# Patient Record
Sex: MALE | Race: WHITE | Employment: OTHER | URBAN - METROPOLITAN AREA
[De-identification: names, ages, dates, MRNs, and addresses within clinical notes are randomized per-mention and may not be internally consistent; named-entity substitution may affect disease eponyms.]

---

## 2018-04-26 LAB
CHOLESTEROL, TOTAL: 277 MG/DL
CHOLESTEROL/HDL RATIO: ABNORMAL
HDLC SERPL-MCNC: 85 MG/DL (ref 35–70)
LDL CHOLESTEROL CALCULATED: 173 MG/DL (ref 0–160)
TRIGL SERPL-MCNC: 96 MG/DL
VLDLC SERPL CALC-MCNC: 19 MG/DL

## 2018-10-30 ENCOUNTER — HOSPITAL ENCOUNTER (OUTPATIENT)
Age: 73
Discharge: HOME | End: 2018-10-30
Payer: COMMERCIAL

## 2018-10-30 ENCOUNTER — HOSPITAL ENCOUNTER (OUTPATIENT)
Dept: GENERAL RADIOLOGY | Age: 73
Discharge: HOME | End: 2018-10-30
Payer: COMMERCIAL

## 2018-10-30 DIAGNOSIS — M54.42 LOW BACK PAIN WITH LEFT-SIDED SCIATICA, UNSPECIFIED BACK PAIN LATERALITY, UNSPECIFIED CHRONICITY: ICD-10-CM

## 2018-10-30 PROCEDURE — 72100 X-RAY EXAM L-S SPINE 2/3 VWS: CPT

## 2018-12-27 ENCOUNTER — HOSPITAL ENCOUNTER (OUTPATIENT)
Dept: PHYSICAL THERAPY | Age: 73
Setting detail: THERAPIES SERIES
Discharge: HOME | End: 2018-12-27
Payer: COMMERCIAL

## 2018-12-27 PROCEDURE — 97162 PT EVAL MOD COMPLEX 30 MIN: CPT

## 2018-12-27 PROCEDURE — G8979 MOBILITY GOAL STATUS: HCPCS

## 2018-12-27 PROCEDURE — 97110 THERAPEUTIC EXERCISES: CPT

## 2018-12-27 PROCEDURE — 97140 MANUAL THERAPY 1/> REGIONS: CPT

## 2018-12-27 PROCEDURE — G8978 MOBILITY CURRENT STATUS: HCPCS

## 2018-12-27 ASSESSMENT — PAIN DESCRIPTION - ORIENTATION: ORIENTATION: LEFT

## 2018-12-27 ASSESSMENT — PAIN DESCRIPTION - LOCATION: LOCATION: HIP;LEG

## 2018-12-27 ASSESSMENT — PAIN SCALES - GENERAL: PAINLEVEL_OUTOF10: 4

## 2018-12-27 ASSESSMENT — PAIN DESCRIPTION - FREQUENCY: FREQUENCY: CONTINUOUS

## 2018-12-27 ASSESSMENT — PAIN DESCRIPTION - PROGRESSION: CLINICAL_PROGRESSION: NOT CHANGED

## 2018-12-27 ASSESSMENT — PAIN DESCRIPTION - DESCRIPTORS: DESCRIPTORS: SHARP

## 2018-12-27 ASSESSMENT — PAIN DESCRIPTION - ONSET: ONSET: SUDDEN

## 2018-12-27 ASSESSMENT — PAIN DESCRIPTION - DIRECTION: RADIATING_TOWARDS: DOWN TO KNEE

## 2018-12-27 ASSESSMENT — PAIN DESCRIPTION - PAIN TYPE: TYPE: CHRONIC PAIN

## 2018-12-27 NOTE — PLAN OF CARE
Outpatient Physical Therapy           [place]           [x] Phone: [phone]   Fax: [phone]  [place]           [] Phone: [phone]   Fax: [phone]     To: Referring Practitioner: Bhavana Perez    From: Gustavo Hector, KEY     Patient: Melissa Muller       : 1945  Diagnosis: Diagnosis: LBP w/ L LE pain   Treatment Diagnosis: Treatment Diagnosis: L hip and LE pain, ITB tightness   Date: 2018    Physical Therapy Certification/Re-Certification Form  Dear Dr. Barbara Simon,   The following patient has been evaluated for physical therapy services and for therapy to continue, insurance requires physician review of the treatment plan initially and every 90 days. Please review the attached evaluation and/or summary of the patient's plan of care, and verify that you agree therapy should continue by signing the attached document and sending it back to our office. Assessment:    Assessment: Pt is a 73 yo male who presents w/ Dx of LBP and L LE pain. He demonstrates symptoms consistent w/ L Hip issue including decreased ROM, TTP at trochanter, decreased strength and soft tissue tightness in anterior and lateral hip. His gait is quite antalgic. LB testing is negative for radicular type pain at this time. These limitations are affecting his normal activity and he will benefit from PT to help restore ROM and gait w/o pain. Prior to [address] [place] [address] in April, he had no pain in L hip/leg. Patient agrees with established plan of care and assisted in the development of their short term and long term goals. Patient had no adverse reaction with initial treatment and there are no barriers to learning. Demonstrates no mental or cognitive disorder.       Plan of Care/Treatment to date:  [x] Therapeutic Exercise  [x] Modalities:  [x] Therapeutic Activity     [] Ultrasound  [x] Electrical Stimulation  [] Gait Training      [] Cervical Traction [] Lumbar Traction  [x] Neuromuscular Re-education    [x] Cold/hotpack [] Iontophoresis   [x] Instruction in HEP      [] Vasopneumatic     [x] Manual Therapy               [] Aquatic Therapy       Other:    ? Frequency/Duration:2x/week to start, may decrease to 1x/week after first 2 weeks d/t copay  # Days per week: [x] 1 day # Weeks: [] 1 week [] 5 weeks     [x] 2 days?    [] 2 weeks [x] 6 weeks     [] 3 days   [] 3 weeks [] 7 weeks     [] 4 days   [] 4 weeks [] 8 weeks         [] 9 weeks [] 10 weeks         [] 11 weeks [] 12 weeks    Rehab Potential/Progress: [] Excellent [x] Good [] Fair  [] Poor     Goals:      Short term goals  Time Frame for Short term goals: 3 weeks, 1/18/19  Short term goal 1: Pt will be able to report at least 25% reduction in pain  Short term goal 2: Pt will be able to walk w/ less reported pain at least 5-10 mins at a time  Long term goals  Time Frame for Long term goals : 6 weeks, 2/8/19  Long term goal 1: Pt will be independent w/ HEP and able to continue to manage his pain on his own after PT  Long term goal 2: Pt will be able go up/down stairs normally w/o pain  Long term goal 3: Pt will be able to walk w/o limp or pain for > 10 mins  Long term goal 4: Pt will be able to report max pain w/ ADLs 5/10    G-Code Selection: (On Eval and every 10th visit or Discharge)  MEASURE  [x] Mobility: Walking and Moving Around     [x] Current ()   [x] Goal ()   [] DC ()  [] Changing/Maintaining Body Position     [] Current (8981)      [] Goal ()   [] DC ()  [] Carrying / Moving / Handling Objects     [] Current ()   [] Goal ()   [] DC ()  [] Self-Care     [] Current ()   [] Goal ()   [] DC ()  [] Other PT/OT primary DX     [] Current ()   [] Goal ()   [] DC ()    SEVERITY  CURRENT  GOAL  DISCHARGE   [] CH (0% Impaired, Indep.)  [] CI (1-19% Impaired, SBA-CGA)  [] CJ (20-39% Impaired, MIN A)  [x] CK  (40-59% Impairment, Mod A)  [] CL  (60-79% Impairment, Max A)  [] CM  (80-99% Impairment, Dep.)   []

## 2018-12-28 NOTE — PROGRESS NOTES
mins  Long term goal 4: Pt will be able to report max pain w/ ADLs 5/10  Patient Goals   Patient goals : reducing pain, walking w/o pain, stairs normally too and bending         Padmini Blake, PT  PT, MPT, ATC     12/27/2018, 7:01 PM

## 2019-01-03 ENCOUNTER — HOSPITAL ENCOUNTER (OUTPATIENT)
Dept: PHYSICAL THERAPY | Age: 74
Discharge: HOME | End: 2019-01-03

## 2019-01-03 NOTE — FLOWSHEET NOTE
Physical Therapy  Cancellation/No-show Note  Patient Name:  Cecille Stanley  :  1945   Date:  1/3/2019  Cancelled visits to date: 1  No-shows to date: 0    For today's appointment patient:  [x]  Cancelled  []  Rescheduled appointment  []  No-show     Reason given by patient:  []  Patient ill  []  Conflicting appointment  []  No transportation    []  Conflict with work  [x]  No reason given  []  Other:     Comments:      Electronically signed by:  Brianne Serrano PTA     1/3/2019,3:26 PM

## 2019-01-10 ENCOUNTER — HOSPITAL ENCOUNTER (OUTPATIENT)
Dept: PHYSICAL THERAPY | Age: 74
Setting detail: THERAPIES SERIES
Discharge: HOME | End: 2019-01-10
Payer: COMMERCIAL

## 2019-01-10 PROCEDURE — 97140 MANUAL THERAPY 1/> REGIONS: CPT

## 2019-01-10 PROCEDURE — 97530 THERAPEUTIC ACTIVITIES: CPT

## 2019-01-22 ENCOUNTER — HOSPITAL ENCOUNTER (OUTPATIENT)
Dept: PHYSICAL THERAPY | Age: 74
Setting detail: THERAPIES SERIES
Discharge: HOME | End: 2019-01-22
Payer: COMMERCIAL

## 2019-01-22 PROCEDURE — 97140 MANUAL THERAPY 1/> REGIONS: CPT

## 2019-01-22 PROCEDURE — 97110 THERAPEUTIC EXERCISES: CPT

## 2019-01-22 PROCEDURE — 97112 NEUROMUSCULAR REEDUCATION: CPT

## 2019-04-05 ENCOUNTER — HOSPITAL ENCOUNTER (OUTPATIENT)
Dept: PHYSICAL THERAPY | Age: 74
Discharge: HOME | End: 2019-04-05

## 2019-04-05 NOTE — PROGRESS NOTES
To: Referring Practitioner: Samir Murphy                                          From: Dharmesh Pedroza, PT             Patient: Estela Archer                                                      : 1945  Diagnosis: Diagnosis: LBP w/ L LE pain         Treatment Diagnosis: Treatment Diagnosis: L hip and LE pain, ITB tightness     2019       Dr Samir Murphy,     Pt only came 3x and no showed last 4 visits in a row. He had minimal progress but we are not able to fully assess d/t not returning. It has now been over 30 days so we will formally d/c and can re-evaluate at a later time prn.       Thank you,   Dharmesh Pedroza PT, MPT, ATC     2019, 9:15 AM

## 2019-05-26 ENCOUNTER — APPOINTMENT (OUTPATIENT)
Dept: ULTRASOUND IMAGING | Age: 74
DRG: 433 | End: 2019-05-26
Payer: MEDICARE

## 2019-05-26 ENCOUNTER — APPOINTMENT (OUTPATIENT)
Dept: CT IMAGING | Age: 74
DRG: 433 | End: 2019-05-26
Payer: MEDICARE

## 2019-05-26 ENCOUNTER — HOSPITAL ENCOUNTER (INPATIENT)
Age: 74
LOS: 2 days | Discharge: HOME | DRG: 433 | End: 2019-05-28
Attending: EMERGENCY MEDICINE | Admitting: INTERNAL MEDICINE
Payer: MEDICARE

## 2019-05-26 ENCOUNTER — APPOINTMENT (OUTPATIENT)
Dept: GENERAL RADIOLOGY | Age: 74
DRG: 433 | End: 2019-05-26
Payer: MEDICARE

## 2019-05-26 DIAGNOSIS — M79.89 LEG SWELLING: ICD-10-CM

## 2019-05-26 DIAGNOSIS — R06.00 DYSPNEA, UNSPECIFIED TYPE: Primary | ICD-10-CM

## 2019-05-26 DIAGNOSIS — R18.8 OTHER ASCITES: ICD-10-CM

## 2019-05-26 DIAGNOSIS — F10.20 ALCOHOLISM (HCC): ICD-10-CM

## 2019-05-26 PROBLEM — R06.02 SHORTNESS OF BREATH: Status: ACTIVE | Noted: 2019-05-26

## 2019-05-26 LAB
ALBUMIN SERPL-MCNC: 3.9 GM/DL (ref 3.4–5)
ALP BLD-CCNC: 154 IU/L (ref 40–128)
ALT SERPL-CCNC: 31 U/L (ref 10–40)
AMMONIA: 39 UMOL/L (ref 16–60)
ANION GAP SERPL CALCULATED.3IONS-SCNC: 13 MMOL/L (ref 4–16)
AST SERPL-CCNC: 59 IU/L (ref 15–37)
BASOPHILS ABSOLUTE: 0.1 K/CU MM
BASOPHILS RELATIVE PERCENT: 0.9 % (ref 0–1)
BILIRUB SERPL-MCNC: 0.7 MG/DL (ref 0–1)
BUN BLDV-MCNC: 13 MG/DL (ref 6–23)
CALCIUM SERPL-MCNC: 9.5 MG/DL (ref 8.3–10.6)
CHLORIDE BLD-SCNC: 101 MMOL/L (ref 99–110)
CO2: 25 MMOL/L (ref 21–32)
CREAT SERPL-MCNC: 0.8 MG/DL (ref 0.9–1.3)
DIFFERENTIAL TYPE: ABNORMAL
EOSINOPHILS ABSOLUTE: 0.5 K/CU MM
EOSINOPHILS RELATIVE PERCENT: 7.3 % (ref 0–3)
GFR AFRICAN AMERICAN: >60 ML/MIN/1.73M2
GFR NON-AFRICAN AMERICAN: >60 ML/MIN/1.73M2
GLUCOSE BLD-MCNC: 134 MG/DL (ref 70–99)
HCT VFR BLD CALC: 38.8 % (ref 42–52)
HEMOGLOBIN: 12.6 GM/DL (ref 13.5–18)
IMMATURE NEUTROPHIL %: 0.5 % (ref 0–0.43)
INR BLD: 1.09 INDEX
INR BLD: 1.11 INDEX
LYMPHOCYTES ABSOLUTE: 1.5 K/CU MM
LYMPHOCYTES RELATIVE PERCENT: 22.4 % (ref 24–44)
MCH RBC QN AUTO: 32.6 PG (ref 27–31)
MCHC RBC AUTO-ENTMCNC: 32.5 % (ref 32–36)
MCV RBC AUTO: 100.5 FL (ref 78–100)
MONOCYTES ABSOLUTE: 0.8 K/CU MM
MONOCYTES RELATIVE PERCENT: 11.4 % (ref 0–4)
NUCLEATED RBC %: 0 %
PDW BLD-RTO: 14.2 % (ref 11.7–14.9)
PLATELET # BLD: 150 K/CU MM (ref 140–440)
PMV BLD AUTO: 10.2 FL (ref 7.5–11.1)
POTASSIUM SERPL-SCNC: 3.7 MMOL/L (ref 3.5–5.1)
PRO-BNP: 186 PG/ML
PROTHROMBIN TIME: 12.4 SECONDS (ref 9.12–12.5)
PROTHROMBIN TIME: 12.6 SECONDS (ref 9.12–12.5)
RBC # BLD: 3.86 M/CU MM (ref 4.6–6.2)
SEGMENTED NEUTROPHILS ABSOLUTE COUNT: 3.8 K/CU MM
SEGMENTED NEUTROPHILS RELATIVE PERCENT: 57.5 % (ref 36–66)
SODIUM BLD-SCNC: 139 MMOL/L (ref 135–145)
TOTAL IMMATURE NEUTOROPHIL: 0.03 K/CU MM
TOTAL NUCLEATED RBC: 0 K/CU MM
TOTAL PROTEIN: 7.7 GM/DL (ref 6.4–8.2)
TROPONIN T: <0.01 NG/ML
WBC # BLD: 6.6 K/CU MM (ref 4–10.5)

## 2019-05-26 PROCEDURE — 94761 N-INVAS EAR/PLS OXIMETRY MLT: CPT

## 2019-05-26 PROCEDURE — 84484 ASSAY OF TROPONIN QUANT: CPT

## 2019-05-26 PROCEDURE — 2580000003 HC RX 258: Performed by: INTERNAL MEDICINE

## 2019-05-26 PROCEDURE — 6370000000 HC RX 637 (ALT 250 FOR IP): Performed by: INTERNAL MEDICINE

## 2019-05-26 PROCEDURE — 96374 THER/PROPH/DIAG INJ IV PUSH: CPT

## 2019-05-26 PROCEDURE — 6360000002 HC RX W HCPCS: Performed by: INTERNAL MEDICINE

## 2019-05-26 PROCEDURE — 2060000000 HC ICU INTERMEDIATE R&B

## 2019-05-26 PROCEDURE — 93010 ELECTROCARDIOGRAM REPORT: CPT | Performed by: INTERNAL MEDICINE

## 2019-05-26 PROCEDURE — 71275 CT ANGIOGRAPHY CHEST: CPT

## 2019-05-26 PROCEDURE — 93005 ELECTROCARDIOGRAM TRACING: CPT | Performed by: EMERGENCY MEDICINE

## 2019-05-26 PROCEDURE — 94640 AIRWAY INHALATION TREATMENT: CPT

## 2019-05-26 PROCEDURE — 71045 X-RAY EXAM CHEST 1 VIEW: CPT

## 2019-05-26 PROCEDURE — 85025 COMPLETE CBC W/AUTO DIFF WBC: CPT

## 2019-05-26 PROCEDURE — 6370000000 HC RX 637 (ALT 250 FOR IP): Performed by: PHYSICIAN ASSISTANT

## 2019-05-26 PROCEDURE — 93970 EXTREMITY STUDY: CPT

## 2019-05-26 PROCEDURE — 6360000002 HC RX W HCPCS: Performed by: PHYSICIAN ASSISTANT

## 2019-05-26 PROCEDURE — 6360000004 HC RX CONTRAST MEDICATION: Performed by: PHYSICIAN ASSISTANT

## 2019-05-26 PROCEDURE — 80053 COMPREHEN METABOLIC PANEL: CPT

## 2019-05-26 PROCEDURE — 36415 COLL VENOUS BLD VENIPUNCTURE: CPT

## 2019-05-26 PROCEDURE — 94664 DEMO&/EVAL PT USE INHALER: CPT

## 2019-05-26 PROCEDURE — 83880 ASSAY OF NATRIURETIC PEPTIDE: CPT

## 2019-05-26 PROCEDURE — 85610 PROTHROMBIN TIME: CPT

## 2019-05-26 PROCEDURE — 99285 EMERGENCY DEPT VISIT HI MDM: CPT

## 2019-05-26 PROCEDURE — 82140 ASSAY OF AMMONIA: CPT

## 2019-05-26 RX ORDER — LORAZEPAM 1 MG/1
4 TABLET ORAL
Status: DISCONTINUED | OUTPATIENT
Start: 2019-05-26 | End: 2019-05-28 | Stop reason: HOSPADM

## 2019-05-26 RX ORDER — IPRATROPIUM BROMIDE AND ALBUTEROL SULFATE 2.5; .5 MG/3ML; MG/3ML
1 SOLUTION RESPIRATORY (INHALATION) EVERY 4 HOURS PRN
Status: DISCONTINUED | OUTPATIENT
Start: 2019-05-26 | End: 2019-05-28 | Stop reason: HOSPADM

## 2019-05-26 RX ORDER — SPIRONOLACTONE 50 MG/1
50 TABLET, FILM COATED ORAL DAILY
Status: DISCONTINUED | OUTPATIENT
Start: 2019-05-26 | End: 2019-05-28 | Stop reason: HOSPADM

## 2019-05-26 RX ORDER — LORAZEPAM 1 MG/1
3 TABLET ORAL
Status: DISCONTINUED | OUTPATIENT
Start: 2019-05-26 | End: 2019-05-28 | Stop reason: HOSPADM

## 2019-05-26 RX ORDER — LORAZEPAM 2 MG/ML
3 INJECTION INTRAMUSCULAR
Status: DISCONTINUED | OUTPATIENT
Start: 2019-05-26 | End: 2019-05-28 | Stop reason: HOSPADM

## 2019-05-26 RX ORDER — ALPRAZOLAM 0.25 MG/1
0.25 TABLET ORAL 2 TIMES DAILY PRN
Status: DISCONTINUED | OUTPATIENT
Start: 2019-05-26 | End: 2019-05-28 | Stop reason: HOSPADM

## 2019-05-26 RX ORDER — METHYLPREDNISOLONE SODIUM SUCCINATE 125 MG/2ML
125 INJECTION, POWDER, LYOPHILIZED, FOR SOLUTION INTRAMUSCULAR; INTRAVENOUS ONCE
Status: COMPLETED | OUTPATIENT
Start: 2019-05-26 | End: 2019-05-26

## 2019-05-26 RX ORDER — LORAZEPAM 1 MG/1
2 TABLET ORAL
Status: DISCONTINUED | OUTPATIENT
Start: 2019-05-26 | End: 2019-05-28 | Stop reason: HOSPADM

## 2019-05-26 RX ORDER — ALPRAZOLAM 0.25 MG/1
0.25 TABLET ORAL DAILY PRN
COMMUNITY
End: 2020-06-22 | Stop reason: SDUPTHER

## 2019-05-26 RX ORDER — LORAZEPAM 2 MG/ML
1 INJECTION INTRAMUSCULAR
Status: DISCONTINUED | OUTPATIENT
Start: 2019-05-26 | End: 2019-05-28 | Stop reason: HOSPADM

## 2019-05-26 RX ORDER — LORAZEPAM 2 MG/ML
4 INJECTION INTRAMUSCULAR
Status: DISCONTINUED | OUTPATIENT
Start: 2019-05-26 | End: 2019-05-28 | Stop reason: HOSPADM

## 2019-05-26 RX ORDER — PANTOPRAZOLE SODIUM 40 MG/1
40 TABLET, DELAYED RELEASE ORAL
Status: DISCONTINUED | OUTPATIENT
Start: 2019-05-27 | End: 2019-05-28 | Stop reason: HOSPADM

## 2019-05-26 RX ORDER — ONDANSETRON 2 MG/ML
4 INJECTION INTRAMUSCULAR; INTRAVENOUS EVERY 6 HOURS PRN
Status: DISCONTINUED | OUTPATIENT
Start: 2019-05-26 | End: 2019-05-28 | Stop reason: HOSPADM

## 2019-05-26 RX ORDER — DULOXETIN HYDROCHLORIDE 30 MG/1
60 CAPSULE, DELAYED RELEASE ORAL DAILY
Status: DISCONTINUED | OUTPATIENT
Start: 2019-05-27 | End: 2019-05-28 | Stop reason: HOSPADM

## 2019-05-26 RX ORDER — SODIUM CHLORIDE 0.9 % (FLUSH) 0.9 %
10 SYRINGE (ML) INJECTION PRN
Status: DISCONTINUED | OUTPATIENT
Start: 2019-05-26 | End: 2019-05-28 | Stop reason: HOSPADM

## 2019-05-26 RX ORDER — METHYLPREDNISOLONE SODIUM SUCCINATE 40 MG/ML
40 INJECTION, POWDER, LYOPHILIZED, FOR SOLUTION INTRAMUSCULAR; INTRAVENOUS EVERY 6 HOURS
Status: DISCONTINUED | OUTPATIENT
Start: 2019-05-26 | End: 2019-05-28 | Stop reason: HOSPADM

## 2019-05-26 RX ORDER — NADOLOL 20 MG/1
40 TABLET ORAL DAILY
Status: DISCONTINUED | OUTPATIENT
Start: 2019-05-26 | End: 2019-05-28 | Stop reason: HOSPADM

## 2019-05-26 RX ORDER — LORAZEPAM 2 MG/ML
2 INJECTION INTRAMUSCULAR
Status: DISCONTINUED | OUTPATIENT
Start: 2019-05-26 | End: 2019-05-28 | Stop reason: HOSPADM

## 2019-05-26 RX ORDER — CHLORDIAZEPOXIDE HYDROCHLORIDE 5 MG/1
10 CAPSULE, GELATIN COATED ORAL 2 TIMES DAILY
Status: DISCONTINUED | OUTPATIENT
Start: 2019-05-26 | End: 2019-05-28 | Stop reason: HOSPADM

## 2019-05-26 RX ORDER — DULOXETIN HYDROCHLORIDE 60 MG/1
60 CAPSULE, DELAYED RELEASE ORAL DAILY
COMMUNITY
End: 2019-11-17 | Stop reason: SDUPTHER

## 2019-05-26 RX ORDER — IPRATROPIUM BROMIDE AND ALBUTEROL SULFATE 2.5; .5 MG/3ML; MG/3ML
1 SOLUTION RESPIRATORY (INHALATION) 4 TIMES DAILY
Status: DISCONTINUED | OUTPATIENT
Start: 2019-05-26 | End: 2019-05-28 | Stop reason: HOSPADM

## 2019-05-26 RX ORDER — FUROSEMIDE 10 MG/ML
40 INJECTION INTRAMUSCULAR; INTRAVENOUS 2 TIMES DAILY
Status: DISCONTINUED | OUTPATIENT
Start: 2019-05-27 | End: 2019-05-28 | Stop reason: HOSPADM

## 2019-05-26 RX ORDER — AMLODIPINE BESYLATE 5 MG/1
5 TABLET ORAL DAILY
Status: ON HOLD | COMMUNITY
End: 2019-05-28 | Stop reason: HOSPADM

## 2019-05-26 RX ORDER — PROCHLORPERAZINE EDISYLATE 5 MG/ML
10 INJECTION INTRAMUSCULAR; INTRAVENOUS EVERY 6 HOURS PRN
Status: DISCONTINUED | OUTPATIENT
Start: 2019-05-26 | End: 2019-05-28 | Stop reason: HOSPADM

## 2019-05-26 RX ORDER — IPRATROPIUM BROMIDE AND ALBUTEROL SULFATE 2.5; .5 MG/3ML; MG/3ML
1 SOLUTION RESPIRATORY (INHALATION) ONCE
Status: COMPLETED | OUTPATIENT
Start: 2019-05-26 | End: 2019-05-26

## 2019-05-26 RX ORDER — LORAZEPAM 1 MG/1
1 TABLET ORAL
Status: DISCONTINUED | OUTPATIENT
Start: 2019-05-26 | End: 2019-05-28 | Stop reason: HOSPADM

## 2019-05-26 RX ADMIN — METHYLPREDNISOLONE SODIUM SUCCINATE 125 MG: 125 INJECTION, POWDER, LYOPHILIZED, FOR SOLUTION INTRAMUSCULAR; INTRAVENOUS at 15:48

## 2019-05-26 RX ADMIN — CHLORDIAZEPOXIDE HYDROCHLORIDE 10 MG: 5 CAPSULE ORAL at 22:48

## 2019-05-26 RX ADMIN — IOPAMIDOL 75 ML: 755 INJECTION, SOLUTION INTRAVENOUS at 17:45

## 2019-05-26 RX ADMIN — SPIRONOLACTONE 50 MG: 50 TABLET ORAL at 22:47

## 2019-05-26 RX ADMIN — GUAIFENESIN AND DEXTROMETHORPHAN HYDROBROMIDE 1 TABLET: 600; 30 TABLET, EXTENDED RELEASE ORAL at 22:48

## 2019-05-26 RX ADMIN — IPRATROPIUM BROMIDE AND ALBUTEROL SULFATE 1 AMPULE: .5; 3 SOLUTION RESPIRATORY (INHALATION) at 15:10

## 2019-05-26 RX ADMIN — METHYLPREDNISOLONE SODIUM SUCCINATE 40 MG: 40 INJECTION, POWDER, LYOPHILIZED, FOR SOLUTION INTRAMUSCULAR; INTRAVENOUS at 22:44

## 2019-05-26 RX ADMIN — IPRATROPIUM BROMIDE AND ALBUTEROL SULFATE 1 AMPULE: .5; 3 SOLUTION RESPIRATORY (INHALATION) at 22:50

## 2019-05-26 RX ADMIN — AZITHROMYCIN MONOHYDRATE 500 MG: 500 INJECTION, POWDER, LYOPHILIZED, FOR SOLUTION INTRAVENOUS at 22:47

## 2019-05-26 RX ADMIN — ALPRAZOLAM 0.25 MG: 0.25 TABLET ORAL at 22:48

## 2019-05-26 RX ADMIN — NADOLOL 40 MG: 20 TABLET ORAL at 22:48

## 2019-05-26 ASSESSMENT — PAIN SCALES - GENERAL: PAINLEVEL_OUTOF10: 0

## 2019-05-26 NOTE — H&P
HISTORY AND PHYSICAL  (Hospitalist, Internal Medicine)  IDENTIFYING INFORMATION   PATIENT:  Leah Chaparro  MRN:  [id number]  ADMIT DATE: 5/26/2019  TIME OF EVALUATION: 5/26/2019 7:12 PM    CHIEF COMPLAINT   SOB    HISTORY OF PRESENT ILLNESS   Leah Chaparro is a 72 y.o. male with a past medical history 1/3 liter of whiskey consumption daily, HTN, hx of smoking but has now quit smoking many years previously presents with 3-4 weeks hx of progressive SOB, worse on exertion. Associated with several months of worsening b/l LE swelling, edema that has not improved with time. In additional, today, his symptoms acutely worsened as he felt he had the cold with increase mucus production. These symptoms have not been improving leading to hospital admission for evaluation of multiple issues.       PMH listed below:    PAST MEDICAL, SURGICAL, FAMILY, and SOCIAL HISTORY     Past Medical History:   Diagnosis Date    Hypertension      Past Surgical History:   Procedure Laterality Date    CHOLECYSTECTOMY       Family Hx of HTN  Family Hx as reviewed above, otherwise non-contributory  Social History     Socioeconomic History    Marital status:      Spouse name: None    Number of children: None    Years of education: None    Highest education level: None   Occupational History    None   Social Needs    Financial resource strain: None    Food insecurity:     Worry: None     Inability: None    Transportation needs:     Medical: None     Non-medical: None   Tobacco Use    Smoking status: Never Smoker    Smokeless tobacco: Never Used   Substance and Sexual Activity    Alcohol use: Yes     Comment: drink 1/3 liter of whiskey everyday    Drug use: Never    Sexual activity: None   Lifestyle    Physical activity:     Days per week: None     Minutes per session: None    Stress: None   Relationships    Social connections:     Talks on phone: None     Gets together: None     Attends Sikh service: None     Active member of club or organization: None     Attends meetings of clubs or organizations: None     Relationship status: None    Intimate partner violence:     Fear of current or ex partner: None     Emotionally abused: None     Physically abused: None     Forced sexual activity: None   Other Topics Concern    None   Social History Narrative    None       MEDICATIONS   Medications Prior to Admission  Not in a hospital admission. Current Medications  Current Facility-Administered Medications   Medication Dose Route Frequency Provider Last Rate Last Dose    sodium chloride flush 0.9 % injection 10 mL  10 mL Intravenous PRN KATIE Vargas         No current outpatient medications on file. Allergies  Allergies   Allergen Reactions    Codeine Other (See Comments)     Blood pressure drops       REVIEW OF SYSTEMS   Within above limitations. 14 point review of systems reviewed. Pertinent positive or negative as per HPI or otherwise negative per 14 point systems review. PHYSICAL EXAM     Wt Readings from Last 3 Encounters:   05/26/19 280 lb (127 kg)       Blood pressure (!) 154/75, pulse 99, resp. rate 23, height 5' 8\" (1.727 m), weight 280 lb (127 kg), SpO2 94 %. General - AAO x 3  Psych - Appropriate affect/speech. No agitation  Eyes - Eye lids intact. No scleral icterus  ENT - Lips wnl. External ear clear/dry/intact. No thyromegaly on inspection  Neuro - No gross peripheral or central neuro deficits on inspection  Heart - Sinus. RRR. S1 and S2 present. No added HS/murmurs appreciated. No elevated JVD appreciated  Lung - Adequate air entry b/l, No crackles/wheezes appreciated  GI - Soft. No guarding/rigidity. No hepatosplenomegaly/ascites. BS+   - No CVA/suprapubic tenderness or palpable bladder distension  Skin - Intact. No rash/petechiae/ecchymosis. Warm extremities  MSK - Joints with normal ROM.  No joint swellings    Lines/Drains/Airways/Wounds:  [unfilled]    LABS AND IMAGING CBC  [unfilled]    Last 3 Hemoglobin  Lab Results   Component Value Date    HGB 12.6 05/26/2019     Last 3 WBC/ANC  Lab Results   Component Value Date    WBC 6.6 05/26/2019     No components found for: GRNLOCTYABS  Last 3 Platelets  No results found for: PLATELET  Chemistry  [unfilled]  [unfilled]  No results found for: LDH  Coagulation Studies  Lab Results   Component Value Date    INR 1.09 05/26/2019     Liver Function Studies  Lab Results   Component Value Date    ALT 31 05/26/2019    AST 59 05/26/2019    ALKPHOS 154 05/26/2019       Recent Imaging    Dave Sullivan #[id number] ([id number]) (72 y.o. M) (Adm: 05/26/19)    [facility] [id number]         Imaging Results (last 7 days)          Procedure Component Value Ref Range Date/Time     VL DUP LOWER EXTREMITY VENOUS BILATERAL [[id number]] Collected: 05/26/19 1819     Order Status: Completed Updated: 05/26/19 1823     Narrative:       EXAMINATION:  DUPLEX VENOUS ULTRASOUND OF THE BILATERAL LOWER EXTREMITIES, 5/26/2019 5:42 pm    TECHNIQUE:  Duplex ultrasound and Doppler images were obtained of the bilateral lower  extremities    COMPARISON:  None. HISTORY:  ORDERING SYSTEM PROVIDED HISTORY: leg swelling  TECHNOLOGIST PROVIDED HISTORY:  Reason for exam:->leg swelling  Acuity: Acute  Type of Exam: Initial    FINDINGS:  The visualized veins of the bilateral lower extremities are patent and free  of echogenic thrombus. The veins are normally compressible and have normal  phasic flow.     Impression:       No evidence of DVT in either lower extremity.     CTA PULMONARY W CONTRAST [[id number]] Collected: 05/26/19 1748     Order Status: Completed Updated: 05/26/19 1800     Narrative:       EXAMINATION:  CTA OF THE CHEST 5/26/2019 5:44 pm    TECHNIQUE:  CTA of the chest was performed after the administration of intravenous  contrast.  Multiplanar reformatted images are provided for review.  MIP  images are provided for review.  Dose modulation, iterative from 2015.     Impression:       1. No evidence pulmonary embolism. 2. Small right pleural effusion with associated atelectasis.  Mild left  basilar atelectasis. 3. Cirrhotic hepatic morphology with a moderate to large volume of  intra-ascites. 4. Nonspecific mediastinal lymphadenopathy and enlarged paraesophageal nodes. Prominent axillary nodes also noted bilaterally.  Similar lymphadenopathy  noted at the distal esophagus and partially imaged mediastinum on prior CT  abdomen and pelvis from 2015.  Correlate for any history of known malignancy.     XR CHEST PORTABLE [[id number]] Collected: 05/26/19 1503     Order Status: Completed Updated: 05/26/19 0968     Narrative:       EXAMINATION:  ONE XRAY VIEW OF THE CHEST    5/26/2019 2:31 pm    COMPARISON:  July 7, 2014    HISTORY:  ORDERING SYSTEM PROVIDED HISTORY: SOB  TECHNOLOGIST PROVIDED HISTORY:  Reason for exam:->SOB  Ordering Physician Provided Reason for Exam: SOB  Acuity: Acute  Type of Exam: Initial  Additional signs and symptoms: Leg Swelling (bilateral leg and feet swelling  for 1 month)  Relevant Medical/Surgical History: htn    FINDINGS:  The cardiomediastinal silhouette is moderately enlarged. Mild vascular congestion is present without evidence of focal consolidation  or pulmonary edema. No pleural effusion or pneumothorax.     Impression:       Mild cardiomegaly and vascular congestive changes.         EKG personally reviewed with rate 92, NSR      Relevant labs and imaging reviewed    ASSESSMENT AND PLAN     SOB suspect 2/2   1) Acute bronchitis, hx of smoking  2) Abdominal ascites , abdo distension 2/2 acute decompensated liver cirrhosis  3) CXR mild vascular changes  - consult GI to assist with new cirrhosis dx  - check TTE  - check CT A/P  - check HCV/HBV serologies  - start nadolol as preferred BP med due to concomitant cirrhosis. Also start aldactone.  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

## 2019-05-26 NOTE — ED PROVIDER NOTES
limb.  Distal cap refill and pulses intact bilateral upper and lower extremities  Bilateral upper and lower extremity ROM intact without pain or obvious deficit  Integument:  Warm, Dry  Neurologic: Alert & oriented , No focal deficits noted. Cranial nerves II through XII grossly intact. Normal gross motor coordination & motor strength bilateral upper and lower extremities  Sensation intact.   Psychiatric:  Affect normal, Mood normal.       Labs:  Results for orders placed or performed during the hospital encounter of 05/26/19   CBC Auto Differential   Result Value Ref Range    WBC 6.6 4.0 - 10.5 K/CU MM    RBC 3.86 (L) 4.6 - 6.2 M/CU MM    Hemoglobin 12.6 (L) 13.5 - 18.0 GM/DL    Hematocrit 38.8 (L) 42 - 52 %    .5 (H) 78 - 100 FL    MCH 32.6 (H) 27 - 31 PG    MCHC 32.5 32.0 - 36.0 %    RDW 14.2 11.7 - 14.9 %    Platelets 768 082 - 272 K/CU MM    MPV 10.2 7.5 - 11.1 FL    Differential Type AUTOMATED DIFFERENTIAL     Segs Relative 57.5 36 - 66 %    Lymphocytes % 22.4 (L) 24 - 44 %    Monocytes % 11.4 (H) 0 - 4 %    Eosinophils % 7.3 (H) 0 - 3 %    Basophils % 0.9 0 - 1 %    Segs Absolute 3.8 K/CU MM    Lymphocytes # 1.5 K/CU MM    Monocytes # 0.8 K/CU MM    Eosinophils # 0.5 K/CU MM    Basophils # 0.1 K/CU MM    Nucleated RBC % 0.0 %    Total Nucleated RBC 0.0 K/CU MM    Total Immature Neutrophil 0.03 K/CU MM    Immature Neutrophil % 0.5 (H) 0 - 0.43 %   CMP   Result Value Ref Range    Sodium 139 135 - 145 MMOL/L    Potassium 3.7 3.5 - 5.1 MMOL/L    Chloride 101 99 - 110 mMol/L    CO2 25 21 - 32 MMOL/L    BUN 13 6 - 23 MG/DL    CREATININE 0.8 (L) 0.9 - 1.3 MG/DL    Glucose 134 (H) 70 - 99 MG/DL    Calcium 9.5 8.3 - 10.6 MG/DL    Alb 3.9 3.4 - 5.0 GM/DL    Total Protein 7.7 6.4 - 8.2 GM/DL    Total Bilirubin 0.7 0.0 - 1.0 MG/DL    ALT 31 10 - 40 U/L    AST 59 (H) 15 - 37 IU/L    Alkaline Phosphatase 154 (H) 40 - 128 IU/L    GFR Non-African American >60 >60 mL/min/1.73m2    GFR African American >60 >60 mL/min/1.73m2    Anion Gap 13 4 - 16   Brain Natriuretic Peptide   Result Value Ref Range    Pro-.0 <300 PG/ML   PT - INR   Result Value Ref Range    Protime 12.4 9.12 - 12.5 SECONDS    INR 1.09 INDEX   Troponin   Result Value Ref Range    Troponin T <0.010 <0.01 NG/ML   EKG 12 Lead   Result Value Ref Range    Ventricular Rate 92 BPM    Atrial Rate 92 BPM    P-R Interval 170 ms    QRS Duration 74 ms    Q-T Interval 364 ms    QTc Calculation (Bazett) 450 ms    P Axis 33 degrees    R Axis 31 degrees    T Axis 46 degrees    Diagnosis       Normal sinus rhythm  Low voltage QRS  Borderline ECG  No previous ECGs available  Confirmed by TANIA Jeffers ([id number]) on 5/26/2019 4:22:07 PM               EKG Interpretation  Please see ED physician's note for EKG interpretation       RADIOLOGY    Xr Chest Portable    Result Date: 5/26/2019  EXAMINATION: ONE XRAY VIEW OF THE CHEST 5/26/2019 2:31 pm COMPARISON: July 7, 2014 HISTORY: ORDERING SYSTEM PROVIDED HISTORY: SOB TECHNOLOGIST PROVIDED HISTORY: Reason for exam:->SOB Ordering Physician Provided Reason for Exam: SOB Acuity: Acute Type of Exam: Initial Additional signs and symptoms: Leg Swelling (bilateral leg and feet swelling for 1 month) Relevant Medical/Surgical History: htn FINDINGS: The cardiomediastinal silhouette is moderately enlarged. Mild vascular congestion is present without evidence of focal consolidation or pulmonary edema. No pleural effusion or pneumothorax. Mild cardiomegaly and vascular congestive changes. Vl Dup Lower Extremity Venous Bilateral    Result Date: 5/26/2019  EXAMINATION: DUPLEX VENOUS ULTRASOUND OF THE BILATERAL LOWER EXTREMITIES, 5/26/2019 5:42 pm TECHNIQUE: Duplex ultrasound and Doppler images were obtained of the bilateral lower extremities COMPARISON: None.  HISTORY: ORDERING SYSTEM PROVIDED HISTORY: leg swelling TECHNOLOGIST PROVIDED HISTORY: Reason for exam:->leg swelling Acuity: Acute Type of Exam: Initial FINDINGS: The which is simple in appearance. Several prominent nodes noted at the esophageal hiatus. Soft Tissues/Bones: No acute osseous abnormality identified. Multiple prominent bilateral axillary nodes. Well-circumscribed lesion closely associated with the skin surface along the right chest wall measuring 2 cm most compatible with a epidermal inclusion cyst given the close association with the skin surface and well-defined appearance. This was noted on prior CT abdomen pelvis from 2015. 1. No evidence pulmonary embolism. 2. Small right pleural effusion with associated atelectasis. Mild left basilar atelectasis. 3. Cirrhotic hepatic morphology with a moderate to large volume of intra-ascites. 4. Nonspecific mediastinal lymphadenopathy and enlarged paraesophageal nodes. Prominent axillary nodes also noted bilaterally. Similar lymphadenopathy noted at the distal esophagus and partially imaged mediastinum on prior CT abdomen and pelvis from 2015. Correlate for any history of known malignancy. ED COURSE & MEDICAL DECISION MAKING       Evaluation of dyspnea reveals evidence of liver cirrhosis and ascites. Patient admits to drinking alcohol routinely. Given this, will add INR and ammonia level and plan for admission the hospital for further evaluation, possible fluid removal.    1850-I discussed patient case with hospitalist, Dr. Farhan Maier. He agrees to admit patient. Vital signs and nursing notes reviewed during ED course. Patient care and presentation staffed with supervising MD.   Patient seen by supervising MD today. Clinical  IMPRESSION    1. Dyspnea, unspecified type    2. Leg swelling    3. Other ascites          Comment: Please note this report has been produced using speech recognition software and may contain errors related to that system including errors in grammar, punctuation, and spelling, as well as words and phrases that may be inappropriate.  If there are any questions or concerns please feel free to contact the dictating provider for clarification.          Theodore Faria  05/26/19 5991

## 2019-05-26 NOTE — ED PROVIDER NOTES
I independently examined and evaluated Roseanne Ash. In brief, 61-year-old presenting with bilateral leg swelling and shortness of breath. Reporting orthopnea with his symptoms. Vitals:    19 1813   BP: (!) 154/75   Pulse: 99   Resp: 23   SpO2: 94%     Focused exam revealed     General appearance:  No acute distress. Extremity:  bilateral leg swelling. range of motion intact  Heart:  Regular rate and rhythm, normal S1 & S2, no extra heart sounds. Perfusion:  intact  Respiratory:   bilateral basilar crackles. Respirations nonlabored. Abdominal:  Normal bowel sounds. Soft. Nontender. distended.         EK lead EKG per my interpretation:  Normal Sinus Rhythm 92  Axis is   Normal  QTc is  within an acceptable range  There is no specific T wave changes appreciated. There is no specific ST wave changes appreciated. Prior EKG to compare with was not available       ED course: Will evaluate source CHF exacerbation versus COPD versus pneumonia. Evaluated for DVT noted to be unremarkable. CTA of the chest to evaluate for PE was negative however noted to be remarkable for cirrhosis and large volume ascites. Ammonia ordered and INR within normal limits. Plan for patient to be admitted for further medical management. During his time in the ED he was stable and in no acute distress. All diagnostic, treatment, and disposition decisions were made by myself in conjunction with the advanced practice provider. For all further details of the patient's emergency department visit, please see the advanced practice provider's documentation. Comment: Please note this report has been produced using speech recognition software and may contain errors related to that system including errors in grammar, punctuation, and spelling, as well as words and phrases that may be inappropriate. If there are any questions or concerns please feel free to contact the dictating provider for clarification. Keri Treadwell MD  05/27/19 9300

## 2019-05-27 ENCOUNTER — APPOINTMENT (OUTPATIENT)
Dept: CT IMAGING | Age: 74
DRG: 433 | End: 2019-05-27
Payer: MEDICARE

## 2019-05-27 LAB
ADENOVIRUS DETECTION BY PCR: NOT DETECTED
ALBUMIN SERPL-MCNC: 3.9 GM/DL (ref 3.4–5)
ALP BLD-CCNC: 158 IU/L (ref 40–129)
ALT SERPL-CCNC: 32 U/L (ref 10–40)
AMMONIA: 107 UMOL/L (ref 16–60)
ANION GAP SERPL CALCULATED.3IONS-SCNC: 13 MMOL/L (ref 4–16)
APTT: 31.6 SECONDS (ref 21.2–33)
AST SERPL-CCNC: 54 IU/L (ref 15–37)
BASOPHILS ABSOLUTE: 0 K/CU MM
BASOPHILS RELATIVE PERCENT: 0.1 % (ref 0–1)
BILIRUB SERPL-MCNC: 0.7 MG/DL (ref 0–1)
BILIRUBIN DIRECT: 0.2 MG/DL (ref 0–0.3)
BILIRUBIN, INDIRECT: 0.5 MG/DL (ref 0–0.7)
BORDETELLA PERTUSSIS PCR: NOT DETECTED
BUN BLDV-MCNC: 12 MG/DL (ref 6–23)
CALCIUM SERPL-MCNC: 9.8 MG/DL (ref 8.3–10.6)
CHLAMYDOPHILA PNEUMONIA PCR: NOT DETECTED
CHLORIDE BLD-SCNC: 104 MMOL/L (ref 99–110)
CO2: 26 MMOL/L (ref 21–32)
CORONAVIRUS 229E PCR: NOT DETECTED
CORONAVIRUS HKU1 PCR: NOT DETECTED
CORONAVIRUS NL63 PCR: NOT DETECTED
CORONAVIRUS OC43 PCR: NOT DETECTED
CREAT SERPL-MCNC: 0.8 MG/DL (ref 0.9–1.3)
DIFFERENTIAL TYPE: ABNORMAL
EOSINOPHILS ABSOLUTE: 0 K/CU MM
EOSINOPHILS RELATIVE PERCENT: 0.1 % (ref 0–3)
FERRITIN: 73 NG/ML (ref 30–400)
GFR AFRICAN AMERICAN: >60 ML/MIN/1.73M2
GFR NON-AFRICAN AMERICAN: >60 ML/MIN/1.73M2
GLUCOSE BLD-MCNC: 144 MG/DL (ref 70–99)
HAV IGM SER IA-ACNC: NON REACTIVE
HBV SURFACE AB TITR SER: <3.5 {TITER}
HCT VFR BLD CALC: 40.9 % (ref 42–52)
HEMOGLOBIN: 13.1 GM/DL (ref 13.5–18)
HEPATITIS B SURFACE ANTIGEN: NON REACTIVE
HEPATITIS C ANTIBODY: NON REACTIVE
HUMAN METAPNEUMOVIRUS PCR: NOT DETECTED
IMMATURE NEUTROPHIL %: 0.3 % (ref 0–0.43)
INFLUENZA A BY PCR: NOT DETECTED
INFLUENZA A H1 (2009) PCR: NOT DETECTED
INFLUENZA A H1 PANDEMIC PCR: NOT DETECTED
INFLUENZA A H3 PCR: NOT DETECTED
INFLUENZA B BY PCR: NOT DETECTED
INR BLD: 1.12 INDEX
IRON: 73 UG/DL (ref 59–158)
LYMPHOCYTES ABSOLUTE: 0.7 K/CU MM
LYMPHOCYTES RELATIVE PERCENT: 8.1 % (ref 24–44)
MAGNESIUM: 1.7 MG/DL (ref 1.8–2.4)
MCH RBC QN AUTO: 32.2 PG (ref 27–31)
MCHC RBC AUTO-ENTMCNC: 32 % (ref 32–36)
MCV RBC AUTO: 100.5 FL (ref 78–100)
MONOCYTES ABSOLUTE: 0.2 K/CU MM
MONOCYTES RELATIVE PERCENT: 2 % (ref 0–4)
MYCOPLASMA PNEUMONIAE PCR: NOT DETECTED
NUCLEATED RBC %: 0 %
PARAINFLUENZA 1 PCR: NOT DETECTED
PARAINFLUENZA 2 PCR: NOT DETECTED
PARAINFLUENZA 3 PCR: NOT DETECTED
PARAINFLUENZA 4 PCR: NOT DETECTED
PCT TRANSFERRIN: 22 % (ref 10–44)
PDW BLD-RTO: 13.7 % (ref 11.7–14.9)
PLATELET # BLD: 156 K/CU MM (ref 140–440)
PMV BLD AUTO: 10.6 FL (ref 7.5–11.1)
POTASSIUM SERPL-SCNC: 4.2 MMOL/L (ref 3.5–5.1)
PROTHROMBIN TIME: 12.7 SECONDS (ref 9.12–12.5)
RBC # BLD: 4.07 M/CU MM (ref 4.6–6.2)
RHINOVIRUS ENTEROVIRUS PCR: ABNORMAL
RSV PCR: NOT DETECTED
SEGMENTED NEUTROPHILS ABSOLUTE COUNT: 7.8 K/CU MM
SEGMENTED NEUTROPHILS RELATIVE PERCENT: 89.4 % (ref 36–66)
SODIUM BLD-SCNC: 143 MMOL/L (ref 135–145)
TOTAL IMMATURE NEUTOROPHIL: 0.03 K/CU MM
TOTAL IRON BINDING CAPACITY: 336 UG/DL (ref 250–450)
TOTAL NUCLEATED RBC: 0 K/CU MM
TOTAL PROTEIN: 7.6 GM/DL (ref 6.4–8.2)
TRANSFERRIN: 267.1 MG/DL (ref 200–360)
UNSATURATED IRON BINDING CAPACITY: 263 UG/DL (ref 110–370)
WBC # BLD: 8.7 K/CU MM (ref 4–10.5)

## 2019-05-27 PROCEDURE — 83516 IMMUNOASSAY NONANTIBODY: CPT

## 2019-05-27 PROCEDURE — 2700000000 HC OXYGEN THERAPY PER DAY

## 2019-05-27 PROCEDURE — 85025 COMPLETE CBC W/AUTO DIFF WBC: CPT

## 2019-05-27 PROCEDURE — 85730 THROMBOPLASTIN TIME PARTIAL: CPT

## 2019-05-27 PROCEDURE — 2060000000 HC ICU INTERMEDIATE R&B

## 2019-05-27 PROCEDURE — 6360000002 HC RX W HCPCS: Performed by: INTERNAL MEDICINE

## 2019-05-27 PROCEDURE — 86706 HEP B SURFACE ANTIBODY: CPT

## 2019-05-27 PROCEDURE — 87521 HEPATITIS C PROBE&RVRS TRNSC: CPT

## 2019-05-27 PROCEDURE — 86039 ANTINUCLEAR ANTIBODIES (ANA): CPT

## 2019-05-27 PROCEDURE — 6370000000 HC RX 637 (ALT 250 FOR IP): Performed by: INTERNAL MEDICINE

## 2019-05-27 PROCEDURE — 82390 ASSAY OF CERULOPLASMIN: CPT

## 2019-05-27 PROCEDURE — 82103 ALPHA-1-ANTITRYPSIN TOTAL: CPT

## 2019-05-27 PROCEDURE — 94761 N-INVAS EAR/PLS OXIMETRY MLT: CPT

## 2019-05-27 PROCEDURE — 86803 HEPATITIS C AB TEST: CPT

## 2019-05-27 PROCEDURE — 86256 FLUORESCENT ANTIBODY TITER: CPT

## 2019-05-27 PROCEDURE — 87581 M.PNEUMON DNA AMP PROBE: CPT

## 2019-05-27 PROCEDURE — 83735 ASSAY OF MAGNESIUM: CPT

## 2019-05-27 PROCEDURE — 87633 RESP VIRUS 12-25 TARGETS: CPT

## 2019-05-27 PROCEDURE — 86038 ANTINUCLEAR ANTIBODIES: CPT

## 2019-05-27 PROCEDURE — 87486 CHLMYD PNEUM DNA AMP PROBE: CPT

## 2019-05-27 PROCEDURE — 36415 COLL VENOUS BLD VENIPUNCTURE: CPT

## 2019-05-27 PROCEDURE — 84466 ASSAY OF TRANSFERRIN: CPT

## 2019-05-27 PROCEDURE — 74176 CT ABD & PELVIS W/O CONTRAST: CPT

## 2019-05-27 PROCEDURE — 87340 HEPATITIS B SURFACE AG IA: CPT

## 2019-05-27 PROCEDURE — 83540 ASSAY OF IRON: CPT

## 2019-05-27 PROCEDURE — 85610 PROTHROMBIN TIME: CPT

## 2019-05-27 PROCEDURE — 82248 BILIRUBIN DIRECT: CPT

## 2019-05-27 PROCEDURE — 94640 AIRWAY INHALATION TREATMENT: CPT

## 2019-05-27 PROCEDURE — 82140 ASSAY OF AMMONIA: CPT

## 2019-05-27 PROCEDURE — 2580000003 HC RX 258: Performed by: INTERNAL MEDICINE

## 2019-05-27 PROCEDURE — 87798 DETECT AGENT NOS DNA AMP: CPT

## 2019-05-27 PROCEDURE — 80053 COMPREHEN METABOLIC PANEL: CPT

## 2019-05-27 PROCEDURE — 82728 ASSAY OF FERRITIN: CPT

## 2019-05-27 PROCEDURE — 82105 ALPHA-FETOPROTEIN SERUM: CPT

## 2019-05-27 PROCEDURE — 86704 HEP B CORE ANTIBODY TOTAL: CPT

## 2019-05-27 PROCEDURE — 86709 HEPATITIS A IGM ANTIBODY: CPT

## 2019-05-27 RX ADMIN — SODIUM CHLORIDE, PRESERVATIVE FREE 10 ML: 5 INJECTION INTRAVENOUS at 10:35

## 2019-05-27 RX ADMIN — GUAIFENESIN AND DEXTROMETHORPHAN HYDROBROMIDE 1 TABLET: 600; 30 TABLET, EXTENDED RELEASE ORAL at 10:34

## 2019-05-27 RX ADMIN — GUAIFENESIN AND DEXTROMETHORPHAN HYDROBROMIDE 1 TABLET: 600; 30 TABLET, EXTENDED RELEASE ORAL at 21:00

## 2019-05-27 RX ADMIN — DULOXETINE HYDROCHLORIDE 60 MG: 30 CAPSULE, DELAYED RELEASE ORAL at 10:34

## 2019-05-27 RX ADMIN — PANTOPRAZOLE SODIUM 40 MG: 40 TABLET, DELAYED RELEASE ORAL at 06:19

## 2019-05-27 RX ADMIN — METHYLPREDNISOLONE SODIUM SUCCINATE 40 MG: 40 INJECTION, POWDER, LYOPHILIZED, FOR SOLUTION INTRAMUSCULAR; INTRAVENOUS at 10:35

## 2019-05-27 RX ADMIN — SPIRONOLACTONE 50 MG: 50 TABLET ORAL at 10:34

## 2019-05-27 RX ADMIN — CHLORDIAZEPOXIDE HYDROCHLORIDE 10 MG: 5 CAPSULE ORAL at 21:00

## 2019-05-27 RX ADMIN — IPRATROPIUM BROMIDE AND ALBUTEROL SULFATE 1 AMPULE: .5; 3 SOLUTION RESPIRATORY (INHALATION) at 07:30

## 2019-05-27 RX ADMIN — METHYLPREDNISOLONE SODIUM SUCCINATE 40 MG: 40 INJECTION, POWDER, LYOPHILIZED, FOR SOLUTION INTRAMUSCULAR; INTRAVENOUS at 17:19

## 2019-05-27 RX ADMIN — METHYLPREDNISOLONE SODIUM SUCCINATE 40 MG: 40 INJECTION, POWDER, LYOPHILIZED, FOR SOLUTION INTRAMUSCULAR; INTRAVENOUS at 21:01

## 2019-05-27 RX ADMIN — IPRATROPIUM BROMIDE AND ALBUTEROL SULFATE 1 AMPULE: .5; 3 SOLUTION RESPIRATORY (INHALATION) at 11:17

## 2019-05-27 RX ADMIN — METHYLPREDNISOLONE SODIUM SUCCINATE 40 MG: 40 INJECTION, POWDER, LYOPHILIZED, FOR SOLUTION INTRAMUSCULAR; INTRAVENOUS at 04:29

## 2019-05-27 RX ADMIN — AZITHROMYCIN MONOHYDRATE 500 MG: 500 INJECTION, POWDER, LYOPHILIZED, FOR SOLUTION INTRAVENOUS at 23:12

## 2019-05-27 RX ADMIN — NADOLOL 40 MG: 20 TABLET ORAL at 10:34

## 2019-05-27 RX ADMIN — FUROSEMIDE 40 MG: 10 INJECTION, SOLUTION INTRAMUSCULAR; INTRAVENOUS at 17:19

## 2019-05-27 RX ADMIN — FUROSEMIDE 40 MG: 10 INJECTION, SOLUTION INTRAMUSCULAR; INTRAVENOUS at 10:35

## 2019-05-27 RX ADMIN — IPRATROPIUM BROMIDE AND ALBUTEROL SULFATE 1 AMPULE: .5; 3 SOLUTION RESPIRATORY (INHALATION) at 15:20

## 2019-05-27 RX ADMIN — IPRATROPIUM BROMIDE AND ALBUTEROL SULFATE 1 AMPULE: .5; 3 SOLUTION RESPIRATORY (INHALATION) at 21:49

## 2019-05-27 RX ADMIN — CHLORDIAZEPOXIDE HYDROCHLORIDE 10 MG: 5 CAPSULE ORAL at 10:34

## 2019-05-27 RX ADMIN — ENOXAPARIN SODIUM 40 MG: 40 INJECTION SUBCUTANEOUS at 10:35

## 2019-05-27 ASSESSMENT — PAIN SCALES - GENERAL
PAINLEVEL_OUTOF10: 0

## 2019-05-27 NOTE — PROGRESS NOTES
Progress Note (Hospitalist, Internal Medicine)  IDENTIFYING INFORMATION   PATIENT:  Raheel Acevedo  MRN:  [id number]  ADMIT DATE: 5/26/2019  TIME OF EVALUATION: 5/27/2019 1:32 PM      HISTORY OF PRESENT ILLNESS   Raheel Acevedo is a 72 y.o. male with a past medical history 1/3 liter of whiskey consumption daily, HTN, hx of smoking but has now quit smoking many years previously presents with 3-4 weeks hx of progressive SOB, worse on exertion. Associated with several months of worsening b/l LE swelling, edema that has not improved with time. In additional, today, his symptoms acutely worsened as he felt he had the cold with increase mucus production. These symptoms have not been improving leading to hospital admission for evaluation of multiple issues. SUBJECTIVE     Breathing better  Abdo swelling and LE edema still present    MEDICATIONS   Medications Prior to Admission  Medications Prior to Admission: DULoxetine (CYMBALTA) 60 MG extended release capsule, Take 60 mg by mouth daily  BISOPROLOL-HYDROCHLOROTHIAZIDE PO, Take 10 mg by mouth daily Indications: High Blood Pressure Disorder, 6.25 HCTZ  amLODIPine (NORVASC) 5 MG tablet, Take 5 mg by mouth daily  ALPRAZolam (XANAX) 0.25 MG tablet, Take 0.25 mg by mouth daily as needed for Anxiety.     Current Medications  Current Facility-Administered Medications   Medication Dose Route Frequency Provider Last Rate Last Dose    sodium chloride flush 0.9 % injection 10 mL  10 mL Intravenous PRN Norbert Fountain MD   10 mL at 05/27/19 1035    DULoxetine (CYMBALTA) extended release capsule 60 mg  60 mg Oral Daily Norbert Fountain MD   60 mg at 05/27/19 1034    ALPRAZolam (XANAX) tablet 0.25 mg  0.25 mg Oral BID PRN Norbert Fountain MD   0.25 mg at 05/26/19 2248    pantoprazole (PROTONIX) tablet 40 mg  40 mg Oral QAM AC Norbert Fountain MD   40 mg at 05/27/19 6145    nadolol (CORGARD) tablet 40 mg  40 mg Oral Daily Norbert Fountain MD   40 mg at 05/27/19 1034    spironolactone Allergies  Allergies   Allergen Reactions    Codeine Other (See Comments)     Blood pressure drops       REVIEW OF SYSTEMS     Within above limitations. 14 point review of systems reviewed. Pertinent positive or negative as per HPI or otherwise negative per 14 point systems review. Reviewed 5/27/2019 at 1:32 PM    PHYSICAL EXAM       Blood pressure (!) 162/68, pulse 78, temperature 97.6 °F (36.4 °C), temperature source Oral, resp. rate 15, height 5' 8\" (1.727 m), weight 280 lb (127 kg), SpO2 97 %. General - AAO x 3  Psych - Appropriate affect/speech. No agitation  Eyes - Eye lids intact. No scleral icterus  ENT - Lips wnl. External ear clear/dry/intact. No thyromegaly on inspection  Neuro - No gross peripheral or central neuro deficits on inspection  Heart - Sinus. RRR. S1 and S2 present. No elevated JVD appreciated  Lung - Adequate air entry b/l, No crackles,improving wheezes appreciated  GI - Distension. No guarding/rigidity. Large ascites. BS+   - No CVA/suprapubic tenderness or palpable bladder distension  Skin - +2 b/l LE edema. No rash/petechiae/ecchymosis.  Warm extremities      LABS AND IMAGING   CBC  [unfilled]    Last 3 Hemoglobin  Lab Results   Component Value Date    HGB 13.1 05/27/2019    HGB 12.6 05/26/2019     Last 3 WBC/ANC  Lab Results   Component Value Date    WBC 8.7 05/27/2019    WBC 6.6 05/26/2019     No components found for: GRNLOCTYABS  Last 3 Platelets  No results found for: PLATELET  Chemistry  [unfilled]  [unfilled]  No results found for: LDH  Coagulation Studies  Lab Results   Component Value Date    INR 1.12 05/27/2019     Liver Function Studies  Lab Results   Component Value Date    ALT 32 05/27/2019    AST 54 05/27/2019    ALKPHOS 158 05/27/2019       Recent Imaging    VL DUP LOWER EXTREMITY VENOUS BILATERAL [[id number]] Collected: 05/26/19 1819      Order Status: Completed Updated: 05/26/19 1823     Narrative:       EXAMINATION:  DUPLEX VENOUS ULTRASOUND OF THE abnormality. Cheryl Dine is no acute abnormality of the  thoracic aorta. Lungs/pleura: Lungs demonstrate a small right pleural effusion associated  right basilar atelectasis.  Mild left basilar atelectasis.  Lungs otherwise  appear clear with no consolidation identified.  No evidence for pneumothorax. Upper Abdomen: Limited images of the upper abdomen demonstrate nodular  contour of the liver consistent with cirrhotic morphology. Cheryl Dine is a  moderate to large volume of intra-abdominal ascites which is simple in  appearance.  Several prominent nodes noted at the esophageal hiatus. Soft Tissues/Bones: No acute osseous abnormality identified.  Multiple  prominent bilateral axillary nodes.  Well-circumscribed lesion closely  associated with the skin surface along the right chest wall measuring 2 cm  most compatible with a epidermal inclusion cyst given the close association  with the skin surface and well-defined appearance.  This was noted on prior  CT abdomen pelvis from 2015.     Impression:       1. No evidence pulmonary embolism. 2. Small right pleural effusion with associated atelectasis.  Mild left  basilar atelectasis. 3. Cirrhotic hepatic morphology with a moderate to large volume of  intra-ascites. 4. Nonspecific mediastinal lymphadenopathy and enlarged paraesophageal nodes.   Prominent axillary nodes also noted bilaterally.  Similar lymphadenopathy  noted at the distal esophagus and partially imaged mediastinum on prior CT  abdomen and pelvis from 2015.  Correlate for any history of known malignancy.     XR CHEST PORTABLE [[id number]] Collected: 05/26/19 1503     Order Status: Completed Updated: 05/26/19 1738     Narrative:       EXAMINATION:  ONE XRAY VIEW OF THE CHEST    5/26/2019 2:31 pm    COMPARISON:  July 7, 2014    HISTORY:  ORDERING SYSTEM PROVIDED HISTORY: SOB  TECHNOLOGIST PROVIDED HISTORY:  Reason for exam:->SOB  Ordering Physician Provided Reason for Exam: SOB  Acuity: Acute  Type of Exam: Initial  Additional signs and symptoms: Leg Swelling (bilateral leg and feet swelling  for 1 month)  Relevant Medical/Surgical History: htn    FINDINGS:  The cardiomediastinal silhouette is moderately enlarged. Mild vascular congestion is present without evidence of focal consolidation  or pulmonary edema. No pleural effusion or pneumothorax.     Impression:       Mild cardiomegaly and vascular congestive changes. Relevant labs and imaging reviewed    ASSESSMENT AND PLAN       SOB suspect 2/2   1) Acute bronchitis, hx of smoking, acute rhinovirus URI  2) Abdominal ascites , abdo distension 2/2 acute decompensated liver cirrhosis  3) CXR mild vascular changes  - consult GI to assist with new cirrhosis dx  - check TTE  - check CT A/P  - check HCV/HBV serologies negative  - start nadolol as preferred BP med due to concomitant cirrhosis. Also start aldactone.  IR consult for paracentesis Tuesday when service resumes  - duonebs, empiric azithro, IV steroids - weaning today  - check RVP with rhinovirus URI  - tele, pulse ox    LE edema  - likely relate to cirrhosis, liver disease  - trial IV lasix  - I&Os    New diagnosis of cirrhosis likely 2/2 alcohol  - consult GI    Alcohol use with high risk of acute withdrawal with cessation  Discussed inpatient detox, weaning  - [facility] protocol  - schedule librium    HTN  Depression    Lovenox ppx        [address] [facility], Internal Medicine  5/27/2019 at 1:32 PM

## 2019-05-27 NOTE — CONSULTS
[address], [address] [facility]                                  CONSULTATION    PATIENT NAME: Katheran Goltz                   :        1945  MED REC NO:   [id number]                          ROOM:       1906  ACCOUNT NO:   [de-identified]                           ADMIT DATE: 2019  PROVIDER:     Shantanu Rodas MD    CONSULT DATE:  2019    PRIMARY PHYSICIAN:  Tabitha Fuentes MD    The patient is in room [phone]. CHIEF COMPLAINT:  Abdominal distention with abnormal CAT scan, rule out  new onset chronic liver disease, etiology to be determined. HISTORY OF PRESENT ILLNESS:  As follows: The patient is a 74-year-old  white gentleman with past medical history significant for hypertension,  gastroesophageal reflux disease, EtOH abuse, last drink a couple of days  prior to coming to the hospital, who presented to the emergency room on  2019 with about four-week history of progressive abdominal  distention, lower extremity swelling and intractable cough productive of  whitish sputum. The patient denies abdominal pain, vomiting, hematemesis, melena or  hematochezia. The patient had a blood workup done upon admission, which  comprised a chem profile, which was unremarkable. LFTs showed an  alkaline phosphatase of 154, AST was 59 and ALT of 31. CBC showed a WBC  count of 6.6, hemoglobin 12.6 and platelet count was 110,852. The  patient's INR is 1.12. CAT scan of the chest was done, which showed  moderate to large volume ascites along with cirrhotic hepatic  morphology. Also, lymphadenopathy was noted and the patient had a CT of  the abdomen and pelvis done today and the report is pending. Large  volume paracentesis is in order for tomorrow a.m.     The patient does give long-standing history of EtOH abuse and last drink  was a couple of days prior to coming to the hospital.  The patient did  have an EGD done by me more than 10 years ago and had a colonoscopy done  by Dr. Thuy Cruz at [facility] approximately six months  ago and had colon polyps removed. The patient is hemodynamically  stable. REVIEW OF SYSTEMS:  CENTRAL NERVOUS SYSTEM:  The patient denies headache or focal sensory  motor symptoms. CARDIOVASCULAR SYSTEM:  The patient complains of shortness of breath and  leg swelling. GENITOURINARY SYSTEM:  No history of dysuria, pyuria or hematuria. MUSCULOSKELETAL SYSTEM:  No history of aches and pains in muscles and  joints. RESPIRATORY SYSTEM:  The patient complains of cough productive of  whitish phlegm, but no hemoptysis, fever or chills. PAST MEDICAL HISTORY:  Significant for history of hypertension,  gastroesophageal reflux disease and EtOH abuse. FAMILY HISTORY:  The patient's mother was diagnosed with ovarian  carcinoma and brother with carcinoma of the esophagus. MEDICATIONS:  Please refer to the chart. SOCIOECONOMIC HISTORY:  He has long-standing history of EtOH abuse, last  drink was a couple of days prior to coming to the hospital.  The patient  does not smoke cigarettes. There is no history of recreational drug  use. SURGERIES:  The patient has had cholecystectomy done in the remote past.    ALLERGIES:  The patient is allergic to CODEINE. PHYSICAL EXAMINATION:  GENERAL:  Shows a 68-year-old white gentleman who is obese, lying flat  in bed, in no acute distress. He is awake, alert and oriented and  pleasant to talk with. VITAL SIGNS:  Stable. HEENT:  Shows skull to be atraumatic. NECK:  Supple. CHEST:  Clear. HEART:  S1, S2 is normal.  ABDOMEN:  Distended, tense secondary to ascites. No guarding or  rigidity. Liver and spleen are not palpable. Bowel sounds are present. RECTAL:  Deferred. CENTRAL NERVOUS SYSTEM:  Shows the patient to be awake, alert and  oriented. There are no focal sensory motor signs.   MUSCULOSKELETAL SYSTEM:  Remarkable for 2 to 3+ edema of the lower  extremities. LABORATORY DATA:  As above mentioned. IMPRESSION:  A 46-year-old white gentleman presents with four-week  history of abdominal distention, lower extremity swelling and upon CAT  scan is noted to have new onset of possible chronic liver disease with  cirrhotic morphology of the liver along with ascites and etiology, rule  out alcohol liver disease (?) with portal hypertension. RECOMMENDATIONS:  1. Agree with present management. 2.  The patient is scheduled for large volume paracentesis in a.m. and  we will follow up on the ascitic fluid results, especially cytology to  rule out underlying malignancy. 3.  We will also follow up on hepatitis A, B and C serology. 4.  We will check iron studies. 5.  Check antinuclear antibody, antimitochondrial antibody and  antismooth muscle antibody. 6.  We will also check alpha-fetoprotein level and blood ammonia level. 7.  The patient will need a 2 gm salt diet and diuretics as a part of  management of his ascites. 8.  Monitor the patient for acute decompensation of his liver disease. 9.  If hepatitis A and B serology is negative, the patient will need  vaccination for hepatitis A and B as well. 10. The patient will need a screening EGD also at a later date to rule  out esophageal/gastric varices. 11. The patient has been strongly instructed to quit drinking alcohol. 12.  The case and plan has been discussed in detail with the patient and  his wife.         Deepa Zaman MD    D: 05/27/2019 12:56:19       T: 05/27/2019 14:11:35     [email or web address]  Job#: [id number]     Doc#: [id number]    CC:  Adalberto Anand MD

## 2019-05-28 ENCOUNTER — APPOINTMENT (OUTPATIENT)
Dept: INTERVENTIONAL RADIOLOGY/VASCULAR | Age: 74
DRG: 433 | End: 2019-05-28
Payer: MEDICARE

## 2019-05-28 VITALS
RESPIRATION RATE: 16 BRPM | BODY MASS INDEX: 42.33 KG/M2 | HEART RATE: 80 BPM | DIASTOLIC BLOOD PRESSURE: 67 MMHG | OXYGEN SATURATION: 96 % | HEIGHT: 68 IN | TEMPERATURE: 97.9 F | WEIGHT: 279.3 LBS | SYSTOLIC BLOOD PRESSURE: 155 MMHG

## 2019-05-28 LAB
ALBUMIN SERPL-MCNC: 3.7 GM/DL (ref 3.4–5)
ALP BLD-CCNC: 137 IU/L (ref 40–129)
ALT SERPL-CCNC: 29 U/L (ref 10–40)
ANION GAP SERPL CALCULATED.3IONS-SCNC: 11 MMOL/L (ref 4–16)
APTT: 30.9 SECONDS (ref 21.2–33)
AST SERPL-CCNC: 44 IU/L (ref 15–37)
BASOPHILS ABSOLUTE: 0 K/CU MM
BASOPHILS RELATIVE PERCENT: 0.1 % (ref 0–1)
BILIRUB SERPL-MCNC: 0.6 MG/DL (ref 0–1)
BILIRUBIN DIRECT: 0.2 MG/DL (ref 0–0.3)
BILIRUBIN, INDIRECT: 0.4 MG/DL (ref 0–0.7)
BUN BLDV-MCNC: 20 MG/DL (ref 6–23)
CALCIUM SERPL-MCNC: 9.4 MG/DL (ref 8.3–10.6)
CHLORIDE BLD-SCNC: 102 MMOL/L (ref 99–110)
CO2: 28 MMOL/L (ref 21–32)
CREAT SERPL-MCNC: 1.1 MG/DL (ref 0.9–1.3)
DIFFERENTIAL TYPE: ABNORMAL
EOSINOPHILS ABSOLUTE: 0 K/CU MM
EOSINOPHILS RELATIVE PERCENT: 0 % (ref 0–3)
GFR AFRICAN AMERICAN: >60 ML/MIN/1.73M2
GFR NON-AFRICAN AMERICAN: >60 ML/MIN/1.73M2
GLUCOSE BLD-MCNC: 157 MG/DL (ref 70–99)
HCT VFR BLD CALC: 37.2 % (ref 42–52)
HEMOGLOBIN: 12 GM/DL (ref 13.5–18)
HEPATITIS C ANTIBODY: NON REACTIVE
IMMATURE NEUTROPHIL %: 1.3 % (ref 0–0.43)
INR BLD: 1.16 INDEX
LV EF: 60 %
LVEF MODALITY: NORMAL
LYMPHOCYTES ABSOLUTE: 0.9 K/CU MM
LYMPHOCYTES RELATIVE PERCENT: 4.7 % (ref 24–44)
MAGNESIUM: 1.8 MG/DL (ref 1.8–2.4)
MCH RBC QN AUTO: 32.7 PG (ref 27–31)
MCHC RBC AUTO-ENTMCNC: 32.3 % (ref 32–36)
MCV RBC AUTO: 101.4 FL (ref 78–100)
MONOCYTES ABSOLUTE: 0.5 K/CU MM
MONOCYTES RELATIVE PERCENT: 2.9 % (ref 0–4)
PDW BLD-RTO: 14.1 % (ref 11.7–14.9)
PLATELET # BLD: 149 K/CU MM (ref 140–440)
PMV BLD AUTO: 10.7 FL (ref 7.5–11.1)
POTASSIUM SERPL-SCNC: 3.9 MMOL/L (ref 3.5–5.1)
PROTHROMBIN TIME: 13.4 SECONDS (ref 9.12–12.5)
RBC # BLD: 3.67 M/CU MM (ref 4.6–6.2)
SEGMENTED NEUTROPHILS ABSOLUTE COUNT: 17.1 K/CU MM
SEGMENTED NEUTROPHILS RELATIVE PERCENT: 91 % (ref 36–66)
SODIUM BLD-SCNC: 141 MMOL/L (ref 135–145)
TOTAL IMMATURE NEUTOROPHIL: 0.24 K/CU MM
TOTAL PROTEIN: 7 GM/DL (ref 6.4–8.2)
WBC # BLD: 18.7 K/CU MM (ref 4–10.5)

## 2019-05-28 PROCEDURE — 85610 PROTHROMBIN TIME: CPT

## 2019-05-28 PROCEDURE — 85025 COMPLETE CBC W/AUTO DIFF WBC: CPT

## 2019-05-28 PROCEDURE — 0W9G3ZZ DRAINAGE OF PERITONEAL CAVITY, PERCUTANEOUS APPROACH: ICD-10-PCS | Performed by: RADIOLOGY

## 2019-05-28 PROCEDURE — 82248 BILIRUBIN DIRECT: CPT

## 2019-05-28 PROCEDURE — 6370000000 HC RX 637 (ALT 250 FOR IP): Performed by: INTERNAL MEDICINE

## 2019-05-28 PROCEDURE — 93306 TTE W/DOPPLER COMPLETE: CPT

## 2019-05-28 PROCEDURE — 80053 COMPREHEN METABOLIC PANEL: CPT

## 2019-05-28 PROCEDURE — 83735 ASSAY OF MAGNESIUM: CPT

## 2019-05-28 PROCEDURE — 36415 COLL VENOUS BLD VENIPUNCTURE: CPT

## 2019-05-28 PROCEDURE — C1729 CATH, DRAINAGE: HCPCS

## 2019-05-28 PROCEDURE — 85730 THROMBOPLASTIN TIME PARTIAL: CPT

## 2019-05-28 PROCEDURE — 86803 HEPATITIS C AB TEST: CPT

## 2019-05-28 PROCEDURE — 76705 ECHO EXAM OF ABDOMEN: CPT

## 2019-05-28 PROCEDURE — 2709999900 HC NON-CHARGEABLE SUPPLY

## 2019-05-28 PROCEDURE — 6360000002 HC RX W HCPCS: Performed by: INTERNAL MEDICINE

## 2019-05-28 RX ORDER — NADOLOL 40 MG/1
40 TABLET ORAL DAILY
Qty: 90 TABLET | Refills: 3 | Status: SHIPPED | OUTPATIENT
Start: 2019-05-29 | End: 2019-12-04 | Stop reason: SDUPTHER

## 2019-05-28 RX ORDER — LACTULOSE 10 G/15ML
10 SOLUTION ORAL 2 TIMES DAILY PRN
Qty: 473 ML | Refills: 1 | Status: SHIPPED | OUTPATIENT
Start: 2019-05-28 | End: 2019-08-08

## 2019-05-28 RX ORDER — PREDNISONE 20 MG/1
20 TABLET ORAL DAILY
Qty: 10 TABLET | Refills: 0 | Status: SHIPPED | OUTPATIENT
Start: 2019-05-28 | End: 2019-06-07

## 2019-05-28 RX ORDER — FUROSEMIDE 40 MG/1
40 TABLET ORAL DAILY
Qty: 30 TABLET | Refills: 1 | Status: SHIPPED | OUTPATIENT
Start: 2019-05-28 | End: 2019-06-10 | Stop reason: SDUPTHER

## 2019-05-28 RX ORDER — CHLORDIAZEPOXIDE HYDROCHLORIDE 10 MG/1
CAPSULE, GELATIN COATED ORAL
Qty: 7 CAPSULE | Refills: 0 | Status: SHIPPED | OUTPATIENT
Start: 2019-05-28 | End: 2019-06-08

## 2019-05-28 RX ORDER — ADHESIVE TAPE 3"X 2.3 YD
1 TAPE, NON-MEDICATED TOPICAL 2 TIMES DAILY
Qty: 270 TABLET | Refills: 1 | Status: SHIPPED | OUTPATIENT
Start: 2019-05-28 | End: 2019-12-04

## 2019-05-28 RX ORDER — SPIRONOLACTONE 50 MG/1
50 TABLET, FILM COATED ORAL DAILY
Qty: 90 TABLET | Refills: 3 | Status: SHIPPED | OUTPATIENT
Start: 2019-05-29 | End: 2019-12-04 | Stop reason: SDUPTHER

## 2019-05-28 RX ORDER — LACTULOSE 10 G/15ML
20 SOLUTION ORAL 3 TIMES DAILY
Status: DISCONTINUED | OUTPATIENT
Start: 2019-05-28 | End: 2019-05-28 | Stop reason: HOSPADM

## 2019-05-28 RX ADMIN — ENOXAPARIN SODIUM 40 MG: 40 INJECTION SUBCUTANEOUS at 09:31

## 2019-05-28 RX ADMIN — GUAIFENESIN AND DEXTROMETHORPHAN HYDROBROMIDE 1 TABLET: 600; 30 TABLET, EXTENDED RELEASE ORAL at 09:30

## 2019-05-28 RX ADMIN — DULOXETINE HYDROCHLORIDE 60 MG: 30 CAPSULE, DELAYED RELEASE ORAL at 09:30

## 2019-05-28 RX ADMIN — METHYLPREDNISOLONE SODIUM SUCCINATE 40 MG: 40 INJECTION, POWDER, LYOPHILIZED, FOR SOLUTION INTRAMUSCULAR; INTRAVENOUS at 05:34

## 2019-05-28 RX ADMIN — SPIRONOLACTONE 50 MG: 50 TABLET ORAL at 09:31

## 2019-05-28 RX ADMIN — CHLORDIAZEPOXIDE HYDROCHLORIDE 10 MG: 5 CAPSULE ORAL at 09:31

## 2019-05-28 RX ADMIN — METHYLPREDNISOLONE SODIUM SUCCINATE 40 MG: 40 INJECTION, POWDER, LYOPHILIZED, FOR SOLUTION INTRAMUSCULAR; INTRAVENOUS at 09:40

## 2019-05-28 RX ADMIN — PANTOPRAZOLE SODIUM 40 MG: 40 TABLET, DELAYED RELEASE ORAL at 05:34

## 2019-05-28 RX ADMIN — FUROSEMIDE 40 MG: 10 INJECTION, SOLUTION INTRAMUSCULAR; INTRAVENOUS at 09:33

## 2019-05-28 RX ADMIN — NADOLOL 40 MG: 20 TABLET ORAL at 09:30

## 2019-05-28 ASSESSMENT — PAIN SCALES - GENERAL: PAINLEVEL_OUTOF10: 0

## 2019-05-28 NOTE — DISCHARGE SUMMARY
SpO2 96 %. General - AAO x 3  Psych - Appropriate affect/speech. No agitation  Eyes - Eye lids intact. No scleral icterus  ENT - Lips wnl. External ear clear/dry/intact. No thyromegaly on inspection  Neuro - No gross peripheral or central neuro deficits on inspection  Heart - Sinus. RRR. S1 and S2 present. No elevated JVD appreciated  Lung - Adequate air entry b/l, No crackles/wheezes appreciated  GI -  Soft. No guarding/rigidity. BS+    Skin - +1 b/l LE edema      Significant Diagnostic Studies:   CBC:   Recent Labs     05/26/19  1436 05/27/19 0424 05/28/19 0229   WBC 6.6 8.7 18.7*   HGB 12.6* 13.1* 12.0*    156 149     WBC   Date/Time Value Ref Range Status   05/28/2019 02:29 AM 18.7 (H) 4.0 - 10.5 K/CU MM Final   05/27/2019 04:24 AM 8.7 4.0 - 10.5 K/CU MM Final   05/26/2019 02:36 PM 6.6 4.0 - 10.5 K/CU MM Final     Hemoglobin   Date/Time Value Ref Range Status   05/28/2019 02:29 AM 12.0 (L) 13.5 - 18.0 GM/DL Final   05/27/2019 04:24 AM 13.1 (L) 13.5 - 18.0 GM/DL Final   05/26/2019 02:36 PM 12.6 (L) 13.5 - 18.0 GM/DL Final     Platelets   Date/Time Value Ref Range Status   05/28/2019 02:29  140 - 440 K/CU MM Final   05/27/2019 04:24  140 - 440 K/CU MM Final   05/26/2019 02:36  140 - 440 K/CU MM Final    CMP:  Recent Labs     05/26/19  1436 05/27/19 0424 05/28/19 0229    143 141   K 3.7 4.2 3.9    104 102   CO2 25 26 28   BUN 13 12 20   CREATININE 0.8* 0.8* 1.1   CALCIUM 9.5 9.8 9.4   PROT 7.7 7.6 7.0   LABALBU 3.9 3.9 3.7   BILITOT 0.7 0.7 0.6   ALKPHOS 154* 158* 137*   AST 59* 54* 44*   ALT 31 32 29     Troponin: No results for input(s): TROPONINI in the last 72 hours. BNP: No results for input(s): BNP in the last 72 hours. Lipids: No results for input(s): CHOL, HDL in the last 72 hours. Invalid input(s): LDLCALCU  ABGs:No results for input(s): PH, RUE9RQZ, PO2ART, BE, UVN8TOV, CO2CT, O2SAT, LABCARB in the last 72 hours. Invalid input(s):   METHGBART    Glucose: No results for input(s): POCGLU in the last 72 hours. Magnesium:   Recent Labs     05/27/19  0424 05/28/19  0229   MG 1.7* 1.8     Phosphorus:No results for input(s): PHOS in the last 72 hours. INR:   Recent Labs     05/26/19 2057 05/27/19 0424 05/28/19 0229   INR 1.11 1.12 1.16         Patient Instructions:   Paulina Bal   Home Medication Instructions QPY:[id number]    Printed on:05/28/19 2701   Medication Information                      ALPRAZolam (XANAX) 0.25 MG tablet  Take 0.25 mg by mouth daily as needed for Anxiety.              chlordiazePOXIDE (LIBRIUM) 10 MG capsule  Taper - 10mg QD for 3 days, 10mg every other day for 8 days and stop             DULoxetine (CYMBALTA) 60 MG extended release capsule  Take 60 mg by mouth daily             furosemide (LASIX) 40 MG tablet  Take 1 tablet by mouth daily             lactulose (CHRONULAC) 10 GM/15ML solution  Take 15 mLs by mouth 2 times daily as needed (titrate to 1 BM daily) This can help with liver disease associated confusion if it occurs             Magnesium Oxide 200 MG TABS  Take 1 tablet by mouth 2 times daily for 270 doses             nadolol (CORGARD) 40 MG tablet  Take 1 tablet by mouth daily             predniSONE (DELTASONE) 20 MG tablet  Take 1 tablet by mouth daily for 10 days Prednisone taper - 40mg QD for 2 days, 20mg QD for 4 days, 20 mg every other day for 4 days             spironolactone (ALDACTONE) 50 MG tablet  Take 1 tablet by mouth daily                  Code Status: Full Code     Consults:   IP CONSULT TO HOSPITALIST  IP CONSULT TO GI  IP CONSULT TO INTERVENTIONAL RADIOLOGY    Diet: cardiac diet    Activity: activity as tolerated   Work:    Discharged Condition: fair    Prognosis: Fair    Disposition: home      Follow-up with     Follow-up With  Details  Why  Contact Info   Marie Chung MD  Schedule an appointment as soon as possible for a visit in 1 week  f/u with PCP for hepatitis A, B vaccination, alcohol

## 2019-05-28 NOTE — PROGRESS NOTES
Attempted to make follow up appt for patient with Dr Jairon Bains office but there was no answer. Will instruct patient to call office to make follow up appt.  Francia LAGUNAS

## 2019-05-28 NOTE — PLAN OF CARE
Ivania Shah RN  Outcome: Ongoing  5/28/2019 0311 by Lupe Stovall  Outcome: Ongoing  Goal: Respiratory status will improve  Description  Respiratory status will improve  5/28/2019 1314 by Ivania Shah RN  Outcome: Completed  5/28/2019 0928 by Ivania Shah RN  Outcome: Ongoing  5/28/2019 0311 by Lupe Stovall  Outcome: Ongoing     Problem: Skin Integrity:  Goal: Demonstration of wound healing without infection will improve  Description  Demonstration of wound healing without infection will improve  5/28/2019 1314 by Ivania Shah RN  Outcome: Completed  5/28/2019 0928 by Ivania hSah RN  Outcome: Ongoing  5/28/2019 0311 by Lupe Stovall  Outcome: Ongoing  Goal: Complications related to intravenous access or infusion will be avoided or minimized  Description  Complications related to intravenous access or infusion will be avoided or minimized  5/28/2019 1314 by Ivania Shah RN  Outcome: Completed  5/28/2019 0928 by Ivania Shah RN  Outcome: Ongoing  5/28/2019 0311 by Lupe Stovall  Outcome: Ongoing     Problem: Fluid Volume:  Goal: Hemodynamic stability will improve  Description  Hemodynamic stability will improve  5/28/2019 1314 by Ivania Shah RN  Outcome: Completed  5/28/2019 0928 by Ivania Shah RN  Outcome: Ongoing  5/28/2019 0311 by Lupe Stovall  Outcome: Ongoing  Goal: Ability to maintain a balanced intake and output will improve  Description  Ability to maintain a balanced intake and output will improve  5/28/2019 1314 by Ivania Shah RN  Outcome: Completed  5/28/2019 0928 by Ivania Shah RN  Outcome: Ongoing  5/28/2019 0311 by Lupe Stovall  Outcome: Ongoing     Problem: Health Behavior:  Goal: Identification of resources available to assist in meeting health care needs will improve  Description  Identification of resources available to assist in meeting health care needs will improve  5/28/2019 1314 by Ivania Shah RN  Outcome: Completed  5/28/2019 0928 by Martha Yoon RN  Outcome: Ongoing  5/28/2019 0311 by Yunier Bowman  Outcome: Ongoing

## 2019-05-28 NOTE — PLAN OF CARE
Problem: Falls - Risk of:  Goal: Will remain free from falls  Description  Will remain free from falls  Outcome: Ongoing  Goal: Absence of physical injury  Description  Absence of physical injury  Outcome: Ongoing     Problem: Nutritional:  Goal: Nutritional status will improve  Description  Nutritional status will improve  Outcome: Ongoing     Problem: Physical Regulation:  Goal: Diagnostic test results will improve  Description  Diagnostic test results will improve  Outcome: Ongoing  Goal: Will remain free from infection  Description  Will remain free from infection  Outcome: Ongoing  Goal: Ability to maintain vital signs within normal range will improve  Description  Ability to maintain vital signs within normal range will improve  Outcome: Ongoing     Problem: Respiratory:  Goal: Ability to maintain normal respiratory secretions will improve  Description  Ability to maintain normal respiratory secretions will improve  Outcome: Ongoing  Goal: Respiratory status will improve  Description  Respiratory status will improve  Outcome: Ongoing     Problem: Skin Integrity:  Goal: Demonstration of wound healing without infection will improve  Description  Demonstration of wound healing without infection will improve  Outcome: Ongoing  Goal: Complications related to intravenous access or infusion will be avoided or minimized  Description  Complications related to intravenous access or infusion will be avoided or minimized  Outcome: Ongoing     Problem: Fluid Volume:  Goal: Hemodynamic stability will improve  Description  Hemodynamic stability will improve  Outcome: Ongoing  Goal: Ability to maintain a balanced intake and output will improve  Description  Ability to maintain a balanced intake and output will improve  Outcome: Ongoing     Problem: Health Behavior:  Goal: Identification of resources available to assist in meeting health care needs will improve  Description  Identification of resources available to assist in meeting health care needs will improve  Outcome: Ongoing

## 2019-05-28 NOTE — PROCEDURES
Pt was brought to IR for paracentesis. All four quadrants and midline, imaged under ultra sound guidance. No fluid was noted. Pt returned to room. Report given to nurse.

## 2019-05-28 NOTE — PLAN OF CARE
Problem: Falls - Risk of:  Goal: Will remain free from falls  Description  Will remain free from falls  5/28/2019 0928 by Lisset Plummer RN  Outcome: Ongoing  5/28/2019 0311 by Simno Meredith  Outcome: Ongoing  Goal: Absence of physical injury  Description  Absence of physical injury  5/28/2019 0928 by Lisset Plummer RN  Outcome: Ongoing  5/28/2019 0311 by Simon Meredith  Outcome: Ongoing     Problem: Nutritional:  Goal: Nutritional status will improve  Description  Nutritional status will improve  5/28/2019 0928 by Lisset Plummer RN  Outcome: Ongoing  5/28/2019 0311 by Simon Meredith  Outcome: Ongoing     Problem: Physical Regulation:  Goal: Diagnostic test results will improve  Description  Diagnostic test results will improve  5/28/2019 0928 by Lisset Plummer RN  Outcome: Ongoing  5/28/2019 0311 by Simon Meredith  Outcome: Ongoing  Goal: Will remain free from infection  Description  Will remain free from infection  5/28/2019 0928 by Lisset Plummer RN  Outcome: Ongoing  5/28/2019 0311 by Simon Meredith  Outcome: Ongoing  Goal: Ability to maintain vital signs within normal range will improve  Description  Ability to maintain vital signs within normal range will improve  5/28/2019 0928 by Lisset Plummer RN  Outcome: Ongoing  5/28/2019 0311 by Simon Meredith  Outcome: Ongoing     Problem: Respiratory:  Goal: Ability to maintain normal respiratory secretions will improve  Description  Ability to maintain normal respiratory secretions will improve  5/28/2019 0928 by Lisset Plummer RN  Outcome: Ongoing  5/28/2019 0311 by Simon Meredith  Outcome: Ongoing  Goal: Respiratory status will improve  Description  Respiratory status will improve  5/28/2019 0928 by Lisset Plummer RN  Outcome: Ongoing  5/28/2019 0311 by Simon Meredith  Outcome: Ongoing     Problem: Skin Integrity:  Goal: Demonstration of wound healing without infection will improve  Description  Demonstration of wound healing without infection will improve  5/28/2019 0928 by Radha Yo RN  Outcome: Ongoing  5/28/2019 0311 by Deon Concepcion  Outcome: Ongoing  Goal: Complications related to intravenous access or infusion will be avoided or minimized  Description  Complications related to intravenous access or infusion will be avoided or minimized  5/28/2019 0928 by Radha Yo RN  Outcome: Ongoing  5/28/2019 0311 by Deon Concepcion  Outcome: Ongoing     Problem: Fluid Volume:  Goal: Hemodynamic stability will improve  Description  Hemodynamic stability will improve  5/28/2019 0928 by Radha Yo RN  Outcome: Ongoing  5/28/2019 0311 by Deon Concepcion  Outcome: Ongoing  Goal: Ability to maintain a balanced intake and output will improve  Description  Ability to maintain a balanced intake and output will improve  5/28/2019 0928 by Radha Yo RN  Outcome: Ongoing  5/28/2019 0311 by Deon Concepcion  Outcome: Ongoing     Problem: Health Behavior:  Goal: Identification of resources available to assist in meeting health care needs will improve  Description  Identification of resources available to assist in meeting health care needs will improve  5/28/2019 0928 by Radha Yo RN  Outcome: Ongoing  5/28/2019 0311 by Deon Concepcion  Outcome: Ongoing

## 2019-05-29 LAB
ALPHA-1 ANTITRYPSIN: 184 MG/DL (ref 90–200)
ALPHA-1 ANTITRYPSIN: NORMAL MG/DL (ref 90–200)
ANTI-MITOCHON TITER: 6.5 UNITS (ref 0–20)
ANTI-MITOCHON TITER: NORMAL UNITS (ref 0–20)
ANTI-NUCLEAR ANTIBODY (ANA): ABNORMAL
ANTI-NUCLEAR ANTIBODY (ANA): DETECTED
CERULOPLASMIN: 42 MG/DL (ref 17–54)
CERULOPLASMIN: NORMAL MG/DL (ref 17–54)
F-ACTIN AB, IGG: 27 UNITS (ref 0–19)
F-ACTIN AB, IGG: ABNORMAL UNITS (ref 0–19)
HEPATITIS B CORE TOTAL ANTIBODY: NEGATIVE
HEPATITIS B CORE TOTAL ANTIBODY: NORMAL
MS ALPHA-FETOPROTEIN: 3 NG/ML (ref 0–9)
MS ALPHA-FETOPROTEIN: NORMAL NG/ML (ref 0–9)

## 2019-05-30 LAB
EKG ATRIAL RATE: 92 BPM
EKG DIAGNOSIS: NORMAL
EKG P AXIS: 33 DEGREES
EKG P-R INTERVAL: 170 MS
EKG Q-T INTERVAL: 364 MS
EKG QRS DURATION: 74 MS
EKG QTC CALCULATION (BAZETT): 450 MS
EKG R AXIS: 31 DEGREES
EKG T AXIS: 46 DEGREES
EKG VENTRICULAR RATE: 92 BPM

## 2019-05-31 LAB
ANA CYTOPLASMIC TITER: NORMAL
ANA CYTOPLASMIC TITER: NORMAL
ANA PATTERN: NORMAL
ANA TITER: NORMAL
ANA TITER: NORMAL
ANTINUCLEAR ANTIBODY, HEP-2, IGG: DETECTED
INTERPRETATION: ABNORMAL
INTERPRETATION: ABNORMAL
SMOOTH MUSCLE AB IGG TITER: NORMAL
SMOOTH MUSCLE AB IGG TITER: NORMAL

## 2019-06-02 RX ORDER — BISOPROLOL FUMARATE AND HYDROCHLOROTHIAZIDE 10; 6.25 MG/1; MG/1
1 TABLET ORAL DAILY
COMMUNITY
End: 2019-06-10

## 2019-06-02 RX ORDER — FLUTICASONE PROPIONATE 50 MCG
2 SPRAY, SUSPENSION (ML) NASAL DAILY
COMMUNITY
End: 2019-08-08

## 2019-06-02 RX ORDER — SILDENAFIL 100 MG/1
100 TABLET, FILM COATED ORAL PRN
COMMUNITY
End: 2019-06-10

## 2019-06-02 RX ORDER — TESTOSTERONE 30 MG/1.5ML
2 SOLUTION TOPICAL DAILY
COMMUNITY
End: 2019-06-10

## 2019-06-02 RX ORDER — AMLODIPINE BESYLATE 5 MG/1
5 TABLET ORAL DAILY
COMMUNITY
End: 2019-06-10

## 2019-06-02 RX ORDER — CHLORAL HYDRATE 500 MG
2000 CAPSULE ORAL DAILY
COMMUNITY

## 2019-06-10 ENCOUNTER — OFFICE VISIT (OUTPATIENT)
Dept: FAMILY MEDICINE CLINIC | Age: 74
End: 2019-06-10
Payer: COMMERCIAL

## 2019-06-10 VITALS
SYSTOLIC BLOOD PRESSURE: 120 MMHG | DIASTOLIC BLOOD PRESSURE: 78 MMHG | HEIGHT: 67 IN | OXYGEN SATURATION: 97 % | WEIGHT: 257.2 LBS | HEART RATE: 64 BPM | BODY MASS INDEX: 40.37 KG/M2

## 2019-06-10 DIAGNOSIS — R40.0 DAYTIME SLEEPINESS: ICD-10-CM

## 2019-06-10 DIAGNOSIS — K70.31 ALCOHOLIC CIRRHOSIS OF LIVER WITH ASCITES (HCC): ICD-10-CM

## 2019-06-10 DIAGNOSIS — Z91.81 AT HIGH RISK FOR FALLS: Primary | ICD-10-CM

## 2019-06-10 PROBLEM — Z86.010 HISTORY OF COLONIC POLYPS: Status: ACTIVE | Noted: 2019-01-15

## 2019-06-10 PROBLEM — Z86.0100 HISTORY OF COLONIC POLYPS: Status: ACTIVE | Noted: 2019-01-15

## 2019-06-10 PROCEDURE — 99214 OFFICE O/P EST MOD 30 MIN: CPT | Performed by: FAMILY MEDICINE

## 2019-06-10 PROCEDURE — 1111F DSCHRG MED/CURRENT MED MERGE: CPT | Performed by: FAMILY MEDICINE

## 2019-06-10 RX ORDER — FUROSEMIDE 40 MG/1
40 TABLET ORAL DAILY
Qty: 30 TABLET | Refills: 3 | Status: SHIPPED | OUTPATIENT
Start: 2019-06-10 | End: 2019-11-17 | Stop reason: SDUPTHER

## 2019-06-10 ASSESSMENT — PATIENT HEALTH QUESTIONNAIRE - PHQ9
SUM OF ALL RESPONSES TO PHQ9 QUESTIONS 1 & 2: 0
2. FEELING DOWN, DEPRESSED OR HOPELESS: 0
SUM OF ALL RESPONSES TO PHQ QUESTIONS 1-9: 0
SUM OF ALL RESPONSES TO PHQ QUESTIONS 1-9: 0
1. LITTLE INTEREST OR PLEASURE IN DOING THINGS: 0

## 2019-06-10 ASSESSMENT — ENCOUNTER SYMPTOMS
SHORTNESS OF BREATH: 0
DIARRHEA: 0
VOMITING: 0
ABDOMINAL PAIN: 0
COUGH: 0
NAUSEA: 0

## 2019-06-10 NOTE — PROGRESS NOTES
On the basis of positive falls risk screening, assessment and plan is as follows: medications adjusted- and the pt has stopped using alcohol. Winter Kilgore       Post-Discharge Transitional Care Management Services or Hospital Follow Up      Osvaldo Campbell   YOB: 1945    Date of Office Visit:  6/10/2019  Date of Hospital Admission: 5/26/19  Date of Hospital Discharge: 5/28/19  Readmission Risk Score(high >=14%. Medium >=10%):Readmission Risk Score: 13      Care management risk score Rising risk (score 2-5) and Complex Care (Scores >=6): 0     Non face to face  following discharge, date last encounter closed (first attempt may have been earlier): *No documented post hospital discharge outreach found in the last 14 days *No documented post hospital discharge outreach found in the last 14 days    Call initiated 2 business days of discharge: *No response recorded in the last 14 days     Patient Active Problem List   Diagnosis    Shortness of breath       Allergies   Allergen Reactions    Lisinopril Swelling     Tongue swelling      Zetia [Ezetimibe] Swelling     Facial swelling    Atorvastatin     Codeine Other (See Comments)     Blood pressure drops    Hydrochlorothiazide     Hydroxyzine      Fatigue and depressed    Lexapro [Escitalopram Oxalate]      Increased depression    Pravastatin        Medications listed as ordered at the time of discharge from hospital   Jaylyn Left   Home Medication Instructions MARIE:    Printed on:06/10/19 0705   Medication Information                      ALPRAZolam (XANAX) 0.25 MG tablet  Take 0.25 mg by mouth daily as needed for Anxiety.              DULoxetine (CYMBALTA) 60 MG extended release capsule  Take 60 mg by mouth daily             esomeprazole Magnesium (NEXIUM) 20 MG PACK  Take 20 mg by mouth daily             fluticasone (FLONASE) 50 MCG/ACT nasal spray  2 sprays by Each Nostril route daily             furosemide (LASIX) 40 MG tablet  Take 1 tablet by mouth daily             lactulose (CHRONULAC) 10 GM/15ML solution  Take 15 mLs by mouth 2 times daily as needed (titrate to 1 BM daily) This can help with liver disease associated confusion if it occurs             Magnesium Oxide 200 MG TABS  Take 1 tablet by mouth 2 times daily for 270 doses             Multiple Vitamin (MULTI VITAMIN MENS PO)  Take 1 tablet by mouth daily             nadolol (CORGARD) 40 MG tablet  Take 1 tablet by mouth daily             Omega-3 1000 MG CAPS  Take 1 capsule by mouth daily             spironolactone (ALDACTONE) 50 MG tablet  Take 1 tablet by mouth daily             zinc 50 MG CAPS  Take 1 capsule by mouth daily                   Medications marked \"taking\" at this time  Outpatient Medications Marked as Taking for the 6/10/19 encounter (Office Visit) with KATIE Vaughan   Medication Sig Dispense Refill    furosemide (LASIX) 40 MG tablet Take 1 tablet by mouth daily 30 tablet 3    fluticasone (FLONASE) 50 MCG/ACT nasal spray 2 sprays by Each Nostril route daily      Multiple Vitamin (MULTI VITAMIN MENS PO) Take 1 tablet by mouth daily      esomeprazole Magnesium (NEXIUM) 20 MG PACK Take 20 mg by mouth daily      Omega-3 1000 MG CAPS Take 1 capsule by mouth daily      zinc 50 MG CAPS Take 1 capsule by mouth daily      nadolol (CORGARD) 40 MG tablet Take 1 tablet by mouth daily 90 tablet 3    spironolactone (ALDACTONE) 50 MG tablet Take 1 tablet by mouth daily 90 tablet 3    Magnesium Oxide 200 MG TABS Take 1 tablet by mouth 2 times daily for 270 doses 270 tablet 1    lactulose (CHRONULAC) 10 GM/15ML solution Take 15 mLs by mouth 2 times daily as needed (titrate to 1 BM daily) This can help with liver disease associated confusion if it occurs 473 mL 1    DULoxetine (CYMBALTA) 60 MG extended release capsule Take 60 mg by mouth daily      ALPRAZolam (XANAX) 0.25 MG tablet Take 0.25 mg by mouth daily as needed for Anxiety.           Medications patient taking as of now reconciled against medications ordered at time of hospital discharge: Yes    Chief Complaint   Patient presents with    Follow-Up from Hospital    Edema     bilateral pedal edema greatly improved not gone,sob resolved per pt       Went to the ED for SOB and swollen feet. States his legs have been swollen for a while and didn't know what else to do. Reports that he has quit drinking altogether and has not drank since before his hospital stay. Reports that he has more energy and overall feels better. Used to drink 3-1/2gallons of ETOH a week. His legs are less swollen than previously and the pt states that they are looking better overall. He reports no alcohol detox symptoms, including no hallucinations. Does note a tremor, but that has been present for 7-8 months and started before he had the swelling issues. Has not seen anyone else since getting out of the hospital. Is still taking Cymbalta, and is also taking Spironolactone, Nadolol, Lasix, and magnesium. Inpatient course: Discharge summary reviewed- see chart. Interval history/Current status: ovrerall doing better. States that his tongue still is white on the R side from when it was swollen a few weeks ago. Has complaints about sleep and not restful sleep. Falls asleep through the day. Review of Systems   Constitutional: Negative for chills, fatigue and fever. Respiratory: Negative for cough and shortness of breath. Cardiovascular: Positive for leg swelling (getting better ). Negative for chest pain and palpitations. Gastrointestinal: Negative for abdominal pain, diarrhea, nausea and vomiting. Skin: Negative for rash. Neurological: Positive for tremors (has been present for a few years,). Negative for dizziness, light-headedness and headaches. Psychiatric/Behavioral: Negative for confusion and hallucinations. The patient is not nervous/anxious.         Vitals:    06/10/19 1420   BP: 120/78   Pulse: 64   SpO2: 97%   Weight: 257 lb 3.2 oz (116.7 kg)   Height: 5' 7\" (1.702 m)     Body mass index is 40.28 kg/m². Wt Readings from Last 3 Encounters:   06/10/19 257 lb 3.2 oz (116.7 kg)   05/28/19 279 lb 4.8 oz (126.7 kg)     BP Readings from Last 3 Encounters:   06/10/19 120/78   05/28/19 (!) 155/67       Physical Exam   Constitutional: He is oriented to person, place, and time. He appears well-developed and well-nourished. No distress. HENT:   Head: Normocephalic and atraumatic. Cardiovascular: Normal rate, regular rhythm and normal heart sounds. Pulmonary/Chest: Effort normal and breath sounds normal.   Patient is speaking in full sentences. Lungs CTA bilaterally. Abdominal: Soft. Bowel sounds are normal. He exhibits distension. There is no tenderness. There is no rigidity, no rebound and no guarding. Neurological: He is alert and oriented to person, place, and time. Skin: Skin is warm and dry. He is not diaphoretic. Psychiatric: He has a normal mood and affect. Thought content normal.   Nursing note and vitals reviewed. Assessment/Plan:  1. At high risk for falls  Patient has stopped drinking, and has been started on medication for cirrhosis. Has not had any issues with falls since his hospital stay. 2. Alcoholic cirrhosis of liver with ascites (HCC)  We will also send a referral to Dr. Lanette Cleary office to help us manage the pt's cirrhosis as Dr. Letha Morin saw the pt in the office.  - furosemide (LASIX) 40 MG tablet; Take 1 tablet by mouth daily  Dispense: 30 tablet; Refill: 3  - [place] DISCHARGE MEDS RECONCILED W/ CURRENT OUTPATIENT MED LIST  - External Referral To Gastroenterology    3. Daytime sleepiness  Patient was referred to the sleep center for a sleep study, as this needed to be completed a few years ago but the patient never had it done although he had been referred.   [facility]        Medical Decision Making: low complexity

## 2019-07-17 ENCOUNTER — HOSPITAL ENCOUNTER (OUTPATIENT)
Dept: SLEEP CENTER | Age: 74
Discharge: HOME | End: 2019-07-17
Payer: COMMERCIAL

## 2019-07-17 VITALS
HEIGHT: 68 IN | OXYGEN SATURATION: 100 % | WEIGHT: 256.5 LBS | HEART RATE: 65 BPM | DIASTOLIC BLOOD PRESSURE: 66 MMHG | SYSTOLIC BLOOD PRESSURE: 135 MMHG | BODY MASS INDEX: 38.88 KG/M2

## 2019-07-17 DIAGNOSIS — G47.33 OSA (OBSTRUCTIVE SLEEP APNEA): Primary | ICD-10-CM

## 2019-07-17 PROCEDURE — 99211 OFF/OP EST MAY X REQ PHY/QHP: CPT | Performed by: PSYCHIATRY & NEUROLOGY

## 2019-07-17 ASSESSMENT — SLEEP AND FATIGUE QUESTIONNAIRES
HOW LIKELY ARE YOU TO NOD OFF OR FALL ASLEEP WHILE SITTING QUIETLY AFTER LUNCH WITHOUT ALCOHOL: 3
HOW LIKELY ARE YOU TO NOD OFF OR FALL ASLEEP WHILE SITTING AND READING: 3
HOW LIKELY ARE YOU TO NOD OFF OR FALL ASLEEP WHILE WATCHING TV: 2
HOW LIKELY ARE YOU TO NOD OFF OR FALL ASLEEP WHILE LYING DOWN TO REST IN THE AFTERNOON WHEN CIRCUMSTANCES PERMIT: 3
HOW LIKELY ARE YOU TO NOD OFF OR FALL ASLEEP WHILE SITTING AND TALKING TO SOMEONE: 0
HOW LIKELY ARE YOU TO NOD OFF OR FALL ASLEEP WHILE SITTING INACTIVE IN A PUBLIC PLACE: 2
ESS TOTAL SCORE: 16
HOW LIKELY ARE YOU TO NOD OFF OR FALL ASLEEP IN A CAR, WHILE STOPPED FOR A FEW MINUTES IN TRAFFIC: 0
HOW LIKELY ARE YOU TO NOD OFF OR FALL ASLEEP WHEN YOU ARE A PASSENGER IN A CAR FOR AN HOUR WITHOUT A BREAK: 3

## 2019-07-17 NOTE — PROGRESS NOTES
Nelsy Warren MD, Ronnell Rojo MD, Patrick Sanford MD, Chip Rollins MD, [facility]      [address], [address] [facility]   [place]: [phone]  F: [phone]     Subjective:     Patient ID: Jelly Sommer is a 72 y.o. male, referred to the sleep center for   Chief Complaint   Patient presents with    Daytime Sleepiness   . Referring physician:  Dr Davon Livingston     History:    Snoring for several yrs; not seen to stp breathing during sleep. Feels tired & sleepy during day. Dozes easily reading, watching TV et. Wakes up about 3 times during night.   occasionalt has felt sleepy while driving    Social History     Socioeconomic History    Marital status:      Spouse name: Not on file    Number of children: Not on file    Years of education: Not on file    Highest education level: Not on file   Occupational History     Comment: Retired    Social Needs    Financial resource strain: Not on file    Food insecurity:     Worry: Not on file     Inability: Not on file   [employer] needs:     Medical: Not on file     Non-medical: Not on file   Tobacco Use    Smoking status: Never Smoker    Smokeless tobacco: Never Used   Substance and Sexual Activity    Alcohol use: Yes     Comment: drink 1/3 liter of whiskey everyday    Drug use: Never    Sexual activity: Yes     Partners: Female   Lifestyle    Physical activity:     Days per week: Not on file     Minutes per session: Not on file    Stress: Not on file   Relationships    Social connections:     Talks on phone: Not on file     Gets together: Not on file     Attends [employer] service: Not on file     Active member of club or organization: Not on file     Attends meetings of clubs or organizations: Not on file     Relationship status: Not on file    Intimate partner violence:     Fear of current or ex partner: Not on file     Emotionally Diagnosis    Shortness of breath    History of colonic polyps       Past Medical History:   Diagnosis Date    GERD (gastroesophageal reflux disease) 2000    Hypertension        Past Surgical History:   Procedure Laterality Date    CHOLECYSTECTOMY      VASECTOMY         Family History   Problem Relation Age of Onset    Hypertension Brother     Diabetes Other          Objective:     Vitals:    07/17/19 1024   BP: 135/66   Pulse: 65   SpO2: 100%   Weight: 256 lb 8 oz (116.3 kg)   Height: 5' 8\" (1.727 m)     Neck circumference: 18.25  Inches  [place] - Total score: 16    Gen: No distress. Eyes: PERRL. No sclera icterus. No conjunctival injection. ENT: No discharge. Pharynx clear. External appearance of ears and nose normal.  Neck: Trachea midline. No obvious mass. Resp: No accessory muscle use. No crackles. No wheezes. No rhonchi. No dullness on percussion. CV: Regular rate. Regular rhythm. No murmur or rub. No edema. GI: Non-tender. Non-distended. No hernia. Skin: Warm, dry, normal texture and turgor. No nodule on exposed extremities. Lymph: No cervical LAD. No supraclavicular LAD. M/S: No cyanosis. No clubbing. No joint deformity. Psych: Oriented x 3. No anxiety. Awake. Alert. Intact judgement and insight.     Mallampati Airway Classification:   []1 []2 [x]3 []4        Sleep Complaints/Symptoms:    Normal Bedtime:      Normal Wake Time:   Average Sleep Time:  6-7 hrs    Number of Awakenings:  3 / night    Duration of Sleep Complaints: 6-7  years    [x] Snoring     [] Improved [] Not Improved    [x] Choking/Gasping for Breath  [] Improved [] Not Improved       [] Witnessed Apneas              [] Improved [] Not Improved  [x] Daytime Sleepiness             [] Improved [] Not Improved  [] Morning Headaches    [] Improved [] Not Improved  [x] Frequent Awakenings       [] Improved [] Not Improved  [] Jerky Movements   [] Improved [] Not Improved   [] Restless Legs   [] Improved [] Not

## 2019-08-08 ENCOUNTER — OFFICE VISIT (OUTPATIENT)
Dept: FAMILY MEDICINE CLINIC | Age: 74
End: 2019-08-08
Payer: COMMERCIAL

## 2019-08-08 VITALS
HEIGHT: 67 IN | SYSTOLIC BLOOD PRESSURE: 130 MMHG | OXYGEN SATURATION: 97 % | DIASTOLIC BLOOD PRESSURE: 70 MMHG | BODY MASS INDEX: 41.61 KG/M2 | WEIGHT: 265.1 LBS | HEART RATE: 62 BPM

## 2019-08-08 DIAGNOSIS — K21.9 GASTROESOPHAGEAL REFLUX DISEASE WITHOUT ESOPHAGITIS: ICD-10-CM

## 2019-08-08 DIAGNOSIS — R59.0 LYMPHADENOPATHY, MESENTERIC: ICD-10-CM

## 2019-08-08 DIAGNOSIS — I10 HYPERTENSION, UNSPECIFIED TYPE: ICD-10-CM

## 2019-08-08 DIAGNOSIS — R73.9 HYPERGLYCEMIA: ICD-10-CM

## 2019-08-08 DIAGNOSIS — K70.31 ALCOHOLIC CIRRHOSIS OF LIVER WITH ASCITES (HCC): Primary | ICD-10-CM

## 2019-08-08 DIAGNOSIS — F41.9 ANXIETY: ICD-10-CM

## 2019-08-08 DIAGNOSIS — E78.2 MIXED HYPERLIPIDEMIA: ICD-10-CM

## 2019-08-08 DIAGNOSIS — L30.9 DERMATITIS: ICD-10-CM

## 2019-08-08 DIAGNOSIS — F10.11 HISTORY OF ALCOHOL ABUSE: ICD-10-CM

## 2019-08-08 LAB
A/G RATIO: 1.1 (ref 1.1–2.2)
ALBUMIN SERPL-MCNC: 3.9 G/DL (ref 3.4–5)
ALP BLD-CCNC: 126 U/L (ref 40–129)
ALT SERPL-CCNC: 23 U/L (ref 10–40)
ANION GAP SERPL CALCULATED.3IONS-SCNC: 14 MMOL/L (ref 3–16)
AST SERPL-CCNC: 39 U/L (ref 15–37)
BASOPHILS ABSOLUTE: 0.1 K/UL (ref 0–0.2)
BASOPHILS RELATIVE PERCENT: 0.8 %
BILIRUB SERPL-MCNC: 0.6 MG/DL (ref 0–1)
BUN BLDV-MCNC: 14 MG/DL (ref 7–20)
CALCIUM SERPL-MCNC: 9.9 MG/DL (ref 8.3–10.6)
CHLORIDE BLD-SCNC: 98 MMOL/L (ref 99–110)
CHOLESTEROL, TOTAL: 287 MG/DL (ref 0–199)
CO2: 27 MMOL/L (ref 21–32)
CREAT SERPL-MCNC: 0.9 MG/DL (ref 0.8–1.3)
EOSINOPHILS ABSOLUTE: 0.6 K/UL (ref 0–0.6)
EOSINOPHILS RELATIVE PERCENT: 7.8 %
GFR AFRICAN AMERICAN: >60
GFR NON-AFRICAN AMERICAN: >60
GLOBULIN: 3.4 G/DL
GLUCOSE BLD-MCNC: 131 MG/DL (ref 70–99)
HCT VFR BLD CALC: 39.7 % (ref 40.5–52.5)
HDLC SERPL-MCNC: 70 MG/DL (ref 40–60)
HEMOGLOBIN: 13.5 G/DL (ref 13.5–17.5)
LDL CHOLESTEROL CALCULATED: 194 MG/DL
LYMPHOCYTES ABSOLUTE: 1.6 K/UL (ref 1–5.1)
LYMPHOCYTES RELATIVE PERCENT: 22.9 %
MCH RBC QN AUTO: 33.1 PG (ref 26–34)
MCHC RBC AUTO-ENTMCNC: 33.9 G/DL (ref 31–36)
MCV RBC AUTO: 97.4 FL (ref 80–100)
MONOCYTES ABSOLUTE: 0.8 K/UL (ref 0–1.3)
MONOCYTES RELATIVE PERCENT: 11.2 %
NEUTROPHILS ABSOLUTE: 4.1 K/UL (ref 1.7–7.7)
NEUTROPHILS RELATIVE PERCENT: 57.3 %
PDW BLD-RTO: 14.9 % (ref 12.4–15.4)
PLATELET # BLD: 153 K/UL (ref 135–450)
PMV BLD AUTO: 9 FL (ref 5–10.5)
POTASSIUM SERPL-SCNC: 4.3 MMOL/L (ref 3.5–5.1)
RBC # BLD: 4.07 M/UL (ref 4.2–5.9)
SODIUM BLD-SCNC: 139 MMOL/L (ref 136–145)
T4 FREE: 0.8 NG/DL (ref 0.9–1.8)
TOTAL PROTEIN: 7.3 G/DL (ref 6.4–8.2)
TRIGL SERPL-MCNC: 113 MG/DL (ref 0–150)
TSH REFLEX FT4: 4.22 UIU/ML (ref 0.27–4.2)
VLDLC SERPL CALC-MCNC: 23 MG/DL
WBC # BLD: 7.1 K/UL (ref 4–11)

## 2019-08-08 PROCEDURE — 99214 OFFICE O/P EST MOD 30 MIN: CPT | Performed by: PHYSICIAN ASSISTANT

## 2019-08-08 RX ORDER — PREDNISONE 1 MG/1
TABLET ORAL
Qty: 30 TABLET | Refills: 0 | Status: SHIPPED | OUTPATIENT
Start: 2019-08-08 | End: 2019-12-04

## 2019-08-09 ENCOUNTER — TELEPHONE (OUTPATIENT)
Dept: FAMILY MEDICINE CLINIC | Age: 74
End: 2019-08-09

## 2019-08-09 LAB
ESTIMATED AVERAGE GLUCOSE: 139.9 MG/DL
HBA1C MFR BLD: 6.5 %

## 2019-08-09 NOTE — TELEPHONE ENCOUNTER
SPOKE WITH PTS WIFE STEPAN AT 7188 AM REGARD. MESSAGE BELOW PER ERON WILSON PTS WIFE VOICED UNDERSTANDING. Electronically signed by Melba Rodas LPN on 8/0/0320 at 54:57 AM      ----- Message from Chris Dickerson, [id number] sent at 8/9/2019 10:14 AM EDT -----  His CBC is stable, and is slightly improving. His CMP looks fairly stable and his AST is improving. Needs to make sure he is taking his omega-3's as he has allergies to statins. Cholesterol is elevated, and so he needs to avoid red meats, foods high in cholesterol, and high in fat. Needs to increase his activity if possible. TSH and low T4 indicates hypothyroidism. Will start him on levothyroxine 25 mcg, 1 tablet p.o. daily, and then recheck his labs in 6 to 8 weeks. Pt is to have his labs rechecked in about 1-2 months.      Thanks, Carla

## 2019-08-12 ENCOUNTER — HOSPITAL ENCOUNTER (OUTPATIENT)
Dept: SLEEP CENTER | Age: 74
Discharge: HOME | End: 2019-08-12
Payer: COMMERCIAL

## 2019-08-12 PROCEDURE — 95811 POLYSOM 6/>YRS CPAP 4/> PARM: CPT

## 2019-08-22 ENCOUNTER — TELEPHONE (OUTPATIENT)
Dept: FAMILY MEDICINE CLINIC | Age: 74
End: 2019-08-22

## 2019-08-22 DIAGNOSIS — E03.9 HYPOTHYROIDISM, UNSPECIFIED TYPE: Primary | ICD-10-CM

## 2019-08-22 RX ORDER — LEVOTHYROXINE SODIUM 0.03 MG/1
25 TABLET ORAL DAILY
COMMUNITY
End: 2019-08-22 | Stop reason: SDUPTHER

## 2019-08-22 RX ORDER — LEVOTHYROXINE SODIUM 0.03 MG/1
25 TABLET ORAL DAILY
Qty: 30 TABLET | Refills: 1 | Status: SHIPPED | OUTPATIENT
Start: 2019-08-22 | End: 2019-10-15 | Stop reason: SDUPTHER

## 2019-08-22 NOTE — TELEPHONE ENCOUNTER
Call and left message for patient regarding his message and that we did send his medication today and if he has anymore question to please return our call and that he has an upcoming appt 9/9/19 with Misty.

## 2019-08-27 ENCOUNTER — TELEPHONE (OUTPATIENT)
Dept: FAMILY MEDICINE CLINIC | Age: 74
End: 2019-08-27

## 2019-08-27 NOTE — TELEPHONE ENCOUNTER
----- Message from Farhana Zhang sent at 8/27/2019  3:23 PM EDT -----  Need orders for ct to be sent to [facility] per patient request they where faxed 8/27/19 @ 4:00pm.

## 2019-09-03 ENCOUNTER — TELEPHONE (OUTPATIENT)
Dept: FAMILY MEDICINE CLINIC | Age: 74
End: 2019-09-03

## 2019-09-03 NOTE — TELEPHONE ENCOUNTER
Patient advised CT and states he will see GI specialist 09/04/2019 and will follow up on 09/09/2019 with Misty-Pac.

## 2019-09-03 NOTE — TELEPHONE ENCOUNTER
CT abdomen pelvis with enlarged lymph nodes unchanged from May 2019. 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 Hypodense liver lesion noted. Will need a follow-up CT of the chest in 3 to 6 months for pleural nodule. .  Avascular necrosis of the left femoral head. He should be seeing GI please make sure he is seeing them. He needs to follow-up here as ordered.

## 2019-09-09 ENCOUNTER — OFFICE VISIT (OUTPATIENT)
Dept: FAMILY MEDICINE CLINIC | Age: 74
End: 2019-09-09
Payer: COMMERCIAL

## 2019-09-09 VITALS
HEIGHT: 67 IN | WEIGHT: 255.3 LBS | HEART RATE: 60 BPM | DIASTOLIC BLOOD PRESSURE: 66 MMHG | SYSTOLIC BLOOD PRESSURE: 118 MMHG | BODY MASS INDEX: 40.07 KG/M2

## 2019-09-09 DIAGNOSIS — E78.2 MIXED HYPERLIPIDEMIA: ICD-10-CM

## 2019-09-09 DIAGNOSIS — R91.8 PULMONARY NODULES: ICD-10-CM

## 2019-09-09 DIAGNOSIS — E11.9 TYPE 2 DIABETES MELLITUS WITHOUT COMPLICATION, WITHOUT LONG-TERM CURRENT USE OF INSULIN (HCC): ICD-10-CM

## 2019-09-09 DIAGNOSIS — R59.0 LYMPHADENOPATHY, MESENTERIC: ICD-10-CM

## 2019-09-09 DIAGNOSIS — E03.9 HYPOTHYROIDISM, UNSPECIFIED TYPE: Primary | ICD-10-CM

## 2019-09-09 DIAGNOSIS — I10 HYPERTENSION, UNSPECIFIED TYPE: ICD-10-CM

## 2019-09-09 PROCEDURE — 99214 OFFICE O/P EST MOD 30 MIN: CPT | Performed by: PHYSICIAN ASSISTANT

## 2019-09-09 NOTE — PROGRESS NOTES
mouth daily      nadolol (CORGARD) 40 MG tablet Take 1 tablet by mouth daily 90 tablet 3    spironolactone (ALDACTONE) 50 MG tablet Take 1 tablet by mouth daily 90 tablet 3    Magnesium Oxide 200 MG TABS Take 1 tablet by mouth 2 times daily for 270 doses 270 tablet 1    DULoxetine (CYMBALTA) 60 MG extended release capsule Take 60 mg by mouth daily      ALPRAZolam (XANAX) 0.25 MG tablet Take 0.25 mg by mouth daily as needed for Anxiety. No current facility-administered medications for this visit.         ALLERGIES  Allergies   Allergen Reactions    Lisinopril Swelling     Tongue swelling      Zetia [Ezetimibe] Swelling     Facial swelling    Atorvastatin     Codeine Other (See Comments)     Blood pressure drops    Hydrochlorothiazide     Hydroxyzine      Fatigue and depressed    Lexapro [Escitalopram Oxalate]      Increased depression    Pravastatin        PHYSICAL EXAM    /66 (Site: Left Upper Arm, Position: Sitting)   Pulse 60   Ht 5' 7\" (1.702 m)   Wt 255 lb 4.8 oz (115.8 kg)   BMI 39.99 kg/m²     Constitutional:  Well developed, well nourished  HENT:  Normocephalic, atraumatic, bilateral external ears normal, oropharynx moist, nose normal  Eyes:  PERRLA, EOMI, conjunctiva normal, no discharge, no scleral icterus  Neck:  Normal range of motion, no tenderness, supple, no thyromegaly no carotid bruits noted  Lymphatic:  No lymphadenopathy noted  Cardiovascular:  Normal heart rate, normal rhythm, no murmurs, gallops or rubs  Thorax & Lungs:  Normal breath sounds, no respiratory distress, no wheezing  Abdomen:  Soft, no tenderness, no masses, no pulsatile masses, not distended, bowel sounds normal  Skin:  Warm, dry  Back:  No tenderness, No CVA tenderness  Extremities:  No edema, no tenderness, no cyanosis, no clubbing trace DP pulses  Musculoskeletal:  Good range of motion  all major joints, no tenderness to palpation or major deformities noted  Neurologic:  Alert & oriented X 3, normal

## 2019-10-14 DIAGNOSIS — E78.2 MIXED HYPERLIPIDEMIA: ICD-10-CM

## 2019-10-14 DIAGNOSIS — E03.9 HYPOTHYROIDISM, UNSPECIFIED TYPE: ICD-10-CM

## 2019-10-15 DIAGNOSIS — E03.9 HYPOTHYROIDISM, UNSPECIFIED TYPE: ICD-10-CM

## 2019-10-15 LAB
A/G RATIO: 1.1 (ref 1.1–2.2)
ALBUMIN SERPL-MCNC: 4 G/DL (ref 3.4–5)
ALP BLD-CCNC: 142 U/L (ref 40–129)
ALT SERPL-CCNC: 28 U/L (ref 10–40)
ANION GAP SERPL CALCULATED.3IONS-SCNC: 14 MMOL/L (ref 3–16)
AST SERPL-CCNC: 40 U/L (ref 15–37)
BILIRUB SERPL-MCNC: 0.8 MG/DL (ref 0–1)
BUN BLDV-MCNC: 14 MG/DL (ref 7–20)
CALCIUM SERPL-MCNC: 10.6 MG/DL (ref 8.3–10.6)
CHLORIDE BLD-SCNC: 99 MMOL/L (ref 99–110)
CHOLESTEROL, TOTAL: 230 MG/DL (ref 0–199)
CO2: 28 MMOL/L (ref 21–32)
CREAT SERPL-MCNC: 0.9 MG/DL (ref 0.8–1.3)
GFR AFRICAN AMERICAN: >60
GFR NON-AFRICAN AMERICAN: >60
GLOBULIN: 3.5 G/DL
GLUCOSE BLD-MCNC: 117 MG/DL (ref 70–99)
HDLC SERPL-MCNC: 62 MG/DL (ref 40–60)
LDL CHOLESTEROL CALCULATED: 141 MG/DL
POTASSIUM SERPL-SCNC: 4.4 MMOL/L (ref 3.5–5.1)
SODIUM BLD-SCNC: 141 MMOL/L (ref 136–145)
T4 FREE: 0.9 NG/DL (ref 0.9–1.8)
TOTAL PROTEIN: 7.5 G/DL (ref 6.4–8.2)
TRIGL SERPL-MCNC: 135 MG/DL (ref 0–150)
TSH SERPL DL<=0.05 MIU/L-ACNC: 1.39 UIU/ML (ref 0.27–4.2)
VLDLC SERPL CALC-MCNC: 27 MG/DL

## 2019-10-16 RX ORDER — LEVOTHYROXINE SODIUM 25 UG/1
TABLET ORAL
Qty: 30 TABLET | Refills: 1 | Status: SHIPPED | OUTPATIENT
Start: 2019-10-16 | End: 2019-12-04

## 2019-10-23 ENCOUNTER — TELEPHONE (OUTPATIENT)
Dept: FAMILY MEDICINE CLINIC | Age: 74
End: 2019-10-23

## 2019-10-31 ENCOUNTER — TELEPHONE (OUTPATIENT)
Dept: FAMILY MEDICINE CLINIC | Age: 74
End: 2019-10-31

## 2019-11-08 ENCOUNTER — OFFICE VISIT (OUTPATIENT)
Dept: FAMILY MEDICINE CLINIC | Age: 74
End: 2019-11-08
Payer: COMMERCIAL

## 2019-11-08 VITALS
BODY MASS INDEX: 40.81 KG/M2 | DIASTOLIC BLOOD PRESSURE: 80 MMHG | HEIGHT: 67 IN | WEIGHT: 260 LBS | HEART RATE: 56 BPM | SYSTOLIC BLOOD PRESSURE: 136 MMHG

## 2019-11-08 DIAGNOSIS — M62.838 TRAPEZIUS MUSCLE SPASM: ICD-10-CM

## 2019-11-08 DIAGNOSIS — M54.2 ACUTE NECK PAIN: Primary | ICD-10-CM

## 2019-11-08 PROCEDURE — 99213 OFFICE O/P EST LOW 20 MIN: CPT | Performed by: PHYSICIAN ASSISTANT

## 2019-11-08 RX ORDER — TIZANIDINE 2 MG/1
2 TABLET ORAL EVERY 8 HOURS PRN
Qty: 30 TABLET | Refills: 0 | Status: SHIPPED | OUTPATIENT
Start: 2019-11-08 | End: 2021-05-07 | Stop reason: SDUPTHER

## 2019-11-18 ENCOUNTER — HOSPITAL ENCOUNTER (OUTPATIENT)
Age: 74
Discharge: HOME | End: 2019-11-18
Payer: COMMERCIAL

## 2019-11-18 ENCOUNTER — HOSPITAL ENCOUNTER (OUTPATIENT)
Dept: GENERAL RADIOLOGY | Age: 74
Discharge: HOME | End: 2019-11-18
Payer: COMMERCIAL

## 2019-11-18 DIAGNOSIS — M54.2 ACUTE NECK PAIN: ICD-10-CM

## 2019-11-18 PROCEDURE — 72040 X-RAY EXAM NECK SPINE 2-3 VW: CPT

## 2019-12-04 ENCOUNTER — HOSPITAL ENCOUNTER (OUTPATIENT)
Dept: SLEEP CENTER | Age: 74
Discharge: HOME | End: 2019-12-04
Payer: COMMERCIAL

## 2019-12-04 ENCOUNTER — HOSPITAL ENCOUNTER (OUTPATIENT)
Age: 74
Setting detail: SPECIMEN
Discharge: HOME | End: 2019-12-04
Payer: COMMERCIAL

## 2019-12-04 ENCOUNTER — OFFICE VISIT (OUTPATIENT)
Dept: FAMILY MEDICINE CLINIC | Age: 74
End: 2019-12-04
Payer: COMMERCIAL

## 2019-12-04 VITALS
SYSTOLIC BLOOD PRESSURE: 122 MMHG | HEART RATE: 64 BPM | BODY MASS INDEX: 40.46 KG/M2 | DIASTOLIC BLOOD PRESSURE: 84 MMHG | HEIGHT: 67 IN | WEIGHT: 257.8 LBS

## 2019-12-04 DIAGNOSIS — K70.31 ALCOHOLIC CIRRHOSIS OF LIVER WITH ASCITES (HCC): ICD-10-CM

## 2019-12-04 DIAGNOSIS — E03.9 ACQUIRED HYPOTHYROIDISM: ICD-10-CM

## 2019-12-04 DIAGNOSIS — F10.11 HISTORY OF ALCOHOL ABUSE: ICD-10-CM

## 2019-12-04 DIAGNOSIS — R21 RASH: ICD-10-CM

## 2019-12-04 DIAGNOSIS — I10 ESSENTIAL HYPERTENSION: ICD-10-CM

## 2019-12-04 DIAGNOSIS — R73.9 HYPERGLYCEMIA: ICD-10-CM

## 2019-12-04 DIAGNOSIS — F41.9 ANXIETY: ICD-10-CM

## 2019-12-04 DIAGNOSIS — M54.12 CERVICAL RADICULOPATHY: Primary | ICD-10-CM

## 2019-12-04 LAB
A/G RATIO: 0.9 (ref 1.1–2.2)
ALBUMIN SERPL-MCNC: 4 G/DL (ref 3.4–5)
ALP BLD-CCNC: 114 U/L (ref 40–129)
ALT SERPL-CCNC: 23 U/L (ref 10–40)
AMMONIA: 107 UMOL/L (ref 16–60)
ANION GAP SERPL CALCULATED.3IONS-SCNC: 14 MMOL/L (ref 3–16)
AST SERPL-CCNC: 36 U/L (ref 15–37)
BILIRUB SERPL-MCNC: 0.7 MG/DL (ref 0–1)
BUN BLDV-MCNC: 18 MG/DL (ref 7–20)
CALCIUM SERPL-MCNC: 10.4 MG/DL (ref 8.3–10.6)
CHLORIDE BLD-SCNC: 99 MMOL/L (ref 99–110)
CO2: 28 MMOL/L (ref 21–32)
CREAT SERPL-MCNC: 1 MG/DL (ref 0.8–1.3)
GFR AFRICAN AMERICAN: >60
GFR NON-AFRICAN AMERICAN: >60
GLOBULIN: 4.3 G/DL
GLUCOSE BLD-MCNC: 94 MG/DL (ref 70–99)
HCT VFR BLD CALC: 43.2 % (ref 40.5–52.5)
HEMOGLOBIN: 14.8 G/DL (ref 13.5–17.5)
MCH RBC QN AUTO: 33.8 PG (ref 26–34)
MCHC RBC AUTO-ENTMCNC: 34.1 G/DL (ref 31–36)
MCV RBC AUTO: 98.9 FL (ref 80–100)
PDW BLD-RTO: 13.7 % (ref 12.4–15.4)
PLATELET # BLD: 177 K/UL (ref 135–450)
PMV BLD AUTO: 10 FL (ref 5–10.5)
POTASSIUM SERPL-SCNC: 5 MMOL/L (ref 3.5–5.1)
RBC # BLD: 4.37 M/UL (ref 4.2–5.9)
SODIUM BLD-SCNC: 141 MMOL/L (ref 136–145)
T4 FREE: 0.8 NG/DL (ref 0.9–1.8)
TOTAL PROTEIN: 8.3 G/DL (ref 6.4–8.2)
TSH REFLEX: 2.67 UIU/ML (ref 0.27–4.2)
WBC # BLD: 8.6 K/UL (ref 4–11)

## 2019-12-04 PROCEDURE — 82140 ASSAY OF AMMONIA: CPT

## 2019-12-04 PROCEDURE — 9990000010 HC NO CHARGE VISIT: Performed by: PSYCHIATRY & NEUROLOGY

## 2019-12-04 PROCEDURE — 99214 OFFICE O/P EST MOD 30 MIN: CPT | Performed by: FAMILY MEDICINE

## 2019-12-04 RX ORDER — FUROSEMIDE 40 MG/1
TABLET ORAL
Qty: 30 TABLET | Refills: 5 | Status: SHIPPED | OUTPATIENT
Start: 2019-12-04 | End: 2020-02-21 | Stop reason: SDUPTHER

## 2019-12-04 RX ORDER — METHYLPREDNISOLONE 4 MG/1
TABLET ORAL
Qty: 1 KIT | Refills: 0 | Status: SHIPPED | OUTPATIENT
Start: 2019-12-04 | End: 2019-12-10

## 2019-12-04 RX ORDER — MAGNESIUM OXIDE 400 MG/1
200 TABLET ORAL 2 TIMES DAILY
Refills: 1 | COMMUNITY
Start: 2019-10-15 | End: 2022-07-12

## 2019-12-04 RX ORDER — LEVOTHYROXINE SODIUM 0.03 MG/1
25 TABLET ORAL DAILY
COMMUNITY
End: 2019-12-04 | Stop reason: SDUPTHER

## 2019-12-04 RX ORDER — DULOXETIN HYDROCHLORIDE 60 MG/1
CAPSULE, DELAYED RELEASE ORAL
Qty: 30 CAPSULE | Refills: 5 | Status: SHIPPED | OUTPATIENT
Start: 2019-12-04 | End: 2020-02-21 | Stop reason: SDUPTHER

## 2019-12-04 RX ORDER — LEVOTHYROXINE SODIUM 0.03 MG/1
25 TABLET ORAL DAILY
Qty: 30 TABLET | Refills: 5 | Status: SHIPPED | OUTPATIENT
Start: 2019-12-04 | End: 2020-02-21 | Stop reason: SDUPTHER

## 2019-12-04 RX ORDER — NADOLOL 40 MG/1
40 TABLET ORAL DAILY
Qty: 30 TABLET | Refills: 5 | Status: SHIPPED | OUTPATIENT
Start: 2019-12-04 | End: 2020-02-21 | Stop reason: SDUPTHER

## 2019-12-04 RX ORDER — SPIRONOLACTONE 50 MG/1
50 TABLET, FILM COATED ORAL DAILY
Qty: 30 TABLET | Refills: 5 | Status: SHIPPED | OUTPATIENT
Start: 2019-12-04 | End: 2020-02-21 | Stop reason: SDUPTHER

## 2019-12-04 ASSESSMENT — ENCOUNTER SYMPTOMS
ABDOMINAL PAIN: 0
SHORTNESS OF BREATH: 0
CHEST TIGHTNESS: 0

## 2019-12-05 LAB
ESTIMATED AVERAGE GLUCOSE: 125.5 MG/DL
HBA1C MFR BLD: 6 %

## 2019-12-16 ENCOUNTER — TELEPHONE (OUTPATIENT)
Dept: FAMILY MEDICINE CLINIC | Age: 74
End: 2019-12-16

## 2019-12-23 RX ORDER — METHYLPREDNISOLONE 4 MG/1
TABLET ORAL
Qty: 1 KIT | Refills: 0 | Status: SHIPPED | OUTPATIENT
Start: 2019-12-23 | End: 2019-12-29

## 2020-02-21 ENCOUNTER — OFFICE VISIT (OUTPATIENT)
Dept: FAMILY MEDICINE CLINIC | Age: 75
End: 2020-02-21
Payer: COMMERCIAL

## 2020-02-21 VITALS
SYSTOLIC BLOOD PRESSURE: 124 MMHG | DIASTOLIC BLOOD PRESSURE: 72 MMHG | HEIGHT: 67 IN | WEIGHT: 273 LBS | HEART RATE: 105 BPM | BODY MASS INDEX: 42.85 KG/M2

## 2020-02-21 DIAGNOSIS — I10 ESSENTIAL HYPERTENSION: ICD-10-CM

## 2020-02-21 DIAGNOSIS — R73.9 HYPERGLYCEMIA: ICD-10-CM

## 2020-02-21 DIAGNOSIS — K70.31 ALCOHOLIC CIRRHOSIS OF LIVER WITH ASCITES (HCC): ICD-10-CM

## 2020-02-21 LAB
A/G RATIO: 1 (ref 1.1–2.2)
ALBUMIN SERPL-MCNC: 4 G/DL (ref 3.4–5)
ALP BLD-CCNC: 99 U/L (ref 40–129)
ALT SERPL-CCNC: 22 U/L (ref 10–40)
AMMONIA: 105 UMOL/L (ref 16–60)
ANION GAP SERPL CALCULATED.3IONS-SCNC: 12 MMOL/L (ref 3–16)
AST SERPL-CCNC: 40 U/L (ref 15–37)
BILIRUB SERPL-MCNC: 0.5 MG/DL (ref 0–1)
BUN BLDV-MCNC: 14 MG/DL (ref 7–20)
CALCIUM SERPL-MCNC: 10.4 MG/DL (ref 8.3–10.6)
CHLORIDE BLD-SCNC: 99 MMOL/L (ref 99–110)
CO2: 30 MMOL/L (ref 21–32)
CREAT SERPL-MCNC: 1.1 MG/DL (ref 0.8–1.3)
GFR AFRICAN AMERICAN: >60
GFR NON-AFRICAN AMERICAN: >60
GLOBULIN: 4.1 G/DL
GLUCOSE BLD-MCNC: 147 MG/DL (ref 70–99)
POTASSIUM SERPL-SCNC: 4.4 MMOL/L (ref 3.5–5.1)
SODIUM BLD-SCNC: 141 MMOL/L (ref 136–145)
TOTAL PROTEIN: 8.1 G/DL (ref 6.4–8.2)

## 2020-02-21 PROCEDURE — 99214 OFFICE O/P EST MOD 30 MIN: CPT | Performed by: FAMILY MEDICINE

## 2020-02-21 RX ORDER — FUROSEMIDE 40 MG/1
TABLET ORAL
Qty: 90 TABLET | Refills: 1 | Status: SHIPPED | OUTPATIENT
Start: 2020-02-21 | End: 2020-06-22 | Stop reason: SDUPTHER

## 2020-02-21 RX ORDER — LEVOTHYROXINE SODIUM 0.03 MG/1
25 TABLET ORAL DAILY
Qty: 90 TABLET | Refills: 1 | Status: SHIPPED | OUTPATIENT
Start: 2020-02-21 | End: 2021-05-07 | Stop reason: SDUPTHER

## 2020-02-21 RX ORDER — DULOXETIN HYDROCHLORIDE 60 MG/1
CAPSULE, DELAYED RELEASE ORAL
Qty: 90 CAPSULE | Refills: 1 | Status: SHIPPED | OUTPATIENT
Start: 2020-02-21 | End: 2020-09-21 | Stop reason: SDUPTHER

## 2020-02-21 RX ORDER — SPIRONOLACTONE 50 MG/1
50 TABLET, FILM COATED ORAL DAILY
Qty: 90 TABLET | Refills: 1 | Status: SHIPPED | OUTPATIENT
Start: 2020-02-21 | End: 2020-09-21 | Stop reason: SDUPTHER

## 2020-02-21 RX ORDER — NADOLOL 40 MG/1
40 TABLET ORAL DAILY
Qty: 90 TABLET | Refills: 1 | Status: SHIPPED | OUTPATIENT
Start: 2020-02-21 | End: 2020-09-21 | Stop reason: SDUPTHER

## 2020-02-21 ASSESSMENT — ENCOUNTER SYMPTOMS
BLOOD IN STOOL: 0
WHEEZING: 0
EYE PAIN: 0
NAUSEA: 0
SHORTNESS OF BREATH: 0
ABDOMINAL PAIN: 0
CHEST TIGHTNESS: 0
VOMITING: 0
DIARRHEA: 0
TROUBLE SWALLOWING: 0

## 2020-02-21 ASSESSMENT — PATIENT HEALTH QUESTIONNAIRE - PHQ9
SUM OF ALL RESPONSES TO PHQ9 QUESTIONS 1 & 2: 0
SUM OF ALL RESPONSES TO PHQ QUESTIONS 1-9: 0
SUM OF ALL RESPONSES TO PHQ QUESTIONS 1-9: 0
1. LITTLE INTEREST OR PLEASURE IN DOING THINGS: 0
2. FEELING DOWN, DEPRESSED OR HOPELESS: 0

## 2020-02-21 NOTE — PROGRESS NOTES
Musculoskeletal: Negative for arthralgias, gait problem, myalgias and neck stiffness. Skin: Negative for rash. Patient with pigmented skin lesions anterior chest- lesion on left anterior chest appears more concerning and needs to be evaluated by dermatology   Allergic/Immunologic: Negative for environmental allergies. Neurological: Negative for dizziness, seizures, speech difficulty, weakness and headaches. Patient with complaints of left neck and left arm pain and paresthesia. No weakness or rash reported. Hematological: Does not bruise/bleed easily. Psychiatric/Behavioral: Negative for agitation, confusion, decreased concentration, dysphoric mood and hallucinations. The patient is not nervous/anxious.         PAST MEDICAL HISTORY  Past Medical History:   Diagnosis Date    GERD (gastroesophageal reflux disease) 2000    Hypertension        FAMILY HISTORY  Family History   Problem Relation Age of Onset    Hypertension Brother     Diabetes Other        SOCIAL HISTORY  Social History     Socioeconomic History    Marital status:      Spouse name: None    Number of children: None    Years of education: None    Highest education level: None   Occupational History     Comment: Retired    Social Needs    Financial resource strain: None    Food insecurity:     Worry: None     Inability: None    Transportation needs:     Medical: None     Non-medical: None   Tobacco Use    Smoking status: Never Smoker    Smokeless tobacco: Never Used   Substance and Sexual Activity    Alcohol use: Yes     Comment: drink 1/3 liter of whiskey everyday    Drug use: Never    Sexual activity: Yes     Partners: Female   Lifestyle    Physical activity:     Days per week: None     Minutes per session: None    Stress: None   Relationships    Social connections:     Talks on phone: None     Gets together: None     Attends Mosque service: None     Active member of club or organization: None Attends meetings of clubs or organizations: None     Relationship status: None    Intimate partner violence:     Fear of current or ex partner: None     Emotionally abused: None     Physically abused: None     Forced sexual activity: None   Other Topics Concern    None   Social History Narrative    None        SURGICAL HISTORY  Past Surgical History:   Procedure Laterality Date    CHOLECYSTECTOMY      VASECTOMY                   CURRENT MEDICATIONS  Current Outpatient Medications   Medication Sig Dispense Refill    spironolactone (ALDACTONE) 50 MG tablet Take 1 tablet by mouth daily 90 tablet 1    nadolol (CORGARD) 40 MG tablet Take 1 tablet by mouth daily 90 tablet 1    levothyroxine (SYNTHROID) 25 MCG tablet Take 1 tablet by mouth Daily 90 tablet 1    furosemide (LASIX) 40 MG tablet TAKE 1 TABLET BY MOUTH ONCE DAILY 90 tablet 1    DULoxetine (CYMBALTA) 60 MG extended release capsule TAKE 1 CAPSULE BY MOUTH ONCE DAILY 90 capsule 1    magnesium oxide (MAG-OX) 400 MG tablet TAKE 1 2 (ONE HALF) TABLET BY MOUTH TWICE DAILY  1    tiZANidine (ZANAFLEX) 2 MG tablet Take 1 tablet by mouth every 8 hours as needed (muscle spasms) 30 tablet 0    Multiple Vitamin (MULTI VITAMIN MENS PO) Take 1 tablet by mouth daily      esomeprazole Magnesium (NEXIUM) 20 MG PACK Take 20 mg by mouth daily      Omega-3 1000 MG CAPS Take 1 capsule by mouth daily      ALPRAZolam (XANAX) 0.25 MG tablet Take 0.25 mg by mouth daily as needed for Anxiety.  Lactulose (CONSTULOSE PO) Take by mouth       No current facility-administered medications for this visit.         ALLERGIES  Allergies   Allergen Reactions    Lisinopril Swelling     Tongue swelling      Zetia [Ezetimibe] Swelling     Facial swelling    Atorvastatin     Codeine Other (See Comments)     Blood pressure drops    Hydrochlorothiazide     Hydroxyzine      Fatigue and depressed    Lexapro [Escitalopram Oxalate]      Increased depression    Pravastatin PHYSICAL EXAM    /72 (Site: Right Upper Arm, Position: Sitting, Cuff Size: Large Adult)   Pulse 105   Ht 5' 7\" (1.702 m)   Wt 273 lb (123.8 kg)   BMI 42.76 kg/m²     Physical Exam  Vitals signs and nursing note reviewed. Constitutional:       Appearance: He is well-developed. HENT:      Head: Normocephalic and atraumatic. Eyes:      Pupils: Pupils are equal, round, and reactive to light. Comments: She with bilateral multiple skin tags on his eyelids couple which are enlarging and appearing his visual field   Neck:      Musculoskeletal: Normal range of motion and neck supple. Cardiovascular:      Rate and Rhythm: Normal rate and regular rhythm. Heart sounds: Normal heart sounds. Pulmonary:      Effort: Pulmonary effort is normal.      Breath sounds: Normal breath sounds. Abdominal:      Palpations: Abdomen is soft. Musculoskeletal:      Comments: Patient with left neck and left arm pain worse with position change -especially with elevation of or extension of his neck and looking upward. Some movement of the neck with elevation really makes his paresthesia worse. No evidence of rash or atrophy. Skin:     General: Skin is warm and dry. Comments: Patient with pigmented seborrheic keratosis right anterior chest seems to be nonchanging and not suspicious does have a pigmented lesion on left anterior chest that does appear little bit suspicious for more serious melanoma type lesion. Will advise him to see dermatology for skin exam and possible biopsy   Neurological:      Mental Status: He is alert and oriented to person, place, and time. Psychiatric:         Thought Content: Thought content normal.         ASSESSMENT & PLAN     Diagnosis Orders   1. Left cervical radiculopathy  MRI CERVICAL SPINE WO CONTRAST   2. Alcoholic cirrhosis of liver with ascites (HCC)  furosemide (LASIX) 40 MG tablet    AMMONIA   3. Essential hypertension  COMPREHENSIVE METABOLIC PANEL   4.

## 2020-02-22 LAB
ESTIMATED AVERAGE GLUCOSE: 125.5 MG/DL
HBA1C MFR BLD: 6 %

## 2020-03-11 ENCOUNTER — TELEPHONE (OUTPATIENT)
Dept: FAMILY MEDICINE CLINIC | Age: 75
End: 2020-03-11

## 2020-03-11 NOTE — TELEPHONE ENCOUNTER
ACCORDING TO SAMANTHA BLOOD), PATIENT HAS AN APPT AT [facility] TOMORROW AT 11:00 (3/12/20)    PATIENT WOULD LIKE THE ORDER FOR THE MRI CERVICAL SPINE WO CONTRAST, TO BE SENT OVER TO [facility].     FAX# [phone]  (PLEASE FAX OVER ORDER BEFORE TOMORROW)

## 2020-06-22 ENCOUNTER — OFFICE VISIT (OUTPATIENT)
Dept: FAMILY MEDICINE CLINIC | Age: 75
End: 2020-06-22
Payer: COMMERCIAL

## 2020-06-22 VITALS
HEIGHT: 67 IN | WEIGHT: 287 LBS | SYSTOLIC BLOOD PRESSURE: 148 MMHG | OXYGEN SATURATION: 97 % | TEMPERATURE: 98 F | HEART RATE: 63 BPM | BODY MASS INDEX: 45.04 KG/M2 | DIASTOLIC BLOOD PRESSURE: 76 MMHG

## 2020-06-22 DIAGNOSIS — I10 ESSENTIAL HYPERTENSION: ICD-10-CM

## 2020-06-22 DIAGNOSIS — E03.9 ACQUIRED HYPOTHYROIDISM: ICD-10-CM

## 2020-06-22 DIAGNOSIS — K70.31 ALCOHOLIC CIRRHOSIS OF LIVER WITH ASCITES (HCC): ICD-10-CM

## 2020-06-22 DIAGNOSIS — R73.9 HYPERGLYCEMIA: ICD-10-CM

## 2020-06-22 LAB
HCT VFR BLD CALC: 41.6 % (ref 40.5–52.5)
HEMOGLOBIN: 14.1 G/DL (ref 13.5–17.5)
MCH RBC QN AUTO: 33.1 PG (ref 26–34)
MCHC RBC AUTO-ENTMCNC: 34 G/DL (ref 31–36)
MCV RBC AUTO: 97.4 FL (ref 80–100)
PDW BLD-RTO: 14.7 % (ref 12.4–15.4)
PLATELET # BLD: 153 K/UL (ref 135–450)
PMV BLD AUTO: 9.4 FL (ref 5–10.5)
RBC # BLD: 4.27 M/UL (ref 4.2–5.9)
REASON FOR REJECTION: NORMAL
REJECTED TEST: NORMAL
WBC # BLD: 7.3 K/UL (ref 4–11)

## 2020-06-22 PROCEDURE — 99214 OFFICE O/P EST MOD 30 MIN: CPT | Performed by: FAMILY MEDICINE

## 2020-06-22 RX ORDER — VITAMIN E 268 MG
800 CAPSULE ORAL DAILY
Status: ON HOLD | COMMUNITY
End: 2022-06-26 | Stop reason: HOSPADM

## 2020-06-22 RX ORDER — ALPRAZOLAM 0.25 MG/1
0.25 TABLET ORAL DAILY PRN
Qty: 30 TABLET | Refills: 0 | Status: SHIPPED | OUTPATIENT
Start: 2020-06-22 | End: 2020-07-22

## 2020-06-22 RX ORDER — FUROSEMIDE 40 MG/1
TABLET ORAL
Qty: 90 TABLET | Refills: 1 | Status: SHIPPED | OUTPATIENT
Start: 2020-06-22 | End: 2020-09-21 | Stop reason: SDUPTHER

## 2020-06-22 ASSESSMENT — ENCOUNTER SYMPTOMS
EYE PAIN: 0
CHEST TIGHTNESS: 0
NAUSEA: 0
SHORTNESS OF BREATH: 0
VOMITING: 0
DIARRHEA: 0
TROUBLE SWALLOWING: 0
ABDOMINAL PAIN: 0
BLOOD IN STOOL: 0
WHEEZING: 0
ABDOMINAL DISTENTION: 1

## 2020-06-22 NOTE — PROGRESS NOTES
HISTORY  Past Medical History:   Diagnosis Date    GERD (gastroesophageal reflux disease) 2000    Hypertension        FAMILY HISTORY  Family History   Problem Relation Age of Onset    Hypertension Brother     Diabetes Other        SOCIAL HISTORY  Social History     Socioeconomic History    Marital status:      Spouse name: None    Number of children: None    Years of education: None    Highest education level: None   Occupational History     Comment: Retired    Social Needs    Financial resource strain: None    Food insecurity     Worry: None     Inability: None    Transportation needs     Medical: None     Non-medical: None   Tobacco Use    Smoking status: Never Smoker    Smokeless tobacco: Never Used   Substance and Sexual Activity    Alcohol use: Yes     Comment: drink 1/3 liter of whiskey everyday    Drug use: Never    Sexual activity: Yes     Partners: Female   Lifestyle    Physical activity     Days per week: None     Minutes per session: None    Stress: None   Relationships    Social connections     Talks on phone: None     Gets together: None     Attends Baptist service: None     Active member of club or organization: None     Attends meetings of clubs or organizations: None     Relationship status: None    Intimate partner violence     Fear of current or ex partner: None     Emotionally abused: None     Physically abused: None     Forced sexual activity: None   Other Topics Concern    None   Social History Narrative    None        SURGICAL HISTORY  Past Surgical History:   Procedure Laterality Date    CHOLECYSTECTOMY      VASECTOMY                   CURRENT MEDICATIONS  Current Outpatient Medications   Medication Sig Dispense Refill    NONFORMULARY 1 capsule LiverMD      NONFORMULARY 1 capsule MIND tech      NONFORMULARY 1 capsule SizeGenix      vitamin E 400 UNIT capsule Take 800 Units by mouth daily      furosemide (LASIX) 40 MG tablet TAKE 1 TABLET BY MOUTH ONCE DAILY 90 tablet 1    ALPRAZolam (XANAX) 0.25 MG tablet Take 1 tablet by mouth daily as needed for Anxiety for up to 30 days. 30 tablet 0    spironolactone (ALDACTONE) 50 MG tablet Take 1 tablet by mouth daily 90 tablet 1    nadolol (CORGARD) 40 MG tablet Take 1 tablet by mouth daily 90 tablet 1    DULoxetine (CYMBALTA) 60 MG extended release capsule TAKE 1 CAPSULE BY MOUTH ONCE DAILY 90 capsule 1    magnesium oxide (MAG-OX) 400 MG tablet TAKE 1 2 (ONE HALF) TABLET BY MOUTH TWICE DAILY  1    Multiple Vitamin (MULTI VITAMIN MENS PO) Take 1 tablet by mouth daily      esomeprazole Magnesium (NEXIUM) 20 MG PACK Take 20 mg by mouth daily      Omega-3 1000 MG CAPS Take 1 capsule by mouth daily      levothyroxine (SYNTHROID) 25 MCG tablet Take 1 tablet by mouth Daily (Patient not taking: Reported on 6/22/2020) 90 tablet 1    Lactulose (CONSTULOSE PO) Take by mouth      tiZANidine (ZANAFLEX) 2 MG tablet Take 1 tablet by mouth every 8 hours as needed (muscle spasms) (Patient not taking: Reported on 6/22/2020) 30 tablet 0     No current facility-administered medications for this visit. ALLERGIES  Allergies   Allergen Reactions    Lisinopril Swelling     Tongue swelling      Zetia [Ezetimibe] Swelling     Facial swelling    Atorvastatin     Codeine Other (See Comments)     Blood pressure drops    Hydrochlorothiazide     Hydroxyzine      Fatigue and depressed    Lexapro [Escitalopram Oxalate]      Increased depression    Pravastatin        PHYSICAL EXAM    BP (!) 148/76 (Site: Right Upper Arm, Position: Sitting, Cuff Size: Large Adult)   Pulse 63   Temp 98 °F (36.7 °C)   Ht 5' 7\" (1.702 m)   Wt 287 lb (130.2 kg)   SpO2 97%   BMI 44.95 kg/m²     Physical Exam  Vitals signs and nursing note reviewed. Constitutional:       Appearance: He is well-developed. He is obese. He is not ill-appearing. HENT:      Head: Normocephalic and atraumatic. Nose: Nose normal. No congestion.

## 2020-06-23 ENCOUNTER — TELEPHONE (OUTPATIENT)
Dept: FAMILY MEDICINE CLINIC | Age: 75
End: 2020-06-23

## 2020-06-23 LAB
A/G RATIO: 1 (ref 1.1–2.2)
ALBUMIN SERPL-MCNC: 3.8 G/DL (ref 3.4–5)
ALP BLD-CCNC: 122 U/L (ref 40–129)
ALT SERPL-CCNC: 22 U/L (ref 10–40)
ANION GAP SERPL CALCULATED.3IONS-SCNC: 16 MMOL/L (ref 3–16)
AST SERPL-CCNC: 37 U/L (ref 15–37)
BILIRUB SERPL-MCNC: 0.6 MG/DL (ref 0–1)
BUN BLDV-MCNC: 14 MG/DL (ref 7–20)
CALCIUM SERPL-MCNC: 9.5 MG/DL (ref 8.3–10.6)
CHLORIDE BLD-SCNC: 99 MMOL/L (ref 99–110)
CO2: 24 MMOL/L (ref 21–32)
CREAT SERPL-MCNC: 0.8 MG/DL (ref 0.8–1.3)
ESTIMATED AVERAGE GLUCOSE: 128.4 MG/DL
GFR AFRICAN AMERICAN: >60
GFR NON-AFRICAN AMERICAN: >60
GLOBULIN: 3.7 G/DL
GLUCOSE BLD-MCNC: 109 MG/DL (ref 70–99)
HBA1C MFR BLD: 6.1 %
POTASSIUM SERPL-SCNC: 4.3 MMOL/L (ref 3.5–5.1)
SODIUM BLD-SCNC: 139 MMOL/L (ref 136–145)
T4 FREE: 0.7 NG/DL (ref 0.9–1.8)
TOTAL PROTEIN: 7.5 G/DL (ref 6.4–8.2)
TSH REFLEX: 1.77 UIU/ML (ref 0.27–4.2)

## 2020-06-25 ENCOUNTER — HOSPITAL ENCOUNTER (OUTPATIENT)
Age: 75
Setting detail: SPECIMEN
Discharge: HOME | End: 2020-06-25
Payer: COMMERCIAL

## 2020-06-25 DIAGNOSIS — K70.30 ALCOHOLIC CIRRHOSIS, UNSPECIFIED WHETHER ASCITES PRESENT (HCC): ICD-10-CM

## 2020-06-25 LAB — AMMONIA: 128 UMOL/L (ref 16–60)

## 2020-06-25 PROCEDURE — 82140 ASSAY OF AMMONIA: CPT

## 2020-09-21 ENCOUNTER — OFFICE VISIT (OUTPATIENT)
Dept: FAMILY MEDICINE CLINIC | Age: 75
End: 2020-09-21
Payer: COMMERCIAL

## 2020-09-21 ENCOUNTER — HOSPITAL ENCOUNTER (OUTPATIENT)
Age: 75
Setting detail: SPECIMEN
Discharge: HOME | End: 2020-09-21
Payer: COMMERCIAL

## 2020-09-21 VITALS
SYSTOLIC BLOOD PRESSURE: 138 MMHG | WEIGHT: 285 LBS | HEIGHT: 67 IN | OXYGEN SATURATION: 97 % | BODY MASS INDEX: 44.73 KG/M2 | HEART RATE: 63 BPM | TEMPERATURE: 97.9 F | DIASTOLIC BLOOD PRESSURE: 82 MMHG

## 2020-09-21 DIAGNOSIS — E03.9 ACQUIRED HYPOTHYROIDISM: ICD-10-CM

## 2020-09-21 DIAGNOSIS — K70.31 ALCOHOLIC CIRRHOSIS OF LIVER WITH ASCITES (HCC): ICD-10-CM

## 2020-09-21 PROBLEM — E66.01 CLASS 3 SEVERE OBESITY WITH BODY MASS INDEX (BMI) OF 40.0 TO 44.9 IN ADULT (HCC): Status: ACTIVE | Noted: 2020-09-21

## 2020-09-21 PROBLEM — E66.813 CLASS 3 SEVERE OBESITY WITH BODY MASS INDEX (BMI) OF 40.0 TO 44.9 IN ADULT: Status: ACTIVE | Noted: 2020-09-21

## 2020-09-21 LAB
AMMONIA: 96 UMOL/L (ref 16–60)
TSH REFLEX: 2.33 UIU/ML (ref 0.27–4.2)

## 2020-09-21 PROCEDURE — 99214 OFFICE O/P EST MOD 30 MIN: CPT | Performed by: FAMILY MEDICINE

## 2020-09-21 PROCEDURE — 82140 ASSAY OF AMMONIA: CPT

## 2020-09-21 RX ORDER — NADOLOL 40 MG/1
40 TABLET ORAL DAILY
Qty: 90 TABLET | Refills: 1 | Status: SHIPPED | OUTPATIENT
Start: 2020-09-21 | End: 2021-05-07 | Stop reason: SDUPTHER

## 2020-09-21 RX ORDER — ALPRAZOLAM 0.25 MG/1
0.25 TABLET ORAL DAILY PRN
Qty: 30 TABLET | Refills: 0 | Status: SHIPPED | OUTPATIENT
Start: 2020-09-21 | End: 2020-10-21

## 2020-09-21 RX ORDER — FUROSEMIDE 40 MG/1
TABLET ORAL
Qty: 90 TABLET | Refills: 1 | Status: SHIPPED | OUTPATIENT
Start: 2020-09-21 | End: 2021-05-07 | Stop reason: SDUPTHER

## 2020-09-21 RX ORDER — SPIRONOLACTONE 50 MG/1
50 TABLET, FILM COATED ORAL DAILY
Qty: 90 TABLET | Refills: 1 | Status: SHIPPED | OUTPATIENT
Start: 2020-09-21 | End: 2021-04-27 | Stop reason: SDUPTHER

## 2020-09-21 RX ORDER — DULOXETIN HYDROCHLORIDE 60 MG/1
CAPSULE, DELAYED RELEASE ORAL
Qty: 90 CAPSULE | Refills: 1 | Status: SHIPPED | OUTPATIENT
Start: 2020-09-21 | End: 2021-04-05 | Stop reason: SDUPTHER

## 2020-09-21 RX ORDER — ALPRAZOLAM 0.25 MG/1
0.25 TABLET ORAL DAILY PRN
COMMUNITY
End: 2020-09-21 | Stop reason: SDUPTHER

## 2020-09-21 ASSESSMENT — ENCOUNTER SYMPTOMS
EYE PAIN: 0
VOMITING: 0
WHEEZING: 0
DIARRHEA: 0
BLOOD IN STOOL: 0
CHEST TIGHTNESS: 0
ABDOMINAL PAIN: 0
TROUBLE SWALLOWING: 0
SHORTNESS OF BREATH: 0
NAUSEA: 0

## 2020-09-21 NOTE — PROGRESS NOTES
9/21/2020    [facility]    Chief Complaint   Patient presents with    3 Month Follow-Up    Discuss Labs       HPI  History was obtained from the patient. Bina Null is a 69 y.o. male who presents today with follow-up on alcoholism and history of cirrhosis. Also we discussed his anxiety, increased weight, hypothyroidism, and hypertension. Mood seems to be reasonable he is trying to stay active just did not have as much energy as he used to. On review he does need a follow-up on free T4 /TSH and ammonia level. His thyroid dose was titrated 3 months ago. Ammonia level on previous testing was elevated he has started something over-the-counter. He had previously stopped his lactulose because of increased diarrhea. He is not describing Increased confusion etc. at this point. He does not drink any hard liquor at this time -only beer. REVIEW OF SYMPTOMS    Review of Systems   Constitutional: Negative for activity change and fatigue. HENT: Negative for congestion, hearing loss, mouth sores and trouble swallowing. Eyes: Negative for pain and visual disturbance. Respiratory: Negative for chest tightness, shortness of breath and wheezing. Cardiovascular: Negative for chest pain and palpitations. Gastrointestinal: Negative for abdominal pain, blood in stool, diarrhea, nausea and vomiting. Endocrine: Negative for polydipsia. Genitourinary: Negative for dysuria, frequency and urgency. Musculoskeletal: Negative for arthralgias, gait problem and neck stiffness. Skin: Negative for rash. Allergic/Immunologic: Negative for environmental allergies. Neurological: Negative for dizziness, seizures, speech difficulty and weakness. Hematological: Does not bruise/bleed easily. Psychiatric/Behavioral: Negative for agitation, confusion and hallucinations.        PAST MEDICAL HISTORY  Past Medical History:   Diagnosis Date    GERD (gastroesophageal reflux disease) 2000    Hypertension        FAMILY HISTORY  Family History   Problem Relation Age of Onset    Hypertension Brother     Diabetes Other        SOCIAL HISTORY  Social History     Socioeconomic History    Marital status:      Spouse name: None    Number of children: None    Years of education: None    Highest education level: None   Occupational History     Comment: Retired    Social Needs    Financial resource strain: None    Food insecurity     Worry: None     Inability: None    Transportation needs     Medical: None     Non-medical: None   Tobacco Use    Smoking status: Never Smoker    Smokeless tobacco: Never Used   Substance and Sexual Activity    Alcohol use: Yes     Comment: drink 1/3 liter of whiskey everyday    Drug use: Never    Sexual activity: Yes     Partners: Female   Lifestyle    Physical activity     Days per week: None     Minutes per session: None    Stress: None   Relationships    Social connections     Talks on phone: None     Gets together: None     Attends Bahai service: None     Active member of club or organization: None     Attends meetings of clubs or organizations: None     Relationship status: None    Intimate partner violence     Fear of current or ex partner: None     Emotionally abused: None     Physically abused: None     Forced sexual activity: None   Other Topics Concern    None   Social History Narrative    None        SURGICAL HISTORY  Past Surgical History:   Procedure Laterality Date    CHOLECYSTECTOMY      VASECTOMY                   CURRENT MEDICATIONS  Current Outpatient Medications   Medication Sig Dispense Refill    furosemide (LASIX) 40 MG tablet TAKE 1 TABLET BY MOUTH ONCE DAILY 90 tablet 1    ALPRAZolam (XANAX) 0.25 MG tablet Take 1 tablet by mouth daily as needed for Sleep for up to 30 days.  30 tablet 0    DULoxetine (CYMBALTA) 60 MG extended release capsule TAKE 1 CAPSULE BY MOUTH ONCE DAILY 90 capsule 1    spironolactone (ALDACTONE) 50 MG tablet Take 1 tablet by mouth daily 90 tablet 1    nadolol (CORGARD) 40 MG tablet Take 1 tablet by mouth daily 90 tablet 1    NONFORMULARY 1 capsule LiverMD      NONFORMULARY 1 capsule MIND tech      NONFORMULARY 1 capsule SizeGenix      vitamin E 400 UNIT capsule Take 800 Units by mouth daily      Multiple Vitamin (MULTI VITAMIN MENS PO) Take 1 tablet by mouth daily      esomeprazole Magnesium (NEXIUM) 20 MG PACK Take 20 mg by mouth daily      Omega-3 1000 MG CAPS Take 1 capsule by mouth daily      levothyroxine (SYNTHROID) 25 MCG tablet Take 1 tablet by mouth Daily (Patient not taking: Reported on 6/22/2020) 90 tablet 1    magnesium oxide (MAG-OX) 400 MG tablet TAKE 1 2 (ONE HALF) TABLET BY MOUTH TWICE DAILY  1    Lactulose (CONSTULOSE PO) Take by mouth      tiZANidine (ZANAFLEX) 2 MG tablet Take 1 tablet by mouth every 8 hours as needed (muscle spasms) (Patient not taking: Reported on 6/22/2020) 30 tablet 0     No current facility-administered medications for this visit. ALLERGIES  Allergies   Allergen Reactions    Lisinopril Swelling     Tongue swelling      Zetia [Ezetimibe] Swelling     Facial swelling    Atorvastatin     Codeine Other (See Comments)     Blood pressure drops    Hydrochlorothiazide     Hydroxyzine      Fatigue and depressed    Lexapro [Escitalopram Oxalate]      Increased depression    Pravastatin        PHYSICAL EXAM    /82 (Site: Right Upper Arm, Position: Sitting, Cuff Size: Large Adult)   Pulse 63   Temp 97.9 °F (36.6 °C)   Ht 5' 7\" (1.702 m)   Wt 285 lb (129.3 kg)   SpO2 97%   BMI 44.64 kg/m²     Physical Exam  Vitals signs and nursing note reviewed. Constitutional:       Appearance: He is well-developed. He is obese. He is not ill-appearing. HENT:      Head: Normocephalic and atraumatic. Eyes:      Pupils: Pupils are equal, round, and reactive to light. Neck:      Musculoskeletal: Normal range of motion and neck supple.    Cardiovascular:      Rate and Rhythm: Normal rate and regular rhythm. Heart sounds: Normal heart sounds. Pulmonary:      Effort: Pulmonary effort is normal.      Breath sounds: Normal breath sounds. Abdominal:      Palpations: Abdomen is soft. Musculoskeletal: Normal range of motion. Skin:     General: Skin is warm and dry. Neurological:      General: No focal deficit present. Mental Status: He is alert and oriented to person, place, and time. Mental status is at baseline. Cranial Nerves: No cranial nerve deficit. Sensory: No sensory deficit. Psychiatric:         Mood and Affect: Mood normal.         Behavior: Behavior normal.         Thought Content: Thought content normal.         ASSESSMENT & PLAN     Diagnosis Orders   1. Anxiety  ALPRAZolam (XANAX) 0.25 MG tablet   2. Alcoholic cirrhosis of liver with ascites (HCC)  furosemide (LASIX) 40 MG tablet   3. Essential hypertension     4. Class 3 severe obesity due to excess calories without serious comorbidity with body mass index (BMI) of 40.0 to 44.9 in adult ([facility] [address]     5. Acquired hypothyroidism  T4, FREE    TSH with Reflex   This point advised to continue to work on exercise and mild weight loss. Avoid all alcohol as much as possible. We will recheck a free T4 TSH and ammonia level today in mid-October advised that he get a Pneumovax 23 and a high-dose flu shot. He is to call with questions or problems in the meantime encouragement for healthy lifestyle and social distancing provided. Return in about 3 months (around 12/21/2020).          Electronically signed by Marshall Sotomayor MD on 9/21/2020

## 2020-09-22 LAB — T4 FREE: 0.9 NG/DL (ref 0.9–1.8)

## 2020-09-25 ENCOUNTER — HOSPITAL ENCOUNTER (EMERGENCY)
Age: 75
Discharge: HOME | End: 2020-09-25
Payer: COMMERCIAL

## 2020-09-25 ENCOUNTER — APPOINTMENT (OUTPATIENT)
Dept: GENERAL RADIOLOGY | Age: 75
End: 2020-09-25
Payer: COMMERCIAL

## 2020-09-25 VITALS
HEART RATE: 62 BPM | DIASTOLIC BLOOD PRESSURE: 74 MMHG | TEMPERATURE: 98.4 F | WEIGHT: 285 LBS | OXYGEN SATURATION: 96 % | HEIGHT: 68 IN | BODY MASS INDEX: 43.19 KG/M2 | SYSTOLIC BLOOD PRESSURE: 166 MMHG | RESPIRATION RATE: 16 BRPM

## 2020-09-25 PROCEDURE — 73630 X-RAY EXAM OF FOOT: CPT

## 2020-09-25 PROCEDURE — 99283 EMERGENCY DEPT VISIT LOW MDM: CPT

## 2020-09-25 RX ORDER — DOXYCYCLINE HYCLATE 100 MG
100 TABLET ORAL 2 TIMES DAILY
Qty: 14 TABLET | Refills: 0 | Status: SHIPPED | OUTPATIENT
Start: 2020-09-25 | End: 2020-10-02

## 2020-09-25 ASSESSMENT — PAIN SCALES - GENERAL: PAINLEVEL_OUTOF10: 7

## 2020-09-25 NOTE — ED PROVIDER NOTES
denies being on any medication for this currently. Patient declines pain medication while in emergency department. Left foot x-ray shows 18 mm broken needle projecting in the soft tissues overlying the plantar aspect of the fourth metatarsal with no acute osseous abnormality. Due to depth of the foreign body will not attempt removal in emergency department. Consult made to general surgeon Dr. Delon Gonsalez who agrees with plan and outpatient follow-up. Wound cleaned and bandaged. Patient provided postop shoe and crutches. Recommend over-the-counter Tylenol as needed for pain. Patient will be started on doxycycline prophylactically. I discussed signs infection return immediately if these develop. Recommend follow-up with general surgeon by calling to schedule appointment for evaluation. Clinical  IMPRESSION    1. Retained foreign body        Wound care instructions discussed with patient today. Diagnosis and plan discussed in detail with patient who understands and agrees. Return to emergency Department precautions were discussed in detail with patient who understands and agrees. Comment: Please note this report has been produced using speech recognition software and may contain errors related to that system including errors in grammar, punctuation, and spelling, as well as words and phrases that may be inappropriate. If there are any questions or concerns please feel free to contact the dictating provider for clarification.           Paulina Monaco PA-C  09/25/20 [id number]

## 2020-10-19 ENCOUNTER — TELEPHONE (OUTPATIENT)
Dept: FAMILY MEDICINE CLINIC | Age: 75
End: 2020-10-19

## 2020-10-19 RX ORDER — METHYLPREDNISOLONE 4 MG/1
TABLET ORAL
Qty: 1 KIT | Refills: 0 | Status: SHIPPED | OUTPATIENT
Start: 2020-10-19 | End: 2020-10-25

## 2020-10-19 NOTE — TELEPHONE ENCOUNTER
Okay to send in a Medrol Dosepak #1/NR  sig: as directed with food. Have him limit movements that aggravate the pain, and apply ice.   He will need follow-up visit for x-ray of right knee, possible uric acid level, etc.

## 2020-10-21 ENCOUNTER — OFFICE VISIT (OUTPATIENT)
Dept: FAMILY MEDICINE CLINIC | Age: 75
End: 2020-10-21
Payer: COMMERCIAL

## 2020-10-21 VITALS
TEMPERATURE: 98.4 F | HEIGHT: 68 IN | SYSTOLIC BLOOD PRESSURE: 130 MMHG | HEART RATE: 69 BPM | WEIGHT: 283 LBS | OXYGEN SATURATION: 97 % | DIASTOLIC BLOOD PRESSURE: 80 MMHG | BODY MASS INDEX: 42.89 KG/M2

## 2020-10-21 DIAGNOSIS — R60.9 EDEMA, UNSPECIFIED TYPE: ICD-10-CM

## 2020-10-21 DIAGNOSIS — M25.561 RECURRENT PAIN OF RIGHT KNEE: ICD-10-CM

## 2020-10-21 LAB
A/G RATIO: 1.1 (ref 1.1–2.2)
ALBUMIN SERPL-MCNC: 3.9 G/DL (ref 3.4–5)
ALP BLD-CCNC: 137 U/L (ref 40–129)
ALT SERPL-CCNC: 29 U/L (ref 10–40)
ANION GAP SERPL CALCULATED.3IONS-SCNC: 11 MMOL/L (ref 3–16)
AST SERPL-CCNC: 41 U/L (ref 15–37)
BILIRUB SERPL-MCNC: 0.5 MG/DL (ref 0–1)
BUN BLDV-MCNC: 17 MG/DL (ref 7–20)
CALCIUM SERPL-MCNC: 10.2 MG/DL (ref 8.3–10.6)
CHLORIDE BLD-SCNC: 101 MMOL/L (ref 99–110)
CO2: 28 MMOL/L (ref 21–32)
CREAT SERPL-MCNC: 0.7 MG/DL (ref 0.8–1.3)
GFR AFRICAN AMERICAN: >60
GFR NON-AFRICAN AMERICAN: >60
GLOBULIN: 3.4 G/DL
GLUCOSE BLD-MCNC: 153 MG/DL (ref 70–99)
POTASSIUM SERPL-SCNC: 5.1 MMOL/L (ref 3.5–5.1)
SEDIMENTATION RATE, ERYTHROCYTE: 44 MM/HR (ref 0–20)
SODIUM BLD-SCNC: 140 MMOL/L (ref 136–145)
TOTAL PROTEIN: 7.3 G/DL (ref 6.4–8.2)
URIC ACID, SERUM: 6.5 MG/DL (ref 3.5–7.2)

## 2020-10-21 PROCEDURE — 90471 IMMUNIZATION ADMIN: CPT | Performed by: FAMILY MEDICINE

## 2020-10-21 PROCEDURE — 90694 VACC AIIV4 NO PRSRV 0.5ML IM: CPT | Performed by: FAMILY MEDICINE

## 2020-10-21 PROCEDURE — 99213 OFFICE O/P EST LOW 20 MIN: CPT | Performed by: FAMILY MEDICINE

## 2020-10-21 PROCEDURE — 90732 PPSV23 VACC 2 YRS+ SUBQ/IM: CPT | Performed by: FAMILY MEDICINE

## 2020-10-21 PROCEDURE — 90472 IMMUNIZATION ADMIN EACH ADD: CPT | Performed by: FAMILY MEDICINE

## 2020-10-21 RX ORDER — PREDNISONE 10 MG/1
10 TABLET ORAL 2 TIMES DAILY
Qty: 10 TABLET | Refills: 0 | Status: SHIPPED | OUTPATIENT
Start: 2020-10-21 | End: 2020-10-26

## 2020-10-21 ASSESSMENT — ENCOUNTER SYMPTOMS
NAUSEA: 0
TROUBLE SWALLOWING: 0
CHEST TIGHTNESS: 0
EYE PAIN: 0
WHEEZING: 0
BLOOD IN STOOL: 0
DIARRHEA: 0
SHORTNESS OF BREATH: 0
ABDOMINAL PAIN: 0
VOMITING: 0

## 2020-10-21 NOTE — PROGRESS NOTES
10/21/2020    Ori Mondragon    Chief Complaint   Patient presents with    Knee Pain     right knee pain.  knee gave out and fell. x3 weeks ago. did steroid pack, helped some still taking. HPI  History was obtained from the patient. Landy Hannah is a 69 y.o. male who presents today with follow-up on right knee pain and swelling. Patient stated been having pain and swelling his right knee and then about 3 weeks ago it gave out he fell while he is walking. Pain got slightly worse as did swelling no past history of acute injury. Treated with ice limited weightbearing and a Medrol Dosepak still painful but certainly better. 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 Most pain is medial.  Denies other injury before or after this event. Laurence Jacobo REVIEW OF SYMPTOMS    Review of Systems   Constitutional: Negative for activity change and fatigue. HENT: Negative for congestion, hearing loss, mouth sores and trouble swallowing. Eyes: Negative for pain and visual disturbance. Respiratory: Negative for chest tightness, shortness of breath and wheezing. Cardiovascular: Negative for chest pain and palpitations. Gastrointestinal: Negative for abdominal pain, blood in stool, diarrhea, nausea and vomiting. Endocrine: Negative for polydipsia and polyuria. Genitourinary: Negative for dysuria, frequency and urgency. Musculoskeletal: Positive for arthralgias. Negative for gait problem and neck stiffness. Patient complaint of right knee pain and swelling. Has given out on him 1 time 3 weeks ago and he fell. Skin: Negative for rash. Allergic/Immunologic: Negative for environmental allergies. Neurological: Negative for dizziness, seizures, speech difficulty and weakness. Hematological: Does not bruise/bleed easily. Psychiatric/Behavioral: Negative for agitation, confusion and hallucinations.        PAST MEDICAL HISTORY  Past Medical History: 90 tablet 1    ALPRAZolam (XANAX) 0.25 MG tablet Take 1 tablet by mouth daily as needed for Sleep for up to 30 days. 30 tablet 0    DULoxetine (CYMBALTA) 60 MG extended release capsule TAKE 1 CAPSULE BY MOUTH ONCE DAILY 90 capsule 1    spironolactone (ALDACTONE) 50 MG tablet Take 1 tablet by mouth daily 90 tablet 1    nadolol (CORGARD) 40 MG tablet Take 1 tablet by mouth daily 90 tablet 1    NONFORMULARY 1 capsule LiverMD      NONFORMULARY 1 capsule MIND tech      NONFORMULARY 1 capsule SizeGenix      vitamin E 400 UNIT capsule Take 800 Units by mouth daily      levothyroxine (SYNTHROID) 25 MCG tablet Take 1 tablet by mouth Daily (Patient not taking: Reported on 6/22/2020) 90 tablet 1    magnesium oxide (MAG-OX) 400 MG tablet TAKE 1 2 (ONE HALF) TABLET BY MOUTH TWICE DAILY  1    Lactulose (CONSTULOSE PO) Take by mouth      tiZANidine (ZANAFLEX) 2 MG tablet Take 1 tablet by mouth every 8 hours as needed (muscle spasms) (Patient not taking: Reported on 6/22/2020) 30 tablet 0    Multiple Vitamin (MULTI VITAMIN MENS PO) Take 1 tablet by mouth daily      esomeprazole Magnesium (NEXIUM) 20 MG PACK Take 20 mg by mouth daily      Omega-3 1000 MG CAPS Take 1 capsule by mouth daily       No current facility-administered medications for this visit. ALLERGIES  Allergies   Allergen Reactions    Lisinopril Swelling     Tongue swelling      Zetia [Ezetimibe] Swelling     Facial swelling    Atorvastatin     Codeine Other (See Comments)     Blood pressure drops    Hydrochlorothiazide     Hydroxyzine      Fatigue and depressed    Lexapro [Escitalopram Oxalate]      Increased depression    Pravastatin        PHYSICAL EXAM    /80 (Site: Right Upper Arm, Position: Sitting, Cuff Size: Medium Adult)   Pulse 69   Temp 98.4 °F (36.9 °C)   Ht 5' 8\" (1.727 m)   Wt 283 lb (128.4 kg)   SpO2 97%   BMI 43.03 kg/m²     Physical Exam  Vitals signs and nursing note reviewed.    Constitutional: Appearance: He is well-developed. He is obese. He is not ill-appearing. HENT:      Head: Normocephalic and atraumatic. Nose: Nose normal. No congestion. Eyes:      Pupils: Pupils are equal, round, and reactive to light. Neck:      Musculoskeletal: Normal range of motion and neck supple. Cardiovascular:      Rate and Rhythm: Normal rate and regular rhythm. Heart sounds: Normal heart sounds. Pulmonary:      Effort: Pulmonary effort is normal.      Breath sounds: Normal breath sounds. Abdominal:      Palpations: Abdomen is soft. Musculoskeletal:      Comments: Patient with complaints of pain right knee tender joint line medially more than laterally. Slight effusion, no joint laxity seen no current crepitation no pain around the patellar margins and no pain behind the knee. Skin:     General: Skin is warm and dry. Neurological:      Mental Status: He is alert and oriented to person, place, and time. Psychiatric:         Thought Content: Thought content normal.         ASSESSMENT & PLAN     Diagnosis Orders   1. Recurrent pain of right knee  XR KNEE RIGHT (3 VIEWS)    SEDIMENTATION RATE    Uric Acid   2. Edema, unspecified type  COMPREHENSIVE METABOLIC PANEL   This time continue with ice and elevation will x-ray right knee will check uric acid and sed rate. Extend prednisone 10 mg twice daily for 5 days and then will decide if still symptomatic whether or not he needs to see Ortho. He is he did get a high dose flu shot and Pneumovax 23 today. Return in about 3 months (around 1/21/2021), or if symptoms worsen or fail to improve.          Electronically signed by Steve Ma MD on 10/21/2020

## 2020-10-28 ENCOUNTER — HOSPITAL ENCOUNTER (OUTPATIENT)
Age: 75
Discharge: HOME | End: 2020-10-28
Payer: COMMERCIAL

## 2020-10-28 ENCOUNTER — HOSPITAL ENCOUNTER (OUTPATIENT)
Dept: GENERAL RADIOLOGY | Age: 75
Discharge: HOME | End: 2020-10-28
Payer: COMMERCIAL

## 2020-10-28 PROCEDURE — 73562 X-RAY EXAM OF KNEE 3: CPT

## 2021-01-05 ENCOUNTER — TELEPHONE (OUTPATIENT)
Dept: FAMILY MEDICINE CLINIC | Age: 76
End: 2021-01-05

## 2021-04-05 RX ORDER — DULOXETIN HYDROCHLORIDE 60 MG/1
CAPSULE, DELAYED RELEASE ORAL
Qty: 90 CAPSULE | Refills: 1 | Status: SHIPPED | OUTPATIENT
Start: 2021-04-05 | End: 2021-05-07 | Stop reason: SDUPTHER

## 2021-04-27 RX ORDER — SPIRONOLACTONE 50 MG/1
50 TABLET, FILM COATED ORAL DAILY
Qty: 90 TABLET | Refills: 0 | Status: SHIPPED | OUTPATIENT
Start: 2021-04-27 | End: 2021-05-07 | Stop reason: SDUPTHER

## 2021-05-07 ENCOUNTER — OFFICE VISIT (OUTPATIENT)
Dept: FAMILY MEDICINE CLINIC | Age: 76
End: 2021-05-07
Payer: COMMERCIAL

## 2021-05-07 VITALS
BODY MASS INDEX: 47.13 KG/M2 | WEIGHT: 311 LBS | HEIGHT: 68 IN | OXYGEN SATURATION: 97 % | DIASTOLIC BLOOD PRESSURE: 80 MMHG | SYSTOLIC BLOOD PRESSURE: 132 MMHG | HEART RATE: 72 BPM

## 2021-05-07 DIAGNOSIS — M62.838 TRAPEZIUS MUSCLE SPASM: ICD-10-CM

## 2021-05-07 DIAGNOSIS — H91.93 BILATERAL HEARING LOSS, UNSPECIFIED HEARING LOSS TYPE: ICD-10-CM

## 2021-05-07 DIAGNOSIS — M54.2 ACUTE NECK PAIN: ICD-10-CM

## 2021-05-07 DIAGNOSIS — E03.9 ACQUIRED HYPOTHYROIDISM: ICD-10-CM

## 2021-05-07 DIAGNOSIS — R73.9 HYPERGLYCEMIA: ICD-10-CM

## 2021-05-07 DIAGNOSIS — R53.83 FATIGUE, UNSPECIFIED TYPE: ICD-10-CM

## 2021-05-07 DIAGNOSIS — Z78.9 ALCOHOL USE: ICD-10-CM

## 2021-05-07 DIAGNOSIS — R79.89 INCREASED AMMONIA LEVEL: ICD-10-CM

## 2021-05-07 DIAGNOSIS — E66.01 CLASS 3 SEVERE OBESITY DUE TO EXCESS CALORIES WITHOUT SERIOUS COMORBIDITY WITH BODY MASS INDEX (BMI) OF 40.0 TO 44.9 IN ADULT (HCC): ICD-10-CM

## 2021-05-07 DIAGNOSIS — K21.9 GASTROESOPHAGEAL REFLUX DISEASE WITHOUT ESOPHAGITIS: ICD-10-CM

## 2021-05-07 DIAGNOSIS — K70.31 ALCOHOLIC CIRRHOSIS OF LIVER WITH ASCITES (HCC): ICD-10-CM

## 2021-05-07 DIAGNOSIS — I10 ESSENTIAL HYPERTENSION: Primary | ICD-10-CM

## 2021-05-07 PROCEDURE — 99214 OFFICE O/P EST MOD 30 MIN: CPT | Performed by: FAMILY MEDICINE

## 2021-05-07 RX ORDER — DULOXETIN HYDROCHLORIDE 60 MG/1
CAPSULE, DELAYED RELEASE ORAL
Qty: 90 CAPSULE | Refills: 1 | Status: SHIPPED | OUTPATIENT
Start: 2021-05-07 | End: 2022-03-11 | Stop reason: SDUPTHER

## 2021-05-07 RX ORDER — FUROSEMIDE 40 MG/1
TABLET ORAL
Qty: 90 TABLET | Refills: 1 | Status: SHIPPED | OUTPATIENT
Start: 2021-05-07 | End: 2021-08-30

## 2021-05-07 RX ORDER — LEVOTHYROXINE SODIUM 0.03 MG/1
25 TABLET ORAL DAILY
Qty: 90 TABLET | Refills: 1 | Status: SHIPPED | OUTPATIENT
Start: 2021-05-07 | End: 2021-11-18 | Stop reason: SDUPTHER

## 2021-05-07 RX ORDER — NADOLOL 40 MG/1
40 TABLET ORAL DAILY
Qty: 90 TABLET | Refills: 1 | Status: SHIPPED | OUTPATIENT
Start: 2021-05-07 | End: 2021-11-18 | Stop reason: SDUPTHER

## 2021-05-07 RX ORDER — SPIRONOLACTONE 50 MG/1
50 TABLET, FILM COATED ORAL DAILY
Qty: 90 TABLET | Refills: 1 | Status: SHIPPED | OUTPATIENT
Start: 2021-05-07 | End: 2021-08-30 | Stop reason: SDUPTHER

## 2021-05-07 RX ORDER — TIZANIDINE 2 MG/1
2 TABLET ORAL EVERY 8 HOURS PRN
Qty: 30 TABLET | Refills: 0 | Status: SHIPPED | OUTPATIENT
Start: 2021-05-07 | End: 2021-09-13

## 2021-05-07 ASSESSMENT — ENCOUNTER SYMPTOMS
VOMITING: 0
NAUSEA: 0
CHEST TIGHTNESS: 0
WHEEZING: 0
SHORTNESS OF BREATH: 0
BLOOD IN STOOL: 0
EYE PAIN: 0
TROUBLE SWALLOWING: 0
ABDOMINAL PAIN: 0
DIARRHEA: 0

## 2021-05-07 ASSESSMENT — PATIENT HEALTH QUESTIONNAIRE - PHQ9
2. FEELING DOWN, DEPRESSED OR HOPELESS: 0
SUM OF ALL RESPONSES TO PHQ QUESTIONS 1-9: 0
SUM OF ALL RESPONSES TO PHQ QUESTIONS 1-9: 0

## 2021-05-07 NOTE — PROGRESS NOTES
5/7/2021    Eliecer Morales    Chief Complaint   Patient presents with    Other     ROV    Fatigue     winded from walking back to room. x6 months    Other     weight loss questions.  Other     seeing day for testosterone issues. HPI  History was obtained from the patient. Jeremiah Ackerman is a 70 y.o. male who presents today with follow-up on multiple issues including including increasing fatigue and shortness of breath. Patient has gained about 25 pounds in the last 2 to 3 months probably from fluid accumulation as well as deconditioning. Other diagnoses include hypertension history of alcohol overuse obesity GERD anxiety hyperglycemia and hypothyroidism. In addition his poor hearing is making things tougher in general.  He is seeing Dr. Toby Flaherty for testosterone evaluation and possible replacement. Meds are overall well-tolerated pressure is reasonable weight is up. Hearing grossly is diminished and on review he is due for a multiple labs. Has not had a Covid vaccination either and he has not been taking any thyroid replacement or lactulose for his hepatic encephalopathy/elevated ammonia level. REVIEW OF SYMPTOMS    Review of Systems   Constitutional: Negative for activity change and fatigue. HENT: Negative for congestion, hearing loss, mouth sores and trouble swallowing. Eyes: Negative for pain and visual disturbance. Respiratory: Negative for chest tightness, shortness of breath and wheezing. Cardiovascular: Negative for chest pain and palpitations. Gastrointestinal: Negative for abdominal pain, blood in stool, diarrhea, nausea and vomiting. Endocrine: Negative for polydipsia and polyuria. Genitourinary: Negative for dysuria, frequency and urgency. Musculoskeletal: Negative for arthralgias, gait problem and neck stiffness. Skin: Negative for rash. Allergic/Immunologic: Negative for environmental allergies.    Neurological: Negative for dizziness, seizures, speech difficulty and weakness. Hematological: Does not bruise/bleed easily. Psychiatric/Behavioral: Negative for agitation, confusion and hallucinations.        PAST MEDICAL HISTORY  Past Medical History:   Diagnosis Date    GERD (gastroesophageal reflux disease) 2000    Hypertension        FAMILY HISTORY  Family History   Problem Relation Age of Onset    Hypertension Brother     Diabetes Other        SOCIAL HISTORY  Social History     Socioeconomic History    Marital status:      Spouse name: None    Number of children: None    Years of education: None    Highest education level: None   Occupational History     Comment: Retired    Social Needs    Financial resource strain: None    Food insecurity     Worry: None     Inability: None    Transportation needs     Medical: None     Non-medical: None   Tobacco Use    Smoking status: Never Smoker    Smokeless tobacco: Never Used   Substance and Sexual Activity    Alcohol use: Yes     Comment: drink 1/3 liter of whiskey everyday    Drug use: Never    Sexual activity: Yes     Partners: Female   Lifestyle    Physical activity     Days per week: None     Minutes per session: None    Stress: None   Relationships    Social connections     Talks on phone: None     Gets together: None     Attends Quaker service: None     Active member of club or organization: None     Attends meetings of clubs or organizations: None     Relationship status: None    Intimate partner violence     Fear of current or ex partner: None     Emotionally abused: None     Physically abused: None     Forced sexual activity: None   Other Topics Concern    None   Social History Narrative    None        SURGICAL HISTORY  Past Surgical History:   Procedure Laterality Date    CHOLECYSTECTOMY      VASECTOMY                   CURRENT MEDICATIONS  Current Outpatient Medications   Medication Sig Dispense Refill    DULoxetine (CYMBALTA) 60 MG extended release capsule TAKE 1 Nose: Nose normal.      Mouth/Throat:      Mouth: Mucous membranes are moist.      Pharynx: No oropharyngeal exudate. Eyes:      Pupils: Pupils are equal, round, and reactive to light. Neck:      Musculoskeletal: Normal range of motion and neck supple. No neck rigidity. Cardiovascular:      Rate and Rhythm: Normal rate and regular rhythm. Pulses: Normal pulses. Heart sounds: No murmur. No gallop. Pulmonary:      Effort: Pulmonary effort is normal. No respiratory distress. Breath sounds: Normal breath sounds. No wheezing or rales. Abdominal:      General: There is distension. Palpations: Abdomen is soft. Comments: His abdomen is massively distended/protuberant. Not sure it is super tender. Possible fluid wave present. Musculoskeletal: Normal range of motion. Right lower leg: Edema present. Left lower leg: Edema present. Lymphadenopathy:      Cervical: No cervical adenopathy. Skin:     General: Skin is warm and dry. Coloration: Skin is not jaundiced. Neurological:      General: No focal deficit present. Mental Status: He is alert and oriented to person, place, and time. Mental status is at baseline. Cranial Nerves: No cranial nerve deficit. Motor: No weakness. Gait: Gait normal.   Psychiatric:         Mood and Affect: Mood normal.         Behavior: Behavior normal.         Thought Content: Thought content normal.         ASSESSMENT & PLAN     Diagnosis Orders   1. Essential hypertension  CBC WITH AUTO DIFFERENTIAL    COMPREHENSIVE METABOLIC PANEL   2. Alcoholic cirrhosis of liver with ascites (HCC)  furosemide (LASIX) 40 MG tablet   3. Acute neck pain  tiZANidine (ZANAFLEX) 2 MG tablet   4. Trapezius muscle spasm  tiZANidine (ZANAFLEX) 2 MG tablet   5. Hyperglycemia  HEMOGLOBIN A1C   6.  Acquired hypothyroidism  TSH with Reflex    T4, FREE   7. Class 3 severe obesity due to excess calories without serious comorbidity with body mass index (BMI) of 40.0 to 44.9 in adult ([facility] [address]     8. Gastroesophageal reflux disease without esophagitis     9. Fatigue, unspecified type  Vitamin D 25 Hydroxy    AMMONIA   10. Alcohol use  MAGNESIUM    AMMONIA   11. Bilateral hearing loss, unspecified hearing loss type     12. Increased ammonia level     Long discussion carried out- important for him to be compliant with his meds and his diet. He has not seen his GI physician for quite some time. Refills will be given. We will check multiple labs: after review of situation we will check CMP, CBC, A1c, mag level, TSH, free T4, vitamin D3, and ammonia level. Reinforced importance of getting his Covid vaccine and have him follow-up for all results. Advised to see his GI physician Janet Diaz as soon as possible. He is to call with questions or problems. Return in about 3 months (around 8/7/2021), or if symptoms worsen or fail to improve.          Electronically signed by Zabrina Zayas MD on 5/7/2021

## 2021-05-13 ENCOUNTER — TELEPHONE (OUTPATIENT)
Dept: FAMILY MEDICINE CLINIC | Age: 76
End: 2021-05-13

## 2021-05-13 DIAGNOSIS — F41.9 ANXIETY: Primary | ICD-10-CM

## 2021-05-13 NOTE — TELEPHONE ENCOUNTER
Question:      Patient wants to know why he was prescribed Tizanidine----he doesn't know what it is used for.

## 2021-05-14 RX ORDER — ALPRAZOLAM 0.25 MG/1
0.25 TABLET ORAL DAILY PRN
Qty: 30 TABLET | Refills: 0 | Status: SHIPPED | OUTPATIENT
Start: 2021-05-14 | End: 2021-06-13

## 2021-08-09 ENCOUNTER — OFFICE VISIT (OUTPATIENT)
Dept: FAMILY MEDICINE CLINIC | Age: 76
End: 2021-08-09
Payer: COMMERCIAL

## 2021-08-09 VITALS
OXYGEN SATURATION: 95 % | WEIGHT: 315 LBS | BODY MASS INDEX: 47.74 KG/M2 | SYSTOLIC BLOOD PRESSURE: 166 MMHG | HEART RATE: 77 BPM | HEIGHT: 68 IN | DIASTOLIC BLOOD PRESSURE: 80 MMHG

## 2021-08-09 DIAGNOSIS — R53.83 FATIGUE, UNSPECIFIED TYPE: ICD-10-CM

## 2021-08-09 DIAGNOSIS — F41.9 ANXIETY: ICD-10-CM

## 2021-08-09 DIAGNOSIS — K70.31 ALCOHOLIC CIRRHOSIS OF LIVER WITH ASCITES (HCC): ICD-10-CM

## 2021-08-09 DIAGNOSIS — E03.9 ACQUIRED HYPOTHYROIDISM: ICD-10-CM

## 2021-08-09 DIAGNOSIS — I10 ESSENTIAL HYPERTENSION: ICD-10-CM

## 2021-08-09 DIAGNOSIS — R79.89 INCREASED AMMONIA LEVEL: ICD-10-CM

## 2021-08-09 DIAGNOSIS — E66.01 CLASS 3 SEVERE OBESITY DUE TO EXCESS CALORIES WITHOUT SERIOUS COMORBIDITY WITH BODY MASS INDEX (BMI) OF 40.0 TO 44.9 IN ADULT (HCC): ICD-10-CM

## 2021-08-09 DIAGNOSIS — R06.02 SHORTNESS OF BREATH: Primary | ICD-10-CM

## 2021-08-09 DIAGNOSIS — G47.33 OSA (OBSTRUCTIVE SLEEP APNEA): ICD-10-CM

## 2021-08-09 PROCEDURE — 99214 OFFICE O/P EST MOD 30 MIN: CPT | Performed by: FAMILY MEDICINE

## 2021-08-09 RX ORDER — TADALAFIL 20 MG/1
20 TABLET ORAL
Status: ON HOLD | COMMUNITY
End: 2022-06-26 | Stop reason: HOSPADM

## 2021-08-09 RX ORDER — CELECOXIB 200 MG/1
200 CAPSULE ORAL DAILY
Qty: 30 CAPSULE | Refills: 1 | Status: SHIPPED | OUTPATIENT
Start: 2021-08-09 | End: 2022-03-11

## 2021-08-09 ASSESSMENT — ENCOUNTER SYMPTOMS
NAUSEA: 0
WHEEZING: 0
CHEST TIGHTNESS: 0
DIARRHEA: 0
TROUBLE SWALLOWING: 0
ABDOMINAL PAIN: 0
BLOOD IN STOOL: 0
VOMITING: 0
EYE PAIN: 0
ABDOMINAL DISTENTION: 1
SHORTNESS OF BREATH: 1

## 2021-08-09 NOTE — PROGRESS NOTES
8/9/2021    Liv Ochoa    Chief Complaint   Patient presents with    3 Month Follow-Up     falls asleep all the time. uses cpap at night.  Shortness of Breath     walking to garage 100feet causes having to sit.  Fatigue     \"gets more energy later in the day\"    Medication Problem     questions about wifes celebrex, been taking it. HPI  History was obtained from the patient. Xiomara Bates is a 70 y.o. male who presents today with complaints of shortness of breath and increased fatigue. On review he has gained about 40 pounds in the past 7 or 8 months. Has known alcohol abuse with cirrhosis abdominal girth has increased. Has some increased edema also admits to just a little bit of generalized fatigue but mainly seems to be fairly alert. Has a history of trying to faithfully uses CPAP machine. Tries to take his meds regularly pressure seem to be up a bit today. His last cardiac echo was just over 2 years ago. He has had his Covid shot. Has not had labs for quite some time. Sesar Light REVIEW OF SYMPTOMS    Review of Systems   Constitutional: Positive for fatigue. Negative for activity change. HENT: Negative for congestion, hearing loss, mouth sores and trouble swallowing. Eyes: Negative for pain and visual disturbance. Respiratory: Positive for shortness of breath. Negative for chest tightness and wheezing. Cardiovascular: Positive for leg swelling. Negative for chest pain and palpitations. Gastrointestinal: Positive for abdominal distention. Negative for abdominal pain, blood in stool, diarrhea, nausea and vomiting. Endocrine: Negative for cold intolerance, polydipsia and polyuria. Genitourinary: Negative for dysuria, frequency and urgency. Musculoskeletal: Negative for arthralgias, gait problem and neck stiffness. Skin: Negative for rash. Allergic/Immunologic: Negative for environmental allergies. Neurological: Negative for dizziness, seizures, speech difficulty and weakness. Hematological: Does not bruise/bleed easily. Psychiatric/Behavioral: Negative for agitation, confusion, dysphoric mood, hallucinations and suicidal ideas. The patient is nervous/anxious. PAST MEDICAL HISTORY  Past Medical History:   Diagnosis Date    GERD (gastroesophageal reflux disease) 2000    Hypertension        FAMILY HISTORY  Family History   Problem Relation Age of Onset    Hypertension Brother     Diabetes Other        SOCIAL HISTORY  Social History     Socioeconomic History    Marital status:      Spouse name: None    Number of children: None    Years of education: None    Highest education level: None   Occupational History     Comment: Retired    Tobacco Use    Smoking status: Never Smoker    Smokeless tobacco: Never Used   Substance and Sexual Activity    Alcohol use: Yes     Comment: drink 1/3 liter of whiskey everyday    Drug use: Never    Sexual activity: Yes     Partners: Female   Other Topics Concern    None   Social History Narrative    None     Social Determinants of Health     Financial Resource Strain:     Difficulty of Paying Living Expenses:    Food Insecurity:     Worried About Running Out of Food in the Last Year:     Ran Out of Food in the Last Year:    Transportation Needs:     Lack of Transportation (Medical):      Lack of Transportation (Non-Medical):    Physical Activity:     Days of Exercise per Week:     Minutes of Exercise per Session:    Stress:     Feeling of Stress :    Social Connections:     Frequency of Communication with Friends and Family:     Frequency of Social Gatherings with Friends and Family:     Attends Caodaism Services:     Active Member of Clubs or Organizations:     Attends Club or Organization Meetings:     Marital Status:    Intimate Partner Violence:     Fear of Current or Ex-Partner:     Emotionally Abused:     Physically Abused:     Sexually Abused:         SURGICAL HISTORY  Past Surgical History: 166/80 (Site: Right Upper Arm, Position: Sitting, Cuff Size: Large Adult)   Pulse 77   Ht 5' 8\" (1.727 m)   Wt (!) 323 lb (146.5 kg)   SpO2 95%   BMI 49.11 kg/m²     Physical Exam  Vitals and nursing note reviewed. Constitutional:       General: He is not in acute distress. Appearance: He is well-developed. He is obese. He is ill-appearing. He is not toxic-appearing or diaphoretic. HENT:      Head: Normocephalic and atraumatic. Nose: Nose normal. No congestion. Mouth/Throat:      Mouth: Mucous membranes are moist.      Pharynx: Oropharynx is clear. No oropharyngeal exudate. Eyes:      Pupils: Pupils are equal, round, and reactive to light. Cardiovascular:      Rate and Rhythm: Normal rate and regular rhythm. Heart sounds: Normal heart sounds. Pulmonary:      Effort: Pulmonary effort is normal. No respiratory distress. Breath sounds: Normal breath sounds. No wheezing, rhonchi or rales. Abdominal:      General: There is distension. Palpations: Abdomen is soft. Tenderness: There is no abdominal tenderness. There is no rebound. Comments: Abdomen is markedly protuberant somewhat tense. I could not elicit any mass or tenderness. No definite organomegaly found and no fluid wave was noted. Musculoskeletal:         General: Swelling present. Normal range of motion. Cervical back: Normal range of motion and neck supple. No rigidity. Right lower leg: Edema present. Left lower leg: Edema present. Lymphadenopathy:      Cervical: No cervical adenopathy. Skin:     General: Skin is warm and dry. Neurological:      General: No focal deficit present. Mental Status: He is alert and oriented to person, place, and time. Mental status is at baseline. Cranial Nerves: No cranial nerve deficit. Motor: No weakness.       Gait: Gait normal.   Psychiatric:         Mood and Affect: Mood normal.         Behavior: Behavior normal.         Thought Content: Thought content normal.         ASSESSMENT & PLAN     Diagnosis Orders   1. Shortness of breath  CT CHEST W CONTRAST   2. Increased ammonia level-by hx     3. Class 3 severe obesity due to excess calories without serious comorbidity with body mass index (BMI) of 40.0 to 44.9 in adult ([address]     4. Acquired hypothyroidism     5. Anxiety     6. Alcoholic cirrhosis of liver with ascites (HCC)-Dr Jacques  CT ABDOMEN PELVIS W WO CONTRAST Additional Contrast? Radiologist Recommendation   7. Essential hypertension     8. JENNIFER (obstructive sleep apnea)     9. Fatigue, unspecified type     Will check CMP, CBC, mag level, A1c, free T4, TSH, vitamin D level, and ammonia level today these orders were in epic. We will also set up CT of chest and abdomen and pelvis-we will try to follow-up on cirrhosis and abdominal distention as well as history of lymphadenopathy on CT scan. He is to follow-up for all test results are back to see him back within 3 weeks continue on healthy lifestyle and avoid all alcohol. We may well need to titrate his diuretic meds or have him see GI medicine. Return in about 3 weeks (around 8/30/2021).          Electronically signed by Jeffery Alexis MD on 8/9/2021

## 2021-08-12 ENCOUNTER — TELEPHONE (OUTPATIENT)
Dept: FAMILY MEDICINE CLINIC | Age: 76
End: 2021-08-12

## 2021-08-19 ENCOUNTER — HOSPITAL ENCOUNTER (OUTPATIENT)
Dept: CT IMAGING | Age: 76
Discharge: HOME | End: 2021-08-19
Payer: COMMERCIAL

## 2021-08-19 DIAGNOSIS — K70.31 ALCOHOLIC CIRRHOSIS OF LIVER WITH ASCITES (HCC): ICD-10-CM

## 2021-08-19 DIAGNOSIS — R06.02 SHORTNESS OF BREATH: ICD-10-CM

## 2021-08-19 PROCEDURE — 71260 CT THORAX DX C+: CPT

## 2021-08-19 PROCEDURE — 2580000003 HC RX 258: Performed by: FAMILY MEDICINE

## 2021-08-19 PROCEDURE — 6360000004 HC RX CONTRAST MEDICATION: Performed by: FAMILY MEDICINE

## 2021-08-19 PROCEDURE — 74178 CT ABD&PLV WO CNTR FLWD CNTR: CPT

## 2021-08-19 RX ORDER — SODIUM CHLORIDE 0.9 % (FLUSH) 0.9 %
5-40 SYRINGE (ML) INJECTION PRN
Status: DISCONTINUED | OUTPATIENT
Start: 2021-08-19 | End: 2021-08-20 | Stop reason: HOSPADM

## 2021-08-19 RX ADMIN — SODIUM CHLORIDE, PRESERVATIVE FREE 10 ML: 5 INJECTION INTRAVENOUS at 10:05

## 2021-08-19 RX ADMIN — IOPAMIDOL 95 ML: 755 INJECTION, SOLUTION INTRAVENOUS at 10:05

## 2021-08-30 ENCOUNTER — TELEPHONE (OUTPATIENT)
Dept: GASTROENTEROLOGY | Age: 76
End: 2021-08-30

## 2021-08-30 ENCOUNTER — OFFICE VISIT (OUTPATIENT)
Dept: FAMILY MEDICINE CLINIC | Age: 76
End: 2021-08-30
Payer: COMMERCIAL

## 2021-08-30 VITALS
BODY MASS INDEX: 47.74 KG/M2 | OXYGEN SATURATION: 97 % | HEIGHT: 68 IN | HEART RATE: 70 BPM | SYSTOLIC BLOOD PRESSURE: 130 MMHG | DIASTOLIC BLOOD PRESSURE: 58 MMHG | WEIGHT: 315 LBS

## 2021-08-30 DIAGNOSIS — K70.31 ALCOHOLIC CIRRHOSIS OF LIVER WITH ASCITES (HCC): ICD-10-CM

## 2021-08-30 DIAGNOSIS — E66.01 CLASS 3 SEVERE OBESITY DUE TO EXCESS CALORIES WITHOUT SERIOUS COMORBIDITY WITH BODY MASS INDEX (BMI) OF 40.0 TO 44.9 IN ADULT (HCC): ICD-10-CM

## 2021-08-30 DIAGNOSIS — R06.02 SHORTNESS OF BREATH: ICD-10-CM

## 2021-08-30 DIAGNOSIS — K76.6 PORTAL HYPERTENSION (HCC): ICD-10-CM

## 2021-08-30 DIAGNOSIS — J98.01 BRONCHOSPASM: ICD-10-CM

## 2021-08-30 DIAGNOSIS — K70.31 ALCOHOLIC CIRRHOSIS OF LIVER WITH ASCITES (HCC): Primary | ICD-10-CM

## 2021-08-30 PROCEDURE — 99213 OFFICE O/P EST LOW 20 MIN: CPT | Performed by: FAMILY MEDICINE

## 2021-08-30 RX ORDER — FUROSEMIDE 40 MG/1
TABLET ORAL
Qty: 180 TABLET | Refills: 1 | Status: ON HOLD | OUTPATIENT
Start: 2021-08-30 | End: 2022-03-21 | Stop reason: SDUPTHER

## 2021-08-30 RX ORDER — SPIRONOLACTONE 50 MG/1
100 TABLET, FILM COATED ORAL DAILY
Qty: 90 TABLET | Refills: 1 | Status: SHIPPED | OUTPATIENT
Start: 2021-08-30 | End: 2022-02-17 | Stop reason: SDUPTHER

## 2021-08-30 RX ORDER — ALBUTEROL SULFATE 90 UG/1
2 AEROSOL, METERED RESPIRATORY (INHALATION) 4 TIMES DAILY PRN
Qty: 1 INHALER | Refills: 0 | Status: SHIPPED | OUTPATIENT
Start: 2021-08-30 | End: 2022-07-26 | Stop reason: SDUPTHER

## 2021-08-30 ASSESSMENT — ENCOUNTER SYMPTOMS
EYE PAIN: 0
WHEEZING: 0
CHEST TIGHTNESS: 0
SHORTNESS OF BREATH: 1
TROUBLE SWALLOWING: 0
VOMITING: 0
DIARRHEA: 0
ABDOMINAL DISTENTION: 1
NAUSEA: 0
COUGH: 1
BLOOD IN STOOL: 0
ABDOMINAL PAIN: 0

## 2021-08-30 NOTE — PROGRESS NOTES
8/30/2021    Shaheed Washington    Chief Complaint   Patient presents with    Other     3 week follow-up and test results     Other     redness on left leg        HPI  History was obtained from the patient and his wife. Jacques Rubin is a 70 y.o. male who presents today with follow-up on recent CT scanning which confirmed severe cirrhosis with secondary ascites and esophageal varices. He continues to gain weight and have abdominal distention also has some peripheral edema with some early stasis changes. We tried to give him a reported this before but were unable to contact them. He is having little bit of shortness of breath with occasional cough and wheeze when he is sleeping in bed at night. Does not have an inhaler he would like to go to a different GI specialist.  After discussion I will plan on increasing diuretic, refer to different GI specialist- will need ASAP appointment and hopefully can get set up for a large-volume paracentesis. REVIEW OF SYMPTOMS    Review of Systems   Constitutional: Positive for unexpected weight change. Negative for activity change and fatigue. HENT: Negative for congestion, hearing loss, mouth sores and trouble swallowing. Eyes: Negative for pain and visual disturbance. Respiratory: Positive for cough and shortness of breath. Negative for chest tightness and wheezing. Cardiovascular: Positive for leg swelling. Negative for chest pain and palpitations. Gastrointestinal: Positive for abdominal distention. Negative for abdominal pain, blood in stool, diarrhea, nausea and vomiting. Endocrine: Negative for cold intolerance, polydipsia and polyuria. Genitourinary: Negative for dysuria, frequency and urgency. Musculoskeletal: Negative for arthralgias, gait problem and neck stiffness. Skin: Negative for rash. Allergic/Immunologic: Negative for environmental allergies. Neurological: Negative for dizziness, seizures, speech difficulty and weakness.    Hematological: Does not bruise/bleed easily. Psychiatric/Behavioral: Negative for agitation, confusion and hallucinations. PAST MEDICAL HISTORY  Past Medical History:   Diagnosis Date    GERD (gastroesophageal reflux disease) 2000    Hypertension        FAMILY HISTORY  Family History   Problem Relation Age of Onset    Hypertension Brother     Diabetes Other        SOCIAL HISTORY  Social History     Socioeconomic History    Marital status:      Spouse name: Not on file    Number of children: Not on file    Years of education: Not on file    Highest education level: Not on file   Occupational History     Comment: Retired    Tobacco Use    Smoking status: Never Smoker    Smokeless tobacco: Never Used   Substance and Sexual Activity    Alcohol use: Yes     Comment: drink 1/3 liter of whiskey everyday    Drug use: Never    Sexual activity: Yes     Partners: Female   Other Topics Concern    Not on file   Social History Narrative    Not on file     Social Determinants of Health     Financial Resource Strain:     Difficulty of Paying Living Expenses:    Food Insecurity:     Worried About Running Out of Food in the Last Year:     [address] in the Last Year:    Transportation Needs:     Lack of Transportation (Medical):      Lack of Transportation (Non-Medical):    Physical Activity:     Days of Exercise per Week:     Minutes of Exercise per Session:    Stress:     Feeling of Stress :    Social Connections:     Frequency of Communication with Friends and Family:     Frequency of Social Gatherings with Friends and Family:     Attends Congregational Services:     Active Member of Clubs or Organizations:     Attends Club or Organization Meetings:     Marital Status:    Intimate Partner Violence:     Fear of Current or Ex-Partner:     Emotionally Abused:     Physically Abused:     Sexually Abused:         SURGICAL HISTORY  Past Surgical History:   Procedure Laterality Date    CHOLECYSTECTOMY      VASECTOMY                   CURRENT MEDICATIONS  Current Outpatient Medications   Medication Sig Dispense Refill    furosemide (LASIX) 40 MG tablet TAKE 1 tab am and pm 180 tablet 1    spironolactone (ALDACTONE) 50 MG tablet Take 2 tablets by mouth daily 90 tablet 1    albuterol sulfate HFA (VENTOLIN HFA) 108 (90 Base) MCG/ACT inhaler Inhale 2 puffs into the lungs 4 times daily as needed for Wheezing 1 Inhaler 0    Testosterone Cypionate 200 MG/ML KIT Inject into the muscle.  tadalafil (CIALIS) 20 MG tablet Take 20 mg by mouth as needed for Erectile Dysfunction      celecoxib (CELEBREX) 200 MG capsule Take 1 capsule by mouth daily 30 capsule 1    DULoxetine (CYMBALTA) 60 MG extended release capsule TAKE 1 CAPSULE BY MOUTH ONCE DAILY 90 capsule 1    levothyroxine (SYNTHROID) 25 MCG tablet Take 1 tablet by mouth Daily 90 tablet 1    vitamin E 400 UNIT capsule Take 800 Units by mouth daily      magnesium oxide (MAG-OX) 400 MG tablet TAKE 1 2 (ONE HALF) TABLET BY MOUTH TWICE DAILY  1    Lactulose (CONSTULOSE PO) Take by mouth      Multiple Vitamin (MULTI VITAMIN MENS PO) Take 1 tablet by mouth daily      esomeprazole Magnesium (NEXIUM) 20 MG PACK Take 20 mg by mouth daily      nadolol (CORGARD) 40 MG tablet Take 1 tablet by mouth daily 90 tablet 1    tiZANidine (ZANAFLEX) 2 MG tablet Take 1 tablet by mouth every 8 hours as needed (muscle spasms) (Patient not taking: Reported on 8/30/2021) 30 tablet 0    NONFORMULARY 1 capsule LiverMD      NONFORMULARY 1 capsule MIND tech      NONFORMULARY 1 capsule SizeGenix      Omega-3 1000 MG CAPS Take 1 capsule by mouth daily       No current facility-administered medications for this visit.        ALLERGIES  Allergies   Allergen Reactions    Lisinopril Swelling     Tongue swelling      Zetia [Ezetimibe] Swelling     Facial swelling    Atorvastatin     Codeine Other (See Comments)     Blood pressure drops    Hydrochlorothiazide     Hydroxyzine      Fatigue and depressed    Lexapro [Escitalopram Oxalate]      Increased depression    Pravastatin        PHYSICAL EXAM    BP (!) 130/58 (Site: Left Upper Arm, Position: Sitting, Cuff Size: Large Adult)   Pulse 70   Ht 5' 8\" (1.727 m)   Wt (!) 332 lb (150.6 kg)   SpO2 97%   BMI 50.48 kg/m²     Physical Exam  Vitals and nursing note reviewed. Constitutional:       General: He is not in acute distress. Appearance: He is well-developed. He is obese. He is not ill-appearing or toxic-appearing. HENT:      Head: Normocephalic and atraumatic. Nose: Nose normal.      Mouth/Throat:      Mouth: Mucous membranes are moist.      Pharynx: Oropharynx is clear. Eyes:      Pupils: Pupils are equal, round, and reactive to light. Cardiovascular:      Rate and Rhythm: Normal rate and regular rhythm. Heart sounds: Normal heart sounds. No murmur heard. Pulmonary:      Effort: Pulmonary effort is normal. No respiratory distress. Breath sounds: No stridor. No wheezing or rales. Comments: Slight expiratory prolongation. Abdominal:      General: There is distension. Palpations: Abdomen is soft. Tenderness: There is no guarding or rebound. Musculoskeletal:         General: Swelling present. Normal range of motion. Cervical back: Normal range of motion and neck supple. No rigidity. Left lower leg: Edema present. Comments: Patient with 1+ edema right 2-3+ on the left with a little early stasis change. Slight increased redness no increased warmth to increased edema compared to the right side   Lymphadenopathy:      Cervical: No cervical adenopathy. Skin:     General: Skin is warm and dry. Neurological:      General: No focal deficit present. Mental Status: He is alert and oriented to person, place, and time. Mental status is at baseline. Cranial Nerves: No cranial nerve deficit.       Gait: Gait normal.   Psychiatric:         Thought Content: Thought content normal.         ASSESSMENT & PLAN     Diagnosis Orders   1. Alcoholic [facility] of liver with ascites   [facility]   2. Class 3 severe obesity due to excess calories without serious comorbidity with body mass index (BMI) of 40.0 to 44.9 in adult ([facility] [address]     3. Portal hypertension ([facility] [address]     4. Alcoholic cirrhosis of liver with ascites [facility])  [facility]    furosemide (LASIX) 40 MG tablet   5. Bronchospasm     6. Shortness of breath     Patient would like to change his GI specialist.  Will make appointment with [facility] ASAP. Will increase Lasix to 40 mg  twice daily, decrease salt in die,t keep legs elevated, increase spironolactone to 50 mg 2 daily, monitor fluid intake and we will add albuterol inhaler 2 puffs 4 times daily as needed no bronchospasm. Con't the rest of his regimen as before-avoid absolutely all alcohol. I believe he will need an ASAP paracentesis( high-volume)shortly. If he gets worse and or more pain/ shortness of breath he is to go to the emergency room. I discussed this plan with patient and his wife in detail. Return in about 6 weeks (around 10/11/2021).          Electronically signed by Shante Jacob MD on 8/30/2021

## 2021-08-31 ENCOUNTER — TELEPHONE (OUTPATIENT)
Dept: FAMILY MEDICINE CLINIC | Age: 76
End: 2021-08-31

## 2021-08-31 NOTE — TELEPHONE ENCOUNTER
Patient called and stated that the [facility] pharmacy would not release the Lasix 40 mg take 1 in the am and 1 in the pm that was sent to the pharmacy 8-30-21  I called [facility] [address] ad spoke to Pharmacist Ziyad Nurse who states that the insurnace will not cover this script until patient uses the script he picked up a [facility] for Lasix 40 mg qd on 8-20-21.  The pharmacist Ziyad Nurse states that this new script written 8-30-21 can not be picked up until 9-23-21  Patient will take th 40 mg Lasix 1am & 1pm  Patient notified and voiced understanding

## 2021-09-03 ENCOUNTER — TELEPHONE (OUTPATIENT)
Dept: GASTROENTEROLOGY | Age: 76
End: 2021-09-03

## 2021-09-13 ENCOUNTER — APPOINTMENT (OUTPATIENT)
Dept: GENERAL RADIOLOGY | Age: 76
DRG: 433 | End: 2021-09-13
Payer: MEDICARE

## 2021-09-13 ENCOUNTER — APPOINTMENT (OUTPATIENT)
Dept: CT IMAGING | Age: 76
DRG: 433 | End: 2021-09-13
Payer: MEDICARE

## 2021-09-13 ENCOUNTER — APPOINTMENT (OUTPATIENT)
Dept: INTERVENTIONAL RADIOLOGY/VASCULAR | Age: 76
DRG: 433 | End: 2021-09-13
Payer: MEDICARE

## 2021-09-13 ENCOUNTER — HOSPITAL ENCOUNTER (INPATIENT)
Age: 76
LOS: 2 days | Discharge: HOME | DRG: 433 | End: 2021-09-15
Attending: STUDENT IN AN ORGANIZED HEALTH CARE EDUCATION/TRAINING PROGRAM
Payer: MEDICARE

## 2021-09-13 ENCOUNTER — APPOINTMENT (OUTPATIENT)
Dept: ULTRASOUND IMAGING | Age: 76
DRG: 433 | End: 2021-09-13
Payer: MEDICARE

## 2021-09-13 DIAGNOSIS — R60.1 ANASARCA: ICD-10-CM

## 2021-09-13 DIAGNOSIS — I21.4 NSTEMI (NON-ST ELEVATED MYOCARDIAL INFARCTION) (HCC): Primary | ICD-10-CM

## 2021-09-13 PROBLEM — R06.02 SOB (SHORTNESS OF BREATH): Status: ACTIVE | Noted: 2021-09-13

## 2021-09-13 LAB
ALBUMIN FLUID: 1.1 GM/DL
ALBUMIN SERPL-MCNC: 2.7 GM/DL (ref 3.4–5)
ALP BLD-CCNC: 139 IU/L (ref 40–129)
ALT SERPL-CCNC: 16 U/L (ref 10–40)
AMMONIA: 88 UMOL/L (ref 16–60)
AMYLASE FLUID: 13 U/L
ANION GAP SERPL CALCULATED.3IONS-SCNC: 14 MMOL/L (ref 4–16)
AST SERPL-CCNC: 33 IU/L (ref 15–37)
BACTERIA: NEGATIVE /HPF
BASOPHILS ABSOLUTE: 0.1 K/CU MM
BASOPHILS RELATIVE PERCENT: 1.5 % (ref 0–1)
BILIRUB SERPL-MCNC: 0.8 MG/DL (ref 0–1)
BILIRUBIN URINE: NEGATIVE MG/DL
BLOOD, URINE: NEGATIVE
BUN BLDV-MCNC: 15 MG/DL (ref 6–23)
CALCIUM SERPL-MCNC: 9.4 MG/DL (ref 8.3–10.6)
CHLORIDE BLD-SCNC: 101 MMOL/L (ref 99–110)
CLARITY: CLEAR
CO2: 23 MMOL/L (ref 21–32)
COLOR: YELLOW
CREAT SERPL-MCNC: 1 MG/DL (ref 0.9–1.3)
D DIMER: 1606 NG/ML(DDU)
DIFFERENTIAL TYPE: ABNORMAL
EKG ATRIAL RATE: 66 BPM
EKG DIAGNOSIS: NORMAL
EKG P AXIS: 12 DEGREES
EKG P-R INTERVAL: 186 MS
EKG Q-T INTERVAL: 426 MS
EKG QRS DURATION: 70 MS
EKG QTC CALCULATION (BAZETT): 446 MS
EKG R AXIS: 28 DEGREES
EKG T AXIS: 18 DEGREES
EKG VENTRICULAR RATE: 66 BPM
EOSINOPHILS ABSOLUTE: 0.6 K/CU MM
EOSINOPHILS RELATIVE PERCENT: 7 % (ref 0–3)
FLUID TYPE: NORMAL INDEX
GFR AFRICAN AMERICAN: >60 ML/MIN/1.73M2
GFR NON-AFRICAN AMERICAN: >60 ML/MIN/1.73M2
GLUCOSE BLD-MCNC: 100 MG/DL (ref 70–99)
GLUCOSE, FLUID: 127 MG/DL
GLUCOSE, URINE: NEGATIVE MG/DL
HCT VFR BLD CALC: 37.8 % (ref 42–52)
HEMOGLOBIN: 12 GM/DL (ref 13.5–18)
IMMATURE NEUTROPHIL %: 0.2 % (ref 0–0.43)
INR BLD: 1.09 INDEX
KETONES, URINE: NEGATIVE MG/DL
LACTATE DEHYDROGENASE, FLUID: 44 IU/L
LEUKOCYTE ESTERASE, URINE: NEGATIVE
LIPASE: 38 IU/L (ref 13–60)
LYMPHOCYTES ABSOLUTE: 0.8 K/CU MM
LYMPHOCYTES RELATIVE PERCENT: 9.4 % (ref 24–44)
LYMPHOCYTES, BODY FLUID: 29 %
MCH RBC QN AUTO: 31.9 PG (ref 27–31)
MCHC RBC AUTO-ENTMCNC: 31.7 % (ref 32–36)
MCV RBC AUTO: 100.5 FL (ref 78–100)
MESOTHELIAL FLUID: 0 /100 WBC
MONOCYTE, FLUID: 58 %
MONOCYTES ABSOLUTE: 1 K/CU MM
MONOCYTES RELATIVE PERCENT: 12.1 % (ref 0–4)
NEUTROPHIL, FLUID: 13 %
NITRITE URINE, QUANTITATIVE: NEGATIVE
NUCLEATED RBC %: 0 %
OTHER CELLS FLUID: 0
PDW BLD-RTO: 15 % (ref 11.7–14.9)
PH, URINE: 6 (ref 5–8)
PLATELET # BLD: 192 K/CU MM (ref 140–440)
PMV BLD AUTO: 10.9 FL (ref 7.5–11.1)
POTASSIUM SERPL-SCNC: 4.2 MMOL/L (ref 3.5–5.1)
PRO-BNP: 625.6 PG/ML
PROTEIN FLUID: 1.9 GM/DL
PROTEIN UA: NEGATIVE MG/DL
PROTHROMBIN TIME: 14.1 SECONDS (ref 11.7–14.5)
RBC # BLD: 3.76 M/CU MM (ref 4.6–6.2)
RBC FLUID: 267 /CU MM
RBC URINE: NORMAL /HPF (ref 0–3)
SEGMENTED NEUTROPHILS ABSOLUTE COUNT: 5.7 K/CU MM
SEGMENTED NEUTROPHILS RELATIVE PERCENT: 69.8 % (ref 36–66)
SODIUM BLD-SCNC: 138 MMOL/L (ref 135–145)
SOURCE FLUID: NORMAL
SPECIFIC GRAVITY UA: 1 (ref 1–1.03)
TOTAL IMMATURE NEUTOROPHIL: 0.02 K/CU MM
TOTAL NUCLEATED RBC: 0 K/CU MM
TOTAL PROTEIN: 7.5 GM/DL (ref 6.4–8.2)
TRICHOMONAS: NORMAL /HPF
TROPONIN T: 0.01 NG/ML
TROPONIN T: 0.01 NG/ML
UROBILINOGEN, URINE: NEGATIVE MG/DL (ref 0.2–1)
WBC # BLD: 8.1 K/CU MM (ref 4–10.5)
WBC FLUID: 245 /CU MM
WBC UA: <1 /HPF (ref 0–2)

## 2021-09-13 PROCEDURE — 0W9G30Z DRAINAGE OF PERITONEAL CAVITY WITH DRAINAGE DEVICE, PERCUTANEOUS APPROACH: ICD-10-PCS | Performed by: RADIOLOGY

## 2021-09-13 PROCEDURE — 93010 ELECTROCARDIOGRAM REPORT: CPT | Performed by: INTERNAL MEDICINE

## 2021-09-13 PROCEDURE — 6370000000 HC RX 637 (ALT 250 FOR IP): Performed by: CLINICAL NURSE SPECIALIST

## 2021-09-13 PROCEDURE — 84157 ASSAY OF PROTEIN OTHER: CPT

## 2021-09-13 PROCEDURE — 88305 TISSUE EXAM BY PATHOLOGIST: CPT

## 2021-09-13 PROCEDURE — 83690 ASSAY OF LIPASE: CPT

## 2021-09-13 PROCEDURE — 2709999900 HC NON-CHARGEABLE SUPPLY

## 2021-09-13 PROCEDURE — 82042 OTHER SOURCE ALBUMIN QUAN EA: CPT

## 2021-09-13 PROCEDURE — 85379 FIBRIN DEGRADATION QUANT: CPT

## 2021-09-13 PROCEDURE — 82140 ASSAY OF AMMONIA: CPT

## 2021-09-13 PROCEDURE — 6370000000 HC RX 637 (ALT 250 FOR IP): Performed by: STUDENT IN AN ORGANIZED HEALTH CARE EDUCATION/TRAINING PROGRAM

## 2021-09-13 PROCEDURE — 2580000003 HC RX 258: Performed by: CLINICAL NURSE SPECIALIST

## 2021-09-13 PROCEDURE — 87070 CULTURE OTHR SPECIMN AEROBIC: CPT

## 2021-09-13 PROCEDURE — 96374 THER/PROPH/DIAG INJ IV PUSH: CPT

## 2021-09-13 PROCEDURE — 71045 X-RAY EXAM CHEST 1 VIEW: CPT

## 2021-09-13 PROCEDURE — 82150 ASSAY OF AMYLASE: CPT

## 2021-09-13 PROCEDURE — 80053 COMPREHEN METABOLIC PANEL: CPT

## 2021-09-13 PROCEDURE — 85610 PROTHROMBIN TIME: CPT

## 2021-09-13 PROCEDURE — 88108 CYTOPATH CONCENTRATE TECH: CPT

## 2021-09-13 PROCEDURE — 89051 BODY FLUID CELL COUNT: CPT

## 2021-09-13 PROCEDURE — P9047 ALBUMIN (HUMAN), 25%, 50ML: HCPCS | Performed by: RADIOLOGY

## 2021-09-13 PROCEDURE — C1729 CATH, DRAINAGE: HCPCS

## 2021-09-13 PROCEDURE — 6360000002 HC RX W HCPCS: Performed by: RADIOLOGY

## 2021-09-13 PROCEDURE — 85025 COMPLETE CBC W/AUTO DIFF WBC: CPT

## 2021-09-13 PROCEDURE — 93005 ELECTROCARDIOGRAM TRACING: CPT | Performed by: STUDENT IN AN ORGANIZED HEALTH CARE EDUCATION/TRAINING PROGRAM

## 2021-09-13 PROCEDURE — 87205 SMEAR GRAM STAIN: CPT

## 2021-09-13 PROCEDURE — 49083 ABD PARACENTESIS W/IMAGING: CPT

## 2021-09-13 PROCEDURE — 93970 EXTREMITY STUDY: CPT

## 2021-09-13 PROCEDURE — 84484 ASSAY OF TROPONIN QUANT: CPT

## 2021-09-13 PROCEDURE — 94761 N-INVAS EAR/PLS OXIMETRY MLT: CPT

## 2021-09-13 PROCEDURE — 81001 URINALYSIS AUTO W/SCOPE: CPT

## 2021-09-13 PROCEDURE — 82945 GLUCOSE OTHER FLUID: CPT

## 2021-09-13 PROCEDURE — 83880 ASSAY OF NATRIURETIC PEPTIDE: CPT

## 2021-09-13 PROCEDURE — 87040 BLOOD CULTURE FOR BACTERIA: CPT

## 2021-09-13 PROCEDURE — 1200000000 HC SEMI PRIVATE

## 2021-09-13 PROCEDURE — 74176 CT ABD & PELVIS W/O CONTRAST: CPT

## 2021-09-13 PROCEDURE — 99285 EMERGENCY DEPT VISIT HI MDM: CPT

## 2021-09-13 PROCEDURE — 83615 LACTATE (LD) (LDH) ENZYME: CPT

## 2021-09-13 PROCEDURE — 6360000002 HC RX W HCPCS: Performed by: STUDENT IN AN ORGANIZED HEALTH CARE EDUCATION/TRAINING PROGRAM

## 2021-09-13 RX ORDER — FUROSEMIDE 10 MG/ML
40 INJECTION INTRAMUSCULAR; INTRAVENOUS ONCE
Status: COMPLETED | OUTPATIENT
Start: 2021-09-13 | End: 2021-09-13

## 2021-09-13 RX ORDER — SODIUM CHLORIDE 0.9 % (FLUSH) 0.9 %
5-40 SYRINGE (ML) INJECTION EVERY 12 HOURS SCHEDULED
Status: DISCONTINUED | OUTPATIENT
Start: 2021-09-13 | End: 2021-09-15 | Stop reason: HOSPADM

## 2021-09-13 RX ORDER — SPIRONOLACTONE 50 MG/1
100 TABLET, FILM COATED ORAL DAILY
Status: DISCONTINUED | OUTPATIENT
Start: 2021-09-13 | End: 2021-09-15 | Stop reason: HOSPADM

## 2021-09-13 RX ORDER — DULOXETIN HYDROCHLORIDE 20 MG/1
20 CAPSULE, DELAYED RELEASE ORAL DAILY
Status: DISCONTINUED | OUTPATIENT
Start: 2021-09-13 | End: 2021-09-15 | Stop reason: HOSPADM

## 2021-09-13 RX ORDER — VITAMIN B COMPLEX
1 CAPSULE ORAL DAILY
COMMUNITY
End: 2022-06-21

## 2021-09-13 RX ORDER — ONDANSETRON 4 MG/1
4 TABLET, ORALLY DISINTEGRATING ORAL EVERY 8 HOURS PRN
Status: DISCONTINUED | OUTPATIENT
Start: 2021-09-13 | End: 2021-09-15 | Stop reason: HOSPADM

## 2021-09-13 RX ORDER — POLYETHYLENE GLYCOL 3350 17 G/17G
17 POWDER, FOR SOLUTION ORAL DAILY PRN
Status: DISCONTINUED | OUTPATIENT
Start: 2021-09-13 | End: 2021-09-15 | Stop reason: HOSPADM

## 2021-09-13 RX ORDER — ZINC GLUCONATE 50 MG
50 TABLET ORAL 2 TIMES DAILY
Status: ON HOLD | COMMUNITY
End: 2022-05-26 | Stop reason: HOSPADM

## 2021-09-13 RX ORDER — ACETAMINOPHEN 650 MG/1
650 SUPPOSITORY RECTAL EVERY 6 HOURS PRN
Status: DISCONTINUED | OUTPATIENT
Start: 2021-09-13 | End: 2021-09-15 | Stop reason: HOSPADM

## 2021-09-13 RX ORDER — ACETAMINOPHEN 325 MG/1
650 TABLET ORAL EVERY 6 HOURS PRN
Status: DISCONTINUED | OUTPATIENT
Start: 2021-09-13 | End: 2021-09-15 | Stop reason: HOSPADM

## 2021-09-13 RX ORDER — ASPIRIN 81 MG/1
324 TABLET, CHEWABLE ORAL ONCE
Status: COMPLETED | OUTPATIENT
Start: 2021-09-13 | End: 2021-09-13

## 2021-09-13 RX ORDER — ALBUTEROL SULFATE 90 UG/1
2 AEROSOL, METERED RESPIRATORY (INHALATION) 4 TIMES DAILY PRN
Status: DISCONTINUED | OUTPATIENT
Start: 2021-09-13 | End: 2021-09-15 | Stop reason: HOSPADM

## 2021-09-13 RX ORDER — LEVOTHYROXINE SODIUM 0.03 MG/1
25 TABLET ORAL DAILY
Status: DISCONTINUED | OUTPATIENT
Start: 2021-09-13 | End: 2021-09-15 | Stop reason: HOSPADM

## 2021-09-13 RX ORDER — SODIUM CHLORIDE 9 MG/ML
25 INJECTION, SOLUTION INTRAVENOUS PRN
Status: DISCONTINUED | OUTPATIENT
Start: 2021-09-13 | End: 2021-09-15 | Stop reason: HOSPADM

## 2021-09-13 RX ORDER — LACTULOSE 10 G/15ML
10 SOLUTION ORAL DAILY
Status: DISCONTINUED | OUTPATIENT
Start: 2021-09-13 | End: 2021-09-15 | Stop reason: HOSPADM

## 2021-09-13 RX ORDER — SODIUM CHLORIDE 0.9 % (FLUSH) 0.9 %
5-40 SYRINGE (ML) INJECTION PRN
Status: DISCONTINUED | OUTPATIENT
Start: 2021-09-13 | End: 2021-09-15 | Stop reason: HOSPADM

## 2021-09-13 RX ORDER — ALBUMIN (HUMAN) 12.5 G/50ML
100 SOLUTION INTRAVENOUS ONCE
Status: COMPLETED | OUTPATIENT
Start: 2021-09-13 | End: 2021-09-13

## 2021-09-13 RX ORDER — NADOLOL 20 MG/1
40 TABLET ORAL DAILY
Status: DISCONTINUED | OUTPATIENT
Start: 2021-09-13 | End: 2021-09-15 | Stop reason: HOSPADM

## 2021-09-13 RX ORDER — ONDANSETRON 2 MG/ML
4 INJECTION INTRAMUSCULAR; INTRAVENOUS EVERY 6 HOURS PRN
Status: DISCONTINUED | OUTPATIENT
Start: 2021-09-13 | End: 2021-09-15 | Stop reason: HOSPADM

## 2021-09-13 RX ORDER — FUROSEMIDE 10 MG/ML
40 INJECTION INTRAMUSCULAR; INTRAVENOUS 2 TIMES DAILY
Status: DISCONTINUED | OUTPATIENT
Start: 2021-09-13 | End: 2021-09-15 | Stop reason: HOSPADM

## 2021-09-13 RX ADMIN — LEVOTHYROXINE SODIUM 25 MCG: 25 TABLET ORAL at 20:56

## 2021-09-13 RX ADMIN — ALBUMIN (HUMAN) 100 G: 0.25 INJECTION, SOLUTION INTRAVENOUS at 16:00

## 2021-09-13 RX ADMIN — LACTULOSE 10 G: 10 SOLUTION ORAL at 20:54

## 2021-09-13 RX ADMIN — DULOXETINE HYDROCHLORIDE 20 MG: 20 CAPSULE, DELAYED RELEASE ORAL at 20:55

## 2021-09-13 RX ADMIN — SPIRONOLACTONE 100 MG: 50 TABLET ORAL at 21:00

## 2021-09-13 RX ADMIN — SODIUM CHLORIDE, PRESERVATIVE FREE 10 ML: 5 INJECTION INTRAVENOUS at 20:59

## 2021-09-13 RX ADMIN — ASPIRIN 324 MG: 81 TABLET, CHEWABLE ORAL at 11:50

## 2021-09-13 RX ADMIN — FUROSEMIDE 40 MG: 10 INJECTION, SOLUTION INTRAVENOUS at 09:55

## 2021-09-13 ASSESSMENT — PAIN SCALES - GENERAL: PAINLEVEL_OUTOF10: 0

## 2021-09-13 ASSESSMENT — ENCOUNTER SYMPTOMS
CONSTIPATION: 0
CHOKING: 0
COUGH: 0
CHEST TIGHTNESS: 0
VOMITING: 0
NAUSEA: 0
ABDOMINAL DISTENTION: 1
EYES NEGATIVE: 1
DIARRHEA: 0
SHORTNESS OF BREATH: 1
BLOOD IN STOOL: 0

## 2021-09-13 NOTE — ED NOTES
Spoke with Delores in Ultrasound at this time. To put patient on for transport to IR when ultrasound of leg completed.  IR is aware as well     Rasheed Phillip RN  09/13/21 2242

## 2021-09-13 NOTE — PROGRESS NOTES
Medication History  [facility]    Patient Name: Luke Givens 1945     Medication history has been completed by: Andrew Brito CPhT    Source(s) of information: patient, wife and insurance claims     Primary Care Physician: Jaspreet Cain MD     Pharmacy: [facility]    Allergies as of 09/13/2021 - Fully Reviewed 09/13/2021   Allergen Reaction Noted    Lisinopril Swelling     Zetia [ezetimibe] Swelling     Atorvastatin      Codeine Other (See Comments) 12/27/2018    Hydrochlorothiazide      Hydroxyzine      Lexapro [escitalopram oxalate]      Pravastatin          Prior to Admission medications    Medication Sig Start Date End Date Taking? Authorizing Provider   zinc gluconate 50 MG tablet Take 50 mg by mouth 2 times daily   Yes Historical Provider, MD   b complex vitamins capsule Take 1 capsule by mouth daily   Yes Historical Provider, MD   furosemide (LASIX) 40 MG tablet TAKE 1 tab am and pm  Patient taking differently: Take 40 mg by mouth 2 times daily TAKE 1 tab am and pm 8/30/21 4/15/22 Yes Pradeep Benjamin MD   spironolactone (ALDACTONE) 50 MG tablet Take 2 tablets by mouth daily 8/30/21 2/26/22 Yes Pradeep Benjamin MD   albuterol sulfate HFA (VENTOLIN HFA) 108 (90 Base) MCG/ACT inhaler Inhale 2 puffs into the lungs 4 times daily as needed for Wheezing 8/30/21  Yes Pradeep Benjamin MD   Testosterone Cypionate 200 MG/ML KIT Inject into the muscle every 14 days.     Yes Historical Provider, MD   tadalafil (CIALIS) 20 MG tablet Take 20 mg by mouth every 3 days    Yes Historical Provider, MD   celecoxib (CELEBREX) 200 MG capsule Take 1 capsule by mouth daily 8/9/21  Yes Pradeep Benjamin MD   DULoxetine (CYMBALTA) 60 MG extended release capsule TAKE 1 CAPSULE BY MOUTH ONCE DAILY 5/7/21 12/6/21 Yes Pradeep Benjamin MD   levothyroxine (SYNTHROID) 25 MCG tablet Take 1 tablet by mouth Daily 5/7/21  Yes Pradeep Benjamin MD   nadolol (CORGARD) 40 MG tablet Take 1 tablet by mouth daily 5/7/21  Yes An Moon MD   vitamin E 400 UNIT capsule Take 800 Units by mouth daily   Yes Historical Provider, MD   magnesium oxide (MAG-OX) 400 MG tablet Take 200 mg by mouth 2 times daily OTC 10/15/19  Yes Historical Provider, MD   Multiple Vitamin (MULTI VITAMIN MENS PO) Take 1 tablet by mouth daily   Yes Historical Provider, MD   ESOMEPRAZOLE MAGNESIUM PO Take 20 mg by mouth daily OTC   Yes Historical Provider, MD   [place]-3 1000 MG CAPS Take 1 capsule by mouth daily   Yes Historical Provider, MD   Lactulose (CONSTULOSE PO) Take by mouth    Historical Provider, MD     Medications added or changed (ex. new medication, dosage change, interval change, formulation change):  Zinc 50 mg BID (added)  B complex daily (added)    Medications removed from list (include reason, ex. noncompliance, medication cost, therapy complete etc.):   Liver MD no longer taking  Mind Tech no longer taking  Size genix no longer taking  Tizanidine no longer taking    Comments:  Medication list reviewed with patient and insurance claims verified. Per PCP note 08/30/21 patient was to increase his furosemide to BID and spironolactone to 2 tablets daily. Patient with recently started on cialis 20 mg every 3 days and testosterone injection every 2 eeks. Patient reports last dose of cialis 09/12/21 and testosterone injection 09/10/21. Patient reports he has taken no meds piror to ER visit.     To my knowledge the above medication history is accurate as of 9/13/2021 1:00 PM.   Nba Aguero CPhT   9/13/2021 1:00 PM

## 2021-09-13 NOTE — ED PROVIDER NOTES
EMERGENCY DEPARTMENT ENCOUNTER      CHIEF COMPLAINT    Chief Complaint   Patient presents with    Shortness of Breath     weakness       HPI    Lucy Peña is a 70 y.o. male with history significant for GERD, hypertension, hypothyroidism, alcoholic cirrhosis, heart failure with preserved ejection fraction who presents with generalized weakness shortness of breath leg swelling. Patient's been having weeks of generalized weakness exertional shortness of breath and leg swelling stated I put on about 40 pounds over last year, no recent medication changes, last echocardiogram reviewed was 2 years ago had demonstrated grade 2 diastolic dysfunction. Patient denies any actual chest pain, denies any pressure but does have exertional shortness of breath and also orthopnea. Denies any cough       REVIEW OF SYSTEMS    Constitutional: Denies chills, fatigue, unexpected weight loss or fever. HENT: Denies sore throat or rhinorrhea. Eyes: Denies vision changes. Respiratory: Ornis of breath no cough  Cardiovascular: Denies chest pain,+ leg swelling  Gastrointestinal: Denies abdominal pain, diarrhea, nausea and vomiting. + Abdominal wall swelling  Genitourinary: Denies dysuria or hematuria. Skin: Denies rashes or wounds. MSK: Denies joint pain. Neurologic: Denies headache, lightheadedness, numbness, or weakness.    Hematologic/lymphatic: Denies unexpected weight loss, night sweats  Endocrine: No polyuria, polydipsia, or polyphagia      Pertinent positives and negatives are delineated in HPI and ROS above, all other systems are reviewed and are negative    PAST MEDICAL HISTORY    Past Medical History:   Diagnosis Date    GERD (gastroesophageal reflux disease) 2000    Hypertension      Medical history reviewed and no pertinent past medical history other than the ones mentioned above    SURGICAL HISTORY    Past Surgical History:   Procedure Laterality Date    CHOLECYSTECTOMY      VASECTOMY       Surgical history reviewed and no pertinent surgical history other than the ones mentioned above    CURRENT MEDICATIONS    Current Outpatient Rx   Medication Sig Dispense Refill    furosemide (LASIX) 40 MG tablet TAKE 1 tab am and pm 180 tablet 1    spironolactone (ALDACTONE) 50 MG tablet Take 2 tablets by mouth daily 90 tablet 1    albuterol sulfate HFA (VENTOLIN HFA) 108 (90 Base) MCG/ACT inhaler Inhale 2 puffs into the lungs 4 times daily as needed for Wheezing 1 Inhaler 0    Testosterone Cypionate 200 MG/ML KIT Inject into the muscle.       tadalafil (CIALIS) 20 MG tablet Take 20 mg by mouth as needed for Erectile Dysfunction      celecoxib (CELEBREX) 200 MG capsule Take 1 capsule by mouth daily 30 capsule 1    DULoxetine (CYMBALTA) 60 MG extended release capsule TAKE 1 CAPSULE BY MOUTH ONCE DAILY 90 capsule 1    levothyroxine (SYNTHROID) 25 MCG tablet Take 1 tablet by mouth Daily 90 tablet 1    nadolol (CORGARD) 40 MG tablet Take 1 tablet by mouth daily 90 tablet 1    tiZANidine (ZANAFLEX) 2 MG tablet Take 1 tablet by mouth every 8 hours as needed (muscle spasms) (Patient not taking: Reported on 8/30/2021) 30 tablet 0    NONFORMULARY 1 capsule LiverMD      NONFORMULARY 1 capsule MIND tech      NONFORMULARY 1 capsule SizeGenix      vitamin E 400 UNIT capsule Take 800 Units by mouth daily      magnesium oxide (MAG-OX) 400 MG tablet TAKE 1 2 (ONE HALF) TABLET BY MOUTH TWICE DAILY  1    Lactulose (CONSTULOSE PO) Take by mouth      Multiple Vitamin (MULTI VITAMIN MENS PO) Take 1 tablet by mouth daily      esomeprazole Magnesium (NEXIUM) 20 MG PACK Take 20 mg by mouth daily      Omega-3 1000 MG CAPS Take 1 capsule by mouth daily       Medication is reviewed    ALLERGIES    Allergies   Allergen Reactions    Lisinopril Swelling     Tongue swelling      Zetia [Ezetimibe] Swelling     Facial swelling    Atorvastatin     Codeine Other (See Comments)     Blood pressure drops    Hydrochlorothiazide     Hydroxyzine Fatigue and depressed    Lexapro [Escitalopram Oxalate]      Increased depression    Pravastatin      Allergy is reviewed    FAMILY HISTORY    Family History   Problem Relation Age of Onset    Hypertension Brother     Diabetes Other      Family history reviewed and no pertinent family history other than the ones mentioned above    SOCIAL HISTORY    Social History     Socioeconomic History    Marital status:      Spouse name: Not on file    Number of children: Not on file    Years of education: Not on file    Highest education level: Not on file   Occupational History     Comment: Retired    Tobacco Use    Smoking status: Never Smoker    Smokeless tobacco: Never Used   Substance and Sexual Activity    Alcohol use: Yes     Comment: drink 1/3 liter of whiskey everyday    Drug use: Never    Sexual activity: Yes     Partners: Female   Other Topics Concern    Not on file   Social History Narrative    Not on file     Social Determinants of Health     Financial Resource Strain:     Difficulty of Paying Living Expenses:    Food Insecurity:     Worried About [address] in the Last Year:     [address] in the Last Year:    Transportation Needs:     Lack of Transportation (Medical):      Lack of Transportation (Non-Medical):    Physical Activity:     Days of Exercise per Week:     Minutes of Exercise per Session:    Stress:     Feeling of Stress :    Social Connections:     Frequency of Communication with Friends and Family:     Frequency of Social Gatherings with Friends and Family:     Attends Zoroastrian Services:     Active Member of Clubs or Organizations:     Attends Club or Organization Meetings:     Marital Status:    Intimate Partner Violence:     Fear of Current or Ex-Partner:     Emotionally Abused:     Physically Abused:     Sexually Abused:      Live with wife  Alcohol and recreational drug use: Denies  Social history reviewed and no pertinent social history other than the ones mentioned above    PHYSICAL EXAM    Vital Signs:BP (!) 170/84   Pulse 70   Temp 97.9 °F (36.6 °C) (Oral)   Resp 19   Ht 5' 8\" (1.727 m)   Wt (!) 330 lb (149.7 kg)   SpO2 100%   BMI 50.18 kg/m²   I have reviewed the triage vital signs. Constitutional: Well nourished, well developed, appears stated age  Eyes: PERRL, no conjunctival injection  HENT: NCAT, Neck supple without meningismus   CV: RRR, Warm, 2+ pitting edema with abdominal wall edema, anasarca  RESP: Normal RR, no increased respiratory efforts, no respiratory distress, no crackles  GI: soft, non-distended  MSK: No gross deformities  Skin: Warm, dry. No rashes  Neuro: Alert, CNs II-XII grossly intact. Moving all 4 extremities  Psych: Appropriate mood and affect. Labs:   Labs Reviewed   CBC WITH AUTO DIFFERENTIAL - Abnormal; Notable for the following components:       Result Value    RBC 3.76 (*)     Hemoglobin 12.0 (*)     Hematocrit 37.8 (*)     .5 (*)     MCH 31.9 (*)     MCHC 31.7 (*)     RDW 15.0 (*)     Segs Relative 69.8 (*)     Lymphocytes % 9.4 (*)     Monocytes % 12.1 (*)     Eosinophils % 7.0 (*)     Basophils % 1.5 (*)     All other components within normal limits   TROPONIN - Abnormal; Notable for the following components:    Troponin T 0.013 (*)     All other components within normal limits   COMPREHENSIVE METABOLIC PANEL W/ REFLEX TO MG FOR LOW K   BRAIN NATRIURETIC PEPTIDE   URINE RT REFLEX TO CULTURE   LIPASE       Radiology:  XR CHEST PORTABLE   Final Result   No acute cardiopulmonary abnormality.              I directly reviewed the images and radiology interpretation    EKG interpreted by myself: See ED course    ED COURSE  Assessment & Medical Decision Making:  Brianne Mohr is a 70 y.o. male who presents with shortness of breath and worsening swelling, patient does have cirrhosis as well as heart failure could be exacerbation of volume overload from heart failure versus decompensated cirrhosis, obtain basic lab patient does have elevated troponin which likely demand ischemia patient not have any chest pain or any significant EKG abnormalities, chest x-ray did not demonstrate any pulmonary edema however patient is grossly volume overloaded with associated NSTEMI, patient will require admission for continued management, patient was given 40 mg IV Lasix. ED Course as of Sep 13 1040   Mon Sep 13, 2021   1422 EKG demonstrated normal sinus rhythm with no significant interval abnormalities, no ischemic changes. [YQ]      ED Course User Index  [YQ] Ian Tillman MD        DDx includes but not limited to:  PNA, PE, HF exacerbation, volume overload from cirrhosis, volume overload from renal dysfunction, anemia, CNS, acidosis, ACS, anxiety, PTX, pleural effusion, bronchiectasis, airway obstruction, reactive airway disease exacerbation (asthma/COPD/WARI), anaphylaxis/anaphylactoid reaction/allergic reaction      Workup includes but not limited to: Labs, EKG, chest x-ray    Treatment includes but not limited to: Lasix, aspirin    Critical care time: NA    Impression:   1. Anasarca  2. Volume overload  3. NSTEMI    Disposition: Admission    This note dictated using Dragon medical voice recognition software. Attempts at proofreading were made, but errors may occasionally still occur.            Ian Tillman MD  09/13/21 8010

## 2021-09-13 NOTE — PROGRESS NOTES
PROCEDURE PERFORMED: Paracentesis    PRIMARY INDICATION FOR PROCEDURE: Ascities    INFORMED CONSENT:  Obtained prior to procedure. Consent placed in chart. JOVITA SCORE PRE PROCEDURE:  10    PT TRANSPORTED FROM:  32 TO THE IR ROOM: Small      PT IN THE ROOM AT WHAT TIME: 5700    ASSESSMENT:Patient is alert and oriented and able to transfer on own. Skin unremarkable. VVS. Abdomen very distended and taunt. TIME OUT COMPLETE:     BARRIER PRECAUTIONS & STERILE TECHNIQUE:               Pt transferred to the table and positioned for comfort. Warm blankets given. Pt placed on Cardiac Monitor. Pt prepped and draped in a sterile fashion with chlorhexadine. PAIN/LOCAL ANESTHESIA/SEDATION MANAGEMENT:           Local: Lidocaine given by Dr Kelly Pink          Sedation: none given           INTRAOPERATIVE:           ACCESS TIME: 8549        US/FLUORO: 5 US images    Handoff and report given to Rhonda Mathews RN @ 8171. Handoff and report received by Rufino Landa RN @ 3461. A total of 08569 cc of yellow cloudy peritoneal fluid removed and 100 grams of Albumin given.       STERILE DRESSINGS: 4x4 and tegaderm    SPECIMENS: 1000cc of Yellow cloudy peritoneal fluid    EBL: <1ml    FOLLOW- UP X-RAY: none ordered    COMPLICATIONS/ OUTCOME: none    STAFF PRESENT DURING PROCEDURE: Dr Kelly Pink, Phill Joya, Talbot Gamble, [place], Veatrice Basques, Ladonna [place] SCORE POST PROCEDURE: 10    REPORT CALLED TO: ED 32    PT LEFT ROOM AT WHAT TIME: 1600

## 2021-09-13 NOTE — H&P
History and Physical      Name:  Lucy Peña /Age/Sex: 1945  (70 y.o. male)   MRN & CSN:  [id number] & [id number] Admission Date/Time: 2021  9:10 AM   Location:  [place] [address] PCP: Nora Orellana MD       Hospital Day: 1    Assessment and Plan:   Lucy Peña is a 70 y.o.  male  who presents with shortness of breath  Assessment and plan:  · Shortness of breath most likely secondary to ascites with history of alcoholic cirrhosis esophageal varices  · Abdominal distention  · Hx alcoholic cirrhosis of liver with ascites:  · Hx alcoholic Cirrhosis: Quit drinking 2 weeks ago, used to drink beer 7 to 9 cans/day  · Weight gain of 60 pounds in 3 to 4 months  · Currently taking Lasix and spironolactone  · proBNP 625.6  · D-dimer elevated, less suspicious for PE  · Stopped drinking 2 weeks ago, still drinks 7 to 9 cans of beer    · Elevated troponin likely secondary to demand ischemia  · Trop 0.013  · EKG normal sinus rhythm, , heart rate 66    · Bilateral lower extremity swelling likely hypoalbuminemia/alcoholic cirrhosis  3+ pitting edema left more than right  Left leg redness, denies pain, warmth  Negative for DVT bilateral lower extremity   Albumin 2.7    Chronic Conditions: Continue home medications unless contraindicated  · Essential hypertension: BP soft  · Hypothyroidism: Continue Synthroid  · JENNIFER: Uses CPAP,not using for the last 2 weeks since the mask was broken  · Obesity: BMI 50.18 kg/M squared  · Anxiety: Continue Cymbalta      Plan:  · Continue Lasix 40 mg twice daily  · Trend troponins every 6 hours x2  · Initiated aspirin 324x1 in ED  · inpatient radiology consulted for ascites  · Pending fluid analysis-albumin, amylase, body fluid cell count, culture, cytology, glucose LDH, protein  · If fluid drains more than 5 L, will order albumin 25% post paracentesis  · N.p.o. with sips of water and meds  · GI consulted:  For ascites secondary hx alcoholic cirrhosis  · Intake output chart, daily weight recording  · CBC and BMP  · Continue lactulose 10 mg oral daily and spironolactone  · Respiratory care evaluation, home CPAP at night  · Blood culture pending, will start ceftriaxone 1 g IV every 24 hours for SBP prophylaxis    Discussed assessment and treatment plan with the admitting and supervising physician who agrees with the plan below. Diet Diet NPO Exceptions are: Sips of Water with Meds   DVT Prophylaxis [] Lovenox, []  Heparin, [] SCDs, [] Ambulation  Hold DVT prophylaxis for paracentesis   GI Prophylaxis [x] PPI,  [] H2 Blocker,  [] Carafate,  [] Diet/Tube Feeds   Code Status  full code     History of Present Illness:     Chief Complaint: SOB (shortness of breath)  Erick Shipley is a 70 y.o.  male 3 of alcoholic cirrhosis, HLD, HTN, esophageal varices, portal hypertension, JENNIFER, GERD who presents with shortness of breath. Patient presents with ongoing shortness of breath for approximately 1 to 2 months. For the last 2 to 3 weeks patient reports worsening shortness of breath. Reports weakness of bilateral lower extremities and weight gain more than 60 pounds in 3 to 4 months. He can walk only 100 feet without getting short of breath. Unable to lay flat, has been sleeping in a recliner chair for last 2 weeks. Denies chest pain/chest pressure, palpitation. Does have history of alcoholic cirrhosis. He reports abdominal tightness/distention, denies abdominal pain, fever. He quit drinking 2 weeks ago. Used to drink 7 to 9 cans 12 ounce beer. He admit having decreased appetite, dry heaves. He has been taking testosterone every 2 weeks for 2 months. At the time of evaluation, patient alert oriented x3. Admits S OB, currently on room air. Abdomen tight and distended. Denies chest pain. Vital signs stable. ED findings: Trop 0.013, , BUN 15, CR 1.0, , WBC 8.1 .5, MCHC 31.7, x-ray nonacute.      Review of Systems   Constitutional: Positive for appetite change and unexpected weight change. Negative for fatigue and fever. HENT: Negative. Eyes: Negative. Respiratory: Positive for shortness of breath. Negative for cough, choking and chest tightness. Cardiovascular: Positive for leg swelling. Gastrointestinal: Positive for abdominal distention. Negative for blood in stool, constipation, diarrhea, nausea and vomiting. Dry heaves   Genitourinary: Positive for urgency. Negative for dysuria and hematuria. Musculoskeletal: Positive for gait problem. Skin: Positive for rash. Negative for wound. Neurological: Positive for weakness. Negative for dizziness and seizures. Psychiatric/Behavioral: The patient is not nervous/anxious. Ten point ROS reviewed negative, unless as noted above    Objective: Intake/Output Summary (Last 24 hours) at 9/13/2021 1258  Last data filed at 9/13/2021 1150  Gross per 24 hour   Intake --   Output 850 ml   Net -850 ml      Vitals:   Vitals:    09/13/21 1151   BP: 124/74   Pulse: 62   Resp: 16   Temp:  97.9   SpO2: 95%     Physical Exam:   Physical Exam  Constitutional:       General: He is not in acute distress. Appearance: Normal appearance. He is obese. He is not ill-appearing. HENT:      Head: Normocephalic and atraumatic. Nose: Nose normal.      Mouth/Throat:      Mouth: Mucous membranes are moist.      Pharynx: Oropharynx is clear. Eyes:      Extraocular Movements: Extraocular movements intact. Conjunctiva/sclera: Conjunctivae normal.   Cardiovascular:      Rate and Rhythm: Normal rate and regular rhythm. Pulses: Normal pulses. Heart sounds: Normal heart sounds. No murmur heard. No friction rub. No gallop. Pulmonary:      Effort: Pulmonary effort is normal.      Breath sounds: Decreased breath sounds present. Abdominal:      General: Bowel sounds are normal. There is distension. Tenderness: There is no abdominal tenderness. There is no guarding or rebound. Musculoskeletal:      Right lower leg: Edema present. Left lower leg: Edema present. Skin:     Capillary Refill: Capillary refill takes 2 to 3 seconds. Findings: Erythema and rash present. Comments: TEENA/L LEedema   Neurological:      General: No focal deficit present. Mental Status: He is alert and oriented to person, place, and time. Mental status is at baseline. Psychiatric:         Mood and Affect: Mood normal.         Behavior: Behavior normal.         Left Leg     Past Medical History:      Past Medical History:   Diagnosis Date    GERD (gastroesophageal reflux disease) 2000    Hypertension      PSHX:  has a past surgical history that includes Cholecystectomy and Vasectomy. Allergies: Allergies   Allergen Reactions    Lisinopril Swelling     Tongue swelling      Zetia [Ezetimibe] Swelling     Facial swelling    Atorvastatin     Codeine Other (See Comments)     Blood pressure drops    Hydrochlorothiazide     Hydroxyzine      Fatigue and depressed    Lexapro [Escitalopram Oxalate]      Increased depression    Pravastatin        FAM HX: family history includes Diabetes in an other family member; Hypertension in his brother.   Soc HX:   Social History     Socioeconomic History    Marital status:      Spouse name: Not on file    Number of children: Not on file    Years of education: Not on file    Highest education level: Not on file   Occupational History     Comment: Retired    Tobacco Use    Smoking status: Never Smoker    Smokeless tobacco: Never Used   Substance and Sexual Activity    Alcohol use: Yes     Comment: drink 1/3 liter of whiskey everyday    Drug use: Never    Sexual activity: Yes     Partners: Female   Other Topics Concern    Not on file   Social History Narrative    Not on file     Social Determinants of Health     Financial Resource Strain:     Difficulty of Paying Living Expenses:    Food Insecurity:     Worried About Running Out 1    vitamin E 400 UNIT capsule Take 800 Units by mouth daily      magnesium oxide (MAG-OX) 400 MG tablet Take 200 mg by mouth 2 times daily OTC  1    Multiple Vitamin (MULTI VITAMIN MENS PO) Take 1 tablet by mouth daily      ESOMEPRAZOLE MAGNESIUM PO Take 20 mg by mouth daily OTC      Omega-3 1000 MG CAPS Take 1 capsule by mouth daily      Lactulose (CONSTULOSE PO) Take by mouth         Infusions:   PRN Meds:   CT ABDOMEN PELVIS WO CONTRAST Additional Contrast? None  Result Date: 9/13/2021  1. Large amount of ascites. 2. Cirrhotic liver. 3. 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 XR CHEST PORTABLE  Result Date: 9/13/2021  No acute cardiopulmonary abnormality. VL DUP LOWER EXTREMITY VENOUS BILATERAL  Result Date: 9/13/2021  No evidence of DVT in either lower extremity.      Electronically signed by MOHINDER Swartz on 9/13/2021 at 12:58 PM

## 2021-09-14 ENCOUNTER — APPOINTMENT (OUTPATIENT)
Dept: CT IMAGING | Age: 76
DRG: 433 | End: 2021-09-14
Payer: MEDICARE

## 2021-09-14 LAB
ANION GAP SERPL CALCULATED.3IONS-SCNC: 9 MMOL/L (ref 4–16)
BUN BLDV-MCNC: 13 MG/DL (ref 6–23)
CALCIUM SERPL-MCNC: 9.6 MG/DL (ref 8.3–10.6)
CHLORIDE BLD-SCNC: 103 MMOL/L (ref 99–110)
CO2: 30 MMOL/L (ref 21–32)
CREAT SERPL-MCNC: 0.7 MG/DL (ref 0.9–1.3)
GFR AFRICAN AMERICAN: >60 ML/MIN/1.73M2
GFR NON-AFRICAN AMERICAN: >60 ML/MIN/1.73M2
GLUCOSE BLD-MCNC: 101 MG/DL (ref 70–99)
HBV SURFACE AB TITR SER: <3.5 {TITER}
HCT VFR BLD CALC: 35.4 % (ref 42–52)
HEMOGLOBIN: 11.3 GM/DL (ref 13.5–18)
LV EF: 53 %
LVEF MODALITY: NORMAL
MCH RBC QN AUTO: 31.8 PG (ref 27–31)
MCHC RBC AUTO-ENTMCNC: 31.9 % (ref 32–36)
MCV RBC AUTO: 99.7 FL (ref 78–100)
PDW BLD-RTO: 14.9 % (ref 11.7–14.9)
PLATELET # BLD: 172 K/CU MM (ref 140–440)
PMV BLD AUTO: 11.3 FL (ref 7.5–11.1)
POTASSIUM SERPL-SCNC: 3.9 MMOL/L (ref 3.5–5.1)
PROCALCITONIN: 0.08
RBC # BLD: 3.55 M/CU MM (ref 4.6–6.2)
SODIUM BLD-SCNC: 142 MMOL/L (ref 135–145)
TROPONIN T: 0.03 NG/ML
WBC # BLD: 8.3 K/CU MM (ref 4–10.5)

## 2021-09-14 PROCEDURE — 84484 ASSAY OF TROPONIN QUANT: CPT

## 2021-09-14 PROCEDURE — 80048 BASIC METABOLIC PNL TOTAL CA: CPT

## 2021-09-14 PROCEDURE — 84145 PROCALCITONIN (PCT): CPT

## 2021-09-14 PROCEDURE — 6360000002 HC RX W HCPCS: Performed by: CLINICAL NURSE SPECIALIST

## 2021-09-14 PROCEDURE — 2580000003 HC RX 258: Performed by: CLINICAL NURSE SPECIALIST

## 2021-09-14 PROCEDURE — 71260 CT THORAX DX C+: CPT

## 2021-09-14 PROCEDURE — 86708 HEPATITIS A ANTIBODY: CPT

## 2021-09-14 PROCEDURE — 86706 HEP B SURFACE ANTIBODY: CPT

## 2021-09-14 PROCEDURE — 6370000000 HC RX 637 (ALT 250 FOR IP): Performed by: CLINICAL NURSE SPECIALIST

## 2021-09-14 PROCEDURE — 1200000000 HC SEMI PRIVATE

## 2021-09-14 PROCEDURE — 82105 ALPHA-FETOPROTEIN SERUM: CPT

## 2021-09-14 PROCEDURE — 83516 IMMUNOASSAY NONANTIBODY: CPT

## 2021-09-14 PROCEDURE — 86256 FLUORESCENT ANTIBODY TITER: CPT

## 2021-09-14 PROCEDURE — 85027 COMPLETE CBC AUTOMATED: CPT

## 2021-09-14 PROCEDURE — 94761 N-INVAS EAR/PLS OXIMETRY MLT: CPT

## 2021-09-14 PROCEDURE — 86038 ANTINUCLEAR ANTIBODIES: CPT

## 2021-09-14 PROCEDURE — 84165 PROTEIN E-PHORESIS SERUM: CPT

## 2021-09-14 PROCEDURE — 6360000004 HC RX CONTRAST MEDICATION: Performed by: PHYSICIAN ASSISTANT

## 2021-09-14 PROCEDURE — 93306 TTE W/DOPPLER COMPLETE: CPT

## 2021-09-14 RX ADMIN — FUROSEMIDE 40 MG: 10 INJECTION, SOLUTION INTRAMUSCULAR; INTRAVENOUS at 18:59

## 2021-09-14 RX ADMIN — LEVOTHYROXINE SODIUM 25 MCG: 25 TABLET ORAL at 11:57

## 2021-09-14 RX ADMIN — LACTULOSE 10 G: 10 SOLUTION ORAL at 11:59

## 2021-09-14 RX ADMIN — DULOXETINE HYDROCHLORIDE 20 MG: 20 CAPSULE, DELAYED RELEASE ORAL at 11:57

## 2021-09-14 RX ADMIN — SODIUM CHLORIDE, PRESERVATIVE FREE 10 ML: 5 INJECTION INTRAVENOUS at 20:02

## 2021-09-14 RX ADMIN — FUROSEMIDE 40 MG: 10 INJECTION, SOLUTION INTRAMUSCULAR; INTRAVENOUS at 11:59

## 2021-09-14 RX ADMIN — NADOLOL 40 MG: 20 TABLET ORAL at 11:58

## 2021-09-14 RX ADMIN — SPIRONOLACTONE 100 MG: 50 TABLET ORAL at 11:57

## 2021-09-14 RX ADMIN — IOPAMIDOL 90 ML: 755 INJECTION, SOLUTION INTRAVENOUS at 11:48

## 2021-09-14 ASSESSMENT — PAIN SCALES - GENERAL
PAINLEVEL_OUTOF10: 0

## 2021-09-14 NOTE — PROGRESS NOTES
GI consult received- full note to follow in am  Impression:    1) decompensated cirrhosis    2) ascites aggravated by high dietary sodium intake        Plan:   1) recommendations to follow in am-- as peritoneal fluid neutrophil count is low, can D/C Rocephin now

## 2021-09-14 NOTE — PROGRESS NOTES
Hospitalist Progress Note      Name:  Devon Arroyo /Age/Sex: 1945  (70 y.o. male)   MRN & CSN:  [id number] & [id number] Admission Date/Time: 2021  9:10 AM   Location:  [id number] PCP: Pamella Arora MD         Hospital Day: 2    Assessment and Plan:   Devon Arroyo is a 70 y.o.  male  who presents with SOB (shortness of breath)    Decompensated cirrhosis   Ascites   -Reports no longer drinking  - s/p paracentesis with 10L of fluid removal, received 100g of albumin   - mildly distended, ?if need repeat paracentesis tomorrow   -GI following, recommend DC Rocephin in setting of low neutrophil count   - continue IV lasix, spironolactone, nadolol, lactulose   -Further cirrhosis work-up ordered by GI, awaiting further recommendations    Elevated troponin  -initial trop 0.013, repeat 0.015   - denies chest pain or anginal equivalent   - echo with EF 50%, grade II diastolic dysfunction, moderate TR, no WMA  - suspect demand ischemia, but given no recent cardiac eval, will consult cardiology      Bilateral LE edema, ?cellulitis   - L>R, slightly erythematous, but not painful or warm, no leukocytosis and afebrile   - DVT study negative   - albumin 2.7, has history of diastolic dysfunction   - improving, continue IV lasix  - Procal pending, may consider antibiotics if not improving   - trial compression stockings     Elevated D-dimer   - D-dimer 1606  - DVT study negative, CT PA with no PE    Hypertension  -Continue IV Lasix, spironolactone    JENNIFER  -Continue CPAP    Hypothyroidism  -Continue Synthroid    Anxiety  -Continue Cymbalta    This patient was seen and examined autonomously  A hospitalist attending physician was available for questions/consultation as needed. Diet ADULT DIET; Regular;  Low Sodium (2 gm)   DVT Prophylaxis [] Lovenox, []  Heparin, [] SCDs, [x] Ambulation   GI Prophylaxis [] PPI,  [] H2 Blocker,  [] Carafate,  [x] Diet/Tube Feeds   Code Status Full Code Disposition Patient requires continued admission due to recurrent ascites          History of Present Illness:     Chief Complaint: SOB (shortness of breath)  Pratik Hatch is a 70 y.o.  male  who presents with ascites. Patient seen and examined at bedside. He states he is feeling much better today and his shortness of breath is greatly improved. He denies any recent chest pain. He has not had a recent cardiology eval.  He does state he has been getting fatigued with exertion. He also reports that his left leg is chronically more swollen than his right and will intermittently get red. He denies any fever or chills. Denies any pain. We discussed this plan of care including staying 1 more night in the hospital for further GI evaluation and he is agreeable. Ten point ROS reviewed negative, unless as noted above    Objective: Intake/Output Summary (Last 24 hours) at 9/14/2021 1518  Last data filed at 9/14/2021 1347  Gross per 24 hour   Intake --   Output 1500 ml   Net -1500 ml      Vitals:   Vitals:    09/14/21 1200   BP: (!) 156/59   Pulse: 73   Resp: 16   Temp:    SpO2:      Physical Exam:     GEN Awake male, sitting upright in bed in no apparent distress. Appears given age. Obese  EYES Pupils are equally round. No scleral erythema, discharge, or conjunctivitis. HENT Mucous membranes are moist.  NECK Supple, no apparent thyromegaly or masses. RESP Clear to auscultation, no wheezes, rales or rhonchi. Respirations even and unlabored on RA. CARDIO/VASC   S1/S2 auscultated. Regular rate without appreciable murmurs, rubs, or gallops. Peripheral pulses equal bilaterally and palpable. Moderate peripheral edema, left greater than right  GI Moderate abdominal distention with fluid wave, no tenderness. Bowel sounds are normoactive.  No costovertebral angle tenderness. Schilling catheter is not present. MSK No gross joint deformities. SKIN Normal coloration, warm, dry.   Mild erythema of the

## 2021-09-15 ENCOUNTER — APPOINTMENT (OUTPATIENT)
Dept: NUCLEAR MEDICINE | Age: 76
DRG: 433 | End: 2021-09-15
Payer: MEDICARE

## 2021-09-15 VITALS
BODY MASS INDEX: 47.74 KG/M2 | TEMPERATURE: 98 F | OXYGEN SATURATION: 99 % | SYSTOLIC BLOOD PRESSURE: 129 MMHG | RESPIRATION RATE: 18 BRPM | WEIGHT: 315 LBS | HEART RATE: 75 BPM | DIASTOLIC BLOOD PRESSURE: 65 MMHG | HEIGHT: 68 IN

## 2021-09-15 LAB
LV EF: 70 %
LVEF MODALITY: NORMAL

## 2021-09-15 PROCEDURE — 6360000002 HC RX W HCPCS: Performed by: CLINICAL NURSE SPECIALIST

## 2021-09-15 PROCEDURE — 99255 IP/OBS CONSLTJ NEW/EST HI 80: CPT | Performed by: SPECIALIST

## 2021-09-15 PROCEDURE — 2580000003 HC RX 258: Performed by: CLINICAL NURSE SPECIALIST

## 2021-09-15 PROCEDURE — 93017 CV STRESS TEST TRACING ONLY: CPT

## 2021-09-15 PROCEDURE — 3430000000 HC RX DIAGNOSTIC RADIOPHARMACEUTICAL: Performed by: INTERNAL MEDICINE

## 2021-09-15 PROCEDURE — 78452 HT MUSCLE IMAGE SPECT MULT: CPT

## 2021-09-15 PROCEDURE — A9500 TC99M SESTAMIBI: HCPCS | Performed by: INTERNAL MEDICINE

## 2021-09-15 PROCEDURE — 6360000002 HC RX W HCPCS: Performed by: INTERNAL MEDICINE

## 2021-09-15 PROCEDURE — 94761 N-INVAS EAR/PLS OXIMETRY MLT: CPT

## 2021-09-15 PROCEDURE — 99223 1ST HOSP IP/OBS HIGH 75: CPT | Performed by: INTERNAL MEDICINE

## 2021-09-15 PROCEDURE — 6370000000 HC RX 637 (ALT 250 FOR IP): Performed by: CLINICAL NURSE SPECIALIST

## 2021-09-15 RX ADMIN — KIT FOR THE PREPARATION OF TECHNETIUM TC99M SESTAMIBI 30 MILLICURIE: 1 INJECTION, POWDER, LYOPHILIZED, FOR SOLUTION PARENTERAL at 10:00

## 2021-09-15 RX ADMIN — KIT FOR THE PREPARATION OF TECHNETIUM TC99M SESTAMIBI 10 MILLICURIE: 1 INJECTION, POWDER, LYOPHILIZED, FOR SOLUTION PARENTERAL at 08:30

## 2021-09-15 RX ADMIN — REGADENOSON 0.4 MG: 0.08 INJECTION, SOLUTION INTRAVENOUS at 10:03

## 2021-09-15 RX ADMIN — DULOXETINE HYDROCHLORIDE 20 MG: 20 CAPSULE, DELAYED RELEASE ORAL at 12:41

## 2021-09-15 RX ADMIN — FUROSEMIDE 40 MG: 10 INJECTION, SOLUTION INTRAMUSCULAR; INTRAVENOUS at 12:41

## 2021-09-15 RX ADMIN — SPIRONOLACTONE 100 MG: 50 TABLET ORAL at 12:41

## 2021-09-15 RX ADMIN — LEVOTHYROXINE SODIUM 25 MCG: 25 TABLET ORAL at 12:41

## 2021-09-15 RX ADMIN — LACTULOSE 10 G: 10 SOLUTION ORAL at 12:41

## 2021-09-15 RX ADMIN — SODIUM CHLORIDE, PRESERVATIVE FREE 10 ML: 5 INJECTION INTRAVENOUS at 12:45

## 2021-09-15 RX ADMIN — NADOLOL 40 MG: 20 TABLET ORAL at 12:41

## 2021-09-15 ASSESSMENT — PAIN SCALES - GENERAL: PAINLEVEL_OUTOF10: 0

## 2021-09-15 NOTE — DISCHARGE SUMMARY
Discharge Summary    Name:  Ori Mondragon /Age/Sex: 1945  (70 y.o. male)   MRN & CSN:  [id number] & [id number] Admission Date/Time: 2021  9:10 AM   Attending:  No att. providers found Discharging Physician: Paulina Maynard PA-C     Hospital Course:   Ori Mondragon is a 70 y.o.  male  who presents with SOB (shortness of breath)     Problems addressed during this hospitalization:      Decompensated cirrhosis   Ascites   -Reports no longer drinking  - s/p paracentesis with 10L of fluid removal, received 100g of albumin   -GI following, recommend DC Rocephin in setting of low neutrophil count   - continue lasix, spironolactone, nadolol, lactulose on discharge  - instructed to follow low sodium diet  - GI recommended close follow-up, BMP every week for the next 4 weeks    Elevated troponin  -initial trop 0.013, repeat 0.015   - denies chest pain or anginal equivalent   - echo with EF 50%, grade II diastolic dysfunction, moderate TR, no WMA  - suspect demand ischemia  - cardiology evaluated, stress test negative      Bilateral LE edema   - L>R, slightly erythematous, but not painful or warm, no leukocytosis and afebrile   - DVT study negative    - albumin 2.7, has history of diastolic dysfunction   - improved with IV lasix   - continue spironolactone, lasix on discharge   - instructed to follow-up with PCP/return to ED if redness worsens or fever develops      Elevated D-dimer   - D-dimer 1606  - DVT study negative, CT PA with no PE     Hypertension  -Continue Lasix, spironolactone     JENNIFER  -Continue CPAP     Hypothyroidism  -Continue Synthroid     Anxiety  -Continue Cymbalta     This patient was seen and examined autonomously  A hospitalist attending physician was available for questions/consultation as needed.      This patient was seen and examined autonomously  A hospitalist attending physician was available for questions/consultation as needed.    The patient expressed appropriate understanding of and agreement with the discharge recommendations, medications, and plan. Consults this admission:  IP CONSULT TO HOSPITALIST  IP CONSULT TO INTERVENTIONAL RADIOLOGY  IP CONSULT TO GI  IP CONSULT TO CARDIOLOGY  IP CONSULT TO CARDIOLOGY  IP CONSULT TO [address]    Discharge Instruction:   Follow up appointments: PCP, GI  Primary care physician:  within 2 weeks    Diet:  low sodium   Activity: activity as tolerated  Disposition: Discharged to:   [x]Home, [][facility], []SNF, []Acute Rehab, []Hospice   Condition on discharge: Stable    Discharge Medications:      [place] Patient   Home Medication Instructions [id number]    Printed on:09/15/21 0977   Medication Information                      albuterol sulfate HFA (VENTOLIN HFA) 108 (90 Base) MCG/ACT inhaler  Inhale 2 puffs into the lungs 4 times daily as needed for Wheezing             b complex vitamins capsule  Take 1 capsule by mouth daily             celecoxib (CELEBREX) 200 MG capsule  Take 1 capsule by mouth daily             DULoxetine (CYMBALTA) 60 MG extended release capsule  TAKE 1 CAPSULE BY MOUTH ONCE DAILY             ESOMEPRAZOLE MAGNESIUM PO  Take 20 mg by mouth daily OTC             furosemide (LASIX) 40 MG tablet  TAKE 1 tab am and pm             Lactulose (CONSTULOSE PO)  Take by mouth             levothyroxine (SYNTHROID) 25 MCG tablet  Take 1 tablet by mouth Daily             magnesium oxide (MAG-OX) 400 MG tablet  Take 200 mg by mouth 2 times daily OTC             Multiple Vitamin (MULTI VITAMIN MENS PO)  Take 1 tablet by mouth daily             nadolol (CORGARD) 40 MG tablet  Take 1 tablet by mouth daily             Omega-3 1000 MG CAPS  Take 1 capsule by mouth daily             spironolactone (ALDACTONE) 50 MG tablet  Take 2 tablets by mouth daily             tadalafil (CIALIS) 20 MG tablet  Take 20 mg by mouth every 3 days              Testosterone Cypionate 200 MG/ML KIT  Inject into the muscle every 14 days.               vitamin E 400 UNIT capsule  Take 800 Units by mouth daily             zinc gluconate 50 MG tablet  Take 50 mg by mouth 2 times daily                 Objective Findings at Discharge:   /62   Pulse 69   Temp 98 °F (36.7 °C) (Oral)   Resp 16   Ht 5' 8\" (1.727 m)   Wt (!) 330 lb (149.7 kg)   SpO2 99%   BMI 50.18 kg/m²            PHYSICAL EXAM   GEN Awake male, sitting upright in bed in no apparent distress. Appears given age. EYES Pupils are equally round. No scleral erythema, discharge, or conjunctivitis. HENT Mucous membranes are moist.   NECK Supple, no apparent thyromegaly or masses. RESP Clear to auscultation, no wheezes, rales or rhonchi. Symmetric chest movement while on room air. CARDIO/VASC S1/S2 auscultated. Regular rate without appreciable murmurs, rubs, or gallops. Peripheral pulses equal bilaterally and palpable. Mild peripheral edema, L>R. GI Abdomen is soft without significant tenderness, masses, or guarding. Mild distention. Bowel sounds are normoactive.  No costovertebral angle tenderness. Schilling catheter is not present. HEME/LYMPH No petechiae or ecchymoses. MSK No gross joint deformities. SKIN Normal coloration, warm, dry. NEURO Cranial nerves appear grossly intact, normal speech, no lateralizing weakness. PSYCH Awake, alert, oriented x 4. Affect appropriate.     BMP/CBC  Recent Labs     09/13/21  0945 09/14/21  0610    142   K 4.2 3.9    103   CO2 23 30   BUN 15 13   CREATININE 1.0 0.7*   WBC 8.1 8.3   HCT 37.8* 35.4*    172       IMAGING:  Lower extremity doppper, CT chest, stress test    Discharge Time of 35 minutes    Electronically signed by Lemuel Borges PA-C on 9/15/2021 at 4:45 PM

## 2021-09-15 NOTE — CONSULTS
CARDIOLOGY CONSULT NOTE   Reason for consultation:  SOB     Referring physician:  No admitting provider for patient encounter. Primary care physician: Shante Jacob MD      Dear  Dr. Maxine Garcia admitting provider for patient encounter. Thanks for the consult. Chief Complaints :  Chief Complaint   Patient presents with    Shortness of Breath     weakness        History of present illness:Boby is a 70 y. o.year old who presents with ongoing shortness of breath dyspnea on exertion reduced exercise capacity he has history of liver cirrhosis with esophageal varices due to history of alcohol use. Cardiology was consulted due to ongoing shortness of breath in spite of taking Lasix and abnormal troponin levels. He says he quit drinking few weeks ago although used to drink 7-10 beers every day. He was noted to be volume overloaded with bilateral lower extremity swelling and ascites. 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 He cannot lie flat denies any chest pressure or pain as such. Echo shows preserved EF with diastolic dysfunction no significant valvular disease CT chest was negative a CT abdomen shows ascites with liver cirrhosis    Past medical history:    has a past medical history of GERD (gastroesophageal reflux disease) and Hypertension. Past surgical history:   has a past surgical history that includes Cholecystectomy and Vasectomy. Social History:   reports that he has never smoked. He has never used smokeless tobacco. He reports current alcohol use. He reports that he does not use drugs.   Family history:   no family history of CAD, STROKE of DM at early age    Allergies   Allergen Reactions    Lisinopril Swelling     Tongue swelling      Zetia [Ezetimibe] Swelling     Facial swelling    Atorvastatin     Codeine Other (See Comments)     Blood pressure drops    Hydrochlorothiazide     Hydroxyzine      Fatigue and depressed    Lexapro [Escitalopram Oxalate]      Increased depression    Pravastatin        technetium sestamibi (CARDIOLITE) injection 10 millicurie, ONCE PRN  technetium sestamibi (CARDIOLITE) injection 30 millicurie, ONCE PRN  sodium chloride flush 0.9 % injection 5-40 mL, 2 times per day  sodium chloride flush 0.9 % injection 5-40 mL, PRN  0.9 % sodium chloride infusion, PRN  ondansetron (ZOFRAN-ODT) disintegrating tablet 4 mg, Q8H PRN   Or  ondansetron (ZOFRAN) injection 4 mg, Q6H PRN  polyethylene glycol (GLYCOLAX) packet 17 g, Daily PRN  acetaminophen (TYLENOL) tablet 650 mg, Q6H PRN   Or  acetaminophen (TYLENOL) suppository 650 mg, Q6H PRN  DULoxetine (CYMBALTA) extended release capsule 20 mg, Daily  levothyroxine (SYNTHROID) tablet 25 mcg, Daily  lactulose (CHRONULAC) 10 GM/15ML solution 10 g, Daily  nadolol (CORGARD) tablet 40 mg, Daily  spironolactone (ALDACTONE) tablet 100 mg, Daily  albuterol sulfate  (90 Base) MCG/ACT inhaler 2 puff, 4x Daily PRN  furosemide (LASIX) injection 40 mg, BID  sodium chloride flush 0.9 % injection 5-40 mL, 2 times per day  sodium chloride flush 0.9 % injection 5-40 mL, PRN  0.9 % sodium chloride infusion, PRN      Current Facility-Administered Medications   Medication Dose Route Frequency Provider Last Rate Last Admin    technetium sestamibi (CARDIOLITE) injection 10 millicurie  10 millicurie IntraVENous ONCE PRN Charlette Malave MD        technetium sestamibi (CARDIOLITE) injection 30 millicurie  30 millicurie IntraVENous ONCE PRN Charlette Malave MD        sodium chloride flush 0.9 % injection 5-40 mL  5-40 mL IntraVENous 2 times per day Delfina Zayas, APRN   10 mL at 09/13/21 2059    sodium chloride flush 0.9 % injection 5-40 mL  5-40 mL IntraVENous PRN Delfina Zayas APRNIC        0.9 % sodium chloride infusion  25 mL IntraVENous PRN Delfina Zayas APRN        ondansetron (ZOFRAN-ODT) disintegrating tablet 4 mg  4 mg Oral Q8H PRN Bindhu Sajay, APRN        Or    ondansetron (ZOFRAN) injection 4 mg  4 mg IntraVENous Q6H PRN Bindhu Sajay, APRN        polyethylene glycol (GLYCOLAX) packet 17 g  17 g Oral Daily PRN Bindhu Sajay, APRN        acetaminophen (TYLENOL) tablet 650 mg  650 mg Oral Q6H PRN Bindhu Sajay, APRN        Or    acetaminophen (TYLENOL) suppository 650 mg  650 mg Rectal Q6H PRN Bindhu Sajay, APRN        DULoxetine (CYMBALTA) extended release capsule 20 mg  20 mg Oral Daily Bindhu Sajay, APRN   20 mg at 09/14/21 1157    levothyroxine (SYNTHROID) tablet 25 mcg  25 mcg Oral Daily Bindhu Sajay, APRN   25 mcg at 09/14/21 1157    lactulose (CHRONULAC) 10 GM/15ML solution 10 g  10 g Oral Daily Bindhu Sajay, APRN   10 g at 09/14/21 1159    nadolol (CORGARD) tablet 40 mg  40 mg Oral Daily Bindhu Sajay, APRN   40 mg at 09/14/21 1158    spironolactone (ALDACTONE) tablet 100 mg  100 mg Oral Daily Bindhu Sajay, APRN   100 mg at 09/14/21 1157    albuterol sulfate  (90 Base) MCG/ACT inhaler 2 puff  2 puff Inhalation 4x Daily PRN Bindhu Sajay, APRN        furosemide (LASIX) injection 40 mg  40 mg IntraVENous BID Bindhu Sajay, APRN   40 mg at 09/14/21 1859    sodium chloride flush 0.9 % injection 5-40 mL  5-40 mL IntraVENous 2 times per day Bindhu Sajay, APRN   10 mL at 09/14/21 2002    sodium chloride flush 0.9 % injection 5-40 mL  5-40 mL IntraVENous PRN Bindhu Sajay, APRN        0.9 % sodium chloride infusion  25 mL IntraVENous PRN Bindhu Sajay, APRN         Review of Systems:   · Constitutional: No Fever or Weight Loss   · Eyes: No Decreased Vision  · ENT: No Headaches, Hearing Loss or Vertigo  · Cardiovascular: As per HPI  · Respiratory: As per HPI  · Gastrointestinal: No abdominal pain, appetite loss, blood in stools, constipation, diarrhea or heartburn  · Genitourinary: No dysuria, trouble voiding, or hematuria  · Musculoskeletal:  No gait disturbance, weakness or joint complaints  · Integumentary: No rash or pruritis  · Neurological: No TIA or stroke symptoms  · Psychiatric: No anxiety or depression  · Endocrine: No malaise, fatigue or temperature intolerance  · Hematologic/Lymphatic: No bleeding problems, blood clots or swollen lymph nodes  · Allergic/Immunologic: No nasal congestion or hives  All systems negative except as marked. Physical Examination:    Vitals:    09/15/21 0030 09/15/21 0330 09/15/21 0803 09/15/21 0828   BP: (!) 171/67 133/73  90/74   Pulse: 73 77  71   Resp: 16 16  18   Temp:    98 °F (36.7 °C)   TempSrc:    Oral   SpO2: 99% 92% 96% 99%   Weight:       Height:           General Appearance:  No distress, conversant    Constitutional:  Well developed, Well nourished, No acute distress, Non-toxic appearance. HENT:  Normocephalic, Atraumatic, Bilateral external ears normal, Oropharynx moist, No oral exudates, Nose normal. Neck- Normal range of motion, No tenderness, Supple, No stridor,no apical-carotid delay  Lymphatics : no palpable lymph nodes  Eyes:  PERRL, EOMI, Conjunctiva normal, No discharge. Respiratory:  Normal breath sounds, No respiratory distress, No wheezing, No chest tenderness. ,no use of accessory muscles, crackles Present  Cardiovascular: (PMI) apex non displaced,no lifts no thrills, ankle swelling Present , 1+, s1 and s2 audible,Murmur. Present, JVD not noted    Abdomen /GI:  Bowel sounds normal, Soft, No tenderness, No masses, No gross visceromegaly   :  No costovertebral angle tenderness   Musculoskeletal:  No edema, no tenderness, no deformities.  Back- no tenderness  Integument:  Well hydrated, no rash   Lymphatic:  No lymphadenopathy noted   Neurologic:  Alert & oriented x 3, CN 2-12 normal, normal motor function, normal sensory function, no focal deficits noted           Medical decision making and Data review:    Lab Review   Recent Labs     09/14/21  0610   WBC 8.3   HGB 11.3*   HCT 35.4*         Recent Labs 09/14/21  0610      K 3.9      CO2 30   BUN 13   CREATININE 0.7*     Recent Labs     09/13/21  0945   AST 33   ALT 16   BILITOT 0.8   ALKPHOS 139*     Recent Labs     09/13/21  0945 09/13/21  2115 09/14/21  1740   TROPONINT 0.013* 0.015* 0.026*       Recent Labs     09/13/21  0945   PROBNP 625.6*     Lab Results   Component Value Date    INR 1.09 09/13/2021    PROTIME 14.1 09/13/2021       EKG: (reviewed by myself)    ECHO:(reviewed by myself)    Chest Xray:(reviewed by myself)  Echocardiogram complete 2D with doppler with color    Result Date: 9/14/2021  Transthoracic Echocardiography Report (TTE)  Demographics   Patient Name       Paulette Jackson  Date of Study       09/14/2021   Date of Birth      1945        Gender              Male   Age                70 year(s)        Race                Unknown   Patient Number     [id number]        Room Number         OBS04   Visit Number       [id number]   Corporate ID       [id number]   Accession Number   [id number]        Peter Salvador RDMS, RVT   Ordering Physician Erica Gutierrez PA-C Interpreting        [place]                                       Physician           MD  Procedure Type of Study   TTE procedure:ECHOCARDIOGRAM COMPLETE 2D W DOPPLER W COLOR. Procedure Date Date: 09/14/2021 Start: 11:29 AM Study Location: Portable Technical Quality: Fair visualization Indications:Elevated BNP. Patient Status: Routine Height: 68 inches Weight: 330 pounds BSA: 2.53 m2 BMI: 50.18 kg/m2 HR: 69 bpm BP: 123/49 mmHg  Conclusions   Summary  Left ventricular systolic function is normal.  Ejection fraction is visually estimated at 50-55%. Mild left ventricular hypertrophy. Grade II diastolic dysfunction. Sclerotic, but non-stenotic aortic valve. Mild mitral regurgitation. Moderate tricuspid regurgitation; RVSP: 38 mmHg. No evidence of any pericardial effusion. Signature   ------------------------------------------------------------------  Electronically signed by Lashonda Hatfield MD (Interpreting  physician) on 09/14/2021 at 12:20 PM  ------------------------------------------------------------------   Findings   Left Ventricle  Left ventricular systolic function is normal.  Ejection fraction is visually estimated at 50-55%. Mild left ventricular hypertrophy. Grade II diastolic dysfunction. No regional wall motion abnormalites. Left ventricle size is normal.   Left Atrium  Moderately dilated left atrium. Right Atrium  Essentially normal right atrium. Right Ventricle  Essentially normal right ventricle. Aortic Valve  Sclerotic, but non-stenotic aortic valve. Mitral Valve  Mild mitral regurgitation. Tricuspid Valve  Moderate tricuspid regurgitation; RVSP: 38 mmHg. Pulmonic Valve  Pulmonic valve is structurally normal.   Pericardial Effusion  No evidence of any pericardial effusion. Pleural Effusion  No evidence of pleural effusion. Miscellaneous  IVC and abdominal aorta are within normal limits.   M-Mode/2D Measurements & Calculations   LV Diastolic Dimension:  LV Systolic Dimension:  LA Dimension: 4.2 cmAO Root  3.92 cm                  2.52 cm                 Dimension: 3.2 cmLA Area:  LV FS:35.7 %             LV Volume Diastolic: 79 95.2 cm2  LV PW Diastolic: 9.61 cm ml  LV PW Systolic: 2.91 cm  LV Volume Systolic: 25  Septum Diastolic: 4.70   ml  cm                       LV EDV/LV EDV Index: 79 RV Diastolic Dimension:  Septum Systolic: 7.31 cm UI/22 P4UA ESV/LV ESV   4.05 cm  CO: 6.37 l/min           Index: 25 ml/10 m2  CI: 2.52 l/m*m2          EF Calculated (A4C):    LA/Aorta: 1.31                           68.4 %  LV Area Diastolic: 08.1  EF Calculated (2D):     LA volume/Index: 84 ml  cm2                      65.8 %                  /23C5  LV Area Systolic: 81.7  cm2                      LV Length: 8.34 cm                            LVOT: 1.8 cm Doppler Measurements & Calculations   MV Peak E-Wave: 123  AV Peak Velocity: 221 cm/s    LVOT Peak Velocity: 173  cm/s                 AV Peak Gradient: 19.54 mmHg  cm/s  MV Peak A-Wave: 81.9 AV Mean Velocity: 155 cm/s    LVOT Mean Velocity: 124  cm/s                 AV Mean Gradient: 11 mmHg     cm/s  MV E/A Ratio: 1.5    AV VTI: 49.1 cm               LVOT Peak Gradient: 12  MV Peak Gradient:    AV Area (Continuity):1.88 cm2 mmHgLVOT Mean Gradient: 7  6.05 mmHg                                          mmHg                       LVOT VTI: 36.3 cm             Estimated RVSP: 54 mmHg  MV P1/2t: 86 msec                                  Estimated RAP:3 mmHg  MVA by PHT:2.56 cm2  Estimated PASP: 53.69 mmHg   MV E' Septal                                       TR Velocity:356 cm/s  Velocity: 8.66 cm/s                                TR Gradient:50.69 mmHg  MV E' Lateral  Velocity: 8.22 cm/s  MV E/E' septal: 14.2  MV E/E' lateral:  14.96      CT ABDOMEN PELVIS W WO CONTRAST Additional Contrast? Radiologist Recommendation    Result Date: 8/19/2021  EXAMINATION: CT OF THE ABDOMEN AND PELVIS WITH AND WITHOUT CONTRAST; CT OF THE CHEST WITH CONTRAST 8/19/2021 10:26 am TECHNIQUE: CT of the abdomen and pelvis was performed with and without the administration of intravenous contrast.  Multiplanar reformatted images are provided for review. Dose modulation, iterative reconstruction, and/or weight based adjustment of the mA/kV was utilized to reduce the radiation dose to as low as reasonably achievable.; CT of the chest was performed with the administration of intravenous contrast. Multiplanar reformatted images are provided for review. Dose modulation, iterative reconstruction, and/or weight based adjustment of the mA/kV was utilized to reduce the radiation dose to as low as reasonably achievable. COMPARISON: CT abdomen and pelvis 05/27/2019, and CTA chest 05/26/2019.  HISTORY: ORDERING SYSTEM PROVIDED HISTORY: Alcoholic cirrhosis of liver with ascites [facility]) TECHNOLOGIST PROVIDED HISTORY: Additional Contrast?->Radiologist Recommendation Reason for exam:->abdom enlargement/hx cirrhosis and ascites Reason for Exam: Alcoholic cirrhosis of liver with ascites;  abdomen enlargement / hx cirrhosis and ascites Acuity: Chronic Type of Exam: Ongoing Additional signs and symptoms: patient states abdominal swelling;  abdomen is very hard to the touch;  severe weight gain, nausea, ocnstipation and fatigue Relevant Medical/Surgical History: patient states abdominal swelling; abdomen is very hard to the touch;  severe weight gain, nausea, ocnstipation and fatigue; ORDERING SYSTEM PROVIDED HISTORY: Shortness of breath TECHNOLOGIST PROVIDED HISTORY: Reason for exam:->lymphadenopathy/SOB Reason for Exam: lymphadenopathy/SOB Acuity: Chronic Type of Exam: Ongoing Additional signs and symptoms: patient states abdominal swelling;  abdomen is very hard to the touch;  severe weight gain, nausea, ocnstipation and fatigue;  patient states since swelling has started difficult to breathe Relevant Medical/Surgical History: patient states abdominal swelling; abdomen is very hard to the touch;  severe weight gain, nausea, ocnstipation and fatigue;  patient states since swelling has started difficult to breathe FINDINGS: Chest: Mediastinum: Heart is upper limits of normal in size. No pericardial effusion. Atherosclerotic changes to the thoracic aorta. No evidence for thoracic aortic aneurysm. Coronary artery calcifications. Mediastinal and distal paraesophageal lymphadenopathy redemonstrated. Findings appear similar to prior exam.  Representative prevascular lymph node on the left measures 2.5 x 1.3 cm (image 43, axial sequence, series 5. A representative pre tracheal lymph node measures 1.1 x 1.7 cm (image 42, series 5). A representative subcarinal lymph node measures 2.6 x 1.3 cm (image 57, series 5).   A representative distal right paraesophageal lymph node measures 1.6 x 1.9 cm (image 96, series 5). No hilar lymphadenopathy is noted. Lungs/pleura: Trace right pleural effusion. No left pleural effusion. No focal consolidations or pulmonary edema. No pneumothoraces. Central airways appear patent. No noncalcified pulmonary nodules. No endobronchial lesions. Soft Tissues/Bones: No acute or suspicious bone or soft tissue abnormality. Stable well circumscribed ovoid low-attenuation lesion in the superficial subcutaneous fat of the paramidline right anterior chest, likely a benign etiology and likely reflecting sebaceous cyst measuring 1.7 x 1.1 cm (image 53, series 5). Abdomen/Pelvis: Organs: Cirrhotic liver morphology. No focal liver lesions noted on pre or postcontrast images. Spleen appears normal in size and unremarkable. Unremarkable pancreas, adrenal glands and right kidney. Benign exophytic cyst of the left kidney redemonstrated measuring 3.2 cm Gallbladder not visualized and may be surgically absent. GI/Bowel: No evidence for bowel obstruction or definite bowel wall thickening to un opacified large or small bowel. Pelvis: Unremarkable urinary bladder and prostate. Peritoneum/Retroperitoneum: Large volume ascites. No focal fluid collections or intraperitoneal free air. Atherosclerosis. No evidence for abdominal aortic aneurysm. No lymphadenopathy identified. There are some varices to the upper abdomen. Bones/Soft Tissues: No acute or suspicious bone or soft tissue abnormality. Findings compatible with avascular necrosis to the anterosuperior left femoral head redemonstrated. There now appears to be some mild subchondral collapse as compared to prior exam.     CT CHEST: Mediastinal and paraesophageal lymphadenopathy, similar to prior remote exam 05/26/2019, and likely reactive or inflammatory rather than neoplastic given the long-term stability. Trace right pleural effusion. Atherosclerosis to include coronary artery disease.  CT ABDOMEN AND PELVIS: Cirrhotic liver morphology without focal liver lesion. Stigmata of portal hypertension with mild varices to the upper abdomen. No splenomegaly. Large volume ascites. Avascular necrosis to the left femoral head redemonstrated. There now appears to be some mild subchondral collapse as compared to prior exam.     CT ABDOMEN PELVIS WO CONTRAST Additional Contrast? None    Result Date: 9/14/2021  EXAMINATION: CT OF THE ABDOMEN AND PELVIS WITHOUT CONTRAST, 9/13/2021 1:31 pm TECHNIQUE: CT of the abdomen and pelvis was performed without the administration of intravenous contrast. Multiplanar reformatted images are provided for review. Dose modulation, iterative reconstruction, and/or weight based adjustment of the mA/kV was utilized to reduce the radiation dose to as low as reasonably achievable. COMPARISON: 08/19/2021 HISTORY: ORDERING SYSTEM PROVIDED HISTORY:  Ascites TECHNOLOGIST PROVIDED HISTORY: Reason for Exam:  Ascites Additional Contrast?  None Reason for Exam:  Ascites Acuity:  Acute Type of Exam:  Initial Additional Signs and Symptoms:  SOB/very bloated and tight abdomen. FINDINGS: Lower Chest: Visualized portion of the lower chest demonstrates no acute abnormality. Scattered nonspecific foci of fluid in the inferior mediastinum, which are nonspecific and unchanged. Organs: Cirrhotic liver. The spleen, adrenals, kidneys, pancreas, bile ducts, and stomach are without acute process. The ureters are nondilated. The bladder is within normal limits. GI/Bowel: The appendix is not well visualized. No bowel obstruction. Pelvis: Normal prostate. Peritoneum/Retroperitoneum: Moderate atherosclerosis. No lymphadenopathy. Moderate to large amount of ascites. Mesenteric edema. No free air. Bones/Soft Tissues: Bilateral gynecomastia. Mild soft tissue anasarca. No acute osseous abnormality. Avascular necrosis of the left femoral head. 1. Large amount of ascites. 2. Cirrhotic liver.  3. 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 CT CHEST W CONTRAST    Result Date: 8/19/2021  EXAMINATION: CT OF THE ABDOMEN AND PELVIS WITH AND WITHOUT CONTRAST; CT OF THE CHEST WITH CONTRAST 8/19/2021 10:26 am TECHNIQUE: CT of the abdomen and pelvis was performed with and without the administration of intravenous contrast.  Multiplanar reformatted images are provided for review. Dose modulation, iterative reconstruction, and/or weight based adjustment of the mA/kV was utilized to reduce the radiation dose to as low as reasonably achievable.; CT of the chest was performed with the administration of intravenous contrast. Multiplanar reformatted images are provided for review. Dose modulation, iterative reconstruction, and/or weight based adjustment of the mA/kV was utilized to reduce the radiation dose to as low as reasonably achievable. COMPARISON: CT abdomen and pelvis 05/27/2019, and CTA chest 05/26/2019.  HISTORY: ORDERING SYSTEM PROVIDED HISTORY: Alcoholic cirrhosis of liver with ascites ([address] TECHNOLOGIST PROVIDED HISTORY: Additional Contrast?->Radiologist Recommendation Reason for exam:->abdom enlargement/hx cirrhosis and ascites Reason for Exam: Alcoholic cirrhosis of liver with ascites;  abdomen enlargement / hx cirrhosis and ascites Acuity: Chronic Type of Exam: Ongoing Additional signs and symptoms: patient states abdominal swelling;  abdomen is very hard to the touch;  severe weight gain, nausea, ocnstipation and fatigue Relevant Medical/Surgical History: patient states abdominal swelling; abdomen is very hard to the touch;  severe weight gain, nausea, ocnstipation and fatigue; ORDERING SYSTEM PROVIDED HISTORY: Shortness of breath TECHNOLOGIST PROVIDED HISTORY: Reason for exam:->lymphadenopathy/SOB Reason for Exam: lymphadenopathy/SOB Acuity: Chronic Type of Exam: Ongoing Additional signs and symptoms: patient states abdominal swelling;  abdomen is very hard to the touch;  severe weight gain, nausea, ocnstipation and No evidence for bowel obstruction or definite bowel wall thickening to un opacified large or small bowel. Pelvis: Unremarkable urinary bladder and prostate. Peritoneum/Retroperitoneum: Large volume ascites. No focal fluid collections or intraperitoneal free air. Atherosclerosis. No evidence for abdominal aortic aneurysm. No lymphadenopathy identified. There are some varices to the upper abdomen. Bones/Soft Tissues: No acute or suspicious bone or soft tissue abnormality. Findings compatible with avascular necrosis to the anterosuperior left femoral head redemonstrated. There now appears to be some mild subchondral collapse as compared to prior exam.     CT CHEST: Mediastinal and paraesophageal lymphadenopathy, similar to prior remote exam 05/26/2019, and likely reactive or inflammatory rather than neoplastic given the long-term stability. Trace right pleural effusion. Atherosclerosis to include coronary artery disease. CT ABDOMEN AND PELVIS: Cirrhotic liver morphology without focal liver lesion. Stigmata of portal hypertension with mild varices to the upper abdomen. No splenomegaly. Large volume ascites. Avascular necrosis to the left femoral head redemonstrated. There now appears to be some mild subchondral collapse as compared to prior exam.     XR CHEST PORTABLE    Result Date: 9/13/2021  EXAMINATION: ONE XRAY VIEW OF THE CHEST 9/13/2021 9:31 am COMPARISON: None. HISTORY: ORDERING SYSTEM PROVIDED HISTORY: SOB TECHNOLOGIST PROVIDED HISTORY: Reason for exam:->SOB Reason for Exam: SOB and leg swelling Acuity: Acute Type of Exam: Initial FINDINGS: The cardiomediastinal silhouette is enlarged. No pleural effusion or pneumothorax. No focal consolidation     No acute cardiopulmonary abnormality.      CT CHEST PULMONARY EMBOLISM W CONTRAST    Result Date: 9/14/2021  EXAMINATION: CTA OF THE CHEST 9/14/2021 11:24 am TECHNIQUE: CTA of the chest was performed after the administration of intravenous contrast. Multiplanar reformatted images are provided for review. MIP images are provided for review. Dose modulation, iterative reconstruction, and/or weight based adjustment of the mA/kV was utilized to reduce the radiation dose to as low as reasonably achievable. COMPARISON: 08/19/2021, 05/26/2019 HISTORY: ORDERING SYSTEM PROVIDED HISTORY: elevated D-dimer, SOB TECHNOLOGIST PROVIDED HISTORY: Reason for exam:->elevated D-dimer, SOB Reason for Exam: ELEVATED D-DIMER,SOB Acuity: Acute Type of Exam: Initial FINDINGS: Lungs/pleura: The central airways are patent. There are trace bilateral pleural effusions. No suspicious pulmonary nodules or masses are identified. Mediastinum: Multiple borderline enlarged mediastinal lymph nodes are overall similar in appearance to the previous exams dating back to May of 2019. Pulmonary arteries: There is adequate opacification of the pulmonary arteries to the subsegmental level. No suspicious filling defects are identified. Upper Abdomen: The liver is nodular in contour consistent with cirrhosis. There is moderate ascites. Findings are consistent with portal hypertension. Soft Tissues/Bones: There is extensive bilateral gynecomastia. There is a subcutaneous cyst within the right breast soft tissues. No evidence of pulmonary embolism or acute pulmonary abnormality. Findings consistent with chronic cirrhosis and portal hypertension. VL DUP LOWER EXTREMITY VENOUS BILATERAL    Result Date: 9/13/2021  EXAMINATION: DUPLEX VENOUS ULTRASOUND OF THE BILATERAL LOWER EXTREMITIES, 9/13/2021 1:51 pm TECHNIQUE: Duplex ultrasound using B-mode/gray scaled imaging and Doppler spectral analysis and color flow was obtained of the bilateral lower extremities.  COMPARISON: 05/26/2019 HISTORY: ORDERING SYSTEM PROVIDED HISTORY: r/o DVT TECHNOLOGIST PROVIDED HISTORY: Per chart, leg swelling, alcoholic cirrhosis, ascites, pt admitted Reason for exam:->r/o DVT Reason for Exam: Bilat leg swelling for weeks; no HX of DVT; Hx of CHF Acuity: Acute Type of Exam: Initial FINDINGS: Evaluation is limited due to patient body habitus and swelling. The visualized veins of the bilateral lower extremities are patent and free of echogenic thrombus. The veins demonstrate good compressibility with normal color flow study and spectral analysis. No evidence of DVT in either lower extremity. IR US GUIDED PARACENTESIS    Result Date: 9/13/2021  PROCEDURE: ULTRASOUND GUIDED PARACENTESIS 9/13/2021 HISTORY: ORDERING SYSTEM PROVIDED HISTORY: Ascites TECHNOLOGIST PROVIDED HISTORY: Reason for exam:->Ascites TECHNIQUE: Ultrasound guidance required for procedure to confirm presence of ascites, puncture site selection and real-time intra procedural guidance. Images made for patient's medical file. Following informed consent, pause a confirm/time-out and ultrasound-guided puncture site selection, skin and ultrasound probe were prepped and draped in sterile fashion. 10 mL 1% lidocaine without epinephrine was used for local anesthesia. Ultrasound-guided access peritoneal space achieved using trocar mounted 5 [place] catheter. Catheter was advanced off trocar introducer attached evacuated container. 05772 mL cloudy ascitic fluid removed the peritoneal space. 100 g intravenous albumin ordered for the procedure. Catheter removed. Dressing applied. Patient tolerated procedure well. FINDINGS: A total of 42840 mL cloudy yellow ascitic fluid was removed. Specimen sent for laboratory evaluation. 100 g intravenous albumin utilized for the procedure. Successful ultrasound guided paracentesis. All labs, medications and tests reviewed by myself including data  from outside source , patient and available family . Continue all other medications of all above medical condition listed as is.      Impression:  Principal Problem:    SOB (shortness of breath)  Active Problems:    Shortness of breath    Alcoholic cirrhosis of liver with ascites (HCC)-Dr Jacques    Hypertension  Resolved Problems:    * No resolved hospital problems. *      Assessment: 70 y. o.year old with PMH of  has a past medical history of GERD (gastroesophageal reflux disease) and Hypertension. Plan and Recommendations:    Elevated Troponin : Check serial troponin if they are not rising we will continue medical management for now. I Doubt ACS most probably Demand ischemia related leak, consider stress test for risk stratification due to multiple risk factors    Due to concerns for varices conservative  CHF: Heart failure with preserved EF probably secondary to volume overload due to cirrhosis agree with Lasix and increasing Aldactone his EF is normal with no significant valvular disease  Alcohol abuse he says he stopped drinking few weeks ago encouraged watch for withdrawal  DVT prophylaxis if no contraindication  6. Dyslipidemia: Check lipid panel          Thank you  much for consult and giving us the opportunity in contributing in the care of this patient. Please feel free to call me for any questions.        Naida Dallas MD, 9/15/2021 11:12 AM

## 2021-09-15 NOTE — PROGRESS NOTES
Jax Yin Pt IV removed and bleeding controlled. Pt educated on discharge instructions and denies any questions at this time. Pt skin is warm and dry, respirations are even and unlabored.  Pt escorted out to car via wheelchair

## 2021-09-15 NOTE — CONSULTS
Nutrition Education    · Verbally reviewed information with Patient  · Educated on Cirrhosis Nutrition Therapy and Low Sodium Nutrition Therapy (Nutrition Care Manual)  · Written educational materials provided. · Contact name and number provided. · Refer to Patient Education activity for more details.     Electronically signed by Rhonda Lynn RD, LD on 9/15/21 at 3:21 PM EDT    Contact: [phone]

## 2021-09-16 LAB
ANTI-NUCLEAR ANTIBODY (ANA): NORMAL
CULTURE: ABNORMAL
F-ACTIN AB, IGG: 25 UNITS (ref 0–19)
GRAM SMEAR: ABNORMAL
HAV AB SERPL IA-ACNC: NEGATIVE
Lab: ABNORMAL
SPECIMEN: ABNORMAL

## 2021-09-17 LAB
ALBUMIN ELP: 3.5 GM/DL (ref 3.2–5.6)
ALPHA-1-GLOBULIN: 0.3 GM/DL (ref 0.1–0.4)
ALPHA-2-GLOBULIN: 0.6 GM/DL (ref 0.4–1.2)
BETA GLOBULIN: 1 GM/DL (ref 0.5–1.3)
GAMMA GLOBULIN: 1.9 GM/DL (ref 0.5–1.6)
MS ALPHA-FETOPROTEIN: 2 NG/ML (ref 0–9)
SMOOTH MUSCLE AB IGG TITER: NORMAL
SPEP INTERPRETATION: ABNORMAL
TOTAL PROTEIN: 7.3 GM/DL (ref 6.4–8.2)

## 2021-09-18 LAB
CULTURE: NORMAL
CULTURE: NORMAL
Lab: NORMAL
Lab: NORMAL
SPECIMEN: NORMAL
SPECIMEN: NORMAL

## 2021-09-20 ENCOUNTER — HOSPITAL ENCOUNTER (OUTPATIENT)
Age: 76
Discharge: HOME | End: 2021-09-20
Payer: COMMERCIAL

## 2021-09-20 LAB
ANION GAP SERPL CALCULATED.3IONS-SCNC: 11 MMOL/L (ref 4–16)
BUN BLDV-MCNC: 16 MG/DL (ref 6–23)
CALCIUM SERPL-MCNC: 9.3 MG/DL (ref 8.3–10.6)
CHLORIDE BLD-SCNC: 97 MMOL/L (ref 99–110)
CO2: 28 MMOL/L (ref 21–32)
CREAT SERPL-MCNC: 1.2 MG/DL (ref 0.9–1.3)
GFR AFRICAN AMERICAN: >60 ML/MIN/1.73M2
GFR NON-AFRICAN AMERICAN: 59 ML/MIN/1.73M2
GLUCOSE BLD-MCNC: 99 MG/DL (ref 70–99)
POTASSIUM SERPL-SCNC: 4.7 MMOL/L (ref 3.5–5.1)
SODIUM BLD-SCNC: 136 MMOL/L (ref 135–145)

## 2021-09-20 PROCEDURE — 36415 COLL VENOUS BLD VENIPUNCTURE: CPT

## 2021-09-20 PROCEDURE — 80048 BASIC METABOLIC PNL TOTAL CA: CPT

## 2021-09-27 ENCOUNTER — HOSPITAL ENCOUNTER (OUTPATIENT)
Age: 76
Discharge: HOME | End: 2021-09-27
Payer: MEDICARE

## 2021-09-27 PROCEDURE — 80048 BASIC METABOLIC PNL TOTAL CA: CPT

## 2021-10-04 ENCOUNTER — HOSPITAL ENCOUNTER (OUTPATIENT)
Age: 76
Discharge: HOME | End: 2021-10-04
Payer: COMMERCIAL

## 2021-10-04 LAB
ANION GAP SERPL CALCULATED.3IONS-SCNC: 11 MMOL/L (ref 4–16)
BUN BLDV-MCNC: 17 MG/DL (ref 6–23)
CALCIUM SERPL-MCNC: 9.4 MG/DL (ref 8.3–10.6)
CHLORIDE BLD-SCNC: 99 MMOL/L (ref 99–110)
CO2: 27 MMOL/L (ref 21–32)
CREAT SERPL-MCNC: 1.3 MG/DL (ref 0.9–1.3)
GFR AFRICAN AMERICAN: >60 ML/MIN/1.73M2
GFR NON-AFRICAN AMERICAN: 54 ML/MIN/1.73M2
GLUCOSE BLD-MCNC: 113 MG/DL (ref 70–99)
POTASSIUM SERPL-SCNC: 4.4 MMOL/L (ref 3.5–5.1)
SODIUM BLD-SCNC: 137 MMOL/L (ref 135–145)

## 2021-10-04 PROCEDURE — 36415 COLL VENOUS BLD VENIPUNCTURE: CPT

## 2021-10-04 PROCEDURE — 80048 BASIC METABOLIC PNL TOTAL CA: CPT

## 2021-10-11 ENCOUNTER — HOSPITAL ENCOUNTER (OUTPATIENT)
Age: 76
Discharge: HOME | End: 2021-10-11
Payer: COMMERCIAL

## 2021-10-11 ENCOUNTER — OFFICE VISIT (OUTPATIENT)
Dept: GASTROENTEROLOGY | Age: 76
End: 2021-10-11
Payer: COMMERCIAL

## 2021-10-11 VITALS
HEART RATE: 68 BPM | DIASTOLIC BLOOD PRESSURE: 60 MMHG | HEIGHT: 68 IN | OXYGEN SATURATION: 92 % | WEIGHT: 308.4 LBS | SYSTOLIC BLOOD PRESSURE: 134 MMHG | TEMPERATURE: 98.1 F | BODY MASS INDEX: 46.74 KG/M2

## 2021-10-11 DIAGNOSIS — K70.31 ALCOHOLIC CIRRHOSIS OF LIVER WITH ASCITES (HCC): Primary | ICD-10-CM

## 2021-10-11 LAB
ANION GAP SERPL CALCULATED.3IONS-SCNC: 14 MMOL/L (ref 4–16)
BUN BLDV-MCNC: 14 MG/DL (ref 6–23)
CALCIUM SERPL-MCNC: 8.9 MG/DL (ref 8.3–10.6)
CHLORIDE BLD-SCNC: 101 MMOL/L (ref 99–110)
CO2: 27 MMOL/L (ref 21–32)
CREAT SERPL-MCNC: 0.7 MG/DL (ref 0.9–1.3)
GFR AFRICAN AMERICAN: >60 ML/MIN/1.73M2
GFR NON-AFRICAN AMERICAN: >60 ML/MIN/1.73M2
GLUCOSE BLD-MCNC: 79 MG/DL (ref 70–99)
POTASSIUM SERPL-SCNC: 3.7 MMOL/L (ref 3.5–5.1)
SODIUM BLD-SCNC: 142 MMOL/L (ref 135–145)

## 2021-10-11 PROCEDURE — 99204 OFFICE O/P NEW MOD 45 MIN: CPT | Performed by: SPECIALIST

## 2021-10-11 PROCEDURE — 36415 COLL VENOUS BLD VENIPUNCTURE: CPT

## 2021-10-11 PROCEDURE — 80048 BASIC METABOLIC PNL TOTAL CA: CPT

## 2021-10-11 RX ORDER — LACTULOSE 10 G/15ML
20 SOLUTION ORAL 3 TIMES DAILY
Qty: 270 ML | Refills: 5 | Status: SHIPPED | OUTPATIENT
Start: 2021-10-11 | End: 2021-10-13 | Stop reason: SDUPTHER

## 2021-10-11 RX ORDER — FERROUS SULFATE 325(65) MG
1 TABLET ORAL
Status: ON HOLD | COMMUNITY
End: 2022-05-26 | Stop reason: HOSPADM

## 2021-10-11 NOTE — PROGRESS NOTES
SUBJECTIVE:  No problems- denies abdominal pain,nausea,vomiting, diarrhea, constipation,melena, hematochezia, or hematemesis. Denies abdominal swelling, peripheral edema, periods of confusion    Last AFP:    2021--     2.0  Last liver imagin/2021--- CT -- cirrhosis plus ascites  Last EGD for varices: ??  Years ago  Hepatitis A immunity: No   Hepatitis B immunity: No  .alfts     ROS: non-contributory    OBJECTIVE:   PHYSICAL EXAM:    Vitals:  /60 (Site: Left Upper Arm, Position: Sitting, Cuff Size: Large Adult)   Pulse 68   Temp 98.1 °F (36.7 °C) (Infrared)   Ht 5' 8\" (1.727 m)   Wt (!) 308 lb 6.4 oz (139.9 kg)   SpO2 92%   BMI 46.89 kg/m²   CONSTITUTIONAL: alert  EYES:  pupils equal, round and reactive to light and sclera clear  LUNGS:  clear to auscultation  CARDIOVASCULAR:  regular rate and rhythm and no murmur noted  ABDOMEN:  normal bowel sounds, soft, non-distended, non-tender and no masses palpated, no hepatosplenomegally  NEUROLOGIC: no asterixis or focal deficit detected   EXTREMITIES: no clubbing, cyanosis, or edema    ASSESSMENT:    1) cirrhosis- stable    PLAN:   1) refill lactulose   2) increase lasix to 80  mg BID   3) increase spironolactone to BID   4) again advised sodium restriction   5) Hep A and B vaccines   6) discussed EGD - wants to do it later n(going to [place] for 2 weeks)   7) return visit 2 months

## 2021-10-13 ENCOUNTER — TELEPHONE (OUTPATIENT)
Dept: GASTROENTEROLOGY | Age: 76
End: 2021-10-13

## 2021-10-13 RX ORDER — LACTULOSE 10 G/15ML
20 SOLUTION ORAL 3 TIMES DAILY
Qty: 2700 ML | Refills: 5 | Status: ON HOLD | OUTPATIENT
Start: 2021-10-13 | End: 2022-03-21 | Stop reason: SDUPTHER

## 2021-10-13 NOTE — TELEPHONE ENCOUNTER
Please resend script for lactulose to [facility]. Patient checked and the pharm was down and did not receive scripts.   thx

## 2021-11-15 ENCOUNTER — HOSPITAL ENCOUNTER (OUTPATIENT)
Age: 76
Discharge: HOME | End: 2021-11-15
Payer: COMMERCIAL

## 2021-11-15 LAB
ANION GAP SERPL CALCULATED.3IONS-SCNC: 10 MMOL/L (ref 4–16)
BUN BLDV-MCNC: 17 MG/DL (ref 6–23)
CALCIUM SERPL-MCNC: 9 MG/DL (ref 8.3–10.6)
CHLORIDE BLD-SCNC: 98 MMOL/L (ref 99–110)
CO2: 29 MMOL/L (ref 21–32)
CREAT SERPL-MCNC: 1.3 MG/DL (ref 0.9–1.3)
GFR AFRICAN AMERICAN: >60 ML/MIN/1.73M2
GFR NON-AFRICAN AMERICAN: 54 ML/MIN/1.73M2
GLUCOSE BLD-MCNC: 107 MG/DL (ref 70–99)
POTASSIUM SERPL-SCNC: 3.9 MMOL/L (ref 3.5–5.1)
SODIUM BLD-SCNC: 137 MMOL/L (ref 135–145)

## 2021-11-15 PROCEDURE — 36415 COLL VENOUS BLD VENIPUNCTURE: CPT

## 2021-11-15 PROCEDURE — 80048 BASIC METABOLIC PNL TOTAL CA: CPT

## 2021-11-15 PROCEDURE — 86708 HEPATITIS A ANTIBODY: CPT

## 2021-11-18 RX ORDER — LEVOTHYROXINE SODIUM 0.03 MG/1
25 TABLET ORAL DAILY
Qty: 90 TABLET | Refills: 1 | Status: SHIPPED | OUTPATIENT
Start: 2021-11-18 | End: 2022-03-30

## 2021-11-18 RX ORDER — LEVOTHYROXINE SODIUM 25 UG/1
TABLET ORAL
Qty: 90 TABLET | Refills: 0 | Status: SHIPPED | OUTPATIENT
Start: 2021-11-18 | End: 2022-02-11 | Stop reason: CLARIF

## 2021-11-18 RX ORDER — NADOLOL 40 MG/1
40 TABLET ORAL DAILY
Qty: 90 TABLET | Refills: 1 | Status: SHIPPED | OUTPATIENT
Start: 2021-11-18 | End: 2022-07-20 | Stop reason: SDUPTHER

## 2021-11-22 ENCOUNTER — HOSPITAL ENCOUNTER (OUTPATIENT)
Age: 76
Setting detail: SPECIMEN
Discharge: HOME | End: 2021-11-22
Payer: COMMERCIAL

## 2021-11-22 LAB
ANION GAP SERPL CALCULATED.3IONS-SCNC: 10 MMOL/L (ref 4–16)
BUN BLDV-MCNC: 19 MG/DL (ref 6–23)
CALCIUM SERPL-MCNC: 9.6 MG/DL (ref 8.3–10.6)
CHLORIDE BLD-SCNC: 97 MMOL/L (ref 99–110)
CO2: 28 MMOL/L (ref 21–32)
CREAT SERPL-MCNC: 1.3 MG/DL (ref 0.9–1.3)
GFR AFRICAN AMERICAN: >60 ML/MIN/1.73M2
GFR NON-AFRICAN AMERICAN: 54 ML/MIN/1.73M2
GLUCOSE BLD-MCNC: 96 MG/DL (ref 70–99)
POTASSIUM SERPL-SCNC: 4.2 MMOL/L (ref 3.5–5.1)
SODIUM BLD-SCNC: 135 MMOL/L (ref 135–145)

## 2021-11-22 PROCEDURE — 80048 BASIC METABOLIC PNL TOTAL CA: CPT

## 2021-11-22 PROCEDURE — 36415 COLL VENOUS BLD VENIPUNCTURE: CPT

## 2021-11-29 ENCOUNTER — HOSPITAL ENCOUNTER (OUTPATIENT)
Age: 76
Setting detail: SPECIMEN
Discharge: HOME | End: 2021-11-29
Payer: COMMERCIAL

## 2021-11-29 LAB
ANION GAP SERPL CALCULATED.3IONS-SCNC: 12 MMOL/L (ref 4–16)
BUN BLDV-MCNC: 21 MG/DL (ref 6–23)
CALCIUM SERPL-MCNC: 9.1 MG/DL (ref 8.3–10.6)
CHLORIDE BLD-SCNC: 99 MMOL/L (ref 99–110)
CO2: 29 MMOL/L (ref 21–32)
CREAT SERPL-MCNC: 1.3 MG/DL (ref 0.9–1.3)
GFR AFRICAN AMERICAN: >60 ML/MIN/1.73M2
GFR NON-AFRICAN AMERICAN: 54 ML/MIN/1.73M2
GLUCOSE BLD-MCNC: 110 MG/DL (ref 70–99)
POTASSIUM SERPL-SCNC: 4.1 MMOL/L (ref 3.5–5.1)
SODIUM BLD-SCNC: 140 MMOL/L (ref 135–145)

## 2021-11-29 PROCEDURE — 80048 BASIC METABOLIC PNL TOTAL CA: CPT

## 2021-11-29 PROCEDURE — 36415 COLL VENOUS BLD VENIPUNCTURE: CPT

## 2021-12-06 ENCOUNTER — HOSPITAL ENCOUNTER (OUTPATIENT)
Age: 76
Setting detail: SPECIMEN
Discharge: HOME | End: 2021-12-06
Payer: COMMERCIAL

## 2021-12-06 LAB
ANION GAP SERPL CALCULATED.3IONS-SCNC: 9 MMOL/L (ref 4–16)
BUN BLDV-MCNC: 23 MG/DL (ref 6–23)
CALCIUM SERPL-MCNC: 9.5 MG/DL (ref 8.3–10.6)
CHLORIDE BLD-SCNC: 99 MMOL/L (ref 99–110)
CO2: 29 MMOL/L (ref 21–32)
CREAT SERPL-MCNC: 1.1 MG/DL (ref 0.9–1.3)
GFR AFRICAN AMERICAN: >60 ML/MIN/1.73M2
GFR NON-AFRICAN AMERICAN: >60 ML/MIN/1.73M2
GLUCOSE BLD-MCNC: 99 MG/DL (ref 70–99)
POTASSIUM SERPL-SCNC: 4.6 MMOL/L (ref 3.5–5.1)
SODIUM BLD-SCNC: 137 MMOL/L (ref 135–145)

## 2021-12-06 PROCEDURE — 36415 COLL VENOUS BLD VENIPUNCTURE: CPT

## 2021-12-06 PROCEDURE — 80048 BASIC METABOLIC PNL TOTAL CA: CPT

## 2021-12-13 ENCOUNTER — OFFICE VISIT (OUTPATIENT)
Dept: GASTROENTEROLOGY | Age: 76
End: 2021-12-13
Payer: COMMERCIAL

## 2021-12-13 VITALS
HEART RATE: 76 BPM | SYSTOLIC BLOOD PRESSURE: 136 MMHG | TEMPERATURE: 98.1 F | DIASTOLIC BLOOD PRESSURE: 62 MMHG | BODY MASS INDEX: 45.74 KG/M2 | WEIGHT: 301.8 LBS | OXYGEN SATURATION: 92 % | HEIGHT: 68 IN

## 2021-12-13 DIAGNOSIS — K70.31 ASCITES DUE TO ALCOHOLIC CIRRHOSIS (HCC): Primary | ICD-10-CM

## 2021-12-13 PROCEDURE — 99214 OFFICE O/P EST MOD 30 MIN: CPT | Performed by: SPECIALIST

## 2021-12-13 NOTE — PROGRESS NOTES
SUBJECTIVE:  lost 7 lbs. with increased diuresis. Drank 1 beer recently- not adding salt but not watching the sodium content of foods.  Taking 80 mg Lasix BID and 100 mg Aldactone daily    Last AFP:    2021--     2.0  Last liver imagin/2021--- CT -- cirrhosis plus ascites  Last EGD for varices: none recently  Hepatitis A immunity: no   Hepatitis B immunity: no  .alfts     ROS: non-contributory    OBJECTIVE:   PHYSICAL EXAM:    Vitals:  /62 (Site: Left Upper Arm, Position: Sitting, Cuff Size: Large Adult)   Pulse 76   Temp 98.1 °F (36.7 °C) (Infrared)   Ht 5' 8\" (1.727 m)   Wt (!) 301 lb 12.8 oz (136.9 kg)   SpO2 92%   BMI 45.89 kg/m²   CONSTITUTIONAL: alert  EYES:  pupils equal, round and reactive to light and sclera clear  LUNGS:  clear to auscultation  CARDIOVASCULAR:  regular rate and rhythm and no murmur noted  ABDOMEN:  normal bowel sounds, soft, distended with ascites, non-tender and no masses palpated, no hepatosplenomegally  NEUROLOGIC: no asterixis or focal deficit detected   EXTREMITIES: no clubbing, cyanosis, #+ edema    ASSESSMENT:    1) cirrhosis- with persistent ascites    PLAN:   1) repeat paracentesis   2) encouraged stricter sodium control   3) EGD to check for varices   4) return 2 months

## 2021-12-23 ENCOUNTER — TELEPHONE (OUTPATIENT)
Dept: GASTROENTEROLOGY | Age: 76
End: 2021-12-23

## 2021-12-23 NOTE — TELEPHONE ENCOUNTER
Per my call to Yana at [facility]; No auth required for EGD if OP procedure. Ref# [id number].

## 2021-12-27 ENCOUNTER — HOSPITAL ENCOUNTER (OUTPATIENT)
Age: 76
Discharge: HOME | End: 2021-12-27
Payer: COMMERCIAL

## 2021-12-27 LAB
ANION GAP SERPL CALCULATED.3IONS-SCNC: 8 MMOL/L (ref 4–16)
BUN BLDV-MCNC: 21 MG/DL (ref 6–23)
CALCIUM SERPL-MCNC: 9.7 MG/DL (ref 8.3–10.6)
CHLORIDE BLD-SCNC: 101 MMOL/L (ref 99–110)
CO2: 32 MMOL/L (ref 21–32)
CREAT SERPL-MCNC: 1.3 MG/DL (ref 0.9–1.3)
GFR AFRICAN AMERICAN: >60 ML/MIN/1.73M2
GFR NON-AFRICAN AMERICAN: 54 ML/MIN/1.73M2
GLUCOSE BLD-MCNC: 154 MG/DL (ref 70–99)
POTASSIUM SERPL-SCNC: 4.3 MMOL/L (ref 3.5–5.1)
SODIUM BLD-SCNC: 141 MMOL/L (ref 135–145)

## 2021-12-27 PROCEDURE — 80048 BASIC METABOLIC PNL TOTAL CA: CPT

## 2021-12-27 PROCEDURE — 36415 COLL VENOUS BLD VENIPUNCTURE: CPT

## 2022-01-04 DIAGNOSIS — Z01.818 PRE-OP TESTING: Primary | ICD-10-CM

## 2022-01-10 ENCOUNTER — HOSPITAL ENCOUNTER (OUTPATIENT)
Age: 77
Discharge: HOME | End: 2022-01-10
Payer: COMMERCIAL

## 2022-01-10 LAB
ANION GAP SERPL CALCULATED.3IONS-SCNC: 6 MMOL/L (ref 4–16)
BUN BLDV-MCNC: 17 MG/DL (ref 6–23)
CALCIUM SERPL-MCNC: 9.5 MG/DL (ref 8.3–10.6)
CHLORIDE BLD-SCNC: 99 MMOL/L (ref 99–110)
CO2: 32 MMOL/L (ref 21–32)
CREAT SERPL-MCNC: 1.1 MG/DL (ref 0.9–1.3)
GFR AFRICAN AMERICAN: >60 ML/MIN/1.73M2
GFR NON-AFRICAN AMERICAN: >60 ML/MIN/1.73M2
GLUCOSE BLD-MCNC: 123 MG/DL (ref 70–99)
POTASSIUM SERPL-SCNC: 4.4 MMOL/L (ref 3.5–5.1)
SODIUM BLD-SCNC: 137 MMOL/L (ref 135–145)

## 2022-01-10 PROCEDURE — 80048 BASIC METABOLIC PNL TOTAL CA: CPT

## 2022-01-10 PROCEDURE — 36415 COLL VENOUS BLD VENIPUNCTURE: CPT

## 2022-01-10 NOTE — PROGRESS NOTES
Patient will arrive at [address] [place] [address] on 1/19/2022 for his procedure at 0915. 1. Do not eat or drink anything after midnight - unless instructed by your doctor prior to surgery. This includes                   no water, chewing gum or mints. 2. Follow your directions as prescribed by the doctor for your procedure and medications. 3. Check with your Doctor regarding stopping vitamins, supplements, blood thinners (Plavix, Coumadin, Lovenox, Effient, Pradaxa, Xarelto, Fragmin or                   other blood thinners) and follow their instructions. 4. Do not smoke, and do not drink any alcoholic beverages 24 hours prior to surgery. This includes NA Beer. 5. You may brush your teeth and gargle the morning of surgery. DO NOT SWALLOW WATER   6. You MUST make arrangements for a responsible adult to take you home after your surgery and be able to check on you every couple                   hours for the day. You will not be allowed to leave alone or drive yourself home. It is strongly suggested someone stay with you the first 24                   hrs. Your surgery will be cancelled if you do not have a ride home. 7. Please wear simple, loose fitting clothing to the hospital.  Felipe Connell not bring valuables (money, credit cards, checkbooks, etc.) Do not wear any                   makeup (including no eye makeup) or nail polish on your fingers or toes. 8. DO NOT wear any jewelry or piercings on day of surgery. All body piercing jewelry must be removed. 9. If you have dentures, they will be removed before going to the OR; we will provide you a container. If you wear contact lenses or glasses,                  they will be removed; please bring a case for them. 10. If you  have a Living Will and Durable Power of  for Healthcare, please bring in a copy.            11. Please bring picture ID,  insurance card, paperwork from the doctors office    (H & P, Consent, & card for implantable devices). 12. Take a shower the night before or morning of your procedure, do not apply any lotion, oil or powder. 13. Wear a mask covering your nose & mouth when entering the hospital. Have your covid-19 test performed within 2-7 days of your                  surgery. Quarantine yourself after the test until after your surgery. Patient will take his synthroid, nadolol, nexium and cymbalta the morning of his procedure.

## 2022-01-13 ENCOUNTER — HOSPITAL ENCOUNTER (OUTPATIENT)
Age: 77
Setting detail: SPECIMEN
Discharge: HOME | End: 2022-01-13
Payer: COMMERCIAL

## 2022-01-13 ENCOUNTER — NURSE ONLY (OUTPATIENT)
Dept: GASTROENTEROLOGY | Age: 77
End: 2022-01-13

## 2022-01-13 DIAGNOSIS — Z01.818 PRE-OP TESTING: Primary | ICD-10-CM

## 2022-01-13 PROCEDURE — U0003 INFECTIOUS AGENT DETECTION BY NUCLEIC ACID (DNA OR RNA); SEVERE ACUTE RESPIRATORY SYNDROME CORONAVIRUS 2 (SARS-COV-2) (CORONAVIRUS DISEASE [COVID-19]), AMPLIFIED PROBE TECHNIQUE, MAKING USE OF HIGH THROUGHPUT TECHNOLOGIES AS DESCRIBED BY CMS-2020-01-R: HCPCS

## 2022-01-13 PROCEDURE — U0005 INFEC AGEN DETEC AMPLI PROBE: HCPCS

## 2022-01-13 NOTE — PROGRESS NOTES
Patient is here for pre op covid screening for upcoming procedure with Dr. Justin Lewis. Patient tolerated, questions were answered, and another copy of prep instructions were given to the patient.

## 2022-01-14 LAB
SARS-COV-2: NOT DETECTED
SOURCE: NORMAL

## 2022-01-17 ENCOUNTER — ANESTHESIA EVENT (OUTPATIENT)
Dept: ENDOSCOPY | Age: 77
End: 2022-01-17
Payer: COMMERCIAL

## 2022-01-17 ASSESSMENT — ENCOUNTER SYMPTOMS: SHORTNESS OF BREATH: 1

## 2022-01-17 NOTE — ANESTHESIA PRE PROCEDURE
Department of Anesthesiology  Preprocedure Note       Name:  Renetta Castellano   Age:  78 y.o.  :  1945                                          MRN:  [id number]         Date:  2022      Surgeon: Dereje Lofton):  Giana Maldonado MD    Procedure: Procedure(s):  EGD ESOPHAGOGASTRODUODENOSCOPY    Medications prior to admission:   Prior to Admission medications    Medication Sig Start Date End Date Taking? Authorizing Provider   EUTHYROX 25 MCG tablet Take 1 tablet by mouth once daily 21   Rufino Starks MD   levothyroxine (SYNTHROID) 25 MCG tablet Take 1 tablet by mouth Daily 21  Rufino Starks MD   nadolol (CORGARD) 40 MG tablet Take 1 tablet by mouth daily 21  Rufino Starks MD   lactulose [facility]) 10 GM/15ML solution Take 30 mLs by mouth 3 times daily 10/13/21   Giana Maldonado MD   ferrous sulfate (IRON 325) 325 (65 Fe) MG tablet Take 1 tablet by mouth    Historical Provider, MD   zinc gluconate 50 MG tablet Take 50 mg by mouth 2 times daily    Historical Provider, MD   b complex vitamins capsule Take 1 capsule by mouth daily    Historical Provider, MD   furosemide (LASIX) 40 MG tablet TAKE 1 tab am and pm  Patient taking differently: Take 40 mg by mouth 2 times daily TAKE 1 tab am and pm 8/30/21 4/15/22  Rufino Starks MD   spironolactone (ALDACTONE) 50 MG tablet Take 2 tablets by mouth daily 21  Rufino Starks MD   albuterol sulfate HFA (VENTOLIN HFA) 108 (90 Base) MCG/ACT inhaler Inhale 2 puffs into the lungs 4 times daily as needed for Wheezing  Patient not taking: Reported on 2021   Rufino Starks MD   Testosterone Cypionate 200 MG/ML KIT Inject into the muscle every 14 days.      Historical Provider, MD   tadalafil (CIALIS) 20 MG tablet Take 20 mg by mouth every 3 days     Historical Provider, MD   celecoxib (CELEBREX) 200 MG capsule Take 1 capsule by mouth daily 21   Rufino Starks MD DULoxetine (CYMBALTA) 60 MG extended release capsule TAKE 1 CAPSULE BY MOUTH ONCE DAILY 5/7/21 12/13/21  Brenda Cotton MD   vitamin E 400 UNIT capsule Take 800 Units by mouth daily    Historical Provider, MD   magnesium oxide (MAG-OX) 400 MG tablet Take 200 mg by mouth 2 times daily OTC 10/15/19   Historical Provider, MD   Multiple Vitamin (MULTI VITAMIN MENS PO) Take 1 tablet by mouth daily    Historical Provider, MD   ESOMEPRAZOLE MAGNESIUM PO Take 20 mg by mouth daily OTC    Historical Provider, MD   Foley-3 1000 MG CAPS Take 1 capsule by mouth daily    Historical Provider, MD       Current medications:    No current facility-administered medications for this encounter. Current Outpatient Medications   Medication Sig Dispense Refill    EUTHYROX 25 MCG tablet Take 1 tablet by mouth once daily 90 tablet 0    levothyroxine (SYNTHROID) 25 MCG tablet Take 1 tablet by mouth Daily 90 tablet 1    nadolol (CORGARD) 40 MG tablet Take 1 tablet by mouth daily 90 tablet 1    lactulose (CHRONULAC) 10 GM/15ML solution Take 30 mLs by mouth 3 times daily 2700 mL 5    ferrous sulfate (IRON 325) 325 (65 Fe) MG tablet Take 1 tablet by mouth      zinc gluconate 50 MG tablet Take 50 mg by mouth 2 times daily      b complex vitamins capsule Take 1 capsule by mouth daily      furosemide (LASIX) 40 MG tablet TAKE 1 tab am and pm (Patient taking differently: Take 40 mg by mouth 2 times daily TAKE 1 tab am and pm) 180 tablet 1    spironolactone (ALDACTONE) 50 MG tablet Take 2 tablets by mouth daily 90 tablet 1    albuterol sulfate HFA (VENTOLIN HFA) 108 (90 Base) MCG/ACT inhaler Inhale 2 puffs into the lungs 4 times daily as needed for Wheezing (Patient not taking: Reported on 12/13/2021) 1 Inhaler 0    Testosterone Cypionate 200 MG/ML KIT Inject into the muscle every 14 days.        tadalafil (CIALIS) 20 MG tablet Take 20 mg by mouth every 3 days       celecoxib (CELEBREX) 200 MG capsule Take 1 capsule by mouth daily 30 capsule 1    DULoxetine (CYMBALTA) 60 MG extended release capsule TAKE 1 CAPSULE BY MOUTH ONCE DAILY 90 capsule 1    vitamin E 400 UNIT capsule Take 800 Units by mouth daily      magnesium oxide (MAG-OX) 400 MG tablet Take 200 mg by mouth 2 times daily OTC  1    Multiple Vitamin (MULTI VITAMIN MENS PO) Take 1 tablet by mouth daily      ESOMEPRAZOLE MAGNESIUM PO Take 20 mg by mouth daily OTC      Omega-3 1000 MG CAPS Take 1 capsule by mouth daily         Allergies:     Allergies   Allergen Reactions    Lisinopril Swelling     Tongue swelling      Zetia [Ezetimibe] Swelling     Facial swelling    Atorvastatin     Codeine Other (See Comments)     Blood pressure drops    Hydrochlorothiazide     Hydroxyzine      Fatigue and depressed    Lexapro [Escitalopram Oxalate]      Increased depression    Pravastatin        Problem List:    Patient Active Problem List   Diagnosis Code    Shortness of breath R06.02    History of colonic polyps G73.846    Alcoholic cirrhosis of liver with ascites (HCC)-Dr Jacques K70.31    Mixed hyperlipidemia E78.2    Hypertension I10    History of alcohol abuse F10.11    Gastroesophageal reflux disease without esophagitis K21.9    Anxiety F41.9    Pulmonary nodules R91.8    Hyperglycemia R73.9    Acquired hypothyroidism E03.9    Class 3 severe obesity with body mass index (BMI) of 40.0 to 44.9 in adult (HCC) E66.01, Z68.41    Bilateral hearing loss H91.93    Increased ammonia level R79.89    JENNIFER (obstructive sleep apnea) G47.33    Portal hypertension (HCC) K76.6    SOB (shortness of breath) R06.02       Past Medical History:        Diagnosis Date    Anxiety     Cirrhosis, alcoholic (HCC)     GERD (gastroesophageal reflux disease) 2000    GERD (gastroesophageal reflux disease)     Hyperlipidemia     Hypertension     Portal hypertension (HCC)     Pulmonary nodules     Sleep apnea     SOB (shortness of breath)        Past Surgical History: Procedure Laterality Date    CHOLECYSTECTOMY      COLONOSCOPY      ENDOSCOPY, COLON, DIAGNOSTIC      FOOT SURGERY      needle removed,not sure which foot, per wife.  VASECTOMY         Social History:    Social History     Tobacco Use    Smoking status: Former Smoker    Smokeless tobacco: Never Used   Substance Use Topics    Alcohol use: Not Currently                                Counseling given: Not Answered      Vital Signs (Current):   Vitals:    01/10/22 1402   Weight: (!) 304 lb (137.9 kg)   Height: 5' 8\" (1.727 m)                                              BP Readings from Last 3 Encounters:   12/13/21 136/62   10/11/21 134/60   09/15/21 129/65       NPO Status:                                                                                 BMI:   Wt Readings from Last 3 Encounters:   12/13/21 (!) 301 lb 12.8 oz (136.9 kg)   10/11/21 (!) 308 lb 6.4 oz (139.9 kg)   09/13/21 (!) 330 lb (149.7 kg)     Body mass index is 46.22 kg/m². CBC:   Lab Results   Component Value Date    WBC 8.3 09/14/2021    RBC 3.55 09/14/2021    HGB 11.3 09/14/2021    HCT 35.4 09/14/2021    MCV 99.7 09/14/2021    RDW 14.9 09/14/2021     09/14/2021       CMP:   Lab Results   Component Value Date     01/10/2022    K 4.4 01/10/2022    CL 99 01/10/2022    CO2 32 01/10/2022    BUN 17 01/10/2022    CREATININE 1.1 01/10/2022    GFRAA >60 01/10/2022    AGRATIO 0.8 08/09/2021    LABGLOM >60 01/10/2022    GLUCOSE 123 01/10/2022    PROT 7.3 09/14/2021    CALCIUM 9.5 01/10/2022    BILITOT 0.8 09/13/2021    ALKPHOS 139 09/13/2021    AST 33 09/13/2021    ALT 16 09/13/2021       POC Tests: No results for input(s): POCGLU, POCNA, POCK, POCCL, POCBUN, POCHEMO, POCHCT in the last 72 hours.     Coags:   Lab Results   Component Value Date    PROTIME 14.1 09/13/2021    INR 1.09 09/13/2021    APTT 30.9 05/28/2019       HCG (If Applicable): No results found for: PREGTESTUR, PREGSERUM, HCG, HCGQUANT     ABGs: No results found for: PHART, PO2ART, HMI2YDM, NEV0GBR, BEART, I0OWXEUH     Type & Screen (If Applicable):  No results found for: LABABO, LABRH    Drug/Infectious Status (If Applicable):  Lab Results   Component Value Date    HEPCAB NON REACTIVE 05/28/2019       COVID-19 Screening (If Applicable):   Lab Results   Component Value Date    COVID19 NOT DETECTED 01/13/2022           Anesthesia Evaluation  Patient summary reviewed  Airway: Mallampati: IV        Dental:          Pulmonary: breath sounds clear to auscultation  (+) shortness of breath:  sleep apnea:                            ROS comment: Pulmonary nodules   Cardiovascular:    (+) hypertension:, CHF:, hyperlipidemia        Rhythm: regular             Beta Blocker:  Not on Beta Blocker         Neuro/Psych:   (+) depression/anxiety              ROS comment: History of alcohol abuse GI/Hepatic/Renal:   (+) GERD:, liver disease (alchoholic cirrhosis):, morbid obesity          Endo/Other:    (+) hypothyroidism::., electrolyte abnormalities, . Abdominal:   (+) obese,           Vascular: Other Findings:           Anesthesia Plan      MAC     ASA 4       Induction: intravenous. MIPS: Postoperative opioids intended. Anesthetic plan and risks discussed with patient. Plan discussed with CRNA. Attending anesthesiologist reviewed and agrees with Preprocedure content     Pre Anesthesia Assessment complete.  Chart reviewed on 1/17/2022        MOHINDER Sanchez - CRNA   1/17/2022

## 2022-01-18 NOTE — H&P
Original H &P in soft chart. I have examined the patient immediately before the procedure and there is no change in the previous history and physical exam, which has been reviewed. There is a history of sleep apnea. There has been no  previous adverse experience with sedation/anesthesia. There is no increased risk for aspiration of gastric contents. The patient has been instructed that all resuscitative measures (during the operative and immediate perioperative period) will be instituted in the unlikely event that they will be needed. The patient has no pertinent past surgical or family history other than listed in the original H&P. The patient was counseled about the risks of liban Covid-19 during their perioperative period and any recovery window from their procedure. The patient was made aware that liban Covid-19  may worsen their prognosis for recovering from their procedure  and lend to a higher morbidity and/or mortality risk. All material risks, benefits, and reasonable alternatives including postponing the procedure were discussed. The patient does wish to proceed with the procedure at this time.     ASA Class: 3  AIRWAY Class: 1

## 2022-01-19 ENCOUNTER — ANESTHESIA (OUTPATIENT)
Dept: ENDOSCOPY | Age: 77
End: 2022-01-19
Payer: COMMERCIAL

## 2022-01-19 ENCOUNTER — HOSPITAL ENCOUNTER (OUTPATIENT)
Age: 77
Setting detail: OUTPATIENT SURGERY
Discharge: HOME | End: 2022-01-19
Attending: SPECIALIST | Admitting: SPECIALIST
Payer: COMMERCIAL

## 2022-01-19 VITALS
SYSTOLIC BLOOD PRESSURE: 135 MMHG | HEIGHT: 68 IN | TEMPERATURE: 97.3 F | OXYGEN SATURATION: 96 % | RESPIRATION RATE: 18 BRPM | WEIGHT: 304 LBS | HEART RATE: 64 BPM | BODY MASS INDEX: 46.07 KG/M2 | DIASTOLIC BLOOD PRESSURE: 74 MMHG

## 2022-01-19 VITALS — OXYGEN SATURATION: 98 % | DIASTOLIC BLOOD PRESSURE: 72 MMHG | SYSTOLIC BLOOD PRESSURE: 118 MMHG

## 2022-01-19 DIAGNOSIS — I86.4 VARICES, GASTRIC: ICD-10-CM

## 2022-01-19 PROCEDURE — 43239 EGD BIOPSY SINGLE/MULTIPLE: CPT | Performed by: SPECIALIST

## 2022-01-19 PROCEDURE — 2709999900 HC NON-CHARGEABLE SUPPLY: Performed by: SPECIALIST

## 2022-01-19 PROCEDURE — 2500000003 HC RX 250 WO HCPCS: Performed by: NURSE ANESTHETIST, CERTIFIED REGISTERED

## 2022-01-19 PROCEDURE — 7100000011 HC PHASE II RECOVERY - ADDTL 15 MIN: Performed by: SPECIALIST

## 2022-01-19 PROCEDURE — 3700000001 HC ADD 15 MINUTES (ANESTHESIA): Performed by: SPECIALIST

## 2022-01-19 PROCEDURE — 88342 IMHCHEM/IMCYTCHM 1ST ANTB: CPT

## 2022-01-19 PROCEDURE — 3700000000 HC ANESTHESIA ATTENDED CARE: Performed by: SPECIALIST

## 2022-01-19 PROCEDURE — 6360000002 HC RX W HCPCS: Performed by: NURSE ANESTHETIST, CERTIFIED REGISTERED

## 2022-01-19 PROCEDURE — 2580000003 HC RX 258: Performed by: SPECIALIST

## 2022-01-19 PROCEDURE — 88305 TISSUE EXAM BY PATHOLOGIST: CPT

## 2022-01-19 PROCEDURE — 7100000010 HC PHASE II RECOVERY - FIRST 15 MIN: Performed by: SPECIALIST

## 2022-01-19 PROCEDURE — 3609012400 HC EGD TRANSORAL BIOPSY SINGLE/MULTIPLE: Performed by: SPECIALIST

## 2022-01-19 RX ORDER — SODIUM CHLORIDE, SODIUM LACTATE, POTASSIUM CHLORIDE, CALCIUM CHLORIDE 600; 310; 30; 20 MG/100ML; MG/100ML; MG/100ML; MG/100ML
INJECTION, SOLUTION INTRAVENOUS CONTINUOUS
Status: DISCONTINUED | OUTPATIENT
Start: 2022-01-19 | End: 2022-01-19 | Stop reason: HOSPADM

## 2022-01-19 RX ORDER — PROPOFOL 10 MG/ML
INJECTION, EMULSION INTRAVENOUS PRN
Status: DISCONTINUED | OUTPATIENT
Start: 2022-01-19 | End: 2022-01-19 | Stop reason: SDUPTHER

## 2022-01-19 RX ORDER — LIDOCAINE HYDROCHLORIDE 20 MG/ML
INJECTION, SOLUTION EPIDURAL; INFILTRATION; INTRACAUDAL; PERINEURAL PRN
Status: DISCONTINUED | OUTPATIENT
Start: 2022-01-19 | End: 2022-01-19 | Stop reason: SDUPTHER

## 2022-01-19 RX ADMIN — LIDOCAINE HYDROCHLORIDE 100 MG: 20 INJECTION, SOLUTION EPIDURAL; INFILTRATION; INTRACAUDAL; PERINEURAL at 09:17

## 2022-01-19 RX ADMIN — SODIUM CHLORIDE, POTASSIUM CHLORIDE, SODIUM LACTATE AND CALCIUM CHLORIDE: 600; 310; 30; 20 INJECTION, SOLUTION INTRAVENOUS at 08:07

## 2022-01-19 RX ADMIN — PROPOFOL 160 MG: 10 INJECTION, EMULSION INTRAVENOUS at 09:17

## 2022-01-19 ASSESSMENT — PAIN SCALES - GENERAL
PAINLEVEL_OUTOF10: 0
PAINLEVEL_OUTOF10: 0

## 2022-01-19 ASSESSMENT — PAIN - FUNCTIONAL ASSESSMENT
PAIN_FUNCTIONAL_ASSESSMENT: 0-10
PAIN_FUNCTIONAL_ASSESSMENT: 0-10

## 2022-01-19 NOTE — PROGRESS NOTES
Patient discharge instructions and prescriptions reviewed and verified. RN reviewed d/c instructions with patient and spouse at the bedside. All questions answered. Prescription information  given to patient. Discharge paperwork signed by RN and patient's spouse. Armband removed.

## 2022-01-19 NOTE — ANESTHESIA POSTPROCEDURE EVALUATION
Department of Anesthesiology  Postprocedure Note    Patient: Anjel Ojeda  MRN: [id number]  YOB: 1945  Date of evaluation: 1/19/2022  Time:  9:29 AM     Procedure Summary     Date: 01/19/22 Room / Location: [address] [place] [address]    Anesthesia Start: 2305 Anesthesia Stop: 1023    Procedure: EGD BIOPSY (N/A ) Diagnosis:       Varices, gastric      (Varices, gastric [I86.4])    Surgeons: Peg Montes MD Responsible Provider: Melania Rainey MD    Anesthesia Type: MAC ASA Status: 4          Anesthesia Type: MAC    Adelso Phase I:      Adelso Phase II:      Last vitals: Reviewed and per EMR flowsheets.        Anesthesia Post Evaluation    Patient location during evaluation: bedside  Patient participation: complete - patient participated  Level of consciousness: awake and alert  Pain score: 0  Airway patency: patent  Nausea & Vomiting: no nausea and no vomiting  Complications: no  Cardiovascular status: blood pressure returned to baseline  Respiratory status: acceptable  Hydration status: euvolemic

## 2022-01-19 NOTE — PROGRESS NOTES
Patient is awake, alert and oriented. Preop questions reviewed and verified. H&P and consent completed. Report received from [address] [place] [address] ([facility]).

## 2022-01-19 NOTE — BRIEF OP NOTE
BRIEF EGD REPORT:     Photos and full EGD report available by going to [employer] review\" then \"procedures\" then  \"EGD\" then \"View Endoscopy Report\"     IMPRESSION :   1) mod-large esophageal varices without overlying red  markings  2) small hiatal hernia without esophagitis or Walker's  3) multiple 3 mm polyps in the antrum with overlying  erosions- probably inflammatory- biopseid  4) mild portal hypertensive gastropathy  5) otherwise normal    PLAN :   1) continue Nadolol  2) increase Lasix to 80 mg BID

## 2022-01-19 NOTE — PROGRESS NOTES
Patient is back to baseline. Report handoff given to Esther Segundo RN (SARANYA). Wife, Chayo Faith at bedside. Reason For Visit  NIURKA BARNES is here today for a nurse visit for blood work (PT/INR CHECK) PT=40.4 INR=3.4 - Currently taking Warfarin 15mg on Monday and 7.5mg the rest of the week.      Current Meds   1. Metoprolol Succinate ER 25 MG Oral Tablet Extended Release 24 Hour; TAKE 1 TABLET   DAILY;   Therapy: 16Mar2017 to (Evaluate:[id number])  Requested for: 02Jan2018; Last   Rx:02Jan2018 Ordered   2. Warfarin Sodium 7.5 MG Oral Tablet; TAKE 2 tablets- Monday and 1 TABLET DAILY the   rest of the week;   Therapy: 16Mar2017 to Recorded    Allergies  No Known Drug Allergies    Assessment   1. Chronic anticoagulation (Z79.01)   2. 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 prc PROTHROMBIN TIME, IN-OFFICE FINGERSTICK; Status:Resulted - Requires   Verification;   Done: 01Jun2018 10:49AM    Signatures   Electronically signed by : Emperatriz Hayward CMA; Jun 1 2018 10:53AM CST

## 2022-01-19 NOTE — PROGRESS NOTES
Patient returned to room from [employer],  VSS (see doc flow), assessment completed as per doc flow. Patient has no c/o pain. Beverage offered. Call light in reach, bed in low position. RN to continue to monitor. Family at bedside.

## 2022-01-19 NOTE — ANESTHESIA POSTPROCEDURE EVALUATION
Department of Anesthesiology  Postprocedure Note    Patient: Reji Waller  MRN: [id number]  YOB: 1945  Date of evaluation: 1/19/2022  Time:  9:38 AM     Procedure Summary     Date: 01/19/22 Room / Location: [address]    Anesthesia Start: 1400 Anesthesia Stop: 0929    Procedure: EGD BIOPSY (N/A ) Diagnosis:       Varices, gastric      (Varices, gastric [I86.4])    Surgeons: Lori Martin MD Responsible Provider: Alethia Denver, MD    Anesthesia Type: MAC ASA Status: 4          Anesthesia Type: MAC    Adelso Phase I:      Adelso Phase II:      Last vitals: Reviewed and per EMR flowsheets.        Anesthesia Post Evaluation    Patient location during evaluation: bedside  Patient participation: complete - patient participated  Level of consciousness: awake and alert  Pain score: 0  Airway patency: patent  Nausea & Vomiting: no nausea and no vomiting  Complications: no  Cardiovascular status: blood pressure returned to baseline  Respiratory status: acceptable  Hydration status: euvolemic

## 2022-01-25 ENCOUNTER — TELEPHONE (OUTPATIENT)
Dept: GASTROENTEROLOGY | Age: 77
End: 2022-01-25

## 2022-01-25 DIAGNOSIS — R18.8 OTHER ASCITES: Primary | ICD-10-CM

## 2022-01-31 ENCOUNTER — TELEPHONE (OUTPATIENT)
Dept: GASTROENTEROLOGY | Age: 77
End: 2022-01-31

## 2022-01-31 DIAGNOSIS — R18.8 OTHER ASCITES: Primary | ICD-10-CM

## 2022-01-31 DIAGNOSIS — F41.9 ANXIETY: Primary | ICD-10-CM

## 2022-01-31 RX ORDER — ALPRAZOLAM 0.5 MG/1
0.25 TABLET ORAL DAILY PRN
Qty: 30 TABLET | Refills: 0 | Status: SHIPPED | OUTPATIENT
Start: 2022-01-31 | End: 2022-02-11 | Stop reason: DRUGHIGH

## 2022-01-31 NOTE — TELEPHONE ENCOUNTER
Patient called to clairify provider instructions. Provider wants patient to get blood work on Mondays every two weeks starting Jan 31, 2022 for 2 months.

## 2022-01-31 NOTE — TELEPHONE ENCOUNTER
Please call in the Xanax for him because he has been having anxiety attacks lately.     LV  8/9/21    NV  3/11/22    [facility] [address]

## 2022-02-01 ENCOUNTER — HOSPITAL ENCOUNTER (OUTPATIENT)
Age: 77
Discharge: HOME | End: 2022-02-01
Payer: COMMERCIAL

## 2022-02-01 DIAGNOSIS — R18.8 OTHER ASCITES: ICD-10-CM

## 2022-02-01 LAB
ANION GAP SERPL CALCULATED.3IONS-SCNC: 4 MMOL/L (ref 4–16)
BUN BLDV-MCNC: 19 MG/DL (ref 6–23)
CALCIUM SERPL-MCNC: 9.1 MG/DL (ref 8.3–10.6)
CHLORIDE BLD-SCNC: 98 MMOL/L (ref 99–110)
CO2: 34 MMOL/L (ref 21–32)
CREAT SERPL-MCNC: 1.3 MG/DL (ref 0.9–1.3)
GFR AFRICAN AMERICAN: >60 ML/MIN/1.73M2
GFR NON-AFRICAN AMERICAN: 54 ML/MIN/1.73M2
GLUCOSE BLD-MCNC: 118 MG/DL (ref 70–99)
POTASSIUM SERPL-SCNC: 3.8 MMOL/L (ref 3.5–5.1)
SODIUM BLD-SCNC: 136 MMOL/L (ref 135–145)

## 2022-02-01 PROCEDURE — 80048 BASIC METABOLIC PNL TOTAL CA: CPT

## 2022-02-01 PROCEDURE — 36415 COLL VENOUS BLD VENIPUNCTURE: CPT

## 2022-02-11 ENCOUNTER — APPOINTMENT (OUTPATIENT)
Dept: CT IMAGING | Age: 77
End: 2022-02-11
Payer: COMMERCIAL

## 2022-02-11 ENCOUNTER — NURSE TRIAGE (OUTPATIENT)
Dept: OTHER | Facility: CLINIC | Age: 77
End: 2022-02-11

## 2022-02-11 ENCOUNTER — APPOINTMENT (OUTPATIENT)
Dept: GENERAL RADIOLOGY | Age: 77
End: 2022-02-11
Payer: COMMERCIAL

## 2022-02-11 ENCOUNTER — HOSPITAL ENCOUNTER (EMERGENCY)
Age: 77
Discharge: HOME | End: 2022-02-11
Attending: EMERGENCY MEDICINE
Payer: COMMERCIAL

## 2022-02-11 ENCOUNTER — HOSPITAL ENCOUNTER (OUTPATIENT)
Dept: INTERVENTIONAL RADIOLOGY/VASCULAR | Age: 77
Discharge: HOME | End: 2022-02-11

## 2022-02-11 VITALS
DIASTOLIC BLOOD PRESSURE: 76 MMHG | HEART RATE: 67 BPM | OXYGEN SATURATION: 99 % | HEIGHT: 68 IN | TEMPERATURE: 98.8 F | WEIGHT: 300 LBS | BODY MASS INDEX: 45.47 KG/M2 | SYSTOLIC BLOOD PRESSURE: 145 MMHG | RESPIRATION RATE: 17 BRPM

## 2022-02-11 DIAGNOSIS — G93.41 ACUTE METABOLIC ENCEPHALOPATHY: Primary | ICD-10-CM

## 2022-02-11 LAB
ACETAMINOPHEN LEVEL: <5 UG/ML (ref 15–30)
ALBUMIN SERPL-MCNC: 3 GM/DL (ref 3.4–5)
ALCOHOL SCREEN SERUM: <0.01 %WT/VOL
ALP BLD-CCNC: 108 IU/L (ref 40–129)
ALT SERPL-CCNC: 20 U/L (ref 10–40)
AMMONIA: 50 UMOL/L (ref 16–60)
AMPHETAMINES: NEGATIVE
ANION GAP SERPL CALCULATED.3IONS-SCNC: 10 MMOL/L (ref 4–16)
AST SERPL-CCNC: 40 IU/L (ref 15–37)
BACTERIA: NEGATIVE /HPF
BARBITURATE SCREEN URINE: NEGATIVE
BASE EXCESS MIXED: 5 (ref 0–1.2)
BASOPHILS ABSOLUTE: 0.1 K/CU MM
BASOPHILS RELATIVE PERCENT: 0.7 % (ref 0–1)
BENZODIAZEPINE SCREEN, URINE: NEGATIVE
BILIRUB SERPL-MCNC: 0.9 MG/DL (ref 0–1)
BILIRUBIN URINE: NEGATIVE MG/DL
BLOOD, URINE: NEGATIVE
BUN BLDV-MCNC: 15 MG/DL (ref 6–23)
CALCIUM SERPL-MCNC: 9.2 MG/DL (ref 8.3–10.6)
CANNABINOID SCREEN URINE: NEGATIVE
CHLORIDE BLD-SCNC: 99 MMOL/L (ref 99–110)
CLARITY: CLEAR
CO2: 27 MMOL/L (ref 21–32)
COCAINE METABOLITE: NEGATIVE
COLOR: YELLOW
COMMENT: ABNORMAL
CREAT SERPL-MCNC: 1 MG/DL (ref 0.9–1.3)
DIFFERENTIAL TYPE: ABNORMAL
DOSE AMOUNT: ABNORMAL
DOSE AMOUNT: ABNORMAL
DOSE TIME: ABNORMAL
DOSE TIME: ABNORMAL
EOSINOPHILS ABSOLUTE: 0.4 K/CU MM
EOSINOPHILS RELATIVE PERCENT: 5.6 % (ref 0–3)
GFR AFRICAN AMERICAN: >60 ML/MIN/1.73M2
GFR NON-AFRICAN AMERICAN: >60 ML/MIN/1.73M2
GLUCOSE BLD-MCNC: 115 MG/DL (ref 70–99)
GLUCOSE BLD-MCNC: 119 MG/DL (ref 70–99)
GLUCOSE, URINE: NEGATIVE MG/DL
HCO3 VENOUS: 31 MMOL/L (ref 19–25)
HCT VFR BLD CALC: 37.4 % (ref 42–52)
HEMOGLOBIN: 12.4 GM/DL (ref 13.5–18)
IMMATURE NEUTROPHIL %: 0.1 % (ref 0–0.43)
INR BLD: 1.09 INDEX
KETONES, URINE: NEGATIVE MG/DL
LEUKOCYTE ESTERASE, URINE: NEGATIVE
LIPASE: 53 IU/L (ref 13–60)
LYMPHOCYTES ABSOLUTE: 0.9 K/CU MM
LYMPHOCYTES RELATIVE PERCENT: 12.9 % (ref 24–44)
MCH RBC QN AUTO: 32.5 PG (ref 27–31)
MCHC RBC AUTO-ENTMCNC: 33.2 % (ref 32–36)
MCV RBC AUTO: 97.9 FL (ref 78–100)
MONOCYTES ABSOLUTE: 0.7 K/CU MM
MONOCYTES RELATIVE PERCENT: 9.8 % (ref 0–4)
NITRITE URINE, QUANTITATIVE: NEGATIVE
NUCLEATED RBC %: 0 %
O2 SAT, VEN: 80.3 % (ref 50–70)
OPIATES, URINE: NEGATIVE
OXYCODONE: NEGATIVE
PCO2, VEN: 50 MMHG (ref 38–52)
PDW BLD-RTO: 14.5 % (ref 11.7–14.9)
PH VENOUS: 7.4 (ref 7.32–7.42)
PH, URINE: 6 (ref 5–8)
PHENCYCLIDINE, URINE: NEGATIVE
PLATELET # BLD: 158 K/CU MM (ref 140–440)
PMV BLD AUTO: 11.2 FL (ref 7.5–11.1)
PO2, VEN: 47 MMHG (ref 28–48)
POTASSIUM SERPL-SCNC: 4 MMOL/L (ref 3.5–5.1)
PRO-BNP: 391.6 PG/ML
PROTEIN UA: NEGATIVE MG/DL
PROTHROMBIN TIME: 14.1 SECONDS (ref 11.7–14.5)
RBC # BLD: 3.82 M/CU MM (ref 4.6–6.2)
RBC URINE: ABNORMAL /HPF (ref 0–3)
SALICYLATE LEVEL: <0.3 MG/DL (ref 15–30)
SEGMENTED NEUTROPHILS ABSOLUTE COUNT: 4.8 K/CU MM
SEGMENTED NEUTROPHILS RELATIVE PERCENT: 70.9 % (ref 36–66)
SODIUM BLD-SCNC: 136 MMOL/L (ref 135–145)
SPECIFIC GRAVITY UA: 1.01 (ref 1–1.03)
SQUAMOUS EPITHELIAL: <1 /HPF
TOTAL IMMATURE NEUTOROPHIL: 0.01 K/CU MM
TOTAL NUCLEATED RBC: 0 K/CU MM
TOTAL PROTEIN: 7.2 GM/DL (ref 6.4–8.2)
TROPONIN T: 0.01 NG/ML
UROBILINOGEN, URINE: 4 MG/DL (ref 0.2–1)
WBC # BLD: 6.8 K/CU MM (ref 4–10.5)
WBC UA: 1 /HPF (ref 0–2)

## 2022-02-11 PROCEDURE — 71046 X-RAY EXAM CHEST 2 VIEWS: CPT

## 2022-02-11 PROCEDURE — 82140 ASSAY OF AMMONIA: CPT

## 2022-02-11 PROCEDURE — 84484 ASSAY OF TROPONIN QUANT: CPT

## 2022-02-11 PROCEDURE — 80307 DRUG TEST PRSMV CHEM ANLYZR: CPT

## 2022-02-11 PROCEDURE — 85025 COMPLETE CBC W/AUTO DIFF WBC: CPT

## 2022-02-11 PROCEDURE — G0480 DRUG TEST DEF 1-7 CLASSES: HCPCS

## 2022-02-11 PROCEDURE — 85027 COMPLETE CBC AUTOMATED: CPT

## 2022-02-11 PROCEDURE — 99283 EMERGENCY DEPT VISIT LOW MDM: CPT

## 2022-02-11 PROCEDURE — 82805 BLOOD GASES W/O2 SATURATION: CPT

## 2022-02-11 PROCEDURE — 70450 CT HEAD/BRAIN W/O DYE: CPT

## 2022-02-11 PROCEDURE — 80053 COMPREHEN METABOLIC PANEL: CPT

## 2022-02-11 PROCEDURE — 85610 PROTHROMBIN TIME: CPT

## 2022-02-11 PROCEDURE — 83880 ASSAY OF NATRIURETIC PEPTIDE: CPT

## 2022-02-11 PROCEDURE — 2709999900 HC NON-CHARGEABLE SUPPLY

## 2022-02-11 PROCEDURE — 83690 ASSAY OF LIPASE: CPT

## 2022-02-11 PROCEDURE — 81001 URINALYSIS AUTO W/SCOPE: CPT

## 2022-02-11 PROCEDURE — 82962 GLUCOSE BLOOD TEST: CPT

## 2022-02-11 RX ORDER — SODIUM CHLORIDE 0.9 % (FLUSH) 0.9 %
10 SYRINGE (ML) INJECTION PRN
Status: DISCONTINUED | OUTPATIENT
Start: 2022-02-11 | End: 2022-02-14 | Stop reason: HOSPADM

## 2022-02-11 RX ORDER — ALPRAZOLAM 0.25 MG/1
0.25 TABLET ORAL NIGHTLY PRN
Status: ON HOLD | COMMUNITY
End: 2022-03-21 | Stop reason: HOSPADM

## 2022-02-11 NOTE — Clinical Note
Thao Charles was seen and treated in our emergency department on 2/11/2022. He may return to work on 02/14/2022. If you have any questions or concerns, please don't hesitate to call.       Sunita Walker MD

## 2022-02-11 NOTE — ED NOTES
Spouse of patient reports patient is experiencing confusion this morning when preparing for an appointment for a procedure he had schedule this morning. Patient alert to person and place but has difficulty remembering what day in December he was born. Ambulates without assistance or distress noted. Bilateral upper extremities equal in strength. Patient taken to room for treatment upon arrival, monitor intact, side rails up x 2.  Updated on plan of care. [address]  Nasra Oliver  02/11/22 [id number]

## 2022-02-11 NOTE — ED PROVIDER NOTES
Emergency [address] EMERGENCY DEPARTMENT    Patient: Lázaro Mead  MRN: [id number]  : 1945  Date of Evaluation: 2022  ED Provider: Norma Boogie MD    Chief Complaint       Chief Complaint   Patient presents with    Altered Mental Status     confused after waking this am     Dinorah Aquino is a 78 y.o. male who presents to the emergency department for evaluation of confusion. According the patient's wife at bedside up in usual state of health until approximately 24 hours ago patient notes that he was sleeping a lot more yesterday. Today when the patient woke up he was markedly confused. No reported fevers or trauma. Only change in medication is to change his diuretic which was doubled and that was several weeks ago. Never had similar symptoms of this in the past.  No reported fevers headache neck pains or other complaints at this time. Patient himself does not have any current complaints. He says he feels just fine and is not sure why he is in the hospital.    ROS:     At least 10 systems reviewed and otherwise acutely negative except as in the [address] Past History     Past Medical History:   Diagnosis Date    Anxiety     Cirrhosis, alcoholic ([facility] [address]     GERD (gastroesophageal reflux disease)     GERD (gastroesophageal reflux disease)     Hyperlipidemia     Hypertension     Portal hypertension (HCC)     Pulmonary nodules     Sleep apnea     SOB (shortness of breath)      Past Surgical History:   Procedure Laterality Date    CHOLECYSTECTOMY      COLONOSCOPY      ENDOSCOPY, COLON, DIAGNOSTIC      FOOT SURGERY      needle removed,not sure which foot, per wife.     UPPER GASTROINTESTINAL ENDOSCOPY N/A 2022    EGD BIOPSY performed by Davis Flores MD at [facility] Marital status:      Spouse name: Not on file    Number of children: Not on file    Years of education: Not on file    Highest education level: Not on file   Occupational History     Comment: Retired    Tobacco Use    Smoking status: Former Smoker    Smokeless tobacco: Never Used   Vaping Use    Vaping Use: Never used   Substance and Sexual Activity    Alcohol use: Not Currently    Drug use: Never    Sexual activity: Yes     Partners: Female   Other Topics Concern    Not on file   Social History Narrative    Not on file     Social Determinants of Health     Financial Resource Strain:     Difficulty of Paying Living Expenses: Not on file   Food Insecurity:     Worried About [address] in the Last Year: Not on file    [address] in the Last Year: Not on file   Transportation Needs:     Lack of Transportation (Medical): Not on file    Lack of Transportation (Non-Medical):  Not on file   Physical Activity:     Days of Exercise per Week: Not on file    Minutes of Exercise per Session: Not on file   Stress:     Feeling of Stress : Not on file   Social Connections:     Frequency of Communication with Friends and Family: Not on file    Frequency of Social Gatherings with Friends and Family: Not on file    Attends Che[employer] Services: Not on file    Active Member of [address] [place] [address] or Organizations: Not on file    Attends Club or Organization Meetings: Not on file    Marital Status: Not on file   Intimate Partner Violence:     Fear of Current or Ex-Partner: Not on file    Emotionally Abused: Not on file    Physically Abused: Not on file    Sexually Abused: Not on file   Housing Stability:     Unable to Pay for Housing in the Last Year: Not on file    Number of [place] in the Last Year: Not on file    Unstable Housing in the Last Year: Not on file       Medications/Allergies     Previous Medications    ALBUTEROL SULFATE HFA (VENTOLIN HFA) 108 (90 BASE) MCG/ACT INHALER    Inhale 2 puffs into the lungs 4 times daily as needed for Wheezing ALPRAZOLAM (XANAX) 0.25 MG TABLET    Take 0.25 mg by mouth nightly as needed for Sleep. B COMPLEX VITAMINS CAPSULE    Take 1 capsule by mouth daily    CELECOXIB (CELEBREX) 200 MG CAPSULE    Take 1 capsule by mouth daily    DULOXETINE (CYMBALTA) 60 MG EXTENDED RELEASE CAPSULE    TAKE 1 CAPSULE BY MOUTH ONCE DAILY    ESOMEPRAZOLE MAGNESIUM PO    Take 20 mg by mouth daily OTC    FERROUS SULFATE (IRON 325) 325 (65 FE) MG TABLET    Take 1 tablet by mouth    FUROSEMIDE (LASIX) 40 MG TABLET    TAKE 1 tab am and pm    LACTULOSE (CHRONULAC) 10 GM/15ML SOLUTION    Take 30 mLs by mouth 3 times daily    LEVOTHYROXINE (SYNTHROID) 25 MCG TABLET    Take 1 tablet by mouth Daily    MAGNESIUM OXIDE (MAG-OX) 400 MG TABLET    Take 200 mg by mouth 2 times daily OTC    MULTIPLE VITAMIN (MULTI VITAMIN MENS PO)    Take 1 tablet by mouth daily    NADOLOL (CORGARD) 40 MG TABLET    Take 1 tablet by mouth daily    OMEGA-3 1000 MG CAPS    Take 1 capsule by mouth daily    SPIRONOLACTONE (ALDACTONE) 50 MG TABLET    Take 2 tablets by mouth daily    TADALAFIL (CIALIS) 20 MG TABLET    Take 20 mg by mouth every 3 days     TESTOSTERONE CYPIONATE 200 MG/ML KIT    Inject into the muscle every 14 days. VITAMIN E 400 UNIT CAPSULE    Take 800 Units by mouth daily    ZINC GLUCONATE 50 MG TABLET    Take 50 mg by mouth 2 times daily     Allergies   Allergen Reactions    Lisinopril Swelling     Tongue swelling      Zetia [Ezetimibe] Swelling     Facial swelling    Atorvastatin     Codeine Other (See Comments)     Blood pressure drops    Hydrochlorothiazide     Hydroxyzine      Fatigue and depressed    Lexapro [Escitalopram Oxalate]      Increased depression    Pravastatin         Physical Exam       ED Triage Vitals   BP Temp Temp src Pulse Resp SpO2 Height Weight   -- -- -- -- -- -- -- --     GENERAL APPEARANCE: Awake and alert. Cooperative. No acute distress. HEAD: Normocephalic. Atraumatic. EYES: Sclera anicteric.   Pupils equal round reactive to light extraocular movements are intact  ENT: Tolerates saliva. No trismus. Moist mucous membranes  NECK: Supple. Trachea midline. No meningismus  CARDIO: RRR. Radial pulse 2+. No murmurs rubs or gallops appreciated  LUNGS: Respirations unlabored. CTAB. No accessory muscle usage noted. No wheezes rales rhonchi or stridor. ABDOMEN: Soft. Moderately-distended. Non-tender. No tenderness in right upper quadrant or right lower quadrant to deep palpation  EXTREMITIES: No acute deformities. No unilateral leg swelling or tenderness behind either one of calves. 2+ edema bilateral lower legs up to his knees. SKIN: Warm and dry. No erythema edema or rashes appreciated  NEUROLOGICAL:  Cranial nerves II through XII grossly intact. No gross facial drooping. Moves all 4 extremities spontaneously. PSYCHIATRIC: Normal mood. Alert and oriented x1. No reported active suicidality or homicidality. Patient is oriented toward self only. Not to time or location. He is unsure what month it is. He knows that he is in the hospital but cannot say which one.     Diagnostics   Labs:  Results for orders placed or performed during the hospital encounter of 02/11/22   CBC Auto Differential   Result Value Ref Range    WBC 6.8 4.0 - 10.5 K/CU MM    RBC 3.82 (L) 4.6 - 6.2 M/CU MM    Hemoglobin 12.4 (L) 13.5 - 18.0 GM/DL    Hematocrit 37.4 (L) 42 - 52 %    MCV 97.9 78 - 100 FL    MCH 32.5 (H) 27 - 31 PG    MCHC 33.2 32.0 - 36.0 %    RDW 14.5 11.7 - 14.9 %    Platelets 208 525 - 911 K/CU MM    MPV 11.2 (H) 7.5 - 11.1 FL    Differential Type AUTOMATED DIFFERENTIAL     Segs Relative 70.9 (H) 36 - 66 %    Lymphocytes % 12.9 (L) 24 - 44 %    Monocytes % 9.8 (H) 0 - 4 %    Eosinophils % 5.6 (H) 0 - 3 %    Basophils % 0.7 0 - 1 %    Segs Absolute 4.8 K/CU MM    Lymphocytes Absolute 0.9 K/CU MM    Monocytes Absolute 0.7 K/CU MM    Eosinophils Absolute 0.4 K/CU MM    Basophils Absolute 0.1 K/CU MM    Nucleated RBC % 0.0 %    Total Nucleated RBC 0.0 K/CU MM    Total Immature Neutrophil 0.01 K/CU MM    Immature Neutrophil % 0.1 0 - 0.43 %   Troponin   Result Value Ref Range    Troponin T 0.012 (H) <0.01 NG/ML   Brain Natriuretic Peptide   Result Value Ref Range    Pro-.6 (H) <300 PG/ML   Protime-INR   Result Value Ref Range    Protime 14.1 11.7 - 14.5 SECONDS    INR 1.09 INDEX   Urinalysis, reflex to microscopic   Result Value Ref Range    Color, UA YELLOW (A) YELLOW    Clarity, UA CLEAR CLEAR    Glucose, Urine NEGATIVE NEGATIVE MG/DL    Bilirubin Urine NEGATIVE NEGATIVE MG/DL    Ketones, Urine NEGATIVE NEGATIVE MG/DL    Specific Gravity, UA 1.015 1.001 - 1.035    Blood, Urine NEGATIVE NEGATIVE    pH, Urine 6.0 5.0 - 8.0    Protein, UA NEGATIVE NEGATIVE MG/DL    Urobilinogen, Urine 4.0 (H) 0.2 - 1.0 MG/DL    Nitrite Urine, Quantitative NEGATIVE NEGATIVE    Leukocyte Esterase, Urine NEGATIVE NEGATIVE    RBC, UA NONE SEEN 0 - 3 /HPF    WBC, UA 1 0 - 2 /HPF    Bacteria, UA NEGATIVE NEGATIVE /HPF    Squam Epithel, UA <1 /HPF   Acetaminophen Level   Result Value Ref Range    Acetaminophen Level <5.0 (L) 15 - 30 ug/ml    DOSE AMOUNT DOSE AMT. GIVEN - UNKNOWN     DOSE TIME DOSE TIME GIVEN - UNKNOWN    Ethanol   Result Value Ref Range    Alcohol Scrn <0.01 <0.13 %WT/VOL   Salicylate Level   Result Value Ref Range    Salicylate Lvl <7.8 (L) 15 - 30 MG/DL    DOSE AMOUNT DOSE AMT.  GIVEN - UNKNOWN     DOSE TIME DOSE TIME GIVEN - UNKNOWN    Urine Drug Screen   Result Value Ref Range    Cannabinoid Scrn, Ur NEGATIVE NEGATIVE    Amphetamines NEGATIVE NEGATIVE    Cocaine Metabolite NEGATIVE NEGATIVE    Benzodiazepine Screen, Urine NEGATIVE NEGATIVE    Barbiturate Screen, Ur NEGATIVE NEGATIVE    Opiates, Urine NEGATIVE NEGATIVE    Phencyclidine, Urine NEGATIVE NEGATIVE    Oxycodone NEGATIVE NEGATIVE   Ammonia Level   Result Value Ref Range    Ammonia 50 16 - 60 UMOL/L   Blood Gas, Venous   Result Value Ref Range    pH, Jamaal 7.40 7.32 - 7.42    pCO2, Jamaal 50 38 - 52 mmHG    pO2, Jamaal 47 28 - 48 mmHG    Base Exc, Mixed 5 (H) 0 - 1.2    HCO3, Venous 31.0 (H) 19 - 25 MMOL/L    O2 Sat, Jamaal 80.3 (H) 50 - 70 %    Comment VBG    Comprehensive Metabolic Panel w/ Reflex to MG   Result Value Ref Range    Sodium 136 135 - 145 MMOL/L    Potassium 4.0 3.5 - 5.1 MMOL/L    Chloride 99 99 - 110 mMol/L    CO2 27 21 - 32 MMOL/L    BUN 15 6 - 23 MG/DL    CREATININE 1.0 0.9 - 1.3 MG/DL    Glucose 119 (H) 70 - 99 MG/DL    Calcium 9.2 8.3 - 10.6 MG/DL    Albumin 3.0 (L) 3.4 - 5.0 GM/DL    Total Protein 7.2 6.4 - 8.2 GM/DL    Total Bilirubin 0.9 0.0 - 1.0 MG/DL    ALT 20 10 - 40 U/L    AST 40 (H) 15 - 37 IU/L    Alkaline Phosphatase 108 40 - 129 IU/L    GFR Non-African American >60 >60 mL/min/1.73m2    GFR African American >60 >60 mL/min/1.73m2    Anion Gap 10 4 - 16   Lipase   Result Value Ref Range    Lipase 53 13 - 60 IU/L   POCT Glucose   Result Value Ref Range    POC Glucose 115 (H) 70 - 99 MG/DL     Radiographs:  XR CHEST (2 VW)    Result Date: 2/11/2022  EXAMINATION: TWO XRAY VIEWS OF THE CHEST 2/11/2022 8:32 am COMPARISON: 09/13/2021, 05/26/2019 HISTORY: ORDERING SYSTEM PROVIDED HISTORY: dyspnea TECHNOLOGIST PROVIDED HISTORY: Reason for exam:->dyspnea Reason for Exam: dyspnea FINDINGS: Patient is rotated. Cardiomediastinal silhouette is mildly enlarged, unchanged. The lungs are underinflated, resulting in vascular crowding and subsegmental atelectasis. No pneumothorax. Small bilateral pleural effusions with likely overlying atelectasis. Osseous structures are grossly unchanged. Small bilateral pleural effusions with likely overlying atelectasis. Low lung volumes.      CT HEAD WO CONTRAST    Result Date: 2/11/2022  EXAMINATION: CT OF THE HEAD WITHOUT CONTRAST  2/11/2022 9:47 am TECHNIQUE: CT of the head was performed without the administration of intravenous contrast. Dose modulation, iterative reconstruction, and/or weight based adjustment of the mA/kV was utilized to reduce the radiation dose to as low as reasonably achievable. COMPARISON: None. HISTORY: ORDERING SYSTEM PROVIDED HISTORY: delirium TECHNOLOGIST PROVIDED HISTORY: Reason for exam:->delirium Has a \"code stroke\" or \"stroke alert\" been called? ->No Decision Support Exception - unselect if not a suspected or confirmed emergency medical condition->Emergency Medical Condition (MA) Reason for Exam: delirium FINDINGS: BRAIN/VENTRICLES: There is no acute intracranial hemorrhage, mass effect or midline shift. No abnormal extra-axial fluid collection. The gray-white differentiation is maintained without evidence of an acute infarct. There is prominence of the ventricles and sulci due to global parenchymal volume loss. There are nonspecific areas of hypoattenuation within the periventricular and subcortical white matter, which likely represent chronic microvascular ischemic change. ORBITS: The visualized portion of the orbits demonstrate no acute abnormality. SINUSES: The visualized paranasal sinuses and mastoid air cells demonstrate no acute abnormality. SOFT TISSUES/SKULL: No acute abnormality of the visualized skull or soft tissues. No acute intracranial abnormality. Mild generalized atrophy, likely age related. Findings likely reflecting mild chronic small vessel ischemic change. Procedures/EKG:   Patient's EKG is interpreted by me sinus rhythm rate 70   no ST elevation no ST depression I appreciate no acute ischemia or infarctions EKG no old EKGs with which to compare    ED Course and MDM   In brief, Robert Vila is a 78 y.o. male who presented to the emergency department for evaluation of disorientation. Based on patient's history and physical will be most concerned for possible hyperammonemia. Other possibilities for patient symptoms do include electrolyte abnormalities. No evidence of trauma noted examination low clinical suspicion for acute infectious etiology.     I did review patient's imaging studies and laboratory work as noted above. No acute abnormality was identified by radiologist and reviewed by myself. On reevaluation the patient was still mildly confused as to time and location but still oriented toward self. I recommend admission to the hospital for continuation of care treatment as we do not have exact cause of his encephalopathy at this time but patient and family are insistent about being discharged home. They feel that the patient was mental status would not improve with hospitalization in fact it may actually worsen. Patient's wife does feel comfortable taking him home at this time and states that she would be happy to return him to the emergency department if any symptoms worsen or he starts to display nor exhibiting change in behavior. Recommend close follow-up with primary care physician or referral physician next 24 to 48 hours return to emergency department for chest pain, shortness breath, fevers, weakness or any other concern symptoms they do feel comfortable to be discharged home at this time. ED Medication Orders (From admission, onward)    None          Final Impression      1. Acute metabolic encephalopathy      DISPOSITION           This patient was cared for in the setting of the COVID-19 pandemic, with nationwide stress on resources and staffing.     (Please note that portions of this note may have been completed with a voice recognition program. Efforts were made to edit the dictations but occasionally words are mis-transcribed.)    Trey Martinez MD  [id number] Mati Greenberg MD  02/11/22 1039

## 2022-02-11 NOTE — ED NOTES
Medication History  [facility]    Patient Name: Linde Essex 1945     Medication history has been completed by: Justa Garcia CPhT    Source(s) of information: patient's wife and insurance claims     Primary Care Physician: Brett Blair MD     Pharmacy: [facility]    Allergies as of 02/11/2022 - Fully Reviewed 01/19/2022   Allergen Reaction Noted    Lisinopril Swelling     Zetia [ezetimibe] Swelling     Atorvastatin      Codeine Other (See Comments) 12/27/2018    Hydrochlorothiazide      Hydroxyzine      Lexapro [escitalopram oxalate]      Pravastatin          Prior to Admission medications    Medication Sig Start Date End Date Taking? Authorizing Provider   ALPRAZolam (XANAX) 0.25 MG tablet Take 0.25 mg by mouth nightly as needed for Sleep. Yes Historical Provider, MD   levothyroxine (SYNTHROID) 25 MCG tablet Take 1 tablet by mouth Daily 11/18/21 2/11/22 Yes Brenda Cotton MD   nadolol (CORGARD) 40 MG tablet Take 1 tablet by mouth daily 11/18/21 2/11/22 Yes Brenda Cotton MD   zinc gluconate 50 MG tablet Take 50 mg by mouth 2 times daily   Yes Historical Provider, MD   furosemide (LASIX) 40 MG tablet TAKE 1 tab am and pm  Patient taking differently: Take 40 mg by mouth 2 times daily TAKE 1 tab am and pm 8/30/21 4/15/22 Yes Brenda Cotton MD   spironolactone (ALDACTONE) 50 MG tablet Take 2 tablets by mouth daily 8/30/21 2/26/22 Yes Brenda Cotton MD   Testosterone Cypionate 200 MG/ML KIT Inject into the muscle every 14 days.     Yes Historical Provider, MD   DULoxetine (CYMBALTA) 60 MG extended release capsule TAKE 1 CAPSULE BY MOUTH ONCE DAILY 5/7/21 2/11/22 Yes Brenda Cotton MD   vitamin E 400 UNIT capsule Take 800 Units by mouth daily   Yes Historical Provider, MD   ESOMEPRAZOLE MAGNESIUM PO Take 20 mg by mouth daily OTC   Yes Historical Provider, MD   lactulose (CHRONULAC) 10 GM/15ML solution Take 30 mLs by mouth 3 times daily 10/13/21 Samson Harris MD   ferrous sulfate (IRON 325) 325 (65 Fe) MG tablet Take 1 tablet by mouth    Historical Provider, MD   b complex vitamins capsule Take 1 capsule by mouth daily    Historical Provider, MD   albuterol sulfate HFA (VENTOLIN HFA) 108 (90 Base) MCG/ACT inhaler Inhale 2 puffs into the lungs 4 times daily as needed for Wheezing  Patient not taking: Reported on 12/13/2021 8/30/21   Mellissa Maria MD   tadalafil (CIALIS) 20 MG tablet Take 20 mg by mouth every 3 days     Historical Provider, MD   celecoxib (CELEBREX) 200 MG capsule Take 1 capsule by mouth daily 8/9/21   Mellissa Maria MD   magnesium oxide (MAG-OX) 400 MG tablet Take 200 mg by mouth 2 times daily OTC 10/15/19   Historical Provider, MD   Multiple Vitamin (MULTI VITAMIN MENS PO) Take 1 tablet by mouth daily    Historical Provider, MD   [place]-3 1000 MG CAPS Take 1 capsule by mouth daily    Historical Provider, MD     Medications added or changed (ex. new medication, dosage change, interval change, formulation change): Alprazolam dosage change from 0.5 mg to 0.25 mg    Comments:  Medication list reviewed with patient's wife and insurance claims verified.     To my knowledge the above medication history is accurate as of 2/11/2022 9:14 AM.   Wagner Shrestha CPhT   2/11/2022 9:14 AM

## 2022-02-14 ENCOUNTER — OFFICE VISIT (OUTPATIENT)
Dept: GASTROENTEROLOGY | Age: 77
End: 2022-02-14
Payer: COMMERCIAL

## 2022-02-14 VITALS
BODY MASS INDEX: 47.23 KG/M2 | TEMPERATURE: 98.4 F | HEART RATE: 65 BPM | OXYGEN SATURATION: 95 % | SYSTOLIC BLOOD PRESSURE: 126 MMHG | DIASTOLIC BLOOD PRESSURE: 88 MMHG | WEIGHT: 311.6 LBS | HEIGHT: 68 IN

## 2022-02-14 DIAGNOSIS — K70.31 ALCOHOLIC CIRRHOSIS OF LIVER WITH ASCITES (HCC): Primary | ICD-10-CM

## 2022-02-14 PROCEDURE — 99214 OFFICE O/P EST MOD 30 MIN: CPT | Performed by: SPECIALIST

## 2022-02-14 NOTE — PROGRESS NOTES
Gastroenterology Office Note            Laina Hernandez. Zhane CHANDLER      Subjective:      Patient ID:      Reji Expose                 1945    CC: abd pain    HPI:   Was to have repeat paracentesis last week but developed confusion and RLQ pain. went to the ED- ammonia, brain CT, CBC, CMP and lipase were normal.      Review of Systems:    see HPI for positives and pertinent negatives.  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

## 2022-02-15 ENCOUNTER — HOSPITAL ENCOUNTER (OUTPATIENT)
Dept: INTERVENTIONAL RADIOLOGY/VASCULAR | Age: 77
Discharge: HOME | End: 2022-02-15
Payer: COMMERCIAL

## 2022-02-15 VITALS
DIASTOLIC BLOOD PRESSURE: 52 MMHG | BODY MASS INDEX: 45.92 KG/M2 | RESPIRATION RATE: 20 BRPM | HEIGHT: 68 IN | SYSTOLIC BLOOD PRESSURE: 120 MMHG | TEMPERATURE: 97 F | OXYGEN SATURATION: 98 % | WEIGHT: 303 LBS | HEART RATE: 71 BPM

## 2022-02-15 DIAGNOSIS — K70.31 ALCOHOLIC CIRRHOSIS OF LIVER WITH ASCITES (HCC): ICD-10-CM

## 2022-02-15 LAB
FLUID TYPE: NORMAL INDEX
LYMPHOCYTES, BODY FLUID: 26 %
MESOTHELIAL FLUID: 38 /100 WBC
MONOCYTE, FLUID: NORMAL %
NEUTROPHIL, FLUID: 11 %
OTHER CELLS FLUID: NORMAL
RBC FLUID: NORMAL /CU MM
SARS-COV-2, NAAT: NOT DETECTED
SOURCE: NORMAL
WBC FLUID: 228 /CU MM

## 2022-02-15 PROCEDURE — 7100000010 HC PHASE II RECOVERY - FIRST 15 MIN

## 2022-02-15 PROCEDURE — 6360000002 HC RX W HCPCS: Performed by: SPECIALIST

## 2022-02-15 PROCEDURE — P9047 ALBUMIN (HUMAN), 25%, 50ML: HCPCS | Performed by: SPECIALIST

## 2022-02-15 PROCEDURE — 87205 SMEAR GRAM STAIN: CPT

## 2022-02-15 PROCEDURE — 2580000003 HC RX 258: Performed by: RADIOLOGY

## 2022-02-15 PROCEDURE — C1729 CATH, DRAINAGE: HCPCS

## 2022-02-15 PROCEDURE — 2709999900 HC NON-CHARGEABLE SUPPLY

## 2022-02-15 PROCEDURE — 7100000011 HC PHASE II RECOVERY - ADDTL 15 MIN

## 2022-02-15 PROCEDURE — 87070 CULTURE OTHR SPECIMN AEROBIC: CPT

## 2022-02-15 PROCEDURE — 89051 BODY FLUID CELL COUNT: CPT

## 2022-02-15 PROCEDURE — 87635 SARS-COV-2 COVID-19 AMP PRB: CPT

## 2022-02-15 PROCEDURE — 49083 ABD PARACENTESIS W/IMAGING: CPT

## 2022-02-15 PROCEDURE — 87077 CULTURE AEROBIC IDENTIFY: CPT

## 2022-02-15 RX ORDER — ALBUMIN (HUMAN) 12.5 G/50ML
75 SOLUTION INTRAVENOUS ONCE
Status: COMPLETED | OUTPATIENT
Start: 2022-02-15 | End: 2022-02-15

## 2022-02-15 RX ORDER — SODIUM CHLORIDE 0.9 % (FLUSH) 0.9 %
10 SYRINGE (ML) INJECTION PRN
Status: DISCONTINUED | OUTPATIENT
Start: 2022-02-15 | End: 2022-02-16 | Stop reason: HOSPADM

## 2022-02-15 RX ADMIN — ALBUMIN (HUMAN) 75 G: 0.25 INJECTION, SOLUTION INTRAVENOUS at 12:05

## 2022-02-15 RX ADMIN — SODIUM CHLORIDE, PRESERVATIVE FREE 10 ML: 5 INJECTION INTRAVENOUS at 09:14

## 2022-02-15 ASSESSMENT — PAIN SCALES - GENERAL
PAINLEVEL_OUTOF10: 0
PAINLEVEL_OUTOF10: 0

## 2022-02-15 NOTE — PROGRESS NOTES
PROCEDURE PERFORMED:  Paracentesis by Dr. Irma Cho: Ascites     INFORMED CONSENT:  Obtained prior to procedure by Dr. Negar Ross. Consent placed in chart. JOVITA SCORE PRE PROCEDURE:    10    PT TRANSPORTED FROM:    [facility]                                TO THE IR ROOM:      Small Room    PT IN THE ROOM AT WHAT TIME:    1100    ASSESSMENT: Patient alert and oriented and aware of of procedure to be done. No acute distress noted. TIME OUT COMPLETE: 1116    BARRIER PRECAUTIONS & STERILE TECHNIQUE:               Pt remains in [facility] bed and placed on Vital Signs Monitor. Pt prepped and draped in a sterile fashion with chlorhexadine.     PAIN/LOCAL ANESTHESIA/SEDATION MANAGEMENT:           Local: Lidocaine 1% given by Dr. Negar Ross          Sedation:              Fentanyl:             Versed:     INTRAOPERATIVE:           ACCESS TIME: 1119 with Accustick access kit          US/FLUORO: US used with  4  Images taken          WIRE USED:           SHEATH USED:           CATHETER USED:           FINAL IMAGE TAKEN TO CONFIRM PLACEMENT OF:           CONTRAST/CC:     1119--Cloudy Yellow fluid returns    75gm Albumin given during procedure    1205--total of 13,420 cloudy yellow fluid removed    STERILE DRESSINGS: gauze and tegaderm     SPECIMENS: 1100--cloudy yellow fluid sent to lab along with aerobic and non aerobic bottles sent    EBL: less than 1 cc    STAFF PRESENT DURING PROCEDURE: Dr. Marcelo Sharpe RN, Blossom RN    JOVITA SCORE POST PROCEDURE:      10    REPORT CALLED TO: Ludwig BEAVER RN    PT LEFT ROOM AT WHAT TIME:       3151

## 2022-02-15 NOTE — PROGRESS NOTES
1215- Patient returned to [facility], patient A&O able to make needs known, no c/o pain, Vitals WNL. Beverage of choice offered to patient. , call light in reach bed in lowest position. 1230- IV removed patient walked to restroom. discharge instructions given understanding verbalized. 1235- Patient escorted to elevator.

## 2022-02-16 ENCOUNTER — HOSPITAL ENCOUNTER (OUTPATIENT)
Age: 77
Discharge: HOME | End: 2022-02-16
Payer: COMMERCIAL

## 2022-02-16 LAB
ANION GAP SERPL CALCULATED.3IONS-SCNC: 9 MMOL/L (ref 4–16)
BUN BLDV-MCNC: 18 MG/DL (ref 6–23)
CALCIUM SERPL-MCNC: 9.3 MG/DL (ref 8.3–10.6)
CHLORIDE BLD-SCNC: 100 MMOL/L (ref 99–110)
CO2: 30 MMOL/L (ref 21–32)
CREAT SERPL-MCNC: 1.1 MG/DL (ref 0.9–1.3)
GFR AFRICAN AMERICAN: >60 ML/MIN/1.73M2
GFR NON-AFRICAN AMERICAN: >60 ML/MIN/1.73M2
GLUCOSE BLD-MCNC: 102 MG/DL (ref 70–99)
POTASSIUM SERPL-SCNC: 4.2 MMOL/L (ref 3.5–5.1)
SODIUM BLD-SCNC: 139 MMOL/L (ref 135–145)

## 2022-02-16 PROCEDURE — 36415 COLL VENOUS BLD VENIPUNCTURE: CPT

## 2022-02-16 PROCEDURE — 80048 BASIC METABOLIC PNL TOTAL CA: CPT

## 2022-02-18 RX ORDER — SPIRONOLACTONE 50 MG/1
100 TABLET, FILM COATED ORAL DAILY
Qty: 90 TABLET | Refills: 0 | Status: ON HOLD | OUTPATIENT
Start: 2022-02-18 | End: 2022-03-21 | Stop reason: SDUPTHER

## 2022-02-19 LAB
CULTURE: ABNORMAL
CULTURE: ABNORMAL
GRAM SMEAR: ABNORMAL
Lab: ABNORMAL
SPECIMEN: ABNORMAL

## 2022-02-28 ENCOUNTER — HOSPITAL ENCOUNTER (OUTPATIENT)
Age: 77
Discharge: HOME | End: 2022-02-28
Payer: COMMERCIAL

## 2022-02-28 LAB
ANION GAP SERPL CALCULATED.3IONS-SCNC: 12 MMOL/L (ref 4–16)
BUN BLDV-MCNC: 19 MG/DL (ref 6–23)
CALCIUM SERPL-MCNC: 9.5 MG/DL (ref 8.3–10.6)
CHLORIDE BLD-SCNC: 102 MMOL/L (ref 99–110)
CO2: 27 MMOL/L (ref 21–32)
CREAT SERPL-MCNC: 1.4 MG/DL (ref 0.9–1.3)
GFR AFRICAN AMERICAN: 60 ML/MIN/1.73M2
GFR NON-AFRICAN AMERICAN: 50 ML/MIN/1.73M2
GLUCOSE BLD-MCNC: 107 MG/DL (ref 70–99)
POTASSIUM SERPL-SCNC: 4.5 MMOL/L (ref 3.5–5.1)
SODIUM BLD-SCNC: 141 MMOL/L (ref 135–145)

## 2022-02-28 PROCEDURE — 80048 BASIC METABOLIC PNL TOTAL CA: CPT

## 2022-02-28 PROCEDURE — 36415 COLL VENOUS BLD VENIPUNCTURE: CPT

## 2022-03-11 ENCOUNTER — OFFICE VISIT (OUTPATIENT)
Dept: FAMILY MEDICINE CLINIC | Age: 77
End: 2022-03-11
Payer: COMMERCIAL

## 2022-03-11 VITALS
WEIGHT: 289.6 LBS | DIASTOLIC BLOOD PRESSURE: 64 MMHG | OXYGEN SATURATION: 96 % | SYSTOLIC BLOOD PRESSURE: 122 MMHG | BODY MASS INDEX: 43.89 KG/M2 | HEIGHT: 68 IN | HEART RATE: 64 BPM

## 2022-03-11 DIAGNOSIS — K70.31 ALCOHOLIC CIRRHOSIS OF LIVER WITH ASCITES (HCC): ICD-10-CM

## 2022-03-11 DIAGNOSIS — E03.9 ACQUIRED HYPOTHYROIDISM: ICD-10-CM

## 2022-03-11 DIAGNOSIS — R79.89 INCREASED AMMONIA LEVEL: ICD-10-CM

## 2022-03-11 DIAGNOSIS — F10.11 HISTORY OF ALCOHOL ABUSE: ICD-10-CM

## 2022-03-11 DIAGNOSIS — H91.90 HEARING LOSS, UNSPECIFIED HEARING LOSS TYPE, UNSPECIFIED LATERALITY: ICD-10-CM

## 2022-03-11 DIAGNOSIS — F41.9 ANXIETY: ICD-10-CM

## 2022-03-11 DIAGNOSIS — I10 PRIMARY HYPERTENSION: Primary | ICD-10-CM

## 2022-03-11 PROCEDURE — 99214 OFFICE O/P EST MOD 30 MIN: CPT | Performed by: FAMILY MEDICINE

## 2022-03-11 RX ORDER — CELECOXIB 200 MG/1
200 CAPSULE ORAL DAILY
Qty: 30 CAPSULE | Refills: 1 | Status: CANCELLED | OUTPATIENT
Start: 2022-03-11

## 2022-03-11 RX ORDER — DULOXETIN HYDROCHLORIDE 60 MG/1
CAPSULE, DELAYED RELEASE ORAL
Qty: 90 CAPSULE | Refills: 1 | Status: SHIPPED | OUTPATIENT
Start: 2022-03-11 | End: 2022-10-14 | Stop reason: SDUPTHER

## 2022-03-11 SDOH — ECONOMIC STABILITY: FOOD INSECURITY: WITHIN THE PAST 12 MONTHS, THE FOOD YOU BOUGHT JUST DIDN'T LAST AND YOU DIDN'T HAVE MONEY TO GET MORE.: NEVER TRUE

## 2022-03-11 SDOH — ECONOMIC STABILITY: FOOD INSECURITY: WITHIN THE PAST 12 MONTHS, YOU WORRIED THAT YOUR FOOD WOULD RUN OUT BEFORE YOU GOT MONEY TO BUY MORE.: NEVER TRUE

## 2022-03-11 ASSESSMENT — ENCOUNTER SYMPTOMS
SHORTNESS OF BREATH: 0
DIARRHEA: 0
EYE PAIN: 0
BLOOD IN STOOL: 0
CHEST TIGHTNESS: 0
WHEEZING: 0
ABDOMINAL DISTENTION: 1
NAUSEA: 0
VOMITING: 0
TROUBLE SWALLOWING: 0
ABDOMINAL PAIN: 0

## 2022-03-11 ASSESSMENT — SOCIAL DETERMINANTS OF HEALTH (SDOH): HOW HARD IS IT FOR YOU TO PAY FOR THE VERY BASICS LIKE FOOD, HOUSING, MEDICAL CARE, AND HEATING?: NOT HARD AT ALL

## 2022-03-11 NOTE — PROGRESS NOTES
3/11/2022    Reji Waller    Chief Complaint   Patient presents with    Medication Check    Knee Pain     [place] on ice a few weeks ago. Still bothering him. Constant.  Other     Asking about stomach size.  Medication Check     does he need to take Synthroid? Wants Celebrex. HPI  History was obtained from the patient. Dilan Valdes is a 78 y.o. male who presents today with follow-up on cirrhosis with massive ascites portal hypertension, hypertension, anxiety hepatic encephalopathy, sleep apnea obesity history of esophageal varices. Also has recently diagnosed hypothyroidism. He has been through 2 large volume paracentesis recently labs were reviewed does have occasional elevation of ammonia levels after discussion he admits he is still drinking occasional beer and is not really taking his lactulose regularly he was concerned about lack of mental sharpness but it certainly could be related to fluctuating ammonia levels. He does follow closely with Dr. Phoenix Owens for department of GI medicine. He is somewhat hard to communicate with because of his hearing loss. REVIEW OF SYMPTOMS    Review of Systems   Constitutional: Negative for activity change and fatigue. HENT: Negative for congestion, hearing loss, mouth sores and trouble swallowing. Patient with persistent hearing loss. Eyes: Negative for pain and visual disturbance. Respiratory: Negative for chest tightness, shortness of breath and wheezing. Cardiovascular: Negative for chest pain and palpitations. Gastrointestinal: Positive for abdominal distention. Negative for abdominal pain, blood in stool, diarrhea, nausea and vomiting. Endocrine: Negative for polydipsia and polyuria. Genitourinary: Negative for dysuria, frequency and urgency. Musculoskeletal: Negative for arthralgias, gait problem and neck stiffness. Skin: Negative for rash. Allergic/Immunologic: Negative for environmental allergies.    Neurological: Negative for dizziness, seizures, speech difficulty and weakness. Patient with complaints of impaired memory and not being very sharp at times. Hematological: Does not bruise/bleed easily. Psychiatric/Behavioral: Positive for confusion. Negative for agitation and hallucinations. The patient is not nervous/anxious. PAST MEDICAL HISTORY  Past Medical History:   Diagnosis Date    Anxiety     Cirrhosis, alcoholic ([address]     GERD (gastroesophageal reflux disease)     GERD (gastroesophageal reflux disease)     Hyperlipidemia     Hypertension     Portal hypertension (HCC)     Pulmonary nodules     Sleep apnea     SOB (shortness of breath)        FAMILY HISTORY  Family History   Problem Relation Age of Onset    Hypertension Brother     Diabetes Other        SOCIAL HISTORY  Social History     Socioeconomic History    Marital status:      Spouse name: None    Number of children: None    Years of education: None    Highest education level: None   Occupational History     Comment: Retired    Tobacco Use    Smoking status: Former Smoker     Packs/day: 1.00     Years: 14.00     Pack years: 14.00     Quit date:      Years since quittin.2    Smokeless tobacco: Never Used   Vaping Use    Vaping Use: Never used   Substance and Sexual Activity    Alcohol use: Not Currently    Drug use: Never    Sexual activity: Yes     Partners: Female   Other Topics Concern    None   Social History Narrative    None     Social Determinants of Health     Financial Resource Strain: Low Risk     Difficulty of Paying Living Expenses: Not hard at all   Food Insecurity: No Food Insecurity    Worried About Running Out of Food in the Last Year: Never true    Hector of Food in the Last Year: Never true   Transportation Needs:     Lack of Transportation (Medical): Not on file    Lack of Transportation (Non-Medical):  Not on file   Physical Activity:     Days of Exercise per Week: Not on file  Minutes of Exercise per Session: Not on file   Stress:     Feeling of Stress : Not on file   Social Connections:     Frequency of Communication with Friends and Family: Not on file    Frequency of Social Gatherings with Friends and Family: Not on file    Attends Congregational Services: Not on file    Active Member of [address] [place] [address] or Organizations: Not on file    Attends Club or Organization Meetings: Not on file    Marital Status: Not on file   Intimate Partner Violence:     Fear of Current or Ex-Partner: Not on file    Emotionally Abused: Not on file    Physically Abused: Not on file    Sexually Abused: Not on file   Housing Stability:     Unable to Pay for Housing in the Last Year: Not on file    Number of [place] in the Last Year: Not on file    Unstable Housing in the Last Year: Not on file        SURGICAL HISTORY  Past Surgical History:   Procedure Laterality Date    CHOLECYSTECTOMY      COLONOSCOPY      ENDOSCOPY, COLON, DIAGNOSTIC      FOOT SURGERY      needle removed,not sure which foot, per wife.  UPPER GASTROINTESTINAL ENDOSCOPY N/A 1/19/2022    EGD BIOPSY performed by [facility]ma [facility] MD at [facility]  Current Outpatient Medications   Medication Sig Dispense Refill    DULoxetine (CYMBALTA) 60 MG extended release capsule TAKE 1 CAPSULE BY MOUTH ONCE DAILY 90 capsule 1    spironolactone (ALDACTONE) 50 MG tablet Take 2 tablets by mouth daily 90 tablet 0    ALPRAZolam (XANAX) 0.25 MG tablet Take 0.25 mg by mouth nightly as needed for Sleep.       levothyroxine (SYNTHROID) 25 MCG tablet Take 1 tablet by mouth Daily 90 tablet 1    lactulose (CHRONULAC) 10 GM/15ML solution Take 30 mLs by mouth 3 times daily 2700 mL 5    ferrous sulfate (IRON 325) 325 (65 Fe) MG tablet Take 1 tablet by mouth      zinc gluconate 50 MG tablet Take 50 mg by mouth 2 times daily      b complex vitamins capsule Take 1 capsule by mouth daily      furosemide (LASIX) 40 MG tablet TAKE 1 tab am and pm (Patient taking differently: Take 40 mg by mouth 2 times daily TAKE 1 tab am and pm) 180 tablet 1    tadalafil (CIALIS) 20 MG tablet Take 20 mg by mouth every 3 days       vitamin E 400 UNIT capsule Take 800 Units by mouth daily      magnesium oxide (MAG-OX) 400 MG tablet Take 200 mg by mouth 2 times daily OTC 1 qd  1    Multiple Vitamin (MULTI VITAMIN MENS PO) Take 1 tablet by mouth daily      ESOMEPRAZOLE MAGNESIUM PO Take 20 mg by mouth daily OTC      Omega-3 1000 MG CAPS Take 1 capsule by mouth daily      nadolol (CORGARD) 40 MG tablet Take 1 tablet by mouth daily 90 tablet 1    albuterol sulfate HFA (VENTOLIN HFA) 108 (90 Base) MCG/ACT inhaler Inhale 2 puffs into the lungs 4 times daily as needed for Wheezing (Patient not taking: Reported on 3/11/2022) 1 Inhaler 0     No current facility-administered medications for this visit. ALLERGIES  Allergies   Allergen Reactions    Lisinopril Swelling     Tongue swelling      Zetia [Ezetimibe] Swelling     Facial swelling    Atorvastatin     Codeine Other (See Comments)     Blood pressure drops    Hydrochlorothiazide     Hydroxyzine      Fatigue and depressed    Lexapro [Escitalopram Oxalate]      Increased depression    Pravastatin        PHYSICAL EXAM    /64 (Site: Right Upper Arm, Position: Sitting, Cuff Size: Medium Adult)   Pulse 64   Ht 5' 8\" (1.727 m)   Wt 289 lb 9.6 oz (131.4 kg)   SpO2 96%   BMI 44.03 kg/m²     Physical Exam  Vitals and nursing note reviewed. Constitutional:       General: He is not in acute distress. Appearance: He is well-developed. He is obese. He is not ill-appearing, toxic-appearing or diaphoretic. HENT:      Head: Normocephalic and atraumatic. Nose: Nose normal. No congestion. Mouth/Throat:      Mouth: Mucous membranes are moist.   Eyes:      Pupils: Pupils are equal, round, and reactive to light.    Cardiovascular:      Rate and Rhythm: Normal rate and regular rhythm. Heart sounds: Normal heart sounds. No murmur heard. Pulmonary:      Effort: Pulmonary effort is normal. No respiratory distress. Breath sounds: Normal breath sounds. No wheezing, rhonchi or rales. Abdominal:      General: There is distension. Palpations: Abdomen is soft. Comments: Patient with massive abdominal distention and probable fluid wave no current tenderness or mass. Patient has history of alcoholic cirrhosis with secondary portal hypertension and massive ascites. Musculoskeletal:         General: Swelling present. No deformity. Normal range of motion. Cervical back: Normal range of motion and neck supple. No rigidity. Skin:     General: Skin is warm and dry. Coloration: Skin is not jaundiced. Neurological:      General: No focal deficit present. Mental Status: He is alert and oriented to person, place, and time. Mental status is at baseline. Cranial Nerves: No cranial nerve deficit. Psychiatric:         Mood and Affect: Mood normal.         Behavior: Behavior normal.         Thought Content: Thought content normal.         ASSESSMENT & PLAN     Diagnosis Orders   1. Primary hypertension     2. History of alcohol abuse     3. Acquired hypothyroidism  TSH with Reflex    T4, Free   4. Alcoholic cirrhosis of liver with ascites ([address]     5. Increased ammonia level     6. Anxiety     7. Hearing loss, unspecified hearing loss type, unspecified laterality     Patient advised to follow-up with Dr Lesly Gupta) and get labs as he directs -we will add in a free T4 and a TSH. He is to take his lactulose and other meds as directed. Med list was clarified and updated. He will stay on thyroid replacement for the time being. Questions answered and encouragement provided. Return in about 4 months (around 7/11/2022).          Electronically signed by Lexy Jacobsen MD on 3/11/2022

## 2022-03-19 ENCOUNTER — HOSPITAL ENCOUNTER (INPATIENT)
Age: 77
LOS: 2 days | Discharge: HOME | DRG: 433 | End: 2022-03-21
Attending: INTERNAL MEDICINE | Admitting: INTERNAL MEDICINE
Payer: MEDICARE

## 2022-03-19 ENCOUNTER — TELEPHONE (OUTPATIENT)
Dept: GASTROENTEROLOGY | Age: 77
End: 2022-03-19

## 2022-03-19 DIAGNOSIS — K70.31 ALCOHOLIC CIRRHOSIS OF LIVER WITH ASCITES (HCC): Primary | ICD-10-CM

## 2022-03-19 DIAGNOSIS — K70.31 ALCOHOLIC CIRRHOSIS OF LIVER WITH ASCITES (HCC): ICD-10-CM

## 2022-03-19 PROBLEM — A41.9 SEPSIS (HCC): Status: ACTIVE | Noted: 2022-03-19

## 2022-03-19 PROBLEM — K76.82 HEPATIC ENCEPHALOPATHY (HCC): Status: ACTIVE | Noted: 2022-03-19

## 2022-03-19 PROCEDURE — 1200000000 HC SEMI PRIVATE

## 2022-03-19 PROCEDURE — 6370000000 HC RX 637 (ALT 250 FOR IP): Performed by: INTERNAL MEDICINE

## 2022-03-19 PROCEDURE — 2580000003 HC RX 258: Performed by: INTERNAL MEDICINE

## 2022-03-19 PROCEDURE — 99223 1ST HOSP IP/OBS HIGH 75: CPT | Performed by: INTERNAL MEDICINE

## 2022-03-19 RX ORDER — FUROSEMIDE 10 MG/ML
40 INJECTION INTRAMUSCULAR; INTRAVENOUS 2 TIMES DAILY
Status: DISCONTINUED | OUTPATIENT
Start: 2022-03-20 | End: 2022-03-21 | Stop reason: HOSPADM

## 2022-03-19 RX ORDER — VITAMIN E 268 MG
800 CAPSULE ORAL DAILY
Status: DISCONTINUED | OUTPATIENT
Start: 2022-03-20 | End: 2022-03-21 | Stop reason: HOSPADM

## 2022-03-19 RX ORDER — MAGNESIUM OXIDE 400 MG/1
200 TABLET ORAL 2 TIMES DAILY
Status: DISCONTINUED | OUTPATIENT
Start: 2022-03-19 | End: 2022-03-21 | Stop reason: HOSPADM

## 2022-03-19 RX ORDER — SPIRONOLACTONE 50 MG/1
100 TABLET, FILM COATED ORAL DAILY
Status: DISCONTINUED | OUTPATIENT
Start: 2022-03-20 | End: 2022-03-21 | Stop reason: HOSPADM

## 2022-03-19 RX ORDER — ACETAMINOPHEN 650 MG/1
650 SUPPOSITORY RECTAL EVERY 6 HOURS PRN
Status: DISCONTINUED | OUTPATIENT
Start: 2022-03-19 | End: 2022-03-21 | Stop reason: HOSPADM

## 2022-03-19 RX ORDER — ESOMEPRAZOLE MAGNESIUM 20 MG/1
20 TABLET, DELAYED RELEASE ORAL DAILY
Status: DISCONTINUED | OUTPATIENT
Start: 2022-03-20 | End: 2022-03-19 | Stop reason: CLARIF

## 2022-03-19 RX ORDER — POLYETHYLENE GLYCOL 3350 17 G/17G
17 POWDER, FOR SOLUTION ORAL DAILY PRN
Status: DISCONTINUED | OUTPATIENT
Start: 2022-03-19 | End: 2022-03-21 | Stop reason: HOSPADM

## 2022-03-19 RX ORDER — SODIUM CHLORIDE 0.9 % (FLUSH) 0.9 %
10 SYRINGE (ML) INJECTION EVERY 12 HOURS SCHEDULED
Status: DISCONTINUED | OUTPATIENT
Start: 2022-03-19 | End: 2022-03-21 | Stop reason: HOSPADM

## 2022-03-19 RX ORDER — LACTULOSE 10 G/15ML
20 SOLUTION ORAL 3 TIMES DAILY
Status: DISCONTINUED | OUTPATIENT
Start: 2022-03-19 | End: 2022-03-21 | Stop reason: HOSPADM

## 2022-03-19 RX ORDER — DULOXETIN HYDROCHLORIDE 30 MG/1
60 CAPSULE, DELAYED RELEASE ORAL DAILY
Status: DISCONTINUED | OUTPATIENT
Start: 2022-03-20 | End: 2022-03-21 | Stop reason: HOSPADM

## 2022-03-19 RX ORDER — PANTOPRAZOLE SODIUM 40 MG/1
40 TABLET, DELAYED RELEASE ORAL
Status: DISCONTINUED | OUTPATIENT
Start: 2022-03-20 | End: 2022-03-21 | Stop reason: HOSPADM

## 2022-03-19 RX ORDER — SODIUM CHLORIDE 0.9 % (FLUSH) 0.9 %
10 SYRINGE (ML) INJECTION PRN
Status: DISCONTINUED | OUTPATIENT
Start: 2022-03-19 | End: 2022-03-21 | Stop reason: HOSPADM

## 2022-03-19 RX ORDER — FERROUS SULFATE 325(65) MG
325 TABLET ORAL 2 TIMES DAILY WITH MEALS
Status: DISCONTINUED | OUTPATIENT
Start: 2022-03-20 | End: 2022-03-21 | Stop reason: HOSPADM

## 2022-03-19 RX ORDER — ACETAMINOPHEN 325 MG/1
650 TABLET ORAL EVERY 6 HOURS PRN
Status: DISCONTINUED | OUTPATIENT
Start: 2022-03-19 | End: 2022-03-21 | Stop reason: HOSPADM

## 2022-03-19 RX ORDER — SODIUM CHLORIDE 9 MG/ML
25 INJECTION, SOLUTION INTRAVENOUS PRN
Status: DISCONTINUED | OUTPATIENT
Start: 2022-03-19 | End: 2022-03-21 | Stop reason: HOSPADM

## 2022-03-19 RX ORDER — ZINC GLUCONATE 50 MG
50 TABLET ORAL 2 TIMES DAILY
Status: DISCONTINUED | OUTPATIENT
Start: 2022-03-19 | End: 2022-03-19

## 2022-03-19 RX ORDER — LEVOTHYROXINE SODIUM 0.03 MG/1
25 TABLET ORAL DAILY
Status: DISCONTINUED | OUTPATIENT
Start: 2022-03-20 | End: 2022-03-21 | Stop reason: HOSPADM

## 2022-03-19 RX ORDER — ZINC SULFATE 50(220)MG
50 CAPSULE ORAL
Status: DISCONTINUED | OUTPATIENT
Start: 2022-03-20 | End: 2022-03-21 | Stop reason: HOSPADM

## 2022-03-19 RX ADMIN — SODIUM CHLORIDE, PRESERVATIVE FREE 10 ML: 5 INJECTION INTRAVENOUS at 23:36

## 2022-03-19 RX ADMIN — LACTULOSE 20 G: 10 SOLUTION ORAL at 23:55

## 2022-03-19 RX ADMIN — MAGNESIUM OXIDE 400 MG (241.3 MG MAGNESIUM) TABLET 200 MG: TABLET at 23:55

## 2022-03-19 ASSESSMENT — PAIN SCALES - GENERAL
PAINLEVEL_OUTOF10: 0

## 2022-03-20 LAB
ALBUMIN SERPL-MCNC: 3.2 GM/DL (ref 3.4–5)
ALP BLD-CCNC: 110 IU/L (ref 40–128)
ALT SERPL-CCNC: 18 U/L (ref 10–40)
AMMONIA: 70 UMOL/L (ref 16–60)
ANION GAP SERPL CALCULATED.3IONS-SCNC: 8 MMOL/L (ref 4–16)
AST SERPL-CCNC: 38 IU/L (ref 15–37)
BASOPHILS ABSOLUTE: 0.1 K/CU MM
BASOPHILS RELATIVE PERCENT: 0.4 % (ref 0–1)
BILIRUB SERPL-MCNC: 0.9 MG/DL (ref 0–1)
BUN BLDV-MCNC: 24 MG/DL (ref 6–23)
CALCIUM SERPL-MCNC: 9.2 MG/DL (ref 8.3–10.6)
CHLORIDE BLD-SCNC: 100 MMOL/L (ref 99–110)
CO2: 24 MMOL/L (ref 21–32)
CREAT SERPL-MCNC: 1.3 MG/DL (ref 0.9–1.3)
DIFFERENTIAL TYPE: ABNORMAL
EOSINOPHILS ABSOLUTE: 0.3 K/CU MM
EOSINOPHILS RELATIVE PERCENT: 2.2 % (ref 0–3)
GFR AFRICAN AMERICAN: >60 ML/MIN/1.73M2
GFR NON-AFRICAN AMERICAN: 54 ML/MIN/1.73M2
GLUCOSE BLD-MCNC: 115 MG/DL (ref 70–99)
HCT VFR BLD CALC: 33 % (ref 42–52)
HEMOGLOBIN: 11 GM/DL (ref 13.5–18)
IMMATURE NEUTROPHIL %: 0.4 % (ref 0–0.43)
INR BLD: 1.16 INDEX
LYMPHOCYTES ABSOLUTE: 0.5 K/CU MM
LYMPHOCYTES RELATIVE PERCENT: 3.9 % (ref 24–44)
MCH RBC QN AUTO: 32.6 PG (ref 27–31)
MCHC RBC AUTO-ENTMCNC: 33.3 % (ref 32–36)
MCV RBC AUTO: 97.9 FL (ref 78–100)
MONOCYTES ABSOLUTE: 1.3 K/CU MM
MONOCYTES RELATIVE PERCENT: 9.2 % (ref 0–4)
NUCLEATED RBC %: 0 %
PDW BLD-RTO: 14.5 % (ref 11.7–14.9)
PLATELET # BLD: 129 K/CU MM (ref 140–440)
PMV BLD AUTO: 10.7 FL (ref 7.5–11.1)
POTASSIUM SERPL-SCNC: 3.8 MMOL/L (ref 3.5–5.1)
PROTHROMBIN TIME: 15 SECONDS (ref 11.7–14.5)
RBC # BLD: 3.37 M/CU MM (ref 4.6–6.2)
SEGMENTED NEUTROPHILS ABSOLUTE COUNT: 11.5 K/CU MM
SEGMENTED NEUTROPHILS RELATIVE PERCENT: 83.9 % (ref 36–66)
SODIUM BLD-SCNC: 132 MMOL/L (ref 135–145)
TOTAL IMMATURE NEUTOROPHIL: 0.06 K/CU MM
TOTAL NUCLEATED RBC: 0 K/CU MM
TOTAL PROTEIN: 7 GM/DL (ref 6.4–8.2)
WBC # BLD: 13.7 K/CU MM (ref 4–10.5)

## 2022-03-20 PROCEDURE — 85610 PROTHROMBIN TIME: CPT

## 2022-03-20 PROCEDURE — 99233 SBSQ HOSP IP/OBS HIGH 50: CPT | Performed by: INTERNAL MEDICINE

## 2022-03-20 PROCEDURE — 6370000000 HC RX 637 (ALT 250 FOR IP): Performed by: INTERNAL MEDICINE

## 2022-03-20 PROCEDURE — 85025 COMPLETE CBC W/AUTO DIFF WBC: CPT

## 2022-03-20 PROCEDURE — 80053 COMPREHEN METABOLIC PANEL: CPT

## 2022-03-20 PROCEDURE — 2700000000 HC OXYGEN THERAPY PER DAY

## 2022-03-20 PROCEDURE — 1200000000 HC SEMI PRIVATE

## 2022-03-20 PROCEDURE — 36415 COLL VENOUS BLD VENIPUNCTURE: CPT

## 2022-03-20 PROCEDURE — 94761 N-INVAS EAR/PLS OXIMETRY MLT: CPT

## 2022-03-20 PROCEDURE — 82140 ASSAY OF AMMONIA: CPT

## 2022-03-20 PROCEDURE — 2580000003 HC RX 258: Performed by: INTERNAL MEDICINE

## 2022-03-20 PROCEDURE — 6360000002 HC RX W HCPCS: Performed by: INTERNAL MEDICINE

## 2022-03-20 RX ADMIN — MAGNESIUM OXIDE 400 MG (241.3 MG MAGNESIUM) TABLET 200 MG: TABLET at 19:46

## 2022-03-20 RX ADMIN — MAGNESIUM OXIDE 400 MG (241.3 MG MAGNESIUM) TABLET 200 MG: TABLET at 08:25

## 2022-03-20 RX ADMIN — DULOXETINE HYDROCHLORIDE 60 MG: 30 CAPSULE, DELAYED RELEASE ORAL at 08:24

## 2022-03-20 RX ADMIN — SPIRONOLACTONE 100 MG: 50 TABLET ORAL at 13:22

## 2022-03-20 RX ADMIN — FERROUS SULFATE TAB 325 MG (65 MG ELEMENTAL FE) 325 MG: 325 (65 FE) TAB at 08:25

## 2022-03-20 RX ADMIN — ENOXAPARIN SODIUM 40 MG: 100 INJECTION SUBCUTANEOUS at 13:19

## 2022-03-20 RX ADMIN — LACTULOSE 20 G: 10 SOLUTION ORAL at 13:19

## 2022-03-20 RX ADMIN — ZINC SULFATE 220 MG (50 MG) CAPSULE 50 MG: CAPSULE at 08:36

## 2022-03-20 RX ADMIN — FERROUS SULFATE TAB 325 MG (65 MG ELEMENTAL FE) 325 MG: 325 (65 FE) TAB at 18:12

## 2022-03-20 RX ADMIN — VITAMIN E CAP 400 UNIT 800 UNITS: 400 CAP at 13:19

## 2022-03-20 RX ADMIN — LACTULOSE 20 G: 10 SOLUTION ORAL at 08:24

## 2022-03-20 RX ADMIN — PANTOPRAZOLE SODIUM 40 MG: 40 TABLET, DELAYED RELEASE ORAL at 06:25

## 2022-03-20 RX ADMIN — SODIUM CHLORIDE, PRESERVATIVE FREE 10 ML: 5 INJECTION INTRAVENOUS at 08:26

## 2022-03-20 RX ADMIN — LACTULOSE 20 G: 10 SOLUTION ORAL at 19:47

## 2022-03-20 RX ADMIN — LEVOTHYROXINE SODIUM 25 MCG: 25 TABLET ORAL at 06:25

## 2022-03-20 ASSESSMENT — PAIN SCALES - GENERAL
PAINLEVEL_OUTOF10: 0
PAINLEVEL_OUTOF10: 0

## 2022-03-20 NOTE — PROGRESS NOTES
Nurse unable to review home med list with patient at time of admission, stated list was in wallet with wife at this time.

## 2022-03-20 NOTE — PROGRESS NOTES
4 Eyes Skin Assessment     NAME:  Holly Cortez OF BIRTH:  1945  MEDICAL RECORD NUMBER:  [id number]    The patient is being assess for  Admission    I agree that 2 RN's have performed a thorough Head to Toe Skin Assessment on the patient. ALL assessment sites listed below have been assessed. Areas assessed by both nurses:    Head, Face, Ears, Shoulders, Back, Chest, Arms, Elbows, Hands, Sacrum. Buttock, Coccyx, Ischium and Legs. Feet and Heels        Does the Patient have a Wound?  No noted wound(s)       Andre Prevention initiated:  Yes   Wound Care Orders initiated:  NA    Pressure Injury (Stage 3,4, Unstageable, DTI, NWPT, and Complex wounds) if present place consult order under [de-identified] No    New and Established Ostomies if present place consult order under : NA      Nurse 1 eSignature: Electronically signed by Lidya Raza LPN on 9/51/17 at 17:38 AM EDT    **SHARE this note so that the co-signing nurse is able to place an eSignature**    Nurse 2 eSignature: Electronically signed by Noe Laughlin RN on 3/20/22 at 12:03 AM EDT

## 2022-03-20 NOTE — H&P
[facility] History and Physical      CHIEF COMPLAINT:  encephalopathy    History of Present Illness: 82-year-old male with history of alcoholic cirrhosis with portal hypertension, sleep apnea on CPAP. He was taken to the ED by his wife due to confusion. Describes it as being groggy when he wakes up. Associated with some mild confusion. When seen he was oriented x3 and answering questions appropriately. Reported no significant confusion was seen. Does describe poor memory and occasionally having to stop midsentence to remember what the rest of the sentences. He follows with Dr. Jayson Mckeon. Had a paracentesis a few weeks ago per patient since then he has accumulated. He also has swelling in his lower extremities. More so on the left. States this is usually where it accumulates. Has been taking his medications regularly. No significant change in his diet. There was concern for possible cellulitis in the ED. An ultrasound was obtained that showed no evidence of DVT. Given antibiotics and referred for admission for possible cellulitis. It appears the patient has poor understanding of his underlying cirrhosis. Wife not available when seen. No fever chills nausea vomiting. No abdominal pain no burning micturition. No pain in his legs      REVIEW OF SYSTEMS:  A comprehensive 14 point review of systems was negative except for: what is in the HPI    PMH:  Past Medical History:   Diagnosis Date    Anxiety     Cirrhosis, alcoholic ([address]     GERD (gastroesophageal reflux disease) 2000    GERD (gastroesophageal reflux disease)     Hyperlipidemia     Hypertension     Portal hypertension ([address]     Pulmonary nodules     Sleep apnea     SOB (shortness of breath)        Surgical History:  Past Surgical History:   Procedure Laterality Date    CHOLECYSTECTOMY      COLONOSCOPY      ENDOSCOPY, COLON, DIAGNOSTIC      FOOT SURGERY      needle removed,not sure which foot, per wife.     UPPER GASTROINTESTINAL ENDOSCOPY N/A 1/19/2022    EGD BIOPSY performed by Vernell Mcclure MD at [address] [place] [address]         Medications Prior to Admission:    Prior to Admission medications    Medication Sig Start Date End Date Taking? Authorizing Provider   DULoxetine (CYMBALTA) 60 MG extended release capsule TAKE 1 CAPSULE BY MOUTH ONCE DAILY 3/11/22 1/13/23  Juan Caceres MD   spironolactone (ALDACTONE) 50 MG tablet Take 2 tablets by mouth daily 2/18/22 8/17/22  Juan Caceres MD   ALPRAZolam [employer]) 0.25 MG tablet Take 0.25 mg by mouth nightly as needed for Sleep.     Historical Provider, MD   levothyroxine (SYNTHROID) 25 MCG tablet Take 1 tablet by mouth Daily 11/18/21 3/11/22  Juan Caceres MD   nadolol (CORGARD) 40 MG tablet Take 1 tablet by mouth daily 11/18/21 2/14/22  Juan Caceres MD   lactulose [facility]) 10 GM/15ML solution Take 30 mLs by mouth 3 times daily 10/13/21   Vernell Mcclure MD   ferrous sulfate (IRON 325) 325 (65 Fe) MG tablet Take 1 tablet by mouth    Historical Provider, MD   zinc gluconate 50 MG tablet Take 50 mg by mouth 2 times daily    Historical Provider, MD   b complex vitamins capsule Take 1 capsule by mouth daily    Historical Provider, MD   furosemide (LASIX) 40 MG tablet TAKE 1 tab am and pm  Patient taking differently: Take 40 mg by mouth 2 times daily TAKE 1 tab am and pm 8/30/21 4/15/22  Juan Caceres MD   albuterol sulfate HFA (VENTOLIN HFA) 108 (90 Base) MCG/ACT inhaler Inhale 2 puffs into the lungs 4 times daily as needed for Wheezing  Patient not taking: Reported on 3/11/2022 8/30/21   Juan Caceres MD   tadalafil (CIALIS) 20 MG tablet Take 20 mg by mouth every 3 days     Historical Provider, MD   vitamin E 400 UNIT capsule Take 800 Units by mouth daily    Historical Provider, MD   magnesium oxide (MAG-OX) 400 MG tablet Take 200 mg by mouth 2 times daily OTC 1 qd 10/15/19   Historical Provider, MD   Multiple Vitamin (MULTI VITAMIN MENS PO) Take 1 tablet by mouth daily    Historical Provider, MD   ESOMEPRAZOLE MAGNESIUM PO Take 20 mg by mouth daily OTC    Historical Provider, MD   [place]-3 1000 MG CAPS Take 1 capsule by mouth daily    Historical Provider, MD       Allergies:    Lisinopril, Zetia [ezetimibe], Atorvastatin, Codeine, Hydrochlorothiazide, Hydroxyzine, Lexapro [escitalopram oxalate], and Pravastatin    Social History:    reports that he quit smoking about 46 years ago. He has a 14.00 pack-year smoking history. He has never used smokeless tobacco. He reports previous alcohol use. He reports that he does not use drugs. Family History:   family history includes Diabetes in an other family member; Hypertension in his brother. PHYSICAL EXAM:  Vitals:  /64   Pulse 65   Temp 98.6 °F (37 °C) (Oral)   Resp 18   Ht 5' 8\" (1.727 m)   Wt 288 lb (130.6 kg)   SpO2 96%   BMI 43.79 kg/m²   General Appearance: alert and oriented to person, place and time and in no acute distress  Skin: warm and dry, turgor not diminished  Head: normocephalic and atraumatic  Eyes: pupils equal, round, and reactive to light, extraocular eye movements intact, conjunctivae normal  Neck: neck supple and non tender without mass   Pulmonary/Chest: clear to auscultation bilaterally- no wheezes, rales or rhonchi, normal air movement, no respiratory distress  Cardiovascular: normal rate, normal S1 and S2 and no M/R/R  Abdomen: soft, distended. Somewhat tense. No erythema  Extremities: no cyanosis, no clubbing and no edema  Neurologic: no cranial nerve deficit and speech normal.  Mild asterixis but no overt        LABS:  Reviewed from outside hospital.  Creatinine 1.4. LFTs were not obtained    Radiology:   No orders to display     Doppler showed no significant normality      ASSESSMENT/PLAN:  Alcoholic cirrhosis-denies recent drinking  ? Hepatic encephalopathy  Ascites  -Check LFTs. INR  -Check ammonia. Continue lactulose.   No significant encephalopathy when seen and no significant asterixis. Await ammonia level and will not escalate laxatives at this time  -Paracentesis on Monday. In the meantime Lasix IV and continue Aldactone. Salt restriction. Lower extremity edema  -Likely due to above. Monitor with IV diuretics  -Left leg more swollen than right likely due to venous insufficiency. Doppler showed no DVT    CKD stage III: Creatinine 1.4. Monitor with diuresis    Code Status: Full  DVT prophylaxis: Lovenox      NOTE: This report was transcribed using voice recognition software. Every effort was made to ensure accuracy; however, inadvertent computerized transcription errors may be present.   Electronically signed by Navi Mckeon MD on 3/19/2022 at 11:21 PM

## 2022-03-20 NOTE — PROGRESS NOTES
Patient arrived to unit via stretcher. Orientated to room and call light. Educated to ask for help when getting up, verbalized understanding.

## 2022-03-20 NOTE — PROGRESS NOTES
[facility] Progress Note    Admitting Date and Time: 3/19/2022  9:18 PM  Admit Dx: Sepsis ([facility] [A41.9]  Hepatic encephalopathy ([facility] [K72.90]    Subjective:  Patient is being followed for Sepsis ([facility] [A41.9]  Hepatic encephalopathy ([facility] [K72.90]   Pt feels no change compared to last night. He was awakened at 10 :30 am and was still drowsy. ROS: denies fever, chills, cp, sob, n/v, HA unless stated above.       sodium chloride flush  10 mL IntraVENous 2 times per day    enoxaparin  40 mg SubCUTAneous Daily    DULoxetine  60 mg Oral Daily    ferrous sulfate  325 mg Oral BID WC    furosemide  40 mg IntraVENous BID    lactulose  20 g Oral TID    magnesium oxide  200 mg Oral BID    spironolactone  100 mg Oral Daily    vitamin E  800 Units Oral Daily    levothyroxine  25 mcg Oral Daily    pantoprazole  40 mg Oral QAM AC    zinc sulfate  50 mg Oral Q72H     sodium chloride flush, 10 mL, PRN  sodium chloride, 25 mL, PRN  polyethylene glycol, 17 g, Daily PRN  acetaminophen, 650 mg, Q6H PRN   Or  acetaminophen, 650 mg, Q6H PRN         Objective:    BP (!) 107/50   Pulse 73   Temp 98.8 °F (37.1 °C) (Oral)   Resp 18   Ht 5' 8\" (1.727 m)   Wt 288 lb (130.6 kg)   SpO2 97%   BMI 43.79 kg/m²   General Appearance: alert and oriented to person, place and time and in no acute distress  Skin: warm and dry  Head: normocephalic and atraumatic  Eyes: pupils equal, round, and reactive to light, extraocular eye movements intact, conjunctivae normal  Neck: neck supple and non tender without mass   Pulmonary/Chest: clear to auscultation bilaterally- no wheezes, rales or rhonchi, normal air movement, no respiratory distress  Cardiovascular: normal rate, normal S1 and S2 and no carotid bruits  Abdomen: soft, non-tender, non-distended, normal bowel sounds, no masses or organomegaly  Extremities: no cyanosis, no clubbing and no edema  Neurologic: no cranial nerve deficit and speech normal        Recent Labs     03/20/22  0655   *   K 3.8      CO2 24   BUN 24*   CREATININE 1.3   GLUCOSE 115*   CALCIUM 9.2       Recent Labs     03/20/22  0655   WBC 13.7*   RBC 3.37*   HGB 11.0*   HCT 33.0*   MCV 97.9   MCH 32.6*   MCHC 33.3   RDW 14.5   *   MPV 10.7       ASSESSMENT/PLAN:  Alcoholic cirrhosis-denies recent drinking  Hepatic encephalopathy  Ascites  -ammonia 70, continue lactulose for hepatic encephalopathy. Consult GI, defer rifaximin to them. -Paracentesis on Monday. In the meantime Lasix IV and continue Aldactone. Salt restriction. -INR 1.16    Lower extremity edema  -Likely due to above. Monitor with IV diuretics  -Left leg more swollen than right likely due to venous insufficiency. Doppler showed no DVT     CKD stage III: Creatinine 1.4. Monitor with diuresis     Code Status: Full  DVT prophylaxis: Lovenox         NOTE: This report was transcribed using voice recognition software. Every effort was made to ensure accuracy; however, inadvertent computerized transcription errors may be present.   Electronically signed by Clay Sampson MD on 3/20/2022 at 11:24 AM

## 2022-03-21 ENCOUNTER — APPOINTMENT (OUTPATIENT)
Dept: ULTRASOUND IMAGING | Age: 77
DRG: 433 | End: 2022-03-21
Attending: INTERNAL MEDICINE
Payer: MEDICARE

## 2022-03-21 ENCOUNTER — APPOINTMENT (OUTPATIENT)
Dept: INTERVENTIONAL RADIOLOGY/VASCULAR | Age: 77
DRG: 433 | End: 2022-03-21
Attending: INTERNAL MEDICINE
Payer: MEDICARE

## 2022-03-21 VITALS
SYSTOLIC BLOOD PRESSURE: 128 MMHG | OXYGEN SATURATION: 97 % | HEIGHT: 68 IN | TEMPERATURE: 98.3 F | HEART RATE: 75 BPM | RESPIRATION RATE: 20 BRPM | BODY MASS INDEX: 45.27 KG/M2 | DIASTOLIC BLOOD PRESSURE: 55 MMHG | WEIGHT: 298.72 LBS

## 2022-03-21 LAB
ALBUMIN SERPL-MCNC: 3.6 GM/DL (ref 3.4–5)
ALP BLD-CCNC: 93 IU/L (ref 40–129)
ALT SERPL-CCNC: 16 U/L (ref 10–40)
AMMONIA: 44 UMOL/L (ref 16–60)
ANION GAP SERPL CALCULATED.3IONS-SCNC: 12 MMOL/L (ref 4–16)
AST SERPL-CCNC: 33 IU/L (ref 15–37)
BASOPHILS ABSOLUTE: 0 K/CU MM
BASOPHILS RELATIVE PERCENT: 0.5 % (ref 0–1)
BILIRUB SERPL-MCNC: 0.8 MG/DL (ref 0–1)
BUN BLDV-MCNC: 20 MG/DL (ref 6–23)
CALCIUM SERPL-MCNC: 9.2 MG/DL (ref 8.3–10.6)
CHLORIDE BLD-SCNC: 100 MMOL/L (ref 99–110)
CO2: 26 MMOL/L (ref 21–32)
CREAT SERPL-MCNC: 1.1 MG/DL (ref 0.9–1.3)
DIFFERENTIAL TYPE: ABNORMAL
EOSINOPHILS ABSOLUTE: 0.4 K/CU MM
EOSINOPHILS RELATIVE PERCENT: 4.6 % (ref 0–3)
FLUID TYPE: NORMAL INDEX
GFR AFRICAN AMERICAN: >60 ML/MIN/1.73M2
GFR NON-AFRICAN AMERICAN: >60 ML/MIN/1.73M2
GLUCOSE BLD-MCNC: 95 MG/DL (ref 70–99)
HCT VFR BLD CALC: 32 % (ref 42–52)
HEMOGLOBIN: 10.6 GM/DL (ref 13.5–18)
IMMATURE NEUTROPHIL %: 0.3 % (ref 0–0.43)
LYMPHOCYTES ABSOLUTE: 0.7 K/CU MM
LYMPHOCYTES RELATIVE PERCENT: 9.5 % (ref 24–44)
LYMPHOCYTES, BODY FLUID: 75 %
MCH RBC QN AUTO: 32.5 PG (ref 27–31)
MCHC RBC AUTO-ENTMCNC: 33.1 % (ref 32–36)
MCV RBC AUTO: 98.2 FL (ref 78–100)
MESOTHELIAL FLUID: 0 /100 WBC
MONOCYTE, FLUID: 7 %
MONOCYTES ABSOLUTE: 1.2 K/CU MM
MONOCYTES RELATIVE PERCENT: 15 % (ref 0–4)
NEUTROPHIL, FLUID: 18 %
NUCLEATED RBC %: 0 %
OTHER CELLS FLUID: NORMAL
PDW BLD-RTO: 14.6 % (ref 11.7–14.9)
PLATELET # BLD: 121 K/CU MM (ref 140–440)
PMV BLD AUTO: 11.2 FL (ref 7.5–11.1)
POTASSIUM SERPL-SCNC: 3.6 MMOL/L (ref 3.5–5.1)
RBC # BLD: 3.26 M/CU MM (ref 4.6–6.2)
RBC FLUID: 88 /CU MM
SEGMENTED NEUTROPHILS ABSOLUTE COUNT: 5.5 K/CU MM
SEGMENTED NEUTROPHILS RELATIVE PERCENT: 70.1 % (ref 36–66)
SODIUM BLD-SCNC: 138 MMOL/L (ref 135–145)
TOTAL IMMATURE NEUTOROPHIL: 0.02 K/CU MM
TOTAL NUCLEATED RBC: 0 K/CU MM
TOTAL PROTEIN: 6.9 GM/DL (ref 6.4–8.2)
WBC # BLD: 7.8 K/CU MM (ref 4–10.5)
WBC FLUID: 148 /CU MM

## 2022-03-21 PROCEDURE — 87205 SMEAR GRAM STAIN: CPT

## 2022-03-21 PROCEDURE — 88108 CYTOPATH CONCENTRATE TECH: CPT

## 2022-03-21 PROCEDURE — 87070 CULTURE OTHR SPECIMN AEROBIC: CPT

## 2022-03-21 PROCEDURE — P9047 ALBUMIN (HUMAN), 25%, 50ML: HCPCS | Performed by: SPECIALIST

## 2022-03-21 PROCEDURE — 49083 ABD PARACENTESIS W/IMAGING: CPT

## 2022-03-21 PROCEDURE — 2580000003 HC RX 258: Performed by: INTERNAL MEDICINE

## 2022-03-21 PROCEDURE — 85025 COMPLETE CBC W/AUTO DIFF WBC: CPT

## 2022-03-21 PROCEDURE — 94761 N-INVAS EAR/PLS OXIMETRY MLT: CPT

## 2022-03-21 PROCEDURE — 2709999900 HC NON-CHARGEABLE SUPPLY

## 2022-03-21 PROCEDURE — 76705 ECHO EXAM OF ABDOMEN: CPT

## 2022-03-21 PROCEDURE — 82105 ALPHA-FETOPROTEIN SERUM: CPT

## 2022-03-21 PROCEDURE — 6360000002 HC RX W HCPCS: Performed by: SPECIALIST

## 2022-03-21 PROCEDURE — 0W9G3ZZ DRAINAGE OF PERITONEAL CAVITY, PERCUTANEOUS APPROACH: ICD-10-PCS | Performed by: SPECIALIST

## 2022-03-21 PROCEDURE — 2700000000 HC OXYGEN THERAPY PER DAY

## 2022-03-21 PROCEDURE — 89051 BODY FLUID CELL COUNT: CPT

## 2022-03-21 PROCEDURE — C1729 CATH, DRAINAGE: HCPCS

## 2022-03-21 PROCEDURE — 80053 COMPREHEN METABOLIC PANEL: CPT

## 2022-03-21 PROCEDURE — 99222 1ST HOSP IP/OBS MODERATE 55: CPT | Performed by: SPECIALIST

## 2022-03-21 PROCEDURE — 88305 TISSUE EXAM BY PATHOLOGIST: CPT

## 2022-03-21 PROCEDURE — 82140 ASSAY OF AMMONIA: CPT

## 2022-03-21 PROCEDURE — 36415 COLL VENOUS BLD VENIPUNCTURE: CPT

## 2022-03-21 PROCEDURE — 6370000000 HC RX 637 (ALT 250 FOR IP): Performed by: INTERNAL MEDICINE

## 2022-03-21 RX ORDER — GUAIFENESIN 600 MG/1
600 TABLET, EXTENDED RELEASE ORAL 2 TIMES DAILY
Qty: 30 TABLET | Refills: 0 | Status: SHIPPED | OUTPATIENT
Start: 2022-03-21 | End: 2022-04-05

## 2022-03-21 RX ORDER — SPIRONOLACTONE 100 MG/1
100 TABLET, FILM COATED ORAL DAILY
Qty: 90 TABLET | Refills: 3 | Status: ON HOLD | OUTPATIENT
Start: 2022-03-21 | End: 2022-05-27 | Stop reason: HOSPADM

## 2022-03-21 RX ORDER — LACTULOSE 10 G/15ML
20 SOLUTION ORAL 3 TIMES DAILY
Qty: 2700 ML | Refills: 5 | Status: SHIPPED | OUTPATIENT
Start: 2022-03-21

## 2022-03-21 RX ORDER — ALBUMIN (HUMAN) 12.5 G/50ML
75 SOLUTION INTRAVENOUS ONCE
Status: COMPLETED | OUTPATIENT
Start: 2022-03-21 | End: 2022-03-21

## 2022-03-21 RX ORDER — FUROSEMIDE 40 MG/1
40 TABLET ORAL 2 TIMES DAILY
Qty: 60 TABLET | Refills: 3 | Status: ON HOLD | OUTPATIENT
Start: 2022-03-21 | End: 2022-05-26 | Stop reason: HOSPADM

## 2022-03-21 RX ADMIN — PANTOPRAZOLE SODIUM 40 MG: 40 TABLET, DELAYED RELEASE ORAL at 05:20

## 2022-03-21 RX ADMIN — LEVOTHYROXINE SODIUM 25 MCG: 25 TABLET ORAL at 05:20

## 2022-03-21 RX ADMIN — DULOXETINE HYDROCHLORIDE 60 MG: 30 CAPSULE, DELAYED RELEASE ORAL at 14:16

## 2022-03-21 RX ADMIN — SPIRONOLACTONE 100 MG: 50 TABLET ORAL at 14:17

## 2022-03-21 RX ADMIN — LACTULOSE 20 G: 10 SOLUTION ORAL at 14:16

## 2022-03-21 RX ADMIN — ALBUMIN (HUMAN) 75 G: 0.25 INJECTION, SOLUTION INTRAVENOUS at 09:15

## 2022-03-21 RX ADMIN — SODIUM CHLORIDE, PRESERVATIVE FREE 10 ML: 5 INJECTION INTRAVENOUS at 14:18

## 2022-03-21 ASSESSMENT — PAIN SCALES - GENERAL
PAINLEVEL_OUTOF10: 0

## 2022-03-21 NOTE — DISCHARGE SUMMARY
Discharge Summary    Name:  Bella Dong /Age/Sex: 1945  (78 y.o. male)   MRN & CSN:  [id number] & [id number] Admission Date/Time: 3/19/2022  9:18 PM   Attending:  Ulices Fernandez MD Discharging Physician: Phuong Schmitz MD     Hospital Course:   Bella Dong is a 78 y.o.  male  who presents with Hepatic encephalopathy [facility])    Patient was seen and examined  Denied any chest pain or abdominal pain  No fever or chills  Mentation seems to be back to baseline  Patient demanding to be discharged    Assessment and Plan    1) Decompensated Alcoholic liver cirrhosis with ascites  -Due to non adherence  -S/P IR drainage of 57600 cc of fluid; cell count negative for SBP  -Continue lasix and aldactone   -No hepatic mass identified on US    -Follow up with GI in 2 weeks    2) Hepatic encephalopathy  -improved  -Continue with lactulose and Rifaximin  -GI to follow as OP    Other chronic medical condition; medication resumed unless contraindicated       CKD stage III    Morbid Obesity    Alcohol Use disorder     [place]    The patient expressed appropriate understanding of and agreement with the discharge recommendations, medications, and plan.      Consults this admission:  IP CONSULT TO GI  IP CONSULT TO IV TEAM  IP CONSULT TO GI  IP CONSULT TO [facility]    Discharge Instruction:   Follow up appointments: GI in 2 weeks  Primary care physician:  within 2 weeks    Diet:  regular diet   Activity: activity as tolerated  Disposition: Discharged to:   [x]Home, [][facility], []SNF, []Acute Rehab, []Hospice   Condition on discharge: Stable    Discharge Medications:        Medication List      START taking these medications    rifaximin 550 MG tablet  Commonly known as: XIFAXAN  Take 1 tablet by mouth 2 times daily        CHANGE how you take these medications    lactulose 10 GM/15ML solution  Commonly known as: CHRONULAC  Take 30 mLs by mouth 3 times daily Hold if having diarrhea  What changed: additional instructions     spironolactone 100 MG tablet  Commonly known as: ALDACTONE  Take 1 tablet by mouth daily  What changed: medication strength        CONTINUE taking these medications    albuterol sulfate  (90 Base) MCG/ACT inhaler  Commonly known as: Ventolin HFA  Inhale 2 puffs into the lungs 4 times daily as needed for Wheezing     b complex vitamins capsule     DULoxetine 60 MG extended release capsule  Commonly known as: CYMBALTA  TAKE 1 CAPSULE BY MOUTH ONCE DAILY     ESOMEPRAZOLE MAGNESIUM PO     ferrous sulfate 325 (65 Fe) MG tablet  Commonly known as: IRON 325     furosemide 40 MG tablet  Commonly known as: LASIX  Take 1 tablet by mouth 2 times daily TAKE 1 tab am and pm     levothyroxine 25 MCG tablet  Commonly known as: SYNTHROID  Take 1 tablet by mouth Daily     magnesium oxide 400 MG tablet  Commonly known as: MAG-OX     MULTI VITAMIN MENS PO     nadolol 40 MG tablet  Commonly known as: CORGARD  Take 1 tablet by mouth daily     Omega-3 1000 MG Caps     tadalafil 20 MG tablet  Commonly known as: CIALIS     vitamin E 400 UNIT capsule     zinc gluconate 50 MG tablet        STOP taking these medications    ALPRAZolam 0.25 MG tablet  Commonly known as: XANAX           Where to Get Your Medications      These medications were sent to [address] [place] [address] 26, [address]    Phone: [phone]   · furosemide 40 MG tablet  · lactulose 10 GM/15ML solution  · rifaximin 550 MG tablet  · spironolactone 100 MG tablet         Objective Findings at Discharge:   BP (!) 128/55   Pulse 75   Temp 98.3 °F (36.8 °C) (Oral)   Resp 18   Ht 5' 8\" (1.727 m)   Wt 298 lb 11.6 oz (135.5 kg)   SpO2 97%   BMI 45.42 kg/m²            PHYSICAL EXAM   GEN Awake male, sitting

## 2022-03-21 NOTE — CONSULTS
Department of Internal Medicine  Gastroenterology Consult Note  Marilyn Phelan MD      Reason for Consult:  cirrhosis    Primary Care Physician:  Gianluca Becerra MD    History Obtained From:  patient    CC: confusion    HISTORY OF PRESENT ILLNESS:              The patient is a 78 y.o.  male known to me with alcoholic cirrhosis who continues to drink. His cirrhosis is complicated by recurrent ascites despite maximal tolerated doses of Lasix and Aldactone- last outpatient creatinine was 1.4-- I called him this weekend to order follow up labs and his wife stated that he was very lethargic and confused so he was directed to the ED. his mental status improved once evaluated in the ED but he was admitted with edema and cellulitis. 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 His last AFP was 2 on 9/15/21-- repeat has been ordered but he did not get drawn. His last liver imaging for [facility]. surveillance was in September        Past Medical History:        Diagnosis Date    Anxiety     [facility]     GERD (gastroesophageal reflux disease) 2000    GERD (gastroesophageal reflux disease)     Hyperlipidemia     Hypertension     [facility]     Pulmonary nodules     Sleep apnea     SOB (shortness of breath)        Past Surgical History:        Procedure Laterality Date    CHOLECYSTECTOMY      COLONOSCOPY      ENDOSCOPY, COLON, DIAGNOSTIC      FOOT SURGERY      needle removed,not sure which foot, per wife.  UPPER GASTROINTESTINAL ENDOSCOPY N/A 1/19/2022    EGD BIOPSY performed by Jose Tao MD at [address] [place] [address]         Medications Prior to Admission:    Prior to Admission medications    Medication Sig Start Date End Date Taking?  Authorizing Provider   DULoxetine (CYMBALTA) 60 MG extended release capsule TAKE 1 CAPSULE BY MOUTH ONCE DAILY 3/11/22 1/13/23  Tharon Gosselin, MD   spironolactone (ALDACTONE) 50 MG tablet Take 2 tablets by mouth daily 2/18/22 8/17/22  Chanell Morales MD   ALPRAZolam [employer]) 0.25 MG tablet Take 0.25 mg by mouth nightly as needed for Sleep. Historical Provider, MD   levothyroxine (SYNTHROID) 25 MCG tablet Take 1 tablet by mouth Daily 11/18/21 3/11/22  Chanell Morales MD   nadolol (CORGARD) 40 MG tablet Take 1 tablet by mouth daily 11/18/21 2/14/22  Chanell Morales MD   lactulose [facility]) 10 GM/15ML solution Take 30 mLs by mouth 3 times daily 10/13/21   Skye Farias MD   ferrous sulfate (IRON 325) 325 (65 Fe) MG tablet Take 1 tablet by mouth    Historical Provider, MD   zinc gluconate 50 MG tablet Take 50 mg by mouth 2 times daily    Historical Provider, MD   b complex vitamins capsule Take 1 capsule by mouth daily    Historical Provider, MD   furosemide (LASIX) 40 MG tablet TAKE 1 tab am and pm  Patient taking differently: Take 40 mg by mouth 2 times daily TAKE 1 tab am and pm 8/30/21 4/15/22  Chanell Morales MD   albuterol sulfate HFA (VENTOLIN HFA) 108 (90 Base) MCG/ACT inhaler Inhale 2 puffs into the lungs 4 times daily as needed for Wheezing  Patient not taking: Reported on 3/11/2022 8/30/21   Chanell Morales MD   tadalafil (CIALIS) 20 MG tablet Take 20 mg by mouth every 3 days     Historical Provider, MD   vitamin E 400 UNIT capsule Take 800 Units by mouth daily    Historical Provider, MD   magnesium oxide (MAG-OX) 400 MG tablet Take 200 mg by mouth 2 times daily OTC 1 qd 10/15/19   Historical Provider, MD   Multiple Vitamin (MULTI VITAMIN MENS PO) Take 1 tablet by mouth daily    Historical Provider, MD   ESOMEPRAZOLE MAGNESIUM PO Take 20 mg by mouth daily OTC    Historical Provider, MD   [place]-3 1000 MG CAPS Take 1 capsule by mouth daily    Historical Provider, MD       Allergies:     Allergies   Allergen Reactions    Lisinopril Swelling     Tongue swelling      Zetia [Ezetimibe] Swelling     Facial swelling    Atorvastatin     Codeine Other (See Comments)     Blood pressure drops    Hydrochlorothiazide     Hydroxyzine      Fatigue and depressed    Lexapro [Escitalopram Oxalate]      Increased depression    Pravastatin    . Social History:    TOBACCO:  No  ETOH:  Yes    Family History: reviewed and positives included in HPI- all other pertinent GI family history negative      REVIEW OF SYSTEMS: see HPI for positives and pertinent negatives. All other systems reviewed and are negative    PHYSICAL EXAM:    Vitals:  BP (!) 132/52   Pulse 67   Temp 98.3 °F (36.8 °C) (Oral)   Resp 18   Ht 5' 8\" (1.727 m)   Wt 298 lb 11.6 oz (135.5 kg)   SpO2 98%   BMI 45.42 kg/m²   CONSTITUTIONAL: alert but a bit confused, cooperative, no apparent distress,   EYES:  pupils equal, round and reactive to light and sclera clear  ENT:  normocepalic, without obvious abnormality  NECK:  supple, symmetrical, trachea midline  HEMATOLOGIC/LYMPHATICS:  no cervical lymphadenopathy and no supraclavicular lymphadenopathy  LUNGS:  clear to auscultation  CARDIOVASCULAR:  regular rate and rhythm and no murmur noted  ABDOMEN:  Soft, non-tender with normal bowel sounds. abd distended with ascites. No organomegaly or masses  NEUROLOGIC: no focal deficit detected- no asterixis  SKIN:  no lesions  EXTREMITIES: no clubbing, cyanosis, 2+ edema    IMPRESSION:  1) decompensated alcoholic cirrhosis-- refractory ascites, esoph varices, chronic PSE  2) non-compliance with abstinence from alcohol- suspect non-compliant with sodium restriction as well      RECOMMENDATIONS:  1) add Xifaxan- will try to obtain as outpatient  2) AFP, surveillance US  3) agree with paracentesis          Davion Ghosh M.D

## 2022-03-21 NOTE — PROGRESS NOTES
PROCEDURE PERFORMED: Paracentesis    PRIMARY INDICATION FOR PROCEDURE: Ascites    INFORMED CONSENT:  Obtained prior to procedure. Consent placed in chart. PT TRANSPORTED FROM:    4103           TO THE IR ROOM:    Small      PT IN THE ROOM AT WHAT TIME:    0800    ASSESSMENT:  Pt verbalizes understanding of procedure. Pt arrived to IR without IV Qing Hurtado on unit has consult for IV therapy. TIME OUT COMPLETE: Name, , allergies, Procedure and Labs reviewed for time out @ [address]:               Pt remains in bed and positioned semi-griffiths for procedure. Warm blankets given. Pt placed on Vitals Signs Monitor. Pt prepped and draped in a sterile fashion with chlorhexadine. PAIN/LOCAL ANESTHESIA/SEDATION MANAGEMENT:           Local: Lidocaine 1% given by Dr Larsen Casey:  See Flowsheets for Vital Signs          ACCESS TIME: 3827          US/FLUORO:    US Images          WIRE USED:           SHEATH USED:           CATHETER USED: Safe-t- [employer] 6F Drainage Kit          FINAL IMAGE TAKEN TO CONFIRM PLACEMENT OF:           CONTRAST/CC:     Norma Marsh RN, started an IV 22 gauge IV on right wrist      STERILE DRESSINGS: 4x4 gauze with tegaderm    SPECIMENS: A total of 82740 ml yellow cloudy sediment fluid removed and 1020 ml sent to lab per orders.     EBL:  Less than 1 cc    FOLLOW- UP X-RAY: no orders    COMPLICATIONS:  None    STAFF PRESENT DURING PROCEDURE:  Dr Preet Cohen RN, Daniel Luis RN    REPORT CALLED TO: Garcia Mulligan RN on unit    PT LEFT ROOM  [facility] [address]:   [address]

## 2022-03-21 NOTE — PROGRESS NOTES
Outpatient Pharmacy Progress Note for Meds-to-Beds    Total number of Prescriptions Filled: 2  The following medications were dispensed to the patient during the discharge process:   Spironolactone 100 mg   Lactulose 10 gm/15 mL    Additional Documentation:   The patient was discharged prior to receiving medications. The patient's wife plans to pick them up at the pharmacy.  The patient's insurance would only pay for a 42 day supply of Xifaxan per year. A prior authorization is in progress.  The following prescription(s) were not covered under the patient's insurance because they can be purchased as OTC medications: Mucinex   The following prescription(s) were refilled to soon per insurance (patient has some at home): Furosemide      Thank you for letting us serve your patients.   [address] [facility] [place]    [address] [facility]    Phone: [phone]    Fax: [phone]

## 2022-03-21 NOTE — CARE COORDINATION
Reviewed chart and spoke with pt/wife, Pt lives with wife, he is totally independent and wife able to assist pt as needed. He has a PCP and insurance that's covers medications. Discussed Home but does not feel needed at this time. Plan is home with wife and no needs.

## 2022-03-22 ENCOUNTER — TELEPHONE (OUTPATIENT)
Dept: FAMILY MEDICINE CLINIC | Age: 77
End: 2022-03-22

## 2022-03-22 NOTE — TELEPHONE ENCOUNTER
Spoke with wife re scheduling tcm/hosp f/u. Pt was in the back ground and stated \"no\". Informed wife this was for f/u care post hospitalization to ensure pt is ok and needs are carlitos met.  Patient said \"no\"

## 2022-03-24 LAB
CULTURE: ABNORMAL
GRAM SMEAR: ABNORMAL
Lab: ABNORMAL
MS ALPHA-FETOPROTEIN: 2 NG/ML (ref 0–9)
SPECIMEN: ABNORMAL

## 2022-03-29 ENCOUNTER — HOSPITAL ENCOUNTER (OUTPATIENT)
Age: 77
Discharge: HOME | End: 2022-03-29
Payer: COMMERCIAL

## 2022-03-29 DIAGNOSIS — K70.31 ALCOHOLIC CIRRHOSIS OF LIVER WITH ASCITES (HCC): ICD-10-CM

## 2022-03-29 DIAGNOSIS — E03.9 ACQUIRED HYPOTHYROIDISM: ICD-10-CM

## 2022-03-29 LAB
ANION GAP SERPL CALCULATED.3IONS-SCNC: 12 MMOL/L (ref 4–16)
BUN BLDV-MCNC: 17 MG/DL (ref 6–23)
CALCIUM SERPL-MCNC: 9.3 MG/DL (ref 8.3–10.6)
CHLORIDE BLD-SCNC: 99 MMOL/L (ref 99–110)
CO2: 27 MMOL/L (ref 21–32)
CREAT SERPL-MCNC: 1.1 MG/DL (ref 0.9–1.3)
GFR AFRICAN AMERICAN: >60 ML/MIN/1.73M2
GFR NON-AFRICAN AMERICAN: >60 ML/MIN/1.73M2
GLUCOSE BLD-MCNC: 106 MG/DL (ref 70–99)
POTASSIUM SERPL-SCNC: 4.1 MMOL/L (ref 3.5–5.1)
SODIUM BLD-SCNC: 138 MMOL/L (ref 135–145)
T4 FREE: 0.78 NG/DL (ref 0.9–1.8)
TSH HIGH SENSITIVITY: 2.47 UIU/ML (ref 0.27–4.2)

## 2022-03-29 PROCEDURE — 36415 COLL VENOUS BLD VENIPUNCTURE: CPT

## 2022-03-29 PROCEDURE — 80048 BASIC METABOLIC PNL TOTAL CA: CPT

## 2022-03-29 PROCEDURE — 84443 ASSAY THYROID STIM HORMONE: CPT

## 2022-03-29 PROCEDURE — 84439 ASSAY OF FREE THYROXINE: CPT

## 2022-03-30 DIAGNOSIS — E03.9 ACQUIRED HYPOTHYROIDISM: Primary | ICD-10-CM

## 2022-03-30 RX ORDER — LEVOTHYROXINE SODIUM 0.05 MG/1
50 TABLET ORAL DAILY
Qty: 90 TABLET | Refills: 1 | Status: SHIPPED | OUTPATIENT
Start: 2022-03-30 | End: 2022-10-14 | Stop reason: SDUPTHER

## 2022-04-11 ENCOUNTER — HOSPITAL ENCOUNTER (OUTPATIENT)
Age: 77
Discharge: HOME | End: 2022-04-11
Payer: COMMERCIAL

## 2022-04-11 LAB
ANION GAP SERPL CALCULATED.3IONS-SCNC: 11 MMOL/L (ref 4–16)
BUN BLDV-MCNC: 17 MG/DL (ref 6–23)
CALCIUM SERPL-MCNC: 9.3 MG/DL (ref 8.3–10.6)
CHLORIDE BLD-SCNC: 102 MMOL/L (ref 99–110)
CO2: 26 MMOL/L (ref 21–32)
CREAT SERPL-MCNC: 1.1 MG/DL (ref 0.9–1.3)
GFR AFRICAN AMERICAN: >60 ML/MIN/1.73M2
GFR NON-AFRICAN AMERICAN: >60 ML/MIN/1.73M2
GLUCOSE BLD-MCNC: 154 MG/DL (ref 70–99)
POTASSIUM SERPL-SCNC: 3.9 MMOL/L (ref 3.5–5.1)
SODIUM BLD-SCNC: 139 MMOL/L (ref 135–145)

## 2022-04-11 PROCEDURE — 80048 BASIC METABOLIC PNL TOTAL CA: CPT

## 2022-04-11 PROCEDURE — 36415 COLL VENOUS BLD VENIPUNCTURE: CPT

## 2022-04-25 ENCOUNTER — OFFICE VISIT (OUTPATIENT)
Dept: FAMILY MEDICINE CLINIC | Age: 77
End: 2022-04-25
Payer: COMMERCIAL

## 2022-04-25 DIAGNOSIS — R06.02 SOB (SHORTNESS OF BREATH): Primary | ICD-10-CM

## 2022-04-25 PROCEDURE — 99213 OFFICE O/P EST LOW 20 MIN: CPT | Performed by: NURSE PRACTITIONER

## 2022-04-25 NOTE — PROGRESS NOTES
4/25/22  Keri Peck  1945    FLU/COVID-19 CLINIC EVALUATION    HPI SYMPTOMS:    Employer:   Retired    [] Fevers  [] Chills  [] Cough  [] Coughing up blood  [x] Chest Congestion  [] Nasal Congestion  [x] Feeling short of breath  [] Sometimes  [] Frequently  [x] All the time  [] Chest pain  [] Headaches  []Tolerable  [] Severe  [] Sore throat  [] Muscle aches  [] Nausea  [] Vomiting  []Unable to keep fluids down  [] Diarrhea  []Severe    [x] OTHER SYMPTOMS:  He thinks he has pneumonia. Lungs feel full. Recently got over a cold. Symptom Duration:   [] 1  [x] 2   [] 3   [] 4    [] 5   [] 6   [] 7   [] 8   [] 9   [] 10   [] 11   [] 12   [] 13   [] 14   [] Longer than 14 days    Symptom course:   [x] Worsening     [] Stable     [] Improving    RISK FACTORS:    [] Pregnant or possibly pregnant  [x] Age over 61  [] Diabetes  [] Heart disease  [] Asthma  [] COPD/Other chronic lung diseases  [] Active Cancer  [] On Chemotherapy  [] Taking oral steroids  [] History Lymphoma/Leukemia  [] Close contact with a lab confirmed COVID-19 patient within 14 days of symptom onset  [] History of travel from affected geographical areas within 14 days of symptom onset       VITALS:  There were no vitals filed for this visit. TESTS:    POCT FLU:  [] Positive     []Negative    ASSESSMENT:    [] Flu  [] Possible COVID-19  [] Strep    PLAN:    [] Discharge home with written instructions for:  [] Flu management  [] Possible COVID-19 infection with self-quarantine and management of symptoms  [] Follow-up with primary care physician or emergency department if worsens  [] Evaluation per physician/NP/PA in clinic  [] Sent to ER       An  electronic signature was used to authenticate this note.      --Fidel Herrera LPN on 6/67/8232 at 3:29 PM

## 2022-04-25 NOTE — PATIENT INSTRUCTIONS
Your COVID 19 test can take 1-5 days for the results to come back. We ask that you make a Mychart page and view your test results this way. You will need to Self quarantine until you know your results. If positive, please work on contact tracing. Increase fluids and rest  Saline nasal spray as needed for nasal congestion  Warm salt gargles as needed for throat discomfort  Monitor temperature twice a day  Tylenol as needed for fevers and/or discomfort. Big deep breaths periodically throughout the day  Regular Mucinex over the counter as needed for chest congestion  If symptoms worsen -Go to the ER.    Follow up with your primary care provider      To Whom it May Concern:

## 2022-04-29 VITALS
TEMPERATURE: 97.2 F | WEIGHT: 282 LBS | HEIGHT: 68 IN | BODY MASS INDEX: 42.74 KG/M2 | OXYGEN SATURATION: 93 % | HEART RATE: 67 BPM

## 2022-04-29 NOTE — PROGRESS NOTES
4/25/2022    HPI:  Chief complaint and history of present illness as per medical assistant/nurse documented today in the Flu/COVID-19 clinic. Patient is here with complaints of SOB. Patient states he thinks he may have pneumonia due to SOB. Patient denies any fever, cough, and chest congestion. Patient's wife states patient mid March had fluid drained form his abdomen. Patient states this is around the third time he has had the ascites drained from his abdomen. In March patient had 61427 cc of fluid drained from his abdomen. Patient's wife states his abdomen is filling back up and is about as big as it was in March. Patient has alcoholic cirrhosis of the live with ascites. Patient states when his abdomen fills back up with fluid he becomes SOB every time. MEDICATIONS:  Prior to Visit Medications    Medication Sig Taking?  Authorizing Provider   levothyroxine (SYNTHROID) 50 MCG tablet Take 1 tablet by mouth Daily  Kristen Martines MD   furosemide (LASIX) 40 MG tablet Take 1 tablet by mouth 2 times daily TAKE 1 tab am and pm  Rudi Arteaga MD   spironolactone (ALDACTONE) 100 MG tablet Take 1 tablet by mouth daily  Rudi Arteaga MD   lactulose (CHRONULAC) 10 GM/15ML solution Take 30 mLs by mouth 3 times daily Hold if having diarrhea  Rudi Arteaga MD   rifaximin (XIFAXAN) 550 MG tablet Take 1 tablet by mouth 2 times daily  Rudi Arteaga MD   DULoxetine (CYMBALTA) 60 MG extended release capsule TAKE 1 CAPSULE BY MOUTH ONCE DAILY  Kristen Martines MD   nadolol (CORGARD) 40 MG tablet Take 1 tablet by mouth daily  Kristen Martines MD   ferrous sulfate (IRON 325) 325 (65 Fe) MG tablet Take 1 tablet by mouth  Historical Provider, MD   zinc gluconate 50 MG tablet Take 50 mg by mouth 2 times daily  Historical Provider, MD   b complex vitamins capsule Take 1 capsule by mouth daily  Historical Provider, MD   albuterol sulfate HFA (VENTOLIN HFA) 108 (90 Base) MCG/ACT inhaler Inhale 2 puffs into the lungs 4 times daily as needed for Wheezing  Patient not taking: Reported on 3/11/2022  Diana Osler, MD   tadalafil (CIALIS) 20 MG tablet Take 20 mg by mouth every 3 days   Historical Provider, MD   vitamin E 400 UNIT capsule Take 800 Units by mouth daily  Historical Provider, MD   magnesium oxide (MAG-OX) 400 MG tablet Take 200 mg by mouth 2 times daily OTC 1 qd  Historical Provider, MD   Multiple Vitamin (MULTI VITAMIN MENS PO) Take 1 tablet by mouth daily  Historical Provider, MD   ESOMEPRAZOLE MAGNESIUM PO Take 20 mg by mouth daily OTC  Historical Provider, MD   [place]-3 1000 MG CAPS Take 1 capsule by mouth daily  Historical Provider, MD       Allergies   Allergen Reactions    Lisinopril Swelling     Tongue swelling      Zetia [Ezetimibe] Swelling     Facial swelling    Atorvastatin     Codeine Other (See Comments)     Blood pressure drops    Hydrochlorothiazide     Hydroxyzine      Fatigue and depressed    Lexapro [Escitalopram Oxalate]      Increased depression    Pravastatin    ,   Past Medical History:   Diagnosis Date    Anxiety     [facility]     GERD (gastroesophageal reflux disease)     GERD (gastroesophageal reflux disease)     Hyperlipidemia     Hypertension     Portal hypertension ([facility]     Pulmonary nodules     Sleep apnea     SOB (shortness of breath)    ,   Past Surgical History:   Procedure Laterality Date    CHOLECYSTECTOMY      COLONOSCOPY      ENDOSCOPY, COLON, DIAGNOSTIC      FOOT SURGERY      needle removed,not sure which foot, per wife.     UPPER GASTROINTESTINAL ENDOSCOPY N/A 2022    EGD BIOPSY performed by Uday Lopez MD at [address] [place] [address]     ,   Social History     Tobacco Use    Smoking status: Former Smoker     Packs/day: 1.00     Years: 14.00     Pack years: 14.00     Quit date:      Years since quittin.3    Smokeless tobacco: Never Used   Vaping Use    Vaping Use: Never used   Substance Use Topics    Alcohol use: Not Currently    Drug use: Never   ,   Family History   Problem Relation Age of Onset    Hypertension Brother     Diabetes Other    ,   Immunization History   Administered Date(s) Administered    COVID-19, Pfizer Purple top, DILUTE for use, 12+ yrs, 30mcg/0.3mL dose 05/18/2021, 06/08/2021    Influenza, Quadv, adjuvanted, 65 yrs +, IM, PF (Fluad) 10/21/2020    Pneumococcal Conjugate 13-valent ([id number]) 04/27/2016    Pneumococcal Polysaccharide ([id number]) 10/21/2020    Tdap (Boostrix, Adacel) 04/27/2016   ,   Health Maintenance   Topic Date Due    Annual Wellness Visit (AWV)  Never done    Shingles vaccine (1 of 2) Never done    COVID-19 Vaccine (3 - Booster for Li Peter series) 11/08/2021    Hepatitis A vaccine (2 of 3 - Hep A Twinrix risk 3-dose series) 01/15/2022    Hepatitis B vaccine (2 of 3 - Hep B Twinrix risk 3-dose series) 01/15/2022    Depression Screen  05/07/2022    A1C test (Diabetic or Prediabetic)  08/09/2022    Flu vaccine (Season Ended) 09/01/2022    TSH  03/29/2023    Potassium  04/11/2023    Creatinine  04/11/2023    Lipids  10/14/2024    DTaP/Tdap/Td vaccine (2 - Td or Tdap) 04/27/2026    Colorectal Cancer Screen  01/14/2029    Pneumococcal 65+ years Vaccine  Completed    AAA screen  Completed    Hepatitis C screen  Completed    Hib vaccine  Aged Out    Meningococcal (ACWY) vaccine  Aged Out       PHYSICAL EXAM:  Physical Exam  Constitutional:       Appearance: Normal appearance. HENT:      Head: Normocephalic. Right Ear: Tympanic membrane, ear canal and external ear normal.      Left Ear: Tympanic membrane, ear canal and external ear normal.      Nose: Nose normal.      Mouth/Throat:      Lips: Pink. Mouth: Mucous membranes are moist.      Pharynx: Oropharynx is clear. Cardiovascular:      Rate and Rhythm: Normal rate and regular rhythm. Heart sounds: Normal heart sounds.    Pulmonary:      Effort: Pulmonary effort is normal.      Breath sounds: Normal breath sounds. Abdominal:      General: Bowel sounds are normal.      Tenderness: There is no abdominal tenderness. Comments: Abdomen hard and swollen. Musculoskeletal:      Cervical back: Neck supple. Skin:     General: Skin is warm and dry. Neurological:      Mental Status: He is alert and oriented to person, place, and time. Psychiatric:         Mood and Affect: Mood normal.         Behavior: Behavior normal.         ASSESSMENT/PLAN:  1. SOB (shortness of breath)  Explained to patient that likely the ascites in his abdomen is causing the SOB. Advised patient to go to ER. Patient at first refused then said he would have his wife take him to the ER. The patients records were reviewed and discussed. Time was given for questions . All questions were answered to the patients satisfaction. A total time of 25 was spent with at least 50% related to counseling and coordination of care. FOLLOW-UP:  Return if symptoms worsen or fail to improve.     In addition to other information, the printed after visit summary provided to the patient includes:  [] COVID-19 Self care instructions  [] COVID-19 General patient information

## 2022-05-02 ENCOUNTER — TELEPHONE (OUTPATIENT)
Dept: FAMILY MEDICINE CLINIC | Age: 77
End: 2022-05-02

## 2022-05-02 ENCOUNTER — APPOINTMENT (OUTPATIENT)
Dept: GENERAL RADIOLOGY | Age: 77
DRG: 432 | End: 2022-05-02
Payer: MEDICARE

## 2022-05-02 ENCOUNTER — HOSPITAL ENCOUNTER (INPATIENT)
Age: 77
LOS: 14 days | Discharge: NURSING, REHAB OR LONG-TERM CARE | DRG: 432 | End: 2022-05-18
Attending: EMERGENCY MEDICINE | Admitting: HOSPITALIST
Payer: MEDICARE

## 2022-05-02 ENCOUNTER — APPOINTMENT (OUTPATIENT)
Dept: CT IMAGING | Age: 77
DRG: 432 | End: 2022-05-02
Payer: MEDICARE

## 2022-05-02 DIAGNOSIS — K70.31 ALCOHOLIC CIRRHOSIS OF LIVER WITH ASCITES (HCC): ICD-10-CM

## 2022-05-02 DIAGNOSIS — J96.21 ACUTE ON CHRONIC RESPIRATORY FAILURE WITH HYPOXEMIA (HCC): Primary | ICD-10-CM

## 2022-05-02 DIAGNOSIS — J90 RECURRENT RIGHT PLEURAL EFFUSION: ICD-10-CM

## 2022-05-02 DIAGNOSIS — J90 PLEURAL EFFUSION: ICD-10-CM

## 2022-05-02 LAB
ADENOVIRUS DETECTION BY PCR: NOT DETECTED
ALBUMIN SERPL-MCNC: 3.3 GM/DL (ref 3.4–5)
ALP BLD-CCNC: 118 IU/L (ref 40–129)
ALT SERPL-CCNC: 20 U/L (ref 10–40)
AMMONIA: 69 UMOL/L (ref 16–60)
ANION GAP SERPL CALCULATED.3IONS-SCNC: 9 MMOL/L (ref 4–16)
APTT: 35.1 SECONDS (ref 25.1–37.1)
AST SERPL-CCNC: 39 IU/L (ref 15–37)
BASE EXCESS MIXED: 6.1 (ref 0–1.2)
BASOPHILS ABSOLUTE: 0.1 K/CU MM
BASOPHILS RELATIVE PERCENT: 1 % (ref 0–1)
BILIRUB SERPL-MCNC: 0.6 MG/DL (ref 0–1)
BORDETELLA PARAPERTUSSIS BY PCR: NOT DETECTED
BORDETELLA PERTUSSIS PCR: NOT DETECTED
BUN BLDV-MCNC: 20 MG/DL (ref 6–23)
CALCIUM SERPL-MCNC: 9.6 MG/DL (ref 8.3–10.6)
CHLAMYDOPHILA PNEUMONIA PCR: NOT DETECTED
CHLORIDE BLD-SCNC: 103 MMOL/L (ref 99–110)
CO2: 28 MMOL/L (ref 21–32)
CORONAVIRUS 229E PCR: NOT DETECTED
CORONAVIRUS HKU1 PCR: NOT DETECTED
CORONAVIRUS NL63 PCR: NOT DETECTED
CORONAVIRUS OC43 PCR: NOT DETECTED
CREAT SERPL-MCNC: 1.1 MG/DL (ref 0.9–1.3)
DIFFERENTIAL TYPE: ABNORMAL
EOSINOPHILS ABSOLUTE: 0.5 K/CU MM
EOSINOPHILS RELATIVE PERCENT: 6.4 % (ref 0–3)
GFR AFRICAN AMERICAN: >60 ML/MIN/1.73M2
GFR NON-AFRICAN AMERICAN: >60 ML/MIN/1.73M2
GLUCOSE BLD-MCNC: 80 MG/DL (ref 70–99)
HCO3 VENOUS: 34 MMOL/L (ref 19–25)
HCT VFR BLD CALC: 43.4 % (ref 42–52)
HEMOGLOBIN: 13.8 GM/DL (ref 13.5–18)
HUMAN METAPNEUMOVIRUS PCR: NOT DETECTED
IMMATURE NEUTROPHIL %: 0.3 % (ref 0–0.43)
INFLUENZA A BY PCR: NOT DETECTED
INFLUENZA A H1 (2009) PCR: NOT DETECTED
INFLUENZA A H1 PANDEMIC PCR: NOT DETECTED
INFLUENZA A H3 PCR: NOT DETECTED
INFLUENZA B BY PCR: NOT DETECTED
INR BLD: 1.1 INDEX
LYMPHOCYTES ABSOLUTE: 1.1 K/CU MM
LYMPHOCYTES RELATIVE PERCENT: 15 % (ref 24–44)
MCH RBC QN AUTO: 32 PG (ref 27–31)
MCHC RBC AUTO-ENTMCNC: 31.8 % (ref 32–36)
MCV RBC AUTO: 100.7 FL (ref 78–100)
MONOCYTES ABSOLUTE: 0.8 K/CU MM
MONOCYTES RELATIVE PERCENT: 12 % (ref 0–4)
MYCOPLASMA PNEUMONIAE PCR: NOT DETECTED
NUCLEATED RBC %: 0 %
O2 SAT, VEN: 92.1 % (ref 50–70)
PARAINFLUENZA 1 PCR: NOT DETECTED
PARAINFLUENZA 2 PCR: NOT DETECTED
PARAINFLUENZA 3 PCR: NOT DETECTED
PARAINFLUENZA 4 PCR: NOT DETECTED
PCO2, VEN: 63 MMHG (ref 38–52)
PDW BLD-RTO: 14.8 % (ref 11.7–14.9)
PH VENOUS: 7.34 (ref 7.32–7.42)
PLATELET # BLD: 216 K/CU MM (ref 140–440)
PMV BLD AUTO: 10.6 FL (ref 7.5–11.1)
PO2, VEN: 75 MMHG (ref 28–48)
POTASSIUM SERPL-SCNC: 3.8 MMOL/L (ref 3.5–5.1)
PRO-BNP: 176.8 PG/ML
PROTHROMBIN TIME: 14.2 SECONDS (ref 11.7–14.5)
RBC # BLD: 4.31 M/CU MM (ref 4.6–6.2)
RHINOVIRUS ENTEROVIRUS PCR: ABNORMAL
RSV PCR: NOT DETECTED
SARS-COV-2: NOT DETECTED
SEGMENTED NEUTROPHILS ABSOLUTE COUNT: 4.6 K/CU MM
SEGMENTED NEUTROPHILS RELATIVE PERCENT: 65.3 % (ref 36–66)
SODIUM BLD-SCNC: 140 MMOL/L (ref 135–145)
TOTAL IMMATURE NEUTOROPHIL: 0.02 K/CU MM
TOTAL NUCLEATED RBC: 0 K/CU MM
TOTAL PROTEIN: 8.4 GM/DL (ref 6.4–8.2)
TROPONIN T: 0.02 NG/ML
WBC # BLD: 7 K/CU MM (ref 4–10.5)

## 2022-05-02 PROCEDURE — 82150 ASSAY OF AMYLASE: CPT

## 2022-05-02 PROCEDURE — 2060000000 HC ICU INTERMEDIATE R&B

## 2022-05-02 PROCEDURE — 71045 X-RAY EXAM CHEST 1 VIEW: CPT

## 2022-05-02 PROCEDURE — 87205 SMEAR GRAM STAIN: CPT

## 2022-05-02 PROCEDURE — 80053 COMPREHEN METABOLIC PANEL: CPT

## 2022-05-02 PROCEDURE — 2709999900 HC NON-CHARGEABLE SUPPLY

## 2022-05-02 PROCEDURE — 88108 CYTOPATH CONCENTRATE TECH: CPT

## 2022-05-02 PROCEDURE — 85730 THROMBOPLASTIN TIME PARTIAL: CPT

## 2022-05-02 PROCEDURE — 2580000003 HC RX 258: Performed by: NURSE PRACTITIONER

## 2022-05-02 PROCEDURE — G0378 HOSPITAL OBSERVATION PER HR: HCPCS

## 2022-05-02 PROCEDURE — 6370000000 HC RX 637 (ALT 250 FOR IP): Performed by: NURSE PRACTITIONER

## 2022-05-02 PROCEDURE — 85025 COMPLETE CBC W/AUTO DIFF WBC: CPT

## 2022-05-02 PROCEDURE — 83615 LACTATE (LD) (LDH) ENZYME: CPT

## 2022-05-02 PROCEDURE — 87075 CULTR BACTERIA EXCEPT BLOOD: CPT

## 2022-05-02 PROCEDURE — 96375 TX/PRO/DX INJ NEW DRUG ADDON: CPT

## 2022-05-02 PROCEDURE — 32554 ASPIRATE PLEURA W/O IMAGING: CPT

## 2022-05-02 PROCEDURE — 83880 ASSAY OF NATRIURETIC PEPTIDE: CPT

## 2022-05-02 PROCEDURE — 93005 ELECTROCARDIOGRAM TRACING: CPT | Performed by: EMERGENCY MEDICINE

## 2022-05-02 PROCEDURE — 82805 BLOOD GASES W/O2 SATURATION: CPT

## 2022-05-02 PROCEDURE — 32555 ASPIRATE PLEURA W/ IMAGING: CPT

## 2022-05-02 PROCEDURE — 6360000002 HC RX W HCPCS: Performed by: NURSE PRACTITIONER

## 2022-05-02 PROCEDURE — 82140 ASSAY OF AMMONIA: CPT

## 2022-05-02 PROCEDURE — 0W993ZZ DRAINAGE OF RIGHT PLEURAL CAVITY, PERCUTANEOUS APPROACH: ICD-10-PCS | Performed by: EMERGENCY MEDICINE

## 2022-05-02 PROCEDURE — 84484 ASSAY OF TROPONIN QUANT: CPT

## 2022-05-02 PROCEDURE — 85610 PROTHROMBIN TIME: CPT

## 2022-05-02 PROCEDURE — C1729 CATH, DRAINAGE: HCPCS

## 2022-05-02 PROCEDURE — 0202U NFCT DS 22 TRGT SARS-COV-2: CPT

## 2022-05-02 PROCEDURE — 82042 OTHER SOURCE ALBUMIN QUAN EA: CPT

## 2022-05-02 PROCEDURE — 96374 THER/PROPH/DIAG INJ IV PUSH: CPT

## 2022-05-02 PROCEDURE — 89051 BODY FLUID CELL COUNT: CPT

## 2022-05-02 PROCEDURE — 99285 EMERGENCY DEPT VISIT HI MDM: CPT

## 2022-05-02 PROCEDURE — 82945 GLUCOSE OTHER FLUID: CPT

## 2022-05-02 PROCEDURE — 87070 CULTURE OTHR SPECIMN AEROBIC: CPT

## 2022-05-02 PROCEDURE — 84157 ASSAY OF PROTEIN OTHER: CPT

## 2022-05-02 PROCEDURE — 88305 TISSUE EXAM BY PATHOLOGIST: CPT

## 2022-05-02 RX ORDER — LACTULOSE 10 G/15ML
20 SOLUTION ORAL 3 TIMES DAILY
Status: DISCONTINUED | OUTPATIENT
Start: 2022-05-02 | End: 2022-05-18 | Stop reason: HOSPADM

## 2022-05-02 RX ORDER — ONDANSETRON 2 MG/ML
4 INJECTION INTRAMUSCULAR; INTRAVENOUS EVERY 6 HOURS PRN
Status: DISCONTINUED | OUTPATIENT
Start: 2022-05-02 | End: 2022-05-18 | Stop reason: HOSPADM

## 2022-05-02 RX ORDER — ACETAMINOPHEN 325 MG/1
650 TABLET ORAL EVERY 6 HOURS PRN
Status: DISCONTINUED | OUTPATIENT
Start: 2022-05-02 | End: 2022-05-18 | Stop reason: HOSPADM

## 2022-05-02 RX ORDER — SODIUM CHLORIDE 0.9 % (FLUSH) 0.9 %
5-40 SYRINGE (ML) INJECTION EVERY 12 HOURS SCHEDULED
Status: DISCONTINUED | OUTPATIENT
Start: 2022-05-02 | End: 2022-05-10 | Stop reason: SDUPTHER

## 2022-05-02 RX ORDER — SODIUM CHLORIDE 0.9 % (FLUSH) 0.9 %
10 SYRINGE (ML) INJECTION PRN
Status: DISCONTINUED | OUTPATIENT
Start: 2022-05-02 | End: 2022-05-18

## 2022-05-02 RX ORDER — LEVOTHYROXINE SODIUM 0.03 MG/1
50 TABLET ORAL DAILY
Status: DISCONTINUED | OUTPATIENT
Start: 2022-05-03 | End: 2022-05-18 | Stop reason: HOSPADM

## 2022-05-02 RX ORDER — DULOXETIN HYDROCHLORIDE 30 MG/1
60 CAPSULE, DELAYED RELEASE ORAL DAILY
Status: DISCONTINUED | OUTPATIENT
Start: 2022-05-02 | End: 2022-05-18 | Stop reason: HOSPADM

## 2022-05-02 RX ORDER — ENOXAPARIN SODIUM 100 MG/ML
30 INJECTION SUBCUTANEOUS 2 TIMES DAILY
Status: DISCONTINUED | OUTPATIENT
Start: 2022-05-02 | End: 2022-05-02

## 2022-05-02 RX ORDER — FUROSEMIDE 20 MG/1
40 TABLET ORAL 2 TIMES DAILY
Status: DISCONTINUED | OUTPATIENT
Start: 2022-05-02 | End: 2022-05-05

## 2022-05-02 RX ORDER — SODIUM CHLORIDE 9 MG/ML
INJECTION, SOLUTION INTRAVENOUS PRN
Status: DISCONTINUED | OUTPATIENT
Start: 2022-05-02 | End: 2022-05-18 | Stop reason: HOSPADM

## 2022-05-02 RX ORDER — FUROSEMIDE 10 MG/ML
60 INJECTION INTRAMUSCULAR; INTRAVENOUS ONCE
Status: COMPLETED | OUTPATIENT
Start: 2022-05-02 | End: 2022-05-02

## 2022-05-02 RX ORDER — POLYETHYLENE GLYCOL 3350 17 G
2 POWDER IN PACKET (EA) ORAL
Status: DISCONTINUED | OUTPATIENT
Start: 2022-05-02 | End: 2022-05-18 | Stop reason: HOSPADM

## 2022-05-02 RX ORDER — KETOROLAC TROMETHAMINE 30 MG/ML
15 INJECTION, SOLUTION INTRAMUSCULAR; INTRAVENOUS ONCE
Status: COMPLETED | OUTPATIENT
Start: 2022-05-02 | End: 2022-05-02

## 2022-05-02 RX ORDER — ENOXAPARIN SODIUM 100 MG/ML
30 INJECTION SUBCUTANEOUS 2 TIMES DAILY
Status: DISCONTINUED | OUTPATIENT
Start: 2022-05-03 | End: 2022-05-07

## 2022-05-02 RX ORDER — ALPRAZOLAM 0.25 MG/1
0.5 TABLET ORAL DAILY PRN
Status: DISCONTINUED | OUTPATIENT
Start: 2022-05-02 | End: 2022-05-18 | Stop reason: HOSPADM

## 2022-05-02 RX ORDER — ACETAMINOPHEN 650 MG/1
650 SUPPOSITORY RECTAL EVERY 6 HOURS PRN
Status: DISCONTINUED | OUTPATIENT
Start: 2022-05-02 | End: 2022-05-18 | Stop reason: HOSPADM

## 2022-05-02 RX ORDER — ALBUTEROL SULFATE 90 UG/1
2 AEROSOL, METERED RESPIRATORY (INHALATION) EVERY 4 HOURS PRN
Status: DISCONTINUED | OUTPATIENT
Start: 2022-05-02 | End: 2022-05-18 | Stop reason: HOSPADM

## 2022-05-02 RX ORDER — LANOLIN ALCOHOL/MO/W.PET/CERES
200 CREAM (GRAM) TOPICAL 2 TIMES DAILY
Status: DISCONTINUED | OUTPATIENT
Start: 2022-05-02 | End: 2022-05-18 | Stop reason: HOSPADM

## 2022-05-02 RX ORDER — PANTOPRAZOLE SODIUM 40 MG/10ML
40 INJECTION, POWDER, LYOPHILIZED, FOR SOLUTION INTRAVENOUS
Status: DISCONTINUED | OUTPATIENT
Start: 2022-05-02 | End: 2022-05-05

## 2022-05-02 RX ORDER — NADOLOL 20 MG/1
40 TABLET ORAL DAILY
Status: DISCONTINUED | OUTPATIENT
Start: 2022-05-02 | End: 2022-05-18 | Stop reason: HOSPADM

## 2022-05-02 RX ORDER — SPIRONOLACTONE 50 MG/1
100 TABLET, FILM COATED ORAL DAILY
Status: DISCONTINUED | OUTPATIENT
Start: 2022-05-02 | End: 2022-05-18 | Stop reason: HOSPADM

## 2022-05-02 RX ORDER — LACTULOSE 10 G/15ML
30 SOLUTION ORAL ONCE
Status: COMPLETED | OUTPATIENT
Start: 2022-05-02 | End: 2022-05-02

## 2022-05-02 RX ADMIN — NADOLOL 40 MG: 20 TABLET ORAL at 17:33

## 2022-05-02 RX ADMIN — RIFAXIMIN 550 MG: 550 TABLET ORAL at 21:41

## 2022-05-02 RX ADMIN — FUROSEMIDE 60 MG: 10 INJECTION, SOLUTION INTRAVENOUS at 17:32

## 2022-05-02 RX ADMIN — SODIUM CHLORIDE, PRESERVATIVE FREE 5 ML: 5 INJECTION INTRAVENOUS at 22:30

## 2022-05-02 RX ADMIN — LACTULOSE 30 G: 10 SOLUTION ORAL at 17:33

## 2022-05-02 RX ADMIN — DULOXETINE 60 MG: 30 CAPSULE, DELAYED RELEASE ORAL at 21:41

## 2022-05-02 RX ADMIN — KETOROLAC TROMETHAMINE 15 MG: 30 INJECTION, SOLUTION INTRAMUSCULAR at 17:33

## 2022-05-02 RX ADMIN — LACTULOSE 20 G: 10 SOLUTION ORAL at 21:41

## 2022-05-02 RX ADMIN — MAGNESIUM OXIDE 400 MG (241.3 MG MAGNESIUM) TABLET 200 MG: TABLET at 21:40

## 2022-05-02 ASSESSMENT — PAIN - FUNCTIONAL ASSESSMENT
PAIN_FUNCTIONAL_ASSESSMENT: NONE - DENIES PAIN
PAIN_FUNCTIONAL_ASSESSMENT: NONE - DENIES PAIN

## 2022-05-02 ASSESSMENT — ENCOUNTER SYMPTOMS
ALLERGIC/IMMUNOLOGIC NEGATIVE: 1
SHORTNESS OF BREATH: 1
EYES NEGATIVE: 1
ABDOMINAL DISTENTION: 1

## 2022-05-02 NOTE — H&P
V2.0  History and Physical      Name:  Ori Mondragon /Age/Sex: 1945  (78 y.o. male)   MRN & CSN:  [id number] & [id number] Encounter Date/Time: 2022 4:27 PM EDT   Location:  [facility] PCP: Valdo Dodson, [address] Day: 1    Assessment and Plan:   Ori Mondragon is a 78 y.o. male with a pmh of hypertension, cirrhosis with portal hypertension and ascites who presents with Pleural effusion    Hospital Problems           Last Modified POA    * (Principal) Pleural effusion 2022 Yes        Acute respiratory failure  Pleural effusion  -Worsening shortness of breath for 5 days  -X-ray showed complete opacitifiacation of the right hemithorax likely due to a large right pleural effusion  -Admit to observation  -IR consult for thoracentesis: spoke with on call Dr. Caity Maxwell who suggested to get cta pulmonary and abdomen. Will call back when results are available. -Oxygen panel  -Continue home diuretics    Alcoholic cirrhosis with ascites  Portal hypertension  -Slightly elevated ammonia level, patient is alert and oriented  -Continue diuretics for ascites  -Continue Nadolol for portal hypertension  -continue rifaximin and lactulose to prevent encephalopathy    Hypothyroidism  -Continue Synthroid 50 MCG    DVT prophylaxis  -Lovenox, start after thoracentesis    Disposition:   Current Living situation: Home  Expected Disposition: Home  Estimated D/C: 1 to 2 days    Diet Diet NPO   DVT Prophylaxis [] Lovenox, []  Heparin, [] SCDs, [] Ambulation,  [] Eliquis, [] Xarelto   Code Status Full Code   Surrogate Decision Maker/ POA      History from:     Patient    History of Present Illness:     Chief Complaint: Pleural effusion  Ori Mondragon is a 78 y.o. male with pmh of hypertension, alcoholic cirrhosis with recurrent ascites and portal hypertension, hypothyroidism who presents with shortness of breath. Patient reported worsening shortness of breath in the past 5 days.   He could not even lay flat at home. Patient denied a cough. No chills and no fever. Patient visited ED today. His chest x-ray showed total opacity of right hemithorax. He was also found to hypoxia. He has to wear 2 L NC. Patient will be admitted to hospital for IR consult. Review of Systems: Need 10 Elements   Review of Systems   Constitutional: Negative. HENT: Negative. Eyes: Negative. Respiratory: Positive for shortness of breath. Cardiovascular: Negative. Gastrointestinal: Positive for abdominal distention. Endocrine: Negative. Genitourinary: Negative. Musculoskeletal: Negative. Skin: Negative. Allergic/Immunologic: Negative. Neurological: Negative. Hematological: Negative. Psychiatric/Behavioral: Negative. Objective:   No intake or output data in the 24 hours ending 05/02/22 1713   Vitals:   Vitals:    05/02/22 1152 05/02/22 1217 05/02/22 1500 05/02/22 1600   BP: 136/67 (!) 178/83 (!) 144/67 (!) 162/70   Pulse: 67 64 67 69   Resp: 14 18 18 18   Temp: 97.7 °F (36.5 °C) 98.3 °F (36.8 °C)     TempSrc: Oral Oral     SpO2: 91% 100% 95% 97%   Weight: 285 lb (129.3 kg) 285 lb (129.3 kg)     Height: 5' 8\" (1.727 m) 5' 8\" (1.727 m)         Medications Prior to Admission     Prior to Admission medications    Medication Sig Start Date End Date Taking?  Authorizing Provider   levothyroxine (SYNTHROID) 50 MCG tablet Take 1 tablet by mouth Daily 3/30/22 9/26/22  Anu Reynolds MD   furosemide (LASIX) 40 MG tablet Take 1 tablet by mouth 2 times daily TAKE 1 tab am and pm 3/21/22   Krunal Green MD   spironolactone (ALDACTONE) 100 MG tablet Take 1 tablet by mouth daily 3/21/22 4/20/22  Krunal Green MD   lactulose [facility]) 10 GM/15ML solution Take 30 mLs by mouth 3 times daily Hold if having diarrhea 3/21/22   Krunal Green MD   rifaximin (XIFAXAN) 550 MG tablet Take 1 tablet by mouth 2 times daily 3/21/22   Krunal Green MD   DULoxetine (CYMBALTA) 60 MG extended release capsule TAKE 1 CAPSULE BY MOUTH ONCE DAILY 3/11/22 1/13/23  Brandon Laguna MD   nadolol (CORGARD) 40 MG tablet Take 1 tablet by mouth daily 11/18/21 2/14/22  Brandon Laguna MD   ferrous sulfate (IRON 325) 325 (65 Fe) MG tablet Take 1 tablet by mouth    Historical Provider, MD   zinc gluconate 50 MG tablet Take 50 mg by mouth 2 times daily    Historical Provider, MD   b complex vitamins capsule Take 1 capsule by mouth daily    Historical Provider, MD   albuterol sulfate HFA (VENTOLIN HFA) 108 (90 Base) MCG/ACT inhaler Inhale 2 puffs into the lungs 4 times daily as needed for Wheezing  Patient not taking: Reported on 3/11/2022 8/30/21   Brandon Laguna MD   tadalafil (CIALIS) 20 MG tablet Take 20 mg by mouth every 3 days     Historical Provider, MD   vitamin E 400 UNIT capsule Take 800 Units by mouth daily    Historical Provider, MD   magnesium oxide (MAG-OX) 400 MG tablet Take 200 mg by mouth 2 times daily OTC 1 qd 10/15/19   Historical Provider, MD   Multiple Vitamin (MULTI VITAMIN MENS PO) Take 1 tablet by mouth daily    Historical Provider, MD   ESOMEPRAZOLE MAGNESIUM PO Take 20 mg by mouth daily OTC    Historical Provider, MD   [place]-3 1000 MG CAPS Take 1 capsule by mouth daily    Historical Provider, MD       Physical Exam: Need 8 Elements   Physical Exam  HENT:      Head: Normocephalic. Nose: Nose normal.      Mouth/Throat:      Mouth: Mucous membranes are moist.   Eyes:      Pupils: Pupils are equal, round, and reactive to light. Cardiovascular:      Rate and Rhythm: Normal rate. Pulses: Normal pulses. Pulmonary:      Comments: Diminished breathing sound in right lung  Abdominal:      General: Abdomen is flat. Musculoskeletal:         General: Normal range of motion. Cervical back: Normal range of motion. Skin:     General: Skin is warm. Capillary Refill: Capillary refill takes less than 2 seconds.    Neurological:      General: No focal deficit present. Mental Status: He is alert and oriented to person, place, and time. Psychiatric:         Mood and Affect: Mood normal.          Past Medical History:   PMHx   Past Medical History:   Diagnosis Date    Anxiety     Cirrhosis, alcoholic ([facility] [address]     GERD (gastroesophageal reflux disease)     GERD (gastroesophageal reflux disease)     Hyperlipidemia     Hypertension     Portal hypertension ([facility] [address]     Pulmonary nodules     Sleep apnea     SOB (shortness of breath)      PSHX:  has a past surgical history that includes Cholecystectomy; Vasectomy; Colonoscopy; Endoscopy, colon, diagnostic; Foot surgery; and Upper gastrointestinal endoscopy (N/A, 2022). Allergies: Allergies   Allergen Reactions    Lisinopril Swelling     Tongue swelling      Zetia [Ezetimibe] Swelling     Facial swelling    Atorvastatin     Codeine Other (See Comments)     Blood pressure drops    Hydrochlorothiazide     Hydroxyzine      Fatigue and depressed    Lexapro [Escitalopram Oxalate]      Increased depression    Pravastatin      Fam HX: family history includes Diabetes in an other family member; Hypertension in his brother.   Soc HX:   Social History     Socioeconomic History    Marital status:      Spouse name: Not on file    Number of children: Not on file    Years of education: Not on file    Highest education level: Not on file   Occupational History     Comment: Retired    Tobacco Use    Smoking status: Former Smoker     Packs/day: 1.00     Years: 14.00     Pack years: 14.00     Quit date:      Years since quittin.3    Smokeless tobacco: Never Used   Vaping Use    Vaping Use: Never used   Substance and Sexual Activity    Alcohol use: Not Currently    Drug use: Never    Sexual activity: Yes     Partners: Female   Other Topics Concern    Not on file   Social History Narrative    Not on file     Social Determinants of Health     Financial Resource Strain: [address] Difficulty of Paying Living Expenses: Not hard at all   Food Insecurity: No Food Insecurity    Worried About Running Out of Food in the Last Year: Never true    Ran Out of Food in the Last Year: Never true   Transportation Needs:     Lack of Transportation (Medical): Not on file    Lack of Transportation (Non-Medical):  Not on file   Physical Activity:     Days of Exercise per Week: Not on file    Minutes of Exercise per Session: Not on file   Stress:     Feeling of Stress : Not on file   Social Connections:     Frequency of Communication with Friends and Family: Not on file    Frequency of Social Gatherings with Friends and Family: Not on file    Attends Protestant Services: Not on file    Active Member of Clubs or Organizations: Not on file    Attends Club or Organization Meetings: Not on file    Marital Status: Not on file   Intimate Partner Violence:     Fear of Current or Ex-Partner: Not on file    Emotionally Abused: Not on file    Physically Abused: Not on file    Sexually Abused: Not on file   Housing Stability:     Unable to Pay for Housing in the Last Year: Not on file    Number of Places Lived in the Last Year: Not on file    Unstable Housing in the Last Year: Not on file       Medications:   Medications:    furosemide  60 mg IntraVENous Once    lactulose  30 g Oral Once    ketorolac  15 mg IntraVENous Once    DULoxetine  60 mg Oral Daily    lactulose  20 g Oral TID    [START ON 5/3/2022] levothyroxine  50 mcg Oral Daily    magnesium oxide  200 mg Oral BID    nadolol  40 mg Oral Daily    rifAXIMin  550 mg Oral BID    spironolactone  100 mg Oral Daily    furosemide  40 mg Oral BID    sodium chloride flush  5-40 mL IntraVENous 2 times per day    pantoprazole  40 mg IntraVENous QAM AC    [START ON 5/3/2022] enoxaparin  30 mg SubCUTAneous BID      Infusions:    sodium chloride       PRN Meds: albuterol sulfate HFA, 2 puff, Q4H PRN  sodium chloride flush, 10 mL, PRN  sodium chloride, , PRN  nicotine polacrilex, 2 mg, Q1H PRN  acetaminophen, 650 mg, Q6H PRN   Or  acetaminophen, 650 mg, Q6H PRN  ondansetron, 4 mg, Q6H PRN  ALPRAZolam, 0.5 mg, Daily PRN        Labs      CBC:   Recent Labs     05/02/22  1255   WBC 7.0   HGB 13.8        BMP:    Recent Labs     05/02/22  1255      K 3.8      CO2 28   BUN 20   CREATININE 1.1   GLUCOSE 80     Hepatic:   Recent Labs     05/02/22  1255   AST 39*   ALT 20   BILITOT 0.6   ALKPHOS 118     Lipids:   Lab Results   Component Value Date    CHOL 230 10/14/2019    HDL 62 10/14/2019    TRIG 135 10/14/2019     Hemoglobin A1C:   Lab Results   Component Value Date    LABA1C 6.1 08/09/2021     TSH:   Lab Results   Component Value Date    TSH 1.39 10/14/2019     Troponin:   Lab Results   Component Value Date    TROPONINT 0.022 05/02/2022    TROPONINT 0.012 02/11/2022    TROPONINT 0.026 09/14/2021     Lactic Acid: No results for input(s): LACTA in the last 72 hours.   BNP:   Recent Labs     05/02/22  1255   PROBNP 176.8     UA:  Lab Results   Component Value Date    NITRU NEGATIVE 02/11/2022    COLORU YELLOW 02/11/2022    WBCUA 1 02/11/2022    RBCUA NONE SEEN 02/11/2022    TRICHOMONAS NONE SEEN 09/13/2021    BACTERIA NEGATIVE 02/11/2022    CLARITYU CLEAR 02/11/2022    SPECGRAV 1.015 02/11/2022    LEUKOCYTESUR NEGATIVE 02/11/2022    UROBILINOGEN 4.0 02/11/2022    BILIRUBINUR NEGATIVE 02/11/2022    BLOODU NEGATIVE 02/11/2022    KETUA NEGATIVE 02/11/2022     Urine Cultures: No results found for: Ankitl Yasmint  Blood Cultures: No results found for: BC  No results found for: BLOODCULT2  Organism: No results found for: ORG    Imaging/Diagnostics Last 24 Hours   XR CHEST PORTABLE    Result Date: 5/2/2022  EXAMINATION: ONE XRAY VIEW OF THE CHEST 5/2/2022 1:29 pm COMPARISON: 02/11/2022 HISTORY: ORDERING SYSTEM PROVIDED HISTORY: SOB, known cirrhosis, decreased breath sounds R base; ?effusion TECHNOLOGIST PROVIDED HISTORY: Reason for exam:->SOB, known cirrhosis, decreased breath sounds R base; ?effusion Reason for Exam: SOB, known cirrhosis, decreased breath sounds R base; ?effusion Additional signs and symptoms: SOB, known cirrhosis, decreased breath sounds R base; ?effusion Relevant Medical/Surgical History: SOB, known cirrhosis, decreased breath sounds R base; ?effusion FINDINGS: Complete opacification of the right hemithorax likely related to a large right pleural effusion with severe atelectasis. Leftward shift of the mediastinal structures and trachea. The left lung is grossly clear. The cardiac silhouette is obscured. Complete opacification of the right hemithorax likely due to a large right pleural effusion and atelectasis.        Personally reviewed Lab Studies, Imaging, and discussed case with Dr. Katie Guerrero    Electronically signed by Amy Quintanilla CNP on 5/2/2022 at 5:13 PM

## 2022-05-02 NOTE — ED NOTES
1526 paged hospitalist     Yaima Salmon  05/02/22 1527  1552 Lizette Shi NP with [facility] returned call      Yaima Salmon  05/02/22 [id number]

## 2022-05-02 NOTE — TELEPHONE ENCOUNTER
Patient called in stating he's been having SOB for about 5 days says he's having trouble taking a deep breath. Denies any other symptoms, but says he's extremely tried since he can't breathe. He did say that he seen walk in on 4/25/2022 and was instructed to go to the hospital then but he didn't go. I spoke with Yennifer Roger since Dr. Benton Duty is out of the office I had her review Faizan Garcia note from 4/25/2022 and she said since he's so short of breathe it's urgent that he goes to the ED to be seen since there isn't much here we are able to do if he is filling back up with fluid which could cause him to stop breathing. I did instruct the patient to go the hospital and I did reassure him of how urgent the situation was.  Patient verbalized understanding and will go to the hospital.

## 2022-05-02 NOTE — ED PROVIDER NOTES
Triage Chief Complaint:   Shortness of Breath (reports difficulty breathing for the past 5-6 days and extremely tired)    IoneShannan Cedeno is a 78 y.o. male that presents with increasing shortness of breath the past week or so. Patient reports that he can no longer lay flat from his shortness of breath and he gets very winded with any exertion. Patient ports his symptoms are improved when he lays on his right side and worse when he lays on his left side as well. Some dry cough. No fevers. No chest pain. Patient does report a history of cirrhosis and follows with gastroenterology. Patient has had several paracenteses in the past.  Patient denies any abdominal pain at this time or increased swelling. Patient is still with good urine output. Patient has been adherent to his medications including his Lasix.         ROS:  General:  No fevers, no chills, no weakness  Eyes:  No recent vison changes, no discharge  ENT:  No sore throat, no nasal congestion, no hearing changes  Cardiovascular:  No chest pain, no palpitations  Respiratory:  + shortness of breath, + cough, no wheezing  Gastrointestinal:  No pain, no nausea, no vomiting, no diarrhea  Musculoskeletal:  No muscle pain, no joint pain  Skin:  No rash, no pruritis, no easy bruising  Neurologic:  No speech problems, no headache, no extremity numbness, no extremity tingling, no extremity weakness  Psychiatric:  No anxiety  Genitourinary:  No dysuria, no hematuria  Endocrine:  No unexpected weight gain, no unexpected weight loss  Extremities:  no edema, no pain    Past Medical History:   Diagnosis Date    Anxiety     Cirrhosis, alcoholic (HCC)     GERD (gastroesophageal reflux disease) 2000    GERD (gastroesophageal reflux disease)     Hyperlipidemia     Hypertension     Portal hypertension (HCC)     Pulmonary nodules     Sleep apnea     SOB (shortness of breath)      Past Surgical History:   Procedure Laterality Date    CHOLECYSTECTOMY  COLONOSCOPY      ENDOSCOPY, COLON, DIAGNOSTIC      FOOT SURGERY      needle removed,not sure which foot, per wife.  UPPER GASTROINTESTINAL ENDOSCOPY N/A 2022    EGD BIOPSY performed by Gume Valle MD at [address] [place] [address]       Family History   Problem Relation Age of Onset    Hypertension Brother     Diabetes Other      Social History     Socioeconomic History    Marital status:      Spouse name: Not on file    Number of children: Not on file    Years of education: Not on file    Highest education level: Not on file   Occupational History     Comment: Retired    Tobacco Use    Smoking status: Former Smoker     Packs/day: 1.00     Years: 14.00     Pack years: 14.00     Quit date:      Years since quittin.3    Smokeless tobacco: Never Used   Vaping Use    Vaping Use: Never used   Substance and Sexual Activity    Alcohol use: Not Currently    Drug use: Never    Sexual activity: Yes     Partners: Female   Other Topics Concern    Not on file   Social History Narrative    Not on file     Social Determinants of Health     Financial Resource Strain: Low Risk     Difficulty of Paying Living Expenses: Not hard at all   Food Insecurity: No Food Insecurity    Worried About [facility] in the Last Year: Never true    [facility] in the Last Year: Never true   Transportation Needs:     Lack of Transportation (Medical): Not on file    Lack of Transportation (Non-Medical):  Not on file   Physical Activity:     Days of Exercise per Week: Not on file    Minutes of Exercise per Session: Not on file   Stress:     Feeling of Stress : Not on file   Social Connections:     Frequency of Communication with Friends and Family: Not on file    Frequency of Social Gatherings with Friends and Family: Not on file    Attends Yarsanism Services: Not on file    Active Member of Clubs or Organizations: Not on file    Attends Club or Organization Meetings: Not on file    Marital Status: Not on file   Intimate Partner Violence:     Fear of Current or Ex-Partner: Not on file    Emotionally Abused: Not on file    Physically Abused: Not on file    Sexually Abused: Not on file   Housing Stability:     Unable to Pay for Housing in the Last Year: Not on file    Number of Ash in the Last Year: Not on file    Unstable Housing in the Last Year: Not on file     Current Facility-Administered Medications   Medication Dose Route Frequency Provider Last Rate Last Admin    furosemide (LASIX) injection 60 mg  60 mg IntraVENous Once Alejo Barrios MD        lactulose (CHRONULAC) 10 GM/15ML solution 30 g  30 g Oral Once Alejo Barrios MD        ketorolac (TORADOL) injection 15 mg  15 mg IntraVENous Once Alejo Barrios MD         Current Outpatient Medications   Medication Sig Dispense Refill    levothyroxine (SYNTHROID) 50 MCG tablet Take 1 tablet by mouth Daily 90 tablet 1    furosemide (LASIX) 40 MG tablet Take 1 tablet by mouth 2 times daily TAKE 1 tab am and pm 60 tablet 3    spironolactone (ALDACTONE) 100 MG tablet Take 1 tablet by mouth daily 90 tablet 3    lactulose (CHRONULAC) 10 GM/15ML solution Take 30 mLs by mouth 3 times daily Hold if having diarrhea 2700 mL 5    rifaximin (XIFAXAN) 550 MG tablet Take 1 tablet by mouth 2 times daily 60 tablet 3    DULoxetine (CYMBALTA) 60 MG extended release capsule TAKE 1 CAPSULE BY MOUTH ONCE DAILY 90 capsule 1    nadolol (CORGARD) 40 MG tablet Take 1 tablet by mouth daily 90 tablet 1    ferrous sulfate (IRON 325) 325 (65 Fe) MG tablet Take 1 tablet by mouth      zinc gluconate 50 MG tablet Take 50 mg by mouth 2 times daily      b complex vitamins capsule Take 1 capsule by mouth daily      albuterol sulfate HFA (VENTOLIN HFA) 108 (90 Base) MCG/ACT inhaler Inhale 2 puffs into the lungs 4 times daily as needed for Wheezing (Patient not taking: Reported on 3/11/2022) 1 Inhaler 0    tadalafil (CIALIS) 20 MG tablet Take 20 mg by mouth every 3 days       vitamin E 400 UNIT capsule Take 800 Units by mouth daily      magnesium oxide (MAG-OX) 400 MG tablet Take 200 mg by mouth 2 times daily OTC 1 qd  1    Multiple Vitamin (MULTI VITAMIN MENS PO) Take 1 tablet by mouth daily      ESOMEPRAZOLE MAGNESIUM PO Take 20 mg by mouth daily OTC      Omega-3 1000 MG CAPS Take 1 capsule by mouth daily       Allergies   Allergen Reactions    Lisinopril Swelling     Tongue swelling      Zetia [Ezetimibe] Swelling     Facial swelling    Atorvastatin     Codeine Other (See Comments)     Blood pressure drops    Hydrochlorothiazide     Hydroxyzine      Fatigue and depressed    Lexapro [Escitalopram Oxalate]      Increased depression    Pravastatin        Nursing Notes Reviewed    Physical Exam:  ED Triage Vitals [05/02/22 1152]   Enc Vitals Group      /67      Pulse 67      Resp 14      Temp 97.7 °F (36.5 °C)      Temp Source Oral      SpO2 91 %      Weight 285 lb (129.3 kg)      Height 5' 8\" (1.727 m)      Head Circumference       Peak Flow       Pain Score       Pain Loc       Pain Edu? Excl. in [address]? My pulse ox interpretation is - 84% on room air correcting to 100% on 2 L nasal cannula    General appearance:  No acute distress. Skin:  Warm. Dry. No diaphoresis. Eye:  Extraocular movements intact. Ears, nose, mouth and throat:  Oral mucosa moist   Neck:  Trachea midline. No meningismus. Extremity: There is trace and symmetric pretibial edema bilaterally. Normal ROM     Heart:  Regular rate and rhythm, normal S1 & S2, no extra heart sounds. Perfusion:  Intact. Strong symmetric bilateral radial and PT pulses. Respiratory: Globally diminished breath sounds throughout the right lung fields when compared to the left. Left side is with good air exchange. Speaking in full sentences. No respiratory distress. Abdominal:  Normal bowel sounds. Soft. Completely nontender. Hepatomegaly is.   Slightly Result Value Ref Range    Pro-.8 <300 PG/ML   Troponin   Result Value Ref Range    Troponin T 0.022 (H) <0.01 NG/ML   EKG 12 Lead   Result Value Ref Range    Ventricular Rate 68 BPM    Atrial Rate 68 BPM    P-R Interval 180 ms    QRS Duration 64 ms    Q-T Interval 422 ms    QTc Calculation (Bazett) 448 ms    P Axis 72 degrees    R Axis 16 degrees    T Axis 14 degrees    Diagnosis       Normal sinus rhythm  Low voltage QRS  Borderline ECG  When compared with ECG of 11-FEB-2022 08:19,  Minimal criteria for Anterior infarct are no longer present  Nonspecific T wave abnormality no longer evident in Anterior leads        Radiographs (if obtained):  [] The following radiograph was interpreted by myself in the absence of a radiologist:   [x] Radiologist's Report Reviewed:  XR CHEST PORTABLE   Final Result   Complete opacification of the right hemithorax likely due to a large right   pleural effusion and atelectasis. EKG (if obtained): (All EKG's are interpreted by myself in the absence of a cardiologist)  12 lead EKG per my interpretation:  Normal Sinus Rhythm at 68  Axis is   Normal  QTc is  within an acceptable range  There is no specific T wave changes appreciated. There is no specific ST wave changes appreciated.   No STEMI    Prior EKG to compare with was available and no clinically significant change in over morphology compared to prior        Chart review shows recent radiographs:  XR CHEST PORTABLE    Result Date: 5/2/2022  EXAMINATION: ONE XRAY VIEW OF THE CHEST 5/2/2022 1:29 pm COMPARISON: 02/11/2022 HISTORY: ORDERING SYSTEM PROVIDED HISTORY: SOB, known cirrhosis, decreased breath sounds R base; ?effusion TECHNOLOGIST PROVIDED HISTORY: Reason for exam:->SOB, known cirrhosis, decreased breath sounds R base; ?effusion Reason for Exam: SOB, known cirrhosis, decreased breath sounds R base; ?effusion Additional signs and symptoms: SOB, known cirrhosis, decreased breath sounds R base; ?effusion includes vital sign monitoring, pulse oximetry monitoring, telemetry monitoring, clinical response to the IV medications, reviewing the nursing notes, consultation time, dictation/documentation time, and interpretation of the lab work. This time excludes time spent performing procedures and separately billable procedures and family discussion time. Care of this patient occurred during the COVID-19 pandemic. Clinical Impression:  1. Acute on chronic respiratory failure with hypoxemia (HCC)    2. Pleural effusion    3. Alcoholic cirrhosis of liver with ascites ([facility] [address]      Disposition referral (if applicable):  No follow-up provider specified. Disposition medications (if applicable):  New Prescriptions    No medications on file       Comment: Please note this report has been produced using speech recognition software and may contain errors related to that system including errors in grammar, punctuation, and spelling, as well as words and phrases that may be inappropriate. If there are any questions or concerns please feel free to contact the dictating provider for clarification.        Martha Jones MD  05/02/22 4519

## 2022-05-02 NOTE — FLOWSHEET NOTE
Pt arrived to ED by wife with complaints of shortness of breath. Pt is hard of hearing and has verbally designated his wife at bedside to answer questions. Shortness of breath started 1 week ago and has progressively gotten worse. Wife states patient is unable to breath unless he is sitting up. Pt o2 was 84% upon arrival - o2 per nc at 2l/min was placed with positive effects - o2 100%.

## 2022-05-03 ENCOUNTER — APPOINTMENT (OUTPATIENT)
Dept: INTERVENTIONAL RADIOLOGY/VASCULAR | Age: 77
DRG: 432 | End: 2022-05-03
Payer: MEDICARE

## 2022-05-03 LAB
AMYLASE FLUID: 38 U/L
FLUID TYPE: NORMAL INDEX
GLUCOSE, FLUID: 104 MG/DL
LACTATE DEHYDROGENASE, FLUID: 88 IU/L
LYMPHOCYTES, BODY FLUID: 10 %
MONOCYTE, FLUID: 60 %
NEUTROPHIL, FLUID: 30 %
PROTEIN FLUID: 3.9 GM/DL
RBC FLUID: 991 /CU MM
WBC FLUID: 236 /CU MM

## 2022-05-03 PROCEDURE — 96372 THER/PROPH/DIAG INJ SC/IM: CPT

## 2022-05-03 PROCEDURE — 32555 ASPIRATE PLEURA W/ IMAGING: CPT

## 2022-05-03 PROCEDURE — 6360000002 HC RX W HCPCS: Performed by: NURSE PRACTITIONER

## 2022-05-03 PROCEDURE — C9113 INJ PANTOPRAZOLE SODIUM, VIA: HCPCS | Performed by: NURSE PRACTITIONER

## 2022-05-03 PROCEDURE — 80048 BASIC METABOLIC PNL TOTAL CA: CPT

## 2022-05-03 PROCEDURE — 6370000000 HC RX 637 (ALT 250 FOR IP): Performed by: NURSE PRACTITIONER

## 2022-05-03 PROCEDURE — G0378 HOSPITAL OBSERVATION PER HR: HCPCS

## 2022-05-03 PROCEDURE — 2700000000 HC OXYGEN THERAPY PER DAY

## 2022-05-03 PROCEDURE — 96375 TX/PRO/DX INJ NEW DRUG ADDON: CPT

## 2022-05-03 PROCEDURE — 2580000003 HC RX 258: Performed by: NURSE PRACTITIONER

## 2022-05-03 RX ADMIN — MAGNESIUM OXIDE 400 MG (241.3 MG MAGNESIUM) TABLET 200 MG: TABLET at 08:34

## 2022-05-03 RX ADMIN — MAGNESIUM OXIDE 400 MG (241.3 MG MAGNESIUM) TABLET 200 MG: TABLET at 21:33

## 2022-05-03 RX ADMIN — RIFAXIMIN 550 MG: 550 TABLET ORAL at 08:34

## 2022-05-03 RX ADMIN — PANTOPRAZOLE SODIUM 40 MG: 40 INJECTION, POWDER, FOR SOLUTION INTRAVENOUS at 05:59

## 2022-05-03 RX ADMIN — SODIUM CHLORIDE, PRESERVATIVE FREE 10 ML: 5 INJECTION INTRAVENOUS at 08:35

## 2022-05-03 RX ADMIN — LACTULOSE 20 G: 10 SOLUTION ORAL at 05:58

## 2022-05-03 RX ADMIN — FUROSEMIDE 40 MG: 20 TABLET ORAL at 17:18

## 2022-05-03 RX ADMIN — LEVOTHYROXINE SODIUM 50 MCG: 25 TABLET ORAL at 05:59

## 2022-05-03 RX ADMIN — SPIRONOLACTONE 100 MG: 50 TABLET ORAL at 08:34

## 2022-05-03 RX ADMIN — RIFAXIMIN 550 MG: 550 TABLET ORAL at 21:33

## 2022-05-03 RX ADMIN — ENOXAPARIN SODIUM 30 MG: 100 INJECTION SUBCUTANEOUS at 21:34

## 2022-05-03 RX ADMIN — ALPRAZOLAM 0.5 MG: 0.25 TABLET ORAL at 12:55

## 2022-05-03 RX ADMIN — NADOLOL 40 MG: 20 TABLET ORAL at 08:35

## 2022-05-03 RX ADMIN — LACTULOSE 20 G: 10 SOLUTION ORAL at 21:50

## 2022-05-03 RX ADMIN — FUROSEMIDE 40 MG: 20 TABLET ORAL at 08:34

## 2022-05-03 RX ADMIN — DULOXETINE 60 MG: 30 CAPSULE, DELAYED RELEASE ORAL at 21:32

## 2022-05-03 RX ADMIN — LACTULOSE 20 G: 10 SOLUTION ORAL at 12:34

## 2022-05-03 ASSESSMENT — PAIN SCALES - GENERAL
PAINLEVEL_OUTOF10: 3
PAINLEVEL_OUTOF10: 0

## 2022-05-03 ASSESSMENT — PAIN DESCRIPTION - LOCATION: LOCATION: BACK

## 2022-05-03 ASSESSMENT — PAIN DESCRIPTION - FREQUENCY: FREQUENCY: CONTINUOUS

## 2022-05-03 ASSESSMENT — PAIN DESCRIPTION - DESCRIPTORS: DESCRIPTORS: ACHING

## 2022-05-03 ASSESSMENT — PAIN DESCRIPTION - PAIN TYPE: TYPE: CHRONIC PAIN

## 2022-05-03 NOTE — PROGRESS NOTES
Received patient for CTA Chest Abdomen Pelvis w/ contrast.     Dr. Elizabeth Goldberg advised holding off on scan until after procedure tomorrow. Scan on hold. Patient transferred to Room 2018.

## 2022-05-03 NOTE — CARE COORDINATION
CM spoke with the patient on the room phone to initiate discharge planning. Patient lives at home with his wife, has insurance with Rx coverage & PCP, and stated that he is independent with ADL's. Patient stated that he does not require the use of any assistive devices or home oxygen but does use an inhaler at home. Patient plans to return home upon discharge and is unable to identify any needs at this time. CM available if needs arise.

## 2022-05-03 NOTE — PROGRESS NOTES
V2.0  [facility] Hospitalist Progress Note      Name:  Severa Living /Age/Sex: 1945  (78 y.o. male)   MRN & CSN:  [id number] & [id number] Encounter Date/Time: 5/3/2022 11:00 AM EDT    Location:  -A PCP: Dima Pearson, [address] Day: 2    Assessment and Plan:   Severa Living is a 78 y.o. male       Acute respiratory failure  Pleural effusion  -Worsening shortness of breath for 5 days prior to arrival  -X-ray showed complete opacitifiacation of the right hemithorax likely due to a large right pleural effusion  -Continue home diuretics  -Pulmonary consult    Status post thoracentesis on May 3-2200 mL yellow cloudy fluid [id number] cultures    Rhinovirus Enterovirus PCR positive     Alcoholic cirrhosis with ascites  Portal hypertension  -Slightly elevated ammonia level, patient is alert and oriented  -Continue diuretics for ascites  -Continue Nadolol for portal hypertension  -Continue rifaximin and lactulose to prevent encephalopathy     Hypothyroidism  -Continue Synthroid 50 MCG     DVT prophylaxis  -Lovenox, start after thoracentesis    Diet ADULT DIET; Regular; Low Sodium (2 gm)   DVT Prophylaxis [x] Lovenox, []  Heparin, [] SCDs, [] Ambulation,  [] Eliquis, [] Xarelto  [] Coumadin   Code Status Full Code   Disposition From: Home  Expected Disposition: Home  Estimated Date of Discharge: About 3 days  Patient requires continued admission due to needs pulmonary consult, more short of breath, follow-up results of pleural studies   Surrogate Decision Maker/ POA  wife is listed as an alternate contact     Subjective:     Chief Complaint: Shortness of Breath (reports difficulty breathing for the past 5-6 days and extremely tired)       Severa Living is a 78 y.o. male     He was more short of breath today      Review of Systems:    Review of Systems    No headache or bleeding reported    Objective:        Intake/Output Summary (Last 24 hours) at 5/3/2022 1100  Last data filed at 5/3/2022 0605  Gross per 24 hour   Intake 250 ml   Output 4400 ml   Net -4150 ml        Vitals:   Vitals:    05/03/22 0900   BP: 126/69   Pulse: 64   Resp: 18   Temp: 98 °F (36.7 °C)   SpO2:        Physical Exam:     Physical Exam  Eyes:      General:         Right eye: No discharge. Left eye: No discharge. Pulmonary:      Effort: Pulmonary effort is normal.   Skin:     Coloration: Skin is not jaundiced. Neurological:      Mental Status: He is alert. Cranial Nerves: No cranial nerve deficit.          Medications:   Medications:    DULoxetine  60 mg Oral Daily    lactulose  20 g Oral TID    levothyroxine  50 mcg Oral Daily    magnesium oxide  200 mg Oral BID    nadolol  40 mg Oral Daily    rifAXIMin  550 mg Oral BID    spironolactone  100 mg Oral Daily    furosemide  40 mg Oral BID    sodium chloride flush  5-40 mL IntraVENous 2 times per day    pantoprazole  40 mg IntraVENous QAM AC    enoxaparin  30 mg SubCUTAneous BID      Infusions:    sodium chloride       PRN Meds: albuterol sulfate HFA, 2 puff, Q4H PRN  sodium chloride flush, 10 mL, PRN  sodium chloride, , PRN  nicotine polacrilex, 2 mg, Q1H PRN  acetaminophen, 650 mg, Q6H PRN   Or  acetaminophen, 650 mg, Q6H PRN  ondansetron, 4 mg, Q6H PRN  ALPRAZolam, 0.5 mg, Daily PRN        Labs      Recent Results (from the past 24 hour(s))   EKG 12 Lead    Collection Time: 05/02/22 12:02 PM   Result Value Ref Range    Ventricular Rate 68 BPM    Atrial Rate 68 BPM    P-R Interval 180 ms    QRS Duration 64 ms    Q-T Interval 422 ms    QTc Calculation (Bazett) 448 ms    P Axis 72 degrees    R Axis 16 degrees    T Axis 14 degrees    Diagnosis       Normal sinus rhythm  Low voltage QRS  Borderline ECG  When compared with ECG of 11-FEB-2022 08:19,  Minimal criteria for Anterior infarct are no longer present  Nonspecific T wave abnormality no longer evident in Anterior leads     CBC with Auto Differential    Collection Time: 05/02/22 12:55 PM Result Value Ref Range    WBC 7.0 4.0 - 10.5 K/CU MM    RBC 4.31 (L) 4.6 - 6.2 M/CU MM    Hemoglobin 13.8 13.5 - 18.0 GM/DL    Hematocrit 43.4 42 - 52 %    .7 (H) 78 - 100 FL    MCH 32.0 (H) 27 - 31 PG    MCHC 31.8 (L) 32.0 - 36.0 %    RDW 14.8 11.7 - 14.9 %    Platelets 952 166 - 178 K/CU MM    MPV 10.6 7.5 - 11.1 FL    Differential Type AUTOMATED DIFFERENTIAL     Segs Relative 65.3 36 - 66 %    Lymphocytes % 15.0 (L) 24 - 44 %    Monocytes % 12.0 (H) 0 - 4 %    Eosinophils % 6.4 (H) 0 - 3 %    Basophils % 1.0 0 - 1 %    Segs Absolute 4.6 K/CU MM    Lymphocytes Absolute 1.1 K/CU MM    Monocytes Absolute 0.8 K/CU MM    Eosinophils Absolute 0.5 K/CU MM    Basophils Absolute 0.1 K/CU MM    Nucleated RBC % 0.0 %    Total Nucleated RBC 0.0 K/CU MM    Total Immature Neutrophil 0.02 K/CU MM    Immature Neutrophil % 0.3 0 - 0.43 %   Comprehensive Metabolic Panel    Collection Time: 05/02/22 12:55 PM   Result Value Ref Range    Sodium 140 135 - 145 MMOL/L    Potassium 3.8 3.5 - 5.1 MMOL/L    Chloride 103 99 - 110 mMol/L    CO2 28 21 - 32 MMOL/L    BUN 20 6 - 23 MG/DL    CREATININE 1.1 0.9 - 1.3 MG/DL    Glucose 80 70 - 99 MG/DL    Calcium 9.6 8.3 - 10.6 MG/DL    Albumin 3.3 (L) 3.4 - 5.0 GM/DL    Total Protein 8.4 (H) 6.4 - 8.2 GM/DL    Total Bilirubin 0.6 0.0 - 1.0 MG/DL    ALT 20 10 - 40 U/L    AST 39 (H) 15 - 37 IU/L    Alkaline Phosphatase 118 40 - 129 IU/L    GFR Non-African American >60 >60 mL/min/1.73m2    GFR African American >60 >60 mL/min/1.73m2    Anion Gap 9 4 - 16   Brain Natriuretic Peptide    Collection Time: 05/02/22 12:55 PM   Result Value Ref Range    Pro-.8 <300 PG/ML   Troponin    Collection Time: 05/02/22 12:55 PM   Result Value Ref Range    Troponin T 0.022 (H) <0.01 NG/ML   Blood Gas, Venous    Collection Time: 05/02/22  1:30 PM   Result Value Ref Range    pH, Jamaal 7.34 7.32 - 7.42    pCO2, Jamaal 63 (H) 38 - 52 mmHG    pO2, Jamaal 75 (H) 28 - 48 mmHG    Base Exc, Mixed 6.1 (H) 0 - 1.2 HCO3, Venous 34.0 (H) 19 - 25 MMOL/L    O2 Sat, Jamaal 92.1 (H) 50 - 70 %   Ammonia    Collection Time: 05/02/22  2:40 PM   Result Value Ref Range    Ammonia 69 (H) 16 - 60 UMOL/L   Respiratory Panel, Molecular, with COVID-19 (Restricted: peds pts or suitable admitted adults)    Collection Time: 05/02/22  2:53 PM    Specimen: Nasopharyngeal   Result Value Ref Range    Adenovirus Detection by PCR NOT DETECTED NOT DETECTED    Coronavirus 229E PCR NOT DETECTED NOT DETECTED    Coronavirus HKU1 PCR NOT DETECTED NOT DETECTED    Coronavirus NL63 PCR NOT DETECTED NOT DETECTED    Coronavirus OC43 PCR NOT DETECTED NOT DETECTED    SARS-CoV-2 NOT DETECTED NOT DETECTED    Human Metapneumovirus PCR NOT DETECTED NOT DETECTED    Rhinovirus Enterovirus PCR DETECTED BY PCR (A) NOT DETECTED    Influenza A by PCR NOT DETECTED NOT DETECTED    Influenza A H1 Pandemic PCR NOT DETECTED NOT DETECTED    Influenza A H1 (2009) PCR NOT DETECTED NOT DETECTED    Influenza A H3 PCR NOT DETECTED NOT DETECTED    Influenza B by PCR NOT DETECTED NOT DETECTED    Parainfluenza 1 PCR NOT DETECTED NOT DETECTED    Parainfluenza 2 PCR NOT DETECTED NOT DETECTED    Parainfluenza 3 PCR NOT DETECTED NOT DETECTED    Parainfluenza 4 PCR NOT DETECTED NOT DETECTED    RSV PCR NOT DETECTED NOT DETECTED    Bordetella parapertussis by PCR NOT DETECTED NOT DETECTED    B Pertussis by PCR NOT DETECTED NOT DETECTED    Chlamydophila Pneumonia PCR NOT DETECTED NOT DETECTED    Mycoplasma pneumo by PCR NOT DETECTED NOT DETECTED   Protime/INR & PTT    Collection Time: 05/02/22  3:42 PM   Result Value Ref Range    Protime 14.2 11.7 - 14.5 SECONDS    INR 1.10 INDEX    aPTT 35.1 25.1 - 37.1 SECONDS   Culture, Body Fluid    Collection Time: 05/02/22 11:00 PM    Specimen: Pleural Fluid;  Body Fluid   Result Value Ref Range    Specimen PLEURAL FLUID     Special Requests NONE    Protein, body fluid    Collection Time: 05/02/22 11:00 PM   Result Value Ref Range    Protein, Fluid 3.9 GM/DL Lactate dehydrogenase, body fluid    Collection Time: 05/02/22 11:00 PM   Result Value Ref Range    LD, Fluid 88 IU/L   Amylase, body fluid    Collection Time: 05/02/22 11:00 PM   Result Value Ref Range    Amylase, Fluid 38 U/L   Albumin, Body Fluid    Collection Time: 05/02/22 11:00 PM   Result Value Ref Range    Albumin, Fluid 1.2 GM/DL   Glucose, body fluid    Collection Time: 05/02/22 11:00 PM   Result Value Ref Range    Glucose, Fluid 104 MG/DL        Imaging/Diagnostics Last 24 Hours   XR CHEST PORTABLE    Result Date: 5/3/2022  EXAMINATION: ONE XRAY VIEW OF THE CHEST 5/2/2022 9:29 pm COMPARISON: May 2, 2022 HISTORY: ORDERING SYSTEM PROVIDED HISTORY: post right thoracentesis TECHNOLOGIST PROVIDED HISTORY: Reason for exam:->post right thoracentesis Reason for Exam: post right thoracentesis Additional signs and symptoms: NA Relevant Medical/Surgical History: hypertension FINDINGS: Status post right thoracentesis with interval improvement of the right pleural effusion. A small amount of aerated lung is seen. A large pleural effusion persists. No obvious pneumothorax. The left lung demonstrates a small pleural effusion. No acute or aggressive osseous lesion. 1. Status post right thoracentesis with new partial aeration of the right lung. Large pleural effusion persists. 2. Small left pleural effusion.      XR CHEST PORTABLE    Result Date: 5/2/2022  EXAMINATION: ONE XRAY VIEW OF THE CHEST 5/2/2022 1:29 pm COMPARISON: 02/11/2022 HISTORY: ORDERING SYSTEM PROVIDED HISTORY: SOB, known cirrhosis, decreased breath sounds R base; ?effusion TECHNOLOGIST PROVIDED HISTORY: Reason for exam:->SOB, known cirrhosis, decreased breath sounds R base; ?effusion Reason for Exam: SOB, known cirrhosis, decreased breath sounds R base; ?effusion Additional signs and symptoms: SOB, known cirrhosis, decreased breath sounds R base; ?effusion Relevant Medical/Surgical History: SOB, known cirrhosis, decreased breath sounds R base; ?effusion FINDINGS: Complete opacification of the right hemithorax likely related to a large right pleural effusion with severe atelectasis. Leftward shift of the mediastinal structures and trachea. The left lung is grossly clear. The cardiac silhouette is obscured. Complete opacification of the right hemithorax likely due to a large right pleural effusion and atelectasis.        Electronically signed by Deanna Everett MD on 5/3/2022 at 11:00 AM

## 2022-05-03 NOTE — PROGRESS NOTES
PROCEDURE PERFORMED: RIGHT THORACENTESIS    PRIMARY INDICATION FOR PROCEDURE: RIGHT PLEURAL EFFUSION    INFORMED CONSENT:  Obtained prior to procedure. Consent placed in chart. JOVITA SCORE PRE PROCEDURE: 10       2018                 ASSESSMENT: SOB, ASCITES    BARRIER PRECAUTIONS & STERILE TECHNIQUE:               Pt  positioned for comfort. Warm blankets given. Pt placed on Cardiac Monitor. Pt and draped in a sterile fashion with chlorhexadine.     PAIN/LOCAL ANESTHESIA/SEDATION MANAGEMENT:           Local: Lidocaine 1% given by Dr Giuseppe Palma          Sedation:              Fentanyl:             Versed:     INTRAOPERATIVE:           ACCESS TIME: 2101          US IMAGES: 5          ONE STEP           FINAL IMAGE TAKEN TO CONFIRM PLACEMENT OF: CATHETER          CONTRAST/CC:     STERILE DRESSINGS: APPLIED BY DR Shelli Hickman    SPECIMENS: 2200 cloudy yellow pleural fluid removed and sent to the lab    EBL:  <<1cc        FOLLOW- UP X-RAY: yes    COMPLICATIONS:na    JOVITA SCORE POST PROCEDURE:  10        REPORT CALLED TO: carlie martinez

## 2022-05-03 NOTE — PROGRESS NOTES
Arrived on unit via bed accompanied by IR MD who performed immediate thoracentesis.  Wife at bedside

## 2022-05-04 PROBLEM — J96.00 ACUTE RESPIRATORY FAILURE (HCC): Status: ACTIVE | Noted: 2022-05-04

## 2022-05-04 LAB
ANION GAP SERPL CALCULATED.3IONS-SCNC: 8 MMOL/L (ref 4–16)
BUN BLDV-MCNC: 18 MG/DL (ref 6–23)
CALCIUM SERPL-MCNC: 9.1 MG/DL (ref 8.3–10.6)
CHLORIDE BLD-SCNC: 101 MMOL/L (ref 99–110)
CO2: 31 MMOL/L (ref 21–32)
CREAT SERPL-MCNC: 1.1 MG/DL (ref 0.9–1.3)
GFR AFRICAN AMERICAN: >60 ML/MIN/1.73M2
GFR NON-AFRICAN AMERICAN: >60 ML/MIN/1.73M2
GLUCOSE BLD-MCNC: 113 MG/DL (ref 70–99)
HCT VFR BLD CALC: 41.3 % (ref 42–52)
HEMOGLOBIN: 13 GM/DL (ref 13.5–18)
INR BLD: 1.13 INDEX
MCH RBC QN AUTO: 32.1 PG (ref 27–31)
MCHC RBC AUTO-ENTMCNC: 31.5 % (ref 32–36)
MCV RBC AUTO: 102 FL (ref 78–100)
PDW BLD-RTO: 14.5 % (ref 11.7–14.9)
PLATELET # BLD: 199 K/CU MM (ref 140–440)
PMV BLD AUTO: 10.6 FL (ref 7.5–11.1)
POTASSIUM SERPL-SCNC: 4.1 MMOL/L (ref 3.5–5.1)
PROTHROMBIN TIME: 14.6 SECONDS (ref 11.7–14.5)
RBC # BLD: 4.05 M/CU MM (ref 4.6–6.2)
SODIUM BLD-SCNC: 140 MMOL/L (ref 135–145)
WBC # BLD: 7.6 K/CU MM (ref 4–10.5)

## 2022-05-04 PROCEDURE — 80048 BASIC METABOLIC PNL TOTAL CA: CPT

## 2022-05-04 PROCEDURE — 96372 THER/PROPH/DIAG INJ SC/IM: CPT

## 2022-05-04 PROCEDURE — 2580000003 HC RX 258: Performed by: INTERNAL MEDICINE

## 2022-05-04 PROCEDURE — 99222 1ST HOSP IP/OBS MODERATE 55: CPT | Performed by: INTERNAL MEDICINE

## 2022-05-04 PROCEDURE — G0378 HOSPITAL OBSERVATION PER HR: HCPCS

## 2022-05-04 PROCEDURE — 85027 COMPLETE CBC AUTOMATED: CPT

## 2022-05-04 PROCEDURE — C9113 INJ PANTOPRAZOLE SODIUM, VIA: HCPCS | Performed by: NURSE PRACTITIONER

## 2022-05-04 PROCEDURE — 94664 DEMO&/EVAL PT USE INHALER: CPT

## 2022-05-04 PROCEDURE — 87205 SMEAR GRAM STAIN: CPT

## 2022-05-04 PROCEDURE — 6360000002 HC RX W HCPCS: Performed by: NURSE PRACTITIONER

## 2022-05-04 PROCEDURE — 36415 COLL VENOUS BLD VENIPUNCTURE: CPT

## 2022-05-04 PROCEDURE — 2580000003 HC RX 258: Performed by: NURSE PRACTITIONER

## 2022-05-04 PROCEDURE — 87070 CULTURE OTHR SPECIMN AEROBIC: CPT

## 2022-05-04 PROCEDURE — 6370000000 HC RX 637 (ALT 250 FOR IP): Performed by: NURSE PRACTITIONER

## 2022-05-04 PROCEDURE — 94761 N-INVAS EAR/PLS OXIMETRY MLT: CPT

## 2022-05-04 PROCEDURE — 84157 ASSAY OF PROTEIN OTHER: CPT

## 2022-05-04 PROCEDURE — 1200000000 HC SEMI PRIVATE

## 2022-05-04 PROCEDURE — 99222 1ST HOSP IP/OBS MODERATE 55: CPT | Performed by: SPECIALIST

## 2022-05-04 PROCEDURE — 85610 PROTHROMBIN TIME: CPT

## 2022-05-04 PROCEDURE — 96375 TX/PRO/DX INJ NEW DRUG ADDON: CPT

## 2022-05-04 RX ORDER — SODIUM CHLORIDE 0.9 % (FLUSH) 0.9 %
5-40 SYRINGE (ML) INJECTION EVERY 12 HOURS SCHEDULED
Status: DISCONTINUED | OUTPATIENT
Start: 2022-05-04 | End: 2022-05-10 | Stop reason: SDUPTHER

## 2022-05-04 RX ORDER — SODIUM CHLORIDE 0.9 % (FLUSH) 0.9 %
5-40 SYRINGE (ML) INJECTION PRN
Status: DISCONTINUED | OUTPATIENT
Start: 2022-05-04 | End: 2022-05-18

## 2022-05-04 RX ORDER — SODIUM CHLORIDE 9 MG/ML
INJECTION, SOLUTION INTRAVENOUS PRN
Status: DISCONTINUED | OUTPATIENT
Start: 2022-05-04 | End: 2022-05-18 | Stop reason: HOSPADM

## 2022-05-04 RX ADMIN — FUROSEMIDE 40 MG: 20 TABLET ORAL at 09:19

## 2022-05-04 RX ADMIN — LACTULOSE 20 G: 10 SOLUTION ORAL at 14:49

## 2022-05-04 RX ADMIN — SPIRONOLACTONE 100 MG: 50 TABLET ORAL at 09:19

## 2022-05-04 RX ADMIN — NADOLOL 40 MG: 20 TABLET ORAL at 09:18

## 2022-05-04 RX ADMIN — FUROSEMIDE 40 MG: 20 TABLET ORAL at 17:36

## 2022-05-04 RX ADMIN — SODIUM CHLORIDE, PRESERVATIVE FREE 10 ML: 5 INJECTION INTRAVENOUS at 02:19

## 2022-05-04 RX ADMIN — LEVOTHYROXINE SODIUM 50 MCG: 25 TABLET ORAL at 05:38

## 2022-05-04 RX ADMIN — DULOXETINE 60 MG: 30 CAPSULE, DELAYED RELEASE ORAL at 21:02

## 2022-05-04 RX ADMIN — RIFAXIMIN 550 MG: 550 TABLET ORAL at 09:19

## 2022-05-04 RX ADMIN — RIFAXIMIN 550 MG: 550 TABLET ORAL at 21:02

## 2022-05-04 RX ADMIN — SODIUM CHLORIDE, PRESERVATIVE FREE 10 ML: 5 INJECTION INTRAVENOUS at 21:04

## 2022-05-04 RX ADMIN — MAGNESIUM OXIDE 400 MG (241.3 MG MAGNESIUM) TABLET 200 MG: TABLET at 09:19

## 2022-05-04 RX ADMIN — MAGNESIUM OXIDE 400 MG (241.3 MG MAGNESIUM) TABLET 200 MG: TABLET at 21:02

## 2022-05-04 RX ADMIN — PANTOPRAZOLE SODIUM 40 MG: 40 INJECTION, POWDER, FOR SOLUTION INTRAVENOUS at 07:42

## 2022-05-04 RX ADMIN — ENOXAPARIN SODIUM 30 MG: 100 INJECTION SUBCUTANEOUS at 09:19

## 2022-05-04 RX ADMIN — LACTULOSE 20 G: 10 SOLUTION ORAL at 05:38

## 2022-05-04 RX ADMIN — LACTULOSE 20 G: 10 SOLUTION ORAL at 21:03

## 2022-05-04 RX ADMIN — SODIUM CHLORIDE, PRESERVATIVE FREE 10 ML: 5 INJECTION INTRAVENOUS at 09:20

## 2022-05-04 NOTE — PROGRESS NOTES
Pt received Lovenox this am. Lovenox is 24 hour hold for IR procedures. Held medication on MAR. Will do paracentesis tomorrow.  Notified Stefan Villegas RN on 2east.

## 2022-05-04 NOTE — CONSULTS
Pulmonary Consult Note      Reason for Consult: Pleural effusion  Requesting Physician:  Sienna Ordonez MD  Subjective:   CHIEF COMPLAINT :SOB    Patient Active Problem List    Diagnosis Date Noted    Pleural effusion 05/02/2022     Priority: Medium    Sepsis ([address] 03/19/2022    Hepatic encephalopathy ([address] 03/19/2022    Hearing loss 03/11/2022    Cirrhosis of liver with ascites ([address]     Secondary esophageal varices without bleeding (HCC)     Gastric polyps     SOB (shortness of breath) 09/13/2021    Portal hypertension ([address] 08/30/2021    JENNIFER (obstructive sleep apnea) 08/09/2021    Bilateral hearing loss 05/07/2021    Increased ammonia level 05/07/2021    Class 3 severe obesity with body mass index (BMI) of 40.0 to 44.9 in adult [facility]) 09/21/2020    Hyperglycemia 12/04/2019    Acquired hypothyroidism 12/04/2019    Pulmonary nodules 67/94/8062    Alcoholic cirrhosis of liver with ascites (HCC)-Dr Jacques 08/08/2019    Mixed hyperlipidemia 08/08/2019    Hypertension 08/08/2019    History of alcohol abuse 08/08/2019    Gastroesophageal reflux disease without esophagitis 08/08/2019    Anxiety 08/08/2019    Shortness of breath 05/26/2019    History of colonic polyps 01/15/2019        HPI:                The patient is a 78 y.o. male with significant past medical history of hypertension, alcoholic cirrhosis with recurrent ascites and portal hypertension, hypothyroidism   presents with complaints of SOB x 5 days. His CXR showed large right pleural effusion that has been drained. He has now recurrent right pleural effusion today with ascites. He is on the diuresis and B blocker. At this time he is lying in the bed. He is in mild resp distress.     Past Medical History:      Diagnosis Date    Anxiety     Cirrhosis, alcoholic ([address]     GERD (gastroesophageal reflux disease) 2000    GERD (gastroesophageal reflux disease)     Hyperlipidemia     Hypertension     Portal hypertension ([address]     Pulmonary nodules     Sleep apnea     SOB (shortness of breath)       Past Surgical History:        Procedure Laterality Date    CHOLECYSTECTOMY      COLONOSCOPY      ENDOSCOPY, COLON, DIAGNOSTIC      FOOT SURGERY      needle removed,not sure which foot, per wife.  UPPER GASTROINTESTINAL ENDOSCOPY N/A 1/19/2022    EGD BIOPSY performed by Bertrand Meigs, MD at [address] [place] [address]       Current Medications:     sodium chloride flush  5-40 mL IntraVENous 2 times per day    DULoxetine  60 mg Oral Daily    lactulose  20 g Oral TID    levothyroxine  50 mcg Oral Daily    magnesium oxide  200 mg Oral BID    nadolol  40 mg Oral Daily    rifAXIMin  550 mg Oral BID    spironolactone  100 mg Oral Daily    furosemide  40 mg Oral BID    sodium chloride flush  5-40 mL IntraVENous 2 times per day    pantoprazole  40 mg IntraVENous QAM AC    enoxaparin  30 mg SubCUTAneous BID     Allergies:    Social History:    TOBACCO:   reports that he quit smoking about 46 years ago. He has a 14.00 pack-year smoking history. He has never used smokeless tobacco.  ETOH:   reports previous alcohol use. Patient currently lives independently    Family History:       Problem Relation Age of Onset    Hypertension Brother     Diabetes Other        REVIEW OF SYSTEMS:    CONSTITUTIONAL:  negative for fevers, chills, diaphoresis, activity change, appetite change, fatigue, night sweats and unexpected weight change.    EYES:  negative for blurred vision, eye discharge, visual disturbance and icterus  HEENT:  negative for hearing loss, tinnitus, ear drainage, sinus pressure, nasal congestion, epistaxis and snoring  RESPIRATORY:  See HPI  CARDIOVASCULAR:  negative for chest pain, palpitations, exertional chest pressure/discomfort, edema, syncope  GASTROINTESTINAL:  negative for nausea, vomiting, diarrhea, constipation, blood in stool and abdominal pain  GENITOURINARY:  negative for frequency, dysuria, urinary incontinence, decreased urine volume, and hematuria  HEMATOLOGIC/LYMPHATIC:  negative for easy bruising, bleeding and lymphadenopathy  ALLERGIC/IMMUNOLOGIC:  negative for recurrent infections, angioedema, anaphylaxis and drug reactions  ENDOCRINE:  negative for weight changes and diabetic symptoms including polyuria, polydipsia and polyphagia  MUSCULOSKELETAL:  negative for  pain, joint swelling, decreased range of motion and muscle weakness  NEUROLOGICAL:  negative for headaches, slurred speech, unilateral weakness  PSYCHIATRIC/BEHAVIORAL: negative for hallucinations, behavioral problems, confusion and agitation. Objective:   PHYSICAL EXAM:      VITALS:  BP (!) 140/71   Pulse 93   Temp 97.6 °F (36.4 °C) (Oral)   Resp 16   Ht 5' 8\" (1.727 m)   Wt 285 lb (129.3 kg)   SpO2 100%   BMI 43.33 kg/m²   24HR INTAKE/OUTPUT:      Intake/Output Summary (Last 24 hours) at 2022 1130  Last data filed at 2022 6993  Gross per 24 hour   Intake 250 ml   Output --   Net 250 ml     CURRENT PULSE OXIMETRY:  SpO2: 100 %  24HR PULSE OXIMETRY RANGE:  SpO2  Av.2 %  Min: 96 %  Max: 100 %    CONSTITUTIONAL:  awake, alert, cooperative, no apparent distress, and appears stated age  NECK:  Supple, symmetrical, trachea midline, no adenopathy, thyroid symmetric, not enlarged and no tenderness, skin normal  LUNGS: Decreased air entry bilateral bases   CARDIOVASCULAR:  normal S1 and S2, no edema and no JVD  ABDOMEN:  normal bowel sounds, non-distended and no masses palpated, and no tenderness to palpation. No hepatospleenomegaly  LYMPHADENOPATHY:  no axillary or supraclavicular adenopathy. No cervical adnenopathy  PSYCHIATRIC: Oriented to person place and time. No obvious depression or anxiety. MUSCULOSKELETAL: No obvious misalignment or effusion of the joints. No clubbing, cyanosis of the digits. SKIN:  normal skin color, texture, turgor and no redness, warmth, or swelling.  No palpable nodules    DATA:    Old records have been reviewed  CBC with Differential:    Lab Results   Component Value Date    WBC 7.0 05/02/2022    RBC 4.31 05/02/2022    HGB 13.8 05/02/2022    HCT 43.4 05/02/2022     05/02/2022    .7 05/02/2022    MCH 32.0 05/02/2022    MCHC 31.8 05/02/2022    RDW 14.8 05/02/2022    SEGSPCT 65.3 05/02/2022    LYMPHOPCT 15.0 05/02/2022    MONOPCT 12.0 05/02/2022    BASOPCT 1.0 05/02/2022    MONOSABS 0.8 05/02/2022    LYMPHSABS 1.1 05/02/2022    EOSABS 0.5 05/02/2022    BASOSABS 0.1 05/02/2022    DIFFTYPE AUTOMATED DIFFERENTIAL 05/02/2022     BMP:    Lab Results   Component Value Date     05/04/2022    K 4.1 05/04/2022     05/04/2022    CO2 31 05/04/2022    BUN 18 05/04/2022    CREATININE 1.1 05/04/2022    CALCIUM 9.1 05/04/2022    GFRAA >60 05/04/2022    LABGLOM >60 05/04/2022    GLUCOSE 113 05/04/2022     Hepatic Function Panel:    Lab Results   Component Value Date    ALKPHOS 118 05/02/2022    ALT 20 05/02/2022    AST 39 05/02/2022    PROT 8.4 05/02/2022    BILITOT 0.6 05/02/2022    BILIDIR 0.2 05/28/2019    IBILI 0.4 05/28/2019     ABG:  No results found for: SUQ2APQ, BEART, P7NGKQEN, PHART, THGBART, KNV2YCS, PO2ART, KKI4UVN    Cultures:   Pending        Radiology Review:      . Status post right thoracentesis with new partial aeration of the right   lung.  Large pleural effusion persists.    2. Small left pleural effusion           Assessment/Plan       Patient Active Problem List    Diagnosis Date Noted    Pleural effusion 05/02/2022     Priority: Medium    Sepsis ([facility] 03/19/2022    Hepatic encephalopathy ([facility] 03/19/2022    Hearing loss 03/11/2022    Cirrhosis of liver with ascites ([address]     Secondary esophageal varices without bleeding (HCC)     Gastric polyps     SOB (shortness of breath) 09/13/2021    Portal hypertension ([facility] 08/30/2021    JENNIFER (obstructive sleep apnea) 08/09/2021    Bilateral hearing loss 05/07/2021    Increased ammonia level 05/07/2021    Class 3 severe obesity with body mass index (BMI) of 40.0 to 44.9 in adult [facility]) 09/21/2020    Hyperglycemia 12/04/2019    Acquired hypothyroidism 12/04/2019    Pulmonary nodules 44/23/5432    Alcoholic cirrhosis of liver with ascites (HCC)-Dr Jacques 08/08/2019    Mixed hyperlipidemia 08/08/2019    Hypertension 08/08/2019    History of alcohol abuse 08/08/2019    Gastroesophageal reflux disease without esophagitis 08/08/2019    Anxiety 08/08/2019    Shortness of breath 05/26/2019    History of colonic polyps 01/15/2019   Acute Hypoxic resp failure  Bilateral Pleural effusions R > L  Ascites  Rhino virus pneumonia  Grade II Diastolic dysfunction  Mild Pulmonary HTN  Alcoholic Cirrhosis   Portal HTN  Hypothyroidism  Morbid Obesity  ? JENNIFER      PLAN  1. Right thoracentesis and pleural fluid analysis  2. Paracentesis  3. Diuresis  4. BIPAP qhs and prn  5. OP PSG  6. Keep sats > 92%  7. CHF optimization  8.  C/w present management            Electronically signed by Joel Serrato MD on 5/4/2022 at 11:30 AM

## 2022-05-04 NOTE — PROGRESS NOTES
Failure ruled out after study  -- Other - I will add my own diagnosis  -- Disagree - Not applicable / Not valid  -- Disagree - Clinically unable to determine / Unknown  -- Refer to Clinical Documentation Reviewer    PROVIDER RESPONSE TEXT:    This patient is in acute respiratory failure as evidenced by requiring 2 L   oxygen, dyspnea, and O2 sat 84%.     Query created by: Raj Halsted on 5/3/2022 8:36 AM      Electronically signed by:  Yanet Kramer MD 5/3/2022 10:49 PM

## 2022-05-04 NOTE — PROGRESS NOTES
Hospitalist    Discussed with Dr. Mariann Coulter - paracentesis requested as pt is distended today, and reports he had dyspnea last night, and difficulty laying down. [FreeTextEntry1] : Assessment:\par 1.  Unprovoked PE\par 2.  Elevated PSA\par 3.  Enlarged prostate\par 4.  RV function decreased\par \par \par Plan:\par 1.  Continue xarelto 20mg daily \par 2.  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  Continue xarelto WITHOUT cessation until end of  June 2022 - at that point will decide on dosing of xarelto  - ? role for extended therapy \par 4.  Repeat duplex in 3 months. \par 5.  Urology followup - at this moment, would not want to hold a/c at this time.  Informed patient to give Dr. Zamora my cell phone to discuss options if needed. \par 6.  Return in June.

## 2022-05-04 NOTE — CONSULTS
.  Department of Internal Medicine  Gastroenterology Consult Note  Karoline Davis. Zhane CHANDLER      Reason for Consult:  Possible hepatic hydrothorax    Primary Care Physician:  Jeremy Coronado MD    History Obtained From:  patient, spouse    CC: dyspnea    HISTORY OF PRESENT ILLNESS:               The patient is a 78 y.o.  male known to me with alcoholic cirrhosis who states he has been sober for 3 weeks. His cirrhosis is complicated by recurrent ascites despite maximal tolerated doses of Lasix and Aldactone. He has been instructed in a low sodium diet but I doubt he is really compliant with that. He has mod-large esoph varices by EGD 1/19/22 and his last AFP was 2 on 3/21/22. He has had a few periods of confusion in the past and his ammonia level has been elevated periodically. he is on lactulose and Xifaxan. He was admitted now with dyspnea and a large right pleural effusion. He has no past cardiac or pulmonary disease. Past Medical History:        Diagnosis Date    Anxiety     Cirrhosis, alcoholic ([address]     GERD (gastroesophageal reflux disease) 2000    GERD (gastroesophageal reflux disease)     Hyperlipidemia     Hypertension     Portal hypertension (HCC)     Pulmonary nodules     Sleep apnea     SOB (shortness of breath)        Past Surgical History:        Procedure Laterality Date    CHOLECYSTECTOMY      COLONOSCOPY      ENDOSCOPY, COLON, DIAGNOSTIC      FOOT SURGERY      needle removed,not sure which foot, per wife.  UPPER GASTROINTESTINAL ENDOSCOPY N/A 1/19/2022    EGD BIOPSY performed by Jack Jansen MD at [address] [place] [address]         Medications Prior to Admission:    Prior to Admission medications    Medication Sig Start Date End Date Taking?  Authorizing Provider   levothyroxine (SYNTHROID) 50 MCG tablet Take 1 tablet by mouth Daily 3/30/22 9/26/22  An Moon MD   furosemide (LASIX) 40 MG tablet Take 1 tablet by mouth 2 times daily TAKE 1 tab am and pm 3/21/22 Geraldo Mott MD   spironolactone (ALDACTONE) 100 MG tablet Take 1 tablet by mouth daily 3/21/22 4/20/22  Geraldo Mott MD   lactulose [facility]) 10 GM/15ML solution Take 30 mLs by mouth 3 times daily Hold if having diarrhea 3/21/22   Geraldo Mott MD   rifaximin Janett Sheehan) 550 MG tablet Take 1 tablet by mouth 2 times daily  Patient not taking: Reported on 5/2/2022 3/21/22   Geraldo Mott MD   DULoxetine (CYMBALTA) 60 MG extended release capsule TAKE 1 CAPSULE BY MOUTH ONCE DAILY 3/11/22 1/13/23  Ramos Kilpatrick MD   nadolol (CORGARD) 40 MG tablet Take 1 tablet by mouth daily 11/18/21 2/14/22  Ramos Kilpatrick MD   ferrous sulfate (IRON 325) 325 (65 Fe) MG tablet Take 1 tablet by mouth  Patient not taking: Reported on 5/2/2022    Historical Provider, MD   zinc gluconate 50 MG tablet Take 50 mg by mouth 2 times daily    Historical Provider, MD   b complex vitamins capsule Take 1 capsule by mouth daily    Historical Provider, MD   albuterol sulfate HFA (VENTOLIN HFA) 108 (90 Base) MCG/ACT inhaler Inhale 2 puffs into the lungs 4 times daily as needed for Wheezing 8/30/21   Ramos Kilpatrick MD   tadalafil (CIALIS) 20 MG tablet Take 20 mg by mouth every 3 days     Historical Provider, MD   vitamin E 400 UNIT capsule Take 800 Units by mouth daily    Historical Provider, MD   magnesium oxide (MAG-OX) 400 MG tablet Take 200 mg by mouth 2 times daily OTC 1 qd 10/15/19   Historical Provider, MD   Multiple Vitamin (MULTI VITAMIN MENS PO) Take 1 tablet by mouth daily    Historical Provider, MD   ESOMEPRAZOLE MAGNESIUM PO Take 20 mg by mouth daily OTC    Historical Provider, MD   Newell-3 1000 MG CAPS Take 1 capsule by mouth daily    Historical Provider, MD       Allergies:     Allergies   Allergen Reactions    Lisinopril Swelling     Tongue swelling      Zetia [Ezetimibe] Swelling     Facial swelling    Atorvastatin     Codeine Other (See Comments)     Blood pressure drops    Hydrochlorothiazide     Hydroxyzine      Fatigue and depressed    Lexapro [Escitalopram Oxalate]      Increased depression    Pravastatin    . Social History:    TOBACCO:  No  ETOH:  Yes    Family History: reviewed and positives included in HPI- all other pertinent GI family history negative      REVIEW OF SYSTEMS: see HPI for positives and pertinent negatives. All other systems reviewed and are negative    PHYSICAL EXAM:    Vitals:  BP (!) 115/48   Pulse 62   Temp 97.8 °F (36.6 °C) (Oral)   Resp 16   Ht 5' 8\" (1.727 m)   Wt 285 lb (129.3 kg)   SpO2 94%   BMI 43.33 kg/m²   CONSTITUTIONAL: alert, cooperative, no apparent distress,   EYES:  pupils equal, round and reactive to light and sclera clear  ENT:  normocepalic, without obvious abnormality  NECK:  supple, symmetrical, trachea midline  HEMATOLOGIC/LYMPHATICS:  no cervical lymphadenopathy and no supraclavicular lymphadenopathy  LUNGS:  clear to auscultation  CARDIOVASCULAR:  regular rate and rhythm and no murmur noted  ABDOMEN:  Soft, non-tender with normal bowel sounds. 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 He is not a good transplant candidate with poor compliance and recent alcoholism. The biggest concern about a TIPS is whether it will markedly aggravate his PSE  2) check US Doppler of portal and hepatic veins  3) consider cardiac consult to rule out cardiac cause for the pleural effusion  4) [facility] has seen-will see what their opinion is about the effusion        Travis Santoyo M.D

## 2022-05-04 NOTE — PROGRESS NOTES
V2.0  [facility] Hospitalist Progress Note      Name:  Cassie Mckeon /Age/Sex: 1945  (78 y.o. male)   MRN & CSN:  [id number] & [id number] Encounter Date/Time: 2022 11:00 AM EDT    Location:  -A PCP: Davina Arango, [address] Day: 3    Assessment and Plan:   Cassie Mckeon is a 78 y.o. 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  -Worsening shortness of breath for 5 days prior to arrival  -X-ray showed complete opacitifiacation of the right hemithorax likely due to a large right pleural effusion  -Continue home diuretics  -Pulmonary consult  -Appreciate GI consult    Status post thoracentesis on May 3-2200 mL yellow cloudy fluid [id number] cultures    Rhinovirus Enterovirus PCR positive    History of CHF-we will consult cardiology as suggested by GI     Alcoholic cirrhosis with ascites  Portal hypertension-appreciate Dr. Genie Alcaraz consult  -Slightly elevated ammonia level, patient is alert and oriented  -Continue diuretics for ascites  -Continue Nadolol for portal hypertension  -Continue rifaximin and lactulose to prevent encephalopathy     Hypothyroidism  -Continue Synthroid 50 MCG     DVT prophylaxis  -Lovenox, start after thoracentesis    Diet ADULT DIET; Regular;  Low Sodium (2 gm)  Diet NPO   DVT Prophylaxis [x] Lovenox, []  Heparin, [] SCDs, [] Ambulation,  [] Eliquis, [] Xarelto  [] Coumadin   Code Status Full Code   Disposition From: Home  Expected Disposition: Home  Estimated Date of Discharge: About 3 days  Patient requires continued admission due to needs pulmonary consult, more short of breath, follow-up results of pleural studies   Surrogate Decision Maker/ POA  wife is listed as an alternate contact     Subjective:     Chief Complaint: Shortness of Breath (reports difficulty breathing for the past 5-6 days and extremely tired)       Cassie Mckeon is a 78 y.o. male     Before: He reports that he had trouble with orthopnea last night because of shortness of breath. Does not feel well because of continued difficulty breathing. May 3    He was more short of breath today        Review of Systems:    Review of Systems    No vomiting or bleeding noted. Objective: Intake/Output Summary (Last 24 hours) at 5/4/2022 1810  Last data filed at [facility]  Gross per 24 hour   Intake 250 ml   Output --   Net 250 ml        Vitals:   Vitals:    05/04/22 1534   BP: (!) 115/48   Pulse: 62   Resp: 16   Temp: 97.8 °F (36.6 °C)   SpO2: 94%       Physical Exam:     Physical Exam  Eyes:      General:         Right eye: No discharge. Left eye: No discharge. Pulmonary:      Effort: Pulmonary effort is normal.   Skin:     Coloration: Skin is not jaundiced. Neurological:      Mental Status: He is alert. Cranial Nerves: No cranial nerve deficit.          Medications:   Medications:    sodium chloride flush  5-40 mL IntraVENous 2 times per day    DULoxetine  60 mg Oral Daily    lactulose  20 g Oral TID    levothyroxine  50 mcg Oral Daily    magnesium oxide  200 mg Oral BID    nadolol  40 mg Oral Daily    rifAXIMin  550 mg Oral BID    spironolactone  100 mg Oral Daily    furosemide  40 mg Oral BID    sodium chloride flush  5-40 mL IntraVENous 2 times per day    pantoprazole  40 mg IntraVENous QAM AC    enoxaparin  30 mg SubCUTAneous BID      Infusions:    sodium chloride      sodium chloride       PRN Meds: sodium chloride flush, 5-40 mL, PRN  sodium chloride, , PRN  albuterol sulfate HFA, 2 puff, Q4H PRN  sodium chloride flush, 10 mL, PRN  sodium chloride, , PRN  nicotine polacrilex, 2 mg, Q1H PRN  acetaminophen, 650 mg, Q6H PRN   Or  acetaminophen, 650 mg, Q6H PRN  ondansetron, 4 mg, Q6H PRN  ALPRAZolam, 0.5 mg, Daily PRN        Labs      Recent Results (from the past 24 hour(s))   Basic Metabolic Panel w/ Reflex to MG    Collection Time: 05/04/22  1:09 AM   Result Value Ref Range    Sodium 140 135 - 145 MMOL/L    Potassium 4.1 3.5 - 5.1 MMOL/L    Chloride 101 99 - 110 mMol/L    CO2 31 21 - 32 MMOL/L    Anion Gap 8 4 - 16    BUN 18 6 - 23 MG/DL    CREATININE 1.1 0.9 - 1.3 MG/DL    Glucose 113 (H) 70 - 99 MG/DL    Calcium 9.1 8.3 - 10.6 MG/DL    GFR Non-African American >60 >60 mL/min/1.73m2    GFR African American >60 >60 mL/min/1.73m2   CBC    Collection Time: 05/04/22 11:42 AM   Result Value Ref Range    WBC 7.6 4.0 - 10.5 K/CU MM    RBC 4.05 (L) 4.6 - 6.2 M/CU MM    Hemoglobin 13.0 (L) 13.5 - 18.0 GM/DL    Hematocrit 41.3 (L) 42 - 52 %    .0 (H) 78 - 100 FL    MCH 32.1 (H) 27 - 31 PG    MCHC 31.5 (L) 32.0 - 36.0 %    RDW 14.5 11.7 - 14.9 %    Platelets 417 725 - 279 K/CU MM    MPV 10.6 7.5 - 11.1 FL   Protime-INR    Collection Time: 05/04/22 11:42 AM   Result Value Ref Range    Protime 14.6 (H) 11.7 - 14.5 SECONDS    INR 1.13 INDEX        Imaging/Diagnostics Last 24 Hours   XR CHEST PORTABLE    Result Date: 5/3/2022  EXAMINATION: ONE XRAY VIEW OF THE CHEST 5/2/2022 9:29 pm COMPARISON: May 2, 2022 HISTORY: ORDERING SYSTEM PROVIDED HISTORY: post right thoracentesis TECHNOLOGIST PROVIDED HISTORY: Reason for exam:->post right thoracentesis Reason for Exam: post right thoracentesis Additional signs and symptoms: NA Relevant Medical/Surgical History: hypertension FINDINGS: Status post right thoracentesis with interval improvement of the right pleural effusion. A small amount of aerated lung is seen. A large pleural effusion persists. No obvious pneumothorax. The left lung demonstrates a small pleural effusion. No acute or aggressive osseous lesion. 1. Status post right thoracentesis with new partial aeration of the right lung. Large pleural effusion persists. 2. Small left pleural effusion.      XR CHEST PORTABLE    Result Date: 5/2/2022  EXAMINATION: ONE XRAY VIEW OF THE CHEST 5/2/2022 1:29 pm COMPARISON: 02/11/2022 HISTORY: ORDERING SYSTEM PROVIDED HISTORY: SOB, known cirrhosis, decreased breath sounds R base; ?effusion TECHNOLOGIST PROVIDED HISTORY: Reason for exam:->SOB, known cirrhosis, decreased breath sounds R base; ?effusion Reason for Exam: SOB, known cirrhosis, decreased breath sounds R base; ?effusion Additional signs and symptoms: SOB, known cirrhosis, decreased breath sounds R base; ?effusion Relevant Medical/Surgical History: SOB, known cirrhosis, decreased breath sounds R base; ?effusion FINDINGS: Complete opacification of the right hemithorax likely related to a large right pleural effusion with severe atelectasis. Leftward shift of the mediastinal structures and trachea. The left lung is grossly clear. The cardiac silhouette is obscured. Complete opacification of the right hemithorax likely due to a large right pleural effusion and atelectasis.        Electronically signed by Ming Cueto MD on 5/4/2022 at 6:10 PM

## 2022-05-05 ENCOUNTER — APPOINTMENT (OUTPATIENT)
Dept: GENERAL RADIOLOGY | Age: 77
DRG: 432 | End: 2022-05-05
Payer: MEDICARE

## 2022-05-05 ENCOUNTER — ANESTHESIA (OUTPATIENT)
Dept: ICU | Age: 77
DRG: 432 | End: 2022-05-05
Payer: MEDICARE

## 2022-05-05 ENCOUNTER — ANESTHESIA EVENT (OUTPATIENT)
Dept: ICU | Age: 77
DRG: 432 | End: 2022-05-05
Payer: MEDICARE

## 2022-05-05 ENCOUNTER — APPOINTMENT (OUTPATIENT)
Dept: CT IMAGING | Age: 77
DRG: 432 | End: 2022-05-05
Payer: MEDICARE

## 2022-05-05 ENCOUNTER — APPOINTMENT (OUTPATIENT)
Dept: ULTRASOUND IMAGING | Age: 77
DRG: 432 | End: 2022-05-05
Payer: MEDICARE

## 2022-05-05 ENCOUNTER — APPOINTMENT (OUTPATIENT)
Dept: INTERVENTIONAL RADIOLOGY/VASCULAR | Age: 77
DRG: 432 | End: 2022-05-05
Payer: MEDICARE

## 2022-05-05 LAB
ALBUMIN FLUID: 0.5 GM/DL
ALBUMIN SERPL-MCNC: 2.8 GM/DL (ref 3.4–5)
ALP BLD-CCNC: 90 IU/L (ref 40–129)
ALT SERPL-CCNC: 18 U/L (ref 10–40)
AMMONIA: 40 UMOL/L (ref 16–60)
AMYLASE FLUID: 13 U/L
ANION GAP SERPL CALCULATED.3IONS-SCNC: 8 MMOL/L (ref 4–16)
ANION GAP SERPL CALCULATED.3IONS-SCNC: 9 MMOL/L (ref 4–16)
AST SERPL-CCNC: 34 IU/L (ref 15–37)
BASE EXCESS MIXED: 7 (ref 0–1.2)
BASE EXCESS MIXED: 7.9 (ref 0–1.2)
BASOPHILS ABSOLUTE: 0.1 K/CU MM
BASOPHILS RELATIVE PERCENT: 0.6 % (ref 0–1)
BILIRUB SERPL-MCNC: 1.1 MG/DL (ref 0–1)
BUN BLDV-MCNC: 15 MG/DL (ref 6–23)
BUN BLDV-MCNC: 17 MG/DL (ref 6–23)
CALCIUM SERPL-MCNC: 8.8 MG/DL (ref 8.3–10.6)
CALCIUM SERPL-MCNC: 9 MG/DL (ref 8.3–10.6)
CARBON MONOXIDE, BLOOD: 2.4 % (ref 0–5)
CARBON MONOXIDE, BLOOD: 2.5 % (ref 0–5)
CHLORIDE BLD-SCNC: 100 MMOL/L (ref 99–110)
CHLORIDE BLD-SCNC: 98 MMOL/L (ref 99–110)
CO2 CONTENT: 36.7 MMOL/L (ref 19–24)
CO2 CONTENT: 37.9 MMOL/L (ref 19–24)
CO2: 29 MMOL/L (ref 21–32)
CO2: 30 MMOL/L (ref 21–32)
COMMENT: ABNORMAL
COMMENT: ABNORMAL
CREAT SERPL-MCNC: 0.9 MG/DL (ref 0.9–1.3)
CREAT SERPL-MCNC: 1.1 MG/DL (ref 0.9–1.3)
DIFFERENTIAL TYPE: ABNORMAL
EKG ATRIAL RATE: 68 BPM
EKG DIAGNOSIS: NORMAL
EKG P AXIS: 72 DEGREES
EKG P-R INTERVAL: 180 MS
EKG Q-T INTERVAL: 422 MS
EKG QRS DURATION: 64 MS
EKG QTC CALCULATION (BAZETT): 448 MS
EKG R AXIS: 16 DEGREES
EKG T AXIS: 14 DEGREES
EKG VENTRICULAR RATE: 68 BPM
EOSINOPHILS ABSOLUTE: 0.1 K/CU MM
EOSINOPHILS RELATIVE PERCENT: 1.5 % (ref 0–3)
FLUID TYPE: NORMAL INDEX
GFR AFRICAN AMERICAN: >60 ML/MIN/1.73M2
GFR AFRICAN AMERICAN: >60 ML/MIN/1.73M2
GFR NON-AFRICAN AMERICAN: >60 ML/MIN/1.73M2
GFR NON-AFRICAN AMERICAN: >60 ML/MIN/1.73M2
GLUCOSE BLD-MCNC: 102 MG/DL (ref 70–99)
GLUCOSE BLD-MCNC: 107 MG/DL (ref 70–99)
GLUCOSE BLD-MCNC: 118 MG/DL (ref 70–99)
GLUCOSE BLD-MCNC: 214 MG/DL (ref 70–99)
HCO3 ARTERIAL: 34.8 MMOL/L (ref 18–23)
HCO3 ARTERIAL: 35.9 MMOL/L (ref 18–23)
HCT VFR BLD CALC: 33.8 % (ref 42–52)
HEMOGLOBIN: 11 GM/DL (ref 13.5–18)
HIGH SENSITIVE C-REACTIVE PROTEIN: 14.2 MG/L
IMMATURE NEUTROPHIL %: 0.3 % (ref 0–0.43)
LACTATE DEHYDROGENASE, FLUID: 50 IU/L
LV EF: 53 %
LVEF MODALITY: NORMAL
LYMPHOCYTES ABSOLUTE: 0.6 K/CU MM
LYMPHOCYTES RELATIVE PERCENT: 6.7 % (ref 24–44)
LYMPHOCYTES, BODY FLUID: 51 %
MAGNESIUM: 1.5 MG/DL (ref 1.8–2.4)
MCH RBC QN AUTO: 32.5 PG (ref 27–31)
MCHC RBC AUTO-ENTMCNC: 32.5 % (ref 32–36)
MCV RBC AUTO: 100 FL (ref 78–100)
MESOTHELIAL FLUID: 0 /100 WBC
METHEMOGLOBIN ARTERIAL: 1.2 %
METHEMOGLOBIN ARTERIAL: 1.7 %
MONOCYTE, FLUID: 41 %
MONOCYTES ABSOLUTE: 0.7 K/CU MM
MONOCYTES RELATIVE PERCENT: 8.2 % (ref 0–4)
NEUTROPHIL, FLUID: 8 %
NUCLEATED RBC %: 0 %
O2 SATURATION: 71.3 % (ref 96–97)
O2 SATURATION: 94.9 % (ref 96–97)
OTHER CELLS FLUID: 0
PCO2 ARTERIAL: 63 MMHG (ref 32–45)
PCO2 ARTERIAL: 65 MMHG (ref 32–45)
PDW BLD-RTO: 14.3 % (ref 11.7–14.9)
PH BLOOD: 7.35 (ref 7.34–7.45)
PH BLOOD: 7.35 (ref 7.34–7.45)
PHOSPHORUS: 1.1 MG/DL (ref 2.5–4.9)
PLATELET # BLD: 159 K/CU MM (ref 140–440)
PMV BLD AUTO: 10.9 FL (ref 7.5–11.1)
PO2 ARTERIAL: 41 MMHG (ref 75–100)
PO2 ARTERIAL: 95 MMHG (ref 75–100)
POTASSIUM SERPL-SCNC: 3.8 MMOL/L (ref 3.5–5.1)
POTASSIUM SERPL-SCNC: 4.1 MMOL/L (ref 3.5–5.1)
PROCALCITONIN: 0.06
PROTEIN FLUID: 1.9 GM/DL
RBC # BLD: 3.38 M/CU MM (ref 4.6–6.2)
RBC FLUID: 2000 /CU MM
SEGMENTED NEUTROPHILS ABSOLUTE COUNT: 7.5 K/CU MM
SEGMENTED NEUTROPHILS RELATIVE PERCENT: 82.7 % (ref 36–66)
SODIUM BLD-SCNC: 135 MMOL/L (ref 135–145)
SODIUM BLD-SCNC: 139 MMOL/L (ref 135–145)
SOURCE FLUID: NORMAL
TOTAL IMMATURE NEUTOROPHIL: 0.03 K/CU MM
TOTAL NUCLEATED RBC: 0 K/CU MM
TOTAL PROTEIN: 6.3 GM/DL (ref 6.4–8.2)
TSH HIGH SENSITIVITY: 0.9 UIU/ML (ref 0.27–4.2)
VITAMIN B-12: 768.8 PG/ML (ref 211–911)
WBC # BLD: 9.1 K/CU MM (ref 4–10.5)
WBC FLUID: 451 /CU MM

## 2022-05-05 PROCEDURE — 76937 US GUIDE VASCULAR ACCESS: CPT

## 2022-05-05 PROCEDURE — 2500000003 HC RX 250 WO HCPCS: Performed by: INTERNAL MEDICINE

## 2022-05-05 PROCEDURE — 0W9G3ZZ DRAINAGE OF PERITONEAL CAVITY, PERCUTANEOUS APPROACH: ICD-10-PCS | Performed by: SPECIALIST

## 2022-05-05 PROCEDURE — 84145 PROCALCITONIN (PCT): CPT

## 2022-05-05 PROCEDURE — 94002 VENT MGMT INPAT INIT DAY: CPT

## 2022-05-05 PROCEDURE — 80048 BASIC METABOLIC PNL TOTAL CA: CPT

## 2022-05-05 PROCEDURE — 84443 ASSAY THYROID STIM HORMONE: CPT

## 2022-05-05 PROCEDURE — A4216 STERILE WATER/SALINE, 10 ML: HCPCS | Performed by: INTERNAL MEDICINE

## 2022-05-05 PROCEDURE — 84157 ASSAY OF PROTEIN OTHER: CPT

## 2022-05-05 PROCEDURE — 82803 BLOOD GASES ANY COMBINATION: CPT

## 2022-05-05 PROCEDURE — 93010 ELECTROCARDIOGRAM REPORT: CPT | Performed by: INTERNAL MEDICINE

## 2022-05-05 PROCEDURE — 2580000003 HC RX 258: Performed by: NURSE PRACTITIONER

## 2022-05-05 PROCEDURE — 6360000002 HC RX W HCPCS

## 2022-05-05 PROCEDURE — 2500000003 HC RX 250 WO HCPCS

## 2022-05-05 PROCEDURE — 82607 VITAMIN B-12: CPT

## 2022-05-05 PROCEDURE — 86141 C-REACTIVE PROTEIN HS: CPT

## 2022-05-05 PROCEDURE — 6360000002 HC RX W HCPCS: Performed by: INTERNAL MEDICINE

## 2022-05-05 PROCEDURE — 89051 BODY FLUID CELL COUNT: CPT

## 2022-05-05 PROCEDURE — C1751 CATH, INF, PER/CENT/MIDLINE: HCPCS

## 2022-05-05 PROCEDURE — 87205 SMEAR GRAM STAIN: CPT

## 2022-05-05 PROCEDURE — 93975 VASCULAR STUDY: CPT

## 2022-05-05 PROCEDURE — 87040 BLOOD CULTURE FOR BACTERIA: CPT

## 2022-05-05 PROCEDURE — 6370000000 HC RX 637 (ALT 250 FOR IP): Performed by: NURSE PRACTITIONER

## 2022-05-05 PROCEDURE — 6370000000 HC RX 637 (ALT 250 FOR IP): Performed by: INTERNAL MEDICINE

## 2022-05-05 PROCEDURE — 2580000003 HC RX 258: Performed by: INTERNAL MEDICINE

## 2022-05-05 PROCEDURE — 74018 RADEX ABDOMEN 1 VIEW: CPT

## 2022-05-05 PROCEDURE — 6360000002 HC RX W HCPCS: Performed by: NURSE PRACTITIONER

## 2022-05-05 PROCEDURE — 83615 LACTATE (LD) (LDH) ENZYME: CPT

## 2022-05-05 PROCEDURE — 49083 ABD PARACENTESIS W/IMAGING: CPT

## 2022-05-05 PROCEDURE — C1769 GUIDE WIRE: HCPCS

## 2022-05-05 PROCEDURE — 94640 AIRWAY INHALATION TREATMENT: CPT

## 2022-05-05 PROCEDURE — 82140 ASSAY OF AMMONIA: CPT

## 2022-05-05 PROCEDURE — 87899 AGENT NOS ASSAY W/OPTIC: CPT

## 2022-05-05 PROCEDURE — 02HV33Z INSERTION OF INFUSION DEVICE INTO SUPERIOR VENA CAVA, PERCUTANEOUS APPROACH: ICD-10-PCS | Performed by: INTERNAL MEDICINE

## 2022-05-05 PROCEDURE — 82150 ASSAY OF AMYLASE: CPT

## 2022-05-05 PROCEDURE — 82042 OTHER SOURCE ALBUMIN QUAN EA: CPT

## 2022-05-05 PROCEDURE — 36592 COLLECT BLOOD FROM PICC: CPT

## 2022-05-05 PROCEDURE — 2000000000 HC ICU R&B

## 2022-05-05 PROCEDURE — P9047 ALBUMIN (HUMAN), 25%, 50ML: HCPCS | Performed by: SPECIALIST

## 2022-05-05 PROCEDURE — 94761 N-INVAS EAR/PLS OXIMETRY MLT: CPT

## 2022-05-05 PROCEDURE — 80053 COMPREHEN METABOLIC PANEL: CPT

## 2022-05-05 PROCEDURE — 70450 CT HEAD/BRAIN W/O DYE: CPT

## 2022-05-05 PROCEDURE — 84100 ASSAY OF PHOSPHORUS: CPT

## 2022-05-05 PROCEDURE — 76604 US EXAM CHEST: CPT

## 2022-05-05 PROCEDURE — 87081 CULTURE SCREEN ONLY: CPT

## 2022-05-05 PROCEDURE — 5A1955Z RESPIRATORY VENTILATION, GREATER THAN 96 CONSECUTIVE HOURS: ICD-10-PCS | Performed by: INTERNAL MEDICINE

## 2022-05-05 PROCEDURE — C9113 INJ PANTOPRAZOLE SODIUM, VIA: HCPCS | Performed by: NURSE PRACTITIONER

## 2022-05-05 PROCEDURE — 87449 NOS EACH ORGANISM AG IA: CPT

## 2022-05-05 PROCEDURE — 2709999900 HC NON-CHARGEABLE SUPPLY

## 2022-05-05 PROCEDURE — 36569 INSJ PICC 5 YR+ W/O IMAGING: CPT

## 2022-05-05 PROCEDURE — 31500 INSERT EMERGENCY AIRWAY: CPT

## 2022-05-05 PROCEDURE — 83735 ASSAY OF MAGNESIUM: CPT

## 2022-05-05 PROCEDURE — 36415 COLL VENOUS BLD VENIPUNCTURE: CPT

## 2022-05-05 PROCEDURE — 93306 TTE W/DOPPLER COMPLETE: CPT

## 2022-05-05 PROCEDURE — 2700000000 HC OXYGEN THERAPY PER DAY

## 2022-05-05 PROCEDURE — 99233 SBSQ HOSP IP/OBS HIGH 50: CPT | Performed by: INTERNAL MEDICINE

## 2022-05-05 PROCEDURE — 87070 CULTURE OTHR SPECIMN AEROBIC: CPT

## 2022-05-05 PROCEDURE — 82962 GLUCOSE BLOOD TEST: CPT

## 2022-05-05 PROCEDURE — 71045 X-RAY EXAM CHEST 1 VIEW: CPT

## 2022-05-05 PROCEDURE — C1729 CATH, DRAINAGE: HCPCS

## 2022-05-05 PROCEDURE — 36600 WITHDRAWAL OF ARTERIAL BLOOD: CPT

## 2022-05-05 PROCEDURE — 85025 COMPLETE CBC W/AUTO DIFF WBC: CPT

## 2022-05-05 PROCEDURE — 0BH17EZ INSERTION OF ENDOTRACHEAL AIRWAY INTO TRACHEA, VIA NATURAL OR ARTIFICIAL OPENING: ICD-10-PCS | Performed by: INTERNAL MEDICINE

## 2022-05-05 PROCEDURE — 99223 1ST HOSP IP/OBS HIGH 75: CPT | Performed by: INTERNAL MEDICINE

## 2022-05-05 PROCEDURE — 74176 CT ABD & PELVIS W/O CONTRAST: CPT

## 2022-05-05 PROCEDURE — 6360000002 HC RX W HCPCS: Performed by: SPECIALIST

## 2022-05-05 RX ORDER — LIDOCAINE HYDROCHLORIDE 10 MG/ML
5 INJECTION, SOLUTION EPIDURAL; INFILTRATION; INTRACAUDAL; PERINEURAL ONCE
Status: DISCONTINUED | OUTPATIENT
Start: 2022-05-05 | End: 2022-05-18 | Stop reason: HOSPADM

## 2022-05-05 RX ORDER — ETOMIDATE 2 MG/ML
INJECTION INTRAVENOUS PRN
Status: DISCONTINUED | OUTPATIENT
Start: 2022-05-05 | End: 2022-05-05 | Stop reason: SDUPTHER

## 2022-05-05 RX ORDER — MAGNESIUM SULFATE IN WATER 40 MG/ML
2000 INJECTION, SOLUTION INTRAVENOUS ONCE
Status: COMPLETED | OUTPATIENT
Start: 2022-05-05 | End: 2022-05-05

## 2022-05-05 RX ORDER — CHLORHEXIDINE GLUCONATE 0.12 MG/ML
15 RINSE ORAL 2 TIMES DAILY
Status: DISCONTINUED | OUTPATIENT
Start: 2022-05-05 | End: 2022-05-11

## 2022-05-05 RX ORDER — SODIUM CHLORIDE 0.9 % (FLUSH) 0.9 %
5-40 SYRINGE (ML) INJECTION PRN
Status: DISCONTINUED | OUTPATIENT
Start: 2022-05-05 | End: 2022-05-18

## 2022-05-05 RX ORDER — NOREPINEPHRINE BIT/0.9 % NACL 16MG/250ML
1-100 INFUSION BOTTLE (ML) INTRAVENOUS CONTINUOUS
Status: DISCONTINUED | OUTPATIENT
Start: 2022-05-05 | End: 2022-05-18

## 2022-05-05 RX ORDER — ACETAMINOPHEN 325 MG/1
325 TABLET ORAL EVERY 4 HOURS
Status: DISCONTINUED | OUTPATIENT
Start: 2022-05-05 | End: 2022-05-05

## 2022-05-05 RX ORDER — SODIUM CHLORIDE 9 MG/ML
INJECTION, SOLUTION INTRAVENOUS PRN
Status: DISCONTINUED | OUTPATIENT
Start: 2022-05-05 | End: 2022-05-18 | Stop reason: HOSPADM

## 2022-05-05 RX ORDER — SUCCINYLCHOLINE CHLORIDE 20 MG/ML
INJECTION INTRAMUSCULAR; INTRAVENOUS PRN
Status: DISCONTINUED | OUTPATIENT
Start: 2022-05-05 | End: 2022-05-05 | Stop reason: SDUPTHER

## 2022-05-05 RX ORDER — SODIUM CHLORIDE 0.9 % (FLUSH) 0.9 %
5-40 SYRINGE (ML) INJECTION EVERY 12 HOURS SCHEDULED
Status: DISCONTINUED | OUTPATIENT
Start: 2022-05-05 | End: 2022-05-18 | Stop reason: HOSPADM

## 2022-05-05 RX ORDER — OXYCODONE HYDROCHLORIDE 10 MG/1
10 TABLET ORAL EVERY 4 HOURS
Status: DISCONTINUED | OUTPATIENT
Start: 2022-05-05 | End: 2022-05-05

## 2022-05-05 RX ORDER — FENTANYL CITRATE-0.9 % NACL/PF 10 MCG/ML
25-200 PLASTIC BAG, INJECTION (ML) INTRAVENOUS CONTINUOUS
Status: DISCONTINUED | OUTPATIENT
Start: 2022-05-05 | End: 2022-05-07

## 2022-05-05 RX ORDER — NOREPINEPHRINE BIT/0.9 % NACL 16MG/250ML
INFUSION BOTTLE (ML) INTRAVENOUS
Status: COMPLETED
Start: 2022-05-05 | End: 2022-05-05

## 2022-05-05 RX ORDER — ALBUMIN (HUMAN) 12.5 G/50ML
25 SOLUTION INTRAVENOUS ONCE
Status: COMPLETED | OUTPATIENT
Start: 2022-05-05 | End: 2022-05-05

## 2022-05-05 RX ORDER — PROPOFOL 10 MG/ML
10 INJECTION, EMULSION INTRAVENOUS CONTINUOUS
Status: DISCONTINUED | OUTPATIENT
Start: 2022-05-05 | End: 2022-05-07

## 2022-05-05 RX ORDER — ALBUTEROL SULFATE 90 UG/1
4 AEROSOL, METERED RESPIRATORY (INHALATION) EVERY 4 HOURS
Status: DISCONTINUED | OUTPATIENT
Start: 2022-05-05 | End: 2022-05-11

## 2022-05-05 RX ADMIN — SODIUM CHLORIDE, PRESERVATIVE FREE 10 ML: 5 INJECTION INTRAVENOUS at 20:11

## 2022-05-05 RX ADMIN — DULOXETINE 60 MG: 30 CAPSULE, DELAYED RELEASE ORAL at 20:09

## 2022-05-05 RX ADMIN — ALBUTEROL SULFATE 4 PUFF: 90 AEROSOL, METERED RESPIRATORY (INHALATION) at 15:57

## 2022-05-05 RX ADMIN — CHLORHEXIDINE GLUCONATE 0.12% ORAL RINSE 15 ML: 1.2 LIQUID ORAL at 20:10

## 2022-05-05 RX ADMIN — ALBUTEROL SULFATE 4 PUFF: 90 AEROSOL, METERED RESPIRATORY (INHALATION) at 23:15

## 2022-05-05 RX ADMIN — SODIUM CHLORIDE, PRESERVATIVE FREE 20 MG: 5 INJECTION INTRAVENOUS at 20:10

## 2022-05-05 RX ADMIN — Medication 25 MCG/HR: at 13:41

## 2022-05-05 RX ADMIN — LACTULOSE 20 G: 10 SOLUTION ORAL at 16:34

## 2022-05-05 RX ADMIN — ALBUTEROL SULFATE 4 PUFF: 90 AEROSOL, METERED RESPIRATORY (INHALATION) at 19:25

## 2022-05-05 RX ADMIN — ETOMIDATE 30 MG: 2 INJECTION, SOLUTION INTRAVENOUS at 13:16

## 2022-05-05 RX ADMIN — PANTOPRAZOLE SODIUM 40 MG: 40 INJECTION, POWDER, FOR SOLUTION INTRAVENOUS at 05:27

## 2022-05-05 RX ADMIN — SODIUM CHLORIDE, PRESERVATIVE FREE 20 MG: 5 INJECTION INTRAVENOUS at 14:21

## 2022-05-05 RX ADMIN — RIFAXIMIN 550 MG: 550 TABLET ORAL at 20:09

## 2022-05-05 RX ADMIN — SODIUM CHLORIDE 25 ML/HR: 9 INJECTION, SOLUTION INTRAVENOUS at 14:32

## 2022-05-05 RX ADMIN — PROPOFOL 45 MCG/KG/MIN: 10 INJECTION, EMULSION INTRAVENOUS at 23:18

## 2022-05-05 RX ADMIN — PROPOFOL 10 MCG/KG/MIN: 10 INJECTION, EMULSION INTRAVENOUS at 13:40

## 2022-05-05 RX ADMIN — CHLORHEXIDINE GLUCONATE 0.12% ORAL RINSE 15 ML: 1.2 LIQUID ORAL at 14:21

## 2022-05-05 RX ADMIN — PROPOFOL 45 MCG/KG/MIN: 10 INJECTION, EMULSION INTRAVENOUS at 20:24

## 2022-05-05 RX ADMIN — ALBUMIN (HUMAN) 25 G: 0.25 INJECTION, SOLUTION INTRAVENOUS at 09:00

## 2022-05-05 RX ADMIN — Medication 5 MCG/MIN: at 15:52

## 2022-05-05 RX ADMIN — MAGNESIUM OXIDE 400 MG (241.3 MG MAGNESIUM) TABLET 200 MG: TABLET at 20:10

## 2022-05-05 RX ADMIN — SODIUM PHOSPHATE, MONOBASIC, MONOHYDRATE 15 MMOL: 276; 142 INJECTION, SOLUTION INTRAVENOUS at 18:47

## 2022-05-05 RX ADMIN — PROPOFOL 35 MCG/KG/MIN: 10 INJECTION, EMULSION INTRAVENOUS at 16:59

## 2022-05-05 RX ADMIN — LACTULOSE 20 G: 10 SOLUTION ORAL at 20:10

## 2022-05-05 RX ADMIN — MAGNESIUM SULFATE HEPTAHYDRATE 2000 MG: 2 INJECTION, SOLUTION INTRAVENOUS at 17:56

## 2022-05-05 RX ADMIN — VANCOMYCIN HYDROCHLORIDE 2000 MG: 1 INJECTION, POWDER, LYOPHILIZED, FOR SOLUTION INTRAVENOUS at 14:55

## 2022-05-05 RX ADMIN — CEFEPIME HYDROCHLORIDE 2000 MG: 2 INJECTION, POWDER, FOR SOLUTION INTRAVENOUS at 14:33

## 2022-05-05 RX ADMIN — FUROSEMIDE 5 MG/HR: 100 INJECTION, SOLUTION INTRAMUSCULAR; INTRAVENOUS at 12:02

## 2022-05-05 RX ADMIN — SUCCINYLCHOLINE CHLORIDE 100 MG: 20 INJECTION, SOLUTION INTRAMUSCULAR; INTRAVENOUS at 13:16

## 2022-05-05 RX ADMIN — CEFEPIME HYDROCHLORIDE 2000 MG: 2 INJECTION, POWDER, FOR SOLUTION INTRAVENOUS at 20:13

## 2022-05-05 ASSESSMENT — PULMONARY FUNCTION TESTS
PIF_VALUE: 32
PIF_VALUE: 29
PIF_VALUE: 32
PIF_VALUE: 32
PIF_VALUE: 31
PIF_VALUE: 27
PIF_VALUE: 30
PIF_VALUE: 33
PIF_VALUE: 29
PIF_VALUE: 31
PIF_VALUE: 25
PIF_VALUE: 39
PIF_VALUE: 29
PIF_VALUE: 31
PIF_VALUE: 35
PIF_VALUE: 14
PIF_VALUE: 30
PIF_VALUE: 32
PIF_VALUE: 38
PIF_VALUE: 30
PIF_VALUE: 31
PIF_VALUE: 35
PIF_VALUE: 27
PIF_VALUE: 30
PIF_VALUE: 31
PIF_VALUE: 27
PIF_VALUE: 30
PIF_VALUE: 37
PIF_VALUE: 29
PIF_VALUE: 31
PIF_VALUE: 34
PIF_VALUE: 28
PIF_VALUE: 31
PIF_VALUE: 30
PIF_VALUE: 32
PIF_VALUE: 31

## 2022-05-05 NOTE — PROGRESS NOTES
Had repeat paracentesis today-- WBC was 450 but in is the total NEUTROPHIL count that is what is important- in his case his neutrophil count is 8% x 451= 36-- which is not suggestive of SBP.   I have accidentally been locked out of [employer] so if you need to contact me call [phone]

## 2022-05-05 NOTE — CONSULTS
Comprehensive Nutrition Assessment    Type and Reason for Visit:  Consult    Nutrition Recommendations/Plan:   1. EN Order: Vital HP @ 45 ml/hr which will provide ~1898 kcal (including kcal from propofol) and 94 g protein  2. Add Proteinex BID to help meet estimated protein needs  3. Advance to goal rate as tolerated  4. Will closely monitor GI tolerance, nutrition status, poc     Malnutrition Assessment:  Malnutrition Status:  Insufficient data (05/05/22 1508)    Context:  Acute Illness       Nutrition Assessment:    Pt now sedated on vent, +Lasix ggt, +OGT, MAP greater than 65, dietitian consult for tube feed order and manage, s/p paracentesis with 6900 cc milky yellow ascitic fluid removed, will continue to follow at high nutrition risk    Nutrition Related Findings:      Wound Type: None       Current Nutrition Intake & Therapies:    Average Meal Intake: NPO (pt intubated)  Average Supplements Intake: NPO  Additional Calorie Sources:  · Pt is receiving ~818 kcal from current propofol rate    Anthropometric Measures:  Height: 5' 7.99\" (172.7 cm)  Ideal Body Weight (IBW): 154 lbs (70 kg)    Admission Body Weight:  (n/a)  Current Body Weight: 285 lb 0.9 oz (129.3 kg) (stated), 185.1 % IBW.     Current BMI (kg/m2): 43.4  BMI Categories: Obese Class 3 (BMI 40.0 or greater)    Estimated Daily Nutrient Needs:  Weight Used for Energy Requirements: Current  Energy (kcal/day): 1891 ([facility] 2010)  Weight Used for Protein Requirements: Ideal  Protein (g/day): 140 (2.0 g/kg)    Nutrition Diagnosis:   · Inadequate oral intake related to acute injury/trauma as evidenced by NPO or clear liquid status due to medical condition    Nutrition Interventions:   Food and/or Nutrient Delivery: Start Tube Feeding  Nutrition Education/Counseling: No recommendation at this time  Coordination of Nutrition Care: Continue to monitor while inpatient     Goals:  Previous Goal Met: Progressing toward Goal(s)  Goals: Initiate nutrition support,within 2 days     Nutrition Monitoring and Evaluation:   Behavioral-Environmental Outcomes: None Identified  Food/Nutrient Intake Outcomes: Enteral Nutrition Intake/Tolerance  Physical Signs/Symptoms Outcomes: Biochemical Data,GI Status,Hemodynamic Status,Fluid Status or Edema,Weight    Discharge Planning:     Too soon to determine     Ginger Kong [address], [address] [place] [address],   Contact: [phone]

## 2022-05-05 NOTE — FLOWSHEET NOTE
05/05/22 0953   Treatment Team Notification   Reason for Communication Change in status; Evaluate   Team Member Name Dr. Luis James Attending Provider   Method of Communication Secure Message   Response En route   Notification Time [phone]      Pt back from paracentesis, very lethargic and short of breath, O2 increase to 3l, Spo2 92%.   MD notified

## 2022-05-05 NOTE — CONSULTS
PICC Consult complete. Education regarding PICC insertion discussed and risks and benefits assessed with bedside nurse. Dee LAGUNAS verbalized understanding. Consent signed by medical necessity. Time out done @ 1350. PICC inserted in RUE Basilic vessel without difficulty per protocol. Zechariah wire required to turn PICC into SVC. Placement verified via VPSG4 monitoring system. Good blood return and flushes easily on three ports. Patient tolerated well. Education pamphlets left in chart  Ultrasound photos of vein diameter and doppler signature placed in chart. Please consult IV/PICC team if patient's needs change. PICC OK to use.

## 2022-05-05 NOTE — PROGRESS NOTES
[address] [facility]  consulted by Dr. Carolina Gaitan for monitoring and adjustment. Indication for treatment: CAP  Goal trough: [] 10-15 mcg/mL or [x] 15-20 mcg/ml  AUC/TRES: [] <500 or [x] 400-600    Pertinent Laboratory Values:   Temp Readings from Last 3 Encounters:   05/05/22 97.2 °F (36.2 °C) (Axillary)   04/25/22 97.2 °F (36.2 °C) (Infrared)   03/21/22 98.3 °F (36.8 °C) (Oral)     Recent Labs     05/02/22  1255 05/04/22  1142   WBC 7.0 7.6     Recent Labs     05/02/22  1255 05/04/22  0109 05/05/22  0336   BUN 20 18 15   CREATININE 1.1 1.1 0.9     Estimated Creatinine Clearance: 95 mL/min (based on SCr of 0.9 mg/dL). No intake or output data in the 24 hours ending 05/05/22 1239    Pertinent Cultures:  Date    Source    Results  5/2   Resp PCR   Rhinovirus  5/2   Anaerobic (chest)  Pending  5/2   Pleural Fluid   NGTD   5/5   Ascitic Fluid   Ordered  5/5   MRSA nasal   Ordered      Vancomycin level:   TROUGH:  No results for input(s): VANCOTROUGH in the last 72 hours. RANDOM:  No results for input(s): VANCORANDOM in the last 72 hours.     Assessment:  · SCr, BUN, and urine output: WNL, stable  · Day(s) of therapy: 1  · Vancomycin concentration: to be collected    Plan:  · Vancomycin 2000 mg x1, followed by 1750 mg IVPB q24h  · Predicted trough: 10.3,   · Plan to collect a level in 48h  · Pharmacy will continue to monitor patient and adjust therapy as indicated    Rogelio 3 5/7 @ 0600    Thank you for the consult,  Gabo De Santiago [facility], PharmD  5/5/2022 12:39 PM

## 2022-05-05 NOTE — CONSULTS
CARDIOLOGY CONSULT NOTE   Reason for consultation: Pleural effusion    Referring physician:  Tara Sandifer, MD     Primary care physician: Jaspreet Cain MD      Dear   Thanks for the consult. History of present illness:Boby is a 78 y. o.year old who  presents with for shortness of breath which is moderate to severe, for weeks, intermittent, self limiting, not associated with cough or fever, gets worse with activity and better with rest, history of cirrhosis initially he had thoracentesis of the right lung and then he had paracentesis also, today repeat x-ray shows complete opacification of the right lung with recurrent pleural effusion he is lethargic and he has cirrhosis and most likely has high ammonia level as well, patient is arousable and however goes back to sleep, his wife is at the bedside which gives history about his condition    Chief Complaint   Patient presents with    Shortness of Breath     reports difficulty breathing for the past 5-6 days and extremely tired     Blood pressure, cholesterol, blood glucose and weight are well controlled. Past medical history:    has a past medical history of Anxiety, Cirrhosis, alcoholic ([address] GERD (gastroesophageal reflux disease), GERD (gastroesophageal reflux disease), Hyperlipidemia, Hypertension, Portal hypertension ([address] Pulmonary nodules, Sleep apnea, and SOB (shortness of breath). Past surgical history:   has a past surgical history that includes Cholecystectomy; Vasectomy; Colonoscopy; Endoscopy, colon, diagnostic; Foot surgery; and Upper gastrointestinal endoscopy (N/A, 1/19/2022). Social History:   reports that he quit smoking about 46 years ago. He has a 14.00 pack-year smoking history. He has never used smokeless tobacco. He reports previous alcohol use. He reports that he does not use drugs.   Family history:   no family history of CAD, STROKE of DM    Allergies   Allergen Reactions    Lisinopril Swelling     Tongue swelling  Zetia [Ezetimibe] Swelling     Facial swelling    Atorvastatin     Codeine Other (See Comments)     Blood pressure drops    Hydrochlorothiazide     Hydroxyzine      Fatigue and depressed    Lexapro [Escitalopram Oxalate]      Increased depression    Pravastatin        sodium chloride flush 0.9 % injection 5-40 mL, 2 times per day  sodium chloride flush 0.9 % injection 5-40 mL, PRN  0.9 % sodium chloride infusion, PRN  albuterol sulfate  (90 Base) MCG/ACT inhaler 2 puff, Q4H PRN  DULoxetine (CYMBALTA) extended release capsule 60 mg, Daily  lactulose (CHRONULAC) 10 GM/15ML solution 20 g, TID  levothyroxine (SYNTHROID) tablet 50 mcg, Daily  magnesium oxide (MAG-OX) tablet 200 mg, BID  nadolol (CORGARD) tablet 40 mg, Daily  rifAXIMin (XIFAXAN) tablet 550 mg, BID  spironolactone (ALDACTONE) tablet 100 mg, Daily  furosemide (LASIX) tablet 40 mg, BID  sodium chloride flush 0.9 % injection 5-40 mL, 2 times per day  sodium chloride flush 0.9 % injection 10 mL, PRN  0.9 % sodium chloride infusion, PRN  nicotine polacrilex (COMMIT) lozenge 2 mg, Q1H PRN  acetaminophen (TYLENOL) tablet 650 mg, Q6H PRN   Or  acetaminophen (TYLENOL) suppository 650 mg, Q6H PRN  ondansetron (ZOFRAN) injection 4 mg, Q6H PRN  ALPRAZolam (XANAX) tablet 0.5 mg, Daily PRN  pantoprazole (PROTONIX) injection 40 mg, QAM AC  enoxaparin Sodium (LOVENOX) injection 30 mg, BID      Current Facility-Administered Medications   Medication Dose Route Frequency Provider Last Rate Last Admin    sodium chloride flush 0.9 % injection 5-40 mL  5-40 mL IntraVENous 2 times per day Hammad Zavala MD   10 mL at 05/04/22 2104    sodium chloride flush 0.9 % injection 5-40 mL  5-40 mL IntraVENous PRN Hammad Zavala MD        0.9 % sodium chloride infusion   IntraVENous PRN Hammad Zavala MD        albuterol sulfate  (90 Base) MCG/ACT inhaler 2 puff  2 puff Inhalation Q4H PRN Maximiano Rosedale, APRN - CNP        DULoxetine (CYMBALTA) extended release capsule 60 mg  60 mg Oral Daily Janie Iba, APRN - CNP   60 mg at 05/04/22 2102    lactulose (CHRONULAC) 10 GM/15ML solution 20 g  20 g Oral TID Janie Iba, APRN - CNP   20 g at 05/04/22 2103    levothyroxine (SYNTHROID) tablet 50 mcg  50 mcg Oral Daily Janie Iba, APRN - CNP   50 mcg at 05/04/22 0538    magnesium oxide (MAG-OX) tablet 200 mg  200 mg Oral BID Janie Iba, APRN - CNP   200 mg at 05/04/22 2102    nadolol (CORGARD) tablet 40 mg  40 mg Oral Daily Janie Iba, APRN - CNP   40 mg at 05/04/22 0918    rifAXIMin (XIFAXAN) tablet 550 mg  550 mg Oral BID Janie Iba, APRN - CNP   550 mg at 05/04/22 2102    spironolactone (ALDACTONE) tablet 100 mg  100 mg Oral Daily Janie Iba, APRN - CNP   100 mg at 05/04/22 0919    furosemide (LASIX) tablet 40 mg  40 mg Oral BID Janie Iba, APRN - CNP   40 mg at 05/04/22 1736    sodium chloride flush 0.9 % injection 5-40 mL  5-40 mL IntraVENous 2 times per day Janie Iba, APRN - CNP   10 mL at 05/04/22 2104    sodium chloride flush 0.9 % injection 10 mL  10 mL IntraVENous PRN Janie Iba, APRN - CNP        0.9 % sodium chloride infusion   IntraVENous PRN Janie Iba, APRN - CNP        nicotine polacrilex (COMMIT) lozenge 2 mg  2 mg Oral Q1H PRN Janie Iba, APRN - CNP        acetaminophen (TYLENOL) tablet 650 mg  650 mg Oral Q6H PRN Janie Iba, APRN - CNP        Or    acetaminophen (TYLENOL) suppository 650 mg  650 mg Rectal Q6H PRN Janie Iba, APRN - CNP        ondansetron [facility]) injection 4 mg  4 mg IntraVENous Q6H PRN Janie Iba, APRN - CNP        ALPRAZolam Yissel Pamlico) tablet 0.5 mg  0.5 mg Oral Daily PRN Janie Iba, APRN - CNP   0.5 mg at 05/03/22 1255    pantoprazole (PROTONIX) injection 40 mg  40 mg IntraVENous QAM AC MOHINDER Hanks - CNP   40 mg at 05/05/22 0527    enoxaparin Sodium (LOVENOX) injection 30 mg  30 mg SubCUTAneous BID MOHINDER Hanks - CNP   30 mg at 05/04/22 9646     Review of Systems:   · Constitutional: No Fever or Weight Loss   · Eyes: No Decreased Vision  · ENT: No Headaches, Hearing Loss or Vertigo  · Cardiovascular: No chest pain, dyspnea on exertion, palpitations or loss of consciousness  · Respiratory: No cough or wheezing    · Gastrointestinal: No abdominal pain, appetite loss, blood in stools, constipation, diarrhea or heartburn  · Genitourinary: No dysuria, trouble voiding, or hematuria  · Musculoskeletal:  No gait disturbance, weakness or joint complaints  · Integumentary: No rash or pruritis  · Neurological: No TIA or stroke symptoms  · Psychiatric: No anxiety or depression  · Endocrine: No malaise, fatigue or temperature intolerance  · Hematologic/Lymphatic: No bleeding problems, blood clots or swollen lymph nodes  · Allergic/Immunologic: No nasal congestion or hives  All systems negative except as marked. ·   ·      Physical Examination:    Vitals:    05/05/22 0927   BP: (!) 127/105   Pulse: 71   Resp: 18   Temp: 98.5   SpO2:       Wt Readings from Last 3 Encounters:   05/04/22 285 lb (129.3 kg)   04/25/22 282 lb (127.9 kg)   03/21/22 298 lb 11.6 oz (135.5 kg)     Body mass index is 43.33 kg/m². General Appearance:  No distress, conversant    Constitutional:  Well developed, Well nourished, No acute distress, Non-toxic appearance. HENT:  Normocephalic, Atraumatic, Bilateral external ears normal, Oropharynx moist, No oral exudates, Nose normal. Neck- Normal range of motion, No tenderness, Supple, No stridor,no apical-carotid delay, no carotid bruit  Eyes:  PERRL, EOMI, Conjunctiva normal, No discharge. Respiratory:  Normal breath sounds, No respiratory distress, No wheezing, No chest tenderness. ,no use of accessory muscles, diaphragm movement is normal  Cardiovascular: (PMI) apex non displaced,no lifts no thrills, no s3,no s4, Normal heart rate, Normal rhythm, No murmurs, No rubs, No gallops.  Carotid arteries pulse and amplitude are normal no bruit, no abdominal bruit noted ( normal abdominal aorta ausculation), femoral arteries pulse and amplitude are normal no bruit, pedal pulses are normal  GI:  Bowel sounds normal, Soft, No tenderness, No masses, No pulsatile masses, no hepatosplenomegally, no bruits  : External genitalia appear normal, No masses or lesions. No discharge. No CVA tenderness. Musculoskeletal:  Intact distal pulses, +edema, No tenderness, No cyanosis, No clubbing. Good range of motion in all major joints. No tenderness to palpation or major deformities noted. Back- No tenderness. Integument:  Warm, Dry, No erythema, No rash. Skin: no rash, no ulcers  Lymphatic:  No lymphadenopathy noted. Neurologic:  Alert & oriented x 3, Normal motor function, Normal sensory function, No focal deficits noted. Psychiatric:  Affect normal, Judgment normal, Mood normal.   Lab Review   Recent Labs     05/04/22  1142   WBC 7.6   HGB 13.0*   HCT 41.3*         Recent Labs     05/05/22  0336      K 4.1      CO2 30   BUN 15   CREATININE 0.9     Recent Labs     05/02/22  1255   AST 39*   ALT 20   BILITOT 0.6   ALKPHOS 118     No results for input(s): TROPONINI in the last 72 hours. No results found for: BNP  Lab Results   Component Value Date    INR 1.13 05/04/2022    PROTIME 14.6 (H) 05/04/2022         EKG:nsr    Chest Xray: Complete opacification of right lung with pleural effusion congestive heart failure    ECHO:normal LVEF  Labs, echo, meds reviewed  Assessment: 78 y. o.year old with PMH of  has a past medical history of Anxiety, Cirrhosis, alcoholic ([address] GERD (gastroesophageal reflux disease), GERD (gastroesophageal reflux disease), Hyperlipidemia, Hypertension, Portal hypertension ([address] Pulmonary nodules, Sleep apnea, and SOB (shortness of breath). Recommendations:    1.  Recurrent pleural effusion most likely from fluid overload with cirrhosis and diastolic heart failure, patient already had a right lung thoracentesis few days ago and had a present is also recently, will start Lasix drip and will get ABG and ammonia level for his lethargy, will recommend to pulmonary to discuss about whether he needs a pleurodesis for his recurrent pleural effusion  2. Altered mental status recommend to check ABG and ammonia level and use lactulose if ammonia is high  3. Cirrhosis as per GI  4. GERD continue Protonix  5. Pretension stable  All labs, medications and tests reviewed, continue all other medications of all above medical condition listed as is.          Ivy Tony MD, 5/5/2022 11:23 AM

## 2022-05-05 NOTE — PROGRESS NOTES
Hospitalist      WBC COUNT ON ASCITIC FLUID  (given 2 K RBC in fluid it corrects to 443). This appears to be consistent with SBP. Will consult ID. Will ck CT abd and pelvis. Last one was done 9/21 - had AVN L femoral head at that time.

## 2022-05-05 NOTE — ANESTHESIA PROCEDURE NOTES
Airway  Date/Time: 5/5/2022 1:18 PM  Urgency: emergent    Airway not difficult    General Information and Staff    Patient location during procedure: ICU  Resident/CRNA: MOHINDER Edmondson CRNA  Performed: resident/CRNA     Consent for Airway (if performed for an anesthetic, see related documentation for consents)  Patient identity confirmed: per hospital policy  Consent: The procedure was performed in an emergent situation. Verbal consent not obtained. Written consent not obtained. Risks and benefits: risks, benefits and alternatives were not discussed      Code status verified:yes  Indications and Patient Condition  Indications for airway management: respiratory failure and respiratory distress  Spontaneous ventilation: present  Sedation level: deep  Preoxygenated: yes  Patient position: sniffing  MILS maintained throughout  Mask difficulty assessment: vent by bag mask    Final Airway Details  Final airway type: endotracheal airway      Successful airway: ETT  Cuffed: yes   Successful intubation technique: video laryngoscopy  Facilitating devices/methods: intubating stylet  Blade type: GLIDESCOPE.   Blade size: #4  ETT size (mm): 8.0  Cormack-Lehane Classification: grade I - full view of glottis  Placement verified by: capnometry   Measured from: gums  ETT to gums (cm): 23  Number of attempts at approach: 1  Ventilation between attempts: bag mask  Number of other approaches attempted: 0    no

## 2022-05-05 NOTE — PROGRESS NOTES
4 Eyes Skin Assessment     NAME:  Anitha Benz OF BIRTH:  1945  MEDICAL RECORD NUMBER:  [id number]    The patient is being assess for  Transfer to New Unit    I agree that 2 RN's have performed a thorough Head to Toe Skin Assessment on the patient. ALL assessment sites listed below have been assessed. Areas assessed by both nurses:  Face, neck, chest, abdomen, back, sacrum, coccyx, buttocks, arms, legs          Does the Patient have a Wound?  No noted wound(s)       Andre Prevention initiated:  Yes   Wound Care Orders initiated:  NA    Pressure Injury (Stage 3,4, Unstageable, DTI, NWPT, and Complex wounds) if present place consult order under [de-identified] NA    New and Established Ostomies if present place consult order under : No      Nurse 1 eSignature: Electronically signed by Aron Ricci RN on 5/5/22 at 1:59 PM EDT    **SHARE this note so that the co-signing nurse is able to place an eSignature**    Nurse 2: Lucinda Patricio RN

## 2022-05-05 NOTE — PROGRESS NOTES
SPOKE WITH IR REGARDING PARA THAT WAS PERFORMED THIS MORNING. STATES THEY REMOVED 6900 OF FLUID FROM ABDOMEN AND THAT O2 HAD BEEN INCREASED TO 3LPM D/T SATS IN THE 80S.

## 2022-05-05 NOTE — PROGRESS NOTES
Pulmonary and Critical Care  Progress Note      VITALS:  /67   Pulse 65   Temp 97.6 °F (36.4 °C) (Oral)   Resp 16   Ht 5' 8\" (1.727 m)   Wt 285 lb (129.3 kg)   SpO2 92%   BMI 43.33 kg/m²     Subjective:   CHIEF COMPLAINT :SOB     HPI:                The patient is sitting in the bed. He had paracentesis done today removed & litres. He is in mild resp distress. He is sleepy     Objective:   PHYSICAL EXAM:    LUNGS:Decreased air entry right base  Abd-distended, BS+,NT  Ext- no pedal edema  CVS-s1s2, no murmurs      DATA:    CBC:  Recent Labs     05/02/22  1255 05/04/22  1142   WBC 7.0 7.6   RBC 4.31* 4.05*   HGB 13.8 13.0*   HCT 43.4 41.3*    199   .7* 102.0*   MCH 32.0* 32.1*   MCHC 31.8* 31.5*   RDW 14.8 14.5   SEGSPCT 65.3  --       BMP:  Recent Labs     05/02/22  1255 05/04/22  0109 05/05/22  0336    140 139   K 3.8 4.1 4.1    101 100   CO2 28 31 30   BUN 20 18 15   CREATININE 1.1 1.1 0.9   CALCIUM 9.6 9.1 9.0   GLUCOSE 80 113* 118*      ABG:  No results for input(s): PH, PO2ART, CJH0BGZ, HCO3, BEART, O2SAT in the last 72 hours. BNP  No results found for: BNP   D-Dimer:  Lab Results   Component Value Date    DDIMER 1606 (H) 09/13/2021      Radiology:   Significant progression of opacification of the right hemithorax, which is   now complete.  This likely represent progressed pleural effusion and   atelectasis.       Likely trace left pleural effusion, similar to prior     1.        Assessment/Plan     Patient Active Problem List    Diagnosis Date Noted    Acute respiratory failure ([address] 05/04/2022     Priority: Medium    Acute on chronic respiratory failure with hypoxemia (HCC)      Priority: Medium    Pleural effusion 05/02/2022     Priority: Medium    Sepsis ([address] 03/19/2022    Hepatic encephalopathy ([address] 03/19/2022    Hearing loss 03/11/2022    Cirrhosis of liver with ascites ([address] [address]     Secondary esophageal varices without bleeding (HCC)     Gastric polyps     SOB (shortness of breath) 09/13/2021    Portal hypertension ([address] 08/30/2021    JENNIFER (obstructive sleep apnea) 08/09/2021    Bilateral hearing loss 05/07/2021    Increased ammonia level 05/07/2021    Class 3 severe obesity with body mass index (BMI) of 40.0 to 44.9 in adult [facility]) 09/21/2020    Hyperglycemia 12/04/2019    Acquired hypothyroidism 12/04/2019    Pulmonary nodules 67/35/9002    Alcoholic cirrhosis of liver with ascites (HCC)-Dr Jacques 08/08/2019    Mixed hyperlipidemia 08/08/2019    Hypertension 08/08/2019    History of alcohol abuse 08/08/2019    Gastroesophageal reflux disease without esophagitis 08/08/2019    Anxiety 08/08/2019    Shortness of breath 05/26/2019    History of colonic polyps 01/15/2019   Acute Hypoxic resp failure  Recurrent Right pleural effusion likely sec to Hepatic Hydrothorax  Ascites s/p paracentesis  Rhino virus pneumonia  Grade II Diastolic dysfunction  Mild Pulmonary HTN  Alcoholic Cirrhosis   Portal HTN  Hypothyroidism  Morbid Obesity  ? JENNIFER       1. ABG now  2. Abx  3. F/u C&S  4. Right thoracentesis at bedside today at noon  5. Diuresis  6. BIPAP qhs and prn  7. Keep sats > 92%  8. ICS  9. OOB  10. CHF optiomization  11. CXR post thora and in am  12.  C/w present management    Electronically signed by Parveen Zarate MD on 5/5/2022 at 8:45 AM

## 2022-05-05 NOTE — PROGRESS NOTES
PROCEDURE PERFORMED: paracentesis    PRIMARY INDICATION FOR PROCEDURE: ascites    INFORMED CONSENT:  Obtained prior to procedure. Consent placed in chart. PT TRANSPORTED FROM:   2018                                 TO THE IR ROOM:    Small                    ARRIVED TO ROOM: 0825    ASSESSMENT: Pt disoriented to place, time & situation and very lethargic. On 2LNC with sats in the low 90's. Increased to [facility]. Consent obtained from wife. STAFF PRESENT: Dr. Stormy Botello RN, Damari RN    BARRIER PRECAUTIONS & STERILE TECHNIQUE:               Pt positioned supine for comfort. Warm blankets given. Pt placed on Cardiac Monitor. Pt and draped in a sterile fashion with chlorhexadine.     TIME OUT:  5589    PAIN/LOCAL ANESTHESIA/SEDATION MANAGEMENT:           Local: Lidocaine 1% given by Dr. Marilu Whitman:           ACCESS TIME: 8308          US/FLUORO: US 3 images          ACCESS USED: Safe-T-Centesis   25g of 25% albumin administered IV      STERILE DRESSINGS: guaze & tegaderm     SPECIMENS: 6900cc milky yellow ascitic fluid removed, 100cc sent to lab    EBL: <1cc         FOLLOW- UP X-RAY: None    COMPLICATIONS/ OUTCOME: Pt tolerated well    LEFT THE ROOM: 0914    REPORT CALLED TO: Eliz Mullen 2E

## 2022-05-05 NOTE — PROGRESS NOTES
Hospitalist      Called re: lethargy and confusion, noticed this am per staff, new compared to yesterday. On exam resps are labored. ABG and CT ordered. CXR today:    Significant progression of opacification of the right hemithorax, which is   now complete.  This likely represent progressed pleural effusion and   atelectasis. - will notify pulmonologist.       Per IR - paracentesis done today:    SPECIMENS: 6900cc milky yellow ascitic fluid removed, 100cc sent to lab    Will start on empiric abx for possible SBP. Appreciate cardiology consult, Lasix drip ordered. Critically ill today, will transfer to ICU.

## 2022-05-05 NOTE — PROGRESS NOTES
See MAR for new orders for magnesium replacement and sodium phosphate replacement. Pharmacy notified to dose for phos 1.1 and Magnesium replacement currently infusing.

## 2022-05-05 NOTE — CARE COORDINATION
Patient transferring to ICU  (change in condition/ escalation of care). Had paracentesis this AM - 6900 ml out.  Huyen Bruner RN

## 2022-05-06 ENCOUNTER — APPOINTMENT (OUTPATIENT)
Dept: GENERAL RADIOLOGY | Age: 77
DRG: 432 | End: 2022-05-06
Payer: MEDICARE

## 2022-05-06 ENCOUNTER — APPOINTMENT (OUTPATIENT)
Dept: ULTRASOUND IMAGING | Age: 77
DRG: 432 | End: 2022-05-06
Payer: MEDICARE

## 2022-05-06 LAB
ALBUMIN FLUID: 1.2 GM/DL
AMMONIA: 45 UMOL/L (ref 16–60)
ANION GAP SERPL CALCULATED.3IONS-SCNC: 10 MMOL/L (ref 4–16)
BASE EXCESS MIXED: 11.2 (ref 0–1.2)
BUN BLDV-MCNC: 19 MG/DL (ref 6–23)
CALCIUM SERPL-MCNC: 9.1 MG/DL (ref 8.3–10.6)
CARBON MONOXIDE, BLOOD: 2 % (ref 0–5)
CHLORIDE BLD-SCNC: 99 MMOL/L (ref 99–110)
CO2 CONTENT: 34.9 MMOL/L (ref 19–24)
CO2: 29 MMOL/L (ref 21–32)
COMMENT: ABNORMAL
CREAT SERPL-MCNC: 1.1 MG/DL (ref 0.9–1.3)
CULTURE: ABNORMAL
FLUID TYPE: NORMAL INDEX
GFR AFRICAN AMERICAN: >60 ML/MIN/1.73M2
GFR NON-AFRICAN AMERICAN: >60 ML/MIN/1.73M2
GLUCOSE BLD-MCNC: 123 MG/DL (ref 70–99)
GLUCOSE BLD-MCNC: 94 MG/DL (ref 70–99)
GLUCOSE BLD-MCNC: 99 MG/DL (ref 70–99)
GLUCOSE, FLUID: 114 MG/DL
GRAM SMEAR: ABNORMAL
HCO3 ARTERIAL: 33.8 MMOL/L (ref 18–23)
INR BLD: 1.18 INDEX
LACTATE DEHYDROGENASE, FLUID: 85 IU/L
LEGIONELLA URINARY AG: NEGATIVE
LYMPHOCYTES, BODY FLUID: 74 %
Lab: ABNORMAL
MAGNESIUM: 1.9 MG/DL (ref 1.8–2.4)
MESOTHELIAL FLUID: 1 /100 WBC
METHEMOGLOBIN ARTERIAL: 1.5 %
MONOCYTE, FLUID: 15 %
NEUTROPHIL, FLUID: 11 %
O2 SATURATION: 96.6 % (ref 96–97)
PCO2 ARTERIAL: 36 MMHG (ref 32–45)
PH BLOOD: 7.58 (ref 7.34–7.45)
PH FLUID: 8
PHOSPHORUS: 1.4 MG/DL (ref 2.5–4.9)
PO2 ARTERIAL: 141 MMHG (ref 75–100)
POTASSIUM SERPL-SCNC: 3.2 MMOL/L (ref 3.5–5.1)
PROTEIN FLUID: 3.4 GM/DL
PROTHROMBIN TIME: 15.2 SECONDS (ref 11.7–14.5)
RBC FLUID: 3000 /CU MM
SODIUM BLD-SCNC: 138 MMOL/L (ref 135–145)
SOURCE FLUID: NORMAL
SPECIMEN: ABNORMAL
STREP PNEUMONIAE ANTIGEN: NORMAL
WBC FLUID: 305 /CU MM

## 2022-05-06 PROCEDURE — 80061 LIPID PANEL: CPT

## 2022-05-06 PROCEDURE — 36600 WITHDRAWAL OF ARTERIAL BLOOD: CPT

## 2022-05-06 PROCEDURE — 6370000000 HC RX 637 (ALT 250 FOR IP): Performed by: SPECIALIST

## 2022-05-06 PROCEDURE — 2580000003 HC RX 258: Performed by: INTERNAL MEDICINE

## 2022-05-06 PROCEDURE — 94003 VENT MGMT INPAT SUBQ DAY: CPT

## 2022-05-06 PROCEDURE — 99291 CRITICAL CARE FIRST HOUR: CPT | Performed by: INTERNAL MEDICINE

## 2022-05-06 PROCEDURE — 89051 BODY FLUID CELL COUNT: CPT

## 2022-05-06 PROCEDURE — 2580000003 HC RX 258: Performed by: NURSE PRACTITIONER

## 2022-05-06 PROCEDURE — 6360000002 HC RX W HCPCS: Performed by: NURSE PRACTITIONER

## 2022-05-06 PROCEDURE — 71045 X-RAY EXAM CHEST 1 VIEW: CPT

## 2022-05-06 PROCEDURE — 80048 BASIC METABOLIC PNL TOTAL CA: CPT

## 2022-05-06 PROCEDURE — 2500000003 HC RX 250 WO HCPCS: Performed by: INTERNAL MEDICINE

## 2022-05-06 PROCEDURE — 99223 1ST HOSP IP/OBS HIGH 75: CPT | Performed by: INTERNAL MEDICINE

## 2022-05-06 PROCEDURE — 6360000002 HC RX W HCPCS: Performed by: INTERNAL MEDICINE

## 2022-05-06 PROCEDURE — 82140 ASSAY OF AMMONIA: CPT

## 2022-05-06 PROCEDURE — A4216 STERILE WATER/SALINE, 10 ML: HCPCS | Performed by: INTERNAL MEDICINE

## 2022-05-06 PROCEDURE — 83615 LACTATE (LD) (LDH) ENZYME: CPT

## 2022-05-06 PROCEDURE — 87205 SMEAR GRAM STAIN: CPT

## 2022-05-06 PROCEDURE — 6370000000 HC RX 637 (ALT 250 FOR IP): Performed by: INTERNAL MEDICINE

## 2022-05-06 PROCEDURE — 2580000003 HC RX 258: Performed by: SPECIALIST

## 2022-05-06 PROCEDURE — 84100 ASSAY OF PHOSPHORUS: CPT

## 2022-05-06 PROCEDURE — 82042 OTHER SOURCE ALBUMIN QUAN EA: CPT

## 2022-05-06 PROCEDURE — 82962 GLUCOSE BLOOD TEST: CPT

## 2022-05-06 PROCEDURE — 94640 AIRWAY INHALATION TREATMENT: CPT

## 2022-05-06 PROCEDURE — 82945 GLUCOSE OTHER FLUID: CPT

## 2022-05-06 PROCEDURE — 84157 ASSAY OF PROTEIN OTHER: CPT

## 2022-05-06 PROCEDURE — APPSS60 APP SPLIT SHARED TIME 46-60 MINUTES: Performed by: NURSE PRACTITIONER

## 2022-05-06 PROCEDURE — 2700000000 HC OXYGEN THERAPY PER DAY

## 2022-05-06 PROCEDURE — 83986 ASSAY PH BODY FLUID NOS: CPT

## 2022-05-06 PROCEDURE — 99233 SBSQ HOSP IP/OBS HIGH 50: CPT | Performed by: INTERNAL MEDICINE

## 2022-05-06 PROCEDURE — 0W993ZX DRAINAGE OF RIGHT PLEURAL CAVITY, PERCUTANEOUS APPROACH, DIAGNOSTIC: ICD-10-PCS | Performed by: INTERNAL MEDICINE

## 2022-05-06 PROCEDURE — 82803 BLOOD GASES ANY COMBINATION: CPT

## 2022-05-06 PROCEDURE — 2000000000 HC ICU R&B

## 2022-05-06 PROCEDURE — 94761 N-INVAS EAR/PLS OXIMETRY MLT: CPT

## 2022-05-06 PROCEDURE — 32555 ASPIRATE PLEURA W/ IMAGING: CPT | Performed by: INTERNAL MEDICINE

## 2022-05-06 PROCEDURE — 87070 CULTURE OTHR SPECIMN AEROBIC: CPT

## 2022-05-06 PROCEDURE — 85610 PROTHROMBIN TIME: CPT

## 2022-05-06 PROCEDURE — 99232 SBSQ HOSP IP/OBS MODERATE 35: CPT | Performed by: SPECIALIST

## 2022-05-06 PROCEDURE — C1729 CATH, DRAINAGE: HCPCS

## 2022-05-06 PROCEDURE — 83735 ASSAY OF MAGNESIUM: CPT

## 2022-05-06 PROCEDURE — 6370000000 HC RX 637 (ALT 250 FOR IP): Performed by: NURSE PRACTITIONER

## 2022-05-06 RX ORDER — POTASSIUM CHLORIDE 29.8 MG/ML
20 INJECTION INTRAVENOUS ONCE
Status: COMPLETED | OUTPATIENT
Start: 2022-05-06 | End: 2022-05-06

## 2022-05-06 RX ORDER — POTASSIUM CHLORIDE 29.8 MG/ML
20 INJECTION INTRAVENOUS
Status: DISCONTINUED | OUTPATIENT
Start: 2022-05-06 | End: 2022-05-06

## 2022-05-06 RX ORDER — LIDOCAINE HYDROCHLORIDE 10 MG/ML
20 INJECTION, SOLUTION EPIDURAL; INFILTRATION; INTRACAUDAL; PERINEURAL ONCE
Status: COMPLETED | OUTPATIENT
Start: 2022-05-06 | End: 2022-05-06

## 2022-05-06 RX ADMIN — POTASSIUM CHLORIDE 20 MEQ: 29.8 INJECTION, SOLUTION INTRAVENOUS at 12:07

## 2022-05-06 RX ADMIN — CEFEPIME HYDROCHLORIDE 2000 MG: 2 INJECTION, POWDER, FOR SOLUTION INTRAVENOUS at 21:16

## 2022-05-06 RX ADMIN — Medication 100 MCG/HR: at 04:24

## 2022-05-06 RX ADMIN — ALBUTEROL SULFATE 4 PUFF: 90 AEROSOL, METERED RESPIRATORY (INHALATION) at 07:46

## 2022-05-06 RX ADMIN — SODIUM CHLORIDE, PRESERVATIVE FREE 10 ML: 5 INJECTION INTRAVENOUS at 08:49

## 2022-05-06 RX ADMIN — SODIUM CHLORIDE, PRESERVATIVE FREE 10 ML: 5 INJECTION INTRAVENOUS at 21:17

## 2022-05-06 RX ADMIN — CEFEPIME HYDROCHLORIDE 2000 MG: 2 INJECTION, POWDER, FOR SOLUTION INTRAVENOUS at 13:38

## 2022-05-06 RX ADMIN — ALBUTEROL SULFATE 4 PUFF: 90 AEROSOL, METERED RESPIRATORY (INHALATION) at 03:32

## 2022-05-06 RX ADMIN — LACTULOSE 20 G: 10 SOLUTION ORAL at 21:14

## 2022-05-06 RX ADMIN — VANCOMYCIN HYDROCHLORIDE 1750 MG: 10 INJECTION, POWDER, LYOPHILIZED, FOR SOLUTION INTRAVENOUS at 11:00

## 2022-05-06 RX ADMIN — ALBUTEROL SULFATE 4 PUFF: 90 AEROSOL, METERED RESPIRATORY (INHALATION) at 20:46

## 2022-05-06 RX ADMIN — SODIUM CHLORIDE, PRESERVATIVE FREE 20 MG: 5 INJECTION INTRAVENOUS at 21:14

## 2022-05-06 RX ADMIN — Medication 100 MCG/HR: at 13:54

## 2022-05-06 RX ADMIN — PROPOFOL 40 MCG/KG/MIN: 10 INJECTION, EMULSION INTRAVENOUS at 15:24

## 2022-05-06 RX ADMIN — ALBUTEROL SULFATE 4 PUFF: 90 AEROSOL, METERED RESPIRATORY (INHALATION) at 15:28

## 2022-05-06 RX ADMIN — RIFAXIMIN 550 MG: 550 TABLET ORAL at 08:48

## 2022-05-06 RX ADMIN — LEVOTHYROXINE SODIUM 50 MCG: 25 TABLET ORAL at 06:14

## 2022-05-06 RX ADMIN — PROPOFOL 35 MCG/KG/MIN: 10 INJECTION, EMULSION INTRAVENOUS at 18:51

## 2022-05-06 RX ADMIN — PROPOFOL 35 MCG/KG/MIN: 10 INJECTION, EMULSION INTRAVENOUS at 22:59

## 2022-05-06 RX ADMIN — CEFEPIME HYDROCHLORIDE 2000 MG: 2 INJECTION, POWDER, FOR SOLUTION INTRAVENOUS at 04:26

## 2022-05-06 RX ADMIN — DULOXETINE 60 MG: 30 CAPSULE, DELAYED RELEASE ORAL at 21:14

## 2022-05-06 RX ADMIN — RIFAXIMIN 550 MG: 550 TABLET ORAL at 21:14

## 2022-05-06 RX ADMIN — PROPOFOL 35 MCG/KG/MIN: 10 INJECTION, EMULSION INTRAVENOUS at 02:22

## 2022-05-06 RX ADMIN — MAGNESIUM OXIDE 400 MG (241.3 MG MAGNESIUM) TABLET 200 MG: TABLET at 08:48

## 2022-05-06 RX ADMIN — LIDOCAINE HYDROCHLORIDE 20 ML: 10 INJECTION, SOLUTION EPIDURAL; INFILTRATION; INTRACAUDAL; PERINEURAL at 12:41

## 2022-05-06 RX ADMIN — LACTULOSE 20 G: 10 SOLUTION ORAL at 05:27

## 2022-05-06 RX ADMIN — PROPOFOL 10 MCG/KG/MIN: 10 INJECTION, EMULSION INTRAVENOUS at 06:05

## 2022-05-06 RX ADMIN — FUROSEMIDE 5 MG/HR: 100 INJECTION, SOLUTION INTRAMUSCULAR; INTRAVENOUS at 05:44

## 2022-05-06 RX ADMIN — ALBUTEROL SULFATE 4 PUFF: 90 AEROSOL, METERED RESPIRATORY (INHALATION) at 11:29

## 2022-05-06 RX ADMIN — MAGNESIUM OXIDE 400 MG (241.3 MG MAGNESIUM) TABLET 200 MG: TABLET at 21:14

## 2022-05-06 RX ADMIN — LACTULOSE: 10 SOLUTION ORAL at 17:33

## 2022-05-06 RX ADMIN — POTASSIUM CHLORIDE 20 MEQ: 29.8 INJECTION, SOLUTION INTRAVENOUS at 11:22

## 2022-05-06 RX ADMIN — PROPOFOL 40 MCG/KG/MIN: 10 INJECTION, EMULSION INTRAVENOUS at 08:42

## 2022-05-06 RX ADMIN — SPIRONOLACTONE 100 MG: 50 TABLET ORAL at 08:48

## 2022-05-06 RX ADMIN — PROPOFOL 40 MCG/KG/MIN: 10 INJECTION, EMULSION INTRAVENOUS at 12:06

## 2022-05-06 RX ADMIN — CHLORHEXIDINE GLUCONATE 0.12% ORAL RINSE 15 ML: 1.2 LIQUID ORAL at 08:47

## 2022-05-06 RX ADMIN — LACTULOSE 20 G: 10 SOLUTION ORAL at 13:34

## 2022-05-06 RX ADMIN — SODIUM PHOSPHATE, MONOBASIC, MONOHYDRATE 15 MMOL: 276; 142 INJECTION, SOLUTION INTRAVENOUS at 08:45

## 2022-05-06 RX ADMIN — CHLORHEXIDINE GLUCONATE 0.12% ORAL RINSE 15 ML: 1.2 LIQUID ORAL at 21:14

## 2022-05-06 RX ADMIN — SODIUM CHLORIDE, PRESERVATIVE FREE 20 MG: 5 INJECTION INTRAVENOUS at 08:48

## 2022-05-06 ASSESSMENT — PULMONARY FUNCTION TESTS
PIF_VALUE: 33
PIF_VALUE: 30
PIF_VALUE: 39
PIF_VALUE: 37
PIF_VALUE: 24
PIF_VALUE: 30
PIF_VALUE: 36
PIF_VALUE: 33
PIF_VALUE: 26
PIF_VALUE: 30
PIF_VALUE: 33
PIF_VALUE: 34
PIF_VALUE: 31
PIF_VALUE: 28
PIF_VALUE: 31
PIF_VALUE: 25
PIF_VALUE: 27
PIF_VALUE: 27
PIF_VALUE: 33
PIF_VALUE: 31
PIF_VALUE: 33
PIF_VALUE: 30
PIF_VALUE: 27
PIF_VALUE: 30
PIF_VALUE: 32
PIF_VALUE: 26
PIF_VALUE: 27
PIF_VALUE: 23
PIF_VALUE: 34
PIF_VALUE: 29
PIF_VALUE: 30
PIF_VALUE: 27
PIF_VALUE: 30
PIF_VALUE: 25
PIF_VALUE: 31
PIF_VALUE: 27
PIF_VALUE: 28
PIF_VALUE: 30
PIF_VALUE: 33
PIF_VALUE: 28
PIF_VALUE: 26
PIF_VALUE: 28
PIF_VALUE: 32
PIF_VALUE: 28
PIF_VALUE: 27
PIF_VALUE: 30
PIF_VALUE: 32
PIF_VALUE: 29
PIF_VALUE: 29
PIF_VALUE: 31
PIF_VALUE: 28
PIF_VALUE: 28
PIF_VALUE: 24
PIF_VALUE: 30
PIF_VALUE: 25
PIF_VALUE: 28
PIF_VALUE: 24
PIF_VALUE: 28
PIF_VALUE: 26
PIF_VALUE: 24
PIF_VALUE: 28
PIF_VALUE: 29
PIF_VALUE: 27

## 2022-05-06 NOTE — PROGRESS NOTES
RN s/w Dr. Kathe Harper at bedside this AM. RN informed MD that patient has not had BM despite lactulose and Xifaxan administration. Per MD, will plan for lactulose enemas if PO medications do not produce BM by this afternoon.

## 2022-05-06 NOTE — PROGRESS NOTES
Dr. Patel Favors at bedside. Plans for thoracentesis at 1200. Family at bedside and updated on plan.

## 2022-05-06 NOTE — PROGRESS NOTES
Today's plan: Patient had right-sided thoracentesis today by pulmonary and, had paracentesis yesterday, patient is intubated hemodynamically stable, continue Lasix drip      Admit Date:  5/2/2022    Subjective:. Debated and sedated      Chief complaints on admission  Chief Complaint   Patient presents with    Shortness of Breath     reports difficulty breathing for the past 5-6 days and extremely tired         History of present illness:Boby is a 78 y. o.year old who  presents with had concerns including Shortness of Breath (reports difficulty breathing for the past 5-6 days and extremely tired). Past medical history:    has a past medical history of Anxiety, Cirrhosis, alcoholic ([address] GERD (gastroesophageal reflux disease), GERD (gastroesophageal reflux disease), Hyperlipidemia, Hypertension, Portal hypertension ([address] Pulmonary nodules, Sleep apnea, and SOB (shortness of breath). Past surgical history:   has a past surgical history that includes Cholecystectomy; Vasectomy; Colonoscopy; Endoscopy, colon, diagnostic; Foot surgery; and Upper gastrointestinal endoscopy (N/A, 1/19/2022). Social History:   reports that he quit smoking about 46 years ago. He has a 14.00 pack-year smoking history. He has never used smokeless tobacco. He reports previous alcohol use. He reports that he does not use drugs. Family history:  family history includes Diabetes in an other family member; Hypertension in his brother.     Allergies   Allergen Reactions    Lisinopril Swelling     Tongue swelling      Zetia [Ezetimibe] Swelling     Facial swelling    Atorvastatin     Codeine Other (See Comments)     Blood pressure drops    Hydrochlorothiazide     Hydroxyzine      Fatigue and depressed    Lexapro [Escitalopram Oxalate]      Increased depression    Pravastatin          Objective:   BP (!) 100/47   Pulse 76   Temp 97.9 °F (36.6 °C) (Rectal)   Resp 16   Ht 5' 7.99\" (1.727 m)   Wt 285 lb (129.3 kg)   SpO2 99% BMI 43.34 kg/m²       Intake/Output Summary (Last 24 hours) at 5/6/2022 1705  Last data filed at 5/6/2022 1645  Gross per 24 hour   Intake 2858.01 ml   Output 2595 ml   Net 263.01 ml       T     Physical Exam:  Constitutional:  Well developed, Well nourished, No acute distress, Non-toxic appearance. HENT:  Normocephalic, Atraumatic, Bilateral external ears normal, Oropharynx moist, No oral exudates, Nose normal. Neck- Normal range of motion, No tenderness, Supple, No stridor. Eyes:  PERRL, EOMI, Conjunctiva normal, No discharge. Respiratory:  Normal breath sounds, No respiratory distress, No wheezing, No chest tenderness. ,no use of accessory muscles, diaphragm movement is normal  Cardiovascular: (PMI) apex non displaced,no lifts no thrills, no s3,no s4, Normal heart rate, Normal rhythm, No murmurs, No rubs, No gallops. Carotid arteries pulse and amplitude are normal no bruit, no abdominal bruit noted ( normal abdominal aorta ausculation), femoral arteries pulse and amplitude are normal no bruit, pedal pulses are normal  GI:  Bowel sounds normal, Soft, No tenderness, No masses, No pulsatile masses. : External genitalia appear normal, No masses or lesions. No discharge. No CVA tenderness. Musculoskeletal:  Intact distal pulses, No edema, No tenderness, No cyanosis, No clubbing. Good range of motion in all major joints. No tenderness to palpation or major deformities noted. Back- No tenderness. Integument:  Warm, Dry, No erythema, No rash. Lymphatic:  No lymphadenopathy noted. Neurologic: Intubated  and sedated.      Medications:    lactulose enema   Rectal Q6H    cefepime  2,000 mg IntraVENous Q8H    vancomycin  1,750 mg IntraVENous Q24H    albuterol sulfate HFA  4 puff Inhalation Q4H    chlorhexidine  15 mL Mouth/Throat BID    famotidine (PEPCID) injection  20 mg IntraVENous BID    lidocaine PF  5 mL IntraDERmal Once    sodium chloride flush  5-40 mL IntraVENous 2 times per day    insulin regular  0-18 Units SubCUTAneous Q6H    sodium chloride flush  5-40 mL IntraVENous 2 times per day    DULoxetine  60 mg Oral Daily    lactulose  20 g Oral TID    levothyroxine  50 mcg Oral Daily    magnesium oxide  200 mg Oral BID    nadolol  40 mg Oral Daily    rifAXIMin  550 mg Oral BID    spironolactone  100 mg Oral Daily    sodium chloride flush  5-40 mL IntraVENous 2 times per day    [Held by provider] enoxaparin  30 mg SubCUTAneous BID      furosemide (LASIX) 1mg/ml infusion 5 mg/hr (05/06/22 1338)    propofol 35 mcg/kg/min (05/06/22 1657)    fentaNYL 100 mcg/hr (05/06/22 1354)    sodium chloride 10 mL/hr at 05/06/22 1213    norepinephrine 5 mcg/min (05/06/22 1210)    sodium chloride      sodium chloride       sodium chloride flush, sodium chloride, sodium phosphate IVPB **OR** sodium phosphate IVPB, sodium chloride flush, sodium chloride, albuterol sulfate HFA, sodium chloride flush, sodium chloride, nicotine polacrilex, acetaminophen **OR** acetaminophen, ondansetron, ALPRAZolam    Lab Data:  CBC:   Recent Labs     05/04/22  1142 05/05/22  1605   WBC 7.6 9.1   HGB 13.0* 11.0*   HCT 41.3* 33.8*   .0* 100.0    159     BMP:   Recent Labs     05/05/22  0336 05/05/22  1605 05/06/22  0440    135 138   K 4.1 3.8 3.2*    98* 99   CO2 30 29 29   PHOS  --  1.1* 1.4*   BUN 15 17 19   CREATININE 0.9 1.1 1.1     LIVER PROFILE:   Recent Labs     05/05/22  1605   AST 34   ALT 18   BILITOT 1.1*   ALKPHOS 90     PT/INR:   Recent Labs     05/04/22  1142 05/06/22  0440   PROTIME 14.6* 15.2*   INR 1.13 1.18     APTT: No results for input(s): APTT in the last 72 hours. BNP:  No results for input(s): BNP in the last 72 hours. TROPONIN: @TROPONINI:3@      Assessment:  78 y. o.year old who is admitted for          Plan:  1.  Recurrent pleural effusion most likely from fluid overload with cirrhosis and diastolic heart failure, patient already had a right lung thoracentesis few days ago and had a present is also recently, will start Lasix drip and will get ABG and ammonia level for his lethargy, will recommend to pulmonary to discuss about whether he needs a pleurodesis for his recurrent pleural effusion  2. Altered mental status recommend to check ABG and ammonia level and use lactulose if ammonia is high  3. Cirrhosis as per GI  4. GERD continue Protonix  All labs, medications and tests reviewed, continue all other medications of all above medical condition listed as is.       Ratna Grimaldo MD, MD 5/6/2022 5:05 PM

## 2022-05-06 NOTE — PROGRESS NOTES
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

## 2022-05-06 NOTE — PROGRESS NOTES
Rectal tube inserted per Dr. Zahra Stewart for q6h Lactulose enemas per Dr. Suraj Miller. First dose given at 1730 and tube clamped.

## 2022-05-06 NOTE — PROGRESS NOTES
Progress Note( Dr. Terry Alfonso)  5/6/2022  Subjective:   Admit Date: 5/2/2022  PCP: Yohana Britt MD    Admitted For : Shortness of breath with right-sided pleural effusion    Consulted For:  Better control of blood glucose      Interval History: Patient is sedated, intubated and ventilator also started on tube feeding    . Intake/Output Summary (Last 24 hours) at 5/6/2022 1702  Last data filed at 5/6/2022 1645  Gross per 24 hour   Intake 2858.01 ml   Output 2595 ml   Net 263.01 ml       DATA    CBC:   Recent Labs     05/04/22  1142 05/05/22  1605   WBC 7.6 9.1   HGB 13.0* 11.0*    159    CMP:  Recent Labs     05/05/22  0336 05/05/22  1605 05/06/22  0440    135 138   K 4.1 3.8 3.2*    98* 99   CO2 30 29 29   BUN 15 17 19   CREATININE 0.9 1.1 1.1   CALCIUM 9.0 8.8 9.1   PROT  --  6.3*  --    LABALBU  --  2.8*  --    BILITOT  --  1.1*  --    ALKPHOS  --  90  --    AST  --  34  --    ALT  --  18  --      Lipids:   Lab Results   Component Value Date    CHOL 230 10/14/2019    HDL 62 10/14/2019    TRIG 135 10/14/2019     Glucose:  Recent Labs     05/05/22  1030 05/05/22  2325 05/06/22  1132   POCGLU 107* 102* 123*     GifxctfekmY0H:  Lab Results   Component Value Date    LABA1C 6.1 08/09/2021     High Sensitivity TSH:   Lab Results   Component Value Date    TSHHS 0.900 05/05/2022     Free T3: No results found for: FT3  Free T4:  Lab Results   Component Value Date    T4FREE 0.78 03/29/2022       XR CHEST PORTABLE   Final Result   Improved aeration of the right lung field compared to 05/04/2022 with   persistent large areas of increased opacification right apical and right   mid-basilar regions consistent with underlying pulmonary consolidation,   mass/neoplasm, residual pleural fluid or thickening or asymmetric elevation   of left hemidiaphragm.   No pneumothorax or procedure related complication   suggested      Consolidating infiltrate and/or moderate-sized pleural effusion left basilar region. Stable borderline cardiomegaly, satisfactory position of nasogastric and   endotracheal tubes, soft tissue density right paratracheal region consistent   with mass/neoplasm, lymphadenopathy or area pulmonary consolidation measuring   5.5 cm in greatest cephalocaudal dimension. US CHEST INCLUDING MEDIASTINUM   Final Result   Ultrasound guidance provided for right thoracentesis. XR CHEST PORTABLE   Final Result   Near complete opacification of the right hemithorax with mildly improved   aeration. Mildly increased left-sided interstitial opacities favoring interstitial   edema. Unchanged position of support devices. CT ABDOMEN PELVIS WO CONTRAST Additional Contrast? None   Final Result   Complete opacification of the right hemithorax with a large right pleural   effusion, with compressive atelectasis of the right lung, and shift of the   mediastinum to the left. Minor atelectasis noted in the left lung base. Large amount of ascites. Evidence of the cirrhosis. Stable left renal cyst.      Stable mesenteric and retroperitoneal lymphadenopathy without change since   2019. XR ABDOMEN FOR NG/OG/NE TUBE PLACEMENT   Final Result   Satisfactory position lines and catheters. Right sided chest opacification. Question extrinsic to the patient         [address]   Final Result   Large right pleural effusion. CT HEAD WO CONTRAST   Final Result   1. Motion compromised study but no acute intracranial abnormality. I would   suggest follow-up if symptoms persist.         IR US GUIDED PARACENTESIS   Final Result   Successful ultrasound-guided paracentesis with specimen sent for laboratory   evaluation. US DUP ABD PEL RETRO SCROT COMPLETE   Preliminary Result   1. Portal and hepatic veins appear grossly patent. 2. Cirrhosis without focal liver lesion demonstrated. 3. Small volume ascites.          XR CHEST PORTABLE   Final Result   Significant progression of opacification of the right hemithorax, which is   now complete. This likely represent progressed pleural effusion and   atelectasis. Likely trace left pleural effusion, similar to prior         XR CHEST PORTABLE   Final Result   1. Status post right thoracentesis with new partial aeration of the right   lung. Large pleural effusion persists. 2. Small left pleural effusion. IR GUIDED THORACENTESIS PLEURAL   Final Result   Successful ultrasound guided right thoracentesis with specimen sent for   laboratory evaluation. .         XR CHEST PORTABLE   Final Result   Complete opacification of the right hemithorax likely due to a large right   pleural effusion and atelectasis.          CTA CHEST ABDOMEN PELVIS W CONTRAST    (Results Pending)   XR CHEST PORTABLE    (Results Pending)        Scheduled Medicines   Medications:    lactulose enema   Rectal Q6H    cefepime  2,000 mg IntraVENous Q8H    vancomycin  1,750 mg IntraVENous Q24H    albuterol sulfate HFA  4 puff Inhalation Q4H    chlorhexidine  15 mL Mouth/Throat BID    famotidine (PEPCID) injection  20 mg IntraVENous BID    lidocaine PF  5 mL IntraDERmal Once    sodium chloride flush  5-40 mL IntraVENous 2 times per day    insulin regular  0-18 Units SubCUTAneous Q6H    sodium chloride flush  5-40 mL IntraVENous 2 times per day    DULoxetine  60 mg Oral Daily    lactulose  20 g Oral TID    levothyroxine  50 mcg Oral Daily    magnesium oxide  200 mg Oral BID    nadolol  40 mg Oral Daily    rifAXIMin  550 mg Oral BID    spironolactone  100 mg Oral Daily    sodium chloride flush  5-40 mL IntraVENous 2 times per day    [Held by provider] enoxaparin  30 mg SubCUTAneous BID      Infusions:    furosemide (LASIX) 1mg/ml infusion 5 mg/hr (05/06/22 1338)    propofol 35 mcg/kg/min (05/06/22 1657)    fentaNYL 100 mcg/hr (05/06/22 1354)    sodium chloride 10 mL/hr at 05/06/22 1213    norepinephrine 5 mcg/min (05/06/22 1210)    sodium chloride      sodium chloride           Objective:   Vitals: BP (!) 112/51   Pulse 83   Temp 97.9 °F (36.6 °C) (Rectal)   Resp 16   Ht 5' 7.99\" (1.727 m)   Wt 285 lb (129.3 kg)   SpO2 100%   BMI 43.34 kg/m²   General appearance: Patient is sedated, intubated and on ventilator also NG tube feeding  Neck: no JVD or bruit  Thyroid : Normal lobes   Lungs: Has Vesicular Breath sounds related diminished breath sound right-sided pleural effusion was asked  Heart:  regular rate and rhythm  Abdomen: soft, non-tender; bowel sounds normal; no masses,  no organomegaly  Musculoskeletal: Normal  Extremities: extremities normal, , no edema  Neurologic:  Patient is sedated, intubated and on ventilator . Assessment:     Patient Active Problem List:     Shortness of breath     History of colonic polyps     Alcoholic cirrhosis of liver with ascites (HCC)-Dr Jacques     Mixed hyperlipidemia     Hypertension     History of alcohol abuse     Gastroesophageal reflux disease without esophagitis     Anxiety     Pulmonary nodules     Hyperglycemia     Acquired hypothyroidism     Class 3 severe obesity with body mass index (BMI) of 40.0 to 44.9 in adult [facility])     Bilateral hearing loss     Increased ammonia level     JENNIFER (obstructive sleep apnea)     Portal hypertension (HCC)     SOB (shortness of breath)     Cirrhosis of liver with ascites (HCC)     Secondary esophageal varices without bleeding (HCC)     Gastric polyps     Hearing loss     Sepsis (HCC)     Hepatic encephalopathy (HCC)     Pleural effusion     Acute on chronic respiratory failure with hypoxemia (HCC)     Acute respiratory failure ([facility] [address]      Plan:     1. Reviewed POC blood glucose . Labs and X ray results   2. Reviewed Current Medicines   3. On correction bolus of regular insulin  4. Monitor Blood glucose frequently   5. Modified  the dose of Insulin/ other medicines as needed   6. Will follow     .      Karo Chinchilla MD, MD

## 2022-05-06 NOTE — CONSULTS
Endocrinology   Consult Note  5/2/2022 12:00 PM     Primary Care provider: Lanna Dancer, MD     Referring physician:  Jaren Ortiz MD     Dear Doctor Sunday Ailyn    Thank You for the Consult     Pt. Was Admitted for : Shortness of breath with right-sided pleural effusion    Reason for Consult: Better control of blood glucose      History Obtained From:  EMR patient intubated and on ventilator      HISTORY OF PRESENT ILLNESS:                The patient is a 78 y.o. male with significant past medical history of alcoholic cirrhosis of liver, GERD, hyperlipidemia, hypertension, portal hypertension, pulmonary nodule and sleep apnea hypothyroidism presented with shortness of breath. Chest x-ray showed patient has right-sided hemothorax very hypoxic. Patient was underwent right-sided thoracentesis and also intubated and placed on ventilator also started on tube feeding. I was  consulted for better control of blood glucose. ROS:   Pt's ROS done in detail. Abnormal ROS are noted in Medical and Surgical History Section below: Other Medical History:        Diagnosis Date    Anxiety     Cirrhosis, alcoholic ([address]     GERD (gastroesophageal reflux disease) 2000    GERD (gastroesophageal reflux disease)     Hyperlipidemia     Hypertension     Portal hypertension (HCC)     Pulmonary nodules     Sleep apnea     SOB (shortness of breath)      Surgical History:        Procedure Laterality Date    CHOLECYSTECTOMY      COLONOSCOPY      ENDOSCOPY, COLON, DIAGNOSTIC      FOOT SURGERY      needle removed,not sure which foot, per wife.     UPPER GASTROINTESTINAL ENDOSCOPY N/A 1/19/2022    EGD BIOPSY performed by Dylon Mcpherson MD at [facility] [address]         Allergies:  Lisinopril, Zetia [ezetimibe], Atorvastatin, Codeine, Hydrochlorothiazide, Hydroxyzine, Lexapro [escitalopram oxalate], and Pravastatin    Family History:       Problem Relation Age of Onset    Hypertension Brother    De Diabetes Other      REVIEW OF SYSTEMS:  Review of System Done as noted above     PHYSICAL EXAM:      Vitals:    BP (!) 113/47   Pulse 78   Temp 99.3 °F (37.4 °C) (Rectal)   Resp 16   Ht 5' 7.99\" (1.727 m)   Wt 285 lb (129.3 kg)   SpO2 100%   BMI 43.34 kg/m²     CONSTITUTIONAL:  awake, alert, cooperative, appears stated age   EYES:  vision intact Fundoscopic Exam not performed   ENT:Normal  NECK:  Supple, No JVD. Thyroid Exam:Normal   LUNGS:  Has Vesicular Breath Sounds,   CARDIOVASCULAR:  Normal apical impulse, regular rate and rhythm, normal S1 and S2, no S3 or S4, and has no  murmur   ABDOMEN:  No scars, normal bowel sounds, soft, non-distended, non-tender, no masses palpated, no hepatolienomegaly  Musculoskeletal: Normal  Extremities: Normal, peripheral pulses normal, , has no edema   NEUROLOGIC:  Awake, alert, oriented to name, place and time. Cranial nerves II-XII are grossly intact. Motor is  intact. Sensory is intact.  ,  and gait is normal.    DATA:    CBC:   Recent Labs     05/04/22  1142 05/05/22  1605   WBC 7.6 9.1   HGB 13.0* 11.0*    159    CMP:  Recent Labs     05/04/22  0109 05/05/22  0336 05/05/22  1605    139 135   K 4.1 4.1 3.8    100 98*   CO2 31 30 29   BUN 18 15 17   CREATININE 1.1 0.9 1.1   CALCIUM 9.1 9.0 8.8   PROT  --   --  6.3*   LABALBU  --   --  2.8*   BILITOT  --   --  1.1*   ALKPHOS  --   --  90   AST  --   --  34   ALT  --   --  18     Lipids:   Lab Results   Component Value Date    CHOL 230 10/14/2019    HDL 62 10/14/2019    TRIG 135 10/14/2019     Glucose:   Recent Labs     05/05/22  1030   POCGLU 107*     Hemoglobin A1C:   Lab Results   Component Value Date    LABA1C 6.1 08/09/2021     Free T4:   Lab Results   Component Value Date    T4FREE 0.78 03/29/2022     Free T3: No results found for: FT3  TSH High Sensitivity:   Lab Results   Component Value Date    TSHHS 0.900 05/05/2022       CT ABDOMEN PELVIS WO CONTRAST Additional Contrast? None   Final Result   Complete opacification of the right hemithorax with a large right pleural   effusion, with compressive atelectasis of the right lung, and shift of the   mediastinum to the left. Minor atelectasis noted in the left lung base. Large amount of ascites. Evidence of the cirrhosis. Stable left renal cyst.      Stable mesenteric and retroperitoneal lymphadenopathy without change since   2019. XR ABDOMEN FOR NG/OG/NE TUBE PLACEMENT   Final Result   Satisfactory position lines and catheters. Right sided chest opacification. Question extrinsic to the patient         [address]   Final Result   Large right pleural effusion. CT HEAD WO CONTRAST   Final Result   1. Motion compromised study but no acute intracranial abnormality. I would   suggest follow-up if symptoms persist.         IR US GUIDED PARACENTESIS   Final Result   Successful ultrasound-guided paracentesis with specimen sent for laboratory   evaluation. US DUP ABD PEL RETRO SCROT COMPLETE   Preliminary Result   1. Portal and hepatic veins appear grossly patent. 2. Cirrhosis without focal liver lesion demonstrated. 3. Small volume ascites. XR CHEST PORTABLE   Final Result   Significant progression of opacification of the right hemithorax, which is   now complete. This likely represent progressed pleural effusion and   atelectasis. Likely trace left pleural effusion, similar to prior         XR CHEST PORTABLE   Final Result   1. Status post right thoracentesis with new partial aeration of the right   lung. Large pleural effusion persists. 2. Small left pleural effusion. IR GUIDED THORACENTESIS PLEURAL   Final Result   Successful ultrasound guided right thoracentesis with specimen sent for   laboratory evaluation. .         XR CHEST PORTABLE   Final Result   Complete opacification of the right hemithorax likely due to a large right   pleural effusion and atelectasis. CTA CHEST ABDOMEN PELVIS W CONTRAST    (Results Pending)   XR CHEST PORTABLE    (Results Pending)          Scheduled Medicines   Medications:    cefepime  2,000 mg IntraVENous Q8H    lidocaine  30 mL IntraDERmal Once    [START ON 5/6/2022] vancomycin  1,750 mg IntraVENous Q24H    albuterol sulfate HFA  4 puff Inhalation Q4H    chlorhexidine  15 mL Mouth/Throat BID    famotidine (PEPCID) injection  20 mg IntraVENous BID    lidocaine PF  5 mL IntraDERmal Once    sodium chloride flush  5-40 mL IntraVENous 2 times per day    insulin regular  0-18 Units SubCUTAneous Q6H    sodium chloride flush  5-40 mL IntraVENous 2 times per day    DULoxetine  60 mg Oral Daily    lactulose  20 g Oral TID    levothyroxine  50 mcg Oral Daily    magnesium oxide  200 mg Oral BID    nadolol  40 mg Oral Daily    rifAXIMin  550 mg Oral BID    spironolactone  100 mg Oral Daily    sodium chloride flush  5-40 mL IntraVENous 2 times per day    [Held by provider] enoxaparin  30 mg SubCUTAneous BID      Infusions:    furosemide (LASIX) 1mg/ml infusion 5 mg/hr (05/05/22 1700)    propofol 45 mcg/kg/min (05/05/22 2024)    fentaNYL 50 mcg/hr (05/05/22 1700)    sodium chloride Stopped (05/05/22 1545)    norepinephrine 5 mcg/min (05/05/22 1700)    sodium chloride      sodium chloride           IMPRESSION    Patient Active Problem List   Diagnosis    Shortness of breath    History of colonic polyps    Alcoholic cirrhosis of liver with ascites (HCC)-Dr Jacques    Mixed hyperlipidemia    Hypertension    History of alcohol abuse    Gastroesophageal reflux disease without esophagitis    Anxiety    Pulmonary nodules    Hyperglycemia    Acquired hypothyroidism    Class 3 severe obesity with body mass index (BMI) of 40.0 to 44.9 in [facility])    Bilateral hearing loss    Increased ammonia level    JENNIFER (obstructive sleep apnea)    Portal hypertension (HCC)    SOB (shortness of breath)    Cir[facility]osis of liver with ascites ([facility]    Secondary esophageal varices without bleeding (HCC)    Gastric polyps    Hearing loss    Sepsis (HCC)    Hepatic encephalopathy (HCC)    Pleural effusion    Acute on chronic respiratory failure with hypoxemia (HCC)    Acute respiratory failure (HCC)         RECOMMENDATIONS:      1. Reviewed POC blood glucose . Labs and X ray results   2. Reviewed Home and Current Medicines   3. Will Start On  Correction bolus Regular/ Insulin regime /  4. Monitor Blood glucose frequently   5. Modify  the dose of Insulin as needed        Will follow with you  Again thank you for sharing pt's care with me.      Truly yours,       Juma Russell MD

## 2022-05-06 NOTE — PROGRESS NOTES
Pulmonary and Critical Care  Progress Note      VITALS:  BP (!) 110/53   Pulse 66   Temp 97.7 °F (36.5 °C) (Rectal)   Resp 16   Ht 5' 7.99\" (1.727 m)   Wt 285 lb (129.3 kg)   SpO2 95%   BMI 43.34 kg/m²     Subjective:   CHIEF COMPLAINT :SOB     HPI:                The patient is on the vent and sedated. He was intubated for the AMS and agitation. He is on  40% fio2. He is hemodynamically stable, getting tube feeds    Objective:   PHYSICAL EXAM:    LUNGS:Decreased air entry right base  Abd-distended, BS+,NT  Ext- no pedal edema  CVS-s1s2, no murmurs      DATA:    CBC:  Recent Labs     05/04/22  1142 05/05/22  1605   WBC 7.6 9.1   RBC 4.05* 3.38*   HGB 13.0* 11.0*   HCT 41.3* 33.8*    159   .0* 100.0   MCH 32.1* 32.5*   MCHC 31.5* 32.5   RDW 14.5 14.3   SEGSPCT  --  82.7*      BMP:  Recent Labs     05/05/22  0336 05/05/22  1605 05/06/22  0440    135 138   K 4.1 3.8 3.2*    98* 99   CO2 30 29 29   BUN 15 17 19   CREATININE 0.9 1.1 1.1   CALCIUM 9.0 8.8 9.1   GLUCOSE 118* 214* 99      ABG:  Recent Labs     05/05/22  1030 05/05/22  1345 05/06/22  0600   PH 7.35 7.35 7.58*   PO2ART 41* 95 141*   DXT3UZJ 65.0* 63.0* 36.0   O2SAT 71.3* 94.9* 96.6     BNP  No results found for: BNP   D-Dimer:  Lab Results   Component Value Date    DDIMER 1606 (H) 09/13/2021      Radiology:   Near complete opacification of the right hemithorax with mildly improved   aeration.       Mildly increased left-sided interstitial opacities favoring interstitial   edema.       Unchanged position of support devices     1.        Assessment/Plan     Patient Active Problem List    Diagnosis Date Noted    Acute respiratory failure ([address] 05/04/2022     Priority: Medium    Acute on chronic respiratory failure with hypoxemia (HCC)      Priority: Medium    Pleural effusion 05/02/2022     Priority: Medium    Sepsis ([facility] 03/19/2022    Hepatic encephalopathy ([facility] 03/19/2022    Hearing loss 03/11/2022    Cirrhosis of liver with ascites ([facility]     Secondary esophageal varices without bleeding (HCC)     Gastric polyps     SOB (shortness of breath) 09/13/2021    Portal hypertension ([address] 08/30/2021    JENNIFER (obstructive sleep apnea) 08/09/2021    Bilateral hearing loss 05/07/2021    Increased ammonia level 05/07/2021    Class 3 severe obesity with body mass index (BMI) of 40.0 to 44.9 in adult [facility]) 09/21/2020    Hyperglycemia 12/04/2019    Acquired hypothyroidism 12/04/2019    Pulmonary nodules 48/83/3304    Alcoholic cirrhosis of liver with ascites (HCC)-Dr Jacques 08/08/2019    Mixed hyperlipidemia 08/08/2019    Hypertension 08/08/2019    History of alcohol abuse 08/08/2019    Gastroesophageal reflux disease without esophagitis 08/08/2019    Anxiety 08/08/2019    Shortness of breath 05/26/2019    History of colonic polyps 01/15/2019   Acute Hypoxic resp failure  Recurrent Right pleural effusion likely sec to Hepatic Hydrothorax  Ascites s/p paracentesis  Rhino virus pneumonia  Grade II Diastolic dysfunction  Mild Pulmonary HTN  Alcoholic Cirrhosis   Portal HTN  Hypothyroidism  Morbid Obesity  ? JENNIFER  VDRF  Toxic Metabolic encephalopathy       1. Abx  2. F/u C&S  3. Lactulose for 3 bowel movements per day  4. Right thoracentesis  5. Diuresis  6. Keep sats > 92%  7. Ammonia level in am  8. CHF optimization  9. Pleural fluid analysis  10. CXR in am  6. SAT and SBT trial in am  12.  C/w present management\                                                                            D/w nursing, RT and patient family and more than 30 minutes of cc time spent on the patient    Electronically signed by Amira Lutz MD on 5/6/2022 at 8:45 AM

## 2022-05-06 NOTE — PROGRESS NOTES
V2.0  [facility] [place] [facility]      Name:  Padilla Cedeno /Age/Sex: 1945  (78 y.o. male)   MRN & CSN:  [id number] & [id number] Encounter Date/Time: 2022 9:44 AM EDT    Location:  -A PCP: Kareen Augustine, [address] Day: 5    Assessment and Plan:   Padilla Cedeno is a 78 y.o. male with pmh of HTN, portal hypertension, alcoholic cirrhosis with ascites who presents with Pleural effusion. Acute Respiratory failure   Acute hepatic  encephalopathy  Alcohol cirrhosis with ascites  Hypokalemia   Hypophosphatemia  History of CHF  History of hypothyroid       Plan:  Continue full mechanical ventilation at this time  Plan for thoracentesis today at 12:00, pulmonology following   Continue Lasix infusion   Echo reviewed, LVEF-50-55%  Continue Levophed, wean for SBP >90  Continue Lactulose and Rifaximin, GI following. If no BM by this afternoon will change to lactulose to enemas. Continue Cefepime and Vanco, cultures pending  Continue tube feeding for nutritional support. Monitor electrolytes closely, replete as needed     GI prophlaxis- Pepcid; TF  Lines: PIV/ Schilling/ OG     Plan of care discussed Dr.Ahmed Bernstein ADULT TUBE FEEDING; Orogastric; Peptide Based High Protein; Continuous; 10; Yes; 10; Q 6 hours; 45; 30; Q 4 hours; Protein; Proteinex BID   DVT Prophylaxis [x] Lovenox, []  Heparin, [] SCDs, [] Ambulation,  [] Eliquis, [] Xarelto  [] Coumadin   Code Status Full Code   Disposition   Patient requires continued admission due to acute respiratory failure          Subjective:   No acute events overnight. He remains intubated and sedated. Lasix drip continuous with pressor support. He is minimally responsive to voice at this time. Plan of care discussed with bedside RN. Review of Systems:    Review of Systems: Impossible at this time due to mental status     Objective:        Intake/Output Summary (Last 24 hours) at 2022 0944  Last data filed at 2022 0800  Gross per 24 hour   Intake 2273.32 ml   Output 1220 ml   Net 1053.32 ml        Vitals:   Vitals:    05/06/22 0900   BP: 135/61   Pulse: 64   Resp: 16   Temp:    SpO2: 97%       Physical Exam:     General: Ill appearing male on ventilator in no acute distress. Eyes: EOMI  ENT: neck supple. ETT and OG in place   Cardiovascular: Regular rate. Respiratory: Diminished, upper airway rhonchi noted. Equal chest expansion   Gastrointestinal: Soft, non tender  Genitourinary:  Schilling   Musculoskeletal: Bilateral LE edema   Skin: warm, dry, intact   Neuro: Sedated   Psych: Calm with sedation     Medications:   Medications:    cefepime  2,000 mg IntraVENous Q8H    lidocaine  30 mL IntraDERmal Once    vancomycin  1,750 mg IntraVENous Q24H    albuterol sulfate HFA  4 puff Inhalation Q4H    chlorhexidine  15 mL Mouth/Throat BID    famotidine (PEPCID) injection  20 mg IntraVENous BID    lidocaine PF  5 mL IntraDERmal Once    sodium chloride flush  5-40 mL IntraVENous 2 times per day    insulin regular  0-18 Units SubCUTAneous Q6H    sodium chloride flush  5-40 mL IntraVENous 2 times per day    DULoxetine  60 mg Oral Daily    lactulose  20 g Oral TID    levothyroxine  50 mcg Oral Daily    magnesium oxide  200 mg Oral BID    nadolol  40 mg Oral Daily    rifAXIMin  550 mg Oral BID    spironolactone  100 mg Oral Daily    sodium chloride flush  5-40 mL IntraVENous 2 times per day    [Held by provider] enoxaparin  30 mg SubCUTAneous BID      Infusions:    furosemide (LASIX) 1mg/ml infusion 5 mg/hr (05/06/22 0634)    propofol 40 mcg/kg/min (05/06/22 0842)    fentaNYL 100 mcg/hr (05/06/22 0634)    sodium chloride 10 mL/hr at 05/06/22 0634    norepinephrine 5 mcg/min (05/06/22 0843)    sodium chloride      sodium chloride       PRN Meds: sodium chloride flush, 5-40 mL, PRN  sodium chloride, , PRN  sodium phosphate IVPB, 15 mmol, PRN   Or  sodium phosphate IVPB, 15 mmol, PRN  sodium chloride flush, 5-40 mL, PRN  sodium chloride, , PRN  albuterol sulfate HFA, 2 puff, Q4H PRN  sodium chloride flush, 10 mL, PRN  sodium chloride, , PRN  nicotine polacrilex, 2 mg, Q1H PRN  acetaminophen, 650 mg, Q6H PRN   Or  acetaminophen, 650 mg, Q6H PRN  ondansetron, 4 mg, Q6H PRN  ALPRAZolam, 0.5 mg, Daily PRN        Labs      Recent Results (from the past 24 hour(s))   Blood gas, arterial    Collection Time: 05/05/22 10:30 AM   Result Value Ref Range    pH, Bld 7.35 7.34 - 7.45    pCO2, Arterial 65.0 (H) 32 - 45 MMHG    pO2, Arterial 41 (L) 75 - 100 MMHG    Base Exc, Mixed 7.9 (H) 0 - 1.2    HCO3, Arterial 35.9 (H) 18 - 23 MMOL/L    CO2 Content 37.9 (H) 19 - 24 MMOL/L    O2 Sat 71.3 (L) 96 - 97 %    Carbon Monoxide, Blood 2.5 0 - 5 %    Methemoglobin, Arterial 1.2 <1.5 %    Comment 4L NC CURRENTLY LETHARGIC    POCT Glucose    Collection Time: 05/05/22 10:30 AM   Result Value Ref Range    POC Glucose 107 (H) 70 - 99 MG/DL   Blood gas, arterial    Collection Time: 05/05/22  1:45 PM   Result Value Ref Range    pH, Bld 7.35 7.34 - 7.45    pCO2, Arterial 63.0 (H) 32 - 45 MMHG    pO2, Arterial 95 75 - 100 MMHG    Base Exc, Mixed 7 (H) 0 - 1.2    HCO3, Arterial 34.8 (H) 18 - 23 MMOL/L    CO2 Content 36.7 (H) 19 - 24 MMOL/L    O2 Sat 94.9 (L) 96 - 97 %    Carbon Monoxide, Blood 2.4 0 - 5 %    Methemoglobin, Arterial 1.7 (H) <1.5 %    Comment VC 16 500 100% +5    C-Reactive Protein    Collection Time: 05/05/22  4:05 PM   Result Value Ref Range    CRP, High Sensitivity 14.2 mg/L   Procalcitonin    Collection Time: 05/05/22  4:05 PM   Result Value Ref Range    Procalcitonin 0.064    TSH    Collection Time: 05/05/22  4:05 PM   Result Value Ref Range    TSH, High Sensitivity 0.900 0.270 - 4.20 uIu/ml   Vitamin B12    Collection Time: 05/05/22  4:05 PM   Result Value Ref Range    Vitamin B-12 768.8 211 - 911 pg/ml   CBC with Auto Differential    Collection Time: 05/05/22  4:05 PM   Result Value Ref Range    WBC 9.1 4.0 - 10.5 K/CU MM    RBC 3.38 (L) 4.6 - 6.2 M/CU MM    Hemoglobin 11.0 (L) 13.5 - 18.0 GM/DL    Hematocrit 33.8 (L) 42 - 52 %    .0 78 - 100 FL    MCH 32.5 (H) 27 - 31 PG    MCHC 32.5 32.0 - 36.0 %    RDW 14.3 11.7 - 14.9 %    Platelets 079 765 - 247 K/CU MM    MPV 10.9 7.5 - 11.1 FL    Differential Type AUTOMATED DIFFERENTIAL     Segs Relative 82.7 (H) 36 - 66 %    Lymphocytes % 6.7 (L) 24 - 44 %    Monocytes % 8.2 (H) 0 - 4 %    Eosinophils % 1.5 0 - 3 %    Basophils % 0.6 0 - 1 %    Segs Absolute 7.5 K/CU MM    Lymphocytes Absolute 0.6 K/CU MM    Monocytes Absolute 0.7 K/CU MM    Eosinophils Absolute 0.1 K/CU MM    Basophils Absolute 0.1 K/CU MM    Nucleated RBC % 0.0 %    Total Nucleated RBC 0.0 K/CU MM    Total Immature Neutrophil 0.03 K/CU MM    Immature Neutrophil % 0.3 0 - 0.43 %   Comprehensive Metabolic Panel    Collection Time: 05/05/22  4:05 PM   Result Value Ref Range    Sodium 135 135 - 145 MMOL/L    Potassium 3.8 3.5 - 5.1 MMOL/L    Chloride 98 (L) 99 - 110 mMol/L    CO2 29 21 - 32 MMOL/L    BUN 17 6 - 23 MG/DL    CREATININE 1.1 0.9 - 1.3 MG/DL    Glucose 214 (H) 70 - 99 MG/DL    Calcium 8.8 8.3 - 10.6 MG/DL    Albumin 2.8 (L) 3.4 - 5.0 GM/DL    Total Protein 6.3 (L) 6.4 - 8.2 GM/DL    Total Bilirubin 1.1 (H) 0.0 - 1.0 MG/DL    ALT 18 10 - 40 U/L    AST 34 15 - 37 IU/L    Alkaline Phosphatase 90 40 - 129 IU/L    GFR Non-African American >60 >60 mL/min/1.73m2    GFR African American >60 >60 mL/min/1.73m2    Anion Gap 8 4 - 16   Magnesium    Collection Time: 05/05/22  4:05 PM   Result Value Ref Range    Magnesium 1.5 (L) 1.8 - 2.4 mg/dl   Phosphorus    Collection Time: 05/05/22  4:05 PM   Result Value Ref Range    Phosphorus 1.1 (L) 2.5 - 4.9 MG/DL   Ammonia    Collection Time: 05/05/22  5:58 PM   Result Value Ref Range    Ammonia 40 16 - 60 UMOL/L   POCT Glucose    Collection Time: 05/05/22 11:25 PM   Result Value Ref Range    POC Glucose 102 (H) 70 - 99 MG/DL   Basic Metabolic Panel w/ Reflex to MG    Collection Time: 05/06/22 4:40 AM   Result Value Ref Range    Sodium 138 135 - 145 MMOL/L    Potassium 3.2 (L) 3.5 - 5.1 MMOL/L    Chloride 99 99 - 110 mMol/L    CO2 29 21 - 32 MMOL/L    Anion Gap 10 4 - 16    BUN 19 6 - 23 MG/DL    CREATININE 1.1 0.9 - 1.3 MG/DL    Glucose 99 70 - 99 MG/DL    Calcium 9.1 8.3 - 10.6 MG/DL    GFR Non-African American >60 >60 mL/min/1.73m2    GFR African American >60 >60 mL/min/1.73m2   Ammonia    Collection Time: 05/06/22  4:40 AM   Result Value Ref Range    Ammonia 45 16 - 60 UMOL/L   Protime-INR    Collection Time: 05/06/22  4:40 AM   Result Value Ref Range    Protime 15.2 (H) 11.7 - 14.5 SECONDS    INR 1.18 INDEX   Magnesium    Collection Time: 05/06/22  4:40 AM   Result Value Ref Range    Magnesium 1.9 1.8 - 2.4 mg/dl   Phosphorus    Collection Time: 05/06/22  4:40 AM   Result Value Ref Range    Phosphorus 1.4 (L) 2.5 - 4.9 MG/DL   Blood gas, arterial    Collection Time: 05/06/22  6:00 AM   Result Value Ref Range    pH, Bld 7.58 (H) 7.34 - 7.45    pCO2, Arterial 36.0 32 - 45 MMHG    pO2, Arterial 141 (H) 75 - 100 MMHG    Base Exc, Mixed 11.2 (H) 0 - 1.2    HCO3, Arterial 33.8 (H) 18 - 23 MMOL/L    CO2 Content 34.9 (H) 19 - 24 MMOL/L    O2 Sat 96.6 96 - 97 %    Carbon Monoxide, Blood 2.0 0 - 5 %    Methemoglobin, Arterial 1.5 (H) <1.5 %    Comment AC/VC, 16, 500, 60%, +5         Imaging/Diagnostics Last 24 Hours   Echocardiogram complete 2D with doppler with color    Result Date: 5/5/2022  Transthoracic Echocardiography Report (TTE)  Demographics   Patient Name       Inés Cash   Date of Study       05/05/2022   Date of Birth      1945         Gender              Male   Age                78 year(s)         Race                Unknown   Patient Number     [id number]         Room Number         2119   Visit Number       [id number]   Corporate ID       [id number]   Accession Number   [id number]         Liborio Portillo, [facility]   Ordering Physician Ollie Tripathi MD          Physician           Ortiz Cast MD  Procedure Type of Study   TTE procedure:ECHOCARDIOGRAM COMPLETE 2D W DOPPLER W COLOR. Procedure Date Date: 05/05/2022 Start: 09:55 AM Study Location: Portable Technical Quality: Fair visualization Indications:Dyspnea/SOB. Patient Status: Routine Height: 68 inches Weight: 285 pounds BSA: 2.38 m2 BMI: 43.33 kg/m2 HR: 73 bpm BP: 123/49 mmHg  Conclusions   Summary  Technically difficult study due to uncooperative patient. Left ventricular systolic function is normal.  Ejection fraction is visually estimated at 50-55%. Mild left ventricular hypertrophy. Sclerotic, but non-stenotic aortic valve. No evidence of any pericardial effusion. Large bilateral pleural effusion. Signature   ------------------------------------------------------------------  Electronically signed by Lizeth Damian MD  (Interpreting physician) on 05/05/2022 at 01:35 PM  ------------------------------------------------------------------   Findings   Left Ventricle  Left ventricular systolic function is normal.  Ejection fraction is visually estimated at 50-55%. Mild left ventricular hypertrophy. No regional wall motion abnormalites. Left Atrium  Essentially normal left atrium. Right Atrium  Right heart appears dilated. Right Ventricle  Right heart appears dilated. Aortic Valve  Sclerotic, but non-stenotic aortic valve. Mitral Valve  The mitral valve was not well visualized; no significant stenosis or  regurgitation noted. Tricuspid Valve  Tricuspid valve was not well visualized; however, no significant stenosis or  regurgitation noted. Pulmonic Valve  The pulmonic valve was not well visualized. Pericardial Effusion  No evidence of any pericardial effusion. Pleural Effusion  Large bilateral pleural effusion.   M-Mode/2D Measurements & Calculations   LV Diastolic Dimension:  LV Systolic Dimension:  LA Dimension: 3.2 cmAO Root  3.38 cm                  2.51 cm                 Dimension: 3 cmLA Area:  LV FS:25.7 %             LV Volume Diastolic:    31.9 cm2  LV PW Diastolic: 2.88 cm 373 ml  Septum Diastolic: 6.43   LV Volume Systolic: 54  cm                       ml  CO: 5.42 l/min           LV EDV/LV EDV Index:  CI: 2.28 l/m*m2          126 ml/53 m2LV ESV/LV   LA/Aorta: 1.07                           ESV Index: 54 ml/23 m2  LV Area Diastolic: 42.4  EF Calculated (A4C):    LA volume/Index: 62 ml  cm2                      57.1 %                  /45V6  LV Area Systolic: 15.8   EF Calculated (2D):  cm2                      51.9 %                            LV Length: 8.19 cm                            LVOT: 1.8 cm  Doppler Measurements & Calculations   MV Peak E-Wave: 91.8     AV Peak Velocity: 193 cm/s  LVOT Peak Velocity: 110  cm/s                     AV Peak Gradient: 14.9 mmHg cm/s  MV Peak A-Wave: 80.9     AV Mean Velocity: 140 cm/s  LVOT Mean Velocity:  cm/s                     AV Mean Gradient: 9 mmHg    76.2 cm/s  MV E/A Ratio: 1.13       AV VTI: 41.4 cm             LVOT Peak Gradient: 5  MV Peak Gradient: 3.37   AV Area (Continuity):1.79   mmHgLVOT Mean Gradient:  mmHg                     cm2                         3 mmHg  MV Mean Gradient: 2 mmHg  MV Mean Velocity: 60.3   LVOT VTI: 29.2 cm  cm/s  MV P1/2t: 71 msec                                    TR Velocity:114 cm/s  MVA by PHT:3.1 cm2                                   TR Gradient:5.2 mmHg  MV Area (continuity):                                PV Peak Velocity: 117  2.05 cm2                                             cm/s                                                       PV Peak Gradient: 5.48                                                       mmHg      CT ABDOMEN PELVIS WO CONTRAST Additional Contrast? None    Result Date: 5/5/2022  EXAMINATION: CT OF THE ABDOMEN AND PELVIS WITHOUT CONTRAST 5/5/2022 3:29 pm TECHNIQUE: CT of the abdomen and pelvis was performed without the administration of intravenous contrast. Multiplanar reformatted images are provided for review. Dose modulation, iterative reconstruction, and/or weight based adjustment of the mA/kV was utilized to reduce the radiation dose to as low as reasonably achievable. COMPARISON: 09/13/2021 and 05/27/2019 HISTORY: ORDERING SYSTEM PROVIDED HISTORY: Has evidence of peritonitis on ascitic fluid count, rule out any GI pathology TECHNOLOGIST PROVIDED HISTORY: Reason for exam:->Has evidence of peritonitis on ascitic fluid count, rule out any GI pathology Additional Contrast?->None Reason for Exam: Has evidence of peritonitis on ascitic fluid count, rule out any GI pathology FINDINGS: Lower Chest: There is complete opacification of the right hemithorax, with a large pleural effusion and compressive atelectasis of the right lung. There is deviation of the mediastinum to the right. There is mild atelectasis in the the left lung base. Gastric tube is in good position. Organs: There is large amount of ascites. The liver demonstrates morphology of cirrhosis with surface nodularity and atrophy of the right lobe. The patient is status post cholecystectomy. The pancreas appears normal.  The spleen appears normal.  The adrenal glands appear normal.  There is a stable left renal cyst.  The right kidney is unremarkable. GI/Bowel: Stomach, small bowel loops and colon appear normal.  There is mild to moderate mesenteric adenopathy. This is unchanged from the prior examination of 05/27/2019. Pelvis: There is a Schilling catheter in an empty bladder. The prostate is normal size. Rectum is unremarkable. Peritoneum/Retroperitoneum: There is mild retroperitoneal adenopathy, without change since 2019.  Bones/Soft Tissues: Unremarkable     Complete opacification of the right hemithorax with a large right pleural effusion, with compressive atelectasis of the right lung, and shift of the mediastinum to the left. Minor atelectasis noted in the left lung base. Large amount of ascites. Evidence of the cirrhosis. Stable left renal cyst. Stable mesenteric and retroperitoneal lymphadenopathy without change since 2019. CT HEAD WO CONTRAST    Result Date: 5/5/2022  EXAMINATION: CT OF THE HEAD WITHOUT CONTRAST  5/5/2022 11:29 am TECHNIQUE: CT of the head was performed without the administration of intravenous contrast. Dose modulation, iterative reconstruction, and/or weight based adjustment of the mA/kV was utilized to reduce the radiation dose to as low as reasonably achievable. COMPARISON: 02/11/2022. HISTORY: ORDERING SYSTEM PROVIDED HISTORY: confusion TECHNOLOGIST PROVIDED HISTORY: Reason for exam:->confusion Has a \"code stroke\" or \"stroke alert\" been called? ->No Reason for Exam: AMS/ confusion FINDINGS: BRAIN/VENTRICLES: The study is compromised by patient motion. There is no acute intracranial hemorrhage, mass effect or midline shift. No abnormal extra-axial fluid collection. The gray-white differentiation is maintained without evidence of an acute infarct. There is prominence of the ventricles and sulci due to global parenchymal volume loss. There are nonspecific areas of hypoattenuation within the periventricular and subcortical white matter, which likely represent chronic microvascular ischemic change. ORBITS: The visualized portion of the orbits demonstrate no acute abnormality. SINUSES: The visualized paranasal sinuses and mastoid air cells demonstrate no acute abnormality. SOFT TISSUES/SKULL: No acute abnormality of the visualized skull or soft tissues. 1. Motion compromised study but no acute intracranial abnormality.   I would suggest follow-up if symptoms persist.     XR CHEST PORTABLE    Result Date: 5/6/2022  EXAMINATION: ONE XRAY VIEW OF THE CHEST 5/6/2022 4:36 am COMPARISON: Chest radiograph 05/05/2022, abdominal radiograph 05/05/2022 HISTORY: ORDERING SYSTEM PROVIDED HISTORY: ventilator TECHNOLOGIST PROVIDED HISTORY: Reason for exam:->ventilator Reason for Exam: vent Additional signs and symptoms: sob Relevant Medical/Surgical History: pleural effusion FINDINGS: The patient is rotated. The endotracheal tube tip is 6 cm above the berny. Right upper extremity PICC distal tip projects over the region of the cavoatrial junction, similar to prior. The enteric tube courses below the diaphragm. The cardiomediastinal silhouette is obscured. Near complete opacification of the right hemithorax with mildly improved ventilation of the right perihilar region. Scattered interstitial opacities within the left lung appear mildly increased. No discrete pneumothorax on this limited semi upright study. Trace left pleural effusion persists. .  Osseous structures are grossly unchanged. Near complete opacification of the right hemithorax with mildly improved aeration. Mildly increased left-sided interstitial opacities favoring interstitial edema. Unchanged position of support devices. XR CHEST PORTABLE    Result Date: 5/5/2022  EXAMINATION: ONE XRAY VIEW OF THE CHEST 5/5/2022 5:04 am COMPARISON: Chest radiograph 05/02/2022 HISTORY: ORDERING SYSTEM PROVIDED HISTORY: Hypoxia TECHNOLOGIST PROVIDED HISTORY: Reason for exam:->Hypoxia Reason for Exam: hypoxia Additional signs and symptoms: sob Relevant Medical/Surgical History: pleural effusion FINDINGS: Opacification the right hemithorax is now complete, progressed in comparison to 05/02/2022. Associated shifting of mediastinal structures to the left. The cardiomediastinal silhouette is partially obscured. Likely trace left pleural effusion. Left lung volume is low with bronchovascular crowding. No definite acute osseous abnormality. No visualized pneumothorax. Significant progression of opacification of the right hemithorax, which is now complete. This likely represent progressed pleural effusion and atelectasis. Likely trace left pleural effusion, similar to prior     IR US GUIDED PARACENTESIS    Result Date: 5/5/2022  PROCEDURE: PARACENTESIS WITHOUT IMAGE GUIDANCE US ABDOMEN LIMITED 5/5/2022 HISTORY: ORDERING SYSTEM PROVIDED HISTORY: ASCITES TECHNOLOGIST PROVIDED HISTORY: Remove as much fluid as possible Please send fluid for: cell count and diff, culture and sensitivity, total protein, albumin. amylase Please give 6-8 grams albumin IV for each liter fluid removed Please innoculate fluid immediately into aerobic and non-aerobic blood culture bottles for the culture specimen Reason for exam:->ASCITES TECHNIQUE: Informed consent was obtained after a detailed explanation of the procedure including risks, benefits, and alternatives. Universal protocol was followed. A limited ultrasound of the abdomen was performed. The right abdomen was prepped and draped in sterile fashion and local anesthesia was achieved with lidocaine. An 6 Mauritian needle sheath was advanced into ascites and paracentesis was performed. The patient tolerated the procedure well. FINDINGS: Limited ultrasound of the abdomen demonstrates ascites with paracentesis needle/catheter within it. A total of 6900 mL cloudy yellow fluid was removed. 1020 mL sample sent for laboratory evaluation. No complication suggested. Successful ultrasound-guided paracentesis with specimen sent for laboratory evaluation. US DUP ABD PEL RETRO SCROT COMPLETE    Result Date: 5/5/2022  EXAMINATION: DOPPLER EVALUATION OF THE PELVIS 5/5/2022 6:43 am COMPARISON: Right upper quadrant ultrasound performed March 21, 2022. HISTORY: ORDERING SYSTEM PROVIDED HISTORY: persistent ascites, hepatic hydrothorax, determine patency of portal and hepatic veins and candidacy for TIPS TECHNOLOGIST PROVIDED HISTORY: Reason for exam:->Persistent ascites, hepatic hydrothorax, determine patency of portal and hepatic veins and candidacy for TIPS Reason for Exam: Cirrhosis.   Eval for TIPS FINDINGS: Cirrhotic morphology of the liver again demonstrated. No focal liver lesion. Hepatopetal flow is demonstrated in the portal veins and splenic vein. Flow within the main portal vein measures approximately 20 cm/sec. Arterial flow is noted within the hepatic artery. Flow is demonstrated in the hepatic veins towards the IVC. Small volume ascites. 1. Portal and hepatic veins appear grossly patent. 2. Cirrhosis without focal liver lesion demonstrated. 3. Small volume ascites. XR ABDOMEN FOR NG/OG/NE TUBE PLACEMENT    Result Date: 5/5/2022  EXAMINATION: ONE SUPINE XRAY VIEW(S) OF THE ABDOMEN 5/5/2022 3:22 pm COMPARISON: CT and pelvis 09/13/2021 HISTORY: ORDERING SYSTEM PROVIDED HISTORY: Confirmation of ETT and course of NG/OG/NE tube and location of tip of tube TECHNOLOGIST PROVIDED HISTORY: Reason for exam:->Confirmation of ETT and course of NG/OG/NE tube and location of tip of tube Portable? ->Yes Reason for Exam: Confirmation of ETT and course of NG/OG/NE tube and location of tip of tube Additional signs and symptoms: NA Relevant Medical/Surgical History: NA FINDINGS: There is opacification right hemithorax sinus may be extrinsic to the patient versus underlying airspace disease. .  Endotracheal tube identified level mid clavicles satisfactory position. Right IJ catheter identified tip terminates within the region of the mid distal SVC near cavoatrial junction. Enteric tube courses below the hemidiaphragms retention cyst side-port within satisfactory position. Left lung is grossly clear. Satisfactory position lines and catheters. Right sided chest opacification. Question extrinsic to the patient     [address]    Result Date: 5/5/2022  EXAMINATION: ULTRASOUND OF THE CHEST 5/5/2022 11:50 am COMPARISON: Chest radiograph 05/05/2022.  HISTORY: ORDERING SYSTEM PROVIDED HISTORY: Right pleural effusion TECHNOLOGIST PROVIDED HISTORY: Reason for exam:->Right pleural effusion Reason

## 2022-05-06 NOTE — CARE COORDINATION
Pt transferred to ICU 5/5 from ICU Stepdown. Pt required intubation and was placed on ventilator. Cm will follow for post extubation needs.

## 2022-05-06 NOTE — CONSULTS
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 Culture: NGTD  · Dr. Margarita Messer onboard for pulmonology. · Dr. Kathe Harper, GI, onboard, rec lasix gtt as well as spironolactone, rifaximin and lactulose, possible TIPS when decided by patient. Imp of no SBP present as WBC was 450 but in is the total Neutrophil count 8% x 451=36, which is not suggestive. · JENNIFER:  · HTN:  · HFpEF:  · Dr. Luh Kang, cardiology, onboard, imp of recurrent pleural effusion secondary to fluid overload with HFpEF and cirrhosis    · Hypothyroidism    · Hearing Impaired    · Pulmonary Nodules:    · Morbid Obesity:  BMI 43.34  ·    Multi-morbidity: per PMHx:  Anxiety, alcoholic cirrhosis, GERD, HLD, HTN, portal HTN, pulmonary nodules, JENNIFER, choley    Plan:   Continue IV cefepime and vancomcyin   Agree with present management, as ID was called to evaluate for possible SBP, which is not evident at this time. · Will sign off and not follow the patient actively, please reconsult as needed.     Thank you for allowing me to consult in the care of this patient.  ------------------------  REASON FOR CONSULT: Infective syndrome     Requested by:  Jaren Ortiz    HPI:Patient is a 78 y.o.  male with a history of alcoholic liver cirrhosis with portal HTN, HTN, hypothyroidism, and morbid obesity who was admitted 5/2/2022 for further evaluation and management of worsening SOB x 5 days prior to admission. Reports unable to lay flat at home, had severe dyspnea. CXR showed total opacity of the right hemithorax. He underwent a thoracentesis on 5/3 as well as a paracentesis. He was started on IV empiric ABX therapy of cefepime and vancomycin. He developed severe respiratory distress, was intubated and mechanically ventilated on 5/5. The HPI is obtained from the EMR as the patient is intubated. ID has been consulted regarding possible SBP. ? Infectious diseases service was consulted to evaluate the pt, and recommend further investigative and therapeutic measures. ROS: Other systems reviewed Including eyes, ENT, respiratory, cardiovascular, GI, , dermatologic, neurologic, psych, hem/lymphatic, musculoskeletal and endocrine were negative other than what is mentioned above.    Review of Systems   Unable to perform ROS: Intubated       Patient Active Problem List    Diagnosis Date Noted    Acute respiratory failure ([address] 05/04/2022    Acute on chronic respiratory failure with hypoxemia (HCC)     Pleural effusion 05/02/2022    Sepsis ([address] 03/19/2022    Hepatic encephalopathy ([address] 03/19/2022    Hearing loss 03/11/2022    Cirrhosis of liver with ascites ([address]     Secondary esophageal varices without bleeding (HCC)     Gastric polyps     SOB (shortness of breath) 09/13/2021    Portal hypertension ([address] 08/30/2021    JENNIFER (obstructive sleep apnea) 08/09/2021    Bilateral hearing loss 05/07/2021    Increased ammonia level 05/07/2021    Class 3 severe obesity with body mass index (BMI) of 40.0 to 44.9 in adult [facility]) 09/21/2020    Hyperglycemia 12/04/2019    Acquired hypothyroidism 12/04/2019    Pulmonary nodules 09/09/2019    Alcoholic cirrhosis of liver with ascites (HCC)-Dr Jacques 2019    Mixed hyperlipidemia 2019    Hypertension 2019    History of alcohol abuse 2019    Gastroesophageal reflux disease without esophagitis 2019    Anxiety 2019    Shortness of breath 2019    History of colonic polyps 01/15/2019     Past Medical History:   Diagnosis Date    Anxiety     Cirrhosis, alcoholic (HCC)     GERD (gastroesophageal reflux disease)     GERD (gastroesophageal reflux disease)     Hyperlipidemia     Hypertension     Portal hypertension (HCC)     Pulmonary nodules     Sleep apnea     SOB (shortness of breath)       Past Surgical History:   Procedure Laterality Date    CHOLECYSTECTOMY      COLONOSCOPY      ENDOSCOPY, COLON, DIAGNOSTIC      FOOT SURGERY      needle removed,not sure which foot, per wife.  UPPER GASTROINTESTINAL ENDOSCOPY N/A 2022    EGD BIOPSY performed by Beverley Heaton MD at [address] [place] [address]        Family History   Problem Relation Age of Onset    Hypertension Brother     Diabetes Other       Infectious disease related family history - not contibutory. SOCIAL HISTORY  Social History     Tobacco Use    Smoking status: Former Smoker     Packs/day: 1.00     Years: 14.00     Pack years: 14.00     Quit date:      Years since quittin.3    Smokeless tobacco: Never Used   Substance Use Topics    Alcohol use: Not Currently       Born: [facility], [address] [place] [address] Lives: [place], [place] with wife   Occupation: Retired    No recent travel of significance.  No recent unusual exposures.  Pets: one dog, one cat  ?   ALLERGIES  Allergies   Allergen Reactions    Lisinopril Swelling     Tongue swelling      Zetia [Ezetimibe] Swelling     Facial swelling    Atorvastatin     Codeine Other (See Comments)     Blood pressure drops    Hydrochlorothiazide     Hydroxyzine      Fatigue and depressed    Lexapro [Escitalopram Oxalate] Increased depression    Pravastatin       MEDICATIONS  Reviewed and are per the chart/EMR. ? Antibiotics:  Present:  Rifaximin 5/2-  Cefepime 5/5-  Vancomycin 5/5-    Past:  ?  -------------------------------------------------------------------------------------------------------------------    Vital Signs:  Vitals:    05/06/22 1000   BP: (!) 107/49   Pulse: 66   Resp: 16   Temp:    SpO2: 98%         Exam:    VS: noted; wt 285 lb (129.3 kg) Height 5'8\"  Gen: sedated and ventilated, no distress  Skin: no stigmata of endocarditis  Wounds: C/D/I  HEMT: AT/NC Oropharynx pink, moist, and without lesions or exudates; dentition in good state of repair  Eyes: PERRLA, EOMI, conjunctiva pink, sclera anicteric. Neck: Supple. Trachea midline. No LAD. Chest: no distress and CTA. Anterior breath sounds clear to diminished in the bases. Oral ETT intact on mechanical vent. Heart: NSR and no MRG. Abd: soft, non-distended, no tenderness, no hepatomegaly. Normoactive bowel sounds. Ext: no clubbing, cyanosis. +2 pitting edema in extremities and anasarca present. Catheter Site: without erythema or tenderness draining clear yellow urine. Neuro: Mental status intact. CN 2-12 intact and no focal sensory or motor deficits    ? Diagnostic Studies: reviewed  5/2/22 XR Chest Portable:  Impression   Complete opacification of the right hemithorax likely due to a large right   pleural effusion and atelectasis. 5/2/22 IR Guided Thoracentesis:     FINDINGS:   Pre and intraprocedural images demonstrate large right pleural effusion with   thoracentesis catheter within it.  A total of 2200 mL cloudy yellow fluid was   removed.  750 mL sample sent for laboratory evaluation.  No complication   suggested.      5/5/22 XR Chest Portable:  Impression   Significant progression of opacification of the right hemithorax, which is   now complete.  This likely represent progressed pleural effusion and   atelectasis.       Likely trace left pleural effusion, similar to prior     5/5/22 US Dup Abd Pel Retro Scrotum Complete:  Impression   1. Portal and hepatic veins appear grossly patent. 2. Cirrhosis without focal liver lesion demonstrated. 3. Small volume ascites. 5/5/22 IR US Guided Paracentesis:  FINDINGS:   Limited ultrasound of the abdomen demonstrates ascites with paracentesis   needle/catheter within it.  A total of 6900 mL cloudy yellow fluid was   removed.  1020 mL sample sent for laboratory evaluation.  No complication   suggested. 5/5/22 CT Head WO Contrast:  Impression   1. Motion compromised study but no acute intracranial abnormality.  I would   suggest follow-up if symptoms persist.         5/5/22 US Chest:  Impression   Large right pleural effusion. 5/5/22 CT Abdomen Pelvis WO Contrast:  Impression   Complete opacification of the right hemithorax with a large right pleural   effusion, with compressive atelectasis of the right lung, and shift of the   mediastinum to the left.  Minor atelectasis noted in the left lung base.       Large amount of ascites.  Evidence of the cirrhosis.  Stable left renal cyst.       Stable mesenteric and retroperitoneal lymphadenopathy without change since   2019.     ??  I have examined this patient and available medical records on this date and have made the above observations, conclusions and recommendations. Electronically signed by: Electronically signed by Wendy Fish.  MOHINDER Hall CNP on 5/6/2022 at 10:50 AM

## 2022-05-06 NOTE — PROGRESS NOTES
V2.0  [facility]      Name:  Moreno Cooper /Age/Sex: 1945  (78 y.o. male)   MRN & CSN:  [id number] & [id number] Encounter Date/Time: 2022 11:00 AM EDT    Location:  -A PCP: Jeremy Coronado MD       Hospital Day: 4    Assessment and Plan:   Moreno Cooper is a 78 y.o. 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  -Intubated May 5  -Worsening shortness of breath for 5 days prior to arrival  -X-ray showed complete opacitifiacation of the right hemithorax likely due to a large right pleural effusion      Status post thoracentesis on May 3-2200 mL yellow cloudy fluid [id number] cultures  -May 5-fluid reaccumulated- repeat thoracentesis was planned on May 5-but got intubated- thoracentesis planned on May 6    Rhinovirus Enterovirus PCR positive  -Cannot rule out superimposed bacterial infection  -Start cefepime and IV vancomycin    History of CHF-we will consult cardiology as suggested by GI     Alcoholic cirrhosis with ascites  Portal hypertension-appreciate Dr. Burton Habermann consult  -Slightly elevated ammonia level  -Continue spironolactone  -Lasix drip ordered on May 5 because of generalized fluid overload including pleural effusion  -Continue Nadolol for portal hypertension  -Continue rifaximin and lactulose to prevent encephalopathy     Hypothyroidism  -Continue Synthroid 50 MCG     DVT prophylaxis  -Lovenox, start after thoracentesis    Diet ADULT TUBE FEEDING; Orogastric; Peptide Based High Protein; Continuous; 10; Yes; 10; Q 6 hours; 45; 30; Q 4 hours; Protein; Proteinex BID   DVT Prophylaxis [x] Lovenox, []  Heparin, [] SCDs, [] Ambulation,  [] Eliquis, [] Xarelto  [] Coumadin   Code Status Full Code   Disposition From: Home  Expected Disposition: May need rehab since was intubated on May 5  Estimated Date of Discharge: About 7 days  Patient requires continued admission due now being intubated   Surrogate Decision Maker/ POA  wife is listed as an alternate contact     Subjective:     Chief Complaint: Shortness of Breath (reports difficulty breathing for the past 5-6 days and extremely tired)       Shaheed Washington is a 78 y.o. male     May 5: Was confused this morning, and short of breath. He was intubated    May 4: He reports that he had trouble with orthopnea last night because of shortness of breath. Does not feel well because of continued difficulty breathing. May 3    He was more short of breath today        Review of Systems:    Review of Systems    No vomiting or bleeding noted. Objective: Intake/Output Summary (Last 24 hours) at 5/5/2022 2014  Last data filed at 5/5/2022 1700  Gross per 24 hour   Intake 640.23 ml   Output 450 ml   Net 190.23 ml        Vitals:   Vitals:    05/05/22 1945   BP:    Pulse: 78   Resp: 16   Temp:    SpO2: 100%       Physical Exam:     Physical Exam  Constitutional:       Appearance: He is ill-appearing. Eyes:      General:         Right eye: No discharge. Left eye: No discharge. Pulmonary:      Effort: Respiratory distress present. Skin:     Coloration: Skin is not jaundiced.          Medications:   Medications:    cefepime  2,000 mg IntraVENous Q8H    lidocaine  30 mL IntraDERmal Once    [START ON 5/6/2022] vancomycin  1,750 mg IntraVENous Q24H    albuterol sulfate HFA  4 puff Inhalation Q4H    chlorhexidine  15 mL Mouth/Throat BID    famotidine (PEPCID) injection  20 mg IntraVENous BID    lidocaine PF  5 mL IntraDERmal Once    sodium chloride flush  5-40 mL IntraVENous 2 times per day    sodium chloride flush  5-40 mL IntraVENous 2 times per day    DULoxetine  60 mg Oral Daily    lactulose  20 g Oral TID    levothyroxine  50 mcg Oral Daily    magnesium oxide  200 mg Oral BID    nadolol  40 mg Oral Daily    rifAXIMin  550 mg Oral BID    spironolactone  100 mg Oral Daily    sodium chloride flush  5-40 mL IntraVENous 2 times per day    [Held by Ref Range    Fluid Type PERITONEAL INDEX    RBC, Fluid 2,000 /CU MM    WBC, Fluid 451 /CU MM    Neutrophil Count, Fluid 8 %    Lymphocytes, Body Fluid 51 %    Monocyte Count, Fluid 41 %    Mesothelial, Fluid 0 /100 WBC    Other Cells, Fluid 0    Blood gas, arterial    Collection Time: 05/05/22 10:30 AM   Result Value Ref Range    pH, Bld 7.35 7.34 - 7.45    pCO2, Arterial 65.0 (H) 32 - 45 MMHG    pO2, Arterial 41 (L) 75 - 100 MMHG    Base Exc, Mixed 7.9 (H) 0 - 1.2    HCO3, Arterial 35.9 (H) 18 - 23 MMOL/L    CO2 Content 37.9 (H) 19 - 24 MMOL/L    O2 Sat 71.3 (L) 96 - 97 %    Carbon Monoxide, Blood 2.5 0 - 5 %    Methemoglobin, Arterial 1.2 <1.5 %    Comment 4L NC CURRENTLY LETHARGIC    POCT Glucose    Collection Time: 05/05/22 10:30 AM   Result Value Ref Range    POC Glucose 107 (H) 70 - 99 MG/DL   Blood gas, arterial    Collection Time: 05/05/22  1:45 PM   Result Value Ref Range    pH, Bld 7.35 7.34 - 7.45    pCO2, Arterial 63.0 (H) 32 - 45 MMHG    pO2, Arterial 95 75 - 100 MMHG    Base Exc, Mixed 7 (H) 0 - 1.2    HCO3, Arterial 34.8 (H) 18 - 23 MMOL/L    CO2 Content 36.7 (H) 19 - 24 MMOL/L    O2 Sat 94.9 (L) 96 - 97 %    Carbon Monoxide, Blood 2.4 0 - 5 %    Methemoglobin, Arterial 1.7 (H) <1.5 %    Comment VC 16 500 100% +5    C-Reactive Protein    Collection Time: 05/05/22  4:05 PM   Result Value Ref Range    CRP, High Sensitivity 14.2 mg/L   Procalcitonin    Collection Time: 05/05/22  4:05 PM   Result Value Ref Range    Procalcitonin 0.064    TSH    Collection Time: 05/05/22  4:05 PM   Result Value Ref Range    TSH, High Sensitivity 0.900 0.270 - 4.20 uIu/ml   Vitamin B12    Collection Time: 05/05/22  4:05 PM   Result Value Ref Range    Vitamin B-12 768.8 211 - 911 pg/ml   CBC with Auto Differential    Collection Time: 05/05/22  4:05 PM   Result Value Ref Range    WBC 9.1 4.0 - 10.5 K/CU MM    RBC 3.38 (L) 4.6 - 6.2 M/CU MM    Hemoglobin 11.0 (L) 13.5 - 18.0 GM/DL    Hematocrit 33.8 (L) 42 - 52 %    .0 78 - 100 FL    MCH 32.5 (H) 27 - 31 PG    MCHC 32.5 32.0 - 36.0 %    RDW 14.3 11.7 - 14.9 %    Platelets 168 447 - 216 K/CU MM    MPV 10.9 7.5 - 11.1 FL    Differential Type AUTOMATED DIFFERENTIAL     Segs Relative 82.7 (H) 36 - 66 %    Lymphocytes % 6.7 (L) 24 - 44 %    Monocytes % 8.2 (H) 0 - 4 %    Eosinophils % 1.5 0 - 3 %    Basophils % 0.6 0 - 1 %    Segs Absolute 7.5 K/CU MM    Lymphocytes Absolute 0.6 K/CU MM    Monocytes Absolute 0.7 K/CU MM    Eosinophils Absolute 0.1 K/CU MM    Basophils Absolute 0.1 K/CU MM    Nucleated RBC % 0.0 %    Total Nucleated RBC 0.0 K/CU MM    Total Immature Neutrophil 0.03 K/CU MM    Immature Neutrophil % 0.3 0 - 0.43 %   Comprehensive Metabolic Panel    Collection Time: 05/05/22  4:05 PM   Result Value Ref Range    Sodium 135 135 - 145 MMOL/L    Potassium 3.8 3.5 - 5.1 MMOL/L    Chloride 98 (L) 99 - 110 mMol/L    CO2 29 21 - 32 MMOL/L    BUN 17 6 - 23 MG/DL    CREATININE 1.1 0.9 - 1.3 MG/DL    Glucose 214 (H) 70 - 99 MG/DL    Calcium 8.8 8.3 - 10.6 MG/DL    Albumin 2.8 (L) 3.4 - 5.0 GM/DL    Total Protein 6.3 (L) 6.4 - 8.2 GM/DL    Total Bilirubin 1.1 (H) 0.0 - 1.0 MG/DL    ALT 18 10 - 40 U/L    AST 34 15 - 37 IU/L    Alkaline Phosphatase 90 40 - 129 IU/L    GFR Non-African American >60 >60 mL/min/1.73m2    GFR African American >60 >60 mL/min/1.73m2    Anion Gap 8 4 - 16   Magnesium    Collection Time: 05/05/22  4:05 PM   Result Value Ref Range    Magnesium 1.5 (L) 1.8 - 2.4 mg/dl   Phosphorus    Collection Time: 05/05/22  4:05 PM   Result Value Ref Range    Phosphorus 1.1 (L) 2.5 - 4.9 MG/DL   Ammonia    Collection Time: 05/05/22  5:58 PM   Result Value Ref Range    Ammonia 40 16 - 60 UMOL/L        Imaging/Diagnostics Last 24 Hours   XR CHEST PORTABLE    Result Date: 5/3/2022  EXAMINATION: ONE XRAY VIEW OF THE CHEST 5/2/2022 9:29 pm COMPARISON: May 2, 2022 HISTORY: ORDERING SYSTEM PROVIDED HISTORY: post right thoracentesis TECHNOLOGIST PROVIDED HISTORY: Reason for exam:->post right thoracentesis Reason for Exam: post right thoracentesis Additional signs and symptoms: NA Relevant Medical/Surgical History: hypertension FINDINGS: Status post right thoracentesis with interval improvement of the right pleural effusion. A small amount of aerated lung is seen. A large pleural effusion persists. No obvious pneumothorax. The left lung demonstrates a small pleural effusion. No acute or aggressive osseous lesion. 1. Status post right thoracentesis with new partial aeration of the right lung. Large pleural effusion persists. 2. Small left pleural effusion. XR CHEST PORTABLE    Result Date: 5/2/2022  EXAMINATION: ONE XRAY VIEW OF THE CHEST 5/2/2022 1:29 pm COMPARISON: 02/11/2022 HISTORY: ORDERING SYSTEM PROVIDED HISTORY: SOB, known cirrhosis, decreased breath sounds R base; ?effusion TECHNOLOGIST PROVIDED HISTORY: Reason for exam:->SOB, known cirrhosis, decreased breath sounds R base; ?effusion Reason for Exam: SOB, known cirrhosis, decreased breath sounds R base; ?effusion Additional signs and symptoms: SOB, known cirrhosis, decreased breath sounds R base; ?effusion Relevant Medical/Surgical History: SOB, known cirrhosis, decreased breath sounds R base; ?effusion FINDINGS: Complete opacification of the right hemithorax likely related to a large right pleural effusion with severe atelectasis. Leftward shift of the mediastinal structures and trachea. The left lung is grossly clear. The cardiac silhouette is obscured. Complete opacification of the right hemithorax likely due to a large right pleural effusion and atelectasis.        Electronically signed by Liv Topete MD on 5/5/2022 at 8:14 PM

## 2022-05-06 NOTE — PROGRESS NOTES
[id number] [facility]  consulted by Dr. Anibal Worley for monitoring and adjustment. Indication for treatment: CAP  Goal trough: [] 10-15 mcg/mL or [x] 15-20 mcg/ml  AUC/TRES: [] <500 or [x] 400-600    Pertinent Laboratory Values:   Temp Readings from Last 3 Encounters:   05/06/22 97.9 °F (36.6 °C) (Rectal)   04/25/22 97.2 °F (36.2 °C) (Infrared)   03/21/22 98.3 °F (36.8 °C) (Oral)     Recent Labs     05/04/22  1142 05/05/22  1605   WBC 7.6 9.1     Recent Labs     05/05/22  0336 05/05/22  1605 05/06/22  0440   BUN 15 17 19   CREATININE 0.9 1.1 1.1     Estimated Creatinine Clearance: 77 mL/min (based on SCr of 1.1 mg/dL). Intake/Output Summary (Last 24 hours) at 5/6/2022 1717  Last data filed at 5/6/2022 1700  Gross per 24 hour   Intake 2858.01 ml   Output 2645 ml   Net 213.01 ml       Pertinent Cultures:  Date    Source    Results  5/2   Resp PCR   Rhinovirus  5/2   Anaerobic (chest)  Pending  5/2   Pleural Fluid   NGTD   5/5   Ascitic Fluid   Ordered  5/5   MRSA nasal   Pending  5/6   Thoracentesis Fluid  Pending  5/6   Sputum   Pending      Vancomycin level:   TROUGH:  No results for input(s): VANCOTROUGH in the last 72 hours. RANDOM:  No results for input(s): VANCORANDOM in the last 72 hours.     Assessment:  · SCr, BUN, and urine output: WNL, stable  · Day(s) of therapy: 2 of 7  · Vancomycin concentration: to be collected    Plan:  · Vancomycin 2000 mg x1, followed by 1750 mg IVPB q24h  · Predicted trough: 14.4,   · Plan to collect a level in 24h  · Pharmacy will continue to monitor patient and adjust therapy as indicated    Rogelio 3 5/7 @ 0600    Thank you for the consult,  Preston Child, [facility], PharmD  5/6/2022 5:17 PM

## 2022-05-07 ENCOUNTER — APPOINTMENT (OUTPATIENT)
Dept: GENERAL RADIOLOGY | Age: 77
DRG: 432 | End: 2022-05-07
Payer: MEDICARE

## 2022-05-07 LAB
AMMONIA: 53 UMOL/L (ref 16–60)
ANION GAP SERPL CALCULATED.3IONS-SCNC: 11 MMOL/L (ref 4–16)
ANION GAP SERPL CALCULATED.3IONS-SCNC: 11 MMOL/L (ref 4–16)
ANION GAP SERPL CALCULATED.3IONS-SCNC: 7 MMOL/L (ref 4–16)
BASE EXCESS MIXED: 7.6 (ref 0–1.2)
BUN BLDV-MCNC: 19 MG/DL (ref 6–23)
BUN BLDV-MCNC: 21 MG/DL (ref 6–23)
BUN BLDV-MCNC: 23 MG/DL (ref 6–23)
CALCIUM SERPL-MCNC: 8.4 MG/DL (ref 8.3–10.6)
CALCIUM SERPL-MCNC: 8.6 MG/DL (ref 8.3–10.6)
CALCIUM SERPL-MCNC: 8.7 MG/DL (ref 8.3–10.6)
CARBON MONOXIDE, BLOOD: 2 % (ref 0–5)
CHLORIDE BLD-SCNC: 96 MMOL/L (ref 99–110)
CHLORIDE BLD-SCNC: 98 MMOL/L (ref 99–110)
CHLORIDE BLD-SCNC: 99 MMOL/L (ref 99–110)
CHOLESTEROL: 174 MG/DL
CO2 CONTENT: 33.3 MMOL/L (ref 19–24)
CO2: 28 MMOL/L (ref 21–32)
COMMENT: ABNORMAL
CREAT SERPL-MCNC: 1.2 MG/DL (ref 0.9–1.3)
CREAT SERPL-MCNC: 1.3 MG/DL (ref 0.9–1.3)
CREAT SERPL-MCNC: 1.4 MG/DL (ref 0.9–1.3)
CULTURE: NORMAL
DOSE AMOUNT: NORMAL
DOSE TIME: NORMAL
GFR AFRICAN AMERICAN: 60 ML/MIN/1.73M2
GFR AFRICAN AMERICAN: >60 ML/MIN/1.73M2
GFR AFRICAN AMERICAN: >60 ML/MIN/1.73M2
GFR NON-AFRICAN AMERICAN: 50 ML/MIN/1.73M2
GFR NON-AFRICAN AMERICAN: 54 ML/MIN/1.73M2
GFR NON-AFRICAN AMERICAN: 59 ML/MIN/1.73M2
GLUCOSE BLD-MCNC: 108 MG/DL (ref 70–99)
GLUCOSE BLD-MCNC: 122 MG/DL (ref 70–99)
GLUCOSE BLD-MCNC: 125 MG/DL (ref 70–99)
GLUCOSE BLD-MCNC: 155 MG/DL (ref 70–99)
GLUCOSE BLD-MCNC: 94 MG/DL (ref 70–99)
GLUCOSE BLD-MCNC: 97 MG/DL (ref 70–99)
HCO3 ARTERIAL: 32 MMOL/L (ref 18–23)
HDLC SERPL-MCNC: 28 MG/DL
INR BLD: 1.21 INDEX
LDL CHOLESTEROL CALCULATED: 120 MG/DL
Lab: NORMAL
MAGNESIUM: 1.6 MG/DL (ref 1.8–2.4)
MAGNESIUM: 1.7 MG/DL (ref 1.8–2.4)
MAGNESIUM: 1.7 MG/DL (ref 1.8–2.4)
METHEMOGLOBIN ARTERIAL: 1.3 %
O2 SATURATION: 93 % (ref 96–97)
PCO2 ARTERIAL: 43 MMHG (ref 32–45)
PH BLOOD: 7.48 (ref 7.34–7.45)
PHOSPHORUS: 2.3 MG/DL (ref 2.5–4.9)
PHOSPHORUS: 2.5 MG/DL (ref 2.5–4.9)
PHOSPHORUS: 2.7 MG/DL (ref 2.5–4.9)
PO2 ARTERIAL: 70 MMHG (ref 75–100)
POTASSIUM SERPL-SCNC: 3.4 MMOL/L (ref 3.5–5.1)
POTASSIUM SERPL-SCNC: 3.5 MMOL/L (ref 3.5–5.1)
POTASSIUM SERPL-SCNC: 3.5 MMOL/L (ref 3.5–5.1)
PROTHROMBIN TIME: 15.6 SECONDS (ref 11.7–14.5)
SODIUM BLD-SCNC: 131 MMOL/L (ref 135–145)
SODIUM BLD-SCNC: 137 MMOL/L (ref 135–145)
SODIUM BLD-SCNC: 138 MMOL/L (ref 135–145)
SPECIMEN: NORMAL
TRIGL SERPL-MCNC: 130 MG/DL
VANCOMYCIN RANDOM: 20.6 UG/ML

## 2022-05-07 PROCEDURE — 2580000003 HC RX 258: Performed by: NURSE PRACTITIONER

## 2022-05-07 PROCEDURE — 99233 SBSQ HOSP IP/OBS HIGH 50: CPT | Performed by: INTERNAL MEDICINE

## 2022-05-07 PROCEDURE — 2000000000 HC ICU R&B

## 2022-05-07 PROCEDURE — 80061 LIPID PANEL: CPT

## 2022-05-07 PROCEDURE — 94761 N-INVAS EAR/PLS OXIMETRY MLT: CPT

## 2022-05-07 PROCEDURE — 84134 ASSAY OF PREALBUMIN: CPT

## 2022-05-07 PROCEDURE — 2700000000 HC OXYGEN THERAPY PER DAY

## 2022-05-07 PROCEDURE — 94003 VENT MGMT INPAT SUBQ DAY: CPT

## 2022-05-07 PROCEDURE — 6360000002 HC RX W HCPCS: Performed by: INTERNAL MEDICINE

## 2022-05-07 PROCEDURE — 83735 ASSAY OF MAGNESIUM: CPT

## 2022-05-07 PROCEDURE — 6370000000 HC RX 637 (ALT 250 FOR IP): Performed by: NURSE PRACTITIONER

## 2022-05-07 PROCEDURE — A4216 STERILE WATER/SALINE, 10 ML: HCPCS | Performed by: INTERNAL MEDICINE

## 2022-05-07 PROCEDURE — 82803 BLOOD GASES ANY COMBINATION: CPT

## 2022-05-07 PROCEDURE — 82140 ASSAY OF AMMONIA: CPT

## 2022-05-07 PROCEDURE — 84100 ASSAY OF PHOSPHORUS: CPT

## 2022-05-07 PROCEDURE — 2500000003 HC RX 250 WO HCPCS: Performed by: INTERNAL MEDICINE

## 2022-05-07 PROCEDURE — 6370000000 HC RX 637 (ALT 250 FOR IP): Performed by: SPECIALIST

## 2022-05-07 PROCEDURE — 82962 GLUCOSE BLOOD TEST: CPT

## 2022-05-07 PROCEDURE — 6370000000 HC RX 637 (ALT 250 FOR IP): Performed by: INTERNAL MEDICINE

## 2022-05-07 PROCEDURE — 94640 AIRWAY INHALATION TREATMENT: CPT

## 2022-05-07 PROCEDURE — 2580000003 HC RX 258: Performed by: INTERNAL MEDICINE

## 2022-05-07 PROCEDURE — 85610 PROTHROMBIN TIME: CPT

## 2022-05-07 PROCEDURE — 80202 ASSAY OF VANCOMYCIN: CPT

## 2022-05-07 PROCEDURE — 71045 X-RAY EXAM CHEST 1 VIEW: CPT

## 2022-05-07 PROCEDURE — 2580000003 HC RX 258: Performed by: SPECIALIST

## 2022-05-07 PROCEDURE — 99291 CRITICAL CARE FIRST HOUR: CPT | Performed by: INTERNAL MEDICINE

## 2022-05-07 PROCEDURE — 80048 BASIC METABOLIC PNL TOTAL CA: CPT

## 2022-05-07 PROCEDURE — 99222 1ST HOSP IP/OBS MODERATE 55: CPT | Performed by: SPECIALIST

## 2022-05-07 PROCEDURE — 6360000002 HC RX W HCPCS: Performed by: HOSPITALIST

## 2022-05-07 PROCEDURE — 89220 SPUTUM SPECIMEN COLLECTION: CPT

## 2022-05-07 RX ORDER — ENOXAPARIN SODIUM 100 MG/ML
40 INJECTION SUBCUTANEOUS DAILY
Status: DISCONTINUED | OUTPATIENT
Start: 2022-05-08 | End: 2022-05-18 | Stop reason: HOSPADM

## 2022-05-07 RX ORDER — DEXMEDETOMIDINE HYDROCHLORIDE 4 UG/ML
.1-1.5 INJECTION, SOLUTION INTRAVENOUS CONTINUOUS
Status: DISCONTINUED | OUTPATIENT
Start: 2022-05-07 | End: 2022-05-11

## 2022-05-07 RX ORDER — MAGNESIUM SULFATE 1 G/100ML
1000 INJECTION INTRAVENOUS ONCE
Status: COMPLETED | OUTPATIENT
Start: 2022-05-07 | End: 2022-05-07

## 2022-05-07 RX ORDER — MAGNESIUM SULFATE 1 G/100ML
1000 INJECTION INTRAVENOUS PRN
Status: DISCONTINUED | OUTPATIENT
Start: 2022-05-07 | End: 2022-05-18 | Stop reason: HOSPADM

## 2022-05-07 RX ADMIN — INSULIN HUMAN 2 UNITS: 100 INJECTION, SOLUTION PARENTERAL at 13:07

## 2022-05-07 RX ADMIN — PROPOFOL 25 MCG/KG/MIN: 10 INJECTION, EMULSION INTRAVENOUS at 10:26

## 2022-05-07 RX ADMIN — ALBUTEROL SULFATE 4 PUFF: 90 AEROSOL, METERED RESPIRATORY (INHALATION) at 11:02

## 2022-05-07 RX ADMIN — DULOXETINE 60 MG: 30 CAPSULE, DELAYED RELEASE ORAL at 20:11

## 2022-05-07 RX ADMIN — CEFEPIME HYDROCHLORIDE 2000 MG: 2 INJECTION, POWDER, FOR SOLUTION INTRAVENOUS at 20:13

## 2022-05-07 RX ADMIN — CEFEPIME HYDROCHLORIDE 2000 MG: 2 INJECTION, POWDER, FOR SOLUTION INTRAVENOUS at 06:11

## 2022-05-07 RX ADMIN — RIFAXIMIN 550 MG: 550 TABLET ORAL at 20:12

## 2022-05-07 RX ADMIN — LACTULOSE 20 G: 10 SOLUTION ORAL at 14:30

## 2022-05-07 RX ADMIN — DEXMEDETOMIDINE HYDROCHLORIDE 0.2 MCG/KG/HR: 4 INJECTION, SOLUTION INTRAVENOUS at 16:34

## 2022-05-07 RX ADMIN — MAGNESIUM OXIDE 400 MG (241.3 MG MAGNESIUM) TABLET 200 MG: TABLET at 20:11

## 2022-05-07 RX ADMIN — LACTULOSE: 10 SOLUTION ORAL at 17:57

## 2022-05-07 RX ADMIN — SODIUM CHLORIDE, PRESERVATIVE FREE 20 MG: 5 INJECTION INTRAVENOUS at 08:28

## 2022-05-07 RX ADMIN — LACTULOSE: 10 SOLUTION ORAL at 00:51

## 2022-05-07 RX ADMIN — DEXMEDETOMIDINE HYDROCHLORIDE 0.6 MCG/KG/HR: 4 INJECTION, SOLUTION INTRAVENOUS at 23:01

## 2022-05-07 RX ADMIN — ALBUTEROL SULFATE 4 PUFF: 90 AEROSOL, METERED RESPIRATORY (INHALATION) at 20:05

## 2022-05-07 RX ADMIN — CHLORHEXIDINE GLUCONATE 0.12% ORAL RINSE 15 ML: 1.2 LIQUID ORAL at 08:28

## 2022-05-07 RX ADMIN — ALBUTEROL SULFATE 4 PUFF: 90 AEROSOL, METERED RESPIRATORY (INHALATION) at 15:19

## 2022-05-07 RX ADMIN — SODIUM CHLORIDE, PRESERVATIVE FREE 20 MG: 5 INJECTION INTRAVENOUS at 20:11

## 2022-05-07 RX ADMIN — Medication 100 MCG/HR: at 00:29

## 2022-05-07 RX ADMIN — SODIUM CHLORIDE, PRESERVATIVE FREE 10 ML: 5 INJECTION INTRAVENOUS at 08:31

## 2022-05-07 RX ADMIN — VANCOMYCIN HYDROCHLORIDE 1750 MG: 10 INJECTION, POWDER, LYOPHILIZED, FOR SOLUTION INTRAVENOUS at 11:10

## 2022-05-07 RX ADMIN — LACTULOSE: 10 SOLUTION ORAL at 13:09

## 2022-05-07 RX ADMIN — LACTULOSE 20 G: 10 SOLUTION ORAL at 20:11

## 2022-05-07 RX ADMIN — ALBUTEROL SULFATE 4 PUFF: 90 AEROSOL, METERED RESPIRATORY (INHALATION) at 03:34

## 2022-05-07 RX ADMIN — MAGNESIUM OXIDE 400 MG (241.3 MG MAGNESIUM) TABLET 200 MG: TABLET at 08:28

## 2022-05-07 RX ADMIN — MAGNESIUM SULFATE IN DEXTROSE 1000 MG: 10 INJECTION, SOLUTION INTRAVENOUS at 11:33

## 2022-05-07 RX ADMIN — ALBUTEROL SULFATE 4 PUFF: 90 AEROSOL, METERED RESPIRATORY (INHALATION) at 00:18

## 2022-05-07 RX ADMIN — Medication 1 MCG/MIN: at 19:35

## 2022-05-07 RX ADMIN — RIFAXIMIN 550 MG: 550 TABLET ORAL at 08:28

## 2022-05-07 RX ADMIN — LEVOTHYROXINE SODIUM 50 MCG: 25 TABLET ORAL at 06:12

## 2022-05-07 RX ADMIN — LACTULOSE 20 G: 10 SOLUTION ORAL at 06:10

## 2022-05-07 RX ADMIN — SPIRONOLACTONE 100 MG: 50 TABLET ORAL at 08:29

## 2022-05-07 RX ADMIN — CHLORHEXIDINE GLUCONATE 0.12% ORAL RINSE 15 ML: 1.2 LIQUID ORAL at 20:11

## 2022-05-07 RX ADMIN — FUROSEMIDE 5 MG/HR: 100 INJECTION, SOLUTION INTRAMUSCULAR; INTRAVENOUS at 02:51

## 2022-05-07 RX ADMIN — ALBUTEROL SULFATE 4 PUFF: 90 AEROSOL, METERED RESPIRATORY (INHALATION) at 23:05

## 2022-05-07 RX ADMIN — ALBUTEROL SULFATE 4 PUFF: 90 AEROSOL, METERED RESPIRATORY (INHALATION) at 07:24

## 2022-05-07 RX ADMIN — SODIUM CHLORIDE, PRESERVATIVE FREE 10 ML: 5 INJECTION INTRAVENOUS at 20:12

## 2022-05-07 RX ADMIN — CEFEPIME HYDROCHLORIDE 2000 MG: 2 INJECTION, POWDER, FOR SOLUTION INTRAVENOUS at 14:30

## 2022-05-07 RX ADMIN — PROPOFOL 35 MCG/KG/MIN: 10 INJECTION, EMULSION INTRAVENOUS at 02:41

## 2022-05-07 RX ADMIN — LACTULOSE: 10 SOLUTION ORAL at 06:36

## 2022-05-07 ASSESSMENT — PULMONARY FUNCTION TESTS
PIF_VALUE: 29
PIF_VALUE: 19
PIF_VALUE: 21
PIF_VALUE: 34
PIF_VALUE: 25
PIF_VALUE: 31
PIF_VALUE: 19
PIF_VALUE: 21
PIF_VALUE: 19
PIF_VALUE: 26
PIF_VALUE: 19
PIF_VALUE: 20
PIF_VALUE: 25
PIF_VALUE: 26
PIF_VALUE: 24
PIF_VALUE: 23
PIF_VALUE: 29
PIF_VALUE: 24
PIF_VALUE: 26
PIF_VALUE: 19
PIF_VALUE: 21
PIF_VALUE: 26
PIF_VALUE: 21
PIF_VALUE: 19
PIF_VALUE: 27
PIF_VALUE: 26
PIF_VALUE: 23
PIF_VALUE: 27
PIF_VALUE: 28
PIF_VALUE: 22
PIF_VALUE: 26
PIF_VALUE: 23
PIF_VALUE: 23
PIF_VALUE: 27
PIF_VALUE: 23
PIF_VALUE: 22
PIF_VALUE: 22
PIF_VALUE: 29
PIF_VALUE: 19
PIF_VALUE: 21
PIF_VALUE: 19
PIF_VALUE: 26
PIF_VALUE: 19
PIF_VALUE: 29
PIF_VALUE: 26
PIF_VALUE: 29
PIF_VALUE: 30
PIF_VALUE: 24
PIF_VALUE: 26
PIF_VALUE: 26
PIF_VALUE: 20
PIF_VALUE: 21
PIF_VALUE: 25
PIF_VALUE: 33
PIF_VALUE: 25
PIF_VALUE: 27
PIF_VALUE: 19
PIF_VALUE: 23
PIF_VALUE: 25
PIF_VALUE: 32
PIF_VALUE: 31
PIF_VALUE: 19
PIF_VALUE: 26
PIF_VALUE: 27

## 2022-05-07 ASSESSMENT — PAIN SCALES - GENERAL
PAINLEVEL_OUTOF10: 0

## 2022-05-07 NOTE — PROGRESS NOTES
Progress Note( Dr. Severo Massy)  5/7/2022  Subjective:   Admit Date: 5/2/2022  PCP: Saad Shelton MD    Admitted For : Shortness of breath with right-sided pleural effusion    Consulted For:  Better control of blood glucose      Interval History: Patient is sedated, intubated and ventilator   also started on tube feeding but due to high residual tube feeding is on hold now    . Intake/Output Summary (Last 24 hours) at 5/7/2022 1046  Last data filed at 5/7/2022 0653  Gross per 24 hour   Intake 2536.03 ml   Output 3355 ml   Net -818.97 ml       DATA    CBC:   Recent Labs     05/04/22  1142 05/05/22  1605   WBC 7.6 9.1   HGB 13.0* 11.0*    159    CMP:  Recent Labs     05/05/22  1605 05/05/22  1605 05/06/22  0440 05/07/22  0325 05/07/22  0900      < > 138 138 137   K 3.8   < > 3.2* 3.5 3.5   CL 98*   < > 99 99 98*   CO2 29   < > 29 28 28   BUN 17   < > 19 19 21   CREATININE 1.1   < > 1.1 1.2 1.3   CALCIUM 8.8   < > 9.1 8.4 8.6   PROT 6.3*  --   --   --   --    LABALBU 2.8*  --   --   --   --    BILITOT 1.1*  --   --   --   --    ALKPHOS 90  --   --   --   --    AST 34  --   --   --   --    ALT 18  --   --   --   --     < > = values in this interval not displayed. Lipids:   Lab Results   Component Value Date    CHOL 174 05/07/2022    CHOL 230 10/14/2019    HDL 28 05/07/2022    TRIG 130 05/07/2022     Glucose:  Recent Labs     05/06/22  1132 05/06/22  1719 05/07/22  0050   POCGLU 123* 94 94     [id number]:  Lab Results   Component Value Date    LABA1C 6.1 08/09/2021     High Sensitivity TSH:   Lab Results   Component Value Date    TSHHS 0.900 05/05/2022     Free T3: No results found for: FT3  Free T4:  Lab Results   Component Value Date    T4FREE 0.78 03/29/2022       XR CHEST PORTABLE   Preliminary Result   Bilateral pleural effusions and airspace opacities. Endotracheal tube tip is approximately 5 cm above the berny.          XR CHEST PORTABLE   Final Result   Improved aeration of the right lung field compared to 05/04/2022 with   persistent large areas of increased opacification right apical and right   mid-basilar regions consistent with underlying pulmonary consolidation,   mass/neoplasm, residual pleural fluid or thickening or asymmetric elevation   of left hemidiaphragm. No pneumothorax or procedure related complication   suggested      Consolidating infiltrate and/or moderate-sized pleural effusion left basilar   region. Stable borderline cardiomegaly, satisfactory position of nasogastric and   endotracheal tubes, soft tissue density right paratracheal region consistent   with mass/neoplasm, lymphadenopathy or area pulmonary consolidation measuring   5.5 cm in greatest cephalocaudal dimension. US CHEST INCLUDING MEDIASTINUM   Final Result   Ultrasound guidance provided for right thoracentesis. XR CHEST PORTABLE   Final Result   Near complete opacification of the right hemithorax with mildly improved   aeration. Mildly increased left-sided interstitial opacities favoring interstitial   edema. Unchanged position of support devices. CT ABDOMEN PELVIS WO CONTRAST Additional Contrast? None   Final Result   Complete opacification of the right hemithorax with a large right pleural   effusion, with compressive atelectasis of the right lung, and shift of the   mediastinum to the left. Minor atelectasis noted in the left lung base. Large amount of ascites. Evidence of the cirrhosis. Stable left renal cyst.      Stable mesenteric and retroperitoneal lymphadenopathy without change since   2019. XR ABDOMEN FOR NG/OG/NE TUBE PLACEMENT   Final Result   Satisfactory position lines and catheters. Right sided chest opacification. Question extrinsic to the patient         [address]   Final Result   Large right pleural effusion. CT HEAD WO CONTRAST   Final Result   1.  Motion compromised study but no acute intracranial abnormality. I would   suggest follow-up if symptoms persist.         IR US GUIDED PARACENTESIS   Final Result   Successful ultrasound-guided paracentesis with specimen sent for laboratory   evaluation. US DUP ABD PEL RETRO SCROT COMPLETE   Preliminary Result   1. Portal and hepatic veins appear grossly patent. 2. Cirrhosis without focal liver lesion demonstrated. 3. Small volume ascites. XR CHEST PORTABLE   Final Result   Significant progression of opacification of the right hemithorax, which is   now complete. This likely represent progressed pleural effusion and   atelectasis. Likely trace left pleural effusion, similar to prior         XR CHEST PORTABLE   Final Result   1. Status post right thoracentesis with new partial aeration of the right   lung. Large pleural effusion persists. 2. Small left pleural effusion. IR GUIDED THORACENTESIS PLEURAL   Final Result   Successful ultrasound guided right thoracentesis with specimen sent for   laboratory evaluation. .         XR CHEST PORTABLE   Final Result   Complete opacification of the right hemithorax likely due to a large right   pleural effusion and atelectasis.          CTA CHEST ABDOMEN PELVIS W CONTRAST    (Results Pending)   XR CHEST PORTABLE    (Results Pending)        Scheduled Medicines   Medications:    insulin regular  0-12 Units SubCUTAneous Q6H    magnesium sulfate  1,000 mg IntraVENous Once    lactulose enema   Rectal Q6H    cefepime  2,000 mg IntraVENous Q8H    vancomycin  1,750 mg IntraVENous Q24H    albuterol sulfate HFA  4 puff Inhalation Q4H    chlorhexidine  15 mL Mouth/Throat BID    famotidine (PEPCID) injection  20 mg IntraVENous BID    lidocaine PF  5 mL IntraDERmal Once    sodium chloride flush  5-40 mL IntraVENous 2 times per day    sodium chloride flush  5-40 mL IntraVENous 2 times per day    DULoxetine  60 mg Oral Daily    lactulose  20 g Oral TID    levothyroxine  50 mcg Oral Daily    magnesium oxide  200 mg Oral BID    nadolol  40 mg Oral Daily    rifAXIMin  550 mg Oral BID    spironolactone  100 mg Oral Daily    sodium chloride flush  5-40 mL IntraVENous 2 times per day    [Held by provider] enoxaparin  30 mg SubCUTAneous BID      Infusions:    furosemide (LASIX) 1mg/ml infusion 5 mg/hr (05/07/22 0653)    propofol Stopped (05/07/22 1029)    fentaNYL 25 mcg/hr (05/07/22 1021)    sodium chloride Stopped (05/06/22 1431)    norepinephrine 3 mcg/min (05/07/22 0752)    sodium chloride      sodium chloride           Objective:   Vitals: BP (!) 90/50   Pulse 76   Temp 98.6 °F (37 °C) (Rectal)   Resp 18   Ht 5' 7.99\" (1.727 m)   Wt 285 lb (129.3 kg)   SpO2 97%   BMI 43.34 kg/m²   General appearance: Patient is sedated, intubated and on ventilator also NG tube feeding  Neck: no JVD or bruit  Thyroid : Normal lobes   Lungs: Has Vesicular Breath sounds related diminished breath sound right-sided pleural effusion was aspirated earlier but it and again reaccumulated  Heart:  regular rate and rhythm  Abdomen: soft, non-tender; bowel sounds normal; no masses,  no organomegaly  Musculoskeletal: Normal  Extremities: extremities normal, , no edema  Neurologic:  Patient is sedated, intubated and on ventilator .     Assessment:     Patient Active Problem List:     Shortness of breath     History of colonic polyps     Alcoholic cirrhosis of liver with ascites (HCC)-Dr Jacques     Mixed hyperlipidemia     Hypertension     History of alcohol abuse     Gastroesophageal reflux disease without esophagitis     Anxiety     Pulmonary nodules     Hyperglycemia     Acquired hypothyroidism     Class 3 severe obesity with body mass index (BMI) of 40.0 to 44.9 in [facility])     Bilateral hearing loss     Increased ammonia level     JENNIFER (obstructive sleep apnea)     Portal hypertension (HCC)     SOB (shortness of breath)     Cirrhosis of liver with ascites (HCC)     Secondary esophageal varices without bleeding [facility])     Gastric polyps     Hearing loss     Sepsis ([facility]     Hepatic encephalopathy (HCC)     Pleural effusion     Acute on chronic respiratory failure with hypoxemia (HCC)     Acute respiratory failure ([facility]      Plan:     1. Reviewed POC blood glucose . Labs and X ray results   2. Reviewed Current Medicines   3. On correction bolus of regular insulin  4. Monitor Blood glucose frequently   5. Modified  the dose of Insulin/ other medicines as needed  6. Tube feeding is on hold  7. Will follow     .      Artem An MD, MD

## 2022-05-07 NOTE — PROGRESS NOTES
Pulmonary and Critical Care  Progress Note      VITALS:  BP (!) 90/50   Pulse 76   Temp 98.6 °F (37 °C) (Rectal)   Resp 19   Ht 5' 7.99\" (1.727 m)   Wt 285 lb (129.3 kg)   SpO2 93%   BMI 43.34 kg/m²     Subjective:   CHIEF COMPLAINT :SOB     HPI:                The patient is on the vent and sedated. He has minimal response to painful stimuli. Objective:   PHYSICAL EXAM:    LUNGS:Decreased air entry right base  Abd-distended, BS+,NT  Ext- no pedal edema  CVS-s1s2, no murmurs      DATA:    CBC:  Recent Labs     05/04/22  1142 05/05/22  1605   WBC 7.6 9.1   RBC 4.05* 3.38*   HGB 13.0* 11.0*   HCT 41.3* 33.8*    159   .0* 100.0   MCH 32.1* 32.5*   MCHC 31.5* 32.5   RDW 14.5 14.3   SEGSPCT  --  82.7*      BMP:  Recent Labs     05/06/22  0440 05/07/22  0325 05/07/22  0900    138 137   K 3.2* 3.5 3.5   CL 99 99 98*   CO2 29 28 28   BUN 19 19 21   CREATININE 1.1 1.2 1.3   CALCIUM 9.1 8.4 8.6   GLUCOSE 99 108* 122*      ABG:  Recent Labs     05/05/22  1345 05/06/22  0600 05/07/22  0600   PH 7.35 7.58* 7.48*   PO2ART 95 141* 70*   OWD5WCA 63.0* 36.0 43.0   O2SAT 94.9* 96.6 93.0*     BNP  No results found for: BNP   D-Dimer:  Lab Results   Component Value Date    DDIMER 1606 (H) 09/13/2021      Radiology:   Bilateral pleural effusions and airspace opacities.       Endotracheal tube tip is approximately 5 cm above the berny     1.        Assessment/Plan     Patient Active Problem List    Diagnosis Date Noted    Acute respiratory failure ([facility] 05/04/2022     Priority: Medium    Acute on chronic respiratory failure with hypoxemia (HCC)      Priority: Medium    Pleural effusion 05/02/2022     Priority: Medium    Sepsis ([facility] 03/19/2022    Hepatic encephalopathy ([facility] 03/19/2022    Hearing loss 03/11/2022    Cirrhosis of liver with ascites ([facility]     Secondary esophageal varices without bleeding (HCC)     Gastric polyps     SOB (shortness of breath) 09/13/2021    Portal hypertension ([facility] 08/30/2021    JENNIFER (obstructive sleep apnea) 08/09/2021    Bilateral hearing loss 05/07/2021    Increased ammonia level 05/07/2021    Class 3 severe obesity with body mass index (BMI) of 40.0 to 44.9 in adult [facility]) 09/21/2020    Hyperglycemia 12/04/2019    Acquired hypothyroidism 12/04/2019    Pulmonary nodules 42/77/6301    Alcoholic cirrhosis of liver with ascites (HCC)-Dr Jacques 08/08/2019    Mixed hyperlipidemia 08/08/2019    Hypertension 08/08/2019    History of alcohol abuse 08/08/2019    Gastroesophageal reflux disease without esophagitis 08/08/2019    Anxiety 08/08/2019    Shortness of breath 05/26/2019    History of colonic polyps 01/15/2019   Acute Hypoxic resp failure  Recurrent Right pleural effusion 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 JENNIFER  VDRF  Toxic Metabolic encephalopathy- improving       1. Diuresis  2. I/o chart  3. CXR in am  4. Lactulose  5. Check prealbumin level  6. Abx  7. F/u C&S  8. Tube feeds  9. PT/OT  10. Check Neuro status off sedation  11. Keep sats > 92%  12. SAT and SBT trial when more awake  13. Possible TIPS for the recurrent ascites and Hepatic hydorthorax  14. Prognosis guarded  15.  C.w present management                                                                                        D/w nursing and family and more than 30 minutes of CC time spent on the patient    Electronically signed by Josie Lamb MD on 5/7/2022 at 11:21 AM

## 2022-05-07 NOTE — PROGRESS NOTES
Remains intubated, sedated and on the vent-- after 2 hour sedation vacation, did not really respond much according to his nurse  Receiving lactulose enemas  Impression:    1) cirrhosis with ascites and likely PSE   2) large pleural effusion- need to decide if secondary to CHF, lung disease or hepatic hydrothorax- if felt to be hepatic hydrothorax, need to determine if candidate for TIPS         Plan:   1) continue cardio-pulmonary support then address above issues as able   2) will continue lactulose enemas for now

## 2022-05-07 NOTE — PLAN OF CARE
Problem: Discharge Planning  Goal: Discharge to home or other facility with appropriate resources  Outcome: Progressing     Problem: Pain  Goal: Verbalizes/displays adequate comfort level or baseline comfort level  Outcome: Progressing     Problem: Safety - Medical Restraint  Goal: Remains free of injury from restraints (Restraint for Interference with Medical Device)  Description: INTERVENTIONS:  1. Determine that other, less restrictive measures have been tried or would not be effective before applying the restraint  2. Evaluate the patient's condition at the time of restraint application  3. Inform patient/family regarding the reason for restraint  4.  Q2H: Monitor safety, psychosocial status, comfort, nutrition and hydration  Outcome: Progressing  Flowsheets  Taken 5/7/2022 1500  Remains free of injury from restraints (restraint for interference with medical device):   Determine that other, less restrictive measures have been tried or would not be effective before applying the restraint   Every 2 hours: Monitor safety, psychosocial status, comfort, nutrition and hydration  Taken 5/7/2022 1400  Remains free of injury from restraints (restraint for interference with medical device):   Determine that other, less restrictive measures have been tried or would not be effective before applying the restraint   Every 2 hours: Monitor safety, psychosocial status, comfort, nutrition and hydration  Taken 5/7/2022 1300  Remains free of injury from restraints (restraint for interference with medical device):   Determine that other, less restrictive measures have been tried or would not be effective before applying the restraint   Every 2 hours: Monitor safety, psychosocial status, comfort, nutrition and hydration  Taken 5/7/2022 1200  Remains free of injury from restraints (restraint for interference with medical device):   Determine that other, less restrictive measures have been tried or would not be effective before applying the restraint   Every 2 hours: Monitor safety, psychosocial status, comfort, nutrition and hydration  Taken 5/7/2022 1100  Remains free of injury from restraints (restraint for interference with medical device):   Determine that other, less restrictive measures have been tried or would not be effective before applying the restraint   Every 2 hours: Monitor safety, psychosocial status, comfort, nutrition and hydration  Taken 5/7/2022 1000  Remains free of injury from restraints (restraint for interference with medical device): Every 2 hours: Monitor safety, psychosocial status, comfort, nutrition and hydration  Taken 5/7/2022 0900  Remains free of injury from restraints (restraint for interference with medical device):   Determine that other, less restrictive measures have been tried or would not be effective before applying the restraint   Every 2 hours: Monitor safety, psychosocial status, comfort, nutrition and hydration  Taken 5/7/2022 0820  Remains free of injury from restraints (restraint for interference with medical device):   Determine that other, less restrictive measures have been tried or would not be effective before applying the restraint   Every 2 hours: Monitor safety, psychosocial status, comfort, nutrition and hydration     Problem: Nutrition Deficit:  Goal: Optimize nutritional status  Outcome: Progressing

## 2022-05-07 NOTE — PROGRESS NOTES
V2.0  [facility] [place] Hospitalist Progress Note      Name:  Hoa Buchanan /Age/Sex: 1945  (78 y.o. male)   MRN & CSN:  [id number] & [id number] Encounter Date/Time: 2022 2:19 PM EDT    Location:  -A PCP: Tyra Kendrick, [facility] Daily Day: 6    Assessment and Plan:   Hoa Buchanan is a 78 y.o. male who presents with pleural effusion      Plan:  1. Acute respiratory failure due to rhinovirus enterovirus and right hepatic hydrothorax:   -Intubated . Right thoracentesis : 2.2 L removed. Right thoracentesis  2 L removed. Respiratory PCR: Rhinovirus enterovirus positive. D/W pulmonology, Dr. Marcela Lord, feels that source of pleural effusion is due to hepatic hydrothorax. He feels this is due to consistency of fluid and elevated pressures during thoracentesis, and feels that pleural and paracentesis fluid studies appear similar. On vent FiO2 50%. Urine strep and Legionella negative. Pleural cytology : Negative for malignancy. Respiratory PCR: Rhinovirus enterovirus. 2. 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 Paracentesis : 6.9 L removed. PMN on ascites fluid less than 250, not suggestive of SBP. On Lasix drip. Net -3.6 L urine output. May need to be evaluated for possible TIPS procedure. 3. Acute hepatic encephalopathy  -on rifaximin and lactulose. 4. Hypotension:   -On Levophed. May be due to third spacing or sepsis, however there is not appear to be a focal source of. Check procalcitonin. 5. Hypomagnesemia  - start replacement protocol. History of CHF.   History of hypothyroidism    Diet ADULT TUBE FEEDING; Orogastric; Peptide Based High Protein; Continuous; 10; Yes; 10; Q 6 hours; 45; 30; Q 4 hours; Protein; Proteinex BID    DVT Prophylaxis [x] Lovenox, []  Heparin, [] SCDs, [] Ambulation,  [] Eliquis, [] Xarelto  [] Coumadin   Code Status Full Code   Disposition From: Home  Expected Disposition: Likely [facility]  Estimated Date of Discharge: 5/12/2022  Patient requires continued admission due to being mechanically ventilated and on pressors. Subjective/Interval history:   Chief Complaint: Shortness of Breath (reports difficulty breathing for the past 5-6 days and extremely tired)     5/7/22   Patient moves off sedation, but not following commands  On vent  On levophed  TF stopped due to 1000cc residual as per nurse  Making urine, on lasix drip. Review of Systems:    Negative unless mentioned above    Objective: Intake/Output Summary (Last 24 hours) at 5/7/2022 1419  Last data filed at 5/7/2022 0653  Gross per 24 hour   Intake 1479.11 ml   Output 3030 ml   Net -1550.89 ml        Vitals:   BP (!) 104/48   Pulse 76   Temp 98.6 °F (37 °C) (Rectal)   Resp 26   Ht 5' 7.99\" (1.727 m)   Wt 285 lb (129.3 kg)   SpO2 97%   BMI 43.34 kg/m²     Physical Exam:   General: on vent, moving all extremities  Eyes: closed  HENT: NCAT  Cardiovascular: RRR  Respiratory: wheeze on expiration on ventilator  Gastrointestinal: Soft, non tender, nondistended  Genitourinary: has silva  Musculoskeletal: trace pitting edema, nontender  Skin: warm, dry, no gross lesions  Neuro: Alert. No gross deficits  Psych: Mood appropriate.      Medications:   Medications:    insulin regular  0-12 Units SubCUTAneous Q6H    [START ON 5/8/2022] vancomycin  1,250 mg IntraVENous Q24H    lactulose enema   Rectal Q6H    cefepime  2,000 mg IntraVENous Q8H    albuterol sulfate HFA  4 puff Inhalation Q4H    chlorhexidine  15 mL Mouth/Throat BID    famotidine (PEPCID) injection  20 mg IntraVENous BID    lidocaine PF  5 mL IntraDERmal Once    sodium chloride flush  5-40 mL IntraVENous 2 times per day    sodium chloride flush  5-40 mL IntraVENous 2 times per day    DULoxetine  60 mg Oral Daily    lactulose  20 g Oral TID    levothyroxine  50 mcg Oral Daily    magnesium oxide  200 mg Oral BID    nadolol  40 mg Oral Daily    rifAXIMin  550 mg Oral BID    spironolactone  100 mg Oral Daily    sodium chloride flush  5-40 mL IntraVENous 2 times per day    [Held by provider] enoxaparin  30 mg SubCUTAneous BID      Infusions:    furosemide (LASIX) 1mg/ml infusion 5 mg/hr (05/07/22 0653)    propofol Stopped (05/07/22 1029)    fentaNYL Stopped (05/07/22 1125)    sodium chloride Stopped (05/06/22 1431)    norepinephrine 3 mcg/min (05/07/22 0752)    sodium chloride      sodium chloride       PRN Meds: sodium chloride flush, 5-40 mL, PRN  sodium chloride, , PRN  sodium phosphate IVPB, 15 mmol, PRN   Or  sodium phosphate IVPB, 15 mmol, PRN  sodium chloride flush, 5-40 mL, PRN  sodium chloride, , PRN  albuterol sulfate HFA, 2 puff, Q4H PRN  sodium chloride flush, 10 mL, PRN  sodium chloride, , PRN  nicotine polacrilex, 2 mg, Q1H PRN  acetaminophen, 650 mg, Q6H PRN   Or  acetaminophen, 650 mg, Q6H PRN  ondansetron, 4 mg, Q6H PRN  ALPRAZolam, 0.5 mg, Daily PRN        Labs      Recent Labs     05/05/22  1605   WBC 9.1   HGB 11.0*   HCT 33.8*         Recent Labs     05/06/22  0440 05/07/22  0325 05/07/22  0900    138 137   K 3.2* 3.5 3.5   CL 99 99 98*   CO2 29 28 28   PHOS 1.4* 2.3* 2.5   BUN 19 19 21   CREATININE 1.1 1.2 1.3     Recent Labs     05/05/22  1605   AST 34   ALT 18   BILITOT 1.1*   ALKPHOS 90     Recent Labs     05/06/22  0440 05/07/22  0325   INR 1.18 1.21     No results for input(s): CKTOTAL, CKMB, CKMBINDEX, TROPONINT in the last 72 hours.   Lab Results   Component Value Date    LABA1C 6.1 08/09/2021     CALCIUM:  8.6/28 (05/07 0900)  Lab Results   Component Value Date    MG 1.6 05/07/2022           Electronically signed by Juan Daniel Streeter MD on 5/7/2022 at 2:19 PM

## 2022-05-07 NOTE — PROGRESS NOTES
CARDIOLOGY  NOTE        Name:  Charity Landau /Age/Sex: 1945  (78 y.o. male)   MRN & CSN:  [id number] & [id number] Admission Date/Time: 2022 12:00 PM   Location:  -A PCP: Steve Ma, [address] Day: 6        PLAN FROM CARDIOLOGY FOR TODAY:   Continue supportive care      - cardiology consult is for: Possible CHF    -  Interval history: Thoracentesis performed few days ago    · ASSESSMENT/ PLAN:  1. Recurrent pleural effusions per pulmonary might benefit from pleurodesis  2. Cirrhosis per GI  3. Echo with normal EF          Subjective: Todays complain: Patient on vent    HPI:  Mariajose Dunaway is a 78 y. o.year old who and presents with had concerns including Shortness of Breath (reports difficulty breathing for the past 5-6 days and extremely tired). Chief Complaint   Patient presents with    Shortness of Breath     reports difficulty breathing for the past 5-6 days and extremely tired           Objective: Temperature:  Current - Temp: 98.6 °F (37 °C); Max - Temp  Av.7 °F (37.1 °C)  Min: 98.6 °F (37 °C)  Max: 98.8 °F (37.1 °C)    Respiratory Rate : Resp  Av.9  Min: 16  Max: 42    Pulse Range: Pulse  Av.8  Min: 69  Max: 93    Blood Presuure Range:  Systolic (89FJU), ACB:169 , Min:89 , QKV:816   ; Diastolic (43JJH), SEP:20, Min:43, Max:96      Pulse ox Range: SpO2  Av.7 %  Min: 91 %  Max: 100 %    24hr I & O:      Intake/Output Summary (Last 24 hours) at 2022 1617  Last data filed at 2022 1604  Gross per 24 hour   Intake 1311.11 ml   Output 2055 ml   Net -743.89 ml         BP (!) 115/55   Pulse 77   Temp 98.6 °F (37 °C) (Rectal)   Resp (!) 31   Ht 5' 7.99\" (1.727 m)   Wt 285 lb (129.3 kg)   SpO2 94%   BMI 43.34 kg/m²           Review of Systems:     On vent    TELEMETRY: Sinus    has a past medical history of Anxiety, Cirrhosis, alcoholic ([address] GERD (gastroesophageal reflux disease), GERD (gastroesophageal reflux disease), Hyperlipidemia, Hypertension, Portal hypertension ([address] Pulmonary nodules, Sleep apnea, and SOB (shortness of breath). has a past surgical history that includes Cholecystectomy; Vasectomy; Colonoscopy; Endoscopy, colon, diagnostic; Foot surgery; and Upper gastrointestinal endoscopy (N/A, 1/19/2022). Physical Exam:  General: On vent  Head:normal  Eye: Pupils equal and round  Neck:  No JVD, no carotid bruit noted   Chest:  Clear to auscultation, no signs of respiratory distress  Cardiovascular:  Normal rate and rhythm. S1 and S2 noted.  No murmurs rubs or gallops  Abdomen:   nontender  Extremities:  +2 edema  Pulses; palpable  Neuro: on vent    Medications:    insulin regular  0-12 Units SubCUTAneous Q6H    [START ON 5/8/2022] vancomycin  1,250 mg IntraVENous Q24H    [START ON 5/8/2022] enoxaparin  40 mg SubCUTAneous Daily    lactulose enema   Rectal Q6H    cefepime  2,000 mg IntraVENous Q8H    albuterol sulfate HFA  4 puff Inhalation Q4H    chlorhexidine  15 mL Mouth/Throat BID    famotidine (PEPCID) injection  20 mg IntraVENous BID    lidocaine PF  5 mL IntraDERmal Once    sodium chloride flush  5-40 mL IntraVENous 2 times per day    sodium chloride flush  5-40 mL IntraVENous 2 times per day    DULoxetine  60 mg Oral Daily    lactulose  20 g Oral TID    levothyroxine  50 mcg Oral Daily    magnesium oxide  200 mg Oral BID    nadolol  40 mg Oral Daily    rifAXIMin  550 mg Oral BID    spironolactone  100 mg Oral Daily    sodium chloride flush  5-40 mL IntraVENous 2 times per day      dexmedetomidine HCl in NaCl      furosemide (LASIX) 1mg/ml infusion 5 mg/hr (05/07/22 0653)    propofol Stopped (05/07/22 1029)    fentaNYL Stopped (05/07/22 1125)    sodium chloride Stopped (05/06/22 1431)    norepinephrine 2 mcg/min (05/07/22 1424)    sodium chloride      sodium chloride       magnesium sulfate, sodium chloride flush, sodium chloride, sodium phosphate IVPB **OR** sodium phosphate IVPB, sodium chloride flush, sodium chloride, albuterol sulfate HFA, sodium chloride flush, sodium chloride, nicotine polacrilex, acetaminophen **OR** acetaminophen, ondansetron, ALPRAZolam    Lab Data:  CBC:   Recent Labs     05/05/22  1605   WBC 9.1   HGB 11.0*   HCT 33.8*   .0        BMP:   Recent Labs     05/06/22  0440 05/07/22  0325 05/07/22  0900    138 137   K 3.2* 3.5 3.5   CL 99 99 98*   CO2 29 28 28   PHOS 1.4* 2.3* 2.5   BUN 19 19 21   CREATININE 1.1 1.2 1.3     LIVER PROFILE:   Recent Labs     05/05/22  1605   AST 34   ALT 18   BILITOT 1.1*   ALKPHOS 90     PT/INR:   Recent Labs     05/06/22  0440 05/07/22  0325   PROTIME 15.2* 15.6*   INR 1.18 1.21     APTT: No results for input(s): APTT in the last 72 hours. BNP:  No results for input(s): BNP in the last 72 hours. TROPONIN: No results for input(s): TROPONINI in the last 72 hours. No results for input(s): TROPONINT in the last 72 hours. Labs, consult, tests reviewed                    Tera Tidwell MD, PA-C 5/7/2022 4:17 PM     Please note this report has been partially produced using speech recognition software and may contain errors related to that system including errors in grammar, punctuation, and spelling, as well as words and phrases that may be inappropriate. If there are any questions or concerns please feel free to contact the dictating provider for clarification.

## 2022-05-07 NOTE — PROGRESS NOTES
[facility] [facility]  consulted by Dr. Mary Dominguez for monitoring and adjustment. Indication for treatment: CAP  Goal trough: [] 10-15 mcg/mL or [x] 15-20 mcg/ml  AUC/TRES: [] <500 or [x] 400-600    Pertinent Laboratory Values:   Temp Readings from Last 3 Encounters:   05/07/22 98.6 °F (37 °C) (Rectal)   04/25/22 97.2 °F (36.2 °C) (Infrared)   03/21/22 98.3 °F (36.8 °C) (Oral)     Recent Labs     05/05/22  1605   WBC 9.1     Recent Labs     05/06/22  0440 05/07/22  0325 05/07/22  0900   BUN 19 19 21   CREATININE 1.1 1.2 1.3     Estimated Creatinine Clearance: 65 mL/min (based on SCr of 1.3 mg/dL). Intake/Output Summary (Last 24 hours) at 5/7/2022 1206  Last data filed at 5/7/2022 8363  Gross per 24 hour   Intake 2536.03 ml   Output 3030 ml   Net -493.97 ml       Pertinent Cultures:  Date    Source    Results  5/2   Resp PCR   Rhinovirus  5/2   Anaerobic (chest)  Pending  5/2   Pleural Fluid   NGTD   5/5   Ascitic Fluid   Ordered  5/5   MRSA nasal   MSSA present  5/6   Thoracentesis Fluid  Pending  5/6   Sputum   Pending      Vancomycin level:   TROUGH:  No results for input(s): VANCOTROUGH in the last 72 hours. RANDOM:    Recent Labs     05/07/22  0325   VANCORANDOM 20.6       Assessment:  · SCr, BUN, and urine output: SCR trending up to 1.3 today, UOP ok  · Day(s) of therapy: 3 of 7  · Vancomycin concentration:  · 5/7 - 20.6, 16½ hour level on 1750mg q24h,     Plan:  · SCR trending up to 1.3 today  · Vanco level today predicts an AUC above target on the current regimen. · Decrease to vancomycin 1250mg ivpb q24h starting tomorrow for a predicted AUC of 516 at steady state. · Recheck the vanco level in 2 days. · Pharmacy will continue to monitor patient and adjust therapy as indicated    [address] 5/9 @ 06:00    Thank you for the consult,  Amparo Merrill.  Adams Sifuentes [facility]  5/7/2022 12:06 PM

## 2022-05-08 ENCOUNTER — APPOINTMENT (OUTPATIENT)
Dept: GENERAL RADIOLOGY | Age: 77
DRG: 432 | End: 2022-05-08
Payer: MEDICARE

## 2022-05-08 LAB
AMMONIA: 34 UMOL/L (ref 16–60)
ANION GAP SERPL CALCULATED.3IONS-SCNC: 10 MMOL/L (ref 4–16)
BASE EXCESS MIXED: 6.4 (ref 0–1.2)
BUN BLDV-MCNC: 23 MG/DL (ref 6–23)
CALCIUM SERPL-MCNC: 8.8 MG/DL (ref 8.3–10.6)
CARBON MONOXIDE, BLOOD: 2.6 % (ref 0–5)
CHLORIDE BLD-SCNC: 97 MMOL/L (ref 99–110)
CO2 CONTENT: 30.5 MMOL/L (ref 19–24)
CO2: 27 MMOL/L (ref 21–32)
COMMENT: ABNORMAL
CREAT SERPL-MCNC: 1.4 MG/DL (ref 0.9–1.3)
CULTURE: NORMAL
CULTURE: NORMAL
GFR AFRICAN AMERICAN: 60 ML/MIN/1.73M2
GFR NON-AFRICAN AMERICAN: 50 ML/MIN/1.73M2
GLUCOSE BLD-MCNC: 112 MG/DL (ref 70–99)
GLUCOSE BLD-MCNC: 117 MG/DL (ref 70–99)
GLUCOSE BLD-MCNC: 120 MG/DL (ref 70–99)
GLUCOSE BLD-MCNC: 126 MG/DL (ref 70–99)
GLUCOSE BLD-MCNC: 126 MG/DL (ref 70–99)
HCO3 ARTERIAL: 29.4 MMOL/L (ref 18–23)
HIGH SENSITIVE C-REACTIVE PROTEIN: 199.7 MG/L
INR BLD: 1.21 INDEX
Lab: NORMAL
Lab: NORMAL
MAGNESIUM: 1.7 MG/DL (ref 1.8–2.4)
METHEMOGLOBIN ARTERIAL: 1.2 %
O2 SATURATION: 92.8 % (ref 96–97)
PCO2 ARTERIAL: 36 MMHG (ref 32–45)
PH BLOOD: 7.52 (ref 7.34–7.45)
PHOSPHORUS: 2.7 MG/DL (ref 2.5–4.9)
PO2 ARTERIAL: 70 MMHG (ref 75–100)
POTASSIUM SERPL-SCNC: 4.1 MMOL/L (ref 3.5–5.1)
PREALBUMIN: 7 MG/DL (ref 20–40)
PROCALCITONIN: 0.31
PROTHROMBIN TIME: 15.6 SECONDS (ref 11.7–14.5)
SODIUM BLD-SCNC: 134 MMOL/L (ref 135–145)
SPECIMEN: NORMAL
SPECIMEN: NORMAL

## 2022-05-08 PROCEDURE — 94761 N-INVAS EAR/PLS OXIMETRY MLT: CPT

## 2022-05-08 PROCEDURE — 94640 AIRWAY INHALATION TREATMENT: CPT

## 2022-05-08 PROCEDURE — 94003 VENT MGMT INPAT SUBQ DAY: CPT

## 2022-05-08 PROCEDURE — 6370000000 HC RX 637 (ALT 250 FOR IP): Performed by: INTERNAL MEDICINE

## 2022-05-08 PROCEDURE — 82962 GLUCOSE BLOOD TEST: CPT

## 2022-05-08 PROCEDURE — 83735 ASSAY OF MAGNESIUM: CPT

## 2022-05-08 PROCEDURE — 89220 SPUTUM SPECIMEN COLLECTION: CPT

## 2022-05-08 PROCEDURE — 6360000002 HC RX W HCPCS: Performed by: HOSPITALIST

## 2022-05-08 PROCEDURE — 84100 ASSAY OF PHOSPHORUS: CPT

## 2022-05-08 PROCEDURE — 6360000002 HC RX W HCPCS: Performed by: INTERNAL MEDICINE

## 2022-05-08 PROCEDURE — 6370000000 HC RX 637 (ALT 250 FOR IP): Performed by: NURSE PRACTITIONER

## 2022-05-08 PROCEDURE — 6370000000 HC RX 637 (ALT 250 FOR IP): Performed by: SPECIALIST

## 2022-05-08 PROCEDURE — 99291 CRITICAL CARE FIRST HOUR: CPT | Performed by: INTERNAL MEDICINE

## 2022-05-08 PROCEDURE — 86141 C-REACTIVE PROTEIN HS: CPT

## 2022-05-08 PROCEDURE — 2580000003 HC RX 258: Performed by: INTERNAL MEDICINE

## 2022-05-08 PROCEDURE — 2700000000 HC OXYGEN THERAPY PER DAY

## 2022-05-08 PROCEDURE — 2500000003 HC RX 250 WO HCPCS: Performed by: INTERNAL MEDICINE

## 2022-05-08 PROCEDURE — 85610 PROTHROMBIN TIME: CPT

## 2022-05-08 PROCEDURE — 82803 BLOOD GASES ANY COMBINATION: CPT

## 2022-05-08 PROCEDURE — 80048 BASIC METABOLIC PNL TOTAL CA: CPT

## 2022-05-08 PROCEDURE — 99232 SBSQ HOSP IP/OBS MODERATE 35: CPT | Performed by: INTERNAL MEDICINE

## 2022-05-08 PROCEDURE — 71045 X-RAY EXAM CHEST 1 VIEW: CPT

## 2022-05-08 PROCEDURE — 2000000000 HC ICU R&B

## 2022-05-08 PROCEDURE — 74018 RADEX ABDOMEN 1 VIEW: CPT

## 2022-05-08 PROCEDURE — 82140 ASSAY OF AMMONIA: CPT

## 2022-05-08 PROCEDURE — A4216 STERILE WATER/SALINE, 10 ML: HCPCS | Performed by: INTERNAL MEDICINE

## 2022-05-08 PROCEDURE — 2580000003 HC RX 258: Performed by: SPECIALIST

## 2022-05-08 PROCEDURE — 84145 PROCALCITONIN (PCT): CPT

## 2022-05-08 PROCEDURE — 6360000002 HC RX W HCPCS: Performed by: NURSE PRACTITIONER

## 2022-05-08 PROCEDURE — 2580000003 HC RX 258: Performed by: NURSE PRACTITIONER

## 2022-05-08 PROCEDURE — 36600 WITHDRAWAL OF ARTERIAL BLOOD: CPT

## 2022-05-08 RX ORDER — POTASSIUM CHLORIDE 7.45 MG/ML
10 INJECTION INTRAVENOUS PRN
Status: DISCONTINUED | OUTPATIENT
Start: 2022-05-08 | End: 2022-05-09

## 2022-05-08 RX ORDER — METOCLOPRAMIDE HYDROCHLORIDE 5 MG/ML
10 INJECTION INTRAMUSCULAR; INTRAVENOUS EVERY 8 HOURS SCHEDULED
Status: COMPLETED | OUTPATIENT
Start: 2022-05-08 | End: 2022-05-10

## 2022-05-08 RX ORDER — FENTANYL CITRATE 50 UG/ML
25 INJECTION, SOLUTION INTRAMUSCULAR; INTRAVENOUS
Status: DISCONTINUED | OUTPATIENT
Start: 2022-05-08 | End: 2022-05-18

## 2022-05-08 RX ADMIN — POTASSIUM CHLORIDE 10 MEQ: 7.46 INJECTION, SOLUTION INTRAVENOUS at 03:32

## 2022-05-08 RX ADMIN — FUROSEMIDE 5 MG/HR: 100 INJECTION, SOLUTION INTRAMUSCULAR; INTRAVENOUS at 11:11

## 2022-05-08 RX ADMIN — POTASSIUM CHLORIDE 10 MEQ: 7.46 INJECTION, SOLUTION INTRAVENOUS at 03:33

## 2022-05-08 RX ADMIN — FENTANYL CITRATE 25 MCG: 50 INJECTION, SOLUTION INTRAMUSCULAR; INTRAVENOUS at 22:01

## 2022-05-08 RX ADMIN — FUROSEMIDE 5 MG/HR: 100 INJECTION, SOLUTION INTRAMUSCULAR; INTRAVENOUS at 01:00

## 2022-05-08 RX ADMIN — CHLORHEXIDINE GLUCONATE 0.12% ORAL RINSE 15 ML: 1.2 LIQUID ORAL at 08:29

## 2022-05-08 RX ADMIN — SODIUM CHLORIDE, PRESERVATIVE FREE 20 MG: 5 INJECTION INTRAVENOUS at 22:07

## 2022-05-08 RX ADMIN — METOCLOPRAMIDE 10 MG: 5 INJECTION, SOLUTION INTRAMUSCULAR; INTRAVENOUS at 17:36

## 2022-05-08 RX ADMIN — SODIUM CHLORIDE, PRESERVATIVE FREE 10 ML: 5 INJECTION INTRAVENOUS at 22:19

## 2022-05-08 RX ADMIN — DEXMEDETOMIDINE HYDROCHLORIDE 0.6 MCG/KG/HR: 4 INJECTION, SOLUTION INTRAVENOUS at 10:24

## 2022-05-08 RX ADMIN — ENOXAPARIN SODIUM 40 MG: 100 INJECTION SUBCUTANEOUS at 08:29

## 2022-05-08 RX ADMIN — ALBUTEROL SULFATE 4 PUFF: 90 AEROSOL, METERED RESPIRATORY (INHALATION) at 03:43

## 2022-05-08 RX ADMIN — VANCOMYCIN HYDROCHLORIDE 1250 MG: 5 INJECTION, POWDER, LYOPHILIZED, FOR SOLUTION INTRAVENOUS at 11:27

## 2022-05-08 RX ADMIN — SODIUM CHLORIDE 25 ML/HR: 9 INJECTION, SOLUTION INTRAVENOUS at 22:05

## 2022-05-08 RX ADMIN — Medication 4 MCG/MIN: at 13:48

## 2022-05-08 RX ADMIN — ALBUTEROL SULFATE 4 PUFF: 90 AEROSOL, METERED RESPIRATORY (INHALATION) at 11:31

## 2022-05-08 RX ADMIN — LACTULOSE 20 G: 10 SOLUTION ORAL at 06:04

## 2022-05-08 RX ADMIN — RIFAXIMIN 550 MG: 550 TABLET ORAL at 08:29

## 2022-05-08 RX ADMIN — DEXMEDETOMIDINE HYDROCHLORIDE 0.5 MCG/KG/HR: 4 INJECTION, SOLUTION INTRAVENOUS at 17:27

## 2022-05-08 RX ADMIN — MAGNESIUM OXIDE 400 MG (241.3 MG MAGNESIUM) TABLET 200 MG: TABLET at 22:06

## 2022-05-08 RX ADMIN — RIFAXIMIN 550 MG: 550 TABLET ORAL at 22:18

## 2022-05-08 RX ADMIN — FUROSEMIDE 5 MG/HR: 100 INJECTION, SOLUTION INTRAMUSCULAR; INTRAVENOUS at 21:21

## 2022-05-08 RX ADMIN — LACTULOSE: 10 SOLUTION ORAL at 06:04

## 2022-05-08 RX ADMIN — CHLORHEXIDINE GLUCONATE 0.12% ORAL RINSE 15 ML: 1.2 LIQUID ORAL at 22:20

## 2022-05-08 RX ADMIN — ALBUTEROL SULFATE 4 PUFF: 90 AEROSOL, METERED RESPIRATORY (INHALATION) at 15:32

## 2022-05-08 RX ADMIN — METOCLOPRAMIDE 10 MG: 5 INJECTION, SOLUTION INTRAMUSCULAR; INTRAVENOUS at 22:13

## 2022-05-08 RX ADMIN — ALBUTEROL SULFATE 4 PUFF: 90 AEROSOL, METERED RESPIRATORY (INHALATION) at 08:06

## 2022-05-08 RX ADMIN — CEFEPIME HYDROCHLORIDE 2000 MG: 2 INJECTION, POWDER, FOR SOLUTION INTRAVENOUS at 13:25

## 2022-05-08 RX ADMIN — ALBUTEROL SULFATE 4 PUFF: 90 AEROSOL, METERED RESPIRATORY (INHALATION) at 20:09

## 2022-05-08 RX ADMIN — LACTULOSE: 10 SOLUTION ORAL at 00:16

## 2022-05-08 RX ADMIN — DEXMEDETOMIDINE HYDROCHLORIDE 0.6 MCG/KG/HR: 4 INJECTION, SOLUTION INTRAVENOUS at 04:14

## 2022-05-08 RX ADMIN — ALBUTEROL SULFATE 4 PUFF: 90 AEROSOL, METERED RESPIRATORY (INHALATION) at 23:44

## 2022-05-08 RX ADMIN — POTASSIUM CHLORIDE 10 MEQ: 7.46 INJECTION, SOLUTION INTRAVENOUS at 03:30

## 2022-05-08 RX ADMIN — DEXMEDETOMIDINE HYDROCHLORIDE 1 MCG/KG/HR: 4 INJECTION, SOLUTION INTRAVENOUS at 21:20

## 2022-05-08 RX ADMIN — SODIUM CHLORIDE, PRESERVATIVE FREE 10 ML: 5 INJECTION INTRAVENOUS at 08:30

## 2022-05-08 RX ADMIN — CEFEPIME HYDROCHLORIDE 2000 MG: 2 INJECTION, POWDER, FOR SOLUTION INTRAVENOUS at 06:03

## 2022-05-08 RX ADMIN — SPIRONOLACTONE 100 MG: 50 TABLET ORAL at 08:29

## 2022-05-08 RX ADMIN — SODIUM CHLORIDE, PRESERVATIVE FREE 10 ML: 5 INJECTION INTRAVENOUS at 08:31

## 2022-05-08 RX ADMIN — DULOXETINE 60 MG: 30 CAPSULE, DELAYED RELEASE ORAL at 22:06

## 2022-05-08 RX ADMIN — LEVOTHYROXINE SODIUM 50 MCG: 25 TABLET ORAL at 06:03

## 2022-05-08 RX ADMIN — SODIUM CHLORIDE, PRESERVATIVE FREE 20 MG: 5 INJECTION INTRAVENOUS at 08:29

## 2022-05-08 RX ADMIN — CEFEPIME HYDROCHLORIDE 2000 MG: 2 INJECTION, POWDER, FOR SOLUTION INTRAVENOUS at 22:11

## 2022-05-08 RX ADMIN — ALPRAZOLAM 0.5 MG: 0.25 TABLET ORAL at 22:06

## 2022-05-08 RX ADMIN — LACTULOSE 20 G: 10 SOLUTION ORAL at 13:25

## 2022-05-08 RX ADMIN — MAGNESIUM OXIDE 400 MG (241.3 MG MAGNESIUM) TABLET 200 MG: TABLET at 08:29

## 2022-05-08 RX ADMIN — POTASSIUM CHLORIDE 10 MEQ: 7.46 INJECTION, SOLUTION INTRAVENOUS at 03:31

## 2022-05-08 ASSESSMENT — PULMONARY FUNCTION TESTS
PIF_VALUE: 17
PIF_VALUE: 24
PIF_VALUE: 18
PIF_VALUE: 20
PIF_VALUE: 19
PIF_VALUE: 22
PIF_VALUE: 21
PIF_VALUE: 19
PIF_VALUE: 21
PIF_VALUE: 19
PIF_VALUE: 19
PIF_VALUE: 20
PIF_VALUE: 30
PIF_VALUE: 22
PIF_VALUE: 17
PIF_VALUE: 19
PIF_VALUE: 19
PIF_VALUE: 18
PIF_VALUE: 23
PIF_VALUE: 25
PIF_VALUE: 18
PIF_VALUE: 21
PIF_VALUE: 19
PIF_VALUE: 27
PIF_VALUE: 22
PIF_VALUE: 19
PIF_VALUE: 28
PIF_VALUE: 18
PIF_VALUE: 20
PIF_VALUE: 21
PIF_VALUE: 18
PIF_VALUE: 19
PIF_VALUE: 18
PIF_VALUE: 21
PIF_VALUE: 19
PIF_VALUE: 17
PIF_VALUE: 30
PIF_VALUE: 18
PIF_VALUE: 20
PIF_VALUE: 21
PIF_VALUE: 19
PIF_VALUE: 21
PIF_VALUE: 31
PIF_VALUE: 22
PIF_VALUE: 22
PIF_VALUE: 18

## 2022-05-08 ASSESSMENT — PAIN SCALES - GENERAL
PAINLEVEL_OUTOF10: 6
PAINLEVEL_OUTOF10: 0
PAINLEVEL_OUTOF10: 6
PAINLEVEL_OUTOF10: 0
PAINLEVEL_OUTOF10: 0

## 2022-05-08 NOTE — PROGRESS NOTES
Pulmonary and Critical Care  Progress Note      VITALS:  /67   Pulse 66   Temp 98.1 °F (36.7 °C) (Rectal)   Resp 16   Ht 5' 7.99\" (1.727 m)   Wt 285 lb (129.3 kg)   SpO2 100%   BMI 43.34 kg/m²     Subjective:   CHIEF COMPLAINT :SOB     HPI:                The patient is on the vent and off sedation in the last 30 mins. He is responding to painful stimuli, but doesn't follow commands. Objective:   PHYSICAL EXAM:    LUNGS:Decreased air entry right base  Abd-distended, BS+,NT  Ext- no pedal edema  CVS-s1s2, no murmurs      DATA:    CBC:  Recent Labs     05/05/22  1605   WBC 9.1   RBC 3.38*   HGB 11.0*   HCT 33.8*      .0   MCH 32.5*   MCHC 32.5   RDW 14.3   SEGSPCT 82.7*      BMP:  Recent Labs     05/07/22  0900 05/07/22  2203 05/08/22  0650    131* 134*   K 3.5 3.4* 4.1   CL 98* 96* 97*   CO2 28 28 27   BUN 21 23 23   CREATININE 1.3 1.4* 1.4*   CALCIUM 8.6 8.7 8.8   GLUCOSE 122* 125* 126*      ABG:  Recent Labs     05/06/22  0600 05/07/22  0600 05/08/22  0600   PH 7.58* 7.48* 7.52*   PO2ART 141* 70* 70*   GVA2SSX 36.0 43.0 36.0   O2SAT 96.6 93.0* 92.8*     BNP  No results found for: BNP   D-Dimer:  Lab Results   Component Value Date    DDIMER 1606 (H) 09/13/2021      Radiology:   Diffuse opacities are seen within the right lung without significant change.       Tip of the ET tube is 5.9 cm above the berny     1.        Assessment/Plan     Patient Active Problem List    Diagnosis Date Noted    Acute respiratory failure ([address] 05/04/2022     Priority: Medium    Acute on chronic respiratory failure with hypoxemia (HCC)      Priority: Medium    Pleural effusion 05/02/2022     Priority: Medium    Sepsis ([address] 03/19/2022    Hepatic encephalopathy ([address] 03/19/2022    Hearing loss 03/11/2022    Cir[facility]osis of [facility] with ascites [facility]     Secondary esophageal varices without bleeding (HCC)     Gastric polyps     SOB (shortness of breath) 09/13/2021    [facility] hypertension [facility] 08/30/2021    JENNIFER (obstructive sleep apnea) 08/09/2021    Bilateral hearing loss 05/07/2021    Increased ammonia level 05/07/2021    Class 3 severe obesity with body mass index (BMI) of 40.0 to 44.9 in adult [facility]) 09/21/2020    Hyperglycemia 12/04/2019    Acquired hypothyroidism 12/04/2019    Pulmonary nodules 86/28/3192    Alcoholic cirrhosis of liver with ascites (HCC)-Dr Jacques 08/08/2019    Mixed hyperlipidemia 08/08/2019    Hypertension 08/08/2019    History of alcohol abuse 08/08/2019    Gastroesophageal reflux disease without esophagitis 08/08/2019    Anxiety 08/08/2019    Shortness of breath 05/26/2019    History of colonic polyps 01/15/2019   Acute Hypoxic resp failure  Recurrent Right pleural effusion 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 JENNIFER  VDRF  Toxic Metabolic encephalopathy       1. Reglan  2. Lactulose for 3 bowel movements per day  3. Abx  4. F/u C&S  5. Inhalers  6. Watch off sedation  7. Neurology consult  8. PT/OT  9. Tube feeds  10. Keep sats > 92%  11. Daily Neuro assessment  12. SAT and SBT trials  As tolerated  13. C/w present management  14.  Prognosis guarded                                                                                          D/w family and nursing and more than 30 minutes of CC time spent on the patient  Electronically signed by Kemal Ricks MD on 5/8/2022 at 12:54 PM

## 2022-05-08 NOTE — PROGRESS NOTES
Progress Note( Dr. Francisco Jimenez)  5/8/2022  Subjective:   Admit Date: 5/2/2022  PCP: Margret Zambrano MD    Admitted For : Shortness of breath with right-sided pleural effusion    Consulted For:  Better control of blood glucose      Interval History: Patient is sedated, intubated and ventilator  Continue to be holding off tube feeding as he still has high residual         Intake/Output Summary (Last 24 hours) at 5/8/2022 0959  Last data filed at 5/8/2022 0839  Gross per 24 hour   Intake 744.85 ml   Output 4360 ml   Net -3615.15 ml       DATA    CBC:   Recent Labs     05/05/22  1605   WBC 9.1   HGB 11.0*       CMP:  Recent Labs     05/05/22  1605 05/06/22  0440 05/07/22  0900 05/07/22  2203 05/08/22  0650      < > 137 131* 134*   K 3.8   < > 3.5 3.4* 4.1   CL 98*   < > 98* 96* 97*   CO2 29   < > 28 28 27   BUN 17   < > 21 23 23   CREATININE 1.1   < > 1.3 1.4* 1.4*   CALCIUM 8.8   < > 8.6 8.7 8.8   PROT 6.3*  --   --   --   --    LABALBU 2.8*  --   --   --   --    BILITOT 1.1*  --   --   --   --    ALKPHOS 90  --   --   --   --    AST 34  --   --   --   --    ALT 18  --   --   --   --     < > = values in this interval not displayed. Lipids:   Lab Results   Component Value Date    CHOL 174 05/07/2022    CHOL 230 10/14/2019    HDL 28 05/07/2022    TRIG 130 05/07/2022     Glucose:  Recent Labs     05/07/22  1752 05/08/22  0014 05/08/22  0756   POCGLU 97 126* 112*     [id number]:  Lab Results   Component Value Date    LABA1C 6.1 08/09/2021     High Sensitivity TSH:   Lab Results   Component Value Date    TSHHS 0.900 05/05/2022     Free T3: No results found for: FT3  Free T4:  Lab Results   Component Value Date    T4FREE 0.78 03/29/2022       XR CHEST PORTABLE   Final Result   Diffuse opacities are seen within the right lung without significant change. Tip of the ET tube is 5.9 cm above the berny. XR CHEST PORTABLE   Preliminary Result   Bilateral pleural effusions and airspace opacities. Endotracheal tube tip is approximately 5 cm above the berny. XR CHEST PORTABLE   Final Result   Improved aeration of the right lung field compared to 05/04/2022 with   persistent large areas of increased opacification right apical and right   mid-basilar regions consistent with underlying pulmonary consolidation,   mass/neoplasm, residual pleural fluid or thickening or asymmetric elevation   of left hemidiaphragm. No pneumothorax or procedure related complication   suggested      Consolidating infiltrate and/or moderate-sized pleural effusion left basilar   region. Stable borderline cardiomegaly, satisfactory position of nasogastric and   endotracheal tubes, soft tissue density right paratracheal region consistent   with mass/neoplasm, lymphadenopathy or area pulmonary consolidation measuring   5.5 cm in greatest cephalocaudal dimension. US CHEST INCLUDING MEDIASTINUM   Final Result   Ultrasound guidance provided for right thoracentesis. XR CHEST PORTABLE   Final Result   Near complete opacification of the right hemithorax with mildly improved   aeration. Mildly increased left-sided interstitial opacities favoring interstitial   edema. Unchanged position of support devices. CT ABDOMEN PELVIS WO CONTRAST Additional Contrast? None   Final Result   Complete opacification of the right hemithorax with a large right pleural   effusion, with compressive atelectasis of the right lung, and shift of the   mediastinum to the left. Minor atelectasis noted in the left lung base. Large amount of ascites. Evidence of the cirrhosis. Stable left renal cyst.      Stable mesenteric and retroperitoneal lymphadenopathy without change since   2019. XR ABDOMEN FOR NG/OG/NE TUBE PLACEMENT   Final Result   Satisfactory position lines and catheters. Right sided chest opacification.   Question extrinsic to the patient         [address]   Final Result   Large right pleural effusion. CT HEAD WO CONTRAST   Final Result   1. Motion compromised study but no acute intracranial abnormality. I would   suggest follow-up if symptoms persist.         IR US GUIDED PARACENTESIS   Final Result   Successful ultrasound-guided paracentesis with specimen sent for laboratory   evaluation. US DUP ABD PEL RETRO SCROT COMPLETE   Preliminary Result   1. Portal and hepatic veins appear grossly patent. 2. Cirrhosis without focal liver lesion demonstrated. 3. Small volume ascites. XR CHEST PORTABLE   Final Result   Significant progression of opacification of the right hemithorax, which is   now complete. This likely represent progressed pleural effusion and   atelectasis. Likely trace left pleural effusion, similar to prior         XR CHEST PORTABLE   Final Result   1. Status post right thoracentesis with new partial aeration of the right   lung. Large pleural effusion persists. 2. Small left pleural effusion. IR GUIDED THORACENTESIS PLEURAL   Final Result   Successful ultrasound guided right thoracentesis with specimen sent for   laboratory evaluation. .         XR CHEST PORTABLE   Final Result   Complete opacification of the right hemithorax likely due to a large right   pleural effusion and atelectasis.          CTA CHEST ABDOMEN PELVIS W CONTRAST    (Results Pending)   XR CHEST PORTABLE    (Results Pending)        Scheduled Medicines   Medications:    insulin regular  0-12 Units SubCUTAneous Q6H    vancomycin  1,250 mg IntraVENous Q24H    enoxaparin  40 mg SubCUTAneous Daily    lactulose enema   Rectal Q6H    cefepime  2,000 mg IntraVENous Q8H    albuterol sulfate HFA  4 puff Inhalation Q4H    chlorhexidine  15 mL Mouth/Throat BID    famotidine (PEPCID) injection  20 mg IntraVENous BID    lidocaine PF  5 mL IntraDERmal Once    sodium chloride flush  5-40 mL IntraVENous 2 times per day    sodium chloride flush  5-40 mL IntraVENous 2 times per day    DULoxetine  60 mg Oral Daily    lactulose  20 g Oral TID    levothyroxine  50 mcg Oral Daily    magnesium oxide  200 mg Oral BID    nadolol  40 mg Oral Daily    rifAXIMin  550 mg Oral BID    spironolactone  100 mg Oral Daily    sodium chloride flush  5-40 mL IntraVENous 2 times per day      Infusions:    dexmedetomidine HCl in NaCl 0.6 mcg/kg/hr (05/08/22 0414)    furosemide (LASIX) 1mg/ml infusion 5 mg/hr (05/08/22 0117)    sodium chloride Stopped (05/06/22 1431)    norepinephrine 2 mcg/min (05/08/22 0117)    sodium chloride      sodium chloride           Objective:   Vitals: /66   Pulse 76   Temp 97 °F (36.1 °C) (Rectal)   Resp 16   Ht 5' 7.99\" (1.727 m)   Wt 285 lb (129.3 kg)   SpO2 93%   BMI 43.34 kg/m²   General appearance: Patient is sedated, intubated and on ventilator also NG tube feeding  Neck: no JVD or bruit  Thyroid : Normal lobes   Lungs: Has Vesicular Breath sounds related diminished breath sound right-sided pleural effusion was aspirated earlier but it and again reaccumulated  Heart:  regular rate and rhythm  Abdomen: soft, non-tender; bowel sounds normal; no masses,  no organomegaly  Musculoskeletal: Normal  Extremities: extremities normal, , no edema  Neurologic:  Patient is sedated, intubated and on ventilator .     Assessment:     Patient Active Problem List:     Shortness of breath     History of colonic polyps     Alcoholic cirrhosis of liver with ascites (HCC)-Dr Jacques     Mixed hyperlipidemia     Hypertension     History of alcohol abuse     Gastroesophageal reflux disease without esophagitis     Anxiety     Pulmonary nodules     Hyperglycemia     Acquired hypothyroidism     Class 3 severe obesity with body mass index (BMI) of 40.0 to 44.9 in adult [facility])     Bilateral hearing loss     Increased ammonia level     JENNIFER (obstructive sleep apnea)     Portal hypertension (HCC)     SOB (shortness of breath)     Cirrhosis of liver with ascites [facility])     Secondary esophageal varices without bleeding (HCC)     Gastric polyps     Hearing loss     Sepsis (HCC)     Hepatic encephalopathy (HCC)     Pleural effusion     Acute on chronic respiratory failure with hypoxemia (HCC)     Acute respiratory failure (HCC)      Plan:     1. Reviewed POC blood glucose . Labs and X ray results   2. Reviewed Current Medicines   3. On correction bolus of regular insulin  4. Monitor Blood glucose frequently   5. Modified  the dose of Insulin/ other medicines as needed  6. Tube feeding is on hold  7. If unable to tolerate tube feeding should consider starting him on TPN  8. Will follow     .      Karo Chinchilla MD, MD

## 2022-05-08 NOTE — PROGRESS NOTES
[facility]  consulted by Dr. Wilder Shea for monitoring and adjustment. Indication for treatment: CAP  Goal trough: [] 10-15 mcg/mL or [x] 15-20 mcg/ml  AUC/TRES: [] <500 or [x] 400-600    Pertinent Laboratory Values:   Temp Readings from Last 3 Encounters:   05/08/22 99.1 °F (37.3 °C) (Rectal)   04/25/22 97.2 °F (36.2 °C) (Infrared)   03/21/22 98.3 °F (36.8 °C) (Oral)     No results for input(s): WBC, LACTATE in the last 72 hours. Recent Labs     05/07/22  0900 05/07/22  2203 05/08/22  0650   BUN 21 23 23   CREATININE 1.3 1.4* 1.4*     Estimated Creatinine Clearance: 61 mL/min (A) (based on SCr of 1.4 mg/dL (H)). Intake/Output Summary (Last 24 hours) at 5/8/2022 1651  Last data filed at 5/8/2022 1622  Gross per 24 hour   Intake 744.85 ml   Output 5515 ml   Net -4770.15 ml       Pertinent Cultures:  Date    Source    Results  5/2   Resp PCR   Rhinovirus  5/2   Anaerobic (chest)  Pending  5/2   Pleural Fluid   NGTD   5/5   Ascitic Fluid   Ordered  5/5   MRSA nasal   MSSA present  5/6   Thoracentesis Fluid  Pending  5/6   Sputum   Pending      Vancomycin level:   TROUGH:  No results for input(s): VANCOTROUGH in the last 72 hours. RANDOM:    Recent Labs     05/07/22  0325   VANCORANDOM 20.6       Assessment:  · SCr, BUN, and urine output: SCR trending up to 1.4 today, UOP good  · Day(s) of therapy: 4 of 7  · Vancomycin concentration:  · 5/7 - 20.6, 16½ hour level on 1750mg q24h,     Plan:  · SCR continues to trend up to 1.4 today  · Vanco level yesterday predicted an AUC above target on 1750mg q24h. · Decreased to vancomycin 1250mg ivpb q24h a predicted AUC of 547 at steady state. · Recheck the vanco level tomorrow am.  · Pharmacy will continue to monitor patient and adjust therapy as indicated    [address]elio 3 5/9 @ 06:00    Thank you for the consult,  Amairani Galaviz.  Alf Torres, [address] [place] [address]  5/8/2022 4:51 PM

## 2022-05-08 NOTE — PROGRESS NOTES
V2.0  [facility] Hospitalist Progress Note      Name:  Kanika Galicia /Age/Sex: 1945  (78 y.o. male)   MRN & CSN:  [id number] & [id number] Encounter Date/Time: 2022 2:19 PM EDT    Location:  -A PCP: Margret Zambrano [address] Day: 7    Assessment and Plan:   Kanika Galicia is a 78 y.o. male who presents with pleural effusion      Plan:  1. Acute respiratory failure due to rhinovirus enterovirus and right hepatic hydrothorax:   -Intubated . Right thoracentesis : 2.2 L removed. Right thoracentesis / 2 L removed. Respiratory PCR: Rhinovirus enterovirus positive. D/W pulmonology, Dr. Stormy Kwong, feels that source of pleural effusion is due to hepatic hydrothorax. He feels this is due to consistency of fluid and elevated pressures during thoracentesis, and feels that pleural and paracentesis fluid studies appear similar. Urine strep and Legionella negative. Pleural cytology : Negative for malignancy. Respiratory PCR: Rhinovirus enterovirus.  -Improved vent FiO2 45%. Respiratory culture : NGTD. Pleural culture : No growth. MRSA nares: Negative. 2. 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 Paracentesis : 6.9 L removed. PMN on ascites fluid less than 250, not suggestive of SBP. On Lasix drip. May need to be evaluated for possible TIPS procedure.  -Net -7.5 L output. Prealbumin 7. Ascites culture: None NGTD. 3. Acute hepatic encephalopathy  -on rifaximin and lactulose.  -Also on Precedex. NH3 34.  4. Hypotension:   -On Levophed. May be due to third spacing or sepsis, however there dells not appear to be a focal source of infection.   -Procalcitonin 0.313. .  5. Hypomagnesemia  - start replacement protocol.  -Magnesium 1.7. 6. CKD 3, baseline creatinine 1.1-1.4    History of CHF.   History of hypothyroidism    Diet ADULT TUBE FEEDING; Orogastric; Peptide Based High Protein; Continuous; 10; Yes; 10; Q 6 hours; 45; 30; Q 4 hours; Protein; Proteinex BID    DVT Prophylaxis [x] Lovenox, []  Heparin, [] SCDs, [] Ambulation,  [] Eliquis, [] Xarelto  [] Coumadin   Code Status Full Code   Disposition From: Home  Expected Disposition: Likely SNF  Estimated Date of Discharge: 5/12/2022  Patient requires continued admission due to being mechanically ventilated and on pressors. Subjective/Interval history:   Chief Complaint: Shortness of Breath (reports difficulty breathing for the past 5-6 days and extremely tired)     5/7/22   Patient moves off sedation, but not following commands  On vent  On levophed  TF stopped due to 1000cc residual as per nurse  Making urine, on lasix drip. 5/8/22   Patient moves but does not follow commands. He did open eyes once. On pressor  Making some urine  Having elevated residuals with TF. No BM     Review of Systems:    Negative unless mentioned above    Objective: Intake/Output Summary (Last 24 hours) at 5/8/2022 1349  Last data filed at 5/8/2022 1227  Gross per 24 hour   Intake 744.85 ml   Output 4630 ml   Net -3885.15 ml        Vitals:   BP (!) 74/44   Pulse 61   Temp 98.1 °F (36.7 °C) (Rectal)   Resp 16   Ht 5' 7.99\" (1.727 m)   Wt 285 lb (129.3 kg)   SpO2 96%   BMI 43.34 kg/m²     Physical Exam:   General: on vent, laying in bed  Eyes: closed  HENT: NCAT  Cardiovascular: RRR  Respiratory: wheeze, coarseness on expiration on ventilator  Gastrointestinal: Soft, non tender, nondistended  Genitourinary: has silva  Musculoskeletal: no pitting edema, nontender  Skin: warm, dry, no gross lesions  Neuro: not awake at this time  Psych: Mood appropriate.      Medications:   Medications:    insulin regular  0-12 Units SubCUTAneous Q6H    vancomycin  1,250 mg IntraVENous Q24H    enoxaparin  40 mg SubCUTAneous Daily    [Held by provider] lactulose enema   Rectal Q6H    cefepime  2,000 mg IntraVENous Q8H    albuterol sulfate HFA  4 puff Inhalation Q4H    chlorhexidine 15 mL Mouth/Throat BID    famotidine (PEPCID) injection  20 mg IntraVENous BID    lidocaine PF  5 mL IntraDERmal Once    sodium chloride flush  5-40 mL IntraVENous 2 times per day    sodium chloride flush  5-40 mL IntraVENous 2 times per day    DULoxetine  60 mg Oral Daily    lactulose  20 g Oral TID    levothyroxine  50 mcg Oral Daily    magnesium oxide  200 mg Oral BID    nadolol  40 mg Oral Daily    rifAXIMin  550 mg Oral BID    spironolactone  100 mg Oral Daily    sodium chloride flush  5-40 mL IntraVENous 2 times per day      Infusions:    dexmedetomidine HCl in NaCl 0.3 mcg/kg/hr (05/08/22 1224)    furosemide (LASIX) 1mg/ml infusion 5 mg/hr (05/08/22 1111)    sodium chloride Stopped (05/06/22 1431)    norepinephrine 4 mcg/min (05/08/22 1348)    sodium chloride      sodium chloride       PRN Meds: potassium chloride, 10 mEq, PRN  magnesium sulfate, 1,000 mg, PRN  sodium chloride flush, 5-40 mL, PRN  sodium chloride, , PRN  sodium phosphate IVPB, 15 mmol, PRN   Or  sodium phosphate IVPB, 15 mmol, PRN  sodium chloride flush, 5-40 mL, PRN  sodium chloride, , PRN  albuterol sulfate HFA, 2 puff, Q4H PRN  sodium chloride flush, 10 mL, PRN  sodium chloride, , PRN  nicotine polacrilex, 2 mg, Q1H PRN  acetaminophen, 650 mg, Q6H PRN   Or  acetaminophen, 650 mg, Q6H PRN  ondansetron, 4 mg, Q6H PRN  ALPRAZolam, 0.5 mg, Daily PRN        Labs      Recent Labs     05/05/22  1605   WBC 9.1   HGB 11.0*   HCT 33.8*         Recent Labs     05/07/22  0900 05/07/22  2203 05/08/22  0650    131* 134*   K 3.5 3.4* 4.1   CL 98* 96* 97*   CO2 28 28 27   PHOS 2.5 2.7 2.7   BUN 21 23 23   CREATININE 1.3 1.4* 1.4*     Recent Labs     05/05/22  1605   AST 34   ALT 18   BILITOT 1.1*   ALKPHOS 90     Recent Labs     05/06/22  0440 05/07/22  0325 05/08/22  0650   INR 1.18 1.21 1.21     No results for input(s): CKTOTAL, CKMB, CKMBINDEX, TROPONINT in the last 72 hours.   Lab Results   Component Value Date    LABA1C 6.1 08/09/2021     CALCIUM:  8.8/27 (05/08 0650)  Lab Results   Component Value Date    MG 1.7 05/08/2022           Electronically signed by Sandeep De La Cruz MD on 5/8/2022 at 1:49 PM

## 2022-05-08 NOTE — PLAN OF CARE
Problem: Discharge Planning  Goal: Discharge to home or other facility with appropriate resources  Outcome: Progressing     Problem: Pain  Goal: Verbalizes/displays adequate comfort level or baseline comfort level  Outcome: Progressing     Problem: Safety - Medical Restraint  Goal: Remains free of injury from restraints (Restraint for Interference with Medical Device)  Description: INTERVENTIONS:  1. Determine that other, less restrictive measures have been tried or would not be effective before applying the restraint  2. Evaluate the patient's condition at the time of restraint application  3. Inform patient/family regarding the reason for restraint  4.  Q2H: Monitor safety, psychosocial status, comfort, nutrition and hydration  Outcome: Progressing  Flowsheets  Taken 5/8/2022 0700 by Tamiko Reynolds RN  Remains free of injury from restraints (restraint for interference with medical device):   Determine that other, less restrictive measures have been tried or would not be effective before applying the restraint   Evaluate the patient's condition at the time of restraint application   Inform patient/family regarding the reason for restraint   Every 2 hours: Monitor safety, psychosocial status, comfort, nutrition and hydration  Taken 5/8/2022 0600 by Tamiko Reynolds RN  Remains free of injury from restraints (restraint for interference with medical device):   Determine that other, less restrictive measures have been tried or would not be effective before applying the restraint   Evaluate the patient's condition at the time of restraint application   Every 2 hours: Monitor safety, psychosocial status, comfort, nutrition and hydration   Inform patient/family regarding the reason for restraint  Taken 5/8/2022 0500 by Tamiko Reynolds RN  Remains free of injury from restraints (restraint for interference with medical device):   Determine that other, less restrictive measures have been tried or would not be effective before applying the restraint   Evaluate the patient's condition at the time of restraint application   Inform patient/family regarding the reason for restraint   Every 2 hours: Monitor safety, psychosocial status, comfort, nutrition and hydration  Taken 5/8/2022 0400 by Manjeet Chaparro RN  Remains free of injury from restraints (restraint for interference with medical device):   Determine that other, less restrictive measures have been tried or would not be effective before applying the restraint   Evaluate the patient's condition at the time of restraint application   Inform patient/family regarding the reason for restraint   Every 2 hours: Monitor safety, psychosocial status, comfort, nutrition and hydration  Taken 5/8/2022 0300 by Manjeet Chaparro RN  Remains free of injury from restraints (restraint for interference with medical device):   Determine that other, less restrictive measures have been tried or would not be effective before applying the restraint   Evaluate the patient's condition at the time of restraint application   Inform patient/family regarding the reason for restraint   Every 2 hours: Monitor safety, psychosocial status, comfort, nutrition and hydration  Taken 5/8/2022 0200 by Manjeet Chaparro RN  Remains free of injury from restraints (restraint for interference with medical device): Determine that other, less restrictive measures have been tried or would not be effective before applying the restraint  Taken 5/8/2022 0100 by Manjeet Chaparro, RN  Remains free of injury from restraints (restraint for interference with medical device):   Determine that other, less restrictive measures have been tried or would not be effective before applying the restraint   Evaluate the patient's condition at the time of restraint application   Inform patient/family regarding the reason for restraint   Every 2 hours: Monitor safety, psychosocial status, comfort, nutrition and hydration  Taken 5/8/2022 0000 by Charline Jane RN  Remains free of injury from restraints (restraint for interference with medical device):   Determine that other, less restrictive measures have been tried or would not be effective before applying the restraint   Every 2 hours: Monitor safety, psychosocial status, comfort, nutrition and hydration   Evaluate the patient's condition at the time of restraint application   Inform patient/family regarding the reason for restraint  Taken 5/7/2022 2300 by Charline Jane RN  Remains free of injury from restraints (restraint for interference with medical device): Determine that other, less restrictive measures have been tried or would not be effective before applying the restraint  Taken 5/7/2022 2200 by Charline Jane RN  Remains free of injury from restraints (restraint for interference with medical device): Determine that other, less restrictive measures have been tried or would not be effective before applying the restraint  Taken 5/7/2022 2100 by Charline Jane RN  Remains free of injury from restraints (restraint for interference with medical device): Determine that other, less restrictive measures have been tried or would not be effective before applying the restraint  Taken 5/7/2022 2000 by Charline Jane RN  Remains free of injury from restraints (restraint for interference with medical device): Determine that other, less restrictive measures have been tried or would not be effective before applying the restraint  Taken 5/7/2022 1900 by Jane Baker RN  Remains free of injury from restraints (restraint for interference with medical device):   Determine that other, less restrictive measures have been tried or would not be effective before applying the restraint   Every 2 hours: Monitor safety, psychosocial status, comfort, nutrition and hydration  Taken 5/7/2022 1800 by Jane Baker RN  Remains free of injury from restraints (restraint for interference with medical device):   Determine that other, less restrictive measures have been tried or would not be effective before applying the restraint   Every 2 hours: Monitor safety, psychosocial status, comfort, nutrition and hydration     Problem: Nutrition Deficit:  Goal: Optimize nutritional status  Outcome: Progressing     Problem: Safety - Adult  Goal: Free from fall injury  Outcome: Progressing     Problem: ABCDS Injury Assessment  Goal: Absence of physical injury  Outcome: Progressing     Problem: Skin/Tissue Integrity  Goal: Absence of new skin breakdown  Description: 1. Monitor for areas of redness and/or skin breakdown  2. Assess vascular access sites hourly  3. Every 4-6 hours minimum:  Change oxygen saturation probe site  4. Every 4-6 hours:  If on nasal continuous positive airway pressure, respiratory therapy assess nares and determine need for appliance change or resting period.   Outcome: Progressing

## 2022-05-08 NOTE — PROGRESS NOTES
CARDIOLOGY  NOTE        Name:  Celina Gastelum /Age/Sex: 1945  (78 y.o. male)   MRN & CSN:  [id number] & [id number] Admission Date/Time: 2022 12:00 PM   Location:  /-A PCP: Valentin Watkins, [address] Day: 7        PLAN FROM CARDIOLOGY FOR TODAY:   Continue supportive care      - cardiology consult is for: Possible CHF    -  Interval history: Remains on vent    · ASSESSMENT/ PLAN:  1. Recurrent pleural effusions per pulmonary might benefit from pleurodesis  2. Cirrhosis per GI  3. Echo with normal EF  4. Morbid obesity with a BMI of 43.34 suggestive of pickwickian syndrome          Subjective: Todays complain: Patient on vent    HPI:  Carmen Estes is a 78 y. o.year old who and presents with had concerns including Shortness of Breath (reports difficulty breathing for the past 5-6 days and extremely tired). Chief Complaint   Patient presents with    Shortness of Breath     reports difficulty breathing for the past 5-6 days and extremely tired           Objective: Temperature:  Current - Temp: 97 °F (36.1 °C); Max - Temp  Av.8 °F (36.6 °C)  Min: 97 °F (36.1 °C)  Max: 98.6 °F (37 °C)    Respiratory Rate : Resp  Av  Min: 14  Max: 38    Pulse Range: Pulse  Av.8  Min: 57  Max: 104    Blood Presuure Range:  Systolic (27LDC), FLP:240 , Min:63 , JWI:648   ; Diastolic (03MAD), FUU:54, Min:42, Max:78      Pulse ox Range: SpO2  Av %  Min: 89 %  Max: 98 %    24hr I & O:      Intake/Output Summary (Last 24 hours) at 2022 1224  Last data filed at 2022 0839  Gross per 24 hour   Intake 744.85 ml   Output 4360 ml   Net -3615.15 ml         /66   Pulse 65   Temp 97 °F (36.1 °C) (Rectal)   Resp 15   Ht 5' 7.99\" (1.727 m)   Wt 285 lb (129.3 kg)   SpO2 93%   BMI 43.34 kg/m²           Review of Systems:     On vent    TELEMETRY: Sinus    has a past medical history of Anxiety, Cirrhosis, alcoholic ([address] GERD (gastroesophageal reflux disease), GERD (gastroesophageal reflux disease), Hyperlipidemia, Hypertension, Portal hypertension ([address] Pulmonary nodules, Sleep apnea, and SOB (shortness of breath). has a past surgical history that includes Cholecystectomy; Vasectomy; Colonoscopy; Endoscopy, colon, diagnostic; Foot surgery; and Upper gastrointestinal endoscopy (N/A, 1/19/2022). Physical Exam:  General: On vent  Head:normal  Eye: Pupils equal and round  Neck:  No JVD, no carotid bruit noted   Chest:  Clear to auscultation, no signs of respiratory distress  Cardiovascular:  Normal rate and rhythm. S1 and S2 noted.  No murmurs rubs or gallops  Abdomen:   nontender  Extremities:  +2 edema  Pulses; palpable  Neuro: on vent    Medications:    insulin regular  0-12 Units SubCUTAneous Q6H    vancomycin  1,250 mg IntraVENous Q24H    enoxaparin  40 mg SubCUTAneous Daily    [Held by provider] lactulose enema   Rectal Q6H    cefepime  2,000 mg IntraVENous Q8H    albuterol sulfate HFA  4 puff Inhalation Q4H    chlorhexidine  15 mL Mouth/Throat BID    famotidine (PEPCID) injection  20 mg IntraVENous BID    lidocaine PF  5 mL IntraDERmal Once    sodium chloride flush  5-40 mL IntraVENous 2 times per day    sodium chloride flush  5-40 mL IntraVENous 2 times per day    DULoxetine  60 mg Oral Daily    lactulose  20 g Oral TID    levothyroxine  50 mcg Oral Daily    magnesium oxide  200 mg Oral BID    nadolol  40 mg Oral Daily    rifAXIMin  550 mg Oral BID    spironolactone  100 mg Oral Daily    sodium chloride flush  5-40 mL IntraVENous 2 times per day      dexmedetomidine HCl in NaCl Stopped (05/08/22 1131)    furosemide (LASIX) 1mg/ml infusion 5 mg/hr (05/08/22 1111)    sodium chloride Stopped (05/06/22 1431)    norepinephrine 2 mcg/min (05/08/22 0117)    sodium chloride      sodium chloride       potassium chloride, magnesium sulfate, sodium chloride flush, sodium chloride, sodium phosphate IVPB **OR** sodium phosphate IVPB, sodium chloride flush, sodium chloride, albuterol sulfate HFA, sodium chloride flush, sodium chloride, nicotine polacrilex, acetaminophen **OR** acetaminophen, ondansetron, ALPRAZolam    Lab Data:  CBC:   Recent Labs     05/05/22  1605   WBC 9.1   HGB 11.0*   HCT 33.8*   .0        BMP:   Recent Labs     05/07/22  0900 05/07/22  2203 05/08/22  0650    131* 134*   K 3.5 3.4* 4.1   CL 98* 96* 97*   CO2 28 28 27   PHOS 2.5 2.7 2.7   BUN 21 23 23   CREATININE 1.3 1.4* 1.4*     LIVER PROFILE:   Recent Labs     05/05/22  1605   AST 34   ALT 18   BILITOT 1.1*   ALKPHOS 90     PT/INR:   Recent Labs     05/06/22  0440 05/07/22  0325 05/08/22  0650   PROTIME 15.2* 15.6* 15.6*   INR 1.18 1.21 1.21     APTT: No results for input(s): APTT in the last 72 hours. BNP:  No results for input(s): BNP in the last 72 hours. TROPONIN: No results for input(s): TROPONINI in the last 72 hours. No results for input(s): TROPONINT in the last 72 hours. Labs, consult, tests reviewed                    Jay Fuentes MD, PA-C 5/8/2022 12:24 PM     Please note this report has been partially produced using speech recognition software and may contain errors related to that system including errors in grammar, punctuation, and spelling, as well as words and phrases that may be inappropriate. If there are any questions or concerns please feel free to contact the dictating provider for clarification.

## 2022-05-09 ENCOUNTER — APPOINTMENT (OUTPATIENT)
Dept: GENERAL RADIOLOGY | Age: 77
DRG: 432 | End: 2022-05-09
Payer: MEDICARE

## 2022-05-09 LAB
AMMONIA: 82 UMOL/L (ref 16–60)
ANION GAP SERPL CALCULATED.3IONS-SCNC: 11 MMOL/L (ref 4–16)
ANION GAP SERPL CALCULATED.3IONS-SCNC: 2 MMOL/L (ref 4–16)
BUN BLDV-MCNC: 22 MG/DL (ref 6–23)
BUN BLDV-MCNC: 23 MG/DL (ref 6–23)
CALCIUM SERPL-MCNC: 7.8 MG/DL (ref 8.3–10.6)
CALCIUM SERPL-MCNC: 8.3 MG/DL (ref 8.3–10.6)
CHLORIDE BLD-SCNC: 104 MMOL/L (ref 99–110)
CHLORIDE BLD-SCNC: 99 MMOL/L (ref 99–110)
CO2: 22 MMOL/L (ref 21–32)
CO2: 26 MMOL/L (ref 21–32)
CREAT SERPL-MCNC: 1.2 MG/DL (ref 0.9–1.3)
CREAT SERPL-MCNC: 1.2 MG/DL (ref 0.9–1.3)
DOSE AMOUNT: NORMAL
DOSE TIME: NORMAL
GFR AFRICAN AMERICAN: >60 ML/MIN/1.73M2
GFR AFRICAN AMERICAN: >60 ML/MIN/1.73M2
GFR NON-AFRICAN AMERICAN: 59 ML/MIN/1.73M2
GFR NON-AFRICAN AMERICAN: 59 ML/MIN/1.73M2
GLUCOSE BLD-MCNC: 102 MG/DL (ref 70–99)
GLUCOSE BLD-MCNC: 116 MG/DL (ref 70–99)
GLUCOSE BLD-MCNC: 135 MG/DL (ref 70–99)
GLUCOSE BLD-MCNC: 152 MG/DL (ref 70–99)
GLUCOSE BLD-MCNC: 359 MG/DL (ref 70–99)
HIGH SENSITIVE C-REACTIVE PROTEIN: 185.4 MG/L
INR BLD: 1.22 INDEX
MAGNESIUM: 1.6 MG/DL (ref 1.8–2.4)
PHOSPHORUS: 2.3 MG/DL (ref 2.5–4.9)
POTASSIUM SERPL-SCNC: 3.1 MMOL/L (ref 3.5–5.1)
POTASSIUM SERPL-SCNC: 3.6 MMOL/L (ref 3.5–5.1)
PROTHROMBIN TIME: 15.8 SECONDS (ref 11.7–14.5)
SODIUM BLD-SCNC: 128 MMOL/L (ref 135–145)
SODIUM BLD-SCNC: 136 MMOL/L (ref 135–145)
VANCOMYCIN RANDOM: 17.2 UG/ML

## 2022-05-09 PROCEDURE — 6360000002 HC RX W HCPCS

## 2022-05-09 PROCEDURE — 82962 GLUCOSE BLOOD TEST: CPT

## 2022-05-09 PROCEDURE — 36592 COLLECT BLOOD FROM PICC: CPT

## 2022-05-09 PROCEDURE — 2700000000 HC OXYGEN THERAPY PER DAY

## 2022-05-09 PROCEDURE — 99291 CRITICAL CARE FIRST HOUR: CPT | Performed by: INTERNAL MEDICINE

## 2022-05-09 PROCEDURE — 2500000003 HC RX 250 WO HCPCS: Performed by: INTERNAL MEDICINE

## 2022-05-09 PROCEDURE — 6370000000 HC RX 637 (ALT 250 FOR IP): Performed by: NURSE PRACTITIONER

## 2022-05-09 PROCEDURE — 2580000003 HC RX 258: Performed by: INTERNAL MEDICINE

## 2022-05-09 PROCEDURE — 80048 BASIC METABOLIC PNL TOTAL CA: CPT

## 2022-05-09 PROCEDURE — 86141 C-REACTIVE PROTEIN HS: CPT

## 2022-05-09 PROCEDURE — 82140 ASSAY OF AMMONIA: CPT

## 2022-05-09 PROCEDURE — 6360000002 HC RX W HCPCS: Performed by: NURSE PRACTITIONER

## 2022-05-09 PROCEDURE — 2500000003 HC RX 250 WO HCPCS: Performed by: NURSE PRACTITIONER

## 2022-05-09 PROCEDURE — 2000000000 HC ICU R&B

## 2022-05-09 PROCEDURE — 94761 N-INVAS EAR/PLS OXIMETRY MLT: CPT

## 2022-05-09 PROCEDURE — 99231 SBSQ HOSP IP/OBS SF/LOW 25: CPT | Performed by: SPECIALIST

## 2022-05-09 PROCEDURE — 84100 ASSAY OF PHOSPHORUS: CPT

## 2022-05-09 PROCEDURE — 6370000000 HC RX 637 (ALT 250 FOR IP): Performed by: INTERNAL MEDICINE

## 2022-05-09 PROCEDURE — 51702 INSERT TEMP BLADDER CATH: CPT

## 2022-05-09 PROCEDURE — 2580000003 HC RX 258: Performed by: NURSE PRACTITIONER

## 2022-05-09 PROCEDURE — 6360000002 HC RX W HCPCS: Performed by: HOSPITALIST

## 2022-05-09 PROCEDURE — 99223 1ST HOSP IP/OBS HIGH 75: CPT | Performed by: STUDENT IN AN ORGANIZED HEALTH CARE EDUCATION/TRAINING PROGRAM

## 2022-05-09 PROCEDURE — 82803 BLOOD GASES ANY COMBINATION: CPT

## 2022-05-09 PROCEDURE — 87070 CULTURE OTHR SPECIMN AEROBIC: CPT

## 2022-05-09 PROCEDURE — 6360000002 HC RX W HCPCS: Performed by: INTERNAL MEDICINE

## 2022-05-09 PROCEDURE — 71045 X-RAY EXAM CHEST 1 VIEW: CPT

## 2022-05-09 PROCEDURE — 85610 PROTHROMBIN TIME: CPT

## 2022-05-09 PROCEDURE — 87205 SMEAR GRAM STAIN: CPT

## 2022-05-09 PROCEDURE — 83735 ASSAY OF MAGNESIUM: CPT

## 2022-05-09 PROCEDURE — A4216 STERILE WATER/SALINE, 10 ML: HCPCS | Performed by: INTERNAL MEDICINE

## 2022-05-09 PROCEDURE — 80202 ASSAY OF VANCOMYCIN: CPT

## 2022-05-09 PROCEDURE — 94003 VENT MGMT INPAT SUBQ DAY: CPT

## 2022-05-09 PROCEDURE — 94640 AIRWAY INHALATION TREATMENT: CPT

## 2022-05-09 RX ORDER — SODIUM CHLORIDE 1000 MG
1 TABLET, SOLUBLE MISCELLANEOUS 3 TIMES DAILY
Status: DISCONTINUED | OUTPATIENT
Start: 2022-05-09 | End: 2022-05-16

## 2022-05-09 RX ORDER — PROPOFOL 10 MG/ML
INJECTION, EMULSION INTRAVENOUS
Status: COMPLETED
Start: 2022-05-09 | End: 2022-05-09

## 2022-05-09 RX ORDER — POTASSIUM CHLORIDE 7.45 MG/ML
10 INJECTION INTRAVENOUS PRN
Status: DISCONTINUED | OUTPATIENT
Start: 2022-05-09 | End: 2022-05-18

## 2022-05-09 RX ORDER — PROPOFOL 10 MG/ML
5-50 INJECTION, EMULSION INTRAVENOUS CONTINUOUS
Status: DISCONTINUED | OUTPATIENT
Start: 2022-05-09 | End: 2022-05-09

## 2022-05-09 RX ORDER — POTASSIUM CHLORIDE 29.8 MG/ML
20 INJECTION INTRAVENOUS PRN
Status: DISCONTINUED | OUTPATIENT
Start: 2022-05-09 | End: 2022-05-18

## 2022-05-09 RX ORDER — PROPOFOL 10 MG/ML
5-50 INJECTION, EMULSION INTRAVENOUS CONTINUOUS
Status: DISCONTINUED | OUTPATIENT
Start: 2022-05-09 | End: 2022-05-11

## 2022-05-09 RX ADMIN — ENOXAPARIN SODIUM 40 MG: 100 INJECTION SUBCUTANEOUS at 08:49

## 2022-05-09 RX ADMIN — LACTULOSE 20 G: 10 SOLUTION ORAL at 05:47

## 2022-05-09 RX ADMIN — METOCLOPRAMIDE 10 MG: 5 INJECTION, SOLUTION INTRAMUSCULAR; INTRAVENOUS at 14:27

## 2022-05-09 RX ADMIN — FENTANYL CITRATE 25 MCG: 50 INJECTION, SOLUTION INTRAMUSCULAR; INTRAVENOUS at 05:48

## 2022-05-09 RX ADMIN — DEXMEDETOMIDINE HYDROCHLORIDE 1.1 MCG/KG/HR: 4 INJECTION, SOLUTION INTRAVENOUS at 00:43

## 2022-05-09 RX ADMIN — MAGNESIUM OXIDE 400 MG (241.3 MG MAGNESIUM) TABLET 200 MG: TABLET at 08:49

## 2022-05-09 RX ADMIN — SODIUM CHLORIDE 1 G: 1 TABLET ORAL at 23:41

## 2022-05-09 RX ADMIN — SPIRONOLACTONE 100 MG: 50 TABLET ORAL at 08:49

## 2022-05-09 RX ADMIN — SODIUM CHLORIDE, PRESERVATIVE FREE 10 ML: 5 INJECTION INTRAVENOUS at 03:05

## 2022-05-09 RX ADMIN — SODIUM CHLORIDE, PRESERVATIVE FREE 10 ML: 5 INJECTION INTRAVENOUS at 05:48

## 2022-05-09 RX ADMIN — CHLORHEXIDINE GLUCONATE 0.12% ORAL RINSE 15 ML: 1.2 LIQUID ORAL at 23:39

## 2022-05-09 RX ADMIN — DEXMEDETOMIDINE HYDROCHLORIDE 1.1 MCG/KG/HR: 4 INJECTION, SOLUTION INTRAVENOUS at 04:04

## 2022-05-09 RX ADMIN — SODIUM CHLORIDE, PRESERVATIVE FREE 20 MG: 5 INJECTION INTRAVENOUS at 23:40

## 2022-05-09 RX ADMIN — SODIUM CHLORIDE, PRESERVATIVE FREE 10 ML: 5 INJECTION INTRAVENOUS at 09:08

## 2022-05-09 RX ADMIN — SODIUM CHLORIDE, PRESERVATIVE FREE 20 MG: 5 INJECTION INTRAVENOUS at 08:50

## 2022-05-09 RX ADMIN — INSULIN HUMAN 2 UNITS: 100 INJECTION, SOLUTION PARENTERAL at 12:39

## 2022-05-09 RX ADMIN — MAGNESIUM OXIDE 400 MG (241.3 MG MAGNESIUM) TABLET 200 MG: TABLET at 23:40

## 2022-05-09 RX ADMIN — DEXMEDETOMIDINE HYDROCHLORIDE 0.7 MCG/KG/HR: 4 INJECTION, SOLUTION INTRAVENOUS at 23:39

## 2022-05-09 RX ADMIN — CEFEPIME HYDROCHLORIDE 2000 MG: 2 INJECTION, POWDER, FOR SOLUTION INTRAVENOUS at 14:23

## 2022-05-09 RX ADMIN — ALBUTEROL SULFATE 4 PUFF: 90 AEROSOL, METERED RESPIRATORY (INHALATION) at 15:22

## 2022-05-09 RX ADMIN — POTASSIUM CHLORIDE 20 MEQ: 29.8 INJECTION INTRAVENOUS at 15:57

## 2022-05-09 RX ADMIN — PROPOFOL 10 MCG/KG/MIN: 10 INJECTION, EMULSION INTRAVENOUS at 09:20

## 2022-05-09 RX ADMIN — FUROSEMIDE 5 MG/HR: 100 INJECTION, SOLUTION INTRAMUSCULAR; INTRAVENOUS at 18:42

## 2022-05-09 RX ADMIN — MAGNESIUM SULFATE IN DEXTROSE 1000 MG: 10 INJECTION, SOLUTION INTRAVENOUS at 10:31

## 2022-05-09 RX ADMIN — VANCOMYCIN HYDROCHLORIDE 1250 MG: 5 INJECTION, POWDER, LYOPHILIZED, FOR SOLUTION INTRAVENOUS at 10:34

## 2022-05-09 RX ADMIN — ALBUTEROL SULFATE 4 PUFF: 90 AEROSOL, METERED RESPIRATORY (INHALATION) at 07:52

## 2022-05-09 RX ADMIN — DULOXETINE 60 MG: 30 CAPSULE, DELAYED RELEASE ORAL at 23:40

## 2022-05-09 RX ADMIN — RIFAXIMIN 550 MG: 550 TABLET ORAL at 08:49

## 2022-05-09 RX ADMIN — CEFEPIME HYDROCHLORIDE 2000 MG: 2 INJECTION, POWDER, FOR SOLUTION INTRAVENOUS at 05:53

## 2022-05-09 RX ADMIN — LACTULOSE 20 G: 10 SOLUTION ORAL at 14:27

## 2022-05-09 RX ADMIN — DEXMEDETOMIDINE HYDROCHLORIDE 1.1 MCG/KG/HR: 4 INJECTION, SOLUTION INTRAVENOUS at 05:56

## 2022-05-09 RX ADMIN — ALBUTEROL SULFATE 4 PUFF: 90 AEROSOL, METERED RESPIRATORY (INHALATION) at 04:16

## 2022-05-09 RX ADMIN — ALBUTEROL SULFATE 4 PUFF: 90 AEROSOL, METERED RESPIRATORY (INHALATION) at 11:34

## 2022-05-09 RX ADMIN — LACTULOSE 20 G: 10 SOLUTION ORAL at 23:39

## 2022-05-09 RX ADMIN — LEVOTHYROXINE SODIUM 50 MCG: 25 TABLET ORAL at 05:47

## 2022-05-09 RX ADMIN — METOCLOPRAMIDE 10 MG: 5 INJECTION, SOLUTION INTRAMUSCULAR; INTRAVENOUS at 23:40

## 2022-05-09 RX ADMIN — FENTANYL CITRATE 25 MCG: 50 INJECTION, SOLUTION INTRAMUSCULAR; INTRAVENOUS at 03:04

## 2022-05-09 RX ADMIN — POTASSIUM CHLORIDE 20 MEQ: 29.8 INJECTION INTRAVENOUS at 11:50

## 2022-05-09 RX ADMIN — METOCLOPRAMIDE 10 MG: 5 INJECTION, SOLUTION INTRAMUSCULAR; INTRAVENOUS at 05:48

## 2022-05-09 RX ADMIN — PROPOFOL 10 MCG/KG/MIN: 10 INJECTION, EMULSION INTRAVENOUS at 19:30

## 2022-05-09 RX ADMIN — RIFAXIMIN 550 MG: 550 TABLET ORAL at 23:41

## 2022-05-09 RX ADMIN — DEXMEDETOMIDINE HYDROCHLORIDE 1.1 MCG/KG/HR: 4 INJECTION, SOLUTION INTRAVENOUS at 09:05

## 2022-05-09 RX ADMIN — DEXMEDETOMIDINE HYDROCHLORIDE 0.9 MCG/KG/HR: 4 INJECTION, SOLUTION INTRAVENOUS at 11:54

## 2022-05-09 RX ADMIN — ALBUTEROL SULFATE 4 PUFF: 90 AEROSOL, METERED RESPIRATORY (INHALATION) at 18:59

## 2022-05-09 RX ADMIN — DEXMEDETOMIDINE HYDROCHLORIDE 0.7 MCG/KG/HR: 4 INJECTION, SOLUTION INTRAVENOUS at 16:15

## 2022-05-09 RX ADMIN — MAGNESIUM SULFATE IN DEXTROSE 1000 MG: 10 INJECTION, SOLUTION INTRAVENOUS at 09:28

## 2022-05-09 RX ADMIN — ALBUTEROL SULFATE 4 PUFF: 90 AEROSOL, METERED RESPIRATORY (INHALATION) at 23:30

## 2022-05-09 RX ADMIN — SODIUM CHLORIDE 1 G: 1 TABLET ORAL at 15:56

## 2022-05-09 RX ADMIN — CHLORHEXIDINE GLUCONATE 0.12% ORAL RINSE 15 ML: 1.2 LIQUID ORAL at 08:48

## 2022-05-09 ASSESSMENT — PAIN SCALES - GENERAL
PAINLEVEL_OUTOF10: 6
PAINLEVEL_OUTOF10: 5
PAINLEVEL_OUTOF10: 0
PAINLEVEL_OUTOF10: 5

## 2022-05-09 ASSESSMENT — PULMONARY FUNCTION TESTS
PIF_VALUE: 25
PIF_VALUE: 23
PIF_VALUE: 18
PIF_VALUE: 22
PIF_VALUE: 19

## 2022-05-09 NOTE — CONSULTS
Neurology Service Consult Note  [facility]  Patient Name: Kanika Galicia  : 1945        Subjective:   Reason for consult: AMS  Patient seen and examined. Chart reviewed in detail. 78 y.o. -male with PMH of anxiety, alcoholic cirrhosis, GERD, sleep apnea, HLD, and HTN presenting to [facility] due to difficulty breathing. Patient was found to have pleural effusion. Neurology consulted for altered mental status. Patient is seen in the ICU sedated on ventilator. Patient recently was placed on propofol, as he would violently shake the bed, and attempt to get up while on Precedex alone. In brief, during his hospital stay he has received 2 thoracentesis  And a paracentesis that removed 6 L of fluid from abdomen. Patient is also on isolation for rhinovirus, and was intubated for airway protection. Patient received CT head 2022 that was nonacute, however patient was moving quite a bit. Further review of chart shows BMP with a sodium 128, potassium 3.1, ammonia 82, magnesium 1.6    On exam, patient was recently started on propofol, which limits my exam.  He was minimally responsive during that time, however bedside RN tells me that he was pulling at wires and ET tube on the Precedex alone. We will continue with serial  neurological exams to monitor for changes. Past Medical History:   Diagnosis Date    Anxiety     Cirrhosis, alcoholic ([address]     GERD (gastroesophageal reflux disease)     GERD (gastroesophageal reflux disease)     Hyperlipidemia     Hypertension     Portal hypertension (HCC)     Pulmonary nodules     Sleep apnea     SOB (shortness of breath)     :   Past Surgical History:   Procedure Laterality Date    CHOLECYSTECTOMY      COLONOSCOPY      ENDOSCOPY, COLON, DIAGNOSTIC      FOOT SURGERY      needle removed,not sure which foot, per wife.     UPPER GASTROINTESTINAL ENDOSCOPY N/A 2022    EGD BIOPSY performed by Becca Clark MD at [place] [address] [facility]       Medications:  Scheduled Meds:   metoclopramide  10 mg IntraVENous 3 times per day    insulin regular  0-12 Units SubCUTAneous Q6H    vancomycin  1,250 mg IntraVENous Q24H    enoxaparin  40 mg SubCUTAneous Daily    cefepime  2,000 mg IntraVENous Q8H    albuterol sulfate HFA  4 puff Inhalation Q4H    chlorhexidine  15 mL Mouth/Throat BID    famotidine (PEPCID) injection  20 mg IntraVENous BID    lidocaine PF  5 mL IntraDERmal Once    sodium chloride flush  5-40 mL IntraVENous 2 times per day    sodium chloride flush  5-40 mL IntraVENous 2 times per day    DULoxetine  60 mg Oral Daily    lactulose  20 g Oral TID    levothyroxine  50 mcg Oral Daily    magnesium oxide  200 mg Oral BID    [Held by provider] nadolol  40 mg Oral Daily    rifAXIMin  550 mg Oral BID    spironolactone  100 mg Oral Daily    sodium chloride flush  5-40 mL IntraVENous 2 times per day     Continuous Infusions:   propofol 10 mcg/kg/min (05/09/22 0920)    dexmedetomidine HCl in NaCl 0.9 mcg/kg/hr (05/09/22 1154)    furosemide (LASIX) 1mg/ml infusion 5 mg/hr (05/09/22 0700)    sodium chloride Stopped (05/06/22 1431)    norepinephrine 1 mcg/min (05/09/22 0700)    sodium chloride 10 mL/hr at 05/09/22 0700    sodium chloride       PRN Meds:.potassium chloride **OR** potassium chloride, fentaNYL, magnesium sulfate, sodium chloride flush, sodium chloride, sodium phosphate IVPB **OR** sodium phosphate IVPB, sodium chloride flush, sodium chloride, albuterol sulfate HFA, sodium chloride flush, sodium chloride, nicotine polacrilex, acetaminophen **OR** acetaminophen, ondansetron, ALPRAZolam    Allergies   Allergen Reactions    Lisinopril Swelling     Tongue swelling      Zetia [Ezetimibe] Swelling     Facial swelling    Atorvastatin     Codeine Other (See Comments)     Blood pressure drops    Hydrochlorothiazide     Hydroxyzine      Fatigue and depressed    Lexapro [Escitalopram Oxalate]      Increased depression    Pravastatin      Social History     Socioeconomic History    Marital status:      Spouse name: Not on file    Number of children: Not on file    Years of education: Not on file    Highest education level: Not on file   Occupational History     Comment: Retired    Tobacco Use    Smoking status: Former Smoker     Packs/day: 1.00     Years: 14.00     Pack years: 14.00     Quit date:      Years since quittin.3    Smokeless tobacco: Never Used   Vaping Use    Vaping Use: Never used   Substance and Sexual Activity    Alcohol use: Not Currently    Drug use: Never    Sexual activity: Yes     Partners: Female   Other Topics Concern    Not on file   Social History Narrative    Not on file     Social Determinants of Health     Financial Resource Strain: Low Risk     Difficulty of Paying Living Expenses: Not hard at all   Food Insecurity: No Food Insecurity    Worried About [address] in the Last Year: Never true    [facility] in the Last Year: Never true   Transportation Needs:     Lack of Transportation (Medical): Not on file    Lack of Transportation (Non-Medical):  Not on file   Physical Activity:     Days of Exercise per Week: Not on file    Minutes of Exercise per Session: Not on file   Stress:     Feeling of Stress : Not on file   Social Connections:     Frequency of Communication with Friends and Family: Not on file    Frequency of Social Gatherings with Friends and Family: Not on file    Attends Faith Services: Not on file    Active Member of Clubs or Organizations: Not on file    Attends Club or Organization Meetings: Not on file    Marital Status: Not on file   Intimate Partner Violence:     Fear of Current or Ex-Partner: Not on file    Emotionally Abused: Not on file    Physically Abused: Not on file    Sexually Abused: Not on file   Housing Stability:     Unable to Pay for Housing in the Last Year: Not on file    Number of Places Lived in the Last Year: Not on file    Unstable Housing in the Last Year: Not on file      Family History   Problem Relation Age of Onset    Hypertension Brother     Diabetes Other          ROS (10 systems)  Patient intubated on vent. Unable to ask ROS questions at this time. Physical Exam:       Vitals:    05/09/22 0753 05/09/22 0754 05/09/22 0800 05/09/22 1134   BP:   114/62    Pulse:  59 55 54   Resp: 18 19 16 16   Temp:       TempSrc:       SpO2: 94% 94% 93% 94%   Weight:       Height:           Wt Readings from Last 3 Encounters:   05/04/22 285 lb (129.3 kg)   04/25/22 282 lb (127.9 kg)   03/21/22 298 lb 11.6 oz (135.5 kg)     Temp Readings from Last 3 Encounters:   05/09/22 98.5 °F (36.9 °C) (Axillary)   04/25/22 97.2 °F (36.2 °C) (Infrared)   03/21/22 98.3 °F (36.8 °C) (Oral)     BP Readings from Last 3 Encounters:   05/09/22 114/62   03/21/22 (!) 128/55   03/11/22 122/64     Pulse Readings from Last 3 Encounters:   05/09/22 54   04/25/22 67   03/21/22 75        Gen: Sedated and Intubated on vent  HEENT: NC/AT, oculocephalics intact,  PERRL, mmm, neck supple, no meningeal signs; Heart: SB on monitor  Lungs:  On vent  Ext: no edema,   Psych: ROXANN  Skin: no rashes or lesions    NEUROLOGIC EXAM:    Mental Status:  NAD, Sedated on vent  Cranial Nerve Exam:   CN II-XII: PERRL, , no nystagmus, no gaze paresis,  Face appears symmetrical, tongue midline against ET tube; present cough reflex, present gag reflex, present corneal reflex    Motor Exam:       Strength  movement to painful stimuli x 4  Tone and bulk normal   ROXANN pronator drift    Deep Tendon Reflexes: 2/4 biceps, triceps, brachioradialis, patellar, and achilles b/l; flexor plantar responses b/l    Sensation:movement to painful stimuli x 4    Coordination/Cerebellum:       Tremors--none      Rapidly alternating movements: ROXANN            Heel-to-Shin: ROXANN      Finger-to-Nose: ROXANN  Gait and stance:      Gait: deferred      LABS:        CBC: No results for input(s): WBC, RBC, HGB, HCT, PLT, MCV in the last 72 hours. BMP:    Recent Labs     05/09/22  0557   *   K 3.1*      CO2 22   BUN 22   CREATININE 1.2   GLUCOSE 359*   CALCIUM 7.8*       IMAGING:    CTH         Impression   1. Motion compromised study but no acute intracranial abnormality.  I would   suggest follow-up if symptoms persist.       Above imaging reviewed in detail. ASSESSMENT/PLAN:     78 y.o. -male with PMH of anxiety, alcoholic cirrhosis, GERD, sleep apnea, HLD, and HTN presenting to [facility] due to difficulty breathing. Patient was found to have pleural effusion. Neurology consulted for altered mental status. 1. Altered mental status likely due to toxic metabolic encephalopathy in the setting of leukocytosis, hyponatremia, hypokalemia, and hypomagnesia. Patient also has abnormality  and is on meropenem for leukocytosis of unknown origin. We will repeat CT head. Patient's mentation will likely  slowly improve upon metabolic improvements.   --CTH as above, repeat CT head ordered  --Full neurological examination limitied due to patient being on sedation, Recommendations to decrease sedation as this will provide opportunity to further examine patient's current neurological status. Patient discussed with attending physician Dr. Keyon Pineda     Thank you for allowing us to participate in the care of your patient. If there are any questions regarding evaluation please feel free to contact us. MOHINDER Rosales CNP, 5/9/2022    ------------------------------------    Attending Note:  I have rounded on this patient with Juanita Navarrete CNP. I have reviewed the chart and we have discussed this case in detail. The patient was seen and examined by myself. Pertinent labs and imaging have been personally reviewed. Our findings and impressions were discussed with the patient.  I concur with the Nurse Practitioner's assessment and plan. Suspected toxic metabolic encephalopathy in setting of liver failure and infection. Examination was limited for me today as the patient was heavily sedated. Would recommend continuing to wean sedation as able as patient may require prolonged period to clear sedating medications. Per nurse, patient is moving all four extremities without focal deficit. Will obtain repeat CT head, but do not feel MRI brain is necessarily indicated at this time. We will continue tofollow.      Sesar Martins DO 5/9/2022 4:23 PM

## 2022-05-09 NOTE — PROGRESS NOTES
Comprehensive Nutrition Assessment    Type and Reason for Visit:  Reassess    Nutrition Recommendations/Plan:   1. Restart EN as soon as possible  2. Advance TF to goal rate as tolerated  3. Will closely monitor po intake, weight trends, poc     Malnutrition Assessment:  Malnutrition Status:  Insufficient data (05/05/22 8920)    Context:  Acute Illness     Nutrition Assessment:    Pt remains sedated on vent, +levophed ggt, no EN running at time of visit, will continue to follow at high nutrition risk    Nutrition Related Findings:    Meds: Reglan Wound Type: None       Current Nutrition Intake & Therapies:    Average Meal Intake: NPO  Average Supplements Intake: NPO  Additional Calorie Sources:  · Pt is receiving ~206 kcal from current propofol rate    Anthropometric Measures:  Height: 5' 7.99\" (172.7 cm)  Ideal Body Weight (IBW): 154 lbs (70 kg)    Admission Body Weight:  (n/a)  Current Body Weight: 285 lb 0.9 oz (129.3 kg), 185.1 % IBW.  Weight Source: Bed Scale  Current BMI (kg/m2): 43.4     Weight Adjustment For: No Adjustment  BMI Categories: Obese Class 3 (BMI 40.0 or greater)  Estimated Daily Nutrient Needs:  Weight Used for Energy Requirements: Current  Energy (kcal/day): 1891 ([facility] 2010)  Weight Used for Protein Requirements: Ideal  Protein (g/day): 140 (2.0 g/kg)  Nutrition Diagnosis:   · Inadequate oral intake related to acute injury/trauma as evidenced by NPO or clear liquid status due to medical condition  Nutrition Interventions:   Food and/or Nutrient Delivery:  (restart EN as soon as possible)  Nutrition Education/Counseling: No recommendation at this time  Coordination of Nutrition Care: Continue to monitor while inpatient     Goals:  Previous Goal Met: No Progress toward Goal(s)  Goals: within 2 days,Tolerate nutrition support at goal rate     Nutrition Monitoring and Evaluation:   Behavioral-Environmental Outcomes: None Identified  Food/Nutrient Intake Outcomes: Enteral Nutrition

## 2022-05-09 NOTE — PROGRESS NOTES
V2.0  [facility] [place]      Name:  Ashlyn Bello /Age/Sex: 1945  (78 y.o. male)   MRN & CSN:  [id number] & [id number] Encounter Date/Time: 2022 11:52 AM EDT    Location:  -A PCP: Chris Pollock, [address] Day: 8    Assessment and Plan:   Ashlyn Bello is a 78 y.o. male with pmh of HTN, alcoholic cirrhosis with recurrent ascites  who presents with Pleural effusion    1. Acute respiratory failure   2. Pleural effusions  3. Rhinovirus Enterovirus  4. Alcoholic cirrhosis with ascites   5. Portal hypertension  6. Acute encephalopathy  7. Chronic kidney disease  8. Hypotensionn  9. Hypokalemia  10. Hypomagnesia   11. Hyperglycemia     Plan:   -Continue full mechanical ventilation at this time  -Continue bronchodilators, pulmonary hygiene  -Continue Lactulose and Rifaximin; GI Following  -May need TIPS procedure   -Plan to restart tube feedings, will reassess for tolerance  -Continue Lasix infusion; monitor I&O  -Monitor renal function; avoid nephrotoxic agents   -Continue Levophed for hypotension; wean as able to keep MAP>65.  -Continue Cefepime and Vanco, ID following  -Insulin sliding scale with glucose POC  -Monitor electrolytes and replete as needed     GI prophylaxis: Pepcid     Plan of care discussed with Dr.Ahmed Bernstein ADULT TUBE FEEDING; Orogastric; Peptide Based High Protein; Continuous; 10; Yes; 10; Q 6 hours; 45; 30; Q 4 hours; Protein; Proteinex BID   DVT Prophylaxis [x] Lovenox, []  Heparin, [] SCDs, [] Ambulation,  [] Eliquis, [] Xarelto  [] Coumadin   Code Status Full Code   Disposition Patient requires continued admission due to acute respiratory failure          Subjective:   No acute events overnight. Tube feedings on hold with minimal OG output. He remains on continuous mechanical ventilation with sedation. He does not arouse when I am seeing him, he does not follow commands. Plan of care discussed with bedside RN.        Review of Systems:    Review of Systems: Unable to complete at this time due to mental status. Objective: Intake/Output Summary (Last 24 hours) at 5/9/2022 1152  Last data filed at 5/9/2022 1020  Gross per 24 hour   Intake 1683.02 ml   Output 2880 ml   Net -1196.98 ml        Vitals:   Vitals:    05/09/22 1134   BP:    Pulse: 54   Resp: 16   Temp:    SpO2: 94%       Physical Exam:     General: Ill appearing male, on vent. In no acute distress. Eyes: EOMI  ENT: neck supple. ETT and OG in place   Cardiovascular: Regular rate and rhythm. Bradycardic   Respiratory: Coarse breath sounds throughout   Gastrointestinal: Soft, non tender  Genitourinary:  Schilling in place   Musculoskeletal: No edema  Skin: warm, dry  Neuro: Sedated, does not follow commands   Psych: Calm    Medications:   Medications:    metoclopramide  10 mg IntraVENous 3 times per day    insulin regular  0-12 Units SubCUTAneous Q6H    vancomycin  1,250 mg IntraVENous Q24H    enoxaparin  40 mg SubCUTAneous Daily    cefepime  2,000 mg IntraVENous Q8H    albuterol sulfate HFA  4 puff Inhalation Q4H    chlorhexidine  15 mL Mouth/Throat BID    famotidine (PEPCID) injection  20 mg IntraVENous BID    lidocaine PF  5 mL IntraDERmal Once    sodium chloride flush  5-40 mL IntraVENous 2 times per day    sodium chloride flush  5-40 mL IntraVENous 2 times per day    DULoxetine  60 mg Oral Daily    lactulose  20 g Oral TID    levothyroxine  50 mcg Oral Daily    magnesium oxide  200 mg Oral BID    [Held by provider] nadolol  40 mg Oral Daily    rifAXIMin  550 mg Oral BID    spironolactone  100 mg Oral Daily    sodium chloride flush  5-40 mL IntraVENous 2 times per day      Infusions:    propofol 10 mcg/kg/min (05/09/22 0920)    dexmedetomidine HCl in NaCl 0.9 mcg/kg/hr (05/09/22 1118)    furosemide (LASIX) 1mg/ml infusion 5 mg/hr (05/09/22 0700)    sodium chloride Stopped (05/06/22 1431)    norepinephrine 1 mcg/min (05/09/22 0700)    sodium chloride 10 mL/hr at 05/09/22 0700    sodium chloride       PRN Meds: potassium chloride, 20 mEq, PRN   Or  potassium chloride, 10 mEq, PRN  fentaNYL, 25 mcg, Q2H PRN  magnesium sulfate, 1,000 mg, PRN  sodium chloride flush, 5-40 mL, PRN  sodium chloride, , PRN  sodium phosphate IVPB, 15 mmol, PRN   Or  sodium phosphate IVPB, 15 mmol, PRN  sodium chloride flush, 5-40 mL, PRN  sodium chloride, , PRN  albuterol sulfate HFA, 2 puff, Q4H PRN  sodium chloride flush, 10 mL, PRN  sodium chloride, , PRN  nicotine polacrilex, 2 mg, Q1H PRN  acetaminophen, 650 mg, Q6H PRN   Or  acetaminophen, 650 mg, Q6H PRN  ondansetron, 4 mg, Q6H PRN  ALPRAZolam, 0.5 mg, Daily PRN        Labs      Recent Results (from the past 24 hour(s))   POCT Glucose    Collection Time: 05/08/22 12:20 PM   Result Value Ref Range    POC Glucose 117 (H) 70 - 99 MG/DL   POCT Glucose    Collection Time: 05/08/22  5:41 PM   Result Value Ref Range    POC Glucose 120 (H) 70 - 99 MG/DL   POCT Glucose    Collection Time: 05/09/22 12:48 AM   Result Value Ref Range    POC Glucose 116 (H) 70 - 99 MG/DL   Basic Metabolic Panel w/ Reflex to MG    Collection Time: 05/09/22  5:57 AM   Result Value Ref Range    Sodium 128 (L) 135 - 145 MMOL/L    Potassium 3.1 (L) 3.5 - 5.1 MMOL/L    Chloride 104 99 - 110 mMol/L    CO2 22 21 - 32 MMOL/L    Anion Gap 2 (L) 4 - 16    BUN 22 6 - 23 MG/DL    CREATININE 1.2 0.9 - 1.3 MG/DL    Glucose 359 (H) 70 - 99 MG/DL    Calcium 7.8 (L) 8.3 - 10.6 MG/DL    GFR Non- 59 (L) >60 mL/min/1.73m2    GFR African American >60 >60 mL/min/1.73m2   Ammonia    Collection Time: 05/09/22  5:57 AM   Result Value Ref Range    Ammonia 82 (H) 16 - 60 UMOL/L   Protime-INR    Collection Time: 05/09/22  5:57 AM   Result Value Ref Range    Protime 15.8 (H) 11.7 - 14.5 SECONDS    INR 1.22 INDEX   Vancomycin Level, Random    Collection Time: 05/09/22  5:57 AM   Result Value Ref Range    Vancomycin Rm 17.2 UG/ML    DOSE AMOUNT DOSE AMT.  GIVEN - 1250mg     DOSE TIME DOSE TIME GIVEN - 1100    C-Reactive Protein    Collection Time: 05/09/22  5:57 AM   Result Value Ref Range    CRP, High Sensitivity 185.4 mg/L   Magnesium    Collection Time: 05/09/22  5:57 AM   Result Value Ref Range    Magnesium 1.6 (L) 1.8 - 2.4 mg/dl   Phosphorus    Collection Time: 05/09/22  5:57 AM   Result Value Ref Range    Phosphorus 2.3 (L) 2.5 - 4.9 MG/DL        Imaging/Diagnostics Last 24 Hours   XR ABDOMEN (KUB) (SINGLE AP VIEW)    Result Date: 5/8/2022  EXAMINATION: ONE SUPINE XRAY VIEW(S) OF THE ABDOMEN 5/8/2022 12:25 pm COMPARISON: None. HISTORY: ORDERING SYSTEM PROVIDED HISTORY: Constipation, Pt constantly moving held by two staff members, best films possible FINDINGS: 4 images are presented. Unremarkable fecal burden. Unremarkable bowel gas pattern. No unusual abdominal or pelvic soft tissue or calcific density is seen. Visualized osseous structures appear unremarkable. NG tube extends below the left hemidiaphragm, into the upper abdomen, tip overlying the expected level of the stomach, epigastrium. Schilling catheter overlies the pelvis. No radiopaque urinary collecting system calculus evident. Unremarkable bowel gas pattern. Unremarkable fecal burden. XR CHEST PORTABLE    Result Date: 5/9/2022  EXAMINATION: ONE XRAY VIEW OF THE CHEST 5/9/2022 5:01 am COMPARISON: 05/08/2022 HISTORY: ORDERING SYSTEM PROVIDED HISTORY: ventilator TECHNOLOGIST PROVIDED HISTORY: Reason for exam:->ventilator Reason for Exam: vent Follow-up exam FINDINGS: Endotracheal tube tip is 3.3 cm above the berny. The enteric tube courses below the diaphragm. Right PICC line is stable in positioning. No significant interval change in right-sided airspace opacity and/or pleural effusion. Small left pleural effusion is unchanged. No pneumothorax. Stable cardiac silhouette. The osseous structures are stable. No interval change of right-sided airspace opacity and probable bilateral pleural effusions.   Endotracheal tube tip is 3.3 cm above the berny. XR CHEST PORTABLE    Result Date: 5/8/2022  EXAMINATION: ONE XRAY VIEW OF THE CHEST 5/8/2022 4:45 am COMPARISON: Chest radiograph dated May 7, 2022 HISTORY: ORDERING SYSTEM PROVIDED HISTORY: ventilator TECHNOLOGIST PROVIDED HISTORY: Reason for exam:->ventilator Reason for Exam: ventilator Additional signs and symptoms: none FINDINGS: Tip of the ET tube is 5.2 cm above the berny. The enteric tube extends below the diaphragm. The cardiomediastinal silhouette is stable. A right-sided PICC line is in place. Low lung volumes are noted. Right lung opacities are seen without significant change. Diffuse opacities are seen within the right lung without significant change. Tip of the ET tube is 5.9 cm above the berny.        Electronically signed by MOHINDER Brush CNP on 5/9/2022 at 11:52 AM

## 2022-05-09 NOTE — PROGRESS NOTES
Pulmonary and Critical Care  Progress Note      VITALS:  /62   Pulse 55   Temp 98.5 °F (36.9 °C) (Axillary)   Resp 16   Ht 5' 7.99\" (1.727 m)   Wt 285 lb (129.3 kg)   SpO2 93%   BMI 43.34 kg/m²     Subjective:   CHIEF COMPLAINT :SOB     HPI:                The patient is on the vent and sedated. He is restless and confused. He is more awake today but doesn't follow commands. He is on 40% fio2    Objective:   PHYSICAL EXAM:    LUNGS:Decreased air entry right base  Abd-distended, BS+,NT  Ext- no pedal edema  CVS-s1s2, no murmurs      DATA:    CBC:  No results for input(s): WBC, RBC, HGB, HCT, PLT, MCV, MCH, MCHC, RDW, NRBC, SEGSPCT, BANDSPCT in the last 72 hours. BMP:  Recent Labs     05/07/22  2203 05/08/22  0650 05/09/22  0557   * 134* 128*   K 3.4* 4.1 3.1*   CL 96* 97* 104   CO2 28 27 22   BUN 23 23 22   CREATININE 1.4* 1.4* 1.2   CALCIUM 8.7 8.8 7.8*   GLUCOSE 125* 126* 359*      ABG:  Recent Labs     05/07/22  0600 05/08/22  0600   PH 7.48* 7.52*   PO2ART 70* 70*   GHE2OUB 43.0 36.0   O2SAT 93.0* 92.8*     BNP  No results found for: BNP   D-Dimer:  Lab Results   Component Value Date    DDIMER 1606 (H) 09/13/2021      1.  Radiology: Pending      Assessment/Plan     Patient Active Problem List    Diagnosis Date Noted    Acute respiratory failure ([facility] [address] 05/04/2022     Priority: Medium    Acute on chronic respiratory failure with hypoxemia (HCC)      Priority: Medium    Pleural effusion 05/02/2022     Priority: Medium    Sepsis ([address] 03/19/2022    Hepatic encephalopathy ([address] 03/19/2022    Hearing loss 03/11/2022    Cirrhosis of liver with ascites ([address]     Secondary esophageal varices without bleeding (HCC)     Gastric polyps     SOB (shortness of breath) 09/13/2021    Portal hypertension ([address] 08/30/2021    JENNIFER (obstructive sleep apnea) 08/09/2021    Bilateral hearing loss 05/07/2021    Increased ammonia level 05/07/2021    Class 3 severe obesity with body mass index (BMI) of 40.0 to 44.9 in adult [facility]) 09/21/2020    Hyperglycemia 12/04/2019    Acquired hypothyroidism 12/04/2019    Pulmonary nodules 76/68/3510    Alcoholic cirrhosis of liver with ascites (HCC)-Dr Jacques 08/08/2019    Mixed hyperlipidemia 08/08/2019    Hypertension 08/08/2019    History of alcohol abuse 08/08/2019    Gastroesophageal reflux disease without esophagitis 08/08/2019    Anxiety 08/08/2019    Shortness of breath 05/26/2019    History of colonic polyps 01/15/2019   Acute Hypoxic resp failure  Recurrent Right pleural effusion 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 JENNIFER  VDRF  Toxic Metabolic encephalopathy  Moderate Malnutrition       1. Nutritional optimization  2. Abx  3. F/u C&S  4. Lactulose  5. Propofol for RASS -1 to 1  6. Tube feeds  7. Keep sats > 92%  8. Await Neurology input  9. Daily SAT and SBT as tolerated  10. Prognosis guarded  11. F/u CXR  12.  C/w present management                                                                                          D/w nursing and patient family and more than 30 mins of CC time spent on the patient    Electronically signed by Niru Madera MD on 5/9/2022 at 9:40 AM

## 2022-05-09 NOTE — PROGRESS NOTES
remains intubated on the vent  Now responding to stimulation but not really following commands  Was having no stool response to the lactulose enemas-- abd xray done and unremarkable  High NG output and started on Reglan over the weekend  Abdomen soft, non-tender, non-distended  Impression:               1) cirrhosis with ascites and likely PSE              2) large pleural effusion- need to decide if secondary to CHF, lung disease or hepatic hydrothorax- if felt to be hepatic hydrothorax, need to determine if candidate for TIPS                    Plan:              1) continue cardio-pulmonary stabilization   2) if NG output less than 200 cc/shift would start TF (unless in weaning process from ventilator)   3) after extubation, if pleural effusion persists need opinions from pulmonary and cardiology about whether they think there is an etiology other than hepatic hydrothorax- if not need to consider TIPS realizing that it likely will aggravate any underlying hepatic encephalopathy    NOTE: I will be away from TOMORROW at 6 am until [address] at 6:00 am. If GI follow up needed before then, please call the GI doctor on call (they will not see unless called)---Dr. Taniya Juarez from 5/10/22 until Monday 5/16/22- then Dr. Tyrel Chung on Monday 5/16 until I am back Tuesday 5/17 at 6:00 am).  I will be available, however, for phone consultation- [phone] except during times of plane flights

## 2022-05-09 NOTE — PROGRESS NOTES
[address] [facility]  consulted by Dr. Ulysses Husky for monitoring and adjustment. Indication for treatment: CAP  Goal trough: [] 10-15 mcg/mL or [x] 15-20 mcg/ml  AUC/TRES: [] <500 or [x] 400-600    Pertinent Laboratory Values:   Temp Readings from Last 3 Encounters:   05/09/22 98.5 °F (36.9 °C) (Axillary)   04/25/22 97.2 °F (36.2 °C) (Infrared)   03/21/22 98.3 °F (36.8 °C) (Oral)     No results for input(s): WBC, LACTATE in the last 72 hours. Recent Labs     05/07/22  2203 05/08/22  0650 05/09/22  0557   BUN 23 23 22   CREATININE 1.4* 1.4* 1.2     Estimated Creatinine Clearance: 71 mL/min (based on SCr of 1.2 mg/dL). Intake/Output Summary (Last 24 hours) at 5/9/2022 1333  Last data filed at 5/9/2022 1020  Gross per 24 hour   Intake 1683.02 ml   Output 2610 ml   Net -926.98 ml       Pertinent Cultures:  Date    Source    Results  5/2   Resp PCR   Rhinovirus  5/2   Anaerobic (chest)  Pending  5/2   Pleural Fluid   NGTD   5/5   Ascitic Fluid   Pending  5/5   MRSA nasal   MSSA present  5/6   Thoracentesis Fluid  Pending  5/6   Sputum   Pending      Vancomycin level:   TROUGH:  No results for input(s): VANCOTROUGH in the last 72 hours.   RANDOM:    Recent Labs     05/07/22  0325 05/09/22  0557   VANCORANDOM 20.6 17.2       Assessment:  · SCr, BUN, and urine output:  · Scr slightly improved, Scr 1.2  · Day(s) of therapy: 5 of 7  · Vancomycin concentration:  · 5/7 - 20.6, 16.5h level, 1750mg q24h, , above goal  · 5/9: 17.2, 18.5h level, 1250mg q24h, , therapeutic    Plan:  · Continue vancomycin 1250 mg IVPB q24h with a therapeutic level  · No additional levels will be collected (7 days of therapy total to be completed)  · Pharmacy will continue to monitor patient and adjust therapy as indicated    Thank you for the consult,  Tevin Jay [facility], PharmD  5/9/2022 1:33 PM

## 2022-05-10 ENCOUNTER — APPOINTMENT (OUTPATIENT)
Dept: CT IMAGING | Age: 77
DRG: 432 | End: 2022-05-10
Payer: MEDICARE

## 2022-05-10 ENCOUNTER — APPOINTMENT (OUTPATIENT)
Dept: ULTRASOUND IMAGING | Age: 77
DRG: 432 | End: 2022-05-10
Payer: MEDICARE

## 2022-05-10 ENCOUNTER — APPOINTMENT (OUTPATIENT)
Dept: GENERAL RADIOLOGY | Age: 77
DRG: 432 | End: 2022-05-10
Payer: MEDICARE

## 2022-05-10 LAB
AMMONIA: 40 UMOL/L (ref 16–60)
ANION GAP SERPL CALCULATED.3IONS-SCNC: 10 MMOL/L (ref 4–16)
ANION GAP SERPL CALCULATED.3IONS-SCNC: 11 MMOL/L (ref 4–16)
BASE EXCESS MIXED: 7.4 (ref 0–1.2)
BUN BLDV-MCNC: 25 MG/DL (ref 6–23)
CALCIUM SERPL-MCNC: 8.9 MG/DL (ref 8.3–10.6)
CARBON MONOXIDE, BLOOD: 2.3 % (ref 0–5)
CHLORIDE BLD-SCNC: 100 MMOL/L (ref 99–110)
CHLORIDE BLD-SCNC: 102 MMOL/L (ref 99–110)
CO2 CONTENT: 29.4 MMOL/L (ref 19–24)
CO2: 26 MMOL/L (ref 21–32)
CO2: 26 MMOL/L (ref 21–32)
COMMENT: ABNORMAL
CREAT SERPL-MCNC: 1.3 MG/DL (ref 0.9–1.3)
GFR AFRICAN AMERICAN: >60 ML/MIN/1.73M2
GFR NON-AFRICAN AMERICAN: 54 ML/MIN/1.73M2
GLUCOSE BLD-MCNC: 101 MG/DL (ref 70–99)
GLUCOSE BLD-MCNC: 105 MG/DL (ref 70–99)
GLUCOSE BLD-MCNC: 115 MG/DL (ref 70–99)
GLUCOSE BLD-MCNC: 125 MG/DL (ref 70–99)
HCO3 ARTERIAL: 28.5 MMOL/L (ref 18–23)
HIGH SENSITIVE C-REACTIVE PROTEIN: 205.5 MG/L
INR BLD: 1.08 INDEX
MAGNESIUM: 2.1 MG/DL (ref 1.8–2.4)
METHEMOGLOBIN ARTERIAL: 1.3 %
O2 SATURATION: 96.4 % (ref 96–97)
PCO2 ARTERIAL: 29 MMHG (ref 32–45)
PH BLOOD: 7.6 (ref 7.34–7.45)
PHOSPHORUS: 2.3 MG/DL (ref 2.5–4.9)
PO2 ARTERIAL: 115 MMHG (ref 75–100)
POTASSIUM SERPL-SCNC: 3.4 MMOL/L (ref 3.5–5.1)
POTASSIUM SERPL-SCNC: 4.2 MMOL/L (ref 3.5–5.1)
POTASSIUM SERPL-SCNC: 4.4 MMOL/L (ref 3.5–5.1)
PROCALCITONIN: 0.31
PROTHROMBIN TIME: 13.9 SECONDS (ref 11.7–14.5)
SODIUM BLD-SCNC: 136 MMOL/L (ref 135–145)
SODIUM BLD-SCNC: 139 MMOL/L (ref 135–145)

## 2022-05-10 PROCEDURE — 83735 ASSAY OF MAGNESIUM: CPT

## 2022-05-10 PROCEDURE — 82962 GLUCOSE BLOOD TEST: CPT

## 2022-05-10 PROCEDURE — 2700000000 HC OXYGEN THERAPY PER DAY

## 2022-05-10 PROCEDURE — 94640 AIRWAY INHALATION TREATMENT: CPT

## 2022-05-10 PROCEDURE — 51702 INSERT TEMP BLADDER CATH: CPT

## 2022-05-10 PROCEDURE — 6370000000 HC RX 637 (ALT 250 FOR IP): Performed by: INTERNAL MEDICINE

## 2022-05-10 PROCEDURE — 84145 PROCALCITONIN (PCT): CPT

## 2022-05-10 PROCEDURE — 2500000003 HC RX 250 WO HCPCS: Performed by: NURSE PRACTITIONER

## 2022-05-10 PROCEDURE — 94003 VENT MGMT INPAT SUBQ DAY: CPT

## 2022-05-10 PROCEDURE — 84132 ASSAY OF SERUM POTASSIUM: CPT

## 2022-05-10 PROCEDURE — 80048 BASIC METABOLIC PNL TOTAL CA: CPT

## 2022-05-10 PROCEDURE — 6370000000 HC RX 637 (ALT 250 FOR IP): Performed by: NURSE PRACTITIONER

## 2022-05-10 PROCEDURE — 2500000003 HC RX 250 WO HCPCS: Performed by: INTERNAL MEDICINE

## 2022-05-10 PROCEDURE — A4216 STERILE WATER/SALINE, 10 ML: HCPCS | Performed by: INTERNAL MEDICINE

## 2022-05-10 PROCEDURE — 99233 SBSQ HOSP IP/OBS HIGH 50: CPT | Performed by: INTERNAL MEDICINE

## 2022-05-10 PROCEDURE — 70450 CT HEAD/BRAIN W/O DYE: CPT

## 2022-05-10 PROCEDURE — 82140 ASSAY OF AMMONIA: CPT

## 2022-05-10 PROCEDURE — 85610 PROTHROMBIN TIME: CPT

## 2022-05-10 PROCEDURE — 94761 N-INVAS EAR/PLS OXIMETRY MLT: CPT

## 2022-05-10 PROCEDURE — 71045 X-RAY EXAM CHEST 1 VIEW: CPT

## 2022-05-10 PROCEDURE — 76604 US EXAM CHEST: CPT

## 2022-05-10 PROCEDURE — 6360000002 HC RX W HCPCS: Performed by: INTERNAL MEDICINE

## 2022-05-10 PROCEDURE — 99232 SBSQ HOSP IP/OBS MODERATE 35: CPT

## 2022-05-10 PROCEDURE — 2580000003 HC RX 258: Performed by: INTERNAL MEDICINE

## 2022-05-10 PROCEDURE — 80051 ELECTROLYTE PANEL: CPT

## 2022-05-10 PROCEDURE — 2000000000 HC ICU R&B

## 2022-05-10 PROCEDURE — 82803 BLOOD GASES ANY COMBINATION: CPT

## 2022-05-10 PROCEDURE — 86141 C-REACTIVE PROTEIN HS: CPT

## 2022-05-10 PROCEDURE — 84100 ASSAY OF PHOSPHORUS: CPT

## 2022-05-10 PROCEDURE — 36600 WITHDRAWAL OF ARTERIAL BLOOD: CPT

## 2022-05-10 PROCEDURE — 99291 CRITICAL CARE FIRST HOUR: CPT | Performed by: INTERNAL MEDICINE

## 2022-05-10 PROCEDURE — 2580000003 HC RX 258: Performed by: NURSE PRACTITIONER

## 2022-05-10 PROCEDURE — 6360000002 HC RX W HCPCS: Performed by: HOSPITALIST

## 2022-05-10 RX ORDER — METOCLOPRAMIDE HYDROCHLORIDE 5 MG/ML
10 INJECTION INTRAMUSCULAR; INTRAVENOUS EVERY 6 HOURS
Status: COMPLETED | OUTPATIENT
Start: 2022-05-10 | End: 2022-05-10

## 2022-05-10 RX ORDER — POTASSIUM CHLORIDE 29.8 MG/ML
20 INJECTION INTRAVENOUS
Status: ACTIVE | OUTPATIENT
Start: 2022-05-10 | End: 2022-05-10

## 2022-05-10 RX ADMIN — SODIUM CHLORIDE 1 G: 1 TABLET ORAL at 09:15

## 2022-05-10 RX ADMIN — SODIUM CHLORIDE, PRESERVATIVE FREE 5 ML: 5 INJECTION INTRAVENOUS at 09:16

## 2022-05-10 RX ADMIN — ENOXAPARIN SODIUM 40 MG: 100 INJECTION SUBCUTANEOUS at 09:15

## 2022-05-10 RX ADMIN — METOCLOPRAMIDE 10 MG: 5 INJECTION, SOLUTION INTRAMUSCULAR; INTRAVENOUS at 06:14

## 2022-05-10 RX ADMIN — METOCLOPRAMIDE 10 MG: 5 INJECTION, SOLUTION INTRAMUSCULAR; INTRAVENOUS at 10:13

## 2022-05-10 RX ADMIN — RIFAXIMIN 550 MG: 550 TABLET ORAL at 20:33

## 2022-05-10 RX ADMIN — SODIUM CHLORIDE, PRESERVATIVE FREE 20 MG: 5 INJECTION INTRAVENOUS at 20:33

## 2022-05-10 RX ADMIN — LACTULOSE 20 G: 10 SOLUTION ORAL at 14:31

## 2022-05-10 RX ADMIN — METOCLOPRAMIDE 10 MG: 5 INJECTION, SOLUTION INTRAMUSCULAR; INTRAVENOUS at 16:43

## 2022-05-10 RX ADMIN — DEXMEDETOMIDINE HYDROCHLORIDE 0.8 MCG/KG/HR: 4 INJECTION, SOLUTION INTRAVENOUS at 19:20

## 2022-05-10 RX ADMIN — DEXMEDETOMIDINE HYDROCHLORIDE 0.8 MCG/KG/HR: 4 INJECTION, SOLUTION INTRAVENOUS at 23:14

## 2022-05-10 RX ADMIN — FUROSEMIDE 5 MG/HR: 100 INJECTION, SOLUTION INTRAMUSCULAR; INTRAVENOUS at 11:48

## 2022-05-10 RX ADMIN — SODIUM CHLORIDE, PRESERVATIVE FREE 10 ML: 5 INJECTION INTRAVENOUS at 06:14

## 2022-05-10 RX ADMIN — DULOXETINE 60 MG: 30 CAPSULE, DELAYED RELEASE ORAL at 20:33

## 2022-05-10 RX ADMIN — CHLORHEXIDINE GLUCONATE 0.12% ORAL RINSE 15 ML: 1.2 LIQUID ORAL at 09:15

## 2022-05-10 RX ADMIN — ALBUTEROL SULFATE 4 PUFF: 90 AEROSOL, METERED RESPIRATORY (INHALATION) at 03:25

## 2022-05-10 RX ADMIN — ALBUTEROL SULFATE 4 PUFF: 90 AEROSOL, METERED RESPIRATORY (INHALATION) at 08:07

## 2022-05-10 RX ADMIN — LEVOTHYROXINE SODIUM 50 MCG: 25 TABLET ORAL at 06:14

## 2022-05-10 RX ADMIN — SODIUM CHLORIDE, PRESERVATIVE FREE 20 MG: 5 INJECTION INTRAVENOUS at 09:15

## 2022-05-10 RX ADMIN — POTASSIUM CHLORIDE 20 MEQ: 29.8 INJECTION INTRAVENOUS at 09:32

## 2022-05-10 RX ADMIN — DEXTROSE MONOHYDRATE 20 MMOL: 5 INJECTION, SOLUTION INTRAVENOUS at 11:49

## 2022-05-10 RX ADMIN — ALBUTEROL SULFATE 4 PUFF: 90 AEROSOL, METERED RESPIRATORY (INHALATION) at 23:11

## 2022-05-10 RX ADMIN — SPIRONOLACTONE 100 MG: 50 TABLET ORAL at 09:15

## 2022-05-10 RX ADMIN — PROPOFOL 10 MCG/KG/MIN: 10 INJECTION, EMULSION INTRAVENOUS at 06:14

## 2022-05-10 RX ADMIN — DEXMEDETOMIDINE HYDROCHLORIDE 0.8 MCG/KG/HR: 4 INJECTION, SOLUTION INTRAVENOUS at 14:38

## 2022-05-10 RX ADMIN — Medication 5 MCG/MIN: at 18:18

## 2022-05-10 RX ADMIN — MAGNESIUM OXIDE 400 MG (241.3 MG MAGNESIUM) TABLET 200 MG: TABLET at 09:15

## 2022-05-10 RX ADMIN — CHLORHEXIDINE GLUCONATE 0.12% ORAL RINSE 15 ML: 1.2 LIQUID ORAL at 20:33

## 2022-05-10 RX ADMIN — SODIUM CHLORIDE, PRESERVATIVE FREE 10 ML: 5 INJECTION INTRAVENOUS at 20:34

## 2022-05-10 RX ADMIN — ALBUTEROL SULFATE 4 PUFF: 90 AEROSOL, METERED RESPIRATORY (INHALATION) at 12:10

## 2022-05-10 RX ADMIN — SODIUM CHLORIDE, PRESERVATIVE FREE 10 ML: 5 INJECTION INTRAVENOUS at 09:16

## 2022-05-10 RX ADMIN — RIFAXIMIN 550 MG: 550 TABLET ORAL at 09:15

## 2022-05-10 RX ADMIN — MAGNESIUM OXIDE 400 MG (241.3 MG MAGNESIUM) TABLET 200 MG: TABLET at 20:33

## 2022-05-10 RX ADMIN — SODIUM CHLORIDE 1 G: 1 TABLET ORAL at 20:33

## 2022-05-10 RX ADMIN — DEXMEDETOMIDINE HYDROCHLORIDE 0.7 MCG/KG/HR: 4 INJECTION, SOLUTION INTRAVENOUS at 03:58

## 2022-05-10 RX ADMIN — LACTULOSE 20 G: 10 SOLUTION ORAL at 06:14

## 2022-05-10 RX ADMIN — POTASSIUM CHLORIDE 20 MEQ: 29.8 INJECTION INTRAVENOUS at 10:17

## 2022-05-10 RX ADMIN — SODIUM CHLORIDE, PRESERVATIVE FREE 10 ML: 5 INJECTION INTRAVENOUS at 09:17

## 2022-05-10 RX ADMIN — DEXMEDETOMIDINE HYDROCHLORIDE 0.4 MCG/KG/HR: 4 INJECTION, SOLUTION INTRAVENOUS at 10:10

## 2022-05-10 RX ADMIN — ALBUTEROL SULFATE 4 PUFF: 90 AEROSOL, METERED RESPIRATORY (INHALATION) at 15:27

## 2022-05-10 RX ADMIN — DEXMEDETOMIDINE HYDROCHLORIDE 0.8 MCG/KG/HR: 4 INJECTION, SOLUTION INTRAVENOUS at 15:09

## 2022-05-10 RX ADMIN — SODIUM CHLORIDE 1 G: 1 TABLET ORAL at 14:31

## 2022-05-10 RX ADMIN — LACTULOSE 20 G: 10 SOLUTION ORAL at 20:33

## 2022-05-10 ASSESSMENT — PULMONARY FUNCTION TESTS
PIF_VALUE: 20
PIF_VALUE: 21
PIF_VALUE: 19
PIF_VALUE: 30

## 2022-05-10 ASSESSMENT — PAIN SCALES - GENERAL
PAINLEVEL_OUTOF10: 0

## 2022-05-10 NOTE — PROGRESS NOTES
Progress Note( Dr. Severo Massy)  5/9/2022  Subjective:   Admit Date: 5/2/2022  PCP: Saad Shelton MD    Admitted For : Shortness of breath with right-sided pleural effusion    Consulted For:  Better control of blood glucose      Interval History: Patient is sedated, intubated and ventilator  Continue to be holding off tube feeding as he still has high residual  Started on Reglan bowel movements get better        Intake/Output Summary (Last 24 hours) at 5/9/2022 2217  Last data filed at 5/9/2022 1908  Gross per 24 hour   Intake 2513.44 ml   Output 2025 ml   Net 488.44 ml       DATA    CBC:   No results for input(s): WBC, HGB, PLT in the last 72 hours. CMP:  Recent Labs     05/08/22  0650 05/09/22  0557 05/09/22  1850   * 128* 136   K 4.1 3.1* 3.6   CL 97* 104 99   CO2 27 22 26   BUN 23 22 23   CREATININE 1.4* 1.2 1.2   CALCIUM 8.8 7.8* 8.3     Lipids:   Lab Results   Component Value Date    CHOL 174 05/07/2022    CHOL 230 10/14/2019    HDL 28 05/07/2022    TRIG 130 05/07/2022     Glucose:  Recent Labs     05/09/22  0048 05/09/22  1234 05/09/22  1835   POCGLU 116* 152* 102*     BozbxsmatdV0F:  Lab Results   Component Value Date    LABA1C 6.1 08/09/2021     High Sensitivity TSH:   Lab Results   Component Value Date    TSHHS 0.900 05/05/2022     Free T3: No results found for: FT3  Free T4:  Lab Results   Component Value Date    T4FREE 0.78 03/29/2022       XR CHEST PORTABLE   Final Result   No interval change of right-sided airspace opacity and probable bilateral   pleural effusions. Endotracheal tube tip is 3.3 cm above the berny. XR ABDOMEN (KUB) (SINGLE AP VIEW)   Final Result   No radiopaque urinary collecting system calculus evident. Unremarkable bowel gas pattern. Unremarkable fecal burden. XR CHEST PORTABLE   Final Result   Diffuse opacities are seen within the right lung without significant change. Tip of the ET tube is 5.9 cm above the berny.          XR CHEST PORTABLE   Final Result   Bilateral pleural effusions and airspace opacities. Endotracheal tube tip is approximately 5 cm above the berny. XR CHEST PORTABLE   Final Result   Improved aeration of the right lung field compared to 05/04/2022 with   persistent large areas of increased opacification right apical and right   mid-basilar regions consistent with underlying pulmonary consolidation,   mass/neoplasm, residual pleural fluid or thickening or asymmetric elevation   of left hemidiaphragm. No pneumothorax or procedure related complication   suggested      Consolidating infiltrate and/or moderate-sized pleural effusion left basilar   region. Stable borderline cardiomegaly, satisfactory position of nasogastric and   endotracheal tubes, soft tissue density right paratracheal region consistent   with mass/neoplasm, lymphadenopathy or area pulmonary consolidation measuring   5.5 cm in greatest cephalocaudal dimension. US CHEST INCLUDING MEDIASTINUM   Final Result   Ultrasound guidance provided for right thoracentesis. XR CHEST PORTABLE   Final Result   Near complete opacification of the right hemithorax with mildly improved   aeration. Mildly increased left-sided interstitial opacities favoring interstitial   edema. Unchanged position of support devices. CT ABDOMEN PELVIS WO CONTRAST Additional Contrast? None   Final Result   Complete opacification of the right hemithorax with a large right pleural   effusion, with compressive atelectasis of the right lung, and shift of the   mediastinum to the left. Minor atelectasis noted in the left lung base. Large amount of ascites. Evidence of the cirrhosis. Stable left renal cyst.      Stable mesenteric and retroperitoneal lymphadenopathy without change since   2019. XR ABDOMEN FOR NG/OG/NE TUBE PLACEMENT   Final Result   Satisfactory position lines and catheters. Right sided chest opacification.   Question extrinsic to the patient         [address]   Final Result   Large right pleural effusion. CT HEAD WO CONTRAST   Final Result   1. Motion compromised study but no acute intracranial abnormality. I would   suggest follow-up if symptoms persist.         IR US GUIDED PARACENTESIS   Final Result   Successful ultrasound-guided paracentesis with specimen sent for laboratory   evaluation. US DUP ABD PEL RETRO SCROT COMPLETE   Final Result   1. Portal and hepatic veins appear grossly patent. 2. Cirrhosis without focal liver lesion demonstrated. 3. Small volume ascites. XR CHEST PORTABLE   Final Result   Significant progression of opacification of the right hemithorax, which is   now complete. This likely represent progressed pleural effusion and   atelectasis. Likely trace left pleural effusion, similar to prior         XR CHEST PORTABLE   Final Result   1. Status post right thoracentesis with new partial aeration of the right   lung. Large pleural effusion persists. 2. Small left pleural effusion. IR GUIDED THORACENTESIS PLEURAL   Final Result   Successful ultrasound guided right thoracentesis with specimen sent for   laboratory evaluation. .         XR CHEST PORTABLE   Final Result   Complete opacification of the right hemithorax likely due to a large right   pleural effusion and atelectasis.          CTA CHEST ABDOMEN PELVIS W CONTRAST    (Results Pending)   XR CHEST PORTABLE    (Results Pending)   CT HEAD WO CONTRAST    (Results Pending)        Scheduled Medicines   Medications:    sodium chloride  1 g Oral TID    metoclopramide  10 mg IntraVENous 3 times per day    insulin regular  0-12 Units SubCUTAneous Q6H    vancomycin  1,250 mg IntraVENous Q24H    enoxaparin  40 mg SubCUTAneous Daily    cefepime  2,000 mg IntraVENous Q8H    albuterol sulfate HFA  4 puff Inhalation Q4H    chlorhexidine  15 mL Mouth/Throat BID    famotidine (PEPCID) injection 20 mg IntraVENous BID    lidocaine PF  5 mL IntraDERmal Once    sodium chloride flush  5-40 mL IntraVENous 2 times per day    sodium chloride flush  5-40 mL IntraVENous 2 times per day    DULoxetine  60 mg Oral Daily    lactulose  20 g Oral TID    levothyroxine  50 mcg Oral Daily    magnesium oxide  200 mg Oral BID    [Held by provider] nadolol  40 mg Oral Daily    rifAXIMin  550 mg Oral BID    spironolactone  100 mg Oral Daily    sodium chloride flush  5-40 mL IntraVENous 2 times per day      Infusions:    propofol 10 mcg/kg/min (05/09/22 1930)    dexmedetomidine HCl in NaCl 0.7 mcg/kg/hr (05/09/22 1615)    furosemide (LASIX) 1mg/ml infusion 5 mg/hr (05/09/22 1842)    sodium chloride Stopped (05/06/22 1431)    norepinephrine 3 mcg/min (05/09/22 2119)    sodium chloride 10 mL/hr at 05/09/22 0700    sodium chloride           Objective:   Vitals: BP (!) 82/51   Pulse 50   Temp 99.1 °F (37.3 °C) (Axillary)   Resp 16   Ht 5' 7.99\" (1.727 m)   Wt 285 lb (129.3 kg)   SpO2 91%   BMI 43.34 kg/m²   General appearance: Patient is sedated, intubated and on ventilator also NG tube feeding  Neck: no JVD or bruit  Thyroid : Normal lobes   Lungs: Has Vesicular Breath sounds related diminished breath sound right-sided pleural effusion was aspirated earlier but it and again reaccumulated  Heart:  regular rate and rhythm  Abdomen: soft, non-tender; bowel sounds normal; no masses,  no organomegaly  Musculoskeletal: Normal  Extremities: extremities normal, , no edema  Neurologic:  Patient is sedated, intubated and on ventilator .     Assessment:     Patient Active Problem List:     Shortness of breath     History of colonic polyps     Alcoholic cirrhosis of liver with ascites (HCC)-Dr Jacques     Mixed hyperlipidemia     Hypertension     History of alcohol abuse     Gastroesophageal reflux disease without esophagitis     Anxiety     Pulmonary nodules     Hyperglycemia     Acquired hypothyroidism     Class 3 severe obesity with body mass index (BMI) of 40.0 to 44.9 in adult [facility])     Bilateral hearing loss     Increased ammonia level     JENNIFER (obstructive sleep apnea)     Portal hypertension (HCC)     SOB (shortness of breath)     Cirrhosis of liver with ascites (HCC)     Secondary esophageal varices without bleeding (HCC)     Gastric polyps     Hearing loss     Sepsis (HCC)     Hepatic encephalopathy (HCC)     Pleural effusion     Acute on chronic respiratory failure with hypoxemia (HCC)     Acute respiratory failure ([address]      Plan:     1. Reviewed POC blood glucose . Labs and X ray results   2. Reviewed Current Medicines   3. On correction bolus of regular insulin  4. Monitor Blood glucose frequently   5. Modified  the dose of Insulin/ other medicines as needed  6. Tube feeding is on hold  7. If unable to tolerate tube feeding should consider starting him on TPN  8. Will follow     .      Rudy Mast MD, MD

## 2022-05-10 NOTE — DISCHARGE INSTR - COC
Continuity of Care Form    Patient Name: Mookie Burnett   :  1945  MRN:  [id number]    Admit date:  2022  Discharge date:  ***    Code Status Order: Full Code   Advance Directives:      Admitting Physician:  Kai Padilla MD  PCP: Yohana Britt MD    Discharging Nurse: [facility] Unit/Room#: 2119/2119-A  Discharging Unit Phone Number: ***    Emergency Contact:   Extended Emergency Contact Information  Primary Emergency Contact: [facility]  Address: [address] [place] [address] Phone: [phone]  Work Phone: [phone]  Mobile Phone: [phone]  Relation: Spouse    Past Surgical History:  Past Surgical History:   Procedure Laterality Date    CHOLECYSTECTOMY      COLONOSCOPY      ENDOSCOPY, COLON, DIAGNOSTIC      FOOT SURGERY      needle removed,not sure which foot, per wife.     UPPER GASTROINTESTINAL ENDOSCOPY N/A 2022    EGD BIOPSY performed by Arie Garner MD at [address] [place] [address]         Immunization History:   Immunization History   Administered Date(s) Administered    COVID-19, Pfizer Purple top, DILUTE for use, 12+ yrs, 30mcg/0.3mL dose 2021, 2021    Influenza, Quadv, adjuvanted, 65 yrs +, IM, PF (Fluad) 10/21/2020    Pneumococcal Conjugate 13-valent (Nggctsd58) 2016    Pneumococcal Polysaccharide (Ldfepsoep96) 10/21/2020    Tdap (Boostrix, Adacel) 2016       Active Problems:  Patient Active Problem List   Diagnosis Code    Shortness of breath R06.02    History of colonic polyps H77.341    Alcoholic cirrhosis of liver with ascites (HCC)-Dr Jacques K70.31    Mixed hyperlipidemia E78.2    Hypertension I10    History of alcohol abuse F10.11    Gastroesophageal reflux disease without esophagitis K21.9    Anxiety F41.9    Pulmonary nodules R91.8    Hyperglycemia R73.9    Acquired hypothyroidism E03.9    Class 3 severe obesity with body mass index (BMI) of 40.0 to 44.9 in adult [facility]) E66.01, Z68.41    Bilateral hearing loss H91.93    Increased ammonia level R79.89    JENNIFER (obstructive sleep apnea) G47.33    Portal hypertension (HCC) K76.6    SOB (shortness of breath) R06.02    Cirrhosis of liver with ascites (HCC) K74.60, R18.8    Secondary esophageal varices without bleeding (HCC) I85.10    Gastric polyps K31.7    Hearing loss H91.90    Sepsis (HCC) A41.9    Hepatic encephalopathy (HCC) K72.90    Pleural effusion J90    Acute on chronic respiratory failure with hypoxemia (HCC) J96.21    Acute respiratory failure (HCC) J96.00       Isolation/Infection:   Isolation            Contact  Droplet          Patient Infection Status       Infection Onset Added Last Indicated Last Indicated By Review Planned Expiration Resolved Resolved By    Rhinovirus 05/02/22 05/02/22 05/02/22 Respiratory Panel, Molecular, with COVID-19 (Restricted: peds pts or suitable admitted adults) 05/04/22       Resolved    COVID-19 (Rule Out) 05/02/22 05/02/22 05/02/22 Respiratory Panel, Molecular, with COVID-19 (Restricted: peds pts or suitable admitted adults) (Ordered)   05/02/22 Rule-Out Test Resulted    COVID-19 (Rule Out) 01/13/22 01/13/22 01/13/22 COVID-19 (Ordered)   01/14/22 Rule-Out Test Resulted            Nurse Assessment:  Last Vital Signs: /76   Pulse 64   Temp 97.4 °F (36.3 °C) (Axillary)   Resp 16   Ht 5' 7.99\" (1.727 m)   Wt 285 lb (129.3 kg)   SpO2 96%   BMI 43.34 kg/m²     Last documented pain score (0-10 scale): Pain Level: 0  Last Weight:   Wt Readings from Last 1 Encounters:   05/04/22 285 lb (129.3 kg)     Mental Status:  {IP PT MENTAL STATUS:20030}    IV Access:  {[facility] IV ACCESS:249285659}    Nursing Mobility/ADLs:  Walking   {CHP DME FPTD:911959361}  Transfer  {CHP DME EWTQ:240783989}  Bathing  {CHP DME QPFS:676703119}  Dressing  {CHP DME TYAK:628279578}  Toileting  {CHP DME OJSU:315996012}  Feeding  {CHP DME KQOL:830101989}  Med Admin  {CHP DME ILRB:872010012}  Med Delivery   [facility] MED Delivery:714258876}    Wound Care Documentation and Therapy:        Elimination:  Continence: Bowel: {YES / GC:51523}  Bladder: {YES / YX:33891}  Urinary Catheter: {Urinary Catheter:740446875}   Colostomy/Ileostomy/Ileal Conduit: {YES / DAVID:43671}  [REMOVED] Rectal Tube-Stool Appearance: Other (Comment) (lactulose )    Date of Last BM: ***    Intake/Output Summary (Last 24 hours) at 5/10/2022 1531  Last data filed at 5/10/2022 1232  Gross per 24 hour   Intake 1911.94 ml   Output 1450 ml   Net 461.94 ml     I/O last 3 completed shifts:   In: 2820.4 [I.V.:1733.9; NG/GT:297; IV Piggyback:789.5]  Out: 2025 [Urine:2025]    Safety Concerns:     [facility] Safety Concerns:137879897}    Impairments/Disabilities:      [facility] Impairments/Disabilities:908759667}    Patient's personal belongings (please select all that are sent with patient):  {CHP DME Belongings:462005539}    RN SIGNATURE:  {Esignature:878508544}    CASE MANAGEMENT/SOCIAL WORK SECTION    Inpatient Status Date: ***    Readmission Risk Assessment Score:  Readmission Risk              Risk of Unplanned Readmission:  26           Discharging to Facility/ Agency   Name:   Address:  Phone:  Fax:    Dialysis Facility (if applicable)   Name:  Address:  Dialysis Schedule:  Phone:  Fax:    / signature: {Esignature:094133523}    PHYSICIAN SECTION    Nutrition Therapy:  Current Nutrition Therapy:   [facility] Diet List:900002286}    Routes of Feeding: {CHP DME Other Feedings:314414431}  Liquids: {Slp liquid thickness:58193}  Daily Fluid Restriction: {CHP DME Yes amt example:888820429}  Last Modified Barium Swallow with Video (Video Swallowing Test): {Done Not Done KEIV:678301721}    Treatments at the Time of Hospital Discharge:   Respiratory Treatments: ***  Oxygen Therapy:  {Therapy; copd oxygen:08029}  Ventilator:    {MH CC Vent EUEU:208714387}    Rehab Therapies: {THERAPEUTIC INTERVENTION:7381510320}  Weight Bearing Status/Restrictions: [address] [facility] CC Weight Bearin}  Other Medical Equipment (for information only, NOT a DME order):  {EQUIPMENT:248251287}  Other Treatments: ***      Prognosis: {Prognosis:3387688003}    Condition at Discharge: [address] [facility] Patient Condition:053369817}    Rehab Potential (if transferring to Rehab): {Prognosis:8128219085}    Recommended Labs or Other Treatments After Discharge: ***    Physician Certification: I certify the above information and transfer of Duarte Hannah  is necessary for the continuing treatment of the diagnosis listed and that he requires {Admit to Appropriate Level of Care:04445} for {GREATER/LESS:685263851} 30 days.      Update Admission H&P: {CHP DME Changes in AANZX:483101270}    PHYSICIAN SIGNATURE:  {Esignature:973803600}

## 2022-05-10 NOTE — PROGRESS NOTES
Pulmonary and Critical Care  Progress Note      VITALS:  BP (!) 97/53   Pulse (!) 49   Temp 97.2 °F (36.2 °C) (Axillary)   Resp 19   Ht 5' 7.99\" (1.727 m)   Wt 285 lb (129.3 kg)   SpO2 95%   BMI 43.34 kg/m²     Subjective:   CHIEF COMPLAINT :SOB     HPI:                The patient is on the vent and sedation is being decreased. His mental status is slowly improving. He failed the vent wean. He has ventricular bigeminy, await Cardiology input. Objective:   PHYSICAL EXAM:    LUNGS:decreased air entry right base  Abd-distended, BS+,NT  Ext- no pedal edema  CVS-s1s2, no murmurs      DATA:    CBC:  No results for input(s): WBC, RBC, HGB, HCT, PLT, MCV, MCH, MCHC, RDW, NRBC, SEGSPCT, BANDSPCT in the last 72 hours. BMP:  Recent Labs     05/09/22  0557 05/09/22  1850 05/10/22  0602   * 136 136   K 3.1* 3.6 3.4*    99 100   CO2 22 26 26   BUN 22 23 25*   CREATININE 1.2 1.2 1.3   CALCIUM 7.8* 8.3 8.9   GLUCOSE 359* 135* 115*      ABG:  Recent Labs     05/08/22  0600 05/10/22  0600   PH 7.52* 7.60*   PO2ART 70* 115*   YPE5SZG 36.0 29.0*   O2SAT 92.8* 96.4     BNP  No results found for: BNP   D-Dimer:  Lab Results   Component Value Date    DDIMER 1606 (H) 09/13/2021      Radiology:   Increased hazy opacification of the right lung, favored to represent a   combination of progressed atelectasis and layering pleural fluid. Progressive underline airspace disease such as pneumonia is not excluded.       Endotracheal tube appears retracted, now measuring about 7.3 cm above the   berny.  This may be related in part to patient position.      1.       Assessment/Plan     Patient Active Problem List    Diagnosis Date Noted    Acute respiratory failure ([facility] 05/04/2022     Priority: Medium    Acute on chronic respiratory failure with hypoxemia (HCC)      Priority: Medium    Pleural effusion 05/02/2022     Priority: Medium    Sepsis ([facility] [address] 03/19/2022    [facility] 03/19/2022    Hearing loss 03/11/2022    Cirrhosis of liver with ascites ([facility] [address]     Secondary esophageal varices without bleeding (HCC)     Gastric polyps     SOB (shortness of breath) 09/13/2021    Portal hypertension ([address] 08/30/2021    JENNIFER (obstructive sleep apnea) 08/09/2021    Bilateral hearing loss 05/07/2021    Increased ammonia level 05/07/2021    Class 3 severe obesity with body mass index (BMI) of 40.0 to 44.9 in adult [facility]) 09/21/2020    Hyperglycemia 12/04/2019    Acquired hypothyroidism 12/04/2019    Pulmonary nodules 17/99/6143    Alcoholic cirrhosis of liver with ascites (HCC)-Dr Jacques 08/08/2019    Mixed hyperlipidemia 08/08/2019    Hypertension 08/08/2019    History of alcohol abuse 08/08/2019    Gastroesophageal reflux disease without esophagitis 08/08/2019    Anxiety 08/08/2019    Shortness of breath 05/26/2019    History of colonic polyps 01/15/2019   Acute Hypoxic resp failure  Recurrent Right pleural effusion 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 JENNIFER  VDRF  Toxic Metabolic encephalopathy  Moderate Malnutrition  Ventricular Bigeminy       1. Tube feeds  2. Abx  3. F/u C&S  4. HD per renal  5. PT/OT  6. Daily Neuro assessment  7. Daily SAT and SBT  8. Kcl  9. Lytes at 18:00  10. BMP, Mg in am  11. Rifaxamin  12. US chest right side  13. Keep sats > 92%  14. CXR in am  13. Prognosis guarded  16.  C.w present management                                                                                              D/w nursing and PMD and more than 30 minutes of CC time spent on the patient    Electronically signed by Janki Cummings MD on 5/10/2022 at 8:56 AM

## 2022-05-10 NOTE — CARE COORDINATION
CM in to see pt. Pt remains on vent. No family at bedside. Cm contacted pt's wife, introduced self and role of CM. Pt and spouse are retired and reside together in a 1 story home. Pt used no DME PTA. Pt does have a cpap but per wife did not use consistently. Pt has PCP and insurance to assist with cost of Rxs. CM did explain that once pt is extubated and medically appropriate  it is expected that he will be evaluated by PT/OT to help determine a safe and appropriate discharge plan. Cm discussed with wife that  At this time, Cm anticipates likely need for Short term rehab upon discharge and prior to return home pending progress. CM to follow.

## 2022-05-10 NOTE — FLOWSHEET NOTE
05/10/22 1139   Encounter Summary   Encounter Overview/Reason  Attempted Encounter   Service Provided For: Patient not available   Referral/Consult From: [facility]   Support System Spouse   Last Encounter  05/10/22   Complexity of Encounter Moderate   Begin Time 1139   End Time  1144   Total Time Calculated 5 min   Encounter    Type Initial Screen/Assessment   Spiritual/Emotional needs   Type Spiritual Support   Plan and Referrals   Plan/Referrals Continue to visit, (comment); Continue Support (comment)

## 2022-05-10 NOTE — PROGRESS NOTES
Pt tubefeed residual 480cc. Dr. Casi Emery notified of high residual. New orders placed for reglan. Per Dr. Casi Emery, hold tubefeed until reglan is given. Then restart at 10ml/hr.

## 2022-05-10 NOTE — PROGRESS NOTES
CARDIOLOGY  NOTE        Name:  Ashlyn Bello /Age/Sex: 1945  (78 y.o. male)   MRN & CSN:  [id number] & [id number] Admission Date/Time: 2022 12:00 PM   Location:  /-A PCP: Chris Pollock, [address] Day: 9        PLAN FROM CARDIOLOGY FOR TODAY:   Had PVCs today we will continue to monitor      - cardiology consult is for: Possible CHF    -  Interval history: Remains on vent    · ASSESSMENT/ PLAN:  1. Recurrent pleural effusions per pulmonary might benefit from pleurodesis  2. Cirrhosis per GI  3. Echo with normal EF  4. Morbid obesity with a BMI of 43.34 suggestive of pickwickian syndrome          Subjective: Todays complain: Patient on vent    HPI:  Madeline Shafer is a 78 y. o.year old who and presents with had concerns including Shortness of Breath (reports difficulty breathing for the past 5-6 days and extremely tired). Chief Complaint   Patient presents with    Shortness of Breath     reports difficulty breathing for the past 5-6 days and extremely tired           Objective: Temperature:  Current - Temp: 97.4 °F (36.3 °C); Max - Temp  Av.9 °F (36.6 °C)  Min: 97.2 °F (36.2 °C)  Max: 99.1 °F (37.3 °C)    Respiratory Rate : Resp  Av.5  Min: 16  Max: 30    Pulse Range: Pulse  Av  Min: 48  Max: 99    Blood Presuure Range:  Systolic (08MEI), MMP:457 , Min:82 , HJE:848   ; Diastolic (78EYM), PR, Min:51, Max:77      Pulse ox Range: SpO2  Av.3 %  Min: 85 %  Max: 100 %    24hr I & O:      Intake/Output Summary (Last 24 hours) at 5/10/2022 1352  Last data filed at 5/10/2022 1232  Gross per 24 hour   Intake 1911.94 ml   Output 1450 ml   Net 461.94 ml         /76   Pulse 63   Temp 97.4 °F (36.3 °C) (Axillary)   Resp 17   Ht 5' 7.99\" (1.727 m)   Wt 285 lb (129.3 kg)   SpO2 100%   BMI 43.34 kg/m²           Review of Systems:     On vent    TELEMETRY: Sinus    has a past medical history of Anxiety, Cirrhosis, alcoholic ([address] GERD (gastroesophageal reflux disease), GERD (gastroesophageal reflux disease), Hyperlipidemia, Hypertension, Portal hypertension ([address] Pulmonary nodules, Sleep apnea, and SOB (shortness of breath). has a past surgical history that includes Cholecystectomy; Vasectomy; Colonoscopy; Endoscopy, colon, diagnostic; Foot surgery; and Upper gastrointestinal endoscopy (N/A, 1/19/2022). Physical Exam:  General: On vent  Head:normal  Eye: Pupils equal and round  Neck:  No JVD, no carotid bruit noted   Chest:  Clear to auscultation, no signs of respiratory distress  Cardiovascular:  Normal rate and rhythm. S1 and S2 noted.  No murmurs rubs or gallops  Abdomen:   nontender  Extremities:  +2 edema  Pulses; palpable  Neuro: on vent    Medications:    potassium phosphate IVPB  20 mmol IntraVENous Once    metoclopramide  10 mg IntraVENous Q6H    potassium chloride  20 mEq IntraVENous Q1H    sodium chloride  1 g Oral TID    insulin regular  0-12 Units SubCUTAneous Q6H    enoxaparin  40 mg SubCUTAneous Daily    albuterol sulfate HFA  4 puff Inhalation Q4H    chlorhexidine  15 mL Mouth/Throat BID    famotidine (PEPCID) injection  20 mg IntraVENous BID    lidocaine PF  5 mL IntraDERmal Once    sodium chloride flush  5-40 mL IntraVENous 2 times per day    sodium chloride flush  5-40 mL IntraVENous 2 times per day    DULoxetine  60 mg Oral Daily    lactulose  20 g Oral TID    levothyroxine  50 mcg Oral Daily    magnesium oxide  200 mg Oral BID    [Held by provider] nadolol  40 mg Oral Daily    rifAXIMin  550 mg Oral BID    spironolactone  100 mg Oral Daily    sodium chloride flush  5-40 mL IntraVENous 2 times per day      propofol Stopped (05/10/22 0815)    dexmedetomidine HCl in NaCl 0.8 mcg/kg/hr (05/10/22 1311)    furosemide (LASIX) 1mg/ml infusion 5 mg/hr (05/10/22 1148)    sodium chloride Stopped (05/06/22 1431)    norepinephrine Stopped (05/10/22 0918)    sodium chloride Stopped (05/10/22 0144)    sodium chloride       potassium chloride **OR** potassium chloride, fentaNYL, magnesium sulfate, sodium chloride flush, sodium chloride, sodium phosphate IVPB **OR** sodium phosphate IVPB, sodium chloride flush, sodium chloride, albuterol sulfate HFA, sodium chloride flush, sodium chloride, nicotine polacrilex, acetaminophen **OR** acetaminophen, ondansetron, ALPRAZolam    Lab Data:  CBC:   No results for input(s): WBC, HGB, HCT, MCV, PLT in the last 72 hours. BMP:   Recent Labs     05/08/22  0650 05/08/22  0650 05/09/22  0557 05/09/22  1850 05/10/22  0602   *   < > 128* 136 136   K 4.1   < > 3.1* 3.6 3.4*   CL 97*   < > 104 99 100   CO2 27   < > 22 26 26   PHOS 2.7  --  2.3*  --  2.3*   BUN 23   < > 22 23 25*   CREATININE 1.4*   < > 1.2 1.2 1.3    < > = values in this interval not displayed. LIVER PROFILE:   No results for input(s): AST, ALT, LIPASE, BILIDIR, BILITOT, ALKPHOS in the last 72 hours. Invalid input(s): AMYLASE,  ALB  PT/INR:   Recent Labs     05/08/22  0650 05/09/22  0557 05/10/22  0602   PROTIME 15.6* 15.8* 13.9   INR 1.21 1.22 1.08     APTT: No results for input(s): APTT in the last 72 hours. BNP:  No results for input(s): BNP in the last 72 hours. TROPONIN: No results for input(s): TROPONINI in the last 72 hours. No results for input(s): TROPONINT in the last 72 hours. Labs, consult, tests reviewed                    Rosendo Lucia MD, PA-C 5/10/2022 1:52 PM     Please note this report has been partially produced using speech recognition software and may contain errors related to that system including errors in grammar, punctuation, and spelling, as well as words and phrases that may be inappropriate. If there are any questions or concerns please feel free to contact the dictating provider for clarification.

## 2022-05-10 NOTE — PROGRESS NOTES
Comprehensive Nutrition Assessment    Type and Reason for Visit:  Reassess    Nutrition Recommendations/Plan:   1. Restart EN as soon as possible  2. Correct depleted K+ and Phos levels  3. Reglan BID as needed  4. Advance to goal rate as tolerated  5. Will monitor GI tolerance, nutrition status, poc     Malnutrition Assessment:  Malnutrition Status:  Insufficient data (05/05/22 1508)    Context:  Acute Illness       Nutrition Assessment:    visited pt at bedside, RN at bedside at time of visit, per RN, pt had residual of 480 this morning, TF was stopped, RN giving Reglan and will restart TF at 10 ml/hr, pt remains intubated, +Lasix ggt, +precedex ggt, will continue to follow at high nutrition risk    Nutrition Related Findings:    K+ 3.4, Phos 2.3 Wound Type: None       Current Nutrition Intake & Therapies:    Average Meal Intake: NPO  Average Supplements Intake: NPO  ADULT TUBE FEEDING; Orogastric; Peptide Based High Protein; Continuous; 10; Yes; 10; Q 6 hours; 45; 30; Q 4 hours; Protein; Proteinex BID (not running at this time)    Anthropometric Measures:  Height: 5' 7.99\" (172.7 cm)  Ideal Body Weight (IBW): 154 lbs (70 kg)    Admission Body Weight:  (N/A)  Current Body Weight: 285 lb 0.9 oz (129.3 kg) (stated weight), 185.1 % IBW.  Weight Source: Bed Scale  Current BMI (kg/m2): 43.4  Weight Adjustment For: No Adjustment  BMI Categories: Obese Class 3 (BMI 40.0 or greater)    Estimated Daily Nutrient Needs:     Weight Used for Energy Requirements: Current  Energy (kcal/day): 1891 ([facility])  Weight Used for Protein Requirements: Ideal  Protein (g/day): 140 (2.0 g/kg)  Nutrition Diagnosis:   · Inadequate oral intake related to acute injury/trauma as evidenced by NPO or clear liquid status due to medical condition    Nutrition Interventions:   Food and/or Nutrient Delivery:  (restart EN)  Nutrition Education/Counseling: No recommendation at this time  Coordination of Nutrition Care: Continue to monitor while inpatient  Plan of Care discussed with: RN    Goals:  Previous Goal Met: Progressing toward Goal(s)  Goals: Tolerate nutrition support at goal rate,within 2 days     Nutrition Monitoring and Evaluation:   Behavioral-Environmental Outcomes: None Identified  Food/Nutrient Intake Outcomes: Enteral Nutrition Intake/Tolerance  Physical Signs/Symptoms Outcomes: Biochemical Data,GI Status,Hemodynamic Status,Fluid Status or Edema,Weight,Skin    Discharge Planning:     Too soon to determine     Ginger Camejo [address], [address], [place]  Contact: [phone]

## 2022-05-10 NOTE — PROGRESS NOTES
Hospitalist Progress Note      Name:  Kelly Latham /Age/Sex: 1945  (78 y.o. male)   MRN & CSN:  [id number] & [id number] Admission Date/Time: 2022 12:00 PM   Location:  -A PCP: Lorie Bailey, [address] Day: 9    Assessment and Plan:   Kelly Latham is a 78 y.o.  male  who presents with Pleural effusion    1) Acute Hypoxic Respiratory Failure  -Likely due to large Rt sided pleural effusion as well as Rhinovirus  -Mechanical intubation on 2022  -S/P Rt thoracentesis with removal of 2L of fluid 22  -Per Pulm, fluid seems to be due to ascites  -Wean down FiO2 as tolerated  -Pulm on board for vent management    2) Decompensated Alcoholic liver cirrhosis with ascites  -S/P Paracentesis with drainage of 6.9L on , analysis consistent with portal HTN of cirrhotic origin. Neg for SBP.  -Continue lasix and Aldactone  -GI on board, might need TIPS procedure if Rt sided pleural effusion continues and due to hepatic hydothorax  -Continue to monitor    3) Acute Hepatic Encephalopathy  -Continue Lactulose and Rifaximin  -Aim to achieve 2-3 BM daily    4) Hypotension  -Likely due to third spacing  -Wean off pressor as tolerated    Other chronic medical conditions: Medication resumed unless contraindicated    CKD stage III  History of hypothyroidism  Morbid obesity      Diet ADULT TUBE FEEDING; Orogastric; Peptide Based High Protein; Continuous; 10; Yes; 10; Q 6 hours; 70; 30; Q 4 hours   DVT Prophylaxis [] Lovenox, []  Heparin, [] SCDs, [] Ambulation   GI Prophylaxis [] PPI,  [] H2 Blocker,  [] Carafate,  [] Diet/Tube Feeds   Code Status Full Code   Disposition TBD   MDM      History of Present Illness:     Patient was seen and examined  On mechanical ventilation on sedation  RASS of -5      Objective:        Intake/Output Summary (Last 24 hours) at 5/10/2022 1259  Last data filed at 5/10/2022 1232  Gross per 24 hour   Intake 1911.94 ml   Output 2325 ml   Net -413.06 ml      Vitals:   Vitals:    05/10/22 1232   BP: 108/76   Pulse: 63   Resp: 17   Temp: 97.4 °F (36.3 °C)   SpO2: 100%     Physical Exam:   GEN on mechanical ventilation on sedation  EYES Pupils are equally round. No scleral erythema, discharge, or conjunctivitis. HENT Mucous membranes are moist. Oral pharynx without exudates, no evidence of thrush. NECK Supple, no apparent thyromegaly or masses. RESP Clear to auscultation, no wheezes, rales or rhonchi. Symmetric chest movement while on mechanical ventilation  CARDIO/VASC S1/S2 auscultated. Regular rate without appreciable murmurs, rubs, or gallops. No JVD or carotid bruits. Peripheral pulses equal bilaterally and palpable. No peripheral edema. GI Abdomen is soft without significant tenderness, masses, or guarding. Bowel sounds are normoactive. Rectal exam deferred.  No costovertebral angle tenderness. Schilling catheter is present. HEME/LYMPH No palpable cervical lymphadenopathy and no hepatosplenomegaly. No petechiae or ecchymoses. MSK No gross joint deformities. SKIN Normal coloration, warm, dry.   NEURO on mechanical ventilation and sedation, RASS of -5    Medications:   Medications:    potassium phosphate IVPB  20 mmol IntraVENous Once    metoclopramide  10 mg IntraVENous Q6H    potassium chloride  20 mEq IntraVENous Q1H    sodium chloride  1 g Oral TID    insulin regular  0-12 Units SubCUTAneous Q6H    enoxaparin  40 mg SubCUTAneous Daily    albuterol sulfate HFA  4 puff Inhalation Q4H    chlorhexidine  15 mL Mouth/Throat BID    famotidine (PEPCID) injection  20 mg IntraVENous BID    lidocaine PF  5 mL IntraDERmal Once    sodium chloride flush  5-40 mL IntraVENous 2 times per day    sodium chloride flush  5-40 mL IntraVENous 2 times per day    DULoxetine  60 mg Oral Daily    lactulose  20 g Oral TID    levothyroxine  50 mcg Oral Daily    magnesium oxide  200 mg Oral BID    [Held by provider] nadolol  40 mg Oral Daily    rifAXIMin  550 mg Oral BID    spironolactone  100 mg Oral Daily    sodium chloride flush  5-40 mL IntraVENous 2 times per day      Infusions:    propofol Stopped (05/10/22 0815)    dexmedetomidine HCl in NaCl 0.6 mcg/kg/hr (05/10/22 1036)    furosemide (LASIX) 1mg/ml infusion 5 mg/hr (05/10/22 1148)    sodium chloride Stopped (05/06/22 1431)    norepinephrine Stopped (05/10/22 3568)    sodium chloride Stopped (05/10/22 0144)    sodium chloride       PRN Meds: potassium chloride, 20 mEq, PRN   Or  potassium chloride, 10 mEq, PRN  fentaNYL, 25 mcg, Q2H PRN  magnesium sulfate, 1,000 mg, PRN  sodium chloride flush, 5-40 mL, PRN  sodium chloride, , PRN  sodium phosphate IVPB, 15 mmol, PRN   Or  sodium phosphate IVPB, 15 mmol, PRN  sodium chloride flush, 5-40 mL, PRN  sodium chloride, , PRN  albuterol sulfate HFA, 2 puff, Q4H PRN  sodium chloride flush, 10 mL, PRN  sodium chloride, , PRN  nicotine polacrilex, 2 mg, Q1H PRN  acetaminophen, 650 mg, Q6H PRN   Or  acetaminophen, 650 mg, Q6H PRN  ondansetron, 4 mg, Q6H PRN  ALPRAZolam, 0.5 mg, Daily PRN          Electronically signed by Toni Courtney MD on 5/10/2022 at 12:59 PM

## 2022-05-10 NOTE — PROGRESS NOTES
Trialed SAT on pt. Proceeded with SBT. After switching pt to spontaneous on the ventilator, pt desatted to low 80s. Dr. Sharon Irwin notified and orders received to switch pt back to full support and try again tomorrow. Propofol to remain stopped.  Precedex restarted at 0.4mcg/kg/hr

## 2022-05-11 ENCOUNTER — APPOINTMENT (OUTPATIENT)
Dept: GENERAL RADIOLOGY | Age: 77
DRG: 432 | End: 2022-05-11
Payer: MEDICARE

## 2022-05-11 LAB
AMMONIA: 57 UMOL/L (ref 16–60)
ANION GAP SERPL CALCULATED.3IONS-SCNC: 10 MMOL/L (ref 4–16)
BASE EXCESS MIXED: 4.2 (ref 0–1.2)
BASOPHILS ABSOLUTE: 0.1 K/CU MM
BASOPHILS RELATIVE PERCENT: 0.9 % (ref 0–1)
BUN BLDV-MCNC: 28 MG/DL (ref 6–23)
CALCIUM SERPL-MCNC: 8.5 MG/DL (ref 8.3–10.6)
CARBON MONOXIDE, BLOOD: 2.3 % (ref 0–5)
CHLORIDE BLD-SCNC: 101 MMOL/L (ref 99–110)
CO2 CONTENT: 28.4 MMOL/L (ref 19–24)
CO2: 27 MMOL/L (ref 21–32)
COMMENT: ABNORMAL
CREAT SERPL-MCNC: 1.5 MG/DL (ref 0.9–1.3)
CULTURE: ABNORMAL
DIFFERENTIAL TYPE: ABNORMAL
EOSINOPHILS ABSOLUTE: 0.6 K/CU MM
EOSINOPHILS RELATIVE PERCENT: 4.6 % (ref 0–3)
GFR AFRICAN AMERICAN: 55 ML/MIN/1.73M2
GFR NON-AFRICAN AMERICAN: 46 ML/MIN/1.73M2
GLUCOSE BLD-MCNC: 104 MG/DL (ref 70–99)
GLUCOSE BLD-MCNC: 106 MG/DL (ref 70–99)
GLUCOSE BLD-MCNC: 136 MG/DL (ref 70–99)
GLUCOSE BLD-MCNC: 146 MG/DL (ref 70–99)
GLUCOSE BLD-MCNC: 99 MG/DL (ref 70–99)
GRAM SMEAR: ABNORMAL
HCO3 ARTERIAL: 27.3 MMOL/L (ref 18–23)
HCT VFR BLD CALC: 42 % (ref 42–52)
HEMOGLOBIN: 13.4 GM/DL (ref 13.5–18)
HIGH SENSITIVE C-REACTIVE PROTEIN: 202.5 MG/L
IMMATURE NEUTROPHIL %: 0.6 % (ref 0–0.43)
INR BLD: 1.08 INDEX
LYMPHOCYTES ABSOLUTE: 1.2 K/CU MM
LYMPHOCYTES RELATIVE PERCENT: 8.7 % (ref 24–44)
Lab: ABNORMAL
MAGNESIUM: 2 MG/DL (ref 1.8–2.4)
MCH RBC QN AUTO: 31.6 PG (ref 27–31)
MCHC RBC AUTO-ENTMCNC: 31.9 % (ref 32–36)
MCV RBC AUTO: 99.1 FL (ref 78–100)
METHEMOGLOBIN ARTERIAL: 1.6 %
MONOCYTES ABSOLUTE: 1.8 K/CU MM
MONOCYTES RELATIVE PERCENT: 13.1 % (ref 0–4)
NUCLEATED RBC %: 0 %
O2 SATURATION: 91.4 % (ref 96–97)
PCO2 ARTERIAL: 35 MMHG (ref 32–45)
PDW BLD-RTO: 14.9 % (ref 11.7–14.9)
PH BLOOD: 7.5 (ref 7.34–7.45)
PHOSPHORUS: 3.1 MG/DL (ref 2.5–4.9)
PLATELET # BLD: 195 K/CU MM (ref 140–440)
PMV BLD AUTO: 10.9 FL (ref 7.5–11.1)
PO2 ARTERIAL: 66 MMHG (ref 75–100)
POTASSIUM SERPL-SCNC: 3.5 MMOL/L (ref 3.5–5.1)
PROTHROMBIN TIME: 14 SECONDS (ref 11.7–14.5)
RBC # BLD: 4.24 M/CU MM (ref 4.6–6.2)
SEGMENTED NEUTROPHILS ABSOLUTE COUNT: 9.8 K/CU MM
SEGMENTED NEUTROPHILS RELATIVE PERCENT: 72.1 % (ref 36–66)
SODIUM BLD-SCNC: 138 MMOL/L (ref 135–145)
SPECIMEN: ABNORMAL
TOTAL IMMATURE NEUTOROPHIL: 0.08 K/CU MM
TOTAL NUCLEATED RBC: 0 K/CU MM
WBC # BLD: 13.6 K/CU MM (ref 4–10.5)

## 2022-05-11 PROCEDURE — 83735 ASSAY OF MAGNESIUM: CPT

## 2022-05-11 PROCEDURE — 2580000003 HC RX 258: Performed by: INTERNAL MEDICINE

## 2022-05-11 PROCEDURE — 85610 PROTHROMBIN TIME: CPT

## 2022-05-11 PROCEDURE — 2500000003 HC RX 250 WO HCPCS: Performed by: NURSE PRACTITIONER

## 2022-05-11 PROCEDURE — 94640 AIRWAY INHALATION TREATMENT: CPT

## 2022-05-11 PROCEDURE — 6370000000 HC RX 637 (ALT 250 FOR IP): Performed by: INTERNAL MEDICINE

## 2022-05-11 PROCEDURE — 94761 N-INVAS EAR/PLS OXIMETRY MLT: CPT

## 2022-05-11 PROCEDURE — 94003 VENT MGMT INPAT SUBQ DAY: CPT

## 2022-05-11 PROCEDURE — 99233 SBSQ HOSP IP/OBS HIGH 50: CPT | Performed by: INTERNAL MEDICINE

## 2022-05-11 PROCEDURE — 99232 SBSQ HOSP IP/OBS MODERATE 35: CPT

## 2022-05-11 PROCEDURE — 2700000000 HC OXYGEN THERAPY PER DAY

## 2022-05-11 PROCEDURE — 82803 BLOOD GASES ANY COMBINATION: CPT

## 2022-05-11 PROCEDURE — 2500000003 HC RX 250 WO HCPCS: Performed by: INTERNAL MEDICINE

## 2022-05-11 PROCEDURE — 6370000000 HC RX 637 (ALT 250 FOR IP): Performed by: NURSE PRACTITIONER

## 2022-05-11 PROCEDURE — 6360000002 HC RX W HCPCS: Performed by: HOSPITALIST

## 2022-05-11 PROCEDURE — 85025 COMPLETE CBC W/AUTO DIFF WBC: CPT

## 2022-05-11 PROCEDURE — 6360000002 HC RX W HCPCS: Performed by: INTERNAL MEDICINE

## 2022-05-11 PROCEDURE — 86141 C-REACTIVE PROTEIN HS: CPT

## 2022-05-11 PROCEDURE — 2000000000 HC ICU R&B

## 2022-05-11 PROCEDURE — 71045 X-RAY EXAM CHEST 1 VIEW: CPT

## 2022-05-11 PROCEDURE — 84100 ASSAY OF PHOSPHORUS: CPT

## 2022-05-11 PROCEDURE — 80048 BASIC METABOLIC PNL TOTAL CA: CPT

## 2022-05-11 PROCEDURE — 99232 SBSQ HOSP IP/OBS MODERATE 35: CPT | Performed by: INTERNAL MEDICINE

## 2022-05-11 PROCEDURE — 82140 ASSAY OF AMMONIA: CPT

## 2022-05-11 PROCEDURE — 82962 GLUCOSE BLOOD TEST: CPT

## 2022-05-11 PROCEDURE — 36600 WITHDRAWAL OF ARTERIAL BLOOD: CPT

## 2022-05-11 RX ORDER — ALBUTEROL SULFATE 90 UG/1
4 AEROSOL, METERED RESPIRATORY (INHALATION) 2 TIMES DAILY
Status: DISCONTINUED | OUTPATIENT
Start: 2022-05-11 | End: 2022-05-11

## 2022-05-11 RX ORDER — FUROSEMIDE 10 MG/ML
40 INJECTION INTRAMUSCULAR; INTRAVENOUS 2 TIMES DAILY
Status: DISCONTINUED | OUTPATIENT
Start: 2022-05-11 | End: 2022-05-18

## 2022-05-11 RX ADMIN — LEVOTHYROXINE SODIUM 50 MCG: 25 TABLET ORAL at 05:35

## 2022-05-11 RX ADMIN — ALBUTEROL SULFATE 4 PUFF: 90 AEROSOL, METERED RESPIRATORY (INHALATION) at 07:41

## 2022-05-11 RX ADMIN — DEXMEDETOMIDINE HYDROCHLORIDE 0.6 MCG/KG/HR: 4 INJECTION, SOLUTION INTRAVENOUS at 02:40

## 2022-05-11 RX ADMIN — ALBUTEROL SULFATE 4 PUFF: 90 AEROSOL, METERED RESPIRATORY (INHALATION) at 03:33

## 2022-05-11 RX ADMIN — SODIUM CHLORIDE, PRESERVATIVE FREE 10 ML: 5 INJECTION INTRAVENOUS at 08:43

## 2022-05-11 RX ADMIN — MAGNESIUM OXIDE 400 MG (241.3 MG MAGNESIUM) TABLET 200 MG: TABLET at 08:42

## 2022-05-11 RX ADMIN — RIFAXIMIN 550 MG: 550 TABLET ORAL at 08:42

## 2022-05-11 RX ADMIN — LACTULOSE 20 G: 10 SOLUTION ORAL at 05:35

## 2022-05-11 RX ADMIN — SODIUM CHLORIDE 1 G: 1 TABLET ORAL at 08:42

## 2022-05-11 RX ADMIN — ENOXAPARIN SODIUM 40 MG: 100 INJECTION SUBCUTANEOUS at 08:40

## 2022-05-11 RX ADMIN — CHLORHEXIDINE GLUCONATE 0.12% ORAL RINSE 15 ML: 1.2 LIQUID ORAL at 08:40

## 2022-05-11 RX ADMIN — SODIUM CHLORIDE, PRESERVATIVE FREE 20 MG: 5 INJECTION INTRAVENOUS at 08:40

## 2022-05-11 RX ADMIN — SPIRONOLACTONE 100 MG: 50 TABLET ORAL at 08:42

## 2022-05-11 RX ADMIN — SODIUM CHLORIDE, PRESERVATIVE FREE 10 ML: 5 INJECTION INTRAVENOUS at 21:52

## 2022-05-11 RX ADMIN — FUROSEMIDE 40 MG: 10 INJECTION, SOLUTION INTRAMUSCULAR; INTRAVENOUS at 17:00

## 2022-05-11 RX ADMIN — FUROSEMIDE 40 MG: 10 INJECTION, SOLUTION INTRAMUSCULAR; INTRAVENOUS at 08:41

## 2022-05-11 ASSESSMENT — PAIN SCALES - GENERAL
PAINLEVEL_OUTOF10: 0

## 2022-05-11 ASSESSMENT — PULMONARY FUNCTION TESTS
PIF_VALUE: 27
PIF_VALUE: 26

## 2022-05-11 NOTE — PROGRESS NOTES
05/11/22 0333   Patient Observation   Pulse 69   Resp 25   SpO2 99 %   Breath Sounds   Right Upper Lobe Diminished   Right Middle Lobe Diminished   Right Lower Lobe Diminished   Left Upper Lobe Diminished   Left Lower Lobe Diminished   Airway Clearance   Suction ET Tube   Sputum Method Obtained Endotracheal   Sputum Amount Small   Sputum Color/Odor White;Yellow   Sputum Consistency Thick   Vent Settings   FiO2  35 %   Resp Rate (Set) 16 bmp   Vt (Set, mL) 500 mL   PEEP/CPAP (cmH2O) 5   Vent Patient Data (Readings)   Vt Exhaled 475 mL   Rate Measured 16 br/min   Minute Volume 7.26 Liters   Peak Flow 55 L/min   I:E Ratio 1:2.8   Sensitivity 3   Peak Inspiratory Pressure 26 cmH2O   Mean Airway Pressure 9.6 cmH20   Vent Alarm Settings   High Pressure  40 cmH2O   Low Minute Volume Alarm 2.5 L/min   Low Exhaled Vt  250 mL   Vt High  1000 mL   Apnea (Secs) 20 secs   Ambu Bag With Mask At Bedside Yes   Additional Respiratory Assessments   Position Semi-Evangelista's   Humidification Source HME   Cuff Pressure (cm H2O)   (mov)   ETT (adult)   Placement Date/Time: 05/05/22 (c) 4041   Placement Verified By: Capnometry  Preoxygenation: Yes  Mask Ventilation: Ventilated by mask (1)  Technique: Video laryngoscopy  Airway Tube Size: 8 mm  Laryngoscope: (c)   Blade Size: 4  Grade View: Full view . ..    Secured At 26 cm   Measured From Lips   ETT Placement Right   Secured By Commercial tube becker   Site Assessment Dry   Cuff Pressure   (mov)   Ventilator Associated Pneumonia Bundle   Elevation of Head of Bed to 30-45 Degrees  Yes   Respiratory   Respiratory (WDL) X

## 2022-05-11 NOTE — PROGRESS NOTES
Neurology Service Progress Note  [facility]  Patient Name: Jeffery Serrato  : 1945        Subjective:   Reason for consult: AMS  Patient seen and examined. Chart reviewed in detail. Patient remains sedated on vent. He does open his eye to name today and tracks me in the room. Did not follow commands, but moving extremities x4. Will f/u on repeat CTH. Past Medical History:   Diagnosis Date    Anxiety     Cirrhosis, alcoholic ([address]     GERD (gastroesophageal reflux disease)     GERD (gastroesophageal reflux disease)     Hyperlipidemia     Hypertension     Portal hypertension (HCC)     Pulmonary nodules     Sleep apnea     SOB (shortness of breath)     :   Past Surgical History:   Procedure Laterality Date    CHOLECYSTECTOMY      COLONOSCOPY      ENDOSCOPY, COLON, DIAGNOSTIC      FOOT SURGERY      needle removed,not sure which foot, per wife.     UPPER GASTROINTESTINAL ENDOSCOPY N/A 2022    EGD BIOPSY performed by Uday Lopez MD at [facility] [address]       Medications:  Scheduled Meds:   sodium chloride  1 g Oral TID    insulin regular  0-12 Units SubCUTAneous Q6H    enoxaparin  40 mg SubCUTAneous Daily    albuterol sulfate HFA  4 puff Inhalation Q4H    chlorhexidine  15 mL Mouth/Throat BID    famotidine (PEPCID) injection  20 mg IntraVENous BID    lidocaine PF  5 mL IntraDERmal Once    sodium chloride flush  5-40 mL IntraVENous 2 times per day    DULoxetine  60 mg Oral Daily    lactulose  20 g Oral TID    levothyroxine  50 mcg Oral Daily    magnesium oxide  200 mg Oral BID    [Held by provider] nadolol  40 mg Oral Daily    rifAXIMin  550 mg Oral BID    spironolactone  100 mg Oral Daily     Continuous Infusions:   propofol Stopped (05/10/22 0815)    dexmedetomidine HCl in NaCl 0.8 mcg/kg/hr (05/10/22 2000)    furosemide (LASIX) 1mg/ml infusion 5 mg/hr (05/10/22 2000)    sodium chloride Stopped (22 1431)    norepinephrine 3 mcg/min (05/10/22 2000)    sodium chloride Stopped (05/10/22 0144)    sodium chloride       PRN Meds:.potassium chloride **OR** potassium chloride, fentaNYL, magnesium sulfate, sodium chloride flush, sodium chloride, sodium phosphate IVPB **OR** sodium phosphate IVPB, sodium chloride flush, sodium chloride, albuterol sulfate HFA, sodium chloride flush, sodium chloride, nicotine polacrilex, acetaminophen **OR** acetaminophen, ondansetron, ALPRAZolam    Allergies   Allergen Reactions    Lisinopril Swelling     Tongue swelling      Zetia [Ezetimibe] Swelling     Facial swelling    Atorvastatin     Codeine Other (See Comments)     Blood pressure drops    Hydrochlorothiazide     Hydroxyzine      Fatigue and depressed    Lexapro [Escitalopram Oxalate]      Increased depression    Pravastatin      Social History     Socioeconomic History    Marital status:      Spouse name: Not on file    Number of children: Not on file    Years of education: Not on file    Highest education level: Not on file   Occupational History     Comment: Retired    Tobacco Use    Smoking status: Former Smoker     Packs/day: 1.00     Years: 14.00     Pack years: 14.00     Quit date:      Years since quittin.3    Smokeless tobacco: Never Used   Vaping Use    Vaping Use: Never used   Substance and Sexual Activity    Alcohol use: Not Currently    Drug use: Never    Sexual activity: Yes     Partners: Female   Other Topics Concern    Not on file   Social History Narrative    Not on file     Social Determinants of Health     Financial Resource Strain: Low Risk     Difficulty of Paying Living Expenses: Not hard at all   Food Insecurity: No Food Insecurity    Worried About Running Out of Food in the Last Year: Never true    Hector of Food in the Last Year: Never true   Transportation Needs:     Lack of Transportation (Medical): Not on file    Lack of Transportation (Non-Medical):  Not on file   Physical Activity:     Days of Exercise per Week: Not on file    Minutes of Exercise per Session: Not on file   Stress:     Feeling of Stress : Not on file   Social Connections:     Frequency of Communication with Friends and Family: Not on file    Frequency of Social Gatherings with Friends and Family: Not on file    Attends Cheondoism Services: Not on file    Active Member of [address] [place] [address] or Organizations: Not on file    Attends Club or Organization Meetings: Not on file    Marital Status: Not on file   Intimate Partner Violence:     Fear of Current or Ex-Partner: Not on file    Emotionally Abused: Not on file    Physically Abused: Not on file    Sexually Abused: Not on file   Housing Stability:     Unable to Pay for Housing in the Last Year: Not on file    Number of [place] in the Last Year: Not on file    Unstable Housing in the Last Year: Not on file      Family History   Problem Relation Age of Onset    Hypertension Brother     Diabetes Other          Physical Exam:       Vitals:    05/10/22 1930 05/10/22 2000 05/10/22 2030 05/10/22 2100   BP: 117/60 122/68 (!) 101/54 (!) 112/54   Pulse: 78 100 73 69   Resp: 16 27 21 20   Temp:  99.1 °F (37.3 °C)     TempSrc:  Oral     SpO2: 99% 100% 98% 99%   Weight:       Height:           Wt Readings from Last 3 Encounters:   05/04/22 285 lb (129.3 kg)   04/25/22 282 lb (127.9 kg)   03/21/22 298 lb 11.6 oz (135.5 kg)     Temp Readings from Last 3 Encounters:   05/10/22 99.1 °F (37.3 °C) (Oral)   04/25/22 97.2 °F (36.2 °C) (Infrared)   03/21/22 98.3 °F (36.8 °C) (Oral)     BP Readings from Last 3 Encounters:   05/10/22 (!) 112/54   03/21/22 (!) 128/55   03/11/22 122/64     Pulse Readings from Last 3 Encounters:   05/10/22 69   04/25/22 67   03/21/22 75        Gen: Sedated and Intubated on vent  HEENT: NC/AT, oculocephalics intact,  PERRL, mmm, neck supple, no meningeal signs; Heart: SB on monitor  Lungs:  On vent  Ext: no edema,   Psych: ROXANN  Skin: no rashes or lesions    NEUROLOGIC EXAM:    Mental Status:  NAD, Sedated on vent, opens eyes to name today. Cranial Nerve Exam:   CN II-XII: PERRL, , no nystagmus, no gaze paresis,  Face appears symmetrical, tongue midline against ET tube; present cough reflex, present gag reflex, present corneal reflex    Motor Exam:       Strength  movement to painful stimuli x 4  Tone and bulk normal   ROXANN pronator drift    Deep Tendon Reflexes: 2/4 biceps, triceps, brachioradialis, patellar, and achilles b/l; flexor plantar responses b/l    Sensation:movement to painful stimuli x 4    Coordination/Cerebellum:       Tremors--none      Rapidly alternating movements: ROXANN            Heel-to-Shin: ROXANN      Finger-to-Nose: ROXANN  Gait and stance:      Gait: deferred      LABS:        CBC: No results for input(s): WBC, RBC, HGB, HCT, PLT, MCV in the last 72 hours. BMP:    Recent Labs     05/10/22  0602 05/10/22  1215 05/10/22  1805     --  139   K 3.4*   < > 4.4     --  102   CO2 26  --  26   BUN 25*  --   --    CREATININE 1.3  --   --    GLUCOSE 115*  --   --    CALCIUM 8.9  --   --     < > = values in this interval not displayed. IMAGING:    CTH         Impression   1. Motion compromised study but no acute intracranial abnormality.  I would   suggest follow-up if symptoms persist.       Above imaging reviewed in detail. ASSESSMENT/PLAN:     78 y.o. -male with PMH of anxiety, alcoholic cirrhosis, GERD, sleep apnea, HLD, and HTN presenting to [facility] due to difficulty breathing. Patient was found to have pleural effusion. Neurology consulted for altered mental status. Patient opens eyes to name today. 1. Altered mental status likely due to toxic metabolic encephalopathy in the setting of leukocytosis, hyponatremia, hypokalemia, liver failure,  and hypomagnesia. Patient also has abnormality  and is on meropenem for leukocytosis of unknown origin. We will repeat CT head.   Patient's mentation will likely  slowly improve upon metabolic improvements.   --CTH as above, repeat CT head ordered  --Full neurological examination limitied due to patient being on sedation, Recommendations to decrease sedation as this will provide opportunity to further examine patient's current neurological status. Patient discussed with attending physician Dr. Tamela Beckwith     Thank you for allowing us to participate in the care of your patient. If there are any questions regarding evaluation please feel free to contact us. Juanita Belcheryegerhardkyasmin, MOHINDER - CNP, 5/10/2022    -----------------------    Suspected toxic metabolic encephalopathy in setting of liver failure and infection. Examination was limited for me today as the patient was heavily sedated. Would recommend continuing to wean sedation as able as patient may require prolonged period to clear sedating medications. Per nurse, patient is moving all four extremities without focal deficit. Will obtain repeat CT head, but do not feel MRI brain is necessarily indicated at this time. We will continue tofollow.

## 2022-05-11 NOTE — PROGRESS NOTES
sodium chloride       PRN Meds:.potassium chloride **OR** potassium chloride, fentaNYL, magnesium sulfate, sodium chloride flush, sodium chloride, sodium phosphate IVPB **OR** sodium phosphate IVPB, sodium chloride flush, sodium chloride, albuterol sulfate HFA, sodium chloride flush, sodium chloride, nicotine polacrilex, acetaminophen **OR** acetaminophen, ondansetron, ALPRAZolam    Allergies   Allergen Reactions    Lisinopril Swelling     Tongue swelling      Zetia [Ezetimibe] Swelling     Facial swelling    Atorvastatin     Codeine Other (See Comments)     Blood pressure drops    Hydrochlorothiazide     Hydroxyzine      Fatigue and depressed    Lexapro [Escitalopram Oxalate]      Increased depression    Pravastatin      Social History     Socioeconomic History    Marital status:      Spouse name: Not on file    Number of children: Not on file    Years of education: Not on file    Highest education level: Not on file   Occupational History     Comment: Retired    Tobacco Use    Smoking status: Former Smoker     Packs/day: 1.00     Years: 14.00     Pack years: 14.00     Quit date:      Years since quittin.3    Smokeless tobacco: Never Used   Vaping Use    Vaping Use: Never used   Substance and Sexual Activity    Alcohol use: Not Currently    Drug use: Never    Sexual activity: Yes     Partners: Female   Other Topics Concern    Not on file   Social History Narrative    Not on file     Social Determinants of Health     Financial Resource Strain: Low Risk     Difficulty of Paying Living Expenses: Not hard at all   Food Insecurity: No Food Insecurity    Worried About Running Out of Food in the Last Year: Never true    Hector of Food in the Last Year: Never true   Transportation Needs:     Lack of Transportation (Medical): Not on file    Lack of Transportation (Non-Medical):  Not on file   Physical Activity:     Days of Exercise per Week: Not on file    Minutes of Exercise per Session: Not on file   Stress:     Feeling of Stress : Not on file   Social Connections:     Frequency of Communication with Friends and Family: Not on file    Frequency of Social Gatherings with Friends and Family: Not on file    Attends Confucianism Services: Not on file    Active Member of Clubs or Organizations: Not on file    Attends Club or Organization Meetings: Not on file    Marital Status: Not on file   Intimate Partner Violence:     Fear of Current or Ex-Partner: Not on file    Emotionally Abused: Not on file    Physically Abused: Not on file    Sexually Abused: Not on file   Housing Stability:     Unable to Pay for Housing in the Last Year: Not on file    Number of [place] in the Last Year: Not on file    Unstable Housing in the Last Year: Not on file      Family History   Problem Relation Age of Onset    Hypertension Brother     Diabetes Other          Physical Exam:       Vitals:    05/11/22 0845 05/11/22 0900 05/11/22 0915 05/11/22 0930   BP:  117/78  (!) 140/79   Pulse: 73 77 70 76   Resp: (!) 32 30 17 10   Temp:       TempSrc:       SpO2: 94% 93% 93% 92%   Weight:       Height:           Wt Readings from Last 3 Encounters:   05/04/22 285 lb (129.3 kg)   04/25/22 282 lb (127.9 kg)   03/21/22 298 lb 11.6 oz (135.5 kg)     Temp Readings from Last 3 Encounters:   05/11/22 99.9 °F (37.7 °C) (Rectal)   04/25/22 97.2 °F (36.2 °C) (Infrared)   03/21/22 98.3 °F (36.8 °C) (Oral)     BP Readings from Last 3 Encounters:   05/11/22 (!) 140/79   03/21/22 (!) 128/55   03/11/22 122/64     Pulse Readings from Last 3 Encounters:   05/11/22 76   04/25/22 67   03/21/22 75        Gen: Intubated on vent, following commands  HEENT: NC/AT, oculocephalics intact,  PERRL, mmm, neck supple, no meningeal signs; Heart: SB on monitor  Lungs: On vent  Ext: no edema,   Psych: Calm  Skin: no rashes or lesions    NEUROLOGIC EXAM:    Mental Status:  NAD, Patient is off sedation, following commands. Cranial Nerve Exam:   CN II-XII: PERRL, , no nystagmus, no gaze paresis,  Face appears symmetrical, tongue midline against ET tube; present cough reflex, present gag reflex, present corneal reflex    Motor Exam:       Strength  Antigravity x 4. follows commands   Tone and bulk normal   ROXANN pronator drift    Deep Tendon Reflexes: 2/4 biceps, triceps, brachioradialis, patellar, and achilles b/l; flexor plantar responses b/l    Sensation:Antigravity x 4. follows commands   Coordination/Cerebellum:       Tremors--none      Rapidly alternating movements: ROXANN            Heel-to-Shin: ROXANN      Finger-to-Nose: ROXANN  Gait and stance:      Gait: deferred      LABS:        CBC: No results for input(s): WBC, RBC, HGB, HCT, PLT, MCV in the last 72 hours. BMP:    Recent Labs     05/11/22  0515      K 3.5      CO2 27   BUN 28*   CREATININE 1.5*   GLUCOSE 146*   CALCIUM 8.5       IMAGING:    CTH         Impression   1. Motion compromised study but no acute intracranial abnormality.  I would   suggest follow-up if symptoms persist.   Repeat CTH. Impression   No acute intracranial abnormality.               Above imaging reviewed in detail. ASSESSMENT/PLAN:     78 y.o. -male with PMH of anxiety, alcoholic cirrhosis, GERD, sleep apnea, HLD, and HTN presenting to [facility] due to difficulty breathing. Patient was found to have pleural effusion. Neurology consulted for altered mental status. Patient is off sedation, following commands. 1. Altered mental status likely due to toxic metabolic encephalopathy in the setting of leukocytosis, hyponatremia, hypokalemia, liver failure,  and hypomagnesia. Patient also has abnormality  and is on meropenem for leukocytosis of unknown origin. We will repeat CT head.   Patient's mentation will likely  slowly improve upon metabolic improvements.   --CTH as above, repeat CT head ordered  -Patient discussed with attending physician Dr. Zakia Yousif Nothing further from neurology standpoint, we will sign off. If you have any further questions please do not hesitate to contact us. Thank you for your consultation      Thank you for allowing us to participate in the care of your patient. If there are any questions regarding evaluation please feel free to contact us.      MOHINDER Bunn - CNP, 5/11/2022    -----------------------

## 2022-05-11 NOTE — PROGRESS NOTES
CARDIOLOGY  NOTE        Name:  Sharla Shetty /Age/Sex: 1945  (78 y.o. male)   MRN & CSN:  [id number] & [id number] Admission Date/Time: 2022 12:00 PM   Location:  -A PCP: José Haider MD       Hospital Day: 10        PLAN FROM CARDIOLOGY FOR TODAY:   Continue present management    - cardiology consult is for: Possible CHF    -  Interval history: Remains on vent, appears to be alert possible extubation    · ASSESSMENT/ PLAN:  1. Recurrent pleural effusions per pulmonary might benefit from pleurodesis  2. Cirrhosis per GI  3. Echo with normal EF  4. Morbid obesity with a BMI of 43.34 suggestive of pickwickian syndrome          Subjective: Todays complain: Patient on vent    HPI:  Cherelle Foster is a 78 y. o.year old who and presents with had concerns including Shortness of Breath (reports difficulty breathing for the past 5-6 days and extremely tired). Chief Complaint   Patient presents with    Shortness of Breath     reports difficulty breathing for the past 5-6 days and extremely tired           Objective: Temperature:  Current - Temp: 99.9 °F (37.7 °C); Max - Temp  Av.5 °F (36.9 °C)  Min: 97.4 °F (36.3 °C)  Max: 99.9 °F (37.7 °C)    Respiratory Rate : Resp  Av.1  Min: 10  Max: 36    Pulse Range: Pulse  Av  Min: 54  Max: 100    Blood Presuure Range:  Systolic (51KEQ), NJQ:912 , Min:73 , DRX:853   ; Diastolic (89NWW), KCP:42, Min:46, Max:89      Pulse ox Range: SpO2  Av.4 %  Min: 87 %  Max: 100 %    24hr I & O:      Intake/Output Summary (Last 24 hours) at 2022 1020  Last data filed at 2022 0910  Gross per 24 hour   Intake 1567.22 ml   Output 1925 ml   Net -357.78 ml         BP (!) 140/79   Pulse 76   Temp 99.9 °F (37.7 °C) (Rectal)   Resp 10   Ht 5' 7.99\" (1.727 m)   Wt 285 lb (129.3 kg)   SpO2 92%   BMI 43.34 kg/m²           Review of Systems:     On vent    TELEMETRY: Sinus    has a past medical history of Anxiety, Cirrhosis, alcoholic ([address] GERD (gastroesophageal reflux disease), GERD (gastroesophageal reflux disease), Hyperlipidemia, Hypertension, Portal hypertension ([address] Pulmonary nodules, Sleep apnea, and SOB (shortness of breath). has a past surgical history that includes Cholecystectomy; Vasectomy; Colonoscopy; Endoscopy, colon, diagnostic; Foot surgery; and Upper gastrointestinal endoscopy (N/A, 1/19/2022). Physical Exam:  General: On vent  Head:normal  Eye: Pupils equal and round  Neck:  No JVD, no carotid bruit noted   Chest:  Clear to auscultation, no signs of respiratory distress  Cardiovascular:  Normal rate and rhythm. S1 and S2 noted.  No murmurs rubs or gallops  Abdomen:   nontender  Extremities:  +2 edema  Pulses; palpable  Neuro: on vent    Medications:    furosemide  40 mg IntraVENous BID    sodium chloride  1 g Oral TID    insulin regular  0-12 Units SubCUTAneous Q6H    enoxaparin  40 mg SubCUTAneous Daily    albuterol sulfate HFA  4 puff Inhalation Q4H    chlorhexidine  15 mL Mouth/Throat BID    famotidine (PEPCID) injection  20 mg IntraVENous BID    lidocaine PF  5 mL IntraDERmal Once    sodium chloride flush  5-40 mL IntraVENous 2 times per day    DULoxetine  60 mg Oral Daily    lactulose  20 g Oral TID    levothyroxine  50 mcg Oral Daily    magnesium oxide  200 mg Oral BID    [Held by provider] nadolol  40 mg Oral Daily    rifAXIMin  550 mg Oral BID    spironolactone  100 mg Oral Daily      propofol Stopped (05/10/22 0815)    dexmedetomidine HCl in NaCl Stopped (05/11/22 0745)    sodium chloride Stopped (05/06/22 1431)    norepinephrine 1 mcg/min (05/11/22 0943)    sodium chloride Stopped (05/10/22 0144)    sodium chloride       potassium chloride **OR** potassium chloride, fentaNYL, magnesium sulfate, sodium chloride flush, sodium chloride, sodium phosphate IVPB **OR** sodium phosphate IVPB, sodium chloride flush, sodium chloride, albuterol sulfate HFA, sodium chloride flush, sodium chloride, nicotine polacrilex, acetaminophen **OR** acetaminophen, ondansetron, ALPRAZolam    Lab Data:  CBC:   No results for input(s): WBC, HGB, HCT, MCV, PLT in the last 72 hours. BMP:   Recent Labs     05/09/22  0557 05/09/22  0557 05/09/22  1850 05/09/22  1850 05/10/22  0602 05/10/22  0602 05/10/22  1215 05/10/22  1805 05/11/22  0515   *   < > 136   < > 136  --   --  139 138   K 3.1*   < > 3.6   < > 3.4*   < > 4.2 4.4 3.5      < > 99   < > 100  --   --  102 101   CO2 22   < > 26   < > 26  --   --  26 27   PHOS 2.3*  --   --   --  2.3*  --   --   --  3.1   BUN 22   < > 23  --  25*  --   --   --  28*   CREATININE 1.2   < > 1.2  --  1.3  --   --   --  1.5*    < > = values in this interval not displayed. LIVER PROFILE:   No results for input(s): AST, ALT, LIPASE, BILIDIR, BILITOT, ALKPHOS in the last 72 hours. Invalid input(s): AMYLASE,  ALB  PT/INR:   Recent Labs     05/09/22  0557 05/10/22  0602 05/11/22  0515   PROTIME 15.8* 13.9 14.0   INR 1.22 1.08 1.08     APTT: No results for input(s): APTT in the last 72 hours. BNP:  No results for input(s): BNP in the last 72 hours. TROPONIN: No results for input(s): TROPONINI in the last 72 hours. No results for input(s): TROPONINT in the last 72 hours. Labs, consult, tests reviewed                    Ashish Marcano MD, PA-C 5/11/2022 10:20 AM     Please note this report has been partially produced using speech recognition software and may contain errors related to that system including errors in grammar, punctuation, and spelling, as well as words and phrases that may be inappropriate. If there are any questions or concerns please feel free to contact the dictating provider for clarification.

## 2022-05-11 NOTE — PROGRESS NOTES
Progress Note( Dr. Dalia Briones)  5/10/2022  Subjective:   Admit Date: 5/2/2022  PCP: Shante Jacob MD    Admitted For : Shortness of breath with right-sided pleural effusion    Consulted For:  Better control of blood glucose      Interval History: Patient is sedated, intubated and ventilator  Continue to be holding off tube feeding as he still has high residual  Started on Reglan bowel movements get better        Intake/Output Summary (Last 24 hours) at 5/10/2022 2139  Last data filed at 5/10/2022 2023  Gross per 24 hour   Intake 1790.15 ml   Output 2100 ml   Net -309.85 ml       DATA    CBC:   No results for input(s): WBC, HGB, PLT in the last 72 hours. CMP:  Recent Labs     05/09/22  0557 05/09/22  0557 05/09/22  1850 05/09/22  1850 05/10/22  0602 05/10/22  1215 05/10/22  1805   *   < > 136  --  136  --  139   K 3.1*   < > 3.6   < > 3.4* 4.2 4.4      < > 99  --  100  --  102   CO2 22   < > 26  --  26  --  26   BUN 22  --  23  --  25*  --   --    CREATININE 1.2  --  1.2  --  1.3  --   --    CALCIUM 7.8*  --  8.3  --  8.9  --   --     < > = values in this interval not displayed. Lipids:   Lab Results   Component Value Date    CHOL 174 05/07/2022    CHOL 230 10/14/2019    HDL 28 05/07/2022    TRIG 130 05/07/2022     Glucose:  Recent Labs     05/10/22  0600 05/10/22  1209 05/10/22  1801   POCGLU 125* 101* 105*     LeioslavfzD6T:  Lab Results   Component Value Date    LABA1C 6.1 08/09/2021     High Sensitivity TSH:   Lab Results   Component Value Date    TSHHS 0.900 05/05/2022     Free T3: No results found for: FT3  Free T4:  Lab Results   Component Value Date    T4FREE 0.78 03/29/2022       CT HEAD WO CONTRAST   Final Result   No acute intracranial abnormality. US CHEST INCLUDING MEDIASTINUM   Final Result   Right pleural effusion.          XR CHEST PORTABLE   Final Result   Increased hazy opacification of the right lung, favored to represent a   combination of progressed atelectasis and layering pleural fluid. Progressive underline airspace disease such as pneumonia is not excluded. Endotracheal tube appears retracted, now measuring about 7.3 cm above the   berny. This may be related in part to patient position. XR CHEST PORTABLE   Final Result   No interval change of right-sided airspace opacity and probable bilateral   pleural effusions. Endotracheal tube tip is 3.3 cm above the berny. XR ABDOMEN (KUB) (SINGLE AP VIEW)   Final Result   No radiopaque urinary collecting system calculus evident. Unremarkable bowel gas pattern. Unremarkable fecal burden. XR CHEST PORTABLE   Final Result   Diffuse opacities are seen within the right lung without significant change. Tip of the ET tube is 5.9 cm above the berny. XR CHEST PORTABLE   Final Result   Bilateral pleural effusions and airspace opacities. Endotracheal tube tip is approximately 5 cm above the berny. XR CHEST PORTABLE   Final Result   Improved aeration of the right lung field compared to 05/04/2022 with   persistent large areas of increased opacification right apical and right   mid-basilar regions consistent with underlying pulmonary consolidation,   mass/neoplasm, residual pleural fluid or thickening or asymmetric elevation   of left hemidiaphragm. No pneumothorax or procedure related complication   suggested      Consolidating infiltrate and/or moderate-sized pleural effusion left basilar   region. Stable borderline cardiomegaly, satisfactory position of nasogastric and   endotracheal tubes, soft tissue density right paratracheal region consistent   with mass/neoplasm, lymphadenopathy or area pulmonary consolidation measuring   5.5 cm in greatest cephalocaudal dimension. US CHEST INCLUDING MEDIASTINUM   Final Result   Ultrasound guidance provided for right thoracentesis.          XR CHEST PORTABLE   Final Result   Near complete opacification of the right hemithorax with mildly improved   aeration. Mildly increased left-sided interstitial opacities favoring interstitial   edema. Unchanged position of support devices. CT ABDOMEN PELVIS WO CONTRAST Additional Contrast? None   Final Result   Complete opacification of the right hemithorax with a large right pleural   effusion, with compressive atelectasis of the right lung, and shift of the   mediastinum to the left. Minor atelectasis noted in the left lung base. Large amount of ascites. Evidence of the cirrhosis. Stable left renal cyst.      Stable mesenteric and retroperitoneal lymphadenopathy without change since   2019. XR ABDOMEN FOR NG/OG/NE TUBE PLACEMENT   Final Result   Satisfactory position lines and catheters. Right sided chest opacification. Question extrinsic to the patient         [address]   Final Result   Large right pleural effusion. CT HEAD WO CONTRAST   Final Result   1. Motion compromised study but no acute intracranial abnormality. I would   suggest follow-up if symptoms persist.         IR US GUIDED PARACENTESIS   Final Result   Successful ultrasound-guided paracentesis with specimen sent for laboratory   evaluation. US DUP ABD PEL RETRO SCROT COMPLETE   Final Result   1. Portal and hepatic veins appear grossly patent. 2. Cirrhosis without focal liver lesion demonstrated. 3. Small volume ascites. XR CHEST PORTABLE   Final Result   Significant progression of opacification of the right hemithorax, which is   now complete. This likely represent progressed pleural effusion and   atelectasis. Likely trace left pleural effusion, similar to prior         XR CHEST PORTABLE   Final Result   1. Status post right thoracentesis with new partial aeration of the right   lung. Large pleural effusion persists. 2. Small left pleural effusion.          IR GUIDED THORACENTESIS PLEURAL   Final Result   Successful ultrasound guided right thoracentesis with specimen sent for   laboratory evaluation. .         XR CHEST PORTABLE   Final Result   Complete opacification of the right hemithorax likely due to a large right   pleural effusion and atelectasis.          CTA CHEST ABDOMEN PELVIS W CONTRAST    (Results Pending)   XR CHEST PORTABLE    (Results Pending)        Scheduled Medicines   Medications:    sodium chloride  1 g Oral TID    insulin regular  0-12 Units SubCUTAneous Q6H    enoxaparin  40 mg SubCUTAneous Daily    albuterol sulfate HFA  4 puff Inhalation Q4H    chlorhexidine  15 mL Mouth/Throat BID    famotidine (PEPCID) injection  20 mg IntraVENous BID    lidocaine PF  5 mL IntraDERmal Once    sodium chloride flush  5-40 mL IntraVENous 2 times per day    DULoxetine  60 mg Oral Daily    lactulose  20 g Oral TID    levothyroxine  50 mcg Oral Daily    magnesium oxide  200 mg Oral BID    [Held by provider] nadolol  40 mg Oral Daily    rifAXIMin  550 mg Oral BID    spironolactone  100 mg Oral Daily      Infusions:    propofol Stopped (05/10/22 0815)    dexmedetomidine HCl in NaCl 0.8 mcg/kg/hr (05/10/22 2000)    furosemide (LASIX) 1mg/ml infusion 5 mg/hr (05/10/22 2000)    sodium chloride Stopped (05/06/22 1431)    norepinephrine 3 mcg/min (05/10/22 2000)    sodium chloride Stopped (05/10/22 0144)    sodium chloride           Objective:   Vitals: BP (!) 112/54   Pulse 69   Temp 99.1 °F (37.3 °C) (Oral)   Resp 20   Ht 5' 7.99\" (1.727 m)   Wt 285 lb (129.3 kg)   SpO2 99%   BMI 43.34 kg/m²   General appearance: Patient is sedated, intubated and on ventilator also NG tube feeding  Neck: no JVD or bruit  Thyroid : Normal lobes   Lungs: Has Vesicular Breath sounds related diminished breath sound right-sided pleural effusion was aspirated earlier but it and again reaccumulated  Heart:  regular rate and rhythm  Abdomen: soft, non-tender; bowel sounds normal; no masses,  no organomegaly  Musculoskeletal: Normal  Extremities: extremities normal, , no edema  Neurologic:  Patient is sedated, intubated and on ventilator . Assessment:     Patient Active Problem List:     Shortness of breath     History of colonic polyps     Alcoholic cirrhosis of liver with ascites (HCC)-Dr Jacques     Mixed hyperlipidemia     Hypertension     History of alcohol abuse     Gastroesophageal reflux disease without esophagitis     Anxiety     Pulmonary nodules     Hyperglycemia     Acquired hypothyroidism     Class 3 severe obesity with body mass index (BMI) of 40.0 to 44.9 in adult [facility])     Bilateral hearing loss     Increased ammonia level     JENNIFER (obstructive sleep apnea)     Portal hypertension (HCC)     SOB (shortness of breath)     Cirrhosis of liver with ascites (HCC)     Secondary esophageal varices without bleeding (HCC)     Gastric polyps     Hearing loss     Sepsis (HCC)     Hepatic encephalopathy (HCC)     Pleural effusion     Acute on chronic respiratory failure with hypoxemia (HCC)     Acute respiratory failure ([facility] [address]      Plan:     1. Reviewed POC blood glucose . Labs and X ray results   2. Reviewed Current Medicines   3. On correction bolus of regular insulin  4. Monitor Blood glucose frequently   5. Modified  the dose of Insulin/ other medicines as needed  6. Tube feeding is on hold  7. If unable to tolerate tube feeding should consider starting him on TPN  8. Will follow     .      Mark Peres MD, MD

## 2022-05-11 NOTE — RT PROTOCOL NOTE
RT Inhaler-Nebulizer Bronchodilator Protocol Note    There is a bronchodilator order in the chart from a provider indicating to follow the RT Bronchodilator Protocol and there is an Initiate RT Inhaler-Nebulizer Bronchodilator Protocol order as well (see protocol at bottom of note). CXR Findings:  XR CHEST PORTABLE    Result Date: 5/11/2022  Complete opacification of right hemithorax which appears slightly progressed in comparison to 05/10/2022, with decreased aeration at the right lung apex. As previously, this may represent a combination of layering pleural fluid, atelectasis, and/or airspace disease Similar small left pleural effusion. No significant interval change in tube and line position. XR CHEST PORTABLE    Result Date: 5/11/2022  Diffuse opacities are seen within the right lung. Findings could be related to layering of a right pleural effusion and adjacent atelectasis/infiltrate. This is not significantly changed. Pulmonary vascular congestion. Tip of the ET tube is 2.5 cm above the berny. XR CHEST PORTABLE    Result Date: 5/10/2022  Increased hazy opacification of the right lung, favored to represent a combination of progressed atelectasis and layering pleural fluid. Progressive underline airspace disease such as pneumonia is not excluded. Endotracheal tube appears retracted, now measuring about 7.3 cm above the berny. This may be related in part to patient position. The findings from the last RT Protocol Assessment were as follows:   History Pulmonary Disease: Chronic pulmonary disease  Respiratory Pattern: Dyspnea on exertion or RR 21-25 bpm  Breath Sounds: Slightly diminished and/or crackles  Cough: Strong, spontaneous, non-productive  Indication for Bronchodilator Therapy:  On home bronchodilators (albuterol PRN only)  Bronchodilator Assessment Score: 6    Aerosolized bronchodilator medication orders have been revised according to the RT Inhaler-Nebulizer Bronchodilator Protocol below.    Respiratory Therapist to perform RT Therapy Protocol Assessment initially then follow the protocol. Repeat RT Therapy Protocol Assessment PRN for score 0-3 or on second treatment, BID, and PRN for scores above 3. No Indications - adjust the frequency to every 6 hours PRN wheezing or bronchospasm, if no treatments needed after 48 hours then discontinue using Per Protocol order mode. If indication present, adjust the RT bronchodilator orders based on the Bronchodilator Assessment Score as indicated below. Use Inhaler orders unless patient has one or more of the following: on home nebulizer, not able to hold breath for 10 seconds, is not alert and oriented, cannot activate and use MDI correctly, or respiratory rate 25 breaths per minute or more, then use the equivalent nebulizer order(s) with same Frequency and PRN reasons based on the score. If a patient is on this medication at home then do not decrease Frequency below that used at home. 0-3 - enter or revise RT bronchodilator order(s) to equivalent RT Bronchodilator order with Frequency of every 4 hours PRN for wheezing or increased work of breathing using Per Protocol order mode. 4-6 - enter or revise RT Bronchodilator order(s) to two equivalent RT bronchodilator orders with one order with BID Frequency and one order with Frequency of every 4 hours PRN wheezing or increased work of breathing using Per Protocol order mode. 7-10 - enter or revise RT Bronchodilator order(s) to two equivalent RT bronchodilator orders with one order with TID Frequency and one order with Frequency of every 4 hours PRN wheezing or increased work of breathing using Per Protocol order mode. 11-13 - enter or revise RT Bronchodilator order(s) to one equivalent RT bronchodilator order with QID Frequency and an Albuterol order with Frequency of every 4 hours PRN wheezing or increased work of breathing using Per Protocol order mode.       Greater than 13 - enter or revise RT Bronchodilator order(s) to one equivalent RT bronchodilator order with every 4 hours Frequency and an Albuterol order with Frequency of every 2 hours PRN wheezing or increased work of breathing using Per Protocol order mode. RT to enter RT Home Evaluation for COPD & MDI Assessment order using Per Protocol order mode.     Electronically signed by Dave Ruiz RCP on 5/11/2022 at 12:06 PM

## 2022-05-11 NOTE — PROGRESS NOTES
Hospitalist Progress Note      Name:  Charity Landau /Age/Sex: 1945  (78 y.o. male)   MRN & CSN:  [id number] & [id number] Admission Date/Time: 2022 12:00 PM   Location:  -A PCP: Steve Ma, [address] Day: 10    Assessment and Plan:   Charity Landau is a 78 y.o.  male  who presents with Pleural effusion    1) Acute Hypoxic Respiratory Failure  -Likely due to large Rt sided pleural effusion as well as Rhinovirus  -Mechanical intubation on 2022  -S/P Rt thoracentesis with removal of 2L of fluid 22  -Per Pulm, fluid seems to be due to ascites  -Wean down FiO2 as tolerated  -Pulm on board for vent management    2) Decompensated Alcoholic liver cirrhosis with ascites  -S/P Paracentesis with drainage of 6.9L on , analysis consistent with portal HTN due to liver cirrhosis. Neg for SBP.  -Continue lasix and Aldactone  -GI on board, might need TIPS procedure if Rt sided pleural effusion continues and due to hepatic hydothorax  -Continue to monitor    3) Acute Hepatic Encephalopathy  -Continue Lactulose and Rifaximin  -Aim to achieve 2-3 BM daily  -Mentation improving    4) Hypotension  -Likely due to third spacing  -Wean off pressor as tolerated    Other chronic medical conditions: Medication resumed unless contraindicated    CKD stage III  History of hypothyroidism  Morbid obesity      Diet ADULT TUBE FEEDING; Orogastric; Peptide Based High Protein; Continuous; 10; Yes; 10; Q 6 hours; 70; 30; Q 4 hours   DVT Prophylaxis [] Lovenox, []  Heparin, [] SCDs, [] Ambulation   GI Prophylaxis [] PPI,  [] H2 Blocker,  [] Carafate,  [] Diet/Tube Feeds   Code Status Full Code   Disposition TBD   MDM      History of Present Illness:     Patient was seen and examined  On mechanical ventilation on sedation  RASS of -3, followed commands      Objective:        Intake/Output Summary (Last 24 hours) at 2022 1027  Last data filed at 5/11/2022 0910  Gross per 24 hour   Intake 1567.22 ml   Output 1925 ml   Net -357.78 ml      Vitals:   Vitals:    05/11/22 0930   BP: (!) 140/79   Pulse: 76   Resp: 10   Temp:    SpO2: 92%     Physical Exam:   GEN on mechanical ventilation on sedation  EYES Pupils are equally round. No scleral erythema, discharge, or conjunctivitis. HENT Mucous membranes are moist. Oral pharynx without exudates, no evidence of thrush. NECK Supple, no apparent thyromegaly or masses. RESP Clear to auscultation, no wheezes, rales or rhonchi. Symmetric chest movement while on mechanical ventilation  CARDIO/VASC S1/S2 auscultated. Regular rate without appreciable murmurs, rubs, or gallops. No JVD or carotid bruits. Peripheral pulses equal bilaterally and palpable. No peripheral edema. GI Abdomen is soft without significant tenderness, masses, or guarding. Bowel sounds are normoactive. Rectal exam deferred.  No costovertebral angle tenderness. Schilling catheter is present. HEME/LYMPH No palpable cervical lymphadenopathy and no hepatosplenomegaly. No petechiae or ecchymoses. MSK No gross joint deformities. SKIN Normal coloration, warm, dry.   NEURO on mechanical ventilation and sedation, RASS of -3    Medications:   Medications:    furosemide  40 mg IntraVENous BID    sodium chloride  1 g Oral TID    insulin regular  0-12 Units SubCUTAneous Q6H    enoxaparin  40 mg SubCUTAneous Daily    albuterol sulfate HFA  4 puff Inhalation Q4H    chlorhexidine  15 mL Mouth/Throat BID    famotidine (PEPCID) injection  20 mg IntraVENous BID    lidocaine PF  5 mL IntraDERmal Once    sodium chloride flush  5-40 mL IntraVENous 2 times per day    DULoxetine  60 mg Oral Daily    lactulose  20 g Oral TID    levothyroxine  50 mcg Oral Daily    magnesium oxide  200 mg Oral BID    [Held by provider] nadolol  40 mg Oral Daily    rifAXIMin  550 mg Oral BID    spironolactone  100 mg Oral Daily      Infusions:    propofol Stopped (05/10/22 2679)    dexmedetomidine HCl in NaCl Stopped (05/11/22 0745)    sodium chloride Stopped (05/06/22 1431)    norepinephrine 1 mcg/min (05/11/22 0943)    sodium chloride Stopped (05/10/22 0144)    sodium chloride       PRN Meds: potassium chloride, 20 mEq, PRN   Or  potassium chloride, 10 mEq, PRN  fentaNYL, 25 mcg, Q2H PRN  magnesium sulfate, 1,000 mg, PRN  sodium chloride flush, 5-40 mL, PRN  sodium chloride, , PRN  sodium phosphate IVPB, 15 mmol, PRN   Or  sodium phosphate IVPB, 15 mmol, PRN  sodium chloride flush, 5-40 mL, PRN  sodium chloride, , PRN  albuterol sulfate HFA, 2 puff, Q4H PRN  sodium chloride flush, 10 mL, PRN  sodium chloride, , PRN  nicotine polacrilex, 2 mg, Q1H PRN  acetaminophen, 650 mg, Q6H PRN   Or  acetaminophen, 650 mg, Q6H PRN  ondansetron, 4 mg, Q6H PRN  ALPRAZolam, 0.5 mg, Daily PRN          Electronically signed by Krystle Gaviria MD on 5/11/2022 at 10:27 AM

## 2022-05-11 NOTE — PROGRESS NOTES
Pulmonary and Critical Care  Progress Note      VITALS:  BP (!) 140/79   Pulse 76   Temp 99.9 °F (37.7 °C) (Rectal)   Resp 10   Ht 5' 7.99\" (1.727 m)   Wt 285 lb (129.3 kg)   SpO2 92%   BMI 43.34 kg/m²     Subjective:   CHIEF COMPLAINT :SOB     HPI:                The patient is lying in the bed. He is not in acute resp distress. He has been extubated and doing well. Objective:   PHYSICAL EXAM:    LUNGS:decreased air entry right base  Abd-distended, BS+,NT  Ext- no pedal edema  CVS-s1s2, no murmurs      DATA:    CBC:  No results for input(s): WBC, RBC, HGB, HCT, PLT, MCV, MCH, MCHC, RDW, NRBC, SEGSPCT, BANDSPCT in the last 72 hours. BMP:  Recent Labs     05/09/22  1850 05/09/22  1850 05/10/22  0602 05/10/22  0602 05/10/22  1215 05/10/22  1805 05/11/22  0515      < > 136  --   --  139 138   K 3.6   < > 3.4*   < > 4.2 4.4 3.5   CL 99   < > 100  --   --  102 101   CO2 26   < > 26  --   --  26 27   BUN 23  --  25*  --   --   --  28*   CREATININE 1.2  --  1.3  --   --   --  1.5*   CALCIUM 8.3  --  8.9  --   --   --  8.5   GLUCOSE 135*  --  115*  --   --   --  146*    < > = values in this interval not displayed. ABG:  Recent Labs     05/10/22  0600 05/11/22  0600   PH 7.60* 7.50*   PO2ART 115* 66*   [id number] 29.0* 35.0   O2SAT 96.4 91.4*     BNP  No results found for: BNP   D-Dimer:  Lab Results   Component Value Date    DDIMER 1606 (H) 09/13/2021      Radiology:   Complete opacification of right hemithorax which appears slightly progressed   in comparison to 05/10/2022, with decreased aeration at the right lung apex. As previously, this may represent a combination of layering pleural fluid,   atelectasis, and/or airspace disease       Similar small left pleural effusion.       No significant interval change in tube and line position     1.        Assessment/Plan     Patient Active Problem List    Diagnosis Date Noted    Acute respiratory failure ([facility] [address] 05/04/2022     Priority: Medium    Acute on chronic respiratory failure with hypoxemia (HCC)      Priority: Medium    Pleural effusion 05/02/2022     Priority: Medium    Sepsis ([facility] [address] 03/19/2022    Hepatic encephalopathy ([facility] [address] 03/19/2022    Hearing loss 03/11/2022    Cirrhosis of liver with ascites ([facility] [address]     Secondary esophageal varices without bleeding (HCC)     Gastric polyps     SOB (shortness of breath) 09/13/2021    Portal hypertension ([facility] [address] 08/30/2021    JENNIFER (obstructive sleep apnea) 08/09/2021    Bilateral hearing loss 05/07/2021    Increased ammonia level 05/07/2021    Class 3 severe obesity with body mass index (BMI) of 40.0 to 44.9 in adult [facility]) 09/21/2020    Hyperglycemia 12/04/2019    Acquired hypothyroidism 12/04/2019    Pulmonary nodules 92/57/4309    Alcoholic cirrhosis of liver with ascites (HCC)-Dr Jacques 08/08/2019    Mixed hyperlipidemia 08/08/2019    Hypertension 08/08/2019    History of alcohol abuse 08/08/2019    Gastroesophageal reflux disease without esophagitis 08/08/2019    Anxiety 08/08/2019    Shortness of breath 05/26/2019    History of colonic polyps 01/15/2019   Acute Hypoxic resp failure  Recurrent Right pleural effusion 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 JENNIFER  VDRF  Toxic Metabolic encephalopathy  Moderate Malnutrition       1. ICS  2. OOB  3. PT/OT  4. Start with clears and advance as tolerated  5. Right thoracentesis at bedside tomorrow at noon  6. Diuresis  7. Inhalers  8. Keep sats > 92%  9.  C.w present management    Electronically signed by Los Boateng MD on 5/11/2022 at 11:32 AM

## 2022-05-11 NOTE — PLAN OF CARE
Problem: Discharge Planning  Goal: Discharge to home or other facility with appropriate resources  Outcome: Progressing     Problem: Pain  Goal: Verbalizes/displays adequate comfort level or baseline comfort level  Outcome: Progressing     Problem: Safety - Medical Restraint  Goal: Remains free of injury from restraints (Restraint for Interference with Medical Device)  Description: INTERVENTIONS:  1. Determine that other, less restrictive measures have been tried or would not be effective before applying the restraint  2. Evaluate the patient's condition at the time of restraint application  3. Inform patient/family regarding the reason for restraint  4.  Q2H: Monitor safety, psychosocial status, comfort, nutrition and hydration  Outcome: Progressing  Flowsheets  Taken 5/11/2022 1000 by Shaun Sheridan RN  Remains free of injury from restraints (restraint for interference with medical device): Every 2 hours: Monitor safety, psychosocial status, comfort, nutrition and hydration  Taken 5/11/2022 0900 by Shaun Sheridan RN  Remains free of injury from restraints (restraint for interference with medical device): Every 2 hours: Monitor safety, psychosocial status, comfort, nutrition and hydration  Taken 5/11/2022 0800 by Shaun Sheridan RN  Remains free of injury from restraints (restraint for interference with medical device): Every 2 hours: Monitor safety, psychosocial status, comfort, nutrition and hydration  Taken 5/11/2022 0700 by Jelly Kraus RN  Remains free of injury from restraints (restraint for interference with medical device): Every 2 hours: Monitor safety, psychosocial status, comfort, nutrition and hydration  Taken 5/11/2022 0600 by Jelly Kraus RN  Remains free of injury from restraints (restraint for interference with medical device): Every 2 hours: Monitor safety, psychosocial status, comfort, nutrition and hydration  Taken 5/11/2022 0500 by Jelly Kraus RN  Remains free of injury from restraints (restraint for interference with medical device): Every 2 hours: Monitor safety, psychosocial status, comfort, nutrition and hydration  Taken 5/11/2022 0400 by Ronaldo Macedo RN  Remains free of injury from restraints (restraint for interference with medical device): Every 2 hours: Monitor safety, psychosocial status, comfort, nutrition and hydration  Taken 5/11/2022 0300 by Ronaldo Macedo RN  Remains free of injury from restraints (restraint for interference with medical device): Every 2 hours: Monitor safety, psychosocial status, comfort, nutrition and hydration  Taken 5/11/2022 0200 by Ronaldo Macedo RN  Remains free of injury from restraints (restraint for interference with medical device): Every 2 hours: Monitor safety, psychosocial status, comfort, nutrition and hydration  Taken 5/11/2022 0100 by oRnaldo Macedo RN  Remains free of injury from restraints (restraint for interference with medical device): Every 2 hours: Monitor safety, psychosocial status, comfort, nutrition and hydration  Taken 5/11/2022 0000 by Ronaldo Macedo RN  Remains free of injury from restraints (restraint for interference with medical device): Every 2 hours: Monitor safety, psychosocial status, comfort, nutrition and hydration     Problem: Nutrition Deficit:  Goal: Optimize nutritional status  Outcome: Progressing     Problem: Safety - Adult  Goal: Free from fall injury  Outcome: Progressing     Problem: ABCDS Injury Assessment  Goal: Absence of physical injury  Outcome: Progressing     Problem: Skin/Tissue Integrity  Goal: Absence of new skin breakdown  Description: 1. Monitor for areas of redness and/or skin breakdown  2. Assess vascular access sites hourly  3. Every 4-6 hours minimum:  Change oxygen saturation probe site  4. Every 4-6 hours:  If on nasal continuous positive airway pressure, respiratory therapy assess nares and determine need for appliance change or resting period.   Outcome: Progressing

## 2022-05-11 NOTE — PLAN OF CARE
Problem: Discharge Planning  Goal: Discharge to home or other facility with appropriate resources  5/11/2022 1356 by Ayesha Sanches RN  Outcome: Progressing  5/11/2022 1356 by Ayesha Sanches RN  Outcome: Progressing     Problem: Pain  Goal: Verbalizes/displays adequate comfort level or baseline comfort level  5/11/2022 1356 by Ayesha Sanches RN  Outcome: Progressing  5/11/2022 1356 by Ayesha Sanches RN  Outcome: Progressing     Problem: Safety - Medical Restraint  Goal: Remains free of injury from restraints (Restraint for Interference with Medical Device)  Description: INTERVENTIONS:  1. Determine that other, less restrictive measures have been tried or would not be effective before applying the restraint  2. Evaluate the patient's condition at the time of restraint application  3. Inform patient/family regarding the reason for restraint  4.  Q2H: Monitor safety, psychosocial status, comfort, nutrition and hydration  5/11/2022 1356 by Ayesha Sanches RN  Outcome: Progressing  5/11/2022 1356 by Ayesha Sanches RN  Outcome: Progressing  Flowsheets  Taken 5/11/2022 1000 by Ayesha Sanches RN  Remains free of injury from restraints (restraint for interference with medical device): Every 2 hours: Monitor safety, psychosocial status, comfort, nutrition and hydration  Taken 5/11/2022 0900 by Ayesha Sanches RN  Remains free of injury from restraints (restraint for interference with medical device): Every 2 hours: Monitor safety, psychosocial status, comfort, nutrition and hydration  Taken 5/11/2022 0800 by Ayesha Sanches RN  Remains free of injury from restraints (restraint for interference with medical device): Every 2 hours: Monitor safety, psychosocial status, comfort, nutrition and hydration  Taken 5/11/2022 0700 by Khadijah House RN  Remains free of injury from restraints (restraint for interference with medical device): Every 2 hours: Monitor safety, psychosocial status, comfort, nutrition and hydration  Taken 5/11/2022 0600 by Damir Reich RN  Remains free of injury from restraints (restraint for interference with medical device): Every 2 hours: Monitor safety, psychosocial status, comfort, nutrition and hydration  Taken 5/11/2022 0500 by Damir Reich RN  Remains free of injury from restraints (restraint for interference with medical device): Every 2 hours: Monitor safety, psychosocial status, comfort, nutrition and hydration  Taken 5/11/2022 0400 by Damir Reich RN  Remains free of injury from restraints (restraint for interference with medical device): Every 2 hours: Monitor safety, psychosocial status, comfort, nutrition and hydration  Taken 5/11/2022 0300 by Damir Reich RN  Remains free of injury from restraints (restraint for interference with medical device): Every 2 hours: Monitor safety, psychosocial status, comfort, nutrition and hydration  Taken 5/11/2022 0200 by Damir Reich RN  Remains free of injury from restraints (restraint for interference with medical device): Every 2 hours: Monitor safety, psychosocial status, comfort, nutrition and hydration  Taken 5/11/2022 0100 by Damir Reich RN  Remains free of injury from restraints (restraint for interference with medical device): Every 2 hours: Monitor safety, psychosocial status, comfort, nutrition and hydration  Taken 5/11/2022 0000 by Damir Reich RN  Remains free of injury from restraints (restraint for interference with medical device): Every 2 hours: Monitor safety, psychosocial status, comfort, nutrition and hydration     Problem: Nutrition Deficit:  Goal: Optimize nutritional status  5/11/2022 1356 by Tony Pressley RN  Outcome: Progressing  5/11/2022 1356 by Tony Pressley RN  Outcome: Progressing     Problem: Safety - Adult  Goal: Free from fall injury  5/11/2022 1356 by Tony Pressley RN  Outcome: Progressing  5/11/2022 1356 by Tony Pressley RN  Outcome: Progressing     Problem: ABCDS Injury Assessment  Goal: Absence of physical injury  5/11/2022 1356 by Johnny Serrano RN  Outcome: Progressing  5/11/2022 1356 by Johnny Serrano RN  Outcome: Progressing     Problem: Skin/Tissue Integrity  Goal: Absence of new skin breakdown  Description: 1. Monitor for areas of redness and/or skin breakdown  2. Assess vascular access sites hourly  3. Every 4-6 hours minimum:  Change oxygen saturation probe site  4. Every 4-6 hours:  If on nasal continuous positive airway pressure, respiratory therapy assess nares and determine need for appliance change or resting period.   5/11/2022 1356 by Johnny Serrano RN  Outcome: Progressing  5/11/2022 1356 by Johnny Serrano RN  Outcome: Progressing

## 2022-05-12 ENCOUNTER — APPOINTMENT (OUTPATIENT)
Dept: GENERAL RADIOLOGY | Age: 77
DRG: 432 | End: 2022-05-12
Payer: MEDICARE

## 2022-05-12 ENCOUNTER — APPOINTMENT (OUTPATIENT)
Dept: ULTRASOUND IMAGING | Age: 77
DRG: 432 | End: 2022-05-12
Payer: MEDICARE

## 2022-05-12 LAB
AMMONIA: 32 UMOL/L (ref 16–60)
ANION GAP SERPL CALCULATED.3IONS-SCNC: 14 MMOL/L (ref 4–16)
BASOPHILS ABSOLUTE: 0.1 K/CU MM
BASOPHILS RELATIVE PERCENT: 1 % (ref 0–1)
BUN BLDV-MCNC: 33 MG/DL (ref 6–23)
CALCIUM SERPL-MCNC: 9.2 MG/DL (ref 8.3–10.6)
CHLORIDE BLD-SCNC: 104 MMOL/L (ref 99–110)
CO2: 26 MMOL/L (ref 21–32)
CREAT SERPL-MCNC: 1.7 MG/DL (ref 0.9–1.3)
CULTURE: NORMAL
CULTURE: NORMAL
DIFFERENTIAL TYPE: ABNORMAL
EOSINOPHILS ABSOLUTE: 0.8 K/CU MM
EOSINOPHILS RELATIVE PERCENT: 6.1 % (ref 0–3)
FLUID TYPE: NORMAL INDEX
GFR AFRICAN AMERICAN: 48 ML/MIN/1.73M2
GFR NON-AFRICAN AMERICAN: 40 ML/MIN/1.73M2
GLUCOSE BLD-MCNC: 101 MG/DL (ref 70–99)
GLUCOSE BLD-MCNC: 105 MG/DL (ref 70–99)
GLUCOSE BLD-MCNC: 129 MG/DL (ref 70–99)
GLUCOSE BLD-MCNC: 88 MG/DL (ref 70–99)
GLUCOSE BLD-MCNC: 92 MG/DL (ref 70–99)
GLUCOSE BLD-MCNC: 93 MG/DL (ref 70–99)
GLUCOSE, FLUID: 111 MG/DL
GRAM SMEAR: NORMAL
HCT VFR BLD CALC: 41.2 % (ref 42–52)
HEMOGLOBIN: 13.3 GM/DL (ref 13.5–18)
HIGH SENSITIVE C-REACTIVE PROTEIN: 197 MG/L
IMMATURE NEUTROPHIL %: 0.6 % (ref 0–0.43)
INR BLD: 1.12 INDEX
LACTATE DEHYDROGENASE, FLUID: 104 IU/L
LYMPHOCYTES ABSOLUTE: 1.1 K/CU MM
LYMPHOCYTES RELATIVE PERCENT: 8.6 % (ref 24–44)
LYMPHOCYTES, BODY FLUID: 21 %
Lab: NORMAL
Lab: NORMAL
MAGNESIUM: 2.2 MG/DL (ref 1.8–2.4)
MCH RBC QN AUTO: 32.3 PG (ref 27–31)
MCHC RBC AUTO-ENTMCNC: 32.3 % (ref 32–36)
MCV RBC AUTO: 100 FL (ref 78–100)
MESOTHELIAL FLUID: 4 /100 WBC
MONOCYTE, FLUID: 72 %
MONOCYTES ABSOLUTE: 1.8 K/CU MM
MONOCYTES RELATIVE PERCENT: 14.4 % (ref 0–4)
NEUTROPHIL, FLUID: 7 %
NUCLEATED RBC %: 0 %
PDW BLD-RTO: 15.1 % (ref 11.7–14.9)
PH FLUID: 8
PHOSPHORUS: 4.1 MG/DL (ref 2.5–4.9)
PLATELET # BLD: 208 K/CU MM (ref 140–440)
PMV BLD AUTO: 11.3 FL (ref 7.5–11.1)
POTASSIUM SERPL-SCNC: 4.4 MMOL/L (ref 3.5–5.1)
PROCALCITONIN: 0.61
PROTEIN FLUID: 3 G/DL
PROTHROMBIN TIME: 14.4 SECONDS (ref 11.7–14.5)
RBC # BLD: 4.12 M/CU MM (ref 4.6–6.2)
RBC FLUID: NORMAL /CU MM
SEGMENTED NEUTROPHILS ABSOLUTE COUNT: 8.7 K/CU MM
SEGMENTED NEUTROPHILS RELATIVE PERCENT: 69.3 % (ref 36–66)
SODIUM BLD-SCNC: 144 MMOL/L (ref 135–145)
SPECIMEN: NORMAL
SPECIMEN: NORMAL
TOTAL IMMATURE NEUTOROPHIL: 0.07 K/CU MM
TOTAL NUCLEATED RBC: 0 K/CU MM
WBC # BLD: 12.5 K/CU MM (ref 4–10.5)
WBC FLUID: 591 /CU MM

## 2022-05-12 PROCEDURE — 6370000000 HC RX 637 (ALT 250 FOR IP): Performed by: NURSE PRACTITIONER

## 2022-05-12 PROCEDURE — 97530 THERAPEUTIC ACTIVITIES: CPT

## 2022-05-12 PROCEDURE — 87070 CULTURE OTHR SPECIMN AEROBIC: CPT

## 2022-05-12 PROCEDURE — 97162 PT EVAL MOD COMPLEX 30 MIN: CPT

## 2022-05-12 PROCEDURE — 85025 COMPLETE CBC W/AUTO DIFF WBC: CPT

## 2022-05-12 PROCEDURE — 6370000000 HC RX 637 (ALT 250 FOR IP): Performed by: INTERNAL MEDICINE

## 2022-05-12 PROCEDURE — 2580000003 HC RX 258: Performed by: INTERNAL MEDICINE

## 2022-05-12 PROCEDURE — 83735 ASSAY OF MAGNESIUM: CPT

## 2022-05-12 PROCEDURE — 80048 BASIC METABOLIC PNL TOTAL CA: CPT

## 2022-05-12 PROCEDURE — P9047 ALBUMIN (HUMAN), 25%, 50ML: HCPCS | Performed by: INTERNAL MEDICINE

## 2022-05-12 PROCEDURE — 99233 SBSQ HOSP IP/OBS HIGH 50: CPT | Performed by: INTERNAL MEDICINE

## 2022-05-12 PROCEDURE — 76604 US EXAM CHEST: CPT

## 2022-05-12 PROCEDURE — 85610 PROTHROMBIN TIME: CPT

## 2022-05-12 PROCEDURE — 84157 ASSAY OF PROTEIN OTHER: CPT

## 2022-05-12 PROCEDURE — 89051 BODY FLUID CELL COUNT: CPT

## 2022-05-12 PROCEDURE — 84100 ASSAY OF PHOSPHORUS: CPT

## 2022-05-12 PROCEDURE — 97166 OT EVAL MOD COMPLEX 45 MIN: CPT

## 2022-05-12 PROCEDURE — 97535 SELF CARE MNGMENT TRAINING: CPT

## 2022-05-12 PROCEDURE — 83615 LACTATE (LD) (LDH) ENZYME: CPT

## 2022-05-12 PROCEDURE — 86141 C-REACTIVE PROTEIN HS: CPT

## 2022-05-12 PROCEDURE — 82140 ASSAY OF AMMONIA: CPT

## 2022-05-12 PROCEDURE — 82962 GLUCOSE BLOOD TEST: CPT

## 2022-05-12 PROCEDURE — 2580000003 HC RX 258: Performed by: NURSE PRACTITIONER

## 2022-05-12 PROCEDURE — 36592 COLLECT BLOOD FROM PICC: CPT

## 2022-05-12 PROCEDURE — 82945 GLUCOSE OTHER FLUID: CPT

## 2022-05-12 PROCEDURE — 6360000002 HC RX W HCPCS: Performed by: INTERNAL MEDICINE

## 2022-05-12 PROCEDURE — 0W993ZZ DRAINAGE OF RIGHT PLEURAL CAVITY, PERCUTANEOUS APPROACH: ICD-10-PCS | Performed by: INTERNAL MEDICINE

## 2022-05-12 PROCEDURE — 2000000000 HC ICU R&B

## 2022-05-12 PROCEDURE — 84145 PROCALCITONIN (PCT): CPT

## 2022-05-12 PROCEDURE — 2500000003 HC RX 250 WO HCPCS: Performed by: INTERNAL MEDICINE

## 2022-05-12 PROCEDURE — 87205 SMEAR GRAM STAIN: CPT

## 2022-05-12 PROCEDURE — 71045 X-RAY EXAM CHEST 1 VIEW: CPT

## 2022-05-12 PROCEDURE — 32555 ASPIRATE PLEURA W/ IMAGING: CPT | Performed by: INTERNAL MEDICINE

## 2022-05-12 PROCEDURE — 92610 EVALUATE SWALLOWING FUNCTION: CPT

## 2022-05-12 PROCEDURE — 2709999900 HC NON-CHARGEABLE SUPPLY

## 2022-05-12 PROCEDURE — 83986 ASSAY PH BODY FLUID NOS: CPT

## 2022-05-12 RX ORDER — ALBUMIN (HUMAN) 12.5 G/50ML
25 SOLUTION INTRAVENOUS ONCE
Status: COMPLETED | OUTPATIENT
Start: 2022-05-12 | End: 2022-05-12

## 2022-05-12 RX ADMIN — SPIRONOLACTONE 100 MG: 50 TABLET ORAL at 15:02

## 2022-05-12 RX ADMIN — SODIUM CHLORIDE, PRESERVATIVE FREE 10 ML: 5 INJECTION INTRAVENOUS at 08:17

## 2022-05-12 RX ADMIN — DULOXETINE 60 MG: 30 CAPSULE, DELAYED RELEASE ORAL at 20:04

## 2022-05-12 RX ADMIN — LACTULOSE 20 G: 10 SOLUTION ORAL at 15:12

## 2022-05-12 RX ADMIN — LACTULOSE 20 G: 10 SOLUTION ORAL at 20:03

## 2022-05-12 RX ADMIN — SODIUM CHLORIDE 1 G: 1 TABLET ORAL at 20:03

## 2022-05-12 RX ADMIN — SODIUM CHLORIDE, PRESERVATIVE FREE 10 ML: 5 INJECTION INTRAVENOUS at 20:04

## 2022-05-12 RX ADMIN — ALBUMIN (HUMAN) 25 G: 0.25 INJECTION, SOLUTION INTRAVENOUS at 15:01

## 2022-05-12 RX ADMIN — LIDOCAINE HYDROCHLORIDE 5 ML: 10 INJECTION, SOLUTION INFILTRATION; PERINEURAL at 12:46

## 2022-05-12 RX ADMIN — MAGNESIUM OXIDE 400 MG (241.3 MG MAGNESIUM) TABLET 200 MG: TABLET at 20:04

## 2022-05-12 RX ADMIN — FUROSEMIDE 40 MG: 10 INJECTION, SOLUTION INTRAMUSCULAR; INTRAVENOUS at 08:19

## 2022-05-12 RX ADMIN — FUROSEMIDE 40 MG: 10 INJECTION, SOLUTION INTRAMUSCULAR; INTRAVENOUS at 18:35

## 2022-05-12 RX ADMIN — RIFAXIMIN 550 MG: 550 TABLET ORAL at 20:03

## 2022-05-12 ASSESSMENT — PAIN SCALES - GENERAL
PAINLEVEL_OUTOF10: 0

## 2022-05-12 ASSESSMENT — PULMONARY FUNCTION TESTS: PIF_VALUE: 23

## 2022-05-12 NOTE — PROGRESS NOTES
Occupational Therapy    [facility] ACUTE CARE OCCUPATIONAL THERAPY EVALUATION  Moreno Cooper, 1945, 2119/2119-A, 5/12/2022    History  [place]:  The primary encounter diagnosis was Acute on chronic respiratory failure with hypoxemia ([address] Diagnoses of Pleural effusion, Alcoholic cirrhosis of liver with ascites ([address] and Recurrent right pleural effusion were also pertinent to this visit. Patient  has a past medical history of Anxiety, Cirrhosis, alcoholic ([address] GERD (gastroesophageal reflux disease), GERD (gastroesophageal reflux disease), Hyperlipidemia, Hypertension, Portal hypertension ([address] Pulmonary nodules, Sleep apnea, and SOB (shortness of breath). Patient  has a past surgical history that includes Cholecystectomy; Vasectomy; Colonoscopy; Endoscopy, colon, diagnostic; Foot surgery; and Upper gastrointestinal endoscopy (N/A, 1/19/2022). Subjective:  Patient states:  Pt requiring frequent re-direction to conversation. Pain:  Denies but confused.     Communication with other providers:  Handoff to RN  Restrictions: Pt intubated 5/05/2022 extubated 5/11/2022 General Precautions, Fall Risk    Home Setup/Prior level of function  Social/Functional History  Lives With: Spouse  Type of Home: House  Home Layout: One level,Performs ADL's on one level  Home Access: Stairs to enter with rails  Entrance Stairs - Number of Steps: 5  Entrance Stairs - Rails: Both  Bathroom Shower/Tub: Walk-in shower  Bathroom Toilet: Standard  Bathroom Accessibility: Accessible  Has the patient had two or more falls in the past year or any fall with injury in the past year?: Yes (2, tripped on dog, injured both (non hospital))  Receives Help From: Family (2 granddaughters)  ADL Assistance: Independent  Homemaking Assistance: Independent  Homemaking Responsibilities: Yes  Ambulation Assistance: Independent  Transfer Assistance: Independent  Active : Yes    Examination of body systems (includes body structures/functions, activity/participation limitations):  · Observation:  Supine in bed upon arrival, agreeable to therapy, wife in room  · Vision:  [facility] [place]  · Hearing:  [facility] [place]  · Cardiopulmonary:  No 02 needs. Vitals remained stable throughout session      Body Systems and functions:  · ROM R/L:  WFL. · Strength R/L:  4-/5,   · Sensation: Denies numbness  · Tone: Normal  · Coordination: Decreased speed BUE  · Perception: mod decreased initiation/sequencing w/ multiple VCs and tactile cues to overcome, difficulty sitting/standing midline w/ posterior lean    Activities of Daily Living (ADLs):  · Feeding: Setup/SBA (at end of session eating apple sauce sitting upright in chair)  · Grooming: Setup/Loren (washing hands/face w/ warm washcloth)  · UB bathing: Loren  · LB bathing: modA  · UB dressing: Loren  · LB dressing: maxA (donning socks supine in bed)  · Toileting: maxA    Cognitive and Psychosocial Functioning:  · Overall cognitive status:  AxO to self only, decreased problem solving/short term memory/safety awareness, decreased insight into deficits, frequent re-direction to task and conversation, hand over hand to initiate/sequence, increased processing time  · Affect: Confused       Mobility:  · Supine to sit:  modA w/ hand over hand to sequence and increased time to complete  · Transfers: modA STS from EOB w/ 4 STS at EOB up to RW  · Sitting balance:  Initially modA due to R posterior lean moving to CGA w/ proper hand placement and positioning and cues for increased awareness of sitting midline  · Standing balance:  modA due to posterior lean ~ 1 minute each trial w/ 4 trials  · Functional Mobility: modA ~10 feet w/ L side steps only and hand over hand to manipulate RW and to sequence steps   · Toilet/Shower Transfers: DNT             AM-PAC Daily Activity Inpatient   How much help for putting on and taking off regular lower body clothing?: Total  How much help for Bathing?: A Lot  How much help for Toileting?: Total  How much help for putting on and taking off regular upper body clothing?: A Little  How much help for taking care of personal grooming?: A Little  How much help for eating meals?: A Little  AM-[facility] Inpatient Daily Activity Raw Score: 13  AM-PAC Inpatient ADL T-Scale Score : 32.03  ADL Inpatient CMS 0-100% Score: 63.03  ADL Inpatient CMS G-Code Modifier : CL    Treatment:  Self Care Training:   Cues were given for safety, sequence, UE/LE placement, visual cues, and balance. Activities performed today included feeding, grooming, LB dressing    Therapeutic Activity Training:   Therapeutic activity training was instructed today. Cues were given for safety, sequence, UE/LE placement, awareness, and balance. Activities performed today included bed mobility training, sup-sit, sit-stand, functional mobility, stand to sit  . Safety: patient left in chair with chair alarm, call light within reach, RN notified, gait belt used. Assessment:  Pt is a 77 yo male admitted from home for pleural effusion. Pt at baseline is Independent for ADLs Independent for high level IADLs and Independent for functional transfers/mobility. Pt currently presents w/ deficits in ADL and high level IADL independence, functional activity tolerance, dynamic sitting and standing balance and tolerance and functional transfers, BUE strength, initiation/sequencing, BUE speed and coordination, cognition. Pt would benefit from continued acute care OT services w/ discharge to ARU  Complexity: Moderate  Prognosis: Good, no significant barriers to participation at this time.    Plan  Times per Week: 4x+  Times per Day: Daily  Current Treatment Recommendations: Strengthening,ROM,Balance training,Functional mobility training,Endurance training,Pain management,Safety education & training,Patient/Caregiver education & training,Self-Care / ADL,Home management training,Neuromuscular re-education,Cognitive reorientation,Equipment evaluation, education, & procurement,Positioning,Coordination training,Cognitive/Perceptual training     Equipment: defer    Goals:  Pt goal: go home  Time Frame for STGs: discharge  Goal 1: Pt will perform UE ADLs Independent  Goal 2: Pt will perform LE ADLs Independent  Goal 3: Pt will perform toileting Independent  Goal 4: Pt will perform functional transfer w/ AD Skye  Goal 5: Pt will perform functional mobility w/ AD Skye  Goal 6: Pt will perform therex/theract in order to increase functional activity tolerance and dynamic standing balance    Treatment plan:  Pt will perform functional task in stand reaching in all 3 planes in order to increase dynamic standing balance and functional activity tolerance    Recommendations for NURSING activity: Up to chair for all 3 meals and up to [facility] for all toileting needs    Time:   Time in: 1415  Time out: 1450  Timed treatment minutes: 25 minutes  Total time: 35 minutes    Electronically signed by:    Amena MARRERO/HARRY [id number]  4:26 PM,5/12/2022

## 2022-05-12 NOTE — PROGRESS NOTES
Physical Therapy  Facility/Department: [facility] ICU  Physical Therapy Initial Assessment    Name: Gume Rocha  : 1945  MRN: [id number]  Date of Service: 2022    Discharge Recommendations:   (ARU)   PT Equipment Recommendations  Equipment Needed:  (pend to next LOC)      Patient Diagnosis(es): The primary encounter diagnosis was Acute on chronic respiratory failure with hypoxemia ([address] Diagnoses of Pleural effusion, Alcoholic cirrhosis of liver with ascites ([address] and Recurrent right pleural effusion were also pertinent to this visit. Past Medical History:  has a past medical history of Anxiety, Cirrhosis, alcoholic ([address] GERD (gastroesophageal reflux disease), GERD (gastroesophageal reflux disease), Hyperlipidemia, Hypertension, Portal hypertension ([address] Pulmonary nodules, Sleep apnea, and SOB (shortness of breath). Past Surgical History:  has a past surgical history that includes Cholecystectomy; Vasectomy; Colonoscopy; Endoscopy, colon, diagnostic; Foot surgery; and Upper gastrointestinal endoscopy (N/A, 2022). Assessment   Body Structures, Functions, Activity Limitations Requiring Skilled Therapeutic Intervention: Decreased functional mobility ; Decreased safe awareness;Decreased ADL status; Decreased cognition; Increased pain;Decreased balance;Decreased endurance;Decreased body mechanics  Assessment: Pt presents 10 days s/p admission for difficulty breathing; found to have large R pleural effusion, vented -, thoracentesis on  and . Pt presents w/ the above listed deficits, however, he is primarily limited by impaired cognition. Requires moderate to heavy assist for all functional mobility, impaired balance, dec safety awareness, intermittent agitation/impulsivity, dec endurance, cognitive delay and disorientation affecting all activities at this time. Recommending ARU 2/2 extent of deficits and ind PLOF.   Therapy Prognosis: Good  Decision Making: Medium Complexity  Requires PT Follow-Up: Yes  Activity Tolerance  Activity Tolerance: Treatment limited secondary to decreased cognition     Treatment:  Therapeutic Activity Training:   Therapeutic activity training was instructed today. Cues were given for safety, sequence, UE/LE placement, awareness, and balance. Activities performed today included bed mobility training, sup-sit, sit-stand, SPT.   Initiated functional mobility, DC planning, OOB activities this session    Plan   Plan  Plan:  (3-4+)  Plan weeks: 1  Current Treatment Recommendations: Strengthening,ROM,Balance training,Endurance training,Functional mobility training,Cognitive/Perceptual training,Gait training,Stair training,Transfer training,ADL/Self-care training,Cognitive reorientation,Patient/Caregiver education & training,Safety education & training,Pain management,Equipment evaluation, education, & procurement,Modalities,Positioning,Therapeutic activities  Safety Devices  Type of Devices: Call light within reach,Chair alarm in place,Gait belt,Patient at risk for falls,Left in chair,Nurse notified  Restraints  Restraints Initially in Place: No     Restrictions  Restrictions/Precautions  Restrictions/Precautions: Fall Risk,General Precautions     Subjective   General  Chart Reviewed: Yes  Patient assessed for rehabilitation services?: Yes  Family / Caregiver Present: Yes (wife and dtr)  Follows Commands: Within Functional Limits  Subjective  Subjective: It'll be the same temperature in this room in [facility] History  Social/Functional History  Lives With: Spouse  Type of Home: House  Home Layout: One level,Performs ADL's on one level  Home Access: Stairs to enter with rails  Entrance Stairs - Number of Steps: 5  Entrance Stairs - Rails: Both  Bathroom Shower/Tub: Walk-in shower  Bathroom Toilet: Standard  Bathroom Accessibility: Accessible  Has the patient had two or more falls in the past year or any fall with injury in the past year?: Yes (2, tripped on dog, injured both (non hospital))  Receives Help From: Family (2 granddaughters)  ADL Assistance: Independent  Homemaking Assistance: Independent  Homemaking Responsibilities: Yes  Ambulation Assistance: Independent  Transfer Assistance: Independent  Active : Yes  Vision/Hearing  Vision Exceptions: Wears glasses for reading  Hearing: Exceptions to [facility]  Hearing Exceptions: Hard of hearing/hearing concerns    Cognition   Orientation  Orientation Level: Oriented to person;Disoriented to situation;Disoriented to time;Oriented to place  Cognition  Overall Cognitive Status: Exceptions  Arousal/Alertness: Delayed responses to stimuli  Following Commands: Inconsistently follows commands (very delayed processing, difficulty attending to cues, difficulty redirecting)  Attention Span: Difficulty attending to directions  Memory: Decreased recall of recent events  Safety Judgement: Decreased awareness of need for safety;Decreased awareness of need for assistance  Problem Solving: Assistance required to identify errors made;Assistance required to correct errors made  Insights: Decreased awareness of deficits  Initiation: Requires cues for some  Sequencing: Requires cues for some  Cognition Comment: confused, difficulty command following, difficult to redirect, dec safety awareness     Objective   Pulse: 86  Heart Rate Source: Monitor  BP: (!) 150/75  BP Location: Right upper arm  BP Method: Automatic  Patient Position: Semi fowlers  MAP (Calculated): 100  Resp: 24  SpO2: 96 %  O2 Device: Nasal cannula     Observation/Palpation  Posture: Good  Observation: Seated in recliner, awake, confused, agreeable w/ therapy. He requires assist of 2 to help w/ redirection, attend to safety; he has dec awareness generally at this time. Tele, pulse ox, BP cuff, chair alarm, peripheral IV in place.         AROM RLE (degrees)  RLE AROM: WNL  AROM LLE (degrees)  LLE AROM : WNL  Strength RLE  Comment: grossly 5/5  Strength LLE  Comment: grossly 5/5           Bed mobility  Bed Mobility Comments: recieved in recliner, requesting to return to recliner on completion; NT this session  Transfers  Sit to Stand:  Moderate Assistance (cues for ant ws and use of BL UE's)  Stand to sit: Maximum Assistance (control of descent, cues for redirection, attending to balance)  Ambulation  Comments: NT 2/2 safety concerns and poor command follow     Balance  Sitting - Static: Fair;+  Standing - Static: Poor;+ (mod A to steady against retropulsion)    AM-PAC Score  -PAC Inpatient Mobility Raw Score : 11 (05/12/22 1605)  AM-PAC Inpatient T-Scale Score : 33.86 (05/12/22 1605)  Mobility Inpatient CMS 0-100% Score: 72.57 (05/12/22 1605)  Mobility Inpatient CMS G-Code Modifier : CL (05/12/22 1605)          Goals  Long Term Goals  Time Frame for Long term goals : 1 week  Long term goal 1: pt will complete bed mobility at min A  Long term goal 2: pt will complete transfers at [facility]  Long term goal 3: pt will ambulate 50 ft using FWW at [facility]  Patient Goals   Patient goals : return to PLOF      Therapy Time   Individual Concurrent Group Co-treatment   Time In 1525         Time Out 1551         Minutes 26         Timed Code Treatment Minutes: 16 Minutes (TA)       Stu Webb, PT, DPT

## 2022-05-12 NOTE — PROGRESS NOTES
-1726.1 ml      Vitals:   Vitals:    05/12/22 0800   BP: (!) 159/69   Pulse: 85   Resp: 23   Temp: 97.9 °F (36.6 °C)   SpO2: 96%     Physical Exam:   GEN Awake and alert, but a bit drowsy   EYES Pupils are equally round. No scleral erythema, discharge, or conjunctivitis. HENT Mucous membranes are moist. Oral pharynx without exudates, no evidence of thrush. NECK Supple, no apparent thyromegaly or masses. RESP Clear to auscultation, no wheezes, rales or rhonchi. Symmetric chest movement while on 2L of O2  CARDIO/VASC S1/S2 auscultated. Regular rate without appreciable murmurs, rubs, or gallops. No JVD or carotid bruits. Peripheral pulses equal bilaterally and palpable. No peripheral edema. GI Abdomen is soft without significant tenderness, masses, or guarding. Bowel sounds are normoactive. Rectal exam deferred.  No costovertebral angle tenderness. Schilling catheter is present. HEME/LYMPH No palpable cervical lymphadenopathy and no hepatosplenomegaly. No petechiae or ecchymoses. MSK No gross joint deformities. SKIN Normal coloration, warm, dry.   NEURO No focal deficit, follows command    Medications:   Medications:    furosemide  40 mg IntraVENous BID    sodium chloride  1 g Oral TID    insulin regular  0-12 Units SubCUTAneous Q6H    enoxaparin  40 mg SubCUTAneous Daily    lidocaine PF  5 mL IntraDERmal Once    sodium chloride flush  5-40 mL IntraVENous 2 times per day    DULoxetine  60 mg Oral Daily    lactulose  20 g Oral TID    levothyroxine  50 mcg Oral Daily    magnesium oxide  200 mg Oral BID    [Held by provider] nadolol  40 mg Oral Daily    rifAXIMin  550 mg Oral BID    spironolactone  100 mg Oral Daily      Infusions:    sodium chloride Stopped (05/06/22 1431)    norepinephrine Stopped (05/11/22 1030)    sodium chloride Stopped (05/10/22 0144)    sodium chloride       PRN Meds: potassium chloride, 20 mEq, PRN   Or  potassium chloride, 10 mEq, PRN  fentaNYL, 25 mcg, Q2H PRN  magnesium sulfate, 1,000 mg, PRN  sodium chloride flush, 5-40 mL, PRN  sodium chloride, , PRN  sodium phosphate IVPB, 15 mmol, PRN   Or  sodium phosphate IVPB, 15 mmol, PRN  sodium chloride flush, 5-40 mL, PRN  sodium chloride, , PRN  albuterol sulfate HFA, 2 puff, Q4H PRN  sodium chloride flush, 10 mL, PRN  sodium chloride, , PRN  nicotine polacrilex, 2 mg, Q1H PRN  acetaminophen, 650 mg, Q6H PRN   Or  acetaminophen, 650 mg, Q6H PRN  ondansetron, 4 mg, Q6H PRN  ALPRAZolam, 0.5 mg, Daily PRN          Electronically signed by Corinne Spore, MD on 5/12/2022 at 9:04 AM

## 2022-05-12 NOTE — PLAN OF CARE
Problem: Discharge Planning  Goal: Discharge to home or other facility with appropriate resources  Outcome: Progressing     Problem: Pain  Goal: Verbalizes/displays adequate comfort level or baseline comfort level  Outcome: Progressing     Problem: Safety - Medical Restraint  Goal: Remains free of injury from restraints (Restraint for Interference with Medical Device)  Description: INTERVENTIONS:  1. Determine that other, less restrictive measures have been tried or would not be effective before applying the restraint  2. Evaluate the patient's condition at the time of restraint application  3. Inform patient/family regarding the reason for restraint  4. Q2H: Monitor safety, psychosocial status, comfort, nutrition and hydration  Outcome: Completed     Problem: Nutrition Deficit:  Goal: Optimize nutritional status  Outcome: Progressing     Problem: Safety - Adult  Goal: Free from fall injury  Outcome: Progressing     Problem: ABCDS Injury Assessment  Goal: Absence of physical injury  Outcome: Progressing     Problem: Skin/Tissue Integrity  Goal: Absence of new skin breakdown  Description: 1. Monitor for areas of redness and/or skin breakdown  2. Assess vascular access sites hourly  3. Every 4-6 hours minimum:  Change oxygen saturation probe site  4. Every 4-6 hours:  If on nasal continuous positive airway pressure, respiratory therapy assess nares and determine need for appliance change or resting period.   Outcome: Progressing

## 2022-05-12 NOTE — PROGRESS NOTES
Speech Language Pathology  Facility/Department: [facility] ICU   CLINICAL BEDSIDE SWALLOW EVALUATION    NAME: Dannielle Garcia  : 1945  MRN: [id number]    ADMISSION DATE: 2022     Impressions: Dannielle Garcia was referred for a bedside swallow evaluation after being admitted due to [address] [place] [address] with SOB. Pt found to have acute on chronic hypoxic respiratory failure, recurrent R-sided pleural effusion, Rhinovirus, intubation (2022-2022), thoracentesis (2022), acute hepatic encephalopathy, decompensated alcoholic liver cirrhosis with ascites, and hypotension. Pt has history of CKD stage III, hypothyroidism, and morbid obesity. Pt was seen for evaluation seated upright in bed, alert, distractible, and required cueing for participation. Pt's wife was present throughout session. Pt had weak vocal quality that improved throughout session. Oral mechanism examination was [facility] with no asymmetry. Pt was presented with PO trials of ice chips, thin liquids (tspn/cup/straw), mildly thick liquids (tspn/cup/straw), honey thick liquids (tspn/cup/straw), puree, and regular solids. Oral stage is mild to moderately impaired characterized by bolus holding, prolonged mastication. Pt was noted to take large bites of solid foods and needed multiple cues for smaller bites. Suspect pharyngeal dysphagia with intermittent delayed coughing with thin liquids (cup/straw), mildly thick liquids (cup/straw), and moderately thick liquids (straw/cup). Of note, pt has increased s/s of penetration/aspiration when taking larger drinks. Pt was impulsive throughout session and needed multiple cues to take smaller sips. Noted reduced s/s of penetration/aspiration with smaller sips. Recommend soft and bite sized solids with honey thick liquids. Recommend aspiration precautions, avoid consecutive drinks, avoid straws, and max assist to follow swallowing strategies (small sips/bites). SLP will continue to follow. Results/recommendations d/w pt, wife, RN.     ADMITTING DIAGNOSIS: has Shortness of breath; History of colonic polyps; Alcoholic cirrhosis of liver with ascites (HCC)-Dr Jacques; Mixed hyperlipidemia; Hypertension; History of alcohol abuse; Gastroesophageal reflux disease without esophagitis; Anxiety; Pulmonary nodules; Hyperglycemia; Acquired hypothyroidism; Class 3 severe obesity with body mass index (BMI) of 40.0 to 44.9 in adult [facility]); Bilateral hearing loss; Increased ammonia level; JENNIFER (obstructive sleep apnea); Portal hypertension (HCC); SOB (shortness of breath); Cirrhosis of liver with ascites ([address] Secondary esophageal varices without bleeding ([address] Gastric polyps; Hearing loss; Sepsis ([address] Hepatic encephalopathy ([address] Pleural effusion; Acute on chronic respiratory failure with hypoxemia ([address] and Acute respiratory failure ([address] on their problem list.  ONSET DATE: 5/2/2022    Recent Chest Xray/CT of Chest: 5/12/2022:  Impression   Patient is status post thoracentesis without discrete pneumothorax.       Intervally removed enteric and endotracheal tubes.       Similar opacification of the right hemithorax.           Date of Eval: 5/12/2022  Evaluating Therapist: MARISELA Carter    Current Diet level:  Current Diet : NPO      Primary Complaint  Patient Complaint: SOB    Pain:  Pain Assessment  Pain Assessment: 0-10  Pain Level: 0  Patient's Stated Pain Goal: 0 - No pain  Pain Location: Back  Pain Descriptors: Aching  Pain Type: Chronic pain  Pain Frequency: Continuous  Multiple Pain Sites: No    Reason for Referral  Jordon Vyas was referred for a bedside swallow evaluation to assess the efficiency of his swallow function, identify signs and symptoms of aspiration and make recommendations regarding safe dietary consistencies, effective compensatory strategies, and safe eating environment. Impression  Dysphagia Diagnosis: Mild to moderate oral stage dysphagia; Moderate pharyngeal stage dysphagia  Dysphagia Outcome Severity Scale: Level 3: Moderate dysphagia- Total assisstance, supervision or strategies. Two or more diet consistencies restricted     Treatment Plan  Requires SLP Intervention: Yes  Duration of Treatment: LOS  D/C Recommendations: To be determined       Recommended Diet and Intervention        Recommended Form of Meds: Crushed in puree as able  Recommendations: Assistance with meals; Total feed;Supervision with meals  Therapeutic Interventions: Diet tolerance monitoring; Therapeutic PO trials with SLP    Compensatory Swallowing Strategies  Compensatory Swallowing Strategies : Eat/Feed slowly; No straws;Assist feed    Treatment/Goals  Short-term Goals  Goal 1: Pt will consume honey-tick liquids and soft and bite sized diet with no s/s of penetration/aspiration. Goal 2: Pt will upgrade to mildly thick liwuids and regular solids with no s/s of penetration/aspiration. Goal 3: Pt/caregivers will indicate understanding of all recommendations. General  Chart Reviewed: Yes  Behavior/Cognition: Alert; Cooperative;Confused; Requires cueing (wife at bedside)  Respiratory Status: O2 via nasual cannula  O2 Device: Nasal cannula  Communication Observation: Functional  Follows Directions: Simple  Dentition: Adequate (some chipped teeth)  Patient Positioning: Upright in bed  Baseline Vocal Quality: Weak  Prior Dysphagia History: N/A  Consistencies Administered: Regular;Pureed; Thin - teaspoon; Thin - cup; Thin - straw; Moderately Thick  - cup;Moderately Thick - teaspoon; Moderately Thick  - straw;Mildly Thick - cup;Mildly Thick- teaspoon;Mildly Thick - straw           Vision/Hearing  Vision  Vision: Within Functional Limits  Hearing  Hearing: Exceptions to [facility]  Hearing Exceptions: Hard of hearing/hearing concerns    Oral Motor Deficits       Oral Phase Dysfunction  Oral Phase  Oral Phase: Exceptions     Indicators of Pharyngeal Phase Dysfunction        Prognosis  Individuals consulted  Consulted and agree

## 2022-05-12 NOTE — PROGRESS NOTES
Pulmonary and Critical Care  Progress Note      VITALS:  BP (!) 159/69   Pulse 85   Temp 97.9 °F (36.6 °C) (Oral)   Resp 23   Ht 5' 7.99\" (1.727 m)   Wt 285 lb (129.3 kg)   SpO2 96%   BMI 43.34 kg/m²     Subjective:   CHIEF COMPLAINT :SOB     HPI:                The patient is sleepy but arousable. He is not in acute resp distress. Objective:   PHYSICAL EXAM:    LUNGS:decreased air entry right base  Abd-distended, BS+,NT  Ext- no pedal edema  CVS-s1s2, no murmurs      DATA:    CBC:  Recent Labs     05/11/22  1030 05/12/22  0530   WBC 13.6* 12.5*   RBC 4.24* 4.12*   HGB 13.4* 13.3*   HCT 42.0 41.2*    208   MCV 99.1 100.0   MCH 31.6* 32.3*   MCHC 31.9* 32.3   RDW 14.9 15.1*   SEGSPCT 72.1* 69.3*      BMP:  Recent Labs     05/10/22  0602 05/10/22  1215 05/10/22  1805 05/11/22  0515 05/12/22  0530     --  139 138 144   K 3.4*   < > 4.4 3.5 4.4     --  102 101 104   CO2 26  --  26 27 26   BUN 25*  --   --  28* 33*   CREATININE 1.3  --   --  1.5* 1.7*   CALCIUM 8.9  --   --  8.5 9.2   GLUCOSE 115*  --   --  146* 105*    < > = values in this interval not displayed. ABG:  Recent Labs     05/10/22  0600 05/11/22  0600   PH 7.60* 7.50*   PO2ART 115* 66*   PFS3BXL 29.0* 35.0   O2SAT 96.4 91.4*     BNP  No results found for: BNP   D-Dimer:  Lab Results   Component Value Date    DDIMER 1606 (H) 09/13/2021      1.  Radiology: None      Assessment/Plan     Patient Active Problem List    Diagnosis Date Noted    Acute respiratory failure ([facility] 05/04/2022     Priority: Medium    Acute on chronic respiratory failure with hypoxemia (HCC)      Priority: Medium    Pleural effusion 05/02/2022     Priority: Medium    Sepsis ([facility] 03/19/2022    Hepatic encephalopathy ([facility] 03/19/2022    Hearing loss 03/11/2022    Cirrhosis of liver with ascites ([facility]     Secondary esophageal varices without bleeding (HCC)     Gastric polyps     SOB (shortness of breath) 09/13/2021    Portal hypertension ([facility] 08/30/2021    JENNIFER (obstructive sleep apnea) 08/09/2021    Bilateral hearing loss 05/07/2021    Increased ammonia level 05/07/2021    Class 3 severe obesity with body mass index (BMI) of 40.0 to 44.9 in adult [facility]) 09/21/2020    Hyperglycemia 12/04/2019    Acquired hypothyroidism 12/04/2019    Pulmonary nodules 68/71/0404    Alcoholic cirrhosis of liver with ascites (HCC)-Dr Jacques 08/08/2019    Mixed hyperlipidemia 08/08/2019    Hypertension 08/08/2019    History of alcohol abuse 08/08/2019    Gastroesophageal reflux disease without esophagitis 08/08/2019    Anxiety 08/08/2019    Shortness of breath 05/26/2019    History of colonic polyps 01/15/2019   Acute Hypoxic resp failure  Recurrent Right pleural effusion 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 JENNIFER  VDRF  Toxic Metabolic encephalopathy  Moderate Malnutrition       1. Right thoracentesis at noon today  2. ICS  3. OOB  4. PT/OT  5. Diuresis  6. Inhalers  7. Keep sats > 92%  8. Await transfer to floor  9.  C.w present management    Electronically signed by Anabelle Omalley MD on 5/12/2022 at 9:05 AM

## 2022-05-12 NOTE — PROGRESS NOTES
Dr. Josué Pinon at the bedside to perform procedure. Timeout list completed in flow sheet. Right thoracentesis under ultrasound guidance. Ultrasound also at the bedside.

## 2022-05-12 NOTE — PROGRESS NOTES
Dr. Shayan Akbar was sent a [employer] message at this time. Patient's recent sodium level was 144, and is ordered a sodium tablet this morning. Per Dr. Shayan Akbar it is ok from his standpoint to administer this medication tonight with BMP ordered in the morning for nephrology to change if needed.

## 2022-05-12 NOTE — PROCEDURES
Kanika Galicia is a 78 y.o. male patient. 1. Acute on chronic respiratory failure with hypoxemia (HCC)    2. Pleural effusion    3. Alcoholic cirrhosis of liver with ascites ([facility]      Past Medical History:   Diagnosis Date    Anxiety     Cirrhosis, alcoholic ([facility]     GERD (gastroesophageal reflux disease) 2000    GERD (gastroesophageal reflux disease)     Hyperlipidemia     Hypertension     Portal hypertension (HCC)     Pulmonary nodules     Sleep apnea     SOB (shortness of breath)      Blood pressure (!) 156/96, pulse 84, temperature 98.1 °F (36.7 °C), temperature source Oral, resp. rate 18, height 5' 7.99\" (1.727 m), weight 285 lb (129.3 kg), SpO2 95 %. Thoracentesis    Date/Time: 5/12/2022 1:06 PM  Performed by: Sai Valencia MD  Authorized by: Sai Valencia MD   Consent: Verbal consent obtained. Written consent obtained. Risks and benefits: risks, benefits and alternatives were discussed  Consent given by: spouse  Patient understanding: patient does not state understanding of the procedure being performed  Patient consent: the patient's understanding of the procedure does not match consent given  Procedure consent: procedure consent matches procedure scheduled  Relevant documents: relevant documents present and verified  Test results: test results available and properly labeled  Site marked: the operative site was marked  Imaging studies: imaging studies available  Patient identity confirmed: verbally with patient and arm band  Time out: Immediately prior to procedure a \"time out\" was called to verify the correct patient, procedure, equipment, support staff and site/side marked as required. Procedure purpose: diagnostic and therapeutic  Indications: pleural effusion  Preparation: Patient was prepped and draped in the usual sterile fashion.   Local anesthesia used: yes    Anesthesia:  Local anesthesia used: yes  Local Anesthetic: lidocaine 1% without epinephrine  Anesthetic total: 15 mL    Sedation:  Patient sedated: no    Preparation: skin prepped with ChloraPrep  Patient position: left lateral decubitus  Ultrasound guidance: yes  Location: right posterior  Intercostal space: 8th  Puncture method: over-the-needle catheter  Needle size: 16  Catheter size: 14 gauge  Number of attempts: 1  Drainage amount: 1400 ml  Drainage characteristics: serosanguinous  Chest x-ray performed: yes  Chest x-ray interpreted by radiologist.      Right Pleural fluid analysis    Camila Corral MD  5/12/2022

## 2022-05-13 LAB
AMMONIA: 40 UMOL/L (ref 16–60)
ANION GAP SERPL CALCULATED.3IONS-SCNC: 10 MMOL/L (ref 4–16)
BASOPHILS ABSOLUTE: 0.2 K/CU MM
BASOPHILS RELATIVE PERCENT: 2 % (ref 0–1)
BUN BLDV-MCNC: 37 MG/DL (ref 6–23)
CALCIUM SERPL-MCNC: 9.6 MG/DL (ref 8.3–10.6)
CHLORIDE BLD-SCNC: 105 MMOL/L (ref 99–110)
CO2: 29 MMOL/L (ref 21–32)
CREAT SERPL-MCNC: 1.7 MG/DL (ref 0.9–1.3)
DIFFERENTIAL TYPE: ABNORMAL
EOSINOPHILS ABSOLUTE: 0.4 K/CU MM
EOSINOPHILS RELATIVE PERCENT: 4 % (ref 0–3)
GFR AFRICAN AMERICAN: 48 ML/MIN/1.73M2
GFR NON-AFRICAN AMERICAN: 40 ML/MIN/1.73M2
GLUCOSE BLD-MCNC: 125 MG/DL (ref 70–99)
GLUCOSE BLD-MCNC: 132 MG/DL (ref 70–99)
GLUCOSE BLD-MCNC: 156 MG/DL (ref 70–99)
HCT VFR BLD CALC: 38.4 % (ref 42–52)
HEMOGLOBIN: 12.6 GM/DL (ref 13.5–18)
INR BLD: 1.13 INDEX
LYMPHOCYTES ABSOLUTE: 1.2 K/CU MM
LYMPHOCYTES RELATIVE PERCENT: 11 % (ref 24–44)
MAGNESIUM: 2.3 MG/DL (ref 1.8–2.4)
MCH RBC QN AUTO: 32.1 PG (ref 27–31)
MCHC RBC AUTO-ENTMCNC: 32.8 % (ref 32–36)
MCV RBC AUTO: 97.7 FL (ref 78–100)
MONOCYTES ABSOLUTE: 1.8 K/CU MM
MONOCYTES RELATIVE PERCENT: 17 % (ref 0–4)
PDW BLD-RTO: 14.7 % (ref 11.7–14.9)
PHOSPHORUS: 2.9 MG/DL (ref 2.5–4.9)
PLATELET # BLD: 179 K/CU MM (ref 140–440)
PMV BLD AUTO: 10.6 FL (ref 7.5–11.1)
POTASSIUM SERPL-SCNC: 4 MMOL/L (ref 3.5–5.1)
PROTHROMBIN TIME: 14.6 SECONDS (ref 11.7–14.5)
RBC # BLD: 3.93 M/CU MM (ref 4.6–6.2)
SEGMENTED NEUTROPHILS ABSOLUTE COUNT: 7.2 K/CU MM
SEGMENTED NEUTROPHILS RELATIVE PERCENT: 66 % (ref 36–66)
SODIUM BLD-SCNC: 144 MMOL/L (ref 135–145)
WBC # BLD: 10.8 K/CU MM (ref 4–10.5)

## 2022-05-13 PROCEDURE — 85007 BL SMEAR W/DIFF WBC COUNT: CPT

## 2022-05-13 PROCEDURE — 92526 ORAL FUNCTION THERAPY: CPT

## 2022-05-13 PROCEDURE — 36592 COLLECT BLOOD FROM PICC: CPT

## 2022-05-13 PROCEDURE — 94761 N-INVAS EAR/PLS OXIMETRY MLT: CPT

## 2022-05-13 PROCEDURE — 84100 ASSAY OF PHOSPHORUS: CPT

## 2022-05-13 PROCEDURE — 6360000002 HC RX W HCPCS: Performed by: INTERNAL MEDICINE

## 2022-05-13 PROCEDURE — 6360000002 HC RX W HCPCS: Performed by: HOSPITALIST

## 2022-05-13 PROCEDURE — 85027 COMPLETE CBC AUTOMATED: CPT

## 2022-05-13 PROCEDURE — 6370000000 HC RX 637 (ALT 250 FOR IP): Performed by: NURSE PRACTITIONER

## 2022-05-13 PROCEDURE — 2580000003 HC RX 258: Performed by: INTERNAL MEDICINE

## 2022-05-13 PROCEDURE — 97530 THERAPEUTIC ACTIVITIES: CPT

## 2022-05-13 PROCEDURE — 6370000000 HC RX 637 (ALT 250 FOR IP): Performed by: INTERNAL MEDICINE

## 2022-05-13 PROCEDURE — 97112 NEUROMUSCULAR REEDUCATION: CPT

## 2022-05-13 PROCEDURE — 82962 GLUCOSE BLOOD TEST: CPT

## 2022-05-13 PROCEDURE — 80048 BASIC METABOLIC PNL TOTAL CA: CPT

## 2022-05-13 PROCEDURE — 2060000000 HC ICU INTERMEDIATE R&B

## 2022-05-13 PROCEDURE — 2700000000 HC OXYGEN THERAPY PER DAY

## 2022-05-13 PROCEDURE — 99232 SBSQ HOSP IP/OBS MODERATE 35: CPT | Performed by: INTERNAL MEDICINE

## 2022-05-13 PROCEDURE — 82140 ASSAY OF AMMONIA: CPT

## 2022-05-13 PROCEDURE — 83735 ASSAY OF MAGNESIUM: CPT

## 2022-05-13 PROCEDURE — 85610 PROTHROMBIN TIME: CPT

## 2022-05-13 RX ORDER — INSULIN LISPRO 100 [IU]/ML
0-6 INJECTION, SOLUTION INTRAVENOUS; SUBCUTANEOUS
Status: DISCONTINUED | OUTPATIENT
Start: 2022-05-13 | End: 2022-05-18 | Stop reason: HOSPADM

## 2022-05-13 RX ORDER — INSULIN LISPRO 100 [IU]/ML
0-12 INJECTION, SOLUTION INTRAVENOUS; SUBCUTANEOUS
Status: DISCONTINUED | OUTPATIENT
Start: 2022-05-14 | End: 2022-05-18 | Stop reason: HOSPADM

## 2022-05-13 RX ADMIN — LACTULOSE 20 G: 10 SOLUTION ORAL at 21:12

## 2022-05-13 RX ADMIN — RIFAXIMIN 550 MG: 550 TABLET ORAL at 09:01

## 2022-05-13 RX ADMIN — MAGNESIUM OXIDE 400 MG (241.3 MG MAGNESIUM) TABLET 200 MG: TABLET at 21:12

## 2022-05-13 RX ADMIN — ENOXAPARIN SODIUM 40 MG: 100 INJECTION SUBCUTANEOUS at 09:02

## 2022-05-13 RX ADMIN — MAGNESIUM OXIDE 400 MG (241.3 MG MAGNESIUM) TABLET 200 MG: TABLET at 09:01

## 2022-05-13 RX ADMIN — FUROSEMIDE 40 MG: 10 INJECTION, SOLUTION INTRAMUSCULAR; INTRAVENOUS at 09:02

## 2022-05-13 RX ADMIN — DULOXETINE 60 MG: 30 CAPSULE, DELAYED RELEASE ORAL at 21:12

## 2022-05-13 RX ADMIN — SODIUM CHLORIDE, PRESERVATIVE FREE 10 ML: 5 INJECTION INTRAVENOUS at 21:12

## 2022-05-13 RX ADMIN — FUROSEMIDE 40 MG: 10 INJECTION, SOLUTION INTRAMUSCULAR; INTRAVENOUS at 21:13

## 2022-05-13 RX ADMIN — SPIRONOLACTONE 100 MG: 50 TABLET ORAL at 09:01

## 2022-05-13 RX ADMIN — RIFAXIMIN 550 MG: 550 TABLET ORAL at 21:12

## 2022-05-13 RX ADMIN — SODIUM CHLORIDE, PRESERVATIVE FREE 10 ML: 5 INJECTION INTRAVENOUS at 09:02

## 2022-05-13 ASSESSMENT — PAIN SCALES - GENERAL
PAINLEVEL_OUTOF10: 0

## 2022-05-13 NOTE — PROGRESS NOTES
Progress Note( Dr. Rishi Reynolds)  5/12/2022  Subjective:   Admit Date: 5/2/2022  PCP: Brian Irizarry MD    Admitted For : Shortness of breath with right-sided pleural effusion    Consulted For:  Better control of blood glucose      Interval History: Patient is sedated, intubated and ventilator  Continue to be holding off tube feeding as he still has high residual  Started on Reglan bowel movements get better        Intake/Output Summary (Last 24 hours) at 5/12/2022 2205  Last data filed at 5/12/2022 1800  Gross per 24 hour   Intake 100 ml   Output 1275 ml   Net -1175 ml       DATA    CBC:   Recent Labs     05/11/22  1030 05/12/22  0530   WBC 13.6* 12.5*   HGB 13.4* 13.3*    208    CMP:  Recent Labs     05/10/22  0602 05/10/22  1215 05/10/22  1805 05/11/22  0515 05/12/22  0530     --  139 138 144   K 3.4*   < > 4.4 3.5 4.4     --  102 101 104   CO2 26  --  26 27 26   BUN 25*  --   --  28* 33*   CREATININE 1.3  --   --  1.5* 1.7*   CALCIUM 8.9  --   --  8.5 9.2    < > = values in this interval not displayed. Lipids:   Lab Results   Component Value Date    CHOL 174 05/07/2022    CHOL 230 10/14/2019    HDL 28 05/07/2022    TRIG 130 05/07/2022     Glucose:  Recent Labs     05/12/22  0531 05/12/22  1201 05/12/22  1637   POCGLU 93 92 129*     [id number]:  Lab Results   Component Value Date    LABA1C 6.1 08/09/2021     High Sensitivity TSH:   Lab Results   Component Value Date    TSHHS 0.900 05/05/2022     Free T3: No results found for: FT3  Free T4:  Lab Results   Component Value Date    T4FREE 0.78 03/29/2022       XR CHEST PORTABLE   Final Result   Patient is status post thoracentesis without discrete pneumothorax. Intervally removed enteric and endotracheal tubes. Similar opacification of the right hemithorax. US CHEST INCLUDING MEDIASTINUM   Final Result   Large right pleural effusion.          XR CHEST PORTABLE   Final Result   Complete opacification of right hemithorax which appears slightly progressed   in comparison to 05/10/2022, with decreased aeration at the right lung apex. As previously, this may represent a combination of layering pleural fluid,   atelectasis, and/or airspace disease      Similar small left pleural effusion. No significant interval change in tube and line position. XR CHEST PORTABLE   Final Result   Diffuse opacities are seen within the right lung. Findings could be related   to layering of a right pleural effusion and adjacent atelectasis/infiltrate. This is not significantly changed. Pulmonary vascular congestion. Tip of the ET tube is 2.5 cm above the berny. CT HEAD WO CONTRAST   Final Result   No acute intracranial abnormality. US CHEST INCLUDING MEDIASTINUM   Final Result   Right pleural effusion. XR CHEST PORTABLE   Final Result   Increased hazy opacification of the right lung, favored to represent a   combination of progressed atelectasis and layering pleural fluid. Progressive underline airspace disease such as pneumonia is not excluded. Endotracheal tube appears retracted, now measuring about 7.3 cm above the   berny. This may be related in part to patient position. XR CHEST PORTABLE   Final Result   No interval change of right-sided airspace opacity and probable bilateral   pleural effusions. Endotracheal tube tip is 3.3 cm above the berny. XR ABDOMEN (KUB) (SINGLE AP VIEW)   Final Result   No radiopaque urinary collecting system calculus evident. Unremarkable bowel gas pattern. Unremarkable fecal burden. XR CHEST PORTABLE   Final Result   Diffuse opacities are seen within the right lung without significant change. Tip of the ET tube is 5.9 cm above the berny. XR CHEST PORTABLE   Final Result   Bilateral pleural effusions and airspace opacities. Endotracheal tube tip is approximately 5 cm above the berny.          XR CHEST PORTABLE Final Result   Improved aeration of the right lung field compared to 05/04/2022 with   persistent large areas of increased opacification right apical and right   mid-basilar regions consistent with underlying pulmonary consolidation,   mass/neoplasm, residual pleural fluid or thickening or asymmetric elevation   of left hemidiaphragm. No pneumothorax or procedure related complication   suggested      Consolidating infiltrate and/or moderate-sized pleural effusion left basilar   region. Stable borderline cardiomegaly, satisfactory position of nasogastric and   endotracheal tubes, soft tissue density right paratracheal region consistent   with mass/neoplasm, lymphadenopathy or area pulmonary consolidation measuring   5.5 cm in greatest cephalocaudal dimension. US CHEST INCLUDING MEDIASTINUM   Final Result   Ultrasound guidance provided for right thoracentesis. XR CHEST PORTABLE   Final Result   Near complete opacification of the right hemithorax with mildly improved   aeration. Mildly increased left-sided interstitial opacities favoring interstitial   edema. Unchanged position of support devices. CT ABDOMEN PELVIS WO CONTRAST Additional Contrast? None   Final Result   Complete opacification of the right hemithorax with a large right pleural   effusion, with compressive atelectasis of the right lung, and shift of the   mediastinum to the left. Minor atelectasis noted in the left lung base. Large amount of ascites. Evidence of the cirrhosis. Stable left renal cyst.      Stable mesenteric and retroperitoneal lymphadenopathy without change since   2019. XR ABDOMEN FOR NG/OG/NE TUBE PLACEMENT   Final Result   Satisfactory position lines and catheters. Right sided chest opacification. Question extrinsic to the patient         [address]   Final Result   Large right pleural effusion. CT HEAD WO CONTRAST   Final Result   1.  Motion compromised study but no acute intracranial abnormality. I would   suggest follow-up if symptoms persist.         IR US GUIDED PARACENTESIS   Final Result   Successful ultrasound-guided paracentesis with specimen sent for laboratory   evaluation. US DUP ABD PEL RETRO SCROT COMPLETE   Final Result   1. Portal and hepatic veins appear grossly patent. 2. Cirrhosis without focal liver lesion demonstrated. 3. Small volume ascites. XR CHEST PORTABLE   Final Result   Significant progression of opacification of the right hemithorax, which is   now complete. This likely represent progressed pleural effusion and   atelectasis. Likely trace left pleural effusion, similar to prior         XR CHEST PORTABLE   Final Result   1. Status post right thoracentesis with new partial aeration of the right   lung. Large pleural effusion persists. 2. Small left pleural effusion. IR GUIDED THORACENTESIS PLEURAL   Final Result   Successful ultrasound guided right thoracentesis with specimen sent for   laboratory evaluation. .         XR CHEST PORTABLE   Final Result   Complete opacification of the right hemithorax likely due to a large right   pleural effusion and atelectasis.          CTA CHEST ABDOMEN PELVIS W CONTRAST    (Results Pending)        Scheduled Medicines   Medications:    furosemide  40 mg IntraVENous BID    sodium chloride  1 g Oral TID    insulin regular  0-12 Units SubCUTAneous Q6H    enoxaparin  40 mg SubCUTAneous Daily    lidocaine PF  5 mL IntraDERmal Once    sodium chloride flush  5-40 mL IntraVENous 2 times per day    DULoxetine  60 mg Oral Daily    lactulose  20 g Oral TID    levothyroxine  50 mcg Oral Daily    magnesium oxide  200 mg Oral BID    [Held by provider] nadolol  40 mg Oral Daily    rifAXIMin  550 mg Oral BID    spironolactone  100 mg Oral Daily      Infusions:    sodium chloride Stopped (05/06/22 1431)    norepinephrine Stopped (05/11/22 1030)    sodium chloride Stopped (05/10/22 0144)    sodium chloride           Objective:   Vitals: BP (!) 165/69   Pulse 83   Temp 98.2 °F (36.8 °C) (Oral)   Resp 20   Ht 5' 7.99\" (1.727 m)   Wt 285 lb (129.3 kg)   SpO2 95%   BMI 43.34 kg/m²   General appearance: Patient is sedated, intubated and on ventilator also NG tube feeding  Neck: no JVD or bruit  Thyroid : Normal lobes   Lungs: Has Vesicular Breath sounds related diminished breath sound right-sided pleural effusion was aspirated earlier but it and again reaccumulated  Heart:  regular rate and rhythm  Abdomen: soft, non-tender; bowel sounds normal; no masses,  no organomegaly  Musculoskeletal: Normal  Extremities: extremities normal, , no edema  Neurologic:  Patient is sedated, intubated and on ventilator . Assessment:     Patient Active Problem List:     Shortness of breath     History of colonic polyps     Alcoholic cirrhosis of liver with ascites (HCC)-Dr Jacques     Mixed hyperlipidemia     Hypertension     History of alcohol abuse     Gastroesophageal reflux disease without esophagitis     Anxiety     Pulmonary nodules     Hyperglycemia     Acquired hypothyroidism     Class 3 severe obesity with body mass index (BMI) of 40.0 to 44.9 in adult [facility])     Bilateral hearing loss     Increased ammonia level     JENNIFER (obstructive sleep apnea)     Portal hypertension (HCC)     SOB (shortness of breath)     Cirrhosis of liver with ascites (HCC)     Secondary esophageal varices without bleeding (HCC)     Gastric polyps     Hearing loss     Sepsis (HCC)     Hepatic encephalopathy (HCC)     Pleural effusion     Acute on chronic respiratory failure with hypoxemia (HCC)     Acute respiratory failure ([address]      Plan:     1. Reviewed POC blood glucose . Labs and X ray results   2. Reviewed Current Medicines   3. On correction bolus of regular insulin  4. Monitor Blood glucose frequently   5. Modified  the dose of Insulin/ other medicines as needed  6.  Tube feeding is on hold  7. If unable to tolerate tube feeding should consider starting him on TPN  8. Will follow     .      Alfonso Faulkner MD, MD

## 2022-05-13 NOTE — PROGRESS NOTES
Attempted to wean off patient's oxygen at 0105. At 0110 patient's oxygen saturation dropped to 87%. 1L nasal canula was re-applied at this time, and patient's oxygen saturation improved to 91% by 0112.

## 2022-05-13 NOTE — PROGRESS NOTES
Patient given medications crushed up in pudding. Patient was able to swallow medications easily, but started coughing very shortly after swallowing despite approved diet by speech therapy. For safety reasons patient will remain NPO tonight, and information will be passed to dayshift for possible re-evaluation of diet orders.

## 2022-05-13 NOTE — PROGRESS NOTES
Progress Note( Dr. Meme Lyon)    Subjective:   Admit Date: 5/2/2022  PCP: Valdo Dodson MD    Admitted For : Shortness of breath with right-sided pleural effusion    Consulted For:  Better control of blood glucose      Interval History: Patient is extubated and off ventilator  Continue to be holding off tube feeding as he still has high residual      Bilateral pleural effusion. Right and left lung had hydrothorax and right sided lung was aspirated, thoracentesis done    Started on Reglan bowel movements get better  Going to have swallow evaluation swal        Intake/Output Summary (Last 24 hours) at 5/12/2022 2203  Last data filed at 5/12/2022 1800  Gross per 24 hour   Intake 100 ml   Output 1275 ml   Net -1175 ml       DATA    CBC:   Recent Labs     05/11/22  1030 05/12/22  0530   WBC 13.6* 12.5*   HGB 13.4* 13.3*    208    CMP:  Recent Labs     05/10/22  0602 05/10/22  1215 05/10/22  1805 05/11/22  0515 05/12/22  0530     --  139 138 144   K 3.4*   < > 4.4 3.5 4.4     --  102 101 104   CO2 26  --  26 27 26   BUN 25*  --   --  28* 33*   CREATININE 1.3  --   --  1.5* 1.7*   CALCIUM 8.9  --   --  8.5 9.2    < > = values in this interval not displayed. Lipids:   Lab Results   Component Value Date    CHOL 174 05/07/2022    CHOL 230 10/14/2019    HDL 28 05/07/2022    TRIG 130 05/07/2022     Glucose:  Recent Labs     05/12/22  0531 05/12/22  1201 05/12/22  1637   POCGLU 93 92 129*     [id number]:  Lab Results   Component Value Date    LABA1C 6.1 08/09/2021     High Sensitivity TSH:   Lab Results   Component Value Date    TSHHS 0.900 05/05/2022     Free T3: No results found for: FT3  Free T4:  Lab Results   Component Value Date    T4FREE 0.78 03/29/2022       XR CHEST PORTABLE   Final Result   Patient is status post thoracentesis without discrete pneumothorax. Intervally removed enteric and endotracheal tubes. Similar opacification of the right hemithorax.          US CHEST INCLUDING MEDIASTINUM   Final Result   Large right pleural effusion. XR CHEST PORTABLE   Final Result   Complete opacification of right hemithorax which appears slightly progressed   in comparison to 05/10/2022, with decreased aeration at the right lung apex. As previously, this may represent a combination of layering pleural fluid,   atelectasis, and/or airspace disease      Similar small left pleural effusion. No significant interval change in tube and line position. XR CHEST PORTABLE   Final Result   Diffuse opacities are seen within the right lung. Findings could be related   to layering of a right pleural effusion and adjacent atelectasis/infiltrate. This is not significantly changed. Pulmonary vascular congestion. Tip of the ET tube is 2.5 cm above the berny. CT HEAD WO CONTRAST   Final Result   No acute intracranial abnormality. US CHEST INCLUDING MEDIASTINUM   Final Result   Right pleural effusion. XR CHEST PORTABLE   Final Result   Increased hazy opacification of the right lung, favored to represent a   combination of progressed atelectasis and layering pleural fluid. Progressive underline airspace disease such as pneumonia is not excluded. Endotracheal tube appears retracted, now measuring about 7.3 cm above the   berny. This may be related in part to patient position. XR CHEST PORTABLE   Final Result   No interval change of right-sided airspace opacity and probable bilateral   pleural effusions. Endotracheal tube tip is 3.3 cm above the berny. XR ABDOMEN (KUB) (SINGLE AP VIEW)   Final Result   No radiopaque urinary collecting system calculus evident. Unremarkable bowel gas pattern. Unremarkable fecal burden. XR CHEST PORTABLE   Final Result   Diffuse opacities are seen within the right lung without significant change. Tip of the ET tube is 5.9 cm above the berny.          XR CHEST PORTABLE Final Result   Bilateral pleural effusions and airspace opacities. Endotracheal tube tip is approximately 5 cm above the berny. XR CHEST PORTABLE   Final Result   Improved aeration of the right lung field compared to 05/04/2022 with   persistent large areas of increased opacification right apical and right   mid-basilar regions consistent with underlying pulmonary consolidation,   mass/neoplasm, residual pleural fluid or thickening or asymmetric elevation   of left hemidiaphragm. No pneumothorax or procedure related complication   suggested      Consolidating infiltrate and/or moderate-sized pleural effusion left basilar   region. Stable borderline cardiomegaly, satisfactory position of nasogastric and   endotracheal tubes, soft tissue density right paratracheal region consistent   with mass/neoplasm, lymphadenopathy or area pulmonary consolidation measuring   5.5 cm in greatest cephalocaudal dimension. US CHEST INCLUDING MEDIASTINUM   Final Result   Ultrasound guidance provided for right thoracentesis. XR CHEST PORTABLE   Final Result   Near complete opacification of the right hemithorax with mildly improved   aeration. Mildly increased left-sided interstitial opacities favoring interstitial   edema. Unchanged position of support devices. CT ABDOMEN PELVIS WO CONTRAST Additional Contrast? None   Final Result   Complete opacification of the right hemithorax with a large right pleural   effusion, with compressive atelectasis of the right lung, and shift of the   mediastinum to the left. Minor atelectasis noted in the left lung base. Large amount of ascites. Evidence of the cirrhosis. Stable left renal cyst.      Stable mesenteric and retroperitoneal lymphadenopathy without change since   2019. XR ABDOMEN FOR NG/OG/NE TUBE PLACEMENT   Final Result   Satisfactory position lines and catheters. Right sided chest opacification.   Question extrinsic to the patient         [address]   Final Result   Large right pleural effusion. CT HEAD WO CONTRAST   Final Result   1. Motion compromised study but no acute intracranial abnormality. I would   suggest follow-up if symptoms persist.         IR US GUIDED PARACENTESIS   Final Result   Successful ultrasound-guided paracentesis with specimen sent for laboratory   evaluation. US DUP ABD PEL RETRO SCROT COMPLETE   Final Result   1. Portal and hepatic veins appear grossly patent. 2. Cirrhosis without focal liver lesion demonstrated. 3. Small volume ascites. XR CHEST PORTABLE   Final Result   Significant progression of opacification of the right hemithorax, which is   now complete. This likely represent progressed pleural effusion and   atelectasis. Likely trace left pleural effusion, similar to prior         XR CHEST PORTABLE   Final Result   1. Status post right thoracentesis with new partial aeration of the right   lung. Large pleural effusion persists. 2. Small left pleural effusion. IR GUIDED THORACENTESIS PLEURAL   Final Result   Successful ultrasound guided right thoracentesis with specimen sent for   laboratory evaluation. .         XR CHEST PORTABLE   Final Result   Complete opacification of the right hemithorax likely due to a large right   pleural effusion and atelectasis.          CTA CHEST ABDOMEN PELVIS W CONTRAST    (Results Pending)        Scheduled Medicines   Medications:    furosemide  40 mg IntraVENous BID    sodium chloride  1 g Oral TID    insulin regular  0-12 Units SubCUTAneous Q6H    enoxaparin  40 mg SubCUTAneous Daily    lidocaine PF  5 mL IntraDERmal Once    sodium chloride flush  5-40 mL IntraVENous 2 times per day    DULoxetine  60 mg Oral Daily    lactulose  20 g Oral TID    levothyroxine  50 mcg Oral Daily    magnesium oxide  200 mg Oral BID    [Held by provider] nadolol  40 mg Oral Daily    rifAXIMin  550 mg Oral BID    spironolactone  100 mg Oral Daily      Infusions:    sodium chloride Stopped (05/06/22 1431)    norepinephrine Stopped (05/11/22 1030)    sodium chloride Stopped (05/10/22 0144)    sodium chloride           Objective:   Vitals: BP (!) 165/69   Pulse 83   Temp 98.2 °F (36.8 °C) (Oral)   Resp 20   Ht 5' 7.99\" (1.727 m)   Wt 285 lb (129.3 kg)   SpO2 95%   BMI 43.34 kg/m²   General appearance: Patient is extubated and off ventilator 5/11/2022  Neck: no JVD or bruit  Thyroid : Normal lobes   Lungs: Has Vesicular Breath sounds related diminished breath sound right-sided pleural effusion was aspirated earlier but it and again reaccumulated  Heart:  regular rate and rhythm  Abdomen: soft, non-tender; bowel sounds normal; no masses,  no organomegaly  Musculoskeletal: Normal  Extremities: extremities normal, , no edema  Neurologic:  Patient is extubated and off ventilator still very drowsy a  Assessment:     Patient Active Problem List:     Shortness of breath     History of colonic polyps     Alcoholic cirrhosis of liver with ascites (HCC)-Dr Jacques     Mixed hyperlipidemia     Hypertension     History of alcohol abuse     Gastroesophageal reflux disease without esophagitis     Anxiety     Pulmonary nodules     Hyperglycemia     Acquired hypothyroidism     Class 3 severe obesity with body mass index (BMI) of 40.0 to 44.9 in adult [facility])     Bilateral hearing loss     Increased ammonia level     JENNIFER (obstructive sleep apnea)     Portal hypertension (HCC)     SOB (shortness of breath)     Cirrhosis of liver with ascites (HCC)     Secondary esophageal varices without bleeding (HCC)     Gastric polyps     Hearing loss     Sepsis (HCC)     Hepatic encephalopathy (HCC)     Pleural effusion     Acute on chronic respiratory failure with hypoxemia (HCC)     Acute respiratory failure (HCC)      Plan:     1. Reviewed POC blood glucose . Labs and X ray results   2. Reviewed Current Medicines   3.  On correction bolus of regular insulin  4. Monitor Blood glucose frequently   5. Modified  the dose of Insulin/ other medicines as needed  6. Tube feeding is on hold  7. If unable to tolerate tube feeding should consider starting him on TPN  8. Will follow     .      Shannan Alegria MD, MD

## 2022-05-13 NOTE — CARE COORDINATION
Received referral for ARU. Will review patients clinicals and PT/OT notes. Thank you for the referral.     1400:  Met with patient with 1:1 sitter at bedside. Patient pleasantly confused. Patient talking about a \"pool the size of [place]\" being in his room. ARU will continue to follow for improvement in his cognition and continuous participation with therapy. Tried calling patients wife Anibal Palmer) to discuss ARU. There was no answer and states inbox for 's is not set up for the phone.

## 2022-05-13 NOTE — PLAN OF CARE
Problem: Discharge Planning  Goal: Discharge to home or other facility with appropriate resources  Outcome: Progressing  Flowsheets  Taken 5/12/2022 2120  Discharge to home or other facility with appropriate resources:   Identify barriers to discharge with patient and caregiver   Arrange for needed discharge resources and transportation as appropriate   Identify discharge learning needs (meds, wound care, etc)   Refer to discharge planning if patient needs post-hospital services based on physician order or complex needs related to functional status, cognitive ability or social support system  Taken 5/12/2022 1948  Discharge to home or other facility with appropriate resources:   Identify barriers to discharge with patient and caregiver   Arrange for needed discharge resources and transportation as appropriate   Refer to discharge planning if patient needs post-hospital services based on physician order or complex needs related to functional status, cognitive ability or social support system   Identify discharge learning needs (meds, wound care, etc)     Problem: Pain  Goal: Verbalizes/displays adequate comfort level or baseline comfort level  Outcome: Progressing  Flowsheets  Taken 5/13/2022 0000  Verbalizes/displays adequate comfort level or baseline comfort level:   Encourage patient to monitor pain and request assistance   Assess pain using appropriate pain scale   Implement non-pharmacological measures as appropriate and evaluate response  Taken 5/12/2022 2300  Verbalizes/displays adequate comfort level or baseline comfort level:   Encourage patient to monitor pain and request assistance   Assess pain using appropriate pain scale   Implement non-pharmacological measures as appropriate and evaluate response  Taken 5/12/2022 2200  Verbalizes/displays adequate comfort level or baseline comfort level:   Encourage patient to monitor pain and request assistance   Assess pain using appropriate pain scale   Implement non-pharmacological measures as appropriate and evaluate response  Taken 5/12/2022 2100  Verbalizes/displays adequate comfort level or baseline comfort level:   Encourage patient to monitor pain and request assistance   Assess pain using appropriate pain scale   Implement non-pharmacological measures as appropriate and evaluate response     Problem: Nutrition Deficit:  Goal: Optimize nutritional status  Outcome: Progressing     Problem: Safety - Adult  Goal: Free from fall injury  Outcome: Progressing  Flowsheets (Taken 5/13/2022 0000)  Free From Fall Injury: Instruct family/caregiver on patient safety     Problem: ABCDS Injury Assessment  Goal: Absence of physical injury  Outcome: Progressing  Flowsheets (Taken 5/13/2022 0000)  Absence of Physical Injury: Implement safety measures based on patient assessment     Problem: Skin/Tissue Integrity  Goal: Absence of new skin breakdown  Description: 1. Monitor for areas of redness and/or skin breakdown  2. Assess vascular access sites hourly  3. Every 4-6 hours minimum:  Change oxygen saturation probe site  4. Every 4-6 hours:  If on nasal continuous positive airway pressure, respiratory therapy assess nares and determine need for appliance change or resting period.   Outcome: Progressing

## 2022-05-13 NOTE — PROGRESS NOTES
Pt is transferred to the unit, settled in, sitter at bedside. Pt disoriented and confused about his situation. Pt reoriented and offered support, wife at bedside, agreeable to plan. Pt has silva, output good and pt has good oral intake with thickened water. Pt has alarm on for safety, all needs met, pt denies complaints.

## 2022-05-13 NOTE — PLAN OF CARE
Problem: Discharge Planning  Goal: Discharge to home or other facility with appropriate resources  5/13/2022 0922 by Erik De Santiago RN  Outcome: Progressing  5/13/2022 0006 by Hannah Hallman RN  Outcome: Progressing  Flowsheets  Taken 5/12/2022 2120  Discharge to home or other facility with appropriate resources:   Identify barriers to discharge with patient and caregiver   Arrange for needed discharge resources and transportation as appropriate   Identify discharge learning needs (meds, wound care, etc)   Refer to discharge planning if patient needs post-hospital services based on physician order or complex needs related to functional status, cognitive ability or social support system  Taken 5/12/2022 1948  Discharge to home or other facility with appropriate resources:   Identify barriers to discharge with patient and caregiver   Arrange for needed discharge resources and transportation as appropriate   Refer to discharge planning if patient needs post-hospital services based on physician order or complex needs related to functional status, cognitive ability or social support system   Identify discharge learning needs (meds, wound care, etc)     Problem: Pain  Goal: Verbalizes/displays adequate comfort level or baseline comfort level  5/13/2022 0922 by Erik De Santiago RN  Outcome: Progressing  Flowsheets  Taken 5/13/2022 0701 by Hannah Hallman RN  Verbalizes/displays adequate comfort level or baseline comfort level:   Encourage patient to monitor pain and request assistance   Assess pain using appropriate pain scale   Implement non-pharmacological measures as appropriate and evaluate response  Taken 5/13/2022 0603 by Hannah Hallman RN  Verbalizes/displays adequate comfort level or baseline comfort level:   Encourage patient to monitor pain and request assistance   Assess pain using appropriate pain scale   Implement non-pharmacological measures as appropriate and evaluate response  Taken 5/13/2022 0505 by Mejia Evans RN  Verbalizes/displays adequate comfort level or baseline comfort level:   Encourage patient to monitor pain and request assistance   Assess pain using appropriate pain scale   Implement non-pharmacological measures as appropriate and evaluate response  Taken 5/13/2022 0415 by Mejia Evans RN  Verbalizes/displays adequate comfort level or baseline comfort level:   Encourage patient to monitor pain and request assistance   Assess pain using appropriate pain scale   Implement non-pharmacological measures as appropriate and evaluate response  Taken 5/13/2022 0252 by Mejia Evans RN  Verbalizes/displays adequate comfort level or baseline comfort level:   Encourage patient to monitor pain and request assistance   Assess pain using appropriate pain scale   Implement non-pharmacological measures as appropriate and evaluate response  Taken 5/13/2022 0205 by Mejia Evans RN  Verbalizes/displays adequate comfort level or baseline comfort level:   Encourage patient to monitor pain and request assistance   Assess pain using appropriate pain scale   Implement non-pharmacological measures as appropriate and evaluate response  5/13/2022 0006 by Mejia Evans RN  Outcome: Progressing  Flowsheets  Taken 5/13/2022 0000  Verbalizes/displays adequate comfort level or baseline comfort level:   Encourage patient to monitor pain and request assistance   Assess pain using appropriate pain scale   Implement non-pharmacological measures as appropriate and evaluate response  Taken 5/12/2022 2300  Verbalizes/displays adequate comfort level or baseline comfort level:   Encourage patient to monitor pain and request assistance   Assess pain using appropriate pain scale   Implement non-pharmacological measures as appropriate and evaluate response  Taken 5/12/2022 2200  Verbalizes/displays adequate comfort level or baseline comfort level:   Encourage patient to monitor pain and request assistance   Assess pain using appropriate pain scale   Implement non-pharmacological measures as appropriate and evaluate response  Taken 5/12/2022 2100  Verbalizes/displays adequate comfort level or baseline comfort level:   Encourage patient to monitor pain and request assistance   Assess pain using appropriate pain scale   Implement non-pharmacological measures as appropriate and evaluate response     Problem: Nutrition Deficit:  Goal: Optimize nutritional status  5/13/2022 0922 by Wil Breaux RN  Outcome: Progressing  5/13/2022 0006 by Minh Almeida RN  Outcome: Progressing     Problem: Safety - Adult  Goal: Free from fall injury  5/13/2022 0922 by Wil Breaux RN  Outcome: Progressing  5/13/2022 0006 by Minh Almeida RN  Outcome: Progressing  Flowsheets (Taken 5/13/2022 0000)  Free From Fall Injury: Instruct family/caregiver on patient safety     Problem: ABCDS Injury Assessment  Goal: Absence of physical injury  5/13/2022 9669 by Wil Breaux RN  Outcome: Progressing  Flowsheets (Taken 5/13/2022 0920)  Absence of Physical Injury: Implement safety measures based on patient assessment  5/13/2022 0006 by Minh Almeida RN  Outcome: Progressing  Flowsheets (Taken 5/13/2022 0000)  Absence of Physical Injury: Implement safety measures based on patient assessment     Problem: Skin/Tissue Integrity  Goal: Absence of new skin breakdown  Description: 1. Monitor for areas of redness and/or skin breakdown  2. Assess vascular access sites hourly  3. Every 4-6 hours minimum:  Change oxygen saturation probe site  4. Every 4-6 hours:  If on nasal continuous positive airway pressure, respiratory therapy assess nares and determine need for appliance change or resting period.   5/13/2022 7157 by Wil Breaux RN  Outcome: Progressing  5/13/2022 0006 by Minh Almeida RN  Outcome: Progressing

## 2022-05-13 NOTE — PROGRESS NOTES
CARDIOLOGY  NOTE        Name:  Moreno Cooper /Age/Sex: 1945  (78 y.o. male)   MRN & CSN:  [id number] & [id number] Admission Date/Time: 2022 12:00 PM   Location:  -A PCP: Jeremy Coronado, [address] Day: 12        PLAN FROM CARDIOLOGY FOR TODAY:   Continue present management    - cardiology consult is for: Possible CHF    -  Interval history: Extubated is very alert now    · ASSESSMENT/ PLAN:  1. Recurrent pleural effusions per pulmonary might benefit from pleurodesis  2. Cirrhosis per GI  3. Echo with normal EF  4. Morbid obesity with a BMI of 43.34 suggestive of pickwickian syndrome  5. We will continue supportive care  6. We will follow-up as needed          Subjective: Todays complain: Patient extubated and alert    HPI:  Melania Torres is a 78 y. o.year old who and presents with had concerns including Shortness of Breath (reports difficulty breathing for the past 5-6 days and extremely tired). Chief Complaint   Patient presents with    Shortness of Breath     reports difficulty breathing for the past 5-6 days and extremely tired           Objective: Temperature:  Current - Temp: 97.8 °F (36.6 °C);  Max - Temp  Av.4 °F (36.9 °C)  Min: 97.8 °F (36.6 °C)  Max: 99.3 °F (37.4 °C)    Respiratory Rate : Resp  Av  Min: 14  Max: 33    Pulse Range: Pulse  Av.6  Min: 80  Max: 103    Blood Presuure Range:  Systolic (26AOS), VKS:128 , Min:123 , DTU:348   ; Diastolic (73SPT), WPK:33, Min:65, Max:101      Pulse ox Range: SpO2  Av.6 %  Min: 87 %  Max: 99 %    24hr I & O:      Intake/Output Summary (Last 24 hours) at 2022 0934  Last data filed at 2022 0900  Gross per 24 hour   Intake 450 ml   Output 1550 ml   Net -1100 ml         BP (!) 165/70   Pulse 103   Temp 97.8 °F (36.6 °C) (Oral)   Resp 17   Ht 5' 8\" (1.727 m)   Wt 238 lb 12.1 oz (108.3 kg)   SpO2 97%   BMI 36.30 kg/m²           Review of Systems:    Negative  TELEMETRY: Sinus    has a past medical history of Anxiety, Cirrhosis, alcoholic ([address] GERD (gastroesophageal reflux disease), GERD (gastroesophageal reflux disease), Hyperlipidemia, Hypertension, Portal hypertension ([address] Pulmonary nodules, Sleep apnea, and SOB (shortness of breath). has a past surgical history that includes Cholecystectomy; Vasectomy; Colonoscopy; Endoscopy, colon, diagnostic; Foot surgery; and Upper gastrointestinal endoscopy (N/A, 1/19/2022). Physical Exam:  General: Extubated  Head:normal  Eye: Pupils equal and round  Neck:  No JVD, no carotid bruit noted   Chest:  Clear to auscultation, no signs of respiratory distress  Cardiovascular:  Normal rate and rhythm. S1 and S2 noted.  No murmurs rubs or gallops  Abdomen:   nontender  Extremities:  +2 edema  Pulses; palpable  Neuro: Extubated alert    Medications:    furosemide  40 mg IntraVENous BID    sodium chloride  1 g Oral TID    insulin regular  0-12 Units SubCUTAneous Q6H    enoxaparin  40 mg SubCUTAneous Daily    lidocaine PF  5 mL IntraDERmal Once    sodium chloride flush  5-40 mL IntraVENous 2 times per day    DULoxetine  60 mg Oral Daily    lactulose  20 g Oral TID    levothyroxine  50 mcg Oral Daily    magnesium oxide  200 mg Oral BID    [Held by provider] nadolol  40 mg Oral Daily    rifAXIMin  550 mg Oral BID    spironolactone  100 mg Oral Daily      sodium chloride Stopped (05/06/22 1431)    norepinephrine Stopped (05/11/22 1030)    sodium chloride Stopped (05/10/22 0144)    sodium chloride       potassium chloride **OR** potassium chloride, fentaNYL, magnesium sulfate, sodium chloride flush, sodium chloride, sodium phosphate IVPB **OR** sodium phosphate IVPB, sodium chloride flush, sodium chloride, albuterol sulfate HFA, sodium chloride flush, sodium chloride, nicotine polacrilex, acetaminophen **OR** acetaminophen, ondansetron, ALPRAZolam    Lab Data:  CBC:   Recent Labs     05/11/22  1030 05/12/22 0530 05/13/22 0357   WBC 13.6* 12.5* 10.8*   HGB 13.4* 13.3* 12.6*   HCT 42.0 41.2* 38.4*   MCV 99.1 100.0 97.7    208 179     BMP:   Recent Labs     05/11/22 0515 05/12/22 0530 05/13/22 0357    144 144   K 3.5 4.4 4.0    104 105   CO2 27 26 29   PHOS 3.1 4.1 2.9   BUN 28* 33* 37*   CREATININE 1.5* 1.7* 1.7*     LIVER PROFILE:   No results for input(s): AST, ALT, LIPASE, BILIDIR, BILITOT, ALKPHOS in the last 72 hours. Invalid input(s): AMYLASE,  ALB  PT/INR:   Recent Labs     05/11/22 0515 05/12/22 0530 05/13/22 0357   PROTIME 14.0 14.4 14.6*   INR 1.08 1.12 1.13     APTT: No results for input(s): APTT in the last 72 hours. BNP:  No results for input(s): BNP in the last 72 hours. TROPONIN: No results for input(s): TROPONINI in the last 72 hours. No results for input(s): TROPONINT in the last 72 hours. Labs, consult, tests reviewed                    Holly Velasco MD, PA-C 5/13/2022 9:34 AM     Please note this report has been partially produced using speech recognition software and may contain errors related to that system including errors in grammar, punctuation, and spelling, as well as words and phrases that may be inappropriate. If there are any questions or concerns please feel free to contact the dictating provider for clarification.

## 2022-05-13 NOTE — PROGRESS NOTES
Pulmonary and Critical Care  Progress Note      VITALS:  BP (!) 165/70   Pulse 83   Temp 97.8 °F (36.6 °C) (Oral)   Resp 20   Ht 5' 8\" (1.727 m)   Wt 238 lb 12.1 oz (108.3 kg)   SpO2 97%   BMI 36.30 kg/m²     Subjective:   CHIEF COMPLAINT :SOB     HPI:                The patient is lying in the bed. He is little confused,but he is not in acute resp distress    Objective:   PHYSICAL EXAM:    LUNGS:decreased air entry right base  Abd-distended, BS+,NT  Ext- no pedal edema  CVS-s1s2, no murmurs      DATA:    CBC:  Recent Labs     05/11/22  1030 05/12/22  0530 05/13/22  0357   WBC 13.6* 12.5* 10.8*   RBC 4.24* 4.12* 3.93*   HGB 13.4* 13.3* 12.6*   HCT 42.0 41.2* 38.4*    208 179   MCV 99.1 100.0 97.7   MCH 31.6* 32.3* 32.1*   MCHC 31.9* 32.3 32.8   RDW 14.9 15.1* 14.7   SEGSPCT 72.1* 69.3* 66.0      BMP:  Recent Labs     05/11/22  0515 05/12/22  0530 05/13/22  0357    144 144   K 3.5 4.4 4.0    104 105   CO2 27 26 29   BUN 28* 33* 37*   CREATININE 1.5* 1.7* 1.7*   CALCIUM 8.5 9.2 9.6   GLUCOSE 146* 105* 125*      ABG:  Recent Labs     05/11/22  0600   PH 7.50*   PO2ART 66*   LGB4RLN 35.0   O2SAT 91.4*     BNP  No results found for: BNP   D-Dimer:  Lab Results   Component Value Date    DDIMER 1606 (H) 09/13/2021      1.  Radiology: None      Assessment/Plan     Patient Active Problem List    Diagnosis Date Noted    Acute respiratory failure ([facility] 05/04/2022     Priority: Medium    Acute on chronic respiratory failure with hypoxemia (HCC)      Priority: Medium    Pleural effusion 05/02/2022     Priority: Medium    [facility]eps[facility] 03/19/2022    Hepatic encephalopathy ([facility] 03/19/2022    Hearing loss 03/11/2022    Cirrhosis of liver with ascites ([facility]     Secondary esophageal varices without bleeding ([facility]     Gastric polyps     SOB (shortness of breath) 09/13/2021    Portal hypertension ([facility] 08/30/2021    JENNIFER (obstructive sleep apnea) 08/09/2021    Bilateral hearing loss 05/07/2021    Increased ammonia level 05/07/2021    Class 3 severe obesity with body mass index (BMI) of 40.0 to 44.9 in adult [facility]) 09/21/2020    Hyperglycemia 12/04/2019    Acquired hypothyroidism 12/04/2019    Pulmonary nodules 91/13/3621    Alcoholic cirrhosis of liver with ascites (HCC)-Dr Jacques 08/08/2019    Mixed hyperlipidemia 08/08/2019    Hypertension 08/08/2019    History of alcohol abuse 08/08/2019    Gastroesophageal reflux disease without esophagitis 08/08/2019    Anxiety 08/08/2019    Shortness of breath 05/26/2019    History of colonic polyps 01/15/2019   Acute Hypoxic resp failure  Recurrent Right pleural effusion 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 JENNIFER  VDRF  Toxic Metabolic encephalopathy- improving  Moderate Malnutrition       1. Diuresis  2. Inhalers  3. ICS  4. OOB  5. PT/OT  6. Advance diet as tolerated  7. Keep sats > 92%  8.  C.w present management    Electronically signed by Janki Cummings MD on 5/13/2022 at 9:02 AM

## 2022-05-13 NOTE — PROGRESS NOTES
All belongings accounted by wife for upon transfer to [address] [place] [address]. [facility] 2026

## 2022-05-13 NOTE — PROGRESS NOTES
Occupational Therapy  . Occupational Therapy Treatment Note  Name: Raul Molina MRN: [id number] :   1945   Date:  2022   Admission Date: 2022 Room:  -A     Pt would benefit from continued acute care OT services w/ discharge to ARU    Primary Problem:    Restrictions/Precautions:  Restrictions/Precautions  Restrictions/Precautions: Fall Risk,General Precautions       General precautions, fall risk      Communication with other providers:  cotx with PT Benedict for safety and assist.    Subjective:  Patient states:  I like working on trucks. Pain: none stated (location, type, intensity)  Objective:    Observation:  patient seated upright on recliner. Pleasantly confused. Sitter present. Objective Measures:  tele  Treatment, including education:    Therapeutic activity training was instructed today. Cues were given for safety, sequence, UE/LE placement, awareness, and balance. Activities performed today included bed mobility training, sup-sit, sit-stand, SPT. Stand to [address] x2 with increased time to motivate and encourage patient to initiate and participate. Patient stood x20 min with fair balance- Min A with cues for erect posture and redirection to task. Some dynamic balance activies were performed including - reaching, crossing midline, weight shifting and some unsupported standing. Patient able to ambulate x5-8 feet with CGA plus chair follow and cues for safety and redirection. Sit to recliner Min A for safe and slow descent. patient left with dinner set up on tray table. Patient educated on role of OT , benefits of OT and rationale for therapeutic intervention. All therapeutic intervention performed c emphasis on dynamic balance / standing tolerance to increase strength, endurance and activity tolerance for increased [place] c ADL tasks and func transfers / mobility.     Patient left safely in bedside chair at end of session, with call light in reach, alarm on and nursing aware. Gait belt was used for func transfers / mobility. Assessment / Impression:      Patient's tolerance of treatment: good  Adverse Reaction: none  Significant change in status and impact:  none  Barriers to improvement: confusion    Plan for Next Session:    Continue with OT POC    Time in:  1613  Time out:  1637  Timed treatment minutes:  24  Total treatment time:  24    Previously filed items:  Social/Functional History  Lives With: Spouse  Type of Home: House  Home Layout: One level,Performs ADL's on one level  Home Access: Stairs to enter with rails  Entrance Stairs - Number of Steps: 5  Entrance Stairs - Rails: Both  Bathroom Shower/Tub: Walk-in shower  Bathroom Toilet: Standard  Bathroom Accessibility: Accessible  Has the patient had two or more falls in the past year or any fall with injury in the past year?: Yes (2, tripped on dog, injured both (non hospital))  Receives Help From: Family (2 granddaughters)  ADL Assistance: Independent  Homemaking Assistance: Independent  Homemaking Responsibilities: Yes  Ambulation Assistance: Independent  Transfer Assistance: Independent  Active :  Yes             Electronically signed by:    NICHOLAS Smallwood COTA/L [id number]    5/13/2022, 5:11 PM

## 2022-05-13 NOTE — PROGRESS NOTES
Progress Note( Dr. Namrata Lipscomb)    Subjective:   Admit Date: 5/2/2022  PCP: Aminah Weiss MD    Admitted For : Shortness of breath with right-sided pleural effusion    Consulted For:  Better control of blood glucose      Interval History: Patient is extubated and off ventilator  Discontinue NG tube and the tube feeding    Bilateral pleural effusion.   Right and left lung had hydrothorax and right sided lung was aspirated, thoracentesis done    Started on Reglan bowel movements get better  Patient passed swallow evaluation advised nectar thick diet        Intake/Output Summary (Last 24 hours) at 5/13/2022 1928  Last data filed at 5/13/2022 1426  Gross per 24 hour   Intake 1070 ml   Output 750 ml   Net 320 ml       DATA    CBC:   Recent Labs     05/11/22  1030 05/12/22  0530 05/13/22  0357   WBC 13.6* 12.5* 10.8*   HGB 13.4* 13.3* 12.6*    208 179    CMP:  Recent Labs     05/11/22  0515 05/12/22  0530 05/13/22  0357    144 144   K 3.5 4.4 4.0    104 105   CO2 27 26 29   BUN 28* 33* 37*   CREATININE 1.5* 1.7* 1.7*   CALCIUM 8.5 9.2 9.6     Lipids:   Lab Results   Component Value Date    CHOL 174 05/07/2022    CHOL 230 10/14/2019    HDL 28 05/07/2022    TRIG 130 05/07/2022     Glucose:  Recent Labs     05/12/22  1637 05/12/22  2351 05/13/22  1233   POCGLU 129* 101* 132*     [id number]:  Lab Results   Component Value Date    LABA1C 6.1 08/09/2021     High Sensitivity TSH:   Lab Results   Component Value Date    TSHHS 0.900 05/05/2022     Free T3: No results found for: FT3  Free T4:  Lab Results   Component Value Date    T4FREE 0.78 03/29/2022            Scheduled Medicines   Medications:    insulin lispro  0-12 Units SubCUTAneous TID WC    insulin lispro  0-6 Units SubCUTAneous 2 times per day    furosemide  40 mg IntraVENous BID    sodium chloride  1 g Oral TID    enoxaparin  40 mg SubCUTAneous Daily    lidocaine PF  5 mL IntraDERmal Once    sodium chloride flush  5-40 mL IntraVENous 2 times per day    DULoxetine  60 mg Oral Daily    lactulose  20 g Oral TID    levothyroxine  50 mcg Oral Daily    magnesium oxide  200 mg Oral BID    [Held by provider] nadolol  40 mg Oral Daily    rifAXIMin  550 mg Oral BID    spironolactone  100 mg Oral Daily      Infusions:    sodium chloride Stopped (05/06/22 1431)    norepinephrine Stopped (05/11/22 1030)    sodium chloride Stopped (05/10/22 0144)    sodium chloride           Objective:   Vitals: BP (!) 135/98   Pulse 94   Temp 98.8 °F (37.1 °C) (Oral)   Resp 19   Ht 5' 7.99\" (1.727 m)   Wt 238 lb 12.1 oz (108.3 kg)   SpO2 98%   BMI 36.31 kg/m²   General appearance: Patient is extubated and off ventilator 5/11/2022 for discontinued NG tube feeding and NG tube  Neck: no JVD or bruit  Thyroid : Normal lobes   Lungs: Has Vesicular Breath sounds related diminished breath sound right-sided pleural effusion was aspirated earlier but it and again reaccumulated  Heart:  regular rate and rhythm  Abdomen: soft, non-tender; bowel sounds normal; no masses,  no organomegaly  Musculoskeletal: Normal  Extremities: extremities normal, , no edema  Neurologic:  Patient is extubated and off ventilator seem to be more alert    Assessment:     Patient Active Problem List:     Shortness of breath     History of colonic polyps     Alcoholic cirrhosis of liver with ascites (HCC)-Dr Jacques     Mixed hyperlipidemia     Hypertension     History of alcohol abuse     Gastroesophageal reflux disease without esophagitis     Anxiety     Pulmonary nodules     Hyperglycemia     Acquired hypothyroidism     Class 3 severe obesity with body mass index (BMI) of 40.0 to 44.9 in adult [facility])     Bilateral hearing loss     Increased ammonia level     JENNIFER (obstructive sleep apnea)     Portal hypertension (HCC)     SOB (shortness of breath)     Cirrhosis of liver with ascites (HCC)     Secondary esophageal varices without bleeding (HCC)     Gastric polyps     Hearing loss     Sepsis

## 2022-05-13 NOTE — PROGRESS NOTES
Physical Therapy    Physical Therapy Treatment Note  Name: Dannielle Garcia MRN: [id number] :   1945   Date:  2022   Admission Date: 2022 Room:  -A   Restrictions/Precautions:  Restrictions/Precautions  Restrictions/Precautions: Fall Risk,General Precautions, sitter  Communication with other providers:  Co-tx w/ Leatha Epley  Subjective:  Patient states: \"God told me he's a [place] man, he got a raw deal on a [employer]\"  Pain:   Location, Type, Intensity (0/10 to 10/10):  denies  Objective:    Observation:  Seated in recliner, awake, confused/hallucinating, agreeable to therapy. He is tangental t/o session, intermittently able to redirect and intermittently unable to redirect; cognition improving since last session. Tele, BP cuff, chair alarm, sitter present. Treatment, including education/measures:  Therapeutic Activity Training:   Therapeutic activity training was instructed today. Cues were given for safety, sequence, UE/LE placement, awareness, and balance. Activities performed today included bed mobility training, sup-sit, sit-stand, SPT.   Sit <-> stand: min A x 2 ; assist for initiation  Standing balance: fair ; standing ~ 15 - 20 mins performing dynamic balance activities including WSing ant/post and L/R, target reaching, unsupported standing  Pt ambulates 8 ft using FWW at [facility] regressing to min A to steady and redirect towards need for safety ; inc time and effort, inc postural sway, inconsistent step placement, dec awareness of need to maintain COM over LORENZO  Stand <-> sit: min A for control  Positioned for comfort and pressure relief; all needs met, chair alarmed, left in chair, call light in reach, gait belt for OOB, sitter present on completion of session  Inc time and effort 2/2 need to redirect pt ; he is intermittently tangental, less agitation this session    Assessment / Impression:    Initiated short distance ambulation, higher level balance activities, positional tolerance training this session; no complaints of pain or dizziness reported. Cont w/ impaired cognition, impaired strength/power affecting transfers, dec safety awareness, dec endurance, impaired balance requiring further skilled, intensive subacute therapy placement.     Patient's tolerance of treatment:  Fair +  Barriers to improvement:  Cognition; comorbid conditions;   Plan for Next Session:    Cont w/ est POC and DC plan (ARU)  Progress ambulation, balance, functional strength, exercises as pt tolerance/safety allows    Time in:  1613  Time out:  1637  Timed treatment minutes:  23  Total treatment time:  24    Previously filed items:  Social/Functional History  Lives With: Spouse  Type of Home: House  Home Layout: One level,Performs ADL's on one level  Home Access: Stairs to enter with rails  Entrance Stairs - Number of Steps: 5  Entrance Stairs - Rails: Both  Bathroom Shower/Tub: Walk-in shower  Bathroom Toilet: Standard  Bathroom Accessibility: Accessible  Has the patient had two or more falls in the past year or any fall with injury in the past year?: Yes (2, tripped on dog, injured both (non hospital))  Receives Help From: Family (2 granddaughters)  ADL Assistance: Independent  Homemaking Assistance: Independent  Homemaking Responsibilities: Yes  Ambulation Assistance: Independent  Transfer Assistance: Independent  Active : Yes  Patient Goals   Patient goals : return to [address]  Time Frame for Long term goals : 1 week  Long term goal 1: pt will complete bed mobility at min A  Long term goal 2: pt will complete transfers at [address] [facility] term goal 3: pt will ambulate 50 ft using FWW at [facility]       Electronically signed by:    Marci Perez PT, DPT  5/13/2022, 4:42 PM

## 2022-05-13 NOTE — CARE COORDINATION
Patient received to 2026. PT/OT notes reviewed; rec ARU. Referral to ARU called to Franco Garcia. liaison.  Kristan Dean RN

## 2022-05-13 NOTE — PROGRESS NOTES
[id number] [address] OF SPEECH/LANGUAGE PATHOLOGY  DAILY PROGRESS NOTE  Ashlyn Bello  5/13/2022  [id number]  Pleural effusion [L02]  Alcoholic cirrhosis of liver with ascites ([facility] [K70.31]  Acute on chronic respiratory failure with hypoxemia (HCC) [J96.21]  Acute respiratory failure (HCC) [J96.00]  Allergies   Allergen Reactions    Lisinopril Swelling     Tongue swelling      Zetia [Ezetimibe] Swelling     Facial swelling    Atorvastatin     Codeine Other (See Comments)     Blood pressure drops    Hydrochlorothiazide     Hydroxyzine      Fatigue and depressed    Lexapro [Escitalopram Oxalate]      Increased depression    Pravastatin          Pt was seen this date for dysphagia treatment. IMPRESSION AND RECOMMENDATIONS: Ashlyn Bello was seen for dysphagia follow-up seated upright in bed, alert, cooperative. Per RN pt tolerating PO diet without concerns, although he is showing a preference for softer/pureed foods at this time. Pt seen for reassessment of diet tolerance and possible advancement. He was presented with PO trials of honey thick liquids via tsp/cup/straw, nectar thick liquids via tsp/cup/straw, and soft/bite-sized foods. He was also administered pills by RN crushed in pudding. Oral stage appears mildly impaired, characterized by adequate labial seal, prolonged mastication with reduced bolus formation, adequate AP transit, and reduce clearance with soft and bite-sized solids (residue along hard palate). Pharyngeal stage appears improved without s/s aspiration across all trials. Pt does remain at increased risk for aspiration d/t poor attention and required continual cues throughout as a result. Recommend advancement to nectar thick liquids, continued soft/bite-sized diet. Assist with setup for meals. SLP will continue to follow.       GOALS (current status in bold):  Short-term Goals  Goal 1: Pt will consume honey-thick liquids and soft and bite sized diet with no s/s of penetration/aspiration. Meeting, diet advanced, modify goal  Goal 2: Pt will upgrade to mildly thick liquids and regular solids with no s/s of penetration/aspiration. Partially meeting, advance to mildly thick/nectar thick liquids, continue  Goal 3: Pt/caregivers will indicate understanding of all recommendations.  Meeting, continue          EDUCATION: as above    PAIN RATING (0-10 Scale):   Time in/Time out: SLP Individual Minutes  Time In: 6534  Time Out: 0938  Minutes: 25    Visit number: [id number] [address] [facility]  5/13/2022  10:31 AM

## 2022-05-14 LAB
GLUCOSE BLD-MCNC: 106 MG/DL (ref 70–99)
GLUCOSE BLD-MCNC: 122 MG/DL (ref 70–99)
GLUCOSE BLD-MCNC: 127 MG/DL (ref 70–99)
GLUCOSE BLD-MCNC: 130 MG/DL (ref 70–99)

## 2022-05-14 PROCEDURE — 6360000002 HC RX W HCPCS: Performed by: INTERNAL MEDICINE

## 2022-05-14 PROCEDURE — 2580000003 HC RX 258: Performed by: INTERNAL MEDICINE

## 2022-05-14 PROCEDURE — 6370000000 HC RX 637 (ALT 250 FOR IP): Performed by: INTERNAL MEDICINE

## 2022-05-14 PROCEDURE — 6360000002 HC RX W HCPCS: Performed by: HOSPITALIST

## 2022-05-14 PROCEDURE — 92526 ORAL FUNCTION THERAPY: CPT

## 2022-05-14 PROCEDURE — 82962 GLUCOSE BLOOD TEST: CPT

## 2022-05-14 PROCEDURE — 99232 SBSQ HOSP IP/OBS MODERATE 35: CPT | Performed by: INTERNAL MEDICINE

## 2022-05-14 PROCEDURE — 6370000000 HC RX 637 (ALT 250 FOR IP): Performed by: NURSE PRACTITIONER

## 2022-05-14 PROCEDURE — 2700000000 HC OXYGEN THERAPY PER DAY

## 2022-05-14 PROCEDURE — 2060000000 HC ICU INTERMEDIATE R&B

## 2022-05-14 PROCEDURE — 94761 N-INVAS EAR/PLS OXIMETRY MLT: CPT

## 2022-05-14 RX ORDER — HYDROCORTISONE ACETATE 25 MG/1
25 SUPPOSITORY RECTAL 2 TIMES DAILY
Status: DISCONTINUED | OUTPATIENT
Start: 2022-05-14 | End: 2022-05-18 | Stop reason: HOSPADM

## 2022-05-14 RX ADMIN — LACTULOSE 20 G: 10 SOLUTION ORAL at 05:00

## 2022-05-14 RX ADMIN — SODIUM CHLORIDE 1 G: 1 TABLET ORAL at 15:26

## 2022-05-14 RX ADMIN — LEVOTHYROXINE SODIUM 50 MCG: 25 TABLET ORAL at 05:00

## 2022-05-14 RX ADMIN — SODIUM CHLORIDE, PRESERVATIVE FREE 10 ML: 5 INJECTION INTRAVENOUS at 10:04

## 2022-05-14 RX ADMIN — LACTULOSE 20 G: 10 SOLUTION ORAL at 15:26

## 2022-05-14 RX ADMIN — RIFAXIMIN 550 MG: 550 TABLET ORAL at 21:35

## 2022-05-14 RX ADMIN — SODIUM CHLORIDE 1 G: 1 TABLET ORAL at 09:42

## 2022-05-14 RX ADMIN — ENOXAPARIN SODIUM 40 MG: 100 INJECTION SUBCUTANEOUS at 09:41

## 2022-05-14 RX ADMIN — DULOXETINE 60 MG: 30 CAPSULE, DELAYED RELEASE ORAL at 21:35

## 2022-05-14 RX ADMIN — SODIUM CHLORIDE, PRESERVATIVE FREE 10 ML: 5 INJECTION INTRAVENOUS at 21:34

## 2022-05-14 RX ADMIN — RIFAXIMIN 550 MG: 550 TABLET ORAL at 09:42

## 2022-05-14 RX ADMIN — MAGNESIUM OXIDE 400 MG (241.3 MG MAGNESIUM) TABLET 200 MG: TABLET at 21:35

## 2022-05-14 RX ADMIN — MAGNESIUM OXIDE 400 MG (241.3 MG MAGNESIUM) TABLET 200 MG: TABLET at 09:42

## 2022-05-14 RX ADMIN — SODIUM CHLORIDE 1 G: 1 TABLET ORAL at 21:45

## 2022-05-14 RX ADMIN — LACTULOSE 20 G: 10 SOLUTION ORAL at 21:37

## 2022-05-14 RX ADMIN — FUROSEMIDE 40 MG: 10 INJECTION, SOLUTION INTRAMUSCULAR; INTRAVENOUS at 17:56

## 2022-05-14 RX ADMIN — SPIRONOLACTONE 100 MG: 50 TABLET ORAL at 09:42

## 2022-05-14 RX ADMIN — FUROSEMIDE 40 MG: 10 INJECTION, SOLUTION INTRAMUSCULAR; INTRAVENOUS at 09:42

## 2022-05-14 ASSESSMENT — PAIN SCALES - GENERAL
PAINLEVEL_OUTOF10: 0

## 2022-05-14 NOTE — PROGRESS NOTES
Pulmonary and Critical Care  Progress Note      VITALS:  BP (!) 148/74   Pulse 97   Temp 96.9 °F (36.1 °C) (Oral)   Resp 17   Ht 5' 7.99\" (1.727 m)   Wt 231 lb 6.4 oz (105 kg)   SpO2 93%   BMI 35.19 kg/m²     Subjective:   CHIEF COMPLAINT :SOB     HPI:                The patient is lying in the bed. He is less confused. He is not in acute reps distress    Objective:   PHYSICAL EXAM:    LUNGS:decreased air entry right base  Abd-distended, BS+,NT  Ext- no pedal edema  CVS-s1s2, no murmurs      DATA:    CBC:  Recent Labs     05/12/22  0530 05/13/22  0357   WBC 12.5* 10.8*   RBC 4.12* 3.93*   HGB 13.3* 12.6*   HCT 41.2* 38.4*    179   .0 97.7   MCH 32.3* 32.1*   MCHC 32.3 32.8   RDW 15.1* 14.7   SEGSPCT 69.3* 66.0      BMP:  Recent Labs     05/12/22  0530 05/13/22  0357    144   K 4.4 4.0    105   CO2 26 29   BUN 33* 37*   CREATININE 1.7* 1.7*   CALCIUM 9.2 9.6   GLUCOSE 105* 125*      ABG:  No results for input(s): PH, PO2ART, FQL0YJL, HCO3, BEART, O2SAT in the last 72 hours. BNP  No results found for: BNP   D-Dimer:  Lab Results   Component Value Date    DDIMER 1606 (H) 09/13/2021      1.  Radiology: None      Assessment/Plan     Patient Active Problem List    Diagnosis Date Noted    Acute respiratory failure ([facility] [address] 05/04/2022     Priority: Medium    Acute on chronic respiratory failure with hypoxemia (HCC)      Priority: Medium    Pleural effusion 05/02/2022     Priority: Medium    Sepsis ([address] 03/19/2022    Hepatic encephalopathy ([address] 03/19/2022    Hearing loss 03/11/2022    Cirrhosis of liver with ascites ([address]     Secondary esophageal varices without bleeding ([address]     Gastric polyps     SOB (shortness of breath) 09/13/2021    Portal hypertension ([facility] [address] 08/30/2021    JENNIFER (obstructive sleep apnea) 08/09/2021    Bilateral hearing loss 05/07/2021    Increased ammonia level 05/07/2021    Class 3 severe obesity with body mass index (BMI) of 40.0 to 44.9 in adult [facility]) 09/21/2020    Hyperglycemia 12/04/2019    Acquired hypothyroidism 12/04/2019    Pulmonary nodules 06/05/3279    Alcoholic cirrhosis of liver with ascites (HCC)-Dr Jacques 08/08/2019    Mixed hyperlipidemia 08/08/2019    Hypertension 08/08/2019    History of alcohol abuse 08/08/2019    Gastroesophageal reflux disease without esophagitis 08/08/2019    Anxiety 08/08/2019    Shortness of breath 05/26/2019    History of colonic polyps 01/15/2019   Acute Hypoxic resp failure  Recurrent Right pleural effusion 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 JENNIFER  VDRF  Toxic Metabolic encephalopathy- improving  Moderate Malnutrition       1. Inhalers  2. Diuresis  3. CXR on Monday  4. ICS  5. OOB  6. PT/OT  7. Advance diet as tolerated  8. Keep sats > 92%  9.  C.w present management    Electronically signed by Savannah Marquez MD on 5/14/2022 at 11:37 AM

## 2022-05-14 NOTE — PLAN OF CARE
Problem: Discharge Planning  Goal: Discharge to home or other facility with appropriate resources  Outcome: Progressing     Problem: Pain  Goal: Verbalizes/displays adequate comfort level or baseline comfort level  Outcome: Progressing     Problem: Nutrition Deficit:  Goal: Optimize nutritional status  Outcome: Progressing     Problem: Safety - Adult  Goal: Free from fall injury  Outcome: Progressing     Problem: ABCDS Injury Assessment  Goal: Absence of physical injury  Outcome: Progressing     Problem: Skin/Tissue Integrity  Goal: Absence of new skin breakdown  Description: 1. Monitor for areas of redness and/or skin breakdown  2. Assess vascular access sites hourly  3. Every 4-6 hours minimum:  Change oxygen saturation probe site  4. Every 4-6 hours:  If on nasal continuous positive airway pressure, respiratory therapy assess nares and determine need for appliance change or resting period.   Outcome: Progressing

## 2022-05-14 NOTE — PROGRESS NOTES
Hospitalist Progress Note      Name:  Luke Givens /Age/Sex: 1945  (78 y.o. male)   MRN & CSN:  [id number] & [id number] Admission Date/Time: 2022 12:00 PM   Location:  -A PCP: Jaspreet Cain, [address] Day: 13    Assessment and Plan:   Luke Givens is a 78 y.o.  male  who presents with Pleural effusion    1) Acute Hypoxic Respiratory Failure  -Likely due to large Rt sided pleural effusion as well as Rhinovirus  -Mechanical intubation on 2022, extubated on 2022  -S/P Rt thoracentesis with removal of 2L of fluid 22  -S/P right thoracocentesis with removal of 1.4 L of fluid on 2022  -Per Pulm, fluid seems to be due to ascites  -Pulm on board, might need pleurodesis if recurrent    2) Decompensated Alcoholic liver cirrhosis with ascites  -S/P Paracentesis with drainage of 6.9L on , analysis consistent with portal HTN due to liver cirrhosis. Neg for SBP.  -Continue lasix and Aldactone  -GI on board, might need TIPS procedure if Rt sided pleural effusion continues and due to hepatic hydothorax  -Continue to monitor    3) Acute Hepatic Encephalopathy  -Continue Lactulose and Rifaximin  -Aim to achieve 2-3 BM daily  -Mentation improving    4) Hypotension  -Likely due to third spacing  -Improved, Weaned off pressor     Other chronic medical conditions: Medication resumed unless contraindicated    CKD stage III  History of hypothyroidism  Morbid obesity      Diet ADULT DIET;  Dysphagia - Soft and Bite Sized; Mildly Thick ([place])  ADULT ORAL NUTRITION SUPPLEMENT; Lunch, Dinner; Frozen Oral Supplement   DVT Prophylaxis [] Lovenox, []  Heparin, [] SCDs, [] Ambulation   GI Prophylaxis [] PPI,  [] H2 Blocker,  [] Carafate,  [] Diet/Tube Feeds   Code Status Full Code   Disposition TBD   MDM      History of Present Illness:     Patient was seen and examined  Mentation improving  Denied any chest pain, SOB  No acute event overnight      Objective: Intake/Output Summary (Last 24 hours) at 5/14/2022 1023  Last data filed at 5/14/2022 3767  Gross per 24 hour   Intake 720 ml   Output 320 ml   Net 400 ml      Vitals:   Vitals:    05/14/22 1015   BP:    Pulse:    Resp: 17   Temp:    SpO2: 93%     Physical Exam:   GEN Awake and alert   EYES Pupils are equally round. No scleral erythema, discharge, or conjunctivitis. HENT Mucous membranes are moist. Oral pharynx without exudates, no evidence of thrush. NECK Supple, no apparent thyromegaly or masses. RESP Clear to auscultation, no wheezes, rales or rhonchi. Symmetric chest movement while on 2L of O2  CARDIO/VASC S1/S2 auscultated. Regular rate without appreciable murmurs, rubs, or gallops. No JVD or carotid bruits. Peripheral pulses equal bilaterally and palpable. No peripheral edema. GI Abdomen is soft without significant tenderness, masses, or guarding. Bowel sounds are normoactive. Rectal exam deferred.  No costovertebral angle tenderness. Schilling catheter is present. HEME/LYMPH No palpable cervical lymphadenopathy and no hepatosplenomegaly. No petechiae or ecchymoses. MSK No gross joint deformities. SKIN Normal coloration, warm, dry.   NEURO No focal deficit, delirious    Medications:   Medications:    insulin lispro  0-12 Units SubCUTAneous TID WC    insulin lispro  0-6 Units SubCUTAneous 2 times per day    furosemide  40 mg IntraVENous BID    sodium chloride  1 g Oral TID    enoxaparin  40 mg SubCUTAneous Daily    lidocaine PF  5 mL IntraDERmal Once    sodium chloride flush  5-40 mL IntraVENous 2 times per day    DULoxetine  60 mg Oral Daily    lactulose  20 g Oral TID    levothyroxine  50 mcg Oral Daily    magnesium oxide  200 mg Oral BID    [Held by provider] nadolol  40 mg Oral Daily    rifAXIMin  550 mg Oral BID    spironolactone  100 mg Oral Daily      Infusions:    sodium chloride Stopped (05/06/22 1431)    norepinephrine Stopped (05/11/22 1030)    sodium chloride Stopped (05/10/22 0144)    sodium chloride       PRN Meds: potassium chloride, 20 mEq, PRN   Or  potassium chloride, 10 mEq, PRN  fentaNYL, 25 mcg, Q2H PRN  magnesium sulfate, 1,000 mg, PRN  sodium chloride flush, 5-40 mL, PRN  sodium chloride, , PRN  sodium phosphate IVPB, 15 mmol, PRN   Or  sodium phosphate IVPB, 15 mmol, PRN  sodium chloride flush, 5-40 mL, PRN  sodium chloride, , PRN  albuterol sulfate HFA, 2 puff, Q4H PRN  sodium chloride flush, 10 mL, PRN  sodium chloride, , PRN  nicotine polacrilex, 2 mg, Q1H PRN  acetaminophen, 650 mg, Q6H PRN   Or  acetaminophen, 650 mg, Q6H PRN  ondansetron, 4 mg, Q6H PRN  ALPRAZolam, 0.5 mg, Daily PRN          Electronically signed by Edwin Mac MD on 5/14/2022 at 10:23 AM

## 2022-05-14 NOTE — FLOWSHEET NOTE
This note also relates to the following rows which could not be included:  Pulse - Cannot attach notes to unvalidated device data  Resp - Cannot attach notes to unvalidated device data

## 2022-05-14 NOTE — PROGRESS NOTES
Progress Note( Dr. Katlyn Juarez)    Subjective:   Admit Date: 5/2/2022  PCP: Kristen Paul MD    Admitted For : Shortness of breath with right-sided pleural effusion    Consulted For:  Better control of blood glucose      Interval History: Patient is extubated and off ventilator  Discontinue NG tube and the tube feeding. Bilateral pleural effusion. Right and left lung had hydrothorax and right sided lung was aspirated, thoracentesis done      Started on Reglan bowel movements get better  Patient passed swallow evaluation advised nectar thick diet, . But he did not eating enough this morning.     Seem to be confused talking about some nonsense        Intake/Output Summary (Last 24 hours) at 5/14/2022 1021  Last data filed at 5/14/2022 9111  Gross per 24 hour   Intake 720 ml   Output 320 ml   Net 400 ml       DATA    CBC:   Recent Labs     05/11/22  1030 05/12/22  0530 05/13/22  0357   WBC 13.6* 12.5* 10.8*   HGB 13.4* 13.3* 12.6*    208 179    CMP:  Recent Labs     05/12/22  0530 05/13/22  0357    144   K 4.4 4.0    105   CO2 26 29   BUN 33* 37*   CREATININE 1.7* 1.7*   CALCIUM 9.2 9.6     Lipids:   Lab Results   Component Value Date    CHOL 174 05/07/2022    CHOL 230 10/14/2019    HDL 28 05/07/2022    TRIG 130 05/07/2022     Glucose:  Recent Labs     05/13/22  2127 05/14/22  0203 05/14/22  0935   POCGLU 156* 106* 122*     [id number]:  Lab Results   Component Value Date    LABA1C 6.1 08/09/2021     High Sensitivity TSH:   Lab Results   Component Value Date    TSHHS 0.900 05/05/2022     Free T3: No results found for: FT3  Free T4:  Lab Results   Component Value Date    T4FREE 0.78 03/29/2022            Scheduled Medicines   Medications:    insulin lispro  0-12 Units SubCUTAneous TID WC    insulin lispro  0-6 Units SubCUTAneous 2 times per day    furosemide  40 mg IntraVENous BID    sodium chloride  1 g Oral TID    enoxaparin  40 mg SubCUTAneous Daily    lidocaine PF  5 mL IntraDERmal Once    sodium chloride flush  5-40 mL IntraVENous 2 times per day    DULoxetine  60 mg Oral Daily    lactulose  20 g Oral TID    levothyroxine  50 mcg Oral Daily    magnesium oxide  200 mg Oral BID    [Held by provider] nadolol  40 mg Oral Daily    rifAXIMin  550 mg Oral BID    spironolactone  100 mg Oral Daily      Infusions:    sodium chloride Stopped (05/06/22 1431)    norepinephrine Stopped (05/11/22 1030)    sodium chloride Stopped (05/10/22 0144)    sodium chloride           Objective:   Vitals: BP (!) 148/74   Pulse 97   Temp 96.9 °F (36.1 °C) (Oral)   Resp 17   Ht 5' 7.99\" (1.727 m)   Wt 231 lb 6.4 oz (105 kg)   SpO2 93%   BMI 35.19 kg/m²   General appearance: Patient is extubated and off ventilator 5/11/2022 for discontinued NG tube feeding and NG tube  Neck: no JVD or bruit  Thyroid : Normal lobes   Lungs: Has Vesicular Breath sounds related diminished breath sound right-sided pleural effusion was aspirated earlier but it and again reaccumulated  Heart:  regular rate and rhythm  Abdomen: soft, non-tender; bowel sounds normal; no masses,  no organomegaly  Musculoskeletal: Normal  Extremities: extremities normal, , no edema  Neurologic:  Patient is extubated and off ventilator seem to be more alert.   But seem to be confused talking some nonissue    Assessment:     Patient Active Problem List:     Shortness of breath     History of colonic polyps     Alcoholic cirrhosis of liver with ascites (HCC)-Dr Jacques     Mixed hyperlipidemia     Hypertension     History of alcohol abuse     Gastroesophageal reflux disease without esophagitis     Anxiety     Pulmonary nodules     Hyperglycemia     Acquired hypothyroidism     Class 3 severe obesity with body mass index (BMI) of 40.0 to 44.9 in [facility])     Bilateral hearing loss     Increased ammonia level     JENNIFER (obstructive sleep apnea)     Portal hypertension (HCC)     SOB (shortness of breath)     Cirrhosis of liver with ascites [facility])     Secondary esophageal varices without bleeding (HCC)     Gastric polyps     Hearing loss     Sepsis (HCC)     Hepatic encephalopathy (HCC)     Pleural effusion     Acute on chronic respiratory failure with hypoxemia (HCC)     Acute respiratory failure (HCC)      Plan:     1. Reviewed POC blood glucose . Labs and X ray results   2. Reviewed Current Medicines   3. On correction bolus Humalog insulin  4. Monitor Blood glucose frequently   5. Modified  the dose of Insulin/ other medicines as needed  6. Will follow     .      Jacob Roldan MD, MD

## 2022-05-14 NOTE — PROGRESS NOTES
Pt alert and oriented only to self. Pt is disoriented, and having hallucinations and confused. Pt is reoriented and offered support, sitter at bedside. PT uses starastedy to toilet has BM, cleaned and returned to bed without complications. PT has family present and they are able to keep him calm and safe. Pt has alarm on for safety, pt denies complaints, all needs met, call light in reach. Family agrreable to plan of care and thank staff for care.

## 2022-05-15 LAB
AMMONIA: 47 UMOL/L (ref 16–60)
ANION GAP SERPL CALCULATED.3IONS-SCNC: 9 MMOL/L (ref 4–16)
BASOPHILS ABSOLUTE: 0.1 K/CU MM
BASOPHILS RELATIVE PERCENT: 1.4 % (ref 0–1)
BUN BLDV-MCNC: 37 MG/DL (ref 6–23)
CALCIUM SERPL-MCNC: 10.4 MG/DL (ref 8.3–10.6)
CHLORIDE BLD-SCNC: 101 MMOL/L (ref 99–110)
CO2: 30 MMOL/L (ref 21–32)
CREAT SERPL-MCNC: 1.7 MG/DL (ref 0.9–1.3)
DIFFERENTIAL TYPE: ABNORMAL
EOSINOPHILS ABSOLUTE: 0.7 K/CU MM
EOSINOPHILS RELATIVE PERCENT: 7.6 % (ref 0–3)
GFR AFRICAN AMERICAN: 48 ML/MIN/1.73M2
GFR NON-AFRICAN AMERICAN: 40 ML/MIN/1.73M2
GLUCOSE BLD-MCNC: 112 MG/DL (ref 70–99)
GLUCOSE BLD-MCNC: 116 MG/DL (ref 70–99)
GLUCOSE BLD-MCNC: 130 MG/DL (ref 70–99)
GLUCOSE BLD-MCNC: 131 MG/DL (ref 70–99)
GLUCOSE BLD-MCNC: 136 MG/DL (ref 70–99)
HCT VFR BLD CALC: 40.3 % (ref 42–52)
HEMOGLOBIN: 13.1 GM/DL (ref 13.5–18)
IMMATURE NEUTROPHIL %: 0.9 % (ref 0–0.43)
INR BLD: 1.12 INDEX
LYMPHOCYTES ABSOLUTE: 1.4 K/CU MM
LYMPHOCYTES RELATIVE PERCENT: 15.6 % (ref 24–44)
MAGNESIUM: 2.4 MG/DL (ref 1.8–2.4)
MCH RBC QN AUTO: 31.6 PG (ref 27–31)
MCHC RBC AUTO-ENTMCNC: 32.5 % (ref 32–36)
MCV RBC AUTO: 97.1 FL (ref 78–100)
MONOCYTES ABSOLUTE: 1.2 K/CU MM
MONOCYTES RELATIVE PERCENT: 13.6 % (ref 0–4)
NUCLEATED RBC %: 0 %
PDW BLD-RTO: 14.1 % (ref 11.7–14.9)
PHOSPHORUS: 3.1 MG/DL (ref 2.5–4.9)
PLATELET # BLD: 220 K/CU MM (ref 140–440)
PMV BLD AUTO: 10.8 FL (ref 7.5–11.1)
POTASSIUM SERPL-SCNC: 4.1 MMOL/L (ref 3.5–5.1)
PROCALCITONIN: 0.2
PROTHROMBIN TIME: 14.4 SECONDS (ref 11.7–14.5)
RBC # BLD: 4.15 M/CU MM (ref 4.6–6.2)
SEGMENTED NEUTROPHILS ABSOLUTE COUNT: 5.5 K/CU MM
SEGMENTED NEUTROPHILS RELATIVE PERCENT: 60.9 % (ref 36–66)
SODIUM BLD-SCNC: 140 MMOL/L (ref 135–145)
TOTAL IMMATURE NEUTOROPHIL: 0.08 K/CU MM
TOTAL NUCLEATED RBC: 0 K/CU MM
WBC # BLD: 9.1 K/CU MM (ref 4–10.5)

## 2022-05-15 PROCEDURE — 2700000000 HC OXYGEN THERAPY PER DAY

## 2022-05-15 PROCEDURE — 6370000000 HC RX 637 (ALT 250 FOR IP): Performed by: INTERNAL MEDICINE

## 2022-05-15 PROCEDURE — 83735 ASSAY OF MAGNESIUM: CPT

## 2022-05-15 PROCEDURE — 6370000000 HC RX 637 (ALT 250 FOR IP): Performed by: NURSE PRACTITIONER

## 2022-05-15 PROCEDURE — 82140 ASSAY OF AMMONIA: CPT

## 2022-05-15 PROCEDURE — 94150 VITAL CAPACITY TEST: CPT

## 2022-05-15 PROCEDURE — 2580000003 HC RX 258: Performed by: INTERNAL MEDICINE

## 2022-05-15 PROCEDURE — 94761 N-INVAS EAR/PLS OXIMETRY MLT: CPT

## 2022-05-15 PROCEDURE — 82962 GLUCOSE BLOOD TEST: CPT

## 2022-05-15 PROCEDURE — 84100 ASSAY OF PHOSPHORUS: CPT

## 2022-05-15 PROCEDURE — 84145 PROCALCITONIN (PCT): CPT

## 2022-05-15 PROCEDURE — 85025 COMPLETE CBC W/AUTO DIFF WBC: CPT

## 2022-05-15 PROCEDURE — 85610 PROTHROMBIN TIME: CPT

## 2022-05-15 PROCEDURE — 80048 BASIC METABOLIC PNL TOTAL CA: CPT

## 2022-05-15 PROCEDURE — 6360000002 HC RX W HCPCS: Performed by: INTERNAL MEDICINE

## 2022-05-15 PROCEDURE — 6360000002 HC RX W HCPCS: Performed by: HOSPITALIST

## 2022-05-15 PROCEDURE — 2060000000 HC ICU INTERMEDIATE R&B

## 2022-05-15 RX ADMIN — LACTULOSE 20 G: 10 SOLUTION ORAL at 06:25

## 2022-05-15 RX ADMIN — SODIUM CHLORIDE, PRESERVATIVE FREE 10 ML: 5 INJECTION INTRAVENOUS at 21:04

## 2022-05-15 RX ADMIN — ENOXAPARIN SODIUM 40 MG: 100 INJECTION SUBCUTANEOUS at 09:00

## 2022-05-15 RX ADMIN — LACTULOSE 20 G: 10 SOLUTION ORAL at 21:03

## 2022-05-15 RX ADMIN — MAGNESIUM OXIDE 400 MG (241.3 MG MAGNESIUM) TABLET 200 MG: TABLET at 09:00

## 2022-05-15 RX ADMIN — RIFAXIMIN 550 MG: 550 TABLET ORAL at 09:00

## 2022-05-15 RX ADMIN — FUROSEMIDE 40 MG: 10 INJECTION, SOLUTION INTRAMUSCULAR; INTRAVENOUS at 09:00

## 2022-05-15 RX ADMIN — SODIUM CHLORIDE 1 G: 1 TABLET ORAL at 21:03

## 2022-05-15 RX ADMIN — RIFAXIMIN 550 MG: 550 TABLET ORAL at 21:03

## 2022-05-15 RX ADMIN — LEVOTHYROXINE SODIUM 50 MCG: 25 TABLET ORAL at 06:25

## 2022-05-15 RX ADMIN — HYDROCORTISONE ACETATE 25 MG: 25 SUPPOSITORY RECTAL at 21:03

## 2022-05-15 RX ADMIN — FUROSEMIDE 40 MG: 10 INJECTION, SOLUTION INTRAMUSCULAR; INTRAVENOUS at 17:51

## 2022-05-15 RX ADMIN — DULOXETINE 60 MG: 30 CAPSULE, DELAYED RELEASE ORAL at 21:03

## 2022-05-15 RX ADMIN — SODIUM CHLORIDE 1 G: 1 TABLET ORAL at 09:00

## 2022-05-15 RX ADMIN — SPIRONOLACTONE 100 MG: 50 TABLET ORAL at 09:00

## 2022-05-15 RX ADMIN — MAGNESIUM OXIDE 400 MG (241.3 MG MAGNESIUM) TABLET 200 MG: TABLET at 21:03

## 2022-05-15 ASSESSMENT — PAIN SCALES - GENERAL
PAINLEVEL_OUTOF10: 0
PAINLEVEL_OUTOF10: 0

## 2022-05-15 NOTE — PROGRESS NOTES
Progress Note( Dr. Deshaun Hastings)    Subjective:   Admit Date: 5/2/2022  PCP: Jaspreet Cain MD    Admitted For : Shortness of breath with right-sided pleural effusion    Consulted For:  Better control of blood glucose      Interval History: Patient is extubated and off ventilator  Discontinue NG tube and the tube feeding. Bilateral pleural effusion. Right and left lung had hydrothorax and right sided lung was aspirated, thoracentesis done      Started on Reglan bowel movements get better  Patient passed swallow evaluation advised nectar thick diet, . But he did not eating enough  .   Seem to be confused talking about some nonsense    Intake/Output Summary (Last 24 hours) at 5/15/2022 0854  Last data filed at 5/15/2022 0626  Gross per 24 hour   Intake 480 ml   Output 1670 ml   Net -1190 ml       DATA    CBC:   Recent Labs     05/13/22  0357 05/15/22  0650   WBC 10.8* 9.1   HGB 12.6* 13.1*    220    CMP:  Recent Labs     05/13/22  0357 05/15/22  0650    140   K 4.0 4.1    101   CO2 29 30   BUN 37* 37*   CREATININE 1.7* 1.7*   CALCIUM 9.6 10.4     Lipids:   Lab Results   Component Value Date    CHOL 174 05/07/2022    CHOL 230 10/14/2019    HDL 28 05/07/2022    TRIG 130 05/07/2022     Glucose:  Recent Labs     05/14/22  1753 05/14/22  2132 05/15/22  0216   POCGLU 130* 127* 131*     [id number]:  Lab Results   Component Value Date    LABA1C 6.1 08/09/2021     High Sensitivity TSH:   Lab Results   Component Value Date    TSHHS 0.900 05/05/2022     Free T3: No results found for: FT3  Free T4:  Lab Results   Component Value Date    T4FREE 0.78 03/29/2022            Scheduled Medicines   Medications:    hydrocortisone  25 mg Rectal BID    insulin lispro  0-12 Units SubCUTAneous TID WC    insulin lispro  0-6 Units SubCUTAneous 2 times per day    furosemide  40 mg IntraVENous BID    sodium chloride  1 g Oral TID    enoxaparin  40 mg SubCUTAneous Daily    lidocaine PF  5 mL IntraDERmal Once  sodium chloride flush  5-40 mL IntraVENous 2 times per day    DULoxetine  60 mg Oral Daily    lactulose  20 g Oral TID    levothyroxine  50 mcg Oral Daily    magnesium oxide  200 mg Oral BID    [Held by provider] nadolol  40 mg Oral Daily    rifAXIMin  550 mg Oral BID    spironolactone  100 mg Oral Daily      Infusions:    sodium chloride Stopped (05/06/22 1431)    norepinephrine Stopped (05/11/22 1030)    sodium chloride Stopped (05/10/22 0144)    sodium chloride           Objective:   Vitals: BP (!) 156/83   Pulse 99   Temp 97.6 °F (36.4 °C) (Oral)   Resp 16   Ht 5' 7.99\" (1.727 m)   Wt 226 lb 11.2 oz (102.8 kg)   SpO2 93%   BMI 34.48 kg/m²   General appearance: Patient is extubated and off ventilator 5/11/2022 for discontinued NG tube feeding and NG tube  Neck: no JVD or bruit  Thyroid : Normal lobes   Lungs: Has Vesicular Breath sounds related diminished breath sound right-sided pleural effusion was aspirated earlier but it and again reaccumulated  Heart:  regular rate and rhythm  Abdomen: soft, non-tender; bowel sounds normal; no masses,  no organomegaly  Musculoskeletal: Normal  Extremities: extremities normal, , no edema  Neurologic:  Patient is extubated and off ventilator seem to be more alert.   But seem to be confused talking some nonissue    Assessment:     Patient Active Problem List:     Shortness of breath     History of colonic polyps     Alcoholic cirrhosis of liver with ascites (HCC)-Dr Jacques     Mixed hyperlipidemia     Hypertension     History of alcohol abuse     Gastroesophageal reflux disease without esophagitis     Anxiety     Pulmonary nodules     Hyperglycemia     Acquired hypothyroidism     Class 3 severe obesity with body mass index (BMI) of 40.0 to 44.9 in [facility])     Bilateral hearing loss     Increased ammonia level     JENNIFER (obstructive sleep apnea)     Portal hypertension (HCC)     SOB (shortness of breath)     Cirrhosis of liver with ascites ([facility] [place] [address] Secondary esophageal varices without bleeding (HCC)     Gastric polyps     Hearing loss     Sepsis (HCC)     Hepatic encephalopathy (HCC)     Pleural effusion     Acute on chronic respiratory failure with hypoxemia (HCC)     Acute respiratory failure (HCC)      Plan:     1. Reviewed POC blood glucose . Labs and X ray results   2. Reviewed Current Medicines   3. On correction bolus Humalog insulin  4. Monitor Blood glucose frequently   5. Modified  the dose of Insulin/ other medicines as needed  6. Will follow     .      Dilcia Walter MD, MD

## 2022-05-15 NOTE — PLAN OF CARE
Problem: Discharge Planning  Goal: Discharge to home or other facility with appropriate resources  5/15/2022 0318 by Adina Ruiz RN  Outcome: Progressing  5/14/2022 1538 by Gerard Barr RN  Outcome: Progressing     Problem: Pain  Goal: Verbalizes/displays adequate comfort level or baseline comfort level  5/15/2022 0318 by Adina Ruiz RN  Outcome: Progressing  5/14/2022 1538 by Gerard Barr RN  Outcome: Progressing     Problem: Nutrition Deficit:  Goal: Optimize nutritional status  5/15/2022 0318 by Adina Ruiz RN  Outcome: Progressing  5/14/2022 1538 by Gerard Barr RN  Outcome: Progressing     Problem: Safety - Adult  Goal: Free from fall injury  5/15/2022 0318 by Adina Ruiz RN  Outcome: Progressing  5/14/2022 1538 by Gerard Barr RN  Outcome: Progressing     Problem: ABCDS Injury Assessment  Goal: Absence of physical injury  5/15/2022 0318 by Adina Ruiz RN  Outcome: Progressing  5/14/2022 1538 by Gerard Barr RN  Outcome: Progressing     Problem: Skin/Tissue Integrity  Goal: Absence of new skin breakdown  Description: 1. Monitor for areas of redness and/or skin breakdown  2. Assess vascular access sites hourly  3. Every 4-6 hours minimum:  Change oxygen saturation probe site  4. Every 4-6 hours:  If on nasal continuous positive airway pressure, respiratory therapy assess nares and determine need for appliance change or resting period.   5/15/2022 0318 by Adina Ruiz RN  Outcome: Progressing  5/14/2022 1538 by Gerard Barr RN  Outcome: Progressing

## 2022-05-16 LAB
GLUCOSE BLD-MCNC: 102 MG/DL (ref 70–99)
GLUCOSE BLD-MCNC: 109 MG/DL (ref 70–99)
GLUCOSE BLD-MCNC: 127 MG/DL (ref 70–99)
GLUCOSE BLD-MCNC: 135 MG/DL (ref 70–99)
GLUCOSE BLD-MCNC: 95 MG/DL (ref 70–99)

## 2022-05-16 PROCEDURE — 6370000000 HC RX 637 (ALT 250 FOR IP): Performed by: NURSE PRACTITIONER

## 2022-05-16 PROCEDURE — 94761 N-INVAS EAR/PLS OXIMETRY MLT: CPT

## 2022-05-16 PROCEDURE — 97530 THERAPEUTIC ACTIVITIES: CPT

## 2022-05-16 PROCEDURE — 85025 COMPLETE CBC W/AUTO DIFF WBC: CPT

## 2022-05-16 PROCEDURE — 92526 ORAL FUNCTION THERAPY: CPT

## 2022-05-16 PROCEDURE — 2700000000 HC OXYGEN THERAPY PER DAY

## 2022-05-16 PROCEDURE — 2580000003 HC RX 258: Performed by: INTERNAL MEDICINE

## 2022-05-16 PROCEDURE — 83735 ASSAY OF MAGNESIUM: CPT

## 2022-05-16 PROCEDURE — 80048 BASIC METABOLIC PNL TOTAL CA: CPT

## 2022-05-16 PROCEDURE — 80053 COMPREHEN METABOLIC PANEL: CPT

## 2022-05-16 PROCEDURE — 82962 GLUCOSE BLOOD TEST: CPT

## 2022-05-16 PROCEDURE — 84100 ASSAY OF PHOSPHORUS: CPT

## 2022-05-16 PROCEDURE — 85610 PROTHROMBIN TIME: CPT

## 2022-05-16 PROCEDURE — 2060000000 HC ICU INTERMEDIATE R&B

## 2022-05-16 PROCEDURE — 6370000000 HC RX 637 (ALT 250 FOR IP): Performed by: INTERNAL MEDICINE

## 2022-05-16 PROCEDURE — 6360000002 HC RX W HCPCS: Performed by: INTERNAL MEDICINE

## 2022-05-16 PROCEDURE — 97116 GAIT TRAINING THERAPY: CPT

## 2022-05-16 PROCEDURE — 82140 ASSAY OF AMMONIA: CPT

## 2022-05-16 PROCEDURE — 99231 SBSQ HOSP IP/OBS SF/LOW 25: CPT | Performed by: INTERNAL MEDICINE

## 2022-05-16 PROCEDURE — 97535 SELF CARE MNGMENT TRAINING: CPT

## 2022-05-16 PROCEDURE — 6360000002 HC RX W HCPCS: Performed by: HOSPITALIST

## 2022-05-16 RX ADMIN — RIFAXIMIN 550 MG: 550 TABLET ORAL at 10:12

## 2022-05-16 RX ADMIN — MAGNESIUM OXIDE 400 MG (241.3 MG MAGNESIUM) TABLET 200 MG: TABLET at 21:26

## 2022-05-16 RX ADMIN — HYDROCORTISONE ACETATE 25 MG: 25 SUPPOSITORY RECTAL at 10:12

## 2022-05-16 RX ADMIN — MAGNESIUM OXIDE 400 MG (241.3 MG MAGNESIUM) TABLET 200 MG: TABLET at 10:11

## 2022-05-16 RX ADMIN — SODIUM CHLORIDE, PRESERVATIVE FREE 10 ML: 5 INJECTION INTRAVENOUS at 21:26

## 2022-05-16 RX ADMIN — LEVOTHYROXINE SODIUM 50 MCG: 25 TABLET ORAL at 05:35

## 2022-05-16 RX ADMIN — LACTULOSE 20 G: 10 SOLUTION ORAL at 05:35

## 2022-05-16 RX ADMIN — SPIRONOLACTONE 100 MG: 50 TABLET ORAL at 10:11

## 2022-05-16 RX ADMIN — LACTULOSE 20 G: 10 SOLUTION ORAL at 21:26

## 2022-05-16 RX ADMIN — SODIUM CHLORIDE, PRESERVATIVE FREE 10 ML: 5 INJECTION INTRAVENOUS at 10:13

## 2022-05-16 RX ADMIN — DULOXETINE 60 MG: 30 CAPSULE, DELAYED RELEASE ORAL at 21:26

## 2022-05-16 RX ADMIN — FUROSEMIDE 40 MG: 10 INJECTION, SOLUTION INTRAMUSCULAR; INTRAVENOUS at 10:12

## 2022-05-16 RX ADMIN — RIFAXIMIN 550 MG: 550 TABLET ORAL at 21:26

## 2022-05-16 RX ADMIN — ENOXAPARIN SODIUM 40 MG: 100 INJECTION SUBCUTANEOUS at 10:12

## 2022-05-16 RX ADMIN — HYDROCORTISONE ACETATE 25 MG: 25 SUPPOSITORY RECTAL at 21:26

## 2022-05-16 RX ADMIN — LACTULOSE 20 G: 10 SOLUTION ORAL at 14:39

## 2022-05-16 RX ADMIN — FUROSEMIDE 40 MG: 10 INJECTION, SOLUTION INTRAMUSCULAR; INTRAVENOUS at 18:37

## 2022-05-16 ASSESSMENT — PAIN SCALES - GENERAL: PAINLEVEL_OUTOF10: 0

## 2022-05-16 NOTE — PROGRESS NOTES
Speech Language Pathology  [facility]  DEPARTMENT OF SPEECH/LANGUAGE PATHOLOGY  DAILY PROGRESS NOTE  Aleisha Alvarez  5/16/2022  [id number]  Pleural effusion [N79]  Alcoholic cirrhosis of liver with ascites ([facility] [address] [K70.31]  Acute on chronic respiratory failure with hypoxemia (HCC) [J96.21]  Acute respiratory failure (HCC) [J96.00]  Allergies   Allergen Reactions    Lisinopril Swelling     Tongue swelling      Zetia [Ezetimibe] Swelling     Facial swelling    Atorvastatin     Codeine Other (See Comments)     Blood pressure drops    Hydrochlorothiazide     Hydroxyzine      Fatigue and depressed    Lexapro [Escitalopram Oxalate]      Increased depression    Pravastatin          Pt was seen this date for dysphagia treatment. IMPRESSION AND RECOMMENDATIONS: Aleisha Alvarez was seen for dysphagia follow-up. He was seated upright in chair, alert, confused, cooperative given frequent redirections. Sitter present throughout. Targeted reassessment for possible diet advancement. Noted thin liquids on tray table. Pt presented with PO trials of thin liquids via cup/straw, puree, and regular solids. Oral stage WFL with intact labial seal, slow/adequate mastication, intact AP transit/clearance. Suspect intermittent premature pharyngeal entry with pt speaking during PO intake; he does require continual cues to wait to speak until after swallowing. Pharyngeal stage appears intact with adequate swallow initiation/laryngeal elevation. Immediate cough noted with thin liquids x1 only; this appeared related to attention deficit rather than swallow impairment. Recommend advancement to regular diet/thin liquids. No further acute SLP needs identified at this time.       GOALS (current status in bold):  Short-term Goals  Goal 1: Pt will consume honey-tick liquids and soft and bite sized diet with no s/s of penetration/aspiration; Met  Goal 2: Pt will upgrade to mildly thick liquids and regular solids with no s/s of penetration/aspiration; Met  Goal 3: Pt/caregivers will indicate understanding of all recommendations; Goal met      EDUCATION: results, recommendations    PAIN RATING (0-10 Scale): 0  Time in/Time out: 3204-4774  SLP Minutes: 15  Visit number: [id number]  5/16/2022  1:01 PM

## 2022-05-16 NOTE — PROGRESS NOTES
Pulmonary and Critical Care  Progress Note      VITALS:  BP (!) 153/71   Pulse 91   Temp 97.6 °F (36.4 °C) (Oral)   Resp 21   Ht 5' 7.99\" (1.727 m)   Wt 228 lb 6.4 oz (103.6 kg)   SpO2 94%   BMI 34.74 kg/m²     Subjective:   CHIEF COMPLAINT :SOB     HPI:                The patient is sitting in the bed. He is not in acute resp distress    Objective:   PHYSICAL EXAM:    LUNGS:decreased air entry right base  Abd-distended, BS+,NT  Ext- no pedal edema  CVS-s1s2, no murmurs      DATA:    CBC:  Recent Labs     05/15/22  0650   WBC 9.1   RBC 4.15*   HGB 13.1*   HCT 40.3*      MCV 97.1   MCH 31.6*   MCHC 32.5   RDW 14.1   SEGSPCT 60.9      BMP:  Recent Labs     05/15/22  0650      K 4.1      CO2 30   BUN 37*   CREATININE 1.7*   CALCIUM 10.4   GLUCOSE 130*      ABG:  No results for input(s): PH, PO2ART, FRF0LLH, HCO3, BEART, O2SAT in the last 72 hours. BNP  No results found for: BNP   D-Dimer:  Lab Results   Component Value Date    DDIMER 1606 (H) 09/13/2021      1.  Radiology: None      Assessment/Plan     Patient Active Problem List    Diagnosis Date Noted    Acute respiratory failure ([address] 05/04/2022     Priority: Medium    Acute on chronic respiratory failure with hypoxemia (HCC)      Priority: Medium    Pleural effusion 05/02/2022     Priority: Medium    Sepsis ([address] 03/19/2022    Hepatic encephalopathy ([address] 03/19/2022    Hearing loss 03/11/2022    Cirrhosis of liver with ascites ([address]     Secondary esophageal varices without bleeding ([address]     Gastric polyps     SOB (shortness of breath) 09/13/2021    Portal hypertension ([address] 08/30/2021    JENNIFER (obstructive sleep apnea) 08/09/2021    Bilateral hearing loss 05/07/2021    Increased ammonia level 05/07/2021    Class 3 severe obesity with body mass index (BMI) of 40.0 to 44.9 in [facility]) 09/21/2020    Hyperglycemia 12/04/2019    Acquired hypothyroidism 12/04/2019    Pulmonary nodules 48/19/0804    Alcoholic cirrhosis of liver with ascites (HCC)-Dr Jacques 08/08/2019    Mixed hyperlipidemia 08/08/2019    Hypertension 08/08/2019    History of alcohol abuse 08/08/2019    Gastroesophageal reflux disease without esophagitis 08/08/2019    Anxiety 08/08/2019    Shortness of breath 05/26/2019    History of colonic polyps 01/15/2019   Acute Hypoxic resp failure  Recurrent Right pleural effusion 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 JENNIFER  VDRF  Toxic Metabolic encephalopathy- improving  Moderate Malnutrition       1. Diuresis  2. Inhalers  3. ICS  4. OOB  5. PT/OT  6. Keep sats > 92%  7.  C.w present management    Electronically signed by José Miguel Armstrong MD on 5/16/2022 at 9:22 AM

## 2022-05-16 NOTE — PROGRESS NOTES
Progress Note( Dr. Laura Veliz)    Subjective:   Admit Date: 5/2/2022  PCP: Constantine Diaz MD    Admitted For : Shortness of breath with right-sided pleural effusion    Consulted For:  Better control of blood glucose      Interval History: Patient is extubated and off ventilator  Discontinue NG tube and the tube feeding. Bilateral pleural effusion. Right and left lung had hydrothorax and right sided lung was aspirated, thoracentesis done  Is less confused this morning    Started on Reglan bowel movements get better  Patient passed swallow evaluation advised nectar thick diet, . According to the staff patient is eating somewhat better  .   Seem to be confused talking about some nonsense    Intake/Output Summary (Last 24 hours) at 5/16/2022 0835  Last data filed at 5/16/2022 0540  Gross per 24 hour   Intake --   Output 650 ml   Net -650 ml       DATA    CBC:   Recent Labs     05/15/22  0650   WBC 9.1   HGB 13.1*       CMP:  Recent Labs     05/15/22  0650      K 4.1      CO2 30   BUN 37*   CREATININE 1.7*   CALCIUM 10.4     Lipids:   Lab Results   Component Value Date    CHOL 174 05/07/2022    CHOL 230 10/14/2019    HDL 28 05/07/2022    TRIG 130 05/07/2022     Glucose:  Recent Labs     05/15/22  2108 05/16/22  0240 05/16/22  0803   POCGLU 136* 109* 127*     [id number]:  Lab Results   Component Value Date    LABA1C 6.1 08/09/2021     High Sensitivity TSH:   Lab Results   Component Value Date    TSHHS 0.900 05/05/2022     Free T3: No results found for: FT3  Free T4:  Lab Results   Component Value Date    T4FREE 0.78 03/29/2022            Scheduled Medicines   Medications:    hydrocortisone  25 mg Rectal BID    insulin lispro  0-12 Units SubCUTAneous TID WC    insulin lispro  0-6 Units SubCUTAneous 2 times per day    furosemide  40 mg IntraVENous BID    enoxaparin  40 mg SubCUTAneous Daily    lidocaine PF  5 mL IntraDERmal Once    sodium chloride flush  5-40 mL IntraVENous 2 times per day    DULoxetine  60 mg Oral Daily    lactulose  20 g Oral TID    levothyroxine  50 mcg Oral Daily    magnesium oxide  200 mg Oral BID    [Held by provider] nadolol  40 mg Oral Daily    rifAXIMin  550 mg Oral BID    spironolactone  100 mg Oral Daily      Infusions:    sodium chloride Stopped (05/06/22 1431)    norepinephrine Stopped (05/11/22 1030)    sodium chloride Stopped (05/10/22 0144)    sodium chloride           Objective:   Vitals: BP (!) 153/71   Pulse 91   Temp 97.6 °F (36.4 °C) (Oral)   Resp 21   Ht 5' 7.99\" (1.727 m)   Wt 228 lb 6.4 oz (103.6 kg)   SpO2 94%   BMI 34.74 kg/m²   General appearance: Patient is extubated and off ventilator 5/11/2022 for discontinued NG tube feeding and NG tube  Neck: no JVD or bruit  Thyroid : Normal lobes   Lungs: Has Vesicular Breath sounds related diminished breath sound right-sided pleural effusion was aspirated earlier but it and again reaccumulated  Heart:  regular rate and rhythm  Abdomen: soft, non-tender; bowel sounds normal; no masses,  no organomegaly  Musculoskeletal: Normal  Extremities: extremities normal, , no edema  Neurologic:  Patient is extubated and off ventilator seem to be more alert.   But seem to be confused talking some nonissue    Assessment:     Patient Active Problem List:     Shortness of breath     History of colonic polyps     Alcoholic cirrhosis of liver with ascites (HCC)-Dr Jacques     Mixed hyperlipidemia     Hypertension     History of alcohol abuse     Gastroesophageal reflux disease without esophagitis     Anxiety     Pulmonary nodules     Hyperglycemia     Acquired hypothyroidism     Class 3 severe obesity with body mass index (BMI) of 40.0 to 44.9 in adult [facility])     Bilateral hearing loss     Increased ammonia level     JENNIFER (obstructive sleep apnea)     Portal hypertension (HCC)     SOB (shortness of breath)     Cirrhosis of liver with ascites (HCC)     Secondary esophageal varices without bleeding (HCC)     Gastric polyps     Hearing loss     Sepsis ([facility] [place] [address]     Hepatic encephalopathy (HCC)     Pleural effusion     Acute on chronic respiratory failure with hypoxemia (HCC)     Acute respiratory failure (HCC)      Plan:     1. Reviewed POC blood glucose . Labs and X ray results   2. Reviewed Current Medicines   3. On correction bolus Humalog insulin  4. Monitor Blood glucose frequently   5. Modified  the dose of Insulin/ other medicines as needed  6. Will follow     .      Georgette Madera MD, MD

## 2022-05-16 NOTE — PROGRESS NOTES
Occupational Therapy  . Occupational Therapy Treatment Note  Name: Celina Gastelum MRN: [id number] :   1945   Date:  2022   Admission Date: 2022 Room:  -A     Pt would benefit from continued acute care OT services w/ discharge to ARU    Primary Problem:      Restrictions/Precautions:  Restrictions/Precautions  Restrictions/Precautions: Fall Risk,General Precautions           Communication with other providers:  Per chart review, patient ok for tx. Student NICHOLAS in room observing. Sitter present and supportive throughout. Subjective:  Patient states:  My back is killing me from where he poked me. Pain: no numerical rating but c/o pain in back from thoracentesis   (location, type, intensity)    Objective:    Observation:  Patient asleep. Very lethargic upon arrival. Patient still pleasantly confused and requires Max redirection throughout. Objective Measures:  Tele, 02, catheter. Treatment, including education:    ADL activity training was instructed today. Cues were given for safety, sequence, UE/LE placement, visual cues, and balance. Activities performed today included dressing, toileting, hand hygiene, and grooming. toileting- on standard commode- Mod A for cornelio care completion in stand + applying preporation H in stand. Patient did performe cornelio care CGA while seated with appropriate weight shift. LB dressing- Max A to thread BLE into new depends + hike. Hand hygiene- CGA in stance at sink with increased time for task and cues for redirection. oral care- CGA in stance at sink. Facial hygiene- Initially DEP to arouse patient while supine, CGA in stance at sink for second trial.    Feeding- SET UP    Therapeutic activity training was instructed today. Cues were given for safety, sequence, UE/LE placement, awareness, and balance. Activities performed today included bed mobility training, sup-sit, sit-stand, SPT.     High fowlers to EOB- Min A for trunk plus cues to initiate. Scooting hips forward- Mod A. Sitting balance EOB- SBA. Good balance. Stand to [address]- CGA for safety plus cues for hand placement. standing balance- CGA  functional mobility x7 feet CGA with x1 noted LOB. Patient able to correct self with Min A. Sit to toilet- CGA plus use of grab bar. Sitting balance on toilet- SBA with appropriate weight shifting. x15 min. Stand from toilet- Min A plus use of grab bars. Cues for hand placement. Standing balance during cornelio care and at sink- CGA plus cues for hand placement. Functional mobility x12 feet- CGA   plus Min A for walker negotiation ad cues for safety. Sit to recliner- CGA  Scooting hips back- SBA with max cues for initiation. Patient educated on role of OT , benefits of OT and rationale for therapeutic intervention. All therapeutic intervention performed c emphasis on dynamic balance / standing tolerance to increase strength, endurance and activity tolerance for increased [place] c ADL tasks and func transfers / mobility. Patient left safely in bedside chair at end of session, with call light in reach, alarm on and nursing aware. Gait belt was used for func transfers / mobility. Assessment / Impression:    Patient tolerated well as evidence by less assist levels.  patient still needing max cues for redirection and initiation     Patient's tolerance of treatment: well  Adverse Reaction: none  Significant change in status and impact:  none  Barriers to improvement: weakness, confusion    Plan for Next Session:    Continue with OT POC    Time in:  911  Time out:  1009  Timed treatment minutes:  58  Total treatment time:  62    Previously filed items:  Social/Functional History  Lives With: Spouse  Type of Home: House  Home Layout: One level,Performs ADL's on one level  Home Access: Stairs to enter with rails  Entrance Stairs - Number of Steps: 5  Entrance Stairs - Rails: Both  Bathroom Shower/Tub: Walk-in shower  Bathroom Toilet: Standard  Bathroom Accessibility: Accessible  Has the patient had two or more falls in the past year or any fall with injury in the past year?: Yes (2, tripped on dog, injured both (non hospital))  Receives Help From: Family (2 granddaughters)  ADL Assistance: [address] [place] [address] Avenue: Independent  Homemaking Responsibilities: Yes  Ambulation Assistance: Independent  Transfer Assistance: Independent  Active :  Yes             Electronically signed by:    NICHOLAS Davis COTA/L [id number]    5/16/2022, 10:12 AM

## 2022-05-16 NOTE — CARE COORDINATION
Whiteboard placed for updated therapy notes to assess patients participation and tolerance for possible admission to ARU.

## 2022-05-16 NOTE — PROGRESS NOTES
Hospitalist Progress Note      Name:  Liv Ochoa /Age/Sex: 1945  (78 y.o. male)   MRN & CSN:  [id number] & [id number] Admission Date/Time: 2022 12:00 PM   Location:  -A PCP: Kristen Paul, [address] Day: 15    Assessment and Plan:   Liv Ochoa is a 78 y.o.  male  who presents with Pleural effusion    1) Acute Hypoxic Respiratory Failure  -Likely due to large Rt sided pleural effusion as well as Rhinovirus  -Mechanical intubation on 2022, extubated on 2022  -S/P Rt thoracentesis with removal of 2L of fluid 22  -S/P right thoracocentesis with removal of 1.4 L of fluid on 2022  -Per Pulm, fluid seems to be due to ascites  -Pulm on board, might need pleurodesis if recurrent    2) Decompensated Alcoholic liver cirrhosis with ascites  -S/P Paracentesis with drainage of 6.9L on , analysis consistent with portal HTN due to liver cirrhosis. Neg for SBP.  -Continue lasix and Aldactone  -GI on board, might need TIPS procedure if Rt sided pleural effusion continues and due to hepatic hydothorax  -Continue to monitor    3) Acute Hepatic Encephalopathy  -Continue Lactulose and Rifaximin  -Aim to achieve 2-3 BM daily  -Mentation improving    4) Hypotension  -Likely due to third spacing  -Improved, Weaned off pressor     Other chronic medical conditions: Medication resumed unless contraindicated    CKD stage III  History of hypothyroidism  Morbid obesity      Diet ADULT DIET;  Dysphagia - Soft and Bite Sized; Mildly Thick ([place])  ADULT ORAL NUTRITION SUPPLEMENT; Lunch, Dinner; Frozen Oral Supplement   DVT Prophylaxis [] Lovenox, []  Heparin, [] SCDs, [] Ambulation   GI Prophylaxis [] PPI,  [] H2 Blocker,  [] Carafate,  [] Diet/Tube Feeds   Code Status Full Code   Disposition TBD   MDM      History of Present Illness:     Patient was seen and examined  Mentation slowly improving  Denied any chest pain, SOB  No acute event overnight      Objective: Intake/Output Summary (Last 24 hours) at 5/16/2022 1142  Last data filed at 5/16/2022 1025  Gross per 24 hour   Intake 330 ml   Output 550 ml   Net -220 ml      Vitals:   Vitals:    05/16/22 1009   BP: (!) 170/80   Pulse: 108   Resp: 26   Temp: 97.9 °F (36.6 °C)   SpO2: 98%     Physical Exam:   GEN Awake and alert   EYES Pupils are equally round. No scleral erythema, discharge, or conjunctivitis. HENT Mucous membranes are moist. Oral pharynx without exudates, no evidence of thrush. NECK Supple, no apparent thyromegaly or masses. RESP Clear to auscultation, no wheezes, rales or rhonchi. Symmetric chest movement while on room air  CARDIO/VASC S1/S2 auscultated. Regular rate without appreciable murmurs, rubs, or gallops. No JVD or carotid bruits. Peripheral pulses equal bilaterally and palpable. No peripheral edema. GI Abdomen is soft without significant tenderness, masses, or guarding. Bowel sounds are normoactive. Rectal exam deferred.  No costovertebral angle tenderness. Schilling catheter is present. HEME/LYMPH No palpable cervical lymphadenopathy and no hepatosplenomegaly. No petechiae or ecchymoses. MSK No gross joint deformities. SKIN Normal coloration, warm, dry.   NEURO No focal deficit, delirious    Medications:   Medications:    hydrocortisone  25 mg Rectal BID    insulin lispro  0-12 Units SubCUTAneous TID WC    insulin lispro  0-6 Units SubCUTAneous 2 times per day    furosemide  40 mg IntraVENous BID    enoxaparin  40 mg SubCUTAneous Daily    lidocaine PF  5 mL IntraDERmal Once    sodium chloride flush  5-40 mL IntraVENous 2 times per day    DULoxetine  60 mg Oral Daily    lactulose  20 g Oral TID    levothyroxine  50 mcg Oral Daily    magnesium oxide  200 mg Oral BID    [Held by provider] nadolol  40 mg Oral Daily    rifAXIMin  550 mg Oral BID    spironolactone  100 mg Oral Daily      Infusions:    sodium chloride Stopped (05/06/22 1431)  norepinephrine Stopped (05/11/22 1030)    sodium chloride Stopped (05/10/22 0144)    sodium chloride       PRN Meds: potassium chloride, 20 mEq, PRN   Or  potassium chloride, 10 mEq, PRN  fentaNYL, 25 mcg, Q2H PRN  magnesium sulfate, 1,000 mg, PRN  sodium chloride flush, 5-40 mL, PRN  sodium chloride, , PRN  sodium phosphate IVPB, 15 mmol, PRN   Or  sodium phosphate IVPB, 15 mmol, PRN  sodium chloride flush, 5-40 mL, PRN  sodium chloride, , PRN  albuterol sulfate HFA, 2 puff, Q4H PRN  sodium chloride flush, 10 mL, PRN  sodium chloride, , PRN  nicotine polacrilex, 2 mg, Q1H PRN  acetaminophen, 650 mg, Q6H PRN   Or  acetaminophen, 650 mg, Q6H PRN  ondansetron, 4 mg, Q6H PRN  ALPRAZolam, 0.5 mg, Daily PRN          Electronically signed by Griffin Shoemaker MD on 5/16/2022 at 11:42 AM

## 2022-05-17 LAB
ALBUMIN SERPL-MCNC: 3.3 GM/DL (ref 3.4–5)
ALP BLD-CCNC: 159 IU/L (ref 40–128)
ALT SERPL-CCNC: 55 U/L (ref 10–40)
AMMONIA: 52 UMOL/L (ref 16–60)
ANION GAP SERPL CALCULATED.3IONS-SCNC: 12 MMOL/L (ref 4–16)
AST SERPL-CCNC: 63 IU/L (ref 15–37)
BACTERIA: NEGATIVE /HPF
BASOPHILS ABSOLUTE: 0.1 K/CU MM
BASOPHILS RELATIVE PERCENT: 1.5 % (ref 0–1)
BILIRUB SERPL-MCNC: 0.7 MG/DL (ref 0–1)
BILIRUBIN URINE: NEGATIVE MG/DL
BLOOD, URINE: ABNORMAL
BUN BLDV-MCNC: 37 MG/DL (ref 6–23)
CALCIUM SERPL-MCNC: 10.1 MG/DL (ref 8.3–10.6)
CHLORIDE BLD-SCNC: 96 MMOL/L (ref 99–110)
CHLORIDE URINE RANDOM: 10 MMOL/L (ref 43–210)
CLARITY: CLEAR
CO2: 29 MMOL/L (ref 21–32)
COLOR: YELLOW
CREAT SERPL-MCNC: 1.6 MG/DL (ref 0.9–1.3)
DIFFERENTIAL TYPE: ABNORMAL
EOSINOPHILS ABSOLUTE: 0.7 K/CU MM
EOSINOPHILS RELATIVE PERCENT: 7 % (ref 0–3)
GFR AFRICAN AMERICAN: 51 ML/MIN/1.73M2
GFR NON-AFRICAN AMERICAN: 42 ML/MIN/1.73M2
GLUCOSE BLD-MCNC: 112 MG/DL (ref 70–99)
GLUCOSE BLD-MCNC: 116 MG/DL (ref 70–99)
GLUCOSE BLD-MCNC: 127 MG/DL (ref 70–99)
GLUCOSE BLD-MCNC: 129 MG/DL (ref 70–99)
GLUCOSE BLD-MCNC: 130 MG/DL (ref 70–99)
GLUCOSE BLD-MCNC: 140 MG/DL (ref 70–99)
GLUCOSE BLD-MCNC: 155 MG/DL (ref 70–99)
GLUCOSE, URINE: NEGATIVE MG/DL
HCT VFR BLD CALC: 43.2 % (ref 42–52)
HEMOGLOBIN: 13.8 GM/DL (ref 13.5–18)
HYALINE CASTS: 10 /LPF
IMMATURE NEUTROPHIL %: 1 % (ref 0–0.43)
INR BLD: 1.08 INDEX
KETONES, URINE: ABNORMAL MG/DL
LEUKOCYTE ESTERASE, URINE: NEGATIVE
LYMPHOCYTES ABSOLUTE: 1.2 K/CU MM
LYMPHOCYTES RELATIVE PERCENT: 12.4 % (ref 24–44)
MAGNESIUM: 2.3 MG/DL (ref 1.8–2.4)
MCH RBC QN AUTO: 31.6 PG (ref 27–31)
MCHC RBC AUTO-ENTMCNC: 31.9 % (ref 32–36)
MCV RBC AUTO: 98.9 FL (ref 78–100)
MONOCYTES ABSOLUTE: 1 K/CU MM
MONOCYTES RELATIVE PERCENT: 10.6 % (ref 0–4)
MUCUS: ABNORMAL HPF
NITRITE URINE, QUANTITATIVE: NEGATIVE
NON SQUAM EPI CELLS: <1 /HPF
NUCLEATED RBC %: 0 %
PDW BLD-RTO: 14.3 % (ref 11.7–14.9)
PH, URINE: 5.5 (ref 5–8)
PHOSPHORUS: 3.6 MG/DL (ref 2.5–4.9)
PLATELET # BLD: 232 K/CU MM (ref 140–440)
PMV BLD AUTO: 10.9 FL (ref 7.5–11.1)
POTASSIUM SERPL-SCNC: 4.2 MMOL/L (ref 3.5–5.1)
POTASSIUM, UR: 33 MMOL/L (ref 22–119)
PROTEIN UA: NEGATIVE MG/DL
PROTHROMBIN TIME: 13.9 SECONDS (ref 11.7–14.5)
RBC # BLD: 4.37 M/CU MM (ref 4.6–6.2)
RBC URINE: 82 /HPF (ref 0–3)
SEGMENTED NEUTROPHILS ABSOLUTE COUNT: 6.3 K/CU MM
SEGMENTED NEUTROPHILS RELATIVE PERCENT: 67.5 % (ref 36–66)
SODIUM BLD-SCNC: 137 MMOL/L (ref 135–145)
SODIUM URINE: 10 MMOL/L (ref 35–167)
SPECIFIC GRAVITY UA: 1.02 (ref 1–1.03)
T4 FREE: 1.03 NG/DL (ref 0.9–1.8)
TOTAL IMMATURE NEUTOROPHIL: 0.09 K/CU MM
TOTAL NUCLEATED RBC: 0 K/CU MM
TOTAL PROTEIN: 7.8 GM/DL (ref 6.4–8.2)
TRICHOMONAS: ABNORMAL /HPF
UROBILINOGEN, URINE: 0.2 MG/DL (ref 0.2–1)
WBC # BLD: 9.3 K/CU MM (ref 4–10.5)
WBC UA: 6 /HPF (ref 0–2)

## 2022-05-17 PROCEDURE — 6370000000 HC RX 637 (ALT 250 FOR IP): Performed by: INTERNAL MEDICINE

## 2022-05-17 PROCEDURE — 86335 IMMUNFIX E-PHORSIS/URINE/CSF: CPT

## 2022-05-17 PROCEDURE — 83735 ASSAY OF MAGNESIUM: CPT

## 2022-05-17 PROCEDURE — 81001 URINALYSIS AUTO W/SCOPE: CPT

## 2022-05-17 PROCEDURE — 99231 SBSQ HOSP IP/OBS SF/LOW 25: CPT | Performed by: INTERNAL MEDICINE

## 2022-05-17 PROCEDURE — 99231 SBSQ HOSP IP/OBS SF/LOW 25: CPT | Performed by: SPECIALIST

## 2022-05-17 PROCEDURE — 6360000002 HC RX W HCPCS: Performed by: HOSPITALIST

## 2022-05-17 PROCEDURE — 83520 IMMUNOASSAY QUANT NOS NONAB: CPT

## 2022-05-17 PROCEDURE — 84100 ASSAY OF PHOSPHORUS: CPT

## 2022-05-17 PROCEDURE — 94761 N-INVAS EAR/PLS OXIMETRY MLT: CPT

## 2022-05-17 PROCEDURE — 85025 COMPLETE CBC W/AUTO DIFF WBC: CPT

## 2022-05-17 PROCEDURE — 94150 VITAL CAPACITY TEST: CPT

## 2022-05-17 PROCEDURE — 84300 ASSAY OF URINE SODIUM: CPT

## 2022-05-17 PROCEDURE — 99222 1ST HOSP IP/OBS MODERATE 55: CPT | Performed by: INTERNAL MEDICINE

## 2022-05-17 PROCEDURE — 6370000000 HC RX 637 (ALT 250 FOR IP): Performed by: HOSPITALIST

## 2022-05-17 PROCEDURE — 84439 ASSAY OF FREE THYROXINE: CPT

## 2022-05-17 PROCEDURE — 80048 BASIC METABOLIC PNL TOTAL CA: CPT

## 2022-05-17 PROCEDURE — 82436 ASSAY OF URINE CHLORIDE: CPT

## 2022-05-17 PROCEDURE — 2580000003 HC RX 258: Performed by: INTERNAL MEDICINE

## 2022-05-17 PROCEDURE — 6370000000 HC RX 637 (ALT 250 FOR IP): Performed by: NURSE PRACTITIONER

## 2022-05-17 PROCEDURE — 2700000000 HC OXYGEN THERAPY PER DAY

## 2022-05-17 PROCEDURE — 1200000000 HC SEMI PRIVATE

## 2022-05-17 PROCEDURE — 84156 ASSAY OF PROTEIN URINE: CPT

## 2022-05-17 PROCEDURE — 80053 COMPREHEN METABOLIC PANEL: CPT

## 2022-05-17 PROCEDURE — 82140 ASSAY OF AMMONIA: CPT

## 2022-05-17 PROCEDURE — 36415 COLL VENOUS BLD VENIPUNCTURE: CPT

## 2022-05-17 PROCEDURE — 84133 ASSAY OF URINE POTASSIUM: CPT

## 2022-05-17 PROCEDURE — 85610 PROTHROMBIN TIME: CPT

## 2022-05-17 PROCEDURE — 82962 GLUCOSE BLOOD TEST: CPT

## 2022-05-17 RX ORDER — PANTOPRAZOLE SODIUM 40 MG/1
40 TABLET, DELAYED RELEASE ORAL
Status: DISCONTINUED | OUTPATIENT
Start: 2022-05-18 | End: 2022-05-17

## 2022-05-17 RX ORDER — SODIUM CHLORIDE 9 MG/ML
INJECTION, SOLUTION INTRAVENOUS CONTINUOUS
Status: DISCONTINUED | OUTPATIENT
Start: 2022-05-17 | End: 2022-05-18

## 2022-05-17 RX ORDER — PANTOPRAZOLE SODIUM 40 MG/1
40 TABLET, DELAYED RELEASE ORAL
Status: DISCONTINUED | OUTPATIENT
Start: 2022-05-17 | End: 2022-05-18 | Stop reason: HOSPADM

## 2022-05-17 RX ADMIN — HYDROCORTISONE ACETATE 25 MG: 25 SUPPOSITORY RECTAL at 21:53

## 2022-05-17 RX ADMIN — LACTULOSE 20 G: 10 SOLUTION ORAL at 21:51

## 2022-05-17 RX ADMIN — SODIUM CHLORIDE, PRESERVATIVE FREE 10 ML: 5 INJECTION INTRAVENOUS at 09:30

## 2022-05-17 RX ADMIN — RIFAXIMIN 550 MG: 550 TABLET ORAL at 09:38

## 2022-05-17 RX ADMIN — INSULIN LISPRO 2 UNITS: 100 INJECTION, SOLUTION INTRAVENOUS; SUBCUTANEOUS at 07:47

## 2022-05-17 RX ADMIN — ENOXAPARIN SODIUM 40 MG: 100 INJECTION SUBCUTANEOUS at 09:38

## 2022-05-17 RX ADMIN — LACTULOSE 20 G: 10 SOLUTION ORAL at 06:31

## 2022-05-17 RX ADMIN — LACTULOSE 20 G: 10 SOLUTION ORAL at 15:58

## 2022-05-17 RX ADMIN — DULOXETINE 60 MG: 30 CAPSULE, DELAYED RELEASE ORAL at 21:51

## 2022-05-17 RX ADMIN — SODIUM CHLORIDE, PRESERVATIVE FREE 10 ML: 5 INJECTION INTRAVENOUS at 21:53

## 2022-05-17 RX ADMIN — RIFAXIMIN 550 MG: 550 TABLET ORAL at 21:52

## 2022-05-17 RX ADMIN — PANTOPRAZOLE SODIUM 40 MG: 40 TABLET, DELAYED RELEASE ORAL at 09:34

## 2022-05-17 RX ADMIN — LEVOTHYROXINE SODIUM 50 MCG: 25 TABLET ORAL at 06:31

## 2022-05-17 RX ADMIN — MAGNESIUM OXIDE 400 MG (241.3 MG MAGNESIUM) TABLET 200 MG: TABLET at 21:52

## 2022-05-17 RX ADMIN — MAGNESIUM OXIDE 400 MG (241.3 MG MAGNESIUM) TABLET 200 MG: TABLET at 09:34

## 2022-05-17 RX ADMIN — HYDROCORTISONE ACETATE 25 MG: 25 SUPPOSITORY RECTAL at 09:40

## 2022-05-17 RX ADMIN — SPIRONOLACTONE 100 MG: 50 TABLET ORAL at 09:34

## 2022-05-17 RX ADMIN — SODIUM CHLORIDE: 9 INJECTION, SOLUTION INTRAVENOUS at 20:13

## 2022-05-17 ASSESSMENT — PAIN SCALES - GENERAL
PAINLEVEL_OUTOF10: 0

## 2022-05-17 NOTE — CONSULTS
Nephrology Service Consultation      [address], [address] [address]  Phone: [phone]  Office Hours: 8:30AM - 4:30PM  Monday - Friday            Patient:  Hoa Buchanan  MRN: [id number]  Consulting physician:  Clementina Booth MD  Reason for Consult: creat 1.7      PCP: Tyra Kendrick MD    HISTORY OF PRESENT ILLNESS:   The patient is a 78 y.o. male with alcoholic cirrhosis presented due to dyspnea. He was found to have a large right pleural effusion  Renal consult for creat 1.7 from 1.1 on admission  He is on NC  He denies vomiting or poor oral intake  BP has not been low  He has a silva cath in place  Sitter at bedside    REVIEW OF SYSTEMS:  14 point ROS is Negative. See positive ROS per HPI    Past Medical History:        Diagnosis Date    Anxiety     Cirrhosis, alcoholic ([address]     GERD (gastroesophageal reflux disease) 2000    GERD (gastroesophageal reflux disease)     Hyperlipidemia     Hypertension     Portal hypertension (HCC)     Pulmonary nodules     Sleep apnea     SOB (shortness of breath)        Past Surgical History:        Procedure Laterality Date    CHOLECYSTECTOMY      COLONOSCOPY      ENDOSCOPY, COLON, DIAGNOSTIC      FOOT SURGERY      needle removed,not sure which foot, per wife.  UPPER GASTROINTESTINAL ENDOSCOPY N/A 1/19/2022    EGD BIOPSY performed by Trinidad Dang MD at [address] [place] [address]         Medications:   Prior to Admission medications    Medication Sig Start Date End Date Taking?  Authorizing Provider   levothyroxine (SYNTHROID) 50 MCG tablet Take 1 tablet by mouth Daily 3/30/22 9/26/22  Choco Lisa MD   furosemide (LASIX) 40 MG tablet Take 1 tablet by mouth 2 times daily TAKE 1 tab am and pm 3/21/22   Marko Gipson MD   spironolactone (ALDACTONE) 100 MG tablet Take 1 tablet by mouth daily 3/21/22 4/20/22  Marko Gipson MD   lactulose (CHRONULAC) 10 GM/15ML solution Take 30 mLs by mouth 3 times daily Hold if having diarrhea 3/21/22   Rashaad Arias MD   rifaximin Lucianne Corolla) 550 MG tablet Take 1 tablet by mouth 2 times daily  Patient not taking: Reported on 5/2/2022 3/21/22   Rashaad Arias MD   DULoxetine (CYMBALTA) 60 MG extended release capsule TAKE 1 CAPSULE BY MOUTH ONCE DAILY 3/11/22 1/13/23  Luca Foster MD   nadolol (CORGARD) 40 MG tablet Take 1 tablet by mouth daily 11/18/21 2/14/22  Luca Foster MD   ferrous sulfate (IRON 325) 325 (65 Fe) MG tablet Take 1 tablet by mouth  Patient not taking: Reported on 5/2/2022    Historical Provider, MD   zinc gluconate 50 MG tablet Take 50 mg by mouth 2 times daily    Historical Provider, MD   b complex vitamins capsule Take 1 capsule by mouth daily    Historical Provider, MD   albuterol sulfate HFA (VENTOLIN HFA) 108 (90 Base) MCG/ACT inhaler Inhale 2 puffs into the lungs 4 times daily as needed for Wheezing 8/30/21   Luca Foster MD   tadalafil (CIALIS) 20 MG tablet Take 20 mg by mouth every 3 days     Historical Provider, MD   vitamin E 400 UNIT capsule Take 800 Units by mouth daily    Historical Provider, MD   magnesium oxide (MAG-OX) 400 MG tablet Take 200 mg by mouth 2 times daily OTC 1 qd 10/15/19   Historical Provider, MD   Multiple Vitamin (MULTI VITAMIN MENS PO) Take 1 tablet by mouth daily    Historical Provider, MD   ESOMEPRAZOLE MAGNESIUM PO Take 20 mg by mouth daily OTC    Historical Provider, MD   [place]-3 1000 MG CAPS Take 1 capsule by mouth daily    Historical Provider, MD        Allergies:  Lisinopril, Zetia [ezetimibe], Atorvastatin, Codeine, Hydrochlorothiazide, Hydroxyzine, Lexapro [escitalopram oxalate], and Pravastatin    Social History:   TOBACCO:   reports that he quit smoking about 46 years ago. He has a 14.00 pack-year smoking history. He has never used smokeless tobacco.  ETOH:   reports previous alcohol use.   OCCUPATION:      Family History:       Problem Relation Age of Onset    Hypertension Brother     Diabetes Other            Physical Exam:    Vitals: BP (!) 154/69   Pulse 99   Temp 98.3 °F (36.8 °C) (Oral)   Resp 15   Ht 5' 7.99\" (1.727 m)   Wt 234 lb 1.6 oz (106.2 kg)   SpO2 99%   BMI 35.60 kg/m²   General appearance: in no acute distress, appears stated age  Skin: Skin color, texture, turgor normal. No rashes or lesions  HEENT: normocephalic, atraumatic  Neck: supple, trachea midline  Lungs: clear to auscultation bilaterally, breathing comfortably  Heart[de-identified] regular rate and rhythm, S1, S2 normal,  Abdomen: soft, non-tender; bowel sounds normal; no masses,   Extremities: extremities normal, atraumatic, no cyanosis or edema  Neurologic: Mental status: alert, not oriented, interactive, following some commands  Psychiatric: mood and affect inappropriate    CBC:   Recent Labs     05/15/22  0650 05/17/22  0722   WBC 9.1 9.3   HGB 13.1* 13.8    232     BMP:    Recent Labs     05/15/22  0650      K 4.1      CO2 30   BUN 37*   CREATININE 1.7*   GLUCOSE 130*       -----------------------------------------------------------------      Assessment and Recommendations     Patient Active Problem List    Diagnosis Date Noted    [facility] 05/04/2022    Acute on chronic respiratory failure with hypoxemia (HCC)     Pleural effusion 05/02/2022    Sepsis ([facility] 03/19/2022    Hepatic encephalopathy ([facility] 03/19/2022    Hearing loss 03/11/2022    Cirrhosis of liver with ascites ([facility]     Secondary esophageal varices without bleeding (HCC)     Gastric polyps     SOB (shortness of breath) 09/13/2021    Portal hypertension ([facility] 08/30/2021    JENNIFER (obstructive sleep apnea) 08/09/2021    Bilateral hearing loss 05/07/2021    Increased ammonia level 05/07/2021    Class 3 severe obesity with body mass index (BMI) of 40.0 to 44.9 in adult [facility]) 09/21/2020    Hyperglycemia 12/04/2019    Acquired hypothyroidism 12/04/2019    Pulmonary nodules 66/61/0380    Alcoholic cirrhosis of liver with ascites (HCC)-Dr Jacques 08/08/2019    Mixed hyperlipidemia 08/08/2019    Hypertension 08/08/2019    History of alcohol abuse 08/08/2019    Gastroesophageal reflux disease without esophagitis 08/08/2019    Anxiety 08/08/2019    Shortness of breath 05/26/2019    History of colonic polyps 01/15/2019     -CANDIE: cr 1.7 from 1.1 on admission//ddx: volume depletion vs ATN  -Alcoholic Cirrhosis  -Ascites   -Bilateral pleural effusion,   -Right pleural effusion likely from ascites    Plan:  Cr 1.7  Obtain UA and urine electrolytes  Hold diuretics  Avoid nephrotoxins  Will follow    Thank you      Electronically signed by Landen Chris DO on 5/17/2022 at 8:40 AM    MD Tahira Ramos DO [address],  [address]  [place] [address]  PHONE: [phone]  FAX: [phone]

## 2022-05-17 NOTE — PROGRESS NOTES
Progress Note( Dr. Meme Lyon)    Subjective:   Admit Date: 5/2/2022  PCP: Valdo Dodson MD    Admitted For : Shortness of breath with right-sided pleural effusion    Consulted For:  Better control of blood glucose      Interval History: Patient is extubated and off ventilator  Discontinue NG tube and the tube feeding. Bilateral pleural effusion. Right and left lung had hydrothorax and right sided lung was aspirated, thoracentesis done  Is less confused this morning    Started on Reglan bowel movements get better  Patient passed swallow evaluation advised nectar thick diet, . According to the staff patient is eating somewhat better  .   Seem to be confused talking about some nonsense    Intake/Output Summary (Last 24 hours) at 5/17/2022 0839  Last data filed at 5/17/2022 0600  Gross per 24 hour   Intake 780 ml   Output 1100 ml   Net -320 ml       DATA    CBC:   Recent Labs     05/15/22  0650 05/17/22  0722   WBC 9.1 9.3   HGB 13.1* 13.8    232    CMP:  Recent Labs     05/15/22  0650      K 4.1      CO2 30   BUN 37*   CREATININE 1.7*   CALCIUM 10.4     Lipids:   Lab Results   Component Value Date    CHOL 174 05/07/2022    CHOL 230 10/14/2019    HDL 28 05/07/2022    TRIG 130 05/07/2022     Glucose:  Recent Labs     05/16/22  2132 05/17/22  0130 05/17/22  0722   POCGLU 102* 155* 140*     [id number]:  Lab Results   Component Value Date    LABA1C 6.1 08/09/2021     High Sensitivity TSH:   Lab Results   Component Value Date    TSHHS 0.900 05/05/2022     Free T3: No results found for: FT3  Free T4:  Lab Results   Component Value Date    T4FREE 0.78 03/29/2022            Scheduled Medicines   Medications:    hydrocortisone  25 mg Rectal BID    insulin lispro  0-12 Units SubCUTAneous TID WC    insulin lispro  0-6 Units SubCUTAneous 2 times per day    [Held by provider] furosemide  40 mg IntraVENous BID    enoxaparin  40 mg SubCUTAneous Daily    lidocaine PF  5 mL IntraDERmal Once    sodium chloride flush  5-40 mL IntraVENous 2 times per day    DULoxetine  60 mg Oral Daily    lactulose  20 g Oral TID    levothyroxine  50 mcg Oral Daily    magnesium oxide  200 mg Oral BID    [Held by provider] nadolol  40 mg Oral Daily    rifAXIMin  550 mg Oral BID    spironolactone  100 mg Oral Daily      Infusions:    sodium chloride Stopped (05/06/22 1431)    norepinephrine Stopped (05/11/22 1030)    sodium chloride Stopped (05/10/22 0144)    sodium chloride           Objective:   Vitals: BP (!) 154/69   Pulse 99   Temp 98.3 °F (36.8 °C) (Oral)   Resp 15   Ht 5' 7.99\" (1.727 m)   Wt 234 lb 1.6 oz (106.2 kg)   SpO2 99%   BMI 35.60 kg/m²   General appearance: Patient is extubated and off ventilator 5/11/2022 for discontinued NG tube feeding and NG tube  Neck: no JVD or bruit  Thyroid : Normal lobes   Lungs: Has Vesicular Breath sounds related diminished breath sound right-sided pleural effusion was aspirated earlier but it and again reaccumulated  Heart:  regular rate and rhythm  Abdomen: soft, non-tender; bowel sounds normal; no masses,  no organomegaly  Musculoskeletal: Normal  Extremities: extremities normal, , no edema  Neurologic:  Patient is extubated and off ventilator seem to be more alert.   But seem to be confused talking some nonissue    Assessment:     Patient Active Problem List:     Shortness of breath     History of colonic polyps     Alcoholic cirrhosis of liver with ascites (HCC)-Dr Jacques     Mixed hyperlipidemia     Hypertension     History of alcohol abuse     Gastroesophageal reflux disease without esophagitis     Anxiety     Pulmonary nodules     Hyperglycemia     Acquired hypothyroidism     Class 3 severe obesity with body mass index (BMI) of 40.0 to 44.9 in [facility])     Bilateral hearing loss     Increased ammonia level     JENNIFER (obstructive sleep apnea)     Portal hypertension (HCC)     SOB (shortness of breath)     Cirrhosis of liver with ascites ([facility]     Secondary esophageal varices without bleeding (HCC)     Gastric polyps     Hearing loss     Sepsis (HCC)     Hepatic encephalopathy (HCC)     Pleural effusion     Acute on chronic respiratory failure with hypoxemia (HCC)     Acute respiratory failure (HCC)      Plan:     1. Reviewed POC blood glucose . Labs and X ray results   2. Reviewed Current Medicines   3. On correction bolus Humalog insulin  4. Monitor Blood glucose frequently   5. Modified  the dose of Insulin/ other medicines as needed  6. Reviewed noted from GI for asciters/ Cirrhosis and Hydrothorax   7. Will follow     .      Eliazar Manjarrez MD, MD

## 2022-05-17 NOTE — PROGRESS NOTES
Physical Therapy    Physical Therapy Treatment Note  Name: Liv Ochoa MRN: [id number] :   1945   Date:  2022   Admission Date: 2022 Room:  -A   Restrictions/Precautions:  Restrictions/Precautions  Restrictions/Precautions: Fall Risk,General Precautions           Subjective:  Patient states:  \"I'd like to walk down the dover\"  Pain:   Location, Type, Intensity (0/10 to 10/10): Denies; 0/10    Objective:    Observation:  Pt sitting upright in chair upon entry    Treatment, including education/measures:  Patient agreeable to participating in therapy at this time. Therapeutic Activity Training:   Therapeutic activity training was instructed today. Cues were given for safety, sequence, UE/LE placement, awareness, and balance. Activities performed today included bed mobility training, sup-sit, sit-stand. Pt completed sit to stand from chair/bed/toilet with minAx1 with verbal cues to push through chair/bed/grab bar and avoid pulling on walker. Pt completed stand to sit onto chair/bed/toilet with minAx1 with verbal cues to feel chair/bed/toilet against back of legs, reach back, and sit slowly. Increased time required for all task completion. Gait Training:  Cues were given for safety, sequence, device management, balance, posture, awareness, path. Pt ambulated 30 feet + 20 feet + 30 feet + 10 feet + 40 feet with assist varying from CGAx1 to modAx1 with a front wheeled walker with a decreased gait speed, a decreased step length bilaterally, intermittent retropulsion, and an unsteady gait. Pt provided with verbal and tactile cues for BLE placement, walker placement, and sequence throughout ambulation. Pt provided with verbal and tactile cues to maintain upright posture in order to avoid COM shifting outside of [place]. Pt provided with verbal cues for directions in order to successfully navigate hallway and return to correct room.     Safety  Patient left safely in the chair, with call light/phone in reach with alarm applied. Gait belt and mask were used for transfers and gait. Assessment / Impression:    Patient's tolerance of treatment:  Good; patient tolerated ambulation in hallway today  Adverse Reaction: none  Significant change in status and impact:  none  Barriers to improvement: generalized weakness    Plan for Next Session:    Continue progressing toward goals per plan of care. Progress independence with transfers and ambulation as tolerated and appropriate. Progress ambulation distance as tolerated and appropriate.       Time in:  1255  Time out:  1350  Timed treatment minutes:  55  Total treatment time:  54    Previously filed items:  Social/Functional History  Lives With: Spouse  Type of Home: House  Home Layout: One level,Performs ADL's on one level  Home Access: Stairs to enter with rails  Entrance Stairs - Number of Steps: 5  Entrance Stairs - Rails: Both  Bathroom Shower/Tub: Walk-in shower  Bathroom Toilet: Standard  Bathroom Accessibility: Accessible  Has the patient had two or more falls in the past year or any fall with injury in the past year?: Yes (2, tripped on dog, injured both (non hospital))  Receives Help From: Family (2 granddaughters)  ADL Assistance: Independent  Homemaking Assistance: Independent  Homemaking Responsibilities: Yes  Ambulation Assistance: Independent  Transfer Assistance: Independent  Active : Yes  Patient Goals   Patient goals : return to [address]  Time Frame for Long term goals : 1 week  Long term goal 1: pt will complete bed mobility at min A  Long term goal 2: pt will complete transfers at [facility] term goal 3: pt will ambulate 50 ft using FWW at [facility]       Electronically signed by:      Toya Kelly PT, DPT  License #: [id number]

## 2022-05-17 NOTE — PROGRESS NOTES
Pulmonary and Critical Care  Progress Note      VITALS:  BP (!) 154/69   Pulse 99   Temp 98.3 °F (36.8 °C) (Oral)   Resp 15   Ht 5' 7.99\" (1.727 m)   Wt 234 lb 1.6 oz (106.2 kg)   SpO2 99%   BMI 35.60 kg/m²     Subjective:   CHIEF COMPLAINT :SOB     HPI:                The patient is sitting in the bed. He is watching TV. He is not in acute resp distress    Objective:   PHYSICAL EXAM:    LUNGS:decreased air entry right base  Abd-distended, BS+,NT  Ext- no pedal edema  CVS-s1s2, no murmurs      DATA:    CBC:  Recent Labs     05/15/22  0650 05/17/22  0722   WBC 9.1 9.3   RBC 4.15* 4.37*   HGB 13.1* 13.8   HCT 40.3* 43.2    232   MCV 97.1 98.9   MCH 31.6* 31.6*   MCHC 32.5 31.9*   RDW 14.1 14.3   SEGSPCT 60.9 67.5*      BMP:  Recent Labs     05/15/22  0650 05/17/22  0722    137   K 4.1 4.2    96*   CO2 30 29   BUN 37* 37*   CREATININE 1.7* 1.6*   CALCIUM 10.4 10.1   GLUCOSE 130* 130*      ABG:  No results for input(s): PH, PO2ART, BFW6EIJ, HCO3, BEART, O2SAT in the last 72 hours. BNP  No results found for: BNP   D-Dimer:  Lab Results   Component Value Date    DDIMER 1606 (H) 09/13/2021      1.  Radiology: None      Assessment/Plan     Patient Active Problem List    Diagnosis Date Noted    CANDIE (acute kidney injury) ([address]      Priority: Medium    Acute respiratory failure ([address] 05/04/2022     Priority: Medium    Acute on chronic respiratory failure with hypoxemia ([address]      Priority: Medium    Recurrent right pleural effusion 05/02/2022     Priority: Medium    Sepsis ([address] 03/19/2022    Hepatic encephalopathy ([address] 03/19/2022    Hearing loss 03/11/2022    Cirrhosis of liver with ascites ([address]     Secondary esophageal varices without bleeding (HCC)     Gastric polyps     SOB (shortness of breath) 09/13/2021    Portal hypertension ([address] 08/30/2021    JENNIFER (obstructive sleep apnea) 08/09/2021    Bilateral hearing loss 05/07/2021    Increased ammonia level 05/07/2021    Class 3 severe obesity with body mass index (BMI) of 40.0 to 44.9 in adult [facility]) 09/21/2020    Hyperglycemia 12/04/2019    Acquired hypothyroidism 12/04/2019    Pulmonary nodules 77/78/5671    Alcoholic cirrhosis of liver with ascites (HCC)-Dr Jacques 08/08/2019    Mixed hyperlipidemia 08/08/2019    Hypertension 08/08/2019    History of alcohol abuse 08/08/2019    Gastroesophageal reflux disease without esophagitis 08/08/2019    Anxiety 08/08/2019    Shortness of breath 05/26/2019    History of colonic polyps 01/15/2019   Acute Hypoxic resp failure- improving  Recurrent Right pleural effusion 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 JENNIFER  VDRF  Toxic Metabolic encephalopathy- improving  Moderate Malnutrition       1. CXR in am  2. Diuresis  3. Inhalers  4. ICS  5. OOB  6. PT/OT  7. Keep sats > 92%  8.  C/w present management    Electronically signed by Amira Lutz MD on 5/17/2022 at 9:05 AM

## 2022-05-17 NOTE — PROGRESS NOTES
Confused  Sitter reports 2 large stools during last shift  abd moderately distended, non-tender    Impression:               1) cirrhosis with ascites and likely PSE              2) pleural effusion- ?  Hepatic hydrothorax                    Plan:              9) trying to control ascites/ pleural effusion with diuretics-- if unable will need to decide about TIPS- though can aggravate any element of PSE

## 2022-05-17 NOTE — CARE COORDINATION
Spoke with Shaylee from ARU . She provided update for CM. [facility] stated that patient will need to be sitter free for 24 hrs prior to admission to ARU and have improved cognition status . She also stated that she has spoken with patient's wife and she is agreeable to ARU. [facility] stated that she will discuss referral with Dr Duane Perez .  Case Management to follow

## 2022-05-17 NOTE — PROGRESS NOTES
Hospitalist Progress Note    Patient:  Padilla Cedeno  Unit/Bed:2026/2026-A   YOB: 1945       MRN: [id number] Acct: [de-identified]  PCP: Kareen Augustine MD    Date of Admission: 5/2/2022  --------------------------    Chief Complaint:          Hospital Course:     Padilla Cedeno is a 78 y.o. male hospitalized on 5/2/2022   for shortness of breath, acute respiratory failure, found to have ascites, hydrothorax, he required intubation, mechanical ventilation secondary to acute encephalopathy, no virus pleural effusion      Assessment/plan:        Acute hypoxic respiratory failure, multifactorial  -S/p intubation 5/5/2022 through 5/11/2022  -Wean off O2 as tolerated    Recurrent pleural effusion  -Status postthoracentesis 5/5/2022 (removal of 2 L) 5/12/2022 with removal of 1.4 L suspected to be hydrothorax from liver cirrhosis  -Continue monitoring, may need Pleurx catheter versus pleurodesis    Decompensated liver cirrhosis with ascites  -Appreciate GI input  -Status post paracentesis of 6.9 L 5/5/2022, no SBP  -Currently on Lasix and Aldactone  -May need TIPS (precluded by that encephalopathy)    Acute hepatic encephalopathy  -Improving  Continue lactulose and rifaximin  -Discontinue sitter    Acute kidney injury  Multifactorial, holding diuretics, albumin, consult nephrology  -Off vasopressors      Obesity  CKD stage III  Hypothyroidism  GERD, restarted BiPAP      Code Status: Full Code         DVT prophylaxis: Lovenox     Disposition: Continue patient care, likely need inpatient rehab versus ECF  Poor insight    Discussed with RN      ----------------      Subjective:     Patient seen and examined  Overnight events noted  RN and ancillary staff note reviewed    Alert, oriented to self and place, difficulty with time  Poor insight  This agitated, no major overnight events. Diet: ADULT ORAL NUTRITION SUPPLEMENT; Lunch, Dinner; Frozen Oral Supplement  ADULT DIET;  Regular    OBJECTIVE Exam:  BP (!) 157/81   Pulse 93   Temp 97.4 °F (36.3 °C) (Oral)   Resp 14   Ht 5' 7.99\" (1.727 m)   Wt 234 lb 1.6 oz (106.2 kg)   SpO2 96%   BMI 35.60 kg/m²            Gen: Not in distress. Alert. Chronically ill, confused to time, poor insight  Head: Normocephalic. Atraumatic. Eyes: Conjunctivae/corneas clear. ENT: Oral mucosa moist  Neck: No JVD. No obvious thyromegaly. CVS: Nml S1S2, no murmur  , RRR  Pulmomary: Diminished air entry  Gastrointestinal: Soft, non tender, slightly distended  Musculoskeletal: Trace bilateral edema  Neuro: No focal deficit. Moves extremity spontaneously.   No asterixis  Psychiatry: Alert, not agitated, poor insight    Medications:  Reviewed    Infusion Medications    sodium chloride Stopped (05/06/22 1431)    norepinephrine Stopped (05/11/22 1030)    sodium chloride Stopped (05/10/22 0144)    sodium chloride       Scheduled Medications    pantoprazole  40 mg Oral QAM AC    hydrocortisone  25 mg Rectal BID    insulin lispro  0-12 Units SubCUTAneous TID WC    insulin lispro  0-6 Units SubCUTAneous 2 times per day    [Held by provider] furosemide  40 mg IntraVENous BID    enoxaparin  40 mg SubCUTAneous Daily    lidocaine PF  5 mL IntraDERmal Once    sodium chloride flush  5-40 mL IntraVENous 2 times per day    DULoxetine  60 mg Oral Daily    lactulose  20 g Oral TID    levothyroxine  50 mcg Oral Daily    magnesium oxide  200 mg Oral BID    [Held by provider] nadolol  40 mg Oral Daily    rifAXIMin  550 mg Oral BID    spironolactone  100 mg Oral Daily     PRN Meds: potassium chloride **OR** potassium chloride, fentaNYL, magnesium sulfate, sodium chloride flush, sodium chloride, sodium phosphate IVPB **OR** sodium phosphate IVPB, sodium chloride flush, sodium chloride, albuterol sulfate HFA, sodium chloride flush, sodium chloride, nicotine polacrilex, acetaminophen **OR** acetaminophen, ondansetron, ALPRAZolam      Intake/Output Summary (Last 24 hours) at 5/17/2022 1417  Last data filed at 5/17/2022 0600  Gross per 24 hour   Intake 250 ml   Output 1100 ml   Net -850 ml             Labs:   Recent Labs     05/15/22  0650 05/17/22 0722   WBC 9.1 9.3   HGB 13.1* 13.8   HCT 40.3* 43.2    232     Recent Labs     05/15/22  0650 05/17/22 0722    137   K 4.1 4.2    96*   CO2 30 29   BUN 37* 37*   CREATININE 1.7* 1.6*   CALCIUM 10.4 10.1   PHOS 3.1 3.6     Recent Labs     05/17/22 0722   AST 63*   ALT 55*   BILITOT 0.7   ALKPHOS 159*     Recent Labs     05/15/22  0650 05/17/22 0722   INR 1.12 1.08     No results for input(s): CKTOTAL, TROPONINI in the last 72 hours. Urinalysis:      Lab Results   Component Value Date    NITRU NEGATIVE 05/17/2022    WBCUA 6 05/17/2022    BACTERIA NEGATIVE 05/17/2022    RBCUA 82 05/17/2022    BLOODU LARGE 05/17/2022    SPECGRAV 1.025 05/17/2022       Radiology:  XR CHEST PORTABLE   Final Result   Patient is status post thoracentesis without discrete pneumothorax. Intervally removed enteric and endotracheal tubes. Similar opacification of the right hemithorax. US CHEST INCLUDING MEDIASTINUM   Final Result   Large right pleural effusion. XR CHEST PORTABLE   Final Result   Complete opacification of right hemithorax which appears slightly progressed   in comparison to 05/10/2022, with decreased aeration at the right lung apex. As previously, this may represent a combination of layering pleural fluid,   atelectasis, and/or airspace disease      Similar small left pleural effusion. No significant interval change in tube and line position. XR CHEST PORTABLE   Final Result   Diffuse opacities are seen within the right lung. Findings could be related   to layering of a right pleural effusion and adjacent atelectasis/infiltrate. This is not significantly changed. Pulmonary vascular congestion. Tip of the ET tube is 2.5 cm above the berny.          CT HEAD WO CONTRAST   Final Result   No acute intracranial abnormality. US CHEST INCLUDING MEDIASTINUM   Final Result   Right pleural effusion. XR CHEST PORTABLE   Final Result   Increased hazy opacification of the right lung, favored to represent a   combination of progressed atelectasis and layering pleural fluid. Progressive underline airspace disease such as pneumonia is not excluded. Endotracheal tube appears retracted, now measuring about 7.3 cm above the   berny. This may be related in part to patient position. XR CHEST PORTABLE   Final Result   No interval change of right-sided airspace opacity and probable bilateral   pleural effusions. Endotracheal tube tip is 3.3 cm above the berny. XR ABDOMEN (KUB) (SINGLE AP VIEW)   Final Result   No radiopaque urinary collecting system calculus evident. Unremarkable bowel gas pattern. Unremarkable fecal burden. XR CHEST PORTABLE   Final Result   Diffuse opacities are seen within the right lung without significant change. Tip of the ET tube is 5.9 cm above the berny. XR CHEST PORTABLE   Final Result   Bilateral pleural effusions and airspace opacities. Endotracheal tube tip is approximately 5 cm above the berny. XR CHEST PORTABLE   Final Result   Improved aeration of the right lung field compared to 05/04/2022 with   persistent large areas of increased opacification right apical and right   mid-basilar regions consistent with underlying pulmonary consolidation,   mass/neoplasm, residual pleural fluid or thickening or asymmetric elevation   of left hemidiaphragm. No pneumothorax or procedure related complication   suggested      Consolidating infiltrate and/or moderate-sized pleural effusion left basilar   region.       Stable borderline cardiomegaly, satisfactory position of nasogastric and   endotracheal tubes, soft tissue density right paratracheal region consistent   with mass/neoplasm, lymphadenopathy or area pulmonary consolidation measuring   5.5 cm in greatest cephalocaudal dimension. US CHEST INCLUDING MEDIASTINUM   Final Result   Ultrasound guidance provided for right thoracentesis. XR CHEST PORTABLE   Final Result   Near complete opacification of the right hemithorax with mildly improved   aeration. Mildly increased left-sided interstitial opacities favoring interstitial   edema. Unchanged position of support devices. CT ABDOMEN PELVIS WO CONTRAST Additional Contrast? None   Final Result   Complete opacification of the right hemithorax with a large right pleural   effusion, with compressive atelectasis of the right lung, and shift of the   mediastinum to the left. Minor atelectasis noted in the left lung base. Large amount of ascites. Evidence of the cirrhosis. Stable left renal cyst.      Stable mesenteric and retroperitoneal lymphadenopathy without change since   2019. XR ABDOMEN FOR NG/OG/NE TUBE PLACEMENT   Final Result   Satisfactory position lines and catheters. Right sided chest opacification. Question extrinsic to the patient         [address]   Final Result   Large right pleural effusion. CT HEAD WO CONTRAST   Final Result   1. Motion compromised study but no acute intracranial abnormality. I would   suggest follow-up if symptoms persist.         IR US GUIDED PARACENTESIS   Final Result   Successful ultrasound-guided paracentesis with specimen sent for laboratory   evaluation. US DUP ABD PEL RETRO SCROT COMPLETE   Final Result   1. Portal and hepatic veins appear grossly patent. 2. Cirrhosis without focal liver lesion demonstrated. 3. Small volume ascites. XR CHEST PORTABLE   Final Result   Significant progression of opacification of the right hemithorax, which is   now complete. This likely represent progressed pleural effusion and   atelectasis.       Likely trace left pleural effusion, similar to prior         XR CHEST PORTABLE   Final Result   1. Status post right thoracentesis with new partial aeration of the right   lung. Large pleural effusion persists. 2. Small left pleural effusion. IR GUIDED THORACENTESIS PLEURAL   Final Result   Successful ultrasound guided right thoracentesis with specimen sent for   laboratory evaluation. .         XR CHEST PORTABLE   Final Result   Complete opacification of the right hemithorax likely due to a large right   pleural effusion and atelectasis.          CTA CHEST ABDOMEN PELVIS W CONTRAST    (Results Pending)               Electronically signed by Phoenix Lepe MD on 5/17/2022 at 2:17 PM

## 2022-05-17 NOTE — CARE COORDINATION
Reviewed updated therapy notes. Patient participating and tolerating therapy well. Patient appears to be a good candidate for ARU, once cognition improves to where 1:1 sitter can be removed. Patient will need to be free of 1:1 sitter for 24 hours prior to admitting to ARU. Spoke with patients spouse via phone and discussed possible admission to ARU. Per Nicole Avila she prefers patient to admit to ARU  prior to returning home with her. Nicole Avila states goal is for patient to return home with her at discharge from ARU. Explained to Nicole Avila that clinicals and therapy still need to be presented to ARU MD.  Also explained that patient will need to be without 1:1 sitter for 24 hours. Nicole Avila expresses her understanding. Will present to Dr. Juan Garcia and continue to follow to assess how patient does once 1:1 sitter is removed. 1130:  Met with patient who is sleeping. Spoke with 1:1 sitter who states patient has been staying in the bed and notifying her when he has to use the restroom. Per sitter patient following commands. Spoke with patients nurse about possibly removing sitter today. Per nurse patient has been doing well and plans to have sitter removed some time this afternoon. [facility]

## 2022-05-18 ENCOUNTER — APPOINTMENT (OUTPATIENT)
Dept: INTERVENTIONAL RADIOLOGY/VASCULAR | Age: 77
DRG: 432 | End: 2022-05-18
Payer: MEDICARE

## 2022-05-18 ENCOUNTER — APPOINTMENT (OUTPATIENT)
Dept: GENERAL RADIOLOGY | Age: 77
DRG: 432 | End: 2022-05-18
Payer: MEDICARE

## 2022-05-18 ENCOUNTER — HOSPITAL ENCOUNTER (INPATIENT)
Age: 77
LOS: 9 days | Discharge: HOME HEALTH | DRG: 947 | End: 2022-05-27
Attending: PHYSICAL MEDICINE & REHABILITATION | Admitting: PHYSICAL MEDICINE & REHABILITATION
Payer: MEDICARE

## 2022-05-18 VITALS
DIASTOLIC BLOOD PRESSURE: 68 MMHG | RESPIRATION RATE: 19 BRPM | WEIGHT: 244.5 LBS | TEMPERATURE: 98.2 F | SYSTOLIC BLOOD PRESSURE: 158 MMHG | OXYGEN SATURATION: 97 % | HEIGHT: 68 IN | BODY MASS INDEX: 37.05 KG/M2 | HEART RATE: 84 BPM

## 2022-05-18 LAB
ALBUMIN SERPL-MCNC: 3.1 GM/DL (ref 3.4–5)
ALP BLD-CCNC: 150 IU/L (ref 40–129)
ALT SERPL-CCNC: 46 U/L (ref 10–40)
ANION GAP SERPL CALCULATED.3IONS-SCNC: 9 MMOL/L (ref 4–16)
AST SERPL-CCNC: 53 IU/L (ref 15–37)
BASOPHILS ABSOLUTE: 0.1 K/CU MM
BASOPHILS RELATIVE PERCENT: 0.8 % (ref 0–1)
BILIRUB SERPL-MCNC: 0.6 MG/DL (ref 0–1)
BUN BLDV-MCNC: 32 MG/DL (ref 6–23)
CALCIUM SERPL-MCNC: 10.1 MG/DL (ref 8.3–10.6)
CHLORIDE BLD-SCNC: 101 MMOL/L (ref 99–110)
CO2: 28 MMOL/L (ref 21–32)
CREAT SERPL-MCNC: 1.3 MG/DL (ref 0.9–1.3)
DIFFERENTIAL TYPE: ABNORMAL
EOSINOPHILS ABSOLUTE: 0.5 K/CU MM
EOSINOPHILS RELATIVE PERCENT: 4.7 % (ref 0–3)
GFR AFRICAN AMERICAN: >60 ML/MIN/1.73M2
GFR NON-AFRICAN AMERICAN: 54 ML/MIN/1.73M2
GLUCOSE BLD-MCNC: 104 MG/DL (ref 70–99)
GLUCOSE BLD-MCNC: 105 MG/DL (ref 70–99)
GLUCOSE BLD-MCNC: 115 MG/DL (ref 70–99)
GLUCOSE BLD-MCNC: 119 MG/DL (ref 70–99)
GLUCOSE BLD-MCNC: 122 MG/DL (ref 70–99)
GLUCOSE BLD-MCNC: 136 MG/DL (ref 70–99)
HCT VFR BLD CALC: 38.9 % (ref 42–52)
HEMOGLOBIN: 12.5 GM/DL (ref 13.5–18)
IMMATURE NEUTROPHIL %: 0.5 % (ref 0–0.43)
INR BLD: 1.12 INDEX
LYMPHOCYTES ABSOLUTE: 1 K/CU MM
LYMPHOCYTES RELATIVE PERCENT: 9 % (ref 24–44)
MAGNESIUM: 2.4 MG/DL (ref 1.8–2.4)
MCH RBC QN AUTO: 32.4 PG (ref 27–31)
MCHC RBC AUTO-ENTMCNC: 32.1 % (ref 32–36)
MCV RBC AUTO: 100.8 FL (ref 78–100)
MONOCYTES ABSOLUTE: 1 K/CU MM
MONOCYTES RELATIVE PERCENT: 8.6 % (ref 0–4)
NUCLEATED RBC %: 0 %
PDW BLD-RTO: 14.4 % (ref 11.7–14.9)
PHOSPHORUS: 3.3 MG/DL (ref 2.5–4.9)
PLATELET # BLD: 232 K/CU MM (ref 140–440)
PMV BLD AUTO: 11 FL (ref 7.5–11.1)
POTASSIUM SERPL-SCNC: 4.7 MMOL/L (ref 3.5–5.1)
PROTHROMBIN TIME: 14.4 SECONDS (ref 11.7–14.5)
RBC # BLD: 3.86 M/CU MM (ref 4.6–6.2)
SARS-COV-2, NAAT: NOT DETECTED
SEGMENTED NEUTROPHILS ABSOLUTE COUNT: 8.5 K/CU MM
SEGMENTED NEUTROPHILS RELATIVE PERCENT: 76.4 % (ref 36–66)
SODIUM BLD-SCNC: 138 MMOL/L (ref 135–145)
SOURCE: NORMAL
TOTAL IMMATURE NEUTOROPHIL: 0.06 K/CU MM
TOTAL NUCLEATED RBC: 0 K/CU MM
TOTAL PROTEIN: 7.4 GM/DL (ref 6.4–8.2)
WBC # BLD: 11.2 K/CU MM (ref 4–10.5)

## 2022-05-18 PROCEDURE — 2580000003 HC RX 258: Performed by: INTERNAL MEDICINE

## 2022-05-18 PROCEDURE — 6370000000 HC RX 637 (ALT 250 FOR IP): Performed by: INTERNAL MEDICINE

## 2022-05-18 PROCEDURE — 99231 SBSQ HOSP IP/OBS SF/LOW 25: CPT | Performed by: INTERNAL MEDICINE

## 2022-05-18 PROCEDURE — 1280000000 HC REHAB R&B

## 2022-05-18 PROCEDURE — 36415 COLL VENOUS BLD VENIPUNCTURE: CPT

## 2022-05-18 PROCEDURE — 2709999900 HC NON-CHARGEABLE SUPPLY

## 2022-05-18 PROCEDURE — 36592 COLLECT BLOOD FROM PICC: CPT

## 2022-05-18 PROCEDURE — 71045 X-RAY EXAM CHEST 1 VIEW: CPT

## 2022-05-18 PROCEDURE — 87635 SARS-COV-2 COVID-19 AMP PRB: CPT

## 2022-05-18 PROCEDURE — 6360000002 HC RX W HCPCS: Performed by: HOSPITALIST

## 2022-05-18 PROCEDURE — 99233 SBSQ HOSP IP/OBS HIGH 50: CPT | Performed by: INTERNAL MEDICINE

## 2022-05-18 PROCEDURE — 6370000000 HC RX 637 (ALT 250 FOR IP): Performed by: NURSE PRACTITIONER

## 2022-05-18 PROCEDURE — 2700000000 HC OXYGEN THERAPY PER DAY

## 2022-05-18 PROCEDURE — 83735 ASSAY OF MAGNESIUM: CPT

## 2022-05-18 PROCEDURE — 2580000003 HC RX 258: Performed by: HOSPITALIST

## 2022-05-18 PROCEDURE — 84100 ASSAY OF PHOSPHORUS: CPT

## 2022-05-18 PROCEDURE — 6370000000 HC RX 637 (ALT 250 FOR IP): Performed by: HOSPITALIST

## 2022-05-18 PROCEDURE — 99231 SBSQ HOSP IP/OBS SF/LOW 25: CPT | Performed by: SPECIALIST

## 2022-05-18 PROCEDURE — 99223 1ST HOSP IP/OBS HIGH 75: CPT | Performed by: PHYSICAL MEDICINE & REHABILITATION

## 2022-05-18 PROCEDURE — 82962 GLUCOSE BLOOD TEST: CPT

## 2022-05-18 PROCEDURE — 85025 COMPLETE CBC W/AUTO DIFF WBC: CPT

## 2022-05-18 PROCEDURE — 80053 COMPREHEN METABOLIC PANEL: CPT

## 2022-05-18 PROCEDURE — 97116 GAIT TRAINING THERAPY: CPT

## 2022-05-18 PROCEDURE — 94761 N-INVAS EAR/PLS OXIMETRY MLT: CPT

## 2022-05-18 PROCEDURE — 97530 THERAPEUTIC ACTIVITIES: CPT

## 2022-05-18 PROCEDURE — 85610 PROTHROMBIN TIME: CPT

## 2022-05-18 PROCEDURE — 88108 CYTOPATH CONCENTRATE TECH: CPT | Performed by: PATHOLOGY

## 2022-05-18 RX ORDER — LEVOTHYROXINE SODIUM 0.05 MG/1
50 TABLET ORAL DAILY
Status: DISCONTINUED | OUTPATIENT
Start: 2022-05-19 | End: 2022-05-27 | Stop reason: HOSPADM

## 2022-05-18 RX ORDER — LANOLIN ALCOHOL/MO/W.PET/CERES
200 CREAM (GRAM) TOPICAL 2 TIMES DAILY
Status: DISCONTINUED | OUTPATIENT
Start: 2022-05-18 | End: 2022-05-27 | Stop reason: HOSPADM

## 2022-05-18 RX ORDER — INSULIN LISPRO 100 [IU]/ML
0-12 INJECTION, SOLUTION INTRAVENOUS; SUBCUTANEOUS
Status: CANCELLED | OUTPATIENT
Start: 2022-05-18

## 2022-05-18 RX ORDER — INSULIN LISPRO 100 [IU]/ML
0-6 INJECTION, SOLUTION INTRAVENOUS; SUBCUTANEOUS
Status: DISCONTINUED | OUTPATIENT
Start: 2022-05-18 | End: 2022-05-26

## 2022-05-18 RX ORDER — ONDANSETRON 2 MG/ML
4 INJECTION INTRAMUSCULAR; INTRAVENOUS EVERY 6 HOURS PRN
Status: CANCELLED | OUTPATIENT
Start: 2022-05-18

## 2022-05-18 RX ORDER — DULOXETIN HYDROCHLORIDE 30 MG/1
60 CAPSULE, DELAYED RELEASE ORAL DAILY
Status: DISCONTINUED | OUTPATIENT
Start: 2022-05-18 | End: 2022-05-27 | Stop reason: HOSPADM

## 2022-05-18 RX ORDER — ALPRAZOLAM 0.5 MG/1
0.5 TABLET ORAL DAILY PRN
Status: CANCELLED | OUTPATIENT
Start: 2022-05-18

## 2022-05-18 RX ORDER — POLYETHYLENE GLYCOL 3350 17 G
2 POWDER IN PACKET (EA) ORAL
Status: CANCELLED | OUTPATIENT
Start: 2022-05-18

## 2022-05-18 RX ORDER — DULOXETIN HYDROCHLORIDE 30 MG/1
60 CAPSULE, DELAYED RELEASE ORAL DAILY
Status: CANCELLED | OUTPATIENT
Start: 2022-05-18

## 2022-05-18 RX ORDER — POLYETHYLENE GLYCOL 3350 17 G
2 POWDER IN PACKET (EA) ORAL
Status: DISCONTINUED | OUTPATIENT
Start: 2022-05-18 | End: 2022-05-26

## 2022-05-18 RX ORDER — ONDANSETRON 2 MG/ML
4 INJECTION INTRAMUSCULAR; INTRAVENOUS EVERY 6 HOURS PRN
Status: DISCONTINUED | OUTPATIENT
Start: 2022-05-18 | End: 2022-05-27 | Stop reason: HOSPADM

## 2022-05-18 RX ORDER — SODIUM CHLORIDE 0.9 % (FLUSH) 0.9 %
5-40 SYRINGE (ML) INJECTION EVERY 12 HOURS SCHEDULED
Status: DISCONTINUED | OUTPATIENT
Start: 2022-05-18 | End: 2022-05-26

## 2022-05-18 RX ORDER — HYDROCORTISONE ACETATE 25 MG/1
25 SUPPOSITORY RECTAL 2 TIMES DAILY
Status: CANCELLED | OUTPATIENT
Start: 2022-05-18

## 2022-05-18 RX ORDER — MAGNESIUM SULFATE 1 G/100ML
1000 INJECTION INTRAVENOUS PRN
Status: CANCELLED | OUTPATIENT
Start: 2022-05-18

## 2022-05-18 RX ORDER — ALBUTEROL SULFATE 90 UG/1
2 AEROSOL, METERED RESPIRATORY (INHALATION) EVERY 4 HOURS PRN
Status: CANCELLED | OUTPATIENT
Start: 2022-05-18

## 2022-05-18 RX ORDER — NADOLOL 20 MG/1
40 TABLET ORAL DAILY
Status: DISCONTINUED | OUTPATIENT
Start: 2022-05-19 | End: 2022-05-27 | Stop reason: HOSPADM

## 2022-05-18 RX ORDER — LANOLIN ALCOHOL/MO/W.PET/CERES
200 CREAM (GRAM) TOPICAL 2 TIMES DAILY
Status: CANCELLED | OUTPATIENT
Start: 2022-05-18

## 2022-05-18 RX ORDER — ALPRAZOLAM 0.5 MG/1
0.5 TABLET ORAL DAILY PRN
Status: DISCONTINUED | OUTPATIENT
Start: 2022-05-18 | End: 2022-05-27 | Stop reason: HOSPADM

## 2022-05-18 RX ORDER — SODIUM CHLORIDE 0.9 % (FLUSH) 0.9 %
5-40 SYRINGE (ML) INJECTION EVERY 12 HOURS SCHEDULED
Status: CANCELLED | OUTPATIENT
Start: 2022-05-18

## 2022-05-18 RX ORDER — BISACODYL 10 MG
10 SUPPOSITORY, RECTAL RECTAL DAILY PRN
Status: DISCONTINUED | OUTPATIENT
Start: 2022-05-18 | End: 2022-05-27 | Stop reason: HOSPADM

## 2022-05-18 RX ORDER — ENOXAPARIN SODIUM 100 MG/ML
40 INJECTION SUBCUTANEOUS DAILY
Status: CANCELLED | OUTPATIENT
Start: 2022-05-18

## 2022-05-18 RX ORDER — LACTULOSE 10 G/15ML
20 SOLUTION ORAL 3 TIMES DAILY
Status: CANCELLED | OUTPATIENT
Start: 2022-05-18

## 2022-05-18 RX ORDER — FUROSEMIDE 40 MG/1
40 TABLET ORAL DAILY
Status: DISCONTINUED | OUTPATIENT
Start: 2022-05-18 | End: 2022-05-18 | Stop reason: HOSPADM

## 2022-05-18 RX ORDER — INSULIN LISPRO 100 [IU]/ML
0-6 INJECTION, SOLUTION INTRAVENOUS; SUBCUTANEOUS
Status: CANCELLED | OUTPATIENT
Start: 2022-05-18

## 2022-05-18 RX ORDER — POLYETHYLENE GLYCOL 3350 17 G/17G
17 POWDER, FOR SOLUTION ORAL DAILY PRN
Status: DISCONTINUED | OUTPATIENT
Start: 2022-05-18 | End: 2022-05-27 | Stop reason: HOSPADM

## 2022-05-18 RX ORDER — SPIRONOLACTONE 50 MG/1
100 TABLET, FILM COATED ORAL DAILY
Status: DISCONTINUED | OUTPATIENT
Start: 2022-05-20 | End: 2022-05-27

## 2022-05-18 RX ORDER — MAGNESIUM SULFATE 1 G/100ML
1000 INJECTION INTRAVENOUS PRN
Status: DISCONTINUED | OUTPATIENT
Start: 2022-05-18 | End: 2022-05-26

## 2022-05-18 RX ORDER — FUROSEMIDE 40 MG/1
40 TABLET ORAL DAILY
Status: CANCELLED | OUTPATIENT
Start: 2022-05-18

## 2022-05-18 RX ORDER — NADOLOL 20 MG/1
40 TABLET ORAL DAILY
Status: CANCELLED | OUTPATIENT
Start: 2022-05-18

## 2022-05-18 RX ORDER — PANTOPRAZOLE SODIUM 40 MG/1
40 TABLET, DELAYED RELEASE ORAL
Status: CANCELLED | OUTPATIENT
Start: 2022-05-19

## 2022-05-18 RX ORDER — INSULIN LISPRO 100 [IU]/ML
0-12 INJECTION, SOLUTION INTRAVENOUS; SUBCUTANEOUS
Status: DISCONTINUED | OUTPATIENT
Start: 2022-05-19 | End: 2022-05-26

## 2022-05-18 RX ORDER — SPIRONOLACTONE 50 MG/1
100 TABLET, FILM COATED ORAL DAILY
Status: CANCELLED | OUTPATIENT
Start: 2022-05-20

## 2022-05-18 RX ORDER — FUROSEMIDE 40 MG/1
40 TABLET ORAL DAILY
Status: DISCONTINUED | OUTPATIENT
Start: 2022-05-19 | End: 2022-05-27 | Stop reason: HOSPADM

## 2022-05-18 RX ORDER — PANTOPRAZOLE SODIUM 40 MG/1
40 TABLET, DELAYED RELEASE ORAL
Status: DISCONTINUED | OUTPATIENT
Start: 2022-05-19 | End: 2022-05-27 | Stop reason: HOSPADM

## 2022-05-18 RX ORDER — ALBUTEROL SULFATE 90 UG/1
2 AEROSOL, METERED RESPIRATORY (INHALATION) EVERY 4 HOURS PRN
Status: DISCONTINUED | OUTPATIENT
Start: 2022-05-18 | End: 2022-05-27 | Stop reason: HOSPADM

## 2022-05-18 RX ORDER — LEVOTHYROXINE SODIUM 0.05 MG/1
50 TABLET ORAL DAILY
Status: CANCELLED | OUTPATIENT
Start: 2022-05-18

## 2022-05-18 RX ORDER — HYDROCORTISONE ACETATE 25 MG/1
25 SUPPOSITORY RECTAL 2 TIMES DAILY
Status: DISCONTINUED | OUTPATIENT
Start: 2022-05-18 | End: 2022-05-26

## 2022-05-18 RX ORDER — LACTULOSE 10 G/15ML
20 SOLUTION ORAL 3 TIMES DAILY
Status: DISCONTINUED | OUTPATIENT
Start: 2022-05-18 | End: 2022-05-27 | Stop reason: HOSPADM

## 2022-05-18 RX ORDER — ENOXAPARIN SODIUM 100 MG/ML
40 INJECTION SUBCUTANEOUS DAILY
Status: DISCONTINUED | OUTPATIENT
Start: 2022-05-20 | End: 2022-05-26

## 2022-05-18 RX ADMIN — MAGNESIUM OXIDE 400 MG (241.3 MG MAGNESIUM) TABLET 200 MG: TABLET at 21:48

## 2022-05-18 RX ADMIN — HYDROCORTISONE ACETATE 25 MG: 25 SUPPOSITORY RECTAL at 21:49

## 2022-05-18 RX ADMIN — MAGNESIUM OXIDE 400 MG (241.3 MG MAGNESIUM) TABLET 200 MG: TABLET at 09:12

## 2022-05-18 RX ADMIN — LACTULOSE 20 G: 10 SOLUTION ORAL at 16:27

## 2022-05-18 RX ADMIN — PANTOPRAZOLE SODIUM 40 MG: 40 TABLET, DELAYED RELEASE ORAL at 05:57

## 2022-05-18 RX ADMIN — LEVOTHYROXINE SODIUM 50 MCG: 25 TABLET ORAL at 05:57

## 2022-05-18 RX ADMIN — HYDROCORTISONE ACETATE 25 MG: 25 SUPPOSITORY RECTAL at 09:12

## 2022-05-18 RX ADMIN — RIFAXIMIN 550 MG: 550 TABLET ORAL at 09:12

## 2022-05-18 RX ADMIN — RIFAXIMIN 550 MG: 550 TABLET ORAL at 21:48

## 2022-05-18 RX ADMIN — FUROSEMIDE 40 MG: 40 TABLET ORAL at 11:58

## 2022-05-18 RX ADMIN — SODIUM CHLORIDE, PRESERVATIVE FREE 10 ML: 5 INJECTION INTRAVENOUS at 21:49

## 2022-05-18 RX ADMIN — SPIRONOLACTONE 100 MG: 50 TABLET ORAL at 09:12

## 2022-05-18 RX ADMIN — SODIUM CHLORIDE, PRESERVATIVE FREE 10 ML: 5 INJECTION INTRAVENOUS at 09:12

## 2022-05-18 RX ADMIN — LACTULOSE 20 G: 10 SOLUTION ORAL at 21:48

## 2022-05-18 RX ADMIN — LACTULOSE 20 G: 10 SOLUTION ORAL at 05:57

## 2022-05-18 RX ADMIN — DULOXETINE HYDROCHLORIDE 60 MG: 30 CAPSULE, DELAYED RELEASE ORAL at 21:48

## 2022-05-18 RX ADMIN — ENOXAPARIN SODIUM 40 MG: 100 INJECTION SUBCUTANEOUS at 09:11

## 2022-05-18 ASSESSMENT — PAIN SCALES - GENERAL: PAINLEVEL_OUTOF10: 0

## 2022-05-18 ASSESSMENT — LIFESTYLE VARIABLES: HOW OFTEN DO YOU HAVE A DRINK CONTAINING ALCOHOL: NEVER

## 2022-05-18 NOTE — PROGRESS NOTES
Physical Therapy    Physical Therapy Treatment Note  Name: Kelly Latham MRN: [id number] :   1945   Date:  2022   Admission Date: 2022 Room:  [address] [place], [place] [address]A   Restrictions/Precautions:  Restrictions/Precautions  Restrictions/Precautions: Fall Risk,General Precautions  Subjective:  Patient states:  \"Your name is Benedict?? I thought you were [place]! \"  Pain:   Location, Type, Intensity (0/10 to 10/10):  denies  Objective:    Observation:  Supine, awake, alert, agreeable. He remains confused, however, is more easily redirected and demonstrates improvement in confusion since admission. Tele, pulse ox, bed alarmed, silva, peripheral IV in place. Treatment, including education/measures:  Therapeutic Activity Training:   Therapeutic activity training was instructed today. Cues were given for safety, sequence, UE/LE placement, awareness, and balance. Activities performed today included bed mobility training, sup-sit, sit-stand, SPT. Supine <-> sit: SBA  Seated balance: good  Sit <-> stand: SBA ; 6 reps t/o for initiation of functional mobility, linen mgmt, strength activities  Standing balance: fair  Stand <-> sit: CGA ; assisted to BR to void  Positioned for comfort and pressure relief ; all needs met, call light in reach, gait belt for OOB, left in chair, chair alarmed      Gait Training:  Cues were given for safety, sequence, device management, balance, posture, awareness, path. 125 + 100 + 100 ft using FWW at [facility] improving towards SBA ; dec in francois w/ onset of fatigue, inc postural sway w/ reliance on BL UE support, dec step height BL, inconsistent but improving step patterning, impaired AD utilization leading to intermittent safety concerns ; inc time and effort, seated rest breaks x 3 for ~ 3 mins between trials    Assessment / Impression: Tolerating inc ambulation distances w/o signiticant inc in difficulty. Pt is participatory and pleasant t/o, movement requiring less effort.  Balance remains limiting factor, cognition intermittently leading to unsafe quality functional mobility, slight dec strength/power affecting transfers/transition efficiency, confusion/disorientation cont but is improving, endurance limiting continuous OOB/ambulation. Will cont to benefit from further skilled, intensive, subacute therapy services on DC.      Patient's tolerance of treatment:  good  Barriers to improvement:  cognition  Plan for Next Session:    Cont w/ est POC and DC plan (ARU)    Time in:  1040  Time out:  1126  Timed treatment minutes:  42 (GT x 2)  Total treatment time:  55    Previously filed items:  Social/Functional History  Lives With: Spouse  Type of Home: House  Home Layout: One level,Performs ADL's on one level  Home Access: Stairs to enter with rails  Entrance Stairs - Number of Steps: 5  Entrance Stairs - Rails: Both  Bathroom Shower/Tub: Walk-in shower  Bathroom Toilet: Standard  Bathroom Accessibility: Accessible  Has the patient had two or more falls in the past year or any fall with injury in the past year?: Yes (2, tripped on dog, injured both (non hospital))  Receives Help From: Family (2 granddaughters)  ADL Assistance: Independent  Homemaking Assistance: Independent  Homemaking Responsibilities: Yes  Ambulation Assistance: Independent  Transfer Assistance: Independent  Active : Yes  Patient Goals   Patient goals : return to [address]  Time Frame for Long term goals : 1 week  Long term goal 1: pt will complete bed mobility at min A  Long term goal 2: pt will complete transfers at [address] [facility] term goal 3: pt will ambulate 50 ft using FWW at [place] [address]       Electronically signed by:    Sandeep Kramer PT, DPT  5/18/2022, 1:09 PM

## 2022-05-18 NOTE — DISCHARGE SUMMARY
Discharge summary    Patient:  Charity Landau  Unit/Bed:3012/3012-A   YOB: 1945       MRN: [id number] Acct: [de-identified]  PCP: Steve Ma MD    Date of Admission: 5/2/2022  --------------------------  Discharge Date:   5/18/2022    Admitting Physician: Zuleyma Cohen MD     Discharge Physician: Zuleyma Cohen MD       Consults:     IP CONSULT TO HOSPITALIST  IP CONSULT TO INTERVENTIONAL RADIOLOGY  IP CONSULT TO PULMONOLOGY  IP CONSULT TO GI  IP CONSULT TO INTERVENTIONAL RADIOLOGY  IP CONSULT TO CARDIOLOGY  IP CONSULT TO PHARMACY  IP CONSULT TO DIETITIAN  IP CONSULT TO INFECTIOUS DISEASES  IP CONSULT TO NEUROLOGY  IP CONSULT TO NEPHROLOGY    Disposition:    Inpatient rehab    Condition at Discharge: Stable    Code Status:  Full Code          Patient Instructions:    Discharge lab/important testing/finding that need follow up :     Patient follow-up with pulmonary for thoracentesis        Activity: activity as tolerated    Diet: ADULT ORAL NUTRITION SUPPLEMENT; Lunch, Dinner; Frozen Oral Supplement  ADULT DIET; Regular        Transfer medication to inpatient rehab  Follow-up visits:   No follow-up provider specified.        Infusion Medications    sodium chloride Stopped (05/06/22 1431)    sodium chloride Stopped (05/10/22 0144)    sodium chloride       Scheduled Medications    furosemide  40 mg Oral Daily    pantoprazole  40 mg Oral QAM AC    hydrocortisone  25 mg Rectal BID    insulin lispro  0-12 Units SubCUTAneous TID WC    insulin lispro  0-6 Units SubCUTAneous 2 times per day    enoxaparin  40 mg SubCUTAneous Daily    lidocaine PF  5 mL IntraDERmal Once    sodium chloride flush  5-40 mL IntraVENous 2 times per day    DULoxetine  60 mg Oral Daily    lactulose  20 g Oral TID    levothyroxine  50 mcg Oral Daily    magnesium oxide  200 mg Oral BID    nadolol  40 mg Oral Daily    rifAXIMin  550 mg Oral BID    spironolactone  100 mg Oral Daily     PRN Meds: magnesium sulfate, sodium chloride, sodium chloride, albuterol sulfate HFA, sodium chloride, nicotine polacrilex, acetaminophen **OR** acetaminophen, ondansetron, ALPRAZolam      Patient seen and examined in the day of discharge. Vital signs reviewed. BP (!) 171/81   Pulse 84   Temp 98.2 °F (36.8 °C) (Oral)   Resp 19   Ht 5' 7.99\" (1.727 m)   Wt 244 lb 8 oz (110.9 kg)   SpO2 97%   BMI 37.18 kg/m²     Patient alert,   Oriented to self, place, nonfocal neurological exam, no tremor. Diminished air entry in the right side of the lung, normal first and second heart sound, no lower extremity edema, abdomen soft and nontender. *. Patient reached the maximum benefit of this hospitalization. Patient  was hemodynamically stable on discharge   Available consultant are in agreement with discharge planning. Patient symptoms was controlled and in agreement with discharge planning.        Time Spent on discharge is 45 minutes in the examination, evaluation, counseling and review of medications and discharge plan    Chief Complaint:      [address] [place] [address] [place] Course:     Luke Givens is a 78 y.o. male hospitalized on 5/2/2022   for shortness of breath, acute respiratory failure, found to have ascites, hydrothorax, he required intubation, mechanical ventilation secondary to acute encephalopathy and pleural effusions      problem list as below       Acute hypoxic respiratory failure, multifactorial  -S/p intubation 5/5/2022 through 5/11/2022  Currently off oxygen    Recurrent pleural effusion  -Status postthoracentesis 5/5/2022 (removal of 2 L) 5/12/2022 with removal of 1.4 L suspected to be hydrothorax from liver cirrhosis  Repeated chest x-ray showed reaccumulation  -Not candidate for TIPS being the hepatic encephalopathy per GI.  -Discussed with pulmonary service, lencho for discharge to inpatient rehab with a plan for thoracentesis tomorrow    Decompensated liver cirrhosis (alcohol) with ascites  -Evaluated by gastroenterology service in consultation  -Status post paracentesis of 6.9 L 5/5/2022, no SBP  -Lasix and Aldactone continued  -Outpatient follow-up with GI      Acute hepatic encephalopathy  Improved,  Required sitter during his hospitalization  Stable on lactulose and rifaximin      Acute kidney injury  Evaluated by nephrology service in consultation  Diuretics was initially held, received judicious IV fluid, creatinine improved  Patient restarted on Lasix 40 mg daily, to restart Aldactone again in 2 days    Obesity  CKD stage III  Hypothyroidism  GERD, restarted BiPAP      Patient evaluated by physical, occupational therapy and discharged to inpatient rehabilitation           ----------------      Subjective:     Patient seen and examined  Overnight events noted  RN and ancillary staff note reviewed  Has no complaint today, more alert oriented, no major overnight events      Diet: ADULT ORAL NUTRITION SUPPLEMENT; Lunch, Dinner; Frozen Oral Supplement  ADULT DIET; Regular    OBJECTIVE     Exam:  BP (!) 171/81   Pulse 84   Temp 98.2 °F (36.8 °C) (Oral)   Resp 19   Ht 5' 7.99\" (1.727 m)   Wt 244 lb 8 oz (110.9 kg)   SpO2 97%   BMI 37.18 kg/m²           Gen: Not in distress. More alert and oriented. Head: Normocephalic. Atraumatic. Eyes: Conjunctivae/corneas clear. ENT: Oral mucosa moist  Neck: No JVD. No obvious thyromegaly. CVS: Nml S1S2, no murmur  , RRR  Pulmomary: Diminished air entry in the right side. Gastrointestinal: Soft, non tender, minimal distention, positive bowel sound  Musculoskeletal: No edema. Warm  Neuro: No asterixis no focal deficit. Moves extremity spontaneously.   Psychiatry: Slightly flat affect      Medications:  Reviewed    Infusion Medications    sodium chloride Stopped (05/06/22 1431)    sodium chloride Stopped (05/10/22 0144)    sodium chloride       Scheduled Medications    furosemide  40 mg Oral Daily    pantoprazole  40 mg Oral QAM AC    hydrocortisone  25 mg Rectal BID    insulin lispro  0-12 Units SubCUTAneous TID     insulin lispro  0-6 Units SubCUTAneous 2 times per day    enoxaparin  40 mg SubCUTAneous Daily    lidocaine PF  5 mL IntraDERmal Once    sodium chloride flush  5-40 mL IntraVENous 2 times per day    DULoxetine  60 mg Oral Daily    lactulose  20 g Oral TID    levothyroxine  50 mcg Oral Daily    magnesium oxide  200 mg Oral BID    nadolol  40 mg Oral Daily    rifAXIMin  550 mg Oral BID    spironolactone  100 mg Oral Daily     PRN Meds: magnesium sulfate, sodium chloride, sodium chloride, albuterol sulfate HFA, sodium chloride, nicotine polacrilex, acetaminophen **OR** acetaminophen, ondansetron, ALPRAZolam      Intake/Output Summary (Last 24 hours) at 5/18/2022 1145  Last data filed at 5/18/2022 0616  Gross per 24 hour   Intake 1212.77 ml   Output 800 ml   Net 412.77 ml             Labs:   Recent Labs     05/17/22  0722 05/18/22  0805   WBC 9.3 11.2*   HGB 13.8 12.5*   HCT 43.2 38.9*    232     Recent Labs     05/17/22  0722 05/18/22  0805    138   K 4.2 4.7   CL 96* 101   CO2 29 28   BUN 37* 32*   CREATININE 1.6* 1.3   CALCIUM 10.1 10.1   PHOS 3.6 3.3     Recent Labs     05/17/22  0722 05/18/22  0805   AST 63* 53*   ALT 55* 46*   BILITOT 0.7 0.6   ALKPHOS 159* 150*     Recent Labs     05/17/22  0722 05/18/22  0805   INR 1.08 1.12     No results for input(s): Ban Miller in the last 72 hours. Urinalysis:      Lab Results   Component Value Date    NITRU NEGATIVE 05/17/2022    WBCUA 6 05/17/2022    BACTERIA NEGATIVE 05/17/2022    RBCUA 82 05/17/2022    BLOODU LARGE 05/17/2022    SPECGRAV 1.025 05/17/2022       Radiology:  XR CHEST PORTABLE   Final Result   Now complete opacification of the right hemithorax. XR CHEST PORTABLE   Final Result   Patient is status post thoracentesis without discrete pneumothorax. Intervally removed enteric and endotracheal tubes. Similar opacification of the right hemithorax.          US CHEST INCLUDING MEDIASTINUM   Final Result   Large right pleural effusion. XR CHEST PORTABLE   Final Result   Complete opacification of right hemithorax which appears slightly progressed   in comparison to 05/10/2022, with decreased aeration at the right lung apex. As previously, this may represent a combination of layering pleural fluid,   atelectasis, and/or airspace disease      Similar small left pleural effusion. No significant interval change in tube and line position. XR CHEST PORTABLE   Final Result   Diffuse opacities are seen within the right lung. Findings could be related   to layering of a right pleural effusion and adjacent atelectasis/infiltrate. This is not significantly changed. Pulmonary vascular congestion. Tip of the ET tube is 2.5 cm above the berny. CT HEAD WO CONTRAST   Final Result   No acute intracranial abnormality. US CHEST INCLUDING MEDIASTINUM   Final Result   Right pleural effusion. XR CHEST PORTABLE   Final Result   Increased hazy opacification of the right lung, favored to represent a   combination of progressed atelectasis and layering pleural fluid. Progressive underline airspace disease such as pneumonia is not excluded. Endotracheal tube appears retracted, now measuring about 7.3 cm above the   berny. This may be related in part to patient position. XR CHEST PORTABLE   Final Result   No interval change of right-sided airspace opacity and probable bilateral   pleural effusions. Endotracheal tube tip is 3.3 cm above the berny. XR ABDOMEN (KUB) (SINGLE AP VIEW)   Final Result   No radiopaque urinary collecting system calculus evident. Unremarkable bowel gas pattern. Unremarkable fecal burden. XR CHEST PORTABLE   Final Result   Diffuse opacities are seen within the right lung without significant change. Tip of the ET tube is 5.9 cm above the berny.          XR CHEST PORTABLE Final Result   Bilateral pleural effusions and airspace opacities. Endotracheal tube tip is approximately 5 cm above the berny. XR CHEST PORTABLE   Final Result   Improved aeration of the right lung field compared to 05/04/2022 with   persistent large areas of increased opacification right apical and right   mid-basilar regions consistent with underlying pulmonary consolidation,   mass/neoplasm, residual pleural fluid or thickening or asymmetric elevation   of left hemidiaphragm. No pneumothorax or procedure related complication   suggested      Consolidating infiltrate and/or moderate-sized pleural effusion left basilar   region. Stable borderline cardiomegaly, satisfactory position of nasogastric and   endotracheal tubes, soft tissue density right paratracheal region consistent   with mass/neoplasm, lymphadenopathy or area pulmonary consolidation measuring   5.5 cm in greatest cephalocaudal dimension. US CHEST INCLUDING MEDIASTINUM   Final Result   Ultrasound guidance provided for right thoracentesis. XR CHEST PORTABLE   Final Result   Near complete opacification of the right hemithorax with mildly improved   aeration. Mildly increased left-sided interstitial opacities favoring interstitial   edema. Unchanged position of support devices. CT ABDOMEN PELVIS WO CONTRAST Additional Contrast? None   Final Result   Complete opacification of the right hemithorax with a large right pleural   effusion, with compressive atelectasis of the right lung, and shift of the   mediastinum to the left. Minor atelectasis noted in the left lung base. Large amount of ascites. Evidence of the cirrhosis. Stable left renal cyst.      Stable mesenteric and retroperitoneal lymphadenopathy without change since   2019. XR ABDOMEN FOR NG/OG/NE TUBE PLACEMENT   Final Result   Satisfactory position lines and catheters. Right sided chest opacification.   Question extrinsic to the patient         [address]   Final Result   Large right pleural effusion. CT HEAD WO CONTRAST   Final Result   1. Motion compromised study but no acute intracranial abnormality. I would   suggest follow-up if symptoms persist.         IR US GUIDED PARACENTESIS   Final Result   Successful ultrasound-guided paracentesis with specimen sent for laboratory   evaluation. US DUP ABD PEL RETRO SCROT COMPLETE   Final Result   1. Portal and hepatic veins appear grossly patent. 2. Cirrhosis without focal liver lesion demonstrated. 3. Small volume ascites. XR CHEST PORTABLE   Final Result   Significant progression of opacification of the right hemithorax, which is   now complete. This likely represent progressed pleural effusion and   atelectasis. Likely trace left pleural effusion, similar to prior         XR CHEST PORTABLE   Final Result   1. Status post right thoracentesis with new partial aeration of the right   lung. Large pleural effusion persists. 2. Small left pleural effusion. IR GUIDED THORACENTESIS PLEURAL   Final Result   Successful ultrasound guided right thoracentesis with specimen sent for   laboratory evaluation. .         XR CHEST PORTABLE   Final Result   Complete opacification of the right hemithorax likely due to a large right   pleural effusion and atelectasis.          CTA CHEST ABDOMEN PELVIS W CONTRAST    (Results Pending)               Electronically signed by Nevin Cabrera MD on 5/18/2022 at 11:45 AM

## 2022-05-18 NOTE — H&P
Mary Duran    : 1945  [facility]  MRN: [id number]              History and physical      Admitting diagnosis: Hepatic encephalopathy ( [address])    Comorbid diagnoses impacting rehabilitation: Generalized weakness, gait disturbance, dysphagia, acute respiratory failure with hypoxia, pleural effusions, alcoholic cirrhosis, acute on chronic kidney disease, hypothyroidism, obesity (BMI 37.3)    Chief complaint: Fatigue, sleepiness and dyspnea. History of present illness: The patient is a 79-year-old right-hand-dominant male with known alcoholic cirrhosis who presented to our ED on 2022 with several days of progressive shortness of breath. He reported there was some relief with side-lying when trying to breathe but overall he was deteriorating rapidly. Imaging identified significant pleural effusion and ascites. On 2022 he had 6.9 L of ascites removed through paracentesis. That day he also had 2 L of fluid removed from his pleural cavity with thoracentesis. A week later on 2022 he had 1.4 L of fluid pulled off his chest once again. He has had persistent fatigue, generalized weakness and dyspnea. He has developed impaired swallowing, cognition and generalized weakness. He has been diagnosed with an hepatic encephalopathy. He has become too weak to do his own toileting, transfers and self-care and cannot return directly home. He requires inpatient rehabilitation to address these issues. Of note is that reaccumulation of his pleural effusions will require additional thoracentesis. Tentatively this is scheduled for 2022 while he is on the rehab unit. Review of systems: . The remainder of their review of systems was negative except as mentioned in the history of present illness.     Social History:Lives With: Spouse  Type of Home: House  Home Layout: One level  Home Access: Stairs to enter with rails  Entrance Stairs - Number of Steps: 4 CORAZON front of the home and 5 CORAZON at the back of the home. HRs c both sets of stairs. Entrance Stairs - Rails: Both  Bathroom Shower/Tub: Walk-in shower  Bathroom Toilet: Standard  Bathroom Equipment:  (Per Pt's spouse, Pt has no equipment in the home.)  Bathroom Accessibility: Accessible  Has the patient had two or more falls in the past year or any fall with injury in the past year?: Yes (Pt reports 2 falls in the last year--fall when pulled by dog. Second fall on back porch due to LOB and stairs.)  ADL Assistance: Independent  Homemaking Assistance: Needs assistance  Homemaking Responsibilities: Yes  Meal Prep Responsibility: No (Wife completes.)  Laundry Responsibility: No (Wife completes.)  Cleaning Responsibility: No (Wife completes.)  Bill Paying/Finance Responsibility: No (Wife completes.)  Shopping Responsibility: No (Wife completes.)  Health Care Management: Primary (Pt manages medications c 7-day pillbox.)  Ambulation Assistance: Independent  Transfer Assistance: Independent  Education: 8th grade; some difficulty in school  Occupation: Retired  Type of Occupation:   Leisure & Hobbies: Likes to work on cars, cats and a [place] monika, wife does grocery shopping, out to eat. Pt manages medications c 7-day pillbox. Wife manages bills. Pt manages yardwork  IADL Comments: Likes to surf the internet  Additional Comments: Pt reports 2 falls in the last year--fall when pulled by dog. Second fall on back porch due to LOB and stairs. He reports that he quit smoking about 46 years ago. He has a 14.00 pack-year smoking history. He has never used smokeless tobacco. He reports previous alcohol use. He reports that he does not use drugs. Prior (baseline) level of function: Modified independent with mobility and self-care. Current level of function: Maximum physical assistance for mobility and self-care.     Allergies:  Lisinopril, Zetia [ezetimibe], Atorvastatin, Codeine, Hydrochlorothiazide, Hydroxyzine, Lexapro [escitalopram 20 g, Oral, TID, Derrell Chatman MD    [START ON 5/19/2022] levothyroxine (SYNTHROID) tablet 50 mcg, 50 mcg, Oral, Daily, Derrell Chatman MD    magnesium oxide (MAG-OX) tablet 200 mg, 200 mg, Oral, BID, Derrell Chatman MD    magnesium sulfate 1000 mg in dextrose 5% 100 mL IVPB, 1,000 mg, IntraVENous, PRN, MD Danny Mccoy  [START ON 5/19/2022] nadolol (CORGARD) tablet 40 mg, 40 mg, Oral, Daily, MD Danny Mccoy  [START ON 5/19/2022] pantoprazole (PROTONIX) tablet 40 mg, 40 mg, Oral, QAM AC, Derrell Chatman MD    rifAXIMin (XIFAXAN) tablet 550 mg, 550 mg, Oral, BID, Derrell Chatman MD    sodium chloride flush 0.9 % injection 5-40 mL, 5-40 mL, IntraVENous, 2 times per day, MD Danny Mccoy  Blue [place]oras ON 5/20/2022] spironolactone (ALDACTONE) tablet 100 mg, 100 mg, Oral, Daily, Derrell Chatman MD    polyethylene glycol (GLYCOLAX) packet 17 g, 17 g, Oral, Daily PRN, MARK Hylton MD    bisacodyl (DULCOLAX) suppository 10 mg, 10 mg, Rectal, Daily PRN, MARK Hylton MD    Family History:   Family History   Problem Relation Age of Onset    Hypertension Brother     Diabetes Other        Exam:    Blood pressure (!) 182/71, pulse 99, temperature 98.2 °F (36.8 °C), temperature source Oral, resp. rate 19, height 5' 8\" (1.727 m), weight 245 lb 9.5 oz (111.4 kg). General: Side lying in bed. Sleepy and oriented only x2. Very hard of hearing. HEENT: MMM. Soft-spoken. No JVD. Guarded cervical rotation bilaterally. Pulmonary: Diminished breath sounds bilaterally, worse on the left. Infrequent coughing. No rales. Cardiac: Regular rate and rhythm. Abdomen: Patient's abdomen was soft and nondistended. Bowel sounds were present throughout. There was no rebound, guarding or masses noted. Spinal exam: Moist skin. No open areas. Guarded trunk rotation with general weakness and girth issues. Upper extremities: No deep tendon reflexes, tremor or bruising. Poor  strength. Fatigues quickly with MMT. Clumsy. Lower extremities: Trace swelling. Tenderness with palpation about the ankles. Give way with MMT. No signs of DVT. Sitting balance was poor. Standing balance was poor. Lab Results   Component Value Date    WBC 11.2 (H) 05/18/2022    HGB 12.5 (L) 05/18/2022    HCT 38.9 (L) 05/18/2022    .8 (H) 05/18/2022     05/18/2022     Lab Results   Component Value Date    INR 1.12 05/18/2022    INR 1.08 05/17/2022    INR 1.12 05/15/2022    PROTIME 14.4 05/18/2022    PROTIME 13.9 05/17/2022    PROTIME 14.4 05/15/2022     Lab Results   Component Value Date    CREATININE 1.3 05/18/2022    BUN 32 (H) 05/18/2022     05/18/2022    K 4.7 05/18/2022     05/18/2022    CO2 28 05/18/2022     Lab Results   Component Value Date    ALT 46 (H) 05/18/2022    AST 53 (H) 05/18/2022    ALKPHOS 150 (H) 05/18/2022    BILITOT 0.6 05/18/2022         Impression: 79-year-old male with a history of alcoholic cirrhosis, chronic kidney disease and hypothyroidism with obesity who has suffered mental status changes, generalized weakness and dysphagia associated with hepatic encephalopathy and further liver compromise. He has had nearly 7 L of ascites drained from his abdomen and over 2 occasions, nearly 3-1/2 L of pleural effusion. Strengths for the patient: Supportive spouse, accessible home. Limitations/barriers for the patient: His depressed mental status, his severe liver disease and his apathy. Recommendation: Acute inpatient rehabilitation with occupational and physical therapy 180 minutes 5 out of every 7 days. Will address basic and  advancing mobility with self-care instruction and adaptive equipment training. Caregiver education will be offered. Expected length of stay  prior to a supervised level of function for discharge home with a walker and C OT/PT is 2-1/2 weeks. Additional recommendation:    1.  Hepatic encephalopathy with gait disturbance: Patient requires daily occupational and physical therapy with speech-language pathology. We must try to maximize medical care of his liver disease so that he can be as alert as possible to retain new information in his training. He needs adaptive equipment training, caregiver education and DVT prophylaxis. Graduated schedule of activities to build tolerance. Nutritional support. Ongoing management by GI and the interventionalists. 2. DVT prophylaxis: Lovenox 40 mg subcu daily will be used but it is on hold this first 24 hours because he is going to have a procedure tomorrow (thoracentesis). Weightbearing activities will be pursued daily. I must periodically monitor his hemoglobin and platelet count while on this medication. GI prophylaxis is available. 3. Acute respiratory failure with hypoxia: Thoracentesis tomorrow. Management of the liver disease that is causing the effusion buildup. Bronchodilators periodically as needed. Monitoring his oxygen levels at rest and with activity. 4. Acute on chronic kidney disease: Avoiding wide swings of blood pressure. Avoiding nephrotoxic medications when possible. Nephrology to monitor his recovery. Periodic monitoring of his chemistries. 5. Alcoholic cirrhosis: Xifaxan and Chronulac. Abstinence from alcohol. Avoiding acetaminophen. Diuretics for the ascites. GI to monitor his need for further paracentesis. I personally performed a history and physical on this patient within 24 hours of admission to the rehab unit. I have reviewed the preadmission screening and concur with its findings without change. A detailed plan of care will be established by hospital day 4 and I attest the patient is appropriate for inpatient rehabilitation at this time. I have compared the patient's current functional status noted during my history and physical with that of the preadmission screen and I have found no significant differences.

## 2022-05-18 NOTE — CARE COORDINATION
Patient meets criteria and is approved to come to ARU. Patient able to admit once medically stable and after ARU Medical Director and  sign the pre-admission screen (PAS), pending rapid COVID results. Notified MD and CM of approval.  Per MD patient is medically ready for discharge. Rapid COVID order placed per order from MD.      4475:  Negative COVID test noted. Notified ARU charge of negative COVID and admission to ARU. Patient able to admit after ARU Medical Director and  sign the pre-admission screen (PAS).

## 2022-05-18 NOTE — PROGRESS NOTES
Pulmonary and Critical Care  Progress Note      VITALS:  BP (!) 171/81   Pulse 84   Temp 98.2 °F (36.8 °C) (Oral)   Resp 19   Ht 5' 7.99\" (1.727 m)   Wt 244 lb 8 oz (110.9 kg)   SpO2 97%   BMI 37.18 kg/m²     Subjective:   CHIEF COMPLAINT :SOB     HPI:                The patient is sitting in the chair. He is not in acute resp distress    Objective:   PHYSICAL EXAM:    LUNGS:decreased air entry right base  Abd-distended, BS+,NT  Ext- no pedal edema  CVS-s1s2, no murmurs      DATA:    CBC:  Recent Labs     05/17/22  0722 05/18/22  0805   WBC 9.3 11.2*   RBC 4.37* 3.86*   HGB 13.8 12.5*   HCT 43.2 38.9*    232   MCV 98.9 100.8*   MCH 31.6* 32.4*   MCHC 31.9* 32.1   RDW 14.3 14.4   SEGSPCT 67.5* 76.4*      BMP:  Recent Labs     05/17/22  0722      K 4.2   CL 96*   CO2 29   BUN 37*   CREATININE 1.6*   CALCIUM 10.1   GLUCOSE 130*      ABG:  No results for input(s): PH, PO2ART, ZBE4QER, HCO3, BEART, O2SAT in the last 72 hours. BNP  No results found for: BNP   D-Dimer:  Lab Results   Component Value Date    DDIMER 1606 (H) 09/13/2021      1.  Radiology: Now complete opacification of the right hemithorax      Assessment/Plan     Patient Active Problem List    Diagnosis Date Noted    CANDIE (acute kidney injury) ([address]      Priority: Medium    Acute respiratory failure ([address] 05/04/2022     Priority: Medium    Acute on chronic respiratory failure with hypoxemia (HCC)      Priority: Medium    Recurrent right pleural effusion 05/02/2022     Priority: Medium    Sepsis ([address] 03/19/2022    Hepatic encephalopathy ([address] 03/19/2022    Hearing loss 03/11/2022    Cirrhosis of liver with ascites ([address]     Secondary esophageal varices without bleeding (HCC)     Gastric polyps     SOB (shortness of breath) 09/13/2021    Portal hypertension ([address] 08/30/2021    JENNIFER (obstructive sleep apnea) 08/09/2021    Bilateral hearing loss 05/07/2021    Increased ammonia level 05/07/2021    Class 3 severe obesity with body mass index (BMI) of 40.0 to 44.9 in adult [facility]) 09/21/2020    Hyperglycemia 12/04/2019    Acquired hypothyroidism 12/04/2019    Pulmonary nodules 36/43/8480    Alcoholic cirrhosis of liver with ascites (HCC)-Dr Jacques 08/08/2019    Mixed hyperlipidemia 08/08/2019    Hypertension 08/08/2019    History of alcohol abuse 08/08/2019    Gastroesophageal reflux disease without esophagitis 08/08/2019    Anxiety 08/08/2019    Shortness of breath 05/26/2019    History of colonic polyps 01/15/2019   Acute Hypoxic resp failure- improving  Recurrent Right pleural effusion 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 JENNIFER  VDRF  Toxic Metabolic encephalopathy- improving  Moderate Malnutrition       1. Diuresis  2. Right thoracentesis in am  3. ICS  4. OOB  5. Keep sats > 92%  6. PT/OT  7. Await TIPS eval  8. Possible Liver transplant evaluation if he is a candidate  9. Chest tube is contraindicated in Hepatic Hydrothorax  10.  C/w present management    Electronically signed by Wayne White MD on 5/18/2022 at 9:57 AM

## 2022-05-18 NOTE — PROGRESS NOTES
 enoxaparin  40 mg SubCUTAneous Daily    lidocaine PF  5 mL IntraDERmal Once    sodium chloride flush  5-40 mL IntraVENous 2 times per day    DULoxetine  60 mg Oral Daily    lactulose  20 g Oral TID    levothyroxine  50 mcg Oral Daily    magnesium oxide  200 mg Oral BID    [Held by provider] nadolol  40 mg Oral Daily    rifAXIMin  550 mg Oral BID    spironolactone  100 mg Oral Daily      Infusions:    sodium chloride Stopped (05/06/22 1431)    norepinephrine Stopped (05/11/22 1030)    sodium chloride Stopped (05/10/22 0144)    sodium chloride           Objective:   Vitals: BP (!) 160/75   Pulse 98   Temp 98 °F (36.7 °C) (Oral)   Resp 20   Ht 5' 7.99\" (1.727 m)   Wt 244 lb 8 oz (110.9 kg)   SpO2 93%   BMI 37.18 kg/m²   General appearance: Patient is extubated and off ventilator 5/11/2022 for discontinued NG tube feeding and NG tube  Neck: no JVD or bruit  Thyroid : Normal lobes   Lungs: Has Vesicular Breath sounds related diminished breath sound right-sided pleural effusion was aspirated earlier but it and again reaccumulated  Heart:  regular rate and rhythm  Abdomen: soft, non-tender; bowel sounds normal; no masses,  no organomegaly  Musculoskeletal: Normal  Extremities: extremities normal, , no edema  Neurologic:  Patient is extubated and off ventilator seem to be more alert.   But seem to be confused talking some nonissue    Assessment:     Patient Active Problem List:     Shortness of breath     History of colonic polyps     Alcoholic cirrhosis of liver with ascites (HCC)-Dr Jacques     Mixed hyperlipidemia     Hypertension     History of alcohol abuse     Gastroesophageal reflux disease without esophagitis     Anxiety     Pulmonary nodules     Hyperglycemia     Acquired hypothyroidism     Class 3 severe obesity with body mass index (BMI) of 40.0 to 44.9 in adult [facility])     Bilateral hearing loss     Increased ammonia level     JENNIFER (obstructive sleep apnea)     Portal hypertension (HCC)     SOB (shortness of breath)     Cirrhosis of liver with ascites (HCC)     Secondary esophageal varices without bleeding (HCC)     Gastric polyps     Hearing loss     Sepsis (HCC)     Hepatic encephalopathy (HCC)     Pleural effusion     Acute on chronic respiratory failure with hypoxemia (HCC)     Acute respiratory failure ([facility]      Plan:     1. Reviewed POC blood glucose . Labs and X ray results   2. Reviewed Current Medicines   3. On correction bolus Humalog insulin  4. Monitor Blood glucose frequently   5. Modified  the dose of Insulin/ other medicines as needed  6. Reviewed noted from GI for asciters/ Cirrhosis and Hydrothorax   7. Patient blood glucose are fairly well any insulin requirements   8. I will sign out the case    .      Jorge Joe MD, MD

## 2022-05-18 NOTE — CARE COORDINATION
Per orders MD placed removal of 1:1 sitter and for  to be placed at 1330 yesterday (5/17). Presented patient to Dr. Theresa Baltazar. Awaiting response. Will notify CM was determination has been made. Patient will need a rapid COVID ordered placed once patient is closer to being medically ready.

## 2022-05-18 NOTE — CARE COORDINATION
Pt possibly going to ARU. Waiting for Dr. Delisa Oliver to make a final decision in regards to accepting pt.

## 2022-05-18 NOTE — PROGRESS NOTES
Teresa 10, suggesting prerenal  S/p 1L NS overnight  Right Pleural effusion likely from ascites, avoid overdiuresis please  LVP or thoracenteses prn      A&P:  -CANDIE: cr 1.7 from 1.1 on admission//ddx: volume depletion vs ATN  -Alcoholic Cirrhosis  -Ascites   -Bilateral pleural effusion,   -Right pleural effusion likely from ascites

## 2022-05-18 NOTE — PLAN OF CARE
Problem: Discharge Planning  Goal: Discharge to home or other facility with appropriate resources  5/18/2022 1451 by Marion Loaiza RN  Outcome: Completed  5/18/2022 0910 by Idris Hong LPN  Outcome: Progressing

## 2022-05-18 NOTE — PROGRESS NOTES
GI NOTE:   Still has dyspnea  CXR again shows large right pleural effusion    Impression:               4) cirrhosis with ascites and likely PSE              2) pleural effusion- likely hepatic hydrothorax                    Plan:              2) not a great candidate for TIPS as likely has some underlying PSE and this will be aggravated by TIPS   2) according to guidelines from [employer] (liver society) one should avoid pleurodesis or chest tube in this situation but can use a Pleur-X catheter if needed

## 2022-05-18 NOTE — CARE COORDINATION
Pt can go to ARU the afternoon. Pt can go after he has been 24 hours frees form a sitter. Pt will need a rapid COVID and discharge to readmit orders. Doctor is aware.

## 2022-05-18 NOTE — PROGRESS NOTES
4 Eyes Skin Assessment     NAME:  Lloyd Camp OF BIRTH:  1945  MEDICAL RECORD NUMBER:  [id number]    The patient is being assess for  Admission    I agree that 2 RN's have performed a thorough Head to Toe Skin Assessment on the patient. ALL assessment sites listed below have been assessed. Areas assessed by both nurses:    Head, Face, Ears, Shoulders, Back, Chest, Arms, Elbows, Hands, Sacrum. Buttock, Coccyx, Ischium and Legs. Feet and Heels        Does the Patient have a Wound?  No noted wound(s)       Andre Prevention initiated:  Yes   Wound Care Orders initiated:  NA    Pressure Injury (Stage 3,4, Unstageable, DTI, NWPT, and Complex wounds) if present place consult order under [de-identified] No    New and Established Ostomies if present place consult order under : NA      Nurse 1 eSignature: Tommy Marroquin RN    **SHARE this note so that the co-signing nurse is able to place an eSignature**    Nurse 2 eSignature: Kaushik Mon LPN

## 2022-05-19 ENCOUNTER — APPOINTMENT (OUTPATIENT)
Dept: GENERAL RADIOLOGY | Age: 77
DRG: 947 | End: 2022-05-19
Attending: PHYSICAL MEDICINE & REHABILITATION
Payer: MEDICARE

## 2022-05-19 LAB
FLUID TYPE: NORMAL INDEX
FREE KAPPA LT CHAINS,UR: 688.93 MG/L (ref 0–32.9)
FREE LAMBDA LT CHAINS,UR: 47.91 MG/L (ref 0–3.79)
GLUCOSE BLD-MCNC: 110 MG/DL (ref 70–99)
GLUCOSE BLD-MCNC: 113 MG/DL (ref 70–99)
GLUCOSE BLD-MCNC: 118 MG/DL (ref 70–99)
GLUCOSE BLD-MCNC: 119 MG/DL (ref 70–99)
GLUCOSE BLD-MCNC: 141 MG/DL (ref 70–99)
GLUCOSE, FLUID: 125 MG/DL
HOURS COLLECTED: ABNORMAL
INTERPRETATION: ABNORMAL
LACTATE DEHYDROGENASE, FLUID: 80 IU/L
LYMPHOCYTES, BODY FLUID: 53 %
MESOTHELIAL FLUID: 5 /100 WBC
MONOCYTE, FLUID: 37 %
NEUTROPHIL, FLUID: 10 %
OTHER CELLS FLUID: 0
PH FLUID: 7.5
PROTEIN FLUID: 2.8 G/DL
PROTEIN, TOTAL URINE: ABNORMAL MG/D
RBC FLUID: NORMAL /CU MM
URINE FREE KAPPA EXCRETION/DAY: ABNORMAL MG/D
URINE FREE LAMBDA EXCRETION/DAY: ABNORMAL MG/D
VOLUME: ABNORMAL
WBC FLUID: 546 /CU MM

## 2022-05-19 PROCEDURE — 84157 ASSAY OF PROTEIN OTHER: CPT

## 2022-05-19 PROCEDURE — 83986 ASSAY PH BODY FLUID NOS: CPT

## 2022-05-19 PROCEDURE — 87070 CULTURE OTHR SPECIMN AEROBIC: CPT

## 2022-05-19 PROCEDURE — 1280000000 HC REHAB R&B

## 2022-05-19 PROCEDURE — 97530 THERAPEUTIC ACTIVITIES: CPT

## 2022-05-19 PROCEDURE — 82962 GLUCOSE BLOOD TEST: CPT

## 2022-05-19 PROCEDURE — 71045 X-RAY EXAM CHEST 1 VIEW: CPT

## 2022-05-19 PROCEDURE — 82945 GLUCOSE OTHER FLUID: CPT

## 2022-05-19 PROCEDURE — 83615 LACTATE (LD) (LDH) ENZYME: CPT

## 2022-05-19 PROCEDURE — 85610 PROTHROMBIN TIME: CPT

## 2022-05-19 PROCEDURE — 99232 SBSQ HOSP IP/OBS MODERATE 35: CPT | Performed by: PHYSICAL MEDICINE & REHABILITATION

## 2022-05-19 PROCEDURE — 87205 SMEAR GRAM STAIN: CPT

## 2022-05-19 PROCEDURE — 32555 ASPIRATE PLEURA W/ IMAGING: CPT

## 2022-05-19 PROCEDURE — 2709999900 HC NON-CHARGEABLE SUPPLY

## 2022-05-19 PROCEDURE — 84100 ASSAY OF PHOSPHORUS: CPT

## 2022-05-19 PROCEDURE — C1729 CATH, DRAINAGE: HCPCS

## 2022-05-19 PROCEDURE — 97163 PT EVAL HIGH COMPLEX 45 MIN: CPT

## 2022-05-19 PROCEDURE — 92523 SPEECH SOUND LANG COMPREHEN: CPT

## 2022-05-19 PROCEDURE — 6370000000 HC RX 637 (ALT 250 FOR IP): Performed by: HOSPITALIST

## 2022-05-19 PROCEDURE — 94150 VITAL CAPACITY TEST: CPT

## 2022-05-19 PROCEDURE — 94761 N-INVAS EAR/PLS OXIMETRY MLT: CPT

## 2022-05-19 PROCEDURE — 89051 BODY FLUID CELL COUNT: CPT

## 2022-05-19 PROCEDURE — 97116 GAIT TRAINING THERAPY: CPT

## 2022-05-19 PROCEDURE — 2580000003 HC RX 258: Performed by: HOSPITALIST

## 2022-05-19 PROCEDURE — 0W993ZZ DRAINAGE OF RIGHT PLEURAL CAVITY, PERCUTANEOUS APPROACH: ICD-10-PCS | Performed by: INTERNAL MEDICINE

## 2022-05-19 RX ADMIN — HYDROCORTISONE ACETATE 25 MG: 25 SUPPOSITORY RECTAL at 20:29

## 2022-05-19 RX ADMIN — SODIUM CHLORIDE, PRESERVATIVE FREE 10 ML: 5 INJECTION INTRAVENOUS at 08:45

## 2022-05-19 RX ADMIN — HYDROCORTISONE ACETATE 25 MG: 25 SUPPOSITORY RECTAL at 08:42

## 2022-05-19 RX ADMIN — PANTOPRAZOLE SODIUM 40 MG: 40 TABLET, DELAYED RELEASE ORAL at 05:41

## 2022-05-19 RX ADMIN — LACTULOSE 20 G: 10 SOLUTION ORAL at 20:28

## 2022-05-19 RX ADMIN — NADOLOL 40 MG: 20 TABLET ORAL at 08:42

## 2022-05-19 RX ADMIN — MAGNESIUM OXIDE 400 MG (241.3 MG MAGNESIUM) TABLET 200 MG: TABLET at 08:42

## 2022-05-19 RX ADMIN — LACTULOSE 20 G: 10 SOLUTION ORAL at 05:42

## 2022-05-19 RX ADMIN — SODIUM CHLORIDE, PRESERVATIVE FREE 10 ML: 5 INJECTION INTRAVENOUS at 20:32

## 2022-05-19 RX ADMIN — LEVOTHYROXINE SODIUM 50 MCG: 0.05 TABLET ORAL at 05:41

## 2022-05-19 RX ADMIN — RIFAXIMIN 550 MG: 550 TABLET ORAL at 20:28

## 2022-05-19 RX ADMIN — DULOXETINE HYDROCHLORIDE 60 MG: 30 CAPSULE, DELAYED RELEASE ORAL at 20:28

## 2022-05-19 RX ADMIN — RIFAXIMIN 550 MG: 550 TABLET ORAL at 08:42

## 2022-05-19 RX ADMIN — INSULIN LISPRO 1 UNITS: 100 INJECTION, SOLUTION INTRAVENOUS; SUBCUTANEOUS at 20:33

## 2022-05-19 RX ADMIN — MAGNESIUM OXIDE 400 MG (241.3 MG MAGNESIUM) TABLET 200 MG: TABLET at 20:28

## 2022-05-19 RX ADMIN — FUROSEMIDE 40 MG: 40 TABLET ORAL at 08:42

## 2022-05-19 ASSESSMENT — PAIN SCALES - GENERAL: PAINLEVEL_OUTOF10: 0

## 2022-05-19 NOTE — PLAN OF CARE
ARU Interdisciplinary Plan of Care (IPOC)  [facility] Dr. Daniele Barrios [facility], [address]  [phone]  Fax: [phone]        Celina Gastelum    : 1945  Acct #: [de-identified]  MRN: [id number]   PHYSICIAN:  Raquel Beauchamp MD  Primary Active Problems:   Active Hospital Problems    Diagnosis Date Noted    Hepatic encephalopathy [facility]) [K72.90] 2022       Rehabilitation Diagnosis:     Hepatic failure, unspecified with coma [K72.91]  Hepatic encephalopathy [facility]) [K72.90]          CARE PLAN     NURSING:  Celina Gastelum while on this unit will:      Bowel and Bladder   [x] Be continent of bowel and bladder      [] Have an adequate number of bowel movements   [] Urinate with no urinary retention >300ml in bladder   [] Bladder Scan: (details)   [] Complete bladder protocol with silva removal   [] Initiate Bladder Program to toilet every ___ hours   [] Initiate Bowel Program to toilet every ___hours   [] Bladder training    [] Bowel training  Pulmonary   [x] Maintain O2 SATs at 92% or greater  Pain Management   [x] Have pain managed while on ARU        [x] Be pain free by discharge    [x] Medication Management and Education  Maintenance of Skin Integrity/Wound Management   [x] Have no skin breakdown while on ARU   [] Have improved skin integrity via wound measurements   [] Have no signs/symptoms of infection via infection protection and monitoring at the          wound site  Fall Prevention   [x] Be free from injury during hospitalization via fall prevention measures     [x] Disease management and Education  Precautions   [] Weight Bearing Precautions   [] Swallowing Precautions   [x] Monitoring of Risks of Complications   [x] DVT Prophylaxis    [x] Fluid/electrolyte/Nutrition Management    [x] Complete education with patient/family with understanding demonstrated for          in-room safety with transfers to bed, toilet, wheelchair, shower as well as                bathroom activities and hygiene. [x] Adjustment   [x] Other:   Nursing interventions may include bowel/bladder training, education for medical assistive devices, medication education, O2 saturation management, energy conservation, stress management techniques, fall prevention, alarms protocol, seating and positioning, skin/wound care, pressure relief instruction,dressing changes,  infection protection, DVT prophylaxis, and/or assistance with in room safety with transfers to bed, toilet, wheelchair, shower as well as bathroom activities and hygiene. Patient/caregiver education for:   [] Disease/sustained injury/management      [] Medication Use   [] Surgical intervention   [] Safety/Precautions   [] Body mechanics and or joint protection   [] Health maintenance         PHYSICAL THERAPY:  Goals:                               Long Term Goals  Time Frame for Long term goals : 7-10 tx days:  Long term goal 1: Pt will complete bed mobility (scooting, rolling R/L, and sup<->sit) Ind.  Long term goal 2: Pt will complete OOB transfers Mod Ind-Ind. Long term goal 3: Pt will ambulate >/=150 ft over level surface and at least 10 ft of uneven surface using 2WW Mod Ind. Long term goal 4: Pt will ascend/descend curb step using 2WW and 1 flight of stairs using railings Mod Ind. Long term goal 5: Pt will complete object retrieval from the floor with 2WW and reacher Mod Ind. These goals were reviewed with this patient at the time of assessment and Daphane Brittle is in agreement. Plan of Care: Pt to be seen 5 days per week for a minimum of 60 minutes for 7-10 days.                 Current Treatment Recommendations: Strengthening,Balance training,Functional mobility training,Transfer training,Cognitive/Perceptual training,Endurance training,Gait training,Stair training,Cognitive reorientation,Home exercise program,Safety education & training,Patient/Caregiver education & training,Equipment evaluation, education, & procurement,Therapeutic activities community reintegration,animal assisted therapy, and concurrent/group therapy.     PT IRF-VAMSI scores and goals for initial assessment:   Bed Mobility:   Sit to Lying  Assistance Needed: Supervision or touching assistance  Comment: SBA-Sup without use of bed features with additional assist on silva catheter management  CARE Score: 4  Discharge Goal: Independent  Roll Left and Right  Assistance Needed: Supervision or touching assistance  Comment: SBA-Sup without use of bed features; required additional assist on silva catherer management prior to rolling to R as silva catheter pre-positioned on LLE  CARE Score: 4  Discharge Goal: Independent  Lying to Sitting on Side of Bed  Assistance Needed: Supervision or touching assistance  Comment: SBA without use of bed features towards L side of bed where silva catheter is pre-positioned; reports mild lightneadedness upon sitting up but no trunk swaying observed  CARE Score: 4  Discharge Goal: Independent    Transfers:    Sit to Stand  Assistance Needed: Supervision or touching assistance  Comment: CGA with 2WW with additional assist on silva catheter and O2 line management  CARE Score: 4  Discharge Goal: Independent  Chair/Bed-to-Chair Transfer  Assistance Needed: Supervision or touching assistance  Comment: CGA with 2WW with additional assist on silva catheter and O2 line management  CARE Score: 4  Discharge Goal: Independent  Toilet Transfer  Assistance needed: Supervision or touching assistance  Comment: CGA with 2WW and grab bars with additional assist on O2 line management  CARE Score: 4  Car Transfer  Assistance Needed: Supervision or touching assistance  Comment: CGA with 2WW with additional assist on silva catheter and O2 line management  CARE Score: 4  Discharge Goal: Independent    Ambulation:    Walking Ability  Does the Patient Walk?: Yes     Walk 10 Feet  Assistance Needed: Supervision or touching assistance  Comment: CGA using 2WW with additional assist on O2 line management  CARE Score: 4  Discharge Goal: Independent     Walk 50 Feet with Two Turns  Assistance Needed: Supervision or touching assistance  Comment: CGA using 2WW with additional assist on O2 line management  CARE Score: 4  Discharge Goal: Independent     Walk 150 Feet  Assistance Needed: Supervision or touching assistance  Comment: CGA using 2WW with additional assist on O2 line management  CARE Score: 4  Discharge Goal: Independent     Walking 10 Feet on Uneven Surfaces  Assistance Needed: Supervision or touching assistance  Comment: CGA using 2WW with additional assist on O2 line management  CARE Score: 4  Discharge Goal: Independent     1 Step (Curb)  Assistance Needed: Supervision or touching assistance  Comment: CGA using 2WW with additional assist on O2 line management  CARE Score: 4  Discharge Goal: Independent     4 Steps  Assistance Needed: Supervision or touching assistance  Comment: CGA using railings (usually with step-through pattern) on ascent/descent of 4\"/6\" step heights; required additional assist on O2 line and silva catheter management  CARE Score: 4  Discharge Goal: Independent     12 Steps  Assistance Needed: Supervision or touching assistance  Comment: CGA using railings (usually with step-through pattern) on ascent/descent of 4\"/6\" step heights; required additional assist on O2 line and silva catheter management  CARE Score: 4  Discharge Goal: Independent    Gait Deviations: []None []Slow francois  [] Increased LORENZO  [] Staggers []Deviated Path  [] Decreased step length  [] Decreased step height  []Decreased arm swing  [] Shuffles  [] Decreased head and trunk rotation  []other:        Wheelchair:  w/c Ability: Wheelchair Ability  Uses a Wheelchair and/or Scooter?: No                Balance:        Object: Picking Up Object  Assistance Needed: Supervision or touching assistance  Comment: CGA with 2WW and reacher  CARE Score: 4  Discharge Goal: Independent  OCCUPATIONAL THERAPY:  Goals:             Short Term Goals  Time Frame for Short term goals: STGs=LTGs :  Long Term Goals  Time Frame for Long term goals : 7-10 days or until d/c. Long Term Goal 1: Pt will self-feed c Ind. Long Term Goal 2: Pt will complete grooming tasks c Mod I.  Long Term Goal 3: Pt will complete total body bathing c AE PRN and Mod I.  Long Term Goal 4: Pt will complete UB dressing c Ind. Long Term Goal 5: Pt will complete LB dressing c AE PRN and Mod I. Additional Goals?: Yes  Long Term Goal 6: Pt will doff/don footwear c AE PRN and Mod I.  Long Term Goal 7: Pt will complete toileting c Mod I.  Long Term Goal 8: Pt will perform functional transfers (bed, chair, toilet, shower) c DME PRN and Mod I.  Long Term Goal 9: Pt will perform therex/therax to facilitate an increase in strength/endurance/ax tolerance (c emphasis on dynamic standing balance/tolerance > 8 mins) c Mod I.  Long Term Goal 10: Pt will complete oral hygiene c Mod I. :    These goals were reviewed with this patient at the time of assessment and Luke Givens is in agreement    Plan of Care:  Pt to be seen 5 days per week for a minimum of 60 minutes for 10 days. Plan  Times per Day: Daily  Current Treatment Recommendations: Strengthening,Balance training,Functional mobility training,Endurance training,Pain management,Safety education & training,Patient/Caregiver education & training,Self-Care / ADL,Home management training,Neuromuscular re-education,Cognitive reorientation,Equipment evaluation, education, & procurement,Coordination training,Cognitive/Perceptual training         cognitive training, home management, energy conservation training, community reintegration, splint fabrication, patient/caregiver education and training, animal assisted therapy, and concurrent and/or group therapy.       OT IRF-VAMSI scores and goals for initial assessment:    ADLs:    Eating: Eating  Assistance Needed: Setup or clean-up assistance  Comment: Pt reports he has had some difficulty opening packages/containers. CARE Score: 5  Discharge Goal: Independent       Oral Hygiene: Oral Hygiene  Assistance Needed: Supervision or touching assistance  Comment: Pt required verbal cues to terminate task. CARE Score: 4  Discharge Goal: Independent    UB/LB Bathing: Shower/Bathe Self  Assistance Needed: Supervision or touching assistance  Comment: Pt required Max verbal cues for sequencing UB/LB bathing. Pt able to complete sink side washing up c CG/SBA. Pt seated for majority. CARE Score: 4  Discharge Goal: Independent    UB Dressing: Upper Body Dressing  Assistance Needed: Supervision or touching assistance  Comment: Pt required cues to manage O2 line when donning/doffing pullover tshirt. CARE Score: 4  Discharge Goal: Independent         LB Dressing: Lower Body Dressing  Assistance Needed: Partial/moderate assistance  Comment: Pt required Min A d/t needing assist c silva cath into depends and pants. CARE Score: 3  Discharge Goal: Independent    Donning and [place] Footwear: Putting On/Taking Off Footwear  Assistance Needed: Supervision or touching assistance  Comment: Supervision to doff/don socks using figure four method. CARE Score: 4  Discharge Goal: Independent      Toiletin [address] needed: Supervision or touching assistance  Comment: CGA to manage depends. CARE Score: 4  Discharge Goal: Independent      Toilet Transfers: Toilet Transfer  Assistance needed: Supervision or touching assistance  Comment: CGA c 2WW and grab bars. Pt also required verbal cues for O2 line management.   CARE Score: 4  Discharge Goal: Independent      SPEECH THERAPY: (If ordered)  Plan of Care and Goals:   LTG       Duration/Frequency of Treatment  Duration of Treatment: 3x/week x2 weeks 30 mins min                       Short-term Goals  Timeframe for Short-term Goals: 3x/week x2 weeks 30 mins min  LTG: Pt will use strategies to improve comprehension of daily tasks for improved overall safety. Pt will improve cognition and memory for safe return home with spouse with minimal cues. Goal 1: Complete cog assessment by 5/23/22  Goal 2: Pt will improve multistep sequencing in fx improving from max to mod to min cues. Goal 3: Pt will improve recall of learned safety both verbally and fx with min cues  Goal 4: Pt will attend to tasks with good eye contact and request assist when not understanding with min to no cues for 15 mins  Goal 5: Pt will improve verbal and fx problem solving with min cues. Timeframe for Short-term Goals: 3x/week x2 weeks 30 mins min  LTG: Pt will use strategies to improve comprehension of daily tasks for improved overall safety. Pt will improve cognition and memory for safe return home with spouse with minimal cues. LTG:                           Treatments may include speech/language/communication therapy, cognitive training, animal assisted therapy, group therapy, education, and/or dysphagia therapy based on the above goals. Co-treats where appropriate with PT or OT to facilitate patient goals in functional tasks. These goals were reviewed with this patient at the time of assessment and Aleisha Alvarez is in agreement. CASE MANAGEMENT:  Goals:   Assist patient/family with discharge planning, patient/family counseling, assistance in obtaining recommended equipment and other services, and coordination with insurance during ARU stay. Patient Goals:   Return to maximum level of independent function. Activities Prior to Admit:   Homemaking Responsibilities: Yes  Active : Yes  Mode of Transportation: Car,Truck,Motorcycle  Occupation: Retired  Leisure & Hobbies: Likes to work on cars, cats and a [place] monika, wife does grocery shopping, out to eat. Pt manages medications c 7-day pillbox. Wife manages bills. Pt manages yardwork Intensity of Therapy  Kanika Galicia will be seen a minimum of 3 hours of therapy per day/a minimum of 5 out of 7 days per week. [] In this rare instance due to the nature of this patient's medical involvement, this patient will be seen 15 hours per week (900 minutes within a 7 day period). Treatments may include therapeutic exercises, gait training, neuromuscular re-ed, transfer training, community reintegration, bed mobility, w/c mobility and training, self care, home mgmt, cognitive training, energy conservation,dysphagia tx, speech/language/communication therapy, group therapy, and patient/family education. In addition, dietician/nutritionist may monitor calorie count as well as intake and collaboratively work with SLP on dietary upgrades. Neuropsychology/Psychology may evaluate and provide necessary support. Group therapy as appropriate to facilitate improved endurance, STR, COORD, function, safety, transfers, awareness and insight into deficits, problem solving, memory, and social interaction and engagement. Medical issues being managed closely and that require 24 hour availability of a physician:   [] Swallowing Precautions                                     [] Weight bearing precautions   [x] Wound Care                             [x] Infection Prevention   [x] DVT Prophylaxis/assessment              [x] Monitoring for complications    [x] Fall Precautions/Prevention                         [x] Fluid/Electrolyte/Nutrition Balance   [] Voice Protection                           [x] Medication Management   [x] Respiratory                   [x] Pain Mgmt   [x] Bowel/Bladder Fx    Medical Prognosis: [] Good  [x] Fair    [] Guarded   Total expected IRF days 18                                            Physician anticipated functional outcomes:  FWW and HHC RN/OT/PT and supervision.   Rehab Goals:   [] Return to premorbid function of_______________________________.    [] Independent   [] Mod I available. 3. Acute respiratory failure with hypoxia: Thoracentesis tomorrow. Management of the liver disease that is causing the effusion buildup. Bronchodilators periodically as needed. Monitoring his oxygen levels at rest and with activity. 4. Acute on chronic kidney disease: Avoiding wide swings of blood pressure. Avoiding nephrotoxic medications when possible. Nephrology to monitor his recovery. Periodic monitoring of his chemistries. 5. Alcoholic cirrhosis: Xifaxan and Chronulac. Abstinence from alcohol. Avoiding acetaminophen. Diuretics for the ascites. GI to monitor his need for further paracentesis.     Anticipated discharge destination:    [] Home Independently   [x] Home with supervision    []SNF     [] Other       This plan has been reviewed with me in a language I understand. I have had the opportunity to include my input with my therapy team.    ________________________________________________   ______________________  Patient/Significant Other      Date    I have reviewed this initial plan of care and agree with its contents:    Title   Name    Date    Time    Physician: Segun Gonsalves OT 5/20/2022 9:31 AM    Case Mgmt:  Aung Juve 05/19/2022 1151    OT: TROY Johnson, OTR/L 05/20/22 0931    PT: Pablito Amaro PT   05/19/2022    15:53    RN: Pj Velasquez RN 5/19/2022 0007    ST: Jann Horn.  [facility] [place], [place], [address] [place] [address] 5/19/2022  3:46 PM      Dietician:     ADMIT DATE:5/18/2022

## 2022-05-19 NOTE — PROGRESS NOTES
Facility/Department: [address] [place] [address]  Initial Speech/Language/Cognitive Assessment    NAME: Hoa Buchanan  : 1945   MRN: [id number]  ADMISSION DATE: 2022  ADMITTING DIAGNOSIS: has Shortness of breath; History of colonic polyps; Alcoholic cirrhosis of liver with ascites (HCC)-Dr Jacques; Mixed hyperlipidemia; Hypertension; History of alcohol abuse; Gastroesophageal reflux disease without esophagitis; Anxiety; Pulmonary nodules; Hyperglycemia; Acquired hypothyroidism; Class 3 severe obesity with body mass index (BMI) of 40.0 to 44.9 in [facility]); Bilateral hearing loss; Increased ammonia level; JENNIFER (obstructive sleep apnea); Portal hypertension (HCC); SOB (shortness of breath); Cirrhosis of liver with ascites ([address] Secondary esophageal varices without bleeding ([address] Gastric polyps; Hearing loss; Sepsis ([address] Hepatic encephalopathy ([address] Recurrent right pleural effusion; Acute on chronic respiratory failure with hypoxemia ([address] Acute respiratory failure ([address] and CANDIE (acute kidney injury) ([address] on their problem list.  DATE ONSET: 22    Date of Eval: 2022   Evaluating Therapist: MARISELA Whitlock    RECENT RESULTS  CT OF HEAD/MRI:   FINDINGS:   BRAIN/VENTRICLES: There is no acute intracranial hemorrhage, mass effect or   midline shift.  No abnormal extra-axial fluid collection.  The gray-white   differentiation is maintained without evidence of an acute infarct.  There is   no evidence of hydrocephalus.       ORBITS: The visualized portion of the orbits demonstrate no acute abnormality.       SINUSES: The visualized paranasal sinuses and mastoid air cells demonstrate   no acute abnormality.       SOFT TISSUES/SKULL:  No acute abnormality of the visualized skull or soft   tissues.           Impression   No acute intracranial abnormality. Primary Complaint:  I just feel like I'm more off than usual.  It's hard to keep up with everything.  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 Pain:  Pain Assessment  Pain Assessment: None - Denies Pain  Pain Level: 0  Patient's Stated Pain Goal: 0 - No pain    Assessment: Per PAS: Kelly Latham is a 78 y.o. male with pmh of hypertension, alcoholic cirrhosis with recurrent ascites and portal hypertension, who presented on 5/2 with shortness of breath. Patient reported worsening shortness of breath 5 days PTA. He could not even lay flat at home. Patient denied a cough. No chills and no fever. Patient visited ED today. His chest x-ray showed total opacity of right hemithorax. He was also found to hypoxia. On 5/2 patient underwent right thoracentesis 2200ml removed. Acute respiratory failure 2/2 pleural effusion. On 5/5 patient became confused and more hypoxic and was vented at that time. Patient intubated 5/5-5/11Patient also being medically managed for portal HTN, hypovolemic shock, alcoholic cirrhosis. Patient lives with spouse and is IND with ADL's and ambulation. Currently patient is min-maxA with ADL's and min-modA with SW for transfers and ambulation. COVID negative test noted on 5/18. Medical/Surgical History   Anxiety    Cirrhosis, alcoholic (HCC)    GERD (gastroesophageal reflux disease) 2000  GERD (gastroesophageal reflux disease)    Hyperlipidemia    Hypertension    Portal hypertension (HCC)    Pulmonary nodules    Sleep apnea    SOB (shortness of breath  Radiology Findings  Impression Complete opacification of the right hemithorax likely due to a large right pleural effusion and atelectasis. Cognitive Diagnosis: Definite cognitive deficits with reduced attn, Douglas with lots of distortions if not receiving direct facial cues, memory deficits, reduced processing, initiation, sequencing, and problem solving. Ongoing assessment will be completed  Communication Diagnosis: Significant hearing deficits that pt feels is worse (Menieres disease) that impact comprehension overall. Recommendations:  Requires SLP Intervention: Yes  Duration of Treatment: 3x/week x2 weeks 30 mins min  D/C Recommendations: To be determined       Plan:   Goals:  Short-term Goals  Timeframe for Short-term Goals: 3x/week x2 weeks 30 mins min  LTG: Pt will use strategies to improve comprehension of daily tasks for improved overall safety. Pt will improve cognition and memory for safe return home with spouse with minimal cues. Goal 1: Complete cog assessment by 5/23/22  Goal 2: Pt will improve multistep sequencing in fx improving from max to mod to min cues. Goal 3: Pt will improve recall of learned safety both verbally and fx with min cues  Goal 4: Pt will attend to tasks with good eye contact and request assist when not understanding with min to no cues for 15 mins  Goal 5: Pt will improve verbal and fx problem solving with min cues. Patient/family involved in developing goals and treatment plan: pt in agreement    Subjective:   Previous level of function and limitations: Erick Shipley was independent with activities of daily living prior to admit. General  Chart Reviewed:  (O2 via nasal cannula 2L)  Family / Caregiver Present: No  Subjective  Subjective: Lethargic but responsive  Social/Functional History  Education: 8th grade; some difficulty in school  Type of Occupation:   Leisure & Hobbies: Likes to work on cars, cats and a Maltese monika, wife does grocery shopping, out to eat. Unable to determine med mgmt. Wife manages bills. Pt manages yardwork  IADL Comments: Likes to surf the internet  Additional Comments: Pt reports 2 falls in the last year--fall when pulled by dog. Second fall on back porch due to LOB and stairs.   Vision  Vision: Within Functional Limits  Hearing  Hearing Exceptions: Hard of hearing/hearing concerns (Pt has Menieres Disease--reports hearing aides don't work)           Objective: MAST          Auditory Comprehension  Comprehension: Exceptions (Pt stated \"Menieres disease has affected how I understand what someone is trying to tell me. \")  Basic Questions: WFL  One Step Commands: WFL  Multistep Commands: Mild (reduced attn and hearing)  L/R Discrimination: WFL  Conversation: Moderate (due to hearing deficits and attn)    Reading Comprehension  Reading Status: Within functional limits (Paragraph)    Expression  Primary Mode of Expression: Verbal    Verbal Expression  Verbal Expression: Within functional limits              Pragmatics/Social Functioning  Pragmatics: Exceptions to [facility]  Eye Contact: Mild    Cognition:      Orientation  Overall Orientation Status: Within Functional Limits  Attention  Attention: Exceptions to [facility]  Alternating Attention: Mild  Divided Attention: Mild  Memory  Memory: Exceptions to [facility]  Short-term Memory: Mild  Working Memory: Mild  Numeric Reasoning  Numeric Reasoning: Exceptions to [facility]     Additional Assessments: BIMS  Understanding Verbal and Non-Verbal Content: Sometimes understands  Expression of Ideas and Wants: Some difficulty  Cognitive Pattern Assessment Used: BIMS  BIMS Summary Score: 13 out of 15 cognitive score  The Kitchen Picture Test (KPT)provides an accurate indication of current judgment skill; however, it is not designed to make definitive cognitive diagnoses. The KPT Practical Judgment Subtest measures judgment as a specific executive function. The subtest score indicates the patient's ability to recognize problems and think through appropriate solutions. The Total KPT score is a broader test that combines practical judgment with communication skills and visual memory. Because impairment in verbal skills and visual memory are often seen in persons with advanced dementia, the Total KPT score can be useful when there are concerns about larger questions of independent living and safety. Cassie Mckeon achieved a total score of 17/21    This KPT Practical Judgment Subtest score indicates  judgment.  Persons with this score typically indicate if there are  problems with executive functions and could support an underlying dementia. Charity Landau achieved a total score of 5/8    Results show reduced problem solving and attn to detail. Prognosis:  Speech Therapy Prognosis  Prognosis: Good  Prognosis Considerations: Motivation; Family/Community Support;Previous Level of Function;Participation Level; Potential  Individuals consulted  Consulted and agree with results and recommendations: Patient    Education:  Patient Education: results and rehab expectations  Patient Education Response: Needs reinforcement          Therapy Time:   Individual Concurrent Group Co-treatment   Time In 1100         Time Out 1143         Min[place]  5/19/2022 3:44 PM

## 2022-05-19 NOTE — PROGRESS NOTES
Therapy Coordinator Note    5/19/2022    Pt seen for PT and ST this date. Taken down for thoracentesis with attempt by OT to complete eval upon return. Unable to arouse pt to complete full assessment. Will continue tomorrow. Variance from 180 mins due to thoracentesis and poor arousal.  Will work on make up mins this week. Mansoor Foy.  Nabeel Patino, Therapy Coordinator   5/19/2022  3:27 PM

## 2022-05-19 NOTE — PROGRESS NOTES
PROCEDURE PERFORMED:     PRIMARY INDICATION FOR PROCEDURE:     INFORMED CONSENT:  Obtained prior to procedure. Consent placed in chart. JOVITA SCORE PRE PROCEDURE: 9       PT TRANSPORTED FROM: 1011                                   TO THE IR ROOM:   Large @ 1240                     ASSESSMENT: WNL    BARRIER PRECAUTIONS & STERILE TECHNIQUE:               Pt positioned for comfort. Warm blankets given. Pt placed on Cardiac Monitor. Pt and draped in a sterile fashion with chlorhexadine.     PAIN/LOCAL ANESTHESIA/SEDATION MANAGEMENT:           Local: Lidocaine 1% given by Dr. Stormy Kwong          Sedation:              Fentanyl:             Versed:   TIMEOUT 1250  STAFF: Dr Leonardo Caicedo RN, [facility] RN    INTRAOPERATIVE:           ACCESS TIME: 1250          USIMAGES: 6          ONE STEP           FINAL IMAGE TAKEN TO CONFIRM PLACEMENT OF: CATHETER          CONTRAST/CC:     STERILE DRESSINGS: APPLIED BY CECIL LAGUNAS    SPECIMENS: 700 ml of cloudy orange pleural flyud removed and sent to the lab  2200 Total fluid removed    EBL:    <1cc      FOLLOW- UP X-RAY: YES    COMPLICATIONS:NA  JOVITA SCORE POST PROCEDURE: 9         REPORT CALLED TO: Lobito Gonsalez RN

## 2022-05-19 NOTE — CARE COORDINATION
Case Management Admission Note      Patient:Boby Sanchez      QI  ARZ:[id number]  Rehab Dx/Hx: Hepatic failure, unspecified with coma [K72.91]  Hepatic encephalopathy ([facility] [K72.90]    Chief Complaint:   Past Medical History:   Diagnosis Date    Anxiety     Cirrhosis, alcoholic ([facility]     Diabetes mellitus ([facility]     GERD (gastroesophageal reflux disease)     GERD (gastroesophageal reflux disease)     Hyperlipidemia     Hypertension     Portal hypertension (HCC)     Pulmonary nodules     Sleep apnea     SOB (shortness of breath)     Thyroid disease      Past Surgical History:   Procedure Laterality Date    APPENDECTOMY      CHOLECYSTECTOMY      COLONOSCOPY      ENDOSCOPY, COLON, DIAGNOSTIC      FOOT SURGERY      needle removed,not sure which foot, per wife.  TONSILLECTOMY      UPPER GASTROINTESTINAL ENDOSCOPY N/A 2022    EGD BIOPSY performed by Erica Rogers MD at [address] [place] [address]       Allergies   Allergen Reactions    Lisinopril Swelling     Tongue swelling      Zetia [Ezetimibe] Swelling     Facial swelling    Atorvastatin     Codeine Other (See Comments)     Blood pressure drops    Hydrochlorothiazide     Hydroxyzine      Fatigue and depressed    Lexapro [Escitalopram Oxalate]      Increased depression    Pravastatin      Precautions: falls    Date of Admit: 2022  Room #: 1011/1011-A      Current functional status at time of admit:        Home Living/DME Available:                             IADL Hx:            Occupation: Retired  Leisure & Hobbies: Likes to work on cars, cats and a Lao monika, wife does grocery shopping, out to eat. Unable to determine med mgmt. Wife manages bills. Pt manages yardwork       Spouse: Abiodun Cisneros  Family:    Comments:  Case mgt met with patient in room. Patient very [address] and distractible. Patient plans dc 1-level home with his spouse. He reports that she's retired, but works occasionally.   He was unable to describe the type of work she does. Patient was a .  Patient has no dc concerns at this time. He is interested in [facility] - describes a home gym setup. Wears CPAP or BiPAP PTA - he can't recall which one.      Westley Pace, 5/19/2022, 11:52 AM

## 2022-05-19 NOTE — PROGRESS NOTES
Occupational Therapy    OT attempted to see Pt at 1315 and was informed Pt had left for thoracentesis. Pt was scheduled for thoracentesis today, however exact time was unknown. This OT was able to speak to Pt's wife briefly regarding Pt's PLOF. OT attempted to see Pt again at 1409 upon return from thoracentesis. Rhianna Worthington, patient care assistant and Pt's wife in room c Pt asleep in bed. Pt was twitching, snoring, and unable to wake up to attend to therapy or vitals by Rhianna Worthington. Unable to complete Pt's OT evaluation this date d/t two attempts without ability to work c Pt.       Kareem Preciado, TROY, OTR/L 05/19/22 0531

## 2022-05-20 LAB
ALBUMIN SERPL-MCNC: 3 GM/DL (ref 3.4–5)
ALP BLD-CCNC: 136 IU/L (ref 40–129)
ALT SERPL-CCNC: 35 U/L (ref 10–40)
ANION GAP SERPL CALCULATED.3IONS-SCNC: 6 MMOL/L (ref 4–16)
AST SERPL-CCNC: 41 IU/L (ref 15–37)
BILIRUB SERPL-MCNC: 0.6 MG/DL (ref 0–1)
BUN BLDV-MCNC: 30 MG/DL (ref 6–23)
CALCIUM SERPL-MCNC: 10.4 MG/DL (ref 8.3–10.6)
CHLORIDE BLD-SCNC: 96 MMOL/L (ref 99–110)
CO2: 32 MMOL/L (ref 21–32)
CREAT SERPL-MCNC: 1.6 MG/DL (ref 0.9–1.3)
CULTURE: ABNORMAL
GFR AFRICAN AMERICAN: 51 ML/MIN/1.73M2
GFR NON-AFRICAN AMERICAN: 42 ML/MIN/1.73M2
GLUCOSE BLD-MCNC: 105 MG/DL (ref 70–99)
GLUCOSE BLD-MCNC: 110 MG/DL (ref 70–99)
GLUCOSE BLD-MCNC: 111 MG/DL (ref 70–99)
GLUCOSE BLD-MCNC: 113 MG/DL (ref 70–99)
GLUCOSE BLD-MCNC: 114 MG/DL (ref 70–99)
GLUCOSE BLD-MCNC: 126 MG/DL (ref 70–99)
GRAM SMEAR: ABNORMAL
LACTATE DEHYDROGENASE: 191 IU/L (ref 120–246)
Lab: ABNORMAL
POTASSIUM SERPL-SCNC: 4.7 MMOL/L (ref 3.5–5.1)
SODIUM BLD-SCNC: 134 MMOL/L (ref 135–145)
SPECIMEN: ABNORMAL
TOTAL PROTEIN: 7.4 GM/DL (ref 6.4–8.2)

## 2022-05-20 PROCEDURE — 82962 GLUCOSE BLOOD TEST: CPT

## 2022-05-20 PROCEDURE — 80053 COMPREHEN METABOLIC PANEL: CPT

## 2022-05-20 PROCEDURE — 82784 ASSAY IGA/IGD/IGG/IGM EACH: CPT

## 2022-05-20 PROCEDURE — 2700000000 HC OXYGEN THERAPY PER DAY

## 2022-05-20 PROCEDURE — 6370000000 HC RX 637 (ALT 250 FOR IP): Performed by: HOSPITALIST

## 2022-05-20 PROCEDURE — 82232 ASSAY OF BETA-2 PROTEIN: CPT

## 2022-05-20 PROCEDURE — 2580000003 HC RX 258: Performed by: HOSPITALIST

## 2022-05-20 PROCEDURE — 97166 OT EVAL MOD COMPLEX 45 MIN: CPT

## 2022-05-20 PROCEDURE — 84165 PROTEIN E-PHORESIS SERUM: CPT

## 2022-05-20 PROCEDURE — 83615 LACTATE (LD) (LDH) ENZYME: CPT

## 2022-05-20 PROCEDURE — 97535 SELF CARE MNGMENT TRAINING: CPT

## 2022-05-20 PROCEDURE — 85025 COMPLETE CBC W/AUTO DIFF WBC: CPT

## 2022-05-20 PROCEDURE — 99232 SBSQ HOSP IP/OBS MODERATE 35: CPT | Performed by: PHYSICAL MEDICINE & REHABILITATION

## 2022-05-20 PROCEDURE — 85652 RBC SED RATE AUTOMATED: CPT

## 2022-05-20 PROCEDURE — 99222 1ST HOSP IP/OBS MODERATE 55: CPT | Performed by: INTERNAL MEDICINE

## 2022-05-20 PROCEDURE — 97530 THERAPEUTIC ACTIVITIES: CPT

## 2022-05-20 PROCEDURE — 97130 THER IVNTJ EA ADDL 15 MIN: CPT

## 2022-05-20 PROCEDURE — 94150 VITAL CAPACITY TEST: CPT

## 2022-05-20 PROCEDURE — 83883 ASSAY NEPHELOMETRY NOT SPEC: CPT

## 2022-05-20 PROCEDURE — 94664 DEMO&/EVAL PT USE INHALER: CPT

## 2022-05-20 PROCEDURE — 94761 N-INVAS EAR/PLS OXIMETRY MLT: CPT

## 2022-05-20 PROCEDURE — 36415 COLL VENOUS BLD VENIPUNCTURE: CPT

## 2022-05-20 PROCEDURE — 97129 THER IVNTJ 1ST 15 MIN: CPT

## 2022-05-20 PROCEDURE — 97116 GAIT TRAINING THERAPY: CPT

## 2022-05-20 PROCEDURE — 86334 IMMUNOFIX E-PHORESIS SERUM: CPT

## 2022-05-20 PROCEDURE — 84155 ASSAY OF PROTEIN SERUM: CPT

## 2022-05-20 PROCEDURE — 86320 SERUM IMMUNOELECTROPHORESIS: CPT

## 2022-05-20 PROCEDURE — 1280000000 HC REHAB R&B

## 2022-05-20 RX ADMIN — LACTULOSE 20 G: 10 SOLUTION ORAL at 20:42

## 2022-05-20 RX ADMIN — LEVOTHYROXINE SODIUM 50 MCG: 0.05 TABLET ORAL at 05:33

## 2022-05-20 RX ADMIN — FUROSEMIDE 40 MG: 40 TABLET ORAL at 09:40

## 2022-05-20 RX ADMIN — MAGNESIUM OXIDE 400 MG (241.3 MG MAGNESIUM) TABLET 200 MG: TABLET at 20:42

## 2022-05-20 RX ADMIN — PANTOPRAZOLE SODIUM 40 MG: 40 TABLET, DELAYED RELEASE ORAL at 05:33

## 2022-05-20 RX ADMIN — HYDROCORTISONE ACETATE 25 MG: 25 SUPPOSITORY RECTAL at 20:48

## 2022-05-20 RX ADMIN — SODIUM CHLORIDE, PRESERVATIVE FREE 10 ML: 5 INJECTION INTRAVENOUS at 09:47

## 2022-05-20 RX ADMIN — DULOXETINE HYDROCHLORIDE 60 MG: 30 CAPSULE, DELAYED RELEASE ORAL at 20:42

## 2022-05-20 RX ADMIN — LACTULOSE 20 G: 10 SOLUTION ORAL at 05:33

## 2022-05-20 RX ADMIN — SODIUM CHLORIDE, PRESERVATIVE FREE 10 ML: 5 INJECTION INTRAVENOUS at 20:43

## 2022-05-20 RX ADMIN — LACTULOSE 20 G: 10 SOLUTION ORAL at 17:22

## 2022-05-20 RX ADMIN — RIFAXIMIN 550 MG: 550 TABLET ORAL at 20:43

## 2022-05-20 RX ADMIN — HYDROCORTISONE ACETATE 25 MG: 25 SUPPOSITORY RECTAL at 14:10

## 2022-05-20 RX ADMIN — SPIRONOLACTONE 100 MG: 50 TABLET ORAL at 09:42

## 2022-05-20 RX ADMIN — MAGNESIUM OXIDE 400 MG (241.3 MG MAGNESIUM) TABLET 200 MG: TABLET at 09:41

## 2022-05-20 RX ADMIN — NADOLOL 40 MG: 20 TABLET ORAL at 09:51

## 2022-05-20 RX ADMIN — RIFAXIMIN 550 MG: 550 TABLET ORAL at 09:43

## 2022-05-20 ASSESSMENT — PAIN SCALES - GENERAL: PAINLEVEL_OUTOF10: 0

## 2022-05-20 NOTE — PROGRESS NOTES
[facility] UNIT  SPEECH/LANGUAGE PATHOLOGY      [x] Daily           [] Discharge    Patient:Boby Contreras      LCQ:63[birth year]  YMB:[id number]  Rehab Dx/Hx: Hepatic failure, unspecified with coma [K72.91]  Hepatic encephalopathy ([address] [K72.90]   Allergies   Allergen Reactions    Lisinopril Swelling     Tongue swelling      Zetia [Ezetimibe] Swelling     Facial swelling    Atorvastatin     Codeine Other (See Comments)     Blood pressure drops    Hydrochlorothiazide     Hydroxyzine      Fatigue and depressed    Lexapro [Escitalopram Oxalate]      Increased depression    Pravastatin      Precautions: ; Restrictions/Precautions: Fall Risk,General Precautions          Home Situation/IADL:   Social/Functional History  Lives With: Spouse  Type of Home: House  Home Layout: One level  Home Access: Stairs to enter with rails  Entrance Stairs - Number of Steps: 4 CORAZON front of the home and 5 CORAZON at the back of the home. HRs c both sets of stairs. Entrance Stairs - Rails: Both  Bathroom Shower/Tub: Walk-in shower  Bathroom Toilet: Standard  Bathroom Equipment:  (Per Pt's spouse, Pt has no equipment in the home.)  Bathroom Accessibility: Accessible  Has the patient had two or more falls in the past year or any fall with injury in the past year?: Yes (Pt reports 2 falls in the last year--fall when pulled by dog.  Second fall on back porch due to LOB and stairs.)  ADL Assistance: Independent  Homemaking Assistance: Needs assistance  Homemaking Responsibilities: Yes  Meal Prep Responsibility: No (Wife completes.)  Laundry Responsibility: No (Wife completes.)  Cleaning Responsibility: No (Wife completes.)  Bill Paying/Finance Responsibility: No (Wife completes.)  Shopping Responsibility: No (Wife completes.)  Health Care Management: Primary (Pt manages medications c 7-day pillbox.)  Ambulation Assistance: Independent  Transfer Assistance: Independent  Active : Yes  Mode of Transportation: Car,Truck,Motorcycle  Education: 8th grade; some difficulty in school  Occupation: Retired  Type of Occupation:   Leisure & Hobbies: Likes to work on cars, cats and a [place]ian monika, wife does grocery shopping, out to eat. Pt manages medications c 7-day pillbox. Wife manages bills. Pt manages yardwork  IADL Comments: Likes to surf the internet  Additional Comments: Pt reports 2 falls in the last year--fall when pulled by dog. Second fall on back porch due to LOB and stairs. Date of Admit: 5/18/2022  Room #: 1006/1006-A     ST Number of Minutes/Billable Intervention  Cog/Memory Deficits 45    Aphasia/Language     Dysarthria/Speech     Apraxia/Speech     Dysphagia/Swallowing     Group     Other    TOTAL Minutes Billed  45    Variance          Date: 5/20/2022  Day of ARU Week:  3       SLP Individual Minutes  Time In: 8726  Time Out: 1130  Minutes: 45         Variance/Reason:  [] Refusal due to   [] Medical hold/reason  [] Illness   [] Off Unit for test/procedure  [] Extra time needed to complete task  [] Other (specify)    Activity completed: completed cog assess via BCAT--extended time needed due to hearing deficits and need for written info or direct facial cues. Pain: denies  Current Diet: ADULT DIET; Regular  ADULT ORAL NUTRITION SUPPLEMENT; Lunch, Dinner; Frozen Oral Supplement  Subjective: pt much more alert and responsive this date. Attn and focus improved and more aware of limitations      Goals and POC: Co-treats where appropriate with PT or OT to facilitate patient goals in functional tasks. LTG                           Short-term Goals  Timeframe for Short-term Goals: 3x/week x2 weeks 30 mins min  LTG: Pt will use strategies to improve comprehension of daily tasks for improved overall safety. Pt will improve cognition and memory for safe return home with spouse with minimal cues.   Goal 1: Complete cog assessment by 5/23/22  The Brief Cognitive Assessment Tool (BCAT®) was administered 5/20/2022 to assess the following cognitive domains: orientation, verbal recall, visual recognition, visual recall, attention, abstraction, language, executive functions, and visuo-spatial processing. The emphasis of the tool is assessing contextual memory, executive functions, and attentional capacity. Cognitive Impairment Scale:  NORMAL:    46-50   NO FUNCTIONAL DEFICIT; INDEPENDENT LIVING; MAY BE  SUBJECTIVE MEMORY COMPLAINTS BUT LITTLE TO NO EVIDENCE  MILD COGNITIVE  IMPAIRMENT; 34-46   GENERALLY FUNCTIONAL WITH EARLY SPECIFIC FX DECLINE (IADL)   LOWER SCORES MORE SUGGESTIVE OF ADDITIONAL SUPPORT NEEDS   BOTTOM RANGE SCORES CONSIDER MED MGMT AND SUPPORT FOR COMMUNITY REINTEGRATION    MILD DEMENTIA 26-34   IADL DEFICITS; CLEARLY OBJECTIVE EVIDENCE OF MEMORY AND OTHER COGNITIVE DECLINE; MED MGMT AND COMMUNITY REINTEGRATION SUPPORT NEEDED    MODERATE TO   SEVERE DEMENTIA 0-25   MODERATE (UPPER END OF RANGE)--PERVASIVE FX DEFICITS (IADLS) BUT ADLS GENERALLY INTACT   MARKED DEFICITS IN MEMORY AND EXECUTIVE FX; BEHAVIORAL AND PSYCH SYMPTOMS ARE COMMON      Dementia Impairment Scale:  Mild Cognitive Impairment  38  Mild Dementia  28  Moderate Dementia  18      BCAT score for Jordon Vyas: 24/40 indicating mild to moderate cognitive impairment. Unable to fully complete due to time restraints however enough info was obtained to further assist pt in his recovery. Strengths:  Ability to put names to objects  Ability to concentrate and focus--impacted by hearing deficits; improved with written cues  Determine how objects are similar to one another  Understand and express speech  Awareness of self, time, place, and situation  Weaknesses:  Understand visual processes and relationships  \"Command and control\" cognitive activities  Ability to immediately recall a word list of 4 items: banana/justice/Katie/bridge.   Pt able to complete immediate recall 2/4 and delayed recall 0/4  (3/4 with choice cues)  Strategies that improved performance included:   [x] repetition   [x] practical application    [] spaced retrieval    [x] choice cues    Improved attn and responses compared to yesterday. Pt had thoracentesis and reports feeling much better. Pt sets up his own med box at home and will be targeted for discharge planning needs. Pt stated, \"I've had trouble with my memory it seems my whole life. \"  When asked about his hearing he stated, \"I usually have to read lips and I don't do that very well anyhow. \"  Goal 2: Pt will improve multistep sequencing in fx improving from max to mod to min cues. Goal 3: Pt will improve recall of learned safety both verbally and fx with min cues  Goal 4: Pt will attend to tasks with good eye contact and request assist when not understanding with min to no cues for 15 mins  Attn and eye contact were improved only needing min cues when pt would get thinking and avert gaze and not hear questions from this therapist over 45 mins. Asked pt if he would benefit from use of a dry erase board for staff to write on and he said \"It couldn't hurt! \"  Goal 5: Pt will improve verbal and fx problem solving with min cues. The patient demonstrated a positive response to todays treatment and is making gradual progress toward established goals as evidenced by improved attn and alertness. Ongoing deficits are observed in the areas of cognition and memory and continued focus on carryover of learned tasks is recommended.      Alarm placed: []bed [x]chair   []other:   [x] activated  (Lantrymaggie Burgoskatia reported she was setting pt up due to need to move room)        Barriers to progress:   [] Fatigue        [x] Cognitive Deficits   [x] Memory Deficits   [] Reduced Attn   [] Self Limiting Behaviors    [] Reduced insight/awareness     [] Visual Deficits   [] Premorbid Conditions  [] Impulsivity     [] Other      Education/Interventions used this date: recommendations and strategies to be initiated    []   Progress was updated and reviewed in Rehab[facility]er with patient and/or family this         date. [] Attending Care Conference for pt this date. See Team Patient Care Conference Note for updates.     Interventions used this date:  [] Speech/Language Treatment       [] Dysphagia Treatment     [x] Cognitive Skill Development  [] Instruction in HEP     [] Group Therapy  Other:         Assessment / Impression                                                          Treatment/Activity Tolerance:   [x] Tolerated Treatment well:     [] Patient limited by fatigue/pain:       [] Patient limited by medical complications:    [] Adverse Reaction to Tx:   [] Significant change in status:      Electronically Signed by  Vince Sifuentes SLP, MA, CCC-SLP    5/20/2022  3:22 PM

## 2022-05-20 NOTE — CONSULTS
Comprehensive Nutrition Assessment    Type and Reason for Visit:  Consult    Nutrition Recommendations/Plan:   1. Continue current diet and supplement regimen as ordered  2. Please document all po intake  3. Will monitor po intake, weight trends, poc     Malnutrition Assessment:  Malnutrition Status:  Insufficient data (05/20/22 1212)    Context:  Acute Illness       Nutrition Assessment:    Pt admitted for ARU for hepatic failure, hepatic encephalopathy, PMH: alcoholic cirrhosis, HTN, HLD, Thyroid disease, pt currently on regular diet with frozen oral nutrition supplements ordered, pt consuming varied po intake per documentation (1-100%), will follow at moderate nutrition risk    Nutrition Related Findings:    POC glucose 113, 126, 110 Wound Type: None       Current Nutrition Intake & Therapies:    Average Meal Intake: %,51-75%,1-25%  Average Supplements Intake: Unable to assess  ADULT DIET; Regular  ADULT ORAL NUTRITION SUPPLEMENT; Lunch, Dinner; Frozen Oral Supplement    Anthropometric Measures:  Height: 5' 7.99\" (172.7 cm)  Ideal Body Weight (IBW): 154 lbs (70 kg)    Current Body Weight: 238 lb 12.1 oz (108.3 kg), 155 % IBW.     Current BMI (kg/m2): 36.3   Weight Adjustment For: No Adjustment  BMI Categories: Obese Class 2 (BMI 35.0 -39.9)    Estimated Daily Nutrient Needs:  Energy Requirements Based On: Formula  Weight Used for Energy Requirements: Current  Energy (kcal/day): 1039-3645 (Bertha Quarry w/ stress factor 1.0-1.2)  Weight Used for Protein Requirements: Ideal  Protein (g/day): 84-98  Method Used for Fluid Requirements: 1 ml/kcal    Nutrition Diagnosis:   · Inadequate oral intake related to acute injury/trauma as evidenced by  varied po intake per documentation    Nutrition Interventions:   Food and/or Nutrient Delivery: Continue Current Diet,Continue Oral Nutrition Supplement  Nutrition Education/Counseling: No recommendation at this time  Coordination of Nutrition Care: Continue to monitor while inpatient     Goals:     Goals: PO intake 75% or greater,prior to discharge     Nutrition Monitoring and Evaluation:   Behavioral-Environmental Outcomes: None Identified  Food/Nutrient Intake Outcomes: Supplement Intake,Food and Nutrient Intake  Physical Signs/Symptoms Outcomes: Biochemical Data,GI Status,Meal Time Behavior,Hemodynamic Status,Weight,Skin    Discharge Planning:     Too soon to determine     Ginger Mathis 89, [address],   Contact: [phone]

## 2022-05-20 NOTE — FLOWSHEET NOTE
SBA  Car Transfers: SBA  Assistive device required for transfer:  RW  Pt required vcs for proper hand placement. Pt required assist for catheter management. Gait:    Distance:  245'   Assistance:  SBA  Device:  RW  Gait Quality: reciprocal pattern     Stairs   # Completed: 12  Assistance: SBA  Supportive Device:  2 rails  Assisted pt with placement of catheter on his own person. Patient/Caregiver Education and Training:   [x]   Bed Mobility/Transfer technique/safety  [x]   Gait technique/sequencing  [x]   Proper use of assistive device  []   Advanced mobility safety and technique  []   Reinforced patient's precautions/mobility while maintaining precautions  []   Postural awareness  []   Family training    Treatment Plan for Next Session: gait; transfers; advanced gait; stair training; exercises; balance training      Assessment: This pt demonstrated a positive response to today's treatment as evidenced by improved mobility. The patient is making progress toward established goals as evidenced by QI scores. Ongoing deficits are observed in the areas of strength, balance, endurance, and safety and continued focus on this is recommended. Treatment/Activity Tolerance:   [x] Tolerated treatment with no adverse effects    [] Patient limited by fatigue  [] Patient limited by pain   [] Patient limited by medical complications:    [] Adverse reaction to Tx:   [] Significant change in status    Safety:       [x]  bed alarm set    []  chair alarm set    []  Pt refused alarms                [x]  Telesitter activated      [x]  Gait belt used during tx session      [x]other: pt left in semi-griffiths's position in bed with call light at end of treatment.           Number of Minutes/Billable Intervention  Gait Training 30   Therapeutic Exercise    Neuro Re-Ed    Therapeutic Activity 15   Wheelchair Propulsion    Group    Other:    TOTAL 45         Social History  Social/Functional History  Lives With: Spouse  Type of Home: House  Home Layout: One level  Home Access: Stairs to enter with rails  Entrance Stairs - Number of Steps: 4 CORAZON front of the home and 5 CORAZON at the back of the home. HRs c both sets of stairs. Entrance Stairs - Rails: Both  Bathroom Shower/Tub: Walk-in shower  Bathroom Toilet: Standard  Bathroom Equipment:  (Per Pt's spouse, Pt has no equipment in the home.)  Bathroom Accessibility: Accessible  Has the patient had two or more falls in the past year or any fall with injury in the past year?: Yes (Pt reports 2 falls in the last year--fall when pulled by dog. Second fall on back porch due to LOB and stairs.)  ADL Assistance: Independent  Homemaking Assistance: Needs assistance  Homemaking Responsibilities: Yes  Meal Prep Responsibility: No (Wife completes.)  Laundry Responsibility: No (Wife completes.)  Cleaning Responsibility: No (Wife completes.)  Bill Paying/Finance Responsibility: No (Wife completes.)  Shopping Responsibility: No (Wife completes.)  Health Care Management: Primary (Pt manages medications c 7-day pillbox.)  Ambulation Assistance: Independent  Transfer Assistance: Independent  Active : Yes  Mode of Transportation: Car,Truck,Motorcycle  Education: 8th grade; some difficulty in school  Occupation: Retired  Type of Occupation:   Leisure & Hobbies: Likes to work on cars, cats and a Mexican monika, wife does grocery shopping, out to eat. Pt manages medications c 7-day pillbox. Wife manages bills. Pt manages yardwork  IADL Comments: Likes to surf the internet  Additional Comments: Pt reports 2 falls in the last year--fall when pulled by dog. Second fall on back porch due to LOB and stairs. Objective                                                                                    Goals:  (Update in navigator)   :   Long Term Goals  Time Frame for Long term goals : 7-10 tx days:  Long term goal 1: Pt will complete bed mobility (scooting, rolling R/L, and sup<->sit) Ind.  Long term goal 2: Pt will complete OOB transfers Mod Ind-Ind. Long term goal 3: Pt will ambulate >/=150 ft over level surface and at least 10 ft of uneven surface using 2WW Mod Ind. Long term goal 4: Pt will ascend/descend curb step using 2WW and 1 flight of stairs using railings Mod Ind. Long term goal 5: Pt will complete object retrieval from the floor with 2WW and reacher Mod Ind.:        Plan of Care                                                                              Times per week: 5 days per week for a minimum of 60 minutes/day plus group as appropriate for 60 minutes.   Treatment to include Current Treatment Recommendations: Strengthening,Balance training,Functional mobility training,Transfer training,Cognitive/Perceptual training,Endurance training,Gait training,Stair training,Cognitive reorientation,Home exercise program,Safety education & training,Patient/Caregiver education & training,Equipment evaluation, education, & procurement,Therapeutic activities    Electronically signed by   Yuri Briones, [id number]  5/20/2022, 3:05 PM

## 2022-05-20 NOTE — PROGRESS NOTES
Reny Thakur    : 1945  Acct #: [de-identified]  MRN: [id number]              PM&R Progress Note      Admitting diagnosis: Hepatic encephalopathy ( [address])     Comorbid diagnoses impacting rehabilitation: Generalized weakness, gait disturbance, dysphagia, acute respiratory failure with hypoxia, pleural effusions, alcoholic cirrhosis, acute on chronic kidney disease, hypothyroidism, obesity (BMI 37.3)    Chief complaint: Fullness in his abdomen. Prior (baseline) level of function: Independent. Current level of function:         Current  IRF-VAMSI and Goals:   Occupational Therapy:    Short Term Goals  Time Frame for Short term goals: STGs=LTGs :   Long Term Goals  Time Frame for Long term goals : 7-10 days or until d/c. Long Term Goal 1: Pt will self-feed c Ind. Long Term Goal 2: Pt will complete grooming tasks c Mod I.  Long Term Goal 3: Pt will complete total body bathing c AE PRN and Mod I.  Long Term Goal 4: Pt will complete UB dressing c Ind. Long Term Goal 5: Pt will complete LB dressing c AE PRN and Mod I. Additional Goals?: Yes  Long Term Goal 6: Pt will doff/don footwear c AE PRN and Mod I.  Long Term Goal 7: Pt will complete toileting c Mod I.  Long Term Goal 8: Pt will perform functional transfers (bed, chair, toilet, shower) c DME PRN and Mod I.  Long Term Goal 9: Pt will perform therex/therax to facilitate an increase in strength/endurance/ax tolerance (c emphasis on dynamic standing balance/tolerance > 8 mins) c Mod I.  Long Term Goal 10: Pt will complete oral hygiene c Mod I. :                                       Eating: Eating  Assistance Needed: Setup or clean-up assistance  Comment: Pt reports he has had some difficulty opening packages/containers. CARE Score: 5  Discharge Goal: Independent       Oral Hygiene: Oral Hygiene  Assistance Needed: Supervision or touching assistance  Comment: Pt required verbal cues to terminate task.   CARE Score: 4  Discharge Goal: Independent    UB/LB Bathing: Shower/Bathe Self  Assistance Needed: Supervision or touching assistance  Comment: Pt required Max verbal cues for sequencing UB/LB bathing. Pt able to complete sink side washing up c CG/SBA. Pt seated for majority. CARE Score: 4  Discharge Goal: Independent    UB Dressing: Upper Body Dressing  Assistance Needed: Supervision or touching assistance  Comment: Pt required cues to manage O2 line when donning/doffing pullover tshirt. CARE Score: 4  Discharge Goal: Independent         LB Dressing: Lower Body Dressing  Assistance Needed: Partial/moderate assistance  Comment: Pt required Min A d/t needing assist c silva cath into depends and pants. CARE Score: 3  Discharge Goal: Independent    Donning and Trion Footwear: Putting On/Taking Off Footwear  Assistance Needed: Supervision or touching assistance  Comment: Supervision to doff/don socks using figure four method. CARE Score: 4  Discharge Goal: Independent      Toiletin [address] needed: Supervision or touching assistance  Comment: CGA to manage depends. CARE Score: 4  Discharge Goal: Independent      Toilet Transfers: Toilet Transfer  Assistance needed: Supervision or touching assistance  Comment: CGA c 2WW and grab bars. Pt also required verbal cues for O2 line management. CARE Score: 4  Discharge Goal: Independent    Physical Therapy:         Long Term Goals  Time Frame for Long term goals : 7-10 tx days:  Long term goal 1: Pt will complete bed mobility (scooting, rolling R/L, and sup<->sit) Ind.  Long term goal 2: Pt will complete OOB transfers Mod Ind-Ind. Long term goal 3: Pt will ambulate >/=150 ft over level surface and at least 10 ft of uneven surface using 2WW Mod Ind. Long term goal 4: Pt will ascend/descend curb step using 2WW and 1 flight of stairs using railings Mod Ind. Long term goal 5: Pt will complete object retrieval from the floor with 2WW and reacher Mod Ind.       Bed Mobility:   Sit to Lying  Assistance Needed: Supervision or touching assistance  Comment: SBA-Sup without use of bed features with additional assist on silva catheter management  CARE Score: 4  Discharge Goal: Independent  Roll Left and Right  Assistance Needed: Supervision or touching assistance  Comment: SBA-Sup without use of bed features; required additional assist on silva catherer management prior to rolling to R as silva catheter pre-positioned on LLE  CARE Score: 4  Discharge Goal: Independent  Lying to Sitting on Side of Bed  Assistance Needed: Supervision or touching assistance  Comment: SBA without use of bed features towards L side of bed where silva catheter is pre-positioned; reports mild lightneadedness upon sitting up but no trunk swaying observed  CARE Score: 4  Discharge Goal: Independent    Transfers:    Sit to Stand  Assistance Needed: Supervision or touching assistance  Comment: SBA with 2WW with additional assist on silva catheter management; pt consistent with pushing with both hands today to initiate transfer versus pulling onto AD on PT eval  CARE Score: 4  Discharge Goal: Independent  Chair/Bed-to-Chair Transfer  Assistance Needed: Supervision or touching assistance  Comment: SBA-Sup with 2WW with additional assist on O2 line management  CARE Score: 4  Discharge Goal: Independent  Toilet Transfer  Assistance needed: Supervision or touching assistance  Comment: CGA with 2WW and grab bars with additional assist on O2 line management  CARE Score: 4  Car Transfer  Assistance Needed: Supervision or touching assistance  Comment: CGA with 2WW with additional assist on silva catheter and O2 line management  CARE Score: 4  Discharge Goal: Independent    Ambulation:    Walking Ability  Does the Patient Walk?: Yes     Walk 10 Feet  Assistance Needed: Supervision or touching assistance  Comment: CGA using 2WW with additional assist on O2 line management  CARE Score: 4  Discharge Goal: Independent     Walk 50 Feet with Two Turns  Assistance Needed: Supervision or touching assistance  Comment: CGA using 2WW with additional assist on O2 line management  CARE Score: 4  Discharge Goal: Independent     Walk 150 Feet  Assistance Needed: Supervision or touching assistance  Comment: CGA using 2WW with additional assist on O2 line management; pt was able to ambulate 384 ft with turns consecutively today with SpO2 at 2L at % after completing this distance  CARE Score: 4  Discharge Goal: Independent     Walking 10 Feet on Uneven Surfaces  Assistance Needed: Supervision or touching assistance  Comment: CGA using 2WW with additional assist on O2 line management  CARE Score: 4  Discharge Goal: Independent     1 Step (Curb)  Assistance Needed: Supervision or touching assistance  Comment: CGA using 2WW with additional assist on O2 line management  CARE Score: 4  Discharge Goal: Independent     4 Steps  Assistance Needed: Supervision or touching assistance  Comment: CGA using railings (usually with step-through pattern) on ascent/descent of 4\"/6\" step heights; required additional assist on O2 line and silva catheter management  CARE Score: 4  Discharge Goal: Independent     12 Steps  Assistance Needed: Supervision or touching assistance  Comment: CGA using railings (usually with step-through pattern) on ascent/descent of 4\"/6\" step heights; required additional assist on O2 line and silva catheter management  CARE Score: 4  Discharge Goal: Independent       Wheelchair:  w/c Ability: Wheelchair Ability  Uses a Wheelchair and/or Scooter?: No                Balance:        Object: Picking Up Object  Assistance Needed: Supervision or touching assistance  Comment: CGA with 2WW and reacher  CARE Score: 4  Discharge Goal: Independent    I      Exam:    Blood pressure (!) 115/50, pulse 61, temperature 98.1 °F (36.7 °C), resp. rate 16, height 5' 7.99\" (1.727 m), weight 233 lb 14.5 oz (106.1 kg), SpO2 95 %. General: Lying down in bed. Quiet. Uncomfortable. HEENT: Neck supple. MMM. No JVD. Pulmonary: Unlabored breathing but shallow respirations with abdominal girth issues. Cardiac: His rate is controlled. Premature beats noted. Abdomen: Patient's abdomen is soft and nondistended. Bowel sounds were present throughout. There was no rebound, guarding or masses noted. Upper extremities: No new bruising or swelling. Lower extremities: No signs of DVT. Trace edema. Sitting balance was fair. Standing balance was poor. Lab Results   Component Value Date    WBC 11.2 (H) 05/18/2022    HGB 12.5 (L) 05/18/2022    HCT 38.9 (L) 05/18/2022    .8 (H) 05/18/2022     05/18/2022     Lab Results   Component Value Date    INR 1.12 05/18/2022    INR 1.08 05/17/2022    INR 1.12 05/15/2022    PROTIME 14.4 05/18/2022    PROTIME 13.9 05/17/2022    PROTIME 14.4 05/15/2022     Lab Results   Component Value Date    CREATININE 1.3 05/18/2022    BUN 32 (H) 05/18/2022     05/18/2022    K 4.7 05/18/2022     05/18/2022    CO2 28 05/18/2022     Lab Results   Component Value Date    ALT 46 (H) 05/18/2022    AST 53 (H) 05/18/2022    ALKPHOS 150 (H) 05/18/2022    BILITOT 0.6 05/18/2022       Expected length of stay  prior to a supervised level of function for discharge home with a walker and [facility] OT/PT is 2-1/2 weeks. Recommendations:    1. Hepatic encephalopathy with gait disturbance:   He is not engaging well in therapy because of shortness of breath, his procedures and pain in his abdomen. He needs maximum encouragement to try his daily occupational and physical therapy with speech-language pathology.     Will consult GI to manage the medical care of his liver disease so that he can be as alert as possible to retain new information from his training. Sherine Smoker needs adaptive equipment training, caregiver education and DVT prophylaxis.    Offering him a graduated schedule of activities to build tolerance.  Nutritional support. Prieto Demarco management by GI and the interventionalists. Verbal cues and maximum physical assistance for transfers again today. 2. DVT prophylaxis: Lovenox 40 mg subcu daily to resume after his procedure.  Weightbearing activities are expected to be limited but will be tried daily.  I must periodically monitor his hemoglobin and platelet count while on this medication.  GI prophylaxis is available. No new bruising or swelling. 3. Acute respiratory failure with hypoxia: Thoracentesis completed with little benefit from shortness of breath. He still feels quite tired. Management of the liver disease that is causing the effusion buildup.  Bronchodilators periodically as needed.  Monitoring his oxygen levels at rest and with activity. 4. Acute on chronic kidney disease: Avoiding wide swings of blood pressure.  Avoiding nephrotoxic medications when possible.  Nephrology to monitor his recovery.  Periodic monitoring of his chemistries. 5. Alcoholic cirrhosis: Xifaxan and Chronulac.  Abstinence from alcohol.  Avoiding acetaminophen.  Diuretics for the ascites.  GI to monitor his need for further paracentesis.

## 2022-05-20 NOTE — PROGRESS NOTES
recommendations  Anticipated discharge date:  TBD   Destination: []home alone   []home alone with assist PRN     [] home w/ family      [] Continuous supervision  []SNF    [] Assisted living     [] Other:  Continued therapy: [x]HHC PT  []OUTPATIENT PT   [] No Further PT  []SNF PT  Caregiver training recommended: []Yes  [] No   Equipment needs: likely 2WW      PT IRF-VAMSI scores and goals for discharge assessment:     Sit to Stand  Assistance Needed: Supervision or touching assistance  Comment: SBA with 2WW with additional assist on silva catheter management; pt consistent with pushing with both hands today to initiate transfer versus pulling onto AD on PT eval  CARE Score: 4  Discharge Goal: Independent    Chair/Bed-to-Chair Transfer  Assistance Needed: Supervision or touching assistance  Comment: SBA-Sup with 2WW with additional assist on O2 line management  CARE Score: 4  Discharge Goal: Independent      Walk 10 Feet?   Walk 10 Feet?: Yes      Walking Ability  Does the Patient Walk?: Yes    Walk 10 Feet  Assistance Needed: Supervision or touching assistance  Comment: CGA using 2WW with additional assist on O2 line management  CARE Score: 4  Discharge Goal: Independent    Walk 50 Feet with Two Turns  Assistance Needed: Supervision or touching assistance  Comment: CGA using 2WW with additional assist on O2 line management  CARE Score: 4  Discharge Goal: Independent    Walk 150 Feet  Assistance Needed: Supervision or touching assistance  Comment: CGA using 2WW with additional assist on O2 line management; pt was able to ambulate 384 ft with turns consecutively today with SpO2 at 2L at % after completing this distance  CARE Score: 4  Discharge Goal: Independent    Walking 10 Feet on Uneven Surfaces  Assistance Needed: Supervision or touching assistance  Comment: CGA using 2WW with additional assist on O2 line management  CARE Score: 4  Discharge Goal: Independent    1 Step (Curb)  Assistance Needed: Supervision or touching assistance  Comment: CGA using 2WW with additional assist on O2 line management  CARE Score: 4  Discharge Goal: Independent    Patient/Caregiver Education and Training:   []   Role of PT  []   Education about Dx  []   Use of call light for assist   []   HEP provided and explained   [x]   Treatment plan reviewed  []   Home safety  []   Wheelchair mobility/management   []   Body mechanics  []   Bed Mobility/Transfer technique  []   Gait technique/sequencing  []   Proper use of assistive device/adaptive equipment  []   Stair training/Advanced mobility safety and technique  []   Reinforced patient's precautions/mobility while maintaining precautions  []   Postural awareness  []   Family/caregiver training  []   Progress was updated and reviewed in Rehabtracker with patient and/or family this date. [x]   Other: awareness of line attachments and personal chair alarm for safety      Treatment Plan for Next Session: progress level surface gait training, car transfer, object retrieval, and  stairs to SBA    Assessment: This pt demonstrated a positive response to today's treatment as evidenced by improved sit<->stand transfers and increased ambulation tolerance over level surface without desaturation and significant pulmonary Sx. The patient is making good progress toward established goals as evidenced by QI scores.      Treatment/Activity Tolerance:   [x] Tolerated treatment with no adverse effects    [] Patient limited by fatigue  [] Patient limited by pain   [] Patient limited by medical complications:    [] Adverse reaction to Tx:   [] Significant change in status    Safety:       []  bed alarm set    []  chair alarm set    []  Pt refused alarms                []  Telesitter activated      [x]  Gait belt used during tx session      [x]other: pt left in w/c under SLP's care       Number of Minutes/Billable Intervention  Gait Training 40   Therapeutic Exercise    Neuro Re-Ed    Therapeutic Activity 20   Wheelchair Propulsion    Group    Other:    TOTAL 60     Social History  Social/Functional History  Lives With: Spouse  Type of Home: House  Home Layout: One level  Home Access: Stairs to enter with rails  Entrance Stairs - Number of Steps: 4 CORAZON front of the home and 5 CORAZON at the back of the home. HRs c both sets of stairs. Entrance Stairs - Rails: Both  Bathroom Shower/Tub: Walk-in shower  Bathroom Toilet: Standard  Bathroom Equipment:  (Per Pt's spouse, Pt has no equipment in the home.)  Bathroom Accessibility: Accessible  Has the patient had two or more falls in the past year or any fall with injury in the past year?: Yes (Pt reports 2 falls in the last year--fall when pulled by dog. Second fall on back porch due to LOB and stairs.)  ADL Assistance: Independent  Homemaking Assistance: Needs assistance  Homemaking Responsibilities: Yes  Meal Prep Responsibility: No (Wife completes.)  Laundry Responsibility: No (Wife completes.)  Cleaning Responsibility: No (Wife completes.)  Bill Paying/Finance Responsibility: No (Wife completes.)  Shopping Responsibility: No (Wife completes.)  Health Care Management: Primary (Pt manages medications c 7-day pillbox.)  Ambulation Assistance: Independent  Transfer Assistance: Independent  Active : Yes  Mode of Transportation: Car,Truck,Motorcycle  Education: 8th grade; some difficulty in school  Occupation: Retired  Type of Occupation:   Leisure & Hobbies: Likes to work on cars, cats and a [place] monika, wife does grocery shopping, out to eat. Pt manages medications c 7-day pillbox. Wife manages bills. Pt manages yardwork  IADL Comments: Likes to surf the internet  Additional Comments: Pt reports 2 falls in the last year--fall when pulled by dog. Second fall on back porch due to LOB and stairs. Objective                                                                                    Goals:  (Update in navigator)   :   Long Term Goals  Time Frame for Long term goals : 7-10 tx days:  Long term goal 1: Pt will complete bed mobility (scooting, rolling R/L, and sup<->sit) Ind.  Long term goal 2: Pt will complete OOB transfers Mod Ind-Ind. Long term goal 3: Pt will ambulate >/=150 ft over level surface and at least 10 ft of uneven surface using 2WW Mod Ind. Long term goal 4: Pt will ascend/descend curb step using 2WW and 1 flight of stairs using railings Mod Ind. Long term goal 5: Pt will complete object retrieval from the floor with 2WW and reacher Mod Ind.:        Plan of Care                                                                              Times per week: 5 days per week for a minimum of 60 minutes/day plus group as appropriate for 60 minutes.   Treatment to include Current Treatment Recommendations: Strengthening,Balance training,Functional mobility training,Transfer training,Cognitive/Perceptual training,Endurance training,Gait training,Stair training,Cognitive reorientation,Home exercise program,Safety education & training,Patient/Caregiver education & training,Equipment evaluation, education, & procurement,Therapeutic activities    Electronically signed by   Maritza Macedo PT  5/20/2022, 10:53 AM

## 2022-05-20 NOTE — PROGRESS NOTES
Occupational Therapy                              [facility] ARU OCCUPATIONAL THERAPY EVALUATION    Chart Review:  Past Medical History:   Diagnosis Date    Anxiety     [facility]     Diabetes mellitus [facility]     GERD (gastroesophageal reflux disease) 2000    GERD (gastroesophageal reflux disease)     Hyperlipidemia     Hypertension     Portal hypertension (HCC)     Pulmonary nodules     Sleep apnea     SOB (shortness of breath)     Thyroid disease      Past Surgical History:   Procedure Laterality Date    APPENDECTOMY      CHOLECYSTECTOMY      COLONOSCOPY      ENDOSCOPY, COLON, DIAGNOSTIC      FOOT SURGERY      needle removed,not sure which foot, per wife.  TONSILLECTOMY      UPPER GASTROINTESTINAL ENDOSCOPY N/A 1/19/2022    EGD BIOPSY performed by Jose Sanchez MD at [place] [address] History:  Social/Functional History  Lives With: Spouse  Type of Home: House  Home Layout: One level  Home Access: Stairs to enter with rails  Entrance Stairs - Number of Steps: 4 CORAZON front of the home and 5 CORAZON at the back of the home. HRs c both sets of stairs. Entrance Stairs - Rails: Both  Bathroom Shower/Tub: Walk-in shower  Bathroom Toilet: Standard  Bathroom Equipment:  (Per Pt's spouse, Pt has no equipment in the home.)  Bathroom Accessibility: Accessible  Has the patient had two or more falls in the past year or any fall with injury in the past year?: Yes (Pt reports 2 falls in the last year--fall when pulled by dog.  Second fall on back porch due to LOB and stairs.)  ADL Assistance: Independent  Homemaking Assistance: Needs assistance  Homemaking Responsibilities: Yes  Meal Prep Responsibility: No (Wife completes.)  Laundry Responsibility: No (Wife completes.)  Cleaning Responsibility: No (Wife completes.)  Bill Paying/Finance Responsibility: No (Wife completes.)  Shopping Responsibility: No (Wife completes.)  Health Care Management: Primary (Pt manages medications c 7-day pillbox.)  Ambulation Assistance: Independent  Transfer Assistance: Independent  Active : Yes  Mode of Transportation: Car,Truck,Motorcycle  Education: 8th grade; some difficulty in school  Occupation: Retired  Type of Occupation:   Leisure & Hobbies: Likes to work on cars, cats and a [place] monika, wife does grocery shopping, out to eat. Pt manages medications c 7-day pillbox. Wife manages bills. Pt manages yardwork  IADL Comments: Likes to surf the internet  Additional Comments: Pt reports 2 falls in the last year--fall when pulled by dog. Second fall on back porch due to LOB and stairs. Restrictions:  Restrictions/Precautions  Restrictions/Precautions: Fall Risk,General Precautions        Position Activity Restriction  Other position/activity restrictions: PINEDA, PICC line RUE, silva catheter, 2L O2         Pain Level: 0       Objective:  Observation/Palpation  Observation: Pt being adjusted in bed by nursing upon entrance into room. Pt pleasant and agreeable to therapy. Pt c confusion needing redirection often. Pt has tele sitter in room and IV line to RUE. Vision  Vision: Within Functional Limits  Vision Exceptions: Wears glasses for reading     Hearing  Hearing: Exceptions to [facility]  Hearing Exceptions: Hard of hearing/hearing concerns    ROM:      LUE AROM (degrees)  LUE AROM : WNL     Left Hand AROM (degrees)  Left Hand AROM: WNL     RUE AROM (degrees)  RUE AROM : WNL     Right Hand AROM (degrees)  Right Hand AROM: WNL    Strength:    LUE Strength  Gross LUE Strength: WNL  L Hand General: 5/5  LUE Strength Comment: 5/5 grossly  RUE Strength  Gross RUE Strength: WNL  R Hand General: 5/5  RUE Strength Comment: 5/5 grossly    Quality of Movement:   Tone RUE  RUE Tone: Normotonic  Tone LUE  LUE Tone: Normotonic  Coordination  Movements Are Fluid And Coordinated: Yes  Coordination and Movement Description: Fine motor impairments       Sensation:    Sensation  Overall Sensation Status: WFL     ADLs:  Eating: Eating  Assistance Needed: Setup or clean-up assistance  Comment: Pt reports he has had some difficulty opening packages/containers. CARE Score: 5  Discharge Goal: Independent       Oral Hygiene: Oral Hygiene  Assistance Needed: Supervision or touching assistance  Comment: Pt required verbal cues to terminate task. CARE Score: 4  Discharge Goal: Independent    UB/LB Bathing: Shower/Bathe Self  Assistance Needed: Supervision or touching assistance  Comment: Pt required Max verbal cues for sequencing UB/LB bathing. Pt able to complete sink side washing up c CG/SBA. Pt seated for majority. CARE Score: 4  Discharge Goal: Independent    UB Dressing: Upper Body Dressing  Assistance Needed: Supervision or touching assistance  Comment: Pt required cues to manage O2 line when donning/doffing pullover tshirt. CARE Score: 4  Discharge Goal: Independent         LB Dressing:   Lower Body Dressing  Assistance Needed: Partial/moderate assistance  Comment: Pt required Min A d/t needing assist c silva cath into depends and pants. CARE Score: 3  Discharge Goal: Independent    Donning and [place] Footwear: Putting On/Taking Off Footwear  Assistance Needed: Supervision or touching assistance  Comment: Supervision to doff/don socks using figure four method. CARE Score: 4  Discharge Goal: Independent      Toiletin [address] needed: Supervision or touching assistance  Comment: CGA to manage depends. CARE Score: 4  Discharge Goal: Independent      Bed Mobility:    Bed mobility  Supine to Sit: Supervision    Transfers:    Transfers  Stand Pivot Transfers: Contact guard assistance  Sit to stand: Contact guard assistance  Stand to sit: Contact guard assistance           Toilet Transfer  Assistance needed: Supervision or touching assistance  Comment: CGA c 2WW and grab bars. Pt also required verbal cues for O2 line management.   CARE Score: 4  Discharge Goal: Independent    Functional Mobility:    Balance  Sitting Balance: Supervision  Standing Balance: Contact guard assistance  Standing Balance  Time: ~ 3 mins  Activity: ADL  Functional Mobility  Functional - Mobility Device: Rolling Walker  Activity: To/from bathroom  Assist Level: Contact guard assistance  Functional Mobility Comments: Pt needing verbal cues for O2 line management. Apparatus Needs  Apparatus Needs: O2    Cognition:  Cognition  Overall Cognitive Status: Exceptions  Arousal/Alertness: Delayed responses to stimuli  Following Commands: Follows one step commands with increased time,Follows one step commands with repetition  Attention Span: Difficulty dividing attention  Memory: Decreased recall of recent events,Decreased short term memory  Safety Judgement: Decreased awareness of need for safety,Decreased awareness of need for assistance  Problem Solving: Assistance required to identify errors made,Assistance required to correct errors made  Insights: Decreased awareness of deficits  Initiation: Requires cues for some  Sequencing: Requires cues for some  Cognition Comment: Pt is confused, needs occasional verbal cues for processing/sequencing, decreased safety awareness    Perception:  Perception  Overall Perceptual Status: WFL      Assessment:     Performance deficits / Impairments: Decreased functional mobility ,Decreased strength,Decreased endurance,Decreased coordination,Decreased ADL status,Decreased safe awareness,Decreased high-level IADLs,Decreased cognition,Decreased balance,Decreased fine motor control    The patient is a 78year old male admitted onto ARU after hospitalization for ED visit on 5/2/2022 with several days of progressive shortness of breath. He reported there was some relief with side-lying when trying to breathe but overall he was deteriorating rapidly. Imaging identified significant pleural effusion and ascites. On 5/5/2022 he had 6.9 L of ascites removed through paracentesis.   That day he also had 2 L of fluid removed from his pleural cavity with thoracentesis. A week later on 5/12/2022 he had 1.4 L of fluid pulled off his chest once again. He has had persistent fatigue, generalized weakness and dyspnea. He has developed impaired swallowing, cognition and generalized weakness. He has been diagnosed with a hepatic encephalopathy. Today, Pt pleasantly confused and needing occasional redirection and cues for sequencing. Pt able to complete all ADLs including CGA for toileting and bathing and Min A for LB dressing d/t needing assist managing silva cath. Pt completed functional mobility c use of 2WW and CGA c verbal cues for O2 management. The QI, MMT, and ROM standardized assessments were used this date to determine the above performance deficits, which compromise pt's ability to safely complete ADLs/IADLs/mobility. Pt will benefit from ARU OT services to increase functional performance and return to PLOF. Decision Making: Medium Complexity  Clinical Presentation:  Pt presents to this ARU c deficits including functional balance, endurance, cognition (attention, processing, sequencing, terminating), and ADLs. Patient education:   ARU karrie, Role of O.T., O.T. plan of care  []   Patient goal was established and reviewed in Rehabtracker with patient and/or family this date. REQUIRES OT FOLLOW-UP: Yes  Discharge Recommendations:  Home c spouse and [facility] OT. Equipment Recommendations:  Shower chair. Goals:     Short Term Goals  Time Frame for Short term goals: STGs=LTGs  Long Term Goals  Time Frame for Long term goals : 7-10 days or until d/c. Long Term Goal 1: Pt will self-feed c Ind. Long Term Goal 2: Pt will complete grooming tasks c Mod I.  Long Term Goal 3: Pt will complete total body bathing c AE PRN and Mod I.  Long Term Goal 4: Pt will complete UB dressing c Ind. Long Term Goal 5: Pt will complete LB dressing c AE PRN and Mod I.   Additional Goals?: Yes  Long Term Goal 6: Pt will doff/don footwear c AE PRN and Mod I.  Long Term Goal 7: Pt will complete toileting c Mod I.  Long Term Goal 8: Pt will perform functional transfers (bed, chair, toilet, shower) c DME PRN and Mod I.  Long Term Goal 9: Pt will perform therex/therax to facilitate an increase in strength/endurance/ax tolerance (c emphasis on dynamic standing balance/tolerance > 8 mins) c Mod I.  Long Term Goal 10:  Pt will complete oral hygiene c Mod I.    Plan:    Pt will be seen at least 60 minutes per day for a minimum of 5 days per week, plus group therapy as appropriate  Current Treatment Recommendations: Strengthening,Balance training,Functional mobility training,Endurance training,Pain management,Safety education & training,Patient/Caregiver education & training,Self-Care / ADL,Home management training,Neuromuscular re-education,Cognitive reorientation,Equipment evaluation, education, & procurement,Coordination training,Cognitive/Perceptual training    OT Individual Minutes  Time In: 0730  Time Out: 7386  Minutes: 94                Number of Minutes/Billable Intervention      OT Evaluation 20   Therapeutic Exercise    ADL Self-care 74   Neuro Re-Ed    Therapeutic Activity    Group    Other:    TOTAL 94     Electronically signed by:    TROY Harding, OTR/L    5/20/2022, 9:23 AM

## 2022-05-20 NOTE — PROGRESS NOTES
Sharla Shetty    : 1945  Acct #: [de-identified]  MRN: [id number]              PM&R Progress Note      Admitting diagnosis: Hepatic encephalopathy ( [address])     Comorbid diagnoses impacting rehabilitation: Generalized weakness, gait disturbance, dysphagia, acute respiratory failure with hypoxia, pleural effusions, alcoholic cirrhosis, acute on chronic kidney disease, hypothyroidism, obesity (BMI 37.3)     Chief complaint: Patient is very tired. Some complaints of abdominal fullness. Prior (baseline) level of function: Independent. Current level of function:         Current  IRF-VAMSI and Goals:   Occupational Therapy:      :     :                                       Eating:         Oral Hygiene:      UB/LB Bathing:      UB Dressing:           LB Dressing:      Donning and Sleeping [place] Footwear: Toileting: Toilet Transfers: Toilet Transfer  Assistance needed: Supervision or touching assistance  Comment: CGA with 2WW and grab bars with additional assist on O2 line management  CARE Score: 4    Physical Therapy:         Long Term Goals  Time Frame for Long term goals : 7-10 tx days:  Long term goal 1: Pt will complete bed mobility (scooting, rolling R/L, and sup<->sit) Ind.  Long term goal 2: Pt will complete OOB transfers Mod Ind-Ind. Long term goal 3: Pt will ambulate >/=150 ft over level surface and at least 10 ft of uneven surface using 2WW Mod Ind. Long term goal 4: Pt will ascend/descend curb step using 2WW and 1 flight of stairs using railings Mod Ind. Long term goal 5: Pt will complete object retrieval from the floor with 2WW and reacher Mod Ind.       Bed Mobility:   Sit to Lying  Assistance Needed: Supervision or touching assistance  Comment: SBA-Sup without use of bed features with additional assist on silva catheter management  CARE Score: 4  Discharge Goal: Independent  Roll Left and Right  Assistance Needed: Supervision or touching assistance  Comment: SBA-Sup without use of bed features; required additional assist on silva catherer management prior to rolling to R as silva catheter pre-positioned on LLE  CARE Score: 4  Discharge Goal: Independent  Lying to Sitting on Side of Bed  Assistance Needed: Supervision or touching assistance  Comment: SBA without use of bed features towards L side of bed where silva catheter is pre-positioned; reports mild lightneadedness upon sitting up but no trunk swaying observed  CARE Score: 4  Discharge Goal: Independent    Transfers:    Sit to Stand  Assistance Needed: Supervision or touching assistance  Comment: CGA with 2WW with additional assist on silva catheter and O2 line management  CARE Score: 4  Discharge Goal: Independent  Chair/Bed-to-Chair Transfer  Assistance Needed: Supervision or touching assistance  Comment: CGA with 2WW with additional assist on silva catheter and O2 line management  CARE Score: 4  Discharge Goal: Independent  Toilet Transfer  Assistance needed: Supervision or touching assistance  Comment: CGA with 2WW and grab bars with additional assist on O2 line management  CARE Score: 4  Car Transfer  Assistance Needed: Supervision or touching assistance  Comment: CGA with 2WW with additional assist on silva catheter and O2 line management  CARE Score: 4  Discharge Goal: Independent    Ambulation:    Walking Ability  Does the Patient Walk?: Yes     Walk 10 Feet  Assistance Needed: Supervision or touching assistance  Comment: CGA using 2WW with additional assist on O2 line management  CARE Score: 4  Discharge Goal: Independent     Walk 50 Feet with Two Turns  Assistance Needed: Supervision or touching assistance  Comment: CGA using 2WW with additional assist on O2 line management  CARE Score: 4  Discharge Goal: Independent     Walk 150 Feet  Assistance Needed: Supervision or touching assistance  Comment: CGA using 2WW with additional assist on O2 line management  CARE Score: 4  Discharge Goal: Independent     Walking 10 Feet on Uneven Surfaces  Assistance Needed: Supervision or touching assistance  Comment: CGA using 2WW with additional assist on O2 line management  CARE Score: 4  Discharge Goal: Independent     1 Step (Curb)  Assistance Needed: Supervision or touching assistance  Comment: CGA using 2WW with additional assist on O2 line management  CARE Score: 4  Discharge Goal: Independent     4 Steps  Assistance Needed: Supervision or touching assistance  Comment: CGA using railings (usually with step-through pattern) on ascent/descent of 4\"/6\" step heights; required additional assist on O2 line and silva catheter management  CARE Score: 4  Discharge Goal: Independent     12 Steps  Assistance Needed: Supervision or touching assistance  Comment: CGA using railings (usually with step-through pattern) on ascent/descent of 4\"/6\" step heights; required additional assist on O2 line and silva catheter management  CARE Score: 4  Discharge Goal: Independent       Wheelchair:  w/c Ability: Wheelchair Ability  Uses a Wheelchair and/or Scooter?: No                Balance:        Object: Picking Up Object  Assistance Needed: Supervision or touching assistance  Comment: CGA with 2WW and reacher  CARE Score: 4  Discharge Goal: Independent    I      Exam:    Blood pressure 122/73, pulse 64, temperature 98.6 °F (37 °C), temperature source Oral, resp. rate 18, height 5' 8\" (1.727 m), weight 246 lb 0.5 oz (111.6 kg), SpO2 94 %. General: Lying down in bed. Sleepy. In no distress. HEENT: No signs of trauma to his head or neck. Soft-spoken. MMM. No JVD. Pulmonary: Shallow respirations with blunting inferiorly. Cardiac: Regular rate and rhythm. Abdomen: Patient's abdomen is soft and nondistended. Bowel sounds were present throughout. There was no rebound, guarding or masses noted. Upper extremities: Slow and deliberate arm movements with normal tone. No tremor. Lower extremities: Give way with MMT. Calf soft. Heels clear.   Tender to palpation throughout. Sitting balance was fair-.  Standing balance was poor. Lab Results   Component Value Date    WBC 11.2 (H) 05/18/2022    HGB 12.5 (L) 05/18/2022    HCT 38.9 (L) 05/18/2022    .8 (H) 05/18/2022     05/18/2022     Lab Results   Component Value Date    INR 1.12 05/18/2022    INR 1.08 05/17/2022    INR 1.12 05/15/2022    PROTIME 14.4 05/18/2022    PROTIME 13.9 05/17/2022    PROTIME 14.4 05/15/2022     Lab Results   Component Value Date    CREATININE 1.3 05/18/2022    BUN 32 (H) 05/18/2022     05/18/2022    K 4.7 05/18/2022     05/18/2022    CO2 28 05/18/2022     Lab Results   Component Value Date    ALT 46 (H) 05/18/2022    AST 53 (H) 05/18/2022    ALKPHOS 150 (H) 05/18/2022    BILITOT 0.6 05/18/2022       Expected length of stay  prior to a supervised level of function for discharge home with a walker and C OT/PT is 2-1/2 weeks. Recommendations:    1. Hepatic encephalopathy with gait disturbance:  Developing the routine for his daily occupational and physical therapy with speech-language pathology. Trying maximize medical care of his liver disease so that he can be as alert as possible to retain new information from his training. He needs adaptive equipment training, caregiver education and DVT prophylaxis. Offering him a graduated schedule of activities to build tolerance. Nutritional support. Ongoing management by GI and the interventionalists. Verbal cues and maximum physical assistance for transfers. 2. DVT prophylaxis: Lovenox 40 mg subcu daily to resume after his procedure. Weightbearing activities are expected to be limited but will be tried daily. I must periodically monitor his hemoglobin and platelet count while on this medication. GI prophylaxis is available. No signs of acute blood loss. 3. Acute respiratory failure with hypoxia: Thoracentesis completed with little benefit from shortness of breath. He still feels quite tired. Management of the liver disease that is causing the effusion buildup. Bronchodilators periodically as needed. Monitoring his oxygen levels at rest and with activity. 4. Acute on chronic kidney disease: Avoiding wide swings of blood pressure. Avoiding nephrotoxic medications when possible. Nephrology to monitor his recovery. Periodic monitoring of his chemistries. 5. Alcoholic cirrhosis: Xifaxan and Chronulac. Abstinence from alcohol. Avoiding acetaminophen. Diuretics for the ascites. GI to monitor his need for further paracentesis.

## 2022-05-21 LAB
BASOPHILS ABSOLUTE: 0.1 K/CU MM
BASOPHILS RELATIVE PERCENT: 0.7 % (ref 0–1)
DIFFERENTIAL TYPE: ABNORMAL
EOSINOPHILS ABSOLUTE: 0.6 K/CU MM
EOSINOPHILS RELATIVE PERCENT: 5.6 % (ref 0–3)
GLUCOSE BLD-MCNC: 103 MG/DL (ref 70–99)
GLUCOSE BLD-MCNC: 107 MG/DL (ref 70–99)
GLUCOSE BLD-MCNC: 111 MG/DL (ref 70–99)
GLUCOSE BLD-MCNC: 113 MG/DL (ref 70–99)
GLUCOSE BLD-MCNC: 114 MG/DL (ref 70–99)
HCT VFR BLD CALC: 40.7 % (ref 42–52)
HEMOGLOBIN: 13 GM/DL (ref 13.5–18)
IGA: 668 MG/DL (ref 69–382)
IGG,SERUM: 2163 MG/DL (ref 723–1685)
IGM,SERUM: 393 MG/DL (ref 62–277)
IMMATURE NEUTROPHIL %: 0.5 % (ref 0–0.43)
LYMPHOCYTES ABSOLUTE: 1.3 K/CU MM
LYMPHOCYTES RELATIVE PERCENT: 12 % (ref 24–44)
MCH RBC QN AUTO: 32 PG (ref 27–31)
MCHC RBC AUTO-ENTMCNC: 31.9 % (ref 32–36)
MCV RBC AUTO: 100.2 FL (ref 78–100)
MONOCYTES ABSOLUTE: 0.9 K/CU MM
MONOCYTES RELATIVE PERCENT: 7.9 % (ref 0–4)
NUCLEATED RBC %: 0 %
PDW BLD-RTO: 14.6 % (ref 11.7–14.9)
PLATELET # BLD: 264 K/CU MM (ref 140–440)
PMV BLD AUTO: 10.9 FL (ref 7.5–11.1)
RBC # BLD: 4.06 M/CU MM (ref 4.6–6.2)
SEGMENTED NEUTROPHILS ABSOLUTE COUNT: 8 K/CU MM
SEGMENTED NEUTROPHILS RELATIVE PERCENT: 73.3 % (ref 36–66)
TOTAL IMMATURE NEUTOROPHIL: 0.06 K/CU MM
TOTAL NUCLEATED RBC: 0 K/CU MM
WBC # BLD: 10.9 K/CU MM (ref 4–10.5)

## 2022-05-21 PROCEDURE — 97535 SELF CARE MNGMENT TRAINING: CPT

## 2022-05-21 PROCEDURE — 97110 THERAPEUTIC EXERCISES: CPT

## 2022-05-21 PROCEDURE — 82962 GLUCOSE BLOOD TEST: CPT

## 2022-05-21 PROCEDURE — 6370000000 HC RX 637 (ALT 250 FOR IP): Performed by: HOSPITALIST

## 2022-05-21 PROCEDURE — 94761 N-INVAS EAR/PLS OXIMETRY MLT: CPT

## 2022-05-21 PROCEDURE — 97530 THERAPEUTIC ACTIVITIES: CPT

## 2022-05-21 PROCEDURE — 94150 VITAL CAPACITY TEST: CPT

## 2022-05-21 PROCEDURE — 2700000000 HC OXYGEN THERAPY PER DAY

## 2022-05-21 PROCEDURE — 2580000003 HC RX 258: Performed by: HOSPITALIST

## 2022-05-21 PROCEDURE — 97116 GAIT TRAINING THERAPY: CPT

## 2022-05-21 PROCEDURE — 1280000000 HC REHAB R&B

## 2022-05-21 RX ADMIN — SPIRONOLACTONE 100 MG: 50 TABLET ORAL at 09:00

## 2022-05-21 RX ADMIN — SODIUM CHLORIDE, PRESERVATIVE FREE 10 ML: 5 INJECTION INTRAVENOUS at 09:01

## 2022-05-21 RX ADMIN — MAGNESIUM OXIDE 400 MG (241.3 MG MAGNESIUM) TABLET 200 MG: TABLET at 20:19

## 2022-05-21 RX ADMIN — PANTOPRAZOLE SODIUM 40 MG: 40 TABLET, DELAYED RELEASE ORAL at 06:19

## 2022-05-21 RX ADMIN — ALPRAZOLAM 0.5 MG: 0.5 TABLET ORAL at 00:14

## 2022-05-21 RX ADMIN — LEVOTHYROXINE SODIUM 50 MCG: 0.05 TABLET ORAL at 06:19

## 2022-05-21 RX ADMIN — LACTULOSE 20 G: 10 SOLUTION ORAL at 06:19

## 2022-05-21 RX ADMIN — LACTULOSE 20 G: 10 SOLUTION ORAL at 15:46

## 2022-05-21 RX ADMIN — HYDROCORTISONE ACETATE 25 MG: 25 SUPPOSITORY RECTAL at 09:03

## 2022-05-21 RX ADMIN — RIFAXIMIN 550 MG: 550 TABLET ORAL at 09:00

## 2022-05-21 RX ADMIN — FUROSEMIDE 40 MG: 40 TABLET ORAL at 09:00

## 2022-05-21 RX ADMIN — LACTULOSE 20 G: 10 SOLUTION ORAL at 20:20

## 2022-05-21 RX ADMIN — SODIUM CHLORIDE, PRESERVATIVE FREE 10 ML: 5 INJECTION INTRAVENOUS at 20:20

## 2022-05-21 RX ADMIN — MAGNESIUM OXIDE 400 MG (241.3 MG MAGNESIUM) TABLET 200 MG: TABLET at 09:00

## 2022-05-21 RX ADMIN — RIFAXIMIN 550 MG: 550 TABLET ORAL at 20:19

## 2022-05-21 RX ADMIN — NADOLOL 40 MG: 20 TABLET ORAL at 09:03

## 2022-05-21 RX ADMIN — DULOXETINE HYDROCHLORIDE 60 MG: 30 CAPSULE, DELAYED RELEASE ORAL at 20:19

## 2022-05-21 RX ADMIN — HYDROCORTISONE ACETATE 25 MG: 25 SUPPOSITORY RECTAL at 20:20

## 2022-05-21 ASSESSMENT — PAIN SCALES - GENERAL
PAINLEVEL_OUTOF10: 0
PAINLEVEL_OUTOF10: 0

## 2022-05-21 NOTE — CONSULTS
ONCOLOGY HEMATOLOGY CARE ([facility])  CONSULTATION REPORT    REASON FOR CONSULT  To evaluate the patient with monoclonal Free Kappa Light chains (Bence Tanner Protein), note in urine study. CHIEF COMPLAINT    Progressive SOB. HISTORY OF PRESENT ILLNESS   Raul Molina is a 78 y.o. male with medical history significant for GERD, anxiety, HTN, hyperlipidemia, DM, JENNIFER, alcoholic cirrhosis, presented on 5/2/22 with several days of progressive SOB. He required intubation, ventilator support for respiratory failure. He was noted to have pleural effusion and ascites, requiring paracentesis and thoracentesis. He was seen by Dr. Joel Barrientos for elevated serum creatinine level. Work ups showed monoclonal Free Kappa Light chains (Bence Tanner Protein) in urine. SPEP showed increased in gammaglobulins. He is currently in acute rehab. His serum creatinine is back to normal. He doesn't have hypercalcemia. PAST MEDICAL HISTORY    Past Medical History:   Diagnosis Date    Anxiety     Cirrhosis, alcoholic ([address]     Diabetes mellitus ([address]     GERD (gastroesophageal reflux disease) 2000    GERD (gastroesophageal reflux disease)     Hyperlipidemia     Hypertension     Portal hypertension (HCC)     Pulmonary nodules     Sleep apnea     SOB (shortness of breath)     Thyroid disease        SURGICAL HISTORY    Past Surgical History:   Procedure Laterality Date    APPENDECTOMY      CHOLECYSTECTOMY      COLONOSCOPY      ENDOSCOPY, COLON, DIAGNOSTIC      FOOT SURGERY      needle removed,not sure which foot, per wife.     TONSILLECTOMY      UPPER GASTROINTESTINAL ENDOSCOPY N/A 1/19/2022    EGD BIOPSY performed by Gilford Harrow, [facility] at [facility]    Family History   Problem Relation Age of Onset    Hypertension Brother     Diabetes Other        SOCIAL HISTORY    Social History     Socioeconomic History    Marital status:      Spouse name: None    Number of children: None    Years of education: None    Highest education level: None   Occupational History     Comment: Retired    Tobacco Use    Smoking status: Former Smoker     Packs/day: 1.00     Years: 14.00     Pack years: 14.00     Quit date:      Years since quittin.4    Smokeless tobacco: Never Used   Vaping Use    Vaping Use: Never used   Substance and Sexual Activity    Alcohol use: Not Currently    Drug use: Never    Sexual activity: Yes     Partners: Female   Other Topics Concern    None   Social History Narrative    None     Social Determinants of Health     Financial Resource Strain: Low Risk     Difficulty of Paying Living Expenses: Not hard at all   Food Insecurity: No Food Insecurity    Worried About [address] [employer] in the Last Year: Never true    [address] in the Last Year: Never true   Transportation Needs:     Lack of Transportation (Medical): Not on file    Lack of Transportation (Non-Medical):  Not on file   Physical Activity:     Days of Exercise per Week: Not on file    Minutes of Exercise per Session: Not on file   Stress:     Feeling of Stress : Not on file   Social Connections:     Frequency of Communication with Friends and Family: Not on file    Frequency of Social Gatherings with Friends and Family: Not on file    Attends [employer] Services: Not on file    Active Member of [address] [place] [address] or Organizations: Not on file    Attends Club or Organization Meetings: Not on file    Marital Status: Not on file   Intimate Partner Violence:     Fear of Current or Ex-Partner: Not on file    Emotionally Abused: Not on file    Physically Abused: Not on file    Sexually Abused: Not on file   Housing Stability:     Unable to Pay for Housing in the Last Year: Not on file    Number of [place] in the Last Year: Not on file    Unstable Housing in the Last Year: Not on file       REVIEW OF SYSTEMS    Constitutional:  Denies fever, chills, loss of appetite, weight loss, tiredness, fatigue or weakness   HEENT:  Denies swelling of neck glands  Respiratory:  Has cough and shortness of breath. Denies hemoptysis  Cardiovascular:  Denies chest pain, palpitations or swelling   GI:  Denies abdominal pain, nausea, vomiting, constipation or diarrhea   Musculoskeletal:  Denies back pain   Skin:  Denies rash   Neurologic:  Denies headache, focal weakness or sensory changes   All systems negative except as marked. PHYSICAL EXAM    Vitals: BP (!) 125/54   Pulse 65   Temp 98.3 °F (36.8 °C) (Oral)   Resp 16   Ht 5' 7.99\" (1.727 m)   Wt 233 lb 14.5 oz (106.1 kg)   SpO2 93%   BMI 35.57 kg/m²   CONSTITUTIONAL: awake, alert, cooperative, no apparent distress   EYES: pupils equal, round and reactive to light, sclera clear and conjunctiva normal  ENT: Normocephalic, without obvious abnormality, atraumatic, very hard of hearing  NECK: supple, symmetrical, no jugular venous distension and no carotid bruits   HEMATOLOGIC/LYMPHATIC: no cervical, supraclavicular or axillary lymphadenopathy   LUNGS: VBS, no wheezes, diminished breath sounds bilaterally  CARDIOVASCULAR: regular rate and rhythm, normal S1 and S2, no murmur noted  ABDOMEN: normal bowel sounds x 4, soft, non-distended, non-tender, no masses palpated, no hepatosplenomgaly   MUSCULOSKELETAL: full range of motion noted, tone is normal  NEUROLOGIC: awake, alert, oriented to name, place and time. Motor skills grossly intact. SKIN: Normal skin color, texture, turgor and no jaundice.  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 He was incidentally found to have monoclonal free kappa light chains in urine. His creatinine is back to normal and he doesn't have hypercalcemia. His hemoglobin is normal for his age. He may have MGUS. I will request LDH, ESR, beta 2 microglobulin, SPEP with serum immunofixation, serum light chain assay and ration today. I will follow up with the result. If he only has MGUS, we will recommend for close observation. We will follow the patient. Thank you for allowing me to participate in the care of this very pleasant patient.

## 2022-05-21 NOTE — PROGRESS NOTES
Occupational Therapy  Facility/Department: [facility]U  Daily Treatment Note  NAME: Ori Mondragon  : 1945  MRN: [id number]    Date of Service: 2022    Discharge Recommendations:            Patient Diagnosis(es): There were no encounter diagnoses. Assessment      Pt demos increased lethargy- requires increased v/cing for initiation and following cues/commands      Plan         Restrictions  Restrictions/Precautions  Restrictions/Precautions: Fall Risk,General Precautions  Position Activity Restriction  Other position/activity restrictions: , PICC line RUE, silva catheter, 2L O2    Subjective               Objective    Vitals   O2 89-90 without O2 donned and 98-99 with O2                             Goals  Short Term Goals  Time Frame for Short term goals: STGs=LTGs  Long Term Goals  Time Frame for Long term goals : 7-10 days or until d/c. Long Term Goal 1: Pt will self-feed c Ind. Long Term Goal 2: Pt will complete grooming tasks c Mod I.  Long Term Goal 3: Pt will complete total body bathing c AE PRN and Mod I.  Long Term Goal 4: Pt will complete UB dressing c Ind. Long Term Goal 5: Pt will complete LB dressing c AE PRN and Mod I. Additional Goals?: Yes  Long Term Goal 6: Pt will doff/don footwear c AE PRN and Mod I.  Long Term Goal 7: Pt will complete toileting c Mod I.  Long Term Goal 8: Pt will perform functional transfers (bed, chair, toilet, shower) c DME PRN and Mod I.  Long Term Goal 9: Pt will perform therex/therax to facilitate an increase in strength/endurance/ax tolerance (c emphasis on dynamic standing balance/tolerance > 8 mins) c Mod I.  Long Term Goal 10: Pt will complete oral hygiene c Mod I.        Therapy Time   Individual Concurrent Group Co-treatment   Time In           Time Out           Minutes                   NICHOLAS Campos      Physical Rehabilitation: OCCUPATIONAL THERAPY     [x] daily progress note       [] discharge       Patient Name:  Ori Mondragon   : 1945 MRN: [id number]  Room:  [address] [place] [address] Date of Admission: 5/18/2022  Rehabilitation Diagnosis:   Hepatic failure, unspecified with coma [K72.91]  Hepatic encephalopathy ([address] [K72.90]       Date 5/21/2022       Day of ARU Week:  4   Time IN//1032   Individual Tx Minutes 60   Group Tx Minutes    Co-Treat Minutes    Concurrent Tx Minutes    TOTAL Tx Time Mins 60   Variance Time    Variance Time []   Refusal due to:     []   Medical hold/reason:    []   Illness   []   Off Unit for test/procedure  []   Extra time needed to complete task  []   Therapeutic need  []   Other (specify):   Restrictions Restrictions/Precautions: Fall Risk,General Precautions         Communication with other providers: [x]   OK to see per nursing:     []   Spoke with team member regarding:      Subjective observations and cognitive status:      Pain level/location:    0/10       Location:    Discharge recommendations  Anticipated discharge date:  tbd  Destination: []home alone   []home alone w assist prn   [] home w/ family    [] Continuous supervision       []SNF    [] Assisted living     [] Other:   Continued therapy: []HHC OT  []OUTPATIENT  OT   [] No Further OT  Equipment needs: tbd     Toileting:   Supervision-SBA for hygiene in standing        UB/LB bathing- Supervision and CGA in standing for increased safety with standing balance pt demos occasional dizziness in standing with task.  Requires mod v/cing for safety awareness with standing/using grab bars for increased support       Toilet Transfers:   SBA/CGA with transitional turns  Device Used:    [x]   Standard Toilet         [x]   Grab Bars           []  Bedside Commode       []   Elevated Toilet          []   Other:        Bed Mobility:           [x]   Pt received out of bed     Transfers:    Sit--> Stand:  SBA  Stand --> Sit:   SBA  Stand-Pivot:   CGA  Other:    Assistive device required for transfer:   FWW      Functional Mobility:    Assistance:  SBA/CGA with turns Device:   []   Rolling Walker     [x]   Standard Duey Contes []   Wheelchair        []   [employer]       []   4-Wheeled Duey Contes         []   Cardiac Duey Contes       []   Other:          Additional Therapeutic activities/exercises completed this date:     [x]   ADL Training   []   Balance/Postural training     []   Bed/Transfer Training   []   Endurance Training   []   Neuromuscular Re-ed   []   Nu-step:  Time:        Level:         #Steps:       []   Rebounder:    []  Seated     []  Standing        []   Supine Ther Ex (reps/sets):     []   Seated Ther Ex (reps/sets):     []   Standing Ther Ex (reps/sets):     []   Other:      Comments:      Patient/Caregiver Education and Training:   []   [employer] Equipment Use  []   Bed Mobility/Transfer Technique/Safety  [x]   Energy Conservation Tips  []   Family training  []   Postural Awareness  [x]   Safety During Functional Activities - pt demos compromised safety awareness during bathing task requires increased   []   Reinforced Patient's Precautions   []   Progress was updated and reviewed in Rehabtracker with patient and/or family this         date.     Treatment Plan for Next Session: continue POC as tolerated         Treatment/Activity Tolerance:   [x] Tolerated treatment with no adverse effects    [] Patient limited by fatigue  [] Patient limited by pain   [] Patient limited by medical complications:    [] Adverse reaction to Tx:   [] Significant change in status    Safety:       []  bed alarm set    [x]  chair alarm set    []  Pt refused alarms                []  Telesitter activated      [x]  Gait belt used during tx session      []other:       Number of Minutes/Billable Intervention  Therapeutic Exercise    ADL Self-care 60   Neuro Re-Ed    Therapeutic Activity    Group    Other:    TOTAL 60       Social History  Social/Functional History  Lives With: Spouse  Type of Home: House  Home Layout: One level  Home Access: Stairs to enter with rails  Entrance Stairs - Number of Steps: 4 [address]AZ[address] front of the home and 5 [address]AZ[address] at the back of the home. HRs c both sets of stairs. Entrance Stairs - Rails: Both  Bathroom Shower/Tub: Walk-in shower  Bathroom Toilet: Standard  Bathroom Equipment:  (Per Pt's spouse, Pt has no equipment in the home.)  Bathroom Accessibility: Accessible  Has the patient had two or more falls in the past year or any fall with injury in the past year?: Yes (Pt reports 2 falls in the last year--fall when pulled by dog. Second fall on back porch due to LOB and stairs.)  ADL Assistance: Independent  Homemaking Assistance: Needs assistance  Homemaking Responsibilities: Yes  Meal Prep Responsibility: No (Wife completes.)  Laundry Responsibility: No (Wife completes.)  Cleaning Responsibility: No (Wife completes.)  Bill Paying/Finance Responsibility: No (Wife completes.)  Shopping Responsibility: No (Wife completes.)  Health Care Management: Primary (Pt manages medications c 7-day pillbox.)  Ambulation Assistance: Independent  Transfer Assistance: Independent  Active : Yes  Mode of Transportation: Car,Truck,Motorcycle  Education: 8th grade; some difficulty in school  Occupation: Retired  Type of Occupation:   Leisure & Hobbies: Likes to work on cars, cats and a Hungarian monika, wife does grocery shopping, out to eat. Pt manages medications c 7-day pillbox. Wife manages bills. Pt manages yardwork  IADL Comments: Likes to surf the internet  Additional Comments: Pt reports 2 falls in the last year--fall when pulled by dog. Second fall on back porch due to LOB and stairs. Objective                                                                                    Goals:  (Update in navigator)  Short Term Goals  Time Frame for Short term goals: STGs=LTGs:  Long Term Goals  Time Frame for Long term goals : 7-10 days or until d/c. Long Term Goal 1: Pt will self-feed c Ind.   Long Term Goal 2: Pt will complete grooming tasks c Mod I.  Long Term Goal 3: Pt will complete total body bathing c AE PRN and Mod I.  Long Term Goal 4: Pt will complete UB dressing c Ind. Long Term Goal 5: Pt will complete LB dressing c AE PRN and Mod I. Additional Goals?: Yes  Long Term Goal 6: Pt will doff/don footwear c AE PRN and Mod I.  Long Term Goal 7: Pt will complete toileting c Mod I.  Long Term Goal 8: Pt will perform functional transfers (bed, chair, toilet, shower) c DME PRN and Mod I.  Long Term Goal 9: Pt will perform therex/therax to facilitate an increase in strength/endurance/ax tolerance (c emphasis on dynamic standing balance/tolerance > 8 mins) c Mod I.  Long Term Goal 10: Pt will complete oral hygiene c Mod I.:        Plan of Care                                                                              Times per week: 5 days per week for a minimum of 60 minutes/day plus group as appropriate for 60 minutes.   Treatment to include Plan  Times per Day: Daily  Current Treatment Recommendations: Strengthening,Balance training,Functional mobility training,Endurance training,Pain management,Safety education & training,Patient/Caregiver education & training,Self-Care / ADL,Home management training,Neuromuscular re-education,Cognitive reorientation,Equipment evaluation, education, & procurement,Coordination training,Cognitive/Perceptual training    Electronically signed by   NICHOLAS Becerra,  5/21/2022, 12:05 PM

## 2022-05-22 LAB
BETA-2 MICROGLOBULIN: 5.6 MG/L
ERYTHROCYTE SEDIMENTATION RATE: 28 MM/HR (ref 0–20)
GLUCOSE BLD-MCNC: 109 MG/DL (ref 70–99)
GLUCOSE BLD-MCNC: 120 MG/DL (ref 70–99)
GLUCOSE BLD-MCNC: 163 MG/DL (ref 70–99)
GLUCOSE BLD-MCNC: 91 MG/DL (ref 70–99)
GLUCOSE BLD-MCNC: 94 MG/DL (ref 70–99)
KAPPA QUANT FREE LIGHT CHAINS: 129.09 MG/L (ref 3.3–19.4)
KAPPA/LAMBDA FREE LIGHT CHAIN RATIO: 1.44 (ref 0.26–1.65)
LAMBDA FREE LIGHT CHAINS QNT: 89.84 MG/L (ref 5.71–26.3)

## 2022-05-22 PROCEDURE — 51702 INSERT TEMP BLADDER CATH: CPT

## 2022-05-22 PROCEDURE — 6370000000 HC RX 637 (ALT 250 FOR IP): Performed by: HOSPITALIST

## 2022-05-22 PROCEDURE — 2580000003 HC RX 258: Performed by: HOSPITALIST

## 2022-05-22 PROCEDURE — 1280000000 HC REHAB R&B

## 2022-05-22 PROCEDURE — 36415 COLL VENOUS BLD VENIPUNCTURE: CPT

## 2022-05-22 PROCEDURE — 2700000000 HC OXYGEN THERAPY PER DAY

## 2022-05-22 PROCEDURE — 94761 N-INVAS EAR/PLS OXIMETRY MLT: CPT

## 2022-05-22 PROCEDURE — 85652 RBC SED RATE AUTOMATED: CPT

## 2022-05-22 PROCEDURE — 94150 VITAL CAPACITY TEST: CPT

## 2022-05-22 PROCEDURE — 82962 GLUCOSE BLOOD TEST: CPT

## 2022-05-22 RX ADMIN — RIFAXIMIN 550 MG: 550 TABLET ORAL at 09:01

## 2022-05-22 RX ADMIN — SODIUM CHLORIDE, PRESERVATIVE FREE 10 ML: 5 INJECTION INTRAVENOUS at 20:41

## 2022-05-22 RX ADMIN — NADOLOL 40 MG: 20 TABLET ORAL at 09:01

## 2022-05-22 RX ADMIN — PANTOPRAZOLE SODIUM 40 MG: 40 TABLET, DELAYED RELEASE ORAL at 06:17

## 2022-05-22 RX ADMIN — MAGNESIUM OXIDE 400 MG (241.3 MG MAGNESIUM) TABLET 200 MG: TABLET at 20:40

## 2022-05-22 RX ADMIN — LEVOTHYROXINE SODIUM 50 MCG: 0.05 TABLET ORAL at 06:17

## 2022-05-22 RX ADMIN — RIFAXIMIN 550 MG: 550 TABLET ORAL at 20:41

## 2022-05-22 RX ADMIN — FUROSEMIDE 40 MG: 40 TABLET ORAL at 09:01

## 2022-05-22 RX ADMIN — MAGNESIUM OXIDE 400 MG (241.3 MG MAGNESIUM) TABLET 200 MG: TABLET at 09:01

## 2022-05-22 RX ADMIN — INSULIN LISPRO 2 UNITS: 100 INJECTION, SOLUTION INTRAVENOUS; SUBCUTANEOUS at 17:09

## 2022-05-22 RX ADMIN — HYDROCORTISONE ACETATE 25 MG: 25 SUPPOSITORY RECTAL at 20:40

## 2022-05-22 RX ADMIN — SODIUM CHLORIDE, PRESERVATIVE FREE 10 ML: 5 INJECTION INTRAVENOUS at 09:02

## 2022-05-22 RX ADMIN — LACTULOSE 20 G: 10 SOLUTION ORAL at 06:17

## 2022-05-22 RX ADMIN — LACTULOSE 20 G: 10 SOLUTION ORAL at 15:41

## 2022-05-22 RX ADMIN — DULOXETINE HYDROCHLORIDE 60 MG: 30 CAPSULE, DELAYED RELEASE ORAL at 20:40

## 2022-05-22 RX ADMIN — LACTULOSE 20 G: 10 SOLUTION ORAL at 20:41

## 2022-05-22 RX ADMIN — SPIRONOLACTONE 100 MG: 50 TABLET ORAL at 09:01

## 2022-05-22 RX ADMIN — HYDROCORTISONE ACETATE 25 MG: 25 SUPPOSITORY RECTAL at 09:01

## 2022-05-22 ASSESSMENT — PAIN SCALES - GENERAL
PAINLEVEL_OUTOF10: 0

## 2022-05-22 NOTE — PLAN OF CARE
Problem: Confusion  Goal: Confusion, delirium, dementia, or psychosis is improved or at baseline  Description: INTERVENTIONS:  1. Assess for possible contributors to thought disturbance, including medications, impaired vision or hearing, underlying metabolic abnormalities, dehydration, psychiatric diagnoses, and notify attending LIP  2. [place] high risk fall precautions, as indicated  3. Provide frequent short contacts to provide reality reorientation, refocusing and direction  4. Decrease environmental stimuli, including noise as appropriate  5. Monitor and intervene to maintain adequate nutrition, hydration, elimination, sleep and activity  6. If unable to ensure safety without constant attention obtain sitter and review sitter guidelines with assigned personnel  7.  Initiate Psychosocial CNS and Spiritual Care consult, as indicated  Flowsheets (Taken 5/22/2022 1131)  Effect of thought disturbance (confusion, delirium, dementia, or psychosis) are managed with adequate functional status:   [place] high risk fall precautions, as indicated   Monitor and intervene to maintain adequate nutrition, hydration, elimination, sleep and activity   Assess for contributors to thought disturbance, including medications, impaired vision or hearing, underlying metabolic abnormalities, dehydration, psychiatric diagnoses, notify LIP

## 2022-05-23 LAB
ALBUMIN SERPL-MCNC: 2.9 GM/DL (ref 3.4–5)
ALP BLD-CCNC: 128 IU/L (ref 40–129)
ALT SERPL-CCNC: 32 U/L (ref 10–40)
AMMONIA: 34 UMOL/L (ref 16–60)
ANION GAP SERPL CALCULATED.3IONS-SCNC: 8 MMOL/L (ref 4–16)
AST SERPL-CCNC: 41 IU/L (ref 15–37)
BACTERIA: ABNORMAL /HPF
BILIRUB SERPL-MCNC: 0.6 MG/DL (ref 0–1)
BILIRUBIN URINE: NEGATIVE MG/DL
BLOOD, URINE: ABNORMAL
BUN BLDV-MCNC: 31 MG/DL (ref 6–23)
CALCIUM SERPL-MCNC: 10.2 MG/DL (ref 8.3–10.6)
CHLORIDE BLD-SCNC: 98 MMOL/L (ref 99–110)
CLARITY: CLEAR
CO2: 30 MMOL/L (ref 21–32)
COLOR: YELLOW
CREAT SERPL-MCNC: 1.5 MG/DL (ref 0.9–1.3)
CULTURE: ABNORMAL
GFR AFRICAN AMERICAN: 55 ML/MIN/1.73M2
GFR NON-AFRICAN AMERICAN: 46 ML/MIN/1.73M2
GLUCOSE BLD-MCNC: 105 MG/DL (ref 70–99)
GLUCOSE BLD-MCNC: 106 MG/DL (ref 70–99)
GLUCOSE BLD-MCNC: 117 MG/DL (ref 70–99)
GLUCOSE BLD-MCNC: 124 MG/DL (ref 70–99)
GLUCOSE BLD-MCNC: 129 MG/DL (ref 70–99)
GLUCOSE BLD-MCNC: 158 MG/DL (ref 70–99)
GLUCOSE, URINE: NEGATIVE MG/DL
GRAM SMEAR: ABNORMAL
HCT VFR BLD CALC: 41.1 % (ref 42–52)
HEMOGLOBIN: 13.3 GM/DL (ref 13.5–18)
HYALINE CASTS: 0 /LPF
KETONES, URINE: NEGATIVE MG/DL
LEUKOCYTE ESTERASE, URINE: ABNORMAL
Lab: ABNORMAL
MCH RBC QN AUTO: 32 PG (ref 27–31)
MCHC RBC AUTO-ENTMCNC: 32.4 % (ref 32–36)
MCV RBC AUTO: 99 FL (ref 78–100)
MUCUS: ABNORMAL HPF
NITRITE URINE, QUANTITATIVE: NEGATIVE
PDW BLD-RTO: 15 % (ref 11.7–14.9)
PH, URINE: 5 (ref 5–8)
PLATELET # BLD: 280 K/CU MM (ref 140–440)
PMV BLD AUTO: 10.9 FL (ref 7.5–11.1)
POTASSIUM SERPL-SCNC: 5.1 MMOL/L (ref 3.5–5.1)
PROTEIN UA: NEGATIVE MG/DL
RBC # BLD: 4.15 M/CU MM (ref 4.6–6.2)
RBC URINE: 4 /HPF (ref 0–3)
SODIUM BLD-SCNC: 136 MMOL/L (ref 135–145)
SPECIFIC GRAVITY UA: 1.02 (ref 1–1.03)
SPECIMEN: ABNORMAL
TOTAL PROTEIN: 7.1 GM/DL (ref 6.4–8.2)
TRICHOMONAS: ABNORMAL /HPF
UROBILINOGEN, URINE: 0.2 MG/DL (ref 0.2–1)
WBC # BLD: 9.5 K/CU MM (ref 4–10.5)
WBC UA: 2 /HPF (ref 0–2)

## 2022-05-23 PROCEDURE — 82140 ASSAY OF AMMONIA: CPT

## 2022-05-23 PROCEDURE — 82962 GLUCOSE BLOOD TEST: CPT

## 2022-05-23 PROCEDURE — 1280000000 HC REHAB R&B

## 2022-05-23 PROCEDURE — 6370000000 HC RX 637 (ALT 250 FOR IP): Performed by: HOSPITALIST

## 2022-05-23 PROCEDURE — 97530 THERAPEUTIC ACTIVITIES: CPT

## 2022-05-23 PROCEDURE — 99232 SBSQ HOSP IP/OBS MODERATE 35: CPT | Performed by: PHYSICAL MEDICINE & REHABILITATION

## 2022-05-23 PROCEDURE — 97129 THER IVNTJ 1ST 15 MIN: CPT

## 2022-05-23 PROCEDURE — 97110 THERAPEUTIC EXERCISES: CPT

## 2022-05-23 PROCEDURE — 36415 COLL VENOUS BLD VENIPUNCTURE: CPT

## 2022-05-23 PROCEDURE — 51702 INSERT TEMP BLADDER CATH: CPT

## 2022-05-23 PROCEDURE — 81001 URINALYSIS AUTO W/SCOPE: CPT

## 2022-05-23 PROCEDURE — 97130 THER IVNTJ EA ADDL 15 MIN: CPT

## 2022-05-23 PROCEDURE — 97535 SELF CARE MNGMENT TRAINING: CPT

## 2022-05-23 PROCEDURE — 97116 GAIT TRAINING THERAPY: CPT

## 2022-05-23 PROCEDURE — 85027 COMPLETE CBC AUTOMATED: CPT

## 2022-05-23 PROCEDURE — 87086 URINE CULTURE/COLONY COUNT: CPT

## 2022-05-23 PROCEDURE — 2580000003 HC RX 258: Performed by: HOSPITALIST

## 2022-05-23 PROCEDURE — 80053 COMPREHEN METABOLIC PANEL: CPT

## 2022-05-23 RX ADMIN — SODIUM CHLORIDE, PRESERVATIVE FREE 10 ML: 5 INJECTION INTRAVENOUS at 08:17

## 2022-05-23 RX ADMIN — HYDROCORTISONE ACETATE 25 MG: 25 SUPPOSITORY RECTAL at 21:58

## 2022-05-23 RX ADMIN — DULOXETINE HYDROCHLORIDE 60 MG: 30 CAPSULE, DELAYED RELEASE ORAL at 21:59

## 2022-05-23 RX ADMIN — SPIRONOLACTONE 100 MG: 50 TABLET ORAL at 08:16

## 2022-05-23 RX ADMIN — NADOLOL 40 MG: 20 TABLET ORAL at 08:15

## 2022-05-23 RX ADMIN — INSULIN LISPRO 1 UNITS: 100 INJECTION, SOLUTION INTRAVENOUS; SUBCUTANEOUS at 02:15

## 2022-05-23 RX ADMIN — SODIUM CHLORIDE, PRESERVATIVE FREE 10 ML: 5 INJECTION INTRAVENOUS at 22:21

## 2022-05-23 RX ADMIN — RIFAXIMIN 550 MG: 550 TABLET ORAL at 21:58

## 2022-05-23 RX ADMIN — LACTULOSE 20 G: 10 SOLUTION ORAL at 14:55

## 2022-05-23 RX ADMIN — HYDROCORTISONE ACETATE 25 MG: 25 SUPPOSITORY RECTAL at 08:17

## 2022-05-23 RX ADMIN — FUROSEMIDE 40 MG: 40 TABLET ORAL at 08:16

## 2022-05-23 RX ADMIN — MAGNESIUM OXIDE 400 MG (241.3 MG MAGNESIUM) TABLET 200 MG: TABLET at 08:16

## 2022-05-23 RX ADMIN — RIFAXIMIN 550 MG: 550 TABLET ORAL at 08:16

## 2022-05-23 RX ADMIN — LACTULOSE 20 G: 10 SOLUTION ORAL at 06:15

## 2022-05-23 RX ADMIN — PANTOPRAZOLE SODIUM 40 MG: 40 TABLET, DELAYED RELEASE ORAL at 05:44

## 2022-05-23 RX ADMIN — LEVOTHYROXINE SODIUM 50 MCG: 0.05 TABLET ORAL at 05:44

## 2022-05-23 RX ADMIN — MAGNESIUM OXIDE 400 MG (241.3 MG MAGNESIUM) TABLET 200 MG: TABLET at 21:58

## 2022-05-23 RX ADMIN — LACTULOSE 20 G: 10 SOLUTION ORAL at 21:58

## 2022-05-23 NOTE — PROGRESS NOTES
Occupational Therapy    Physical Rehabilitation: OCCUPATIONAL THERAPY     [x] daily progress note       [] discharge       Patient Name:  Miracle Ruano   :  1945 MRN: [id number]  Room:  [address] [place], [place] [address] Date of Admission: 2022  Rehabilitation Diagnosis:   Hepatic failure, unspecified with coma [K72.91]  Hepatic encephalopathy ([facility] [address] [K72.90]       Date 2022       Day of ARU Week:  6   Time IN/OUT 1056/1202  1312/1336   Individual Tx Minutes 66 + 27   Group Tx Minutes    Co-Treat Minutes    Concurrent Tx Minutes    TOTAL Tx Time Mins 93   Variance Time +3 (informed Carson Rivas speech therapist of variance)   Variance Time []   Refusal due to:     []   Medical hold/reason:    []   Illness   []   Off Unit for test/procedure  [x]   Extra time needed to complete task  []   Therapeutic need  []   Other (specify):   Restrictions Restrictions/Precautions: Fall Risk,General Precautions         Communication with other providers: [x]   OK to see per nursing:     [x]   Spoke with speech therapist, Verta Rob regarding:  Pt not wanting to participate in 1st session c Verta Rob coming in to encourage Pt to complete his therapy for the day. Subjective observations and cognitive status: Upon entrance to first session, Pt sleeping in chair c wife in room. Pt stating no to participating in therapy and reported \"I just don't feel like it today\". Pt's wife stated Pt did not sleep last night. Carson Rivas speech therapist came in and discussed participation in therapy and Pt began to participate. Pt's wife reported she had completed all of Pt's ADLs this morning c this therapist encouraging her to allow therapy to assist c ADLs in the future. Upon entrance to second session, nursing in room assisting Pt to bathroom. Pt pleasant and agreeable to participate in therapy during this session.      Pain level/location:    /10       Location: None reported   Discharge recommendations  Anticipated discharge date:  TBD  Destination: []home 4  Discharge Goal: Independent  Device Used:    [x]   Standard Toilet         [x]   Grab Bars           []  Bedside Commode       []   Elevated Toilet          []   Other:        Bed Mobility:           [x]   Pt received out of bed     Transfers:    Sit--> Stand:  SBA  Stand --> Sit:   SBA  Stand-Pivot:   CG/SBA  Other:    Assistive device required for transfer:   2WW      Functional Mobility:    Assistance:  ~ 75 ft x 1, ~ 50 ft x 1, ~ 25 ft x 2  Device:   [x]   Rolling Walker     []   Standard Walker []   Wheelchair        []   [employer]       []   4-Wheeled [place]         []   Cardiac Walker       []   Other:          Additional Therapeutic activities/exercises completed this date:     [x]   ADL Training   [x]   Balance/Postural training   Pt engaged in dynamic standing balance task involving reaching out of [place] and across midline to attach playing cards to their corresponding match on wall poster. Pt consistently reaching for unilateral support on walker for balance. Performed to increase Pt's standing balance for increased safety/independence c ADLs. [x]   Bed/Transfer Training   [x]   Endurance Training   []   Neuromuscular Re-ed   []   Nu-step:  Time:        Level:         #Steps:       []   Rebounder:    []  Seated     []  Standing        []   Supine Ther Ex (reps/sets):     [x]   Seated Ther Ex (reps/sets): Pt engaged in BUE strengthening exercises c 3# dumbbell including chest press, bicep curls, overhead press, and shoulder horizontal abduction, 1 set of 15 reps. Pt required verbal cues for correct technique and for counting reps. Performed to increase Pt's BUE strength and endurance for ADLs. []   Standing Ther Ex (reps/sets):     []   Other:      Comments: All intervention performed to increase pt's endurance, ax tolerance, balance, strength, and I c ADLs/IADLs and functional transfers/mobility.      Patient/Caregiver Education and Training:   []   Adaptive Equipment Use  [x]   Bed Mobility/Transfer Technique/Safety  []   Energy Conservation Tips  []   Family training  []   Postural Awareness  [x]   Safety During Functional Activities  []   Reinforced Patient's Precautions   []   Progress was updated and reviewed in Rehabtracker with patient and/or family this         date. Treatment Plan for Next Session: Continue OT POC      Assessment: This pt demonstrated a negative response to today's treatment as evidenced by not wanting to participate and requiring other therapist to come in and encourage Pt to complete therapy. The patient is making good progress toward established goals as evidenced by QI scores. Ongoing deficits are observed in the areas of functional balance, cognition, endurance, and ADLs and continued focus on this is recommended. Treatment/Activity Tolerance:   [x] Tolerated treatment with no adverse effects    [] Patient limited by fatigue  [] Patient limited by pain   [] Patient limited by medical complications:    [] Adverse reaction to Tx:   [] Significant change in status    Safety:       []  bed alarm set    [x]  chair alarm set    []  Pt refused alarms                []  Telesitter activated      [x]  Gait belt used during tx session      []other:       Number of Minutes/Billable Intervention  Therapeutic Exercise 36   ADL Self-care    Neuro Re-Ed    Therapeutic Activity 57   Group    Other:    TOTAL 93       Social History  Social/Functional History  Lives With: Spouse  Type of Home: House  Home Layout: One level  Home Access: Stairs to enter with rails  Entrance Stairs - Number of Steps: 4 CORAZON front of the home and 5 CORAZON at the back of the home. HRs c both sets of stairs.   Entrance Stairs - Rails: Both  Bathroom Shower/Tub: Walk-in shower  Bathroom Toilet: Standard  Bathroom Equipment:  (Per Pt's spouse, Pt has no equipment in the home.)  Bathroom Accessibility: Accessible  Has the patient had two or more falls in the past year or any fall with injury in the past year?: Yes (Pt reports 2 falls in the last year--fall when pulled by dog. Second fall on back porch due to LOB and stairs.)  ADL Assistance: Independent  Homemaking Assistance: Needs assistance  Homemaking Responsibilities: Yes  Meal Prep Responsibility: No (Wife completes.)  Laundry Responsibility: No (Wife completes.)  Cleaning Responsibility: No (Wife completes.)  Bill Paying/Finance Responsibility: No (Wife completes.)  Shopping Responsibility: No (Wife completes.)  Health Care Management: Primary (Pt manages medications c 7-day pillbox.)  Ambulation Assistance: Independent  Transfer Assistance: Independent  Active : Yes  Mode of Transportation: Car,Truck,Motorcycle  Education: 8th grade; some difficulty in school  Occupation: Retired  Type of Occupation:   Leisure & Hobbies: Likes to work on cars, cats and a [place] monika, wife does grocery shopping, out to eat. Pt manages medications c 7-day pillbox. Wife manages bills. Pt manages yardwork  IADL Comments: Likes to surf the internet  Additional Comments: Pt reports 2 falls in the last year--fall when pulled by dog. Second fall on back porch due to LOB and stairs. Objective                                                                                    Goals:  (Update in navigator)  Short Term Goals  Time Frame for Short term goals: STGs=LTGs:  Long Term Goals  Time Frame for Long term goals : 7-10 days or until d/c. Long Term Goal 1: Pt will self-feed c Ind. Long Term Goal 2: Pt will complete grooming tasks c Mod I.  Long Term Goal 3: Pt will complete total body bathing c AE PRN and Mod I.  Long Term Goal 4: Pt will complete UB dressing c Ind. Long Term Goal 5: Pt will complete LB dressing c AE PRN and Mod I.   Additional Goals?: Yes  Long Term Goal 6: Pt will doff/don footwear c AE PRN and Mod I.  Long Term Goal 7: Pt will complete toileting c Mod I.  Long Term Goal 8: Pt will perform functional transfers (bed, chair, toilet, shower) c DME PRN and Mod I.  Long Term Goal 9: Pt will perform therex/therax to facilitate an increase in strength/endurance/ax tolerance (c emphasis on dynamic standing balance/tolerance > 8 mins) c Mod I.  Long Term Goal 10: Pt will complete oral hygiene c Mod I.:        Plan of Care                                                                              Times per week: 5 days per week for a minimum of 60 minutes/day plus group as appropriate for 60 minutes.   Treatment to include Plan  Times per Day: Daily  Current Treatment Recommendations: Strengthening,Balance training,Functional mobility training,Endurance training,Pain management,Safety education & training,Patient/Caregiver education & training,Self-Care / ADL,Home management training,Neuromuscular re-education,Cognitive reorientation,Equipment evaluation, education, & procurement,Coordination training,Cognitive/Perceptual training    Electronically signed by   TROY Walker, OTR/L  5/23/2022, 2:47 PM
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
## 2022-05-23 NOTE — PROGRESS NOTES
Lucy Peña    : 1945  Acct #: [de-identified]  MRN: [id number]              PM&R Progress Note      Admitting diagnosis: Hepatic encephalopathy ( [address])     Comorbid diagnoses impacting rehabilitation: Generalized weakness, gait disturbance, dysphagia, acute respiratory failure with hypoxia, pleural effusions, alcoholic cirrhosis, acute on chronic kidney disease, hypothyroidism, obesity (BMI 37.3)    Chief complaint: Uncomfortable in bed with poor sleep. No particular dyspnea, cough or belly pain. Prior (baseline) level of function: Independent. Current level of function:         Current  IRF-VAMSI and Goals:   Occupational Therapy:    Short Term Goals  Time Frame for Short term goals: STGs=LTGs :   Long Term Goals  Time Frame for Long term goals : 7-10 days or until d/c. Long Term Goal 1: Pt will self-feed c Ind. Long Term Goal 2: Pt will complete grooming tasks c Mod I.  Long Term Goal 3: Pt will complete total body bathing c AE PRN and Mod I.  Long Term Goal 4: Pt will complete UB dressing c Ind. Long Term Goal 5: Pt will complete LB dressing c AE PRN and Mod I. Additional Goals?: Yes  Long Term Goal 6: Pt will doff/don footwear c AE PRN and Mod I.  Long Term Goal 7: Pt will complete toileting c Mod I.  Long Term Goal 8: Pt will perform functional transfers (bed, chair, toilet, shower) c DME PRN and Mod I.  Long Term Goal 9: Pt will perform therex/therax to facilitate an increase in strength/endurance/ax tolerance (c emphasis on dynamic standing balance/tolerance > 8 mins) c Mod I.  Long Term Goal 10: Pt will complete oral hygiene c Mod I. :                                       Eating: Eating  Assistance Needed: Setup or clean-up assistance  Comment: Pt reports he has had some difficulty opening packages/containers.   CARE Score: 5  Discharge Goal: Independent       Oral Hygiene: Oral Hygiene  Assistance Needed: Supervision or touching assistance  Comment: Pt required verbal cues for sequencing as Pt tried to squeeze toothpaste onto brush before removing cap. CARE Score: 4  Discharge Goal: Independent    UB/LB Bathing: Shower/Bathe Self  Assistance Needed: Supervision or touching assistance  Comment: Pt required Max verbal cues for sequencing UB/LB bathing. Pt able to complete sink side washing up c CG/SBA. Pt seated for majority. CARE Score: 4  Discharge Goal: Independent    UB Dressing: Upper Body Dressing  Assistance Needed: Supervision or touching assistance  Comment: Pt required cues to manage O2 line when donning/doffing pullover tshirt. CARE Score: 4  Discharge Goal: Independent         LB Dressing: Lower Body Dressing  Assistance Needed: Partial/moderate assistance  Comment: Pt required Min A d/t needing assist c silva cath into depends and pants. CARE Score: 3  Discharge Goal: Independent    Donning and [place] Footwear: Putting On/Taking Off Footwear  Assistance Needed: Supervision or touching assistance  Comment: Supervision to doff/don socks using figure four method. CARE Score: 4  Discharge Goal: Independent      Toiletin [address] needed: Supervision or touching assistance  Comment: SBA in stance to manage depends and shorts. CARE Score: 4  Discharge Goal: Independent      Toilet Transfers: Toilet Transfer  Assistance needed: Supervision or touching assistance  Comment: SBA c 2WW and grab bars. CARE Score: 4  Discharge Goal: Independent    Physical Therapy:         Long Term Goals  Time Frame for Long term goals : 7-10 tx days:  Long term goal 1: Pt will complete bed mobility (scooting, rolling R/L, and sup<->sit) Ind.  Long term goal 2: Pt will complete OOB transfers Mod Ind-Ind. Long term goal 3: Pt will ambulate >/=150 ft over level surface and at least 10 ft of uneven surface using 2WW Mod Ind. Long term goal 4: Pt will ascend/descend curb step using 2WW and 1 flight of stairs using railings Mod Ind.   Long term goal 5: Pt will complete object retrieval from the floor with 2WW and reacher Mod Ind.       Bed Mobility:   Sit to Lying  Assistance Needed: Supervision or touching assistance  Comment: SBA-Sup without use of bed features with additional assist on silva catheter management  CARE Score: 4  Discharge Goal: Independent  Roll Left and Right  Assistance Needed: Supervision or touching assistance  Comment: SBA-Sup without use of bed features; required additional assist on silva catherer management prior to rolling to R as silva catheter pre-positioned on LLE  CARE Score: 4  Discharge Goal: Independent  Lying to Sitting on Side of Bed  Assistance Needed: Supervision or touching assistance  Comment: SBA without use of bed features towards L side of bed where silva catheter is pre-positioned; reports mild lightneadedness upon sitting up but no trunk swaying observed  CARE Score: 4  Discharge Goal: Independent    Transfers:    Sit to Stand  Assistance Needed: Supervision or touching assistance  Comment: supervision  CARE Score: 4  Discharge Goal: Independent  Chair/Bed-to-Chair Transfer  Assistance Needed: Supervision or touching assistance  Comment: supervision with 2ww and assist for O2 line and catheter management  CARE Score: 4  Discharge Goal: Independent  Toilet Transfer  Assistance needed: Supervision or touching assistance  Comment: CGA with 2WW and grab bars with additional assist on O2 line management  CARE Score: 4  Car Transfer  Assistance Needed: Supervision or touching assistance  Comment: SBA with max verbal cues and assist for line mangement  CARE Score: 4  Discharge Goal: Independent    Ambulation:    Walking Ability  Does the Patient Walk?: Yes     Walk 10 Feet  Assistance Needed: Supervision or touching assistance  Comment: SBA with 2ww with cues for O2 line awareness  CARE Score: 4  Discharge Goal: Independent     Walk 50 Feet with Two Turns  Assistance Needed: Supervision or touching assistance  Comment: SBA with 2ww with cues for O2 line awareness  CARE Score: 4  Discharge Goal: Independent     Walk 150 Feet  Assistance Needed: Supervision or touching assistance  Comment: SBA with 2ww with cues for O2 line awareness  CARE Score: 4  Discharge Goal: Independent     Walking 10 Feet on Uneven Surfaces  Assistance Needed: Supervision or touching assistance  Comment: CG with 2ww with cues for O2 line awareness  CARE Score: 4  Discharge Goal: Independent     1 Step (Curb)  Assistance Needed: Supervision or touching assistance  Comment: CGA using 2WW with additional assist on O2 line management  CARE Score: 4  Discharge Goal: Independent     4 Steps  Assistance Needed: Supervision or touching assistance  Comment: CGA with cues for foot placement at times, assist of O2 and catheter lines  CARE Score: 4  Discharge Goal: Independent     12 Steps  Assistance Needed: Supervision or touching assistance  Comment: as 4 steps  CARE Score: 4  Discharge Goal: Independent       Wheelchair:  w/c Ability: Wheelchair Ability  Uses a Wheelchair and/or Scooter?: No                Balance:        Object: Picking Up Object  Assistance Needed: Supervision or touching assistance  Comment: CGA with 2WW and reacher  CARE Score: 4  Discharge Goal: Independent    I      Exam:    Blood pressure (!) 147/55, pulse 60, temperature 98.4 °F (36.9 °C), temperature source Oral, resp. rate 16, height 5' 7.99\" (1.727 m), weight 242 lb 11.6 oz (110.1 kg), SpO2 97 %. General: Sitting up in a bedside chair eating lunch. Easily awakened from a nap. Alert and oriented x2 only. Engaging in conversation with his wife but he makes areas of recall. HEENT: MMM. Gazing right and left. Neck supple. Pulmonary: Shallow respirations with blunting in the bases. No wheezes or rales. Cardiac: Regular rate and rhythm. Abdomen: Patient's abdomen is soft and somewhat distended. Bowel sounds were present throughout. There was no rebound, guarding or masses noted.   No greater girth today than what I recall from Friday. Upper extremities: Able to bring both hands up to meet mine. Fair  strength. Poor dexterity. Give way with MMT. Lower extremities: Clumsy movements of both legs with giveaway with MMT. No signs of DVT. Trace edema. Sitting balance was fair. Standing balance was poor. Lab Results   Component Value Date    WBC 10.9 (H) 05/20/2022    HGB 13.0 (L) 05/20/2022    HCT 40.7 (L) 05/20/2022    .2 (H) 05/20/2022     05/20/2022     Lab Results   Component Value Date    INR 1.12 05/18/2022    INR 1.08 05/17/2022    INR 1.12 05/15/2022    PROTIME 14.4 05/18/2022    PROTIME 13.9 05/17/2022    PROTIME 14.4 05/15/2022     Lab Results   Component Value Date    CREATININE 1.6 (H) 05/20/2022    BUN 30 (H) 05/20/2022     (L) 05/20/2022    K 4.7 05/20/2022    CL 96 (L) 05/20/2022    CO2 32 05/20/2022     Lab Results   Component Value Date    ALT 35 05/20/2022    AST 41 (H) 05/20/2022    ALKPHOS 136 (H) 05/20/2022    BILITOT 0.6 05/20/2022       Expected length of stay  prior to a supervised level of function for discharge home with a walker and C OT/PT is 2-1/2 weeks. Recommendations:    1. Hepatic encephalopathy with gait disturbance: Although his insight is very poor, he has had better engagement in his therapy today. A little better physical tolerance for the daily occupational and physical therapy with speech-language pathology.     Planning to consult GI to manage the medical care of his liver disease so that he can be as alert as possible to retain new information from his training. [facility] needs adaptive equipment training, caregiver education and DVT prophylaxis.     Offering him a graduated schedule of activities to build tolerance.  Nutritional support.  Ongoing management by GI and the interventionalists.  Verbal cues and moderate to maximum physical assistance for transfers today.   2. DVT prophylaxis: Lovenox 40 mg subcu daily to resume after his procedure.  Weightbearing activities have been limited but are tried daily.  I must periodically monitor his hemoglobin and platelet count while on this medication.  GI prophylaxis is available.  No clinical signs of blood loss. 3. Acute respiratory failure with hypoxia: Overall may be breathing a little better following his thoracentesis last week. He is a little more animated today. Management of the liver disease that is causing the effusion buildup.  Bronchodilators periodically as needed.  Monitoring his oxygen levels at rest and with activity. 4. Acute on chronic kidney disease: Avoiding wide swings of blood pressure.  Avoiding nephrotoxic medications when possible.  Nephrology to monitor his recovery.  Periodic monitoring of his chemistries. 5. Alcoholic cirrhosis: Xifaxan and Chronulac.  Abstinence from alcohol.  Avoiding acetaminophen.  Diuretics for the ascites.  We may need GI to monitor his need for further paracentesis.

## 2022-05-23 NOTE — PLAN OF CARE
Problem: Safety - Adult  Goal: Free from fall injury  Outcome: Progressing     Problem: ABCDS Injury Assessment  Goal: Absence of physical injury  Outcome: Progressing     Problem: Skin/Tissue Integrity  Goal: Absence of new skin breakdown  Description: 1. Monitor for areas of redness and/or skin breakdown  2. Assess vascular access sites hourly  3. Every 4-6 hours minimum:  Change oxygen saturation probe site  4. Every 4-6 hours:  If on nasal continuous positive airway pressure, respiratory therapy assess nares and determine need for appliance change or resting period. Outcome: Progressing     Problem: Confusion  Goal: Confusion, delirium, dementia, or psychosis is improved or at baseline  Description: INTERVENTIONS:  1. Assess for possible contributors to thought disturbance, including medications, impaired vision or hearing, underlying metabolic abnormalities, dehydration, psychiatric diagnoses, and notify attending LIP  2. [place] high risk fall precautions, as indicated  3. Provide frequent short contacts to provide reality reorientation, refocusing and direction  4. Decrease environmental stimuli, including noise as appropriate  5. Monitor and intervene to maintain adequate nutrition, hydration, elimination, sleep and activity  6. If unable to ensure safety without constant attention obtain sitter and review sitter guidelines with assigned personnel  7.  Initiate Psychosocial CNS and Spiritual Care consult, as indicated  Outcome: Progressing

## 2022-05-23 NOTE — PROGRESS NOTES
Physical Therapy  . [x] daily progress note       [] discharge       Patient Name:  Rayray Hong   :  1945 MRN: [id number]  Room:  [address] [place], [place] [address] Date of Admission: 2022  Rehabilitation Diagnosis:   Hepatic failure, unspecified with coma [K72.91]  Hepatic encephalopathy ([facility] [address] [K72.90]       Date 2022       Day of ARU Week:  6   Time IN//935   Individual Tx Minutes 60   Group Tx Minutes    Co-Treat Minutes    Concurrent Tx Minutes    TOTAL Tx Time Mins 60   Variance Time    Variance Time []   Refusal due to:     []   Medical hold/reason:    []   Illness   []   Off Unit for test/procedure  []   Extra time needed to complete task  []   Therapeutic need  []   Other (specify):   Restrictions Restrictions/Precautions  Restrictions/Precautions: Fall Risk,General Precautions  Position Activity Restriction  Other position/activity restrictions: PINEDA, PICC line RUE, silva catheter, 2L O2   Interdisciplinary communication [x]   Cleared for therapy per nursing     []   RN notified about issues during session  [x]   RN updated on pt performance: pt at 97-98% O2 on 2L. Nursing oked decrease to 1L and stated he could be on room air during subsequent therapist to assess tolerance. []   Spoke with   [x]   Spoke with OT: re: O2 issues[]   Spoke with MD  []   Other:    Subjective observations and cognitive status: Pt sitting on EOB finishing breakfast upon entering. PT introduced self and pt appeared confused. Re-oriented pt to dx and admission to ARU. Improved communication with windowed mask.        Pain level/location:   0 /10       Location:    Discharge recommendations  Anticipated discharge date:tbd    Destination: []home alone   []home alone with assist PRN     [x] home w/ family      [] Continuous supervision  []SNF    [] Assisted living     [] Other:  Continued therapy: []HHC PT  []OUTPATIENT PT   [] No Further PT  []SNF PT  Caregiver training recommended: [x]Yes  [] No   Equipment needs: likely 2ww     PT IRF-VAMSI scores and goals for discharge assessment:   Roll Left and Right  Assistance Needed: Supervision or touching assistance  Comment: SBA-Sup without use of bed features; required additional assist on silva catherer management prior to rolling to R as silva catheter pre-positioned on LLE  CARE Score: 4  Discharge Goal: Independent    Sit to Lying  Assistance Needed: Supervision or touching assistance  Comment: SBA-Sup without use of bed features with additional assist on silva catheter management  CARE Score: 4  Discharge Goal: Independent    Lying to Sitting on Side of Bed  Assistance Needed: Supervision or touching assistance  Comment: SBA without use of bed features towards L side of bed where silva catheter is pre-positioned; reports mild lightneadedness upon sitting up but no trunk swaying observed  CARE Score: 4  Discharge Goal: Independent    Sit to Stand  Assistance Needed: Supervision or touching assistance  Comment: supervision  CARE Score: 4  Discharge Goal: Independent    Chair/Bed-to-Chair Transfer  Assistance Needed: Supervision or touching assistance  Comment: supervision with 2ww and assist for O2 line and catheter management  CARE Score: 4  Discharge Goal: Independent    Toilet Transfer  Assistance needed: Supervision or touching assistance  Comment: CGA c 2WW and grab bars. Pt also required verbal cues for O2 line management. CARE Score: 4  Discharge Goal: Independent    Car Transfer  Assistance Needed: Supervision or touching assistance  Comment: SBA with max verbal cues and assist for line mangement  CARE Score: 4  Discharge Goal: Independent    Walk 10 Feet?   Walk 10 Feet?: Yes    1 Step  1 Step?: Yes    Picking Up Object  Assistance Needed: Supervision or touching assistance  Comment: CGA with 2WW and reacher  CARE Score: 4  Discharge Goal: Independent    Wheelchair Ability  Uses a Wheelchair and/or Scooter?: No                        Walking Ability  Does the Patient Walk?: Yes    Walk 10 Feet  Assistance Needed: Supervision or touching assistance  Comment: SBA with 2ww with cues for O2 line awareness  CARE Score: 4  Discharge Goal: Independent    Walk 50 Feet with Two Turns  Assistance Needed: Supervision or touching assistance  Comment: SBA with 2ww with cues for O2 line awareness  CARE Score: 4  Discharge Goal: Independent    Walk 150 Feet  Assistance Needed: Supervision or touching assistance  Comment: SBA with 2ww with cues for O2 line awareness  CARE Score: 4  Discharge Goal: Independent    Walking 10 Feet on Uneven Surfaces  Assistance Needed: Supervision or touching assistance  Comment: CG with 2ww with cues for O2 line awareness  CARE Score: 4  Discharge Goal: Independent    1 Step (Curb)  Assistance Needed: Supervision or touching assistance  Comment: CGA using 2WW with additional assist on O2 line management  CARE Score: 4  Discharge Goal: Independent    4 Steps  Assistance Needed: Supervision or touching assistance  Comment: CGA with cues for foot placement at times, assist of O2 and catheter lines  CARE Score: 4  Discharge Goal: Independent    12 Steps  Assistance Needed: Supervision or touching assistance  Comment: as 4 steps  CARE Score: 4  Discharge Goal: Independent      Additional Therapeutic activities/exercises completed this date:     []   Nu-step:  Time:        Level:         #Steps:       []   Rebounder:    []  Seated     []  Standing        []   Balance training         []   Postural training    []   Supine ther ex (reps/sets):     []   Seated ther ex (reps/sets):     []   Standing ther ex (reps/sets):     []   Other:  Toileting activity completed with    []   Other:    Comments:      Patient/Caregiver Education and Training:   []   Role of PT  [x]   Education about Dx  [x]   Use of call light for assist   []   HEP provided and explained   [x]   Treatment plan reviewed  []   Home safety  []   Wheelchair mobility/management   []   Body mechanics  [x]   Bed Mobility/Transfer technique  [x]   Gait technique/sequencing  []   Proper use of assistive device/adaptive equipment  [x]   Stair training/Advanced mobility safety and technique  [x]   Reinforced patient's precautions/mobility while maintaining precautions  []   Postural awareness  []   Family/caregiver training  []   Progress was updated and reviewed in Rehabtracker with patient and/or family this date. []   Other:    Treatment Plan for Next Session: Trial 1L O2 during session and assess O2 sats, progression of skills to independence      Assessment: This pt demonstrated a positive response to today's treatment as evidenced by improved mobility, activity tolerance. The patient is making  progress toward established goals as evidenced by QI scores. Treatment/Activity Tolerance:   [] Tolerated treatment with no adverse effects    [x] Patient limited by fatigue  [] Patient limited by pain   [] Patient limited by medical complications:    [] Adverse reaction to Tx:   [] Significant change in status    Safety:       []  bed alarm set    [x]  chair alarm set    []  Pt refused alarms                []  Telesitter activated      [x]  Gait belt used during tx session      []other:       Number of Minutes/Billable Intervention  Gait Training 30   Therapeutic Exercise    Neuro Re-Ed    Therapeutic Activity 30   Wheelchair Propulsion    Group    Other:    TOTAL 60     Social History  Social/Functional History  Lives With: Spouse  Type of Home: House  Home Layout: One level  Home Access: Stairs to enter with rails  Entrance Stairs - Number of Steps: 4 CORAZON front of the home and 5 CORAZON at the back of the home. HRs c both sets of stairs.   Entrance Stairs - Rails: Both  Bathroom Shower/Tub: Walk-in shower  Bathroom Toilet: Standard  Bathroom Equipment:  (Per Pt's spouse, Pt has no equipment in the home.)  Bathroom Accessibility: Accessible  Has the patient had two or more falls in the past year or any fall with injury in the past year?: Yes (Pt reports 2 falls in the last year--fall when pulled by dog. Second fall on back porch due to LOB and stairs.)  ADL Assistance: Independent  Homemaking Assistance: Needs assistance  Homemaking Responsibilities: Yes  Meal Prep Responsibility: No (Wife completes.)  Laundry Responsibility: No (Wife completes.)  Cleaning Responsibility: No (Wife completes.)  Bill Paying/Finance Responsibility: No (Wife completes.)  Shopping Responsibility: No (Wife completes.)  Health Care Management: Primary (Pt manages medications c 7-day pillbox.)  Ambulation Assistance: Independent  Transfer Assistance: Independent  Active : Yes  Mode of Transportation: Car,Truck,Motorcycle  Education: 8th grade; some difficulty in school  Occupation: Retired  Type of Occupation:   Leisure & Hobbies: Likes to work on cars, cats and a [place] monika, wife does grocery shopping, out to eat. Pt manages medications c 7-day pillbox. Wife manages bills. Pt manages yardwork  IADL Comments: Likes to surf the internet  Additional Comments: Pt reports 2 falls in the last year--fall when pulled by dog. Second fall on back porch due to LOB and stairs. Objective                                                                                    Goals:  (Update in navigator)   : Long Term Goals  Time Frame for Long term goals : 7-10 tx days:  Long term goal 1: Pt will complete bed mobility (scooting, rolling R/L, and sup<->sit) Ind.  Long term goal 2: Pt will complete OOB transfers Mod Ind-Ind. Long term goal 3: Pt will ambulate >/=150 ft over level surface and at least 10 ft of uneven surface using 2WW Mod Ind. Long term goal 4: Pt will ascend/descend curb step using 2WW and 1 flight of stairs using railings Mod Ind.   Long term goal 5: Pt will complete object retrieval from the floor with 2WW and reacher Mod Ind.:        Plan of Care                                                                              Times per week: 5 days per week for a minimum of 60 minutes/day plus group as appropriate for 60 minutes.   Treatment to include Current Treatment Recommendations: Strengthening,Balance training,Functional mobility training,Transfer training,Cognitive/Perceptual training,Endurance training,Gait training,Stair training,Cognitive reorientation,Home exercise program,Safety education & training,Patient/Caregiver education & training,Equipment evaluation, education, & procurement,Therapeutic activities    Electronically signed by   Roverto Thomas PT,   5/23/2022, 10:55 AM

## 2022-05-23 NOTE — PATIENT CARE CONFERENCE
ACUTE REHAB TEAM CONFERENCE SUMMARY   [address] [facility]    NAME: Luke Givens  : 1945 ADMIT DATE: 2022    Rehab Admitting Dx:Hepatic failure, unspecified with coma [K72.91]  Hepatic encephalopathy ([address] [K72.90]  Patient Comorbid Conditions: Active Hospital Problems    Diagnosis Date Noted    Generalized weakness [R53.1]      Priority: Medium    Oropharyngeal dysphagia [R13.12]      Priority: Medium    Acute kidney injury superimposed on chronic kidney disease ([address] [N17.9, N18.9]      Priority: Medium    Hepatic encephalopathy ([address] [K72.90] 2022    Gastroesophageal reflux disease [K21.9] 2019     Date: 2022    CASE MANAGEMENT  Current issues/needs regarding patient and family discharge status:   Patient will dc 1-level home with spouse. [address]. Indep & drive PTA. CPAP PTA. No continuous o2. PHYSICAL THERAPY (Updated in QI)        Long Term Goals  Time Frame for Long term goals : 7-10 tx days:  Long term goal 1: Pt will complete bed mobility (scooting, rolling R/L, and sup<->sit) Ind.  Long term goal 2: Pt will complete OOB transfers Mod Ind-Ind. Long term goal 3: Pt will ambulate >/=150 ft over level surface and at least 10 ft of uneven surface using 2WW Mod Ind. Long term goal 4: Pt will ascend/descend curb step using 2WW and 1 flight of stairs using railings Mod Ind. Long term goal 5: Pt will complete object retrieval from the floor with 2WW and reacher Mod Ind. Impairments/deficits, barriers:     Body Structures, Functions, Activity Limitations Requiring Skilled Therapeutic Intervention: Decreased functional mobility ,Decreased safe awareness,Decreased cognition,Decreased endurance,Decreased balance,Decreased high-level IADLs     Therapy Prognosis: Good  Decision Making: High Complexity  Clinical Presentation: unstable/unpredictable characteristics  Equipment needed at discharge: 2WW       PT IRF-VAMSI scores since initial assessment  Bed Mobility:   Sit to Lying  Assistance Needed: Supervision or touching assistance  Comment: SBA-Sup (required assist on silva catheter mannagement)  CARE Score: 4  Discharge Goal: Independent    Roll Left and Right  Assistance Needed: Supervision or touching assistance  Comment: SBA-Sup without use of bed features; required additional assist on silva catherer management prior to rolling to R as silva catheter pre-positioned on LLE  CARE Score: 4  Discharge Goal: Independent    Lying to Sitting on Side of Bed  Assistance Needed: Supervision or touching assistance  Comment: SBA without use of bed features towards L side of bed where silva catheter is pre-positioned; reports mild lightneadedness upon sitting up but no trunk swaying observed  CARE Score: 4  Discharge Goal: Independent    Transfers:    Sit to Stand  Assistance Needed: Setup or clean-up assistance  Comment: only required positioning of silva catheter line/bag prior to standing; consistent with pushing of BUE to be able to stand; eventually also required set-up assist of O2 line prior to standing  CARE Score: 5  Discharge Goal: Independent    Chair/Bed-to-Chair Transfer  Assistance Needed: Supervision or touching assistance  Comment: SBA-Sup with 2WW; required assist with O2 line management on latter half of this tx session due to need for supplemental O2  CARE Score: 4  Discharge Goal: Independent    Toilet Transfer  Assistance needed: Supervision or touching assistance  Comment: SBA-Sup with 2WW and grab bars  CARE Score: 4    Car Transfer  Assistance Needed: Supervision or touching assistance  Comment: SBA with max verbal cues and assist for line mangement  CARE Score: 4  Discharge Goal: Independent    Ambulation:    Walking Ability  Does the Patient Walk?: Yes     Walk 10 Feet  Assistance Needed: Supervision or touching assistance  Comment: set-up assist or silva catheter line/bag only prior to ambulation but eventually required use of supplemental O2 as this tx session progressed requiring assist on safe management of it througout this mobility task; gait quality was steady throughout  CARE Score: 4  Discharge Goal: Independent     Walk 50 Feet with Two Turns  Assistance Needed: Supervision or touching assistance  Comment: set-up assist or silva catheter line/bag only prior to ambulation but eventually required use of supplemental O2 as this tx session progressed requiring assist on safe management of it througout this mobility task; gait quality was steady throughout  CARE Score: 4  Discharge Goal: Independent     Walk 150 Feet  Assistance Needed: Supervision or touching assistance  Comment: set-up assist or silva catheter line/bag only prior to ambulation but eventually required use of supplemental O2 as this tx session progressed requiring assist on safe management of it througout this mobility task; gait quality was steady throughout  CARE Score: 4  Discharge Goal: Independent     Walking 10 Feet on Uneven Surfaces  Assistance Needed: Supervision or touching assistance  Comment: SBA-Sup using 2WW with VCs on O2 line awareness  CARE Score: 4  Discharge Goal: Independent     1 Step (Curb)  Assistance Needed: Supervision or touching assistance  Comment: CGA using 2WW with additional assist on O2 line management  CARE Score: 4  Discharge Goal: Independent     4 Steps  Assistance Needed: Supervision or touching assistance  Comment: CGA with cues for foot placement at times, assist of O2 and catheter lines  CARE Score: 4  Discharge Goal: Independent     12 Steps  Assistance Needed: Supervision or touching assistance  Comment: as 4 steps  CARE Score: 4  Discharge Goal: Independent           Wheelchair:  w/c Ability: Wheelchair Ability  Uses a Wheelchair and/or Scooter?: No                        Balance:        Object: Picking Up Object  Assistance Needed: Supervision or touching assistance  Comment: CGA with 2WW and reacher  CARE Score: 4  Discharge Goal: Independent    Fall Risk: []  Yes  []  No    OCCUPATIONAL THERAPY  (Updated in QI)  Short Term Goals  Time Frame for Short term goals: STGs=LTGs :   Long Term Goals  Time Frame for Long term goals : 7-10 days or until d/c. Long Term Goal 1: Pt will self-feed c Ind. Long Term Goal 2: Pt will complete grooming tasks c Mod I.  Long Term Goal 3: Pt will complete total body bathing c AE PRN and Mod I.  Long Term Goal 4: Pt will complete UB dressing c Ind. Long Term Goal 5: Pt will complete LB dressing c AE PRN and Mod I. Additional Goals?: Yes  Long Term Goal 6: Pt will doff/don footwear c AE PRN and Mod I.  Long Term Goal 7: Pt will complete toileting c Mod I.  Long Term Goal 8: Pt will perform functional transfers (bed, chair, toilet, shower) c DME PRN and Mod I.  Long Term Goal 9: Pt will perform therex/therax to facilitate an increase in strength/endurance/ax tolerance (c emphasis on dynamic standing balance/tolerance > 8 mins) c Mod I.  Long Term Goal 10: Pt will complete oral hygiene c Mod I. :                                       OT IRF-VAMSI scores and goals since initial assessment:    ADLs:    Eating: Eating  Assistance Needed: Setup or clean-up assistance  Comment: Pt reports he has had some difficulty opening packages/containers. CARE Score: 5  Discharge Goal: Independent       Oral Hygiene: Oral Hygiene  Assistance Needed: Supervision or touching assistance  Comment: Pt required verbal cues for sequencing as Pt tried to squeeze toothpaste onto brush before removing cap. CARE Score: 4  Discharge Goal: Independent    UB/LB Bathing: Shower/Bathe Self  Assistance Needed: Supervision or touching assistance  Comment: Pt required Max verbal cues for sequencing UB/LB bathing. Pt able to complete sink side washing up c CG/SBA. Pt seated for majority.   CARE Score: 4  Discharge Goal: Independent    UB Dressing: Upper Body Dressing  Assistance Needed: Supervision or touching assistance  Comment: Pt required cues to manage O2 line when donning/doffing pullover tshirt. CARE Score: 4  Discharge Goal: Independent         LB Dressing: Lower Body Dressing  Assistance Needed: Partial/moderate assistance  Comment: Pt required Min A d/t needing assist c silva cath into depends and pants. CARE Score: 3  Discharge Goal: Independent    Donning and [place] Footwear: Putting On/Taking Off Footwear  Assistance Needed: Supervision or touching assistance  Comment: Supervision to doff/don socks using figure four method. CARE Score: 4  Discharge Goal: Independent      Toiletin [address] needed: Supervision or touching assistance  Comment: SBA in stance to manage depends and shorts. CARE Score: 4  Discharge Goal: Independent      Toilet Transfers: Toilet Transfer  Assistance needed: Supervision or touching assistance  Comment: SBA-Sup with 2WW and grab bars  CARE Score: 4  Discharge Goal: Independent      Impairments/deficits, barriers:  Functional balance, cognition, endurance, and ADLs. Assessment  Performance deficits / Impairments: Decreased functional mobility ,Decreased strength,Decreased endurance,Decreased coordination,Decreased ADL status,Decreased safe awareness,Decreased high-level IADLs,Decreased cognition,Decreased balance,Decreased fine motor control  Decision Making: Medium Complexity  Equipment needed at discharge:Shower chair. COGNITIVE FUNCTION/SPEECH THERAPY (AS INDICATED)  LTG         Duration/Frequency of Treatment  Duration of Treatment: 3x/week x2 weeks 30 mins min                     Short-term Goals  Timeframe for Short-term Goals: 3x/week x2 weeks 30 mins min  LTG: Pt will use strategies to improve comprehension of daily tasks for improved overall safety. Pt will improve cognition and memory for safe return home with spouse with minimal cues.   Goal 1: Complete cog assessment by 22  Goal met 22  BCAT   Goal 2: Pt will improve multistep sequencing in fx improving from max to mod to min cues. Max to mod cues. Improves with visual demonstration and repeat back. Goal 3: Pt will improve recall of learned safety both verbally and fx with min cues  Mod cues for carryover-verbal and tactile cues  Goal 4: Pt will attend to tasks with good eye contact and request assist when not understanding with min to no cues for 15 mins  Max cues--pt averts gaze  Goal 5: Pt will improve verbal and fx problem solving with min cues. (ncluding med mgmt for discharge planning needs)   Med mgmt--max assist, increased overall confusion throughout the day. Problem solving requires max to mod cues for multistep tasks                      Ongoing impairments or deficits or barriers: cognition and memory  Areas where progress has been made:   Strategies that improve performance:verbal, visual and tactile cues, repetition    Nursing Current Medical Status:   [] Is continent of bowel and bladder     [] Is incontinent of bowel and bladder    [] Has had an adequate number of bowel movements   [] Urinates with no urinary retention >300ml in bladder   [] Targeting bladder protocol with silva removal   [x] Maintaining O2 SATs at 92% or greater desats without O2   [] Has pain managed while on ARU         [] Has had no skin breakdown while on ARU   [] Has improved skin integrity via wound measurements   [] Has no signs/symptoms of infection at the wound site   [] Pressure wounds Stage/Location:    [] Arrived on unit with pressure wound  [] Has been free from injury during hospitalization   [] Has experienced a fall during hospitalization  [] Ongoing education with patient/family with understanding demonstrated for:  [] Receives IV Fluids  [x] Other: confusion        NUTRITION  Weight: 243 lb 13.3 oz (110.6 kg) / Body mass index is 37.08 kg/m². Current diet: ADULT DIET;  Regular  ADULT ORAL NUTRITION SUPPLEMENT; Lunch, Dinner; Frozen Oral Supplement  Intake: Meal intake %, will consume some frozen oral nutrition discharge date has been changed from initial team conference due to:   []  The estimated discharge destination has been changed from initial team conference due to:         Ongoing tx following discharge: [x]HHC PT OT NSG      []OUTPATIENT     [] No Further Treatment     [x] Family/Caregiver Training  []  Transitional Living Arrangement   [] Home Assessment (date  )     [] Family Conference   []  Therapeutic Pass       []  Other: (specify)    Estimated Discharge Date: 5/27/22    Estimated Discharge Destination: []home alone   []home alone with assist prn  []Continuous supervision [x]Return home with s/o/spouse/family   [] Assisted living    []SNF     Team members participating in today's conference. [x] Darrius Segovia, Medical Director  [x] Jossie Najera,       [] Lawrence Cintron, RN Nurse Supervisor     [x]  Norbert Hernandez, PT  []  Stella Sabillon, PT       [] Tabitha Caraballo, OT   [x] Andressa Hinkle, OT  [] Miles Heart, OT     [x]  Dl Caldera, SLP    []  Kendell Max, SLP   [] Latesha Kaiser, MARISELA      [] Isis Jimenez Mgr   [x]Lainey Huddleston      [] Peace Alexander RN   [x] Jackson Grewal RN    [] Concetta Treviño RN    [] Lluvia Ordaz RN    [] Juliette Santos RN   [] Inga Lambert, JANNETH   [] Rosendo Ribeiro RN Nurse Mgr     I have led this Team Conference and agree with the plan, Darrius Segovia MD, 5/24/2022, 12:34 PM  Goals have been updated to reflect recent status.     Team conference note transcribed this date by: Alicia Wise MA, [id number] [facility], [place], operated [facility], Therapy Coordinator

## 2022-05-24 LAB
ALBUMIN SERPL-MCNC: 2.96 G/DL (ref 3.75–5.01)
ALPHA-1-GLOBULIN: 0.35 G/DL (ref 0.19–0.46)
ALPHA-2-GLOBULIN: 0.74 G/DL (ref 0.48–1.05)
BETA GLOBULIN: 1.1 G/DL (ref 0.48–1.1)
GAMMA: 2.15 G/DL (ref 0.62–1.51)
GLUCOSE BLD-MCNC: 100 MG/DL (ref 70–99)
GLUCOSE BLD-MCNC: 109 MG/DL (ref 70–99)
GLUCOSE BLD-MCNC: 127 MG/DL (ref 70–99)
GLUCOSE BLD-MCNC: 146 MG/DL (ref 70–99)
GLUCOSE BLD-MCNC: 98 MG/DL (ref 70–99)
GLUCOSE BLD-MCNC: 99 MG/DL (ref 70–99)
IMMUNOFIXATION REFLEX: ABNORMAL
PROTEIN ELECTROPHORESIS, SERUM: ABNORMAL
SPE/IFE INTERPRETATION: ABNORMAL
TOTAL PROTEIN: 7.3 G/DL (ref 6.3–8.2)

## 2022-05-24 PROCEDURE — 97129 THER IVNTJ 1ST 15 MIN: CPT

## 2022-05-24 PROCEDURE — 97530 THERAPEUTIC ACTIVITIES: CPT

## 2022-05-24 PROCEDURE — 51798 US URINE CAPACITY MEASURE: CPT

## 2022-05-24 PROCEDURE — 97110 THERAPEUTIC EXERCISES: CPT

## 2022-05-24 PROCEDURE — 6370000000 HC RX 637 (ALT 250 FOR IP): Performed by: HOSPITALIST

## 2022-05-24 PROCEDURE — 6360000002 HC RX W HCPCS: Performed by: HOSPITALIST

## 2022-05-24 PROCEDURE — 94761 N-INVAS EAR/PLS OXIMETRY MLT: CPT

## 2022-05-24 PROCEDURE — 94150 VITAL CAPACITY TEST: CPT

## 2022-05-24 PROCEDURE — 99233 SBSQ HOSP IP/OBS HIGH 50: CPT | Performed by: PHYSICAL MEDICINE & REHABILITATION

## 2022-05-24 PROCEDURE — 97116 GAIT TRAINING THERAPY: CPT

## 2022-05-24 PROCEDURE — 97130 THER IVNTJ EA ADDL 15 MIN: CPT

## 2022-05-24 PROCEDURE — 82962 GLUCOSE BLOOD TEST: CPT

## 2022-05-24 PROCEDURE — 1280000000 HC REHAB R&B

## 2022-05-24 PROCEDURE — 2700000000 HC OXYGEN THERAPY PER DAY

## 2022-05-24 RX ADMIN — MAGNESIUM OXIDE 400 MG (241.3 MG MAGNESIUM) TABLET 200 MG: TABLET at 08:51

## 2022-05-24 RX ADMIN — FUROSEMIDE 40 MG: 40 TABLET ORAL at 08:51

## 2022-05-24 RX ADMIN — ENOXAPARIN SODIUM 40 MG: 100 INJECTION SUBCUTANEOUS at 08:51

## 2022-05-24 RX ADMIN — RIFAXIMIN 550 MG: 550 TABLET ORAL at 22:50

## 2022-05-24 RX ADMIN — LACTULOSE 20 G: 10 SOLUTION ORAL at 22:55

## 2022-05-24 RX ADMIN — HYDROCORTISONE ACETATE 25 MG: 25 SUPPOSITORY RECTAL at 23:00

## 2022-05-24 RX ADMIN — DULOXETINE HYDROCHLORIDE 60 MG: 30 CAPSULE, DELAYED RELEASE ORAL at 22:49

## 2022-05-24 RX ADMIN — LEVOTHYROXINE SODIUM 50 MCG: 0.05 TABLET ORAL at 06:13

## 2022-05-24 RX ADMIN — RIFAXIMIN 550 MG: 550 TABLET ORAL at 08:51

## 2022-05-24 RX ADMIN — PANTOPRAZOLE SODIUM 40 MG: 40 TABLET, DELAYED RELEASE ORAL at 06:13

## 2022-05-24 RX ADMIN — SPIRONOLACTONE 100 MG: 50 TABLET ORAL at 08:51

## 2022-05-24 RX ADMIN — LACTULOSE 20 G: 10 SOLUTION ORAL at 06:13

## 2022-05-24 RX ADMIN — MAGNESIUM OXIDE 400 MG (241.3 MG MAGNESIUM) TABLET 200 MG: TABLET at 22:50

## 2022-05-24 RX ADMIN — NADOLOL 40 MG: 20 TABLET ORAL at 08:51

## 2022-05-24 ASSESSMENT — PAIN SCALES - GENERAL
PAINLEVEL_OUTOF10: 0
PAINLEVEL_OUTOF10: 0

## 2022-05-24 ASSESSMENT — PAIN SCALES - WONG BAKER: WONGBAKER_NUMERICALRESPONSE: 0

## 2022-05-24 ASSESSMENT — PAIN DESCRIPTION - FREQUENCY: FREQUENCY: CONTINUOUS

## 2022-05-24 ASSESSMENT — PAIN DESCRIPTION - PAIN TYPE: TYPE: CHRONIC PAIN

## 2022-05-24 ASSESSMENT — PAIN DESCRIPTION - LOCATION: LOCATION: BACK

## 2022-05-24 ASSESSMENT — PAIN DESCRIPTION - DESCRIPTORS: DESCRIPTORS: ACHING

## 2022-05-24 NOTE — PROGRESS NOTES
Brianne Mohr    : 1945  Acct #: [de-identified]  MRN: [id number]              PM&R Progress Note      Admitting diagnosis: Hepatic encephalopathy ( [address])     Comorbid diagnoses impacting rehabilitation: Generalized weakness, gait disturbance, dysphagia, acute respiratory failure with hypoxia, pleural effusions, alcoholic cirrhosis, acute on chronic kidney disease, hypothyroidism, obesity (BMI 37.3)    Chief complaint: He denied any particular pain today. Fatigued after activities. His wife felt he was a bit more alert today. Prior (baseline) level of function: Independent. Current level of function:         Current  IRF-VAMSI and Goals:   Occupational Therapy:    Short Term Goals  Time Frame for Short term goals: STGs=LTGs :   Long Term Goals  Time Frame for Long term goals : 7-10 days or until d/c. Long Term Goal 1: Pt will self-feed c Ind. Long Term Goal 2: Pt will complete grooming tasks c Mod I.  Long Term Goal 3: Pt will complete total body bathing c AE PRN and Mod I.  Long Term Goal 4: Pt will complete UB dressing c Ind. Long Term Goal 5: Pt will complete LB dressing c AE PRN and Mod I. Additional Goals?: Yes  Long Term Goal 6: Pt will doff/don footwear c AE PRN and Mod I.  Long Term Goal 7: Pt will complete toileting c Mod I.  Long Term Goal 8: Pt will perform functional transfers (bed, chair, toilet, shower) c DME PRN and Mod I.  Long Term Goal 9: Pt will perform therex/therax to facilitate an increase in strength/endurance/ax tolerance (c emphasis on dynamic standing balance/tolerance > 8 mins) c Mod I.  Long Term Goal 10: Pt will complete oral hygiene c Mod I. :                                       Eating: Eating  Assistance Needed: Setup or clean-up assistance  Comment: Pt reports he has had some difficulty opening packages/containers.   CARE Score: 5  Discharge Goal: Independent       Oral Hygiene: Oral Hygiene  Assistance Needed: Supervision or touching assistance  Comment: Pt required verbal cues for sequencing as Pt tried to squeeze toothpaste onto brush before removing cap. CARE Score: 4  Discharge Goal: Independent    UB/LB Bathing: Shower/Bathe Self  Assistance Needed: Supervision or touching assistance  Comment: Pt required Max verbal cues for sequencing UB/LB bathing. Pt able to complete sink side washing up c CG/SBA. Pt seated for majority. CARE Score: 4  Discharge Goal: Independent    UB Dressing: Upper Body Dressing  Assistance Needed: Supervision or touching assistance  Comment: Pt required cues to manage O2 line when donning/doffing pullover tshirt. CARE Score: 4  Discharge Goal: Independent         LB Dressing: Lower Body Dressing  Assistance Needed: Partial/moderate assistance  Comment: Pt required Min A d/t needing assist c silva cath into depends and pants. CARE Score: 3  Discharge Goal: Independent    Donning and [place] Footwear: Putting On/Taking Off Footwear  Assistance Needed: Supervision or touching assistance  Comment: Supervision to doff/don socks using figure four method. CARE Score: 4  Discharge Goal: Independent      Toiletin [address] needed: Supervision or touching assistance  Comment: SBA in stance to manage depends and shorts. CARE Score: 4  Discharge Goal: Independent      Toilet Transfers: Toilet Transfer  Assistance needed: Supervision or touching assistance  Comment: SBA-Sup with 2WW and grab bars  CARE Score: 4  Discharge Goal: Independent    Physical Therapy:         Long Term Goals  Time Frame for Long term goals : 7-10 tx days:  Long term goal 1: Pt will complete bed mobility (scooting, rolling R/L, and sup<->sit) Ind.  Long term goal 2: Pt will complete OOB transfers Mod Ind-Ind. Long term goal 3: Pt will ambulate >/=150 ft over level surface and at least 10 ft of uneven surface using 2WW Mod Ind. Long term goal 4: Pt will ascend/descend curb step using 2WW and 1 flight of stairs using railings Mod Ind.   Long term goal 5: Pt will complete object retrieval from the floor with 2WW and reacher Mod Ind.       Bed Mobility:   Sit to Lying  Assistance Needed: Supervision or touching assistance  Comment: SBA-Sup (required assist on silva catheter mannagement)  CARE Score: 4  Discharge Goal: Independent  Roll Left and Right  Assistance Needed: Supervision or touching assistance  Comment: SBA-Sup without use of bed features; required additional assist on silva catherer management prior to rolling to R as silva catheter pre-positioned on LLE  CARE Score: 4  Discharge Goal: Independent  Lying to Sitting on Side of Bed  Assistance Needed: Supervision or touching assistance  Comment: SBA without use of bed features towards L side of bed where silva catheter is pre-positioned; reports mild lightneadedness upon sitting up but no trunk swaying observed  CARE Score: 4  Discharge Goal: Independent    Transfers:    Sit to Stand  Assistance Needed: Setup or clean-up assistance  Comment: only required positioning of silva catheter line/bag prior to standing; consistent with pushing of BUE to be able to stand; eventually also required set-up assist of O2 line prior to standing  CARE Score: 5  Discharge Goal: Independent  Chair/Bed-to-Chair Transfer  Assistance Needed: Supervision or touching assistance  Comment: SBA-Sup with 2WW; required assist with O2 line management on latter half of this tx session due to need for supplemental O2  CARE Score: 4  Discharge Goal: Independent  Toilet Transfer  Assistance needed: Supervision or touching assistance  Comment: SBA-Sup with 2WW and grab bars  CARE Score: 4  Car Transfer  Assistance Needed: Supervision or touching assistance  Comment: SBA with max verbal cues and assist for line mangement  CARE Score: 4  Discharge Goal: Independent    Ambulation:    Walking Ability  Does the Patient Walk?: Yes     Walk 10 Feet  Assistance Needed: Supervision or touching assistance  Comment: set-up assist or silva catheter line/bag only prior to ambulation but eventually required use of supplemental O2 as this tx session progressed requiring assist on safe management of it througout this mobility task; gait quality was steady throughout  CARE Score: 4  Discharge Goal: Independent     Walk 50 Feet with Two Turns  Assistance Needed: Supervision or touching assistance  Comment: set-up assist or silva catheter line/bag only prior to ambulation but eventually required use of supplemental O2 as this tx session progressed requiring assist on safe management of it througout this mobility task; gait quality was steady throughout  CARE Score: 4  Discharge Goal: Independent     Walk 150 Feet  Assistance Needed: Supervision or touching assistance  Comment: set-up assist or silva catheter line/bag only prior to ambulation but eventually required use of supplemental O2 as this tx session progressed requiring assist on safe management of it througout this mobility task; gait quality was steady throughout  CARE Score: 4  Discharge Goal: Independent     Walking 10 Feet on Uneven Surfaces  Assistance Needed: Supervision or touching assistance  Comment: SBA-Sup using 2WW with VCs on O2 line awareness  CARE Score: 4  Discharge Goal: Independent     1 Step (Curb)  Assistance Needed: Supervision or touching assistance  Comment: CGA using 2WW with additional assist on O2 line management  CARE Score: 4  Discharge Goal: Independent     4 Steps  Assistance Needed: Supervision or touching assistance  Comment: CGA with cues for foot placement at times, assist of O2 and catheter lines  CARE Score: 4  Discharge Goal: Independent     12 Steps  Assistance Needed: Supervision or touching assistance  Comment: as 4 steps  CARE Score: 4  Discharge Goal: Independent       Wheelchair:  w/c Ability: Wheelchair Ability  Uses a Wheelchair and/or Scooter?: No                Balance:        Object: Picking Up Object  Assistance Needed: Supervision or touching assistance  Comment: CGA with 2WW and reacher  CARE Score: 4  Discharge Goal: Independent    I      Exam:    Blood pressure (!) 138/59, pulse 62, temperature 97.6 °F (36.4 °C), temperature source Oral, resp. rate 16, height 5' 7.99\" (1.727 m), weight 243 lb 13.3 oz (110.6 kg), SpO2 99 %. General: Recumbent in bed. Oxygen per nasal cannula. Alert. Oriented only x2. HEENT: Gazing right and left. No nystagmus. Speech is soft but intelligible. No JVD. Pulmonary: Respirations were a bit blunted in the bases and restricted by his girth. Cardiac: Distant heart sounds with regular rate and rhythm. Abdomen: Patient's abdomen is soft and nondistended. Bowel sounds were present throughout. There was no rebound, guarding or masses noted. Upper extremities: Slow and deliberate with arm movements were clumsy  and dexterity function. Lower extremities: Calf soft. Heels clear. Trace edema. No signs of DVT. Sitting balance was fair. Standing balance was fair-.    Lab Results   Component Value Date    WBC 9.5 05/23/2022    HGB 13.3 (L) 05/23/2022    HCT 41.1 (L) 05/23/2022    MCV 99.0 05/23/2022     05/23/2022     Lab Results   Component Value Date    INR 1.12 05/18/2022    INR 1.08 05/17/2022    INR 1.12 05/15/2022    PROTIME 14.4 05/18/2022    PROTIME 13.9 05/17/2022    PROTIME 14.4 05/15/2022     Lab Results   Component Value Date    CREATININE 1.5 (H) 05/23/2022    BUN 31 (H) 05/23/2022     05/23/2022    K 5.1 05/23/2022    CL 98 (L) 05/23/2022    CO2 30 05/23/2022     Lab Results   Component Value Date    ALT 32 05/23/2022    AST 41 (H) 05/23/2022    ALKPHOS 128 05/23/2022    BILITOT 0.6 05/23/2022       Expected length of stay  prior to a supervised level of function for discharge home with a walker and [facility] AT [facility]/PT is 5/27/2022. Recommendations:    1. Hepatic encephalopathy with gait disturbance: A little more purposeful during my exam today.   Still struggles with poor endurance during the daily occupational and physical therapy with speech-language pathology.  His ammonia level was normal yesterday. Perhaps his confusion yesterday was the waxing and waning mentation of someone with hepatic encephalopathy and poor sleep the night before. White count was normal.  Electrolytes within normal limits. Urinalysis was quite unremarkable. Ongoing adaptive equipment training, caregiver education and DVT prophylaxis.     Focusing on caregiver training to get him home with his wife this week. Verbal cues and  minimum physical assistance for transfers today. 2. DVT prophylaxis: Lovenox 40 mg subcu daily to resume after his procedure.  Weightbearing activities have been  slowly improving daily. Recent hemoglobin was improving.  GI prophylaxis is available.  No new bruising or swelling. 3. Acute respiratory failure with hypoxia:  Less coughing and no dyspnea today. General fatigue, however. Management of the liver disease that is causing the effusion buildup.  Bronchodilators periodically as needed.  Monitoring his oxygen levels at rest and with activity. 4. Acute on chronic kidney disease: Avoiding wide swings of blood pressure.  Avoiding nephrotoxic medications when possible.  Nephrology to monitor his recovery.  Periodic monitoring of his chemistries. Voiding trial in a.m.  5. Alcoholic cirrhosis: Xifaxan and Chronulac.  Abstinence from alcohol.  Avoiding acetaminophen.  Diuretics for the ascites.  GI to manage as an outpatient.        Counseling and Coordination of Care: In care conference today I met with the patient's OT, PT, RN and . We discussed the patient's problems, progress and prognosis. Disposition issues were clarified and plans were established for ongoing rehabilitation efforts beyond the ARU stay. I reviewed this information with the patient during a second distinct visit with the patient.  More than half of the total time of 37 minutes spent with the patient involved counseling and coordination of care.

## 2022-05-24 NOTE — PROGRESS NOTES
[facility] UNIT  SPEECH/LANGUAGE PATHOLOGY      [x] Daily           [] Discharge    Patient:Boby Wasserman      UEY:[birth year]  A:[id number]  Rehab Dx/Hx: Hepatic failure, unspecified with coma [K72.91]  Hepatic encephalopathy ([address] [K72.90]   Allergies   Allergen Reactions    Lisinopril Swelling     Tongue swelling      Zetia [Ezetimibe] Swelling     Facial swelling    Atorvastatin     Codeine Other (See Comments)     Blood pressure drops    Hydrochlorothiazide     Hydroxyzine      Fatigue and depressed    Lexapro [Escitalopram Oxalate]      Increased depression    Pravastatin      Precautions: ; Restrictions/Precautions: Fall Risk,General Precautions          Home Situation/IADL:   Social/Functional History  Lives With: Spouse  Type of Home: House  Home Layout: One level  Home Access: Stairs to enter with rails  Entrance Stairs - Number of Steps: 4 CORAZON front of the home and 5 CORAZON at the back of the home. HRs c both sets of stairs. Entrance Stairs - Rails: Both  Bathroom Shower/Tub: Walk-in shower  Bathroom Toilet: Standard  Bathroom Equipment:  (Per Pt's spouse, Pt has no equipment in the home.)  Bathroom Accessibility: Accessible  Has the patient had two or more falls in the past year or any fall with injury in the past year?: Yes (Pt reports 2 falls in the last year--fall when pulled by dog.  Second fall on back porch due to LOB and stairs.)  ADL Assistance: Independent  Homemaking Assistance: Needs assistance  Homemaking Responsibilities: Yes  Meal Prep Responsibility: No (Wife completes.)  Laundry Responsibility: No (Wife completes.)  Cleaning Responsibility: No (Wife completes.)  Bill Paying/Finance Responsibility: No (Wife completes.)  Shopping Responsibility: No (Wife completes.)  Health Care Management: Primary (Pt manages medications c 7-day pillbox.)  Ambulation Assistance: Independent  Transfer Assistance: Independent  Active : Yes  Mode of Transportation: Car,Truck,Motorcycle  Education: 8th grade; some difficulty in school  Occupation: Retired  Type of Occupation:   Leisure & Hobbies: Likes to work on cars, cats and a [place] mon[place], wife does grocery shopping, out to eat. Pt manages medications c 7-day pillbox. Wife manages bills. Pt manages yardwork  IADL Comments: Likes to surf the internet  Additional Comments: Pt reports 2 falls in the last year--fall when pulled by dog. Second fall on back porch due to LOB and stairs. Date of Admit: 5/18/2022  Room #: 1006/1006-A     ST Number of Minutes/Billable Intervention  Cog/Memory Deficits 35    Aphasia/Language     Dysarthria/Speech     Apraxia/Speech     Dysphagia/Swallowing     Group     Other    TOTAL Minutes Billed  34    Variance    +5 MUMs      Date: 5/24/2022  Day of ARU Week:  7       [place] in: Time In: 7436  [place] out-Time Out: 1117  [place] total-Minutes: 35     Variance/Reason:  [] Refusal due to   [] Medical hold/reason  [] Illness   [] Off Unit for test/procedure  [] Extra time needed to complete task  [] Other (specify)    Activity completed: Pt seen for cotreat with PT. Targeted sequencing via repetition for O2 line mgmt, transfers with turns to chair, and managing rugs. See PT notes for details regarding mobility    Pain: back pain mid session  Current Diet: ADULT DIET; Regular  ADULT ORAL NUTRITION SUPPLEMENT; Lunch, Dinner; Frozen Oral Supplement  Subjective: O2 via nasal cannula 3L, alert, limited responses, very [place]. Improved with visual and tactile cues      Goals and POC: Co-treats where appropriate with PT or OT to facilitate patient goals in functional tasks. LTG                           Short-term Goals  Timeframe for Short-term Goals: 3x/week x2 weeks 30 mins min  LTG: Pt will use strategies to improve comprehension of daily tasks for improved overall safety. Pt will improve cognition and memory for safe return home with spouse with minimal cues.   Goal 1: Complete cog assessment by 5/23/22  Goal met 5/20/22  BCAT 24/40  Goal 2: Pt will improve multistep sequencing in fx improving from max to mod to min cues. A cotreat was completed this date with  [x] PT    [] OT   [] ST      This pt requires simultaneous intervention of 2 skilled therapists to safely complete tasks to address complexity of deficits defined in the goals including:  [x]Attention   [x] Safety    [x]Problem Solving    [x]Sequencing     [x]Initiation    []Processing      [x]Recall of learned tasks  [] Communication  [] Self Feeding   []ADL's    []UE Function    []Motor planning   []Balance  []Energy Conservation   []Verbal cues   [] Tactile Cues  []Barriers     [x]Transfers   [x]Device Use O2 mgmt  Pt's response to cotreat: Good compliance; limited interaction except to ask for clarification, reduced eye contact  Progress toward goals: progressing with active cues; pt would benefit from caregiver training. Goal 3: Pt will improve recall of learned safety both verbally and fx with min cues Max to mod cues. Pt does show hesitation and thinking for learned sequences in attempt to process for safety with O2 line mgmt and maneuvering over throw rugs. As additional steps were added more cues were needed. Goal 4: Pt will attend to tasks with good eye contact and request assist when not understanding with min to no cues for 15 mins  Max cues verbal and tactile  Goal 5: Pt will improve verbal and fx problem solving with min cues. (ncluding med mgmt for discharge planning needs)  Fx problem solving required active cues. Sequencing and safety remains reduced. Max to mod cues                                   The patient demonstrated a fair response to todays treatment and is making slow progress toward established goals as evidenced by automatic responses and response to verbal and visual cues.    Ongoing deficits are observed in the areas of cognition and memory and continued focus on fx carryover is recommended. Alarm placed: [x]bed []chair   []other:   []PINEDA activated        Barriers to progress:   [] Fatigue        [x] Cognitive Deficits   [x] Memory Deficits   [x] Reduced Attn   [] Self Limiting Behaviors    [x] Reduced insight/awareness     [] Visual Deficits   [] Premorbid Conditions  [] Impulsivity     [] Other      Education/Interventions used this date: repetition for learned tasks    []   Progress was updated and reviewed in Rehabtracker with patient and/or family this         date. [x] Attending Care Conference for pt this date. See Team Patient Care Conference Note for updates.     Interventions used this date:  [] Speech/Language Treatment       [] Dysphagia Treatment     [x] Cognitive Skill Development  [] Instruction in HEP     [] Group Therapy  Other:         Assessment / Impression                                                          Treatment/Activity Tolerance:   [x] Tolerated Treatment well:     [] Patient limited by fatigue/pain:       [] Patient limited by medical complications:    [] Adverse Reaction to Tx:   [] Significant change in status:      Electronically Signed by  Smith Laguna, SLP, MA, CCC-SLP    5/24/2022  1:54 PM

## 2022-05-24 NOTE — PROGRESS NOTES
Occupational Therapy  Physical Rehabilitation: OCCUPATIONAL THERAPY     [x] daily progress note       [] discharge       Patient Name:  Reny Thakur   :  1945 MRN: [id number]  Room:  [address] [place] [address] Date of Admission: 2022  Rehabilitation Diagnosis:   Hepatic failure, unspecified with coma [K72.91]  Hepatic encephalopathy ([facility] [place] [address] [K72.90]       Date 2022       Day of ARU Week:  7   Time IN/OUT 9548-3455   Individual Tx Minutes 60   Group Tx Minutes    Co-Treat Minutes    Concurrent Tx Minutes    TOTAL Tx Time Mins 60   Variance Time    Variance Time []   Refusal due to:     []   Medical hold/reason:    []   Illness   []   Off Unit for test/procedure  []   Extra time needed to complete task  []   Therapeutic need  []   Other (specify):   Restrictions Restrictions/Precautions: Fall Risk,General Precautions         Communication with other providers: [x]   OK to see per nursing:     []   Spoke with team member regarding:      Subjective observations and cognitive status: Pt sleeping in bed on approach; difficult to wake and engage in conversation. Pt refused therapy stating, \"I'll do it later. I'm tired, leave me alone. \" Therapist encouraged pt to engage and participate in therapy this date to increase independence to return home. Pt continued to refuse. Speech therapist entered room to assist with engaging pt in therapy. Pt agreeable. Pain level/location:    /10       Location:    Discharge recommendations  Anticipated discharge date:  TBD  Destination: []?home alone   []?home alone w assist prn   [x]? home w/ family    []? Continuous supervision       []? SNF    []? Assisted living     []? Other:   Continued therapy: []?C OT  []? OUTPATIENT  OT   []?  No Further OT  Equipment needs: TBD     Toileting:   Declined need        Toilet Transfers:   NA   Device Used:    []   Standard Toilet         []   Grab Bars           []  Bedside Commode       []   Elevated Toilet          []   Other:        Bed Mobility:           []   Pt received out of bed   Supine --> Sit:  SBA c heavy cueing for initiation       Transfers:    Sit--> Stand:  SBA   Stand --> Sit:   SBA  Stand-Pivot:   SBA  Other:    Assistive device required for transfer:   RW      Functional Mobility: To increase BUE strength and endurance for functional transfers and ADLs, Pt self propelled WC 42 ft   Assistance:  Min A d/t veering to L   Device:   []   Rolling Walker     []   Standard Walker [x]   Wheelchair        []   Thedore Manuel       []   4-Wheeled Rosy Gambler         []   Cardiac Walker       []   Other:        Additional Therapeutic activities/exercises completed this date:     []   ADL Training   []   Balance/Postural training     []   Bed/Transfer Training   []   Endurance Training   []   Neuromuscular Re-ed   []   Nu-step:  Time:        Level:         #Steps:       []   Rebounder:    []  Seated     []  Standing        []   Supine Ther Ex (reps/sets):     [x]   Seated Ther Ex (reps/sets): To increase BUE endurance for ADLs and transfers, pt completed 1 set x10 reps of the following therex, c a 3# weighted bar:   Shoulder presses  Chest presses  Shoulder horizontal abduction/adduction  Concentric arm circles (clockwise and counterclockwise)  Bicep curls     []   Standing Ther Ex (reps/sets):     []   Other:      Comments:      Patient/Caregiver Education and Training:   []   [employer] Equipment Use  []   Bed Mobility/Transfer Technique/Safety  []   Energy Conservation Tips  []   Family training  []   Postural Awareness  []   Safety During Functional Activities  []   Reinforced Patient's Precautions   []   Progress was updated and reviewed in Rehabtracker with patient and/or family this         date. Treatment Plan for Next Session: Continue OT POC       Assessment: This pt demonstrated a negative response to today's treatment as evidenced by requiring several cues and another therapist to participate in therapy session.   The patient is making slow progress toward established goals as evidenced by QI scores. Ongoing deficits are observed in the areas of strength, endurance and continued focus on this is recommended. Treatment/Activity Tolerance:   [x] Tolerated treatment with no adverse effects    [] Patient limited by fatigue  [] Patient limited by pain   [] Patient limited by medical complications:    [] Adverse reaction to Tx:   [] Significant change in status    Safety:       []  bed alarm set    [x]  chair alarm set    []  Pt refused alarms                []  Telesitter activated      [x]  Gait belt used during tx session      []other:       Number of Minutes/Billable Intervention  Therapeutic Exercise 25   ADL Self-care    Neuro Re-Ed    Therapeutic Activity 35   Group    Other:    TOTAL 60       Social History  Social/Functional History  Lives With: Spouse  Type of Home: House  Home Layout: One level  Home Access: Stairs to enter with rails  Entrance Stairs - Number of Steps: 4 CORAZON front of the home and 5 CORAZON at the back of the home. HRs c both sets of stairs. Entrance Stairs - Rails: Both  Bathroom Shower/Tub: Walk-in shower  Bathroom Toilet: Standard  Bathroom Equipment:  (Per Pt's spouse, Pt has no equipment in the home.)  Bathroom Accessibility: Accessible  Has the patient had two or more falls in the past year or any fall with injury in the past year?: Yes (Pt reports 2 falls in the last year--fall when pulled by dog.  Second fall on back porch due to LOB and stairs.)  ADL Assistance: Independent  Homemaking Assistance: Needs assistance  Homemaking Responsibilities: Yes  Meal Prep Responsibility: No (Wife completes.)  Laundry Responsibility: No (Wife completes.)  Cleaning Responsibility: No (Wife completes.)  Bill Paying/Finance Responsibility: No (Wife completes.)  Shopping Responsibility: No (Wife completes.)  Health Care Management: Primary (Pt manages medications c 7-day pillbox.)  Ambulation Assistance: Independent  Transfer Assistance: Independent  Active : Yes  Mode of Transportation: Car,Truck,Motorcycle  Education: 8th grade; some difficulty in school  Occupation: Retired  Type of Occupation:   Leisure & Hobbies: Likes to work on cars, cats and a Vietnamese monika, wife does grocery shopping, out to eat. Pt manages medications c 7-day pillbox. Wife manages bills. Pt manages yardwork  IADL Comments: Likes to surf the internet  Additional Comments: Pt reports 2 falls in the last year--fall when pulled by dog. Second fall on back porch due to LOB and stairs. Objective                                                                                    Goals:  (Update in navigator)  Short Term Goals  Time Frame for Short term goals: STGs=LTGs:  Long Term Goals  Time Frame for Long term goals : 7-10 days or until d/c. Long Term Goal 1: Pt will self-feed c Ind. Long Term Goal 2: Pt will complete grooming tasks c Mod I.  Long Term Goal 3: Pt will complete total body bathing c AE PRN and Mod I.  Long Term Goal 4: Pt will complete UB dressing c Ind. Long Term Goal 5: Pt will complete LB dressing c AE PRN and Mod I. Additional Goals?: Yes  Long Term Goal 6: Pt will doff/don footwear c AE PRN and Mod I.  Long Term Goal 7: Pt will complete toileting c Mod I.  Long Term Goal 8: Pt will perform functional transfers (bed, chair, toilet, shower) c DME PRN and Mod I.  Long Term Goal 9: Pt will perform therex/therax to facilitate an increase in strength/endurance/ax tolerance (c emphasis on dynamic standing balance/tolerance > 8 mins) c Mod I.  Long Term Goal 10: Pt will complete oral hygiene c Mod I.:        Plan of Care                                                                              Times per week: 5 days per week for a minimum of 60 minutes/day plus group as appropriate for 60 minutes.   Treatment to include Plan  Times per Day: Daily  Current Treatment Recommendations: Strengthening,Balance training,Functional mobility training,Endurance training,Pain management,Safety education & training,Patient/Caregiver education & training,Self-Care / ADL,Home management training,Neuromuscular re-education,Cognitive reorientation,Equipment evaluation, education, & procurement,Coordination training,Cognitive/Perceptual training    Electronically signed by   NICHOLAS Tolentino,  5/24/2022, 3:23 PM

## 2022-05-24 NOTE — FLOWSHEET NOTE
[x] daily progress note       [] discharge       Patient Name:  Aleisha Alvarez   :  1945 MRN: [id number]  Room:  [address] [place] [address] Date of Admission: 2022  Rehabilitation Diagnosis:   Hepatic failure, unspecified with coma [K72.91]  Hepatic encephalopathy ([facility] [address] [K72.90]       Date 2022       Day of ARU Week:  7   Time IN/OUT 6610-1284   Individual Tx Minutes 35   TOTAL Tx Time Mins 35   Variance Time +35 minutes   Variance Time []   Refusal due to:     []   Medical hold/reason:    []   Illness   []   Off Unit for test/procedure  []   Extra time needed to complete task  []   Therapeutic need  [x]   Other (specify): Make-up minutes   Restrictions Restrictions/Precautions  Restrictions/Precautions: Fall Risk,General Precautions  Position Activity Restriction  Other position/activity restrictions: , PICC line RUE, silva catheter, 2L O2   Communication with other providers: [x]   OK to see per nursing:     []   Spoke with team member regarding:      Subjective observations and cognitive status: Pt seen sitting up in recliner at beginning of treatment. Pt on 2 L of O2. Pt required some encouragement to participate, but agreeable to ambulate to restroom to brush his teeth. Pain level/location: 0/10          Discharge recommendations  Anticipated discharge date:  TBD  Destination: []?home alone   []?home alone with assist PRN     [x]? home w/ family      []? Continuous supervision  []? SNF    []? Assisted living     []? Other:  Continued therapy: [x]? [facility] PT  []? OUTPATIENT PT   []? No Further PT  []? SNF PT  Caregiver training recommended: [x]? Yes  []? No   Equipment needs: 2WW  Aleisha Alvarez requires the assistance of a front-wheeled walker to successfully ambulate from room to room at home to allow completion of daily living tasks such as: bathing, toileting, dressing and grooming.   A wheeled walker is necessary due to the patient's unsteady gait, upper body weakness, inability to  a standard walker. This patient can ambulate only by pushing a walker instead of using a lesser assistive device such as a cane or crutch. Bed Mobility:           [x]   Pt received out of bed   Sit --> lying:  Supervision (pt required assist with bed rails and management of catheter and O2 line)     Transfers:    Sit--> Stand:  SBA  Stand --> Sit:  SBA  Assistive device required for transfer:  RW  Pt required assist with management of catheter and O2 line    Gait:    Distance:  10'+10'   Assistance:  SBA  Device:  RW  Gait Quality:  Reciprocal pattern; slow francois; pt required assist for management of catheter and O2 line    Additional Therapeutic activities/exercises completed this date:     []   Nu-step:  Time:        Level:         #Steps:       []   Rebounder:    []  Seated     []  Standing        [x]   Balance training: pt stood at sink and performed brushing of teeth SBA for balance with RW.          []   Postural training    [x]   Supine ther ex (reps/sets): concentric leg press (manual resist) x 10 reps each each LE for strengthening. []   Seated ther ex (reps/sets):     []   Standing ther ex (reps/sets):     []   Picking up object from floor (standing):                   []   Reacher used   []   Other:   []   Other:    Patient/Caregiver Education and Training:   [x]   Bed Mobility/Transfer technique/safety  [x]   Gait technique/sequencing  [x]   Proper use of assistive device  []   Advanced mobility safety and technique  []   Reinforced patient's precautions/mobility while maintaining precautions  []   Postural awareness  []   Family training    Treatment Plan for Next Session: gait; transfers; advanced gait; stair training; exercises       Assessment: This pt demonstrated a negative response to today's treatment as evidenced by increased fatigue today. The patient is not making progress toward established goals as evidenced by QI scores.  Ongoing deficits are observed in the areas of strength, balance, endurance, and safety and continued focus on this is recommended. Treatment/Activity Tolerance:   [] Tolerated treatment with no adverse effects    [x] Patient limited by fatigue  [] Patient limited by pain   [] Patient limited by medical complications:    [] Adverse reaction to Tx:   [] Significant change in status    Safety:       [x]  bed alarm set    []  chair alarm set    []  Pt refused alarms                [x]  Telesitter activated      [x]  Gait belt used during tx session      [x]other: pt left in semi-griffiths's position in bed with call light at end of treatment. Number of Minutes/Billable Intervention  Gait Training 15   Therapeutic Exercise    Neuro Re-Ed    Therapeutic Activity 20   Wheelchair Propulsion    Group    Other:    TOTAL 35         Social History  Social/Functional History  Lives With: Spouse  Type of Home: House  Home Layout: One level  Home Access: Stairs to enter with rails  Entrance Stairs - Number of Steps: 4 CORAZON front of the home and 5 CORAZON at the back of the home. HRs c both sets of stairs. Entrance Stairs - Rails: Both  Bathroom Shower/Tub: Walk-in shower  Bathroom Toilet: Standard  Bathroom Equipment:  (Per Pt's spouse, Pt has no equipment in the home.)  Bathroom Accessibility: Accessible  Has the patient had two or more falls in the past year or any fall with injury in the past year?: Yes (Pt reports 2 falls in the last year--fall when pulled by dog.  Second fall on back porch due to LOB and stairs.)  ADL Assistance: Independent  Homemaking Assistance: Needs assistance  Homemaking Responsibilities: Yes  Meal Prep Responsibility: No (Wife completes.)  Laundry Responsibility: No (Wife completes.)  Cleaning Responsibility: No (Wife completes.)  Bill Paying/Finance Responsibility: No (Wife completes.)  Shopping Responsibility: No (Wife completes.)  Health Care Management: Primary (Pt manages medications c 7-day pillbox.)  Ambulation Assistance: Independent  Transfer Assistance: Independent  Active : Yes  Mode of Transportation: Car,Truck,Motorcycle  Education: 8th grade; some difficulty in school  Occupation: Retired  Type of Occupation:   Leisure & Hobbies: Likes to work on cars, cats and a Salvadorean monika, wife does grocery shopping, out to eat. Pt manages medications c 7-day pillbox. Wife manages bills. Pt manages yardwork  IADL Comments: Likes to surf the internet  Additional Comments: Pt reports 2 falls in the last year--fall when pulled by dog. Second fall on back porch due to LOB and stairs. Objective                                                                                    Goals:  (Update in navigator)   : Long Term Goals  Time Frame for Long term goals : 7-10 tx days:  Long term goal 1: Pt will complete bed mobility (scooting, rolling R/L, and sup<->sit) Ind.  Long term goal 2: Pt will complete OOB transfers Mod Ind-Ind. Long term goal 3: Pt will ambulate >/=150 ft over level surface and at least 10 ft of uneven surface using 2WW Mod Ind. Long term goal 4: Pt will ascend/descend curb step using 2WW and 1 flight of stairs using railings Mod Ind. Long term goal 5: Pt will complete object retrieval from the floor with 2WW and reacher Mod Ind.:        Plan of Care                                                                              Times per week: 5 days per week for a minimum of 60 minutes/day plus group as appropriate for 60 minutes.   Treatment to include Current Treatment Recommendations: Strengthening,Balance training,Functional mobility training,Transfer training,Cognitive/Perceptual training,Endurance training,Gait training,Stair training,Cognitive reorientation,Home exercise program,Safety education & training,Patient/Caregiver education & training,Equipment evaluation, education, & procurement,Therapeutic activities    Electronically signed by   Kavon Alfaro, [id number]  5/24/2022, 3:46 PM

## 2022-05-24 NOTE — PROGRESS NOTES
Subjective observations and cognitive status: (AM1) Pt in recliner, asleep, awakens when name is called, breakfast tray untouched, once prompted he poured milk in cereal bowl but used wrapped straw to stir it, required redirection to use spoon instead and appeared confused of why he has to use the spoon instead of the straw, initiated eating once prompted, on 2.5L O2 via nasal cannula, silva catheter in place,  remains to be in use in room. (AM2) Pt resting in recliner, on 3L O2 via nasal cannula, silva catheter and  in place, states \"Are you giving up on me? \"      Pain level/location: None reported at rest prior to activities    Discharge recommendations  Anticipated discharge date: TBD   Destination: []home alone   []home alone with assist PRN     [x] home w/ family      [] Continuous supervision  []SNF    [] Assisted living     [] Other:  Continued therapy: [x]HHC PT  []OUTPATIENT PT   [] No Further PT  []SNF PT  Caregiver training recommended: [x]Yes  [] No   Equipment needs: 2WW  Velta Cheeks requires the assistance of a front-wheeled walker to successfully ambulate from room to room at home to allow completion of daily living tasks such as: bathing, toileting, dressing and grooming. A wheeled walker is necessary due to the patient's unsteady gait, upper body weakness, inability to  a standard walker. This patient can ambulate only by pushing a walker instead of using a lesser assistive device such as a cane or crutch.       PT IRF-VAMSI scores and goals for discharge assessment:   Sit to Lying  Assistance Needed: Supervision or touching assistance  Comment: SBA-Sup (required assist on silva catheter mannagement)  CARE Score: 4  Discharge Goal: Independent    Sit to Stand  Assistance Needed: Setup or clean-up assistance  Comment: only required positioning of silva catheter line/bag prior to standing; consistent with pushing of BUE to be able to stand; eventually also required set-up assist of O2 line prior to standing  CARE Score: 5  Discharge Goal: Independent    Chair/Bed-to-Chair Transfer  Assistance Needed: Supervision or touching assistance  Comment: SBA-Sup with 2WW; required assist with O2 line management on latter half of this tx session due to need for supplemental O2  CARE Score: 4  Discharge Goal: Independent    Toilet Transfer  Assistance needed: Supervision or touching assistance  Comment: SBA-Sup with 2WW and grab bars  CARE Score: 4  Discharge Goal: Independent    Walk 10 Feet?   Walk 10 Feet?: Yes      Walking Ability  Does the Patient Walk?: Yes    Walk 10 Feet  Assistance Needed: Supervision or touching assistance  Comment: set-up assist or silva catheter line/bag only prior to ambulation but eventually required use of supplemental O2 as this tx session progressed requiring assist on safe management of it througout this mobility task; gait quality was steady throughout  CARE Score: 4  Discharge Goal: Independent    Walk 50 Feet with Two Turns  Assistance Needed: Supervision or touching assistance  Comment: set-up assist or silva catheter line/bag only prior to ambulation but eventually required use of supplemental O2 as this tx session progressed requiring assist on safe management of it througout this mobility task; gait quality was steady throughout  CARE Score: 4  Discharge Goal: Independent    Walk 150 Feet  Assistance Needed: Supervision or touching assistance  Comment: set-up assist or silva catheter line/bag only prior to ambulation but eventually required use of supplemental O2 as this tx session progressed requiring assist on safe management of it througout this mobility task; gait quality was steady throughout  CARE Score: 4  Discharge Goal: Independent    Walking 10 Feet on Uneven Surfaces  Assistance Needed: Supervision or touching assistance  Comment: SBA-Sup using 2WW with VCs on O2 line awareness  CARE Score: 4  Discharge Goal: Independent    Additional Therapeutic activities/exercises completed this date:     []   Nu-step:  Time:        Level:         #Steps:       []   Rebounder:    []  Seated     []  Standing        []   Balance training         []   Postural training    []   Supine ther ex (reps/sets):     []   Seated ther ex (reps/sets):     []   Standing ther ex (reps/sets):     [x]   Other: Toileting activity completed with Min A (per observation of rehab aide)    []   Other:    Comments:      Patient/Caregiver Education and Training:   []   Role of PT  []   Education about Dx  []   Use of call light for assist   []   HEP provided and explained   []   Treatment plan reviewed  []   Home safety  []   Wheelchair mobility/management   []   Body mechanics  []   Bed Mobility/Transfer technique  []   Gait technique/sequencing  []   Proper use of assistive device/adaptive equipment  [x]   Advanced mobility safety and technique  []   Reinforced patient's precautions/mobility while maintaining precautions  []   Postural awareness  []   Family/caregiver training  []   Progress was updated and reviewed in Rehabtracker with patient and/or family this date. [x]   Other: O2 line awareness     Treatment Plan for Next Session: stairs, continue transfers and short distance gait training with O2 line management, advanced gait training     Assessment: This pt demonstrated desaturation in room air during level surface gait training and did not recover sufficiently during extended sitting break and with verbal prompts to perform PLB, thus, requiring continued use of supplemental O2 for safety. Pt with delayed processing and decreased problem solving requiring Mod VCs for safety to simultaneously manage O2 line and 2WW for stand->sit transfers, short distance ambulation, and ambulation over rugs.     Treatment/Activity Tolerance:   [] Tolerated treatment with no adverse effects    [x] Patient limited by fatigue  [] Patient limited by pain   [] Patient limited by medical complications:    [] Adverse reaction to Tx:   [] Significant change in status    Safety:       [x]  (AM2)bed alarm set    []  chair alarm set    []  Pt refused alarms                []  Telesitter activated      [x]  Gait belt used during tx session      [x]other: (AM1)  Pt left in bathroom under rehab aide's care     Number of Minutes/Billable Intervention  Gait Training 45   Therapeutic Exercise    Neuro Re-Ed    Therapeutic Activity 50   Wheelchair Propulsion    Group    Other:    TOTAL 95     Social History  Social/Functional History  Lives With: Spouse  Type of Home: House  Home Layout: One level  Home Access: Stairs to enter with rails  Entrance Stairs - Number of Steps: 4 CORAZON front of the home and 5 CORAZON at the back of the home. HRs c both sets of stairs. Entrance Stairs - Rails: Both  Bathroom Shower/Tub: Walk-in shower  Bathroom Toilet: Standard  Bathroom Equipment:  (Per Pt's spouse, Pt has no equipment in the home.)  Bathroom Accessibility: Accessible  Has the patient had two or more falls in the past year or any fall with injury in the past year?: Yes (Pt reports 2 falls in the last year--fall when pulled by dog. Second fall on back porch due to LOB and stairs.)  ADL Assistance: Independent  Homemaking Assistance: Needs assistance  Homemaking Responsibilities: Yes  Meal Prep Responsibility: No (Wife completes.)  Laundry Responsibility: No (Wife completes.)  Cleaning Responsibility: No (Wife completes.)  Bill Paying/Finance Responsibility: No (Wife completes.)  Shopping Responsibility: No (Wife completes.)  Health Care Management: Primary (Pt manages medications c 7-day pillbox.)  Ambulation Assistance: Independent  Transfer Assistance: Independent  Active : Yes  Mode of Transportation: Car,Truck,Motorcycle  Education: 8th grade; some difficulty in school  Occupation: Retired  Type of Occupation:   Leisure & Hobbies: Likes to work on cars, cats and a [place] monika, wife does grocery shopping, out to eat.  Pt manages medications c 7-day pillbox. Wife manages bills. Pt manages yardwork  IADL Comments: Likes to surf the internet  Additional Comments: Pt reports 2 falls in the last year--fall when pulled by dog. Second fall on back porch due to LOB and stairs. Objective                                                                                    Goals:  (Update in navigator)   : Long Term Goals  Time Frame for Long term goals : 7-10 tx days:  Long term goal 1: Pt will complete bed mobility (scooting, rolling R/L, and sup<->sit) Ind.  Long term goal 2: Pt will complete OOB transfers Mod Ind-Ind. Long term goal 3: Pt will ambulate >/=150 ft over level surface and at least 10 ft of uneven surface using 2WW Mod Ind. Long term goal 4: Pt will ascend/descend curb step using 2WW and 1 flight of stairs using railings Mod Ind. Long term goal 5: Pt will complete object retrieval from the floor with 2WW and reacher Mod Ind.:        Plan of Care                                                                              Times per week: 5 days per week for a minimum of 60 minutes/day plus group as appropriate for 60 minutes.   Treatment to include Current Treatment Recommendations: Strengthening,Balance training,Functional mobility training,Transfer training,Cognitive/Perceptual training,Endurance training,Gait training,Stair training,Cognitive reorientation,Home exercise program,Safety education & training,Patient/Caregiver education & training,Equipment evaluation, education, & procurement,Therapeutic activities    Electronically signed by   Yony Adkins PT  5/24/2022, 11:33 AM

## 2022-05-25 LAB
CULTURE: NORMAL
EER IMMUNOFIX ELECTROPHORESIS GEL: NORMAL
GLUCOSE BLD-MCNC: 102 MG/DL (ref 70–99)
GLUCOSE BLD-MCNC: 110 MG/DL (ref 70–99)
GLUCOSE BLD-MCNC: 111 MG/DL (ref 70–99)
GLUCOSE BLD-MCNC: 117 MG/DL (ref 70–99)
GLUCOSE BLD-MCNC: 162 MG/DL (ref 70–99)
IMMUNOFIX ELECTROPHORESIS GEL: NORMAL
Lab: NORMAL
SPECIMEN: NORMAL

## 2022-05-25 PROCEDURE — 6370000000 HC RX 637 (ALT 250 FOR IP): Performed by: HOSPITALIST

## 2022-05-25 PROCEDURE — 97530 THERAPEUTIC ACTIVITIES: CPT

## 2022-05-25 PROCEDURE — 1280000000 HC REHAB R&B

## 2022-05-25 PROCEDURE — 6360000002 HC RX W HCPCS: Performed by: HOSPITALIST

## 2022-05-25 PROCEDURE — 82962 GLUCOSE BLOOD TEST: CPT

## 2022-05-25 PROCEDURE — 99232 SBSQ HOSP IP/OBS MODERATE 35: CPT | Performed by: PHYSICAL MEDICINE & REHABILITATION

## 2022-05-25 PROCEDURE — 2700000000 HC OXYGEN THERAPY PER DAY

## 2022-05-25 PROCEDURE — 94761 N-INVAS EAR/PLS OXIMETRY MLT: CPT

## 2022-05-25 PROCEDURE — 97116 GAIT TRAINING THERAPY: CPT

## 2022-05-25 PROCEDURE — 51798 US URINE CAPACITY MEASURE: CPT

## 2022-05-25 PROCEDURE — 51798 US URINE CAPACITY MEASURE: CPT | Performed by: PHYSICIAN ASSISTANT

## 2022-05-25 PROCEDURE — 97535 SELF CARE MNGMENT TRAINING: CPT

## 2022-05-25 RX ADMIN — LEVOTHYROXINE SODIUM 50 MCG: 0.05 TABLET ORAL at 06:16

## 2022-05-25 RX ADMIN — RIFAXIMIN 550 MG: 550 TABLET ORAL at 08:04

## 2022-05-25 RX ADMIN — NADOLOL 40 MG: 20 TABLET ORAL at 08:14

## 2022-05-25 RX ADMIN — LACTULOSE 20 G: 10 SOLUTION ORAL at 14:11

## 2022-05-25 RX ADMIN — RIFAXIMIN 550 MG: 550 TABLET ORAL at 20:27

## 2022-05-25 RX ADMIN — DULOXETINE HYDROCHLORIDE 60 MG: 30 CAPSULE, DELAYED RELEASE ORAL at 20:27

## 2022-05-25 RX ADMIN — ENOXAPARIN SODIUM 40 MG: 100 INJECTION SUBCUTANEOUS at 08:07

## 2022-05-25 RX ADMIN — PANTOPRAZOLE SODIUM 40 MG: 40 TABLET, DELAYED RELEASE ORAL at 06:16

## 2022-05-25 RX ADMIN — MAGNESIUM OXIDE 400 MG (241.3 MG MAGNESIUM) TABLET 200 MG: TABLET at 20:27

## 2022-05-25 RX ADMIN — LACTULOSE 20 G: 10 SOLUTION ORAL at 20:27

## 2022-05-25 RX ADMIN — LACTULOSE 20 G: 10 SOLUTION ORAL at 06:16

## 2022-05-25 RX ADMIN — HYDROCORTISONE ACETATE 25 MG: 25 SUPPOSITORY RECTAL at 08:03

## 2022-05-25 RX ADMIN — FUROSEMIDE 40 MG: 40 TABLET ORAL at 08:05

## 2022-05-25 RX ADMIN — SPIRONOLACTONE 100 MG: 50 TABLET ORAL at 08:05

## 2022-05-25 RX ADMIN — MAGNESIUM OXIDE 400 MG (241.3 MG MAGNESIUM) TABLET 200 MG: TABLET at 08:06

## 2022-05-25 RX ADMIN — INSULIN LISPRO 1 UNITS: 100 INJECTION, SOLUTION INTRAVENOUS; SUBCUTANEOUS at 20:30

## 2022-05-25 ASSESSMENT — PAIN SCALES - GENERAL
PAINLEVEL_OUTOF10: 0
PAINLEVEL_OUTOF10: 0

## 2022-05-25 NOTE — PROGRESS NOTES
Occupational Therapy  Physical Rehabilitation: OCCUPATIONAL THERAPY     [x] daily progress note       [] discharge       Patient Name:  Dannielle Garcia   :  1945 MRN: [id number]  Room:  [address] [place] [address] Date of Admission: 2022  Rehabilitation Diagnosis:   Hepatic failure, unspecified with coma [K72.91]  Hepatic encephalopathy ([address] [K72.90]       Date 2022       Day of ARU Week:  1   Time IN/OUT 1984-0213   Individual Tx Minutes 60   Group Tx Minutes    Co-Treat Minutes    Concurrent Tx Minutes    TOTAL Tx Time Mins 60   Variance Time    Variance Time []   Refusal due to:     []   Medical hold/reason:    []   Illness   []   Off Unit for test/procedure  []   Extra time needed to complete task  []   Therapeutic need  []   Other (specify):   Restrictions Restrictions/Precautions: Fall Risk,General Precautions         Communication with other providers: [x]   OK to see per nursing:     []   Spoke with team member regarding:      Subjective observations and cognitive status: Pt sleeping on approach; lunch untouched, difficult to wake and O2 line doffed. O2 level checked and was at 82%. Donned O2 and was able to recover to 94% in 1 minute. Pt required max cues to engaged in therapy this date. Pain level/location:    /10       Location: none    Discharge recommendations  Anticipated discharge date:    Destination: []home alone   []home alone w assist prn   [x] home w/ family    [] Continuous supervision       []SNF    [] Assisted living     [] Other:   Continued therapy: [x]HHC OT  []OUTPATIENT  OT   [] No Further OT  Equipment needs: Dannielle Garcia requires the assistance of a shower chair to successfully and safely complete bathing activities necessary due to reduced balance, endurance, need for ADL assist, and LE weakness and reduced ROM. These tasks cannot be safely completed without this device and would place Dannielle Garcia at greater risk of falls.      Toileting:   Supervision for clothing management; pt urinated only       Toilet Transfers:   Supervision, cues to manage O2 line   Device Used:    []   Standard Toilet         []   Grab Bars           []  Bedside Commode       []   Elevated Toilet          []   Other:        Bed Mobility:           []   Pt received out of bed   Supine --> Sit:  Min A for initiation of task       Transfers:    Sit--> Stand:  Supervision  Stand --> Sit:   Supervision  Stand-Pivot:   Supervision  Other:    Assistive device required for transfer:   RW       Functional Mobility:  To<>from bathroom   Assistance:  Supervision, cues for O2 line management   Device:   [x]   Rolling Walker     []   Standard Walker []   Wheelchair        []   Sterling Doctor       []   4-Wheeled Donnamae Johnny         []   Cardiac Walker       []   Other:          Additional Therapeutic activities/exercises completed this date:     [x]   ADL Training: Pt had not eaten lunch on approach; Pt sat EOB c min A to engage in self feeding task. Once sitting up and engaged, Pt did not require assist.    [x]   Balance/Postural training: dynamic sitting balance task sitting EOB unsupported c occasional support of UE for ~40 mins. []   Bed/Transfer Training   []   Endurance Training   []   Neuromuscular Re-ed   []   Nu-step:  Time:        Level:         #Steps:       []   Rebounder:    []  Seated     []  Standing        []   Supine Ther Ex (reps/sets):     []   Seated Ther Ex (reps/sets):     []   Standing Ther Ex (reps/sets):     []   Other:      Comments:      Patient/Caregiver Education and Training:   []   [employer] Equipment Use  []   Bed Mobility/Transfer Technique/Safety  []   Energy Conservation Tips  []   Family training  []   Postural Awareness  []   Safety During Functional Activities  []   Reinforced Patient's Precautions   []   Progress was updated and reviewed in Rehabtracker with patient and/or family this         date. Treatment Plan for Next Session: Continue OT POC       Assessment:   This pt demonstrated a negative response to today's treatment as evidenced by required max cues to participate in therapy. The patient is making progress toward established goals as evidenced by QI scores. Ongoing deficits are observed in the areas of endurance, strength for ADLs and continued focus on this is recommended. Treatment/Activity Tolerance:   [x] Tolerated treatment with no adverse effects    [] Patient limited by fatigue  [] Patient limited by pain   [] Patient limited by medical complications:    [] Adverse reaction to Tx:   [] Significant change in status    Safety:       []  bed alarm set    []  chair alarm set    []  Pt refused alarms                []  Telesitter activated      [x]  Gait belt used during tx session      [x]other:  Left pt with nsg tech in room      Number of Minutes/Billable Intervention  Therapeutic Exercise    ADL Self-care 15+30   Neuro Re-Ed    Therapeutic Activity 15   Group    Other:    TOTAL 60       Social History  Social/Functional History  Lives With: Spouse  Type of Home: House  Home Layout: One level  Home Access: Stairs to enter with rails  Entrance Stairs - Number of Steps: 4 CORAZON front of the home and 5 CORAZON at the back of the home. HRs c both sets of stairs. Entrance Stairs - Rails: Both  Bathroom Shower/Tub: Walk-in shower  Bathroom Toilet: Standard  Bathroom Equipment:  (Per Pt's spouse, Pt has no equipment in the home.)  Bathroom Accessibility: Accessible  Has the patient had two or more falls in the past year or any fall with injury in the past year?: Yes (Pt reports 2 falls in the last year--fall when pulled by dog.  Second fall on back porch due to LOB and stairs.)  ADL Assistance: Independent  Homemaking Assistance: Needs assistance  Homemaking Responsibilities: Yes  Meal Prep Responsibility: No (Wife completes.)  Laundry Responsibility: No (Wife completes.)  Cleaning Responsibility: No (Wife completes.)  Bill Paying/Finance Responsibility: No (Wife completes.)  Shopping Responsibility: No (Wife completes.)  Health Care Management: Primary (Pt manages medications c 7-day pillbox.)  Ambulation Assistance: Independent  Transfer Assistance: Independent  Active : Yes  Mode of Transportation: Car,Truck,Motorcycle  Education: 8th grade; some difficulty in school  Occupation: Retired  Type of Occupation:   Leisure & Hobbies: Likes to work on cars, cats and a Nepali monika, wife does grocery shopping, out to eat. Pt manages medications c 7-day pillbox. Wife manages bills. Pt manages yardwork  IADL Comments: Likes to surf the internet  Additional Comments: Pt reports 2 falls in the last year--fall when pulled by dog. Second fall on back porch due to LOB and stairs. Objective                                                                                    Goals:  (Update in navigator)  Short Term Goals  Time Frame for Short term goals: STGs=LTGs:  Long Term Goals  Time Frame for Long term goals : 7-10 days or until d/c. Long Term Goal 1: Pt will self-feed c Ind. Long Term Goal 2: Pt will complete grooming tasks c Mod I.  Long Term Goal 3: Pt will complete total body bathing c AE PRN and Mod I.  Long Term Goal 4: Pt will complete UB dressing c Ind. Long Term Goal 5: Pt will complete LB dressing c AE PRN and Mod I. Additional Goals?: Yes  Long Term Goal 6: Pt will doff/don footwear c AE PRN and Mod I.  Long Term Goal 7: Pt will complete toileting c Mod I.  Long Term Goal 8: Pt will perform functional transfers (bed, chair, toilet, shower) c DME PRN and Mod I.  Long Term Goal 9: Pt will perform therex/therax to facilitate an increase in strength/endurance/ax tolerance (c emphasis on dynamic standing balance/tolerance > 8 mins) c Mod I.  Long Term Goal 10:  Pt will complete oral hygiene c Mod I.:        Plan of Care                                                                              Times per week: 5 days per week for a minimum of 60 minutes/day plus group as appropriate for 60 minutes.   Treatment to include Plan  Times per Day: Daily  Current Treatment Recommendations: Strengthening,Balance training,Functional mobility training,Endurance training,Pain management,Safety education & training,Patient/Caregiver education & training,Self-Care / ADL,Home management training,Neuromuscular re-education,Cognitive reorientation,Equipment evaluation, education, & procurement,Coordination training,Cognitive/Perceptual training    Electronically signed by   NICHOLAS Conti,  5/25/2022, 2:38 PM

## 2022-05-25 NOTE — PROGRESS NOTES
Physical Therapy    [x] daily progress note       [] discharge       Patient Name:  Sharla Shetty   :  1945 MRN: [id number]  Room:  [address] [place] [address] Date of Admission: 2022  Rehabilitation Diagnosis:   Hepatic failure, unspecified with coma [K72.91]  Hepatic encephalopathy ([facility] [address] [K72.90]       Date 2022       Day of ARU Week:  1   Time IN//1055   Individual Tx Minutes 60   Group Tx Minutes    Co-Treat Minutes    Concurrent Tx Minutes    TOTAL Tx Time Mins 60   Variance Time    Variance Time []   Refusal due to:     []   Medical hold/reason:    []   Illness   []   Off Unit for test/procedure  []   Extra time needed to complete task  []   Therapeutic need  []   Other (specify):   Restrictions Restrictions/Precautions  Restrictions/Precautions: Fall Risk,General Precautions  Position Activity Restriction  Other position/activity restrictions: PINEDA, PICC line RUE, silva catheter, 2L O2   Interdisciplinary communication [x]   Cleared for therapy per nursing     []   RN notified about issues during session  []   RN updated on pt performance  []   Spoke with   []   Spoke with OT  []   Spoke with MD  []   Other:    Subjective observations and cognitive status: Pt resting in recliner, alert, silva catheter discontinued, breathing in room air and did not appear in pulmoanry distress     Pain level/location:    /10       Location:    Discharge recommendations  Anticipated discharge date:  2022  Destination: []home alone   []home alone with assist PRN     [x] home w/ family      [] Continuous supervision  []SNF    [] Assisted living     [] Other:  Continued therapy: [x]HHC PT  []OUTPATIENT PT   [] No Further PT  []SNF PT  Caregiver training recommended: [x]Yes  [] No   Equipment needs: 2WW  Sharla Shetty requires the assistance of a front-wheeled walker to successfully ambulate from room to room at home to allow completion of daily living tasks such as: bathing, toileting, dressing and grooming. A wheeled walker is necessary due to the patient's unsteady gait, upper body weakness, inability to  a standard walker. This patient can ambulate only by pushing a walker instead of using a lesser assistive device such as a cane or crutch. PT IRF-VAMSI scores and goals for discharge assessment:     Sit to Stand  Assistance Needed: Independent  Comment: Mod Ind with 2WW  CARE Score: 6  Discharge Goal: Independent    Chair/Bed-to-Chair Transfer  Assistance Needed: Independent  Comment: Mod Ind with 2WW  CARE Score: 6  Discharge Goal: Independent    Car Transfer  Assistance Needed: Independent  Comment: Mod Ind with 2WW  CARE Score: 6  Discharge Goal: Independent    Walk 10 Feet? Walk 10 Feet?: Yes    1 Step  1 Step?: Yes    Picking Up Object  Assistance Needed: Supervision or touching assistance  Comment: CGA with LUE support on 2WW without use of reacher (task was more automatic but had mild standing balance deficit); SBA-Sup with 2WW and reacher (pt had decreased problem solving requiring increase verbal cues on proper use of adaptive equipment)  CARE Score: 4  Discharge Goal: Independent    Wheelchair Ability  Uses a Wheelchair and/or Scooter?: No     Walking Ability  Does the Patient Walk?: Yes    Walk 10 Feet  Assistance Needed: Independent  Comment: Mod Ind with 2WW  CARE Score: 6  Discharge Goal: Independent    Walk 50 Feet with Two Turns  Assistance Needed: Independent  Comment: Mod Ind with 2WW  CARE Score: 6  Discharge Goal: Independent    Walk 150 Feet  Assistance Needed: Independent  Comment:  Mod Ind with 2WW  CARE Score: 6  Discharge Goal: Independent    Walking 10 Feet on Uneven Surfaces  Assistance Needed: Supervision or touching assistance  Comment: SBA-Sup (pt with tendency to lift and carry 2WW while ambulating over outdoor surfaces requiring verbal cues for safety)  CARE Score: 4  Discharge Goal: Independent    1 Step (Curb)  Assistance Needed: Supervision or touching assistance  Comment: SBA using 2WW  CARE Score: 4  Discharge Goal: Independent    4 Steps  Assistance Needed: Independent  Comment: Mod Ind with railings (usually witu step-through pattern to ascend/descend 4\"/6\" step heights)  CARE Score: 6  Discharge Goal: Independent    12 Steps  Assistance Needed: Independent  Comment: Mod Ind with railings (usually witu step-through pattern to ascend/descend 4\"/6\" step heights)  CARE Score: 6  Discharge Goal: Independent      Additional Therapeutic activities/exercises completed this date:     []   Nu-step:  Time:        Level:         #Steps:       []   Rebounder:    []  Seated     []  Standing        [x]   Balance training: 10 ft with turn level surface ambulation in room without AD with SBA          []   Postural training    []   Supine ther ex (reps/sets):     []   Seated ther ex (reps/sets):     []   Standing ther ex (reps/sets):     []   Other: Toileting activity completed with    []   Other:    Comments:      Patient/Caregiver Education and Training:   []   Role of PT  []   Education about Dx  []   Use of call light for assist   []   HEP provided and explained   []   Treatment plan reviewed  []   Home safety  []   Wheelchair mobility/management   []   Body mechanics  []   Bed Mobility/Transfer technique  []   Gait technique/sequencing  []   Proper use of assistive device/adaptive equipment  [x]   Stair training/Advanced mobility safety and technique  []   Reinforced patient's precautions/mobility while maintaining precautions  []   Postural awareness  []   Family/caregiver training  []   Progress was updated and reviewed in Rehabtracker with patient and/or family this date. [x]   Other: activity pacing and PLB to manage dyspnea, discharge date     Treatment Plan for Next Session: discharge QI tasks       Assessment:   This pt demonstrated desaturation in room air at rest and with activities but inconsistently today and SpO2 increased with extended sitting break and with verbal prompts to perform PLB. Pt with increased independence in his functional mobility as he was more attentive to required task with his improved level of alertness, being in room air, and discontinuation of silva catheter. Treatment/Activity Tolerance:   [x] Tolerated treatment with no adverse effects    [] Patient limited by fatigue  [] Patient limited by pain   [] Patient limited by medical complications:    [] Adverse reaction to Tx:   [] Significant change in status    Safety:       []  bed alarm set    [x]  chair alarm set    []  Pt refused alarms                [x]  Telesitter activated      [x]  Gait belt used during tx session      []other:       Number of Minutes/Billable Intervention  Gait Training 40   Therapeutic Exercise    Neuro Re-Ed    Therapeutic Activity 20   Wheelchair Propulsion    Group    Other:    TOTAL 60     Social History  Social/Functional History  Lives With: Spouse  Type of Home: House  Home Layout: One level  Home Access: Stairs to enter with rails  Entrance Stairs - Number of Steps: 4 CORAZON front of the home and 5 CORAZON at the back of the home. HRs c both sets of stairs. Entrance Stairs - Rails: Both  Bathroom Shower/Tub: Walk-in shower  Bathroom Toilet: Standard  Bathroom Equipment:  (Per Pt's spouse, Pt has no equipment in the home.)  Bathroom Accessibility: Accessible  Has the patient had two or more falls in the past year or any fall with injury in the past year?: Yes (Pt reports 2 falls in the last year--fall when pulled by dog.  Second fall on back porch due to LOB and stairs.)  ADL Assistance: Independent  Homemaking Assistance: Needs assistance  Homemaking Responsibilities: Yes  Meal Prep Responsibility: No (Wife completes.)  Laundry Responsibility: No (Wife completes.)  Cleaning Responsibility: No (Wife completes.)  Bill Paying/Finance Responsibility: No (Wife completes.)  Shopping Responsibility: No (Wife completes.)  Health Care Management: Primary (Pt manages medications c 7-day pillbox.)  Ambulation Assistance: Independent  Transfer Assistance: Independent  Active : Yes  Mode of Transportation: Car,Truck,Motorcycle  Education: 8th grade; some difficulty in school  Occupation: Retired  Type of Occupation:   Leisure & Hobbies: Likes to work on cars, cats and a Turkish monika, wife does grocery shopping, out to eat. Pt manages medications c 7-day pillbox. Wife manages bills. Pt manages yardwork  IADL Comments: Likes to surf the internet  Additional Comments: Pt reports 2 falls in the last year--fall when pulled by dog. Second fall on back porch due to LOB and stairs. Objective                                                                                    Goals:  (Update in navigator)   : Long Term Goals  Time Frame for Long term goals : 7-10 tx days:  Long term goal 1: Pt will complete bed mobility (scooting, rolling R/L, and sup<->sit) Ind.  Long term goal 2: Pt will complete OOB transfers Mod Ind-Ind. Long term goal 3: Pt will ambulate >/=150 ft over level surface and at least 10 ft of uneven surface using 2WW Mod Ind. Long term goal 4: Pt will ascend/descend curb step using 2WW and 1 flight of stairs using railings Mod Ind. Long term goal 5: Pt will complete object retrieval from the floor with 2WW and reacher Mod Ind.:        Plan of Care                                                                              Times per week: 5 days per week for a minimum of 60 minutes/day plus group as appropriate for 60 minutes.   Treatment to include Current Treatment Recommendations: Strengthening,Balance training,Functional mobility training,Transfer training,Cognitive/Perceptual training,Endurance training,Gait training,Stair training,Cognitive reorientation,Home exercise program,Safety education & training,Patient/Caregiver education & training,Equipment evaluation, education, & procurement,Therapeutic activities    Electronically signed by   [employer] Drew Hannah, PT  5/25/2022, 10:52 AM

## 2022-05-25 NOTE — PLAN OF CARE
Problem: Safety - Adult  Goal: Free from fall injury  Outcome: Progressing     Problem: ABCDS Injury Assessment  Goal: Absence of physical injury  Outcome: Progressing     Problem: Skin/Tissue Integrity  Goal: Absence of new skin breakdown  Description: 1. Monitor for areas of redness and/or skin breakdown  2. Assess vascular access sites hourly  3. Every 4-6 hours minimum:  Change oxygen saturation probe site  4. Every 4-6 hours:  If on nasal continuous positive airway pressure, respiratory therapy assess nares and determine need for appliance change or resting period. Outcome: Progressing     Problem: Confusion  Goal: Confusion, delirium, dementia, or psychosis is improved or at baseline  Description: INTERVENTIONS:  1. Assess for possible contributors to thought disturbance, including medications, impaired vision or hearing, underlying metabolic abnormalities, dehydration, psychiatric diagnoses, and notify attending LIP  2. [place] high risk fall precautions, as indicated  3. Provide frequent short contacts to provide reality reorientation, refocusing and direction  4. Decrease environmental stimuli, including noise as appropriate  5. Monitor and intervene to maintain adequate nutrition, hydration, elimination, sleep and activity  6. If unable to ensure safety without constant attention obtain sitter and review sitter guidelines with assigned personnel  7.  Initiate Psychosocial CNS and Spiritual Care consult, as indicated  Outcome: Progressing

## 2022-05-25 NOTE — CARE COORDINATION
Case mgt updated patient in room. Pleased with 5/27 planned discharge. Agreeable to case mgt working out details with his spouse. Case mgt telephoned patient's spouse. No answer. VM not setup. 1015:  Case mgt spoke with patient's spouse. She is agreeable to dc date and plan. Agreeable to [facility] if they're in network. Concerned with out of pocket costs for [facility] pt/ot/RN. She's aware the silva is out and the patient has voided. She has her grandson today and can't attend this afternoon's therapy session. Caregiver training 5/26 @7562. Raymundo Mark therapy coord notified. Spouse will provide dc transport. Patient provided with list of Medicare participating [facility] in the geographic area of the patient served. Patient selected [facility] and was provided with a comparative data handout from [address] [place] [address] website. The patient (and/or Family) was educated on the quality outcomes for each provider. Patient (and/or Family) demonstrated understanding. Per patient/family request, referral made to [facility]. List provided verbally by phone. Patient referred to [facility] via perfect serve    DME orders faxed to Zuleima lcoud at [facility].

## 2022-05-25 NOTE — PROGRESS NOTES
Physical Rehabilitation: OCCUPATIONAL THERAPY     [x] daily progress note       [] discharge       Patient Name:  Liv Ochoa   :  1945 MRN: [id number]  Room:  [address] [place] [address] Date of Admission: 2022  Rehabilitation Diagnosis:   Hepatic failure, unspecified with coma [K72.91]  Hepatic encephalopathy ([facility] [address] [K72.90]       Date 2022       Day of ARU Week:  1   Time IN//815   Individual Tx Minutes 60   Group Tx Minutes    Co-Treat Minutes    Concurrent Tx Minutes    TOTAL Tx Time Mins 60   Variance Time    Variance Time []   Refusal due to:     []   Medical hold/reason:    []   Illness   []   Off Unit for test/procedure  []   Extra time needed to complete task  []   Therapeutic need  []   Other (specify):   Restrictions Restrictions/Precautions: Fall Risk,General Precautions         Communication with other providers: [x]   OK to see per nursing:     []   Spoke with team member regarding:      Subjective observations and cognitive status: Patient sleeping upon approach, easily awakens, somewhat confused at beginning of treatment, becomes more aware as session continues; patient had silva catheter removed day prior to this session per nursing       Pain level/location:    /10       Location: no pain per patient    Discharge recommendations  Anticipated discharge date:  TBD  Destination: []home alone   []home alone w assist prn   [x] home w/ family    [] Continuous supervision       []SNF    [] Assisted living     [] Other:   Continued therapy: [x]HHC OT  []OUTPATIENT  OT   [] No Further OT  Equipment needs: TBD       ADLs:    Eating: Eating  Assistance Needed: Independent  Comment: MOd I patient able to open all containers on tray, cut food, and self feed  CARE Score: 6  Discharge Goal: Independent       UB/LB Bathing: Shower/Bathe Self  Assistance Needed: Supervision or touching assistance  Comment: Sup  heavy verbal for initiating and staying on task and sequencing bathes sinkside declines full shower  CARE Score: 4  Discharge Goal: Independent    UB Dressing: Upper Body Dressing  Assistance Needed: Supervision or touching assistance  Comment: Sup, Max verbal cues to orient shirt and sequencing  CARE Score: 4  Discharge Goal: Independent         LB Dressing: Lower Body Dressing  Assistance Needed: Supervision or touching assistance  Comment: Sup heavy cues for orienting undergarment and pants (silva cath removed)  CARE Score: 4  Discharge Goal: Independent    Donning and [place] Footwear: Putting On/Taking Off Footwear  Assistance Needed: Setup or clean-up assistance  Comment: Set up  CARE Score: 5  Discharge Goal: Independent      Toiletin [address] needed: Supervision or touching assistance  Comment: SBA in stance to manage depends and shorts. CARE Score: 4  Discharge Goal: Independent     Declines need     Toilet Transfers: Toilet Transfer  Assistance needed: Supervision or touching assistance  Comment: SBA-Sup with 2WW and grab bars  CARE Score: 4  Discharge Goal: Independent  Device Used:    []   Standard Toilet         []   Grab Bars           []  Bedside Commode       []   Elevated Toilet          []   Other:      NA      Bed Mobility:           []   Pt received out of bed   Rolling R/L:    Scooting:   Mod I   Supine --> Sit:  Mod I   Sit --> Supine:      Transfers:    Sit--> Stand:  Sup/SB  Stand --> Sit:   Sup  Stand-Pivot:   Sup/SBA   Other:    Assistive device required for transfer:   RW      Functional Mobility:  Room/bathroom   Assistance:  CGA  Device:   [x]   Rolling Walker     []   Standard Walker []   Wheelchair        []   [employer]       []   4-Wheeled Rosy Gambler         []   Cardiac Walker       []   Other:        Additional Therapeutic activities/exercises completed this date:     [x]   ADL Training   [x]   Balance/Postural training     [x]   Bed/Transfer Training   []   Endurance Training   []   Neuromuscular Re-ed   []   Nu-step:  Time:        Level: #Steps:       []   Rebounder:    []  Seated     []  Standing        []   Supine Ther Ex (reps/sets):     []   Seated Ther Ex (reps/sets):     []   Standing Ther Ex (reps/sets):     []   Other:      Comments:      Patient/Caregiver Education and Training:   [x]   [employer] Equipment Use  [x]   Bed Mobility/Transfer Technique/Safety  [x]   Energy Conservation Tips  []   Family training  [x]   Postural Awareness  [x]   Safety During Functional Activities  []   Reinforced Patient's Precautions   []   Progress was updated and reviewed in Rehabtracker with patient and/or family this         date. Treatment Plan for Next Session: POC to continue as tolerated         Treatment/Activity Tolerance:   [x] Tolerated treatment with no adverse effects    [] Patient limited by fatigue  [] Patient limited by pain   [] Patient limited by medical complications:    [] Adverse reaction to Tx:   [] Significant change in status    Safety:       []  bed alarm set    [x]  chair alarm set    []  Pt refused alarms                [x]  Telesitter activated      [x]  Gait belt used during tx session      []other:       Number of Minutes/Billable Intervention  Therapeutic Exercise    ADL Self-care 45   Neuro Re-Ed    Therapeutic Activity 15   Group    Other:    TOTAL 60       Social History  Social/Functional History  Lives With: Spouse  Type of Home: House  Home Layout: One level  Home Access: Stairs to enter with rails  Entrance Stairs - Number of Steps: 4 CORAZON front of the home and 5 CORAZON at the back of the home. HRs c both sets of stairs. Entrance Stairs - Rails: Both  Bathroom Shower/Tub: Walk-in shower  Bathroom Toilet: Standard  Bathroom Equipment:  (Per Pt's spouse, Pt has no equipment in the home.)  Bathroom Accessibility: Accessible  Has the patient had two or more falls in the past year or any fall with injury in the past year?: Yes (Pt reports 2 falls in the last year--fall when pulled by dog.  Second fall on back porch due to LOB and stairs.)  ADL Assistance: Independent  Homemaking Assistance: Needs assistance  Homemaking Responsibilities: Yes  Meal Prep Responsibility: No (Wife completes.)  Laundry Responsibility: No (Wife completes.)  Cleaning Responsibility: No (Wife completes.)  Bill Paying/Finance Responsibility: No (Wife completes.)  Shopping Responsibility: No (Wife completes.)  Health Care Management: Primary (Pt manages medications c 7-day pillbox.)  Ambulation Assistance: Independent  Transfer Assistance: Independent  Active : Yes  Mode of Transportation: Car,Truck,Motorcycle  Education: 8th grade; some difficulty in school  Occupation: Retired  Type of Occupation:   Leisure & Hobbies: Likes to work on cars, cats and a [place]er monika, wife does grocery shopping, out to eat. Pt manages medications c 7-day pillbox. Wife manages bills. Pt manages yardwork  IADL Comments: Likes to surf the internet  Additional Comments: Pt reports 2 falls in the last year--fall when pulled by dog. Second fall on back porch due to LOB and stairs. Objective                                                                                    Goals:  (Update in navigator)  Short Term Goals  Time Frame for Short term goals: STGs=LTGs:  Long Term Goals  Time Frame for Long term goals : 7-10 days or until d/c. Long Term Goal 1: Pt will self-feed c Ind. Long Term Goal 2: Pt will complete grooming tasks c Mod I.  Long Term Goal 3: Pt will complete total body bathing c AE PRN and Mod I.  Long Term Goal 4: Pt will complete UB dressing c Ind. Long Term Goal 5: Pt will complete LB dressing c AE PRN and Mod I.   Additional Goals?: Yes  Long Term Goal 6: Pt will doff/don footwear c AE PRN and Mod I.  Long Term Goal 7: Pt will complete toileting c Mod I.  Long Term Goal 8: Pt will perform functional transfers (bed, chair, toilet, shower) c DME PRN and Mod I.  Long Term Goal 9: Pt will perform therex/therax to facilitate an increase in strength/endurance/ax tolerance (c emphasis on dynamic standing balance/tolerance > 8 mins) c Mod I.  Long Term Goal 10: Pt will complete oral hygiene c Mod I.:        Plan of Care                                                                              Times per week: 5 days per week for a minimum of 60 minutes/day plus group as appropriate for 60 minutes.   Treatment to include Plan  Times per Day: Daily  Current Treatment Recommendations: Strengthening,Balance training,Functional mobility training,Endurance training,Pain management,Safety education & training,Patient/Caregiver education & training,Self-Care / ADL,Home management training,Neuromuscular re-education,Cognitive reorientation,Equipment evaluation, education, & procurement,Coordination training,Cognitive/Perceptual training    Electronically signed by   NICHOLAS Liriano,  5/25/2022, 8:29 AM

## 2022-05-25 NOTE — CARE COORDINATION
Patient reviewed at today's care conference. Patient is now supervision-cga and is unlikely to progress beyond this level. Patient is limited by silva (voiding trial anticipated) and o2 line mgt. Patient will likely need continuous home o2 - eval needed. RW/SC needed. [facility] pt/ot/RN. No SLP. Discharge 5/27. Caregiver training requested.

## 2022-05-25 NOTE — PROGRESS NOTES
Brianne Mohr    : 1945  Acct #: [de-identified]  MRN: [id number]              PM&R Progress Note      Admitting diagnosis: Hepatic encephalopathy ( [address])     Comorbid diagnoses impacting rehabilitation: Generalized weakness, gait disturbance, dysphagia, acute respiratory failure with hypoxia, pleural effusions, alcoholic cirrhosis, acute on chronic kidney disease, hypothyroidism, obesity (BMI 37.3)     Chief complaint: He denied any dysuria but he did have some incontinence after his Schilling was removed. Prior (baseline) level of function: Independent. Current level of function:         Current  IRF-VAMSI and Goals:   Occupational Therapy:    Short Term Goals  Time Frame for Short term goals: STGs=LTGs :   Long Term Goals  Time Frame for Long term goals : 7-10 days or until d/c. Long Term Goal 1: Pt will self-feed c Ind. Long Term Goal 2: Pt will complete grooming tasks c Mod I.  Long Term Goal 3: Pt will complete total body bathing c AE PRN and Mod I.  Long Term Goal 4: Pt will complete UB dressing c Ind. Long Term Goal 5: Pt will complete LB dressing c AE PRN and Mod I. Additional Goals?: Yes  Long Term Goal 6: Pt will doff/don footwear c AE PRN and Mod I.  Long Term Goal 7: Pt will complete toileting c Mod I.  Long Term Goal 8: Pt will perform functional transfers (bed, chair, toilet, shower) c DME PRN and Mod I.  Long Term Goal 9: Pt will perform therex/therax to facilitate an increase in strength/endurance/ax tolerance (c emphasis on dynamic standing balance/tolerance > 8 mins) c Mod I.  Long Term Goal 10:  Pt will complete oral hygiene c Mod I. :                                       Eating: Eating  Assistance Needed: Independent  Comment: MOd I patient able to open all containers on tray, cut food, and self feed  CARE Score: 6  Discharge Goal: Independent       Oral Hygiene: Oral Hygiene  Assistance Needed: Supervision or touching assistance  Comment: Pt required verbal cues for sequencing as Pt tried to squeeze toothpaste onto brush before removing cap. CARE Score: 4  Discharge Goal: Independent    UB/LB Bathing: Shower/Bathe Self  Assistance Needed: Supervision or touching assistance  Comment: Sup  heavy verbal for initiating and staying on task and sequencing bathes sinkside declines full shower  CARE Score: 4  Discharge Goal: Independent    UB Dressing: Upper Body Dressing  Assistance Needed: Supervision or touching assistance  Comment: Sup, Max verbal cues to orient shirt and sequencing  CARE Score: 4  Discharge Goal: Independent         LB Dressing: Lower Body Dressing  Assistance Needed: Supervision or touching assistance  Comment: Sup heavy cues for orienting undergarment and pants (silva cath removed)  CARE Score: 4  Discharge Goal: Independent    Donning and [place] Footwear: Putting On/Taking Off Footwear  Assistance Needed: Setup or clean-up assistance  Comment: Set up  CARE Score: 5  Discharge Goal: Independent      Toiletin [address] needed: Supervision or touching assistance  Comment: SBA in stance to manage depends and shorts. CARE Score: 4  Discharge Goal: Independent      Toilet Transfers: Toilet Transfer  Assistance needed: Supervision or touching assistance  Comment: SBA-Sup with 2WW and grab bars  CARE Score: 4  Discharge Goal: Independent    Physical Therapy:         Long Term Goals  Time Frame for Long term goals : 7-10 tx days:  Long term goal 1: Pt will complete bed mobility (scooting, rolling R/L, and sup<->sit) Ind.  Long term goal 2: Pt will complete OOB transfers Mod Ind-Ind. Long term goal 3: Pt will ambulate >/=150 ft over level surface and at least 10 ft of uneven surface using 2WW Mod Ind. Long term goal 4: Pt will ascend/descend curb step using 2WW and 1 flight of stairs using railings Mod Ind. Long term goal 5: Pt will complete object retrieval from the floor with 2WW and reacher Mod Ind.       Bed Mobility:   Sit to Lying  Assistance Needed: Supervision or touching assistance  Comment: SBA-Sup (required assist on silva catheter mannagement)  CARE Score: 4  Discharge Goal: Independent  Roll Left and Right  Assistance Needed: Supervision or touching assistance  Comment: SBA-Sup without use of bed features; required additional assist on silva catherer management prior to rolling to R as silva catheter pre-positioned on LLE  CARE Score: 4  Discharge Goal: Independent  Lying to Sitting on Side of Bed  Assistance Needed: Supervision or touching assistance  Comment: SBA without use of bed features towards L side of bed where silva catheter is pre-positioned; reports mild lightneadedness upon sitting up but no trunk swaying observed  CARE Score: 4  Discharge Goal: Independent    Transfers:    Sit to Stand  Assistance Needed: Independent  Comment: Mod Ind with 2WW  CARE Score: 6  Discharge Goal: Independent  Chair/Bed-to-Chair Transfer  Assistance Needed: Independent  Comment: Mod Ind with 2WW  CARE Score: 6  Discharge Goal: Independent  Toilet Transfer  Assistance needed: Supervision or touching assistance  Comment: SBA-Sup with 2WW and grab bars  CARE Score: 4  Car Transfer  Assistance Needed: Independent  Comment: Mod Ind with 2WW  CARE Score: 6  Discharge Goal: Independent    Ambulation:    Walking Ability  Does the Patient Walk?: Yes     Walk 10 Feet  Assistance Needed: Independent  Comment: Mod Ind with 2WW  CARE Score: 6  Discharge Goal: Independent     Walk 50 Feet with Two Turns  Assistance Needed: Independent  Comment: Mod Ind with 2WW  CARE Score: 6  Discharge Goal: Independent     Walk 150 Feet  Assistance Needed: Independent  Comment:  Mod Ind with 2WW  CARE Score: 6  Discharge Goal: Independent     Walking 10 Feet on Uneven Surfaces  Assistance Needed: Supervision or touching assistance  Comment: SBA-Sup (pt with tendency to lift and carry 2WW while ambulating over outdoor surfaces requiring verbal cues for safety)  CARE Score: 4  Discharge Goal: Independent     1 Step (Curb)  Assistance Needed: Supervision or touching assistance  Comment: SBA using 2WW  CARE Score: 4  Discharge Goal: Independent     4 Steps  Assistance Needed: Independent  Comment: Mod Ind with railings (usually witu step-through pattern to ascend/descend 4\"/6\" step heights)  CARE Score: 6  Discharge Goal: Independent     12 Steps  Assistance Needed: Independent  Comment: Mod Ind with railings (usually witu step-through pattern to ascend/descend 4\"/6\" step heights)  CARE Score: 6  Discharge Goal: Independent       Wheelchair:  w/c Ability: Wheelchair Ability  Uses a Wheelchair and/or Scooter?: No                Balance:        Object: Picking Up Object  Assistance Needed: Supervision or touching assistance  Comment: CGA with LUE support on 2WW without use of reacher (task was more automatic but had mild standing balance deficit); SBA-Sup with 2WW and reacher (pt had decreased problem solving requiring increase verbal cues on proper use of adaptive equipment)  CARE Score: 4  Discharge Goal: Independent    I      Exam:    Blood pressure (!) 119/58, pulse 59, temperature 97.5 °F (36.4 °C), temperature source Oral, resp. rate 18, height 5' 7.99\" (1.727 m), weight 239 lb 10.2 oz (108.7 kg), SpO2 91 %. General: Lying back in bed. Easily awakened. Oriented x2-3. In no distress. HEENT: MMM. No JVD. Gazing right and left. Soft-spoken. Pulmonary: Respirations are mildly restricted under the girth of his abdomen. No wheezes or rales noted. Cardiac: Regular rate and rhythm. Abdomen: Patient's abdomen is soft and nondistended. Bowel sounds were present throughout. There was no rebound, guarding or masses noted. Upper extremities: Spontaneously brings both hands together in the midline. To command he can raise them overhead. Functional  strength. No tremor. Lower extremities: No signs of DVT. Trace edema. Calf soft. Sitting balance was good.   Standing balance was fair-.    Lab Results   Component Value Date    WBC 9.5 05/23/2022    HGB 13.3 (L) 05/23/2022    HCT 41.1 (L) 05/23/2022    MCV 99.0 05/23/2022     05/23/2022     Lab Results   Component Value Date    INR 1.12 05/18/2022    INR 1.08 05/17/2022    INR 1.12 05/15/2022    PROTIME 14.4 05/18/2022    PROTIME 13.9 05/17/2022    PROTIME 14.4 05/15/2022     Lab Results   Component Value Date    CREATININE 1.5 (H) 05/23/2022    BUN 31 (H) 05/23/2022     05/23/2022    K 5.1 05/23/2022    CL 98 (L) 05/23/2022    CO2 30 05/23/2022     Lab Results   Component Value Date    ALT 32 05/23/2022    AST 41 (H) 05/23/2022    ALKPHOS 128 05/23/2022    BILITOT 0.6 05/23/2022       Expected length of stay  prior to a supervised level of function for discharge home with a walker and [facility] OT/PT is 5/27/2022. Recommendations:      1. Hepatic encephalopathy with gait disturbance: More alert but still easily distracted. Demonstrating some benefit from participating in the daily occupational and physical therapy with speech-language pathology.  His recent ammonia level was normal.  His recent deterioration in alertness was likely sleep deprivation. Electrolytes within normal limits. Urinalysis and culture are normal.  Benefiting from the adaptive equipment training, caregiver education and DVT prophylaxis.     Focusing on caregiver training to get him home with his wife this week. Verbal cues and CGA for transfers today. 2. DVT prophylaxis: Lovenox 40 mg subcu daily.  Weightbearing activities have been  slowly improving daily. Recent hemoglobin was improving.  GI prophylaxis is available.  No clinical signs of blood loss.   3. Acute respiratory failure with hypoxia:  Less coughing and no dyspnea today. General fatigue, however. Management of the liver disease that is causing the effusion buildup.  Bronchodilators periodically as needed.  Monitoring his oxygen levels at rest and with activity.   4. Acute on chronic kidney disease: Avoiding wide swings of blood pressure.  Avoiding nephrotoxic medications when possible.  Nephrology to monitor his recovery.  Periodic monitoring of his chemistries. Voiding trial proceeding very well.    5. Alcoholic cirrhosis: Xifaxan and Chronulac.  Abstinence from alcohol.  Avoiding acetaminophen.  Diuretics for the ascites.  GI to manage as an outpatient.

## 2022-05-26 ENCOUNTER — TELEPHONE (OUTPATIENT)
Dept: GASTROENTEROLOGY | Age: 77
End: 2022-05-26

## 2022-05-26 ENCOUNTER — TELEPHONE (OUTPATIENT)
Dept: FAMILY MEDICINE CLINIC | Age: 77
End: 2022-05-26

## 2022-05-26 LAB
GLUCOSE BLD-MCNC: 113 MG/DL (ref 70–99)
GLUCOSE BLD-MCNC: 114 MG/DL (ref 70–99)
GLUCOSE BLD-MCNC: 98 MG/DL (ref 70–99)

## 2022-05-26 PROCEDURE — 94150 VITAL CAPACITY TEST: CPT

## 2022-05-26 PROCEDURE — 99232 SBSQ HOSP IP/OBS MODERATE 35: CPT | Performed by: PHYSICAL MEDICINE & REHABILITATION

## 2022-05-26 PROCEDURE — 97530 THERAPEUTIC ACTIVITIES: CPT

## 2022-05-26 PROCEDURE — 6370000000 HC RX 637 (ALT 250 FOR IP): Performed by: HOSPITALIST

## 2022-05-26 PROCEDURE — 97116 GAIT TRAINING THERAPY: CPT

## 2022-05-26 PROCEDURE — 97129 THER IVNTJ 1ST 15 MIN: CPT

## 2022-05-26 PROCEDURE — 97535 SELF CARE MNGMENT TRAINING: CPT

## 2022-05-26 PROCEDURE — 1280000000 HC REHAB R&B

## 2022-05-26 PROCEDURE — 94761 N-INVAS EAR/PLS OXIMETRY MLT: CPT

## 2022-05-26 PROCEDURE — 82962 GLUCOSE BLOOD TEST: CPT

## 2022-05-26 PROCEDURE — 94618 PULMONARY STRESS TESTING: CPT

## 2022-05-26 PROCEDURE — 97130 THER IVNTJ EA ADDL 15 MIN: CPT

## 2022-05-26 PROCEDURE — 6360000002 HC RX W HCPCS: Performed by: HOSPITALIST

## 2022-05-26 RX ORDER — FUROSEMIDE 40 MG/1
40 TABLET ORAL DAILY
Qty: 30 TABLET | Refills: 0 | Status: SHIPPED | OUTPATIENT
Start: 2022-05-27 | End: 2022-07-12 | Stop reason: SDUPTHER

## 2022-05-26 RX ADMIN — LEVOTHYROXINE SODIUM 50 MCG: 0.05 TABLET ORAL at 05:41

## 2022-05-26 RX ADMIN — LACTULOSE 20 G: 10 SOLUTION ORAL at 05:41

## 2022-05-26 RX ADMIN — RIFAXIMIN 550 MG: 550 TABLET ORAL at 20:44

## 2022-05-26 RX ADMIN — SPIRONOLACTONE 100 MG: 50 TABLET ORAL at 08:12

## 2022-05-26 RX ADMIN — FUROSEMIDE 40 MG: 40 TABLET ORAL at 08:11

## 2022-05-26 RX ADMIN — PANTOPRAZOLE SODIUM 40 MG: 40 TABLET, DELAYED RELEASE ORAL at 05:41

## 2022-05-26 RX ADMIN — ENOXAPARIN SODIUM 40 MG: 100 INJECTION SUBCUTANEOUS at 08:12

## 2022-05-26 RX ADMIN — MAGNESIUM OXIDE 400 MG (241.3 MG MAGNESIUM) TABLET 200 MG: TABLET at 20:45

## 2022-05-26 RX ADMIN — MAGNESIUM OXIDE 400 MG (241.3 MG MAGNESIUM) TABLET 200 MG: TABLET at 08:12

## 2022-05-26 RX ADMIN — NADOLOL 40 MG: 20 TABLET ORAL at 08:11

## 2022-05-26 RX ADMIN — LACTULOSE 20 G: 10 SOLUTION ORAL at 14:12

## 2022-05-26 RX ADMIN — RIFAXIMIN 550 MG: 550 TABLET ORAL at 08:11

## 2022-05-26 RX ADMIN — DULOXETINE HYDROCHLORIDE 60 MG: 30 CAPSULE, DELAYED RELEASE ORAL at 20:44

## 2022-05-26 RX ADMIN — LACTULOSE 20 G: 10 SOLUTION ORAL at 20:46

## 2022-05-26 ASSESSMENT — PAIN SCALES - GENERAL: PAINLEVEL_OUTOF10: 0

## 2022-05-26 NOTE — PROGRESS NOTES
Physical Rehabilitation: OCCUPATIONAL THERAPY     [x] daily progress note       [] discharge       Patient Name:  Brianne Mohr   :  1945 MRN: [id number]  Room:  [address] [place] [address] Date of Admission: 2022  Rehabilitation Diagnosis:   Hepatic failure, unspecified with coma [K72.91]  Hepatic encephalopathy ([facility] [address] [K72.90]       Date 2022       Day of ARU Week:  2   Time IN//825   Individual Tx Minutes 75   Group Tx Minutes    Co-Treat Minutes    Concurrent Tx Minutes    TOTAL Tx Time Mins 75   Variance Time    Variance Time []   Refusal due to:     []   Medical hold/reason:    []   Illness   []   Off Unit for test/procedure  []   Extra time needed to complete task  []   Therapeutic need  []   Other (specify):   Restrictions Restrictions/Precautions: Fall Risk,General Precautions         Communication with other providers: [x]   OK to see per nursing:     []   Spoke with team member regarding:      Subjective observations and cognitive status: Patient side lying in bed upon approach, pleasant and agreeable to full shower      Pain level/location:    /10       Location: per patient no pain noted    Discharge recommendations  Anticipated discharge date:    Destination: []home alone   []home alone w assist prn   [x] home w/ family    [] Continuous supervision       []SNF    [] Assisted living     [] Other:   Continued therapy: [x]HHC OT  []OUTPATIENT  OT   [] No Further OT  Equipment needs: Brianne Mohr requires the assistance of a shower chair to successfully and safely complete bathing activities necessary due to reduced balance, endurance, need for ADL assist, and LE weakness and reduced ROM. These tasks cannot be safely completed without this device and would place Brianne Mohr at greater risk of falls.        ADLs:    Eating: Eating  Assistance Needed: Independent  Comment: X  CARE Score: 6  Discharge Goal: Independent       Oral Hygiene: Oral Hygiene  Assistance Needed: Independent  Comment: Mod I seated sinkside  CARE Score: 6  Discharge Goal: Independent    UB/LB Bathing: Shower/Bathe Self  Assistance Needed: Supervision or touching assistance  Comment: Sup for safety in stance,  mod cues for safety awareness, cues to use shower bench when appropriate  CARE Score: 4  Discharge Goal: Independent    UB Dressing: Upper Body Dressing  Assistance Needed: Independent  Comment: X  CARE Score: 6  Discharge Goal: Independent         LB Dressing: Lower Body Dressing  Assistance Needed: Independent  Comment: Mod I  CARE Score: 6  Discharge Goal: Independent    Donning and [place] Footwear: Putting On/Taking Off Footwear  Assistance Needed: Independent  Comment: X  CARE Score: 6  Discharge Goal: Independent      Toileting: Toileting Hygiene  Assistance needed: Independent  Comment: Mod I  CARE Score: 6  Discharge Goal: Independent      Toilet Transfers: Toilet Transfer  Assistance needed: Independent  Comment: Mod I  CARE Score: 6  Discharge Goal: Independent  Device Used:    [x]   Standard Toilet         [x]   Grab Bars           []  Bedside Commode       []   Elevated Toilet          []   Other:        Bed Mobility:           []   Pt received out of bed   Rolling R/L:  I  Scooting:  I  Supine --> Sit:  I  Sit --> Supine:      Transfers:    Sit--> Stand: Mod I   Stand --> Sit:   Mod I   Stand-Pivot: Mod I   Other:    Assistive device required for transfer:   RW      Functional Mobility:  Throughout room/bathroom   Assistance:  Mod I   Device:   [x]   Rolling Walker     []   Standard Walker []   Wheelchair        []   Porfirio Cano       []   4-Wheeled [place]         []   Cardiac [place]       []   Other:        Homemaking Tasks:   Patient retrieves clothing from closet and transports at [address] [place] [address]       Additional Therapeutic activities/exercises completed this date:     [x]   ADL Training   [x]   Balance/Postural training     [x]   Bed/Transfer Training   []   Endurance Training   [] Neuromuscular Re-ed   []   Nu-step:  Time:        Level:         #Steps:       []   Rebounder:    []  Seated     []  Standing        []   Supine Ther Ex (reps/sets):     []   Seated Ther Ex (reps/sets):     []   Standing Ther Ex (reps/sets):     []   Other:      Comments:  Therapist changes approach for full ADL this date, using short, to the point instruction when needed, patient able to manage ADL with increased independence c fewer cues given      Patient/Caregiver Education and Training:   [x]   Adaptive Equipment Use  [x]   Bed Mobility/Transfer Technique/Safety  [x]   Energy Conservation Tips  []   Family training  [x]   Postural Awareness  [x]   Safety During Functional Activities  []   Reinforced Patient's Precautions   []   Progress was updated and reviewed in Rehabtracker with patient and/or family this         date. Treatment Plan for Next Session: POC to continue as tolerated          Treatment/Activity Tolerance:   [x] Tolerated treatment with no adverse effects    [] Patient limited by fatigue  [] Patient limited by pain   [] Patient limited by medical complications:    [] Adverse reaction to Tx:   [] Significant change in status    Safety:       []  bed alarm set    [x]  chair alarm set    []  Pt refused alarms                [x]  Telesitter activated      [x]  Gait belt used during tx session      []other:       Number of Minutes/Billable Intervention  Therapeutic Exercise    ADL Self-care 60   Neuro Re-Ed    Therapeutic Activity 15   Group    Other:    TOTAL 75       Social History  Social/Functional History  Lives With: Spouse  Type of Home: House  Home Layout: One level  Home Access: Stairs to enter with rails  Entrance Stairs - Number of Steps: 4 CORAZON front of the home and 5 [place]AZON at the back of the home. HRs c both sets of stairs.   Entrance Stairs - Rails: Both  Bathroom Shower/Tub: Walk-in shower  Bathroom Toilet: Standard  Bathroom Equipment:  (Per Pt's spouse, Pt has no equipment in the home.)  Bathroom Accessibility: Accessible  Has the patient had two or more falls in the past year or any fall with injury in the past year?: Yes (Pt reports 2 falls in the last year--fall when pulled by dog. Second fall on back porch due to LOB and stairs.)  ADL Assistance: Independent  Homemaking Assistance: Needs assistance  Homemaking Responsibilities: Yes  Meal Prep Responsibility: No (Wife completes.)  Laundry Responsibility: No (Wife completes.)  Cleaning Responsibility: No (Wife completes.)  Bill Paying/Finance Responsibility: No (Wife completes.)  Shopping Responsibility: No (Wife completes.)  Health Care Management: Primary (Pt manages medications c 7-day pillbox.)  Ambulation Assistance: Independent  Transfer Assistance: Independent  Active : Yes  Mode of Transportation: Car,Truck,Motorcycle  Education: 8th grade; some difficulty in school  Occupation: Retired  Type of Occupation:   Leisure & Hobbies: Likes to work on cars, cats and a Kisw[place]ili monika, wife does grocery shopping, out to eat. Pt manages medications c 7-day pillbox. Wife manages bills. Pt manages yardwork  IADL Comments: Likes to surf the internet  Additional Comments: Pt reports 2 falls in the last year--fall when pulled by dog. Second fall on back porch due to LOB and stairs. Objective                                                                                    Goals:  (Update in navigator)  Short Term Goals  Time Frame for Short term goals: STGs=LTGs:  Long Term Goals  Time Frame for Long term goals : 7-10 days or until d/c. Long Term Goal 1: Pt will self-feed c Ind. Long Term Goal 2: Pt will complete grooming tasks c Mod I.  Long Term Goal 3: Pt will complete total body bathing c AE PRN and Mod I.  Long Term Goal 4: Pt will complete UB dressing c Ind. Long Term Goal 5: Pt will complete LB dressing c AE PRN and Mod I.   Additional Goals?: Yes  Long Term Goal 6: Pt will doff/don footwear c AE PRN and Mod I.  Long Term Goal 7: Pt will complete toileting c Mod I.  Long Term Goal 8: Pt will perform functional transfers (bed, chair, toilet, shower) c DME PRN and Mod I.  Long Term Goal 9: Pt will perform therex/therax to facilitate an increase in strength/endurance/ax tolerance (c emphasis on dynamic standing balance/tolerance > 8 mins) c Mod I.  Long Term Goal 10: Pt will complete oral hygiene c Mod I.:        Plan of Care                                                                              Times per week: 5 days per week for a minimum of 60 minutes/day plus group as appropriate for 60 minutes.   Treatment to include Plan  Times per Day: Daily  Current Treatment Recommendations: Strengthening,Balance training,Functional mobility training,Endurance training,Pain management,Safety education & training,Patient/Caregiver education & training,Self-Care / ADL,Home management training,Neuromuscular re-education,Cognitive reorientation,Equipment evaluation, education, & procurement,Coordination training,Cognitive/Perceptual training    Electronically signed by   Treasure Sever, OTA,  5/26/2022, 8:14 AM

## 2022-05-26 NOTE — PROGRESS NOTES
Lucy Peña    : 1945  Acct #: [de-identified]  MRN: [id number]              PM&R Progress Note      Admitting diagnosis: Hepatic encephalopathy ( [place] [address])     Comorbid diagnoses impacting rehabilitation: Generalized weakness, gait disturbance, dysphagia, acute respiratory failure with hypoxia, pleural effusions, alcoholic cirrhosis, acute on chronic kidney disease, hypothyroidism, obesity (BMI 37.3)    Chief complaint: Ready to go home tomorrow. Mild dyspnea on exertion. No new abdominal pain or leg tingling. Prior (baseline) level of function: Independent. Current level of function:         Current  IRF-VAMSI and Goals:   Occupational Therapy:    Short Term Goals  Time Frame for Short term goals: STGs=LTGs :   Long Term Goals  Time Frame for Long term goals : 7-10 days or until d/c. Long Term Goal 1: Pt will self-feed c Ind. Long Term Goal 2: Pt will complete grooming tasks c Mod I.  Long Term Goal 3: Pt will complete total body bathing c AE PRN and Mod I.  Long Term Goal 4: Pt will complete UB dressing c Ind. Long Term Goal 5: Pt will complete LB dressing c AE PRN and Mod I. Additional Goals?: Yes  Long Term Goal 6: Pt will doff/don footwear c AE PRN and Mod I.  Long Term Goal 7: Pt will complete toileting c Mod I.  Long Term Goal 8: Pt will perform functional transfers (bed, chair, toilet, shower) c DME PRN and Mod I.  Long Term Goal 9: Pt will perform therex/therax to facilitate an increase in strength/endurance/ax tolerance (c emphasis on dynamic standing balance/tolerance > 8 mins) c Mod I.  Long Term Goal 10: Pt will complete oral hygiene c Mod I. :                                       Eating: Eating  Assistance Needed: Independent  Comment: X  CARE Score: 6  Discharge Goal: Independent       Oral Hygiene: Oral Hygiene  Assistance Needed: Independent  Comment:  Mod I seated sinkside  CARE Score: 6  Discharge Goal: Independent    UB/LB Bathing: Shower/Bathe Self  Assistance Needed: Supervision or touching assistance  Comment: Sup for safety in stance,  mod cues for safety awareness, cues to use shower bench when appropriate  CARE Score: 4  Discharge Goal: Independent    UB Dressing: Upper Body Dressing  Assistance Needed: Independent  Comment: X  CARE Score: 6  Discharge Goal: Independent         LB Dressing: Lower Body Dressing  Assistance Needed: Independent  Comment: Mod I  CARE Score: 6  Discharge Goal: Independent    Donning and Joy Footwear: Putting On/Taking Off Footwear  Assistance Needed: Independent  Comment: X  CARE Score: 6  Discharge Goal: Independent      Toileting: Toileting Hygiene  Assistance needed: Independent  Comment: Mod I  CARE Score: 6  Discharge Goal: Independent      Toilet Transfers: Toilet Transfer  Assistance needed: Independent  Comment: Mod I  CARE Score: 6  Discharge Goal: Independent    Physical Therapy:         Long Term Goals  Time Frame for Long term goals : 7-10 tx days:  Long term goal 1: Pt will complete bed mobility (scooting, rolling R/L, and sup<->sit) Ind.  Long term goal 2: Pt will complete OOB transfers Mod Ind-Ind. Long term goal 3: Pt will ambulate >/=150 ft over level surface and at least 10 ft of uneven surface using 2WW Mod Ind. Long term goal 4: Pt will ascend/descend curb step using 2WW and 1 flight of stairs using railings Mod Ind. Long term goal 5: Pt will complete object retrieval from the floor with 2WW and reacher Mod Ind.       Bed Mobility:   Sit to Lying  Assistance Needed: Independent  Comment: Ind without use of bed features  CARE Score: 6  Discharge Goal: Independent  Roll Left and Right  Assistance Needed: Independent  Comment: Ind without use of bed features  CARE Score: 6  Discharge Goal: Independent  Lying to Sitting on Side of Bed  Assistance Needed: Independent  Comment: Ind without use of bed features  CARE Score: 6  Discharge Goal: Independent    Transfers:    Sit to Stand  Assistance Needed: Independent  Comment: Mod Ind with 2WW (usual) and even progressed to Ind  CARE Score: 6  Discharge Goal: Independent  Chair/Bed-to-Chair Transfer  Assistance Needed: Independent  Comment: Mod Ind with 2WW (usual) and even progressed to Ind  CARE Score: 6  Discharge Goal: Independent  Toilet Transfer  Assistance needed: Supervision or touching assistance  Comment: SBA-Sup with 2WW and grab bars  CARE Score: 4  Car Transfer  Assistance Needed: Independent  Comment: Mod Ind with 2WW  CARE Score: 6  Discharge Goal: Independent    Ambulation:    Walking Ability  Does the Patient Walk?: Yes     Walk 10 Feet  Assistance Needed: Independent  Comment: Mod Ind with 2WW  CARE Score: 6  Discharge Goal: Independent     Walk 50 Feet with Two Turns  Assistance Needed: Independent  Comment: Mod Ind with 2WW  CARE Score: 6  Discharge Goal: Independent     Walk 150 Feet  Assistance Needed: Independent  Comment: Mod Ind with 2WW  CARE Score: 6  Discharge Goal: Independent     Walking 10 Feet on Uneven Surfaces  Assistance Needed: Supervision or touching assistance  Comment: SBA with 2WW  CARE Score: 4  Discharge Goal: Independent     1 Step (Curb)  Assistance Needed: Supervision or touching assistance  Comment: Sup with 2WW (required sequential cues on AD mamagement for safety)  CARE Score: 4  Discharge Goal: Independent     4 Steps  Assistance Needed: Independent  Comment: Mod Ind with railings with consistent step-through pattern on ascent/descent of 6-7\" step height  CARE Score: 6  Discharge Goal: Independent     12 Steps  Assistance Needed: Independent  Comment: Mod Ind with railings with consistent step-through pattern on ascent/descent of 6-7\" step height  CARE Score: 6  Discharge Goal: Independent       Wheelchair:  w/c Ability: Wheelchair Ability  Uses a Wheelchair and/or Scooter?: No                Balance:        Object: Picking Up Object  Assistance Needed: Independent  Comment:  Mod Ind with 2WW and reacher  CARE Score: 6  Discharge Goal: Independent    I      Exam:    Blood pressure 135/66, pulse 65, temperature 98.2 °F (36.8 °C), temperature source Oral, resp. rate 22, height 5' 7.99\" (1.727 m), weight 240 lb 1.3 oz (108.9 kg), SpO2 94 %. General: Sitting up in a bedside chair. Legs elevated. Gazing about the room. In no distress. HEENT: Symmetric facial expression. Full visual field. MMM. No JVD. Pulmonary: Diminished breath sounds inferiorly. No wheezes or rales. Symmetric air exchange. Cardiac: Distant heart sounds with regular rate and rhythm. Abdomen: Patient's abdomen is soft and nondistended. Bowel sounds were present throughout. There was no rebound, guarding or masses noted. Upper extremities: Slow and deliberate with bringing his hands up to meet mine. Normal tone without new tremor or swelling. Lower extremities: 1+ edema. Heels clear. No signs of DVT. Sitting balance was good. Standing balance was fair-..    Lab Results   Component Value Date    WBC 9.5 05/23/2022    HGB 13.3 (L) 05/23/2022    HCT 41.1 (L) 05/23/2022    MCV 99.0 05/23/2022     05/23/2022     Lab Results   Component Value Date    INR 1.12 05/18/2022    INR 1.08 05/17/2022    INR 1.12 05/15/2022    PROTIME 14.4 05/18/2022    PROTIME 13.9 05/17/2022    PROTIME 14.4 05/15/2022     Lab Results   Component Value Date    CREATININE 1.5 (H) 05/23/2022    BUN 31 (H) 05/23/2022     05/23/2022    K 5.1 05/23/2022    CL 98 (L) 05/23/2022    CO2 30 05/23/2022     Lab Results   Component Value Date    ALT 32 05/23/2022    AST 41 (H) 05/23/2022    ALKPHOS 128 05/23/2022    BILITOT 0.6 05/23/2022       Expected length of stay  prior to a supervised level of function for discharge home with a walker and [facility] OT/PT is 5/27/2022. Recommendations:    1. Hepatic encephalopathy with gait disturbance: Caregiver training with his spouse is complete.   We are ready to transition to home care services including nursing tomorrow after discharge. His recent ammonia level was normal.  His recent deterioration in alertness was likely sleep deprivation. Electrolytes within normal limits.  Urinalysis and culture are normal.  Benefiting from the adaptive equipment training, caregiver education and DVT prophylaxis. Verbal cues and  SBA for transfers today. 2. DVT prophylaxis: Lovenox 40 mg subcu daily through tomorrow. Cosmo Campbell been  slowly improving daily.  Recent hemoglobin was improving.  GI prophylaxis is available.  No new bruising or swelling.   3. Acute respiratory failure with hypoxia:  Less coughing and no dyspnea today.  General fatigue, however.  Management of the liver disease that is causing the effusion buildup.  Bronchodilators periodically as needed.  Monitoring his oxygen levels at rest and with activity. 4. Acute on chronic kidney disease: Avoiding wide swings of blood pressure.  Avoiding nephrotoxic medications when possible.  Nephrology to monitor his recovery.  Periodic monitoring of his chemistries.  Voiding trial proceeding very well.    5. Alcoholic cirrhosis: Xifaxan and Chronulac.  Abstinence from alcohol.  Avoiding acetaminophen.  Diuretics for the ascites.  GI to manage as an outpatient.

## 2022-05-26 NOTE — PROGRESS NOTES
Comprehensive Nutrition Assessment    Type and Reason for Visit:  Reassess    Nutrition Recommendations/Plan:   1. Continue regular diet at this time   2. Continue to offer frozen oral nutrition supplement  3. Assist with meals as needed  4. Will continue to follow up during stay     Malnutrition Assessment:  Malnutrition Status:  Insufficient data (05/20/22 1212)    Context:  Acute Illness       Nutrition Assessment:    Meal intake usually % on regular diet. Likes frozen oral nutrition supplements. Noted refused dinner yesterday. Will continue to follow at moderate nutrition risk at this time. Nutrition Related Findings:    up in chair for lunch, PINEDA in room, hard of hearing,  not appropriate for diet ed,  POCT under 150 mg/dL Wound Type: None       Current Nutrition Intake & Therapies:    Average Meal Intake: 51-75%,%  Average Supplements Intake: 51-75%  ADULT DIET; Regular  ADULT ORAL NUTRITION SUPPLEMENT; Lunch, Dinner; Frozen Oral Supplement    Anthropometric Measures:  Height: 5' 7.99\" (172.7 cm)  Ideal Body Weight (IBW): 154 lbs (70 kg)       Current Body Weight: 240 lb 1.3 oz (108.9 kg), 155 % IBW.  Weight Source: Bed Scale  Current BMI (kg/m2): 36.5        Weight Adjustment For: No Adjustment                 BMI Categories: Obese Class 2 (BMI 35.0 -39.9)    Estimated Daily Nutrient Needs:  Energy Requirements Based On: Formula  Weight Used for Energy Requirements: Current  Energy (kcal/day): 5461-0937 ([facility] w/ stress factor 1.0-1.2)  Weight Used for Protein Requirements: Ideal  Protein (g/day): 84-98  Method Used for Fluid Requirements: 1 ml/kcal  Fluid (ml/day):      Nutrition Diagnosis:   · Inadequate oral intake related to acute injury/trauma as evidenced by  (varied po intake per documentation)      Nutrition Interventions:   Food and/or Nutrient Delivery: Continue Current Diet,Continue Oral Nutrition Supplement  Nutrition Education/Counseling: No recommendation at this time  Coordination of Nutrition Care: Continue to monitor while inpatient,Feeding Assistance/Environment Change       Goals:  Previous Goal Met: Progressing toward Goal(s)  Goals: PO intake 75% or greater,prior to discharge       Nutrition Monitoring and Evaluation:   Behavioral-Environmental Outcomes: None Identified  Food/Nutrient Intake Outcomes: Diet Advancement/Tolerance,Food and Nutrient Intake,Supplement Intake  Physical Signs/Symptoms Outcomes: Biochemical Data,Meal Time Behavior,Skin,Weight    Discharge Planning:    Continue current diet     Doe Ruelas RD, LD  Contact: [phone]

## 2022-05-26 NOTE — TELEPHONE ENCOUNTER
Palmira Contreras at case management called to schedule patient f/u with you. He is discharging tomorrow. When would you like to see him? She states 1 week but there are no appts available till mid July.

## 2022-05-26 NOTE — PROGRESS NOTES
Physical Therapy    [x] daily progress note       [x] discharge       Patient Name:  Reny Thakur   :  1945 MRN: [id number]  Room:  [address] [place] [address] Date of Admission: 2022  Rehabilitation Diagnosis:   Hepatic failure, unspecified with coma [K72.91]  Hepatic encephalopathy ([facility] [address] [K72.90]       Date 2022       Day of ARU Week:  2   Time IN//1052   Individual Tx Minutes 75   Group Tx Minutes    Co-Treat Minutes    Concurrent Tx Minutes    TOTAL Tx Time Mins 75   Variance Time    Variance Time []   Refusal due to:     []   Medical hold/reason:    []   Illness   []   Off Unit for test/procedure  []   Extra time needed to complete task  []   Therapeutic need  []   Other (specify):   Restrictions Restrictions/Precautions  Restrictions/Precautions: Fall Risk,General Precautions  Position Activity Restriction  Other position/activity restrictions: PINEDA, PICC line RUE, silva catheter, 2L O2   Interdisciplinary communication [x]   Cleared for therapy per nursing     []   RN notified about issues during session  []   RN updated on pt performance  [x]   Spoke with  about spouse's concerns related to discharge plan   []   Spoke with OT  []   Spoke with MD  []   Other:    Subjective observations and cognitive status: Pt resting in recliner with 2L O2 via nasal cannula initially and then was eventually progressed to room air throughout this PT session, alert and pleasant, spouse present for scheduled training/education. Pain level/location: None reported at rest and with activities    Discharge recommendations  Anticipated discharge date:  2022  Destination: []?home alone   []?home alone with assist PRN     [x]? home w/ family      []? Continuous supervision  []? SNF    []? Assisted living     []? Other:  Continued therapy: [x]? [facility]  []? OUTPATIENT PT   []? No Further PT  []? SNF PT  Caregiver training recommended: [x]? Yes  []?  No   Equipment needs: [address] requires the assistance of a front-wheeled walker to successfully ambulate from room to room at home to allow completion of daily living tasks such as: bathing, toileting, dressing and grooming. A wheeled walker is necessary due to the patient's unsteady gait, upper body weakness, inability to  a standard walker. This patient can ambulate only by pushing a walker instead of using a lesser assistive device such as a cane or crutch. PT IRF-VAMSI scores and goals for discharge assessment:   Roll Left and Right  Assistance Needed: Independent  Comment: Ind without use of bed features  CARE Score: 6  Discharge Goal: Independent    Sit to Lying  Assistance Needed: Independent  Comment: Ind without use of bed features  CARE Score: 6  Discharge Goal: Independent    Lying to Sitting on Side of Bed  Assistance Needed: Independent  Comment: Ind without use of bed features  CARE Score: 6  Discharge Goal: Independent    Sit to Stand  Assistance Needed: Independent  Comment: Mod Ind with 2WW (usual) and even progressed to Ind  CARE Score: 6  Discharge Goal: Independent    Chair/Bed-to-Chair Transfer  Assistance Needed: Independent  Comment: Mod Ind with 2WW (usual) and even progressed to Ind  CARE Score: 6  Discharge Goal: Independent    Toilet Transfer  Assistance needed: Independent  Comment: Mod I  CARE Score: 6  Discharge Goal: Independent    Car Transfer  Assistance Needed: Independent  Comment: Mod Ind with 2WW  CARE Score: 6  Discharge Goal: Independent    Walk 10 Feet? Walk 10 Feet?: Yes    1 Step  1 Step?: Yes    Picking Up Object  Assistance Needed: Independent  Comment: Mod Ind with 2WW and reacher  CARE Score: 6  Discharge Goal: Independent    Wheelchair Ability  Uses a Wheelchair and/or Scooter?: No      Walking Ability  Does the Patient Walk?: Yes    Walk 10 Feet  Assistance Needed: Independent  Comment:  Mod Ind with 2WW  CARE Score: 6  Discharge Goal: Independent    Walk 50 Feet with Two Turns  Assistance Needed: Independent  Comment: Mod Ind with 2WW  CARE Score: 6  Discharge Goal: Independent    Walk 150 Feet  Assistance Needed: Independent  Comment: Mod Ind with 2WW  CARE Score: 6  Discharge Goal: Independent    Walking 10 Feet on Uneven Surfaces  Assistance Needed: Supervision or touching assistance  Comment: SBA with 2WW  CARE Score: 4  Discharge Goal: Independent    1 Step (Curb)  Assistance Needed: Supervision or touching assistance  Comment: Sup with 2WW (required sequential cues on AD mamagement for safety)  CARE Score: 4  Discharge Goal: Independent    4 Steps  Assistance Needed: Independent  Comment: Mod Ind with railings with consistent step-through pattern on ascent/descent of 6-7\" step height  CARE Score: 6  Discharge Goal: Independent   Pt trained to ascend/descend 4 steps with R railing simulating home set-up and spouse was recommended to provide SBA for safety. 12 Steps  Assistance Needed: Independent  Comment: Mod Ind with railings with consistent step-through pattern on ascent/descent of 6-7\" step height  CARE Score: 6  Discharge Goal: Independent      Patient/Caregiver Education and Training:   []   Role of PT  []   Education about Dx  []   Use of call light for assist   []   HEP provided and explained   [x]   Treatment plan reviewed  [x]   Home safety  []   Wheelchair mobility/management   []   Body mechanics  []   Bed Mobility/Transfer technique  []   Gait technique/sequencing  []   Proper use of assistive device/adaptive equipment  []   Stair training/Advanced mobility safety and technique  []   Reinforced patient's precautions/mobility while maintaining precautions  []   Postural awareness  [x]   Family/caregiver training/education on pt's variable cognitive and pulmonary status that can affect his safety and mobility and recommendation for continued use of 2WW for long distance and community ambulation  [x]   Progress was reviewed with patient and family this date.   [x]   Other: Pt/caregiver education about benefits of continued walking regimen at home to prevent deconditioning and as a vital component of continued recovery. Treatment Plan for Next Session: discharge to home     Assessment: This pt has met all established PT goals. Spouse was receptive to education and training provided today and expressed understanding of all recommendations including benefits of [facility] to increase pt's safety and independence. Treatment/Activity Tolerance:   [x] Tolerated treatment with no adverse effects    [] Patient limited by fatigue  [] Patient limited by pain   [] Patient limited by medical complications:    [] Adverse reaction to Tx:   [] Significant change in status    Safety:       []  bed alarm set    [x]  chair alarm set    []  Pt refused alarms                []  Telesitter activated      [x]  Gait belt used during tx session      [x]other: spouse present in room        Number of Minutes/Billable Intervention  Gait Training 45   Therapeutic Exercise    Neuro Re-Ed    Therapeutic Activity 30   Wheelchair Propulsion    Group    Other:    TOTAL 75     Social History  Social/Functional History  Lives With: Spouse  Type of Home: House  Home Layout: One level  Home Access: Stairs to enter with rails  Entrance Stairs - Number of Steps: 4 CORAZON front of the home and 5 [place]AZON at the back of the home. HRs c both sets of stairs. Entrance Stairs - Rails: Both  Bathroom Shower/Tub: Walk-in shower  Bathroom Toilet: Standard  Bathroom Equipment:  (Per Pt's spouse, Pt has no equipment in the home.)  Bathroom Accessibility: Accessible  Has the patient had two or more falls in the past year or any fall with injury in the past year?: Yes (Pt reports 2 falls in the last year--fall when pulled by dog.  Second fall on back porch due to LOB and stairs.)  ADL Assistance: Independent  Homemaking Assistance: Needs assistance  Homemaking Responsibilities: Yes  Meal Prep Responsibility: No (Wife completes.)  Laundry Responsibility: No (Wife completes.)  Cleaning Responsibility: No (Wife completes.)  Bill Paying/Finance Responsibility: No (Wife completes.)  Shopping Responsibility: No (Wife completes.)  Health Care Management: Primary (Pt manages medications c 7-day pillbox.)  Ambulation Assistance: Independent  Transfer Assistance: Independent  Active : Yes  Mode of Transportation: Car,Truck,Motorcycle  Education: 8th grade; some difficulty in school  Occupation: Retired  Type of Occupation:   Leisure & Hobbies: Likes to work on cars, cats and a [place]ese monika, wife does grocery shopping, out to eat. Pt manages medications c 7-day pillbox. Wife manages bills. Pt manages yardwork  IADL Comments: Likes to surf the internet  Additional Comments: Pt reports 2 falls in the last year--fall when pulled by dog. Second fall on back porch due to LOB and stairs. Objective                                                                                    Goals:  (Update in navigator)   : Long Term Goals  Time Frame for Long term goals : 7-10 tx days:  Long term goal 1: Pt will complete bed mobility (scooting, rolling R/L, and sup<->sit) Ind.  Long term goal 2: Pt will complete OOB transfers Mod Ind-Ind. Long term goal 3: Pt will ambulate >/=150 ft over level surface and at least 10 ft of uneven surface using 2WW Mod Ind. Long term goal 4: Pt will ascend/descend curb step using 2WW and 1 flight of stairs using railings Mod Ind. Long term goal 5: Pt will complete object retrieval from the floor with 2WW and reacher Mod Ind.:        Plan of Care                                                                              Times per week: 5 days per week for a minimum of 60 minutes/day plus group as appropriate for 60 minutes.   Treatment to include Current Treatment Recommendations: Strengthening,Balance training,Functional mobility training,Transfer training,Cognitive/Perceptual training,Endurance training,Gait training,Stair training,Cognitive reorientation,Home exercise program,Safety education & training,Patient/Caregiver education & training,Equipment evaluation, education, & procurement,Therapeutic activities    Electronically signed by   Naida Brown PT  5/26/2022, 12:17 PM

## 2022-05-26 NOTE — PLAN OF CARE
Patient lying in bed with oxygen off, Sat 85% on room air. Placed oxygen back on at 2 liters per N/C. Patient neena needs. Call light in reach.

## 2022-05-26 NOTE — PROGRESS NOTES
[facility] - [facility] UNIT  SPEECH/LANGUAGE PATHOLOGY      [x] Daily           [x] Discharge    Patient:Boby Butt      SWU:[id number]  VLU:[id number]  Rehab Dx/Hx: Hepatic failure, unspecified with coma [K72.91]  Hepatic encephalopathy ([address] [K72.90]   Allergies   Allergen Reactions    Lisinopril Swelling     Tongue swelling      Zetia [Ezetimibe] Swelling     Facial swelling    Atorvastatin     Codeine Other (See Comments)     Blood pressure drops    Hydrochlorothiazide     Hydroxyzine      Fatigue and depressed    Lexapro [Escitalopram Oxalate]      Increased depression    Pravastatin      Precautions: ; Restrictions/Precautions: Fall Risk,General Precautions          Home Situation/IADL:   Social/Functional History  Lives With: Spouse  Type of Home: House  Home Layout: One level  Home Access: Stairs to enter with rails  Entrance Stairs - Number of Steps: 4 CORAZON front of the home and 5 CORAZON at the back of the home. HRs c both sets of stairs. Entrance Stairs - Rails: Both  Bathroom Shower/Tub: Walk-in shower  Bathroom Toilet: Standard  Bathroom Equipment:  (Per Pt's spouse, Pt has no equipment in the home.)  Bathroom Accessibility: Accessible  Has the patient had two or more falls in the past year or any fall with injury in the past year?: Yes (Pt reports 2 falls in the last year--fall when pulled by dog.  Second fall on back porch due to LOB and stairs.)  ADL Assistance: Independent  Homemaking Assistance: Needs assistance  Homemaking Responsibilities: Yes  Meal Prep Responsibility: No (Wife completes.)  Laundry Responsibility: No (Wife completes.)  Cleaning Responsibility: No (Wife completes.)  Bill Paying/Finance Responsibility: No (Wife completes.)  Shopping Responsibility: No (Wife completes.)  Health Care Management: Primary (Pt manages medications c 7-day pillbox.)  Ambulation Assistance: Independent  Transfer Assistance: Independent  Active : Yes  Mode of Transportation: Car,Truck,Motorcycle  Education: 8th grade; some difficulty in school  Occupation: Retired  Type of Occupation:   Leisure & Hobbies: Likes to work on cars, cats and a [place] monika, wife does grocery shopping, out to eat. Pt manages medications c 7-day pillbox. Wife manages bills. Pt manages yardwork  IADL Comments: Likes to surf the internet  Additional Comments: Pt reports 2 falls in the last year--fall when pulled by dog. Second fall on back porch due to LOB and stairs. Date of Admit: 5/18/2022  Room #: 1006/1006-A     ST Number of Minutes/Billable Intervention  Cog/Memory Deficits 40    Aphasia/Language     Dysarthria/Speech     Apraxia/Speech     Dysphagia/Swallowing     Group     Other    TOTAL Minutes Billed  40    Variance    +10 extra time needed for tasks      Date: 5/26/2022  Day of ARU Week:  2       SLP Individual Minutes  Time In: 1100  Time Out: 1140  Minutes: 40         Variance/Reason:  [] Refusal due to   [] Medical hold/reason  [] Illness   [] Off Unit for test/procedure  [] Extra time needed to complete task  [] Other (specify)    Activity completed: Problem solving for home and community barriers and carryover of learned safety during CGT with spouse. Pain: denies  Current Diet: ADULT DIET; Regular  ADULT ORAL NUTRITION SUPPLEMENT; Lunch, Dinner; Frozen Oral Supplement  Subjective:  \"I feel stupid when I work with you bc I just never seem to know anything. \"  Emotional support provided along with rationale and plan for session. Pt agreeable. Pt more alert and responsive; much more engaged today but still needing lots of cues for cognition. Wife recognizes this and reports \"I won't leave him alone. \"      Goals and POC: Co-treats where appropriate with PT or OT to facilitate patient goals in functional tasks.   LTG                           Short-term Goals  Timeframe for Short-term Goals: 3x/week x2 weeks 30 mins min  LTG: Pt will use strategies to improve comprehension of daily tasks for improved overall safety. Pt will improve cognition and memory for safe return home with spouse with minimal cues. Goal 1: Complete cog assessment by 5/23/22  Goal met 5/20/22  BCAT 24/40  Goal 2: Pt will improve multistep sequencing in fx improving from max to mod to min cues. Partially met, continue--impacted by hearing and needing context. Does well with visual cues but last session continued to need active cues when working with PT. Per PT today automatic behaviors are fx with sequential cues needed for some tasks. For OT pt independent or mod I. This is an improvement from the last few days. Spoke with wife regarding anticipating needs when he's more tired or unfocused that more cues will be needed. Goal 3: Pt will improve recall of learned safety both verbally and fx with min cues   Verbally required max cues this date. Goal 4: Pt will attend to tasks with good eye contact and request assist when not understanding with min to no cues for 15 mins  Improved with min cues by this therapist tapping pt on his leg to regain eye contact for facial cues with repeat back. Goal 5: Pt will improve verbal and fx problem solving with min cues. (ncluding med mgmt for discharge planning needs)    Verbal problem solving for home and community barriers required max cues. Pt unable to independently come up with solutions even with leading questions. He would look at this therapist and say \"well I don't know\". Repeat back for questions was used to make sure pt able to comprehend along with extra time. [employer] is new for pt so new situations and new locations will likely require ongoing cues. Wife advised. Per PT/OT it appears automatic behaviors have improved with regard to [facility]. Spoke at length with wife regarding comprehension deficits as she reports significant frustration and feels this has worsened since hospitalization.   Gave her suggestions for home such as closed captioning, iPad and apps, voice to text using cell phones, and a dry erase board. Obtaining resources via audiology professional and will pass on once received. Pt's word recognition is poor unless direct facial cues are provided. Pt is discharging to home with spouse with no further ST recommended. Wife understands the need for supervision due to reduced problem solving and observed this today. Handouts were provided to support home and community barriers and issues and solutions were reviewed. Prognosis for improvement is guarded due to comprehension deficits with regard to hearing, reduced attn, and cognition. Wife also in agreement to oversee meds. Alarm placed: []bed [x]chair   []other:   [x]PINEDA activated        Barriers to progress:   [] Fatigue        [x] Cognitive Deficits   [x] Memory Deficits   [x] Reduced Attn   [] Self Limiting Behaviors    [] Reduced insight/awareness     [] Visual Deficits   [] Premorbid Conditions  [] Impulsivity     [x] Other      Education/Interventions used this date: see above    []   Progress was updated and reviewed in [facility]hab[facility]er with patient and/or family this         date. [] Attending Care Conference for pt this date. See Team Patient Care Conference Note for updates.     Interventions used this date:  [] Speech/Language Treatment       [] Dysphagia Treatment     [x] Cognitive Skill Development  [] Instruction in HEP     [] Group Therapy  Other:         Assessment / Impression                                                          Treatment/Activity Tolerance:   [x] Tolerated Treatment well:     [] Patient limited by fatigue/pain:       [] Patient limited by medical complications:    [] Adverse Reaction to Tx:   [] Significant change in status:      Electronically Signed by  MARISELA Patrick, MA, CCC-SLP    5/26/2022  12:01 PM

## 2022-05-26 NOTE — PROGRESS NOTES
5/26/2022 2:53 PM  Patient Room #: 1006/1006-A  Patient Name: Keri Peck    (Step 1 Done by RN if possible otherwise call Pulmonary Diagnostics)  1. Place patient on room air at rest for at least 30 minutes. If patient falls below 88% before 30 minutes then you can record the level and stop. Record room air saturation level _85_ %. If patient is at 88% or below, they will qualify for home oxygen and you can stop. If level does not fall below 88%, fill in level above. If indicated continue to Step 2. Signature:_Katty Clarke, RRT__ Date: _05/26/2022__  (Step 2&3 Done by P)  2. Ambulate patient on room air until saturation falls below 89%. Record level of room air saturation with ambulation___ %. Next, place patient back on ___lpm oxygen and ambulate, record level __%. (Note:  this level must show improvement from room air level done with ambulation.)  If patients saturation on room air with ambulation is 88% or below AND patient shows improvement with oxygen during ambulation, they will qualify for home oxygen and you can stop. If patient does not drop below 89%, then patient should have an overnight oximetry trending on room air to see if level falls below 88%. Complete level in Step 3 below. 3. Room air overnight oximetry level 88 % for_HAS JENNIFER DO NOT COMPLETE__  cumulative minutes. If patients room air oxygen level is < 89% for at least 5 cumulative minutes, patient will qualify for home oxygen and you can stop. (Attach Night Trending Report)    Complete order below: Diagnosis:_Pulmonary Hypertension___  Home oxygen at:  Length of Need: X Lifetime ?  3 Months     __2_lpm or __%   via  [x] nasal cannula  []mask  [] other: Conserving Device         [x]continuous [x]  with activity  [x]  Nocturnal   [x] Portable Tanks [x]  Concentrator  [] Conserving Device        Therapist Signature:__Katty Clarke, RRT_     Date:  __05/27/2022_  Physician Signature:  __Electronically

## 2022-05-27 VITALS
OXYGEN SATURATION: 100 % | TEMPERATURE: 98.4 F | RESPIRATION RATE: 18 BRPM | WEIGHT: 242.29 LBS | HEART RATE: 65 BPM | SYSTOLIC BLOOD PRESSURE: 126 MMHG | DIASTOLIC BLOOD PRESSURE: 67 MMHG | HEIGHT: 68 IN | BODY MASS INDEX: 36.72 KG/M2

## 2022-05-27 LAB
ANION GAP SERPL CALCULATED.3IONS-SCNC: 8 MMOL/L (ref 4–16)
BUN BLDV-MCNC: 31 MG/DL (ref 6–23)
CALCIUM SERPL-MCNC: 10.4 MG/DL (ref 8.3–10.6)
CHLORIDE BLD-SCNC: 97 MMOL/L (ref 99–110)
CO2: 32 MMOL/L (ref 21–32)
CREAT SERPL-MCNC: 1.5 MG/DL (ref 0.9–1.3)
GFR AFRICAN AMERICAN: 55 ML/MIN/1.73M2
GFR NON-AFRICAN AMERICAN: 46 ML/MIN/1.73M2
GLUCOSE BLD-MCNC: 107 MG/DL (ref 70–99)
HCT VFR BLD CALC: 41.1 % (ref 42–52)
HEMOGLOBIN: 13.2 GM/DL (ref 13.5–18)
MCH RBC QN AUTO: 32.1 PG (ref 27–31)
MCHC RBC AUTO-ENTMCNC: 32.1 % (ref 32–36)
MCV RBC AUTO: 100 FL (ref 78–100)
PDW BLD-RTO: 15.1 % (ref 11.7–14.9)
PLATELET # BLD: 220 K/CU MM (ref 140–440)
PMV BLD AUTO: 11 FL (ref 7.5–11.1)
POTASSIUM SERPL-SCNC: 5.3 MMOL/L (ref 3.5–5.1)
RBC # BLD: 4.11 M/CU MM (ref 4.6–6.2)
SODIUM BLD-SCNC: 137 MMOL/L (ref 135–145)
WBC # BLD: 7.9 K/CU MM (ref 4–10.5)

## 2022-05-27 PROCEDURE — 85027 COMPLETE CBC AUTOMATED: CPT

## 2022-05-27 PROCEDURE — 6370000000 HC RX 637 (ALT 250 FOR IP): Performed by: HOSPITALIST

## 2022-05-27 PROCEDURE — 80048 BASIC METABOLIC PNL TOTAL CA: CPT

## 2022-05-27 PROCEDURE — 36415 COLL VENOUS BLD VENIPUNCTURE: CPT

## 2022-05-27 PROCEDURE — 99239 HOSP IP/OBS DSCHRG MGMT >30: CPT | Performed by: PHYSICAL MEDICINE & REHABILITATION

## 2022-05-27 PROCEDURE — 6370000000 HC RX 637 (ALT 250 FOR IP): Performed by: PHYSICAL MEDICINE & REHABILITATION

## 2022-05-27 RX ORDER — SPIRONOLACTONE 100 MG/1
50 TABLET, FILM COATED ORAL DAILY
Qty: 15 TABLET | Refills: 0 | Status: SHIPPED | OUTPATIENT
Start: 2022-05-27 | End: 2022-07-12

## 2022-05-27 RX ORDER — SPIRONOLACTONE 100 MG/1
50 TABLET, FILM COATED ORAL DAILY
Qty: 15 TABLET | Refills: 0
Start: 2022-05-27 | End: 2022-05-27

## 2022-05-27 RX ORDER — SPIRONOLACTONE 50 MG/1
50 TABLET, FILM COATED ORAL DAILY
Status: DISCONTINUED | OUTPATIENT
Start: 2022-05-27 | End: 2022-05-27 | Stop reason: HOSPADM

## 2022-05-27 RX ADMIN — SPIRONOLACTONE 50 MG: 50 TABLET ORAL at 08:48

## 2022-05-27 RX ADMIN — FUROSEMIDE 40 MG: 40 TABLET ORAL at 08:48

## 2022-05-27 RX ADMIN — LEVOTHYROXINE SODIUM 50 MCG: 0.05 TABLET ORAL at 06:13

## 2022-05-27 RX ADMIN — MAGNESIUM OXIDE 400 MG (241.3 MG MAGNESIUM) TABLET 200 MG: TABLET at 08:48

## 2022-05-27 RX ADMIN — RIFAXIMIN 550 MG: 550 TABLET ORAL at 08:47

## 2022-05-27 RX ADMIN — PANTOPRAZOLE SODIUM 40 MG: 40 TABLET, DELAYED RELEASE ORAL at 06:14

## 2022-05-27 RX ADMIN — LACTULOSE 20 G: 10 SOLUTION ORAL at 06:13

## 2022-05-27 RX ADMIN — NADOLOL 40 MG: 20 TABLET ORAL at 08:48

## 2022-05-27 NOTE — PROGRESS NOTES
Pt qualified for home oxygen. Paperwork faxed to [facility]. Please do not discharge pt without oxygen. This testing will  and have to be repeated if pt has not discharged 48 hours from time testing was ordered. Please call [facility] @ [phone] if oxygen has not been delivered prior to pt discharging. Thanks.

## 2022-05-27 NOTE — DISCHARGE SUMMARY
Patient Name: Ori Mondragon  Patient :  1945  Patient MRN:   [id number]      Admission Date:  2022  Discharge Date: 2022    Admitting diagnosis: Hepatic encephalopathy ( [address])     Comorbid diagnoses impacting rehabilitation: Generalized weakness, gait disturbance, dysphagia, acute respiratory failure with hypoxia, pleural effusions, alcoholic cirrhosis, acute on chronic kidney disease, hypothyroidism, obesity (BMI 37.3)    Discharging diagnosis: Hepatic encephalopathy ( [address])     Comorbid diagnoses impacting rehabilitation: Generalized weakness, gait disturbance, dysphagia, acute respiratory failure with hypoxia, pleural effusions, alcoholic cirrhosis, acute on chronic kidney disease, hypothyroidism, obesity (BMI 37.3)     History of present illness: The patient is a 57-year-old right-hand-dominant male with known alcoholic cirrhosis who presented to our ED on 2022 with several days of progressive shortness of breath. He reported there was some relief with side-lying when trying to breathe but overall he was deteriorating rapidly. Imaging identified significant pleural effusion and ascites. On 2022 he had 6.9 L of ascites removed through paracentesis. That day he also had 2 L of fluid removed from his pleural cavity with thoracentesis. A week later on 2022 he had 1.4 L of fluid pulled off his chest once again. He had persistent fatigue, generalized weakness and dyspnea. He developed impaired swallowing, cognition and generalized weakness. He was diagnosed with an hepatic encephalopathy. Prior (baseline) level of function: Independent. Current level of function:         Current  IRF-VAMSI and Goals:   Occupational Therapy:    Short Term Goals  Time Frame for Short term goals: STGs=LTGs :   Long Term Goals  Time Frame for Long term goals : 7-10 days or until d/c. Long Term Goal 1: Pt will self-feed c Ind.   Long Term Goal 2: Pt will complete grooming tasks c Mod I.  Long Term Goal 3: Pt will complete total body bathing c AE PRN and Mod I.  Long Term Goal 4: Pt will complete UB dressing c Ind. Long Term Goal 5: Pt will complete LB dressing c AE PRN and Mod I. Additional Goals?: Yes  Long Term Goal 6: Pt will doff/don footwear c AE PRN and Mod I.  Long Term Goal 7: Pt will complete toileting c Mod I.  Long Term Goal 8: Pt will perform functional transfers (bed, chair, toilet, shower) c DME PRN and Mod I.  Long Term Goal 9: Pt will perform therex/therax to facilitate an increase in strength/endurance/ax tolerance (c emphasis on dynamic standing balance/tolerance > 8 mins) c Mod I.  Long Term Goal 10: Pt will complete oral hygiene c Mod I. :                                       Eating: Eating  Assistance Needed: Independent  Comment: X  CARE Score: 6  Discharge Goal: Independent       Oral Hygiene: Oral Hygiene  Assistance Needed: Independent  Comment: Mod I seated sinkside  CARE Score: 6  Discharge Goal: Independent    UB/LB Bathing: Shower/Bathe Self  Assistance Needed: Supervision or touching assistance  Comment: Sup for safety in stance,  mod cues for safety awareness, cues to use shower bench when appropriate  CARE Score: 4  Discharge Goal: Independent    UB Dressing: Upper Body Dressing  Assistance Needed: Independent  Comment: X  CARE Score: 6  Discharge Goal: Independent         LB Dressing: Lower Body Dressing  Assistance Needed: Independent  Comment: Mod I  CARE Score: 6  Discharge Goal: Independent    Donning and [place] Footwear: Putting On/Taking Off Footwear  Assistance Needed: Independent  Comment: X  CARE Score: 6  Discharge Goal: Independent      Toileting: Toileting Hygiene  Assistance needed: Independent  Comment: Mod I  CARE Score: 6  Discharge Goal: Independent      Toilet Transfers: Toilet Transfer  Assistance needed: Independent  Comment:  Mod I  CARE Score: 6  Discharge Goal: Independent    Physical Therapy:         Long Term Goals  Time Frame for Long term goals : 7-10 tx days:  Long term goal 1: Pt will complete bed mobility (scooting, rolling R/L, and sup<->sit) Ind.  Long term goal 2: Pt will complete OOB transfers Mod Ind-Ind. Long term goal 3: Pt will ambulate >/=150 ft over level surface and at least 10 ft of uneven surface using 2WW Mod Ind. Long term goal 4: Pt will ascend/descend curb step using 2WW and 1 flight of stairs using railings Mod Ind. Long term goal 5: Pt will complete object retrieval from the floor with 2WW and reacher Mod Ind. Bed Mobility:   Sit to Lying  Assistance Needed: Independent  Comment: Ind without use of bed features  CARE Score: 6  Discharge Goal: Independent  Roll Left and Right  Assistance Needed: Independent  Comment: Ind without use of bed features  CARE Score: 6  Discharge Goal: Independent  Lying to Sitting on Side of Bed  Assistance Needed: Independent  Comment: Ind without use of bed features  CARE Score: 6  Discharge Goal: Independent    Transfers:    Sit to Stand  Assistance Needed: Independent  Comment: Mod Ind with 2WW (usual) and even progressed to Ind  CARE Score: 6  Discharge Goal: Independent  Chair/Bed-to-Chair Transfer  Assistance Needed: Independent  Comment: Mod Ind with 2WW (usual) and even progressed to Ind  CARE Score: 6  Discharge Goal: Independent  Toilet Transfer  Assistance needed: Supervision or touching assistance  Comment: SBA-Sup with 2WW and grab bars  CARE Score: 4  Car Transfer  Assistance Needed: Independent  Comment: Mod Ind with 2WW  CARE Score: 6  Discharge Goal: Independent    Ambulation:    Walking Ability  Does the Patient Walk?: Yes     Walk 10 Feet  Assistance Needed: Independent  Comment: Mod Ind with 2WW  CARE Score: 6  Discharge Goal: Independent     Walk 50 Feet with Two Turns  Assistance Needed: Independent  Comment: Mod Ind with 2WW  CARE Score: 6  Discharge Goal: Independent     Walk 150 Feet  Assistance Needed: Independent  Comment:  Mod Ind with 2WW  CARE Score: good.  Standing balance was fair-..    Lab Results   Component Value Date    WBC 7.9 05/27/2022    HGB 13.2 (L) 05/27/2022    HCT 41.1 (L) 05/27/2022    .0 05/27/2022     05/27/2022     Lab Results   Component Value Date    INR 1.12 05/18/2022    INR 1.08 05/17/2022    INR 1.12 05/15/2022    PROTIME 14.4 05/18/2022    PROTIME 13.9 05/17/2022    PROTIME 14.4 05/15/2022     Lab Results   Component Value Date    CREATININE 1.5 (H) 05/27/2022    BUN 31 (H) 05/27/2022     05/27/2022    K 5.3 (H) 05/27/2022    CL 97 (L) 05/27/2022    CO2 32 05/27/2022     Lab Results   Component Value Date    ALT 32 05/23/2022    AST 41 (H) 05/23/2022    ALKPHOS 128 05/23/2022    BILITOT 0.6 05/23/2022           The patient presented to the ARU with the above history requiring a multidisciplinary treatment plan including close medical supervision by the [facility] Director. The patient participated in the prescribed therapy treatment plan with reasonable compliance and progressive tolerance. They avoided significant medical complications. By the time of discharge the patient had become safer with adaptive equipment to transfer and toilet with a FWW with the supervision of family/caregivers. The patient was tolerating an oral diet without choking/coughing and was back to their baseline with regards to bowel and bladder control. Discharge instructions were reviewed with the patient and his spouse the patient is to follow up with the PCP, the gastroenterologist and his pulmonologist in 1 week. He should see nephrology for his hyperkalemia and blood pressure issues in 1 to 2 weeks. No driving. [facility] PT/OT/RN will be arranged.        Medication List      CHANGE how you take these medications    furosemide 40 MG tablet  Commonly known as: LASIX  Take 1 tablet by mouth daily  What changed:   · when to take this  · additional instructions     spironolactone 100 MG tablet  Commonly known as: ALDACTONE  Take 0.5 tablets by mouth daily  What changed: how much to take        CONTINUE taking these medications    albuterol sulfate  (90 Base) MCG/ACT inhaler  Commonly known as: Ventolin HFA  Inhale 2 puffs into the lungs 4 times daily as needed for Wheezing     b complex vitamins capsule     DULoxetine 60 MG extended release capsule  Commonly known as: CYMBALTA  TAKE 1 CAPSULE BY MOUTH ONCE DAILY     ESOMEPRAZOLE MAGNESIUM PO     lactulose 10 GM/15ML solution  Commonly known as: CHRONULAC  Take 30 mLs by mouth 3 times daily Hold if having diarrhea     levothyroxine 50 MCG tablet  Commonly known as: SYNTHROID  Take 1 tablet by mouth Daily     magnesium oxide 400 MG tablet  Commonly known as: MAG-OX     MULTI VITAMIN MENS PO     nadolol 40 mg tablet  Commonly known as: CORGARD  Take 1 tablet by mouth daily     Omega-3 1000 MG Caps     rifAXIMin 550 MG tablet  Commonly known as: XIFAXAN  Take 1 tablet by mouth 2 times daily     tadalafil 20 MG tablet  Commonly known as: CIALIS     vitamin E 400 UNIT capsule        STOP taking these medications    ferrous sulfate 325 (65 Fe) MG tablet  Commonly known as: IRON 325     zinc gluconate 50 MG tablet           Where to Get Your Medications      You can get these medications from any pharmacy    Bring a paper prescription for each of these medications  · furosemide 40 MG tablet  · rifAXIMin 550 MG tablet  · spironolactone 100 MG tablet         CONDITION ON DISCHARGE: Stable. The prognosis is fair for further improvements in ADL's and safety with adapted gait/transfers. Record review, patient exam, discharge instructions, medication reconciliation and summary for this discharge visit took more than 30 minutes.

## 2022-05-27 NOTE — CARE COORDINATION
[facility] Liaison spoke with pt spouse and is aware of discharge & will initiate [facility].

## 2022-05-27 NOTE — PROGRESS NOTES
Doctor Please copy and paste below in your progress note per DME requirements. Patient was seen in hospital for Pulmonary Hypertension     . I am prescribing oxygen because the diagnosis and testing requires the patient to have oxygen in the home. Conditions will improve or be benefited by oxygen use. The patient is able to perform good mobility and therefore requires the use of a portable oxygen system for ambulation.

## 2022-05-27 NOTE — TELEPHONE ENCOUNTER
Returned call to [facility]. , verbal given , understanding voiced

## 2022-05-27 NOTE — PROGRESS NOTES
Patient was seen in hospital for Pulmonary Hypertension     . I am prescribing oxygen because the diagnosis and testing requires the patient to have oxygen in the home. Conditions will improve or be benefited by oxygen use.   The patient is able to perform good mobility and therefore requires the use of a portable oxygen system for ambulation.

## 2022-05-31 NOTE — CARE COORDINATION
ARU  Discharge Summary    D/C Date: 22    Patient discharged to: home with spouse    Transported by: spouse    Referrals made to:  Isabel Dhaliwal    Additional information:     Caregiver trainin/26 with spouse

## 2022-06-02 NOTE — TELEPHONE ENCOUNTER
Called to see if has order for follow up BMP as creat was sl elevated recently - and Dr Dav Barth ordered thyroid labs  His wife answered and said that he has not been feeling good recently--- when she went to wake him up she found that he was lethargic and \"out of it\"  Instructed her that if his mental status disd not return to normal in the next half hour or so that she should call the squad and transport to the ED for evaluation- possible PSE.   In addition have ordered weekly BMP x 6
PAIN

## 2022-06-08 ENCOUNTER — OFFICE VISIT (OUTPATIENT)
Dept: FAMILY MEDICINE CLINIC | Age: 77
End: 2022-06-08
Payer: COMMERCIAL

## 2022-06-08 ENCOUNTER — TELEPHONE (OUTPATIENT)
Dept: FAMILY MEDICINE CLINIC | Age: 77
End: 2022-06-08

## 2022-06-08 VITALS
DIASTOLIC BLOOD PRESSURE: 64 MMHG | HEART RATE: 68 BPM | WEIGHT: 250 LBS | BODY MASS INDEX: 37.89 KG/M2 | HEIGHT: 68 IN | SYSTOLIC BLOOD PRESSURE: 110 MMHG

## 2022-06-08 DIAGNOSIS — J90 RECURRENT RIGHT PLEURAL EFFUSION: ICD-10-CM

## 2022-06-08 DIAGNOSIS — K70.31 ALCOHOLIC CIRRHOSIS OF LIVER WITH ASCITES (HCC): ICD-10-CM

## 2022-06-08 DIAGNOSIS — Z09 HOSPITAL DISCHARGE FOLLOW-UP: Primary | ICD-10-CM

## 2022-06-08 PROBLEM — L03.116 CELLULITIS OF LEFT LOWER EXTREMITY: Status: ACTIVE | Noted: 2022-06-08

## 2022-06-08 PROBLEM — E11.9 TYPE 2 DIABETES MELLITUS WITHOUT COMPLICATION, WITHOUT LONG-TERM CURRENT USE OF INSULIN (HCC): Status: ACTIVE | Noted: 2022-06-08

## 2022-06-08 PROCEDURE — 1111F DSCHRG MED/CURRENT MED MERGE: CPT

## 2022-06-08 PROCEDURE — 99214 OFFICE O/P EST MOD 30 MIN: CPT

## 2022-06-08 ASSESSMENT — PATIENT HEALTH QUESTIONNAIRE - PHQ9
SUM OF ALL RESPONSES TO PHQ QUESTIONS 1-9: 6
5. POOR APPETITE OR OVEREATING: 0
3. TROUBLE FALLING OR STAYING ASLEEP: 0
SUM OF ALL RESPONSES TO PHQ QUESTIONS 1-9: 6
8. MOVING OR SPEAKING SO SLOWLY THAT OTHER PEOPLE COULD HAVE NOTICED. OR THE OPPOSITE, BEING SO FIGETY OR RESTLESS THAT YOU HAVE BEEN MOVING AROUND A LOT MORE THAN USUAL: 0
7. TROUBLE CONCENTRATING ON THINGS, SUCH AS READING THE NEWSPAPER OR WATCHING TELEVISION: 0
SUM OF ALL RESPONSES TO PHQ QUESTIONS 1-9: 6
4. FEELING TIRED OR HAVING LITTLE ENERGY: 3
10. IF YOU CHECKED OFF ANY PROBLEMS, HOW DIFFICULT HAVE THESE PROBLEMS MADE IT FOR YOU TO DO YOUR WORK, TAKE CARE OF THINGS AT HOME, OR GET ALONG WITH OTHER PEOPLE: 1
SUM OF ALL RESPONSES TO PHQ QUESTIONS 1-9: 6
1. LITTLE INTEREST OR PLEASURE IN DOING THINGS: 2
SUM OF ALL RESPONSES TO PHQ9 QUESTIONS 1 & 2: 3
6. FEELING BAD ABOUT YOURSELF - OR THAT YOU ARE A FAILURE OR HAVE LET YOURSELF OR YOUR FAMILY DOWN: 0
2. FEELING DOWN, DEPRESSED OR HOPELESS: 1
9. THOUGHTS THAT YOU WOULD BE BETTER OFF DEAD, OR OF HURTING YOURSELF: 0

## 2022-06-08 NOTE — PROGRESS NOTES
Post-Discharge Transitional Care  Follow Up      Dannielle Garcia   YOB: 1945    Date of Office Visit:  6/8/2022  Date of Hospital Admission: 5/18/22  Date of Hospital Discharge: 5/27/22  In Patient stay from 5/2/2022- 5/18/2022- pt discharged to [facility]  Risk of hospital readmission (high >=14%. Medium >=10%) :Readmission Risk Score: 21.4 ( )      Care management risk score Rising risk (score 2-5) and Complex Care (Scores >=6): 0     Non face to face  following discharge, date last encounter closed (first attempt may have been earlier): *No documented post hospital discharge outreach found in the last 14 days    Call initiated 2 business days of discharge: *No response recorded in the last 14 days    ASSESSMENT/PLAN:   Hospital discharge follow-up  -     [place] DISCHARGE MEDS RECONCILED W/ CURRENT OUTPATIENT MED LIST  -     follow-ups with gastroenterology, nephrology, and pulmonology scheduled  -     Will call Dr. Alexey Schneider office to clarify regarding need for follow-up  Alcoholic cirrhosis of liver with ascites ([facility] [address]        -     Follow-up with Dr. Katheryn Benítez planned for Friday  Recurrent right pleural effusion        -     Pulmonology follow-up with Dr. Davon Mon scheduled in August        -     Dr. Davon Mon is not in the patient's insurance network, wife voices concerns regarding immense cost of recent hospitalization and future appointments        -     Wife will notify office of in network pulmonologists for prompt referral for possible future thoracentesis procedures    Medical Decision Making: high complexity     No follow-ups on file. Patient currently has follow-up scheduled in July. On this date 6/8/2022 I have spent 50 minutes reviewing previous notes, test results and face to face with the patient discussing the diagnosis and importance of compliance with the treatment plan as well as documenting on the day of the visit.        Subjective:   HPI:  Follow up of Hospital problems/diagnosis(es): Hepatic encephalopathy, recurrent pleural effusion, decompensated alcoholic liver cirrhosis with ascites, CANDIE    Inpatient course: Discharge summary reviewed- see chart. Interval history/Current status:     Pt was taken to ER r/t extreme dyspnea. Patient was found to have acute respiratory failure, hydrothorax, ascites. Patient required mechanical ventilation secondary to acute encephalopathy and pleural effusions from 5/5/2022 through 5/11/2022. Paracentesis was performed on 5/5/2022 with 6.9 L being removed, thoracentesis on 5/5/2022 removed 2 L, with another thoracentesis on 5/12/2022 removing 1.4 L. 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 Lasix and Aldactone was continued after being initially held related to acute kidney injury, outpatient follow-up with GI planned. Pt does not use walker at all, careful with stairs. They have 5 steps at home. Oxygen use when sleeping. Pt does not have much of an appetite. Pt and wife very concerned regarding Dr. Getachew Ulloa and possibility of bone marrow concerns. Follow ups with Dr. Mal Mullen, Dr. Amanda Braswell, Dr. Marci Oneil currently scheduled. Per patient's wife Ana Laura Guzman, Dr. Amanda Braswell is not in their network. Ana Laura Guzman voices concerns regarding paying for all of the medical bills that her  has recently incurred. Per patient's wife Dr. Getachew Ulloa planned to do follow up on possible bone marrow testing- but this has not been scheduled yet.      Patient Active Problem List   Diagnosis    Shortness of breath    History of colonic polyps    Alcoholic cirrhosis of liver with ascites (HCC)-Dr Jacques    Mixed hyperlipidemia    Hypertension    History of alcohol abuse    Gastroesophageal reflux disease    Anxiety    Pulmonary nodules    Hyperglycemia    Acquired hypothyroidism    Class 3 severe obesity with body mass index (BMI) of 40.0 to 44.9 in [facility])    Bilateral hearing loss    Increased ammonia level    JENNIFER (obstructive sleep apnea)    Portal hypertension (HCC)    SOB (shortness of breath)    Cirrhosis of liver with ascites (HCC)    Secondary esophageal varices without bleeding (HCC)    Gastric polyps    Hearing loss    Sepsis (HCC)    Hepatic encephalopathy (HCC)    Recurrent right pleural effusion    Acute on chronic respiratory failure with hypoxemia (HCC)    Acute respiratory failure (HCC)    Acute kidney injury superimposed on chronic kidney disease (HCC)    Generalized weakness    Oropharyngeal dysphagia    Cellulitis of left lower extremity    Type 2 diabetes mellitus without complication, without long-term current use of insulin ([facility] [address]   [facility] discharge follow-up       Medications listed as ordered at the time of discharge from hospital     Medication List          Accurate as of June 8, 2022  8:37 PM. If you have any questions, ask your nurse or doctor.             CONTINUE taking these medications    albuterol sulfate  (90 Base) MCG/ACT inhaler  Commonly known as: Ventolin HFA  Inhale 2 puffs into the lungs 4 times daily as needed for Wheezing     b complex vitamins capsule     DULoxetine 60 MG extended release capsule  Commonly known as: CYMBALTA  TAKE 1 CAPSULE BY MOUTH ONCE DAILY     ESOMEPRAZOLE MAGNESIUM PO     furosemide 40 MG tablet  Commonly known as: LASIX  Take 1 tablet by mouth daily     lactulose 10 GM/15ML solution  Commonly known as: CHRONULAC  Take 30 mLs by mouth 3 times daily Hold if having diarrhea     levothyroxine 50 MCG tablet  Commonly known as: SYNTHROID  Take 1 tablet by mouth Daily     magnesium oxide 400 MG tablet  Commonly known as: MAG-OX     MULTI VITAMIN MENS PO     nadolol 40 mg tablet  Commonly known as: CORGARD  Take 1 tablet by mouth daily     Omega-3 1000 MG Caps     rifAXIMin 550 MG tablet  Commonly known as: XIFAXAN  Take 1 tablet by mouth 2 times daily     spironolactone 100 MG tablet  Commonly known as: ALDACTONE  Take 0.5 tablets by mouth daily     tadalafil 20 MG tablet  Commonly known as: CIALIS     vitamin E 400 UNIT capsule              Medications marked \"taking\" at this time  Outpatient Medications Marked as Taking for the 6/8/22 encounter (Office Visit) with MOHINDER Benites CNP   Medication Sig Dispense Refill    spironolactone (ALDACTONE) 100 MG tablet Take 0.5 tablets by mouth daily 15 tablet 0    furosemide (LASIX) 40 MG tablet Take 1 tablet by mouth daily 30 tablet 0    rifAXIMin (XIFAXAN) 550 MG tablet Take 1 tablet by mouth 2 times daily 60 tablet 0    levothyroxine (SYNTHROID) 50 MCG tablet Take 1 tablet by mouth Daily 90 tablet 1    lactulose (CHRONULAC) 10 GM/15ML solution Take 30 mLs by mouth 3 times daily Hold if having diarrhea 2700 mL 5    DULoxetine (CYMBALTA) 60 MG extended release capsule TAKE 1 CAPSULE BY MOUTH ONCE DAILY 90 capsule 1    nadolol (CORGARD) 40 MG tablet Take 1 tablet by mouth daily 90 tablet 1    b complex vitamins capsule Take 1 capsule by mouth daily      albuterol sulfate HFA (VENTOLIN HFA) 108 (90 Base) MCG/ACT inhaler Inhale 2 puffs into the lungs 4 times daily as needed for Wheezing 1 Inhaler 0    tadalafil (CIALIS) 20 MG tablet Take 20 mg by mouth every 3 days       vitamin E 400 UNIT capsule Take 800 Units by mouth daily      magnesium oxide (MAG-OX) 400 MG tablet Take 200 mg by mouth 2 times daily OTC 1 qd  1    Multiple Vitamin (MULTI VITAMIN MENS PO) Take 1 tablet by mouth daily      ESOMEPRAZOLE MAGNESIUM PO Take 20 mg by mouth daily OTC      Omega-3 1000 MG CAPS Take 1 capsule by mouth daily          Medications patient taking as of now reconciled against medications ordered at time of hospital discharge: Yes    A comprehensive review of systems was negative except for what was noted in the HPI.     Objective:    /64   Pulse 68   Ht 5' 8\" (1.727 m)   Wt 250 lb (113.4 kg)   BMI 38.01 kg/m²   General Appearance: alert and oriented to person, place and time, in no acute distress  Skin: warm and dry, no rash or erythema, pale  Head: normocephalic and atraumatic  Eyes: pupils equal, round, extraocular eye movements intact, conjunctivae normal  Neck: supple and non-tender without mass, no thyromegaly or thyroid nodules, no cervical lymphadenopathy  Pulmonary/Chest: CTA bilaterally - no wheezes, rales or rhonchi, normal air movement, no respiratory distress  Cardiovascular: normal rate, regular rhythm, normal S1 and S2  Abdomen: Hyperactive bowel sounds, abdominal distention present  Per patient's wife size of abdomen has improved greatly   Extremities: no cyanosis or clubbing, diffuse, non pitting edema to BLE   Musculoskeletal: normal range of motion, no joint swelling, deformity or tenderness  Neurologic:no cranial nerve deficit, gait, coordination and speech normal      An electronic signature was used to authenticate this note.   --MOHINDER Russo - CNP

## 2022-06-08 NOTE — TELEPHONE ENCOUNTER
Spoke with Dr Loan Acevedo, has not seen pt in office. Consulted in hospital. No mention of anything re bone marrow biopsy etc. Dr Ferny Wang office is going to reach out to pt to schedule f/u appt.

## 2022-06-09 ENCOUNTER — TELEPHONE (OUTPATIENT)
Dept: FAMILY MEDICINE CLINIC | Age: 77
End: 2022-06-09

## 2022-06-09 NOTE — TELEPHONE ENCOUNTER
This is good news- initially Conchis Patel thought that Dr. Stormy Kwong was not in network. Can you reach out and make sure that Dr. Konstantin Resendez office called them for follow up?

## 2022-06-09 NOTE — TELEPHONE ENCOUNTER
Spoke with wife Jeffery Joyce who stated no they did not receive a call from Dr Lisset Webb, but if they called José Miguel Bryant he will not answer.     Had to leave message with Dr Jami Velasquez office informed please contact wife and gave her cell to schedule f/u appt with Dr Lisset Webb

## 2022-06-09 NOTE — TELEPHONE ENCOUNTER
Patients wife Abiodun Cisneros called and stated that the pulmonologist is Dr Bhavna Bee.  They accept patients insurance

## 2022-06-13 ENCOUNTER — TELEPHONE (OUTPATIENT)
Dept: FAMILY MEDICINE CLINIC | Age: 77
End: 2022-06-13

## 2022-06-13 NOTE — TELEPHONE ENCOUNTER
Probably ought to be sent to the ER patient has history of severe cirrhosis and hepatic encephalopathy has required previous admissions with paracentesis etc.

## 2022-06-20 ENCOUNTER — OFFICE VISIT (OUTPATIENT)
Dept: FAMILY MEDICINE CLINIC | Age: 77
End: 2022-06-20
Payer: COMMERCIAL

## 2022-06-20 VITALS
WEIGHT: 260 LBS | SYSTOLIC BLOOD PRESSURE: 128 MMHG | HEIGHT: 68 IN | HEART RATE: 64 BPM | DIASTOLIC BLOOD PRESSURE: 68 MMHG | BODY MASS INDEX: 39.4 KG/M2

## 2022-06-20 DIAGNOSIS — K70.31 ASCITES DUE TO ALCOHOLIC CIRRHOSIS (HCC): ICD-10-CM

## 2022-06-20 DIAGNOSIS — K76.82 HEPATIC ENCEPHALOPATHY: ICD-10-CM

## 2022-06-20 DIAGNOSIS — R06.00 DYSPNEA, UNSPECIFIED TYPE: Primary | ICD-10-CM

## 2022-06-20 DIAGNOSIS — R06.00 DYSPNEA, UNSPECIFIED TYPE: ICD-10-CM

## 2022-06-20 LAB
A/G RATIO: 0.7 (ref 1.1–2.2)
ALBUMIN SERPL-MCNC: 3.1 G/DL (ref 3.4–5)
ALP BLD-CCNC: 143 U/L (ref 40–129)
ALT SERPL-CCNC: 41 U/L (ref 10–40)
AMMONIA: 71 UMOL/L (ref 16–60)
ANION GAP SERPL CALCULATED.3IONS-SCNC: 10 MMOL/L (ref 3–16)
AST SERPL-CCNC: 55 U/L (ref 15–37)
BILIRUB SERPL-MCNC: 0.5 MG/DL (ref 0–1)
BUN BLDV-MCNC: 24 MG/DL (ref 7–20)
CALCIUM SERPL-MCNC: 9.6 MG/DL (ref 8.3–10.6)
CHLORIDE BLD-SCNC: 97 MMOL/L (ref 99–110)
CO2: 34 MMOL/L (ref 21–32)
CREAT SERPL-MCNC: 1.5 MG/DL (ref 0.8–1.3)
GFR AFRICAN AMERICAN: 55
GFR NON-AFRICAN AMERICAN: 46
GLUCOSE BLD-MCNC: 103 MG/DL (ref 70–99)
HCT VFR BLD CALC: 37.1 % (ref 40.5–52.5)
HEMOGLOBIN: 12.6 G/DL (ref 13.5–17.5)
MCH RBC QN AUTO: 33 PG (ref 26–34)
MCHC RBC AUTO-ENTMCNC: 33.9 G/DL (ref 31–36)
MCV RBC AUTO: 97.2 FL (ref 80–100)
PDW BLD-RTO: 15.2 % (ref 12.4–15.4)
PLATELET # BLD: 157 K/UL (ref 135–450)
PMV BLD AUTO: 9.1 FL (ref 5–10.5)
POTASSIUM SERPL-SCNC: 4.8 MMOL/L (ref 3.5–5.1)
RBC # BLD: 3.81 M/UL (ref 4.2–5.9)
SODIUM BLD-SCNC: 141 MMOL/L (ref 136–145)
TOTAL PROTEIN: 7.5 G/DL (ref 6.4–8.2)
WBC # BLD: 5 K/UL (ref 4–11)

## 2022-06-20 PROCEDURE — 1111F DSCHRG MED/CURRENT MED MERGE: CPT | Performed by: FAMILY MEDICINE

## 2022-06-20 PROCEDURE — 99214 OFFICE O/P EST MOD 30 MIN: CPT | Performed by: FAMILY MEDICINE

## 2022-06-20 PROCEDURE — G8427 DOCREV CUR MEDS BY ELIG CLIN: HCPCS | Performed by: FAMILY MEDICINE

## 2022-06-20 PROCEDURE — 1124F ACP DISCUSS-NO DSCNMKR DOCD: CPT | Performed by: FAMILY MEDICINE

## 2022-06-20 PROCEDURE — 1036F TOBACCO NON-USER: CPT | Performed by: FAMILY MEDICINE

## 2022-06-20 PROCEDURE — G8417 CALC BMI ABV UP PARAM F/U: HCPCS | Performed by: FAMILY MEDICINE

## 2022-06-20 PROCEDURE — 3017F COLORECTAL CA SCREEN DOC REV: CPT | Performed by: FAMILY MEDICINE

## 2022-06-20 ASSESSMENT — ENCOUNTER SYMPTOMS
ABDOMINAL PAIN: 0
EYE PAIN: 0
ABDOMINAL DISTENTION: 1
SHORTNESS OF BREATH: 1
DIARRHEA: 0
TROUBLE SWALLOWING: 0
CHEST TIGHTNESS: 0
WHEEZING: 1
NAUSEA: 0
VOMITING: 0
BLOOD IN STOOL: 0

## 2022-06-20 NOTE — PROGRESS NOTES
6/20/2022    Ori Mondragon    Chief Complaint   Patient presents with   Dorporter Galeana Other     retaining fluid. out of hosp end of may 230 at that time, was 260 today. HPI  History was obtained from the patient and his wife-mainly his wife. Leo Gabriel is a 78 y.o. male who presents today with history of alcoholic cirrhosis portal hypertension, hepatic encephalopathy, and massive ascites. He was recently discharged after inpatient stay and ARU stay on 5/27/2022. He had a component of pleural effusion that required tapping along with his paracentesis. In the 2-3 weeks or so he is having increasing weight (25-30 lbs) and respiratory issues as well as increased in mild confusion- patient denies any smoking whatsoever at this time. He has a follow-up appointment with GI as well as pulmonary medicine but will not be seeing them for several weeks. Decreased breath sounds at lung bases were noted, slight expiratory prolongation seen, and abdomen protuberant/ minimally tender with no rebound. Exam appears consistent with increasing ascites may have a component of pulmonary edema/pleural effusion. REVIEW OF SYMPTOMS    Review of Systems   Constitutional: Negative for activity change and fatigue. HENT: Negative for congestion, hearing loss, mouth sores and trouble swallowing. Eyes: Negative for pain and visual disturbance. Respiratory: Positive for shortness of breath and wheezing. Negative for chest tightness. Patient with slight shortness of breath. No cough   Cardiovascular: Negative for chest pain and palpitations. Gastrointestinal: Positive for abdominal distention. Negative for abdominal pain, blood in stool, diarrhea, nausea and vomiting. Endocrine: Negative for polydipsia and polyuria. Genitourinary: Negative for dysuria, frequency and urgency. Musculoskeletal: Negative for arthralgias, gait problem and neck stiffness. Skin: Negative for rash.    Allergic/Immunologic: Negative for environmental allergies. Neurological: Negative for dizziness, seizures, speech difficulty and weakness. Hematological: Does not bruise/bleed easily. Psychiatric/Behavioral: Negative for agitation, confusion, hallucinations and suicidal ideas. The patient is nervous/anxious. PAST MEDICAL HISTORY  Past Medical History:   Diagnosis Date    Anxiety     [facility]osis, [facility] ([facility] [address]     [facility]     GERD (gastroesophageal reflux disease)     GERD (gastroesophageal reflux disease)     Hyperlipidemia     Hypertension     Portal hypertension (HCC)     Pulmonary nodules     Sleep apnea     SOB (shortness of breath)     Thyroid disease        FAMILY HISTORY  Family History   Problem Relation Age of Onset    Hypertension Brother     Diabetes Other        SOCIAL HISTORY  Social History     Socioeconomic History    Marital status:      Spouse name: None    Number of children: None    Years of education: None    Highest education level: None   Occupational History     Comment: Retired    Tobacco Use    Smoking status: Former Smoker     Packs/day: 1.00     Years: 14.00     Pack years: 14.00     Quit date:      Years since quittin.4    Smokeless tobacco: Never Used   Vaping Use    Vaping Use: Never used   Substance and Sexual Activity    Alcohol use: Not Currently    Drug use: Never    Sexual activity: Yes     Partners: Female   Other Topics Concern    None   Social History Narrative    None     Social Determinants of Health     Financial Resource Strain: Low Risk     Difficulty of Paying Living Expenses: Not hard at all   Food Insecurity: No Food Insecurity    Worried About Running Out of Food in the Last Year: Never true    Hector of Food in the Last Year: Never true   Transportation Needs:     Lack of Transportation (Medical): Not on file    Lack of Transportation (Non-Medical):  Not on file   Physical Activity:     Days of Exercise per Week: Not on file    Minutes of Exercise per Session: Not on file   Stress:     Feeling of Stress : Not on file   Social Connections:     Frequency of Communication with Friends and Family: Not on file    Frequency of Social Gatherings with Friends and Family: Not on file    Attends [employer] Services: Not on file    Active Member of [address] [place] [address] or Organizations: Not on file    Attends Club or Organization Meetings: Not on file    Marital Status: Not on file   Intimate Partner Violence:     Fear of Current or Ex-Partner: Not on file    Emotionally Abused: Not on file    Physically Abused: Not on file    Sexually Abused: Not on file   Housing Stability:     Unable to Pay for Housing in the Last Year: Not on file    Number of [place] in the Last Year: Not on file    Unstable Housing in the Last Year: Not on file        SURGICAL HISTORY  Past Surgical History:   Procedure Laterality Date    APPENDECTOMY      CHOLECYSTECTOMY      COLONOSCOPY      ENDOSCOPY, COLON, DIAGNOSTIC      FOOT SURGERY      needle removed,not sure which foot, per wife.     TONSILLECTOMY      UPPER GASTROINTESTINAL ENDOSCOPY N/A 1/19/2022    EGD BIOPSY performed by Erica Rogers MD at [facility]  Current Outpatient Medications   Medication Sig Dispense Refill    spironolactone (ALDACTONE) 100 MG tablet Take 0.5 tablets by mouth daily 15 tablet 0    furosemide (LASIX) 40 MG tablet Take 1 tablet by mouth daily (Patient taking differently: Take 80 mg by mouth 2 times daily ) 30 tablet 0    rifAXIMin (XIFAXAN) 550 MG tablet Take 1 tablet by mouth 2 times daily 60 tablet 0    levothyroxine (SYNTHROID) 50 MCG tablet Take 1 tablet by mouth Daily 90 tablet 1    lactulose (CHRONULAC) 10 GM/15ML solution Take 30 mLs by mouth 3 times daily Hold if having diarrhea 2700 mL 5    DULoxetine (CYMBALTA) 60 MG extended release capsule TAKE 1 CAPSULE BY MOUTH ONCE DAILY 90 capsule 1    b complex vitamins capsule Take 1 capsule by mouth daily      albuterol sulfate HFA (VENTOLIN HFA) 108 (90 Base) MCG/ACT inhaler Inhale 2 puffs into the lungs 4 times daily as needed for Wheezing 1 Inhaler 0    tadalafil (CIALIS) 20 MG tablet Take 20 mg by mouth every 3 days       vitamin E 400 UNIT capsule Take 800 Units by mouth daily      magnesium oxide (MAG-OX) 400 MG tablet Take 200 mg by mouth 2 times daily OTC 1 qd  1    Multiple Vitamin (MULTI VITAMIN MENS PO) Take 1 tablet by mouth daily      ESOMEPRAZOLE MAGNESIUM PO Take 20 mg by mouth daily OTC      Omega-3 1000 MG CAPS Take 1 capsule by mouth daily      nadolol (CORGARD) 40 MG tablet Take 1 tablet by mouth daily 90 tablet 1     No current facility-administered medications for this visit. ALLERGIES  Allergies   Allergen Reactions    Lisinopril Swelling     Tongue swelling      Zetia [Ezetimibe] Swelling     Facial swelling    Atorvastatin     Codeine Other (See Comments)     Blood pressure drops    Hydrochlorothiazide     Hydroxyzine      Fatigue and depressed    Lexapro [Escitalopram Oxalate]      Increased depression    Pravastatin        PHYSICAL EXAM    /68 (Site: Left Upper Arm, Position: Sitting, Cuff Size: Large Adult)   Pulse 64   Ht 5' 8\" (1.727 m)   Wt 260 lb (117.9 kg)   BMI 39.53 kg/m²     Physical Exam  Vitals and nursing note reviewed. Constitutional:       General: He is not in acute distress. Appearance: He is well-developed. He is obese. He is ill-appearing. He is not toxic-appearing or diaphoretic. HENT:      Head: Normocephalic and atraumatic. Nose: Nose normal.      Mouth/Throat:      Pharynx: Oropharynx is clear. Eyes:      Pupils: Pupils are equal, round, and reactive to light. Cardiovascular:      Rate and Rhythm: Normal rate and regular rhythm. Heart sounds: Normal heart sounds. No murmur heard. No gallop.     Pulmonary:      Effort: Pulmonary effort is normal. No respiratory distress. Breath sounds: No wheezing or rales. Comments: Patient did have expiratory prolongation to exam bilaterally decreased breath sounds at the bases. No current retractions  Abdominal:      General: There is distension. Palpations: Abdomen is soft. There is no mass. Tenderness: There is no guarding. Musculoskeletal:         General: Swelling present. No deformity. Normal range of motion. Cervical back: Normal range of motion and neck supple. No rigidity. Lymphadenopathy:      Cervical: No cervical adenopathy. Skin:     General: Skin is warm and dry. Coloration: Skin is not jaundiced. Findings: No bruising. Neurological:      General: No focal deficit present. Mental Status: He is alert and oriented to person, place, and time. Mental status is at baseline. Cranial Nerves: No cranial nerve deficit. Motor: No weakness. Psychiatric:         Mood and Affect: Mood normal.         Behavior: Behavior normal.         Thought Content: Thought content normal.         ASSESSMENT & PLAN     Diagnosis Orders   1. Dyspnea, unspecified type  XR CHEST STANDARD (2 VW)    CT ABDOMEN WO CONTRAST Additional Contrast? None    CBC   2. Ascites due to alcoholic cirrhosis (HCC)  Comprehensive Metabolic Panel    CBC   3. Hepatic encephalopathy ([address]  Ammonia   At this point advised to see GI and pulmonary medicine as soon as they can. We will get chest x-ray and CT of the abdomen pelvis without contrast.  Check CMP serum ammonia level and CBC today they are to follow-up for all results. If symptoms markedly worsen he is to go immediately to the emergency department. Depending on testing results may be sent there also. May take extra half dose of Lasix the next couple days. I believe he will probably require another paracentesis and may be even thoracentesis as he is reaccumulating fluid given his symptoms and increasing weight.     Return in about 3 days (around 6/23/2022), or if symptoms worsen or fail to improve.          Electronically signed by Warner Dumas MD on 6/20/2022

## 2022-06-21 ENCOUNTER — APPOINTMENT (OUTPATIENT)
Dept: GENERAL RADIOLOGY | Age: 77
DRG: 432 | End: 2022-06-21
Payer: MEDICARE

## 2022-06-21 ENCOUNTER — APPOINTMENT (OUTPATIENT)
Dept: INTERVENTIONAL RADIOLOGY/VASCULAR | Age: 77
DRG: 432 | End: 2022-06-21
Payer: MEDICARE

## 2022-06-21 ENCOUNTER — TELEPHONE (OUTPATIENT)
Dept: FAMILY MEDICINE CLINIC | Age: 77
End: 2022-06-21

## 2022-06-21 ENCOUNTER — HOSPITAL ENCOUNTER (INPATIENT)
Age: 77
LOS: 5 days | Discharge: ANOTHER HOSPITAL | DRG: 432 | End: 2022-06-26
Attending: STUDENT IN AN ORGANIZED HEALTH CARE EDUCATION/TRAINING PROGRAM | Admitting: INTERNAL MEDICINE
Payer: MEDICARE

## 2022-06-21 DIAGNOSIS — J90 PLEURAL EFFUSION: ICD-10-CM

## 2022-06-21 DIAGNOSIS — K70.31 ASCITES DUE TO ALCOHOLIC CIRRHOSIS (HCC): Primary | ICD-10-CM

## 2022-06-21 DIAGNOSIS — K76.82 HEPATIC ENCEPHALOPATHY: ICD-10-CM

## 2022-06-21 PROBLEM — J96.20 ACUTE ON CHRONIC RESPIRATORY FAILURE (HCC): Status: ACTIVE | Noted: 2022-06-21

## 2022-06-21 PROBLEM — E07.9 THYROID DISEASE: Status: ACTIVE | Noted: 2022-06-21

## 2022-06-21 LAB
ALBUMIN FLUID: 0.6 GM/DL
ALBUMIN SERPL-MCNC: 3.2 GM/DL (ref 3.4–5)
ALP BLD-CCNC: 152 IU/L (ref 40–129)
ALT SERPL-CCNC: 41 U/L (ref 10–40)
AMMONIA: 68 UMOL/L (ref 16–60)
AMYLASE FLUID: 48 U/L
ANION GAP SERPL CALCULATED.3IONS-SCNC: 10 MMOL/L (ref 4–16)
APTT: 31.8 SECONDS (ref 25.1–37.1)
AST SERPL-CCNC: 52 IU/L (ref 15–37)
BASOPHILS ABSOLUTE: 0.1 K/CU MM
BASOPHILS RELATIVE PERCENT: 0.9 % (ref 0–1)
BILIRUB SERPL-MCNC: 0.8 MG/DL (ref 0–1)
BUN BLDV-MCNC: 29 MG/DL (ref 6–23)
CALCIUM SERPL-MCNC: 9.8 MG/DL (ref 8.3–10.6)
CHLORIDE BLD-SCNC: 98 MMOL/L (ref 99–110)
CO2: 32 MMOL/L (ref 21–32)
CREAT SERPL-MCNC: 1.4 MG/DL (ref 0.9–1.3)
DIFFERENTIAL TYPE: ABNORMAL
EKG ATRIAL RATE: 62 BPM
EKG DIAGNOSIS: NORMAL
EKG P AXIS: -14 DEGREES
EKG Q-T INTERVAL: 456 MS
EKG QRS DURATION: 94 MS
EKG QTC CALCULATION (BAZETT): 462 MS
EKG R AXIS: 48 DEGREES
EKG T AXIS: 24 DEGREES
EKG VENTRICULAR RATE: 62 BPM
EOSINOPHILS ABSOLUTE: 0.3 K/CU MM
EOSINOPHILS RELATIVE PERCENT: 5.2 % (ref 0–3)
FLUID TYPE: NORMAL INDEX
GFR AFRICAN AMERICAN: 60 ML/MIN/1.73M2
GFR NON-AFRICAN AMERICAN: 50 ML/MIN/1.73M2
GLUCOSE BLD-MCNC: 104 MG/DL (ref 70–99)
GLUCOSE, FLUID: 115 MG/DL
HCT VFR BLD CALC: 43.4 % (ref 42–52)
HEMOGLOBIN: 13.5 GM/DL (ref 13.5–18)
IMMATURE NEUTROPHIL %: 0.2 % (ref 0–0.43)
INR BLD: 1.08 INDEX
LACTATE DEHYDROGENASE, FLUID: 56 IU/L
LIPASE: 30 IU/L (ref 13–60)
LYMPHOCYTES ABSOLUTE: 0.8 K/CU MM
LYMPHOCYTES RELATIVE PERCENT: 12.9 % (ref 24–44)
LYMPHOCYTES, BODY FLUID: 55 %
MCH RBC QN AUTO: 32.8 PG (ref 27–31)
MCHC RBC AUTO-ENTMCNC: 31.1 % (ref 32–36)
MCV RBC AUTO: 105.6 FL (ref 78–100)
MESOTHELIAL FLUID: 0 /100 WBC
MONOCYTE, FLUID: 29 %
MONOCYTES ABSOLUTE: 0.6 K/CU MM
MONOCYTES RELATIVE PERCENT: 10.3 % (ref 0–4)
NEUTROPHIL, FLUID: 16 %
NUCLEATED RBC %: 0 %
OTHER CELLS FLUID: 0
PDW BLD-RTO: 14.9 % (ref 11.7–14.9)
PLATELET # BLD: 154 K/CU MM (ref 140–440)
PMV BLD AUTO: 11.5 FL (ref 7.5–11.1)
POTASSIUM SERPL-SCNC: 3.6 MMOL/L (ref 3.5–5.1)
PRO-BNP: 1470 PG/ML
PROTEIN FLUID: 2.7 GM/DL
PROTHROMBIN TIME: 13.9 SECONDS (ref 11.7–14.5)
RBC # BLD: 4.11 M/CU MM (ref 4.6–6.2)
RBC FLUID: 161 /CU MM
REASON FOR REJECTION: NORMAL
REJECTED TEST: NORMAL
SARS-COV-2, NAAT: NOT DETECTED
SEGMENTED NEUTROPHILS ABSOLUTE COUNT: 4.1 K/CU MM
SEGMENTED NEUTROPHILS RELATIVE PERCENT: 70.5 % (ref 36–66)
SODIUM BLD-SCNC: 140 MMOL/L (ref 135–145)
SOURCE FLUID: NORMAL
SOURCE: NORMAL
TOTAL IMMATURE NEUTOROPHIL: 0.01 K/CU MM
TOTAL NUCLEATED RBC: 0 K/CU MM
TOTAL PROTEIN: 8.7 GM/DL (ref 6.4–8.2)
TROPONIN T: 0.02 NG/ML
TROPONIN T: 0.03 NG/ML
WBC # BLD: 5.8 K/CU MM (ref 4–10.5)
WBC FLUID: 238 /CU MM

## 2022-06-21 PROCEDURE — 93005 ELECTROCARDIOGRAM TRACING: CPT | Performed by: STUDENT IN AN ORGANIZED HEALTH CARE EDUCATION/TRAINING PROGRAM

## 2022-06-21 PROCEDURE — 1200000000 HC SEMI PRIVATE

## 2022-06-21 PROCEDURE — 32555 ASPIRATE PLEURA W/ IMAGING: CPT

## 2022-06-21 PROCEDURE — 84484 ASSAY OF TROPONIN QUANT: CPT

## 2022-06-21 PROCEDURE — 83690 ASSAY OF LIPASE: CPT

## 2022-06-21 PROCEDURE — 89051 BODY FLUID CELL COUNT: CPT

## 2022-06-21 PROCEDURE — 87635 SARS-COV-2 COVID-19 AMP PRB: CPT

## 2022-06-21 PROCEDURE — 6360000002 HC RX W HCPCS: Performed by: PHYSICIAN ASSISTANT

## 2022-06-21 PROCEDURE — 6370000000 HC RX 637 (ALT 250 FOR IP): Performed by: PHYSICIAN ASSISTANT

## 2022-06-21 PROCEDURE — 80053 COMPREHEN METABOLIC PANEL: CPT

## 2022-06-21 PROCEDURE — 87205 SMEAR GRAM STAIN: CPT

## 2022-06-21 PROCEDURE — 84157 ASSAY OF PROTEIN OTHER: CPT

## 2022-06-21 PROCEDURE — 85730 THROMBOPLASTIN TIME PARTIAL: CPT

## 2022-06-21 PROCEDURE — 82140 ASSAY OF AMMONIA: CPT

## 2022-06-21 PROCEDURE — 71045 X-RAY EXAM CHEST 1 VIEW: CPT

## 2022-06-21 PROCEDURE — 36415 COLL VENOUS BLD VENIPUNCTURE: CPT

## 2022-06-21 PROCEDURE — C1729 CATH, DRAINAGE: HCPCS

## 2022-06-21 PROCEDURE — 2709999900 HC NON-CHARGEABLE SUPPLY

## 2022-06-21 PROCEDURE — 82042 OTHER SOURCE ALBUMIN QUAN EA: CPT

## 2022-06-21 PROCEDURE — 99285 EMERGENCY DEPT VISIT HI MDM: CPT

## 2022-06-21 PROCEDURE — 85025 COMPLETE CBC W/AUTO DIFF WBC: CPT

## 2022-06-21 PROCEDURE — 83615 LACTATE (LD) (LDH) ENZYME: CPT

## 2022-06-21 PROCEDURE — 2580000003 HC RX 258: Performed by: PHYSICIAN ASSISTANT

## 2022-06-21 PROCEDURE — 83880 ASSAY OF NATRIURETIC PEPTIDE: CPT

## 2022-06-21 PROCEDURE — 0W993ZZ DRAINAGE OF RIGHT PLEURAL CAVITY, PERCUTANEOUS APPROACH: ICD-10-PCS | Performed by: RADIOLOGY

## 2022-06-21 PROCEDURE — 85610 PROTHROMBIN TIME: CPT

## 2022-06-21 PROCEDURE — 82150 ASSAY OF AMYLASE: CPT

## 2022-06-21 PROCEDURE — 82945 GLUCOSE OTHER FLUID: CPT

## 2022-06-21 PROCEDURE — 87070 CULTURE OTHR SPECIMN AEROBIC: CPT

## 2022-06-21 PROCEDURE — 93010 ELECTROCARDIOGRAM REPORT: CPT | Performed by: INTERNAL MEDICINE

## 2022-06-21 RX ORDER — SODIUM CHLORIDE 9 MG/ML
INJECTION, SOLUTION INTRAVENOUS PRN
Status: DISCONTINUED | OUTPATIENT
Start: 2022-06-21 | End: 2022-06-26 | Stop reason: HOSPADM

## 2022-06-21 RX ORDER — DULOXETIN HYDROCHLORIDE 30 MG/1
60 CAPSULE, DELAYED RELEASE ORAL DAILY
Status: DISCONTINUED | OUTPATIENT
Start: 2022-06-22 | End: 2022-06-26 | Stop reason: HOSPADM

## 2022-06-21 RX ORDER — LACTULOSE 10 G/15ML
20 SOLUTION ORAL; RECTAL 3 TIMES DAILY
Status: DISCONTINUED | OUTPATIENT
Start: 2022-06-21 | End: 2022-06-22

## 2022-06-21 RX ORDER — ALBUTEROL SULFATE 90 UG/1
2 AEROSOL, METERED RESPIRATORY (INHALATION) 4 TIMES DAILY PRN
Status: DISCONTINUED | OUTPATIENT
Start: 2022-06-21 | End: 2022-06-23

## 2022-06-21 RX ORDER — ESOMEPRAZOLE MAGNESIUM 40 MG/1
20 FOR SUSPENSION ORAL DAILY
Status: DISCONTINUED | OUTPATIENT
Start: 2022-06-21 | End: 2022-06-21 | Stop reason: CLARIF

## 2022-06-21 RX ORDER — ONDANSETRON 2 MG/ML
4 INJECTION INTRAMUSCULAR; INTRAVENOUS EVERY 6 HOURS PRN
Status: DISCONTINUED | OUTPATIENT
Start: 2022-06-21 | End: 2022-06-26 | Stop reason: HOSPADM

## 2022-06-21 RX ORDER — SPIRONOLACTONE 50 MG/1
50 TABLET, FILM COATED ORAL DAILY
Status: DISCONTINUED | OUTPATIENT
Start: 2022-06-22 | End: 2022-06-26 | Stop reason: HOSPADM

## 2022-06-21 RX ORDER — FUROSEMIDE 40 MG/1
40 TABLET ORAL 2 TIMES DAILY
Status: DISCONTINUED | OUTPATIENT
Start: 2022-06-21 | End: 2022-06-26 | Stop reason: HOSPADM

## 2022-06-21 RX ORDER — FUROSEMIDE 20 MG/1
80 TABLET ORAL 2 TIMES DAILY
Status: CANCELLED | OUTPATIENT
Start: 2022-06-21

## 2022-06-21 RX ORDER — POLYETHYLENE GLYCOL 3350 17 G/17G
17 POWDER, FOR SOLUTION ORAL DAILY PRN
Status: DISCONTINUED | OUTPATIENT
Start: 2022-06-21 | End: 2022-06-26 | Stop reason: HOSPADM

## 2022-06-21 RX ORDER — ACETAMINOPHEN 325 MG/1
650 TABLET ORAL EVERY 6 HOURS PRN
Status: DISCONTINUED | OUTPATIENT
Start: 2022-06-21 | End: 2022-06-26 | Stop reason: HOSPADM

## 2022-06-21 RX ORDER — ACETAMINOPHEN 650 MG/1
650 SUPPOSITORY RECTAL EVERY 6 HOURS PRN
Status: DISCONTINUED | OUTPATIENT
Start: 2022-06-21 | End: 2022-06-26 | Stop reason: HOSPADM

## 2022-06-21 RX ORDER — ENOXAPARIN SODIUM 100 MG/ML
40 INJECTION SUBCUTANEOUS DAILY
Status: DISCONTINUED | OUTPATIENT
Start: 2022-06-21 | End: 2022-06-26 | Stop reason: HOSPADM

## 2022-06-21 RX ORDER — SODIUM CHLORIDE 0.9 % (FLUSH) 0.9 %
10 SYRINGE (ML) INJECTION EVERY 12 HOURS SCHEDULED
Status: DISCONTINUED | OUTPATIENT
Start: 2022-06-21 | End: 2022-06-26 | Stop reason: HOSPADM

## 2022-06-21 RX ORDER — LEVOTHYROXINE SODIUM 0.05 MG/1
50 TABLET ORAL DAILY
Status: DISCONTINUED | OUTPATIENT
Start: 2022-06-22 | End: 2022-06-26 | Stop reason: HOSPADM

## 2022-06-21 RX ORDER — LACTULOSE 10 G/15ML
20 SOLUTION ORAL 3 TIMES DAILY
Status: DISCONTINUED | OUTPATIENT
Start: 2022-06-21 | End: 2022-06-22

## 2022-06-21 RX ORDER — SODIUM CHLORIDE 0.9 % (FLUSH) 0.9 %
10 SYRINGE (ML) INJECTION PRN
Status: DISCONTINUED | OUTPATIENT
Start: 2022-06-21 | End: 2022-06-26 | Stop reason: HOSPADM

## 2022-06-21 RX ORDER — LACTULOSE 10 G/15ML
20 SOLUTION ORAL 3 TIMES DAILY
Status: DISCONTINUED | OUTPATIENT
Start: 2022-06-21 | End: 2022-06-21

## 2022-06-21 RX ORDER — PANTOPRAZOLE SODIUM 40 MG/1
40 TABLET, DELAYED RELEASE ORAL DAILY
Status: DISCONTINUED | OUTPATIENT
Start: 2022-06-21 | End: 2022-06-26 | Stop reason: HOSPADM

## 2022-06-21 RX ORDER — ONDANSETRON 4 MG/1
4 TABLET, ORALLY DISINTEGRATING ORAL EVERY 8 HOURS PRN
Status: DISCONTINUED | OUTPATIENT
Start: 2022-06-21 | End: 2022-06-26 | Stop reason: HOSPADM

## 2022-06-21 RX ADMIN — LACTULOSE 20 G: 10 SOLUTION ORAL at 21:46

## 2022-06-21 RX ADMIN — ENOXAPARIN SODIUM 40 MG: 100 INJECTION SUBCUTANEOUS at 18:46

## 2022-06-21 RX ADMIN — PANTOPRAZOLE SODIUM 40 MG: 40 TABLET, DELAYED RELEASE ORAL at 21:46

## 2022-06-21 RX ADMIN — LACTULOSE 20 G: 10 SOLUTION ORAL at 18:39

## 2022-06-21 RX ADMIN — RIFAXIMIN 550 MG: 550 TABLET ORAL at 21:46

## 2022-06-21 RX ADMIN — FUROSEMIDE 40 MG: 40 TABLET ORAL at 21:46

## 2022-06-21 RX ADMIN — SODIUM CHLORIDE, PRESERVATIVE FREE 10 ML: 5 INJECTION INTRAVENOUS at 21:48

## 2022-06-21 ASSESSMENT — PAIN SCALES - GENERAL: PAINLEVEL_OUTOF10: 0

## 2022-06-21 NOTE — ED NOTES
Hand off report to Brandee [address] for room [address] [facility], RN  06/21/22 8145

## 2022-06-21 NOTE — TELEPHONE ENCOUNTER
They were labs are actually pretty stable but wife and patient were instructed to go to the emergency room if his shortness of breath and confusion worsen. Which I think they just want by looking at the chart. I do not disagree with that.   If they go to the emergency room and appropriately treated will not need any chest x-ray or outpatient CT

## 2022-06-21 NOTE — PROGRESS NOTES
PROCEDURE PERFORMED: RIGHT THORACENTESIS    PRIMARY INDICATION FOR PROCEDURE: RIGHT PLEURAL EFFUSION    INFORMED CONSENT:  Obtained prior to procedure. Consent placed in chart. JOVITA SCORE PRE PROCEDURE: 9       PT TRANSPORTED FROM: ED 27                                   TO THE IR ROOM: @1628                       ASSESSMENT: SOB    BARRIER PRECAUTIONS & STERILE TECHNIQUE:               Pt  positioned for comfort. Warm blankets given. Pt placed on Cardiac Monitor. Pt and draped in a sterile fashion with chlorhexadine.   Timeout: [facility]  Staff: Dr Leidy Castaneda RN  PAIN/LOCAL ANESTHESIA/SEDATION MANAGEMENT:           Local: Lidocaine 1% given by Dr. Emeli Soni          Sedation:              Fentanyl:             Versed:     INTRAOPERATIVE:           ACCESS TIME: 1637          US IMAGES: 4          ONE SATEP           FINAL IMAGE TAKEN TO CONFIRM PLACEMENT OF: CATHETER          CONTRAST/CC:     STERILE DRESSINGS: APPLIED BY MADY RT    SPECIMENS: 750 ml of chyle pleural fluid removed and sent to the lab  2000 Ml total fluid removed    EBL:  >1CC        FOLLOW- UP X-RAY: yes    COMPLICATIONS:NA    JOVITA SCORE POST PROCEDURE: 9         REPORT CALLED TO: Francoise Thorpe RN

## 2022-06-21 NOTE — H&P
History and Physical      Name:  Raul Molina /Age/Sex: 1945  (78 y.o. male)   MRN & CSN:  [id number] & [id number] Encounter Date/Time: 2022 4:42 PM EDT   Location:  ED27/ED-27 PCP: Constantine Diaz MD       Hospital Day: 1    Assessment and Plan:     Raul Molina is a 78 y.o. male who presents with dyspnea    Medical decision making:   Acute on chronic hypoxic respiratory failure secondary to recurrent right-sided pleural effusion  o Likely secondary to cirrhosis  o IR consulted by ER, patient went directly to have thoracentesis where they removed 2L of fluid  o Telemetry and pulse oximetry monitoring  o Monitor in stepdown overnight given comorbidities, transition out to med telemetry when able  o De-escalate nasal cannula oxygen to baseline of 2 L when able  o Echo on 22 with EF 50-55%   Cirrhosis with hyperammonia  o Low suspicion for SBP: no fevers, significant abdominal distension, or leukocytosis  o May need paracentesis during hospitalization, may need to reconsult IR  o Trend CMP   o Paracentesis fluid studies are pending, follow  o Continue home Lasix switched to IV, spironolactone, Xifaxan  o Hold anticoagulation until definitive plan for paracentesis, use SCDs  o GI consult: Dr. Kathie Duran Chronic transaminitis  o Lactulose TID  o Trend ammonia  o Daily INR/CMP   CKD  o Baseline fluctuates between 1.0 and 1.6  o Monitor for worsening creatinine   Detectable troponin  o Trend x3, may be secondary to fluid overload/CANDIE   Anxiety  o Continue cymbalta   Hypothyroidism  o Continue Synthroid   Obesity BMI 44   GERD   History of pulmonary nodules   Obstructive sleep apnea   Hyperlipidemia   History of esophageal varices   Type 2 diabetes  o Fairly controlled, last hemoglobin A1C 6.1 in   o Repeat A1C pending.  Monitor, add SSI if needed     Disposition:   Current Living situation: home  Expected Disposition: home  Estimated D/C: TBD    Diet regular   DVT Prophylaxis [x] Lovenox, []  Heparin, [] SCDs, [] Ambulation,  [] Eliquis, [] Xarelto   Code Status full   Surrogate Decision Maker/ POA unknown     History from:   Patient and EMR   History of Present Illness:   Chief Complaint: abdominal pain  Ori Mondragon is a 78 y.o. male with pmh of type 2 diabetes, esophageal varices, hyperlipidemia, JENNIFER, pulmonary nodules, GERD, obesity, hypothyroidism, anxiety, CKD who presents to the ER for evaluation of increasing dyspnea. Patient was recently admitted on 5/2/2022 and discharged to rehab on 5/18/2022, admitted for hepatic encephalopathy. During his hospitalization he did have a paracentesis and 7 L removed on 5/5/2022. Patient had a thoracentesis on 5/12/2022, and he required intubation and ventilator from 5/5 through 5/11/2022. Patient was discharged to the ARU from 5/18 through 5/27 and then discharged to home. Patient has had about a 25 lbs weight gain since his discharge and has become progressively confused per his wife who provides most of the history. Patient himself is unable to provide much history as he is confused. She states that he has been compliant with his medications. She states he missed an appointment with Dr. Kathe Harper due to his \"sleepiness \". She states he wears 2 L of oxygen at baseline. She denies any fevers or chills he is exhibited. He has not mentioned any chest pain, dysuria, or headaches. Patient's past medical, social, and family history have been reviewed. Patient case discussed with ED provider Dr. Anabelle Zuniga of Systems:    10 point system reviewed, negative, unless as noted in above HPI.   Objective:   No intake or output data in the 24 hours ending 06/21/22 1642   Vitals:   Vitals:    06/21/22 1430 06/21/22 1500 06/21/22 1530   BP: (!) 153/83 (!) 143/72 103/89   Pulse: 63 59 64   Resp: 21 19 20   Temp: 98.6 °F (37 °C)     SpO2: 99% 98% 100%     Medications Prior to Admission     Prior to Admission medications Medication Sig Start Date End Date Taking? Authorizing Provider   spironolactone (ALDACTONE) 100 MG tablet Take 0.5 tablets by mouth daily 5/27/22 6/26/22  MARK Vidal MD   furosemide (LASIX) 40 MG tablet Take 1 tablet by mouth daily  Patient taking differently: Take 80 mg by mouth 2 times daily  5/27/22   MARK Vidal MD   rifAXIMin Frna Swift) 550 MG tablet Take 1 tablet by mouth 2 times daily 5/26/22   MARK Vidal MD   levothyroxine (SYNTHROID) 50 MCG tablet Take 1 tablet by mouth Daily 3/30/22 9/26/22  Faina Lacy MD   lactulose [facility]) 10 GM/15ML solution Take 30 mLs by mouth 3 times daily Hold if having diarrhea 3/21/22   Shantanu Doe MD   DULoxetine (CYMBALTA) 60 MG extended release capsule TAKE 1 CAPSULE BY MOUTH ONCE DAILY 3/11/22 1/13/23  Faina Lacy MD   nadolol (CORGARD) 40 MG tablet Take 1 tablet by mouth daily 11/18/21 6/8/22  Faina Lacy MD   b complex vitamins capsule Take 1 capsule by mouth daily    Historical Provider, MD   albuterol sulfate HFA (VENTOLIN HFA) 108 (90 Base) MCG/ACT inhaler Inhale 2 puffs into the lungs 4 times daily as needed for Wheezing 8/30/21   Faina Lacy MD   tadalafil (CIALIS) 20 MG tablet Take 20 mg by mouth every 3 days     Historical Provider, MD   vitamin E 400 UNIT capsule Take 800 Units by mouth daily    Historical Provider, MD   magnesium oxide (MAG-OX) 400 MG tablet Take 200 mg by mouth 2 times daily OTC 1 qd 10/15/19   Historical Provider, MD   Multiple Vitamin (MULTI VITAMIN MENS PO) Take 1 tablet by mouth daily    Historical Provider, MD   ESOMEPRAZOLE MAGNESIUM PO Take 20 mg by mouth daily OTC    Historical Provider, MD   [place]-3 1000 MG CAPS Take 1 capsule by mouth daily    Historical Provider, MD     Physical Exam:      GEN -Awake but sleepy. Obese  EYES -PERRLA. No scleral erythema, discharge, or conjunctivitis.   HENT -MM are moist. Oral pharynx without exudates, no evidence of thrush. NECK -Supple, no apparent thyromegaly or masses. RESP -diminished throughout bilateral lung fields. Symmetric chest movement while on 4 L nasal cannula. C/V -S1/S2 auscultated. RRR without appreciable M/R/G. No JVD or carotid bruits. Peripheral pulses equal bilaterally and palpable. Cap refill <3 sec. 2+ pitting edema bilateral lower extremities  GI -Abdomen is soft, mildly distended and without significant tenderness to palpation. + BS. Fluid wave is present.  -No CVA/ flank tenderness. Schilling catheter is not present. LYMPH-No palpable cervical lymphadenopathy and no hepatosplenomegaly. No petechiae or ecchymoses. MS -No gross joint deformities. SKIN -Normal coloration, warm, dry. Pauly Leyva, alert, oriented x  person, place, but not time. Cranial nerves appear grossly intact, normal speech, no lateralizing weakness. PSYC- Appropriate affect. Past Medical History:   PMHx   Past Medical History:   Diagnosis Date    Anxiety     [facility], [facility]     Diabetes mellitus ([facility]     GERD (gastroesophageal reflux disease) 2000    GERD (gastroesophageal reflux disease)     Hyperlipidemia     Hypertension     Portal hypertension (HCC)     Pulmonary nodules     Sleep apnea     SOB (shortness of breath)     Thyroid disease      PSHX:  has a past surgical history that includes Cholecystectomy; Vasectomy; Colonoscopy; Endoscopy, colon, diagnostic; Foot surgery; Upper gastrointestinal endoscopy (N/A, 1/19/2022); Tonsillectomy; and Appendectomy. Allergies:    Allergies   Allergen Reactions    Lisinopril Swelling     Tongue swelling      Zetia [Ezetimibe] Swelling     Facial swelling    Atorvastatin     Codeine Other (See Comments)     Blood pressure drops    Hydrochlorothiazide     Hydroxyzine      Fatigue and depressed    Lexapro [Escitalopram Oxalate]      Increased depression    Pravastatin      Fam HX: family history includes Diabetes in an other family member; Hypertension in his brother. Soc HX:   Social History     Socioeconomic History    Marital status:      Spouse name: Not on file    Number of children: Not on file    Years of education: Not on file    Highest education level: Not on file   Occupational History     Comment: Retired    Tobacco Use    Smoking status: Former Smoker     Packs/day: 1.00     Years: 14.00     Pack years: 14.00     Quit date:      Years since quittin.5    Smokeless tobacco: Never Used   Vaping Use    Vaping Use: Never used   Substance and Sexual Activity    Alcohol use: Not Currently    Drug use: Never    Sexual activity: Yes     Partners: Female   Other Topics Concern    Not on file   Social History Narrative    Not on file     Social Determinants of Health     Financial Resource Strain: Low Risk     Difficulty of Paying Living Expenses: Not hard at all   Food Insecurity: No Food Insecurity    Worried About [employer] in the Last Year: Never true    [employer] in the Last Year: Never true   Transportation Needs:     Lack of Transportation (Medical): Not on file    Lack of Transportation (Non-Medical):  Not on file   Physical Activity:     Days of Exercise per Week: Not on file    Minutes of Exercise per Session: Not on file   Stress:     Feeling of Stress : Not on file   Social Connections:     Frequency of Communication with Friends and Family: Not on file    Frequency of Social Gatherings with Friends and Family: Not on file    Attends Mu-ism Services: Not on file    Active Member of Clubs or Organizations: Not on file    Attends Club or Organization Meetings: Not on file    Marital Status: Not on file   Intimate Partner Violence:     Fear of Current or Ex-Partner: Not on file    Emotionally Abused: Not on file    Physically Abused: Not on file    Sexually Abused: Not on file   Housing Stability:     Unable to Pay for Housing in the Last Year: Not on file    Number of Places Lived in the Last Year: Not on file    Unstable Housing in the Last Year: Not on file     Medications:   Medications:    Infusions:   PRN Meds:   Labs    XR CHEST PORTABLE    Result Date: 6/21/2022  EXAMINATION: ONE XRAY VIEW OF THE CHEST 6/21/2022 2:35 pm COMPARISON: 05/19/2022 HISTORY: ORDERING SYSTEM PROVIDED HISTORY: sob, hx cirrhosis and effusions TECHNOLOGIST PROVIDED HISTORY: Reason for exam:->sob, hx cirrhosis and effusions Reason for Exam: sob, hx cirrhosis and effusions Additional signs and symptoms: na Relevant Medical/Surgical History: diabetes, hypertension, alcoholism FINDINGS: Heart size stable. Right hemithorax totally opacified with shift of the mediastinal structures to the left. No focal airspace disease seen in the left lung. Probable large right pleural effusion with shift of the mediastinal structures to the left side and associated volume loss in the right lung       CBC:   Recent Labs     06/20/22  1500 06/21/22  1442   WBC 5.0 5.8   HGB 12.6* 13.5    154     BMP:    Recent Labs     06/20/22  1500 06/21/22  1442    140   K 4.8 3.6   CL 97* 98*   CO2 34* 32   BUN 24* 29*   CREATININE 1.5* 1.4*   GLUCOSE 103* 104*     Hepatic:   Recent Labs     06/20/22  1500 06/21/22  1442   AST 55* 52*   ALT 41* 41*   BILITOT 0.5 0.8   ALKPHOS 143* 152*     Lipids:   Lab Results   Component Value Date    CHOL 174 05/07/2022    CHOL 230 10/14/2019    HDL 28 05/07/2022    TRIG 130 05/07/2022     Hemoglobin A1C:   Lab Results   Component Value Date    LABA1C 6.1 08/09/2021     TSH:   Lab Results   Component Value Date    TSH 1.39 10/14/2019     Troponin:   Lab Results   Component Value Date    TROPONINT 0.020 06/21/2022    TROPONINT 0.022 05/02/2022    TROPONINT 0.012 02/11/2022     Lactic Acid: No results for input(s): LACTA in the last 72 hours.   BNP:   Recent Labs     06/21/22  1442   PROBNP 1,470*     UA:  Lab Results   Component Value Date    NITRU NEGATIVE 05/23/2022    COLORU YELLOW 05/23/2022    WBCUA 2 05/23/2022    RBCUA 4 05/23/2022    MUCUS RARE 05/23/2022    TRICHOMONAS NONE SEEN 05/23/2022    BACTERIA RARE 05/23/2022    CLARITYU CLEAR 05/23/2022    SPECGRAV 1.020 05/23/2022    LEUKOCYTESUR TRACE 05/23/2022    UROBILINOGEN 0.2 05/23/2022    BILIRUBINUR NEGATIVE 05/23/2022    BLOODU MODERATE 05/23/2022    KETUA NEGATIVE 05/23/2022     Urine Cultures: No results found for: James Patel  Blood Cultures: No results found for: BC  No results found for: BLOODCULT2  Organism: No results found for: ORG  Imaging/Diagnostics Last 24 Hours   XR CHEST PORTABLE    Result Date: 6/21/2022  EXAMINATION: ONE XRAY VIEW OF THE CHEST 6/21/2022 2:35 pm COMPARISON: 05/19/2022 HISTORY: ORDERING SYSTEM PROVIDED HISTORY: sob, hx cirrhosis and effusions TECHNOLOGIST PROVIDED HISTORY: Reason for exam:->sob, hx cirrhosis and effusions Reason for Exam: sob, hx cirrhosis and effusions Additional signs and symptoms: na Relevant Medical/Surgical History: diabetes, hypertension, alcoholism FINDINGS: Heart size stable. Right hemithorax totally opacified with shift of the mediastinal structures to the left. No focal airspace disease seen in the left lung.      Probable large right pleural effusion with shift of the mediastinal structures to the left side and associated volume loss in the right lung       Personally reviewed Lab Studies, Imaging, and discussed case with Dr. Kristy Beltran PA-C  Hospitalist  6/21/2022, 4:42 PM

## 2022-06-21 NOTE — TELEPHONE ENCOUNTER
Call patients wife Eliazar Mccullough with lab results. Was patient to get a Chest X-ray and a CT of the ABD.

## 2022-06-21 NOTE — PROGRESS NOTES
4 Eyes Skin Assessment     NAME:  Doni Pacheco OF BIRTH:  1945  MEDICAL RECORD NUMBER:  [id number]    The patient is being assess for  Admission    I agree that 2 RN's have performed a thorough Head to Toe Skin Assessment on the patient. ALL assessment sites listed below have been assessed. Areas assessed by both nurses:    Head, Face, Ears, Shoulders, Back, Chest, Arms, Elbows, Hands, Sacrum. Buttock, Coccyx, Ischium and Legs. Feet and Heels        Does the Patient have a Wound?  No noted wound(s)       Andre Prevention initiated:  Yes   Wound Care Orders initiated:  No    Pressure Injury (Stage 3,4, Unstageable, DTI, NWPT, and Complex wounds) if present place consult order under [de-identified] No    New and Established Ostomies if present place consult order under : No      Nurse 1 eSignature: Electronically signed by Damon Esteban RN on 6/21/22 at 6:11 PM EDT    **SHARE this note so that the co-signing nurse is able to place an eSignature**    Nurse 2 eSignature: Mercedes tech

## 2022-06-21 NOTE — CARE COORDINATION
Patient identified as potential readmission. Last admission 5/2-5/18 for recurrent right pleural effusion. Patient was transferred to ARU 5/18-5/27. Patient here today for shortness of breath. CM met with patient to begin discharge planning. CM introduced self and CM role. Patient's spouse Basimley Essex also at bedside. Permission obtained to speak freely. Patient presents to ER with c/o fluid retention and recent weight gain. Hanley Essex reports that patient had fluid drained last month. Patient from home with spouse in a single story home in [place]. Patient has a PCP and insurance which assists with medication affordability. Patient uses the following DME: walker, O2 at 2 LPM. Patient active with [facility] with PT/OT social work and nursing visits once weekly. Patient does not drive, but depends on Hanley Essex for transportation to and from [address] [place] [address]. Patient is requiring readmission and there were no alternatives to admission to explore at this time. Patient plan upon discharge is to return home with spouse.

## 2022-06-21 NOTE — ED NOTES
Patient returning from IR,informed they removed 2 liters of fluid during his thoracentesis      Angela Clark RN  06/21/22 7611

## 2022-06-21 NOTE — ED TRIAGE NOTES
To ED for increased shortness of breath,family states he has a thoracentesis and paracentesis last month and now his abd is swelling again,patient is also not eating

## 2022-06-22 ENCOUNTER — APPOINTMENT (OUTPATIENT)
Dept: INTERVENTIONAL RADIOLOGY/VASCULAR | Age: 77
DRG: 432 | End: 2022-06-22
Payer: MEDICARE

## 2022-06-22 LAB
ALBUMIN FLUID: 0.2 GM/DL
ALBUMIN SERPL-MCNC: 2.9 GM/DL (ref 3.4–5)
ALP BLD-CCNC: 141 IU/L (ref 40–129)
ALT SERPL-CCNC: 36 U/L (ref 10–40)
AMMONIA: 101 UMOL/L (ref 16–60)
AMYLASE FLUID: 13 U/L
ANION GAP SERPL CALCULATED.3IONS-SCNC: 7 MMOL/L (ref 4–16)
APTT: 35.9 SECONDS (ref 25.1–37.1)
AST SERPL-CCNC: 49 IU/L (ref 15–37)
BACTERIA: NEGATIVE /HPF
BASE EXCESS MIXED: 6.8 (ref 0–1.2)
BASOPHILS ABSOLUTE: 0.1 K/CU MM
BASOPHILS RELATIVE PERCENT: 0.8 % (ref 0–1)
BILIRUB SERPL-MCNC: 0.9 MG/DL (ref 0–1)
BILIRUBIN URINE: NEGATIVE MG/DL
BLOOD, URINE: ABNORMAL
BUN BLDV-MCNC: 30 MG/DL (ref 6–23)
CALCIUM SERPL-MCNC: 9.5 MG/DL (ref 8.3–10.6)
CARBON MONOXIDE, BLOOD: 2.3 % (ref 0–5)
CHLORIDE BLD-SCNC: 99 MMOL/L (ref 99–110)
CLARITY: CLEAR
CO2 CONTENT: 38.1 MMOL/L (ref 19–24)
CO2: 36 MMOL/L (ref 21–32)
COLOR: YELLOW
COMMENT: ABNORMAL
CREAT SERPL-MCNC: 1.4 MG/DL (ref 0.9–1.3)
DIFFERENTIAL TYPE: ABNORMAL
EKG ATRIAL RATE: 65 BPM
EKG DIAGNOSIS: NORMAL
EKG P AXIS: -12 DEGREES
EKG P-R INTERVAL: 152 MS
EKG Q-T INTERVAL: 428 MS
EKG QRS DURATION: 82 MS
EKG QTC CALCULATION (BAZETT): 445 MS
EKG R AXIS: 43 DEGREES
EKG T AXIS: 12 DEGREES
EKG VENTRICULAR RATE: 65 BPM
EOSINOPHILS ABSOLUTE: 0.3 K/CU MM
EOSINOPHILS RELATIVE PERCENT: 5.1 % (ref 0–3)
ESTIMATED AVERAGE GLUCOSE: 111 MG/DL
FLUID TYPE: NORMAL INDEX
GFR AFRICAN AMERICAN: 60 ML/MIN/1.73M2
GFR NON-AFRICAN AMERICAN: 50 ML/MIN/1.73M2
GLUCOSE BLD-MCNC: 99 MG/DL (ref 70–99)
GLUCOSE, FLUID: 114 MG/DL
GLUCOSE, URINE: NEGATIVE MG/DL
HBA1C MFR BLD: 5.5 % (ref 4.2–6.3)
HCO3 ARTERIAL: 35.9 MMOL/L (ref 18–23)
HCT VFR BLD CALC: 41 % (ref 42–52)
HEMOGLOBIN: 12.8 GM/DL (ref 13.5–18)
HYALINE CASTS: 5 /LPF
IMMATURE NEUTROPHIL %: 0.2 % (ref 0–0.43)
INR BLD: 1.04 INDEX
KETONES, URINE: NEGATIVE MG/DL
LACTATE DEHYDROGENASE, FLUID: 35 IU/L
LEUKOCYTE ESTERASE, URINE: NEGATIVE
LYMPHOCYTES ABSOLUTE: 0.9 K/CU MM
LYMPHOCYTES RELATIVE PERCENT: 15.1 % (ref 24–44)
LYMPHOCYTES, BODY FLUID: 50 %
MCH RBC QN AUTO: 32.5 PG (ref 27–31)
MCHC RBC AUTO-ENTMCNC: 31.2 % (ref 32–36)
MCV RBC AUTO: 104.1 FL (ref 78–100)
MESOTHELIAL FLUID: 4 /100 WBC
METHEMOGLOBIN ARTERIAL: 1.3 %
MONOCYTE, FLUID: 40 %
MONOCYTES ABSOLUTE: 0.9 K/CU MM
MONOCYTES RELATIVE PERCENT: 14.2 % (ref 0–4)
MUCUS: ABNORMAL HPF
NEUTROPHIL, FLUID: 10 %
NITRITE URINE, QUANTITATIVE: NEGATIVE
NUCLEATED RBC %: 0 %
O2 SATURATION: 92.4 % (ref 96–97)
PCO2 ARTERIAL: 73 MMHG (ref 32–45)
PDW BLD-RTO: 14.7 % (ref 11.7–14.9)
PH BLOOD: 7.3 (ref 7.34–7.45)
PH, URINE: 5.5 (ref 5–8)
PLATELET # BLD: 174 K/CU MM (ref 140–440)
PMV BLD AUTO: 10.4 FL (ref 7.5–11.1)
PO2 ARTERIAL: 72 MMHG (ref 75–100)
POTASSIUM SERPL-SCNC: 3.9 MMOL/L (ref 3.5–5.1)
PROTEIN FLUID: 1.3 GM/DL
PROTEIN UA: NEGATIVE MG/DL
PROTHROMBIN TIME: 13.4 SECONDS (ref 11.7–14.5)
RBC # BLD: 3.94 M/CU MM (ref 4.6–6.2)
RBC FLUID: 256 /CU MM
RBC URINE: 1 /HPF (ref 0–3)
SEGMENTED NEUTROPHILS ABSOLUTE COUNT: 3.9 K/CU MM
SEGMENTED NEUTROPHILS RELATIVE PERCENT: 64.6 % (ref 36–66)
SODIUM BLD-SCNC: 142 MMOL/L (ref 135–145)
SOURCE FLUID: NORMAL
SPECIFIC GRAVITY UA: 1.02 (ref 1–1.03)
TOTAL IMMATURE NEUTOROPHIL: 0.01 K/CU MM
TOTAL NUCLEATED RBC: 0 K/CU MM
TOTAL PROTEIN: 7.1 GM/DL (ref 6.4–8.2)
TRICHOMONAS: ABNORMAL /HPF
UROBILINOGEN, URINE: 1 MG/DL (ref 0.2–1)
WBC # BLD: 6 K/CU MM (ref 4–10.5)
WBC FLUID: 221 /CU MM
WBC UA: ABNORMAL /HPF (ref 0–2)

## 2022-06-22 PROCEDURE — 80053 COMPREHEN METABOLIC PANEL: CPT

## 2022-06-22 PROCEDURE — 82042 OTHER SOURCE ALBUMIN QUAN EA: CPT

## 2022-06-22 PROCEDURE — 6370000000 HC RX 637 (ALT 250 FOR IP): Performed by: PHYSICIAN ASSISTANT

## 2022-06-22 PROCEDURE — P9047 ALBUMIN (HUMAN), 25%, 50ML: HCPCS | Performed by: INTERNAL MEDICINE

## 2022-06-22 PROCEDURE — C1729 CATH, DRAINAGE: HCPCS

## 2022-06-22 PROCEDURE — 36415 COLL VENOUS BLD VENIPUNCTURE: CPT

## 2022-06-22 PROCEDURE — 83036 HEMOGLOBIN GLYCOSYLATED A1C: CPT

## 2022-06-22 PROCEDURE — 6360000002 HC RX W HCPCS: Performed by: INTERNAL MEDICINE

## 2022-06-22 PROCEDURE — 87205 SMEAR GRAM STAIN: CPT

## 2022-06-22 PROCEDURE — 90632 HEPA VACCINE ADULT IM: CPT | Performed by: SPECIALIST

## 2022-06-22 PROCEDURE — 82945 GLUCOSE OTHER FLUID: CPT

## 2022-06-22 PROCEDURE — 49083 ABD PARACENTESIS W/IMAGING: CPT

## 2022-06-22 PROCEDURE — 94660 CPAP INITIATION&MGMT: CPT

## 2022-06-22 PROCEDURE — 6360000002 HC RX W HCPCS: Performed by: SPECIALIST

## 2022-06-22 PROCEDURE — 83615 LACTATE (LD) (LDH) ENZYME: CPT

## 2022-06-22 PROCEDURE — 81001 URINALYSIS AUTO W/SCOPE: CPT

## 2022-06-22 PROCEDURE — 82140 ASSAY OF AMMONIA: CPT

## 2022-06-22 PROCEDURE — 82150 ASSAY OF AMYLASE: CPT

## 2022-06-22 PROCEDURE — 84157 ASSAY OF PROTEIN OTHER: CPT

## 2022-06-22 PROCEDURE — 93010 ELECTROCARDIOGRAM REPORT: CPT | Performed by: INTERNAL MEDICINE

## 2022-06-22 PROCEDURE — 90471 IMMUNIZATION ADMIN: CPT | Performed by: SPECIALIST

## 2022-06-22 PROCEDURE — 6370000000 HC RX 637 (ALT 250 FOR IP): Performed by: INTERNAL MEDICINE

## 2022-06-22 PROCEDURE — 51702 INSERT TEMP BLADDER CATH: CPT

## 2022-06-22 PROCEDURE — 99222 1ST HOSP IP/OBS MODERATE 55: CPT | Performed by: SPECIALIST

## 2022-06-22 PROCEDURE — 93005 ELECTROCARDIOGRAM TRACING: CPT | Performed by: INTERNAL MEDICINE

## 2022-06-22 PROCEDURE — 85730 THROMBOPLASTIN TIME PARTIAL: CPT

## 2022-06-22 PROCEDURE — 0W9G3ZZ DRAINAGE OF PERITONEAL CAVITY, PERCUTANEOUS APPROACH: ICD-10-PCS | Performed by: RADIOLOGY

## 2022-06-22 PROCEDURE — 82803 BLOOD GASES ANY COMBINATION: CPT

## 2022-06-22 PROCEDURE — 2709999900 HC NON-CHARGEABLE SUPPLY

## 2022-06-22 PROCEDURE — 87070 CULTURE OTHR SPECIMN AEROBIC: CPT

## 2022-06-22 PROCEDURE — 89051 BODY FLUID CELL COUNT: CPT

## 2022-06-22 PROCEDURE — 90744 HEPB VACC 3 DOSE PED/ADOL IM: CPT | Performed by: SPECIALIST

## 2022-06-22 PROCEDURE — 2580000003 HC RX 258: Performed by: PHYSICIAN ASSISTANT

## 2022-06-22 PROCEDURE — G0010 ADMIN HEPATITIS B VACCINE: HCPCS | Performed by: SPECIALIST

## 2022-06-22 PROCEDURE — 88305 TISSUE EXAM BY PATHOLOGIST: CPT | Performed by: PATHOLOGY

## 2022-06-22 PROCEDURE — 85610 PROTHROMBIN TIME: CPT

## 2022-06-22 PROCEDURE — 88108 CYTOPATH CONCENTRATE TECH: CPT | Performed by: PATHOLOGY

## 2022-06-22 PROCEDURE — 1200000000 HC SEMI PRIVATE

## 2022-06-22 PROCEDURE — 85025 COMPLETE CBC W/AUTO DIFF WBC: CPT

## 2022-06-22 PROCEDURE — 2580000003 HC RX 258: Performed by: INTERNAL MEDICINE

## 2022-06-22 RX ORDER — MIDODRINE HYDROCHLORIDE 5 MG/1
5 TABLET ORAL
Status: DISCONTINUED | OUTPATIENT
Start: 2022-06-22 | End: 2022-06-26 | Stop reason: HOSPADM

## 2022-06-22 RX ORDER — ALBUMIN (HUMAN) 12.5 G/50ML
25 SOLUTION INTRAVENOUS EVERY 6 HOURS
Status: COMPLETED | OUTPATIENT
Start: 2022-06-22 | End: 2022-06-24

## 2022-06-22 RX ORDER — LACTULOSE 10 G/15ML
20 SOLUTION ORAL 3 TIMES DAILY
Status: DISCONTINUED | OUTPATIENT
Start: 2022-06-22 | End: 2022-06-26 | Stop reason: HOSPADM

## 2022-06-22 RX ORDER — LORAZEPAM 2 MG/ML
2 INJECTION INTRAMUSCULAR ONCE
Status: COMPLETED | OUTPATIENT
Start: 2022-06-22 | End: 2022-06-22

## 2022-06-22 RX ADMIN — LACTULOSE 20 G: 10 SOLUTION ORAL at 06:48

## 2022-06-22 RX ADMIN — LEVOTHYROXINE SODIUM 50 MCG: 0.05 TABLET ORAL at 06:48

## 2022-06-22 RX ADMIN — DULOXETINE HYDROCHLORIDE 60 MG: 30 CAPSULE, DELAYED RELEASE ORAL at 10:08

## 2022-06-22 RX ADMIN — LORAZEPAM 2 MG: 2 INJECTION INTRAMUSCULAR; INTRAVENOUS at 02:38

## 2022-06-22 RX ADMIN — HEPATITIS B VACCINE (RECOMBINANT) 20 MCG: 10 INJECTION, SUSPENSION INTRAMUSCULAR at 15:42

## 2022-06-22 RX ADMIN — PANTOPRAZOLE SODIUM 40 MG: 40 TABLET, DELAYED RELEASE ORAL at 10:08

## 2022-06-22 RX ADMIN — ALBUMIN (HUMAN) 25 G: 0.25 INJECTION, SOLUTION INTRAVENOUS at 17:15

## 2022-06-22 RX ADMIN — WATER: 100 IRRIGANT IRRIGATION at 17:36

## 2022-06-22 RX ADMIN — MIDODRINE HYDROCHLORIDE 5 MG: 5 TABLET ORAL at 15:40

## 2022-06-22 RX ADMIN — RIFAXIMIN 550 MG: 550 TABLET ORAL at 10:08

## 2022-06-22 RX ADMIN — LACTULOSE 20 G: 10 SOLUTION ORAL at 14:15

## 2022-06-22 RX ADMIN — SODIUM CHLORIDE, PRESERVATIVE FREE 10 ML: 5 INJECTION INTRAVENOUS at 22:11

## 2022-06-22 RX ADMIN — HEPATITIS A VACCINE 1440 UNITS: 1440 INJECTION, SUSPENSION INTRAMUSCULAR at 15:41

## 2022-06-22 RX ADMIN — LACTULOSE 20 G: 10 SOLUTION ORAL at 22:11

## 2022-06-22 RX ADMIN — SODIUM CHLORIDE, PRESERVATIVE FREE 10 ML: 5 INJECTION INTRAVENOUS at 10:43

## 2022-06-22 RX ADMIN — ALBUMIN (HUMAN) 25 G: 0.25 INJECTION, SOLUTION INTRAVENOUS at 12:59

## 2022-06-22 RX ADMIN — FUROSEMIDE 40 MG: 40 TABLET ORAL at 10:16

## 2022-06-22 RX ADMIN — RIFAXIMIN 550 MG: 550 TABLET ORAL at 22:11

## 2022-06-22 RX ADMIN — SPIRONOLACTONE 50 MG: 50 TABLET ORAL at 10:08

## 2022-06-22 ASSESSMENT — PAIN SCALES - WONG BAKER
WONGBAKER_NUMERICALRESPONSE: 0

## 2022-06-22 ASSESSMENT — PAIN SCALES - GENERAL
PAINLEVEL_OUTOF10: 0
PAINLEVEL_OUTOF10: 0

## 2022-06-22 NOTE — PROGRESS NOTES
Met with patient's wife at bedside today at 1:30 PM.  I updated her regarding patient's guarded condition and now needing BiPAP. Also discussed GI recommendations regarding liver transplant//TIPS/Pleurx catheter. She says that she knows that her  is not her candidate for transplant and TIPS based on previous encounters with other physicians. She would like us to continue current treatment, keep patient over here and will discuss with her daughter regarding goals of care/management options.   Patient remains full code at this time

## 2022-06-22 NOTE — PROGRESS NOTES
RN not able to stimulate patient enough to get him to respond verbally consistently when asked questions. Doctor at bedside to evaluate, RN used ammonia capsule and patient became more alert and answered some questions appropriately, followed commands and took pills.

## 2022-06-22 NOTE — CONSULTS
Department of Internal Medicine  Gastroenterology Consult Note  Karoline Davis. Zhane CHANDLER      Reason for Consult:  Cirrhosis, PSE, hydrothorax    Primary Care Physician:  Jeremy Coronado MD    History Obtained From:  patient, electronic medical record    CC: dyspnea and altered mentation     HISTORY OF PRESENT ILLNESS:              The patient is a 78 y. o.  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 The pleural effusion has been refractory to diuretics. TIPS has not been seriously entertained due to fear of aggravating his existing hepatic encephalopathy. His last AFP was 2 on 3/19/22 and last liver imaging (CT) 5/5/22 showed no liver mass. Doppler ultrasound 5/5t/22 showed patent portal and hepatic veins. He was admitted now with dyspnea and a large recurrent right pleural effusion. He has no past cardiac or pulmonary disease. The patient currently is lethargic and unable to provide a meaningful history. Attempts to reach his wife were unsuccessful this morning. Past Medical History:        Diagnosis Date    Anxiety     Cirrhosis, alcoholic ([address]     Diabetes mellitus ([address]     GERD (gastroesophageal reflux disease) 2000    GERD (gastroesophageal reflux disease)     Hyperlipidemia     Hypertension     Portal hypertension (HCC)     Pulmonary nodules     Sleep apnea     SOB (shortness of breath)     Thyroid disease        Past Surgical History:        Procedure Laterality Date    APPENDECTOMY      CHOLECYSTECTOMY      COLONOSCOPY      ENDOSCOPY, COLON, DIAGNOSTIC      FOOT SURGERY      needle removed,not sure which foot, per wife.     TONSILLECTOMY      UPPER GASTROINTESTINAL ENDOSCOPY N/A 1/19/2022    EGD BIOPSY performed by Deanna Peter MD at [address] [place] [address]         Medications Prior to Admission:    Prior to Admission medications    Medication Sig Start Date End Date Taking?  Authorizing Provider   spironolactone (ALDACTONE) 100 MG tablet Take 0.5 tablets by mouth daily 5/27/22 6/26/22  MARK Suazo MD   furosemide (LASIX) 40 MG tablet Take 1 tablet by mouth daily  Patient taking differently: Take 80 mg by mouth 2 times daily  5/27/22   MARK Suazo MD   rifAXIMin [facility]) 550 MG tablet Take 1 tablet by mouth 2 times daily 5/26/22   MARK Suazo MD   levothyroxine (SYNTHROID) 50 MCG tablet Take 1 tablet by mouth Daily 3/30/22 9/26/22  Marixa Das MD   lactulose [facility]) 10 GM/15ML solution Take 30 mLs by mouth 3 times daily Hold if having diarrhea 3/21/22   Sofiya Tidwell MD   DULoxetine (CYMBALTA) 60 MG extended release capsule TAKE 1 CAPSULE BY MOUTH ONCE DAILY 3/11/22 1/13/23  Marixa Das MD   nadolol (CORGARD) 40 MG tablet Take 1 tablet by mouth daily 11/18/21 6/8/22  Marixa Das MD   albuterol sulfate HFA (VENTOLIN HFA) 108 (90 Base) MCG/ACT inhaler Inhale 2 puffs into the lungs 4 times daily as needed for Wheezing 8/30/21   Marixa Das MD   tadalafil (CIALIS) 20 MG tablet Take 20 mg by mouth every 3 days   Patient not taking: Reported on 6/21/2022    Historical Provider, MD   vitamin E 400 UNIT capsule Take 800 Units by mouth daily  Patient not taking: Reported on 6/21/2022    Historical Provider, MD   magnesium oxide (MAG-OX) 400 MG tablet Take 200 mg by mouth 2 times daily OTC 1 qd 10/15/19   Historical Provider, MD   Multiple Vitamin (MULTI VITAMIN MENS PO) Take 1 tablet by mouth daily    Historical Provider, MD   ESOMEPRAZOLE MAGNESIUM PO Take 20 mg by mouth daily OTC    Historical Provider, MD   [place]-3 1000 MG CAPS Take 1 capsule by mouth daily Historical Provider, MD       Allergies: Allergies   Allergen Reactions    Lisinopril Swelling     Tongue swelling      Zetia [Ezetimibe] Swelling     Facial swelling    Atorvastatin     Codeine Other (See Comments)     Blood pressure drops    Hydrochlorothiazide     Hydroxyzine      Fatigue and depressed    Lexapro [Escitalopram Oxalate]      Increased depression    Pravastatin    . Social History:    TOBACCO:  No  ETOH:  Yes- prior to 2 months ago    Family History: reviewed and positives included in HPI- all other pertinent GI family history negative      REVIEW OF SYSTEMS: see HPI for positives and pertinent negatives. All other systems reviewed and are negative    PHYSICAL EXAM:    Vitals:  /67   Pulse 69   Temp 97.8 °F (36.6 °C) (Oral)   Resp 16   Ht 5' 8\" (1.727 m)   Wt 247 lb 8 oz (112.3 kg)   SpO2 100%   BMI 37.63 kg/m²   CONSTITUTIONAL: lethargic and confused- lying in bed uncovered and naked   EYES:  pupils equal, round and reactive to light and sclera clear  ENT:  normocepalic, without obvious abnormality  NECK:  supple, symmetrical, trachea midline  HEMATOLOGIC/LYMPHATICS:  no cervical lymphadenopathy and no supraclavicular lymphadenopathy  LUNGS:  clear to auscultation  CARDIOVASCULAR:  regular rate and rhythm and no murmur noted  ABDOMEN:  Soft, non-tender, mildly distended with normal bowel sounds.  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 Further treatment options include TIPS, liver transplant and possibly a tunneled catheter such as (Pleur-X) in patients that are not transplant or TIPS candidates. He is probably not a great transplant candidate as he has recent alcohol abuse and his MELD is only 6- although his candidacy for this probably should be best decided by a transplant center. 2) I am concerned about a TIPS markedly aggravating his already existing PSE  3) In summary, if his wife is agreeable, it may be best to transfer him to a transplant center for their opinion about the options of Pleur-X, TIPS, or transplant - as mentioned previously, attempts to reach her this morning were unsuccessful. Susan Rosas.  Leilani Arenas M.D

## 2022-06-22 NOTE — PROGRESS NOTES
PROCEDURE PERFORMED: PARACENTESIS    PRIMARY INDICATION FOR PROCEDURE: ASCITES    INFORMED CONSENT:  Obtained prior to procedure. Consent placed in chart. JOVITA SCORE PRE PROCEDURE: 9       PT TRANSPORTED FROM: 3023                                   TO THE IR ROOM: @ 0845                       ASSESSMENT:     BARRIER PRECAUTIONS & STERILE TECHNIQUE:               Pt transferred to the table and positioned for comfort. Warm blankets given. Pt placed on Cardiac Monitor. Pt and draped in a sterile fashion with chlorhexadine.   TIMEOUT: 0850  STAFF: Dr Rose Marie Sanchez RN, [facility] RN  PAIN/LOCAL ANESTHESIA/SEDATION MANAGEMENT:           Local: Lidocaine 1% given by Dr. Wheatley Currebeka          Sedation:              Fentanyl:             Versed:     INTRAOPERATIVE:           ACCESS TIME: 4412          US IMAGES:           ONE STEP          FINAL IMAGE TAKEN TO CONFIRM PLACEMENT OF: CATHETER          CONTRAST/CC:     STERILE DRESSINGS: APPLIED BY MADY RT    SPECIMENS: 1000 ml of chyle ascitic fluid removed and sent to the lab.  6800 Ml total fluid removed    EBL: >1cc         FOLLOW- UP X-RAY: NA    COMPLICATIONS:NA    JOVITA SCORE POST PROCEDURE:  9        REPORT CALLED TO: Wellington Escalante RN

## 2022-06-22 NOTE — PROGRESS NOTES
Idalmis Mejia MD    Hospitalist Progress Note      Name:  Hoa Buchanan /Age/Sex: 1945  (78 y.o. male)   MRN & CSN:  [id number] & [id number] Admission Date/Time: 2022  2:07 PM   Location:  [address] [place] [address] PCP: Tyra Kendrick MD       I saw and examined the patient on 2022 at 10:32 AM.    Hospital Day: 2  Barriers to discharge:   Assessment and Plan:     78 y.o. male with pmh of type 2 diabetes, esophageal varices, hyperlipidemia, JENNIFER, alcoholic cirrhosis pulmonary nodules, GERD, obesity, hypothyroidism, anxiety, CKD who presents to the ER for evaluation of increasing dyspnea. Off notem patient was recently hospitalized for hepatic encephalopathy needing intubation and stayed in hospital for 16 days in May 2022. Patient has had about a 25 lbs weight gain since his discharge       # Acute on chronic hypoxic/hypercapnic respiratory failure secondary to recurrent right-sided pleural effusion 2/2 cirrhosis  IR consulted by ER, patient went directly to have thoracentesis where they removed 2L of fluid  Echo on 22 with EF 50-55%  PCO2 of 73, higher than baseline with somnolence, started on BiPAP. Pulm consulted, Pt needed intubation last month with similar presentation. # Decompensated alcoholic cirrhosis with hyperammonia and hydrothorax. Low suspicion for SBP: no fevers, significant abdominal distension, or leukocytosis  S/p paracentesis with 6.8 L fluid removal in , Trend CMP   Continue home Lasix switched to IV as tolerated spironolactone, Xifaxan, lactulose  Hold anticoagulation until definitive plan for paracentesis, use SCDs  GI consult: Dr. Bartolo Cedeño- recommendations noted, discussed with patient's wife, she will talk to her daughters before making any decision regarding transfer to higher level of care    · Chronic transaminitis  · CKD. Baseline fluctuates between 1.0 and 1.6  · Detectable troponin. Trend x3, likely  secondary to fluid overload/CANDIE.   Repeat EKG today  · Anxiety. Continue cymbalta  · Hypothyroidism. Continue Synthroid  · Obesity BMI 39  · GERD  · History of pulmonary nodules  · Obstructive sleep apnea  · Hyperlipidemia  · History of esophageal varices  · Type 2 diabetes  · Fairly controlled, last hemoglobin A1C 6.1 in 2021     Guarded prognosis, discussed with patient spouse at bedside. DVT prop Lovenox  Full code, confirmed by patient's wife at bedside       Subjective:     Patient seen and examined at bedside. Drowsy but easily arousable. Denies any chest pain headache visual changes or extremity weakness with goes back to sleep during conversation    Objective: Intake/Output Summary (Last 24 hours) at 6/22/2022 1032  Last data filed at 6/21/2022 1704  Gross per 24 hour   Intake --   Output 2000 ml   Net -2000 ml      Vitals:   Vitals:    06/22/22 0953   BP: (!) 118/56   Pulse: 63   Resp: 13   Temp: 98 °F (36.7 °C)   SpO2: 98%       Ten point ROS reviewed negative, unless as noted above    Physical Exam:     GENERAL APPEARANCE: not in any acute distress,  appears comfortable. NECK: Supple, no JVD. CARDIOVASCULAR: Regular rate and rhythm, no murmurs, rubs or gallops  LUNGS: clear to auscultation bilaterally   ABDOMEN: normoactive bowel sounds, soft non-tender and mild ascites  EXTREMITIES: No edema  MUSCULOSKELETAL S: normal movement and normal bulk in all ext  NEUROLOGIC: A drowsy but easily arousable grossly non focal exam, moving all extremities   SKIN: No Rashes       Electronically signed by Eric De La Torre MD on 6/22/2022 at 10:32 AM    Minimum 35 Minutes spent in preparation of following this patient including face to face encounter, discussions with the patient and/or family, medication reconciliation, and transition of care preparation.             Medications:   Medications:    hepatitis A vaccine  1,440 Units IntraMUSCular Once    hepatitis b vaccine recombinant  1 mL IntraMUSCular Once    lactulose  20 g Oral TID    levothyroxine  50 mcg Oral Daily    spironolactone  50 mg Oral Daily    rifAXIMin  550 mg Oral BID    DULoxetine  60 mg Oral Daily    sodium chloride flush  10 mL IntraVENous 2 times per day    enoxaparin  40 mg SubCUTAneous Daily    furosemide  40 mg Oral BID    pantoprazole  40 mg Oral Daily      Infusions:    sodium chloride       PRN Meds: albuterol sulfate HFA, 2 puff, 4x Daily PRN  sodium chloride flush, 10 mL, PRN  sodium chloride, , PRN  polyethylene glycol, 17 g, Daily PRN  acetaminophen, 650 mg, Q6H PRN   Or  acetaminophen, 650 mg, Q6H PRN  ondansetron, 4 mg, Q8H PRN   Or  ondansetron, 4 mg, Q6H PRN

## 2022-06-23 LAB
ALBUMIN SERPL-MCNC: 3.7 GM/DL (ref 3.4–5)
ALP BLD-CCNC: 120 IU/L (ref 40–129)
ALT SERPL-CCNC: 32 U/L (ref 10–40)
AMMONIA: 40 UMOL/L (ref 16–60)
ANION GAP SERPL CALCULATED.3IONS-SCNC: 9 MMOL/L (ref 4–16)
APTT: 32.7 SECONDS (ref 25.1–37.1)
AST SERPL-CCNC: 43 IU/L (ref 15–37)
BASE EXCESS MIXED: 10.2 (ref 0–1.2)
BASE EXCESS MIXED: 10.5 (ref 0–1.2)
BASOPHILS ABSOLUTE: 0 K/CU MM
BASOPHILS RELATIVE PERCENT: 0.7 % (ref 0–1)
BILIRUB SERPL-MCNC: 1.1 MG/DL (ref 0–1)
BUN BLDV-MCNC: 23 MG/DL (ref 6–23)
CALCIUM SERPL-MCNC: 9.7 MG/DL (ref 8.3–10.6)
CARBON MONOXIDE, BLOOD: 2.1 % (ref 0–5)
CHLORIDE BLD-SCNC: 97 MMOL/L (ref 99–110)
CO2 CONTENT: 40 MMOL/L (ref 19–24)
CO2: 36 MMOL/L (ref 21–32)
COMMENT: ABNORMAL
COMMENT: ABNORMAL
CREAT SERPL-MCNC: 1.2 MG/DL (ref 0.9–1.3)
DIFFERENTIAL TYPE: ABNORMAL
EOSINOPHILS ABSOLUTE: 0.3 K/CU MM
EOSINOPHILS RELATIVE PERCENT: 4.3 % (ref 0–3)
GFR AFRICAN AMERICAN: >60 ML/MIN/1.73M2
GFR NON-AFRICAN AMERICAN: 59 ML/MIN/1.73M2
GLUCOSE BLD-MCNC: 113 MG/DL (ref 70–99)
HCO3 ARTERIAL: 38.1 MMOL/L (ref 18–23)
HCO3 VENOUS: 37.9 MMOL/L (ref 19–25)
HCT VFR BLD CALC: 39.4 % (ref 42–52)
HEMOGLOBIN: 12.6 GM/DL (ref 13.5–18)
IMMATURE NEUTROPHIL %: 0 % (ref 0–0.43)
INR BLD: 1.09 INDEX
LYMPHOCYTES ABSOLUTE: 0.8 K/CU MM
LYMPHOCYTES RELATIVE PERCENT: 13.9 % (ref 24–44)
MAGNESIUM: 1.6 MG/DL (ref 1.8–2.4)
MCH RBC QN AUTO: 32.6 PG (ref 27–31)
MCHC RBC AUTO-ENTMCNC: 32 % (ref 32–36)
MCV RBC AUTO: 102.1 FL (ref 78–100)
METHEMOGLOBIN ARTERIAL: 1.7 %
MONOCYTES ABSOLUTE: 0.6 K/CU MM
MONOCYTES RELATIVE PERCENT: 9.7 % (ref 0–4)
NUCLEATED RBC %: 0 %
O2 SAT, VEN: 96.5 % (ref 50–70)
O2 SATURATION: 93.3 % (ref 96–97)
PCO2 ARTERIAL: 63 MMHG (ref 32–45)
PCO2, VEN: 64 MMHG (ref 38–52)
PDW BLD-RTO: 14.8 % (ref 11.7–14.9)
PH BLOOD: 7.39 (ref 7.34–7.45)
PH VENOUS: 7.38 (ref 7.32–7.42)
PLATELET # BLD: 150 K/CU MM (ref 140–440)
PMV BLD AUTO: 10.5 FL (ref 7.5–11.1)
PO2 ARTERIAL: 73 MMHG (ref 75–100)
PO2, VEN: 129 MMHG (ref 28–48)
POTASSIUM SERPL-SCNC: 3.1 MMOL/L (ref 3.5–5.1)
PROTHROMBIN TIME: 14.1 SECONDS (ref 11.7–14.5)
RBC # BLD: 3.86 M/CU MM (ref 4.6–6.2)
SEGMENTED NEUTROPHILS ABSOLUTE COUNT: 4.1 K/CU MM
SEGMENTED NEUTROPHILS RELATIVE PERCENT: 71.4 % (ref 36–66)
SODIUM BLD-SCNC: 142 MMOL/L (ref 135–145)
TOTAL IMMATURE NEUTOROPHIL: 0 K/CU MM
TOTAL NUCLEATED RBC: 0 K/CU MM
TOTAL PROTEIN: 7.3 GM/DL (ref 6.4–8.2)
WBC # BLD: 5.8 K/CU MM (ref 4–10.5)

## 2022-06-23 PROCEDURE — 1200000000 HC SEMI PRIVATE

## 2022-06-23 PROCEDURE — 2700000000 HC OXYGEN THERAPY PER DAY

## 2022-06-23 PROCEDURE — 6360000002 HC RX W HCPCS: Performed by: INTERNAL MEDICINE

## 2022-06-23 PROCEDURE — 6370000000 HC RX 637 (ALT 250 FOR IP): Performed by: INTERNAL MEDICINE

## 2022-06-23 PROCEDURE — 85730 THROMBOPLASTIN TIME PARTIAL: CPT

## 2022-06-23 PROCEDURE — 6360000002 HC RX W HCPCS: Performed by: PHYSICIAN ASSISTANT

## 2022-06-23 PROCEDURE — 94640 AIRWAY INHALATION TREATMENT: CPT

## 2022-06-23 PROCEDURE — 2580000003 HC RX 258: Performed by: PHYSICIAN ASSISTANT

## 2022-06-23 PROCEDURE — 82805 BLOOD GASES W/O2 SATURATION: CPT

## 2022-06-23 PROCEDURE — 36600 WITHDRAWAL OF ARTERIAL BLOOD: CPT

## 2022-06-23 PROCEDURE — 85025 COMPLETE CBC W/AUTO DIFF WBC: CPT

## 2022-06-23 PROCEDURE — 82803 BLOOD GASES ANY COMBINATION: CPT

## 2022-06-23 PROCEDURE — 80053 COMPREHEN METABOLIC PANEL: CPT

## 2022-06-23 PROCEDURE — 94660 CPAP INITIATION&MGMT: CPT

## 2022-06-23 PROCEDURE — 85610 PROTHROMBIN TIME: CPT

## 2022-06-23 PROCEDURE — 6370000000 HC RX 637 (ALT 250 FOR IP): Performed by: PHYSICIAN ASSISTANT

## 2022-06-23 PROCEDURE — 94761 N-INVAS EAR/PLS OXIMETRY MLT: CPT

## 2022-06-23 PROCEDURE — 36415 COLL VENOUS BLD VENIPUNCTURE: CPT

## 2022-06-23 PROCEDURE — 82140 ASSAY OF AMMONIA: CPT

## 2022-06-23 PROCEDURE — P9047 ALBUMIN (HUMAN), 25%, 50ML: HCPCS | Performed by: INTERNAL MEDICINE

## 2022-06-23 PROCEDURE — 83735 ASSAY OF MAGNESIUM: CPT

## 2022-06-23 RX ORDER — IPRATROPIUM BROMIDE AND ALBUTEROL SULFATE 2.5; .5 MG/3ML; MG/3ML
1 SOLUTION RESPIRATORY (INHALATION) EVERY 4 HOURS PRN
Status: DISCONTINUED | OUTPATIENT
Start: 2022-06-23 | End: 2022-06-26 | Stop reason: HOSPADM

## 2022-06-23 RX ORDER — POTASSIUM CHLORIDE 7.45 MG/ML
10 INJECTION INTRAVENOUS
Status: DISCONTINUED | OUTPATIENT
Start: 2022-06-23 | End: 2022-06-23

## 2022-06-23 RX ORDER — POTASSIUM CHLORIDE 20 MEQ/1
40 TABLET, EXTENDED RELEASE ORAL PRN
Status: DISCONTINUED | OUTPATIENT
Start: 2022-06-23 | End: 2022-06-26 | Stop reason: HOSPADM

## 2022-06-23 RX ORDER — POTASSIUM CHLORIDE 7.45 MG/ML
10 INJECTION INTRAVENOUS PRN
Status: DISCONTINUED | OUTPATIENT
Start: 2022-06-23 | End: 2022-06-26 | Stop reason: HOSPADM

## 2022-06-23 RX ORDER — IPRATROPIUM BROMIDE AND ALBUTEROL SULFATE 2.5; .5 MG/3ML; MG/3ML
1 SOLUTION RESPIRATORY (INHALATION)
Status: DISCONTINUED | OUTPATIENT
Start: 2022-06-23 | End: 2022-06-23

## 2022-06-23 RX ORDER — MAGNESIUM SULFATE IN WATER 40 MG/ML
2000 INJECTION, SOLUTION INTRAVENOUS ONCE
Status: COMPLETED | OUTPATIENT
Start: 2022-06-23 | End: 2022-06-23

## 2022-06-23 RX ORDER — ALBUTEROL SULFATE 90 UG/1
2 AEROSOL, METERED RESPIRATORY (INHALATION) 2 TIMES DAILY
Status: DISCONTINUED | OUTPATIENT
Start: 2022-06-23 | End: 2022-06-26 | Stop reason: HOSPADM

## 2022-06-23 RX ADMIN — LACTULOSE 20 G: 10 SOLUTION ORAL at 22:25

## 2022-06-23 RX ADMIN — MIDODRINE HYDROCHLORIDE 5 MG: 5 TABLET ORAL at 12:15

## 2022-06-23 RX ADMIN — LACTULOSE 20 G: 10 SOLUTION ORAL at 15:18

## 2022-06-23 RX ADMIN — MIDODRINE HYDROCHLORIDE 5 MG: 5 TABLET ORAL at 08:21

## 2022-06-23 RX ADMIN — RIFAXIMIN 550 MG: 550 TABLET ORAL at 22:25

## 2022-06-23 RX ADMIN — SODIUM CHLORIDE, PRESERVATIVE FREE 10 ML: 5 INJECTION INTRAVENOUS at 22:25

## 2022-06-23 RX ADMIN — MAGNESIUM SULFATE HEPTAHYDRATE 2000 MG: 2 INJECTION, SOLUTION INTRAVENOUS at 12:20

## 2022-06-23 RX ADMIN — LACTULOSE 20 G: 10 SOLUTION ORAL at 08:21

## 2022-06-23 RX ADMIN — ALBUTEROL SULFATE 2 PUFF: 90 AEROSOL, METERED RESPIRATORY (INHALATION) at 21:35

## 2022-06-23 RX ADMIN — ALBUMIN (HUMAN) 25 G: 0.25 INJECTION, SOLUTION INTRAVENOUS at 13:15

## 2022-06-23 RX ADMIN — MIDODRINE HYDROCHLORIDE 5 MG: 5 TABLET ORAL at 17:20

## 2022-06-23 RX ADMIN — ALBUTEROL SULFATE 2 PUFF: 90 AEROSOL, METERED RESPIRATORY (INHALATION) at 12:05

## 2022-06-23 RX ADMIN — DULOXETINE HYDROCHLORIDE 60 MG: 30 CAPSULE, DELAYED RELEASE ORAL at 08:21

## 2022-06-23 RX ADMIN — ALBUMIN (HUMAN) 25 G: 0.25 INJECTION, SOLUTION INTRAVENOUS at 02:21

## 2022-06-23 RX ADMIN — ALBUMIN (HUMAN) 25 G: 0.25 INJECTION, SOLUTION INTRAVENOUS at 22:25

## 2022-06-23 RX ADMIN — SPIRONOLACTONE 50 MG: 50 TABLET ORAL at 08:22

## 2022-06-23 RX ADMIN — ENOXAPARIN SODIUM 40 MG: 100 INJECTION SUBCUTANEOUS at 08:21

## 2022-06-23 RX ADMIN — ALBUMIN (HUMAN) 25 G: 0.25 INJECTION, SOLUTION INTRAVENOUS at 08:18

## 2022-06-23 RX ADMIN — ONDANSETRON 4 MG: 4 TABLET, ORALLY DISINTEGRATING ORAL at 22:25

## 2022-06-23 RX ADMIN — PANTOPRAZOLE SODIUM 40 MG: 40 TABLET, DELAYED RELEASE ORAL at 08:21

## 2022-06-23 RX ADMIN — POTASSIUM CHLORIDE 40 MEQ: 20 TABLET, EXTENDED RELEASE ORAL at 12:15

## 2022-06-23 RX ADMIN — LEVOTHYROXINE SODIUM 50 MCG: 0.05 TABLET ORAL at 06:40

## 2022-06-23 RX ADMIN — SODIUM CHLORIDE, PRESERVATIVE FREE 10 ML: 5 INJECTION INTRAVENOUS at 08:20

## 2022-06-23 RX ADMIN — RIFAXIMIN 550 MG: 550 TABLET ORAL at 08:21

## 2022-06-23 ASSESSMENT — PAIN SCALES - WONG BAKER
WONGBAKER_NUMERICALRESPONSE: 0

## 2022-06-23 NOTE — PROGRESS NOTES
Darren Raphael MD    Hospitalist Progress Note      Name:  Karlos Hsieh /Age/Sex: 1945  (78 y.o. male)   MRN & CSN:  [id number] & [id number] Admission Date/Time: 2022  2:07 PM   Location:  [facility] PCP: Joe Sanon MD       I saw and examined the patient on 2022 at 10:10 AM.    Hospital Day: 3  Barriers to discharge: Hypotension, hypercapnic respiratory failure needing BiPAP, hyperammonemia, goals of care  Assessment and Plan:     78 y.o. male with pmh of type 2 diabetes, esophageal varices, hyperlipidemia, JENNIFER, alcoholic cirrhosis pulmonary nodules, GERD, obesity, hypothyroidism, anxiety, CKD who presents to the ER for evaluation of increasing dyspnea. Off notem patient was recently hospitalized for hepatic encephalopathy needing intubation and stayed in hospital for 16 days in May 2022. Patient has had about a 25 lbs weight gain since his discharge        # Acute on chronic hypoxic/hypercapnic respiratory failure secondary to recurrent right-sided pleural effusion 2/2 cirrhosis  IR consulted by ER, patient went directly to have thoracentesis where they removed 2L of fluid  Echo on 22 with EF 50-55%  PCO2 of 73, higher than baseline with somnolence, started on BiPAP. Pulm consulted, more awake and alert. On 3 L nasal cannula, follow-up repeat ABG  Noted pulmonology plan for repeat thoracentesis     # Decompensated alcoholic cirrhosis with hyperammonia and hydrothorax. Low suspicion for SBP: no fevers, significant abdominal distension, or leukocytosis  S/p paracentesis with 6.8 L fluid removal in , Trend CMP   Continue home Lasix switched to IV as tolerated spironolactone, Xifaxan, lactulose  Hold anticoagulation until definitive plan for paracentesis, use SCDs  GI consult: Dr. Mix Mediate- recommendations noted, discussed with patient's wife, patient/wife still undecided about [facility] transfer and would like to talk to their children before making any decision.   Appreciate pulmonology and gastroenterology input    # Hypotension. Continue midodrine/albumin. Will hold Lasix for now due to large volume paracentesis and thoracentesis. # Hypokalemia. Replaced. Patient seen and examined at bedside. Laying comfortably in bed not in acute distress. No acute events reported overnight. Patient denies any chest pain, fever, headache, shortness of breath, abdominal pain, nausea or vomiting, diarrhea or new extremity weakness         · Chronic transaminitis  · CKD. Baseline fluctuates between 1.0 and 1.6  · Detectable troponin. Trend x3, likely  secondary to fluid overload/CANDIE. Repeat EKG today  · Anxiety. Continue cymbalta  · Hypothyroidism. Continue Synthroid  · Obesity BMI 39  · GERD  · History of pulmonary nodules  · Obstructive sleep apnea  · Hyperlipidemia  · History of esophageal varices  · Type 2 diabetes  · Fairly controlled, last hemoglobin A1C 6.1 in 2021     Guarded prognosis, discussed with patient spouse at bedside. DVT prop Lovenox  Full code, confirmed by patient's wife at bedside       Subjective:       Patient seen and examined at bedside. More awake and alert able to talk in words. Currently on BiPAP  Following simple commands    Objective: Intake/Output Summary (Last 24 hours) at 6/23/2022 1010  Last data filed at 6/23/2022 0618  Gross per 24 hour   Intake 10 ml   Output 1170 ml   Net -1160 ml      Vitals:   Vitals:    06/23/22 0904   BP:    Pulse:    Resp:    Temp:    SpO2: 100%       Ten point ROS reviewed negative, unless as noted above    Physical Exam:     GENERAL APPEARANCE: not in any acute distress,  appears comfortable. NECK: Supple, no JVD.   CARDIOVASCULAR: Regular rate and rhythm, no murmurs, rubs or gallops  LUNGS: Bilateral decreased breath sounds  ABDOMEN: normoactive bowel sounds, soft non-tender and mildly distended due to ascites  EXTREMITIES: No edema  MUSCULOSKELETAL S: normal movement and normal bulk in all ext  NEUROLOGIC: Alert and oriented x 3- grossly non focal exam, moving all extremities   SKIN: No Rashes       Electronically signed by Liliane Cueto MD on 6/23/2022 at 10:10 AM            Medications:   Medications:    lactulose  20 g Oral TID    albumin human  25 g IntraVENous Q6H    midodrine  5 mg Oral TID WC    levothyroxine  50 mcg Oral Daily    spironolactone  50 mg Oral Daily    rifAXIMin  550 mg Oral BID    DULoxetine  60 mg Oral Daily    sodium chloride flush  10 mL IntraVENous 2 times per day    enoxaparin  40 mg SubCUTAneous Daily    [Held by provider] furosemide  40 mg Oral BID    pantoprazole  40 mg Oral Daily      Infusions:    sodium chloride       PRN Meds: albuterol sulfate HFA, 2 puff, 4x Daily PRN  sodium chloride flush, 10 mL, PRN  sodium chloride, , PRN  polyethylene glycol, 17 g, Daily PRN  acetaminophen, 650 mg, Q6H PRN   Or  acetaminophen, 650 mg, Q6H PRN  ondansetron, 4 mg, Q8H PRN   Or  ondansetron, 4 mg, Q6H PRN

## 2022-06-23 NOTE — CONSULTS
[address] [place] [address], [address] [facility]                                  CONSULTATION    PATIENT NAME: Doug Farris                   :        1945  MED REC NO:   [id number]                          ROOM:       2980  ACCOUNT NO:   [de-identified]                           ADMIT DATE: 2022  PROVIDER:     Agatha Bennett MD    CONSULT DATE:  2022    HISTORY OF PRESENT ILLNESS:  The patient is a 79-year-old gentleman with  history of diabetes, hyperlipidemia, obstructive sleep apnea, esophageal  varies, history of recurrent pleural effusion and ascites, who was  admitted through the emergency room with complaints of increasing  shortness of breath and confusion. The patient had gained about 25  pounds since he was discharged. In the emergency room, he was found to  have recurrent right effusion and increased ammonia level. He was  having some cough. There was no history of hemoptysis, hematemesis,  nausea or vomiting. No history of chest pains. He was subsequently  admitted to the hospital.  The patient underwent right thoracentesis and  treatment for increased ammonia and has improved. He still has  significant pleural effusion left on the right side. PAST MEDICAL HISTORY:  Significant for alcoholic cirrhosis, diabetes,  gastroesophageal reflux disease, hyperlipidemia, portal hypertension,  history of pulmonary nodules and obstructive sleep apnea. PAST SURGICAL HISTORY:  Remarkable for appendectomy, cholecystectomy,  colonoscopy, tonsillectomy, upper endoscopy, vasectomy. FAMILY HISTORY:  Noncontributory. SOCIAL HISTORY:  Reveals that he quit smoking in , but used to smoke  a pack per day for 14 years prior to that. He has history of alcohol  abuse in the past.  No history of drug abuse. MEDICATIONS:  Reviewed.     ALLERGIES:  He is allergic to LISINOPRIL, ZETIA, ATORVASTATIN, CODEINE,  HYDROCHLOROTHIAZIDE, HYDROXYZINE, LEXAPRO, and PRAVASTATIN. REVIEW OF SYSTEMS:  A 10 to 14-point review of systems were reviewed and  are negative except for what is mentioned in history of present illness. PHYSICAL EXAMINATION:  GENERAL:  The patient is alert and oriented x3, in no acute respiratory  distress. VITAL SIGNS:  His blood pressure is 112/52 mmHg, pulse of 72 per minute  and respiratory rate of 17 per minute. He is afebrile. His saturation  is 100% on 4 liters of nasal cannula. HEENT:  Exam reveals mild JVD. No lymphadenopathy. NECK:  The neck is supple. LUNGS:  Exam revealed absent breath sounds on the right side. HEART:  Exam showed normal S1, S2. There is no S3 or S4 noted. ABDOMEN:  Exam shows that the abdomen is mildly distended. Bowel sounds  are present. NEUROLOGIC:  Exam reveals that he is awake and responsive, answering  questions appropriately and moving his extremities. LABORATORY DATA:  His ABGs done yesterday showed a pH of 7.30, pCO2 of  73, pO2 of 72 with 92% saturation on 3.5 liters nasal cannula. His CBC  showed a white count of 6.0, hemoglobin 12.8, hematocrit 41.0. His  electrolytes showed a sodium 142, potassium 3.9, chloride 99, carbon  dioxide 36, BUN 30, creatinine 1.4. His electrolytes this morning  showed a sodium 142, potassium 3.1, chloride 97, carbon dioxide 36, BUN  23, creatinine 1.2, and magnesium is 1.6. IMPRESSION:  1. Recurrent right effusion secondary to hepatic hydrothorax. 2.  Possible underlying COPD. 3.  Alcoholic cirrhosis. PLAN:  1. We will repeat right-sided thoracentesis. 2.  Consider TIPS procedure. 3.  Possible transfer to [address] [place] [address]. catheter can be placed as last resort, but should be avoided  if possible because it will be draining out his albumin and the fluid  will keep reaccumulating, and should be used as a last resort. Discussed with the patient and the wife.         Chelsea Hess MD    D: 06/23/2022 11:02:25       T: 06/23/2022 11:06:46     [email or web address]  Job#: [id number]     Doc#: [id number]    CC:

## 2022-06-23 NOTE — PROGRESS NOTES
06/23/22 1152   NIV Type   $NIV $Daily Charge   NIV Started/Stopped On   Equipment Type v60   Mode Bilevel   Mask Type Full face mask   Mask Size Medium   Settings/Measurements   IPAP 15 cmH20   CPAP/EPAP 5 cmH2O   Rate Ordered 12   Resp 14   Insp Rise Time (%) 3 %   FiO2  35 %   I Time/ I Time % 1.2 s   Vt Exhaled 584 mL   Minute Volume 7.6 Liters   Mask Leak (lpm) 57 lpm   Comfort Level Good   Using Accessory Muscles No   SpO2 100   Breath Sounds   Right Upper Lobe Diminished   Right Middle Lobe Diminished   Right Lower Lobe Diminished   Left Upper Lobe Diminished   Left Lower Lobe Diminished   Alarm Settings   Alarms On Y   Low Pressure 5 cmH2O   High Pressure  20 cmH2O   Delay Alarm 20 sec(s)   Apnea (secs) 20 secs   RR Low  12   RR High 50 br/min

## 2022-06-23 NOTE — PROGRESS NOTES
-More awake and talking  -abd exam unchanged  -Had extensive discussion with patient and wife at the bedside--I told her in my opinion we need to provide some definitive Rx for the hepatic hydrothorax and that we have 2 choices:   1) send to [facility] for another opinion about liver transplant and TIPS ( I am doubtful about either)  or   2) consider Pleur-X catheter placement here (assuming pulmonary agrees)  - she will think about these options and let us know

## 2022-06-23 NOTE — RT PROTOCOL NOTE
RT Inhaler-Nebulizer Bronchodilator Protocol Note    There is a bronchodilator order in the chart from a provider indicating to follow the RT Bronchodilator Protocol and there is an Initiate RT Inhaler-Nebulizer Bronchodilator Protocol order as well (see protocol at bottom of note). CXR Findings:  XR CHEST PORTABLE    Result Date: 6/21/2022  Decreased right pleural effusion However, there is persistent near total opacification of the right hemithorax     XR CHEST PORTABLE    Result Date: 6/21/2022  Probable large right pleural effusion with shift of the mediastinal structures to the left side and associated volume loss in the right lung       The findings from the last RT Protocol Assessment were as follows:   History Pulmonary Disease: None or smoker <15 pack years  Respiratory Pattern: Mild dyspnea at rest, irregular pattern, or RR 21-25 bpm  Breath Sounds: Clear breath sounds  Cough: Strong, spontaneous, non-productive  Indication for Bronchodilator Therapy: On home bronchodilators  Bronchodilator Assessment Score: 4    Aerosolized bronchodilator medication orders have been revised according to the RT Inhaler-Nebulizer Bronchodilator Protocol below. Respiratory Therapist to perform RT Therapy Protocol Assessment initially then follow the protocol. Repeat RT Therapy Protocol Assessment PRN for score 0-3 or on second treatment, BID, and PRN for scores above 3. No Indications - adjust the frequency to every 6 hours PRN wheezing or bronchospasm, if no treatments needed after 48 hours then discontinue using Per Protocol order mode. If indication present, adjust the RT bronchodilator orders based on the Bronchodilator Assessment Score as indicated below.   Use Inhaler orders unless patient has one or more of the following: on home nebulizer, not able to hold breath for 10 seconds, is not alert and oriented, cannot activate and use MDI correctly, or respiratory rate 25 breaths per minute or more, then use the equivalent nebulizer order(s) with same Frequency and PRN reasons based on the score. If a patient is on this medication at home then do not decrease Frequency below that used at home. 0-3 - enter or revise RT bronchodilator order(s) to equivalent RT Bronchodilator order with Frequency of every 4 hours PRN for wheezing or increased work of breathing using Per Protocol order mode. 4-6 - enter or revise RT Bronchodilator order(s) to two equivalent RT bronchodilator orders with one order with BID Frequency and one order with Frequency of every 4 hours PRN wheezing or increased work of breathing using Per Protocol order mode. 7-10 - enter or revise RT Bronchodilator order(s) to two equivalent RT bronchodilator orders with one order with TID Frequency and one order with Frequency of every 4 hours PRN wheezing or increased work of breathing using Per Protocol order mode. 11-13 - enter or revise RT Bronchodilator order(s) to one equivalent RT bronchodilator order with QID Frequency and an Albuterol order with Frequency of every 4 hours PRN wheezing or increased work of breathing using Per Protocol order mode. Greater than 13 - enter or revise RT Bronchodilator order(s) to one equivalent RT bronchodilator order with every 4 hours Frequency and an Albuterol order with Frequency of every 2 hours PRN wheezing or increased work of breathing using Per Protocol order mode. RT to enter RT Home Evaluation for COPD & MDI Assessment order using Per Protocol order mode.     Electronically signed by Wolf Lopez RCP on 6/23/2022 at 11:03 AM

## 2022-06-23 NOTE — PLAN OF CARE
Problem: Discharge Planning  Goal: Discharge to home or other facility with appropriate resources  Outcome: Progressing  Flowsheets (Taken 6/22/2022 2031)  Discharge to home or other facility with appropriate resources:   Identify barriers to discharge with patient and caregiver   Arrange for needed discharge resources and transportation as appropriate   Identify discharge learning needs (meds, wound care, etc)   Arrange for interpreters to assist at discharge as needed   Refer to discharge planning if patient needs post-hospital services based on physician order or complex needs related to functional status, cognitive ability or social support system     Problem: Skin/Tissue Integrity  Goal: Absence of new skin breakdown  Description: 1. Monitor for areas of redness and/or skin breakdown  2. Assess vascular access sites hourly  3. Every 4-6 hours minimum:  Change oxygen saturation probe site  4. Every 4-6 hours:  If on nasal continuous positive airway pressure, respiratory therapy assess nares and determine need for appliance change or resting period.   Outcome: Progressing     Problem: Safety - Adult  Goal: Free from fall injury  Outcome: Progressing

## 2022-06-24 ENCOUNTER — APPOINTMENT (OUTPATIENT)
Dept: GENERAL RADIOLOGY | Age: 77
DRG: 432 | End: 2022-06-24
Payer: MEDICARE

## 2022-06-24 ENCOUNTER — APPOINTMENT (OUTPATIENT)
Dept: INTERVENTIONAL RADIOLOGY/VASCULAR | Age: 77
DRG: 432 | End: 2022-06-24
Payer: MEDICARE

## 2022-06-24 LAB
ALBUMIN SERPL-MCNC: 3.7 GM/DL (ref 3.4–5)
ALP BLD-CCNC: 135 IU/L (ref 40–129)
ALT SERPL-CCNC: 41 U/L (ref 10–40)
AMYLASE FLUID: 38 U/L
ANION GAP SERPL CALCULATED.3IONS-SCNC: 6 MMOL/L (ref 4–16)
APTT: 31.9 SECONDS (ref 25.1–37.1)
AST SERPL-CCNC: 72 IU/L (ref 15–37)
BASOPHILS ABSOLUTE: 0.1 K/CU MM
BASOPHILS RELATIVE PERCENT: 0.7 % (ref 0–1)
BILIRUB SERPL-MCNC: 0.7 MG/DL (ref 0–1)
BUN BLDV-MCNC: 15 MG/DL (ref 6–23)
CALCIUM SERPL-MCNC: 10.2 MG/DL (ref 8.3–10.6)
CHLORIDE BLD-SCNC: 97 MMOL/L (ref 99–110)
CO2: 36 MMOL/L (ref 21–32)
CREAT SERPL-MCNC: 1 MG/DL (ref 0.9–1.3)
CULTURE: ABNORMAL
DIFFERENTIAL TYPE: ABNORMAL
EOSINOPHILS ABSOLUTE: 0.4 K/CU MM
EOSINOPHILS RELATIVE PERCENT: 5.6 % (ref 0–3)
FLUID TYPE: NORMAL INDEX
GFR AFRICAN AMERICAN: >60 ML/MIN/1.73M2
GFR NON-AFRICAN AMERICAN: >60 ML/MIN/1.73M2
GLUCOSE BLD-MCNC: 127 MG/DL (ref 70–99)
GLUCOSE, FLUID: 135 MG/DL
GRAM SMEAR: ABNORMAL
HCT VFR BLD CALC: 39.4 % (ref 42–52)
HEMOGLOBIN: 12.5 GM/DL (ref 13.5–18)
IMMATURE NEUTROPHIL %: 0.3 % (ref 0–0.43)
INR BLD: 1.13 INDEX
LACTATE DEHYDROGENASE, FLUID: 53 IU/L
LYMPHOCYTES ABSOLUTE: 0.7 K/CU MM
LYMPHOCYTES RELATIVE PERCENT: 9.6 % (ref 24–44)
LYMPHOCYTES, BODY FLUID: 56 %
Lab: ABNORMAL
MAGNESIUM: 1.8 MG/DL (ref 1.8–2.4)
MCH RBC QN AUTO: 32.7 PG (ref 27–31)
MCHC RBC AUTO-ENTMCNC: 31.7 % (ref 32–36)
MCV RBC AUTO: 103.1 FL (ref 78–100)
MESOTHELIAL FLUID: 6 /100 WBC
MONOCYTE, FLUID: 31 %
MONOCYTES ABSOLUTE: 0.8 K/CU MM
MONOCYTES RELATIVE PERCENT: 10.5 % (ref 0–4)
NEUTROPHIL, FLUID: 12 %
NUCLEATED RBC %: 0 %
OTHER CELLS FLUID: NORMAL
PDW BLD-RTO: 14.6 % (ref 11.7–14.9)
PHOSPHORUS: 2.1 MG/DL (ref 2.5–4.9)
PLATELET # BLD: 141 K/CU MM (ref 140–440)
PMV BLD AUTO: 10.6 FL (ref 7.5–11.1)
POTASSIUM SERPL-SCNC: 4 MMOL/L (ref 3.5–5.1)
PRO-BNP: 2741 PG/ML
PROTEIN FLUID: 2.2 GM/DL
PROTHROMBIN TIME: 14.6 SECONDS (ref 11.7–14.5)
RBC # BLD: 3.82 M/CU MM (ref 4.6–6.2)
RBC FLUID: 5000 /CU MM
SEGMENTED NEUTROPHILS ABSOLUTE COUNT: 5.3 K/CU MM
SEGMENTED NEUTROPHILS RELATIVE PERCENT: 73.3 % (ref 36–66)
SODIUM BLD-SCNC: 139 MMOL/L (ref 135–145)
SPECIMEN: ABNORMAL
TOTAL IMMATURE NEUTOROPHIL: 0.02 K/CU MM
TOTAL NUCLEATED RBC: 0 K/CU MM
TOTAL PROTEIN: 7 GM/DL (ref 6.4–8.2)
WBC # BLD: 7.3 K/CU MM (ref 4–10.5)
WBC FLUID: 211 /CU MM

## 2022-06-24 PROCEDURE — 80053 COMPREHEN METABOLIC PANEL: CPT

## 2022-06-24 PROCEDURE — 83880 ASSAY OF NATRIURETIC PEPTIDE: CPT

## 2022-06-24 PROCEDURE — 6370000000 HC RX 637 (ALT 250 FOR IP): Performed by: INTERNAL MEDICINE

## 2022-06-24 PROCEDURE — 2709999900 HC NON-CHARGEABLE SUPPLY

## 2022-06-24 PROCEDURE — 82150 ASSAY OF AMYLASE: CPT

## 2022-06-24 PROCEDURE — 2580000003 HC RX 258: Performed by: PHYSICIAN ASSISTANT

## 2022-06-24 PROCEDURE — 85025 COMPLETE CBC W/AUTO DIFF WBC: CPT

## 2022-06-24 PROCEDURE — 71045 X-RAY EXAM CHEST 1 VIEW: CPT

## 2022-06-24 PROCEDURE — 85730 THROMBOPLASTIN TIME PARTIAL: CPT

## 2022-06-24 PROCEDURE — 0W993ZZ DRAINAGE OF RIGHT PLEURAL CAVITY, PERCUTANEOUS APPROACH: ICD-10-PCS | Performed by: RADIOLOGY

## 2022-06-24 PROCEDURE — 82945 GLUCOSE OTHER FLUID: CPT

## 2022-06-24 PROCEDURE — 36415 COLL VENOUS BLD VENIPUNCTURE: CPT

## 2022-06-24 PROCEDURE — 83615 LACTATE (LD) (LDH) ENZYME: CPT

## 2022-06-24 PROCEDURE — 85610 PROTHROMBIN TIME: CPT

## 2022-06-24 PROCEDURE — 6370000000 HC RX 637 (ALT 250 FOR IP): Performed by: PHYSICIAN ASSISTANT

## 2022-06-24 PROCEDURE — 89051 BODY FLUID CELL COUNT: CPT

## 2022-06-24 PROCEDURE — 84157 ASSAY OF PROTEIN OTHER: CPT

## 2022-06-24 PROCEDURE — 32555 ASPIRATE PLEURA W/ IMAGING: CPT

## 2022-06-24 PROCEDURE — C1729 CATH, DRAINAGE: HCPCS

## 2022-06-24 PROCEDURE — 88305 TISSUE EXAM BY PATHOLOGIST: CPT

## 2022-06-24 PROCEDURE — 84145 PROCALCITONIN (PCT): CPT

## 2022-06-24 PROCEDURE — 1200000000 HC SEMI PRIVATE

## 2022-06-24 PROCEDURE — 94640 AIRWAY INHALATION TREATMENT: CPT

## 2022-06-24 PROCEDURE — 88108 CYTOPATH CONCENTRATE TECH: CPT

## 2022-06-24 PROCEDURE — 84100 ASSAY OF PHOSPHORUS: CPT

## 2022-06-24 PROCEDURE — 83735 ASSAY OF MAGNESIUM: CPT

## 2022-06-24 RX ADMIN — ALBUTEROL SULFATE 2 PUFF: 90 AEROSOL, METERED RESPIRATORY (INHALATION) at 20:18

## 2022-06-24 RX ADMIN — LACTULOSE 20 G: 10 SOLUTION ORAL at 11:17

## 2022-06-24 RX ADMIN — SPIRONOLACTONE 50 MG: 50 TABLET ORAL at 11:17

## 2022-06-24 RX ADMIN — SODIUM CHLORIDE, PRESERVATIVE FREE 10 ML: 5 INJECTION INTRAVENOUS at 20:45

## 2022-06-24 RX ADMIN — LEVOTHYROXINE SODIUM 50 MCG: 0.05 TABLET ORAL at 11:17

## 2022-06-24 RX ADMIN — DULOXETINE HYDROCHLORIDE 60 MG: 30 CAPSULE, DELAYED RELEASE ORAL at 11:16

## 2022-06-24 RX ADMIN — RIFAXIMIN 550 MG: 550 TABLET ORAL at 20:44

## 2022-06-24 RX ADMIN — SODIUM CHLORIDE, PRESERVATIVE FREE 10 ML: 5 INJECTION INTRAVENOUS at 11:18

## 2022-06-24 RX ADMIN — RIFAXIMIN 550 MG: 550 TABLET ORAL at 11:16

## 2022-06-24 RX ADMIN — LACTULOSE 20 G: 10 SOLUTION ORAL at 15:33

## 2022-06-24 RX ADMIN — PANTOPRAZOLE SODIUM 40 MG: 40 TABLET, DELAYED RELEASE ORAL at 11:17

## 2022-06-24 RX ADMIN — LACTULOSE 20 G: 10 SOLUTION ORAL at 20:44

## 2022-06-24 ASSESSMENT — PAIN SCALES - WONG BAKER
WONGBAKER_NUMERICALRESPONSE: 0
WONGBAKER_NUMERICALRESPONSE: 0

## 2022-06-24 ASSESSMENT — ENCOUNTER SYMPTOMS
DIARRHEA: 0
WHEEZING: 0
SHORTNESS OF BREATH: 0
COUGH: 0
VOMITING: 0
NAUSEA: 0
ABDOMINAL PAIN: 0
CONSTIPATION: 0

## 2022-06-24 ASSESSMENT — PAIN SCALES - GENERAL: PAINLEVEL_OUTOF10: 0

## 2022-06-24 NOTE — CARE COORDINATION
Pt lives with his wife Jo Ann Pena. Pt was recently at [place] [address] and discharged home on 5/27/2022 with [facility]. Pt lives in a one story home. Pt has a walker and home O2. Pt is active with [facility]. Pt wife is main caregiver. Pt plans to at this time to return home with wife and continue with his services with [facility]. CM will need a inpt HC order for nursing and PT/OT at discharge. If pt is discharged after hours or over the weekend. Please complete the following. Call [facility] [phone] and inform them of pt discharge. Fax HC order and AVS to [phone].

## 2022-06-24 NOTE — PROGRESS NOTES
Pulmonary and Critical Care  Progress Note    Subjective: The patient has slightly improved  Shortness of breath has slightly improved  Chest pain none  Addressing respiratory complaints Patient is negative for  hemoptysis and cyanosis  CONSTITUTIONAL:  negative for fevers and chills      Past Medical History:     has a past medical history of Anxiety, Cirrhosis, alcoholic ([address] Diabetes mellitus ([facility] GERD (gastroesophageal reflux disease), GERD (gastroesophageal reflux disease), Hyperlipidemia, Hypertension, Portal hypertension ([address] Pulmonary nodules, Sleep apnea, SOB (shortness of breath), and Thyroid disease. has a past surgical history that includes Cholecystectomy; Vasectomy; Colonoscopy; Endoscopy, colon, diagnostic; Foot surgery; Upper gastrointestinal endoscopy (N/A, 1/19/2022); Tonsillectomy; and Appendectomy. reports that he quit smoking about 46 years ago. He has a 14.00 pack-year smoking history. He has never used smokeless tobacco. He reports previous alcohol use. He reports that he does not use drugs. Family history:  family history includes Diabetes in an other family member; Hypertension in his brother.     Allergies   Allergen Reactions    Lisinopril Swelling     Tongue swelling      Zetia [Ezetimibe] Swelling     Facial swelling    Atorvastatin     Codeine Other (See Comments)     Blood pressure drops    Hydrochlorothiazide     Hydroxyzine      Fatigue and depressed    Lexapro [Escitalopram Oxalate]      Increased depression    Pravastatin      Social History:    Reviewed; no changes    Objective:   PHYSICAL EXAM:        VITALS:  BP (!) 117/59   Pulse 79   Temp 98.2 °F (36.8 °C) (Oral)   Resp 18   Ht 5' 8\" (1.727 m)   Wt 237 lb 6.4 oz (107.7 kg)   SpO2 98%   BMI 36.10 kg/m²     24HR INTAKE/OUTPUT:      Intake/Output Summary (Last 24 hours) at 6/24/2022 1118  Last data filed at 6/24/2022 0857  Gross per 24 hour   Intake 25 ml   Output 2750 ml   Net -2725 ml ([address]    Secondary esophageal varices without bleeding (HCC)    Gastric polyps    Hearing loss    Sepsis (HCC)    Hepatic encephalopathy (HCC)    Recurrent right pleural effusion    Acute on chronic respiratory failure with hypoxemia (HCC)    Acute respiratory failure (HCC)    Acute kidney injury superimposed on chronic kidney disease (HCC)    Generalized weakness    Oropharyngeal dysphagia    Cellulitis of left lower extremity    Type 2 diabetes mellitus without complication, without long-term current use of insulin ([address]   [facility] discharge follow-up    Thyroid disease    Acute on chronic respiratory failure ([address]       Plan:   1. Overall the patient has improved. 2. Agree with Dr Kathe Harper. 3. Discussed with the family.     Jaxson White MD   6/24/2022  11:18 AM

## 2022-06-25 LAB
ALBUMIN SERPL-MCNC: 3.4 GM/DL (ref 3.4–5)
ALP BLD-CCNC: 144 IU/L (ref 40–128)
ALT SERPL-CCNC: 42 U/L (ref 10–40)
ANION GAP SERPL CALCULATED.3IONS-SCNC: 3 MMOL/L (ref 4–16)
APTT: 32.3 SECONDS (ref 25.1–37.1)
AST SERPL-CCNC: 64 IU/L (ref 15–37)
BASOPHILS ABSOLUTE: 0 K/CU MM
BASOPHILS RELATIVE PERCENT: 0.7 % (ref 0–1)
BILIRUB SERPL-MCNC: 0.9 MG/DL (ref 0–1)
BUN BLDV-MCNC: 13 MG/DL (ref 6–23)
CALCIUM SERPL-MCNC: 10.3 MG/DL (ref 8.3–10.6)
CHLORIDE BLD-SCNC: 99 MMOL/L (ref 99–110)
CO2: 38 MMOL/L (ref 21–32)
CREAT SERPL-MCNC: 0.9 MG/DL (ref 0.9–1.3)
DIFFERENTIAL TYPE: ABNORMAL
EOSINOPHILS ABSOLUTE: 0.4 K/CU MM
EOSINOPHILS RELATIVE PERCENT: 6 % (ref 0–3)
GFR AFRICAN AMERICAN: >60 ML/MIN/1.73M2
GFR NON-AFRICAN AMERICAN: >60 ML/MIN/1.73M2
GLUCOSE BLD-MCNC: 112 MG/DL (ref 70–99)
HCT VFR BLD CALC: 39.3 % (ref 42–52)
HEMOGLOBIN: 12.6 GM/DL (ref 13.5–18)
IMMATURE NEUTROPHIL %: 0.3 % (ref 0–0.43)
INR BLD: 1.11 INDEX
LYMPHOCYTES ABSOLUTE: 0.7 K/CU MM
LYMPHOCYTES RELATIVE PERCENT: 11.1 % (ref 24–44)
MCH RBC QN AUTO: 32.3 PG (ref 27–31)
MCHC RBC AUTO-ENTMCNC: 32.1 % (ref 32–36)
MCV RBC AUTO: 100.8 FL (ref 78–100)
MONOCYTES ABSOLUTE: 0.6 K/CU MM
MONOCYTES RELATIVE PERCENT: 9.6 % (ref 0–4)
NUCLEATED RBC %: 0 %
PDW BLD-RTO: 14.6 % (ref 11.7–14.9)
PLATELET # BLD: 136 K/CU MM (ref 140–440)
PMV BLD AUTO: 10.3 FL (ref 7.5–11.1)
POTASSIUM SERPL-SCNC: 3.9 MMOL/L (ref 3.5–5.1)
PROCALCITONIN: 0.04
PROTHROMBIN TIME: 14.3 SECONDS (ref 11.7–14.5)
RBC # BLD: 3.9 M/CU MM (ref 4.6–6.2)
SEGMENTED NEUTROPHILS ABSOLUTE COUNT: 4.4 K/CU MM
SEGMENTED NEUTROPHILS RELATIVE PERCENT: 72.3 % (ref 36–66)
SODIUM BLD-SCNC: 140 MMOL/L (ref 135–145)
TOTAL IMMATURE NEUTOROPHIL: 0.02 K/CU MM
TOTAL NUCLEATED RBC: 0 K/CU MM
TOTAL PROTEIN: 6.9 GM/DL (ref 6.4–8.2)
WBC # BLD: 6.1 K/CU MM (ref 4–10.5)

## 2022-06-25 PROCEDURE — 6370000000 HC RX 637 (ALT 250 FOR IP): Performed by: INTERNAL MEDICINE

## 2022-06-25 PROCEDURE — 99232 SBSQ HOSP IP/OBS MODERATE 35: CPT | Performed by: INTERNAL MEDICINE

## 2022-06-25 PROCEDURE — 2580000003 HC RX 258: Performed by: PHYSICIAN ASSISTANT

## 2022-06-25 PROCEDURE — 6370000000 HC RX 637 (ALT 250 FOR IP): Performed by: PHYSICIAN ASSISTANT

## 2022-06-25 PROCEDURE — 2700000000 HC OXYGEN THERAPY PER DAY

## 2022-06-25 PROCEDURE — 94640 AIRWAY INHALATION TREATMENT: CPT

## 2022-06-25 PROCEDURE — 94761 N-INVAS EAR/PLS OXIMETRY MLT: CPT

## 2022-06-25 PROCEDURE — 80053 COMPREHEN METABOLIC PANEL: CPT

## 2022-06-25 PROCEDURE — 85730 THROMBOPLASTIN TIME PARTIAL: CPT

## 2022-06-25 PROCEDURE — 1200000000 HC SEMI PRIVATE

## 2022-06-25 PROCEDURE — 85610 PROTHROMBIN TIME: CPT

## 2022-06-25 PROCEDURE — 36415 COLL VENOUS BLD VENIPUNCTURE: CPT

## 2022-06-25 PROCEDURE — 85025 COMPLETE CBC W/AUTO DIFF WBC: CPT

## 2022-06-25 RX ADMIN — LEVOTHYROXINE SODIUM 50 MCG: 0.05 TABLET ORAL at 06:17

## 2022-06-25 RX ADMIN — LACTULOSE 20 G: 10 SOLUTION ORAL at 13:55

## 2022-06-25 RX ADMIN — LACTULOSE 20 G: 10 SOLUTION ORAL at 20:28

## 2022-06-25 RX ADMIN — SODIUM CHLORIDE, PRESERVATIVE FREE 10 ML: 5 INJECTION INTRAVENOUS at 20:29

## 2022-06-25 RX ADMIN — ALBUTEROL SULFATE 2 PUFF: 90 AEROSOL, METERED RESPIRATORY (INHALATION) at 08:50

## 2022-06-25 RX ADMIN — RIFAXIMIN 550 MG: 550 TABLET ORAL at 20:28

## 2022-06-25 RX ADMIN — SODIUM CHLORIDE, PRESERVATIVE FREE 10 ML: 5 INJECTION INTRAVENOUS at 10:19

## 2022-06-25 RX ADMIN — SPIRONOLACTONE 50 MG: 50 TABLET ORAL at 10:19

## 2022-06-25 RX ADMIN — MIDODRINE HYDROCHLORIDE 5 MG: 5 TABLET ORAL at 11:19

## 2022-06-25 RX ADMIN — PANTOPRAZOLE SODIUM 40 MG: 40 TABLET, DELAYED RELEASE ORAL at 10:18

## 2022-06-25 RX ADMIN — DULOXETINE HYDROCHLORIDE 60 MG: 30 CAPSULE, DELAYED RELEASE ORAL at 10:19

## 2022-06-25 RX ADMIN — LACTULOSE 20 G: 10 SOLUTION ORAL at 10:18

## 2022-06-25 RX ADMIN — RIFAXIMIN 550 MG: 550 TABLET ORAL at 10:19

## 2022-06-25 ASSESSMENT — ENCOUNTER SYMPTOMS
WHEEZING: 0
SHORTNESS OF BREATH: 0
DIARRHEA: 0
VOMITING: 0
NAUSEA: 0
ABDOMINAL PAIN: 0
CONSTIPATION: 0
COUGH: 0

## 2022-06-25 ASSESSMENT — PAIN SCALES - GENERAL
PAINLEVEL_OUTOF10: 0
PAINLEVEL_OUTOF10: 0

## 2022-06-25 ASSESSMENT — PAIN SCALES - WONG BAKER
WONGBAKER_NUMERICALRESPONSE: 0

## 2022-06-25 NOTE — PLAN OF CARE
Problem: Discharge Planning  Goal: Discharge to home or other facility with appropriate resources  Outcome: Progressing  Flowsheets (Taken 6/25/2022 1001)  Discharge to home or other facility with appropriate resources: Identify barriers to discharge with patient and caregiver

## 2022-06-25 NOTE — PROGRESS NOTES
V2.0  [facility] Progress Note      Name:  Jeffery Serrato /Age/Sex: 1945  (78 y.o. male)   MRN & CSN:  [id number] & [id number] Encounter Date/Time: 2022 8:58 PM EDT    Location:  [facility] PCP: Warner Dumas MD       Hospital Day: 4    Assessment and Plan:   78 y.o. male with pmh of type 2 diabetes, esophageal varices, hyperlipidemia, JENNIFER, alcoholic cirrhosis pulmonary nodules, GERD, obesity, hypothyroidism, anxiety, CKD who presents to the ER for evaluation of increasing dyspnea.  Off note patient was recently hospitalized for hepatic encephalopathy needing intubation and stayed in hospital for 16 days in May 2022. Patient has had about a 25 lbs weight gain since his discharge        # Acute on chronic hypoxic/hypercapnic respiratory failure secondary to recurrent right-sided pleural effusion 2/2 cirrhosis  IR consulted by ER, patient went directly to have thoracentesis where they removed 2L of fluid  Echo on 22 with EF 50-55%  PCO2 of 73, higher than baseline with somnolence, started on BiPAP. Pulm consulted, more awake and alert. On 3 L nasal cannula, follow-up repeat ABG  Noted pulmonology plan for repeat thoracentesis     # Decompensated alcoholic cirrhosis with hyperammonia and hydrothorax. Low suspicion for SBP: no fevers, significant abdominal distension, or leukocytosis  S/p paracentesis with 6.8 L fluid removal in , Trend CMP   Continue home Lasix switched to IV as tolerated spironolactone, Xifaxan, lactulose  Hold anticoagulation until definitive plan for paracentesis, use SCDs  GI consult: Dr. Harriet Espino- recommendations noted     # Hypotension. Continue midodrine/albumin. Will hold Lasix for now due to large volume paracentesis and thoracentesis.     # Hypokalemia. Replaced. Patient seen and examined at bedside. Laying comfortably in bed not in acute distress. No acute events reported overnight.   Patient denies any chest pain, fever, headache, shortness of breath, abdominal pain, nausea or vomiting, diarrhea or new extremity weakness           · Chronic transaminitis  · CKD. Baseline fluctuates between 1.0 and 1.6  · Detectable troponin. Trend x3, likely  secondary to fluid overload/CANDIE.  Repeat EKG today  · Anxiety. Continue cymbalta  · Hypothyroidism. Continue Synthroid  · Obesity BMI 39  · GERD  · History of pulmonary nodules  · Obstructive sleep apnea  · Hyperlipidemia  · History of esophageal varices  · Type 2 diabetes  · Fairly controlled, last hemoglobin A1C 6.1 in 2021     Guarded prognosis, discussed with patient spouse at bedside. DVT prop Lovenox  Full code, confirmed by patient's wife at bedside    Discharge planning  Discussed case with gastroenterology and with patient and his wife. We will contact [facility] to present the case to them for evaluation. 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 He has hepatic encephalopathy which has responded to lactulose and rifaximin. Pulmonology is concerned about placing a Pleurx catheter as he would become protein depleted. Gastroenterology is concerned about a TIPS because of the potential for developing encephalopathy. Further, it appears that our interventional radiology may not be able to perform a TIPS. The hope is that [facility] could evaluate him to determine whether he is a candidate for transplant, TIPS, Pleurx, or something else. Diet ADULT DIET; Regular   DVT Prophylaxis [] Lovenox, []  Heparin, [] SCDs, [] Ambulation,  [] Eliquis, [] Xarelto   Code Status Full Code   Disposition Patient requires continued admission due to acuity of illness   Surrogate Decision Maker/ POA Spouse Gaby     Subjective:     Chief Complaint: Shortness of Breath (states he got out of the hospital on 05/27/2022. He was diagnoised with hepatic enciephaly)       He has no new complaints. He had an uneventful night. His wife mentions to me that he is much improved from a mental status perspective.   His encephalopathy seems to have resolved and he is essentially back to baseline. Review of Systems:    Review of Systems   Constitutional: Positive for fatigue. Negative for activity change, chills and fever. Respiratory: Negative for cough, shortness of breath and wheezing. Cardiovascular: Negative for chest pain and palpitations. Gastrointestinal: Negative for abdominal pain, constipation, diarrhea, nausea and vomiting. Genitourinary: Negative for frequency and urgency. Skin: Negative for rash. All other systems reviewed and are negative. Objective: Intake/Output Summary (Last 24 hours) at 6/24/2022 2058  Last data filed at 6/24/2022 1948  Gross per 24 hour   Intake --   Output 3200 ml   Net -3200 ml        Vitals:   Vitals:    06/24/22 2040   BP: 125/66   Pulse: 74   Resp: 20   Temp: 98.2 °F (36.8 °C)   SpO2: 97%       Physical Exam:     Physical Exam  Vitals and nursing note reviewed. Constitutional:       General: He is not in acute distress. Appearance: Normal appearance. He is normal weight. He is not ill-appearing, toxic-appearing or diaphoretic. HENT:      Head: Normocephalic and atraumatic. Eyes:      General: No scleral icterus. Conjunctiva/sclera: Conjunctivae normal.   Cardiovascular:      Rate and Rhythm: Normal rate and regular rhythm. Heart sounds: Normal heart sounds. No murmur heard. Pulmonary:      Effort: Pulmonary effort is normal. No respiratory distress. Breath sounds: Normal breath sounds. No wheezing. Abdominal:      General: Bowel sounds are normal. There is no distension. Palpations: Abdomen is soft. Tenderness: There is no abdominal tenderness. There is no guarding. Musculoskeletal:         General: No deformity or signs of injury. Normal range of motion. Cervical back: Normal range of motion and neck supple. No rigidity or tenderness. Right lower leg: No edema. Left lower leg: No edema.    Skin:     Coloration: Skin is not jaundiced. Neurological:      General: No focal deficit present. Mental Status: He is alert. Cranial Nerves: No cranial nerve deficit. Motor: No weakness.            Medications:   Medications:    albuterol sulfate HFA  2 puff Inhalation BID    lactulose  20 g Oral TID    midodrine  5 mg Oral TID WC    levothyroxine  50 mcg Oral Daily    spironolactone  50 mg Oral Daily    rifAXIMin  550 mg Oral BID    DULoxetine  60 mg Oral Daily    sodium chloride flush  10 mL IntraVENous 2 times per day    [Held by provider] enoxaparin  40 mg SubCUTAneous Daily    [Held by provider] furosemide  40 mg Oral BID    pantoprazole  40 mg Oral Daily      Infusions:    sodium chloride       PRN Meds: potassium chloride, 40 mEq, PRN   Or  potassium alternative oral replacement, 40 mEq, PRN   Or  potassium chloride, 10 mEq, PRN  ipratropium-albuterol, 1 ampule, Q4H PRN  sodium chloride flush, 10 mL, PRN  sodium chloride, , PRN  polyethylene glycol, 17 g, Daily PRN  acetaminophen, 650 mg, Q6H PRN   Or  acetaminophen, 650 mg, Q6H PRN  ondansetron, 4 mg, Q8H PRN   Or  ondansetron, 4 mg, Q6H PRN        Labs      Recent Results (from the past 24 hour(s))   Protein, Body Fluid    Collection Time: 06/24/22  9:00 AM   Result Value Ref Range    Protein, Fluid 2.2 GM/DL   Glucose, Body Fluid    Collection Time: 06/24/22  9:00 AM   Result Value Ref Range    Glucose, Fluid 135 MG/DL   Amylase, Body Fluid    Collection Time: 06/24/22  9:00 AM   Result Value Ref Range    Amylase, Fluid 38 U/L   Lactate Dehydrogenase, Body Fluid    Collection Time: 06/24/22  9:00 AM   Result Value Ref Range    LD, Fluid 53 IU/L   Cell Count with Differential, Body Fluid    Collection Time: 06/24/22  9:00 AM   Result Value Ref Range    Fluid Type RIGHT PLEURAL FLUID INDEX    RBC, Fluid 5,000 /CU MM    WBC, Fluid 211 /CU MM    Neutrophil Count, Fluid 12 %    Lymphocytes, Body Fluid 56 %    Monocyte Count, Fluid 31 %    Mesothelial, Fluid 6 /100 WBC    Other Cells, Fluid 1 EOS    CBC auto differential    Collection Time: 06/24/22  5:53 PM   Result Value Ref Range    WBC 7.3 4.0 - 10.5 K/CU MM    RBC 3.82 (L) 4.6 - 6.2 M/CU MM    Hemoglobin 12.5 (L) 13.5 - 18.0 GM/DL    Hematocrit 39.4 (L) 42 - 52 %    .1 (H) 78 - 100 FL    MCH 32.7 (H) 27 - 31 PG    MCHC 31.7 (L) 32.0 - 36.0 %    RDW 14.6 11.7 - 14.9 %    Platelets 236 855 - 107 K/CU MM    MPV 10.6 7.5 - 11.1 FL    Differential Type AUTOMATED DIFFERENTIAL     Segs Relative 73.3 (H) 36 - 66 %    Lymphocytes % 9.6 (L) 24 - 44 %    Monocytes % 10.5 (H) 0 - 4 %    Eosinophils % 5.6 (H) 0 - 3 %    Basophils % 0.7 0 - 1 %    Segs Absolute 5.3 K/CU MM    Lymphocytes Absolute 0.7 K/CU MM    Monocytes Absolute 0.8 K/CU MM    Eosinophils Absolute 0.4 K/CU MM    Basophils Absolute 0.1 K/CU MM    Nucleated RBC % 0.0 %    Total Nucleated RBC 0.0 K/CU MM    Total Immature Neutrophil 0.02 K/CU MM    Immature Neutrophil % 0.3 0 - 0.43 %   Comprehensive Metabolic Panel w/ Reflex to MG    Collection Time: 06/24/22  5:53 PM   Result Value Ref Range    Sodium 139 135 - 145 MMOL/L    Potassium 4.0 3.5 - 5.1 MMOL/L    Chloride 97 (L) 99 - 110 mMol/L    CO2 36 (H) 21 - 32 MMOL/L    BUN 15 6 - 23 MG/DL    CREATININE 1.0 0.9 - 1.3 MG/DL    Glucose 127 (H) 70 - 99 MG/DL    Calcium 10.2 8.3 - 10.6 MG/DL    Albumin 3.7 3.4 - 5.0 GM/DL    Total Protein 7.0 6.4 - 8.2 GM/DL    Total Bilirubin 0.7 0.0 - 1.0 MG/DL    ALT 41 (H) 10 - 40 U/L    AST 72 (H) 15 - 37 IU/L    Alkaline Phosphatase 135 (H) 40 - 129 IU/L    GFR Non-African American >60 >60 mL/min/1.73m2    GFR African American >60 >60 mL/min/1.73m2    Anion Gap 6 4 - 16   Protime/INR & PTT    Collection Time: 06/24/22  5:53 PM   Result Value Ref Range    Protime 14.6 (H) 11.7 - 14.5 SECONDS    INR 1.13 INDEX    aPTT 31.9 25.1 - 37.1 SECONDS   Phosphorus    Collection Time: 06/24/22  5:53 PM   Result Value Ref Range    Phosphorus 2.1 (L) 2.5 - 4.9 MG/DL   Magnesium Collection Time: 06/24/22  5:53 PM   Result Value Ref Range    Magnesium 1.8 1.8 - 2.4 mg/dl   Brain Natriuretic Peptide    Collection Time: 06/24/22  5:53 PM   Result Value Ref Range    Pro-BNP 2,741 (H) <300 PG/ML        Imaging/Diagnostics Last 24 Hours   XR CHEST PORTABLE    Result Date: 6/24/2022  EXAMINATION: ONE XRAY VIEW OF THE CHEST 6/24/2022 8:58 am COMPARISON: 06/21/2022 HISTORY: ORDERING SYSTEM PROVIDED HISTORY: POST RIGHT THORACENTESIS TECHNOLOGIST PROVIDED HISTORY: Reason for exam:->POST RIGHT THORACENTESIS Reason for Exam: POST RIGHT THORACENTESIS FINDINGS: Cardiomediastinal is stable. Slight decrease in volume of right pleural effusion, remains large. No pneumothorax. Left lung is clear. Slight decrease in volume of right pleural effusion, remains large. No pneumothorax.        Electronically signed by Delon Nash MD on 6/24/2022 at 8:58 PM

## 2022-06-25 NOTE — PROGRESS NOTES
Pulmonary and Critical Care  Progress Note      VITALS:  BP (!) 147/75   Pulse 88   Temp 98.3 °F (36.8 °C) (Oral)   Resp 18   Ht 5' 8\" (1.727 m)   Wt 237 lb 4.8 oz (107.6 kg)   SpO2 96%   BMI 36.08 kg/m²     Subjective:   CHIEF COMPLAINT :SOB     HPI:                The patient is lying in the bed. He is not in acute resp distress    Objective:   PHYSICAL EXAM:    LUNGS:Occasional basal crackles  Abd-soft, BS+,NT  Ext- no pedal edema  CVS-s1s2, no murmurs      DATA:    CBC:  Recent Labs     06/23/22  0839 06/24/22  1753 06/25/22  0358   WBC 5.8 7.3 6.1   RBC 3.86* 3.82* 3.90*   HGB 12.6* 12.5* 12.6*   HCT 39.4* 39.4* 39.3*    141 136*   .1* 103.1* 100.8*   MCH 32.6* 32.7* 32.3*   MCHC 32.0 31.7* 32.1   RDW 14.8 14.6 14.6   SEGSPCT 71.4* 73.3* 72.3*      BMP:  Recent Labs     06/23/22  0839 06/24/22  1753 06/25/22  0358    139 140   K 3.1* 4.0 3.9   CL 97* 97* 99   CO2 36* 36* 38*   BUN 23 15 13   CREATININE 1.2 1.0 0.9   CALCIUM 9.7 10.2 10.3   GLUCOSE 113* 127* 112*      ABG:  Recent Labs     06/23/22  1100   PH 7.39   PO2ART 73*   BNI6QXV 63.0*   O2SAT 93.3*     BNP  No results found for: BNP   D-Dimer:  Lab Results   Component Value Date    DDIMER 1606 (H) 09/13/2021      1.  Radiology: None      Assessment/Plan     Patient Active Problem List    Diagnosis Date Noted    Thyroid disease 06/21/2022     Priority: Medium    Acute on chronic respiratory failure ([facility] [address] 06/21/2022     Priority: Medium    Cellulitis of left lower extremity 06/08/2022     Priority: Medium    Type 2 diabetes mellitus without complication, without long-term current use of insulin ([facility] 06/08/2022     Priority: [address] discharge follow-up 06/08/2022     Priority: Medium    Generalized weakness      Priority: Medium    Oropharyngeal dysphagia      Priority: Medium    Acute kidney injury superimposed on chronic kidney disease ([facility]      Priority: Medium    Acute respiratory failure ([facility] 05/04/2022 Priority: Medium    Acute on chronic respiratory failure with hypoxemia (HCC)      Priority: Medium    Recurrent right pleural effusion 05/02/2022     Priority: Medium    Sepsis ([facility] [address] 03/19/2022    Hepatic encephalopathy ([facility] [address] 03/19/2022    Hearing loss 03/11/2022    Cirrhosis of liver with ascites ([facility] [facility]     Secondary esophageal varices without bleeding (HCC)     Gastric polyps     SOB (shortness of breath) 09/13/2021    Portal hypertension ([facility] [address] 08/30/2021    JENNIFER (obstructive sleep apnea) 08/09/2021    Bilateral hearing loss 05/07/2021    Increased ammonia level 05/07/2021    Class 3 severe obesity with body mass index (BMI) of 40.0 to 44.9 in adult [facility]) 09/21/2020    Hyperglycemia 12/04/2019    Acquired hypothyroidism 12/04/2019    Pulmonary nodules 58/65/6478    Alcoholic cirrhosis of liver with ascites (HCC)-Dr Jacques 08/08/2019    Mixed hyperlipidemia 08/08/2019    Hypertension 08/08/2019    History of alcohol abuse 08/08/2019    Gastroesophageal reflux disease 08/08/2019    Anxiety 08/08/2019    Shortness of breath 05/26/2019    History of colonic polyps 01/15/2019   1. Recurrent right effusion secondary to hepatic hydrothorax. 2.  Possible underlying COPD. 3.  Alcoholic cirrhosis       1. CXR in am  2. Diuresis  3. ICS  4. OOB  5. BIPAP prn  6. Possible TIPS  7.  C/w present management    Electronically signed by Bahman Constantino MD on 6/25/2022 at 12:46 PM

## 2022-06-25 NOTE — PROGRESS NOTES
V2.0  [facility] [place] Note      Name:  Padilla Cedeno /Age/Sex: 1945  (78 y.o. male)   MRN & CSN:  [id number] & [id number] Encounter Date/Time: 2022 5:51 PM EDT    Location:  [address] [place] [address] PCP: Kareen Augustine MD       Hospital Day: 5    Assessment and Plan:   78 y.o. male with pmh of type 2 diabetes, esophageal varices, hyperlipidemia, JENNIFER, alcoholic cirrhosis pulmonary nodules, GERD, obesity, hypothyroidism, anxiety, CKD who presents to the ER for evaluation of increasing dyspnea.  Of note patient was recently hospitalized for hepatic encephalopathy needing intubation and stayed in hospital for 16 days in May 2022. Patient has had about a 25 lbs weight gain since his discharge        Acute on chronic hypoxic/hypercapnic respiratory failure secondary to recurrent right-sided pleural effusion 2/2 cirrhosis  IR consulted by ER, patient went directly to have thoracentesis where they removed 2L of fluid  Echo on 22 with EF 50-55%  PCO2 of 73, higher than baseline with somnolence, started on BiPAP. Pulm consulted, more awake and alert.  On 3 L nasal cannula, follow-up repeat ABG  Currently appears stable. Laying flat. Denies significant shortness of breath.     Decompensated alcoholic cirrhosis with hyperammonia and hydrothorax. Low suspicion for SBP: no fevers, significant abdominal distension, or leukocytosis. S/p paracentesis with 6.8 L fluid removal in , Trend CMP   Home Lasix switched to IV but then held due to hypotension. Still getting spironolactone, Xifaxan, lactulose. Hold anticoagulation until definitive plan for paracentesis, use SCDs  Discussed extensively with GI. Referral made to [facility] for consideration of TIPS vs Pleurx vs transplant vs other ? Hypotension.    Continue midodrine. Last dose of albumin 36 hours ago approx.   Will hold Lasix for now due to large volume paracentesis and thoracentesis.     Hypokalemia.    Replaced.  Patient seen and examined at palpitations. Gastrointestinal: Negative for abdominal pain, constipation, diarrhea, nausea and vomiting. Genitourinary: Negative for frequency and urgency. Skin: Negative for rash. All other systems reviewed and are negative. Objective: Intake/Output Summary (Last 24 hours) at 6/25/2022 1751  Last data filed at 6/25/2022 1354  Gross per 24 hour   Intake 420 ml   Output 950 ml   Net -530 ml        Vitals:   Vitals:    06/25/22 1645   BP: 135/65   Pulse: 88   Resp: 27   Temp: 97.8 °F (36.6 °C)   SpO2: 93%       Physical Exam:     Physical Exam  Vitals and nursing note reviewed. Constitutional:       General: He is not in acute distress. Appearance: Normal appearance. He is normal weight. He is not ill-appearing, toxic-appearing or diaphoretic. HENT:      Head: Normocephalic and atraumatic. Eyes:      General: No scleral icterus. Conjunctiva/sclera: Conjunctivae normal.   Cardiovascular:      Rate and Rhythm: Normal rate and regular rhythm. Heart sounds: Normal heart sounds. No murmur heard. Pulmonary:      Effort: Pulmonary effort is normal. No respiratory distress. Breath sounds: Normal breath sounds. No wheezing. Abdominal:      General: Bowel sounds are normal. There is no distension. Palpations: Abdomen is soft. Tenderness: There is no abdominal tenderness. There is no guarding. Musculoskeletal:         General: No deformity or signs of injury. Normal range of motion. Cervical back: Normal range of motion and neck supple. No rigidity or tenderness. Right lower leg: No edema. Left lower leg: No edema. Skin:     Coloration: Skin is not jaundiced. Neurological:      General: No focal deficit present. Mental Status: He is alert. Cranial Nerves: No cranial nerve deficit. Motor: No weakness.            Medications:   Medications:    albuterol sulfate HFA  2 puff Inhalation BID    lactulose  20 g Oral TID    midodrine 5 mg Oral TID     levothyroxine  50 mcg Oral Daily    spironolactone  50 mg Oral Daily    rifAXIMin  550 mg Oral BID    DULoxetine  60 mg Oral Daily    sodium chloride flush  10 mL IntraVENous 2 times per day    [Held by provider] enoxaparin  40 mg SubCUTAneous Daily    [Held by provider] furosemide  40 mg Oral BID    pantoprazole  40 mg Oral Daily      Infusions:    sodium chloride       PRN Meds: potassium chloride, 40 mEq, PRN   Or  potassium alternative oral replacement, 40 mEq, PRN   Or  potassium chloride, 10 mEq, PRN  ipratropium-albuterol, 1 ampule, Q4H PRN  sodium chloride flush, 10 mL, PRN  sodium chloride, , PRN  polyethylene glycol, 17 g, Daily PRN  acetaminophen, 650 mg, Q6H PRN   Or  acetaminophen, 650 mg, Q6H PRN  ondansetron, 4 mg, Q8H PRN   Or  ondansetron, 4 mg, Q6H PRN        Labs      Recent Results (from the past 24 hour(s))   CBC auto differential    Collection Time: 06/24/22  5:53 PM   Result Value Ref Range    WBC 7.3 4.0 - 10.5 K/CU MM    RBC 3.82 (L) 4.6 - 6.2 M/CU MM    Hemoglobin 12.5 (L) 13.5 - 18.0 GM/DL    Hematocrit 39.4 (L) 42 - 52 %    .1 (H) 78 - 100 FL    MCH 32.7 (H) 27 - 31 PG    MCHC 31.7 (L) 32.0 - 36.0 %    RDW 14.6 11.7 - 14.9 %    Platelets 221 445 - 440 K/CU MM    MPV 10.6 7.5 - 11.1 FL    Differential Type AUTOMATED DIFFERENTIAL     Segs Relative 73.3 (H) 36 - 66 %    Lymphocytes % 9.6 (L) 24 - 44 %    Monocytes % 10.5 (H) 0 - 4 %    Eosinophils % 5.6 (H) 0 - 3 %    Basophils % 0.7 0 - 1 %    Segs Absolute 5.3 K/CU MM    Lymphocytes Absolute 0.7 K/CU MM    Monocytes Absolute 0.8 K/CU MM    Eosinophils Absolute 0.4 K/CU MM    Basophils Absolute 0.1 K/CU MM    Nucleated RBC % 0.0 %    Total Nucleated RBC 0.0 K/CU MM    Total Immature Neutrophil 0.02 K/CU MM    Immature Neutrophil % 0.3 0 - 0.43 %   Comprehensive Metabolic Panel w/ Reflex to MG    Collection Time: 06/24/22  5:53 PM   Result Value Ref Range    Sodium 139 135 - 145 MMOL/L    Potassium 4.0 3.5 - 5.1 MMOL/L    Chloride 97 (L) 99 - 110 mMol/L    CO2 36 (H) 21 - 32 MMOL/L    BUN 15 6 - 23 MG/DL    CREATININE 1.0 0.9 - 1.3 MG/DL    Glucose 127 (H) 70 - 99 MG/DL    Calcium 10.2 8.3 - 10.6 MG/DL    Albumin 3.7 3.4 - 5.0 GM/DL    Total Protein 7.0 6.4 - 8.2 GM/DL    Total Bilirubin 0.7 0.0 - 1.0 MG/DL    ALT 41 (H) 10 - 40 U/L    AST 72 (H) 15 - 37 IU/L    Alkaline Phosphatase 135 (H) 40 - 129 IU/L    GFR Non-African American >60 >60 mL/min/1.73m2    GFR African American >60 >60 mL/min/1.73m2    Anion Gap 6 4 - 16   Protime/INR & PTT    Collection Time: 06/24/22  5:53 PM   Result Value Ref Range    Protime 14.6 (H) 11.7 - 14.5 SECONDS    INR 1.13 INDEX    aPTT 31.9 25.1 - 37.1 SECONDS   Phosphorus    Collection Time: 06/24/22  5:53 PM   Result Value Ref Range    Phosphorus 2.1 (L) 2.5 - 4.9 MG/DL   Magnesium    Collection Time: 06/24/22  5:53 PM   Result Value Ref Range    Magnesium 1.8 1.8 - 2.4 mg/dl   Brain Natriuretic Peptide    Collection Time: 06/24/22  5:53 PM   Result Value Ref Range    Pro-BNP 2,741 (H) <300 PG/ML   Procalcitonin    Collection Time: 06/24/22  5:53 PM   Result Value Ref Range    Procalcitonin 0.037    CBC auto differential    Collection Time: 06/25/22  3:58 AM   Result Value Ref Range    WBC 6.1 4.0 - 10.5 K/CU MM    RBC 3.90 (L) 4.6 - 6.2 M/CU MM    Hemoglobin 12.6 (L) 13.5 - 18.0 GM/DL    Hematocrit 39.3 (L) 42 - 52 %    .8 (H) 78 - 100 FL    MCH 32.3 (H) 27 - 31 PG    MCHC 32.1 32.0 - 36.0 %    RDW 14.6 11.7 - 14.9 %    Platelets 573 (L) 074 - 440 K/CU MM    MPV 10.3 7.5 - 11.1 FL    Differential Type AUTOMATED DIFFERENTIAL     Segs Relative 72.3 (H) 36 - 66 %    Lymphocytes % 11.1 (L) 24 - 44 %    Monocytes % 9.6 (H) 0 - 4 %    Eosinophils % 6.0 (H) 0 - 3 %    Basophils % 0.7 0 - 1 %    Segs Absolute 4.4 K/CU MM    Lymphocytes Absolute 0.7 K/CU MM    Monocytes Absolute 0.6 K/CU MM    Eosinophils Absolute 0.4 K/CU MM    Basophils Absolute 0.0 K/CU MM    Nucleated RBC % 0.0 % Total Nucleated RBC 0.0 K/CU MM    Total Immature Neutrophil 0.02 K/CU MM    Immature Neutrophil % 0.3 0 - 0.43 %   Comprehensive Metabolic Panel w/ Reflex to MG    Collection Time: 06/25/22  3:58 AM   Result Value Ref Range    Sodium 140 135 - 145 MMOL/L    Potassium 3.9 3.5 - 5.1 MMOL/L    Chloride 99 99 - 110 mMol/L    CO2 38 (H) 21 - 32 MMOL/L    BUN 13 6 - 23 MG/DL    CREATININE 0.9 0.9 - 1.3 MG/DL    Glucose 112 (H) 70 - 99 MG/DL    Calcium 10.3 8.3 - 10.6 MG/DL    Albumin 3.4 3.4 - 5.0 GM/DL    Total Protein 6.9 6.4 - 8.2 GM/DL    Total Bilirubin 0.9 0.0 - 1.0 MG/DL    ALT 42 (H) 10 - 40 U/L    AST 64 (H) 15 - 37 IU/L    Alkaline Phosphatase 144 (H) 40 - 128 IU/L    GFR Non-African American >60 >60 mL/min/1.73m2    GFR African American >60 >60 mL/min/1.73m2    Anion Gap 3 (L) 4 - 16   Protime/INR & PTT    Collection Time: 06/25/22  3:58 AM   Result Value Ref Range    Protime 14.3 11.7 - 14.5 SECONDS    INR 1.11 INDEX    aPTT 32.3 25.1 - 37.1 SECONDS        Imaging/Diagnostics Last 24 Hours   XR CHEST PORTABLE    Result Date: 6/24/2022  EXAMINATION: ONE XRAY VIEW OF THE CHEST 6/24/2022 8:58 am COMPARISON: 06/21/2022 HISTORY: ORDERING SYSTEM PROVIDED HISTORY: POST RIGHT THORACENTESIS TECHNOLOGIST PROVIDED HISTORY: Reason for exam:->POST RIGHT THORACENTESIS Reason for Exam: POST RIGHT THORACENTESIS FINDINGS: Cardiomediastinal is stable. Slight decrease in volume of right pleural effusion, remains large. No pneumothorax. Left lung is clear. Slight decrease in volume of right pleural effusion, remains large. No pneumothorax.        Electronically signed by Peter Fraire MD on 6/25/2022 at 5:51 PM

## 2022-06-26 ENCOUNTER — APPOINTMENT (OUTPATIENT)
Dept: GENERAL RADIOLOGY | Age: 77
DRG: 432 | End: 2022-06-26
Payer: MEDICARE

## 2022-06-26 VITALS
DIASTOLIC BLOOD PRESSURE: 59 MMHG | HEART RATE: 86 BPM | OXYGEN SATURATION: 97 % | HEIGHT: 68 IN | TEMPERATURE: 98.2 F | RESPIRATION RATE: 17 BRPM | BODY MASS INDEX: 36.37 KG/M2 | WEIGHT: 240 LBS | SYSTOLIC BLOOD PRESSURE: 134 MMHG

## 2022-06-26 PROBLEM — J96.20 ACUTE ON CHRONIC RESPIRATORY FAILURE (HCC): Status: RESOLVED | Noted: 2022-06-21 | Resolved: 2022-06-26

## 2022-06-26 LAB
ALBUMIN SERPL-MCNC: 3.3 GM/DL (ref 3.4–5)
ALP BLD-CCNC: 148 IU/L (ref 40–128)
ALT SERPL-CCNC: 40 U/L (ref 10–40)
ANION GAP SERPL CALCULATED.3IONS-SCNC: 4 MMOL/L (ref 4–16)
APTT: 32.9 SECONDS (ref 25.1–37.1)
AST SERPL-CCNC: 57 IU/L (ref 15–37)
BASOPHILS ABSOLUTE: 0.1 K/CU MM
BASOPHILS RELATIVE PERCENT: 0.7 % (ref 0–1)
BILIRUB SERPL-MCNC: 0.7 MG/DL (ref 0–1)
BUN BLDV-MCNC: 13 MG/DL (ref 6–23)
CALCIUM SERPL-MCNC: 10.3 MG/DL (ref 8.3–10.6)
CHLORIDE BLD-SCNC: 97 MMOL/L (ref 99–110)
CO2: 38 MMOL/L (ref 21–32)
CREAT SERPL-MCNC: 1 MG/DL (ref 0.9–1.3)
DIFFERENTIAL TYPE: ABNORMAL
EOSINOPHILS ABSOLUTE: 0.4 K/CU MM
EOSINOPHILS RELATIVE PERCENT: 6.6 % (ref 0–3)
GFR AFRICAN AMERICAN: >60 ML/MIN/1.73M2
GFR NON-AFRICAN AMERICAN: >60 ML/MIN/1.73M2
GLUCOSE BLD-MCNC: 115 MG/DL (ref 70–99)
HCT VFR BLD CALC: 38.7 % (ref 42–52)
HEMOGLOBIN: 12.3 GM/DL (ref 13.5–18)
IMMATURE NEUTROPHIL %: 0.1 % (ref 0–0.43)
INR BLD: 1.12 INDEX
LYMPHOCYTES ABSOLUTE: 0.7 K/CU MM
LYMPHOCYTES RELATIVE PERCENT: 11.1 % (ref 24–44)
MCH RBC QN AUTO: 32.2 PG (ref 27–31)
MCHC RBC AUTO-ENTMCNC: 31.8 % (ref 32–36)
MCV RBC AUTO: 101.3 FL (ref 78–100)
MONOCYTES ABSOLUTE: 0.7 K/CU MM
MONOCYTES RELATIVE PERCENT: 9.7 % (ref 0–4)
NUCLEATED RBC %: 0 %
PDW BLD-RTO: 14.6 % (ref 11.7–14.9)
PLATELET # BLD: 137 K/CU MM (ref 140–440)
PMV BLD AUTO: 10.5 FL (ref 7.5–11.1)
POTASSIUM SERPL-SCNC: 4 MMOL/L (ref 3.5–5.1)
PROTHROMBIN TIME: 14.5 SECONDS (ref 11.7–14.5)
RBC # BLD: 3.82 M/CU MM (ref 4.6–6.2)
SEGMENTED NEUTROPHILS ABSOLUTE COUNT: 4.8 K/CU MM
SEGMENTED NEUTROPHILS RELATIVE PERCENT: 71.8 % (ref 36–66)
SODIUM BLD-SCNC: 139 MMOL/L (ref 135–145)
TOTAL IMMATURE NEUTOROPHIL: 0.01 K/CU MM
TOTAL NUCLEATED RBC: 0 K/CU MM
TOTAL PROTEIN: 6.7 GM/DL (ref 6.4–8.2)
WBC # BLD: 6.7 K/CU MM (ref 4–10.5)

## 2022-06-26 PROCEDURE — 2580000003 HC RX 258: Performed by: PHYSICIAN ASSISTANT

## 2022-06-26 PROCEDURE — 85730 THROMBOPLASTIN TIME PARTIAL: CPT

## 2022-06-26 PROCEDURE — 80053 COMPREHEN METABOLIC PANEL: CPT

## 2022-06-26 PROCEDURE — 85025 COMPLETE CBC W/AUTO DIFF WBC: CPT

## 2022-06-26 PROCEDURE — 36415 COLL VENOUS BLD VENIPUNCTURE: CPT

## 2022-06-26 PROCEDURE — 6370000000 HC RX 637 (ALT 250 FOR IP): Performed by: PHYSICIAN ASSISTANT

## 2022-06-26 PROCEDURE — 6370000000 HC RX 637 (ALT 250 FOR IP): Performed by: INTERNAL MEDICINE

## 2022-06-26 PROCEDURE — 71045 X-RAY EXAM CHEST 1 VIEW: CPT

## 2022-06-26 PROCEDURE — 85610 PROTHROMBIN TIME: CPT

## 2022-06-26 PROCEDURE — 94640 AIRWAY INHALATION TREATMENT: CPT

## 2022-06-26 RX ADMIN — RIFAXIMIN 550 MG: 550 TABLET ORAL at 07:44

## 2022-06-26 RX ADMIN — SODIUM CHLORIDE, PRESERVATIVE FREE 10 ML: 5 INJECTION INTRAVENOUS at 07:46

## 2022-06-26 RX ADMIN — ALBUTEROL SULFATE 2 PUFF: 90 AEROSOL, METERED RESPIRATORY (INHALATION) at 06:26

## 2022-06-26 RX ADMIN — DULOXETINE HYDROCHLORIDE 60 MG: 30 CAPSULE, DELAYED RELEASE ORAL at 07:44

## 2022-06-26 RX ADMIN — LEVOTHYROXINE SODIUM 50 MCG: 0.05 TABLET ORAL at 06:22

## 2022-06-26 RX ADMIN — PANTOPRAZOLE SODIUM 40 MG: 40 TABLET, DELAYED RELEASE ORAL at 07:44

## 2022-06-26 RX ADMIN — SPIRONOLACTONE 50 MG: 50 TABLET ORAL at 07:44

## 2022-06-26 RX ADMIN — LACTULOSE 20 G: 10 SOLUTION ORAL at 07:44

## 2022-06-26 NOTE — PROGRESS NOTES
Attempted to call report to JANNETH Cervantes at [facility] Was told in room and gave a call back number.   UPDATE:  Report called to [facility] @ [phone]

## 2022-06-26 NOTE — DISCHARGE SUMMARY
V2.0  Discharge Summary    Name:  Pratik Hatch /Age/Sex: 1945 (24 y.o. male)   Admit Date: 2022  Discharge Date: 22    MRN & CSN:  [id number] & [id number] Encounter Date and Time 22 9:56 AM EDT    Attending:  Donita Cochran MD Discharging Provider: Donita Cochran MD       Hospital Course:     78 y.o. male with pmh of type 2 diabetes, esophageal varices, hyperlipidemia, JENNIFER, alcoholic cirrhosis pulmonary nodules, GERD, obesity, hypothyroidism, anxiety, CKD who presents to the ER for evaluation of increasing dyspnea.  Of note patient was recently hospitalized for hepatic encephalopathy needing intubation and stayed in hospital for 16 days in May 2022. Patient has had about a 25 lbs weight gain since his discharge        Acute on chronic hypoxic/hypercapnic respiratory failure secondary to recurrent right-sided pleural effusion 2/2 cirrhosis  IR consulted by ER, patient went directly to have thoracentesis where they removed 2L of fluid  Echo on 22 with EF 50-55%  PCO2 of 73, higher than baseline with somnolence, started on BiPAP. Pulm consulted, more awake and alert.  On 3 L nasal cannula, follow-up repeat ABG  Currently appears stable. Laying flat. Denies significant shortness of breath.     Decompensated alcoholic cirrhosis with hyperammonia and hydrothorax. Hepatic encephalopathy on admission. Low suspicion for SBP: no fevers, significant abdominal distension, or leukocytosis. S/p paracentesis with 6.8 L fluid removal on , Trend CMP   Home Lasix switched to IV but then held due to hypotension. Still getting spironolactone, Xifaxan, lactulose. Encephalopathy cleared with lactulose and Rifaximin. Hold anticoagulation until definitive plan for paracentesis, use SCDs  Discussed extensively with GI. Referral made to [address] [place] [address] for consideration of TIPS vs Pleurx vs transplant vs other ?     Hypotension.    Continue midodrine if SBP is low. Last dose of albumin over2 days ago approx. BP has been improving. Lasix still being held. Hypokalemia.    Replaced.  Patient seen and examined at bedside.  Laying comfortably in bed not in acute distress.  No acute events reported overnight.  Patient denies any chest pain, fever, headache, shortness of breath, abdominal pain, nausea or vomiting, diarrhea or new extremity weakness           · Chronic transaminitis  · CKD. Baseline fluctuates between 1.0 and 1.6  · Detectable troponin. Trend x3, likely  secondary to fluid overload/CANDIE.  Repeat EKG today  · Anxiety. Continue cymbalta  · Hypothyroidism. Continue Synthroid  · Obesity BMI 39  · GERD  · History of pulmonary nodules  · Obstructive sleep apnea  · Hyperlipidemia  · History of esophageal varices  · Type 2 diabetes  · Fairly controlled, last hemoglobin A1C 6.1 in 2021     Guarded prognosis, discussed with patient spouse at bedside. DVT prop Lovenox  Full code, confirmed by patient's wife at bedside     Discharge planning  Transport team has arrived to take him to [facility]. Met with patient and wife to go over plans and answer questions. Paperwork completed.         The patient expressed appropriate understanding of, and agreement with the discharge recommendations, medications, and plan.      Consults this admission:  IP CONSULT TO HOSPITALIST  IP CONSULT TO GI  IP CONSULT TO SPIRITUAL SERVICES  IP CONSULT TO PULMONOLOGY  IP CONSULT TO IV TEAM  IP CONSULT TO INTERVENTIONAL RADIOLOGY  IP CONSULT TO [address] [place] [address]    Discharge Diagnosis:   Acute on chronic respiratory failure with hypoxemia ([address]  Recurrent hepatic hydrothorax    Discharge Instruction:   Follow up appointments: Transfer to [facility] for further care  Primary care physician: Dima Pearson MD within 2 weeks  Diet: regular diet   Activity: activity as tolerated  Disposition: Discharged to:   []Home, []C, []SNF, []Acute Rehab, []Hospice   Condition on discharge: Stable  Labs and Tests to be Followed up as an outpatient by PCP or Specialist: NA    Discharge Medications:        Medication List      STOP taking these medications    b complex vitamins capsule        ASK your doctor about these medications    albuterol sulfate  (90 Base) MCG/ACT inhaler  Commonly known as: Ventolin HFA  Inhale 2 puffs into the lungs 4 times daily as needed for Wheezing     DULoxetine 60 MG extended release capsule  Commonly known as: CYMBALTA  TAKE 1 CAPSULE BY MOUTH ONCE DAILY     ESOMEPRAZOLE MAGNESIUM PO     furosemide 40 MG tablet  Commonly known as: LASIX  Take 1 tablet by mouth daily     lactulose 10 GM/15ML solution  Commonly known as: CHRONULAC  Take 30 mLs by mouth 3 times daily Hold if having diarrhea     levothyroxine 50 MCG tablet  Commonly known as: SYNTHROID  Take 1 tablet by mouth Daily     magnesium oxide 400 MG tablet  Commonly known as: MAG-OX     MULTI VITAMIN MENS PO     nadolol 40 MG tablet  Commonly known as: CORGARD  Take 1 tablet by mouth daily     Omega-3 1000 MG Caps     rifAXIMin 550 MG tablet  Commonly known as: XIFAXAN  Take 1 tablet by mouth 2 times daily     spironolactone 100 MG tablet  Commonly known as: ALDACTONE  Take 0.5 tablets by mouth daily     tadalafil 20 MG tablet  Commonly known as: CIALIS     vitamin E 400 UNIT capsule           Objective Findings at Discharge:   BP (!) 134/59   Pulse 86   Temp 98.2 °F (36.8 °C) (Oral)   Resp 17   Ht 5' 8\" (1.727 m)   Wt 240 lb (108.9 kg)   SpO2 97%   BMI 36.49 kg/m²       Physical Exam:   Physical Exam  Vitals and nursing note reviewed. Constitutional:       General: He is not in acute distress. Appearance: Normal appearance. He is normal weight. He is not ill-appearing, toxic-appearing or diaphoretic. HENT:      Head: Normocephalic and atraumatic. Eyes:      General: No scleral icterus. Conjunctiva/sclera: Conjunctivae normal.   Cardiovascular:      Rate and Rhythm: Normal rate and regular rhythm. Heart sounds: Normal heart sounds.  No murmur heard.      Pulmonary:      Effort: Pulmonary effort is normal. No respiratory distress. Breath sounds: Normal breath sounds. No wheezing. Abdominal:      General: Bowel sounds are normal. There is no distension. Palpations: Abdomen is soft. Tenderness: There is no abdominal tenderness. There is no guarding. Musculoskeletal:         General: No deformity or signs of injury. Normal range of motion. Cervical back: Normal range of motion and neck supple. No rigidity or tenderness. Right lower leg: No edema. Left lower leg: No edema. Skin:     Coloration: Skin is not jaundiced. Neurological:      General: No focal deficit present. Mental Status: He is alert. Cranial Nerves: No cranial nerve deficit. Motor: No weakness. Labs and Imaging   XR CHEST PORTABLE    Result Date: 6/26/2022  EXAMINATION: ONE XRAY VIEW OF THE CHEST 6/26/2022 5:32 am COMPARISON: 06/24/2022 and 06/21/2022 HISTORY: ORDERING SYSTEM PROVIDED HISTORY: Hypoxia TECHNOLOGIST PROVIDED HISTORY: Reason for exam:->Hypoxia Reason for Exam: Hypoxia FINDINGS: Progressive opacification of the right hemithorax with very limited aeration in the right apex. The mediastinum is not shifted and features indicate a large pleural effusion and compressive atelectasis. However pneumonia is not excluded. The left lung is stable and has a well visualized left hemidiaphragm and costophrenic angle. Left pulmonary vasculature is stable as well without left-sided edema. No lines or tubes within the chest.     Progressive opacification of the right hemithorax without volume loss. Features indicate large pleural effusion and compressive atelectasis. However underlying pneumonia is not excluded.      XR CHEST PORTABLE    Result Date: 6/24/2022  EXAMINATION: ONE XRAY VIEW OF THE CHEST 6/24/2022 8:58 am COMPARISON: 06/21/2022 HISTORY: ORDERING SYSTEM PROVIDED HISTORY: POST RIGHT THORACENTESIS TECHNOLOGIST PROVIDED HISTORY: Reason for exam:->POST RIGHT THORACENTESIS Reason for Exam: POST RIGHT THORACENTESIS FINDINGS: Cardiomediastinal is stable. Slight decrease in volume of right pleural effusion, remains large. No pneumothorax. Left lung is clear. Slight decrease in volume of right pleural effusion, remains large. No pneumothorax. XR CHEST PORTABLE    Result Date: 6/21/2022  EXAMINATION: ONE XRAY VIEW OF THE CHEST 6/21/2022 5:11 pm COMPARISON: 06/21/2022 at 14:37 HISTORY: ORDERING SYSTEM PROVIDED HISTORY: post right thoracentesis TECHNOLOGIST PROVIDED HISTORY: Reason for exam:->post right thoracentesis Reason for Exam: post right thora FINDINGS: Decreased right pleural effusion with some aerated lung medially in the right hemithorax. Heart size stable. Left lung clear. Decreased right pleural effusion However, there is persistent near total opacification of the right hemithorax     XR CHEST PORTABLE    Result Date: 6/21/2022  EXAMINATION: ONE XRAY VIEW OF THE CHEST 6/21/2022 2:35 pm COMPARISON: 05/19/2022 HISTORY: ORDERING SYSTEM PROVIDED HISTORY: sob, hx cirrhosis and effusions TECHNOLOGIST PROVIDED HISTORY: Reason for exam:->sob, hx cirrhosis and effusions Reason for Exam: sob, hx cirrhosis and effusions Additional signs and symptoms: na Relevant Medical/Surgical History: diabetes, hypertension, alcoholism FINDINGS: Heart size stable. Right hemithorax totally opacified with shift of the mediastinal structures to the left. No focal airspace disease seen in the left lung.      Probable large right pleural effusion with shift of the mediastinal structures to the left side and associated volume loss in the right lung     IR US GUIDED PARACENTESIS    Result Date: 6/22/2022  PROCEDURE: PARACENTESIS WITHOUT IMAGE GUIDANCE US ABDOMEN LIMITED 6/22/2022 HISTORY: ORDERING SYSTEM PROVIDED HISTORY: cirrhosis, + ascites, sob, hypoxia TECHNOLOGIST PROVIDED HISTORY: Reason for exam:->cirrhosis, + ascites, sob, hypoxia advanced under ultrasound guidance into pleural effusion and thoracentesis was performed. The patient tolerated the procedure well. Postprocedure chest radiograph ordered FINDINGS: Pre and intraprocedural images demonstrate large amount of right-sided pleural fluid with the thoracentesis needle/catheter within it. A total of 2000 mL cloudy white/yellow fluid removed. 1000 mL sample sent for laboratory evaluation. No complication suggested. Successful ultrasound guided right thoracentesis with specimen sent for laboratory evaluation. CBC:   Recent Labs     06/24/22 1753 06/25/22 0358 06/26/22 0437   WBC 7.3 6.1 6.7   HGB 12.5* 12.6* 12.3*    136* 137*     BMP:    Recent Labs     06/24/22 1753 06/25/22 0358 06/26/22 0437    140 139   K 4.0 3.9 4.0   CL 97* 99 97*   CO2 36* 38* 38*   BUN 15 13 13   CREATININE 1.0 0.9 1.0   GLUCOSE 127* 112* 115*     Hepatic:   Recent Labs     06/24/22 1753 06/25/22 0358 06/26/22 0437   AST 72* 64* 57*   ALT 41* 42* 40   BILITOT 0.7 0.9 0.7   ALKPHOS 135* 144* 148*     Lipids:   Lab Results   Component Value Date    CHOL 174 05/07/2022    CHOL 230 10/14/2019    HDL 28 05/07/2022    TRIG 130 05/07/2022     Hemoglobin A1C:   Lab Results   Component Value Date    LABA1C 5.5 06/22/2022     TSH:   Lab Results   Component Value Date    TSH 1.39 10/14/2019     Troponin:   Lab Results   Component Value Date    TROPONINT 0.027 06/21/2022    TROPONINT 0.020 06/21/2022    TROPONINT 0.022 05/02/2022     Lactic Acid: No results for input(s): LACTA in the last 72 hours.   BNP:   Recent Labs     06/24/22 1753   PROBNP 2,741*     UA:  Lab Results   Component Value Date    NITRU NEGATIVE 06/22/2022    COLORU YELLOW 06/22/2022    WBCUA NONE SEEN 06/22/2022    RBCUA 1 06/22/2022    MUCUS RARE 06/22/2022    TRICHOMONAS NONE SEEN 06/22/2022    BACTERIA NEGATIVE 06/22/2022    CLARITYU CLEAR 06/22/2022    SPECGRAV 1.020 06/22/2022    LEUKOCYTESUR NEGATIVE 06/22/2022 UROBILINOGEN 1.0 06/22/2022    BILIRUBINUR NEGATIVE 06/22/2022    BLOODU TRACE 06/22/2022    KETUA NEGATIVE 06/22/2022     Urine Cultures: No results found for: LABURIN  Blood Cultures: No results found for: BC  No results found for: BLOODCULT2  Organism: No results found for: ORG    Time Spent Discharging patient 45 minutes    Electronically signed by Peter Fraire MD on 6/26/2022 at 9:56 AM

## 2022-06-26 NOTE — PROGRESS NOTES
Spoke with Jose Dubois at [facility]. Updated her on patients condition and labs. Pt has a room at [facility] bed 1004. Receiving doctor is Dr. Orlando Hyde. Report can be called to [phone].  Notified by Daniel Nicole at transport center that transport is set up for 0830

## 2022-06-27 LAB
CULTURE: ABNORMAL
GRAM SMEAR: ABNORMAL
Lab: ABNORMAL
SPECIMEN: ABNORMAL

## 2022-06-28 LAB
ALBUMIN FLUID: 1.2 GM/DL
SOURCE FLUID: NORMAL

## 2022-07-08 PROBLEM — Z09 HOSPITAL DISCHARGE FOLLOW-UP: Status: RESOLVED | Noted: 2022-06-08 | Resolved: 2022-07-08

## 2022-07-12 ENCOUNTER — OFFICE VISIT (OUTPATIENT)
Dept: FAMILY MEDICINE CLINIC | Age: 77
End: 2022-07-12
Payer: COMMERCIAL

## 2022-07-12 VITALS
BODY MASS INDEX: 34.4 KG/M2 | WEIGHT: 227 LBS | SYSTOLIC BLOOD PRESSURE: 98 MMHG | DIASTOLIC BLOOD PRESSURE: 54 MMHG | HEART RATE: 69 BPM | OXYGEN SATURATION: 90 % | HEIGHT: 68 IN

## 2022-07-12 DIAGNOSIS — K70.31 ALCOHOLIC CIRRHOSIS OF LIVER WITH ASCITES (HCC): ICD-10-CM

## 2022-07-12 DIAGNOSIS — E11.9 TYPE 2 DIABETES MELLITUS WITHOUT COMPLICATION, WITHOUT LONG-TERM CURRENT USE OF INSULIN (HCC): Primary | ICD-10-CM

## 2022-07-12 PROBLEM — E11.22 TYPE 2 DIABETES MELLITUS WITH CHRONIC KIDNEY DISEASE (HCC): Status: ACTIVE | Noted: 2022-07-12

## 2022-07-12 PROCEDURE — 2022F DILAT RTA XM EVC RTNOPTHY: CPT

## 2022-07-12 PROCEDURE — 3017F COLORECTAL CA SCREEN DOC REV: CPT

## 2022-07-12 PROCEDURE — 1036F TOBACCO NON-USER: CPT

## 2022-07-12 PROCEDURE — 3044F HG A1C LEVEL LT 7.0%: CPT

## 2022-07-12 PROCEDURE — 1124F ACP DISCUSS-NO DSCNMKR DOCD: CPT

## 2022-07-12 PROCEDURE — 1111F DSCHRG MED/CURRENT MED MERGE: CPT

## 2022-07-12 PROCEDURE — G8417 CALC BMI ABV UP PARAM F/U: HCPCS

## 2022-07-12 PROCEDURE — G8427 DOCREV CUR MEDS BY ELIG CLIN: HCPCS

## 2022-07-12 PROCEDURE — 99213 OFFICE O/P EST LOW 20 MIN: CPT

## 2022-07-12 RX ORDER — SPIRONOLACTONE 50 MG/1
50 TABLET, FILM COATED ORAL DAILY
Qty: 30 TABLET | Refills: 5 | Status: SHIPPED | OUTPATIENT
Start: 2022-07-12 | End: 2022-10-04 | Stop reason: DRUGHIGH

## 2022-07-12 RX ORDER — SPIRONOLACTONE 50 MG/1
50 TABLET, FILM COATED ORAL DAILY
COMMUNITY
End: 2022-07-12 | Stop reason: SDUPTHER

## 2022-07-12 RX ORDER — FUROSEMIDE 40 MG/1
40 TABLET ORAL DAILY
Qty: 30 TABLET | Refills: 5 | Status: ON HOLD | OUTPATIENT
Start: 2022-07-12 | End: 2022-09-14 | Stop reason: HOSPADM

## 2022-07-12 NOTE — PROGRESS NOTES
7/12/2022    Robby Gaitan    Chief Complaint   Patient presents with    1 Month Follow-Up     hosp f/u [facility] ascites, fluid lungs,     Other     per wife [facility] provider mentioned liver transplant       HPI  History was obtained from patient. Keily Shipley is a pleasant 78 y.o. male who presents today for one month follow up. Patient re-admitted to [facility] on 6/21 for similar complaints and transferred to [facility] on 6/26 for transplant evaluation. Agressive diuresis, paracentesis and thoracentesis were performed. HE improved. Thoracentesis consistent with chylothorax- pt instructed to eat a low chyle diet. Follow up with hepatology at [facility] in 4-8 weeks. Medical team at [facility] advised that pt was not a candidate for liver transplant- Liver Team however has not weighed in. Dr. Mike Hedrick is the head of the liver team at [facility]. Pt has orders for lab draw to be done tomorrow. MELD labs and chemistry per [facility] note. Pt reports that he is feeling better since being released. Confusion has improved, pt remains fatigued. SOB improved after thoracentesis. Pt has had a 3lb weight gain since Sunday, wife is concerned about ascites needing removed again soon. Patient has follow-up with gastroenterology on 7/18/2022.       1. Type 2 diabetes mellitus without complication, without long-term current use of insulin ([facility] [address]    2. Alcoholic cirrhosis of liver with ascites (HCC)             REVIEW OF SYMPTOMS    Review of Systems   Constitutional: Positive for activity change and fatigue. Negative for chills, fever and unexpected weight change. Respiratory: Negative for shortness of breath. Cardiovascular: Negative. Negative for chest pain, palpitations and leg swelling. Gastrointestinal: Positive for abdominal distention. Negative for abdominal pain, blood in stool, constipation, nausea and vomiting. Genitourinary: Negative for difficulty urinating and hematuria.    Musculoskeletal: Positive for arthralgias (Chronic hip pain).   Skin: Positive for pallor (Ashen in color). Negative for color change. Neurological: Negative for light-headedness. PAST MEDICAL HISTORY  Past Medical History:   Diagnosis Date    Anxiety     Cirrhosis, alcoholic ([facility] [address]     Diabetes mellitus ([facility] [address]     GERD (gastroesophageal reflux disease)     GERD (gastroesophageal reflux disease)     Hyperlipidemia     Hypertension     Portal hypertension (HCC)     Pulmonary nodules     Sleep apnea     SOB (shortness of breath)     Thyroid disease        FAMILY HISTORY  Family History   Problem Relation Age of Onset    Hypertension Brother     Diabetes Other        SOCIAL HISTORY  Social History     Socioeconomic History    Marital status:      Spouse name: None    Number of children: None    Years of education: None    Highest education level: None   Occupational History     Comment: Retired    Tobacco Use    Smoking status: Former Smoker     Packs/day: 1.00     Years: 14.00     Pack years: 14.00     Quit date:      Years since quittin.5    Smokeless tobacco: Never Used   Vaping Use    Vaping Use: Never used   Substance and Sexual Activity    Alcohol use: Not Currently    Drug use: Never    Sexual activity: Yes     Partners: Female   Other Topics Concern    None   Social History Narrative    None     Social Determinants of Health     Financial Resource Strain: Low Risk     Difficulty of Paying Living Expenses: Not hard at all   Food Insecurity: No Food Insecurity    Worried About Running Out of Food in the Last Year: Never true    Hector of Food in the Last Year: Never true   Transportation Needs:     Lack of Transportation (Medical): Not on file    Lack of Transportation (Non-Medical):  Not on file   Physical Activity:     Days of Exercise per Week: Not on file    Minutes of Exercise per Session: Not on file   Stress:     Feeling of Stress : Not on file   Social Connections:     Frequency of Communication with Friends and Family: Not on file    Frequency of Social Gatherings with Friends and Family: Not on file    Attends [employer] Services: Not on file    Active Member of Clubs or Organizations: Not on file    Attends Club or Organization Meetings: Not on file    Marital Status: Not on file   Intimate Partner Violence:     Fear of Current or Ex-Partner: Not on file    Emotionally Abused: Not on file    Physically Abused: Not on file    Sexually Abused: Not on file   Housing Stability:     Unable to Pay for Housing in the Last Year: Not on file    Number of [place] in the Last Year: Not on file    Unstable Housing in the Last Year: Not on file        SURGICAL HISTORY  Past Surgical History:   Procedure Laterality Date    APPENDECTOMY      CHOLECYSTECTOMY      COLONOSCOPY      ENDOSCOPY, COLON, DIAGNOSTIC      FOOT SURGERY      needle removed,not sure which foot, per wife.     TONSILLECTOMY      UPPER GASTROINTESTINAL ENDOSCOPY N/A 1/19/2022    EGD BIOPSY performed by Cathy Kapoor MD at [facility]  Current Outpatient Medications   Medication Sig Dispense Refill    furosemide (LASIX) 40 MG tablet Take 1 tablet by mouth daily 30 tablet 5    spironolactone (ALDACTONE) 50 MG tablet Take 1 tablet by mouth daily 30 tablet 5    rifAXIMin (XIFAXAN) 550 MG tablet Take 1 tablet by mouth 2 times daily 60 tablet 5    levothyroxine (SYNTHROID) 50 MCG tablet Take 1 tablet by mouth Daily 90 tablet 1    lactulose (CHRONULAC) 10 GM/15ML solution Take 30 mLs by mouth 3 times daily Hold if having diarrhea 2700 mL 5    DULoxetine (CYMBALTA) 60 MG extended release capsule TAKE 1 CAPSULE BY MOUTH ONCE DAILY 90 capsule 1    nadolol (CORGARD) 40 MG tablet Take 1 tablet by mouth daily 90 tablet 1    albuterol sulfate HFA (VENTOLIN HFA) 108 (90 Base) MCG/ACT inhaler Inhale 2 puffs into the lungs 4 times daily as needed for Wheezing 1 Inhaler 0    Multiple Vitamin (MULTI VITAMIN MENS PO) Take 1 tablet by mouth daily      ESOMEPRAZOLE MAGNESIUM PO Take 20 mg by mouth daily OTC      Omega-3 1000 MG CAPS Take 1 capsule by mouth daily       No current facility-administered medications for this visit. ALLERGIES  Allergies   Allergen Reactions    Lisinopril Swelling     Tongue swelling      Zetia [Ezetimibe] Swelling     Facial swelling    Atorvastatin     Codeine Other (See Comments)     Blood pressure drops    Hydrochlorothiazide     Hydroxyzine      Fatigue and depressed    Lexapro [Escitalopram Oxalate]      Increased depression    Pravastatin        PHYSICAL EXAM    BP (!) 98/54   Pulse 69   Ht 5' 8\" (1.727 m)   Wt 227 lb (103 kg)   SpO2 90%   BMI 34.52 kg/m²     Physical Exam  Vitals and nursing note reviewed. Constitutional:       Appearance: He is well-developed. HENT:      Head: Normocephalic and atraumatic. Eyes:      General: No scleral icterus. Extraocular Movements: Extraocular movements intact. Conjunctiva/sclera: Conjunctivae normal.   Neck:      Thyroid: No thyromegaly. Vascular: No JVD. Trachea: No tracheal deviation. Cardiovascular:      Rate and Rhythm: Normal rate and regular rhythm. Pulses: Normal pulses. Heart sounds: Normal heart sounds. Pulmonary:      Effort: Pulmonary effort is normal. No respiratory distress. Breath sounds: Normal breath sounds. No wheezing or rales. Abdominal:      General: Abdomen is protuberant. Bowel sounds are increased. There is no distension. Palpations: Abdomen is soft. Tenderness: There is no abdominal tenderness. There is no guarding or rebound. Musculoskeletal:      Cervical back: Neck supple. Lymphadenopathy:      Cervical: No cervical adenopathy. Skin:     General: Skin is warm and dry. Coloration: Skin is ashen, pale and sallow. Nails: There is no clubbing.    Neurological:      Mental Status: He is alert and oriented to person, place, and time. Mental status is at baseline. Comments: Nonfocal   Psychiatric:         Mood and Affect: Mood normal.         Behavior: Behavior is slowed. Behavior is cooperative. Thought Content: Thought content normal.         Judgment: Judgment normal.         ASSESSMENT & PLAN    Rey Orona was seen today for 1 month follow-up and other. Diagnoses and all orders for this visit:    Type 2 diabetes mellitus without complication, without long-term current use of insulin (HCC)  -     MICROALBUMIN / CREATININE URINE RATIO; Future  - Condition stable on current regimen. No changes. Alcoholic cirrhosis of liver with ascites (HCC)  -     furosemide (LASIX) 40 MG tablet; Take 1 tablet by mouth daily  -     spironolactone (ALDACTONE) 50 MG tablet; Take 1 tablet by mouth daily  -     rifAXIMin (XIFAXAN) 550 MG tablet; Take 1 tablet by mouth 2 times daily  - Follow-up with GI on 7/18/2022 as planned  - Contact information for hep otology at [facility] provided as they have not yet contacted pt for follow up appointment per note        Return if symptoms worsen or fail to improve.          Electronically signed by MOHINDER Nichole CNP on 7/12/2022

## 2022-07-12 NOTE — PATIENT INSTRUCTIONS
Michelle Pascal DO  Address: [address] [place] [address], [address] [place] [address]  Phone: [phone]

## 2022-07-13 ENCOUNTER — HOSPITAL ENCOUNTER (OUTPATIENT)
Age: 77
Discharge: HOME | End: 2022-07-13
Payer: COMMERCIAL

## 2022-07-13 LAB
CREATININE URINE: 203.9 MG/DL (ref 39–259)
MICROALBUMIN/CREAT 24H UR: 8.1 MG/DL
MICROALBUMIN/CREAT UR-RTO: 39.7 MG/G CREAT (ref 0–30)

## 2022-07-13 PROCEDURE — 82570 ASSAY OF URINE CREATININE: CPT

## 2022-07-13 PROCEDURE — 82043 UR ALBUMIN QUANTITATIVE: CPT

## 2022-07-13 PROCEDURE — 80048 BASIC METABOLIC PNL TOTAL CA: CPT

## 2022-07-14 ASSESSMENT — ENCOUNTER SYMPTOMS
COLOR CHANGE: 0
ABDOMINAL PAIN: 0
SHORTNESS OF BREATH: 0
BLOOD IN STOOL: 0
NAUSEA: 0
VOMITING: 0
CONSTIPATION: 0
ABDOMINAL DISTENTION: 1

## 2022-07-15 ENCOUNTER — HOSPITAL ENCOUNTER (OUTPATIENT)
Age: 77
Discharge: HOME | End: 2022-07-15
Payer: COMMERCIAL

## 2022-07-15 LAB
ALBUMIN SERPL-MCNC: 3.2 GM/DL (ref 3.4–5)
ALP BLD-CCNC: 218 IU/L (ref 40–129)
ALT SERPL-CCNC: 79 U/L (ref 10–40)
ANION GAP SERPL CALCULATED.3IONS-SCNC: 6 MMOL/L (ref 4–16)
APTT: 30.8 SECONDS (ref 25.1–37.1)
AST SERPL-CCNC: 101 IU/L (ref 15–37)
BILIRUB SERPL-MCNC: 0.7 MG/DL (ref 0–1)
BILIRUBIN DIRECT: 0.2 MG/DL (ref 0–0.3)
BILIRUBIN, INDIRECT: 0.5 MG/DL (ref 0–0.7)
BUN BLDV-MCNC: 30 MG/DL (ref 6–23)
CALCIUM SERPL-MCNC: 10.1 MG/DL (ref 8.3–10.6)
CHLORIDE BLD-SCNC: 92 MMOL/L (ref 99–110)
CO2: 35 MMOL/L (ref 21–32)
CREAT SERPL-MCNC: 1.4 MG/DL (ref 0.9–1.3)
GFR AFRICAN AMERICAN: 60 ML/MIN/1.73M2
GFR NON-AFRICAN AMERICAN: 50 ML/MIN/1.73M2
GLUCOSE BLD-MCNC: 110 MG/DL (ref 70–99)
INR BLD: 1.03 INDEX
POTASSIUM SERPL-SCNC: 3.9 MMOL/L (ref 3.5–5.1)
PROTHROMBIN TIME: 13.3 SECONDS (ref 11.7–14.5)
SODIUM BLD-SCNC: 133 MMOL/L (ref 135–145)
TOTAL PROTEIN: 7.7 GM/DL (ref 6.4–8.2)

## 2022-07-15 PROCEDURE — 85610 PROTHROMBIN TIME: CPT

## 2022-07-15 PROCEDURE — 80053 COMPREHEN METABOLIC PANEL: CPT

## 2022-07-15 PROCEDURE — 36415 COLL VENOUS BLD VENIPUNCTURE: CPT

## 2022-07-15 PROCEDURE — 85730 THROMBOPLASTIN TIME PARTIAL: CPT

## 2022-07-15 PROCEDURE — 82248 BILIRUBIN DIRECT: CPT

## 2022-07-18 ENCOUNTER — OFFICE VISIT (OUTPATIENT)
Dept: GASTROENTEROLOGY | Age: 77
End: 2022-07-18
Payer: COMMERCIAL

## 2022-07-18 VITALS
BODY MASS INDEX: 35.19 KG/M2 | OXYGEN SATURATION: 99 % | SYSTOLIC BLOOD PRESSURE: 126 MMHG | DIASTOLIC BLOOD PRESSURE: 74 MMHG | WEIGHT: 232.2 LBS | HEART RATE: 77 BPM | HEIGHT: 68 IN

## 2022-07-18 DIAGNOSIS — I85.10 SECONDARY ESOPHAGEAL VARICES WITHOUT BLEEDING (HCC): ICD-10-CM

## 2022-07-18 DIAGNOSIS — K74.60 CIRRHOSIS OF LIVER WITH ASCITES, UNSPECIFIED HEPATIC CIRRHOSIS TYPE (HCC): Primary | ICD-10-CM

## 2022-07-18 DIAGNOSIS — R18.8 CIRRHOSIS OF LIVER WITH ASCITES, UNSPECIFIED HEPATIC CIRRHOSIS TYPE (HCC): Primary | ICD-10-CM

## 2022-07-18 PROCEDURE — 1124F ACP DISCUSS-NO DSCNMKR DOCD: CPT | Performed by: NURSE PRACTITIONER

## 2022-07-18 PROCEDURE — 99215 OFFICE O/P EST HI 40 MIN: CPT | Performed by: NURSE PRACTITIONER

## 2022-07-18 ASSESSMENT — ENCOUNTER SYMPTOMS
NAUSEA: 0
SHORTNESS OF BREATH: 0
DIARRHEA: 0
EYE PAIN: 0
CONSTIPATION: 0
ABDOMINAL PAIN: 0
BACK PAIN: 0
BLOOD IN STOOL: 0
COUGH: 0
VOMITING: 0
EYE DISCHARGE: 0

## 2022-07-18 NOTE — PROGRESS NOTES
Jovanna Cohen 78 y.o. male was seen by SAHARA Flores on 7/18/2022     Wt Readings from Last 3 Encounters:   07/18/22 232 lb 3.2 oz (105.3 kg)   07/12/22 227 lb (103 kg)   06/26/22 240 lb (108.9 kg)       HPI  Jovanna Cohen is a pleasant 78 y.o.  male who presents today with wife [facility] for follow-up on decompensated liver cirrhosis. His last EGD was done by Dr. Miles Dugan on 1- showing medium-large esophageal varices without red marking, small hiatal hernia, polyps in stomach antrum and mild portal hypertensive gastropathy. His stomach biopsies showed benign gastric mucosa containing mild chronic inflammation with no evidence of H. Pylori infection, dysplasia or malignancy. He was transferred from [facility] on 6- to [facility] due to respiratory failure and hepatic encephalopathy from decompensated liver cirrhosis and was discharged July 4. He had a thoracentesis with removal of 2 L of fluid. AFP tumor marker 2 noted on 3-. His CT of the abdomen/pelvis done on 5-5-2022 showed:    Complete opacification of the right hemithorax with a large right pleural   effusion, with compressive atelectasis of the right lung, and shift of the   mediastinum to the left. Minor atelectasis noted in the left lung base. Large amount of ascites. Evidence of the cirrhosis. Stable left renal cyst.       Stable mesenteric and retroperitoneal lymphadenopathy without change since   2019. He had a paracentesis on 6- with 6.8 L of fluid with no evidence of SBP. He had multiple paracentesis in the last year with most recent one done at [facility] with 2,900 cc of fluid removed on 6-. He reports feeling better. His lab work done on 7- showed PT 13.3, INR 1.03. Albumin 3.2, Total Bilirubin 0.7, Alkaline Phosphatase 218, , ALT 79. He is taking Spironolactone, Xifaxan, Lactulose and Lasix. He is on low sodium <2 grams.   He denies NSAID use. No jaundice, extremity swelling, foggy brain or pruritus. His appetite is good and weight is stable. No abdominal pain. He has some bloating with no obvious ascites. No nausea or vomiting. No heartburn or acid reflux controlled with Nexium. No nocturnal awakenings with acid reflux. Occasional dysphagia. No pain with swallowing. No excess belching or flatulence. He denies changes in his bowel pattern. His typical bowel pattern is two to five times daily with soft blobs of stool. He is taking Lactulose. No constipation. No blood in his stools or melena. No family history of stomach or colon cancer. ROS  Review of Systems   Constitutional:  Negative for chills, diaphoresis and fever. HENT:  Negative for ear pain and hearing loss. Eyes:  Negative for pain and discharge. Respiratory:  Negative for cough and shortness of breath. Cardiovascular:  Negative for chest pain, palpitations and leg swelling. Gastrointestinal:  Negative for abdominal pain, blood in stool, constipation, diarrhea, nausea and vomiting. Genitourinary:  Negative for dysuria, frequency, hematuria and urgency. Musculoskeletal:  Negative for back pain, myalgias and neck pain. Skin:  Negative for rash. Allergic/Immunologic: Negative for environmental allergies. Neurological:  Negative for dizziness, seizures, weakness and headaches. Hematological:  Does not bruise/bleed easily. Psychiatric/Behavioral:  The patient is not nervous/anxious.       Allergies  Allergies   Allergen Reactions    Lisinopril Swelling     Tongue swelling      Zetia [Ezetimibe] Swelling     Facial swelling    Atorvastatin     Codeine Other (See Comments)     Blood pressure drops    Hydrochlorothiazide     Hydroxyzine      Fatigue and depressed    Lexapro [Escitalopram Oxalate]      Increased depression    Pravastatin        Medications  Current Outpatient Medications   Medication Sig Dispense Refill    furosemide (LASIX) 40 MG tablet Take 1 tablet by mouth daily 30 tablet 5    spironolactone (ALDACTONE) 50 MG tablet Take 1 tablet by mouth daily 30 tablet 5    rifAXIMin (XIFAXAN) 550 MG tablet Take 1 tablet by mouth 2 times daily 60 tablet 5    levothyroxine (SYNTHROID) 50 MCG tablet Take 1 tablet by mouth Daily 90 tablet 1    lactulose (CHRONULAC) 10 GM/15ML solution Take 30 mLs by mouth 3 times daily Hold if having diarrhea 2700 mL 5    DULoxetine (CYMBALTA) 60 MG extended release capsule TAKE 1 CAPSULE BY MOUTH ONCE DAILY 90 capsule 1    albuterol sulfate HFA (VENTOLIN HFA) 108 (90 Base) MCG/ACT inhaler Inhale 2 puffs into the lungs 4 times daily as needed for Wheezing 1 Inhaler 0    Multiple Vitamin (MULTI VITAMIN MENS PO) Take 1 tablet by mouth daily      ESOMEPRAZOLE MAGNESIUM PO Take 20 mg by mouth daily OTC      Omega-3 1000 MG CAPS Take 1 capsule by mouth daily      nadolol (CORGARD) 40 MG tablet Take 1 tablet by mouth daily 90 tablet 1     No current facility-administered medications for this visit. Past medical history:   He has a past medical history of Anxiety, Cirrhosis, alcoholic ([facility] [address] Diabetes mellitus ([facility] [address] GERD (gastroesophageal reflux disease), GERD (gastroesophageal reflux disease), Hyperlipidemia, Hypertension, Portal hypertension ([facility] [address] Pulmonary nodules, Sleep apnea, SOB (shortness of breath), and Thyroid disease. Past surgical history:  He has a past surgical history that includes Cholecystectomy; Vasectomy; Colonoscopy; Endoscopy, colon, diagnostic; Foot surgery; Upper gastrointestinal endoscopy (N/A, 1/19/2022); Tonsillectomy; and Appendectomy. Social History:  He reports that he quit smoking about 46 years ago. He has a 14.00 pack-year smoking history. He has never used smokeless tobacco. He reports that he does not currently use alcohol. He reports that he does not use drugs.     Family history:  His family history includes Diabetes in an other family member; Hypertension in his Final    Anion Gap 07/15/2022 6  4 - 16 Final    BUN 07/15/2022 30 (A) 6 - 23 MG/DL Final    CREATININE 07/15/2022 1.4 (A) 0.9 - 1.3 MG/DL Final    Glucose 07/15/2022 110 (A) 70 - 99 MG/DL Final    Calcium 07/15/2022 10.1  8.3 - 10.6 MG/DL Final    GFR Non- 07/15/2022 50 (A) >60 mL/min/1.73m2 Final    GFR  07/15/2022 60 (A) >60 mL/min/1.73m2 Final    Albumin 07/15/2022 3.2 (A) 3.4 - 5.0 GM/DL Final    Total Bilirubin 07/15/2022 0.7  0.0 - 1.0 MG/DL Final    Bilirubin, Direct 07/15/2022 0.2  0.0 - 0.3 MG/DL Final    Bilirubin, Indirect 07/15/2022 0.5  0 - 0.7 MG/DL Final    Alkaline Phosphatase 07/15/2022 218 (A) 40 - 129 IU/L Final    AST 07/15/2022 101 (A) 15 - 37 IU/L Final    ALT 07/15/2022 79 (A) 10 - 40 U/L Final    Total Protein 07/15/2022 7.7  6.4 - 8.2 GM/DL Final    Protime 07/15/2022 13.3  11.7 - 14.5 SECONDS Final    Comment: Protime seconds can vary  due to reagent sensitivity. Please use INR to monitor  oral anticoagulants. INR 07/15/2022 1.03  INDEX Final    Comment: THERAPEUTIC RANGE:  INDICATIONS: INR 2.0-3.0  Most (DVT, PE,  Atrial Fibillation, Bioprosthetic valve,   [place] bicuspid aortic valve)  INDICATIONS: 2.5-3.5 Most mechanical valves  recurrent thrombosis. aPTT 07/15/2022 30.8  25.1 - 37.1 SECONDS Final    Comment: EFFECTIVE 07/07/2017  THERAPEUTIC RANGE FOR ROUTINE  HEPARIN USE IS 52.0-85.0 SECONDS. Hospital Outpatient Visit on 07/13/2022   Component Date Value Ref Range Status    Microalbumin Creatinine Ratio 07/13/2022 39.7 (A) 0 - 30 MG/G CREAT Final    Creatinine, Ur 07/13/2022 203.9  39 - 259 MG/DL Final    Microalb, Ur 07/13/2022 8.1 (A) <2.0 mg/dl Final   No results displayed because visit has over 200 results.       Orders Only on 06/20/2022   Component Date Value Ref Range Status    Sodium 06/20/2022 141  136 - 145 mmol/L Final    Potassium 06/20/2022 4.8  3.5 - 5.1 mmol/L Final    Chloride 06/20/2022 97 (A) 99 - 110 mmol/L Final 05/19/2022 7.5   Final    Specimen 05/19/2022 PLEURAL FLUID   Final    Special Requests 05/19/2022 RT   Final    Gram Smear 05/19/2022    Final                    Value:RARE  NECROTIC POLYS      Gram Smear 05/19/2022    Final                    Value:MANY  RED BLOOD CELLS      Gram Smear 05/19/2022 CONCENTRATED CYTOSPIN PREP (A)  Final    Gram Smear 05/19/2022 NO ORGANISMS SEEN   Final    Culture 05/19/2022 Final Report No growth 60 to 72 hours   Final    Glucose, Fluid 05/19/2022 125  >60 MG/DL Final    0 TO 40 MG/DL LESS THAN PLASMA VALUE    LD, Fluid 05/19/2022 80  <200 IU/L Final    Protein, Fluid 05/19/2022 2.8  G/DL Final    Comment: PLEURAL FLUID           >3.0 = EXUDATE           <3.0 = TRANSUDATE          PERITONEAL FLUID           >2.0 = EXUDATE           <2.0 = TRANSUDATE              Fluid Type 05/19/2022 PLEURAL  INDEX Final    RIGHT    RBC, Fluid 05/19/2022 15,000  /CU MM Final    WBC, Fluid 05/19/2022 546  /CU MM Final    Neutrophil Count, Fluid 05/19/2022 10  % Final    Lymphocytes, Body Fluid 05/19/2022 53  % Final    Monocyte Count, Fluid 05/19/2022 37  % Final    Mesothelial, Fluid 05/19/2022 5  /100 WBC Final    Other Cells, Fluid 05/19/2022 0   Final    POC Glucose 05/19/2022 110 (A) 70 - 99 MG/DL Final    POC Glucose 05/19/2022 141 (A) 70 - 99 MG/DL Final    POC Glucose 05/20/2022 113 (A) 70 - 99 MG/DL Final    POC Glucose 05/20/2022 126 (A) 70 - 99 MG/DL Final    POC Glucose 05/20/2022 110 (A) 70 - 99 MG/DL Final    Total Protein 05/20/2022 7.3  6.3 - 8.2 g/dL Final    Albumin 05/20/2022 2.96 (A) 3.75 - 5.01 g/dL Final    Alpha-1-Globulin 05/20/2022 0.35  0.19 - 0.46 g/dL Final    Alpha-2-Globulin 05/20/2022 0.74  0.48 - 1.05 g/dL Final    Beta Globulin 05/20/2022 1.10  0.48 - 1.10 g/dL Final    Gamma 05/20/2022 2.15 (A) 0.62 - 1.51 g/dL Final    Immunofixation Reflex 05/20/2022 AUDREY Done   Final    SPE/AUDREY Interpretation 05/20/2022 See Note   Final    Comment: (NOTE)  M-spike in the gamma region. The monoclonal protein peak  accounts for 0.82 g/dL of the total 2.15 g/dL of protein in  the gamma region. AUDREY gel pattern shows an IgM type lambda  monoclonal protein. Protein Electrophoresis, Serum 05/20/2022 See Note   Final    Comment: (NOTE)  Authorized individuals can access the [facility]   Enhanced Report using the following link:     [email or web address]  Performed By: [facility]  [address] [place] [address]  : Haley Fonseca. Natalia Maurice MD      [place] Qnt Free Light Chains 05/20/2022 129.09 (A) 3.30 - 19.40 mg/L Final    Comment: (NOTE)  INTERPRETIVE INFORMATION: Kappa Qnt Free Light Chains  Undetected antigen excess is a rare event but cannot be excluded. Free light chain results should always be interpreted in   conjunction with other clinical and laboratory findings. Lambda Free Light Chains QNT 05/20/2022 89.84 (A) 5.71 - 26.30 mg/L Final    Comment: (NOTE)  INTERPRETIVE INFORMATION: Lambda Qnt Free Light Chains  Undetected antigen excess is a rare event but cannot be excluded. Free light chain results should always be interpreted in   conjunction with other clinical and laboratory findings. Kappa/Lambda Fluid C Ratio 05/20/2022 1.44  0.26 - 1.65 Final    Comment: (NOTE)  Performed By: [facility]  [address] [place] [address]  : Haley Fonseca. Natalia Maurice MD      Beta-2 Microglobulin 05/20/2022 5.6 (A) <=3.0 mg/L Final    Comment: (NOTE)  Performed By: [facility]  [address] [place] [address]  : Haley Fonseca.  Natalia Maurice MD      LD 05/20/2022 191  120 - 246 IU/L Final    WBC 05/20/2022 10.9 (A) 4.0 - 10.5 K/CU MM Final    RBC 05/20/2022 4.06 (A) 4.6 - 6.2 M/CU MM Final    Hemoglobin 05/20/2022 13.0 (A) 13.5 - 18.0 GM/DL Final    Hematocrit 05/20/2022 40.7 (A) 42 - 52 % Final    MCV 05/20/2022 100.2 (A) 78 - 100 FL Final    MCH 05/20/2022 32.0 (A) 27 - 31 PG Final MCHC 05/20/2022 31.9 (A) 32.0 - 36.0 % Final    RDW 05/20/2022 14.6  11.7 - 14.9 % Final    Platelets 82/06/3479 264  140 - 440 K/CU MM Final    MPV 05/20/2022 10.9  7.5 - 11.1 FL Final    Differential Type 05/20/2022 AUTOMATED DIFFERENTIAL   Final    Segs Relative 05/20/2022 73.3 (A) 36 - 66 % Final    Lymphocytes % 05/20/2022 12.0 (A) 24 - 44 % Final    Monocytes % 05/20/2022 7.9 (A) 0 - 4 % Final    Eosinophils % 05/20/2022 5.6 (A) 0 - 3 % Final    Basophils % 05/20/2022 0.7  0 - 1 % Final    Segs Absolute 05/20/2022 8.0  K/CU MM Final    Lymphocytes Absolute 05/20/2022 1.3  K/CU MM Final    Monocytes Absolute 05/20/2022 0.9  K/CU MM Final    Eosinophils Absolute 05/20/2022 0.6  K/CU MM Final    Basophils Absolute 05/20/2022 0.1  K/CU MM Final    Nucleated RBC % 05/20/2022 0.0  % Final    Total Nucleated RBC 05/20/2022 0.0  K/CU MM Final    Total Immature Neutrophil 05/20/2022 0.06  K/CU MM Final    Immature Neutrophil % 05/20/2022 0.5 (A) 0 - 0.43 % Final    Sodium 05/20/2022 134 (A) 135 - 145 MMOL/L Final    Potassium 05/20/2022 4.7  3.5 - 5.1 MMOL/L Final    Chloride 05/20/2022 96 (A) 99 - 110 mMol/L Final    CO2 05/20/2022 32  21 - 32 MMOL/L Final    BUN 05/20/2022 30 (A) 6 - 23 MG/DL Final    CREATININE 05/20/2022 1.6 (A) 0.9 - 1.3 MG/DL Final    Glucose 05/20/2022 114 (A) 70 - 99 MG/DL Final    Calcium 05/20/2022 10.4  8.3 - 10.6 MG/DL Final    Albumin 05/20/2022 3.0 (A) 3.4 - 5.0 GM/DL Final    Total Protein 05/20/2022 7.4  6.4 - 8.2 GM/DL Final    Total Bilirubin 05/20/2022 0.6  0.0 - 1.0 MG/DL Final    ALT 05/20/2022 35  10 - 40 U/L Final    AST 05/20/2022 41 (A) 15 - 37 IU/L Final    Alkaline Phosphatase 05/20/2022 136 (A) 40 - 129 IU/L Final    GFR Non- 05/20/2022 42 (A) >60 mL/min/1.73m2 Final    GFR  05/20/2022 51 (A) >60 mL/min/1.73m2 Final    Anion Gap 05/20/2022 6  4 - 16 Final    IgG, Serum 05/20/2022 2,163 (A) 723 - 1,685 MG/DL Final    IgA 05/20/2022 668 (A) 69 - 382 MG/DL Final    IgM,Serum 05/20/2022 393 (A) 62 - 277 MG/DL Final    POC Glucose 05/20/2022 111 (A) 70 - 99 MG/DL Final    POC Glucose 05/20/2022 105 (A) 70 - 99 MG/DL Final    POC Glucose 05/21/2022 113 (A) 70 - 99 MG/DL Final    POC Glucose 05/21/2022 114 (A) 70 - 99 MG/DL Final    POC Glucose 05/21/2022 111 (A) 70 - 99 MG/DL Final    POC Glucose 05/21/2022 103 (A) 70 - 99 MG/DL Final    POC Glucose 05/21/2022 107 (A) 70 - 99 MG/DL Final    POC Glucose 05/22/2022 91  70 - 99 MG/DL Final    POC Glucose 05/22/2022 94  70 - 99 MG/DL Final    Sed Rate 05/22/2022 28 (A) 0 - 20 MM/HR Final    POC Glucose 05/22/2022 109 (A) 70 - 99 MG/DL Final    POC Glucose 05/22/2022 163 (A) 70 - 99 MG/DL Final    POC Glucose 05/22/2022 120 (A) 70 - 99 MG/DL Final    POC Glucose 05/23/2022 158 (A) 70 - 99 MG/DL Final    Immunofix Electrophoresis Gel 05/20/2022 See Note   Final    Comment: (NOTE)  AUDREY gel pattern shows an IgM type lambda monoclonal  protein. INTERPRETIVE INFORMATION: Immunofix Electrophoresis, Serum  This information should be correlated with the results of serum   protein electrophoresis, quantitative immunoglobulins and other   clinical and laboratory information. EER Immunofix Electrophoresis Gel 05/20/2022 See Note   Final    Comment: (NOTE)  Authorized individuals can access the [facility]   Enhanced Report using the following link:     [email or web address]  Performed By: [facility]  [address], [address] [facility]  : Michael Deluca.  Dave Starks MD      POC Glucose 05/23/2022 106 (A) 70 - 99 MG/DL Final    POC Glucose 05/23/2022 129 (A) 70 - 99 MG/DL Final    POC Glucose 05/23/2022 105 (A) 70 - 99 MG/DL Final    Specimen 05/23/2022 URINE CLEAN CATCH   Final    Special Requests 05/23/2022 NONE   Final    Culture 05/23/2022 Final Report No growth at 18 to 36 hours   Final    Color, UA 05/23/2022 YELLOW  YELLOW Final    Clarity, UA 05/23/2022 CLEAR  CLEAR Final Glucose, Urine 05/23/2022 NEGATIVE  NEGATIVE MG/DL Final    Bilirubin Urine 05/23/2022 NEGATIVE  NEGATIVE MG/DL Final    Ketones, Urine 05/23/2022 NEGATIVE  NEGATIVE MG/DL Final    Specific Gravity, UA 05/23/2022 1.020  1.001 - 1.035 Final    Blood, Urine 05/23/2022 MODERATE (A) NEGATIVE Final    pH, Urine 05/23/2022 5.0  5.0 - 8.0 Final    Protein, UA 05/23/2022 NEGATIVE  NEGATIVE MG/DL Final    Urobilinogen, Urine 05/23/2022 0.2  0.2 - 1.0 MG/DL Final    Nitrite Urine, Quantitative 05/23/2022 NEGATIVE  NEGATIVE Final    Leukocyte Esterase, Urine 05/23/2022 TRACE  NEGATIVE Final    RBC, UA 05/23/2022 4 (A) 0 - 3 /HPF Final    WBC, UA 05/23/2022 2  0 - 2 /HPF Final    Bacteria, UA 05/23/2022 RARE (A) NEGATIVE /HPF Final    Mucus, UA 05/23/2022 RARE (A) NEGATIVE HPF Final    Trichomonas, UA 05/23/2022 NONE SEEN  NONE SEEN /HPF Final    Hyaline Casts, UA 05/23/2022 0  /LPF Final    WBC 05/23/2022 9.5  4.0 - 10.5 K/CU MM Final    RBC 05/23/2022 4.15 (A) 4.6 - 6.2 M/CU MM Final    Hemoglobin 05/23/2022 13.3 (A) 13.5 - 18.0 GM/DL Final    Hematocrit 05/23/2022 41.1 (A) 42 - 52 % Final    MCV 05/23/2022 99.0  78 - 100 FL Final    MCH 05/23/2022 32.0 (A) 27 - 31 PG Final    MCHC 05/23/2022 32.4  32.0 - 36.0 % Final    RDW 05/23/2022 15.0 (A) 11.7 - 14.9 % Final    Platelets 54/47/1109 280  140 - 440 K/CU MM Final    MPV 05/23/2022 10.9  7.5 - 11.1 FL Final    Sodium 05/23/2022 136  135 - 145 MMOL/L Final    Potassium 05/23/2022 5.1  3.5 - 5.1 MMOL/L Final    Chloride 05/23/2022 98 (A) 99 - 110 mMol/L Final    CO2 05/23/2022 30  21 - 32 MMOL/L Final    BUN 05/23/2022 31 (A) 6 - 23 MG/DL Final    CREATININE 05/23/2022 1.5 (A) 0.9 - 1.3 MG/DL Final    Glucose 05/23/2022 124 (A) 70 - 99 MG/DL Final    Calcium 05/23/2022 10.2  8.3 - 10.6 MG/DL Final    Albumin 05/23/2022 2.9 (A) 3.4 - 5.0 GM/DL Final    Total Protein 05/23/2022 7.1  6.4 - 8.2 GM/DL Final    Total Bilirubin 05/23/2022 0.6  0.0 - 1.0 MG/DL Final    ALT 05/23/2022 32  10 - 40 U/L Final    AST 05/23/2022 41 (A) 15 - 37 IU/L Final    Alkaline Phosphatase 05/23/2022 128  40 - 129 IU/L Final    GFR Non- 05/23/2022 46 (A) >60 mL/min/1.73m2 Final    GFR  05/23/2022 55 (A) >60 mL/min/1.73m2 Final    Anion Gap 05/23/2022 8  4 - 16 Final    Ammonia 05/23/2022 34  16 - 60 UMOL/L Final    POC Glucose 05/23/2022 117 (A) 70 - 99 MG/DL Final    POC Glucose 05/24/2022 98  70 - 99 MG/DL Final    POC Glucose 05/24/2022 99  70 - 99 MG/DL Final    POC Glucose 05/24/2022 127 (A) 70 - 99 MG/DL Final    POC Glucose 05/24/2022 109 (A) 70 - 99 MG/DL Final    POC Glucose 05/24/2022 146 (A) 70 - 99 MG/DL Final    POC Glucose 05/24/2022 100 (A) 70 - 99 MG/DL Final    POC Glucose 05/25/2022 102 (A) 70 - 99 MG/DL Final    POC Glucose 05/25/2022 110 (A) 70 - 99 MG/DL Final    POC Glucose 05/25/2022 111 (A) 70 - 99 MG/DL Final    POC Glucose 05/25/2022 117 (A) 70 - 99 MG/DL Final    POC Glucose 05/25/2022 162 (A) 70 - 99 MG/DL Final    POC Glucose 05/26/2022 113 (A) 70 - 99 MG/DL Final    POC Glucose 05/26/2022 98  70 - 99 MG/DL Final    POC Glucose 05/26/2022 114 (A) 70 - 99 MG/DL Final    WBC 05/27/2022 7.9  4.0 - 10.5 K/CU MM Final    RBC 05/27/2022 4.11 (A) 4.6 - 6.2 M/CU MM Final    Hemoglobin 05/27/2022 13.2 (A) 13.5 - 18.0 GM/DL Final    Hematocrit 05/27/2022 41.1 (A) 42 - 52 % Final    MCV 05/27/2022 100.0  78 - 100 FL Final    MCH 05/27/2022 32.1 (A) 27 - 31 PG Final    MCHC 05/27/2022 32.1  32.0 - 36.0 % Final    RDW 05/27/2022 15.1 (A) 11.7 - 14.9 % Final    Platelets 06/84/7504 220  140 - 440 K/CU MM Final    MPV 05/27/2022 11.0  7.5 - 11.1 FL Final    Sodium 05/27/2022 137  135 - 145 MMOL/L Final    Potassium 05/27/2022 5.3 (A) 3.5 - 5.1 MMOL/L Final    Chloride 05/27/2022 97 (A) 99 - 110 mMol/L Final    CO2 05/27/2022 32  21 - 32 MMOL/L Final    Anion Gap 05/27/2022 8  4 - 16 Final    BUN 05/27/2022 31 (A) 6 - 23 MG/DL Final    CREATININE 05/27/2022 1.5 (A) 0.9 - 1.3 MG/DL Final    Glucose 05/27/2022 107 (A) 70 - 99 MG/DL Final    Calcium 05/27/2022 10.4  8.3 - 10.6 MG/DL Final    GFR Non- 05/27/2022 46 (A) >60 mL/min/1.73m2 Final    GFR  05/27/2022 55 (A) >60 mL/min/1.73m2 Final       Assessment:  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 Mortimer Primer on 1- showing medium-large esophageal varices without red marking, small hiatal hernia, polyps in stomach antrum and mild portal hypertensive gastropathy. Plan:   Will order abdominal ultrasound and AFP tumor marker  Continue taking Lasix, Aldactone and Lactulose  Continue taking Nadolol for treatment of portal hypertension  Continue to avoid NSAID's and eat low sodium diet <2 grams; do not excess 2 grams of Tylenol daily  Instructed to follow with Dr. Mortimer Primer and call if any problems    Total time:  40 minutes- reviewing chart, labs, imaging, etc.

## 2022-07-20 RX ORDER — NADOLOL 40 MG/1
40 TABLET ORAL DAILY
Qty: 90 TABLET | Refills: 3 | Status: SHIPPED | OUTPATIENT
Start: 2022-07-20 | End: 2022-10-26

## 2022-07-21 ENCOUNTER — TELEPHONE (OUTPATIENT)
Dept: FAMILY MEDICINE CLINIC | Age: 77
End: 2022-07-21

## 2022-07-21 NOTE — TELEPHONE ENCOUNTER
Imani from [address] [facility] called and stated Patient Being D/C from PT today 7-21-22.  Patient did not want to continue with PT.

## 2022-07-25 NOTE — TELEPHONE ENCOUNTER
Sure patient is using his diuretics as prescribed. Patient has massive ascites and portal hypertension that puts pressure on his diaphragms. Be sure he uses O2 if he has it at home as needed and uses his albuterol inhaler if needed. We will cautiously treat him with prednisone 10 mg  3 times daily for 5 days.

## 2022-07-26 RX ORDER — ALBUTEROL SULFATE 90 UG/1
2 AEROSOL, METERED RESPIRATORY (INHALATION) 4 TIMES DAILY PRN
Qty: 1 EACH | Refills: 0 | Status: SHIPPED | OUTPATIENT
Start: 2022-07-26 | End: 2022-10-13

## 2022-07-26 RX ORDER — PREDNISONE 10 MG/1
10 TABLET ORAL 3 TIMES DAILY
Qty: 15 TABLET | Refills: 0 | Status: SHIPPED | OUTPATIENT
Start: 2022-07-26 | End: 2022-07-31

## 2022-07-26 NOTE — TELEPHONE ENCOUNTER
Spoke with the patients wife per Dr. Claudio serrano. Patients wife agreed and voiced understanding. Medication pended per Dr. Elva Dykes.  Neetu Natarajan

## 2022-07-28 ENCOUNTER — TELEPHONE (OUTPATIENT)
Dept: GASTROENTEROLOGY | Age: 77
End: 2022-07-28

## 2022-07-28 DIAGNOSIS — R18.8 OTHER ASCITES: Primary | ICD-10-CM

## 2022-07-28 NOTE — TELEPHONE ENCOUNTER
Patient called in requesting a parasenthesis to be ordered for him. \"Im pretty swollen\" I advised patients wife that Dr. Baron Olivo is out of town but I will try messaging him.

## 2022-08-05 ENCOUNTER — HOSPITAL ENCOUNTER (OUTPATIENT)
Dept: INTERVENTIONAL RADIOLOGY/VASCULAR | Age: 77
Discharge: HOME | End: 2022-08-05
Payer: COMMERCIAL

## 2022-08-05 VITALS
HEART RATE: 71 BPM | SYSTOLIC BLOOD PRESSURE: 156 MMHG | BODY MASS INDEX: 35.77 KG/M2 | RESPIRATION RATE: 22 BRPM | OXYGEN SATURATION: 100 % | WEIGHT: 236 LBS | DIASTOLIC BLOOD PRESSURE: 71 MMHG | HEIGHT: 68 IN | TEMPERATURE: 97.5 F

## 2022-08-05 DIAGNOSIS — R18.8 OTHER ASCITES: ICD-10-CM

## 2022-08-05 LAB
FLUID TYPE: NORMAL INDEX
HCT VFR BLD CALC: 43 % (ref 42–52)
HEMOGLOBIN: 13.8 GM/DL (ref 13.5–18)
LYMPHOCYTES, BODY FLUID: 42 %
MCH RBC QN AUTO: 31.7 PG (ref 27–31)
MCHC RBC AUTO-ENTMCNC: 32.1 % (ref 32–36)
MCV RBC AUTO: 98.9 FL (ref 78–100)
MESOTHELIAL FLUID: 0 /100 WBC
MONOCYTE, FLUID: 49 %
NEUTROPHIL, FLUID: 9 %
PDW BLD-RTO: 15 % (ref 11.7–14.9)
PLATELET # BLD: 173 K/CU MM (ref 140–440)
PMV BLD AUTO: 11 FL (ref 7.5–11.1)
RBC # BLD: 4.35 M/CU MM (ref 4.6–6.2)
RBC FLUID: 130 /CU MM
WBC # BLD: 9.4 K/CU MM (ref 4–10.5)
WBC FLUID: 218 /CU MM

## 2022-08-05 PROCEDURE — 87070 CULTURE OTHR SPECIMN AEROBIC: CPT

## 2022-08-05 PROCEDURE — P9047 ALBUMIN (HUMAN), 25%, 50ML: HCPCS

## 2022-08-05 PROCEDURE — 7100000010 HC PHASE II RECOVERY - FIRST 15 MIN

## 2022-08-05 PROCEDURE — 7100000011 HC PHASE II RECOVERY - ADDTL 15 MIN

## 2022-08-05 PROCEDURE — 89051 BODY FLUID CELL COUNT: CPT

## 2022-08-05 PROCEDURE — 49083 ABD PARACENTESIS W/IMAGING: CPT

## 2022-08-05 PROCEDURE — 6360000002 HC RX W HCPCS

## 2022-08-05 PROCEDURE — 85027 COMPLETE CBC AUTOMATED: CPT

## 2022-08-05 PROCEDURE — 87205 SMEAR GRAM STAIN: CPT

## 2022-08-05 RX ORDER — SODIUM CHLORIDE 0.9 % (FLUSH) 0.9 %
10 SYRINGE (ML) INJECTION ONCE
Status: COMPLETED | OUTPATIENT
Start: 2022-08-05 | End: 2022-08-05

## 2022-08-05 RX ORDER — ALBUMIN (HUMAN) 12.5 G/50ML
37.5 SOLUTION INTRAVENOUS ONCE
Status: DISCONTINUED | OUTPATIENT
Start: 2022-08-05 | End: 2022-08-06 | Stop reason: HOSPADM

## 2022-08-05 RX ADMIN — Medication 10 ML: at 07:18

## 2022-08-05 ASSESSMENT — PAIN - FUNCTIONAL ASSESSMENT
PAIN_FUNCTIONAL_ASSESSMENT: 0-10
PAIN_FUNCTIONAL_ASSESSMENT: 0-10

## 2022-08-05 NOTE — DISCHARGE INSTRUCTIONS
[facility]    Patient Discharge Instructions Abdominal Fluid Drainage      You have had had an Abdominal Fluid Drainage in the Radiology Department. Below are guidelines for you to follow after your test is completed:    Go home and rest quietly for the remainder of the day. DO NOT drive, operate appliances, or make any legal decisions today. You may resume normal activities tomorrow. Have a responsible adult drive you home and remain with you for the remainder of the day. You may resume your normal diet after the procedure. Avoid alcoholic beverages and depressant drugs for 24 hours. You may resume your previous medications unless directed not to by your physician or the Radiologist. Juanetta Boards may use Tylenol (one or two tablets every four to six hours) for discomfort at the puncture site. If you develop severe pain, swelling, or redness at the site, call the Radiology Department. Call your physician immediately if you develop a rapid pulse (greater than 100 beats per minute), feel faint, sweat profusely, experience a sudden onset of weakness, or experience increased pain or swelling or saturation of dressing at the puncture site. Call your physician immediately if you develop a sudden onset of rapid breathing (greater than 20 breaths per minute), upper back or chest pain, shortness of breath, sweating, skin color change, or a sudden onset of anxiety. Report a temperature greater than 101 degrees Fahrenheit (38.8 degrees Celsius) to us or to your physician. Check the dressing throughout the day for an increase in drainage. Keep the dressing dry for 24 hours. Replace with a Band-Aid if necessary. You may shower 24 hours after the procedure. Do not smoke for 24 hours after the procedure. Call your physician for a follow-up appointment.   If any questions or complications develop after you go home, please call the Radiology Department at [phone]

## 2022-08-05 NOTE — OR NURSING
INFORMED CONSENT:  Obtained prior to procedure by Dr. Yenny Aquino. Consent placed in chart. ASSESSMENT: Patient alert and oriented and aware of procedure to be done. Patient is on 3L O2 via NC. No distress noted. BARRIER PRECAUTIONS & STERILE TECHNIQUE:               Pt remains on SDS cart. Pt placed on Vital Signs Monitor. Pt prepped and draped in a sterile fashion with chlorhexadine. PAIN/LOCAL ANESTHESIA/SEDATION MANAGEMENT:           Local: Lidocaine 1% given by Dr. De La O Dears:           Mandeep Pemberton: 8319 with Safe-T-Centesis          US/FLUORO: US guided with 4 images    0853--Cloudy Milky fluid returns    0814--total of 6600cc of milky cloudy fluid            STERILE DRESSINGS: gauze and tegaderm    SPECIMENS: Anerobic and Aerobic cultures obtained and sent to lab along with 1000cc of cloudy milky colored fluid via vacuum bottle.     EBL:      Less than 1 cc    STAFF IN ROOM: Dr. Alba Evangelista RN:          REPORT CALLED TO: [facility] SARANYA LAGUNAS

## 2022-08-05 NOTE — PROGRESS NOTES
7215- Patient returned to [facility], Report given to this nurse from [facility]. Patient A&O able to make needs known. Patient may discharge. 3929- IV removed patient up to side of bed getting dressed discharge instructions given to patient and his spouse both verbalized understanding. 2781- Patient and spouse escorted to elevator.

## 2022-08-08 ENCOUNTER — TELEPHONE (OUTPATIENT)
Dept: FAMILY MEDICINE CLINIC | Age: 77
End: 2022-08-08

## 2022-08-08 NOTE — TELEPHONE ENCOUNTER
George Larios  nurse from [address] called and stated that patient had a Paracentesis 8-5-22 by Dr Marcus Campos. Patient is not taking medications when he should. George Larios advised patient to take the medication in the AM and between 2-3:00 in the afternoon. Patient is using rescue inhaler 5 to 6 times per day. George Larios reviewed usage of this medication. Patient not wearing his O2, O2 was 88/90  Patient not wearing his C-Pap at night states its to much on his face.

## 2022-08-09 ENCOUNTER — TELEPHONE (OUTPATIENT)
Dept: PULMONOLOGY | Age: 77
End: 2022-08-09

## 2022-08-09 ENCOUNTER — TELEPHONE (OUTPATIENT)
Dept: GASTROENTEROLOGY | Age: 77
End: 2022-08-09

## 2022-08-09 ENCOUNTER — HOSPITAL ENCOUNTER (OUTPATIENT)
Dept: ULTRASOUND IMAGING | Age: 77
Discharge: HOME | End: 2022-08-09
Payer: COMMERCIAL

## 2022-08-09 DIAGNOSIS — R18.8 CIRRHOSIS OF LIVER WITH ASCITES, UNSPECIFIED HEPATIC CIRRHOSIS TYPE (HCC): ICD-10-CM

## 2022-08-09 DIAGNOSIS — K74.60 CIRRHOSIS OF LIVER WITH ASCITES, UNSPECIFIED HEPATIC CIRRHOSIS TYPE (HCC): ICD-10-CM

## 2022-08-09 PROCEDURE — 76705 ECHO EXAM OF ABDOMEN: CPT

## 2022-08-09 NOTE — TELEPHONE ENCOUNTER
Dr Malvin Melo office contacted pt to let them know a R pleural effusion. He does have a follow up appt with you 8/15.

## 2022-08-09 NOTE — TELEPHONE ENCOUNTER
Patient and wife Antonette Pastor called with Abdominal US results IMPRESSION:  1. Hepatic cirrhosis. 2. No focal liver lesions identified. 3. Right pleural effusion and moderate ascites within the right upper  Quadrant  He had Paracentesis done on 8-5-2022  Limited ultrasound of the abdomen demonstrates ascites with the paracentesis   catheter within it. A total of 6600 cc cloudy fluid was removed. Specimen   sent for laboratory evaluation. 37.5 g intravenous albumin utilized for   procedure. Patient was called with results; he reports having more shortness of breath; not sleeping well, no fevers or chills he was instructed to see a pulmonologist and has an appointment with Dr. April Guy on August 15.  for right pleural effusion. His wife Antonette Pastor wanted me to notify Dr. Saad Melara that she wanted to know if it would be necessary to have an order for scheduled paracentesis for her  since this has become a concern for her due to ascites build up. Please have Dr. Saad Melara call her at his convenience to discuss if he thinks this would be necessary.

## 2022-08-12 LAB
CULTURE: NORMAL
GRAM SMEAR: NORMAL
Lab: NORMAL
SPECIMEN: NORMAL

## 2022-08-15 ENCOUNTER — HOSPITAL ENCOUNTER (OUTPATIENT)
Age: 77
Discharge: HOME | End: 2022-08-15
Payer: COMMERCIAL

## 2022-08-15 ENCOUNTER — HOSPITAL ENCOUNTER (OUTPATIENT)
Dept: GENERAL RADIOLOGY | Age: 77
Discharge: HOME | End: 2022-08-15
Payer: COMMERCIAL

## 2022-08-15 ENCOUNTER — OFFICE VISIT (OUTPATIENT)
Dept: PULMONOLOGY | Age: 77
End: 2022-08-15
Payer: COMMERCIAL

## 2022-08-15 VITALS
HEIGHT: 68 IN | BODY MASS INDEX: 36.65 KG/M2 | HEART RATE: 83 BPM | DIASTOLIC BLOOD PRESSURE: 64 MMHG | WEIGHT: 241.8 LBS | SYSTOLIC BLOOD PRESSURE: 120 MMHG | OXYGEN SATURATION: 86 %

## 2022-08-15 DIAGNOSIS — Z87.891 EX-SMOKER: ICD-10-CM

## 2022-08-15 DIAGNOSIS — J90 RECURRENT RIGHT PLEURAL EFFUSION: ICD-10-CM

## 2022-08-15 DIAGNOSIS — E66.9 OBESITY (BMI 30-39.9): ICD-10-CM

## 2022-08-15 DIAGNOSIS — R06.02 SOB (SHORTNESS OF BREATH): ICD-10-CM

## 2022-08-15 DIAGNOSIS — G47.10 HYPERSOMNIA: ICD-10-CM

## 2022-08-15 DIAGNOSIS — G47.33 OSA (OBSTRUCTIVE SLEEP APNEA): ICD-10-CM

## 2022-08-15 DIAGNOSIS — E66.01 SEVERE OBESITY (BMI 35.0-39.9) WITH COMORBIDITY (HCC): ICD-10-CM

## 2022-08-15 PROCEDURE — 99214 OFFICE O/P EST MOD 30 MIN: CPT | Performed by: INTERNAL MEDICINE

## 2022-08-15 PROCEDURE — 71046 X-RAY EXAM CHEST 2 VIEWS: CPT

## 2022-08-15 RX ORDER — LACTULOSE 10 G/15ML
20 SOLUTION ORAL 3 TIMES DAILY
COMMUNITY
Start: 2022-04-26 | End: 2022-08-15

## 2022-08-15 ASSESSMENT — SLEEP AND FATIGUE QUESTIONNAIRES
HOW LIKELY ARE YOU TO NOD OFF OR FALL ASLEEP WHILE SITTING AND TALKING TO SOMEONE: 0
HOW LIKELY ARE YOU TO NOD OFF OR FALL ASLEEP WHILE SITTING QUIETLY AFTER LUNCH WITHOUT ALCOHOL: 2
ESS TOTAL SCORE: 13
HOW LIKELY ARE YOU TO NOD OFF OR FALL ASLEEP WHEN YOU ARE A PASSENGER IN A CAR FOR AN HOUR WITHOUT A BREAK: 2
HOW LIKELY ARE YOU TO NOD OFF OR FALL ASLEEP IN A CAR, WHILE STOPPED FOR A FEW MINUTES IN TRAFFIC: 0
NECK CIRCUMFERENCE (INCHES): 16.5
HOW LIKELY ARE YOU TO NOD OFF OR FALL ASLEEP WHILE LYING DOWN TO REST IN THE AFTERNOON WHEN CIRCUMSTANCES PERMIT: 3
HOW LIKELY ARE YOU TO NOD OFF OR FALL ASLEEP WHILE SITTING AND READING: 2
HOW LIKELY ARE YOU TO NOD OFF OR FALL ASLEEP WHILE WATCHING TV: 3
HOW LIKELY ARE YOU TO NOD OFF OR FALL ASLEEP WHILE SITTING INACTIVE IN A PUBLIC PLACE: 1

## 2022-08-15 ASSESSMENT — ENCOUNTER SYMPTOMS
ABDOMINAL DISTENTION: 1
SHORTNESS OF BREATH: 1
ABDOMINAL PAIN: 0
EYE DISCHARGE: 0
EYE ITCHING: 0
COUGH: 0

## 2022-08-15 NOTE — PROGRESS NOTES
swelling. Negative for chest pain. Gastrointestinal:  Positive for abdominal distention. Negative for abdominal pain. Endocrine: Negative for cold intolerance and heat intolerance. Genitourinary:  Negative for enuresis and frequency. Musculoskeletal:  Negative for arthralgias. Allergic/Immunologic: Negative for environmental allergies. Neurological:  Negative for light-headedness and headaches. Hematological:  Negative for adenopathy. Psychiatric/Behavioral:  Negative for agitation and behavioral problems. Objective:   /64   Pulse 83   Ht 5' 8\" (1.727 m)   Wt 241 lb 12.8 oz (109.7 kg)   SpO2 (!) 86%   BMI 36.77 kg/m²   Body mass index is 36.77 kg/m². Sleep Medicine 8/15/2022 7/17/2019   Sitting and reading 2 3   Watching TV 3 2   Sitting, inactive in a public place (e.g. a theatre or a meeting) 1 2   As a passenger in a car for an hour without a break 2 3   Lying down to rest in the afternoon when circumstances permit 3 3   Sitting and talking to someone 0 0   Sitting quietly after a lunch without alcohol 2 3   In a car, while stopped for a few minutes in traffic 0 0   Total score 13 16   Neck circumference (Inches) 16.5 18.25     Mallampati 3    Physical Exam  Vitals reviewed. Constitutional:       Appearance: Normal appearance. HENT:      Head: Normocephalic and atraumatic. Nose: Nose normal.      Mouth/Throat:      Mouth: Mucous membranes are moist.   Eyes:      Extraocular Movements: Extraocular movements intact. Pupils: Pupils are equal, round, and reactive to light. Cardiovascular:      Rate and Rhythm: Normal rate and regular rhythm. Pulses: Normal pulses. Heart sounds: Normal heart sounds. Pulmonary:      Effort: Pulmonary effort is normal.      Comments: Decreased air entry bilateral bases  Abdominal:      General: Abdomen is flat. Palpations: Abdomen is soft. Musculoskeletal:         General: Normal range of motion.       Cervical back: Normal range of motion and neck supple. Skin:     General: Skin is warm and dry. Neurological:      General: No focal deficit present. Mental Status: He is alert and oriented to person, place, and time. Psychiatric:         Mood and Affect: Mood normal.         Behavior: Behavior normal.       Radiology: None    Assessment and Plan     Problem List    None           Follow-Up:    No follow-ups on file.      Progress notes sent to the referring Provider    Wyatt Eisenmenger, MD MD  8/15/2022  11:20 AM

## 2022-08-16 ENCOUNTER — TELEPHONE (OUTPATIENT)
Dept: PULMONOLOGY | Age: 77
End: 2022-08-16

## 2022-08-16 DIAGNOSIS — J90 RECURRENT RIGHT PLEURAL EFFUSION: Primary | ICD-10-CM

## 2022-08-16 NOTE — TELEPHONE ENCOUNTER
Spoke to pts wife Delores President regarding thoracentesis.  It is scheduled for Thursday August 18th at 12:30pm with an arrival time of 10:30am.

## 2022-08-17 NOTE — PROGRESS NOTES
IR Procedure at [facility]:  Spoke with patient's wife Preston Mccracken  and patient will arrive at [phone] at [facility] on 8/18/2022 for his procedure at 1230. Also went over below instructions. (Paracentesis, Thoracentesis, Thyroid Bx and Injections may have a light breakfast)  2. Follow your directions as prescribed by the doctor for your procedure and medications. 3.   Consult your provider as to when to stop blood thinner  4. Do not take any pain medication within 6 hours of your procedure  5. Do not drink any alcoholic beverages or use any street drugs 24 hours before procedure. 6.   Please wear simple, loose fitting clothing to the hospital.  Do not bring valuables (money,             credit cards, checkbooks, etc.)     7. If you  have a Living Will and Durable Power of  for Healthcare, please bring in a copy. 8.   Please bring picture ID,  insurance card, paperwork from the doctors office            (H & P, Consent,  & card for implantable devices). 9.   Report to the information desk on the ground floor. 10. Take a shower the night before or morning of your procedure, do not apply any lotion, oil or powder. 11. If you are going to be sedated for the procedure, you will need a responsible adult to drive you home.

## 2022-08-18 ENCOUNTER — HOSPITAL ENCOUNTER (OUTPATIENT)
Dept: GENERAL RADIOLOGY | Age: 77
Discharge: HOME | End: 2022-08-18
Payer: COMMERCIAL

## 2022-08-18 ENCOUNTER — HOSPITAL ENCOUNTER (OUTPATIENT)
Dept: INTERVENTIONAL RADIOLOGY/VASCULAR | Age: 77
Discharge: HOME | End: 2022-08-18
Payer: COMMERCIAL

## 2022-08-18 VITALS
RESPIRATION RATE: 16 BRPM | HEART RATE: 75 BPM | WEIGHT: 246 LBS | BODY MASS INDEX: 37.28 KG/M2 | HEIGHT: 68 IN | SYSTOLIC BLOOD PRESSURE: 149 MMHG | DIASTOLIC BLOOD PRESSURE: 85 MMHG | OXYGEN SATURATION: 99 % | TEMPERATURE: 98.2 F

## 2022-08-18 DIAGNOSIS — J90 RECURRENT RIGHT PLEURAL EFFUSION: ICD-10-CM

## 2022-08-18 LAB
APTT: 29.8 SECONDS (ref 25.1–37.1)
INR BLD: 1.02 INDEX
LACTATE DEHYDROGENASE, FLUID: 46 IU/L
PROTHROMBIN TIME: 13.1 SECONDS (ref 11.7–14.5)

## 2022-08-18 PROCEDURE — 6360000002 HC RX W HCPCS

## 2022-08-18 PROCEDURE — 7100000010 HC PHASE II RECOVERY - FIRST 15 MIN

## 2022-08-18 PROCEDURE — 88305 TISSUE EXAM BY PATHOLOGIST: CPT | Performed by: PATHOLOGY

## 2022-08-18 PROCEDURE — 83615 LACTATE (LD) (LDH) ENZYME: CPT

## 2022-08-18 PROCEDURE — 85730 THROMBOPLASTIN TIME PARTIAL: CPT

## 2022-08-18 PROCEDURE — 71045 X-RAY EXAM CHEST 1 VIEW: CPT

## 2022-08-18 PROCEDURE — 7100000011 HC PHASE II RECOVERY - ADDTL 15 MIN

## 2022-08-18 PROCEDURE — 32555 ASPIRATE PLEURA W/ IMAGING: CPT

## 2022-08-18 PROCEDURE — 88108 CYTOPATH CONCENTRATE TECH: CPT | Performed by: PATHOLOGY

## 2022-08-18 PROCEDURE — 85610 PROTHROMBIN TIME: CPT

## 2022-08-18 PROCEDURE — 87205 SMEAR GRAM STAIN: CPT

## 2022-08-18 PROCEDURE — 87070 CULTURE OTHR SPECIMN AEROBIC: CPT

## 2022-08-18 PROCEDURE — 32555 ASPIRATE PLEURA W/ IMAGING: CPT | Performed by: INTERNAL MEDICINE

## 2022-08-18 RX ORDER — SODIUM CHLORIDE 0.9 % (FLUSH) 0.9 %
10 SYRINGE (ML) INJECTION PRN
Status: DISCONTINUED | OUTPATIENT
Start: 2022-08-18 | End: 2022-08-19 | Stop reason: HOSPADM

## 2022-08-18 RX ADMIN — Medication 10 ML: at 11:16

## 2022-08-18 ASSESSMENT — PAIN - FUNCTIONAL ASSESSMENT: PAIN_FUNCTIONAL_ASSESSMENT: NONE - DENIES PAIN

## 2022-08-18 ASSESSMENT — PAIN SCALES - GENERAL: PAINLEVEL_OUTOF10: 0

## 2022-08-18 NOTE — DISCHARGE INSTRUCTIONS
Thoracentesis: What to Expect at Home  Your Recovery    Thoracentesis (say \"dytm-ep-lix-EVA-sis\") is a procedure to remove fluid from the space between the lungs and the chest wall (pleural space). This procedure may also be called a \"chest tap. \" It is normal to have a small amount of fluid in the pleural space. But too much fluid can build up because of problems such as infection, heart failure, or lung cancer. The procedure may have been done to help with shortness of breath and pain caused by the fluid buildup. Or you may have had this procedure so the doctor could test the fluid to find the cause of the buildup. Your chest may be sore where the doctor put the needle or catheter into your skin (the puncture site). This usually gets better after a day or two. You can go back to work or your normal activities as soon as you feel up to it. If the doctor sent the fluid to a lab for testing, it may take several days to get the results. The doctor or nurse will discuss the results with you. This care sheet gives you a general idea about how long it will take for you to recover. But each person recovers at a different pace. Follow the steps below to feel better as quickly as possible. How can you care for yourself at home? Activity    Rest when you feel tired. Getting enough sleep will help you recover. Avoid strenuous activities, such as bicycle riding, jogging, weight lifting, or aerobic exercise, until your doctor says it is okay. You may shower. Do not take a bath until the puncture site has healed, or until your doctor tells you it is okay. Ask your doctor when you can drive again. You may need to take 1 or 2 days off from work. It depends on the type of work you do and how you feel. Diet    You can eat your normal diet. Drink plenty of fluids (unless your doctor tells you not to). Medicines    Take pain medicines exactly as directed.   If the doctor gave you a prescription medicine for pain, take it as prescribed. If you are not taking a prescription pain medicine, ask your doctor if you can take an over-the-counter medicine. Do not take two or more pain medicines at the same time unless the doctor told you to. Many pain medicines have acetaminophen, which is Tylenol. Too much acetaminophen (Tylenol) can be harmful. If you think your pain medicine is making you sick to your stomach: Take your medicine after meals (unless your doctor has told you not to). Ask your doctor for a different pain medicine. If your doctor prescribed antibiotics, take them as directed. Do not stop taking them just because you feel better. You need to take the full course of antibiotics. Care of the puncture site    Wash the area daily with warm, soapy water, and pat it dry. Don't use hydrogen peroxide or alcohol, which may delay healing. You may cover the area with a gauze bandage if it weeps or rubs against clothing. Change the bandage every day. Keep the area clean and dry. Follow-up care is a key part of your treatment and safety  Be sure to make and go to all appointments, and call your doctor if you are having problems. Its also a good idea to know your test results and keep a list of the medicines you take. When should you call for help? Call 911 anytime you think you may need emergency care. For example, call if:  You passed out (lost consciousness). You have severe trouble breathing. You have sudden chest pain and shortness of breath, or you cough up blood. Call your doctor now or seek immediate medical care if:    You have shortness of breath that is new or getting worse. You have new or worse pain in your chest, especially when you take a deep breath. You are sick to your stomach or cannot keep fluids down. You have a fever over 100°F.  Bright red blood has soaked through the bandage over your puncture site. You have signs of infection, such as: Increased pain, swelling, warmth, or redness.   Red streaks leading from the puncture site. Pus draining from the puncture site. Swollen lymph nodes in your neck, armpits, or groin. A fever. You cough up a lot more mucus than normal, or your mucus changes color. Watch closely for changes in your health, and be sure to contact your doctor if you have any problems. Where can you learn more? Go to https://chpepiceweb.Credport. org and sign in to your [employer] account. Enter M984 in the UpSpring box to learn more about Thoracentesis: What to Expect at Home.     If you do not have an account, please click on the Sign Up Now link. © 0554-2470 Healthwise, EpicPledge. Care instructions adapted under license by [facility]. This care instruction is for use with your licensed healthcare professional. If you have questions about a medical condition or this instruction, always ask your healthcare professional. [address] any warranty or liability for your use of this information. Content Version: 81.0.314879; Last Revised: February 19, 2013   Thoracentesis: What to Expect at Home  Your Recovery    Thoracentesis (say \"uvoi-xm-etk-EVA-sis\") is a procedure to remove fluid from the space between the lungs and the chest wall (pleural space). This procedure may also be called a \"chest tap. \" It is normal to have a small amount of fluid in the pleural space. But too much fluid can build up because of problems such as infection, heart failure, or lung cancer. The procedure may have been done to help with shortness of breath and pain caused by the fluid buildup. Or you may have had this procedure so the doctor could test the fluid to find the cause of the buildup. Your chest may be sore where the doctor put the needle or catheter into your skin (the puncture site). This usually gets better after a day or two. You can go back to work or your normal activities as soon as you feel up to it.   If the doctor sent the fluid to a lab for testing, it may take several days to get the results. The doctor or nurse will discuss the results with you. This care sheet gives you a general idea about how long it will take for you to recover. But each person recovers at a different pace. Follow the steps below to feel better as quickly as possible. How can you care for yourself at home? Activity    Rest when you feel tired. Getting enough sleep will help you recover. Avoid strenuous activities, such as bicycle riding, jogging, weight lifting, or aerobic exercise, until your doctor says it is okay. You may shower. Do not take a bath until the puncture site has healed, or until your doctor tells you it is okay. Ask your doctor when you can drive again. You may need to take 1 or 2 days off from work. It depends on the type of work you do and how you feel. Diet    You can eat your normal diet. Drink plenty of fluids (unless your doctor tells you not to). Medicines    Take pain medicines exactly as directed. If the doctor gave you a prescription medicine for pain, take it as prescribed. If you are not taking a prescription pain medicine, ask your doctor if you can take an over-the-counter medicine. Do not take two or more pain medicines at the same time unless the doctor told you to. Many pain medicines have acetaminophen, which is Tylenol. Too much acetaminophen (Tylenol) can be harmful. If you think your pain medicine is making you sick to your stomach: Take your medicine after meals (unless your doctor has told you not to). Ask your doctor for a different pain medicine. If your doctor prescribed antibiotics, take them as directed. Do not stop taking them just because you feel better. You need to take the full course of antibiotics. Care of the puncture site    Wash the area daily with warm, soapy water, and pat it dry. Don't use hydrogen peroxide or alcohol, which may delay healing.  You may cover the area with a gauze bandage if it weeps or rubs against clothing. Change the bandage every day. Keep the area clean and dry. Follow-up care is a key part of your treatment and safety  Be sure to make and go to all appointments, and call your doctor if you are having problems. Its also a good idea to know your test results and keep a list of the medicines you take. When should you call for help? Call 911 anytime you think you may need emergency care. For example, call if:  You passed out (lost consciousness). You have severe trouble breathing. You have sudden chest pain and shortness of breath, or you cough up blood. Call your doctor now or seek immediate medical care if:    You have shortness of breath that is new or getting worse. You have new or worse pain in your chest, especially when you take a deep breath. You are sick to your stomach or cannot keep fluids down. You have a fever over 100°F.  Bright red blood has soaked through the bandage over your puncture site. You have signs of infection, such as: Increased pain, swelling, warmth, or redness. Red streaks leading from the puncture site. Pus draining from the puncture site. Swollen lymph nodes in your neck, armpits, or groin. A fever. You cough up a lot more mucus than normal, or your mucus changes color. Watch closely for changes in your health, and be sure to contact your doctor if you have any problems. Where can you learn more? Go to https://Morningside Analyticsshaniaeb.Fitness Partners. org and sign in to your [employer] account. Enter JEANNE in the VideoIQ box to learn more about Thoracentesis: What to Expect at Home.     If you do not have an account, please click on the Sign Up Now link. © 6169-1078 Healthwise, Incorporated. Care instructions adapted under license by [facility].  This care instruction is for use with your licensed healthcare professional. If you have questions about a medical condition or this instruction, always ask your healthcare professional. [place] [address] any warranty or liability for your use of this information. Content Version: 81.4.839947;  Last Revised: February 19, 2013

## 2022-08-18 NOTE — OR NURSING
PROCEDURE PERFORMED: right thoracentesis    PRIMARY INDICATION FOR PROCEDURE: right pleural effusion    INFORMED CONSENT:  Obtained prior to procedure. Consent placed in chart. JOVITA SCORE PRE PROCEDURE:  9      PT TRANSPORTED FROM:   \[facility]\""                                 TO THE IR ROOM:  Small                     ASSESSMENT: Pt alert & oriented on 2LNC. Very Elem    BARRIER PRECAUTIONS & STERILE TECHNIQUE:               Pt positioned on left side for comfort. Warm blankets given. Pt placed on Cardiac Monitor. Pt prepped and draped in a sterile fashion with chlorhexadine.     PAIN/LOCAL ANESTHESIA/SEDATION MANAGEMENT:           Local: Lidocaine 1% given by Dr. Katia Villanuevas:           ACCESS TIME: 9401          US/FLUORO: US 5 images          CATHETER USED: OneStep         STERILE DRESSINGS: guaze & tegaderm    SPECIMENS: 2000cc cloudy yellow pleural fluid removed; 650cc sent to lab    EBL:   <1cc       FOLLOW- UP X-RAY: Stat ordered    COMPLICATIONS/ OUTCOME: Pt tolerated well    JOVITA SCORE POST PROCEDURE:  9        REPORT CALLED TO: Kavon Haywood \[facility]\""

## 2022-08-18 NOTE — PROGRESS NOTES
P/S sent to Dr. Ryan Mead to read CXR, per Dr. Ryan Mead from his stand point he can be dischagred, Patient and his wife are upset that CXR results take so long and stated they are leaving.

## 2022-08-18 NOTE — PROGRESS NOTES
Pt returned to room from   IR  Report received from [facility] Nikia Stoner, call light placed within reach, side rails up x's 2, bandaid to rt posterior chest intact  1335 CXR done  1411 no xray results called [place] [facility]ology    Discharge instructions given, voiced understanding    Pt escorted to main entrance via wheelchair for discharge.

## 2022-08-21 LAB
CULTURE: NORMAL
Lab: NORMAL
SPECIMEN: NORMAL

## 2022-08-22 ENCOUNTER — TELEPHONE (OUTPATIENT)
Dept: GASTROENTEROLOGY | Age: 77
End: 2022-08-22

## 2022-08-22 DIAGNOSIS — R18.8 OTHER ASCITES: Primary | ICD-10-CM

## 2022-08-24 NOTE — PROGRESS NOTES
IR Procedure at [facility]:  Spoke with patient's wife Zahraa Dumont and patient will arrive at 0730 at [facility] on 8/26/2022 for his procedure at 0930. Also went over below instructions. (Paracentesis, Thoracentesis, Thyroid Bx and Injections may have a light breakfast)  2. Follow your directions as prescribed by the doctor for your procedure and medications. 3.   Consult your provider as to when to stop blood thinner  4. Do not take any pain medication within 6 hours of your procedure  5. Do not drink any alcoholic beverages or use any street drugs 24 hours before procedure. 6.   Please wear simple, loose fitting clothing to the hospital.  Do not bring valuables (money,             credit cards, checkbooks, etc.)     7. If you  have a Living Will and Durable Power of  for Healthcare, please bring in a copy. 8.   Please bring picture ID,  insurance card, paperwork from the doctors office            (H & P, Consent,  & card for implantable devices). 9.   Report to the information desk on the ground floor. 10. Take a shower the night before or morning of your procedure, do not apply any lotion, oil or powder. 11. If you are going to be sedated for the procedure, you will need a responsible adult to drive you home.

## 2022-08-26 ENCOUNTER — HOSPITAL ENCOUNTER (OUTPATIENT)
Dept: INTERVENTIONAL RADIOLOGY/VASCULAR | Age: 77
Discharge: HOME | End: 2022-08-26
Payer: COMMERCIAL

## 2022-08-26 VITALS
RESPIRATION RATE: 18 BRPM | HEART RATE: 62 BPM | OXYGEN SATURATION: 99 % | SYSTOLIC BLOOD PRESSURE: 124 MMHG | DIASTOLIC BLOOD PRESSURE: 61 MMHG | TEMPERATURE: 98 F

## 2022-08-26 DIAGNOSIS — R18.8 OTHER ASCITES: ICD-10-CM

## 2022-08-26 LAB
FLUID TYPE: NORMAL INDEX
LYMPHOCYTES, BODY FLUID: 51 %
MESOTHELIAL FLUID: 0 /100 WBC
MONOCYTE, FLUID: 33 %
NEUTROPHIL, FLUID: 14 %
OTHER CELLS FLUID: NORMAL
RBC FLUID: 56 /CU MM
WBC FLUID: 241 /CU MM

## 2022-08-26 PROCEDURE — P9047 ALBUMIN (HUMAN), 25%, 50ML: HCPCS

## 2022-08-26 PROCEDURE — 89051 BODY FLUID CELL COUNT: CPT

## 2022-08-26 PROCEDURE — 87070 CULTURE OTHR SPECIMN AEROBIC: CPT

## 2022-08-26 PROCEDURE — 87075 CULTR BACTERIA EXCEPT BLOOD: CPT

## 2022-08-26 PROCEDURE — 7100000010 HC PHASE II RECOVERY - FIRST 15 MIN

## 2022-08-26 PROCEDURE — 6360000002 HC RX W HCPCS

## 2022-08-26 PROCEDURE — 87205 SMEAR GRAM STAIN: CPT

## 2022-08-26 PROCEDURE — 49083 ABD PARACENTESIS W/IMAGING: CPT

## 2022-08-26 RX ORDER — ALBUMIN (HUMAN) 12.5 G/50ML
37.5 SOLUTION INTRAVENOUS ONCE
Status: COMPLETED | OUTPATIENT
Start: 2022-08-26 | End: 2022-08-26

## 2022-08-26 RX ADMIN — ALBUMIN (HUMAN) 37.5 G: 12.5 SOLUTION INTRAVENOUS at 09:11

## 2022-08-26 ASSESSMENT — PAIN - FUNCTIONAL ASSESSMENT: PAIN_FUNCTIONAL_ASSESSMENT: 0-10

## 2022-08-26 ASSESSMENT — PAIN SCALES - GENERAL: PAINLEVEL_OUTOF10: 0

## 2022-08-26 NOTE — PROGRESS NOTES
6989- Patient returned to [facility], report given to this nurse from [facility]. Patient may discharge. Patient is A&O able to make needs known. Beverage of choice offered to patient, IV removed. Call light in reach bed in lowest position. 2495- Discharge instructions given to patient and his spouse both verbalized understanding, patient up to side of bed getting dressed assisted by spouse. 7231- Patient escorted to elevator with spouse.

## 2022-08-26 NOTE — DISCHARGE INSTRUCTIONS
[facility]    Patient Discharge Instructions Abdominal Fluid Drainage      You have had had an Abdominal Fluid Drainage in the Radiology Department. Below are guidelines for you to follow after your test is completed:    Go home and rest quietly for the remainder of the day. DO NOT drive, operate appliances, or make any legal decisions today. You may resume normal activities tomorrow. Have a responsible adult drive you home and remain with you for the remainder of the day. You may resume your normal diet after the procedure. Avoid alcoholic beverages and depressant drugs for 24 hours. You may resume your previous medications unless directed not to by your physician or the Radiologist. [facility] may use Tylenol (one or two tablets every four to six hours) for discomfort at the puncture site. If you develop severe pain, swelling, or redness at the site, call the Radiology Department. Call your physician immediately if you develop a rapid pulse (greater than 100 beats per minute), feel faint, sweat profusely, experience a sudden onset of weakness, or experience increased pain or swelling or saturation of dressing at the puncture site. Call your physician immediately if you develop a sudden onset of rapid breathing (greater than 20 breaths per minute), upper back or chest pain, shortness of breath, sweating, skin color change, or a sudden onset of anxiety. Report a temperature greater than 101 degrees Fahrenheit (38.8 degrees Celsius) to us or to your physician. Check the dressing throughout the day for an increase in drainage. Keep the dressing dry for 24 hours. Replace with a Band-Aid if necessary. You may shower 24 hours after the procedure. Do not smoke for 24 hours after the procedure. Call your physician for a follow-up appointment.   If any questions or complications develop after you go home, please call the Radiology Department at [phone]

## 2022-08-26 NOTE — OR NURSING
PROCEDURE PREFORMED: PARACENTESIS                    PAIN/LOCAL ANESTHESIA MANAGEMENT:           Local: Lidocaine 1% given by Dr Hermes James:           ACCESS TIME: 5565          US/FLUORO: 3US Images          CATHETER USED: SafeT-Centesis    SPECIMENS: 1020cc cloudy white ascitic fluid sent to lab, 7170cc total ascitic fluid     REPORT CALLED TO:Karina LAGUNAS none

## 2022-08-31 LAB
CULTURE: ABNORMAL
GRAM SMEAR: ABNORMAL
Lab: ABNORMAL
SPECIMEN: ABNORMAL

## 2022-09-01 ENCOUNTER — HOSPITAL ENCOUNTER (OUTPATIENT)
Dept: SLEEP CENTER | Age: 77
Discharge: HOME | End: 2022-09-01
Payer: COMMERCIAL

## 2022-09-01 VITALS — HEIGHT: 68 IN | BODY MASS INDEX: 36.53 KG/M2 | WEIGHT: 241 LBS

## 2022-09-01 DIAGNOSIS — G47.33 OSA (OBSTRUCTIVE SLEEP APNEA): ICD-10-CM

## 2022-09-01 PROCEDURE — 95810 POLYSOM 6/> YRS 4/> PARAM: CPT

## 2022-09-01 ASSESSMENT — SLEEP AND FATIGUE QUESTIONNAIRES
HOW LIKELY ARE YOU TO NOD OFF OR FALL ASLEEP WHILE SITTING INACTIVE IN A PUBLIC PLACE: 2
ESS TOTAL SCORE: 14
HOW LIKELY ARE YOU TO NOD OFF OR FALL ASLEEP WHEN YOU ARE A PASSENGER IN A CAR FOR AN HOUR WITHOUT A BREAK: 2
HOW LIKELY ARE YOU TO NOD OFF OR FALL ASLEEP WHILE LYING DOWN TO REST IN THE AFTERNOON WHEN CIRCUMSTANCES PERMIT: 3
HOW LIKELY ARE YOU TO NOD OFF OR FALL ASLEEP IN A CAR, WHILE STOPPED FOR A FEW MINUTES IN TRAFFIC: 0
HOW LIKELY ARE YOU TO NOD OFF OR FALL ASLEEP WHILE SITTING AND TALKING TO SOMEONE: 1
HOW LIKELY ARE YOU TO NOD OFF OR FALL ASLEEP WHILE SITTING AND READING: 2
HOW LIKELY ARE YOU TO NOD OFF OR FALL ASLEEP WHILE SITTING QUIETLY AFTER LUNCH WITHOUT ALCOHOL: 2
HOW LIKELY ARE YOU TO NOD OFF OR FALL ASLEEP WHILE WATCHING TV: 2
NECK CIRCUMFERENCE (INCHES): 16.5

## 2022-09-02 NOTE — PROGRESS NOTES
9/2/2022  sleep study  for Juanjo Hair  1945 is complete. Results are pending physician review.     Electronically signed by Keisha Ac on 9/2/2022 at 5:20 AM

## 2022-09-07 ENCOUNTER — TELEPHONE (OUTPATIENT)
Dept: FAMILY MEDICINE CLINIC | Age: 77
End: 2022-09-07

## 2022-09-07 LAB — STATUS: NORMAL

## 2022-09-07 PROCEDURE — 95810 POLYSOM 6/> YRS 4/> PARAM: CPT | Performed by: INTERNAL MEDICINE

## 2022-09-07 NOTE — TELEPHONE ENCOUNTER
Home Care Reporting to Dr. Yodit Galeana--    Patient went from 220 Lbs, yesterday, to 226 lbs today------he feels nauseated today. Abdomen is very tight and much larger.

## 2022-09-07 NOTE — TELEPHONE ENCOUNTER
Spoke to [facility] per Message:   Home Care Reporting to Dr. Shaina De La Paz--   Patient went from 220 Lbs, yesterday, to 226 lbs today------he feels nauseated today. Abdomen is very tight and much larger. Aspen Stated patient is SOB with minimal exertion. Lungs sound ok.

## 2022-09-08 NOTE — TELEPHONE ENCOUNTER
Spoke with wife, Bijan Lindquist per Dr. Ana Zheng note: This patient has recurrent peritoneal fluid accumulations often requiring  a peritoneal tap if he is becoming symptomatic again then patient or his wife needs to contact his GI physician or go to the emergency room. I believe his wife is aware of that. Wife voiced understanding.

## 2022-09-09 ENCOUNTER — APPOINTMENT (OUTPATIENT)
Dept: GENERAL RADIOLOGY | Age: 77
DRG: 433 | End: 2022-09-09
Payer: MEDICARE

## 2022-09-09 ENCOUNTER — HOSPITAL ENCOUNTER (INPATIENT)
Age: 77
LOS: 5 days | Discharge: HOME HEALTH | DRG: 433 | End: 2022-09-14
Attending: EMERGENCY MEDICINE | Admitting: INTERNAL MEDICINE
Payer: MEDICARE

## 2022-09-09 DIAGNOSIS — J90 RECURRENT PLEURAL EFFUSION ON RIGHT: Primary | ICD-10-CM

## 2022-09-09 DIAGNOSIS — N17.9 AKI (ACUTE KIDNEY INJURY) (HCC): ICD-10-CM

## 2022-09-09 DIAGNOSIS — K70.31 ASCITES DUE TO ALCOHOLIC CIRRHOSIS (HCC): ICD-10-CM

## 2022-09-09 DIAGNOSIS — R06.89 DYSPNEA AND RESPIRATORY ABNORMALITIES: ICD-10-CM

## 2022-09-09 DIAGNOSIS — R06.00 DYSPNEA AND RESPIRATORY ABNORMALITIES: ICD-10-CM

## 2022-09-09 LAB
ALBUMIN SERPL-MCNC: 2.8 GM/DL (ref 3.4–5)
ALP BLD-CCNC: 107 IU/L (ref 40–129)
ALT SERPL-CCNC: 39 U/L (ref 10–40)
ANION GAP SERPL CALCULATED.3IONS-SCNC: 9 MMOL/L (ref 4–16)
AST SERPL-CCNC: 51 IU/L (ref 15–37)
BASOPHILS ABSOLUTE: 0.1 K/CU MM
BASOPHILS RELATIVE PERCENT: 0.6 % (ref 0–1)
BILIRUB SERPL-MCNC: 0.7 MG/DL (ref 0–1)
BUN BLDV-MCNC: 43 MG/DL (ref 6–23)
CALCIUM SERPL-MCNC: 9.9 MG/DL (ref 8.3–10.6)
CHLORIDE BLD-SCNC: 96 MMOL/L (ref 99–110)
CO2: 31 MMOL/L (ref 21–32)
CREAT SERPL-MCNC: 2 MG/DL (ref 0.9–1.3)
DIFFERENTIAL TYPE: ABNORMAL
EOSINOPHILS ABSOLUTE: 0.3 K/CU MM
EOSINOPHILS RELATIVE PERCENT: 3.2 % (ref 0–3)
GFR AFRICAN AMERICAN: 40 ML/MIN/1.73M2
GFR NON-AFRICAN AMERICAN: 33 ML/MIN/1.73M2
GLUCOSE BLD-MCNC: 99 MG/DL (ref 70–99)
HCT VFR BLD CALC: 40.1 % (ref 42–52)
HEMOGLOBIN: 13.1 GM/DL (ref 13.5–18)
IMMATURE NEUTROPHIL %: 0.1 % (ref 0–0.43)
LIPASE: 48 IU/L (ref 13–60)
LYMPHOCYTES ABSOLUTE: 0.9 K/CU MM
LYMPHOCYTES RELATIVE PERCENT: 12 % (ref 24–44)
MAGNESIUM: 2.1 MG/DL (ref 1.8–2.4)
MCH RBC QN AUTO: 31.7 PG (ref 27–31)
MCHC RBC AUTO-ENTMCNC: 32.7 % (ref 32–36)
MCV RBC AUTO: 97.1 FL (ref 78–100)
MONOCYTES ABSOLUTE: 0.9 K/CU MM
MONOCYTES RELATIVE PERCENT: 11.8 % (ref 0–4)
NUCLEATED RBC %: 0 %
PDW BLD-RTO: 15.1 % (ref 11.7–14.9)
PLATELET # BLD: 194 K/CU MM (ref 140–440)
PMV BLD AUTO: 10.4 FL (ref 7.5–11.1)
POTASSIUM SERPL-SCNC: 4.6 MMOL/L (ref 3.5–5.1)
PRO-BNP: 266.5 PG/ML
RBC # BLD: 4.13 M/CU MM (ref 4.6–6.2)
SEGMENTED NEUTROPHILS ABSOLUTE COUNT: 5.7 K/CU MM
SEGMENTED NEUTROPHILS RELATIVE PERCENT: 72.3 % (ref 36–66)
SODIUM BLD-SCNC: 136 MMOL/L (ref 135–145)
TOTAL IMMATURE NEUTOROPHIL: 0.01 K/CU MM
TOTAL NUCLEATED RBC: 0 K/CU MM
TOTAL PROTEIN: 7.5 GM/DL (ref 6.4–8.2)
TROPONIN T: 0.02 NG/ML
WBC # BLD: 7.8 K/CU MM (ref 4–10.5)

## 2022-09-09 PROCEDURE — 6370000000 HC RX 637 (ALT 250 FOR IP): Performed by: INTERNAL MEDICINE

## 2022-09-09 PROCEDURE — 6360000002 HC RX W HCPCS: Performed by: PHYSICIAN ASSISTANT

## 2022-09-09 PROCEDURE — 99285 EMERGENCY DEPT VISIT HI MDM: CPT

## 2022-09-09 PROCEDURE — 80053 COMPREHEN METABOLIC PANEL: CPT

## 2022-09-09 PROCEDURE — 83690 ASSAY OF LIPASE: CPT

## 2022-09-09 PROCEDURE — 83880 ASSAY OF NATRIURETIC PEPTIDE: CPT

## 2022-09-09 PROCEDURE — 96374 THER/PROPH/DIAG INJ IV PUSH: CPT

## 2022-09-09 PROCEDURE — 85025 COMPLETE CBC W/AUTO DIFF WBC: CPT

## 2022-09-09 PROCEDURE — 2580000003 HC RX 258: Performed by: INTERNAL MEDICINE

## 2022-09-09 PROCEDURE — 93005 ELECTROCARDIOGRAM TRACING: CPT

## 2022-09-09 PROCEDURE — 94761 N-INVAS EAR/PLS OXIMETRY MLT: CPT

## 2022-09-09 PROCEDURE — 84484 ASSAY OF TROPONIN QUANT: CPT

## 2022-09-09 PROCEDURE — 71045 X-RAY EXAM CHEST 1 VIEW: CPT

## 2022-09-09 PROCEDURE — 1200000000 HC SEMI PRIVATE

## 2022-09-09 PROCEDURE — 83735 ASSAY OF MAGNESIUM: CPT

## 2022-09-09 PROCEDURE — 6360000002 HC RX W HCPCS: Performed by: INTERNAL MEDICINE

## 2022-09-09 RX ORDER — FUROSEMIDE 40 MG/1
40 TABLET ORAL 2 TIMES DAILY
Status: DISCONTINUED | OUTPATIENT
Start: 2022-09-10 | End: 2022-09-13

## 2022-09-09 RX ORDER — POLYETHYLENE GLYCOL 3350 17 G/17G
17 POWDER, FOR SOLUTION ORAL DAILY PRN
Status: DISCONTINUED | OUTPATIENT
Start: 2022-09-09 | End: 2022-09-14 | Stop reason: HOSPADM

## 2022-09-09 RX ORDER — ONDANSETRON 4 MG/1
4 TABLET, ORALLY DISINTEGRATING ORAL EVERY 8 HOURS PRN
Status: DISCONTINUED | OUTPATIENT
Start: 2022-09-09 | End: 2022-09-14 | Stop reason: HOSPADM

## 2022-09-09 RX ORDER — IPRATROPIUM BROMIDE AND ALBUTEROL SULFATE 2.5; .5 MG/3ML; MG/3ML
1 SOLUTION RESPIRATORY (INHALATION)
Status: DISCONTINUED | OUTPATIENT
Start: 2022-09-09 | End: 2022-09-09

## 2022-09-09 RX ORDER — DULOXETIN HYDROCHLORIDE 30 MG/1
60 CAPSULE, DELAYED RELEASE ORAL DAILY
Status: DISCONTINUED | OUTPATIENT
Start: 2022-09-10 | End: 2022-09-14 | Stop reason: HOSPADM

## 2022-09-09 RX ORDER — LACTULOSE 10 G/15ML
20 SOLUTION ORAL 3 TIMES DAILY
Status: DISCONTINUED | OUTPATIENT
Start: 2022-09-09 | End: 2022-09-10

## 2022-09-09 RX ORDER — SODIUM CHLORIDE 9 MG/ML
INJECTION, SOLUTION INTRAVENOUS PRN
Status: DISCONTINUED | OUTPATIENT
Start: 2022-09-09 | End: 2022-09-14 | Stop reason: HOSPADM

## 2022-09-09 RX ORDER — SODIUM CHLORIDE 0.9 % (FLUSH) 0.9 %
5-40 SYRINGE (ML) INJECTION EVERY 12 HOURS SCHEDULED
Status: DISCONTINUED | OUTPATIENT
Start: 2022-09-09 | End: 2022-09-14 | Stop reason: HOSPADM

## 2022-09-09 RX ORDER — ALBUTEROL SULFATE 90 UG/1
2 AEROSOL, METERED RESPIRATORY (INHALATION) 4 TIMES DAILY PRN
Status: DISCONTINUED | OUTPATIENT
Start: 2022-09-09 | End: 2022-09-14 | Stop reason: HOSPADM

## 2022-09-09 RX ORDER — PANTOPRAZOLE SODIUM 40 MG/1
40 TABLET, DELAYED RELEASE ORAL
Status: DISCONTINUED | OUTPATIENT
Start: 2022-09-10 | End: 2022-09-14 | Stop reason: HOSPADM

## 2022-09-09 RX ORDER — ENOXAPARIN SODIUM 100 MG/ML
30 INJECTION SUBCUTANEOUS 2 TIMES DAILY
Status: DISCONTINUED | OUTPATIENT
Start: 2022-09-09 | End: 2022-09-10 | Stop reason: CLARIF

## 2022-09-09 RX ORDER — ESOMEPRAZOLE MAGNESIUM 40 MG/1
20 FOR SUSPENSION ORAL DAILY
Status: DISCONTINUED | OUTPATIENT
Start: 2022-09-10 | End: 2022-09-09 | Stop reason: CLARIF

## 2022-09-09 RX ORDER — FUROSEMIDE 10 MG/ML
20 INJECTION INTRAMUSCULAR; INTRAVENOUS ONCE
Status: COMPLETED | OUTPATIENT
Start: 2022-09-09 | End: 2022-09-09

## 2022-09-09 RX ORDER — NADOLOL 20 MG/1
40 TABLET ORAL DAILY
Status: DISCONTINUED | OUTPATIENT
Start: 2022-09-10 | End: 2022-09-14 | Stop reason: HOSPADM

## 2022-09-09 RX ORDER — CHLORAL HYDRATE 500 MG
1 CAPSULE ORAL DAILY
Status: DISCONTINUED | OUTPATIENT
Start: 2022-09-10 | End: 2022-09-09 | Stop reason: CLARIF

## 2022-09-09 RX ORDER — LEVOTHYROXINE SODIUM 0.05 MG/1
50 TABLET ORAL DAILY
Status: DISCONTINUED | OUTPATIENT
Start: 2022-09-10 | End: 2022-09-14 | Stop reason: HOSPADM

## 2022-09-09 RX ORDER — ACETAMINOPHEN 650 MG/1
650 SUPPOSITORY RECTAL EVERY 6 HOURS PRN
Status: DISCONTINUED | OUTPATIENT
Start: 2022-09-09 | End: 2022-09-14 | Stop reason: HOSPADM

## 2022-09-09 RX ORDER — SPIRONOLACTONE 50 MG/1
50 TABLET, FILM COATED ORAL DAILY
Status: DISCONTINUED | OUTPATIENT
Start: 2022-09-10 | End: 2022-09-13

## 2022-09-09 RX ORDER — ONDANSETRON 2 MG/ML
4 INJECTION INTRAMUSCULAR; INTRAVENOUS EVERY 6 HOURS PRN
Status: DISCONTINUED | OUTPATIENT
Start: 2022-09-09 | End: 2022-09-14 | Stop reason: HOSPADM

## 2022-09-09 RX ORDER — ACETAMINOPHEN 325 MG/1
650 TABLET ORAL EVERY 6 HOURS PRN
Status: DISCONTINUED | OUTPATIENT
Start: 2022-09-09 | End: 2022-09-14 | Stop reason: HOSPADM

## 2022-09-09 RX ORDER — SODIUM CHLORIDE 0.9 % (FLUSH) 0.9 %
5-40 SYRINGE (ML) INJECTION PRN
Status: DISCONTINUED | OUTPATIENT
Start: 2022-09-09 | End: 2022-09-14 | Stop reason: HOSPADM

## 2022-09-09 RX ADMIN — RIFAXIMIN 550 MG: 550 TABLET ORAL at 21:51

## 2022-09-09 RX ADMIN — ENOXAPARIN SODIUM 30 MG: 100 INJECTION SUBCUTANEOUS at 21:50

## 2022-09-09 RX ADMIN — LACTULOSE 20 G: 10 SOLUTION ORAL at 21:49

## 2022-09-09 RX ADMIN — SODIUM CHLORIDE, PRESERVATIVE FREE 10 ML: 5 INJECTION INTRAVENOUS at 21:54

## 2022-09-09 RX ADMIN — FUROSEMIDE 20 MG: 10 INJECTION, SOLUTION INTRAVENOUS at 18:57

## 2022-09-09 NOTE — H&P
History and Physical      Name:  Cammy Terry /Age/Sex: 1945  (78 y.o. male)   MRN & CSN:  [id number] & [id number] Encounter Date/Time: 2022 7:04 PM EDT   Location:  ED22/ED-22 PCP: Miguel Martinez MD       Hospital Day: 1    Assessment and Plan:     #. Recurrent right-sided pleural effusion  -Patient had thoracentesis-2022-2 L of pleural fluid removed. -Patient had recurrent pleural fluid and had thoracentesis since 2022 (, , , , )  -Consult pulmonology. #.  Recurrent ascites  -7170 cc ascitic fluid tapped on 2022  -Prior paracentesis-3/21/2022, , , ,     #. CANDIE on CKD  -Could be secondary to hepatorenal syndrome  -Continue diuresis, paracentesis and monitor with repeat BMP    #. Alcoholic liver cirrhosis-diagnosed   #. Portal hypertension   -EGD-2022-moderate-large esophageal varices  -Patient is on nadolol-continue  -Patient follows with Dr. Marcus Campos    #. History of hepatic encephalopathy  -Continue lactulose, Xifaxan  -Check ammonia with morning labs. #.  Hypothyroidism-continue levothyroxine    #. Depression-continue duloxetine. #.  Obesity with BMI 36.64  Disposition:   Current Living situation: home    Diet Low-sodium   DVT Prophylaxis [x] Lovenox, []  Heparin, [] SCDs, [] Ambulation,  [] Eliquis, [] Xarelto   Code Status Prior   Surrogate Decision Maker/ POA      History from:   EMR, patient. History of Present Illness:     Chief Complaint: Pleural effusion  Cammy Terry is a 78 y.o. male with alcoholic liver cirrhosis, portal hypertension, recurrent right-sided pleural effusion, hypothyroidism, depression presented to ED with complaints of worsening shortness of breath. Patient is very hard of hearing, most of the information was provided by patient's wife at bedside. According to her patient had recent paracentesis and thoracentesis.   Patient again developed abdominal distention, feeling more short of breath with minimal activity recently. She denied patient having any fever, chills, cough, denied any urinary complaints, denied any constipation or diarrhea. Patient reported that he is compliant with lactulose and has 2-3 bowel movements some days and does not have any bowel movement some days. Also admits to having bilateral lower extremity edema. At presentation patient was noted to have /72, HR 64, RR 18, temp 98.2, saturating 92% on room air. Lab work significant for hemoglobin 13.1, platelets 465, BUN 43, creatinine 2, troponin 0.017, albumin 2.8, AST 51, lipase 48. Chest x-ray-large right pleural effusion with complete whiteout of the right chest.  Patient received 20 mg IV Lasix in ED. Review of Systems: Need 10 Elements   10 point review of systems conducted and pertinent positives and negatives as per HPI. Objective:   No intake or output data in the 24 hours ending 09/09/22 1904   Vitals:   Vitals:    09/09/22 1616   BP: 122/72   Pulse: 64   Resp: 18   Temp: 98.3 °F (36.8 °C)   TempSrc: Oral   SpO2: 92%       Medications Prior to Admission   Reviewed medications with patient's wife    Prior to Admission medications    Medication Sig Start Date End Date Taking?  Authorizing Provider   albuterol sulfate HFA (VENTOLIN HFA) 108 (90 Base) MCG/ACT inhaler Inhale 2 puffs into the lungs 4 times daily as needed for Wheezing 7/26/22   Radha Garcia MD   nadolol (CORGARD) 40 MG tablet Take 1 tablet by mouth in the morning. 7/20/22 8/26/22  MOHINDER Marmolejo CNP   furosemide (LASIX) 40 MG tablet Take 1 tablet by mouth daily 7/12/22   MOHINDER Carson Mai, CNP   spironolactone (ALDACTONE) 50 MG tablet Take 1 tablet by mouth daily 7/12/22   MOHINDER Carson Mai, CNP   rifAXIMin [place]) 550 MG tablet Take 1 tablet by mouth 2 times daily 7/12/22   MOHINDER Carson Mai, CNP   levothyroxine (SYNTHROID) 50 MCG tablet Take 1 tablet by mouth Daily 3/30/22 9/26/22  Radha Garcia MD lactulose (CHRONULAC) 10 GM/15ML solution Take 30 mLs by mouth 3 times daily Hold if having diarrhea 3/21/22   Victor Manuel Rojas MD   DULoxetine (CYMBALTA) 60 MG extended release capsule TAKE 1 CAPSULE BY MOUTH ONCE DAILY 3/11/22 1/13/23  Freda Smith MD   Multiple Vitamin (MULTI VITAMIN MENS PO) Take 1 tablet by mouth daily    Historical Provider, MD   ESOMEPRAZOLE MAGNESIUM PO Take 20 mg by mouth daily OTC    Historical Provider, MD   Scheller-3 1000 MG CAPS Take 1 capsule by mouth daily    Historical Provider, MD       Physical Exam: Need 8 Elements   Physical Exam     GEN  -Awake, alert, NAD.   EYES   -PERRL. HENT  -MM are moist.   RESP  -decreased breath sounds on the right side, no respiratory distress noted. C/V  -S1/S2 auscultated. RRR without appreciable M/R/G. + peripheral edema. No reproducible chest wall tenderness. GI  -Abdomen is soft, distended, no significant tenderness. No masses or guarding. + BS in all quadrants. Rectal exam deferred.   -No CVA tenderness. Schilling catheter is not present. MS  -B/L extremities - No gross joint deformities. + swelling, intact sensation symmetrical.   SKIN  -Normal coloration, warm, dry. NEURO  - Awake, alert, answering questions appropriately  PSYC  - Appropriate affect. Past Medical History:   Reviewed patient's past medical, surgical, social, family history and allergies. PMHx   Past Medical History:   Diagnosis Date    Anxiety     Cirrhosis, alcoholic ([address]     Diabetes mellitus ([address]     GERD (gastroesophageal reflux disease) 2000    GERD (gastroesophageal reflux disease)     Hyperlipidemia     Hypertension     Portal hypertension (HCC)     Pulmonary nodules     Sleep apnea     SOB (shortness of breath)     Thyroid disease      PSHX:  has a past surgical history that includes Cholecystectomy; Vasectomy; Colonoscopy; Endoscopy, colon, diagnostic; Foot surgery; Upper gastrointestinal endoscopy (N/A, 1/19/2022);  Tonsillectomy; and Appendectomy. Allergies: Allergies   Allergen Reactions    Lisinopril Swelling     Tongue swelling      Zetia [Ezetimibe] Swelling     Facial swelling    Atorvastatin     Codeine Other (See Comments)     Blood pressure drops    Hydrochlorothiazide     Hydroxyzine      Fatigue and depressed    Lexapro [Escitalopram Oxalate]      Increased depression    Pravastatin      Fam HX: family history includes Diabetes in an other family member; Hypertension in his brother.   Soc HX: Quit smoking, alcohol, denied any illicit drug use  Social History     Socioeconomic History    Marital status:      Spouse name: None    Number of children: None    Years of education: None    Highest education level: None   Occupational History     Comment: Retired    Tobacco Use    Smoking status: Former     Packs/day: 1.00     Years: 14.00     Pack years: 14.00     Types: Cigarettes     Quit date:      Years since quittin.7    Smokeless tobacco: Never   Vaping Use    Vaping Use: Never used   Substance and Sexual Activity    Alcohol use: Not Currently    Drug use: Never    Sexual activity: Yes     Partners: Female     Social Determinants of Health     Financial Resource Strain: Low Risk     Difficulty of Paying Living Expenses: Not hard at all   Food Insecurity: No Food Insecurity    Worried About Running Out of Food in the Last Year: Never true    Ran Out of Food in the Last Year: Never true       Medications:   Medications:    Infusions:   PRN Meds:     Labs      CBC:   Recent Labs     22  1625   WBC 7.8   HGB 13.1*        BMP:    Recent Labs     22  1625      K 4.6   CL 96*   CO2 31   BUN 43*   CREATININE 2.0*   GLUCOSE 99     Hepatic:   Recent Labs     22  1625   AST 51*   ALT 39   BILITOT 0.7   ALKPHOS 107     Lipids:   Lab Results   Component Value Date/Time    CHOL 174 2022 03:25 AM    CHOL 230 10/14/2019 01:45 PM    HDL 28 2022 03:25 AM    TRIG 130 2022 03:25 AM Hemoglobin A1C:   Lab Results   Component Value Date/Time    LABA1C 5.5 06/22/2022 10:23 AM     TSH:   Lab Results   Component Value Date/Time    TSH 1.39 10/14/2019 01:45 PM     Troponin:   Lab Results   Component Value Date/Time    TROPONINT 0.017 09/09/2022 04:25 PM    TROPONINT 0.027 06/21/2022 08:27 PM    TROPONINT 0.020 06/21/2022 02:42 PM     Lactic Acid: No results for input(s): LACTA in the last 72 hours. BNP:   Recent Labs     09/09/22  1625   PROBNP 266.5     UA:  Lab Results   Component Value Date/Time    NITRU NEGATIVE 06/22/2022 12:10 PM    COLORU YELLOW 06/22/2022 12:10 PM    WBCUA NONE SEEN 06/22/2022 12:10 PM    RBCUA 1 06/22/2022 12:10 PM    MUCUS RARE 06/22/2022 12:10 PM    TRICHOMONAS NONE SEEN 06/22/2022 12:10 PM    BACTERIA NEGATIVE 06/22/2022 12:10 PM    CLARITYU CLEAR 06/22/2022 12:10 PM    SPECGRAV 1.020 06/22/2022 12:10 PM    LEUKOCYTESUR NEGATIVE 06/22/2022 12:10 PM    UROBILINOGEN 1.0 06/22/2022 12:10 PM    BILIRUBINUR NEGATIVE 06/22/2022 12:10 PM    BLOODU TRACE 06/22/2022 12:10 PM    KETUA NEGATIVE 06/22/2022 12:10 PM     Urine Cultures: No results found for: LABURIN  Blood Cultures: No results found for: BC  No results found for: BLOODCULT2  Organism: No results found for: ORG    Imaging/Diagnostics Last 24 Hours   XR CHEST PORTABLE    Result Date: 9/9/2022  EXAMINATION: ONE XRAY VIEW OF THE CHEST 9/9/2022 4:57 pm COMPARISON: August 18, 2022 HISTORY: ORDERING SYSTEM PROVIDED HISTORY: Shortness of Breath TECHNOLOGIST PROVIDED HISTORY: Reason for exam:->Shortness of Breath Reason for Exam: sob, shaky FINDINGS: Whiteout redemonstrated of the right chest.  The left lung appears clear. Cardiomediastinal silhouette not appreciably changed. No pneumothorax or subdiaphragmatic free air. No acute osseous abnormality identified.      Large right pleural effusion with complete whiteout of the right chest.       Personally reviewed Lab Studies, Imaging, and discussed case with ED provider.     Electronically signed by Fern Herrera MD on 9/9/2022 at 7:04 PM

## 2022-09-09 NOTE — ED PROVIDER NOTES
EMERGENCY DEPARTMENT ENCOUNTER    [facility] EMERGENCY DEPARTMENT        TRIAGE CHIEF COMPLAINT:   Shortness of Breath and Weight Gain (6lbs in 1 day)      JamulShannan Sahu is a 78 y.o. male that presents with wife with concern for shortness of breath, unexpected weight gain and increasing lower leg/abdominal swelling. Context is patient has a history of alcoholic cirrhosis of the liver as well as recurrent right-sided pleural effusions. Wife states that patient had a thoracocentesis 2 weeks ago and a paracentesis a week before that. For the last several days, patient has had progressively worsening shortness of breath, worse with exertion and when laying down flat. He has been on 2 L nasal cannula continuously over the last 24 hours, wife states usually he is on it as needed. They have noted increasing swelling in the lower extremities as well as in the abdomen patient is denying abdominal pain. Is endorsing no chest pain dizziness or lightheadedness. Patient is very hard of hearing. No fever chills, productive cough. Patient is on 2 diuretics which she has been compliant with. Follows with local GI, Dr. Enmanuel iRbeiro. No black tarry stools or bright red blood per rectum. Wife does state patient has been acting at normal baseline mental status, no concern for confusion or changes in altered mental status. No fever or chills. No sick contacts.   Patient has noted 6 pound unintentional weight gain over the last 2 days and states he was sent in by his cardiologist.    ROS:  General:  No fevers, no chills   Cardiovascular:  No chest pain, no palpitations  Respiratory:  See HPI   Gastrointestinal:  See HPI    Musculoskeletal:  No muscle pain, no joint pain  Skin:  No rash, no pruritis, no easy bruising  Neurologic:  No speech problems, no headache, no extremity numbness, no extremity tingling, no extremity weakness  Psychiatric:  No anxiety  Genitourinary:  No file   Physical Activity: Not on file   Stress: Not on file   Social Connections: Not on file   Intimate Partner Violence: Not on file   Housing Stability: Not on file     Current Facility-Administered Medications   Medication Dose Route Frequency Provider Last Rate Last Admin    furosemide (LASIX) injection 20 mg  20 mg IntraVENous Once Carolina Oh PA-C         Current Outpatient Medications   Medication Sig Dispense Refill    albuterol sulfate HFA (VENTOLIN HFA) 108 (90 Base) MCG/ACT inhaler Inhale 2 puffs into the lungs 4 times daily as needed for Wheezing 1 each 0    nadolol (CORGARD) 40 MG tablet Take 1 tablet by mouth in the morning.  90 tablet 3    furosemide (LASIX) 40 MG tablet Take 1 tablet by mouth daily 30 tablet 5    spironolactone (ALDACTONE) 50 MG tablet Take 1 tablet by mouth daily 30 tablet 5    rifAXIMin (XIFAXAN) 550 MG tablet Take 1 tablet by mouth 2 times daily 60 tablet 5    levothyroxine (SYNTHROID) 50 MCG tablet Take 1 tablet by mouth Daily 90 tablet 1    lactulose (CHRONULAC) 10 GM/15ML solution Take 30 mLs by mouth 3 times daily Hold if having diarrhea 2700 mL 5    DULoxetine (CYMBALTA) 60 MG extended release capsule TAKE 1 CAPSULE BY MOUTH ONCE DAILY 90 capsule 1    Multiple Vitamin (MULTI VITAMIN MENS PO) Take 1 tablet by mouth daily      ESOMEPRAZOLE MAGNESIUM PO Take 20 mg by mouth daily OTC      Omega-3 1000 MG CAPS Take 1 capsule by mouth daily       Allergies   Allergen Reactions    Lisinopril Swelling     Tongue swelling      Zetia [Ezetimibe] Swelling     Facial swelling    Atorvastatin     Codeine Other (See Comments)     Blood pressure drops    Hydrochlorothiazide     Hydroxyzine      Fatigue and depressed    Lexapro [Escitalopram Oxalate]      Increased depression    Pravastatin        Nursing Notes Reviewed  PHYSICAL EXAM    VITAL SIGNS: /72   Pulse 64   Temp 98.3 °F (36.8 °C) (Oral)   Resp 18   SpO2 92%    Constitutional:  Well developed, evaded BMI, nontoxic, in no acute distress  Head:  Normocephalic, Atraumatic  Eyes:  EOMI. Sclera clear. Conjunctiva normal, No discharge. Neck/Lymphatics: Supple, no JVD, No lymphadenopathy  Cardiovascular:  RRR, Normal S1 & S2  No murmurs/gallops/rubs  Peripheral Vascular: Distal pulses 2+, Capillary refill <2seconds  Respiratory: Noted conversational dyspnea, was noted hypoxic on room air by ED nurse, currently on home 2 L and is 100%. Mildly labored breathing, 22 breaths/min. Decreased breath sounds throughout the right lung fields with scattered rales. No retractions or accessory muscle use. No stridor. GI:   No gross discoloration. Bowel sounds present in all quadrants, No audible bruits. Soft, abdomen is distended but not tight drum. Palpable fluid wave. No localized abdominal tenderness without rebound tenderness or guarding, No palpable pulsatile masses or obvious hernias. Back:  No CVA tenderness to percussion. Musculoskeletal: 1+ equal pretibial eating edema with otherwise supple nontender and symmetrical calves. BUE/BLE symmetrical without atrophy or deformities  Integument:  Warm, Dry, Intact, Skin turgor and texture normal  Neurologic:  Alert & oriented x3 , No focal deficits noted. Cranial nerves II through XII grossly intact. No slurred speech. No facial droop. Globally deconditioned, but equal gross motor coordination & motor strength bilateral upper and lower extremities.     Psychiatric:  Affect appropriate      I have reviewed and interpreted all of the currently available lab results from this visit (if applicable):  Results for orders placed or performed during the hospital encounter of 09/09/22   CBC with Auto Differential   Result Value Ref Range    WBC 7.8 4.0 - 10.5 K/CU MM    RBC 4.13 (L) 4.6 - 6.2 M/CU MM    Hemoglobin 13.1 (L) 13.5 - 18.0 GM/DL    Hematocrit 40.1 (L) 42 - 52 %    MCV 97.1 78 - 100 FL    MCH 31.7 (H) 27 - 31 PG    MCHC 32.7 32.0 - 36.0 %    RDW 15.1 (H) 11.7 - 14.9 % obtained):  [] The following radiograph was interpreted by myself in the absence of a radiologist:   [] Radiologist's Report Reviewed:  XR CHEST PORTABLE   Final Result   Large right pleural effusion with complete whiteout of the right chest.               EKG Interpretation  Please see ED physician's note - Dr. Raven Bahena - for EKG interpretation        Chart review shows recent radiographs:  XR CHEST (2 VW)    Result Date: 8/15/2022  EXAMINATION: TWO XRAY VIEWS OF THE CHEST 8/15/2022 12:14 pm COMPARISON: 06/26/2022 HISTORY: ORDERING SYSTEM PROVIDED HISTORY: Recurrent right pleural effusion TECHNOLOGIST PROVIDED HISTORY: Reason for exam:->SOBOE, recurrent right pleural effusion FINDINGS: Total opacification of the right hemithorax again noted. Heart size stable. Left lung appears clear. No pneumothorax. Stable chest again demonstrating total opacification of the right hemithorax     XR CHEST PORTABLE    Result Date: 9/9/2022  EXAMINATION: ONE XRAY VIEW OF THE CHEST 9/9/2022 4:57 pm COMPARISON: August 18, 2022 HISTORY: ORDERING SYSTEM PROVIDED HISTORY: Shortness of Breath TECHNOLOGIST PROVIDED HISTORY: Reason for exam:->Shortness of Breath Reason for Exam: sob, shaky FINDINGS: Whiteout redemonstrated of the right chest.  The left lung appears clear. Cardiomediastinal silhouette not appreciably changed. No pneumothorax or subdiaphragmatic free air. No acute osseous abnormality identified. Large right pleural effusion with complete whiteout of the right chest.     XR CHEST PORTABLE    Result Date: 8/18/2022  EXAMINATION: ONE XRAY VIEW OF THE CHEST 8/18/2022 1:24 pm COMPARISON: Chest radiograph 08/15/2022 HISTORY: ORDERING SYSTEM PROVIDED HISTORY: right thoracentesis TECHNOLOGIST PROVIDED HISTORY: Call Dr. Umu Napoles with CXR results & discharge orders Reason for exam:->right thoracentesis Reason for Exam: right thoracentesis FINDINGS: Single view provided. Stable mild leftward mediastinal shift.   The left lung demonstrates no consolidation or significant effusion. No pneumothorax. There is stable complete opacification of the right hemithorax. No subcutaneous emphysema. Stable complete opacification of the right hemithorax and stable mild leftward mediastinal shift consistent with large effusion and atelectasis. IR US GUIDED PARACENTESIS    Result Date: 8/26/2022  PROCEDURE: PARACENTESIS WITHOUT IMAGE GUIDANCE US ABDOMEN LIMITED 8/26/2022 HISTORY: ORDERING SYSTEM PROVIDED HISTORY: Other ascites TECHNOLOGIST PROVIDED HISTORY: Remove as much fluid as possible Please send fluid for: cell count and diff, culture and sensitivity Please give 6-8 grams albumin IV for each liter fluid removed Please innoculate fluid immediately into aerobic and non-aerobic blood culture bottles for the culture specimen Reason for exam:->ascites TECHNIQUE: Maximum sterile barrier technique including hand hygiene, skin prep and sterile ultrasound technique utilized for procedure. Sterile ultrasound technique also utilized for procedure Ultrasound guidance required for procedure to confirm presence of ascites, puncture site selection, real-time intra procedural guidance. Images made for patient's medical file. Informed consent was obtained after a detailed explanation of the procedure including risks, benefits, and alternatives. Universal protocol was followed. A limited ultrasound of the abdomen was performed. The right abdomen was prepped and draped in sterile fashion and local anesthesia was achieved with lidocaine. An 6 Pashto needle sheath was advanced into ascites and paracentesis was performed. The patient tolerated the procedure well. FINDINGS: Limited ultrasound of the abdomen demonstrates ascites with the paracentesis catheter within it. A total of 7170 mL cloudy white ascitic fluid was removed. 1020 mL sample sent for laboratory evaluation. No complication suggested.      Successful ultrasound-guided paracentesis with specimen sent for laboratory evaluation. ED COURSE & MEDICAL DECISION MAKING       Vital signs and nursing notes reviewed during ED course. I have independently evaluated this patient . Supervising physician - Dr. Buck Olsen - was present in ED and available for consultation throughout entirety of patient's care. All pertinent Lab data and radiographic results reviewed with patient at bedside. The patient and/or the family were informed of the results of any tests/labs/imaging, the treatment plan, and time was allotted to answer questions. Clinical Impression:  1. Recurrent pleural effusion on right    2. Ascites due to alcoholic cirrhosis ([address]    3. Dyspnea and respiratory abnormalities    4. CANDIE (acute kidney injury) [facility])        Patient presents with shortness of breath, unintentional weight gain and increasing lower leg/abdominal swelling with history of cirrhosis of the liver. On exam, pleasant 71-year-old male, awake and alert, answering all questions appropriately. Appears globally deconditioned and noted to have conversational dyspnea. Was initially 92% on room air in triage vitals, prior to my evaluation, ED nurse did note that he was dropping to the mid 80s on room air and is now on his home 2 L nasal cannula at 100%. There is very poor diminished air exchange throughout the right lung fields without stridor. Scattered rales. Noted equal pretibial pitting edema as well as abdominal distention with fluid wave but not tight drum. CBC shows normal white count, hemoglobin 13.1 stable to baseline. Normal platelets. BMP does show CANDIE with BUN/creatinine 43/2.0.  CO2 is 31 with normal anion gap. Normal potassium. Magnesium, BNP within normal range. Troponin is detectable at 0.017 however this is down trended compared to previous. No acute ischemic features on EKG.   Chest x-ray shows large right pleural effusion with complete white out of the right chest.  This time, suspect shortness of breath is secondary to his pleural effusion, patient overall does appear fluid overloaded on exam given the noted ascites and pitting edema to the lower legs. To start him on IV Lasix. Do feel admission is warranted given his CANDIE and likely need for repeat thoracic centesis. Plan for admission discussed with patient and family who verbalized understanding and agreement      I did consult with Dr. Marycruz Pfeiffer - hospitalist - and discussed patient's history, ED presentation/course including any pertinent laboratory findings and imaging study findings. He/she agrees to hospital admission. Patient is admitted to the hospital in stable condition. I did discuss this patient's history, ED presentation/course with my attending physician - Dr. Sushma Hale - who has also seen and evaluated this patient. He/she does agree that admission is reasonable at this time. Please see his/her note for additional details of their evaluation. Diagnosis and plan discussed in detail with patient who understands and agrees. Patient agrees to return emergency department if symptoms worsen or any new symptoms develop. Disposition referral (if applicable):  No follow-up provider specified.     Disposition medications (if applicable):  New Prescriptions    No medications on file         (Please note that portions of this note may have been completed with a voice recognition program. Efforts were made to edit the dictations but occasionally words are mis-transcribed.)          Ez Barnes PA-C  09/09/22 [id number]

## 2022-09-10 LAB
ALBUMIN SERPL-MCNC: 3 GM/DL (ref 3.4–5)
ALP BLD-CCNC: 106 IU/L (ref 40–128)
ALT SERPL-CCNC: 38 U/L (ref 10–40)
AMMONIA: 148 UMOL/L (ref 16–60)
ANION GAP SERPL CALCULATED.3IONS-SCNC: 8 MMOL/L (ref 4–16)
AST SERPL-CCNC: 49 IU/L (ref 15–37)
BASOPHILS ABSOLUTE: 0.1 K/CU MM
BASOPHILS RELATIVE PERCENT: 0.8 % (ref 0–1)
BILIRUB SERPL-MCNC: 0.7 MG/DL (ref 0–1)
BUN BLDV-MCNC: 42 MG/DL (ref 6–23)
CALCIUM SERPL-MCNC: 9.8 MG/DL (ref 8.3–10.6)
CHLORIDE BLD-SCNC: 98 MMOL/L (ref 99–110)
CO2: 30 MMOL/L (ref 21–32)
CREAT SERPL-MCNC: 1.8 MG/DL (ref 0.9–1.3)
DIFFERENTIAL TYPE: ABNORMAL
EKG ATRIAL RATE: 66 BPM
EKG DIAGNOSIS: NORMAL
EKG P AXIS: 8 DEGREES
EKG P-R INTERVAL: 160 MS
EKG Q-T INTERVAL: 430 MS
EKG QRS DURATION: 74 MS
EKG QTC CALCULATION (BAZETT): 450 MS
EKG R AXIS: 44 DEGREES
EKG T AXIS: 27 DEGREES
EKG VENTRICULAR RATE: 66 BPM
EOSINOPHILS ABSOLUTE: 0.2 K/CU MM
EOSINOPHILS RELATIVE PERCENT: 3.4 % (ref 0–3)
GFR AFRICAN AMERICAN: 45 ML/MIN/1.73M2
GFR NON-AFRICAN AMERICAN: 37 ML/MIN/1.73M2
GLUCOSE BLD-MCNC: 107 MG/DL (ref 70–99)
HCT VFR BLD CALC: 39.4 % (ref 42–52)
HEMOGLOBIN: 12.9 GM/DL (ref 13.5–18)
IMMATURE NEUTROPHIL %: 0.3 % (ref 0–0.43)
LYMPHOCYTES ABSOLUTE: 0.9 K/CU MM
LYMPHOCYTES RELATIVE PERCENT: 12.7 % (ref 24–44)
MAGNESIUM: 1.9 MG/DL (ref 1.8–2.4)
MCH RBC QN AUTO: 32 PG (ref 27–31)
MCHC RBC AUTO-ENTMCNC: 32.7 % (ref 32–36)
MCV RBC AUTO: 97.8 FL (ref 78–100)
MONOCYTES ABSOLUTE: 0.9 K/CU MM
MONOCYTES RELATIVE PERCENT: 13.1 % (ref 0–4)
NUCLEATED RBC %: 0 %
PDW BLD-RTO: 15 % (ref 11.7–14.9)
PHOSPHORUS: 3.5 MG/DL (ref 2.5–4.9)
PLATELET # BLD: 165 K/CU MM (ref 140–440)
PMV BLD AUTO: 10.4 FL (ref 7.5–11.1)
POTASSIUM SERPL-SCNC: 4.4 MMOL/L (ref 3.5–5.1)
RBC # BLD: 4.03 M/CU MM (ref 4.6–6.2)
SEGMENTED NEUTROPHILS ABSOLUTE COUNT: 5 K/CU MM
SEGMENTED NEUTROPHILS RELATIVE PERCENT: 69.7 % (ref 36–66)
SODIUM BLD-SCNC: 136 MMOL/L (ref 135–145)
TOTAL IMMATURE NEUTOROPHIL: 0.02 K/CU MM
TOTAL NUCLEATED RBC: 0 K/CU MM
TOTAL PROTEIN: 6.6 GM/DL (ref 6.4–8.2)
WBC # BLD: 7.2 K/CU MM (ref 4–10.5)

## 2022-09-10 PROCEDURE — 83735 ASSAY OF MAGNESIUM: CPT

## 2022-09-10 PROCEDURE — 85025 COMPLETE CBC W/AUTO DIFF WBC: CPT

## 2022-09-10 PROCEDURE — 2580000003 HC RX 258: Performed by: INTERNAL MEDICINE

## 2022-09-10 PROCEDURE — 80053 COMPREHEN METABOLIC PANEL: CPT

## 2022-09-10 PROCEDURE — 82140 ASSAY OF AMMONIA: CPT

## 2022-09-10 PROCEDURE — 94761 N-INVAS EAR/PLS OXIMETRY MLT: CPT

## 2022-09-10 PROCEDURE — 36415 COLL VENOUS BLD VENIPUNCTURE: CPT

## 2022-09-10 PROCEDURE — 6370000000 HC RX 637 (ALT 250 FOR IP): Performed by: INTERNAL MEDICINE

## 2022-09-10 PROCEDURE — 6370000000 HC RX 637 (ALT 250 FOR IP): Performed by: STUDENT IN AN ORGANIZED HEALTH CARE EDUCATION/TRAINING PROGRAM

## 2022-09-10 PROCEDURE — 93010 ELECTROCARDIOGRAM REPORT: CPT | Performed by: INTERNAL MEDICINE

## 2022-09-10 PROCEDURE — 84100 ASSAY OF PHOSPHORUS: CPT

## 2022-09-10 PROCEDURE — 99222 1ST HOSP IP/OBS MODERATE 55: CPT | Performed by: SPECIALIST

## 2022-09-10 PROCEDURE — 1200000000 HC SEMI PRIVATE

## 2022-09-10 PROCEDURE — 6360000002 HC RX W HCPCS: Performed by: INTERNAL MEDICINE

## 2022-09-10 RX ORDER — LACTULOSE 10 G/15ML
30 SOLUTION ORAL EVERY 6 HOURS SCHEDULED
Status: DISCONTINUED | OUTPATIENT
Start: 2022-09-10 | End: 2022-09-14 | Stop reason: HOSPADM

## 2022-09-10 RX ORDER — ENOXAPARIN SODIUM 100 MG/ML
30 INJECTION SUBCUTANEOUS 2 TIMES DAILY
Status: DISCONTINUED | OUTPATIENT
Start: 2022-09-10 | End: 2022-09-14 | Stop reason: HOSPADM

## 2022-09-10 RX ADMIN — LACTULOSE 30 G: 10 SOLUTION ORAL at 17:59

## 2022-09-10 RX ADMIN — LACTULOSE 20 G: 10 SOLUTION ORAL at 13:55

## 2022-09-10 RX ADMIN — SPIRONOLACTONE 50 MG: 50 TABLET ORAL at 10:37

## 2022-09-10 RX ADMIN — ENOXAPARIN SODIUM 30 MG: 100 INJECTION SUBCUTANEOUS at 20:35

## 2022-09-10 RX ADMIN — LACTULOSE 20 G: 10 SOLUTION ORAL at 10:37

## 2022-09-10 RX ADMIN — FUROSEMIDE 40 MG: 40 TABLET ORAL at 17:59

## 2022-09-10 RX ADMIN — LEVOTHYROXINE SODIUM 50 MCG: 0.05 TABLET ORAL at 06:47

## 2022-09-10 RX ADMIN — ENOXAPARIN SODIUM 30 MG: 100 INJECTION SUBCUTANEOUS at 10:45

## 2022-09-10 RX ADMIN — SODIUM CHLORIDE, PRESERVATIVE FREE 10 ML: 5 INJECTION INTRAVENOUS at 20:36

## 2022-09-10 RX ADMIN — NADOLOL 40 MG: 20 TABLET ORAL at 10:37

## 2022-09-10 RX ADMIN — DULOXETINE 60 MG: 30 CAPSULE, DELAYED RELEASE ORAL at 10:37

## 2022-09-10 RX ADMIN — PANTOPRAZOLE SODIUM 40 MG: 40 TABLET, DELAYED RELEASE ORAL at 06:47

## 2022-09-10 RX ADMIN — RIFAXIMIN 550 MG: 550 TABLET ORAL at 10:37

## 2022-09-10 RX ADMIN — RIFAXIMIN 550 MG: 550 TABLET ORAL at 20:35

## 2022-09-10 RX ADMIN — FUROSEMIDE 40 MG: 40 TABLET ORAL at 06:51

## 2022-09-10 RX ADMIN — SODIUM CHLORIDE, PRESERVATIVE FREE 10 ML: 5 INJECTION INTRAVENOUS at 10:38

## 2022-09-10 ASSESSMENT — PAIN SCALES - GENERAL: PAINLEVEL_OUTOF10: 0

## 2022-09-10 NOTE — PROGRESS NOTES
V2.0  [facility]      Name:  Robby Gaitan /Age/Sex: 1945  (78 y.o. male)   MRN & CSN:  [id number] & [id number] Encounter Date/Time: 9/10/2022 7:17 AM EDT    Location:  [id number]-N PCP: Doron Caldera, [address] Day: 2    Assessment and Plan:   Robby Gaitan is a 78 y.o. male with PMH of alcoholic liver cirrhosis, portal hypertension, recurrent right-sided pleural effusion, recurrent ascites hypothyroidism and depression presented to ED with complaints of worsening shortness of breath. CXR shows large right pleural effusion with whiteout of the right chest. HD stable with O2 sat of 92% on RA. Labs were notable for BUN 43, Cr of 2 (bl 0.9-1.2), hemoglobin 13.1, platelets 099, troponin 0.017, albumin 2.8, AST 51, and lipase 48. Patient will likely require thoracentesis for symptomatic relief. Follow up with hepatology at [facility] has been advised in the past.    Plan:  #Recurrent right-sided pleural effusion s/p multiple thoracentesis  #Recurrent ascites s/p multiple paracentesis  #Alcoholic liver cirrhosis-diagnosed   #Portal hypertension   #H/O esophageal varices  2022  #History of hepatic encephalopathy  --S/P thoracentesis on 2022 w/ 2 L of pleural fluid removed. --S/P paracentesis on 22 w/ ~7L of fluid removed  --Continue nadolol, spirinolactone and lasix  --Continue lactulose and Xifan, f/u NH3 with labs this morning  --Patient follows with Dr. Walter Friends, consulted, will appreciate recs  --Pulmonology consulted, will appreciate recs    #CANDIE on CKD  --Could be 2/2 hepatorenal syndrome or in light of diuresis  --Continue diuresis, trend Cr, avoid nephrotoxins   --Nephrology consult if Cr worsens      #Hypothyroidism  --Continue levothyroxine     #Depression  --Continue duloxetine. #Obesity with BMI 36.64    Diet ADULT DIET; Regular;  Low Sodium (2 gm)   DVT Prophylaxis [x] Lovenox, []  Heparin, [] SCDs, [] Ambulation,  [] Eliquis, [] Xarelto  [] Coumadin   Code Status Full Code   Disposition From: Home  Expected Disposition: TBD  Estimated Date of Discharge: TBD  Patient requires continued admission due to Worsened dyspnea in the setting of recurrent pleural effusion and ascites requiring intervention   Surrogate Decision Maker/ POA      Subjective:     Chief Complaint: Shortness of Breath and Weight Gain (6lbs in 1 day)       Edgard Cain is a 78 y.o. male who presents with worsening shortness of breath and edema. He is examined at bedside, extremely [place]. He endorses that he is doing ok, although still having some difficulty with his breathing, some worsening in his abdominal distension and swelling in his lower extremities b/l. He denies and pain, fever, chills, N/V/D. Review of Systems:    Review of Systems    Negative 10 point review system except as mentioned above    Objective: Intake/Output Summary (Last 24 hours) at 9/10/2022 0717  Last data filed at 9/9/2022 2117  Gross per 24 hour   Intake --   Output 700 ml   Net -700 ml        Vitals:   Vitals:    09/10/22 0306   BP: 106/87   Pulse: 64   Resp: 16   Temp: 98 °F (36.7 °C)   SpO2: 98%       Physical Exam:   General: NAD, on O2 via NC, extremely [place], but pleasant  Eyes: EOMI  HENT: NCAT, moist mucous membranes  Cardiovascular: Regular rate. Respiratory: Unable to appreciate breath sounds in the right hemispher, good air entry in the left  Gastrointestinal: Distended, non tender to palpation, BS appreciated  Genitourinary: no suprapubic tenderness  Musculoskeletal: Mildly pitting edema b/l, sensations intact  Skin: warm, dry  Neuro: Alert. Psych: Mood appropriate.      Medications:   Medications:    sodium chloride flush  5-40 mL IntraVENous 2 times per day    enoxaparin  30 mg SubCUTAneous BID    DULoxetine  60 mg Oral Daily    furosemide  40 mg Oral BID    lactulose  20 g Oral TID    levothyroxine  50 mcg Oral Daily    nadolol  40 mg Oral Daily    rifAXIMin  550 mg Oral BID spironolactone  50 mg Oral Daily    pantoprazole  40 mg Oral QAM AC      Infusions:    sodium chloride       PRN Meds: sodium chloride flush, 5-40 mL, PRN  sodium chloride, , PRN  ondansetron, 4 mg, Q8H PRN   Or  ondansetron, 4 mg, Q6H PRN  polyethylene glycol, 17 g, Daily PRN  acetaminophen, 650 mg, Q6H PRN   Or  acetaminophen, 650 mg, Q6H PRN  albuterol sulfate HFA, 2 puff, 4x Daily PRN        Labs      Recent Results (from the past 24 hour(s))   EKG 12 Lead    Collection Time: 09/09/22  4:23 PM   Result Value Ref Range    Ventricular Rate 66 BPM    Atrial Rate 66 BPM    P-R Interval 160 ms    QRS Duration 74 ms    Q-T Interval 430 ms    QTc Calculation (Bazett) 450 ms    P Axis 8 degrees    R Axis 44 degrees    T Axis 27 degrees    Diagnosis       Normal sinus rhythm  Low voltage QRS  Borderline ECG  When compared with ECG of 22-JUN-2022 15:13,  Minimal criteria for Anteroseptal infarct are no longer present  T wave inversion no longer evident in Anterior leads     CBC with Auto Differential    Collection Time: 09/09/22  4:25 PM   Result Value Ref Range    WBC 7.8 4.0 - 10.5 K/CU MM    RBC 4.13 (L) 4.6 - 6.2 M/CU MM    Hemoglobin 13.1 (L) 13.5 - 18.0 GM/DL    Hematocrit 40.1 (L) 42 - 52 %    MCV 97.1 78 - 100 FL    MCH 31.7 (H) 27 - 31 PG    MCHC 32.7 32.0 - 36.0 %    RDW 15.1 (H) 11.7 - 14.9 %    Platelets 676 774 - 589 K/CU MM    MPV 10.4 7.5 - 11.1 FL    Differential Type AUTOMATED DIFFERENTIAL     Segs Relative 72.3 (H) 36 - 66 %    Lymphocytes % 12.0 (L) 24 - 44 %    Monocytes % 11.8 (H) 0 - 4 %    Eosinophils % 3.2 (H) 0 - 3 %    Basophils % 0.6 0 - 1 %    Segs Absolute 5.7 K/CU MM    Lymphocytes Absolute 0.9 K/CU MM    Monocytes Absolute 0.9 K/CU MM    Eosinophils Absolute 0.3 K/CU MM    Basophils Absolute 0.1 K/CU MM    Nucleated RBC % 0.0 %    Total Nucleated RBC 0.0 K/CU MM    Total Immature Neutrophil 0.01 K/CU MM    Immature Neutrophil % 0.1 0 - 0.43 %   Comprehensive Metabolic Panel    Collection Time: 09/09/22  4:25 PM   Result Value Ref Range    Sodium 136 135 - 145 MMOL/L    Potassium 4.6 3.5 - 5.1 MMOL/L    Chloride 96 (L) 99 - 110 mMol/L    CO2 31 21 - 32 MMOL/L    BUN 43 (H) 6 - 23 MG/DL    Creatinine 2.0 (H) 0.9 - 1.3 MG/DL    Glucose 99 70 - 99 MG/DL    Calcium 9.9 8.3 - 10.6 MG/DL    Albumin 2.8 (L) 3.4 - 5.0 GM/DL    Total Protein 7.5 6.4 - 8.2 GM/DL    Total Bilirubin 0.7 0.0 - 1.0 MG/DL    ALT 39 10 - 40 U/L    AST 51 (H) 15 - 37 IU/L    Alkaline Phosphatase 107 40 - 129 IU/L    GFR Non- 33 (L) >60 mL/min/1.73m2    GFR  40 (L) >60 mL/min/1.73m2    Anion Gap 9 4 - 16   Magnesium    Collection Time: 09/09/22  4:25 PM   Result Value Ref Range    Magnesium 2.1 1.8 - 2.4 mg/dl   Brain Natriuretic Peptide    Collection Time: 09/09/22  4:25 PM   Result Value Ref Range    Pro-.5 <300 PG/ML   Troponin    Collection Time: 09/09/22  4:25 PM   Result Value Ref Range    Troponin T 0.017 (H) <0.01 NG/ML   Lipase    Collection Time: 09/09/22  6:10 PM   Result Value Ref Range    Lipase 48 13 - 60 IU/L        Imaging/Diagnostics Last 24 Hours   XR CHEST PORTABLE    Result Date: 9/9/2022  EXAMINATION: ONE XRAY VIEW OF THE CHEST 9/9/2022 4:57 pm COMPARISON: August 18, 2022 HISTORY: ORDERING SYSTEM PROVIDED HISTORY: Shortness of Breath TECHNOLOGIST PROVIDED HISTORY: Reason for exam:->Shortness of Breath Reason for Exam: sob, shaky FINDINGS: Whiteout redemonstrated of the right chest.  The left lung appears clear. Cardiomediastinal silhouette not appreciably changed. No pneumothorax or subdiaphragmatic free air. No acute osseous abnormality identified.      Large right pleural effusion with complete whiteout of the right chest.       Electronically signed by Erik Bethea MD on 9/10/2022 at 7:17 AM

## 2022-09-10 NOTE — CONSULTS
TONSILLECTOMY      UPPER GASTROINTESTINAL ENDOSCOPY N/A 1/19/2022    EGD BIOPSY performed by Pastora Erazo MD at [address] [place] [address]         Medications Prior to Admission:    Prior to Admission medications    Medication Sig Start Date End Date Taking? Authorizing Provider   albuterol sulfate HFA (VENTOLIN HFA) 108 (90 Base) MCG/ACT inhaler Inhale 2 puffs into the lungs 4 times daily as needed for Wheezing 7/26/22   Cali Sheldon MD   nadolol (CORGARD) 40 MG tablet Take 1 tablet by mouth in the morning. 7/20/22 8/26/22  MOHINDER Morley CNP   furosemide (LASIX) 40 MG tablet Take 1 tablet by mouth daily 7/12/22   MOHINDER Lui CNP   spironolactone (ALDACTONE) 50 MG tablet Take 1 tablet by mouth daily 7/12/22   MOHINDER Lui CNP   rifAXIMin (XIFAXAN) 550 MG tablet Take 1 tablet by mouth 2 times daily 7/12/22   MOHINDER Lui CNP   levothyroxine (SYNTHROID) 50 MCG tablet Take 1 tablet by mouth Daily 3/30/22 9/26/22  Cali Sheldon MD   lactulose [facility]) 10 GM/15ML solution Take 30 mLs by mouth 3 times daily Hold if having diarrhea 3/21/22   Kristin Perkins MD   DULoxetine (CYMBALTA) 60 MG extended release capsule TAKE 1 CAPSULE BY MOUTH ONCE DAILY 3/11/22 1/13/23  Cali Sheldon MD   Multiple Vitamin (MULTI VITAMIN MENS PO) Take 1 tablet by mouth daily    Historical Provider, MD   ESOMEPRAZOLE MAGNESIUM PO Take 20 mg by mouth daily OTC    Historical Provider, MD   Estelline-3 1000 MG CAPS Take 1 capsule by mouth daily    Historical Provider, MD       Allergies: Allergies   Allergen Reactions    Lisinopril Swelling     Tongue swelling      Zetia [Ezetimibe] Swelling     Facial swelling    Atorvastatin     Codeine Other (See Comments)     Blood pressure drops    Hydrochlorothiazide     Hydroxyzine      Fatigue and depressed    Lexapro [Escitalopram Oxalate]      Increased depression    Pravastatin    .     Social History:    TOBACCO:  No  ETOH: yes- but not for months    Family History: reviewed and positives included in HPI- all other pertinent GI family history negative      REVIEW OF SYSTEMS: see HPI for positives and pertinent negatives. All other systems reviewed and are negative    PHYSICAL EXAM:    Vitals:  /87   Pulse 64   Temp 98 °F (36.7 °C) (Oral)   Resp 16   Ht 5' 8\" (1.727 m)   Wt 235 lb 7.2 oz (106.8 kg)   SpO2 98%   BMI 35.80 kg/m²   CONSTITUTIONAL: alert, cooperative, no apparent distress,   EYES:  pupils equal, round and reactive to light and sclera clear  ENT:  normocepalic, without obvious abnormality  NECK:  supple, symmetrical, trachea midline  HEMATOLOGIC/LYMPHATICS:  no cervical lymphadenopathy and no supraclavicular lymphadenopathy  LUNGS:  clear to auscultation  CARDIOVASCULAR:  regular rate and rhythm and no murmur noted  ABDOMEN: distended with ascites but soft, non-tender with normal bowel sounds. 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), PSE-- MELD 13      RECOMMENDATIONS:  1) difficult scenario as apparently turned down for both TIPS and transplant  2) have no suggestions other than nephrology consult to help maximize safe diuresis (his creat is already 2.0), pulmonary consult for hepatic hydrothorax vs chylous effusion, and frequent large volume paracenteses  3) follow up at [address] [place] [address] hepatology as suggested-- although a Pleur-X drain is not usually suggested for control of non-malignant ascites (due to infection risk), that could be an exceptional consideration in this case  4) continue lactulose and Xifaxan        Alan L. Kriste Skiff M.D

## 2022-09-11 LAB
ALBUMIN SERPL-MCNC: 3.3 GM/DL (ref 3.4–5)
ALP BLD-CCNC: 123 IU/L (ref 40–129)
ALT SERPL-CCNC: 52 U/L (ref 10–40)
AMMONIA: 47 UMOL/L (ref 16–60)
ANION GAP SERPL CALCULATED.3IONS-SCNC: 5 MMOL/L (ref 4–16)
AST SERPL-CCNC: 76 IU/L (ref 15–37)
BASOPHILS ABSOLUTE: 0.1 K/CU MM
BASOPHILS RELATIVE PERCENT: 0.6 % (ref 0–1)
BILIRUB SERPL-MCNC: 0.7 MG/DL (ref 0–1)
BUN BLDV-MCNC: 35 MG/DL (ref 6–23)
CALCIUM SERPL-MCNC: 10 MG/DL (ref 8.3–10.6)
CHLORIDE BLD-SCNC: 99 MMOL/L (ref 99–110)
CO2: 35 MMOL/L (ref 21–32)
CREAT SERPL-MCNC: 1.5 MG/DL (ref 0.9–1.3)
DIFFERENTIAL TYPE: ABNORMAL
EOSINOPHILS ABSOLUTE: 0.3 K/CU MM
EOSINOPHILS RELATIVE PERCENT: 3.7 % (ref 0–3)
GFR AFRICAN AMERICAN: 55 ML/MIN/1.73M2
GFR NON-AFRICAN AMERICAN: 46 ML/MIN/1.73M2
GLUCOSE BLD-MCNC: 92 MG/DL (ref 70–99)
HCT VFR BLD CALC: 41.4 % (ref 42–52)
HEMOGLOBIN: 13.1 GM/DL (ref 13.5–18)
IMMATURE NEUTROPHIL %: 0.2 % (ref 0–0.43)
LYMPHOCYTES ABSOLUTE: 0.9 K/CU MM
LYMPHOCYTES RELATIVE PERCENT: 11.2 % (ref 24–44)
MAGNESIUM: 2 MG/DL (ref 1.8–2.4)
MCH RBC QN AUTO: 31.8 PG (ref 27–31)
MCHC RBC AUTO-ENTMCNC: 31.6 % (ref 32–36)
MCV RBC AUTO: 100.5 FL (ref 78–100)
MONOCYTES ABSOLUTE: 1 K/CU MM
MONOCYTES RELATIVE PERCENT: 12.2 % (ref 0–4)
NUCLEATED RBC %: 0 %
PDW BLD-RTO: 14.9 % (ref 11.7–14.9)
PHOSPHORUS: 3.3 MG/DL (ref 2.5–4.9)
PLATELET # BLD: 179 K/CU MM (ref 140–440)
PMV BLD AUTO: 10.7 FL (ref 7.5–11.1)
POTASSIUM SERPL-SCNC: 4.5 MMOL/L (ref 3.5–5.1)
RBC # BLD: 4.12 M/CU MM (ref 4.6–6.2)
SEGMENTED NEUTROPHILS ABSOLUTE COUNT: 5.8 K/CU MM
SEGMENTED NEUTROPHILS RELATIVE PERCENT: 72.1 % (ref 36–66)
SODIUM BLD-SCNC: 139 MMOL/L (ref 135–145)
TOTAL IMMATURE NEUTOROPHIL: 0.02 K/CU MM
TOTAL NUCLEATED RBC: 0 K/CU MM
TOTAL PROTEIN: 7.2 GM/DL (ref 6.4–8.2)
WBC # BLD: 8.1 K/CU MM (ref 4–10.5)

## 2022-09-11 PROCEDURE — 1200000000 HC SEMI PRIVATE

## 2022-09-11 PROCEDURE — 36415 COLL VENOUS BLD VENIPUNCTURE: CPT

## 2022-09-11 PROCEDURE — 82140 ASSAY OF AMMONIA: CPT

## 2022-09-11 PROCEDURE — 85025 COMPLETE CBC W/AUTO DIFF WBC: CPT

## 2022-09-11 PROCEDURE — 94640 AIRWAY INHALATION TREATMENT: CPT

## 2022-09-11 PROCEDURE — 94761 N-INVAS EAR/PLS OXIMETRY MLT: CPT

## 2022-09-11 PROCEDURE — 6360000002 HC RX W HCPCS: Performed by: INTERNAL MEDICINE

## 2022-09-11 PROCEDURE — 2580000003 HC RX 258: Performed by: INTERNAL MEDICINE

## 2022-09-11 PROCEDURE — 6370000000 HC RX 637 (ALT 250 FOR IP): Performed by: INTERNAL MEDICINE

## 2022-09-11 PROCEDURE — 84100 ASSAY OF PHOSPHORUS: CPT

## 2022-09-11 PROCEDURE — 83735 ASSAY OF MAGNESIUM: CPT

## 2022-09-11 PROCEDURE — 6370000000 HC RX 637 (ALT 250 FOR IP): Performed by: STUDENT IN AN ORGANIZED HEALTH CARE EDUCATION/TRAINING PROGRAM

## 2022-09-11 PROCEDURE — 80053 COMPREHEN METABOLIC PANEL: CPT

## 2022-09-11 PROCEDURE — 2700000000 HC OXYGEN THERAPY PER DAY

## 2022-09-11 RX ADMIN — FUROSEMIDE 40 MG: 40 TABLET ORAL at 18:35

## 2022-09-11 RX ADMIN — ALBUTEROL SULFATE 2 PUFF: 90 AEROSOL, METERED RESPIRATORY (INHALATION) at 02:58

## 2022-09-11 RX ADMIN — LACTULOSE 30 G: 10 SOLUTION ORAL at 06:29

## 2022-09-11 RX ADMIN — FUROSEMIDE 40 MG: 40 TABLET ORAL at 10:01

## 2022-09-11 RX ADMIN — LACTULOSE 30 G: 10 SOLUTION ORAL at 22:16

## 2022-09-11 RX ADMIN — RIFAXIMIN 550 MG: 550 TABLET ORAL at 20:53

## 2022-09-11 RX ADMIN — LACTULOSE 30 G: 10 SOLUTION ORAL at 18:35

## 2022-09-11 RX ADMIN — LACTULOSE 30 G: 10 SOLUTION ORAL at 00:15

## 2022-09-11 RX ADMIN — PANTOPRAZOLE SODIUM 40 MG: 40 TABLET, DELAYED RELEASE ORAL at 06:29

## 2022-09-11 RX ADMIN — SODIUM CHLORIDE, PRESERVATIVE FREE 10 ML: 5 INJECTION INTRAVENOUS at 10:02

## 2022-09-11 RX ADMIN — SODIUM CHLORIDE, PRESERVATIVE FREE 10 ML: 5 INJECTION INTRAVENOUS at 20:54

## 2022-09-11 RX ADMIN — SPIRONOLACTONE 50 MG: 50 TABLET ORAL at 10:01

## 2022-09-11 RX ADMIN — LACTULOSE 30 G: 10 SOLUTION ORAL at 14:02

## 2022-09-11 RX ADMIN — ENOXAPARIN SODIUM 30 MG: 100 INJECTION SUBCUTANEOUS at 10:01

## 2022-09-11 RX ADMIN — RIFAXIMIN 550 MG: 550 TABLET ORAL at 10:01

## 2022-09-11 RX ADMIN — NADOLOL 40 MG: 20 TABLET ORAL at 10:01

## 2022-09-11 RX ADMIN — DULOXETINE 60 MG: 30 CAPSULE, DELAYED RELEASE ORAL at 10:01

## 2022-09-11 RX ADMIN — LEVOTHYROXINE SODIUM 50 MCG: 0.05 TABLET ORAL at 06:29

## 2022-09-11 RX ADMIN — ENOXAPARIN SODIUM 30 MG: 100 INJECTION SUBCUTANEOUS at 20:53

## 2022-09-11 ASSESSMENT — PAIN SCALES - GENERAL
PAINLEVEL_OUTOF10: 0

## 2022-09-11 NOTE — PROGRESS NOTES
Physician Progress Note      PATIENT:               Mike Estrada  CSN #:                  [id number]  :                       1945  ADMIT DATE:       2022 5:37 PM  [address] [place] DATE:  RESPONDING  PROVIDER #:        Jenise Clement MD          QUERY TEXT:    Patient admitted with large right pleural effusion, noted to have cirrhosis an   past admission for pleural effusion. 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 He was not felt to be a candidate for   either TIPS or liver transplant. He was supposed to follow up with  hepatology   there but that has not apparently been arranged. \" documented in the consult   PN 9/10, CXR:Large right pleural effusion with complete whiteout of the right   chest. 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

## 2022-09-11 NOTE — PROGRESS NOTES
V2.0  [facility] [place]      Name:  Edgard Cain /Age/Sex: 1945  (78 y.o. male)   MRN & CSN:  [id number] & [id number] Encounter Date/Time: 2022 7:17 AM EDT    Location:  6974/98[facility]-V PCP: Adryan Ghosh, [address] Day: 3    Assessment and Plan:   Edgard Cain is a 78 y.o. male with PMH of alcoholic liver cirrhosis, portal hypertension, recurrent right-sided pleural effusion, recurrent ascites hypothyroidism and depression presented to ED with complaints of worsening shortness of breath. CXR shows large right pleural effusion with whiteout of the right chest. HD stable with O2 sat of 92% on RA. Labs were notable for BUN 43, Cr of 2 (bl 0.9-1.2), hemoglobin 13.1, platelets 853, troponin 0.017, albumin 2.8, AST 51, and lipase 48. Patient will likely require thoracentesis for symptomatic relief. Follow up with hepatology at [facility] has been advised in the past.    Plan:  #Recurrent right-sided pleural effusion s/p multiple thoracentesis  #Recurrent ascites s/p multiple paracentesis  #Alcoholic liver cirrhosis-diagnosed   #Portal hypertension   #H/O esophageal varices  2022  #History of hepatic encephalopathy  --Continue nadolol, spirinolactone and lasix  --Continue rifaximin, lactulose increased to 30g q6h in light of NH3 elevated to 143 yesterday morning, repeat NH3 is 47  --Patient follows with Dr. Saad Melara, consulted, appreciate recs  --Pulmonology consulted, will appreciate recs    #CANDIE on CKD  --Cr downtrending, could be 2/2 hepatorenal syndrome   --Continue diuresis, trend Cr, avoid nephrotoxins   --Nephrology consulted for recommendations on safely maximizing diuresis       #Hypothyroidism  --Continue levothyroxine     #Depression  --Continue duloxetine. #Obesity with BMI 36.64    Diet ADULT DIET; Regular;  Low Sodium (2 gm)   DVT Prophylaxis [x] Lovenox, []  Heparin, [] SCDs, [] Ambulation,  [] Eliquis, [] Xarelto  [] Coumadin   Code Status Full Code Disposition From: Home  Expected Disposition: Home  Estimated Date of Discharge: TBD  Patient requires continued admission due to Worsened dyspnea in the setting of recurrent pleural effusion and ascites requiring intervention   Surrogate Decision Maker/ POA      Subjective:     Chief Complaint: Shortness of Breath and Weight Gain (6lbs in 1 day)       Rosy Officer is a 78 y.o. male who presents with worsening shortness of breath and edema. He is examined at bedside, noted to be groggy, Sa0 on monitor reading 68, noted to be off NC. NC replaced and O2 uptitrated to 5l, with rapid recovery in saturations to 96%. With O2 saturations improved, he became more awake and responsive. He denied any pain or discomfort, he reports that he feels a little better and that he has had some bowel movements. Denies F/C/N/D, but does continue to endorse SOB. Review of Systems:    Review of Systems    Negative 10 point review system except as mentioned above    Objective: Intake/Output Summary (Last 24 hours) at 9/11/2022 0721  Last data filed at 9/10/2022 1359  Gross per 24 hour   Intake 200 ml   Output 800 ml   Net -600 ml          Vitals:   Vitals:    09/11/22 0230   BP: (!) 172/92   Pulse: 71   Resp: 23   Temp: 98.3 °F (36.8 °C)   SpO2: 100%       Physical Exam:   General: NAD, on O2 via NC, extremely [place], but pleasant, initially groggy due to Oz being off, quick recovery with replacement. Eyes: EOMI  HENT: NCAT, moist mucous membranes  Cardiovascular: Regular rate. Respiratory: Unable to appreciate breath sounds in the right hemispher, good air entry in the left  Gastrointestinal: Distended, non tender to palpation, BS appreciated  Genitourinary: no suprapubic tenderness  Musculoskeletal: Mildly pitting edema b/l, sensations intact  Skin: warm, dry  Neuro: Alert. Psych: Mood appropriate.      Medications:   Medications:    enoxaparin  30 mg SubCUTAneous BID    lactulose  30 g Oral 4 times per day    OLANZapine (ZyPREXA) in sterile water IntraMUSCular  2.5 mg IntraMUSCular Once    sodium chloride flush  5-40 mL IntraVENous 2 times per day    DULoxetine  60 mg Oral Daily    furosemide  40 mg Oral BID    levothyroxine  50 mcg Oral Daily    nadolol  40 mg Oral Daily    rifAXIMin  550 mg Oral BID    spironolactone  50 mg Oral Daily    pantoprazole  40 mg Oral QAM AC      Infusions:    sodium chloride       PRN Meds: sodium chloride flush, 5-40 mL, PRN  sodium chloride, , PRN  ondansetron, 4 mg, Q8H PRN   Or  ondansetron, 4 mg, Q6H PRN  polyethylene glycol, 17 g, Daily PRN  acetaminophen, 650 mg, Q6H PRN   Or  acetaminophen, 650 mg, Q6H PRN  albuterol sulfate HFA, 2 puff, 4x Daily PRN      Labs      Recent Results (from the past 24 hour(s))   Comprehensive Metabolic Panel    Collection Time: 09/10/22 12:44 PM   Result Value Ref Range    Sodium 136 135 - 145 MMOL/L    Potassium 4.4 3.5 - 5.1 MMOL/L    Chloride 98 (L) 99 - 110 mMol/L    CO2 30 21 - 32 MMOL/L    BUN 42 (H) 6 - 23 MG/DL    Creatinine 1.8 (H) 0.9 - 1.3 MG/DL    Glucose 107 (H) 70 - 99 MG/DL    Calcium 9.8 8.3 - 10.6 MG/DL    Albumin 3.0 (L) 3.4 - 5.0 GM/DL    Total Protein 6.6 6.4 - 8.2 GM/DL    Total Bilirubin 0.7 0.0 - 1.0 MG/DL    ALT 38 10 - 40 U/L    AST 49 (H) 15 - 37 IU/L    Alkaline Phosphatase 106 40 - 128 IU/L    GFR Non- 37 (L) >60 mL/min/1.73m2    GFR  45 (L) >60 mL/min/1.73m2    Anion Gap 8 4 - 16   CBC with Auto Differential    Collection Time: 09/10/22 12:44 PM   Result Value Ref Range    WBC 7.2 4.0 - 10.5 K/CU MM    RBC 4.03 (L) 4.6 - 6.2 M/CU MM    Hemoglobin 12.9 (L) 13.5 - 18.0 GM/DL    Hematocrit 39.4 (L) 42 - 52 %    MCV 97.8 78 - 100 FL    MCH 32.0 (H) 27 - 31 PG    MCHC 32.7 32.0 - 36.0 %    RDW 15.0 (H) 11.7 - 14.9 %    Platelets 075 942 - 932 K/CU MM    MPV 10.4 7.5 - 11.1 FL    Differential Type AUTOMATED DIFFERENTIAL     Segs Relative 69.7 (H) 36 - 66 %    Lymphocytes % 12.7 (L) 24 - 44 %    Monocytes % 13.1 (H) 0 - 4 %    Eosinophils % 3.4 (H) 0 - 3 %    Basophils % 0.8 0 - 1 %    Segs Absolute 5.0 K/CU MM    Lymphocytes Absolute 0.9 K/CU MM    Monocytes Absolute 0.9 K/CU MM    Eosinophils Absolute 0.2 K/CU MM    Basophils Absolute 0.1 K/CU MM    Nucleated RBC % 0.0 %    Total Nucleated RBC 0.0 K/CU MM    Total Immature Neutrophil 0.02 K/CU MM    Immature Neutrophil % 0.3 0 - 0.43 %   Magnesium    Collection Time: 09/10/22 12:44 PM   Result Value Ref Range    Magnesium 1.9 1.8 - 2.4 mg/dl   Phosphorus    Collection Time: 09/10/22 12:44 PM   Result Value Ref Range    Phosphorus 3.5 2.5 - 4.9 MG/DL   Ammonia    Collection Time: 09/10/22 12:44 PM   Result Value Ref Range    Ammonia 148 (H) 16 - 60 UMOL/L        Imaging/Diagnostics Last 24 Hours   XR CHEST PORTABLE    Result Date: 9/9/2022  EXAMINATION: ONE XRAY VIEW OF THE CHEST 9/9/2022 4:57 pm COMPARISON: August 18, 2022 HISTORY: ORDERING SYSTEM PROVIDED HISTORY: Shortness of Breath TECHNOLOGIST PROVIDED HISTORY: Reason for exam:->Shortness of Breath Reason for Exam: sob, shaky FINDINGS: Whiteout redemonstrated of the right chest.  The left lung appears clear. Cardiomediastinal silhouette not appreciably changed. No pneumothorax or subdiaphragmatic free air. No acute osseous abnormality identified.      Large right pleural effusion with complete whiteout of the right chest.       Electronically signed by Erik Bethea MD on 9/11/2022 at 7:21 AM

## 2022-09-11 NOTE — PLAN OF CARE
Problem: Discharge Planning  Goal: Discharge to home or other facility with appropriate resources  Outcome: Progressing     Problem: ABCDS Injury Assessment  Goal: Absence of physical injury  Outcome: Progressing     Problem: Safety - Adult  Goal: Free from fall injury  Outcome: Progressing     Problem: Neurosensory - Adult  Goal: Achieves stable or improved neurological status  Outcome: Progressing     Problem: Respiratory - Adult  Goal: Achieves optimal ventilation and oxygenation  Outcome: Progressing     Problem: Metabolic/Fluid and Electrolytes - Adult  Goal: Electrolytes maintained within normal limits  Outcome: Progressing  Goal: Hemodynamic stability and optimal renal function maintained  Outcome: Progressing     Problem: Hematologic - Adult  Goal: Maintains hematologic stability  Outcome: Progressing     Problem: Pain  Goal: Verbalizes/displays adequate comfort level or baseline comfort level  Outcome: Progressing

## 2022-09-12 ENCOUNTER — APPOINTMENT (OUTPATIENT)
Dept: INTERVENTIONAL RADIOLOGY/VASCULAR | Age: 77
DRG: 433 | End: 2022-09-12
Payer: MEDICARE

## 2022-09-12 LAB
ALBUMIN SERPL-MCNC: 3.1 GM/DL (ref 3.4–5)
ALP BLD-CCNC: 137 IU/L (ref 40–128)
ALT SERPL-CCNC: 60 U/L (ref 10–40)
ANION GAP SERPL CALCULATED.3IONS-SCNC: 7 MMOL/L (ref 4–16)
APTT: 34.8 SECONDS (ref 25.1–37.1)
AST SERPL-CCNC: 79 IU/L (ref 15–37)
BASOPHILS ABSOLUTE: 0.1 K/CU MM
BASOPHILS RELATIVE PERCENT: 0.8 % (ref 0–1)
BILIRUB SERPL-MCNC: 0.6 MG/DL (ref 0–1)
BUN BLDV-MCNC: 33 MG/DL (ref 6–23)
CALCIUM SERPL-MCNC: 10.2 MG/DL (ref 8.3–10.6)
CHLORIDE BLD-SCNC: 98 MMOL/L (ref 99–110)
CHLORIDE URINE RANDOM: 27 MMOL/L (ref 43–210)
CO2: 34 MMOL/L (ref 21–32)
CREAT SERPL-MCNC: 1.6 MG/DL (ref 0.9–1.3)
DIFFERENTIAL TYPE: ABNORMAL
EOSINOPHILS ABSOLUTE: 0.3 K/CU MM
EOSINOPHILS RELATIVE PERCENT: 3.9 % (ref 0–3)
FLUID TYPE: NORMAL INDEX
GFR AFRICAN AMERICAN: 51 ML/MIN/1.73M2
GFR NON-AFRICAN AMERICAN: 42 ML/MIN/1.73M2
GLUCOSE BLD-MCNC: 124 MG/DL (ref 70–99)
HCT VFR BLD CALC: 40.4 % (ref 42–52)
HEMOGLOBIN: 12.8 GM/DL (ref 13.5–18)
IMMATURE NEUTROPHIL %: 0.3 % (ref 0–0.43)
INR BLD: 0.99 INDEX
LYMPHOCYTES ABSOLUTE: 1.1 K/CU MM
LYMPHOCYTES RELATIVE PERCENT: 12.1 % (ref 24–44)
LYMPHOCYTES, BODY FLUID: 62 %
MAGNESIUM: 1.9 MG/DL (ref 1.8–2.4)
MCH RBC QN AUTO: 32 PG (ref 27–31)
MCHC RBC AUTO-ENTMCNC: 31.7 % (ref 32–36)
MCV RBC AUTO: 101 FL (ref 78–100)
MESOTHELIAL FLUID: 0 /100 WBC
MONOCYTE, FLUID: 28 %
MONOCYTES ABSOLUTE: 1.2 K/CU MM
MONOCYTES RELATIVE PERCENT: 13.6 % (ref 0–4)
NEUTROPHIL, FLUID: 10 %
NUCLEATED RBC %: 0 %
OTHER CELLS FLUID: 0
PDW BLD-RTO: 14.8 % (ref 11.7–14.9)
PHOSPHORUS: 3.8 MG/DL (ref 2.5–4.9)
PLATELET # BLD: 173 K/CU MM (ref 140–440)
PMV BLD AUTO: 10.7 FL (ref 7.5–11.1)
POTASSIUM SERPL-SCNC: 4.7 MMOL/L (ref 3.5–5.1)
POTASSIUM, UR: 37.5 MMOL/L (ref 22–119)
PROTHROMBIN TIME: 12.8 SECONDS (ref 11.7–14.5)
RBC # BLD: 4 M/CU MM (ref 4.6–6.2)
RBC FLUID: 69 /CU MM
SEGMENTED NEUTROPHILS ABSOLUTE COUNT: 6.1 K/CU MM
SEGMENTED NEUTROPHILS RELATIVE PERCENT: 69.3 % (ref 36–66)
SODIUM BLD-SCNC: 139 MMOL/L (ref 135–145)
SODIUM URINE: 17 MMOL/L (ref 35–167)
TOTAL IMMATURE NEUTOROPHIL: 0.03 K/CU MM
TOTAL NUCLEATED RBC: 0 K/CU MM
TOTAL PROTEIN: 6.9 GM/DL (ref 6.4–8.2)
WBC # BLD: 8.8 K/CU MM (ref 4–10.5)
WBC FLUID: 608 /CU MM

## 2022-09-12 PROCEDURE — 89051 BODY FLUID CELL COUNT: CPT

## 2022-09-12 PROCEDURE — 99223 1ST HOSP IP/OBS HIGH 75: CPT | Performed by: INTERNAL MEDICINE

## 2022-09-12 PROCEDURE — 36415 COLL VENOUS BLD VENIPUNCTURE: CPT

## 2022-09-12 PROCEDURE — 49083 ABD PARACENTESIS W/IMAGING: CPT

## 2022-09-12 PROCEDURE — 85730 THROMBOPLASTIN TIME PARTIAL: CPT

## 2022-09-12 PROCEDURE — 80053 COMPREHEN METABOLIC PANEL: CPT

## 2022-09-12 PROCEDURE — P9047 ALBUMIN (HUMAN), 25%, 50ML: HCPCS | Performed by: INTERNAL MEDICINE

## 2022-09-12 PROCEDURE — 2580000003 HC RX 258

## 2022-09-12 PROCEDURE — 82436 ASSAY OF URINE CHLORIDE: CPT

## 2022-09-12 PROCEDURE — 84300 ASSAY OF URINE SODIUM: CPT

## 2022-09-12 PROCEDURE — 2580000003 HC RX 258: Performed by: INTERNAL MEDICINE

## 2022-09-12 PROCEDURE — 6360000002 HC RX W HCPCS: Performed by: INTERNAL MEDICINE

## 2022-09-12 PROCEDURE — 84133 ASSAY OF URINE POTASSIUM: CPT

## 2022-09-12 PROCEDURE — 87070 CULTURE OTHR SPECIMN AEROBIC: CPT

## 2022-09-12 PROCEDURE — 6360000002 HC RX W HCPCS

## 2022-09-12 PROCEDURE — 6370000000 HC RX 637 (ALT 250 FOR IP): Performed by: STUDENT IN AN ORGANIZED HEALTH CARE EDUCATION/TRAINING PROGRAM

## 2022-09-12 PROCEDURE — 84100 ASSAY OF PHOSPHORUS: CPT

## 2022-09-12 PROCEDURE — 94761 N-INVAS EAR/PLS OXIMETRY MLT: CPT

## 2022-09-12 PROCEDURE — 6360000002 HC RX W HCPCS: Performed by: SPECIALIST

## 2022-09-12 PROCEDURE — 99231 SBSQ HOSP IP/OBS SF/LOW 25: CPT | Performed by: SPECIALIST

## 2022-09-12 PROCEDURE — 2700000000 HC OXYGEN THERAPY PER DAY

## 2022-09-12 PROCEDURE — 85025 COMPLETE CBC W/AUTO DIFF WBC: CPT

## 2022-09-12 PROCEDURE — 85610 PROTHROMBIN TIME: CPT

## 2022-09-12 PROCEDURE — P9047 ALBUMIN (HUMAN), 25%, 50ML: HCPCS

## 2022-09-12 PROCEDURE — 6370000000 HC RX 637 (ALT 250 FOR IP): Performed by: INTERNAL MEDICINE

## 2022-09-12 PROCEDURE — 0W9G3ZZ DRAINAGE OF PERITONEAL CAVITY, PERCUTANEOUS APPROACH: ICD-10-PCS | Performed by: RADIOLOGY

## 2022-09-12 PROCEDURE — 76937 US GUIDE VASCULAR ACCESS: CPT

## 2022-09-12 PROCEDURE — P9047 ALBUMIN (HUMAN), 25%, 50ML: HCPCS | Performed by: SPECIALIST

## 2022-09-12 PROCEDURE — 83735 ASSAY OF MAGNESIUM: CPT

## 2022-09-12 PROCEDURE — 1200000000 HC SEMI PRIVATE

## 2022-09-12 PROCEDURE — 87205 SMEAR GRAM STAIN: CPT

## 2022-09-12 RX ORDER — ALBUMIN (HUMAN) 12.5 G/50ML
25 SOLUTION INTRAVENOUS ONCE
Status: COMPLETED | OUTPATIENT
Start: 2022-09-12 | End: 2022-09-12

## 2022-09-12 RX ORDER — ALBUMIN (HUMAN) 12.5 G/50ML
25 SOLUTION INTRAVENOUS EVERY 8 HOURS
Status: DISCONTINUED | OUTPATIENT
Start: 2022-09-12 | End: 2022-09-14

## 2022-09-12 RX ADMIN — LACTULOSE 30 G: 10 SOLUTION ORAL at 06:31

## 2022-09-12 RX ADMIN — LACTULOSE 30 G: 10 SOLUTION ORAL at 23:54

## 2022-09-12 RX ADMIN — ALBUMIN (HUMAN) 25 G: 0.25 INJECTION, SOLUTION INTRAVENOUS at 09:50

## 2022-09-12 RX ADMIN — RIFAXIMIN 550 MG: 550 TABLET ORAL at 20:45

## 2022-09-12 RX ADMIN — SODIUM CHLORIDE, PRESERVATIVE FREE 10 ML: 5 INJECTION INTRAVENOUS at 09:55

## 2022-09-12 RX ADMIN — LACTULOSE 30 G: 10 SOLUTION ORAL at 12:14

## 2022-09-12 RX ADMIN — SODIUM CHLORIDE, PRESERVATIVE FREE 10 ML: 5 INJECTION INTRAVENOUS at 20:45

## 2022-09-12 RX ADMIN — DULOXETINE 60 MG: 30 CAPSULE, DELAYED RELEASE ORAL at 09:50

## 2022-09-12 RX ADMIN — FUROSEMIDE 40 MG: 40 TABLET ORAL at 17:52

## 2022-09-12 RX ADMIN — FUROSEMIDE 40 MG: 40 TABLET ORAL at 09:50

## 2022-09-12 RX ADMIN — ONDANSETRON 4 MG: 2 INJECTION INTRAMUSCULAR; INTRAVENOUS at 12:17

## 2022-09-12 RX ADMIN — SODIUM CHLORIDE, PRESERVATIVE FREE 10 ML: 5 INJECTION INTRAVENOUS at 12:18

## 2022-09-12 RX ADMIN — ENOXAPARIN SODIUM 30 MG: 100 INJECTION SUBCUTANEOUS at 20:45

## 2022-09-12 RX ADMIN — LACTULOSE 30 G: 10 SOLUTION ORAL at 17:52

## 2022-09-12 RX ADMIN — RIFAXIMIN 550 MG: 550 TABLET ORAL at 09:50

## 2022-09-12 RX ADMIN — NADOLOL 40 MG: 20 TABLET ORAL at 09:54

## 2022-09-12 RX ADMIN — LEVOTHYROXINE SODIUM 50 MCG: 0.05 TABLET ORAL at 06:31

## 2022-09-12 RX ADMIN — ALBUMIN (HUMAN) 25 G: 0.25 INJECTION, SOLUTION INTRAVENOUS at 23:50

## 2022-09-12 RX ADMIN — PANTOPRAZOLE SODIUM 40 MG: 40 TABLET, DELAYED RELEASE ORAL at 06:31

## 2022-09-12 RX ADMIN — ACETAMINOPHEN 650 MG: 325 TABLET ORAL at 20:45

## 2022-09-12 RX ADMIN — SPIRONOLACTONE 50 MG: 50 TABLET ORAL at 09:50

## 2022-09-12 ASSESSMENT — PAIN SCALES - GENERAL: PAINLEVEL_OUTOF10: 0

## 2022-09-12 NOTE — CONSULTS
Consult completed. Nexiva 20g 1.00 inch catheter inserted via ultrasound in patient's RFA. Brisk blood return noted and catheter flushes with ease. Patient tolerated well. Consult IV/PICC team for any questions or if patient's needs change.

## 2022-09-12 NOTE — CARE COORDINATION
CM in to see Pt to initiate discharge planning. Pt wife Sugey Godinez present. Pt very hard of hearing. Pt from home with his wife, has DME to include home o2 and a walker. Sugey Herrerae states that he is typically independent with ADL's and does not need the walker. Pt is active with [facility] for nursing, plan is to continue at discharge. PT/OT was discontinued. Wife denies need for therapy at this time. Pt has insurance, pcp, and can afford medications.      CM following for needs

## 2022-09-12 NOTE — PROGRESS NOTES
Patient returned to floor. Denies any pain or discomfort. Reported from IR patient had 3000ml removed from paracentesis and Albumin was given.

## 2022-09-12 NOTE — CONSULTS
Nephrology Service Consultation      [address], [address] [address]  Phone: [phone]  Office Hours: 8:30AM - 4:30PM  Monday - Friday        MEDICAL DECISION MAKING and Recommendations     -CANDIE; cr 1.8, it was 1 about a month ago  -Metabolic alkalosis  -Decompensated cirrhosis with ascites, hydrothorax  -Large right pleural effusion due to ascites/complete whiteout of the right chest    SUGgest;  -Hard to pinpoint the exact cause of the CANDIE, it is likely a combination of many factors I.e hemodynamics in the setting of decompesanted cirrhosis, volume depletion with diarrhea, diuretics use, needs for thoracentesis/paracenteses  -He has a metabolic alkalosis, could be compensatory or from intravascular volume depletion?  -I recommend avoiding further volume depletion by increasing his lasix dosages. I do not think it will prevent ascites and right pleural effusion accumulations  He might be better off with weekly paracenteses.  I do not know the liver txp prospects here but would TIPS procedure in his case?  -Avoid nephrotoxins  -Continue supportive mgmt  -Will follow    Thank you    Patient Active Problem List    Diagnosis Date Noted    Pleural effusion 09/09/2022    Hypersomnia 08/15/2022    Ex-smoker 08/15/2022    Obesity (BMI 30-39.9) 08/15/2022    Type 2 diabetes mellitus with chronic kidney disease 07/12/2022    Thyroid disease 06/21/2022    Cellulitis of left lower extremity 06/08/2022    Type 2 diabetes mellitus without complication, without long-term current use of insulin ([address] 06/08/2022    Generalized weakness     Oropharyngeal dysphagia     Acute kidney injury superimposed on chronic kidney disease ([address]     Acute respiratory failure ([address] 05/04/2022    Recurrent right pleural effusion 05/02/2022    Sepsis ([address] 03/19/2022    Hepatic encephalopathy ([address] 03/19/2022    Hearing loss 03/11/2022    Cirrhosis of liver with ascites ([address]     Secondary esophageal varices without bleeding ([address]     Gastric polyps     SOB (shortness of breath) 09/13/2021    Portal hypertension ([address] 08/30/2021    JENNIFER (obstructive sleep apnea) 08/09/2021    Bilateral hearing loss 05/07/2021    Increased ammonia level 05/07/2021    Class 3 severe obesity with body mass index (BMI) of 40.0 to 44.9 in adult [facility]) 09/21/2020    Hyperglycemia 12/04/2019    Acquired hypothyroidism 12/04/2019    Pulmonary nodules 09/09/2019    Ascites due to alcoholic cirrhosis ([facility] 33/24/8278    Mixed hyperlipidemia 08/08/2019    Hypertension 08/08/2019    History of alcohol abuse 08/08/2019    Gastroesophageal reflux disease 08/08/2019    Anxiety 08/08/2019    Shortness of breath 05/26/2019    History of colonic polyps 01/15/2019         Patient:  Shaw Watt  MRN: [id number]  Consulting physician:  Jesus Tavarez*  Reason for Consult: elevated creatinine  PCP: Rachel Mike MD    HISTORY OF PRESENT ILLNESS:   The patient is a 78 y.o. male with alcoholic cirrhosis presented with dyspnea. He was found to have a complete whiteout of the right chest due to pleural effusion  Renal consult for creatinine 1.8 from 1 about a month ago//Concerns about his lasix and the ascites, plerual effusion  He is on high dose lasix due to ascites from the cirrhosis  He is on NC this morning  Wife is at bedside    REVIEW OF SYSTEMS:  14 point ROS is Negative.  See positive ROS per HPI    Past Medical History:        Diagnosis Date    Anxiety     Cirrhosis, alcoholic ([facility]     Diabetes mellitus ([facility]     GERD (gastroesophageal reflux disease) 2000    GERD (gastroesophageal reflux disease)     Hyperlipidemia     Hypertension     Portal hypertension (HCC)     Pulmonary nodules     Sleep apnea     SOB (shortness of breath)     Thyroid disease        Past Surgical History:        Procedure Laterality Date    APPENDECTOMY      CHOLECYSTECTOMY      COLONOSCOPY      ENDOSCOPY, COLON, DIAGNOSTIC      FOOT SURGERY      needle removed,not sure which foot, per wife. TONSILLECTOMY      UPPER GASTROINTESTINAL ENDOSCOPY N/A 1/19/2022    EGD BIOPSY performed by Pastora Erazo MD at [address] [place] [address]         Medications:   Prior to Admission medications    Medication Sig Start Date End Date Taking? Authorizing Provider   albuterol sulfate HFA (VENTOLIN HFA) 108 (90 Base) MCG/ACT inhaler Inhale 2 puffs into the lungs 4 times daily as needed for Wheezing 7/26/22   Cali Sheldon MD   nadolol (CORGARD) 40 MG tablet Take 1 tablet by mouth in the morning. 7/20/22 8/26/22  MOHINDER Morley CNP   furosemide (LASIX) 40 MG tablet Take 1 tablet by mouth daily 7/12/22   MOHINDER Lui CNP   spironolactone (ALDACTONE) 50 MG tablet Take 1 tablet by mouth daily 7/12/22   MOHINDER Lui CNP   rifAXIMin (XIFAXAN) 550 MG tablet Take 1 tablet by mouth 2 times daily 7/12/22   MOHINDER Lui CNP   levothyroxine (SYNTHROID) 50 MCG tablet Take 1 tablet by mouth Daily 3/30/22 9/26/22  Cali Sheldon MD   lactulose [facility]) 10 GM/15ML solution Take 30 mLs by mouth 3 times daily Hold if having diarrhea 3/21/22   Kristin Perkins MD   DULoxetine (CYMBALTA) 60 MG extended release capsule TAKE 1 CAPSULE BY MOUTH ONCE DAILY 3/11/22 1/13/23  Cali Sheldon MD   Multiple Vitamin (MULTI VITAMIN MENS PO) Take 1 tablet by mouth daily    Historical Provider, MD   ESOMEPRAZOLE MAGNESIUM PO Take 20 mg by mouth daily OTC    Historical Provider, MD   [place]-3 1000 MG CAPS Take 1 capsule by mouth daily    Historical Provider, MD        Allergies:  Lisinopril, Zetia [ezetimibe], Atorvastatin, Codeine, Hydrochlorothiazide, Hydroxyzine, Lexapro [escitalopram oxalate], and Pravastatin    Social History:   TOBACCO:   reports that he quit smoking about 46 years ago. His smoking use included cigarettes. He has a 14.00 pack-year smoking history.  He has never used smokeless tobacco.  ETOH:   reports that he does not currently use alcohol. OCCUPATION:      Family History:       Problem Relation Age of Onset    Hypertension Brother     Diabetes Other      Physical Exam:    Vitals: BP (!) 126/91   Pulse 77   Temp 97.6 °F (36.4 °C) (Oral)   Resp 23   Ht 5' 8\" (1.727 m)   Wt 239 lb 10.2 oz (108.7 kg)   SpO2 100%   BMI 36.44 kg/m²   General appearance: in no acute distress, appears stated age  Skin: Skin color, texture, turgor normal. No rashes or lesions  HEENT: normocephalic, atraumatic  Neck: supple, trachea midline  Lungs: breathing comfortably on NC  Heart[de-identified] regular rate and rhythm, S1, S2 normal,  Abdomen: soft, non-tender; bowel sounds normal; no masses,   Extremities: extremities normal, atraumatic, no cyanosis or edema  Neurologic: Mental status: alert, oriented, interactive, following commands  Psychiatric: mood and affect appropriate     CBC:   Recent Labs     09/10/22  1244 09/11/22  1228 09/12/22  0317   WBC 7.2 8.1 8.8   HGB 12.9* 13.1* 12.8*    179 173     BMP:    Recent Labs     09/10/22  1244 09/11/22  1228 09/12/22  0317    139 139   K 4.4 4.5 4.7   CL 98* 99 98*   CO2 30 35* 34*   BUN 42* 35* 33*   CREATININE 1.8* 1.5* 1.6*   GLUCOSE 107* 92 124*     Hepatic:   Recent Labs     09/10/22  1244 09/11/22  1228 09/12/22  0317   AST 49* 76* 79*   ALT 38 52* 60*   BILITOT 0.7 0.7 0.6   ALKPHOS 106 123 137*     Troponin: No results for input(s): TROPONINI in the last 72 hours. BNP: No results for input(s): BNP in the last 72 hours. No intake/output data recorded.          Electronically signed by Rossana Orozco DO on 9/12/2022 at 7:56 AM    [facility]  MD Jaja Carrion DO [facility] [address],  [address]  [place] [id number]  PHONE: [phone]  FAX: [phone]

## 2022-09-12 NOTE — PROGRESS NOTES
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

## 2022-09-12 NOTE — CONSULTS
Pulmonary Consult Note      Reason for Consult: recurrent pleural effusion   Requesting Physician: Yanira Caraballo MD    Subjective:   CHIEF COMPLAINT :SOB    Patient Active Problem List    Diagnosis Date Noted    Pleural effusion 09/09/2022     Priority: Medium    Hypersomnia 08/15/2022     Priority: Medium    Ex-smoker 08/15/2022     Priority: Medium    Obesity (BMI 30-39.9) 08/15/2022     Priority: Medium    Type 2 diabetes mellitus with chronic kidney disease 07/12/2022     Priority: Medium    Thyroid disease 06/21/2022     Priority: Medium    Cellulitis of left lower extremity 06/08/2022     Priority: Medium    Type 2 diabetes mellitus without complication, without long-term current use of insulin ([address] 06/08/2022     Priority: Medium    Generalized weakness      Priority: Medium    Oropharyngeal dysphagia      Priority: Medium    Acute kidney injury superimposed on chronic kidney disease ([address]      Priority: Medium    Acute respiratory failure ([address] 05/04/2022     Priority: Medium    Recurrent right pleural effusion 05/02/2022     Priority: Medium    Sepsis ([address] 03/19/2022    Hepatic encephalopathy ([address] 03/19/2022    Hearing loss 03/11/2022    Cirrhosis of liver with ascites ([address]     Secondary esophageal varices without bleeding ([address]     Gastric polyps     SOB (shortness of breath) 09/13/2021    Portal hypertension ([address] 08/30/2021    JENNIFER (obstructive sleep apnea) 08/09/2021    Bilateral hearing loss 05/07/2021    Increased ammonia level 05/07/2021    Class 3 severe obesity with body mass index (BMI) of 40.0 to 44.9 in adult [facility]) 09/21/2020    Hyperglycemia 12/04/2019    Acquired hypothyroidism 12/04/2019    Pulmonary nodules 09/09/2019    Ascites due to alcoholic cirrhosis ([address] 56/74/3400    Mixed hyperlipidemia 08/08/2019    Hypertension 08/08/2019    History of alcohol abuse 08/08/2019    Gastroesophageal reflux disease 08/08/2019    Anxiety 08/08/2019    Shortness of breath 05/26/2019 History of colonic polyps 01/15/2019        HPI:                The patient is a 78 y.o. male with significant past medical history of   presents with complaints of SOB x 5 days. He had a CXR which showed white out. He also has distended belly. He had no fever, no chest pain, no n/v, no abd pain. He has h/o alcohol use with cirrhosis. He had paracentesis today in the morning, At this time he is sitting in the bed. He is in mild resp distress. Past Medical History:      Diagnosis Date    Anxiety     Cirrhosis, alcoholic ([address]     Diabetes mellitus ([facility]     GERD (gastroesophageal reflux disease) 2000    GERD (gastroesophageal reflux disease)     Hyperlipidemia     Hypertension     Portal hypertension (HCC)     Pulmonary nodules     Sleep apnea     SOB (shortness of breath)     Thyroid disease       Past Surgical History:        Procedure Laterality Date    APPENDECTOMY      CHOLECYSTECTOMY      COLONOSCOPY      ENDOSCOPY, COLON, DIAGNOSTIC      FOOT SURGERY      needle removed,not sure which foot, per wife. TONSILLECTOMY      UPPER GASTROINTESTINAL ENDOSCOPY N/A 1/19/2022    EGD BIOPSY performed by Shauna Pablo MD at [address] [place] [address]       Current Medications:     enoxaparin  30 mg SubCUTAneous BID    lactulose  30 g Oral 4 times per day    OLANZapine (ZyPREXA) in sterile water IntraMUSCular  2.5 mg IntraMUSCular Once    sodium chloride flush  5-40 mL IntraVENous 2 times per day    DULoxetine  60 mg Oral Daily    furosemide  40 mg Oral BID    levothyroxine  50 mcg Oral Daily    nadolol  40 mg Oral Daily    rifAXIMin  550 mg Oral BID    spironolactone  50 mg Oral Daily    pantoprazole  40 mg Oral QAM AC     Allergies:    Social History:    TOBACCO:   reports that he quit smoking about 46 years ago. His smoking use included cigarettes. He has a 14.00 pack-year smoking history. He has never used smokeless tobacco.  ETOH:   reports that he does not currently use alcohol.   Patient currently lives independently    Family History:       Problem Relation Age of Onset    Hypertension Brother     Diabetes Other        REVIEW OF SYSTEMS:    CONSTITUTIONAL:  negative for fevers, chills, diaphoresis, activity change, appetite change, fatigue, night sweats and unexpected weight change. EYES:  negative for blurred vision, eye discharge, visual disturbance and icterus  HEENT:  negative for hearing loss, tinnitus, ear drainage, sinus pressure, nasal congestion, epistaxis and snoring  RESPIRATORY:  See HPI  CARDIOVASCULAR:  negative for chest pain, palpitations, exertional chest pressure/discomfort, edema, syncope  GASTROINTESTINAL:  negative for nausea, vomiting, diarrhea, constipation, blood in stool and abdominal pain  GENITOURINARY:  negative for frequency, dysuria, urinary incontinence, decreased urine volume, and hematuria  HEMATOLOGIC/LYMPHATIC:  negative for easy bruising, bleeding and lymphadenopathy  ALLERGIC/IMMUNOLOGIC:  negative for recurrent infections, angioedema, anaphylaxis and drug reactions  ENDOCRINE:  negative for weight changes and diabetic symptoms including polyuria, polydipsia and polyphagia  MUSCULOSKELETAL:  negative for  pain, joint swelling, decreased range of motion and muscle weakness  NEUROLOGICAL:  negative for headaches, slurred speech, unilateral weakness  PSYCHIATRIC/BEHAVIORAL: negative for hallucinations, behavioral problems, confusion and agitation.      Objective:   PHYSICAL EXAM:      VITALS:  BP (!) 126/91   Pulse 77   Temp 97.6 °F (36.4 °C) (Oral)   Resp 23   Ht 5' 8\" (1.727 m)   Wt 239 lb 10.2 oz (108.7 kg)   SpO2 100%   BMI 36.44 kg/m²   24HR INTAKE/OUTPUT:  No intake or output data in the 24 hours ending 22 0655  CURRENT PULSE OXIMETRY:  SpO2: 100 %  24HR PULSE OXIMETRY RANGE:  SpO2  Av.7 %  Min: 94 %  Max: 100 %    CONSTITUTIONAL:  awake, alert, cooperative, no apparent distress, and appears stated age  NECK:  Supple, symmetrical, trachea midline, no adenopathy, thyroid symmetric, not enlarged and no tenderness, skin normal  LUNGS:  Decreased air entry right side  CARDIOVASCULAR:  normal S1 and S2, no edema and no JVD  ABDOMEN:  normal bowel sounds, non-distended and no masses palpated, and no tenderness to palpation. No hepatospleenomegaly  LYMPHADENOPATHY:  no axillary or supraclavicular adenopathy. No cervical adnenopathy  PSYCHIATRIC: Oriented to person place and time. No obvious depression or anxiety. MUSCULOSKELETAL: No obvious misalignment or effusion of the joints. No clubbing, cyanosis of the digits. SKIN:  normal skin color, texture, turgor and no redness, warmth, or swelling.  No palpable nodules    DATA:    Old records have been reviewed  CBC with Differential:    Lab Results   Component Value Date/Time    WBC 8.8 09/12/2022 03:17 AM    RBC 4.00 09/12/2022 03:17 AM    HGB 12.8 09/12/2022 03:17 AM    HCT 40.4 09/12/2022 03:17 AM     09/12/2022 03:17 AM    .0 09/12/2022 03:17 AM    MCH 32.0 09/12/2022 03:17 AM    MCHC 31.7 09/12/2022 03:17 AM    RDW 14.8 09/12/2022 03:17 AM    SEGSPCT 69.3 09/12/2022 03:17 AM    LYMPHOPCT 12.1 09/12/2022 03:17 AM    MONOPCT 13.6 09/12/2022 03:17 AM    BASOPCT 0.8 09/12/2022 03:17 AM    MONOSABS 1.2 09/12/2022 03:17 AM    LYMPHSABS 1.1 09/12/2022 03:17 AM    EOSABS 0.3 09/12/2022 03:17 AM    BASOSABS 0.1 09/12/2022 03:17 AM    DIFFTYPE AUTOMATED DIFFERENTIAL 09/12/2022 03:17 AM     BMP:    Lab Results   Component Value Date/Time     09/12/2022 03:17 AM    K 4.7 09/12/2022 03:17 AM    CL 98 09/12/2022 03:17 AM    CO2 34 09/12/2022 03:17 AM    BUN 33 09/12/2022 03:17 AM    CREATININE 1.6 09/12/2022 03:17 AM    CALCIUM 10.2 09/12/2022 03:17 AM    GFRAA 51 09/12/2022 03:17 AM    LABGLOM 42 09/12/2022 03:17 AM    GLUCOSE 124 09/12/2022 03:17 AM     Hepatic Function Panel:    Lab Results   Component Value Date/Time    ALKPHOS 137 09/12/2022 03:17 AM    ALT 60 09/12/2022 03:17 AM    AST 79 09/12/2022 03:17 AM    PROT 6.9 09/12/2022 03:17 AM    BILITOT 0.6 09/12/2022 03:17 AM    BILIDIR 0.2 07/15/2022 12:01 PM    IBILI 0.5 07/15/2022 12:01 PM     ABG:    Lab Results   Component Value Date/Time    WDO6KLL 38.1 06/23/2022 11:00 AM    EZB2WHX 63.0 06/23/2022 11:00 AM    PO2ART 73 06/23/2022 11:00 AM       Cultures:   Pending      Radiology Review:    Large right pleural effusion with complete whiteout of the right chest.                 Assessment/Plan       Patient Active Problem List    Diagnosis Date Noted    Pleural effusion 09/09/2022     Priority: Medium    Hypersomnia 08/15/2022     Priority: Medium    Ex-smoker 08/15/2022     Priority: Medium    Obesity (BMI 30-39.9) 08/15/2022     Priority: Medium    Type 2 diabetes mellitus with chronic kidney disease 07/12/2022     Priority: Medium    Thyroid disease 06/21/2022     Priority: Medium    Cellulitis of left lower extremity 06/08/2022     Priority: Medium    Type 2 diabetes mellitus without complication, without long-term current use of insulin ([address] 06/08/2022     Priority: Medium    Generalized weakness      Priority: Medium    Oropharyngeal dysphagia      Priority: Medium    Acute kidney injury superimposed on chronic kidney disease ([address]      Priority: Medium    Acute respiratory failure ([address] 05/04/2022     Priority: Medium    Recurrent right pleural effusion 05/02/2022     Priority: Medium    Sepsis ([address] 03/19/2022    Hepatic encephalopathy ([address] 03/19/2022    Hearing loss 03/11/2022    Cirrhosis of liver with ascites ([address]     Secondary esophageal varices without bleeding (HCC)     Gastric polyps     SOB (shortness of breath) 09/13/2021    Portal hypertension ([address] 08/30/2021    JENNIFER (obstructive sleep apnea) 08/09/2021    Bilateral hearing loss 05/07/2021    Increased ammonia level 05/07/2021    Class 3 severe obesity with body mass index (BMI) of 40.0 to 44.9 in adult [facility]) 09/21/2020    Hyperglycemia 12/04/2019    Acquired hypothyroidism 12/04/2019 Pulmonary nodules 09/09/2019    Ascites due to alcoholic cirrhosis ([facility] 76/34/7523    Mixed hyperlipidemia 08/08/2019    Hypertension 08/08/2019    History of alcohol abuse 08/08/2019    Gastroesophageal reflux disease 08/08/2019    Anxiety 08/08/2019    Shortness of breath 05/26/2019    History of colonic polyps 01/15/2019   CANDIE on CKD  Alcoholic Cirrhosis with Portal HTN  Recurrent ascites  Recurrent Right Pleural effusion sec to Hepatic Hydrothorax  Hypothyroidism  Obesity  JENNIFER    PLAN  F/u Ascitic fluid analysis  Diuresis  Lactulose  Right thoracentesis in am  Rifaxamin  Keep sats > 92%  CPAP qhs and prn  DVT and GI Prophylaxis  C/w present management          Electronically signed by César Cardozo MD on 9/12/2022 at 6:55 AM

## 2022-09-12 NOTE — PROGRESS NOTES
V2.0  [facility] [place] [facility] Hospitalist Progress Note      Name:  Rufus Smith /Age/Sex: 1945  (78 y.o. male)   MRN & CSN:  [id number] & [id number] Encounter Date/Time: 2022 7:17 AM EDT    Location:  [address] [place], [place] [address]-[place] PCP: Gila Ugarte, [address] M[address] Infante Day: 4    Assessment and Plan:   Rufus Smith is a 78 y.o. male with PMH of alcoholic liver cirrhosis, portal hypertension, recurrent right-sided pleural effusion, recurrent ascites hypothyroidism and depression presented to ED with complaints of worsening shortness of breath. CXR shows large right pleural effusion with whiteout of the right chest. HD stable with O2 sat of 92% on RA. Labs were notable for BUN 43, Cr of 2 (bl 0.9-1.2), hemoglobin 13.1, platelets 876, troponin 0.017, albumin 2.8, AST 51, and lipase 48. Patient will likely require thoracentesis for symptomatic relief. Follow up with hepatology at [facility] has been advised in the past.    Plan:  #Recurrent right-sided pleural effusion s/p multiple thoracentesis  #Recurrent ascites s/p multiple paracentesis  #Alcoholic liver cirrhosis-diagnosed   #Portal hypertension   #H/O esophageal varices  2022  #History of hepatic encephalopathy  --Continue nadolol, spirinolactone and lasix  --Continue rifaximin, lactulose, mentation is clearer  --Patient follows with Dr. Sara Tabares, consulted, appreciate recs  --Pulmonology consulted, will appreciate recs, possible thoracentesis    #CANDIE on CKD  --Cr downtrending, could be 2/2 hepatorenal syndrome   --Continue diuresis, trend Cr, avoid nephrotoxins   --Nephrology consulted for recommendations on safely maximizing diuresis       #Hypothyroidism  --Continue levothyroxine     #Depression  --Continue duloxetine. #Obesity with BMI 36.64    Diet ADULT DIET; Regular;  Low Sodium (2 gm)   DVT Prophylaxis [x] Lovenox, []  Heparin, [] SCDs, [] Ambulation,  [] Eliquis, [] Xarelto  [] Coumadin   Code Status Full Code   Disposition From: Home  Expected Oral Daily    pantoprazole  40 mg Oral QAM AC      Infusions:    sodium chloride       PRN Meds: sodium chloride flush, 5-40 mL, PRN  sodium chloride, , PRN  ondansetron, 4 mg, Q8H PRN   Or  ondansetron, 4 mg, Q6H PRN  polyethylene glycol, 17 g, Daily PRN  acetaminophen, 650 mg, Q6H PRN   Or  acetaminophen, 650 mg, Q6H PRN  albuterol sulfate HFA, 2 puff, 4x Daily PRN      Labs      Recent Results (from the past 24 hour(s))   Comprehensive Metabolic Panel    Collection Time: 09/11/22 12:28 PM   Result Value Ref Range    Sodium 139 135 - 145 MMOL/L    Potassium 4.5 3.5 - 5.1 MMOL/L    Chloride 99 99 - 110 mMol/L    CO2 35 (H) 21 - 32 MMOL/L    BUN 35 (H) 6 - 23 MG/DL    Creatinine 1.5 (H) 0.9 - 1.3 MG/DL    Glucose 92 70 - 99 MG/DL    Calcium 10.0 8.3 - 10.6 MG/DL    Albumin 3.3 (L) 3.4 - 5.0 GM/DL    Total Protein 7.2 6.4 - 8.2 GM/DL    Total Bilirubin 0.7 0.0 - 1.0 MG/DL    ALT 52 (H) 10 - 40 U/L    AST 76 (H) 15 - 37 IU/L    Alkaline Phosphatase 123 40 - 129 IU/L    GFR Non- 46 (L) >60 mL/min/1.73m2    GFR  55 (L) >60 mL/min/1.73m2    Anion Gap 5 4 - 16   CBC with Auto Differential    Collection Time: 09/11/22 12:28 PM   Result Value Ref Range    WBC 8.1 4.0 - 10.5 K/CU MM    RBC 4.12 (L) 4.6 - 6.2 M/CU MM    Hemoglobin 13.1 (L) 13.5 - 18.0 GM/DL    Hematocrit 41.4 (L) 42 - 52 %    .5 (H) 78 - 100 FL    MCH 31.8 (H) 27 - 31 PG    MCHC 31.6 (L) 32.0 - 36.0 %    RDW 14.9 11.7 - 14.9 %    Platelets 447 639 - 543 K/CU MM    MPV 10.7 7.5 - 11.1 FL    Differential Type AUTOMATED DIFFERENTIAL     Segs Relative 72.1 (H) 36 - 66 %    Lymphocytes % 11.2 (L) 24 - 44 %    Monocytes % 12.2 (H) 0 - 4 %    Eosinophils % 3.7 (H) 0 - 3 %    Basophils % 0.6 0 - 1 %    Segs Absolute 5.8 K/CU MM    Lymphocytes Absolute 0.9 K/CU MM    Monocytes Absolute 1.0 K/CU MM    Eosinophils Absolute 0.3 K/CU MM    Basophils Absolute 0.1 K/CU MM    Nucleated RBC % 0.0 %    Total Nucleated RBC 0.0 K/CU MM Total Immature Neutrophil 0.02 K/CU MM    Immature Neutrophil % 0.2 0 - 0.43 %   Magnesium    Collection Time: 09/11/22 12:28 PM   Result Value Ref Range    Magnesium 2.0 1.8 - 2.4 mg/dl   Phosphorus    Collection Time: 09/11/22 12:28 PM   Result Value Ref Range    Phosphorus 3.3 2.5 - 4.9 MG/DL   Ammonia    Collection Time: 09/11/22 12:28 PM   Result Value Ref Range    Ammonia 47 16 - 60 UMOL/L   Comprehensive Metabolic Panel    Collection Time: 09/12/22  3:17 AM   Result Value Ref Range    Sodium 139 135 - 145 MMOL/L    Potassium 4.7 3.5 - 5.1 MMOL/L    Chloride 98 (L) 99 - 110 mMol/L    CO2 34 (H) 21 - 32 MMOL/L    BUN 33 (H) 6 - 23 MG/DL    Creatinine 1.6 (H) 0.9 - 1.3 MG/DL    Glucose 124 (H) 70 - 99 MG/DL    Calcium 10.2 8.3 - 10.6 MG/DL    Albumin 3.1 (L) 3.4 - 5.0 GM/DL    Total Protein 6.9 6.4 - 8.2 GM/DL    Total Bilirubin 0.6 0.0 - 1.0 MG/DL    ALT 60 (H) 10 - 40 U/L    AST 79 (H) 15 - 37 IU/L    Alkaline Phosphatase 137 (H) 40 - 128 IU/L    GFR Non- 42 (L) >60 mL/min/1.73m2    GFR  51 (L) >60 mL/min/1.73m2    Anion Gap 7 4 - 16   CBC with Auto Differential    Collection Time: 09/12/22  3:17 AM   Result Value Ref Range    WBC 8.8 4.0 - 10.5 K/CU MM    RBC 4.00 (L) 4.6 - 6.2 M/CU MM    Hemoglobin 12.8 (L) 13.5 - 18.0 GM/DL    Hematocrit 40.4 (L) 42 - 52 %    .0 (H) 78 - 100 FL    MCH 32.0 (H) 27 - 31 PG    MCHC 31.7 (L) 32.0 - 36.0 %    RDW 14.8 11.7 - 14.9 %    Platelets 496 951 - 478 K/CU MM    MPV 10.7 7.5 - 11.1 FL    Differential Type AUTOMATED DIFFERENTIAL     Segs Relative 69.3 (H) 36 - 66 %    Lymphocytes % 12.1 (L) 24 - 44 %    Monocytes % 13.6 (H) 0 - 4 %    Eosinophils % 3.9 (H) 0 - 3 %    Basophils % 0.8 0 - 1 %    Segs Absolute 6.1 K/CU MM    Lymphocytes Absolute 1.1 K/CU MM    Monocytes Absolute 1.2 K/CU MM    Eosinophils Absolute 0.3 K/CU MM    Basophils Absolute 0.1 K/CU MM    Nucleated RBC % 0.0 %    Total Nucleated RBC 0.0 K/CU MM    Total Immature Neutrophil 0.03 K/CU MM    Immature Neutrophil % 0.3 0 - 0.43 %   Magnesium    Collection Time: 09/12/22  3:17 AM   Result Value Ref Range    Magnesium 1.9 1.8 - 2.4 mg/dl   Phosphorus    Collection Time: 09/12/22  3:17 AM   Result Value Ref Range    Phosphorus 3.8 2.5 - 4.9 MG/DL        Imaging/Diagnostics Last 24 Hours   XR CHEST PORTABLE    Result Date: 9/9/2022  EXAMINATION: ONE XRAY VIEW OF THE CHEST 9/9/2022 4:57 pm COMPARISON: August 18, 2022 HISTORY: ORDERING SYSTEM PROVIDED HISTORY: Shortness of Breath TECHNOLOGIST PROVIDED HISTORY: Reason for exam:->Shortness of Breath Reason for Exam: sob, shaky FINDINGS: Whiteout redemonstrated of the right chest.  The left lung appears clear. Cardiomediastinal silhouette not appreciably changed. No pneumothorax or subdiaphragmatic free air. No acute osseous abnormality identified.      Large right pleural effusion with complete whiteout of the right chest.       Electronically signed by Chuck Bowden MD on 9/12/2022 at 7:37 AM

## 2022-09-12 NOTE — OR NURSING
Paracentesis by Dr Jeniffer Grewal  Pt A&OX4; verbalized understanding of procedure  3 L removed; specimen sent to lab  Gauze with tegaderm applied to site

## 2022-09-13 ENCOUNTER — APPOINTMENT (OUTPATIENT)
Dept: GENERAL RADIOLOGY | Age: 77
DRG: 433 | End: 2022-09-13
Payer: MEDICARE

## 2022-09-13 ENCOUNTER — APPOINTMENT (OUTPATIENT)
Dept: INTERVENTIONAL RADIOLOGY/VASCULAR | Age: 77
DRG: 433 | End: 2022-09-13
Payer: MEDICARE

## 2022-09-13 LAB
ANION GAP SERPL CALCULATED.3IONS-SCNC: 10 MMOL/L (ref 4–16)
BUN BLDV-MCNC: 29 MG/DL (ref 6–23)
CALCIUM SERPL-MCNC: 9.7 MG/DL (ref 8.3–10.6)
CHLORIDE BLD-SCNC: 100 MMOL/L (ref 99–110)
CO2: 30 MMOL/L (ref 21–32)
CREAT SERPL-MCNC: 1.4 MG/DL (ref 0.9–1.3)
FLUID TYPE: NORMAL INDEX
GFR AFRICAN AMERICAN: 60 ML/MIN/1.73M2
GFR NON-AFRICAN AMERICAN: 50 ML/MIN/1.73M2
GLUCOSE BLD-MCNC: 191 MG/DL (ref 70–99)
LYMPHOCYTES, BODY FLUID: 50 %
MESOTHELIAL FLUID: 2 /100 WBC
MONOCYTE, FLUID: 50 %
NEUTROPHIL, FLUID: 0 %
OTHER CELLS FLUID: 0
POTASSIUM SERPL-SCNC: 4.3 MMOL/L (ref 3.5–5.1)
RBC FLUID: 30 /CU MM
SODIUM BLD-SCNC: 140 MMOL/L (ref 135–145)
WBC FLUID: 205 /CU MM

## 2022-09-13 PROCEDURE — 6370000000 HC RX 637 (ALT 250 FOR IP): Performed by: INTERNAL MEDICINE

## 2022-09-13 PROCEDURE — P9047 ALBUMIN (HUMAN), 25%, 50ML: HCPCS | Performed by: INTERNAL MEDICINE

## 2022-09-13 PROCEDURE — 99231 SBSQ HOSP IP/OBS SF/LOW 25: CPT | Performed by: SPECIALIST

## 2022-09-13 PROCEDURE — 99233 SBSQ HOSP IP/OBS HIGH 50: CPT | Performed by: INTERNAL MEDICINE

## 2022-09-13 PROCEDURE — 32555 ASPIRATE PLEURA W/ IMAGING: CPT | Performed by: INTERNAL MEDICINE

## 2022-09-13 PROCEDURE — 0W993ZZ DRAINAGE OF RIGHT PLEURAL CAVITY, PERCUTANEOUS APPROACH: ICD-10-PCS | Performed by: INTERNAL MEDICINE

## 2022-09-13 PROCEDURE — 6360000002 HC RX W HCPCS: Performed by: INTERNAL MEDICINE

## 2022-09-13 PROCEDURE — 92610 EVALUATE SWALLOWING FUNCTION: CPT

## 2022-09-13 PROCEDURE — 80048 BASIC METABOLIC PNL TOTAL CA: CPT

## 2022-09-13 PROCEDURE — 89051 BODY FLUID CELL COUNT: CPT

## 2022-09-13 PROCEDURE — 99024 POSTOP FOLLOW-UP VISIT: CPT | Performed by: INTERNAL MEDICINE

## 2022-09-13 PROCEDURE — 71045 X-RAY EXAM CHEST 1 VIEW: CPT

## 2022-09-13 PROCEDURE — 87070 CULTURE OTHR SPECIMN AEROBIC: CPT

## 2022-09-13 PROCEDURE — 49083 ABD PARACENTESIS W/IMAGING: CPT

## 2022-09-13 PROCEDURE — 36415 COLL VENOUS BLD VENIPUNCTURE: CPT

## 2022-09-13 PROCEDURE — 6370000000 HC RX 637 (ALT 250 FOR IP): Performed by: STUDENT IN AN ORGANIZED HEALTH CARE EDUCATION/TRAINING PROGRAM

## 2022-09-13 PROCEDURE — 32555 ASPIRATE PLEURA W/ IMAGING: CPT

## 2022-09-13 PROCEDURE — 94761 N-INVAS EAR/PLS OXIMETRY MLT: CPT

## 2022-09-13 PROCEDURE — 87205 SMEAR GRAM STAIN: CPT

## 2022-09-13 PROCEDURE — 1200000000 HC SEMI PRIVATE

## 2022-09-13 PROCEDURE — 2700000000 HC OXYGEN THERAPY PER DAY

## 2022-09-13 PROCEDURE — 2580000003 HC RX 258: Performed by: INTERNAL MEDICINE

## 2022-09-13 RX ORDER — FUROSEMIDE 40 MG/1
40 TABLET ORAL 2 TIMES DAILY
Status: DISCONTINUED | OUTPATIENT
Start: 2022-09-14 | End: 2022-09-14

## 2022-09-13 RX ORDER — SPIRONOLACTONE 50 MG/1
50 TABLET, FILM COATED ORAL DAILY
Status: DISCONTINUED | OUTPATIENT
Start: 2022-09-14 | End: 2022-09-14 | Stop reason: HOSPADM

## 2022-09-13 RX ADMIN — ALBUMIN (HUMAN) 25 G: 0.25 INJECTION, SOLUTION INTRAVENOUS at 17:07

## 2022-09-13 RX ADMIN — DULOXETINE 60 MG: 30 CAPSULE, DELAYED RELEASE ORAL at 09:15

## 2022-09-13 RX ADMIN — LACTULOSE 30 G: 10 SOLUTION ORAL at 06:16

## 2022-09-13 RX ADMIN — PANTOPRAZOLE SODIUM 40 MG: 40 TABLET, DELAYED RELEASE ORAL at 06:16

## 2022-09-13 RX ADMIN — ACETAMINOPHEN 650 MG: 325 TABLET ORAL at 21:47

## 2022-09-13 RX ADMIN — RIFAXIMIN 550 MG: 550 TABLET ORAL at 09:16

## 2022-09-13 RX ADMIN — SODIUM CHLORIDE, PRESERVATIVE FREE 10 ML: 5 INJECTION INTRAVENOUS at 21:47

## 2022-09-13 RX ADMIN — LEVOTHYROXINE SODIUM 50 MCG: 0.05 TABLET ORAL at 06:16

## 2022-09-13 RX ADMIN — ENOXAPARIN SODIUM 30 MG: 100 INJECTION SUBCUTANEOUS at 09:16

## 2022-09-13 RX ADMIN — ENOXAPARIN SODIUM 30 MG: 100 INJECTION SUBCUTANEOUS at 21:47

## 2022-09-13 RX ADMIN — RIFAXIMIN 550 MG: 550 TABLET ORAL at 21:47

## 2022-09-13 RX ADMIN — NADOLOL 40 MG: 20 TABLET ORAL at 09:15

## 2022-09-13 RX ADMIN — ALBUMIN (HUMAN) 25 G: 0.25 INJECTION, SOLUTION INTRAVENOUS at 09:22

## 2022-09-13 RX ADMIN — LACTULOSE 30 G: 10 SOLUTION ORAL at 18:05

## 2022-09-13 RX ADMIN — SODIUM CHLORIDE, PRESERVATIVE FREE 10 ML: 5 INJECTION INTRAVENOUS at 09:16

## 2022-09-13 RX ADMIN — LACTULOSE 30 G: 10 SOLUTION ORAL at 14:25

## 2022-09-13 RX ADMIN — ONDANSETRON 4 MG: 4 TABLET, ORALLY DISINTEGRATING ORAL at 01:48

## 2022-09-13 ASSESSMENT — PAIN SCALES - GENERAL: PAINLEVEL_OUTOF10: 3

## 2022-09-13 NOTE — PROGRESS NOTES
Speech Language Pathology  Facility/Department: [facility] SWALLOW EVALUATION    NAME: Gibson Seo  : 1945  MRN: [id number]    IMPRESSIONS AND RECOMMENDATIONS: Gibson Seo was referred for a bedside swallow evaluation following admission to [facility] with recurrent right-sided pleural effusion, recurrent ascites, CANDIE on CKD. Medical hx includes alcoholic liver cirrhosis, hepatic encephalopathy, hypothyroidism. Pt seen by SLP during admission in May 2022 and was discharged on regular diet/thin liquids. Pt seen for evaluation seated upright in bed, alert, confused, cooperative given cues. Family present throughout and assisted in providing case history. Pt/family deny any concerns for swallowing difficulty. Oral mechanism examination WFL without asymmetry. Pt presented with PO trials of thin liquids via cup/straw, puree, and regular solids. Oral stage grossly WFL with intact labial seal, slow mastication, adequate oral clearance for all consistencies. Pharyngeal stage appears [facility] with adequate swallow initiation/laryngeal elevation, no s/s aspiration across all trials. Recommend continued regular diet and thin liquids. No further acute SLP needs identified. Results/recommendations d/w pt and family who verbalized understanding/agreement. ADMISSION DATE: 2022  ADMITTING DIAGNOSIS: has Shortness of breath; History of colonic polyps; Ascites due to alcoholic cirrhosis ([address] Mixed hyperlipidemia; Hypertension; History of alcohol abuse; Gastroesophageal reflux disease; Anxiety; Pulmonary nodules; Hyperglycemia; Acquired hypothyroidism; Class 3 severe obesity with body mass index (BMI) of 40.0 to 44.9 in [facility]); Bilateral hearing loss; Increased ammonia level; JENNIFER (obstructive sleep apnea); Portal hypertension (HCC); SOB (shortness of breath); Cirrhosis of liver with ascites ([address] Secondary esophageal varices without bleeding ([address] Gastric polyps;  Hearing loss; Sepsis ([address] Hepatic encephalopathy ([address] Recurrent right pleural effusion; Acute respiratory failure ([address] Acute kidney injury superimposed on chronic kidney disease ([address] Generalized weakness; Oropharyngeal dysphagia; Cellulitis of left lower extremity; Type 2 diabetes mellitus without complication, without long-term current use of insulin ([address] Thyroid disease; Type 2 diabetes mellitus with chronic kidney disease; Hypersomnia; Ex-smoker; Obesity (BMI 30-39.9); and Recurrent pleural effusion on right on their problem list.  ONSET DATE: this admission    Recent Chest Xray/CT of Chest: see chart    Date of Eval: 9/13/2022  Evaluating Therapist: MARISELA Nix    Current Diet level:  Current Diet : Regular      Primary Complaint  Patient Complaint: denies current swallowing complaints    Pain:  Pain Assessment  Pain Assessment: None - Denies Pain  Pain Level: 0  Patient's Stated Pain Goal: 0 - No pain    Reason for Referral  Robby Gaitan was referred for a bedside swallow evaluation to assess the efficiency of his swallow function, identify signs and symptoms of aspiration and make recommendations regarding safe dietary consistencies, effective compensatory strategies, and safe eating environment. Impression  Dysphagia Diagnosis: Swallow function appears WFL;No clinical indicators of dysphagia  Dysphagia Outcome Severity Scale: Level 6: Within functional limits/Modified independence     Treatment Plan  Requires SLP Intervention: No  Duration of Treatment: N/A  D/C Recommendations: No follow up therapy recommended post discharge       Recommended Diet and Intervention        Recommended Form of Meds: PO          Compensatory Swallowing Strategies  Compensatory Swallowing Strategies : Upright as possible for all oral intake    Treatment/Goals  Short-term Goals  Timeframe for Short-term Goals: N/A    General  Chart Reviewed: Yes  Behavior/Cognition: Confused; Cooperative; Alert; Requires cueing  Respiratory Status: O2 via nasual cannula  O2 Device: Nasal cannula  Follows Directions: Simple  Dentition: Adequate  Patient Positioning: Upright in bed  Baseline Vocal Quality: Normal  Prior Dysphagia History: yes, acutely during prior admission May 2022  Consistencies Administered: Regular;Pureed; Thin           Vision/Hearing  Hearing  Hearing: Exceptions to [facility]  Hearing Exceptions: Hard of hearing/hearing concerns    Oral Motor Deficits  Oral/Motor  Oral Hygiene: Moist;Clean    Oral Phase Dysfunction  Oral Phase  Oral Phase: WFL     Indicators of Pharyngeal Phase Dysfunction   None    Prognosis  Individuals consulted  Consulted and agree with results and recommendations: Patient; Family member    Education  Patient Education: results, recommendations  Patient Education Response: Demonstrated understanding  Safety Devices in place: Yes  Type of devices: All fall risk precautions in place; Left in bed       Therapy Time  SLP Individual Minutes  Time In: 5176  Time Out: Heather 16  Minutes: [place], [address] Leighton Runkelen  9/13/2022 3:48 PM

## 2022-09-13 NOTE — PROGRESS NOTES
Nephrology Progress Note        [address] [address]  Phone: [phone]  Office Hours: 8:30AM - 4:30PM  Monday - Friday 9/13/2022 8:01 AM  Subjective:   Admit Date: 9/9/2022  PCP: Kasey Tran MD  Interval History: on nc    Diet: ADULT DIET; Regular; Low Sodium (2 gm); 2000 ml      Data:   Scheduled Meds:   [START ON 9/14/2022] furosemide  40 mg Oral BID    [START ON 9/14/2022] spironolactone  50 mg Oral Daily    albumin human  25 g IntraVENous Q8H    enoxaparin  30 mg SubCUTAneous BID    lactulose  30 g Oral 4 times per day    OLANZapine (ZyPREXA) in sterile water IntraMUSCular  2.5 mg IntraMUSCular Once    sodium chloride flush  5-40 mL IntraVENous 2 times per day    DULoxetine  60 mg Oral Daily    levothyroxine  50 mcg Oral Daily    nadolol  40 mg Oral Daily    rifAXIMin  550 mg Oral BID    pantoprazole  40 mg Oral QAM AC     Continuous Infusions:   sodium chloride       PRN Meds:sodium chloride flush, sodium chloride, ondansetron **OR** ondansetron, polyethylene glycol, acetaminophen **OR** acetaminophen, albuterol sulfate HFA  I/O last 3 completed shifts: In: 20 [I.V.:20]  Out: 250 [Urine:250]  No intake/output data recorded.     Intake/Output Summary (Last 24 hours) at 9/13/2022 0801  Last data filed at 9/12/2022 1218  Gross per 24 hour   Intake 20 ml   Output 250 ml   Net -230 ml       CBC:   Recent Labs     09/10/22  1244 09/11/22  1228 09/12/22  0317   WBC 7.2 8.1 8.8   HGB 12.9* 13.1* 12.8*    179 173       BMP:    Recent Labs     09/10/22  1244 09/11/22  1228 09/12/22  0317    139 139   K 4.4 4.5 4.7   CL 98* 99 98*   CO2 30 35* 34*   BUN 42* 35* 33*   CREATININE 1.8* 1.5* 1.6*   GLUCOSE 107* 92 124*     Hepatic:   Recent Labs     09/10/22  1244 09/11/22  1228 09/12/22  0317   AST 49* 76* 79*   ALT 38 52* 60*   BILITOT 0.7 0.7 0.6   ALKPHOS 106 123 137*           Objective:   Vitals: /72   Pulse 73   Temp 98 °F (36.7 °C) (Oral)

## 2022-09-13 NOTE — PLAN OF CARE
Problem: Discharge Planning  Goal: Discharge to home or other facility with appropriate resources  9/13/2022 1145 by Caleb Leblanc RN  Outcome: Progressing  9/13/2022 0303 by Gregory Keys RN  Outcome: Progressing     Problem: ABCDS Injury Assessment  Goal: Absence of physical injury  9/13/2022 1145 by Caleb Leblanc RN  Outcome: Progressing  9/13/2022 0303 by Gregory Keys RN  Outcome: Progressing     Problem: Safety - Adult  Goal: Free from fall injury  9/13/2022 1145 by Caleb Leblanc RN  Outcome: Progressing  9/13/2022 0303 by Gregory Keys RN  Outcome: Progressing     Problem: Neurosensory - Adult  Goal: Achieves stable or improved neurological status  9/13/2022 1145 by Caleb Leblanc RN  Outcome: Progressing  9/13/2022 0303 by Gregory Keys RN  Outcome: Progressing     Problem: Respiratory - Adult  Goal: Achieves optimal ventilation and oxygenation  9/13/2022 1145 by Caleb Leblanc RN  Outcome: Progressing  9/13/2022 0303 by Gregory Keys RN  Outcome: Progressing     Problem: Metabolic/Fluid and Electrolytes - Adult  Goal: Electrolytes maintained within normal limits  9/13/2022 1145 by Caleb Leblanc RN  Outcome: Progressing  9/13/2022 0303 by Gregory Keys RN  Outcome: Progressing  Goal: Hemodynamic stability and optimal renal function maintained  9/13/2022 1145 by Caleb Leblanc RN  Outcome: Progressing  9/13/2022 0303 by Gregory Keys RN  Outcome: Progressing     Problem: Hematologic - Adult  Goal: Maintains hematologic stability  9/13/2022 1145 by Caleb Leblanc RN  Outcome: Progressing  9/13/2022 0303 by Gregory Keys RN  Outcome: Progressing     Problem: Pain  Goal: Verbalizes/displays adequate comfort level or baseline comfort level  9/13/2022 1145 by Caleb Leblanc RN  Outcome: Progressing  9/13/2022 0303 by Gregory Keys RN  Outcome: Progressing

## 2022-09-13 NOTE — PROGRESS NOTES
V2.0  [facility] Hospitalist Progress Note      Name:  Zari Luis /Age/Sex: 1945  (78 y.o. male)   MRN & CSN:  [id number] & [id number] Encounter Date/Time: 2022 7:17 AM EDT    Location:  9461/0968-V PCP: Jaleesa Lockwood, Alberto Infante Day: 5    Assessment and Plan:   Zari Luis is a 78 y.o. male with PMH of alcoholic liver cirrhosis, portal hypertension, recurrent right-sided pleural effusion, recurrent ascites hypothyroidism and depression presented to ED with complaints of worsening shortness of breath. Plan:  #Recurrent right-sided pleural effusion s/p multiple thoracentesis  #Recurrent ascites s/p multiple paracentesis  #Alcoholic liver cirrhosis-diagnosed   #Portal hypertension   #H/O esophageal varices  2022  #History of hepatic encephalopathy  --Continue nadolol, spirinolactone and lasix  --Continue rifaximin, lactulose,   --Patient follows with Dr. Eliza Dominguez, consulted, Not a candidate for TIPS as well as transplant  -Underwent paracentesis as well as thoracentesis 2000 cc fluid removed    #CANDIE on CKD  --Cr downtrending,   --Continue diuresis, trend Cr, avoid nephrotoxins   --Nephrology on board      #Hypothyroidism  --Continue levothyroxine     #Depression  --Continue duloxetine. #Obesity with BMI 36.64    Diet ADULT DIET; Regular; Low Sodium (2 gm); 2000 ml   DVT Prophylaxis [x] Lovenox, []  Heparin, [] SCDs, [] Ambulation,  [] Eliquis, [] Xarelto  [] Coumadin   Code Status Full Code   Disposition From: Home  Expected Disposition: Home  Estimated Date of Discharge: 1-2 days  Patient requires continued admission due to Worsened dyspnea in the setting of recurrent pleural effusion and ascites requiring intervention   Surrogate Decision Maker/ POA      Subjective:     Chief Complaint: Shortness of Breath and Weight Gain (6lbs in 1 day)       Zari Luis is a 78 y.o. male who presents with worsening shortness of breath and edema.  He is examined at bedside, He was very drowsy in AM, was slow to respond, able to follow commands    Review of Systems:    Review of Systems    Unable to obtain extended ROS due to mentation    Objective: Intake/Output Summary (Last 24 hours) at 9/13/2022 1544  Last data filed at 9/13/2022 1000  Gross per 24 hour   Intake 180 ml   Output --   Net 180 ml          Vitals:   Vitals:    09/13/22 1423   BP: (!) 146/64   Pulse: 73   Resp: 21   Temp: 97.5 °F (36.4 °C)   SpO2: 100%       Physical Exam:   General: NAD, on O2 via NC, extremely [place], but pleasant, Eyes: EOMI  HENT: NCAT, moist mucous membranes  Cardiovascular: Regular rate. Respiratory: diminished air entry on entire R lung zones, Decreased air entry at bases on L side. Gastrointestinal: Distended, non tender to palpation, BS appreciated  Genitourinary: no suprapubic tenderness  Musculoskeletal: Mildly pitting edema b/l, sensations intact  Skin: warm, dry  Neuro: Alert and oriented to person  Psych: Mood appropriate.      Medications:   Medications:    [START ON 9/14/2022] furosemide  40 mg Oral BID    [START ON 9/14/2022] spironolactone  50 mg Oral Daily    albumin human  25 g IntraVENous Q8H    enoxaparin  30 mg SubCUTAneous BID    lactulose  30 g Oral 4 times per day    OLANZapine (ZyPREXA) in sterile water IntraMUSCular  2.5 mg IntraMUSCular Once    sodium chloride flush  5-40 mL IntraVENous 2 times per day    DULoxetine  60 mg Oral Daily    levothyroxine  50 mcg Oral Daily    nadolol  40 mg Oral Daily    rifAXIMin  550 mg Oral BID    pantoprazole  40 mg Oral QAM AC      Infusions:    sodium chloride       PRN Meds: sodium chloride flush, 5-40 mL, PRN  sodium chloride, , PRN  ondansetron, 4 mg, Q8H PRN   Or  ondansetron, 4 mg, Q6H PRN  polyethylene glycol, 17 g, Daily PRN  acetaminophen, 650 mg, Q6H PRN   Or  acetaminophen, 650 mg, Q6H PRN  albuterol sulfate HFA, 2 puff, 4x Daily PRN      Labs      Recent Results (from the past 24 hour(s))   Protime/INR & PTT    Collection Time: 09/12/22  4:26 PM   Result Value Ref Range    Protime 12.8 11.7 - 14.5 SECONDS    INR 0.99 INDEX    aPTT 34.8 25.1 - 37.1 SECONDS   Basic Metabolic Panel    Collection Time: 09/13/22 11:05 AM   Result Value Ref Range    Sodium 140 135 - 145 MMOL/L    Potassium 4.3 3.5 - 5.1 MMOL/L    Chloride 100 99 - 110 mMol/L    CO2 30 21 - 32 MMOL/L    Anion Gap 10 4 - 16    BUN 29 (H) 6 - 23 MG/DL    Creatinine 1.4 (H) 0.9 - 1.3 MG/DL    Glucose 191 (H) 70 - 99 MG/DL    Calcium 9.7 8.3 - 10.6 MG/DL    GFR Non-African American 50 (L) >60 mL/min/1.73m2    GFR  60 (L) >60 mL/min/1.73m2        Imaging/Diagnostics Last 24 Hours   XR CHEST PORTABLE    Result Date: 9/9/2022  EXAMINATION: ONE XRAY VIEW OF THE CHEST 9/9/2022 4:57 pm COMPARISON: August 18, 2022 HISTORY: ORDERING SYSTEM PROVIDED HISTORY: Shortness of Breath TECHNOLOGIST PROVIDED HISTORY: Reason for exam:->Shortness of Breath Reason for Exam: sob, shaky FINDINGS: Whiteout redemonstrated of the right chest.  The left lung appears clear. Cardiomediastinal silhouette not appreciably changed. No pneumothorax or subdiaphragmatic free air. No acute osseous abnormality identified.      Large right pleural effusion with complete whiteout of the right chest.       Electronically signed by Angel Correia MD on 9/13/2022 at 3:44 PM

## 2022-09-13 NOTE — OR NURSING
Right thoracentesis by Dr Regino Thomas    PT TRANSPORTED FROM:    3029                               TO THE IR ROOM:   Small       BARRIER PRECAUTIONS & STERILE TECHNIQUE:               Pt placed on VS Monitor. Pt prepped and draped in a sterile fashion with chlorhexadine.     PAIN/LOCAL ANESTHESIA/SEDATION MANAGEMENT:           Local: Lidocaine 1% given by Dr          Sedation: None             Fentanyl:             Versed:     INTRAOPERATIVE:           ACCESS TIME:           US/FLUORO: U/S           WIRE USED:           SHEATH USED:           CATHETER USED:           FINAL IMAGE TAKEN TO CONFIRM PLACEMENT OF:           CONTRAST/CC:     STERILE DRESSINGS: Gauze with tegaderm    SPECIMENS: Right pleural: 2000cc cloudy cream fluid removed; specimen sent to lab    EBL:   <5XP         COMPLICATIONS/ OUTCOME:   None        REPORT CALLED TO: No answer, unable to give report

## 2022-09-13 NOTE — PROGRESS NOTES
Pulmonary and Critical Care  Progress Note      VITALS:  /65   Pulse 72   Temp 97.8 °F (36.6 °C) (Oral)   Resp 20   Ht 5' 8\" (1.727 m)   Wt 239 lb 10.2 oz (108.7 kg)   SpO2 100%   PF (!) 1 L/min   BMI 36.44 kg/m²     Subjective:   CHIEF COMPLAINT :SOB     HPI:                The patient is a 78 y.o. male is sitting in the bed. He is in mild resp distress. He had right thoracentesis with reecurrence    Objective:   PHYSICAL EXAM:    LUNGS:Decrease air entry right side  And-distended, BS+,NT  Ext- 1 + pedal edema  CVS-s1s2, no murmurs      DATA:    CBC:  Recent Labs     09/10/22  1244 09/11/22  1228 09/12/22 0317   WBC 7.2 8.1 8.8   RBC 4.03* 4.12* 4.00*   HGB 12.9* 13.1* 12.8*   HCT 39.4* 41.4* 40.4*    179 173   MCV 97.8 100.5* 101.0*   MCH 32.0* 31.8* 32.0*   MCHC 32.7 31.6* 31.7*   RDW 15.0* 14.9 14.8   SEGSPCT 69.7* 72.1* 69.3*      BMP:  Recent Labs     09/11/22  1228 09/12/22 0317 09/13/22  1105    139 140   K 4.5 4.7 4.3   CL 99 98* 100   CO2 35* 34* 30   BUN 35* 33* 29*   CREATININE 1.5* 1.6* 1.4*   CALCIUM 10.0 10.2 9.7   GLUCOSE 92 124* 191*      ABG:  No results for input(s): PH, PO2ART, CUQ9AYW, HCO3, BEART, O2SAT in the last 72 hours. BNP  No results found for: BNP   D-Dimer:  Lab Results   Component Value Date    DDIMER 1606 (H) 09/13/2021      Radiology:   Redemonstration of near complete to complete opacification of the right   hemithorax, which may be due to underlying large right-sided pleural effusion   with new mediastinal shift to the left. No pneumothorax. Near complete opacification of the right hemithorax, minimally improved from   prior exam.       Mediastinal shift to the right is present, suggesting volume loss/atelectasis   of the right lung.        Assessment/Plan     Patient Active Problem List    Diagnosis Date Noted    Recurrent pleural effusion on right 09/09/2022     Priority: Medium    Hypersomnia 08/15/2022     Priority: Medium    Ex-smoker 08/15/2022     Priority: Medium    Obesity (BMI 30-39.9) 08/15/2022     Priority: Medium    Type 2 diabetes mellitus with chronic kidney disease 07/12/2022     Priority: Medium    Thyroid disease 06/21/2022     Priority: Medium    Cellulitis of left lower extremity 06/08/2022     Priority: Medium    Type 2 diabetes mellitus without complication, without long-term current use of insulin ([facility] 06/08/2022     Priority: Medium    Generalized weakness      Priority: Medium    Oropharyngeal dysphagia      Priority: Medium    Acute kidney injury superimposed on chronic kidney disease ([facility]      Priority: Medium    Acute respiratory failure ([address] 05/04/2022     Priority: Medium    Recurrent right pleural effusion 05/02/2022     Priority: Medium    Sepsis ([address] 03/19/2022    Hepatic encephalopathy ([facility] 03/19/2022    Hearing loss 03/11/2022    Cirrhosis of liver with ascites ([facility]     Secondary esophageal varices without bleeding (HCC)     Gastric polyps     SOB (shortness of breath) 09/13/2021    Portal hypertension ([facility] 08/30/2021    JENNIFER (obstructive sleep apnea) 08/09/2021    Bilateral hearing loss 05/07/2021    Increased ammonia level 05/07/2021    Class 3 severe obesity with body mass index (BMI) of 40.0 to 44.9 in adult ([facility] 09/21/2020    Hyperglycemia 12/04/2019    Acquired hypothyroidism 12/04/2019    Pulmonary nodules 09/09/2019    Ascites due to alcoholic cirrhosis ([facility] 17/67/3901    Mixed hyperlipidemia 08/08/2019    Hypertension 08/08/2019    History of alcohol abuse 08/08/2019    Gastroesophageal reflux disease 08/08/2019    Anxiety 08/08/2019    Shortness of breath 05/26/2019    History of colonic polyps 01/15/2019   CANDIE on CKD  Alcoholic Cirrhosis with Portal HTN  Recurrent ascites  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

## 2022-09-13 NOTE — PROGRESS NOTES
Had paracentesis and thoracentesis yesterday-- peritoneal fluid WBC not suggestive of SBP  Seen by nephrology -agree TIPS would help the ascites and hydrothorax however contraindicated by PSE (evaluated at [facility] for that).  They also felt he was not a transplant candidate  For now will continue periodic paracentesis as needed

## 2022-09-14 ENCOUNTER — APPOINTMENT (OUTPATIENT)
Dept: INTERVENTIONAL RADIOLOGY/VASCULAR | Age: 77
DRG: 433 | End: 2022-09-14
Payer: MEDICARE

## 2022-09-14 ENCOUNTER — APPOINTMENT (OUTPATIENT)
Dept: GENERAL RADIOLOGY | Age: 77
DRG: 433 | End: 2022-09-14
Payer: MEDICARE

## 2022-09-14 VITALS
SYSTOLIC BLOOD PRESSURE: 112 MMHG | HEART RATE: 74 BPM | TEMPERATURE: 98.6 F | DIASTOLIC BLOOD PRESSURE: 50 MMHG | OXYGEN SATURATION: 92 % | WEIGHT: 237.6 LBS | RESPIRATION RATE: 17 BRPM | HEIGHT: 68 IN | BODY MASS INDEX: 36.01 KG/M2

## 2022-09-14 LAB
ANION GAP SERPL CALCULATED.3IONS-SCNC: 6 MMOL/L (ref 4–16)
BUN BLDV-MCNC: 31 MG/DL (ref 6–23)
CALCIUM SERPL-MCNC: 10.2 MG/DL (ref 8.3–10.6)
CHLORIDE BLD-SCNC: 95 MMOL/L (ref 99–110)
CO2: 35 MMOL/L (ref 21–32)
CREAT SERPL-MCNC: 1.3 MG/DL (ref 0.9–1.3)
GFR AFRICAN AMERICAN: >60 ML/MIN/1.73M2
GFR NON-AFRICAN AMERICAN: 54 ML/MIN/1.73M2
GLUCOSE BLD-MCNC: 124 MG/DL (ref 70–99)
LACTATE DEHYDROGENASE: 198 IU/L (ref 120–246)
POTASSIUM SERPL-SCNC: 4.3 MMOL/L (ref 3.5–5.1)
SODIUM BLD-SCNC: 136 MMOL/L (ref 135–145)

## 2022-09-14 PROCEDURE — 32555 ASPIRATE PLEURA W/ IMAGING: CPT

## 2022-09-14 PROCEDURE — 99024 POSTOP FOLLOW-UP VISIT: CPT | Performed by: INTERNAL MEDICINE

## 2022-09-14 PROCEDURE — 83615 LACTATE (LD) (LDH) ENZYME: CPT

## 2022-09-14 PROCEDURE — 32555 ASPIRATE PLEURA W/ IMAGING: CPT | Performed by: INTERNAL MEDICINE

## 2022-09-14 PROCEDURE — 0W993ZZ DRAINAGE OF RIGHT PLEURAL CAVITY, PERCUTANEOUS APPROACH: ICD-10-PCS | Performed by: INTERNAL MEDICINE

## 2022-09-14 PROCEDURE — 36415 COLL VENOUS BLD VENIPUNCTURE: CPT

## 2022-09-14 PROCEDURE — 6370000000 HC RX 637 (ALT 250 FOR IP): Performed by: INTERNAL MEDICINE

## 2022-09-14 PROCEDURE — 6370000000 HC RX 637 (ALT 250 FOR IP): Performed by: STUDENT IN AN ORGANIZED HEALTH CARE EDUCATION/TRAINING PROGRAM

## 2022-09-14 PROCEDURE — 99233 SBSQ HOSP IP/OBS HIGH 50: CPT | Performed by: INTERNAL MEDICINE

## 2022-09-14 PROCEDURE — 2580000003 HC RX 258: Performed by: INTERNAL MEDICINE

## 2022-09-14 PROCEDURE — 6360000002 HC RX W HCPCS: Performed by: INTERNAL MEDICINE

## 2022-09-14 PROCEDURE — 94761 N-INVAS EAR/PLS OXIMETRY MLT: CPT

## 2022-09-14 PROCEDURE — P9047 ALBUMIN (HUMAN), 25%, 50ML: HCPCS | Performed by: INTERNAL MEDICINE

## 2022-09-14 PROCEDURE — 71045 X-RAY EXAM CHEST 1 VIEW: CPT

## 2022-09-14 PROCEDURE — 80048 BASIC METABOLIC PNL TOTAL CA: CPT

## 2022-09-14 RX ORDER — FUROSEMIDE 40 MG/1
40 TABLET ORAL 2 TIMES DAILY
Qty: 60 TABLET | Refills: 3 | Status: SHIPPED | OUTPATIENT
Start: 2022-09-15

## 2022-09-14 RX ORDER — FUROSEMIDE 40 MG/1
40 TABLET ORAL 2 TIMES DAILY
Status: DISCONTINUED | OUTPATIENT
Start: 2022-09-15 | End: 2022-09-14 | Stop reason: HOSPADM

## 2022-09-14 RX ADMIN — LACTULOSE 30 G: 10 SOLUTION ORAL at 10:23

## 2022-09-14 RX ADMIN — ALBUMIN (HUMAN) 25 G: 0.25 INJECTION, SOLUTION INTRAVENOUS at 10:29

## 2022-09-14 RX ADMIN — DULOXETINE 60 MG: 30 CAPSULE, DELAYED RELEASE ORAL at 10:23

## 2022-09-14 RX ADMIN — NADOLOL 40 MG: 20 TABLET ORAL at 10:23

## 2022-09-14 RX ADMIN — FUROSEMIDE 40 MG: 40 TABLET ORAL at 10:23

## 2022-09-14 RX ADMIN — LACTULOSE 30 G: 10 SOLUTION ORAL at 06:25

## 2022-09-14 RX ADMIN — SODIUM CHLORIDE, PRESERVATIVE FREE 10 ML: 5 INJECTION INTRAVENOUS at 10:24

## 2022-09-14 RX ADMIN — LACTULOSE 30 G: 10 SOLUTION ORAL at 00:21

## 2022-09-14 RX ADMIN — PANTOPRAZOLE SODIUM 40 MG: 40 TABLET, DELAYED RELEASE ORAL at 06:25

## 2022-09-14 RX ADMIN — RIFAXIMIN 550 MG: 550 TABLET ORAL at 10:23

## 2022-09-14 RX ADMIN — LEVOTHYROXINE SODIUM 50 MCG: 0.05 TABLET ORAL at 06:24

## 2022-09-14 RX ADMIN — SPIRONOLACTONE 50 MG: 50 TABLET ORAL at 10:23

## 2022-09-14 RX ADMIN — ALBUMIN (HUMAN) 25 G: 0.25 INJECTION, SOLUTION INTRAVENOUS at 00:21

## 2022-09-14 NOTE — PROCEDURES
Right thoracentesis by Dr Umu Napoles @ bedside                    ARRIVED TO ROOM: 1110    ASSESSMENT: Pt confused; wife @ bedside    STAFF PRESENT: Janelle Frank RN    BARRIER PRECAUTIONS & STERILE TECHNIQUE:               Pt prepped and draped in a sterile fashion with chlorhexadine.     TIME OUT:  1115    PROCEDURE STARTED: 1116    PAIN/LOCAL ANESTHESIA/SEDATION MANAGEMENT:           Local: Lidocaine 1% given by Dr          Sedation: None             Fentanyl:             Versed:     INTRAOPERATIVE:           ACCESS TIME:           US/FLUORO: U/S guided          WIRE USED:           SHEATH USED:           CATHETER USED:           FINAL IMAGE TAKEN TO CONFIRM PLACEMENT OF:           CONTRAST/CC:     STERILE DRESSINGS: Gauze with tegaderm    SPECIMENS: Right pleural: 2100cc cloudy cream colored fluid removed; no orders to send    EBL:     <1cc     FOLLOW- UP X-RAY: ordered    PROCEDURE ENDED: 8102    COMPLICATIONS/ OUTCOME:None        LEFT THE ROOM: 1145    REPORT CALLED TO: [address] [facility], pt nurse

## 2022-09-14 NOTE — PLAN OF CARE
Problem: Neurosensory - Adult  Goal: Achieves stable or improved neurological status  9/13/2022 2141 by Osvaldo Guy  Outcome: Not Progressing  9/13/2022 1145 by Corrine Valverde RN  Outcome: Progressing     Problem: Pain  Goal: Verbalizes/displays adequate comfort level or baseline comfort level  9/13/2022 2141 by Osvaldo Guy  Outcome: Not Progressing  9/13/2022 1145 by Corrine Valverde RN  Outcome: Progressing

## 2022-09-14 NOTE — PROCEDURES
Fauzia Gee is a 78 y.o. male patient. 1. Recurrent pleural effusion on right    2. Ascites due to alcoholic cirrhosis ([facility]    3. Dyspnea and respiratory abnormalities    4. CANDIE (acute kidney injury) ([facility]      Past Medical History:   Diagnosis Date    Anxiety     Cirrhosis, alcoholic ([facility]     Diabetes mellitus ([facility]     GERD (gastroesophageal reflux disease) 2000    GERD (gastroesophageal reflux disease)     Hyperlipidemia     Hypertension     Portal hypertension (HCC)     Pulmonary nodules     Sleep apnea     SOB (shortness of breath)     Thyroid disease      Blood pressure (!) 106/59, pulse 78, temperature 98.4 °F (36.9 °C), temperature source Oral, resp. rate 16, height 5' 8\" (1.727 m), weight 239 lb 10.2 oz (108.7 kg), SpO2 94 %, peak flow (!) 1 L/min. Thoracentesis    Date/Time: 9/13/2022 9:50 PM  Performed by: Lori Castanon MD  Authorized by: Lori Castanon MD   Consent: Verbal consent obtained. Written consent obtained. Risks and benefits: risks, benefits and alternatives were discussed  Consent given by: patient  Patient understanding: patient states understanding of the procedure being performed  Patient consent: the patient's understanding of the procedure matches consent given  Procedure consent: procedure consent matches procedure scheduled  Relevant documents: relevant documents present and verified  Test results: test results available and properly labeled  Site marked: the operative site was marked  Imaging studies: imaging studies available  Patient identity confirmed: verbally with patient and arm band  Time out: Immediately prior to procedure a \"time out\" was called to verify the correct patient, procedure, equipment, support staff and site/side marked as required.   Body area: trunk  Patient tolerance: patient tolerated the procedure well with no immediate complications    3287 ml of milky fluid was drained  Pleural fluid analysis with gram stain, C&S, Cell count,Chylomicron, Cholesterol  CXR STAT    Kaykay Childress MD  9/13/2022

## 2022-09-14 NOTE — PROGRESS NOTES
Pulmonary and Critical Care  Progress Note      VITALS:  BP (!) 112/50   Pulse 75   Temp 98.6 °F (37 °C) (Oral)   Resp 17   Ht 5' 8\" (1.727 m)   Wt 237 lb 9.6 oz (107.8 kg)   SpO2 94%   PF (!) 1 L/min   BMI 36.13 kg/m²     Subjective:   CHIEF COMPLAINT :SOB     HPI:                The patient is a 78 y.o. male is sitting in the bed. He is in mild resp distress    Objective:   PHYSICAL EXAM:    LUNGS:Decrease air entry right side  And-distended, BS+,NT  Ext- 1 + pedal edema  CVS-s1s2, no murmurs      DATA:    CBC:  Recent Labs     09/11/22  1228 09/12/22  0317   WBC 8.1 8.8   RBC 4.12* 4.00*   HGB 13.1* 12.8*   HCT 41.4* 40.4*    173   .5* 101.0*   MCH 31.8* 32.0*   MCHC 31.6* 31.7*   RDW 14.9 14.8   SEGSPCT 72.1* 69.3*      BMP:  Recent Labs     09/12/22  0317 09/13/22  1105 09/14/22  0912    140 136   K 4.7 4.3 4.3   CL 98* 100 95*   CO2 34* 30 35*   BUN 33* 29* 31*   CREATININE 1.6* 1.4* 1.3   CALCIUM 10.2 9.7 10.2   GLUCOSE 124* 191* 124*      ABG:  No results for input(s): PH, PO2ART, OZU2VQP, HCO3, BEART, O2SAT in the last 72 hours. BNP  No results found for: BNP   D-Dimer:  Lab Results   Component Value Date    DDIMER 1606 (H) 09/13/2021      Radiology:   No pneumothorax. Near complete opacification of the right hemithorax, minimally improved from   prior exam.       Mediastinal shift to the right is present, suggesting volume loss/atelectasis   of the right lung.          Assessment/Plan     Patient Active Problem List    Diagnosis Date Noted    Recurrent pleural effusion on right 09/09/2022     Priority: Medium    Hypersomnia 08/15/2022     Priority: Medium    Ex-smoker 08/15/2022     Priority: Medium    Obesity (BMI 30-39.9) 08/15/2022     Priority: Medium    Type 2 diabetes mellitus with chronic kidney disease 07/12/2022     Priority: Medium    Thyroid disease 06/21/2022     Priority: Medium    Cellulitis of left lower extremity 06/08/2022     Priority: Medium    Type 2 diabetes mellitus without complication, without long-term current use of insulin ([address] 06/08/2022     Priority: Medium    Generalized weakness      Priority: Medium    Oropharyngeal dysphagia      Priority: Medium    Acute kidney injury superimposed on chronic kidney disease ([address]      Priority: Medium    Acute respiratory failure ([address] 05/04/2022     Priority: Medium    Recurrent right pleural effusion 05/02/2022     Priority: Medium    Sepsis ([address] 03/19/2022    Hepatic encephalopathy ([address] 03/19/2022    Hearing loss 03/11/2022    Cirrhosis of liver with ascites ([address]     Secondary esophageal varices without bleeding (HCC)     Gastric polyps     SOB (shortness of breath) 09/13/2021    Portal hypertension ([address] 08/30/2021    JENNIFER (obstructive sleep apnea) 08/09/2021    Bilateral hearing loss 05/07/2021    Increased ammonia level 05/07/2021    Class 3 severe obesity with body mass index (BMI) of 40.0 to 44.9 in adult ([address] 09/21/2020    Hyperglycemia 12/04/2019    Acquired hypothyroidism 12/04/2019    Pulmonary nodules 09/09/2019    Ascites due to alcoholic cirrhosis ([address] 40/98/7610    Mixed hyperlipidemia 08/08/2019    Hypertension 08/08/2019    History of alcohol abuse 08/08/2019    Gastroesophageal reflux disease 08/08/2019    Anxiety 08/08/2019    Shortness of breath 05/26/2019    History of colonic polyps 01/15/2019     CANDIE on CKD  Alcoholic Cirrhosis with Portal HTN  Recurrent ascites  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

## 2022-09-14 NOTE — DISCHARGE SUMMARY
V2.0  Discharge Summary    Name:  Ashlie Katz /Age/Sex: 1945 (33 y.o. male)   Admit Date: 2022  Discharge Date: 22    MRN & CSN:  [id number] & [id number] Encounter Date and Time 22 1:23 PM EDT    Attending:  Karuna Butcher MD Discharging Provider: Karuna Butcher MD       Hospital Course:     Brief HPI: Ashlie Katz is a 78 y.o. male with alcoholic liver cirrhosis, portal hypertension, recurrent right-sided pleural effusion, hypothyroidism, depression presented to ED with complaints of worsening shortness of breath. According to her patient had recent paracentesis and thoracentesis. Patient again developed abdominal distention, feeling more short of breath with minimal activity recently. She denied patient having any fever, chills, cough, denied any urinary complaints, denied any constipation or diarrhea. Brief Problem Based Course:     #Recurrent right-sided pleural effusion s/p multiple thoracentesis  #Recurrent ascites s/p multiple paracentesis  #Alcoholic liver cirrhosis-diagnosed   #Portal hypertension   #H/O esophageal varices  2022  #History of hepatic encephalopathy  --Continue nadolol, spirinolactone and lasix  --Continue rifaximin, lactulose,   --Patient follows with Dr. Earl Temple, consulted, Not a candidate for TIPS as well as transplant  -Underwent paracentesis as well as thoracentesis 2000 cc fluid removed on  and 2100 cc removed on     He was advised to follow with pulmonology in 2 weeks. #CANDIE on CKD  --Cr downtrending,   --Continue diuresis, trend Cr, avoid nephrotoxins   --Nephrology on board      The patient expressed appropriate understanding of, and agreement with the discharge recommendations, medications, and plan.      Consults this admission:  IP CONSULT TO HOSPITALIST  IP CONSULT TO GI  IP CONSULT TO NEPHROLOGY  IP CONSULT TO PULMONOLOGY  IP CONSULT TO INTERVENTIONAL RADIOLOGY  IP CONSULT TO IV TEAM    Discharge Diagnosis:   Recurrent pleural effusion on right 2/2 cirrhosis    Discharge Instruction:   Follow up appointments:   Primary care physician: Christopher Bryant MD within 2 weeks  Diet: cardiac diet   Activity: activity as tolerated  Disposition: Discharged to:   [x]Home, [][facility], []SNF, []Acute Rehab, []Hospice   Condition on discharge: Stable  Labs and Tests to be Followed up as an outpatient by PCP or Specialist:     Discharge Medications:        Medication List        CHANGE how you take these medications      furosemide 40 MG tablet  Commonly known as: LASIX  Take 1 tablet by mouth in the morning and 1 tablet in the evening. Start taking on: September 15, 2022  What changed: when to take this            CONTINUE taking these medications      albuterol sulfate  (90 Base) MCG/ACT inhaler  Commonly known as: Ventolin HFA  Inhale 2 puffs into the lungs 4 times daily as needed for Wheezing     DULoxetine 60 MG extended release capsule  Commonly known as: CYMBALTA  TAKE 1 CAPSULE BY MOUTH ONCE DAILY     ESOMEPRAZOLE MAGNESIUM PO     lactulose 10 GM/15ML solution  Commonly known as: CHRONULAC  Take 30 mLs by mouth 3 times daily Hold if having diarrhea     levothyroxine 50 MCG tablet  Commonly known as: SYNTHROID  Take 1 tablet by mouth Daily     MULTI VITAMIN MENS PO     nadolol 40 MG tablet  Commonly known as: CORGARD  Take 1 tablet by mouth in the morning.      Omega-3 1000 MG Caps     rifAXIMin 550 MG tablet  Commonly known as: XIFAXAN  Take 1 tablet by mouth 2 times daily     spironolactone 50 MG tablet  Commonly known as: ALDACTONE  Take 1 tablet by mouth daily               Where to Get Your Medications        These medications were sent to [address] [place] [address], [address]       Phone: [phone]   furosemide 40 MG tablet        Objective Findings at Discharge:   BP (!) 112/50   Pulse 74   Temp 98.6 °F (37 °C) (Oral)   Resp 17   Ht 5' 8\" (1.727 m)   Wt 237 lb 9.6 oz (107.8 kg)   SpO2 92%   PF (!) 1 L/min   BMI 36.13 kg/m²       Physical Exam:   Physical Exam         Labs and Imaging   XR CHEST PORTABLE    Result Date: 9/9/2022  EXAMINATION: ONE XRAY VIEW OF THE CHEST 9/9/2022 4:57 pm COMPARISON: August 18, 2022 HISTORY: ORDERING SYSTEM PROVIDED HISTORY: Shortness of Breath TECHNOLOGIST PROVIDED HISTORY: Reason for exam:->Shortness of Breath Reason for Exam: sob, shaky FINDINGS: Whiteout redemonstrated of the right chest.  The left lung appears clear. Cardiomediastinal silhouette not appreciably changed. No pneumothorax or subdiaphragmatic free air. No acute osseous abnormality identified. Large right pleural effusion with complete whiteout of the right chest.        CBC:   Recent Labs     09/12/22 0317   WBC 8.8   HGB 12.8*        BMP:    Recent Labs     09/12/22  0317 09/13/22  1105 09/14/22 0912    140 136   K 4.7 4.3 4.3   CL 98* 100 95*   CO2 34* 30 35*   BUN 33* 29* 31*   CREATININE 1.6* 1.4* 1.3   GLUCOSE 124* 191* 124*     Hepatic:   Recent Labs     09/12/22 0317   AST 79*   ALT 60*   BILITOT 0.6   ALKPHOS 137*     Lipids:   Lab Results   Component Value Date/Time    CHOL 174 05/07/2022 03:25 AM    CHOL 230 10/14/2019 01:45 PM    HDL 28 05/07/2022 03:25 AM    TRIG 130 05/07/2022 03:25 AM     Hemoglobin A1C:   Lab Results   Component Value Date/Time    LABA1C 5.5 06/22/2022 10:23 AM     TSH:   Lab Results   Component Value Date/Time    TSH 1.39 10/14/2019 01:45 PM     Troponin:   Lab Results   Component Value Date/Time    TROPONINT 0.017 09/09/2022 04:25 PM    TROPONINT 0.027 06/21/2022 08:27 PM    TROPONINT 0.020 06/21/2022 02:42 PM     Lactic Acid: No results for input(s): LACTA in the last 72 hours. BNP: No results for input(s): PROBNP in the last 72 hours.   UA:  Lab Results   Component Value Date/Time    NITRU NEGATIVE 06/22/2022 12:10 PM    COLORU YELLOW 06/22/2022 12:10 PM [address] SEEN 06/22/2022 12:10 PM    RBCUA 1 06/22/2022 12:10 PM    MUCUS RARE 06/22/2022 12:10 PM    TRICHOMONAS NONE SEEN 06/22/2022 12:10 PM    BACTERIA NEGATIVE 06/22/2022 12:10 PM    CLARITYU CLEAR 06/22/2022 12:10 PM    SPECGRAV 1.020 06/22/2022 12:10 PM    LEUKOCYTESUR NEGATIVE 06/22/2022 12:10 PM    UROBILINOGEN 1.0 06/22/2022 12:10 PM    [address] NEGATIVE 06/22/2022 12:10 PM    BLOODU TRACE 06/22/2022 12:10 PM    Anette Fearing NEGATIVE 06/22/2022 12:10 PM     Urine Cultures: No results found for: LABURIN  Blood Cultures: No results found for: BC  No results found for: BLOODCULT2  Organism: No results found for: ORG    Time Spent Discharging patient 35 minutes    Electronically signed by Erik Hines MD on 9/14/2022 at 1:23 PM

## 2022-09-14 NOTE — PROGRESS NOTES
Nephrology Progress Note        [address] [place] [address]  Phone: [phone]  Office Hours: 8:30AM - 4:30PM  Monday - Friday 9/14/2022 7:08 AM  Subjective:   Admit Date: 9/9/2022  PCP: James Cade MD  Interval History:   Resting on NC    Diet: ADULT DIET; Regular; Low Sodium (2 gm); 2000 ml      Data:   Scheduled Meds:   furosemide  40 mg Oral BID    spironolactone  50 mg Oral Daily    albumin human  25 g IntraVENous Q8H    enoxaparin  30 mg SubCUTAneous BID    lactulose  30 g Oral 4 times per day    OLANZapine (ZyPREXA) in sterile water IntraMUSCular  2.5 mg IntraMUSCular Once    sodium chloride flush  5-40 mL IntraVENous 2 times per day    DULoxetine  60 mg Oral Daily    levothyroxine  50 mcg Oral Daily    nadolol  40 mg Oral Daily    rifAXIMin  550 mg Oral BID    pantoprazole  40 mg Oral QAM AC     Continuous Infusions:   sodium chloride       PRN Meds:sodium chloride flush, sodium chloride, ondansetron **OR** ondansetron, polyethylene glycol, acetaminophen **OR** acetaminophen, albuterol sulfate HFA  I/O last 3 completed shifts: In: 5 [P.O.:420]  Out: -   No intake/output data recorded.     Intake/Output Summary (Last 24 hours) at 9/14/2022 0708  Last data filed at 9/13/2022 1400  Gross per 24 hour   Intake 420 ml   Output --   Net 420 ml       CBC:   Recent Labs     09/11/22  1228 09/12/22 0317   WBC 8.1 8.8   HGB 13.1* 12.8*    173       BMP:    Recent Labs     09/11/22  1228 09/12/22 0317 09/13/22  1105    139 140   K 4.5 4.7 4.3   CL 99 98* 100   CO2 35* 34* 30   BUN 35* 33* 29*   CREATININE 1.5* 1.6* 1.4*   GLUCOSE 92 124* 191*     Hepatic:  ABGs:   Lab Results   Component Value Date/Time    PO2ART 73 06/23/2022 11:00 AM    HUT6IPA 63.0 06/23/2022 11:00 AM     INR:   Recent Labs     09/12/22  1626   INR 0.99       Objective:   Vitals: BP (!) 112/50   Pulse 75   Temp 98.6 °F (37 °C) (Oral)   Resp 17   Ht 5' 8\" (1.727 m)   Wt 237 lb 9.6 oz (107.8 kg)   SpO2 94%   PF (!) 1 L/min   BMI 36.13 kg/m²   General appearance: in no acute distress  HEENT: normocephalic, atraumatic,   Neck: supple, trachea midline  Lungs:  breathing comfortably on nc  Heart[de-identified] regular rate and rhythm,  Abdomen:  non distended,   Extremities: extremities atraumatic, no cyanosis or edema  Neurologic: drowsy    MEDICAL DECISION MAKING   -CANDIE; cr 1.8, it was 1 about a month ago  -Metabolic alkalosis  -Decompensated cirrhosis with ascites, hydrothorax  -Large right pleural effusion due to ascites/complete whiteout of the right chest/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

## 2022-09-14 NOTE — PLAN OF CARE
Problem: Discharge Planning  Goal: Discharge to home or other facility with appropriate resources  9/14/2022 1114 by Sudie Schilder, RN  Outcome: Completed  9/14/2022 0035 by Quan Gagnon RN  Outcome: Progressing     Problem: ABCDS Injury Assessment  Goal: Absence of physical injury  9/14/2022 1114 by Sudie Schilder, RN  Outcome: Completed  9/14/2022 0035 by Quan Gagnon RN  Outcome: Progressing     Problem: Safety - Adult  Goal: Free from fall injury  9/14/2022 1114 by Sudie Schilder, RN  Outcome: Completed  9/14/2022 0035 by Quan Gagnon RN  Outcome: Progressing  9/13/2022 2141 by Stephanie Pablo  Outcome: Progressing  Flowsheets (Taken 9/13/2022 1146 by Sudie Schilder, RN)  Free From Fall Injury: Instruct family/caregiver on patient safety     Problem: Neurosensory - Adult  Goal: Achieves stable or improved neurological status  9/14/2022 1114 by Sudie Schilder, RN  Outcome: Completed  9/14/2022 0035 by Quan Gagnon RN  Outcome: Progressing  9/13/2022 2141 by Stephanie Pablo  Outcome: Not Progressing     Problem: Respiratory - Adult  Goal: Achieves optimal ventilation and oxygenation  9/14/2022 1114 by Sudie Schilder, RN  Outcome: Completed  9/14/2022 0035 by Quan Gagnon RN  Outcome: Progressing     Problem: Metabolic/Fluid and Electrolytes - Adult  Goal: Electrolytes maintained within normal limits  9/14/2022 1114 by Sudie Schilder, RN  Outcome: Completed  9/14/2022 0035 by Quan Gagnon RN  Outcome: Progressing  Goal: Hemodynamic stability and optimal renal function maintained  9/14/2022 1114 by Sudie Schilder, RN  Outcome: Completed  9/14/2022 0035 by Quan Gagnon RN  Outcome: Progressing     Problem: Hematologic - Adult  Goal: Maintains hematologic stability  9/14/2022 1114 by Sudie Schilder, RN  Outcome: Completed  9/14/2022 0035 by Quan Gagnon RN  Outcome: Progressing     Problem: Pain  Goal: Verbalizes/displays adequate comfort level or baseline comfort level  9/14/2022 1114 by Sudie Schilder, RN  Outcome: Completed  9/14/2022 0035 by Lorenzo Hooper RN  Outcome: Progressing  9/13/2022 2141 by Osvaldo Guy  Outcome: Not Progressing     Problem: Neurosensory - Adult  Goal: Achieves stable or improved neurological status  9/14/2022 1114 by Corrine Valverde RN  Outcome: Completed  9/14/2022 0035 by Lorenzo Hooper RN  Outcome: Progressing  9/13/2022 2141 by Osvaldo Guy  Outcome: Not Progressing     Problem: Pain  Goal: Verbalizes/displays adequate comfort level or baseline comfort level  9/14/2022 1114 by Corrine Valverde RN  Outcome: Completed  9/14/2022 0035 by Lorenzo Hooper RN  Outcome: Progressing  9/13/2022 2141 by Osvaldo Guy  Outcome: Not Progressing

## 2022-09-14 NOTE — CARE COORDINATION
[facility] Liaison aware of discharge & will initiate Perez Burns.

## 2022-09-15 ENCOUNTER — TELEPHONE (OUTPATIENT)
Dept: FAMILY MEDICINE CLINIC | Age: 77
End: 2022-09-15

## 2022-09-15 LAB
CULTURE: NORMAL
Lab: NORMAL
SPECIMEN: NORMAL

## 2022-09-15 NOTE — TELEPHONE ENCOUNTER
[facility] Transitions Initial Follow Up Call    Outreach made within 2 business days of discharge: Yes    Patient: Gibson Seo Patient : 1945   MRN: [id number]  Reason for Admission: There are no discharge diagnoses documented for the most recent discharge. Discharge Date: 22       Spoke with: No answer. Cell number not taking calls. Left voice mail on home phone.        Katelynn Farley MA

## 2022-09-15 NOTE — PROCEDURES
Azam Pinzon is a 78 y.o. male patient. 1. Recurrent pleural effusion on right    2. Ascites due to alcoholic cirrhosis ([facility]    3. Dyspnea and respiratory abnormalities    4. CANDIE (acute kidney injury) ([facility]      Past Medical History:   Diagnosis Date    Anxiety     Cirrhosis, alcoholic ([facility]     Diabetes mellitus ([facility]     GERD (gastroesophageal reflux disease) 2000    GERD (gastroesophageal reflux disease)     Hyperlipidemia     Hypertension     Portal hypertension (HCC)     Pulmonary nodules     Sleep apnea     SOB (shortness of breath)     Thyroid disease      Blood pressure (!) 112/50, pulse 74, temperature 98.6 °F (37 °C), temperature source Oral, resp. rate 17, height 5' 8\" (1.727 m), weight 237 lb 9.6 oz (107.8 kg), SpO2 92 %, peak flow (!) 1 L/min. Thoracentesis    Date/Time: 9/14/2022 8:35 PM  Performed by: Thanh Colunga MD  Authorized by: Thanh Colunga MD   Consent: Verbal consent obtained. Written consent obtained. Risks and benefits: risks, benefits and alternatives were discussed  Consent given by: patient  Patient understanding: patient states understanding of the procedure being performed  Patient consent: the patient's understanding of the procedure matches consent given  Procedure consent: procedure consent matches procedure scheduled  Relevant documents: relevant documents present and verified  Test results: test results available and properly labeled  Site marked: the operative site was marked  Imaging studies: imaging studies available  Patient identity confirmed: verbally with patient and arm band  Time out: Immediately prior to procedure a \"time out\" was called to verify the correct patient, procedure, equipment, support staff and site/side marked as required.   Patient tolerance: patient tolerated the procedure well with no immediate complications    ESTIMATED BLOOD LOSS: NONE  2050 cc milky fluid was drained    14 G step cath over needle 16 G used  1% Lidocaine 10 ml used  CXR STAT  Magdiel Whitfield MD  9/14/2022

## 2022-09-16 LAB
CULTURE: NORMAL
Lab: NORMAL
SPECIMEN: NORMAL

## 2022-09-27 NOTE — PROGRESS NOTES
Physician Progress Note      PATIENT:               Gary Fabry  CSN #:                  [id number]  :                       1945  ADMIT DATE:       2022 5:37 PM  [address] [place] DATE:        2022 4:38 PM  RESPONDING  PROVIDER #:        Shayla Segundo MD          QUERY TEXT:    Patient admitted with cirrhosis.  attending progress note states, \"CANDIE on   CKD. Cr down trending, could be 2/2 hepatorenal syndrome. \"  If possible,   please document in the progress notes and discharge summary if hepatorenal   syndrome was: The medical record reflects the following:  Risk Factors: CKD  Clinical Indicators: Creat 1.4 on , 1.3 on . Per nephrology, \" cr 1.8,   it was 1 about a month ago. \"  Treatment: IV Lasix, po Lasix, nephrology consult, Aldactone    JOSAFAT ForteN, RN  Clinical   [phone]  Options provided:  -- Hepatorenal syndrome confirmed after study  -- Hepatorenal syndrome ruled out after study  -- Other - I will add my own diagnosis  -- Disagree - Not applicable / Not valid  -- Disagree - Clinically unable to determine / Unknown  -- Refer to Clinical Documentation Reviewer    PROVIDER RESPONSE TEXT:    Hepatorenal syndrome ruled out after study.     Query created by: Chintan Mcleod on 2022 2:51 PM      Electronically signed by:  Shayla Segundo MD 2022 9:00 AM

## 2022-10-03 ENCOUNTER — TELEPHONE (OUTPATIENT)
Dept: GASTROENTEROLOGY | Age: 77
End: 2022-10-03

## 2022-10-03 DIAGNOSIS — R18.8 OTHER ASCITES: Primary | ICD-10-CM

## 2022-10-03 NOTE — TELEPHONE ENCOUNTER
Called patient and could not leave voice mail.   His paracentesis is scheduled for Oct 10, 2022 at 8:30 with an arrival time of 6:30 at the hospital.

## 2022-10-04 ENCOUNTER — APPOINTMENT (OUTPATIENT)
Dept: GENERAL RADIOLOGY | Age: 77
DRG: 432 | End: 2022-10-04
Payer: MEDICARE

## 2022-10-04 ENCOUNTER — APPOINTMENT (OUTPATIENT)
Dept: INTERVENTIONAL RADIOLOGY/VASCULAR | Age: 77
DRG: 432 | End: 2022-10-04
Payer: MEDICARE

## 2022-10-04 ENCOUNTER — HOSPITAL ENCOUNTER (INPATIENT)
Age: 77
LOS: 2 days | Discharge: HOME | DRG: 432 | End: 2022-10-06
Attending: EMERGENCY MEDICINE | Admitting: FAMILY MEDICINE
Payer: MEDICARE

## 2022-10-04 DIAGNOSIS — K70.31 ASCITES DUE TO ALCOHOLIC CIRRHOSIS (HCC): ICD-10-CM

## 2022-10-04 DIAGNOSIS — J90 RECURRENT RIGHT PLEURAL EFFUSION: Primary | ICD-10-CM

## 2022-10-04 DIAGNOSIS — R09.02 HYPOXIA: ICD-10-CM

## 2022-10-04 LAB
ALBUMIN SERPL-MCNC: 3.2 GM/DL (ref 3.4–5)
ALP BLD-CCNC: 111 IU/L (ref 40–129)
ALT SERPL-CCNC: 31 U/L (ref 10–40)
AMMONIA: 56 UMOL/L (ref 16–60)
ANION GAP SERPL CALCULATED.3IONS-SCNC: 7 MMOL/L (ref 4–16)
APTT: 32.7 SECONDS (ref 25.1–37.1)
AST SERPL-CCNC: 42 IU/L (ref 15–37)
BILIRUB SERPL-MCNC: 1.1 MG/DL (ref 0–1)
BUN BLDV-MCNC: 27 MG/DL (ref 6–23)
CALCIUM SERPL-MCNC: 10.3 MG/DL (ref 8.3–10.6)
CHLORIDE BLD-SCNC: 94 MMOL/L (ref 99–110)
CO2: 32 MMOL/L (ref 21–32)
CREAT SERPL-MCNC: 1.4 MG/DL (ref 0.9–1.3)
GFR AFRICAN AMERICAN: 60 ML/MIN/1.73M2
GFR NON-AFRICAN AMERICAN: 50 ML/MIN/1.73M2
GLUCOSE BLD-MCNC: 157 MG/DL (ref 70–99)
HCT VFR BLD CALC: 40.8 % (ref 42–52)
HEMOGLOBIN: 13.4 GM/DL (ref 13.5–18)
INR BLD: 0.99 INDEX
LIPASE: 42 IU/L (ref 13–60)
MAGNESIUM: 1.9 MG/DL (ref 1.8–2.4)
MCH RBC QN AUTO: 32.6 PG (ref 27–31)
MCHC RBC AUTO-ENTMCNC: 32.8 % (ref 32–36)
MCV RBC AUTO: 99.3 FL (ref 78–100)
PDW BLD-RTO: 15.3 % (ref 11.7–14.9)
PLATELET # BLD: 184 K/CU MM (ref 140–440)
PMV BLD AUTO: 10.7 FL (ref 7.5–11.1)
POTASSIUM SERPL-SCNC: 4.5 MMOL/L (ref 3.5–5.1)
PRO-BNP: 288.3 PG/ML
PROTHROMBIN TIME: 12.8 SECONDS (ref 11.7–14.5)
RBC # BLD: 4.11 M/CU MM (ref 4.6–6.2)
SODIUM BLD-SCNC: 133 MMOL/L (ref 135–145)
TOTAL PROTEIN: 7.7 GM/DL (ref 6.4–8.2)
TROPONIN T: <0.01 NG/ML
WBC # BLD: 7.6 K/CU MM (ref 4–10.5)

## 2022-10-04 PROCEDURE — 6370000000 HC RX 637 (ALT 250 FOR IP): Performed by: PHYSICIAN ASSISTANT

## 2022-10-04 PROCEDURE — 83690 ASSAY OF LIPASE: CPT

## 2022-10-04 PROCEDURE — 36415 COLL VENOUS BLD VENIPUNCTURE: CPT

## 2022-10-04 PROCEDURE — P9047 ALBUMIN (HUMAN), 25%, 50ML: HCPCS | Performed by: PHYSICIAN ASSISTANT

## 2022-10-04 PROCEDURE — 82140 ASSAY OF AMMONIA: CPT

## 2022-10-04 PROCEDURE — 83880 ASSAY OF NATRIURETIC PEPTIDE: CPT

## 2022-10-04 PROCEDURE — 85730 THROMBOPLASTIN TIME PARTIAL: CPT

## 2022-10-04 PROCEDURE — 85610 PROTHROMBIN TIME: CPT

## 2022-10-04 PROCEDURE — 1200000000 HC SEMI PRIVATE

## 2022-10-04 PROCEDURE — 2580000003 HC RX 258: Performed by: PHYSICIAN ASSISTANT

## 2022-10-04 PROCEDURE — 99285 EMERGENCY DEPT VISIT HI MDM: CPT

## 2022-10-04 PROCEDURE — 84484 ASSAY OF TROPONIN QUANT: CPT

## 2022-10-04 PROCEDURE — 71045 X-RAY EXAM CHEST 1 VIEW: CPT

## 2022-10-04 PROCEDURE — 83735 ASSAY OF MAGNESIUM: CPT

## 2022-10-04 PROCEDURE — 0W9G3ZZ DRAINAGE OF PERITONEAL CAVITY, PERCUTANEOUS APPROACH: ICD-10-PCS | Performed by: RADIOLOGY

## 2022-10-04 PROCEDURE — 85027 COMPLETE CBC AUTOMATED: CPT

## 2022-10-04 PROCEDURE — 80053 COMPREHEN METABOLIC PANEL: CPT

## 2022-10-04 PROCEDURE — 94761 N-INVAS EAR/PLS OXIMETRY MLT: CPT

## 2022-10-04 PROCEDURE — 49083 ABD PARACENTESIS W/IMAGING: CPT

## 2022-10-04 PROCEDURE — 93005 ELECTROCARDIOGRAM TRACING: CPT | Performed by: EMERGENCY MEDICINE

## 2022-10-04 PROCEDURE — 6360000002 HC RX W HCPCS: Performed by: PHYSICIAN ASSISTANT

## 2022-10-04 RX ORDER — NADOLOL 20 MG/1
40 TABLET ORAL NIGHTLY
Status: DISCONTINUED | OUTPATIENT
Start: 2022-10-04 | End: 2022-10-06 | Stop reason: HOSPADM

## 2022-10-04 RX ORDER — DULOXETIN HYDROCHLORIDE 30 MG/1
60 CAPSULE, DELAYED RELEASE ORAL DAILY
Status: DISCONTINUED | OUTPATIENT
Start: 2022-10-05 | End: 2022-10-06 | Stop reason: HOSPADM

## 2022-10-04 RX ORDER — ACETAMINOPHEN 650 MG/1
650 SUPPOSITORY RECTAL EVERY 6 HOURS PRN
Status: DISCONTINUED | OUTPATIENT
Start: 2022-10-04 | End: 2022-10-06 | Stop reason: HOSPADM

## 2022-10-04 RX ORDER — ALBUMIN (HUMAN) 12.5 G/50ML
30 SOLUTION INTRAVENOUS ONCE
Status: COMPLETED | OUTPATIENT
Start: 2022-10-04 | End: 2022-10-04

## 2022-10-04 RX ORDER — ACETAMINOPHEN 325 MG/1
650 TABLET ORAL EVERY 6 HOURS PRN
Status: DISCONTINUED | OUTPATIENT
Start: 2022-10-04 | End: 2022-10-06 | Stop reason: HOSPADM

## 2022-10-04 RX ORDER — PANTOPRAZOLE SODIUM 40 MG/1
40 TABLET, DELAYED RELEASE ORAL
Status: DISCONTINUED | OUTPATIENT
Start: 2022-10-05 | End: 2022-10-06 | Stop reason: HOSPADM

## 2022-10-04 RX ORDER — SODIUM CHLORIDE 0.9 % (FLUSH) 0.9 %
5-40 SYRINGE (ML) INJECTION EVERY 12 HOURS SCHEDULED
Status: DISCONTINUED | OUTPATIENT
Start: 2022-10-04 | End: 2022-10-06 | Stop reason: HOSPADM

## 2022-10-04 RX ORDER — FUROSEMIDE 40 MG/1
40 TABLET ORAL 2 TIMES DAILY
Status: DISCONTINUED | OUTPATIENT
Start: 2022-10-04 | End: 2022-10-06 | Stop reason: HOSPADM

## 2022-10-04 RX ORDER — ONDANSETRON 2 MG/ML
4 INJECTION INTRAMUSCULAR; INTRAVENOUS EVERY 6 HOURS PRN
Status: DISCONTINUED | OUTPATIENT
Start: 2022-10-04 | End: 2022-10-06 | Stop reason: HOSPADM

## 2022-10-04 RX ORDER — LEVOTHYROXINE SODIUM 0.05 MG/1
50 TABLET ORAL DAILY
Status: DISCONTINUED | OUTPATIENT
Start: 2022-10-05 | End: 2022-10-06 | Stop reason: HOSPADM

## 2022-10-04 RX ORDER — POLYETHYLENE GLYCOL 3350 17 G/17G
17 POWDER, FOR SOLUTION ORAL DAILY PRN
Status: DISCONTINUED | OUTPATIENT
Start: 2022-10-04 | End: 2022-10-06 | Stop reason: HOSPADM

## 2022-10-04 RX ORDER — ONDANSETRON 4 MG/1
4 TABLET, ORALLY DISINTEGRATING ORAL EVERY 8 HOURS PRN
Status: DISCONTINUED | OUTPATIENT
Start: 2022-10-04 | End: 2022-10-06 | Stop reason: HOSPADM

## 2022-10-04 RX ORDER — SPIRONOLACTONE 50 MG/1
100 TABLET, FILM COATED ORAL 2 TIMES DAILY
Status: DISCONTINUED | OUTPATIENT
Start: 2022-10-04 | End: 2022-10-06 | Stop reason: HOSPADM

## 2022-10-04 RX ORDER — LACTULOSE 10 G/15ML
20 SOLUTION ORAL 3 TIMES DAILY
Status: DISCONTINUED | OUTPATIENT
Start: 2022-10-04 | End: 2022-10-06 | Stop reason: HOSPADM

## 2022-10-04 RX ORDER — SODIUM CHLORIDE 9 MG/ML
INJECTION, SOLUTION INTRAVENOUS PRN
Status: DISCONTINUED | OUTPATIENT
Start: 2022-10-04 | End: 2022-10-06 | Stop reason: HOSPADM

## 2022-10-04 RX ORDER — SPIRONOLACTONE 100 MG/1
100 TABLET, FILM COATED ORAL 2 TIMES DAILY
COMMUNITY

## 2022-10-04 RX ORDER — ESOMEPRAZOLE MAGNESIUM 20 MG/1
20 FOR SUSPENSION ORAL DAILY
Status: DISCONTINUED | OUTPATIENT
Start: 2022-10-05 | End: 2022-10-04 | Stop reason: CLARIF

## 2022-10-04 RX ORDER — SODIUM CHLORIDE 0.9 % (FLUSH) 0.9 %
5-40 SYRINGE (ML) INJECTION PRN
Status: DISCONTINUED | OUTPATIENT
Start: 2022-10-04 | End: 2022-10-06 | Stop reason: HOSPADM

## 2022-10-04 RX ORDER — ALBUTEROL SULFATE 90 UG/1
2 AEROSOL, METERED RESPIRATORY (INHALATION) EVERY 6 HOURS PRN
Status: DISCONTINUED | OUTPATIENT
Start: 2022-10-04 | End: 2022-10-06 | Stop reason: HOSPADM

## 2022-10-04 RX ADMIN — SPIRONOLACTONE 100 MG: 50 TABLET ORAL at 22:59

## 2022-10-04 RX ADMIN — ALBUMIN (HUMAN) 30 G: 0.25 INJECTION, SOLUTION INTRAVENOUS at 18:15

## 2022-10-04 RX ADMIN — FUROSEMIDE 40 MG: 40 TABLET ORAL at 17:19

## 2022-10-04 RX ADMIN — SODIUM CHLORIDE, PRESERVATIVE FREE 10 ML: 5 INJECTION INTRAVENOUS at 23:01

## 2022-10-04 RX ADMIN — LACTULOSE 20 G: 10 SOLUTION ORAL at 17:30

## 2022-10-04 RX ADMIN — NADOLOL 40 MG: 20 TABLET ORAL at 23:00

## 2022-10-04 RX ADMIN — RIFAXIMIN 550 MG: 550 TABLET ORAL at 22:58

## 2022-10-04 ASSESSMENT — PAIN SCALES - GENERAL: PAINLEVEL_OUTOF10: 0

## 2022-10-04 NOTE — ED PROVIDER NOTES
Emergency Department Encounter    Patient: Alana Toribio  MRN: [id number]  : 1945  Date of Evaluation: 10/4/2022  ED Provider:  Rebeca Peabody, DO    Triage Chief Complaint:   Shortness of Breath    [place]:  Alana Toribio is a 78 y.o. male that presents to the emergency department complaining of shortness of breath and abdominal swelling. Wife gives most of the history of present illness. Patient with a history of CVA and some verbal difficulties. He is able to answer some questions with yes and no answers. Wife states he has liver cirrhosis and pleural effusions. She states he has been calm in the last couple weeks to the emergency department to have been drained by interventional radiologist.  She states her gastroenterologist Dr. Ludwig Tinajero will not schedule outpatient paracenteses in the acute had to come back to the emergency department and get admitted to the hospital.  She states he was diagnosed with liver cirrhosis about a year ago. He states he is on Lasix and spironolactone diuretics. She states he has had some shortness of breath abdominal swelling coughing up some white phlegm. He denies any chest pain nausea vomiting diarrhea abdominal pain. Patient is admitting to dizziness and lightheadedness generalized weakness. Weakness in the legs. Wife states he is taking all his medicine. Patient denies any headache falls injuries. Patient here for evaluation.     ROS - see HPI, below listed is current ROS at time of my eval:  General:  No fevers, no chills, generalized weakness  Eyes:  No recent vison changes, no discharge  ENT:  No sore throat, no nasal congestion, no hearing changes  Cardiovascular:  No chest pain, no palpitations  Respiratory: Positive for shortness of breath, no cough, no wheezing  Gastrointestinal:  No pain, no nausea, no vomiting, no diarrhea  Musculoskeletal:  No muscle pain, no joint pain  Skin:  No rash, no pruritis, no easy bruising  Neurologic:  No speech Worried About Running Out of Food in the Last Year: Never true    Ran Out of Food in the Last Year: Never true   Transportation Needs: Not on file   Physical Activity: Not on file   Stress: Not on file   Social Connections: Not on file   Intimate Partner Violence: Not on file   Housing Stability: Not on file     No current facility-administered medications for this encounter. Current Outpatient Medications   Medication Sig Dispense Refill    furosemide (LASIX) 40 MG tablet Take 1 tablet by mouth in the morning and 1 tablet in the evening. 60 tablet 3    albuterol sulfate HFA (VENTOLIN HFA) 108 (90 Base) MCG/ACT inhaler Inhale 2 puffs into the lungs 4 times daily as needed for Wheezing 1 each 0    nadolol (CORGARD) 40 MG tablet Take 1 tablet by mouth in the morning.  90 tablet 3    spironolactone (ALDACTONE) 50 MG tablet Take 1 tablet by mouth daily 30 tablet 5    rifAXIMin (XIFAXAN) 550 MG tablet Take 1 tablet by mouth 2 times daily 60 tablet 5    levothyroxine (SYNTHROID) 50 MCG tablet Take 1 tablet by mouth Daily 90 tablet 1    lactulose (CHRONULAC) 10 GM/15ML solution Take 30 mLs by mouth 3 times daily Hold if having diarrhea 2700 mL 5    DULoxetine (CYMBALTA) 60 MG extended release capsule TAKE 1 CAPSULE BY MOUTH ONCE DAILY 90 capsule 1    Multiple Vitamin (MULTI VITAMIN MENS PO) Take 1 tablet by mouth daily      ESOMEPRAZOLE MAGNESIUM PO Take 20 mg by mouth daily OTC      Omega-3 1000 MG CAPS Take 1 capsule by mouth daily       Allergies   Allergen Reactions    Lisinopril Swelling     Tongue swelling      Zetia [Ezetimibe] Swelling     Facial swelling    Atorvastatin     Codeine Other (See Comments)     Blood pressure drops    Hydrochlorothiazide     Hydroxyzine      Fatigue and depressed    Lexapro [Escitalopram Oxalate]      Increased depression    Pravastatin        Nursing Notes Reviewed    Physical Exam:  Triage VS:    ED Triage Vitals   Enc Vitals Group      BP 10/04/22 1103 (!) 147/96      Heart Rate 10/04/22 1103 70      Resp 10/04/22 1103 24      Temp 10/04/22 1103 98.5 °F (36.9 °C)      Temp Source 10/04/22 1103 Oral      SpO2 10/04/22 1103 (!) 88 %      Weight 10/04/22 1028 234 lb (106.1 kg)      Height 10/04/22 1028 5' 8\" (1.727 m)      Head Circumference --       Peak Flow --       Pain Score --       Pain Loc --       Pain Edu? --       Excl. in [address]? --    BP (!) 152/81   Pulse 73   Temp 98.5 °F (36.9 °C) (Oral)   Resp 20   Ht 5' 8\" (1.727 m)   Wt 234 lb (106.1 kg)   SpO2 100%   BMI 35.58 kg/m²       My pulse ox interpretation is - normal    General appearance:  No acute distress. Skin:  Warm. Dry. Eye:  Extraocular movements intact. Ears, nose, mouth and throat:  Oral mucosa dry  Neck:  Trachea midline. Extremity:  No swelling. Normal ROM     Heart:  Regular rate and rhythm, normal S1 & S2, no extra heart sounds. Perfusion:  intact  Respiratory: Diminished breath sounds in the left lung, left lung clear, respirations increased work of breathing, no respiratory failure  Abdominal:  Normal bowel sounds. Soft. Nontender. Positive abdominal distended. Back:  No CVA tenderness to palpation     Neurological: Nonverbal, alert and oriented times 1.   History of CVA with deficits,                I have reviewed and interpreted all of the currently available lab results from this visit (if applicable):  Results for orders placed or performed during the hospital encounter of 10/04/22   CBC   Result Value Ref Range    WBC 7.6 4.0 - 10.5 K/CU MM    RBC 4.11 (L) 4.6 - 6.2 M/CU MM    Hemoglobin 13.4 (L) 13.5 - 18.0 GM/DL    Hematocrit 40.8 (L) 42 - 52 %    MCV 99.3 78 - 100 FL    MCH 32.6 (H) 27 - 31 PG    MCHC 32.8 32.0 - 36.0 %    RDW 15.3 (H) 11.7 - 14.9 %    Platelets 404 223 - 699 K/CU MM    MPV 10.7 7.5 - 11.1 FL   Comprehensive Metabolic Panel   Result Value Ref Range    Sodium 133 (L) 135 - 145 MMOL/L    Potassium 4.5 3.5 - 5.1 MMOL/L    Chloride 94 (L) 99 - 110 mMol/L    CO2 32 21 - 32 MMOL/L    BUN 27 (H) 6 - 23 MG/DL    Creatinine 1.4 (H) 0.9 - 1.3 MG/DL    Glucose 157 (H) 70 - 99 MG/DL    Calcium 10.3 8.3 - 10.6 MG/DL    Albumin 3.2 (L) 3.4 - 5.0 GM/DL    Total Protein 7.7 6.4 - 8.2 GM/DL    Total Bilirubin 1.1 (H) 0.0 - 1.0 MG/DL    ALT 31 10 - 40 U/L    AST 42 (H) 15 - 37 IU/L    Alkaline Phosphatase 111 40 - 129 IU/L    GFR Non- 50 (L) >60 mL/min/1.73m2    GFR  60 (L) >60 mL/min/1.73m2    Anion Gap 7 4 - 16   Troponin   Result Value Ref Range    Troponin T <0.010 <0.01 NG/ML   Magnesium   Result Value Ref Range    Magnesium 1.9 1.8 - 2.4 mg/dl   Lipase   Result Value Ref Range    Lipase 42 13 - 60 IU/L   Brain Natriuretic Peptide   Result Value Ref Range    Pro-.3 <300 PG/ML   EKG 12 Lead   Result Value Ref Range    Ventricular Rate 70 BPM    Atrial Rate 70 BPM    P-R Interval 154 ms    QRS Duration 80 ms    Q-T Interval 416 ms    QTc Calculation (Bazett) 449 ms    P Axis 15 degrees    R Axis 62 degrees    T Axis 34 degrees    Diagnosis       Normal sinus rhythm  Low voltage QRS  Borderline ECG  When compared with ECG of 09-SEP-2022 16:23,  No significant change was found        Radiographs (if obtained):  Radiologist's Report Reviewed:  XR CHEST PORTABLE    Result Date: 10/4/2022  EXAMINATION: ONE XRAY VIEW OF THE CHEST 10/4/2022 10:50 am COMPARISON: 09/14/2022 HISTORY: ORDERING SYSTEM PROVIDED HISTORY: Chest Pain TECHNOLOGIST PROVIDED HISTORY: Reason for exam:->Chest Pain Reason for Exam: Chest Pain FINDINGS: There is complete opacification of the right hemithorax consistent with a large right pleural effusion. The left lung field is clear.      Near complete opacification the right hemithorax consistent with large pleural effusion       EKG (if obtained): (All EKG's are interpreted by myself in the absence of a cardiologist)      MDM:  Patient presents to the emergency department for abdominal swelling distention history of ascites due to liver cirrhosis and shortness of breath with history of pleural effusions. Laboratory studies ordered and shows elevated wbc count. Hemoglobin 13.4 and normal platelets. Sodium 133 normal potasium. Ckd creatinine 1.4. normal calcium. Glucose 157. Negative troponin, normal magnesium. Normal lipase and BNP. Chest x-ray showing opacification with large pleural effusion of the right lung. He does have diminished breath sounds on auscultation. Patient abdomen had distended and tight history of alcohol cirrhosis. Did consult interventional radiology they state they can do paracentesis today. Patient hypoxic oxygen saturation 89 to 91% on room air. He ordered 2 L oxygen nasal cannula oxygen saturation up to 98%. Hospitalist consulted for admission and they will be admitting patient for further evaluation treatment and management. Clinical Impression:  1. Recurrent right pleural effusion    2. Ascites due to alcoholic cirrhosis ([address]    3. Hypoxia          ED Provider Disposition Time  DISPOSITION Decision To Admit 10/04/2022 12:16:44 PM      Comment: Please note this report has been produced using speech recognition software and may contain errors related to that system including errors in grammar, punctuation, and spelling, as well as words and phrases that may be inappropriate. Efforts were made to edit the dictations.        Rebeca Peabody, DO  10/04/22 [id number]

## 2022-10-04 NOTE — PROGRESS NOTES
4 Eyes Skin Assessment     NAME:  Buddy Zuniga OF BIRTH:  1945  MEDICAL RECORD NUMBER:  [id number]    The patient is being assess for  Admission    I agree that 2 RN's have performed a thorough Head to Toe Skin Assessment on the patient. ALL assessment sites listed below have been assessed. Areas assessed by both nurses:    {Pressure Areas Assessed:85061}        Does the Patient have a Wound?  {Action Wound:50567}       Andre Prevention initiated:  {YES/NO:19732}   Wound Care Orders initiated:  {YES/NO:19732}    Pressure Injury (Stage 3,4, Unstageable, DTI, NWPT, and Complex wounds) if present place referral/consult order under [de-identified] {YES/NO:19732}    New and Established Ostomies if present place consult order under : {YES/NO:19732}      Nurse 1 eSignature: {Esignature:[id number]}    **SHARE this note so that the co-signing nurse is able to place an eSignature**    Nurse 2 eSignature: {Esignature:[id number]}

## 2022-10-04 NOTE — H&P
V2.0  History and Physical      Name:  Saran Madrid /Age/Sex: 1945  (78 y.o. male)   MRN & CSN:  [id number] & [id number] Encounter Date/Time: 10/4/2022 1:01 PM EDT   Location:  ED30/ED-30 PCP: Kamilla Castellano [address] M[address] Infante Day: 1    Assessment and Plan:   Saran Madrid is a 78 y.o. male with a past medical history of alcoholic cirrhosis, hyperlipidemia, hypertension who presents with Pleural effusion    Recurrent right-sided pleural effusion  Acute hypoxic respiratory failure   -Multiple prior paracenteses most recent thoracentesis 22  -SpO2 88% on room air in ED, currently requiring 2-3L nasal cannula and does wear O2 intermittently at home  -consult pulmonology, Dr. Keily Paniagua     Recurrent ascites  -Multiple prior paracenteses, most recent 22  -IR consulted from ED, status post paracentesis 10/3/22 with 6L off  - albumin ordered   - continue home diuretics    Alcoholic liver cirrhosis  Portal hypertension  History of esophageal varices  History of hepatic encephalopathy  -Continue nadolol, spironolactone and Lasix  -Continue rifaximin, lactulose  -follows with Dr. Renetta Kendall, followed on recent admission and deemed not a candidate for TIPS or transplant    CKDIII  -baseline Cr ~1.2-1.3  - Cr 1.4 on admission  - monitor creatinine     Hypertension   -BP stable  - continue home nadalol, Aldactone, Lasix    Hypothyroidism   - continue home synthroid     History of alcohol abuse   - no longer drinking, patient reports 202 days since last drink    This patient was seen and examined in conjunction with Dr. Sadi Salas. She was agreeable with the plan and management as dictated above.      Hospital Problems             Last Modified POA    * (Principal) Pleural effusion 10/4/2022 Yes       Disposition:   Current Living situation: home   Expected Disposition: same  Estimated D/C: 1-2 days    Diet No diet orders on file   DVT Prophylaxis [] Lovenox, []  Heparin, [x] SCDs, [x] Ambulation,  [] Eliquis, [] Xarelto (hold lovenox until thoracentesis)   Code Status Prior   Surrogate Decision Maker/ POA Fay Castorena, spouse     History from:     patient      History of Present Illness:     Chief Complaint: Pleural effusion  Hoang Barry is a 78 y.o. male who presents with shortness of breath. History is somewhat difficult to obtain as patient is very hard of hearing. Patient presented to the emergency department with complaints of progressively worsening shortness of breath. He has a prior history of recurrent ascites and recurrent pleural effusion. He states he is intermittently on oxygen at home and has been requiring about 2 L over the last 5 to 6 days. He noted increasing shortness of breath on exertion and abdominal distention. He reports compliance with his home medications. He denies chest pain, abdominal pain. He has chronic lower extremity edema, left worse than right. He was taken for paracentesis from the emergency departments with 6 L removed and states great relief of symptoms. We discussed plan of care including admission for thoracentesis, patient is agreeable. Review of Systems:   Ten point ROS reviewed negative, unless as noted above    Objective:   No intake or output data in the 24 hours ending 10/04/22 1301   Vitals:   Vitals:    10/04/22 1103 10/04/22 1132 10/04/22 1201 10/04/22 1232   BP: (!) 147/96 124/72 132/71 133/73   Pulse: 70 69 69 70   Resp: 24 22 19    Temp: 98.5 °F (36.9 °C)      TempSrc: Oral      SpO2: (!) 88% (!) 89% 100% 100%   Weight:       Height:           Medications Prior to Admission     Prior to Admission medications    Medication Sig Start Date End Date Taking? Authorizing Provider   spironolactone (ALDACTONE) 100 MG tablet Take 100 mg by mouth 2 times daily   Yes Historical Provider, MD   furosemide (LASIX) 40 MG tablet Take 1 tablet by mouth in the morning and 1 tablet in the evening.  9/15/22   Deshawn Caraballo MD   albuterol sulfate HFA (VENTOLIN HFA) 108 (90 Base) MCG/ACT inhaler Inhale 2 puffs into the lungs 4 times daily as needed for Wheezing 7/26/22   Joe Pack MD   nadolol (CORGARD) 40 MG tablet Take 1 tablet by mouth in the morning. Patient taking differently: Take 40 mg by mouth nightly 7/20/22 10/4/22  MOHINDER Pinto - CNP   rifAXIMin [place] [facility]als) 550 MG tablet Take 1 tablet by mouth 2 times daily 7/12/22   MOHINDER Sebastian - CNP   levothyroxine (SYNTHROID) 50 MCG tablet Take 1 tablet by mouth Daily 3/30/22 10/4/22  Joe Pack MD   lactulose [facility]) 10 GM/15ML solution Take 30 mLs by mouth 3 times daily Hold if having diarrhea 3/21/22   Fallon Ferguson MD   DULoxetine (CYMBALTA) 60 MG extended release capsule TAKE 1 CAPSULE BY MOUTH ONCE DAILY  Patient taking differently: Take 60 mg by mouth daily TAKE 1 CAPSULE BY MOUTH ONCE DAILY 3/11/22 1/13/23  Joe Pack MD   Multiple Vitamin (MULTI VITAMIN MENS PO) Take 1 tablet by mouth daily    Historical Provider, MD   ESOMEPRAZOLE MAGNESIUM PO Take 20 mg by mouth daily OTC    Historical Provider, MD   [place]-3 1000 MG CAPS Take 2,000 mg by mouth daily    Historical Provider, MD       Physical Exam:   GEN Awake male, sitting upright in bed in no apparent distress. Appears given age. EYES Pupils are equally round. No scleral erythema, discharge, or conjunctivitis. HENT Mucous membranes are moist.  Hard of hearing  NECK Supple  RESP decreased lung sounds throughout right lung, otherwise clear to auscultation, respirations even and unlabored on 3L NC   CARDIO/VASC   S1/S2 auscultated. Regular rate without appreciable murmurs, rubs, or gallops. Peripheral pulses equal bilaterally and palpable. 1-2+ bilateral lower extremity pitting edema, left greater than right  GI Abdomen is soft with mild distention, no significant tenderness    Schilling catheter is not present. MSK No gross joint deformities. SKIN Normal coloration, warm, dry.   NEURO Cranial nerves appear grossly intact, normal speech, no lateralizing weakness. PSYCH Awake, alert, oriented x 4. Affect appropriate. Past Medical History:   PMHx   Past Medical History:   Diagnosis Date    Anxiety     Cirrhosis, alcoholic ([facility] [address]     Diabetes mellitus ([facility] [address]     GERD (gastroesophageal reflux disease)     GERD (gastroesophageal reflux disease)     Hyperlipidemia     Hypertension     Portal hypertension (HCC)     Pulmonary nodules     Sleep apnea     SOB (shortness of breath)     Thyroid disease      PSHX:  has a past surgical history that includes Cholecystectomy; Vasectomy; Colonoscopy; Endoscopy, colon, diagnostic; Foot surgery; Upper gastrointestinal endoscopy (N/A, 2022); Tonsillectomy; and Appendectomy. Allergies: Allergies   Allergen Reactions    Lisinopril Swelling     Tongue swelling      Zetia [Ezetimibe] Swelling     Facial swelling    Atorvastatin     Codeine Other (See Comments)     Blood pressure drops    Hydrochlorothiazide     Hydroxyzine      Fatigue and depressed    Lexapro [Escitalopram Oxalate]      Increased depression    Pravastatin      Fam HX:  family history includes Diabetes in an other family member; Hypertension in his brother.   Soc HX:   Social History     Socioeconomic History    Marital status:    Occupational History     Comment: Retired    Tobacco Use    Smoking status: Former     Packs/day: 1.00     Years: 14.00     Pack years: 14.00     Types: Cigarettes     Quit date:      Years since quittin.7    Smokeless tobacco: Never   Vaping Use    Vaping Use: Never used   Substance and Sexual Activity    Alcohol use: Not Currently    Drug use: Never    Sexual activity: Yes     Partners: Female     Social Determinants of Health     Financial Resource Strain: Low Risk     Difficulty of Paying Living Expenses: Not hard at all   Food Insecurity: No Food Insecurity    Worried About [address] in the Last Year: Never true    [facility] in the Last Year: Never true       Medications:   Medications:    Infusions:   PRN Meds:     Labs    CBC:   Recent Labs     10/04/22  1111   WBC 7.6   HGB 13.4*        BMP:    Recent Labs     10/04/22  1111   *   K 4.5   CL 94*   CO2 32   BUN 27*   CREATININE 1.4*   GLUCOSE 157*     Hepatic:   Recent Labs     10/04/22  1111   AST 42*   ALT 31   BILITOT 1.1*   ALKPHOS 111     Lipids:   Lab Results   Component Value Date/Time    CHOL 174 05/07/2022 03:25 AM    CHOL 230 10/14/2019 01:45 PM    HDL 28 05/07/2022 03:25 AM    TRIG 130 05/07/2022 03:25 AM     Hemoglobin A1C:   Lab Results   Component Value Date/Time    LABA1C 5.5 06/22/2022 10:23 AM     TSH:   Lab Results   Component Value Date/Time    TSH 1.39 10/14/2019 01:45 PM     Troponin:   Lab Results   Component Value Date/Time    TROPONINT <0.010 10/04/2022 11:11 AM    TROPONINT 0.017 09/09/2022 04:25 PM    TROPONINT 0.027 06/21/2022 08:27 PM     Lactic Acid: No results for input(s): LACTA in the last 72 hours.   BNP:   Recent Labs     10/04/22  1111   PROBNP 288.3     UA:  Lab Results   Component Value Date/Time    NITRU NEGATIVE 06/22/2022 12:10 PM    COLORU YELLOW 06/22/2022 12:10 PM    [address] NONE SEEN 06/22/2022 12:10 PM    RBCUA 1 06/22/2022 12:10 PM    MUCUS RARE 06/22/2022 12:10 PM    TRICHOMONAS NONE SEEN 06/22/2022 12:10 PM    BACTERIA NEGATIVE 06/22/2022 12:10 PM    CLARITYU CLEAR 06/22/2022 12:10 PM    SPECGRAV 1.020 06/22/2022 12:10 PM    LEUKOCYTESUR NEGATIVE 06/22/2022 12:10 PM    UROBILINOGEN 1.0 06/22/2022 12:10 PM    [address] NEGATIVE 06/22/2022 12:10 PM    BLOODU TRACE 06/22/2022 12:10 PM    KETUA NEGATIVE 06/22/2022 12:10 PM     Urine Cultures: No results found for: LABURIN  Blood Cultures: No results found for: BC  No results found for: BLOODCULT2  Organism: No results found for: ORG    Imaging/Diagnostics Last 24 Hours   XR CHEST PORTABLE    Result Date: 10/4/2022  EXAMINATION: ONE XRAY VIEW OF THE CHEST 10/4/2022 10:50 am COMPARISON: 09/14/2022 HISTORY: ORDERING SYSTEM PROVIDED HISTORY: Chest Pain TECHNOLOGIST PROVIDED HISTORY: Reason for exam:->Chest Pain Reason for Exam: Chest Pain FINDINGS: There is complete opacification of the right hemithorax consistent with a large right pleural effusion. The left lung field is clear. Near complete opacification the right hemithorax consistent with large pleural effusion       I discussed this patient with the ED provider and Dr. Stefany Barlow. I did a review of patient's medical records, lab results and imaging conducted today. I personally reviewed patient's vital signs including pulse ox.      Electronically signed by Cisco Chong PA-C on 10/4/2022 at 1:01 PM

## 2022-10-04 NOTE — BRIEF OP NOTE
Brief Postoperative Note      Patient: Mina Holt  YOB: 1945  MRN: [id number]    Date of Procedure: 10/4/2022    Pre-Op Diagnosis: ascites     Post-Op Diagnosis: same       Procedure: paracentesis     Performing: Gavi Hodge MD    Anesthesia: local    Estimated Blood Loss (mL): Minimal    Complications: None    Specimens:   ascites    Findings: chylous appearing ascites obtained     Electronically signed by Josefina Whitten MD on 10/4/2022 at 1:27 PM

## 2022-10-04 NOTE — CARE COORDINATION
Patient identified as potential readmission. Last admission 9/9-9/14 for pleural effusion. Patient here today for shortness of breath. CM reviewed patient documentation. Patient from home with spouse. Patient is typically independent with ADLs. Patient has insurance and a PCP. Patient found today to have recurrent pleural effusion. Patient is requiring readmission and there were no alternatives to admission to explore at this time.

## 2022-10-04 NOTE — ED NOTES
ED TO INPATIENT SBAR HANDOFF    Patient Name: Solomon Sosa   :  1945  78 y.o. MRN:  [id number]  Preferred Name  [facility]  ED Room #:  LC11/MG-43  Caregiver Present yes   Restraints no   Sitter no   Sepsis Risk Score Sepsis Risk Score: 3.44    Situation  Code Status: Prior No additional code details. Allergies: Lisinopril, Zetia [ezetimibe], Atorvastatin, Codeine, Hydrochlorothiazide, Hydroxyzine, Lexapro [escitalopram oxalate], and Pravastatin  Weight: Patient Vitals for the past 96 hrs (Last 3 readings):   Weight   10/04/22 1028 234 lb (106.1 kg)     Arrived from: home  Chief Complaint:   Chief Complaint   Patient presents with    Shortness of [place] Problem/Diagnosis:  Principal Problem:    Pleural effusion  Resolved Problems:    * No resolved hospital problems. *    Imaging:   XR CHEST PORTABLE   Final Result   Near complete opacification the right hemithorax consistent with large   pleural effusion         IR US GUIDED PARACENTESIS    (Results Pending)     Abnormal labs:   Abnormal Labs Reviewed   CBC - Abnormal; Notable for the following components:       Result Value    RBC 4.11 (*)     Hemoglobin 13.4 (*)     Hematocrit 40.8 (*)     MCH 32.6 (*)     RDW 15.3 (*)     All other components within normal limits   COMPREHENSIVE METABOLIC PANEL - Abnormal; Notable for the following components:    Sodium 133 (*)     Chloride 94 (*)     BUN 27 (*)     Creatinine 1.4 (*)     Glucose 157 (*)     Albumin 3.2 (*)     Total Bilirubin 1.1 (*)     AST 42 (*)     GFR Non- 50 (*)     GFR  60 (*)     All other components within normal limits     Critical values: no     Abnormal Assessment Findings: Distended abdomen, Shortness of breath, fluid build up, thoracentesis scheduled for tomorrow.     Background  Hospital admissions in last 30 days?  no   History:   Past Medical History:   Diagnosis Date    Anxiety     Cirrhosis, alcoholic ([facility] [address]     Diabetes mellitus ([facility] [address] GERD (gastroesophageal reflux disease) 2000    GERD (gastroesophageal reflux disease)     Hyperlipidemia     Hypertension     Portal hypertension (HCC)     Pulmonary nodules     Sleep apnea     SOB (shortness of breath)     Thyroid disease        Assessment    Vitals/MEWS:        Vitals:    10/04/22 1103 10/04/22 1132 10/04/22 1201 10/04/22 1232   BP: (!) 147/96 124/72 132/71 133/73   Pulse: 70 69 69 70   Resp: 24 22 19    Temp: 98.5 °F (36.9 °C)      TempSrc: Oral      SpO2: (!) 88% (!) 89% 100% 100%   Weight:       Height:         FiO2 (%): nasal cannula for respiratory distress  O2 Flow Rate: O2 Device: None (Room air)  3L  Cardiac Rhythm:    Pain Assessment: no pain noted, Patient is complaining of dental pain unrelated and asked if he would be able to have tylenol later. [] Verbal [] Abraham Sharp Scale  Pain Scale: Pain Assessment  Pain Assessment: 0-10  Pain Level: 0  Patient's Stated Pain Goal: 0 - No pain  Last documented pain score (0-10 scale) Pain Level: 0  Last documented pain medication administered: none given. Mental Status: alert  C-SSRS: Risk of Suicide: No Risk  Bedside swallow:    Hali Coma Scale (GCS): Active LDA's:    PO Status: Regular  Pertinent or High Risk Medications/Drips: no   If Yes, please provide details: no  Pending Blood Product Administration: no     Blood Cultures: see ED pt care timeline or ED Event log    Recommendation    Pending orders none pending  Plan for Discharge (if known): Additional Comments: Patient hard of hearing, wife at bedside.    If any further questions, please call Sending RN at [phone]    Electronically signed by: Electronically signed by Alfred Zarate RN on 10/4/2022 at 12:58 PM       Alfred Zarate RN  10/04/22 6833

## 2022-10-04 NOTE — ED NOTES
Medication History  [facility]    Patient Name: Shannan Johnson 1945     Medication history has been completed by: Manfred Greene CPhT    Source(s) of information: patient's wife and insurance claims     Primary Care Physician: Katerina Vásquez MD     Pharmacy: [facility]    Allergies as of 10/04/2022 - Fully Reviewed 09/09/2022   Allergen Reaction Noted    Lisinopril Swelling     Zetia [ezetimibe] Swelling     Atorvastatin      Codeine Other (See Comments) 12/27/2018    Hydrochlorothiazide      Hydroxyzine      Lexapro [escitalopram oxalate]      Pravastatin          Prior to Admission medications    Medication Sig Start Date End Date Taking? Authorizing Provider   spironolactone (ALDACTONE) 100 MG tablet Take 100 mg by mouth 2 times daily   Yes Historical Provider, MD   furosemide (LASIX) 40 MG tablet Take 1 tablet by mouth in the morning and 1 tablet in the evening.  9/15/22   Rashmi Marin MD   albuterol sulfate HFA (VENTOLIN HFA) 108 (90 Base) MCG/ACT inhaler Inhale 2 puffs into the lungs 4 times daily as needed for Wheezing 7/26/22   Sascha Mata MD   nadolol (CORGARD) 40 MG tablet Take 40 mg by mouth nightly 7/20/22   MOHINDER Geronimo CNP   rifAXIMin Christian Sida) 550 MG tablet Take 1 tablet by mouth 2 times daily 7/12/22   MOHINDER Andrade CNP   levothyroxine (SYNTHROID) 50 MCG tablet Take 1 tablet by mouth Daily 3/30/22   Sascha Mata MD   lactulose [facility]) 10 GM/15ML solution Take 30 mLs by mouth 3 times daily Hold if having diarrhea 3/21/22   Susi Kussmaul, MD   DULoxetine (CYMBALTA) 60 MG extended release capsule  Take 60 mg by mouth daily TAKE 1 CAPSULE BY MOUTH ONCE DAILY 3/11/22 1/13/23  Sascha Mata MD   Multiple Vitamin (MULTI VITAMIN MENS PO) Take 1 tablet by mouth daily    Historical Provider, MD   ESOMEPRAZOLE MAGNESIUM PO Take 20 mg by mouth daily OTC    Historical Provider, MD   [place]-3 1000 MG CAPS Take 2,000 mg by mouth daily    Historical Provider, MD     Medications added or changed (ex. new medication, dosage change, interval change, formulation change):  Spironolactone dosage change from 50 mg to 100 mg BID  Fish oil dosage change from 1000 mg to 2000 mg    Comments:  Medication list reviewed with patient's wife and insurance claims verified. Per patient and wife, he has taken first dose of medications today.   Claims for alprazolam patient no longer taking  Claims for testosterone injection no longer receiving    To my knowledge the above medication history is accurate as of 10/4/2022 12:41 PM.   Melanie Guy CPhT   10/4/2022 12:41 PM

## 2022-10-04 NOTE — OR NURSING
PROCEDURE PERFORMED: paracentesis    PRIMARY INDICATION FOR PROCEDURE: ascites    INFORMED CONSENT:  Obtained prior to procedure. Consent placed in chart. JOVITA SCORE PRE PROCEDURE:  9      PT TRANSPORTED FROM:  ED30                                  TO THE IR ROOM:  Small                      ASSESSMENT: Pt very [place], but alert & oriented. On 3LNC    BARRIER PRECAUTIONS & STERILE TECHNIQUE:               Pt positioned supine for comfort. Warm blankets given. Pt placed on Cardiac Monitor. Pt prepped and draped in a sterile fashion with chlorhexadine.     PAIN/LOCAL ANESTHESIA/SEDATION MANAGEMENT:           Local: Lidocaine 1% given by Dr. Suzanne Jimenez:           ACCESS TIME: 9831          US/FLUORO: US 3 images          CATHETER USED: OneStep         STERILE DRESSINGS: guaze & tegaderm    SPECIMENS: 5950cc cloudy yellow ascitic fluid removed    EBL:   <1cc       FOLLOW- UP X-RAY: None    COMPLICATIONS/ OUTCOME: Pt tolerated well    JOVITA SCORE POST PROCEDURE:   9       REPORT CALLED TO: [facility] ED

## 2022-10-04 NOTE — ED NOTES
1222 paged hospitalist     Buddy Borne  10/04/22 4828    [phone] in [address] [facility] group returned call      Buddy Benjamin  10/04/22 8632

## 2022-10-04 NOTE — ED NOTES
IR called to update on patient. 5950 ml taken of of abdomen. Vitals stable. Patient being transported back to ED room.      Kristy Wen RN  10/04/22 4730

## 2022-10-05 ENCOUNTER — APPOINTMENT (OUTPATIENT)
Dept: INTERVENTIONAL RADIOLOGY/VASCULAR | Age: 77
DRG: 432 | End: 2022-10-05
Payer: MEDICARE

## 2022-10-05 ENCOUNTER — APPOINTMENT (OUTPATIENT)
Dept: GENERAL RADIOLOGY | Age: 77
DRG: 432 | End: 2022-10-05
Payer: MEDICARE

## 2022-10-05 LAB
ALBUMIN SERPL-MCNC: 3.4 GM/DL (ref 3.4–5)
ALP BLD-CCNC: 102 IU/L (ref 40–129)
ALT SERPL-CCNC: 28 U/L (ref 10–40)
ANION GAP SERPL CALCULATED.3IONS-SCNC: 6 MMOL/L (ref 4–16)
AST SERPL-CCNC: 40 IU/L (ref 15–37)
BASOPHILS ABSOLUTE: 0.1 K/CU MM
BASOPHILS RELATIVE PERCENT: 0.8 % (ref 0–1)
BILIRUB SERPL-MCNC: 1.1 MG/DL (ref 0–1)
BUN BLDV-MCNC: 25 MG/DL (ref 6–23)
CALCIUM SERPL-MCNC: 10.1 MG/DL (ref 8.3–10.6)
CHLORIDE BLD-SCNC: 98 MMOL/L (ref 99–110)
CO2: 35 MMOL/L (ref 21–32)
CREAT SERPL-MCNC: 1.3 MG/DL (ref 0.9–1.3)
DIFFERENTIAL TYPE: ABNORMAL
EKG ATRIAL RATE: 70 BPM
EKG DIAGNOSIS: NORMAL
EKG P AXIS: 15 DEGREES
EKG P-R INTERVAL: 154 MS
EKG Q-T INTERVAL: 416 MS
EKG QRS DURATION: 80 MS
EKG QTC CALCULATION (BAZETT): 449 MS
EKG R AXIS: 62 DEGREES
EKG T AXIS: 34 DEGREES
EKG VENTRICULAR RATE: 70 BPM
EOSINOPHILS ABSOLUTE: 0.3 K/CU MM
EOSINOPHILS RELATIVE PERCENT: 4.1 % (ref 0–3)
GFR AFRICAN AMERICAN: >60 ML/MIN/1.73M2
GFR NON-AFRICAN AMERICAN: 54 ML/MIN/1.73M2
GLUCOSE BLD-MCNC: 95 MG/DL (ref 70–99)
HCT VFR BLD CALC: 38.8 % (ref 42–52)
HEMOGLOBIN: 12.6 GM/DL (ref 13.5–18)
IMMATURE NEUTROPHIL %: 0.2 % (ref 0–0.43)
LYMPHOCYTES ABSOLUTE: 0.9 K/CU MM
LYMPHOCYTES RELATIVE PERCENT: 13.4 % (ref 24–44)
MCH RBC QN AUTO: 32.9 PG (ref 27–31)
MCHC RBC AUTO-ENTMCNC: 32.5 % (ref 32–36)
MCV RBC AUTO: 101.3 FL (ref 78–100)
MONOCYTES ABSOLUTE: 0.6 K/CU MM
MONOCYTES RELATIVE PERCENT: 9.3 % (ref 0–4)
NUCLEATED RBC %: 0 %
PDW BLD-RTO: 15.3 % (ref 11.7–14.9)
PLATELET # BLD: 160 K/CU MM (ref 140–440)
PMV BLD AUTO: 10.2 FL (ref 7.5–11.1)
POTASSIUM SERPL-SCNC: 4.3 MMOL/L (ref 3.5–5.1)
RBC # BLD: 3.83 M/CU MM (ref 4.6–6.2)
SEGMENTED NEUTROPHILS ABSOLUTE COUNT: 4.6 K/CU MM
SEGMENTED NEUTROPHILS RELATIVE PERCENT: 72.2 % (ref 36–66)
SODIUM BLD-SCNC: 139 MMOL/L (ref 135–145)
TOTAL IMMATURE NEUTOROPHIL: 0.01 K/CU MM
TOTAL NUCLEATED RBC: 0 K/CU MM
TOTAL PROTEIN: 6.8 GM/DL (ref 6.4–8.2)
WBC # BLD: 6.4 K/CU MM (ref 4–10.5)

## 2022-10-05 PROCEDURE — 2580000003 HC RX 258: Performed by: PHYSICIAN ASSISTANT

## 2022-10-05 PROCEDURE — 88305 TISSUE EXAM BY PATHOLOGIST: CPT | Performed by: PATHOLOGY

## 2022-10-05 PROCEDURE — 88305 TISSUE EXAM BY PATHOLOGIST: CPT

## 2022-10-05 PROCEDURE — 36415 COLL VENOUS BLD VENIPUNCTURE: CPT

## 2022-10-05 PROCEDURE — 6370000000 HC RX 637 (ALT 250 FOR IP): Performed by: PHYSICIAN ASSISTANT

## 2022-10-05 PROCEDURE — 32555 ASPIRATE PLEURA W/ IMAGING: CPT

## 2022-10-05 PROCEDURE — 85025 COMPLETE CBC W/AUTO DIFF WBC: CPT

## 2022-10-05 PROCEDURE — 6360000002 HC RX W HCPCS: Performed by: INTERNAL MEDICINE

## 2022-10-05 PROCEDURE — 2700000000 HC OXYGEN THERAPY PER DAY

## 2022-10-05 PROCEDURE — 88108 CYTOPATH CONCENTRATE TECH: CPT

## 2022-10-05 PROCEDURE — 1200000000 HC SEMI PRIVATE

## 2022-10-05 PROCEDURE — 80053 COMPREHEN METABOLIC PANEL: CPT

## 2022-10-05 PROCEDURE — 94761 N-INVAS EAR/PLS OXIMETRY MLT: CPT

## 2022-10-05 PROCEDURE — 71045 X-RAY EXAM CHEST 1 VIEW: CPT

## 2022-10-05 PROCEDURE — 88108 CYTOPATH CONCENTRATE TECH: CPT | Performed by: PATHOLOGY

## 2022-10-05 PROCEDURE — 93010 ELECTROCARDIOGRAM REPORT: CPT | Performed by: INTERNAL MEDICINE

## 2022-10-05 PROCEDURE — 99222 1ST HOSP IP/OBS MODERATE 55: CPT | Performed by: INTERNAL MEDICINE

## 2022-10-05 PROCEDURE — 32555 ASPIRATE PLEURA W/ IMAGING: CPT | Performed by: INTERNAL MEDICINE

## 2022-10-05 PROCEDURE — 0W993ZZ DRAINAGE OF RIGHT PLEURAL CAVITY, PERCUTANEOUS APPROACH: ICD-10-PCS | Performed by: INTERNAL MEDICINE

## 2022-10-05 PROCEDURE — 99024 POSTOP FOLLOW-UP VISIT: CPT | Performed by: INTERNAL MEDICINE

## 2022-10-05 PROCEDURE — P9047 ALBUMIN (HUMAN), 25%, 50ML: HCPCS | Performed by: INTERNAL MEDICINE

## 2022-10-05 PROCEDURE — 87205 SMEAR GRAM STAIN: CPT

## 2022-10-05 PROCEDURE — 87070 CULTURE OTHR SPECIMN AEROBIC: CPT

## 2022-10-05 RX ORDER — ALBUMIN (HUMAN) 12.5 G/50ML
25 SOLUTION INTRAVENOUS ONCE
Status: COMPLETED | OUTPATIENT
Start: 2022-10-05 | End: 2022-10-05

## 2022-10-05 RX ADMIN — SODIUM CHLORIDE, PRESERVATIVE FREE 10 ML: 5 INJECTION INTRAVENOUS at 08:45

## 2022-10-05 RX ADMIN — SPIRONOLACTONE 100 MG: 50 TABLET ORAL at 08:42

## 2022-10-05 RX ADMIN — DULOXETINE 60 MG: 30 CAPSULE, DELAYED RELEASE ORAL at 08:42

## 2022-10-05 RX ADMIN — FUROSEMIDE 40 MG: 40 TABLET ORAL at 16:52

## 2022-10-05 RX ADMIN — PANTOPRAZOLE SODIUM 40 MG: 40 TABLET, DELAYED RELEASE ORAL at 06:24

## 2022-10-05 RX ADMIN — LACTULOSE 20 G: 10 SOLUTION ORAL at 13:23

## 2022-10-05 RX ADMIN — LACTULOSE 20 G: 10 SOLUTION ORAL at 08:42

## 2022-10-05 RX ADMIN — ALBUMIN (HUMAN) 25 G: 0.25 INJECTION, SOLUTION INTRAVENOUS at 21:59

## 2022-10-05 RX ADMIN — SODIUM CHLORIDE, PRESERVATIVE FREE 10 ML: 5 INJECTION INTRAVENOUS at 21:26

## 2022-10-05 RX ADMIN — LEVOTHYROXINE SODIUM 50 MCG: 0.05 TABLET ORAL at 06:24

## 2022-10-05 RX ADMIN — FUROSEMIDE 40 MG: 40 TABLET ORAL at 08:42

## 2022-10-05 RX ADMIN — RIFAXIMIN 550 MG: 550 TABLET ORAL at 08:42

## 2022-10-05 RX ADMIN — SPIRONOLACTONE 100 MG: 50 TABLET ORAL at 21:27

## 2022-10-05 RX ADMIN — RIFAXIMIN 550 MG: 550 TABLET ORAL at 21:25

## 2022-10-05 RX ADMIN — NADOLOL 40 MG: 20 TABLET ORAL at 21:25

## 2022-10-05 ASSESSMENT — PAIN SCALES - GENERAL: PAINLEVEL_OUTOF10: 0

## 2022-10-05 NOTE — PROGRESS NOTES
4 Eyes Skin Assessment     NAME:  Dara Nelson OF BIRTH:  1945  MEDICAL RECORD NUMBER:  [id number]    The patient is being assess for  Admission    I agree that 2 RN's have performed a thorough Head to Toe Skin Assessment on the patient. ALL assessment sites listed below have been assessed. Areas assessed by both nurses:    Head, Face, Ears, Shoulders, Back, Chest, Arms, Elbows, Hands, Sacrum. Buttock, Coccyx, Ischium, and Legs. Feet and Heels        Does the Patient have a Wound?  No noted wound(s)       Andre Prevention initiated:  No   Wound Care Orders initiated:  No    Pressure Injury (Stage 3,4, Unstageable, DTI, NWPT, and Complex wounds) if present place referral/consult order under [de-identified] No    New and Established Ostomies if present place consult order under : No      Nurse 1 eSignature: Electronically signed by Giovani Basurto RN on 10/5/22 at 3:16 AM EDT    **SHARE this note so that the co-signing nurse is able to place an eSignature**    Nurse 2 eSignature: Electronically signed by Harriet Noe RN on 10/5/22 at 3:18 AM EDT

## 2022-10-05 NOTE — CONSULTS
Pulmonary Consult Note      Reason for Consult:recurrent pleural effusion   Requesting Physician: Antwan Maurer PA-C  Subjective:   CHIEF COMPLAINT :SOB    Patient Active Problem List    Diagnosis Date Noted    Pleural effusion 10/04/2022     Priority: Medium    Recurrent pleural effusion on right 09/09/2022     Priority: Medium    Hypersomnia 08/15/2022     Priority: Medium    Ex-smoker 08/15/2022     Priority: Medium    Obesity (BMI 30-39.9) 08/15/2022     Priority: Medium    Type 2 diabetes mellitus with chronic kidney disease 07/12/2022     Priority: Medium    Thyroid disease 06/21/2022     Priority: Medium    Cellulitis of left lower extremity 06/08/2022     Priority: Medium    Type 2 diabetes mellitus without complication, without long-term current use of insulin ([address] 06/08/2022     Priority: Medium    Generalized weakness      Priority: Medium    Oropharyngeal dysphagia      Priority: Medium    Acute kidney injury superimposed on chronic kidney disease ([address]      Priority: Medium    Acute respiratory failure ([address] 05/04/2022     Priority: Medium    Recurrent right pleural effusion 05/02/2022     Priority: Medium    Sepsis ([address] 03/19/2022    Hepatic encephalopathy 03/19/2022    Hearing loss 03/11/2022    Cirrhosis of liver with ascites ([address]     Secondary esophageal varices without bleeding ([address]     Gastric polyps     SOB (shortness of breath) 09/13/2021    Portal hypertension ([address] 08/30/2021    JENNIFER (obstructive sleep apnea) 08/09/2021    Bilateral hearing loss 05/07/2021    Increased ammonia level 05/07/2021    Class 3 severe obesity with body mass index (BMI) of 40.0 to 44.9 in adult [facility]) 09/21/2020    Hyperglycemia 12/04/2019    Acquired hypothyroidism 12/04/2019    Pulmonary nodules 09/09/2019    Ascites due to alcoholic cirrhosis ([address] 22/63/2565    Mixed hyperlipidemia 08/08/2019    Hypertension 08/08/2019    History of alcohol abuse 08/08/2019    Gastroesophageal reflux disease 08/08/2019 Anxiety 08/08/2019    Shortness of breath 05/26/2019    History of colonic polyps 01/15/2019        HPI:                The patient is a 78 y.o. male with significant past medical history of  alcoholic cirrhosis, hyperlipidemia, hypertension  presents with complaints of SOB x 2 days. He was on 2 L/min. He was found to have ascites and 6 L white ascitic fluid drained. His CXR showed complete whiteout of the right chest. He is sitting in the bed. He is in mild resp distress. He has no chest pain, no fever, no abd pain. He is on\ Lactulose, Rifaxamin and diuresis. Past Medical History:      Diagnosis Date    Anxiety     Cirrhosis, alcoholic ([address]     Diabetes mellitus ([address]     GERD (gastroesophageal reflux disease) 2000    GERD (gastroesophageal reflux disease)     Hyperlipidemia     Hypertension     Portal hypertension (HCC)     Pulmonary nodules     Sleep apnea     SOB (shortness of breath)     Thyroid disease       Past Surgical History:        Procedure Laterality Date    APPENDECTOMY      CHOLECYSTECTOMY      COLONOSCOPY      ENDOSCOPY, COLON, DIAGNOSTIC      FOOT SURGERY      needle removed,not sure which foot, per wife. TONSILLECTOMY      UPPER GASTROINTESTINAL ENDOSCOPY N/A 1/19/2022    EGD BIOPSY performed by Vince Foster MD at [address] [place] [address]       Current Medications:     sodium chloride flush  5-40 mL IntraVENous 2 times per day    [START ON 10/5/2022] DULoxetine  60 mg Oral Daily    furosemide  40 mg Oral BID    lactulose  20 g Oral TID    [START ON 10/5/2022] levothyroxine  50 mcg Oral Daily    nadolol  40 mg Oral Nightly    rifAXIMin  550 mg Oral BID    spironolactone  100 mg Oral BID    [START ON 10/5/2022] pantoprazole  40 mg Oral QAM AC     Allergies:    Social History:    TOBACCO:   reports that he quit smoking about 46 years ago. His smoking use included cigarettes. He has a 14.00 pack-year smoking history.  He has never used smokeless tobacco.  ETOH:   reports that he does not currently use alcohol. Patient currently lives independently    Family History:       Problem Relation Age of Onset    Hypertension Brother     Diabetes Other        REVIEW OF SYSTEMS:    CONSTITUTIONAL:  negative for fevers, chills, diaphoresis, activity change, appetite change, fatigue, night sweats and unexpected weight change. EYES:  negative for blurred vision, eye discharge, visual disturbance and icterus  HEENT:  negative for hearing loss, tinnitus, ear drainage, sinus pressure, nasal congestion, epistaxis and snoring  RESPIRATORY:  See HPI  CARDIOVASCULAR:  negative for chest pain, palpitations, exertional chest pressure/discomfort, edema, syncope  GASTROINTESTINAL:  negative for nausea, vomiting, diarrhea, constipation, blood in stool and abdominal pain  GENITOURINARY:  negative for frequency, dysuria, urinary incontinence, decreased urine volume, and hematuria  HEMATOLOGIC/LYMPHATIC:  negative for easy bruising, bleeding and lymphadenopathy  ALLERGIC/IMMUNOLOGIC:  negative for recurrent infections, angioedema, anaphylaxis and drug reactions  ENDOCRINE:  negative for weight changes and diabetic symptoms including polyuria, polydipsia and polyphagia  MUSCULOSKELETAL:  negative for  pain, joint swelling, decreased range of motion and muscle weakness  NEUROLOGICAL:  negative for headaches, slurred speech, unilateral weakness  PSYCHIATRIC/BEHAVIORAL: negative for hallucinations, behavioral problems, confusion and agitation.      Objective:   PHYSICAL EXAM:      VITALS:  BP (!) 152/81   Pulse 73   Temp 98.5 °F (36.9 °C) (Oral)   Resp 20   Ht 5' 8\" (1.727 m)   Wt 234 lb (106.1 kg)   SpO2 100%   BMI 35.58 kg/m²   24HR INTAKE/OUTPUT:  No intake or output data in the 24 hours ending 10/04/22 2047  CURRENT PULSE OXIMETRY:  SpO2: 100 %  24HR PULSE OXIMETRY RANGE:  SpO2  Av.6 %  Min: 88 %  Max: 100 %    CONSTITUTIONAL:  awake, alert, cooperative, no apparent distress, and appears stated age  NECK: Supple, symmetrical, trachea midline, no adenopathy, thyroid symmetric, not enlarged and no tenderness, skin normal  LUNGS:  Decreased air entry right base  CARDIOVASCULAR:  normal S1 and S2, no edema and no JVD  ABDOMEN:  normal bowel sounds, non-distended and no masses palpated, and no tenderness to palpation. No hepatospleenomegaly  LYMPHADENOPATHY:  no axillary or supraclavicular adenopathy. No cervical adnenopathy  PSYCHIATRIC: Oriented to person place and time. No obvious depression or anxiety. MUSCULOSKELETAL: No obvious misalignment or effusion of the joints. No clubbing, cyanosis of the digits. SKIN:  normal skin color, texture, turgor and no redness, warmth, or swelling.  No palpable nodules    DATA:    Old records have been reviewed  CBC with Differential:    Lab Results   Component Value Date/Time    WBC 7.6 10/04/2022 11:11 AM    RBC 4.11 10/04/2022 11:11 AM    HGB 13.4 10/04/2022 11:11 AM    HCT 40.8 10/04/2022 11:11 AM     10/04/2022 11:11 AM    MCV 99.3 10/04/2022 11:11 AM    MCH 32.6 10/04/2022 11:11 AM    MCHC 32.8 10/04/2022 11:11 AM    RDW 15.3 10/04/2022 11:11 AM    SEGSPCT 69.3 09/12/2022 03:17 AM    LYMPHOPCT 12.1 09/12/2022 03:17 AM    MONOPCT 13.6 09/12/2022 03:17 AM    BASOPCT 0.8 09/12/2022 03:17 AM    MONOSABS 1.2 09/12/2022 03:17 AM    LYMPHSABS 1.1 09/12/2022 03:17 AM    EOSABS 0.3 09/12/2022 03:17 AM    BASOSABS 0.1 09/12/2022 03:17 AM    DIFFTYPE AUTOMATED DIFFERENTIAL 09/12/2022 03:17 AM     BMP:    Lab Results   Component Value Date/Time     10/04/2022 11:11 AM    K 4.5 10/04/2022 11:11 AM    CL 94 10/04/2022 11:11 AM    CO2 32 10/04/2022 11:11 AM    BUN 27 10/04/2022 11:11 AM    CREATININE 1.4 10/04/2022 11:11 AM    CALCIUM 10.3 10/04/2022 11:11 AM    GFRAA 60 10/04/2022 11:11 AM    LABGLOM 50 10/04/2022 11:11 AM    GLUCOSE 157 10/04/2022 11:11 AM     Hepatic Function Panel:    Lab Results   Component Value Date/Time    ALKPHOS 111 10/04/2022 11:11 AM    ALT 31 10/04/2022 11:11 AM    AST 42 10/04/2022 11:11 AM    PROT 7.7 10/04/2022 11:11 AM    BILITOT 1.1 10/04/2022 11:11 AM    BILIDIR 0.2 07/15/2022 12:01 PM    IBILI 0.5 07/15/2022 12:01 PM     ABG:    Lab Results   Component Value Date/Time    MWB8LUP 38.1 06/23/2022 11:00 AM    WCJ6OHQ 63.0 06/23/2022 11:00 AM    PO2ART 73 06/23/2022 11:00 AM       Cultures:   Pending        Radiology Review:    Near complete opacification the right hemithorax consistent with large   pleural effusion             Assessment/Plan       Patient Active Problem List    Diagnosis Date Noted    Pleural effusion 10/04/2022     Priority: Medium    Recurrent pleural effusion on right 09/09/2022     Priority: Medium    Hypersomnia 08/15/2022     Priority: Medium    Ex-smoker 08/15/2022     Priority: Medium    Obesity (BMI 30-39.9) 08/15/2022     Priority: Medium    Type 2 diabetes mellitus with chronic kidney disease 07/12/2022     Priority: Medium    Thyroid disease 06/21/2022     Priority: Medium    Cellulitis of left lower extremity 06/08/2022     Priority: Medium    Type 2 diabetes mellitus without complication, without long-term current use of insulin ([address] 06/08/2022     Priority: Medium    Generalized weakness      Priority: Medium    Oropharyngeal dysphagia      Priority: Medium    Acute kidney injury superimposed on chronic kidney disease ([address]      Priority: Medium    Acute respiratory failure ([address] 05/04/2022     Priority: Medium    Recurrent right pleural effusion 05/02/2022     Priority: Medium    Sepsis ([address] 03/19/2022    Hepatic encephalopathy 03/19/2022    Hearing loss 03/11/2022    Cirrhosis of liver with ascites ([address]     Secondary esophageal varices without bleeding (HCC)     Gastric polyps     SOB (shortness of breath) 09/13/2021    Portal hypertension ([address] 08/30/2021    JENNIFER (obstructive sleep apnea) 08/09/2021    Bilateral hearing loss 05/07/2021    Increased ammonia level 05/07/2021    Class 3 severe obesity with body mass index (BMI) of 40.0 to 44.9 in adult [facility]) 09/21/2020    Hyperglycemia 12/04/2019    Acquired hypothyroidism 12/04/2019    Pulmonary nodules 09/09/2019    Ascites due to alcoholic cirrhosis ([facility] 58/06/6554    Mixed hyperlipidemia 08/08/2019    Hypertension 08/08/2019    History of alcohol abuse 08/08/2019    Gastroesophageal reflux disease 08/08/2019    Anxiety 08/08/2019    Shortness of breath 05/26/2019    History of colonic polyps 01/15/2019   CANDIE on CKD  Alcoholic Cirrhosis with Portal HTN  Recurrent ascites  Recurrent Right Pleural effusion 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

## 2022-10-05 NOTE — PROCEDURES
Jane Tobias is a 78 y.o. male patient. 1. Recurrent right pleural effusion    2. Ascites due to alcoholic cirrhosis ([facility]    3. Hypoxia      Past Medical History:   Diagnosis Date    Anxiety     Cirrhosis, alcoholic ([facility]     Diabetes mellitus ([facility]     GERD (gastroesophageal reflux disease) 2000    GERD (gastroesophageal reflux disease)     Hyperlipidemia     Hypertension     Portal hypertension (HCC)     Pulmonary nodules     Sleep apnea     SOB (shortness of breath)     Thyroid disease      Blood pressure 111/69, pulse 72, temperature 98.3 °F (36.8 °C), temperature source Oral, resp. rate 18, height 5' 8\" (1.727 m), weight 234 lb (106.1 kg), SpO2 99 %. Right thoracentesis  INDICATION: Recurrent right pleural effusion  ESTIMATED BLOOD LOSS: None  Thoracentesis    Date/Time: 10/5/2022 2:20 PM  Performed by: Walter Prieto MD  Authorized by: Walter Prieto MD   Consent: Verbal consent obtained. Written consent obtained. Risks and benefits: risks, benefits and alternatives were discussed  Consent given by: patient  Patient understanding: patient states understanding of the procedure being performed  Patient consent: the patient's understanding of the procedure matches consent given  Procedure consent: procedure consent matches procedure scheduled  Relevant documents: relevant documents present and verified  Test results: test results available and properly labeled  Site marked: the operative site was marked  Imaging studies: imaging studies available  Patient identity confirmed: verbally with patient and arm band  Time out: Immediately prior to procedure a \"time out\" was called to verify the correct patient, procedure, equipment, support staff and site/side marked as required.   Body area: trunk    1% Lidocaine 10 ml  Removed 1950 ml of milky fluid  CXR stat  Pleural fluid analysis      Walter Prieto MD  10/5/2022

## 2022-10-05 NOTE — PROGRESS NOTES
V2.0  [facility] Hospitalist Progress Note      Name:  Samantha Bustamante /Age/Sex: 1945  (78 y.o. male)   MRN & CSN:  [id number] & [id number] Encounter Date/Time: 10/5/2022 9:40 AM EDT    Location:  -A PCP: Jose A De La Vega, Alberto Infante Day: 2    Assessment and Plan:   Samantha Bustamante is a 78 y.o. male with a past medical history of alcoholic cirrhosis, hyperlipidemia, hypertension who presents with Pleural effusion. Recurrent right-sided pleural effusion  Acute hypoxic respiratory failure   -Multiple prior thoracenteses, most recent thoracentesis 22  -SpO2 88% on room air in ED, currently requiring 2L nasal cannula and does wear O2 intermittently at home  -consult pulmonology, Dr. Carmen Clancy, planning for thoracentesis today      Recurrent ascites  -Multiple prior paracenteses, most recent 22  -IR consulted from ED, status post paracentesis 10/3/22 with 6L off  - albumin given yesterday   - continue home diuretics     Alcoholic liver cirrhosis  Portal hypertension  History of esophageal varices  History of hepatic encephalopathy  -Continue nadolol, spironolactone and Lasix  -Continue rifaximin, lactulose  -follows with Dr. Uri Kerns, followed on recent admission and deemed not a candidate for TIPS or transplant     CKDIII  -baseline Cr ~1.2-1.3, remains near baseline  - monitor creatinine      Hypertension   -BP stable, occasionally soft   - continue home nadalol, Aldactone, Lasix with hold parameters     Hypothyroidism   - continue home synthroid      History of alcohol abuse   - no longer drinking, patient reports 203 days since last drink    This patient was discussed with Dr. Blanca Lora. He/She was agreeable with the plan and management as dictated above. Current Living situation: home with wife  Expected Disposition: same  Estimated D/C: 1-2 days    Diet ADULT DIET;  Regular; Low Fat/Low Chol/High Fiber/2 gm Na; 2000 ml   DVT Prophylaxis [x] Lovenox, []  Heparin, [] SCDs, [] Ambulation,  [] Eliquis, [] Xarelto []Coumadin   Code Status Full Code   Disposition Patient requires continued admission due to awaiting thoracentesis   Surrogate Decision Maker/ POA      Subjective:     Chief Complaint: Shortness of Breath     Patient seen and examined at bedside. States breathing much improved today. Denies chest pain. Discussed plan of care including thoracentesis today, patient agreeable. Review of Systems:    Ten point ROS reviewed negative, unless as noted above    Objective:   No intake or output data in the 24 hours ending 10/05/22 0940     Vitals:   Vitals:    10/05/22 0747   BP: 100/66   Pulse: 65   Resp: 19   Temp: 98.4 °F (36.9 °C)   SpO2: 100%       Physical Exam:   PHYSICAL EXAM   GEN Awake male, sitting upright in bed in no apparent distress. Appears given age. EYES Pupils are equally round. Gaze conjugate. HENT Mucous membranes are moist. [place]. NECK Supple, no apparent thyromegaly or masses. RESP Respirations even and unlabored on 2L NC, clear to auscultation bilaterally   CARDIO/VASC Regular rate without appreciable murmurs. 1+ BLE pitting edema. GI Abdomen is soft without significant tenderness, masses, or guarding.   Schilling catheter is not present. MSK No gross joint deformities. SKIN Normal coloration, warm, dry. NEURO Cranial nerves appear grossly intact, normal speech, no lateralizing weakness. PSYCH Awake, alert, oriented x 4. Affect appropriate.     Medications:   Medications:    sodium chloride flush  5-40 mL IntraVENous 2 times per day    DULoxetine  60 mg Oral Daily    furosemide  40 mg Oral BID    lactulose  20 g Oral TID    levothyroxine  50 mcg Oral Daily    nadolol  40 mg Oral Nightly    rifAXIMin  550 mg Oral BID    spironolactone  100 mg Oral BID    pantoprazole  40 mg Oral QAM AC      Infusions:    sodium chloride       PRN Meds: sodium chloride flush, 5-40 mL, PRN  sodium chloride, , PRN  ondansetron, 4 mg, Q8H PRN   Or  ondansetron, 4 mg, Q6H PRN  polyethylene glycol, 17 g, Daily PRN  acetaminophen, 650 mg, Q6H PRN   Or  acetaminophen, 650 mg, Q6H PRN  albuterol sulfate HFA, 2 puff, Q6H PRN        Labs      Recent Results (from the past 24 hour(s))   CBC    Collection Time: 10/04/22 11:11 AM   Result Value Ref Range    WBC 7.6 4.0 - 10.5 K/CU MM    RBC 4.11 (L) 4.6 - 6.2 M/CU MM    Hemoglobin 13.4 (L) 13.5 - 18.0 GM/DL    Hematocrit 40.8 (L) 42 - 52 %    MCV 99.3 78 - 100 FL    MCH 32.6 (H) 27 - 31 PG    MCHC 32.8 32.0 - 36.0 %    RDW 15.3 (H) 11.7 - 14.9 %    Platelets 851 692 - 605 K/CU MM    MPV 10.7 7.5 - 11.1 FL   Comprehensive Metabolic Panel    Collection Time: 10/04/22 11:11 AM   Result Value Ref Range    Sodium 133 (L) 135 - 145 MMOL/L    Potassium 4.5 3.5 - 5.1 MMOL/L    Chloride 94 (L) 99 - 110 mMol/L    CO2 32 21 - 32 MMOL/L    BUN 27 (H) 6 - 23 MG/DL    Creatinine 1.4 (H) 0.9 - 1.3 MG/DL    Glucose 157 (H) 70 - 99 MG/DL    Calcium 10.3 8.3 - 10.6 MG/DL    Albumin 3.2 (L) 3.4 - 5.0 GM/DL    Total Protein 7.7 6.4 - 8.2 GM/DL    Total Bilirubin 1.1 (H) 0.0 - 1.0 MG/DL    ALT 31 10 - 40 U/L    AST 42 (H) 15 - 37 IU/L    Alkaline Phosphatase 111 40 - 129 IU/L    GFR Non- 50 (L) >60 mL/min/1.73m2    GFR  60 (L) >60 mL/min/1.73m2    Anion Gap 7 4 - 16   Troponin    Collection Time: 10/04/22 11:11 AM   Result Value Ref Range    Troponin T <0.010 <0.01 NG/ML   Magnesium    Collection Time: 10/04/22 11:11 AM   Result Value Ref Range    Magnesium 1.9 1.8 - 2.4 mg/dl   Lipase    Collection Time: 10/04/22 11:11 AM   Result Value Ref Range    Lipase 42 13 - 60 IU/L   Brain Natriuretic Peptide    Collection Time: 10/04/22 11:11 AM   Result Value Ref Range    Pro-.3 <300 PG/ML   EKG 12 Lead    Collection Time: 10/04/22 12:46 PM   Result Value Ref Range    Ventricular Rate 70 BPM    Atrial Rate 70 BPM    P-R Interval 154 ms    QRS Duration 80 ms    Q-T Interval 416 ms    QTc Calculation (Bazett) 449 ms    P Axis 15 degrees    R Axis 62 degrees    T Axis 34 degrees    Diagnosis       Normal sinus rhythm  Low voltage QRS  Borderline ECG  When compared with ECG of 09-SEP-2022 16:23,  No significant change was found     Ammonia    Collection Time: 10/04/22  4:58 PM   Result Value Ref Range    Ammonia 56 16 - 60 UMOL/L   Protime/INR & PTT    Collection Time: 10/04/22  4:58 PM   Result Value Ref Range    Protime 12.8 11.7 - 14.5 SECONDS    INR 0.99 INDEX    aPTT 32.7 25.1 - 37.1 SECONDS   Comprehensive Metabolic Panel w/ Reflex to MG    Collection Time: 10/05/22  4:10 AM   Result Value Ref Range    Sodium 139 135 - 145 MMOL/L    Potassium 4.3 3.5 - 5.1 MMOL/L    Chloride 98 (L) 99 - 110 mMol/L    CO2 35 (H) 21 - 32 MMOL/L    BUN 25 (H) 6 - 23 MG/DL    Creatinine 1.3 0.9 - 1.3 MG/DL    Glucose 95 70 - 99 MG/DL    Calcium 10.1 8.3 - 10.6 MG/DL    Albumin 3.4 3.4 - 5.0 GM/DL    Total Protein 6.8 6.4 - 8.2 GM/DL    Total Bilirubin 1.1 (H) 0.0 - 1.0 MG/DL    ALT 28 10 - 40 U/L    AST 40 (H) 15 - 37 IU/L    Alkaline Phosphatase 102 40 - 129 IU/L    GFR Non- 54 (L) >60 mL/min/1.73m2    GFR African American >60 >60 mL/min/1.73m2    Anion Gap 6 4 - 16   CBC with Auto Differential    Collection Time: 10/05/22  4:10 AM   Result Value Ref Range    WBC 6.4 4.0 - 10.5 K/CU MM    RBC 3.83 (L) 4.6 - 6.2 M/CU MM    Hemoglobin 12.6 (L) 13.5 - 18.0 GM/DL    Hematocrit 38.8 (L) 42 - 52 %    .3 (H) 78 - 100 FL    MCH 32.9 (H) 27 - 31 PG    MCHC 32.5 32.0 - 36.0 %    RDW 15.3 (H) 11.7 - 14.9 %    Platelets 583 121 - 583 K/CU MM    MPV 10.2 7.5 - 11.1 FL    Differential Type AUTOMATED DIFFERENTIAL     Segs Relative 72.2 (H) 36 - 66 %    Lymphocytes % 13.4 (L) 24 - 44 %    Monocytes % 9.3 (H) 0 - 4 %    Eosinophils % 4.1 (H) 0 - 3 %    Basophils % 0.8 0 - 1 %    Segs Absolute 4.6 K/CU MM    Lymphocytes Absolute 0.9 K/CU MM    Monocytes Absolute 0.6 K/CU MM    Eosinophils Absolute 0.3 K/CU MM    Basophils Absolute 0.1 K/CU MM Nucleated RBC % 0.0 %    Total Nucleated RBC 0.0 K/CU MM    Total Immature Neutrophil 0.01 K/CU MM    Immature Neutrophil % 0.2 0 - 0.43 %        Imaging/Diagnostics Last 24 Hours   XR CHEST PORTABLE    Result Date: 10/4/2022  EXAMINATION: ONE XRAY VIEW OF THE CHEST 10/4/2022 10:50 am COMPARISON: 09/14/2022 HISTORY: ORDERING SYSTEM PROVIDED HISTORY: Chest Pain TECHNOLOGIST PROVIDED HISTORY: Reason for exam:->Chest Pain Reason for Exam: Chest Pain FINDINGS: There is complete opacification of the right hemithorax consistent with a large right pleural effusion. The left lung field is clear.      Near complete opacification the right hemithorax consistent with large pleural effusion       Electronically signed by Jerry Sagastume PA-C on 10/5/2022 at 9:40 AM

## 2022-10-05 NOTE — PROGRESS NOTES
Physician Progress Note      PATIENT:               Kaitlynn Dupree  CSN #:                  [id number]  :                       1945  ADMIT DATE:       10/4/2022 11:02 AM  DISCH DATE:  RESPONDING  PROVIDER #:        Georgiana Sanabria        QUERY TEXT:    Type of Pleural Effusion: Please provide further specificity, if known.     Clinical indicators include: opacification, pleural effusions, pleural   effusion  Options provided:  -- Pleural effusion caused by malignancy  -- Pleural effusion caused by congestive heart failure  -- Hydrothorax or chylous effusion  -- Hemothorax  -- Influenzal pleural effusion  -- Other - I will add my own diagnosis  -- Disagree - Not applicable / Not valid  -- Disagree - Clinically Unable to determine / Unknown        PROVIDER RESPONSE TEXT:    Pleural effusion caused by cirrhosis      Electronically signed byFelicity Locket 10/5/2022 11:03 AM

## 2022-10-05 NOTE — PROGRESS NOTES
Comprehensive Nutrition Assessment    Type and Reason for Visit:  Initial, Positive Nutrition Screen (weight loss reported)    Nutrition Recommendations/Plan:   Continue low sodium diet with fluid restriction as needed   Will continue to follow up during stay      Malnutrition Assessment:  Malnutrition Status: At risk for malnutrition (Comment) (10/05/22 1128)    Context:  Chronic Illness       Nutrition Assessment:    Admit with shortness of breath, pleural effusion. Hx of liver cirrhosis. Currently on cardiac, low sodium diet with 2000 ml fluid restriction. Wife present in room, reports patient usually with good appetite and on low sodium diet at home. Per wife patient comes in monthly for paracentesis and thoracentesis and will likely be short stay. Weight fluctuates due to fluid changes. Will follow at moderate nutrition risk at this time. Nutrition Related Findings:    patient resting in bed, hard of hearing, s/p paracentesis 5950 ml, plan for thoracentesis today,  hx DM  last HbA1c 5.5%,     meds noted: lasix, lactulose, rifaximin, spironolactone Wound Type: None       Current Nutrition Intake & Therapies:    Average Meal Intake: Unable to assess  Average Supplements Intake: None Ordered  ADULT DIET; Regular; Low Fat/Low Chol/High Fiber/2 gm Na; 2000 ml    Anthropometric Measures:  Height: 5' 8\" (172.7 cm)  Ideal Body Weight (IBW): 154 lbs (70 kg)       Current Body Weight: 233 lb 14.5 oz (106.1 kg), 151.9 % IBW.  Weight Source: Stated  Current BMI (kg/m2): 35.6  Usual Body Weight: 240 lb (108.9 kg) (hx in May)  % Weight Change (Calculated): -2.5  Weight Adjustment For: No Adjustment                 BMI Categories: Obese Class 2 (BMI 35.0 -39.9)    Estimated Daily Nutrient Needs:  Energy Requirements Based On: Kcal/kg  Weight Used for Energy Requirements: Ideal (current stated weight, ? accuracy)  Energy (kcal/day): 6129-7766 (25-30 jonatan/kg)  Weight Used for Protein Requirements: Ideal  Protein (g/day): 77-91 (1.1-1.3 g/kg)  Method Used for Fluid Requirements: 1 ml/kcal  Fluid (ml/day): 2000    Nutrition Diagnosis:   Predicted inadequate energy intake related to other (comment) (potential reduced appetite in illness) as evidenced by other (comment) (limited po data)    Nutrition Interventions:   Food and/or Nutrient Delivery: Continue Current Diet, Start Oral Nutrition Supplement  Nutrition Education/Counseling: No recommendation at this time  Coordination of Nutrition Care: Continue to monitor while inpatient  Plan of Care discussed with: wife    Goals:     Goals: PO intake 75% or greater, by next RD assessment       Nutrition Monitoring and Evaluation:   Behavioral-Environmental Outcomes: None Identified  Food/Nutrient Intake Outcomes: Diet Advancement/Tolerance, Food and Nutrient Intake  Physical Signs/Symptoms Outcomes: Biochemical Data, Meal Time Behavior, Skin, Weight, Fluid Status or Edema    Discharge Planning:    Continue current diet     Kaiser Conrad RD, LD  Contact: [phone]

## 2022-10-05 NOTE — CARE COORDINATION
Mr. Johanna Li has cirrhosis of the liver due to alcohol abuse, uncontrolled type 2 diabetes and recurring  Fluid build up in his right lung. He says the fluid build up causes him to be short of breath and to feel dizzy. He is under his PCPs care but doesn't think anything can prevent readmission at this point.

## 2022-10-06 ENCOUNTER — APPOINTMENT (OUTPATIENT)
Dept: CT IMAGING | Age: 77
DRG: 432 | End: 2022-10-06
Payer: MEDICARE

## 2022-10-06 ENCOUNTER — APPOINTMENT (OUTPATIENT)
Dept: GENERAL RADIOLOGY | Age: 77
DRG: 432 | End: 2022-10-06
Payer: MEDICARE

## 2022-10-06 ENCOUNTER — TELEPHONE (OUTPATIENT)
Dept: FAMILY MEDICINE CLINIC | Age: 77
End: 2022-10-06

## 2022-10-06 ENCOUNTER — APPOINTMENT (OUTPATIENT)
Dept: INTERVENTIONAL RADIOLOGY/VASCULAR | Age: 77
DRG: 432 | End: 2022-10-06
Payer: MEDICARE

## 2022-10-06 VITALS
HEART RATE: 66 BPM | DIASTOLIC BLOOD PRESSURE: 58 MMHG | SYSTOLIC BLOOD PRESSURE: 112 MMHG | HEIGHT: 68 IN | WEIGHT: 213.85 LBS | BODY MASS INDEX: 32.41 KG/M2 | OXYGEN SATURATION: 92 % | RESPIRATION RATE: 18 BRPM | TEMPERATURE: 98.1 F

## 2022-10-06 LAB
ALBUMIN SERPL-MCNC: 3.2 GM/DL (ref 3.4–5)
ALP BLD-CCNC: 101 IU/L (ref 40–128)
ALT SERPL-CCNC: 28 U/L (ref 10–40)
ANION GAP SERPL CALCULATED.3IONS-SCNC: 8 MMOL/L (ref 4–16)
AST SERPL-CCNC: 40 IU/L (ref 15–37)
BILIRUB SERPL-MCNC: 0.8 MG/DL (ref 0–1)
BUN BLDV-MCNC: 25 MG/DL (ref 6–23)
CALCIUM SERPL-MCNC: 9.9 MG/DL (ref 8.3–10.6)
CHLORIDE BLD-SCNC: 96 MMOL/L (ref 99–110)
CO2: 33 MMOL/L (ref 21–32)
CREAT SERPL-MCNC: 1.3 MG/DL (ref 0.9–1.3)
GFR AFRICAN AMERICAN: >60 ML/MIN/1.73M2
GFR NON-AFRICAN AMERICAN: 54 ML/MIN/1.73M2
GLUCOSE BLD-MCNC: 96 MG/DL (ref 70–99)
HCT VFR BLD CALC: 36.1 % (ref 42–52)
HEMOGLOBIN: 11.9 GM/DL (ref 13.5–18)
MCH RBC QN AUTO: 32.2 PG (ref 27–31)
MCHC RBC AUTO-ENTMCNC: 33 % (ref 32–36)
MCV RBC AUTO: 97.6 FL (ref 78–100)
PDW BLD-RTO: 14.9 % (ref 11.7–14.9)
PLATELET # BLD: 143 K/CU MM (ref 140–440)
PMV BLD AUTO: 10.9 FL (ref 7.5–11.1)
POTASSIUM SERPL-SCNC: 4.3 MMOL/L (ref 3.5–5.1)
RBC # BLD: 3.7 M/CU MM (ref 4.6–6.2)
SODIUM BLD-SCNC: 137 MMOL/L (ref 135–145)
TOTAL PROTEIN: 6.4 GM/DL (ref 6.4–8.2)
WBC # BLD: 7.6 K/CU MM (ref 4–10.5)

## 2022-10-06 PROCEDURE — 6370000000 HC RX 637 (ALT 250 FOR IP): Performed by: PHYSICIAN ASSISTANT

## 2022-10-06 PROCEDURE — 71250 CT THORAX DX C-: CPT

## 2022-10-06 PROCEDURE — 85027 COMPLETE CBC AUTOMATED: CPT

## 2022-10-06 PROCEDURE — 36415 COLL VENOUS BLD VENIPUNCTURE: CPT

## 2022-10-06 PROCEDURE — 32555 ASPIRATE PLEURA W/ IMAGING: CPT

## 2022-10-06 PROCEDURE — 99232 SBSQ HOSP IP/OBS MODERATE 35: CPT | Performed by: INTERNAL MEDICINE

## 2022-10-06 PROCEDURE — 32555 ASPIRATE PLEURA W/ IMAGING: CPT | Performed by: INTERNAL MEDICINE

## 2022-10-06 PROCEDURE — 0W993ZZ DRAINAGE OF RIGHT PLEURAL CAVITY, PERCUTANEOUS APPROACH: ICD-10-PCS | Performed by: INTERNAL MEDICINE

## 2022-10-06 PROCEDURE — 71045 X-RAY EXAM CHEST 1 VIEW: CPT

## 2022-10-06 PROCEDURE — 94761 N-INVAS EAR/PLS OXIMETRY MLT: CPT

## 2022-10-06 PROCEDURE — 99024 POSTOP FOLLOW-UP VISIT: CPT | Performed by: INTERNAL MEDICINE

## 2022-10-06 PROCEDURE — 2580000003 HC RX 258: Performed by: PHYSICIAN ASSISTANT

## 2022-10-06 PROCEDURE — 80053 COMPREHEN METABOLIC PANEL: CPT

## 2022-10-06 RX ADMIN — RIFAXIMIN 550 MG: 550 TABLET ORAL at 09:15

## 2022-10-06 RX ADMIN — PANTOPRAZOLE SODIUM 40 MG: 40 TABLET, DELAYED RELEASE ORAL at 06:46

## 2022-10-06 RX ADMIN — FUROSEMIDE 40 MG: 40 TABLET ORAL at 09:22

## 2022-10-06 RX ADMIN — SPIRONOLACTONE 100 MG: 50 TABLET ORAL at 09:16

## 2022-10-06 RX ADMIN — LACTULOSE 20 G: 10 SOLUTION ORAL at 15:20

## 2022-10-06 RX ADMIN — DULOXETINE 60 MG: 30 CAPSULE, DELAYED RELEASE ORAL at 09:16

## 2022-10-06 RX ADMIN — SODIUM CHLORIDE, PRESERVATIVE FREE 10 ML: 5 INJECTION INTRAVENOUS at 09:17

## 2022-10-06 RX ADMIN — LEVOTHYROXINE SODIUM 50 MCG: 0.05 TABLET ORAL at 06:46

## 2022-10-06 RX ADMIN — ACETAMINOPHEN 650 MG: 325 TABLET ORAL at 03:17

## 2022-10-06 RX ADMIN — LACTULOSE 20 G: 10 SOLUTION ORAL at 09:15

## 2022-10-06 ASSESSMENT — PAIN SCALES - GENERAL: PAINLEVEL_OUTOF10: 3

## 2022-10-06 ASSESSMENT — PAIN DESCRIPTION - DESCRIPTORS: DESCRIPTORS: ACHING

## 2022-10-06 ASSESSMENT — PAIN DESCRIPTION - LOCATION: LOCATION: GENERALIZED

## 2022-10-06 NOTE — PROGRESS NOTES
V2.0  [facility] – [place], [place] Hospitalist Progress Note      Name:  Ulices Ruiz /Age/Sex: 1945  (78 y.o. male)   MRN & CSN:  [id number] & [id number] Encounter Date/Time: 10/6/2022 9:40 AM EDT    Location:  -A PCP: Kerrie Jacobs, [address] Day: 3    Assessment and Plan:   Ulices Ruiz is a 78 y.o. male with a past medical history of alcoholic cirrhosis, hyperlipidemia, hypertension who presents with Pleural effusion. Recurrent right-sided pleural effusion  Acute hypoxic respiratory failure - resolved  -Multiple prior thoracenteses  - s/p thoracentesis per Dr. Stanley May yesterday, repeat CXR largely unchanged   - CT Chest w/p contrast 10/5/ with large right pleural effusion with only a small portion of the right upper lobe aerated  -Weaned to room air today, continue as needed nasal cannula  -Dr. Ander Lopez for repeat thoracentesis today     Recurrent ascites  -Multiple prior paracenteses, multiple readmissions   -IR consulted from ED, status post paracentesis 10/3/22 with 6L off  - s/p albumin   - continue home diuretics  - discussed with Dr. Daniel Wilburn, family to call office on discharge to have recurrent paracentesis as outpatient     Alcoholic liver cirrhosis  Portal hypertension  History of esophageal varices  History of hepatic encephalopathy  -Continue nadolol, spironolactone and Lasix  -Continue rifaximin, lactulose  -follows with Dr. Daniel Wilburn, followed on recent admission and deemed not a candidate for TIPS or transplant     CKDIII  -baseline Cr ~1.2-1.3, remains near baseline  - monitor creatinine      Hypertension   -BP stable, occasionally soft   - continue home nadalol, Aldactone, Lasix with hold parameters     Hypothyroidism   - continue home synthroid      History of alcohol abuse   - no longer drinking, patient reports 204 days since last drink    This patient was discussed with Dr. Valentin Whitaker. He was agreeable with the plan and management as dictated above.      Current Living situation: home with wife  Expected Disposition: same  Estimated D/C: 1-2 days    Diet ADULT DIET; Regular; Low Fat/Low Chol/High Fiber/2 gm Na; 2000 ml   DVT Prophylaxis [] Lovenox, []  Heparin, [] SCDs, [x] Ambulation,  [] Eliquis, [] Xarelto []Coumadin   Code Status Full Code   Disposition Patient requires continued admission due to awaiting thoracentesis   Surrogate Decision Maker/ POA      Subjective:     Chief Complaint: Shortness of Breath     Patient seen and examined at bedside. States breathing much improved today. Off oxygen and ambulating in halls without difficulty. Discussed plan of care with patient and family at bedside. Review of Systems:    Ten point ROS reviewed negative, unless as noted above    Objective: Intake/Output Summary (Last 24 hours) at 10/6/2022 1109  Last data filed at 10/6/2022 0917  Gross per 24 hour   Intake 365 ml   Output --   Net 365 ml          Vitals:   Vitals:    10/06/22 0912   BP: (!) 112/58   Pulse: 69   Resp: 18   Temp: 98.1 °F (36.7 °C)   SpO2: 90%       Physical Exam:   PHYSICAL EXAM   GEN Awake male, sitting upright in bed in no apparent distress. Appears given age. EYES Pupils are equally round. Gaze conjugate. HENT Mucous membranes are moist. Yocha Dehe. NECK Supple, no apparent thyromegaly or masses. RESP Respirations even and unlabored on RA, decreased breath sounds R lung, otherwise CTA  CARDIO/VASC Regular rate without appreciable murmurs. 1+ BLE pitting edema. GI Abdomen is soft without significant tenderness, masses, or guarding.   Schilling catheter is not present. MSK No gross joint deformities. SKIN Normal coloration, warm, dry. NEURO Cranial nerves appear grossly intact, normal speech, no lateralizing weakness. PSYCH Awake, alert, oriented x 4. Affect appropriate.     Medications:   Medications:    sodium chloride flush  5-40 mL IntraVENous 2 times per day    DULoxetine  60 mg Oral Daily    furosemide  40 mg Oral BID    lactulose  20 g Oral TID    levothyroxine  50 mcg Oral Daily    nadolol  40 mg Oral Nightly    rifAXIMin  550 mg Oral BID    spironolactone  100 mg Oral BID    pantoprazole  40 mg Oral QAM AC      Infusions:    sodium chloride       PRN Meds: sodium chloride flush, 5-40 mL, PRN  sodium chloride, , PRN  ondansetron, 4 mg, Q8H PRN   Or  ondansetron, 4 mg, Q6H PRN  polyethylene glycol, 17 g, Daily PRN  acetaminophen, 650 mg, Q6H PRN   Or  acetaminophen, 650 mg, Q6H PRN  albuterol sulfate HFA, 2 puff, Q6H PRN      Labs      Recent Results (from the past 24 hour(s))   Culture, Body Fluid    Collection Time: 10/05/22 12:55 PM    Specimen: Pleural Fluid   Result Value Ref Range    Specimen PLEURAL FLUID RT     Special Requests NONE     Gram Smear RARE  NECROTIC POLYS       Gram Smear NO ORGANISMS SEEN    Comprehensive Metabolic Panel    Collection Time: 10/06/22  4:23 AM   Result Value Ref Range    Sodium 137 135 - 145 MMOL/L    Potassium 4.3 3.5 - 5.1 MMOL/L    Chloride 96 (L) 99 - 110 mMol/L    CO2 33 (H) 21 - 32 MMOL/L    BUN 25 (H) 6 - 23 MG/DL    Creatinine 1.3 0.9 - 1.3 MG/DL    Glucose 96 70 - 99 MG/DL    Calcium 9.9 8.3 - 10.6 MG/DL    Albumin 3.2 (L) 3.4 - 5.0 GM/DL    Total Protein 6.4 6.4 - 8.2 GM/DL    Total Bilirubin 0.8 0.0 - 1.0 MG/DL    ALT 28 10 - 40 U/L    AST 40 (H) 15 - 37 IU/L    Alkaline Phosphatase 101 40 - 128 IU/L    GFR Non- 54 (L) >60 mL/min/1.73m2    GFR African American >60 >60 mL/min/1.73m2    Anion Gap 8 4 - 16   CBC    Collection Time: 10/06/22  4:23 AM   Result Value Ref Range    WBC 7.6 4.0 - 10.5 K/CU MM    RBC 3.70 (L) 4.6 - 6.2 M/CU MM    Hemoglobin 11.9 (L) 13.5 - 18.0 GM/DL    Hematocrit 36.1 (L) 42 - 52 %    MCV 97.6 78 - 100 FL    MCH 32.2 (H) 27 - 31 PG    MCHC 33.0 32.0 - 36.0 %    RDW 14.9 11.7 - 14.9 %    Platelets 385 007 - 617 K/CU MM    MPV 10.9 7.5 - 11.1 FL        Imaging/Diagnostics Last 24 Hours   XR CHEST PORTABLE    Result Date: 10/4/2022  EXAMINATION: ONE XRAY VIEW OF THE CHEST 10/4/2022 10:50 am COMPARISON: 09/14/2022 HISTORY: ORDERING SYSTEM PROVIDED HISTORY: Chest Pain TECHNOLOGIST PROVIDED HISTORY: Reason for exam:->Chest Pain Reason for Exam: Chest Pain FINDINGS: There is complete opacification of the right hemithorax consistent with a large right pleural effusion. The left lung field is clear.      Near complete opacification the right hemithorax consistent with large pleural effusion       Electronically signed by Susan Kraft PA-C on 10/6/2022 at 11:09 AM

## 2022-10-06 NOTE — DISCHARGE SUMMARY
V2.0  Discharge Summary    Name:  Cindy Leiva /Age/Sex: 1945 (80 y.o. male)   Admit Date: 10/4/2022  Discharge Date: 10/6/22    MRN & CSN:  [id number] & [id number] Encounter Date and Time 10/6/22 3:24 PM EDT    Attending:  Lona Donnelly MD Discharging Provider: Ced Cristina [facility] Course:     Brief HPI: Cindy Leiva is a 78 y.o. male with a past medical history of alcoholic cirrhosis, hyperlipidemia, hypertension who presents with Pleural effusion.      Problems addressed during this hospitalization:     Recurrent right-sided pleural effusion  Acute hypoxic respiratory failure - resolved  -Multiple prior thoracenteses  - CT Chest w/p contrast 10/5/ with large right pleural effusion with only a small portion of the right upper lobe aerated  - s/p thoracentesis per Dr. Joseph Santoyo 10/5, repeat CXR largely unchanged; repeat thoracentesis 10/6, CXR with improvement in pleural effusion, no pneumothorax  -Weaned to room air today, continue as needed nasal cannula at home  -cleared for discharge by Dr. Joseph Santoyo with outpatient follow-up     Recurrent ascites  -Multiple prior paracenteses, multiple readmissions   -IR consulted from ED, status post paracentesis 10/3/22 with 6L off  - s/p albumin   - continue home diuretics  - discussed with Dr. Anjali Guerrero, family to call office on discharge to have recurrent paracentesis as outpatient     Alcoholic liver cirrhosis  Portal hypertension  History of esophageal varices  History of hepatic encephalopathy  -Continue nadolol, spironolactone and Lasix  -Continue rifaximin, lactulose  -follows with Dr. Anjali Guerrero, followed on recent admission and deemed not a candidate for TIPS or transplant     CKDIII  -baseline Cr ~1.2-1.3, remains near baseline  - monitor creatinine      Hypertension   -BP stable, occasionally soft   - continue home nadalol, Aldactone, Lasix with hold parameters     Hypothyroidism   - continue home synthroid      History of alcohol abuse - no longer drinking, patient reports 204 days since last drink    This patient was discussed with Dr. Nadeen rBunner. He was agreeable with patient's plan at discharge as dictated above. The patient expressed appropriate understanding of, and agreement with the discharge recommendations, medications, and plan. Consults this admission:  IP CONSULT TO INTERVENTIONAL RADIOLOGY  IP CONSULT TO HOSPITALIST  IP CONSULT TO PULMONOLOGY    Discharge Diagnosis:   Pleural effusion    Discharge Instruction:   Follow up appointments: GI, pulmnology  Primary care physician: Sonya Bob MD within 2 weeks  Diet: regular diet   Activity: activity as tolerated  Disposition: Discharged to:   [x]Home, [][facility], []SNF, []Acute Rehab, []Hospice   Condition on discharge: Stable  Labs and Tests to be Followed up as an outpatient by PCP or Specialist:     Discharge Medications:        Medication List        CHANGE how you take these medications      DULoxetine 60 MG extended release capsule  Commonly known as: CYMBALTA  TAKE 1 CAPSULE BY MOUTH ONCE DAILY  What changed:   how much to take  how to take this  when to take this     nadolol 40 MG tablet  Commonly known as: CORGARD  Take 1 tablet by mouth in the morning. What changed: when to take this            CONTINUE taking these medications      albuterol sulfate  (90 Base) MCG/ACT inhaler  Commonly known as: Ventolin HFA  Inhale 2 puffs into the lungs 4 times daily as needed for Wheezing     ESOMEPRAZOLE MAGNESIUM PO     furosemide 40 MG tablet  Commonly known as: LASIX  Take 1 tablet by mouth in the morning and 1 tablet in the evening.      lactulose 10 GM/15ML solution  Commonly known as: CHRONULAC  Take 30 mLs by mouth 3 times daily Hold if having diarrhea     levothyroxine 50 MCG tablet  Commonly known as: SYNTHROID  Take 1 tablet by mouth Daily     MULTI VITAMIN MENS PO     Omega-3 1000 MG Caps     rifAXIMin 550 MG tablet  Commonly known as: XIFAXAN  Take 1 tablet by mouth 2 times daily     spironolactone 100 MG tablet  Commonly known as: ALDACTONE             Objective Findings at Discharge:   BP (!) 112/58   Pulse 66   Temp 98.1 °F (36.7 °C) (Oral)   Resp 18   Ht 5' 8\" (1.727 m)   Wt 213 lb 13.5 oz (97 kg)   SpO2 92%   BMI 32.52 kg/m²       Physical Exam:   General: NAD  Eyes: EOMI  ENT: neck supple  Cardiovascular: Regular rate and rhythm   Respiratory: Clear to auscultation bilaterally, respirations even and unlabored on RA  Gastrointestinal: Abdomen soft, non tender  Genitourinary: no suprapubic tenderness  Musculoskeletal: No edema  Skin: warm, dry  Neuro: Alert and oriented x4. Psych: Mood appropriate. Labs and Imaging   CT CHEST WO CONTRAST    Result Date: 10/6/2022  EXAMINATION: CT OF THE CHEST WITHOUT CONTRAST 10/6/2022 7:50 am TECHNIQUE: CT of the chest was performed without the administration of intravenous contrast. Multiplanar reformatted images are provided for review. Automated exposure control, iterative reconstruction, and/or weight based adjustment of the mA/kV was utilized to reduce the radiation dose to as low as reasonably achievable. COMPARISON: 10/05/2022, 10/04/2022 HISTORY: ORDERING SYSTEM PROVIDED HISTORY: right pleural effusion TECHNOLOGIST PROVIDED HISTORY: Reason for exam:->right pleural effusion Reason for Exam: right pleural effusion FINDINGS: Mediastinum: The thyroid is within normal limits. Aortic and coronary atherosclerosis. No pericardial effusion. The esophagus is within normal limits. Scattered mildly enlarged mediastinal lymph nodes. Lungs/pleura: Large right-sided pleural effusion. Only a small portion of the right upper lobe remains aerated. There is mild leftward mediastinal shift. No effusion on the left. Extensive compressive atelectasis of the right lung with possible small amount of airspace consolidation. No pneumothorax. The central airways are grossly patent.  Upper Abdomen: Cirrhotic appearance of the 4.3 4.3   CL 94* 98* 96*   CO2 32 35* 33*   BUN 27* 25* 25*   CREATININE 1.4* 1.3 1.3   GLUCOSE 157* 95 96     Hepatic:   Recent Labs     10/04/22  1111 10/05/22  0410 10/06/22  0423   AST 42* 40* 40*   ALT 31 28 28   BILITOT 1.1* 1.1* 0.8   ALKPHOS 111 102 101     Lipids:   Lab Results   Component Value Date/Time    CHOL 174 05/07/2022 03:25 AM    CHOL 230 10/14/2019 01:45 PM    HDL 28 05/07/2022 03:25 AM    TRIG 130 05/07/2022 03:25 AM     Hemoglobin A1C:   Lab Results   Component Value Date/Time    LABA1C 5.5 06/22/2022 10:23 AM     TSH:   Lab Results   Component Value Date/Time    TSH 1.39 10/14/2019 01:45 PM     Troponin:   Lab Results   Component Value Date/Time    TROPONINT <0.010 10/04/2022 11:11 AM    TROPONINT 0.017 09/09/2022 04:25 PM    TROPONINT 0.027 06/21/2022 08:27 PM     Lactic Acid: No results for input(s): LACTA in the last 72 hours.   BNP:   Recent Labs     10/04/22  1111   PROBNP 288.3     UA:  Lab Results   Component Value Date/Time    NITRU NEGATIVE 06/22/2022 12:10 PM    COLORU YELLOW 06/22/2022 12:10 PM    [address] NONE SEEN 06/22/2022 12:10 PM    RBCUA 1 06/22/2022 12:10 PM    MUCUS RARE 06/22/2022 12:10 PM    TRICHOMONAS NONE SEEN 06/22/2022 12:10 PM    BACTERIA NEGATIVE 06/22/2022 12:10 PM    CLARITYU CLEAR 06/22/2022 12:10 PM    SPECGRAV 1.020 06/22/2022 12:10 PM    LEUKOCYTESUR NEGATIVE 06/22/2022 12:10 PM    UROBILINOGEN 1.0 06/22/2022 12:10 PM    [address] NEGATIVE 06/22/2022 12:10 PM    BLOODU TRACE 06/22/2022 12:10 PM    [address] NEGATIVE 06/22/2022 12:10 PM     Urine Cultures: No results found for: LABURIN  Blood Cultures: No results found for: BC  No results found for: BLOODCULT2  Organism: No results found for: ORG    Time Spent Discharging patient 35 minutes    Electronically signed by Ced Cristina PA-C on 10/6/2022 at 3:57 PM

## 2022-10-06 NOTE — TELEPHONE ENCOUNTER
To Dr. Milena Alexander-    Patient would like 2 handicap placards please. Could you please call Petar Marrufo when the prescription is ready to .     Phone: [phone]

## 2022-10-06 NOTE — PROGRESS NOTES
Pulmonary and Critical Care  Progress Note      VITALS:  BP (!) 112/58   Pulse 69   Temp 98.1 °F (36.7 °C) (Oral)   Resp 18   Ht 5' 8\" (1.727 m)   Wt 213 lb 13.5 oz (97 kg)   SpO2 90%   BMI 32.52 kg/m²     Subjective:   CHIEF COMPLAINT :SOB     HPI:                The patient is a 78 y.o. male is lying in the bed. He is not in acute resp distress    Objective:   PHYSICAL EXAM:    LUNGS:Decreased air entry right side  Abd-soft, BS+,NT  Ext- no pedal edema  CVS-s1s2, no murmurs      DATA:    CBC:  Recent Labs     10/04/22  1111 10/05/22  0410 10/06/22  0423   WBC 7.6 6.4 7.6   RBC 4.11* 3.83* 3.70*   HGB 13.4* 12.6* 11.9*   HCT 40.8* 38.8* 36.1*    160 143   MCV 99.3 101.3* 97.6   MCH 32.6* 32.9* 32.2*   MCHC 32.8 32.5 33.0   RDW 15.3* 15.3* 14.9   SEGSPCT  --  72.2*  --       BMP:  Recent Labs     10/04/22  1111 10/05/22  0410 10/06/22  0423   * 139 137   K 4.5 4.3 4.3   CL 94* 98* 96*   CO2 32 35* 33*   BUN 27* 25* 25*   CREATININE 1.4* 1.3 1.3   CALCIUM 10.3 10.1 9.9   GLUCOSE 157* 95 96      ABG:  No results for input(s): PH, PO2ART, LSR1FHE, HCO3, BEART, O2SAT in the last 72 hours. BNP  No results found for: BNP   D-Dimer:  Lab Results   Component Value Date    DDIMER 1606 (H) 09/13/2021      Radiology:   New minimal aeration of the right lung with near complete opacification of   the right hemithorax.          Assessment/Plan     Patient Active Problem List    Diagnosis Date Noted    Pleural effusion 10/04/2022     Priority: Medium    Recurrent pleural effusion on right 09/09/2022     Priority: Medium    Hypersomnia 08/15/2022     Priority: Medium    Ex-smoker 08/15/2022     Priority: Medium    Obesity (BMI 30-39.9) 08/15/2022     Priority: Medium    Type 2 diabetes mellitus with chronic kidney disease 07/12/2022     Priority: Medium    Thyroid disease 06/21/2022     Priority: Medium    Cellulitis of left lower extremity 06/08/2022     Priority: Medium    Type 2 diabetes mellitus without complication, without long-term current use of insulin ([address] 06/08/2022     Priority: Medium    Generalized weakness      Priority: Medium    Oropharyngeal dysphagia      Priority: Medium    Acute kidney injury superimposed on chronic kidney disease ([address]      Priority: Medium    Acute respiratory failure ([address] 05/04/2022     Priority: Medium    Recurrent right pleural effusion 05/02/2022     Priority: Medium    Sepsis ([address] 03/19/2022    Hepatic encephalopathy 03/19/2022    Hearing loss 03/11/2022    Cirrhosis of liver with ascites ([address]     Secondary esophageal varices without bleeding (HCC)     Gastric polyps     SOB (shortness of breath) 09/13/2021    Portal hypertension ([address] 08/30/2021    JENNIFER (obstructive sleep apnea) 08/09/2021    Bilateral hearing loss 05/07/2021    Increased ammonia level 05/07/2021    Class 3 severe obesity with body mass index (BMI) of 40.0 to 44.9 in adult ([facility] 09/21/2020    Hyperglycemia 12/04/2019    Acquired hypothyroidism 12/04/2019    Pulmonary nodules 09/09/2019    Ascites due to alcoholic cirrhosis ([address] 14/24/1202    Mixed hyperlipidemia 08/08/2019    Hypertension 08/08/2019    History of alcohol abuse 08/08/2019    Gastroesophageal reflux disease 08/08/2019    Anxiety 08/08/2019    Shortness of breath 05/26/2019    History of colonic polyps 01/15/2019   CANDIE on CKD  Alcoholic Cirrhosis with Portal HTN  Recurrent ascites  Recurrent Right Pleural effusion 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

## 2022-10-06 NOTE — CARE COORDINATION
Patient off the floor for procedure at this time. Spoke with his wife Jos Bardales in room. Patient is from home with his wife and is independent. Jos Bardales stated patient does not have to use any DME. Patient has PCP and insurance to assist with RX coverage. CM inquired on if patient would need any HHC do they have a preference on the agency. Jos Bardales stated [place] [facility] [place] and that would be their first choice . Jos Bardales added that they only want [address] if it is necessary. No other discharge needs identified.

## 2022-10-06 NOTE — PROGRESS NOTES
Returned from procedure. No problems or concerns noted. Xray was obtained. Patient wanting to know if he will get to go home today. Sent message to physician.

## 2022-10-06 NOTE — TELEPHONE ENCOUNTER
To handicap placard for him would be fine. He has been quite ill with liver disease and heart failure.

## 2022-10-06 NOTE — PLAN OF CARE
Problem: Discharge Planning  Goal: Discharge to home or other facility with appropriate resources  10/6/2022 1647 by Chayo Mac  Outcome: Adequate for Discharge  10/6/2022 0259 by Elena Richey RN  Outcome: Progressing     Problem: Pain  Goal: Verbalizes/displays adequate comfort level or baseline comfort level  10/6/2022 1647 by Chayo Mac  Outcome: Adequate for Discharge  10/6/2022 0259 by Elena Richey RN  Outcome: Progressing     Problem: Nutrition Deficit:  Goal: Optimize nutritional status  10/6/2022 1647 by Chayo Mac  Outcome: Adequate for Discharge  10/6/2022 0259 by Elena Richey RN  Outcome: Progressing

## 2022-10-06 NOTE — PROCEDURES
Yu Espino is a 78 y.o. male patient. 1. Recurrent right pleural effusion    2. Ascites due to alcoholic cirrhosis ([facility]    3. Hypoxia      Past Medical History:   Diagnosis Date    Anxiety     Cirrhosis, alcoholic ([facility]     Diabetes mellitus ([facility]     GERD (gastroesophageal reflux disease) 2000    GERD (gastroesophageal reflux disease)     Hyperlipidemia     Hypertension     Portal hypertension (HCC)     Pulmonary nodules     Sleep apnea     SOB (shortness of breath)     Thyroid disease      Blood pressure (!) 112/58, pulse 66, temperature 98.1 °F (36.7 °C), temperature source Oral, resp. rate 18, height 5' 8\" (1.727 m), weight 213 lb 13.5 oz (97 kg), SpO2 92 %. INDICATION: Recurrent right Pleural effusion  Right thoracentesis   ESTIMATED BLOOD LOSS: None    Thoracentesis    Date/Time: 10/6/2022 2:10 PM  Performed by: Rolando Aldana MD  Authorized by: Rolando Aldana MD   Consent: Verbal consent obtained. Written consent obtained. Risks and benefits: risks, benefits and alternatives were discussed  Consent given by: patient  Patient understanding: patient states understanding of the procedure being performed  Patient consent: the patient's understanding of the procedure matches consent given  Procedure consent: procedure consent matches procedure scheduled  Relevant documents: relevant documents present and verified  Test results: test results available and properly labeled  Site marked: the operative site was marked  Imaging studies: imaging studies available  Patient identity confirmed: verbally with patient and arm band  Time out: Immediately prior to procedure a \"time out\" was called to verify the correct patient, procedure, equipment, support staff and site/side marked as required.   Body area: trunk    1% Lidocaine 10 ml used  2100 ml of creamy white fluid drianed  CXR stat    Rolando Aldana MD  10/6/2022

## 2022-10-06 NOTE — LETTER
[address] [place] [address] Tavia Zaidi  Phone: [phone]  Fax: [phone]    Terrie Mendoza MD         October 6, 2022     Patient: Chapincito Alicia   YOB: 1945   Date of Visit: 10/6/2022       To Whom It May Concern: It is my medical opinion that Barbara Box requires a disability parking placard for the following reasons:  He cannot walk 200 feet without stopping to rest.  Duration of need: 5 years    If you have any questions or concerns, please don't hesitate to call.     Sincerely,        Terrie Mendoza MD

## 2022-10-07 RX ORDER — SODIUM CHLORIDE 0.9 % (FLUSH) 0.9 %
10 SYRINGE (ML) INJECTION PRN
Status: CANCELLED | OUTPATIENT
Start: 2022-10-07

## 2022-10-07 NOTE — PROGRESS NOTES
IR Procedure at [facility]:  Spoke with patient's wife Diana Yu and patient will arrive at [address] [place] [address] Rd[address] at [facility] on 10/10/2022 for his procedure at 0830. Also went over below instructions. (Paracentesis, Thoracentesis, Thyroid Bx and Injections may have a light breakfast)  2. Follow your directions as prescribed by the doctor for your procedure and medications. 3.   Consult your provider as to when to stop blood thinner  4. Do not take any pain medication within 6 hours of your procedure  5. Do not drink any alcoholic beverages or use any street drugs 24 hours before procedure. 6.   Please wear simple, loose fitting clothing to the hospital.  Do not bring valuables (money,             credit cards, checkbooks, etc.)     7. If you  have a Living Will and Durable Power of  for Healthcare, please bring in a copy. 8.   Please bring picture ID,  insurance card, paperwork from the doctors office            (H & P, Consent,  & card for implantable devices). 9.   Report to the information desk on the ground floor. 10. Take a shower the night before or morning of your procedure, do not apply any lotion, oil or powder. 11. If you are going to be sedated for the procedure, you will need a responsible adult to drive you home.

## 2022-10-08 LAB
CULTURE: NORMAL
GRAM SMEAR: NORMAL
GRAM SMEAR: NORMAL
Lab: NORMAL
SPECIMEN: NORMAL

## 2022-10-10 ENCOUNTER — TELEPHONE (OUTPATIENT)
Dept: FAMILY MEDICINE CLINIC | Age: 77
End: 2022-10-10

## 2022-10-10 ENCOUNTER — HOSPITAL ENCOUNTER (OUTPATIENT)
Dept: INTERVENTIONAL RADIOLOGY/VASCULAR | Age: 77
Discharge: HOME | End: 2022-10-10
Payer: COMMERCIAL

## 2022-10-10 VITALS
SYSTOLIC BLOOD PRESSURE: 116 MMHG | RESPIRATION RATE: 18 BRPM | DIASTOLIC BLOOD PRESSURE: 66 MMHG | TEMPERATURE: 98.6 F | OXYGEN SATURATION: 93 % | HEART RATE: 75 BPM

## 2022-10-10 DIAGNOSIS — R18.8 OTHER ASCITES: ICD-10-CM

## 2022-10-10 PROCEDURE — 6360000002 HC RX W HCPCS

## 2022-10-10 PROCEDURE — 2580000003 HC RX 258

## 2022-10-10 PROCEDURE — 89051 BODY FLUID CELL COUNT: CPT

## 2022-10-10 PROCEDURE — P9047 ALBUMIN (HUMAN), 25%, 50ML: HCPCS

## 2022-10-10 PROCEDURE — 7100000010 HC PHASE II RECOVERY - FIRST 15 MIN

## 2022-10-10 PROCEDURE — 87070 CULTURE OTHR SPECIMN AEROBIC: CPT

## 2022-10-10 PROCEDURE — 49083 ABD PARACENTESIS W/IMAGING: CPT

## 2022-10-10 RX ORDER — ALBUMIN (HUMAN) 12.5 G/50ML
12.5 SOLUTION INTRAVENOUS ONCE
Status: COMPLETED | OUTPATIENT
Start: 2022-10-10 | End: 2022-10-10

## 2022-10-10 RX ADMIN — ALBUMIN (HUMAN) 12.5 G: 12.5 SOLUTION INTRAVENOUS at 08:10

## 2022-10-10 ASSESSMENT — PAIN SCALES - GENERAL: PAINLEVEL_OUTOF10: 0

## 2022-10-10 ASSESSMENT — PAIN - FUNCTIONAL ASSESSMENT: PAIN_FUNCTIONAL_ASSESSMENT: 0-10

## 2022-10-10 NOTE — PROGRESS NOTES
1827- Report called to this nurse from [facility]- Discharge instructions given to [facility] Patients wife. 3710- Vitals WNL. IV removed. 6603- Patient escorted to elevator. Spouse is transporting home.

## 2022-10-10 NOTE — TELEPHONE ENCOUNTER
[facility] Transitions Initial Follow Up Call    Outreach made within 2 business days of discharge: Yes    Patient: Camilla Tian Patient : 1945   MRN: [id number]  Reason for Admission: There are no discharge diagnoses documented for the most recent discharge. Discharge Date: 10/6/22       Spoke with: Wife    Discharge department/facility: [facility]    TCM Interactive Patient Contact:  Was patient able to fill all prescriptions: Yes  Was patient instructed to bring all medications to the follow-up visit: Yes  Is patient taking all medications as directed in the discharge summary?  Yes  Does patient understand their discharge instructions: Yes  Does patient have questions or concerns that need addressed prior to 7-14 day follow up office visit: no    Scheduled appointment with PCP within 7-14 days    Follow Up  Future Appointments   Date Time Provider Lucita Murphy   10/14/2022  2:45 PM Nikki Patel MD [address] FPS [facility]   10/17/2022 11:45 AM Ezequiel Blancas MD [address] PULM [facility]   10/24/2022  2:10 PM Maylin Robertson MD [address] Gastro [facility]   11/15/2022  3:30 PM Nikki Patel MD [address] [facility]       Amber Ardon MA

## 2022-10-10 NOTE — DISCHARGE INSTRUCTIONS
[facility]    Patient Discharge Instructions Abdominal Fluid Drainage      You have had had an Abdominal Fluid Drainage in the Radiology Department. Below are guidelines for you to follow after your test is completed:    Go home and rest quietly for the remainder of the day. DO NOT drive, operate appliances, or make any legal decisions today. You may resume normal activities tomorrow. Have a responsible adult drive you home and remain with you for the remainder of the day. You may resume your normal diet after the procedure. Avoid alcoholic beverages and depressant drugs for 24 hours. You may resume your previous medications unless directed not to by your physician or the Radiologist. Isma Dupree may use Tylenol (one or two tablets every four to six hours) for discomfort at the puncture site. If you develop severe pain, swelling, or redness at the site, call the Radiology Department. Call your physician immediately if you develop a rapid pulse (greater than 100 beats per minute), feel faint, sweat profusely, experience a sudden onset of weakness, or experience increased pain or swelling or saturation of dressing at the puncture site. Call your physician immediately if you develop a sudden onset of rapid breathing (greater than 20 breaths per minute), upper back or chest pain, shortness of breath, sweating, skin color change, or a sudden onset of anxiety. Report a temperature greater than 101 degrees Fahrenheit (38.8 degrees Celsius) to us or to your physician. Check the dressing throughout the day for an increase in drainage. Keep the dressing dry for 24 hours. Replace with a Band-Aid if necessary. You may shower 24 hours after the procedure. Do not smoke for 24 hours after the procedure. Call your physician for a follow-up appointment.   If any questions or complications develop after you go home, please call the Radiology Department at [phone]

## 2022-10-10 NOTE — OR NURSING
PROCEDURE PERFORMED: paracentesis    PRIMARY INDICATION FOR PROCEDURE: ascites    INFORMED CONSENT:  Obtained prior to procedure. Consent placed in chart. JOVITA SCORE PRE PROCEDURE:  10      PT TRANSPORTED FROM:   \[facility]\""                               TO THE IR ROOM:   Small                     ASSESSMENT: Pt alert & oriented, very [facility]. Able to move all extremities freely    [address]:               Pt positioned supine for comfort. Warm blankets given. Pt placed on Cardiac Monitor. Pt prepped and draped in a sterile fashion with chlorhexadine.     PAIN/LOCAL ANESTHESIA/SEDATION MANAGEMENT:           Local: Lidocaine 1% given by Dr. Chelsea Mejia:           ACCESS TIME: 0809          US/FLUORO: US 5 images          CATHETER USED: 6Fr Safe-T-Centesis            12.5g of 25% albumin adminstered IV     STERILE DRESSINGS: guaze & tegaderm    SPECIMENS: 970cc cloudy pink ascitic fluid removed; 500cc sent to lab    EBL:  <1cc       FOLLOW- UP X-RAY: None    COMPLICATIONS/ OUTCOME: Pt tolerated well    JOVITA SCORE POST PROCEDURE:  10        REPORT CALLED TO: Holden Larson \[facility]\""

## 2022-10-14 ENCOUNTER — OFFICE VISIT (OUTPATIENT)
Dept: FAMILY MEDICINE CLINIC | Age: 77
End: 2022-10-14
Payer: COMMERCIAL

## 2022-10-14 VITALS
OXYGEN SATURATION: 96 % | HEART RATE: 62 BPM | SYSTOLIC BLOOD PRESSURE: 92 MMHG | BODY MASS INDEX: 34.1 KG/M2 | HEIGHT: 68 IN | DIASTOLIC BLOOD PRESSURE: 64 MMHG | WEIGHT: 225 LBS

## 2022-10-14 DIAGNOSIS — R11.0 NAUSEA: ICD-10-CM

## 2022-10-14 DIAGNOSIS — K70.31 ASCITES DUE TO ALCOHOLIC CIRRHOSIS (HCC): ICD-10-CM

## 2022-10-14 DIAGNOSIS — J90 RECURRENT RIGHT PLEURAL EFFUSION: ICD-10-CM

## 2022-10-14 DIAGNOSIS — E03.9 ACQUIRED HYPOTHYROIDISM: ICD-10-CM

## 2022-10-14 DIAGNOSIS — Z92.89 HISTORY OF RECENT HOSPITALIZATION: ICD-10-CM

## 2022-10-14 DIAGNOSIS — H91.93 BILATERAL HEARING LOSS, UNSPECIFIED HEARING LOSS TYPE: Primary | ICD-10-CM

## 2022-10-14 PROCEDURE — 99214 OFFICE O/P EST MOD 30 MIN: CPT | Performed by: FAMILY MEDICINE

## 2022-10-14 PROCEDURE — 1124F ACP DISCUSS-NO DSCNMKR DOCD: CPT | Performed by: FAMILY MEDICINE

## 2022-10-14 RX ORDER — ONDANSETRON 4 MG/1
4 TABLET, ORALLY DISINTEGRATING ORAL 3 TIMES DAILY PRN
Qty: 21 TABLET | Refills: 3 | Status: SHIPPED | OUTPATIENT
Start: 2022-10-14

## 2022-10-14 RX ORDER — ALBUTEROL SULFATE 90 UG/1
AEROSOL, METERED RESPIRATORY (INHALATION)
Qty: 1 EACH | Refills: 2 | Status: SHIPPED | OUTPATIENT
Start: 2022-10-14

## 2022-10-14 RX ORDER — BETAMETHASONE VALERATE 0.1 %
LOTION (ML) TOPICAL
Qty: 60 ML | Refills: 3 | Status: SHIPPED | OUTPATIENT
Start: 2022-10-14

## 2022-10-14 RX ORDER — ALBUTEROL SULFATE 90 UG/1
AEROSOL, METERED RESPIRATORY (INHALATION)
Qty: 1 EACH | Refills: 0 | Status: SHIPPED | OUTPATIENT
Start: 2022-10-14 | End: 2022-10-14 | Stop reason: SDUPTHER

## 2022-10-14 RX ORDER — LEVOTHYROXINE SODIUM 0.05 MG/1
50 TABLET ORAL DAILY
Qty: 90 TABLET | Refills: 1 | Status: SHIPPED | OUTPATIENT
Start: 2022-10-14 | End: 2023-04-12

## 2022-10-14 RX ORDER — DULOXETIN HYDROCHLORIDE 60 MG/1
CAPSULE, DELAYED RELEASE ORAL
Qty: 90 CAPSULE | Refills: 1 | Status: SHIPPED | OUTPATIENT
Start: 2022-10-14 | End: 2023-08-18

## 2022-10-14 ASSESSMENT — ENCOUNTER SYMPTOMS
SHORTNESS OF BREATH: 1
VOMITING: 0
ABDOMINAL PAIN: 0
EYE PAIN: 0
DIARRHEA: 0
WHEEZING: 0
CHEST TIGHTNESS: 0
NAUSEA: 0
TROUBLE SWALLOWING: 0
BLOOD IN STOOL: 0

## 2022-10-14 NOTE — PROGRESS NOTES
10/14/2022    Gladys Toro    Chief Complaint   Patient presents with    Follow-Up from Hospital    Cirrhosis     Had fluid drained off. Nausea     Started today. Medication Problem     Discuss 2 meds       HPI  History was obtained from the patient and his wife. Evans Kasper is a 78 y.o. male who presents today with post hospital visit he was then [address] 10/4 through 10/6/2022 has known alcoholic cirrhosis and requires multiple frequent paracenteses and thoracenteses. Patient's not felt to be a candidate for TIPS procedure or liver transplant. Mood remains positive -energy levels are good. Felt really good when he got home but in the last few days he is starting to get tired and have a little bit of nausea. Patient severe hearing loss is unchanged. Patient in addition has a history of hypothyroidism, borderline diabetes mellitus type 2, and renal insufficiency. Appropriate questions were asked and advised sought. Was advised to get high-dose flu shot and COVID booster in near future. Patient for the most part is been maintained on titrated doses of Lasix and spironolactone. He is not drinking other than when the peritoneal and pleural fluid accumulates he is in no distress for the most part. Does tire easily-meds are tolerated. Did request some refills on some dermatologic meds. REVIEW OF SYMPTOMS    Review of Systems   Constitutional:  Negative for activity change and fatigue. HENT:  Positive for hearing loss. Negative for congestion, mouth sores and trouble swallowing. Eyes:  Negative for pain and visual disturbance. Respiratory:  Positive for shortness of breath. Negative for chest tightness and wheezing. Recurrent pleural effusion requiring frequent thoracenteses and admissions to hospital this is from alcoholic cirrhosis and transudate from massive ascites. Cardiovascular:  Negative for chest pain and palpitations.    Gastrointestinal:  Negative for abdominal pain, blood in stool, diarrhea, nausea and vomiting. Endocrine: Negative for heat intolerance and polyuria. Genitourinary:  Negative for dysuria, frequency and urgency. Musculoskeletal:  Negative for arthralgias, gait problem and neck stiffness. Skin:  Negative for rash. Allergic/Immunologic: Negative for environmental allergies. Neurological:  Negative for dizziness, seizures, speech difficulty and weakness. Hematological:  Does not bruise/bleed easily. Psychiatric/Behavioral:  Positive for dysphoric mood. Negative for agitation, confusion, hallucinations, self-injury and suicidal ideas. The patient is not nervous/anxious.       PAST MEDICAL HISTORY  Past Medical History:   Diagnosis Date    Anxiety     Cirrhosis, alcoholic ([facility]     [facility]     GERD (gastroesophageal reflux disease)     GERD (gastroesophageal reflux disease)     Hyperlipidemia     Hypertension     Portal hypertension (HCC)     Pulmonary nodules     Sleep apnea     SOB (shortness of breath)     Thyroid disease        FAMILY HISTORY  Family History   Problem Relation Age of Onset    Hypertension Brother     Diabetes Other        SOCIAL HISTORY  Social History     Socioeconomic History    Marital status:      Spouse name: None    Number of children: None    Years of education: None    Highest education level: None   Occupational History     Comment: Retired    Tobacco Use    Smoking status: Former     Packs/day: 1.00     Years: 14.00     Pack years: 14.00     Types: Cigarettes     Quit date:      Years since quittin.8    Smokeless tobacco: Never   Vaping Use    Vaping Use: Never used   Substance and Sexual Activity    Alcohol use: Not Currently    Drug use: Never    Sexual activity: Yes     Partners: Female     Social Determinants of Health     Financial Resource Strain: Low Risk     Difficulty of Paying Living Expenses: Not hard at all   Food Insecurity: No Food Insecurity    Worried About Running Out of Food in the Last Year: Never true    Ran Out of Food in the Last Year: Never true        SURGICAL HISTORY  Past Surgical History:   Procedure Laterality Date    APPENDECTOMY      CHOLECYSTECTOMY      COLONOSCOPY      ENDOSCOPY, COLON, DIAGNOSTIC      FOOT SURGERY      needle removed,not sure which foot, per wife. TONSILLECTOMY      UPPER GASTROINTESTINAL ENDOSCOPY N/A 1/19/2022    EGD BIOPSY performed by Gloria Ortega MD at [address]  Current Outpatient Medications   Medication Sig Dispense Refill    levothyroxine (SYNTHROID) 50 MCG tablet Take 1 tablet by mouth Daily 90 tablet 1    albuterol sulfate HFA (PROVENTIL;VENTOLIN;PROAIR) 108 (90 Base) MCG/ACT inhaler INHALE 2 PUFFS BY MOUTH 4 TIMES DAILY AS NEEDED FOR WHEEZING 1 each 2    DULoxetine (CYMBALTA) 60 MG extended release capsule TAKE 1 CAPSULE BY MOUTH ONCE DAILY 90 capsule 1    betamethasone valerate (VALISONE) 0.1 % lotion Apply topically 2 times daily prn scalp rash 60 mL 3    spironolactone (ALDACTONE) 100 MG tablet Take 100 mg by mouth 2 times daily      rifAXIMin (XIFAXAN) 550 MG tablet Take 1 tablet by mouth 2 times daily 60 tablet 5    furosemide (LASIX) 40 MG tablet Take 1 tablet by mouth in the morning and 1 tablet in the evening. 60 tablet 3    nadolol (CORGARD) 40 MG tablet Take 1 tablet by mouth in the morning. (Patient taking differently: Take 40 mg by mouth nightly) 90 tablet 3    lactulose (CHRONULAC) 10 GM/15ML solution Take 30 mLs by mouth 3 times daily Hold if having diarrhea 2700 mL 5    Multiple Vitamin (MULTI VITAMIN MENS PO) Take 1 tablet by mouth daily      ESOMEPRAZOLE MAGNESIUM PO Take 20 mg by mouth daily OTC      Omega-3 1000 MG CAPS Take 2,000 mg by mouth daily       No current facility-administered medications for this visit.        ALLERGIES  Allergies   Allergen Reactions    Lisinopril Swelling     Tongue swelling      Zetia [Ezetimibe] Swelling     Facial swelling Atorvastatin     Codeine Other (See Comments)     Blood pressure drops    Hydrochlorothiazide     Hydroxyzine      Fatigue and depressed    Lexapro [Escitalopram Oxalate]      Increased depression    Pravastatin        PHYSICAL EXAM    BP 92/64 (Site: Right Upper Arm, Position: Sitting, Cuff Size: Medium Adult)   Pulse 62   Ht 5' 8\" (1.727 m)   Wt 225 lb (102.1 kg)   SpO2 96%   BMI 34.21 kg/m²     Physical Exam  Vitals and nursing note reviewed. Constitutional:       Appearance: He is well-developed. HENT:      Head: Normocephalic and atraumatic. Nose: Nose normal.      Mouth/Throat:      Mouth: Mucous membranes are moist.      Pharynx: Oropharynx is clear. Eyes:      Pupils: Pupils are equal, round, and reactive to light. Cardiovascular:      Rate and Rhythm: Normal rate and regular rhythm. Heart sounds: Normal heart sounds. No gallop. Pulmonary:      Effort: Pulmonary effort is normal. No respiratory distress. Breath sounds: Normal breath sounds. No wheezing, rhonchi or rales. Abdominal:      General: There is distension. Palpations: Abdomen is soft. Tenderness: There is no guarding or rebound. Musculoskeletal:         General: No swelling or deformity. Normal range of motion. Cervical back: Normal range of motion and neck supple. No rigidity. Lymphadenopathy:      Cervical: No cervical adenopathy. Skin:     General: Skin is warm and dry. Coloration: Skin is not jaundiced. Findings: No bruising. Neurological:      General: No focal deficit present. Mental Status: He is alert and oriented to person, place, and time. Mental status is at baseline. Cranial Nerves: No cranial nerve deficit. Motor: No weakness. Psychiatric:         Thought Content: Thought content normal.       ASSESSMENT & PLAN     Diagnosis Orders   1. Bilateral hearing loss, unspecified hearing loss type        2.  Acquired hypothyroidism  levothyroxine (SYNTHROID) 50 MCG tablet      3. Recurrent right pleural effusion        4. Ascites due to alcoholic cirrhosis (HCC)        Refills provided today. Questions answered advised to continue with healthy lifestyle keep appointments with subspecialist.  Get high-dose flu shot and COVID booster when available soon. Call with changes or problems. Return in about 3 months (around 1/14/2023).          Electronically signed by Luly Cruz MD on 10/14/2022

## 2022-10-17 ENCOUNTER — HOSPITAL ENCOUNTER (OUTPATIENT)
Age: 77
Discharge: HOME | End: 2022-10-17
Payer: COMMERCIAL

## 2022-10-17 ENCOUNTER — HOSPITAL ENCOUNTER (OUTPATIENT)
Dept: GENERAL RADIOLOGY | Age: 77
Discharge: HOME | End: 2022-10-17
Payer: COMMERCIAL

## 2022-10-17 ENCOUNTER — OFFICE VISIT (OUTPATIENT)
Dept: PULMONOLOGY | Age: 77
End: 2022-10-17
Payer: COMMERCIAL

## 2022-10-17 VITALS
OXYGEN SATURATION: 94 % | HEIGHT: 68 IN | DIASTOLIC BLOOD PRESSURE: 74 MMHG | BODY MASS INDEX: 34.46 KG/M2 | HEART RATE: 84 BPM | SYSTOLIC BLOOD PRESSURE: 124 MMHG | WEIGHT: 227.4 LBS

## 2022-10-17 DIAGNOSIS — G47.10 HYPERSOMNIA: ICD-10-CM

## 2022-10-17 DIAGNOSIS — E66.9 OBESITY (BMI 30-39.9): ICD-10-CM

## 2022-10-17 DIAGNOSIS — J90 RECURRENT PLEURAL EFFUSION ON RIGHT: ICD-10-CM

## 2022-10-17 DIAGNOSIS — G47.33 OSA (OBSTRUCTIVE SLEEP APNEA): ICD-10-CM

## 2022-10-17 DIAGNOSIS — Z87.891 EX-SMOKER: ICD-10-CM

## 2022-10-17 PROCEDURE — 99204 OFFICE O/P NEW MOD 45 MIN: CPT | Performed by: INTERNAL MEDICINE

## 2022-10-17 PROCEDURE — 71046 X-RAY EXAM CHEST 2 VIEWS: CPT

## 2022-10-17 ASSESSMENT — ENCOUNTER SYMPTOMS
ABDOMINAL PAIN: 0
BACK PAIN: 0
EYE ITCHING: 0
SHORTNESS OF BREATH: 0
ABDOMINAL DISTENTION: 0
COUGH: 0
EYE DISCHARGE: 0

## 2022-10-17 NOTE — PROGRESS NOTES
Tamikokavitha Michelle  1945  Referring Provider:  Kaitlynn Alvarez MD    Subjective:     Chief Complaint   Patient presents with    Follow-Up from [facility] is a 78 y.o. male has come back as a follow up. He has Cirrhosis with recurrent ascites and Right Pleural effusion. He has no SOB, no cough, no fever, no chest pain, no palpitations. His abd is getting bigger. He is on Lactulose and diuresis. He had a PSG done on 09/01/22 which showed that he has no JENNIFER but desat to 87% requiring 2 L/min of oxygen. Current Outpatient Medications   Medication Sig Dispense Refill    levothyroxine (SYNTHROID) 50 MCG tablet Take 1 tablet by mouth Daily 90 tablet 1    albuterol sulfate HFA (PROVENTIL;VENTOLIN;PROAIR) 108 (90 Base) MCG/ACT inhaler INHALE 2 PUFFS BY MOUTH 4 TIMES DAILY AS NEEDED FOR WHEEZING 1 each 2    DULoxetine (CYMBALTA) 60 MG extended release capsule TAKE 1 CAPSULE BY MOUTH ONCE DAILY 90 capsule 1    betamethasone valerate (VALISONE) 0.1 % lotion Apply topically 2 times daily prn scalp rash 60 mL 3    ondansetron (ZOFRAN-ODT) 4 MG disintegrating tablet Take 1 tablet by mouth 3 times daily as needed for Nausea or Vomiting 21 tablet 3    spironolactone (ALDACTONE) 100 MG tablet Take 100 mg by mouth 2 times daily      furosemide (LASIX) 40 MG tablet Take 1 tablet by mouth in the morning and 1 tablet in the evening. 60 tablet 3    rifAXIMin (XIFAXAN) 550 MG tablet Take 1 tablet by mouth 2 times daily 60 tablet 5    lactulose (CHRONULAC) 10 GM/15ML solution Take 30 mLs by mouth 3 times daily Hold if having diarrhea 2700 mL 5    Multiple Vitamin (MULTI VITAMIN MENS PO) Take 1 tablet by mouth daily      ESOMEPRAZOLE MAGNESIUM PO Take 20 mg by mouth daily OTC      Omega-3 1000 MG CAPS Take 2,000 mg by mouth daily      nadolol (CORGARD) 40 MG tablet Take 1 tablet by mouth in the morning.  (Patient taking differently: Take 40 mg by mouth nightly) 90 tablet 3     No current facility-administered medications for this visit. Allergies   Allergen Reactions    Lisinopril Swelling     Tongue swelling      Zetia [Ezetimibe] Swelling     Facial swelling    Atorvastatin     Codeine Other (See Comments)     Blood pressure drops    Hydrochlorothiazide     Hydroxyzine      Fatigue and depressed    Lexapro [Escitalopram Oxalate]      Increased depression    Pravastatin        Past Medical History:   Diagnosis Date    Anxiety     Cirrhosis, alcoholic ([facility]     Diabetes mellitus ([facility]     GERD (gastroesophageal reflux disease)     GERD (gastroesophageal reflux disease)     Hyperlipidemia     Hypertension     Portal hypertension (HCC)     Pulmonary nodules     Sleep apnea     SOB (shortness of breath)     Thyroid disease        Past Surgical History:   Procedure Laterality Date    APPENDECTOMY      CHOLECYSTECTOMY      COLONOSCOPY      ENDOSCOPY, COLON, DIAGNOSTIC      FOOT SURGERY      needle removed,not sure which foot, per wife.     TONSILLECTOMY      UPPER GASTROINTESTINAL ENDOSCOPY N/A 2022    EGD BIOPSY performed by Gallo Yates MD at [address] [place] [address] History     Socioeconomic History    Marital status:      Spouse name: None    Number of children: None    Years of education: None    Highest education level: None   Occupational History     Comment: Retired    Tobacco Use    Smoking status: Former     Packs/day: 1.00     Years: 14.00     Pack years: 14.00     Types: Cigarettes     Quit date:      Years since quittin.8    Smokeless tobacco: Never   Vaping Use    Vaping Use: Never used   Substance and Sexual Activity    Alcohol use: Not Currently    Drug use: Never    Sexual activity: Yes     Partners: Female     Social Determinants of Health     Financial Resource Strain: Low Risk     Difficulty of Paying Living Expenses: Not hard at all   Food Insecurity: No Food Insecurity    Worried About [facility] in the Last Year: Never true    Ran Out of Food in the Last Year: Never true       Review of Systems   Constitutional:  Negative for fatigue. HENT:  Negative for congestion and postnasal drip. Eyes:  Negative for discharge and itching. Respiratory:  Negative for cough and shortness of breath. Cardiovascular:  Negative for chest pain and leg swelling. Gastrointestinal:  Negative for abdominal distention and abdominal pain. Endocrine: Negative for cold intolerance and heat intolerance. Genitourinary:  Negative for enuresis and frequency. Musculoskeletal:  Negative for arthralgias and back pain. Allergic/Immunologic: Negative for environmental allergies and food allergies. Neurological:  Negative for light-headedness and headaches. Hematological:  Negative for adenopathy. Psychiatric/Behavioral:  Negative for agitation and behavioral problems. Objective:   /74   Pulse 84   Ht 5' 8\" (1.727 m)   Wt 227 lb 6.4 oz (103.1 kg)   SpO2 94%   BMI 34.58 kg/m²   Body mass index is 34.58 kg/m². Sleep Medicine 9/1/2022 9/1/2022 8/15/2022 7/17/2019   Sitting and reading 2 - 2 3   Watching TV 2 - 3 2   Sitting, inactive in a public place (e.g. a theatre or a meeting) 2 - 1 2   As a passenger in a car for an hour without a break 2 - 2 3   Lying down to rest in the afternoon when circumstances permit 3 - 3 3   Sitting and talking to someone 1 - 0 0   Sitting quietly after a lunch without alcohol 2 - 2 3   In a car, while stopped for a few minutes in traffic 0 - 0 0   Bradshaw Sleepiness Score 14 - 13 16   Neck circumference (Inches) 16.5 16.5 16.5 18.25     Mallampati 4    Physical Exam  Vitals reviewed. Constitutional:       Appearance: Normal appearance. HENT:      Head: Normocephalic and atraumatic. Nose: Nose normal.      Mouth/Throat:      Mouth: Mucous membranes are moist.   Eyes:      Extraocular Movements: Extraocular movements intact. Pupils: Pupils are equal, round, and reactive to light.    Cardiovascular: Rate and Rhythm: Normal rate and regular rhythm. Pulses: Normal pulses. Heart sounds: Normal heart sounds. Pulmonary:      Effort: Pulmonary effort is normal.      Comments: Decreased right breath sounds  Abdominal:      General: Abdomen is flat. Palpations: Abdomen is soft. Musculoskeletal:         General: Normal range of motion. Cervical back: Normal range of motion and neck supple. Skin:     General: Skin is warm and dry. Neurological:      General: No focal deficit present. Mental Status: He is alert and oriented to person, place, and time. Psychiatric:         Mood and Affect: Mood normal.         Behavior: Behavior normal.       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 9)      Advised to loose weight with diet and exercise                   Follow-Up:    Return in about 2 weeks (around 10/31/2022) for chest x ray.      Progress notes sent to the referring Provider    Elizabet Alejandre MD MD  10/17/2022  3:06 PM

## 2022-10-20 ENCOUNTER — TELEPHONE (OUTPATIENT)
Dept: PULMONOLOGY | Age: 77
End: 2022-10-20

## 2022-10-21 DIAGNOSIS — J90 RECURRENT RIGHT PLEURAL EFFUSION: Primary | ICD-10-CM

## 2022-10-24 ENCOUNTER — OFFICE VISIT (OUTPATIENT)
Dept: GASTROENTEROLOGY | Age: 77
End: 2022-10-24
Payer: COMMERCIAL

## 2022-10-24 ENCOUNTER — TELEPHONE (OUTPATIENT)
Dept: GASTROENTEROLOGY | Age: 77
End: 2022-10-24

## 2022-10-24 VITALS
TEMPERATURE: 97.2 F | BODY MASS INDEX: 36.34 KG/M2 | HEIGHT: 68 IN | DIASTOLIC BLOOD PRESSURE: 62 MMHG | WEIGHT: 239.8 LBS | SYSTOLIC BLOOD PRESSURE: 128 MMHG | HEART RATE: 60 BPM

## 2022-10-24 DIAGNOSIS — R18.8 OTHER ASCITES: ICD-10-CM

## 2022-10-24 DIAGNOSIS — K70.31 ALCOHOLIC CIRRHOSIS OF LIVER WITH ASCITES (HCC): Primary | ICD-10-CM

## 2022-10-24 PROCEDURE — 1124F ACP DISCUSS-NO DSCNMKR DOCD: CPT | Performed by: SPECIALIST

## 2022-10-24 PROCEDURE — 99214 OFFICE O/P EST MOD 30 MIN: CPT | Performed by: SPECIALIST

## 2022-10-24 NOTE — PROGRESS NOTES
IR Procedure at [facility]:  Spoke with patient's wife Petar Marrufo and patient will arrive at 0930 at [facility] on 10/26/2022 for his procedure at 1130. Also went over below instructions. (Paracentesis, Thoracentesis, Thyroid Bx and Injections may have a light breakfast)  2. Follow your directions as prescribed by the doctor for your procedure and medications. 3.   Consult your provider as to when to stop blood thinner  4. Do not take any pain medication within 6 hours of your procedure  5. Do not drink any alcoholic beverages or use any street drugs 24 hours before procedure. 6.   Please wear simple, loose fitting clothing to the hospital.  Do not bring valuables (money,             credit cards, checkbooks, etc.)     7. If you  have a Living Will and Durable Power of  for Healthcare, please bring in a copy. 8.   Please bring picture ID,  insurance card, paperwork from the doctors office            (H & P, Consent,  & card for implantable devices). 9.   Report to the information desk on the ground floor. 10. Take a shower the night before or morning of your procedure, do not apply any lotion, oil or powder. 11. If you are going to be sedated for the procedure, you will need a responsible adult to drive you home.

## 2022-10-24 NOTE — TELEPHONE ENCOUNTER
Paracentesis scheduled 10/28/22  Arrival time 10:30 and procedure time 12:30. Patient scan schedule future procedures - there is a standing order in Epic for every two weeks. The wife of patient expressed understanding.

## 2022-10-24 NOTE — PROGRESS NOTES
CC: Follow up for cirrhosis    SUBJECTIVE:  still with severe ascites and hepatic hydrothorax  Had paracentesis : 10/4/22, 22, 22, 22, 22  Had thoracentesis:   10/5/22, 10/4/22, 22, ,, 22, 22    Last AFP:  22---- 2        Last liver imagin/9/22--- no mass  Last EGD for varices:2022--- mod large varices without overlying red markings  Hepatitis A immunity: Yes - vaccine 2021, 2022  Hepatitis B immunity: vaccine 2021, 2022  . alfts     ROS: non-contributory    OBJECTIVE:   PHYSICAL EXAM:    Vitals:  /62 (Site: Left Upper Arm, Position: Sitting, Cuff Size: Medium Adult)   Pulse 60   Temp 97.2 °F (36.2 °C) (Infrared)   Ht 5' 8\" (1.727 m)   Wt 239 lb 12.8 oz (108.8 kg)   BMI 36.46 kg/m²   CONSTITUTIONAL: alert  EYES:  pupils equal, round and reactive to light and sclera clear  LUNGS:  clear to auscultation  CARDIOVASCULAR:  regular rate and rhythm and no murmur noted  ABDOMEN:  tense ascites, non-tender and no masses palpated, no hepatosplenomegally  NEUROLOGIC: no asterixis or focal deficit detected   EXTREMITIES: no clubbing, cyanosis, 3+ edema    ASSESSMENT:    1) cirrhosis with refractory ascites  2) hepatic hydrothorax    PLAN:   1) paracentesis + BMP every 2 weeks regularly - if this fails to control ascites and hepatic hydrothorax, consider Pleur-X catheter with regular paracentesis by home care once or twice per week--had discussion with patient and wife that peritoneal drains not usually used for benign disease (cirrhosis) but have no other option   2) return visit 3 months

## 2022-10-25 NOTE — PROGRESS NOTES
IR Procedure at [facility]:  Spoke with Alyx Millermenez patient's wife and patient will arrive at [phone] at [facility] on 10/28/2022 for his procedure at 1230. Also went over below instructions. (Paracentesis, Thoracentesis, Thyroid Bx and Injections may have a light breakfast)  2. Follow your directions as prescribed by the doctor for your procedure and medications. 3.   Consult your provider as to when to stop blood thinner  4. Do not take any pain medication within 6 hours of your procedure  5. Do not drink any alcoholic beverages or use any street drugs 24 hours before procedure. 6.   Please wear simple, loose fitting clothing to the hospital.  Do not bring valuables (money,             credit cards, checkbooks, etc.)     7. If you  have a Living Will and Durable Power of  for Healthcare, please bring in a copy. 8.   Please bring picture ID,  insurance card, paperwork from the doctors office            (H & P, Consent,  & card for implantable devices). 9.   Report to the information desk on the ground floor. 10. Take a shower the night before or morning of your procedure, do not apply any lotion, oil or powder. 11. If you are going to be sedated for the procedure, you will need a responsible adult to drive you home.

## 2022-10-26 ENCOUNTER — HOSPITAL ENCOUNTER (OUTPATIENT)
Dept: INTERVENTIONAL RADIOLOGY/VASCULAR | Age: 77
Discharge: HOME | End: 2022-10-26
Payer: COMMERCIAL

## 2022-10-26 ENCOUNTER — HOSPITAL ENCOUNTER (OUTPATIENT)
Dept: GENERAL RADIOLOGY | Age: 77
Discharge: HOME | End: 2022-10-26
Payer: COMMERCIAL

## 2022-10-26 VITALS
RESPIRATION RATE: 16 BRPM | SYSTOLIC BLOOD PRESSURE: 101 MMHG | BODY MASS INDEX: 34.86 KG/M2 | HEIGHT: 68 IN | OXYGEN SATURATION: 98 % | DIASTOLIC BLOOD PRESSURE: 74 MMHG | WEIGHT: 230 LBS | HEART RATE: 61 BPM | TEMPERATURE: 96 F

## 2022-10-26 DIAGNOSIS — E11.9 TYPE 2 DIABETES MELLITUS WITHOUT COMPLICATION, WITHOUT LONG-TERM CURRENT USE OF INSULIN (HCC): ICD-10-CM

## 2022-10-26 DIAGNOSIS — K76.6 PORTAL HYPERTENSION (HCC): ICD-10-CM

## 2022-10-26 DIAGNOSIS — R79.89 INCREASED AMMONIA LEVEL: ICD-10-CM

## 2022-10-26 DIAGNOSIS — R91.8 PULMONARY NODULES: ICD-10-CM

## 2022-10-26 DIAGNOSIS — E78.2 MIXED HYPERLIPIDEMIA: ICD-10-CM

## 2022-10-26 DIAGNOSIS — F41.9 ANXIETY: ICD-10-CM

## 2022-10-26 DIAGNOSIS — R06.02 SOB (SHORTNESS OF BREATH): ICD-10-CM

## 2022-10-26 DIAGNOSIS — N18.9 ACUTE KIDNEY INJURY SUPERIMPOSED ON CHRONIC KIDNEY DISEASE (HCC): ICD-10-CM

## 2022-10-26 DIAGNOSIS — R13.12 OROPHARYNGEAL DYSPHAGIA: ICD-10-CM

## 2022-10-26 DIAGNOSIS — E66.9 OBESITY (BMI 30-39.9): ICD-10-CM

## 2022-10-26 DIAGNOSIS — J90 EXUDATIVE PLEURISY: ICD-10-CM

## 2022-10-26 DIAGNOSIS — R53.1 GENERALIZED WEAKNESS: ICD-10-CM

## 2022-10-26 DIAGNOSIS — L03.116 CELLULITIS OF LEFT LOWER EXTREMITY: ICD-10-CM

## 2022-10-26 DIAGNOSIS — K76.82 HEPATIC ENCEPHALOPATHY: ICD-10-CM

## 2022-10-26 DIAGNOSIS — J90 RECURRENT PLEURAL EFFUSION ON RIGHT: Primary | ICD-10-CM

## 2022-10-26 DIAGNOSIS — K31.7 GASTRIC POLYPS: ICD-10-CM

## 2022-10-26 DIAGNOSIS — N17.9 ACUTE KIDNEY INJURY SUPERIMPOSED ON CHRONIC KIDNEY DISEASE (HCC): ICD-10-CM

## 2022-10-26 DIAGNOSIS — J90 PLEURAL EFFUSION: ICD-10-CM

## 2022-10-26 DIAGNOSIS — I85.10 SECONDARY ESOPHAGEAL VARICES WITHOUT BLEEDING (HCC): ICD-10-CM

## 2022-10-26 DIAGNOSIS — R06.02 SHORTNESS OF BREATH: ICD-10-CM

## 2022-10-26 DIAGNOSIS — G47.33 OSA (OBSTRUCTIVE SLEEP APNEA): ICD-10-CM

## 2022-10-26 DIAGNOSIS — G47.10 HYPERSOMNIA: ICD-10-CM

## 2022-10-26 DIAGNOSIS — J90 RECURRENT RIGHT PLEURAL EFFUSION: ICD-10-CM

## 2022-10-26 DIAGNOSIS — Z87.891 EX-SMOKER: ICD-10-CM

## 2022-10-26 DIAGNOSIS — R73.9 HYPERGLYCEMIA: ICD-10-CM

## 2022-10-26 DIAGNOSIS — E07.9 THYROID DISEASE: ICD-10-CM

## 2022-10-26 DIAGNOSIS — F10.11 HISTORY OF ALCOHOL ABUSE: ICD-10-CM

## 2022-10-26 DIAGNOSIS — K70.31 ASCITES DUE TO ALCOHOLIC CIRRHOSIS (HCC): ICD-10-CM

## 2022-10-26 DIAGNOSIS — Z86.010 HISTORY OF COLONIC POLYPS: ICD-10-CM

## 2022-10-26 DIAGNOSIS — E03.9 ACQUIRED HYPOTHYROIDISM: ICD-10-CM

## 2022-10-26 PROCEDURE — 7100000010 HC PHASE II RECOVERY - FIRST 15 MIN

## 2022-10-26 PROCEDURE — 32555 ASPIRATE PLEURA W/ IMAGING: CPT

## 2022-10-26 PROCEDURE — 7100000011 HC PHASE II RECOVERY - ADDTL 15 MIN

## 2022-10-26 PROCEDURE — 71045 X-RAY EXAM CHEST 1 VIEW: CPT

## 2022-10-26 PROCEDURE — 32555 ASPIRATE PLEURA W/ IMAGING: CPT | Performed by: INTERNAL MEDICINE

## 2022-10-26 RX ORDER — SODIUM CHLORIDE 0.9 % (FLUSH) 0.9 %
10 SYRINGE (ML) INJECTION PRN
Status: DISCONTINUED | OUTPATIENT
Start: 2022-10-26 | End: 2022-10-27 | Stop reason: HOSPADM

## 2022-10-26 RX ADMIN — Medication 10 ML: at 10:10

## 2022-10-26 ASSESSMENT — PAIN SCALES - GENERAL
PAINLEVEL_OUTOF10: 0
PAINLEVEL_OUTOF10: 0

## 2022-10-26 ASSESSMENT — PAIN - FUNCTIONAL ASSESSMENT: PAIN_FUNCTIONAL_ASSESSMENT: 0-10

## 2022-10-26 NOTE — PROCEDURES
Ulices Ruiz is a 78 y.o. male patient. 1. Recurrent pleural effusion on right    2. Exudative pleurisy    3. Pleural effusion    4. Obesity (BMI 30-39.9)    5. Ex-smoker    6. Hypersomnia    7. Thyroid disease    8. Type 2 diabetes mellitus without complication, without long-term current use of insulin ([address]    9. Cellulitis of left lower extremity    10. Oropharyngeal dysphagia    11. Generalized weakness    12. Acute kidney injury superimposed on chronic kidney disease ([address]    13. Recurrent right pleural effusion    14. Hepatic encephalopathy    15. Gastric polyps    16. Secondary esophageal varices without bleeding (HCC)    17. SOB (shortness of breath)    18. Portal hypertension ([address]    19. JENNIFER (obstructive sleep apnea)    20. Increased ammonia level    21. Acquired hypothyroidism    22. Hyperglycemia    23. Pulmonary nodules    24. Anxiety    25. History of alcohol abuse    26. Mixed hyperlipidemia    27. Ascites due to alcoholic cirrhosis (HCC)    28. History of colonic polyps    29. Shortness of breath      Past Medical History:   Diagnosis Date    Anxiety     Cirrhosis, alcoholic ([address]     Diabetes mellitus ([address]     GERD (gastroesophageal reflux disease) 2000    GERD (gastroesophageal reflux disease)     Hyperlipidemia     Hypertension     Portal hypertension (HCC)     Pulmonary nodules     Sleep apnea     SOB (shortness of breath)     Thyroid disease      Blood pressure 110/70, pulse 59, temperature (!) 96 °F (35.6 °C), temperature source Tympanic, resp. rate 18, height 5' 8\" (1.727 m), weight 230 lb (104.3 kg), SpO2 98 %.     Procedures    Westley Mora MD  10/26/2022

## 2022-10-26 NOTE — DISCHARGE INSTRUCTIONS
Thoracentesis: What to Expect at Home  Your Recovery    Thoracentesis (say \"twwx-kq-oqj-EVA-sis\") is a procedure to remove fluid from the space between the lungs and the chest wall (pleural space). This procedure may also be called a \"chest tap. \" It is normal to have a small amount of fluid in the pleural space. But too much fluid can build up because of problems such as infection, heart failure, or lung cancer. The procedure may have been done to help with shortness of breath and pain caused by the fluid buildup. Or you may have had this procedure so the doctor could test the fluid to find the cause of the buildup. Your chest may be sore where the doctor put the needle or catheter into your skin (the puncture site). This usually gets better after a day or two. You can go back to work or your normal activities as soon as you feel up to it. If the doctor sent the fluid to a lab for testing, it may take several days to get the results. The doctor or nurse will discuss the results with you. This care sheet gives you a general idea about how long it will take for you to recover. But each person recovers at a different pace. Follow the steps below to feel better as quickly as possible. How can you care for yourself at home? Activity    Rest when you feel tired. Getting enough sleep will help you recover. Avoid strenuous activities, such as bicycle riding, jogging, weight lifting, or aerobic exercise, until your doctor says it is okay. You may shower. Do not take a bath until the puncture site has healed, or until your doctor tells you it is okay. Ask your doctor when you can drive again. You may need to take 1 or 2 days off from work. It depends on the type of work you do and how you feel. Diet    You can eat your normal diet. Drink plenty of fluids (unless your doctor tells you not to). Medicines    Take pain medicines exactly as directed.   If the doctor gave you a prescription medicine for pain, take it as prescribed. If you are not taking a prescription pain medicine, ask your doctor if you can take an over-the-counter medicine. Do not take two or more pain medicines at the same time unless the doctor told you to. Many pain medicines have acetaminophen, which is Tylenol. Too much acetaminophen (Tylenol) can be harmful. If you think your pain medicine is making you sick to your stomach: Take your medicine after meals (unless your doctor has told you not to). Ask your doctor for a different pain medicine. If your doctor prescribed antibiotics, take them as directed. Do not stop taking them just because you feel better. You need to take the full course of antibiotics. Care of the puncture site    Wash the area daily with warm, soapy water, and pat it dry. Don't use hydrogen peroxide or alcohol, which may delay healing. You may cover the area with a gauze bandage if it weeps or rubs against clothing. Change the bandage every day. Keep the area clean and dry. Follow-up care is a key part of your treatment and safety  Be sure to make and go to all appointments, and call your doctor if you are having problems. Its also a good idea to know your test results and keep a list of the medicines you take. When should you call for help? Call 911 anytime you think you may need emergency care. For example, call if:  You passed out (lost consciousness). You have severe trouble breathing. You have sudden chest pain and shortness of breath, or you cough up blood. Call your doctor now or seek immediate medical care if:    You have shortness of breath that is new or getting worse. You have new or worse pain in your chest, especially when you take a deep breath. You are sick to your stomach or cannot keep fluids down. You have a fever over 100°F.  Bright red blood has soaked through the bandage over your puncture site. You have signs of infection, such as: Increased pain, swelling, warmth, or redness.   Red streaks leading from the puncture site. Pus draining from the puncture site. Swollen lymph nodes in your neck, armpits, or groin. A fever. You cough up a lot more mucus than normal, or your mucus changes color. Watch closely for changes in your health, and be sure to contact your doctor if you have any problems. Where can you learn more? Go to [email or web address]://[email or web address].[email or web address]. org and sign in to your [email or web address] account. Enter E063 in the [employer]) box to learn more about Thoracentesis: What to Expect at Home.     If you do not have an account, please click on the Sign Up Now link. © 9380-3350 Healthwise, Incorporated. Care instructions adapted under license by [facility]. This care instruction is for use with your licensed healthcare professional. If you have questions about a medical condition or this instruction, always ask your healthcare professional. [address] any warranty or liability for your use of this information. Content Version: 76.8.647290;  Last Revised: February 19, 2013     Watch for signs of infection    Elevated temperature, redness or pain at site,   Drainage at incision

## 2022-10-26 NOTE — PROCEDURES
Vipul Arteaga is a 78 y.o. male patient. 1. Exudative pleurisy      Past Medical History:   Diagnosis Date    Anxiety     Cirrhosis, alcoholic ([facility]     Diabetes mellitus ([facility]     GERD (gastroesophageal reflux disease) 2000    GERD (gastroesophageal reflux disease)     Hyperlipidemia     Hypertension     Portal hypertension (HCC)     Pulmonary nodules     Sleep apnea     SOB (shortness of breath)     Thyroid disease      Blood pressure 112/62, pulse 67, temperature (!) 96 °F (35.6 °C), temperature source Tympanic, resp. rate 18, height 5' 8\" (1.727 m), weight 230 lb (104.3 kg), SpO2 96 %. Right thoracentesis under Ultrasound guidance  INDICATION: Recurrent right pleural effusion  ESTIMATED BLOOD LOSS: NONE    Thoracentesis    Date/Time: 10/26/2022 11:55 AM  Performed by: Briana Aguilar MD  Authorized by: Briana Aguilar MD   Consent: Verbal consent obtained. Written consent obtained. Risks and benefits: risks, benefits and alternatives were discussed  Consent given by: patient  Patient understanding: patient states understanding of the procedure being performed  Patient consent: the patient's understanding of the procedure matches consent given  Procedure consent: procedure consent matches procedure scheduled  Relevant documents: relevant documents present and verified  Test results: test results available and properly labeled  Site marked: the operative site was marked  Imaging studies: imaging studies available  Patient identity confirmed: verbally with patient and arm band  Time out: Immediately prior to procedure a \"time out\" was called to verify the correct patient, procedure, equipment, support staff and site/side marked as required.     1% Lidocaine 10 ml  2100 milky fluid drained  Patient tolerated the procedure well  CXR STAT and if no pneumothorax, patient can go home and follow up in 10 days    Briana Aguilar MD  10/26/2022

## 2022-10-26 NOTE — OR NURSING
Right thoracentesis by Dr Ashwini Mandel    PT TRANSPORTED FROM:  [facility]                                  TO THE IR ROOM:    Small      BARRIER PRECAUTIONS & STERILE TECHNIQUE:               Pt placed on VS Monitor. Pt prepped and draped in a sterile fashion with chlorhexadine.     PAIN/LOCAL ANESTHESIA/SEDATION MANAGEMENT:           Local: Lidocaine 1% given by Dr          Sedation:              Fentanyl:             Versed:     INTRAOPERATIVE:           ACCESS TIME:           US/FLUORO: U/S guided          WIRE USED:           SHEATH USED:           CATHETER USED:           FINAL IMAGE TAKEN TO CONFIRM PLACEMENT OF:           CONTRAST/CC:     STERILE DRESSINGS:     SPECIMENS: Right pleural: 2100cc milky yellow fluid removed; no orders to send    EBL:  <7LW          COMPLICATIONS/ OUTCOME:  None         REPORT CALLED TO: SARANYA Moncada nurse

## 2022-10-26 NOTE — PROGRESS NOTES
1125 Pt. Brought back to unit, bedside report received from [place]. Pt. Is A&O, has puncture site is C/D/I. Pt. Provided with crackers and beverage, call light in reach. Pt. Placed on 2L NC, wife at bedside. Matty Castro instructions reviewed with pt and wife, all parties express understanding. 1308 Pt. To [place] home in private vehicle.

## 2022-10-27 ENCOUNTER — TELEPHONE (OUTPATIENT)
Dept: GASTROENTEROLOGY | Age: 77
End: 2022-10-27

## 2022-10-27 DIAGNOSIS — R18.8 CIRRHOSIS OF LIVER WITH ASCITES, UNSPECIFIED HEPATIC CIRRHOSIS TYPE (HCC): Primary | ICD-10-CM

## 2022-10-27 DIAGNOSIS — K74.60 CIRRHOSIS OF LIVER WITH ASCITES, UNSPECIFIED HEPATIC CIRRHOSIS TYPE (HCC): Primary | ICD-10-CM

## 2022-10-28 ENCOUNTER — HOSPITAL ENCOUNTER (OUTPATIENT)
Dept: INTERVENTIONAL RADIOLOGY/VASCULAR | Age: 77
Discharge: HOME | End: 2022-10-28
Payer: COMMERCIAL

## 2022-10-28 VITALS
RESPIRATION RATE: 18 BRPM | HEART RATE: 95 BPM | SYSTOLIC BLOOD PRESSURE: 137 MMHG | OXYGEN SATURATION: 93 % | TEMPERATURE: 96.9 F | DIASTOLIC BLOOD PRESSURE: 63 MMHG

## 2022-10-28 DIAGNOSIS — K70.31 ALCOHOLIC CIRRHOSIS OF LIVER WITH ASCITES (HCC): ICD-10-CM

## 2022-10-28 LAB
FLUID TYPE: NORMAL INDEX
LYMPHOCYTES, BODY FLUID: 50 %
MESOTHELIAL FLUID: 3 /100 WBC
MONOCYTE, FLUID: 44 %
NEUTROPHIL, FLUID: 6 %
OTHER CELLS FLUID: 0
RBC FLUID: 201 /CU MM
WBC FLUID: 255 /CU MM

## 2022-10-28 PROCEDURE — 49083 ABD PARACENTESIS W/IMAGING: CPT

## 2022-10-28 PROCEDURE — 7100000010 HC PHASE II RECOVERY - FIRST 15 MIN

## 2022-10-28 PROCEDURE — 87070 CULTURE OTHR SPECIMN AEROBIC: CPT

## 2022-10-28 PROCEDURE — P9047 ALBUMIN (HUMAN), 25%, 50ML: HCPCS

## 2022-10-28 PROCEDURE — 7100000011 HC PHASE II RECOVERY - ADDTL 15 MIN

## 2022-10-28 PROCEDURE — 87205 SMEAR GRAM STAIN: CPT

## 2022-10-28 PROCEDURE — 89051 BODY FLUID CELL COUNT: CPT

## 2022-10-28 PROCEDURE — 2580000003 HC RX 258

## 2022-10-28 PROCEDURE — 6360000002 HC RX W HCPCS

## 2022-10-28 RX ORDER — ALBUMIN (HUMAN) 12.5 G/50ML
37.5 SOLUTION INTRAVENOUS ONCE
Status: COMPLETED | OUTPATIENT
Start: 2022-10-28 | End: 2022-10-28

## 2022-10-28 RX ADMIN — ALBUMIN (HUMAN) 37.5 G: 12.5 SOLUTION INTRAVENOUS at 12:49

## 2022-10-28 ASSESSMENT — PAIN SCALES - GENERAL: PAINLEVEL_OUTOF10: 0

## 2022-10-28 ASSESSMENT — PAIN - FUNCTIONAL ASSESSMENT: PAIN_FUNCTIONAL_ASSESSMENT: 0-10

## 2022-10-28 NOTE — OR NURSING
Paracentesis by Dr Rudy Lomas    PT TRANSPORTED FROM: [facility]                                   TO THE IR ROOM:   Small       BARRIER PRECAUTIONS & STERILE TECHNIQUE:               Pt placed on VS Monitor. Pt prepped and draped in a sterile fashion with chlorhexadine.     PAIN/LOCAL ANESTHESIA/SEDATION MANAGEMENT:           Local: Lidocaine 1% given by Dr          Sedation: None             Fentanyl:             Versed:   Albumin 37.5g given IV  INTRAOPERATIVE:           ACCESS TIME:           US/FLUORO: U/S guided          WIRE USED:           SHEATH USED:           CATHETER USED: Safe-t-centesis          FINAL IMAGE TAKEN TO CONFIRM PLACEMENT OF:           CONTRAST/CC:     STERILE DRESSINGS: Gauze with tegaderm     SPECIMENS: Ascitic fluid: 5150cc milky light yellow removed; specimen sent to lab    EBL:   <2HO         COMPLICATIONS/ OUTCOME:  None         REPORT CALLED TO: Ernesto alfaro

## 2022-10-28 NOTE — PROGRESS NOTES
1254- Pt returned to SDS rm 22, respirations even and unlabored, VSS, pt alert and oriented. Family at bedside. Pt refuse crackers or beverage at this time.    1257- Discharge instructions reviewed w/ pt and pt family member at bedside, verbalized understanding   1300- Pt assisted to restroom   1310- Pt discharged

## 2022-10-28 NOTE — DISCHARGE INSTRUCTIONS
Watch for signs of infection    Elevated temperature, redness or pain at site,   Drainage at incision,    Advance Care Planning  People with COVID-19 may have no symptoms, mild symptoms, such as fever, cough, and shortness of breath or they may have more severe illness, developing severe and fatal pneumonia. As a result, Advance Care Planning with attention to naming a health care decision maker (someone you trust to make healthcare decisions for you if you could not speak for yourself) and sharing other health care preferences is important BEFORE a possible health crisis. Please contact your Primary Care Provider to discuss Advance Care Planning. Preventing the Spread of Coronavirus Disease 2019 in Homes and Residential Communities  For the most recent information go to [employer].    Prevention steps for People with confirmed or suspected COVID-19 (including persons under investigation) who do not need to be hospitalized  and   People with confirmed COVID-19 who were hospitalized and determined to be medically stable to go home    Your healthcare provider and public health staff will evaluate whether you can be cared for at home. If it is determined that you do not need to be hospitalized and can be isolated at home, you will be monitored by staff from your local or state health department. You should follow the prevention steps below until a healthcare provider or local or state health department says you can return to your normal activities. Stay home except to get medical care  People who are mildly ill with COVID-19 are able to isolate at home during their illness. You should restrict activities outside your home, except for getting medical care. Do not go to work, school, or public areas. Avoid using public transportation, ride-sharing, or taxis.   Separate yourself from other people and animals in your home  People: As much as possible, you should stay in a specific room and away from other people in your home. Also, you should use a separate bathroom, if available. Animals: You should restrict contact with pets and other animals while you are sick with COVID-19, just like you would around other people. Although there have not been reports of pets or other animals becoming sick with COVID-19, it is still recommended that people sick with COVID-19 limit contact with animals until more information is known about the virus. When possible, have another member of your household care for your animals while you are sick. If you are sick with COVID-19, avoid contact with your pet, including petting, snuggling, being kissed or licked, and sharing food. If you must care for your pet or be around animals while you are sick, wash your hands before and after you interact with pets and wear a facemask. Call ahead before visiting your doctor  If you have a medical appointment, call the healthcare provider and tell them that you have or may have COVID-19. This will help the healthcare providers office take steps to keep other people from getting infected or exposed. Wear a facemask  You should wear a facemask when you are around other people (e.g., sharing a room or vehicle) or pets and before you enter a healthcare providers office. If you are not able to wear a facemask (for example, because it causes trouble breathing), then people who live with you should not stay in the same room with you, or they should wear a facemask if they enter your room. Cover your coughs and sneezes  Cover your mouth and nose with a tissue when you cough or sneeze. Throw used tissues in a lined trash can. Immediately wash your hands with soap and water for at least 20 seconds or, if soap and water are not available, clean your hands with an alcohol-based hand  that contains at least 60% alcohol.   Clean your hands often  Wash your hands often with soap and water for at least 20 seconds, especially after blowing your nose, coughing, or sneezing; going to the bathroom; and before eating or preparing food. If soap and water are not readily available, use an alcohol-based hand  with at least 60% alcohol, covering all surfaces of your hands and rubbing them together until they feel dry. Soap and water are the best option if hands are visibly dirty. Avoid touching your eyes, nose, and mouth with unwashed hands. Avoid sharing personal household items  You should not share dishes, drinking glasses, cups, eating utensils, towels, or bedding with other people or pets in your home. After using these items, they should be washed thoroughly with soap and water. Clean all high-touch surfaces everyday  High touch surfaces include counters, tabletops, doorknobs, bathroom fixtures, toilets, phones, keyboards, tablets, and bedside tables. Also, clean any surfaces that may have blood, stool, or body fluids on them. Use a household cleaning spray or wipe, according to the label instructions. Labels contain instructions for safe and effective use of the cleaning product including precautions you should take when applying the product, such as wearing gloves and making sure you have good ventilation during use of the product. Monitor your symptoms  Seek prompt medical attention if your illness is worsening (e.g., difficulty breathing). Before seeking care, call your healthcare provider and tell them that you have, or are being evaluated for, COVID-19. Put on a facemask before you enter the facility. These steps will help the healthcare providers office to keep other people in the office or waiting room from getting infected or exposed. Ask your healthcare provider to call the local or [facility] health department.  Persons who are placed under active monitoring or facilitated self-monitoring should follow instructions provided by their local health department or occupational health professionals, as appropriate. When working with your local health department check their available hours. If you have a medical emergency and need to call 911, notify the dispatch personnel that you have, or are being evaluated for COVID-19. If possible, put on a facemask before emergency medical services arrive. Discontinuing home isolation  Patients with confirmed COVID-19 should remain under home isolation precautions until the risk of secondary transmission to others is thought to be low. The decision to discontinue home isolation precautions should be made on a case-by-case basis, in consultation with healthcare providers and state and local health departments.

## 2022-10-31 LAB
CULTURE: NORMAL
Lab: NORMAL
SPECIMEN: NORMAL

## 2022-11-01 LAB
Lab: NORMAL
TEST NAME: NORMAL

## 2022-11-07 ENCOUNTER — OFFICE VISIT (OUTPATIENT)
Dept: PULMONOLOGY | Age: 77
End: 2022-11-07
Payer: COMMERCIAL

## 2022-11-07 VITALS
OXYGEN SATURATION: 84 % | BODY MASS INDEX: 35.77 KG/M2 | WEIGHT: 236 LBS | SYSTOLIC BLOOD PRESSURE: 132 MMHG | HEART RATE: 69 BPM | HEIGHT: 68 IN | DIASTOLIC BLOOD PRESSURE: 74 MMHG

## 2022-11-07 DIAGNOSIS — J90 RECURRENT RIGHT PLEURAL EFFUSION: ICD-10-CM

## 2022-11-07 DIAGNOSIS — R09.02 HYPOXIA: ICD-10-CM

## 2022-11-07 DIAGNOSIS — E66.9 OBESITY (BMI 30-39.9): ICD-10-CM

## 2022-11-07 DIAGNOSIS — Z87.891 EX-SMOKER: ICD-10-CM

## 2022-11-07 PROCEDURE — 3078F DIAST BP <80 MM HG: CPT | Performed by: INTERNAL MEDICINE

## 2022-11-07 PROCEDURE — 3074F SYST BP LT 130 MM HG: CPT | Performed by: INTERNAL MEDICINE

## 2022-11-07 PROCEDURE — 99204 OFFICE O/P NEW MOD 45 MIN: CPT | Performed by: INTERNAL MEDICINE

## 2022-11-07 ASSESSMENT — ENCOUNTER SYMPTOMS
ABDOMINAL PAIN: 0
ABDOMINAL DISTENTION: 0
EYE DISCHARGE: 0
EYE ITCHING: 0
SHORTNESS OF BREATH: 1
BACK PAIN: 0
COUGH: 0

## 2022-11-07 NOTE — PROGRESS NOTES
Cindy Abbie  1945  Referring Provider: Al Hebert MD    Subjective:     Chief Complaint   Patient presents with    Follow-up     Follow up        HPI  Noé Garcia is a 78 y.o. male has come back as a follow up. He was last seen in the hospital. He was saturating 84% on room today. He has Right Hepatic Hydrothorax. He has recurrent right thoracentesis. He is on Lasix and Spironolactone. He has no cough, no fever, abd distension present. Current Outpatient Medications   Medication Sig Dispense Refill    levothyroxine (SYNTHROID) 50 MCG tablet Take 1 tablet by mouth Daily 90 tablet 1    albuterol sulfate HFA (PROVENTIL;VENTOLIN;PROAIR) 108 (90 Base) MCG/ACT inhaler INHALE 2 PUFFS BY MOUTH 4 TIMES DAILY AS NEEDED FOR WHEEZING 1 each 2    DULoxetine (CYMBALTA) 60 MG extended release capsule TAKE 1 CAPSULE BY MOUTH ONCE DAILY 90 capsule 1    betamethasone valerate (VALISONE) 0.1 % lotion Apply topically 2 times daily prn scalp rash 60 mL 3    ondansetron (ZOFRAN-ODT) 4 MG disintegrating tablet Take 1 tablet by mouth 3 times daily as needed for Nausea or Vomiting 21 tablet 3    spironolactone (ALDACTONE) 100 MG tablet Take 100 mg by mouth 2 times daily      furosemide (LASIX) 40 MG tablet Take 1 tablet by mouth in the morning and 1 tablet in the evening. 60 tablet 3    nadolol (CORGARD) 40 MG tablet Take 1 tablet by mouth in the morning. (Patient taking differently: Take 40 mg by mouth nightly) 90 tablet 3    rifAXIMin (XIFAXAN) 550 MG tablet Take 1 tablet by mouth 2 times daily 60 tablet 5    lactulose (CHRONULAC) 10 GM/15ML solution Take 30 mLs by mouth 3 times daily Hold if having diarrhea 2700 mL 5    Multiple Vitamin (MULTI VITAMIN MENS PO) Take 1 tablet by mouth daily      ESOMEPRAZOLE MAGNESIUM PO Take 20 mg by mouth daily OTC      Omega-3 1000 MG CAPS Take 2,000 mg by mouth daily       No current facility-administered medications for this visit.        Allergies   Allergen Reactions    Lisinopril Swelling     Tongue swelling      Zetia [Ezetimibe] Swelling     Facial swelling    Atorvastatin     Codeine Other (See Comments)     Blood pressure drops    Hydrochlorothiazide     Hydroxyzine      Fatigue and depressed    Lexapro [Escitalopram Oxalate]      Increased depression    Pravastatin        Past Medical History:   Diagnosis Date    Anxiety     Cirrhosis, alcoholic ([facility]     Diabetes mellitus ([facility]     GERD (gastroesophageal reflux disease)     GERD (gastroesophageal reflux disease)     Hyperlipidemia     Hypertension     Portal hypertension (HCC)     Pulmonary nodules     Sleep apnea     SOB (shortness of breath)     Thyroid disease        Past Surgical History:   Procedure Laterality Date    APPENDECTOMY      CHOLECYSTECTOMY      COLONOSCOPY      ENDOSCOPY, COLON, DIAGNOSTIC      FOOT SURGERY      needle removed,not sure which foot, per wife. TONSILLECTOMY      UPPER GASTROINTESTINAL ENDOSCOPY N/A 2022    EGD BIOPSY performed by Bear Short MD at [address] [place] [address] History     Socioeconomic History    Marital status:    Occupational History     Comment: Retired    Tobacco Use    Smoking status: Former     Packs/day: 1.00     Years: 14.00     Pack years: 14.00     Types: Cigarettes     Quit date:      Years since quittin.8    Smokeless tobacco: Never   Vaping Use    Vaping Use: Never used   Substance and Sexual Activity    Alcohol use: Not Currently    Drug use: Never    Sexual activity: Yes     Partners: Female     Social Determinants of Health     Financial Resource Strain: Low Risk     Difficulty of Paying Living Expenses: Not hard at all   Food Insecurity: No Food Insecurity    Worried About Running Out of Food in the Last Year: Never true    Ran Out of Food in the Last Year: Never true       Review of Systems   Constitutional:  Negative for fatigue. HENT:  Negative for congestion and postnasal drip.     Eyes:  Negative for discharge and itching. Respiratory:  Positive for shortness of breath. Negative for cough. Cardiovascular:  Negative for chest pain and leg swelling. Gastrointestinal:  Negative for abdominal distention and abdominal pain. Endocrine: Negative for cold intolerance and heat intolerance. Genitourinary:  Negative for enuresis and frequency. Musculoskeletal:  Negative for arthralgias and back pain. Allergic/Immunologic: Negative for environmental allergies and food allergies. Neurological:  Negative for light-headedness and headaches. Hematological:  Negative for adenopathy. Psychiatric/Behavioral:  Negative for agitation and behavioral problems. Objective:   /74   Pulse 69   Ht 5' 8\" (1.727 m)   Wt 236 lb (107 kg)   SpO2 (!) 84%   BMI 35.88 kg/m²   Body mass index is 35.88 kg/m². Sleep Medicine 9/1/2022 9/1/2022 8/15/2022 7/17/2019   Sitting and reading 2 - 2 3   Watching TV 2 - 3 2   Sitting, inactive in a public place (e.g. a theatre or a meeting) 2 - 1 2   As a passenger in a car for an hour without a break 2 - 2 3   Lying down to rest in the afternoon when circumstances permit 3 - 3 3   Sitting and talking to someone 1 - 0 0   Sitting quietly after a lunch without alcohol 2 - 2 3   In a car, while stopped for a few minutes in traffic 0 - 0 0   [place] Sleepiness Score 14 - 13 16   Neck circumference (Inches) 16.5 16.5 16.5 18.25     Mallampati 3    Physical Exam  Vitals reviewed. Constitutional:       Appearance: Normal appearance. HENT:      Head: Normocephalic and atraumatic. Nose: Nose normal.      Mouth/Throat:      Mouth: Mucous membranes are moist.   Eyes:      Extraocular Movements: Extraocular movements intact. Pupils: Pupils are equal, round, and reactive to light. Cardiovascular:      Rate and Rhythm: Normal rate and regular rhythm. Pulses: Normal pulses. Heart sounds: Normal heart sounds.    Pulmonary:      Effort: Pulmonary effort is normal. Comments: Decreased right breath sounds  Abdominal:      General: There is distension. Palpations: Abdomen is soft. Musculoskeletal:         General: Normal range of motion. Cervical back: Normal range of motion and neck supple. Skin:     General: Skin is warm and dry. Neurological:      General: No focal deficit present. Mental Status: He is alert and oriented to person, place, and time. Psychiatric:         Mood and Affect: Mood normal.         Behavior: Behavior normal.       Radiology: None    Assessment and Plan     Problem List          Respiratory    Recurrent right pleural effusion      Sec to Hepatic Hydrothorax  C.w Diuresis  Right thoracentesis on Tuesday or Wednesday           Relevant Orders    IR THORACENTESIS PLEURAL W IMAGING    Hypoxia      He needs oxygen 2 L.min all the time            Other    Ex-smoker     C/w quitting smoking         Obesity (BMI 30-39. 9)      Advised to loose weight with diet and exercise                   Follow-Up:    Return in about 3 weeks (around 11/28/2022) for chest x ray.      Progress notes sent to the referring Provider    Vernell Krishnan MD MD  11/7/2022  1:23 PM

## 2022-11-09 NOTE — PROGRESS NOTES
IR Procedure at [facility]:  Spoke with patient's wife Rut Khan and patient will arrive at [address] [facility] at [facility] on 11/10/2022 for his procedure at 1230. Also went over below instructions. (Paracentesis, Thoracentesis, Thyroid Bx and Injections may have a light breakfast)  2. Follow your directions as prescribed by the doctor for your procedure and medications. 3.   Consult your provider as to when to stop blood thinner  4. Do not take any pain medication within 6 hours of your procedure  5. Do not drink any alcoholic beverages or use any street drugs 24 hours before procedure. 6.   Please wear simple, loose fitting clothing to the hospital.  Do not bring valuables (money,             credit cards, checkbooks, etc.)     7. If you  have a Living Will and Durable Power of  for Healthcare, please bring in a copy. 8.   Please bring picture ID,  insurance card, paperwork from the doctors office            (H & P, Consent,  & card for implantable devices). 9.   Report to the information desk on the ground floor. 10. Take a shower the night before or morning of your procedure, do not apply any lotion, oil or powder. 11. If you are going to be sedated for the procedure, you will need a responsible adult to drive you home.

## 2022-11-10 ENCOUNTER — HOSPITAL ENCOUNTER (OUTPATIENT)
Dept: GENERAL RADIOLOGY | Age: 77
Discharge: HOME | End: 2022-11-10
Payer: COMMERCIAL

## 2022-11-10 ENCOUNTER — HOSPITAL ENCOUNTER (OUTPATIENT)
Dept: INTERVENTIONAL RADIOLOGY/VASCULAR | Age: 77
Discharge: HOME | End: 2022-11-10
Payer: COMMERCIAL

## 2022-11-10 VITALS
WEIGHT: 241 LBS | BODY MASS INDEX: 36.53 KG/M2 | TEMPERATURE: 97.8 F | SYSTOLIC BLOOD PRESSURE: 95 MMHG | HEART RATE: 61 BPM | DIASTOLIC BLOOD PRESSURE: 63 MMHG | HEIGHT: 68 IN | RESPIRATION RATE: 18 BRPM | OXYGEN SATURATION: 100 %

## 2022-11-10 DIAGNOSIS — J90 RECURRENT PLEURAL EFFUSION ON RIGHT: Primary | ICD-10-CM

## 2022-11-10 DIAGNOSIS — J90 PLEURAL EFFUSION: ICD-10-CM

## 2022-11-10 LAB
APTT: 34.5 SECONDS (ref 25.1–37.1)
HCT VFR BLD CALC: 36.6 % (ref 42–52)
HEMOGLOBIN: 11.7 GM/DL (ref 13.5–18)
INR BLD: 1.02 INDEX
MCH RBC QN AUTO: 33.2 PG (ref 27–31)
MCHC RBC AUTO-ENTMCNC: 32 % (ref 32–36)
MCV RBC AUTO: 104 FL (ref 78–100)
PDW BLD-RTO: 15.9 % (ref 11.7–14.9)
PLATELET # BLD: 183 K/CU MM (ref 140–440)
PMV BLD AUTO: 10.8 FL (ref 7.5–11.1)
PROTHROMBIN TIME: 13.2 SECONDS (ref 11.7–14.5)
RBC # BLD: 3.52 M/CU MM (ref 4.6–6.2)
WBC # BLD: 6.6 K/CU MM (ref 4–10.5)

## 2022-11-10 PROCEDURE — 32555 ASPIRATE PLEURA W/ IMAGING: CPT | Performed by: INTERNAL MEDICINE

## 2022-11-10 PROCEDURE — 32555 ASPIRATE PLEURA W/ IMAGING: CPT

## 2022-11-10 PROCEDURE — 85730 THROMBOPLASTIN TIME PARTIAL: CPT

## 2022-11-10 PROCEDURE — 85610 PROTHROMBIN TIME: CPT

## 2022-11-10 PROCEDURE — 7100000010 HC PHASE II RECOVERY - FIRST 15 MIN

## 2022-11-10 PROCEDURE — 85027 COMPLETE CBC AUTOMATED: CPT

## 2022-11-10 PROCEDURE — 7100000011 HC PHASE II RECOVERY - ADDTL 15 MIN

## 2022-11-10 PROCEDURE — C1729 CATH, DRAINAGE: HCPCS

## 2022-11-10 PROCEDURE — 99024 POSTOP FOLLOW-UP VISIT: CPT | Performed by: INTERNAL MEDICINE

## 2022-11-10 PROCEDURE — 71045 X-RAY EXAM CHEST 1 VIEW: CPT

## 2022-11-10 RX ORDER — SODIUM CHLORIDE 0.9 % (FLUSH) 0.9 %
10 SYRINGE (ML) INJECTION PRN
Status: DISCONTINUED | OUTPATIENT
Start: 2022-11-10 | End: 2022-11-11 | Stop reason: HOSPADM

## 2022-11-10 ASSESSMENT — PAIN - FUNCTIONAL ASSESSMENT: PAIN_FUNCTIONAL_ASSESSMENT: NONE - DENIES PAIN

## 2022-11-10 NOTE — DISCHARGE INSTRUCTIONS
Thoracentesis: What to Expect at Home  Your Recovery    Thoracentesis (say \"memk-tk-tfc-EVA-sis\") is a procedure to remove fluid from the space between the lungs and the chest wall (pleural space). This procedure may also be called a \"chest tap. \" It is normal to have a small amount of fluid in the pleural space. But too much fluid can build up because of problems such as infection, heart failure, or lung cancer. The procedure may have been done to help with shortness of breath and pain caused by the fluid buildup. Or you may have had this procedure so the doctor could test the fluid to find the cause of the buildup. Your chest may be sore where the doctor put the needle or catheter into your skin (the puncture site). This usually gets better after a day or two. You can go back to work or your normal activities as soon as you feel up to it. If the doctor sent the fluid to a lab for testing, it may take several days to get the results. The doctor or nurse will discuss the results with you. This care sheet gives you a general idea about how long it will take for you to recover. But each person recovers at a different pace. Follow the steps below to feel better as quickly as possible. How can you care for yourself at home? Activity    Rest when you feel tired. Getting enough sleep will help you recover. Avoid strenuous activities, such as bicycle riding, jogging, weight lifting, or aerobic exercise, until your doctor says it is okay. You may shower. Do not take a bath until the puncture site has healed, or until your doctor tells you it is okay. Ask your doctor when you can drive again. You may need to take 1 or 2 days off from work. It depends on the type of work you do and how you feel. Diet    You can eat your normal diet. Drink plenty of fluids (unless your doctor tells you not to). Medicines    Take pain medicines exactly as directed.   If the doctor gave you a prescription medicine for pain, take it as prescribed. If you are not taking a prescription pain medicine, ask your doctor if you can take an over-the-counter medicine. Do not take two or more pain medicines at the same time unless the doctor told you to. Many pain medicines have acetaminophen, which is Tylenol. Too much acetaminophen (Tylenol) can be harmful. If you think your pain medicine is making you sick to your stomach: Take your medicine after meals (unless your doctor has told you not to). Ask your doctor for a different pain medicine. If your doctor prescribed antibiotics, take them as directed. Do not stop taking them just because you feel better. You need to take the full course of antibiotics. Care of the puncture site    Wash the area daily with warm, soapy water, and pat it dry. Don't use hydrogen peroxide or alcohol, which may delay healing. You may cover the area with a gauze bandage if it weeps or rubs against clothing. Change the bandage every day. Keep the area clean and dry. Follow-up care is a key part of your treatment and safety  Be sure to make and go to all appointments, and call your doctor if you are having problems. Its also a good idea to know your test results and keep a list of the medicines you take. When should you call for help? Call 911 anytime you think you may need emergency care. For example, call if:  You passed out (lost consciousness). You have severe trouble breathing. You have sudden chest pain and shortness of breath, or you cough up blood. Call your doctor now or seek immediate medical care if:    You have shortness of breath that is new or getting worse. You have new or worse pain in your chest, especially when you take a deep breath. You are sick to your stomach or cannot keep fluids down. You have a fever over 100°F.  Bright red blood has soaked through the bandage over your puncture site. You have signs of infection, such as: Increased pain, swelling, warmth, or redness.   Red streaks leading from the puncture site. Pus draining from the puncture site. Swollen lymph nodes in your neck, armpits, or groin. A fever. You cough up a lot more mucus than normal, or your mucus changes color. Watch closely for changes in your health, and be sure to contact your doctor if you have any problems. Where can you learn more? Go to https://[employer] Holdingspepiceweb.[employer]. org and sign in to your [employer] account. Enter A520 in the [employer]3 creations box to learn more about Thoracentesis: What to Expect at Home.     If you do not have an account, please click on the Sign Up Now link. © 6558-8701 Healthwise, Incorporated. Care instructions adapted under license by [facility]. This care instruction is for use with your licensed healthcare professional. If you have questions about a medical condition or this instruction, always ask your healthcare professional. [address] any warranty or liability for your use of this information. Content Version: 28.6.629036;  Last Revised: February 19, 2013

## 2022-11-10 NOTE — PROGRESS NOTES
[address] instructions reviewed with pt and spouse, all parties express understanding. 1545 Pt. To DC home in private vehicle.

## 2022-11-10 NOTE — PROCEDURES
Анна Temple is a 78 y.o. male patient. No diagnosis found. Past Medical History:   Diagnosis Date    Anxiety     Cirrhosis, alcoholic ([facility]     Diabetes mellitus ([facility] [address]     GERD (gastroesophageal reflux disease) 2000    GERD (gastroesophageal reflux disease)     Hyperlipidemia     Hypertension     Portal hypertension ([facility] [address]     Pulmonary nodules     Sleep apnea     SOB (shortness of breath)     Thyroid disease      There were no vitals taken for this visit. Right Thoracentesis under US guidance  INDICATION: Recurrent Right Pleural effusion  ESTIMATED BLOOD LOSS: None    Thoracentesis    Date/Time: 11/10/2022 1:01 PM  Performed by: Jackie Falk MD  Authorized by: Jackie Falk MD   Consent: Verbal consent obtained. Written consent obtained. Risks and benefits: risks, benefits and alternatives were discussed  Consent given by: patient and spouse  Patient understanding: patient states understanding of the procedure being performed  Patient consent: the patient's understanding of the procedure matches consent given  Procedure consent: procedure consent matches procedure scheduled  Relevant documents: relevant documents present and verified  Test results: test results available and properly labeled  Site marked: the operative site was marked  Imaging studies: imaging studies available  Patient identity confirmed: verbally with patient and arm band  Time out: Immediately prior to procedure a \"time out\" was called to verify the correct patient, procedure, equipment, support staff and site/side marked as required.       1% Lidocaine 10 ml  1st attempt  2100 ml creamy fluid drained  CXR stat    Jackie Falk MD  11/10/2022

## 2022-11-10 NOTE — OR NURSING
PROCEDURE PERFORMED: right thoracentesis    PRIMARY INDICATION FOR PROCEDURE: right pleural effusion    INFORMED CONSENT:  Obtained prior to procedure. Consent placed in chart. JOVITA SCORE PRE PROCEDURE:  10      PT TRANSPORTED FROM:  \[facility]\""                                  TO THE IR ROOM:  Small                     ASSESSMENT: Pt slightly confused today, but following direction/commands. Consent obtained from wife. BARRIER PRECAUTIONS & STERILE TECHNIQUE:               Pt positioned sitting on side of bed for comfort. Warm blankets given. Pt placed on Cardiac Monitor. Pt prepped and draped in a sterile fashion with chlorhexadine.     PAIN/LOCAL ANESTHESIA/SEDATION MANAGEMENT:           Local: Lidocaine 1% given by Dr. Mat Lomas:           ACCESS TIME: 8797          US/FLUORO: US 2 images          CATHETER USED: OneStep         STERILE DRESSINGS: guaze & tegaderm    SPECIMENS: 2000cc cloudy creamy yellow pleural fluid removed    EBL: <1cc         FOLLOW- UP X-RAY: Stat ordered    COMPLICATIONS/ OUTCOME: VSS, pt tolerated well    JOVITA SCORE POST PROCEDURE:  10        REPORT CALLED TO: Aung Kwon \[facility]\""

## 2022-11-10 NOTE — PROGRESS NOTES
Pt returned to room from IR s/p thoracentesis, report received from [place]. Pt's wife at bedside, pt given beverage of choice and cookie. 4x4/tegaderm to puncture site dry and intact.

## 2022-11-11 ENCOUNTER — HOSPITAL ENCOUNTER (OUTPATIENT)
Dept: INTERVENTIONAL RADIOLOGY/VASCULAR | Age: 77
Discharge: HOME | End: 2022-11-11
Payer: COMMERCIAL

## 2022-11-11 VITALS
DIASTOLIC BLOOD PRESSURE: 65 MMHG | HEART RATE: 75 BPM | SYSTOLIC BLOOD PRESSURE: 115 MMHG | OXYGEN SATURATION: 93 % | RESPIRATION RATE: 20 BRPM | TEMPERATURE: 98.3 F

## 2022-11-11 DIAGNOSIS — K74.69 OTHER CIRRHOSIS OF LIVER (HCC): ICD-10-CM

## 2022-11-11 PROCEDURE — 49083 ABD PARACENTESIS W/IMAGING: CPT

## 2022-11-11 PROCEDURE — 7100000010 HC PHASE II RECOVERY - FIRST 15 MIN

## 2022-11-11 PROCEDURE — 7100000011 HC PHASE II RECOVERY - ADDTL 15 MIN

## 2022-11-11 PROCEDURE — 2709999900 IR US GUIDED PARACENTESIS

## 2022-11-11 PROCEDURE — P9047 ALBUMIN (HUMAN), 25%, 50ML: HCPCS

## 2022-11-11 PROCEDURE — 6360000002 HC RX W HCPCS

## 2022-11-11 PROCEDURE — P9047 ALBUMIN (HUMAN), 25%, 50ML: HCPCS | Performed by: RADIOLOGY

## 2022-11-11 PROCEDURE — 6360000002 HC RX W HCPCS: Performed by: RADIOLOGY

## 2022-11-11 RX ORDER — ALBUMIN (HUMAN) 12.5 G/50ML
SOLUTION INTRAVENOUS CONTINUOUS PRN
Status: COMPLETED | OUTPATIENT
Start: 2022-11-11 | End: 2022-11-11

## 2022-11-11 RX ORDER — SODIUM CHLORIDE 0.9 % (FLUSH) 0.9 %
10 SYRINGE (ML) INJECTION PRN
Status: DISCONTINUED | OUTPATIENT
Start: 2022-11-11 | End: 2022-11-12 | Stop reason: HOSPADM

## 2022-11-11 RX ADMIN — ALBUMIN (HUMAN) 37.5 G: 0.25 INJECTION, SOLUTION INTRAVENOUS at 12:14

## 2022-11-11 ASSESSMENT — PAIN SCALES - GENERAL: PAINLEVEL_OUTOF10: 0

## 2022-11-11 ASSESSMENT — PAIN - FUNCTIONAL ASSESSMENT: PAIN_FUNCTIONAL_ASSESSMENT: 0-10

## 2022-11-11 NOTE — OR NURSING
PROCEDURE PERFORMED: paracentesis    PRIMARY INDICATION FOR PROCEDURE: ascites    INFORMED CONSENT:  Obtained prior to procedure. Consent placed in chart. JOVITA SCORE PRE PROCEDURE:  10      PT TRANSPORTED FROM:  [facility]                                  TO THE IR ROOM:  Small                      ASSESSMENT: Pt alert & oriented on RA. Able to move all extremities    BARRIER PRECAUTIONS & STERILE TECHNIQUE:               Pt positioned supine for comfort. Warm blankets given. Pt placed on Cardiac Monitor. Pt prepped and draped in a sterile fashion with chlorhexadine. PAIN/LOCAL ANESTHESIA/SEDATION MANAGEMENT:           Local: Lidocaine 1% given by Dr. Chi Baeo:           ACCESS TIME: 1150          US/FLUORO: US 2 images          CATHETER USED: 6Fr Safe-T-Centesis            STERILE DRESSINGS: guaze & tegaderm    SPECIMENS: 5600cc cloudy white ascitic fluid removed. EBL:  <1cc        FOLLOW- UP X-RAY: None    COMPLICATIONS/ OUTCOME: VSS, pt tolerated well.  37.5g 25% albumin administered IV    JOVITA SCORE POST PROCEDURE: 10        REPORT CALLED TO: Talha BEAVER

## 2022-11-11 NOTE — PROGRESS NOTES
1240:  Pt discharged to home alert and oriented with no c/o abd discomfort. RRLQ abd puncture site cover with drsg, no bleeding or drainage noted. Pt tolerating PO, VSS.

## 2022-11-12 ENCOUNTER — APPOINTMENT (OUTPATIENT)
Dept: GENERAL RADIOLOGY | Age: 77
DRG: 441 | End: 2022-11-12
Payer: MEDICARE

## 2022-11-12 ENCOUNTER — APPOINTMENT (OUTPATIENT)
Dept: CT IMAGING | Age: 77
DRG: 441 | End: 2022-11-12
Payer: MEDICARE

## 2022-11-12 ENCOUNTER — HOSPITAL ENCOUNTER (INPATIENT)
Age: 77
LOS: 3 days | Discharge: HOME | DRG: 441 | End: 2022-11-15
Attending: EMERGENCY MEDICINE | Admitting: STUDENT IN AN ORGANIZED HEALTH CARE EDUCATION/TRAINING PROGRAM
Payer: MEDICARE

## 2022-11-12 DIAGNOSIS — N30.00 ACUTE CYSTITIS WITHOUT HEMATURIA: ICD-10-CM

## 2022-11-12 DIAGNOSIS — K74.60 HEPATIC CIRRHOSIS, UNSPECIFIED HEPATIC CIRRHOSIS TYPE, UNSPECIFIED WHETHER ASCITES PRESENT (HCC): ICD-10-CM

## 2022-11-12 DIAGNOSIS — R41.82 ALTERED MENTAL STATUS, UNSPECIFIED ALTERED MENTAL STATUS TYPE: Primary | ICD-10-CM

## 2022-11-12 DIAGNOSIS — N17.9 AKI (ACUTE KIDNEY INJURY) (HCC): ICD-10-CM

## 2022-11-12 LAB
ALBUMIN SERPL-MCNC: 3.3 GM/DL (ref 3.4–5)
ALP BLD-CCNC: 96 IU/L (ref 40–129)
ALT SERPL-CCNC: 23 U/L (ref 10–40)
AMMONIA: 66 UMOL/L (ref 16–60)
ANION GAP SERPL CALCULATED.3IONS-SCNC: 11 MMOL/L (ref 4–16)
AST SERPL-CCNC: 40 IU/L (ref 15–37)
BACTERIA: NEGATIVE /HPF
BASOPHILS ABSOLUTE: 0 K/CU MM
BASOPHILS RELATIVE PERCENT: 0.4 % (ref 0–1)
BILIRUB SERPL-MCNC: 0.9 MG/DL (ref 0–1)
BILIRUBIN URINE: NEGATIVE MG/DL
BLOOD, URINE: NEGATIVE
BUN BLDV-MCNC: 35 MG/DL (ref 6–23)
CALCIUM SERPL-MCNC: 10.1 MG/DL (ref 8.3–10.6)
CHLORIDE BLD-SCNC: 97 MMOL/L (ref 99–110)
CLARITY: CLEAR
CO2: 29 MMOL/L (ref 21–32)
COLOR: YELLOW
CREAT SERPL-MCNC: 1.9 MG/DL (ref 0.9–1.3)
DIFFERENTIAL TYPE: ABNORMAL
EKG ATRIAL RATE: 73 BPM
EKG DIAGNOSIS: NORMAL
EKG P AXIS: -11 DEGREES
EKG P-R INTERVAL: 132 MS
EKG Q-T INTERVAL: 416 MS
EKG QRS DURATION: 78 MS
EKG QTC CALCULATION (BAZETT): 458 MS
EKG R AXIS: 46 DEGREES
EKG T AXIS: 23 DEGREES
EKG VENTRICULAR RATE: 73 BPM
EOSINOPHILS ABSOLUTE: 0.3 K/CU MM
EOSINOPHILS RELATIVE PERCENT: 3.6 % (ref 0–3)
GFR SERPL CREATININE-BSD FRML MDRD: 37 ML/MIN/1.73M2
GLUCOSE BLD-MCNC: 112 MG/DL (ref 70–99)
GLUCOSE, URINE: NEGATIVE MG/DL
HCT VFR BLD CALC: 37.9 % (ref 42–52)
HEMOGLOBIN: 12.4 GM/DL (ref 13.5–18)
IMMATURE NEUTROPHIL %: 0.4 % (ref 0–0.43)
KETONES, URINE: NEGATIVE MG/DL
LACTIC ACID, SEPSIS: 1.3 MMOL/L (ref 0.5–1.9)
LACTIC ACID, SEPSIS: 1.5 MMOL/L (ref 0.5–1.9)
LEUKOCYTE ESTERASE, URINE: ABNORMAL
LYMPHOCYTES ABSOLUTE: 0.6 K/CU MM
LYMPHOCYTES RELATIVE PERCENT: 8.6 % (ref 24–44)
MCH RBC QN AUTO: 33.3 PG (ref 27–31)
MCHC RBC AUTO-ENTMCNC: 32.7 % (ref 32–36)
MCV RBC AUTO: 101.9 FL (ref 78–100)
MONOCYTES ABSOLUTE: 0.8 K/CU MM
MONOCYTES RELATIVE PERCENT: 11.2 % (ref 0–4)
NITRITE URINE, QUANTITATIVE: POSITIVE
NUCLEATED RBC %: 0 %
PDW BLD-RTO: 15.4 % (ref 11.7–14.9)
PH, URINE: 7 (ref 5–8)
PLATELET # BLD: 184 K/CU MM (ref 140–440)
PMV BLD AUTO: 10.5 FL (ref 7.5–11.1)
POTASSIUM SERPL-SCNC: 4.5 MMOL/L (ref 3.5–5.1)
PROTEIN UA: NEGATIVE MG/DL
RBC # BLD: 3.72 M/CU MM (ref 4.6–6.2)
RBC URINE: ABNORMAL /HPF (ref 0–3)
SEGMENTED NEUTROPHILS ABSOLUTE COUNT: 5.7 K/CU MM
SEGMENTED NEUTROPHILS RELATIVE PERCENT: 75.8 % (ref 36–66)
SODIUM BLD-SCNC: 137 MMOL/L (ref 135–145)
SPECIFIC GRAVITY UA: 1.01 (ref 1–1.03)
SQUAMOUS EPITHELIAL: <1 /HPF
TOTAL IMMATURE NEUTOROPHIL: 0.03 K/CU MM
TOTAL NUCLEATED RBC: 0 K/CU MM
TOTAL PROTEIN: 7.3 GM/DL (ref 6.4–8.2)
UROBILINOGEN, URINE: 1 MG/DL (ref 0.2–1)
WBC # BLD: 7.5 K/CU MM (ref 4–10.5)
WBC UA: 11 /HPF (ref 0–2)

## 2022-11-12 PROCEDURE — 82140 ASSAY OF AMMONIA: CPT

## 2022-11-12 PROCEDURE — 87077 CULTURE AEROBIC IDENTIFY: CPT

## 2022-11-12 PROCEDURE — 85025 COMPLETE CBC W/AUTO DIFF WBC: CPT

## 2022-11-12 PROCEDURE — 87086 URINE CULTURE/COLONY COUNT: CPT

## 2022-11-12 PROCEDURE — 6360000002 HC RX W HCPCS: Performed by: NURSE PRACTITIONER

## 2022-11-12 PROCEDURE — 2580000003 HC RX 258: Performed by: NURSE PRACTITIONER

## 2022-11-12 PROCEDURE — 6370000000 HC RX 637 (ALT 250 FOR IP): Performed by: NURSE PRACTITIONER

## 2022-11-12 PROCEDURE — 94761 N-INVAS EAR/PLS OXIMETRY MLT: CPT

## 2022-11-12 PROCEDURE — 83605 ASSAY OF LACTIC ACID: CPT

## 2022-11-12 PROCEDURE — 70450 CT HEAD/BRAIN W/O DYE: CPT

## 2022-11-12 PROCEDURE — 71045 X-RAY EXAM CHEST 1 VIEW: CPT

## 2022-11-12 PROCEDURE — 93010 ELECTROCARDIOGRAM REPORT: CPT | Performed by: INTERNAL MEDICINE

## 2022-11-12 PROCEDURE — 87186 SC STD MICRODIL/AGAR DIL: CPT

## 2022-11-12 PROCEDURE — 93005 ELECTROCARDIOGRAM TRACING: CPT | Performed by: NURSE PRACTITIONER

## 2022-11-12 PROCEDURE — 87040 BLOOD CULTURE FOR BACTERIA: CPT

## 2022-11-12 PROCEDURE — 81001 URINALYSIS AUTO W/SCOPE: CPT

## 2022-11-12 PROCEDURE — 99285 EMERGENCY DEPT VISIT HI MDM: CPT

## 2022-11-12 PROCEDURE — 1200000000 HC SEMI PRIVATE

## 2022-11-12 PROCEDURE — 80053 COMPREHEN METABOLIC PANEL: CPT

## 2022-11-12 RX ORDER — SODIUM CHLORIDE 9 MG/ML
25 INJECTION, SOLUTION INTRAVENOUS PRN
Status: DISCONTINUED | OUTPATIENT
Start: 2022-11-12 | End: 2022-11-15 | Stop reason: HOSPADM

## 2022-11-12 RX ORDER — DULOXETIN HYDROCHLORIDE 30 MG/1
60 CAPSULE, DELAYED RELEASE ORAL DAILY
Status: DISCONTINUED | OUTPATIENT
Start: 2022-11-12 | End: 2022-11-15 | Stop reason: HOSPADM

## 2022-11-12 RX ORDER — SODIUM CHLORIDE 0.9 % (FLUSH) 0.9 %
5-40 SYRINGE (ML) INJECTION EVERY 12 HOURS SCHEDULED
Status: DISCONTINUED | OUTPATIENT
Start: 2022-11-12 | End: 2022-11-15 | Stop reason: HOSPADM

## 2022-11-12 RX ORDER — ONDANSETRON 2 MG/ML
4 INJECTION INTRAMUSCULAR; INTRAVENOUS EVERY 6 HOURS PRN
Status: DISCONTINUED | OUTPATIENT
Start: 2022-11-12 | End: 2022-11-15 | Stop reason: HOSPADM

## 2022-11-12 RX ORDER — CHLORAL HYDRATE 500 MG
2000 CAPSULE ORAL DAILY
Status: DISCONTINUED | OUTPATIENT
Start: 2022-11-12 | End: 2022-11-12 | Stop reason: CLARIF

## 2022-11-12 RX ORDER — LACTULOSE 10 G/15ML
20 SOLUTION ORAL 3 TIMES DAILY
Status: DISCONTINUED | OUTPATIENT
Start: 2022-11-12 | End: 2022-11-12

## 2022-11-12 RX ORDER — SODIUM CHLORIDE 0.9 % (FLUSH) 0.9 %
5-40 SYRINGE (ML) INJECTION PRN
Status: DISCONTINUED | OUTPATIENT
Start: 2022-11-12 | End: 2022-11-15 | Stop reason: HOSPADM

## 2022-11-12 RX ORDER — FUROSEMIDE 40 MG/1
40 TABLET ORAL 2 TIMES DAILY
Status: DISCONTINUED | OUTPATIENT
Start: 2022-11-12 | End: 2022-11-15

## 2022-11-12 RX ORDER — ENOXAPARIN SODIUM 100 MG/ML
30 INJECTION SUBCUTANEOUS 2 TIMES DAILY
Status: DISCONTINUED | OUTPATIENT
Start: 2022-11-12 | End: 2022-11-12

## 2022-11-12 RX ORDER — LACTULOSE 10 G/15ML
30 SOLUTION ORAL 3 TIMES DAILY
Status: DISCONTINUED | OUTPATIENT
Start: 2022-11-12 | End: 2022-11-14

## 2022-11-12 RX ORDER — ESOMEPRAZOLE MAGNESIUM 40 MG/1
20 FOR SUSPENSION ORAL DAILY
Status: DISCONTINUED | OUTPATIENT
Start: 2022-11-12 | End: 2022-11-12 | Stop reason: CLARIF

## 2022-11-12 RX ORDER — NADOLOL 20 MG/1
40 TABLET ORAL NIGHTLY
Status: DISCONTINUED | OUTPATIENT
Start: 2022-11-12 | End: 2022-11-15 | Stop reason: HOSPADM

## 2022-11-12 RX ORDER — LACTULOSE 10 G/15ML
30 SOLUTION ORAL ONCE
Status: COMPLETED | OUTPATIENT
Start: 2022-11-12 | End: 2022-11-12

## 2022-11-12 RX ORDER — ALBUTEROL SULFATE 90 UG/1
2 AEROSOL, METERED RESPIRATORY (INHALATION) 4 TIMES DAILY
Status: DISCONTINUED | OUTPATIENT
Start: 2022-11-12 | End: 2022-11-15 | Stop reason: HOSPADM

## 2022-11-12 RX ORDER — ONDANSETRON 4 MG/1
4 TABLET, ORALLY DISINTEGRATING ORAL EVERY 8 HOURS PRN
Status: DISCONTINUED | OUTPATIENT
Start: 2022-11-12 | End: 2022-11-15 | Stop reason: HOSPADM

## 2022-11-12 RX ORDER — LEVOTHYROXINE SODIUM 0.05 MG/1
50 TABLET ORAL DAILY
Status: DISCONTINUED | OUTPATIENT
Start: 2022-11-13 | End: 2022-11-15 | Stop reason: HOSPADM

## 2022-11-12 RX ORDER — POLYETHYLENE GLYCOL 3350 17 G/17G
17 POWDER, FOR SOLUTION ORAL DAILY PRN
Status: DISCONTINUED | OUTPATIENT
Start: 2022-11-12 | End: 2022-11-15 | Stop reason: HOSPADM

## 2022-11-12 RX ORDER — ENOXAPARIN SODIUM 100 MG/ML
40 INJECTION SUBCUTANEOUS DAILY
Status: DISCONTINUED | OUTPATIENT
Start: 2022-11-12 | End: 2022-11-15 | Stop reason: HOSPADM

## 2022-11-12 RX ORDER — OMEGA-3-ACID ETHYL ESTERS 1 G/1
2 CAPSULE, LIQUID FILLED ORAL DAILY
Status: DISCONTINUED | OUTPATIENT
Start: 2022-11-13 | End: 2022-11-15 | Stop reason: HOSPADM

## 2022-11-12 RX ORDER — SPIRONOLACTONE 50 MG/1
100 TABLET, FILM COATED ORAL 2 TIMES DAILY
Status: DISCONTINUED | OUTPATIENT
Start: 2022-11-12 | End: 2022-11-15 | Stop reason: HOSPADM

## 2022-11-12 RX ORDER — PANTOPRAZOLE SODIUM 40 MG/1
40 TABLET, DELAYED RELEASE ORAL
Status: DISCONTINUED | OUTPATIENT
Start: 2022-11-13 | End: 2022-11-15 | Stop reason: HOSPADM

## 2022-11-12 RX ADMIN — FUROSEMIDE 40 MG: 40 TABLET ORAL at 21:44

## 2022-11-12 RX ADMIN — RIFAXIMIN 400 MG: 200 TABLET ORAL at 21:51

## 2022-11-12 RX ADMIN — LACTULOSE 30 G: 10 SOLUTION ORAL at 21:43

## 2022-11-12 RX ADMIN — NADOLOL 40 MG: 20 TABLET ORAL at 21:44

## 2022-11-12 RX ADMIN — CEFTRIAXONE SODIUM 1000 MG: 1 INJECTION, POWDER, FOR SOLUTION INTRAMUSCULAR; INTRAVENOUS at 18:51

## 2022-11-12 RX ADMIN — SPIRONOLACTONE 100 MG: 50 TABLET ORAL at 21:44

## 2022-11-12 RX ADMIN — DULOXETINE HYDROCHLORIDE 60 MG: 30 CAPSULE, DELAYED RELEASE ORAL at 21:51

## 2022-11-12 RX ADMIN — SODIUM CHLORIDE, PRESERVATIVE FREE 10 ML: 5 INJECTION INTRAVENOUS at 21:54

## 2022-11-12 RX ADMIN — ENOXAPARIN SODIUM 40 MG: 100 INJECTION SUBCUTANEOUS at 21:43

## 2022-11-12 RX ADMIN — LACTULOSE 30 G: 10 SOLUTION ORAL at 17:07

## 2022-11-12 ASSESSMENT — PAIN SCALES - GENERAL
PAINLEVEL_OUTOF10: 0
PAINLEVEL_OUTOF10: 0

## 2022-11-12 ASSESSMENT — PAIN - FUNCTIONAL ASSESSMENT: PAIN_FUNCTIONAL_ASSESSMENT: 0-10

## 2022-11-12 NOTE — ED PROVIDER NOTES
EMERGENCY DEPARTMENT ENCOUNTER      PCP: Yoli Courtney MD    [address]    Chief Complaint   Patient presents with    Altered Mental Status         Of note, this patient was also evaluated by the attending physician, Dr. Diana Arenas. HPI    Jane Tobias is a 78 y.o. male with history of chronic kidney disease, cirrhosis of the liver with ascites, esophageal varices, type II DM who presents via ambulance with complaints of altered mental status. The patient states he woke up this morning and felt very confused. He states he went to make a couple coffee and did not know how to. States yesterday he did have a paracentesis. He denies any pain or fevers. Nothing has alleviated or exacerbated his symptoms. Came to bedside and states the patient woke up at approximately 430 this morning. She states that 830 he became confused and started unplugging everything in the house. She states he then put on his shirt and jacket, however did not put on pants. She states his speech is baseline for him. She states she has noticed his face has been flushed all day. REVIEW OF SYSTEMS   Constitutional:  Denies fever, chills, weight loss or weakness. Complains of confusion and altered mental status. Eyes:   Denies discharge, diplopia, blurred vision, or loss visual field  HENT:  Denies sore throat or ear pain. Denies hearing loss. Heart:  Denies chest pain  Lungs:  Denies shortness of breath  GI  Denies abdominal pain. :  Denies Dysuria or Hematuria. Musculoskeletal:  Denies back pain. Skin:  Denies rash   Endocrine:  Denies polyuria or polydypsia   Lymphatic:  Denies swollen glands   Psychiatric:   Denies depression, suicidal ideation or homicidal ideation     Neurologic:  See HPI. Denies headache. Denies light-headedness,, or LOC. Denies stiff neck.   Denies weakness or sensory changes     All other review of systems negative at this time  See HPI and nursing notes for additional information      PAST MEDICAL & SURGICAL HISTORY    Past Medical History:   Diagnosis Date    Anxiety     Cirrhosis, alcoholic ([facility] [address]     Diabetes mellitus ([facility] [address]     GERD (gastroesophageal reflux disease) 2000    GERD (gastroesophageal reflux disease)     Hyperlipidemia     Hypertension     Portal hypertension (HCC)     Pulmonary nodules     Sleep apnea     SOB (shortness of breath)     Thyroid disease      Past Surgical History:   Procedure Laterality Date    APPENDECTOMY      CHOLECYSTECTOMY      COLONOSCOPY      ENDOSCOPY, COLON, DIAGNOSTIC      FOOT SURGERY      needle removed,not sure which foot, per wife. TONSILLECTOMY      UPPER GASTROINTESTINAL ENDOSCOPY N/A 1/19/2022    EGD BIOPSY performed by Lily Mittal MD at [facility]    Current Outpatient Rx   Medication Sig Dispense Refill    levothyroxine (SYNTHROID) 50 MCG tablet Take 1 tablet by mouth Daily 90 tablet 1    albuterol sulfate HFA (PROVENTIL;VENTOLIN;PROAIR) 108 (90 Base) MCG/ACT inhaler INHALE 2 PUFFS BY MOUTH 4 TIMES DAILY AS NEEDED FOR WHEEZING 1 each 2    DULoxetine (CYMBALTA) 60 MG extended release capsule TAKE 1 CAPSULE BY MOUTH ONCE DAILY 90 capsule 1    betamethasone valerate (VALISONE) 0.1 % lotion Apply topically 2 times daily prn scalp rash 60 mL 3    ondansetron (ZOFRAN-ODT) 4 MG disintegrating tablet Take 1 tablet by mouth 3 times daily as needed for Nausea or Vomiting (Patient not taking: No sig reported) 21 tablet 3    spironolactone (ALDACTONE) 100 MG tablet Take 100 mg by mouth 2 times daily      furosemide (LASIX) 40 MG tablet Take 1 tablet by mouth in the morning and 1 tablet in the evening. 60 tablet 3    nadolol (CORGARD) 40 MG tablet Take 1 tablet by mouth in the morning.  (Patient taking differently: Take 40 mg by mouth nightly) 90 tablet 3    rifAXIMin (XIFAXAN) 550 MG tablet Take 1 tablet by mouth 2 times daily 60 tablet 5    lactulose (CHRONULAC) 10 GM/15ML solution Take 30 mLs by mouth 3 times daily Hold if having diarrhea 2700 mL 5    Multiple Vitamin (MULTI VITAMIN MENS PO) Take 1 tablet by mouth daily      ESOMEPRAZOLE MAGNESIUM PO Take 20 mg by mouth daily OTC      Omega-3 1000 MG CAPS Take 2,000 mg by mouth daily         ALLERGIES    Allergies   Allergen Reactions    Lisinopril Swelling     Tongue swelling      Zetia [Ezetimibe] Swelling     Facial swelling    Atorvastatin     Codeine Other (See Comments)     Blood pressure drops    Hydrochlorothiazide     Hydroxyzine      Fatigue and depressed    Lexapro [Escitalopram Oxalate]      Increased depression    Pravastatin        SOCIAL & FAMILY HISTORY    Social History     Socioeconomic History    Marital status:    Occupational History     Comment: Retired    Tobacco Use    Smoking status: Former     Packs/day: 1.00     Years: 14.00     Pack years: 14.00     Types: Cigarettes     Quit date:      Years since quittin.8    Smokeless tobacco: Never   Vaping Use    Vaping Use: Never used   Substance and Sexual Activity    Alcohol use: Not Currently    Drug use: Never    Sexual activity: Yes     Partners: Female     Social Determinants of Health     Financial Resource Strain: Low Risk     Difficulty of Paying Living Expenses: Not hard at all   Food Insecurity: No Food Insecurity    Worried About Running Out of Food in the Last Year: Never true    Ran Out of Food in the Last Year: Never true     Family History   Problem Relation Age of Onset    Hypertension Brother     Diabetes Other        PHYSICAL EXAM    VITAL SIGNS: /80   Pulse 67   Temp 97.9 °F (36.6 °C) (Oral)   Resp 13   Ht 5' 8\" (1.727 m)   Wt 242 lb (109.8 kg)   SpO2 95%   BMI 36.80 kg/m²    Constitutional:  Well developed, well nourished, no acute distress     HENT:  Atraumatic.    hearing intact and equal bilaterally  Ear canals clear, TMs clear  mastoids without redness, warmth, swelling  Eyes: Conjunctiva clear. No tearing .  Pupils equally round and react to light, extraocular movement are intact. No obvious nystagmus (central - horizontal, vertical, rotary) (peripheral - usually only horizontal which fatigues w/ time or fixation on object)  Neck: supple, no JVD. No murmurs heard  Cardiovascular:  Reg rate & rhythm, no murmurs/rubs/gallops. Respiratory:  Lungs Clear, no retractions   GI:  Soft, nontender, normal bowel sound  Musculoskeletal:  No edema, no deformities  Integument:  Well hydrated, no rash, no petechiae   Psych: Pleasant affect, no hallucinations       Neurologic:    - Alert & oriented person, place, time, and situation, no speech difficulties or slurring.  - No obvious gross motor deficits  - Cranial nerves 2-12 grossly intact  - Sensation intact to light touch  - Strength 5/5 in upper and lower extremities bilaterally  - Normal finger to nose test bilaterally  - Normal heel-shin bilaterally  - No pronator drift.   - Light touch sensation intact throughout.  -No truncal ataxia          NIH Stroke Scale  (Total score at bottom)      (1A) LOC  (Alert?):  0 - alert; keenly responsive    (1B) LOC Questions (Month / Age):  0 - answers both questions correctly     (1C) LOC Command  (Close eyes, squeeze my hands):  0 - performs both tasks correctly    (2) Best Gaze  (Eyes open-patient follows finger or face):  0 - normal    (3) Visual  (Introduce visual stimulus to patient's visual field quadrants):  0 - no visual loss    (4)  Facial palsy  (Show teeth, raise eyebrows and squeeze eyes shut):  0 - normal symmetric movement    (5) Motor arms  (Elevate extremity to 90° in sitting or 45° if supine -  score drift/movement)       (5A)  motor arm LEFT :  0 - no drift, limb holds 90 (or 45) degrees for full 10 seconds       (5B) motor arm RIGHT:   0 - no drift, limb holds 90 (or 45) degrees for full 10 seconds      (6) Motor legs  (Elevate extremity to 30° while supine and score drift/movement)       (6A)  motor leg LEFT:   0 - no drift; leg holds 30 degree position for full 5 seconds       (6B) motor leg RIGHT:  0 - no drift; leg holds 30 degree position for full 5 seconds      (7)  Limb Ataxia  (Finger-nose, heel-shin):  0 - absent    (8) Sensory  (face, arms trunk, and legs-compare side to side):  0 - normal; no sensory loss    (9)  Best language  (Name items, describes a picture, read sentence-see attached testing cards):  0 - no aphasia, normal    (10)  Dysarthria  (Evaluate speech clarity by patient repeating listed words):  0 - normal    (11)  Extinction and inattention  (can feel \"left, right, or both sides\" of face, arms, legs w/ closed eyes) (can see moving index finger in \"left, right, or both\":  0 - no abnormality        Total NIHSS:  0                    LABS:  Results for orders placed or performed during the hospital encounter of 11/12/22   CBC with Auto Differential   Result Value Ref Range    WBC 7.5 4.0 - 10.5 K/CU MM    RBC 3.72 (L) 4.6 - 6.2 M/CU MM    Hemoglobin 12.4 (L) 13.5 - 18.0 GM/DL    Hematocrit 37.9 (L) 42 - 52 %    .9 (H) 78 - 100 FL    MCH 33.3 (H) 27 - 31 PG    MCHC 32.7 32.0 - 36.0 %    RDW 15.4 (H) 11.7 - 14.9 %    Platelets 196 442 - 601 K/CU MM    MPV 10.5 7.5 - 11.1 FL    Differential Type AUTOMATED DIFFERENTIAL     Segs Relative 75.8 (H) 36 - 66 %    Lymphocytes % 8.6 (L) 24 - 44 %    Monocytes % 11.2 (H) 0 - 4 %    Eosinophils % 3.6 (H) 0 - 3 %    Basophils % 0.4 0 - 1 %    Segs Absolute 5.7 K/CU MM    Lymphocytes Absolute 0.6 K/CU MM    Monocytes Absolute 0.8 K/CU MM    Eosinophils Absolute 0.3 K/CU MM    Basophils Absolute 0.0 K/CU MM    Nucleated RBC % 0.0 %    Total Nucleated RBC 0.0 K/CU MM    Total Immature Neutrophil 0.03 K/CU MM    Immature Neutrophil % 0.4 0 - 0.43 %   Comprehensive Metabolic Panel   Result Value Ref Range    Sodium 137 135 - 145 MMOL/L    Potassium 4.5 3.5 - 5.1 MMOL/L    Chloride 97 (L) 99 - 110 mMol/L    CO2 29 21 - 32 MMOL/L    BUN 35 (H) 6 - 23 MG/DL    Creatinine 1.9 (H) 0.9 - 1.3 MG/DL    Est, Glom Filt Rate 37 (L) >60 mL/min/1.73m2    Glucose 112 (H) 70 - 99 MG/DL    Calcium 10.1 8.3 - 10.6 MG/DL    Albumin 3.3 (L) 3.4 - 5.0 GM/DL    Total Protein 7.3 6.4 - 8.2 GM/DL    Total Bilirubin 0.9 0.0 - 1.0 MG/DL    ALT 23 10 - 40 U/L    AST 40 (H) 15 - 37 IU/L    Alkaline Phosphatase 96 40 - 129 IU/L    Anion Gap 11 4 - 16   Ammonia   Result Value Ref Range    Ammonia 66 (H) 16 - 60 UMOL/L   Lactate, Sepsis   Result Value Ref Range    Lactic Acid, Sepsis 1.5 0.5 - 1.9 mMOL/L   Lactate, Sepsis   Result Value Ref Range    Lactic Acid, Sepsis 1.3 0.5 - 1.9 mMOL/L   Urinalysis   Result Value Ref Range    Color, UA YELLOW YELLOW    Clarity, UA CLEAR CLEAR    Glucose, Urine NEGATIVE NEGATIVE MG/DL    Bilirubin Urine NEGATIVE NEGATIVE MG/DL    Ketones, Urine NEGATIVE NEGATIVE MG/DL    Specific Gravity, UA 1.010 1.001 - 1.035    Blood, Urine NEGATIVE NEGATIVE    pH, Urine 7.0 5.0 - 8.0    Protein, UA NEGATIVE NEGATIVE MG/DL    Urobilinogen, Urine 1.0 0.2 - 1.0 MG/DL    Nitrite Urine, Quantitative POSITIVE (A) NEGATIVE    Leukocyte Esterase, Urine MODERATE NUMBER OR AMOUNT OBSERVED (A) NEGATIVE   EKG 12 Lead   Result Value Ref Range    Ventricular Rate 73 BPM    Atrial Rate 73 BPM    P-R Interval 132 ms    QRS Duration 78 ms    Q-T Interval 416 ms    QTc Calculation (Bazett) 458 ms    P Axis -11 degrees    R Axis 46 degrees    T Axis 23 degrees    Diagnosis       Normal sinus rhythm  Normal ECG  When compared with ECG of 04-OCT-2022 12:46,  No significant change was found               IMAGING:   CT HEAD WO CONTRAST   Final Result   No acute intracranial abnormality. XR CHEST PORTABLE   Final Result   Redemonstration of large right-sided pleural effusion and opacity of the   right lung, appearing grossly similar to the prior study.                          ED COURSE & MEDICAL DECISION MAKING    82-year-old male with history of chronic kidney disease, cirrhosis with ascites, esophageal varices, and DM presents the emergency department with complaints of altered mental status. He was brought to room and placed on cardiac monitor and continuous pulse oximeter. An IV was established and blood work sent to lab. EKG obtained. Chest x-ray and CT of the head without contrast obtained. CT of the head without contrast showed no acute findings. Chest x-ray showed redemonstration of large right-sided pleural effusion and opacity of the right lung, appearing grossly similar to the prior study. WBC within normal limits. CBC does show anemia which looks improved from previous values. CMP shows a rate of 97, creatinine of 1.9, and AST of 40. Ammonia is mildly elevated at 66. Lactate is 1.3. Blood cultures are pending. Urinalysis is positive for nitrates and leukocyte esterase. A urine culture is pending. He was medicated with ceftriaxone 1 g IV. A of the head and neck was ordered, however GFR is less than 40. Patient remains confused. At this time, I do feel he needs to be admitted to the hospitalist for further evaluation and treatment. Casey Mcnamara NP with hospitalist service consulted for admission. Clinical  IMPRESSION    1. Altered mental status, unspecified altered mental status type    2. Hepatic cirrhosis, unspecified hepatic cirrhosis type, unspecified whether ascites present ([address]    3. Acute cystitis without hematuria    4. CANDIE (acute kidney injury) ([address]                Comment: Please note this report has been produced using speech recognition software and may contain errors related to that system including errors in grammar, punctuation, and spelling, as well as words and phrases that may be inappropriate. If there are any questions or concerns please feel free to contact the dictating provider for clarification.               Nguyen Romero, MOHINDER - CNP  11/12/22 1008

## 2022-11-12 NOTE — ED PROVIDER NOTES
[address] ENCOUNTER      Supervisory Note:      Pt Name: Olivia Bueno  MRN: [id number]  [place] 1945  Date of evaluation: 11/12/2022  Provider: Joshua Camejo MD      This is a supervisory note. This patient was initially evaluated by the NP/PA. Please see their documentation for their full evaluation, impression and plan. CHIEF COMPLAINT       Chief Complaint   Patient presents with    Altered Mental Status         HISTORY OF PRESENT ILLNESS    Olivia Bueno is a 78 y.o. male who presents to the emergency department altered mental status and confusion along with increased abdominal swelling. He has a history of portal hypertension and liver failure. He has had prior episodes of hepatic encephalopathy    HPI    Nursing Notes were reviewed. REVIEW OF SYSTEMS       Review of Systems    Except as noted above the remainder of the review of systems was reviewed and negative. PAST MEDICAL HISTORY     Past Medical History:   Diagnosis Date    Anxiety     Cirrhosis, alcoholic ([facility] [address]     Diabetes mellitus ([facility] [address]     GERD (gastroesophageal reflux disease) 2000    GERD (gastroesophageal reflux disease)     Hyperlipidemia     Hypertension     Portal hypertension (HCC)     Pulmonary nodules     Sleep apnea     SOB (shortness of breath)     Thyroid disease          SURGICAL HISTORY       Past Surgical History:   Procedure Laterality Date    APPENDECTOMY      CHOLECYSTECTOMY      COLONOSCOPY      ENDOSCOPY, COLON, DIAGNOSTIC      FOOT SURGERY      needle removed,not sure which foot, per wife.     TONSILLECTOMY      UPPER GASTROINTESTINAL ENDOSCOPY N/A 1/19/2022    EGD BIOPSY performed by Alex Villarreal MD at [facility]       Previous Medications    ALBUTEROL SULFATE HFA (PROVENTIL;VENTOLIN;PROAIR) 108 (90 BASE) MCG/ACT INHALER    INHALE 2 PUFFS BY MOUTH 4 TIMES DAILY AS NEEDED FOR WHEEZING    BETAMETHASONE VALERATE (VALISONE) 0.1 % LOTION    Apply topically 2 times daily prn scalp rash    DULOXETINE (CYMBALTA) 60 MG EXTENDED RELEASE CAPSULE    TAKE 1 CAPSULE BY MOUTH ONCE DAILY    ESOMEPRAZOLE MAGNESIUM PO    Take 20 mg by mouth daily OTC    FUROSEMIDE (LASIX) 40 MG TABLET    Take 1 tablet by mouth in the morning and 1 tablet in the evening. LACTULOSE (CHRONULAC) 10 GM/15ML SOLUTION    Take 30 mLs by mouth 3 times daily Hold if having diarrhea    LEVOTHYROXINE (SYNTHROID) 50 MCG TABLET    Take 1 tablet by mouth Daily    MULTIPLE VITAMIN (MULTI VITAMIN MENS PO)    Take 1 tablet by mouth daily    NADOLOL (CORGARD) 40 MG TABLET    Take 1 tablet by mouth in the morning.     OMEGA-3 1000 MG CAPS    Take 2,000 mg by mouth daily    ONDANSETRON (ZOFRAN-ODT) 4 MG DISINTEGRATING TABLET    Take 1 tablet by mouth 3 times daily as needed for Nausea or Vomiting    RIFAXIMIN (XIFAXAN) 550 MG TABLET    Take 1 tablet by mouth 2 times daily    SPIRONOLACTONE (ALDACTONE) 100 MG TABLET    Take 100 mg by mouth 2 times daily       ALLERGIES     Lisinopril, Zetia [ezetimibe], Atorvastatin, Codeine, Hydrochlorothiazide, Hydroxyzine, Lexapro [escitalopram oxalate], and Pravastatin    FAMILY HISTORY       Family History   Problem Relation Age of Onset    Hypertension Brother     Diabetes Other           SOCIAL HISTORY       Social History     Socioeconomic History    Marital status:    Occupational History     Comment: Retired    Tobacco Use    Smoking status: Former     Packs/day: 1.00     Years: 14.00     Pack years: 14.00     Types: Cigarettes     Quit date:      Years since quittin.8    Smokeless tobacco: Never   Vaping Use    Vaping Use: Never used   Substance and Sexual Activity    Alcohol use: Not Currently    Drug use: Never    Sexual activity: Yes     Partners: Female     Social Determinants of Health     Financial Resource Strain: Low Risk     Difficulty of Paying Living Expenses: Not hard at all   Food Insecurity: No Food Insecurity    Worried About [address] in the Last Year: Never true    Ran Out of Food in the Last Year: Never true       SCREENINGS         [place] Coma Scale  Eye Opening: Spontaneous  Best Verbal Response: Oriented  Best Motor Response: Obeys commands  [place] Coma Scale Score: 15                     CIWA Assessment  BP: 127/80  Heart Rate: 67                 PHYSICAL EXAM    (up to 7 for level 4, 8 or more for level 5)     ED Triage Vitals   BP Temp Temp Source Heart Rate Resp SpO2 Height Weight   11/12/22 1226 11/12/22 1224 11/12/22 1224 11/12/22 1224 11/12/22 1224 11/12/22 1224 11/12/22 1235 11/12/22 1224   130/63 97.9 °F (36.6 °C) Oral 76 18 96 % 5' 8\" (1.727 m) 221 lb (100.2 kg)       Physical Exam  Vitals reviewed. HENT:      Head: Normocephalic and atraumatic. Eyes:      Extraocular Movements: Extraocular movements intact. Cardiovascular:      Rate and Rhythm: Normal rate. Pulmonary:      Effort: Pulmonary effort is normal.   Abdominal:      General: There is distension. Palpations: Abdomen is soft. There is no mass. Tenderness: There is no abdominal tenderness. There is no guarding. Skin:     General: Skin is warm and dry. Neurological:      Mental Status: He is lethargic, disoriented and confused. GCS: GCS eye subscore is 3. GCS verbal subscore is 4. GCS motor subscore is 6. DIAGNOSTIC RESULTS     EKG: All EKG's are interpreted by the Emergency Department Physician who either signs or Co-signs this chart in the absence of a cardiologist.    Normal sinus rhythm. Ventricular to 73. AL is 132. QRS is 70. QTc is 458. There are no abnormal ST elevations or depressions. There is no ectopy.   There is no previous EKG available for comparison at this time    RADIOLOGY:   Non-plain film images such as CT, Ultrasound and MRI are read by the radiologist. Plain radiographic images are visualized and preliminarily interpreted by the emergency physician with the below findings:    Interpretation per the Radiologist below, if available at the time of this note:    CT HEAD WO CONTRAST   Final Result   No acute intracranial abnormality. XR CHEST PORTABLE   Final Result   Redemonstration of large right-sided pleural effusion and opacity of the   right lung, appearing grossly similar to the prior study. LABS:  Labs Reviewed   CBC WITH AUTO DIFFERENTIAL - Abnormal; Notable for the following components:       Result Value    RBC 3.72 (*)     Hemoglobin 12.4 (*)     Hematocrit 37.9 (*)     .9 (*)     MCH 33.3 (*)     RDW 15.4 (*)     Segs Relative 75.8 (*)     Lymphocytes % 8.6 (*)     Monocytes % 11.2 (*)     Eosinophils % 3.6 (*)     All other components within normal limits   COMPREHENSIVE METABOLIC PANEL - Abnormal; Notable for the following components:    Chloride 97 (*)     BUN 35 (*)     Creatinine 1.9 (*)     Est, Glom Filt Rate 37 (*)     Glucose 112 (*)     Albumin 3.3 (*)     AST 40 (*)     All other components within normal limits   AMMONIA - Abnormal; Notable for the following components:    Ammonia 66 (*)     All other components within normal limits   CULTURE, BLOOD 1   CULTURE, BLOOD 2   LACTATE, SEPSIS   LACTATE, SEPSIS       All other labs were within normal range or not returned as of this dictation. EMERGENCY DEPARTMENT COURSE and DIFFERENTIAL DIAGNOSIS/MDM:   Vitals:    Vitals:    11/12/22 1403 11/12/22 1433 11/12/22 1502 11/12/22 1532   BP: 119/64 (!) 96/57 139/74 127/80   Pulse: 70 67 67 67   Resp: 14 13 13 13   Temp:       TempSrc:       SpO2: 95% 95%     Weight:       Height:             MDM  Etiology of the patient's altered mental status remains unclear. The possibility of hepatic encephalitis remains a consideration however patient's ammonia is only 66.   He will be admitted to the hospital for further evaluation by medicine. CONSULTS:  None    PROCEDURES:  Unless otherwise noted below, none     Procedures      FINAL IMPRESSION      1. Altered mental status, unspecified altered mental status type          DISPOSITION/PLAN   DISPOSITION Decision To Admit 11/12/2022 04:16:43 PM      PATIENT REFERRED TO:  No follow-up provider specified. DISCHARGE MEDICATIONS:  New Prescriptions    No medications on file     Controlled Substances Monitoring:     No flowsheet data found.     Bigg Mccoy MD (electronically signed)  Attending Emergency Physician           Bigg Mccoy MD  11/12/22 2829

## 2022-11-12 NOTE — ED NOTES
ED TO INPATIENT SBAR HANDOFF    Patient Name: Анна Temple   :  1945  78 y.o. MRN:  [id number]  Preferred Name  Gladis Bain  ED Room #:  ED21/ED-21  Family/Caregiver Present no   Restraints no   Sitter no   Sepsis Risk Score Sepsis Risk Score: 2.34    Situation  Code Status: Prior No additional code details. Allergies: Lisinopril, Zetia [ezetimibe], Atorvastatin, Codeine, Hydrochlorothiazide, Hydroxyzine, Lexapro [escitalopram oxalate], and Pravastatin  Weight: Patient Vitals for the past 96 hrs (Last 3 readings):   Weight   22 1235 242 lb (109.8 kg)   22 1224 221 lb (100.2 kg)     Arrived from: home  Chief Complaint:   Chief Complaint   Patient presents with    Altered Mental [place] Problem/Diagnosis:  Active Problems:    * No active hospital problems. *  Resolved Problems:    * No resolved hospital problems. *    Imaging:   CT HEAD WO CONTRAST   Final Result   No acute intracranial abnormality. XR CHEST PORTABLE   Final Result   Redemonstration of large right-sided pleural effusion and opacity of the   right lung, appearing grossly similar to the prior study.            Abnormal labs:   Abnormal Labs Reviewed   CBC WITH AUTO DIFFERENTIAL - Abnormal; Notable for the following components:       Result Value    RBC 3.72 (*)     Hemoglobin 12.4 (*)     Hematocrit 37.9 (*)     .9 (*)     MCH 33.3 (*)     RDW 15.4 (*)     Segs Relative 75.8 (*)     Lymphocytes % 8.6 (*)     Monocytes % 11.2 (*)     Eosinophils % 3.6 (*)     All other components within normal limits   COMPREHENSIVE METABOLIC PANEL - Abnormal; Notable for the following components:    Chloride 97 (*)     BUN 35 (*)     Creatinine 1.9 (*)     Est, Glom Filt Rate 37 (*)     Glucose 112 (*)     Albumin 3.3 (*)     AST 40 (*)     All other components within normal limits   AMMONIA - Abnormal; Notable for the following components:    Ammonia 66 (*)     All other components within normal limits     Critical values: no     Abnormal Assessment Findings: ammonia 66    Background  History:   Past Medical History:   Diagnosis Date    Anxiety     Cirrhosis, alcoholic (HCC)     Diabetes mellitus ([facility] [address]     GERD (gastroesophageal reflux disease) 2000    GERD (gastroesophageal reflux disease)     Hyperlipidemia     Hypertension     Portal hypertension ([facility]     Pulmonary nodules     Sleep apnea     SOB (shortness of breath)     Thyroid disease        Assessment    Vitals/MEWS:    Level of Consciousness: Alert (0)   Vitals:    11/12/22 1403 11/12/22 1433 11/12/22 1502 11/12/22 1532   BP: 119/64 (!) 96/57 139/74 127/80   Pulse: 70 67 67 67   Resp: 14 13 13 13   Temp:       TempSrc:       SpO2: 95% 95%     Weight:       Height:         FiO2 (%): n/a  O2 Flow Rate: O2 Device: None (Room air)    Cardiac Rhythm:    Pain Assessment:  [] Verbal [x] Edgar Randolph Scale  Pain Scale: Pain Assessment  Pain Assessment: 0-10  Pain Level: 0  Patient's Stated Pain Goal: 0 - No pain  Last documented pain score (0-10 scale) Pain Level: 0  Last documented pain medication administered: none  Mental Status: alert  NIH Score:    C-SSRS: Risk of Suicide: No Risk  Bedside swallow:    [place] Coma Scale (GCS): Hali Coma Scale  Eye Opening: Spontaneous  Best Verbal Response: Oriented  Best Motor Response: Obeys commands  Hali Coma Scale Score: 15  Active LDA's:   Peripheral IV 11/12/22 Distal;Right Cephalic (Active)     PO Status: Regular  Pertinent or High Risk Medications/Drips: no   If Yes, please provide details: n/a    Pending Blood Product Administration: no     You may also review the ED PT Care Timeline found under the Summary Nursing Index tab. Recommendation    Pending orders lactulose  Plan for Discharge (if known): Additional Comments: per patients wife patient was disoriented, was able to answer all questions appropriately for me. Very hard of hearing. Paracentesis done yesterday.    If any further questions, please call Sending RN at [phone]    Electronically signed by: Electronically signed by Kurt Whitley RN on 11/12/2022 at 4:29 PM      Kurt Whitley RN  11/12/22 [id number]

## 2022-11-12 NOTE — H&P
V2.0  History and Physical      Name:  Shannan Johnson /Age/Sex: 1945  (78 y.o. male)   MRN & CSN:  [id number] & [id number] Encounter Date/Time: 2022 6:49 PM EST   Location:  ED21/ED-21 PCP: Guillermo Cheney Day: 1    Assessment and Plan:   Shannan Johnson is a 78 y.o. male with a pmh of liver cirrhosis, recurrent ascites, who presents with AMS (altered mental status)    Hospital Problems             Last Modified POA    * (Principal) AMS (altered mental status) 2022 Yes     Altered mental status  Acute uncomplicated urinary tract infection  Hepatic encephalopathy                Admit to inpatient, MedSurg  Likely hepatic encephalopathy worsened by UTI  Ammonia level 66, slightly elevated above baseline, increase lactulose to 30 g 3 times daily  Continue home Haldol, rifaximin  Continue home Lasix, spironolactone   Neurochecks every 4 hours             -Check VBG, urine drug screen, UA, urine culture  CT head negative for acute intercranial abnormalities.   Unable to complete CTA due to GFR            UA with moderate leukocytes, positive for nitrates   White count 7.5, afebrile, hemodynamically stable   Initiated on Rocephin in ED, continue   Falls precautions, hold all sedating meds,   MRI brain without contrast pending   Consult neurology, Dr. Davis Kay, appreciate assistance    Recurrent ascites  History of recurrent right-sided pleural effusion  -Multiple prior paracenteses, multiple readmissions   Status post paracentesis yesterday  - continue home diuretics  -Consult to GI, Dr. Nettie Greenwood     Alcoholic liver cirrhosis  Portal hypertension  History of esophageal varices  History of hepatic encephalopathy  -Continue nadolol, spironolactone and Lasix  -Continue rifaximin, lactulose  -follows with Dr. Nettie Greenwood, followed on recent admission and deemed not a candidate for TIPS or transplant  Consult to Dr. Nettie Greenwood with GI  CKDIII  -baseline Cr ~1.2-1.3, remains near baseline  - monitor creatinine        Hypertension   -BP stable, occasionally soft   - continue home nadalol, Aldactone, Lasix with hold parameters     Hypothyroidism   - continue home synthroid      History of alcohol abuse   - no longer drinking, patient reports 204 days since last drink    Disposition:   Current Living situation: Home with wife  Expected Disposition: Likely same  Estimated D/C: 4 days    Diet No diet orders on file   DVT Prophylaxis [x] Lovenox, []  Heparin, [] SCDs, [] Ambulation,  [] Eliquis, [] Xarelto, [] Coumadin   Code Status Prior   Surrogate Decision Maker/ POA      History from:     spouse, electronic medical record, reason patient could not give history:  altered mental status    History of Present Illness:     Chief Complaint: Altered mental status  Dagoberto White is a 78 y.o. male with pmh of chronic kidney disease, cirrhosis of the liver with ascites, esophageal varices, who presents via ambulance with complaints of altered mental status. Most information is Obtained from the wife. Patient's wife states he woke up this morning feeling very confused. He went to make a cup of coffee and did not know how to. States yesterday he did have a paracentesis at the hospital.  Denies any recent fevers or chills. Denies any pain. Denies any alleviation or exacerbation of his symptoms. Came to bedside and states the patient woke up at approximately 430 this morning. She states that 830 he became confused and started unplugging everything in the house. She states he then put on his shirt and jacket, however did not put on pants. She states his speech is baseline for him. She states she has noticed his face has been flushed all day.      Review of Systems: Need 10 Elements   Review of Systems   Unable to perform ROS: Mental status change          Objective:   No intake or output data in the 24 hours ending 11/12/22 1849   Vitals:   Vitals:    11/12/22 1433 11/12/22 1502 11/12/22 1532 Historical Provider, MD       Physical Exam: Need 8 Elements   Physical Exam     General: NAD  Eyes: EOMI  ENT: neck supple  Cardiovascular: Regular rate. Respiratory: Clear to auscultation  Gastrointestinal: Soft, non tender  Genitourinary: no suprapubic tenderness  Musculoskeletal: No edema  Skin: warm, dry  Neuro: Somnolent, arouses to stimuli, pupils equal round and reactive to light and accommodation. Cranial nerves II through XII grossly intact. Sensation intact to light touch. Strength 5 out of 5 in upper and lower extremities bilaterally. Normal heel-to-shin bilaterally. No pronator drift. No truncal ataxia. NIH stroke scale 0  Psych: Mood appropriate. Past Medical History:   PMHx   Past Medical History:   Diagnosis Date    Anxiety     Cirrhosis, alcoholic ([facility] [address]     Diabetes mellitus ([facility] [address]     GERD (gastroesophageal reflux disease) 2000    GERD (gastroesophageal reflux disease)     Hyperlipidemia     Hypertension     Portal hypertension (HCC)     Pulmonary nodules     Sleep apnea     SOB (shortness of breath)     Thyroid disease      PSHX:  has a past surgical history that includes Cholecystectomy; Vasectomy; Colonoscopy; Endoscopy, colon, diagnostic; Foot surgery; Upper gastrointestinal endoscopy (N/A, 1/19/2022); Tonsillectomy; and Appendectomy. Allergies: Allergies   Allergen Reactions    Lisinopril Swelling     Tongue swelling      Zetia [Ezetimibe] Swelling     Facial swelling    Atorvastatin     Codeine Other (See Comments)     Blood pressure drops    Hydrochlorothiazide     Hydroxyzine      Fatigue and depressed    Lexapro [Escitalopram Oxalate]      Increased depression    Pravastatin      Fam HX:  family history includes Diabetes in an other family member; Hypertension in his brother.   Soc HX:   Social History     Socioeconomic History    Marital status:    Occupational History     Comment: Retired    Tobacco Use    Smoking status: Former     Packs/day: 1.00 Years: 14.00     Pack years: 14.00     Types: Cigarettes     Quit date: 12     Years since quittin.8    Smokeless tobacco: Never   Vaping Use    Vaping Use: Never used   Substance and Sexual Activity    Alcohol use: Not Currently    Drug use: Never    Sexual activity: Yes     Partners: Female     Social Determinants of Health     Financial Resource Strain: Low Risk     Difficulty of Paying Living Expenses: Not hard at all   Food Insecurity: No Food Insecurity    Worried About Running Out of Food in the Last Year: Never true    Ran Out of Food in the Last Year: Never true       Medications:   Medications:    cefTRIAXone (ROCEPHIN) IV  1,000 mg IntraVENous Once      Infusions:   PRN Meds:      Labs      CBC:   Recent Labs     11/10/22  1110 22  1420   WBC 6.6 7.5   HGB 11.7* 12.4*    184     BMP:    Recent Labs     22  1420      K 4.5   CL 97*   CO2 29   BUN 35*   CREATININE 1.9*   GLUCOSE 112*     Hepatic:   Recent Labs     22  1420   AST 40*   ALT 23   BILITOT 0.9   ALKPHOS 96     Lipids:   Lab Results   Component Value Date/Time    CHOL 174 2022 03:25 AM    CHOL 230 10/14/2019 01:45 PM    HDL 28 2022 03:25 AM    TRIG 130 2022 03:25 AM     Hemoglobin A1C:   Lab Results   Component Value Date/Time    LABA1C 5.5 2022 10:23 AM     TSH:   Lab Results   Component Value Date/Time    TSH 1.39 10/14/2019 01:45 PM     Troponin:   Lab Results   Component Value Date/Time    TROPONINT <0.010 10/04/2022 11:11 AM    TROPONINT 0.017 2022 04:25 PM    TROPONINT 0.027 2022 08:27 PM     Lactic Acid: No results for input(s): LACTA in the last 72 hours. BNP: No results for input(s): PROBNP in the last 72 hours.   UA:  Lab Results   Component Value Date/Time    NITRU POSITIVE 2022 05:36 PM    COLORU YELLOW 2022 05:36 PM    WBCUA NONE SEEN 2022 12:10 PM    RBCUA 1 2022 12:10 PM    MUCUS RARE 2022 12:10 PM    TRICHOMONAS NONE SEEN 06/22/2022 12:10 PM    BACTERIA NEGATIVE 06/22/2022 12:10 PM    CLARITYU CLEAR 11/12/2022 05:36 PM    SPECGRAV 1.010 11/12/2022 05:36 PM    LEUKOCYTESUR MODERATE NUMBER OR AMOUNT OBSERVED 11/12/2022 05:36 PM    UROBILINOGEN 1.0 11/12/2022 05:36 PM    BILIRUBINUR NEGATIVE 11/12/2022 05:36 PM    BLOODU NEGATIVE 11/12/2022 05:36 PM    KETUA NEGATIVE 11/12/2022 05:36 PM     Urine Cultures: No results found for: LABURIN  Blood Cultures: No results found for: BC  No results found for: BLOODCULT2  Organism: No results found for: ORG    Imaging/Diagnostics Last 24 Hours   CT HEAD WO CONTRAST    Result Date: 11/12/2022  EXAMINATION: CT OF THE HEAD WITHOUT CONTRAST  11/12/2022 1:46 pm TECHNIQUE: CT of the head was performed without the administration of intravenous contrast. Automated exposure control, iterative reconstruction, and/or weight based adjustment of the mA/kV was utilized to reduce the radiation dose to as low as reasonably achievable. COMPARISON: 05/10/2022 HISTORY: ORDERING SYSTEM PROVIDED HISTORY: ams TECHNOLOGIST PROVIDED HISTORY: Reason for exam:->ams Has a \"code stroke\" or \"stroke alert\" been called? ->No Decision Support Exception - unselect if not a suspected or confirmed emergency medical condition->Emergency Medical Condition (MA) Reason for Exam: AMS FINDINGS: BRAIN/VENTRICLES: The ventricles and sulci are diffusely enlarged. Low attenuation is seen in the periventricular and subcortical white matter. No acute intracranial hemorrhage or acute infarct is identified. ORBITS: The visualized portion of the orbits demonstrate no acute abnormality. SINUSES: The visualized paranasal sinuses and mastoid air cells demonstrate no acute abnormality. SOFT TISSUES/SKULL:  No acute abnormality of the visualized skull or soft tissues. No acute intracranial abnormality. XR CHEST PORTABLE    Result Date: 11/12/2022  EXAMINATION: ONE XRAY VIEW OF THE CHEST 11/12/2022 12:57 pm COMPARISON: 11/10/2022.  HISTORY: ORDERING SYSTEM PROVIDED HISTORY: ams TECHNOLOGIST PROVIDED HISTORY: Reason for exam:->ams Reason for Exam: ams FINDINGS: The cardiac silhouette is indeterminate. There is redemonstration large right-sided pleural effusion with opacity of the right lung with relative sparing of right lung apex. The left lung appears clear. Redemonstration of large right-sided pleural effusion and opacity of the right lung, appearing grossly similar to the prior study. XR CHEST PORTABLE    Result Date: 11/10/2022  EXAMINATION: ONE XRAY VIEW OF THE CHEST 11/10/2022 1:00 pm COMPARISON: Chest x-ray October 26, 2022 HISTORY: ORDERING SYSTEM PROVIDED HISTORY: s/p thoracentesis TECHNOLOGIST PROVIDED HISTORY: Reason for exam:->s/p thoracentesis Reason for Exam: s/p thoracentesis Additional signs and symptoms: s/p thoracentesis Relevant Medical/Surgical History: s/p thoracentesis FINDINGS: The cardiac silhouette is grossly stable. The patient is rotated to the right. Large right effusion and airspace disease redemonstrated with slightly improved aeration of the right lung when compared with previous exam.  Left lung is clear. No pneumothorax identified. 1. Redemonstration of large right effusion and airspace disease with slightly improved aeration of the right lung when compared with previous exam. 2. No pneumothorax identified. IR US GUIDED PARACENTESIS    Result Date: 11/11/2022  PROCEDURE: PARACENTESIS WITHOUT IMAGE GUIDANCE US ABDOMEN LIMITED 11/11/2022 HISTORY: ORDERING SYSTEM PROVIDED HISTORY: Other cirrhosis of liver (HCC) TECHNIQUE: Maximum sterile barrier technique including hand hygiene, skin prep and sterile ultrasound technique utilized for procedure. Sterile ultrasound technique also utilized for procedure Ultrasound guidance required for procedure to confirm presence of ascites, puncture site selection, real-time intra procedural guidance. Images made for patient's medical file. .  Informed consent was obtained after a detailed explanation of the procedure including risks, benefits, and alternatives. Universal protocol was followed. A limited ultrasound of the abdomen was performed. The right abdomen was prepped and draped in sterile fashion and local anesthesia was achieved with lidocaine. An 6 Sinhala needle sheath was advanced into ascites and paracentesis was performed. The patient tolerated the procedure well. FINDINGS: Limited ultrasound of the abdomen demonstrates ascites with paracentesis catheter within it. A total of 5600 mL cloudy white ascitic fluid removed. 37.5 g intravenous albumin utilized for procedure. Successful ultrasound-guided paracentesis. IR GUIDED THORACENTESIS PLEURAL    Result Date: 11/10/2022  PROCEDURE: ULTRASOUNDGUIDED RIGHT THORACENTESIS 11/10/2022 HISTORY: ORDERING SYSTEM PROVIDED HISTORY: Pleural effusion TECHNOLOGIST PROVIDED HISTORY: Which side should the procedure be performed?->Right TECHNIQUE: Maximum sterile barrier technique including hand hygiene, skin prep and sterile ultrasound technique utilized for procedure. Sterile ultrasound technique also utilized for procedure Ultrasound guidance required to confirm presence of pleural fluid, puncture site selection, real-time intra procedural guidance. Images made for patient's medical file. Informed consent was obtained after a detailed explanation of the procedure including risks, benefits, and alternatives. Universal protocol was performed. The right chest was prepped and draped in sterile fashion and local anesthesia was achieved with lidocaine. An 5 Sinhala needle sheath was advanced under ultrasound guidance into pleural effusion and thoracentesis was performed. The patient tolerated the procedure well. FINDINGS: Pre and intraprocedural images demonstrate large right pleural with paracentesis catheter within it. A total of 2000 mL yellow/white creamy fluid was removed from right pleural space.   Postprocedure chest radiograph ordered. No complication suggested. Successful ultrasound guided right thoracentesis.        Personally reviewed Lab Studies, Imaging, and discussed case with Dr Teresita Salazar    Electronically signed by MOHINDER Bhat CNP on 11/12/2022 at 6:49 PM

## 2022-11-12 NOTE — ED NOTES
Patient arrived via EMS from home with c/o altered mental status. Wife stated to EMS Patient woke up at 430 this morning and something was off. Patient is confused. Patient had a paracentesis two days ago and hasn't been right since. Patient is hard of hearing. Per medics, vital signs stable, negative on NIH, blood glucose 132. Patients respirations equal and unlabored, skin warm and dry.      Ankit Bach RN  11/12/22 [id number]

## 2022-11-13 ENCOUNTER — APPOINTMENT (OUTPATIENT)
Dept: ULTRASOUND IMAGING | Age: 77
DRG: 441 | End: 2022-11-13
Payer: MEDICARE

## 2022-11-13 ENCOUNTER — APPOINTMENT (OUTPATIENT)
Dept: MRI IMAGING | Age: 77
DRG: 441 | End: 2022-11-13
Payer: MEDICARE

## 2022-11-13 LAB
ALBUMIN SERPL-MCNC: 3.2 GM/DL (ref 3.4–5)
ALP BLD-CCNC: 101 IU/L (ref 40–128)
ALT SERPL-CCNC: 24 U/L (ref 10–40)
AMMONIA: 83 UMOL/L (ref 16–60)
AMPHETAMINES: NEGATIVE
ANION GAP SERPL CALCULATED.3IONS-SCNC: 10 MMOL/L (ref 4–16)
APTT: 36.5 SECONDS (ref 25.1–37.1)
AST SERPL-CCNC: 40 IU/L (ref 15–37)
BARBITURATE SCREEN URINE: NEGATIVE
BASOPHILS ABSOLUTE: 0.1 K/CU MM
BASOPHILS RELATIVE PERCENT: 0.8 % (ref 0–1)
BENZODIAZEPINE SCREEN, URINE: NEGATIVE
BILIRUB SERPL-MCNC: 0.9 MG/DL (ref 0–1)
BUN BLDV-MCNC: 32 MG/DL (ref 6–23)
CALCIUM SERPL-MCNC: 10 MG/DL (ref 8.3–10.6)
CANNABINOID SCREEN URINE: NEGATIVE
CHLORIDE BLD-SCNC: 99 MMOL/L (ref 99–110)
CO2: 31 MMOL/L (ref 21–32)
COCAINE METABOLITE: NEGATIVE
CREAT SERPL-MCNC: 1.7 MG/DL (ref 0.9–1.3)
DIFFERENTIAL TYPE: ABNORMAL
EOSINOPHILS ABSOLUTE: 0.4 K/CU MM
EOSINOPHILS RELATIVE PERCENT: 5.8 % (ref 0–3)
GFR SERPL CREATININE-BSD FRML MDRD: 42 ML/MIN/1.73M2
GLUCOSE BLD-MCNC: 116 MG/DL (ref 70–99)
HCT VFR BLD CALC: 38 % (ref 42–52)
HEMOGLOBIN: 12.3 GM/DL (ref 13.5–18)
IMMATURE NEUTROPHIL %: 0.3 % (ref 0–0.43)
INR BLD: 1.01 INDEX
LYMPHOCYTES ABSOLUTE: 0.9 K/CU MM
LYMPHOCYTES RELATIVE PERCENT: 12.4 % (ref 24–44)
MCH RBC QN AUTO: 33 PG (ref 27–31)
MCHC RBC AUTO-ENTMCNC: 32.4 % (ref 32–36)
MCV RBC AUTO: 101.9 FL (ref 78–100)
MONOCYTES ABSOLUTE: 0.9 K/CU MM
MONOCYTES RELATIVE PERCENT: 12.6 % (ref 0–4)
NUCLEATED RBC %: 0 %
OPIATES, URINE: NEGATIVE
OXYCODONE: NEGATIVE
PDW BLD-RTO: 15.3 % (ref 11.7–14.9)
PHENCYCLIDINE, URINE: NEGATIVE
PLATELET # BLD: 182 K/CU MM (ref 140–440)
PMV BLD AUTO: 10.4 FL (ref 7.5–11.1)
POTASSIUM SERPL-SCNC: 4.2 MMOL/L (ref 3.5–5.1)
PROTHROMBIN TIME: 13 SECONDS (ref 11.7–14.5)
RBC # BLD: 3.73 M/CU MM (ref 4.6–6.2)
SEGMENTED NEUTROPHILS ABSOLUTE COUNT: 4.9 K/CU MM
SEGMENTED NEUTROPHILS RELATIVE PERCENT: 68.1 % (ref 36–66)
SODIUM BLD-SCNC: 140 MMOL/L (ref 135–145)
T4 FREE: 0.98 NG/DL (ref 0.9–1.8)
TOTAL IMMATURE NEUTOROPHIL: 0.02 K/CU MM
TOTAL NUCLEATED RBC: 0 K/CU MM
TOTAL PROTEIN: 6.6 GM/DL (ref 6.4–8.2)
TSH HIGH SENSITIVITY: 0.53 UIU/ML (ref 0.27–4.2)
WBC # BLD: 7.2 K/CU MM (ref 4–10.5)

## 2022-11-13 PROCEDURE — 94761 N-INVAS EAR/PLS OXIMETRY MLT: CPT

## 2022-11-13 PROCEDURE — 6370000000 HC RX 637 (ALT 250 FOR IP): Performed by: NURSE PRACTITIONER

## 2022-11-13 PROCEDURE — 84443 ASSAY THYROID STIM HORMONE: CPT

## 2022-11-13 PROCEDURE — 93880 EXTRACRANIAL BILAT STUDY: CPT

## 2022-11-13 PROCEDURE — P9047 ALBUMIN (HUMAN), 25%, 50ML: HCPCS | Performed by: INTERNAL MEDICINE

## 2022-11-13 PROCEDURE — 1200000000 HC SEMI PRIVATE

## 2022-11-13 PROCEDURE — 2580000003 HC RX 258: Performed by: NURSE PRACTITIONER

## 2022-11-13 PROCEDURE — 82140 ASSAY OF AMMONIA: CPT

## 2022-11-13 PROCEDURE — 94640 AIRWAY INHALATION TREATMENT: CPT

## 2022-11-13 PROCEDURE — 76937 US GUIDE VASCULAR ACCESS: CPT

## 2022-11-13 PROCEDURE — 84439 ASSAY OF FREE THYROXINE: CPT

## 2022-11-13 PROCEDURE — 85025 COMPLETE CBC W/AUTO DIFF WBC: CPT

## 2022-11-13 PROCEDURE — 80307 DRUG TEST PRSMV CHEM ANLYZR: CPT

## 2022-11-13 PROCEDURE — 36415 COLL VENOUS BLD VENIPUNCTURE: CPT

## 2022-11-13 PROCEDURE — 85610 PROTHROMBIN TIME: CPT

## 2022-11-13 PROCEDURE — 70551 MRI BRAIN STEM W/O DYE: CPT

## 2022-11-13 PROCEDURE — 85730 THROMBOPLASTIN TIME PARTIAL: CPT

## 2022-11-13 PROCEDURE — 6360000002 HC RX W HCPCS: Performed by: NURSE PRACTITIONER

## 2022-11-13 PROCEDURE — 6360000002 HC RX W HCPCS: Performed by: INTERNAL MEDICINE

## 2022-11-13 PROCEDURE — 80053 COMPREHEN METABOLIC PANEL: CPT

## 2022-11-13 PROCEDURE — 99222 1ST HOSP IP/OBS MODERATE 55: CPT | Performed by: STUDENT IN AN ORGANIZED HEALTH CARE EDUCATION/TRAINING PROGRAM

## 2022-11-13 RX ORDER — OCTREOTIDE ACETATE 100 UG/ML
100 INJECTION, SOLUTION INTRAVENOUS; SUBCUTANEOUS EVERY 8 HOURS
Status: COMPLETED | OUTPATIENT
Start: 2022-11-13 | End: 2022-11-14

## 2022-11-13 RX ORDER — ASPIRIN 81 MG/1
81 TABLET, CHEWABLE ORAL DAILY
Status: DISCONTINUED | OUTPATIENT
Start: 2022-11-13 | End: 2022-11-15 | Stop reason: HOSPADM

## 2022-11-13 RX ORDER — ALBUMIN (HUMAN) 12.5 G/50ML
12.5 SOLUTION INTRAVENOUS ONCE
Status: COMPLETED | OUTPATIENT
Start: 2022-11-13 | End: 2022-11-13

## 2022-11-13 RX ORDER — MIDODRINE HYDROCHLORIDE 5 MG/1
5 TABLET ORAL
Status: DISCONTINUED | OUTPATIENT
Start: 2022-11-13 | End: 2022-11-15 | Stop reason: HOSPADM

## 2022-11-13 RX ADMIN — RIFAXIMIN 400 MG: 200 TABLET ORAL at 21:24

## 2022-11-13 RX ADMIN — CEFTRIAXONE SODIUM 1000 MG: 1 INJECTION, POWDER, FOR SOLUTION INTRAMUSCULAR; INTRAVENOUS at 12:36

## 2022-11-13 RX ADMIN — RIFAXIMIN 400 MG: 200 TABLET ORAL at 15:29

## 2022-11-13 RX ADMIN — ALBUTEROL SULFATE 2 PUFF: 90 AEROSOL, METERED RESPIRATORY (INHALATION) at 07:55

## 2022-11-13 RX ADMIN — SPIRONOLACTONE 100 MG: 50 TABLET ORAL at 09:11

## 2022-11-13 RX ADMIN — OCTREOTIDE ACETATE 100 MCG: 100 INJECTION, SOLUTION INTRAVENOUS; SUBCUTANEOUS at 22:44

## 2022-11-13 RX ADMIN — SODIUM CHLORIDE, PRESERVATIVE FREE 10 ML: 5 INJECTION INTRAVENOUS at 09:12

## 2022-11-13 RX ADMIN — ALBUMIN (HUMAN) 12.5 G: 0.25 INJECTION, SOLUTION INTRAVENOUS at 15:43

## 2022-11-13 RX ADMIN — ALBUTEROL SULFATE 2 PUFF: 90 AEROSOL, METERED RESPIRATORY (INHALATION) at 19:02

## 2022-11-13 RX ADMIN — LACTULOSE 30 G: 10 SOLUTION ORAL at 12:09

## 2022-11-13 RX ADMIN — FUROSEMIDE 40 MG: 40 TABLET ORAL at 17:27

## 2022-11-13 RX ADMIN — PANTOPRAZOLE SODIUM 40 MG: 40 TABLET, DELAYED RELEASE ORAL at 05:52

## 2022-11-13 RX ADMIN — OMEGA-3-ACID ETHYL ESTERS 2 G: 1 CAPSULE, LIQUID FILLED ORAL at 09:11

## 2022-11-13 RX ADMIN — DULOXETINE HYDROCHLORIDE 60 MG: 30 CAPSULE, DELAYED RELEASE ORAL at 09:11

## 2022-11-13 RX ADMIN — LEVOTHYROXINE SODIUM 50 MCG: 0.05 TABLET ORAL at 05:52

## 2022-11-13 RX ADMIN — LACTULOSE 30 G: 10 SOLUTION ORAL at 09:11

## 2022-11-13 RX ADMIN — LACTULOSE 30 G: 10 SOLUTION ORAL at 21:22

## 2022-11-13 RX ADMIN — OCTREOTIDE ACETATE 100 MCG: 100 INJECTION, SOLUTION INTRAVENOUS; SUBCUTANEOUS at 15:29

## 2022-11-13 RX ADMIN — RIFAXIMIN 400 MG: 200 TABLET ORAL at 09:12

## 2022-11-13 RX ADMIN — ALBUTEROL SULFATE 2 PUFF: 90 AEROSOL, METERED RESPIRATORY (INHALATION) at 15:42

## 2022-11-13 RX ADMIN — SODIUM CHLORIDE, PRESERVATIVE FREE 10 ML: 5 INJECTION INTRAVENOUS at 21:27

## 2022-11-13 RX ADMIN — ALBUTEROL SULFATE 2 PUFF: 90 AEROSOL, METERED RESPIRATORY (INHALATION) at 11:47

## 2022-11-13 RX ADMIN — FUROSEMIDE 40 MG: 40 TABLET ORAL at 09:11

## 2022-11-13 RX ADMIN — ASPIRIN 81 MG CHEWABLE TABLET 81 MG: 81 TABLET CHEWABLE at 12:10

## 2022-11-13 RX ADMIN — ENOXAPARIN SODIUM 40 MG: 100 INJECTION SUBCUTANEOUS at 09:11

## 2022-11-13 NOTE — CONSULTS
[address] [place] [address], [address] [place] [address]  Phone: [phone]  Office Hours: 8:30AM - 4:30PM  Monday - Friday     Nephrology Service Consultation    Patient:  Yu Espino  MRN: [id number]  Consulting physician:  Jaren Felipe MD  Reason for Consult: CANDIE on CKD3    History Obtained From:  patient, electronic medical record  PCP: Madeline David MD    HISTORY OF PRESENT ILLNESS:   The patient is a 78 y.o. male who presents with AMS- was trying to make coffee  but did not know how to and forgot to add water. Has a history of ETOH liver disease but has been sober fo mor pipo 6 months. He does not remember much prior to hospitalization. Has been seeing my partner Dr Sheryl Busch for CKD - baseline creat 1.4. He states that [employer] urine -does not know how much\"    Past Medical History:        Diagnosis Date    Anxiety     Cirrhosis, alcoholic ([address]     Diabetes mellitus ([address]     GERD (gastroesophageal reflux disease) 2000    GERD (gastroesophageal reflux disease)     Hyperlipidemia     Hypertension     Portal hypertension (HCC)     Pulmonary nodules     Sleep apnea     SOB (shortness of breath)     Thyroid disease        Past Surgical History:        Procedure Laterality Date    APPENDECTOMY      CHOLECYSTECTOMY      COLONOSCOPY      ENDOSCOPY, COLON, DIAGNOSTIC      FOOT SURGERY      needle removed,not sure which foot, per wife.     TONSILLECTOMY      UPPER GASTROINTESTINAL ENDOSCOPY N/A 1/19/2022    EGD BIOPSY performed by Isa Rosenbaum MD at [address] [place] [address]         Medications:   Scheduled Meds:   aspirin  81 mg Oral Daily    albuterol sulfate HFA  2 puff Inhalation 4x daily    DULoxetine  60 mg Oral Daily    furosemide  40 mg Oral BID    levothyroxine  50 mcg Oral Daily    nadolol  40 mg Oral Nightly    spironolactone  100 mg Oral BID    sodium chloride flush  5-40 mL IntraVENous 2 times per day    cefTRIAXone (ROCEPHIN) IV  1,000 mg IntraVENous Q24H    lactulose  30 g Oral TID    enoxaparin  40 mg SubCUTAneous Daily    pantoprazole  40 mg Oral QAM AC    rifAXIMin  400 mg Oral TID    omega-3 acid ethyl esters  2 g Oral Daily     Continuous Infusions:   sodium chloride       PRN Meds:.sodium chloride flush, sodium chloride, ondansetron **OR** ondansetron, polyethylene glycol    Allergies:  Lisinopril, Zetia [ezetimibe], Atorvastatin, Codeine, Hydrochlorothiazide, Hydroxyzine, Lexapro [escitalopram oxalate], and Pravastatin    Social History:   TOBACCO:   reports that he quit smoking about 46 years ago. His smoking use included cigarettes. He has a 14.00 pack-year smoking history. He has never used smokeless tobacco.  ETOH:   reports that he does not currently use alcohol. OCCUPATION:      Family History:       Problem Relation Age of Onset    Hypertension Brother     Diabetes Other        REVIEW OF SYSTEMS:  Negative except for AMS. Physical Exam:    Vitals: /70   Pulse 71   Temp 97.6 °F (36.4 °C) (Oral)   Resp 16   Ht 5' 8\" (1.727 m)   Wt 218 lb 14.7 oz (99.3 kg)   SpO2 95%   BMI 33.29 kg/m²   General appearance: alert, appears stated age and cooperative  Skin: Skin color, texture, turgor normal. No rashes or lesions  HEENT: Head: Normocephalic, no lesions, without obvious abnormality. Neck: no adenopathy, no carotid bruit, no JVD, supple, symmetrical, trachea midline, and thyroid not enlarged, symmetric, no tenderness/mass/nodules  Lungs: clear to auscultation bilaterally  Heart: S1, S2 normal  Abdomen:  BS plus non tender ascites  Extremities: edema plus edema  Neurologic: Mental status: alertness: alert    CBC:   Recent Labs     11/12/22  1420 11/13/22  0612   WBC 7.5 7.2   HGB 12.4* 12.3*    182     BMP:    Recent Labs     11/12/22  1420 11/13/22  0612    140   K 4.5 4.2   CL 97* 99   CO2 29 31   BUN 35* 32*   CREATININE 1.9* 1.7*   GLUCOSE 112* 116*     Troponin: No results for input(s): TROPONINI in the last 72 hours.   BNP: No results for input(s): BNP in the last 72 hours. Lipids: No results for input(s): CHOL, HDL in the last 72 hours. Invalid input(s): LDLCALCU  ABGs:   Lab Results   Component Value Date/Time    PO2ART 73 06/23/2022 11:00 AM    XHH2LOD 63.0 06/23/2022 11:00 AM     -----------------------------------------------------------------      Assessment and Recommendations     Patient Active Problem List   Diagnosis Code    Shortness of breath R06.02    History of colonic polyps Z86.010    Ascites due to alcoholic cirrhosis (HCC) C36.95    Mixed hyperlipidemia E78.2    Hypertension I10    History of alcohol abuse F10.11    Gastroesophageal reflux disease K21.9    Anxiety F41.9    Pulmonary nodules R91.8    Hyperglycemia R73.9    Acquired hypothyroidism E03.9    Class 3 severe obesity with body mass index (BMI) of 40.0 to 44.9 in adult ([facility] E66.01, Z68.41    Bilateral hearing loss H91.93    Increased ammonia level R79.89    JENNIFER (obstructive sleep apnea) G47.33    Portal hypertension (HCC) K76.6    SOB (shortness of breath) R06.02    Cirrhosis of liver with ascites (HCC) K74.60, R18.8    Secondary esophageal varices without bleeding (HCC) I85.10    Gastric polyps K31.7    Hearing loss H91.90    Sepsis (HCC) A41.9    Hepatic encephalopathy K76.82    Recurrent right pleural effusion J90    Acute respiratory failure (HCC) J96.00    Acute kidney injury superimposed on chronic kidney disease (HCC) N17.9, N18.9    Generalized weakness R53.1    Oropharyngeal dysphagia R13.12    Cellulitis of left lower extremity L03.116    Type 2 diabetes mellitus without complication, without long-term current use of insulin (HCC) E11.9    Thyroid disease E07.9    Type 2 diabetes mellitus with chronic kidney disease E11.22    Hypersomnia G47.10    Ex-smoker Z87.891    Obesity (BMI 30-39. 9) E66.9    Recurrent pleural effusion on right J90    Pleural effusion J90    Hypoxia R09.02    AMS (altered mental status) R41.82   MDM  CANDIE - prerenal VS Hepatorenal  CKd3  Suggest  Treat him as HRS with maximum therapy  Midodrine, octreotide, albumin and lasix   He may also have a UTI- on antibiotics  Dr Jose Billingsley will follow in am    Alex Manjarrez MD, MD

## 2022-11-13 NOTE — PROGRESS NOTES
4 Eyes Skin Assessment     NAME:  Driss Calloway OF BIRTH:  1945  MEDICAL RECORD NUMBER:  [id number]    The patient is being assess for  Admission    I agree that 2 RN's have performed a thorough Head to Toe Skin Assessment on the patient. ALL assessment sites listed below have been assessed. Areas assessed by both nurses:    Head, Face, Ears, Shoulders, Back, Chest, Arms, Elbows, Hands, Sacrum. Buttock, Coccyx, Ischium, and Legs. Feet and Heels        Does the Patient have a Wound?  No noted wound(s)       Andre Prevention initiated:  NA   Wound Care Orders initiated:  NA    Pressure Injury (Stage 3,4, Unstageable, DTI, NWPT, and Complex wounds) if present place referral/consult order under [de-identified] NA    New and Established Ostomies if present place consult order under : NA      Nurse 1 eSignature: Electronically signed by Russ Renner RN on 11/12/22 at 9:59 PM EST    **SHARE this note so that the co-signing nurse is able to place an eSignature**    Nurse 2 eSignature: Electronically signed by Tammi Nguyen RN on 11/12/22 at 10:20 PM EST

## 2022-11-13 NOTE — PLAN OF CARE
This morning patient is comfortable, denies SOB/CP, and remains on room air. IV rocephin ordered for UTI. Lactulose x2, albumin, and octreotide given for elevated ammonia. GI, neurology, and nephrology on consult. Patient is A+O x4 however loses train of thought and is easily distracted. US carotid and MRI of brain completed. Plan pending progression. Bed locked in lowest position, bed alarm activated, call light within reach, and frequent rounding in progress. Will continue to monitor.      Problem: Discharge Planning  Goal: Discharge to home or other facility with appropriate resources  Outcome: Progressing     Problem: Pain  Goal: Verbalizes/displays adequate comfort level or baseline comfort level  Outcome: Progressing     Problem: Safety - Adult  Goal: Free from fall injury  Outcome: Progressing     Problem: Neurosensory - Adult  Goal: Achieves stable or improved neurological status  Outcome: Progressing  Goal: Achieves maximal functionality and self care  Outcome: Progressing     Problem: Respiratory - Adult  Goal: Achieves optimal ventilation and oxygenation  Outcome: Progressing     Problem: Cardiovascular - Adult  Goal: Maintains optimal cardiac output and hemodynamic stability  Outcome: Progressing  Goal: Absence of cardiac dysrhythmias or at baseline  Outcome: Progressing     Problem: Skin/Tissue Integrity - Adult  Goal: Skin integrity remains intact  Outcome: Progressing     Problem: Musculoskeletal - Adult  Goal: Return mobility to safest level of function  Outcome: Progressing  Goal: Maintain proper alignment of affected body part  Outcome: Progressing  Goal: Return ADL status to a safe level of function  Outcome: Progressing     Problem: Gastrointestinal - Adult  Goal: Minimal or absence of nausea and vomiting  Outcome: Progressing  Goal: Maintains or returns to baseline bowel function  Outcome: Progressing  Goal: Maintains adequate nutritional intake  Outcome: Progressing     Problem: Infection - Adult  Goal: Absence of infection at discharge  Outcome: Progressing  Goal: Absence of infection during hospitalization  Outcome: Progressing     Problem: Metabolic/Fluid and Electrolytes - Adult  Goal: Electrolytes maintained within normal limits  Outcome: Progressing     Problem: Hematologic - Adult  Goal: Maintains hematologic stability  Outcome: Progressing     Problem: Anxiety  Goal: Will report anxiety at manageable levels  Description: INTERVENTIONS:  1. Administer medication as ordered  2. Teach and rehearse alternative coping skills  3. Provide emotional support with 1:1 interaction with staff  Outcome: Progressing     Problem: Coping  Goal: Pt/Family able to verbalize concerns and demonstrate effective coping strategies  Description: INTERVENTIONS:  1. Assist patient/family to identify coping skills, available support systems and cultural and spiritual values  2. Provide emotional support, including active listening and acknowledgement of concerns of patient and caregivers  3. Reduce environmental stimuli, as able  4. Instruct patient/family in relaxation techniques, as appropriate  5. Assess for spiritual pain/suffering and initiate Spiritual Care, Psychosocial Clinical Specialist consults as needed  Outcome: Progressing     Problem: Decision Making  Goal: Pt/Family able to effectively weigh alternatives and participate in decision making related to treatment and care  Description: INTERVENTIONS:  1. Determine when there are differences between patient's view, family's view, and healthcare provider's view of condition  2. Facilitate patient and family articulation of goals for care  3. Help patient and family identify pros/cons of alternative solutions  4. Provide information as requested by patient/family  5. Respect patient/family right to receive or not to receive information  6. Serve as a liaison between patient and family and health care team  7.  Initiate Consults from Ethics, Palliative Care or initiate Family Care Conference as is appropriate  Outcome: Progressing     Problem: Confusion  Goal: Confusion, delirium, dementia, or psychosis is improved or at baseline  Description: INTERVENTIONS:  1. Assess for possible contributors to thought disturbance, including medications, impaired vision or hearing, underlying metabolic abnormalities, dehydration, psychiatric diagnoses, and notify attending LIP  2. [place] high risk fall precautions, as indicated  3. Provide frequent short contacts to provide reality reorientation, refocusing and direction  4. Decrease environmental stimuli, including noise as appropriate  5. Monitor and intervene to maintain adequate nutrition, hydration, elimination, sleep and activity  6. If unable to ensure safety without constant attention obtain sitter and review sitter guidelines with assigned personnel  7. Initiate Psychosocial CNS and Spiritual Care consult, as indicated  Outcome: Progressing     Problem: Behavior  Goal: Pt/Family maintain appropriate behavior and adhere to behavioral management agreement, if implemented  Description: INTERVENTIONS:  1. Assess patient/family's coping skills and  non-compliant behavior (including use of illegal substances)  2. Notify security of behavior or suspected illegal substances which indicate the need for search of the family and/or belongings  3. Encourage verbalization of thoughts and concerns in a socially appropriate manner  4. Utilize positive, consistent limit setting strategies supporting safety of patient, staff and others  5. Encourage participation in the decision making process about the behavioral management agreement  6. If a visitor's behavior poses a threat to safety call refer to organization policy.   7. Initiate consult with , Psychosocial CNS, Spiritual Care as appropriate  Outcome: Progressing

## 2022-11-13 NOTE — CONSULTS
[address], [facility]                                CONSULTATION    PATIENT NAME: Cammie Pratt                   :         1945  MED REC NO:   [id number]                          ROOM:       [id number]  ACCOUNT NO:   [de-identified]                           ADMIT DATE:  2022  PROVIDER:     Erickson Sanches MD    CONSULT DATE:  2022    PRIMARY PHYSICIAN:  Dr. Loretta Betancourt. CHIEF COMPLAINT:  History of alcohol liver disease with altered  mental status, rule out hepatic encephalopathy. HISTORY OF PRESENT ILLNESS:  As follows. The patient is a 73-year  white gentleman, a patient of Dr. Mark Brock who is also being  followed up by Dr. Lainey Cannon, whom I am covering with past medical  history significant for history of EtOH abuse with alcohol liver  disease and stigmata of portal hypertension i.e. ascites status post  multiple large-volume paracentesis and esophageal varices on beta  blocker, diabetes mellitus, gastroesophageal reflux disease,  hypertension, hyperlipidemia, obesity, obstructive sleep apnea,  thyroid disease, anxiety disorder who was admitted to the hospital  on 2022, with altered mental status. The blood workup done in  the emergency room comprised of Chem profile, which was remarkable  for BUN 35, creatinine 1.9. LFTs showed an AST of 40 and ALT of 29,  blood ammonia was 66, today blood ammonia is 83 and CBC shows WBC  count of 7.2, hemoglobin 12.3, platelet count of 205,054. The  patient denies abdominal pain, nausea, vomiting, hematemesis, melena  or hematochezia. The patient is on Protonix along with lactulose  and Xifaxan. The patient is hemodynamically stable. The patient  did have a CAT scan done of the abdomen and pelvis on May 5, 2022,  and was noted to have large volume ascites and also complete  opacification of the right hemithorax was noted with large right  pleural effusion.   The patient has had multiple colonoscopies done  in the past and according to the patient last colonoscopy was 3 or 4  years ago and according to the patient, he is not due for repeat  colonoscopy; however, EGD done on the 01/19/2022 by Dr. Carie Motley  showed moderate-to-large esophageal varices with no stigmata of  recent bleeding and also small hiatal hernia was noted and multiple  3 mm polyps were noted in the antrum with overlying erosions and  findings consistent with portal hypertensive gastropathy was noted. The patient is hemodynamically stable at present. REVIEW OF SYSTEMS:  CENTRAL NERVOUS SYSTEM:  The patient denies headache or focal  sensorimotor symptoms. CARDIOVASCULAR:  No history of chest pain, or shortness of breath. The patient however does complain of leg swelling. GENITOURINARY:  No history of dysuria, pyuria or hematuria. MUSCULOSKELETAL:  The patient complains of generalized weakness. RESPIRATORY:  No history of cough, hemoptysis, fever or chills. PAST MEDICAL HISTORY:  Significant for history of EtOH abuse with  alcohol liver disease and stigmata of portal hypertension i.e.  ascites and moderate size esophageal varices and status post  multiple large-volume paracentesis and also history of diabetes  mellitus, hypertension, hyperlipidemia, obesity, obstructive sleep  apnea, thyroid disease, anxiety disorder. FAMILY HISTORY:  Not contributory. MEDICATIONS:  Please refer to the chart. SOCIOECONOMIC HISTORY:  There is longstanding history of EtOH abuse,  but the patient is not currently drinking alcohol and the patient is  a former smoker. There is no history of recreational drug use. SURGERIES:  The patient has had cholecystectomy, appendectomy, foot  surgery, tonsillectomy and adenoidectomy, vasectomy and an EGD done  on the 01/19/2032 and multiple colonoscopies in the remote past.    ALLERGIES:  The patient has multiple allergies, please refer to the  chart.     PHYSICAL EXAMINATION:  GENERAL:  Shows a 79-year-old white male of average build and fair  nutritional status for his age, who is lying flat in bed in no acute  distress. He is however hard-of-hearing but is awake, alert and  oriented. VITAL SIGNS:  Please refer to the chart. HEENT:  Shows skull to be atraumatic. NECK:  Supple. CHEST:  Showed diminished breath sounds. HEART:  S1 and S2 are normal.  ABDOMEN:  Obese, soft and nontender with no guarding or rigidity. Liver and spleen not palpable, ascites is present. RECTAL:  Deferred. CNS:  The patient to be awake, alert and oriented. There are no  focal sensorimotor signs. MUSCULOSKELETAL SYSTEM: Shows 1 to 2+ edema of the lower  extremities. LABORATORY DATA:  As above mentioned. IMPRESSION:  1.  A 73-year white gentleman with multiple comorbidities including  history of EtOH abuse with alcohol liver disease and stigmata of  portal hypertension i.e. ascites and moderate size esophageal  varices presents with altered mental status, rule out hepatic  encephalopathy. 2.  Elevated BUN and creatinine, rule out acute kidney  injury/hepatorenal syndrome. 3.  No evidence of gross GI bleeding at present. RECOMMENDATIONS:  1. Agree with present management with Protonix along with lactulose  and Xifaxan. 2.  Monitor the patient's CBC, Chem profile, blood ammonia level,  PT/PTT, INR in a.m.  3.  Agree with nephrology consult to rule out hepatorenal syndrome. 4.  Monitor the patient for decompensation of his liver disease. 5.  Overall prognosis remains guarded in view of the patient's  advanced liver disease. 6.  Repeat abdominal paracentesis as needed. 7.  We will also continue the patient on Rocephin for now. 8.  Dr. Maximo Garcia to resume care of the patient from 11/19/2022.  9. The case and plan have been discussed in detail, his bedside RN  and by the hospitalist, Dr. Jaren Felipe. 10.  No need for EGD or colonoscopy at this point.   10.  2 gram salt Mahendra Dunn MD    D: 11/13/2022 12:25:49       T: 11/13/2022 12:28:22      [email or web address]  Job#: [id number]     Doc#: [id number]    CC:

## 2022-11-13 NOTE — PROGRESS NOTES
V2.0  [facility] [place]      Name:  Annie Michelle /Age/Sex: 1945  (78 y.o. male)   MRN & CSN:  [id number] & [id number] Encounter Date/Time: 2022 1:14 PM EST    Location:  [address] [place] [address]- PCP: Duong Bower MD       Hospital Day: 2    Assessment and Plan:   Annie Michelle is a 78 y.o. male with pmh of chronic kidney disease, cirrhosis of the liver with ascites, esophageal varices who presents with AMS (altered mental status)      Interval events:  -Briefly, patient is 72-year-old male with a forementioned history who presents via ambulance with complaints of altered mental status per charts. Most information obtained from family on admission. Patient's wife stated he woke up the morning of admission  and felt very confused. He went to make a cup of coffee and did not know how to do that. Patient's family member stated he did have paracentesis 1 day before hospitalization. Denies any fever/chills/pain. Denies any exacerbating factors. She noticed patient's face to be flushed. At the time of examination patient was AAO X1 only able to tell his name. Patient was seeing a lot of things which do not make any sense although grandma/vocabulary/speech seemed appropriate in terms of rate/rhythm and volume, the content was out of sync with the situation. Vital signs upon arrival with blood pressure 96/57 mmHg, rest vital signs stable off oxygen. Labs with BUN 35/creatinine 1.9, GFR 37, ammonia 66, albumin 3.3, AST 40 bilirubin 0.9 and ALT 23. Urine drug screen remarkable. Urinalysis with moderate leukocyte esterase, positive nitrate and 11 WBC per high-power field. CT head with no acute abnormalities. Chest x-ray with\"Redemonstration of large right-sided pleural effusion and opacity of the right lung, appearing grossly similar to the prior study\". Patient underwent IR guided thoracentesis.   Sliding scale and paracentesis 10/11.  --patient seen and examined at bedside, vital signs stable with blood pressure improved without intervention, patient comfortable off oxygen and currently AAO X1. Labs with no leukocytosis, ammonia uptrending 83<66, GI and renal service consulted. Infectious work-up with blood cultures/urine cultures with no growth reported so far. Plan:   Altered mental status:  Possible hepatic Encephalopathy:  Acute uncomplicated urinary tract infection:  -Patient admitted to [facility] precautions/delirium precautions  -Ammonia level 83<66  -Lactulose 30 g 3 times daily  -We will continue home Haldol, rifaximin  -Neurochecks every 6 hours for 24 hours  -CT head negative for acute intracranial abnormalities, unable to complete CTA due to GFR  -No leukocytosis, vital signs stable  -Patient initiated on Rocephin in ED, continued on admission  -Hold all sedating medications  -MRI brain without contrast-pending  -GI consulted, will appreciate recs  -Neurology consult on admission for altered mental status-likely suspecting toxic metabolic encephalopathy superimposed on acute cystitis, low suspicion for GCA or seizure, started patient with aspirin 81 mg p.o. daily  -Follow fever/WBC curve  -Follow infectious work-up  -Monitor mental status improvement after antibiotic uncooperation  Recurrent ascites:  History of right-sided pleural effusion:  -Multiple prior paracentesis, multiple admissions status post paracentesis 11/11 and thoracentesis 11/10  -Continue home medications  -GI consult  Alcoholic liver disease:  Portal hypertension:   History of esophageal varices:  History of hepatic encephalopathy:  -Continue home medication nadolol, spironolactone and Lasix  -Continue rifaximin and lactulose  -No abdominal pain reported  -GI consult  CKD 3:  -Baseline creatinine 1.2-1.3  -Strict intake/output record  -Avoid nephrotoxic medications  -Creatinine 1.9, will have renal service on board  HTN:  -Continue home medications  Hypothyroidism:  -Continue Synthyroid  History of alcohol abuse:  -Monitor for withdrawal      Diet ADULT DIET; Regular; Low Sodium (2 gm); 2000 ml   DVT Prophylaxis X Lovenox, Heparin, SCDs, ambulation, Eliquis, Xarelto Coumadin   Code Status Full Code   Disposition From: Home with wife  Expected Disposition: TBD  Estimated Date of Discharge: 2 days  Patient requires continued admission due to currently having altered mental status undergoing evaluation   Surrogate Decision Maker/ POA      Subjective:     Chief Complaint: Altered Mental Status       Samantha Bustamante is a 78 y.o. male who presents with altered mental status     Patient denies any active complaints, however AAO X1    Review of Systems:    Review of Systems    Unable to perform a reliable review of system given patient was a social history and he is currently AAO X1. We will try to call the family for further information    Objective: Intake/Output Summary (Last 24 hours) at 11/13/2022 1314  Last data filed at 11/13/2022 1204  Gross per 24 hour   Intake 440 ml   Output 225 ml   Net 215 ml        Vitals:   Vitals:    11/13/22 1158   BP: 123/70   Pulse:    Resp: 16   Temp: 97.6 °F (36.4 °C)   SpO2: 95%       Physical Exam:     General: NAD  Eyes: EOMI  ENT: neck supple  Cardiovascular: Regular rate. Respiratory: Clear to auscultation  Gastrointestinal: Soft, non tender  Genitourinary: no suprapubic tenderness  Musculoskeletal: No edema  Skin: warm, dry  Neuro: Alert. Psych: Mood appropriate.      Medications:   Medications:    aspirin  81 mg Oral Daily    albuterol sulfate HFA  2 puff Inhalation 4x daily    DULoxetine  60 mg Oral Daily    furosemide  40 mg Oral BID    levothyroxine  50 mcg Oral Daily    nadolol  40 mg Oral Nightly    spironolactone  100 mg Oral BID    sodium chloride flush  5-40 mL IntraVENous 2 times per day    cefTRIAXone (ROCEPHIN) IV  1,000 mg IntraVENous Q24H    lactulose  30 g Oral TID    enoxaparin  40 mg SubCUTAneous Daily    pantoprazole  40 mg Oral QAM AC    rifAXIMin  400 mg Oral TID    omega-3 acid ethyl esters  2 g Oral Daily      Infusions:    sodium chloride       PRN Meds: sodium chloride flush, 5-40 mL, PRN  sodium chloride, 25 mL, PRN  ondansetron, 4 mg, Q8H PRN   Or  ondansetron, 4 mg, Q6H PRN  polyethylene glycol, 17 g, Daily PRN        Labs      Recent Results (from the past 24 hour(s))   EKG 12 Lead    Collection Time: 11/12/22  1:22 PM   Result Value Ref Range    Ventricular Rate 73 BPM    Atrial Rate 73 BPM    P-R Interval 132 ms    QRS Duration 78 ms    Q-T Interval 416 ms    QTc Calculation (Bazett) 458 ms    P Axis -11 degrees    R Axis 46 degrees    T Axis 23 degrees    Diagnosis       Normal sinus rhythm  Normal ECG  When compared with ECG of 04-OCT-2022 12:46,  No significant change was found  Confirmed by TANIA Viera ([id number]) on 11/12/2022 7:39:58 PM     CBC with Auto Differential    Collection Time: 11/12/22  2:20 PM   Result Value Ref Range    WBC 7.5 4.0 - 10.5 K/CU MM    RBC 3.72 (L) 4.6 - 6.2 M/CU MM    Hemoglobin 12.4 (L) 13.5 - 18.0 GM/DL    Hematocrit 37.9 (L) 42 - 52 %    .9 (H) 78 - 100 FL    MCH 33.3 (H) 27 - 31 PG    MCHC 32.7 32.0 - 36.0 %    RDW 15.4 (H) 11.7 - 14.9 %    Platelets 116 948 - 382 K/CU MM    MPV 10.5 7.5 - 11.1 FL    Differential Type AUTOMATED DIFFERENTIAL     Segs Relative 75.8 (H) 36 - 66 %    Lymphocytes % 8.6 (L) 24 - 44 %    Monocytes % 11.2 (H) 0 - 4 %    Eosinophils % 3.6 (H) 0 - 3 %    Basophils % 0.4 0 - 1 %    Segs Absolute 5.7 K/CU MM    Lymphocytes Absolute 0.6 K/CU MM    Monocytes Absolute 0.8 K/CU MM    Eosinophils Absolute 0.3 K/CU MM    Basophils Absolute 0.0 K/CU MM    Nucleated RBC % 0.0 %    Total Nucleated RBC 0.0 K/CU MM    Total Immature Neutrophil 0.03 K/CU MM    Immature Neutrophil % 0.4 0 - 0.43 %   Comprehensive Metabolic Panel    Collection Time: 11/12/22  2:20 PM   Result Value Ref Range    Sodium 137 135 - 145 MMOL/L    Potassium 4.5 3.5 - 5.1 MMOL/L    Chloride 97 (L) 99 - 110 mMol/L    CO2 29 21 - 32 MMOL/L    BUN 35 (H) 6 - 23 MG/DL    Creatinine 1.9 (H) 0.9 - 1.3 MG/DL    Est, Glom Filt Rate 37 (L) >60 mL/min/1.73m2    Glucose 112 (H) 70 - 99 MG/DL    Calcium 10.1 8.3 - 10.6 MG/DL    Albumin 3.3 (L) 3.4 - 5.0 GM/DL    Total Protein 7.3 6.4 - 8.2 GM/DL    Total Bilirubin 0.9 0.0 - 1.0 MG/DL    ALT 23 10 - 40 U/L    AST 40 (H) 15 - 37 IU/L    Alkaline Phosphatase 96 40 - 129 IU/L    Anion Gap 11 4 - 16   Ammonia    Collection Time: 11/12/22  2:20 PM   Result Value Ref Range    Ammonia 66 (H) 16 - 60 UMOL/L   Lactate, Sepsis    Collection Time: 11/12/22  2:20 PM   Result Value Ref Range    Lactic Acid, Sepsis 1.5 0.5 - 1.9 mMOL/L   Lactate, Sepsis    Collection Time: 11/12/22  3:45 PM   Result Value Ref Range    Lactic Acid, Sepsis 1.3 0.5 - 1.9 mMOL/L   Urinalysis    Collection Time: 11/12/22  5:36 PM   Result Value Ref Range    Color, UA YELLOW YELLOW    Clarity, UA CLEAR CLEAR    Glucose, Urine NEGATIVE NEGATIVE MG/DL    Bilirubin Urine NEGATIVE NEGATIVE MG/DL    Ketones, Urine NEGATIVE NEGATIVE MG/DL    Specific Gravity, UA 1.010 1.001 - 1.035    Blood, Urine NEGATIVE NEGATIVE    pH, Urine 7.0 5.0 - 8.0    Protein, UA NEGATIVE NEGATIVE MG/DL    Urobilinogen, Urine 1.0 0.2 - 1.0 MG/DL    Nitrite Urine, Quantitative POSITIVE (A) NEGATIVE    Leukocyte Esterase, Urine MODERATE NUMBER OR AMOUNT OBSERVED (A) NEGATIVE   Microscopic Urinalysis    Collection Time: 11/12/22  5:36 PM   Result Value Ref Range    RBC, UA NONE SEEN 0 - 3 /HPF    WBC, UA 11 (H) 0 - 2 /HPF    Bacteria, UA NEGATIVE NEGATIVE /HPF    Squam Epithel, UA <1 /HPF   Drug screen multi urine    Collection Time: 11/13/22  6:00 AM   Result Value Ref Range    Cannabinoid Scrn, Ur NEGATIVE NEGATIVE    Amphetamines NEGATIVE NEGATIVE    Cocaine Metabolite NEGATIVE NEGATIVE    Benzodiazepine Screen, Urine NEGATIVE NEGATIVE    Barbiturate Screen, Ur NEGATIVE NEGATIVE    Opiates, Urine NEGATIVE NEGATIVE    Phencyclidine, Urine NEGATIVE NEGATIVE    Oxycodone NEGATIVE NEGATIVE   CBC with Auto Differential    Collection Time: 11/13/22  6:12 AM   Result Value Ref Range    WBC 7.2 4.0 - 10.5 K/CU MM    RBC 3.73 (L) 4.6 - 6.2 M/CU MM    Hemoglobin 12.3 (L) 13.5 - 18.0 GM/DL    Hematocrit 38.0 (L) 42 - 52 %    .9 (H) 78 - 100 FL    MCH 33.0 (H) 27 - 31 PG    MCHC 32.4 32.0 - 36.0 %    RDW 15.3 (H) 11.7 - 14.9 %    Platelets 652 007 - 463 K/CU MM    MPV 10.4 7.5 - 11.1 FL    Differential Type AUTOMATED DIFFERENTIAL     Segs Relative 68.1 (H) 36 - 66 %    Lymphocytes % 12.4 (L) 24 - 44 %    Monocytes % 12.6 (H) 0 - 4 %    Eosinophils % 5.8 (H) 0 - 3 %    Basophils % 0.8 0 - 1 %    Segs Absolute 4.9 K/CU MM    Lymphocytes Absolute 0.9 K/CU MM    Monocytes Absolute 0.9 K/CU MM    Eosinophils Absolute 0.4 K/CU MM    Basophils Absolute 0.1 K/CU MM    Nucleated RBC % 0.0 %    Total Nucleated RBC 0.0 K/CU MM    Total Immature Neutrophil 0.02 K/CU MM    Immature Neutrophil % 0.3 0 - 0.43 %   Comprehensive Metabolic Panel w/ Reflex to MG    Collection Time: 11/13/22  6:12 AM   Result Value Ref Range    Sodium 140 135 - 145 MMOL/L    Potassium 4.2 3.5 - 5.1 MMOL/L    Chloride 99 99 - 110 mMol/L    CO2 31 21 - 32 MMOL/L    BUN 32 (H) 6 - 23 MG/DL    Creatinine 1.7 (H) 0.9 - 1.3 MG/DL    Est, Glom Filt Rate 42 (L) >60 mL/min/1.73m2    Glucose 116 (H) 70 - 99 MG/DL    Calcium 10.0 8.3 - 10.6 MG/DL    Albumin 3.2 (L) 3.4 - 5.0 GM/DL    Total Protein 6.6 6.4 - 8.2 GM/DL    Total Bilirubin 0.9 0.0 - 1.0 MG/DL    ALT 24 10 - 40 U/L    AST 40 (H) 15 - 37 IU/L    Alkaline Phosphatase 101 40 - 128 IU/L    Anion Gap 10 4 - 16   Ammonia    Collection Time: 11/13/22  6:12 AM   Result Value Ref Range    Ammonia 83 (H) 16 - 60 UMOL/L        Imaging/Diagnostics Last 24 Hours   CT HEAD WO CONTRAST    Result Date: 11/12/2022  EXAMINATION: CT OF THE HEAD WITHOUT CONTRAST  11/12/2022 1:46 pm TECHNIQUE: CT of the head was performed without the administration of intravenous contrast. Automated exposure control, iterative reconstruction, and/or weight based adjustment of the mA/kV was utilized to reduce the radiation dose to as low as reasonably achievable. COMPARISON: 05/10/2022 HISTORY: ORDERING SYSTEM PROVIDED HISTORY: ams TECHNOLOGIST PROVIDED HISTORY: Reason for exam:->ams Has a \"code stroke\" or \"stroke alert\" been called? ->No Decision Support Exception - unselect if not a suspected or confirmed emergency medical condition->Emergency Medical Condition (MA) Reason for Exam: AMS FINDINGS: BRAIN/VENTRICLES: The ventricles and sulci are diffusely enlarged. Low attenuation is seen in the periventricular and subcortical white matter. No acute intracranial hemorrhage or acute infarct is identified. ORBITS: The visualized portion of the orbits demonstrate no acute abnormality. SINUSES: The visualized paranasal sinuses and mastoid air cells demonstrate no acute abnormality. SOFT TISSUES/SKULL:  No acute abnormality of the visualized skull or soft tissues. No acute intracranial abnormality. XR CHEST PORTABLE    Result Date: 11/12/2022  EXAMINATION: ONE XRAY VIEW OF THE CHEST 11/12/2022 12:57 pm COMPARISON: 11/10/2022. HISTORY: ORDERING SYSTEM PROVIDED HISTORY: ams TECHNOLOGIST PROVIDED HISTORY: Reason for exam:->ams Reason for Exam: ams FINDINGS: The cardiac silhouette is indeterminate. There is redemonstration large right-sided pleural effusion with opacity of the right lung with relative sparing of right lung apex. The left lung appears clear. Redemonstration of large right-sided pleural effusion and opacity of the right lung, appearing grossly similar to the prior study.        Electronically signed by Smita Leong MD on 11/13/2022 at 1:14 PM

## 2022-11-13 NOTE — CONSULTS
Neurology Service Consult Note  [address]   Patient Name: Cindy Leiva  : 1945        Subjective:   Reason for consult:   78 y.o. -handed male presenting to [facility] with complaints of AMS, he states that yesterday he was trying to make coffee and he didn't put water in the , then after his wife noticed he didn't put water in the coffee he wanted to go outside but his wife told him not to do that, then he admits to not remembering anything from the conversation about going outside, and then when the paramedics came. He remembers the paramedics coming and he feels this is when he came to, he states that he remembers being in the ER and the full ER work up. Stated that his wife thought he had marbles in his mouth and that he was slurring his words. Patient has long standing history of ICU visits, pleural effusions and thoracentesis. He has an UTI upon arrival and he states he thought he had one. He also tells me he has oxygen at night but not during the day. I do not see where he has suffered from a suffered from a stroke, he is not on a blood thinner. He tells me he is over 200 days sober. Patient states he doesn't remember having any facial droops, unilateral arm or leg weakness or sensation changes. He feels he is back to baseline today. Wife not at the bedside. He denies CP and SOB. There was no reported seizure activity .        Past Medical History:   Diagnosis Date    Anxiety     Cirrhosis, alcoholic ([facility]     Diabetes mellitus ([facility]     GERD (gastroesophageal reflux disease)     GERD (gastroesophageal reflux disease)     Hyperlipidemia     Hypertension     Portal hypertension ([facility])     Pulmonary nodules     Sleep apnea     SOB (shortness of breath)     Thyroid disease     :   Past Surgical History:   Procedure Laterality Date    APPENDECTOMY      CHOLECYSTECTOMY      COLONOSCOPY      ENDOSCOPY, COLON, DIAGNOSTIC      FOOT SURGERY needle removed,not sure which foot, per wife.     TONSILLECTOMY      UPPER GASTROINTESTINAL ENDOSCOPY N/A 2022    EGD BIOPSY performed by Errlo Oliva MD at [address] [place] [address]       Medications:  Scheduled Meds:   albuterol sulfate HFA  2 puff Inhalation 4x daily    DULoxetine  60 mg Oral Daily    furosemide  40 mg Oral BID    levothyroxine  50 mcg Oral Daily    nadolol  40 mg Oral Nightly    spironolactone  100 mg Oral BID    sodium chloride flush  5-40 mL IntraVENous 2 times per day    cefTRIAXone (ROCEPHIN) IV  1,000 mg IntraVENous Q24H    lactulose  30 g Oral TID    enoxaparin  40 mg SubCUTAneous Daily    pantoprazole  40 mg Oral QAM AC    rifAXIMin  400 mg Oral TID    omega-3 acid ethyl esters  2 g Oral Daily     Continuous Infusions:   sodium chloride       PRN Meds:.sodium chloride flush, sodium chloride, ondansetron **OR** ondansetron, polyethylene glycol    Allergies   Allergen Reactions    Lisinopril Swelling     Tongue swelling      Zetia [Ezetimibe] Swelling     Facial swelling    Atorvastatin     Codeine Other (See Comments)     Blood pressure drops    Hydrochlorothiazide     Hydroxyzine      Fatigue and depressed    Lexapro [Escitalopram Oxalate]      Increased depression    Pravastatin      Social History     Socioeconomic History    Marital status:      Spouse name: Not on file    Number of children: Not on file    Years of education: Not on file    Highest education level: Not on file   Occupational History     Comment: Retired    Tobacco Use    Smoking status: Former     Packs/day: 1.00     Years: 14.00     Pack years: 14.00     Types: Cigarettes     Quit date:      Years since quittin.8    Smokeless tobacco: Never   Vaping Use    Vaping Use: Never used   Substance and Sexual Activity    Alcohol use: Not Currently    Drug use: Never    Sexual activity: Yes     Partners: Female   Other Topics Concern    Not on file   Social History Narrative    Not on file Social Determinants of Health     Financial Resource Strain: Low Risk     Difficulty of Paying Living Expenses: Not hard at all   Food Insecurity: No Food Insecurity    Worried About Running Out of Food in the Last Year: Never true    Ran Out of Food in the Last Year: Never true   Transportation Needs: Not on file   Physical Activity: Not on file   Stress: Not on file   Social Connections: Not on file   Intimate Partner Violence: Not on file   Housing Stability: Not on file      Family History   Problem Relation Age of Onset    Hypertension Brother     Diabetes Other          ROS (10 systems)  In addition to that documented in the HPI above, the additional ROS was obtained:  Constitutional: Denies fevers or chills  Eyes: Denies vision changes  ENMT: Denies sore throat  CV: Denies chest pain  Resp: Denies SOB  GI: Denies vomiting or diarrhea  : Denies painful urination  MSK: Denies recent trauma  Skin: Denies new rashes  Neuro: Denies new numbness or tingling or weakness  Endocrine: Denies unexpected weight loss  Heme: Denies bleeding disorders    Physical Exam:         Wt Readings from Last 3 Encounters:   11/13/22 218 lb 14.7 oz (99.3 kg)   11/10/22 241 lb (109.3 kg)   11/07/22 236 lb (107 kg)     Temp Readings from Last 3 Encounters:   11/13/22 97.5 °F (36.4 °C) (Oral)   11/11/22 98.3 °F (36.8 °C) (Temporal)   11/10/22 97.8 °F (36.6 °C) (Temporal)     BP Readings from Last 3 Encounters:   11/13/22 105/63   11/11/22 115/65   11/10/22 95/63     Pulse Readings from Last 3 Encounters:   11/13/22 76   11/11/22 75   11/10/22 61        Gen: A&O x 4, NAD, cooperative  HEENT: NC/AT, EOMI, PERRL, mmm, neck supple, no meningeal signs;    Heart: regular   Lungs: no distress  Ext: no edema, no calf tenderness b/l  Psych: normal mood and affect  Skin: no rashes or lesions    NEUROLOGIC EXAM:    Mental Status: A&O to self, location, month and year, NAD, speech clear, language fluent, repetition and naming intact, follows commands appropriately    Cranial Nerve Exam:   CN II-XII: , sensation V1-V3 intact b/l, muscles of facial expression symmetric; severely [place] palate elevates symmetrically, shoulder elevation symmetric and tongue protrudes midline with movement side to side. Motor Exam:       Strength 5/5 UE's/LE's b/l  Tone and bulk normal   No pronator drift    Deep Tendon Reflexes: 2/4 biceps, brachioradialis, patellar, and achilles b/l; flexor plantar responses b/l    Sensation: Intact light touch UE's/LE's b/l    Coordination/Cerebellum:       Tremors--none      Rapidly alternating movements: no dysdiadochokinesia b/l                Finger-to-Nose: no dysmetria b/l    Gait and stance:      Gait: deferred      LABS:     Recent Labs     11/10/22  1110 11/12/22  1420 11/13/22  0612   WBC 6.6 7.5 7.2   NA  --  137 140   K  --  4.5 4.2   CL  --  97* 99   CO2  --  29 31   BUN  --  35* 32*   CREATININE  --  1.9* 1.7*   GLUCOSE  --  112* 116*   INR 1.02  --   --          IMAGING:      CT head:  No acute intracranial abnormality. MRI Brain pending  CUS pending     ASSESSMENT/PLAN:     44-year-old male with altered mental status and transient amnesia, suspect toxic metabolic encephalopathy superimposed on acute cystitis, low suspicion for GCA, or seizure based on description of events but will rule out stroke with MRI brain and CUS. CT of the head is unremarkable. Neuro exam is unremarkable. Start ASA 81mg daily. Will continue to follow after metabolic work up. Thank you for allowing us to participate in the care of your patient. If there are any questions regarding evaluation please feel free to contact us. MOHINDER Yeung - CNP, 11/13/2022     Attending Note:  This was a shared/split visit with MOHINDER Prince. I have reviewed the chart and we have discussed this case in detail. The patient was seen and examined by myself. Pertinent labs and imaging have been personally reviewed.  Changes were made to the note as appropriate. I concur with the above assessment and plan. He was struggling to provide any additional information due to his hearing impairment, however he reported he was feeling \"great\", back to his normal self. Description is somewhat non specific however cannot exclude ischemic event entirely therefore aspirin is appropriate. MRI pending. Neurological exam non focal, non lateralizing currently.      John Jean DO 11/13/2022 9:54 PM  Neurology

## 2022-11-13 NOTE — CONSULTS
Consult completed. Verbal consent obtained by patient. Nexiva 20g 1.75 inch peripheral IV initiated into left anterior forearm x 1 attempt with ultrasound guidance. Brisk blood return, flushes without resistance. Patient tolerated well. Primary nurse Jesse notified. Please consult IV/PICC team if patient's needs change, questions, or concerns.

## 2022-11-13 NOTE — PROGRESS NOTES
LOVENOX PROPHYLAXIS EVALUATION    Wt Readings from Last 3 Encounters:   11/12/22 218 lb 0.6 oz (98.9 kg)   11/10/22 241 lb (109.3 kg)   11/07/22 236 lb (107 kg)       Estimated Creatinine Clearance: 39 mL/min (A) (based on SCr of 1.9 mg/dL (H)).   Recent Labs     11/10/22  1110 11/12/22  1420   BUN  --  35*   CREATININE  --  1.9*    184   HGB 11.7* 12.4*   HCT 36.6* 37.9*   INR 1.02  --        Weight Range: .9 kg    CRCL = 30 or greater    50.9 kg   and below     .9  kg   101-150.9 kg   151-174.9  kg   175 kg  or greater     30mg subq  daily     40mg subq daily    (or 30mg subq BID orthopedic)     30mg subq  BID   40mg subq  BID   60mg subq BID       Per P/T protocol for appropriate subq anticoagulation by weight and CRCL change to:    Enoxparin 40mg subq daily      Maty Mckeon [facility]  8:04 PM  11/12/22

## 2022-11-14 LAB
ALBUMIN SERPL-MCNC: 3.3 GM/DL (ref 3.4–5)
ALP BLD-CCNC: 105 IU/L (ref 40–129)
ALT SERPL-CCNC: 30 U/L (ref 10–40)
AMMONIA: 28 UMOL/L (ref 16–60)
AMYLASE: 37 U/L (ref 25–115)
ANION GAP SERPL CALCULATED.3IONS-SCNC: 4 MMOL/L (ref 4–16)
APTT: 35 SECONDS (ref 25.1–37.1)
AST SERPL-CCNC: 56 IU/L (ref 15–37)
BASOPHILS ABSOLUTE: 0.1 K/CU MM
BASOPHILS RELATIVE PERCENT: 1.1 % (ref 0–1)
BILIRUB SERPL-MCNC: 0.7 MG/DL (ref 0–1)
BUN BLDV-MCNC: 29 MG/DL (ref 6–23)
CALCIUM SERPL-MCNC: 10.2 MG/DL (ref 8.3–10.6)
CHLORIDE BLD-SCNC: 97 MMOL/L (ref 99–110)
CO2: 34 MMOL/L (ref 21–32)
CREAT SERPL-MCNC: 1.8 MG/DL (ref 0.9–1.3)
CULTURE: ABNORMAL
CULTURE: ABNORMAL
DIFFERENTIAL TYPE: ABNORMAL
EOSINOPHILS ABSOLUTE: 0.4 K/CU MM
EOSINOPHILS RELATIVE PERCENT: 6.6 % (ref 0–3)
GFR SERPL CREATININE-BSD FRML MDRD: 39 ML/MIN/1.73M2
GLUCOSE BLD-MCNC: 119 MG/DL (ref 70–99)
GLUCOSE BLD-MCNC: 129 MG/DL (ref 70–99)
GLUCOSE BLD-MCNC: 134 MG/DL (ref 70–99)
HCT VFR BLD CALC: 38.3 % (ref 42–52)
HEMOGLOBIN: 12.3 GM/DL (ref 13.5–18)
IMMATURE NEUTROPHIL %: 0.3 % (ref 0–0.43)
INR BLD: 1.03 INDEX
LIPASE: 34 IU/L (ref 13–60)
LYMPHOCYTES ABSOLUTE: 0.8 K/CU MM
LYMPHOCYTES RELATIVE PERCENT: 12.4 % (ref 24–44)
Lab: ABNORMAL
MCH RBC QN AUTO: 33.2 PG (ref 27–31)
MCHC RBC AUTO-ENTMCNC: 32.1 % (ref 32–36)
MCV RBC AUTO: 103.2 FL (ref 78–100)
MONOCYTES ABSOLUTE: 0.7 K/CU MM
MONOCYTES RELATIVE PERCENT: 10.2 % (ref 0–4)
NUCLEATED RBC %: 0 %
PDW BLD-RTO: 15.4 % (ref 11.7–14.9)
PLATELET # BLD: 189 K/CU MM (ref 140–440)
PMV BLD AUTO: 10.5 FL (ref 7.5–11.1)
POTASSIUM SERPL-SCNC: 4.5 MMOL/L (ref 3.5–5.1)
PROTHROMBIN TIME: 13.3 SECONDS (ref 11.7–14.5)
RBC # BLD: 3.71 M/CU MM (ref 4.6–6.2)
SEGMENTED NEUTROPHILS ABSOLUTE COUNT: 4.5 K/CU MM
SEGMENTED NEUTROPHILS RELATIVE PERCENT: 69.4 % (ref 36–66)
SODIUM BLD-SCNC: 135 MMOL/L (ref 135–145)
SPECIMEN: ABNORMAL
TOTAL IMMATURE NEUTOROPHIL: 0.02 K/CU MM
TOTAL NUCLEATED RBC: 0 K/CU MM
TOTAL PROTEIN: 6.9 GM/DL (ref 6.4–8.2)
WBC # BLD: 6.5 K/CU MM (ref 4–10.5)

## 2022-11-14 PROCEDURE — 99232 SBSQ HOSP IP/OBS MODERATE 35: CPT | Performed by: NURSE PRACTITIONER

## 2022-11-14 PROCEDURE — 6360000002 HC RX W HCPCS: Performed by: INTERNAL MEDICINE

## 2022-11-14 PROCEDURE — 85025 COMPLETE CBC W/AUTO DIFF WBC: CPT

## 2022-11-14 PROCEDURE — 6370000000 HC RX 637 (ALT 250 FOR IP): Performed by: NURSE PRACTITIONER

## 2022-11-14 PROCEDURE — 83690 ASSAY OF LIPASE: CPT

## 2022-11-14 PROCEDURE — 6360000002 HC RX W HCPCS: Performed by: NURSE PRACTITIONER

## 2022-11-14 PROCEDURE — 94640 AIRWAY INHALATION TREATMENT: CPT

## 2022-11-14 PROCEDURE — 2580000003 HC RX 258: Performed by: NURSE PRACTITIONER

## 2022-11-14 PROCEDURE — 82962 GLUCOSE BLOOD TEST: CPT

## 2022-11-14 PROCEDURE — 85730 THROMBOPLASTIN TIME PARTIAL: CPT

## 2022-11-14 PROCEDURE — 94761 N-INVAS EAR/PLS OXIMETRY MLT: CPT

## 2022-11-14 PROCEDURE — 82150 ASSAY OF AMYLASE: CPT

## 2022-11-14 PROCEDURE — 80053 COMPREHEN METABOLIC PANEL: CPT

## 2022-11-14 PROCEDURE — 1200000000 HC SEMI PRIVATE

## 2022-11-14 PROCEDURE — 85610 PROTHROMBIN TIME: CPT

## 2022-11-14 PROCEDURE — 82140 ASSAY OF AMMONIA: CPT

## 2022-11-14 PROCEDURE — 36415 COLL VENOUS BLD VENIPUNCTURE: CPT

## 2022-11-14 RX ADMIN — LACTULOSE 30 G: 10 SOLUTION ORAL at 08:36

## 2022-11-14 RX ADMIN — SPIRONOLACTONE 100 MG: 50 TABLET ORAL at 21:24

## 2022-11-14 RX ADMIN — PANTOPRAZOLE SODIUM 40 MG: 40 TABLET, DELAYED RELEASE ORAL at 06:01

## 2022-11-14 RX ADMIN — FUROSEMIDE 40 MG: 40 TABLET ORAL at 08:45

## 2022-11-14 RX ADMIN — OMEGA-3-ACID ETHYL ESTERS 2 G: 1 CAPSULE, LIQUID FILLED ORAL at 08:35

## 2022-11-14 RX ADMIN — LEVOTHYROXINE SODIUM 50 MCG: 0.05 TABLET ORAL at 06:01

## 2022-11-14 RX ADMIN — NADOLOL 40 MG: 20 TABLET ORAL at 21:24

## 2022-11-14 RX ADMIN — ALBUTEROL SULFATE 2 PUFF: 90 AEROSOL, METERED RESPIRATORY (INHALATION) at 11:16

## 2022-11-14 RX ADMIN — FUROSEMIDE 40 MG: 40 TABLET ORAL at 18:22

## 2022-11-14 RX ADMIN — RIFAXIMIN 400 MG: 200 TABLET ORAL at 21:24

## 2022-11-14 RX ADMIN — SODIUM CHLORIDE, PRESERVATIVE FREE 10 ML: 5 INJECTION INTRAVENOUS at 21:24

## 2022-11-14 RX ADMIN — SODIUM CHLORIDE, PRESERVATIVE FREE 10 ML: 5 INJECTION INTRAVENOUS at 08:39

## 2022-11-14 RX ADMIN — ENOXAPARIN SODIUM 40 MG: 100 INJECTION SUBCUTANEOUS at 08:37

## 2022-11-14 RX ADMIN — SPIRONOLACTONE 100 MG: 50 TABLET ORAL at 08:35

## 2022-11-14 RX ADMIN — OCTREOTIDE ACETATE 100 MCG: 100 INJECTION, SOLUTION INTRAVENOUS; SUBCUTANEOUS at 06:03

## 2022-11-14 RX ADMIN — ALBUTEROL SULFATE 2 PUFF: 90 AEROSOL, METERED RESPIRATORY (INHALATION) at 21:00

## 2022-11-14 RX ADMIN — RIFAXIMIN 400 MG: 200 TABLET ORAL at 15:08

## 2022-11-14 RX ADMIN — ALBUTEROL SULFATE 2 PUFF: 90 AEROSOL, METERED RESPIRATORY (INHALATION) at 07:26

## 2022-11-14 RX ADMIN — OCTREOTIDE ACETATE 100 MCG: 100 INJECTION, SOLUTION INTRAVENOUS; SUBCUTANEOUS at 15:09

## 2022-11-14 RX ADMIN — DULOXETINE HYDROCHLORIDE 60 MG: 30 CAPSULE, DELAYED RELEASE ORAL at 08:34

## 2022-11-14 RX ADMIN — ASPIRIN 81 MG CHEWABLE TABLET 81 MG: 81 TABLET CHEWABLE at 08:34

## 2022-11-14 RX ADMIN — CEFTRIAXONE SODIUM 1000 MG: 1 INJECTION, POWDER, FOR SOLUTION INTRAMUSCULAR; INTRAVENOUS at 08:43

## 2022-11-14 RX ADMIN — RIFAXIMIN 400 MG: 200 TABLET ORAL at 08:38

## 2022-11-14 ASSESSMENT — PAIN SCALES - GENERAL
PAINLEVEL_OUTOF10: 0

## 2022-11-14 NOTE — PROGRESS NOTES
V2.0  [facility] Hospitalist Progress Note      Name:  Gladys Toro /Age/Sex: 1945  (78 y.o. male)   MRN & CSN:  [id number] & [id number] Encounter Date/Time: 2022 1:14 PM EST    Location:  [address] [place] [address]-A PCP: John Valle, [address] Day: 3    Assessment and Plan:   Gladys Toro is a 78 y.o. male with pmh of chronic kidney disease, cirrhosis of the liver with ascites, esophageal varices who presents with AMS (altered mental status)      Interval events:  -Briefly, patient is 65-year-old male with a forementioned history who presents via ambulance with complaints of altered mental status per charts. Most information obtained from family on admission. Patient's wife stated he woke up the morning of admission  and felt very confused. He went to make a cup of coffee and did not know how to do that. Patient's family member stated he did have paracentesis 1 day before hospitalization. Denies any fever/chills/pain. Denies any exacerbating factors. She noticed patient's face to be flushed. At the time of examination patient was AAO X1 only able to tell his name. Patient was seeing a lot of things which do not make any sense although grandma/vocabulary/speech seemed appropriate in terms of rate/rhythm and volume, the content was out of sync with the situation. Vital signs upon arrival with blood pressure 96/57 mmHg, rest vital signs stable off oxygen. Labs with BUN 35/creatinine 1.9, GFR 37, ammonia 66, albumin 3.3, AST 40 bilirubin 0.9 and ALT 23. Urine drug screen remarkable. Urinalysis with moderate leukocyte esterase, positive nitrate and 11 WBC per high-power field. CT head with no acute abnormalities. Chest x-ray with\"Redemonstration of large right-sided pleural effusion and opacity of the right lung, appearing grossly similar to the prior study\". Patient underwent IR guided thoracentesis.   Sliding scale and paracentesis 10/11.  --patient seen and examined at bedside, vital signs stable with blood pressure improved without intervention, patient comfortable off oxygen and currently AAO X1. Labs with no leukocytosis, ammonia uptrending 83<66, GI and renal service consulted. Infectious work-up with blood cultures/urine cultures with no growth reported so far. -11/14-patient seen and examined at bedside, SALOME CAMPOVERDE. Patient's mental status remarkably improved, patient is hard of hearing but appeared AAO X3 today. Labs with bicarb 34<31<29, BUN 29<32/creatinine 1.8<1.7<1.9, ammonia level downtrending to 28, AST 56<40, ALT 30. Nephrology on board    Plan:   Altered mental status:  Possible hepatic Encephalopathy:  Acute uncomplicated urinary tract infection:  -Patient admitted to [place] [address] precautions/delirium precautions  -Ammonia level 83<66  -Lactulose 30 g 3 times daily  -We will continue home Haldol, rifaximin  -Neurochecks every 6 hours for 24 hours  -CT head negative for acute intracranial abnormalities, unable to complete CTA due to GFR  -No leukocytosis, vital signs stable  -Patient initiated on Rocephin in ED, continued on admission  -Hold all sedating medications  -MRI brain without contrast-pending  -GI consulted, will appreciate recs  -Neurology consult on admission for altered mental status-likely suspecting toxic metabolic encephalopathy superimposed on acute cystitis, low suspicion for GCA or seizure, started patient with aspirin 81 mg p.o. daily  -Follow fever/WBC curve  -Follow infectious work-up  -Monitor mental status improvement after antibiotic uncooperation  Recurrent ascites:  History of right-sided pleural effusion:  -Multiple prior paracentesis, multiple admissions status post paracentesis 11/11 and thoracentesis 11/10  -Continue home medications  -GI consult  Alcoholic liver disease:  Portal hypertension:   History of esophageal varices:  History of hepatic encephalopathy:  -Continue home medication nadolol, spironolactone and Lasix  -Continue rifaximin and lactulose  -No abdominal pain reported  -GI consult  CKD 3:  -Baseline creatinine 1.2-1.3  -Strict intake/output record  -Avoid nephrotoxic medications  -Creatinine 1.9, will have renal service on board  HTN:  -Continue home medications  Hypothyroidism:  -Continue Synthyroid  History of alcohol abuse:  -Monitor for withdrawal      Diet ADULT DIET; Regular; Low Sodium (2 gm); 2000 ml   DVT Prophylaxis X Lovenox, Heparin, SCDs, ambulation, Eliquis, Xarelto Coumadin   Code Status Full Code   Disposition From: Home with wife  Expected Disposition: TBD  Estimated Date of Discharge: 2 days  Patient requires continued admission due to currently having altered mental status undergoing evaluation   Surrogate Decision Maker/ POA      Subjective:     Chief Complaint: Altered Mental Status       Annie Michelle is a 78 y.o. male who presents with altered mental status     Patient denies any active complaints, however AAO X1    Review of Systems:    Review of Systems    Unable to perform a reliable review of system given patient was a social history and he is currently AAO X1. We will try to call the family for further information    Objective: Intake/Output Summary (Last 24 hours) at 11/14/2022 1311  Last data filed at 11/14/2022 1237  Gross per 24 hour   Intake 1100 ml   Output 775 ml   Net 325 ml          Vitals:   Vitals:    11/14/22 1237   BP:    Pulse: 86   Resp: 19   Temp:    SpO2:        Physical Exam:     General: NAD  Eyes: EOMI  ENT: neck supple  Cardiovascular: Regular rate. Respiratory: Clear to auscultation  Gastrointestinal: Soft, non tender  Genitourinary: no suprapubic tenderness  Musculoskeletal: No edema  Skin: warm, dry  Neuro: Alert. Psych: Mood appropriate.      Medications:   Medications:    aspirin  81 mg Oral Daily    midodrine  5 mg Oral TID     octreotide  100 mcg SubCUTAneous Q8H    albuterol sulfate HFA  2 puff Inhalation 4x daily    DULoxetine  60 mg Oral Daily    furosemide  40 mg Oral BID    levothyroxine  50 mcg Oral Daily    nadolol  40 mg Oral Nightly    spironolactone  100 mg Oral BID    sodium chloride flush  5-40 mL IntraVENous 2 times per day    cefTRIAXone (ROCEPHIN) IV  1,000 mg IntraVENous Q24H    lactulose  30 g Oral TID    enoxaparin  40 mg SubCUTAneous Daily    pantoprazole  40 mg Oral QAM AC    rifAXIMin  400 mg Oral TID    omega-3 acid ethyl esters  2 g Oral Daily      Infusions:    sodium chloride       PRN Meds: sodium chloride flush, 5-40 mL, PRN  sodium chloride, 25 mL, PRN  ondansetron, 4 mg, Q8H PRN   Or  ondansetron, 4 mg, Q6H PRN  polyethylene glycol, 17 g, Daily PRN      Labs      Recent Results (from the past 24 hour(s))   TSH without Reflex    Collection Time: 11/13/22  9:55 PM   Result Value Ref Range    TSH, High Sensitivity 0.534 0.270 - 4.20 uIu/ml   T4, free    Collection Time: 11/13/22  9:55 PM   Result Value Ref Range    T4 Free 0.98 0.9 - 1.8 NG/DL   Protime/INR & PTT    Collection Time: 11/13/22  9:55 PM   Result Value Ref Range    Protime 13.0 11.7 - 14.5 SECONDS    INR 1.01 INDEX    aPTT 36.5 25.1 - 37.1 SECONDS   Amylase    Collection Time: 11/14/22  8:12 AM   Result Value Ref Range    Amylase 37 25 - 115 U/L   CBC with Auto Differential    Collection Time: 11/14/22  8:12 AM   Result Value Ref Range    WBC 6.5 4.0 - 10.5 K/CU MM    RBC 3.71 (L) 4.6 - 6.2 M/CU MM    Hemoglobin 12.3 (L) 13.5 - 18.0 GM/DL    Hematocrit 38.3 (L) 42 - 52 %    .2 (H) 78 - 100 FL    MCH 33.2 (H) 27 - 31 PG    MCHC 32.1 32.0 - 36.0 %    RDW 15.4 (H) 11.7 - 14.9 %    Platelets 645 009 - 949 K/CU MM    MPV 10.5 7.5 - 11.1 FL    Differential Type AUTOMATED DIFFERENTIAL     Segs Relative 69.4 (H) 36 - 66 %    Lymphocytes % 12.4 (L) 24 - 44 %    Monocytes % 10.2 (H) 0 - 4 %    Eosinophils % 6.6 (H) 0 - 3 %    Basophils % 1.1 (H) 0 - 1 %    Segs Absolute 4.5 K/CU MM    Lymphocytes Absolute 0.8 K/CU MM    Monocytes Absolute 0.7 K/CU MM    Eosinophils Absolute 0.4 K/CU MM    Basophils Absolute 0.1 K/CU MM    Nucleated RBC % 0.0 %    Total Nucleated RBC 0.0 K/CU MM    Total Immature Neutrophil 0.02 K/CU MM    Immature Neutrophil % 0.3 0 - 0.43 %   Comprehensive Metabolic Panel    Collection Time: 11/14/22  8:12 AM   Result Value Ref Range    Sodium 135 135 - 145 MMOL/L    Potassium 4.5 3.5 - 5.1 MMOL/L    Chloride 97 (L) 99 - 110 mMol/L    CO2 34 (H) 21 - 32 MMOL/L    BUN 29 (H) 6 - 23 MG/DL    Creatinine 1.8 (H) 0.9 - 1.3 MG/DL    Est, Glom Filt Rate 39 (L) >60 mL/min/1.73m2    Glucose 134 (H) 70 - 99 MG/DL    Calcium 10.2 8.3 - 10.6 MG/DL    Albumin 3.3 (L) 3.4 - 5.0 GM/DL    Total Protein 6.9 6.4 - 8.2 GM/DL    Total Bilirubin 0.7 0.0 - 1.0 MG/DL    ALT 30 10 - 40 U/L    AST 56 (H) 15 - 37 IU/L    Alkaline Phosphatase 105 40 - 129 IU/L    Anion Gap 4 4 - 16   Lipase    Collection Time: 11/14/22  8:12 AM   Result Value Ref Range    Lipase 34 13 - 60 IU/L   Protime/INR & PTT    Collection Time: 11/14/22  8:12 AM   Result Value Ref Range    Protime 13.3 11.7 - 14.5 SECONDS    INR 1.03 INDEX    aPTT 35.0 25.1 - 37.1 SECONDS   Ammonia    Collection Time: 11/14/22  8:12 AM   Result Value Ref Range    Ammonia 28 16 - 60 UMOL/L   POCT Glucose    Collection Time: 11/14/22  8:33 AM   Result Value Ref Range    POC Glucose 119 (H) 70 - 99 MG/DL   POCT Glucose    Collection Time: 11/14/22 11:51 AM   Result Value Ref Range    POC Glucose 129 (H) 70 - 99 MG/DL        Imaging/Diagnostics Last 24 Hours   CT HEAD WO CONTRAST    Result Date: 11/12/2022  EXAMINATION: CT OF THE HEAD WITHOUT CONTRAST  11/12/2022 1:46 pm TECHNIQUE: CT of the head was performed without the administration of intravenous contrast. Automated exposure control, iterative reconstruction, and/or weight based adjustment of the mA/kV was utilized to reduce the radiation dose to as low as reasonably achievable.  COMPARISON: 05/10/2022 HISTORY: ORDERING SYSTEM PROVIDED HISTORY: ams TECHNOLOGIST PROVIDED HISTORY: Reason for exam:->ams Has a \"code stroke\" or \"stroke alert\" been called? ->No Decision Support Exception - unselect if not a suspected or confirmed emergency medical condition->Emergency Medical Condition (MA) Reason for Exam: AMS FINDINGS: BRAIN/VENTRICLES: The ventricles and sulci are diffusely enlarged. Low attenuation is seen in the periventricular and subcortical white matter. No acute intracranial hemorrhage or acute infarct is identified. ORBITS: The visualized portion of the orbits demonstrate no acute abnormality. SINUSES: The visualized paranasal sinuses and mastoid air cells demonstrate no acute abnormality. SOFT TISSUES/SKULL:  No acute abnormality of the visualized skull or soft tissues. No acute intracranial abnormality. XR CHEST PORTABLE    Result Date: 11/12/2022  EXAMINATION: ONE XRAY VIEW OF THE CHEST 11/12/2022 12:57 pm COMPARISON: 11/10/2022. HISTORY: ORDERING SYSTEM PROVIDED HISTORY: ams TECHNOLOGIST PROVIDED HISTORY: Reason for exam:->ams Reason for Exam: ams FINDINGS: The cardiac silhouette is indeterminate. There is redemonstration large right-sided pleural effusion with opacity of the right lung with relative sparing of right lung apex. The left lung appears clear. Redemonstration of large right-sided pleural effusion and opacity of the right lung, appearing grossly similar to the prior study.        Electronically signed by Pretty Cooley MD on 11/14/2022 at 1:11 PM

## 2022-11-14 NOTE — CARE COORDINATION
SW Student attempted to speak to pt, but pt seemed confused and didn't understand what student was meaning. SW Student attempted to call wife's number twice, but the phone didn't even ring. CM will follow and try again later.

## 2022-11-15 ENCOUNTER — TELEPHONE (OUTPATIENT)
Dept: FAMILY MEDICINE CLINIC | Age: 77
End: 2022-11-15

## 2022-11-15 VITALS
SYSTOLIC BLOOD PRESSURE: 131 MMHG | HEART RATE: 77 BPM | TEMPERATURE: 98.6 F | OXYGEN SATURATION: 95 % | WEIGHT: 218.92 LBS | HEIGHT: 68 IN | RESPIRATION RATE: 17 BRPM | BODY MASS INDEX: 33.18 KG/M2 | DIASTOLIC BLOOD PRESSURE: 61 MMHG

## 2022-11-15 LAB
ANION GAP SERPL CALCULATED.3IONS-SCNC: 10 MMOL/L (ref 4–16)
BUN BLDV-MCNC: 27 MG/DL (ref 6–23)
CALCIUM SERPL-MCNC: 9.7 MG/DL (ref 8.3–10.6)
CHLORIDE BLD-SCNC: 97 MMOL/L (ref 99–110)
CO2: 32 MMOL/L (ref 21–32)
CREAT SERPL-MCNC: 1.7 MG/DL (ref 0.9–1.3)
GFR SERPL CREATININE-BSD FRML MDRD: 42 ML/MIN/1.73M2
GLUCOSE BLD-MCNC: 111 MG/DL (ref 70–99)
POTASSIUM SERPL-SCNC: 4.3 MMOL/L (ref 3.5–5.1)
SODIUM BLD-SCNC: 139 MMOL/L (ref 135–145)

## 2022-11-15 PROCEDURE — 6370000000 HC RX 637 (ALT 250 FOR IP): Performed by: NURSE PRACTITIONER

## 2022-11-15 PROCEDURE — 80048 BASIC METABOLIC PNL TOTAL CA: CPT

## 2022-11-15 PROCEDURE — 2580000003 HC RX 258: Performed by: NURSE PRACTITIONER

## 2022-11-15 PROCEDURE — 6360000002 HC RX W HCPCS: Performed by: NURSE PRACTITIONER

## 2022-11-15 PROCEDURE — 94761 N-INVAS EAR/PLS OXIMETRY MLT: CPT

## 2022-11-15 PROCEDURE — 6370000000 HC RX 637 (ALT 250 FOR IP): Performed by: INTERNAL MEDICINE

## 2022-11-15 PROCEDURE — 6370000000 HC RX 637 (ALT 250 FOR IP): Performed by: FAMILY MEDICINE

## 2022-11-15 PROCEDURE — 94640 AIRWAY INHALATION TREATMENT: CPT

## 2022-11-15 PROCEDURE — 36415 COLL VENOUS BLD VENIPUNCTURE: CPT

## 2022-11-15 RX ORDER — FUROSEMIDE 40 MG/1
40 TABLET ORAL DAILY
Qty: 60 TABLET | Refills: 3 | Status: SHIPPED | OUTPATIENT
Start: 2022-11-15 | End: 2022-11-22 | Stop reason: SDUPTHER

## 2022-11-15 RX ORDER — FUROSEMIDE 40 MG/1
40 TABLET ORAL 2 TIMES DAILY
Status: DISCONTINUED | OUTPATIENT
Start: 2022-11-15 | End: 2022-11-15 | Stop reason: HOSPADM

## 2022-11-15 RX ORDER — LANOLIN ALCOHOL/MO/W.PET/CERES
6 CREAM (GRAM) TOPICAL ONCE
Status: COMPLETED | OUTPATIENT
Start: 2022-11-15 | End: 2022-11-15

## 2022-11-15 RX ORDER — FUROSEMIDE 40 MG/1
40 TABLET ORAL DAILY
Qty: 60 TABLET | Refills: 3 | Status: SHIPPED | OUTPATIENT
Start: 2022-11-15 | End: 2022-11-15 | Stop reason: SDUPTHER

## 2022-11-15 RX ORDER — ZIPRASIDONE MESYLATE 20 MG/ML
20 INJECTION, POWDER, LYOPHILIZED, FOR SOLUTION INTRAMUSCULAR ONCE
Status: DISCONTINUED | OUTPATIENT
Start: 2022-11-15 | End: 2022-11-15 | Stop reason: HOSPADM

## 2022-11-15 RX ADMIN — ENOXAPARIN SODIUM 40 MG: 100 INJECTION SUBCUTANEOUS at 08:35

## 2022-11-15 RX ADMIN — LEVOTHYROXINE SODIUM 50 MCG: 0.05 TABLET ORAL at 05:29

## 2022-11-15 RX ADMIN — FUROSEMIDE 40 MG: 40 TABLET ORAL at 08:34

## 2022-11-15 RX ADMIN — OMEGA-3-ACID ETHYL ESTERS 2 G: 1 CAPSULE, LIQUID FILLED ORAL at 08:34

## 2022-11-15 RX ADMIN — MELATONIN TAB 3 MG 6 MG: 3 TAB at 02:48

## 2022-11-15 RX ADMIN — SPIRONOLACTONE 100 MG: 50 TABLET ORAL at 08:34

## 2022-11-15 RX ADMIN — PANTOPRAZOLE SODIUM 40 MG: 40 TABLET, DELAYED RELEASE ORAL at 05:29

## 2022-11-15 RX ADMIN — CEFTRIAXONE SODIUM 1000 MG: 1 INJECTION, POWDER, FOR SOLUTION INTRAMUSCULAR; INTRAVENOUS at 08:40

## 2022-11-15 RX ADMIN — DULOXETINE HYDROCHLORIDE 60 MG: 30 CAPSULE, DELAYED RELEASE ORAL at 08:34

## 2022-11-15 RX ADMIN — RIFAXIMIN 400 MG: 200 TABLET ORAL at 08:34

## 2022-11-15 RX ADMIN — ALBUTEROL SULFATE 2 PUFF: 90 AEROSOL, METERED RESPIRATORY (INHALATION) at 11:39

## 2022-11-15 RX ADMIN — SODIUM CHLORIDE, PRESERVATIVE FREE 10 ML: 5 INJECTION INTRAVENOUS at 08:35

## 2022-11-15 RX ADMIN — ALBUTEROL SULFATE 2 PUFF: 90 AEROSOL, METERED RESPIRATORY (INHALATION) at 08:35

## 2022-11-15 RX ADMIN — ASPIRIN 81 MG CHEWABLE TABLET 81 MG: 81 TABLET CHEWABLE at 08:34

## 2022-11-15 NOTE — DISCHARGE INSTRUCTIONS
Cirrhosis: Care Instructions  Overview     Cirrhosis occurs when healthy tissue in your liver gets scarred. This keeps the liver from working well. It usually happens after a liver has been inflamed for years. Cirrhosis is most often caused by alcohol use disorder or hepatitis infection. But there are other causes too. These include medicines and too much fat in the liver. Conditions passed down in families and other disorders can also cause it. In some cases, no cause can be found. Treatment can't completely fix liver damage. But you may be able to slow or prevent more damage if you don't drink alcohol or take medicines, drugs, or supplements that harm your liver. Follow-up care is a key part of your treatment and safety. Be sure to make and go to all appointments, and call your doctor if you are having problems. It's also a good idea to know your test results and keep a list of the medicines you take. How can you care for yourself at home? Do not drink any alcohol. It can harm your liver. Talk to your doctor if you need help to stop drinking. Be safe with medicines. Take your medicines exactly as prescribed. Call your doctor if you think you are having a problem with your medicine. Talk to your doctor before you take any other medicines. These include over-the-counter medicines and herbal products. Be careful taking acetaminophen (Tylenol), ibuprofen (Advil, Motrin), or naproxen (Aleve). These can sometimes cause more liver damage. Talk with your doctor if you're not sure which medicines are safe. If your cirrhosis causes extra fluid to build up in your body, try not to eat a lot of salt. Use less salt when you cook and at the table. Don't eat fast foods or snack foods with a lot of salt. Extra fluid in your belly, legs, and chest can cause serious problems. Work with your doctor or a dietitian to be sure you eat the right amount of carbohydrate, protein, fat, and sodium (salt).  It's very important to choose the best foods for the health of your liver. If your doctor recommends it, limit how much fluid you drink. If your doctor recommends it, get more exercise. Walking is a good choice. Bit by bit, increase the amount you walk every day. Try for at least 30 minutes on most days of the week. You also may want to swim, bike, or do other activities. When should you call for help? Call 911 anytime you think you may need emergency care. For example, call if:    You have trouble breathing. You vomit blood or what looks like coffee grounds. Call your doctor now or seek immediate medical care if:    You feel very sleepy or confused. You have new or worse belly pain. You have a fever. There is a new or increasing yellow tint to your skin or the whites of your eyes. You have any abnormal bleeding, such as:  Nosebleeds. Vaginal bleeding that is different (heavier, more frequent, at a different time of the month) than what you are used to. Bloody or black stools, or rectal bleeding. Bloody or pink urine. Watch closely for changes in your health, and be sure to contact your doctor if:    You have any problems. Your belly is getting bigger. You are gaining weight. Where can you learn more? Go to https://[employer]. org and sign in to your [employer] account. Enter M412 in the [employer] box to learn more about \"Cirrhosis: Care Instructions. \"     If you do not have an account, please click on the \"Sign Up Now\" link. Current as of: June 6, 2022               Content Version: 13.4  © 2006-2022 Healthwise, Incorporated. Care instructions adapted under license by [facility] [employer] [facility] ([facility]). If you have questions about a medical condition or this instruction, always ask your healthcare professional. [place] [address] any warranty or liability for your use of this information.          Urinary Tract Infections (UTI) in Men: Care Instructions  Overview A urinary tract infection, or UTI, is a term for an infection anywhere between the kidneys and the urethra. (The urethra is the tube that carries urine from the bladder to outside the body.) Most UTIs are bladder infections. They often cause pain or burning when you urinate. UTIs are caused by bacteria. This means they can be cured with antibiotics. Be sure to complete your treatment so that the infection does not get worse. Follow-up care is a key part of your treatment and safety. Be sure to make and go to all appointments, and call your doctor if you are having problems. It's also a good idea to know your test results and keep a list of the medicines you take. How can you care for yourself at home? Take your antibiotics as prescribed. Do not stop taking them just because you feel better. You need to take the full course of antibiotics. Take your medicines exactly as prescribed. Your doctor may have prescribed a medicine, such as phenazopyridine (Pyridium), to help relieve pain when you urinate. This turns your urine orange. You may stop taking it when your symptoms get better. But be sure to take all of your antibiotics, which treat the infection. Drink extra water for the next day or two. This will help make the urine less concentrated and help wash out the bacteria causing the infection. (If you have kidney, heart, or liver disease and have to limit your fluids, talk with your doctor before you increase your fluid intake.)  Avoid drinks that are carbonated or have caffeine. They can irritate the bladder. Urinate often. Try to empty your bladder each time. To relieve pain, take a hot bath or lay a heating pad (set on low) over your lower belly or genital area. Never go to sleep with a heating pad in place. To help prevent UTIs  Drink plenty of fluids. If you have kidney, heart, or liver disease and have to limit fluids, talk with your doctor before you increase the amount of fluids you drink.   Urinate when you have the urge. Do not hold your urine for a long time. Urinate before you go to sleep. Keep your penis clean. Catheter care  If you have a drainage tube (catheter) in place, the following steps will help you care for it. Always wash your hands before and after touching your catheter. Check the area around the urethra for inflammation or signs of infection. Signs of infection include irritated, swollen, red, or tender skin, or pus around the catheter. Clean the area around the catheter with soap and water two times a day. Dry with a clean towel afterward. Do not apply powder or lotion to the skin around the catheter. To empty the urine collection bag   Wash your hands with soap and water. Without touching the drain spout, remove the spout from its sleeve at the bottom of the collection bag. Open the valve on the spout. Let the urine flow out of the bag and into the toilet or a container. Do not let the tubing or drain spout touch anything. After you empty the bag, clean the end of the drain spout with tissue and water. Close the valve and put the drain spout back into its sleeve at the bottom of the collection bag. Wash your hands with soap and water. When should you call for help? Call your doctor now or seek immediate medical care if:    Symptoms such as a fever, chills, nausea, or vomiting get worse or happen for the first time. You have new pain in your back just below your rib cage. This is called flank pain. There is new blood or pus in your urine. You are not able to take or keep down your antibiotics. Watch closely for changes in your health, and be sure to contact your doctor if:    You are not getting better after taking an antibiotic for 2 days. Your symptoms go away but then come back. Where can you learn more? Go to https://ch[employer]meet.[employer]. org and sign in to your [employer] account.  Enter Z589 in the [employer] box to learn more about \"Urinary Tract Infections (UTI) in Men: Care Instructions. \"     If you do not have an account, please click on the \"Sign Up Now\" link. Current as of: June 16, 2022               Content Version: 13.4  © 3120-4430 Healthwise, Incorporated. Care instructions adapted under license by [facility] ([facility]). If you have questions about a medical condition or this instruction, always ask your healthcare professional. [address] any warranty or liability for your use of this information.

## 2022-11-15 NOTE — DISCHARGE INSTR - DIET
Good nutrition is important when healing from an illness, injury, or surgery. Follow any nutrition recommendations given to you during your hospital stay. If you were given an oral nutrition supplement while in the hospital, continue to take this supplement at home. You can take it with meals, in-between meals, and/or before bedtime. These supplements can be purchased at most local grocery stores, pharmacies, and chain [employer]-stores. If you have any questions about your diet or nutrition, call the hospital and ask for the dietitian.     RESUME PRE-ADMISSION DIET AS TOLERATED

## 2022-11-15 NOTE — FLOWSHEET NOTE
Patient/spouse given discharge instructions, voices understanding. Spouse had just refilled patients Rifaximin and Lasix for home. Denies questions/concerns. Saline lock removed. Patient dressing, ready for discharge.

## 2022-11-15 NOTE — PLAN OF CARE
Problem: Discharge Planning  Goal: Discharge to home or other facility with appropriate resources  Outcome: Progressing     Problem: Pain  Goal: Verbalizes/displays adequate comfort level or baseline comfort level  Outcome: Progressing     Problem: Safety - Adult  Goal: Free from fall injury  Outcome: Progressing     Problem: Neurosensory - Adult  Goal: Achieves stable or improved neurological status  Outcome: Progressing  Goal: Achieves maximal functionality and self care  Outcome: Progressing     Problem: Respiratory - Adult  Goal: Achieves optimal ventilation and oxygenation  Outcome: Progressing     Problem: Cardiovascular - Adult  Goal: Maintains optimal cardiac output and hemodynamic stability  Outcome: Progressing  Goal: Absence of cardiac dysrhythmias or at baseline  Outcome: Progressing     Problem: Skin/Tissue Integrity - Adult  Goal: Skin integrity remains intact  Outcome: Progressing     Problem: Musculoskeletal - Adult  Goal: Return mobility to safest level of function  Outcome: Progressing  Goal: Maintain proper alignment of affected body part  Outcome: Progressing  Goal: Return ADL status to a safe level of function  Outcome: Progressing     Problem: Gastrointestinal - Adult  Goal: Minimal or absence of nausea and vomiting  Outcome: Progressing  Goal: Maintains or returns to baseline bowel function  Outcome: Progressing  Goal: Maintains adequate nutritional intake  Outcome: Progressing     Problem: Infection - Adult  Goal: Absence of infection at discharge  Outcome: Progressing  Goal: Absence of infection during hospitalization  Outcome: Progressing     Problem: Metabolic/Fluid and Electrolytes - Adult  Goal: Electrolytes maintained within normal limits  Outcome: Progressing     Problem: Hematologic - Adult  Goal: Maintains hematologic stability  Outcome: Progressing     Problem: Anxiety  Goal: Will report anxiety at manageable levels  Description: INTERVENTIONS:  1.  Administer medication as ordered  2. Teach and rehearse alternative coping skills  3. Provide emotional support with 1:1 interaction with staff  Outcome: Progressing     Problem: Coping  Goal: Pt/Family able to verbalize concerns and demonstrate effective coping strategies  Description: INTERVENTIONS:  1. Assist patient/family to identify coping skills, available support systems and cultural and spiritual values  2. Provide emotional support, including active listening and acknowledgement of concerns of patient and caregivers  3. Reduce environmental stimuli, as able  4. Instruct patient/family in relaxation techniques, as appropriate  5. Assess for spiritual pain/suffering and initiate Spiritual Care, Psychosocial Clinical Specialist consults as needed  Outcome: Progressing     Problem: Decision Making  Goal: Pt/Family able to effectively weigh alternatives and participate in decision making related to treatment and care  Description: INTERVENTIONS:  1. Determine when there are differences between patient's view, family's view, and healthcare provider's view of condition  2. Facilitate patient and family articulation of goals for care  3. Help patient and family identify pros/cons of alternative solutions  4. Provide information as requested by patient/family  5. Respect patient/family right to receive or not to receive information  6. Serve as a liaison between patient and family and health care team  7. Initiate Consults from Ethics, Palliative Care or initiate [address] as is appropriate  Outcome: Progressing     Problem: Confusion  Goal: Confusion, delirium, dementia, or psychosis is improved or at baseline  Description: INTERVENTIONS:  1. Assess for possible contributors to thought disturbance, including medications, impaired vision or hearing, underlying metabolic abnormalities, dehydration, psychiatric diagnoses, and notify attending LIP  2. [place] high risk fall precautions, as indicated  3.  Provide frequent short contacts to provide reality reorientation, refocusing and direction  4. Decrease environmental stimuli, including noise as appropriate  5. Monitor and intervene to maintain adequate nutrition, hydration, elimination, sleep and activity  6. If unable to ensure safety without constant attention obtain sitter and review sitter guidelines with assigned personnel  7. Initiate Psychosocial CNS and Spiritual Care consult, as indicated  Outcome: Progressing     Problem: Behavior  Goal: Pt/Family maintain appropriate behavior and adhere to behavioral management agreement, if implemented  Description: INTERVENTIONS:  1. Assess patient/family's coping skills and  non-compliant behavior (including use of illegal substances)  2. Notify security of behavior or suspected illegal substances which indicate the need for search of the family and/or belongings  3. Encourage verbalization of thoughts and concerns in a socially appropriate manner  4. Utilize positive, consistent limit setting strategies supporting safety of patient, staff and others  5. Encourage participation in the decision making process about the behavioral management agreement  6. If a visitor's behavior poses a threat to safety call refer to organization policy.   7. Initiate consult with , Psychosocial CNS, Spiritual Care as appropriate  Outcome: Progressing

## 2022-11-15 NOTE — PROGRESS NOTES
Outpatient Pharmacy Progress Note for Meds-to-Beds    Total number of Prescriptions Filled: 1  The following prescription(s) were refilled to soon per insurance (patient has some at home): Furosemide   The prescriptions for Xifaxan was filled at [facility] on 11/14/2022. It is ready to be picked-up by patient. He has been getting this Rx at [facility] for the past few months. Thank you for letting us serve your patients.   [facility]    [address] [facility]    Phone: [phone]    Fax: [phone]

## 2022-11-15 NOTE — CARE COORDINATION
LSW spoke with pt and his wife regarding discharge plans. Pt lives with is wife in a 1 story home. Pt has a walker at home but only uses it when he is out of the home. Pt and wife stated pt is independent of his ADLs. Pt has a PCP and has insurance to help with healthcare cost.  Pt has had [address] [facility] in that past.  Pt and wife are not interested and [facility] at [facility]. Pt and wife  plans for pt to return home once stable for discharge. CM available if needed.

## 2022-11-15 NOTE — DISCHARGE SUMMARY
V2.0  Discharge Summary    Name:  Ulices Ruiz /Age/Sex: 1945 (59 y.o. male)   Admit Date: 2022  Discharge Date: 11/15/22    MRN & CSN:  [id number] & [id number] Encounter Date and Time 11/15/22 11:58 AM EST    Attending:  Meng Salmon MD Discharging Provider: Meng Salmon MD       Hospital Course:     Brief HPI and Hospital course: Ulices Ruiz is a 78 y.o. male with pmh of chronic kidney disease, cirrhosis of the liver with ascites, esophageal varices who presents with AMS (altered mental status). Suspected secondary to hyperammonemia and UTI, CT head negative for acute intracranial abnormalities, CTA not done as GFR low, UA showed possible UTI, treated with ceftriaxone, lactulose and rifaximin consulted neurology, GI, as patient had recurrent ascites with right-sided pleural effusion, patient had paracentesis, neurochecks done every 4 hours, also consulted nephrology to rule out hepatorenal syndrome. As patient had CANDIE on CKD treated with midodrine, octreotide, albumin and Lasix, nephrology recommended patient getting tapped for pleural effusions or ascites rather than high-dose diuretics as patient ending up in volume depletion and CANDIE, patient improved significantly discharged in a stable condition to follow-up with PCP, GI and nephrology also started on Lasix from tomorrow. The patient expressed appropriate understanding of, and agreement with the discharge recommendations, medications, and plan.      Consults this admission:  IP CONSULT TO GI  IP CONSULT TO NEUROLOGY  IP CONSULT TO NEPHROLOGY  IP CONSULT TO IV TEAM  IP CONSULT TO GI    Discharge Diagnosis:   AMS (altered mental status)  Hepatic encephalopathy  UTI  Recurrent ascites  Right-sided pleural effusion    Discharge Instruction:   Follow up appointments: PCP, nephrology and GI  Primary care physician: Kerrie Jacobs MD within 2 weeks  Diet: regular diet   Activity: activity as tolerated  Disposition: Discharged to:   [x]Home, [][facility], []SNF, []Acute Rehab, []Hospice   Condition on discharge: Stable  Labs and Tests to be Followed up as an outpatient by PCP or Specialist:     Discharge Medications:        Medication List        CHANGE how you take these medications      nadolol 40 MG tablet  Commonly known as: CORGARD  Take 1 tablet by mouth in the morning.   What changed: when to take this            CONTINUE taking these medications      albuterol sulfate  (90 Base) MCG/ACT inhaler  Commonly known as: PROVENTIL;VENTOLIN;PROAIR  INHALE 2 PUFFS BY MOUTH 4 TIMES DAILY AS NEEDED FOR WHEEZING     betamethasone valerate 0.1 % lotion  Commonly known as: VALISONE  Apply topically 2 times daily prn scalp rash     DULoxetine 60 MG extended release capsule  Commonly known as: CYMBALTA  TAKE 1 CAPSULE BY MOUTH ONCE DAILY     ESOMEPRAZOLE MAGNESIUM PO     lactulose 10 GM/15ML solution  Commonly known as: CHRONULAC  Take 30 mLs by mouth 3 times daily Hold if having diarrhea     levothyroxine 50 MCG tablet  Commonly known as: SYNTHROID  Take 1 tablet by mouth Daily     MULTI VITAMIN MENS PO     Omega-3 1000 MG Caps     rifAXIMin 550 MG tablet  Commonly known as: XIFAXAN  Take 1 tablet by mouth 2 times daily     spironolactone 100 MG tablet  Commonly known as: ALDACTONE            STOP taking these medications      furosemide 40 MG tablet  Commonly known as: LASIX     ondansetron 4 MG disintegrating tablet  Commonly known as: ZOFRAN-ODT               Where to Get Your Medications        These medications were sent to [address] [place] [address]      Phone: [phone]   rifAXIMin 550 MG tablet        Objective Findings at Discharge:   /61   Pulse 77   Temp 98.6 °F (37 °C) (Oral)   Resp 17   Ht 5' 8\" (1.727 m)   Wt 218 lb 14.7 oz (99.3 kg)   SpO2 95%   BMI 33.29 kg/m²       Physical Exam:   General: Febrile, no distress sitting on the bedside in the chair comfortably  Eyes: EOMI  ENT: neck supple no JVD  Cardiovascular: Regular rate. Respiratory: Clear to auscultation  Gastrointestinal: Soft, non tender  Genitourinary: no suprapubic tenderness  Musculoskeletal: No edema  Skin: warm, dry  Neuro: Alert. Psych: Mood appropriate. Labs and Imaging   CT HEAD WO CONTRAST    Result Date: 11/12/2022  EXAMINATION: CT OF THE HEAD WITHOUT CONTRAST  11/12/2022 1:46 pm TECHNIQUE: CT of the head was performed without the administration of intravenous contrast. Automated exposure control, iterative reconstruction, and/or weight based adjustment of the mA/kV was utilized to reduce the radiation dose to as low as reasonably achievable. COMPARISON: 05/10/2022 HISTORY: ORDERING SYSTEM PROVIDED HISTORY: ams TECHNOLOGIST PROVIDED HISTORY: Reason for exam:->ams Has a \"code stroke\" or \"stroke alert\" been called? ->No Decision Support Exception - unselect if not a suspected or confirmed emergency medical condition->Emergency Medical Condition (MA) Reason for Exam: AMS FINDINGS: BRAIN/VENTRICLES: The ventricles and sulci are diffusely enlarged. Low attenuation is seen in the periventricular and subcortical white matter. No acute intracranial hemorrhage or acute infarct is identified. ORBITS: The visualized portion of the orbits demonstrate no acute abnormality. SINUSES: The visualized paranasal sinuses and mastoid air cells demonstrate no acute abnormality. SOFT TISSUES/SKULL:  No acute abnormality of the visualized skull or soft tissues. No acute intracranial abnormality. XR CHEST PORTABLE    Result Date: 11/12/2022  EXAMINATION: ONE XRAY VIEW OF THE CHEST 11/12/2022 12:57 pm COMPARISON: 11/10/2022. HISTORY: ORDERING SYSTEM PROVIDED HISTORY: ams TECHNOLOGIST PROVIDED HISTORY: Reason for exam:->ams Reason for Exam: ams FINDINGS: The cardiac silhouette is indeterminate. vertebral artery demonstrates normal antegrade flow. Circumscribed hyperechoic shadowing atheromatous plaque of the carotid bulb. ICA/CCA ratio of 1.2. No evidence of hemodynamically significant stenosis. LEFT: The left common carotid artery demonstrates peak systolic velocities of 496 and 102 cm/sec in the proximal and distal segments respectively. The left internal carotid artery demonstrates peak systolic velocities of 72, 84, and 72 cm/sec in the proximal, mid and distal segments respectively. The external carotid artery is patent. The vertebral artery demonstrates normal antegrade flow. Circumscribed hyperechoic nonshadowing atheromatous plaque of the carotid bulb. ICA/CCA ratio of 0.8. No evidence of hemodynamically significant stenosis. The right internal carotid artery demonstrates 0-50% stenosis. The left internal carotid artery demonstrates 0-50% stenosis. Bilateral vertebral arteries are patent with flow in the normal direction. IR US GUIDED PARACENTESIS    Result Date: 11/11/2022  PROCEDURE: PARACENTESIS WITHOUT IMAGE GUIDANCE US ABDOMEN LIMITED 11/11/2022 HISTORY: ORDERING SYSTEM PROVIDED HISTORY: Other cirrhosis of liver (HCC) TECHNIQUE: Maximum sterile barrier technique including hand hygiene, skin prep and sterile ultrasound technique utilized for procedure. Sterile ultrasound technique also utilized for procedure Ultrasound guidance required for procedure to confirm presence of ascites, puncture site selection, real-time intra procedural guidance. Images made for patient's medical file. .  Informed consent was obtained after a detailed explanation of the procedure including risks, benefits, and alternatives. Universal protocol was followed. A limited ultrasound of the abdomen was performed. The right abdomen was prepped and draped in sterile fashion and local anesthesia was achieved with lidocaine. An 6 Serbian needle sheath was advanced into ascites and paracentesis was performed.   The patient tolerated the procedure well. FINDINGS: Limited ultrasound of the abdomen demonstrates ascites with paracentesis catheter within it. A total of 5600 mL cloudy white ascitic fluid removed. 37.5 g intravenous albumin utilized for procedure. Successful ultrasound-guided paracentesis. MRI BRAIN WO CONTRAST    Result Date: 11/13/2022  EXAMINATION: MRI OF THE BRAIN WITHOUT CONTRAST  11/13/2022 2:02 pm TECHNIQUE: Multiplanar multisequence MRI of the brain was performed without the administration of intravenous contrast. COMPARISON: None. HISTORY: ORDERING SYSTEM PROVIDED HISTORY: dysarthria TECHNOLOGIST PROVIDED HISTORY: Reason for exam:->dysarthria What is the sedation requirement?->None FINDINGS: INTRACRANIAL STRUCTURES/VENTRICLES: There is no acute infarct. No mass effect or midline shift. No evidence of an acute intracranial hemorrhage. The ventricles and sulci are normal in size and configuration. The sellar/suprasellar regions appear unremarkable. The normal signal voids within the major intracranial vessels appear maintained. Mild chronic white matter microvascular ischemic changes are present. ORBITS: The visualized portion of the orbits demonstrate no acute abnormality. SINUSES: The visualized paranasal sinuses and mastoid air cells demonstrate no acute abnormality. BONES/SOFT TISSUES: The bone marrow signal intensity appears normal. The soft tissues demonstrate no acute abnormality. No acute infarct. IR GUIDED THORACENTESIS PLEURAL    Result Date: 11/10/2022  PROCEDURE: ULTRASOUNDGUIDED RIGHT THORACENTESIS 11/10/2022 HISTORY: ORDERING SYSTEM PROVIDED HISTORY: Pleural effusion TECHNOLOGIST PROVIDED HISTORY: Which side should the procedure be performed?->Right TECHNIQUE: Maximum sterile barrier technique including hand hygiene, skin prep and sterile ultrasound technique utilized for procedure.  Sterile ultrasound technique also utilized for procedure Ultrasound guidance required to confirm presence of pleural fluid, puncture site selection, real-time intra procedural guidance. Images made for patient's medical file. Informed consent was obtained after a detailed explanation of the procedure including risks, benefits, and alternatives. Universal protocol was performed. The right chest was prepped and draped in sterile fashion and local anesthesia was achieved with lidocaine. An 5 Kuwaiti needle sheath was advanced under ultrasound guidance into pleural effusion and thoracentesis was performed. The patient tolerated the procedure well. FINDINGS: Pre and intraprocedural images demonstrate large right pleural with paracentesis catheter within it. A total of 2000 mL yellow/white creamy fluid was removed from right pleural space. Postprocedure chest radiograph ordered. No complication suggested. Successful ultrasound guided right thoracentesis.        CBC:   Recent Labs     11/12/22  1420 11/13/22  0612 11/14/22  0812   WBC 7.5 7.2 6.5   HGB 12.4* 12.3* 12.3*    182 189     BMP:    Recent Labs     11/13/22  0612 11/14/22  0812 11/15/22  0856    135 139   K 4.2 4.5 4.3   CL 99 97* 97*   CO2 31 34* 32   BUN 32* 29* 27*   CREATININE 1.7* 1.8* 1.7*   GLUCOSE 116* 134* 111*     Hepatic:   Recent Labs     11/12/22  1420 11/13/22  0612 11/14/22  0812   AST 40* 40* 56*   ALT 23 24 30   BILITOT 0.9 0.9 0.7   ALKPHOS 96 101 105     Lipids:   Lab Results   Component Value Date/Time    CHOL 174 05/07/2022 03:25 AM    CHOL 230 10/14/2019 01:45 PM    HDL 28 05/07/2022 03:25 AM    TRIG 130 05/07/2022 03:25 AM     Hemoglobin A1C:   Lab Results   Component Value Date/Time    LABA1C 5.5 06/22/2022 10:23 AM     TSH:   Lab Results   Component Value Date/Time    TSH 1.39 10/14/2019 01:45 PM     Troponin:   Lab Results   Component Value Date/Time    TROPONINT <0.010 10/04/2022 11:11 AM    TROPONINT 0.017 09/09/2022 04:25 PM    TROPONINT 0.027 06/21/2022 08:27 PM     Lactic Acid: No results for input(s): LACTA in the last 72 hours. BNP: No results for input(s): PROBNP in the last 72 hours.   UA:  Lab Results   Component Value Date/Time    NITRU POSITIVE 11/12/2022 05:36 PM    COLORU YELLOW 11/12/2022 05:36 PM    WBCUA 11 11/12/2022 05:36 PM    RBCUA NONE SEEN 11/12/2022 05:36 PM    MUCUS RARE 06/22/2022 12:10 PM    TRICHOMONAS NONE SEEN 06/22/2022 12:10 PM    BACTERIA NEGATIVE 11/12/2022 05:36 PM    CLARITYU CLEAR 11/12/2022 05:36 PM    SPECGRAV 1.010 11/12/2022 05:36 PM    LEUKOCYTESUR MODERATE NUMBER OR AMOUNT OBSERVED 11/12/2022 05:36 PM    UROBILINOGEN 1.0 11/12/2022 05:36 PM    BILIRUBINUR NEGATIVE 11/12/2022 05:36 PM    BLOODU NEGATIVE 11/12/2022 05:36 PM    KETUA NEGATIVE 11/12/2022 05:36 PM     Urine Cultures: No results found for: LABURIN  Blood Cultures: No results found for: BC  No results found for: BLOODCULT2  Organism: No results found for: ORG    Time Spent Discharging patient 33 minutes    Electronically signed by Jg Murphy MD on 11/15/2022 at 11:58 AM

## 2022-11-15 NOTE — FLOWSHEET NOTE
Per OP pharmacy, Rifaximin is on back order and not available. Lasix was refilled too soon, therefore they will not be filling patients medications for discharge. Physician notified regarding the Rifaximin.

## 2022-11-15 NOTE — PROGRESS NOTES
Neurology Service Progress Note  [address]   Patient Name: Shannan Johnson  : 1945        Subjective:   CC: Altered mental status  Chart was reviewed in detail. Patient was seen and assessed. He is much more alert and is oriented x4 today. Wife is at bedside. She agrees that the patient is more lucid today. Ammonia decreased from 83-28 today. Patient's UTI is being managed with antimicrobial therapy. Imaging studies show evidence for acute process. Past Medical History:   Diagnosis Date    Anxiety     Cirrhosis, alcoholic ([address]     Diabetes mellitus ([address]     GERD (gastroesophageal reflux disease)     GERD (gastroesophageal reflux disease)     Hyperlipidemia     Hypertension     Portal hypertension (HCC)     Pulmonary nodules     Sleep apnea     SOB (shortness of breath)     Thyroid disease     :   Past Surgical History:   Procedure Laterality Date    APPENDECTOMY      CHOLECYSTECTOMY      COLONOSCOPY      ENDOSCOPY, COLON, DIAGNOSTIC      FOOT SURGERY      needle removed,not sure which foot, per wife.     TONSILLECTOMY      UPPER GASTROINTESTINAL ENDOSCOPY N/A 2022    EGD BIOPSY performed by Bear Short MD at [address] [place] [address]       Medications:  Scheduled Meds:   aspirin  81 mg Oral Daily    midodrine  5 mg Oral TID WC    albuterol sulfate HFA  2 puff Inhalation 4x daily    DULoxetine  60 mg Oral Daily    furosemide  40 mg Oral BID    levothyroxine  50 mcg Oral Daily    nadolol  40 mg Oral Nightly    spironolactone  100 mg Oral BID    sodium chloride flush  5-40 mL IntraVENous 2 times per day    cefTRIAXone (ROCEPHIN) IV  1,000 mg IntraVENous Q24H    enoxaparin  40 mg SubCUTAneous Daily    pantoprazole  40 mg Oral QAM AC    rifAXIMin  400 mg Oral TID    omega-3 acid ethyl esters  2 g Oral Daily     Continuous Infusions:   sodium chloride       PRN Meds:.sodium chloride flush, sodium chloride, ondansetron **OR** ondansetron, polyethylene glycol    Allergies   Allergen Reactions    Lisinopril Swelling     Tongue swelling      Zetia [Ezetimibe] Swelling     Facial swelling    Atorvastatin     Codeine Other (See Comments)     Blood pressure drops    Hydrochlorothiazide     Hydroxyzine      Fatigue and depressed    Lexapro [Escitalopram Oxalate]      Increased depression    Pravastatin      Social History     Socioeconomic History    Marital status:      Spouse name: Not on file    Number of children: Not on file    Years of education: Not on file    Highest education level: Not on file   Occupational History     Comment: Retired    Tobacco Use    Smoking status: Former     Packs/day: 1.00     Years: 14.00     Pack years: 14.00     Types: Cigarettes     Quit date:      Years since quittin.9    Smokeless tobacco: Never   Vaping Use    Vaping Use: Never used   Substance and Sexual Activity    Alcohol use: Not Currently    Drug use: Never    Sexual activity: Yes     Partners: Female   Other Topics Concern    Not on file   Social History Narrative    Not on file     Social Determinants of Health     Financial Resource Strain: Low Risk     Difficulty of Paying Living Expenses: Not hard at all   Food Insecurity: No Food Insecurity    Worried About Running Out of Food in the Last Year: Never true    Ran Out of Food in the Last Year: Never true   Transportation Needs: Not on file   Physical Activity: Not on file   Stress: Not on file   Social Connections: Not on file   Intimate Partner Violence: Not on file   Housing Stability: Not on file      Family History   Problem Relation Age of Onset    Hypertension Brother     Diabetes Other          ROS (10 systems)  In addition to that documented in the HPI above, the additional ROS was obtained:  Constitutional: Denies fevers or chills  Eyes: Denies vision changes  ENMT: Denies sore throat  CV: Denies chest pain  Resp: Denies SOB  GI: Denies vomiting or diarrhea  : Denies painful urination  MSK: Denies recent trauma  Skin: Denies new rashes  Neuro: Denies new numbness or tingling or weakness  Endocrine: Denies unexpected weight loss  Heme: Denies bleeding disorders    Physical Exam:         Wt Readings from Last 3 Encounters:   11/13/22 218 lb 14.7 oz (99.3 kg)   11/13/22 218 lb (98.9 kg)   11/10/22 241 lb (109.3 kg)     Temp Readings from Last 3 Encounters:   11/14/22 97.6 °F (36.4 °C) (Oral)   11/11/22 98.3 °F (36.8 °C) (Temporal)   11/10/22 97.8 °F (36.6 °C) (Temporal)     BP Readings from Last 3 Encounters:   11/14/22 (!) 144/70   11/11/22 115/65   11/10/22 95/63     Pulse Readings from Last 3 Encounters:   11/14/22 86   11/11/22 75   11/10/22 61        Gen: A&O x 4, NAD, cooperative  HEENT: NC/AT, EOMI, PERRL, mmm, neck supple, no meningeal signs; Heart: regular   Lungs: no distress  Ext: no edema, no calf tenderness b/l  Psych: normal mood and affect  Skin: no rashes or lesions    NEUROLOGIC EXAM:    Mental Status: A&O to self, location, month and year, NAD, speech clear, language fluent, repetition and naming intact, follows commands appropriately    Cranial Nerve Exam:   CN II-XII: , sensation V1-V3 intact b/l, muscles of facial expression symmetric; severely [place] palate elevates symmetrically, shoulder elevation symmetric and tongue protrudes midline with movement side to side.     Motor Exam:       Strength 5/5 UE's/LE's b/l  Tone and bulk normal   No pronator drift    Deep Tendon Reflexes: 2/4 biceps, brachioradialis, patellar, and achilles b/l; flexor plantar responses b/l    Sensation: Intact light touch UE's/LE's b/l    Coordination/Cerebellum:       Tremors--none      Rapidly alternating movements: no dysdiadochokinesia b/l                Finger-to-Nose: no dysmetria b/l    Gait and stance:      Gait: deferred      LABS:     Recent Labs     11/12/22  1420 11/13/22  0612 11/13/22  2155 11/14/22  0812   WBC 7.5 7.2  --  6.5    140  --  135   K 4.5 4.2  --  4.5   CL 97* 99 --  97*   CO2 29 31  --  34*   BUN 35* 32*  --  29*   CREATININE 1.9* 1.7*  --  1.8*   GLUCOSE 112* 116*  --  134*   INR  --   --  1.01 1.03           IMAGING:      CT head:  No acute intracranial abnormality. MRI Brain   mpression   No acute infarct. CUS  Impression   The right internal carotid artery demonstrates 0-50% stenosis. The left internal carotid artery demonstrates 0-50% stenosis. Bilateral vertebral arteries are patent with flow in the normal direction. ASSESSMENT/PLAN:     71-year-old male with altered mental status and transient amnesia, likely secondary to hepatic encephalopathy superimposed on acute cystitis, low suspicion for GCA, or seizure based on description of events. No evidence for acute stroke or flow-limiting stenosis on imaging. Neuro exam is unremarkable. Neuroimaging as above  Continue aspirin 81 mg  Management of UTI and cirrhosis per IM  PT OT as recommended  No further recommendations. Neurology will sign off at this time. Please contact us with any new concerns    Thank you for allowing us to participate in the care of your patient. If there are any questions regarding evaluation please feel free to contact us. > 30 minutes of time spent included chart review, obtaining history, patient examination, developing plan of care, and documentation. Patient was discussed with attending neurologist Dr. Lou Carrasco.       MOHINDER Chandler - [facility] 11/14/2022 7:26 PM  Neurology

## 2022-11-15 NOTE — PROGRESS NOTES
-Sitting in a chair  -Rec Giving a break from lasix today and resume tomorrow  -As mentioned at previous admissions, he is better off getting tap'ed for the pleural effusions or ascites vs having him on high does diuretics that will end up resulting in more volume depletion and then CANDIE.   It would be best to avoid dialysis in him, considering all his other medical problems     Patient Active Problem List    Diagnosis Date Noted    AMS (altered mental status) 11/12/2022    Hypoxia 11/07/2022    Pleural effusion 10/04/2022    Recurrent pleural effusion on right 09/09/2022    Hypersomnia 08/15/2022    Ex-smoker 08/15/2022    Obesity (BMI 30-39.9) 08/15/2022    Type 2 diabetes mellitus with chronic kidney disease 07/12/2022    Thyroid disease 06/21/2022    Cellulitis of left lower extremity 06/08/2022    Type 2 diabetes mellitus without complication, without long-term current use of insulin ([address] 06/08/2022    Generalized weakness     Oropharyngeal dysphagia     Acute kidney injury superimposed on chronic kidney disease ([address]     Acute respiratory failure ([address] 05/04/2022    Recurrent right pleural effusion 05/02/2022    Sepsis ([address] 03/19/2022    Hepatic encephalopathy 03/19/2022    Hearing loss 03/11/2022    Cirrhosis of liver with ascites ([address]     Secondary esophageal varices without bleeding ([address]     Gastric polyps     SOB (shortness of breath) 09/13/2021    Portal hypertension ([address] 08/30/2021    JENNIFER (obstructive sleep apnea) 08/09/2021    Bilateral hearing loss 05/07/2021    Increased ammonia level 05/07/2021    Class 3 severe obesity with body mass index (BMI) of 40.0 to 44.9 in adult [facility]) 09/21/2020    Hyperglycemia 12/04/2019    Acquired hypothyroidism 12/04/2019    Pulmonary nodules 09/09/2019    Ascites due to alcoholic cirrhosis ([address] 11/37/6075    Mixed hyperlipidemia 08/08/2019    Hypertension 08/08/2019    History of alcohol abuse 08/08/2019    Gastroesophageal reflux disease 08/08/2019    Anxiety 08/08/2019    Shortness of breath 05/26/2019    History of colonic polyps 01/15/2019

## 2022-11-15 NOTE — PROGRESS NOTES
Pt has been increasingly agitated since start of shift. He is confused as to why he is in the hospital. Oriented to self, but disoriented to time and situation. Pt attempting to get out of bed and taking off cords to go to RR w/out help. I attempted to educate pt on safety, staying in bed, and calling for help. Pt states he is a prisoner and no one wants to help him. He also keeps saying he is dying. I have attempted to calm pt down, but he has only become more agitated. Secure message sent to Annmarie Watts NP for eval. VSS. Afebrile. Bed low/locked. Call light in reach.

## 2022-11-15 NOTE — TELEPHONE ENCOUNTER
Care Transitions Initial Follow Up Call    Outreach made within 2 business days of discharge: Yes    Patient: Hoang Barry Patient : 1945   MRN: [id number]  Reason for Admission: There are no discharge diagnoses documented for the most recent discharge. Discharge Date: 11/15/22       Spoke with: wife    Discharge department/facility: [facility]    TCM Interactive Patient Contact:  Was patient able to fill all prescriptions: Yes  Was patient instructed to bring all medications to the follow-up visit: Yes  Is patient taking all medications as directed in the discharge summary?  Yes  Does patient understand their discharge instructions: Yes  Does patient have questions or concerns that need addressed prior to 7-14 day follow up office visit: no    Scheduled appointment with PCP within 7-14 days    Follow Up  Future Appointments   Date Time Provider Lucita Murphy   2022  2:15 PM Ashwini Cowan MD [address] [place] [address]   2023  2:45 PM Theodore Butcher MD [facility]   [facility]       Luis Patel LPN

## 2022-11-16 ENCOUNTER — CARE COORDINATION (OUTPATIENT)
Dept: CASE MANAGEMENT | Age: 77
End: 2022-11-16

## 2022-11-16 DIAGNOSIS — N17.9 ACUTE KIDNEY INJURY SUPERIMPOSED ON CHRONIC KIDNEY DISEASE (HCC): Primary | ICD-10-CM

## 2022-11-16 DIAGNOSIS — N18.9 ACUTE KIDNEY INJURY SUPERIMPOSED ON CHRONIC KIDNEY DISEASE (HCC): Primary | ICD-10-CM

## 2022-11-16 NOTE — CARE COORDINATION
[facility] Care Transitions Initial Follow Up Call    Call within 2 business days of discharge: Yes    Care Transition Nurse contacted the spouse by telephone to perform post hospital discharge assessment. Verified name and  with  spouse  as identifiers. Provided introduction to self, and explanation of the Care Transition Nurse role. Patient: [place] Staff Patient : 1945   MRN: [id number]  Reason for Admission: AMS-confusion  Discharge Date: 11/15/22 RARS: Readmission Risk Score: 30.7      Last Discharge  [address]       Date Complaint Diagnosis Description Type Department Provider    22 Altered Mental Status Altered mental status, unspecified altered mental status type . .. ED to Hosp-Admission (Discharged) (ADMITTED) Shay Bartlett MD; Maryellen Short... Was this an external facility discharge? No Discharge Facility: [facility]     Challenges to be reviewed by the provider   Additional needs identified to be addressed with provider: No  none               Method of communication with provider: none. Contacted patient for initial care transition follow up. Spoke with patient's spouse Estephanie Lovettkavitha (on HIPAA). She states Aurelia Stroud is still sleeping. Reports he is back to his baseline which is \"a little confusion\". States he's had a little confusion for approximately 6 months since his medical issues started. She reports his breathing has been \"fine\" and his abdomen is \"not as distended\" as it was. She informed CTN that GI and Pulmonology has placed standing orders for paracentesis and thoracentesis. She reports that he has been doing better since his last paracentesis and thoracentesis. She is aware of when to contact providers when Aurelia Stroud starts having s/s of increase fluid in abdomen and around his lungs. Estephanie Mcclendon states he is just tired. He is eating and monitoring his sodium intake. He is also staying hydrated.   She states he seems to be drinking plenty of fluids and was told to cut back by providers but Camila Lofton does what he wants to. She will continue to remind him about his fluid intake. Reviewed medication list.  Darla Paget states she will start taking over his medications which he had been previously taking care of himself. She is aware of the changes to his medications-Lasix and Xifaxan. Patient has follow up appointments on 12/5/22 with pulmonology, 12/8/22 with PCP and 2/6/22 with GI. Discussed importance of 7 day follow up. She states that she was told by the PCP office that 12/8/22 is the soonest available appointment. CTN will send a message to Andrea Bryan Practice Manager to check if she can get patient in sooner. Darla Paget verbalized understanding. She is aware of when to contact providers with any new or worsening symptoms. She does not have any questions or needs at this time. Informed her either myself or the office will contact her if a sooner appointment is available. CTN sent an email to Andrea Bryan Practice Manager requesting a possible sooner appointment. Received incoming email from Andrea Bryan. She scheduled patient for 11/22/22 at 1:30 with Dr. Monique Rodriguez. CTN will notify patient's spouse. CTN contacted patient's spouse Darla Paget. Informed her that his appointment was rescheduled to Tuesday, 11/22/22 at 1:30 with Dr. Monique Rodriguez. She verbalized understanding and was grateful for the help. No other questions or needs at this time. Care Transition Nurse reviewed discharge instructions, medical action plan, and red flags with Spouse who verbalized understanding. The  spouse  was given an opportunity to ask questions and does not have any further questions or concerns at this time. Were discharge instructions available to patient? Yes. Reviewed appropriate site of care based on symptoms and resources available to patient including: PCP  Specialist. The  spouse  agrees to contact the PCP office for questions related to their healthcare.

## 2022-11-17 LAB
CULTURE: NORMAL
CULTURE: NORMAL
Lab: NORMAL
Lab: NORMAL
SPECIMEN: NORMAL
SPECIMEN: NORMAL

## 2022-11-22 ENCOUNTER — OFFICE VISIT (OUTPATIENT)
Dept: FAMILY MEDICINE CLINIC | Age: 77
End: 2022-11-22
Payer: COMMERCIAL

## 2022-11-22 VITALS
BODY MASS INDEX: 34.56 KG/M2 | HEIGHT: 68 IN | DIASTOLIC BLOOD PRESSURE: 72 MMHG | SYSTOLIC BLOOD PRESSURE: 144 MMHG | WEIGHT: 228 LBS | HEART RATE: 84 BPM

## 2022-11-22 DIAGNOSIS — Z92.89 HISTORY OF RECENT HOSPITALIZATION: Primary | ICD-10-CM

## 2022-11-22 DIAGNOSIS — I10 PRIMARY HYPERTENSION: ICD-10-CM

## 2022-11-22 DIAGNOSIS — E03.9 ACQUIRED HYPOTHYROIDISM: ICD-10-CM

## 2022-11-22 DIAGNOSIS — K70.31 ALCOHOLIC CIRRHOSIS OF LIVER WITH ASCITES (HCC): ICD-10-CM

## 2022-11-22 DIAGNOSIS — F41.9 ANXIETY: ICD-10-CM

## 2022-11-22 DIAGNOSIS — E11.22 TYPE 2 DIABETES MELLITUS WITH STAGE 3 CHRONIC KIDNEY DISEASE, WITHOUT LONG-TERM CURRENT USE OF INSULIN, UNSPECIFIED WHETHER STAGE 3A OR 3B CKD (HCC): ICD-10-CM

## 2022-11-22 DIAGNOSIS — N18.30 TYPE 2 DIABETES MELLITUS WITH STAGE 3 CHRONIC KIDNEY DISEASE, WITHOUT LONG-TERM CURRENT USE OF INSULIN, UNSPECIFIED WHETHER STAGE 3A OR 3B CKD (HCC): ICD-10-CM

## 2022-11-22 DIAGNOSIS — K76.82 HEPATIC ENCEPHALOPATHY: ICD-10-CM

## 2022-11-22 PROCEDURE — 90471 IMMUNIZATION ADMIN: CPT | Performed by: FAMILY MEDICINE

## 2022-11-22 PROCEDURE — 3044F HG A1C LEVEL LT 7.0%: CPT | Performed by: FAMILY MEDICINE

## 2022-11-22 PROCEDURE — 99214 OFFICE O/P EST MOD 30 MIN: CPT | Performed by: FAMILY MEDICINE

## 2022-11-22 PROCEDURE — 3078F DIAST BP <80 MM HG: CPT | Performed by: FAMILY MEDICINE

## 2022-11-22 PROCEDURE — 1124F ACP DISCUSS-NO DSCNMKR DOCD: CPT | Performed by: FAMILY MEDICINE

## 2022-11-22 PROCEDURE — 3074F SYST BP LT 130 MM HG: CPT | Performed by: FAMILY MEDICINE

## 2022-11-22 PROCEDURE — 90694 VACC AIIV4 NO PRSRV 0.5ML IM: CPT | Performed by: FAMILY MEDICINE

## 2022-11-22 RX ORDER — FUROSEMIDE 40 MG/1
40 TABLET ORAL DAILY
Qty: 60 TABLET | Refills: 3 | Status: SHIPPED | OUTPATIENT
Start: 2022-11-22

## 2022-11-22 RX ORDER — ALPRAZOLAM 0.5 MG/1
0.5 TABLET ORAL 2 TIMES DAILY PRN
Qty: 30 TABLET | Refills: 0 | Status: SHIPPED | OUTPATIENT
Start: 2022-11-22 | End: 2022-12-22

## 2022-11-23 ENCOUNTER — CARE COORDINATION (OUTPATIENT)
Dept: CASE MANAGEMENT | Age: 77
End: 2022-11-23

## 2022-11-23 ASSESSMENT — ENCOUNTER SYMPTOMS
TROUBLE SWALLOWING: 0
VOMITING: 0
CHEST TIGHTNESS: 0
DIARRHEA: 0
WHEEZING: 0
BLOOD IN STOOL: 0
COUGH: 1
NAUSEA: 0
EYE PAIN: 0
SHORTNESS OF BREATH: 1
ABDOMINAL PAIN: 0

## 2022-11-23 NOTE — CARE COORDINATION
[facility] Care Transitions Follow Up Call    Patient: Carolynn Oliver  Patient : 1945   MRN: [id number]  Reason for Admission: AMS-confusion  Discharge Date: 11/15/22 RARS: Readmission Risk Score: 30.7    Attempted to contact patient for care transition follow up. Unable to reach patient. Call was disconnected when calling mobile number listed. Also attempted to contact home number listed. Recording states the wireless customer is not available, please try again later. CTN will call back at a later time.         Juan Manuel Reyes RN

## 2022-11-23 NOTE — PROGRESS NOTES
11/23/2022    [facility]    Chief Complaint   Patient presents with    Follow-Up from [facility]     Gained 6 pounds in 2 weeks. Confusion, elevated ammonia. Did thoracentsis and paracentisis 2 weeks ago. Pharyngitis     About a week       HPI  History was obtained from the patient and his wife. Alison Mckay is a 78 y.o. male who presents today with history of recent hospitalization at [facility] for altered mental status. He was in the hospital from 11/12 through 11/15/2022. He has a known history of alcoholic cirrhosis with secondary portal hypertension intermittent ascites accumulation and pleural effusion as well as hepatic encephalopathy. Patient on this admission was seen by hospitalist, GI medicine, and neurology. He did receive a paracentesis as well as pleural fluid tap he did does show marked improvement. Ammonia levels and serial labs were obtained. Received albumin and Lasix for fluid overload as per nephrology recommendations. Patient made clinical improvement and was subsequently discharged home. Other diagnosed this patient include hypothyroidism, obesity poor hearing and anxiety. Today was complaining only of a sore throat on the right side been there for 1 to 2 days evaluation today shows no throat changes to exam but does have muscle tenderness in the sternocleidomastoid muscle. On review he will need follow-up labs and a high-dose flu shot. Needs some refills and also will need a COVID booster in the near future. Was advised to follow-up with specialist as previously directed. REVIEW OF SYMPTOMS    Review of Systems   Constitutional:  Negative for activity change and fatigue. HENT:  Positive for hearing loss. Negative for congestion, mouth sores and trouble swallowing. Eyes:  Negative for pain and visual disturbance. Respiratory:  Positive for cough and shortness of breath. Negative for chest tightness and wheezing.     Cardiovascular:  Negative for chest pain and palpitations. Gastrointestinal:  Negative for abdominal pain, blood in stool, diarrhea, nausea and vomiting. Endocrine: Negative for polydipsia. Genitourinary:  Negative for dysuria, frequency and urgency. Musculoskeletal:  Positive for neck pain. Negative for arthralgias, gait problem and neck stiffness. Skin:  Negative for rash. Allergic/Immunologic: Negative for environmental allergies. Neurological:  Negative for dizziness, seizures, speech difficulty and weakness. Patient with history of confusion from elevated ammonia level and hepatic encephalopathy improved after recent treatment. Hematological:  Does not bruise/bleed easily. Psychiatric/Behavioral:  Positive for decreased concentration. Negative for agitation, confusion, hallucinations, self-injury and suicidal ideas. The patient is not hyperactive.       PAST MEDICAL HISTORY  Past Medical History:   Diagnosis Date    Anxiety     Cirrhosis, alcoholic ([facility] [address]     Diabetes mellitus ([facility] [address]     GERD (gastroesophageal reflux disease)     GERD (gastroesophageal reflux disease)     Hyperlipidemia     Hypertension     Portal hypertension (HCC)     Pulmonary nodules     Sleep apnea     SOB (shortness of breath)     Thyroid disease        FAMILY HISTORY  Family History   Problem Relation Age of Onset    Hypertension Brother     Diabetes Other        SOCIAL HISTORY  Social History     Socioeconomic History    Marital status:      Spouse name: None    Number of children: None    Years of education: None    Highest education level: None   Occupational History     Comment: Retired    Tobacco Use    Smoking status: Former     Packs/day: 1.00     Years: 14.00     Pack years: 14.00     Types: Cigarettes     Quit date:      Years since quittin.9    Smokeless tobacco: Never   Vaping Use    Vaping Use: Never used   Substance and Sexual Activity    Alcohol use: Not Currently    Drug use: Never    Sexual activity: Yes Partners: Female     Social Determinants of Health     Financial Resource Strain: Low Risk     Difficulty of Paying Living Expenses: Not hard at all   Food Insecurity: No Food Insecurity    Worried About Running Out of Food in the Last Year: Never true    [address] in the Last Year: Never true        SURGICAL HISTORY  Past Surgical History:   Procedure Laterality Date    APPENDECTOMY      CHOLECYSTECTOMY      COLONOSCOPY      ENDOSCOPY, COLON, DIAGNOSTIC      FOOT SURGERY      needle removed,not sure which foot, per wife. TONSILLECTOMY      UPPER GASTROINTESTINAL ENDOSCOPY N/A 1/19/2022    EGD BIOPSY performed by Mckenzie Mckeon MD at [address]  Current Outpatient Medications   Medication Sig Dispense Refill    furosemide (LASIX) 40 MG tablet Take 1 tablet by mouth daily 60 tablet 3    rifAXIMin (XIFAXAN) 550 MG tablet Take 1 tablet by mouth 2 times daily 60 tablet 3    ALPRAZolam (XANAX) 0.5 MG tablet Take 1 tablet by mouth 2 times daily as needed for Sleep for up to 30 days.  30 tablet 0    levothyroxine (SYNTHROID) 50 MCG tablet Take 1 tablet by mouth Daily 90 tablet 1    albuterol sulfate HFA (PROVENTIL;VENTOLIN;PROAIR) 108 (90 Base) MCG/ACT inhaler INHALE 2 PUFFS BY MOUTH 4 TIMES DAILY AS NEEDED FOR WHEEZING 1 each 2    DULoxetine (CYMBALTA) 60 MG extended release capsule TAKE 1 CAPSULE BY MOUTH ONCE DAILY 90 capsule 1    betamethasone valerate (VALISONE) 0.1 % lotion Apply topically 2 times daily prn scalp rash 60 mL 3    spironolactone (ALDACTONE) 100 MG tablet Take 100 mg by mouth 2 times daily      lactulose (CHRONULAC) 10 GM/15ML solution Take 30 mLs by mouth 3 times daily Hold if having diarrhea 2700 mL 5    Multiple Vitamin (MULTI VITAMIN MENS PO) Take 1 tablet by mouth daily      ESOMEPRAZOLE MAGNESIUM PO Take 20 mg by mouth daily OTC      Omega-3 1000 MG CAPS Take 2,000 mg by mouth daily      nadolol (CORGARD) 40 MG tablet Take 1 tablet by mouth in the morning. (Patient taking differently: Take 40 mg by mouth nightly) 90 tablet 3     No current facility-administered medications for this visit. ALLERGIES  Allergies   Allergen Reactions    Lisinopril Swelling     Tongue swelling      Zetia [Ezetimibe] Swelling     Facial swelling    Atorvastatin     Codeine Other (See Comments)     Blood pressure drops    Hydrochlorothiazide     Hydroxyzine      Fatigue and depressed    Lexapro [Escitalopram Oxalate]      Increased depression    Pravastatin        PHYSICAL EXAM    BP (!) 144/72 (Site: Right Upper Arm, Position: Sitting, Cuff Size: Medium Adult)   Pulse 84   Ht 5' 8\" (1.727 m)   Wt 228 lb (103.4 kg)   BMI 34.67 kg/m²     Physical Exam  Vitals and nursing note reviewed. Constitutional:       General: He is not in acute distress. Appearance: He is well-developed. He is obese. He is not ill-appearing or toxic-appearing. HENT:      Head: Normocephalic and atraumatic. Ears:      Comments: Patient with obvious severe hearing loss and hearing aids. Nose: No congestion. Mouth/Throat:      Mouth: Mucous membranes are moist.      Pharynx: Oropharynx is clear. Eyes:      Pupils: Pupils are equal, round, and reactive to light. Cardiovascular:      Rate and Rhythm: Normal rate and regular rhythm. Heart sounds: Normal heart sounds. Pulmonary:      Effort: Pulmonary effort is normal. No respiratory distress. Breath sounds: Normal breath sounds. No stridor. No wheezing or rhonchi. Abdominal:      General: There is distension. Palpations: Abdomen is soft. Tenderness: There is no abdominal tenderness. There is no rebound. Musculoskeletal:         General: Swelling present. No deformity. Normal range of motion. Cervical back: Normal range of motion and neck supple. Tenderness present. Skin:     General: Skin is warm and dry. Coloration: Skin is not jaundiced. Findings: No bruising. Neurological:      General: No focal deficit present. Mental Status: He is alert and oriented to person, place, and time. Mental status is at baseline. Cranial Nerves: No cranial nerve deficit. Motor: Weakness present. Psychiatric:         Mood and Affect: Mood normal.         Thought Content: Thought content normal.       ASSESSMENT & PLAN     Diagnosis Orders   1. History of recent hospitalization        2. Hepatic encephalopathy  Ammonia      3. Alcoholic cirrhosis of liver with ascites (HCC)  CBC    Comprehensive Metabolic Panel      4. Type 2 diabetes mellitus with stage 3 chronic kidney disease, without long-term current use of insulin, unspecified whether stage 3a or 3b CKD (HCC)  Comprehensive Metabolic Panel    Hemoglobin A1C      5. Primary hypertension        6. Acquired hypothyroidism        7. Anxiety  ALPRAZolam (XANAX) 0.5 MG tablet      Reassurance provided and questions answered. Check CBC, CMP, A1c, mag level, and ammonia level today and have patient/spouse follow-up for results. Refills provided including occasional use of alprazolam- PDMP review was appropriate. He did receive a high-dose flu shot today and was advised to get COVID booster ASAP. Keep appointments with subspecialists. Call with changes or problems. Of course absolutely no alcohol. Return in about 3 months (around 2/22/2023).          Electronically signed by Gricel Whittington MD on 11/23/2022

## 2022-11-23 NOTE — PROGRESS NOTES
Physician Progress Note      PATIENT:               Hillary Amanda  CSN #:                  [id number]  :                       1945  ADMIT DATE:       2022 12:21 PM  [address] [place] DATE:        11/15/2022 2:10 PM  RESPONDING  PROVIDER #:        Jaren Felipe MD          QUERY TEXT:    Patient presented with hepatic encephalopathy in the setting of alcoholic   cirrhosis with recent paracentesis and thoracentesis. Creatinine elevated at   1.9 on admission. Patient had episodes of hypotension. Nephrology was   consulted who documented \"treat him as HRS with maximum therapy\". Treatment   included Midodrine, octreotide, albumin and Lasix. After study, were you   treating this patient for hepatorenal syndrome?       The medical record reflects the following:  Risk Factors: Alcoholic Cirrhosis, hepatic encephalopathy  Clinical Indicators: Nephrology was consulted who documented \"treat him as HRS   with maximum therapy\", noted in Nephrology notes, Creatinine elevated at 1.9   on admission->1.8->1.7, hypotension  Treatment: Midodrine, octreotide, albumin and Lasix, Nephrology consult    Thank you, Brian Yip RN, CDs ([phone])  Options provided:  -- Hepatorenal syndrome  -- This pt does not have hepatorenal syndrome  -- Other - I will add my own diagnosis  -- Disagree - Not applicable / Not valid  -- Disagree - Clinically unable to determine / Unknown  -- Refer to Clinical Documentation Reviewer    PROVIDER RESPONSE TEXT:    Patient has possible Hepatorenal Syndrome    Query created by: Lily Archuleta on 2022 4:29 PM      Electronically signed by:  Jaren Felipe MD 2022 7:04 AM

## 2022-11-29 ENCOUNTER — CARE COORDINATION (OUTPATIENT)
Dept: CASE MANAGEMENT | Age: 77
End: 2022-11-29

## 2022-11-29 RX ORDER — SODIUM CHLORIDE 0.9 % (FLUSH) 0.9 %
10 SYRINGE (ML) INJECTION PRN
OUTPATIENT
Start: 2022-11-29

## 2022-11-29 NOTE — CARE COORDINATION
[address] Care Transitions Follow Up Call    Care Transition Nurse contacted the  spouse  by telephone to follow up after admission on 22. Verified name and  with  spouse  as identifiers. Patient: Samantha Bustamante  Patient : 1945   MRN: [id number]  Reason for Admission: AMS-confusion  Discharge Date: 11/15/22 RARS: Readmission Risk Score: 30.7      Needs to be reviewed by the provider   Additional needs identified to be addressed with provider: No  none             Method of communication with provider: none. Contacted patient for care transition follow up. Spoke briefly with patient's spouse [facility]. She states that Boby's abdomen is distended. He is scheduled for a paracentesis tomorrow, 22. She states that he has also been short of breath. She reports that he may need a thoracentesis and will see what they say tomorrow. She denies Sher Quinterosting being in any distress. She reports his mental status is back to baseline. He is eating well and drinking his fluids. She states he is taking his medications as he should be. She is aware of when to contact providers with any new or worsening symptoms. No questions or needs at this time. Addressed changes since last contact:   Paracentesis scheduled for 22  Discussed follow-up appointments. If no appointment was previously scheduled, appointment scheduling offered: Yes. Is follow up appointment scheduled within 7 days of discharge? Yes.     Follow Up  Future Appointments   Date Time Provider Lucita Murphy   2022  9:30 AM [facility] IR ROOM 1 [id number] [facility] Radiolo   2022  2:15 PM Lizzette Dubose MD [facility] PULM MMA   2023  2:10 PM Villa Pedroza MD [facility] Gastro MMA   2023  1:15 PM Bina Corona MD [facility] FPS [facility]       Patients top risk factors for readmission: lack of knowledge about disease and medical condition-CANDIE superimposed on CKD, AMS, DM, Ascites d/t alcoholic cirrhosis  Interventions to address risk factors:  Paracentesis on 11/30/22    Offered patient enrollment in the Remote Patient Monitoring (RPM) program for in-home monitoring: NA.     Care Transitions Subsequent and Final Call    Subsequent and Final Calls  Do you have any ongoing symptoms?: Yes  Patient-reported symptoms: Other, Shortness of Breath  Have your medications changed?: No  Do you have any questions related to your medications?: No  Do you currently have any active services?: No  Do you have any needs or concerns that I can assist you with?: No  Care Transitions Interventions  Other Interventions:             Care Transition Nurse provided contact information for future needs. Plan for follow-up call in 3-5 days based on severity of symptoms and risk factors.   Plan for next call: symptom management-SOB, abdominal discomfort, distention, any new or worsening symptoms    Nicole Valdez RN

## 2022-11-29 NOTE — PROGRESS NOTES
IR Procedure at [facility]:  Spoke with patient's wife Yi Hitchcock and patient will arrive at 0730 at [facility] on 11/30/2022 for his procedure at 0930. (Paracentesis, Thoracentesis, Thyroid Bx and Injections may have a light breakfast)  2. Follow your directions as prescribed by the doctor for your procedure and medications. 3.   Consult your provider as to when to stop blood thinner  4. Do not take any pain medication within 6 hours of your procedure  5. Do not drink any alcoholic beverages or use any street drugs 24 hours before procedure. 6.   Please wear simple, loose fitting clothing to the hospital.  Do not bring valuables (money,             credit cards, checkbooks, etc.)     7. If you  have a Living Will and Durable Power of  for Healthcare, please bring in a copy. 8.   Please bring picture ID,  insurance card, paperwork from the doctors office            (H & P, Consent,  & card for implantable devices). 9.   Report to the information desk on the ground floor. 10. Take a shower the night before or morning of your procedure, do not apply any lotion, oil or powder. 11. If you are going to be sedated for the procedure, you will need a responsible adult to drive you home.

## 2022-11-30 ENCOUNTER — HOSPITAL ENCOUNTER (OUTPATIENT)
Dept: INTERVENTIONAL RADIOLOGY/VASCULAR | Age: 77
Discharge: HOME | End: 2022-11-30
Payer: COMMERCIAL

## 2022-11-30 VITALS
RESPIRATION RATE: 18 BRPM | HEART RATE: 75 BPM | OXYGEN SATURATION: 98 % | SYSTOLIC BLOOD PRESSURE: 146 MMHG | DIASTOLIC BLOOD PRESSURE: 74 MMHG | TEMPERATURE: 97.8 F

## 2022-11-30 DIAGNOSIS — K74.60 HEPATIC CIRRHOSIS, UNSPECIFIED HEPATIC CIRRHOSIS TYPE, UNSPECIFIED WHETHER ASCITES PRESENT (HCC): ICD-10-CM

## 2022-11-30 PROCEDURE — 7100000011 HC PHASE II RECOVERY - ADDTL 15 MIN

## 2022-11-30 PROCEDURE — 7100000010 HC PHASE II RECOVERY - FIRST 15 MIN

## 2022-11-30 PROCEDURE — 49083 ABD PARACENTESIS W/IMAGING: CPT

## 2022-11-30 PROCEDURE — 6360000002 HC RX W HCPCS

## 2022-11-30 PROCEDURE — 2709999900 IR US GUIDED PARACENTESIS

## 2022-11-30 PROCEDURE — P9047 ALBUMIN (HUMAN), 25%, 50ML: HCPCS

## 2022-11-30 RX ORDER — ALBUMIN (HUMAN) 12.5 G/50ML
50 SOLUTION INTRAVENOUS ONCE
Status: COMPLETED | OUTPATIENT
Start: 2022-11-30 | End: 2022-11-30

## 2022-11-30 RX ADMIN — ALBUMIN (HUMAN) 50 G: 12.5 SOLUTION INTRAVENOUS at 10:39

## 2022-11-30 ASSESSMENT — PAIN SCALES - GENERAL: PAINLEVEL_OUTOF10: 0

## 2022-11-30 ASSESSMENT — PAIN - FUNCTIONAL ASSESSMENT: PAIN_FUNCTIONAL_ASSESSMENT: 0-10

## 2022-11-30 NOTE — DISCHARGE INSTRUCTIONS
[facility]    Patient Discharge Instructions Abdominal Fluid Drainage      You have had had an Abdominal Fluid Drainage in the Radiology Department. Below are guidelines for you to follow after your test is completed:    Go home and rest quietly for the remainder of the day. DO NOT drive, operate appliances, or make any legal decisions today. You may resume normal activities tomorrow. Have a responsible adult drive you home and remain with you for the remainder of the day. You may resume your normal diet after the procedure. Avoid alcoholic beverages and depressant drugs for 24 hours. You may resume your previous medications unless directed not to by your physician or the Radiologist. Meg Jane may use Tylenol (one or two tablets every four to six hours) for discomfort at the puncture site. If you develop severe pain, swelling, or redness at the site, call the Radiology Department. Call your physician immediately if you develop a rapid pulse (greater than 100 beats per minute), feel faint, sweat profusely, experience a sudden onset of weakness, or experience increased pain or swelling or saturation of dressing at the puncture site. Call your physician immediately if you develop a sudden onset of rapid breathing (greater than 20 breaths per minute), upper back or chest pain, shortness of breath, sweating, skin color change, or a sudden onset of anxiety. Report a temperature greater than 101 degrees Fahrenheit (38.8 degrees Celsius) to us or to your physician. Check the dressing throughout the day for an increase in drainage. Keep the dressing dry for 24 hours. Replace with a Band-Aid if necessary. You may shower 24 hours after the procedure. Do not smoke for 24 hours after the procedure. Call your physician for a follow-up appointment.   If any questions or complications develop after you go home, please call the Radiology Department at [phone]

## 2022-11-30 NOTE — OR NURSING
Paracentesis by Dr Gabo Whitt    PT TRANSPORTED FROM:   [address] IR ROOM:   Small       BARRIER PRECAUTIONS & STERILE TECHNIQUE:               Pt placed on VS Monitor. Pt prepped and draped in a sterile fashion with chlorhexadine.     PAIN/LOCAL ANESTHESIA/SEDATION MANAGEMENT:           Local: Lidocaine 1% given by Dr          Sedation: none             Fentanyl:             Versed:   Albumin 50g given IVPB  INTRAOPERATIVE:           ACCESS TIME:           US/FLUORO: U/S guided          WIRE USED:           SHEATH USED:           CATHETER USED:           FINAL IMAGE TAKEN TO CONFIRM PLACEMENT OF:           CONTRAST/CC:     STERILE DRESSINGS: Gauze with tegaderm    SPECIMENS: Ascitic fluid: cc creamy yellow removed    EBL:    <9AO        COMPLICATIONS/ OUTCOME:   None        REPORT CALLED TO: Given @ bedside to Aleksandr Robb

## 2022-12-02 ENCOUNTER — CARE COORDINATION (OUTPATIENT)
Dept: CASE MANAGEMENT | Age: 77
End: 2022-12-02

## 2022-12-02 NOTE — CARE COORDINATION
[facility] Care Transitions Follow Up Call      Patient: Hoang Barry  Patient : 1945   MRN: [id number]  Reason for Admission: AMS-confusion  Discharge Date: 11/15/22 RARS: Readmission Risk Score: 30.7    Attempted to contact patient for care transition follow up. Unable to reach patient. Phone line rang multiple times and then disconnected. Also attempted to call home number listed. Recording states the wireless customer that you are trying to reach is not available, please try again later. CTN will call back at a later time.       Nadine Beaver RN

## 2022-12-05 ENCOUNTER — CARE COORDINATION (OUTPATIENT)
Dept: CASE MANAGEMENT | Age: 77
End: 2022-12-05

## 2022-12-05 NOTE — CARE COORDINATION
[facility] Care Transitions Follow Up Call    Patient: Salma Roca  Patient : 1945   MRN: [id number]  Reason for Admission: AMS-confusion  Discharge Date: 11/15/22 RARS: Readmission Risk Score: 30.7    2nd attempt to contact patient for care transition follow up. Unable to reach patient. Phone line rang multiple times and then disconnected when calling mobile number listed. Also attempted to contact home number listed. Recording states that the wireless customer that you are trying to reach is not available, please try again later. Episode to be resolved and CTN to sign off if return call is not received from the patient.       Ann Marie Baron RN

## 2022-12-06 ENCOUNTER — OFFICE VISIT (OUTPATIENT)
Dept: PULMONOLOGY | Age: 77
End: 2022-12-06
Payer: COMMERCIAL

## 2022-12-06 ENCOUNTER — HOSPITAL ENCOUNTER (OUTPATIENT)
Age: 77
Discharge: HOME | End: 2022-12-06
Payer: COMMERCIAL

## 2022-12-06 ENCOUNTER — HOSPITAL ENCOUNTER (OUTPATIENT)
Dept: GENERAL RADIOLOGY | Age: 77
Discharge: HOME | End: 2022-12-06
Payer: COMMERCIAL

## 2022-12-06 VITALS
HEART RATE: 69 BPM | RESPIRATION RATE: 16 BRPM | BODY MASS INDEX: 34.56 KG/M2 | OXYGEN SATURATION: 94 % | HEIGHT: 68 IN | WEIGHT: 228 LBS

## 2022-12-06 DIAGNOSIS — G47.34 SLEEP RELATED HYPOXIA: ICD-10-CM

## 2022-12-06 DIAGNOSIS — E66.9 OBESITY (BMI 30-39.9): ICD-10-CM

## 2022-12-06 DIAGNOSIS — R09.02 HYPOXIA: ICD-10-CM

## 2022-12-06 DIAGNOSIS — J90 RECURRENT PLEURAL EFFUSION ON RIGHT: ICD-10-CM

## 2022-12-06 LAB
ALBUMIN SERPL-MCNC: 3.4 GM/DL (ref 3.4–5)
ALP BLD-CCNC: 109 IU/L (ref 40–128)
ALT SERPL-CCNC: 35 U/L (ref 10–40)
AMMONIA: 62 UMOL/L (ref 16–60)
ANION GAP SERPL CALCULATED.3IONS-SCNC: 16 MMOL/L (ref 4–16)
AST SERPL-CCNC: 59 IU/L (ref 15–37)
BASOPHILS ABSOLUTE: 0.1 K/CU MM
BASOPHILS RELATIVE PERCENT: 1.2 % (ref 0–1)
BILIRUB SERPL-MCNC: 0.7 MG/DL (ref 0–1)
BUN BLDV-MCNC: 27 MG/DL (ref 6–23)
CALCIUM SERPL-MCNC: 10.3 MG/DL (ref 8.3–10.6)
CHLORIDE BLD-SCNC: 98 MMOL/L (ref 99–110)
CO2: 22 MMOL/L (ref 21–32)
CREAT SERPL-MCNC: 1.5 MG/DL (ref 0.9–1.3)
DIFFERENTIAL TYPE: ABNORMAL
EOSINOPHILS ABSOLUTE: 0.4 K/CU MM
EOSINOPHILS RELATIVE PERCENT: 5.6 % (ref 0–3)
ESTIMATED AVERAGE GLUCOSE: 111 MG/DL
GFR SERPL CREATININE-BSD FRML MDRD: 49 ML/MIN/1.73M2
GLUCOSE BLD-MCNC: 86 MG/DL (ref 70–99)
HBA1C MFR BLD: 5.5 % (ref 4.2–6.3)
HCT VFR BLD CALC: 40.5 % (ref 42–52)
HEMOGLOBIN: 13 GM/DL (ref 13.5–18)
IMMATURE NEUTROPHIL %: 0.2 % (ref 0–0.43)
LYMPHOCYTES ABSOLUTE: 0.8 K/CU MM
LYMPHOCYTES RELATIVE PERCENT: 12.7 % (ref 24–44)
MCH RBC QN AUTO: 32.4 PG (ref 27–31)
MCHC RBC AUTO-ENTMCNC: 32.1 % (ref 32–36)
MCV RBC AUTO: 101 FL (ref 78–100)
MONOCYTES ABSOLUTE: 0.9 K/CU MM
MONOCYTES RELATIVE PERCENT: 14 % (ref 0–4)
NUCLEATED RBC %: 0 %
PDW BLD-RTO: 13.8 % (ref 11.7–14.9)
PLATELET # BLD: 161 K/CU MM (ref 140–440)
PMV BLD AUTO: 10.8 FL (ref 7.5–11.1)
POTASSIUM SERPL-SCNC: 4.8 MMOL/L (ref 3.5–5.1)
RBC # BLD: 4.01 M/CU MM (ref 4.6–6.2)
SEGMENTED NEUTROPHILS ABSOLUTE COUNT: 4.4 K/CU MM
SEGMENTED NEUTROPHILS RELATIVE PERCENT: 66.3 % (ref 36–66)
SODIUM BLD-SCNC: 136 MMOL/L (ref 135–145)
TOTAL IMMATURE NEUTOROPHIL: 0.01 K/CU MM
TOTAL NUCLEATED RBC: 0 K/CU MM
TOTAL PROTEIN: 7.4 GM/DL (ref 6.4–8.2)
WBC # BLD: 6.6 K/CU MM (ref 4–10.5)

## 2022-12-06 PROCEDURE — 36415 COLL VENOUS BLD VENIPUNCTURE: CPT

## 2022-12-06 PROCEDURE — 83036 HEMOGLOBIN GLYCOSYLATED A1C: CPT

## 2022-12-06 PROCEDURE — 80053 COMPREHEN METABOLIC PANEL: CPT

## 2022-12-06 PROCEDURE — 71046 X-RAY EXAM CHEST 2 VIEWS: CPT

## 2022-12-06 PROCEDURE — 82140 ASSAY OF AMMONIA: CPT

## 2022-12-06 PROCEDURE — 85025 COMPLETE CBC W/AUTO DIFF WBC: CPT

## 2022-12-06 PROCEDURE — 99213 OFFICE O/P EST LOW 20 MIN: CPT | Performed by: INTERNAL MEDICINE

## 2022-12-06 ASSESSMENT — ENCOUNTER SYMPTOMS
COUGH: 0
EYE DISCHARGE: 0
SHORTNESS OF BREATH: 1
ABDOMINAL DISTENTION: 1
EYE ITCHING: 0
ABDOMINAL PAIN: 0
BACK PAIN: 0

## 2022-12-06 NOTE — PROGRESS NOTES
Keren March  1945  Referring Provider: Kaylynn Pittman MD    Subjective:     Chief Complaint   Patient presents with    Follow-up    Results       HPI  Brooke Walters is a 78 y.o. male has come back as a follow up. He has no cough, no phlegm, SOBOE. He is on 2 L.min of oxygen. He has no chest pain, no palpitations. He has hepatic hydrothorax and his last thoracentesis is done about 2 weeks ago. He is on Lasix and Spironolactone. He has no abd pain. Current Outpatient Medications   Medication Sig Dispense Refill    furosemide (LASIX) 40 MG tablet Take 1 tablet by mouth daily 60 tablet 3    rifAXIMin (XIFAXAN) 550 MG tablet Take 1 tablet by mouth 2 times daily 60 tablet 3    ALPRAZolam (XANAX) 0.5 MG tablet Take 1 tablet by mouth 2 times daily as needed for Sleep for up to 30 days. 30 tablet 0    levothyroxine (SYNTHROID) 50 MCG tablet Take 1 tablet by mouth Daily 90 tablet 1    albuterol sulfate HFA (PROVENTIL;VENTOLIN;PROAIR) 108 (90 Base) MCG/ACT inhaler INHALE 2 PUFFS BY MOUTH 4 TIMES DAILY AS NEEDED FOR WHEEZING 1 each 2    DULoxetine (CYMBALTA) 60 MG extended release capsule TAKE 1 CAPSULE BY MOUTH ONCE DAILY 90 capsule 1    betamethasone valerate (VALISONE) 0.1 % lotion Apply topically 2 times daily prn scalp rash 60 mL 3    spironolactone (ALDACTONE) 100 MG tablet Take 100 mg by mouth 2 times daily      lactulose (CHRONULAC) 10 GM/15ML solution Take 30 mLs by mouth 3 times daily Hold if having diarrhea 2700 mL 5    Multiple Vitamin (MULTI VITAMIN MENS PO) Take 1 tablet by mouth daily      ESOMEPRAZOLE MAGNESIUM PO Take 20 mg by mouth daily OTC      Omega-3 1000 MG CAPS Take 2,000 mg by mouth daily      nadolol (CORGARD) 40 MG tablet Take 1 tablet by mouth in the morning. (Patient taking differently: Take 40 mg by mouth nightly) 90 tablet 3     No current facility-administered medications for this visit.        Allergies   Allergen Reactions    Lisinopril Swelling     Tongue swelling      Zetia [Ezetimibe] Swelling     Facial swelling    Atorvastatin     Codeine Other (See Comments)     Blood pressure drops    Hydrochlorothiazide     Hydroxyzine      Fatigue and depressed    Lexapro [Escitalopram Oxalate]      Increased depression    Pravastatin        Past Medical History:   Diagnosis Date    Anxiety     Cirrhosis, alcoholic ([facility]     Diabetes mellitus ([facility] [address]     GERD (gastroesophageal reflux disease)     GERD (gastroesophageal reflux disease)     Hyperlipidemia     Hypertension     Portal hypertension (HCC)     Pulmonary nodules     Sleep apnea     SOB (shortness of breath)     Thyroid disease        Past Surgical History:   Procedure Laterality Date    APPENDECTOMY      CHOLECYSTECTOMY      COLONOSCOPY      ENDOSCOPY, COLON, DIAGNOSTIC      FOOT SURGERY      needle removed,not sure which foot, per wife. TONSILLECTOMY      UPPER GASTROINTESTINAL ENDOSCOPY N/A 2022    EGD BIOPSY performed by Jese Demarco MD at [address] [place] [address] History     Socioeconomic History    Marital status:      Spouse name: None    Number of children: None    Years of education: None    Highest education level: None   Occupational History     Comment: Retired    Tobacco Use    Smoking status: Former     Packs/day: 1.00     Years: 14.00     Pack years: 14.00     Types: Cigarettes     Quit date:      Years since quittin.9    Smokeless tobacco: Never   Vaping Use    Vaping Use: Never used   Substance and Sexual Activity    Alcohol use: Not Currently    Drug use: Never    Sexual activity: Yes     Partners: Female     Social Determinants of Health     Financial Resource Strain: Low Risk     Difficulty of Paying Living Expenses: Not hard at all   Food Insecurity: No Food Insecurity    Worried About Running Out of Food in the Last Year: Never true    Ran Out of Food in the Last Year: Never true       Review of Systems   Constitutional:  Negative for fatigue.    HENT:  Negative for congestion and postnasal drip. Eyes:  Negative for discharge and itching. Respiratory:  Positive for shortness of breath. Negative for cough. Cardiovascular:  Negative for chest pain and leg swelling. Gastrointestinal:  Positive for abdominal distention. Negative for abdominal pain. Endocrine: Negative for cold intolerance and heat intolerance. Genitourinary:  Negative for enuresis and frequency. Musculoskeletal:  Negative for arthralgias and back pain. Allergic/Immunologic: Negative for environmental allergies and food allergies. Neurological:  Negative for light-headedness and headaches. Hematological:  Negative for adenopathy. Psychiatric/Behavioral:  Negative for agitation and behavioral problems. Objective:   Pulse 69   Resp 16   Ht 5' 8\" (1.727 m)   Wt 228 lb (103.4 kg)   SpO2 94%   BMI 34.67 kg/m²   Body mass index is 34.67 kg/m². Sleep Medicine 9/1/2022 9/1/2022 8/15/2022 7/17/2019   Sitting and reading 2 - 2 3   Watching TV 2 - 3 2   Sitting, inactive in a public place (e.g. a theatre or a meeting) 2 - 1 2   As a passenger in a car for an hour without a break 2 - 2 3   Lying down to rest in the afternoon when circumstances permit 3 - 3 3   Sitting and talking to someone 1 - 0 0   Sitting quietly after a lunch without alcohol 2 - 2 3   In a car, while stopped for a few minutes in traffic 0 - 0 0   [place] Sleepiness Score 14 - 13 16   Neck circumference (Inches) 16.5 16.5 16.5 18.25     Mallampati 2    Physical Exam  Vitals reviewed. Constitutional:       Appearance: Normal appearance. HENT:      Head: Normocephalic and atraumatic. Nose: Nose normal.      Mouth/Throat:      Mouth: Mucous membranes are moist.   Eyes:      Extraocular Movements: Extraocular movements intact. Pupils: Pupils are equal, round, and reactive to light. Cardiovascular:      Rate and Rhythm: Normal rate and regular rhythm. Pulses: Normal pulses.       Heart sounds: Normal heart sounds. Pulmonary:      Effort: Pulmonary effort is normal.      Breath sounds: Normal breath sounds. Abdominal:      General: Abdomen is flat. Palpations: Abdomen is soft. Musculoskeletal:         General: Normal range of motion. Cervical back: Normal range of motion and neck supple. Skin:     General: Skin is warm and dry. Neurological:      General: No focal deficit present. Mental Status: He is alert and oriented to person, place, and time. Psychiatric:         Mood and Affect: Mood normal.         Behavior: Behavior normal.       Radiology: None    Assessment and Plan     Problem List          Respiratory    Recurrent pleural effusion on right      I'll do a CXR now and schedule for a thoracentesis         Relevant Orders    XR CHEST (2 VW)    IR THORACENTESIS PLEURAL W IMAGING    Hypoxia      He is on 2 L/min on walking          Sleep related hypoxia      C/w 2 L/min of oxygen in the night            Other    Obesity (BMI 30-39. 9)      Advised to loose weight with diet and exercise                   Follow-Up:    Return in about 4 weeks (around 1/3/2023).      Progress notes sent to the referring Provider    Walter Prieto MD MD  12/6/2022  3:13 PM

## 2022-12-07 NOTE — PROGRESS NOTES
IR Procedure at [address] [place] [address]:  Spoke with patient's wife Dexter Person and patient will arrive at 56 at [address] [place] [address] on 12/9/2022 for his procedure at 1230. (Paracentesis, Thoracentesis, Thyroid Bx and Injections may have a light breakfast)  2. Follow your directions as prescribed by the doctor for your procedure and medications. 3.   Consult your provider as to when to stop blood thinner  4. Do not take any pain medication within 6 hours of your procedure  5. Do not drink any alcoholic beverages or use any street drugs 24 hours before procedure. 6.   Please wear simple, loose fitting clothing to the hospital.  Do not bring valuables (money,             credit cards, checkbooks, etc.)     7. If you  have a Living Will and Durable Power of  for Healthcare, please bring in a copy. 8.   Please bring picture ID,  insurance card, paperwork from the doctors office            (H & P, Consent,  & card for implantable devices). 9.   Report to the information desk on the ground floor. 10. Take a shower the night before or morning of your procedure, do not apply any lotion, oil or powder. 11. If you are going to be sedated for the procedure, you will need a responsible adult to drive you home.

## 2022-12-09 ENCOUNTER — HOSPITAL ENCOUNTER (OUTPATIENT)
Dept: INTERVENTIONAL RADIOLOGY/VASCULAR | Age: 77
Discharge: HOME | End: 2022-12-09
Payer: COMMERCIAL

## 2022-12-09 ENCOUNTER — HOSPITAL ENCOUNTER (OUTPATIENT)
Dept: GENERAL RADIOLOGY | Age: 77
Discharge: HOME | End: 2022-12-09
Payer: COMMERCIAL

## 2022-12-09 VITALS
OXYGEN SATURATION: 96 % | HEART RATE: 60 BPM | WEIGHT: 228 LBS | RESPIRATION RATE: 20 BRPM | TEMPERATURE: 95.9 F | BODY MASS INDEX: 34.56 KG/M2 | DIASTOLIC BLOOD PRESSURE: 60 MMHG | HEIGHT: 68 IN | SYSTOLIC BLOOD PRESSURE: 115 MMHG

## 2022-12-09 DIAGNOSIS — J90 RECURRENT PLEURAL EFFUSION ON RIGHT: ICD-10-CM

## 2022-12-09 PROCEDURE — 2709999900 IR GUIDED THORACENTESIS PLEURAL

## 2022-12-09 PROCEDURE — 32555 ASPIRATE PLEURA W/ IMAGING: CPT

## 2022-12-09 PROCEDURE — 71045 X-RAY EXAM CHEST 1 VIEW: CPT

## 2022-12-09 PROCEDURE — 32555 ASPIRATE PLEURA W/ IMAGING: CPT | Performed by: INTERNAL MEDICINE

## 2022-12-09 RX ORDER — SODIUM CHLORIDE 0.9 % (FLUSH) 0.9 %
10 SYRINGE (ML) INJECTION PRN
Status: DISCONTINUED | OUTPATIENT
Start: 2022-12-09 | End: 2022-12-10 | Stop reason: HOSPADM

## 2022-12-09 NOTE — FLOWSHEET NOTE
Pt to pt  in wheelchair per RN for d/c home in private vehicle with spouse.  Dressing to right lung puncture site dry and intact at time of d/c

## 2022-12-09 NOTE — PROGRESS NOTES
To holding area. Assessment completed and questions answered. Call light in reach. Patient short of breath with accessory muscle use. Patient's right lobes of lungs very very diminished, almost absent. Patient resting in bed and instructed to not get up without assistance. Patient states he almost passed out yesterday.

## 2022-12-09 NOTE — PROGRESS NOTES
1250-patient returned from IR. Right thoracic site with tegaderm assessed per ALBINO PAUL RN. No needs voiced at this time. Call light within reach. Bed in the lowest position. Wife at bedside.

## 2022-12-09 NOTE — FLOWSHEET NOTE
Discharge instructions reviewed with patient and spouse. Voices understanding. Ambulated without difficulty.

## 2022-12-09 NOTE — OR NURSING
PROCEDURE PERFORMED: Right Thoracentesis    PRIMARY INDICATION FOR PROCEDURE: Pleural    INFORMED CONSENT:  Obtained prior to procedure. Consent placed in chart. ASSESSMENT: Pt alert and oriented x4. Pt verbalizes understanding of procedure. BARRIER PRECAUTIONS & STERILE TECHNIQUE:               Pt remains in bed and positioned to left side. Warm blankets given. Pt placed on Vitals Signs Monitor. Pt prepped and draped in a sterile fashion with chlorhexadine. PAIN/LOCAL ANESTHESIA/SEDATION MANAGEMENT:           Local: Lidocaine 1% given by Dr Norma Valera at 1220          Sedation:              Fentanyl:             Versed:     INTRAOPERATIVE:           ACCESS TIME: 1221          US/FLUORO: 3 Images          WIRE USED:           SHEATH USED:           CATHETER USED: One Step          FINAL IMAGE TAKEN TO CONFIRM PLACEMENT OF:           CONTRAST/CC:     A total of 2200 cc of chyle colored pleural fluid  removed.     STERILE DRESSINGS: 4x4 gauze and tegaderm    SPECIMENS: no orders    EBL:     Less than 1 cc     FOLLOW- UP X-RAY: ordered    COMPLICATIONS: none    REPORT CALLED TO: given bedside to RN of Room 14

## 2022-12-09 NOTE — DISCHARGE INSTRUCTIONS
-Keep dressing dry and intact x2 days to right lung puncture site    -Monitor for signs and symptoms of infection such as: new onset fever/chills, redness/discolored drainage at site, etc. Seek medical attention if these should occur    -If acute onset of shortness of breath occurs, seek medical attention

## 2022-12-10 NOTE — PROCEDURES
Shani Garay is a 78 y.o. male patient. 1. Recurrent pleural effusion on right      Past Medical History:   Diagnosis Date    Anxiety     Cirrhosis, alcoholic ([facility] [address]     Diabetes mellitus ([facility] [address]     GERD (gastroesophageal reflux disease) 2000    GERD (gastroesophageal reflux disease)     Hyperlipidemia     Hypertension     Portal hypertension (HCC)     Pulmonary nodules     Sleep apnea     SOB (shortness of breath)     Thyroid disease      Blood pressure 115/60, pulse 60, temperature (!) 95.9 °F (35.5 °C), temperature source Temporal, resp. rate 20, height 5' 8\" (1.727 m), weight 228 lb (103.4 kg), SpO2 96 %. PROCEDURE; RIGHT THORACENTESIS  INDICATION: RECURRENT RIGHT PLEURAL EFFUSION  ESTIMATED BLOOD LOSS: NONE    Thoracentesis    Date/Time: 12/9/2022 10:27 PM  Performed by: Jv Lindquist MD  Authorized by: Jv Lindquist MD   Consent: Verbal consent obtained. Written consent obtained. Risks and benefits: risks, benefits and alternatives were discussed  Consent given by: patient and spouse  Patient understanding: patient states understanding of the procedure being performed  Patient consent: the patient's understanding of the procedure matches consent given  Procedure consent: procedure consent matches procedure scheduled  Relevant documents: relevant documents present and verified  Test results: test results available and properly labeled  Site marked: the operative site was marked  Imaging studies: imaging studies available  Patient identity confirmed: verbally with patient and arm band  Procedure purpose: therapeutic  Indications: pleural effusion  Preparation: Patient was prepped and draped in the usual sterile fashion.   Local anesthesia used: yes    Anesthesia:  Local anesthesia used: yes  Local Anesthetic: lidocaine 1% without epinephrine  Anesthetic total: 10 mL    Sedation:  Patient sedated: no    Preparation: skin prepped with ChloraPrep  Patient position: left lateral decubitus  Ultrasound

## 2022-12-12 NOTE — PROGRESS NOTES
IR Procedure at [facility]:  Spoke with patient's wife Veronica Dumont and patient will arrive at 56 at [facility]  on 12/14/2022 for his procedure at 1230. (Paracentesis, Thoracentesis, Thyroid Bx and Injections may have a light breakfast)  2. Follow your directions as prescribed by the doctor for your procedure and medications. 3.   Consult your provider as to when to stop blood thinner  4. Do not take any pain medication within 6 hours of your procedure  5. Do not drink any alcoholic beverages or use any street drugs 24 hours before procedure. 6.   Please wear simple, loose fitting clothing to the hospital.  Do not bring valuables (money,             credit cards, checkbooks, etc.)     7. If you  have a Living Will and Durable Power of  for Healthcare, please bring in a copy. 8.   Please bring picture ID,  insurance card, paperwork from the doctors office            (H & P, Consent,  & card for implantable devices). 9.   Report to the information desk on the ground floor. 10. Take a shower the night before or morning of your procedure, do not apply any lotion, oil or powder. 11. If you are going to be sedated for the procedure, you will need a responsible adult to drive you home.

## 2022-12-14 ENCOUNTER — HOSPITAL ENCOUNTER (OUTPATIENT)
Dept: INTERVENTIONAL RADIOLOGY/VASCULAR | Age: 77
Discharge: HOME | End: 2022-12-14
Payer: COMMERCIAL

## 2022-12-14 VITALS
SYSTOLIC BLOOD PRESSURE: 134 MMHG | WEIGHT: 230 LBS | BODY MASS INDEX: 34.86 KG/M2 | RESPIRATION RATE: 18 BRPM | TEMPERATURE: 97.8 F | DIASTOLIC BLOOD PRESSURE: 64 MMHG | HEIGHT: 68 IN | OXYGEN SATURATION: 98 % | HEART RATE: 66 BPM

## 2022-12-14 DIAGNOSIS — K74.69 OTHER CIRRHOSIS OF LIVER (HCC): ICD-10-CM

## 2022-12-14 PROCEDURE — 7100000011 HC PHASE II RECOVERY - ADDTL 15 MIN

## 2022-12-14 PROCEDURE — 7100000010 HC PHASE II RECOVERY - FIRST 15 MIN

## 2022-12-14 PROCEDURE — 49083 ABD PARACENTESIS W/IMAGING: CPT

## 2022-12-14 PROCEDURE — C1729 CATH, DRAINAGE: HCPCS

## 2022-12-14 RX ORDER — SODIUM CHLORIDE 0.9 % (FLUSH) 0.9 %
10 SYRINGE (ML) INJECTION PRN
Status: DISCONTINUED | OUTPATIENT
Start: 2022-12-14 | End: 2022-12-15 | Stop reason: HOSPADM

## 2022-12-14 RX ADMIN — Medication 10 ML: at 11:02

## 2022-12-14 ASSESSMENT — PAIN - FUNCTIONAL ASSESSMENT: PAIN_FUNCTIONAL_ASSESSMENT: 0-10

## 2022-12-14 NOTE — PROGRESS NOTES
1303  Patient arrived back to [facility]. Report given to this nurse from  [facility], [facility]. Patient A&O  No pain. Beverage of choice offered to patient. Call light in reach and bed in lowest position. 1327 IV removed. 1327 Discharge instructions given to Petar Marrufo. 1327 Patient sitting on side of bed getting dressed assisted by Anny Garcia.   1330 Patient escorted to car via wheelchair transported home by Anny Garcia

## 2022-12-14 NOTE — DISCHARGE INSTRUCTIONS
[facility]    Patient Discharge Instructions Abdominal Fluid Drainage      You have had had an Abdominal Fluid Drainage in the Radiology Department. Below are guidelines for you to follow after your test is completed:    Go home and rest quietly for the remainder of the day. DO NOT drive, operate appliances, or make any legal decisions today. You may resume normal activities tomorrow. Have a responsible adult drive you home and remain with you for the remainder of the day. You may resume your normal diet after the procedure. Avoid alcoholic beverages and depressant drugs for 24 hours. You may resume your previous medications unless directed not to by your physician or the Radiologist. Ardeth File may use Tylenol (one or two tablets every four to six hours) for discomfort at the puncture site. If you develop severe pain, swelling, or redness at the site, call the Radiology Department. Call your physician immediately if you develop a rapid pulse (greater than 100 beats per minute), feel faint, sweat profusely, experience a sudden onset of weakness, or experience increased pain or swelling or saturation of dressing at the puncture site. Call your physician immediately if you develop a sudden onset of rapid breathing (greater than 20 breaths per minute), upper back or chest pain, shortness of breath, sweating, skin color change, or a sudden onset of anxiety. Report a temperature greater than 101 degrees Fahrenheit (38.8 degrees Celsius) to us or to your physician. Check the dressing throughout the day for an increase in drainage. Keep the dressing dry for 24 hours. Replace with a Band-Aid if necessary. You may shower 24 hours after the procedure. Do not smoke for 24 hours after the procedure. Call your physician for a follow-up appointment.   If any questions or complications develop after you go home, please call the Radiology Department at [phone]          Watch for signs of infection    Elevated temperature, redness or pain at site,   Drainage at incision,

## 2022-12-14 NOTE — OR NURSING
Paracentesis by Dr Rossi Lewis    PT TRANSPORTED FROM:  [facility]                                  TO THE IR ROOM: Small         BARRIER PRECAUTIONS & STERILE TECHNIQUE:               Pt placed on VS Monitor. Pt prepped and draped in a sterile fashion with chlorhexadine.     PAIN/LOCAL ANESTHESIA/SEDATION MANAGEMENT:           Local: Lidocaine 1% given by Dr          Sedation: None             Fentanyl:             Versed:     INTRAOPERATIVE:           ACCESS TIME:           US/FLUORO: U/S guided          WIRE USED:           SHEATH USED:           CATHETER USED:           FINAL IMAGE TAKEN TO CONFIRM PLACEMENT OF:           CONTRAST/CC:     STERILE DRESSINGS: Gauze with tegaderm    SPECIMENS: Ascitic fluid: 5350cc cream yellow removed    EBL:  <7YD          COMPLICATIONS/ OUTCOME:  None

## 2022-12-20 NOTE — PROGRESS NOTES
IR Procedure at [facility]:  Spoke with patient's wife Alyx Lucia and patient will arrive at 200 at [facility] on 12/28/2022 for his procedure at 1430. ADDENDUM: Spoke with patient's wife Alyx Lucia and patient will arrive at 0930 at [facility] on 12/28/2022 for his procedure at 1130. (Paracentesis, Thoracentesis, Thyroid Bx and Injections may have a light breakfast)  2. Follow your directions as prescribed by the doctor for your procedure and medications. 3.   Consult your provider as to when to stop blood thinner  4. Do not take any pain medication within 6 hours of your procedure  5. Do not drink any alcoholic beverages or use any street drugs 24 hours before procedure. 6.   Please wear simple, loose fitting clothing to the hospital.  Do not bring valuables (money,             credit cards, checkbooks, etc.)     7. If you  have a Living Will and Durable Power of  for Healthcare, please bring in a copy. 8.   Please bring picture ID,  insurance card, paperwork from the doctors office            (H & P, Consent,  & card for implantable devices). 9.   Report to the information desk on the ground floor. 10. Take a shower the night before or morning of your procedure, do not apply any lotion, oil or powder. 11. If you are going to be sedated for the procedure, you will need a responsible adult to drive you home.

## 2022-12-28 ENCOUNTER — HOSPITAL ENCOUNTER (OUTPATIENT)
Dept: INTERVENTIONAL RADIOLOGY/VASCULAR | Age: 77
Discharge: HOME | End: 2022-12-28
Payer: COMMERCIAL

## 2022-12-28 VITALS
RESPIRATION RATE: 18 BRPM | SYSTOLIC BLOOD PRESSURE: 119 MMHG | HEART RATE: 70 BPM | TEMPERATURE: 98.1 F | OXYGEN SATURATION: 94 % | DIASTOLIC BLOOD PRESSURE: 53 MMHG

## 2022-12-28 DIAGNOSIS — K74.60 CIRRHOSIS OF LIVER WITH ASCITES, UNSPECIFIED HEPATIC CIRRHOSIS TYPE (HCC): ICD-10-CM

## 2022-12-28 DIAGNOSIS — R18.8 CIRRHOSIS OF LIVER WITH ASCITES, UNSPECIFIED HEPATIC CIRRHOSIS TYPE (HCC): ICD-10-CM

## 2022-12-28 LAB
APTT: 33 SECONDS (ref 25.1–37.1)
HCT VFR BLD CALC: 38.3 % (ref 42–52)
HEMOGLOBIN: 12.5 GM/DL (ref 13.5–18)
INR BLD: 0.96 INDEX
MCH RBC QN AUTO: 32.2 PG (ref 27–31)
MCHC RBC AUTO-ENTMCNC: 32.6 % (ref 32–36)
MCV RBC AUTO: 98.7 FL (ref 78–100)
PDW BLD-RTO: 13.3 % (ref 11.7–14.9)
PLATELET # BLD: 175 K/CU MM (ref 140–440)
PMV BLD AUTO: 10.2 FL (ref 7.5–11.1)
PROTHROMBIN TIME: 12.4 SECONDS (ref 11.7–14.5)
RBC # BLD: 3.88 M/CU MM (ref 4.6–6.2)
WBC # BLD: 7.4 K/CU MM (ref 4–10.5)

## 2022-12-28 PROCEDURE — 6360000002 HC RX W HCPCS: Performed by: RADIOLOGY

## 2022-12-28 PROCEDURE — 49083 ABD PARACENTESIS W/IMAGING: CPT

## 2022-12-28 PROCEDURE — 85730 THROMBOPLASTIN TIME PARTIAL: CPT

## 2022-12-28 PROCEDURE — 7100000011 HC PHASE II RECOVERY - ADDTL 15 MIN

## 2022-12-28 PROCEDURE — 6360000002 HC RX W HCPCS

## 2022-12-28 PROCEDURE — 2709999900 IR US GUIDED PARACENTESIS

## 2022-12-28 PROCEDURE — P9047 ALBUMIN (HUMAN), 25%, 50ML: HCPCS

## 2022-12-28 PROCEDURE — 85027 COMPLETE CBC AUTOMATED: CPT

## 2022-12-28 PROCEDURE — 2580000003 HC RX 258

## 2022-12-28 PROCEDURE — 85610 PROTHROMBIN TIME: CPT

## 2022-12-28 PROCEDURE — P9047 ALBUMIN (HUMAN), 25%, 50ML: HCPCS | Performed by: RADIOLOGY

## 2022-12-28 PROCEDURE — 7100000010 HC PHASE II RECOVERY - FIRST 15 MIN

## 2022-12-28 RX ORDER — ALBUMIN (HUMAN) 12.5 G/50ML
SOLUTION INTRAVENOUS CONTINUOUS PRN
Status: DISCONTINUED | OUTPATIENT
Start: 2022-12-28 | End: 2022-12-29 | Stop reason: HOSPADM

## 2022-12-28 RX ADMIN — ALBUMIN (HUMAN) 25 G: 0.25 INJECTION, SOLUTION INTRAVENOUS at 11:56

## 2022-12-28 ASSESSMENT — PAIN SCALES - GENERAL: PAINLEVEL_OUTOF10: 0

## 2022-12-28 ASSESSMENT — PAIN - FUNCTIONAL ASSESSMENT: PAIN_FUNCTIONAL_ASSESSMENT: 0-10

## 2022-12-28 NOTE — PROGRESS NOTES
1202 Pt. Brought back to unit, report received from IR nurse via telephone. Pt. VSS, denies pain or discomfort at this time. Call light in each. Puncture site is C/D/I. Wife at bedside. 1205 Pt. Refused anything to eat/ drink at this time. DC instructions reviewed with pt and wife, all parties express understanding. 1235 Pt. To DC home in private vehicle.

## 2022-12-28 NOTE — DISCHARGE INSTRUCTIONS
[facility]    Patient Discharge Instructions Abdominal Fluid Drainage      You have had had an Abdominal Fluid Drainage in the Radiology Department. Below are guidelines for you to follow after your test is completed:    Go home and rest quietly for the remainder of the day. DO NOT drive, operate appliances, or make any legal decisions today. You may resume normal activities tomorrow. Have a responsible adult drive you home and remain with you for the remainder of the day. You may resume your normal diet after the procedure. Avoid alcoholic beverages and depressant drugs for 24 hours. You may resume your previous medications unless directed not to by your physician or the Radiologist. Megan Espinoza may use Tylenol (one or two tablets every four to six hours) for discomfort at the puncture site. If you develop severe pain, swelling, or redness at the site, call the Radiology Department. Call your physician immediately if you develop a rapid pulse (greater than 100 beats per minute), feel faint, sweat profusely, experience a sudden onset of weakness, or experience increased pain or swelling or saturation of dressing at the puncture site. Call your physician immediately if you develop a sudden onset of rapid breathing (greater than 20 breaths per minute), upper back or chest pain, shortness of breath, sweating, skin color change, or a sudden onset of anxiety. Report a temperature greater than 101 degrees Fahrenheit (38.8 degrees Celsius) to us or to your physician. Check the dressing throughout the day for an increase in drainage. Keep the dressing dry for 24 hours. Replace with a Band-Aid if necessary. You may shower 24 hours after the procedure. Do not smoke for 24 hours after the procedure. Call your physician for a follow-up appointment.   If any questions or complications develop after you go home, please call the Radiology Department at [phone]

## 2022-12-28 NOTE — OR NURSING
PROCEDURE PERFORMED: Paracentesis    PRIMARY INDICATION FOR PROCEDURE: ascites    INFORMED CONSENT:  Obtained prior to procedure. Consent placed in chart. PT TRANSPORTED FROM:      hospitals    TO THE IR ROOM:    Small room                     ARRIVED TO ROOM: 1122    ASSESSMENT: A &O x4 and VSS        BARRIER PRECAUTIONS & STERILE TECHNIQUE:               Pt positioned supine for comfort. Warm blankets given. Pt placed on Cardiac Monitor. Pt prepped and draped in a sterile fashion with chlorhexadine. TIME OUT:  6096    PAIN/LOCAL ANESTHESIA/SEDATION MANAGEMENT:           Local: Lidocaine 1% given by  @ 1129            INTRAOPERATIVE:           ACCESS TIME: 1131            US/FLUORO:  US x3 images                   CATHETER USED: One step           STERILE DRESSINGS: gauze and tegaderm     SPECIMENS: 6800 cloudy  fluid removed     EBL:   >0SD    COMPLICATIONS/ OUTCOME: Patient remained Alert with Stable VS throughout procedure . 25G of 25% albumin given.          LEFT THE ROOM: 1156    REPORT CALLED TO: Jeaneth bAarca RN [id number] @ 6445

## 2023-01-04 ENCOUNTER — APPOINTMENT (OUTPATIENT)
Dept: CT IMAGING | Age: 78
DRG: 442 | End: 2023-01-04
Payer: MEDICARE

## 2023-01-04 ENCOUNTER — APPOINTMENT (OUTPATIENT)
Dept: GENERAL RADIOLOGY | Age: 78
DRG: 442 | End: 2023-01-04
Payer: MEDICARE

## 2023-01-04 ENCOUNTER — HOSPITAL ENCOUNTER (INPATIENT)
Age: 78
LOS: 7 days | Discharge: HOME | DRG: 442 | End: 2023-01-11
Attending: EMERGENCY MEDICINE | Admitting: STUDENT IN AN ORGANIZED HEALTH CARE EDUCATION/TRAINING PROGRAM
Payer: MEDICARE

## 2023-01-04 DIAGNOSIS — G93.40 ACUTE ENCEPHALOPATHY: ICD-10-CM

## 2023-01-04 DIAGNOSIS — R41.82 ALTERED MENTAL STATUS, UNSPECIFIED ALTERED MENTAL STATUS TYPE: Primary | ICD-10-CM

## 2023-01-04 DIAGNOSIS — N18.30 STAGE 3 CHRONIC KIDNEY DISEASE, UNSPECIFIED WHETHER STAGE 3A OR 3B CKD (HCC): ICD-10-CM

## 2023-01-04 DIAGNOSIS — J90 RECURRENT RIGHT PLEURAL EFFUSION: ICD-10-CM

## 2023-01-04 LAB
ALBUMIN SERPL-MCNC: 2.7 GM/DL (ref 3.4–5)
ALBUMIN SERPL-MCNC: 2.9 GM/DL (ref 3.4–5)
ALP BLD-CCNC: 81 IU/L (ref 40–129)
ALP BLD-CCNC: 89 IU/L (ref 40–129)
ALT SERPL-CCNC: 25 U/L (ref 10–40)
ALT SERPL-CCNC: 29 U/L (ref 10–40)
AMMONIA: 54 UMOL/L (ref 16–60)
ANION GAP SERPL CALCULATED.3IONS-SCNC: 11 MMOL/L (ref 4–16)
APTT: 20.8 SECONDS (ref 25.1–37.1)
AST SERPL-CCNC: 38 IU/L (ref 15–37)
AST SERPL-CCNC: 45 IU/L (ref 15–37)
BACTERIA: NEGATIVE /HPF
BASE EXCESS MIXED: 5.7 (ref 0–1.2)
BASOPHILS ABSOLUTE: 0.1 K/CU MM
BASOPHILS RELATIVE PERCENT: 0.7 % (ref 0–1)
BILIRUB SERPL-MCNC: 0.9 MG/DL (ref 0–1)
BILIRUB SERPL-MCNC: 1.1 MG/DL (ref 0–1)
BILIRUBIN DIRECT: 0.2 MG/DL (ref 0–0.3)
BILIRUBIN URINE: NEGATIVE MG/DL
BILIRUBIN, INDIRECT: 0.9 MG/DL (ref 0–0.7)
BLOOD, URINE: ABNORMAL
BUN BLDV-MCNC: 31 MG/DL (ref 6–23)
CALCIUM SERPL-MCNC: 9.5 MG/DL (ref 8.3–10.6)
CHLORIDE BLD-SCNC: 101 MMOL/L (ref 99–110)
CLARITY: CLEAR
CO2: 24 MMOL/L (ref 21–32)
COLOR: YELLOW
COMMENT: ABNORMAL
CREAT SERPL-MCNC: 1.5 MG/DL (ref 0.9–1.3)
DIFFERENTIAL TYPE: ABNORMAL
EKG ATRIAL RATE: 69 BPM
EKG DIAGNOSIS: NORMAL
EKG P AXIS: 20 DEGREES
EKG P-R INTERVAL: 156 MS
EKG Q-T INTERVAL: 430 MS
EKG QRS DURATION: 86 MS
EKG QTC CALCULATION (BAZETT): 460 MS
EKG R AXIS: 62 DEGREES
EKG T AXIS: 25 DEGREES
EKG VENTRICULAR RATE: 69 BPM
EOSINOPHILS ABSOLUTE: 0.3 K/CU MM
EOSINOPHILS RELATIVE PERCENT: 3.9 % (ref 0–3)
GFR SERPL CREATININE-BSD FRML MDRD: 48 ML/MIN/1.73M2
GLUCOSE BLD-MCNC: 110 MG/DL (ref 70–99)
GLUCOSE, URINE: NEGATIVE MG/DL
HCO3 VENOUS: 32.1 MMOL/L (ref 19–25)
HCT VFR BLD CALC: 36.8 % (ref 42–52)
HEMOGLOBIN: 12.3 GM/DL (ref 13.5–18)
IMMATURE NEUTROPHIL %: 0.1 % (ref 0–0.43)
INR BLD: 0.96 INDEX
KETONES, URINE: NEGATIVE MG/DL
LACTATE: 1.1 MMOL/L (ref 0.5–1.9)
LEUKOCYTE ESTERASE, URINE: NEGATIVE
LIPASE: 35 IU/L (ref 13–60)
LYMPHOCYTES ABSOLUTE: 0.7 K/CU MM
LYMPHOCYTES RELATIVE PERCENT: 9.9 % (ref 24–44)
MAGNESIUM: 2.1 MG/DL (ref 1.8–2.4)
MCH RBC QN AUTO: 32.5 PG (ref 27–31)
MCHC RBC AUTO-ENTMCNC: 33.4 % (ref 32–36)
MCV RBC AUTO: 97.1 FL (ref 78–100)
MONOCYTES ABSOLUTE: 0.9 K/CU MM
MONOCYTES RELATIVE PERCENT: 11.5 % (ref 0–4)
NITRITE URINE, QUANTITATIVE: NEGATIVE
NUCLEATED RBC %: 0 %
O2 SAT, VEN: 90.7 % (ref 50–70)
PCO2, VEN: 53 MMHG (ref 38–52)
PDW BLD-RTO: 13.2 % (ref 11.7–14.9)
PH VENOUS: 7.39 (ref 7.32–7.42)
PH, URINE: 6.5 (ref 5–8)
PLATELET # BLD: 166 K/CU MM (ref 140–440)
PMV BLD AUTO: 10.5 FL (ref 7.5–11.1)
PO2, VEN: 67 MMHG (ref 28–48)
POTASSIUM SERPL-SCNC: 4.6 MMOL/L (ref 3.5–5.1)
PRO-BNP: 622.7 PG/ML
PROTEIN UA: NEGATIVE MG/DL
PROTHROMBIN TIME: 12.4 SECONDS (ref 11.7–14.5)
RAPID INFLUENZA  B AGN: NEGATIVE
RAPID INFLUENZA A AGN: NEGATIVE
RBC # BLD: 3.79 M/CU MM (ref 4.6–6.2)
RBC URINE: 1 /HPF (ref 0–3)
SARS-COV-2, NAAT: NOT DETECTED
SEGMENTED NEUTROPHILS ABSOLUTE COUNT: 5.5 K/CU MM
SEGMENTED NEUTROPHILS RELATIVE PERCENT: 73.9 % (ref 36–66)
SODIUM BLD-SCNC: 136 MMOL/L (ref 135–145)
SOURCE: NORMAL
SPECIFIC GRAVITY UA: 1.01 (ref 1–1.03)
SQUAMOUS EPITHELIAL: <1 /HPF
T4 FREE: 1.03 NG/DL (ref 0.9–1.8)
TOTAL IMMATURE NEUTOROPHIL: 0.01 K/CU MM
TOTAL NUCLEATED RBC: 0 K/CU MM
TOTAL PROTEIN: 7.1 GM/DL (ref 6.4–8.2)
TOTAL PROTEIN: 7.7 GM/DL (ref 6.4–8.2)
TRICHOMONAS: ABNORMAL /HPF
TROPONIN T: 0.01 NG/ML
TROPONIN T: 0.01 NG/ML
TSH HIGH SENSITIVITY: 1.08 UIU/ML (ref 0.27–4.2)
UROBILINOGEN, URINE: 0.2 MG/DL (ref 0.2–1)
WBC # BLD: 7.5 K/CU MM (ref 4–10.5)
WBC UA: 1 /HPF (ref 0–2)

## 2023-01-04 PROCEDURE — 87150 DNA/RNA AMPLIFIED PROBE: CPT

## 2023-01-04 PROCEDURE — 70498 CT ANGIOGRAPHY NECK: CPT

## 2023-01-04 PROCEDURE — 87186 SC STD MICRODIL/AGAR DIL: CPT

## 2023-01-04 PROCEDURE — 84443 ASSAY THYROID STIM HORMONE: CPT

## 2023-01-04 PROCEDURE — 93005 ELECTROCARDIOGRAM TRACING: CPT | Performed by: EMERGENCY MEDICINE

## 2023-01-04 PROCEDURE — 85730 THROMBOPLASTIN TIME PARTIAL: CPT

## 2023-01-04 PROCEDURE — 84439 ASSAY OF FREE THYROXINE: CPT

## 2023-01-04 PROCEDURE — 84484 ASSAY OF TROPONIN QUANT: CPT

## 2023-01-04 PROCEDURE — 74177 CT ABD & PELVIS W/CONTRAST: CPT

## 2023-01-04 PROCEDURE — 93010 ELECTROCARDIOGRAM REPORT: CPT | Performed by: INTERNAL MEDICINE

## 2023-01-04 PROCEDURE — 83690 ASSAY OF LIPASE: CPT

## 2023-01-04 PROCEDURE — 85610 PROTHROMBIN TIME: CPT

## 2023-01-04 PROCEDURE — 1200000000 HC SEMI PRIVATE

## 2023-01-04 PROCEDURE — 85025 COMPLETE CBC W/AUTO DIFF WBC: CPT

## 2023-01-04 PROCEDURE — 83605 ASSAY OF LACTIC ACID: CPT

## 2023-01-04 PROCEDURE — 87635 SARS-COV-2 COVID-19 AMP PRB: CPT

## 2023-01-04 PROCEDURE — 2580000003 HC RX 258: Performed by: NURSE PRACTITIONER

## 2023-01-04 PROCEDURE — 83880 ASSAY OF NATRIURETIC PEPTIDE: CPT

## 2023-01-04 PROCEDURE — 6360000004 HC RX CONTRAST MEDICATION: Performed by: EMERGENCY MEDICINE

## 2023-01-04 PROCEDURE — 81003 URINALYSIS AUTO W/O SCOPE: CPT

## 2023-01-04 PROCEDURE — 82306 VITAMIN D 25 HYDROXY: CPT

## 2023-01-04 PROCEDURE — 80053 COMPREHEN METABOLIC PANEL: CPT

## 2023-01-04 PROCEDURE — 83735 ASSAY OF MAGNESIUM: CPT

## 2023-01-04 PROCEDURE — 82140 ASSAY OF AMMONIA: CPT

## 2023-01-04 PROCEDURE — 99285 EMERGENCY DEPT VISIT HI MDM: CPT

## 2023-01-04 PROCEDURE — 6370000000 HC RX 637 (ALT 250 FOR IP): Performed by: NURSE PRACTITIONER

## 2023-01-04 PROCEDURE — 87804 INFLUENZA ASSAY W/OPTIC: CPT

## 2023-01-04 PROCEDURE — 80076 HEPATIC FUNCTION PANEL: CPT

## 2023-01-04 PROCEDURE — 6360000002 HC RX W HCPCS: Performed by: NURSE PRACTITIONER

## 2023-01-04 PROCEDURE — 70450 CT HEAD/BRAIN W/O DYE: CPT

## 2023-01-04 PROCEDURE — 82805 BLOOD GASES W/O2 SATURATION: CPT

## 2023-01-04 PROCEDURE — 80307 DRUG TEST PRSMV CHEM ANLYZR: CPT

## 2023-01-04 PROCEDURE — 84425 ASSAY OF VITAMIN B-1: CPT

## 2023-01-04 PROCEDURE — 71045 X-RAY EXAM CHEST 1 VIEW: CPT

## 2023-01-04 PROCEDURE — 87040 BLOOD CULTURE FOR BACTERIA: CPT

## 2023-01-04 RX ORDER — FUROSEMIDE 40 MG/1
40 TABLET ORAL DAILY
Status: DISCONTINUED | OUTPATIENT
Start: 2023-01-05 | End: 2023-01-11 | Stop reason: HOSPADM

## 2023-01-04 RX ORDER — NADOLOL 20 MG/1
40 TABLET ORAL DAILY
Status: DISCONTINUED | OUTPATIENT
Start: 2023-01-04 | End: 2023-01-11 | Stop reason: HOSPADM

## 2023-01-04 RX ORDER — SODIUM CHLORIDE 0.9 % (FLUSH) 0.9 %
5-40 SYRINGE (ML) INJECTION PRN
Status: DISCONTINUED | OUTPATIENT
Start: 2023-01-04 | End: 2023-01-11 | Stop reason: HOSPADM

## 2023-01-04 RX ORDER — OMEGA-3-ACID ETHYL ESTERS 1 G/1
2000 CAPSULE, LIQUID FILLED ORAL DAILY
Status: DISCONTINUED | OUTPATIENT
Start: 2023-01-04 | End: 2023-01-11 | Stop reason: HOSPADM

## 2023-01-04 RX ORDER — ONDANSETRON 4 MG/1
4 TABLET, ORALLY DISINTEGRATING ORAL EVERY 8 HOURS PRN
Status: DISCONTINUED | OUTPATIENT
Start: 2023-01-04 | End: 2023-01-11 | Stop reason: HOSPADM

## 2023-01-04 RX ORDER — SODIUM CHLORIDE 0.9 % (FLUSH) 0.9 %
5-40 SYRINGE (ML) INJECTION EVERY 12 HOURS SCHEDULED
Status: DISCONTINUED | OUTPATIENT
Start: 2023-01-04 | End: 2023-01-11 | Stop reason: HOSPADM

## 2023-01-04 RX ORDER — ONDANSETRON 2 MG/ML
4 INJECTION INTRAMUSCULAR; INTRAVENOUS EVERY 6 HOURS PRN
Status: DISCONTINUED | OUTPATIENT
Start: 2023-01-04 | End: 2023-01-11 | Stop reason: HOSPADM

## 2023-01-04 RX ORDER — ALBUTEROL SULFATE 90 UG/1
2 AEROSOL, METERED RESPIRATORY (INHALATION) 4 TIMES DAILY
Status: DISCONTINUED | OUTPATIENT
Start: 2023-01-04 | End: 2023-01-05

## 2023-01-04 RX ORDER — SODIUM CHLORIDE 9 MG/ML
INJECTION, SOLUTION INTRAVENOUS PRN
Status: DISCONTINUED | OUTPATIENT
Start: 2023-01-04 | End: 2023-01-11 | Stop reason: HOSPADM

## 2023-01-04 RX ORDER — PANTOPRAZOLE SODIUM 40 MG/1
20 GRANULE, DELAYED RELEASE ORAL DAILY
Status: DISCONTINUED | OUTPATIENT
Start: 2023-01-05 | End: 2023-01-05

## 2023-01-04 RX ORDER — SPIRONOLACTONE 50 MG/1
100 TABLET, FILM COATED ORAL 2 TIMES DAILY
Status: DISCONTINUED | OUTPATIENT
Start: 2023-01-04 | End: 2023-01-11 | Stop reason: HOSPADM

## 2023-01-04 RX ORDER — DULOXETIN HYDROCHLORIDE 30 MG/1
60 CAPSULE, DELAYED RELEASE ORAL DAILY
Status: DISCONTINUED | OUTPATIENT
Start: 2023-01-05 | End: 2023-01-11 | Stop reason: HOSPADM

## 2023-01-04 RX ORDER — LEVOTHYROXINE SODIUM 0.05 MG/1
50 TABLET ORAL DAILY
Status: DISCONTINUED | OUTPATIENT
Start: 2023-01-05 | End: 2023-01-11 | Stop reason: HOSPADM

## 2023-01-04 RX ORDER — LACTULOSE 10 G/15ML
20 SOLUTION ORAL 3 TIMES DAILY
Status: DISCONTINUED | OUTPATIENT
Start: 2023-01-04 | End: 2023-01-11 | Stop reason: HOSPADM

## 2023-01-04 RX ORDER — LANOLIN ALCOHOL/MO/W.PET/CERES
100 CREAM (GRAM) TOPICAL DAILY
Status: DISCONTINUED | OUTPATIENT
Start: 2023-01-11 | End: 2023-01-05

## 2023-01-04 RX ADMIN — NADOLOL 40 MG: 20 TABLET ORAL at 22:45

## 2023-01-04 RX ADMIN — SPIRONOLACTONE 100 MG: 50 TABLET ORAL at 22:45

## 2023-01-04 RX ADMIN — SODIUM CHLORIDE, PRESERVATIVE FREE 10 ML: 5 INJECTION INTRAVENOUS at 22:46

## 2023-01-04 RX ADMIN — IOPAMIDOL 80 ML: 755 INJECTION, SOLUTION INTRAVENOUS at 17:10

## 2023-01-04 RX ADMIN — THIAMINE HYDROCHLORIDE 500 MG: 100 INJECTION, SOLUTION INTRAMUSCULAR; INTRAVENOUS at 22:40

## 2023-01-04 RX ADMIN — LACTULOSE 20 G: 20 SOLUTION ORAL at 22:45

## 2023-01-04 RX ADMIN — OMEGA-3-ACID ETHYL ESTERS 2000 MG: 1 CAPSULE, LIQUID FILLED ORAL at 22:47

## 2023-01-04 ASSESSMENT — PAIN SCALES - GENERAL: PAINLEVEL_OUTOF10: 0

## 2023-01-04 NOTE — ED NOTES
ED TO INPATIENT SBAR HANDOFF    Patient Name: Keren March   :  1945  78 y.o. MRN:  [id number]  Preferred Name  Brooke Walters  ED Room #:  ED31/ED-31  Family/Caregiver Present yes- wife   Restraints no   Sitter no   Sepsis Risk Score Sepsis Risk Score: 2.73    Situation  Code Status: Prior No additional code details. Allergies: Lisinopril, Zetia [ezetimibe], Atorvastatin, Codeine, Hydrochlorothiazide, Hydroxyzine, Lexapro [escitalopram oxalate], and Pravastatin  Weight: No data found. Arrived from: home  Chief Complaint:   Chief Complaint   Patient presents with    Altered Mental Status     Arrived per EMS from home, altered since last night per wife. Hx of AMS w/ elevated ammonia- some right sided weakness, drowsy during triage     Hospital Problem/Diagnosis:  Principal Problem:    AMS (altered mental status)  Resolved Problems:    * No resolved hospital problems. *    Imaging:   CT CHEST ABDOMEN PELVIS W CONTRAST Additional Contrast? None   Final Result   1. Large right pleural effusion and atelectatic changes in the right upper   and right lower lobe with significantly compromised aeration in the right   lung. 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 Minimal atelectatic   changes left lower lobe. 2. Prominent main pulmonary artery measuring 3.2 cm but no acute pulmonary   embolism. Aorta is nonaneurysmal and there is no evidence of dissection. 3. Moderate ascites in the abdomen and pelvis. Mesenteric congestion. 4. Hepatic cirrhosis with nodularity and some shrinkage but no focal   abnormality. 5. Redemonstration of retroperitoneal lymphadenopathy. 6. Left inguinal hernia containing ascitic fluid. CTA HEAD NECK W CONTRAST   Final Result   No large vessel occlusion visualized within the head or neck. Asymmetric narrowing and attenuation of the left MCA and branch vessels. Recommend digital subtraction angiography for further evaluation.       Incidentally noted pleural effusion and partially visualized right upper lobe   mass. Please refer to the separately dictated CT chest report for further   details and management recommendations. CT HEAD WO CONTRAST   Final Result   No noncontrast CT evidence of acute intracranial pathology. No CT etiology   for patient's clinical complaint of altered mental status noted on this study.       Cortical atrophy, white matter changes consistent with microvascular   degenerative disease, atherosclerotic calcification of the distal internal   carotid and right vertebral arteries of uncertain hemodynamic significance         XR CHEST PORTABLE   Final Result   There appears to be a persistent right pleural fluid and parenchymal   opacification the right lung suggesting atelectasis           Abnormal labs:   Abnormal Labs Reviewed   COMPREHENSIVE METABOLIC PANEL - Abnormal; Notable for the following components:       Result Value    BUN 31 (*)     Creatinine 1.5 (*)     Est, Glom Filt Rate 48 (*)     Glucose 110 (*)     Albumin 2.9 (*)     AST 45 (*)     All other components within normal limits   TROPONIN - Abnormal; Notable for the following components:    Troponin T 0.012 (*)     All other components within normal limits   URINALYSIS WITH REFLEX TO CULTURE - Abnormal; Notable for the following components:    Blood, Urine TRACE (*)     All other components within normal limits   PROTIME/INR & PTT - Abnormal; Notable for the following components:    aPTT 20.8 (*)     All other components within normal limits   BLOOD GAS, VENOUS - Abnormal; Notable for the following components:    pCO2, Jamaal 53 (*)     pO2, Jamaal 67 (*)     Base Exc, Mixed 5.7 (*)     HCO3, Venous 32.1 (*)     O2 Sat, Jamaal 90.7 (*)     All other components within normal limits     Critical values: no     Abnormal Assessment Findings: non-verbal (new onset),does not follow commands, right sided weakness      Background  History:   Past Medical History:   Diagnosis Date    Anxiety  Cirrhosis, alcoholic ([facility] [address]     Diabetes mellitus ([facility] [address]     GERD (gastroesophageal reflux disease) 2000    GERD (gastroesophageal reflux disease)     Hyperlipidemia     Hypertension     Portal hypertension (HCC)     Pulmonary nodules     Sleep apnea     SOB (shortness of breath)     Thyroid disease        Assessment    Vitals/MEWS: MEWS Score: 1  Level of Consciousness: New confusion or agitation (1)   Vitals:    01/04/23 1317 01/04/23 1447 01/04/23 1503 01/04/23 1847   BP: 118/78 (!) 151/82 (!) 147/84 125/67   Pulse: 79 82 82 79   Resp: 16 16 16 13   Temp:  98.1 °F (36.7 °C)  97.6 °F (36.4 °C)   TempSrc:  Oral  Oral   SpO2: 100% 99% 99% 100%     FiO2 (%):   O2 Flow Rate: O2 Device: Nasal cannula O2 Flow Rate (L/min): 2 L/min  Cardiac Rhythm:    Pain Assessment:  [] Verbal [] Sherrie Guild Scale  Pain Scale:    Last documented pain score (0-10 scale)    Last documented pain medication administered:   Mental Status: disoriented  NIH Score:    C-SSRS:    Bedside swallow:    [place]on Coma Scale (GCS): Hali Coma Scale  Eye Opening: Spontaneous  Best Verbal Response: Confused (verbal at times)  Best Motor Response: Localizes pain  Acton Coma Scale Score: 13  Active LDA's:   Peripheral IV 01/04/23 Left Hand (Active)     PO Status: Nothing by Mouth  Pertinent or High Risk Medications/Drips: no   o If Yes, please provide details:   Pending Blood Product Administration: no     You may also review the ED PT Care Timeline found under the Summary Nursing Index tab. Recommendation    Pending orders   Plan for Discharge (if known):    Additional Comments:   If any further questions, please call Sending RN at [phone]    Electronically signed by: Electronically signed by Kaylah King RN on 1/4/2023 at 2051 [facility], RN  01/04/23 97 Merna Morris RN  01/04/23 2023

## 2023-01-04 NOTE — ED PROVIDER NOTES
CARE RECEIVED FROM: Dr Yamileth Quiroz  I reviewed the vuong elements of the history, physical exam and initial treatment plan at the bedside. This is a 68-year-old man who was brought to the emergency room by EMS for acute altered mental status. Family member state that he has been confused and disoriented since last evening. The patient is unable to provide any significant information and responds with tangential information. Patient has a known history of chronic kidney disease along with known pleural effusions. He has recently had a thoracentesis for chronic pleural effusions on the right side. He uses 2 L of oxygen on a regular basis. At the time of signout, the patient is pending evaluation with CT imaging and laboratory evaluation with a plan for admission for altered mental status    ANCILLARY DATA:  I reviewed the images. Radiologist interpretation:   CT HEAD WO CONTRAST   Final Result   No noncontrast CT evidence of acute intracranial pathology. No CT etiology   for patient's clinical complaint of altered mental status noted on this study.       Cortical atrophy, white matter changes consistent with microvascular   degenerative disease, atherosclerotic calcification of the distal internal   carotid and right vertebral arteries of uncertain hemodynamic significance         XR CHEST PORTABLE   Final Result   There appears to be a persistent right pleural fluid and parenchymal   opacification the right lung suggesting atelectasis         CT ABDOMEN PELVIS W IV CONTRAST Additional Contrast? None    (Results Pending)   CTA HEAD NECK W CONTRAST    (Results Pending)     Labs Reviewed   COMPREHENSIVE METABOLIC PANEL - Abnormal; Notable for the following components:       Result Value    BUN 31 (*)     Creatinine 1.5 (*)     Est, Glom Filt Rate 48 (*)     Glucose 110 (*)     Albumin 2.9 (*)     AST 45 (*)     All other components within normal limits   TROPONIN - Abnormal; Notable for the following components: Troponin T 0.012 (*)     All other components within normal limits   PROTIME/INR & PTT - Abnormal; Notable for the following components:    aPTT 20.8 (*)     All other components within normal limits   COVID-19, RAPID   RAPID INFLUENZA A/B ANTIGENS   MAGNESIUM   LIPASE   AMMONIA   LACTIC ACID   TROPONIN   URINALYSIS WITH REFLEX TO CULTURE   BLOOD GAS, VENOUS     MEDICAL DECISION MAKING / PLAN:  A definitive etiology of the patient's altered mental status was not clearly elucidated here in the emergency department. CT of the head does not demonstrate any evidence of intracranial hemorrhage or mass-effect. Laboratory evaluation is negative for influenza and COVID-19. Urinalysis does not demonstrate any evidence of urinary tract infection. The patient has a minimally elevated troponin of 0.012 of unclear significance in the setting of his renal insufficiency. VBG shows normal pH and combined with the patient's serum chemistry shows no evidence of respiratory or metabolic acidosis. Lactic acid is normal and the patient is not tachycardic or febrile and as a result there is no clear indication of acute infectious process. Serum ammonia levels are normal indicating a low likelihood of hepatic encephalopathy. This patient has acute altered mental status of unclear etiology    The patient will be admitted to the hospital for further evaluation by medicine. FINAL IMPRESSION:  1. Altered mental status, unspecified altered mental status type    2. Acute encephalopathy    3. Recurrent right pleural effusion    4. Stage 3 chronic kidney disease, unspecified whether stage 3a or 3b CKD ([facility] [address]      ?   Electronically signed by: Buddy Larios MD, 1/4/2023        Buddy Larios MD  01/04/23 9977

## 2023-01-04 NOTE — ED PROVIDER NOTES
Emergency Department Encounter    Patient: Michael Ortega  MRN: [id number]  : 1945  Date of Evaluation: 2023  ED Provider:  Dylan James DO    Triage Chief Complaint:   Altered Mental Status (Arrived per EMS from home, altered since last night per wife. Hx of AMS w/ elevated ammonia- some right sided weakness, drowsy during triage)    [place]:  Michael Ortega is a 78 y.o. male that presents to the emergency department for altered mental status brought in by EMS. Per EMS report patient with altered mental status since last night per wife. They state wife noted them that he acts like this when he has elevated ammonia. Patient wife did present to bedside states patient started not acting with his last evening. She states tired weak fatigue. She states he is is on lactulose for liver failure but he had a bowel movement all over the toilet. She states she woke up this morning and urinated all over the other bathroom down the dover. She states she has been weak fatigue tiredness is not his normal self. She states he does get paracentesis every 2 weeks and his last one was last Wednesday. She states he also has had thoracentesis for chronic pleural effusions on the right lung. She states she did have to put his oxygen back on last evening because it fell off. She states he is on 2 L oxygen nasal cannula around-the-clock. She states high ammonia level usually makes him act like this so she called 9 1 to have him brought in for evaluation.     ROS - see HPI, below listed is current ROS at time of my eval:  General:  No fevers, no chills, no weakness  Eyes:  No recent vison changes, no discharge  ENT:  No sore throat, no nasal congestion, no hearing changes  Cardiovascular:  No chest pain, no palpitations  Respiratory:  No shortness of breath, no cough, no wheezing  Gastrointestinal:  No pain, no nausea, no vomiting, no diarrhea  Musculoskeletal:  No muscle pain, no joint pain  Skin:  No rash, no pruritis, no easy bruising  Neurologic: Positive for altered mental status no speech problems, no headache, no extremity numbness, no extremity tingling, no extremity weakness  Psychiatric:  No anxiety  Genitourinary:  No dysuria, no hematuria  Endocrine:  No unexpected weight gain, no unexpected weight loss  Extremities:  no edema, no pain    Past Medical History:   Diagnosis Date    Anxiety     Cirrhosis, alcoholic ([facility] [address]     Diabetes mellitus ([facility] [address]     GERD (gastroesophageal reflux disease)     GERD (gastroesophageal reflux disease)     Hyperlipidemia     Hypertension     Portal hypertension (HCC)     Pulmonary nodules     Sleep apnea     SOB (shortness of breath)     Thyroid disease      Past Surgical History:   Procedure Laterality Date    APPENDECTOMY      CHOLECYSTECTOMY      COLONOSCOPY      ENDOSCOPY, COLON, DIAGNOSTIC      FOOT SURGERY      needle removed,not sure which foot, per wife.     TONSILLECTOMY      UPPER GASTROINTESTINAL ENDOSCOPY N/A 2022    EGD BIOPSY performed by Gallo Yates MD at [address] [place] [address]       Family History   Problem Relation Age of Onset    Hypertension Brother     Diabetes Other      Social History     Socioeconomic History    Marital status:      Spouse name: Not on file    Number of children: Not on file    Years of education: Not on file    Highest education level: Not on file   Occupational History     Comment: Retired    Tobacco Use    Smoking status: Former     Packs/day: 1.00     Years: 14.00     Pack years: 14.00     Types: Cigarettes     Quit date:      Years since quittin.0    Smokeless tobacco: Never   Vaping Use    Vaping Use: Never used   Substance and Sexual Activity    Alcohol use: Not Currently    Drug use: Never    Sexual activity: Yes     Partners: Female   Other Topics Concern    Not on file   Social History Narrative    Not on file     Social Determinants of Health     Financial Resource Strain: Low Risk Difficulty of Paying Living Expenses: Not hard at all   Food Insecurity: No Food Insecurity    Worried About Running Out of Food in the Last Year: Never true    Ran Out of Food in the Last Year: Never true   Transportation Needs: Not on file   Physical Activity: Not on file   Stress: Not on file   Social Connections: Not on file   Intimate Partner Violence: Not on file   Housing Stability: Not on file     No current facility-administered medications for this encounter. Current Outpatient Medications   Medication Sig Dispense Refill    furosemide (LASIX) 40 MG tablet Take 1 tablet by mouth daily 60 tablet 3    levothyroxine (SYNTHROID) 50 MCG tablet Take 1 tablet by mouth Daily 90 tablet 1    albuterol sulfate HFA (PROVENTIL;VENTOLIN;PROAIR) 108 (90 Base) MCG/ACT inhaler INHALE 2 PUFFS BY MOUTH 4 TIMES DAILY AS NEEDED FOR WHEEZING 1 each 2    DULoxetine (CYMBALTA) 60 MG extended release capsule TAKE 1 CAPSULE BY MOUTH ONCE DAILY 90 capsule 1    betamethasone valerate (VALISONE) 0.1 % lotion Apply topically 2 times daily prn scalp rash 60 mL 3    spironolactone (ALDACTONE) 100 MG tablet Take 100 mg by mouth 2 times daily      nadolol (CORGARD) 40 MG tablet Take 1 tablet by mouth in the morning.  (Patient taking differently: Take 40 mg by mouth nightly) 90 tablet 3    lactulose (CHRONULAC) 10 GM/15ML solution Take 30 mLs by mouth 3 times daily Hold if having diarrhea 2700 mL 5    Multiple Vitamin (MULTI VITAMIN MENS PO) Take 1 tablet by mouth daily      ESOMEPRAZOLE MAGNESIUM PO Take 20 mg by mouth daily OTC      Omega-3 1000 MG CAPS Take 2,000 mg by mouth daily       Allergies   Allergen Reactions    Lisinopril Swelling     Tongue swelling      Zetia [Ezetimibe] Swelling     Facial swelling    Atorvastatin     Codeine Other (See Comments)     Blood pressure drops    Hydrochlorothiazide     Hydroxyzine      Fatigue and depressed    Lexapro [Escitalopram Oxalate]      Increased depression    Pravastatin       Nursing Notes Reviewed    Physical Exam:  Triage VS:    ED Triage Vitals [01/04/23 1150]   Enc Vitals Group      BP (!) 162/86      Heart Rate 68      Resp 16      Temp 98 °F (36.7 °C)      Temp Source Axillary      SpO2 100 %      Weight       Height       Head Circumference       Peak Flow       Pain Score       Pain Loc       Pain Edu?       Excl. in GC?        My pulse ox interpretation is - normal    General appearance:  No acute distress.   Head: Normocephalic, atraumatic  Skin:  Warm. Dry.   Eye:  Extraocular movements intact.  Pupils equal round reactive to light.  Patient squeezes eyes shut during exam.  Ears, nose, mouth and throat:  Oral mucosa dry, patient holds his lips and mouth closed/clenched when I try to open it.  Neck: No midline vertebral spine crepitus step-off or deformity, trachea midline.   Extremity: +1 edema bilateral lower extremities.  Normal ROM     Heart:  Regular rate and rhythm, normal S1 & S2, no extra heart sounds.    Perfusion:  intact  Respiratory:  Lungs clear to auscultation bilaterally.  Respirations nonlabored.     Abdominal: Obese abdomen normal bowel sounds.  Soft.  Nontender to palpation.  Non distended.  Back:  No CVA tenderness to palpation     Neurological: Altered mental status, sleepy and drowsy upon arrival,  can follow some commands.  Unable to perform full neurologic exam due to patient altered mental status.              I have reviewed and interpreted all of the currently available lab results from this visit (if applicable):  Results for orders placed or performed during the hospital encounter of 01/04/23   Comprehensive Metabolic Panel   Result Value Ref Range    Sodium 136 135 - 145 MMOL/L    Potassium 4.6 3.5 - 5.1 MMOL/L    Chloride 101 99 - 110 mMol/L    CO2 24 21 - 32 MMOL/L    BUN 31 (H) 6 - 23 MG/DL    Creatinine 1.5 (H) 0.9 - 1.3 MG/DL    Est, Glom Filt Rate 48 (L) >60 mL/min/1.73m2    Glucose 110 (H) 70 - 99 MG/DL    Calcium 9.5 8.3 - 10.6 MG/DL  Albumin 2.9 (L) 3.4 - 5.0 GM/DL    Total Protein 7.7 6.4 - 8.2 GM/DL    Total Bilirubin 0.9 0.0 - 1.0 MG/DL    ALT 29 10 - 40 U/L    AST 45 (H) 15 - 37 IU/L    Alkaline Phosphatase 89 40 - 129 IU/L    Anion Gap 11 4 - 16   Troponin   Result Value Ref Range    Troponin T 0.012 (H) <0.01 NG/ML   Magnesium   Result Value Ref Range    Magnesium 2.1 1.8 - 2.4 mg/dl   Lipase   Result Value Ref Range    Lipase 35 13 - 60 IU/L   Ammonia   Result Value Ref Range    Ammonia 54 16 - 60 UMOL/L   Lactic Acid   Result Value Ref Range    Lactate 1.1 0.5 - 1.9 mMOL/L   Protime/INR & PTT   Result Value Ref Range    Protime 12.4 11.7 - 14.5 SECONDS    INR 0.96 INDEX    aPTT 20.8 (L) 25.1 - 37.1 SECONDS   EKG 12 Lead   Result Value Ref Range    Ventricular Rate 69 BPM    Atrial Rate 69 BPM    P-R Interval 156 ms    QRS Duration 86 ms    Q-T Interval 430 ms    QTc Calculation (Bazett) 460 ms    P Axis 20 degrees    R Axis 62 degrees    T Axis 25 degrees    Diagnosis       Normal sinus rhythm  Low voltage QRS  Borderline ECG  When compared with ECG of 12-NOV-2022 13:22,  No significant change was found        Radiographs (if obtained):  Radiologist's Report Reviewed:  CT HEAD WO CONTRAST    Result Date: 1/4/2023  EXAMINATION: CT OF THE HEAD WITHOUT CONTRAST  1/4/2023 12:44 pm TECHNIQUE: CT of the head was performed without the administration of intravenous contrast. Automated exposure control, iterative reconstruction, and/or weight based adjustment of the mA/kV was utilized to reduce the radiation dose to as low as reasonably achievable. COMPARISON: 11/12/2022, multiple prior head CTs dating back to 02/11/2022 HISTORY: ORDERING SYSTEM PROVIDED HISTORY: ams TECHNOLOGIST PROVIDED HISTORY: Reason for exam:->ams Has a \"code stroke\" or \"stroke alert\" been called? ->No Decision Support Exception - unselect if not a suspected or confirmed emergency medical condition->Emergency Medical Condition (MA) Reason for Exam: ams FINDINGS: BRAIN/VENTRICLES: There is no acute intracranial hemorrhage, mass effect or midline shift. No abnormal extra-axial fluid collection. The gray-white differentiation is maintained without evidence of an acute infarct. There is no evidence of hydrocephalus. Mild cortical atrophy, white matter changes consistent with microvascular degenerative disease, atherosclerotic calcification in the distal internal carotid arteries bilaterally and distal right vertebral artery uncertain hemodynamic significance. ORBITS: The visualized portion of the orbits demonstrate no acute abnormality. SINUSES: The visualized paranasal sinuses and mastoid air cells demonstrate no acute abnormality. SOFT TISSUES/SKULL:  No acute abnormality of the visualized skull or soft tissues. No noncontrast CT evidence of acute intracranial pathology. No CT etiology for patient's clinical complaint of altered mental status noted on this study. Cortical atrophy, white matter changes consistent with microvascular degenerative disease, atherosclerotic calcification of the distal internal carotid and right vertebral arteries of uncertain hemodynamic significance     XR CHEST PORTABLE    Result Date: 1/4/2023  EXAMINATION: ONE XRAY VIEW OF THE CHEST 1/4/2023 12:42 pm COMPARISON: 12/09/2022 HISTORY: ORDERING SYSTEM PROVIDED HISTORY: Chest Pain TECHNOLOGIST PROVIDED HISTORY: Reason for exam:->Chest Pain Reason for Exam: chest pain FINDINGS: Heart size stable. Left lung clear. Similar appearing pleural and parenchymal opacities throughout the right hemithorax. No pneumothorax.      There appears to be a persistent right pleural fluid and parenchymal opacification the right lung suggesting atelectasis       EKG (if obtained): (All EKG's are interpreted by myself in the absence of a cardiologist)    Medications - No data to display    MDM:  Patient is a 77-year-old male with history of pleural effusions, respiratory failure, kidney failure, diabetes, hypertension, hyperlipidemia, alcohol cirrhosis with paracentesis, portal hypertension, thyroid disease and hepatic encephalopathy brought in via EMS for altered mental status sent in by wife. Wife did finally present to the bedside. She states he acts like this when he has elevated ammonia level. He states he has been taking his medication. She states she is unsure if he took some extra doses she is not sure why he is so sleepy and tired. She states he not acting right. Patient placed on cardiac monitor and 2 L oxygen nasal cannula which he is on at home. EKG was performed which showed normal sinus rhythm interpreted by me, ventricular rate of 69, MO interval 156, cures duration 86, QT/QTc 430/460. Differential diagnosis including hepatic encephalopathy, sepsis, hyperammonemia, urinary tract infection, brain bleed, CVA. Laboratory studies showed normal sodium and potassium. Patient with chronic kidney disease creatinine 1.5, BUN of 31. Patient glucose 110. Patient normal calcium, normal liver enzymes except for slightly elevated AST of 45. Patient with elevated troponin 0.012 and has been chronically elevated in the past.  Patient with normal magnesium, normal ammonia level 54, normal lactic acid. CT scan of the head no acute intracranial pathology cortical atrophy white matter changes consistent with microvascular degenerative disease atherosclerotic calcification of the distal internal carotid and right vertebral arteries of uncertain hemodynamic significance. Chest x-ray persistent right pleural effusion parenchymal opacification of the right lung suggesting atelectasis. I am unsure of patient's cause of his altered mental status. I have added on a COVID and influenza test, venous blood gas to look at his CO2 and pH levels, CT of the head and neck to rule out any arterial occlusions. Urinalysis has not been collected but has been ordered.   I have reached end of my shift patient was handed off to colleague Dr. Anival Cintron pending laboratory imaging urinalysis and further disposition to hospitalist.    Clinical Impression:  1. Altered mental status, unspecified altered mental status type    2. Acute encephalopathy    3. Recurrent right pleural effusion    4. Stage 3 chronic kidney disease, unspecified whether stage 3a or 3b CKD [facility])          ED Provider Disposition Time  DISPOSITION Decision To Admit 01/04/2023 02:17:28 PM      Comment: Please note this report has been produced using speech recognition software and may contain errors related to that system including errors in grammar, punctuation, and spelling, as well as words and phrases that may be inappropriate. Efforts were made to edit the dictations.         Neela Nash DO  01/06/23 0434

## 2023-01-04 NOTE — ED NOTES
Wife at bedside states that patient was at baseline last night, last normal around 2000. States he was playing with grandchildren last night. States that some time during the night, pt got up and had been incontinent of stool and was not acting normal- continued to decline. States that he acts tired when his ammonia is high.        Leonardo Rios RN  01/04/23 [id number]

## 2023-01-04 NOTE — ED TRIAGE NOTES
MD Renaldo Soria notified of last normal time and altered mental status on arrival.  Per EMS, some right sided weakness noted at some time during transport. Pt is unable to follow commands. Remains drowsy- responds w/ eye contact to verbal stimuli but has no verbal responses to questions. Attempted to contact wife to obtain more history- unable to reach at this time.

## 2023-01-05 ENCOUNTER — APPOINTMENT (OUTPATIENT)
Dept: INTERVENTIONAL RADIOLOGY/VASCULAR | Age: 78
DRG: 442 | End: 2023-01-05
Payer: MEDICARE

## 2023-01-05 LAB
ALBUMIN FLUID: 0.4 GM/DL
ALBUMIN SERPL-MCNC: 3 GM/DL (ref 3.4–5)
ALP BLD-CCNC: 88 IU/L (ref 40–129)
ALT SERPL-CCNC: 27 U/L (ref 10–40)
AMPHETAMINES: NEGATIVE
ANION GAP SERPL CALCULATED.3IONS-SCNC: 8 MMOL/L (ref 4–16)
AST SERPL-CCNC: 39 IU/L (ref 15–37)
BARBITURATE SCREEN URINE: NEGATIVE
BENZODIAZEPINE SCREEN, URINE: NEGATIVE
BILIRUB SERPL-MCNC: 1.3 MG/DL (ref 0–1)
BUN BLDV-MCNC: 28 MG/DL (ref 6–23)
CALCIUM SERPL-MCNC: 9.6 MG/DL (ref 8.3–10.6)
CANNABINOID SCREEN URINE: NEGATIVE
CHLORIDE BLD-SCNC: 103 MMOL/L (ref 99–110)
CHOLESTEROL: 179 MG/DL
CO2: 30 MMOL/L (ref 21–32)
COCAINE METABOLITE: NEGATIVE
CREAT SERPL-MCNC: 1.2 MG/DL (ref 0.9–1.3)
EKG ATRIAL RATE: 74 BPM
EKG DIAGNOSIS: NORMAL
EKG P AXIS: 66 DEGREES
EKG P-R INTERVAL: 152 MS
EKG Q-T INTERVAL: 390 MS
EKG QRS DURATION: 84 MS
EKG QTC CALCULATION (BAZETT): 432 MS
EKG R AXIS: 69 DEGREES
EKG T AXIS: 19 DEGREES
EKG VENTRICULAR RATE: 74 BPM
FLUID TYPE: NORMAL INDEX
GFR SERPL CREATININE-BSD FRML MDRD: >60 ML/MIN/1.73M2
GLUCOSE BLD-MCNC: 99 MG/DL (ref 70–99)
HDLC SERPL-MCNC: 51 MG/DL
LDL CHOLESTEROL CALCULATED: 113 MG/DL
LYMPHOCYTES, BODY FLUID: 47 %
MESOTHELIAL FLUID: 5 /100 WBC
MONOCYTE, FLUID: 47 %
NEUTROPHIL, FLUID: 6 %
OPIATES, URINE: NEGATIVE
OXYCODONE: NEGATIVE
PHENCYCLIDINE, URINE: NEGATIVE
POTASSIUM SERPL-SCNC: 4 MMOL/L (ref 3.5–5.1)
PROTEIN FLUID: 1 GM/DL
RBC FLUID: 34 /CU MM
SODIUM BLD-SCNC: 141 MMOL/L (ref 135–145)
SOURCE FLUID: NORMAL
TOTAL PROTEIN: 6.7 GM/DL (ref 6.4–8.2)
TRIGL SERPL-MCNC: 73 MG/DL
VITAMIN D 25-HYDROXY: 19.2 NG/ML
WBC FLUID: 238 /CU MM

## 2023-01-05 PROCEDURE — 93005 ELECTROCARDIOGRAM TRACING: CPT | Performed by: NURSE PRACTITIONER

## 2023-01-05 PROCEDURE — 87070 CULTURE OTHR SPECIMN AEROBIC: CPT

## 2023-01-05 PROCEDURE — 1200000000 HC SEMI PRIVATE

## 2023-01-05 PROCEDURE — 89051 BODY FLUID CELL COUNT: CPT

## 2023-01-05 PROCEDURE — 93010 ELECTROCARDIOGRAM REPORT: CPT | Performed by: INTERNAL MEDICINE

## 2023-01-05 PROCEDURE — 2709999900 IR US GUIDED PARACENTESIS

## 2023-01-05 PROCEDURE — 6360000002 HC RX W HCPCS

## 2023-01-05 PROCEDURE — 49083 ABD PARACENTESIS W/IMAGING: CPT

## 2023-01-05 PROCEDURE — 82105 ALPHA-FETOPROTEIN SERUM: CPT

## 2023-01-05 PROCEDURE — 2580000003 HC RX 258

## 2023-01-05 PROCEDURE — 94640 AIRWAY INHALATION TREATMENT: CPT

## 2023-01-05 PROCEDURE — 84157 ASSAY OF PROTEIN OTHER: CPT

## 2023-01-05 PROCEDURE — 97530 THERAPEUTIC ACTIVITIES: CPT

## 2023-01-05 PROCEDURE — 6360000002 HC RX W HCPCS: Performed by: NURSE PRACTITIONER

## 2023-01-05 PROCEDURE — 6370000000 HC RX 637 (ALT 250 FOR IP): Performed by: NURSE PRACTITIONER

## 2023-01-05 PROCEDURE — 92610 EVALUATE SWALLOWING FUNCTION: CPT

## 2023-01-05 PROCEDURE — 6360000002 HC RX W HCPCS: Performed by: SPECIALIST

## 2023-01-05 PROCEDURE — P9047 ALBUMIN (HUMAN), 25%, 50ML: HCPCS

## 2023-01-05 PROCEDURE — 0W9G3ZZ DRAINAGE OF PERITONEAL CAVITY, PERCUTANEOUS APPROACH: ICD-10-PCS | Performed by: SPECIALIST

## 2023-01-05 PROCEDURE — 36415 COLL VENOUS BLD VENIPUNCTURE: CPT

## 2023-01-05 PROCEDURE — 87205 SMEAR GRAM STAIN: CPT

## 2023-01-05 PROCEDURE — P9047 ALBUMIN (HUMAN), 25%, 50ML: HCPCS | Performed by: SPECIALIST

## 2023-01-05 PROCEDURE — 80061 LIPID PANEL: CPT

## 2023-01-05 PROCEDURE — 6370000000 HC RX 637 (ALT 250 FOR IP): Performed by: SPECIALIST

## 2023-01-05 PROCEDURE — 99222 1ST HOSP IP/OBS MODERATE 55: CPT | Performed by: SPECIALIST

## 2023-01-05 PROCEDURE — 94761 N-INVAS EAR/PLS OXIMETRY MLT: CPT

## 2023-01-05 PROCEDURE — 2580000003 HC RX 258: Performed by: NURSE PRACTITIONER

## 2023-01-05 PROCEDURE — 82042 OTHER SOURCE ALBUMIN QUAN EA: CPT

## 2023-01-05 PROCEDURE — 97165 OT EVAL LOW COMPLEX 30 MIN: CPT

## 2023-01-05 PROCEDURE — 2700000000 HC OXYGEN THERAPY PER DAY

## 2023-01-05 RX ORDER — LANOLIN ALCOHOL/MO/W.PET/CERES
100 CREAM (GRAM) TOPICAL DAILY
Status: DISCONTINUED | OUTPATIENT
Start: 2023-01-11 | End: 2023-01-11 | Stop reason: HOSPADM

## 2023-01-05 RX ORDER — ALBUMIN (HUMAN) 12.5 G/50ML
25 SOLUTION INTRAVENOUS ONCE
Status: COMPLETED | OUTPATIENT
Start: 2023-01-05 | End: 2023-01-05

## 2023-01-05 RX ORDER — PANTOPRAZOLE SODIUM 20 MG/1
20 TABLET, DELAYED RELEASE ORAL
Status: DISCONTINUED | OUTPATIENT
Start: 2023-01-05 | End: 2023-01-11 | Stop reason: HOSPADM

## 2023-01-05 RX ORDER — ALBUTEROL SULFATE 90 UG/1
2 AEROSOL, METERED RESPIRATORY (INHALATION) EVERY 4 HOURS PRN
Status: DISCONTINUED | OUTPATIENT
Start: 2023-01-05 | End: 2023-01-06

## 2023-01-05 RX ADMIN — PANTOPRAZOLE SODIUM 20 MG: 20 TABLET, DELAYED RELEASE ORAL at 05:07

## 2023-01-05 RX ADMIN — RIFAXIMIN 550 MG: 550 TABLET ORAL at 21:24

## 2023-01-05 RX ADMIN — OMEGA-3-ACID ETHYL ESTERS 2000 MG: 1 CAPSULE, LIQUID FILLED ORAL at 09:51

## 2023-01-05 RX ADMIN — FUROSEMIDE 40 MG: 40 TABLET ORAL at 09:51

## 2023-01-05 RX ADMIN — RIFAXIMIN 550 MG: 550 TABLET ORAL at 09:51

## 2023-01-05 RX ADMIN — SPIRONOLACTONE 100 MG: 50 TABLET ORAL at 09:51

## 2023-01-05 RX ADMIN — NADOLOL 40 MG: 20 TABLET ORAL at 09:56

## 2023-01-05 RX ADMIN — ALBUTEROL SULFATE 2 PUFF: 90 AEROSOL, METERED RESPIRATORY (INHALATION) at 07:31

## 2023-01-05 RX ADMIN — ALBUMIN (HUMAN) 25 G: 0.25 INJECTION, SOLUTION INTRAVENOUS at 08:30

## 2023-01-05 RX ADMIN — SODIUM CHLORIDE, PRESERVATIVE FREE 10 ML: 5 INJECTION INTRAVENOUS at 09:51

## 2023-01-05 RX ADMIN — SPIRONOLACTONE 100 MG: 50 TABLET ORAL at 21:24

## 2023-01-05 RX ADMIN — LEVOTHYROXINE SODIUM 50 MCG: 0.05 TABLET ORAL at 05:08

## 2023-01-05 RX ADMIN — LACTULOSE 20 G: 20 SOLUTION ORAL at 14:15

## 2023-01-05 RX ADMIN — LACTULOSE 20 G: 20 SOLUTION ORAL at 21:24

## 2023-01-05 RX ADMIN — SODIUM CHLORIDE, PRESERVATIVE FREE 10 ML: 5 INJECTION INTRAVENOUS at 21:24

## 2023-01-05 RX ADMIN — DULOXETINE HYDROCHLORIDE 60 MG: 30 CAPSULE, DELAYED RELEASE ORAL at 09:51

## 2023-01-05 RX ADMIN — THIAMINE HYDROCHLORIDE 500 MG: 100 INJECTION, SOLUTION INTRAMUSCULAR; INTRAVENOUS at 14:12

## 2023-01-05 RX ADMIN — THIAMINE HYDROCHLORIDE 500 MG: 100 INJECTION, SOLUTION INTRAMUSCULAR; INTRAVENOUS at 05:07

## 2023-01-05 RX ADMIN — LACTULOSE 20 G: 20 SOLUTION ORAL at 09:51

## 2023-01-05 RX ADMIN — THIAMINE HYDROCHLORIDE 500 MG: 100 INJECTION, SOLUTION INTRAMUSCULAR; INTRAVENOUS at 21:28

## 2023-01-05 ASSESSMENT — PAIN SCALES - GENERAL
PAINLEVEL_OUTOF10: 0
PAINLEVEL_OUTOF10: 0

## 2023-01-05 NOTE — PROGRESS NOTES
Occupational Therapy    [facility] ACUTE CARE OCCUPATIONAL THERAPY EVALUATION  Saran [place], 1945, [id number], 1/5/2023    History  Elim IRA:  The primary encounter diagnosis was Altered mental status, unspecified altered mental status type. Diagnoses of Acute encephalopathy, Recurrent right pleural effusion, and Stage 3 chronic kidney disease, unspecified whether stage 3a or 3b CKD ([address] were also pertinent to this visit. Patient  has a past medical history of Anxiety, Cirrhosis, alcoholic ([address] Diabetes mellitus ([address] GERD (gastroesophageal reflux disease), GERD (gastroesophageal reflux disease), Hyperlipidemia, Hypertension, Portal hypertension ([address] Pulmonary nodules, Sleep apnea, SOB (shortness of breath), and Thyroid disease. Patient  has a past surgical history that includes Cholecystectomy; Vasectomy; Colonoscopy; Endoscopy, colon, diagnostic; Foot surgery; Upper gastrointestinal endoscopy (N/A, 1/19/2022); Tonsillectomy; and Appendectomy. Subjective:  Patient states:  \"I'm feeling a lot better\".     Pain:  No.    Communication with other providers:  Handoff to RN  Restrictions: General Precautions, Fall Risk    Home Setup/Prior level of function   Social/Functional History  Lives With: Spouse  Type of Home: House  Home Layout: One level,Performs ADL's on one level  Home Access: Stairs to enter with rails  Entrance Stairs - Number of Steps: 5  Entrance Stairs - Rails: Both  Bathroom Shower/Tub: Walk-in shower  Bathroom Toilet: Standard  Bathroom Accessibility: Accessible  Has the patient had two or more falls in the past year or any fall with injury in the past year?: Yes (2, tripped on dog, injured both (non hospital))  Receives Help From: Family (2 granddaughters)  ADL Assistance: Independent  Homemaking Assistance: Independent  Homemaking Responsibilities: Yes  Ambulation Assistance: Independent  Transfer Assistance: Independent  Active : Yes       Pt's family verifies above information    Examination of body systems (includes body structures/functions, activity/participation limitations):  Observation:  Sitting upright in chair, agreeable to therapy   Vision:  WFL  Hearing:  [place]  Cardiopulmonary:  No 02 needs      Body Systems and functions:  ROM R/L:  WFL. Strength R/L:  5/5,   Sensation: WFL  Tone: Normal  Coordination: WFL  Perception: WNL    Activities of Daily Living (ADLs):  Feeding: Independent  Grooming: Supervision (washing hands/face w/ warm washcloth)  UB bathing: Independent  LB bathing: SBA  UB dressing: Independent  LB dressing: SBA  Toileting: SBA    Cognitive and Psychosocial Functioning:  Overall cognitive status: AxO 3 not to time, decreased problem solving/safety awareness, decreased awareness into insights, min re-direction to task, family reports this is greatly increased cognition from initial hospitalization day  Affect: Pleasantly confused       Mobility:  Supine to sit:  DNT pt received sitting upright in chair  Transfers: SBA from reclining chair  Sitting balance:  Independent. Standing balance:  SBA . Functional Mobility SBA to/from room ~150 feet  Toilet/Shower Transfers: DNT             [facility] Daily Activity Inpatient   How much help for putting on and taking off regular lower body clothing?: A Little  How much help for Bathing?: A Little  How much help for Toileting?: A Little  How much help for putting on and taking off regular upper body clothing?: None  How much help for taking care of personal grooming?: None  How much help for eating meals?: None  [facility] Inpatient Daily Activity Raw Score: 21  AM-[facility] Inpatient ADL T-Scale Score : 44.27  ADL Inpatient CMS 0-100% Score: 32.79  ADL Inpatient CMS G-Code Modifier : CJ    Treatment:  Therapeutic Activity Training:   Therapeutic activity training was instructed today. Cues were given for safety, sequence, UE/LE placement, awareness, and balance.     Activities performed today included STS, functional mobility, stand to sit    Safety: patient left in chair with chair alarm, call light within reach, RN notified, gait belt used. Assessment:  Pt is a 77 yo male admitted from home for AMS. Pt at baseline is Independent for ADLs Independent for high level IADLs and Independent for functional transfers/mobility. Pt currently presents w/ min deficits in ADL and high level IADL independence, functional activity tolerance, dynamic sitting and standing balance and tolerance and functional transfers and cognition Pt would benefit from continued acute care OT services w/ discharge to home w/ home health OT S1 w/ assist PRN. Pt's family is not too interested in home health. Complexity: Low  Prognosis: Good, no significant barriers to participation at this time. Occupational Therapy Plan  Times Per Week: 1x+  Times Per Day:  Once a day  Current Treatment Recommendations: Strengthening, ROM, Balance training, Functional mobility training, Endurance training, Patient/Caregiver education & training, Self-Care / ADL, Safety education & training, Pain management, Equipment evaluation, education, & procurement, Positioning, Home management training, Cognitive reorientation, Cognitive/Perceptual training     Equipment: defer    Goals:  Pt goal: go home  Time Frame for STGs: discharge  Goal 1: Pt will perform UE ADLs Independent  Goal 2: Pt will perform LE ADLs Independent  Goal 3: Pt will perform toileting Independent  Goal 4: Pt will perform functional transfer Independent  Goal 5: Pt will perform functional mobility Independent  Goal 6: Pt will perform therex/theract in order to increase functional activity tolerance and dynamic standing balance    Treatment plan:  Pt will perform functional task in stand reaching in all 3 planes in order to increase dynamic standing balance and functional activity tolerance    Recommendations for NURSING activity: Up to chair for all 3 meals and up to standard commode for all toileting needs    Time:   Time in: 1127  Time out: 1143  Timed treatment minutes: 8 minutes  Total time: 16 minutes    Electronically signed by:    Melissa MARRERO/L [id number]  11:51 AM,1/5/2023

## 2023-01-05 NOTE — ED NOTES
This RN imani rainbow/1st set of cultures. Sent to lab at this time.      Roberto Warren RN  01/04/23 3215

## 2023-01-05 NOTE — PROCEDURES
PROCEDURE PERFORMED: Paracentesis    INFORMED CONSENT:  Obtained prior to procedure. Consent placed in chart. ARRIVED TO ROOM: 0755    BARRIER PRECAUTIONS & STERILE TECHNIQUE:               Pt transferred to the table and positioned for comfort. Warm blankets given. Pt placed on Cardiac/Vital Monitor. Pt prepped and draped in a sterile fashion with chlorhexadine.     TIME OUT:  0803    PROCEDURE STARTED: 0804    PAIN/LOCAL ANESTHESIA/SEDATION MANAGEMENT:           Local: Lidocaine 1% given by            INTRAOPERATIVE:           ACCESS TIME: 0804          US/FLUORO: 3 US Images          CATHETER USED: Safe t centesis          Albumin given IV: 25g   STERILE DRESSINGS: Guaze and tegaderm    SPECIMENS: 1020cc  chyle ascitic fluid sent to lab, 4620 cc total    EBL:     <1    PROCEDURE ENDED: 1769    LEFT THE ROOM: 0865    REPORT CALLED TO: Jenn Bloom RN

## 2023-01-05 NOTE — H&P
V2.0  History and Physical      Name:  Eli Shukla /Age/Sex: 1945  (78 y.o. male)   MRN & CSN:  [id number] & [id number] Encounter Date/Time: 2023 7:07 PM EST   Location:  [address] [place] [address] PCP: Mena Bazan, [address] [place] [address] Day: 1    Assessment and Plan:   Eli Shukla is a 78 y.o. male with a pmh of alcoholic liver cirrhosis who presents with AMS (altered mental status)    Hospital Problems             Last Modified POA    * (Principal) AMS (altered mental status) 2023 Yes       Altered mental status  Hepatic encephalopathy   Admit to inpatient, MedSurg   Likely hepatic encephalopathy, possibly Warnicke's encephalopathy   Will initiate empiric treatment for Warnicke's encephalopathy as benefits greatly outweigh risks. Initiate thiamine IV    Check vitamin B12, multiple urine drug screen, UA, CBC   Ammonia level 54, within baseline. Continue lactulose 30 g 3 times daily   Continue home Lasix, spironolactone   Neurochecks every 4 hours   UA negative for leukocytes and nitrates. Rapid COVID and influenza negative   Check blood cultures x2, CBC pending, to rule out underlying infection as source of altered mental status   Head CT negative for acute intracranial process. CT abdomen pelvis with stable large right pleural effusion and abdominal ascites, see below   Fall precautions, hold all sedating meds   Hold off on further imaging of brain including MRI, reassess in a.m.    Recommend neurology consult and brain MRI  if failure to improve in neuro status     Chronically elevated troponin and absence of chest pain   Troponin 0.012, trend every 6 hours x 2   Obtain EKG   Telemetry monitoring x48 hours   Recommend cardiology consult if trending upward or complaints of chest pain    Recurrent right-sided pleural effusion  Abdominal ascites   S/p post multiple prior paracentesis, last 2028   Continue home diuretic regimen Lasix/spironolactone   Consult to GI, Dr. Rene Shin, appreciate assistance   History of prior thoracentesis chronic pleural effusion right lung. Check NT proBNP, pending   Laying flat, on baseline 2 L oxygen nasal cannula, no dyspnea on exam      Alcoholic liver cirrhosis  Portal hypertension  History of esophageal varices   Follows with Dr. Ludwig Tinajero   Patient deemed not a candidate for TIPS or transplant on prior admissions    CKD stage III   Baseline creatinine 1.3, creatinine remains near baseline, continue to monitor       History of essential hypertension   Patient no longer on BP meds, continue to monitor    Hypothyroidism   Continue home Synthroid    History of alcohol abuse   History of prior alcohol abuse, patient no longer drinking, continued abstinence encouraged  Disposition:   Current Living situation: Home with wife  Expected Disposition: Likely same  Estimated D/C: 3 to 4 days    Diet Diet NPO   DVT Prophylaxis [x] Lovenox, []  Heparin, [] SCDs, [] Ambulation,  [] Eliquis, [] Xarelto, [] Coumadin   Code Status Full Code   Surrogate Decision Maker/ POA      History from:     spouse, reason patient could not give history:  altered mental status    History of Present Illness:     Chief Complaint: Altered mental status  Alana Toribio is a 78 y.o. male with pmh of chronic kidney disease, cirrhosis of liver with ascites, esophageal varices who presented to the ED via EMS with complaints of altered mental status. Context this patient has been confused and disoriented since last evening. Patient is unable to provide any significant information in response with tangential information. Patient has a history of cirrhosis with ascites. He follows with Dr. Ludwig Tinajero. He also has known chronic pleural effusions and wears home oxygen at 2 L nasal cannula at baseline. Patient's wife stated patient started complaining of being more tired, weak and fatigued last evening.   Patient's wife noted he is on lactulose for liver failure but states he had a bowel movement all over the toilet yesterday evening. She states he was not acting like himself yesterday evening. He does get a paracentesis every 2 weeks and his last one was last Wednesday. He also has had a thoracentesis for chronic pleural effusions of the right lung. Review of Systems: Need 10 Elements   Review of Systems   Unable to perform ROS: Mental status change          Objective:   No intake or output data in the 24 hours ending 01/04/23 1939   Vitals:   Vitals:    01/04/23 1447 01/04/23 1503 01/04/23 1847 01/04/23 1919   BP: (!) 151/82 (!) 147/84 125/67 137/72   Pulse: 82 82 79 77   Resp: 16 16 13 17   Temp: 98.1 °F (36.7 °C)  97.6 °F (36.4 °C)    TempSrc: Oral  Oral    SpO2: 99% 99% 100% 100%       Medications Prior to Admission     Prior to Admission medications    Medication Sig Start Date End Date Taking? Authorizing Provider   furosemide (LASIX) 40 MG tablet Take 1 tablet by mouth daily 11/22/22   Kaitlynn Alvarez MD   levothyroxine (SYNTHROID) 50 MCG tablet Take 1 tablet by mouth Daily 10/14/22 4/12/23  Kaitlynn Alvarez MD   albuterol sulfate HFA (PROVENTIL;VENTOLIN;PROAIR) 108 (90 Base) MCG/ACT inhaler INHALE 2 PUFFS BY MOUTH 4 TIMES DAILY AS NEEDED FOR WHEEZING 10/14/22   Kaitlynn Alvarez MD   DULoxetine (CYMBALTA) 60 MG extended release capsule TAKE 1 CAPSULE BY MOUTH ONCE DAILY 10/14/22 8/18/23  Kaitlynn Alvarez MD   betamethasone valerate (VALISONE) 0.1 % lotion Apply topically 2 times daily prn scalp rash 10/14/22   Kaitlynn Alvarez MD   spironolactone (ALDACTONE) 100 MG tablet Take 100 mg by mouth 2 times daily    Historical Provider, MD   nadolol (CORGARD) 40 MG tablet Take 1 tablet by mouth in the morning.   Patient taking differently: Take 40 mg by mouth nightly 7/20/22 12/9/22  Tasia Claude, APRN - CNP   lactulose [facility]) 10 GM/15ML solution Take 30 mLs by mouth 3 times daily Hold if having diarrhea 3/21/22   Haris Awad MD   Multiple Vitamin ([facility] VITAMIN MENS PO) Take 1 tablet by mouth daily    Historical Provider, MD   ESOMEPRAZOLE MAGNESIUM PO Take 20 mg by mouth daily OTC    Historical Provider, MD   [place]-3 1000 MG CAPS Take 2,000 mg by mouth daily    Historical Provider, MD       Physical Exam: Need 8 Elements   Physical Exam     General: NAD  Eyes: EOMI  ENT: neck supple  Cardiovascular: Regular rate. Respiratory: Clear to auscultation  Gastrointestinal: , Distended, soft nontender. Genitourinary: no suprapubic tenderness  Musculoskeletal: No edema  Skin: warm, dry  Neuro: Somnolent. Sleepy and drowsy. Follows simple commands, moves all extremities x4. Alert and oriented to person. Denies pain  Psych: Mood appropriate. Past Medical History:   PMHx   Past Medical History:   Diagnosis Date    Anxiety     Cirrhosis, alcoholic ([facility]     Diabetes mellitus ([facility] [address]     GERD (gastroesophageal reflux disease) 2000    GERD (gastroesophageal reflux disease)     Hyperlipidemia     Hypertension     Portal hypertension (HCC)     Pulmonary nodules     Sleep apnea     SOB (shortness of breath)     Thyroid disease      PSHX:  has a past surgical history that includes Cholecystectomy; Vasectomy; Colonoscopy; Endoscopy, colon, diagnostic; Foot surgery; Upper gastrointestinal endoscopy (N/A, 1/19/2022); Tonsillectomy; and Appendectomy. Allergies: Allergies   Allergen Reactions    Lisinopril Swelling     Tongue swelling      Zetia [Ezetimibe] Swelling     Facial swelling    Atorvastatin     Codeine Other (See Comments)     Blood pressure drops    Hydrochlorothiazide     Hydroxyzine      Fatigue and depressed    Lexapro [Escitalopram Oxalate]      Increased depression    Pravastatin      Fam HX:  family history includes Diabetes in an other family member; Hypertension in his brother.   Soc HX:   Social History     Socioeconomic History    Marital status:    Occupational History     Comment: Retired    Tobacco Use    Smoking status: Former     Packs/day: 1.00     Years: 14.00     Pack years: 14.00     Types: Cigarettes     Quit date: 12     Years since quittin.0    Smokeless tobacco: Never   Vaping Use    Vaping Use: Never used   Substance and Sexual Activity    Alcohol use: Not Currently    Drug use: Never    Sexual activity: Yes     Partners: Female     Social Determinants of Health     Financial Resource Strain: Low Risk     Difficulty of Paying Living Expenses: Not hard at all   Food Insecurity: No Food Insecurity    Worried About Running Out of Food in the Last Year: Never true    Ran Out of Food in the Last Year: Never true       Medications:   Medications:    Infusions:   PRN Meds:     Labs      CBC:   Recent Labs     23  1900   WBC 7.5   HGB 12.3*        BMP:    Recent Labs     23  1207      K 4.6      CO2 24   BUN 31*   CREATININE 1.5*   GLUCOSE 110*     Hepatic:   Recent Labs     23  1207   AST 45*   ALT 29   BILITOT 0.9   ALKPHOS 89     Lipids:   Lab Results   Component Value Date/Time    CHOL 174 2022 03:25 AM    CHOL 230 10/14/2019 01:45 PM    HDL 28 2022 03:25 AM    TRIG 130 2022 03:25 AM     Hemoglobin A1C:   Lab Results   Component Value Date/Time    LABA1C 5.5 2022 04:17 PM     TSH:   Lab Results   Component Value Date/Time    TSH 1.39 10/14/2019 01:45 PM     Troponin:   Lab Results   Component Value Date/Time    TROPONINT 0.012 2023 12:07 PM    TROPONINT <0.010 10/04/2022 11:11 AM    TROPONINT 0.017 2022 04:25 PM     Lactic Acid: No results for input(s): LACTA in the last 72 hours. BNP: No results for input(s): PROBNP in the last 72 hours.   UA:  Lab Results   Component Value Date/Time    NITRU NEGATIVE 2023 03:50 PM    COLORU YELLOW 2023 03:50 PM    WBCUA 1 2023 03:50 PM    RBCUA 1 2023 03:50 PM    MUCUS RARE 2022 12:10 PM    TRICHOMONAS NONE SEEN 2023 03:50 PM    BACTERIA NEGATIVE 2023 03:50 PM    CLARITYU CLEAR 2023 03:50 PM    SPECGRAV 1.015 01/04/2023 03:50 PM    LEUKOCYTESUR NEGATIVE 01/04/2023 03:50 PM    UROBILINOGEN 0.2 01/04/2023 03:50 PM    BILIRUBINUR NEGATIVE 01/04/2023 03:50 PM    BLOODU TRACE 01/04/2023 03:50 PM    KETUA NEGATIVE 01/04/2023 03:50 PM     Urine Cultures: No results found for: Gumaro Aspen  Blood Cultures: No results found for: BC  No results found for: BLOODCULT2  Organism: No results found for: ORG    Imaging/Diagnostics Last 24 Hours   CT HEAD WO CONTRAST    Result Date: 1/4/2023  EXAMINATION: CT OF THE HEAD WITHOUT CONTRAST  1/4/2023 12:44 pm TECHNIQUE: CT of the head was performed without the administration of intravenous contrast. Automated exposure control, iterative reconstruction, and/or weight based adjustment of the mA/kV was utilized to reduce the radiation dose to as low as reasonably achievable. COMPARISON: 11/12/2022, multiple prior head CTs dating back to 02/11/2022 HISTORY: ORDERING SYSTEM PROVIDED HISTORY: ams TECHNOLOGIST PROVIDED HISTORY: Reason for exam:->ams Has a \"code stroke\" or \"stroke alert\" been called? ->No Decision Support Exception - unselect if not a suspected or confirmed emergency medical condition->Emergency Medical Condition (MA) Reason for Exam: ams FINDINGS: BRAIN/VENTRICLES: There is no acute intracranial hemorrhage, mass effect or midline shift. No abnormal extra-axial fluid collection. The gray-white differentiation is maintained without evidence of an acute infarct. There is no evidence of hydrocephalus. Mild cortical atrophy, white matter changes consistent with microvascular degenerative disease, atherosclerotic calcification in the distal internal carotid arteries bilaterally and distal right vertebral artery uncertain hemodynamic significance. ORBITS: The visualized portion of the orbits demonstrate no acute abnormality. SINUSES: The visualized paranasal sinuses and mastoid air cells demonstrate no acute abnormality.  SOFT TISSUES/SKULL:  No acute abnormality of the visualized skull or soft tissues. No noncontrast CT evidence of acute intracranial pathology. No CT etiology for patient's clinical complaint of altered mental status noted on this study. Cortical atrophy, white matter changes consistent with microvascular degenerative disease, atherosclerotic calcification of the distal internal carotid and right vertebral arteries of uncertain hemodynamic significance     XR CHEST PORTABLE    Result Date: 1/4/2023  EXAMINATION: ONE XRAY VIEW OF THE CHEST 1/4/2023 12:42 pm COMPARISON: 12/09/2022 HISTORY: ORDERING SYSTEM PROVIDED HISTORY: Chest Pain TECHNOLOGIST PROVIDED HISTORY: Reason for exam:->Chest Pain Reason for Exam: chest pain FINDINGS: Heart size stable. Left lung clear. Similar appearing pleural and parenchymal opacities throughout the right hemithorax. No pneumothorax. There appears to be a persistent right pleural fluid and parenchymal opacification the right lung suggesting atelectasis     CTA HEAD NECK W CONTRAST    Result Date: 1/4/2023  EXAMINATION: CTA OF THE HEAD AND NECK WITH CONTRAST 1/4/2023 5:09 pm: TECHNIQUE: CTA of the head and neck was performed with the administration of intravenous contrast. Multiplanar reformatted images are provided for review. MIP images are provided for review. Stenosis of the internal carotid arteries measured using NASCET criteria. Automated exposure control, iterative reconstruction, and/or weight based adjustment of the mA/kV was utilized to reduce the radiation dose to as low as reasonably achievable. COMPARISON: None. HISTORY: ORDERING SYSTEM PROVIDED HISTORY: ams TECHNOLOGIST PROVIDED HISTORY: Reason for exam:->ams Has a \"code stroke\" or \"stroke alert\" been called? ->No Decision Support Exception - unselect if not a suspected or confirmed emergency medical condition->Emergency Medical Condition (MA) Reason for Exam: ams Additional signs and symptoms: no Relevant Medical/Surgical History: 80 ml isovue 370 used FINDINGS: CTA NECK: AORTIC ARCH/ARCH VESSELS: No dissection or arterial injury. No significant stenosis of the brachiocephalic or subclavian arteries. CAROTID ARTERIES: No dissection, arterial injury, or hemodynamically significant stenosis by NASCET criteria. VERTEBRAL ARTERIES: No dissection, arterial injury, or significant stenosis. SOFT TISSUES: There is a large right pleural effusion with a partially visualized mass within the right upper lobe measuring at least 6.6 x 5.8 cm in diameter. Mild mediastinal adenopathy is also noted. BONES: No acute osseous abnormality. CTA HEAD: ANTERIOR CIRCULATION: The anterior and middle cerebral arteries demonstrate normal arborization patterns. There is asymmetric narrowing and attenuation of the left MCA and branch vessels. No aneurysm. POSTERIOR CIRCULATION: No significant stenosis of the vertebral, basilar, or posterior cerebral arteries. No aneurysm. OTHER: No dural venous sinus thrombosis on this non-dedicated study. BRAIN: See separately dictated noncontrast head CT report. No large vessel occlusion visualized within the head or neck. Asymmetric narrowing and attenuation of the left MCA and branch vessels. Recommend digital subtraction angiography for further evaluation. Incidentally noted pleural effusion and partially visualized right upper lobe mass. Please refer to the separately dictated CT chest report for further details and management recommendations. CT CHEST ABDOMEN PELVIS W CONTRAST Additional Contrast? None    Result Date: 1/4/2023  EXAMINATION: CT OF THE CHEST, ABDOMEN, AND PELVIS WITH CONTRAST 1/4/2023 5:11 pm TECHNIQUE: CT of the chest, abdomen and pelvis was performed with the administration of intravenous contrast. Multiplanar reformatted images are provided for review.  Automated exposure control, iterative reconstruction, and/or weight based adjustment of the mA/kV was utilized to reduce the radiation dose to as low as reasonably achievable. COMPARISON: September 13, 2021 HISTORY: ORDERING SYSTEM PROVIDED HISTORY: hx of liver cirrhosis TECHNOLOGIST PROVIDED HISTORY: Reason for exam:->hx of liver cirrhosis Additional Contrast?->None Reason for Exam: hx of liver cirrhosis. sob Additional signs and symptoms: no Relevant Medical/Surgical History: 80 ml isovue 370 used FINDINGS: Chest: Mediastinum: Thoracic aorta is nonaneurysmal.  No evidence of dissection. Heart and pericardium appear unremarkable. Scattered mediastinal nodes but no evidence of significant lymphadenopathy. Largest paratracheal node measures up to 8.1 mm. Main pulmonary artery is enlarged measuring 3.2 cm but no evidence of acute pulmonary embolism. Lungs/pleura: A large right pleural effusion is noted. 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 Atelectatic changes seen in the right upper and right lower lobes with collapse of the entire right lower lobe. Left lung demonstrates no active infiltrates. Minimal atelectatic changes. Soft Tissues/Bones: No acute abnormality. Abdomen/Pelvis: Organs: The liver demonstrates severe cirrhosis with nodularity and shrinkage. No focal masses. Gallbladder is not seen and presumably removed. Pancreas and spleen appear unremarkable. No evidence of splenomegaly at this time. Adrenals, kidneys, aorta and IVC appear stable. Aorta is calcified but nonaneurysmal. GI/Bowel: No evidence of bowel obstruction or perforation. Mild constipation and stool impaction in the rectum. Small bowel appears unremarkable. No evidence of significant varices. Pelvis: Urinary bladder, prostate and seminal vesicles appear stable. Scattered bilateral pelvic nodes which are not significantly enlarged. Left inguinal hernia containing ascitic fluid. Peritoneum/Retroperitoneum: Moderate ascites and mesenteric congestion. Retroperitoneal lymphadenopathy with nodes measuring up to 1.4 cm in the aortocaval region. This was previously seen as well.  Bones/Soft Tissues: No acute abnormality. 1. Large right pleural effusion and atelectatic changes in the right upper and right lower lobe with significantly compromised aeration in the right lung. 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 Minimal atelectatic changes left lower lobe. 2. Prominent main pulmonary artery measuring 3.2 cm but no acute pulmonary embolism. Aorta is nonaneurysmal and there is no evidence of dissection. 3. Moderate ascites in the abdomen and pelvis. Mesenteric congestion. 4. Hepatic cirrhosis with nodularity and some shrinkage but no focal abnormality. 5. Redemonstration of retroperitoneal lymphadenopathy. 6. Left inguinal hernia containing ascitic fluid.        Personally reviewed Lab Studies, Imaging, and discussed case with Dr Reinoso Channel    Electronically signed by MOHINDER Schmidt CNP on 1/4/2023 at 7:39 PM

## 2023-01-05 NOTE — PROGRESS NOTES
V2.0  [facility] [place]      Name:  Carolynn Oliver /Age/Sex: 1945  (78 y.o. male)   MRN & CSN:  [id number] & [id number] Encounter Date/Time: 2023 2:51 PM EST    Location:  [id number]/[id number] PCP: Narinder Ceballos MD       Hospital Day: 2    Assessment and Plan:   Carolynn Oliver is a 78 y.o. male  who presents with AMS (altered mental status)    1. Hepatic encephalopathy  -Patient has history of alcoholic cirrhosis  -Status post paracentesis  -Resume Xifaxan 550 mg twice daily  -Head CT negative for any acute intracranial process  -GI consulted and following    2. Hepatic cirrhosis  -Secondary to alcoholic liver disease  -History of varices but no bleed  -Follows up with GI  -Recommended to follow-up with hepatology at [facility] and deemed not a candidate for TIPS  -Resume home medications nadolol/spironolactone/Lasix    3. Ascites  -Status post paracentesis  -History of multiple paracentesis  -Ascitic fluid WBC  238  -Continue to follow  -Continue diuretics    4. Right hydrothorax  -IR consulted for right thoracentesis    5. Hypertension  -Resume home medications    6. Hypothyroidism  -Resume Synthroid              Diet ADULT DIET; Regular; Low Sodium (2 gm)   DVT Prophylaxis [] Lovenox, []  Heparin, [] SCDs, [] Ambulation,  [] Eliquis, [] Xarelto  [] Coumadin   Code Status Full Code   Disposition From: Home  Expected Disposition: Home  Estimated Date of Discharge: 2023  Patient requires continued admission due to hepatic encephalopathy   Surrogate Decision Maker/ POA      Subjective:     Chief Complaint: Altered Mental Status (Arrived per EMS from home, altered since last night per wife. Hx of AMS w/ elevated ammonia- some right sided weakness, drowsy during triage)     Patient seen and examined at bedside this morning. Appears mildly confused but is able to respond and communicate in full sentences. Status post paracentesis this morning.   Denies any abdominal pain, chest pain, shortness of breath, nausea or vomiting, fever or chills. Objective: Intake/Output Summary (Last 24 hours) at 1/5/2023 1451  Last data filed at 1/5/2023 0944  Gross per 24 hour   Intake 240 ml   Output 4620 ml   Net -4380 ml        Vitals:   Vitals:    01/05/23 1352   BP: 135/74   Pulse: 77   Resp: 18   Temp: 97.4 °F (36.3 °C)   SpO2: 100%       Physical Exam:     General: NAD  Eyes: EOMI  ENT: neck supple  Cardiovascular: Regular rate. Respiratory: Decreased breath sounds on the right  Gastrointestinal: Soft, non tender  Genitourinary: no suprapubic tenderness  Musculoskeletal: No edema  Skin: warm, dry  Neuro: Alert. Psych: Mood appropriate.      Medications:   Medications:    pantoprazole  20 mg Oral QAM AC    thiamine (VITAMIN B1) IVPB  500 mg IntraVENous Q8H    Followed by    Lindsey Mayes ON 1/6/2023] thiamine (VITAMIN B1) IVPB  250 mg IntraVENous Q24H    Followed by    Lindsey Mayes ON 1/11/2023] thiamine  100 mg Oral Daily    rifAXIMin  550 mg Oral BID    sodium chloride flush  5-40 mL IntraVENous 2 times per day    DULoxetine  60 mg Oral Daily    furosemide  40 mg Oral Daily    lactulose  20 g Oral TID    levothyroxine  50 mcg Oral Daily    nadolol  40 mg Oral Daily    spironolactone  100 mg Oral BID    omega-3 acid ethyl esters  2,000 mg Oral Daily      Infusions:    sodium chloride       PRN Meds: albuterol sulfate HFA, 2 puff, Q4H PRN  sodium chloride flush, 5-40 mL, PRN  sodium chloride, , PRN  ondansetron, 4 mg, Q8H PRN   Or  ondansetron, 4 mg, Q6H PRN        Labs      Recent Results (from the past 24 hour(s))   COVID-19, Rapid    Collection Time: 01/04/23  3:35 PM    Specimen: Nasopharyngeal   Result Value Ref Range    Source NARES     SARS-CoV-2, NAAT NOT DETECTED NOT DETECTED   Rapid Flu Swab    Collection Time: 01/04/23  3:35 PM    Specimen: Nasopharyngeal   Result Value Ref Range    Rapid Influenza A Ag NEGATIVE NEGATIVE    Rapid Influenza B Ag NEGATIVE NEGATIVE   Urinalysis with Reflex to Culture Collection Time: 01/04/23  3:50 PM    Specimen: Urine   Result Value Ref Range    Color, UA YELLOW YELLOW    Clarity, UA CLEAR CLEAR    Glucose, Urine NEGATIVE NEGATIVE MG/DL    Bilirubin Urine NEGATIVE NEGATIVE MG/DL    Ketones, Urine NEGATIVE NEGATIVE MG/DL    Specific Gravity, UA 1.015 1.001 - 1.035    Blood, Urine TRACE (A) NEGATIVE    pH, Urine 6.5 5.0 - 8.0    Protein, UA NEGATIVE NEGATIVE MG/DL    Urobilinogen, Urine 0.2 0.2 - 1.0 MG/DL    Nitrite Urine, Quantitative NEGATIVE NEGATIVE    Leukocyte Esterase, Urine NEGATIVE NEGATIVE    RBC, UA 1 0 - 3 /HPF    WBC, UA 1 0 - 2 /HPF    Bacteria, UA NEGATIVE NEGATIVE /HPF    Squam Epithel, UA <1 /HPF    Trichomonas, UA NONE SEEN NONE SEEN /HPF   Drug screen multi urine    Collection Time: 01/04/23  4:00 PM   Result Value Ref Range    Cannabinoid Scrn, Ur NEGATIVE NEGATIVE    Amphetamines NEGATIVE NEGATIVE    Cocaine Metabolite NEGATIVE NEGATIVE    Benzodiazepine Screen, Urine NEGATIVE NEGATIVE    Barbiturate Screen, Ur NEGATIVE NEGATIVE    Opiates, Urine NEGATIVE NEGATIVE    Phencyclidine, Urine NEGATIVE NEGATIVE    Oxycodone NEGATIVE NEGATIVE   CBC with Auto Differential    Collection Time: 01/04/23  7:00 PM   Result Value Ref Range    WBC 7.5 4.0 - 10.5 K/CU MM    RBC 3.79 (L) 4.6 - 6.2 M/CU MM    Hemoglobin 12.3 (L) 13.5 - 18.0 GM/DL    Hematocrit 36.8 (L) 42 - 52 %    MCV 97.1 78 - 100 FL    MCH 32.5 (H) 27 - 31 PG    MCHC 33.4 32.0 - 36.0 %    RDW 13.2 11.7 - 14.9 %    Platelets 403 629 - 287 K/CU MM    MPV 10.5 7.5 - 11.1 FL    Differential Type AUTOMATED DIFFERENTIAL     Segs Relative 73.9 (H) 36 - 66 %    Lymphocytes % 9.9 (L) 24 - 44 %    Monocytes % 11.5 (H) 0 - 4 %    Eosinophils % 3.9 (H) 0 - 3 %    Basophils % 0.7 0 - 1 %    Segs Absolute 5.5 K/CU MM    Lymphocytes Absolute 0.7 K/CU MM    Monocytes Absolute 0.9 K/CU MM    Eosinophils Absolute 0.3 K/CU MM    Basophils Absolute 0.1 K/CU MM    Nucleated RBC % 0.0 %    Total Nucleated RBC 0.0 K/CU MM Total Immature Neutrophil 0.01 K/CU MM    Immature Neutrophil % 0.1 0 - 0.43 %   Troponin    Collection Time: 01/04/23  7:00 PM   Result Value Ref Range    Troponin T 0.014 (H) <0.01 NG/ML   Culture, Blood 2    Collection Time: 01/04/23  7:00 PM    Specimen: Blood   Result Value Ref Range    Specimen BLOOD     Special Requests       [facility]23 AT 1312 BY [id number] RB   Brain Natriuretic Peptide    Collection Time: 01/04/23  7:00 PM   Result Value Ref Range    Pro-.7 (H) <300 PG/ML   Hepatic Function Panel    Collection Time: 01/04/23  7:00 PM   Result Value Ref Range    Albumin 2.7 (L) 3.4 - 5.0 GM/DL    Total Bilirubin 1.1 (H) 0.0 - 1.0 MG/DL    Bilirubin, Direct 0.2 0.0 - 0.3 MG/DL    Bilirubin, Indirect 0.9 (H) 0 - 0.7 MG/DL    Alkaline Phosphatase 81 40 - 129 IU/L    AST 38 (H) 15 - 37 IU/L    ALT 25 10 - 40 U/L    Total Protein 7.1 6.4 - 8.2 GM/DL   TSH without Reflex    Collection Time: 01/04/23  7:00 PM   Result Value Ref Range    TSH, High Sensitivity 1.080 0.270 - 4.20 uIu/ml   T4, free    Collection Time: 01/04/23  7:00 PM   Result Value Ref Range    T4 Free 1.03 0.9 - 1.8 NG/DL   Culture, Blood 1    Collection Time: 01/04/23  7:15 PM    Specimen: Blood   Result Value Ref Range    Specimen BLOOD     Special Requests NONE    Comprehensive Metabolic Panel w/ Reflex to MG    Collection Time: 01/04/23 11:35 PM   Result Value Ref Range    Sodium 141 135 - 145 MMOL/L    Potassium 4.0 3.5 - 5.1 MMOL/L    Chloride 103 99 - 110 mMol/L    CO2 30 21 - 32 MMOL/L    BUN 28 (H) 6 - 23 MG/DL    Creatinine 1.2 0.9 - 1.3 MG/DL    Est, Glom Filt Rate >60 >60 mL/min/1.73m2    Glucose 99 70 - 99 MG/DL    Calcium 9.6 8.3 - 10.6 MG/DL    Albumin 3.0 (L) 3.4 - 5.0 GM/DL    Total Protein 6.7 6.4 - 8.2 GM/DL    Total Bilirubin 1.3 (H) 0.0 - 1.0 MG/DL    ALT 27 10 - 40 U/L    AST 39 (H) 15 - 37 IU/L    Alkaline Phosphatase 88 40 - 129 IU/L    Anion Gap 8 4 - 16   Vitamin D 25 hydroxy    Collection Time: 01/04/23 11:35 PM   Result Value Ref Range    Vit D, 25-Hydroxy 19.20 (L) >20 NG/ML   Lipid Panel    Collection Time: 01/05/23 12:06 AM   Result Value Ref Range    Triglycerides 73 <150 MG/DL    Cholesterol 179 <200 MG/DL    HDL 51 >40 MG/DL    LDL Calculated 113 (H) <100 MG/DL   EKG 12 Lead    Collection Time: 01/05/23 12:59 AM   Result Value Ref Range    Ventricular Rate 74 BPM    Atrial Rate 74 BPM    P-R Interval 152 ms    QRS Duration 84 ms    Q-T Interval 390 ms    QTc Calculation (Bazett) 432 ms    P Axis 66 degrees    R Axis 69 degrees    T Axis 19 degrees    Diagnosis       Normal sinus rhythm  Low voltage QRS  Borderline ECG  When compared with ECG of 04-JAN-2023 11:44,  No significant change was found     Cell Count with Differential, Body Fluid    Collection Time: 01/05/23  7:15 AM   Result Value Ref Range    Fluid Type PERITONEAL FLUID INDEX    RBC, Fluid 34 /CU MM    WBC, Fluid 238 /CU MM    Neutrophil Count, Fluid 6 %    Lymphocytes, Body Fluid 47 %    Monocyte Count, Fluid 47 %    Mesothelial, Fluid 5 /100 WBC   Protein, Body Fluid    Collection Time: 01/05/23  7:15 AM   Result Value Ref Range    Protein, Fluid 1.0 GM/DL   Albumin, Body Fluid    Collection Time: 01/05/23  7:15 AM   Result Value Ref Range    Albumin, Fluid . 4 GM/DL    Source, Fluid FLUID SPECIMEN SOURCE - ASCITES FLUID         Imaging/Diagnostics Last 24 Hours   CT HEAD WO CONTRAST    Result Date: 1/4/2023  EXAMINATION: CT OF THE HEAD WITHOUT CONTRAST  1/4/2023 12:44 pm TECHNIQUE: CT of the head was performed without the administration of intravenous contrast. Automated exposure control, iterative reconstruction, and/or weight based adjustment of the mA/kV was utilized to reduce the radiation dose to as low as reasonably achievable.  COMPARISON: 11/12/2022, multiple prior head CTs dating back to 02/11/2022 HISTORY: ORDERING SYSTEM PROVIDED HISTORY: ams TECHNOLOGIST PROVIDED HISTORY: Reason for exam:->ams Has a \"code stroke\" or \"stroke alert\" been called? ->No Decision Support Exception - unselect if not a suspected or confirmed emergency medical condition->Emergency Medical Condition (MA) Reason for Exam: ams FINDINGS: BRAIN/VENTRICLES: There is no acute intracranial hemorrhage, mass effect or midline shift. No abnormal extra-axial fluid collection. The gray-white differentiation is maintained without evidence of an acute infarct. There is no evidence of hydrocephalus. Mild cortical atrophy, white matter changes consistent with microvascular degenerative disease, atherosclerotic calcification in the distal internal carotid arteries bilaterally and distal right vertebral artery uncertain hemodynamic significance. ORBITS: The visualized portion of the orbits demonstrate no acute abnormality. SINUSES: The visualized paranasal sinuses and mastoid air cells demonstrate no acute abnormality. SOFT TISSUES/SKULL:  No acute abnormality of the visualized skull or soft tissues. No noncontrast CT evidence of acute intracranial pathology. No CT etiology for patient's clinical complaint of altered mental status noted on this study. Cortical atrophy, white matter changes consistent with microvascular degenerative disease, atherosclerotic calcification of the distal internal carotid and right vertebral arteries of uncertain hemodynamic significance     XR CHEST PORTABLE    Result Date: 1/4/2023  EXAMINATION: ONE XRAY VIEW OF THE CHEST 1/4/2023 12:42 pm COMPARISON: 12/09/2022 HISTORY: ORDERING SYSTEM PROVIDED HISTORY: Chest Pain TECHNOLOGIST PROVIDED HISTORY: Reason for exam:->Chest Pain Reason for Exam: chest pain FINDINGS: Heart size stable. Left lung clear. Similar appearing pleural and parenchymal opacities throughout the right hemithorax. No pneumothorax.      There appears to be a persistent right pleural fluid and parenchymal opacification the right lung suggesting atelectasis     IR US GUIDED PARACENTESIS    Result Date: 1/5/2023  PROCEDURE: PARACENTESIS WITHOUT IMAGE GUIDANCE US ABDOMEN LIMITED 1/5/2023 HISTORY: ORDERING SYSTEM PROVIDED HISTORY: us- guided paracentesis TECHNOLOGIST PROVIDED HISTORY: Remove as much fluid as possible Please send fluid for: cell count and diff, culture and sensitivity, total protein, albumin Please give 6-8 grams albumin IV for each liter fluid removed Please innoculate fluid immediately into aerobic and non-aerobic blood culture bottles for the culture specimen Reason for exam:->us- guided paracentesis TECHNIQUE: Maximum sterile barrier technique including hand hygiene, skin prep and sterile ultrasound technique utilized for procedure. Sterile ultrasound technique also utilized for procedure Ultrasound guidance required for procedure to confirm presence of ascites, puncture site selection, real-time intra procedural guidance. Images made for patient's medical file. Informed consent was obtained after a detailed explanation of the procedure including risks, benefits, and alternatives. Universal protocol was followed. A limited ultrasound of the abdomen was performed. The right abdomen was prepped and draped in sterile fashion and local anesthesia was achieved with lidocaine. An 6 [place] needle sheath was advanced into ascites and paracentesis was performed. The patient tolerated the procedure well. FINDINGS: Limited ultrasound of the abdomen demonstrates ascites with the paracentesis catheter within it. A total of 4620 mL cloudy white chylous appearing fluid was removed. 1020 mL sample sent for laboratory evaluation. 25 g intravenous albumin utilized for procedure. No complication suggested. Successful ultrasound-guided paracentesis with specimen sent for laboratory evaluation.      CTA HEAD NECK W CONTRAST    Result Date: 1/4/2023  EXAMINATION: CTA OF THE HEAD AND NECK WITH CONTRAST 1/4/2023 5:09 pm: TECHNIQUE: CTA of the head and neck was performed with the administration of intravenous contrast. Multiplanar reformatted images are provided for review. MIP images are provided for review. Stenosis of the internal carotid arteries measured using NASCET criteria. Automated exposure control, iterative reconstruction, and/or weight based adjustment of the mA/kV was utilized to reduce the radiation dose to as low as reasonably achievable. COMPARISON: None. HISTORY: ORDERING SYSTEM PROVIDED HISTORY: ams TECHNOLOGIST PROVIDED HISTORY: Reason for exam:->ams Has a \"code stroke\" or \"stroke alert\" been called? ->No Decision Support Exception - unselect if not a suspected or confirmed emergency medical condition->Emergency Medical Condition (MA) Reason for Exam: ams Additional signs and symptoms: no Relevant Medical/Surgical History: 80 ml isovue 370 used FINDINGS: CTA NECK: AORTIC ARCH/ARCH VESSELS: No dissection or arterial injury. No significant stenosis of the brachiocephalic or subclavian arteries. CAROTID ARTERIES: No dissection, arterial injury, or hemodynamically significant stenosis by NASCET criteria. VERTEBRAL ARTERIES: No dissection, arterial injury, or significant stenosis. SOFT TISSUES: There is a large right pleural effusion with a partially visualized mass within the right upper lobe measuring at least 6.6 x 5.8 cm in diameter. Mild mediastinal adenopathy is also noted. BONES: No acute osseous abnormality. CTA HEAD: ANTERIOR CIRCULATION: The anterior and middle cerebral arteries demonstrate normal arborization patterns. There is asymmetric narrowing and attenuation of the left MCA and branch vessels. No aneurysm. POSTERIOR CIRCULATION: No significant stenosis of the vertebral, basilar, or posterior cerebral arteries. No aneurysm. OTHER: No dural venous sinus thrombosis on this non-dedicated study. BRAIN: See separately dictated noncontrast head CT report. No large vessel occlusion visualized within the head or neck. Asymmetric narrowing and attenuation of the left MCA and branch vessels.  Recommend digital subtraction angiography for further evaluation. Incidentally noted pleural effusion and partially visualized right upper lobe mass. Please refer to the separately dictated CT chest report for further details and management recommendations. CT CHEST ABDOMEN PELVIS W CONTRAST Additional Contrast? None    Result Date: 1/4/2023  EXAMINATION: CT OF THE CHEST, ABDOMEN, AND PELVIS WITH CONTRAST 1/4/2023 5:11 pm TECHNIQUE: CT of the chest, abdomen and pelvis was performed with the administration of intravenous contrast. Multiplanar reformatted images are provided for review. Automated exposure control, iterative reconstruction, and/or weight based adjustment of the mA/kV was utilized to reduce the radiation dose to as low as reasonably achievable. COMPARISON: September 13, 2021 HISTORY: ORDERING SYSTEM PROVIDED HISTORY: hx of liver cirrhosis TECHNOLOGIST PROVIDED HISTORY: Reason for exam:->hx of liver cirrhosis Additional Contrast?->None Reason for Exam: hx of liver cirrhosis. sob Additional signs and symptoms: no Relevant Medical/Surgical History: 80 ml isovue 370 used FINDINGS: Chest: Mediastinum: Thoracic aorta is nonaneurysmal.  No evidence of dissection. Heart and pericardium appear unremarkable. Scattered mediastinal nodes but no evidence of significant lymphadenopathy. Largest paratracheal node measures up to 8.1 mm. Main pulmonary artery is enlarged measuring 3.2 cm but no evidence of acute pulmonary embolism. Lungs/pleura: A large right pleural effusion is noted. 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 Atelectatic changes seen in the right upper and right lower lobes with collapse of the entire right lower lobe. Left lung demonstrates no active infiltrates. Minimal atelectatic changes. Soft Tissues/Bones: No acute abnormality. Abdomen/Pelvis: Organs: The liver demonstrates severe cirrhosis with nodularity and shrinkage. No focal masses. Gallbladder is not seen and presumably removed.  Pancreas and spleen appear unremarkable. No evidence of splenomegaly at this time. Adrenals, kidneys, aorta and IVC appear stable. Aorta is calcified but nonaneurysmal. GI/Bowel: No evidence of bowel obstruction or perforation. Mild constipation and stool impaction in the rectum. Small bowel appears unremarkable. No evidence of significant varices. Pelvis: Urinary bladder, prostate and seminal vesicles appear stable. Scattered bilateral pelvic nodes which are not significantly enlarged. Left inguinal hernia containing ascitic fluid. Peritoneum/Retroperitoneum: Moderate ascites and mesenteric congestion. Retroperitoneal lymphadenopathy with nodes measuring up to 1.4 cm in the aortocaval region. This was previously seen as well. Bones/Soft Tissues: No acute abnormality. 1. Large right pleural effusion and atelectatic changes in the right upper and right lower lobe with significantly compromised aeration in the right lung. 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 Minimal atelectatic changes left lower lobe. 2. Prominent main pulmonary artery measuring 3.2 cm but no acute pulmonary embolism. Aorta is nonaneurysmal and there is no evidence of dissection. 3. Moderate ascites in the abdomen and pelvis. Mesenteric congestion. 4. Hepatic cirrhosis with nodularity and some shrinkage but no focal abnormality. 5. Redemonstration of retroperitoneal lymphadenopathy. 6. Left inguinal hernia containing ascitic fluid.        Electronically signed by Artemio Johansen MD on 1/5/2023 at 2:51 PM

## 2023-01-05 NOTE — PROGRESS NOTES
Speech Language Pathology  Facility/Department: [address] [place] [address]   CLINICAL BEDSIDE SWALLOW EVALUATION    NAME: Olivia Bueno  : 1945  MRN: [id number]    IMPRESSIONS AND RECOMMENDATIONS: Olivia Bueno was referred for a bedside swallow evaluation following admission to [facility] with altered mental status/hepatic encephalopathy, recurrent right-sided pleural effusion, abdominal ascites. Medical hx includes alcoholic liver cirrhosis, portal hypertension, esophageal varices, CKD, HTN, hypothyroidism. He has been seen by SLP during previous admissions; last seen 2022 and recommended regular diet/thin liquids. Pt seen for evaluation seated upright in chair, alert, cooperative. Wife and daughter present throughout. Oral mechanism examination WFL without asymmetry. Pt presented with PO trials of thin liquids via cup/straw, puree, and regular solids. Oral stage WFL with intact labial seal, mastication, AP transit, oral clearance. Pharyngeal stage WFL with adequate swallow initiation/laryngeal elevation. No s/s aspiration across all trials. Recommend continued regular diet/thin liquids. No further acute SLP needs identified. ADMISSION DATE: 2023  ADMITTING DIAGNOSIS: has Shortness of breath; History of colonic polyps; Ascites due to alcoholic cirrhosis ([address] Mixed hyperlipidemia; Hypertension; History of alcohol abuse; Gastroesophageal reflux disease; Anxiety; Pulmonary nodules; Hyperglycemia; Acquired hypothyroidism; Class 3 severe obesity with body mass index (BMI) of 40.0 to 44.9 in [facility]); Bilateral hearing loss; Increased ammonia level; Portal hypertension (HCC); SOB (shortness of breath); Cirrhosis of liver with ascites ([address] Secondary esophageal varices without bleeding ([address] Gastric polyps; Hearing loss; Sepsis ([address] Hepatic encephalopathy; Recurrent right pleural effusion; Acute respiratory failure ([address]  Acute kidney injury superimposed on chronic kidney disease ([address] Generalized weakness; Oropharyngeal dysphagia; Cellulitis of left lower extremity; Type 2 diabetes mellitus without complication, without long-term current use of insulin ([facility] Thyroid disease; Type 2 diabetes mellitus with chronic kidney disease; Ex-smoker; Obesity (BMI 30-39.9); Recurrent pleural effusion on right; Pleural effusion; Hypoxia; AMS (altered mental status); and Sleep related hypoxia on their problem list.  ONSET DATE: this admission    Recent Chest Xray/CT of Chest: see chart    Date of Eval: 1/5/2023  Evaluating Therapist: MARISELA Ruby    Current Diet level:  Current Diet : Regular      Primary Complaint  Patient Complaint: denies current dysphagia symptoms    Pain:  Pain Assessment  Pain Assessment: None - Denies Pain  Pain Level: 0  Patient's Stated Pain Goal: 0 - No pain    Reason for Referral  Brittanie Greene was referred for a bedside swallow evaluation to assess the efficiency of his swallow function, identify signs and symptoms of aspiration and make recommendations regarding safe dietary consistencies, effective compensatory strategies, and safe eating environment. Impression  Dysphagia Diagnosis: Swallow function appears WFL;No clinical indicators of dysphagia  Dysphagia Outcome Severity Scale: Level 6: Within functional limits/Modified independence     Treatment Plan  Requires SLP Intervention: No  Duration of Treatment: N/A  D/C Recommendations: No follow up therapy recommended post discharge       Recommended Diet and Intervention        Recommended Form of Meds: PO          Compensatory Swallowing Strategies  Compensatory Swallowing Strategies : Upright as possible for all oral intake    Treatment/Goals  Short-term Goals  Timeframe for Short-term Goals: N/A    General  Chart Reviewed: Yes  Behavior/Cognition: Alert; Cooperative;Pleasant mood; Requires cueing  Respiratory Status: Room air  O2 Device: None (Room air)  Communication Observation:  (cognitive communication deficits)  Follows Directions: Simple  Dentition: Adequate  Patient Positioning: Upright in chair  Baseline Vocal Quality: Normal  Prior Dysphagia History: yes; last seen by SLP and [facility] September 2022  Consistencies Administered: Regular;Pureed; Thin - cup; Thin - straw           Vision/Hearing  Vision  Vision: Within Functional Limits  Hearing  Hearing: Exceptions to [facility]  Hearing Exceptions: Hard of hearing/hearing concerns    Oral Motor Deficits  Oral/Motor  Oral Hygiene: Moist;Clean    Oral Phase Dysfunction  Oral Phase  Oral Phase: WFL     Indicators of Pharyngeal Phase Dysfunction   Pharyngeal Phase   Pharyngeal Phase: WFL    Prognosis  Individuals consulted  Consulted and agree with results and recommendations: Patient; Family member  Family member consulted: wife, daughter    Education  Patient Education: recommendations  Patient Education Response: Verbalizes understanding  Safety Devices in place: Yes  Type of devices: Left in chair       Therapy Time  SLP Individual Minutes  Time In: 5707  Time Out: 3692  Minutes: [facility]-SLP  1/5/2023 2:11 PM

## 2023-01-05 NOTE — CONSULTS
[facility]             MD Raphael Rodriguez MD Lahomkandy Reinoso, APRN-CNP             [address] [facility] [address] [address], [address] [facility]             [phone] fax [phone]           Reason for Consult:  cirrhosis with ascites, PSE    Primary Care Physician:  Parth Spencer MD    History Obtained From:  patient, spouse    CC: altered mentation    HISTORY OF PRESENT ILLNESS:              The patient is a 78 y.o.  male known to me with cirrhosis (felt to be alcohol-induced) who has been sober for many months. His cirrhosis is complicated by moderate-large varices diagnosed by EGD 1/19/22 (which have never bled- currently on Nadolol), hepatic encephalopathy, recurrent ascites (despite maximal tolerated doses of Lasix and Aldactone) and hepatic hydrothorax. He has been refractory to diuretics and is being managed by regular paracentesis every 2 weeks and thoracentesis as needed. He was referred to [facility] 6 months ago for transplant and TIPS evaluation. It was felt that he was not a good candidate for TIPS as it likely would aggravate his underlying PSE. His MELD at that time was 10 so he was not seriously considered for transplant. They recommended follow up with hepatology but that has not been done since June              He was admitted now with increased weakness and fatigue along with altered mental status. NH4 was normal on admission. His BUN was 341 with creat of 1.5. He has had no fever, melena, hematochezia. His WBC on admission was 7.5 and Hb 12. 3. his MELD today is 9.    Of note is that Xifaxan is not listed on his meds  although his wife says that he has been taking it at home-- also he is on standard dose of Cymbalta for at least 6 months-- will continue for now but if confusion persists may want to decrease to 30 mg/d    Past Medical History:        Diagnosis Date    Anxiety     Cirrhosis, alcoholic ([facility]     Diabetes mellitus [facility]     GERD (gastroesophageal reflux disease) 2000    GERD (gastroesophageal reflux disease)     Hyperlipidemia     Hypertension     Portal hypertension (HCC)     Pulmonary nodules     Sleep apnea     SOB (shortness of breath)     Thyroid disease        Past Surgical History:        Procedure Laterality Date    APPENDECTOMY      CHOLECYSTECTOMY      COLONOSCOPY      ENDOSCOPY, COLON, DIAGNOSTIC      FOOT SURGERY      needle removed,not sure which foot, per wife. TONSILLECTOMY      UPPER GASTROINTESTINAL ENDOSCOPY N/A 1/19/2022    EGD BIOPSY performed by Jenise Armstrong MD at [address] [place] [address]         Medications Prior to Admission:    Prior to Admission medications    Medication Sig Start Date End Date Taking? Authorizing Provider   furosemide (LASIX) 40 MG tablet Take 1 tablet by mouth daily 11/22/22   Abbey Barron MD   levothyroxine (SYNTHROID) 50 MCG tablet Take 1 tablet by mouth Daily 10/14/22 4/12/23  Abbey Barron MD   albuterol sulfate HFA (PROVENTIL;VENTOLIN;PROAIR) 108 (90 Base) MCG/ACT inhaler INHALE 2 PUFFS BY MOUTH 4 TIMES DAILY AS NEEDED FOR WHEEZING 10/14/22   Abbey Barron MD   DULoxetine (CYMBALTA) 60 MG extended release capsule TAKE 1 CAPSULE BY MOUTH ONCE DAILY 10/14/22 8/18/23  Abbey Barron MD   betamethasone valerate (VALISONE) 0.1 % lotion Apply topically 2 times daily prn scalp rash 10/14/22   Abbey Barron MD   spironolactone (ALDACTONE) 100 MG tablet Take 100 mg by mouth 2 times daily    Historical Provider, MD   nadolol (CORGARD) 40 MG tablet Take 1 tablet by mouth in the morning.   Patient taking differently: Take 40 mg by mouth nightly 7/20/22 12/9/22  Halie Oliveira, APRN - CNP   lactulose [facility]) 10 GM/15ML solution Take 30 mLs by mouth 3 times daily Hold if having diarrhea 3/21/22   Ashley Mock MD   Multiple Vitamin (MULTI VITAMIN MENS PO) Take 1 tablet by mouth daily    Historical Provider, MD   ESOMEPRAZOLE MAGNESIUM PO Take 20 mg by mouth daily OTC    Historical Provider, MD   [place]-3 1000 MG CAPS Take 2,000 mg by mouth daily    Historical Provider, MD       Allergies: Allergies   Allergen Reactions    Lisinopril Swelling     Tongue swelling      Zetia [Ezetimibe] Swelling     Facial swelling    Atorvastatin     Codeine Other (See Comments)     Blood pressure drops    Hydrochlorothiazide     Hydroxyzine      Fatigue and depressed    Lexapro [Escitalopram Oxalate]      Increased depression    Pravastatin    . Social History:    TOBACCO:  No  ETOH:  No    Family History: reviewed and positives included in HPI- all other pertinent GI family history negative      REVIEW OF SYSTEMS: see HPI for positives and pertinent negatives. All other systems reviewed and are negative    PHYSICAL EXAM:    Vitals:  /67   Pulse 76   Temp 98 °F (36.7 °C) (Oral)   Resp 21   Ht 5' 8\" (1.727 m)   Wt 226 lb 11.2 oz (102.8 kg)   SpO2 96%   BMI 34.47 kg/m²   CONSTITUTIONAL: alert, cooperative, no apparent distress,   EYES:  pupils equal, round and reactive to light and sclera clear  ENT:  normocepalic, without obvious abnormality  NECK:  supple, symmetrical, trachea midline  HEMATOLOGIC/LYMPHATICS:  no cervical lymphadenopathy and no supraclavicular lymphadenopathy  LUNGS:  clear to auscultation  CARDIOVASCULAR:  regular rate and rhythm and no murmur noted  ABDOMEN:  Soft, non-tender with normal bowel sounds.  No organomegaly or masses  NEUROLOGIC: no asterixis but unable to copy a star diagram  SKIN:  no lesions  EXTREMITIES: no clubbing, cyanosis, or edema    IMPRESSION:  1) cirrhosis- due to prior steatosis (alcohol and metabolic)- complicated by esoph varices (have never bled), ascites, hepatic hydrothorax (?chylous), PSE-- MELD 13  2) stage 1-2 PSE despite normal ammonia level- only precipitating/aggravating factors that I can find are elevated BUN and perhaps increased sedation with Cymbalta  3) refractory ascites with hepatic hydrothorax-- reasonably managed recently with diuretics, paracentesis every 2 weeks and thoracentesis prn       RECOMMENDATIONS:  1) paracentesis today with albumin infusion to rule out SBP   2) resume Xifaxan 550 mg BID (has been taking that according to wife)  3) his is due for AFP for [facility] surveillance  4) discussed with wife and advised to follow up regularly with hepatology at [facility] ( though currently his MELD is too low for transplant and he is not a good TIPS candidate-so doubt Rx will be altered)        Danni Busch M.D

## 2023-01-05 NOTE — RT PROTOCOL NOTE
RT Inhaler-Nebulizer Bronchodilator Protocol Note    There is a bronchodilator order in the chart from a provider indicating to follow the RT Bronchodilator Protocol and there is an Initiate RT Inhaler-Nebulizer Bronchodilator Protocol order as well (see protocol at bottom of note). CXR Findings:  XR CHEST PORTABLE    Result Date: 1/4/2023  There appears to be a persistent right pleural fluid and parenchymal opacification the right lung suggesting atelectasis       The findings from the last RT Protocol Assessment were as follows:   History Pulmonary Disease: None or smoker <15 pack years  Respiratory Pattern: Regular pattern and RR 12-20 bpm  Breath Sounds: Clear breath sounds  Cough: Strong, spontaneous, non-productive  Indication for Bronchodilator Therapy:    Bronchodilator Assessment Score: 0    Aerosolized bronchodilator medication orders have been revised according to the RT Inhaler-Nebulizer Bronchodilator Protocol below. Respiratory Therapist to perform RT Therapy Protocol Assessment initially then follow the protocol. Repeat RT Therapy Protocol Assessment PRN for score 0-3 or on second treatment, BID, and PRN for scores above 3. No Indications - adjust the frequency to every 6 hours PRN wheezing or bronchospasm, if no treatments needed after 48 hours then discontinue using Per Protocol order mode. If indication present, adjust the RT bronchodilator orders based on the Bronchodilator Assessment Score as indicated below. Use Inhaler orders unless patient has one or more of the following: on home nebulizer, not able to hold breath for 10 seconds, is not alert and oriented, cannot activate and use MDI correctly, or respiratory rate 25 breaths per minute or more, then use the equivalent nebulizer order(s) with same Frequency and PRN reasons based on the score. If a patient is on this medication at home then do not decrease Frequency below that used at home.     0-3 - enter or revise RT bronchodilator order(s) to equivalent RT Bronchodilator order with Frequency of every 4 hours PRN for wheezing or increased work of breathing using Per Protocol order mode. 4-6 - enter or revise RT Bronchodilator order(s) to two equivalent RT bronchodilator orders with one order with BID Frequency and one order with Frequency of every 4 hours PRN wheezing or increased work of breathing using Per Protocol order mode. 7-10 - enter or revise RT Bronchodilator order(s) to two equivalent RT bronchodilator orders with one order with TID Frequency and one order with Frequency of every 4 hours PRN wheezing or increased work of breathing using Per Protocol order mode. 11-13 - enter or revise RT Bronchodilator order(s) to one equivalent RT bronchodilator order with QID Frequency and an Albuterol order with Frequency of every 4 hours PRN wheezing or increased work of breathing using Per Protocol order mode. Greater than 13 - enter or revise RT Bronchodilator order(s) to one equivalent RT bronchodilator order with every 4 hours Frequency and an Albuterol order with Frequency of every 2 hours PRN wheezing or increased work of breathing using Per Protocol order mode. RT to enter RT Home Evaluation for COPD & MDI Assessment order using Per Protocol order mode.     Electronically signed by Abbi Johnson RCP on 1/5/2023 at 9:23 AM

## 2023-01-05 NOTE — PROGRESS NOTES
4 Eyes Skin Assessment     NAME:  Neto Cummings OF BIRTH:  1945  MEDICAL RECORD NUMBER:  [id number]    The patient is being assessed for  Admission    I agree that One RN have performed a thorough Head to Toe Skin Assessment on the patient. ALL assessment sites listed below have been assessed. Areas assessed by both nurses:    Head, Face, Ears, Shoulders, Back, Chest, Arms, Elbows, Hands, Sacrum. Buttock, Coccyx, Ischium, and Legs. Feet and Heels        Does the Patient have a Wound?  No noted wound(s)       Andre Prevention initiated by RN: Yes   Wound Care Orders initiated by RN: NA    Pressure Injury (Stage 3,4, Unstageable, DTI, NWPT, and Complex wounds) if present place referral order by RN under : NA    New and Established Ostomies, if present place, referral order under : NA      Nurse 1 eSignature: Electronically signed by Amita Loya RN on 1/5/23 at 12:43 AM EST    **SHARE this note so that the co-signing nurse is able to place an eSignature**    Nurse 2 eSignature: Electronically signed by Jeniffer Edgar RN on 1/5/23 at 1:15 AM EST

## 2023-01-06 ENCOUNTER — APPOINTMENT (OUTPATIENT)
Dept: INTERVENTIONAL RADIOLOGY/VASCULAR | Age: 78
DRG: 442 | End: 2023-01-06
Payer: MEDICARE

## 2023-01-06 ENCOUNTER — APPOINTMENT (OUTPATIENT)
Dept: GENERAL RADIOLOGY | Age: 78
DRG: 442 | End: 2023-01-06
Payer: MEDICARE

## 2023-01-06 LAB
ANION GAP SERPL CALCULATED.3IONS-SCNC: 9 MMOL/L (ref 4–16)
BUN BLDV-MCNC: 24 MG/DL (ref 6–23)
CALCIUM SERPL-MCNC: 9.6 MG/DL (ref 8.3–10.6)
CHLORIDE BLD-SCNC: 99 MMOL/L (ref 99–110)
CO2: 28 MMOL/L (ref 21–32)
CREAT SERPL-MCNC: 1.2 MG/DL (ref 0.9–1.3)
GFR SERPL CREATININE-BSD FRML MDRD: >60 ML/MIN/1.73M2
GLUCOSE BLD-MCNC: 111 MG/DL (ref 70–99)
POTASSIUM SERPL-SCNC: 4.1 MMOL/L (ref 3.5–5.1)
SODIUM BLD-SCNC: 136 MMOL/L (ref 135–145)

## 2023-01-06 PROCEDURE — 32555 ASPIRATE PLEURA W/ IMAGING: CPT

## 2023-01-06 PROCEDURE — 71045 X-RAY EXAM CHEST 1 VIEW: CPT

## 2023-01-06 PROCEDURE — 87040 BLOOD CULTURE FOR BACTERIA: CPT

## 2023-01-06 PROCEDURE — 1200000000 HC SEMI PRIVATE

## 2023-01-06 PROCEDURE — 99231 SBSQ HOSP IP/OBS SF/LOW 25: CPT | Performed by: SPECIALIST

## 2023-01-06 PROCEDURE — 6360000002 HC RX W HCPCS: Performed by: NURSE PRACTITIONER

## 2023-01-06 PROCEDURE — 6370000000 HC RX 637 (ALT 250 FOR IP): Performed by: SPECIALIST

## 2023-01-06 PROCEDURE — 36415 COLL VENOUS BLD VENIPUNCTURE: CPT

## 2023-01-06 PROCEDURE — 0W993ZZ DRAINAGE OF RIGHT PLEURAL CAVITY, PERCUTANEOUS APPROACH: ICD-10-PCS | Performed by: STUDENT IN AN ORGANIZED HEALTH CARE EDUCATION/TRAINING PROGRAM

## 2023-01-06 PROCEDURE — 94640 AIRWAY INHALATION TREATMENT: CPT

## 2023-01-06 PROCEDURE — 2580000003 HC RX 258: Performed by: STUDENT IN AN ORGANIZED HEALTH CARE EDUCATION/TRAINING PROGRAM

## 2023-01-06 PROCEDURE — 6370000000 HC RX 637 (ALT 250 FOR IP): Performed by: NURSE PRACTITIONER

## 2023-01-06 PROCEDURE — 2709999900 IR GUIDED THORACENTESIS PLEURAL

## 2023-01-06 PROCEDURE — 6360000002 HC RX W HCPCS: Performed by: STUDENT IN AN ORGANIZED HEALTH CARE EDUCATION/TRAINING PROGRAM

## 2023-01-06 PROCEDURE — 80048 BASIC METABOLIC PNL TOTAL CA: CPT

## 2023-01-06 PROCEDURE — 94761 N-INVAS EAR/PLS OXIMETRY MLT: CPT

## 2023-01-06 PROCEDURE — 2580000003 HC RX 258: Performed by: NURSE PRACTITIONER

## 2023-01-06 RX ORDER — ALBUTEROL SULFATE 90 UG/1
2 AEROSOL, METERED RESPIRATORY (INHALATION)
Status: DISCONTINUED | OUTPATIENT
Start: 2023-01-07 | End: 2023-01-08

## 2023-01-06 RX ADMIN — PANTOPRAZOLE SODIUM 20 MG: 20 TABLET, DELAYED RELEASE ORAL at 06:17

## 2023-01-06 RX ADMIN — SPIRONOLACTONE 100 MG: 50 TABLET ORAL at 09:55

## 2023-01-06 RX ADMIN — SPIRONOLACTONE 100 MG: 50 TABLET ORAL at 20:42

## 2023-01-06 RX ADMIN — DULOXETINE HYDROCHLORIDE 60 MG: 30 CAPSULE, DELAYED RELEASE ORAL at 09:55

## 2023-01-06 RX ADMIN — ALBUTEROL SULFATE 2 PUFF: 90 AEROSOL, METERED RESPIRATORY (INHALATION) at 00:33

## 2023-01-06 RX ADMIN — VANCOMYCIN HYDROCHLORIDE 1750 MG: 5 INJECTION, POWDER, LYOPHILIZED, FOR SOLUTION INTRAVENOUS at 17:38

## 2023-01-06 RX ADMIN — SODIUM CHLORIDE, PRESERVATIVE FREE 10 ML: 5 INJECTION INTRAVENOUS at 09:55

## 2023-01-06 RX ADMIN — SODIUM CHLORIDE, PRESERVATIVE FREE 10 ML: 5 INJECTION INTRAVENOUS at 20:43

## 2023-01-06 RX ADMIN — THIAMINE HYDROCHLORIDE 500 MG: 100 INJECTION, SOLUTION INTRAMUSCULAR; INTRAVENOUS at 06:18

## 2023-01-06 RX ADMIN — ALBUTEROL SULFATE 2 PUFF: 90 AEROSOL, METERED RESPIRATORY (INHALATION) at 20:31

## 2023-01-06 RX ADMIN — OMEGA-3-ACID ETHYL ESTERS 2000 MG: 1 CAPSULE, LIQUID FILLED ORAL at 09:55

## 2023-01-06 RX ADMIN — THIAMINE HYDROCHLORIDE 250 MG: 100 INJECTION, SOLUTION INTRAMUSCULAR; INTRAVENOUS at 22:33

## 2023-01-06 RX ADMIN — RIFAXIMIN 550 MG: 550 TABLET ORAL at 09:55

## 2023-01-06 RX ADMIN — LACTULOSE 20 G: 20 SOLUTION ORAL at 20:42

## 2023-01-06 RX ADMIN — RIFAXIMIN 550 MG: 550 TABLET ORAL at 20:42

## 2023-01-06 RX ADMIN — LACTULOSE 20 G: 20 SOLUTION ORAL at 09:55

## 2023-01-06 RX ADMIN — FUROSEMIDE 40 MG: 40 TABLET ORAL at 09:55

## 2023-01-06 RX ADMIN — LEVOTHYROXINE SODIUM 50 MCG: 0.05 TABLET ORAL at 06:17

## 2023-01-06 RX ADMIN — LACTULOSE 20 G: 20 SOLUTION ORAL at 14:32

## 2023-01-06 RX ADMIN — NADOLOL 40 MG: 20 TABLET ORAL at 09:59

## 2023-01-06 ASSESSMENT — PAIN SCALES - GENERAL
PAINLEVEL_OUTOF10: 0

## 2023-01-06 NOTE — PROGRESS NOTES
Attempted to begin IV infusions after pt returning from IR for thora though lab was at bedside collecting blood cultures. Pt and wife report lab was only able to collect one set of cultures as she was unable to get sample from pt R arm. Pt reports he will be leaving tonight AMA after his first dose of vancomycin IV. Explained to risks including significantly worse infection systemically or localized and could escalate up to death. Pt and his wife both verbalized understanding of risk. PS to Dr. Brennan Comp to make him aware of pt plan for AMA following first dose of vanco IV.

## 2023-01-06 NOTE — PROGRESS NOTES
Pt just alerted this nurse he has to leave today due to a personal issue that has come up at home. Explained to pt the doctor has ordered blood cultures and IV atbx and he does not feel this is needed though he DOES want to have the thoracentesis prior to leaving the hospital. PS to Dr. Beaver who states he will come and speak with pt.

## 2023-01-06 NOTE — PROGRESS NOTES
Had 4600 cc paracentesis yesterday- fluid not suspicious for SBP and consistent with portal hypertension  Abdomen soft, non-tender, non-distended  Mentation still a bit slow  For thoracentesis today  Prob can discharge soon

## 2023-01-06 NOTE — PLAN OF CARE
Problem: Discharge Planning  Goal: Discharge to home or other facility with appropriate resources  1/6/2023 0040 by Geoffrey Peacock RN  Outcome: Progressing  1/5/2023 1559 by Tresa Roberts RN  Outcome: Progressing     Problem: Chronic Conditions and Co-morbidities  Goal: Patient's chronic conditions and co-morbidity symptoms are monitored and maintained or improved  1/6/2023 0040 by Geoffrey Peacock RN  Outcome: Progressing  1/5/2023 1559 by Tresa Roberts RN  Outcome: Progressing     Problem: Safety - Adult  Goal: Free from fall injury  1/6/2023 0040 by Geoffrey Peacock RN  Outcome: Progressing  1/5/2023 1559 by Tresa Roberts RN  Outcome: Progressing

## 2023-01-06 NOTE — CARE COORDINATION
CM in to see Pt to initiate discharge planning. Pt admitted with altered mental status. CM call to Pt wife Robe Cool for discharge planning. Pt from home with his wife. Pt has DME to include cane and walker. Robe Silvina states Pt typically does not use them unless he really needs assistance. Pt has home O2 through [employer]. Pt has had home care through [facility] in the past.  Robe Cool denies the need at this time. Pt has insurance, pcp, and can afford medications. Robe Cool denies any needs.      CM following

## 2023-01-06 NOTE — OR NURSING
PROCEDURE PERFORMED: thoracentesis     PRIMARY INDICATION FOR PROCEDURE: pleural effusion     INFORMED CONSENT:  Obtained prior to procedure. Consent placed in chart. PT TRANSPORTED FROM:  3024              TO THE IR ROOM:    Small room                     ARRIVED TO ROOM: 1540    ASSESSMENT: A&Ox4 with VSS    STAFF PRESENT: Mei Ruiz RN, Eryn Elizabeth RN, Janelle Mcmullen RN     BARRIER PRECAUTIONS & STERILE TECHNIQUE:               Pt transferred to the table and positioned sitting up on side of bed for comfort. Warm blankets given. Pt placed on Cardiac Monitor. Pt prepped and draped in a sterile fashion with chlorhexadine. PAIN/LOCAL ANESTHESIA/SEDATION MANAGEMENT:           Local: Lidocaine 1% given by  @[id number]              INTRAOPERATIVE:           ACCESS TIME: 1552          US/FLUORO: 2 images                          CATHETER USED: one step            STERILE DRESSINGS:  Gauze and tegaderm     SPECIMEN: 2000 cc creamy pink tinged fluid     EBL:      >1cc    FOLLOW- UP X-RAY: ordered to complete STAT at bedside       COMPLICATIONS/ OUTCOME: Patient tolerated procedure without issues.  VSS           LEFT THE ROOM: 3187    REPORT CALLED TO: Akilah Kruse [id number]

## 2023-01-06 NOTE — PROGRESS NOTES
V2.0  [facility]ist Progress Note      Name:  Francy Cedeño /Age/Sex: 1945  (78 y.o. male)   MRN & CSN:  [id number] & [id number] Encounter Date/Time: 2023 2:51 PM EST    Location:  [address] [place] [address] PCP: Gricel Whittington MD       Hospital Day: 3    Assessment and Plan:   Francy Cedeño is a 78 y.o. male  who presents with AMS (altered mental status)    1. Hepatic encephalopathy  -Patient has history of alcoholic cirrhosis  -Status post paracentesis  -Resume Xifaxan 550 mg twice daily  -Head CT negative for any acute intracranial process  -GI consulted and following    2. Hepatic cirrhosis  -Secondary to alcoholic liver disease  -History of varices but no bleed  -Follows up with GI  -Recommended to follow-up with hepatology at [address] [place] [address] and deemed not a candidate for TIPS  -Resume home medications nadolol/spironolactone/Lasix    3. Ascites  -Status post paracentesis  -History of multiple paracentesis  -Ascitic fluid WBC  238  -Continue to follow  -Continue diuretics    4. Right hydrothorax  -IR consulted for right thoracentesis    5. Hypertension  -Resume home medications    6. Hypothyroidism  -Resume Synthroid    7. Bacteremia?  -Blood cultures bottle 1 4 out of 4 positive for staph epi. Second bottle 3 out of 4 positive  -Repeat cultures ordered              Diet ADULT DIET; Regular; Low Sodium (2 gm)   DVT Prophylaxis [] Lovenox, []  Heparin, [] SCDs, [] Ambulation,  [] Eliquis, [] Xarelto  [] Coumadin   Code Status Full Code   Disposition From: Home  Expected Disposition: Home  Estimated Date of Discharge: 2023  Patient requires continued admission due to hepatic encephalopathy/bacteremia   Surrogate Decision Maker/ POA      Subjective:     Chief Complaint: Altered Mental Status (Arrived per EMS from home, altered since last night per wife. Hx of AMS w/ elevated ammonia- some right sided weakness, drowsy during triage)     Patient seen and examined at bedside this morning.   No acute overnight events reported. States that he is a bit confused but denies any chest pain, shortness of breath, nausea vomiting fever or chills. Patient requested to be discharged home later today however was informed that due to positive blood cultures and pending repeat cultures discharge is not possible at this time. Patient stated that he would sign out AMA if he cannot leave today. Objective: Intake/Output Summary (Last 24 hours) at 1/6/2023 1212  Last data filed at 1/5/2023 2230  Gross per 24 hour   Intake 120 ml   Output 300 ml   Net -180 ml        Vitals:   Vitals:    01/06/23 0959   BP:    Pulse: 72   Resp:    Temp:    SpO2:        Physical Exam:     General: NAD  Eyes: EOMI  ENT: neck supple  Cardiovascular: Regular rate. Respiratory: Decreased breath sounds on the right  Gastrointestinal: Soft, non tender  Genitourinary: no suprapubic tenderness  Musculoskeletal: No edema  Skin: warm, dry  Neuro: Alert. Psych: Mood appropriate.      Medications:   Medications:    pantoprazole  20 mg Oral QAM AC    thiamine (VITAMIN B1) IVPB  500 mg IntraVENous Q8H    Followed by    thiamine (VITAMIN B1) IVPB  250 mg IntraVENous Q24H    Followed by    Lindsey Mayes ON 1/11/2023] thiamine  100 mg Oral Daily    rifAXIMin  550 mg Oral BID    sodium chloride flush  5-40 mL IntraVENous 2 times per day    DULoxetine  60 mg Oral Daily    furosemide  40 mg Oral Daily    lactulose  20 g Oral TID    levothyroxine  50 mcg Oral Daily    nadolol  40 mg Oral Daily    spironolactone  100 mg Oral BID    omega-3 acid ethyl esters  2,000 mg Oral Daily      Infusions:    sodium chloride       PRN Meds: albuterol sulfate HFA, 2 puff, Q4H PRN  sodium chloride flush, 5-40 mL, PRN  sodium chloride, , PRN  ondansetron, 4 mg, Q8H PRN   Or  ondansetron, 4 mg, Q6H PRN      Labs      Recent Results (from the past 24 hour(s))   Basic Metabolic Panel    Collection Time: 01/06/23  2:10 AM   Result Value Ref Range    Sodium 136 135 - 145 MMOL/L Potassium 4.1 3.5 - 5.1 MMOL/L    Chloride 99 99 - 110 mMol/L    CO2 28 21 - 32 MMOL/L    Anion Gap 9 4 - 16    BUN 24 (H) 6 - 23 MG/DL    Creatinine 1.2 0.9 - 1.3 MG/DL    Est, Glom Filt Rate >60 >60 mL/min/1.73m2    Glucose 111 (H) 70 - 99 MG/DL    Calcium 9.6 8.3 - 10.6 MG/DL        Imaging/Diagnostics Last 24 Hours   CT HEAD WO CONTRAST    Result Date: 1/4/2023  EXAMINATION: CT OF THE HEAD WITHOUT CONTRAST  1/4/2023 12:44 pm TECHNIQUE: CT of the head was performed without the administration of intravenous contrast. Automated exposure control, iterative reconstruction, and/or weight based adjustment of the mA/kV was utilized to reduce the radiation dose to as low as reasonably achievable. COMPARISON: 11/12/2022, multiple prior head CTs dating back to 02/11/2022 HISTORY: ORDERING SYSTEM PROVIDED HISTORY: ams TECHNOLOGIST PROVIDED HISTORY: Reason for exam:->ams Has a \"code stroke\" or \"stroke alert\" been called? ->No Decision Support Exception - unselect if not a suspected or confirmed emergency medical condition->Emergency Medical Condition (MA) Reason for Exam: ams FINDINGS: BRAIN/VENTRICLES: There is no acute intracranial hemorrhage, mass effect or midline shift. No abnormal extra-axial fluid collection. The gray-white differentiation is maintained without evidence of an acute infarct. There is no evidence of hydrocephalus. Mild cortical atrophy, white matter changes consistent with microvascular degenerative disease, atherosclerotic calcification in the distal internal carotid arteries bilaterally and distal right vertebral artery uncertain hemodynamic significance. ORBITS: The visualized portion of the orbits demonstrate no acute abnormality. SINUSES: The visualized paranasal sinuses and mastoid air cells demonstrate no acute abnormality. SOFT TISSUES/SKULL:  No acute abnormality of the visualized skull or soft tissues. No noncontrast CT evidence of acute intracranial pathology.   No CT etiology for patient's clinical complaint of altered mental status noted on this study. Cortical atrophy, white matter changes consistent with microvascular degenerative disease, atherosclerotic calcification of the distal internal carotid and right vertebral arteries of uncertain hemodynamic significance     XR CHEST PORTABLE    Result Date: 1/4/2023  EXAMINATION: ONE XRAY VIEW OF THE CHEST 1/4/2023 12:42 pm COMPARISON: 12/09/2022 HISTORY: ORDERING SYSTEM PROVIDED HISTORY: Chest Pain TECHNOLOGIST PROVIDED HISTORY: Reason for exam:->Chest Pain Reason for Exam: chest pain FINDINGS: Heart size stable. Left lung clear. Similar appearing pleural and parenchymal opacities throughout the right hemithorax. No pneumothorax. There appears to be a persistent right pleural fluid and parenchymal opacification the right lung suggesting atelectasis     IR US GUIDED PARACENTESIS    Result Date: 1/5/2023  PROCEDURE: PARACENTESIS WITHOUT IMAGE GUIDANCE US ABDOMEN LIMITED 1/5/2023 HISTORY: ORDERING SYSTEM PROVIDED HISTORY: us- guided paracentesis TECHNOLOGIST PROVIDED HISTORY: Remove as much fluid as possible Please send fluid for: cell count and diff, culture and sensitivity, total protein, albumin Please give 6-8 grams albumin IV for each liter fluid removed Please innoculate fluid immediately into aerobic and non-aerobic blood culture bottles for the culture specimen Reason for exam:->us- guided paracentesis TECHNIQUE: Maximum sterile barrier technique including hand hygiene, skin prep and sterile ultrasound technique utilized for procedure. Sterile ultrasound technique also utilized for procedure Ultrasound guidance required for procedure to confirm presence of ascites, puncture site selection, real-time intra procedural guidance. Images made for patient's medical file. Informed consent was obtained after a detailed explanation of the procedure including risks, benefits, and alternatives. Universal protocol was followed.   A limited ultrasound of the abdomen was performed. The right abdomen was prepped and draped in sterile fashion and local anesthesia was achieved with lidocaine. An 6 [place] needle sheath was advanced into ascites and paracentesis was performed. The patient tolerated the procedure well. FINDINGS: Limited ultrasound of the abdomen demonstrates ascites with the paracentesis catheter within it. A total of 4620 mL cloudy white chylous appearing fluid was removed. 1020 mL sample sent for laboratory evaluation. 25 g intravenous albumin utilized for procedure. No complication suggested. Successful ultrasound-guided paracentesis with specimen sent for laboratory evaluation. CTA HEAD NECK W CONTRAST    Result Date: 1/4/2023  EXAMINATION: CTA OF THE HEAD AND NECK WITH CONTRAST 1/4/2023 5:09 pm: TECHNIQUE: CTA of the head and neck was performed with the administration of intravenous contrast. Multiplanar reformatted images are provided for review. MIP images are provided for review. Stenosis of the internal carotid arteries measured using NASCET criteria. Automated exposure control, iterative reconstruction, and/or weight based adjustment of the mA/kV was utilized to reduce the radiation dose to as low as reasonably achievable. COMPARISON: None. HISTORY: ORDERING SYSTEM PROVIDED HISTORY: ams TECHNOLOGIST PROVIDED HISTORY: Reason for exam:->ams Has a \"code stroke\" or \"stroke alert\" been called? ->No Decision Support Exception - unselect if not a suspected or confirmed emergency medical condition->Emergency Medical Condition (MA) Reason for Exam: ams Additional signs and symptoms: no Relevant Medical/Surgical History: 80 ml isovue 370 used FINDINGS: CTA NECK: AORTIC ARCH/ARCH VESSELS: No dissection or arterial injury. No significant stenosis of the brachiocephalic or subclavian arteries. CAROTID ARTERIES: No dissection, arterial injury, or hemodynamically significant stenosis by NASCET criteria.  VERTEBRAL ARTERIES: No dissection, arterial injury, or significant stenosis. SOFT TISSUES: There is a large right pleural effusion with a partially visualized mass within the right upper lobe measuring at least 6.6 x 5.8 cm in diameter. Mild mediastinal adenopathy is also noted. BONES: No acute osseous abnormality. CTA HEAD: ANTERIOR CIRCULATION: The anterior and middle cerebral arteries demonstrate normal arborization patterns. There is asymmetric narrowing and attenuation of the left MCA and branch vessels. No aneurysm. POSTERIOR CIRCULATION: No significant stenosis of the vertebral, basilar, or posterior cerebral arteries. No aneurysm. OTHER: No dural venous sinus thrombosis on this non-dedicated study. BRAIN: See separately dictated noncontrast head CT report. No large vessel occlusion visualized within the head or neck. Asymmetric narrowing and attenuation of the left MCA and branch vessels. Recommend digital subtraction angiography for further evaluation. Incidentally noted pleural effusion and partially visualized right upper lobe mass. Please refer to the separately dictated CT chest report for further details and management recommendations. CT CHEST ABDOMEN PELVIS W CONTRAST Additional Contrast? None    Result Date: 1/4/2023  EXAMINATION: CT OF THE CHEST, ABDOMEN, AND PELVIS WITH CONTRAST 1/4/2023 5:11 pm TECHNIQUE: CT of the chest, abdomen and pelvis was performed with the administration of intravenous contrast. Multiplanar reformatted images are provided for review. Automated exposure control, iterative reconstruction, and/or weight based adjustment of the mA/kV was utilized to reduce the radiation dose to as low as reasonably achievable. COMPARISON: September 13, 2021 HISTORY: ORDERING SYSTEM PROVIDED HISTORY: hx of liver cirrhosis TECHNOLOGIST PROVIDED HISTORY: Reason for exam:->hx of liver cirrhosis Additional Contrast?->None Reason for Exam: hx of liver cirrhosis.  sob Additional signs and symptoms: no Relevant Medical/Surgical History: 80 ml isovue 370 used FINDINGS: Chest: Mediastinum: Thoracic aorta is nonaneurysmal.  No evidence of dissection. Heart and pericardium appear unremarkable. Scattered mediastinal nodes but no evidence of significant lymphadenopathy. Largest paratracheal node measures up to 8.1 mm. Main pulmonary artery is enlarged measuring 3.2 cm but no evidence of acute pulmonary embolism. Lungs/pleura: A large right pleural effusion is noted. 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 Atelectatic changes seen in the right upper and right lower lobes with collapse of the entire right lower lobe. Left lung demonstrates no active infiltrates. Minimal atelectatic changes. Soft Tissues/Bones: No acute abnormality. Abdomen/Pelvis: Organs: The liver demonstrates severe cirrhosis with nodularity and shrinkage. No focal masses. Gallbladder is not seen and presumably removed. Pancreas and spleen appear unremarkable. No evidence of splenomegaly at this time. Adrenals, kidneys, aorta and IVC appear stable. Aorta is calcified but nonaneurysmal. GI/Bowel: No evidence of bowel obstruction or perforation. Mild constipation and stool impaction in the rectum. Small bowel appears unremarkable. No evidence of significant varices. Pelvis: Urinary bladder, prostate and seminal vesicles appear stable. Scattered bilateral pelvic nodes which are not significantly enlarged. Left inguinal hernia containing ascitic fluid. Peritoneum/Retroperitoneum: Moderate ascites and mesenteric congestion. Retroperitoneal lymphadenopathy with nodes measuring up to 1.4 cm in the aortocaval region. This was previously seen as well. Bones/Soft Tissues: No acute abnormality. 1. Large right pleural effusion and atelectatic changes in the right upper and right lower lobe with significantly compromised aeration in the right lung. 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 Minimal atelectatic changes left lower lobe.  2. Prominent main pulmonary artery measuring 3.2 cm but no acute pulmonary embolism. Aorta is nonaneurysmal and there is no evidence of dissection. 3. Moderate ascites in the abdomen and pelvis. Mesenteric congestion. 4. Hepatic cirrhosis with nodularity and some shrinkage but no focal abnormality. 5. Redemonstration of retroperitoneal lymphadenopathy. 6. Left inguinal hernia containing ascitic fluid.        Electronically signed by Rosa Elena Azar MD on 1/6/2023 at 12:12 PM

## 2023-01-06 NOTE — CONSULTS
[facility]  consulted by Dr. Meera Carbone for monitoring and adjustment. Indication for treatment: Vancomycin indication: Bacteremia  Goal trough: Trough Goal: 15-20 mcg/mL  AUC/TRES: 400-600    Risk Factors for MRSA Identified:   Hospitalization within the past 90 days    Pertinent Laboratory Values:   Temp Readings from Last 3 Encounters:   01/06/23 97.9 °F (36.6 °C) (Oral)   12/28/22 98.1 °F (36.7 °C) (Temporal)   12/14/22 97.8 °F (36.6 °C) (Temporal)     Recent Labs     01/04/23  1207 01/04/23  1900   WBC  --  7.5   LACTATE 1.1  --      Recent Labs     01/04/23  1207 01/04/23  2335 01/06/23  0210   BUN 31* 28* 24*   CREATININE 1.5* 1.2 1.2     Estimated Creatinine Clearance: 60 mL/min (based on SCr of 1.2 mg/dL). Intake/Output Summary (Last 24 hours) at 1/6/2023 1259  Last data filed at 1/5/2023 2230  Gross per 24 hour   Intake 120 ml   Output 300 ml   Net -180 ml       Pertinent Cultures:   Date    Source    Results  1/4   Blood (4 of 4)   GPC  1/5   Body fluid   No growth  1/6   Blood                                      Ordered    Vancomycin level:   TROUGH:  No results for input(s): VANCOTROUGH in the last 72 hours. RANDOM:  No results for input(s): VANCORANDOM in the last 72 hours. Assessment:  HPI: Nithin Hanson is a 78 y.o. male  who presents with AMS (altered mental status). Hepatic encephalopathy, patient has history of alcoholic cirrhosis. Blood culture  4 of 4 with GPC. SCr, BUN, and urine output: SCR appears just above baseline @1.2 (naseline 1.0-1.1), limited UOP data. Day(s) of therapy: 1 (duration TBD)  Vancomycin concentration:   1/8 - random @06:00    Plan:  Give vancomycin 1750mg ivpb x1 dose today, then start vancomycin 1500mg ivpb q24h for a predicted AUC of 482 at steady state. Check the vanco level in 2 days.   Pharmacy will continue to monitor patient and adjust therapy as indicated    VANCOMYCIN CONCENTRATION SCHEDULED FOR 1/8 @06:00    Thank you for the consult. Kia Wang [facility], [id number] [facility]  1/6/2023 12:59 PM

## 2023-01-07 LAB
ANION GAP SERPL CALCULATED.3IONS-SCNC: 11 MMOL/L (ref 4–16)
BUN BLDV-MCNC: 23 MG/DL (ref 6–23)
CALCIUM SERPL-MCNC: 9.5 MG/DL (ref 8.3–10.6)
CHLORIDE BLD-SCNC: 100 MMOL/L (ref 99–110)
CO2: 24 MMOL/L (ref 21–32)
CREAT SERPL-MCNC: 1.2 MG/DL (ref 0.9–1.3)
CULTURE: ABNORMAL
GFR SERPL CREATININE-BSD FRML MDRD: >60 ML/MIN/1.73M2
GLUCOSE BLD-MCNC: 114 MG/DL (ref 70–99)
Lab: ABNORMAL
Lab: ABNORMAL
POTASSIUM SERPL-SCNC: 4.4 MMOL/L (ref 3.5–5.1)
SODIUM BLD-SCNC: 135 MMOL/L (ref 135–145)
SPECIMEN: ABNORMAL
SPECIMEN: ABNORMAL

## 2023-01-07 PROCEDURE — 1200000000 HC SEMI PRIVATE

## 2023-01-07 PROCEDURE — 2580000003 HC RX 258: Performed by: STUDENT IN AN ORGANIZED HEALTH CARE EDUCATION/TRAINING PROGRAM

## 2023-01-07 PROCEDURE — 2580000003 HC RX 258: Performed by: NURSE PRACTITIONER

## 2023-01-07 PROCEDURE — 36415 COLL VENOUS BLD VENIPUNCTURE: CPT

## 2023-01-07 PROCEDURE — 6370000000 HC RX 637 (ALT 250 FOR IP): Performed by: SPECIALIST

## 2023-01-07 PROCEDURE — 6360000002 HC RX W HCPCS: Performed by: NURSE PRACTITIONER

## 2023-01-07 PROCEDURE — 94761 N-INVAS EAR/PLS OXIMETRY MLT: CPT

## 2023-01-07 PROCEDURE — 6370000000 HC RX 637 (ALT 250 FOR IP): Performed by: NURSE PRACTITIONER

## 2023-01-07 PROCEDURE — 99232 SBSQ HOSP IP/OBS MODERATE 35: CPT | Performed by: SPECIALIST

## 2023-01-07 PROCEDURE — 80048 BASIC METABOLIC PNL TOTAL CA: CPT

## 2023-01-07 PROCEDURE — 6360000002 HC RX W HCPCS: Performed by: STUDENT IN AN ORGANIZED HEALTH CARE EDUCATION/TRAINING PROGRAM

## 2023-01-07 PROCEDURE — 94640 AIRWAY INHALATION TREATMENT: CPT

## 2023-01-07 RX ADMIN — NADOLOL 40 MG: 20 TABLET ORAL at 09:20

## 2023-01-07 RX ADMIN — ALBUTEROL SULFATE 2 PUFF: 90 AEROSOL, METERED RESPIRATORY (INHALATION) at 11:10

## 2023-01-07 RX ADMIN — ALBUTEROL SULFATE 2 PUFF: 90 AEROSOL, METERED RESPIRATORY (INHALATION) at 15:20

## 2023-01-07 RX ADMIN — SODIUM CHLORIDE, PRESERVATIVE FREE 10 ML: 5 INJECTION INTRAVENOUS at 09:21

## 2023-01-07 RX ADMIN — DULOXETINE HYDROCHLORIDE 60 MG: 30 CAPSULE, DELAYED RELEASE ORAL at 09:21

## 2023-01-07 RX ADMIN — VANCOMYCIN HYDROCHLORIDE 1500 MG: 5 INJECTION, POWDER, LYOPHILIZED, FOR SOLUTION INTRAVENOUS at 14:19

## 2023-01-07 RX ADMIN — RIFAXIMIN 550 MG: 550 TABLET ORAL at 21:12

## 2023-01-07 RX ADMIN — SPIRONOLACTONE 100 MG: 50 TABLET ORAL at 21:12

## 2023-01-07 RX ADMIN — PANTOPRAZOLE SODIUM 20 MG: 20 TABLET, DELAYED RELEASE ORAL at 05:28

## 2023-01-07 RX ADMIN — LACTULOSE 20 G: 20 SOLUTION ORAL at 21:12

## 2023-01-07 RX ADMIN — SPIRONOLACTONE 100 MG: 50 TABLET ORAL at 09:20

## 2023-01-07 RX ADMIN — LEVOTHYROXINE SODIUM 50 MCG: 0.05 TABLET ORAL at 05:28

## 2023-01-07 RX ADMIN — LACTULOSE 20 G: 20 SOLUTION ORAL at 14:18

## 2023-01-07 RX ADMIN — SODIUM CHLORIDE, PRESERVATIVE FREE 10 ML: 5 INJECTION INTRAVENOUS at 21:13

## 2023-01-07 RX ADMIN — THIAMINE HYDROCHLORIDE 250 MG: 100 INJECTION, SOLUTION INTRAMUSCULAR; INTRAVENOUS at 22:37

## 2023-01-07 RX ADMIN — RIFAXIMIN 550 MG: 550 TABLET ORAL at 09:21

## 2023-01-07 RX ADMIN — OMEGA-3-ACID ETHYL ESTERS 2000 MG: 1 CAPSULE, LIQUID FILLED ORAL at 09:21

## 2023-01-07 RX ADMIN — FUROSEMIDE 40 MG: 40 TABLET ORAL at 09:21

## 2023-01-07 RX ADMIN — LACTULOSE 20 G: 20 SOLUTION ORAL at 09:21

## 2023-01-07 ASSESSMENT — PAIN SCALES - GENERAL
PAINLEVEL_OUTOF10: 0

## 2023-01-07 NOTE — PROGRESS NOTES
V2.0  [facility] [place]      Name:  Richmond Majano /Age/Sex: 1945  (78 y.o. male)   MRN & CSN:  [id number] & [id number] Encounter Date/Time: 2023 2:51 PM EST    Location:  [address] [place] [address] PCP: Ned Vuong, [address] Day: 4    Assessment and Plan:   Richmond Majano is a 78 y.o. male  who presents with AMS (altered mental status)    1. Hepatic encephalopathy  -Patient has history of alcoholic cirrhosis  -Status post paracentesis  -Resume Xifaxan 550 mg twice daily  -Head CT negative for any acute intracranial process  -GI consulted and following    2. Hepatic cirrhosis  -Secondary to alcoholic liver disease  -History of varices but no bleed  -Follows up with GI  -Recommended to follow-up with hepatology at [facility] and deemed not a candidate for TIPS  -Resume home medications nadolol/spironolactone/Lasix    3. Ascites  -Status post paracentesis  -History of multiple paracentesis  -Ascitic fluid WBC  238  -Continue to follow  -Continue diuretics    4. Right hydrothorax  -Status post right thoracentesis with removal of 2000 cc of fluid    5. Hypertension  -Resume home medications    6. Hypothyroidism  -Resume Synthroid    7. Bacteremia?  -Blood cultures bottle 1 4 out of 4 positive for staph epi. Second bottle 3 out of 4 positive  -Repeat cultures ordered  -Continue vancomycin  -Cardiology consulted for EVA  -ID consulted              Diet ADULT DIET; Regular; Low Sodium (2 gm)   DVT Prophylaxis [] Lovenox, []  Heparin, [] SCDs, [] Ambulation,  [] Eliquis, [] Xarelto  [] Coumadin   Code Status Full Code   Disposition From: Home  Expected Disposition: Home  Estimated Date of Discharge: 2023  Patient requires continued admission due to hepatic encephalopathy/bacteremia   Surrogate Decision Maker/ POA      Subjective:     Chief Complaint: Altered Mental Status (Arrived per EMS from home, altered since last night per wife.   Hx of AMS w/ elevated ammonia- some right sided weakness, drowsy during triage)     Patient seen and examined at bedside this morning. Status post right thoracentesis with removal of 2000 cc of fluid. Denies chest pain, shortness of breath, nausea vomiting, fever or chills  Objective: Intake/Output Summary (Last 24 hours) at 1/7/2023 1335  Last data filed at 1/7/2023 1101  Gross per 24 hour   Intake 360 ml   Output 1175 ml   Net -815 ml        Vitals:   Vitals:    01/07/23 1111   BP:    Pulse: 79   Resp: 15   Temp:    SpO2: 91%       Physical Exam:     General: NAD  Eyes: EOMI  ENT: neck supple  Cardiovascular: Regular rate. Respiratory: CTABL  Gastrointestinal: Soft, non tender  Genitourinary: no suprapubic tenderness  Musculoskeletal: No edema  Skin: warm, dry  Neuro: Alert. Psych: Mood appropriate.      Medications:   Medications:    vancomycin  1,500 mg IntraVENous Q24H    albuterol sulfate HFA  2 puff Inhalation Q4H WA    pantoprazole  20 mg Oral QAM AC    thiamine (VITAMIN B1) IVPB  250 mg IntraVENous Q24H    Followed by    Eneida Green ON 1/11/2023] thiamine  100 mg Oral Daily    rifAXIMin  550 mg Oral BID    sodium chloride flush  5-40 mL IntraVENous 2 times per day    DULoxetine  60 mg Oral Daily    furosemide  40 mg Oral Daily    lactulose  20 g Oral TID    levothyroxine  50 mcg Oral Daily    nadolol  40 mg Oral Daily    spironolactone  100 mg Oral BID    omega-3 acid ethyl esters  2,000 mg Oral Daily      Infusions:    sodium chloride       PRN Meds: sodium chloride flush, 5-40 mL, PRN  sodium chloride, , PRN  ondansetron, 4 mg, Q8H PRN   Or  ondansetron, 4 mg, Q6H PRN      Labs      Recent Results (from the past 24 hour(s))   Basic Metabolic Panel    Collection Time: 01/07/23  6:12 AM   Result Value Ref Range    Sodium 135 135 - 145 MMOL/L    Potassium 4.4 3.5 - 5.1 MMOL/L    Chloride 100 99 - 110 mMol/L    CO2 24 21 - 32 MMOL/L    Anion Gap 11 4 - 16    BUN 23 6 - 23 MG/DL    Creatinine 1.2 0.9 - 1.3 MG/DL    Est, Glom Filt Rate >60 >60 mL/min/1.73m2    Glucose 114 (H) 70 - 99 MG/DL    Calcium 9.5 8.3 - 10.6 MG/DL        Imaging/Diagnostics Last 24 Hours   CT HEAD WO CONTRAST    Result Date: 1/4/2023  EXAMINATION: CT OF THE HEAD WITHOUT CONTRAST  1/4/2023 12:44 pm TECHNIQUE: CT of the head was performed without the administration of intravenous contrast. Automated exposure control, iterative reconstruction, and/or weight based adjustment of the mA/kV was utilized to reduce the radiation dose to as low as reasonably achievable. COMPARISON: 11/12/2022, multiple prior head CTs dating back to 02/11/2022 HISTORY: ORDERING SYSTEM PROVIDED HISTORY: ams TECHNOLOGIST PROVIDED HISTORY: Reason for exam:->ams Has a \"code stroke\" or \"stroke alert\" been called? ->No Decision Support Exception - unselect if not a suspected or confirmed emergency medical condition->Emergency Medical Condition (MA) Reason for Exam: ams FINDINGS: BRAIN/VENTRICLES: There is no acute intracranial hemorrhage, mass effect or midline shift. No abnormal extra-axial fluid collection. The gray-white differentiation is maintained without evidence of an acute infarct. There is no evidence of hydrocephalus. Mild cortical atrophy, white matter changes consistent with microvascular degenerative disease, atherosclerotic calcification in the distal internal carotid arteries bilaterally and distal right vertebral artery uncertain hemodynamic significance. ORBITS: The visualized portion of the orbits demonstrate no acute abnormality. SINUSES: The visualized paranasal sinuses and mastoid air cells demonstrate no acute abnormality. SOFT TISSUES/SKULL:  No acute abnormality of the visualized skull or soft tissues. No noncontrast CT evidence of acute intracranial pathology. No CT etiology for patient's clinical complaint of altered mental status noted on this study.  Cortical atrophy, white matter changes consistent with microvascular degenerative disease, atherosclerotic calcification of the distal internal carotid and right vertebral arteries of uncertain hemodynamic significance     XR CHEST PORTABLE    Result Date: 1/4/2023  EXAMINATION: ONE XRAY VIEW OF THE CHEST 1/4/2023 12:42 pm COMPARISON: 12/09/2022 HISTORY: ORDERING SYSTEM PROVIDED HISTORY: Chest Pain TECHNOLOGIST PROVIDED HISTORY: Reason for exam:->Chest Pain Reason for Exam: chest pain FINDINGS: Heart size stable. Left lung clear. Similar appearing pleural and parenchymal opacities throughout the right hemithorax. No pneumothorax. There appears to be a persistent right pleural fluid and parenchymal opacification the right lung suggesting atelectasis     IR US GUIDED PARACENTESIS    Result Date: 1/5/2023  PROCEDURE: PARACENTESIS WITHOUT IMAGE GUIDANCE US ABDOMEN LIMITED 1/5/2023 HISTORY: ORDERING SYSTEM PROVIDED HISTORY: us- guided paracentesis TECHNOLOGIST PROVIDED HISTORY: Remove as much fluid as possible Please send fluid for: cell count and diff, culture and sensitivity, total protein, albumin Please give 6-8 grams albumin IV for each liter fluid removed Please innoculate fluid immediately into aerobic and non-aerobic blood culture bottles for the culture specimen Reason for exam:->us- guided paracentesis TECHNIQUE: Maximum sterile barrier technique including hand hygiene, skin prep and sterile ultrasound technique utilized for procedure. Sterile ultrasound technique also utilized for procedure Ultrasound guidance required for procedure to confirm presence of ascites, puncture site selection, real-time intra procedural guidance. Images made for patient's medical file. Informed consent was obtained after a detailed explanation of the procedure including risks, benefits, and alternatives. Universal protocol was followed. A limited ultrasound of the abdomen was performed. The right abdomen was prepped and draped in sterile fashion and local anesthesia was achieved with lidocaine.   An 6 Irish needle sheath was advanced into ascites and paracentesis was performed. The patient tolerated the procedure well. FINDINGS: Limited ultrasound of the abdomen demonstrates ascites with the paracentesis catheter within it. A total of 4620 mL cloudy white chylous appearing fluid was removed. 1020 mL sample sent for laboratory evaluation. 25 g intravenous albumin utilized for procedure. No complication suggested. Successful ultrasound-guided paracentesis with specimen sent for laboratory evaluation. CTA HEAD NECK W CONTRAST    Result Date: 1/4/2023  EXAMINATION: CTA OF THE HEAD AND NECK WITH CONTRAST 1/4/2023 5:09 pm: TECHNIQUE: CTA of the head and neck was performed with the administration of intravenous contrast. Multiplanar reformatted images are provided for review. MIP images are provided for review. Stenosis of the internal carotid arteries measured using NASCET criteria. Automated exposure control, iterative reconstruction, and/or weight based adjustment of the mA/kV was utilized to reduce the radiation dose to as low as reasonably achievable. COMPARISON: None. HISTORY: ORDERING SYSTEM PROVIDED HISTORY: ams TECHNOLOGIST PROVIDED HISTORY: Reason for exam:->ams Has a \"code stroke\" or \"stroke alert\" been called? ->No Decision Support Exception - unselect if not a suspected or confirmed emergency medical condition->Emergency Medical Condition (MA) Reason for Exam: ams Additional signs and symptoms: no Relevant Medical/Surgical History: 80 ml isovue 370 used FINDINGS: CTA NECK: AORTIC ARCH/ARCH VESSELS: No dissection or arterial injury. No significant stenosis of the brachiocephalic or subclavian arteries. CAROTID ARTERIES: No dissection, arterial injury, or hemodynamically significant stenosis by NASCET criteria. VERTEBRAL ARTERIES: No dissection, arterial injury, or significant stenosis.  SOFT TISSUES: There is a large right pleural effusion with a partially visualized mass within the right upper lobe measuring at least 6.6 x 5.8 cm in diameter. Mild mediastinal adenopathy is also noted. BONES: No acute osseous abnormality. CTA HEAD: ANTERIOR CIRCULATION: The anterior and middle cerebral arteries demonstrate normal arborization patterns. There is asymmetric narrowing and attenuation of the left MCA and branch vessels. No aneurysm. POSTERIOR CIRCULATION: No significant stenosis of the vertebral, basilar, or posterior cerebral arteries. No aneurysm. OTHER: No dural venous sinus thrombosis on this non-dedicated study. BRAIN: See separately dictated noncontrast head CT report. No large vessel occlusion visualized within the head or neck. Asymmetric narrowing and attenuation of the left MCA and branch vessels. Recommend digital subtraction angiography for further evaluation. Incidentally noted pleural effusion and partially visualized right upper lobe mass. Please refer to the separately dictated CT chest report for further details and management recommendations. CT CHEST ABDOMEN PELVIS W CONTRAST Additional Contrast? None    Result Date: 1/4/2023  EXAMINATION: CT OF THE CHEST, ABDOMEN, AND PELVIS WITH CONTRAST 1/4/2023 5:11 pm TECHNIQUE: CT of the chest, abdomen and pelvis was performed with the administration of intravenous contrast. Multiplanar reformatted images are provided for review. Automated exposure control, iterative reconstruction, and/or weight based adjustment of the mA/kV was utilized to reduce the radiation dose to as low as reasonably achievable. COMPARISON: September 13, 2021 HISTORY: ORDERING SYSTEM PROVIDED HISTORY: hx of liver cirrhosis TECHNOLOGIST PROVIDED HISTORY: Reason for exam:->hx of liver cirrhosis Additional Contrast?->None Reason for Exam: hx of liver cirrhosis. sob Additional signs and symptoms: no Relevant Medical/Surgical History: 80 ml isovue 370 used FINDINGS: Chest: Mediastinum: Thoracic aorta is nonaneurysmal.  No evidence of dissection. Heart and pericardium appear unremarkable.   Scattered mediastinal nodes but no evidence of significant lymphadenopathy. Largest paratracheal node measures up to 8.1 mm. Main pulmonary artery is enlarged measuring 3.2 cm but no evidence of acute pulmonary embolism. Lungs/pleura: A large right pleural effusion is noted. 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 Atelectatic changes seen in the right upper and right lower lobes with collapse of the entire right lower lobe. Left lung demonstrates no active infiltrates. Minimal atelectatic changes. Soft Tissues/Bones: No acute abnormality. Abdomen/Pelvis: Organs: The liver demonstrates severe cirrhosis with nodularity and shrinkage. No focal masses. Gallbladder is not seen and presumably removed. Pancreas and spleen appear unremarkable. No evidence of splenomegaly at this time. Adrenals, kidneys, aorta and IVC appear stable. Aorta is calcified but nonaneurysmal. GI/Bowel: No evidence of bowel obstruction or perforation. Mild constipation and stool impaction in the rectum. Small bowel appears unremarkable. No evidence of significant varices. Pelvis: Urinary bladder, prostate and seminal vesicles appear stable. Scattered bilateral pelvic nodes which are not significantly enlarged. Left inguinal hernia containing ascitic fluid. Peritoneum/Retroperitoneum: Moderate ascites and mesenteric congestion. Retroperitoneal lymphadenopathy with nodes measuring up to 1.4 cm in the aortocaval region. This was previously seen as well. Bones/Soft Tissues: No acute abnormality. 1. Large right pleural effusion and atelectatic changes in the right upper and right lower lobe with significantly compromised aeration in the right lung. 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 Minimal atelectatic changes left lower lobe. 2. Prominent main pulmonary artery measuring 3.2 cm but no acute pulmonary embolism. Aorta is nonaneurysmal and there is no evidence of dissection. 3. Moderate ascites in the abdomen and pelvis. Mesenteric congestion. 4. Hepatic cirrhosis with nodularity and some shrinkage but no focal abnormality. 5. Redemonstration of retroperitoneal lymphadenopathy. 6. Left inguinal hernia containing ascitic fluid.        Electronically signed by Emily Guevara MD on 1/7/2023 at 1:35 PM

## 2023-01-07 NOTE — CONSULTS
[address] [facility]  consulted by Dr. Nany Cho for monitoring and adjustment. Indication for treatment: Vancomycin indication: Bacteremia  Goal trough: Trough Goal: 15-20 mcg/mL  AUC/TRES: 400-600    Risk Factors for MRSA Identified:   Hospitalization within the past 90 days    Pertinent Laboratory Values:   Temp Readings from Last 3 Encounters:   01/07/23 98 °F (36.7 °C) (Oral)   12/28/22 98.1 °F (36.7 °C) (Temporal)   12/14/22 97.8 °F (36.6 °C) (Temporal)     Recent Labs     01/04/23  1900   WBC 7.5       Recent Labs     01/04/23  2335 01/06/23  0210 01/07/23  0612   BUN 28* 24* 23   CREATININE 1.2 1.2 1.2       Estimated Creatinine Clearance: 60 mL/min (based on SCr of 1.2 mg/dL). Intake/Output Summary (Last 24 hours) at 1/7/2023 1620  Last data filed at 1/7/2023 1101  Gross per 24 hour   Intake 360 ml   Output 1175 ml   Net -815 ml         Pertinent Cultures:   Date    Source    Results  1/4   Blood (4 of 4)   GPC, S. Epi  1/5   Body fluid (Ascitic fluid) NGTD  1/6   Blood                                      Pending    Vancomycin level:   TROUGH:  No results for input(s): VANCOTROUGH in the last 72 hours. RANDOM:  No results for input(s): VANCORANDOM in the last 72 hours. Assessment:  HPI: Josue Krishnan is a 78 y.o. male  who presents with AMS (altered mental status). Hepatic encephalopathy, patient has history of alcoholic cirrhosis. Blood culture 4/4 GPC, now ID'd as S epi. Repeated cultures are pending. Ascitic fluid with NGTD.   SCr, BUN, and urine output: SCR appears just above baseline @1.2 (baseline 1.0-1.1)  Day(s) of therapy: 2 (duration TBD)  Vancomycin concentration:   1/8 - random @06:00    Plan:  Continue vancomycin 1500mg ivpb q24h for a predicted AUC of 482 at steady state  Check the vanco level tomorrow AM  Pharmacy will continue to monitor patient and adjust therapy as indicated    VANCOMYCIN CONCENTRATION SCHEDULED FOR 1/8 @06:00    Thank you for the consult.   Enmanuel Goss [facility], PharmD  1/7/2023 4:20 PM

## 2023-01-07 NOTE — PLAN OF CARE
Problem: Discharge Planning  Goal: Discharge to home or other facility with appropriate resources  1/7/2023 1127 by Kyler Barr RN  Outcome: Progressing  Flowsheets (Taken 1/7/2023 0800)  Discharge to home or other facility with appropriate resources:   Identify barriers to discharge with patient and caregiver   Arrange for needed discharge resources and transportation as appropriate   Identify discharge learning needs (meds, wound care, etc)   Arrange for interpreters to assist at discharge as needed   Refer to discharge planning if patient needs post-hospital services based on physician order or complex needs related to functional status, cognitive ability or social support system  1/6/2023 2248 by Anayeli Mesa RN  Outcome: Progressing     Problem: Chronic Conditions and Co-morbidities  Goal: Patient's chronic conditions and co-morbidity symptoms are monitored and maintained or improved  1/7/2023 1127 by Kyler Barr RN  Outcome: Progressing  Flowsheets (Taken 1/7/2023 0800)  Care Plan - Patient's Chronic Conditions and Co-Morbidity Symptoms are Monitored and Maintained or Improved:   Monitor and assess patient's chronic conditions and comorbid symptoms for stability, deterioration, or improvement   Collaborate with multidisciplinary team to address chronic and comorbid conditions and prevent exacerbation or deterioration   Update acute care plan with appropriate goals if chronic or comorbid symptoms are exacerbated and prevent overall improvement and discharge  1/6/2023 2248 by Anayeli Mesa RN  Outcome: Progressing     Problem: Safety - Adult  Goal: Free from fall injury  1/7/2023 1127 by Kyler Barr RN  Outcome: Progressing  1/6/2023 2248 by Anayeli Mesa RN  Outcome: Progressing     Problem: Pain  Goal: Verbalizes/displays adequate comfort level or baseline comfort level  Outcome: Progressing

## 2023-01-07 NOTE — PROGRESS NOTES
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 ID consult if available   3) ? Echocardiogram/EVA    4) wife has been encouraged to have him keep follow up at [facility] hepatology

## 2023-01-08 LAB
ANION GAP SERPL CALCULATED.3IONS-SCNC: 8 MMOL/L (ref 4–16)
BUN BLDV-MCNC: 26 MG/DL (ref 6–23)
CALCIUM SERPL-MCNC: 9.8 MG/DL (ref 8.3–10.6)
CHLORIDE BLD-SCNC: 97 MMOL/L (ref 99–110)
CO2: 26 MMOL/L (ref 21–32)
CREAT SERPL-MCNC: 1.4 MG/DL (ref 0.9–1.3)
DOSE AMOUNT: NORMAL
DOSE TIME: NORMAL
GFR SERPL CREATININE-BSD FRML MDRD: 52 ML/MIN/1.73M2
GLUCOSE BLD-MCNC: 103 MG/DL (ref 70–99)
HCT VFR BLD CALC: 38.2 % (ref 42–52)
HEMOGLOBIN: 12.4 GM/DL (ref 13.5–18)
MCH RBC QN AUTO: 31.6 PG (ref 27–31)
MCHC RBC AUTO-ENTMCNC: 32.5 % (ref 32–36)
MCV RBC AUTO: 97.2 FL (ref 78–100)
MS ALPHA-FETOPROTEIN: 2 NG/ML (ref 0–9)
PDW BLD-RTO: 13.2 % (ref 11.7–14.9)
PLATELET # BLD: 224 K/CU MM (ref 140–440)
PMV BLD AUTO: 10.2 FL (ref 7.5–11.1)
POTASSIUM SERPL-SCNC: 4.7 MMOL/L (ref 3.5–5.1)
RBC # BLD: 3.93 M/CU MM (ref 4.6–6.2)
SODIUM BLD-SCNC: 131 MMOL/L (ref 135–145)
VANCOMYCIN RANDOM: 21.5 UG/ML
WBC # BLD: 8.1 K/CU MM (ref 4–10.5)

## 2023-01-08 PROCEDURE — 36415 COLL VENOUS BLD VENIPUNCTURE: CPT

## 2023-01-08 PROCEDURE — 2580000003 HC RX 258: Performed by: NURSE PRACTITIONER

## 2023-01-08 PROCEDURE — 85027 COMPLETE CBC AUTOMATED: CPT

## 2023-01-08 PROCEDURE — 94761 N-INVAS EAR/PLS OXIMETRY MLT: CPT

## 2023-01-08 PROCEDURE — 80202 ASSAY OF VANCOMYCIN: CPT

## 2023-01-08 PROCEDURE — 1200000000 HC SEMI PRIVATE

## 2023-01-08 PROCEDURE — 6370000000 HC RX 637 (ALT 250 FOR IP): Performed by: SPECIALIST

## 2023-01-08 PROCEDURE — 97161 PT EVAL LOW COMPLEX 20 MIN: CPT

## 2023-01-08 PROCEDURE — 97116 GAIT TRAINING THERAPY: CPT

## 2023-01-08 PROCEDURE — 6370000000 HC RX 637 (ALT 250 FOR IP): Performed by: NURSE PRACTITIONER

## 2023-01-08 PROCEDURE — 6360000002 HC RX W HCPCS: Performed by: NURSE PRACTITIONER

## 2023-01-08 PROCEDURE — 94664 DEMO&/EVAL PT USE INHALER: CPT

## 2023-01-08 PROCEDURE — 6360000002 HC RX W HCPCS: Performed by: STUDENT IN AN ORGANIZED HEALTH CARE EDUCATION/TRAINING PROGRAM

## 2023-01-08 PROCEDURE — 94640 AIRWAY INHALATION TREATMENT: CPT

## 2023-01-08 PROCEDURE — 2580000003 HC RX 258: Performed by: STUDENT IN AN ORGANIZED HEALTH CARE EDUCATION/TRAINING PROGRAM

## 2023-01-08 PROCEDURE — 80048 BASIC METABOLIC PNL TOTAL CA: CPT

## 2023-01-08 RX ORDER — ALBUTEROL SULFATE 90 UG/1
2 AEROSOL, METERED RESPIRATORY (INHALATION) EVERY 4 HOURS PRN
Status: DISCONTINUED | OUTPATIENT
Start: 2023-01-08 | End: 2023-01-11 | Stop reason: HOSPADM

## 2023-01-08 RX ORDER — ALBUTEROL SULFATE 90 UG/1
2 AEROSOL, METERED RESPIRATORY (INHALATION) 2 TIMES DAILY
Status: DISCONTINUED | OUTPATIENT
Start: 2023-01-08 | End: 2023-01-11 | Stop reason: HOSPADM

## 2023-01-08 RX ADMIN — RIFAXIMIN 550 MG: 550 TABLET ORAL at 09:26

## 2023-01-08 RX ADMIN — LEVOTHYROXINE SODIUM 50 MCG: 0.05 TABLET ORAL at 06:55

## 2023-01-08 RX ADMIN — SODIUM CHLORIDE, PRESERVATIVE FREE 10 ML: 5 INJECTION INTRAVENOUS at 09:26

## 2023-01-08 RX ADMIN — NADOLOL 40 MG: 20 TABLET ORAL at 09:26

## 2023-01-08 RX ADMIN — RIFAXIMIN 550 MG: 550 TABLET ORAL at 20:59

## 2023-01-08 RX ADMIN — FUROSEMIDE 40 MG: 40 TABLET ORAL at 09:26

## 2023-01-08 RX ADMIN — PANTOPRAZOLE SODIUM 20 MG: 20 TABLET, DELAYED RELEASE ORAL at 06:55

## 2023-01-08 RX ADMIN — THIAMINE HYDROCHLORIDE 250 MG: 100 INJECTION, SOLUTION INTRAMUSCULAR; INTRAVENOUS at 22:21

## 2023-01-08 RX ADMIN — DULOXETINE HYDROCHLORIDE 60 MG: 30 CAPSULE, DELAYED RELEASE ORAL at 09:19

## 2023-01-08 RX ADMIN — SPIRONOLACTONE 100 MG: 50 TABLET ORAL at 20:59

## 2023-01-08 RX ADMIN — VANCOMYCIN HYDROCHLORIDE 1250 MG: 1.25 INJECTION, POWDER, LYOPHILIZED, FOR SOLUTION INTRAVENOUS at 17:58

## 2023-01-08 RX ADMIN — SODIUM CHLORIDE, PRESERVATIVE FREE 10 ML: 5 INJECTION INTRAVENOUS at 21:00

## 2023-01-08 RX ADMIN — LACTULOSE 20 G: 20 SOLUTION ORAL at 16:04

## 2023-01-08 RX ADMIN — SPIRONOLACTONE 100 MG: 50 TABLET ORAL at 09:26

## 2023-01-08 RX ADMIN — OMEGA-3-ACID ETHYL ESTERS 2000 MG: 1 CAPSULE, LIQUID FILLED ORAL at 09:19

## 2023-01-08 RX ADMIN — ALBUTEROL SULFATE 2 PUFF: 90 AEROSOL, METERED RESPIRATORY (INHALATION) at 08:53

## 2023-01-08 ASSESSMENT — PAIN SCALES - GENERAL
PAINLEVEL_OUTOF10: 0

## 2023-01-08 NOTE — PLAN OF CARE
Problem: Discharge Planning  Goal: Discharge to home or other facility with appropriate resources  1/7/2023 2118 by Chris Orosco RN  Outcome: Progressing  1/7/2023 1127 by Nancy Ríos RN  Outcome: Progressing  Flowsheets (Taken 1/7/2023 0800)  Discharge to home or other facility with appropriate resources:   Identify barriers to discharge with patient and caregiver   Arrange for needed discharge resources and transportation as appropriate   Identify discharge learning needs (meds, wound care, etc)   Arrange for interpreters to assist at discharge as needed   Refer to discharge planning if patient needs post-hospital services based on physician order or complex needs related to functional status, cognitive ability or social support system     Problem: Chronic Conditions and Co-morbidities  Goal: Patient's chronic conditions and co-morbidity symptoms are monitored and maintained or improved  1/7/2023 2118 by Chris Orosco RN  Outcome: Progressing  1/7/2023 1127 by Nancy Ríos RN  Outcome: Progressing  Flowsheets (Taken 1/7/2023 0800)  Care Plan - Patient's Chronic Conditions and Co-Morbidity Symptoms are Monitored and Maintained or Improved:   Monitor and assess patient's chronic conditions and comorbid symptoms for stability, deterioration, or improvement   Collaborate with multidisciplinary team to address chronic and comorbid conditions and prevent exacerbation or deterioration   Update acute care plan with appropriate goals if chronic or comorbid symptoms are exacerbated and prevent overall improvement and discharge     Problem: Safety - Adult  Goal: Free from fall injury  1/7/2023 2118 by Chris Orosco RN  Outcome: Progressing  1/7/2023 1127 by Nancy Ríos RN  Outcome: Progressing     Problem: Pain  Goal: Verbalizes/displays adequate comfort level or baseline comfort level  1/7/2023 2118 by Chris Orosco RN  Outcome: Progressing  1/7/2023 1127 by Nancy Ríos RN  Outcome: Progressing

## 2023-01-08 NOTE — PROGRESS NOTES
Physical Therapy  Facility/Department: [address] [place] [address]  Physical Therapy Initial Assessment    Name: Camilla Tian  : 1945  MRN: [id number]  Date of Service: 2023    Discharge Recommendations:  Home with Home health PT, Home with assist PRN   PT Equipment Recommendations  Equipment Needed: No      Patient Diagnosis(es): The primary encounter diagnosis was Altered mental status, unspecified altered mental status type. Diagnoses of Acute encephalopathy, Recurrent right pleural effusion, and Stage 3 chronic kidney disease, unspecified whether stage 3a or 3b CKD ([address] were also pertinent to this visit. Past Medical History:  has a past medical history of Anxiety, Cirrhosis, alcoholic ([address] Diabetes mellitus ([address] GERD (gastroesophageal reflux disease), GERD (gastroesophageal reflux disease), Hyperlipidemia, Hypertension, Portal hypertension ([address] Pulmonary nodules, Sleep apnea, SOB (shortness of breath), and Thyroid disease. Past Surgical History:  has a past surgical history that includes Cholecystectomy; Vasectomy; Colonoscopy; Endoscopy, colon, diagnostic; Foot surgery; Upper gastrointestinal endoscopy (N/A, 2022); Tonsillectomy; and Appendectomy. Assessment   Body Structures, Functions, Activity Limitations Requiring Skilled Therapeutic Intervention: Decreased functional mobility ; Decreased ADL status; Decreased body mechanics; Decreased strength;Decreased endurance;Decreased balance;Decreased high-level IADLs;Decreased posture  Assessment: Pt is a 78year old male with only mild complaints of decreased strength and balance. Pt demonstrates BLE strength at +4/5 for all major muscles. Sitting balance is good, however, standing balance is fair with dynamic standing balance being poor to fair. Pt requires CGA for ambulation for short distance. Pt reports owning a walker and cane which he rarely uses but can if needed.  Discussed the use of these devices specifically a cane until patient returns to premorbid balance state. Pt will benefit from home health therapy to improve strength and dynamic balance for return to prior level of ADL's and fall prevention. Therapy Prognosis: Good  Decision Making: Low Complexity  Requires PT Follow-Up: Yes  Activity Tolerance  Activity Tolerance: Patient tolerated evaluation without incident     Plan   Physcial Therapy Plan  General Plan: 2-3 times per week  Current Treatment Recommendations: Strengthening, Balance training, Transfer training, Functional mobility training, ADL/Self-care training, IADL training, Endurance training, Gait training, Stair training, Neuromuscular re-education, Cognitive reorientation, Home exercise program, Safety education & training, Patient/Caregiver education & training, Equipment evaluation, education, & procurement, Therapeutic activities  Restraints  Restraints Initially in Place: No     Restrictions  Restrictions/Precautions  Required Braces or Orthoses?: No     Subjective   General  Chart Reviewed: Yes  Patient assessed for rehabilitation services?: Yes  Family / Caregiver Present: No  Follows Commands: Within Functional Limits  Subjective  Subjective: \"I am waiting to get this IV going again. \"       Social/Functional History  Social/Functional History  Lives With: Spouse  Type of Home: House  Home Layout: One level  Home Access: Stairs to enter with rails, Stairs to enter without rails  Entrance Stairs - Number of Steps: 4-5.  Front no handrails, back has 2 handrails  Entrance Stairs - Rails: Both  Bathroom Shower/Tub: Tub/Shower unit, Walk-in shower, Shower chair without back  Bathroom Toilet: Standard  Bathroom Equipment: Grab bars around toilet  Bathroom Accessibility: Accessible  Home Equipment: Lum [place], rolling  Has the patient had two or more falls in the past year or any fall with injury in the past year?: No  Receives Help From: Family  ADL Assistance: [address]: [address] [facility] Responsibilities: Yes  Ambulation Assistance: Independent  Transfer Assistance: Independent  Active : No  Occupation: Retired  Vision/Hearing  Vision  Vision: Within Functional Limits  Hearing  Hearing Exceptions: Hard of hearing/hearing concerns    Cognition   Orientation  Orientation Level: Oriented to situation;Oriented to person;Oriented to place     Objective   Heart Rate: 77  Heart Rate Source: Monitor  BP: 118/82  BP Location: Right upper arm  BP Method: Automatic  Patient Position: Supine  MAP (Calculated): 94  Resp: 17  SpO2: 97 %  O2 Device: None (Room air)     Observation/Palpation  Posture: Fair  Gross Assessment  AROM: Within functional limits  PROM: Within functional limits  Strength: Generally decreased, functional  Coordination: Within functional limits  Tone: Normal  Sensation: Intact      Bed mobility  Bridging: Stand by assistance  Rolling to Left: Stand by assistance  Rolling to Right: Stand by assistance  Supine to Sit: Contact guard assistance  Sit to Supine: Contact guard assistance  Transfers  Sit to Stand: Contact guard assistance  Stand to Sit: Contact guard assistance  Bed to Chair: Contact guard assistance  Stand Pivot Transfers: Contact guard assistance  Ambulation  WB Status: full  Ambulation  Surface: Level tile  Device: No Device  Assistance: Contact guard assistance  Gait Deviations: Slow Niki;Decreased step length  Distance: 10 ft  More Ambulation?: No  Stairs/Curb  Stairs?: No     Balance  Posture: Fair  Sitting - Static: Good  Sitting - Dynamic: Good  Standing - Static: Fair  Standing - Dynamic: Poor  Exercise Treatment: seated LAQ, marching, ankle pumps.       AM-PAC Score  AM-PAC Inpatient Mobility Raw Score : 18 (01/08/23 0923)  AM-PAC Inpatient T-Scale Score : 43.63 (01/08/23 0923)  Mobility Inpatient CMS 0-100% Score: 46.58 (01/08/23 2581)  Mobility Inpatient CMS G-Code Modifier : CK (01/08/23 8775)     Goals  Short Term Goals  Short Term Goal 1: Pt will perform bed mobility I. Short Term Goal 2: Pt will perform transfers I. Short Term Goal 3: Pt will ambulate I with no AD for 50 ft.     Education  Patient Education  Education Given To: Patient  Education Provided: Role of Therapy;Home Exercise Program;Energy Conservation;Orientation; Fall Prevention Strategies; Equipment;Transfer Training;Plan of Care;Precautions  Education Method: Verbal;Demonstration  Barriers to Learning: Hearing  Education Outcome: Verbalized understanding  Therapy Time   Individual Concurrent Group Co-treatment   Time In 0845         Time Out 0915         Minutes 30          Eval 15 minutes, treatment 15 minutes.      [facility], PT

## 2023-01-08 NOTE — PLAN OF CARE
Problem: Discharge Planning  Goal: Discharge to home or other facility with appropriate resources  Outcome: Progressing  Flowsheets (Taken 1/8/2023 0800)  Discharge to home or other facility with appropriate resources:   Identify barriers to discharge with patient and caregiver   Arrange for needed discharge resources and transportation as appropriate   Identify discharge learning needs (meds, wound care, etc)   Arrange for interpreters to assist at discharge as needed   Refer to discharge planning if patient needs post-hospital services based on physician order or complex needs related to functional status, cognitive ability or social support system     Problem: Chronic Conditions and Co-morbidities  Goal: Patient's chronic conditions and co-morbidity symptoms are monitored and maintained or improved  Outcome: Progressing  Flowsheets (Taken 1/8/2023 0800)  Care Plan - Patient's Chronic Conditions and Co-Morbidity Symptoms are Monitored and Maintained or Improved:   Monitor and assess patient's chronic conditions and comorbid symptoms for stability, deterioration, or improvement   Collaborate with multidisciplinary team to address chronic and comorbid conditions and prevent exacerbation or deterioration   Update acute care plan with appropriate goals if chronic or comorbid symptoms are exacerbated and prevent overall improvement and discharge     Problem: Safety - Adult  Goal: Free from fall injury  Outcome: Progressing     Problem: Pain  Goal: Verbalizes/displays adequate comfort level or baseline comfort level  Outcome: Progressing

## 2023-01-08 NOTE — PROGRESS NOTES
V2.0  [facility] [place] Progress Note      Name:  Hoang Barry /Age/Sex: 1945  (78 y.o. male)   MRN & CSN:  [id number] & [id number] Encounter Date/Time: 2023 2:51 PM EST    Location:  [address] [place] [address] PCP: Kayli Hylton MD       Hospital Day: 5    Assessment and Plan:   Hoang Barry is a 78 y.o. male  who presents with AMS (altered mental status)    1. Hepatic encephalopathy  -Patient has history of alcoholic cirrhosis  -Status post paracentesis  -Resume Xifaxan 550 mg twice daily  -Head CT negative for any acute intracranial process  -GI consulted and following    2. Hepatic cirrhosis  -Secondary to alcoholic liver disease  -History of varices but no bleed  -Follows up with GI  -Recommended to follow-up with hepatology at [facility] and deemed not a candidate for TIPS  -Resume home medications nadolol/spironolactone/Lasix    3. Ascites  -Status post paracentesis  -History of multiple paracentesis  -Ascitic fluid WBC  238  -Continue to follow  -Continue diuretics    4. Right hydrothorax  -Status post right thoracentesis with removal of 2000 cc of fluid    5. Hypertension  -Resume home medications    6. Hypothyroidism  -Resume Synthroid    7. Bacteremia?  -Blood cultures bottle 1 4 out of 4 positive for staph epi. Second bottle 3 out of 4 positive  -Repeat cultures ordered  -Continue vancomycin  -Cardiology consulted for EVA  -ID consulted              Diet ADULT DIET; Regular; Low Sodium (2 gm)  Diet NPO   DVT Prophylaxis [] Lovenox, []  Heparin, [] SCDs, [] Ambulation,  [] Eliquis, [] Xarelto  [] Coumadin   Code Status Full Code   Disposition From: Home  Expected Disposition: Home  Estimated Date of Discharge: 2023  Patient requires continued admission due to hepatic encephalopathy/bacteremia   Surrogate Decision Maker/ POA      Subjective:     Chief Complaint: Altered Mental Status (Arrived per EMS from home, altered since last night per wife.   Hx of AMS w/ elevated ammonia- some right sided weakness, drowsy during triage)     Patient seen and examined at bedside this morning. No acute overnight events reported. Denies chest pain, shortness of breath, nausea vomiting, fever or chills  Objective: Intake/Output Summary (Last 24 hours) at 1/8/2023 1329  Last data filed at 1/7/2023 1800  Gross per 24 hour   Intake 800 ml   Output 400 ml   Net 400 ml        Vitals:   Vitals:    01/08/23 0911   BP: 118/82   Pulse: 77   Resp: 17   Temp: 97.8 °F (36.6 °C)   SpO2: 97%       Physical Exam:     General: NAD  Eyes: EOMI  ENT: neck supple  Cardiovascular: Regular rate. Respiratory: CTABL  Gastrointestinal: Soft, non tender  Genitourinary: no suprapubic tenderness  Musculoskeletal: No edema  Skin: warm, dry  Neuro: Alert. Psych: Mood appropriate.      Medications:   Medications:    albuterol sulfate HFA  2 puff Inhalation BID    vancomycin  1,500 mg IntraVENous Q24H    pantoprazole  20 mg Oral QAM AC    thiamine (VITAMIN B1) IVPB  250 mg IntraVENous Q24H    Followed by    Rohit Helling ON 1/11/2023] thiamine  100 mg Oral Daily    rifAXIMin  550 mg Oral BID    sodium chloride flush  5-40 mL IntraVENous 2 times per day    DULoxetine  60 mg Oral Daily    furosemide  40 mg Oral Daily    lactulose  20 g Oral TID    levothyroxine  50 mcg Oral Daily    nadolol  40 mg Oral Daily    spironolactone  100 mg Oral BID    omega-3 acid ethyl esters  2,000 mg Oral Daily      Infusions:    sodium chloride       PRN Meds: albuterol sulfate HFA, 2 puff, Q4H PRN  sodium chloride flush, 5-40 mL, PRN  sodium chloride, , PRN  ondansetron, 4 mg, Q8H PRN   Or  ondansetron, 4 mg, Q6H PRN      Labs      Recent Results (from the past 24 hour(s))   Basic Metabolic Panel    Collection Time: 01/08/23  2:01 AM   Result Value Ref Range    Sodium 131 (L) 135 - 145 MMOL/L    Potassium 4.7 3.5 - 5.1 MMOL/L    Chloride 97 (L) 99 - 110 mMol/L    CO2 26 21 - 32 MMOL/L    Anion Gap 8 4 - 16    BUN 26 (H) 6 - 23 MG/DL    Creatinine 1.4 (H) 0.9 - 1.3 MG/DL    Est, Glom Filt Rate 52 (L) >60 mL/min/1.73m2    Glucose 103 (H) 70 - 99 MG/DL    Calcium 9.8 8.3 - 10.6 MG/DL   Vancomycin Level, Random    Collection Time: 01/08/23  2:01 AM   Result Value Ref Range    Vancomycin Rm 21.5 UG/ML    DOSE AMOUNT DOSE AMT. GIVEN - 1500mg     DOSE TIME DOSE TIME GIVEN - 1400    CBC    Collection Time: 01/08/23 10:12 AM   Result Value Ref Range    WBC 8.1 4.0 - 10.5 K/CU MM    RBC 3.93 (L) 4.6 - 6.2 M/CU MM    Hemoglobin 12.4 (L) 13.5 - 18.0 GM/DL    Hematocrit 38.2 (L) 42 - 52 %    MCV 97.2 78 - 100 FL    MCH 31.6 (H) 27 - 31 PG    MCHC 32.5 32.0 - 36.0 %    RDW 13.2 11.7 - 14.9 %    Platelets 712 726 - 554 K/CU MM    MPV 10.2 7.5 - 11.1 FL        Imaging/Diagnostics Last 24 Hours   CT HEAD WO CONTRAST    Result Date: 1/4/2023  EXAMINATION: CT OF THE HEAD WITHOUT CONTRAST  1/4/2023 12:44 pm TECHNIQUE: CT of the head was performed without the administration of intravenous contrast. Automated exposure control, iterative reconstruction, and/or weight based adjustment of the mA/kV was utilized to reduce the radiation dose to as low as reasonably achievable. COMPARISON: 11/12/2022, multiple prior head CTs dating back to 02/11/2022 HISTORY: ORDERING SYSTEM PROVIDED HISTORY: ams TECHNOLOGIST PROVIDED HISTORY: Reason for exam:->ams Has a \"code stroke\" or \"stroke alert\" been called? ->No Decision Support Exception - unselect if not a suspected or confirmed emergency medical condition->Emergency Medical Condition (MA) Reason for Exam: ams FINDINGS: BRAIN/VENTRICLES: There is no acute intracranial hemorrhage, mass effect or midline shift. No abnormal extra-axial fluid collection. The gray-white differentiation is maintained without evidence of an acute infarct. There is no evidence of hydrocephalus.  Mild cortical atrophy, white matter changes consistent with microvascular degenerative disease, atherosclerotic calcification in the distal internal carotid arteries bilaterally and distal right vertebral artery uncertain hemodynamic significance. ORBITS: The visualized portion of the orbits demonstrate no acute abnormality. SINUSES: The visualized paranasal sinuses and mastoid air cells demonstrate no acute abnormality. SOFT TISSUES/SKULL:  No acute abnormality of the visualized skull or soft tissues. No noncontrast CT evidence of acute intracranial pathology. No CT etiology for patient's clinical complaint of altered mental status noted on this study. Cortical atrophy, white matter changes consistent with microvascular degenerative disease, atherosclerotic calcification of the distal internal carotid and right vertebral arteries of uncertain hemodynamic significance     XR CHEST PORTABLE    Result Date: 1/4/2023  EXAMINATION: ONE XRAY VIEW OF THE CHEST 1/4/2023 12:42 pm COMPARISON: 12/09/2022 HISTORY: ORDERING SYSTEM PROVIDED HISTORY: Chest Pain TECHNOLOGIST PROVIDED HISTORY: Reason for exam:->Chest Pain Reason for Exam: chest pain FINDINGS: Heart size stable. Left lung clear. Similar appearing pleural and parenchymal opacities throughout the right hemithorax. No pneumothorax.      There appears to be a persistent right pleural fluid and parenchymal opacification the right lung suggesting atelectasis     IR US GUIDED PARACENTESIS    Result Date: 1/5/2023  PROCEDURE: PARACENTESIS WITHOUT IMAGE GUIDANCE US ABDOMEN LIMITED 1/5/2023 HISTORY: ORDERING SYSTEM PROVIDED HISTORY: us- guided paracentesis TECHNOLOGIST PROVIDED HISTORY: Remove as much fluid as possible Please send fluid for: cell count and diff, culture and sensitivity, total protein, albumin Please give 6-8 grams albumin IV for each liter fluid removed Please innoculate fluid immediately into aerobic and non-aerobic blood culture bottles for the culture specimen Reason for exam:->us- guided paracentesis TECHNIQUE: Maximum sterile barrier technique including hand hygiene, skin prep and sterile ultrasound technique utilized for procedure. Sterile ultrasound technique also utilized for procedure Ultrasound guidance required for procedure to confirm presence of ascites, puncture site selection, real-time intra procedural guidance. Images made for patient's medical file. Informed consent was obtained after a detailed explanation of the procedure including risks, benefits, and alternatives. Universal protocol was followed. A limited ultrasound of the abdomen was performed. The right abdomen was prepped and draped in sterile fashion and local anesthesia was achieved with lidocaine. An 6 Malay needle sheath was advanced into ascites and paracentesis was performed. The patient tolerated the procedure well. FINDINGS: Limited ultrasound of the abdomen demonstrates ascites with the paracentesis catheter within it. A total of 4620 mL cloudy white chylous appearing fluid was removed. 1020 mL sample sent for laboratory evaluation. 25 g intravenous albumin utilized for procedure. No complication suggested. Successful ultrasound-guided paracentesis with specimen sent for laboratory evaluation. CTA HEAD NECK W CONTRAST    Result Date: 1/4/2023  EXAMINATION: CTA OF THE HEAD AND NECK WITH CONTRAST 1/4/2023 5:09 pm: TECHNIQUE: CTA of the head and neck was performed with the administration of intravenous contrast. Multiplanar reformatted images are provided for review. MIP images are provided for review. Stenosis of the internal carotid arteries measured using NASCET criteria. Automated exposure control, iterative reconstruction, and/or weight based adjustment of the mA/kV was utilized to reduce the radiation dose to as low as reasonably achievable. COMPARISON: None. HISTORY: ORDERING SYSTEM PROVIDED HISTORY: ams TECHNOLOGIST PROVIDED HISTORY: Reason for exam:->ams Has a \"code stroke\" or \"stroke alert\" been called? ->No Decision Support Exception - unselect if not a suspected or confirmed emergency medical condition->Emergency Medical Condition (MA) Reason for Exam: ams Additional signs and symptoms: no Relevant Medical/Surgical History: 80 ml isovue 370 used FINDINGS: CTA NECK: AORTIC ARCH/ARCH VESSELS: No dissection or arterial injury. No significant stenosis of the brachiocephalic or subclavian arteries. CAROTID ARTERIES: No dissection, arterial injury, or hemodynamically significant stenosis by NASCET criteria. VERTEBRAL ARTERIES: No dissection, arterial injury, or significant stenosis. SOFT TISSUES: There is a large right pleural effusion with a partially visualized mass within the right upper lobe measuring at least 6.6 x 5.8 cm in diameter. Mild mediastinal adenopathy is also noted. BONES: No acute osseous abnormality. CTA HEAD: ANTERIOR CIRCULATION: The anterior and middle cerebral arteries demonstrate normal arborization patterns. There is asymmetric narrowing and attenuation of the left MCA and branch vessels. No aneurysm. POSTERIOR CIRCULATION: No significant stenosis of the vertebral, basilar, or posterior cerebral arteries. No aneurysm. OTHER: No dural venous sinus thrombosis on this non-dedicated study. BRAIN: See separately dictated noncontrast head CT report. No large vessel occlusion visualized within the head or neck. Asymmetric narrowing and attenuation of the left MCA and branch vessels. Recommend digital subtraction angiography for further evaluation. Incidentally noted pleural effusion and partially visualized right upper lobe mass. Please refer to the separately dictated CT chest report for further details and management recommendations. CT CHEST ABDOMEN PELVIS W CONTRAST Additional Contrast? None    Result Date: 1/4/2023  EXAMINATION: CT OF THE CHEST, ABDOMEN, AND PELVIS WITH CONTRAST 1/4/2023 5:11 pm TECHNIQUE: CT of the chest, abdomen and pelvis was performed with the administration of intravenous contrast. Multiplanar reformatted images are provided for review.  Automated exposure control, iterative reconstruction, and/or weight based adjustment of the mA/kV was utilized to reduce the radiation dose to as low as reasonably achievable. COMPARISON: September 13, 2021 HISTORY: ORDERING SYSTEM PROVIDED HISTORY: hx of liver cirrhosis TECHNOLOGIST PROVIDED HISTORY: Reason for exam:->hx of liver cirrhosis Additional Contrast?->None Reason for Exam: hx of liver cirrhosis. sob Additional signs and symptoms: no Relevant Medical/Surgical History: 80 ml isovue 370 used FINDINGS: Chest: Mediastinum: Thoracic aorta is nonaneurysmal.  No evidence of dissection. Heart and pericardium appear unremarkable. Scattered mediastinal nodes but no evidence of significant lymphadenopathy. Largest paratracheal node measures up to 8.1 mm. Main pulmonary artery is enlarged measuring 3.2 cm but no evidence of acute pulmonary embolism. Lungs/pleura: A large right pleural effusion is noted. 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 Atelectatic changes seen in the right upper and right lower lobes with collapse of the entire right lower lobe. Left lung demonstrates no active infiltrates. Minimal atelectatic changes. Soft Tissues/Bones: No acute abnormality. Abdomen/Pelvis: Organs: The liver demonstrates severe cirrhosis with nodularity and shrinkage. No focal masses. Gallbladder is not seen and presumably removed. Pancreas and spleen appear unremarkable. No evidence of splenomegaly at this time. Adrenals, kidneys, aorta and IVC appear stable. Aorta is calcified but nonaneurysmal. GI/Bowel: No evidence of bowel obstruction or perforation. Mild constipation and stool impaction in the rectum. Small bowel appears unremarkable. No evidence of significant varices. Pelvis: Urinary bladder, prostate and seminal vesicles appear stable. Scattered bilateral pelvic nodes which are not significantly enlarged. Left inguinal hernia containing ascitic fluid. Peritoneum/Retroperitoneum: Moderate ascites and mesenteric congestion.  Retroperitoneal lymphadenopathy with nodes measuring up to 1.4 cm in the aortocaval region. This was previously seen as well. Bones/Soft Tissues: No acute abnormality. 1. Large right pleural effusion and atelectatic changes in the right upper and right lower lobe with significantly compromised aeration in the right lung. 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 Minimal atelectatic changes left lower lobe. 2. Prominent main pulmonary artery measuring 3.2 cm but no acute pulmonary embolism. Aorta is nonaneurysmal and there is no evidence of dissection. 3. Moderate ascites in the abdomen and pelvis. Mesenteric congestion. 4. Hepatic cirrhosis with nodularity and some shrinkage but no focal abnormality. 5. Redemonstration of retroperitoneal lymphadenopathy. 6. Left inguinal hernia containing ascitic fluid.        Electronically signed by Micaela Boyer MD on 1/8/2023 at 1:29 PM

## 2023-01-08 NOTE — CONSULTS
[address] [facility]  consulted by Dr. Elaine Fraire for monitoring and adjustment. Indication for treatment: Vancomycin indication: Bacteremia  Goal trough: Trough Goal: 15-20 mcg/mL  AUC/TRES: 400-600    Risk Factors for MRSA Identified:   Hospitalization within the past 90 days    Pertinent Laboratory Values:   Temp Readings from Last 3 Encounters:   01/08/23 97.8 °F (36.6 °C) (Oral)   12/28/22 98.1 °F (36.7 °C) (Temporal)   12/14/22 97.8 °F (36.6 °C) (Temporal)     Recent Labs     01/08/23  1012   WBC 8.1       Recent Labs     01/06/23  0210 01/07/23  0612 01/08/23  0201   BUN 24* 23 26*   CREATININE 1.2 1.2 1.4*       Estimated Creatinine Clearance: 52 mL/min (A) (based on SCr of 1.4 mg/dL (H)). Intake/Output Summary (Last 24 hours) at 1/8/2023 1407  Last data filed at 1/7/2023 1800  Gross per 24 hour   Intake 800 ml   Output 400 ml   Net 400 ml         Pertinent Cultures:   Date    Source    Results  1/4   Blood (4 of 4)   GPC, S. Epi  1/5   Body fluid (Ascitic fluid) NGTD  1/6   Blood                                      NGTD (24h)    Vancomycin level:   TROUGH:  No results for input(s): VANCOTROUGH in the last 72 hours. RANDOM:    Recent Labs     01/08/23  0201   VANCORANDOM 21.5       Assessment:  HPI: Mina Holt is a 78 y.o. male  who presents with AMS (altered mental status). Hepatic encephalopathy, patient has history of alcoholic cirrhosis. Blood culture 4/4 GPC, now ID'd as S epi. Repeated cultures are NGTD. Ascitic fluid with NGTD.   SCr, BUN, and urine output:   SCr appears just above baseline @1.2 (baseline 1.0-1.1) on admission  SCr with increase to 1.4 today  Day(s) of therapy: 3 (duration TBD)  ID consulted, will f/u on ID notes/recs  Vancomycin concentration:   1/8 - 21.5, collected 11.5h post-dose,     Plan:  Reduce vancomycin dosing to 1250 mg IVPB Q24h  Predicted trough: 16.5,   Repeat level in 48h  Pharmacy will continue to monitor patient and adjust therapy as indicated    VANCOMYCIN CONCENTRATION SCHEDULED FOR 1/10 @ 0600    Thank you for the consult,  Jerel [facility]IGHT [facility] [place], PharmD  1/8/2023 2:07 PM

## 2023-01-08 NOTE — RT PROTOCOL NOTE
RT Inhaler-Nebulizer Bronchodilator Protocol Note    There is a bronchodilator order in the chart from a provider indicating to follow the RT Bronchodilator Protocol and there is an Initiate RT Inhaler-Nebulizer Bronchodilator Protocol order as well (see protocol at bottom of note). CXR Findings:  XR CHEST PORTABLE    Result Date: 1/6/2023  Persistent right pleural effusion and airspace disease in the right upper lung field and right lung base. No significant change from the prior study. Follow up to resolution is suggested. The findings from the last RT Protocol Assessment were as follows:   History Pulmonary Disease: Smoker 15 pack years or more  Respiratory Pattern: Regular pattern and RR 12-20 bpm  Breath Sounds: Slightly diminished and/or crackles  Cough: Strong, productive  Indication for Bronchodilator Therapy:    Bronchodilator Assessment Score: 4    Aerosolized bronchodilator medication orders have been revised according to the RT Inhaler-Nebulizer Bronchodilator Protocol below. Respiratory Therapist to perform RT Therapy Protocol Assessment initially then follow the protocol. Repeat RT Therapy Protocol Assessment PRN for score 0-3 or on second treatment, BID, and PRN for scores above 3. No Indications - adjust the frequency to every 6 hours PRN wheezing or bronchospasm, if no treatments needed after 48 hours then discontinue using Per Protocol order mode. If indication present, adjust the RT bronchodilator orders based on the Bronchodilator Assessment Score as indicated below. Use Inhaler orders unless patient has one or more of the following: on home nebulizer, not able to hold breath for 10 seconds, is not alert and oriented, cannot activate and use MDI correctly, or respiratory rate 25 breaths per minute or more, then use the equivalent nebulizer order(s) with same Frequency and PRN reasons based on the score.   If a patient is on this medication at home then do not decrease Frequency below that used at home. 0-3 - enter or revise RT bronchodilator order(s) to equivalent RT Bronchodilator order with Frequency of every 4 hours PRN for wheezing or increased work of breathing using Per Protocol order mode. 4-6 - enter or revise RT Bronchodilator order(s) to two equivalent RT bronchodilator orders with one order with BID Frequency and one order with Frequency of every 4 hours PRN wheezing or increased work of breathing using Per Protocol order mode. 7-10 - enter or revise RT Bronchodilator order(s) to two equivalent RT bronchodilator orders with one order with TID Frequency and one order with Frequency of every 4 hours PRN wheezing or increased work of breathing using Per Protocol order mode. 11-13 - enter or revise RT Bronchodilator order(s) to one equivalent RT bronchodilator order with QID Frequency and an Albuterol order with Frequency of every 4 hours PRN wheezing or increased work of breathing using Per Protocol order mode. Greater than 13 - enter or revise RT Bronchodilator order(s) to one equivalent RT bronchodilator order with every 4 hours Frequency and an Albuterol order with Frequency of every 2 hours PRN wheezing or increased work of breathing using Per Protocol order mode. RT to enter RT Home Evaluation for COPD & MDI Assessment order using Per Protocol order mode.     Electronically signed by Deni Sheppard RCP on 1/8/2023 at 9:01 AM

## 2023-01-09 PROBLEM — Z16.29 BACTEREMIA DUE TO METHICILLIN RESISTANT STAPHYLOCOCCUS EPIDERMIDIS: Status: ACTIVE | Noted: 2023-01-09

## 2023-01-09 PROBLEM — B95.7 BACTEREMIA DUE TO METHICILLIN RESISTANT STAPHYLOCOCCUS EPIDERMIDIS: Status: ACTIVE | Noted: 2023-01-09

## 2023-01-09 PROBLEM — N18.30 STAGE 3 CHRONIC KIDNEY DISEASE (HCC): Status: ACTIVE | Noted: 2023-01-09

## 2023-01-09 PROBLEM — R78.81 BACTEREMIA DUE TO METHICILLIN RESISTANT STAPHYLOCOCCUS EPIDERMIDIS: Status: ACTIVE | Noted: 2023-01-09

## 2023-01-09 LAB
ALBUMIN SERPL-MCNC: 2.6 GM/DL (ref 3.4–5)
ANION GAP SERPL CALCULATED.3IONS-SCNC: 11 MMOL/L (ref 4–16)
BUN BLDV-MCNC: 25 MG/DL (ref 6–23)
C-REACTIVE PROTEIN, HIGH SENSITIVITY: 23.4 MG/L
CALCIUM SERPL-MCNC: 9.8 MG/DL (ref 8.3–10.6)
CHLORIDE BLD-SCNC: 99 MMOL/L (ref 99–110)
CO2: 26 MMOL/L (ref 21–32)
CREAT SERPL-MCNC: 1.3 MG/DL (ref 0.9–1.3)
CULTURE: NORMAL
GFR SERPL CREATININE-BSD FRML MDRD: 57 ML/MIN/1.73M2
GLUCOSE BLD-MCNC: 103 MG/DL (ref 70–99)
GRAM SMEAR: NORMAL
Lab: NORMAL
PHOSPHORUS: 2.9 MG/DL (ref 2.5–4.9)
POTASSIUM SERPL-SCNC: 4.5 MMOL/L (ref 3.5–5.1)
PROCALCITONIN: 0.11
SODIUM BLD-SCNC: 136 MMOL/L (ref 135–145)
SPECIMEN: NORMAL

## 2023-01-09 PROCEDURE — 6360000002 HC RX W HCPCS: Performed by: NURSE PRACTITIONER

## 2023-01-09 PROCEDURE — 97530 THERAPEUTIC ACTIVITIES: CPT

## 2023-01-09 PROCEDURE — 93312 ECHO TRANSESOPHAGEAL: CPT

## 2023-01-09 PROCEDURE — 2580000003 HC RX 258: Performed by: STUDENT IN AN ORGANIZED HEALTH CARE EDUCATION/TRAINING PROGRAM

## 2023-01-09 PROCEDURE — 93312 ECHO TRANSESOPHAGEAL: CPT | Performed by: INTERNAL MEDICINE

## 2023-01-09 PROCEDURE — 6370000000 HC RX 637 (ALT 250 FOR IP): Performed by: STUDENT IN AN ORGANIZED HEALTH CARE EDUCATION/TRAINING PROGRAM

## 2023-01-09 PROCEDURE — 1200000000 HC SEMI PRIVATE

## 2023-01-09 PROCEDURE — 6360000002 HC RX W HCPCS: Performed by: STUDENT IN AN ORGANIZED HEALTH CARE EDUCATION/TRAINING PROGRAM

## 2023-01-09 PROCEDURE — 7100000001 HC PACU RECOVERY - ADDTL 15 MIN

## 2023-01-09 PROCEDURE — 93325 DOPPLER ECHO COLOR FLOW MAPG: CPT | Performed by: INTERNAL MEDICINE

## 2023-01-09 PROCEDURE — 99221 1ST HOSP IP/OBS SF/LOW 40: CPT | Performed by: INTERNAL MEDICINE

## 2023-01-09 PROCEDURE — 84145 PROCALCITONIN (PCT): CPT

## 2023-01-09 PROCEDURE — 99223 1ST HOSP IP/OBS HIGH 75: CPT | Performed by: INTERNAL MEDICINE

## 2023-01-09 PROCEDURE — 2580000003 HC RX 258: Performed by: NURSE PRACTITIONER

## 2023-01-09 PROCEDURE — 80069 RENAL FUNCTION PANEL: CPT

## 2023-01-09 PROCEDURE — 7100000000 HC PACU RECOVERY - FIRST 15 MIN

## 2023-01-09 PROCEDURE — 6370000000 HC RX 637 (ALT 250 FOR IP): Performed by: SPECIALIST

## 2023-01-09 PROCEDURE — 86140 C-REACTIVE PROTEIN: CPT

## 2023-01-09 PROCEDURE — 94640 AIRWAY INHALATION TREATMENT: CPT

## 2023-01-09 PROCEDURE — APPSS60 APP SPLIT SHARED TIME 46-60 MINUTES: Performed by: NURSE PRACTITIONER

## 2023-01-09 PROCEDURE — 6370000000 HC RX 637 (ALT 250 FOR IP): Performed by: NURSE PRACTITIONER

## 2023-01-09 PROCEDURE — 36415 COLL VENOUS BLD VENIPUNCTURE: CPT

## 2023-01-09 RX ORDER — LANOLIN ALCOHOL/MO/W.PET/CERES
3 CREAM (GRAM) TOPICAL NIGHTLY PRN
Status: DISCONTINUED | OUTPATIENT
Start: 2023-01-09 | End: 2023-01-11 | Stop reason: HOSPADM

## 2023-01-09 RX ADMIN — SPIRONOLACTONE 100 MG: 50 TABLET ORAL at 20:40

## 2023-01-09 RX ADMIN — THIAMINE HYDROCHLORIDE 250 MG: 100 INJECTION, SOLUTION INTRAMUSCULAR; INTRAVENOUS at 22:12

## 2023-01-09 RX ADMIN — SODIUM CHLORIDE 25 ML: 9 INJECTION, SOLUTION INTRAVENOUS at 22:08

## 2023-01-09 RX ADMIN — SODIUM CHLORIDE, PRESERVATIVE FREE 10 ML: 5 INJECTION INTRAVENOUS at 20:42

## 2023-01-09 RX ADMIN — DULOXETINE HYDROCHLORIDE 60 MG: 30 CAPSULE, DELAYED RELEASE ORAL at 11:58

## 2023-01-09 RX ADMIN — LACTULOSE 20 G: 20 SOLUTION ORAL at 20:41

## 2023-01-09 RX ADMIN — RIFAXIMIN 550 MG: 550 TABLET ORAL at 20:40

## 2023-01-09 RX ADMIN — RIFAXIMIN 550 MG: 550 TABLET ORAL at 11:58

## 2023-01-09 RX ADMIN — LEVOTHYROXINE SODIUM 50 MCG: 0.05 TABLET ORAL at 11:58

## 2023-01-09 RX ADMIN — Medication 3 MG: at 20:40

## 2023-01-09 RX ADMIN — NADOLOL 40 MG: 20 TABLET ORAL at 11:57

## 2023-01-09 RX ADMIN — OMEGA-3-ACID ETHYL ESTERS 2000 MG: 1 CAPSULE, LIQUID FILLED ORAL at 11:58

## 2023-01-09 RX ADMIN — VANCOMYCIN HYDROCHLORIDE 1250 MG: 1.25 INJECTION, POWDER, LYOPHILIZED, FOR SOLUTION INTRAVENOUS at 17:40

## 2023-01-09 RX ADMIN — FUROSEMIDE 40 MG: 40 TABLET ORAL at 11:58

## 2023-01-09 RX ADMIN — SODIUM CHLORIDE 25 ML: 9 INJECTION, SOLUTION INTRAVENOUS at 17:40

## 2023-01-09 RX ADMIN — ALBUTEROL SULFATE 2 PUFF: 90 AEROSOL, METERED RESPIRATORY (INHALATION) at 21:08

## 2023-01-09 RX ADMIN — SPIRONOLACTONE 100 MG: 50 TABLET ORAL at 11:58

## 2023-01-09 RX ADMIN — SODIUM CHLORIDE, PRESERVATIVE FREE 10 ML: 5 INJECTION INTRAVENOUS at 08:00

## 2023-01-09 NOTE — CONSULTS
Infectious Disease Consult Note  2023   Patient Name: Hoang Barry : 1945   Impression  Staphylococcus epidermidis and CoNS in Blood Cultures: Pathogen vs Contaminant  Persistent Large Right Pleural Effusion:  Alcoholic Cirrhosis with Ascites and Possible Wernicke's Encephalopathy:  Afebrile, no leukocytosis  Pct 0.112, CRP 23.4  -Rapid influenza A/B negative  -COVID-19 negative  -BC  Staphylococcus epidermidis,  CoNS   BC -NGTD  1/10- BC Pending  -S/p per IR Dr. Jeremiah Braun paracentesis: removal of 4620 cc clear ascitic fluid. Cultures: NGTD. Fluid analysis: glucose 135, LD 46, RBC 34, lymphocytes 47, monocytes 47, neutrophils 6, PH 7.5, protein 1.0, , amylase 38, albumin 0.4, mesothelial 5. -CT Chest A&P W Contrast: 1. Large right pleural effusion and atelectatic changes in the right upper   and right lower lobe with significantly compromised aeration in the right   lung. 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 Minimal atelectatic   changes left lower lobe. 2. Prominent main pulmonary artery measuring 3.2 cm but no acute pulmonary   embolism. Aorta is nonaneurysmal and there is no evidence of dissection. 3. Moderate ascites in the abdomen and pelvis. Mesenteric congestion. 4. Hepatic cirrhosis with nodularity and some shrinkage but no focal   abnormality. 5. Redemonstration of retroperitoneal lymphadenopathy. 6. Left inguinal hernia containing ascitic fluid. -CXR: Persistent right pleural effusion and airspace disease in the right upper   lung field and right lung base. No significant change from the prior study. Follow up to resolution is suggested. -S/p per IR, Dr. Jeremiah Braun, Right Thoracentesis, removal of 2,000 cc cloudy white fluid, fluid analysis: Not available  Dr. Thi Stroud, GI, onboard, rec follow up regularly with hepatology at [facility] (MELD is too low for transplant and not a good TIPS candidate). H/o varices wo bleeding.   -EVA: no evidence of endocarditis  JENNIFER:  HTN:  HFpEF:  Hearing Impaired:  Pulmonary Nodules:  Obesity: BMI: 32.66  Multi-Morbidity: per PMHx:Anxiety, alcoholic cirrhosis, GERD, HLD, HTN, portal HTN, pulmonary nodules, JENNIFER, choley    Plan:  Continue IV vancomycin per pharmacy dosing, will treat 3 days past negative BC  Trend CRP and Pct, ordered  Repeat BC until negative at 48 hours  Reviewed [facility] 1/6-0/1 NGTD, but need 0/2, will repeat BC in am 1/10  Appreciate EVA done 1/9- no evidence of IE    Thank you for allowing me to consult in the care of this patient.  ------------------------  REASON FOR CONSULT: Infective syndrome \"Bacteremia\"  Requested by: Dr. Robby Randall is a 78 y.o.  male with a history of alcoholic liver cirrhosis with portal HTN, HTN, hypothyroidism, and morbid obesity who was admitted 1/4/2023 for further evaluation and management of altered mental status for one day prior to admission. He recently had a paracentesis on 12/28/22 of 6.7 L removed and has had a persistent right pleural effusion since that time. His encephalopathy has resolved. His CT of the chest, A&P W Contrast revealed a large right pleural effusion, a prominent main pulmonary artery measuring 3.2 cm but no PE. Moderate ascites in the A&P. Hepatic cirrhosis. He underwent a paracentesis on 1/5 removing 4620 of cloudy white fluid. A right thoracentesis on 1/6 removing 2,000 of cloudy white fluid also. He underwent a EVA on 1/9/23 which showed no evidence of IE. He was started on IV vancomycin on 1/4/23. The HPI was obtained from the patient's wife and the EMR as the patient asleep postop EVA. ? Infectious diseases service was consulted to evaluate the pt, and recommend further investigative and therapeutic measures.   ROS: Other systems reviewed Including eyes, ENT, respiratory, cardiovascular, GI, , dermatologic, neurologic, psych, hem/lymphatic, musculoskeletal and endocrine were negative other than what is mentioned above.      Patient Active Problem List    Diagnosis Date Noted    Sleep related hypoxia 12/06/2022    AMS (altered mental status) 11/12/2022    Hypoxia 11/07/2022    Pleural effusion 10/04/2022    Recurrent pleural effusion on right 09/09/2022    Ex-smoker 08/15/2022    Obesity (BMI 30-39.9) 08/15/2022    Type 2 diabetes mellitus with chronic kidney disease 07/12/2022    Thyroid disease 06/21/2022    Cellulitis of left lower extremity 06/08/2022    Type 2 diabetes mellitus without complication, without long-term current use of insulin ([address] 06/08/2022    Generalized weakness     Oropharyngeal dysphagia     Acute kidney injury superimposed on chronic kidney disease ([address]     Acute respiratory failure ([address] 05/04/2022    Recurrent right pleural effusion 05/02/2022    Sepsis ([address] 03/19/2022    Hepatic encephalopathy 03/19/2022    Hearing loss 03/11/2022    Cirrhosis of liver with ascites ([address]     Secondary esophageal varices without bleeding ([address]     Gastric polyps     SOB (shortness of breath) 09/13/2021    Portal hypertension ([address] 08/30/2021    Bilateral hearing loss 05/07/2021    Increased ammonia level 05/07/2021    Class 3 severe obesity with body mass index (BMI) of 40.0 to 44.9 in adult ([address] 09/21/2020    Hyperglycemia 12/04/2019    Acquired hypothyroidism 12/04/2019    Pulmonary nodules 09/09/2019    Ascites due to alcoholic cirrhosis ([address] 10/24/0357    Mixed hyperlipidemia 08/08/2019    Hypertension 08/08/2019    History of alcohol abuse 08/08/2019    Gastroesophageal reflux disease 08/08/2019    Anxiety 08/08/2019    Shortness of breath 05/26/2019    History of colonic polyps 01/15/2019     Past Medical History:   Diagnosis Date    Anxiety     Cirrhosis, alcoholic ([address]     Diabetes mellitus ([address]     GERD (gastroesophageal reflux disease) 2000    GERD (gastroesophageal reflux disease)     Hyperlipidemia     Hypertension     Portal hypertension ([address]     Pulmonary nodules     Sleep apnea SOB (shortness of breath)     Thyroid disease       Past Surgical History:   Procedure Laterality Date    APPENDECTOMY      CHOLECYSTECTOMY      COLONOSCOPY      ENDOSCOPY, COLON, DIAGNOSTIC      FOOT SURGERY      needle removed,not sure which foot, per wife. TONSILLECTOMY      UPPER GASTROINTESTINAL ENDOSCOPY N/A 2022    EGD BIOPSY performed by Nemo Tobin MD at [address] [place] [address]        Family History   Problem Relation Age of Onset    Hypertension Brother     Diabetes Other       Infectious disease related family history - not contibutory. SOCIAL HISTORY  Social History     Tobacco Use    Smoking status: Former     Packs/day: 1.00     Years: 14.00     Pack years: 14.00     Types: Cigarettes     Quit date:      Years since quittin.0    Smokeless tobacco: Never   Substance Use Topics    Alcohol use: Not Currently      Born: [place], [place]  Lives: [place], [place] with wife  Occupation: Retired   No recent travel of significance. No recent unusual exposures. Pets: one dog, one cat  ? ALLERGIES  Allergies   Allergen Reactions    Lisinopril Swelling     Tongue swelling      Zetia [Ezetimibe] Swelling     Facial swelling    Atorvastatin     Codeine Other (See Comments)     Blood pressure drops    Hydrochlorothiazide     Hydroxyzine      Fatigue and depressed    Lexapro [Escitalopram Oxalate]      Increased depression    Pravastatin       MEDICATIONS  Reviewed and are per the chart/EMR. ?   Antibiotics:   Present:  Vancomycin -  Past:  ?  -------------------------------------------------------------------------------------------------------------------    Vital Signs:  Vitals:    23 1507   BP: (!) 111/49   Pulse: 62   Resp: 18   Temp: 97.7 °F (36.5 °C)   SpO2: 96%         Exam:    VS: noted; wt 214 lb (97.4 kg) Height 5'8\"  Gen: somnolent in bed, no distress  Skin: no stigmata of endocarditis  Wounds: C/D/I  HEMT: AT/NC Oropharynx pink, moist, and without lesions or exudates; dentition in good state of repair  Eyes: PERRLA, EOMI, conjunctiva pink, sclera anicteric. Neck: Supple. Trachea midline. No LAD. Chest: no distress and CTA. Good air movement. Room air. Heart: NSR depressed T wave,  and no MRG. Abd: soft, non-distended, no tenderness, no hepatomegaly. Normoactive bowel sounds. Ext: no clubbing, cyanosis, or edema  Neuro: Mental status intact. CN 2-12 intact and no focal sensory or motor deficits    ? Diagnostic Studies: reviewed  1/4/2023 XR Chest Portable:  Impression   There appears to be a persistent right pleural fluid and parenchymal   opacification the right lung suggesting atelectasis     1/4/2023 CT Head WO Contrast:  Impression   No noncontrast CT evidence of acute intracranial pathology. No CT etiology   for patient's clinical complaint of altered mental status noted on this study. Cortical atrophy, white matter changes consistent with microvascular   degenerative disease, atherosclerotic calcification of the distal internal   carotid and right vertebral arteries of uncertain hemodynamic significance     1/4/2023 CTA Head Neck W Contrast:  Impression   No large vessel occlusion visualized within the head or neck. Asymmetric narrowing and attenuation of the left MCA and branch vessels. Recommend digital subtraction angiography for further evaluation. Incidentally noted pleural effusion and partially visualized right upper lobe   mass. Please refer to the separately dictated CT chest report for further   details and management recommendations. 1/4/2023 CT Chest Abdomen Pelvis W Contrast:   Impression   1. Large right pleural effusion and atelectatic changes in the right upper   and right lower lobe with significantly compromised aeration in the right   lung. 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 Minimal atelectatic   changes left lower lobe.    2. Prominent main pulmonary artery measuring 3.2 cm but no acute pulmonary embolism. Aorta is nonaneurysmal and there is no evidence of dissection. 3. Moderate ascites in the abdomen and pelvis. Mesenteric congestion. 4. Hepatic cirrhosis with nodularity and some shrinkage but no focal   abnormality. 5. Redemonstration of retroperitoneal lymphadenopathy. 6. Left inguinal hernia containing ascitic fluid. 1/5/2023 IR US Guided Paracentesis:  Impression   Successful ultrasound-guided paracentesis with specimen sent for laboratory   evaluation. 1/6/2023 XR Chest Portable:  Impression   Persistent right pleural effusion and airspace disease in the right upper   lung field and right lung base. No significant change from the prior study. Follow up to resolution is suggested. ??  I have examined this patient and available medical records on this date and have made the above observations, conclusions and recommendations. Electronically signed by: Electronically signed by MOHINDER Woodward CNP on 1/8/2023 at 7:07 PM

## 2023-01-09 NOTE — CONSULTS
[address] [facility]  consulted by Dr. Damion Sirera for monitoring and adjustment. Indication for treatment: Vancomycin indication: Bacteremia  Goal trough: Trough Goal: 15-20 mcg/mL  AUC/TRES: 400-600    Risk Factors for MRSA Identified:   Hospitalization within the past 90 days    Pertinent Laboratory Values:   Temp Readings from Last 3 Encounters:   01/09/23 98 °F (36.7 °C) (Oral)   12/28/22 98.1 °F (36.7 °C) (Temporal)   12/14/22 97.8 °F (36.6 °C) (Temporal)     Recent Labs     01/08/23  1012   WBC 8.1       Recent Labs     01/07/23  0612 01/08/23  0201 01/09/23  0553   BUN 23 26* 25*   CREATININE 1.2 1.4* 1.3       Estimated Creatinine Clearance: 56 mL/min (based on SCr of 1.3 mg/dL). Intake/Output Summary (Last 24 hours) at 1/9/2023 1512  Last data filed at 1/9/2023 0000  Gross per 24 hour   Intake 300 ml   Output 1000 ml   Net -700 ml         Pertinent Cultures:   Date    Source    Results  1/4   Blood (4 of 4)   GPC, S. Epi  1/5   Body fluid (Ascitic fluid) NGTD  1/6   Blood                                      NGTD (72h)    Vancomycin level:   TROUGH:  No results for input(s): VANCOTROUGH in the last 72 hours. RANDOM:    Recent Labs     01/08/23  0201   VANCORANDOM 21.5         Assessment:  HPI: Saran Madrid is a 78 y.o. male  who presents with AMS (altered mental status). Hepatic encephalopathy, patient has history of alcoholic cirrhosis. Blood culture 4/4 GPC, now ID'd as S epi. Repeated cultures are NGTD @ 72h. Ascitic fluid with NGTD. SCr, BUN, and urine output:   SCr  just above baseline @1.2 (baseline 1.0-1.1) on admission  SCr remains above baseline, but stable @ 1.3  Day(s) of therapy: 4  Continue for 3 days beyond finalized neg- repeat cultures per ID. Repeat Blood cultures with NGTD x72h.   Vancomycin concentration:   1/8 - 21.5, collected 11.5h post-dose,     Plan:  Continue vancomycin 1250 mg IVPB Q24h  Predicted trough: 16.5,   Repeat level in 24h  Continue vancomycin therapy for 3 days beyond finalized repeat blood cultures per ID.   Pharmacy will continue to monitor patient and adjust therapy as indicated    VANCOMYCIN CONCENTRATION SCHEDULED FOR 1/10 @ 0600    Thank you for the consult,  Sylvester LIGHT [facility], PharmD  1/9/2023 3:12 PM

## 2023-01-09 NOTE — PLAN OF CARE
Problem: Discharge Planning  Goal: Discharge to home or other facility with appropriate resources  1/8/2023 2133 by Rosa Talavera RN  Outcome: Progressing  1/8/2023 1208 by Maritza Monteiro RN  Outcome: Progressing  Flowsheets (Taken 1/8/2023 0800)  Discharge to home or other facility with appropriate resources:   Identify barriers to discharge with patient and caregiver   Arrange for needed discharge resources and transportation as appropriate   Identify discharge learning needs (meds, wound care, etc)   Arrange for interpreters to assist at discharge as needed   Refer to discharge planning if patient needs post-hospital services based on physician order or complex needs related to functional status, cognitive ability or social support system     Problem: Chronic Conditions and Co-morbidities  Goal: Patient's chronic conditions and co-morbidity symptoms are monitored and maintained or improved  1/8/2023 1208 by Maritza Monteiro RN  Outcome: Progressing  Flowsheets (Taken 1/8/2023 0800)  Care Plan - Patient's Chronic Conditions and Co-Morbidity Symptoms are Monitored and Maintained or Improved:   Monitor and assess patient's chronic conditions and comorbid symptoms for stability, deterioration, or improvement   Collaborate with multidisciplinary team to address chronic and comorbid conditions and prevent exacerbation or deterioration   Update acute care plan with appropriate goals if chronic or comorbid symptoms are exacerbated and prevent overall improvement and discharge     Problem: Safety - Adult  Goal: Free from fall injury  1/8/2023 2133 by Rosa Talavera RN  Outcome: Progressing  1/8/2023 1208 by Maritza Monteiro RN  Outcome: Progressing     Problem: Pain  Goal: Verbalizes/displays adequate comfort level or baseline comfort level  1/8/2023 1208 by Maritza Monteiro RN  Outcome: Progressing

## 2023-01-09 NOTE — CARE COORDINATION
CM in to see Pt to follow up on discharge planning. Pt wife Jeffery Silva present. Discharge plan remains home with his wife. Pt agreeable to home care if needed at discharge. Pt denies any needs at this time.      CM following

## 2023-01-09 NOTE — PROGRESS NOTES
Occupational Therapy      Occupational Therapy Treatment Note    Name: Francy Cedeño MRN: [id number] :   1945   Date:  2023   Admission Date: 2023 Room:  [address] [place] [address]-A     Primary Problem:  AMS    Restrictions/Precautions:  Restrictions/Precautions  Required Braces or Orthoses?: No           Communication with other providers: Nursing handoff    Subjective:  Patient states:  \"I knew I could walk\"  Pain:   Location, Type, Intensity (0/10 to 10/10):  No    Objective:    Observation: Pt received supine in bed, agreeable to therapy   Objective Measures:  [facility]    Treatment, including education:  Therapeutic Activity Training:   Therapeutic activity training was instructed today. Cues were given for safety, sequence, UE/LE placement, awareness, and balance. Activities performed today included bed mobility training, sup-sit, sit-stand, functional mobility, stand to sit    Self Care Training:   Cues were given for safety, sequence, UE/LE placement, visual cues, and balance. Activities performed today included grooming    Grooming washing face/hands w/ warm washcloth SBA. Supine to sit Supervision, sitting EOB Supervision, STS from EOB up to stand SBA, functional mobility ~150 feet SBA, stand to sit SBA.     Pt sitting upright in chair, chair alarm on, call light at side, nursing notified       Assessment / Impression:    Patient's tolerance of treatment: Well  Adverse Reaction: None  Significant change in status and impact: Improved from initial evaluation  Barriers to improvement: None noted      Plan for Next Session:    Continue OT POC    Time in:  1630  Time out:  1644  Timed treatment minutes:  14 minutes  Total treatment time:  14 minutes      Electronically signed by:    Jennifer MARRERO/HARRY [id number]  4:55 PM,2023

## 2023-01-09 NOTE — CONSULTS
Name:  Francy Cedeño /Age/Sex: 1945  (78 y.o. male)   MRN & CSN:  [id number] & [id number] Admission Date/Time: 2023 11:34 AM   Location:  [id number] PCP: Gricel Whittington MD       Hospital Day: 6          Referring physician:  Shan Nice MD         Reason for consultation: EVA        Thanks for referral.    Information source: Patient/chart/staff    CC; altered mental status      HPI:   Thank you for involving me in taking  care of Francy Cedeño who  is a 78 y. o.year  Old male  Presents with  chronic kidney disease, cirrhosis of liver with ascites, esophageal varices who presented to the ED via EMS with complaints of altered mental status. Context this patient has been confused and disoriented since last evening. Patient is unable to provide any significant information in response with tangential information. Patient has a history of cirrhosis with ascites. He follows with Dr. Charanjit Dhaliwal. He also has known chronic pleural effusions and wears home oxygen at 2 L nasal cannula at baseline. Blood cultures positive for staph epi hence T ee has been requested                   Past medical history:    has a past medical history of Anxiety, Cirrhosis, alcoholic ([address] Diabetes mellitus ([address] GERD (gastroesophageal reflux disease), GERD (gastroesophageal reflux disease), Hyperlipidemia, Hypertension, Portal hypertension ([address] Pulmonary nodules, Sleep apnea, SOB (shortness of breath), and Thyroid disease. Past surgical history:   has a past surgical history that includes Cholecystectomy; Vasectomy; Colonoscopy; Endoscopy, colon, diagnostic; Foot surgery; Upper gastrointestinal endoscopy (N/A, 2022); Tonsillectomy; and Appendectomy. Social History:   reports that he quit smoking about 47 years ago. His smoking use included cigarettes. He has a 14.00 pack-year smoking history. He has never used smokeless tobacco. He reports that he does not currently use alcohol.  He reports that he does not use drugs. Family history:  family history includes Diabetes in an other family member; Hypertension in his brother.     Allergies   Allergen Reactions    Lisinopril Swelling     Tongue swelling      Zetia [Ezetimibe] Swelling     Facial swelling    Atorvastatin     Codeine Other (See Comments)     Blood pressure drops    Hydrochlorothiazide     Hydroxyzine      Fatigue and depressed    Lexapro [Escitalopram Oxalate]      Increased depression    Pravastatin        albuterol sulfate HFA (PROVENTIL;VENTOLIN;PROAIR) 108 (90 Base) MCG/ACT inhaler 2 puff, BID  albuterol sulfate HFA (PROVENTIL;VENTOLIN;PROAIR) 108 (90 Base) MCG/ACT inhaler 2 puff, Q4H PRN  vancomycin (VANCOCIN) 1,250 mg in dextrose 5 % 250 mL IVPB ([id number]), Q24H  pantoprazole (PROTONIX) tablet 20 mg, QAM AC  thiamine (B-1) 250 mg in sodium chloride 0.9 % 100 mL IVPB, Q24H   Followed by  Rohit Helling ON 1/11/2023] thiamine tablet 100 mg, Daily  rifAXIMin (XIFAXAN) tablet 550 mg, BID  sodium chloride flush 0.9 % injection 5-40 mL, 2 times per day  sodium chloride flush 0.9 % injection 5-40 mL, PRN  0.9 % sodium chloride infusion, PRN  ondansetron (ZOFRAN-ODT) disintegrating tablet 4 mg, Q8H PRN   Or  ondansetron (ZOFRAN) injection 4 mg, Q6H PRN  DULoxetine (CYMBALTA) extended release capsule 60 mg, Daily  furosemide (LASIX) tablet 40 mg, Daily  lactulose (CHRONULAC) 10 GM/15ML solution 20 g, TID  levothyroxine (SYNTHROID) tablet 50 mcg, Daily  nadolol (CORGARD) tablet 40 mg, Daily  spironolactone (ALDACTONE) tablet 100 mg, BID  omega-3 acid ethyl esters (LOVAZA) capsule 2,000 mg, Daily      Current Facility-Administered Medications   Medication Dose Route Frequency Provider Last Rate Last Admin    albuterol sulfate HFA (PROVENTIL;VENTOLIN;PROAIR) 108 (90 Base) MCG/ACT inhaler 2 puff  2 puff Inhalation BID Artemio Johansen MD        albuterol sulfate HFA (PROVENTIL;VENTOLIN;PROAIR) 108 (90 Base) MCG/ACT inhaler 2 puff  2 puff Inhalation Q4H PRN Rosa Elena Azar MD        vancomycin (VANCOCIN) 1,250 mg in dextrose 5 % 250 mL IVPB ([id number])  1,250 mg IntraVENous Q24H Rosa Elena Aazr MD   Stopped at 01/08/23 1928    pantoprazole (PROTONIX) tablet 20 mg  20 mg Oral QAM AC MOHINDER Hatch CNP   20 mg at 01/08/23 1489    thiamine (B-1) 250 mg in sodium chloride 0.9 % 100 mL IVPB  250 mg IntraVENous Q24H MOHINDER Ayala - CNP   Stopped at 01/08/23 2251    Followed by    Jordy Rosas ON 1/11/2023] thiamine tablet 100 mg  100 mg Oral Daily MOHINDER Hatch CNP        rifAXIMin (XIFAXAN) tablet 550 mg  550 mg Oral BID Armando Pink MD   550 mg at 01/08/23 2059    sodium chloride flush 0.9 % injection 5-40 mL  5-40 mL IntraVENous 2 times per day MOHINDER Ayala CNP   10 mL at 01/08/23 2100    sodium chloride flush 0.9 % injection 5-40 mL  5-40 mL IntraVENous PRN MOHINDER Hatch CNP        0.9 % sodium chloride infusion   IntraVENous PRN MOHINDER Hatch CNP        ondansetron (ZOFRAN-ODT) disintegrating tablet 4 mg  4 mg Oral Q8H PRN MOHINDER Hatch CNP        Or    ondansetron (ZOFRAN) injection 4 mg  4 mg IntraVENous Q6H PRN MOHINDER Hatch CNP        DULoxetine (CYMBALTA) extended release capsule 60 mg  60 mg Oral Daily MOHINDER Hatch CNP   60 mg at 01/08/23 0919    furosemide (LASIX) tablet 40 mg  40 mg Oral Daily MOHINDER Hatch CNP   40 mg at 01/08/23 0926    lactulose (CHRONULAC) 10 GM/15ML solution 20 g  20 g Oral TID MOHINDER Ayala - CNP   20 g at 01/08/23 1604    levothyroxine (SYNTHROID) tablet 50 mcg  50 mcg Oral Daily Lori Mathew APRN - CNP   50 mcg at 01/08/23 0655    nadolol (CORGARD) tablet 40 mg  40 mg Oral Daily Sandoval Slate, APRN - CNP   40 mg at 01/08/23 6427    spironolactone (ALDACTONE) tablet 100 mg  100 mg Oral BID Sandoval Slate, APRN - CNP   100 mg at 01/08/23 2059    omega-3 acid ethyl esters (LOVAZA) capsule 2,000 mg  2,000 mg Oral Daily Mikayla ANGELES Chmielewski, APRN - CNP   2,000 mg at 01/08/23 0919     Review of Systems:  All 14 systems reviewed, all negative     Physical Examination:    /67   Pulse 71   Temp 98.1 °F (36.7 °C) (Oral)   Resp 16   Ht 5' 8\" (1.727 m)   Wt 214 lb 12.8 oz (97.4 kg)   SpO2 95%   BMI 32.66 kg/m²      Wt Readings from Last 3 Encounters:   01/06/23 214 lb 12.8 oz (97.4 kg)   12/14/22 230 lb (104.3 kg)   12/09/22 228 lb (103.4 kg)     Body mass index is 32.66 kg/m². General Appearance: Fair  Head: normocephalic     Eyes: normal, noninjected conjunctiva    ENT: normal mucosa, noninjected throat, normal     NECK: No JVP  No thyromegaly        Cardiovascular: No thrills palpated   Auscultation: Normal S1 and S2, no murmur   carotid bruit no   Abdominal Aorta no bruit    Respiratory:    Breath sounds diminished bilaterally    Extremities: Trace edema clubbing ,   no cyanosis    SKIN: Warm and well perfused, no pallor or cyanosis    Vascular exam:  Pedal Pulses: Palp bilaterally        Abdomen:  No masses or tenderness. No organomegaly noted.     Neurological: Alert  Lab Review   Recent Results (from the past 24 hour(s))   CBC    Collection Time: 01/08/23 10:12 AM   Result Value Ref Range    WBC 8.1 4.0 - 10.5 K/CU MM    RBC 3.93 (L) 4.6 - 6.2 M/CU MM    Hemoglobin 12.4 (L) 13.5 - 18.0 GM/DL    Hematocrit 38.2 (L) 42 - 52 %    MCV 97.2 78 - 100 FL    MCH 31.6 (H) 27 - 31 PG    MCHC 32.5 32.0 - 36.0 %    RDW 13.2 11.7 - 14.9 %    Platelets 626 604 - 849 K/CU MM    MPV 10.2 7.5 - 11.1 FL           Assessment/Recommendations:     -Has positive blood cultures we will proceed with a EVA for further assessment         José Miguel Bruno MD, 1/9/2023 7:14 AM       Please note this report has been partially produced using speech recognition software and may contain errors related to that system including errors in grammar, punctuation, and spelling, as well as words and phrases that may be inappropriate. If there are any questions or concerns please feel free to contact the dictating provider for clarification.

## 2023-01-09 NOTE — PROGRESS NOTES
V2.0  [facility] Hospitalist Progress Note      Name:  Samantha Bustamante /Age/Sex: 1945  (78 y.o. male)   MRN & CSN:  [id number] & [id number] Encounter Date/Time: 2023 2:51 PM EST    Location:  [address] [place] [address] PCP: Jose A De La Vega MD       Hospital Day: 6    Assessment and Plan:   Samantha Bustamante is a 78 y.o. male  who presents with AMS (altered mental status)    1. Hepatic encephalopathy  -Patient has history of alcoholic cirrhosis  -Status post paracentesis  -Resume Xifaxan 550 mg twice daily  -Head CT negative for any acute intracranial process  -GI consulted and following    2. Hepatic cirrhosis  -Secondary to alcoholic liver disease  -History of varices but no bleed  -Follows up with GI  -Recommended to follow-up with hepatology at [facility] and deemed not a candidate for TIPS  -Resume home medications nadolol/spironolactone/Lasix    3. Ascites  -Status post paracentesis  -History of multiple paracentesis  -Ascitic fluid WBC  238  -Continue to follow  -Continue diuretics    4. Right hydrothorax  -Status post right thoracentesis with removal of 2000 cc of fluid    5. Hypertension  -Resume home medications    6. Hypothyroidism  -Resume Synthroid    7. Bacteremia?  -Blood cultures bottle 1 4 out of 4 positive for staph epi. Second bottle 3 out of 4 positive  -Repeat cultures ordered  -Continue vancomycin  -Cardiology consulted for EVA. EVA did not show any vegetation  -ID consulted. Follow-up with further recommendations              Diet ADULT DIET;  Regular   DVT Prophylaxis [] Lovenox, []  Heparin, [] SCDs, [] Ambulation,  [] Eliquis, [] Xarelto  [] Coumadin   Code Status Full Code   Disposition From: Home  Expected Disposition: Home  Estimated Date of Discharge: 2023  Patient requires continued admission due to hepatic encephalopathy/bacteremia   Surrogate Decision Maker/ POA      Subjective:     Chief Complaint: Altered Mental Status (Arrived per EMS from home, altered since last night per wife.  Hx of AMS w/ elevated ammonia- some right sided weakness, drowsy during triage)     Patient seen and examined at bedside this morning. Status post EVA today. Denies chest pain, shortness of breath, nausea vomiting, fever or chills. Objective: Intake/Output Summary (Last 24 hours) at 1/9/2023 1509  Last data filed at 1/9/2023 0000  Gross per 24 hour   Intake 300 ml   Output 1000 ml   Net -700 ml        Vitals:   Vitals:    01/09/23 1157   BP: 123/76   Pulse: 67   Resp:    Temp:    SpO2:        Physical Exam:     General: NAD  Eyes: EOMI  ENT: neck supple  Cardiovascular: Regular rate. Respiratory: CTABL  Gastrointestinal: Soft, non tender  Genitourinary: no suprapubic tenderness  Musculoskeletal: No edema  Skin: warm, dry  Neuro: Alert. Psych: Mood appropriate.      Medications:   Medications:    albuterol sulfate HFA  2 puff Inhalation BID    vancomycin  1,250 mg IntraVENous Q24H    pantoprazole  20 mg Oral QAM AC    thiamine (VITAMIN B1) IVPB  250 mg IntraVENous Q24H    Followed by    Sandra Tanesha ON 1/11/2023] thiamine  100 mg Oral Daily    rifAXIMin  550 mg Oral BID    sodium chloride flush  5-40 mL IntraVENous 2 times per day    DULoxetine  60 mg Oral Daily    furosemide  40 mg Oral Daily    lactulose  20 g Oral TID    levothyroxine  50 mcg Oral Daily    nadolol  40 mg Oral Daily    spironolactone  100 mg Oral BID    omega-3 acid ethyl esters  2,000 mg Oral Daily      Infusions:    sodium chloride       PRN Meds: albuterol sulfate HFA, 2 puff, Q4H PRN  sodium chloride flush, 5-40 mL, PRN  sodium chloride, , PRN  ondansetron, 4 mg, Q8H PRN   Or  ondansetron, 4 mg, Q6H PRN      Labs      Recent Results (from the past 24 hour(s))   Renal Function Panel    Collection Time: 01/09/23  5:53 AM   Result Value Ref Range    Sodium 136 135 - 145 MMOL/L    Potassium 4.5 3.5 - 5.1 MMOL/L    Chloride 99 99 - 110 mMol/L    CO2 26 21 - 32 MMOL/L    Anion Gap 11 4 - 16    BUN 25 (H) 6 - 23 MG/DL    Creatinine 1.3 0.9 - 1.3 MG/DL    Est, Glom Filt Rate 57 (L) >60 mL/min/1.73m2    Glucose 103 (H) 70 - 99 MG/DL    Calcium 9.8 8.3 - 10.6 MG/DL    Albumin 2.6 (L) 3.4 - 5.0 GM/DL    Phosphorus 2.9 2.5 - 4.9 MG/DL   C-Reactive Protein    Collection Time: 01/09/23  5:53 AM   Result Value Ref Range    CRP High Sensitivity 23.4 (H) <5.0 mg/L   Procalcitonin    Collection Time: 01/09/23  5:53 AM   Result Value Ref Range    Procalcitonin 0.112         Imaging/Diagnostics Last 24 Hours   CT HEAD WO CONTRAST    Result Date: 1/4/2023  EXAMINATION: CT OF THE HEAD WITHOUT CONTRAST  1/4/2023 12:44 pm TECHNIQUE: CT of the head was performed without the administration of intravenous contrast. Automated exposure control, iterative reconstruction, and/or weight based adjustment of the mA/kV was utilized to reduce the radiation dose to as low as reasonably achievable. COMPARISON: 11/12/2022, multiple prior head CTs dating back to 02/11/2022 HISTORY: ORDERING SYSTEM PROVIDED HISTORY: ams TECHNOLOGIST PROVIDED HISTORY: Reason for exam:->ams Has a \"code stroke\" or \"stroke alert\" been called? ->No Decision Support Exception - unselect if not a suspected or confirmed emergency medical condition->Emergency Medical Condition (MA) Reason for Exam: ams FINDINGS: BRAIN/VENTRICLES: There is no acute intracranial hemorrhage, mass effect or midline shift. No abnormal extra-axial fluid collection. The gray-white differentiation is maintained without evidence of an acute infarct. There is no evidence of hydrocephalus. Mild cortical atrophy, white matter changes consistent with microvascular degenerative disease, atherosclerotic calcification in the distal internal carotid arteries bilaterally and distal right vertebral artery uncertain hemodynamic significance. ORBITS: The visualized portion of the orbits demonstrate no acute abnormality. SINUSES: The visualized paranasal sinuses and mastoid air cells demonstrate no acute abnormality.  SOFT TISSUES/SKULL:  No acute abnormality of the visualized skull or soft tissues. No noncontrast CT evidence of acute intracranial pathology. No CT etiology for patient's clinical complaint of altered mental status noted on this study. Cortical atrophy, white matter changes consistent with microvascular degenerative disease, atherosclerotic calcification of the distal internal carotid and right vertebral arteries of uncertain hemodynamic significance     XR CHEST PORTABLE    Result Date: 1/4/2023  EXAMINATION: ONE XRAY VIEW OF THE CHEST 1/4/2023 12:42 pm COMPARISON: 12/09/2022 HISTORY: ORDERING SYSTEM PROVIDED HISTORY: Chest Pain TECHNOLOGIST PROVIDED HISTORY: Reason for exam:->Chest Pain Reason for Exam: chest pain FINDINGS: Heart size stable. Left lung clear. Similar appearing pleural and parenchymal opacities throughout the right hemithorax. No pneumothorax. There appears to be a persistent right pleural fluid and parenchymal opacification the right lung suggesting atelectasis     IR US GUIDED PARACENTESIS    Result Date: 1/5/2023  PROCEDURE: PARACENTESIS WITHOUT IMAGE GUIDANCE US ABDOMEN LIMITED 1/5/2023 HISTORY: ORDERING SYSTEM PROVIDED HISTORY: us- guided paracentesis TECHNOLOGIST PROVIDED HISTORY: Remove as much fluid as possible Please send fluid for: cell count and diff, culture and sensitivity, total protein, albumin Please give 6-8 grams albumin IV for each liter fluid removed Please innoculate fluid immediately into aerobic and non-aerobic blood culture bottles for the culture specimen Reason for exam:->us- guided paracentesis TECHNIQUE: Maximum sterile barrier technique including hand hygiene, skin prep and sterile ultrasound technique utilized for procedure. Sterile ultrasound technique also utilized for procedure Ultrasound guidance required for procedure to confirm presence of ascites, puncture site selection, real-time intra procedural guidance. Images made for patient's medical file.   Informed consent was obtained after a detailed explanation of the procedure including risks, benefits, and alternatives. Universal protocol was followed. A limited ultrasound of the abdomen was performed. The right abdomen was prepped and draped in sterile fashion and local anesthesia was achieved with lidocaine. An 6 Upper sorbian needle sheath was advanced into ascites and paracentesis was performed. The patient tolerated the procedure well. FINDINGS: Limited ultrasound of the abdomen demonstrates ascites with the paracentesis catheter within it. A total of 4620 mL cloudy white chylous appearing fluid was removed. 1020 mL sample sent for laboratory evaluation. 25 g intravenous albumin utilized for procedure. No complication suggested. Successful ultrasound-guided paracentesis with specimen sent for laboratory evaluation. CTA HEAD NECK W CONTRAST    Result Date: 1/4/2023  EXAMINATION: CTA OF THE HEAD AND NECK WITH CONTRAST 1/4/2023 5:09 pm: TECHNIQUE: CTA of the head and neck was performed with the administration of intravenous contrast. Multiplanar reformatted images are provided for review. MIP images are provided for review. Stenosis of the internal carotid arteries measured using NASCET criteria. Automated exposure control, iterative reconstruction, and/or weight based adjustment of the mA/kV was utilized to reduce the radiation dose to as low as reasonably achievable. COMPARISON: None. HISTORY: ORDERING SYSTEM PROVIDED HISTORY: ams TECHNOLOGIST PROVIDED HISTORY: Reason for exam:->ams Has a \"code stroke\" or \"stroke alert\" been called? ->No Decision Support Exception - unselect if not a suspected or confirmed emergency medical condition->Emergency Medical Condition (MA) Reason for Exam: ams Additional signs and symptoms: no Relevant Medical/Surgical History: 80 ml isovue 370 used FINDINGS: CTA NECK: AORTIC ARCH/ARCH VESSELS: No dissection or arterial injury. No significant stenosis of the brachiocephalic or subclavian arteries. CAROTID ARTERIES: No dissection, arterial injury, or hemodynamically significant stenosis by NASCET criteria. VERTEBRAL ARTERIES: No dissection, arterial injury, or significant stenosis. SOFT TISSUES: There is a large right pleural effusion with a partially visualized mass within the right upper lobe measuring at least 6.6 x 5.8 cm in diameter. Mild mediastinal adenopathy is also noted. BONES: No acute osseous abnormality. CTA HEAD: ANTERIOR CIRCULATION: The anterior and middle cerebral arteries demonstrate normal arborization patterns. There is asymmetric narrowing and attenuation of the left MCA and branch vessels. No aneurysm. POSTERIOR CIRCULATION: No significant stenosis of the vertebral, basilar, or posterior cerebral arteries. No aneurysm. OTHER: No dural venous sinus thrombosis on this non-dedicated study. BRAIN: See separately dictated noncontrast head CT report. No large vessel occlusion visualized within the head or neck. Asymmetric narrowing and attenuation of the left MCA and branch vessels. Recommend digital subtraction angiography for further evaluation. Incidentally noted pleural effusion and partially visualized right upper lobe mass. Please refer to the separately dictated CT chest report for further details and management recommendations. CT CHEST ABDOMEN PELVIS W CONTRAST Additional Contrast? None    Result Date: 1/4/2023  EXAMINATION: CT OF THE CHEST, ABDOMEN, AND PELVIS WITH CONTRAST 1/4/2023 5:11 pm TECHNIQUE: CT of the chest, abdomen and pelvis was performed with the administration of intravenous contrast. Multiplanar reformatted images are provided for review. Automated exposure control, iterative reconstruction, and/or weight based adjustment of the mA/kV was utilized to reduce the radiation dose to as low as reasonably achievable.  COMPARISON: September 13, 2021 HISTORY: ORDERING SYSTEM PROVIDED HISTORY: hx of liver cirrhosis TECHNOLOGIST PROVIDED HISTORY: Reason for exam:->hx of liver cirrhosis Additional Contrast?->None Reason for Exam: hx of liver cirrhosis. sob Additional signs and symptoms: no Relevant Medical/Surgical History: 80 ml isovue 370 used FINDINGS: Chest: Mediastinum: Thoracic aorta is nonaneurysmal.  No evidence of dissection. Heart and pericardium appear unremarkable. Scattered mediastinal nodes but no evidence of significant lymphadenopathy. Largest paratracheal node measures up to 8.1 mm. Main pulmonary artery is enlarged measuring 3.2 cm but no evidence of acute pulmonary embolism. Lungs/pleura: A large right pleural effusion is noted. 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 Atelectatic changes seen in the right upper and right lower lobes with collapse of the entire right lower lobe. Left lung demonstrates no active infiltrates. Minimal atelectatic changes. Soft Tissues/Bones: No acute abnormality. Abdomen/Pelvis: Organs: The liver demonstrates severe cirrhosis with nodularity and shrinkage. No focal masses. Gallbladder is not seen and presumably removed. Pancreas and spleen appear unremarkable. No evidence of splenomegaly at this time. Adrenals, kidneys, aorta and IVC appear stable. Aorta is calcified but nonaneurysmal. GI/Bowel: No evidence of bowel obstruction or perforation. Mild constipation and stool impaction in the rectum. Small bowel appears unremarkable. No evidence of significant varices. Pelvis: Urinary bladder, prostate and seminal vesicles appear stable. Scattered bilateral pelvic nodes which are not significantly enlarged. Left inguinal hernia containing ascitic fluid. Peritoneum/Retroperitoneum: Moderate ascites and mesenteric congestion. Retroperitoneal lymphadenopathy with nodes measuring up to 1.4 cm in the aortocaval region. This was previously seen as well. Bones/Soft Tissues: No acute abnormality.      1. Large right pleural effusion and atelectatic changes in the right upper and right lower lobe with significantly compromised aeration in the right lung. 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 Minimal atelectatic changes left lower lobe. 2. Prominent main pulmonary artery measuring 3.2 cm but no acute pulmonary embolism. Aorta is nonaneurysmal and there is no evidence of dissection. 3. Moderate ascites in the abdomen and pelvis. Mesenteric congestion. 4. Hepatic cirrhosis with nodularity and some shrinkage but no focal abnormality. 5. Redemonstration of retroperitoneal lymphadenopathy. 6. Left inguinal hernia containing ascitic fluid.        Electronically signed by Leticia Dobbins MD on 1/9/2023 at 3:09 PM

## 2023-01-10 LAB
ALBUMIN SERPL-MCNC: 2.7 GM/DL (ref 3.4–5)
ANION GAP SERPL CALCULATED.3IONS-SCNC: 9 MMOL/L (ref 4–16)
BUN BLDV-MCNC: 26 MG/DL (ref 6–23)
C-REACTIVE PROTEIN, HIGH SENSITIVITY: 27.2 MG/L
CALCIUM SERPL-MCNC: 9.8 MG/DL (ref 8.3–10.6)
CHLORIDE BLD-SCNC: 99 MMOL/L (ref 99–110)
CO2: 27 MMOL/L (ref 21–32)
CREAT SERPL-MCNC: 1.5 MG/DL (ref 0.9–1.3)
DOSE AMOUNT: NORMAL
DOSE TIME: NORMAL
GFR SERPL CREATININE-BSD FRML MDRD: 48 ML/MIN/1.73M2
GLUCOSE BLD-MCNC: 94 MG/DL (ref 70–99)
PHOSPHORUS: 3.3 MG/DL (ref 2.5–4.9)
POTASSIUM SERPL-SCNC: 4.8 MMOL/L (ref 3.5–5.1)
PROCALCITONIN: 0.1
SODIUM BLD-SCNC: 135 MMOL/L (ref 135–145)
VANCOMYCIN RANDOM: 23.9 UG/ML
VITAMIN B1, PLASMA: 116 NMOL/L (ref 70–180)

## 2023-01-10 PROCEDURE — 80069 RENAL FUNCTION PANEL: CPT

## 2023-01-10 PROCEDURE — 6370000000 HC RX 637 (ALT 250 FOR IP): Performed by: NURSE PRACTITIONER

## 2023-01-10 PROCEDURE — 87040 BLOOD CULTURE FOR BACTERIA: CPT

## 2023-01-10 PROCEDURE — 6370000000 HC RX 637 (ALT 250 FOR IP): Performed by: SPECIALIST

## 2023-01-10 PROCEDURE — 84145 PROCALCITONIN (PCT): CPT

## 2023-01-10 PROCEDURE — 94664 DEMO&/EVAL PT USE INHALER: CPT

## 2023-01-10 PROCEDURE — 36415 COLL VENOUS BLD VENIPUNCTURE: CPT

## 2023-01-10 PROCEDURE — 6360000002 HC RX W HCPCS: Performed by: NURSE PRACTITIONER

## 2023-01-10 PROCEDURE — 80202 ASSAY OF VANCOMYCIN: CPT

## 2023-01-10 PROCEDURE — 86140 C-REACTIVE PROTEIN: CPT

## 2023-01-10 PROCEDURE — 94640 AIRWAY INHALATION TREATMENT: CPT

## 2023-01-10 PROCEDURE — 2580000003 HC RX 258: Performed by: STUDENT IN AN ORGANIZED HEALTH CARE EDUCATION/TRAINING PROGRAM

## 2023-01-10 PROCEDURE — 1200000000 HC SEMI PRIVATE

## 2023-01-10 PROCEDURE — 6360000002 HC RX W HCPCS: Performed by: STUDENT IN AN ORGANIZED HEALTH CARE EDUCATION/TRAINING PROGRAM

## 2023-01-10 PROCEDURE — 2700000000 HC OXYGEN THERAPY PER DAY

## 2023-01-10 PROCEDURE — 94761 N-INVAS EAR/PLS OXIMETRY MLT: CPT

## 2023-01-10 PROCEDURE — 99231 SBSQ HOSP IP/OBS SF/LOW 25: CPT | Performed by: SPECIALIST

## 2023-01-10 PROCEDURE — 2580000003 HC RX 258: Performed by: NURSE PRACTITIONER

## 2023-01-10 RX ADMIN — ALBUTEROL SULFATE 2 PUFF: 90 AEROSOL, METERED RESPIRATORY (INHALATION) at 08:35

## 2023-01-10 RX ADMIN — ALBUTEROL SULFATE 2 PUFF: 90 AEROSOL, METERED RESPIRATORY (INHALATION) at 21:04

## 2023-01-10 RX ADMIN — SODIUM CHLORIDE, PRESERVATIVE FREE 10 ML: 5 INJECTION INTRAVENOUS at 09:24

## 2023-01-10 RX ADMIN — THIAMINE HYDROCHLORIDE 250 MG: 100 INJECTION, SOLUTION INTRAMUSCULAR; INTRAVENOUS at 22:32

## 2023-01-10 RX ADMIN — OMEGA-3-ACID ETHYL ESTERS 2000 MG: 1 CAPSULE, LIQUID FILLED ORAL at 09:23

## 2023-01-10 RX ADMIN — NADOLOL 40 MG: 20 TABLET ORAL at 09:23

## 2023-01-10 RX ADMIN — DULOXETINE HYDROCHLORIDE 60 MG: 30 CAPSULE, DELAYED RELEASE ORAL at 09:23

## 2023-01-10 RX ADMIN — RIFAXIMIN 550 MG: 550 TABLET ORAL at 09:23

## 2023-01-10 RX ADMIN — PANTOPRAZOLE SODIUM 20 MG: 20 TABLET, DELAYED RELEASE ORAL at 05:14

## 2023-01-10 RX ADMIN — SPIRONOLACTONE 100 MG: 50 TABLET ORAL at 09:23

## 2023-01-10 RX ADMIN — VANCOMYCIN HYDROCHLORIDE 1250 MG: 1.25 INJECTION, POWDER, LYOPHILIZED, FOR SOLUTION INTRAVENOUS at 18:23

## 2023-01-10 RX ADMIN — RIFAXIMIN 550 MG: 550 TABLET ORAL at 21:05

## 2023-01-10 RX ADMIN — LACTULOSE 20 G: 20 SOLUTION ORAL at 09:23

## 2023-01-10 RX ADMIN — SPIRONOLACTONE 100 MG: 50 TABLET ORAL at 21:05

## 2023-01-10 RX ADMIN — FUROSEMIDE 40 MG: 40 TABLET ORAL at 09:23

## 2023-01-10 RX ADMIN — LEVOTHYROXINE SODIUM 50 MCG: 0.05 TABLET ORAL at 05:14

## 2023-01-10 RX ADMIN — SODIUM CHLORIDE 5 ML/HR: 9 INJECTION, SOLUTION INTRAVENOUS at 18:22

## 2023-01-10 ASSESSMENT — PAIN SCALES - GENERAL: PAINLEVEL_OUTOF10: 0

## 2023-01-10 NOTE — PROGRESS NOTES
Physical Therapy  Att. Pt stated \"if you would of come earlier I would have gone for a walker but now I'm tired and want to take a nap\". Pt educated on benefit of tx and still declined.

## 2023-01-10 NOTE — PROGRESS NOTES
Infectious Disease Progress Note  1/10/2023   Patient Name: Solomon Sosa : 1945   Impression  Staphylococcus epidermidis and CoNS in Blood Cultures: Pathogen vs Contaminant  Persistent Large Right Pleural Effusion:  Alcoholic Cirrhosis with Ascites and Possible Wernicke's Encephalopathy:  Afebrile, no leukocytosis  Patient has clinically improved, Pct and CRP have remained low. -Rapid influenza A/B negative  -COVID-19 negative  -BC  Staphylococcus epidermidis,  CoNS   BC -NGTD  1/10- BC Pending  -S/p per IR Dr. Lex Shah paracentesis: removal of 4620 cc clear ascitic fluid. Cultures: NGTD. Fluid analysis: glucose 135, LD 46, RBC 34, lymphocytes 47, monocytes 47, neutrophils 6, PH 7.5, protein 1.0, , amylase 38, albumin 0.4, mesothelial 5. -CT Chest A&P W Contrast: 1. Large right pleural effusion and atelectatic changes in the right upper   and right lower lobe with significantly compromised aeration in the right   lung. 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 Minimal atelectatic   changes left lower lobe. 2. Prominent main pulmonary artery measuring 3.2 cm but no acute pulmonary   embolism. Aorta is nonaneurysmal and there is no evidence of dissection. 3. Moderate ascites in the abdomen and pelvis. Mesenteric congestion. 4. Hepatic cirrhosis with nodularity and some shrinkage but no focal   abnormality. 5. Redemonstration of retroperitoneal lymphadenopathy. 6. Left inguinal hernia containing ascitic fluid. -CXR: Persistent right pleural effusion and airspace disease in the right upper   lung field and right lung base. No significant change from the prior study. Follow up to resolution is suggested.    -S/p per IR, Dr. Lex Shah, Right Thoracentesis, removal of 2,000 cc cloudy white fluid, fluid analysis: Not available  Dr. Belinda De La Rosa [facility], onboard, rec follow up regularly with hepatology at [facility] (MELD is too low for transplant and not a good TIPS candidate). H/o varices wo bleeding. 1/9-EVA: no evidence of endocarditis  JENNIFER:  HTN:  HFpEF:  Hearing Impaired:  Pulmonary Nodules:  Obesity: BMI: 32.66  Multi-Morbidity: per PMHx:Anxiety, alcoholic cirrhosis, GERD, HLD, HTN, portal HTN, pulmonary nodules, JENNIFER, choley     Plan:  Continue IV vancomycin per pharmacy dosing, will treat 3 days past negative BC,  may transition for DC to po doxycycline to complete therapy  Trend CRP and Pct, ordered  Repeat BC until negative at 48 hours  Reviewed [facility] 1/6-0/1 NGTD, but need 0/2, will await BC from 1/10  DW patient the plan of care, he verbalized understanding    Ongoing Antimicrobial Therapy  Vancomycin 1/6-? Completed Antimicrobial Therapy  ? History:? Interval history noted. Chief complaint:   Staphylococcus epidermidis bacteremia. Denies n/v/d/f or untoward effects of antibiotics. States is feeling well, wants to go home. Physical Exam:  Vital Signs: BP (!) 100/58   Pulse 67   Temp 97.8 °F (36.6 °C) (Oral)   Resp 10   Ht 5' 8\" (1.727 m)   Wt 214 lb 12.8 oz (97.4 kg)   SpO2 98%   BMI 32.66 kg/m²     Gen: A&O x 4, no distress sitting up in the chair. Chest: no distress and CTA. Good air movement. Room air. Heart: NSR depressed T wave,  and no MRG. Abd: soft, non-distended, no tenderness, no hepatomegaly. Normoactive bowel sounds. Ext: no clubbing, cyanosis, or edema  Neuro: Mental status intact. CN 2-12 intact and no focal sensory or motor deficits     Radiologic / Imaging / TESTING  1/4/2023 XR Chest Portable:  Impression   There appears to be a persistent right pleural fluid and parenchymal   opacification the right lung suggesting atelectasis      1/4/2023 CT Head WO Contrast:  Impression   No noncontrast CT evidence of acute intracranial pathology. No CT etiology   for patient's clinical complaint of altered mental status noted on this study.        Cortical atrophy, white matter changes consistent with microvascular   degenerative disease, atherosclerotic calcification of the distal internal   carotid and right vertebral arteries of uncertain hemodynamic significance      1/4/2023 CTA Head Neck W Contrast:  Impression   No large vessel occlusion visualized within the head or neck. Asymmetric narrowing and attenuation of the left MCA and branch vessels. Recommend digital subtraction angiography for further evaluation. Incidentally noted pleural effusion and partially visualized right upper lobe   mass. Please refer to the separately dictated CT chest report for further   details and management recommendations. 1/4/2023 CT Chest Abdomen Pelvis W Contrast:   Impression   1. Large right pleural effusion and atelectatic changes in the right upper   and right lower lobe with significantly compromised aeration in the right   lung. 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 Minimal atelectatic   changes left lower lobe. 2. Prominent main pulmonary artery measuring 3.2 cm but no acute pulmonary   embolism. Aorta is nonaneurysmal and there is no evidence of dissection. 3. Moderate ascites in the abdomen and pelvis. Mesenteric congestion. 4. Hepatic cirrhosis with nodularity and some shrinkage but no focal   abnormality. 5. Redemonstration of retroperitoneal lymphadenopathy. 6. Left inguinal hernia containing ascitic fluid. 1/5/2023 IR US Guided Paracentesis:  Impression   Successful ultrasound-guided paracentesis with specimen sent for laboratory   evaluation. 1/6/2023 XR Chest Portable:  Impression   Persistent right pleural effusion and airspace disease in the right upper   lung field and right lung base. No significant change from the prior study. Follow up to resolution is suggested. 1/9/2023 EVA:  Summary   Left ventricular systolic function is normal.   No evidence of thrombus within the left atrial appendage. Negative bubble study; no ASD or PFO noted. Structurally normal aortic valve. Structurally normal mitral valve. Tricuspid valve was not well visualized. No obvious evidence of endocarditis. Labs:    Recent Results (from the past 24 hour(s))   Vancomycin Level, Random    Collection Time: 01/10/23  4:02 AM   Result Value Ref Range    Vancomycin Rm 23.9 UG/ML    DOSE AMOUNT DOSE AMT.  GIVEN - `     DOSE TIME DOSE TIME GIVEN - `    Renal Function Panel    Collection Time: 01/10/23  4:02 AM   Result Value Ref Range    Sodium 135 135 - 145 MMOL/L    Potassium 4.8 3.5 - 5.1 MMOL/L    Chloride 99 99 - 110 mMol/L    CO2 27 21 - 32 MMOL/L    Anion Gap 9 4 - 16    BUN 26 (H) 6 - 23 MG/DL    Creatinine 1.5 (H) 0.9 - 1.3 MG/DL    Est, Glom Filt Rate 48 (L) >60 mL/min/1.73m2    Glucose 94 70 - 99 MG/DL    Calcium 9.8 8.3 - 10.6 MG/DL    Albumin 2.7 (L) 3.4 - 5.0 GM/DL    Phosphorus 3.3 2.5 - 4.9 MG/DL   C-Reactive Protein    Collection Time: 01/10/23  4:02 AM   Result Value Ref Range    CRP High Sensitivity 27.2 (H) <5.0 mg/L   Procalcitonin    Collection Time: 01/10/23  4:02 AM   Result Value Ref Range    Procalcitonin 0.098      CULTURE results: Invalid input(s): BLOOD CULTURE,  URINE CULTURE, SURGICAL CULTURE    Diagnosis:  Patient Active Problem List   Diagnosis    Shortness of breath    History of colonic polyps    Ascites due to alcoholic cirrhosis (HCC)    Mixed hyperlipidemia    Hypertension    History of alcohol abuse    Gastroesophageal reflux disease    Anxiety    Pulmonary nodules    Hyperglycemia    Acquired hypothyroidism    Class 3 severe obesity with body mass index (BMI) of 40.0 to 44.9 in adult [facility])    Bilateral hearing loss    Increased ammonia level    Portal hypertension (HCC)    SOB (shortness of breath)    Decompensation of cirrhosis of liver (HCC)    Secondary esophageal varices without bleeding (HCC)    Gastric polyps    Hearing loss    Septicemia (HCC)    Hepatic encephalopathy    Recurrent right pleural effusion    Acute respiratory failure (HCC)    Acute kidney injury superimposed on chronic kidney disease (HCC)    Generalized weakness    Oropharyngeal dysphagia    Cellulitis of left lower extremity    Type 2 diabetes mellitus without complication, without long-term current use of insulin (HCC)    Thyroid disease    Type 2 diabetes mellitus with chronic kidney disease    Ex-smoker    Obesity (BMI 30-39. 9)    Recurrent pleural effusion on right    Pleural effusion    Hypoxia    AMS (altered mental status)    Sleep related hypoxia    Stage 3 chronic kidney disease (HCC)    Bacteremia due to methicillin resistant Staphylococcus epidermidis       Active Problems  Principal Problem:    AMS (altered mental status)  Active Problems:    Stage 3 chronic kidney disease (HCC)    Bacteremia due to methicillin resistant Staphylococcus epidermidis    Decompensation of cirrhosis of liver (HCC)    Septicemia (HCC)  Resolved Problems:    * No resolved hospital problems. *    Electronically signed by: Electronically signed by [facility]IND[facility] Nicholson CNP on 1/10/2023 at 11:14 AM

## 2023-01-10 NOTE — PROGRESS NOTES
No complaints  Abdomen soft, non-tender, non-distended    IMPRESSION:  1) decompensated cirrhosis- due to prior steatosis (alcohol and metabolic)- complicated by esoph varices (have never bled), chylous ascites, hepatic hydrothorax (chylous), PSE  2) gram pos bacteremia- source not apparent- doubt due to SBP as neutrophil count only 13- ascitic culture negative so far  3) chylous ascites and pleural effusion- most likely due to portal hypertension from cirrhosis- reasonably managed with regularly scheduled paracentesis every 2 weeks  2) stage 1-2 PSE despite normal ammonia level- possibly aggravated by infection (bacteremia)     Plan:              1) antibiotics per ID              2) from GI standpoint ready for discharge   3) continue with regular paracenteses every 2 weeks              4) wife has been encouraged to have him keep follow up at [address] [place] [address] hepatology- not a TIPS candidate due to PSE but transplant has not been ruled out

## 2023-01-10 NOTE — CONSULTS
[address] [facility]  consulted by Dr. Janes Estevez for monitoring and adjustment. Indication for treatment: Vancomycin indication: Bacteremia  Goal trough: Trough Goal: 15-20 mcg/mL  AUC/TRES: 400-600    Risk Factors for MRSA Identified:   Hospitalization within the past 90 days    Pertinent Laboratory Values:   Temp Readings from Last 3 Encounters:   01/10/23 97.8 °F (36.6 °C) (Oral)   12/28/22 98.1 °F (36.7 °C) (Temporal)   12/14/22 97.8 °F (36.6 °C) (Temporal)     Recent Labs     01/08/23  1012   WBC 8.1       Recent Labs     01/08/23  0201 01/09/23  0553 01/10/23  0402   BUN 26* 25* 26*   CREATININE 1.4* 1.3 1.5*       Estimated Creatinine Clearance: 48 mL/min (A) (based on SCr of 1.5 mg/dL (H)). Intake/Output Summary (Last 24 hours) at 1/10/2023 0946  Last data filed at 1/10/2023 0806  Gross per 24 hour   Intake --   Output 900 ml   Net -900 ml         Pertinent Cultures:   Date    Source    Results  1/4   Blood (4 of 4)   GPC, S. Epi  1/5   Body fluid (Ascitic fluid) Negative  1/6   Blood                                      NGTD (4d)    Vancomycin level:   TROUGH:  No results for input(s): VANCOTROUGH in the last 72 hours. RANDOM:    Recent Labs     01/08/23  0201 01/10/23  0402   VANCORANDOM 21.5 23.9         Assessment:  HPI: Dolores Mg is a 78 y.o. male  who presents with AMS (altered mental status). Hepatic encephalopathy, patient has history of alcoholic cirrhosis. Blood culture 4/4 GPC, now ID'd as S epi. Repeated cultures are NGTD @ 4 days. Ascitic fluid Negative  SCr, BUN, and urine output:   SCr  trending upward @ 1.5 (baseline 1.0-1.1) on admission  Day(s) of therapy: 5  Continue for 3 days beyond finalized neg- repeat cultures per ID. Repeat Blood cultures with NGTD x 4 days.   Vancomycin concentration:   1/8 - 21.5, collected 11.5h post-dose,   1/10 - 23.2, 11 hours post-dose ()    Plan:  Continue vancomycin 1250 mg IVPB Q24h with therapeutic trough and  at steady state  Repeat level in 2 days  Continue vancomycin therapy for 3 days beyond finalized repeat blood cultures per ID.   Pharmacy will continue to monitor patient and adjust therapy as indicated    Rogelio 3 1/12 @ 0600    Thank you for the consult,  Ethan Bateman, [facility]  1/10/2023 9:46 AM

## 2023-01-10 NOTE — PROGRESS NOTES
V2.0  [facility] Hospitalist Progress Note      Name:  Hussain Muir /Age/Sex: 1945  (78 y.o. male)   MRN & CSN:  [id number] & [id number] Encounter Date/Time: 1/10/2023 2:51 PM EST    Location:  [address] [place], [place] [address] PCP: Armando Rasmussen MD       Hospital Day: 7    Assessment and Plan:   Hussain Muir is a 78 y.o. male  who presents with AMS (altered mental status)    1. Hepatic encephalopathy  -Patient has history of alcoholic cirrhosis  -Status post paracentesis  -Resume Xifaxan 550 mg twice daily  -Head CT negative for any acute intracranial process  -GI consulted and following    2. Hepatic cirrhosis  -Secondary to alcoholic liver disease  -History of varices but no bleed  -Follows up with GI  -Recommended to follow-up with hepatology at [address] [place] [address] and deemed not a candidate for TIPS  -Resume home medications nadolol/spironolactone/Lasix    3. Ascites  -Status post paracentesis  -History of multiple paracentesis  -Ascitic fluid WBC  238  -Continue to follow  -Continue diuretics    4. Right hydrothorax  -Status post right thoracentesis with removal of 2000 cc of fluid    5. Hypertension  -Resume home medications    6. Hypothyroidism  -Resume Synthroid    7. Bacteremia?  -Blood cultures bottle 1 4 out of 4 positive for staph epi. Second bottle 3 out of 4 positive  -Repeat cultures ordered  -Continue vancomycin  -Cardiology consulted for EVA. EVA did not show any vegetation  -ID consulted. Follow-up with further recommendations              Diet ADULT DIET;  Regular   DVT Prophylaxis [] Lovenox, []  Heparin, [] SCDs, [] Ambulation,  [] Eliquis, [] Xarelto  [] Coumadin   Code Status Full Code   Disposition From: Home  Expected Disposition: Home  Estimated Date of Discharge: 2023  Patient requires continued admission due to hepatic encephalopathy/bacteremia   Surrogate Decision Maker/ POA      Subjective:     Chief Complaint: Altered Mental Status (Arrived per EMS from home, altered since last night per wife.  Hx of AMS w/ elevated ammonia- some right sided weakness, drowsy during triage)  Patient seen and examined at bedside today. Patient wondering when he will be discharged. Denies any chest pain, SOB, nausea, fever or chills. Objective: Intake/Output Summary (Last 24 hours) at 1/10/2023 0722  Last data filed at 1/10/2023 0153  Gross per 24 hour   Intake --   Output 650 ml   Net -650 ml        Vitals:   Vitals:    01/10/23 0229   BP: 118/61   Pulse: 68   Resp: 26   Temp: 98.4 °F (36.9 °C)   SpO2: 99%       Physical Exam:     General: NAD  Eyes: EOMI  ENT: neck supple  Cardiovascular: Regular rate. Respiratory: CTABL  Gastrointestinal: Soft, non tender  Genitourinary: no suprapubic tenderness  Musculoskeletal: No edema  Skin: warm, dry  Neuro: Alert. Psych: Mood appropriate. Medications:   Medications:    albuterol sulfate HFA  2 puff Inhalation BID    vancomycin  1,250 mg IntraVENous Q24H    pantoprazole  20 mg Oral QAM AC    thiamine (VITAMIN B1) IVPB  250 mg IntraVENous Q24H    Followed by    Guerita Valdes ON 1/11/2023] thiamine  100 mg Oral Daily    rifAXIMin  550 mg Oral BID    sodium chloride flush  5-40 mL IntraVENous 2 times per day    DULoxetine  60 mg Oral Daily    furosemide  40 mg Oral Daily    lactulose  20 g Oral TID    levothyroxine  50 mcg Oral Daily    nadolol  40 mg Oral Daily    spironolactone  100 mg Oral BID    omega-3 acid ethyl esters  2,000 mg Oral Daily      Infusions:    sodium chloride 25 mL (01/09/23 2208)     PRN Meds: melatonin, 3 mg, Nightly PRN  albuterol sulfate HFA, 2 puff, Q4H PRN  sodium chloride flush, 5-40 mL, PRN  sodium chloride, , PRN  ondansetron, 4 mg, Q8H PRN   Or  ondansetron, 4 mg, Q6H PRN      Labs      Recent Results (from the past 24 hour(s))   Vancomycin Level, Random    Collection Time: 01/10/23  4:02 AM   Result Value Ref Range    Vancomycin Rm 23.9 UG/ML    DOSE AMOUNT DOSE AMT.  GIVEN - `     DOSE TIME DOSE TIME GIVEN - `    Renal Function Panel Collection Time: 01/10/23  4:02 AM   Result Value Ref Range    Sodium 135 135 - 145 MMOL/L    Potassium 4.8 3.5 - 5.1 MMOL/L    Chloride 99 99 - 110 mMol/L    CO2 27 21 - 32 MMOL/L    Anion Gap 9 4 - 16    BUN 26 (H) 6 - 23 MG/DL    Creatinine 1.5 (H) 0.9 - 1.3 MG/DL    Est, Glom Filt Rate 48 (L) >60 mL/min/1.73m2    Glucose 94 70 - 99 MG/DL    Calcium 9.8 8.3 - 10.6 MG/DL    Albumin 2.7 (L) 3.4 - 5.0 GM/DL    Phosphorus 3.3 2.5 - 4.9 MG/DL   C-Reactive Protein    Collection Time: 01/10/23  4:02 AM   Result Value Ref Range    CRP High Sensitivity 27.2 (H) <5.0 mg/L   Procalcitonin    Collection Time: 01/10/23  4:02 AM   Result Value Ref Range    Procalcitonin 0.098         Imaging/Diagnostics Last 24 Hours   CT HEAD WO CONTRAST    Result Date: 1/4/2023  EXAMINATION: CT OF THE HEAD WITHOUT CONTRAST  1/4/2023 12:44 pm TECHNIQUE: CT of the head was performed without the administration of intravenous contrast. Automated exposure control, iterative reconstruction, and/or weight based adjustment of the mA/kV was utilized to reduce the radiation dose to as low as reasonably achievable. COMPARISON: 11/12/2022, multiple prior head CTs dating back to 02/11/2022 HISTORY: ORDERING SYSTEM PROVIDED HISTORY: ams TECHNOLOGIST PROVIDED HISTORY: Reason for exam:->ams Has a \"code stroke\" or \"stroke alert\" been called? ->No Decision Support Exception - unselect if not a suspected or confirmed emergency medical condition->Emergency Medical Condition (MA) Reason for Exam: ams FINDINGS: BRAIN/VENTRICLES: There is no acute intracranial hemorrhage, mass effect or midline shift. No abnormal extra-axial fluid collection. The gray-white differentiation is maintained without evidence of an acute infarct. There is no evidence of hydrocephalus.  Mild cortical atrophy, white matter changes consistent with microvascular degenerative disease, atherosclerotic calcification in the distal internal carotid arteries bilaterally and distal right vertebral artery uncertain hemodynamic significance. ORBITS: The visualized portion of the orbits demonstrate no acute abnormality. SINUSES: The visualized paranasal sinuses and mastoid air cells demonstrate no acute abnormality. SOFT TISSUES/SKULL:  No acute abnormality of the visualized skull or soft tissues. No noncontrast CT evidence of acute intracranial pathology. No CT etiology for patient's clinical complaint of altered mental status noted on this study. Cortical atrophy, white matter changes consistent with microvascular degenerative disease, atherosclerotic calcification of the distal internal carotid and right vertebral arteries of uncertain hemodynamic significance     XR CHEST PORTABLE    Result Date: 1/4/2023  EXAMINATION: ONE XRAY VIEW OF THE CHEST 1/4/2023 12:42 pm COMPARISON: 12/09/2022 HISTORY: ORDERING SYSTEM PROVIDED HISTORY: Chest Pain TECHNOLOGIST PROVIDED HISTORY: Reason for exam:->Chest Pain Reason for Exam: chest pain FINDINGS: Heart size stable. Left lung clear. Similar appearing pleural and parenchymal opacities throughout the right hemithorax. No pneumothorax. There appears to be a persistent right pleural fluid and parenchymal opacification the right lung suggesting atelectasis     IR US GUIDED PARACENTESIS    Result Date: 1/5/2023  PROCEDURE: PARACENTESIS WITHOUT IMAGE GUIDANCE US ABDOMEN LIMITED 1/5/2023 HISTORY: ORDERING SYSTEM PROVIDED HISTORY: us- guided paracentesis TECHNOLOGIST PROVIDED HISTORY: Remove as much fluid as possible Please send fluid for: cell count and diff, culture and sensitivity, total protein, albumin Please give 6-8 grams albumin IV for each liter fluid removed Please innoculate fluid immediately into aerobic and non-aerobic blood culture bottles for the culture specimen Reason for exam:->us- guided paracentesis TECHNIQUE: Maximum sterile barrier technique including hand hygiene, skin prep and sterile ultrasound technique utilized for procedure. Sterile ultrasound technique also utilized for procedure Ultrasound guidance required for procedure to confirm presence of ascites, puncture site selection, real-time intra procedural guidance. Images made for patient's medical file. Informed consent was obtained after a detailed explanation of the procedure including risks, benefits, and alternatives. Universal protocol was followed. A limited ultrasound of the abdomen was performed. The right abdomen was prepped and draped in sterile fashion and local anesthesia was achieved with lidocaine. An 6 Nigerian needle sheath was advanced into ascites and paracentesis was performed. The patient tolerated the procedure well. FINDINGS: Limited ultrasound of the abdomen demonstrates ascites with the paracentesis catheter within it. A total of 4620 mL cloudy white chylous appearing fluid was removed. 1020 mL sample sent for laboratory evaluation. 25 g intravenous albumin utilized for procedure. No complication suggested. Successful ultrasound-guided paracentesis with specimen sent for laboratory evaluation. CTA HEAD NECK W CONTRAST    Result Date: 1/4/2023  EXAMINATION: CTA OF THE HEAD AND NECK WITH CONTRAST 1/4/2023 5:09 pm: TECHNIQUE: CTA of the head and neck was performed with the administration of intravenous contrast. Multiplanar reformatted images are provided for review. MIP images are provided for review. Stenosis of the internal carotid arteries measured using NASCET criteria. Automated exposure control, iterative reconstruction, and/or weight based adjustment of the mA/kV was utilized to reduce the radiation dose to as low as reasonably achievable. COMPARISON: None. HISTORY: ORDERING SYSTEM PROVIDED HISTORY: ams TECHNOLOGIST PROVIDED HISTORY: Reason for exam:->ams Has a \"code stroke\" or \"stroke alert\" been called? ->No Decision Support Exception - unselect if not a suspected or confirmed emergency medical condition->Emergency Medical Condition (MA) Reason for Exam: ams Additional signs and symptoms: no Relevant Medical/Surgical History: 80 ml isovue 370 used FINDINGS: CTA NECK: AORTIC ARCH/ARCH VESSELS: No dissection or arterial injury. No significant stenosis of the brachiocephalic or subclavian arteries. CAROTID ARTERIES: No dissection, arterial injury, or hemodynamically significant stenosis by NASCET criteria. VERTEBRAL ARTERIES: No dissection, arterial injury, or significant stenosis. SOFT TISSUES: There is a large right pleural effusion with a partially visualized mass within the right upper lobe measuring at least 6.6 x 5.8 cm in diameter. Mild mediastinal adenopathy is also noted. BONES: No acute osseous abnormality. CTA HEAD: ANTERIOR CIRCULATION: The anterior and middle cerebral arteries demonstrate normal arborization patterns. There is asymmetric narrowing and attenuation of the left MCA and branch vessels. No aneurysm. POSTERIOR CIRCULATION: No significant stenosis of the vertebral, basilar, or posterior cerebral arteries. No aneurysm. OTHER: No dural venous sinus thrombosis on this non-dedicated study. BRAIN: See separately dictated noncontrast head CT report. No large vessel occlusion visualized within the head or neck. Asymmetric narrowing and attenuation of the left MCA and branch vessels. Recommend digital subtraction angiography for further evaluation. Incidentally noted pleural effusion and partially visualized right upper lobe mass. Please refer to the separately dictated CT chest report for further details and management recommendations. CT CHEST ABDOMEN PELVIS W CONTRAST Additional Contrast? None    Result Date: 1/4/2023  EXAMINATION: CT OF THE CHEST, ABDOMEN, AND PELVIS WITH CONTRAST 1/4/2023 5:11 pm TECHNIQUE: CT of the chest, abdomen and pelvis was performed with the administration of intravenous contrast. Multiplanar reformatted images are provided for review.  Automated exposure control, iterative reconstruction, and/or weight based adjustment of the mA/kV was utilized to reduce the radiation dose to as low as reasonably achievable. COMPARISON: September 13, 2021 HISTORY: ORDERING SYSTEM PROVIDED HISTORY: hx of liver cirrhosis TECHNOLOGIST PROVIDED HISTORY: Reason for exam:->hx of liver cirrhosis Additional Contrast?->None Reason for Exam: hx of liver cirrhosis. sob Additional signs and symptoms: no Relevant Medical/Surgical History: 80 ml isovue 370 used FINDINGS: Chest: Mediastinum: Thoracic aorta is nonaneurysmal.  No evidence of dissection. Heart and pericardium appear unremarkable. Scattered mediastinal nodes but no evidence of significant lymphadenopathy. Largest paratracheal node measures up to 8.1 mm. Main pulmonary artery is enlarged measuring 3.2 cm but no evidence of acute pulmonary embolism. Lungs/pleura: A large right pleural effusion is noted. 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 Atelectatic changes seen in the right upper and right lower lobes with collapse of the entire right lower lobe. Left lung demonstrates no active infiltrates. Minimal atelectatic changes. Soft Tissues/Bones: No acute abnormality. Abdomen/Pelvis: Organs: The liver demonstrates severe cirrhosis with nodularity and shrinkage. No focal masses. Gallbladder is not seen and presumably removed. Pancreas and spleen appear unremarkable. No evidence of splenomegaly at this time. Adrenals, kidneys, aorta and IVC appear stable. Aorta is calcified but nonaneurysmal. GI/Bowel: No evidence of bowel obstruction or perforation. Mild constipation and stool impaction in the rectum. Small bowel appears unremarkable. No evidence of significant varices. Pelvis: Urinary bladder, prostate and seminal vesicles appear stable. Scattered bilateral pelvic nodes which are not significantly enlarged. Left inguinal hernia containing ascitic fluid. Peritoneum/Retroperitoneum: Moderate ascites and mesenteric congestion.  Retroperitoneal lymphadenopathy with nodes measuring up to 1.4 cm in the aortocaval region. This was previously seen as well. Bones/Soft Tissues: No acute abnormality. 1. Large right pleural effusion and atelectatic changes in the right upper and right lower lobe with significantly compromised aeration in the right lung. 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 Minimal atelectatic changes left lower lobe. 2. Prominent main pulmonary artery measuring 3.2 cm but no acute pulmonary embolism. Aorta is nonaneurysmal and there is no evidence of dissection. 3. Moderate ascites in the abdomen and pelvis. Mesenteric congestion. 4. Hepatic cirrhosis with nodularity and some shrinkage but no focal abnormality. 5. Redemonstration of retroperitoneal lymphadenopathy. 6. Left inguinal hernia containing ascitic fluid.        Electronically signed by Radha Rogers MD on 1/10/2023 at 7:22 AM

## 2023-01-11 VITALS
OXYGEN SATURATION: 93 % | DIASTOLIC BLOOD PRESSURE: 70 MMHG | RESPIRATION RATE: 15 BRPM | SYSTOLIC BLOOD PRESSURE: 106 MMHG | HEIGHT: 68 IN | TEMPERATURE: 98.2 F | HEART RATE: 67 BPM | WEIGHT: 227 LBS | BODY MASS INDEX: 34.4 KG/M2

## 2023-01-11 LAB
C-REACTIVE PROTEIN, HIGH SENSITIVITY: 34.3 MG/L
CULTURE: NORMAL
Lab: NORMAL
SPECIMEN: NORMAL

## 2023-01-11 PROCEDURE — 2580000003 HC RX 258: Performed by: NURSE PRACTITIONER

## 2023-01-11 PROCEDURE — 36415 COLL VENOUS BLD VENIPUNCTURE: CPT

## 2023-01-11 PROCEDURE — 99231 SBSQ HOSP IP/OBS SF/LOW 25: CPT | Performed by: NURSE PRACTITIONER

## 2023-01-11 PROCEDURE — 86140 C-REACTIVE PROTEIN: CPT

## 2023-01-11 PROCEDURE — 6370000000 HC RX 637 (ALT 250 FOR IP): Performed by: NURSE PRACTITIONER

## 2023-01-11 PROCEDURE — 6370000000 HC RX 637 (ALT 250 FOR IP): Performed by: SPECIALIST

## 2023-01-11 RX ORDER — LANOLIN ALCOHOL/MO/W.PET/CERES
100 CREAM (GRAM) TOPICAL DAILY
Qty: 30 TABLET | Refills: 3 | Status: SHIPPED | OUTPATIENT
Start: 2023-01-12

## 2023-01-11 RX ORDER — DOXYCYCLINE HYCLATE 100 MG
100 TABLET ORAL EVERY 12 HOURS SCHEDULED
Qty: 5 TABLET | Refills: 0 | Status: SHIPPED | OUTPATIENT
Start: 2023-01-11 | End: 2023-01-14

## 2023-01-11 RX ORDER — DOXYCYCLINE HYCLATE 100 MG
100 TABLET ORAL EVERY 12 HOURS SCHEDULED
Status: DISCONTINUED | OUTPATIENT
Start: 2023-01-11 | End: 2023-01-11 | Stop reason: HOSPADM

## 2023-01-11 RX ADMIN — LACTULOSE 20 G: 20 SOLUTION ORAL at 09:05

## 2023-01-11 RX ADMIN — PANTOPRAZOLE SODIUM 20 MG: 20 TABLET, DELAYED RELEASE ORAL at 05:55

## 2023-01-11 RX ADMIN — DOXYCYCLINE HYCLATE 100 MG: 100 TABLET, COATED ORAL at 11:13

## 2023-01-11 RX ADMIN — RIFAXIMIN 550 MG: 550 TABLET ORAL at 09:05

## 2023-01-11 RX ADMIN — FUROSEMIDE 40 MG: 40 TABLET ORAL at 09:05

## 2023-01-11 RX ADMIN — LEVOTHYROXINE SODIUM 50 MCG: 0.05 TABLET ORAL at 05:55

## 2023-01-11 RX ADMIN — NADOLOL 40 MG: 20 TABLET ORAL at 09:05

## 2023-01-11 RX ADMIN — Medication 100 MG: at 09:05

## 2023-01-11 RX ADMIN — OMEGA-3-ACID ETHYL ESTERS 2000 MG: 1 CAPSULE, LIQUID FILLED ORAL at 09:05

## 2023-01-11 RX ADMIN — DULOXETINE HYDROCHLORIDE 60 MG: 30 CAPSULE, DELAYED RELEASE ORAL at 09:05

## 2023-01-11 RX ADMIN — SPIRONOLACTONE 100 MG: 50 TABLET ORAL at 09:05

## 2023-01-11 RX ADMIN — SODIUM CHLORIDE, PRESERVATIVE FREE 10 ML: 5 INJECTION INTRAVENOUS at 09:05

## 2023-01-11 ASSESSMENT — PAIN SCALES - GENERAL: PAINLEVEL_OUTOF10: 0

## 2023-01-11 NOTE — PROGRESS NOTES
Outpatient Pharmacy Progress Note for Meds-to-Beds    Total number of Prescriptions Filled: 3  The following medications were dispensed to the patient during the discharge process:  Doxycycline  Thiamine     Additional Documentation:  Patient's family member picked-up the medication(s) in the OP Pharmacy (wife)  Patient stated that he has Xifaximin at home and did not pick this up. Thank you for letting us serve your patients.   [address] [facility]    [address] [address], [address] [facility]    Phone: [phone]    Fax: [phone]

## 2023-01-11 NOTE — PROGRESS NOTES
Infectious Disease Progress Note  2023   Patient Name: Nora Gonzáles : 1945   Impression  Staphylococcus epidermidis and CoNS in Blood Cultures: Pathogen vs Contaminant  Persistent Large Right Pleural Effusion:  Alcoholic Cirrhosis with Ascites and Possible Wernicke's Encephalopathy:  Afebrile, no leukocytosis  Patient has clinically improved, Pct and CRP have remained low. -Rapid influenza A/B negative  -COVID-19 negative  -BC  Staphylococcus epidermidis,  CoNS   BC 0-NGTD  1/10- BC 02-NGTD  -S/p per IR Dr. Latanya Heart paracentesis: removal of 4620 cc clear ascitic fluid. Cultures: NGTD. Fluid analysis: glucose 135, LD 46, RBC 34, lymphocytes 47, monocytes 47, neutrophils 6, PH 7.5, protein 1.0, , amylase 38, albumin 0.4, mesothelial 5. -CT Chest A&P W Contrast: 1. Large right pleural effusion and atelectatic changes in the right upper   and right lower lobe with significantly compromised aeration in the right   lung. 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 Minimal atelectatic   changes left lower lobe. 2. Prominent main pulmonary artery measuring 3.2 cm but no acute pulmonary   embolism. Aorta is nonaneurysmal and there is no evidence of dissection. 3. Moderate ascites in the abdomen and pelvis. Mesenteric congestion. 4. Hepatic cirrhosis with nodularity and some shrinkage but no focal   abnormality. 5. Redemonstration of retroperitoneal lymphadenopathy. 6. Left inguinal hernia containing ascitic fluid. -CXR: Persistent right pleural effusion and airspace disease in the right upper   lung field and right lung base. No significant change from the prior study. Follow up to resolution is suggested.    -S/p per IR, Dr. Latanya Heart, Right Thoracentesis, removal of 2,000 cc cloudy white fluid, fluid analysis: Not available  Dr. Ava Peres, GI, onboard, rec follow up regularly with hepatology at [address] [place] [address] (MELD is too low for transplant and not a good TIPS candidate). H/o varices wo bleeding. 1/9-EVA: no evidence of endocarditis  JENNIFER:  HTN:  HFpEF:  Hearing Impaired:  Pulmonary Nodules:  Obesity: BMI: 32.66  Multi-Morbidity: per PMHx:Anxiety, alcoholic cirrhosis, GERD, HLD, HTN, portal HTN, pulmonary nodules, JENNIFER, choley     Plan:  DC IV vancomycin  Start po doxycycline 100 mg bid x 3 days - will treat 3 days past negative BC, (end date 1/14/2023), as had had a cumulative course of IV vancomycin 5 days, and first set of repeat BC on 1/6/2023 had only one set obtained which show 0/1 NGTD at 5 days, repeat set show 0/2 NGTD at 24 hours, patient is quite persistent to go home, he verbally voices understanding IDSA guidelines states must show NGTD at 48 x 2 sets. He is willing to go home with the po doxycycline. DW patient and his wife, they both voice understanding of the plan of care  Follow up with PCP after DC  OK from ID standpoint to DC when ready    Ongoing Antimicrobial Therapy  Doxycycline 1/11-  Completed Antimicrobial Therapy  Vancomycin 1/6-11? History:? Interval history noted. Chief complaint:   Staphylococcus epidermidis bacteremia. Denies n/v/d/f or untoward effects of antibiotics. States is feeling well and wanting to go home today. Physical Exam:  Vital Signs: /65   Pulse 64   Temp 97.8 °F (36.6 °C) (Oral)   Resp 21   Ht 5' 8\" (1.727 m)   Wt 227 lb (103 kg)   SpO2 100%   BMI 34.52 kg/m²     Gen: remains alert and oriented, no distress, up in the chair. Chest: no distress and CTA. Good air movement. Room air. Heart: NSR depressed T wave,  and no MRG. Abd: soft, non-distended, no tenderness, no hepatomegaly. Normoactive bowel sounds. Ext: no clubbing, cyanosis, or edema  Neuro: Mental status intact.  CN 2-12 intact and no focal sensory or motor deficits     Radiologic / Imaging / TESTING  1/4/2023 XR Chest Portable:  Impression   There appears to be a persistent right pleural fluid and parenchymal   opacification the right lung suggesting atelectasis      1/4/2023 CT Head WO Contrast:  Impression   No noncontrast CT evidence of acute intracranial pathology. No CT etiology   for patient's clinical complaint of altered mental status noted on this study. Cortical atrophy, white matter changes consistent with microvascular   degenerative disease, atherosclerotic calcification of the distal internal   carotid and right vertebral arteries of uncertain hemodynamic significance      1/4/2023 CTA Head Neck W Contrast:  Impression   No large vessel occlusion visualized within the head or neck. Asymmetric narrowing and attenuation of the left MCA and branch vessels. Recommend digital subtraction angiography for further evaluation. Incidentally noted pleural effusion and partially visualized right upper lobe   mass. Please refer to the separately dictated CT chest report for further   details and management recommendations. 1/4/2023 CT Chest Abdomen Pelvis W Contrast:   Impression   1. Large right pleural effusion and atelectatic changes in the right upper   and right lower lobe with significantly compromised aeration in the right   lung. 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 Minimal atelectatic   changes left lower lobe. 2. Prominent main pulmonary artery measuring 3.2 cm but no acute pulmonary   embolism. Aorta is nonaneurysmal and there is no evidence of dissection. 3. Moderate ascites in the abdomen and pelvis. Mesenteric congestion. 4. Hepatic cirrhosis with nodularity and some shrinkage but no focal   abnormality. 5. Redemonstration of retroperitoneal lymphadenopathy. 6. Left inguinal hernia containing ascitic fluid. 1/5/2023 IR US Guided Paracentesis:  Impression   Successful ultrasound-guided paracentesis with specimen sent for laboratory   evaluation.       1/6/2023 XR Chest Portable:  Impression   Persistent right pleural effusion and airspace disease in the right upper   lung field and right lung base. No significant change from the prior study. Follow up to resolution is suggested. 1/9/2023 EVA:  Summary   Left ventricular systolic function is normal.   No evidence of thrombus within the left atrial appendage. Negative bubble study; no ASD or PFO noted. Structurally normal aortic valve. Structurally normal mitral valve. Tricuspid valve was not well visualized. No obvious evidence of endocarditis. Labs:    Recent Results (from the past 24 hour(s))   C-Reactive Protein    Collection Time: 01/11/23  3:55 AM   Result Value Ref Range    CRP High Sensitivity 34.3 (H) <5.0 mg/L     CULTURE results: Invalid input(s): BLOOD CULTURE,  URINE CULTURE, SURGICAL CULTURE    Diagnosis:  Patient Active Problem List   Diagnosis    Shortness of breath    History of colonic polyps    Ascites due to alcoholic cirrhosis (HCC)    Mixed hyperlipidemia    Hypertension    History of alcohol abuse    Gastroesophageal reflux disease    Anxiety    Pulmonary nodules    Hyperglycemia    Acquired hypothyroidism    Class 3 severe obesity with body mass index (BMI) of 40.0 to 44.9 in adult [facility])    Bilateral hearing loss    Increased ammonia level    Portal hypertension (HCC)    SOB (shortness of breath)    Decompensation of cirrhosis of liver (HCC)    Secondary esophageal varices without bleeding (HCC)    Gastric polyps    Hearing loss    Septicemia (HCC)    Hepatic encephalopathy    Recurrent right pleural effusion    Acute respiratory failure (HCC)    Acute kidney injury superimposed on chronic kidney disease (HCC)    Generalized weakness    Oropharyngeal dysphagia    Cellulitis of left lower extremity    Type 2 diabetes mellitus without complication, without long-term current use of insulin (HCC)    Thyroid disease    Type 2 diabetes mellitus with chronic kidney disease    Ex-smoker    Obesity (BMI 30-39. 9)    Recurrent pleural effusion on right    Pleural effusion    Hypoxia    AMS (altered mental status)    Sleep related hypoxia    Stage 3 chronic kidney disease ([facility] [address]    Bacteremia due to methicillin resistant Staphylococcus epidermidis       Active Problems  Principal Problem:    AMS (altered mental status)  Active Problems:    Stage 3 chronic kidney disease (HCC)    Bacteremia due to methicillin resistant Staphylococcus epidermidis    Decompensation of cirrhosis of liver (HCC)    Septicemia (HCC)  Resolved Problems:    * No resolved hospital problems. *    Electronically signed by: Electronically signed by MOHINDER Livingston CNP on 1/11/2023 at 10:54 AM

## 2023-01-11 NOTE — DISCHARGE SUMMARY
V2.0  Discharge Summary    Name:  Madonna Morrison /Age/Sex: 1945 (8 y.o. male)   Admit Date: 2023  Discharge Date: 23    MRN & CSN:  [id number] & [id number] Encounter Date and Time 23 11:31 AM EST    Attending:  Rohit Gomes MD Discharging Provider: Rohit Gomes MD       Hospital Course:     Brief HPI:   Madonna Morrison is a 78 y.o. male with pmh of chronic kidney disease, cirrhosis of liver with ascites, esophageal varices who presented to the ED via EMS with complaints of altered mental status. Context this patient has been confused and disoriented since last evening. Patient is unable to provide any significant information in response with tangential information. Patient has a history of cirrhosis with ascites. He follows with Dr. Jason Perez. He also has known chronic pleural effusions and wears home oxygen at 2 L nasal cannula at baseline. Patient's wife stated patient started complaining of being more tired, weak and fatigued last evening. Patient's wife noted he is on lactulose for liver failure but states he had a bowel movement all over the toilet yesterday evening. She states he was not acting like himself yesterday evening. He does get a paracentesis every 2 weeks and his last one was last Wednesday. He also has had a thoracentesis for chronic pleural effusions of the right lung. Brief Problem Based Course:     1. Hepatic encephalopathy  -Patient has history of alcoholic cirrhosis  -Status post paracentesis  -Resume Xifaxan 550 mg twice daily  -Head CT negative for any acute intracranial process  -GI was consulted   -Outpatient follow-up     2. Hepatic cirrhosis  -Secondary to alcoholic liver disease  -History of varices but no bleed  -Follows up with GI  -Recommended to follow-up with hepatology at [facility] and deemed not a candidate for TIPS  -Resume home medications nadolol/spironolactone/Lasix     3.   Ascites  -Status post paracentesis  -History of multiple paracentesis  -Ascitic fluid WBC  238  -Continue diuretics     4. Right hydrothorax  -Status post right thoracentesis with removal of 2000 cc of fluid     5. Hypertension  -Resume home medications     6. Hypothyroidism  -Resume Synthroid     7. Bacteremia?  -Blood cultures bottle 1 4 out of 4 positive for staph epi. Second bottle 3 out of 4 positive  -Repeat cultures ordered  -Continue vancomycin  -Cardiology consulted for EVA. EVA did not show any vegetation  -ID consulted. Will be discharged to complete Doxycyclin 1/14/22    The patient expressed appropriate understanding of, and agreement with the discharge recommendations, medications, and plan. Consults this admission:  IP CONSULT TO GI  IP CONSULT TO INTERVENTIONAL RADIOLOGY  IP CONSULT TO INTERVENTIONAL RADIOLOGY  IP CONSULT TO INTERVENTIONAL RADIOLOGY  IP CONSULT TO CARDIOLOGY  IP CONSULT TO INFECTIOUS DISEASES    Discharge Diagnosis:   AMS (altered mental status)        Discharge Instruction:   Follow up appointments:   Primary care physician: Alesha Suero MD within 2 weeks  Diet: regular diet   Activity: activity as tolerated  Disposition: Discharged to:   [x]Home, [][facility], []SNF, []Acute Rehab, []Hospice   Condition on discharge: Stable  Labs and Tests to be Followed up as an outpatient by PCP or Specialist:     Discharge Medications:        Medication List        START taking these medications      doxycycline hyclate 100 MG tablet  Commonly known as: VIBRA-TABS  Take 1 tablet by mouth every 12 hours for 5 doses     rifAXIMin 550 MG tablet  Commonly known as: XIFAXAN  Take 1 tablet by mouth 2 times daily     thiamine 100 MG tablet  Take 1 tablet by mouth daily  Start taking on: January 12, 2023            CHANGE how you take these medications      nadolol 40 MG tablet  Commonly known as: CORGARD  Take 1 tablet by mouth in the morning.   What changed: when to take this            CONTINUE taking these medications      albuterol sulfate  (90 Base) MCG/ACT inhaler  Commonly known as: PROVENTIL;VENTOLIN;PROAIR  INHALE 2 PUFFS BY MOUTH 4 TIMES DAILY AS NEEDED FOR WHEEZING     betamethasone valerate 0.1 % lotion  Commonly known as: VALISONE  Apply topically 2 times daily prn scalp rash     DULoxetine 60 MG extended release capsule  Commonly known as: CYMBALTA  TAKE 1 CAPSULE BY MOUTH ONCE DAILY     ESOMEPRAZOLE MAGNESIUM PO     furosemide 40 MG tablet  Commonly known as: LASIX  Take 1 tablet by mouth daily     lactulose 10 GM/15ML solution  Commonly known as: CHRONULAC  Take 30 mLs by mouth 3 times daily Hold if having diarrhea     levothyroxine 50 MCG tablet  Commonly known as: SYNTHROID  Take 1 tablet by mouth Daily     MULTI VITAMIN MENS PO     Omega-3 1000 MG Caps     spironolactone 100 MG tablet  Commonly known as: ALDACTONE               Where to Get Your Medications        These medications were sent to [address] [place] [address], [address] [facility]      Phone: [phone]   doxycycline hyclate 100 MG tablet  rifAXIMin 550 MG tablet  thiamine 100 MG tablet        Objective Findings at Discharge:   /65   Pulse 64   Temp 97.8 °F (36.6 °C) (Oral)   Resp 21   Ht 5' 8\" (1.727 m)   Wt 227 lb (103 kg)   SpO2 100%   BMI 34.52 kg/m²       Physical Exam:   General: NAD  Eyes: EOMI  ENT: neck supple  Cardiovascular: Regular rate. Respiratory: Clear to auscultation  Gastrointestinal: Soft, non tender  Genitourinary: no suprapubic tenderness  Musculoskeletal: No edema  Skin: warm, dry  Neuro: Alert. Psych: Mood appropriate.          Labs and Imaging   CT HEAD WO CONTRAST    Result Date: 1/4/2023  EXAMINATION: CT OF THE HEAD WITHOUT CONTRAST  1/4/2023 12:44 pm TECHNIQUE: CT of the head was performed without the administration of intravenous contrast. Automated exposure control, iterative reconstruction, and/or weight based adjustment of the mA/kV was utilized to reduce the radiation dose to as low as reasonably achievable. COMPARISON: 11/12/2022, multiple prior head CTs dating back to 02/11/2022 HISTORY: ORDERING SYSTEM PROVIDED HISTORY: ams TECHNOLOGIST PROVIDED HISTORY: Reason for exam:->ams Has a \"code stroke\" or \"stroke alert\" been called? ->No Decision Support Exception - unselect if not a suspected or confirmed emergency medical condition->Emergency Medical Condition (MA) Reason for Exam: ams FINDINGS: BRAIN/VENTRICLES: There is no acute intracranial hemorrhage, mass effect or midline shift. No abnormal extra-axial fluid collection. The gray-white differentiation is maintained without evidence of an acute infarct. There is no evidence of hydrocephalus. Mild cortical atrophy, white matter changes consistent with microvascular degenerative disease, atherosclerotic calcification in the distal internal carotid arteries bilaterally and distal right vertebral artery uncertain hemodynamic significance. ORBITS: The visualized portion of the orbits demonstrate no acute abnormality. SINUSES: The visualized paranasal sinuses and mastoid air cells demonstrate no acute abnormality. SOFT TISSUES/SKULL:  No acute abnormality of the visualized skull or soft tissues. No noncontrast CT evidence of acute intracranial pathology. No CT etiology for patient's clinical complaint of altered mental status noted on this study.  Cortical atrophy, white matter changes consistent with microvascular degenerative disease, atherosclerotic calcification of the distal internal carotid and right vertebral arteries of uncertain hemodynamic significance     XR CHEST PORTABLE    Result Date: 1/8/2023  EXAMINATION: ONE XRAY VIEW OF THE CHEST 1/6/2023 4:16 pm COMPARISON: 01/04/2023 HISTORY: ORDERING SYSTEM PROVIDED HISTORY: post thoracentesis TECHNOLOGIST PROVIDED HISTORY: Reason for exam:->post thoracentesis Reason for Exam: post thoracentesis Additional signs and symptoms: na Relevant Medical/Surgical History: diabetes, hypertension Initial evaluation FINDINGS: Monitor wires overlie the chest.  The trachea is midline. The cardiac silhouette is unremarkable. The left lung is clear. There is consolidation of the right upper lobe. There is a right pleural effusion and atelectasis or infiltrate in the right lung base. Follow up to resolution is suggested. No obvious pneumothorax. Persistent right pleural effusion and airspace disease in the right upper lung field and right lung base. No significant change from the prior study. Follow up to resolution is suggested. XR CHEST PORTABLE    Result Date: 1/4/2023  EXAMINATION: ONE XRAY VIEW OF THE CHEST 1/4/2023 12:42 pm COMPARISON: 12/09/2022 HISTORY: ORDERING SYSTEM PROVIDED HISTORY: Chest Pain TECHNOLOGIST PROVIDED HISTORY: Reason for exam:->Chest Pain Reason for Exam: chest pain FINDINGS: Heart size stable. Left lung clear. Similar appearing pleural and parenchymal opacities throughout the right hemithorax. No pneumothorax. There appears to be a persistent right pleural fluid and parenchymal opacification the right lung suggesting atelectasis     IR US GUIDED PARACENTESIS    Result Date: 1/5/2023  PROCEDURE: PARACENTESIS WITHOUT IMAGE GUIDANCE US ABDOMEN LIMITED 1/5/2023 HISTORY: ORDERING SYSTEM PROVIDED HISTORY: us- guided paracentesis TECHNOLOGIST PROVIDED HISTORY: Remove as much fluid as possible Please send fluid for: cell count and diff, culture and sensitivity, total protein, albumin Please give 6-8 grams albumin IV for each liter fluid removed Please innoculate fluid immediately into aerobic and non-aerobic blood culture bottles for the culture specimen Reason for exam:->us- guided paracentesis TECHNIQUE: Maximum sterile barrier technique including hand hygiene, skin prep and sterile ultrasound technique utilized for procedure.  Sterile ultrasound technique also utilized for procedure Ultrasound guidance required for procedure to confirm presence of ascites, puncture site selection, real-time intra procedural guidance. Images made for patient's medical file. Informed consent was obtained after a detailed explanation of the procedure including risks, benefits, and alternatives. Universal protocol was followed. A limited ultrasound of the abdomen was performed. The right abdomen was prepped and draped in sterile fashion and local anesthesia was achieved with lidocaine. An 6 Uzbek needle sheath was advanced into ascites and paracentesis was performed. The patient tolerated the procedure well. FINDINGS: Limited ultrasound of the abdomen demonstrates ascites with the paracentesis catheter within it. A total of 4620 mL cloudy white chylous appearing fluid was removed. 1020 mL sample sent for laboratory evaluation. 25 g intravenous albumin utilized for procedure. No complication suggested. Successful ultrasound-guided paracentesis with specimen sent for laboratory evaluation. CTA HEAD NECK W CONTRAST    Result Date: 1/4/2023  EXAMINATION: CTA OF THE HEAD AND NECK WITH CONTRAST 1/4/2023 5:09 pm: TECHNIQUE: CTA of the head and neck was performed with the administration of intravenous contrast. Multiplanar reformatted images are provided for review. MIP images are provided for review. Stenosis of the internal carotid arteries measured using NASCET criteria. Automated exposure control, iterative reconstruction, and/or weight based adjustment of the mA/kV was utilized to reduce the radiation dose to as low as reasonably achievable. COMPARISON: None. HISTORY: ORDERING SYSTEM PROVIDED HISTORY: ams TECHNOLOGIST PROVIDED HISTORY: Reason for exam:->ams Has a \"code stroke\" or \"stroke alert\" been called? ->No Decision Support Exception - unselect if not a suspected or confirmed emergency medical condition->Emergency Medical Condition (MA) Reason for Exam: ams Additional signs and symptoms: no Relevant Medical/Surgical History: 80 ml isovue 370 used FINDINGS: CTA NECK: AORTIC ARCH/ARCH VESSELS: No dissection or arterial injury. No significant stenosis of the brachiocephalic or subclavian arteries. CAROTID ARTERIES: No dissection, arterial injury, or hemodynamically significant stenosis by NASCET criteria. VERTEBRAL ARTERIES: No dissection, arterial injury, or significant stenosis. SOFT TISSUES: There is a large right pleural effusion with a partially visualized mass within the right upper lobe measuring at least 6.6 x 5.8 cm in diameter. Mild mediastinal adenopathy is also noted. BONES: No acute osseous abnormality. CTA HEAD: ANTERIOR CIRCULATION: The anterior and middle cerebral arteries demonstrate normal arborization patterns. There is asymmetric narrowing and attenuation of the left MCA and branch vessels. No aneurysm. POSTERIOR CIRCULATION: No significant stenosis of the vertebral, basilar, or posterior cerebral arteries. No aneurysm. OTHER: No dural venous sinus thrombosis on this non-dedicated study. BRAIN: See separately dictated noncontrast head CT report. No large vessel occlusion visualized within the head or neck. Asymmetric narrowing and attenuation of the left MCA and branch vessels. Recommend digital subtraction angiography for further evaluation. Incidentally noted pleural effusion and partially visualized right upper lobe mass. Please refer to the separately dictated CT chest report for further details and management recommendations. CT CHEST ABDOMEN PELVIS W CONTRAST Additional Contrast? None    Result Date: 1/4/2023  EXAMINATION: CT OF THE CHEST, ABDOMEN, AND PELVIS WITH CONTRAST 1/4/2023 5:11 pm TECHNIQUE: CT of the chest, abdomen and pelvis was performed with the administration of intravenous contrast. Multiplanar reformatted images are provided for review.  Automated exposure control, iterative reconstruction, and/or weight based adjustment of the mA/kV was utilized to reduce the radiation dose to as low as reasonably achievable. COMPARISON: September 13, 2021 HISTORY: ORDERING SYSTEM PROVIDED HISTORY: hx of liver cirrhosis TECHNOLOGIST PROVIDED HISTORY: Reason for exam:->hx of liver cirrhosis Additional Contrast?->None Reason for Exam: hx of liver cirrhosis. sob Additional signs and symptoms: no Relevant Medical/Surgical History: 80 ml isovue 370 used FINDINGS: Chest: Mediastinum: Thoracic aorta is nonaneurysmal.  No evidence of dissection. Heart and pericardium appear unremarkable. Scattered mediastinal nodes but no evidence of significant lymphadenopathy. Largest paratracheal node measures up to 8.1 mm. Main pulmonary artery is enlarged measuring 3.2 cm but no evidence of acute pulmonary embolism. Lungs/pleura: A large right pleural effusion is noted. 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 Atelectatic changes seen in the right upper and right lower lobes with collapse of the entire right lower lobe. Left lung demonstrates no active infiltrates. Minimal atelectatic changes. Soft Tissues/Bones: No acute abnormality. Abdomen/Pelvis: Organs: The liver demonstrates severe cirrhosis with nodularity and shrinkage. No focal masses. Gallbladder is not seen and presumably removed. Pancreas and spleen appear unremarkable. No evidence of splenomegaly at this time. Adrenals, kidneys, aorta and IVC appear stable. Aorta is calcified but nonaneurysmal. GI/Bowel: No evidence of bowel obstruction or perforation. Mild constipation and stool impaction in the rectum. Small bowel appears unremarkable. No evidence of significant varices. Pelvis: Urinary bladder, prostate and seminal vesicles appear stable. Scattered bilateral pelvic nodes which are not significantly enlarged. Left inguinal hernia containing ascitic fluid. Peritoneum/Retroperitoneum: Moderate ascites and mesenteric congestion. Retroperitoneal lymphadenopathy with nodes measuring up to 1.4 cm in the aortocaval region.   This was previously seen as well. Bones/Soft Tissues: No acute abnormality. 1. Large right pleural effusion and atelectatic changes in the right upper and right lower lobe with significantly compromised aeration in the right lung. 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 Minimal atelectatic changes left lower lobe. 2. Prominent main pulmonary artery measuring 3.2 cm but no acute pulmonary embolism. Aorta is nonaneurysmal and there is no evidence of dissection. 3. Moderate ascites in the abdomen and pelvis. Mesenteric congestion. 4. Hepatic cirrhosis with nodularity and some shrinkage but no focal abnormality. 5. Redemonstration of retroperitoneal lymphadenopathy. 6. Left inguinal hernia containing ascitic fluid. IR GUIDED THORACENTESIS PLEURAL    Result Date: 1/6/2023  PROCEDURE: ULTRASOUNDGUIDED RIGHT THORACENTESIS 1/6/2023 HISTORY: ORDERING SYSTEM PROVIDED HISTORY: pleural effusion TECHNOLOGIST PROVIDED HISTORY: Reason for exam:->pleural effusion Which side should the procedure be performed?->Right TECHNIQUE: Maximum sterile barrier technique including hand hygiene, skin prep and sterile ultrasound technique utilized for procedure. Sterile ultrasound technique also utilized for procedure Ultrasound guidance required to confirm presence of pleural fluid, puncture site selection, real-time intra procedural guidance. Images made for patient's medical file. Informed consent was obtained after a detailed explanation of the procedure including risks, benefits, and alternatives. Universal protocol was performed. The right chest was prepped and draped in sterile fashion and local anesthesia was achieved with lidocaine. An 5 Yi needle sheath was advanced under ultrasound guidance into pleural effusion and thoracentesis was performed. The patient tolerated the procedure well. FINDINGS: Pre and intraprocedural images demonstrate large amount of right-sided pleural fluid with thoracentesis catheter within it.   A total of 2000 mL cloudy white fluid was removed from the right hemithorax which appears similar to appearance of paracentesis fluid specimens. No complication suggested. Postprocedure chest radiograph ordered. Successful ultrasound guided right thoracentesis as described. CBC: No results for input(s): WBC, HGB, PLT in the last 72 hours. BMP:    Recent Labs     01/09/23  0553 01/10/23  0402    135   K 4.5 4.8   CL 99 99   CO2 26 27   BUN 25* 26*   CREATININE 1.3 1.5*   GLUCOSE 103* 94     Hepatic: No results for input(s): AST, ALT, ALB, BILITOT, ALKPHOS in the last 72 hours. Lipids:   Lab Results   Component Value Date/Time    CHOL 179 01/05/2023 12:06 AM    CHOL 230 10/14/2019 01:45 PM    HDL 51 01/05/2023 12:06 AM    TRIG 73 01/05/2023 12:06 AM     Hemoglobin A1C:   Lab Results   Component Value Date/Time    LABA1C 5.5 12/06/2022 04:17 PM     TSH:   Lab Results   Component Value Date/Time    TSH 1.39 10/14/2019 01:45 PM     Troponin:   Lab Results   Component Value Date/Time    TROPONINT 0.014 01/04/2023 07:00 PM    TROPONINT 0.012 01/04/2023 12:07 PM    TROPONINT <0.010 10/04/2022 11:11 AM     Lactic Acid: No results for input(s): LACTA in the last 72 hours. BNP: No results for input(s): PROBNP in the last 72 hours.   UA:  Lab Results   Component Value Date/Time    NITRU NEGATIVE 01/04/2023 03:50 PM    COLORU YELLOW 01/04/2023 03:50 PM    WBCUA 1 01/04/2023 03:50 PM    RBCUA 1 01/04/2023 03:50 PM    MUCUS RARE 06/22/2022 12:10 PM    TRICHOMONAS NONE SEEN 01/04/2023 03:50 PM    BACTERIA NEGATIVE 01/04/2023 03:50 PM    CLARITYU CLEAR 01/04/2023 03:50 PM    SPECGRAV 1.015 01/04/2023 03:50 PM    LEUKOCYTESUR NEGATIVE 01/04/2023 03:50 PM    UROBILINOGEN 0.2 01/04/2023 03:50 PM    BILIRUBINUR NEGATIVE 01/04/2023 03:50 PM    BLOODU TRACE 01/04/2023 03:50 PM    KETUA NEGATIVE 01/04/2023 03:50 PM     Urine Cultures: No results found for: LABURIN  Blood Cultures: No results found for: BC  No results found for: BLOODCULT2  Organism: No results found for: ORG    Time Spent Discharging patient 35 minutes    Electronically signed by Kei Shankar MD on 1/11/2023 at 11:31 AM

## 2023-01-11 NOTE — PLAN OF CARE
Problem: Discharge Planning  Goal: Discharge to home or other facility with appropriate resources  1/11/2023 1212 by Althea Krueger RN  Outcome: Adequate for Discharge  1/11/2023 0026 by Tona Harrington RN  Outcome: Progressing     Problem: Chronic Conditions and Co-morbidities  Goal: Patient's chronic conditions and co-morbidity symptoms are monitored and maintained or improved  1/11/2023 1212 by Althea Krueger RN  Outcome: Adequate for Discharge  1/11/2023 0026 by Tona Harrington RN  Outcome: Progressing     Problem: Safety - Adult  Goal: Free from fall injury  1/11/2023 1212 by Althea Krueger RN  Outcome: Adequate for Discharge  1/11/2023 0026 by Tona Harrington RN  Outcome: Progressing     Problem: Pain  Goal: Verbalizes/displays adequate comfort level or baseline comfort level  1/11/2023 1212 by Althea Krueger RN  Outcome: Adequate for Discharge  1/11/2023 0026 by Tona Harrington RN  Outcome: Progressing

## 2023-01-15 LAB
CULTURE: NORMAL
CULTURE: NORMAL
Lab: NORMAL
Lab: NORMAL
SPECIMEN: NORMAL
SPECIMEN: NORMAL

## 2023-01-17 NOTE — PROGRESS NOTES
IR Procedure at [facility]:  Unable to reach patient or leave a message will try again later. ADDENDUM: Patient 's wife Zahraa Dumont called back and patient will arrive at 0830 at [facility] on 1/24/2023 for his procedure at 1030. Also went over below instructions. (Paracentesis, Thoracentesis, Thyroid Bx and Injections may have a light breakfast)  2. Follow your directions as prescribed by the doctor for your procedure and medications. 3.   Consult your provider as to when to stop blood thinner  4. Do not take any pain medication within 6 hours of your procedure  5. Do not drink any alcoholic beverages or use any street drugs 24 hours before procedure. 6.   Please wear simple, loose fitting clothing to the hospital.  Do not bring valuables (money,             credit cards, checkbooks, etc.)     7. If you  have a Living Will and Durable Power of  for Healthcare, please bring in a copy. 8.   Please bring picture ID,  insurance card, paperwork from the doctors office            (H & P, Consent,  & card for implantable devices). 9.   Report to the information desk on the ground floor. 10. Take a shower the night before or morning of your procedure, do not apply any lotion, oil or powder. 11. If you are going to be sedated for the procedure, you will need a responsible adult to drive you home.

## 2023-01-23 ENCOUNTER — HOSPITAL ENCOUNTER (OUTPATIENT)
Age: 78
Discharge: HOME | End: 2023-01-23
Payer: COMMERCIAL

## 2023-01-23 ENCOUNTER — HOSPITAL ENCOUNTER (OUTPATIENT)
Dept: GENERAL RADIOLOGY | Age: 78
Discharge: HOME | End: 2023-01-23
Payer: COMMERCIAL

## 2023-01-23 ENCOUNTER — OFFICE VISIT (OUTPATIENT)
Dept: PULMONOLOGY | Age: 78
End: 2023-01-23
Payer: COMMERCIAL

## 2023-01-23 ENCOUNTER — TELEPHONE (OUTPATIENT)
Dept: PULMONOLOGY | Age: 78
End: 2023-01-23

## 2023-01-23 VITALS
BODY MASS INDEX: 36.37 KG/M2 | HEIGHT: 68 IN | OXYGEN SATURATION: 99 % | HEART RATE: 77 BPM | WEIGHT: 240 LBS | RESPIRATION RATE: 16 BRPM

## 2023-01-23 DIAGNOSIS — Z87.891 EX-SMOKER: ICD-10-CM

## 2023-01-23 DIAGNOSIS — J90 RECURRENT PLEURAL EFFUSION ON RIGHT: ICD-10-CM

## 2023-01-23 DIAGNOSIS — E66.01 SEVERE OBESITY (BMI 35.0-39.9) WITH COMORBIDITY (HCC): ICD-10-CM

## 2023-01-23 DIAGNOSIS — R06.02 SHORTNESS OF BREATH: ICD-10-CM

## 2023-01-23 DIAGNOSIS — E66.9 OBESITY (BMI 30-39.9): ICD-10-CM

## 2023-01-23 PROCEDURE — 99213 OFFICE O/P EST LOW 20 MIN: CPT | Performed by: INTERNAL MEDICINE

## 2023-01-23 PROCEDURE — 71046 X-RAY EXAM CHEST 2 VIEWS: CPT

## 2023-01-23 ASSESSMENT — ENCOUNTER SYMPTOMS
EYE ITCHING: 0
ABDOMINAL DISTENTION: 0
COUGH: 0
EYE DISCHARGE: 0
ABDOMINAL PAIN: 0
BACK PAIN: 0
SHORTNESS OF BREATH: 0

## 2023-01-23 NOTE — PROGRESS NOTES
[facility]  1945  Referring Provider:  Guillermo Livingston MD    Subjective:     Chief Complaint   Patient presents with    Follow-up       HPI  Delfino Severin is a 78 y.o. male has come back as a follow. He has Right Hepatic Hydrothorax and gets repeated thoracentesis. His last thoracentesis about 13 days. He is due for the paracentesis tomorrow. He is not on any blood thinners. He has no SOB. He is on Lasix and Spironolactone. Current Outpatient Medications   Medication Sig Dispense Refill    rifAXIMin (XIFAXAN) 550 MG tablet Take 1 tablet by mouth 2 times daily 42 tablet 0    thiamine 100 MG tablet Take 1 tablet by mouth daily 30 tablet 3    furosemide (LASIX) 40 MG tablet Take 1 tablet by mouth daily 60 tablet 3    levothyroxine (SYNTHROID) 50 MCG tablet Take 1 tablet by mouth Daily 90 tablet 1    albuterol sulfate HFA (PROVENTIL;VENTOLIN;PROAIR) 108 (90 Base) MCG/ACT inhaler INHALE 2 PUFFS BY MOUTH 4 TIMES DAILY AS NEEDED FOR WHEEZING 1 each 2    DULoxetine (CYMBALTA) 60 MG extended release capsule TAKE 1 CAPSULE BY MOUTH ONCE DAILY 90 capsule 1    betamethasone valerate (VALISONE) 0.1 % lotion Apply topically 2 times daily prn scalp rash 60 mL 3    spironolactone (ALDACTONE) 100 MG tablet Take 100 mg by mouth 2 times daily      nadolol (CORGARD) 40 MG tablet Take 1 tablet by mouth in the morning. (Patient taking differently: Take 40 mg by mouth nightly) 90 tablet 3    lactulose (CHRONULAC) 10 GM/15ML solution Take 30 mLs by mouth 3 times daily Hold if having diarrhea 2700 mL 5    Multiple Vitamin (MULTI VITAMIN MENS PO) Take 1 tablet by mouth daily      ESOMEPRAZOLE MAGNESIUM PO Take 20 mg by mouth daily OTC      Omega-3 1000 MG CAPS Take 2,000 mg by mouth daily       No current facility-administered medications for this visit.        Allergies   Allergen Reactions    Lisinopril Swelling     Tongue swelling      Zetia [Ezetimibe] Swelling     Facial swelling    Atorvastatin     Codeine Other (See Comments)     Blood pressure drops    Hydrochlorothiazide     Hydroxyzine      Fatigue and depressed    Lexapro [Escitalopram Oxalate]      Increased depression    Pravastatin        Past Medical History:   Diagnosis Date    Anxiety     Cirrhosis, alcoholic ([facility] [address]     Diabetes mellitus ([facility] [address]     GERD (gastroesophageal reflux disease)     GERD (gastroesophageal reflux disease)     Hyperlipidemia     Hypertension     Portal hypertension ([facility])     Pulmonary nodules     Sleep apnea     SOB (shortness of breath)     Thyroid disease        Past Surgical History:   Procedure Laterality Date    APPENDECTOMY      CHOLECYSTECTOMY      COLONOSCOPY      ENDOSCOPY, COLON, DIAGNOSTIC      FOOT SURGERY      needle removed,not sure which foot, per wife. TONSILLECTOMY      UPPER GASTROINTESTINAL ENDOSCOPY N/A 2022    EGD BIOPSY performed by Mishel Puckett MD at [address] [place] [address] History     Socioeconomic History    Marital status:      Spouse name: None    Number of children: None    Years of education: None    Highest education level: None   Occupational History     Comment: Retired    Tobacco Use    Smoking status: Former     Packs/day: 1.00     Years: 14.00     Pack years: 14.00     Types: Cigarettes     Quit date:      Years since quittin.0    Smokeless tobacco: Never   Vaping Use    Vaping Use: Never used   Substance and Sexual Activity    Alcohol use: Not Currently    Drug use: Never    Sexual activity: Yes     Partners: Female     Social Determinants of Health     Financial Resource Strain: Low Risk     Difficulty of Paying Living Expenses: Not hard at all   Food Insecurity: No Food Insecurity    Worried About Running Out of Food in the Last Year: Never true    Ran Out of Food in the Last Year: Never true       Review of Systems   Constitutional:  Negative for fatigue. HENT:  Negative for congestion and postnasal drip.     Eyes:  Negative for discharge and itching. Respiratory:  Negative for cough and shortness of breath. Cardiovascular:  Negative for chest pain and leg swelling. Gastrointestinal:  Negative for abdominal distention and abdominal pain. Endocrine: Negative for cold intolerance and heat intolerance. Genitourinary:  Negative for enuresis and frequency. Musculoskeletal:  Negative for arthralgias and back pain. Allergic/Immunologic: Negative for environmental allergies and food allergies. Neurological:  Negative for light-headedness and headaches. Hematological:  Negative for adenopathy. Psychiatric/Behavioral:  Negative for agitation and behavioral problems. Objective:   Pulse 77   Resp 16   Ht 5' 8\" (1.727 m)   Wt 240 lb (108.9 kg)   SpO2 99%   BMI 36.49 kg/m²   Body mass index is 36.49 kg/m². Sleep Medicine 9/1/2022 9/1/2022 8/15/2022 7/17/2019   Sitting and reading 2 - 2 3   Watching TV 2 - 3 2   Sitting, inactive in a public place (e.g. a theatre or a meeting) 2 - 1 2   As a passenger in a car for an hour without a break 2 - 2 3   Lying down to rest in the afternoon when circumstances permit 3 - 3 3   Sitting and talking to someone 1 - 0 0   Sitting quietly after a lunch without alcohol 2 - 2 3   In a car, while stopped for a few minutes in traffic 0 - 0 0   [place] Sleepiness Score 14 - 13 16   Neck circumference (Inches) 16.5 16.5 16.5 18.25     Mallampati 2    Physical Exam  Vitals reviewed. Constitutional:       Appearance: Normal appearance. HENT:      Head: Normocephalic and atraumatic. Nose: Nose normal.      Mouth/Throat:      Mouth: Mucous membranes are moist.   Eyes:      Extraocular Movements: Extraocular movements intact. Pupils: Pupils are equal, round, and reactive to light. Cardiovascular:      Rate and Rhythm: Normal rate and regular rhythm. Pulses: Normal pulses. Heart sounds: Normal heart sounds.    Pulmonary:      Effort: Pulmonary effort is normal.      Comments: Decreased air entry right base  Abdominal:      General: Abdomen is flat. Palpations: Abdomen is soft. Musculoskeletal:         General: Normal range of motion. Cervical back: Normal range of motion and neck supple. Skin:     General: Skin is warm and dry. Neurological:      General: No focal deficit present. Mental Status: He is alert and oriented to person, place, and time. Psychiatric:         Mood and Affect: Mood normal.         Behavior: Behavior normal.       Radiology: None    Assessment and Plan     Problem List          Respiratory    Recurrent pleural effusion on right      Sec to Hepatic Hydrothorax  C/w diuretics  Thoracentesis on Wednesday           Relevant Orders    XR CHEST (2 VW)    IR THORACENTESIS PLEURAL W IMAGING       Other    Ex-smoker      Advised to c/w quitting smoking         Obesity (BMI 30-39. 9)      Advised to loose weight with diet and exercise           Shortness of breath      Improving with diuretics                 Follow-Up:    Return in about 4 weeks (around 2/20/2023) for chest x ray.      Progress notes sent to the referring Provider    Shaista Owens MD MD  1/23/2023  1:32 PM

## 2023-01-23 NOTE — TELEPHONE ENCOUNTER
Pt already did his XR today. His procedure is not until 2/1/2023. Can you please place a new order.  Thank you

## 2023-01-24 ENCOUNTER — HOSPITAL ENCOUNTER (OUTPATIENT)
Dept: INTERVENTIONAL RADIOLOGY/VASCULAR | Age: 78
Discharge: HOME | End: 2023-01-24
Payer: COMMERCIAL

## 2023-01-24 VITALS
OXYGEN SATURATION: 93 % | TEMPERATURE: 97.8 F | DIASTOLIC BLOOD PRESSURE: 65 MMHG | RESPIRATION RATE: 16 BRPM | SYSTOLIC BLOOD PRESSURE: 124 MMHG | HEART RATE: 83 BPM

## 2023-01-24 DIAGNOSIS — K74.60 CIRRHOSIS OF LIVER WITH ASCITES, UNSPECIFIED HEPATIC CIRRHOSIS TYPE (HCC): ICD-10-CM

## 2023-01-24 DIAGNOSIS — R18.8 CIRRHOSIS OF LIVER WITH ASCITES, UNSPECIFIED HEPATIC CIRRHOSIS TYPE (HCC): ICD-10-CM

## 2023-01-24 DIAGNOSIS — J90 RECURRENT RIGHT PLEURAL EFFUSION: Primary | ICD-10-CM

## 2023-01-24 LAB
FLUID TYPE: NORMAL INDEX
LYMPHOCYTES, BODY FLUID: 13 %
MESOTHELIAL FLUID: 12 /100 WBC
MONOCYTE, FLUID: NORMAL %
NEUTROPHIL, FLUID: 20 %
OTHER CELLS FLUID: NORMAL
RBC FLUID: 78 /CU MM
WBC FLUID: 157 /CU MM

## 2023-01-24 PROCEDURE — 2709999900 IR US GUIDED PARACENTESIS

## 2023-01-24 PROCEDURE — P9047 ALBUMIN (HUMAN), 25%, 50ML: HCPCS

## 2023-01-24 PROCEDURE — 7100000010 HC PHASE II RECOVERY - FIRST 15 MIN

## 2023-01-24 PROCEDURE — 49083 ABD PARACENTESIS W/IMAGING: CPT

## 2023-01-24 PROCEDURE — 87070 CULTURE OTHR SPECIMN AEROBIC: CPT

## 2023-01-24 PROCEDURE — 87205 SMEAR GRAM STAIN: CPT

## 2023-01-24 PROCEDURE — 89051 BODY FLUID CELL COUNT: CPT

## 2023-01-24 PROCEDURE — 6360000002 HC RX W HCPCS

## 2023-01-24 PROCEDURE — 7100000011 HC PHASE II RECOVERY - ADDTL 15 MIN

## 2023-01-24 RX ORDER — ALBUMIN (HUMAN) 12.5 G/50ML
75 SOLUTION INTRAVENOUS ONCE
Status: DISCONTINUED | OUTPATIENT
Start: 2023-01-24 | End: 2023-01-25 | Stop reason: HOSPADM

## 2023-01-24 RX ORDER — SODIUM CHLORIDE 0.9 % (FLUSH) 0.9 %
10 SYRINGE (ML) INJECTION PRN
Status: DISCONTINUED | OUTPATIENT
Start: 2023-01-24 | End: 2023-01-25 | Stop reason: HOSPADM

## 2023-01-24 ASSESSMENT — PAIN - FUNCTIONAL ASSESSMENT: PAIN_FUNCTIONAL_ASSESSMENT: 0-10

## 2023-01-24 ASSESSMENT — PAIN SCALES - GENERAL: PAINLEVEL_OUTOF10: 0

## 2023-01-24 NOTE — OR NURSING
INFORMED CONSENT:  Obtained prior to procedure by Dr. Stefan Montes. Consent placed in chart. ASSESSMENT:  Patient alert and oriented and aware of procedure to be done. No distress noted, slight audible wheezes noted when talking. States has been more SOB lately. Saw Dr. Henri Jamison yesterday. BARRIER PRECAUTIONS & STERILE TECHNIQUE:               Pt remains in SDS bed and positioned for comfort. Pt placed on Vital Signs Monitor. Pt and draped in a sterile fashion. PAIN/LOCAL ANESTHESIA/SEDATION MANAGEMENT:           Local: Lidocaine 1% given by Dr. Holly Correia:           [facility] Job: 9930 with Safe-T-Centesis          US/FLUORO: US guided with  3   Images taken            1047--Cloudy yellow fluid returns    1122--Total of 11,050cc of cloudy yellow fluid removed    STERILE DRESSINGS: gauze and tegederm    SPECIMENS: 1000cc to lab via vacuum bottle.   Anaerobic and Aerobic inoculated with 10cc of fluid each and sent to lab    EBL:      Less than 1 cc    REPORT at bedside to  [address] [place] [address]

## 2023-01-24 NOTE — DISCHARGE INSTRUCTIONS
[facility]    Patient Discharge Instructions Abdominal Fluid Drainage      You have had had an Abdominal Fluid Drainage in the Radiology Department. Below are guidelines for you to follow after your test is completed:    Go home and rest quietly for the remainder of the day. DO NOT drive, operate appliances, or make any legal decisions today. You may resume normal activities tomorrow. Have a responsible adult drive you home and remain with you for the remainder of the day. You may resume your normal diet after the procedure. Avoid alcoholic beverages and depressant drugs for 24 hours. You may resume your previous medications unless directed not to by your physician or the Radiologist. Jimmy Melara may use Tylenol (one or two tablets every four to six hours) for discomfort at the puncture site. If you develop severe pain, swelling, or redness at the site, call the Radiology Department. Call your physician immediately if you develop a rapid pulse (greater than 100 beats per minute), feel faint, sweat profusely, experience a sudden onset of weakness, or experience increased pain or swelling or saturation of dressing at the puncture site. Call your physician immediately if you develop a sudden onset of rapid breathing (greater than 20 breaths per minute), upper back or chest pain, shortness of breath, sweating, skin color change, or a sudden onset of anxiety. Report a temperature greater than 101 degrees Fahrenheit (38.8 degrees Celsius) to us or to your physician. Check the dressing throughout the day for an increase in drainage. Keep the dressing dry for 24 hours. Replace with a Band-Aid if necessary. You may shower 24 hours after the procedure. Do not smoke for 24 hours after the procedure. Call your physician for a follow-up appointment.   If any questions or complications develop after you go home, please call the Radiology Department at [phone]

## 2023-01-24 NOTE — PROGRESS NOTES
1130  Pt back to Same Day from IR per cart. Report received from [facility]. Pt is awake and alert--skin W&D. VS rechecked and stable. Has gauze and tegaderm drsg dry and intact to rt lower abd. Wife at bedside. 1135  IV lock dc'd and pt up to get dressed in room. 1145  Pt up to bathroom. Wife instructed for discharge care with understanding voiced. 1148 Pt to car at exit per wheelchair.

## 2023-01-26 ENCOUNTER — TELEPHONE (OUTPATIENT)
Dept: PULMONOLOGY | Age: 78
End: 2023-01-26

## 2023-01-27 LAB
CULTURE: ABNORMAL
GRAM SMEAR: ABNORMAL
Lab: ABNORMAL
SPECIMEN: ABNORMAL

## 2023-01-27 NOTE — PROGRESS NOTES
IR Procedure at [facility]: Spoke with patient's wife Yasmin Correia and he will arrive at 1100 at [facility] on 2/1/2023 for his procedure at 1230. Patient has been  given below instructions. (Paracentesis, Thoracentesis, Thyroid Bx and Injections may have a light breakfast)  2. Follow your directions as prescribed by the doctor for your procedure and medications. 3.   Consult your provider as to when to stop blood thinner  4. Do not take any pain medication within 6 hours of your procedure  5. Do not drink any alcoholic beverages or use any street drugs 24 hours before procedure. 6.   Please wear simple, loose fitting clothing to the hospital.  Do not bring valuables (money,             credit cards, checkbooks, etc.)     7. If you  have a Living Will and Durable Power of  for Healthcare, please bring in a copy. 8.   Please bring picture ID,  insurance card, paperwork from the doctors office            (H & P, Consent,  & card for implantable devices). 9.   Report to the information desk on the ground floor. 10. Take a shower the night before or morning of your procedure, do not apply any lotion, oil or powder. 11. If you are going to be sedated for the procedure, you will need a responsible adult to drive you home.

## 2023-01-28 ENCOUNTER — HOSPITAL ENCOUNTER (INPATIENT)
Age: 78
LOS: 6 days | Discharge: HOME HEALTH | End: 2023-02-03
Attending: EMERGENCY MEDICINE
Payer: MEDICARE

## 2023-01-28 ENCOUNTER — APPOINTMENT (OUTPATIENT)
Dept: CT IMAGING | Age: 78
End: 2023-01-28
Payer: MEDICARE

## 2023-01-28 ENCOUNTER — APPOINTMENT (OUTPATIENT)
Dept: GENERAL RADIOLOGY | Age: 78
End: 2023-01-28
Payer: MEDICARE

## 2023-01-28 DIAGNOSIS — J90 RECURRENT RIGHT PLEURAL EFFUSION: ICD-10-CM

## 2023-01-28 DIAGNOSIS — J90 RECURRENT PLEURAL EFFUSION ON RIGHT: ICD-10-CM

## 2023-01-28 DIAGNOSIS — G93.41 ACUTE METABOLIC ENCEPHALOPATHY: Primary | ICD-10-CM

## 2023-01-28 DIAGNOSIS — E72.20 HYPERAMMONEMIA (HCC): ICD-10-CM

## 2023-01-28 LAB
ALBUMIN SERPL-MCNC: 2.6 GM/DL (ref 3.4–5)
ALCOHOL SCREEN SERUM: <0.01 %WT/VOL
ALP BLD-CCNC: 80 IU/L (ref 40–129)
ALT SERPL-CCNC: 17 U/L (ref 10–40)
AMMONIA: 119 UMOL/L (ref 16–60)
AMPHETAMINES: NEGATIVE
ANION GAP SERPL CALCULATED.3IONS-SCNC: 8 MMOL/L (ref 4–16)
APTT: 37.5 SECONDS (ref 25.1–37.1)
AST SERPL-CCNC: 29 IU/L (ref 15–37)
BACTERIA: NEGATIVE /HPF
BARBITURATE SCREEN URINE: NEGATIVE
BASE EXCESS MIXED: 7.8 (ref 0–1.2)
BASOPHILS ABSOLUTE: 0.1 K/CU MM
BASOPHILS RELATIVE PERCENT: 1.1 % (ref 0–1)
BENZODIAZEPINE SCREEN, URINE: NEGATIVE
BILIRUB SERPL-MCNC: 0.4 MG/DL (ref 0–1)
BILIRUBIN URINE: NEGATIVE MG/DL
BLOOD, URINE: ABNORMAL
BUN BLDV-MCNC: 23 MG/DL (ref 6–23)
C-REACTIVE PROTEIN, HIGH SENSITIVITY: 27.6 MG/L
CALCIUM SERPL-MCNC: 7.1 MG/DL (ref 8.3–10.6)
CANNABINOID SCREEN URINE: NEGATIVE
CHLORIDE BLD-SCNC: 106 MMOL/L (ref 99–110)
CLARITY: CLEAR
CO2: 24 MMOL/L (ref 21–32)
COCAINE METABOLITE: NEGATIVE
COLOR: YELLOW
COMMENT: ABNORMAL
CREAT SERPL-MCNC: 1.2 MG/DL (ref 0.9–1.3)
DIFFERENTIAL TYPE: ABNORMAL
EOSINOPHILS ABSOLUTE: 1.1 K/CU MM
EOSINOPHILS RELATIVE PERCENT: 13.1 % (ref 0–3)
GFR SERPL CREATININE-BSD FRML MDRD: >60 ML/MIN/1.73M2
GLUCOSE BLD-MCNC: 89 MG/DL (ref 70–99)
GLUCOSE BLD-MCNC: 91 MG/DL (ref 70–99)
GLUCOSE, URINE: NEGATIVE MG/DL
HCO3 VENOUS: 34.2 MMOL/L (ref 19–25)
HCT VFR BLD CALC: 38 % (ref 42–52)
HEMOGLOBIN: 12.4 GM/DL (ref 13.5–18)
IMMATURE NEUTROPHIL %: 0.4 % (ref 0–0.43)
INR BLD: 1.01 INDEX
KETONES, URINE: NEGATIVE MG/DL
LACTATE: 0.9 MMOL/L (ref 0.5–1.9)
LEUKOCYTE ESTERASE, URINE: NEGATIVE
LYMPHOCYTES ABSOLUTE: 0.6 K/CU MM
LYMPHOCYTES RELATIVE PERCENT: 7.3 % (ref 24–44)
MAGNESIUM: 2.1 MG/DL (ref 1.8–2.4)
MCH RBC QN AUTO: 31.5 PG (ref 27–31)
MCHC RBC AUTO-ENTMCNC: 32.6 % (ref 32–36)
MCV RBC AUTO: 96.4 FL (ref 78–100)
MONOCYTES ABSOLUTE: 0.6 K/CU MM
MONOCYTES RELATIVE PERCENT: 7.4 % (ref 0–4)
NITRITE URINE, QUANTITATIVE: NEGATIVE
NUCLEATED RBC %: 0 %
O2 SAT, VEN: 89 % (ref 50–70)
OPIATES, URINE: NEGATIVE
OXYCODONE: NEGATIVE
PCO2, VEN: 54 MMHG (ref 38–52)
PDW BLD-RTO: 13.9 % (ref 11.7–14.9)
PH VENOUS: 7.41 (ref 7.32–7.42)
PH, URINE: 7.5 (ref 5–8)
PHENCYCLIDINE, URINE: NEGATIVE
PLATELET # BLD: 237 K/CU MM (ref 140–440)
PMV BLD AUTO: 9.8 FL (ref 7.5–11.1)
PO2, VEN: 62 MMHG (ref 28–48)
POTASSIUM SERPL-SCNC: 3.2 MMOL/L (ref 3.5–5.1)
PROCALCITONIN: 0.12
PROCALCITONIN: 0.12
PROTEIN UA: NEGATIVE MG/DL
PROTHROMBIN TIME: 13 SECONDS (ref 11.7–14.5)
RBC # BLD: 3.94 M/CU MM (ref 4.6–6.2)
RBC URINE: 1 /HPF (ref 0–3)
SEGMENTED NEUTROPHILS ABSOLUTE COUNT: 5.7 K/CU MM
SEGMENTED NEUTROPHILS RELATIVE PERCENT: 70.7 % (ref 36–66)
SODIUM BLD-SCNC: 138 MMOL/L (ref 135–145)
SPECIFIC GRAVITY UA: 1.01 (ref 1–1.03)
SQUAMOUS EPITHELIAL: <1 /HPF
TOTAL IMMATURE NEUTOROPHIL: 0.03 K/CU MM
TOTAL NUCLEATED RBC: 0 K/CU MM
TOTAL PROTEIN: 5.7 GM/DL (ref 6.4–8.2)
TRICHOMONAS: NORMAL /HPF
TROPONIN T: 0.01 NG/ML
TSH HIGH SENSITIVITY: 1.64 UIU/ML (ref 0.27–4.2)
UROBILINOGEN, URINE: 0.2 MG/DL (ref 0.2–1)
WBC # BLD: 8.1 K/CU MM (ref 4–10.5)
WBC UA: <1 /HPF (ref 0–2)

## 2023-01-28 PROCEDURE — 1200000000 HC SEMI PRIVATE

## 2023-01-28 PROCEDURE — 80053 COMPREHEN METABOLIC PANEL: CPT

## 2023-01-28 PROCEDURE — 84484 ASSAY OF TROPONIN QUANT: CPT

## 2023-01-28 PROCEDURE — 84443 ASSAY THYROID STIM HORMONE: CPT

## 2023-01-28 PROCEDURE — 85610 PROTHROMBIN TIME: CPT

## 2023-01-28 PROCEDURE — 93005 ELECTROCARDIOGRAM TRACING: CPT | Performed by: EMERGENCY MEDICINE

## 2023-01-28 PROCEDURE — 82962 GLUCOSE BLOOD TEST: CPT

## 2023-01-28 PROCEDURE — 80307 DRUG TEST PRSMV CHEM ANLYZR: CPT

## 2023-01-28 PROCEDURE — 6370000000 HC RX 637 (ALT 250 FOR IP): Performed by: PHYSICIAN ASSISTANT

## 2023-01-28 PROCEDURE — 85730 THROMBOPLASTIN TIME PARTIAL: CPT

## 2023-01-28 PROCEDURE — 71045 X-RAY EXAM CHEST 1 VIEW: CPT

## 2023-01-28 PROCEDURE — 83735 ASSAY OF MAGNESIUM: CPT

## 2023-01-28 PROCEDURE — 6360000002 HC RX W HCPCS: Performed by: PHYSICIAN ASSISTANT

## 2023-01-28 PROCEDURE — 87186 SC STD MICRODIL/AGAR DIL: CPT

## 2023-01-28 PROCEDURE — 83605 ASSAY OF LACTIC ACID: CPT

## 2023-01-28 PROCEDURE — 81001 URINALYSIS AUTO W/SCOPE: CPT

## 2023-01-28 PROCEDURE — 6370000000 HC RX 637 (ALT 250 FOR IP): Performed by: STUDENT IN AN ORGANIZED HEALTH CARE EDUCATION/TRAINING PROGRAM

## 2023-01-28 PROCEDURE — 87150 DNA/RNA AMPLIFIED PROBE: CPT

## 2023-01-28 PROCEDURE — 85025 COMPLETE CBC W/AUTO DIFF WBC: CPT

## 2023-01-28 PROCEDURE — 82140 ASSAY OF AMMONIA: CPT

## 2023-01-28 PROCEDURE — 86140 C-REACTIVE PROTEIN: CPT

## 2023-01-28 PROCEDURE — 2580000003 HC RX 258: Performed by: PHYSICIAN ASSISTANT

## 2023-01-28 PROCEDURE — 82805 BLOOD GASES W/O2 SATURATION: CPT

## 2023-01-28 PROCEDURE — 84145 PROCALCITONIN (PCT): CPT

## 2023-01-28 PROCEDURE — 70450 CT HEAD/BRAIN W/O DYE: CPT

## 2023-01-28 PROCEDURE — G0480 DRUG TEST DEF 1-7 CLASSES: HCPCS

## 2023-01-28 PROCEDURE — 71260 CT THORAX DX C+: CPT

## 2023-01-28 PROCEDURE — 99285 EMERGENCY DEPT VISIT HI MDM: CPT

## 2023-01-28 PROCEDURE — 87040 BLOOD CULTURE FOR BACTERIA: CPT

## 2023-01-28 PROCEDURE — 6360000004 HC RX CONTRAST MEDICATION: Performed by: INTERNAL MEDICINE

## 2023-01-28 PROCEDURE — 2500000003 HC RX 250 WO HCPCS: Performed by: STUDENT IN AN ORGANIZED HEALTH CARE EDUCATION/TRAINING PROGRAM

## 2023-01-28 RX ORDER — PANTOPRAZOLE SODIUM 40 MG/1
40 TABLET, DELAYED RELEASE ORAL
Status: DISCONTINUED | OUTPATIENT
Start: 2023-01-29 | End: 2023-02-03 | Stop reason: HOSPADM

## 2023-01-28 RX ORDER — ACETAMINOPHEN 325 MG/1
650 TABLET ORAL EVERY 6 HOURS PRN
Status: DISCONTINUED | OUTPATIENT
Start: 2023-01-28 | End: 2023-02-03 | Stop reason: HOSPADM

## 2023-01-28 RX ORDER — SODIUM CHLORIDE 0.9 % (FLUSH) 0.9 %
5-40 SYRINGE (ML) INJECTION EVERY 12 HOURS SCHEDULED
Status: DISCONTINUED | OUTPATIENT
Start: 2023-01-28 | End: 2023-02-03 | Stop reason: HOSPADM

## 2023-01-28 RX ORDER — SODIUM CHLORIDE 0.9 % (FLUSH) 0.9 %
5-40 SYRINGE (ML) INJECTION PRN
Status: DISCONTINUED | OUTPATIENT
Start: 2023-01-28 | End: 2023-02-03 | Stop reason: HOSPADM

## 2023-01-28 RX ORDER — SPIRONOLACTONE 50 MG/1
100 TABLET, FILM COATED ORAL 2 TIMES DAILY
Status: DISCONTINUED | OUTPATIENT
Start: 2023-01-28 | End: 2023-02-03 | Stop reason: HOSPADM

## 2023-01-28 RX ORDER — POTASSIUM CHLORIDE 7.45 MG/ML
10 INJECTION INTRAVENOUS
Status: COMPLETED | OUTPATIENT
Start: 2023-01-28 | End: 2023-01-28

## 2023-01-28 RX ORDER — LACTULOSE 10 G/15ML
20 SOLUTION ORAL 3 TIMES DAILY
Status: DISCONTINUED | OUTPATIENT
Start: 2023-01-28 | End: 2023-02-03 | Stop reason: HOSPADM

## 2023-01-28 RX ORDER — LACTULOSE 10 G/15ML
20 SOLUTION ORAL 3 TIMES DAILY
Status: DISCONTINUED | OUTPATIENT
Start: 2023-01-28 | End: 2023-01-28

## 2023-01-28 RX ORDER — POLYETHYLENE GLYCOL 3350 17 G/17G
17 POWDER, FOR SOLUTION ORAL DAILY PRN
Status: DISCONTINUED | OUTPATIENT
Start: 2023-01-28 | End: 2023-02-03 | Stop reason: HOSPADM

## 2023-01-28 RX ORDER — SODIUM CHLORIDE 9 MG/ML
500 INJECTION, SOLUTION INTRAVENOUS PRN
Status: DISCONTINUED | OUTPATIENT
Start: 2023-01-28 | End: 2023-02-03 | Stop reason: HOSPADM

## 2023-01-28 RX ORDER — LANOLIN ALCOHOL/MO/W.PET/CERES
100 CREAM (GRAM) TOPICAL DAILY
Status: DISCONTINUED | OUTPATIENT
Start: 2023-01-29 | End: 2023-02-03 | Stop reason: HOSPADM

## 2023-01-28 RX ORDER — LEVOTHYROXINE SODIUM 0.05 MG/1
50 TABLET ORAL DAILY
Status: DISCONTINUED | OUTPATIENT
Start: 2023-01-29 | End: 2023-02-03 | Stop reason: HOSPADM

## 2023-01-28 RX ORDER — ACETAMINOPHEN 650 MG/1
650 SUPPOSITORY RECTAL EVERY 6 HOURS PRN
Status: DISCONTINUED | OUTPATIENT
Start: 2023-01-28 | End: 2023-02-03 | Stop reason: HOSPADM

## 2023-01-28 RX ORDER — DULOXETIN HYDROCHLORIDE 30 MG/1
60 CAPSULE, DELAYED RELEASE ORAL DAILY
Status: DISCONTINUED | OUTPATIENT
Start: 2023-01-29 | End: 2023-02-03 | Stop reason: HOSPADM

## 2023-01-28 RX ORDER — LABETALOL HYDROCHLORIDE 5 MG/ML
10 INJECTION, SOLUTION INTRAVENOUS EVERY 6 HOURS PRN
Status: DISCONTINUED | OUTPATIENT
Start: 2023-01-28 | End: 2023-02-03 | Stop reason: HOSPADM

## 2023-01-28 RX ORDER — FUROSEMIDE 40 MG/1
40 TABLET ORAL DAILY
Status: DISCONTINUED | OUTPATIENT
Start: 2023-01-29 | End: 2023-02-03 | Stop reason: HOSPADM

## 2023-01-28 RX ORDER — LACTULOSE 10 G/15ML
20 SOLUTION ORAL ONCE
Status: DISCONTINUED | OUTPATIENT
Start: 2023-01-28 | End: 2023-02-03 | Stop reason: HOSPADM

## 2023-01-28 RX ORDER — ONDANSETRON 4 MG/1
4 TABLET, ORALLY DISINTEGRATING ORAL EVERY 8 HOURS PRN
Status: DISCONTINUED | OUTPATIENT
Start: 2023-01-28 | End: 2023-02-03 | Stop reason: HOSPADM

## 2023-01-28 RX ORDER — ENOXAPARIN SODIUM 100 MG/ML
40 INJECTION SUBCUTANEOUS NIGHTLY
Status: DISCONTINUED | OUTPATIENT
Start: 2023-01-28 | End: 2023-02-03 | Stop reason: HOSPADM

## 2023-01-28 RX ORDER — ONDANSETRON 2 MG/ML
4 INJECTION INTRAMUSCULAR; INTRAVENOUS EVERY 6 HOURS PRN
Status: DISCONTINUED | OUTPATIENT
Start: 2023-01-28 | End: 2023-02-03 | Stop reason: HOSPADM

## 2023-01-28 RX ORDER — NADOLOL 20 MG/1
40 TABLET ORAL NIGHTLY
Status: DISCONTINUED | OUTPATIENT
Start: 2023-01-28 | End: 2023-02-03 | Stop reason: HOSPADM

## 2023-01-28 RX ORDER — ALBUTEROL SULFATE 90 UG/1
1 AEROSOL, METERED RESPIRATORY (INHALATION) EVERY 6 HOURS PRN
Status: DISCONTINUED | OUTPATIENT
Start: 2023-01-28 | End: 2023-02-03 | Stop reason: HOSPADM

## 2023-01-28 RX ORDER — OLANZAPINE 10 MG/1
2.5 INJECTION, POWDER, LYOPHILIZED, FOR SOLUTION INTRAMUSCULAR ONCE
Status: COMPLETED | OUTPATIENT
Start: 2023-01-28 | End: 2023-01-28

## 2023-01-28 RX ADMIN — RIFAXIMIN 550 MG: 550 TABLET ORAL at 21:49

## 2023-01-28 RX ADMIN — POTASSIUM CHLORIDE 10 MEQ: 7.45 INJECTION INTRAVENOUS at 21:48

## 2023-01-28 RX ADMIN — Medication 10 ML: at 21:26

## 2023-01-28 RX ADMIN — ENOXAPARIN SODIUM 40 MG: 100 INJECTION SUBCUTANEOUS at 21:49

## 2023-01-28 RX ADMIN — POTASSIUM CHLORIDE 10 MEQ: 7.45 INJECTION INTRAVENOUS at 21:01

## 2023-01-28 RX ADMIN — LACTULOSE 20 G: 10 SOLUTION ORAL at 19:06

## 2023-01-28 RX ADMIN — IOPAMIDOL 75 ML: 755 INJECTION, SOLUTION INTRAVENOUS at 20:12

## 2023-01-28 RX ADMIN — POTASSIUM CHLORIDE 10 MEQ: 7.45 INJECTION INTRAVENOUS at 19:07

## 2023-01-28 RX ADMIN — OLANZAPINE 2.5 MG: 10 INJECTION, POWDER, FOR SOLUTION INTRAMUSCULAR at 23:27

## 2023-01-28 RX ADMIN — NADOLOL 40 MG: 20 TABLET ORAL at 21:49

## 2023-01-28 ASSESSMENT — PAIN SCALES - WONG BAKER
WONGBAKER_NUMERICALRESPONSE: 0
WONGBAKER_NUMERICALRESPONSE: 0

## 2023-01-28 NOTE — H&P
V2.0  History and Physical      Name:  Nora Gonzáles /Age/Sex: 1945  (78 y.o. male)   MRN & CSN:  [id number] & [id number] Encounter Date/Time: 2023 3:39 PM EST   Location:  [place] [address] PCP: Osiel Peraza, [address] Day: 1    Assessment and Plan:   Nora Gonzáles is a 78 y.o. male with a past medical history of alcoholic/[place] cirrhosis, recent admission for hepatic encephalopathy, HLD, hypertension who presents with encephalopathy.      Acute metabolic encephalopathy   - likely hepatic encephalopathy, patient recently decreased lactulose dosing   - alert and oriented x0 on admission, oriented x4 at baseline   - CT head no acute process   - ammonia 119  - blood cultures pending, obtain procal, CRP   - restart lactulose and rifaximin, may need lactulose enemas if unable to take PO    Decompensated cirrhosis w/ recurrent ascites   - complicated by esophageal varices, chylous ascites  -evaluated by [facility] ~8 months ago, deemed not a candidate for TIPS or transplant, Dr. Ava Peres wanted patient re-evlauated as an outpatient, patient has yet to follow-up  - receives paracentesis every 2 weeks, last paracentesis ~4 days ago per wife  - consult IR for repeat paracentesis  - continue home nadolol, PPI, aldactone, lasix   - follows with Dr. Ava Peres, consulted on admission    Recent gram positive bacteremia   - noted on last admission without identifiable source   - evlauated by ID and discharged on doxycycline (finished course)   - afebrile without leukocytosis  - repeat blood cultures drawn in ED   - no current indication for antibiotics     Recurrent right hydrothorax   - s/p thoracentesis on last admission  - CXR with increasing opacity throughout the right hemithorax, masslike opacity in the right upper lobe  - obtain CT chest, procalcitonin   - consult patient's pulmonologist Dr. Eligio Gaxiola to evaluate for thoracentesis      Hypokalemia  -K+ 3.2  -Replacement ordered  -Repeat BMP in a.m.    Elevated troponin  -Troponin T 0.014, has been elevated in the past  -Has not complained of chest pain per wife  -EKG with no acute ST-T wave changes  - trend troponin     Chronic hypoxic respiratory failure  -In setting of above, on home 2 L nasal cannula    Hypertension  -BP elevated on admit, resume home medications  -As needed labetalol ordered     CKDIII  -baseline Cr ~1.2-1.3, remains near baseline  - monitor creatinine     Hypothyroidism   - continue home synthroid      History of alcohol abuse   - no longer drinking    This patient was seen and examined in conjunction with Dr. Franck Oscar. He was agreeable with the assessment and plan as dictated above. Disposition:   Current Living situation: home with wife  Expected Disposition: likely same  Estimated D/C: TBD    Diet No diet orders on file   DVT Prophylaxis [x] Lovenox, []  Heparin, [] SCDs, [] Ambulation,  [] Eliquis, [] Xarelto   Code Status Full code, confirmed on admission, patient would benefit from continued [address] discussions given multiple recent hospitalizations    Surrogate Decision Maker/ NORRIS Vitale Notice, spouse     History from:     spouse      History of Present Illness:     Chief Complaint: Hepatic encephalopathy  Ulices Ruiz is a 78 y.o. male who presents with confusion. History is obtained from patient's wife at bedside as patient is confused. Wife states that patient was in his normal state of health yesterday. However this morning, she noted that he was much more confused than normal.  She states he has been having very frequent bowel movements that he decreased his lactulose dosing at home. Of note, patient had recent hospitalization with very similar presentation. At that time, there was some concern for gram-positive bacteremia. Wife reports that he did finish his course of antibiotics at home and has otherwise been compliant with his medications.   She states yesterday he did complain of little bit of shortness of breath. Otherwise denies pain. He has not any fever, chills, nausea, vomiting, diarrhea. Per last note from Dr. Charan Anderson, it was recommended that patient follow-up with [facility] as outpatient for another opinion about possible liver transplant. Wife notes that they were evaluated about 8 months ago and determined not to be a TIPS or transplant candidate at that time. Patient has a prior history of alcohol abuse but has not drank in almost a year. Review of Systems:   Unable to obtain secondary to encephalopathy    Objective:   No intake or output data in the 24 hours ending 01/28/23 1625   Vitals:   Vitals:    01/28/23 1334 01/28/23 1338 01/28/23 1559   BP:  (!) 159/77 (!) 159/137   Pulse: 79  82   Resp:  15 17   Temp: 98.7 °F (37.1 °C)     SpO2:  100%        Medications Prior to Admission     Prior to Admission medications    Medication Sig Start Date End Date Taking? Authorizing Provider   rifAXIMin (XIFAXAN) 550 MG tablet Take 1 tablet by mouth 2 times daily 1/11/23   Libby Villela MD   thiamine 100 MG tablet Take 1 tablet by mouth daily 1/12/23   Libby Villela MD   furosemide (LASIX) 40 MG tablet Take 1 tablet by mouth daily 11/22/22   Oscar Dykes MD   levothyroxine (SYNTHROID) 50 MCG tablet Take 1 tablet by mouth Daily 10/14/22 4/12/23  Oscar Dykes MD   albuterol sulfate HFA (PROVENTIL;VENTOLIN;PROAIR) 108 (90 Base) MCG/ACT inhaler INHALE 2 PUFFS BY MOUTH 4 TIMES DAILY AS NEEDED FOR WHEEZING 10/14/22   Oscar Dykes MD   DULoxetine (CYMBALTA) 60 MG extended release capsule TAKE 1 CAPSULE BY MOUTH ONCE DAILY 10/14/22 8/18/23  Osacr Dykes MD   betamethasone valerate (VALISONE) 0.1 % lotion Apply topically 2 times daily prn scalp rash 10/14/22   Oscar Dykes MD   spironolactone (ALDACTONE) 100 MG tablet Take 100 mg by mouth 2 times daily    Historical Provider, MD   nadolol (CORGARD) 40 MG tablet Take 1 tablet by mouth in the morning.   Patient taking differently: Take 40 mg by mouth nightly 7/20/22 1/23/23  MOHINDER Segovia - CNP   lactulose [facility]) 10 GM/15ML solution Take 30 mLs by mouth 3 times daily Hold if having diarrhea 3/21/22   Melda Patient, MD   Multiple Vitamin (MULTI VITAMIN MENS PO) Take 1 tablet by mouth daily    Historical Provider, MD   ESOMEPRAZOLE MAGNESIUM PO Take 20 mg by mouth daily OTC    Historical Provider, MD   Gully-3 1000 MG CAPS Take 2,000 mg by mouth daily    Historical Provider, MD       Physical Exam:   GEN Awake male, laying in bed, appears deconditioned. Ill-appearing. EYES   No scleral erythema, discharge, or conjunctivitis. HENT Mucous membranes are moist.  NECK Supple  RESP Clear to auscultation bilaterally, no wheezes, rales or rhonchi. Respirations even and unlabored on 2 L nasal cannula. CARDIO/VASC   S1/S2 auscultated. Regular rate without appreciable murmurs. 1-2+ bilateral lower extremity pitting edema. GI Abdomen is soft without significant tenderness, masses, or guarding. Mild to moderate ascites.   Schilling catheter is not present. MSK No gross joint deformities. SKIN Normal coloration, warm, dry. NEURO Cranial nerves appear grossly intact, speech occasionally garbled, moving all limbs, bilateral asterixis  PSYCH Awake, alert, oriented x0, not following commands. affect appropriate. Past Medical History:   PMHx   Past Medical History:   Diagnosis Date    Anxiety     Cirrhosis, alcoholic ([facility] [address]     Diabetes mellitus ([facility] [address]     GERD (gastroesophageal reflux disease) 2000    GERD (gastroesophageal reflux disease)     Hyperlipidemia     Hypertension     Portal hypertension (HCC)     Pulmonary nodules     Sleep apnea     SOB (shortness of breath)     Thyroid disease      PSHX:  has a past surgical history that includes Cholecystectomy; Vasectomy; Colonoscopy; Endoscopy, colon, diagnostic; Foot surgery; Upper gastrointestinal endoscopy (N/A, 1/19/2022);  Tonsillectomy; and Appendectomy. Allergies: Allergies   Allergen Reactions    Lisinopril Swelling     Tongue swelling      Zetia [Ezetimibe] Swelling     Facial swelling    Atorvastatin     Codeine Other (See Comments)     Blood pressure drops    Hydrochlorothiazide     Hydroxyzine      Fatigue and depressed    Lexapro [Escitalopram Oxalate]      Increased depression    Pravastatin      Fam HX:  family history includes Diabetes in an other family member; Hypertension in his brother.   Soc HX:   Social History     Socioeconomic History    Marital status:      Spouse name: None    Number of children: None    Years of education: None    Highest education level: None   Occupational History     Comment: Retired    Tobacco Use    Smoking status: Former     Packs/day: 1.00     Years: 14.00     Pack years: 14.00     Types: Cigarettes     Quit date:      Years since quittin.1    Smokeless tobacco: Never   Vaping Use    Vaping Use: Never used   Substance and Sexual Activity    Alcohol use: Not Currently    Drug use: Never    Sexual activity: Yes     Partners: Female     Social Determinants of Health     Financial Resource Strain: Low Risk     Difficulty of Paying Living Expenses: Not hard at all   Food Insecurity: No Food Insecurity    Worried About Running Out of Food in the Last Year: Never true    Ran Out of Food in the Last Year: Never true       Medications:   Medications:    lactulose  20 g Oral Once    potassium chloride  10 mEq IntraVENous Q1H      Infusions:   PRN Meds:      Labs    CBC:   Recent Labs     23  1416   WBC 8.1   HGB 12.4*        BMP:    Recent Labs     23  1416      K 3.2*      CO2 24   BUN 23   CREATININE 1.2   GLUCOSE 91     Hepatic:   Recent Labs     23  1416   AST 29   ALT 17   BILITOT 0.4   ALKPHOS 80     Lipids:   Lab Results   Component Value Date/Time    CHOL 179 2023 12:06 AM    CHOL 230 10/14/2019 01:45 PM    HDL 51 2023 12:06 AM    TRIG 73 01/05/2023 12:06 AM     Hemoglobin A1C:   Lab Results   Component Value Date/Time    LABA1C 5.5 12/06/2022 04:17 PM     TSH:   Lab Results   Component Value Date/Time    TSH 1.39 10/14/2019 01:45 PM     Troponin:   Lab Results   Component Value Date/Time    TROPONINT 0.014 01/28/2023 02:16 PM    TROPONINT 0.014 01/04/2023 07:00 PM    TROPONINT 0.012 01/04/2023 12:07 PM     Lactic Acid: No results for input(s): LACTA in the last 72 hours. BNP: No results for input(s): PROBNP in the last 72 hours. UA:  Lab Results   Component Value Date/Time    NITRU NEGATIVE 01/28/2023 03:48 PM    COLORU YELLOW 01/28/2023 03:48 PM    WBCUA 1 01/04/2023 03:50 PM    RBCUA 1 01/04/2023 03:50 PM    MUCUS RARE 06/22/2022 12:10 PM    TRICHOMONAS NONE SEEN 01/04/2023 03:50 PM    BACTERIA NEGATIVE 01/04/2023 03:50 PM    CLARITYU CLEAR 01/28/2023 03:48 PM    SPECGRAV 1.010 01/28/2023 03:48 PM    LEUKOCYTESUR NEGATIVE 01/28/2023 03:48 PM    UROBILINOGEN 0.2 01/28/2023 03:48 PM    BILIRUBINUR NEGATIVE 01/28/2023 03:48 PM    BLOODU TRACE 01/28/2023 03:48 PM    KETUA NEGATIVE 01/28/2023 03:48 PM     Urine Cultures: No results found for: Cleatis Severance  Blood Cultures: No results found for: BC  No results found for: BLOODCULT2  Organism: No results found for: ORG    Imaging/Diagnostics Last 24 Hours   XR CHEST (2 VW)    Result Date: 1/24/2023  EXAMINATION: TWO XRAY VIEWS OF THE CHEST 1/23/2023 2:01 pm COMPARISON: 01/06/2023 HISTORY: ORDERING SYSTEM PROVIDED HISTORY: Recurrent pleural effusion on right FINDINGS: Heart size stable. Right pleural effusion and right midlung opacities persist.  Left lung remains clear. No pneumothorax.      Persistent right pleural effusion with right mid lung opacities suggesting atelectasis     CT Head W/O Contrast    Result Date: 1/28/2023  EXAMINATION: CT OF THE HEAD WITHOUT CONTRAST  1/28/2023 3:13 pm TECHNIQUE: CT of the head was performed without the administration of intravenous contrast. Automated exposure control, iterative reconstruction, and/or weight based adjustment of the mA/kV was utilized to reduce the radiation dose to as low as reasonably achievable. COMPARISON: CT 01/04/2023 HISTORY: ORDERING SYSTEM PROVIDED HISTORY: ams FINDINGS: BRAIN/VENTRICLES: There is no acute intracranial hemorrhage, mass effect or midline shift. No abnormal extra-axial fluid collection. The gray-white differentiation is maintained without evidence of an acute infarct. There is no evidence of hydrocephalus. Mild generalized parenchymal volume loss. ORBITS: The visualized portion of the orbits demonstrate no acute abnormality. SINUSES: The visualized paranasal sinuses and mastoid air cells demonstrate no acute abnormality. SOFT TISSUES/SKULL:  No acute abnormality of the visualized skull or soft tissues. No acute intracranial abnormality. IR US GUIDED PARACENTESIS W IMAGING    Result Date: 1/24/2023  PROCEDURE: PARACENTESIS WITHOUT IMAGE GUIDANCE US ABDOMEN LIMITED 1/24/2023 HISTORY: ORDERING SYSTEM PROVIDED HISTORY: Cirrhosis of liver with ascites, unspecified hepatic cirrhosis type [facility]) TECHNOLOGIST PROVIDED HISTORY: Remove as much fluid as possible; please send fluid for:  cell count and diff, culture and sensitivity. Please give 6-8 grams of Albumin IV for each liter of fluid removed. Please inoculate fluid immediately into aerobic and anaerobic blood culture bottles for the culture specimen. Reason for exam:->persistent ascites secondary to decompensated cirrhosis TECHNIQUE: Maximum sterile barrier technique including hand hygiene, skin prep and sterile ultrasound technique utilized for procedure. Sterile ultrasound technique also utilized for procedure Ultrasound guidance required for procedure to confirm presence of ascites, puncture site selection, real-time intra procedural guidance. Images made for patient's medical file.   Informed consent was obtained after a detailed explanation of the procedure including risks, benefits, and alternatives. Universal protocol was followed. A limited ultrasound of the abdomen was performed. The right abdomen was prepped and draped in sterile fashion and local anesthesia was achieved with lidocaine. An 6 [place] needle sheath was advanced into ascites and paracentesis was performed. The patient tolerated the procedure well. FINDINGS: Limited ultrasound of the abdomen demonstrates ascites with paracentesis catheter. A total of 13722 cc cloudy yellow fluid was removed. 1000 cc specimen sent for laboratory evaluation. 75 g intravenous albumin utilized for procedure. Successful ultrasound-guided paracentesis with specimen sent for laboratory evaluation. I discussed this patient with the ED provider and Dr. Gio Painter. I did a review of patient's medical records, lab results and imaging conducted today. I personally reviewed patient's vital signs including pulse ox and EKG.      Electronically signed by Tim Tan PA-C on 1/28/2023 at 4:25 PM

## 2023-01-28 NOTE — PROGRESS NOTES
4 Eyes Skin Assessment     NAME:  Mariel Mckenna OF BIRTH:  1945  MEDICAL RECORD NUMBER:  [id number]    The patient is being assessed for  Admission    I agree that One RN have performed a thorough Head to Toe Skin Assessment on the patient. ALL assessment sites listed below have been assessed. Areas assessed by both nurses:    Head, Face, Ears, Shoulders, Back, Chest, Arms, Elbows, Hands, Sacrum. Buttock, Coccyx, Ischium, and Legs. Feet and Heels        Does the Patient have a Wound?  No noted wound(s)       Andre Prevention initiated by RN: No   Wound Care Orders initiated by RN: No    Pressure Injury (Stage 3,4, Unstageable, DTI, NWPT, and Complex wounds) if present place referral order by RN under : No    New and Established Ostomies, if present place, referral order under : No      Nurse 1 eSignature: Electronically signed by Jf Beltran RN on 1/28/23 at 6:31 PM EST    **SHARE this note so that the co-signing nurse is able to place an eSignature**    Nurse 2 eSignature: Electronically signed by Faustino Abbasi RN on 1/28/23 at 6:32 PM EST

## 2023-01-28 NOTE — ED NOTES
The following labs were labeled with appropriate pt sticker and tubed to lab:     [] Blue     [] Lavender   [] on ice  [] Green/yellow  [] Green/black [] on ice  [] Crissy Alfaro  [] on ice  [] Yellow  [] Red  [] Type/ Screen  [] ABG  [] VBG    [] COVID-19 swab    [] Rapid  [] PCR  [] Flu swab  [] Peds Viral Panel     [x] Urine Sample  [] Fecal Sample  [] Pelvic Cultures  [] Blood Cultures  [] X 2  [] STREP Cultures         Wisam Wilson, JANNETH  01/28/23 9154

## 2023-01-28 NOTE — ED NOTES
ED TO INPATIENT SBAR HANDOFF    Patient Name: Dolores Mg   :  1945  78 y.o. MRN:  [id number]  Preferred Name    ED Room #:  TR04/04TR-04  Family/Caregiver Present yes   Restraints no   Sitter no   Sepsis Risk Score Sepsis Risk Score: 1.94    Situation  Code Status: FULL CODE. Allergies: Lisinopril, Zetia [ezetimibe], Atorvastatin, Codeine, Hydrochlorothiazide, Hydroxyzine, Lexapro [escitalopram oxalate], and Pravastatin  Weight: No data found. Arrived from: home  Chief Complaint:   Chief Complaint   Patient presents with    Altered Mental [place] Problem/Diagnosis:  Principal Problem:    Hepatic encephalopathy  Resolved Problems:    * No resolved hospital problems. *    Imaging:   XR CHEST PORTABLE   Final Result   Increasing opacity throughout the right hemithorax, which may reflect   increasing layering effusion and/or additional airspace disease. Masslike opacity in the right upper lung again demonstrated. CT Head W/O Contrast   Final Result   No acute intracranial abnormality.          CT CHEST W CONTRAST    (Results Pending)     Abnormal labs:   Abnormal Labs Reviewed   CBC WITH AUTO DIFFERENTIAL - Abnormal; Notable for the following components:       Result Value    RBC 3.94 (*)     Hemoglobin 12.4 (*)     Hematocrit 38.0 (*)     MCH 31.5 (*)     Segs Relative 70.7 (*)     Lymphocytes % 7.3 (*)     Monocytes % 7.4 (*)     Eosinophils % 13.1 (*)     Basophils % 1.1 (*)     All other components within normal limits   COMPREHENSIVE METABOLIC PANEL - Abnormal; Notable for the following components:    Potassium 3.2 (*)     Calcium 7.1 (*)     Albumin 2.6 (*)     Total Protein 5.7 (*)     All other components within normal limits   AMMONIA - Abnormal; Notable for the following components:    Ammonia 119 (*)     All other components within normal limits   TROPONIN - Abnormal; Notable for the following components:    Troponin T 0.014 (*)     All other components within normal limits   URINALYSIS - Abnormal; Notable for the following components:    Blood, Urine TRACE (*)     All other components within normal limits   BLOOD GAS, VENOUS - Abnormal; Notable for the following components:    pCO2, Jamaal 54 (*)     pO2, Jamaal 62 (*)     Base Exc, Mixed 7.8 (*)     HCO3, Venous 34.2 (*)     O2 Sat, Jamaal 89.0 (*)     All other components within normal limits   PROTIME/INR & PTT - Abnormal; Notable for the following components:    aPTT 37.5 (*)     All other components within normal limits     Critical values: no     Abnormal Assessment Findings:     Background  History:   Past Medical History:   Diagnosis Date    Anxiety     Cirrhosis, alcoholic (HCC)     Diabetes mellitus (HCC)     GERD (gastroesophageal reflux disease) 2000    GERD (gastroesophageal reflux disease)     Hyperlipidemia     Hypertension     Portal hypertension (HCC)     Pulmonary nodules     Sleep apnea     SOB (shortness of breath)     Thyroid disease        Assessment    Vitals/MEWS: MEWS Score: 1  Level of Consciousness: New confusion or agitation (1)   Vitals:    01/28/23 1334 01/28/23 1338 01/28/23 1559 01/28/23 1643   BP:  (!) 159/77 (!) 159/137 111/70   Pulse: 79  82 88   Resp:  15 17 14   Temp: 98.7 °F (37.1 °C)   98.4 °F (36.9 °C)   SpO2:  100%       FiO2 (%):   O2 Flow Rate: O2 Device: Nasal cannula O2 Flow Rate (L/min): 2 L/min  Cardiac Rhythm: NSR  Pain Assessment:  [] Verbal [] [place] Scale  Pain Scale:  N/A  Last documented pain score (0-10 scale)    Last documented pain medication administered:   Mental Status: disoriented  NIH Score: NIH     C-SSRS: Risk of Suicide: No Risk  Bedside swallow:    Hali Coma Scale (GCS): [place] Coma Scale  Eye Opening: Spontaneous  Best Verbal Response: Confused  Best Motor Response: Obeys commands  Hali Coma Scale Score: 14  Active LDA's:   Peripheral IV 01/28/23 Left Antecubital (Active)   Site Assessment Clean, dry & intact 01/28/23 1428   Line Status Blood return noted 01/28/23 1428   Phlebitis Assessment No symptoms 01/28/23 1428   Infiltration Assessment 0 01/28/23 1428     PO Status: Regular  Pertinent or High Risk Medications/Drips: no   o If Yes, please provide details:   Pending Blood Product Administration: no     You may also review the ED PT Care Timeline found under the Summary Nursing Index tab. Recommendation    Pending orders n/a  Plan for Discharge (if known):    Additional Comments:    If any further questions, please call Sending RN at 0900    Electronically signed by: Electronically signed by Tavares Christopher RN on 1/28/2023 at 4:44 PM     Tavares Christopher RN  01/28/23 [address]

## 2023-01-28 NOTE — ED PROVIDER NOTES
Triage Chief Complaint:    Altered Mental Status    HPI   Cindy Leiva is a 78 y.o. male that presents this patient coming today for evaluation of confusion. He is not able to provide any history. Reportedly was relatively normal yesterday morning but has been progressively declining over the last day. No other reports. No recent falls per history. Patient is unreliable historian at this time. He only says yes when asking any questions. Patient's wife did show up afterwards and stated this to the family. Previously. He has been doing well at home compliant with his medications no recent falls or injuries monitored closely not been complaining about anything recently. History from : Family spouse and EMS    Limitations to history : Altered Mental Status    ROS:  10 systems reviewed and negative except as above. Past Medical History:   Diagnosis Date    Anxiety     Cirrhosis, alcoholic ([address]     Diabetes mellitus ([facility] [address]     GERD (gastroesophageal reflux disease) 2000    GERD (gastroesophageal reflux disease)     Hyperlipidemia     Hypertension     Portal hypertension (HCC)     Pulmonary nodules     Sleep apnea     SOB (shortness of breath)     Thyroid disease      Past Surgical History:   Procedure Laterality Date    APPENDECTOMY      CHOLECYSTECTOMY      COLONOSCOPY      ENDOSCOPY, COLON, DIAGNOSTIC      FOOT SURGERY      needle removed,not sure which foot, per wife.     TONSILLECTOMY      UPPER GASTROINTESTINAL ENDOSCOPY N/A 1/19/2022    EGD BIOPSY performed by Surekha Cunningham MD at [address] [place] [address] Rd       Family History   Problem Relation Age of Onset    Hypertension Brother     Diabetes Other      Social History     Socioeconomic History    Marital status:      Spouse name: Not on file    Number of children: Not on file    Years of education: Not on file    Highest education level: Not on file   Occupational History     Comment: Retired    Tobacco Use    Smoking status: Former     Packs/day: 1.00     Years: 14.00     Pack years: 14.00     Types: Cigarettes     Quit date: 12     Years since quittin.1    Smokeless tobacco: Never   Vaping Use    Vaping Use: Never used   Substance and Sexual Activity    Alcohol use: Not Currently    Drug use: Never    Sexual activity: Yes     Partners: Female   Other Topics Concern    Not on file   Social History Narrative    Not on file     Social Determinants of Health     Financial Resource Strain: Low Risk     Difficulty of Paying Living Expenses: Not hard at all   Food Insecurity: No Food Insecurity    Worried About Running Out of Food in the Last Year: Never true    Ran Out of Food in the Last Year: Never true   Transportation Needs: Not on file   Physical Activity: Not on file   Stress: Not on file   Social Connections: Not on file   Intimate Partner Violence: Not on file   Housing Stability: Not on file     Current Facility-Administered Medications   Medication Dose Route Frequency Provider Last Rate Last Admin    lactulose ([address]) 10 GM/15ML solution 20 g  20 g Oral Once Jamison Rosas MD         Current Outpatient Medications   Medication Sig Dispense Refill    rifAXIMin (XIFAXAN) 550 MG tablet Take 1 tablet by mouth 2 times daily 42 tablet 0    thiamine 100 MG tablet Take 1 tablet by mouth daily 30 tablet 3    furosemide (LASIX) 40 MG tablet Take 1 tablet by mouth daily 60 tablet 3    levothyroxine (SYNTHROID) 50 MCG tablet Take 1 tablet by mouth Daily 90 tablet 1    albuterol sulfate HFA (PROVENTIL;VENTOLIN;PROAIR) 108 (90 Base) MCG/ACT inhaler INHALE 2 PUFFS BY MOUTH 4 TIMES DAILY AS NEEDED FOR WHEEZING 1 each 2    DULoxetine (CYMBALTA) 60 MG extended release capsule TAKE 1 CAPSULE BY MOUTH ONCE DAILY 90 capsule 1    betamethasone valerate (VALISONE) 0.1 % lotion Apply topically 2 times daily prn scalp rash 60 mL 3    spironolactone (ALDACTONE) 100 MG tablet Take 100 mg by mouth 2 times daily      nadolol (CORGARD) 40 MG tablet Take 1 tablet by mouth in the morning. (Patient taking differently: Take 40 mg by mouth nightly) 90 tablet 3    lactulose (CHRONULAC) 10 GM/15ML solution Take 30 mLs by mouth 3 times daily Hold if having diarrhea 2700 mL 5    Multiple Vitamin (MULTI VITAMIN MENS PO) Take 1 tablet by mouth daily      ESOMEPRAZOLE MAGNESIUM PO Take 20 mg by mouth daily OTC      Omega-3 1000 MG CAPS Take 2,000 mg by mouth daily       Allergies   Allergen Reactions    Lisinopril Swelling     Tongue swelling      Zetia [Ezetimibe] Swelling     Facial swelling    Atorvastatin     Codeine Other (See Comments)     Blood pressure drops    Hydrochlorothiazide     Hydroxyzine      Fatigue and depressed    Lexapro [Escitalopram Oxalate]      Increased depression    Pravastatin        Nursing Notes Reviewed    Physical Exam:     ED Triage Vitals   Enc Vitals Group      BP 01/28/23 1338 (!) 159/77      Heart Rate 01/28/23 1334 79      Resp 01/28/23 1338 15      Temp 01/28/23 1334 98.7 °F (37.1 °C)      Temp src --       SpO2 01/28/23 1338 100 %      Weight --       Height --       Head Circumference --       Peak Flow --       Pain Score --       Pain Loc --       Pain Edu? --       Excl. in [address]? --        My pulse ox interpretation is - normal    General appearance:  No acute distress. Skin:  Warm. Dry. Eye:  Extraocular movements intact. PERRLA     Ears, nose, mouth and throat:  Oral mucosa moist   Neck:  Trachea midline. Extremity:  No swelling. Normal ROM     Heart:  Regular rate and rhythm. Perfusion:  intact  Respiratory:  Lungs clear to auscultation bilaterally. Respirations nonlabored. Abdominal:  Normal bowel sounds. Soft. Nontender. Distended.   Back:  No CVA tenderness to palpation     Neurological: Patient is slightly lethargic but easily arousable seems to have strength in all 4 extremities but difficult exam given that he is confused          Psychiatric: Withdrawn, limited secondary to his confusion      I have reviewed and interpreted all of the currently available lab results from this visit (if applicable):  Results for orders placed or performed during the hospital encounter of 01/28/23   CBC with Auto Differential   Result Value Ref Range    WBC 8.1 4.0 - 10.5 K/CU MM    RBC 3.94 (L) 4.6 - 6.2 M/CU MM    Hemoglobin 12.4 (L) 13.5 - 18.0 GM/DL    Hematocrit 38.0 (L) 42 - 52 %    MCV 96.4 78 - 100 FL    MCH 31.5 (H) 27 - 31 PG    MCHC 32.6 32.0 - 36.0 %    RDW 13.9 11.7 - 14.9 %    Platelets 991 992 - 746 K/CU MM    MPV 9.8 7.5 - 11.1 FL    Differential Type AUTOMATED DIFFERENTIAL     Segs Relative 70.7 (H) 36 - 66 %    Lymphocytes % 7.3 (L) 24 - 44 %    Monocytes % 7.4 (H) 0 - 4 %    Eosinophils % 13.1 (H) 0 - 3 %    Basophils % 1.1 (H) 0 - 1 %    Segs Absolute 5.7 K/CU MM    Lymphocytes Absolute 0.6 K/CU MM    Monocytes Absolute 0.6 K/CU MM    Eosinophils Absolute 1.1 K/CU MM    Basophils Absolute 0.1 K/CU MM    Nucleated RBC % 0.0 %    Total Nucleated RBC 0.0 K/CU MM    Total Immature Neutrophil 0.03 K/CU MM    Immature Neutrophil % 0.4 0 - 0.43 %   CMP   Result Value Ref Range    Sodium 138 135 - 145 MMOL/L    Potassium 3.2 (L) 3.5 - 5.1 MMOL/L    Chloride 106 99 - 110 mMol/L    CO2 24 21 - 32 MMOL/L    BUN 23 6 - 23 MG/DL    Creatinine 1.2 0.9 - 1.3 MG/DL    Est, Glom Filt Rate >60 >60 mL/min/1.73m2    Glucose 91 70 - 99 MG/DL    Calcium 7.1 (L) 8.3 - 10.6 MG/DL    Albumin 2.6 (L) 3.4 - 5.0 GM/DL    Total Protein 5.7 (L) 6.4 - 8.2 GM/DL    Total Bilirubin 0.4 0.0 - 1.0 MG/DL    ALT 17 10 - 40 U/L    AST 29 15 - 37 IU/L    Alkaline Phosphatase 80 40 - 129 IU/L    Anion Gap 8 4 - 16   Lactic Acid Now and in 2 Hours   Result Value Ref Range    Lactate 0.9 0.5 - 1.9 mMOL/L   TSH   Result Value Ref Range    TSH, High Sensitivity 1.640 0.270 - 4.20 uIu/ml   ETOH   Result Value Ref Range    Alcohol Scrn <0.01 <0.01 %WT/VOL   Ammonia (select if history of liver disease or alcohol abuse)   Result Value Ref Range Ammonia 119 (H) 16 - 60 UMOL/L   Troponin   Result Value Ref Range    Troponin T 0.014 (H) <0.01 NG/ML   Blood Gas, Venous   Result Value Ref Range    pH, Jamaal 7.41 7.32 - 7.42    pCO2, Jamaal 54 (H) 38 - 52 mmHG    pO2, Jamaal 62 (H) 28 - 48 mmHG    Base Exc, Mixed 7.8 (H) 0 - 1.2    HCO3, Venous 34.2 (H) 19 - 25 MMOL/L    O2 Sat, Jamaal 89.0 (H) 50 - 70 %    Comment VBG    EKG 12 Lead   Result Value Ref Range    Ventricular Rate 81 BPM    Atrial Rate 81 BPM    P-R Interval 140 ms    QRS Duration 62 ms    Q-T Interval 380 ms    QTc Calculation (Bazett) 441 ms    P Axis 0 degrees    R Axis 29 degrees    T Axis 20 degrees    Diagnosis       Sinus rhythm with fusion complexes  Possible Left atrial enlargement  Low voltage QRS  Cannot rule out Anterior infarct , age undetermined  Abnormal ECG  When compared with ECG of 05-JAN-2023 00:59,  fusion complexes are now present        Radiographs (if obtained):  [] The following radiograph was interpreted by myself in the absence of a radiologist:   [x] Radiologist's Report Reviewed:  CT Head W/O Contrast   Final Result   No acute intracranial abnormality. XR CHEST PORTABLE    (Results Pending)       Procedures:  12 lead EKG per my interpretation:  Normal Sinus Rhythm  Rate: 81  QTc is  normal  There are no T wave changes appreciated. There are no ST wave changes appreciated. Prior EKG to compare with was available and is similar    (All EKG's are interpreted by myself in the absence of a cardiologist)      Chart review shows recent radiographs:  XR CHEST (2 VW)    Result Date: 1/24/2023  EXAMINATION: TWO XRAY VIEWS OF THE CHEST 1/23/2023 2:01 pm COMPARISON: 01/06/2023 HISTORY: ORDERING SYSTEM PROVIDED HISTORY: Recurrent pleural effusion on right FINDINGS: Heart size stable. Right pleural effusion and right midlung opacities persist.  Left lung remains clear. No pneumothorax.      Persistent right pleural effusion with right mid lung opacities suggesting atelectasis     CT HEAD WO CONTRAST    Result Date: 1/4/2023  EXAMINATION: CT OF THE HEAD WITHOUT CONTRAST  1/4/2023 12:44 pm TECHNIQUE: CT of the head was performed without the administration of intravenous contrast. Automated exposure control, iterative reconstruction, and/or weight based adjustment of the mA/kV was utilized to reduce the radiation dose to as low as reasonably achievable. COMPARISON: 11/12/2022, multiple prior head CTs dating back to 02/11/2022 HISTORY: ORDERING SYSTEM PROVIDED HISTORY: ams TECHNOLOGIST PROVIDED HISTORY: Reason for exam:->ams Has a \"code stroke\" or \"stroke alert\" been called? ->No Decision Support Exception - unselect if not a suspected or confirmed emergency medical condition->Emergency Medical Condition (MA) Reason for Exam: ams FINDINGS: BRAIN/VENTRICLES: There is no acute intracranial hemorrhage, mass effect or midline shift. No abnormal extra-axial fluid collection. The gray-white differentiation is maintained without evidence of an acute infarct. There is no evidence of hydrocephalus. Mild cortical atrophy, white matter changes consistent with microvascular degenerative disease, atherosclerotic calcification in the distal internal carotid arteries bilaterally and distal right vertebral artery uncertain hemodynamic significance. ORBITS: The visualized portion of the orbits demonstrate no acute abnormality. SINUSES: The visualized paranasal sinuses and mastoid air cells demonstrate no acute abnormality. SOFT TISSUES/SKULL:  No acute abnormality of the visualized skull or soft tissues. No noncontrast CT evidence of acute intracranial pathology. No CT etiology for patient's clinical complaint of altered mental status noted on this study.  Cortical atrophy, white matter changes consistent with microvascular degenerative disease, atherosclerotic calcification of the distal internal carotid and right vertebral arteries of uncertain hemodynamic significance     XR CHEST PORTABLE    Result Date: 1/8/2023  EXAMINATION: ONE XRAY VIEW OF THE CHEST 1/6/2023 4:16 pm COMPARISON: 01/04/2023 HISTORY: ORDERING SYSTEM PROVIDED HISTORY: post thoracentesis TECHNOLOGIST PROVIDED HISTORY: Reason for exam:->post thoracentesis Reason for Exam: post thoracentesis Additional signs and symptoms: na Relevant Medical/Surgical History: diabetes, hypertension Initial evaluation FINDINGS: Monitor wires overlie the chest.  The trachea is midline. The cardiac silhouette is unremarkable. The left lung is clear. There is consolidation of the right upper lobe. There is a right pleural effusion and atelectasis or infiltrate in the right lung base. Follow up to resolution is suggested. No obvious pneumothorax. Persistent right pleural effusion and airspace disease in the right upper lung field and right lung base. No significant change from the prior study. Follow up to resolution is suggested. XR CHEST PORTABLE    Result Date: 1/4/2023  EXAMINATION: ONE XRAY VIEW OF THE CHEST 1/4/2023 12:42 pm COMPARISON: 12/09/2022 HISTORY: ORDERING SYSTEM PROVIDED HISTORY: Chest Pain TECHNOLOGIST PROVIDED HISTORY: Reason for exam:->Chest Pain Reason for Exam: chest pain FINDINGS: Heart size stable. Left lung clear. Similar appearing pleural and parenchymal opacities throughout the right hemithorax. No pneumothorax. There appears to be a persistent right pleural fluid and parenchymal opacification the right lung suggesting atelectasis     IR US GUIDED PARACENTESIS W IMAGING    Result Date: 1/24/2023  PROCEDURE: PARACENTESIS WITHOUT IMAGE GUIDANCE US ABDOMEN LIMITED 1/24/2023 HISTORY: ORDERING SYSTEM PROVIDED HISTORY: Cirrhosis of liver with ascites, unspecified hepatic cirrhosis type [facility]) TECHNOLOGIST PROVIDED HISTORY: Remove as much fluid as possible; please send fluid for:  cell count and diff, culture and sensitivity. Please give 6-8 grams of Albumin IV for each liter of fluid removed.   Please inoculate fluid immediately into aerobic and anaerobic blood culture bottles for the culture specimen. Reason for exam:->persistent ascites secondary to decompensated cirrhosis TECHNIQUE: Maximum sterile barrier technique including hand hygiene, skin prep and sterile ultrasound technique utilized for procedure. Sterile ultrasound technique also utilized for procedure Ultrasound guidance required for procedure to confirm presence of ascites, puncture site selection, real-time intra procedural guidance. Images made for patient's medical file. Informed consent was obtained after a detailed explanation of the procedure including risks, benefits, and alternatives. Universal protocol was followed. A limited ultrasound of the abdomen was performed. The right abdomen was prepped and draped in sterile fashion and local anesthesia was achieved with lidocaine. An 6 Japanese needle sheath was advanced into ascites and paracentesis was performed. The patient tolerated the procedure well. FINDINGS: Limited ultrasound of the abdomen demonstrates ascites with paracentesis catheter. A total of 98414 cc cloudy yellow fluid was removed. 1000 cc specimen sent for laboratory evaluation. 75 g intravenous albumin utilized for procedure. Successful ultrasound-guided paracentesis with specimen sent for laboratory evaluation.      IR US GUIDED PARACENTESIS    Result Date: 1/5/2023  PROCEDURE: PARACENTESIS WITHOUT IMAGE GUIDANCE US ABDOMEN LIMITED 1/5/2023 HISTORY: ORDERING SYSTEM PROVIDED HISTORY: us- guided paracentesis TECHNOLOGIST PROVIDED HISTORY: Remove as much fluid as possible Please send fluid for: cell count and diff, culture and sensitivity, total protein, albumin Please give 6-8 grams albumin IV for each liter fluid removed Please innoculate fluid immediately into aerobic and non-aerobic blood culture bottles for the culture specimen Reason for exam:->us- guided paracentesis TECHNIQUE: Maximum sterile barrier technique including hand hygiene, skin prep and sterile ultrasound technique utilized for procedure. Sterile ultrasound technique also utilized for procedure Ultrasound guidance required for procedure to confirm presence of ascites, puncture site selection, real-time intra procedural guidance. Images made for patient's medical file. Informed consent was obtained after a detailed explanation of the procedure including risks, benefits, and alternatives. Universal protocol was followed. A limited ultrasound of the abdomen was performed. The right abdomen was prepped and draped in sterile fashion and local anesthesia was achieved with lidocaine. An 6 Omani needle sheath was advanced into ascites and paracentesis was performed. The patient tolerated the procedure well. FINDINGS: Limited ultrasound of the abdomen demonstrates ascites with the paracentesis catheter within it. A total of 4620 mL cloudy white chylous appearing fluid was removed. 1020 mL sample sent for laboratory evaluation. 25 g intravenous albumin utilized for procedure. No complication suggested. Successful ultrasound-guided paracentesis with specimen sent for laboratory evaluation. CTA HEAD NECK W CONTRAST    Result Date: 1/4/2023  EXAMINATION: CTA OF THE HEAD AND NECK WITH CONTRAST 1/4/2023 5:09 pm: TECHNIQUE: CTA of the head and neck was performed with the administration of intravenous contrast. Multiplanar reformatted images are provided for review. MIP images are provided for review. Stenosis of the internal carotid arteries measured using NASCET criteria. Automated exposure control, iterative reconstruction, and/or weight based adjustment of the mA/kV was utilized to reduce the radiation dose to as low as reasonably achievable. COMPARISON: None. HISTORY: ORDERING SYSTEM PROVIDED HISTORY: ams TECHNOLOGIST PROVIDED HISTORY: Reason for exam:->ams Has a \"code stroke\" or \"stroke alert\" been called? ->No Decision Support Exception - unselect if not a suspected or confirmed emergency medical condition->Emergency Medical Condition (MA) Reason for Exam: ams Additional signs and symptoms: no Relevant Medical/Surgical History: 80 ml isovue 370 used FINDINGS: CTA NECK: AORTIC ARCH/ARCH VESSELS: No dissection or arterial injury. No significant stenosis of the brachiocephalic or subclavian arteries. CAROTID ARTERIES: No dissection, arterial injury, or hemodynamically significant stenosis by NASCET criteria. VERTEBRAL ARTERIES: No dissection, arterial injury, or significant stenosis. SOFT TISSUES: There is a large right pleural effusion with a partially visualized mass within the right upper lobe measuring at least 6.6 x 5.8 cm in diameter. Mild mediastinal adenopathy is also noted. BONES: No acute osseous abnormality. CTA HEAD: ANTERIOR CIRCULATION: The anterior and middle cerebral arteries demonstrate normal arborization patterns. There is asymmetric narrowing and attenuation of the left MCA and branch vessels. No aneurysm. POSTERIOR CIRCULATION: No significant stenosis of the vertebral, basilar, or posterior cerebral arteries. No aneurysm. OTHER: No dural venous sinus thrombosis on this non-dedicated study. BRAIN: See separately dictated noncontrast head CT report. No large vessel occlusion visualized within the head or neck. Asymmetric narrowing and attenuation of the left MCA and branch vessels. Recommend digital subtraction angiography for further evaluation. Incidentally noted pleural effusion and partially visualized right upper lobe mass. Please refer to the separately dictated CT chest report for further details and management recommendations. CT CHEST ABDOMEN PELVIS W CONTRAST Additional Contrast? None    Result Date: 1/4/2023  EXAMINATION: CT OF THE CHEST, ABDOMEN, AND PELVIS WITH CONTRAST 1/4/2023 5:11 pm TECHNIQUE: CT of the chest, abdomen and pelvis was performed with the administration of intravenous contrast. Multiplanar reformatted images are provided for review. Automated exposure control, iterative reconstruction, and/or weight based adjustment of the mA/kV was utilized to reduce the radiation dose to as low as reasonably achievable. COMPARISON: September 13, 2021 HISTORY: ORDERING SYSTEM PROVIDED HISTORY: hx of liver cirrhosis TECHNOLOGIST PROVIDED HISTORY: Reason for exam:->hx of liver cirrhosis Additional Contrast?->None Reason for Exam: hx of liver cirrhosis. sob Additional signs and symptoms: no Relevant Medical/Surgical History: 80 ml isovue 370 used FINDINGS: Chest: Mediastinum: Thoracic aorta is nonaneurysmal.  No evidence of dissection. Heart and pericardium appear unremarkable. Scattered mediastinal nodes but no evidence of significant lymphadenopathy. Largest paratracheal node measures up to 8.1 mm. Main pulmonary artery is enlarged measuring 3.2 cm but no evidence of acute pulmonary embolism. Lungs/pleura: A large right pleural effusion is noted. 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 Atelectatic changes seen in the right upper and right lower lobes with collapse of the entire right lower lobe. Left lung demonstrates no active infiltrates. Minimal atelectatic changes. Soft Tissues/Bones: No acute abnormality. Abdomen/Pelvis: Organs: The liver demonstrates severe cirrhosis with nodularity and shrinkage. No focal masses. Gallbladder is not seen and presumably removed. Pancreas and spleen appear unremarkable. No evidence of splenomegaly at this time. Adrenals, kidneys, aorta and IVC appear stable. Aorta is calcified but nonaneurysmal. GI/Bowel: No evidence of bowel obstruction or perforation. Mild constipation and stool impaction in the rectum. Small bowel appears unremarkable. No evidence of significant varices. Pelvis: Urinary bladder, prostate and seminal vesicles appear stable. Scattered bilateral pelvic nodes which are not significantly enlarged. Left inguinal hernia containing ascitic fluid.  Peritoneum/Retroperitoneum: Moderate ascites and mesenteric congestion. Retroperitoneal lymphadenopathy with nodes measuring up to 1.4 cm in the aortocaval region. This was previously seen as well. Bones/Soft Tissues: No acute abnormality. 1. Large right pleural effusion and atelectatic changes in the right upper and right lower lobe with significantly compromised aeration in the right lung. 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 Minimal atelectatic changes left lower lobe. 2. Prominent main pulmonary artery measuring 3.2 cm but no acute pulmonary embolism. Aorta is nonaneurysmal and there is no evidence of dissection. 3. Moderate ascites in the abdomen and pelvis. Mesenteric congestion. 4. Hepatic cirrhosis with nodularity and some shrinkage but no focal abnormality. 5. Redemonstration of retroperitoneal lymphadenopathy. 6. Left inguinal hernia containing ascitic fluid. IR GUIDED THORACENTESIS PLEURAL    Result Date: 1/6/2023  PROCEDURE: ULTRASOUNDGUIDED RIGHT THORACENTESIS 1/6/2023 HISTORY: ORDERING SYSTEM PROVIDED HISTORY: pleural effusion TECHNOLOGIST PROVIDED HISTORY: Reason for exam:->pleural effusion Which side should the procedure be performed?->Right TECHNIQUE: Maximum sterile barrier technique including hand hygiene, skin prep and sterile ultrasound technique utilized for procedure. Sterile ultrasound technique also utilized for procedure Ultrasound guidance required to confirm presence of pleural fluid, puncture site selection, real-time intra procedural guidance. Images made for patient's medical file. Informed consent was obtained after a detailed explanation of the procedure including risks, benefits, and alternatives. Universal protocol was performed. The right chest was prepped and draped in sterile fashion and local anesthesia was achieved with lidocaine. An 5 Khmer needle sheath was advanced under ultrasound guidance into pleural effusion and thoracentesis was performed. The patient tolerated the procedure well.  FINDINGS: Pre and intraprocedural images demonstrate large amount of right-sided pleural fluid with thoracentesis catheter within it. A total of 2000 mL cloudy white fluid was removed from the right hemithorax which appears similar to appearance of paracentesis fluid specimens. No complication suggested. Postprocedure chest radiograph ordered. Successful ultrasound guided right thoracentesis as described. MDM:     Discussion with Other Professionals : Admitting Team JULIO, Dr. Tanja Day    Social Determinants : None    Records Reviewed : Inpatient Notes from prior visit showed similar presentation and it appeared that this was related to hepatic encephalopathy    Chronic conditions affecting care: HTN, DM, HLD, hypoxia, alcohol use disorder, cirrhosis, hearing loss    Labs ordered and results as above, (interpreted by myself) concerning for hyperammonemia. Troponin was slightly elevated however EKG did not show any acute irregularities per my interpretation, mild anemia but close to baseline, Imaging interpreted and reviewed by myself: CXR and CT Head showed no acute irregularities, and EKG interpreted by myself as above, not acutely concerning    Patient was given the following medications:  Medications   lactulose (CHRONULAC) 10 GM/15ML solution 20 g (has no administration in time range)       Disposition Discussion:  I suspect that this is likely secondary to cirrhosis. My plan is to give lactulose and hospitalize the patient for further care. Patient was accompanied by spouse who was agreeable with plan of care. I deferred antibiotics but did draw blood cultures given the recent bacteremia. The patient is not appearing septic here. CT imaging of the head did not reveal any acute irregularities but CVA is still part of differential as is an acute infection. Discussed the case with  team for hospitalization.     Disposition: Hospitalized     I am the primary physician of record    Clinical Impression:  1. Acute metabolic encephalopathy    2. Hyperammonemia (HCC)      Disposition referral (if applicable):  No follow-up provider specified. Disposition medications (if applicable):  New Prescriptions    No medications on file       Comment: Please note this report has been produced using speech recognition software and may contain errors related to that system including errors in grammar, punctuation, and spelling, as well as words and phrases that may be inappropriate. If there are any questions or concerns please feel free to contact the dictating provider for clarification.        Queta Allred MD  01/28/23 1053

## 2023-01-28 NOTE — CARE COORDINATION
[facility] [place]  reviewed at this time, criteria supports Inpatient Admission.  TAHIRA,RN/CM

## 2023-01-28 NOTE — ED NOTES
The following labs were labeled with appropriate pt sticker and tubed to lab:     [x] Blue     [x] Lavender   [] on ice  [x] Green/yellow  [x] Green/black [] on ice  [x] Grey  [x] on ice  [] Yellow  [x] Red  [] Type/ Screen  [] ABG  [x] VBG    [] COVID-19 swab    [] Rapid  [] PCR  [] Flu swab  [] Peds Viral Panel     [] Urine Sample  [] Fecal Sample  [] Pelvic Cultures  [x] Blood Cultures  [x] X 2  [] STREP Cultures                Bassam Louis RN  01/28/23 0130

## 2023-01-29 LAB
ADENOVIRUS DETECTION BY PCR: NOT DETECTED
ALBUMIN SERPL-MCNC: 3.3 GM/DL (ref 3.4–5)
ALP BLD-CCNC: 109 IU/L (ref 40–128)
ALT SERPL-CCNC: 24 U/L (ref 10–40)
ANION GAP SERPL CALCULATED.3IONS-SCNC: 11 MMOL/L (ref 4–16)
AST SERPL-CCNC: 47 IU/L (ref 15–37)
BASE EXCESS MIXED: 4.5 (ref 0–1.2)
BASOPHILS ABSOLUTE: 0.1 K/CU MM
BASOPHILS RELATIVE PERCENT: 1.4 % (ref 0–1)
BILIRUB SERPL-MCNC: 0.9 MG/DL (ref 0–1)
BORDETELLA PARAPERTUSSIS BY PCR: NOT DETECTED
BORDETELLA PERTUSSIS PCR: NOT DETECTED
BUN BLDV-MCNC: 29 MG/DL (ref 6–23)
CALCIUM SERPL-MCNC: 10.2 MG/DL (ref 8.3–10.6)
CHLAMYDOPHILA PNEUMONIA PCR: NOT DETECTED
CHLORIDE BLD-SCNC: 101 MMOL/L (ref 99–110)
CO2: 27 MMOL/L (ref 21–32)
COMMENT: ABNORMAL
CORONAVIRUS 229E PCR: NOT DETECTED
CORONAVIRUS HKU1 PCR: NOT DETECTED
CORONAVIRUS NL63 PCR: NOT DETECTED
CORONAVIRUS OC43 PCR: NOT DETECTED
CREAT SERPL-MCNC: 1.7 MG/DL (ref 0.9–1.3)
DIFFERENTIAL TYPE: ABNORMAL
EOSINOPHILS ABSOLUTE: 0.8 K/CU MM
EOSINOPHILS RELATIVE PERCENT: 12.9 % (ref 0–3)
GFR SERPL CREATININE-BSD FRML MDRD: 42 ML/MIN/1.73M2
GLUCOSE BLD-MCNC: 104 MG/DL (ref 70–99)
GLUCOSE BLD-MCNC: 110 MG/DL (ref 70–99)
GLUCOSE BLD-MCNC: 132 MG/DL (ref 70–99)
GLUCOSE BLD-MCNC: 156 MG/DL (ref 70–99)
GLUCOSE BLD-MCNC: 75 MG/DL (ref 70–99)
HCO3 VENOUS: 30.4 MMOL/L (ref 19–25)
HCT VFR BLD CALC: 38.5 % (ref 42–52)
HEMOGLOBIN: 12.6 GM/DL (ref 13.5–18)
HUMAN METAPNEUMOVIRUS PCR: NOT DETECTED
IMMATURE NEUTROPHIL %: 0.3 % (ref 0–0.43)
INFLUENZA A BY PCR: NOT DETECTED
INFLUENZA A H1 (2009) PCR: NOT DETECTED
INFLUENZA A H1 PANDEMIC PCR: NOT DETECTED
INFLUENZA A H3 PCR: NOT DETECTED
INFLUENZA B BY PCR: NOT DETECTED
LYMPHOCYTES ABSOLUTE: 0.6 K/CU MM
LYMPHOCYTES RELATIVE PERCENT: 9.8 % (ref 24–44)
MCH RBC QN AUTO: 31.3 PG (ref 27–31)
MCHC RBC AUTO-ENTMCNC: 32.7 % (ref 32–36)
MCV RBC AUTO: 95.8 FL (ref 78–100)
MONOCYTES ABSOLUTE: 0.6 K/CU MM
MONOCYTES RELATIVE PERCENT: 9.5 % (ref 0–4)
MYCOPLASMA PNEUMONIAE PCR: NOT DETECTED
NUCLEATED RBC %: 0 %
O2 SAT, VEN: 88.7 % (ref 50–70)
PARAINFLUENZA 1 PCR: NOT DETECTED
PARAINFLUENZA 2 PCR: NOT DETECTED
PARAINFLUENZA 3 PCR: NOT DETECTED
PARAINFLUENZA 4 PCR: NOT DETECTED
PCO2, VEN: 49 MMHG (ref 38–52)
PDW BLD-RTO: 14.1 % (ref 11.7–14.9)
PH VENOUS: 7.4 (ref 7.32–7.42)
PLATELET # BLD: 243 K/CU MM (ref 140–440)
PMV BLD AUTO: 10 FL (ref 7.5–11.1)
PO2, VEN: 65 MMHG (ref 28–48)
POTASSIUM SERPL-SCNC: 4.5 MMOL/L (ref 3.5–5.1)
RBC # BLD: 4.02 M/CU MM (ref 4.6–6.2)
RHINOVIRUS ENTEROVIRUS PCR: NOT DETECTED
RSV PCR: NOT DETECTED
SARS-COV-2: NOT DETECTED
SEGMENTED NEUTROPHILS ABSOLUTE COUNT: 3.9 K/CU MM
SEGMENTED NEUTROPHILS RELATIVE PERCENT: 66.1 % (ref 36–66)
SODIUM BLD-SCNC: 139 MMOL/L (ref 135–145)
TOTAL IMMATURE NEUTOROPHIL: 0.02 K/CU MM
TOTAL NUCLEATED RBC: 0 K/CU MM
TOTAL PROTEIN: 7 GM/DL (ref 6.4–8.2)
WBC # BLD: 5.9 K/CU MM (ref 4–10.5)

## 2023-01-29 PROCEDURE — 1200000000 HC SEMI PRIVATE

## 2023-01-29 PROCEDURE — 2700000000 HC OXYGEN THERAPY PER DAY

## 2023-01-29 PROCEDURE — 94761 N-INVAS EAR/PLS OXIMETRY MLT: CPT

## 2023-01-29 PROCEDURE — 82962 GLUCOSE BLOOD TEST: CPT

## 2023-01-29 PROCEDURE — 6370000000 HC RX 637 (ALT 250 FOR IP): Performed by: PHYSICIAN ASSISTANT

## 2023-01-29 PROCEDURE — 0202U NFCT DS 22 TRGT SARS-COV-2: CPT

## 2023-01-29 PROCEDURE — 82805 BLOOD GASES W/O2 SATURATION: CPT

## 2023-01-29 PROCEDURE — 80053 COMPREHEN METABOLIC PANEL: CPT

## 2023-01-29 PROCEDURE — 6360000002 HC RX W HCPCS: Performed by: PHYSICIAN ASSISTANT

## 2023-01-29 PROCEDURE — 2580000003 HC RX 258: Performed by: PHYSICIAN ASSISTANT

## 2023-01-29 PROCEDURE — 85025 COMPLETE CBC W/AUTO DIFF WBC: CPT

## 2023-01-29 PROCEDURE — 6370000000 HC RX 637 (ALT 250 FOR IP): Performed by: STUDENT IN AN ORGANIZED HEALTH CARE EDUCATION/TRAINING PROGRAM

## 2023-01-29 PROCEDURE — 76937 US GUIDE VASCULAR ACCESS: CPT

## 2023-01-29 RX ORDER — IPRATROPIUM BROMIDE AND ALBUTEROL SULFATE 2.5; .5 MG/3ML; MG/3ML
1 SOLUTION RESPIRATORY (INHALATION) EVERY 4 HOURS PRN
Status: DISCONTINUED | OUTPATIENT
Start: 2023-01-29 | End: 2023-02-03 | Stop reason: HOSPADM

## 2023-01-29 RX ORDER — ZIPRASIDONE MESYLATE 20 MG/ML
10 INJECTION, POWDER, LYOPHILIZED, FOR SOLUTION INTRAMUSCULAR EVERY 6 HOURS PRN
Status: DISCONTINUED | OUTPATIENT
Start: 2023-01-29 | End: 2023-02-03 | Stop reason: HOSPADM

## 2023-01-29 RX ORDER — IPRATROPIUM BROMIDE AND ALBUTEROL SULFATE 2.5; .5 MG/3ML; MG/3ML
1 SOLUTION RESPIRATORY (INHALATION)
Status: DISCONTINUED | OUTPATIENT
Start: 2023-01-29 | End: 2023-01-29

## 2023-01-29 RX ADMIN — PANTOPRAZOLE SODIUM 40 MG: 40 TABLET, DELAYED RELEASE ORAL at 06:22

## 2023-01-29 RX ADMIN — RIFAXIMIN 550 MG: 550 TABLET ORAL at 21:50

## 2023-01-29 RX ADMIN — NADOLOL 40 MG: 20 TABLET ORAL at 21:49

## 2023-01-29 RX ADMIN — LACTULOSE 20 G: 10 SOLUTION ORAL at 13:37

## 2023-01-29 RX ADMIN — LEVOTHYROXINE SODIUM 50 MCG: 0.05 TABLET ORAL at 06:22

## 2023-01-29 RX ADMIN — Medication 10 ML: at 09:30

## 2023-01-29 RX ADMIN — LACTULOSE 20 G: 10 SOLUTION ORAL at 21:55

## 2023-01-29 RX ADMIN — Medication 10 ML: at 21:55

## 2023-01-29 RX ADMIN — SPIRONOLACTONE 100 MG: 50 TABLET ORAL at 17:25

## 2023-01-29 RX ADMIN — ENOXAPARIN SODIUM 40 MG: 100 INJECTION SUBCUTANEOUS at 21:50

## 2023-01-29 NOTE — CONSULTS
Consult completed. Procedure/rationale explained to pt & consent obtained. #20ga PIV initiated using UltraSound-guided technique without difficulty/complications. Pt tolerated well and no other c/o or needs noted or reported. RN notified.

## 2023-01-29 NOTE — RT PROTOCOL NOTE
RT Inhaler-Nebulizer Bronchodilator Protocol Note    There is a bronchodilator order in the chart from a provider indicating to follow the RT Bronchodilator Protocol and there is an Initiate RT Inhaler-Nebulizer Bronchodilator Protocol order as well (see protocol at bottom of note). CXR Findings:  XR CHEST PORTABLE    Result Date: 1/28/2023  Increasing opacity throughout the right hemithorax, which may reflect increasing layering effusion and/or additional airspace disease. Masslike opacity in the right upper lung again demonstrated. The findings from the last RT Protocol Assessment were as follows:   History Pulmonary Disease: Chronic pulmonary disease  Respiratory Pattern: Regular pattern and RR 12-20 bpm  Breath Sounds: Clear breath sounds  Cough: Strong, productive  Indication for Bronchodilator Therapy: On home bronchodilators (PRN at home)  Bronchodilator Assessment Score: 3    Aerosolized bronchodilator medication orders have been revised according to the RT Inhaler-Nebulizer Bronchodilator Protocol below. Respiratory Therapist to perform RT Therapy Protocol Assessment initially then follow the protocol. Repeat RT Therapy Protocol Assessment PRN for score 0-3 or on second treatment, BID, and PRN for scores above 3. No Indications - adjust the frequency to every 6 hours PRN wheezing or bronchospasm, if no treatments needed after 48 hours then discontinue using Per Protocol order mode. If indication present, adjust the RT bronchodilator orders based on the Bronchodilator Assessment Score as indicated below. Use Inhaler orders unless patient has one or more of the following: on home nebulizer, not able to hold breath for 10 seconds, is not alert and oriented, cannot activate and use MDI correctly, or respiratory rate 25 breaths per minute or more, then use the equivalent nebulizer order(s) with same Frequency and PRN reasons based on the score.   If a patient is on this medication at home then do not decrease Frequency below that used at home. 0-3 - enter or revise RT bronchodilator order(s) to equivalent RT Bronchodilator order with Frequency of every 4 hours PRN for wheezing or increased work of breathing using Per Protocol order mode. 4-6 - enter or revise RT Bronchodilator order(s) to two equivalent RT bronchodilator orders with one order with BID Frequency and one order with Frequency of every 4 hours PRN wheezing or increased work of breathing using Per Protocol order mode. 7-10 - enter or revise RT Bronchodilator order(s) to two equivalent RT bronchodilator orders with one order with TID Frequency and one order with Frequency of every 4 hours PRN wheezing or increased work of breathing using Per Protocol order mode. 11-13 - enter or revise RT Bronchodilator order(s) to one equivalent RT bronchodilator order with QID Frequency and an Albuterol order with Frequency of every 4 hours PRN wheezing or increased work of breathing using Per Protocol order mode. Greater than 13 - enter or revise RT Bronchodilator order(s) to one equivalent RT bronchodilator order with every 4 hours Frequency and an Albuterol order with Frequency of every 2 hours PRN wheezing or increased work of breathing using Per Protocol order mode. RT to enter RT Home Evaluation for COPD & MDI Assessment order using Per Protocol order mode.     Electronically signed by Geraldo Mendosa RCP on 1/29/2023 at 2:52 PM

## 2023-01-29 NOTE — PROGRESS NOTES
V2.0  [facility] [place] Hospital[place] Progress Note      Name:  Gladys Toro /Age/Sex: 1945  (78 y.o. male)   MRN & CSN:  [id number] & [id number] Encounter Date/Time: 2023 11:31 AM EST    Location:  1697/0853-B PCP: John Valle, [address] Day: 2    Assessment and Plan:   Gladys Toro is a 78 y.o. male who presents with encephalopathy      Plan:  Acute metabolic encephalopathy-likely hepatic. On lactulose. CT head: No acute process. NH3 119. On lactulose and rifaximin. Nurse states patient has been awake today. If unable to take oral lactulose and will need NG or rectal lactulose to be given. UDS negative. Decompensated cirrhosis with recurrent ascites-complicated by esophageal varices. Deemed not a candidate for TIPS or transplant biopsy around 8 months ago. Supposed to go back for reevaluation. Last paracentesis . Consult IR for paracentesis. GI consult. On beta-blocker, Aldactone and Lasix. Recent history of gram-positive bacteremia-completed doxycycline. Repeat blood cultures drawn in ED. CRP 27. Procalcitonin 0.124. Recurrent right hydrothorax-last thoracentesis . Check CT chest.  Consult pulmonology. CT chest: Large right pleural effusion. Elevated troponin-EKG shows no ST changes, however quality is poor. Recheck EKG no complaints of chest pain. Appears to be chronically elevated. Troponin 0.014. Chronic hypoxic respiratory failure-on 2 L nasal cannula at home  3 masses in right lung-seen on CT chest.  Pulmonology consult. May be atelectasis. HTN  CKD 3  Hypothyroidism  History of alcohol abuse-no longer drinking    Diet ADULT DIET; Regular;  Low Sodium (2 gm)    DVT Prophylaxis [x] Lovenox, []  Heparin, [] SCDs, [] Ambulation,  [] Eliquis, [] Xarelto  [] Coumadin   Code Status Full Code   Disposition From: Home  Expected Disposition: TBD  Estimated Date of Discharge: 2/3  Patient requires continued admission due to hepatic encephalopathy   Home O2 NC as per nurse     Subjective/Interval history:   Chief Complaint: Altered Mental Status     As per nurse patient was agitated earlier because he wanted to use the restroom, once he used it he was relaxed. Currently sleepy. Review of Systems:    Negative unless mentioned above    Objective:   No intake or output data in the 24 hours ending 01/29/23 1131     Vitals:   /80   Pulse 67   Temp 98.2 °F (36.8 °C) (Oral)   Resp 19   Ht 5' 8\" (1.727 m)   Wt 216 lb 4.8 oz (98.1 kg)   SpO2 96%   BMI 32.89 kg/m²     Physical Exam:   General: NAD, sleeping in chair  Eyes: no discharge  HENT: NCAT  Cardiovascular: RRR  Respiratory: decrease breath sounds on right side, on NC  Gastrointestinal: Soft, non tender, some distension in abdomen  Genitourinary: no suprapubic tenderness  Musculoskeletal: edema in LE, nontender  Skin: warm, dry, no gross lesions  Neuro: briefly wakes up and goes to sleep.  Difficult to assess  Psych: asleep mostly    Medications:   Medications:    lactulose  20 g Oral Once    DULoxetine  60 mg Oral Daily    pantoprazole  40 mg Oral QAM AC    furosemide  40 mg Oral Daily    levothyroxine  50 mcg Oral Daily    nadolol  40 mg Oral Nightly    rifAXIMin  550 mg Oral BID    spironolactone  100 mg Oral BID    thiamine  100 mg Oral Daily    sodium chloride flush  5-40 mL IntraVENous 2 times per day    enoxaparin  40 mg SubCUTAneous Nightly    lactulose  20 g Oral TID      Infusions:    sodium chloride       PRN Meds: ziprasidone, 10 mg, Q6H PRN  albuterol sulfate HFA, 1 puff, Q6H PRN  sodium chloride flush, 5-40 mL, PRN  sodium chloride, 500 mL, PRN  ondansetron, 4 mg, Q8H PRN   Or  ondansetron, 4 mg, Q6H PRN  polyethylene glycol, 17 g, Daily PRN  acetaminophen, 650 mg, Q6H PRN   Or  acetaminophen, 650 mg, Q6H PRN  labetalol, 10 mg, Q6H PRN        Labs      Recent Labs     01/28/23  1416   WBC 8.1   HGB 12.4*   HCT 38.0*         Recent Labs     01/28/23  1416      K 3.2*    CO2 24   BUN 23   CREATININE 1.2     Recent Labs     01/28/23  1416   AST 29   ALT 17   BILITOT 0.4   ALKPHOS 80     Recent Labs     01/28/23  1416   INR 1.01     Recent Labs     01/28/23  1416   TROPONINT 0.014*     Lab Results   Component Value Date/Time    LABA1C 5.5 12/06/2022 04:17 PM     CALCIUM:  7.1/24 (01/28 1416)  Lab Results   Component Value Date/Time    MG 2.1 01/28/2023 02:16 PM           Electronically signed by Colette Rice MD on 1/29/2023 at 11:31 AM

## 2023-01-29 NOTE — PROGRESS NOTES
Patient sleeping comfortably - will not do a set of vitals until he is awake d/t having difficulty throughout the night with climbing out of bed - tele monitor on at this time.  Informed PCA to allow him to sleep

## 2023-01-29 NOTE — PROGRESS NOTES
Patient was lying there and noted that he was bleeding - not sure from where and seen that he had pulled off the iv hub and it was leaking everywhere - changed his gown and rehooked up the IV

## 2023-01-30 ENCOUNTER — APPOINTMENT (OUTPATIENT)
Dept: INTERVENTIONAL RADIOLOGY/VASCULAR | Age: 78
End: 2023-01-30
Payer: MEDICARE

## 2023-01-30 ENCOUNTER — APPOINTMENT (OUTPATIENT)
Dept: GENERAL RADIOLOGY | Age: 78
End: 2023-01-30
Payer: MEDICARE

## 2023-01-30 PROBLEM — G93.41 ACUTE METABOLIC ENCEPHALOPATHY: Status: ACTIVE | Noted: 2022-03-19

## 2023-01-30 LAB
ALBUMIN SERPL-MCNC: 3 GM/DL (ref 3.4–5)
ALP BLD-CCNC: 94 IU/L (ref 40–129)
ALT SERPL-CCNC: 22 U/L (ref 10–40)
AMYLASE FLUID: 33 U/L
ANION GAP SERPL CALCULATED.3IONS-SCNC: 7 MMOL/L (ref 4–16)
AST SERPL-CCNC: 36 IU/L (ref 15–37)
BASOPHILS ABSOLUTE: 0.1 K/CU MM
BASOPHILS RELATIVE PERCENT: 1.3 % (ref 0–1)
BILIRUB SERPL-MCNC: 0.6 MG/DL (ref 0–1)
BILIRUBIN URINE: NEGATIVE MG/DL
BLOOD, URINE: NEGATIVE
BUN BLDV-MCNC: 28 MG/DL (ref 6–23)
CALCIUM SERPL-MCNC: 9.4 MG/DL (ref 8.3–10.6)
CHLORIDE BLD-SCNC: 103 MMOL/L (ref 99–110)
CLARITY: CLEAR
CO2: 30 MMOL/L (ref 21–32)
COLOR: YELLOW
COMMENT UA: ABNORMAL
CREAT SERPL-MCNC: 1.7 MG/DL (ref 0.9–1.3)
DIFFERENTIAL TYPE: ABNORMAL
DOSE AMOUNT: NORMAL
DOSE TIME: NORMAL
EOSINOPHILS ABSOLUTE: 0.9 K/CU MM
EOSINOPHILS RELATIVE PERCENT: 13.2 % (ref 0–3)
GFR SERPL CREATININE-BSD FRML MDRD: 42 ML/MIN/1.73M2
GLUCOSE BLD-MCNC: 102 MG/DL (ref 70–99)
GLUCOSE BLD-MCNC: 108 MG/DL (ref 70–99)
GLUCOSE BLD-MCNC: 285 MG/DL (ref 70–99)
GLUCOSE, FLUID: 121 MG/DL
GLUCOSE, FLUID: 122 MG/DL
GLUCOSE, URINE: NEGATIVE MG/DL
HCT VFR BLD CALC: 34.4 % (ref 42–52)
HEMOGLOBIN: 11.2 GM/DL (ref 13.5–18)
IMMATURE NEUTROPHIL %: 0.3 % (ref 0–0.43)
KETONES, URINE: 15 MG/DL
LACTATE DEHYDROGENASE, FLUID: 151 IU/L
LEUKOCYTE ESTERASE, URINE: NEGATIVE
LYMPHOCYTES ABSOLUTE: 0.8 K/CU MM
LYMPHOCYTES RELATIVE PERCENT: 12 % (ref 24–44)
MAGNESIUM: 2.8 MG/DL (ref 1.8–2.4)
MCH RBC QN AUTO: 31.7 PG (ref 27–31)
MCHC RBC AUTO-ENTMCNC: 32.6 % (ref 32–36)
MCV RBC AUTO: 97.5 FL (ref 78–100)
MONOCYTES ABSOLUTE: 0.9 K/CU MM
MONOCYTES RELATIVE PERCENT: 12.9 % (ref 0–4)
NITRITE URINE, QUANTITATIVE: NEGATIVE
NUCLEATED RBC %: 0 %
PDW BLD-RTO: 13.9 % (ref 11.7–14.9)
PH FLUID: 8
PH, URINE: 6 (ref 5–8)
PHOSPHORUS: 3.2 MG/DL (ref 2.5–4.9)
PLATELET # BLD: 191 K/CU MM (ref 140–440)
PMV BLD AUTO: 9.9 FL (ref 7.5–11.1)
POTASSIUM SERPL-SCNC: 4.7 MMOL/L (ref 3.5–5.1)
PRO-BNP: 412 PG/ML
PROCALCITONIN: 0.11
PROTEIN UA: NEGATIVE MG/DL
RBC # BLD: 3.53 M/CU MM (ref 4.6–6.2)
SEGMENTED NEUTROPHILS ABSOLUTE COUNT: 4.1 K/CU MM
SEGMENTED NEUTROPHILS RELATIVE PERCENT: 60.3 % (ref 36–66)
SODIUM BLD-SCNC: 140 MMOL/L (ref 135–145)
SPECIFIC GRAVITY UA: 1.02 (ref 1–1.03)
TOTAL IMMATURE NEUTOROPHIL: 0.02 K/CU MM
TOTAL NUCLEATED RBC: 0 K/CU MM
TOTAL PROTEIN: 6.4 GM/DL (ref 6.4–8.2)
TROPONIN T: 0.07 NG/ML
UROBILINOGEN, URINE: 0.2 MG/DL (ref 0.2–1)
VANCOMYCIN RANDOM: <4 UG/ML
WBC # BLD: 6.8 K/CU MM (ref 4–10.5)

## 2023-01-30 PROCEDURE — 2700000000 HC OXYGEN THERAPY PER DAY

## 2023-01-30 PROCEDURE — 87205 SMEAR GRAM STAIN: CPT

## 2023-01-30 PROCEDURE — 84145 PROCALCITONIN (PCT): CPT

## 2023-01-30 PROCEDURE — 6370000000 HC RX 637 (ALT 250 FOR IP): Performed by: PHYSICIAN ASSISTANT

## 2023-01-30 PROCEDURE — P9047 ALBUMIN (HUMAN), 25%, 50ML: HCPCS

## 2023-01-30 PROCEDURE — 6370000000 HC RX 637 (ALT 250 FOR IP): Performed by: STUDENT IN AN ORGANIZED HEALTH CARE EDUCATION/TRAINING PROGRAM

## 2023-01-30 PROCEDURE — 82962 GLUCOSE BLOOD TEST: CPT

## 2023-01-30 PROCEDURE — 80202 ASSAY OF VANCOMYCIN: CPT

## 2023-01-30 PROCEDURE — 71045 X-RAY EXAM CHEST 1 VIEW: CPT

## 2023-01-30 PROCEDURE — 80053 COMPREHEN METABOLIC PANEL: CPT

## 2023-01-30 PROCEDURE — 32555 ASPIRATE PLEURA W/ IMAGING: CPT | Performed by: INTERNAL MEDICINE

## 2023-01-30 PROCEDURE — 99223 1ST HOSP IP/OBS HIGH 75: CPT | Performed by: INTERNAL MEDICINE

## 2023-01-30 PROCEDURE — 6360000002 HC RX W HCPCS: Performed by: PHYSICIAN ASSISTANT

## 2023-01-30 PROCEDURE — 2580000003 HC RX 258

## 2023-01-30 PROCEDURE — 6360000002 HC RX W HCPCS: Performed by: HOSPITALIST

## 2023-01-30 PROCEDURE — 94761 N-INVAS EAR/PLS OXIMETRY MLT: CPT

## 2023-01-30 PROCEDURE — 51798 US URINE CAPACITY MEASURE: CPT

## 2023-01-30 PROCEDURE — 2580000003 HC RX 258: Performed by: PHYSICIAN ASSISTANT

## 2023-01-30 PROCEDURE — 87070 CULTURE OTHR SPECIMN AEROBIC: CPT

## 2023-01-30 PROCEDURE — 87102 FUNGUS ISOLATION CULTURE: CPT

## 2023-01-30 PROCEDURE — 0W993ZZ DRAINAGE OF RIGHT PLEURAL CAVITY, PERCUTANEOUS APPROACH: ICD-10-PCS | Performed by: INTERNAL MEDICINE

## 2023-01-30 PROCEDURE — 36415 COLL VENOUS BLD VENIPUNCTURE: CPT

## 2023-01-30 PROCEDURE — 81003 URINALYSIS AUTO W/O SCOPE: CPT

## 2023-01-30 PROCEDURE — 99233 SBSQ HOSP IP/OBS HIGH 50: CPT | Performed by: INTERNAL MEDICINE

## 2023-01-30 PROCEDURE — 6360000002 HC RX W HCPCS

## 2023-01-30 PROCEDURE — APPSS60 APP SPLIT SHARED TIME 46-60 MINUTES: Performed by: NURSE PRACTITIONER

## 2023-01-30 PROCEDURE — 88108 CYTOPATH CONCENTRATE TECH: CPT

## 2023-01-30 PROCEDURE — 84100 ASSAY OF PHOSPHORUS: CPT

## 2023-01-30 PROCEDURE — 49083 ABD PARACENTESIS W/IMAGING: CPT

## 2023-01-30 PROCEDURE — 84484 ASSAY OF TROPONIN QUANT: CPT

## 2023-01-30 PROCEDURE — 93005 ELECTROCARDIOGRAM TRACING: CPT | Performed by: HOSPITALIST

## 2023-01-30 PROCEDURE — 32555 ASPIRATE PLEURA W/ IMAGING: CPT

## 2023-01-30 PROCEDURE — 1200000000 HC SEMI PRIVATE

## 2023-01-30 PROCEDURE — 2709999900 IR GUIDED THORACENTESIS PLEURAL

## 2023-01-30 PROCEDURE — 6360000002 HC RX W HCPCS: Performed by: RADIOLOGY

## 2023-01-30 PROCEDURE — P9047 ALBUMIN (HUMAN), 25%, 50ML: HCPCS | Performed by: RADIOLOGY

## 2023-01-30 PROCEDURE — 0W9G30Z DRAINAGE OF PERITONEAL CAVITY WITH DRAINAGE DEVICE, PERCUTANEOUS APPROACH: ICD-10-PCS | Performed by: RADIOLOGY

## 2023-01-30 PROCEDURE — 83880 ASSAY OF NATRIURETIC PEPTIDE: CPT

## 2023-01-30 PROCEDURE — 83735 ASSAY OF MAGNESIUM: CPT

## 2023-01-30 PROCEDURE — 82945 GLUCOSE OTHER FLUID: CPT

## 2023-01-30 PROCEDURE — 83986 ASSAY PH BODY FLUID NOS: CPT

## 2023-01-30 PROCEDURE — 2580000003 HC RX 258: Performed by: HOSPITALIST

## 2023-01-30 PROCEDURE — 88305 TISSUE EXAM BY PATHOLOGIST: CPT

## 2023-01-30 PROCEDURE — 85025 COMPLETE CBC W/AUTO DIFF WBC: CPT

## 2023-01-30 PROCEDURE — 83615 LACTATE (LD) (LDH) ENZYME: CPT

## 2023-01-30 PROCEDURE — 87040 BLOOD CULTURE FOR BACTERIA: CPT

## 2023-01-30 PROCEDURE — 87077 CULTURE AEROBIC IDENTIFY: CPT

## 2023-01-30 PROCEDURE — 2709999900 IR US GUIDED PARACENTESIS

## 2023-01-30 PROCEDURE — 82150 ASSAY OF AMYLASE: CPT

## 2023-01-30 RX ORDER — SODIUM CHLORIDE 0.9 % (FLUSH) 0.9 %
10 SYRINGE (ML) INJECTION PRN
Status: DISCONTINUED | OUTPATIENT
Start: 2023-01-30 | End: 2023-02-03 | Stop reason: HOSPADM

## 2023-01-30 RX ORDER — SODIUM CHLORIDE 0.9 % (FLUSH) 0.9 %
10 SYRINGE (ML) INJECTION PRN
Status: CANCELLED | OUTPATIENT
Start: 2023-01-30

## 2023-01-30 RX ORDER — ALBUMIN (HUMAN) 12.5 G/50ML
SOLUTION INTRAVENOUS CONTINUOUS PRN
Status: COMPLETED | OUTPATIENT
Start: 2023-01-30 | End: 2023-01-30

## 2023-01-30 RX ADMIN — RIFAXIMIN 550 MG: 550 TABLET ORAL at 09:26

## 2023-01-30 RX ADMIN — Medication 100 MG: at 09:26

## 2023-01-30 RX ADMIN — NADOLOL 40 MG: 20 TABLET ORAL at 20:19

## 2023-01-30 RX ADMIN — Medication 10 ML: at 09:28

## 2023-01-30 RX ADMIN — Medication 10 ML: at 20:18

## 2023-01-30 RX ADMIN — VANCOMYCIN HYDROCHLORIDE 2000 MG: 10 INJECTION, POWDER, LYOPHILIZED, FOR SOLUTION INTRAVENOUS at 11:00

## 2023-01-30 RX ADMIN — LEVOTHYROXINE SODIUM 50 MCG: 0.05 TABLET ORAL at 05:31

## 2023-01-30 RX ADMIN — LACTULOSE 20 G: 10 SOLUTION ORAL at 20:17

## 2023-01-30 RX ADMIN — LACTULOSE 20 G: 10 SOLUTION ORAL at 09:26

## 2023-01-30 RX ADMIN — SODIUM CHLORIDE 500 ML: 9 INJECTION, SOLUTION INTRAVENOUS at 10:58

## 2023-01-30 RX ADMIN — DULOXETINE HYDROCHLORIDE 60 MG: 30 CAPSULE, DELAYED RELEASE ORAL at 09:25

## 2023-01-30 RX ADMIN — PANTOPRAZOLE SODIUM 40 MG: 40 TABLET, DELAYED RELEASE ORAL at 05:31

## 2023-01-30 RX ADMIN — ENOXAPARIN SODIUM 40 MG: 100 INJECTION SUBCUTANEOUS at 20:18

## 2023-01-30 RX ADMIN — LACTULOSE 20 G: 10 SOLUTION ORAL at 15:09

## 2023-01-30 RX ADMIN — RIFAXIMIN 550 MG: 550 TABLET ORAL at 20:17

## 2023-01-30 RX ADMIN — ALBUMIN (HUMAN) 37.5 G: 0.25 INJECTION, SOLUTION INTRAVENOUS at 09:04

## 2023-01-30 NOTE — CONSULTS
Infectious Disease Consult Note  2023   Patient Name: Gladys Toro : 1945   Impression  Staph Spp 4/4 in Blood Cultures:  Large Right Pleural Effusion:  Afebrile, no leukocytosis  CrCl 42  Pct 0.116, 0.111, CRP 27.6  -CT Chest W Contrast: 1. 3 separate rounded masslike densities identified within the right upper   and middle lobes and right lower lobe which are favored to reflect foci of   rounded atelectasis. Recommend interval follow-up to ensure resolution and   exclude the possibility of underlying pulmonary mass/pulmonary nodule. 2. Large right pleural effusion redemonstrated. 3. Redemonstration of cirrhotic morphology of the liver with large volume   intra-abdominal ascites. 4. Nonspecific mediastinal lymphadenopathy. Last EVA 2023: No evidence of IE  Dr. Haydee Espitia, pulmonology, onboard  CANDIE:  Baseline Cr 1.3  De-compensated Alcoholic Cirrhosis with Ascites with Acute Hepatic Encephalopathy:  Dr. Duane Farber consulted  Ammonia -119  JENNIFER:  HTN:  HFpEF:  Hearing Impaired:  Pulmonary Nodules:  Obesity:  BMI:  32.66  Multi-Morbidity: per PMHx:Anxiety, alcoholic cirrhosis, GERD, HLD, HTN, portal HTN, pulmonary nodules, JENNIFER, choley    Plan:  Continue IV vancomycin per pharmacy dosing  Trend CRP and Pct, ordered  Repeat BC until negative at 48 hours  Await repeat BC from 1/30  May order a limited echo after spp of bacteremia returns  Await IR thora/paracenteses with cultures and fluid analysis    Thank you for allowing me to consult in the care of this patient.  ------------------------  REASON FOR CONSULT: Infective syndrome \"bacteremia, hx bacteremia in past\"  Requested by: Dr. Karey Slaughter is a 78 y.o.  male with a history of alcoholic liver cirrhosis with portal HTN, HTN, hypothyroidism, and morbid obesity who was admitted 2023 for further evaluation and management of confusion.   As reported by the patient's wife, the patient was at his baseline, A&O, the day prior to admission. She reports he had been having very frequent bowel movements and he decreased his lactulose dosing at home due to that. He did have a follow up at [facility] per recommendation of Dr. Anjali Guerrero, about 8 months ago, and the patient was found to not be a candidate for TIPD and/or liver transplant candidate. He was hospitalized from 1/4-1/11/2023 with AMS and at that time his [facility] SYSTEM grew 1/4 Staph epidermidis and he was treated with IV vancomycin for 5 days, underwent a EVA which was negative for IE. He also was [facility] home on po doxycycline 100 mg bid x 3 days. His BC on 1/28 are showing 4/4 positive for Staph spp and he has been started on IV vancomycin. He underwent a CT Chest, A&P W Contrast which revealed 3 separate rounded mass-like densities identified within the right upper and middle lobes and RLL which are favored to reflect foci of rounded atelectasis. A large right pleural effusion. Re-demonstration of cirrhotic morphology of the liver with large volume intra-abdominal ascites. ?  Infectious diseases service was consulted to evaluate the pt, and recommend further investigative and therapeutic measures. ROS: Other systems reviewed Including eyes, ENT, respiratory, cardiovascular, GI, , dermatologic, neurologic, psych, hem/lymphatic, musculoskeletal and endocrine were negative other than what is mentioned above.      Patient Active Problem List    Diagnosis Date Noted    Stage 3 chronic kidney disease ([address] 01/09/2023    Bacteremia due to methicillin resistant Staphylococcus epidermidis 01/09/2023    Sleep related hypoxia 12/06/2022    AMS (altered mental status) 11/12/2022    Hypoxia 11/07/2022    Pleural effusion 10/04/2022    Recurrent pleural effusion on right 09/09/2022    Ex-smoker 08/15/2022    Obesity (BMI 30-39.9) 08/15/2022    Type 2 diabetes mellitus with chronic kidney disease 07/12/2022    Thyroid disease 06/21/2022    Cellulitis of left lower extremity 06/08/2022    Type 2 diabetes mellitus without complication, without long-term current use of insulin ([address] 06/08/2022    Generalized weakness     Oropharyngeal dysphagia     Acute kidney injury superimposed on chronic kidney disease ([address]     Acute respiratory failure ([address] 05/04/2022    Recurrent right pleural effusion 05/02/2022    Septicemia ([address] 03/19/2022    Hepatic encephalopathy 03/19/2022    Hearing loss 03/11/2022    Decompensation of cirrhosis of liver ([address]     Secondary esophageal varices without bleeding (HCC)     Gastric polyps     SOB (shortness of breath) 09/13/2021    Portal hypertension ([address] 08/30/2021    Bilateral hearing loss 05/07/2021    Increased ammonia level 05/07/2021    Class 3 severe obesity with body mass index (BMI) of 40.0 to 44.9 in adult [facility]) 09/21/2020    Hyperglycemia 12/04/2019    Acquired hypothyroidism 12/04/2019    Pulmonary nodules 09/09/2019    Ascites due to alcoholic cirrhosis ([address] 31/43/5924    Mixed hyperlipidemia 08/08/2019    Hypertension 08/08/2019    History of alcohol abuse 08/08/2019    Gastroesophageal reflux disease 08/08/2019    Anxiety 08/08/2019    Shortness of breath 05/26/2019    History of colonic polyps 01/15/2019     Past Medical History:   Diagnosis Date    Anxiety     Cirrhosis, alcoholic ([address]     Diabetes mellitus ([address]     GERD (gastroesophageal reflux disease) 2000    GERD (gastroesophageal reflux disease)     Hyperlipidemia     Hypertension     Portal hypertension ([address]     Pulmonary nodules     Sleep apnea     SOB (shortness of breath)     Thyroid disease       Past Surgical History:   Procedure Laterality Date    APPENDECTOMY      CHOLECYSTECTOMY      COLONOSCOPY      ENDOSCOPY, COLON, DIAGNOSTIC      FOOT SURGERY      needle removed,not sure which foot, per wife.     TONSILLECTOMY      UPPER GASTROINTESTINAL ENDOSCOPY N/A 1/19/2022    EGD BIOPSY performed by Nemo Tobin MD at [address] [place] [address]        Family History   Problem Relation Age of Onset Hypertension Brother     Diabetes Other       Infectious disease related family history - not contibutory. SOCIAL HISTORY  Social History     Tobacco Use    Smoking status: Former     Packs/day: 1.00     Years: 14.00     Pack years: 14.00     Types: Cigarettes     Quit date:      Years since quittin.1    Smokeless tobacco: Never   Substance Use Topics    Alcohol use: Not Currently      Born: [address]  Lives: [address] wife  Occupation: Retired   No recent travel of significance. No recent unusual exposures. Pets: one dog, one cat  ? ALLERGIES  Allergies   Allergen Reactions    Lisinopril Swelling     Tongue swelling      Zetia [Ezetimibe] Swelling     Facial swelling    Atorvastatin     Codeine Other (See Comments)     Blood pressure drops    Hydrochlorothiazide     Hydroxyzine      Fatigue and depressed    Lexapro [Escitalopram Oxalate]      Increased depression    Pravastatin       MEDICATIONS  Reviewed and are per the chart/EMR. ? Antibiotics:   Present:  Vancomycin -  Past:  ?  -------------------------------------------------------------------------------------------------------------------    Vital Signs:  Vitals:    23 0904   BP:    Pulse: 67   Resp: 17   Temp:    SpO2: 100%         Exam:    VS: noted; wt 213 lb (96.6 kg)  Height 5'8\"  Gen: alert and oriented X3, no distress  Skin: no stigmata of endocarditis  HEMT: AT/NC Oropharynx pink, moist, and without lesions or exudates; dentition in good state of repair  Eyes: PERRLA, EOMI, conjunctiva pink, sclera anicteric. Neck: Supple. Trachea midline. No LAD. Chest: no distress and CTA. Good air movement. Heart: RRR and no MRG. Abd: soft, non-distended, no tenderness, no hepatomegaly. Normoactive bowel sounds. Ext: no clubbing, cyanosis, or edema  Neuro: Mental status intact. CN 2-12 intact and no focal sensory or motor deficits    ? Diagnostic Studies: reviewed   CT Head WO Contrast:  Impression   No acute intracranial abnormality. 1/28/2023 XR Chest Portable:  Impression   Increasing opacity throughout the right hemithorax, which may reflect   increasing layering effusion and/or additional airspace disease. Masslike opacity in the right upper lung again demonstrated. 1/28/2023 CT Chest W Contrast:??  Impression   1. 3 separate rounded masslike densities identified within the right upper   and middle lobes and right lower lobe which are favored to reflect foci of   rounded atelectasis. Recommend interval follow-up to ensure resolution and   exclude the possibility of underlying pulmonary mass/pulmonary nodule. 2. Large right pleural effusion redemonstrated. 3. Redemonstration of cirrhotic morphology of the liver with large volume   intra-abdominal ascites. 4. Nonspecific mediastinal lymphadenopathy. 1/30/2023 IR US Guided Thoracentesis:    1/30/2023 IR US Guided Paracentesis:    I have examined this patient and available medical records on this date and have made the above observations, conclusions and recommendations. Electronically signed by: Electronically signed by MOHINDER Quintana CNP on 1/30/2023 at 12:19 PM

## 2023-01-30 NOTE — OR NURSING
PROCEDURE PERFORMED: paracentesis    PRIMARY INDICATION FOR PROCEDURE: recurrent ascites    INFORMED CONSENT:  Obtained prior to procedure. Consent placed in chart. PT TRANSPORTED FROM:  3026                                  TO THE IR ROOM:   Large                     ASSESSMENT: Pt alert & oriented on 2LNC. Baseline [place]. BARRIER PRECAUTIONS & STERILE TECHNIQUE:               Pt positioned supine for comfort. Warm blankets given. Pt placed on Cardiac Monitor. Pt prepped and draped in a sterile fashion with chlorhexadine. PAIN/LOCAL ANESTHESIA/SEDATION MANAGEMENT:           Local: Lidocaine 1% given by Dr. Mcgowan Degree:           ACCESS TIME: 4415          US/FLUORO: US 2 images          ACCESS USED: OneStep        STERILE DRESSINGS: guaze & tegaderm    SPECIMENS: 5400cc milky ascitic fluid removed. EBL:  <1cc        FOLLOW- UP X-RAY: None    COMPLICATIONS/ OUTCOME: VSS.  37.5g of 25% albumin administered IV     REPORT CALLED TO: Attempted multiple floor phones including charge w/ no answer

## 2023-01-30 NOTE — CARE COORDINATION
Case Management Assessment  Initial Evaluation    Date/Time of Evaluation: 1/30/2023 3:36 PM  Assessment Completed by: ANTHONY Dominguez    If patient is discharged prior to next notation, then this note serves as note for discharge by case management. Patient Name: Yu Espino                   YOB: 1945  Diagnosis: Hepatic encephalopathy [K76.82]  Hyperammonemia ([address] [X00.50]  Acute metabolic encephalopathy [A19.95]                   Date / Time: 1/28/2023  2:03 PM    Patient Admission Status: Inpatient   Readmission Risk (Low < 19, Mod (19-27), High > 27): Readmission Risk Score: 36    Current PCP: Madeline David MD  PCP verified by CM? Yes    Chart Reviewed: Yes      History Provided by: Patient  Patient Orientation: Alert and Oriented    Patient Cognition: Alert    Hospitalization in the last 30 days (Readmission):  No    If yes, Readmission Assessment in CM Navigator will be completed. Advance Directives:      Code Status: Full Code   Patient's Primary Decision Maker is:      Primary Decision Maker: Eda Rodriguez - Spouse - [phone]    Discharge Planning:    Patient lives with: Spouse/Significant Other Type of Home: House  Primary Care Giver: Spouse  Patient Support Systems include: Spouse/Significant Other, Children   Current Financial resources: Medicare  Current community resources:    Current services prior to admission: Oxygen Therapy            Current DME:              Type of Home Care services:  None    ADLS  Prior functional level: Independent in ADLs/IADLs  Current functional level: Independent in ADLs/IADLs    PT AM-PAC:   /24  OT AM-PAC:   /24    Family can provide assistance at DC: Yes  Would you like Case Management to discuss the discharge plan with any other family members/significant others, and if so, who?  Yes  Plans to Return to Present Housing: Yes  Other Identified Issues/Barriers to RETURNING to current housing: none  Potential Assistance needed at discharge: Home Care            Potential DME:    Patient expects to discharge to: [facility] for transportation at discharge:      Financial    Payor: [address] / Plan: MEDICARE PART A / Product Type: *No Product type* /     Does insurance require precert for SNF: No    Potential assistance Purchasing Medications:    Meds-to-Beds request: Yes      [facility] ([place]), [place] - [facility] [phone] - F [phone]  [facility] ([address]) [place] [address]  Phone: [phone] Fax: [address] [facility] [phone] Marianna Weber [phone]  [address] [facility] [address]  Phone: [phone] Fax: [phone]      Notes:    Factors facilitating achievement of predicted outcomes: Family support, Motivated, Cooperative, and Pleasant    Barriers to discharge: none    Additional Case Management Notes: CM in to see Pt to initiate discharge planning. Plan is home with his wife. Pt denies any needs at this time. CM following     The Plan for Transition of Care is related to the following treatment goals of Hepatic encephalopathy [K76.82]  Hyperammonemia ([address] [H27.35]  Acute metabolic encephalopathy [T56.27]    IF APPLICABLE: The Patient and/or patient representative Alison Mckay and his family were provided with a choice of provider and agrees with the discharge plan. Freedom of choice list with basic dialogue that supports the patient's individualized plan of care/goals and shares the quality data associated with the providers was provided to:     Patient Representative Name:       The Patient and/or Patient Representative Agree with the Discharge Plan?       Dalton Rios, [place]  Case Management Department  Ph: [phone]

## 2023-01-30 NOTE — CONSULTS
[address] [place] [address], [facility]                                  CONSULTATION    PATIENT NAME: Luis Puckett                   :        1945  MED REC NO:   [id number]                          ROOM:       0982  ACCOUNT NO:   [de-identified]                           ADMIT DATE: 2023  PROVIDER:     Francisco Mariano MD    CONSULT DATE:  2023    HISTORY OF PRESENT ILLNESS:  The patient is a 82-year-old gentleman with  multiple medical problems including diabetes, gastroesophageal reflux  disease, alcoholic cirrhosis, hypertension, hyperlipidemia, possible  underlying COPD, obstructive sleep apnea, who was admitted through the  emergency room because of altered mental status. He denies any  significant cough, fever, chills, nausea, or vomiting. According to the  wife, in the emergency room, he was confused and asterixis, his ammonia  was 119. His chest x-ray showed increasing right pleural effusion. His  CAT scan of the chest showed large right pleural effusion, underlying  right lower lobe atelectasis and middle lobe atelectasis, possible  underlying mass, and large abdominal ascites. The patient was  subsequently admitted to the hospital.    PAST MEDICAL HISTORY:  Significant for hypertension, hyperlipidemia,  alcoholic cirrhosis, anxiety disorder, gastroesophageal reflux disease,  pulmonary nodules, obstructive sleep apnea, and possible underlying  COPD. PAST SURGICAL HISTORY:  Remarkable for appendectomy, cholecystectomy,  colonoscopy, tonsillectomy, upper endoscopy, and vasectomy. FAMILY HISTORY:  Noncontributory. SOCIAL HISTORY:  Reveals that he quit smoking in , but used to smoke  a pack per day for 14 years prior to that. He does not drink alcohol  currently, but has history of alcohol abuse in the past.  No history of  drug abuse.     CURRENT MEDICATIONS:  His medications were reviewed. ALLERGIES:  He is allergic to LISINOPRIL, ZETIA, ATORVASTATIN, CODEINE,  HYDROCHLOROTHIAZIDE, HYDROXYZINE, LEXAPRO, and PRAVASTATIN. REVIEW OF SYSTEMS:  10 to 14-point review of systems were reviewed and  are negative except for what is mentioned in history of present illness. PHYSICAL EXAMINATION:  GENERAL:  The patient is comfortable, but somnolent, in no acute  respiratory distress. VITAL SIGNS:  His blood pressure is 121/80 mmHg, pulse of 67 per minute,  and respiratory rate of 19 per minute. He is afebrile. His saturation  is 100% on 2 liters nasal cannula. HEENT:  Exam is essentially unremarkable. There is no JVD, no  lymphadenopathy. NECK:  Supple. LUNGS:  Exam revealed diminished breath sounds right base and mid lung. CARDIOVASCULAR:  Heart exam showed normal S1, S2.  ABDOMEN:  Exam reveals that abdomen is distended. No tenderness. No  masses. Bowel sounds present. NEUROLOGIC:  Exam reveals that he is somnolent. LABORATORY DATA:  His CAT scan of the chest as mentioned in history of  present illness. His chest x-ray shows increasing right pleural  effusion. His CT of the head showed no acute abnormality. His  electrolytes showed sodium 138, potassium 3.2, chloride 106, carbon  dioxide 24, BUN 23, creatinine 1.2. His ammonia was 119. His CBC  showed a white count of 8.1, hemoglobin 12.4, hematocrit 38.0. His  venous blood gases showed a pH of 7.41, pCO2 of 54, pO2 of 62 with 89%  saturation. IMPRESSION:  1. Decompensated cirrhosis with recurrent ascites and pleural effusion,  hepatic hydrothorax. 2.  COPD. 3.  Obstructive sleep apnea. 4.  Hepatic encephalopathy. PLAN:  1. We will start the patient on DuoNeb every 4 hours while awake. 2.  Supplement potassium. Check magnesium and phosphorus. 3.  Wean FIO2 to keep saturation greater than 90%. 4.  Check venous blood gases.   5.  Dr. Jonnathan Morin will see the patient tomorrow and then decide about  performing thoracentesis. 6.  As per orders.         Alondra Walker MD    D: 01/29/2023 12:44:53       T: 01/29/2023 12:49:36     /[email or web address]  Job#: [id number]     Doc#: [id number]    CC:

## 2023-01-30 NOTE — PROGRESS NOTES
[address]  consulted by Dr. Ermias Triana for monitoring and adjustment. Indication for treatment: Vancomycin indication: Bacteremia  Goal trough: Trough Goal: 15-20 mcg/mL  AUC/TRES: 400-600    Risk Factors for MRSA Identified:   Received IV antibiotics within the past 90 days    Pertinent Laboratory Values:   Temp Readings from Last 3 Encounters:   01/30/23 98 °F (36.7 °C) (Oral)   01/24/23 97.8 °F (36.6 °C) (Oral)   01/11/23 98.2 °F (36.8 °C) (Oral)     Recent Labs     01/28/23  1416 01/29/23  1725 01/30/23  0535   WBC 8.1 5.9 6.8   LACTATE 0.9  --   --      Recent Labs     01/28/23  1416 01/29/23  1725 01/30/23  0535   BUN 23 29* 28*   CREATININE 1.2 1.7* 1.7*     Estimated Creatinine Clearance: 42 mL/min (A) (based on SCr of 1.7 mg/dL (H)). Intake/Output Summary (Last 24 hours) at 1/30/2023 0910  Last data filed at 1/30/2023 0640  Gross per 24 hour   Intake --   Output 160 ml   Net -160 ml       Pertinent Cultures:   Date    Source    Results  1/28   Blood    GPC, staph species 4/4 1/29   Respiratory PCR  Negative    Vancomycin level:   TROUGH:  No results for input(s): VANCOTROUGH in the last 72 hours. RANDOM:  No results for input(s): VANCORANDOM in the last 72 hours. Assessment:  HPI: Nora Gonzáles is a 78 y.o. male who presents with encephalopathy. Patient was recently admitted at the beginning of the month and received IV antibiotics for bacteremia. Blood cultures obtained during this admission showing staph species in 4/4 and vancomycin initiated.   SCr, BUN, and urine output:   CANDIE on CKD3, baseline 1.2-1.3  Day(s) of therapy: 1  Vancomycin concentration: to be collected    Plan:  Vancomycin 2000 mg IVPB x1  Intermittent dosing based on levels while in CANDIE  Plan to collect a level tomorrow AM  Patient may be able to tolerate scheduled dosing, pending Bayesian dosing calculation once level is obtained  Pharmacy will continue to monitor patient and adjust therapy as indicated    VANCOMYCIN CONCENTRATION SCHEDULED FOR 1/31 @ 0600    Thank you for the consult,  [facility] [place], PharmD  1/30/2023 9:10 AM

## 2023-01-30 NOTE — PROGRESS NOTES
V2.0  [facility] Hospitalist Progress Note      Name:  Shannan Johnson /Age/Sex: 1945  (78 y.o. male)   MRN & CSN:  [id number] & [id number] Encounter Date/Time: 2023 11:31 AM EST    Location:  0926/3385-X PCP: Katerina Vásquez, [address] Day: 3    Assessment and Plan:   Shannan Johnson is a 78 y.o. male who presents with encephalopathy      Plan:  Acute metabolic encephalopathy  -likely hepatic, and bacteremia. On lactulose. CT head: No acute process. NH3 119. On lactulose and rifaximin. Nurse states patient has been awake today. UDS negative. -Recheck NH3. Mental status appears improved. Decompensated cirrhosis with recurrent ascites  -complicated by esophageal varices. Deemed not a candidate for TIPS or transplant biopsy around 8 months ago. Supposed to go back to [facility] for reevaluation. Last paracentesis . GI consult. On beta-blocker.  -Paracentesis : 5.4 L removed. Holding diuretics due to CANDIE. Staph bacteremia recent history of gram-positive bacteremia  -completed ABX prior to admission. CRP 27. Procalcitonin 0.124.  - BCx : Staph. Start vancomycin. Pharmacy to dose. Consult ID. Repeat blood cultures. Recurrent right hydrothorax  -last thoracentesis . CT chest: Large right pleural effusion.  -Right thoracentesis : 625 mL removed. Pulmonology following. Elevated troponin  -EKG shows no ST changes, however quality is poor. No complaints of chest pain. Appears to be chronically elevated. Troponin 0.014.  - Repeat EKG: NSR. Troponin 0.074. Rise likely due to kidney injury. Chronic hypoxic respiratory failure  -on 2 L nasal cannula at home  3 masses in right lung  -seen on CT chest.  May be atelectasis. - Pulmonology following. Acute kidney injury on CKD 3  - Creatinine 1.7. Hold diuretics. Check UA. Hypomagnesemia  - Magnesium 2.8. Monitor.     HTN  CKD 3  Hypothyroidism  History of alcohol abuse-no longer drinking    Diet ADULT DIET; Regular; Low Sodium (2 gm)    DVT Prophylaxis [x] Lovenox, []  Heparin, [] SCDs, [] Ambulation,  [] Eliquis, [] Xarelto  [] Coumadin   Code Status Full Code   Disposition From: Home  Expected Disposition: TBD  Estimated Date of Discharge: 2/3  Patient requires continued admission due to hepatic encephalopathy   Home O2 NC as per nurse     Subjective/Interval history:   Chief Complaint: Altered Mental Status     Patient is more awake today, he had a paracentesis today and tolerated it well  He has no complaints at this time. Review of Systems:    Negative unless mentioned above    Objective:      Intake/Output Summary (Last 24 hours) at 1/30/2023 1221  Last data filed at 1/30/2023 0640  Gross per 24 hour   Intake --   Output 160 ml   Net -160 ml        Vitals:   /69   Pulse 67   Temp 98 °F (36.7 °C) (Oral)   Resp 17   Ht 5' 8\" (1.727 m)   Wt 213 lb (96.6 kg)   SpO2 100%   BMI 32.39 kg/m²     Physical Exam:   General: NAD, laying in bed  Eyes: no discharge  HENT: NCAT  Cardiovascular: RRR  Respiratory: breath sounds seem better on right side, on NC  Gastrointestinal: Soft, non tender, less distension in abdomen  Genitourinary: no suprapubic tenderness  Musculoskeletal: edema in LE, nontender  Skin: warm, dry, no gross lesions  Neuro: awake and alert, no gross deficits,   Psych: mood appears appropriate    Medications:   Medications:    vancomycin  2,000 mg IntraVENous Once    vancomycin (VANCOCIN) intermittent dosing (placeholder)   Other RX Placeholder    lactulose  20 g Oral Once    DULoxetine  60 mg Oral Daily    pantoprazole  40 mg Oral QAM AC    [Held by provider] furosemide  40 mg Oral Daily    levothyroxine  50 mcg Oral Daily    nadolol  40 mg Oral Nightly    rifAXIMin  550 mg Oral BID    [Held by provider] spironolactone  100 mg Oral BID    thiamine  100 mg Oral Daily    sodium chloride flush  5-40 mL IntraVENous 2 times per day    enoxaparin  40 mg SubCUTAneous Nightly    lactulose  20 g Oral TID      Infusions:    sodium chloride 500 mL (01/30/23 1058)     PRN Meds: ziprasidone, 10 mg, Q6H PRN  ipratropium-albuterol, 1 ampule, Q4H PRN  albuterol sulfate HFA, 1 puff, Q6H PRN  sodium chloride flush, 5-40 mL, PRN  sodium chloride, 500 mL, PRN  ondansetron, 4 mg, Q8H PRN   Or  ondansetron, 4 mg, Q6H PRN  polyethylene glycol, 17 g, Daily PRN  acetaminophen, 650 mg, Q6H PRN   Or  acetaminophen, 650 mg, Q6H PRN  labetalol, 10 mg, Q6H PRN      Labs      Recent Labs     01/28/23  1416 01/29/23  1725 01/30/23  0535   WBC 8.1 5.9 6.8   HGB 12.4* 12.6* 11.2*   HCT 38.0* 38.5* 34.4*    243 191      Recent Labs     01/28/23  1416 01/29/23  1725 01/30/23  0535    139 140   K 3.2* 4.5 4.7    101 103   CO2 24 27 30   PHOS  --   --  3.2   BUN 23 29* 28*   CREATININE 1.2 1.7* 1.7*     Recent Labs     01/28/23  1416 01/29/23  1725 01/30/23  0535   AST 29 47* 36   ALT 17 24 22   BILITOT 0.4 0.9 0.6   ALKPHOS 80 109 94     Recent Labs     01/28/23  1416   INR 1.01     Recent Labs     01/28/23  1416 01/30/23  0535   TROPONINT 0.014* 0.074*     Lab Results   Component Value Date/Time    LABA1C 5.5 12/06/2022 04:17 PM     CALCIUM:  9.4/30 (01/30 0535)  Lab Results   Component Value Date/Time    MG 2.8 01/30/2023 05:35 AM           Electronically signed by Antonio Theodore MD on 1/30/2023 at 12:21 PM

## 2023-01-30 NOTE — PROCEDURES
PROCEDURE PERFORMED: Right Thoracentesis    PRIMARY INDICATION FOR PROCEDURE: plural fluid    INFORMED CONSENT:  Obtained prior to procedure. Consent placed in chart. PT TRANSPORTED FROM:         3026                           TO THE IR ROOM:  large    PT ARRIVED TO IR ROOM AT:  1220                  ASSESSMENT: Patient alert and oriented, verbalizes understanding of procedure    BARRIER PRECAUTIONS & STERILE TECHNIQUE:               Pt transferred to the table and positioned for comfort. Warm blankets given. Pt placed on Cardiac Monitor. Pt prepped and draped in a sterile fashion with chlorhexadine. TIME OUT AT:      3724     Name, , allergies, labs, code status and procedure reviewed during time out.     PAIN/LOCAL ANESTHESIA/SEDATION MANAGEMENT:           Local: Lidocaine 1% given by Dr Rand Almeida @  . [address]: [id number] One step used to acces right plural space and  clear yellow fluid noted 1245 Access removed and dressed          Total 1125 removed          US/FLUORO: 4 US images         STERILE DRESSINGS: 4x4 and Tegaderm     SPECIMENS: 625 ml clear yellow fluid sent and orders placed    EBL:      less than 1 ml    FOLLOW- UP X-RAY: ordered by physician      STAFF IN ROOM:  Enedina Bahena RN, Dr Piña Galway: Jah Messer    PT LEFT IR ROOM AT:

## 2023-01-30 NOTE — PROGRESS NOTES
Pulmonary and Critical Care  Progress Note      VITALS:  /66   Pulse 71   Temp 98.1 °F (36.7 °C) (Oral)   Resp 16   Ht 5' 8\" (1.727 m)   Wt 213 lb (96.6 kg)   SpO2 100%   BMI 32.39 kg/m²     Subjective:   CHIEF COMPLAINT :SOB     HPI:                The patient is a 78 y.o. male is lying in the bed. He is in mild resp distress. He is mildly confused    Objective:   PHYSICAL EXAM:    LUNGS:Decreased air entry right side  Abd-distended, BS+,NT  Ext- 1+ pedal edema  CVS-s1s2 no murmurs      DATA:    CBC:  Recent Labs     01/28/23  1416 01/29/23  1725 01/30/23  0535   WBC 8.1 5.9 6.8   RBC 3.94* 4.02* 3.53*   HGB 12.4* 12.6* 11.2*   HCT 38.0* 38.5* 34.4*    243 191   MCV 96.4 95.8 97.5   MCH 31.5* 31.3* 31.7*   MCHC 32.6 32.7 32.6   RDW 13.9 14.1 13.9   SEGSPCT 70.7* 66.1* 60.3      BMP:  Recent Labs     01/28/23  1416 01/29/23  1725 01/30/23  0535    139 140   K 3.2* 4.5 4.7    101 103   CO2 24 27 30   BUN 23 29* 28*   CREATININE 1.2 1.7* 1.7*   CALCIUM 7.1* 10.2 9.4   GLUCOSE 91 110* 108*      ABG:  No results for input(s): PH, PO2ART, FYS1XMZ, HCO3, BEART, O2SAT in the last 72 hours. BNP  No results found for: BNP   D-Dimer:  Lab Results   Component Value Date    DDIMER 1606 (H) 09/13/2021      Radiology:   No definite pneumothorax appreciated status post right thoracentesis.    Moderate residual right pleural effusion with suspected underlying   atelectatic changes of right lung         Assessment/Plan     Patient Active Problem List    Diagnosis Date Noted    Stage 3 chronic kidney disease ([facility] 01/09/2023     Priority: Medium    Bacteremia due to methicillin resistant Staphylococcus epidermidis 01/09/2023     Priority: Medium    Sleep related hypoxia 12/06/2022     Priority: Medium    AMS (altered mental status) 11/12/2022     Priority: Medium    Hypoxia 11/07/2022     Priority: Medium    Pleural effusion 10/04/2022     Priority: Medium    Recurrent pleural effusion on right 09/09/2022     Priority: Medium    Ex-smoker 08/15/2022     Priority: Medium    Obesity (BMI 30-39.9) 08/15/2022     Priority: Medium    Type 2 diabetes mellitus with chronic kidney disease 07/12/2022     Priority: Medium    Thyroid disease 06/21/2022     Priority: Medium    Cellulitis of left lower extremity 06/08/2022     Priority: Medium    Type 2 diabetes mellitus without complication, without long-term current use of insulin ([facility] 06/08/2022     Priority: Medium    Generalized weakness      Priority: Medium    Oropharyngeal dysphagia      Priority: Medium    Acute kidney injury superimposed on chronic kidney disease ([facility]      Priority: Medium    Acute respiratory failure ([facility] 05/04/2022     Priority: Medium    Recurrent right pleural effusion 05/02/2022     Priority: Medium    Septicemia ([facility] 03/19/2022    Hepatic encephalopathy 03/19/2022    Hearing loss 03/11/2022    Decompensation of cirrhosis of liver ([facility]     Secondary esophageal varices without bleeding (HCC)     Gastric polyps     SOB (shortness of breath) 09/13/2021    Portal hypertension ([facility] 08/30/2021    Bilateral hearing loss 05/07/2021    Increased ammonia level 05/07/2021    Class 3 severe obesity with body mass index (BMI) of 40.0 to 44.9 in adult ([facility] 09/21/2020    Hyperglycemia 12/04/2019    Acquired hypothyroidism 12/04/2019    Pulmonary nodules 09/09/2019    Ascites due to alcoholic cirrhosis ([facility] 02/76/4183    Mixed hyperlipidemia 08/08/2019    Hypertension 08/08/2019    History of alcohol abuse 08/08/2019    Gastroesophageal reflux disease 08/08/2019    Anxiety 08/08/2019    Shortness of breath 05/26/2019    History of colonic polyps 01/15/2019     CANDIE on CKD  Alcoholic Cirrhosis with Portal HTN  Recurrent ascites  Recurrent Right Pleural effusion sec to Hepatic Hydrothorax  Hypothyroidism  Obesity  JENNIFER  Sepsis  Recurrent Ascites     Paracentesis  Thoracentesis  Abx  F/u C&S  Keep sats > 92%  BIPAP qhs and prn  DVT and GI Prophylaxis  Diuresis  Lactulose  Rifaxamin  C/w present management    Electronically signed by Blaise Alas MD on 1/30/2023 at 8:22 PM

## 2023-01-31 PROBLEM — B95.7 COAGULASE NEGATIVE STAPHYLOCOCCUS BACTEREMIA: Status: ACTIVE | Noted: 2023-01-31

## 2023-01-31 PROBLEM — R78.81 COAGULASE NEGATIVE STAPHYLOCOCCUS BACTEREMIA: Status: ACTIVE | Noted: 2023-01-31

## 2023-01-31 PROBLEM — K70.30 ALCOHOLIC CIRRHOSIS (HCC): Status: ACTIVE | Noted: 2023-01-31

## 2023-01-31 LAB
AMMONIA: 85 UMOL/L (ref 16–60)
ANION GAP SERPL CALCULATED.3IONS-SCNC: 8 MMOL/L (ref 4–16)
BUN BLDV-MCNC: 24 MG/DL (ref 6–23)
CALCIUM SERPL-MCNC: 9.1 MG/DL (ref 8.3–10.6)
CHLORIDE BLD-SCNC: 98 MMOL/L (ref 99–110)
CO2: 29 MMOL/L (ref 21–32)
CREAT SERPL-MCNC: 1.6 MG/DL (ref 0.9–1.3)
CULTURE: ABNORMAL
DOSE AMOUNT: NORMAL
DOSE TIME: NORMAL
EKG ATRIAL RATE: 72 BPM
EKG ATRIAL RATE: 81 BPM
EKG DIAGNOSIS: NORMAL
EKG DIAGNOSIS: NORMAL
EKG P AXIS: 0 DEGREES
EKG P AXIS: 56 DEGREES
EKG P-R INTERVAL: 140 MS
EKG P-R INTERVAL: 182 MS
EKG Q-T INTERVAL: 380 MS
EKG Q-T INTERVAL: 408 MS
EKG QRS DURATION: 62 MS
EKG QRS DURATION: 70 MS
EKG QTC CALCULATION (BAZETT): 441 MS
EKG QTC CALCULATION (BAZETT): 446 MS
EKG R AXIS: 29 DEGREES
EKG R AXIS: 48 DEGREES
EKG T AXIS: 20 DEGREES
EKG T AXIS: 33 DEGREES
EKG VENTRICULAR RATE: 72 BPM
EKG VENTRICULAR RATE: 81 BPM
FUNGUS STAIN: NORMAL
GFR SERPL CREATININE-BSD FRML MDRD: 45 ML/MIN/1.73M2
GLUCOSE BLD-MCNC: 111 MG/DL (ref 70–99)
GLUCOSE BLD-MCNC: 156 MG/DL (ref 70–99)
LV EF: 58 %
LVEF MODALITY: NORMAL
Lab: ABNORMAL
Lab: ABNORMAL
Lab: NORMAL
MAGNESIUM: 2.1 MG/DL (ref 1.8–2.4)
POTASSIUM SERPL-SCNC: 4.1 MMOL/L (ref 3.5–5.1)
SODIUM BLD-SCNC: 135 MMOL/L (ref 135–145)
SPECIMEN: ABNORMAL
SPECIMEN: ABNORMAL
SPECIMEN: NORMAL
TROPONIN T: 0.05 NG/ML
VANCOMYCIN RANDOM: 10.9 UG/ML

## 2023-01-31 PROCEDURE — 99232 SBSQ HOSP IP/OBS MODERATE 35: CPT | Performed by: INTERNAL MEDICINE

## 2023-01-31 PROCEDURE — 2700000000 HC OXYGEN THERAPY PER DAY

## 2023-01-31 PROCEDURE — 36415 COLL VENOUS BLD VENIPUNCTURE: CPT

## 2023-01-31 PROCEDURE — 93010 ELECTROCARDIOGRAM REPORT: CPT | Performed by: INTERNAL MEDICINE

## 2023-01-31 PROCEDURE — 82140 ASSAY OF AMMONIA: CPT

## 2023-01-31 PROCEDURE — 82962 GLUCOSE BLOOD TEST: CPT

## 2023-01-31 PROCEDURE — 6360000002 HC RX W HCPCS: Performed by: HOSPITALIST

## 2023-01-31 PROCEDURE — 6360000002 HC RX W HCPCS: Performed by: PHYSICIAN ASSISTANT

## 2023-01-31 PROCEDURE — 97162 PT EVAL MOD COMPLEX 30 MIN: CPT

## 2023-01-31 PROCEDURE — 97165 OT EVAL LOW COMPLEX 30 MIN: CPT

## 2023-01-31 PROCEDURE — 6370000000 HC RX 637 (ALT 250 FOR IP): Performed by: STUDENT IN AN ORGANIZED HEALTH CARE EDUCATION/TRAINING PROGRAM

## 2023-01-31 PROCEDURE — 2580000003 HC RX 258: Performed by: PHYSICIAN ASSISTANT

## 2023-01-31 PROCEDURE — 2580000003 HC RX 258: Performed by: HOSPITALIST

## 2023-01-31 PROCEDURE — 1200000000 HC SEMI PRIVATE

## 2023-01-31 PROCEDURE — 99231 SBSQ HOSP IP/OBS SF/LOW 25: CPT | Performed by: NURSE PRACTITIONER

## 2023-01-31 PROCEDURE — 97535 SELF CARE MNGMENT TRAINING: CPT

## 2023-01-31 PROCEDURE — 80202 ASSAY OF VANCOMYCIN: CPT

## 2023-01-31 PROCEDURE — 97116 GAIT TRAINING THERAPY: CPT

## 2023-01-31 PROCEDURE — 80048 BASIC METABOLIC PNL TOTAL CA: CPT

## 2023-01-31 PROCEDURE — 6370000000 HC RX 637 (ALT 250 FOR IP): Performed by: PHYSICIAN ASSISTANT

## 2023-01-31 PROCEDURE — 87040 BLOOD CULTURE FOR BACTERIA: CPT

## 2023-01-31 PROCEDURE — 84484 ASSAY OF TROPONIN QUANT: CPT

## 2023-01-31 PROCEDURE — 93308 TTE F-UP OR LMTD: CPT

## 2023-01-31 PROCEDURE — 83735 ASSAY OF MAGNESIUM: CPT

## 2023-01-31 PROCEDURE — 99222 1ST HOSP IP/OBS MODERATE 55: CPT | Performed by: SPECIALIST

## 2023-01-31 PROCEDURE — 94761 N-INVAS EAR/PLS OXIMETRY MLT: CPT

## 2023-01-31 RX ADMIN — ENOXAPARIN SODIUM 40 MG: 100 INJECTION SUBCUTANEOUS at 21:17

## 2023-01-31 RX ADMIN — PANTOPRAZOLE SODIUM 40 MG: 40 TABLET, DELAYED RELEASE ORAL at 06:27

## 2023-01-31 RX ADMIN — SODIUM CHLORIDE 25 ML: 9 INJECTION, SOLUTION INTRAVENOUS at 12:28

## 2023-01-31 RX ADMIN — LACTULOSE 20 G: 10 SOLUTION ORAL at 21:17

## 2023-01-31 RX ADMIN — VANCOMYCIN HYDROCHLORIDE 1000 MG: 1 INJECTION, POWDER, LYOPHILIZED, FOR SOLUTION INTRAVENOUS at 12:37

## 2023-01-31 RX ADMIN — NADOLOL 40 MG: 20 TABLET ORAL at 21:17

## 2023-01-31 RX ADMIN — Medication 10 ML: at 21:17

## 2023-01-31 RX ADMIN — LACTULOSE 20 G: 10 SOLUTION ORAL at 09:06

## 2023-01-31 RX ADMIN — LEVOTHYROXINE SODIUM 50 MCG: 0.05 TABLET ORAL at 06:27

## 2023-01-31 RX ADMIN — LACTULOSE 20 G: 10 SOLUTION ORAL at 12:37

## 2023-01-31 RX ADMIN — RIFAXIMIN 550 MG: 550 TABLET ORAL at 09:03

## 2023-01-31 RX ADMIN — RIFAXIMIN 550 MG: 550 TABLET ORAL at 21:17

## 2023-01-31 RX ADMIN — DULOXETINE HYDROCHLORIDE 60 MG: 30 CAPSULE, DELAYED RELEASE ORAL at 09:03

## 2023-01-31 RX ADMIN — Medication 100 MG: at 09:03

## 2023-01-31 NOTE — PROGRESS NOTES
[facility]  consulted by Dr. Colette Rice for monitoring and adjustment. Indication for treatment: Vancomycin indication: Bacteremia  Goal trough: 10-15 mcg/mL  AUC/TRES: 400-600    Risk Factors for MRSA Identified:   Received IV antibiotics within the past 90 days    Pertinent Laboratory Values:   Temp Readings from Last 3 Encounters:   01/31/23 98.3 °F (36.8 °C) (Oral)   01/24/23 97.8 °F (36.6 °C) (Oral)   01/11/23 98.2 °F (36.8 °C) (Oral)     Recent Labs     01/28/23  1416 01/29/23  1725 01/30/23  0535   WBC 8.1 5.9 6.8   LACTATE 0.9  --   --        Recent Labs     01/29/23  1725 01/30/23  0535 01/31/23  0434   BUN 29* 28* 24*   CREATININE 1.7* 1.7* 1.6*       Estimated Creatinine Clearance: 45 mL/min (A) (based on SCr of 1.6 mg/dL (H)). Intake/Output Summary (Last 24 hours) at 1/31/2023 1153  Last data filed at 1/31/2023 1042  Gross per 24 hour   Intake 720 ml   Output --   Net 720 ml         Pertinent Cultures:   Date    Source    Results  1/28   Blood    GPC, 4/4, showing Staph epi 4/4 and CoNS 1/4 1/29   Respiratory PCR  Negative  1/30   Pleural Fluid   NGTD    Vancomycin level:   TROUGH:  No results for input(s): VANCOTROUGH in the last 72 hours. RANDOM:    Recent Labs     01/30/23  0946 01/31/23  0434   VANCORANDOM <4.0 10.9       Assessment:  HPI: Zehra Carreon is a 78 y.o. male who presents with encephalopathy. Patient was recently admitted at the beginning of the month and received IV antibiotics for bacteremia. Blood cultures obtained during this admission showing staph species in 4/4 and vancomycin initiated. SCr, BUN, and urine output:   CANDIE on CKD3, baseline 1.2-1.3  Day(s) of therapy: 2  Vancomycin concentration:   1/31: 10.9, appropriate to re-dose    Plan:  Adjust vancomycin dosing from intermittent to scheduled dosing as patient is a good fit for Bayesian dosing per dosing software.   Continue vancomycin 1000 mg IVPB q24h  Predicted trough: 13.4, AUC 415  Repeat level in 48h  Trough goal adjusted to 10-15 given pathogen ID'd as Staph epi  Pharmacy will continue to monitor patient and adjust therapy as indicated    VANCOMYCIN CONCENTRATION SCHEDULED FOR  2/2 @ 0600    Thank you [facility] the consultC[facility], PharmD  1/31/2023 11:53 AM

## 2023-01-31 NOTE — PROGRESS NOTES
Occupational Therapy    [facility] ACUTE CARE OCCUPATIONAL THERAPY EVALUATION  Solomon Sosa, 1945, 3026/3026-A, 1/31/2023    History  False Pass:  The primary encounter diagnosis was Acute metabolic encephalopathy. Diagnoses of Hyperammonemia ([address] Recurrent right pleural effusion, and Recurrent pleural effusion on right were also pertinent to this visit. Patient  has a past medical history of Anxiety, Cirrhosis, alcoholic ([address] Diabetes mellitus ([address] GERD (gastroesophageal reflux disease), GERD (gastroesophageal reflux disease), Hyperlipidemia, Hypertension, Portal hypertension ([address] Pulmonary nodules, Sleep apnea, SOB (shortness of breath), and Thyroid disease. Patient  has a past surgical history that includes Cholecystectomy; Vasectomy; Colonoscopy; Endoscopy, colon, diagnostic; Foot surgery; Upper gastrointestinal endoscopy (N/A, 1/19/2022); Tonsillectomy; and Appendectomy. Subjective:  Patient states:  \"Can I get washed up? \".     Pain:  No.    Communication with other providers:  Handoff to RN  Restrictions: General Precautions, Fall Risk    Home Setup/Prior level of function    Social/Functional History  Lives With: Spouse  Type of Home: House  Home Layout: One level,Performs ADL's on one level  Home Access: Stairs to enter with rails  Entrance Stairs - Number of Steps: 5  Entrance Stairs - Rails: Both  Bathroom Shower/Tub: Walk-in shower  Bathroom Toilet: Standard  Bathroom Accessibility: Accessible  Has the patient had two or more falls in the past year or any fall with injury in the past year?: Yes (2, tripped on dog, injured both (non hospital))  Receives Help From: Family (2 granddaughters)  ADL Assistance: Independent  Homemaking Assistance: Independent  Homemaking Responsibilities: Yes  Ambulation Assistance: Independent  Transfer Assistance: Independent  Active : Yes          Examination of body systems (includes body structures/functions, activity/participation limitations):  Observation:  Supine in bed upon arrival, agreeable to therapy   Vision:  [facility]  Hearing:  [place]  Cardiopulmonary:  No 02 needs      Body Systems and functions:  ROM R/L:  WFL. Strength R/L:  4+/5,   Sensation: WFL  Tone: Normal  Coordination: WFL  Perception: WNL    Activities of Daily Living (ADLs):  Feeding: Independent  Grooming: SBA (washing hands/face sitting upright on shower chair, brushing hair in stand at sink)  UB bathing: Independent (washing abdomen, trunk, BUE arms sitting upright on shower chair)  LB bathing: SBA (washing cornelio area/buttocks in stand at shower chair, washing upper/lower legs sitting upright on shower chair reaching to floor level)  UB dressing: Independent (doffing/donning gown)  LB dressing: SBA (donning/doffing socks sitting upright on shower chair)  Toileting: SBA (See LB bathing/dressing for details)    Cognitive and Psychosocial Functioning:  Overall cognitive status: WNL  Affect: Normal        Mobility:  Supine to sit: Independent  Transfers: SBA from EOB up to RW, SBA from shower chair up to stand w/ use of grab bars  Sitting balance:  Independent. Standing balance:  SBA. Functional Mobility SBA to/from bathroom w/ RW  Toilet/Shower Transfers: SBA to/from shower chair w/ use of grab bars             AM-PAC Daily Activity Inpatient   How much help for putting on and taking off regular lower body clothing?: A Little  How much help for Bathing?: A Little  How much help for Toileting?: A Little  How much help for putting on and taking off regular upper body clothing?: None  How much help for taking care of personal grooming?: A Little  How much help for eating meals?: None  AM-[facility] Inpatient Daily Activity Raw Score: 20  AM-PAC Inpatient ADL T-Scale Score : 42.03  ADL Inpatient CMS 0-100% Score: 38.32  ADL Inpatient CMS G-Code Modifier : CJ    Treatment:  Self Care Training:   Cues were given for safety, sequence, UE/LE placement, visual cues, and balance. Activities performed today included UB/LB bathing/dressing, toileting, shower transfer, grooming     Safety: patient left in chair with chair alarm, call light within reach, RN notified, gait belt used. Assessment:  Pt is a 77 yo male admitted from home for acute metabolic encephalopathy. Pt at baseline is Independent for ADLs Independent for high level IADLs and Independent for functional transfers/mobility w/ AD. Pt currently presents w/ deficits in ADL and high level IADL independence, functional activity tolerance, dynamic sitting and standing balance and tolerance and functional transfers, BUE strength. Pt would benefit from continued acute care OT services w/ discharge to home w/ home health OT S1 w/ assist PRN  Complexity: Low  Prognosis: Good, no significant barriers to participation at this time. Occupational Therapy Plan  Times Per Week: 1x+  Times Per Day:  Once a day  Current Treatment Recommendations: Strengthening, ROM, Balance training, Functional mobility training, Endurance training, Patient/Caregiver education & training, Positioning, Pain management, Safety education & training, Equipment evaluation, education, & procurement, Self-Care / ADL, Home management training     Equipment: defer    Goals:  Pt goal: go home  Time Frame for STGs: discharge  Goal 1: Pt will perform UE ADLs Independent  Goal 2: Pt will perform LE ADLs Independent  Goal 3: Pt will perform toileting Independent  Goal 4: Pt will perform functional transfer w/ AD Skye  Goal 5: Pt will perform functional mobility w/ AD Skye  Goal 6: Pt will perform therex/theract in order to increase functional activity tolerance and dynamic standing balance    Treatment plan:  Pt will perform functional task in stand reaching in all 3 planes in order to increase dynamic standing balance and functional activity tolerance    Recommendations for NURSING activity: Up to chair for all 3 meals and up to standard commode for all toileting needs    Time:   Time in: 0923  Time out: 1001  Timed treatment minutes: 23 minutes  Total time: 38 minutes    Electronically signed by:    Teresita MARRERO/L [id number]  10:33 AM,1/31/2023

## 2023-01-31 NOTE — PLAN OF CARE
Problem: Discharge Planning  Goal: Discharge to home or other facility with appropriate resources  1/31/2023 1405 by Ozzy Arnold RN  Outcome: Progressing  1/31/2023 0430 by Marcela Mishra RN  Outcome: Progressing     Problem: Pain  Goal: Verbalizes/displays adequate comfort level or baseline comfort level  1/31/2023 1405 by Ozzy Arnold RN  Outcome: Progressing  1/31/2023 0430 by Marcela Mishra RN  Outcome: Progressing     Problem: Skin/Tissue Integrity  Goal: Absence of new skin breakdown  Description: 1. Monitor for areas of redness and/or skin breakdown  2. Assess vascular access sites hourly  3. Every 4-6 hours minimum:  Change oxygen saturation probe site  4. Every 4-6 hours:  If on nasal continuous positive airway pressure, respiratory therapy assess nares and determine need for appliance change or resting period. 1/31/2023 1405 by Ozzy Arnold RN  Outcome: Progressing  1/31/2023 0430 by Marcela Mishra RN  Outcome: Progressing     Problem: Safety - Adult  Goal: Free from fall injury  1/31/2023 1405 by Ozzy Arnold RN  Outcome: Progressing  1/31/2023 0430 by Marcela Mishra RN  Outcome: Progressing

## 2023-01-31 NOTE — PROCEDURES
Richmond Majano is a 78 y.o. male patient. 1. Acute metabolic encephalopathy    2. Hyperammonemia (HCC)      Past Medical History:   Diagnosis Date    Anxiety     Cirrhosis, alcoholic ([facility]     Diabetes mellitus ([facility]     GERD (gastroesophageal reflux disease) 2000    GERD (gastroesophageal reflux disease)     Hyperlipidemia     Hypertension     Portal hypertension (HCC)     Pulmonary nodules     Sleep apnea     SOB (shortness of breath)     Thyroid disease      Blood pressure 112/69, pulse 74, temperature 98.3 °F (36.8 °C), temperature source Oral, resp. rate 16, height 5' 8\" (1.727 m), weight 213 lb (96.6 kg), SpO2 97 %. Thoracentesis    Date/Time: 1/31/2023 9:38 AM  Performed by: Dee Dee Hernandez MD  Authorized by: Dee Dee Hernandez MD   Consent: Verbal consent obtained. Risks and benefits: risks, benefits and alternatives were discussed  Consent given by: patient  Patient understanding: patient states understanding of the procedure being performed  Patient consent: the patient's understanding of the procedure matches consent given  Procedure consent: procedure consent matches procedure scheduled  Relevant documents: relevant documents present and verified  Test results: test results available and properly labeled  Site marked: the operative site was marked  Imaging studies: imaging studies available  Patient identity confirmed: verbally with patient and arm band  Time out: Immediately prior to procedure a \"time out\" was called to verify the correct patient, procedure, equipment, support staff and site/side marked as required. Procedure purpose: diagnostic and therapeutic  Indications: pleural effusion  Preparation: Patient was prepped and draped in the usual sterile fashion.   Local anesthesia used: yes    Anesthesia:  Local anesthesia used: yes  Local Anesthetic: lidocaine 1% without epinephrine  Anesthetic total: 10 mL    Sedation:  Patient sedated: no    Preparation: skin prepped with ChloraPrep  Patient position: sitting  Ultrasound guidance: yes  Location: right posterior  Intercostal space: 7th  Puncture method: over-the-needle catheter  Needle size: 16  Catheter size: 14 gauge  Number of attempts: 1  Drainage amount: 1120 ml  Drainage characteristics: cloudy  Patient tolerance: patient tolerated the procedure well with no immediate complications  Chest x-ray performed: yes  Chest x-ray interpreted by radiologist.      Pleural fluid analysis  CXR STAT    Walter Prieto MD  1/31/2023

## 2023-01-31 NOTE — PROGRESS NOTES
Infectious Disease Progress Note  2023   Patient Name: Annie Michelle : 1945   Impression  Staphylococcus epidermidis Bacteremia: Pathogenic  Large Right Pleural Effusion:  Afebrile, no leukocytosis  CrCl 45  -BC  CoNS, 4 Staphylococcus epidermidis  -CT Chest W Contrast: 1. 3 separate rounded masslike densities identified within the right upper   and middle lobes and right lower lobe which are favored to reflect foci of   rounded atelectasis. Recommend interval follow-up to ensure resolution and   exclude the possibility of underlying pulmonary mass/pulmonary nodule. 2. Large right pleural effusion redemonstrated. 3. Redemonstration of cirrhotic morphology of the liver with large volume   intra-abdominal ascites. 4. Nonspecific mediastinal lymphadenopathy. Last EVA 2023: No evidence of IE  Dr. Vale Bruno, pulmonology, onboard  -S/p IR guided paracentesis of 5004 cc milky/chylous appearing ascitic fluid: cultures-Pending  Fluid analysis: glucose: 122, , PH 8.0, amylase 33  CANDIE:  Baseline Cr 1.3  De-compensated Alcoholic Cirrhosis with Ascites with Acute Hepatic Encephalopathy:  Dr. Baldomero Kenney consulted  Ammonia -119  JENNIFER:  HTN:  HFpEF:  Hearing Impaired:  Pulmonary Nodules:  Obesity:  BMI:  32.66  Multi-Morbidity: per PMHx:Anxiety, alcoholic cirrhosis, GERD, HLD, HTN, portal HTN, pulmonary nodules, JENNIFER, choley     Plan:  Continue IV vancomycin per pharmacy dosing  Trend CRP and Pct, ordered  Repeat BC until negative at 48 hours  Await repeat BC for   Await sensi to Staph epi  Ordered limited Echo to eval for IE   Await IR paracenteses cultures    Ongoing Antimicrobial Therapy  Vancomycin -? Completed Antimicrobial Therapy  ? History:? Interval history noted. Chief complaint:  Staph spp in [facility]. Large right pleural effusion.    Denies n/v/d/f or untoward effects of antibiotics  Physical Exam:  Vital Signs: /69   Pulse 74   Temp 98.3 °F (36.8 °C) (Oral)   Resp 16   Ht 5' 8\" (1.727 m)   Wt 213 lb (96.6 kg)   SpO2 97%   BMI 32.39 kg/m²     Gen: A&O x 4, no distress  Chest: no distress and CTA. Good air movement. Heart: RRR and no MRG. Abd: soft, non-distended, no tenderness, no hepatomegaly. Normoactive bowel sounds. Ext: no clubbing, cyanosis, or edema  Neuro: Mental status intact. CN 2-12 intact and no focal sensory or motor deficits     Radiologic / Imaging / TESTING  1/258/2023 CT Head WO Contrast:  Impression   No acute intracranial abnormality. 1/28/2023 XR Chest Portable:  Impression   Increasing opacity throughout the right hemithorax, which may reflect   increasing layering effusion and/or additional airspace disease. Masslike opacity in the right upper lung again demonstrated. 1/28/2023 CT Chest W Contrast:??  Impression   1. 3 separate rounded masslike densities identified within the right upper   and middle lobes and right lower lobe which are favored to reflect foci of   rounded atelectasis. Recommend interval follow-up to ensure resolution and   exclude the possibility of underlying pulmonary mass/pulmonary nodule. 2. Large right pleural effusion redemonstrated. 3. Redemonstration of cirrhotic morphology of the liver with large volume   intra-abdominal ascites. 4. Nonspecific mediastinal lymphadenopathy. 1/30/2023 IR US Guided Paracentesis:  FINDINGS:   Limited ultrasound of the abdomen demonstrates ascites with paracentesis   catheter within it. A total of 5004 mL milky/chylous appearing ascitic fluid   was removed. 37.5 g intravenous albumin utilized for procedure. No   complication suggested.             Labs:    Recent Results (from the past 24 hour(s))   POCT Glucose    Collection Time: 01/30/23  4:56 PM   Result Value Ref Range    POC Glucose 285 (H) 70 - 99 MG/DL   Urinalysis    Collection Time: 01/30/23  9:47 PM   Result Value Ref Range    Color, UA YELLOW YELLOW    Clarity, UA CLEAR CLEAR Glucose, Urine NEGATIVE NEGATIVE MG/DL    Bilirubin Urine NEGATIVE NEGATIVE MG/DL    Ketones, Urine 15 (A) NEGATIVE MG/DL    Specific Gravity, UA 1.020 1.001 - 1.035    Blood, Urine NEGATIVE NEGATIVE    pH, Urine 6.0 5.0 - 8.0    Protein, UA NEGATIVE NEGATIVE MG/DL    Urobilinogen, Urine 0.2 0.2 - 1.0 MG/DL    Nitrite Urine, Quantitative NEGATIVE NEGATIVE    Leukocyte Esterase, Urine NEGATIVE NEGATIVE    Urinalysis Comments       Microscopic exam not performed based on chemical results unless requested in original order. Troponin    Collection Time: 01/31/23  4:34 AM   Result Value Ref Range    Troponin T 0.052 (H) <0.01 NG/ML   Ammonia    Collection Time: 01/31/23  4:34 AM   Result Value Ref Range    Ammonia 85 (H) 16 - 60 UMOL/L   Vancomycin Level, Random    Collection Time: 01/31/23  4:34 AM   Result Value Ref Range    Vancomycin Rm 10.9 UG/ML    DOSE AMOUNT DOSE AMT.  GIVEN - `     DOSE TIME DOSE TIME GIVEN - `    Magnesium    Collection Time: 01/31/23  4:34 AM   Result Value Ref Range    Magnesium 2.1 1.8 - 2.4 mg/dl   Basic Metabolic Panel    Collection Time: 01/31/23  4:34 AM   Result Value Ref Range    Sodium 135 135 - 145 MMOL/L    Potassium 4.1 3.5 - 5.1 MMOL/L    Chloride 98 (L) 99 - 110 mMol/L    CO2 29 21 - 32 MMOL/L    Anion Gap 8 4 - 16    BUN 24 (H) 6 - 23 MG/DL    Creatinine 1.6 (H) 0.9 - 1.3 MG/DL    Est, Glom Filt Rate 45 (L) >60 mL/min/1.73m2    Glucose 111 (H) 70 - 99 MG/DL    Calcium 9.1 8.3 - 10.6 MG/DL   POCT Glucose    Collection Time: 01/31/23 12:23 PM   Result Value Ref Range    POC Glucose 156 (H) 70 - 99 MG/DL     CULTURE results: Invalid input(s): BLOOD CULTURE,  URINE CULTURE, SURGICAL CULTURE    Diagnosis:  Patient Active Problem List   Diagnosis    Shortness of breath    History of colonic polyps    Ascites due to alcoholic cirrhosis (HCC)    Mixed hyperlipidemia    Hypertension    History of alcohol abuse    Gastroesophageal reflux disease    Anxiety    Pulmonary nodules Hyperglycemia    Acquired hypothyroidism    Class 3 severe obesity with body mass index (BMI) of 40.0 to 44.9 in adult [facility])    Bilateral hearing loss    Increased ammonia level    Portal hypertension (HCC)    SOB (shortness of breath)    Decompensation of cirrhosis of liver (HCC)    Secondary esophageal varices without bleeding (HCC)    Gastric polyps    Hearing loss    Septicemia (HCC)    Acute metabolic encephalopathy    Recurrent right pleural effusion    Acute respiratory failure (HCC)    Acute kidney injury superimposed on chronic kidney disease (HCC)    Generalized weakness    Oropharyngeal dysphagia    Cellulitis of left lower extremity    Type 2 diabetes mellitus without complication, without long-term current use of insulin (HCC)    Thyroid disease    Type 2 diabetes mellitus with chronic kidney disease    Ex-smoker    Obesity (BMI 30-39. 9)    Recurrent pleural effusion on right    Pleural effusion    Hypoxia    AMS (altered mental status)    Sleep related hypoxia    Stage 3 chronic kidney disease ([facility]    Bacteremia due to methicillin resistant Staphylococcus epidermidis       Active Problems  Principal Problem:    Acute metabolic encephalopathy  Resolved Problems:    * No resolved hospital problems. *    Electronically signed by: Electronically signed by Sourav Bell.  MOHINDER Hall CNP on 1/31/2023 at 2:08 PM

## 2023-01-31 NOTE — PROGRESS NOTES
V2.0  [facility] Hospitalist Progress Note      Name:  Camilla Tian /Age/Sex: 1945  (78 y.o. male)   MRN & CSN:  [id number] & [id number] Encounter Date/Time: 2023 11:31 AM EST    Location:  75/6739-L PCP: Alberto Hernandez Day: 4    Assessment and Plan:   Camilla Tian is a 78 y.o. male who presents with encephalopathy      Plan:  Acute metabolic encephalopathy  -likely due to hepatic, and bacteremia. CT head: No acute process. NH3 119. On lactulose and rifaximin. UDS negative. -Mental status appears improved. - Improved NH3 85. Decompensated cirrhosis with recurrent ascites  -complicated by esophageal varices. Deemed not a candidate for TIPS or transplant biopsy around 8 months ago. Supposed to go back to [facility] for reevaluation. Last paracentesis . GI consult. On beta-blocker.  -Paracentesis : 5.4 L removed. Holding diuretics due to CANDIE. Will need paracentesis every 2 weeks. Staph bacteremia recent history of gram-positive bacteremia  -completed ABX prior to admission. CRP 27. Procalcitonin 0.124.  - Started vancomycin. Pharmacy to dose. Repeat blood cultures. -ID following. BCx : Coag negative staph. Checking echo. Recurrent right hydrothorax  -last thoracentesis . CT chest: Large right pleural effusion.  -Right thoracentesis : 1.1 L removed. Pulmonology following. Elevated troponin  -EKG shows no ST changes, however quality is poor. No complaints of chest pain. Appears to be chronically elevated. Troponin 0.014.  - Repeat EKG: NSR. Troponin 0.074. Rise likely due to kidney injury. Chronic hypoxic respiratory failure  -on 2 L nasal cannula at home  3 masses in right lung  -seen on CT chest.  May be atelectasis. - Pulmonology following. Acute kidney injury on CKD 3  - Hold diuretics. Check UA. -Improved creatinine 1.6. Hypermagnesemia  - Magnesium 2.8. Monitor.     HTN  CKD 3  Hypothyroidism  History of alcohol abuse-no longer drinking    Diet ADULT DIET; Regular; Low Sodium (2 gm)    DVT Prophylaxis [x] Lovenox, []  Heparin, [] SCDs, [] Ambulation,  [] Eliquis, [] Xarelto  [] Coumadin   Code Status Full Code   Disposition From: Home  Expected Disposition: Home  Estimated Date of Discharge: 2/3  Patient requires continued admission due to bacteremia   Home O2 NC as per nurse     Subjective/Interval history:   Chief Complaint: Altered Mental Status     Patient states his breathing felt better after having thoracentesis  Otherwise has no other complaints     Review of Systems:    Negative unless mentioned above    Objective:      Intake/Output Summary (Last 24 hours) at 1/31/2023 1027  Last data filed at 1/30/2023 1602  Gross per 24 hour   Intake 480 ml   Output --   Net 480 ml        Vitals:   /69   Pulse 74   Temp 98.3 °F (36.8 °C) (Oral)   Resp 16   Ht 5' 8\" (1.727 m)   Wt 213 lb (96.6 kg)   SpO2 97%   BMI 32.39 kg/m²     Physical Exam:   General: NAD, sitting in chair  Eyes: no discharge  HENT: NCAT  Cardiovascular: RRR  Respiratory: has bandage on back, air movement better on right side  Gastrointestinal: Soft, non tender, nondistended  Genitourinary: no suprapubic tenderness  Musculoskeletal: edema in LE  nontender  Skin: warm, dry, no gross lesions  Neuro: awake and alert, no gross deficits,   Psych: mood appears appropriate    Medications:   Medications:    vancomycin (VANCOCIN) intermittent dosing (placeholder)   Other RX Placeholder    lactulose  20 g Oral Once    DULoxetine  60 mg Oral Daily    pantoprazole  40 mg Oral QAM AC    [Held by provider] furosemide  40 mg Oral Daily    levothyroxine  50 mcg Oral Daily    nadolol  40 mg Oral Nightly    rifAXIMin  550 mg Oral BID    [Held by provider] spironolactone  100 mg Oral BID    thiamine  100 mg Oral Daily    sodium chloride flush  5-40 mL IntraVENous 2 times per day    enoxaparin  40 mg SubCUTAneous Nightly    lactulose  20 g Oral TID      Infusions:    sodium chloride 500 mL (01/30/23 1058)     PRN Meds: sodium chloride flush, 10 mL, PRN  sodium chloride flush, 10 mL, PRN  ziprasidone, 10 mg, Q6H PRN  ipratropium-albuterol, 1 ampule, Q4H PRN  albuterol sulfate HFA, 1 puff, Q6H PRN  sodium chloride flush, 5-40 mL, PRN  sodium chloride, 500 mL, PRN  ondansetron, 4 mg, Q8H PRN   Or  ondansetron, 4 mg, Q6H PRN  polyethylene glycol, 17 g, Daily PRN  acetaminophen, 650 mg, Q6H PRN   Or  acetaminophen, 650 mg, Q6H PRN  labetalol, 10 mg, Q6H PRN      Labs      Recent Labs     01/28/23  1416 01/29/23  1725 01/30/23  0535   WBC 8.1 5.9 6.8   HGB 12.4* 12.6* 11.2*   HCT 38.0* 38.5* 34.4*    243 191      Recent Labs     01/29/23  1725 01/30/23  0535 01/31/23  0434    140 135   K 4.5 4.7 4.1    103 98*   CO2 27 30 29   PHOS  --  3.2  --    BUN 29* 28* 24*   CREATININE 1.7* 1.7* 1.6*     Recent Labs     01/28/23  1416 01/29/23  1725 01/30/23  0535   AST 29 47* 36   ALT 17 24 22   BILITOT 0.4 0.9 0.6   ALKPHOS 80 109 94     Recent Labs     01/28/23  1416   INR 1.01     Recent Labs     01/28/23  1416 01/30/23  0535 01/31/23  0434   TROPONINT 0.014* 0.074* 0.052*     Lab Results   Component Value Date/Time    LABA1C 5.5 12/06/2022 04:17 PM     CALCIUM:  9.1/29 (01/31 0434)  Lab Results   Component Value Date/Time    MG 2.1 01/31/2023 04:34 AM           Electronically signed by Vanessa Barr MD on 1/31/2023 at 10:27 AM

## 2023-01-31 NOTE — PROGRESS NOTES
Physician Progress Note      PATIENT:               Brandon Latham  CSN #:                  [id number]  :                       1945  ADMIT DATE:       2023 2:03 PM  [address] DATE:  RESPONDING  PROVIDER #:        Rashida Allred MD          QUERY TEXT:    Hospitalists,    Pt admitted with decompensated cirrhosis and noted a rise in creatinine. If   possible, please document in the progress notes and discharge summary if you   are evaluating and/or treating any of the following: The medical record reflects the following:  Risk Factors: cirrhosis  Clinical Indicators: creatinine rise from 1.2 to 1.7  Treatment: serial labs, IVF    Defined by Kidney Disease Improving Global Outcomes (KDIGO) clinical practice   guideline for acute kidney injury:  -Increase in SCr by greater than or equal to 0.3 mg/dl within 48 hours; or  -Increase or decrease in SCr to greater than or equal to 1.5 times baseline,   which is known or presumed to have occurred within the prior 7 days; or  -Urine volume < 0.5ml/kg/h for 6 hours    Thank you,  Kalli Tan RN [facility]  [phone]  Options provided:  -- Acute kidney failure  -- Acute kidney injury  -- Other - I will add my own diagnosis  -- Disagree - Not applicable / Not valid  -- Disagree - Clinically unable to determine / Unknown  -- Refer to Clinical Documentation Reviewer    PROVIDER RESPONSE TEXT:    This patient has an Acute kidney injury.     Query created by: Yaron Doan on 2023 10:52 AM      Electronically signed by:  Rashida Allred MD 2023 7:51 AM

## 2023-01-31 NOTE — PROGRESS NOTES
01/31/23 1021   Encounter Summary   Encounter Overview/Reason  Initial Encounter   Service Provided For: Patient   Referral/Consult From: [facility]   Support System Spouse; Children   Last Encounter  01/31/23  (Offered prayer and bible given per patient request.)   Complexity of Encounter Moderate   Begin Time 0950   End Time  1022   Total Time Calculated 32 min   Encounter    Type Initial Screen/Assessment   Spiritual/Emotional needs   Type Spiritual Support   Assessment/Intervention/Outcome   Assessment Calm;Peaceful   Intervention Active listening;Discussed belief system/[employer] practices/clarice;Discussed illness injury and its impact; Discussed relationship with God;Empowerment;Life review/Legacy; Facilitated forgiveness;Prayer (assurance of)/[place];Read/Provided Scripture;Sustaining Presence/Ministry of presence; Other (Comment)  (Biblle given)   Outcome Engaged in conversation;Encouraged;Expressed feelings of Madai, Peace and/or Love;Expressed Gratitude   Plan and Referrals   Plan/Referrals Continue Support (comment)

## 2023-01-31 NOTE — PROGRESS NOTES
Pulmonary and Critical Care  Progress Note      VITALS:  /72   Pulse 75   Temp 97.6 °F (36.4 °C) (Oral)   Resp 16   Ht 5' 8\" (1.727 m)   Wt 213 lb (96.6 kg)   SpO2 95%   BMI 32.39 kg/m²     Subjective:   CHIEF COMPLAINT :SOB     HPI:                The patient is a 78 y.o. male is lying in the bed. He is not in acute resp distress    Objective:   PHYSICAL EXAM:    LUNGS:Decreased air entry right side  Abd-distended, BS+,NT  Ext- 1+ pedal edema  CVS-s1s2 no murmurs      DATA:    CBC:  Recent Labs     01/29/23  1725 01/30/23  0535   WBC 5.9 6.8   RBC 4.02* 3.53*   HGB 12.6* 11.2*   HCT 38.5* 34.4*    191   MCV 95.8 97.5   MCH 31.3* 31.7*   MCHC 32.7 32.6   RDW 14.1 13.9   SEGSPCT 66.1* 60.3      BMP:  Recent Labs     01/29/23  1725 01/30/23  0535 01/31/23  0434    140 135   K 4.5 4.7 4.1    103 98*   CO2 27 30 29   BUN 29* 28* 24*   CREATININE 1.7* 1.7* 1.6*   CALCIUM 10.2 9.4 9.1   GLUCOSE 110* 108* 111*      ABG:  No results for input(s): PH, PO2ART, AUR3MDZ, HCO3, BEART, O2SAT in the last 72 hours. BNP  No results found for: BNP   D-Dimer:  Lab Results   Component Value Date    DDIMER 1606 (H) 09/13/2021      Radiology:   No definite pneumothorax appreciated status post right thoracentesis.    Moderate residual right pleural effusion with suspected underlying   atelectatic changes of right lung         Assessment/Plan     Patient Active Problem List    Diagnosis Date Noted    Alcoholic cirrhosis ([facility] 23/25/3663     Priority: Medium    Coagulase negative Staphylococcus bacteremia 01/31/2023     Priority: Medium    Stage 3 chronic kidney disease ([facility] 01/09/2023     Priority: Medium    Bacteremia due to methicillin resistant Staphylococcus epidermidis 01/09/2023     Priority: Medium    Sleep related hypoxia 12/06/2022     Priority: Medium    AMS (altered mental status) 11/12/2022     Priority: Medium    Hypoxia 11/07/2022     Priority: Medium    Pleural effusion 10/04/2022 Priority: Medium    Recurrent pleural effusion on right 09/09/2022     Priority: Medium    Ex-smoker 08/15/2022     Priority: Medium    Obesity (BMI 30-39.9) 08/15/2022     Priority: Medium    Type 2 diabetes mellitus with chronic kidney disease 07/12/2022     Priority: Medium    Thyroid disease 06/21/2022     Priority: Medium    Cellulitis of left lower extremity 06/08/2022     Priority: Medium    Type 2 diabetes mellitus without complication, without long-term current use of insulin ([facility] 06/08/2022     Priority: Medium    Generalized weakness      Priority: Medium    Oropharyngeal dysphagia      Priority: Medium    Acute kidney injury superimposed on chronic kidney disease ([facility]      Priority: Medium    Acute respiratory failure ([facility] 05/04/2022     Priority: Medium    Recurrent right pleural effusion 05/02/2022     Priority: Medium    Septicemia ([facility] 91/69/2965    Acute metabolic encephalopathy 86/33/0605    Hearing loss 03/11/2022    Decompensation of cirrhosis of liver ([facility]     Secondary esophageal varices without bleeding (HCC)     Gastric polyps     SOB (shortness of breath) 09/13/2021    Portal hypertension ([facility] 08/30/2021    Bilateral hearing loss 05/07/2021    Increased ammonia level 05/07/2021    Class 3 severe obesity with body mass index (BMI) of 40.0 to 44.9 in adult ([facility] 09/21/2020    Hyperglycemia 12/04/2019    Acquired hypothyroidism 12/04/2019    Pulmonary nodules 09/09/2019    Ascites due to alcoholic cirrhosis ([facility] 32/76/0106    Mixed hyperlipidemia 08/08/2019    Hypertension 08/08/2019    History of alcohol abuse 08/08/2019    Gastroesophageal reflux disease 08/08/2019    Anxiety 08/08/2019    Shortness of breath 05/26/2019    History of colonic polyps 01/15/2019     CANDIE on CKD  Alcoholic Cirrhosis with Portal HTN  Recurrent ascites  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

## 2023-01-31 NOTE — PROGRESS NOTES
Physical Therapy  [facility] ACUTE CARE PHYSICAL THERAPY EVALUATION  Shani Garay, 1945, 3026/3026-A, 1/31/2023    History  [place]:  The primary encounter diagnosis was Acute metabolic encephalopathy. Diagnoses of Hyperammonemia ([address] Recurrent right pleural effusion, and Recurrent pleural effusion on right were also pertinent to this visit. Patient  has a past medical history of Anxiety, Cirrhosis, alcoholic ([address] Diabetes mellitus ([address] GERD (gastroesophageal reflux disease), GERD (gastroesophageal reflux disease), Hyperlipidemia, Hypertension, Portal hypertension ([address] Pulmonary nodules, Sleep apnea, SOB (shortness of breath), and Thyroid disease. Patient  has a past surgical history that includes Cholecystectomy; Vasectomy; Colonoscopy; Endoscopy, colon, diagnostic; Foot surgery; Upper gastrointestinal endoscopy (N/A, 1/19/2022); Tonsillectomy; and Appendectomy.     Subjective:  Patient states:  \"I just feel full\"  Pain:  denies   Restrictions: general precautions, falls     Home Setup/Prior level of function  Social/Functional History  Lives With: Spouse-home with pt 24/7  Type of Home: House  Home Layout: One level,Performs ADL's on one level  Home Access: Stairs to enter with rails  Entrance Stairs - Number of Steps: 4  Entrance Stairs - Rails: Both  Bathroom Shower/Tub: Walk-in shower  Bathroom Toilet: Standard  Bathroom Accessibility: Accessible  DME: RW, cane, 2.5L O2 at night   Has the patient had two or more falls in the past year or any fall with injury in the past year?: Yes (2, tripped on dog, injured both (non hospital))  Receives Help From: Family (2 granddaughters)  ADL Assistance: Independent  Homemaking Assistance: Independent-shared with spouse   Homemaking Responsibilities: Yes  Ambulation Assistance: Independent-no AD at baseline   Transfer Assistance: Independent  Active : Yes though wife does most of the driving      Examination of body systems (includes body structures/functions, activity/participation limitations):  Observation:  Supine in bed upon arrival. Cooperative with therapy   Vision:  [facility]  Hearing:  [facility]   Cardiopulmonary:  Room air, SpO2 98% and HR 80bpm with activity. Orientation: oriented to person, place, year. Slightly disoriented to date (thought it was Feb 1)    Musculoskeletal  ROM R/L:  [facility] BLEs  Strength R/L:  BLES grossly WFL in function and endurance. Mobility/treatment:   Rolling L/R:  NT   Supine to sit:  Skye with HOB slightly elevated  Sit to supine: Skye with HOB slightly elevated  Transfers:    Sit to stand: SBA for safety from EOB  Stand to sit: SBA for safety to EOB  Step pivot: SBA for safety without AD   Sitting balance:  Skye at EOB, static and light dynamic without UE support   Standing balance:  CGA to SBA for safety, static and light dynamic without UE support   Gait: ~400ft without AD requiring mostly CGA to close SBA. Pt did have one lateral LOB with head turn and conversation with inc distraction needing Loren to recover. Fair and consistent pace with reciprocal gait pattern. Educated pt on POC, role of PT, discharge. [facility] 6 Clicks Inpatient Mobility:  AM-PAC Inpatient Mobility Raw Score : 20    Safety: patient left in bed with alarm, call light within reach, gait belt used. Assessment:  Pt is a 78year old male admitted with acute metabolic encephalopathy, decompensated cirrhosis with recurrent ascites, and pleural fluid. He is s/p paracentesis and right thoracentesis on 1/30. Recommend home with 24/7 supervision and [facility] PT once medically stable. At baseline he is indep with gross mobility and ADLs. He performed well this date though slightly below baseline with dec balance. He would benefit from continued therapy to address his current deficits, dec potential fall risk, dec burden of care, and restore PLOF. Complexity: Moderate  Prognosis: Good, no significant barriers to participation at this time.    Plan: 3+x/week  Discharge Recommendations: 24 hour supervision or assist, Home with Home health PT  Equipment:  shower chair     Goals:  Short Term Goals  Time Frame for Short Term Goals: 2 weeks  Short Term Goal 1: Pt will transfer to all surfaces Skye  Short Term Goal 2: Pt will ambulate 400ft with LRAD Skye  Short Term Goal 3: Pt will ascend/descend 4 steps, 1-2 rails SBA  Short Term Goal 4: Pt will perform standing light dynamic activity x 3 minutes without UE support Skye       Treatment plan:  Bed mobility, transfers, balance, gait, TA, TX, stairs     Recommendations for NURSING mobility: ambulate with gait belt as time permits     Time:   Time in: 1323  Time out: 1346  Timed treatment minutes: 13  Total time: 23 minutes     Electronically signed by:    Reina Crenshaw [id number]  1/31/2023, 1:50 PM

## 2023-01-31 NOTE — CONSULTS
[address] [facility]             MD Raysa Thakkar MD Clyda Moss, APRN-CNP             [address] [facility] [place], [address]             [phone] fax [phone]           Reason for Consult:  hepatic encephalopathy    Primary Care Physician:  Cecilia Britt MD    History Obtained From:  patient,    CC: confusion    HISTORY OF PRESENT ILLNESS:              The patient is a 78 y.o.  male known to me with cirrhosis (felt to be alcohol-induced) who has been sober for many months. His cirrhosis is complicated by moderate-large varices diagnosed by EGD 1/19/22 (which have never bled- currently on Nadolol), hepatic encephalopathy, recurrent ascites (despite maximal tolerated doses of Lasix and Aldactone) and hepatic hydrothorax. He has been refractory to diuretics and is being managed by regular paracentesis every 2 weeks and thoracentesis as needed. He was referred to [facility] 6 months ago for transplant and TIPS evaluation. It was felt that he was not a good candidate for TIPS as it likely would aggravate his underlying PSE. His MELD at that time was 10 so he was not seriously considered for transplant. They recommended follow up with hepatology but that has not been done since June   He was recently hospitalized with a similar presentation and found to have staph epi on 4 blood cultures with no definite source identified and a negative EVA. His AFP was 2 on 1/4/23   He was admitted now again with confusion and an elevated ammonia level. he denies abd pain, vomiting, melena, hematochezia, vomiting    Past Medical History:        Diagnosis Date    Anxiety     Cirrhosis, alcoholic ([address]     Diabetes mellitus ([facility]     GERD (gastroesophageal reflux disease) 2000    GERD (gastroesophageal reflux disease)     Hyperlipidemia     Hypertension     Portal hypertension (HCC)     Pulmonary nodules     Sleep apnea     SOB (shortness of breath) Thyroid disease        Past Surgical History:        Procedure Laterality Date    APPENDECTOMY      CHOLECYSTECTOMY      COLONOSCOPY      ENDOSCOPY, COLON, DIAGNOSTIC      FOOT SURGERY      needle removed,not sure which foot, per wife. TONSILLECTOMY      UPPER GASTROINTESTINAL ENDOSCOPY N/A 1/19/2022    EGD BIOPSY performed by April Christy MD at [address] [place] [address]         Medications Prior to Admission:    Prior to Admission medications    Medication Sig Start Date End Date Taking? Authorizing Provider   rifAXIMin (XIFAXAN) 550 MG tablet Take 1 tablet by mouth 2 times daily 1/11/23   Holly Batista MD   thiamine 100 MG tablet Take 1 tablet by mouth daily 1/12/23   Holly Batista MD   furosemide (LASIX) 40 MG tablet Take 1 tablet by mouth daily 11/22/22   Lia Garcia MD   levothyroxine (SYNTHROID) 50 MCG tablet Take 1 tablet by mouth Daily 10/14/22 4/12/23  Lia Garcia MD   albuterol sulfate HFA (PROVENTIL;VENTOLIN;PROAIR) 108 (90 Base) MCG/ACT inhaler INHALE 2 PUFFS BY MOUTH 4 TIMES DAILY AS NEEDED FOR WHEEZING 10/14/22   Lia Garcia MD   DULoxetine (CYMBALTA) 60 MG extended release capsule TAKE 1 CAPSULE BY MOUTH ONCE DAILY 10/14/22 8/18/23  Lia Garcia MD   betamethasone valerate (VALISONE) 0.1 % lotion Apply topically 2 times daily prn scalp rash 10/14/22   Lia Garcia MD   spironolactone (ALDACTONE) 100 MG tablet Take 100 mg by mouth 2 times daily    Historical Provider, MD   nadolol (CORGARD) 40 MG tablet Take 1 tablet by mouth in the morning.   Patient taking differently: Take 40 mg by mouth nightly 7/20/22 1/23/23  Phu Eid, APRN - CNP   lactulose [facility]) 10 GM/15ML solution Take 30 mLs by mouth 3 times daily Hold if having diarrhea 3/21/22   Tami Cooper MD   Multiple Vitamin (MULTI VITAMIN MENS PO) Take 1 tablet by mouth daily    Historical Provider, MD   ESOMEPRAZOLE MAGNESIUM PO Take 20 mg by mouth daily OTC Historical Provider, MD   [place]-3 1000 MG CAPS Take 2,000 mg by mouth daily    Historical Provider, MD       Allergies: Allergies   Allergen Reactions    Lisinopril Swelling     Tongue swelling      Zetia [Ezetimibe] Swelling     Facial swelling    Atorvastatin     Codeine Other (See Comments)     Blood pressure drops    Hydrochlorothiazide     Hydroxyzine      Fatigue and depressed    Lexapro [Escitalopram Oxalate]      Increased depression    Pravastatin    . Social History:    TOBACCO:  No  ETOH:  previously- reportedly sober for months    Family History: reviewed and positives included in HPI- all other pertinent GI family history negative      REVIEW OF SYSTEMS: see HPI for positives and pertinent negatives. All other systems reviewed and are negative    PHYSICAL EXAM:    Vitals:  /81   Pulse 74   Temp 97.6 °F (36.4 °C) (Oral)   Resp 18   Ht 5' 8\" (1.727 m)   Wt 213 lb (96.6 kg)   SpO2 100%   BMI 32.39 kg/m²   CONSTITUTIONAL: alert, cooperative, no apparent distress,   EYES:  pupils equal, round and reactive to light and sclera clear  ENT:  normocepalic, without obvious abnormality  NECK:  supple, symmetrical, trachea midline  HEMATOLOGIC/LYMPHATICS:  no cervical lymphadenopathy and no supraclavicular lymphadenopathy  LUNGS:  clear to auscultation  CARDIOVASCULAR:  regular rate and rhythm and no murmur noted  ABDOMEN:  Soft, non-tender with normal bowel sounds.  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), PSE- MELD currently 12  2) stage 1-2 PSE - probably precipitated by infection (staph bacteremia) and some CANDIE- continue lactulose and Xifaxan  3) continued staph bacteremia  4) CANDIE  5) refractory ascites with hepatic hydrothorax (chylous)-- reasonably managed recently with diuretics, paracentesis every 2 weeks and thoracentesis prn--difficult problem- not a candidate for TIPS due to PSE and MELD likely too low for transplant, although have recommended repeat evaluation at [address]:  1) ID managing the bacteremia  2) titrate lactulose to produce 2-4 BM/d and continue Xifaxan 550 BID  3) paracentesis every 2 weeks regularly and also prn  4) thoracentesis prn  5) monitor BUN/creatinine- if remain elevated, would get nephrology consult        Felix Day M.D

## 2023-02-01 ENCOUNTER — HOSPITAL ENCOUNTER (OUTPATIENT)
Dept: INTERVENTIONAL RADIOLOGY/VASCULAR | Age: 78
Discharge: HOME | End: 2023-02-01

## 2023-02-01 LAB
AMMONIA: 171 UMOL/L (ref 16–60)
ANION GAP SERPL CALCULATED.3IONS-SCNC: 11 MMOL/L (ref 4–16)
BUN SERPL-MCNC: 29 MG/DL (ref 6–23)
CALCIUM SERPL-MCNC: 9 MG/DL (ref 8.3–10.6)
CHLORIDE BLD-SCNC: 99 MMOL/L (ref 99–110)
CO2: 24 MMOL/L (ref 21–32)
CREAT SERPL-MCNC: 1.6 MG/DL (ref 0.9–1.3)
CRP SERPL HS-MCNC: 35.3 MG/L
GFR SERPL CREATININE-BSD FRML MDRD: 45 ML/MIN/1.73M2
GLUCOSE BLD-MCNC: 119 MG/DL (ref 70–99)
GLUCOSE BLD-MCNC: 119 MG/DL (ref 70–99)
GLUCOSE BLD-MCNC: 120 MG/DL (ref 70–99)
GLUCOSE SERPL-MCNC: 150 MG/DL (ref 70–99)
LACTATE DEHYDROGENASE: 224 IU/L (ref 120–246)
MAGNESIUM: 1.9 MG/DL (ref 1.8–2.4)
POTASSIUM SERPL-SCNC: 4.2 MMOL/L (ref 3.5–5.1)
PROCALCITONIN SERPL-MCNC: 0.29 NG/ML
SODIUM BLD-SCNC: 134 MMOL/L (ref 135–145)
TROPONIN T: 0.04 NG/ML

## 2023-02-01 PROCEDURE — 36415 COLL VENOUS BLD VENIPUNCTURE: CPT

## 2023-02-01 PROCEDURE — 2580000003 HC RX 258: Performed by: HOSPITALIST

## 2023-02-01 PROCEDURE — 6360000002 HC RX W HCPCS: Performed by: PHYSICIAN ASSISTANT

## 2023-02-01 PROCEDURE — 6370000000 HC RX 637 (ALT 250 FOR IP): Performed by: STUDENT IN AN ORGANIZED HEALTH CARE EDUCATION/TRAINING PROGRAM

## 2023-02-01 PROCEDURE — 83735 ASSAY OF MAGNESIUM: CPT

## 2023-02-01 PROCEDURE — 84145 PROCALCITONIN (PCT): CPT

## 2023-02-01 PROCEDURE — 1200000000 HC SEMI PRIVATE

## 2023-02-01 PROCEDURE — 86140 C-REACTIVE PROTEIN: CPT

## 2023-02-01 PROCEDURE — 97530 THERAPEUTIC ACTIVITIES: CPT

## 2023-02-01 PROCEDURE — 94660 CPAP INITIATION&MGMT: CPT

## 2023-02-01 PROCEDURE — 6370000000 HC RX 637 (ALT 250 FOR IP): Performed by: PHYSICIAN ASSISTANT

## 2023-02-01 PROCEDURE — 83615 LACTATE (LD) (LDH) ENZYME: CPT

## 2023-02-01 PROCEDURE — 97110 THERAPEUTIC EXERCISES: CPT

## 2023-02-01 PROCEDURE — 82962 GLUCOSE BLOOD TEST: CPT

## 2023-02-01 PROCEDURE — 99232 SBSQ HOSP IP/OBS MODERATE 35: CPT | Performed by: INTERNAL MEDICINE

## 2023-02-01 PROCEDURE — 99231 SBSQ HOSP IP/OBS SF/LOW 25: CPT | Performed by: NURSE PRACTITIONER

## 2023-02-01 PROCEDURE — 80048 BASIC METABOLIC PNL TOTAL CA: CPT

## 2023-02-01 PROCEDURE — 94761 N-INVAS EAR/PLS OXIMETRY MLT: CPT

## 2023-02-01 PROCEDURE — 6360000002 HC RX W HCPCS: Performed by: HOSPITALIST

## 2023-02-01 PROCEDURE — 84484 ASSAY OF TROPONIN QUANT: CPT

## 2023-02-01 PROCEDURE — 2580000003 HC RX 258: Performed by: PHYSICIAN ASSISTANT

## 2023-02-01 PROCEDURE — 82140 ASSAY OF AMMONIA: CPT

## 2023-02-01 RX ADMIN — LACTULOSE 20 G: 10 SOLUTION ORAL at 20:53

## 2023-02-01 RX ADMIN — ENOXAPARIN SODIUM 40 MG: 100 INJECTION SUBCUTANEOUS at 20:18

## 2023-02-01 RX ADMIN — PANTOPRAZOLE SODIUM 40 MG: 40 TABLET, DELAYED RELEASE ORAL at 05:50

## 2023-02-01 RX ADMIN — RIFAXIMIN 550 MG: 550 TABLET ORAL at 20:19

## 2023-02-01 RX ADMIN — DULOXETINE HYDROCHLORIDE 60 MG: 30 CAPSULE, DELAYED RELEASE ORAL at 10:42

## 2023-02-01 RX ADMIN — NADOLOL 40 MG: 20 TABLET ORAL at 20:18

## 2023-02-01 RX ADMIN — LACTULOSE 20 G: 10 SOLUTION ORAL at 10:43

## 2023-02-01 RX ADMIN — Medication 10 ML: at 20:19

## 2023-02-01 RX ADMIN — Medication 10 ML: at 10:48

## 2023-02-01 RX ADMIN — RIFAXIMIN 550 MG: 550 TABLET ORAL at 10:43

## 2023-02-01 RX ADMIN — Medication 100 MG: at 10:43

## 2023-02-01 RX ADMIN — VANCOMYCIN HYDROCHLORIDE 1000 MG: 1 INJECTION, POWDER, LYOPHILIZED, FOR SOLUTION INTRAVENOUS at 14:38

## 2023-02-01 RX ADMIN — LACTULOSE 20 G: 10 SOLUTION ORAL at 14:42

## 2023-02-01 RX ADMIN — LEVOTHYROXINE SODIUM 50 MCG: 0.05 TABLET ORAL at 05:50

## 2023-02-01 ASSESSMENT — PAIN SCALES - WONG BAKER
WONGBAKER_NUMERICALRESPONSE: 0

## 2023-02-01 ASSESSMENT — PAIN SCALES - GENERAL: PAINLEVEL_OUTOF10: 0

## 2023-02-01 NOTE — CONSULTS
Consult completed. Lab draw, one set of blood cultures from the left and one set of blood cultures from the right. Yoan Cornejo, primary RN notified.

## 2023-02-01 NOTE — PROGRESS NOTES
Pulmonary and Critical Care  Progress Note      VITALS:  /74   Pulse 78   Temp 97.8 °F (36.6 °C) (Oral)   Resp 15   Ht 5' 8\" (1.727 m)   Wt 210 lb 1.6 oz (95.3 kg)   SpO2 100%   BMI 31.95 kg/m²     Subjective:   CHIEF COMPLAINT :SOB     HPI:                The patient is a 78 y.o. male is sitting in the chair. He is still little confused. He is not in acute resp distress    Objective:   PHYSICAL EXAM:    LUNGS:Decreased air entry right side  Abd-distended, BS+,NT  Ext- 1+ pedal edema  CVS-s1s2 no murmurs      DATA:    CBC:  Recent Labs     01/29/23  1725 01/30/23  0535   WBC 5.9 6.8   RBC 4.02* 3.53*   HGB 12.6* 11.2*   HCT 38.5* 34.4*    191   MCV 95.8 97.5   MCH 31.3* 31.7*   MCHC 32.7 32.6   RDW 14.1 13.9   SEGSPCT 66.1* 60.3      BMP:  Recent Labs     01/30/23  0535 01/31/23  0434 02/01/23  0632    135 134*   K 4.7 4.1 4.2    98* 99   CO2 30 29 24   BUN 28* 24* 29*   CREATININE 1.7* 1.6* 1.6*   CALCIUM 9.4 9.1 9.0   GLUCOSE 108* 111* 150*      ABG:  No results for input(s): PH, PO2ART, AAB5LIG, HCO3, BEART, O2SAT in the last 72 hours.   BNP  No results found for: BNP   D-Dimer:  Lab Results   Component Value Date    DDIMER 1606 (H) 09/13/2021      Radiology: None      Assessment/Plan     Patient Active Problem List    Diagnosis Date Noted    Alcoholic cirrhosis ([facility] 79/53/0416     Priority: Medium    Coagulase negative Staphylococcus bacteremia 01/31/2023     Priority: Medium    Stage 3 chronic kidney disease ([facility] 01/09/2023     Priority: Medium    Bacteremia due to methicillin resistant Staphylococcus epidermidis 01/09/2023     Priority: Medium    Sleep related hypoxia 12/06/2022     Priority: Medium    AMS (altered mental status) 11/12/2022     Priority: Medium    Hypoxia 11/07/2022     Priority: Medium    Pleural effusion 10/04/2022     Priority: Medium    Recurrent pleural effusion on right 09/09/2022     Priority: Medium    Ex-smoker 08/15/2022     Priority: Medium    Obesity (BMI 30-39.9) 08/15/2022     Priority: Medium    Type 2 diabetes mellitus with chronic kidney disease 07/12/2022     Priority: Medium    Thyroid disease 06/21/2022     Priority: Medium    Cellulitis of left lower extremity 06/08/2022     Priority: Medium    Type 2 diabetes mellitus without complication, without long-term current use of insulin ([facility] 06/08/2022     Priority: Medium    Generalized weakness      Priority: Medium    Oropharyngeal dysphagia      Priority: Medium    Acute kidney injury superimposed on chronic kidney disease ([address]      Priority: Medium    Acute respiratory failure ([address] 05/04/2022     Priority: Medium    Recurrent right pleural effusion 05/02/2022     Priority: Medium    Septicemia ([facility] 45/58/7625    Acute metabolic encephalopathy 16/19/1095    Hearing loss 03/11/2022    Decompensation of cirrhosis of liver ([facility]     Secondary esophageal varices without bleeding (HCC)     Gastric polyps     SOB (shortness of breath) 09/13/2021    Portal hypertension ([facility] 08/30/2021    Bilateral hearing loss 05/07/2021    Increased ammonia level 05/07/2021    Class 3 severe obesity with body mass index (BMI) of 40.0 to 44.9 in adult ([facility] 09/21/2020    Hyperglycemia 12/04/2019    Acquired hypothyroidism 12/04/2019    Pulmonary nodules 09/09/2019    Ascites due to alcoholic cirrhosis ([facility] 87/26/3650    Mixed hyperlipidemia 08/08/2019    Hypertension 08/08/2019    History of alcohol abuse 08/08/2019    Gastroesophageal reflux disease 08/08/2019    Anxiety 08/08/2019    Shortness of breath 05/26/2019    History of colonic polyps 01/15/2019     CANDIE on CKD  Alcoholic Cirrhosis with Portal HTN  Recurrent ascites  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

## 2023-02-01 NOTE — PROGRESS NOTES
[address] [facility]  consulted by Dr. Melani Rea for monitoring and adjustment. Indication for treatment: Vancomycin indication: Bacteremia  Goal trough: 10-15 mcg/mL  AUC/TRES: 400-600    Risk Factors for MRSA Identified:   Received IV antibiotics within the past 90 days    Pertinent Laboratory Values:   Temp Readings from Last 3 Encounters:   02/01/23 97.8 °F (36.6 °C) (Oral)   01/24/23 97.8 °F (36.6 °C) (Oral)   01/11/23 98.2 °F (36.8 °C) (Oral)     Recent Labs     01/29/23  1725 01/30/23  0535   WBC 5.9 6.8     Recent Labs     01/30/23  0535 01/31/23  0434 02/01/23  0632   BUN 28* 24* 29*   CREATININE 1.7* 1.6* 1.6*     Estimated Creatinine Clearance: 45 mL/min (A) (based on SCr of 1.6 mg/dL (H)). Intake/Output Summary (Last 24 hours) at 2/1/2023 1248  Last data filed at 2/1/2023 1048  Gross per 24 hour   Intake 964.97 ml   Output --   Net 964.97 ml       Pertinent Cultures:   Date    Source    Results  1/28   Blood    GPC, 4/4, showing Staph epi 4/4 and CoNS 1/4 1/29   Respiratory PCR  Negative  1/30   Pleural Fluid   NGTD    Vancomycin level:   TROUGH:  No results for input(s): VANCOTROUGH in the last 72 hours. RANDOM:    Recent Labs     01/30/23  0946 01/31/23  0434   VANCORANDOM <4.0 10.9       Assessment:  HPI: Sanjuana Sellers is a 78 y.o. male who presents with encephalopathy. Patient was recently admitted at the beginning of the month and received IV antibiotics for bacteremia. Blood cultures obtained during this admission showing staph species in 4/4 and vancomycin initiated.   SCr, BUN, and urine output:   CANDIE on CKD3, baseline 1.2-1.3  Day(s) of therapy: 3  Vancomycin concentration:   1/31: 10.9, appropriate to re-dose    Plan:  Continue vancomycin 1000 mg IVPB q24h  Predicted trough: 13.4,   Repeat level in AM  Pharmacy will continue to monitor patient and adjust therapy as indicated    VANCOMYCIN CONCENTRATION SCHEDULED FOR  2/2 @ 0600    Thank you for the consult,  Jamie Kimbrough [facility] [place], PharmD  2/1/2023 12:48 PM

## 2023-02-01 NOTE — PROGRESS NOTES
Physical Therapy  Name: Chapincito Alicia MRN: [id number] :   1945   Date:  2023   Admission Date: 2023 Room:  [address] [place] [address]   Restrictions/Precautions:        general precautions, falls  Communication with other providers:  Alise Connors RN states pt is ok to see for therapy  Subjective:  Patient states:  '' I feel like shit ''   Pain:   Location, Type, Intensity (0/10 to 10/10):  denies pain  Objective:    Observation:  pt in side lying in bed. Treatment, including education/measures:  Pt agreeable to therapy but lethargic and confused, requiring multiple repeated VC and frequent TC to stay on task and correctly complete all exercises. Occ AAROM to coordinate movements during exercises. Very slow progress. When asked to sit EOB he performed 2x due to confusion. [place]. Transfers with line management of tele  Rolling: SBA  Supine to sit :SBA  Sit to supine :SBA  Scooting :SBA  Sit to stand :SBA  Stand to sit :SBA  SPT:SBA    Gait: deferred due to impulsive behavior and safety concerns  Sitting Exercises: Ankle pumps x 20  Heel raises  x 20  LAQ's x 20  Marching x 20   Hip Abd x 20  Clams x 20  Therapeutic Exercise:  Therapeutic exercises were instructed today. Cues were given for technique, safety, recruitment, and rationale. Cues were verbal and/or tactile. Safety  Patient left safely in the recliner, with call light/phone in reach with alarm applied. Gait belt was used for transfers and gait.   Assessment / Impression:     Patient's tolerance of treatment:  fair   Adverse Reaction: no  Significant change in status and impact:  no  Barriers to improvement:  cognition  Plan for Next Session:    Will cont to work towards pt's goals per patient tolerance  Time in:  11:28  Time out:  11:53  Timed treatment minutes:  25  Total treatment time:  25  Previously filed items:     Short Term Goals  Time Frame for Short Term Goals: 2 weeks  Short Term Goal 1: Pt will transfer to all surfaces Skye  Short Term Goal 2: Pt will ambulate 400ft with LRAD Skye  Short Term Goal 3: Pt will ascend/descend 4 steps, 1-2 rails SBA  Short Term Goal 4: Pt will perform standing light dynamic activity x 3 minutes without UE support Skye     Electronically signed by:    Katharine Guerrero PTA PTA  2/1/2023, 11:56 AM

## 2023-02-01 NOTE — PROGRESS NOTES
Infectious Disease Progress Note  2023   Patient Name: Tunde Menendez : 1945   Impression  Staphylococcus epidermidis Bacteremia: Pathogenic, Likely Secondary to Gut Translocation:  Possible Endocarditis:  Afebrile, no leukocytosis  Pct has trended up slightly  CrCl 45  -BC  CoNS, 4 Staphylococcus epidermidis  -CT Chest W Contrast: 1. 3 separate rounded masslike densities identified within the right upper   and middle lobes and right lower lobe which are favored to reflect foci of   rounded atelectasis. Recommend interval follow-up to ensure resolution and   exclude the possibility of underlying pulmonary mass/pulmonary nodule. 2. Large right pleural effusion redemonstrated. 3. Redemonstration of cirrhotic morphology of the liver with large volume   intra-abdominal ascites. 4. Nonspecific mediastinal lymphadenopathy. Last EVA 2023: No evidence of IE  Dr. Aileen Velasquez, pulmonology, onboard  -S/p IR guided paracentesis of 5004 cc milky/chylous appearing ascitic fluid: cultures-Pending  Fluid analysis: glucose: 122, , PH 8.0, amylase 33  -Limited Echo: EF 55-60%, no obvious evidence of endocarditis. CANDIE:  Baseline Cr 1.3  De-compensated Alcoholic Cirrhosis with Ascites with Acute Hepatic Encephalopathy:  Dr. Karine Arias consulted  Ammonia -119  JENNIFER:  HTN:  HFpEF:  Hearing Impaired:  Pulmonary Nodules:  Obesity:  BMI:  32.66  Multi-Morbidity: per PMHx:Anxiety, alcoholic cirrhosis, GERD, HLD, HTN, portal HTN, pulmonary nodules, JENNIFER, choley     Plan:  Continue IV vancomycin per pharmacy dosing (sensi to Staph epi TRES is 1)  Trend CRP and Pct, ordered  Repeat BC until negative at 48 hours  Await repeat [facility] for   ID recommends EVA to evaluate for IE, DW Dr. Elvia Burnett    Ongoing Antimicrobial Therapy  Vancomycin -? Completed Antimicrobial Therapy  ? History:? Interval history noted. Chief complaint:  Staph spp in [facility]. Large right pleural effusion.    Denies n/v/d/f or untoward effects of antibiotics. States is feeling back to his \"normal self\"   Physical Exam:  Vital Signs: /74   Pulse 74   Temp 97.8 °F (36.6 °C) (Oral)   Resp 19   Ht 5' 8\" (1.727 m)   Wt 210 lb 1.6 oz (95.3 kg)   SpO2 100%   BMI 31.95 kg/m²     Gen: sitting on bedside eating, A&O x 4  Chest: no distress and CTA. Good air movement. Heart: NSR and no MRG. Abd: soft, non-distended, no tenderness, no hepatomegaly. Normoactive bowel sounds. Ext: no clubbing, cyanosis, or edema  Neuro: Mental status intact. CN 2-12 intact and no focal sensory or motor deficits     Radiologic / Imaging / TESTING  1/258/2023 CT Head WO Contrast:  Impression   No acute intracranial abnormality. 1/28/2023 XR Chest Portable:  Impression   Increasing opacity throughout the right hemithorax, which may reflect   increasing layering effusion and/or additional airspace disease. Masslike opacity in the right upper lung again demonstrated. 1/28/2023 CT Chest W Contrast:??  Impression   1. 3 separate rounded masslike densities identified within the right upper   and middle lobes and right lower lobe which are favored to reflect foci of   rounded atelectasis. Recommend interval follow-up to ensure resolution and   exclude the possibility of underlying pulmonary mass/pulmonary nodule. 2. Large right pleural effusion redemonstrated. 3. Redemonstration of cirrhotic morphology of the liver with large volume   intra-abdominal ascites. 4. Nonspecific mediastinal lymphadenopathy. 1/30/2023 IR US Guided Paracentesis:  FINDINGS:   Limited ultrasound of the abdomen demonstrates ascites with paracentesis   catheter within it. A total of 5004 mL milky/chylous appearing ascitic fluid   was removed. 37.5 g intravenous albumin utilized for procedure. No   complication suggested.             Labs:    Recent Results (from the past 24 hour(s))   POCT Glucose    Collection Time: 01/31/23 12:23 PM   Result Value Ref Range    POC Glucose 156 (H) 70 - 99 MG/DL   Echocardiogram limited    Collection Time: 01/31/23  2:59 PM   Result Value Ref Range    Left Ventricular Ejection Fraction 58     LVEF MODALITY ECHO    Ammonia    Collection Time: 02/01/23  6:32 AM   Result Value Ref Range    Ammonia 171 (H) 16 - 60 UMOL/L   Magnesium    Collection Time: 02/01/23  6:32 AM   Result Value Ref Range    Magnesium 1.9 1.8 - 2.4 mg/dl   Basic Metabolic Panel    Collection Time: 02/01/23  6:32 AM   Result Value Ref Range    Sodium 134 (L) 135 - 145 MMOL/L    Potassium 4.2 3.5 - 5.1 MMOL/L    Chloride 99 99 - 110 mMol/L    CO2 24 21 - 32 MMOL/L    Anion Gap 11 4 - 16    BUN 29 (H) 6 - 23 MG/DL    Creatinine 1.6 (H) 0.9 - 1.3 MG/DL    Est, Glom Filt Rate 45 (L) >60 mL/min/1.73m2    Glucose 150 (H) 70 - 99 MG/DL    Calcium 9.0 8.3 - 10.6 MG/DL   Troponin    Collection Time: 02/01/23  6:32 AM   Result Value Ref Range    Troponin T 0.041 (H) <0.01 NG/ML   Lactate Dehydrogenase    Collection Time: 02/01/23  6:32 AM   Result Value Ref Range     120 - 246 IU/L   C-Reactive Protein    Collection Time: 02/01/23  6:32 AM   Result Value Ref Range    CRP High Sensitivity 35.3 (H) <5.0 mg/L   Procalcitonin    Collection Time: 02/01/23  6:32 AM   Result Value Ref Range    Procalcitonin 0.290    POCT Glucose    Collection Time: 02/01/23  9:22 AM   Result Value Ref Range    POC Glucose 119 (H) 70 - 99 MG/DL     CULTURE results: Invalid input(s): BLOOD CULTURE,  URINE CULTURE, SURGICAL CULTURE    Diagnosis:  Patient Active Problem List   Diagnosis    Shortness of breath    History of colonic polyps    Ascites due to alcoholic cirrhosis (HCC)    Mixed hyperlipidemia    Hypertension    History of alcohol abuse    Gastroesophageal reflux disease    Anxiety    Pulmonary nodules    Hyperglycemia    Acquired hypothyroidism    Class 3 severe obesity with body mass index (BMI) of 40.0 to 44.9 in [facility])    Bilateral hearing loss    Increased ammonia level Portal hypertension (HCC)    SOB (shortness of breath)    Decompensation of cirrhosis of liver (HCC)    Secondary esophageal varices without bleeding (HCC)    Gastric polyps    Hearing loss    Septicemia (HCC)    Acute metabolic encephalopathy    Recurrent right pleural effusion    Acute respiratory failure (HCC)    Acute kidney injury superimposed on chronic kidney disease (HCC)    Generalized weakness    Oropharyngeal dysphagia    Cellulitis of left lower extremity    Type 2 diabetes mellitus without complication, without long-term current use of insulin (HCC)    Thyroid disease    Type 2 diabetes mellitus with chronic kidney disease    Ex-smoker    Obesity (BMI 30-39. 9)    Recurrent pleural effusion on right    Pleural effusion    Hypoxia    AMS (altered mental status)    Sleep related hypoxia    Stage 3 chronic kidney disease (HCC)    Bacteremia due to methicillin resistant Staphylococcus epidermidis    Alcoholic cirrhosis (HCC)    Coagulase negative Staphylococcus bacteremia       Active Problems  Principal Problem:    Acute metabolic encephalopathy  Active Problems:    Alcoholic cirrhosis (HCC)    Coagulase negative Staphylococcus bacteremia  Resolved Problems:    * No resolved hospital problems. *    Electronically signed by: Electronically signed by Rodrick Felton.  MOHINDER Hall CNP on 2/1/2023 at 11:44 AM

## 2023-02-02 LAB
AMMONIA: 81 UMOL/L (ref 16–60)
ANION GAP SERPL CALCULATED.3IONS-SCNC: 10 MMOL/L (ref 4–16)
BUN SERPL-MCNC: 29 MG/DL (ref 6–23)
CALCIUM SERPL-MCNC: 9 MG/DL (ref 8.3–10.6)
CHLORIDE BLD-SCNC: 101 MMOL/L (ref 99–110)
CO2: 25 MMOL/L (ref 21–32)
CREAT SERPL-MCNC: 1.5 MG/DL (ref 0.9–1.3)
CRP SERPL HS-MCNC: 33.1 MG/L
CULTURE: ABNORMAL
CULTURE: ABNORMAL
DOSE AMOUNT: NORMAL
DOSE TIME: NORMAL
GFR SERPL CREATININE-BSD FRML MDRD: 48 ML/MIN/1.73M2
GLUCOSE SERPL-MCNC: 110 MG/DL (ref 70–99)
GRAM SMEAR: ABNORMAL
Lab: ABNORMAL
MAGNESIUM: 2.1 MG/DL (ref 1.8–2.4)
POTASSIUM SERPL-SCNC: 3.7 MMOL/L (ref 3.5–5.1)
SODIUM BLD-SCNC: 136 MMOL/L (ref 135–145)
SPECIMEN: ABNORMAL
VANCOMYCIN RANDOM: 20 UG/ML

## 2023-02-02 PROCEDURE — 82140 ASSAY OF AMMONIA: CPT

## 2023-02-02 PROCEDURE — 6370000000 HC RX 637 (ALT 250 FOR IP): Performed by: STUDENT IN AN ORGANIZED HEALTH CARE EDUCATION/TRAINING PROGRAM

## 2023-02-02 PROCEDURE — 80048 BASIC METABOLIC PNL TOTAL CA: CPT

## 2023-02-02 PROCEDURE — 6360000002 HC RX W HCPCS: Performed by: HOSPITALIST

## 2023-02-02 PROCEDURE — 99231 SBSQ HOSP IP/OBS SF/LOW 25: CPT | Performed by: NURSE PRACTITIONER

## 2023-02-02 PROCEDURE — 80202 ASSAY OF VANCOMYCIN: CPT

## 2023-02-02 PROCEDURE — 99232 SBSQ HOSP IP/OBS MODERATE 35: CPT | Performed by: INTERNAL MEDICINE

## 2023-02-02 PROCEDURE — 51798 US URINE CAPACITY MEASURE: CPT

## 2023-02-02 PROCEDURE — 83735 ASSAY OF MAGNESIUM: CPT

## 2023-02-02 PROCEDURE — 2580000003 HC RX 258: Performed by: PHYSICIAN ASSISTANT

## 2023-02-02 PROCEDURE — 6370000000 HC RX 637 (ALT 250 FOR IP): Performed by: PHYSICIAN ASSISTANT

## 2023-02-02 PROCEDURE — 93312 ECHO TRANSESOPHAGEAL: CPT | Performed by: INTERNAL MEDICINE

## 2023-02-02 PROCEDURE — 93312 ECHO TRANSESOPHAGEAL: CPT

## 2023-02-02 PROCEDURE — 7100000000 HC PACU RECOVERY - FIRST 15 MIN

## 2023-02-02 PROCEDURE — 93308 TTE F-UP OR LMTD: CPT

## 2023-02-02 PROCEDURE — 99223 1ST HOSP IP/OBS HIGH 75: CPT | Performed by: INTERNAL MEDICINE

## 2023-02-02 PROCEDURE — B24BZZ4 ULTRASONOGRAPHY OF HEART WITH AORTA, TRANSESOPHAGEAL: ICD-10-PCS | Performed by: INTERNAL MEDICINE

## 2023-02-02 PROCEDURE — 2580000003 HC RX 258: Performed by: HOSPITALIST

## 2023-02-02 PROCEDURE — 86140 C-REACTIVE PROTEIN: CPT

## 2023-02-02 PROCEDURE — 7100000001 HC PACU RECOVERY - ADDTL 15 MIN

## 2023-02-02 PROCEDURE — 36415 COLL VENOUS BLD VENIPUNCTURE: CPT

## 2023-02-02 PROCEDURE — 94761 N-INVAS EAR/PLS OXIMETRY MLT: CPT

## 2023-02-02 PROCEDURE — 6360000002 HC RX W HCPCS: Performed by: PHYSICIAN ASSISTANT

## 2023-02-02 PROCEDURE — 1200000000 HC SEMI PRIVATE

## 2023-02-02 RX ADMIN — PANTOPRAZOLE SODIUM 40 MG: 40 TABLET, DELAYED RELEASE ORAL at 06:14

## 2023-02-02 RX ADMIN — ENOXAPARIN SODIUM 40 MG: 100 INJECTION SUBCUTANEOUS at 20:21

## 2023-02-02 RX ADMIN — Medication 10 ML: at 09:49

## 2023-02-02 RX ADMIN — Medication 10 ML: at 20:22

## 2023-02-02 RX ADMIN — LACTULOSE 20 G: 10 SOLUTION ORAL at 09:49

## 2023-02-02 RX ADMIN — LACTULOSE 20 G: 10 SOLUTION ORAL at 13:30

## 2023-02-02 RX ADMIN — RIFAXIMIN 550 MG: 550 TABLET ORAL at 20:21

## 2023-02-02 RX ADMIN — LACTULOSE 20 G: 10 SOLUTION ORAL at 20:23

## 2023-02-02 RX ADMIN — LEVOTHYROXINE SODIUM 50 MCG: 0.05 TABLET ORAL at 06:14

## 2023-02-02 RX ADMIN — Medication 100 MG: at 09:49

## 2023-02-02 RX ADMIN — DULOXETINE HYDROCHLORIDE 60 MG: 30 CAPSULE, DELAYED RELEASE ORAL at 09:49

## 2023-02-02 RX ADMIN — RIFAXIMIN 550 MG: 550 TABLET ORAL at 09:48

## 2023-02-02 RX ADMIN — VANCOMYCIN HYDROCHLORIDE 1000 MG: 1 INJECTION, POWDER, LYOPHILIZED, FOR SOLUTION INTRAVENOUS at 13:32

## 2023-02-02 ASSESSMENT — PAIN SCALES - WONG BAKER
WONGBAKER_NUMERICALRESPONSE: 0

## 2023-02-02 ASSESSMENT — PAIN SCALES - GENERAL: PAINLEVEL_OUTOF10: 0

## 2023-02-02 NOTE — PROGRESS NOTES
V2.0  [facility] [place] Progress Note      Name:  Dolores Mg /Age/Sex: 1945  (78 y.o. male)   MRN & CSN:  [id number] & [id number] Encounter Date/Time: 2023 11:31 AM EST    Location:  -M PCP: Greer Townsend, [address] Day: 6    Assessment and Plan:   Dolores Mg is a 78 y.o. male who presents with encephalopathy      Plan:  Acute metabolic encephalopathy  -likely due to hepatic, and bacteremia. CT head: No acute process. NH3 119. On lactulose and rifaximin. UDS negative. -Mental status appears improved. -Increased NH3 171, repeat 81 which indicates initial read was likely a lab error. Will not increase lactulose as patient having multiple bowel movements and mentation does not appear to have worsened  Decompensated cirrhosis with recurrent ascites  -complicated by esophageal varices. Deemed not a candidate for TIPS or transplant biopsy around 8 months ago. Supposed to go back to [facility] for reevaluation. Last paracentesis . GI consult. On beta-blocker.  -Paracentesis : 5.4 L removed. Holding diuretics due to CANDIE. Will need paracentesis every 2 weeks. Staph epidermidis bacteremia likely secondary to peritonitis with recent history of gram-positive bacteremia  Possible endocarditis  -completed ABX prior to admission.    -Started vancomycin. Pharmacy to dose  -ID following. BCx : Coag negative staph. Bcx:  NGTD. Pleural fluid cultures with staph epidermidis. -TTE without vegetations, consulted cardiology for EVA (last EVA on  did not show any vegetations). Procalcitonin 0.290. CRP 35. Recurrent right hydrothorax  -last thoracentesis . CT chest: Large right pleural effusion.  -Right thoracentesis : 1.1 L removed. Pulmonology following. Elevated troponin  -EKG shows no ST changes, however quality is poor. No complaints of chest pain. Appears to be chronically elevated. Troponin 0.014.  - Repeat EKG: NSR. Troponin 0.074. Rise likely due to kidney injury. Chronic hypoxic respiratory failure  -on 2 L nasal cannula at home  3 masses in right lung  -seen on CT chest.  May be atelectasis. - Pulmonology following. Acute kidney injury on CKD 3  - Hold diuretics. Check UA. -Improved creatinine  Hypermagnesemia  - Resolved, continue to monitor. HTN  CKD 3  Hypothyroidism  History of alcohol abuse-no longer drinking    Diet Diet NPO    DVT Prophylaxis [x] Lovenox, []  Heparin, [] SCDs, [] Ambulation,  [] Eliquis, [] Xarelto  [] Coumadin   Code Status Full Code   Disposition From: Home  Expected Disposition: Home  Estimated Date of Discharge: 2/3  Patient requires continued admission due to bacteremia   Home O2 NC as per nurse     Subjective/Interval history:   Chief Complaint: Altered Mental Status     Mentation improved, is significantly [place] in the setting of Ménière's disease. Reports that he feels 3% back to his baseline but has much room for improvement. Wife in room, discussed EVA. No other questions or concerns at this time.     Review of Systems:    Negative unless mentioned above    Objective:   No intake or output data in the 24 hours ending 02/02/23 1138       Vitals:   BP (!) 116/58   Pulse 68   Temp 97.7 °F (36.5 °C) (Oral)   Resp 17   Ht 5' 8\" (1.727 m)   Wt 206 lb 7 oz (93.6 kg)   SpO2 98%   BMI 31.39 kg/m²     Physical Exam:     General: NAD  Eyes: no discharge  HENT: NCAT  Cardiovascular: RRR  Respiratory: good air exchange and breath sounds  Gastrointestinal: Soft, non tender, nondistended  Genitourinary: no suprapubic tenderness  Musculoskeletal: edema in LE  nontender  Skin: warm, dry, no gross lesions  Neuro: awake and alert, no gross deficits,   Psych: mood appears appropriate    Medications:   Medications:    vancomycin  1,000 mg IntraVENous Q24H    lactulose  20 g Oral Once    DULoxetine  60 mg Oral Daily    pantoprazole  40 mg Oral QAM AC    [Held by provider] furosemide  40 mg Oral Daily levothyroxine  50 mcg Oral Daily    nadolol  40 mg Oral Nightly    rifAXIMin  550 mg Oral BID    [Held by provider] spironolactone  100 mg Oral BID    thiamine  100 mg Oral Daily    sodium chloride flush  5-40 mL IntraVENous 2 times per day    enoxaparin  40 mg SubCUTAneous Nightly    lactulose  20 g Oral TID      Infusions:    sodium chloride Stopped (01/31/23 1237)     PRN Meds: sodium chloride flush, 10 mL, PRN  sodium chloride flush, 10 mL, PRN  ziprasidone, 10 mg, Q6H PRN  ipratropium-albuterol, 1 ampule, Q4H PRN  albuterol sulfate HFA, 1 puff, Q6H PRN  sodium chloride flush, 5-40 mL, PRN  sodium chloride, 500 mL, PRN  ondansetron, 4 mg, Q8H PRN   Or  ondansetron, 4 mg, Q6H PRN  polyethylene glycol, 17 g, Daily PRN  acetaminophen, 650 mg, Q6H PRN   Or  acetaminophen, 650 mg, Q6H PRN  labetalol, 10 mg, Q6H PRN      Labs      No results for input(s): WBC, HGB, HCT, PLT in the last 72 hours. Recent Labs     01/31/23 0434 02/01/23 0632 02/02/23 0429    134* 136   K 4.1 4.2 3.7   CL 98* 99 101   CO2 29 24 25   BUN 24* 29* 29*   CREATININE 1.6* 1.6* 1.5*     No results for input(s): AST, ALT, ALB, BILIDIR, BILITOT, ALKPHOS in the last 72 hours. No results for input(s): INR in the last 72 hours.     Recent Labs     01/31/23 0434 02/01/23 0632   TROPONINT 0.052* 0.041*     Lab Results   Component Value Date/Time    LABA1C 5.5 12/06/2022 04:17 PM     CALCIUM:  9.0/25 (02/02 0429)  Lab Results   Component Value Date/Time    MG 2.1 02/02/2023 04:29 AM           Electronically signed by Saad Abreu MD on 2/2/2023 at 11:38 AM

## 2023-02-02 NOTE — PROGRESS NOTES
[address] [facility]  consulted by Dr. Nuha Rai for monitoring and adjustment. Indication for treatment: Vancomycin indication: Bacteremia  Goal trough: 10-15 mcg/mL  AUC/TRES: 400-600    Risk Factors for MRSA Identified:   Received IV antibiotics within the past 90 days    Pertinent Laboratory Values:   Temp Readings from Last 3 Encounters:   02/02/23 97.7 °F (36.5 °C) (Oral)   01/24/23 97.8 °F (36.6 °C) (Oral)   01/11/23 98.2 °F (36.8 °C) (Oral)     No results for input(s): WBC, LACTATE in the last 72 hours. Recent Labs     01/31/23  0434 02/01/23  0632 02/02/23  0429   BUN 24* 29* 29*   CREATININE 1.6* 1.6* 1.5*       Estimated Creatinine Clearance: 47 mL/min (A) (based on SCr of 1.5 mg/dL (H)). No intake or output data in the 24 hours ending 02/02/23 1349      Pertinent Cultures:   Date    Source    Results  1/28   Blood    GPC, 4/4, showing Staph epi 4/4 and CoNS 1/4 1/29   Respiratory PCR  Negative  1/30   Pleural Fluid   NGTD    Vancomycin level:   TROUGH:  No results for input(s): VANCOTROUGH in the last 72 hours. RANDOM:    Recent Labs     01/31/23  0434 02/02/23  0429   VANCORANDOM 10.9 20.0         Assessment:  HPI: Dagoberto White is a 78 y.o. male who presents with encephalopathy. Patient was recently admitted at the beginning of the month and received IV antibiotics for bacteremia. Blood cultures obtained during this admission showing staph species in 4/4 and vancomycin initiated.   SCr, BUN, and urine output:   CANDIE on CKD3, baseline 1.2-1.3  Day(s) of therapy: 4  Vancomycin concentration:   1/31: 10.9, appropriate to re-dose  2/2 - 20.0, 14 hours post-dose      Plan:  Continue vancomycin 1000 mg IVPB q24h  Predicted trough: 16.7   Repeat random level in 2 days, monitor for accumulation 2/2 BMI  Pharmacy will continue to monitor patient and adjust therapy as indicated    Rogelio 3  2/4 @ 0600    Thank you for the consult,  Ricky Jorge [facility]  2/2/2023 1:49 PM

## 2023-02-02 NOTE — PROGRESS NOTES
V2.0  [facility] [place]      Name:  Devon Sosa DOB/Age/Sex: 1945  (78 y.o. male)   MRN & CSN:  [id number] & [id number] Encounter Date/Time: 2023 11:31 AM EST    Location:  [address] [place] [address] PCP: Mark Tang, [address] Day: 5    Assessment and Plan:   Devon Sosa is a 78 y.o. male who presents with encephalopathy      Plan:  Acute metabolic encephalopathy  -likely due to hepatic, and bacteremia. CT head: No acute process. NH3 119. On lactulose and rifaximin. UDS negative. -Mental status appears improved. -Increased NH3 171. Do not want to increase lactulose, because patient states he is having multiple bowel movements, and his mental status does not appear to have worsened. Repeat NH3 to see if this is a lab error. Decompensated cirrhosis with recurrent ascites  -complicated by esophageal varices. Deemed not a candidate for TIPS or transplant biopsy around 8 months ago. Supposed to go back to [address] [place] [address] for reevaluation. Last paracentesis . GI consult. On beta-blocker.  -Paracentesis : 5.4 L removed. Holding diuretics due to CANDIE. Will need paracentesis every 2 weeks. Staph bacteremia recent history of gram-positive bacteremia  -completed ABX prior to admission.    - Started vancomycin. Pharmacy to dose. Repeat blood cultures. -ID following. BCx : Coag negative staph. - Consult cardiology for EVA. Procalcitonin 0.290. CRP 35. TTE: No vegetations. Recurrent right hydrothorax  -last thoracentesis . CT chest: Large right pleural effusion.  -Right thoracentesis : 1.1 L removed. Pulmonology following. Elevated troponin  -EKG shows no ST changes, however quality is poor. No complaints of chest pain. Appears to be chronically elevated. Troponin 0.014.  - Repeat EKG: NSR. Troponin 0.074. Rise likely due to kidney injury.   Chronic hypoxic respiratory failure  -on 2 L nasal cannula at home  3 masses in right lung  -seen on CT chest. May be atelectasis. - Pulmonology following. Acute kidney injury on CKD 3  - Hold diuretics. Check UA. -Improved creatinine 1.6. Hypermagnesemia  - Magnesium 2.8. Monitor. HTN  CKD 3  Hypothyroidism  History of alcohol abuse-no longer drinking    Diet ADULT DIET; Regular; Low Sodium (2 gm)    DVT Prophylaxis [x] Lovenox, []  Heparin, [] SCDs, [] Ambulation,  [] Eliquis, [] Xarelto  [] Coumadin   Code Status Full Code   Disposition From: Home  Expected Disposition: Home  Estimated Date of Discharge: 2/3  Patient requires continued admission due to bacteremia   Home O2 NC as per nurse     Subjective/Interval history:   Chief Complaint: Altered Mental Status     He states he is having multiple bowel movements    Review of Systems:    Negative unless mentioned above    Objective:      Intake/Output Summary (Last 24 hours) at 2/1/2023 2313  Last data filed at 2/1/2023 1048  Gross per 24 hour   Intake 10 ml   Output --   Net 10 ml        Vitals:   /79   Pulse 70   Temp 97.6 °F (36.4 °C) (Oral)   Resp 16   Ht 5' 8\" (1.727 m)   Wt 210 lb 1.6 oz (95.3 kg)   SpO2 100%   BMI 31.95 kg/m²     Physical Exam:   General: NAD, sitting in chair  Eyes: no discharge  HENT: NCAT  Cardiovascular: RRR  Respiratory: good air exchange and breath sounds  Gastrointestinal: Soft, non tender, nondistended  Genitourinary: no suprapubic tenderness  Musculoskeletal: edema in LE  nontender  Skin: warm, dry, no gross lesions  Neuro: awake and alert, no gross deficits,   Psych: mood appears appropriate    Medications:   Medications:    vancomycin  1,000 mg IntraVENous Q24H    lactulose  20 g Oral Once    DULoxetine  60 mg Oral Daily    pantoprazole  40 mg Oral QAM AC    [Held by provider] furosemide  40 mg Oral Daily    levothyroxine  50 mcg Oral Daily    nadolol  40 mg Oral Nightly    rifAXIMin  550 mg Oral BID    [Held by provider] spironolactone  100 mg Oral BID    thiamine  100 mg Oral Daily    sodium chloride flush  5-40 mL IntraVENous 2 times per day    enoxaparin  40 mg SubCUTAneous Nightly    lactulose  20 g Oral TID      Infusions:    sodium chloride Stopped (01/31/23 1237)     PRN Meds: sodium chloride flush, 10 mL, PRN  sodium chloride flush, 10 mL, PRN  ziprasidone, 10 mg, Q6H PRN  ipratropium-albuterol, 1 ampule, Q4H PRN  albuterol sulfate HFA, 1 puff, Q6H PRN  sodium chloride flush, 5-40 mL, PRN  sodium chloride, 500 mL, PRN  ondansetron, 4 mg, Q8H PRN   Or  ondansetron, 4 mg, Q6H PRN  polyethylene glycol, 17 g, Daily PRN  acetaminophen, 650 mg, Q6H PRN   Or  acetaminophen, 650 mg, Q6H PRN  labetalol, 10 mg, Q6H PRN      Labs      Recent Labs     01/30/23  0535   WBC 6.8   HGB 11.2*   HCT 34.4*         Recent Labs     01/30/23  0535 01/31/23  0434 02/01/23  0632    135 134*   K 4.7 4.1 4.2    98* 99   CO2 30 29 24   PHOS 3.2  --   --    BUN 28* 24* 29*   CREATININE 1.7* 1.6* 1.6*     Recent Labs     01/30/23  0535   AST 36   ALT 22   BILITOT 0.6   ALKPHOS 94     No results for input(s): INR in the last 72 hours.     Recent Labs     01/30/23  0535 01/31/23  0434 02/01/23  0632   TROPONINT 0.074* 0.052* 0.041*     Lab Results   Component Value Date/Time    LABA1C 5.5 12/06/2022 04:17 PM     CALCIUM:  9.0/24 (02/01 2792)  Lab Results   Component Value Date/Time    MG 1.9 02/01/2023 06:32 AM           Electronically signed by Piedad Duque MD on 2/1/2023 at 11:13 PM

## 2023-02-02 NOTE — PROGRESS NOTES
Infectious Disease Progress Note  2023   Patient Name: Shani Garay : 1945   Impression  Staphylococcus epidermidis Bacteremia Secondary to Peritonitis:  Possible Endocarditis:  Afebrile, no leukocytosis  Pct has trended up slightly  CrCl 45  -BC  CoNS, 4 Staphylococcus epidermidis  -CT Chest W Contrast: 1. 3 separate rounded masslike densities identified within the right upper   and middle lobes and right lower lobe which are favored to reflect foci of   rounded atelectasis. Recommend interval follow-up to ensure resolution and   exclude the possibility of underlying pulmonary mass/pulmonary nodule. 2. Large right pleural effusion redemonstrated. 3. Redemonstration of cirrhotic morphology of the liver with large volume   intra-abdominal ascites. 4. Nonspecific mediastinal lymphadenopathy. Last EVA 2023: No evidence of IE  Dr. Nadya Maynard, pulmonology, onboard  -S/p IR guided paracentesis of 5004 cc milky/chylous appearing ascitic fluid: cultures-Staphylococcus epidermidis. Fluid analysis: glucose: 122, , PH 8.0, amylase 33  -Limited Echo: EF 55-60%, no obvious evidence of endocarditis. CANDIE:  Baseline Cr 1.3  De-compensated Alcoholic Cirrhosis with Ascites with Acute Hepatic Encephalopathy:  Dr. Campos consulted  Ammonia -119  JENNIFER:  HTN:  HFpEF:  Hearing Impaired:  Pulmonary Nodules:  Obesity:  BMI:  32.66  Multi-Morbidity: per PMHx:Anxiety, alcoholic cirrhosis, GERD, HLD, HTN, portal HTN, pulmonary nodules, JENNIFER, choley     Plan:  Continue IV vancomycin per pharmacy dosing (sensi to Staph epi TRES is 1)  Will plan, if EVA is negative for IE, will treat with IV vancomycin x 14 days from negative BC  Trend CRP and Pct, ordered  Reviewed pleural/paracentesis fluid culture, grew Staph epi  Await EVA results, final ABX recs dependent upon EVA    Ongoing Antimicrobial Therapy  Vancomycin -? Completed Antimicrobial Therapy  ? History:? Interval history noted.  Chief complaint:  Staph spp in [facility]. Large right pleural effusion. Denies n/v/d/f or untoward effects of antibiotics. States is feeling back to his \"normal self\"   Physical Exam:  Vital Signs: BP (!) 116/58   Pulse 68   Temp 97.7 °F (36.5 °C) (Oral)   Resp 17   Ht 5' 8\" (1.727 m)   Wt 206 lb 7 oz (93.6 kg)   SpO2 98%   BMI 31.39 kg/m²     Gen: remains A&O x 4  Chest: no distress and CTA. Good air movement. Heart: NSR and no MRG. Abd: soft, non-distended, no tenderness, no hepatomegaly. Normoactive bowel sounds. Ext: no clubbing, cyanosis, or edema  Neuro: Mental status intact. CN 2-12 intact and no focal sensory or motor deficits     Radiologic / Imaging / TESTING  1/258/2023 CT Head WO Contrast:  Impression   No acute intracranial abnormality. 1/28/2023 XR Chest Portable:  Impression   Increasing opacity throughout the right hemithorax, which may reflect   increasing layering effusion and/or additional airspace disease. Masslike opacity in the right upper lung again demonstrated. 1/28/2023 CT Chest W Contrast:??  Impression   1. 3 separate rounded masslike densities identified within the right upper   and middle lobes and right lower lobe which are favored to reflect foci of   rounded atelectasis. Recommend interval follow-up to ensure resolution and   exclude the possibility of underlying pulmonary mass/pulmonary nodule. 2. Large right pleural effusion redemonstrated. 3. Redemonstration of cirrhotic morphology of the liver with large volume   intra-abdominal ascites. 4. Nonspecific mediastinal lymphadenopathy. 1/30/2023 IR US Guided Paracentesis:  FINDINGS:   Limited ultrasound of the abdomen demonstrates ascites with paracentesis   catheter within it. A total of 5004 mL milky/chylous appearing ascitic fluid   was removed. 37.5 g intravenous albumin utilized for procedure. No   complication suggested.          2/2/2023 EVA:     Labs:    Recent Results (from the past 24 hour(s))   POCT Glucose    Collection Time: 02/01/23 12:29 PM   Result Value Ref Range    POC Glucose 120 (H) 70 - 99 MG/DL   POCT Glucose    Collection Time: 02/01/23  8:17 PM   Result Value Ref Range    POC Glucose 119 (H) 70 - 99 MG/DL   Ammonia    Collection Time: 02/02/23  4:29 AM   Result Value Ref Range    Ammonia 81 (H) 16 - 60 UMOL/L   Magnesium    Collection Time: 02/02/23  4:29 AM   Result Value Ref Range    Magnesium 2.1 1.8 - 2.4 mg/dl   Basic Metabolic Panel    Collection Time: 02/02/23  4:29 AM   Result Value Ref Range    Sodium 136 135 - 145 MMOL/L    Potassium 3.7 3.5 - 5.1 MMOL/L    Chloride 101 99 - 110 mMol/L    CO2 25 21 - 32 MMOL/L    Anion Gap 10 4 - 16    BUN 29 (H) 6 - 23 MG/DL    Creatinine 1.5 (H) 0.9 - 1.3 MG/DL    Est, Glom Filt Rate 48 (L) >60 mL/min/1.73m2    Glucose 110 (H) 70 - 99 MG/DL    Calcium 9.0 8.3 - 10.6 MG/DL   Vancomycin Level, Random    Collection Time: 02/02/23  4:29 AM   Result Value Ref Range    Vancomycin Rm 20.0 UG/ML    DOSE AMOUNT DOSE AMT.  GIVEN - `     DOSE TIME DOSE TIME GIVEN - `    C-Reactive Protein    Collection Time: 02/02/23  4:29 AM   Result Value Ref Range    CRP High Sensitivity 33.1 (H) <5.0 mg/L     CULTURE results: Invalid input(s): BLOOD CULTURE,  URINE CULTURE, SURGICAL CULTURE    Diagnosis:  Patient Active Problem List   Diagnosis    Shortness of breath    History of colonic polyps    Ascites due to alcoholic cirrhosis (HCC)    Mixed hyperlipidemia    Hypertension    History of alcohol abuse    Gastroesophageal reflux disease    Anxiety    Pulmonary nodules    Hyperglycemia    Acquired hypothyroidism    Class 3 severe obesity with body mass index (BMI) of 40.0 to 44.9 in adult [facility])    Bilateral hearing loss    Increased ammonia level    Portal hypertension (HCC)    SOB (shortness of breath)    Decompensation of cirrhosis of liver (HCC)    Secondary esophageal varices without bleeding (HCC)    Gastric polyps    Hearing loss    Septicemia ([facility]    Acute metabolic encephalopathy    Recurrent right pleural effusion    Acute respiratory failure (HCC)    Acute kidney injury superimposed on chronic kidney disease (HCC)    Generalized weakness    Oropharyngeal dysphagia    Cellulitis of left lower extremity    Type 2 diabetes mellitus without complication, without long-term current use of insulin (HCC)    Thyroid disease    Type 2 diabetes mellitus with chronic kidney disease    Ex-smoker    Obesity (BMI 30-39. 9)    Recurrent pleural effusion on right    Pleural effusion    Hypoxia    AMS (altered mental status)    Sleep related hypoxia    Stage 3 chronic kidney disease (HCC)    Bacteremia due to methicillin resistant Staphylococcus epidermidis    Alcoholic cirrhosis (HCC)    Coagulase negative Staphylococcus bacteremia       Active Problems  Principal Problem:    Acute metabolic encephalopathy  Active Problems:    Alcoholic cirrhosis (HCC)    Coagulase negative Staphylococcus bacteremia  Resolved Problems:    * No resolved hospital problems. *    Electronically signed by: Electronically signed by MOHINDER Akers CNP on 2/2/2023 at 11:27 AM

## 2023-02-02 NOTE — CONSULTS
Nephrology Service Consultation      [address] [address]  Phone: [phone]  Office Hours: 8:30AM - 4:30PM  Monday - Friday        MEDICAL DECISION MAKING and Recommendations     CKD3  AScites and pleural effusions from decompensated cirrhosis  Encephalopathy, hepatic  Staph epi bacteremia  Lung nodules    Suggest:  -Cr stable at 1.5  -As mentioned on my previous note, I do not recommend much higher doses of diuretics that what he is on at home. I would not want him to get volume depleted and results in CANDIE and then HD needs.   It would be better to get the ascites drain on a schedule, at this point  -Please avoid nephrotoxins  -Awaiting EVA  -OK TO hold the diuretics for now            Patient Active Problem List    Diagnosis Date Noted    Alcoholic cirrhosis ([address] 97/63/4051    Coagulase negative Staphylococcus bacteremia 01/31/2023    Stage 3 chronic kidney disease ([address] 01/09/2023    Bacteremia due to methicillin resistant Staphylococcus epidermidis 01/09/2023    Sleep related hypoxia 12/06/2022    AMS (altered mental status) 11/12/2022    Hypoxia 11/07/2022    Pleural effusion 10/04/2022    Recurrent pleural effusion on right 09/09/2022    Ex-smoker 08/15/2022    Obesity (BMI 30-39.9) 08/15/2022    Type 2 diabetes mellitus with chronic kidney disease 07/12/2022    Thyroid disease 06/21/2022    Cellulitis of left lower extremity 06/08/2022    Type 2 diabetes mellitus without complication, without long-term current use of insulin ([address] 06/08/2022    Generalized weakness     Oropharyngeal dysphagia     Acute kidney injury superimposed on chronic kidney disease ([address]     Acute respiratory failure ([address] 05/04/2022    Recurrent right pleural effusion 05/02/2022    Septicemia ([address] 08/20/9591    Acute metabolic encephalopathy 52/81/7385    Hearing loss 03/11/2022    Decompensation of cirrhosis of liver ([address]     Secondary esophageal varices without bleeding ([address]     Gastric polyps     SOB (shortness of breath) 09/13/2021    Portal hypertension ([address] 08/30/2021    Bilateral hearing loss 05/07/2021    Increased ammonia level 05/07/2021    Class 3 severe obesity with body mass index (BMI) of 40.0 to 44.9 in adult [facility]) 09/21/2020    Hyperglycemia 12/04/2019    Acquired hypothyroidism 12/04/2019    Pulmonary nodules 09/09/2019    Ascites due to alcoholic cirrhosis ([address] 27/77/3123    Mixed hyperlipidemia 08/08/2019    Hypertension 08/08/2019    History of alcohol abuse 08/08/2019    Gastroesophageal reflux disease 08/08/2019    Anxiety 08/08/2019    Shortness of breath 05/26/2019    History of colonic polyps 01/15/2019         Patient:  Keren March  MRN: [id number]  Consulting physician:  Indira Santana MD  Reason for Consult: pt previously seen by me  PCP: Jose Ramon Estrada MD    HISTORY OF PRESENT ILLNESS:   The patient is a 78 y.o. male with decompensated cirrhosis, not a candidate for liver txp, presented due to confusion  Renal consult for cr 1.5 and diuretic mgmt  He has a hx of recurrent ascites and hydrothorax  He is on room air today  No leg edema    REVIEW OF SYSTEMS:  14 point ROS is Negative. See positive ROS per HPI    Past Medical History:        Diagnosis Date    Anxiety     Cirrhosis, alcoholic ([address]     Diabetes mellitus ([address]     GERD (gastroesophageal reflux disease) 2000    GERD (gastroesophageal reflux disease)     Hyperlipidemia     Hypertension     Portal hypertension (HCC)     Pulmonary nodules     Sleep apnea     SOB (shortness of breath)     Thyroid disease        Past Surgical History:        Procedure Laterality Date    APPENDECTOMY      CHOLECYSTECTOMY      COLONOSCOPY      ENDOSCOPY, COLON, DIAGNOSTIC      FOOT SURGERY      needle removed,not sure which foot, per wife.     TONSILLECTOMY      UPPER GASTROINTESTINAL ENDOSCOPY N/A 1/19/2022    EGD BIOPSY performed by Chapincito Pena MD at [facility] ENDOSCOPY    VASECTOMY         Medications:   Prior to Admission medications    Medication Sig Start Date End Date Taking? Authorizing Provider   rifAXIMin (XIFAXAN) 550 MG tablet Take 1 tablet by mouth 2 times daily 1/11/23   Anitha Peña MD   thiamine 100 MG tablet Take 1 tablet by mouth daily 1/12/23   Anitha Peña MD   furosemide (LASIX) 40 MG tablet Take 1 tablet by mouth daily 11/22/22   Shaun Childress MD   levothyroxine (SYNTHROID) 50 MCG tablet Take 1 tablet by mouth Daily 10/14/22 4/12/23  Shaun Childress MD   albuterol sulfate HFA (PROVENTIL;VENTOLIN;PROAIR) 108 (90 Base) MCG/ACT inhaler INHALE 2 PUFFS BY MOUTH 4 TIMES DAILY AS NEEDED FOR WHEEZING 10/14/22   Shaun Childress MD   DULoxetine (CYMBALTA) 60 MG extended release capsule TAKE 1 CAPSULE BY MOUTH ONCE DAILY 10/14/22 8/18/23  Shaun Childress MD   betamethasone valerate (VALISONE) 0.1 % lotion Apply topically 2 times daily prn scalp rash 10/14/22   Shaun Childress MD   spironolactone (ALDACTONE) 100 MG tablet Take 100 mg by mouth 2 times daily    Historical Provider, MD   nadolol (CORGARD) 40 MG tablet Take 1 tablet by mouth in the morning. Patient taking differently: Take 40 mg by mouth nightly 7/20/22 1/23/23  Duong Marc, APRN - CNP   lactulose [facility]) 10 GM/15ML solution Take 30 mLs by mouth 3 times daily Hold if having diarrhea 3/21/22   Lorraine Gupta MD   Multiple Vitamin (MULTI VITAMIN MENS PO) Take 1 tablet by mouth daily    Historical Provider, MD   ESOMEPRAZOLE MAGNESIUM PO Take 20 mg by mouth daily OTC    Historical Provider, MD   Dickens-3 1000 MG CAPS Take 2,000 mg by mouth daily    Historical Provider, MD        Allergies:  Lisinopril, Zetia [ezetimibe], Atorvastatin, Codeine, Hydrochlorothiazide, Hydroxyzine, Lexapro [escitalopram oxalate], and Pravastatin    Social History:   TOBACCO:   reports that he quit smoking about 47 years ago. His smoking use included cigarettes. He has a 14.00 pack-year smoking history.  He has never used smokeless tobacco.  ETOH:   reports that he does not currently use alcohol. OCCUPATION:      Family History:       Problem Relation Age of Onset    Hypertension Brother     Diabetes Other      Physical Exam:    Vitals: BP (!) 142/87   Pulse 70   Temp 98.5 °F (36.9 °C) (Oral)   Resp 15   Ht 5' 8\" (1.727 m)   Wt 206 lb 7 oz (93.6 kg)   SpO2 98%   BMI 31.39 kg/m²   General appearance: in no acute distress, appears stated age  Skin: Skin color, texture, turgor normal. No rashes or lesions  HEENT: normocephalic, atraumatic  Neck: supple, trachea midline  Lungs: clear to auscultation bilaterally, breathing comfortably on room air  Heart[de-identified] regular rate and rhythm, S1, S2 normal,  Abdomen: soft, non-tender; bowel sounds normal; no masses,   Extremities: extremities normal, atraumatic, no cyanosis or edema  Neurologic: Mental status: alert, oriented, interactive, following commands//hard of hearing  Psychiatric: mood and affect appropriate     CBC: No results for input(s): WBC, HGB, PLT in the last 72 hours.   BMP:    Recent Labs     01/31/23  0434 02/01/23  0632 02/02/23  0429    134* 136   K 4.1 4.2 3.7   CL 98* 99 101   CO2 29 24 25   BUN 24* 29* 29*   CREATININE 1.6* 1.6* 1.5*   GLUCOSE 111* 150* 110*            Electronically signed by Gayatri Schroeder DO on 2/2/2023 at 9:08 AM    [facility]  MD Cassie Drew DO [place] [address] [place] [address] [address]  PHONE: [phone]  FAX: [phone]

## 2023-02-02 NOTE — CONSULTS
CARDIOLOGY CONSULT NOTE   Reason for consultation: Bacteremia for EVA    Referring physician:  No admitting provider for patient encounter. Primary care physician: Aida Pop MD      Dear   Thanks for the consult. History of present illness:Boby is a 78 y. 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 Cardio consult is called for bacteremia and for EVA, because of encephalopathy all information obtained after review of medical record and discussion with the staff  Chief Complaint   Patient presents with    Altered Mental Status     Blood pressure, cholesterol, blood glucose and weight are well controlled. Past medical history:    has a past medical history of Anxiety, Cirrhosis, alcoholic ([address] Diabetes mellitus ([address] GERD (gastroesophageal reflux disease), GERD (gastroesophageal reflux disease), Hyperlipidemia, Hypertension, Portal hypertension ([address] Pulmonary nodules, Sleep apnea, SOB (shortness of breath), and Thyroid disease. Past surgical history:   has a past surgical history that includes Cholecystectomy; Vasectomy; Colonoscopy; Endoscopy, colon, diagnostic; Foot surgery; Upper gastrointestinal endoscopy (N/A, 1/19/2022); Tonsillectomy; and Appendectomy. Social History:   reports that he quit smoking about 47 years ago. His smoking use included cigarettes. He has a 14.00 pack-year smoking history. He has never used smokeless tobacco. He reports that he does not currently use alcohol. He reports that he does not use drugs.   Family history:   no family history of CAD, STROKE of DM    Allergies   Allergen Reactions    Lisinopril Swelling     Tongue swelling      Zetia [Ezetimibe] Swelling     Facial swelling    Atorvastatin     Codeine Other (See Comments)     Blood pressure drops    Hydrochlorothiazide     Hydroxyzine      Fatigue and depressed    Lexapro [Escitalopram Oxalate]      Increased depression    Pravastatin        vancomycin (VANCOCIN) 1,000 mg in sodium chloride 0.9 % 250 mL IVPB ([id number]), Q24H  sodium chloride flush 0.9 % injection 10 mL, PRN  sodium chloride flush 0.9 % injection 10 mL, PRN  ziprasidone (GEODON) injection 10 mg, Q6H PRN  ipratropium-albuterol (DUONEB) nebulizer solution 1 ampule, Q4H PRN  lactulose (CHRONULAC) 10 GM/15ML solution 20 g, Once  albuterol sulfate HFA (PROVENTIL;VENTOLIN;PROAIR) 108 (90 Base) MCG/ACT inhaler 1 puff, Q6H PRN  DULoxetine (CYMBALTA) extended release capsule 60 mg, Daily  pantoprazole (PROTONIX) tablet 40 mg, QAM AC  [Held by provider] furosemide (LASIX) tablet 40 mg, Daily  levothyroxine (SYNTHROID) tablet 50 mcg, Daily  nadolol (CORGARD) tablet 40 mg, Nightly  rifAXIMin (XIFAXAN) tablet 550 mg, BID  [Held by provider] spironolactone (ALDACTONE) tablet 100 mg, BID  thiamine tablet 100 mg, Daily  sodium chloride flush 0.9 % injection 5-40 mL, 2 times per day  sodium chloride flush 0.9 % injection 5-40 mL, PRN  0.9 % sodium chloride infusion, PRN  enoxaparin (LOVENOX) injection 40 mg, Nightly  ondansetron (ZOFRAN-ODT) disintegrating tablet 4 mg, Q8H PRN   Or  ondansetron (ZOFRAN) injection 4 mg, Q6H PRN  polyethylene glycol (GLYCOLAX) packet 17 g, Daily PRN  acetaminophen (TYLENOL) tablet 650 mg, Q6H PRN   Or  acetaminophen (TYLENOL) suppository 650 mg, Q6H PRN  labetalol (NORMODYNE;TRANDATE) injection 10 mg, Q6H PRN  lactulose (CHRONULAC) 10 GM/15ML solution 20 g, TID      Current Facility-Administered Medications   Medication Dose Route Frequency Provider Last Rate Last Admin    vancomycin (VANCOCIN) 1,000 mg in sodium chloride 0.9 % 250 mL IVPB ([id number])  1,000 mg IntraVENous Q24H Norma Liang MD   Stopped at 02/01/23 1558    sodium chloride flush 0.9 % injection 10 mL  10 mL IntraVENous PRN Shemar Madrigal,         sodium chloride flush 0.9 % injection 10 mL  10 mL IntraVENous PRN Randall Watts DO        ziprasidone (GEODON) injection 10 mg  10 mg IntraMUSCular Q6H PRN Estela Chao MD        ipratropium-albuterol (DUONEB) nebulizer solution 1 ampule  1 ampule Inhalation Q4H PRN Elin Grimaldo MD        lactulose (CHRONULAC) 10 GM/15ML solution 20 g  20 g Oral Once Richard Aceves MD        albuterol sulfate HFA (PROVENTIL;VENTOLIN;PROAIR) 108 (90 Base) MCG/ACT inhaler 1 puff  1 puff Inhalation Q6H PRN Raudel Jacobs PA-C        DULoxetine (CYMBALTA) extended release capsule 60 mg  60 mg Oral Daily LYNDSEY [employer]der[employer]   60 mg at 02/02/23 0949    pantoprazole (PROTONIX) tablet 40 mg  40 mg Oral QAM AC LYNDSEY [employer]   40 mg at 02/02/23 3558    [Held by provider] furosemide (LASIX) tablet 40 mg  40 mg Oral Daily Raudel Jacobs PA-C        levothyroxine (SYNTHROID) tablet 50 mcg  50 mcg Oral Daily LYNDSEY [employer]   50 mcg at 02/02/23 0885    nadolol (CORGARD) tablet 40 mg  40 mg Oral Nightly LYNDSEY [employer]   40 mg at 02/01/23 2018    rifAXIMin (XIFAXAN) tablet 550 mg  550 mg Oral BID Raudel Jacobs PA-C   550 mg at 02/02/23 1175    [Held by provider] spironolactone (ALDACTONE) tablet 100 mg  100 mg Oral BID LYNDSEY [employer]der[employer]   100 mg at 01/29/23 1725    thiamine tablet 100 mg  100 mg Oral Daily LYNDSEY [employer]   100 mg at 02/02/23 0348    sodium chloride flush 0.9 % injection 5-40 mL  5-40 mL IntraVENous 2 times per day LYNDSEY [employer]   10 mL at 02/02/23 0949    sodium chloride flush 0.9 % injection 5-40 mL  5-40 mL IntraVENous PRN Raudel Jacobs PA-C        0.9 % sodium chloride infusion  500 mL IntraVENous PRN LYNDSEY [employer]derC   Stopped at 01/31/23 1237    enoxaparin (LOVENOX) injection 40 mg  40 mg SubCUTAneous Nightly LYNDSEY [employer]der[employer]   40 mg at 02/01/23 2018    ondansetron (ZOFRAN-ODT) disintegrating tablet 4 mg  4 mg Oral Q8H PRN Raudel Jacobs PA-C        Or    ondansetron [facility]) injection 4 mg  4 mg IntraVENous Q6H PRN Raudel Jacobs PA-C        polyethylene glycol (GLYCOLAX) packet 17 g  17 g Oral Daily PRN Raudel Jacobs, RAMIRO        acetaminophen (TYLENOL) tablet 650 mg  650 mg Oral Q6H PRN Praful Ayers PA-C        Or    acetaminophen (TYLENOL) suppository 650 mg  650 mg Rectal Q6H PRN Praful Ayers PA-C        labetalol (NORMODYNE;TRANDATE) injection 10 mg  10 mg IntraVENous Q6H PRN Praful Ayers PA-C        lactulose (CHRONULAC) 10 GM/15ML solution 20 g  20 g Oral TID Ko Nicholas MD   20 g at 02/02/23 0043     Review of Systems:   Constitutional: No Fever or Weight Loss   Eyes: No Decreased Vision  ENT: No Headaches, Hearing Loss or Vertigo  Cardiovascular: No chest pain, dyspnea on exertion, palpitations or loss of consciousness  Respiratory: No cough or wheezing    Gastrointestinal: No abdominal pain, appetite loss, blood in stools, constipation, diarrhea or heartburn  Genitourinary: No dysuria, trouble voiding, or hematuria  Musculoskeletal:  No gait disturbance, weakness or joint complaints  Integumentary: No rash or pruritis  Neurological: No TIA or stroke symptoms  Psychiatric: No anxiety or depression  Endocrine: No malaise, fatigue or temperature intolerance  Hematologic/Lymphatic: No bleeding problems, blood clots or swollen lymph nodes  Allergic/Immunologic: No nasal congestion or hives  All systems negative except as marked. Physical Examination:    Vitals:    02/02/23 0945   BP: (!) 116/58   Pulse: 68   Resp: 17   Temp: 97.7 °F (36.5 °C)   SpO2: 98%      Wt Readings from Last 3 Encounters:   02/02/23 206 lb 7 oz (93.6 kg)   01/23/23 240 lb (108.9 kg)   01/11/23 227 lb (103 kg)     Body mass index is 31.39 kg/m². General Appearance:  No distress, conversant    Constitutional:  Well developed, Well nourished, No acute distress, Non-toxic appearance.    HENT:  Normocephalic, Atraumatic, Bilateral external ears normal, Oropharynx moist, No oral exudates, Nose normal. Neck- Normal range of motion, No tenderness, Supple, No stridor,no apical-carotid delay, no carotid bruit  Eyes:  PERRL, EOMI, Conjunctiva normal, No discharge. Respiratory:  Normal breath sounds, No respiratory distress, No wheezing, No chest tenderness. ,no use of accessory muscles, diaphragm movement is normal  Cardiovascular: (PMI) apex non displaced,no lifts no thrills, no s3,no s4, Normal heart rate, Normal rhythm, No murmurs, No rubs, No gallops. Carotid arteries pulse and amplitude are normal no bruit, no abdominal bruit noted ( normal abdominal aorta ausculation), femoral arteries pulse and amplitude are normal no bruit, pedal pulses are normal  GI:  Bowel sounds normal, Soft, No tenderness, No masses, No pulsatile masses, no hepatosplenomegally, no bruits  : External genitalia appear normal, No masses or lesions. No discharge. No CVA tenderness. Musculoskeletal:  Intact distal pulses, No edema, No tenderness, No cyanosis, No clubbing. Good range of motion in all major joints. No tenderness to palpation or major deformities noted. Back- No tenderness. Integument:  Warm, Dry, No erythema, No rash. Skin: no rash, no ulcers  Lymphatic:  No lymphadenopathy noted. Neurologic:  Alert & oriented x 3, Normal motor function, Normal sensory function, No focal deficits noted. Psychiatric:  Affect normal, Judgment normal, Mood normal.   Lab Review   No results for input(s): WBC, HGB, HCT, PLT in the last 72 hours. Recent Labs     02/02/23  0429      K 3.7      CO2 25   BUN 29*   CREATININE 1.5*     No results for input(s): AST, ALT, ALB, BILIDIR, BILITOT, ALKPHOS in the last 72 hours. No results for input(s): TROPONINI in the last 72 hours. No results found for: BNP  Lab Results   Component Value Date    INR 1.01 01/28/2023    PROTIME 13.0 01/28/2023         EKG:nsr    Chest Xray:    ECHO: LVEF 55%  Labs, echo, meds reviewed  Assessment: 78 y. o.year old with PMH of  has a past medical history of Anxiety, Cirrhosis, alcoholic ([address] Diabetes mellitus ([address] GERD (gastroesophageal reflux disease), GERD (gastroesophageal reflux disease), Hyperlipidemia, Hypertension, Portal hypertension ([facility] Pulmonary nodules, Sleep apnea, SOB (shortness of breath), and Thyroid disease. Recommendations:    Bacteremia, will proceed with EVA consent will be obtained by his wife  Alcoholic liver disease related encephalopathy  Acute as per distress resolved  Pretension controlled  Kidneys DISEASE stable Cardizem still  Health maintenance: exerise and diet  All labs, medications and tests reviewed, continue all other medications of all above medical condition listed as is.          Sobia Cobos MD, 2/2/2023 11:20 AM

## 2023-02-02 NOTE — PROGRESS NOTES
Pulmonary and Critical Care  Progress Note      VITALS:  BP (!) 116/58   Pulse 68   Temp 97.7 °F (36.5 °C) (Oral)   Resp 17   Ht 5' 8\" (1.727 m)   Wt 206 lb 7 oz (93.6 kg)   SpO2 98%   BMI 31.39 kg/m²     Subjective:   CHIEF COMPLAINT :SOB     HPI:                The patient is a 78 y.o. male is lying in the bed. He is still little confused. He is not in acute resp distress    Objective:   PHYSICAL EXAM:    LUNGS:Decreased air entry right side  Abd-distended, BS+,NT  Ext- 1+ pedal edema  CVS-s1s2 no murmurs      DATA:    CBC:  No results for input(s): WBC, RBC, HGB, HCT, PLT, MCV, MCH, MCHC, RDW, NRBC, SEGSPCT, BANDSPCT in the last 72 hours. BMP:  Recent Labs     01/31/23  0434 02/01/23  0632 02/02/23  0429    134* 136   K 4.1 4.2 3.7   CL 98* 99 101   CO2 29 24 25   BUN 24* 29* 29*   CREATININE 1.6* 1.6* 1.5*   CALCIUM 9.1 9.0 9.0   GLUCOSE 111* 150* 110*      ABG:  No results for input(s): PH, PO2ART, [id number], HCO3, BEART, O2SAT in the last 72 hours.   BNP  No results found for: BNP   D-Dimer:  Lab Results   Component Value Date    DDIMER 1606 (H) 09/13/2021      Radiology: None      Assessment/Plan     Patient Active Problem List    Diagnosis Date Noted    Alcoholic cirrhosis ([facility] 68/01/8876     Priority: Medium    Coagulase negative Staphylococcus bacteremia 01/31/2023     Priority: Medium    Stage 3 chronic kidney disease ([facility] 01/09/2023     Priority: Medium    Bacteremia due to methicillin resistant Staphylococcus epidermidis 01/09/2023     Priority: Medium    Sleep related hypoxia 12/06/2022     Priority: Medium    AMS (altered mental status) 11/12/2022     Priority: Medium    Hypoxia 11/07/2022     Priority: Medium    Pleural effusion 10/04/2022     Priority: Medium    Recurrent pleural effusion on right 09/09/2022     Priority: Medium    Ex-smoker 08/15/2022     Priority: Medium    Obesity (BMI 30-39.9) 08/15/2022     Priority: Medium    Type 2 diabetes mellitus with chronic kidney disease 07/12/2022     Priority: Medium    Thyroid disease 06/21/2022     Priority: Medium    Cellulitis of left lower extremity 06/08/2022     Priority: Medium    Type 2 diabetes mellitus without complication, without long-term current use of insulin ([facility] 06/08/2022     Priority: Medium    Generalized weakness      Priority: Medium    Oropharyngeal dysphagia      Priority: Medium    Acute kidney injury superimposed on chronic kidney disease ([address]      Priority: Medium    Acute respiratory failure ([address] 05/04/2022     Priority: Medium    Recurrent right pleural effusion 05/02/2022     Priority: Medium    Septicemia ([address] 54/65/4178    Acute metabolic encephalopathy 16/28/3603    Hearing loss 03/11/2022    Decompensation of cirrhosis of liver ([address]     Secondary esophageal varices without bleeding (HCC)     Gastric polyps     SOB (shortness of breath) 09/13/2021    Portal hypertension ([address] 08/30/2021    Bilateral hearing loss 05/07/2021    Increased ammonia level 05/07/2021    Class 3 severe obesity with body mass index (BMI) of 40.0 to 44.9 in adult ([address] 09/21/2020    Hyperglycemia 12/04/2019    Acquired hypothyroidism 12/04/2019    Pulmonary nodules 09/09/2019    Ascites due to alcoholic cirrhosis ([address] 66/18/5176    Mixed hyperlipidemia 08/08/2019    Hypertension 08/08/2019    History of alcohol abuse 08/08/2019    Gastroesophageal reflux disease 08/08/2019    Anxiety 08/08/2019    Shortness of breath 05/26/2019    History of colonic polyps 01/15/2019   CANDIE on CKD  Alcoholic Cirrhosis with Portal HTN  Recurrent ascites  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

## 2023-02-03 ENCOUNTER — TELEPHONE (OUTPATIENT)
Dept: FAMILY MEDICINE CLINIC | Age: 78
End: 2023-02-03

## 2023-02-03 VITALS
HEART RATE: 74 BPM | OXYGEN SATURATION: 98 % | TEMPERATURE: 98.2 F | WEIGHT: 211.4 LBS | DIASTOLIC BLOOD PRESSURE: 122 MMHG | RESPIRATION RATE: 18 BRPM | BODY MASS INDEX: 32.04 KG/M2 | HEIGHT: 68 IN | SYSTOLIC BLOOD PRESSURE: 139 MMHG

## 2023-02-03 LAB
ANION GAP SERPL CALCULATED.3IONS-SCNC: 10 MMOL/L (ref 4–16)
BASOPHILS ABSOLUTE: 0.1 K/CU MM
BASOPHILS RELATIVE PERCENT: 1.7 % (ref 0–1)
BUN SERPL-MCNC: 31 MG/DL (ref 6–23)
CALCIUM SERPL-MCNC: 9.3 MG/DL (ref 8.3–10.6)
CHLORIDE BLD-SCNC: 101 MMOL/L (ref 99–110)
CO2: 27 MMOL/L (ref 21–32)
CREAT SERPL-MCNC: 1.8 MG/DL (ref 0.9–1.3)
CRP SERPL HS-MCNC: 34.1 MG/L
DIFFERENTIAL TYPE: ABNORMAL
EOSINOPHILS ABSOLUTE: 1.1 K/CU MM
EOSINOPHILS RELATIVE PERCENT: 14.8 % (ref 0–3)
GFR SERPL CREATININE-BSD FRML MDRD: 39 ML/MIN/1.73M2
GLUCOSE SERPL-MCNC: 91 MG/DL (ref 70–99)
HCT VFR BLD CALC: 35.8 % (ref 42–52)
HEMOGLOBIN: 11.7 GM/DL (ref 13.5–18)
IMMATURE NEUTROPHIL %: 0.3 % (ref 0–0.43)
LYMPHOCYTES ABSOLUTE: 1 K/CU MM
LYMPHOCYTES RELATIVE PERCENT: 13.5 % (ref 24–44)
MCH RBC QN AUTO: 31.8 PG (ref 27–31)
MCHC RBC AUTO-ENTMCNC: 32.7 % (ref 32–36)
MCV RBC AUTO: 97.3 FL (ref 78–100)
MONOCYTES ABSOLUTE: 1.4 K/CU MM
MONOCYTES RELATIVE PERCENT: 18.8 % (ref 0–4)
NUCLEATED RBC %: 0 %
PDW BLD-RTO: 14.4 % (ref 11.7–14.9)
PLATELET # BLD: 229 K/CU MM (ref 140–440)
PMV BLD AUTO: 10.3 FL (ref 7.5–11.1)
POTASSIUM SERPL-SCNC: 4.1 MMOL/L (ref 3.5–5.1)
RBC # BLD: 3.68 M/CU MM (ref 4.6–6.2)
SEGMENTED NEUTROPHILS ABSOLUTE COUNT: 3.7 K/CU MM
SEGMENTED NEUTROPHILS RELATIVE PERCENT: 50.9 % (ref 36–66)
SODIUM BLD-SCNC: 138 MMOL/L (ref 135–145)
TOTAL IMMATURE NEUTOROPHIL: 0.02 K/CU MM
TOTAL NUCLEATED RBC: 0 K/CU MM
WBC # BLD: 7.2 K/CU MM (ref 4–10.5)

## 2023-02-03 PROCEDURE — C1751 CATH, INF, PER/CENT/MIDLINE: HCPCS

## 2023-02-03 PROCEDURE — 2580000003 HC RX 258: Performed by: PHYSICIAN ASSISTANT

## 2023-02-03 PROCEDURE — 94761 N-INVAS EAR/PLS OXIMETRY MLT: CPT

## 2023-02-03 PROCEDURE — 76937 US GUIDE VASCULAR ACCESS: CPT

## 2023-02-03 PROCEDURE — 85025 COMPLETE CBC W/AUTO DIFF WBC: CPT

## 2023-02-03 PROCEDURE — 02HV33Z INSERTION OF INFUSION DEVICE INTO SUPERIOR VENA CAVA, PERCUTANEOUS APPROACH: ICD-10-PCS | Performed by: STUDENT IN AN ORGANIZED HEALTH CARE EDUCATION/TRAINING PROGRAM

## 2023-02-03 PROCEDURE — 99232 SBSQ HOSP IP/OBS MODERATE 35: CPT | Performed by: INTERNAL MEDICINE

## 2023-02-03 PROCEDURE — 94660 CPAP INITIATION&MGMT: CPT

## 2023-02-03 PROCEDURE — 86140 C-REACTIVE PROTEIN: CPT

## 2023-02-03 PROCEDURE — 6360000002 HC RX W HCPCS: Performed by: HOSPITALIST

## 2023-02-03 PROCEDURE — 36569 INSJ PICC 5 YR+ W/O IMAGING: CPT

## 2023-02-03 PROCEDURE — 6370000000 HC RX 637 (ALT 250 FOR IP): Performed by: STUDENT IN AN ORGANIZED HEALTH CARE EDUCATION/TRAINING PROGRAM

## 2023-02-03 PROCEDURE — 36415 COLL VENOUS BLD VENIPUNCTURE: CPT

## 2023-02-03 PROCEDURE — 6370000000 HC RX 637 (ALT 250 FOR IP): Performed by: PHYSICIAN ASSISTANT

## 2023-02-03 PROCEDURE — 80048 BASIC METABOLIC PNL TOTAL CA: CPT

## 2023-02-03 PROCEDURE — 99232 SBSQ HOSP IP/OBS MODERATE 35: CPT | Performed by: NURSE PRACTITIONER

## 2023-02-03 PROCEDURE — 2580000003 HC RX 258: Performed by: HOSPITALIST

## 2023-02-03 RX ORDER — SODIUM CHLORIDE 0.9 % (FLUSH) 0.9 %
5-40 SYRINGE (ML) INJECTION PRN
Status: DISCONTINUED | OUTPATIENT
Start: 2023-02-03 | End: 2023-02-03 | Stop reason: HOSPADM

## 2023-02-03 RX ORDER — SODIUM CHLORIDE 9 MG/ML
INJECTION, SOLUTION INTRAVENOUS PRN
Status: DISCONTINUED | OUTPATIENT
Start: 2023-02-03 | End: 2023-02-03 | Stop reason: HOSPADM

## 2023-02-03 RX ORDER — SODIUM CHLORIDE 0.9 % (FLUSH) 0.9 %
5-40 SYRINGE (ML) INJECTION EVERY 12 HOURS SCHEDULED
Status: DISCONTINUED | OUTPATIENT
Start: 2023-02-03 | End: 2023-02-03 | Stop reason: HOSPADM

## 2023-02-03 RX ORDER — LIDOCAINE HYDROCHLORIDE 10 MG/ML
5 INJECTION, SOLUTION EPIDURAL; INFILTRATION; INTRACAUDAL; PERINEURAL ONCE
Status: DISCONTINUED | OUTPATIENT
Start: 2023-02-03 | End: 2023-02-03 | Stop reason: HOSPADM

## 2023-02-03 RX ADMIN — Medication 100 MG: at 08:53

## 2023-02-03 RX ADMIN — LEVOTHYROXINE SODIUM 50 MCG: 0.05 TABLET ORAL at 06:16

## 2023-02-03 RX ADMIN — Medication 10 ML: at 08:55

## 2023-02-03 RX ADMIN — LACTULOSE 20 G: 10 SOLUTION ORAL at 08:55

## 2023-02-03 RX ADMIN — VANCOMYCIN HYDROCHLORIDE 1000 MG: 1 INJECTION, POWDER, LYOPHILIZED, FOR SOLUTION INTRAVENOUS at 13:14

## 2023-02-03 RX ADMIN — PANTOPRAZOLE SODIUM 40 MG: 40 TABLET, DELAYED RELEASE ORAL at 06:16

## 2023-02-03 RX ADMIN — DULOXETINE HYDROCHLORIDE 60 MG: 30 CAPSULE, DELAYED RELEASE ORAL at 08:53

## 2023-02-03 RX ADMIN — RIFAXIMIN 550 MG: 550 TABLET ORAL at 08:53

## 2023-02-03 NOTE — CONSULTS
PICC Consult complete. Indication for line: Vanco 6 weeks. Education regarding PICC insertion discussed and risks and benefits assessed with Patient. Patient verbalized understanding. Consent signed by patient. Time out done @ 1205. PICC inserted in BLAKE Basilic Vessel without difficulty per protocol. Placement verified via VPSG[employer] monitoring system. Good blood return and flushes easily on both ports. Patient tolerated well. Education pamphlets left in chart  Ultrasound photos of vein diameter and doppler signature placed in chart. Please consult IV/PICC team if patient's needs change. PICC OK to use.

## 2023-02-03 NOTE — CARE COORDINATION
CM in to see Pt to follow up on discharge planning. Plan remains home with spouse. Pt discharging on IV Vancomycin x6 weeks. PS to Dr. Brittney Turner to update. HC order, printed/signed scripts requested. [facility], [facility]. order faxed to [phone]. Referral given to Yasmin Bains. [facility]. [facility] information, HC order faxed to [phone]    Yasmin Bains requested Pt have his Vancomycin dose today with start of care tomorrow 2/4. Dr. Brittney Turner updated. 2:08 PM   CM verified with [facility], antibiotics to be delivered to Pt home by 7pm tonight. [facility] updated of Pt discharge. Pt wife Yasmin Correia updated. Pt RN Mikayla updated.      Discharging RN- Please fax discharge summary to [phone]

## 2023-02-03 NOTE — PROGRESS NOTES
02/03/23 0200   NIV Type   $NIV $Daily Charge   NIV Started/Stopped On   Equipment Type Autopap   Mode CPAP   Mask Type Full face mask   Mask Size Medium   Bonnet size Medium   Settings/Measurements   CPAP/EPAP 5.4 cmH2O   Resp 15   FiO2  21 %   Comfort Level Good   Using Accessory Muscles No   SpO2 96   Patient's Home Machine No

## 2023-02-03 NOTE — PLAN OF CARE
Problem: Discharge Planning  Goal: Discharge to home or other facility with appropriate resources  2/3/2023 1151 by Ginny Toussaint RN  Outcome: Completed  2/3/2023 0356 by Jame Rodriguez RN  Outcome: Progressing     Problem: Pain  Goal: Verbalizes/displays adequate comfort level or baseline comfort level  2/3/2023 1151 by Ginny Toussaint RN  Outcome: Completed  2/3/2023 0356 by Jame Rodriguez RN  Outcome: Progressing     Problem: Skin/Tissue Integrity  Goal: Absence of new skin breakdown  Description: 1. Monitor for areas of redness and/or skin breakdown  2. Assess vascular access sites hourly  3. Every 4-6 hours minimum:  Change oxygen saturation probe site  4. Every 4-6 hours:  If on nasal continuous positive airway pressure, respiratory therapy assess nares and determine need for appliance change or resting period.   2/3/2023 1151 by Ginny Toussaint RN  Outcome: Completed  2/3/2023 0356 by Jame Rodriguez RN  Outcome: Progressing     Problem: Safety - Adult  Goal: Free from fall injury  2/3/2023 1151 by Ginny Toussaint RN  Outcome: Completed  2/3/2023 0356 by Jame Rodriguez RN  Outcome: Progressing

## 2023-02-03 NOTE — PROGRESS NOTES
Infectious Disease Progress Note  2/3/2023   Patient Name: Cindy Leiva : 1945   Impression  Staphylococcus epidermidis Bacteremia Secondary to Peritonitis:  Native TV Endocarditis:  Afebrile, no leukocytosis  Pct has trended up slightly  CrCl 45  -BC  CoNS, 4/ Staphylococcus epidermidis  -BC 0/2 NGTD  -CT Chest W Contrast: 1. 3 separate rounded masslike densities identified within the right upper   and middle lobes and right lower lobe which are favored to reflect foci of   rounded atelectasis. Recommend interval follow-up to ensure resolution and   exclude the possibility of underlying pulmonary mass/pulmonary nodule. 2. Large right pleural effusion redemonstrated. 3. Redemonstration of cirrhotic morphology of the liver with large volume   intra-abdominal ascites. 4. Nonspecific mediastinal lymphadenopathy. Last EVA 2023: No evidence of IE  Dr. Keven Mendenhall, pulmonology, onboard  -S/p IR guided paracentesis of 5004 cc milky/chylous appearing ascitic fluid: cultures-Staphylococcus epidermidis. Fluid analysis: glucose: 122, , PH 8.0, amylase 33  -Limited Echo: EF 55-60%, no obvious evidence of endocarditis. 2023-EVA: mobile echodensity noted on the tricuspid valve leaflet. Full report below.    CANDIE:  Baseline Cr 1.3  De-compensated Alcoholic Cirrhosis with Ascites with Acute Hepatic Encephalopathy:  Dr. Anjali Guerrero consulted  Ammonia -119  JENNIFER:  HTN:  HFpEF:  Hearing Impaired:  Pulmonary Nodules:  Obesity:  BMI:  32.66  Multi-Morbidity: per PMHx:Anxiety, alcoholic cirrhosis, GERD, HLD, HTN, portal HTN, pulmonary nodules, JENNIFER, choley     Plan:  Continue IV vancomycin 1 gm q24h (sensi to Staph epi TRES is 1) x 6 weeks (end date 3/13/2023)  Ordered PICC line for IV ABX access  Follow up with ID in 1 week please  Weekly labs drawn on Monday during the course of treatment  CBC with differential, CMP, ESR, CRP,Vancomycin Trough  Fax results to Attn: Shilpi Hernandez Infectious Diseases Staff  # [phone]   OK from ID standpoint to DC after PICC line placement and when ready    Ongoing Antimicrobial Therapy  Vancomycin 1/30-? Completed Antimicrobial Therapy  ? History:? Interval history noted. Chief complaint:  Staph spp in [facility]. Large right pleural effusion. Denies n/v/d/f or untoward effects of antibiotics. Physical Exam:  Vital Signs: /65   Pulse 73   Temp 97.9 °F (36.6 °C) (Axillary)   Resp 15   Ht 5' 8\" (1.727 m)   Wt 211 lb 6.4 oz (95.9 kg)   SpO2 98%   BMI 32.14 kg/m²     Gen: alert and oriented  Chest: no distress and CTA. Good air movement. Heart: NSR and no MRG. Abd: soft, non-distended, no tenderness, no hepatomegaly. Normoactive bowel sounds. Ext: no clubbing, cyanosis, or edema  Neuro: Mental status intact. CN 2-12 intact and no focal sensory or motor deficits     Radiologic / Imaging / TESTING  1/258/2023 CT Head WO Contrast:  Impression   No acute intracranial abnormality. 1/28/2023 XR Chest Portable:  Impression   Increasing opacity throughout the right hemithorax, which may reflect   increasing layering effusion and/or additional airspace disease. Masslike opacity in the right upper lung again demonstrated. 1/28/2023 CT Chest W Contrast:??  Impression   1. 3 separate rounded masslike densities identified within the right upper   and middle lobes and right lower lobe which are favored to reflect foci of   rounded atelectasis. Recommend interval follow-up to ensure resolution and   exclude the possibility of underlying pulmonary mass/pulmonary nodule. 2. Large right pleural effusion redemonstrated. 3. Redemonstration of cirrhotic morphology of the liver with large volume   intra-abdominal ascites. 4. Nonspecific mediastinal lymphadenopathy. 1/30/2023 IR US Guided Paracentesis:  FINDINGS:   Limited ultrasound of the abdomen demonstrates ascites with paracentesis   catheter within it.   A total of 5004 mL milky/chylous appearing ascitic fluid   was removed. 37.5 g intravenous albumin utilized for procedure. No   complication suggested. 2/2/2023 EVA:   Left Atrium   No evidence of thrombus within the left atrial appendage. Right Atrium   Essentially normal right atrium size. Right Ventricle   Essentially normal right ventricle. Aortic Valve   Structurally normal aortic valve. Mitral Valve   Mild mitral regurgitation. Tricuspid Valve   There is a mobile echodensity noted on the tricuspid valve leaflet. Pulmonic Valve   The pulmonic valve was not well visualized. Pericardial Effusion   There is no evidence of any pericardial effusion. Pleural Effusion   No evidence of pleural effusion. Labs:    No results found for this or any previous visit (from the past 24 hour(s)).     CULTURE results: Invalid input(s): BLOOD CULTURE,  URINE CULTURE, SURGICAL CULTURE    Diagnosis:  Patient Active Problem List   Diagnosis    Shortness of breath    History of colonic polyps    Ascites due to alcoholic cirrhosis (HCC)    Mixed hyperlipidemia    Hypertension    History of alcohol abuse    Gastroesophageal reflux disease    Anxiety    Pulmonary nodules    Hyperglycemia    Acquired hypothyroidism    Class 3 severe obesity with body mass index (BMI) of 40.0 to 44.9 in adult [facility])    Bilateral hearing loss    Increased ammonia level    Portal hypertension (HCC)    SOB (shortness of breath)    Decompensation of cirrhosis of liver (HCC)    Secondary esophageal varices without bleeding (HCC)    Gastric polyps    Hearing loss    Septicemia (HCC)    Acute metabolic encephalopathy    Recurrent right pleural effusion    Acute respiratory failure (HCC)    Acute kidney injury superimposed on chronic kidney disease (HCC)    Generalized weakness    Oropharyngeal dysphagia    Cellulitis of left lower extremity    Type 2 diabetes mellitus without complication, without long-term current use of insulin ([facility]    Thyroid disease    Type 2 diabetes mellitus with chronic kidney disease    Ex-smoker    Obesity (BMI 30-39. 9)    Recurrent pleural effusion on right    Pleural effusion    Hypoxia    AMS (altered mental status)    Sleep related hypoxia    Stage 3 chronic kidney disease (HCC)    Bacteremia due to methicillin resistant Staphylococcus epidermidis    Alcoholic cirrhosis (HCC)    Coagulase negative Staphylococcus bacteremia       Active Problems  Principal Problem:    Acute metabolic encephalopathy  Active Problems:    Alcoholic cirrhosis (HCC)    Coagulase negative Staphylococcus bacteremia  Resolved Problems:    * No resolved hospital problems. *    Electronically signed by: Electronically signed by Mark Gleason.  MOHINDER Hall CNP on 2/3/2023 at 8:50 AM

## 2023-02-03 NOTE — PROGRESS NOTES
[address] [facility]  consulted by Dr. Madai South for monitoring and adjustment. Indication for treatment: Vancomycin indication: Bacteremia  Goal trough: 10-15 mcg/mL  AUC/TRES: 400-600    Risk Factors for MRSA Identified:   Received IV antibiotics within the past 90 days    Pertinent Laboratory Values:   Temp Readings from Last 3 Encounters:   02/03/23 98.2 °F (36.8 °C) (Oral)   01/24/23 97.8 °F (36.6 °C) (Oral)   01/11/23 98.2 °F (36.8 °C) (Oral)     Recent Labs     02/03/23  0857   WBC 7.2       Recent Labs     02/01/23  0632 02/02/23  0429 02/03/23  0857   BUN 29* 29* 31*   CREATININE 1.6* 1.5* 1.8*       Estimated Creatinine Clearance: 40 mL/min (A) (based on SCr of 1.8 mg/dL (H)). Intake/Output Summary (Last 24 hours) at 2/3/2023 1323  Last data filed at 2/3/2023 0850  Gross per 24 hour   Intake 240 ml   Output --   Net 240 ml         Pertinent Cultures:   Date    Source    Results  1/28   Blood    GPC, 4/4, showing Staph epi 4/4 and CoNS 1/4 1/29   Respiratory PCR  Negative  1/30   Pleural Fluid   Staph epi, no fungal growth  1/31   Blood    NGTD    Vancomycin level:   TROUGH:  No results for input(s): VANCOTROUGH in the last 72 hours. RANDOM:    Recent Labs     02/02/23  0429   VANCORANDOM 20.0         Assessment:  HPI: Ulices Ruiz is a 78 y.o. male who presents with encephalopathy. Patient was recently admitted at the beginning of the month and received IV antibiotics for bacteremia. Blood cultures obtained during this admission showing staph species (Staph epi) 4/4 and vancomycin initiated. Repeat blood cultures are NGTD. 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 ID is following.   SCr, BUN, and urine output:   CANDIE on CKD3 but trends stable in range 1.5-1.7 during admission, slight increase to 1.8 today; baseline SCr range 1.2-1.3  Continue to monitor renal trends closely  Day(s) of therapy: 5  Duration: 6 weeks  Vancomycin concentration:   1/31 - 10.9, appropriate to re-dose  2/02 - 20.0, 14 hours post-dose,  (therapeutic) (1000 mg q24h)      Plan:  Continue vancomycin 1000 mg IVPB q24h  Predicted trough: 16.7,   Repeat next level and renal labs tomorrow AM  Pharmacy will continue to monitor patient and adjust therapy as indicated    VANCOMYCIN CONCENTRATION SCHEDULED FOR 2/4 @ 0600    Thank you for the consult,  NADEGE [facility], PharmD  2/3/2023 1:23 PM

## 2023-02-03 NOTE — PROGRESS NOTES
Physician Progress Note      PATIENT:               Douglas Castellano  CSN #:                  [id number]  :                       1945  ADMIT DATE:       2023 2:03 PM  [address] DATE:  RESPONDING  PROVIDER #:        Vick Lan MD          QUERY TEXT:    Hospitalists,    Pt admitted with decompensated cirrhosis and has sepsis documented by the   Pulmonologist.  If possible, please document in progress notes and discharge   summary:    The medical record reflects the following:  Risk Factors: cirrhosis  Clinical Indicators: staph bacteremia, sepsis documented by Pulmonology, CANDIE,   both metabolic and hepatic encphalopathy documented  Treatment: labs, imaging, IV atb    Thank you,  Amy Zimmerman RN CDS  [phone]  Options provided:  -- Sepsis confirmed present on admission  -- Sepsis confirmed not present on admission  -- Sepsis ruled out  -- Other - I will add my own diagnosis  -- Disagree - Not applicable / Not valid  -- Disagree - Clinically unable to determine / Unknown  -- Refer to Clinical Documentation Reviewer    PROVIDER RESPONSE TEXT:    The diagnosis of sepsis was confirmed as present on admission.     Query created by: Shane Curtis on 2023 1:30 PM      Electronically signed by:  Vick Lan MD 2023 10:24 PM

## 2023-02-03 NOTE — DISCHARGE INSTRUCTIONS
Continue your home meds like you were taking before. You will also need antibiotics (vancomycin) until 3/13/23, it will be once a day through your IV. Follow up with the infectious disease doctor (Dr. Denver Casillas in 1 week). You will need to get paracentesis and thoracentesis every two weeks and even sooner if necessary. The last time you got these done was 1/30 so plan to schedule this two weeks from then or sooner if you start to feel symptoms of fluid building up in your belly (like pain or uncomfortable fullness) or your lungs (worsening shortness of breath). Follow up with your primary care physician as soon as possible. Continue to follow with the lung and GI doctor.

## 2023-02-03 NOTE — PROGRESS NOTES
Pulmonary and Critical Care  Progress Note      VITALS:  BP (!) 139/122   Pulse 74   Temp 98.2 °F (36.8 °C) (Oral)   Resp 18   Ht 5' 8\" (1.727 m)   Wt 211 lb 6.4 oz (95.9 kg)   SpO2 98%   BMI 32.14 kg/m²     Subjective:   CHIEF COMPLAINT :SOB     HPI:                The patient is a 78 y.o. male is lying in the bed. He is less confused. He is not in acute resp distress    Objective:   PHYSICAL EXAM:    LUNGS:Decreased air entry right side  Abd-distended, BS+,NT  Ext- 1+ pedal edema  CVS-s1s2 no murmurs      DATA:    CBC:  No results for input(s): WBC, RBC, HGB, HCT, PLT, MCV, MCH, MCHC, RDW, NRBC, SEGSPCT, BANDSPCT in the last 72 hours. BMP:  Recent Labs     02/01/23  0632 02/02/23  0429   * 136   K 4.2 3.7   CL 99 101   CO2 24 25   BUN 29* 29*   CREATININE 1.6* 1.5*   CALCIUM 9.0 9.0   GLUCOSE 150* 110*      ABG:  No results for input(s): PH, PO2ART, ZEO8LZE, HCO3, BEART, O2SAT in the last 72 hours.   BNP  No results found for: BNP   D-Dimer:  Lab Results   Component Value Date    DDIMER 1606 (H) 09/13/2021      Radiology: None      Assessment/Plan     Patient Active Problem List    Diagnosis Date Noted    Alcoholic cirrhosis ([facility] 21/42/4744     Priority: Medium    Coagulase negative Staphylococcus bacteremia 01/31/2023     Priority: Medium    Stage 3 chronic kidney disease ([facility] 01/09/2023     Priority: Medium    Bacteremia due to methicillin resistant Staphylococcus epidermidis 01/09/2023     Priority: Medium    Sleep related hypoxia 12/06/2022     Priority: Medium    AMS (altered mental status) 11/12/2022     Priority: Medium    Hypoxia 11/07/2022     Priority: Medium    Pleural effusion 10/04/2022     Priority: Medium    Recurrent pleural effusion on right 09/09/2022     Priority: Medium    Ex-smoker 08/15/2022     Priority: Medium    Obesity (BMI 30-39.9) 08/15/2022     Priority: Medium    Type 2 diabetes mellitus with chronic kidney disease 07/12/2022     Priority: Medium    Thyroid disease 06/21/2022     Priority: Medium    Cellulitis of left lower extremity 06/08/2022     Priority: Medium    Type 2 diabetes mellitus without complication, without long-term current use of insulin ([facility] 06/08/2022     Priority: Medium    Generalized weakness      Priority: Medium    Oropharyngeal dysphagia      Priority: Medium    Acute kidney injury superimposed on chronic kidney disease ([facility]      Priority: Medium    Acute respiratory failure ([address] 05/04/2022     Priority: Medium    Recurrent right pleural effusion 05/02/2022     Priority: Medium    Septicemia ([facility] 78/74/2111    Acute metabolic encephalopathy 30/34/7035    Hearing loss 03/11/2022    Decompensation of cirrhosis of liver ([facility]     Secondary esophageal varices without bleeding (HCC)     Gastric polyps     SOB (shortness of breath) 09/13/2021    Portal hypertension ([facility] 08/30/2021    Bilateral hearing loss 05/07/2021    Increased ammonia level 05/07/2021    Class 3 severe obesity with body mass index (BMI) of 40.0 to 44.9 in adult ([facility] 09/21/2020    Hyperglycemia 12/04/2019    Acquired hypothyroidism 12/04/2019    Pulmonary nodules 09/09/2019    Ascites due to alcoholic cirrhosis ([facility] 94/57/1190    Mixed hyperlipidemia 08/08/2019    Hypertension 08/08/2019    History of alcohol abuse 08/08/2019    Gastroesophageal reflux disease 08/08/2019    Anxiety 08/08/2019    Shortness of breath 05/26/2019    History of colonic polyps 01/15/2019     CANDIE on CKD  Alcoholic Cirrhosis with Portal HTN  Recurrent ascites  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

## 2023-02-03 NOTE — PROGRESS NOTES
Physical Therapy  Attempted to see pt this morning for PT treatment. Pt declined therapy today stating he was going home soon and felt he did not need any therapy services prior to d/c.

## 2023-02-03 NOTE — TELEPHONE ENCOUNTER
Spoke with Neto Lainez, [address]. Gave verbal for skilled nursing, PT and OT. She voiced understanding.

## 2023-02-03 NOTE — PROGRESS NOTES
-Doing well on room air  -Cr 1.5  -As mentioned on my previous notes, I do not recommend much higher doses of diuretics than what he is on at home. I would not want him to get volume depleted and results in CANDIE and then HD needs. It would be better to get the ascites drain on a schedule, at this point.     Thank you    Patient Active Problem List    Diagnosis Date Noted    Alcoholic cirrhosis ([address] 17/26/4917    Coagulase negative Staphylococcus bacteremia 01/31/2023    Stage 3 chronic kidney disease ([address] 01/09/2023    Bacteremia due to methicillin resistant Staphylococcus epidermidis 01/09/2023    Sleep related hypoxia 12/06/2022    AMS (altered mental status) 11/12/2022    Hypoxia 11/07/2022    Pleural effusion 10/04/2022    Recurrent pleural effusion on right 09/09/2022    Ex-smoker 08/15/2022    Obesity (BMI 30-39.9) 08/15/2022    Type 2 diabetes mellitus with chronic kidney disease 07/12/2022    Thyroid disease 06/21/2022    Cellulitis of left lower extremity 06/08/2022    Type 2 diabetes mellitus without complication, without long-term current use of insulin ([address] 06/08/2022    Generalized weakness     Oropharyngeal dysphagia     Acute kidney injury superimposed on chronic kidney disease ([address]     Acute respiratory failure ([address] 05/04/2022    Recurrent right pleural effusion 05/02/2022    Septicemia ([address] 95/50/5337    Acute metabolic encephalopathy 34/62/8104    Hearing loss 03/11/2022    Decompensation of cirrhosis of liver ([address]     Secondary esophageal varices without bleeding ([address]     Gastric polyps     SOB (shortness of breath) 09/13/2021    Portal hypertension ([address] 08/30/2021    Bilateral hearing loss 05/07/2021    Increased ammonia level 05/07/2021    Class 3 severe obesity with body mass index (BMI) of 40.0 to 44.9 in adult [facility]) 09/21/2020    Hyperglycemia 12/04/2019    Acquired hypothyroidism 12/04/2019    Pulmonary nodules 09/09/2019    Ascites due to alcoholic cirrhosis ([address] 44/29/3840 Mixed hyperlipidemia 08/08/2019    Hypertension 08/08/2019    History of alcohol abuse 08/08/2019    Gastroesophageal reflux disease 08/08/2019    Anxiety 08/08/2019    Shortness of breath 05/26/2019    History of colonic polyps 01/15/2019

## 2023-02-03 NOTE — PLAN OF CARE
Problem: Discharge Planning  Goal: Discharge to home or other facility with appropriate resources  2/3/2023 0356 by Sb Porras RN  Outcome: Progressing  2/2/2023 1855 by Darcy Matson RN  Outcome: Progressing     Problem: Pain  Goal: Verbalizes/displays adequate comfort level or baseline comfort level  2/3/2023 0356 by Sb Porras RN  Outcome: Progressing  2/2/2023 1855 by Darcy Matson RN  Outcome: Progressing     Problem: Skin/Tissue Integrity  Goal: Absence of new skin breakdown  Description: 1. Monitor for areas of redness and/or skin breakdown  2. Assess vascular access sites hourly  3. Every 4-6 hours minimum:  Change oxygen saturation probe site  4. Every 4-6 hours:  If on nasal continuous positive airway pressure, respiratory therapy assess nares and determine need for appliance change or resting period.   2/3/2023 0356 by Sb Porras RN  Outcome: Progressing  2/2/2023 1855 by Darcy Matson RN  Outcome: Progressing     Problem: Safety - Adult  Goal: Free from fall injury  2/3/2023 0356 by Sb Porras RN  Outcome: Progressing  2/2/2023 1855 by Dacry Matson RN  Outcome: Progressing

## 2023-02-05 LAB
CULTURE: NORMAL
CULTURE: NORMAL
Lab: NORMAL
Lab: NORMAL
SPECIMEN: NORMAL
SPECIMEN: NORMAL

## 2023-02-06 ENCOUNTER — TELEPHONE (OUTPATIENT)
Dept: INFECTIOUS DISEASES | Age: 78
End: 2023-02-06

## 2023-02-06 ENCOUNTER — CARE COORDINATION (OUTPATIENT)
Dept: CASE MANAGEMENT | Age: 78
End: 2023-02-06

## 2023-02-06 ENCOUNTER — HOSPITAL ENCOUNTER (OUTPATIENT)
Age: 78
Setting detail: SPECIMEN
Discharge: HOME | End: 2023-02-06
Payer: COMMERCIAL

## 2023-02-06 ENCOUNTER — OFFICE VISIT (OUTPATIENT)
Dept: GASTROENTEROLOGY | Age: 78
End: 2023-02-06
Payer: COMMERCIAL

## 2023-02-06 VITALS
DIASTOLIC BLOOD PRESSURE: 74 MMHG | OXYGEN SATURATION: 98 % | HEART RATE: 88 BPM | SYSTOLIC BLOOD PRESSURE: 138 MMHG | TEMPERATURE: 97.9 F | BODY MASS INDEX: 34.71 KG/M2 | WEIGHT: 229 LBS | HEIGHT: 68 IN

## 2023-02-06 DIAGNOSIS — K70.31 ALCOHOLIC CIRRHOSIS OF LIVER WITH ASCITES (HCC): Primary | ICD-10-CM

## 2023-02-06 LAB
BASOPHILS ABSOLUTE: 0.1 K/CU MM
BASOPHILS RELATIVE PERCENT: 1.2 % (ref 0–1)
CRP SERPL HS-MCNC: 46.8 MG/L
DIFFERENTIAL TYPE: ABNORMAL
DOSE AMOUNT: ABNORMAL
DOSE TIME: ABNORMAL
EOSINOPHILS ABSOLUTE: 0.8 K/CU MM
EOSINOPHILS RELATIVE PERCENT: 10.1 % (ref 0–3)
ERYTHROCYTE SEDIMENTATION RATE: 62 MM/HR (ref 0–20)
HCT VFR BLD CALC: 33.8 % (ref 42–52)
HEMOGLOBIN: 11 GM/DL (ref 13.5–18)
IMMATURE NEUTROPHIL %: 0.5 % (ref 0–0.43)
LYMPHOCYTES ABSOLUTE: 0.8 K/CU MM
LYMPHOCYTES RELATIVE PERCENT: 9.7 % (ref 24–44)
MCH RBC QN AUTO: 31.1 PG (ref 27–31)
MCHC RBC AUTO-ENTMCNC: 32.5 % (ref 32–36)
MCV RBC AUTO: 95.5 FL (ref 78–100)
MONOCYTES ABSOLUTE: 1 K/CU MM
MONOCYTES RELATIVE PERCENT: 12.5 % (ref 0–4)
NUCLEATED RBC %: 0 %
PDW BLD-RTO: 14.3 % (ref 11.7–14.9)
PLATELET # BLD: 233 K/CU MM (ref 140–440)
PMV BLD AUTO: 10.8 FL (ref 7.5–11.1)
RBC # BLD: 3.54 M/CU MM (ref 4.6–6.2)
SEGMENTED NEUTROPHILS ABSOLUTE COUNT: 5.5 K/CU MM
SEGMENTED NEUTROPHILS RELATIVE PERCENT: 66 % (ref 36–66)
TOTAL IMMATURE NEUTOROPHIL: 0.04 K/CU MM
TOTAL NUCLEATED RBC: 0 K/CU MM
VANCOMYCIN TROUGH: 31.8 UG/ML (ref 10–20)
WBC # BLD: 8.3 K/CU MM (ref 4–10.5)

## 2023-02-06 PROCEDURE — 1124F ACP DISCUSS-NO DSCNMKR DOCD: CPT | Performed by: SPECIALIST

## 2023-02-06 PROCEDURE — 86140 C-REACTIVE PROTEIN: CPT

## 2023-02-06 PROCEDURE — 85025 COMPLETE CBC W/AUTO DIFF WBC: CPT

## 2023-02-06 PROCEDURE — 99212 OFFICE O/P EST SF 10 MIN: CPT | Performed by: SPECIALIST

## 2023-02-06 PROCEDURE — 3078F DIAST BP <80 MM HG: CPT | Performed by: SPECIALIST

## 2023-02-06 PROCEDURE — 80202 ASSAY OF VANCOMYCIN: CPT

## 2023-02-06 PROCEDURE — 3075F SYST BP GE 130 - 139MM HG: CPT | Performed by: SPECIALIST

## 2023-02-06 PROCEDURE — 85652 RBC SED RATE AUTOMATED: CPT

## 2023-02-06 NOTE — TELEPHONE ENCOUNTER
Raad Palm, from [facility] called and states that pt has a high vanco trough. Please advise.

## 2023-02-06 NOTE — CARE COORDINATION
[facility] Care Transitions Initial Follow Up Call    Call within 2 business days of discharge: Yes    Care Transition Nurse contacted the  Gaby, patient's spouse  by telephone to perform post hospital discharge assessment. Verified name and  with  spouse  as identifiers. Provided introduction to self, and explanation of the Care Transition Nurse role.     Patient: Boby Glasgow Patient : 1945   MRN: [id number]  Reason for Admission: Acute metabolic encephalopathy  Discharge Date: 2/3/23 RARS: Readmission Risk Score: 35      Last Discharge [facility] Facility       Date Complaint Diagnosis Description Type Department Provider    23 Altered Mental Status Acute metabolic encephalopathy ... ED to Hosp-Admission (Discharged) (ADMITTED) [facility] Kamryn Cowan MD; Will Tran, ...            Was this an external facility discharge? No Discharge Facility: [facility]    Challenges to be reviewed by the provider   Additional needs identified to be addressed with provider: No  none               Method of communication with provider: none.    Contacted patient for initial care transition follow up.  Spoke briefly with patient's spouse Gaby.  She states that Boby is doing okay.  Mental status back to baseline.  Reports he has c/o \"little abdominal pain but not near his liver\".  Denies having any nausea/vomiting, fever, chills.  He is eating and drinking fluids which he is tolerating.  She informed [facility] that the home health nurse ([facility]) is there currently and has been making visits since he returned home for his infusion.  [facility] [place] [facility] is teaching him how to administer his infusion.  She informed CTN that he has his follow up appointments scheduled.  She was not aware that he should follow up with ID.  [facility] gave her the phone number and address of provider-Dr. Baeza.  She will call the office to schedule follow up.  Did not review medication list d/t [facility] being there.  She does not have any  questions or needs at this time. Care Transition Nurse reviewed discharge instructions with  spouse  who verbalized understanding. The  spouse  was given an opportunity to ask questions and does not have any further questions or concerns at this time. Were discharge instructions available to patient? Yes. Reviewed appropriate site of care based on symptoms and resources available to patient including: PCP  Specialist  Home health. The  spouse  agrees to contact the PCP office for questions related to their healthcare. Advance Care Planning:   Does patient have an Advance Directive: not on file. Scheduled appointment with Specialist-has follow up with GI today, 2/6/23, PCP on 2/8/23, Pulmonology on 2/13/23    Offered patient enrollment in the Remote Patient Monitoring (RPM) program for in-home monitoring:  Did not discuss during initial visit . Care Transitions 24 Hour Call    Do you have a copy of your discharge instructions?: Yes  Do you have all of your prescriptions and are they filled?: Yes  Have you been contacted by a [address]?: No  Have you scheduled your follow up appointment?: Yes  How are you going to get to your appointment?: Car - family or friend to transport  Do you feel like you have everything you need to keep you well at home?: Yes  Care Transitions Interventions         Follow Up  Future Appointments   Date Time Provider Lucita Murphy   2/6/2023  2:10 PM Renetta Ulrich MD [facility]   2/7/2023 10:30 AM [facility] IR ROOM 1 [id number] [facility]   2/8/2023 10:15 AM Yaritza Taylor MD [facility]   2/13/2023  1:15 PM Elisa Miller MD [address] [place] [address]   2/21/2023 10:30 AM [facility] IR ROOM 1 [address] [facility]   3/7/2023 10:30 AM [facility] IR ROOM 1 [address] Susana Cerna Transition Nurse provided contact information. Plan for follow-up call in 1-2 days based on severity of symptoms and risk factors.   Plan for next call: symptom management-AMS, fever, chills, abdominal pain/discomfort, nausea/vomiting, f/u with ID?  Med review, any new or worsening symptoms    Kip Current, RN

## 2023-02-06 NOTE — PROGRESS NOTES
Spoke with patient's wife Nettie Irwin and patient  is going to see Dr Isa Guerra today and she will let me know if patient is having his paracentesis tomorrow, because she stated \" that he had it done as in inpatient and he just got out of the hospital.

## 2023-02-06 NOTE — PROGRESS NOTES
Spoke with patient's wife Kalli Krueger and patient  is going to see Dr Ozzy Nath today and she will let me know if patient is having his paracentesis tomorrow, because she stated \" that he had it done as in inpatient and he just got out of the hospital.Patient's wife called back and he will be in for his procedure tomorrow.

## 2023-02-06 NOTE — PROGRESS NOTES
Gastroenterology Office Note            Artemio Dominguez. Ozzy Nath MD      Subjective:      Patient ID:      Phi Valencia                 1945    CC: follow up from hospital    HPI:   Was recently in the hospital and was found to have SBE  He states that he hasnt felt this good in years      Review of Systems:    see HPI for positives and pertinent negatives.  All other systems reviewed and are negative     Objective:   PHYSICAL EXAM:    Vitals:  /74 (Site: Left Upper Arm, Position: Sitting, Cuff Size: Medium Adult)   Pulse 88   Temp 97.9 °F (36.6 °C) (Infrared)   Ht 5' 8\" (1.727 m)   Wt 229 lb (103.9 kg)   SpO2 98%   BMI 34.82 kg/m²   CONSTITUTIONAL: alert    LUNGS:  clear to auscultation  CARDIOVASCULAR:  regular rate and rhythm and no murmur noted  ABDOMEN:  normal bowel sounds, distended with ascites, non-tender and no masses palpated, no hepatosplenomegaly  EXTREMITIES: no clubbing, cyanosis, or edema    Assessment:      IMPRESSION:  1) cirrhosis- due to prior steatosis (alcohol and metabolic)- complicated by esoph varices (have never bled), ascites, 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

## 2023-02-07 ENCOUNTER — HOSPITAL ENCOUNTER (OUTPATIENT)
Dept: INTERVENTIONAL RADIOLOGY/VASCULAR | Age: 78
Discharge: HOME | End: 2023-02-07
Payer: COMMERCIAL

## 2023-02-07 VITALS
TEMPERATURE: 98.4 F | BODY MASS INDEX: 33.34 KG/M2 | HEIGHT: 68 IN | SYSTOLIC BLOOD PRESSURE: 155 MMHG | HEART RATE: 100 BPM | DIASTOLIC BLOOD PRESSURE: 76 MMHG | OXYGEN SATURATION: 93 % | RESPIRATION RATE: 18 BRPM | WEIGHT: 220 LBS

## 2023-02-07 DIAGNOSIS — R18.8 OTHER ASCITES: ICD-10-CM

## 2023-02-07 PROCEDURE — P9047 ALBUMIN (HUMAN), 25%, 50ML: HCPCS

## 2023-02-07 PROCEDURE — 7100000011 HC PHASE II RECOVERY - ADDTL 15 MIN

## 2023-02-07 PROCEDURE — 49083 ABD PARACENTESIS W/IMAGING: CPT

## 2023-02-07 PROCEDURE — 7100000010 HC PHASE II RECOVERY - FIRST 15 MIN

## 2023-02-07 PROCEDURE — C1729 CATH, DRAINAGE: HCPCS

## 2023-02-07 PROCEDURE — 6360000002 HC RX W HCPCS

## 2023-02-07 ASSESSMENT — PAIN - FUNCTIONAL ASSESSMENT: PAIN_FUNCTIONAL_ASSESSMENT: 0-10

## 2023-02-07 ASSESSMENT — PAIN SCALES - GENERAL: PAINLEVEL_OUTOF10: 0

## 2023-02-07 NOTE — PROGRESS NOTES
1150  Patient arrived back to [facility]. Report given to this nurse from [facility]. Patient A&O  Beverage of choice offered to patient. Call light in reach and bed in lowest position. Patient came in with IV so I did not remove. 1200 Discharge instructions given to Cheryle Phillips. 1202 Patient sitting on side of bed getting dressed assisted by Cheryle Phillips. [id number] Patient escorted to car via foot and  transported home by Cheryle Phillips.

## 2023-02-07 NOTE — DISCHARGE INSTRUCTIONS
[facility]    Patient Discharge Instructions Abdominal Fluid Drainage      You have had had an Abdominal Fluid Drainage in the Radiology Department. Below are guidelines for you to follow after your test is completed:    Go home and rest quietly for the remainder of the day. DO NOT drive, operate appliances, or make any legal decisions today. You may resume normal activities tomorrow. Have a responsible adult drive you home and remain with you for the remainder of the day. You may resume your normal diet after the procedure. Avoid alcoholic beverages and depressant drugs for 24 hours. You may resume your previous medications unless directed not to by your physician or the Radiologist. Elbert Miranda may use Tylenol (one or two tablets every four to six hours) for discomfort at the puncture site. If you develop severe pain, swelling, or redness at the site, call the Radiology Department. Call your physician immediately if you develop a rapid pulse (greater than 100 beats per minute), feel faint, sweat profusely, experience a sudden onset of weakness, or experience increased pain or swelling or saturation of dressing at the puncture site. Call your physician immediately if you develop a sudden onset of rapid breathing (greater than 20 breaths per minute), upper back or chest pain, shortness of breath, sweating, skin color change, or a sudden onset of anxiety. Report a temperature greater than 101 degrees Fahrenheit (38.8 degrees Celsius) to us or to your physician. Check the dressing throughout the day for an increase in drainage. Keep the dressing dry for 24 hours. Replace with a Band-Aid if necessary. You may shower 24 hours after the procedure. Do not smoke for 24 hours after the procedure. Call your physician for a follow-up appointment.   If any questions or complications develop after you go home, please call the Radiology Department at [phone]

## 2023-02-07 NOTE — OR NURSING
INFORMED CONSENT:  Obtained prior to procedure by Dr. Reilly Fore                                              Consent placed in chart. ASSESSMENT: Patient alert and oriented and aware of procedure to be done. No distress noted    BARRIER PRECAUTIONS & STERILE TECHNIQUE:               Pt remains in [facility] [place] bed and positioned for comfort. Warm blankets given. Pt placed on vital signs Monitor. Pt and draped in a sterile fashion. PAIN/LOCAL ANESTHESIA/SEDATION MANAGEMENT:           Local: Lidocaine 1% given by Dr. Freed Seeds:           ACCESS TIME: 1100 with Safe-T-Centesis          US/FLUORO: US guided with 2 images          1100--milky while fluid returns    11--total of 6000cc of milky white fluid removed.     STERILE DRESSINGS: gauze and tegaderm    SPECIMENS: None ordered    EBL:      Less than 1 cc    REPORT CALLED TO: SARANYA Plummer

## 2023-02-08 ENCOUNTER — TELEPHONE (OUTPATIENT)
Dept: INFECTIOUS DISEASES | Age: 78
End: 2023-02-08

## 2023-02-08 ENCOUNTER — HOSPITAL ENCOUNTER (OUTPATIENT)
Age: 78
Setting detail: SPECIMEN
Discharge: HOME | End: 2023-02-08
Payer: COMMERCIAL

## 2023-02-08 ENCOUNTER — OFFICE VISIT (OUTPATIENT)
Dept: FAMILY MEDICINE CLINIC | Age: 78
End: 2023-02-08
Payer: COMMERCIAL

## 2023-02-08 VITALS
SYSTOLIC BLOOD PRESSURE: 126 MMHG | BODY MASS INDEX: 33.04 KG/M2 | WEIGHT: 218 LBS | HEART RATE: 82 BPM | OXYGEN SATURATION: 96 % | DIASTOLIC BLOOD PRESSURE: 74 MMHG | HEIGHT: 68 IN

## 2023-02-08 DIAGNOSIS — E11.22 TYPE 2 DIABETES MELLITUS WITH STAGE 3 CHRONIC KIDNEY DISEASE, WITHOUT LONG-TERM CURRENT USE OF INSULIN, UNSPECIFIED WHETHER STAGE 3A OR 3B CKD (HCC): ICD-10-CM

## 2023-02-08 DIAGNOSIS — N18.30 TYPE 2 DIABETES MELLITUS WITH STAGE 3 CHRONIC KIDNEY DISEASE, WITHOUT LONG-TERM CURRENT USE OF INSULIN, UNSPECIFIED WHETHER STAGE 3A OR 3B CKD (HCC): ICD-10-CM

## 2023-02-08 DIAGNOSIS — R78.81 COAGULASE NEGATIVE STAPHYLOCOCCUS BACTEREMIA: ICD-10-CM

## 2023-02-08 DIAGNOSIS — K76.6 PORTAL HYPERTENSION (HCC): ICD-10-CM

## 2023-02-08 DIAGNOSIS — K76.82 HEPATIC ENCEPHALOPATHY: ICD-10-CM

## 2023-02-08 DIAGNOSIS — B95.7 COAGULASE NEGATIVE STAPHYLOCOCCUS BACTEREMIA: ICD-10-CM

## 2023-02-08 DIAGNOSIS — K70.31 ALCOHOLIC CIRRHOSIS OF LIVER WITH ASCITES (HCC): ICD-10-CM

## 2023-02-08 DIAGNOSIS — Z92.89 HISTORY OF RECENT HOSPITALIZATION: Primary | ICD-10-CM

## 2023-02-08 DIAGNOSIS — I50.9 CHRONIC HEART FAILURE, UNSPECIFIED HEART FAILURE TYPE (HCC): ICD-10-CM

## 2023-02-08 DIAGNOSIS — N18.31 STAGE 3A CHRONIC KIDNEY DISEASE (HCC): ICD-10-CM

## 2023-02-08 LAB
DOSE AMOUNT: ABNORMAL
DOSE TIME: ABNORMAL
VANCOMYCIN TROUGH: 27 UG/ML (ref 10–20)

## 2023-02-08 PROCEDURE — 80202 ASSAY OF VANCOMYCIN: CPT

## 2023-02-08 PROCEDURE — 99214 OFFICE O/P EST MOD 30 MIN: CPT | Performed by: FAMILY MEDICINE

## 2023-02-08 PROCEDURE — 1124F ACP DISCUSS-NO DSCNMKR DOCD: CPT | Performed by: FAMILY MEDICINE

## 2023-02-08 PROCEDURE — 3074F SYST BP LT 130 MM HG: CPT | Performed by: FAMILY MEDICINE

## 2023-02-08 PROCEDURE — 3078F DIAST BP <80 MM HG: CPT | Performed by: FAMILY MEDICINE

## 2023-02-08 RX ORDER — SPIRONOLACTONE 100 MG/1
100 TABLET, FILM COATED ORAL 2 TIMES DAILY
Qty: 30 TABLET | Refills: 2 | Status: ON HOLD | OUTPATIENT
Start: 2023-02-08

## 2023-02-08 ASSESSMENT — ENCOUNTER SYMPTOMS
TROUBLE SWALLOWING: 0
DIARRHEA: 0
VOMITING: 0
NAUSEA: 0
CHEST TIGHTNESS: 0
ABDOMINAL PAIN: 0
WHEEZING: 0
SHORTNESS OF BREATH: 0
BLOOD IN STOOL: 0
EYE PAIN: 0

## 2023-02-08 ASSESSMENT — PATIENT HEALTH QUESTIONNAIRE - PHQ9
SUM OF ALL RESPONSES TO PHQ9 QUESTIONS 1 & 2: 0
SUM OF ALL RESPONSES TO PHQ QUESTIONS 1-9: 0
1. LITTLE INTEREST OR PLEASURE IN DOING THINGS: 0
2. FEELING DOWN, DEPRESSED OR HOPELESS: 0
SUM OF ALL RESPONSES TO PHQ QUESTIONS 1-9: 0

## 2023-02-08 NOTE — PROGRESS NOTES
2/8/2023    Sanjuana Sellers    Chief Complaint   Patient presents with    3 Month Follow-Up       HPI  History was obtained from the patient. Maria D Painter is a 78 y.o. male who presents today with recheck. Patient was then [facility] from 1/28 through 2/3/2023. Had increased confusion felt to be secondary to hepatic encephalopathy. He has known alcoholic cirrhosis, portal hypertension, and recurrent ascites. He also has a history of hypertension, poor hearing , diabetes mellitus type 2, and has been on ongoing antibiotics with titrated dose of vancomycin for staph bacteremia. Patient is feeling much better now -his confusion has resolved and he underwent a moderate volume paracentesis yesterday. He denies other intercurrent change or problem. REVIEW OF SYMPTOMS    Review of Systems   Constitutional:  Negative for activity change and fatigue. HENT:  Positive for hearing loss. Negative for congestion, mouth sores and trouble swallowing. Eyes:  Negative for pain and visual disturbance. Respiratory:  Negative for chest tightness, shortness of breath and wheezing. Cardiovascular:  Positive for leg swelling. Negative for chest pain and palpitations. Minimal current abdominal distention secondary to recent paracentesis. Gastrointestinal:  Negative for abdominal pain, blood in stool, diarrhea, nausea and vomiting. Endocrine: Negative for polydipsia and polyuria. Genitourinary:  Negative for dysuria, frequency and urgency. Musculoskeletal:  Negative for arthralgias, gait problem and neck stiffness. Skin:  Negative for rash. Allergic/Immunologic: Negative for environmental allergies. Neurological:  Negative for dizziness, seizures, speech difficulty and weakness. Patient with intermittent history of confusion felt to be from hepatic encephalopathy. Hematological:  Does not bruise/bleed easily.    Psychiatric/Behavioral:  Negative for agitation, confusion, hallucinations, self-injury and suicidal ideas. The patient is not nervous/anxious. PAST MEDICAL HISTORY  Past Medical History:   Diagnosis Date    Anxiety     Cirrhosis, alcoholic ([address]     Diabetes mellitus ([address]     GERD (gastroesophageal reflux disease)     GERD (gastroesophageal reflux disease)     Hyperlipidemia     Hypertension     Portal hypertension (HCC)     Pulmonary nodules     Sleep apnea     SOB (shortness of breath)     Thyroid disease        FAMILY HISTORY  Family History   Problem Relation Age of Onset    Hypertension Brother     Diabetes Other        SOCIAL HISTORY  Social History     Socioeconomic History    Marital status:      Spouse name: None    Number of children: None    Years of education: None    Highest education level: None   Occupational History     Comment: Retired    Tobacco Use    Smoking status: Former     Packs/day: 1.00     Years: 14.00     Pack years: 14.00     Types: Cigarettes     Quit date:      Years since quittin.1    Smokeless tobacco: Never   Vaping Use    Vaping Use: Never used   Substance and Sexual Activity    Alcohol use: Not Currently    Drug use: Never    Sexual activity: Yes     Partners: Female     Social Determinants of Health     Financial Resource Strain: Low Risk     Difficulty of Paying Living Expenses: Not hard at all   Food Insecurity: No Food Insecurity    Worried About Running Out of Food in the Last Year: Never true    Ran Out of Food in the Last Year: Never true        SURGICAL HISTORY  Past Surgical History:   Procedure Laterality Date    APPENDECTOMY      CHOLECYSTECTOMY      COLONOSCOPY      ENDOSCOPY, COLON, DIAGNOSTIC      FOOT SURGERY      needle removed,not sure which foot, per wife.     TONSILLECTOMY      UPPER GASTROINTESTINAL ENDOSCOPY N/A 2022    EGD BIOPSY performed by Mery Dudley MD at [address]  Current Outpatient Medications   Medication Sig Dispense Refill spironolactone (ALDACTONE) 100 MG tablet Take 1 tablet by mouth 2 times daily 30 tablet 2    rifAXIMin (XIFAXAN) 550 MG tablet Take 1 tablet by mouth 2 times daily 42 tablet 0    furosemide (LASIX) 40 MG tablet Take 1 tablet by mouth daily 60 tablet 3    levothyroxine (SYNTHROID) 50 MCG tablet Take 1 tablet by mouth Daily 90 tablet 1    DULoxetine (CYMBALTA) 60 MG extended release capsule TAKE 1 CAPSULE BY MOUTH ONCE DAILY 90 capsule 1    thiamine 100 MG tablet Take 1 tablet by mouth daily 30 tablet 3    albuterol sulfate HFA (PROVENTIL;VENTOLIN;PROAIR) 108 (90 Base) MCG/ACT inhaler INHALE 2 PUFFS BY MOUTH 4 TIMES DAILY AS NEEDED FOR WHEEZING 1 each 2    betamethasone valerate (VALISONE) 0.1 % lotion Apply topically 2 times daily prn scalp rash 60 mL 3    nadolol (CORGARD) 40 MG tablet Take 1 tablet by mouth in the morning. (Patient taking differently: Take 40 mg by mouth nightly) 90 tablet 3    lactulose (CHRONULAC) 10 GM/15ML solution Take 30 mLs by mouth 3 times daily Hold if having diarrhea 2700 mL 5    Multiple Vitamin (MULTI VITAMIN MENS PO) Take 1 tablet by mouth daily      ESOMEPRAZOLE MAGNESIUM PO Take 20 mg by mouth daily OTC      Omega-3 1000 MG CAPS Take 2,000 mg by mouth daily       No current facility-administered medications for this visit. ALLERGIES  Allergies   Allergen Reactions    Lisinopril Swelling     Tongue swelling      Zetia [Ezetimibe] Swelling     Facial swelling    Atorvastatin     Codeine Other (See Comments)     Blood pressure drops    Hydrochlorothiazide     Hydroxyzine      Fatigue and depressed    Lexapro [Escitalopram Oxalate]      Increased depression    Pravastatin        PHYSICAL EXAM    /74 (Site: Left Upper Arm, Position: Sitting, Cuff Size: Large Adult)   Pulse 82   Ht 5' 8\" (1.727 m)   Wt 218 lb (98.9 kg)   SpO2 96%   BMI 33.15 kg/m²     Physical Exam  Vitals and nursing note reviewed. Constitutional:       General: He is not in acute distress. Appearance: He is well-developed. He is obese. He is not ill-appearing or toxic-appearing. HENT:      Head: Normocephalic and atraumatic. Nose: Nose normal.      Mouth/Throat:      Mouth: Mucous membranes are moist.   Eyes:      Pupils: Pupils are equal, round, and reactive to light. Cardiovascular:      Rate and Rhythm: Normal rate and regular rhythm. Heart sounds: Normal heart sounds. No murmur heard. No gallop. Pulmonary:      Effort: Pulmonary effort is normal. No respiratory distress. Breath sounds: Normal breath sounds. No wheezing, rhonchi or rales. Abdominal:      Palpations: Abdomen is soft. Musculoskeletal:         General: No swelling or deformity. Normal range of motion. Cervical back: Normal range of motion and neck supple. No rigidity. Right lower leg: Edema present. Left lower leg: Edema present. Lymphadenopathy:      Cervical: No cervical adenopathy. Skin:     General: Skin is warm and dry. Coloration: Skin is not jaundiced. Findings: No bruising. Neurological:      General: No focal deficit present. Mental Status: He is alert and oriented to person, place, and time. Mental status is at baseline. Cranial Nerves: No cranial nerve deficit. Motor: No weakness. Psychiatric:         Mood and Affect: Mood normal.         Behavior: Behavior normal.         Thought Content: Thought content normal.       ASSESSMENT & PLAN     Diagnosis Orders   1. History of recent hospitalization        2. Alcoholic cirrhosis of liver with ascites ([facility]        3. Chronic heart failure, unspecified heart failure type ([facility]        4. Type 2 diabetes mellitus with stage 3 chronic kidney disease, without long-term current use of insulin, unspecified whether stage 3a or 3b CKD (HCC)  Hemoglobin A1C      5. Stage 3a chronic kidney disease ([address]        6. Portal hypertension (HCC)        7. Coagulase negative Staphylococcus bacteremia        8.  Hepatic encephalopathy  Ammonia      Would keep him on usual regimen. Discharge med list was reviewed. Will need A1c and serum ammonia level in the next 2 to 3 weeks. Advised to keep appointments with subspecialists. Call with changes or problems. Return in about 3 months (around 5/8/2023).          Electronically signed by Alley Figueroa MD on 2/8/2023

## 2023-02-09 ENCOUNTER — CARE COORDINATION (OUTPATIENT)
Dept: CASE MANAGEMENT | Age: 78
End: 2023-02-09

## 2023-02-09 ENCOUNTER — TELEPHONE (OUTPATIENT)
Dept: INFECTIOUS DISEASES | Age: 78
End: 2023-02-09

## 2023-02-09 ENCOUNTER — TELEPHONE (OUTPATIENT)
Dept: FAMILY MEDICINE CLINIC | Age: 78
End: 2023-02-09

## 2023-02-09 NOTE — TELEPHONE ENCOUNTER
Hillary Menendez, from [address] [place] [address] Rd called and states that pt is having problems urinating. Hillary Menendez, wants to know if you would like to have the pt do a urine culture?

## 2023-02-09 NOTE — CARE COORDINATION
1215 Sandhya Zaidi Care Transitions Follow Up Call    Patient Current Location:  Home: [facility] [address] [address]    Care Transition Nurse contacted the  Rayray Terrazas, spouse  by telephone to follow up after admission on 23. Verified name and  with  spouse  as identifiers. Patient: Roland Shaw  Patient : 1945   MRN: [id number]  Reason for Admission: Acute metabolic encephalopathy  Discharge Date: 2/3/23 RARS: Readmission Risk Score: 35      Needs to be reviewed by the provider   Additional needs identified to be addressed with provider: No  none             Method of communication with provider: none. Contacted patient for care transition follow up. Spoke with patient's spouse Rayray Terrazas. She states that Bryanna Mayes is doing pretty good. Reports he is feeling a little weak which she state may be from the Abx being on hold. Vancomycin infusion on hold. Home health nurse will be making a visit tomorrow to draw more labs. She reports he is back to his baseline. No longer having confusion. Denies having any fever, chills, abdominal pain/discomfort, nausea/vomiting, increased shortness of breath. Reports his swelling is \"not too bad\". He is eating and drinking fluids w/o issues. He is not working with therapy and states they are focusing on getting his infection cleared. He has a follow up with ID next Thursday, 23. No questions or needs at this time. Addressed changes since last contact:   Per spouse, Vancomycin infusion on hold. Home health nurse making visit tomorrow to draw labs again. Discussed follow-up appointments. If no appointment was previously scheduled, appointment scheduling offered: Yes. Is follow up appointment scheduled within 7 days of discharge? Yes.     Follow Up  Future Appointments   Date Time Provider Lucita Murphy   2/10/2023  MD Ted [facility] PULM MMA   2023  9:30 AM Jackson Stephens MD [facility] ID [facility]   2023 10:30 AM [facility] IR ROOM 1 SRMZ [facility]   3/7/2023 10:30 AM [facility] IR ROOM 1 SRMZ SPPROC [facility] Radiolo   5/8/2023  1:30 PM Fer Guillen MD [facility] Gastro MMA   5/8/2023  2:45 PM Keo Barr MD [facility] FPS MMA     Patients top risk factors for readmission: functional physical ability and medical condition-Alcoholic cirrhosis of liver with ascites, AMS, Bacteremia d/t methicillin resistant staph epidermis, Chronic HF, DM, CKD stg III  Interventions to address risk factors: Scheduled appointment with Specialist-has ID appt on 2/16/23    Offered patient enrollment in the Remote Patient Monitoring (RPM) program for in-home monitoring: NA.     Care Transitions Subsequent and Final Call    Subsequent and Final Calls  Do you have any ongoing symptoms?: Yes  Patient-reported symptoms: Weakness  Have your medications changed?: Yes  Patient Reports: Vancomycin IV on hold  Do you have any questions related to your medications?: No  Do you currently have any active services?: Yes  Are you currently active with any services?: Home Health  Do you have any needs or concerns that I can assist you with?: No  Care Transitions Interventions  Other Interventions:             Care Transition Nurse provided contact information for future needs. Plan for follow-up call in 7-10 days based on severity of symptoms and risk factors. Plan for next call: symptom management-any new or worsening symptoms, still on Vancomycin IV?     Kelsea Verde RN

## 2023-02-09 NOTE — TELEPHONE ENCOUNTER
To Dr. Pearson Other-    Reporting:    Patient stated he has trouble voiding urine completely---  Could you please give a verbal order to do a Urine Analysis and Culture for patient.

## 2023-02-10 ENCOUNTER — APPOINTMENT (OUTPATIENT)
Dept: GENERAL RADIOLOGY | Age: 78
DRG: 432 | End: 2023-02-10
Payer: MEDICARE

## 2023-02-10 ENCOUNTER — APPOINTMENT (OUTPATIENT)
Dept: CT IMAGING | Age: 78
DRG: 432 | End: 2023-02-10
Payer: MEDICARE

## 2023-02-10 ENCOUNTER — HOSPITAL ENCOUNTER (INPATIENT)
Age: 78
LOS: 5 days | Discharge: HOME HEALTH | DRG: 432 | End: 2023-02-15
Attending: EMERGENCY MEDICINE | Admitting: STUDENT IN AN ORGANIZED HEALTH CARE EDUCATION/TRAINING PROGRAM
Payer: MEDICARE

## 2023-02-10 ENCOUNTER — APPOINTMENT (OUTPATIENT)
Dept: INTERVENTIONAL RADIOLOGY/VASCULAR | Age: 78
DRG: 432 | End: 2023-02-10
Payer: MEDICARE

## 2023-02-10 DIAGNOSIS — J90 PLEURAL EFFUSION: ICD-10-CM

## 2023-02-10 DIAGNOSIS — R41.82 ALTERED MENTAL STATUS, UNSPECIFIED ALTERED MENTAL STATUS TYPE: ICD-10-CM

## 2023-02-10 DIAGNOSIS — K74.60 CIRRHOSIS OF LIVER WITH ASCITES, UNSPECIFIED HEPATIC CIRRHOSIS TYPE (HCC): ICD-10-CM

## 2023-02-10 DIAGNOSIS — R18.8 CIRRHOSIS OF LIVER WITH ASCITES, UNSPECIFIED HEPATIC CIRRHOSIS TYPE (HCC): ICD-10-CM

## 2023-02-10 DIAGNOSIS — R79.89 INCREASED AMMONIA LEVEL: Primary | ICD-10-CM

## 2023-02-10 LAB
ACETAMINOPHEN LEVEL: <5 UG/ML (ref 15–30)
ALBUMIN SERPL-MCNC: 2.9 GM/DL (ref 3.4–5)
ALCOHOL SCREEN SERUM: <0.01 %WT/VOL
ALP BLD-CCNC: 90 IU/L (ref 40–129)
ALT SERPL-CCNC: 22 U/L (ref 10–40)
AMMONIA: 125 UMOL/L (ref 16–60)
AMPHETAMINES: NEGATIVE
ANION GAP SERPL CALCULATED.3IONS-SCNC: 9 MMOL/L (ref 4–16)
APTT: 34.1 SECONDS (ref 25.1–37.1)
AST SERPL-CCNC: 31 IU/L (ref 15–37)
BARBITURATE SCREEN URINE: NEGATIVE
BASE EXCESS MIXED: 3.3 (ref 0–1.2)
BASOPHILS ABSOLUTE: 0.1 K/CU MM
BASOPHILS RELATIVE PERCENT: 0.9 % (ref 0–1)
BENZODIAZEPINE SCREEN, URINE: NEGATIVE
BILIRUB SERPL-MCNC: 0.5 MG/DL (ref 0–1)
BILIRUBIN URINE: NEGATIVE MG/DL
BLOOD, URINE: NEGATIVE
BUN SERPL-MCNC: 40 MG/DL (ref 6–23)
CALCIUM SERPL-MCNC: 9.3 MG/DL (ref 8.3–10.6)
CANNABINOID SCREEN URINE: NEGATIVE
CHLORIDE BLD-SCNC: 102 MMOL/L (ref 99–110)
CLARITY: CLEAR
CO2: 25 MMOL/L (ref 21–32)
COCAINE METABOLITE: NEGATIVE
COLOR: YELLOW
COMMENT UA: NORMAL
COMMENT: ABNORMAL
CREAT SERPL-MCNC: 2.1 MG/DL (ref 0.9–1.3)
CRP SERPL HS-MCNC: 19.1 MG/L
DIFFERENTIAL TYPE: ABNORMAL
DOSE AMOUNT: ABNORMAL
DOSE AMOUNT: ABNORMAL
DOSE AMOUNT: NORMAL
DOSE TIME: ABNORMAL
DOSE TIME: ABNORMAL
DOSE TIME: NORMAL
EKG ATRIAL RATE: 65 BPM
EKG DIAGNOSIS: NORMAL
EKG P AXIS: 37 DEGREES
EKG P-R INTERVAL: 180 MS
EKG Q-T INTERVAL: 448 MS
EKG QRS DURATION: 72 MS
EKG QTC CALCULATION (BAZETT): 465 MS
EKG R AXIS: 53 DEGREES
EKG T AXIS: 53 DEGREES
EKG VENTRICULAR RATE: 65 BPM
EOSINOPHILS ABSOLUTE: 0.6 K/CU MM
EOSINOPHILS RELATIVE PERCENT: 7.2 % (ref 0–3)
ERYTHROCYTE SEDIMENTATION RATE: 43 MM/HR (ref 0–20)
FLUID TYPE: NORMAL INDEX
GFR SERPL CREATININE-BSD FRML MDRD: 32 ML/MIN/1.73M2
GLUCOSE SERPL-MCNC: 108 MG/DL (ref 70–99)
GLUCOSE, FLUID: 114 MG/DL
GLUCOSE, URINE: NEGATIVE MG/DL
HCO3 VENOUS: 27.8 MMOL/L (ref 19–25)
HCT VFR BLD CALC: 32.3 % (ref 42–52)
HEMOGLOBIN: 10.8 GM/DL (ref 13.5–18)
IMMATURE NEUTROPHIL %: 0.4 % (ref 0–0.43)
INR BLD: 0.99 INDEX
KETONES, URINE: NEGATIVE MG/DL
LACTATE DEHYDROGENASE, FLUID: 43 IU/L
LACTATE: 1.1 MMOL/L (ref 0.5–1.9)
LEUKOCYTE ESTERASE, URINE: NEGATIVE
LIPASE: 49 IU/L (ref 13–60)
LYMPHOCYTES ABSOLUTE: 0.9 K/CU MM
LYMPHOCYTES RELATIVE PERCENT: 10.6 % (ref 24–44)
LYMPHOCYTES, BODY FLUID: 41 %
MAGNESIUM: 2.1 MG/DL (ref 1.8–2.4)
MCH RBC QN AUTO: 31.3 PG (ref 27–31)
MCHC RBC AUTO-ENTMCNC: 33.4 % (ref 32–36)
MCV RBC AUTO: 93.6 FL (ref 78–100)
MESOTHELIAL FLUID: 0 /100 WBC
MONOCYTE, FLUID: 42 %
MONOCYTES ABSOLUTE: 1 K/CU MM
MONOCYTES RELATIVE PERCENT: 12.2 % (ref 0–4)
NEUTROPHIL, FLUID: 17 %
NITRITE URINE, QUANTITATIVE: NEGATIVE
NUCLEATED RBC %: 0 %
O2 SAT, VEN: 95.5 % (ref 50–70)
OPIATES, URINE: NEGATIVE
OTHER CELLS FLUID: 0
OXYCODONE: NEGATIVE
PCO2, VEN: 41 MMHG (ref 38–52)
PDW BLD-RTO: 14.4 % (ref 11.7–14.9)
PH VENOUS: 7.44 (ref 7.32–7.42)
PH, URINE: 5.5 (ref 5–8)
PHENCYCLIDINE, URINE: NEGATIVE
PLATELET # BLD: 220 K/CU MM (ref 140–440)
PMV BLD AUTO: 10.4 FL (ref 7.5–11.1)
PO2, VEN: 111 MMHG (ref 28–48)
POTASSIUM SERPL-SCNC: 4.5 MMOL/L (ref 3.5–5.1)
PRO-BNP: 342.4 PG/ML
PROCALCITONIN SERPL-MCNC: 0.12 NG/ML
PROTEIN FLUID: 1.3 G/DL
PROTEIN UA: NEGATIVE MG/DL
PROTHROMBIN TIME: 12.8 SECONDS (ref 11.7–14.5)
RBC # BLD: 3.45 M/CU MM (ref 4.6–6.2)
RBC FLUID: 3000 /CU MM
SALICYLATE LEVEL: <0.3 MG/DL (ref 15–30)
SEGMENTED NEUTROPHILS ABSOLUTE COUNT: 5.6 K/CU MM
SEGMENTED NEUTROPHILS RELATIVE PERCENT: 68.7 % (ref 36–66)
SODIUM BLD-SCNC: 136 MMOL/L (ref 135–145)
SPECIFIC GRAVITY UA: 1.01 (ref 1–1.03)
TOTAL CK: 57 IU/L (ref 38–174)
TOTAL IMMATURE NEUTOROPHIL: 0.03 K/CU MM
TOTAL NUCLEATED RBC: 0 K/CU MM
TOTAL PROTEIN: 7.1 GM/DL (ref 6.4–8.2)
TROPONIN T: 0.02 NG/ML
TROPONIN T: 0.02 NG/ML
TSH SERPL DL<=0.005 MIU/L-ACNC: 2.41 UIU/ML (ref 0.27–4.2)
UROBILINOGEN, URINE: 0.2 MG/DL (ref 0.2–1)
VANCOMYCIN RANDOM: 17.1 UG/ML
WBC # BLD: 8.2 K/CU MM (ref 4–10.5)
WBC FLUID: 229 /CU MM

## 2023-02-10 PROCEDURE — 93005 ELECTROCARDIOGRAM TRACING: CPT | Performed by: EMERGENCY MEDICINE

## 2023-02-10 PROCEDURE — 1200000000 HC SEMI PRIVATE

## 2023-02-10 PROCEDURE — 82140 ASSAY OF AMMONIA: CPT

## 2023-02-10 PROCEDURE — 82550 ASSAY OF CK (CPK): CPT

## 2023-02-10 PROCEDURE — 82805 BLOOD GASES W/O2 SATURATION: CPT

## 2023-02-10 PROCEDURE — 84145 PROCALCITONIN (PCT): CPT

## 2023-02-10 PROCEDURE — 74018 RADEX ABDOMEN 1 VIEW: CPT

## 2023-02-10 PROCEDURE — 80053 COMPREHEN METABOLIC PANEL: CPT

## 2023-02-10 PROCEDURE — 2580000003 HC RX 258

## 2023-02-10 PROCEDURE — 89051 BODY FLUID CELL COUNT: CPT

## 2023-02-10 PROCEDURE — 87040 BLOOD CULTURE FOR BACTERIA: CPT

## 2023-02-10 PROCEDURE — 83735 ASSAY OF MAGNESIUM: CPT

## 2023-02-10 PROCEDURE — G0480 DRUG TEST DEF 1-7 CLASSES: HCPCS

## 2023-02-10 PROCEDURE — 85652 RBC SED RATE AUTOMATED: CPT

## 2023-02-10 PROCEDURE — 87205 SMEAR GRAM STAIN: CPT

## 2023-02-10 PROCEDURE — 84157 ASSAY OF PROTEIN OTHER: CPT

## 2023-02-10 PROCEDURE — 6360000002 HC RX W HCPCS

## 2023-02-10 PROCEDURE — 85730 THROMBOPLASTIN TIME PARTIAL: CPT

## 2023-02-10 PROCEDURE — 70450 CT HEAD/BRAIN W/O DYE: CPT

## 2023-02-10 PROCEDURE — 87070 CULTURE OTHR SPECIMN AEROBIC: CPT

## 2023-02-10 PROCEDURE — 2709999900 IR US GUIDED PARACENTESIS

## 2023-02-10 PROCEDURE — 99285 EMERGENCY DEPT VISIT HI MDM: CPT

## 2023-02-10 PROCEDURE — 6360000002 HC RX W HCPCS: Performed by: NURSE PRACTITIONER

## 2023-02-10 PROCEDURE — 85610 PROTHROMBIN TIME: CPT

## 2023-02-10 PROCEDURE — 82945 GLUCOSE OTHER FLUID: CPT

## 2023-02-10 PROCEDURE — 80307 DRUG TEST PRSMV CHEM ANLYZR: CPT

## 2023-02-10 PROCEDURE — 2580000003 HC RX 258: Performed by: NURSE PRACTITIONER

## 2023-02-10 PROCEDURE — 85025 COMPLETE CBC W/AUTO DIFF WBC: CPT

## 2023-02-10 PROCEDURE — 49083 ABD PARACENTESIS W/IMAGING: CPT

## 2023-02-10 PROCEDURE — 94761 N-INVAS EAR/PLS OXIMETRY MLT: CPT

## 2023-02-10 PROCEDURE — C9113 INJ PANTOPRAZOLE SODIUM, VIA: HCPCS | Performed by: NURSE PRACTITIONER

## 2023-02-10 PROCEDURE — 80202 ASSAY OF VANCOMYCIN: CPT

## 2023-02-10 PROCEDURE — 74176 CT ABD & PELVIS W/O CONTRAST: CPT

## 2023-02-10 PROCEDURE — 86140 C-REACTIVE PROTEIN: CPT

## 2023-02-10 PROCEDURE — 84484 ASSAY OF TROPONIN QUANT: CPT

## 2023-02-10 PROCEDURE — 36415 COLL VENOUS BLD VENIPUNCTURE: CPT

## 2023-02-10 PROCEDURE — 6370000000 HC RX 637 (ALT 250 FOR IP): Performed by: EMERGENCY MEDICINE

## 2023-02-10 PROCEDURE — 83605 ASSAY OF LACTIC ACID: CPT

## 2023-02-10 PROCEDURE — 81003 URINALYSIS AUTO W/O SCOPE: CPT

## 2023-02-10 PROCEDURE — 83615 LACTATE (LD) (LDH) ENZYME: CPT

## 2023-02-10 PROCEDURE — 83690 ASSAY OF LIPASE: CPT

## 2023-02-10 PROCEDURE — 0W9G3ZZ DRAINAGE OF PERITONEAL CAVITY, PERCUTANEOUS APPROACH: ICD-10-PCS | Performed by: RADIOLOGY

## 2023-02-10 PROCEDURE — 93010 ELECTROCARDIOGRAM REPORT: CPT | Performed by: INTERNAL MEDICINE

## 2023-02-10 PROCEDURE — 71045 X-RAY EXAM CHEST 1 VIEW: CPT

## 2023-02-10 PROCEDURE — 83880 ASSAY OF NATRIURETIC PEPTIDE: CPT

## 2023-02-10 PROCEDURE — APPSS60 APP SPLIT SHARED TIME 46-60 MINUTES: Performed by: NURSE PRACTITIONER

## 2023-02-10 PROCEDURE — 6370000000 HC RX 637 (ALT 250 FOR IP): Performed by: NURSE PRACTITIONER

## 2023-02-10 PROCEDURE — 84443 ASSAY THYROID STIM HORMONE: CPT

## 2023-02-10 RX ORDER — POLYETHYLENE GLYCOL 3350 17 G/17G
17 POWDER, FOR SOLUTION ORAL DAILY PRN
Status: DISCONTINUED | OUTPATIENT
Start: 2023-02-10 | End: 2023-02-15 | Stop reason: HOSPADM

## 2023-02-10 RX ORDER — FUROSEMIDE 40 MG/1
40 TABLET ORAL DAILY
Status: DISCONTINUED | OUTPATIENT
Start: 2023-02-10 | End: 2023-02-15 | Stop reason: HOSPADM

## 2023-02-10 RX ORDER — LACTULOSE 10 G/15ML
20 SOLUTION ORAL ONCE
Status: COMPLETED | OUTPATIENT
Start: 2023-02-10 | End: 2023-02-10

## 2023-02-10 RX ORDER — SODIUM CHLORIDE 0.9 % (FLUSH) 0.9 %
5-40 SYRINGE (ML) INJECTION PRN
Status: DISCONTINUED | OUTPATIENT
Start: 2023-02-10 | End: 2023-02-15 | Stop reason: HOSPADM

## 2023-02-10 RX ORDER — PANTOPRAZOLE SODIUM 40 MG/10ML
40 INJECTION, POWDER, LYOPHILIZED, FOR SOLUTION INTRAVENOUS DAILY
Status: DISCONTINUED | OUTPATIENT
Start: 2023-02-10 | End: 2023-02-12

## 2023-02-10 RX ORDER — SODIUM CHLORIDE 9 MG/ML
INJECTION, SOLUTION INTRAVENOUS PRN
Status: DISCONTINUED | OUTPATIENT
Start: 2023-02-10 | End: 2023-02-15 | Stop reason: HOSPADM

## 2023-02-10 RX ORDER — ACETAMINOPHEN 325 MG/1
650 TABLET ORAL EVERY 6 HOURS PRN
Status: DISCONTINUED | OUTPATIENT
Start: 2023-02-10 | End: 2023-02-15 | Stop reason: HOSPADM

## 2023-02-10 RX ORDER — ONDANSETRON 4 MG/1
4 TABLET, ORALLY DISINTEGRATING ORAL EVERY 8 HOURS PRN
Status: DISCONTINUED | OUTPATIENT
Start: 2023-02-10 | End: 2023-02-15 | Stop reason: HOSPADM

## 2023-02-10 RX ORDER — SODIUM CHLORIDE 0.9 % (FLUSH) 0.9 %
5-40 SYRINGE (ML) INJECTION EVERY 12 HOURS SCHEDULED
Status: DISCONTINUED | OUTPATIENT
Start: 2023-02-10 | End: 2023-02-15 | Stop reason: HOSPADM

## 2023-02-10 RX ORDER — ONDANSETRON 2 MG/ML
4 INJECTION INTRAMUSCULAR; INTRAVENOUS EVERY 6 HOURS PRN
Status: DISCONTINUED | OUTPATIENT
Start: 2023-02-10 | End: 2023-02-15 | Stop reason: HOSPADM

## 2023-02-10 RX ORDER — ACETAMINOPHEN 650 MG/1
650 SUPPOSITORY RECTAL EVERY 6 HOURS PRN
Status: DISCONTINUED | OUTPATIENT
Start: 2023-02-10 | End: 2023-02-15 | Stop reason: HOSPADM

## 2023-02-10 RX ORDER — LACTULOSE 10 G/15ML
20 SOLUTION ORAL 3 TIMES DAILY
Status: DISCONTINUED | OUTPATIENT
Start: 2023-02-10 | End: 2023-02-15 | Stop reason: HOSPADM

## 2023-02-10 RX ORDER — LEVOTHYROXINE SODIUM 0.05 MG/1
50 TABLET ORAL DAILY
Status: DISCONTINUED | OUTPATIENT
Start: 2023-02-10 | End: 2023-02-15 | Stop reason: HOSPADM

## 2023-02-10 RX ORDER — SPIRONOLACTONE 50 MG/1
100 TABLET, FILM COATED ORAL 2 TIMES DAILY
Status: DISCONTINUED | OUTPATIENT
Start: 2023-02-10 | End: 2023-02-15 | Stop reason: HOSPADM

## 2023-02-10 RX ORDER — NADOLOL 20 MG/1
40 TABLET ORAL NIGHTLY
Status: DISCONTINUED | OUTPATIENT
Start: 2023-02-10 | End: 2023-02-15 | Stop reason: HOSPADM

## 2023-02-10 RX ORDER — ENOXAPARIN SODIUM 100 MG/ML
40 INJECTION SUBCUTANEOUS DAILY
Status: DISCONTINUED | OUTPATIENT
Start: 2023-02-10 | End: 2023-02-15 | Stop reason: HOSPADM

## 2023-02-10 RX ADMIN — FUROSEMIDE 40 MG: 40 TABLET ORAL at 21:45

## 2023-02-10 RX ADMIN — PANTOPRAZOLE SODIUM 40 MG: 40 INJECTION, POWDER, LYOPHILIZED, FOR SOLUTION INTRAVENOUS at 21:45

## 2023-02-10 RX ADMIN — ENOXAPARIN SODIUM 40 MG: 100 INJECTION SUBCUTANEOUS at 17:59

## 2023-02-10 RX ADMIN — SODIUM CHLORIDE, PRESERVATIVE FREE 10 ML: 5 INJECTION INTRAVENOUS at 21:46

## 2023-02-10 RX ADMIN — LACTULOSE 20 G: 10 SOLUTION ORAL at 21:45

## 2023-02-10 RX ADMIN — LACTULOSE 20 G: 10 SOLUTION ORAL at 13:44

## 2023-02-10 RX ADMIN — SPIRONOLACTONE 100 MG: 50 TABLET ORAL at 21:45

## 2023-02-10 RX ADMIN — VANCOMYCIN HYDROCHLORIDE 1000 MG: 1 INJECTION, POWDER, LYOPHILIZED, FOR SOLUTION INTRAVENOUS at 22:27

## 2023-02-10 RX ADMIN — RIFAXIMIN 550 MG: 550 TABLET ORAL at 21:45

## 2023-02-10 NOTE — ED NOTES
1101 perfect serve message sent to Dr Ivelisse Philip on in pt consult     Eric Stevens  02/10/23 9035    0109 Dr Ivelisse Philip acknowledged perfect serve message.  Added to treatment team.      Eric Stevens  02/10/23 4276

## 2023-02-10 NOTE — FLOWSHEET NOTE
4 Eyes Skin Assessment     NAME:  Jovany Westbrook OF BIRTH:  1945  MEDICAL RECORD NUMBER:  [id number]    The patient is being assessed for  {Reason for Assessment:42418}    I agree that One RN has performed a thorough Head to Toe Skin Assessment on the patient. ALL assessment sites listed below have been assessed. Areas assessed by both nurses:    {Pressure Areas Assessed:74825}        Does the Patient have a Wound? {Action Wound:27161}       Andre Prevention initiated by RN: {YES/NO:19732}   Wound Care Orders initiated by RN: {YES/NO:19732}    Pressure Injury (Stage 3,4, Unstageable, DTI, NWPT, and Complex wounds) if present, place referral order by RN under : {YES/NO:19732}    New and Established Ostomies, if present place, referral order under : {YES/NO:19732}      Nurse 1 eSignature: {Esignature:[id number]}    **SHARE this note so that the co-signing nurse can place an eSignature**    Nurse 2 eSignature: {Esignature:[id number]}      4 Eyes Skin Assessment     NAME:  Jovany Westbrook OF BIRTH:  1945  MEDICAL RECORD NUMBER:  [id number]    The patient is being assessed for  Admission    I agree that One RN has performed a thorough Head to Toe Skin Assessment on the patient. ALL assessment sites listed below have been assessed. Areas assessed by both nurses:    Head, Face, Ears, Shoulders, Back, Chest, Arms, Elbows, Hands, Sacrum. Buttock, Coccyx, Ischium, and Legs. Feet and Heels        Does the Patient have a Wound?  No noted wound(s)       Andre Prevention initiated by RN: Yes   Wound Care Orders initiated by RN: No    Pressure Injury (Stage 3,4, Unstageable, DTI, NWPT, and Complex wounds) if present, place referral order by RN under : No    New and Established Ostomies, if present place, referral order under : No      Nurse 1 eSignature: Electronically signed by Dinorah Drummond RN on 2/10/23 at 2:39 PM EST    **SHARE this note so that the co-signing nurse can place an eSignature**    Nurse 2 eSignature: Electronically signed by Mehnaz Blakely RN on 2/10/23 at 5:18 PM Katy Meza

## 2023-02-10 NOTE — CONSULTS
Infectious Disease Consult Note  2/10/2023   Patient Name: Herman Maxwell : 1945   Impression  Currently on IV ABX for Native TV Staphylococcus epidermidis Endocarditis:  Possible Vancomycin Toxicity:   Possible Gastritis:  Afebrile, no leukocytosis  Hypoalbuminemia:  CrCl 35  Vanco troughs elevated: -31.8, -27.0  2/10-BC Pending  2/10-CT A&P WO Contrast: Incomplete visualization moderate-large right pleural effusion,   moderate-large volume ascites and findings consistent with underlying hepatic cirrhosis. Incomplete visualization 2 foci masslike pulmonary consolidation right lung measuring 4 cm in greatest dimension consistent with malignancy or round atelectasis. Incomplete visualization mediastinal lymphadenopathy similar to   prior studies dating back to 2022 with maximal short axis dimension 1.3   cm. Diffuse mucosal thickening throughout the stomach which may reflect   incomplete distention, portal gastropathy diffuse gastritis. Finding is new/worse compared to 2023. Endoscopic evaluation would be helpful for further evaluation if clinically indicated. Diffuse mesenteric lymphadenopathy similar to prior studies dating back to   2022 with maximal short axis dimension of 12 mm. Diffuse infiltrative changes subcutaneous soft tissues right abdominal wall,   diffuse fatty infiltrative change right abdominal wall musculature. No   subcutaneous gas density or drainable fluid collection. Findings are   consistent with contusion, edema, cellulitis, history of multiple paracenteses. Correlation with clinical findings suggested.    Gynecomastia and stable subcutaneous 1.4 cm nodule medial aspect of the right   breast compared to prior studies dating back to 2022   2/10-S/p IR paracentesis:  cultures Pending  De-Compensated Alcoholic Cirrhosis with Ascites and Acute Hepatic Encephalopathy:  Dr. Lane Thakur, has been consulted  CANDIE on CKD:  Currently Cr 2.1 with baseline of 1. 3  JENNIFER:  Pulmonary Nodules:  HTN/ HFpEF  Hearing Impaired:  Obesity:  BMI: 33.15  Multi-morbidity: per PMHx    Plan:  Continue IV vancomycin per pharmacy dosing, orders placed  Trend CRP and Pct, order  Await for paracentesis cultures from 2/10  Await BC from 2/10  Reviewed recent vancomycin troughs of 27 and 31.8, vancomycin is on hold, pharmacy will dose    Thank you for allowing me to consult in the care of this patient.  ------------------------  REASON FOR CONSULT: Infective syndrome \"prior staph epi bacteremia on IV vanco\"  Requested by: Juwan Del Angel. Devon Meyer is a 78 y.o.  male with a history of alcoholic liver cirrhosis with portal HTN, HTN, hypothyroidism, and morbid obesity, and was just hospitalized from 1/28-2/3/2023 and diagnosed with Staphylococcus epidermidis bacteremia from bacterial peritonitis with native TV endocarditis completing a 6 week course of vancomycin IV due to end on 3/13/2023 who was admitted 2/10/2023 for further evaluation and management of AMS which was a sudden onset this am.  His wife reports he has been compliant with his po lactulose. He just had a paracentesis on 2/7/23 removing 6 L. He continues on IV vancomycin per pharmacy dosing. A CT of the head WO Contrast revealed no acute intracranial pathology or post-traumatic changes. A CT A&P WO Contrast revealed a moderate-large right pleural effusion, moderate-large volume ascites and findings consistent with underlying hepatic cirrhosis. See full report below. He is undergoing a paracentesis today, 2/10/23. An NGT was placed for gastric distention. ? Infectious diseases service was consulted to evaluate the pt, and recommend further investigative and therapeutic measures. ROS: Other systems reviewed Including eyes, ENT, respiratory, cardiovascular, GI, , dermatologic, neurologic, psych, hem/lymphatic, musculoskeletal and endocrine were negative other than what is mentioned above.      Patient Active Problem List    Diagnosis Date Noted    Chronic heart failure, unspecified heart failure type ([address] 50/43/5018    Alcoholic cirrhosis of liver with ascites ([address] 01/31/2023    Coagulase negative Staphylococcus bacteremia 01/31/2023    Stage 3 chronic kidney disease ([address] 01/09/2023    Bacteremia due to methicillin resistant Staphylococcus epidermidis 01/09/2023    Sleep related hypoxia 12/06/2022    AMS (altered mental status) 11/12/2022    Hypoxia 11/07/2022    Pleural effusion 10/04/2022    Recurrent pleural effusion on right 09/09/2022    Ex-smoker 08/15/2022    Obesity (BMI 30-39.9) 08/15/2022    Type 2 diabetes mellitus with chronic kidney disease 07/12/2022    Thyroid disease 06/21/2022    Cellulitis of left lower extremity 06/08/2022    Type 2 diabetes mellitus without complication, without long-term current use of insulin ([address] 06/08/2022    Generalized weakness     Oropharyngeal dysphagia     Acute kidney injury superimposed on chronic kidney disease ([address]     Acute respiratory failure ([address] 05/04/2022    Recurrent right pleural effusion 05/02/2022    Septicemia ([address] 68/85/8760    Acute metabolic encephalopathy 62/68/4758    Hearing loss 03/11/2022    Decompensation of cirrhosis of liver ([address]     Secondary esophageal varices without bleeding ([address]     Gastric polyps     SOB (shortness of breath) 09/13/2021    Portal hypertension ([address] 08/30/2021    Bilateral hearing loss 05/07/2021    Increased ammonia level 05/07/2021    Class 3 severe obesity with body mass index (BMI) of 40.0 to 44.9 in adult [facility]) 09/21/2020    Hyperglycemia 12/04/2019    Acquired hypothyroidism 12/04/2019    Pulmonary nodules 09/09/2019    Ascites due to alcoholic cirrhosis ([address] 90/14/2218    Mixed hyperlipidemia 08/08/2019    Hypertension 08/08/2019    History of alcohol abuse 08/08/2019    Gastroesophageal reflux disease 08/08/2019    Anxiety 08/08/2019    Shortness of breath 05/26/2019    History of colonic polyps 01/15/2019     Past Medical History:   Diagnosis Date    Anxiety     Cirrhosis, alcoholic [address]     Diabetes mellitus ([facility]     GERD (gastroesophageal reflux disease)     GERD (gastroesophageal reflux disease)     Hyperlipidemia     Hypertension     Portal hypertension (HCC)     Pulmonary nodules     Sleep apnea     SOB (shortness of breath)     Thyroid disease       Past Surgical History:   Procedure Laterality Date    APPENDECTOMY      CHOLECYSTECTOMY      COLONOSCOPY      ENDOSCOPY, COLON, DIAGNOSTIC      FOOT SURGERY      needle removed,not sure which foot, per wife. TONSILLECTOMY      UPPER GASTROINTESTINAL ENDOSCOPY N/A 2022    EGD BIOPSY performed by Zara Holly MD at [address] [place] [address]        Family History   Problem Relation Age of Onset    Hypertension Brother     Diabetes Other       Infectious disease related family history - not contibutory. SOCIAL HISTORY  Social History     Tobacco Use    Smoking status: Former     Packs/day: 1.00     Years: 14.00     Pack years: 14.00     Types: Cigarettes     Quit date:      Years since quittin.1    Smokeless tobacco: Never   Substance Use Topics    Alcohol use: Not Currently      Born: [place], [place]  Lives: [place] with wife  Occupation: Retired   No recent travel of significance. No recent unusual exposures. Pets: one dog, one cat  ? ALLERGIES  Allergies   Allergen Reactions    Lisinopril Swelling     Tongue swelling      Zetia [Ezetimibe] Swelling     Facial swelling    Atorvastatin     Codeine Other (See Comments)     Blood pressure drops    Hydrochlorothiazide     Hydroxyzine      Fatigue and depressed    Lexapro [Escitalopram Oxalate]      Increased depression    Pravastatin       MEDICATIONS  Reviewed and are per the chart/EMR. ?   Antibiotics:   Present:  Vancomycin 2/10-  Past:  Vancomycin -6 week duration with planned end date 3/13/2023?  -------------------------------------------------------------------------------------------------------------------    Vital Signs:  Vitals:    02/10/23 1224   BP: 117/60   Pulse: 72   Resp: 19   Temp: 97.6 °F (36.4 °C)   SpO2: 100%         Exam:    VS: noted; wt 218 lb (98.9 kg) Height 5'8\"  Gen: lethargic but oriented X3, no distress  Skin: no stigmata of endocarditis  Wounds: C/D/I   HEMT: AT/NC Oropharynx pink, moist, and without lesions or exudates; dentition in good state of repair  Eyes: PERRLA, EOMI, conjunctiva pink, sclera anicteric. Neck: Supple. Trachea midline. No LAD. Chest: no distress and CTA. Good air movement anterior breath sounds. Oxygen per NC  Heart: NSR and no MRG. Abd: soft, non-distended, no tenderness, no hepatomegaly. Normoactive bowel sounds. NGT:  Intact left nares to LIS. Ext: no clubbing, cyanosis, or edema  Neuro: Mental status intact. CN 2-12 intact and no focal sensory or motor deficits    ? Diagnostic Studies: reviewed  2/10/2023 XR Chest Portable:  Impression   Right PICC with tip in the region of the right atrium. Right upper lung consolidation consolidation remains similar to 01/23/2023. Small right pleural effusion. 2/10/2023 CT Head WO Contrast:  Impression   No noncontrast CT evidence of acute intracranial pathology or acute   posttraumatic change       Cortical atrophy, white matter changes consistent with microvascular   degenerative disease, atherosclerotic calcification distal internal carotid   and vertebral arteries of uncertain hemodynamic significance, dental tamera   right upper incisor. 2/10/2023 CT Abdomen Pelvis WO Contrast:  Impression   Incomplete visualization moderate-large right pleural effusion,   moderate-large volume ascites and findings consistent with underlying hepatic   cirrhosis.        Incomplete visualization 2 foci masslike pulmonary consolidation right lung   measuring 4 cm in greatest dimension consistent with malignancy or round   atelectasis. Incomplete visualization mediastinal lymphadenopathy similar to   prior studies dating back to 05/05/2022 with maximal short axis dimension 1.3   cm. Diffuse mucosal thickening throughout the stomach which may reflect   incomplete distention, portal gastropathy diffuse gastritis. Finding is   new/worse compared to 01/04/2023. Endoscopic evaluation would be helpful for   further evaluation if clinically indicated. Diffuse mesenteric lymphadenopathy similar to prior studies dating back to   05/05/2022 with maximal short axis dimension of 12 mm. Diffuse infiltrative changes subcutaneous soft tissues right abdominal wall,   diffuse fatty infiltrative change right abdominal wall musculature. No   subcutaneous gas density or drainable fluid collection. Findings are   consistent with contusion, edema, cellulitis, history of multiple   paracenteses. Correlation with clinical findings suggested. Gynecomastia and stable subcutaneous 1.4 cm nodule medial aspect of the right   breast compared to prior studies dating back to 05/05/2022     2/10/2023 XR Abdomen for NG Placement; Impression   Nasogastric tube tip in decompressed stomach with side port at the level of   the gastroesophageal junction. Advanced nasogastric tube by approximately 10   cm suggested to ensure adequate gastric decompression   ? ? I have examined this patient and available medical records on this date and have made the above observations, conclusions and recommendations. Electronically signed by: Electronically signed by Ayesha Rubin.  MOHINDER Hall CNP on 2/10/2023 at 1:33 PM

## 2023-02-10 NOTE — ED NOTES
Medication History  [address]    Patient Name: Gibson Seo 1945     Medication history has been completed by: Ethan Hahn CPhT    Source(s) of information: patient's wife and insurance claims     Primary Care Physician: Kimberly Ho MD     Pharmacy: [facility]    Allergies as of 02/10/2023 - Fully Reviewed 02/10/2023   Allergen Reaction Noted    Lisinopril Swelling     Zetia [ezetimibe] Swelling     Atorvastatin      Codeine Other (See Comments) 12/27/2018    Hydrochlorothiazide      Hydroxyzine      Lexapro [escitalopram oxalate]      Pravastatin          Prior to Admission medications    Medication Sig Start Date End Date Taking? Authorizing Provider   spironolactone (ALDACTONE) 100 MG tablet Take 1 tablet by mouth 2 times daily 2/8/23   Machelle Ulloa MD   rifAXIMin Stanley Imam) 550 MG tablet Take 1 tablet by mouth 2 times daily 1/11/23   Alma Rosa Erickson MD   thiamine 100 MG tablet Take 1 tablet by mouth daily  Patient taking differently: Take 100 mg by mouth daily OTC 1/12/23   Alma Rosa Erickson MD   furosemide (LASIX) 40 MG tablet Take 1 tablet by mouth daily 11/22/22   Machelle Ulloa MD   levothyroxine (SYNTHROID) 50 MCG tablet Take 1 tablet by mouth Daily 10/14/22 4/12/23  Machelle Ulloa MD   albuterol sulfate HFA (PROVENTIL;VENTOLIN;PROAIR) 108 (90 Base) MCG/ACT inhaler INHALE 2 PUFFS BY MOUTH 4 TIMES DAILY AS NEEDED FOR WHEEZING 10/14/22   Machelle Ulloa MD   DULoxetine (CYMBALTA) 60 MG extended release capsule TAKE 1 CAPSULE BY MOUTH ONCE DAILY  Patient taking differently: Take 60 mg by mouth daily TAKE 1 CAPSULE BY MOUTH ONCE DAILY 10/14/22 8/18/23  Machelle Ulloa MD   betamethasone valerate (VALISONE) 0.1 % lotion Apply topically 2 times daily prn scalp rash 10/14/22   Machelle Ulloa MD   nadolol (CORGARD) 40 MG tablet Take 1 tablet by mouth in the morning.   Patient taking differently: Take 40 mg by mouth nightly 7/20/22 2/10/23  Larrie Spray, APRN - CNP   lactulose (CHRONULAC) 10 GM/15ML solution Take 30 mLs by mouth 3 times daily Hold if having diarrhea 3/21/22   John Cevallos MD   Multiple Vitamin (MULTI VITAMIN MENS PO) Take 1 tablet by mouth daily    Historical Provider, MD   ESOMEPRAZOLE MAGNESIUM PO Take 20 mg by mouth daily OTC    Historical Provider, MD   [place]owner-3 1000 MG CAPS Take 2,000 mg by mouth daily    Historical Provider, MD     Comments:  Unable to assess patient. Medication list reviewed with patient's wife and insurance claims verified. Per wife patient took no meds prior to ER visit.     To my knowledge the above medication history is accurate as of 2/10/2023 11:49 AM.   Jerry Fuentes CPhT   2/10/2023 11:49 AM

## 2023-02-10 NOTE — OR NURSING
PROCEDURE PERFORMED: paracentesis    PRIMARY INDICATION FOR PROCEDURE: ascites    INFORMED CONSENT:  Obtained prior to procedure. Consent placed in chart. PT TRANSPORTED FROM:                                  TO THE IR ROOM: Bedside                       ASSESSMENT: Pt very drowsy in bilateral soft limb restraints. Wife @ bedside gave consent    BARRIER PRECAUTIONS & STERILE TECHNIQUE:               Pt positioned supine for comfort. Pt placed on Cardiac Monitor. Pt prepped and draped in a sterile fashion with chlorhexadine. PAIN/LOCAL ANESTHESIA/SEDATION MANAGEMENT:           Local: Lidocaine 1% given by Dr. Marianne Shaw:           ACCESS TIME: 0049          US/FLUORO: US 2 images          ACCESS USED: [employer]         STERILE DRESSINGS: guaze & tegaderm    SPECIMENS: 3125cc white pink-tinged ascitic fluid removed. 525cc sent to lab.      EBL:   <1cc       FOLLOW- UP X-RAY: None    COMPLICATIONS/ OUTCOME: VSS, tolerated well       REPORT CALLED TO: Bebe Cartagena

## 2023-02-10 NOTE — PROGRESS NOTES
Patient wellknown tome with cirrhosis, chylous refractory ascites and hepatic hydrothorax, chronic PSE,staph endocarditis admitted with hepatic encephalopathy  Full note to follow in am  Suggest:   1) agree with paracentesis to rule out SBP   2) continue lactulose-titrate to produce 2-4 BM/d   3) continue Xifaxan 550 BID

## 2023-02-10 NOTE — ED NOTES
ED TO INPATIENT SBAR HANDOFF    Patient Name: Cathy Rangel   :  1945  78 y.o. MRN:  [id number]  Preferred Name  Ansley PlascenciaZion  ED Room #:  ED33/ED-33  Family/Caregiver Present yes   Restraints no   Sitter no   Sepsis Risk Score Sepsis Risk Score: 2.61    Situation  Code Status: Prior No additional code details. Allergies: Lisinopril, Zetia [ezetimibe], Atorvastatin, Codeine, Hydrochlorothiazide, Hydroxyzine, Lexapro [escitalopram oxalate], and Pravastatin  Weight: No data found. Arrived from: home  Chief Complaint:   Chief Complaint   Patient presents with    Altered Mental Status     Hospital Problem/Diagnosis:  Principal Problem:    AMS (altered mental status)  Resolved Problems:    * No resolved hospital problems. *    Imaging:   CT ABDOMEN PELVIS WO CONTRAST Additional Contrast? None   Final Result   Incomplete visualization moderate-large right pleural effusion,   moderate-large volume ascites and findings consistent with underlying hepatic   cirrhosis. Incomplete visualization 2 foci masslike pulmonary consolidation right lung   measuring 4 cm in greatest dimension consistent with malignancy or round   atelectasis. Incomplete visualization mediastinal lymphadenopathy similar to   prior studies dating back to 2022 with maximal short axis dimension 1.3   cm. Diffuse mucosal thickening throughout the stomach which may reflect   incomplete distention, portal gastropathy diffuse gastritis. Finding is   new/worse compared to 2023. Endoscopic evaluation would be helpful for   further evaluation if clinically indicated. Diffuse mesenteric lymphadenopathy similar to prior studies dating back to   2022 with maximal short axis dimension of 12 mm. Diffuse infiltrative changes subcutaneous soft tissues right abdominal wall,   diffuse fatty infiltrative change right abdominal wall musculature. No   subcutaneous gas density or drainable fluid collection.   Findings are consistent with contusion, edema, cellulitis, history of multiple   paracenteses. Correlation with clinical findings suggested. Gynecomastia and stable subcutaneous 1.4 cm nodule medial aspect of the right   breast compared to prior studies dating back to 05/05/2022. CT HEAD WO CONTRAST   Final Result   No noncontrast CT evidence of acute intracranial pathology or acute   posttraumatic change      Cortical atrophy, white matter changes consistent with microvascular   degenerative disease, atherosclerotic calcification distal internal carotid   and vertebral arteries of uncertain hemodynamic significance, dental tamera   right upper incisor. XR CHEST PORTABLE   Final Result   Right PICC with tip in the region of the right atrium. Right upper lung consolidation consolidation remains similar to 01/23/2023. Small right pleural effusion.            Abnormal labs:   Abnormal Labs Reviewed   CBC WITH AUTO DIFFERENTIAL - Abnormal; Notable for the following components:       Result Value    RBC 3.45 (*)     Hemoglobin 10.8 (*)     Hematocrit 32.3 (*)     MCH 31.3 (*)     Segs Relative 68.7 (*)     Lymphocytes % 10.6 (*)     Monocytes % 12.2 (*)     Eosinophils % 7.2 (*)     All other components within normal limits   COMPREHENSIVE METABOLIC PANEL - Abnormal; Notable for the following components:    BUN 40 (*)     Creatinine 2.1 (*)     Est, Glom Filt Rate 32 (*)     Glucose 108 (*)     Albumin 2.9 (*)     All other components within normal limits   BLOOD GAS, VENOUS - Abnormal; Notable for the following components:    pH, Jamaal 7.44 (*)     pO2, Jamaal 111 (*)     Base Exc, Mixed 3.3 (*)     HCO3, Venous 27.8 (*)     O2 Sat, Jamaal 95.5 (*)     All other components within normal limits   AMMONIA - Abnormal; Notable for the following components:    Ammonia 125 (*)     All other components within normal limits   TROPONIN - Abnormal; Notable for the following components:    Troponin T 0.020 (*)     All other components within normal limits   BRAIN NATRIURETIC PEPTIDE - Abnormal; Notable for the following components:    Pro-.4 (*)     All other components within normal limits     Critical values: yes     Abnormal Assessment Findings: altered mental status    Background  History:   Past Medical History:   Diagnosis Date    Anxiety     Cirrhosis, alcoholic (HCC)     Diabetes mellitus ([address]     GERD (gastroesophageal reflux disease) 2000    GERD (gastroesophageal reflux disease)     Hyperlipidemia     Hypertension     Portal hypertension (HCC)     Pulmonary nodules     Sleep apnea     SOB (shortness of breath)     Thyroid disease        Assessment    Vitals/MEWS: MEWS Score: 1  Level of Consciousness: Alert (0)   Vitals:    02/10/23 0822   BP: (!) 127/99   Pulse: 67   Resp: 18   Temp: 97.8 °F (36.6 °C)   TempSrc: Oral   SpO2: 100%     FiO2 (%): nasal cannula  O2 Flow Rate:    2  Cardiac Rhythm: NSR  Pain Assessment:  [] Verbal [] Lily Dredge Scale  Pain Scale:    Last documented pain score (0-10 scale)    Last documented pain medication administered:   Mental Status: disoriented  NIH Score: NIH     C-SSRS: Risk of Suicide: No Risk  Bedside swallow: Fail  Hali Coma Scale (GCS): Hali Coma Scale  Eye Opening: Spontaneous  Best Verbal Response: Confused  Best Motor Response: Localizes pain  Hali Coma Scale Score: 13  Active LDA's:    PO Status: Nothing by Mouth  Pertinent or High Risk Medications/Drips: no   If Yes, please provide details:   Pending Blood Product Administration: no     You may also review the ED PT Care Timeline found under the Summary Nursing Index tab. Recommendation    Pending orders NG tube  Plan for Discharge (if known):    Additional Comments:    If any further questions, please call Sending RN at [phone]    Electronically signed by: Electronically signed by Shari Asher RN on 2/10/2023 at 10:44 AM      Gibran Concepcion RN  02/10/23 [phone]

## 2023-02-10 NOTE — ED PROVIDER NOTES
[facility]      TRIAGE CHIEF COMPLAINT:   Altered Mental Status      [place]:  Edgard Cain is a 78 y.o. male that presents by EMS for complaint of altered mental status. Patient apparently has a history of eating recent paracentesis apparently gets confused when his lab values are off. He arrives by EMS on arrival he is awake however he is confused he is not talking to me he is moving all extremities he is in no distress. .  No further HPI available no family present initially. REVIEW OF SYSTEMS:      Review of Systems   Unable to perform ROS: Mental status change   Psychiatric/Behavioral:  Positive for confusion. Past Medical History:   Diagnosis Date    Anxiety     Cirrhosis, alcoholic ([facility]     Diabetes mellitus ([facility]     GERD (gastroesophageal reflux disease)     GERD (gastroesophageal reflux disease)     Hyperlipidemia     Hypertension     Portal hypertension (HCC)     Pulmonary nodules     Sleep apnea     SOB (shortness of breath)     Thyroid disease      Past Surgical History:   Procedure Laterality Date    APPENDECTOMY      CHOLECYSTECTOMY      COLONOSCOPY      ENDOSCOPY, COLON, DIAGNOSTIC      FOOT SURGERY      needle removed,not sure which foot, per wife.     TONSILLECTOMY      UPPER GASTROINTESTINAL ENDOSCOPY N/A 2022    EGD BIOPSY performed by Sandee Hall MD at [address] [place] [address]       Family History   Problem Relation Age of Onset    Hypertension Brother     Diabetes Other      Social History     Socioeconomic History    Marital status:      Spouse name: Not on file    Number of children: Not on file    Years of education: Not on file    Highest education level: Not on file   Occupational History     Comment: Retired    Tobacco Use    Smoking status: Former     Packs/day: 1.00     Years: 14.00     Pack years: 14.00     Types: Cigarettes     Quit date:      Years since quittin.1    Smokeless tobacco: Never   Vaping Use Vaping Use: Never used   Substance and Sexual Activity    Alcohol use: Not Currently    Drug use: Never    Sexual activity: Yes     Partners: Female   Other Topics Concern    Not on file   Social History Narrative    Not on file     Social Determinants of Health     Financial Resource Strain: Low Risk     Difficulty of Paying Living Expenses: Not hard at all   Food Insecurity: No Food Insecurity    Worried About Running Out of Food in the Last Year: Never true    Ran Out of Food in the Last Year: Never true   Transportation Needs: Not on file   Physical Activity: Not on file   Stress: Not on file   Social Connections: Not on file   Intimate Partner Violence: Not on file   Housing Stability: Not on file     Current Facility-Administered Medications   Medication Dose Route Frequency Provider Last Rate Last Admin    lactulose ([employer]) 10 GM/15ML solution 20 g  20 g Oral Once Bryan Hunter, DO         Current Outpatient Medications   Medication Sig Dispense Refill    spironolactone (ALDACTONE) 100 MG tablet Take 1 tablet by mouth 2 times daily 30 tablet 2    rifAXIMin (XIFAXAN) 550 MG tablet Take 1 tablet by mouth 2 times daily 42 tablet 0    thiamine 100 MG tablet Take 1 tablet by mouth daily 30 tablet 3    furosemide (LASIX) 40 MG tablet Take 1 tablet by mouth daily 60 tablet 3    levothyroxine (SYNTHROID) 50 MCG tablet Take 1 tablet by mouth Daily 90 tablet 1    albuterol sulfate HFA (PROVENTIL;VENTOLIN;PROAIR) 108 (90 Base) MCG/ACT inhaler INHALE 2 PUFFS BY MOUTH 4 TIMES DAILY AS NEEDED FOR WHEEZING 1 each 2    DULoxetine (CYMBALTA) 60 MG extended release capsule TAKE 1 CAPSULE BY MOUTH ONCE DAILY 90 capsule 1    betamethasone valerate (VALISONE) 0.1 % lotion Apply topically 2 times daily prn scalp rash 60 mL 3    nadolol (CORGARD) 40 MG tablet Take 1 tablet by mouth in the morning.  (Patient taking differently: Take 40 mg by mouth nightly) 90 tablet 3    lactulose (CHRONULAC) 10 GM/15ML solution Take 30 mLs by mouth 3 times daily Hold if having diarrhea 2700 mL 5    Multiple Vitamin (MULTI VITAMIN MENS PO) Take 1 tablet by mouth daily      ESOMEPRAZOLE MAGNESIUM PO Take 20 mg by mouth daily OTC      Omega-3 1000 MG CAPS Take 2,000 mg by mouth daily        Allergies   Allergen Reactions    Lisinopril Swelling     Tongue swelling      Zetia [Ezetimibe] Swelling     Facial swelling    Atorvastatin     Codeine Other (See Comments)     Blood pressure drops    Hydrochlorothiazide     Hydroxyzine      Fatigue and depressed    Lexapro [Escitalopram Oxalate]      Increased depression    Pravastatin      Current Facility-Administered Medications   Medication Dose Route Frequency Provider Last Rate Last Admin    lactulose (CHRONULAC) 10 GM/15ML solution 20 g  20 g Oral Once [email or web address], DO         Current Outpatient Medications   Medication Sig Dispense Refill    spironolactone (ALDACTONE) 100 MG tablet Take 1 tablet by mouth 2 times daily 30 tablet 2    rifAXIMin (XIFAXAN) 550 MG tablet Take 1 tablet by mouth 2 times daily 42 tablet 0    thiamine 100 MG tablet Take 1 tablet by mouth daily 30 tablet 3    furosemide (LASIX) 40 MG tablet Take 1 tablet by mouth daily 60 tablet 3    levothyroxine (SYNTHROID) 50 MCG tablet Take 1 tablet by mouth Daily 90 tablet 1    albuterol sulfate HFA (PROVENTIL;VENTOLIN;PROAIR) 108 (90 Base) MCG/ACT inhaler INHALE 2 PUFFS BY MOUTH 4 TIMES DAILY AS NEEDED FOR WHEEZING 1 each 2    DULoxetine (CYMBALTA) 60 MG extended release capsule TAKE 1 CAPSULE BY MOUTH ONCE DAILY 90 capsule 1    betamethasone valerate (VALISONE) 0.1 % lotion Apply topically 2 times daily prn scalp rash 60 mL 3    nadolol (CORGARD) 40 MG tablet Take 1 tablet by mouth in the morning.  (Patient taking differently: Take 40 mg by mouth nightly) 90 tablet 3    lactulose (CHRONULAC) 10 GM/15ML solution Take 30 mLs by mouth 3 times daily Hold if having diarrhea 2700 mL 5    Multiple Vitamin (MULTI VITAMIN MENS PO) Take 1 tablet by mouth daily      ESOMEPRAZOLE MAGNESIUM PO Take 20 mg by mouth daily OTC      Omega-3 1000 MG CAPS Take 2,000 mg by mouth daily         Nursing Notes Reviewed    VITAL SIGNS:  ED Triage Vitals   Enc Vitals Group      BP       Pulse       Resp       Temp       Temp src       SpO2       Weight       Height       Head Circumference       Peak Flow       Pain Score       Pain Loc       Pain Edu? Excl. in [address]? PHYSICAL EXAM:  Physical Exam  Vitals and nursing note reviewed. Constitutional:       General: He is not in acute distress. Appearance: He is well-developed, well-groomed and overweight. He is ill-appearing. He is not toxic-appearing or diaphoretic. HENT:      Head: Normocephalic and atraumatic. Right Ear: External ear normal.      Left Ear: External ear normal.      Nose: No congestion or rhinorrhea. Eyes:      General: No scleral icterus. Right eye: No discharge. Left eye: No discharge. Extraocular Movements: Extraocular movements intact. Conjunctiva/sclera: Conjunctivae normal.   Cardiovascular:      Rate and Rhythm: Normal rate and regular rhythm. Pulses: Normal pulses. Heart sounds: Normal heart sounds. No murmur heard. No friction rub. No gallop. Pulmonary:      Effort: Pulmonary effort is normal. No respiratory distress. Breath sounds: Normal breath sounds. No stridor. No wheezing, rhonchi or rales. Abdominal:      General: Bowel sounds are normal. There is no distension. Palpations: Abdomen is soft. There is no mass. Tenderness: There is no abdominal tenderness. There is no guarding or rebound. Negative signs include Reynolds's sign, Rovsing's sign and McBurney's sign. Hernia: No hernia is present. Musculoskeletal:         General: No swelling, tenderness, deformity or signs of injury. Cervical back: Normal range of motion. No rigidity. Right lower leg: No edema. Left lower leg: No edema.    Skin:     General: Skin is warm. Coloration: Skin is not jaundiced or pale. Findings: No bruising, erythema, lesion or rash. Neurological:      Mental Status: He is alert. He is disoriented and confused. GCS: GCS eye subscore is 4. GCS verbal subscore is 4. GCS motor subscore is 6. Cranial Nerves: No cranial nerve deficit or facial asymmetry. Psychiatric:         Mood and Affect: Mood normal.         Behavior: Behavior is cooperative.          I have reviewed andinterpreted all of the currently available lab results from this visit (if applicable):    Results for orders placed or performed during the hospital encounter of 02/10/23   CBC with Auto Differential   Result Value Ref Range    WBC 8.2 4.0 - 10.5 K/CU MM    RBC 3.45 (L) 4.6 - 6.2 M/CU MM    Hemoglobin 10.8 (L) 13.5 - 18.0 GM/DL    Hematocrit 32.3 (L) 42 - 52 %    MCV 93.6 78 - 100 FL    MCH 31.3 (H) 27 - 31 PG    MCHC 33.4 32.0 - 36.0 %    RDW 14.4 11.7 - 14.9 %    Platelets 540 242 - 020 K/CU MM    MPV 10.4 7.5 - 11.1 FL    Differential Type AUTOMATED DIFFERENTIAL     Segs Relative 68.7 (H) 36 - 66 %    Lymphocytes % 10.6 (L) 24 - 44 %    Monocytes % 12.2 (H) 0 - 4 %    Eosinophils % 7.2 (H) 0 - 3 %    Basophils % 0.9 0 - 1 %    Segs Absolute 5.6 K/CU MM    Lymphocytes Absolute 0.9 K/CU MM    Monocytes Absolute 1.0 K/CU MM    Eosinophils Absolute 0.6 K/CU MM    Basophils Absolute 0.1 K/CU MM    Nucleated RBC % 0.0 %    Total Nucleated RBC 0.0 K/CU MM    Total Immature Neutrophil 0.03 K/CU MM    Immature Neutrophil % 0.4 0 - 0.43 %   Comprehensive Metabolic Panel   Result Value Ref Range    Sodium 136 135 - 145 MMOL/L    Potassium 4.5 3.5 - 5.1 MMOL/L    Chloride 102 99 - 110 mMol/L    CO2 25 21 - 32 MMOL/L    BUN 40 (H) 6 - 23 MG/DL    Creatinine 2.1 (H) 0.9 - 1.3 MG/DL    Est, Glom Filt Rate 32 (L) >60 mL/min/1.73m2    Glucose 108 (H) 70 - 99 MG/DL    Calcium 9.3 8.3 - 10.6 MG/DL    Albumin 2.9 (L) 3.4 - 5.0 GM/DL    Total Protein 7.1 6.4 - 8.2 GM/DL Total Bilirubin 0.5 0.0 - 1.0 MG/DL    ALT 22 10 - 40 U/L    AST 31 15 - 37 IU/L    Alkaline Phosphatase 90 40 - 129 IU/L    Anion Gap 9 4 - 16   Lipase   Result Value Ref Range    Lipase 49 13 - 60 IU/L   Lactic Acid   Result Value Ref Range    Lactate 1.1 0.5 - 1.9 mMOL/L   Blood Gas, Venous   Result Value Ref Range    pH, Jamaal 7.44 (H) 7.32 - 7.42    pCO2, Jamaal 41 38 - 52 mmHG    pO2, Jamaal 111 (H) 28 - 48 mmHG    Base Exc, Mixed 3.3 (H) 0 - 1.2    HCO3, Venous 27.8 (H) 19 - 25 MMOL/L    O2 Sat, Jamaal 95.5 (H) 50 - 70 %    Comment VBG    Ethanol   Result Value Ref Range    Alcohol Scrn <0.01 <0.01 %WT/VOL   Ammonia   Result Value Ref Range    Ammonia 125 (H) 16 - 60 UMOL/L   Magnesium   Result Value Ref Range    Magnesium 2.1 1.8 - 2.4 mg/dl   Urinalysis   Result Value Ref Range    Color, UA YELLOW YELLOW    Clarity, UA CLEAR CLEAR    Glucose, Urine NEGATIVE NEGATIVE MG/DL    Bilirubin Urine NEGATIVE NEGATIVE MG/DL    Ketones, Urine NEGATIVE NEGATIVE MG/DL    Specific Gravity, UA 1.010 1.001 - 1.035    Blood, Urine NEGATIVE NEGATIVE    pH, Urine 5.5 5.0 - 8.0    Protein, UA NEGATIVE NEGATIVE MG/DL    Urobilinogen, Urine 0.2 0.2 - 1.0 MG/DL    Nitrite Urine, Quantitative NEGATIVE NEGATIVE    Leukocyte Esterase, Urine NEGATIVE NEGATIVE    Urinalysis Comments       Microscopic exam not performed based on chemical results unless requested in original order.    CK   Result Value Ref Range    Total CK 57 38 - 174 IU/L   Protime/INR & PTT   Result Value Ref Range    Protime 12.8 11.7 - 14.5 SECONDS    INR 0.99 INDEX    aPTT 34.1 25.1 - 37.1 SECONDS   Urine Drug Screen   Result Value Ref Range    Cannabinoid Scrn, Ur NEGATIVE NEGATIVE    Amphetamines NEGATIVE NEGATIVE    Cocaine Metabolite NEGATIVE NEGATIVE    Benzodiazepine Screen, Urine NEGATIVE NEGATIVE    Barbiturate Screen, Ur NEGATIVE NEGATIVE    Opiates, Urine NEGATIVE NEGATIVE    Phencyclidine, Urine NEGATIVE NEGATIVE    Oxycodone NEGATIVE NEGATIVE   Troponin Result Value Ref Range    Troponin T 0.020 (H) <0.01 NG/ML   Brain Natriuretic Peptide   Result Value Ref Range    Pro-.4 (H) <300 PG/ML   EKG 12 Lead   Result Value Ref Range    Ventricular Rate 65 BPM    Atrial Rate 65 BPM    P-R Interval 180 ms    QRS Duration 72 ms    Q-T Interval 448 ms    QTc Calculation (Bazett) 465 ms    P Axis 37 degrees    R Axis 53 degrees    T Axis 53 degrees    Diagnosis       Normal sinus rhythm  Low voltage QRS  Borderline ECG  When compared with ECG of 30-JAN-2023 00:10,  No significant change was found          Radiographs (if obtained):  [] The following radiograph was interpreted by myself in the absence of a radiologist:  [x] Radiologist's Report Reviewed:    CT Brain, CXR, CT abd/pelv      XR CHEST (2 VW)    Result Date: 1/24/2023  EXAMINATION: TWO XRAY VIEWS OF THE CHEST 1/23/2023 2:01 pm COMPARISON: 01/06/2023 HISTORY: ORDERING SYSTEM PROVIDED HISTORY: Recurrent pleural effusion on right FINDINGS: Heart size stable. Right pleural effusion and right midlung opacities persist.  Left lung remains clear. No pneumothorax. Persistent right pleural effusion with right mid lung opacities suggesting atelectasis     CT Head W/O Contrast    Result Date: 1/28/2023  EXAMINATION: CT OF THE HEAD WITHOUT CONTRAST  1/28/2023 3:13 pm TECHNIQUE: CT of the head was performed without the administration of intravenous contrast. Automated exposure control, iterative reconstruction, and/or weight based adjustment of the mA/kV was utilized to reduce the radiation dose to as low as reasonably achievable. COMPARISON: CT 01/04/2023 HISTORY: ORDERING SYSTEM PROVIDED HISTORY: ams FINDINGS: BRAIN/VENTRICLES: There is no acute intracranial hemorrhage, mass effect or midline shift. No abnormal extra-axial fluid collection. The gray-white differentiation is maintained without evidence of an acute infarct. There is no evidence of hydrocephalus. Mild generalized parenchymal volume loss. ORBITS: The visualized portion of the orbits demonstrate no acute abnormality. SINUSES: The visualized paranasal sinuses and mastoid air cells demonstrate no acute abnormality. SOFT TISSUES/SKULL:  No acute abnormality of the visualized skull or soft tissues. No acute intracranial abnormality. CT CHEST W CONTRAST    Result Date: 1/28/2023  EXAMINATION: CT OF THE CHEST WITH CONTRAST 1/28/2023 7:51 pm TECHNIQUE: CT of the chest was performed with the administration of intravenous contrast. Multiplanar reformatted images are provided for review. Automated exposure control, iterative reconstruction, and/or weight based adjustment of the mA/kV was utilized to reduce the radiation dose to as low as reasonably achievable. COMPARISON: CT chest, abdomen, and pelvis January 4, 2023; chest x-ray January 28, 2023; CT chest October 6, 2022 HISTORY: ORDERING SYSTEM PROVIDED HISTORY: evlauate pleural effusion/PNA, CXR mentions masslike lesion in RUL TECHNOLOGIST PROVIDED HISTORY: Reason for exam:->evlauate pleural effusion/PNA, CXR mentions masslike lesion in RUL Reason for Exam: evlauate pleural effusion/PNA, CXR mentions masslike lesion in RUL FINDINGS: Mediastinum: The heart is grossly stable in size. No pericardial effusion. Mediastinal lymphadenopathy redemonstrated. Lungs/pleura: There is a large right-sided pleural effusion with associated atelectasis. 2 separate foci of masslike consolidation centered within the right upper and middle lobes which are closely associated with the pleura (image 61 series 5; image 93 series 601; image 94 series 5; image 67 series 601). Findings may reflect areas of rounded atelectasis, however, recommend close clinical follow-up to ensure resolution given the masslike appearance. Nodular density also identified posteriorly along the right major fissure (image 124 series 5 which also may reflect a focus of rounded atelectasis.  Again, recommend follow-up to resolution to exclude the possibility of underlying pulmonary nodule. The left lung is clear with no pleural effusion present. No pneumothorax identified. Upper Abdomen: Cirrhotic morphology of the liver with large volume intra-abdominal ascites present. Soft Tissues/Bones: No acute osseous or soft tissue abnormality identified. 1. 3 separate rounded masslike densities identified within the right upper and middle lobes and right lower lobe which are favored to reflect foci of rounded atelectasis. Recommend interval follow-up to ensure resolution and exclude the possibility of underlying pulmonary mass/pulmonary nodule. 2. Large right pleural effusion redemonstrated. 3. Redemonstration of cirrhotic morphology of the liver with large volume intra-abdominal ascites. 4. Nonspecific mediastinal lymphadenopathy. XR CHEST PORTABLE    Result Date: 1/30/2023  EXAMINATION: ONE XRAY VIEW OF THE CHEST 1/30/2023 12:47 pm COMPARISON: 01/28/2023. Chest CT dated 01/28/2023. HISTORY: ORDERING SYSTEM PROVIDED HISTORY: thoracentesis TECHNOLOGIST PROVIDED HISTORY: Reason for exam:->thoracentesis Reason for Exam: thoracentesis FINDINGS: The cardiac silhouette appears within normal limits. There is tracheal deviation to the right indicating right hemithorax volume loss. There is suggestion of moderate residual pleural effusion. Opacity of the right lung may reflect underlying atelectasis is seen on as suggested on the prior CT. Improved aeration of the right lung apex. No definite pneumothorax appreciated. No definite pneumothorax appreciated status post right thoracentesis. Moderate residual right pleural effusion with suspected underlying atelectatic changes of right lung.      XR CHEST PORTABLE    Result Date: 1/28/2023  EXAMINATION: ONE XRAY VIEW OF THE CHEST 1/28/2023 11:45 am COMPARISON: 01/23/2023 HISTORY: ORDERING SYSTEM PROVIDED HISTORY: Altered Mental Status TECHNOLOGIST PROVIDED HISTORY: Reason for exam:->Altered Mental Status Reason for Exam: AMS FINDINGS: Masslike opacity is again seen within the right upper lung. There is increasing density in the mid to right lower lung. Chronic interstitial opacities are seen on the left. A focal infiltrate on the left is not found. The cardial pericardial silhouette is unremarkable. No acute bony abnormality identified. Increasing opacity throughout the right hemithorax, which may reflect increasing layering effusion and/or additional airspace disease. Masslike opacity in the right upper lung again demonstrated. IR US GUIDED PARACENTESIS    Result Date: 2/7/2023  PROCEDURE: PARACENTESIS WITHOUT IMAGE GUIDANCE US ABDOMEN LIMITED 2/7/2023 HISTORY: ORDERING SYSTEM PROVIDED HISTORY: Other ascites TECHNIQUE: Informed consent was obtained after a detailed explanation of the procedure including risks, benefits, and alternatives. Universal protocol was followed. A limited ultrasound of the abdomen was performed. The right abdomen was prepped and draped in sterile fashion and local anesthesia was achieved with lidocaine. An 8 Italian needle sheath was advanced into ascites and paracentesis was performed. The patient tolerated the procedure well. FINDINGS: Limited ultrasound of the abdomen demonstrates ascites. A total of 6 L was removed. Successful paracentesis. IR US GUIDED PARACENTESIS    Result Date: 1/30/2023  PROCEDURE: PARACENTESIS WITHOUT IMAGE GUIDANCE US ABDOMEN LIMITED 1/30/2023 HISTORY: ORDERING SYSTEM PROVIDED HISTORY: paracentesis TECHNOLOGIST PROVIDED HISTORY: Paracentesis See IP consult Reason for exam:->paracentesis TECHNIQUE: Maximum sterile barrier technique including hand hygiene, skin prep and sterile ultrasound technique utilized for procedure. Sterile ultrasound technique also utilized for procedure Ultrasound guidance required for procedure to confirm presence of ascites, puncture site selection, real-time intra procedural guidance.   Images made for patient's medical file.  Informed consent was obtained after a detailed explanation of the procedure including risks, benefits, and alternatives. Universal protocol was followed. A limited ultrasound of the abdomen was performed. The right abdomen was prepped and draped in sterile fashion and local anesthesia was achieved with lidocaine. An 6 Swedish needle sheath was advanced into ascites and paracentesis was performed. The patient tolerated the procedure well. FINDINGS: Limited ultrasound of the abdomen demonstrates ascites with paracentesis catheter within it. A total of 5004 mL milky/chylous appearing ascitic fluid was removed. 37.5 g intravenous albumin utilized for procedure. No complication suggested. Successful ultrasound-guided paracentesis. IR US GUIDED PARACENTESIS W IMAGING    Result Date: 1/24/2023  PROCEDURE: PARACENTESIS WITHOUT IMAGE GUIDANCE US ABDOMEN LIMITED 1/24/2023 HISTORY: ORDERING SYSTEM PROVIDED HISTORY: Cirrhosis of liver with ascites, unspecified hepatic cirrhosis type [facility]) TECHNOLOGIST PROVIDED HISTORY: Remove as much fluid as possible; please send fluid for:  cell count and diff, culture and sensitivity. Please give 6-8 grams of Albumin IV for each liter of fluid removed. Please inoculate fluid immediately into aerobic and anaerobic blood culture bottles for the culture specimen. Reason for exam:->persistent ascites secondary to decompensated cirrhosis TECHNIQUE: Maximum sterile barrier technique including hand hygiene, skin prep and sterile ultrasound technique utilized for procedure. Sterile ultrasound technique also utilized for procedure Ultrasound guidance required for procedure to confirm presence of ascites, puncture site selection, real-time intra procedural guidance. Images made for patient's medical file. Informed consent was obtained after a detailed explanation of the procedure including risks, benefits, and alternatives. Universal protocol was followed.   A limited ultrasound of the abdomen was performed. The right abdomen was prepped and draped in sterile fashion and local anesthesia was achieved with lidocaine. An 6 Bulgarian needle sheath was advanced into ascites and paracentesis was performed. The patient tolerated the procedure well. FINDINGS: Limited ultrasound of the abdomen demonstrates ascites with paracentesis catheter. [place] of 33309 cc cloudy yellow fluid was removed. 1000 cc specimen sent for laboratory evaluation. 75 g intravenous albumin utilized for procedure. Successful ultrasound-guided paracentesis with specimen sent for laboratory evaluation. EKG (if obtained): (All EKG's are interpreted by myself in the absence of a cardiologist)    12 lead EKG per my interpretation:  Normal Sinus Rhythm 65  Axis is   Normal  QTc is   465  There is no specific T wave changes appreciated. There is no specific ST wave changes appreciated. Prior EKG to compare with was not available         MDM:    Patient with complaint of altered mental status. Came by EMS. Apparently patient has altered mental status when his lab values are off. Patient had recent history of paracentesis. He arrives by EMS he is wearing oxygen he is in no distress but is confused. No family present. He is moving all extremities. Nosigns of trauma. Will check labs, imaging of head chest abdomen pelvis we will check ammonia level lactic etc.  Patient likely needs admission patient recheck doing well. So far labs imaging negative for pleural effusions, ascites, ammonia level elevated 125. Did order lactulose. Patient be admitted to hospital medicine, talk to otherwise stable. Admitted. CLINICAL IMPRESSION:  Final diagnoses:    Altered mental status, unspecified altered mental status type   Pleural effusion   Cirrhosis of liver with ascites, unspecified hepatic cirrhosis type ([address]   Increased ammonia level       (Please note that portions of this note may have been completed with a voice recognition program. Efforts were made to edit the dictations but occasionally words aremis-transcribed.)    DISPOSITION REFERRAL (if applicable):  No follow-up provider specified.     DISPOSITION MEDICATIONS (if applicable):  New Prescriptions    No medications on file          Ash Sotomayor, [address] [facility], DO  02/10/23 1031

## 2023-02-10 NOTE — CARE COORDINATION
[facility] criteria for AMS reviewed at this time, criteria supports Inpatient Admission. TAHIRA,RN/CM

## 2023-02-10 NOTE — H&P
History and Physical      Name:  Aleksandra Officer /Age/Sex: 1945  (78 y.o. male)   MRN & CSN:  [id number] & [id number] Admission Date/Time: 2/10/2023  8:09 AM   Location:  [facility] PCP: James Cade MD       Hospital Day: 1           Assessment and Plan:   # Acute metabolic encephalopathy in setting of hyperammoniemia and recent Staph Epi bacteremia secondary to bacterial peritonitis  -Patient discharged on 2/3/2023 on IV Vancomycin via PICC however due to elevated trough Vanco has been on hold since 2023. CT of the head nonacute. Patient started on lactulose in ED  -We will continue management of elevated ammonia levels, repeat ammonia levels, neuro checks, aspiration\fall precautions, check folate B12 TSH, consulted ID for antibiotic management, repeat blood cultures    # Recent Staph Epi bacteremia secondary to bacterial peritonitis -  -Discharged on IV vancomycin as noted. Check trough level. Patient afebrile, WBC within normal limits, lactic acid 1.1. ID consult for antibiotic management, repeat blood cultures x2, IR consult for diagnostic paracentesis, repeat CBC in a.m. 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 Patient followed by GI as outpatient, has follow-up at [facility] clinic . Most recent paracentesis to 2023 for 6 L. CT abdomen pelvis noted for moderate to large amount of ascites. Will consult GI to follow, continue lactulose, rifaximin, Aldactone and Lasix will follow-up diagnostic paracentesis,  avoiding nsaids and hepatotoxic agents,f/u GI eval/recs    # Acute kidney injury on CKD stage III -most recent creatinine 1.823  suspect due to hepatorenal component. Will check postvoid residual, avoid nephrotoxins, IV contrast studies, NSAIDs, keep MAP greater than 65, if worsening noted consider nephrology consultation, repeat RFP in a.m.     # Elevated trop-elevated troponin in setting of renal disease and chronic elevation. No hx of CAD. Will trend troponins if troponins continue to rise we will consult Cardiology. Unable to assess whether patient has chest pain. EKG shows no acute ischemia. Patient had preserved EF on recent TTE 1/31/2023 with no regional wall motion abnormalities, monitor on telemetry    Other chronic medical conditions-resume home medications as contraindicated  # JENNIFER/Chronic respiratory failure on O2 2l per nc-on recent admission patient was managed on BiPAP nightly and as needed, per pts wife pt is not on home BIPAP, continue O2 @ 2l per nc, monitor pulse ox  # Essential hypertension -currently on naldalol and diuretics, BP stable  # Acquired hypothyroidism-resume levothyroxine, f/u tsh  # History right hydrothorax status post right thoracentesis on recent hospitalization. Monitor need for repeat thoracentesis. Consider pulm consultation  # GERD on ppi    Present on Admission:   AMS (altered mental status)             Diet No diet orders on file   DVT Prophylaxis [x] Lovenox, []  Heparin, [] SCDs, [] Ambulation  [] Long term AC   GI Prophylaxis [] PPI,  [x] H2 Blocker,  [] Carafate,  [] Diet/Tube Feeds   Code Status Full code    Disposition Admit to inpt.     Patient plans to return home upon discharge  Expected length of stay 2-3 days       -Patient assessment and plan discussed and reviewed with admitting physician: Marie Chinchilla MD.       Chief Complaint: Altered Mental Status      History obtained from EHR wife     History of Present Illness:   Gibson Seo is a 78 y.o. male  with history of alcoholic liver cirrhosis with ascites requiring paracentesis, essential hypertension, hyperlipidemia, JENNIFER, pulmonary nodules, GERD, recent hospitalization 1/28/2023 to 2/3/2023 for acute metabolic encephalopathy in setting of decompensated liver cirrhosis and Staph Epi bacteremia secondary to bacterial peritonitis among other co-morbidities who presents with altered mental status. The patient was discharged on IV Vancomycin via PICC line. Per family his IV vancomycin has been on hold since 2/7/2023 due to elevated trough levels. She states he has been doing well since discharge until last evening. He reported he was not feeling well with generalized complaints. She reports this morning she had difficulty arousing him and states he has similar issues with elevated ammonia levels. No reports of fever or chills cough. He had been eating and drinking well; no reports of nausea vomiting or diarrhea. She reports he has been compliant with his diuretics and lactulose/rifaximin. His last paracentesis was 2/7/2023 for which 6 L peritoneal fluid removed. No other issues reported. He presented to the emergency department lethargic, hemodynamically stable afebrile, O2 sat 100% on room air. CT of the head showed no intracranial acute pathology. CT of the abdomen pelvis show moderate to large right pleural effusion, moderate to large volume ascites consistent with hepatic cirrhosis; other findings noted below. Chest x-ray reviewed. EKG showed normal sinus rhythm, low voltage and with nonspecific T wave abnormality, troponin 0.020, . Chemistry panel showed a BUN of 40 creatinine 2.1 GFR 32, glucose 108 otherwise unremarkable. Ammonia level 125. LFTs showed albumin 2.9 otherwise unremarkable LFTs. PT 12.8 INR 0.9. Urinalysis negative for infection. The patient was ordered lactulose in ED. He ha been admitted for further management. Ten point ROS: reviewed and are negative, unless as noted in above HPI. Objective:   No intake or output data in the 24 hours ending 02/10/23 1112     Vitals:   Vitals:    02/10/23 0822   BP: (!) 127/99   Pulse: 67   Resp: 18   Temp: 97.8 °F (36.6 °C)   TempSrc: Oral   SpO2: 100%       Physical Exam: 02/10/23     GEN -lethargic appearing male, in NAD. Appears given age. EYES -anicteric, conjunctiva pink.   HENT -Head is normocephalic, atraumatic. MM are moist.   NECK -Supple, no apparent thyromegaly or masses. RESP -poor inspiratory effort, few fine crackles in bases, resp unlabored. Symmetric chest movement   C/V -S1/S2 auscultated. RRR without appreciable M/R/G. No JVD. Cap refill <3 sec. BLE edema present. GI -Abdomen is soft +ascites, some grimacing with palpation, . + BS. No masses or guarding.  -No CVA/ flank tenderness. LYMPH- No petechiae or ecchymoses. MS -No gross joint deformities. SKIN -Normal coloration, warm, dry. NEURO-unable to fully eval, pt lethargic, not able to follow commands  PSYC-lethargic. Past Medical History:      Past Medical History:   Diagnosis Date    Anxiety     Cirrhosis, alcoholic ([facility]     Diabetes mellitus ([facility]     GERD (gastroesophageal reflux disease)     GERD (gastroesophageal reflux disease)     Hyperlipidemia     Hypertension     Portal hypertension ([facility])     Pulmonary nodules     Sleep apnea     SOB (shortness of breath)     Thyroid disease        PM reviewed  Past Surgical  History:    has a past surgical history that includes Cholecystectomy; Vasectomy; Colonoscopy; Endoscopy, colon, diagnostic; Foot surgery; Upper gastrointestinal endoscopy (N/A, 2022); Tonsillectomy; and Appendectomy. Surgical history reviewed  Family  History:   family history includes Diabetes in an other family member; Hypertension in his brother.       Family history reviewed  Social History:     Social History     Socioeconomic History    Marital status:      Spouse name: None    Number of children: None    Years of education: None    Highest education level: None   Occupational History     Comment: Retired    Tobacco Use    Smoking status: Former     Packs/day: 1.00     Years: 14.00     Pack years: 14.00     Types: Cigarettes     Quit date:      Years since quittin.1    Smokeless tobacco: Never   Vaping Use    Vaping Use: Never used   Substance and Sexual Activity    Alcohol use: Not Currently    Drug use: Never    Sexual activity: Yes     Partners: Female     Social Determinants of Health     Financial Resource Strain: Low Risk     Difficulty of Paying Living Expenses: Not hard at all   Food Insecurity: No Food Insecurity    Worried About Running Out of Food in the Last Year: Never true    Ran Out of Food in the Last Year: Never true      reports that he quit smoking about 47 years ago. His smoking use included cigarettes. He has a 14.00 pack-year smoking history. He has never used smokeless tobacco.   reports that he does not currently use alcohol. reports no history of drug use. Social history reviewed  Allergies: Allergies   Allergen Reactions    Lisinopril Swelling     Tongue swelling      Zetia [Ezetimibe] Swelling     Facial swelling    Atorvastatin     Codeine Other (See Comments)     Blood pressure drops    Hydrochlorothiazide     Hydroxyzine      Fatigue and depressed    Lexapro [Escitalopram Oxalate]      Increased depression    Pravastatin        Home Medications:     Prior to Admission medications    Medication Sig Start Date End Date Taking?  Authorizing Provider   spironolactone (ALDACTONE) 100 MG tablet Take 1 tablet by mouth 2 times daily 2/8/23   Ulysses Overly, MD   rifAXIMin Berna Blonder) 550 MG tablet Take 1 tablet by mouth 2 times daily 1/11/23   Karlene Mercado MD   thiamine 100 MG tablet Take 1 tablet by mouth daily 1/12/23   Karlene Mercado MD   furosemide (LASIX) 40 MG tablet Take 1 tablet by mouth daily 11/22/22   Ulysses Overly, MD   levothyroxine (SYNTHROID) 50 MCG tablet Take 1 tablet by mouth Daily 10/14/22 4/12/23  Ulysses Overly, MD   albuterol sulfate HFA (PROVENTIL;VENTOLIN;PROAIR) 108 (90 Base) MCG/ACT inhaler INHALE 2 PUFFS BY MOUTH 4 TIMES DAILY AS NEEDED FOR WHEEZING 10/14/22   Ulysses Overly, MD   DULoxetine (CYMBALTA) 60 MG extended release capsule TAKE 1 CAPSULE BY MOUTH ONCE DAILY 10/14/22 8/18/23  Amara Burr Kirsten Weeks MD   betamethasone valerate (VALISONE) 0.1 % lotion Apply topically 2 times daily prn scalp rash 10/14/22   Gordon Meredith MD   nadolol (CORGARD) 40 MG tablet Take 1 tablet by mouth in the morning. Patient taking differently: Take 40 mg by mouth nightly 7/20/22 2/7/23  MOHINDER Herring - CNP   lactulose [facility]) 10 GM/15ML solution Take 30 mLs by mouth 3 times daily Hold if having diarrhea 3/21/22   Juju Yan MD   Multiple Vitamin (MULTI VITAMIN MENS PO) Take 1 tablet by mouth daily    Historical Provider, MD   ESOMEPRAZOLE MAGNESIUM PO Take 20 mg by mouth daily OTC    Historical Provider, MD   [place]-3 1000 MG CAPS Take 2,000 mg by mouth daily    Historical Provider, MD         Medications:   Medications:    lactulose  20 g Oral Once      Infusions:   PRN Meds:      Data:     Laboratory this visit:  Reviewed  Recent Labs     02/10/23  0853   WBC 8.2   HGB 10.8*   HCT 32.3*         Recent Labs     02/10/23  0853      K 4.5      CO2 25   BUN 40*   CREATININE 2.1*     Recent Labs     02/10/23  0853   AST 31   ALT 22   BILITOT 0.5   ALKPHOS 90     Recent Labs     02/10/23  0853   INR 0.99         Radiology this visit:  Reviewed. CT ABDOMEN PELVIS WO CONTRAST Additional Contrast? None    Result Date: 2/10/2023  EXAMINATION: CT OF THE ABDOMEN AND PELVIS WITHOUT CONTRAST 2/10/2023 8:52 am TECHNIQUE: CT of the abdomen and pelvis was performed without the administration of intravenous contrast. Multiplanar reformatted images are provided for review. Automated exposure control, iterative reconstruction, and/or weight based adjustment of the mA/kV was utilized to reduce the radiation dose to as low as reasonably achievable.  COMPARISON: Chest abdomen pelvis CT 01/04/2023, abdomen pelvis CT 05/05/2022 HISTORY: ORDERING SYSTEM PROVIDED HISTORY: ams/recent paracentesis TECHNOLOGIST PROVIDED HISTORY: Reason for exam:->ams/recent paracentesis Additional Contrast?->None Decision Support Exception - unselect if not a suspected or confirmed emergency medical condition->Emergency Medical Condition (MA) Reason for Exam: ams/recent paracentesis FINDINGS: Lower Chest: Incomplete visualization moderate-large right pleural effusion, masslike areas of pulmonary consolidation anterior right mid lung field and posterior right basilar region, diffuse bronchovascular marking prominence throughout visualized lung fields bilaterally. Correlation with history of right lung malignancy, congestive heart failure, bilateral bronchitis/pneumonitis. Decompressed small hiatal hernia also noted. Organs: Lobulated contour and volume loss of the liver consistent with cirrhosis. Otherwise, no acute noncontrast abnormality of the abdominal organs. Hypodense left renal mass unchanged compared to prior studies most consistent with cyst measuring 3-3.5 cm. GI/Bowel: Circumferential mucosal thickening of stomach new/worse compared to 01/04/2023 partially reflecting incomplete distention however, diffuse gastritis, portal gastropathy may produce this appearance. Endoscopic evaluation would be helpful for further evaluation if clinically indicated. Otherwise, no acute abnormality of the GI tract. Pelvis: No acute noncontrast abnormality. Peritoneum/Retroperitoneum: Moderate volume ascites, lymphadenopathy throughout the mesentery similar to prior study with maximal short axis dimension of 12 mm unchanged compared to prior study. In the absence of known malignant disease, findings most consistent with reactive lymph node enlargement associated with ascites, hepatic cirrhosis. Mild atherosclerotic calcification throughout aortoiliac distribution. No aortic aneurysm. Small left inguinal hernia noted of questionable acute clinical significance. Bones/Soft Tissues: No acute abnormality. Degenerative disc disease lumbar spine with exiting nerve effacement/compression L5-S1 worse on the right.  Bilateral gynecomastia noted. 1.4 cm nodule medial aspect of the right breast/anterior chest wall adjacent to deep surface of the skin which may reflect inflammatory process unchanged compared to 05/05/2022. Diffuse hypodensity right abdominal wall musculature and mild infiltrative change of the subcutaneous fat of the right abdomen. Differential considerations include diffuse cellulitis, contusion, edema and changes associated with history of multiple paracenteses. Correlation with clinical findings suggested. Incomplete visualization moderate-large right pleural effusion, moderate-large volume ascites and findings consistent with underlying hepatic cirrhosis. Incomplete visualization 2 foci masslike pulmonary consolidation right lung measuring 4 cm in greatest dimension consistent with malignancy or round atelectasis. Incomplete visualization mediastinal lymphadenopathy similar to prior studies dating back to 05/05/2022 with maximal short axis dimension 1.3 cm. Diffuse mucosal thickening throughout the stomach which may reflect incomplete distention, portal gastropathy diffuse gastritis. Finding is new/worse compared to 01/04/2023. Endoscopic evaluation would be helpful for further evaluation if clinically indicated. Diffuse mesenteric lymphadenopathy similar to prior studies dating back to 05/05/2022 with maximal short axis dimension of 12 mm. Diffuse infiltrative changes subcutaneous soft tissues right abdominal wall, diffuse fatty infiltrative change right abdominal wall musculature. No subcutaneous gas density or drainable fluid collection. Findings are consistent with contusion, edema, cellulitis, history of multiple paracenteses. Correlation with clinical findings suggested. Gynecomastia and stable subcutaneous 1.4 cm nodule medial aspect of the right breast compared to prior studies dating back to 05/05/2022.      XR CHEST (2 VW)    Result Date: 1/24/2023  EXAMINATION: TWO XRAY VIEWS OF THE CHEST 1/23/2023 2:01 pm COMPARISON: 01/06/2023 HISTORY: ORDERING SYSTEM PROVIDED HISTORY: Recurrent pleural effusion on right FINDINGS: Heart size stable. Right pleural effusion and right midlung opacities persist.  Left lung remains clear. No pneumothorax. Persistent right pleural effusion with right mid lung opacities suggesting atelectasis     CT HEAD WO CONTRAST    Result Date: 2/10/2023  EXAMINATION: CT OF THE HEAD WITHOUT CONTRAST  2/10/2023 8:52 am TECHNIQUE: CT of the head was performed without the administration of intravenous contrast. Automated exposure control, iterative reconstruction, and/or weight based adjustment of the mA/kV was utilized to reduce the radiation dose to as low as reasonably achievable. COMPARISON: 01/28/2023 HISTORY: ORDERING SYSTEM PROVIDED HISTORY: HEAD TRAUMA, CLOSED, MILD, GCS >= 13, NO RISK FACTORS, NEURO EXAM NORMAL TECHNOLOGIST PROVIDED HISTORY: Has a \"code stroke\" or \"stroke alert\" been called? ->No Reason for exam:->ams Decision Support Exception - unselect if not a suspected or confirmed emergency medical condition->Emergency Medical Condition (MA) Reason for Exam:  Head trauma, closed, mild, GCS FINDINGS: BRAIN/VENTRICLES: There is no acute intracranial hemorrhage, mass effect or midline shift. No abnormal extra-axial fluid collection. The gray-white differentiation is maintained without evidence of an acute infarct. There is no evidence of hydrocephalus. Cortical atrophy, white matter changes consistent with microvascular degenerative disease, atherosclerotic calcification distal internal carotid and vertebral arteries of uncertain hemodynamic significance. ORBITS: The visualized portion of the orbits demonstrate no acute abnormality. SINUSES: The visualized paranasal sinuses and mastoid air cells demonstrate no acute abnormality. SOFT TISSUES/SKULL:  No acute abnormality of the visualized skull or soft tissues. Dental tamera right upper incisor suggested.      No noncontrast CT evidence of acute intracranial pathology or acute posttraumatic change Cortical atrophy, white matter changes consistent with microvascular degenerative disease, atherosclerotic calcification distal internal carotid and vertebral arteries of uncertain hemodynamic significance, dental tamera right upper incisor. CT Head W/O Contrast    Result Date: 1/28/2023  EXAMINATION: CT OF THE HEAD WITHOUT CONTRAST  1/28/2023 3:13 pm TECHNIQUE: CT of the head was performed without the administration of intravenous contrast. Automated exposure control, iterative reconstruction, and/or weight based adjustment of the mA/kV was utilized to reduce the radiation dose to as low as reasonably achievable. COMPARISON: CT 01/04/2023 HISTORY: ORDERING SYSTEM PROVIDED HISTORY: ams FINDINGS: BRAIN/VENTRICLES: There is no acute intracranial hemorrhage, mass effect or midline shift. No abnormal extra-axial fluid collection. The gray-white differentiation is maintained without evidence of an acute infarct. There is no evidence of hydrocephalus. Mild generalized parenchymal volume loss. ORBITS: The visualized portion of the orbits demonstrate no acute abnormality. SINUSES: The visualized paranasal sinuses and mastoid air cells demonstrate no acute abnormality. SOFT TISSUES/SKULL:  No acute abnormality of the visualized skull or soft tissues. No acute intracranial abnormality. CT CHEST W CONTRAST    Result Date: 1/28/2023  EXAMINATION: CT OF THE CHEST WITH CONTRAST 1/28/2023 7:51 pm TECHNIQUE: CT of the chest was performed with the administration of intravenous contrast. Multiplanar reformatted images are provided for review. Automated exposure control, iterative reconstruction, and/or weight based adjustment of the mA/kV was utilized to reduce the radiation dose to as low as reasonably achievable.  COMPARISON: CT chest, abdomen, and pelvis January 4, 2023; chest x-ray January 28, 2023; CT chest October 6, 2022 HISTORY: ORDERING SYSTEM PROVIDED HISTORY: evlauate pleural effusion/PNA, CXR mentions masslike lesion in RUL TECHNOLOGIST PROVIDED HISTORY: Reason for exam:->evlauate pleural effusion/PNA, CXR mentions masslike lesion in RUL Reason for Exam: evlauate pleural effusion/PNA, CXR mentions masslike lesion in RUL FINDINGS: Mediastinum: The heart is grossly stable in size. No pericardial effusion. Mediastinal lymphadenopathy redemonstrated. Lungs/pleura: There is a large right-sided pleural effusion with associated atelectasis. 2 separate foci of masslike consolidation centered within the right upper and middle lobes which are closely associated with the pleura (image 61 series 5; image 93 series 601; image 94 series 5; image 67 series 601). Findings may reflect areas of rounded atelectasis, however, recommend close clinical follow-up to ensure resolution given the masslike appearance. Nodular density also identified posteriorly along the right major fissure (image 124 series 5 which also may reflect a focus of rounded atelectasis. Again, recommend follow-up to resolution to exclude the possibility of underlying pulmonary nodule. The left lung is clear with no pleural effusion present. No pneumothorax identified. Upper Abdomen: Cirrhotic morphology of the liver with large volume intra-abdominal ascites present. Soft Tissues/Bones: No acute osseous or soft tissue abnormality identified. 1. 3 separate rounded masslike densities identified within the right upper and middle lobes and right lower lobe which are favored to reflect foci of rounded atelectasis. Recommend interval follow-up to ensure resolution and exclude the possibility of underlying pulmonary mass/pulmonary nodule. 2. Large right pleural effusion redemonstrated. 3. Redemonstration of cirrhotic morphology of the liver with large volume intra-abdominal ascites. 4. Nonspecific mediastinal lymphadenopathy.      XR CHEST PORTABLE    Result Date: 2/10/2023  EXAMINATION: ONE XRAY VIEW OF THE CHEST 2/10/2023 8:51 am COMPARISON: 01/30/2023, 01/23/2023 HISTORY: ORDERING SYSTEM PROVIDED HISTORY: AMS TECHNOLOGIST PROVIDED HISTORY: Reason for exam:->AMS Reason for Exam: AMS FINDINGS: Right PICC with tip at the right atrium. Areas of consolidation in the right upper lung remain. Small right basilar pleural effusion remains. The left lung is clear. Heart size is normal.     Right PICC with tip in the region of the right atrium. Right upper lung consolidation consolidation remains similar to 01/23/2023. Small right pleural effusion. XR CHEST PORTABLE    Result Date: 1/30/2023  EXAMINATION: ONE XRAY VIEW OF THE CHEST 1/30/2023 12:47 pm COMPARISON: 01/28/2023. Chest CT dated 01/28/2023. HISTORY: ORDERING SYSTEM PROVIDED HISTORY: thoracentesis TECHNOLOGIST PROVIDED HISTORY: Reason for exam:->thoracentesis Reason for Exam: thoracentesis FINDINGS: The cardiac silhouette appears within normal limits. There is tracheal deviation to the right indicating right hemithorax volume loss. There is suggestion of moderate residual pleural effusion. Opacity of the right lung may reflect underlying atelectasis is seen on as suggested on the prior CT. Improved aeration of the right lung apex. No definite pneumothorax appreciated. No definite pneumothorax appreciated status post right thoracentesis. Moderate residual right pleural effusion with suspected underlying atelectatic changes of right lung. XR CHEST PORTABLE    Result Date: 1/28/2023  EXAMINATION: ONE XRAY VIEW OF THE CHEST 1/28/2023 11:45 am COMPARISON: 01/23/2023 HISTORY: ORDERING SYSTEM PROVIDED HISTORY: Altered Mental Status TECHNOLOGIST PROVIDED HISTORY: Reason for exam:->Altered Mental Status Reason for Exam: AMS FINDINGS: Masslike opacity is again seen within the right upper lung. There is increasing density in the mid to right lower lung. Chronic interstitial opacities are seen on the left.   A focal infiltrate on the left is not found. The cardial pericardial silhouette is unremarkable. No acute bony abnormality identified. Increasing opacity throughout the right hemithorax, which may reflect increasing layering effusion and/or additional airspace disease. Masslike opacity in the right upper lung again demonstrated. IR US GUIDED PARACENTESIS    Result Date: 2/7/2023  PROCEDURE: PARACENTESIS WITHOUT IMAGE GUIDANCE US ABDOMEN LIMITED 2/7/2023 HISTORY: ORDERING SYSTEM PROVIDED HISTORY: Other ascites TECHNIQUE: Informed consent was obtained after a detailed explanation of the procedure including risks, benefits, and alternatives. Universal protocol was followed. A limited ultrasound of the abdomen was performed. The right abdomen was prepped and draped in sterile fashion and local anesthesia was achieved with lidocaine. An 8 Pashto needle sheath was advanced into ascites and paracentesis was performed. The patient tolerated the procedure well. FINDINGS: Limited ultrasound of the abdomen demonstrates ascites. A total of 6 L was removed. Successful paracentesis. IR US GUIDED PARACENTESIS    Result Date: 1/30/2023  PROCEDURE: PARACENTESIS WITHOUT IMAGE GUIDANCE US ABDOMEN LIMITED 1/30/2023 HISTORY: ORDERING SYSTEM PROVIDED HISTORY: paracentesis TECHNOLOGIST PROVIDED HISTORY: Paracentesis See IP consult Reason for exam:->paracentesis TECHNIQUE: Maximum sterile barrier technique including hand hygiene, skin prep and sterile ultrasound technique utilized for procedure. Sterile ultrasound technique also utilized for procedure Ultrasound guidance required for procedure to confirm presence of ascites, puncture site selection, real-time intra procedural guidance. Images made for patient's medical file. Informed consent was obtained after a detailed explanation of the procedure including risks, benefits, and alternatives. Universal protocol was followed.   A limited ultrasound of the abdomen was performed. The right abdomen was prepped and draped in sterile fashion and local anesthesia was achieved with lidocaine. An 6 Burkinan needle sheath was advanced into ascites and paracentesis was performed. The patient tolerated the procedure well. FINDINGS: Limited ultrasound of the abdomen demonstrates ascites with paracentesis catheter within it. A total of 5004 mL milky/chylous appearing ascitic fluid was removed. 37.5 g intravenous albumin utilized for procedure. No complication suggested. Successful ultrasound-guided paracentesis. IR US GUIDED PARACENTESIS W IMAGING    Result Date: 1/24/2023  PROCEDURE: PARACENTESIS WITHOUT IMAGE GUIDANCE US ABDOMEN LIMITED 1/24/2023 HISTORY: ORDERING SYSTEM PROVIDED HISTORY: Cirrhosis of liver with ascites, unspecified hepatic cirrhosis type [facility]) TECHNOLOGIST PROVIDED HISTORY: Remove as much fluid as possible; please send fluid for:  cell count and diff, culture and sensitivity. Please give 6-8 grams of Albumin IV for each liter of fluid removed. Please inoculate fluid immediately into aerobic and anaerobic blood culture bottles for the culture specimen. Reason for exam:->persistent ascites secondary to decompensated cirrhosis TECHNIQUE: Maximum sterile barrier technique including hand hygiene, skin prep and sterile ultrasound technique utilized for procedure. Sterile ultrasound technique also utilized for procedure Ultrasound guidance required for procedure to confirm presence of ascites, puncture site selection, real-time intra procedural guidance. Images made for patient's medical file. Informed consent was obtained after a detailed explanation of the procedure including risks, benefits, and alternatives. Universal protocol was followed. A limited ultrasound of the abdomen was performed. The right abdomen was prepped and draped in sterile fashion and local anesthesia was achieved with lidocaine.   An 6 Burkinan needle sheath was advanced into ascites and paracentesis was performed. The patient tolerated the procedure well. FINDINGS: Limited ultrasound of the abdomen demonstrates ascites with paracentesis catheter. A total of 03611 cc cloudy yellow fluid was removed. 1000 cc specimen sent for laboratory evaluation. 75 g intravenous albumin utilized for procedure. Successful ultrasound-guided paracentesis with specimen sent for laboratory evaluation.            EKG this visit:  personally reviewed         Electronically signed by MOHINDER Silvestre CNP on 2/10/2023 at 11:12 AM

## 2023-02-11 LAB
ALBUMIN SERPL-MCNC: 2.7 GM/DL (ref 3.4–5)
ALP BLD-CCNC: 90 IU/L (ref 40–128)
ALT SERPL-CCNC: 24 U/L (ref 10–40)
AMMONIA: 74 UMOL/L (ref 16–60)
ANION GAP SERPL CALCULATED.3IONS-SCNC: 10 MMOL/L (ref 4–16)
AST SERPL-CCNC: 35 IU/L (ref 15–37)
BASOPHILS ABSOLUTE: 0.1 K/CU MM
BASOPHILS RELATIVE PERCENT: 1.3 % (ref 0–1)
BILIRUB SERPL-MCNC: 0.7 MG/DL (ref 0–1)
BUN SERPL-MCNC: 37 MG/DL (ref 6–23)
CALCIUM SERPL-MCNC: 9.3 MG/DL (ref 8.3–10.6)
CHLORIDE BLD-SCNC: 104 MMOL/L (ref 99–110)
CO2: 25 MMOL/L (ref 21–32)
CREAT SERPL-MCNC: 2 MG/DL (ref 0.9–1.3)
CRP SERPL HS-MCNC: 19.4 MG/L
DIFFERENTIAL TYPE: ABNORMAL
DOSE AMOUNT: NORMAL
DOSE TIME: NORMAL
EOSINOPHILS ABSOLUTE: 0.5 K/CU MM
EOSINOPHILS RELATIVE PERCENT: 5.9 % (ref 0–3)
GFR SERPL CREATININE-BSD FRML MDRD: 34 ML/MIN/1.73M2
GLUCOSE SERPL-MCNC: 93 MG/DL (ref 70–99)
HCT VFR BLD CALC: 34.2 % (ref 42–52)
HEMOGLOBIN: 11.3 GM/DL (ref 13.5–18)
IMMATURE NEUTROPHIL %: 0.3 % (ref 0–0.43)
INR BLD: 1.04 INDEX
LYMPHOCYTES ABSOLUTE: 0.9 K/CU MM
LYMPHOCYTES RELATIVE PERCENT: 11.2 % (ref 24–44)
MCH RBC QN AUTO: 31.2 PG (ref 27–31)
MCHC RBC AUTO-ENTMCNC: 33 % (ref 32–36)
MCV RBC AUTO: 94.5 FL (ref 78–100)
MONOCYTES ABSOLUTE: 1 K/CU MM
MONOCYTES RELATIVE PERCENT: 13 % (ref 0–4)
NUCLEATED RBC %: 0 %
PDW BLD-RTO: 14.4 % (ref 11.7–14.9)
PLATELET # BLD: 248 K/CU MM (ref 140–440)
PMV BLD AUTO: 9.6 FL (ref 7.5–11.1)
POTASSIUM SERPL-SCNC: 4.4 MMOL/L (ref 3.5–5.1)
PROTHROMBIN TIME: 13.4 SECONDS (ref 11.7–14.5)
RBC # BLD: 3.62 M/CU MM (ref 4.6–6.2)
SEGMENTED NEUTROPHILS ABSOLUTE COUNT: 5.4 K/CU MM
SEGMENTED NEUTROPHILS RELATIVE PERCENT: 68.3 % (ref 36–66)
SODIUM BLD-SCNC: 139 MMOL/L (ref 135–145)
TOTAL IMMATURE NEUTOROPHIL: 0.02 K/CU MM
TOTAL NUCLEATED RBC: 0 K/CU MM
TOTAL PROTEIN: 6.3 GM/DL (ref 6.4–8.2)
TROPONIN T: 0.06 NG/ML
TROPONIN T: 0.06 NG/ML
VANCOMYCIN RANDOM: 27.2 UG/ML
WBC # BLD: 7.9 K/CU MM (ref 4–10.5)

## 2023-02-11 PROCEDURE — 84484 ASSAY OF TROPONIN QUANT: CPT

## 2023-02-11 PROCEDURE — 2580000003 HC RX 258: Performed by: INTERNAL MEDICINE

## 2023-02-11 PROCEDURE — 76937 US GUIDE VASCULAR ACCESS: CPT

## 2023-02-11 PROCEDURE — 1200000000 HC SEMI PRIVATE

## 2023-02-11 PROCEDURE — 6370000000 HC RX 637 (ALT 250 FOR IP): Performed by: NURSE PRACTITIONER

## 2023-02-11 PROCEDURE — 99222 1ST HOSP IP/OBS MODERATE 55: CPT | Performed by: SPECIALIST

## 2023-02-11 PROCEDURE — 94761 N-INVAS EAR/PLS OXIMETRY MLT: CPT

## 2023-02-11 PROCEDURE — 82140 ASSAY OF AMMONIA: CPT

## 2023-02-11 PROCEDURE — C9113 INJ PANTOPRAZOLE SODIUM, VIA: HCPCS | Performed by: NURSE PRACTITIONER

## 2023-02-11 PROCEDURE — 80053 COMPREHEN METABOLIC PANEL: CPT

## 2023-02-11 PROCEDURE — 6360000002 HC RX W HCPCS: Performed by: NURSE PRACTITIONER

## 2023-02-11 PROCEDURE — 86140 C-REACTIVE PROTEIN: CPT

## 2023-02-11 PROCEDURE — 85610 PROTHROMBIN TIME: CPT

## 2023-02-11 PROCEDURE — 80202 ASSAY OF VANCOMYCIN: CPT

## 2023-02-11 PROCEDURE — 99222 1ST HOSP IP/OBS MODERATE 55: CPT | Performed by: INTERNAL MEDICINE

## 2023-02-11 PROCEDURE — 99223 1ST HOSP IP/OBS HIGH 75: CPT | Performed by: INTERNAL MEDICINE

## 2023-02-11 PROCEDURE — 2580000003 HC RX 258: Performed by: NURSE PRACTITIONER

## 2023-02-11 PROCEDURE — 85025 COMPLETE CBC W/AUTO DIFF WBC: CPT

## 2023-02-11 RX ORDER — LIDOCAINE HYDROCHLORIDE 10 MG/ML
5 INJECTION, SOLUTION EPIDURAL; INFILTRATION; INTRACAUDAL; PERINEURAL ONCE
Status: DISCONTINUED | OUTPATIENT
Start: 2023-02-11 | End: 2023-02-15 | Stop reason: HOSPADM

## 2023-02-11 RX ORDER — SODIUM CHLORIDE 0.9 % (FLUSH) 0.9 %
5-40 SYRINGE (ML) INJECTION EVERY 12 HOURS SCHEDULED
Status: DISCONTINUED | OUTPATIENT
Start: 2023-02-11 | End: 2023-02-15 | Stop reason: HOSPADM

## 2023-02-11 RX ORDER — SODIUM CHLORIDE 0.9 % (FLUSH) 0.9 %
5-40 SYRINGE (ML) INJECTION PRN
Status: DISCONTINUED | OUTPATIENT
Start: 2023-02-11 | End: 2023-02-15 | Stop reason: HOSPADM

## 2023-02-11 RX ORDER — SODIUM CHLORIDE 9 MG/ML
INJECTION, SOLUTION INTRAVENOUS CONTINUOUS
Status: DISPENSED | OUTPATIENT
Start: 2023-02-11 | End: 2023-02-11

## 2023-02-11 RX ORDER — SODIUM CHLORIDE 9 MG/ML
INJECTION, SOLUTION INTRAVENOUS PRN
Status: DISCONTINUED | OUTPATIENT
Start: 2023-02-11 | End: 2023-02-15 | Stop reason: HOSPADM

## 2023-02-11 RX ADMIN — SODIUM CHLORIDE, PRESERVATIVE FREE 10 ML: 5 INJECTION INTRAVENOUS at 20:38

## 2023-02-11 RX ADMIN — PANTOPRAZOLE SODIUM 40 MG: 40 INJECTION, POWDER, LYOPHILIZED, FOR SOLUTION INTRAVENOUS at 11:11

## 2023-02-11 RX ADMIN — LACTULOSE 20 G: 10 SOLUTION ORAL at 09:05

## 2023-02-11 RX ADMIN — LACTULOSE 20 G: 10 SOLUTION ORAL at 20:38

## 2023-02-11 RX ADMIN — SODIUM CHLORIDE, PRESERVATIVE FREE 10 ML: 5 INJECTION INTRAVENOUS at 09:12

## 2023-02-11 RX ADMIN — RIFAXIMIN 550 MG: 550 TABLET ORAL at 09:05

## 2023-02-11 RX ADMIN — ENOXAPARIN SODIUM 40 MG: 100 INJECTION SUBCUTANEOUS at 09:05

## 2023-02-11 RX ADMIN — NADOLOL 40 MG: 20 TABLET ORAL at 20:38

## 2023-02-11 RX ADMIN — LACTULOSE 20 G: 10 SOLUTION ORAL at 15:20

## 2023-02-11 RX ADMIN — RIFAXIMIN 550 MG: 550 TABLET ORAL at 20:38

## 2023-02-11 RX ADMIN — SODIUM CHLORIDE: 9 INJECTION, SOLUTION INTRAVENOUS at 09:05

## 2023-02-11 RX ADMIN — LEVOTHYROXINE SODIUM 50 MCG: 0.05 TABLET ORAL at 06:16

## 2023-02-11 NOTE — PROGRESS NOTES
[address] [facility]  consulted by MOHINDER Quintanilla for monitoring and adjustment. Indication for treatment: Vancomycin indication: Other: Endocarditis  Goal trough: Trough Goal: 15-20 mcg/mL  AUC/TRES: 400-600    Risk Factors for MRSA Identified:   Hospitalization within the past 90 days    Pertinent Laboratory Values:   Temp Readings from Last 3 Encounters:   02/11/23 98.1 °F (36.7 °C) (Tympanic)   02/07/23 98.4 °F (36.9 °C) (Temporal)   02/06/23 97.9 °F (36.6 °C) (Infrared)     Recent Labs     02/10/23  0853 02/11/23  0550   WBC 8.2 7.9   LACTATE 1.1  --        Recent Labs     02/10/23  0853 02/11/23  0550   BUN 40* 37*   CREATININE 2.1* 2.0*       Estimated Creatinine Clearance: 36 mL/min (A) (based on SCr of 2 mg/dL (H)). Intake/Output Summary (Last 24 hours) at 2/11/2023 1133  Last data filed at 2/11/2023 0156  Gross per 24 hour   Intake --   Output 400 ml   Net -400 ml       Pertinent Cultures:   Date    Source    Results  02/03/23   Blood    Staph Epidermidis      Vancomycin level:   TROUGH:    Recent Labs     02/08/23  1245   VANCOTROUGH 27.0*       RANDOM:    Recent Labs     02/10/23  1801 02/11/23  0550   VANCORANDOM 17.1 27.2         Assessment:  HPI: Patient is 78 yom on vancomycin 1000mg IV q24h for staphylococcus epidermidis bacteremia from bacterial peritonitis with native TV endocarditis completing a 6 week course of vancomycin IV due to end on 3/13/2023. PMH Cirrhosis, Hepatic Hydrothorax, Hep. Encephalopathy. SCr, BUN, and urine output: CANDIE, continued elevated Scr and BUN (baseline 1.2?)  Day(s) of therapy: Planned vanco through 3/13  Vancomycin concentration:   2/10 @ 1801 = 17.1 (8 hours post dose)    Plan:  Patient received vancomycin 1000mg IV x 1 at 1800. Unclear when last home dose of vanco was administered. Will plan for vanco level in AM to better assess clearance and need for repeat vanco dose.   Vancomycin regimen to continue until March 13, 2023  Pharmacy will continue to monitor patient and adjust therapy as indicated    Rogelio 3 2/12 @ 0600    Thank you for the consult.   Emilie Bruner, [facility]  2/11/2023 11:33 AM

## 2023-02-11 NOTE — CONSULTS
Nephrology Service Consultation      [address], [address] [address]  Phone: [phone]  Office Hours: 8:30AM - 4:30PM  Monday - Friday        MEDICAL DECISION MAKING and Recommendations   CANDIE  CKD3  Decompensated cirrhosis with ascites and pleural effusions  Hepatic encephaloptahy    Suggest:  -Gentle IVF ordered  -The last thing to add to his ailments would be dialysis, which will affect his quality of life.   If able to avoid vancomycin then please do so  -Avoid nephrotoxins  -Hold diuretics for now please    Thank you      Patient Active Problem List    Diagnosis Date Noted    Chronic heart failure, unspecified heart failure type ([address] 97/00/1311    Alcoholic cirrhosis of liver with ascites ([address] 01/31/2023    Coagulase negative Staphylococcus bacteremia 01/31/2023    Stage 3 chronic kidney disease ([address] 01/09/2023    Bacteremia due to methicillin resistant Staphylococcus epidermidis 01/09/2023    Sleep related hypoxia 12/06/2022    AMS (altered mental status) 11/12/2022    Hypoxia 11/07/2022    Pleural effusion 10/04/2022    Recurrent pleural effusion on right 09/09/2022    Ex-smoker 08/15/2022    Obesity (BMI 30-39.9) 08/15/2022    Type 2 diabetes mellitus with chronic kidney disease 07/12/2022    Thyroid disease 06/21/2022    Cellulitis of left lower extremity 06/08/2022    Type 2 diabetes mellitus without complication, without long-term current use of insulin ([address] 06/08/2022    Generalized weakness     Oropharyngeal dysphagia     Acute kidney injury superimposed on chronic kidney disease ([address]     Acute respiratory failure ([address] 05/04/2022    Recurrent right pleural effusion 05/02/2022    Septicemia ([address] 56/89/2066    Acute metabolic encephalopathy 14/04/6799    Hearing loss 03/11/2022    Decompensation of cirrhosis of liver ([address]     Secondary esophageal varices without bleeding ([address]     Gastric polyps     SOB (shortness of breath) 09/13/2021    Portal hypertension ([address] 08/30/2021    Bilateral hearing loss 05/07/2021    Increased ammonia level 05/07/2021    Class 3 severe obesity with body mass index (BMI) of 40.0 to 44.9 in adult [facility]) 09/21/2020    Hyperglycemia 12/04/2019    Acquired hypothyroidism 12/04/2019    Pulmonary nodules 09/09/2019    Ascites due to alcoholic cirrhosis ([facility] 75/82/1312    Mixed hyperlipidemia 08/08/2019    Hypertension 08/08/2019    History of alcohol abuse 08/08/2019    Gastroesophageal reflux disease 08/08/2019    Anxiety 08/08/2019    Shortness of breath 05/26/2019    History of colonic polyps 01/15/2019         Patient:  Jacquelyn Dumont  MRN: [id number]  Consulting physician:  John Rogers MD  Reason for Consult: cr 2  PCP: Maco Shankar MD    HISTORY OF PRESENT ILLNESS:   The patient is a 78 y.o. male with decompensated cirrhosis, hearing impairment, presented for confusion  He is now back to baseline per his wife, at bedside  Renal consult for cr 2  He was discharged on vanc few days ago for endocarditis  He is on room air  Eating grapes currently    REVIEW OF SYSTEMS:  14 point ROS is Negative. See positive ROS per HPI    Past Medical History:        Diagnosis Date    Anxiety     Cirrhosis, alcoholic ([facility]     Diabetes mellitus ([facility]     GERD (gastroesophageal reflux disease) 2000    GERD (gastroesophageal reflux disease)     Hyperlipidemia     Hypertension     Portal hypertension ([facility])     Pulmonary nodules     Sleep apnea     SOB (shortness of breath)     Thyroid disease        Past Surgical History:        Procedure Laterality Date    APPENDECTOMY      CHOLECYSTECTOMY      COLONOSCOPY      ENDOSCOPY, COLON, DIAGNOSTIC      FOOT SURGERY      needle removed,not sure which foot, per wife. TONSILLECTOMY      UPPER GASTROINTESTINAL ENDOSCOPY N/A 1/19/2022    EGD BIOPSY performed by Lazarus Divers, MD at [address] [place] [address] Rd         Medications:   Prior to Admission medications    Medication Sig Start Date End Date Taking? Authorizing Provider   spironolactone (ALDACTONE) 100 MG tablet Take 1 tablet by mouth 2 times daily 2/8/23   Radha Garcia MD   rifAXIMin [place]) 550 MG tablet Take 1 tablet by mouth 2 times daily 1/11/23   Bernadette Mahan MD   thiamine 100 MG tablet Take 1 tablet by mouth daily  Patient taking differently: Take 100 mg by mouth daily OTC 1/12/23   Bernadette Mahan MD   furosemide (LASIX) 40 MG tablet Take 1 tablet by mouth daily 11/22/22   Radha Garcia MD   levothyroxine (SYNTHROID) 50 MCG tablet Take 1 tablet by mouth Daily 10/14/22 4/12/23  Radha Garcia MD   albuterol sulfate HFA (PROVENTIL;VENTOLIN;PROAIR) 108 (90 Base) MCG/ACT inhaler INHALE 2 PUFFS BY MOUTH 4 TIMES DAILY AS NEEDED FOR WHEEZING 10/14/22   Radha Garcia MD   DULoxetine (CYMBALTA) 60 MG extended release capsule TAKE 1 CAPSULE BY MOUTH ONCE DAILY  Patient taking differently: Take 60 mg by mouth daily TAKE 1 CAPSULE BY MOUTH ONCE DAILY 10/14/22 8/18/23  Radha Garcia MD   betamethasone valerate (VALISONE) 0.1 % lotion Apply topically 2 times daily prn scalp rash 10/14/22   Radha Garcia MD   nadolol (CORGARD) 40 MG tablet Take 1 tablet by mouth in the morning. Patient taking differently: No sig reported 7/20/22 2/10/23  Fan Phan, APRN - CNP   lactulose [facility]) 10 GM/15ML solution Take 30 mLs by mouth 3 times daily Hold if having diarrhea 3/21/22   Zulema Peterson MD   Multiple Vitamin (MULTI VITAMIN MENS PO) Take 1 tablet by mouth daily    Historical Provider, MD   ESOMEPRAZOLE MAGNESIUM PO Take 20 mg by mouth daily OTC    Historical Provider, MD   [place]-3 1000 MG CAPS Take 2,000 mg by mouth daily    Historical Provider, MD        Allergies:  Lisinopril, Zetia [ezetimibe], Atorvastatin, Codeine, Hydrochlorothiazide, Hydroxyzine, Lexapro [escitalopram oxalate], and Pravastatin    Social History:   TOBACCO:   reports that he quit smoking about 47 years ago.  His smoking use included cigarettes. He has a 14.00 pack-year smoking history. He has never used smokeless tobacco.  ETOH:   reports that he does not currently use alcohol.   OCCUPATION:      Family History:       Problem Relation Age of Onset    Hypertension Brother     Diabetes Other      Physical Exam:    Vitals: /73   Pulse 68   Temp 98.1 °F (36.7 °C) (Tympanic)   Resp 14   Wt 209 lb (94.8 kg)   SpO2 99%   BMI 31.78 kg/m²   General appearance: in no acute distress, appears stated age  Skin: Skin color, texture, turgor normal. No rashes or lesions  HEENT: normocephalic, atraumatic  Neck: supple, trachea midline  Lungs: clear to auscultation bilaterally, breathing comfortably  Heart[de-identified] regular rate and rhythm, S1, S2 normal,  Abdomen: soft, non-tender; bowel sounds normal; no masses,   Extremities: extremities normal, atraumatic, no cyanosis or edema  Neurologic: Mental status: alert, oriented, interactive, following commands  Psychiatric: mood and affect appropriate     CBC:   Recent Labs     02/10/23  0853 02/11/23  0550   WBC 8.2 7.9   HGB 10.8* 11.3*    248     BMP:    Recent Labs     02/10/23  0853 02/11/23  0550    139   K 4.5 4.4    104   CO2 25 25   BUN 40* 37*   CREATININE 2.1* 2.0*   GLUCOSE 108* 93     Hepatic:   Recent Labs     02/10/23  0853 02/11/23  0550   AST 31 35   ALT 22 24   BILITOT 0.5 0.7   ALKPHOS 90 90              Electronically signed by Thi Varela DO on 2/11/2023 at 8:26 AM    [address] Esau Amaro DO [place] ,  [facility] [address]  PHONE: [phone]  FAX: [phone]

## 2023-02-11 NOTE — PROGRESS NOTES
V2.0  [facility] Hospitalist Progress Note      Name:  Rufus Smith /Age/Sex: 1945  (78 y.o. male)   MRN & CSN:  [id number] & [id number] Encounter Date/Time: 2023 3:06 PM EST    Location:  [id number]-[id number] PCP: Guillermo Lucero Day: 2    Assessment and Plan:   Rufus Smith is a 78 y.o. male with pmh of  who presents with AMS (altered mental status)    # Acute metabolic encephalopathy likely due to hepatic encephalopathy    CT of the head nonacute.   -Ammonia on presentation 125. Patient started on lactulose and rifaximin for    #Decompensated alcohol liver cirrhosis  -Underwent paracentesis on 2/10/2023. Fluid analysis not suggestive for SBP.  -Evaluated by GI.  -Continue lactulose and rifaximin      #Tricuspid valve endocarditis  -As noted on EVA done on 2023.  -Patient had staph epi bacteremia. Positive blood cultures on 2023. Was getting IV antibiotics outpatient. Plan to continue IV vancomycin 1 g every 24 hour till 3/13/2023. #Staph epi bacteremia  -Continue IV vancomycin  -Was seen by ID last admission. Has been consulted       # Acute kidney injury on CKD stage III   -Dehydration versus hepatorenal versus vancomycin use  -Creatinine on presentation 2.1 mg/DL. Baseline creatinine appears to be around 1.2 1.3 mg/DL. -Nephrology on board. Holding diuretics. Currently on gentle IVF. # Elevated trop  -Likely demand ischemia and CKD  -Evaluated by cardiology. Other chronic medical conditions-resume home medications as contraindicated  # JENNIFER/Chronic respiratory failure on O2 2l per nc-on recent admission patient was managed on BiPAP nightly and as needed, per pts wife pt is not on home BIPAP, continue O2 @ 2l per nc, monitor pulse ox  # Essential hypertension -currently on naldalol and diuretics, BP stable  # Acquired hypothyroidism-resume levothyroxine, f/u tsh  # History right hydrothorax status post right thoracentesis on recent hospitalization. Monitor need for repeat thoracentesis. Consider pulm consultation  # GERD on ppi  #Burning urination. UA unremarkable. Follow urine culture    Diet Diet NPO Exceptions are: Sips of Water with Meds   DVT Prophylaxis [] Lovenox, []  Heparin, [] SCDs, [] Ambulation,  [] Eliquis, [] Xarelto  [] Coumadin   Code Status Full Code   Disposition From:   Expected Disposition:   Estimated Date of Discharge:   Patient requires continued admission due to CANDIE   Surrogate Decision Maker/ POA      Subjective:     Chief Complaint: Altered Mental Status       Cammy Terry is a 78 y.o. male who presents with encephalopathy. He is hard of hearing. Complaining of burning urination. He is alert and oriented. Able to provide history. His wife is also present at bedside. He is doing well compared to yesterday. Review of Systems:    Review of Systems    As above     Objective: Intake/Output Summary (Last 24 hours) at 2/11/2023 1506  Last data filed at 2/11/2023 0156  Gross per 24 hour   Intake --   Output 400 ml   Net -400 ml        Vitals:   Vitals:    02/11/23 1421   BP: 134/83   Pulse: 74   Resp: 13   Temp: 97.7 °F (36.5 °C)   SpO2: 100%       Physical Exam:     General: NAD  Eyes: EOMI  ENT: neck supple  Cardiovascular: Regular rate. Respiratory: Clear to auscultation  Gastrointestinal: Soft, non tender  Genitourinary: no suprapubic tenderness  Musculoskeletal: No edema  Skin: warm, dry  Neuro: Alert. Psych: Mood appropriate.      Medications:   Medications:    sodium chloride flush  5-40 mL IntraVENous 2 times per day    enoxaparin  40 mg SubCUTAneous Daily    lactulose  20 g Oral TID    levothyroxine  50 mcg Oral Daily    nadolol  40 mg Oral Nightly    [Held by provider] furosemide  40 mg Oral Daily    [Held by provider] spironolactone  100 mg Oral BID    rifAXIMin  550 mg Oral BID    pantoprazole  40 mg IntraVENous Daily    vancomycin (VANCOCIN) intermittent dosing (placeholder)   Other RX Placeholder Infusions:    sodium chloride 75 mL/hr at 02/11/23 0905    sodium chloride       PRN Meds: sodium chloride flush, 5-40 mL, PRN  sodium chloride, , PRN  ondansetron, 4 mg, Q8H PRN   Or  ondansetron, 4 mg, Q6H PRN  polyethylene glycol, 17 g, Daily PRN  acetaminophen, 650 mg, Q6H PRN   Or  acetaminophen, 650 mg, Q6H PRN        Labs      Recent Results (from the past 24 hour(s))   TSH    Collection Time: 02/10/23  6:01 PM   Result Value Ref Range    TSH, High Sensitivity 2.410 0.270 - 4.20 uIu/ml   C-Reactive Protein    Collection Time: 02/10/23  6:01 PM   Result Value Ref Range    CRP High Sensitivity 19.1 (H) <2.1 mg/L   Salicylate    Collection Time: 02/10/23  6:01 PM   Result Value Ref Range    Salicylate Lvl <8.7 (L) 15 - 30 MG/DL    DOSE AMOUNT DOSE AMT. GIVEN - unknown     DOSE TIME DOSE TIME GIVEN - unknown    Acetaminophen Level    Collection Time: 02/10/23  6:01 PM   Result Value Ref Range    Acetaminophen Level <5.0 (L) 15 - 30 ug/ml    DOSE AMOUNT DOSE AMT. GIVEN - unknown     DOSE TIME DOSE TIME GIVEN - unknown    Troponin    Collection Time: 02/10/23  6:01 PM   Result Value Ref Range    Troponin T 0.021 (H) <0.01 NG/ML   Procalcitonin    Collection Time: 02/10/23  6:01 PM   Result Value Ref Range    Procalcitonin 0.125    Vancomycin Level, Random    Collection Time: 02/10/23  6:01 PM   Result Value Ref Range    Vancomycin Rm 17.1 UG/ML    DOSE AMOUNT DOSE AMT.  GIVEN - UNKNOWN     DOSE TIME DOSE TIME GIVEN - UNKNOWN    Sedimentation Rate    Collection Time: 02/10/23 10:00 PM   Result Value Ref Range    Sed Rate 43 (H) 0 - 20 MM/HR   Ammonia    Collection Time: 02/11/23  5:50 AM   Result Value Ref Range    Ammonia 74 (H) 16 - 60 UMOL/L   C-Reactive Protein    Collection Time: 02/11/23  5:50 AM   Result Value Ref Range    CRP High Sensitivity 19.4 (H) <5.0 mg/L   CBC with Auto Differential    Collection Time: 02/11/23  5:50 AM   Result Value Ref Range    WBC 7.9 4.0 - 10.5 K/CU MM    RBC 3.62 (L) 4.6 - 6.2 M/CU MM    Hemoglobin 11.3 (L) 13.5 - 18.0 GM/DL    Hematocrit 34.2 (L) 42 - 52 %    MCV 94.5 78 - 100 FL    MCH 31.2 (H) 27 - 31 PG    MCHC 33.0 32.0 - 36.0 %    RDW 14.4 11.7 - 14.9 %    Platelets 236 208 - 978 K/CU MM    MPV 9.6 7.5 - 11.1 FL    Differential Type AUTOMATED DIFFERENTIAL     Segs Relative 68.3 (H) 36 - 66 %    Lymphocytes % 11.2 (L) 24 - 44 %    Monocytes % 13.0 (H) 0 - 4 %    Eosinophils % 5.9 (H) 0 - 3 %    Basophils % 1.3 (H) 0 - 1 %    Segs Absolute 5.4 K/CU MM    Lymphocytes Absolute 0.9 K/CU MM    Monocytes Absolute 1.0 K/CU MM    Eosinophils Absolute 0.5 K/CU MM    Basophils Absolute 0.1 K/CU MM    Nucleated RBC % 0.0 %    Total Nucleated RBC 0.0 K/CU MM    Total Immature Neutrophil 0.02 K/CU MM    Immature Neutrophil % 0.3 0 - 0.43 %   Comprehensive Metabolic Panel w/ Reflex to MG    Collection Time: 02/11/23  5:50 AM   Result Value Ref Range    Sodium 139 135 - 145 MMOL/L    Potassium 4.4 3.5 - 5.1 MMOL/L    Chloride 104 99 - 110 mMol/L    CO2 25 21 - 32 MMOL/L    BUN 37 (H) 6 - 23 MG/DL    Creatinine 2.0 (H) 0.9 - 1.3 MG/DL    Est, Glom Filt Rate 34 (L) >60 mL/min/1.73m2    Glucose 93 70 - 99 MG/DL    Calcium 9.3 8.3 - 10.6 MG/DL    Albumin 2.7 (L) 3.4 - 5.0 GM/DL    Total Protein 6.3 (L) 6.4 - 8.2 GM/DL    Total Bilirubin 0.7 0.0 - 1.0 MG/DL    ALT 24 10 - 40 U/L    AST 35 15 - 37 IU/L    Alkaline Phosphatase 90 40 - 128 IU/L    Anion Gap 10 4 - 16   Protime-INR    Collection Time: 02/11/23  5:50 AM   Result Value Ref Range    Protime 13.4 11.7 - 14.5 SECONDS    INR 1.04 INDEX   Troponin    Collection Time: 02/11/23  5:50 AM   Result Value Ref Range    Troponin T 0.057 (H) <0.01 NG/ML   Vancomycin Level, Random    Collection Time: 02/11/23  5:50 AM   Result Value Ref Range    Vancomycin Rm 27.2 UG/ML    DOSE AMOUNT DOSE AMT.  GIVEN - 1 gm     DOSE TIME DOSE TIME GIVEN - 10th at 2100    Troponin    Collection Time: 02/11/23 11:20 AM   Result Value Ref Range    Troponin T 0.064 (H) <0.01 NG/ML        Imaging/Diagnostics Last 24 Hours   CT ABDOMEN PELVIS WO CONTRAST Additional Contrast? None    Result Date: 2/10/2023  EXAMINATION: CT OF THE ABDOMEN AND PELVIS WITHOUT CONTRAST 2/10/2023 8:52 am TECHNIQUE: CT of the abdomen and pelvis was performed without the administration of intravenous contrast. Multiplanar reformatted images are provided for review. Automated exposure control, iterative reconstruction, and/or weight based adjustment of the mA/kV was utilized to reduce the radiation dose to as low as reasonably achievable. COMPARISON: Chest abdomen pelvis CT 01/04/2023, abdomen pelvis CT 05/05/2022 HISTORY: ORDERING SYSTEM PROVIDED HISTORY: ams/recent paracentesis TECHNOLOGIST PROVIDED HISTORY: Reason for exam:->ams/recent paracentesis Additional Contrast?->None Decision Support Exception - unselect if not a suspected or confirmed emergency medical condition->Emergency Medical Condition (MA) Reason for Exam: ams/recent paracentesis FINDINGS: Lower Chest: Incomplete visualization moderate-large right pleural effusion, masslike areas of pulmonary consolidation anterior right mid lung field and posterior right basilar region, diffuse bronchovascular marking prominence throughout visualized lung fields bilaterally. Correlation with history of right lung malignancy, congestive heart failure, bilateral bronchitis/pneumonitis. Decompressed small hiatal hernia also noted. Organs: Lobulated contour and volume loss of the liver consistent with cirrhosis. Otherwise, no acute noncontrast abnormality of the abdominal organs. Hypodense left renal mass unchanged compared to prior studies most consistent with cyst measuring 3-3.5 cm. GI/Bowel: Circumferential mucosal thickening of stomach new/worse compared to 01/04/2023 partially reflecting incomplete distention however, diffuse gastritis, portal gastropathy may produce this appearance.   Endoscopic evaluation would be helpful for further evaluation if clinically indicated. Otherwise, no acute abnormality of the GI tract. Pelvis: No acute noncontrast abnormality. Peritoneum/Retroperitoneum: Moderate volume ascites, lymphadenopathy throughout the mesentery similar to prior study with maximal short axis dimension of 12 mm unchanged compared to prior study.  In the absence of known malignant disease, findings most consistent with reactive lymph node enlargement associated with ascites, hepatic cirrhosis.  Mild atherosclerotic calcification throughout aortoiliac distribution.  No aortic aneurysm.  Small left inguinal hernia noted of questionable acute clinical significance. Bones/Soft Tissues: No acute abnormality.  Degenerative disc disease lumbar spine with exiting nerve effacement/compression L5-S1 worse on the right. Bilateral gynecomastia noted.  1.4 cm nodule medial aspect of the right breast/anterior chest wall adjacent to deep surface of the skin which may reflect inflammatory process unchanged compared to 05/05/2022. Diffuse hypodensity right abdominal wall musculature and mild infiltrative change of the subcutaneous fat of the right abdomen.  Differential considerations include diffuse cellulitis, contusion, edema and changes associated with history of multiple paracenteses.  Correlation with clinical findings suggested.     Incomplete visualization moderate-large right pleural effusion, moderate-large volume ascites and findings consistent with underlying hepatic cirrhosis. Incomplete visualization 2 foci masslike pulmonary consolidation right lung measuring 4 cm in greatest dimension consistent with malignancy or round atelectasis.  Incomplete visualization mediastinal lymphadenopathy similar to prior studies dating back to 05/05/2022 with maximal short axis dimension 1.3 cm. Diffuse mucosal thickening throughout the stomach which may reflect incomplete distention, portal gastropathy diffuse gastritis.  Finding is new/worse compared to 01/04/2023.   Endoscopic evaluation would be helpful for further evaluation if clinically indicated. Diffuse mesenteric lymphadenopathy similar to prior studies dating back to 05/05/2022 with maximal short axis dimension of 12 mm. Diffuse infiltrative changes subcutaneous soft tissues right abdominal wall, diffuse fatty infiltrative change right abdominal wall musculature. No subcutaneous gas density or drainable fluid collection. Findings are consistent with contusion, edema, cellulitis, history of multiple paracenteses. Correlation with clinical findings suggested. Gynecomastia and stable subcutaneous 1.4 cm nodule medial aspect of the right breast compared to prior studies dating back to 05/05/2022. CT HEAD WO CONTRAST    Result Date: 2/10/2023  EXAMINATION: CT OF THE HEAD WITHOUT CONTRAST  2/10/2023 8:52 am TECHNIQUE: CT of the head was performed without the administration of intravenous contrast. Automated exposure control, iterative reconstruction, and/or weight based adjustment of the mA/kV was utilized to reduce the radiation dose to as low as reasonably achievable. COMPARISON: 01/28/2023 HISTORY: ORDERING SYSTEM PROVIDED HISTORY: HEAD TRAUMA, CLOSED, MILD, GCS >= 13, NO RISK FACTORS, NEURO EXAM NORMAL TECHNOLOGIST PROVIDED HISTORY: Has a \"code stroke\" or \"stroke alert\" been called? ->No Reason for exam:->ams Decision Support Exception - unselect if not a suspected or confirmed emergency medical condition->Emergency Medical Condition (MA) Reason for Exam:  Head trauma, closed, mild, GCS FINDINGS: BRAIN/VENTRICLES: There is no acute intracranial hemorrhage, mass effect or midline shift. No abnormal extra-axial fluid collection. The gray-white differentiation is maintained without evidence of an acute infarct. There is no evidence of hydrocephalus.  Cortical atrophy, white matter changes consistent with microvascular degenerative disease, atherosclerotic calcification distal internal carotid and vertebral arteries of uncertain hemodynamic significance. ORBITS: The visualized portion of the orbits demonstrate no acute abnormality. SINUSES: The visualized paranasal sinuses and mastoid air cells demonstrate no acute abnormality. SOFT TISSUES/SKULL:  No acute abnormality of the visualized skull or soft tissues. Dental tamera right upper incisor suggested. No noncontrast CT evidence of acute intracranial pathology or acute posttraumatic change Cortical atrophy, white matter changes consistent with microvascular degenerative disease, atherosclerotic calcification distal internal carotid and vertebral arteries of uncertain hemodynamic significance, dental tamera right upper incisor. XR CHEST PORTABLE    Result Date: 2/10/2023  EXAMINATION: ONE XRAY VIEW OF THE CHEST 2/10/2023 8:51 am COMPARISON: 01/30/2023, 01/23/2023 HISTORY: ORDERING SYSTEM PROVIDED HISTORY: AMS TECHNOLOGIST PROVIDED HISTORY: Reason for exam:->AMS Reason for Exam: AMS FINDINGS: Right PICC with tip at the right atrium. Areas of consolidation in the right upper lung remain. Small right basilar pleural effusion remains. The left lung is clear. Heart size is normal.     Right PICC with tip in the region of the right atrium. Right upper lung consolidation consolidation remains similar to 01/23/2023. Small right pleural effusion. IR US GUIDED PARACENTESIS    Result Date: 2/10/2023  PROCEDURE: PARACENTESIS WITHOUT IMAGE GUIDANCE US ABDOMEN LIMITED 2/10/2023 HISTORY: ORDERING SYSTEM PROVIDED HISTORY: ascites TECHNOLOGIST PROVIDED HISTORY: Reason for exam:->ascites TECHNIQUE: Informed consent was obtained after a detailed explanation of the procedure including risks, benefits, and alternatives. Universal protocol was followed. A limited ultrasound of the abdomen was performed. The right abdomen was prepped and draped in sterile fashion and local anesthesia was achieved with lidocaine.   A 6 Maori Safe-T-Centesis catheter  was advanced into ascites and paracentesis was performed. Access was lost during the paracentesis and a 5 Georgian one-step sheathed needle was advanced back into the ascites and paracentesis was resumed. The patient tolerated the procedure well. FINDINGS: Limited ultrasound of the abdomen demonstrates ascites. 2 sonographic images were obtained. A total of 3125 cc of cloudy pink fluid was removed. 525 cc of the fluid was sent to the lab for analysis. Successful paracentesis. XR ABDOMEN FOR NG/OG/NE TUBE PLACEMENT    Result Date: 2/10/2023  EXAMINATION: ONE SUPINE XRAY VIEW(S) OF THE ABDOMEN 2/10/2023 6:58 pm COMPARISON: None. HISTORY: ORDERING SYSTEM PROVIDED HISTORY: Confirmation of course of NG/OG/NE tube and location of tip of tube TECHNOLOGIST PROVIDED HISTORY: Reason for exam:->Confirmation of course of NG/OG/NE tube and location of tip of tube Portable? ->Yes Reason for Exam: Confirmation of course of NG/OG/NE tube and location of tip of tube Additional signs and symptoms: ams FINDINGS: The tip of the NG/OG tube and side hole/port are in good position in the mid stomach. There is a central venous catheter with the tip in the right atrium. The OG/NG tube has been placed in good position. XR ABDOMEN FOR NG/OG/NE TUBE PLACEMENT    Result Date: 2/10/2023  EXAMINATION: ONE SUPINE XRAY VIEW(S) OF THE ABDOMEN 2/10/2023 11:46 am COMPARISON: 05/08/2022 HISTORY: ORDERING SYSTEM PROVIDED HISTORY: ng tube TECHNOLOGIST PROVIDED HISTORY: Abd KUB Reason for exam:->ng tube Portable? ->Yes Reason for Exam: ng tube FINDINGS: Nasogastric tube tip within the stomach with side port at the level of the gastroesophageal junction. Advanced nasogastric tube by approximately 10 cm suggested to ensure adequate gastric decompression. Visualized abdomen demonstrates nonspecific partial distention of small bowel to maximal diameter of 2.6 cm. Visualized GI tract demonstrates no bowel wall edema or mucosal thickening.   No free intraperitoneal air or significant ascites in the visualized abdomen. Nasogastric tube tip in decompressed stomach with side port at the level of the gastroesophageal junction. Advanced nasogastric tube by approximately 10 cm suggested to ensure adequate gastric decompression.        Electronically signed by Lexa Baptiste MD on 2/11/2023 at 3:06 PM

## 2023-02-11 NOTE — PROGRESS NOTES
Patient removed his NG tube, Suzie Harvey CNP was informed and that patient was still confused but improving and that hes following commands better then the beginning of my shift. Per Suzie Harvey CNP its okay to keep NG tube out D/t patient will get agitated/aggressive after reinsertion.  Restraints removed at this time

## 2023-02-11 NOTE — CONSULTS
Chart reviewed full note to follow                      Name:  Denise Neal /Age/Sex: 1945  (78 y.o. male)   MRN & CSN:  [id number] & [id number] Admission Date/Time: 2/10/2023  8:09 AM   Location:  8224/9474-K PCP: Nereyda Fagan, [facility] Day: 2          Referring physician:  Cinthia Potts MD         Reason for consultation: Elevated troponin        Thanks for referral.    Information source: chart    CC; altered mental status      HPI:   Thank you for involving me in taking  care of Denise Neal who  is a 78 y. o.year  Old male  Presents with history of cirrhosis requiring paracentesis, hypertension, hyperlipidemia was recently in the hospital now gets readmitted with altered mental status and the troponin is elevated hence cardiology has been consulted                     Past medical history:    has a past medical history of Anxiety, Cirrhosis, alcoholic ([address] Diabetes mellitus ([address] GERD (gastroesophageal reflux disease), GERD (gastroesophageal reflux disease), Hyperlipidemia, Hypertension, Portal hypertension ([address] Pulmonary nodules, Sleep apnea, SOB (shortness of breath), and Thyroid disease. Past surgical history:   has a past surgical history that includes Cholecystectomy; Vasectomy; Colonoscopy; Endoscopy, colon, diagnostic; Foot surgery; Upper gastrointestinal endoscopy (N/A, 2022); Tonsillectomy; and Appendectomy. Social History:   reports that he quit smoking about 47 years ago. His smoking use included cigarettes. He has a 14.00 pack-year smoking history. He has never used smokeless tobacco. He reports that he does not currently use alcohol. He reports that he does not use drugs. Family history:  family history includes Diabetes in an other family member; Hypertension in his brother.     Allergies   Allergen Reactions    Lisinopril Swelling     Tongue swelling      Zetia [Ezetimibe] Swelling     Facial swelling    Atorvastatin     Codeine Other (See Comments) Blood pressure drops    Hydrochlorothiazide     Hydroxyzine      Fatigue and depressed    Lexapro [Escitalopram Oxalate]      Increased depression    Pravastatin        0.9 % sodium chloride infusion, Continuous  sodium chloride flush 0.9 % injection 5-40 mL, 2 times per day  sodium chloride flush 0.9 % injection 5-40 mL, PRN  0.9 % sodium chloride infusion, PRN  enoxaparin (LOVENOX) injection 40 mg, Daily  ondansetron (ZOFRAN-ODT) disintegrating tablet 4 mg, Q8H PRN   Or  ondansetron (ZOFRAN) injection 4 mg, Q6H PRN  polyethylene glycol (GLYCOLAX) packet 17 g, Daily PRN  acetaminophen (TYLENOL) tablet 650 mg, Q6H PRN   Or  acetaminophen (TYLENOL) suppository 650 mg, Q6H PRN  lactulose (CHRONULAC) 10 GM/15ML solution 20 g, TID  levothyroxine (SYNTHROID) tablet 50 mcg, Daily  nadolol (CORGARD) tablet 40 mg, Nightly  [Held by provider] furosemide (LASIX) tablet 40 mg, Daily  [Held by provider] spironolactone (ALDACTONE) tablet 100 mg, BID  rifAXIMin (XIFAXAN) tablet 550 mg, BID  pantoprazole (PROTONIX) injection 40 mg, Daily  vancomycin (VANCOCIN) intermittent dosing (placeholder), RX Placeholder    Current Facility-Administered Medications   Medication Dose Route Frequency Provider Last Rate Last Admin    0.9 % sodium chloride infusion   IntraVENous Continuous Malinda Carballo, DO 75 mL/hr at 02/11/23 0905 New Bag at 02/11/23 0905    sodium chloride flush 0.9 % injection 5-40 mL  5-40 mL IntraVENous 2 times per day MOHINDER Emery - CNP   10 mL at 02/11/23 0912    sodium chloride flush 0.9 % injection 5-40 mL  5-40 mL IntraVENous PRN MOHINDER Allen - CNP        0.9 % sodium chloride infusion   IntraVENous PRN MOHINDER Emery CNP        enoxaparin (LOVENOX) injection 40 mg  40 mg SubCUTAneous Daily Wilfred Cook APRN - CNP   40 mg at 02/11/23 0905    ondansetron (ZOFRAN-ODT) disintegrating tablet 4 mg  4 mg Oral Q8H PRN MOHINDER Emery - INGRID        Or    ondansetron (Bo Stinson) injection 4 mg  4 mg IntraVENous Q6H PRN Chika Breen, APRN - CNP        polyethylene glycol (GLYCOLAX) packet 17 g  17 g Oral Daily PRN Chika Breen, APRN - CNP        acetaminophen (TYLENOL) tablet 650 mg  650 mg Oral Q6H PRN [place] Jamshid, APRN - CNP        Or    acetaminophen (TYLENOL) suppository 650 mg  650 mg Rectal Q6H PRN [place] Jamshid, APRN - CNP        lactulose (CHRONULAC) 10 GM/15ML solution 20 g  20 g Oral TID [place] Jamshid, APRN - CNP   20 g at 02/11/23 0905    levothyroxine (SYNTHROID) tablet 50 mcg  50 mcg Oral Daily [place] Jamshid, APRN - CNP   50 mcg at 02/11/23 0616    nadolol (CORGARD) tablet 40 mg  40 mg Oral Nightly MOHINDER Allen CNP        [Held by provider] furosemide (LASIX) tablet 40 mg  40 mg Oral Daily [place] Jamshid, APRN - CNP   40 mg at 02/10/23 2145    [Held by provider] spironolactone (ALDACTONE) tablet 100 mg  100 mg Oral BID Chika Jamshid, APRN - CNP   100 mg at 02/10/23 2145    rifAXIMin (XIFAXAN) tablet 550 mg  550 mg Oral BID [place] Jamshid, APRN - CNP   550 mg at 02/11/23 0905    pantoprazole (PROTONIX) injection 40 mg  40 mg IntraVENous Daily Chika Jamshid, APRN - CNP   40 mg at 02/11/23 1111    vancomycin (VANCOCIN) intermittent dosing (placeholder)   Other RX Placeholder MOHINDER Naik CNP         Review of Systems: Altered mental status  Physical Examination:    /73   Pulse 87   Temp 98.1 °F (36.7 °C) (Tympanic)   Resp 17   Wt 209 lb (94.8 kg)   SpO2 99%   BMI 31.78 kg/m²      Wt Readings from Last 3 Encounters:   02/11/23 209 lb (94.8 kg)   02/08/23 218 lb (98.9 kg)   02/07/23 220 lb (99.8 kg)     Body mass index is 31.78 kg/m².       General Appearance: Fair  Head: normocephalic     Eyes: normal, noninjected conjunctiva    ENT: normal mucosa, noninjected throat, normal     NECK: No JVP  No thyromegaly        Cardiovascular: No thrills palpated   Auscultation: Normal S1 and S2, no murmur   carotid bruit no Abdominal Aorta no bruit    Respiratory:    Breath sounds diminished bilaterally    Extremities: Trace edema clubbing ,   no cyanosis    SKIN: Warm and well perfused, no pallor or cyanosis    Vascular exam:  Pedal Pulses: Palp bilaterally        Abdomen:  No masses or tenderness. No organomegaly noted. Neurological: Altered mental status  Lab Review   Recent Results (from the past 24 hour(s))   PLEURAL/PERITONEAL FLUID PANEL    Collection Time: 02/10/23  2:45 PM   Result Value Ref Range    Glucose, Fluid 114 >60 MG/DL    LD, Fluid 43 <200 IU/L    Protein, Fluid 1.3 G/DL    Fluid Type PERITONEAL INDEX    RBC, Fluid 3,000 /CU MM    WBC, Fluid 229 /CU MM    Neutrophil Count, Fluid 17 %    Lymphocytes, Body Fluid 41 %    Monocyte Count, Fluid 42 %    Mesothelial, Fluid 0 /100 WBC    Other Cells, Fluid 0    Culture, Body Fluid    Collection Time: 02/10/23  2:45 PM    Specimen: PERITONEAL   Result Value Ref Range    Specimen PERITONEAL     Special Requests NONE     Culture Prelim Report No growth to date    TSH    Collection Time: 02/10/23  6:01 PM   Result Value Ref Range    TSH, High Sensitivity 2.410 0.270 - 4.20 uIu/ml   C-Reactive Protein    Collection Time: 02/10/23  6:01 PM   Result Value Ref Range    CRP High Sensitivity 19.1 (H) <1.2 mg/L   Salicylate    Collection Time: 02/10/23  6:01 PM   Result Value Ref Range    Salicylate Lvl <9.4 (L) 15 - 30 MG/DL    DOSE AMOUNT DOSE AMT. GIVEN - unknown     DOSE TIME DOSE TIME GIVEN - unknown    Acetaminophen Level    Collection Time: 02/10/23  6:01 PM   Result Value Ref Range    Acetaminophen Level <5.0 (L) 15 - 30 ug/ml    DOSE AMOUNT DOSE AMT.  GIVEN - unknown     DOSE TIME DOSE TIME GIVEN - unknown    Troponin    Collection Time: 02/10/23  6:01 PM   Result Value Ref Range    Troponin T 0.021 (H) <0.01 NG/ML   Procalcitonin    Collection Time: 02/10/23  6:01 PM   Result Value Ref Range    Procalcitonin 0.125    Vancomycin Level, Random    Collection Time: 02/10/23 6:01 PM   Result Value Ref Range    Vancomycin Rm 17.1 UG/ML    DOSE AMOUNT DOSE AMT.  GIVEN - UNKNOWN     DOSE TIME DOSE TIME GIVEN - UNKNOWN    Sedimentation Rate    Collection Time: 02/10/23 10:00 PM   Result Value Ref Range    Sed Rate 43 (H) 0 - 20 MM/HR   Ammonia    Collection Time: 02/11/23  5:50 AM   Result Value Ref Range    Ammonia 74 (H) 16 - 60 UMOL/L   C-Reactive Protein    Collection Time: 02/11/23  5:50 AM   Result Value Ref Range    CRP High Sensitivity 19.4 (H) <5.0 mg/L   CBC with Auto Differential    Collection Time: 02/11/23  5:50 AM   Result Value Ref Range    WBC 7.9 4.0 - 10.5 K/CU MM    RBC 3.62 (L) 4.6 - 6.2 M/CU MM    Hemoglobin 11.3 (L) 13.5 - 18.0 GM/DL    Hematocrit 34.2 (L) 42 - 52 %    MCV 94.5 78 - 100 FL    MCH 31.2 (H) 27 - 31 PG    MCHC 33.0 32.0 - 36.0 %    RDW 14.4 11.7 - 14.9 %    Platelets 491 591 - 748 K/CU MM    MPV 9.6 7.5 - 11.1 FL    Differential Type AUTOMATED DIFFERENTIAL     Segs Relative 68.3 (H) 36 - 66 %    Lymphocytes % 11.2 (L) 24 - 44 %    Monocytes % 13.0 (H) 0 - 4 %    Eosinophils % 5.9 (H) 0 - 3 %    Basophils % 1.3 (H) 0 - 1 %    Segs Absolute 5.4 K/CU MM    Lymphocytes Absolute 0.9 K/CU MM    Monocytes Absolute 1.0 K/CU MM    Eosinophils Absolute 0.5 K/CU MM    Basophils Absolute 0.1 K/CU MM    Nucleated RBC % 0.0 %    Total Nucleated RBC 0.0 K/CU MM    Total Immature Neutrophil 0.02 K/CU MM    Immature Neutrophil % 0.3 0 - 0.43 %   Comprehensive Metabolic Panel w/ Reflex to MG    Collection Time: 02/11/23  5:50 AM   Result Value Ref Range    Sodium 139 135 - 145 MMOL/L    Potassium 4.4 3.5 - 5.1 MMOL/L    Chloride 104 99 - 110 mMol/L    CO2 25 21 - 32 MMOL/L    BUN 37 (H) 6 - 23 MG/DL    Creatinine 2.0 (H) 0.9 - 1.3 MG/DL    Est, Glom Filt Rate 34 (L) >60 mL/min/1.73m2    Glucose 93 70 - 99 MG/DL    Calcium 9.3 8.3 - 10.6 MG/DL    Albumin 2.7 (L) 3.4 - 5.0 GM/DL    Total Protein 6.3 (L) 6.4 - 8.2 GM/DL    Total Bilirubin 0.7 0.0 - 1.0 MG/DL    ALT 24 10 - 40 U/L    AST 35 15 - 37 IU/L    Alkaline Phosphatase 90 40 - 128 IU/L    Anion Gap 10 4 - 16   Protime-INR    Collection Time: 02/11/23  5:50 AM   Result Value Ref Range    Protime 13.4 11.7 - 14.5 SECONDS    INR 1.04 INDEX   Troponin    Collection Time: 02/11/23  5:50 AM   Result Value Ref Range    Troponin T 0.057 (H) <0.01 NG/ML   Vancomycin Level, Random    Collection Time: 02/11/23  5:50 AM   Result Value Ref Range    Vancomycin Rm 27.2 UG/ML    DOSE AMOUNT DOSE AMT. GIVEN - 1 gm     DOSE TIME DOSE TIME GIVEN - 10th at 2100    Troponin    Collection Time: 02/11/23 11:20 AM   Result Value Ref Range    Troponin T 0.064 (H) <0.01 NG/ML           Assessment/Recommendations:     -Elevated troponin not in a positive range probably type II elevation  -EVA done on last visit showed vegetation on the tricuspid valve         Meme Avalos MD, 2/11/2023 2:00 PM       Please note this report has been partially produced using speech recognition software and may contain errors related to that system including errors in grammar, punctuation, and spelling, as well as words and phrases that may be inappropriate. If there are any questions or concerns please feel free to contact the dictating provider for clarification.

## 2023-02-11 NOTE — CONSULTS
[address] [facility]             MD Cristobal Elder Officer, MD Catrachito Langston, APRN-CNP             [address] [address], [address] [facility]             [phone] fax [phone]           Reason for Consult:  PSE    Primary Care Physician:  Kate Choe MD    History Obtained From:  patient    CC: confusion    HISTORY OF PRESENT ILLNESS:              The patient is a 78 y.o.  male known to me. Catalina see my consult note of 1/31/23 for details of past history. he was admitted now with reduced sensorium and elevated ammonia level, despite lactulose and Xifaxan at home    Past Medical History:        Diagnosis Date    Anxiety     Cirrhosis, alcoholic (HCC)     Diabetes mellitus ([address]     GERD (gastroesophageal reflux disease) 2000    GERD (gastroesophageal reflux disease)     Hyperlipidemia     Hypertension     Portal hypertension ([address]     Pulmonary nodules     Sleep apnea     SOB (shortness of breath)     Thyroid disease        Past Surgical History:        Procedure Laterality Date    APPENDECTOMY      CHOLECYSTECTOMY      COLONOSCOPY      ENDOSCOPY, COLON, DIAGNOSTIC      FOOT SURGERY      needle removed,not sure which foot, per wife. TONSILLECTOMY      UPPER GASTROINTESTINAL ENDOSCOPY N/A 1/19/2022    EGD BIOPSY performed by Will Grayson MD at [address] [place] [address]         Medications Prior to Admission:    Prior to Admission medications    Medication Sig Start Date End Date Taking?  Authorizing Provider   spironolactone (ALDACTONE) 100 MG tablet Take 1 tablet by mouth 2 times daily 2/8/23   Hannah Kinney MD   rifAXIMin Robbert Zack) 550 MG tablet Take 1 tablet by mouth 2 times daily 1/11/23   Saint Schmid, MD   thiamine 100 MG tablet Take 1 tablet by mouth daily  Patient taking differently: Take 100 mg by mouth daily OTC 1/12/23   Saint Schmid, MD   furosemide (LASIX) 40 MG tablet Take 1 tablet by mouth daily 11/22/22 Zoey Ramirez MD   levothyroxine (SYNTHROID) 50 MCG tablet Take 1 tablet by mouth Daily 10/14/22 4/12/23  Zoey Ramirez MD   albuterol sulfate HFA (PROVENTIL;VENTOLIN;PROAIR) 108 (90 Base) MCG/ACT inhaler INHALE 2 PUFFS BY MOUTH 4 TIMES DAILY AS NEEDED FOR WHEEZING 10/14/22   Zoey Ramierz MD   DULoxetine (CYMBALTA) 60 MG extended release capsule TAKE 1 CAPSULE BY MOUTH ONCE DAILY  Patient taking differently: Take 60 mg by mouth daily TAKE 1 CAPSULE BY MOUTH ONCE DAILY 10/14/22 8/18/23  Zoey Ramirez MD   betamethasone valerate (VALISONE) 0.1 % lotion Apply topically 2 times daily prn scalp rash 10/14/22   Zoey Ramirez MD   nadolol (CORGARD) 40 MG tablet Take 1 tablet by mouth in the morning. Patient taking differently: No sig reported 7/20/22 2/10/23  MOHINDER Velasco - CNP   lactulose [facility]) 10 GM/15ML solution Take 30 mLs by mouth 3 times daily Hold if having diarrhea 3/21/22   Elsie Jernigan MD   Multiple Vitamin (MULTI VITAMIN MENS PO) Take 1 tablet by mouth daily    Historical Provider, MD   ESOMEPRAZOLE MAGNESIUM PO Take 20 mg by mouth daily OTC    Historical Provider, MD   Hope-3 1000 MG CAPS Take 2,000 mg by mouth daily    Historical Provider, MD       Allergies: Allergies   Allergen Reactions    Lisinopril Swelling     Tongue swelling      Zetia [Ezetimibe] Swelling     Facial swelling    Atorvastatin     Codeine Other (See Comments)     Blood pressure drops    Hydrochlorothiazide     Hydroxyzine      Fatigue and depressed    Lexapro [Escitalopram Oxalate]      Increased depression    Pravastatin    . Social History:    TOBACCO:  No  ETOH:  previously--- reportedly none in months    Family History: reviewed and positives included in HPI- all other pertinent GI family history negative      REVIEW OF SYSTEMS: see HPI for positives and pertinent negatives.  All other systems reviewed and are negative    PHYSICAL EXAM:    Vitals:  /73 Pulse 68   Temp 98.1 °F (36.7 °C) (Tympanic)   Resp 14   Wt 209 lb (94.8 kg)   SpO2 99%   BMI 31.78 kg/m²   CONSTITUTIONAL: alert, cooperative, no apparent distress,   EYES:  pupils equal, round and reactive to light and sclera clear  ENT:  normocepalic, without obvious abnormality  NECK:  supple, symmetrical, trachea midline  HEMATOLOGIC/LYMPHATICS:  no cervical lymphadenopathy and no supraclavicular lymphadenopathy  LUNGS:  clear to auscultation  CARDIOVASCULAR:  regular rate and rhythm and no murmur noted  ABDOMEN:  Soft, non-tender with normal bowel sounds. 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- MELD currently 14 (but driven by elevated creatinine  2) stage 2 PSE - probably precipitated by CANDIE  3) history of staph epi endocarditis  4) CANDIE  5) refractory ascites with hepatic hydrothorax (chylous)-- reasonably managed recently with diuretics, paracentesis every 2 weeks and thoracentesis prn--difficult problem- not a candidate for TIPS due to PSE and MELD likely too low for transplant, although have [place] [id number] [employer] [place]  6) thickened stomach on CT- asymptomatic, likely due to portal hypertensive gastropathy and erosive gastritis seen at EGD 1 year ago-repeat endoscopy not planned at this point        RECOMMENDATIONS:  1) repeat paracentesis done yesterday to rule out SBP as source of the PSE- WBC count not suggestive of SBP  2) continue lactulose and adjust dose to produce 2-4 BM/d  3) continue Xifaxan, Nadolol  4) consider nephrology consult and hold Lasix and Aldactone until renal function improves or seen by nephrology      Evonne Yi.  Marcus Campos M.D

## 2023-02-11 NOTE — CONSULTS
[address] [facility]  consulted by MOHINDER Tijerina for monitoring and adjustment. Indication for treatment: Vancomycin indication: Other: Endocarditis  Goal trough: Trough Goal: 15-20 mcg/mL  AUC/TRES: 400-600    Risk Factors for MRSA Identified:   Hospitalization within the past 90 days    Pertinent Laboratory Values:   Temp Readings from Last 3 Encounters:   02/10/23 97.8 °F (36.6 °C) (Oral)   02/07/23 98.4 °F (36.9 °C) (Temporal)   02/06/23 97.9 °F (36.6 °C) (Infrared)     Recent Labs     02/10/23  0853   WBC 8.2   LACTATE 1.1     Recent Labs     02/10/23  0853   BUN 40*   CREATININE 2.1*     Estimated Creatinine Clearance: 35 mL/min (A) (based on SCr of 2.1 mg/dL (H)). No intake or output data in the 24 hours ending 02/10/23 1949    Pertinent Cultures:   Date    Source    Results  02/03/23   Blood    Staph Epidermidis      Vancomycin level:   TROUGH:    Recent Labs     02/08/23  1245   VANCOTROUGH 27.0*     RANDOM:    Recent Labs     02/10/23  1801   VANCORANDOM 17.1       Assessment:  HPI: Cirrhosis, Hepatic Hydrothorax, Hep. Encephalopathy, Staph epidermidis  SCr, BUN, and urine output: CANDIE  Day(s) of therapy: 1st day tonight( but 4th dose since 1/31/23  Vancomycin concentration: 17.1    Plan:  A dose of 1 gm X 1 , then additional doses when random level is below 20 again. Vancomycin regimen to continue until March 13, 2023  Pharmacy will continue to monitor patient and adjust therapy as indicated    Rogelio 3 12th @ 0600    Thank you for the consult.   Jean-Claude Byrd, [facility]  2/10/2023 7:49 PM

## 2023-02-12 LAB
ANION GAP SERPL CALCULATED.3IONS-SCNC: 10 MMOL/L (ref 4–16)
BUN SERPL-MCNC: 36 MG/DL (ref 6–23)
CALCIUM SERPL-MCNC: 9 MG/DL (ref 8.3–10.6)
CHLORIDE BLD-SCNC: 102 MMOL/L (ref 99–110)
CO2: 25 MMOL/L (ref 21–32)
CREAT SERPL-MCNC: 2.2 MG/DL (ref 0.9–1.3)
CRP SERPL HS-MCNC: 19.9 MG/L
DOSE AMOUNT: NORMAL
DOSE TIME: NORMAL
GFR SERPL CREATININE-BSD FRML MDRD: 30 ML/MIN/1.73M2
GLUCOSE SERPL-MCNC: 90 MG/DL (ref 70–99)
POTASSIUM SERPL-SCNC: 4 MMOL/L (ref 3.5–5.1)
PROCALCITONIN SERPL-MCNC: 0.14 NG/ML
SODIUM BLD-SCNC: 137 MMOL/L (ref 135–145)
TROPONIN T: 0.06 NG/ML
VANCOMYCIN RANDOM: 21.7 UG/ML

## 2023-02-12 PROCEDURE — 80202 ASSAY OF VANCOMYCIN: CPT

## 2023-02-12 PROCEDURE — 6370000000 HC RX 637 (ALT 250 FOR IP): Performed by: NURSE PRACTITIONER

## 2023-02-12 PROCEDURE — 76937 US GUIDE VASCULAR ACCESS: CPT

## 2023-02-12 PROCEDURE — 6370000000 HC RX 637 (ALT 250 FOR IP): Performed by: STUDENT IN AN ORGANIZED HEALTH CARE EDUCATION/TRAINING PROGRAM

## 2023-02-12 PROCEDURE — 6360000002 HC RX W HCPCS: Performed by: NURSE PRACTITIONER

## 2023-02-12 PROCEDURE — 36415 COLL VENOUS BLD VENIPUNCTURE: CPT

## 2023-02-12 PROCEDURE — 94761 N-INVAS EAR/PLS OXIMETRY MLT: CPT

## 2023-02-12 PROCEDURE — 84145 PROCALCITONIN (PCT): CPT

## 2023-02-12 PROCEDURE — 80048 BASIC METABOLIC PNL TOTAL CA: CPT

## 2023-02-12 PROCEDURE — 2580000003 HC RX 258: Performed by: NURSE PRACTITIONER

## 2023-02-12 PROCEDURE — 87086 URINE CULTURE/COLONY COUNT: CPT

## 2023-02-12 PROCEDURE — 99232 SBSQ HOSP IP/OBS MODERATE 35: CPT | Performed by: INTERNAL MEDICINE

## 2023-02-12 PROCEDURE — 1200000000 HC SEMI PRIVATE

## 2023-02-12 PROCEDURE — 2580000003 HC RX 258: Performed by: INTERNAL MEDICINE

## 2023-02-12 PROCEDURE — APPNB30 APP NON BILLABLE TIME 0-30 MINS: Performed by: NURSE PRACTITIONER

## 2023-02-12 PROCEDURE — 86140 C-REACTIVE PROTEIN: CPT

## 2023-02-12 RX ORDER — PANTOPRAZOLE SODIUM 40 MG/1
40 TABLET, DELAYED RELEASE ORAL
Status: DISCONTINUED | OUTPATIENT
Start: 2023-02-12 | End: 2023-02-15 | Stop reason: HOSPADM

## 2023-02-12 RX ADMIN — LACTULOSE 20 G: 10 SOLUTION ORAL at 13:35

## 2023-02-12 RX ADMIN — SODIUM CHLORIDE, PRESERVATIVE FREE 10 ML: 5 INJECTION INTRAVENOUS at 20:15

## 2023-02-12 RX ADMIN — LEVOTHYROXINE SODIUM 50 MCG: 0.05 TABLET ORAL at 06:43

## 2023-02-12 RX ADMIN — VANCOMYCIN HYDROCHLORIDE 750 MG: 750 INJECTION, POWDER, LYOPHILIZED, FOR SOLUTION INTRAVENOUS at 17:25

## 2023-02-12 RX ADMIN — ENOXAPARIN SODIUM 40 MG: 100 INJECTION SUBCUTANEOUS at 09:52

## 2023-02-12 RX ADMIN — PANTOPRAZOLE SODIUM 40 MG: 40 TABLET, DELAYED RELEASE ORAL at 09:52

## 2023-02-12 RX ADMIN — LACTULOSE 20 G: 10 SOLUTION ORAL at 20:15

## 2023-02-12 RX ADMIN — RIFAXIMIN 550 MG: 550 TABLET ORAL at 09:51

## 2023-02-12 RX ADMIN — LACTULOSE 20 G: 10 SOLUTION ORAL at 09:51

## 2023-02-12 RX ADMIN — RIFAXIMIN 550 MG: 550 TABLET ORAL at 20:15

## 2023-02-12 ASSESSMENT — PAIN SCALES - GENERAL: PAINLEVEL_OUTOF10: 0

## 2023-02-12 ASSESSMENT — PAIN SCALES - WONG BAKER: WONGBAKER_NUMERICALRESPONSE: 0

## 2023-02-12 NOTE — CONSULTS
Consult completed. Nexiva 20g 1.00 inch catheter inserted via ultrasound in patient's RFA. Brisk blood return noted and catheter flushes with ease. Protective sleeve placed over line. Patient tolerated well. Consult IV/PICC team for any questions or if patient's needs change.

## 2023-02-12 NOTE — PROGRESS NOTES
Cardiology Progress Note     Today's Plan sign off    Admit Date:  2/10/2023    Consult reason/ Seen today for: elevated troponin     Subjective and  Overnight Events:  having intermittent episodes of confusion. Denies dizziness or lightheadedness. Wife at bedside update given    Telemetry: Sinus rhythm    Assessment / Plan:     Elevated troponin: Probable type II demand leak in the setting of CKD denies chest pain. 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 o.year old who is admitted for  Chief Complaint   Patient presents with    Altered Mental Status        Past medical history:    has a past medical history of Anxiety, Cirrhosis, alcoholic ([address] Diabetes mellitus ([address] GERD (gastroesophageal reflux disease), GERD (gastroesophageal reflux disease), Hyperlipidemia, Hypertension, Portal hypertension ([address] Pulmonary nodules, Sleep apnea, SOB (shortness of breath), and Thyroid disease. Past surgical history:   has a past surgical history that includes Cholecystectomy; Vasectomy; Colonoscopy; Endoscopy, colon, diagnostic; Foot surgery; Upper gastrointestinal endoscopy (N/A, 1/19/2022); Tonsillectomy; and Appendectomy. Social History:   reports that he quit smoking about 47 years ago. His smoking use included cigarettes. He has a 14.00 pack-year smoking history. He has never used smokeless tobacco. He reports that he does not currently use alcohol. He reports that he does not use drugs. Family history:  family history includes Diabetes in an other family member; Hypertension in his brother.     Allergies   Allergen Reactions    Lisinopril Swelling     Tongue swelling      Zetia [Ezetimibe] Swelling     Facial swelling    Atorvastatin     Codeine Other (See Comments)     Blood pressure drops    Hydrochlorothiazide     Hydroxyzine      Fatigue and depressed    Lexapro [Escitalopram Oxalate]      Increased depression    Pravastatin        Review of Systems:   All 14 systems were reviewed and are negative  Except for the positive findings which are documented     /74   Pulse 70   Temp 97.6 °F (36.4 °C)   Resp 17   Wt 209 lb (94.8 kg)   SpO2 95%   BMI 31.78 kg/m²     Intake/Output Summary (Last 24 hours) at 2/12/2023 0920  Last data filed at 2/12/2023 0000  Gross per 24 hour   Intake --   Output 350 ml   Net -350 ml       Physical Exam:  Physical Exam  Vitals reviewed. Eyes:      Extraocular Movements: Extraocular movements intact. Cardiovascular:      Rate and Rhythm: Normal rate and regular rhythm. Pulses: Normal pulses. Pulmonary:      Effort: Pulmonary effort is normal. No respiratory distress. Breath sounds: Normal breath sounds. No wheezing. Abdominal:      General: There is no distension. Tenderness: There is no abdominal tenderness. Skin:     General: Skin is warm and dry. Capillary Refill: Capillary refill takes less than 2 seconds. Neurological:      Mental Status: He is alert. He is disoriented. Psychiatric:         Mood and Affect: Mood is anxious.         Medications:    pantoprazole  40 mg Oral QAM AC    lidocaine PF  5 mL IntraDERmal Once    sodium chloride flush  5-40 mL IntraVENous 2 times per day    sodium chloride flush  5-40 mL IntraVENous 2 times per day    enoxaparin  40 mg SubCUTAneous Daily    lactulose  20 g Oral TID    levothyroxine  50 mcg Oral Daily    nadolol  40 mg Oral Nightly    [Held by provider] furosemide  40 mg Oral Daily    [Held by provider] spironolactone  100 mg Oral BID    rifAXIMin  550 mg Oral BID    vancomycin (VANCOCIN) intermittent dosing (placeholder)   Other RX Placeholder      sodium chloride sodium chloride       sodium chloride flush, sodium chloride, sodium chloride flush, sodium chloride, ondansetron **OR** ondansetron, polyethylene glycol, acetaminophen **OR** acetaminophen    Lab Data:  CBC:   Recent Labs     02/10/23  0853 23  0550   WBC 8.2 7.9   HGB 10.8* 11.3*   HCT 32.3* 34.2*   MCV 93.6 94.5    248     BMP:   Recent Labs     02/10/23  0853 23  0550 23  0422    139 137   K 4.5 4.4 4.0    104 102   CO2 25 25 25   BUN 40* 37* 36*   CREATININE 2.1* 2.0* 2.2*     PT/INR:   Recent Labs     02/10/23  0853 23  0550   PROTIME 12.8 13.4   INR 0.99 1.04     BNP:    Recent Labs     02/10/23  0853   PROBNP 342.4*     TROPONIN:   Recent Labs     23  0550 23  1120 23  2259   TROPONINT 0.057* 0.064* 0.062*              Impression:  Principal Problem:    AMS (altered mental status)  Resolved Problems:    * No resolved hospital problems. *       All labs, medications and tests reviewed by myself, continue all other medications of all above medical condition listed as is except for changes mentioned above. Thank you   Please call with questions. Electronically signed by MOHINDER Pena CNP on 2023 at 9:20 AM  I have seen ,spoken to  and examined this patient personally, independently of the SHANITA. I have reviewed the hospital care given to date and reviewed all pertinent labs and imaging. I have spoken with patient, nursing staff and provided written and verbal instructions . The above note has been reviewed     CARDIOLOGY ATTENDING ADDENDUM    HPI:  I have reviewed the above HPI  And agree with above     Pulse Range: Pulse  Av  Min: 67  Max: 84    Blood Presuure Range:  Systolic (67MSA), YJH:803 , Min:102 , XGY:727   ; Diastolic (81WFZ), JYE:85, Min:64, Max:83      Pulse ox Range: SpO2  Av %  Min: 95 %  Max: 100 %    24hr I & O:    Intake/Output Summary (Last 24 hours) at 2023 1234  Last data filed at 2023 0000  Gross per 24 hour   Intake --   Output 350 ml   Net -350 ml         /69   Pulse 67   Temp 97.8 °F (36.6 °C) (Oral)   Resp 13   Wt 209 lb (94.8 kg)   SpO2 99%   BMI 31.78 kg/m²       Physical Exam:  General:  Awake, alert, NAD  Head:normal  Eye:normal  Neck:  No JVD   Chest:  Clear to auscultation, respiration easy  Cardiovascular:  RRR S1S2  Abdomen:   nontender  Extremities: Trace edema  Pulses; palpable  Neuro: grossly normal      MEDICAL DECISION MAKING;    Pt assessed , chart reviewed, patient examined examined , all available data was reviewed, following is the plan which was discussed with SHANITA as well:    -Initial consult was for elevated troponin which is probably secondary to CKD type II elevation patient has no cardiac complaints  -Patient was noted to have large vegetation on transthoracic echo earlier this month and has been on vancomycin it was noted on tricuspid valve  -Patient is also noted to have liver cirrhosis which has been a established diagnosis and has ascites and pleural effusions  -Hypothyroidism patient is on Synthroid        Lang Castaneda MD [facility]

## 2023-02-12 NOTE — PROGRESS NOTES
[address] [facility]  consulted by MOHINDER Herron for monitoring and adjustment. Indication for treatment: Vancomycin indication: Other: Endocarditis  Goal trough: Trough Goal: 15-20 mcg/mL  AUC/TRES: 400-600    Risk Factors for MRSA Identified:   Hospitalization within the past 90 days    Pertinent Laboratory Values:   Temp Readings from Last 3 Encounters:   02/12/23 97.8 °F (36.6 °C) (Oral)   02/07/23 98.4 °F (36.9 °C) (Temporal)   02/06/23 97.9 °F (36.6 °C) (Infrared)     Recent Labs     02/10/23  0853 02/11/23  0550   WBC 8.2 7.9   LACTATE 1.1  --        Recent Labs     02/10/23  0853 02/11/23  0550 02/12/23  0422   BUN 40* 37* 36*   CREATININE 2.1* 2.0* 2.2*       Estimated Creatinine Clearance: 32 mL/min (A) (based on SCr of 2.2 mg/dL (H)). Intake/Output Summary (Last 24 hours) at 2/12/2023 1456  Last data filed at 2/12/2023 0000  Gross per 24 hour   Intake --   Output 350 ml   Net -350 ml         Pertinent Cultures:   Date    Source    Results  02/03/23   Blood    Staph Epidermidis      Vancomycin level:   TROUGH:    No results for input(s): VANCOTROUGH in the last 72 hours. RANDOM:    Recent Labs     02/10/23  1801 02/11/23  0550 02/12/23  0422   VANCORANDOM 17.1 27.2 21.7         Assessment:  HPI: Patient is 78 yom on vancomycin 1000mg IV q24h for staphylococcus epidermidis bacteremia from bacterial peritonitis with native TV endocarditis completing a 6 week course of vancomycin IV due to end on 3/13/2023. PMH Cirrhosis, Hepatic Hydrothorax, Hep. Encephalopathy. SCr, BUN, and urine output: CANDIE, continued elevated Scr and BUN (baseline 1.2?)  Day(s) of therapy: Planned vanco through 3/13  Vancomycin concentration:   2/10 @ 1801 = 17.1  Patient received vanco 1000mg IV x 1 2/10 @ 2227  2/11 @ 0550 = 27.2  2/12 2 0422 - 21.7 (~ 30 hours post dose)    Plan:  Patient received vancomycin 1000mg IV x 1 2/10 at 2200. Unclear when last home dose of vanco was administered. Vanco level this AM was 21.7. Will schedule vanco 750mg IV x 1 at 1800 this evening and continue dosing by levels  Vancomycin regimen to continue until March 13, 2023  Pharmacy will continue to monitor patient and adjust therapy as indicated    Rogelio 3 2/14 @ 0600    Thank you for the consult.   Gagandeep Brian [facility]  2/12/2023 2:56 PM

## 2023-02-12 NOTE — PROGRESS NOTES
Nephrology Progress Note        [address], [address] [address]  Phone: [phone]  Office Hours: 8:30AM - 4:30PM  Monday - Friday 2/12/2023 6:48 AM  Subjective:   Admit Date: 2/10/2023  PCP: Zachariah Pinto MD  Interval History:     Diet: Diet NPO Exceptions are: Sips of Water with Meds      Data:   Scheduled Meds:   lidocaine PF  5 mL IntraDERmal Once    sodium chloride flush  5-40 mL IntraVENous 2 times per day    sodium chloride flush  5-40 mL IntraVENous 2 times per day    enoxaparin  40 mg SubCUTAneous Daily    lactulose  20 g Oral TID    levothyroxine  50 mcg Oral Daily    nadolol  40 mg Oral Nightly    [Held by provider] furosemide  40 mg Oral Daily    [Held by provider] spironolactone  100 mg Oral BID    rifAXIMin  550 mg Oral BID    pantoprazole  40 mg IntraVENous Daily    vancomycin (VANCOCIN) intermittent dosing (placeholder)   Other RX Placeholder     Continuous Infusions:   sodium chloride      sodium chloride       PRN Meds:sodium chloride flush, sodium chloride, sodium chloride flush, sodium chloride, ondansetron **OR** ondansetron, polyethylene glycol, acetaminophen **OR** acetaminophen  I/O last 3 completed shifts:  In: -   Out: 400 [Urine:400]  I/O this shift:  In: -   Out: 350 [Urine:350]    Intake/Output Summary (Last 24 hours) at 2/12/2023 0648  Last data filed at 2/12/2023 0000  Gross per 24 hour   Intake --   Output 350 ml   Net -350 ml       CBC:   Recent Labs     02/10/23  0853 02/11/23  0550   WBC 8.2 7.9   HGB 10.8* 11.3*    248       BMP:    Recent Labs     02/10/23  0853 02/11/23  0550 02/12/23  0422    139 137   K 4.5 4.4 4.0    104 102   CO2 25 25 25   BUN 40* 37* 36*   CREATININE 2.1* 2.0* 2.2*   GLUCOSE 108* 93 90     Hepatic:   Recent Labs     02/10/23  0853 02/11/23  0550   AST 31 35   ALT 22 24   BILITOT 0.5 0.7   ALKPHOS 90 90     Troponin: No results for input(s): TROPONINI in the last 72 hours.   BNP: No results for input(s): BNP in the last 72 hours. Lipids: No results for input(s): CHOL, HDL in the last 72 hours. Invalid input(s): LDLCALCU  ABGs:   Lab Results   Component Value Date/Time    PO2ART 73 06/23/2022 11:00 AM    XJJ8BNY 63.0 06/23/2022 11:00 AM     INR:   Recent Labs     02/10/23  0853 02/11/23  0550   INR 0.99 1.04       Objective:   Vitals: /74   Pulse 70   Temp 97.6 °F (36.4 °C)   Resp 17   Wt 209 lb (94.8 kg)   SpO2 95%   BMI 31.78 kg/m²   General appearance: ain no acute distress  HEENT: normocephalic, atraumatic,   Neck: supple, trachea midline  Lungs:  breathing comfortably on room air  Heart[de-identified] regular rate and rhythm,   Extremities: extremities atraumatic, no cyanosis or edema      MEDICAL DECISION MAKING     CANDIE  CKD3  Decompensated cirrhosis with ascites and pleural effusions  Hepatic encephaloptahy     Suggest:  -Cr 2.2 now, from 2  -Prefer avoiding HD, which will affect his quality of life.   If able to avoid vancomycin then please do so  -Avoid nephrotoxins  -Hold diuretics for now please                  Electronically signed by Titi Campos DO on 2/12/2023 at 6:48 AM    [facility]  MD Sabiha Parry DO [address],  [address]  PHONE: [phone]  FAX: [phone]

## 2023-02-12 NOTE — PROGRESS NOTES
During bedside report Patient had pulled his PICC line out half way, This nurse then removed the picc line. PICC nurse consulted and agreed to insert a PIV incase this patient pulls it out again.

## 2023-02-12 NOTE — PLAN OF CARE
Problem: Safety - Violent/Self-destructive Restraint  Goal: Remains free of injury from restraints (Restraint for Violent/Self-Destructive Behavior)  Description: INTERVENTIONS:  1. Determine that de-escalation and other, less restrictive measures have been tried or would not be effective before applying the restraint  2. Identify and document the criteria for restraint  3. Evaluate the patient's condition at the time of restraint application  4. Inform patient/family regarding the reason for restraint/seclusion  5. Q2H: Monitor comfort, nutrition and hydration needs  6. Q15M: Perform safety checks including skin, circulation, sensory, respiratory and psychological status  7. Ensure continuous observation  8. Identify and implement measures to help patient regain control, assess readiness for release and initiate progressive release per policy  Outcome: Progressing     Problem: Discharge Planning  Goal: Discharge to home or other facility with appropriate resources  Outcome: Progressing  Flowsheets (Taken 2/12/2023 3603)  Discharge to home or other facility with appropriate resources:   Identify barriers to discharge with patient and caregiver   Arrange for needed discharge resources and transportation as appropriate   Identify discharge learning needs (meds, wound care, etc)   Refer to discharge planning if patient needs post-hospital services based on physician order or complex needs related to functional status, cognitive ability or social support system     Problem: Safety - Medical Restraint  Goal: Remains free of injury from restraints (Restraint for Interference with Medical Device)  Description: INTERVENTIONS:  1. Determine that other, less restrictive measures have been tried or would not be effective before applying the restraint  2. Evaluate the patient's condition at the time of restraint application  3. Inform patient/family regarding the reason for restraint  4.  Q2H: Monitor safety, psychosocial status, comfort, nutrition and hydration  Outcome: Progressing     Problem: Safety - Adult  Goal: Free from fall injury  Outcome: Progressing  Flowsheets (Taken 2/12/2023 4108)  Free From Fall Injury: Instruct family/caregiver on patient safety

## 2023-02-12 NOTE — PROGRESS NOTES
Patient pulled his PIV out, Bucky QUINTERO perfectserved D/t him pulling out his PIV and PICC line within a few hours and not currently having any continuous fluids ordered and Vanco the only IV med that'll be given but not until tomorrow, Sanjuana Guan CNP said it was okay to hold off on inserting another PIV until the AM

## 2023-02-12 NOTE — PROGRESS NOTES
V2.0  [facility] Progress Note      Name:  Samantha Dowell /Age/Sex: 1945  (78 y.o. male)   MRN & CSN:  [id number] & [id number] Encounter Date/Time: 2023 3:06 PM EST    Location:  [address] [place] [address]-[place] PCP: Sharon Tobin, [address] Day: 3    Assessment and Plan:   Samantha Dowell is a 78 y.o. male with pmh of  who presents with AMS (altered mental status)    # Acute metabolic encephalopathy likely due to hepatic encephalopathy    CT of the head nonacute.   -Ammonia on presentation 125, improving  Currently on lactulose and rifaximin    #Decompensated alcohol liver cirrhosis  -Underwent paracentesis on 2/10/2023. Fluid analysis not suggestive for SBP.  -Evaluated by GI.  -Continue lactulose and rifaximin      #Tricuspid valve endocarditis  -As noted on EVA done on 2023.  -Patient had staph epi bacteremia. Positive blood cultures on 2023. Was getting IV antibiotics outpatient. Plan to continue IV vancomycin 1 g every 24 hour till 3/13/2023. #Staph epi bacteremia  -Continue IV vancomycin  -Was seen by ID last admission. Has been consulted       # Acute kidney injury on CKD stage III   -Dehydration versus hepatorenal versus vancomycin use  -Creatinine on presentation 2.2 mg/DL. Today baseline creatinine appears to be around 1.2 1.3 mg/DL. -Nephrology on board. Holding diuretics. # Elevated trop  -Likely demand ischemia and CKD  -Evaluated by cardiology. Other chronic medical conditions-resume home medications as contraindicated  # JENNIFER/Chronic respiratory failure on O2 2l per nc-on recent admission patient was managed on BiPAP nightly and as needed, per pts wife pt is not on home BIPAP, continue O2 @ 2l per nc, monitor pulse ox  # Essential hypertension -currently on naldalol and diuretics, BP stable  # Acquired hypothyroidism-resume levothyroxine, f/u tsh  # History right hydrothorax status post right thoracentesis on recent hospitalization.   Monitor need for repeat thoracentesis. Consider pulm consultation  # GERD on ppi  #Burning urination. UA unremarkable. Follow urine culture    Diet ADULT DIET; Regular; 3 carb choices (45 gm/meal); Low Fat/Low Chol/High Fiber/CARLOS; Low Sodium (2 gm)   DVT Prophylaxis [] Lovenox, []  Heparin, [] SCDs, [] Ambulation,  [] Eliquis, [] Xarelto  [] Coumadin   Code Status Full Code   Disposition From: Home  Expected Disposition: Home  Estimated Date of Discharge: 1 to 2 days  Patient requires continued admission due to CANDIE   Surrogate Decision Maker/ POA      Subjective:     Chief Complaint: Altered Mental Status       Fauzia Gee is a 78 y.o. male who presents with encephalopathy. He is hard of hearing. Complaining of difficulty urinating. States that his stream is very slow. Denies any other active complaints      Review of Systems:    Review of Systems    As above     Objective: Intake/Output Summary (Last 24 hours) at 2/12/2023 1107  Last data filed at 2/12/2023 0000  Gross per 24 hour   Intake --   Output 350 ml   Net -350 ml          Vitals:   Vitals:    02/12/23 0942   BP: 102/69   Pulse: 67   Resp: 13   Temp: 97.8 °F (36.6 °C)   SpO2:        Physical Exam:     General: NAD  Eyes: EOMI  ENT: neck supple  Cardiovascular: Regular rate. Respiratory: Clear to auscultation  Gastrointestinal: Soft, non tender  Genitourinary: no suprapubic tenderness  Musculoskeletal: No edema  Skin: warm, dry  Neuro: Alert. Psych: Mood appropriate.      Medications:   Medications:    pantoprazole  40 mg Oral QAM AC    lidocaine PF  5 mL IntraDERmal Once    sodium chloride flush  5-40 mL IntraVENous 2 times per day    sodium chloride flush  5-40 mL IntraVENous 2 times per day    enoxaparin  40 mg SubCUTAneous Daily    lactulose  20 g Oral TID    levothyroxine  50 mcg Oral Daily    nadolol  40 mg Oral Nightly    [Held by provider] furosemide  40 mg Oral Daily    [Held by provider] spironolactone  100 mg Oral BID    rifAXIMin  550 mg Oral BID    vancomycin (VANCOCIN) intermittent dosing (placeholder)   Other RX Placeholder      Infusions:    sodium chloride      sodium chloride       PRN Meds: sodium chloride flush, 5-40 mL, PRN  sodium chloride, , PRN  sodium chloride flush, 5-40 mL, PRN  sodium chloride, , PRN  ondansetron, 4 mg, Q8H PRN   Or  ondansetron, 4 mg, Q6H PRN  polyethylene glycol, 17 g, Daily PRN  acetaminophen, 650 mg, Q6H PRN   Or  acetaminophen, 650 mg, Q6H PRN      Labs      Recent Results (from the past 24 hour(s))   Troponin    Collection Time: 02/11/23 11:20 AM   Result Value Ref Range    Troponin T 0.064 (H) <0.01 NG/ML   Troponin    Collection Time: 02/11/23 10:59 PM   Result Value Ref Range    Troponin T 0.062 (H) <0.01 NG/ML   C-Reactive Protein    Collection Time: 02/12/23  4:22 AM   Result Value Ref Range    CRP High Sensitivity 19.9 (H) <5.0 mg/L   Basic Metabolic Panel    Collection Time: 02/12/23  4:22 AM   Result Value Ref Range    Sodium 137 135 - 145 MMOL/L    Potassium 4.0 3.5 - 5.1 MMOL/L    Chloride 102 99 - 110 mMol/L    CO2 25 21 - 32 MMOL/L    Anion Gap 10 4 - 16    BUN 36 (H) 6 - 23 MG/DL    Creatinine 2.2 (H) 0.9 - 1.3 MG/DL    Est, Glom Filt Rate 30 (L) >60 mL/min/1.73m2    Glucose 90 70 - 99 MG/DL    Calcium 9.0 8.3 - 10.6 MG/DL   Vancomycin Level, Random    Collection Time: 02/12/23  4:22 AM   Result Value Ref Range    Vancomycin Rm 21.7 UG/ML    DOSE AMOUNT DOSE AMT. GIVEN - UNKNOWN     DOSE TIME DOSE TIME GIVEN - UNKNOWN         Imaging/Diagnostics Last 24 Hours   CT ABDOMEN PELVIS WO CONTRAST Additional Contrast? None    Result Date: 2/10/2023  EXAMINATION: CT OF THE ABDOMEN AND PELVIS WITHOUT CONTRAST 2/10/2023 8:52 am TECHNIQUE: CT of the abdomen and pelvis was performed without the administration of intravenous contrast. Multiplanar reformatted images are provided for review.  Automated exposure control, iterative reconstruction, and/or weight based adjustment of the mA/kV was utilized to reduce the radiation dose to as low as reasonably achievable. COMPARISON: Chest abdomen pelvis CT 01/04/2023, abdomen pelvis CT 05/05/2022 HISTORY: ORDERING SYSTEM PROVIDED HISTORY: ams/recent paracentesis TECHNOLOGIST PROVIDED HISTORY: Reason for exam:->ams/recent paracentesis Additional Contrast?->None Decision Support Exception - unselect if not a suspected or confirmed emergency medical condition->Emergency Medical Condition (MA) Reason for Exam: ams/recent paracentesis FINDINGS: Lower Chest: Incomplete visualization moderate-large right pleural effusion, masslike areas of pulmonary consolidation anterior right mid lung field and posterior right basilar region, diffuse bronchovascular marking prominence throughout visualized lung fields bilaterally. Correlation with history of right lung malignancy, congestive heart failure, bilateral bronchitis/pneumonitis. Decompressed small hiatal hernia also noted. Organs: Lobulated contour and volume loss of the liver consistent with cirrhosis. Otherwise, no acute noncontrast abnormality of the abdominal organs. Hypodense left renal mass unchanged compared to prior studies most consistent with cyst measuring 3-3.5 cm. GI/Bowel: Circumferential mucosal thickening of stomach new/worse compared to 01/04/2023 partially reflecting incomplete distention however, diffuse gastritis, portal gastropathy may produce this appearance. Endoscopic evaluation would be helpful for further evaluation if clinically indicated. Otherwise, no acute abnormality of the GI tract. Pelvis: No acute noncontrast abnormality. Peritoneum/Retroperitoneum: Moderate volume ascites, lymphadenopathy throughout the mesentery similar to prior study with maximal short axis dimension of 12 mm unchanged compared to prior study. In the absence of known malignant disease, findings most consistent with reactive lymph node enlargement associated with ascites, hepatic cirrhosis.   Mild atherosclerotic calcification throughout aortoiliac distribution. No aortic aneurysm. Small left inguinal hernia noted of questionable acute clinical significance. Bones/Soft Tissues: No acute abnormality. Degenerative disc disease lumbar spine with exiting nerve effacement/compression L5-S1 worse on the right. Bilateral gynecomastia noted. 1.4 cm nodule medial aspect of the right breast/anterior chest wall adjacent to deep surface of the skin which may reflect inflammatory process unchanged compared to 05/05/2022. Diffuse hypodensity right abdominal wall musculature and mild infiltrative change of the subcutaneous fat of the right abdomen. Differential considerations include diffuse cellulitis, contusion, edema and changes associated with history of multiple paracenteses. Correlation with clinical findings suggested. Incomplete visualization moderate-large right pleural effusion, moderate-large volume ascites and findings consistent with underlying hepatic cirrhosis. Incomplete visualization 2 foci masslike pulmonary consolidation right lung measuring 4 cm in greatest dimension consistent with malignancy or round atelectasis. Incomplete visualization mediastinal lymphadenopathy similar to prior studies dating back to 05/05/2022 with maximal short axis dimension 1.3 cm. Diffuse mucosal thickening throughout the stomach which may reflect incomplete distention, portal gastropathy diffuse gastritis. Finding is new/worse compared to 01/04/2023. Endoscopic evaluation would be helpful for further evaluation if clinically indicated. Diffuse mesenteric lymphadenopathy similar to prior studies dating back to 05/05/2022 with maximal short axis dimension of 12 mm. Diffuse infiltrative changes subcutaneous soft tissues right abdominal wall, diffuse fatty infiltrative change right abdominal wall musculature. No subcutaneous gas density or drainable fluid collection.   Findings are consistent with contusion, edema, cellulitis, history of multiple paracenteses. Correlation with clinical findings suggested. Gynecomastia and stable subcutaneous 1.4 cm nodule medial aspect of the right breast compared to prior studies dating back to 05/05/2022. CT HEAD WO CONTRAST    Result Date: 2/10/2023  EXAMINATION: CT OF THE HEAD WITHOUT CONTRAST  2/10/2023 8:52 am TECHNIQUE: CT of the head was performed without the administration of intravenous contrast. Automated exposure control, iterative reconstruction, and/or weight based adjustment of the mA/kV was utilized to reduce the radiation dose to as low as reasonably achievable. COMPARISON: 01/28/2023 HISTORY: ORDERING SYSTEM PROVIDED HISTORY: HEAD TRAUMA, CLOSED, MILD, GCS >= 13, NO RISK FACTORS, NEURO EXAM NORMAL TECHNOLOGIST PROVIDED HISTORY: Has a \"code stroke\" or \"stroke alert\" been called? ->No Reason for exam:->ams Decision Support Exception - unselect if not a suspected or confirmed emergency medical condition->Emergency Medical Condition (MA) Reason for Exam:  Head trauma, closed, mild, GCS FINDINGS: BRAIN/VENTRICLES: There is no acute intracranial hemorrhage, mass effect or midline shift. No abnormal extra-axial fluid collection. The gray-white differentiation is maintained without evidence of an acute infarct. There is no evidence of hydrocephalus. Cortical atrophy, white matter changes consistent with microvascular degenerative disease, atherosclerotic calcification distal internal carotid and vertebral arteries of uncertain hemodynamic significance. ORBITS: The visualized portion of the orbits demonstrate no acute abnormality. SINUSES: The visualized paranasal sinuses and mastoid air cells demonstrate no acute abnormality. SOFT TISSUES/SKULL:  No acute abnormality of the visualized skull or soft tissues. Dental tamera right upper incisor suggested.      No noncontrast CT evidence of acute intracranial pathology or acute posttraumatic change Cortical atrophy, white matter changes consistent with microvascular degenerative disease, atherosclerotic calcification distal internal carotid and vertebral arteries of uncertain hemodynamic significance, dental tamera right upper incisor. XR CHEST PORTABLE    Result Date: 2/10/2023  EXAMINATION: ONE XRAY VIEW OF THE CHEST 2/10/2023 8:51 am COMPARISON: 01/30/2023, 01/23/2023 HISTORY: ORDERING SYSTEM PROVIDED HISTORY: AMS TECHNOLOGIST PROVIDED HISTORY: Reason for exam:->AMS Reason for Exam: AMS FINDINGS: Right PICC with tip at the right atrium. Areas of consolidation in the right upper lung remain. Small right basilar pleural effusion remains. The left lung is clear. Heart size is normal.     Right PICC with tip in the region of the right atrium. Right upper lung consolidation consolidation remains similar to 01/23/2023. Small right pleural effusion. IR US GUIDED PARACENTESIS    Result Date: 2/10/2023  PROCEDURE: PARACENTESIS WITHOUT IMAGE GUIDANCE US ABDOMEN LIMITED 2/10/2023 HISTORY: ORDERING SYSTEM PROVIDED HISTORY: ascites TECHNOLOGIST PROVIDED HISTORY: Reason for exam:->ascites TECHNIQUE: Informed consent was obtained after a detailed explanation of the procedure including risks, benefits, and alternatives. Universal protocol was followed. A limited ultrasound of the abdomen was performed. The right abdomen was prepped and draped in sterile fashion and local anesthesia was achieved with lidocaine. A 6 Swiss Safe-T-Centesis catheter  was advanced into ascites and paracentesis was performed. Access was lost during the paracentesis and a 5 Swiss one-step sheathed needle was advanced back into the ascites and paracentesis was resumed. The patient tolerated the procedure well. FINDINGS: Limited ultrasound of the abdomen demonstrates ascites. 2 sonographic images were obtained. A total of 3125 cc of cloudy pink fluid was removed. 525 cc of the fluid was sent to the lab for analysis. Successful paracentesis.      XR ABDOMEN FOR NG/OG/NE TUBE PLACEMENT    Result Date: 2/10/2023  EXAMINATION: ONE SUPINE XRAY VIEW(S) OF THE ABDOMEN 2/10/2023 6:58 pm COMPARISON: None. HISTORY: ORDERING SYSTEM PROVIDED HISTORY: Confirmation of course of NG/OG/NE tube and location of tip of tube TECHNOLOGIST PROVIDED HISTORY: Reason for exam:->Confirmation of course of NG/OG/NE tube and location of tip of tube Portable? ->Yes Reason for Exam: Confirmation of course of NG/OG/NE tube and location of tip of tube Additional signs and symptoms: ams FINDINGS: The tip of the NG/OG tube and side hole/port are in good position in the mid stomach. There is a central venous catheter with the tip in the right atrium. The OG/NG tube has been placed in good position. XR ABDOMEN FOR NG/OG/NE TUBE PLACEMENT    Result Date: 2/10/2023  EXAMINATION: ONE SUPINE XRAY VIEW(S) OF THE ABDOMEN 2/10/2023 11:46 am COMPARISON: 05/08/2022 HISTORY: ORDERING SYSTEM PROVIDED HISTORY: ng tube TECHNOLOGIST PROVIDED HISTORY: Abd KUB Reason for exam:->ng tube Portable? ->Yes Reason for Exam: ng tube FINDINGS: Nasogastric tube tip within the stomach with side port at the level of the gastroesophageal junction. Advanced nasogastric tube by approximately 10 cm suggested to ensure adequate gastric decompression. Visualized abdomen demonstrates nonspecific partial distention of small bowel to maximal diameter of 2.6 cm. Visualized GI tract demonstrates no bowel wall edema or mucosal thickening. No free intraperitoneal air or significant ascites in the visualized abdomen. Nasogastric tube tip in decompressed stomach with side port at the level of the gastroesophageal junction. Advanced nasogastric tube by approximately 10 cm suggested to ensure adequate gastric decompression.        Electronically signed by Kasey Rodas MD on 2/12/2023 at 11:07 AM

## 2023-02-13 ENCOUNTER — CARE COORDINATION (OUTPATIENT)
Dept: CASE MANAGEMENT | Age: 78
End: 2023-02-13

## 2023-02-13 PROBLEM — K76.82: Status: ACTIVE | Noted: 2023-02-13

## 2023-02-13 LAB
ANION GAP SERPL CALCULATED.3IONS-SCNC: 8 MMOL/L (ref 4–16)
BUN SERPL-MCNC: 34 MG/DL (ref 6–23)
CALCIUM SERPL-MCNC: 9.1 MG/DL (ref 8.3–10.6)
CHLORIDE BLD-SCNC: 102 MMOL/L (ref 99–110)
CO2: 27 MMOL/L (ref 21–32)
CREAT SERPL-MCNC: 2.1 MG/DL (ref 0.9–1.3)
CRP SERPL HS-MCNC: 16.1 MG/L
CULTURE: NORMAL
GFR SERPL CREATININE-BSD FRML MDRD: 32 ML/MIN/1.73M2
GLUCOSE SERPL-MCNC: 111 MG/DL (ref 70–99)
Lab: NORMAL
POTASSIUM SERPL-SCNC: 4.3 MMOL/L (ref 3.5–5.1)
SODIUM BLD-SCNC: 137 MMOL/L (ref 135–145)
SPECIMEN: NORMAL

## 2023-02-13 PROCEDURE — 2580000003 HC RX 258: Performed by: NURSE PRACTITIONER

## 2023-02-13 PROCEDURE — 6360000002 HC RX W HCPCS: Performed by: NURSE PRACTITIONER

## 2023-02-13 PROCEDURE — 99232 SBSQ HOSP IP/OBS MODERATE 35: CPT | Performed by: NURSE PRACTITIONER

## 2023-02-13 PROCEDURE — 6370000000 HC RX 637 (ALT 250 FOR IP): Performed by: STUDENT IN AN ORGANIZED HEALTH CARE EDUCATION/TRAINING PROGRAM

## 2023-02-13 PROCEDURE — 2580000003 HC RX 258: Performed by: INTERNAL MEDICINE

## 2023-02-13 PROCEDURE — 36415 COLL VENOUS BLD VENIPUNCTURE: CPT

## 2023-02-13 PROCEDURE — 99231 SBSQ HOSP IP/OBS SF/LOW 25: CPT | Performed by: SPECIALIST

## 2023-02-13 PROCEDURE — 6370000000 HC RX 637 (ALT 250 FOR IP): Performed by: NURSE PRACTITIONER

## 2023-02-13 PROCEDURE — 80048 BASIC METABOLIC PNL TOTAL CA: CPT

## 2023-02-13 PROCEDURE — 86140 C-REACTIVE PROTEIN: CPT

## 2023-02-13 PROCEDURE — 1200000000 HC SEMI PRIVATE

## 2023-02-13 RX ADMIN — SODIUM CHLORIDE, PRESERVATIVE FREE 10 ML: 5 INJECTION INTRAVENOUS at 20:29

## 2023-02-13 RX ADMIN — PANTOPRAZOLE SODIUM 40 MG: 40 TABLET, DELAYED RELEASE ORAL at 06:13

## 2023-02-13 RX ADMIN — DAPTOMYCIN 650 MG: 500 INJECTION, POWDER, LYOPHILIZED, FOR SOLUTION INTRAVENOUS at 16:56

## 2023-02-13 RX ADMIN — LACTULOSE 20 G: 10 SOLUTION ORAL at 13:59

## 2023-02-13 RX ADMIN — ENOXAPARIN SODIUM 40 MG: 100 INJECTION SUBCUTANEOUS at 08:23

## 2023-02-13 RX ADMIN — RIFAXIMIN 550 MG: 550 TABLET ORAL at 08:23

## 2023-02-13 RX ADMIN — LACTULOSE 20 G: 10 SOLUTION ORAL at 08:23

## 2023-02-13 RX ADMIN — LEVOTHYROXINE SODIUM 50 MCG: 0.05 TABLET ORAL at 06:13

## 2023-02-13 RX ADMIN — SODIUM CHLORIDE, PRESERVATIVE FREE 10 ML: 5 INJECTION INTRAVENOUS at 08:24

## 2023-02-13 RX ADMIN — SODIUM CHLORIDE, PRESERVATIVE FREE 10 ML: 5 INJECTION INTRAVENOUS at 20:30

## 2023-02-13 RX ADMIN — RIFAXIMIN 550 MG: 550 TABLET ORAL at 20:28

## 2023-02-13 RX ADMIN — LACTULOSE 20 G: 10 SOLUTION ORAL at 20:28

## 2023-02-13 ASSESSMENT — PAIN SCALES - WONG BAKER
WONGBAKER_NUMERICALRESPONSE: 0

## 2023-02-13 ASSESSMENT — PAIN SCALES - GENERAL: PAINLEVEL_OUTOF10: 0

## 2023-02-13 NOTE — PLAN OF CARE
Problem: Safety - Violent/Self-destructive Restraint  Goal: Remains free of injury from restraints (Restraint for Violent/Self-Destructive Behavior)  Description: INTERVENTIONS:  1. Determine that de-escalation and other, less restrictive measures have been tried or would not be effective before applying the restraint  2. Identify and document the criteria for restraint  3. Evaluate the patient's condition at the time of restraint application  4. Inform patient/family regarding the reason for restraint/seclusion  5. Q2H: Monitor comfort, nutrition and hydration needs  6. Q15M: Perform safety checks including skin, circulation, sensory, respiratory and psychological status  7. Ensure continuous observation  8. Identify and implement measures to help patient regain control, assess readiness for release and initiate progressive release per policy  [id number] by Taniya Torres RN  Outcome: Progressing  2/13/2023 0058 by Latesha Segovia RN  Outcome: Progressing     Problem: Discharge Planning  Goal: Discharge to home or other facility with appropriate resources  2/13/2023 0814 by Taniya Torres RN  Outcome: Progressing  Flowsheets (Taken 2/13/2023 0808)  Discharge to home or other facility with appropriate resources:   Identify barriers to discharge with patient and caregiver   Arrange for needed discharge resources and transportation as appropriate   Identify discharge learning needs (meds, wound care, etc)   Refer to discharge planning if patient needs post-hospital services based on physician order or complex needs related to functional status, cognitive ability or social support system  2/13/2023 0058 by Latesha Segovia RN  Outcome: Progressing     Problem: Safety - Medical Restraint  Goal: Remains free of injury from restraints (Restraint for Interference with Medical Device)  Description: INTERVENTIONS:  1.  Determine that other, less restrictive measures have been tried or would not be effective before applying the restraint  2. Evaluate the patient's condition at the time of restraint application  3. Inform patient/family regarding the reason for restraint  4.  Q2H: Monitor safety, psychosocial status, comfort, nutrition and hydration  2/13/2023 0814 by Scheryl Seip, RN  Outcome: Progressing  2/13/2023 0058 by Jimenez Walter RN  Outcome: Progressing     Problem: Safety - Adult  Goal: Free from fall injury  2/13/2023 0814 by Scheryl Seip, RN  Outcome: Progressing  Flowsheets (Taken 2/13/2023 0813)  Free From Fall Injury: Instruct family/caregiver on patient safety  2/13/2023 0058 by Jimenez Walter RN  Outcome: Progressing     Problem: Pain  Goal: Verbalizes/displays adequate comfort level or baseline comfort level  2/13/2023 0814 by Scheryl Seip, RN  Outcome: Progressing  2/13/2023 0058 by Jimenez Walter RN  Outcome: Progressing     Problem: ABCDS Injury Assessment  Goal: Absence of physical injury  Outcome: Progressing

## 2023-02-13 NOTE — CARE COORDINATION
[facility] Care Transitions Follow Up Call      Patient: Gabriela Aase  Patient : 1945   MRN: [id number]  Reason for Admission:   Discharge Date: 2/3/23 RARS: Readmission Risk Score: 39    Patient readmitted on 2/10/23 for AMS. Episode resolved per protocol. CT staff to follow if criteria met upon discharge.       Suellen Mederos RN

## 2023-02-13 NOTE — PROGRESS NOTES
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 Incomplete visualization 2 foci masslike pulmonary consolidation right lung measuring 4 cm in greatest dimension consistent with malignancy or round atelectasis. Incomplete visualization mediastinal lymphadenopathy similar to   prior studies dating back to 2022 with maximal short axis dimension 1.3   cm. Diffuse mucosal thickening throughout the stomach which may reflect   incomplete distention, portal gastropathy diffuse gastritis. Finding is new/worse compared to 2023. Endoscopic evaluation would be helpful for further evaluation if clinically indicated. Diffuse mesenteric lymphadenopathy similar to prior studies dating back to   2022 with maximal short axis dimension of 12 mm. Diffuse infiltrative changes subcutaneous soft tissues right abdominal wall,   diffuse fatty infiltrative change right abdominal wall musculature. No   subcutaneous gas density or drainable fluid collection. Findings are   consistent with contusion, edema, cellulitis, history of multiple paracenteses. Correlation with clinical findings suggested.    Gynecomastia and stable subcutaneous 1.4 cm nodule medial aspect of the right   breast compared to prior studies dating back to 2022   2/10-S/p IR paracentesis:  Cultures: NGTD  De-Compensated Alcoholic Cirrhosis with Ascites and Acute Hepatic Encephalopathy:  Dr. Saad Melara onboard  Imp of no SBP   Rec continue Xifaxan and Nadolol   CANDIE -Possibly due to vancomycin: on CKD3:  Dr. Danna Lara onboard  Currently Cr 2.1 with baseline of 1.3  Rec to use another ABX besides vancomycin    JENNIFER:  Pulmonary Nodules:  HTN/ HFpEF/ Elevated Troponin:  Dr. Marcela Fonseca onboard  Imp of elevated trop probably secondary to CKD2  Hearing Impaired:  Obesity:  BMI: 33.15  Multi-morbidity: per PMHx     Plan:  DC IV vancomycin as may be inducing CANDIE  Start IV daptomycin 8 mg/kg q24h with end date 3/13/2023 (as CrCl is 34, above 30, if decreases below 30 will change to q48h)  Check CK q3d while on daptomcin  Trend CRP and Pct, order  When ready for DC: Follow up in ID clinic in 1 week please  Weekly labs drawn on Monday during the course of treatment  CBC with differential, CMP, ESR, CRP, CK  Fax results to Attn: Shilpi Hernandez Infectious Diseases Staff  # [phone]   OK from ID standpoint to DC when ready    Ongoing Antimicrobial Therapy  Daptomycin 2/13-  Completed Antimicrobial Therapy  Vancomycin 1/30-6 week duration with planned end date 3/13/2023? Vancomycin 2/10-13? History:? Interval history noted. 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 Vancomycin toxicity. Denies n/v/d/f or untoward effects of antibiotics  Physical Exam:  Vital Signs: BP (!) 144/72   Pulse 75   Temp 98 °F (36.7 °C) (Oral)   Resp 15   Wt 215 lb (97.5 kg)   SpO2 98%   BMI 32.69 kg/m²     Gen: A&O x 4  Chest: no distress and CTA. Good air movement anterior breath sounds. Oxygen per NC  Heart: NSR and no MRG. Abd: soft, non-distended, no tenderness, no hepatomegaly. Normoactive bowel sounds. NGT:  Intact left nares to LIS. Ext: no clubbing, cyanosis, or edema  Neuro: Mental status intact. CN 2-12 intact and no focal sensory or motor deficits     Radiologic / Imaging / TESTING  2/10/2023 XR Chest Portable:  Impression   Right PICC with tip in the region of the right atrium.        Right upper lung consolidation consolidation remains similar to 01/23/2023. Small right pleural effusion. 2/10/2023 CT Head WO Contrast:  Impression   No noncontrast CT evidence of acute intracranial pathology or acute   posttraumatic change       Cortical atrophy, white matter changes consistent with microvascular   degenerative disease, atherosclerotic calcification distal internal carotid   and vertebral arteries of uncertain hemodynamic significance, dental tamera   right upper incisor. 2/10/2023 CT Abdomen Pelvis WO Contrast:  Impression   Incomplete visualization moderate-large right pleural effusion,   moderate-large volume ascites and findings consistent with underlying hepatic   cirrhosis. Incomplete visualization 2 foci masslike pulmonary consolidation right lung   measuring 4 cm in greatest dimension consistent with malignancy or round   atelectasis. Incomplete visualization mediastinal lymphadenopathy similar to   prior studies dating back to 05/05/2022 with maximal short axis dimension 1.3   cm. Diffuse mucosal thickening throughout the stomach which may reflect   incomplete distention, portal gastropathy diffuse gastritis. Finding is   new/worse compared to 01/04/2023. Endoscopic evaluation would be helpful for   further evaluation if clinically indicated. Diffuse mesenteric lymphadenopathy similar to prior studies dating back to   05/05/2022 with maximal short axis dimension of 12 mm. Diffuse infiltrative changes subcutaneous soft tissues right abdominal wall,   diffuse fatty infiltrative change right abdominal wall musculature. No   subcutaneous gas density or drainable fluid collection. Findings are   consistent with contusion, edema, cellulitis, history of multiple   paracenteses. Correlation with clinical findings suggested.        Gynecomastia and stable subcutaneous 1.4 cm nodule medial aspect of the right   breast compared to prior studies dating back to 05/05/2022      2/10/2023 XR Abdomen for NG Placement; Impression   Nasogastric tube tip in decompressed stomach with side port at the level of   the gastroesophageal junction.   Advanced nasogastric tube by approximately 10   cm suggested to ensure adequate gastric decompression        Labs:    Recent Results (from the past 24 hour(s))   Procalcitonin    Collection Time: 02/12/23  5:20 PM   Result Value Ref Range    Procalcitonin 0.138    C-Reactive Protein    Collection Time: 02/13/23  5:17 AM   Result Value Ref Range    CRP High Sensitivity 16.1 (H) <5.0 mg/L   Basic Metabolic Panel    Collection Time: 02/13/23  5:17 AM   Result Value Ref Range    Sodium 137 135 - 145 MMOL/L    Potassium 4.3 3.5 - 5.1 MMOL/L    Chloride 102 99 - 110 mMol/L    CO2 27 21 - 32 MMOL/L    Anion Gap 8 4 - 16    BUN 34 (H) 6 - 23 MG/DL    Creatinine 2.1 (H) 0.9 - 1.3 MG/DL    Est, Glom Filt Rate 32 (L) >60 mL/min/1.73m2    Glucose 111 (H) 70 - 99 MG/DL    Calcium 9.1 8.3 - 10.6 MG/DL     CULTURE results: Invalid input(s): BLOOD CULTURE,  URINE CULTURE, SURGICAL CULTURE    Diagnosis:  Patient Active Problem List   Diagnosis    Shortness of breath    History of colonic polyps    Ascites due to alcoholic cirrhosis (HCC)    Mixed hyperlipidemia    Hypertension    History of alcohol abuse    Gastroesophageal reflux disease    Anxiety    Pulmonary nodules    Hyperglycemia    Acquired hypothyroidism    Class 3 severe obesity with body mass index (BMI) of 40.0 to 44.9 in adult [facility])    Bilateral hearing loss    Increased ammonia level    Portal hypertension (HCC)    SOB (shortness of breath)    Decompensation of cirrhosis of liver (HCC)    Secondary esophageal varices without bleeding (HCC)    Gastric polyps    Hearing loss    Septicemia (HCC)    Acute metabolic encephalopathy    Recurrent right pleural effusion    Acute respiratory failure (HCC)    Acute kidney injury superimposed on chronic kidney disease (HCC)    Generalized weakness    Oropharyngeal dysphagia    Cellulitis of left lower extremity    Type 2 diabetes mellitus without complication, without long-term current use of insulin (HCC)    Thyroid disease    Type 2 diabetes mellitus with chronic kidney disease    Ex-smoker    Obesity (BMI 30-39. 9)    Recurrent pleural effusion on right    Pleural effusion    Hypoxia    AMS (altered mental status)    Sleep related hypoxia    Stage 3 chronic kidney disease (HCC)    Bacteremia due to methicillin resistant Staphylococcus epidermidis    Alcoholic cirrhosis of liver with ascites (HCC)    Coagulase negative Staphylococcus bacteremia    Chronic heart failure, unspecified heart failure type (HCC)    Portal systemic encephalopathy       Active Problems  Principal Problem:    AMS (altered mental status)  Active Problems:    Portal systemic encephalopathy  Resolved Problems:    * No resolved hospital problems. *    Electronically signed by: Electronically signed by Leslie Slater.  MOHINDER Hall CNP on 2/13/2023 at 12:34 PM

## 2023-02-13 NOTE — PROGRESS NOTES
02/13/23 1634   Encounter Summary   Encounter Overview/Reason  Initial Encounter   Service Provided For: Patient; Family  (patient was deeply sleeping)   Referral/Consult From: Clergy/   Support System Spouse; Children   Last Encounter  02/13/23   Complexity of Encounter Low   Begin Time 1630   End Time  1644   Total Time Calculated 14 min   Encounter    Type Initial Screen/Assessment   Spiritual/Emotional needs   Type Spiritual Support   Grief, Loss, and Adjustments   Type Adjustment to illness; Life Adjustments   Assessment/Intervention/Outcome   Assessment Coping   Intervention Active listening;Empowerment   Outcome Coping   Plan and Referrals   Plan/Referrals Continue Support (comment); Continue to visit, (comment)

## 2023-02-13 NOTE — PROGRESS NOTES
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  2) stage 1-2 PSE - probably precipitated by CANDIE-slowly improving  3) history of staph epi endocarditis  4) CANDIE  5) refractory ascites with hepatic hydrothorax (chylous)-- reasonably managed recently with diuretics, paracentesis every 2 weeks and thoracentesis prn--difficult problem- not a candidate for TIPS due to PSE and MELD likely too low for transplant, although have recommended repeat evaluation at [address]  6) thickened stomach on CT- asymptomatic, likely due to portal hypertensive gastropathy and erosive gastritis seen at EGD 1 year ago-repeat endoscopy not planned at this point        RECOMMENDATIONS:  1) continue lactulose and adjust dose to produce 2-4 BM/d  2) continue Xifaxan, Nadolol  3) diuretic dosage per nephrology

## 2023-02-13 NOTE — PROGRESS NOTES
V2.0  [facility] [place]      Name:  Alysa Desai /Age/Sex: 1945  (78 y.o. male)   MRN & CSN:  [id number] & [id number] Encounter Date/Time: 2023 3:06 PM EST    Location:  [address] [place], [place] [address] PCP: Helen Cunningham, [address] Day: 4    Assessment and Plan:   Alysa Desai is a 78 y.o. male with pmh of  who presents with AMS (altered mental status)    # Acute metabolic encephalopathy likely due to hepatic encephalopathy    CT of the head nonacute.   -Ammonia on presentation 125, improving  Currently on lactulose and rifaximin    #Decompensated alcohol liver cirrhosis  -Underwent paracentesis on 2/10/2023. Fluid analysis not suggestive for SBP.  -Evaluated by GI.  -Continue lactulose and rifaximin      #Tricuspid valve endocarditis  -As noted on EVA done on 2023.  -Patient had staph epi bacteremia. Positive blood cultures on 2023. Was getting IV antibiotics outpatient. Plan to continue IV vancomycin 1 g every 24 hour till 3/13/2023. #Staph epi bacteremia  -Continue IV vancomycin  -Was seen by ID last admission. Has been consulted       # Acute kidney injury on CKD stage III   -Dehydration versus hepatorenal versus vancomycin use  -Creatinine on presentation 2.2 mg/DL. Today baseline creatinine appears to be around 1.2 1.3 mg/DL. -Nephrology on board. Holding diuretics. # Elevated trop  -Likely demand ischemia and CKD  -Evaluated by cardiology. Other chronic medical conditions-resume home medications as contraindicated  # JENNIFER/Chronic respiratory failure on O2 2l per nc-on recent admission patient was managed on BiPAP nightly and as needed, per pts wife pt is not on home BIPAP, continue O2 @ 2l per nc, monitor pulse ox  # Essential hypertension -currently on naldalol and diuretics, BP stable  # Acquired hypothyroidism-resume levothyroxine, f/u tsh  # History right hydrothorax status post right thoracentesis on recent hospitalization.   Monitor need for repeat thoracentesis. Consider pulm consultation  # GERD on ppi  #Burning urination. UA unremarkable. Follow urine culture    Diet ADULT DIET; Regular; 3 carb choices (45 gm/meal); Low Fat/Low Chol/High Fiber/CARLOS; Low Sodium (2 gm)   DVT Prophylaxis [] Lovenox, []  Heparin, [] SCDs, [] Ambulation,  [] Eliquis, [] Xarelto  [] Coumadin   Code Status Full Code   Disposition From: Home  Expected Disposition: Home  Estimated Date of Discharge: 1 to 2 days  Patient requires continued admission due to CANDIE   Surrogate Decision Maker/ POA      Subjective:     Chief Complaint: Altered Mental Status       Geraldo Irene is a 78 y.o. male who presents with encephalopathy. He is hard of hearing. States that he had a bowel movement last night. Would like to go home denies any other active complaints      Review of Systems:    Review of Systems    As above     Objective: Intake/Output Summary (Last 24 hours) at 2/13/2023 1449  Last data filed at 2/13/2023 0824  Gross per 24 hour   Intake 5 ml   Output --   Net 5 ml          Vitals:   Vitals:    02/13/23 1439   BP: 115/65   Pulse: 75   Resp: 21   Temp: 98 °F (36.7 °C)   SpO2: 98%       Physical Exam:     General: NAD  Eyes: EOMI  ENT: neck supple  Cardiovascular: Regular rate. Respiratory: Clear to auscultation  Gastrointestinal: Soft, non tender  Genitourinary: no suprapubic tenderness  Musculoskeletal: No edema  Skin: warm, dry  Neuro: Alert. Psych: Mood appropriate.      Medications:   Medications:    pantoprazole  40 mg Oral QAM AC    lidocaine PF  5 mL IntraDERmal Once    sodium chloride flush  5-40 mL IntraVENous 2 times per day    sodium chloride flush  5-40 mL IntraVENous 2 times per day    enoxaparin  40 mg SubCUTAneous Daily    lactulose  20 g Oral TID    levothyroxine  50 mcg Oral Daily    nadolol  40 mg Oral Nightly    [Held by provider] furosemide  40 mg Oral Daily    [Held by provider] spironolactone  100 mg Oral BID    rifAXIMin  550 mg Oral BID vancomycin (VANCOCIN) intermittent dosing (placeholder)   Other RX Placeholder      Infusions:    sodium chloride      sodium chloride       PRN Meds: sodium chloride flush, 5-40 mL, PRN  sodium chloride, , PRN  sodium chloride flush, 5-40 mL, PRN  sodium chloride, , PRN  ondansetron, 4 mg, Q8H PRN   Or  ondansetron, 4 mg, Q6H PRN  polyethylene glycol, 17 g, Daily PRN  acetaminophen, 650 mg, Q6H PRN   Or  acetaminophen, 650 mg, Q6H PRN      Labs      Recent Results (from the past 24 hour(s))   Procalcitonin    Collection Time: 02/12/23  5:20 PM   Result Value Ref Range    Procalcitonin 0.138    C-Reactive Protein    Collection Time: 02/13/23  5:17 AM   Result Value Ref Range    CRP High Sensitivity 16.1 (H) <5.0 mg/L   Basic Metabolic Panel    Collection Time: 02/13/23  5:17 AM   Result Value Ref Range    Sodium 137 135 - 145 MMOL/L    Potassium 4.3 3.5 - 5.1 MMOL/L    Chloride 102 99 - 110 mMol/L    CO2 27 21 - 32 MMOL/L    Anion Gap 8 4 - 16    BUN 34 (H) 6 - 23 MG/DL    Creatinine 2.1 (H) 0.9 - 1.3 MG/DL    Est, Glom Filt Rate 32 (L) >60 mL/min/1.73m2    Glucose 111 (H) 70 - 99 MG/DL    Calcium 9.1 8.3 - 10.6 MG/DL        Imaging/Diagnostics Last 24 Hours   CT ABDOMEN PELVIS WO CONTRAST Additional Contrast? None    Result Date: 2/10/2023  EXAMINATION: CT OF THE ABDOMEN AND PELVIS WITHOUT CONTRAST 2/10/2023 8:52 am TECHNIQUE: CT of the abdomen and pelvis was performed without the administration of intravenous contrast. Multiplanar reformatted images are provided for review. Automated exposure control, iterative reconstruction, and/or weight based adjustment of the mA/kV was utilized to reduce the radiation dose to as low as reasonably achievable.  COMPARISON: Chest abdomen pelvis CT 01/04/2023, abdomen pelvis CT 05/05/2022 HISTORY: ORDERING SYSTEM PROVIDED HISTORY: ams/recent paracentesis TECHNOLOGIST PROVIDED HISTORY: Reason for exam:->ams/recent paracentesis Additional Contrast?->None Decision Support Exception - unselect if not a suspected or confirmed emergency medical condition->Emergency Medical Condition (MA) Reason for Exam: ams/recent paracentesis FINDINGS: Lower Chest: Incomplete visualization moderate-large right pleural effusion, masslike areas of pulmonary consolidation anterior right mid lung field and posterior right basilar region, diffuse bronchovascular marking prominence throughout visualized lung fields bilaterally. Correlation with history of right lung malignancy, congestive heart failure, bilateral bronchitis/pneumonitis. Decompressed small hiatal hernia also noted. Organs: Lobulated contour and volume loss of the liver consistent with cirrhosis. Otherwise, no acute noncontrast abnormality of the abdominal organs. Hypodense left renal mass unchanged compared to prior studies most consistent with cyst measuring 3-3.5 cm. GI/Bowel: Circumferential mucosal thickening of stomach new/worse compared to 01/04/2023 partially reflecting incomplete distention however, diffuse gastritis, portal gastropathy may produce this appearance. Endoscopic evaluation would be helpful for further evaluation if clinically indicated. Otherwise, no acute abnormality of the GI tract. Pelvis: No acute noncontrast abnormality. Peritoneum/Retroperitoneum: Moderate volume ascites, lymphadenopathy throughout the mesentery similar to prior study with maximal short axis dimension of 12 mm unchanged compared to prior study. In the absence of known malignant disease, findings most consistent with reactive lymph node enlargement associated with ascites, hepatic cirrhosis. Mild atherosclerotic calcification throughout aortoiliac distribution. No aortic aneurysm. Small left inguinal hernia noted of questionable acute clinical significance. Bones/Soft Tissues: No acute abnormality. Degenerative disc disease lumbar spine with exiting nerve effacement/compression L5-S1 worse on the right. Bilateral gynecomastia noted.   1.4 cm nodule medial aspect of the right breast/anterior chest wall adjacent to deep surface of the skin which may reflect inflammatory process unchanged compared to 05/05/2022. Diffuse hypodensity right abdominal wall musculature and mild infiltrative change of the subcutaneous fat of the right abdomen. Differential considerations include diffuse cellulitis, contusion, edema and changes associated with history of multiple paracenteses. Correlation with clinical findings suggested. Incomplete visualization moderate-large right pleural effusion, moderate-large volume ascites and findings consistent with underlying hepatic cirrhosis. Incomplete visualization 2 foci masslike pulmonary consolidation right lung measuring 4 cm in greatest dimension consistent with malignancy or round atelectasis. Incomplete visualization mediastinal lymphadenopathy similar to prior studies dating back to 05/05/2022 with maximal short axis dimension 1.3 cm. Diffuse mucosal thickening throughout the stomach which may reflect incomplete distention, portal gastropathy diffuse gastritis. Finding is new/worse compared to 01/04/2023. Endoscopic evaluation would be helpful for further evaluation if clinically indicated. Diffuse mesenteric lymphadenopathy similar to prior studies dating back to 05/05/2022 with maximal short axis dimension of 12 mm. Diffuse infiltrative changes subcutaneous soft tissues right abdominal wall, diffuse fatty infiltrative change right abdominal wall musculature. No subcutaneous gas density or drainable fluid collection. Findings are consistent with contusion, edema, cellulitis, history of multiple paracenteses. Correlation with clinical findings suggested. Gynecomastia and stable subcutaneous 1.4 cm nodule medial aspect of the right breast compared to prior studies dating back to 05/05/2022.      CT HEAD WO CONTRAST    Result Date: 2/10/2023  EXAMINATION: CT OF THE HEAD WITHOUT CONTRAST  2/10/2023 8:52 am TECHNIQUE: CT of the head was performed without the administration of intravenous contrast. Automated exposure control, iterative reconstruction, and/or weight based adjustment of the mA/kV was utilized to reduce the radiation dose to as low as reasonably achievable. COMPARISON: 01/28/2023 HISTORY: ORDERING SYSTEM PROVIDED HISTORY: HEAD TRAUMA, CLOSED, MILD, GCS >= 13, NO RISK FACTORS, NEURO EXAM NORMAL TECHNOLOGIST PROVIDED HISTORY: Has a \"code stroke\" or \"stroke alert\" been called? ->No Reason for exam:->ams Decision Support Exception - unselect if not a suspected or confirmed emergency medical condition->Emergency Medical Condition (MA) Reason for Exam:  Head trauma, closed, mild, GCS FINDINGS: BRAIN/VENTRICLES: There is no acute intracranial hemorrhage, mass effect or midline shift. No abnormal extra-axial fluid collection. The gray-white differentiation is maintained without evidence of an acute infarct. There is no evidence of hydrocephalus. Cortical atrophy, white matter changes consistent with microvascular degenerative disease, atherosclerotic calcification distal internal carotid and vertebral arteries of uncertain hemodynamic significance. ORBITS: The visualized portion of the orbits demonstrate no acute abnormality. SINUSES: The visualized paranasal sinuses and mastoid air cells demonstrate no acute abnormality. SOFT TISSUES/SKULL:  No acute abnormality of the visualized skull or soft tissues. Dental tamera right upper incisor suggested. No noncontrast CT evidence of acute intracranial pathology or acute posttraumatic change Cortical atrophy, white matter changes consistent with microvascular degenerative disease, atherosclerotic calcification distal internal carotid and vertebral arteries of uncertain hemodynamic significance, dental tamera right upper incisor.      XR CHEST PORTABLE    Result Date: 2/10/2023  EXAMINATION: ONE XRAY VIEW OF THE CHEST 2/10/2023 8:51 am COMPARISON: 01/30/2023, 01/23/2023 HISTORY: ORDERING SYSTEM PROVIDED HISTORY: AMS TECHNOLOGIST PROVIDED HISTORY: Reason for exam:->AMS Reason for Exam: AMS FINDINGS: Right PICC with tip at the right atrium. Areas of consolidation in the right upper lung remain. Small right basilar pleural effusion remains. The left lung is clear. Heart size is normal.     Right PICC with tip in the region of the right atrium. Right upper lung consolidation consolidation remains similar to 01/23/2023. Small right pleural effusion. IR US GUIDED PARACENTESIS    Result Date: 2/10/2023  PROCEDURE: PARACENTESIS WITHOUT IMAGE GUIDANCE US ABDOMEN LIMITED 2/10/2023 HISTORY: ORDERING SYSTEM PROVIDED HISTORY: ascites TECHNOLOGIST PROVIDED HISTORY: Reason for exam:->ascites TECHNIQUE: Informed consent was obtained after a detailed explanation of the procedure including risks, benefits, and alternatives. Universal protocol was followed. A limited ultrasound of the abdomen was performed. The right abdomen was prepped and draped in sterile fashion and local anesthesia was achieved with lidocaine. A 6 Dominican Safe-T-Centesis catheter  was advanced into ascites and paracentesis was performed. Access was lost during the paracentesis and a 5 Dominican one-step sheathed needle was advanced back into the ascites and paracentesis was resumed. The patient tolerated the procedure well. FINDINGS: Limited ultrasound of the abdomen demonstrates ascites. 2 sonographic images were obtained. A total of 3125 cc of cloudy pink fluid was removed. 525 cc of the fluid was sent to the lab for analysis. Successful paracentesis. XR ABDOMEN FOR NG/OG/NE TUBE PLACEMENT    Result Date: 2/10/2023  EXAMINATION: ONE SUPINE XRAY VIEW(S) OF THE ABDOMEN 2/10/2023 6:58 pm COMPARISON: None.  HISTORY: ORDERING SYSTEM PROVIDED HISTORY: Confirmation of course of NG/OG/NE tube and location of tip of tube TECHNOLOGIST PROVIDED HISTORY: Reason for exam:->Confirmation of course of NG/OG/NE tube and location of tip of tube Portable? ->Yes Reason for Exam: Confirmation of course of NG/OG/NE tube and location of tip of tube Additional signs and symptoms: ams FINDINGS: The tip of the NG/OG tube and side hole/port are in good position in the mid stomach. There is a central venous catheter with the tip in the right atrium. The OG/NG tube has been placed in good position. XR ABDOMEN FOR NG/OG/NE TUBE PLACEMENT    Result Date: 2/10/2023  EXAMINATION: ONE SUPINE XRAY VIEW(S) OF THE ABDOMEN 2/10/2023 11:46 am COMPARISON: 05/08/2022 HISTORY: ORDERING SYSTEM PROVIDED HISTORY: ng tube TECHNOLOGIST PROVIDED HISTORY: Abd KUB Reason for exam:->ng tube Portable? ->Yes Reason for Exam: ng tube FINDINGS: Nasogastric tube tip within the stomach with side port at the level of the gastroesophageal junction. Advanced nasogastric tube by approximately 10 cm suggested to ensure adequate gastric decompression. Visualized abdomen demonstrates nonspecific partial distention of small bowel to maximal diameter of 2.6 cm. Visualized GI tract demonstrates no bowel wall edema or mucosal thickening. No free intraperitoneal air or significant ascites in the visualized abdomen. Nasogastric tube tip in decompressed stomach with side port at the level of the gastroesophageal junction. Advanced nasogastric tube by approximately 10 cm suggested to ensure adequate gastric decompression.        Electronically signed by Gary Foster MD on 2/13/2023 at 2:49 PM

## 2023-02-13 NOTE — PROGRESS NOTES
Nephrology Progress Note        [address] [facility], [address] [place] [address]  Phone: [phone]  Office Hours: 8:30AM - 4:30PM  Monday - Friday 2/13/2023 6:48 AM  Subjective:   Admit Date: 2/10/2023  PCP: WINSTON ANNA MD  Interval History:   On room air  Alert and awake    Diet: ADULT DIET; Regular; 3 carb choices (45 gm/meal); Low Fat/Low Chol/High Fiber/CARLOS; Low Sodium (2 gm)      Data:   Scheduled Meds:   pantoprazole  40 mg Oral QAM AC    lidocaine PF  5 mL IntraDERmal Once    sodium chloride flush  5-40 mL IntraVENous 2 times per day    sodium chloride flush  5-40 mL IntraVENous 2 times per day    enoxaparin  40 mg SubCUTAneous Daily    lactulose  20 g Oral TID    levothyroxine  50 mcg Oral Daily    nadolol  40 mg Oral Nightly    [Held by provider] furosemide  40 mg Oral Daily    [Held by provider] spironolactone  100 mg Oral BID    rifAXIMin  550 mg Oral BID    vancomycin (VANCOCIN) intermittent dosing (placeholder)   Other RX Placeholder     Continuous Infusions:   sodium chloride      sodium chloride       PRN Meds:sodium chloride flush, sodium chloride, sodium chloride flush, sodium chloride, ondansetron **OR** ondansetron, polyethylene glycol, acetaminophen **OR** acetaminophen  I/O last 3 completed shifts:  In: -   Out: 350 [Urine:350]  No intake/output data recorded.  No intake or output data in the 24 hours ending 02/13/23 0648    CBC:   Recent Labs     02/10/23  0853 02/11/23  0550   WBC 8.2 7.9   HGB 10.8* 11.3*    248       BMP:    Recent Labs     02/11/23  0550 02/12/23  0422 02/13/23  0517    137 137   K 4.4 4.0 4.3    102 102   CO2 25 25 27   BUN 37* 36* 34*   CREATININE 2.0* 2.2* 2.1*   GLUCOSE 93 90 111*     Hepatic:   Recent Labs     02/10/23  0853 02/11/23  0550   AST 31 35   ALT 22 24   BILITOT 0.5 0.7   ALKPHOS 90 90     Troponin: No results for input(s): TROPONINI in the last 72 hours.  BNP: No results for input(s): BNP in the last 72  hours. Lipids: No results for input(s): CHOL, HDL in the last 72 hours.     Invalid input(s): LDLCALCU  ABGs:   Lab Results   Component Value Date/Time    PO2ART 73 06/23/2022 11:00 AM    DTU4FTR 63.0 06/23/2022 11:00 AM     INR:   Recent Labs     02/10/23  0853 02/11/23  0550   INR 0.99 1.04       Objective:   Vitals: BP (!) 113/58   Pulse 86   Temp 98.4 °F (36.9 °C) (Oral)   Resp 12   Wt 215 lb (97.5 kg)   SpO2 98%   BMI 32.69 kg/m²   General appearance: alert and cooperative with exam, in no acute distress  HEENT: normocephalic, atraumatic,   Neck: supple, trachea midline  Lungs: breathing comfortably on room air  Heart[de-identified] regular rate and rhythm,   Extremities: extremities atraumatic, no cyanosis or edema  Neurologic: alert, oriented, follows commands, interactive    MEDICAL DECISION MAKING     Patient Active Problem List    Diagnosis Date Noted    Chronic heart failure, unspecified heart failure type ([address] 86/50/6355    Alcoholic cirrhosis of liver with ascites ([address] 01/31/2023    Coagulase negative Staphylococcus bacteremia 01/31/2023    Stage 3 chronic kidney disease ([address] 01/09/2023    Bacteremia due to methicillin resistant Staphylococcus epidermidis 01/09/2023    Sleep related hypoxia 12/06/2022    AMS (altered mental status) 11/12/2022    Hypoxia 11/07/2022    Pleural effusion 10/04/2022    Recurrent pleural effusion on right 09/09/2022    Ex-smoker 08/15/2022    Obesity (BMI 30-39.9) 08/15/2022    Type 2 diabetes mellitus with chronic kidney disease 07/12/2022    Thyroid disease 06/21/2022    Cellulitis of left lower extremity 06/08/2022    Type 2 diabetes mellitus without complication, without long-term current use of insulin ([address] 06/08/2022    Generalized weakness     Oropharyngeal dysphagia     Acute kidney injury superimposed on chronic kidney disease ([address]     Acute respiratory failure ([address] 05/04/2022    Recurrent right pleural effusion 05/02/2022    Septicemia ([address] 03/19/2022    Acute metabolic encephalopathy 15/41/0642    Hearing loss 03/11/2022    Decompensation of cirrhosis of liver ([facility]     Secondary esophageal varices without bleeding ([facility])     Gastric polyps     SOB (shortness of breath) 09/13/2021    Portal hypertension ([address] 08/30/2021    Bilateral hearing loss 05/07/2021    Increased ammonia level 05/07/2021    Class 3 severe obesity with body mass index (BMI) of 40.0 to 44.9 in adult [facility]) 09/21/2020    Hyperglycemia 12/04/2019    Acquired hypothyroidism 12/04/2019    Pulmonary nodules 09/09/2019    Ascites due to alcoholic cirrhosis ([facility] 27/69/9652    Mixed hyperlipidemia 08/08/2019    Hypertension 08/08/2019    History of alcohol abuse 08/08/2019    Gastroesophageal reflux disease 08/08/2019    Anxiety 08/08/2019    Shortness of breath 05/26/2019    History of colonic polyps 01/15/2019     Cr stable at 2.1  Resume his home diuretics on dc    Thank you                  Electronically signed by Yolis Perdue DO on 2/13/2023 at 6:48 AM    [facility]  MD Christa Morales DO [address],  [address], [place] [address]  PHONE: [phone]  FAX: [phone]

## 2023-02-14 PROBLEM — I33.0 ACUTE BACTERIAL ENDOCARDITIS: Status: ACTIVE | Noted: 2023-02-14

## 2023-02-14 LAB
AMMONIA: 119 UMOL/L (ref 16–60)
ANION GAP SERPL CALCULATED.3IONS-SCNC: 9 MMOL/L (ref 4–16)
BUN SERPL-MCNC: 34 MG/DL (ref 6–23)
CALCIUM SERPL-MCNC: 8.9 MG/DL (ref 8.3–10.6)
CHLORIDE BLD-SCNC: 101 MMOL/L (ref 99–110)
CO2: 24 MMOL/L (ref 21–32)
CREAT SERPL-MCNC: 1.9 MG/DL (ref 0.9–1.3)
DOSE AMOUNT: NORMAL
DOSE TIME: NORMAL
GFR SERPL CREATININE-BSD FRML MDRD: 36 ML/MIN/1.73M2
GLUCOSE SERPL-MCNC: 96 MG/DL (ref 70–99)
POTASSIUM SERPL-SCNC: 4.3 MMOL/L (ref 3.5–5.1)
SODIUM BLD-SCNC: 134 MMOL/L (ref 135–145)
TOTAL CK: 49 IU/L (ref 38–174)
VANCOMYCIN RANDOM: 21 UG/ML

## 2023-02-14 PROCEDURE — 6370000000 HC RX 637 (ALT 250 FOR IP): Performed by: NURSE PRACTITIONER

## 2023-02-14 PROCEDURE — 2580000003 HC RX 258: Performed by: NURSE PRACTITIONER

## 2023-02-14 PROCEDURE — 6360000002 HC RX W HCPCS: Performed by: NURSE PRACTITIONER

## 2023-02-14 PROCEDURE — 94761 N-INVAS EAR/PLS OXIMETRY MLT: CPT

## 2023-02-14 PROCEDURE — 6370000000 HC RX 637 (ALT 250 FOR IP): Performed by: STUDENT IN AN ORGANIZED HEALTH CARE EDUCATION/TRAINING PROGRAM

## 2023-02-14 PROCEDURE — 36415 COLL VENOUS BLD VENIPUNCTURE: CPT

## 2023-02-14 PROCEDURE — 2580000003 HC RX 258: Performed by: INTERNAL MEDICINE

## 2023-02-14 PROCEDURE — 82140 ASSAY OF AMMONIA: CPT

## 2023-02-14 PROCEDURE — 80048 BASIC METABOLIC PNL TOTAL CA: CPT

## 2023-02-14 PROCEDURE — 82550 ASSAY OF CK (CPK): CPT

## 2023-02-14 PROCEDURE — 80202 ASSAY OF VANCOMYCIN: CPT

## 2023-02-14 PROCEDURE — 99231 SBSQ HOSP IP/OBS SF/LOW 25: CPT | Performed by: NURSE PRACTITIONER

## 2023-02-14 PROCEDURE — 1200000000 HC SEMI PRIVATE

## 2023-02-14 RX ADMIN — RIFAXIMIN 550 MG: 550 TABLET ORAL at 09:13

## 2023-02-14 RX ADMIN — LACTULOSE 20 G: 10 SOLUTION ORAL at 14:05

## 2023-02-14 RX ADMIN — ENOXAPARIN SODIUM 40 MG: 100 INJECTION SUBCUTANEOUS at 09:14

## 2023-02-14 RX ADMIN — RIFAXIMIN 550 MG: 550 TABLET ORAL at 20:27

## 2023-02-14 RX ADMIN — PANTOPRAZOLE SODIUM 40 MG: 40 TABLET, DELAYED RELEASE ORAL at 06:19

## 2023-02-14 RX ADMIN — LACTULOSE 20 G: 10 SOLUTION ORAL at 20:27

## 2023-02-14 RX ADMIN — LACTULOSE 20 G: 10 SOLUTION ORAL at 09:13

## 2023-02-14 RX ADMIN — DAPTOMYCIN 650 MG: 500 INJECTION, POWDER, LYOPHILIZED, FOR SOLUTION INTRAVENOUS at 16:20

## 2023-02-14 RX ADMIN — SODIUM CHLORIDE, PRESERVATIVE FREE 10 ML: 5 INJECTION INTRAVENOUS at 09:14

## 2023-02-14 RX ADMIN — SODIUM CHLORIDE, PRESERVATIVE FREE 10 ML: 5 INJECTION INTRAVENOUS at 20:26

## 2023-02-14 RX ADMIN — LEVOTHYROXINE SODIUM 50 MCG: 0.05 TABLET ORAL at 06:19

## 2023-02-14 RX ADMIN — NADOLOL 40 MG: 20 TABLET ORAL at 20:31

## 2023-02-14 ASSESSMENT — PAIN SCALES - GENERAL
PAINLEVEL_OUTOF10: 0

## 2023-02-14 NOTE — PROGRESS NOTES
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 Incomplete visualization 2 foci masslike pulmonary consolidation right lung measuring 4 cm in greatest dimension consistent with malignancy or round atelectasis. Incomplete visualization mediastinal lymphadenopathy similar to   prior studies dating back to 2022 with maximal short axis dimension 1.3   cm. Diffuse mucosal thickening throughout the stomach which may reflect   incomplete distention, portal gastropathy diffuse gastritis. Finding is new/worse compared to 2023. Endoscopic evaluation would be helpful for further evaluation if clinically indicated. Diffuse mesenteric lymphadenopathy similar to prior studies dating back to   2022 with maximal short axis dimension of 12 mm. Diffuse infiltrative changes subcutaneous soft tissues right abdominal wall,   diffuse fatty infiltrative change right abdominal wall musculature. No   subcutaneous gas density or drainable fluid collection. Findings are   consistent with contusion, edema, cellulitis, history of multiple paracenteses. Correlation with clinical findings suggested.    Gynecomastia and stable subcutaneous 1.4 cm nodule medial aspect of the right   breast compared to prior studies dating back to 2022   2/10-S/p IR paracentesis:  Cultures: NGTD  De-Compensated Alcoholic Cirrhosis with Ascites and Acute Hepatic Encephalopathy:  Dr. Enmanuel Ribeiro onboard  Imp of no SBP   Rec continue Xifaxan and Nadolol   CANDIE -Possibly due to vancomycin: on CKD3:  Dr. Dylon Baxter onboard  Currently Cr 2.1 with baseline of 1.3  Rec to use another ABX besides vancomycin    JENNIFER:  Pulmonary Nodules:  HTN/ HFpEF/ Elevated Troponin:  Dr. Billie Brower onboard  Imp of elevated trop probably secondary to CKD2  Hearing Impaired:  Obesity:  BMI: 33.15  Multi-morbidity: per PMHx     Plan:  Continue IV daptomycin 8 mg/kg q24h with end date 3/13/2023 (as CrCl is 34, above 30, if decreases below 30 will change to q48h)  Check CK q3d while on daptomcin  Trend CRP and Pct, order  When ready for DC: Follow up in ID clinic in 1 week please  Weekly labs drawn on Monday during the course of treatment  CBC with differential, CMP, ESR, CRP, CK  Fax results to Attn: [facility] Infectious Diseases Staff  # [phone]   OK from ID standpoint to DC when ready    Ongoing Antimicrobial Therapy  Daptomycin 2/13-  Completed Antimicrobial Therapy  Vancomycin 1/30-6 week duration with planned end date 3/13/2023? Vancomycin 2/10-13? History:? Interval history noted. 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 Vancomycin toxicity. Denies n/v/d/f or untoward effects of antibiotics  Physical Exam:  Vital Signs: /74   Pulse 84   Temp 97.5 °F (36.4 °C) (Axillary)   Resp 20   Wt 214 lb (97.1 kg)   SpO2 95%   BMI 32.54 kg/m²     Gen: remains alert, intermittently confused, hearing impaired  Chest: no distress and CTA. Good air movement anterior breath sounds. Oxygen per NC  Heart: NSR and no MRG. Abd: soft, non-distended, no tenderness, no hepatomegaly. Normoactive bowel sounds. NGT:  Intact left nares to LIS. Ext: no clubbing, cyanosis, or edema  Neuro: Mental status intact. CN 2-12 intact and no focal sensory or motor deficits     Radiologic / Imaging / TESTING  2/10/2023 XR Chest Portable:  Impression   Right PICC with tip in the region of the right atrium. Right upper lung consolidation consolidation remains similar to 01/23/2023.        Small right pleural effusion. 2/10/2023 CT Head WO Contrast:  Impression   No noncontrast CT evidence of acute intracranial pathology or acute   posttraumatic change       Cortical atrophy, white matter changes consistent with microvascular   degenerative disease, atherosclerotic calcification distal internal carotid   and vertebral arteries of uncertain hemodynamic significance, dental tamera   right upper incisor. 2/10/2023 CT Abdomen Pelvis WO Contrast:  Impression   Incomplete visualization moderate-large right pleural effusion,   moderate-large volume ascites and findings consistent with underlying hepatic   cirrhosis. Incomplete visualization 2 foci masslike pulmonary consolidation right lung   measuring 4 cm in greatest dimension consistent with malignancy or round   atelectasis. Incomplete visualization mediastinal lymphadenopathy similar to   prior studies dating back to 05/05/2022 with maximal short axis dimension 1.3   cm. Diffuse mucosal thickening throughout the stomach which may reflect   incomplete distention, portal gastropathy diffuse gastritis. Finding is   new/worse compared to 01/04/2023. Endoscopic evaluation would be helpful for   further evaluation if clinically indicated. Diffuse mesenteric lymphadenopathy similar to prior studies dating back to   05/05/2022 with maximal short axis dimension of 12 mm. Diffuse infiltrative changes subcutaneous soft tissues right abdominal wall,   diffuse fatty infiltrative change right abdominal wall musculature. No   subcutaneous gas density or drainable fluid collection. Findings are   consistent with contusion, edema, cellulitis, history of multiple   paracenteses. Correlation with clinical findings suggested. Gynecomastia and stable subcutaneous 1.4 cm nodule medial aspect of the right   breast compared to prior studies dating back to 05/05/2022      2/10/2023 XR Abdomen for NG Placement;   Impression   Nasogastric tube tip in decompressed stomach with side port at the level of   the gastroesophageal junction. Advanced nasogastric tube by approximately 10   cm suggested to ensure adequate gastric decompression        Labs:    Recent Results (from the past 24 hour(s))   Basic Metabolic Panel    Collection Time: 02/14/23  5:44 AM   Result Value Ref Range    Sodium 134 (L) 135 - 145 MMOL/L    Potassium 4.3 3.5 - 5.1 MMOL/L    Chloride 101 99 - 110 mMol/L    CO2 24 21 - 32 MMOL/L    Anion Gap 9 4 - 16    BUN 34 (H) 6 - 23 MG/DL    Creatinine 1.9 (H) 0.9 - 1.3 MG/DL    Est, Glom Filt Rate 36 (L) >60 mL/min/1.73m2    Glucose 96 70 - 99 MG/DL    Calcium 8.9 8.3 - 10.6 MG/DL   Vancomycin Level, Random    Collection Time: 02/14/23  5:44 AM   Result Value Ref Range    Vancomycin Rm 21.0 UG/ML    DOSE AMOUNT DOSE AMT.  GIVEN - UNKNOWN     DOSE TIME DOSE TIME GIVEN - UNKNOWN    Ammonia    Collection Time: 02/14/23  5:44 AM   Result Value Ref Range    Ammonia 119 (H) 16 - 60 UMOL/L     CULTURE results: Invalid input(s): BLOOD CULTURE,  URINE CULTURE, SURGICAL CULTURE    Diagnosis:  Patient Active Problem List   Diagnosis    Shortness of breath    History of colonic polyps    Ascites due to alcoholic cirrhosis (HCC)    Mixed hyperlipidemia    Hypertension    History of alcohol abuse    Gastroesophageal reflux disease    Anxiety    Pulmonary nodules    Hyperglycemia    Acquired hypothyroidism    Class 3 severe obesity with body mass index (BMI) of 40.0 to 44.9 in adult [facility])    Bilateral hearing loss    Increased ammonia level    Portal hypertension (HCC)    SOB (shortness of breath)    Decompensation of cirrhosis of liver (HCC)    Secondary esophageal varices without bleeding (HCC)    Gastric polyps    Hearing loss    Septicemia (HCC)    Acute metabolic encephalopathy    Recurrent right pleural effusion    Acute respiratory failure (HCC)    Acute kidney injury superimposed on chronic kidney disease (HCC)    Generalized weakness    Oropharyngeal dysphagia    Cellulitis of left lower extremity    Type 2 diabetes mellitus without complication, without long-term current use of insulin (HCC)    Thyroid disease    Type 2 diabetes mellitus with chronic kidney disease    Ex-smoker    Obesity (BMI 30-39. 9)    Recurrent pleural effusion on right    Pleural effusion    Hypoxia    AMS (altered mental status)    Sleep related hypoxia    Stage 3 chronic kidney disease (HCC)    Bacteremia due to methicillin resistant Staphylococcus epidermidis    Alcoholic cirrhosis of liver with ascites (HCC)    Coagulase negative Staphylococcus bacteremia    Chronic heart failure, unspecified heart failure type (HCC)    Portal systemic encephalopathy       Active Problems  Principal Problem:    AMS (altered mental status)  Active Problems:    Portal systemic encephalopathy  Resolved Problems:    * No resolved hospital problems. *    Electronically signed by: Electronically signed by Selby Primrose.  MOHINDER Hall CNP on 2/14/2023 at 12:30 PM

## 2023-02-14 NOTE — PROGRESS NOTES
Nephrology Progress Note        [address], [address] [address]  Phone: [phone]  Office Hours: 8:30AM - 4:30PM  Monday - Friday 2/14/2023 7:54 AM  Subjective:   Admit Date: 2/10/2023  PCP: Kate Choe MD  Interval History:   On room air  Doing ok  BP is ok    Diet: ADULT DIET; Regular; 3 carb choices (45 gm/meal); Low Fat/Low Chol/High Fiber/CARLOS; Low Sodium (2 gm)      Data:   Scheduled Meds:   daptomycin (CUBICIN) IVPB  8 mg/kg (Adjusted) IntraVENous Q24H    pantoprazole  40 mg Oral QAM AC    lidocaine PF  5 mL IntraDERmal Once    sodium chloride flush  5-40 mL IntraVENous 2 times per day    sodium chloride flush  5-40 mL IntraVENous 2 times per day    enoxaparin  40 mg SubCUTAneous Daily    lactulose  20 g Oral TID    levothyroxine  50 mcg Oral Daily    nadolol  40 mg Oral Nightly    [Held by provider] furosemide  40 mg Oral Daily    [Held by provider] spironolactone  100 mg Oral BID    rifAXIMin  550 mg Oral BID     Continuous Infusions:   sodium chloride      sodium chloride       PRN Meds:sodium chloride flush, sodium chloride, sodium chloride flush, sodium chloride, ondansetron **OR** ondansetron, polyethylene glycol, acetaminophen **OR** acetaminophen  I/O last 3 completed shifts: In: 5 [I.V.:5]  Out: -   No intake/output data recorded. Intake/Output Summary (Last 24 hours) at 2/14/2023 0754  Last data filed at 2/13/2023 0824  Gross per 24 hour   Intake 5 ml   Output --   Net 5 ml       CBC: No results for input(s): WBC, HGB, PLT in the last 72 hours.     BMP:    Recent Labs     02/12/23  0422 02/13/23  0517 02/14/23  0544    137 134*   K 4.0 4.3 4.3    102 101   CO2 25 27 24   BUN 36* 34* 34*   CREATININE 2.2* 2.1* 1.9*   GLUCOSE 90 111* 96         Objective:   Vitals: /64   Pulse 84   Temp 98.4 °F (36.9 °C) (Oral)   Resp 12   Wt 214 lb (97.1 kg)   SpO2 94%   BMI 32.54 kg/m²   General appearance:  in no acute distress  HEENT: normocephalic, atraumatic,   Neck: supple, trachea midline  Lungs: breathing comfortably on room air  Extremities: extremities atraumatic, no cyanosis or edema  Neurologic: alert    MEDICAL DECISION MAKING     Patient Active Problem List    Diagnosis Date Noted    Portal systemic encephalopathy 02/13/2023    Chronic heart failure, unspecified heart failure type ([address] 96/61/3353    Alcoholic cirrhosis of liver with ascites ([address] 01/31/2023    Coagulase negative Staphylococcus bacteremia 01/31/2023    Stage 3 chronic kidney disease ([address] 01/09/2023    Bacteremia due to methicillin resistant Staphylococcus epidermidis 01/09/2023    Sleep related hypoxia 12/06/2022    AMS (altered mental status) 11/12/2022    Hypoxia 11/07/2022    Pleural effusion 10/04/2022    Recurrent pleural effusion on right 09/09/2022    Ex-smoker 08/15/2022    Obesity (BMI 30-39.9) 08/15/2022    Type 2 diabetes mellitus with chronic kidney disease 07/12/2022    Thyroid disease 06/21/2022    Cellulitis of left lower extremity 06/08/2022    Type 2 diabetes mellitus without complication, without long-term current use of insulin ([address] 06/08/2022    Generalized weakness     Oropharyngeal dysphagia     Acute kidney injury superimposed on chronic kidney disease ([address]     Acute respiratory failure ([address] 05/04/2022    Recurrent right pleural effusion 05/02/2022    Septicemia ([address] 32/66/4764    Acute metabolic encephalopathy 67/12/1206    Hearing loss 03/11/2022    Decompensation of cirrhosis of liver ([address]     Secondary esophageal varices without bleeding ([address]     Gastric polyps     SOB (shortness of breath) 09/13/2021    Portal hypertension ([address] 08/30/2021    Bilateral hearing loss 05/07/2021    Increased ammonia level 05/07/2021    Class 3 severe obesity with body mass index (BMI) of 40.0 to 44.9 in adult [facility]) 09/21/2020    Hyperglycemia 12/04/2019    Acquired hypothyroidism 12/04/2019    Pulmonary nodules 09/09/2019    Ascites due to alcoholic cirrhosis ([facility] 08/08/2019    Mixed hyperlipidemia 08/08/2019    Hypertension 08/08/2019    History of alcohol abuse 08/08/2019    Gastroesophageal reflux disease 08/08/2019    Anxiety 08/08/2019    Shortness of breath 05/26/2019    History of colonic polyps 01/15/2019     Cr down to 1.9  You may resume his home diuretics on dc  Avoid nephrotoxins    Thank you                  Electronically signed by Shante Casillas DO on 2/14/2023 at 7:54 AM    [facility]  MD Brigida Le DO [address],  [place]  [facility], [facility] [address]  PHONE: [phone]  FAX: [phone]

## 2023-02-14 NOTE — PLAN OF CARE
Problem: Safety - Violent/Self-destructive Restraint  Goal: Remains free of injury from restraints (Restraint for Violent/Self-Destructive Behavior)  Description: INTERVENTIONS:  1. Determine that de-escalation and other, less restrictive measures have been tried or would not be effective before applying the restraint  2. Identify and document the criteria for restraint  3. Evaluate the patient's condition at the time of restraint application  4. Inform patient/family regarding the reason for restraint/seclusion  5. Q2H: Monitor comfort, nutrition and hydration needs  6. Q15M: Perform safety checks including skin, circulation, sensory, respiratory and psychological status  7. Ensure continuous observation  8. Identify and implement measures to help patient regain control, assess readiness for release and initiate progressive release per policy  Outcome: Progressing     Problem: Discharge Planning  Goal: Discharge to home or other facility with appropriate resources  Outcome: Progressing     Problem: Safety - Medical Restraint  Goal: Remains free of injury from restraints (Restraint for Interference with Medical Device)  Description: INTERVENTIONS:  1. Determine that other, less restrictive measures have been tried or would not be effective before applying the restraint  2. Evaluate the patient's condition at the time of restraint application  3. Inform patient/family regarding the reason for restraint  4.  Q2H: Monitor safety, psychosocial status, comfort, nutrition and hydration  Outcome: Progressing     Problem: Safety - Adult  Goal: Free from fall injury  Outcome: Progressing     Problem: Pain  Goal: Verbalizes/displays adequate comfort level or baseline comfort level  Outcome: Progressing     Problem: ABCDS Injury Assessment  Goal: Absence of physical injury  Outcome: Progressing     Problem: Chronic Conditions and Co-morbidities  Goal: Patient's chronic conditions and co-morbidity symptoms are monitored and maintained or improved  Outcome: Progressing

## 2023-02-14 NOTE — PROGRESS NOTES
V2.0  [facility] Hospitalist Progress Note      Name:  Jeb Moyer /Age/Sex: 1945  (78 y.o. male)   MRN & CSN:  [id number] & [id number] Encounter Date/Time: 2023 3:06 PM EST    Location:  [address] [place] [address] PCP: Nicolasa Marinelli, [address] Day: 5    Assessment and Plan:   Jeb Moyer is a 78 y.o. male with pmh of  who presents with AMS (altered mental status)    Acute metabolic encephalopathy likely due to hepatic encephalopathy    CT of the head nonacute.   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 Fluid analysis not suggestive for SBP. Continue paracentesis every 2 weeks and thoracentesis prn  -Holding diuretics for now but continue on discharge per nephrology  -Continue lactulose and rifaximin  -Pt not a candidate for TIPS due to PSE and MELD likely too low for transplant, GI recommends repeat evaluation at [address] [place] [address]    Tricuspid valve endocarditis  Staph epi bacteremia  -As noted on EVA done on 2023.  -Patient had staph epi bacteremia. Positive blood cultures on 2023. Was getting IV antibiotics outpatient.  -Transitioned to IV daptomycin until 3/13/2023 (if CrCl decreases below 30, change to q48h)  -Check CK q3d while on daptomycin  -F/u with ID clinic in 1 week following discharge     Acute kidney injury on CKD stage III   -Dehydration versus hepatorenal versus vancomycin use  -Creatinine on presentation 2.2 mg/DL. Today baseline creatinine appears to be around 1.2 1.3 mg/DL. -Nephrology on board. Depression  Pt very frustrated with his medical conditions that result in multiple hospitalizations, feels hopeless and doesn't know how to go on in life.  Did mention \"I don't want to live like this\" as he grieves about his lack of quality of life or ability to enjoy things that he wants to do. No specific suicidal ideation or plan. Pt open to medication management. -Psychiatry consulted    Elevated trop  -Likely demand ischemia and CKD  -Evaluated by cardiology. Other chronic medical conditions-resume home medications as contraindicated  # JENNIFER/Chronic respiratory failure on O2 2l per nc-on recent admission patient was managed on BiPAP nightly and as needed, per pts wife pt is not on home BIPAP, continue O2 @ 2l per nc, monitor pulse ox  # Essential hypertension -currently on naldalol and diuretics, BP stable  # Acquired hypothyroidism-resume levothyroxine, f/u tsh  # History right hydrothorax status post right thoracentesis on recent hospitalization. Monitor need for repeat thoracentesis. Consider pulm consultation  # GERD on ppi  #Burning urination. UA unremarkable. Follow urine culture    Diet ADULT DIET; Regular; 3 carb choices (45 gm/meal); Low Fat/Low Chol/High Fiber/CARLOS; Low Sodium (2 gm)   DVT Prophylaxis [x] Lovenox, []  Heparin, [] SCDs, [] Ambulation,  [] Eliquis, [] Xarelto  [] Coumadin   Code Status Full Code   Disposition From: Home  Expected Disposition: Home  Estimated Date of Discharge: 1 to 2 days  Patient requires continued admission due to evaluation for depression   Surrogate Decision Maker/ POA      Subjective:     Chief Complaint: Altered Mental Status       Robby Gaitan is a 78 y.o. male who presents with encephalopathy. He is hard of hearing. He feels very frustrated and depressed at always ending up in the hospital. [employer] that he doesn't want to go on like this but has no plan to end things. Wife at bedside providing support.       Review of Systems:    Review of Systems    As above     Objective:   No intake or output data in the 24 hours ending 02/14/23 1636       Vitals:   Vitals:    02/14/23 1400   BP: 116/66   Pulse: 85   Resp: 18   Temp: 97.7 °F (36.5 °C)   SpO2: 99%       Physical Exam:     General: NAD  Eyes: EOMI  ENT: neck supple  Cardiovascular: Regular rate. Respiratory: Clear to auscultation  Gastrointestinal: Soft, non tender  Genitourinary: no suprapubic tenderness  Musculoskeletal: No edema  Skin: warm, dry  Neuro: Alert. Psych: Mood appropriate. Medications:   Medications:    daptomycin (CUBICIN) IVPB  8 mg/kg (Adjusted) IntraVENous Q24H    pantoprazole  40 mg Oral QAM AC    lidocaine PF  5 mL IntraDERmal Once    sodium chloride flush  5-40 mL IntraVENous 2 times per day    sodium chloride flush  5-40 mL IntraVENous 2 times per day    enoxaparin  40 mg SubCUTAneous Daily    lactulose  20 g Oral TID    levothyroxine  50 mcg Oral Daily    nadolol  40 mg Oral Nightly    [Held by provider] furosemide  40 mg Oral Daily    [Held by provider] spironolactone  100 mg Oral BID    rifAXIMin  550 mg Oral BID      Infusions:    sodium chloride      sodium chloride       PRN Meds: sodium chloride flush, 5-40 mL, PRN  sodium chloride, , PRN  sodium chloride flush, 5-40 mL, PRN  sodium chloride, , PRN  ondansetron, 4 mg, Q8H PRN   Or  ondansetron, 4 mg, Q6H PRN  polyethylene glycol, 17 g, Daily PRN  acetaminophen, 650 mg, Q6H PRN   Or  acetaminophen, 650 mg, Q6H PRN      Labs      Recent Results (from the past 24 hour(s))   Basic Metabolic Panel    Collection Time: 02/14/23  5:44 AM   Result Value Ref Range    Sodium 134 (L) 135 - 145 MMOL/L    Potassium 4.3 3.5 - 5.1 MMOL/L    Chloride 101 99 - 110 mMol/L    CO2 24 21 - 32 MMOL/L    Anion Gap 9 4 - 16    BUN 34 (H) 6 - 23 MG/DL    Creatinine 1.9 (H) 0.9 - 1.3 MG/DL    Est, Glom Filt Rate 36 (L) >60 mL/min/1.73m2    Glucose 96 70 - 99 MG/DL    Calcium 8.9 8.3 - 10.6 MG/DL   Vancomycin Level, Random    Collection Time: 02/14/23  5:44 AM   Result Value Ref Range    Vancomycin Rm 21.0 UG/ML    DOSE AMOUNT DOSE AMT.  GIVEN - UNKNOWN     DOSE TIME DOSE TIME GIVEN - UNKNOWN    Ammonia    Collection Time: 02/14/23  5:44 AM   Result Value Ref Range Ammonia 119 (H) 16 - 60 UMOL/L        Imaging/Diagnostics Last 24 Hours   CT ABDOMEN PELVIS WO CONTRAST Additional Contrast? None    Result Date: 2/10/2023  EXAMINATION: CT OF THE ABDOMEN AND PELVIS WITHOUT CONTRAST 2/10/2023 8:52 am TECHNIQUE: CT of the abdomen and pelvis was performed without the administration of intravenous contrast. Multiplanar reformatted images are provided for review. Automated exposure control, iterative reconstruction, and/or weight based adjustment of the mA/kV was utilized to reduce the radiation dose to as low as reasonably achievable. COMPARISON: Chest abdomen pelvis CT 01/04/2023, abdomen pelvis CT 05/05/2022 HISTORY: ORDERING SYSTEM PROVIDED HISTORY: ams/recent paracentesis TECHNOLOGIST PROVIDED HISTORY: Reason for exam:->ams/recent paracentesis Additional Contrast?->None Decision Support Exception - unselect if not a suspected or confirmed emergency medical condition->Emergency Medical Condition (MA) Reason for Exam: ams/recent paracentesis FINDINGS: Lower Chest: Incomplete visualization moderate-large right pleural effusion, masslike areas of pulmonary consolidation anterior right mid lung field and posterior right basilar region, diffuse bronchovascular marking prominence throughout visualized lung fields bilaterally. Correlation with history of right lung malignancy, congestive heart failure, bilateral bronchitis/pneumonitis. Decompressed small hiatal hernia also noted. Organs: Lobulated contour and volume loss of the liver consistent with cirrhosis. Otherwise, no acute noncontrast abnormality of the abdominal organs. Hypodense left renal mass unchanged compared to prior studies most consistent with cyst measuring 3-3.5 cm. GI/Bowel: Circumferential mucosal thickening of stomach new/worse compared to 01/04/2023 partially reflecting incomplete distention however, diffuse gastritis, portal gastropathy may produce this appearance.   Endoscopic evaluation would be helpful for further evaluation if clinically indicated. Otherwise, no acute abnormality of the GI tract. Pelvis: No acute noncontrast abnormality. Peritoneum/Retroperitoneum: Moderate volume ascites, lymphadenopathy throughout the mesentery similar to prior study with maximal short axis dimension of 12 mm unchanged compared to prior study. In the absence of known malignant disease, findings most consistent with reactive lymph node enlargement associated with ascites, hepatic cirrhosis. Mild atherosclerotic calcification throughout aortoiliac distribution. No aortic aneurysm. Small left inguinal hernia noted of questionable acute clinical significance. Bones/Soft Tissues: No acute abnormality. Degenerative disc disease lumbar spine with exiting nerve effacement/compression L5-S1 worse on the right. Bilateral gynecomastia noted. 1.4 cm nodule medial aspect of the right breast/anterior chest wall adjacent to deep surface of the skin which may reflect inflammatory process unchanged compared to 05/05/2022. Diffuse hypodensity right abdominal wall musculature and mild infiltrative change of the subcutaneous fat of the right abdomen. Differential considerations include diffuse cellulitis, contusion, edema and changes associated with history of multiple paracenteses. Correlation with clinical findings suggested. Incomplete visualization moderate-large right pleural effusion, moderate-large volume ascites and findings consistent with underlying hepatic cirrhosis. Incomplete visualization 2 foci masslike pulmonary consolidation right lung measuring 4 cm in greatest dimension consistent with malignancy or round atelectasis. Incomplete visualization mediastinal lymphadenopathy similar to prior studies dating back to 05/05/2022 with maximal short axis dimension 1.3 cm. Diffuse mucosal thickening throughout the stomach which may reflect incomplete distention, portal gastropathy diffuse gastritis.   Finding is new/worse compared to 01/04/2023. Endoscopic evaluation would be helpful for further evaluation if clinically indicated. Diffuse mesenteric lymphadenopathy similar to prior studies dating back to 05/05/2022 with maximal short axis dimension of 12 mm. Diffuse infiltrative changes subcutaneous soft tissues right abdominal wall, diffuse fatty infiltrative change right abdominal wall musculature. No subcutaneous gas density or drainable fluid collection. Findings are consistent with contusion, edema, cellulitis, history of multiple paracenteses. Correlation with clinical findings suggested. Gynecomastia and stable subcutaneous 1.4 cm nodule medial aspect of the right breast compared to prior studies dating back to 05/05/2022. CT HEAD WO CONTRAST    Result Date: 2/10/2023  EXAMINATION: CT OF THE HEAD WITHOUT CONTRAST  2/10/2023 8:52 am TECHNIQUE: CT of the head was performed without the administration of intravenous contrast. Automated exposure control, iterative reconstruction, and/or weight based adjustment of the mA/kV was utilized to reduce the radiation dose to as low as reasonably achievable. COMPARISON: 01/28/2023 HISTORY: ORDERING SYSTEM PROVIDED HISTORY: HEAD TRAUMA, CLOSED, MILD, GCS >= 13, NO RISK FACTORS, NEURO EXAM NORMAL TECHNOLOGIST PROVIDED HISTORY: Has a \"code stroke\" or \"stroke alert\" been called? ->No Reason for exam:->ams Decision Support Exception - unselect if not a suspected or confirmed emergency medical condition->Emergency Medical Condition (MA) Reason for Exam:  Head trauma, closed, mild, GCS FINDINGS: BRAIN/VENTRICLES: There is no acute intracranial hemorrhage, mass effect or midline shift. No abnormal extra-axial fluid collection. The gray-white differentiation is maintained without evidence of an acute infarct. There is no evidence of hydrocephalus.  Cortical atrophy, white matter changes consistent with microvascular degenerative disease, atherosclerotic calcification distal internal carotid and vertebral arteries of uncertain hemodynamic significance. ORBITS: The visualized portion of the orbits demonstrate no acute abnormality. SINUSES: The visualized paranasal sinuses and mastoid air cells demonstrate no acute abnormality. SOFT TISSUES/SKULL:  No acute abnormality of the visualized skull or soft tissues. Dental tamera right upper incisor suggested. No noncontrast CT evidence of acute intracranial pathology or acute posttraumatic change Cortical atrophy, white matter changes consistent with microvascular degenerative disease, atherosclerotic calcification distal internal carotid and vertebral arteries of uncertain hemodynamic significance, dental tamera right upper incisor. XR CHEST PORTABLE    Result Date: 2/10/2023  EXAMINATION: ONE XRAY VIEW OF THE CHEST 2/10/2023 8:51 am COMPARISON: 01/30/2023, 01/23/2023 HISTORY: ORDERING SYSTEM PROVIDED HISTORY: AMS TECHNOLOGIST PROVIDED HISTORY: Reason for exam:->AMS Reason for Exam: AMS FINDINGS: Right PICC with tip at the right atrium. Areas of consolidation in the right upper lung remain. Small right basilar pleural effusion remains. The left lung is clear. Heart size is normal.     Right PICC with tip in the region of the right atrium. Right upper lung consolidation consolidation remains similar to 01/23/2023. Small right pleural effusion. IR US GUIDED PARACENTESIS    Result Date: 2/10/2023  PROCEDURE: PARACENTESIS WITHOUT IMAGE GUIDANCE US ABDOMEN LIMITED 2/10/2023 HISTORY: ORDERING SYSTEM PROVIDED HISTORY: ascites TECHNOLOGIST PROVIDED HISTORY: Reason for exam:->ascites TECHNIQUE: Informed consent was obtained after a detailed explanation of the procedure including risks, benefits, and alternatives. Universal protocol was followed. A limited ultrasound of the abdomen was performed. The right abdomen was prepped and draped in sterile fashion and local anesthesia was achieved with lidocaine.   A 6 Bengali Safe-T-Centesis catheter  was advanced into ascites and paracentesis was performed. Access was lost during the paracentesis and a 5 Icelandic one-step sheathed needle was advanced back into the ascites and paracentesis was resumed. The patient tolerated the procedure well. FINDINGS: Limited ultrasound of the abdomen demonstrates ascites. 2 sonographic images were obtained. A total of 3125 cc of cloudy pink fluid was removed. 525 cc of the fluid was sent to the lab for analysis. Successful paracentesis. XR ABDOMEN FOR NG/OG/NE TUBE PLACEMENT    Result Date: 2/10/2023  EXAMINATION: ONE SUPINE XRAY VIEW(S) OF THE ABDOMEN 2/10/2023 6:58 pm COMPARISON: None. HISTORY: ORDERING SYSTEM PROVIDED HISTORY: Confirmation of course of NG/OG/NE tube and location of tip of tube TECHNOLOGIST PROVIDED HISTORY: Reason for exam:->Confirmation of course of NG/OG/NE tube and location of tip of tube Portable? ->Yes Reason for Exam: Confirmation of course of NG/OG/NE tube and location of tip of tube Additional signs and symptoms: ams FINDINGS: The tip of the NG/OG tube and side hole/port are in good position in the mid stomach. There is a central venous catheter with the tip in the right atrium. The OG/NG tube has been placed in good position. XR ABDOMEN FOR NG/OG/NE TUBE PLACEMENT    Result Date: 2/10/2023  EXAMINATION: ONE SUPINE XRAY VIEW(S) OF THE ABDOMEN 2/10/2023 11:46 am COMPARISON: 05/08/2022 HISTORY: ORDERING SYSTEM PROVIDED HISTORY: ng tube TECHNOLOGIST PROVIDED HISTORY: Abd KUB Reason for exam:->ng tube Portable? ->Yes Reason for Exam: ng tube FINDINGS: Nasogastric tube tip within the stomach with side port at the level of the gastroesophageal junction. Advanced nasogastric tube by approximately 10 cm suggested to ensure adequate gastric decompression. Visualized abdomen demonstrates nonspecific partial distention of small bowel to maximal diameter of 2.6 cm. Visualized GI tract demonstrates no bowel wall edema or mucosal thickening.   No free intraperitoneal air or significant ascites in the visualized abdomen. Nasogastric tube tip in decompressed stomach with side port at the level of the gastroesophageal junction. Advanced nasogastric tube by approximately 10 cm suggested to ensure adequate gastric decompression.        Electronically signed by Margarita Zhong MD on 2/14/2023 at 4:36 PM

## 2023-02-15 VITALS
RESPIRATION RATE: 14 BRPM | SYSTOLIC BLOOD PRESSURE: 95 MMHG | TEMPERATURE: 97.8 F | BODY MASS INDEX: 33.68 KG/M2 | DIASTOLIC BLOOD PRESSURE: 59 MMHG | OXYGEN SATURATION: 98 % | WEIGHT: 221.5 LBS | HEART RATE: 79 BPM

## 2023-02-15 LAB
ANION GAP SERPL CALCULATED.3IONS-SCNC: 9 MMOL/L (ref 4–16)
BUN SERPL-MCNC: 36 MG/DL (ref 6–23)
CALCIUM SERPL-MCNC: 8.9 MG/DL (ref 8.3–10.6)
CHLORIDE BLD-SCNC: 102 MMOL/L (ref 99–110)
CO2: 25 MMOL/L (ref 21–32)
CREAT SERPL-MCNC: 1.8 MG/DL (ref 0.9–1.3)
CULTURE: NORMAL
GFR SERPL CREATININE-BSD FRML MDRD: 39 ML/MIN/1.73M2
GLUCOSE SERPL-MCNC: 109 MG/DL (ref 70–99)
GRAM SMEAR: NORMAL
Lab: NORMAL
POTASSIUM SERPL-SCNC: 4 MMOL/L (ref 3.5–5.1)
SODIUM BLD-SCNC: 136 MMOL/L (ref 135–145)
SPECIMEN: NORMAL

## 2023-02-15 PROCEDURE — 05HB33Z INSERTION OF INFUSION DEVICE INTO RIGHT BASILIC VEIN, PERCUTANEOUS APPROACH: ICD-10-PCS | Performed by: STUDENT IN AN ORGANIZED HEALTH CARE EDUCATION/TRAINING PROGRAM

## 2023-02-15 PROCEDURE — 36410 VNPNXR 3YR/> PHY/QHP DX/THER: CPT

## 2023-02-15 PROCEDURE — 36415 COLL VENOUS BLD VENIPUNCTURE: CPT

## 2023-02-15 PROCEDURE — 6370000000 HC RX 637 (ALT 250 FOR IP): Performed by: NURSE PRACTITIONER

## 2023-02-15 PROCEDURE — 6360000002 HC RX W HCPCS: Performed by: NURSE PRACTITIONER

## 2023-02-15 PROCEDURE — 2580000003 HC RX 258: Performed by: NURSE PRACTITIONER

## 2023-02-15 PROCEDURE — 2580000003 HC RX 258: Performed by: STUDENT IN AN ORGANIZED HEALTH CARE EDUCATION/TRAINING PROGRAM

## 2023-02-15 PROCEDURE — 94761 N-INVAS EAR/PLS OXIMETRY MLT: CPT

## 2023-02-15 PROCEDURE — C1751 CATH, INF, PER/CENT/MIDLINE: HCPCS

## 2023-02-15 PROCEDURE — 80048 BASIC METABOLIC PNL TOTAL CA: CPT

## 2023-02-15 PROCEDURE — 2580000003 HC RX 258: Performed by: INTERNAL MEDICINE

## 2023-02-15 PROCEDURE — 6370000000 HC RX 637 (ALT 250 FOR IP): Performed by: STUDENT IN AN ORGANIZED HEALTH CARE EDUCATION/TRAINING PROGRAM

## 2023-02-15 PROCEDURE — 76937 US GUIDE VASCULAR ACCESS: CPT

## 2023-02-15 RX ORDER — SODIUM CHLORIDE 0.9 % (FLUSH) 0.9 %
5-40 SYRINGE (ML) INJECTION PRN
Status: DISCONTINUED | OUTPATIENT
Start: 2023-02-15 | End: 2023-02-15 | Stop reason: HOSPADM

## 2023-02-15 RX ORDER — SODIUM CHLORIDE 9 MG/ML
INJECTION, SOLUTION INTRAVENOUS PRN
Status: DISCONTINUED | OUTPATIENT
Start: 2023-02-15 | End: 2023-02-15 | Stop reason: HOSPADM

## 2023-02-15 RX ORDER — LIDOCAINE HYDROCHLORIDE 10 MG/ML
5 INJECTION, SOLUTION EPIDURAL; INFILTRATION; INTRACAUDAL; PERINEURAL ONCE
Status: DISCONTINUED | OUTPATIENT
Start: 2023-02-15 | End: 2023-02-15 | Stop reason: HOSPADM

## 2023-02-15 RX ORDER — SODIUM CHLORIDE 0.9 % (FLUSH) 0.9 %
5-40 SYRINGE (ML) INJECTION EVERY 12 HOURS SCHEDULED
Status: DISCONTINUED | OUTPATIENT
Start: 2023-02-15 | End: 2023-02-15 | Stop reason: HOSPADM

## 2023-02-15 RX ORDER — LACTULOSE 10 G/15ML
20 SOLUTION ORAL 3 TIMES DAILY
Qty: 2700 ML | Refills: 5 | Status: SHIPPED | OUTPATIENT
Start: 2023-02-15

## 2023-02-15 RX ADMIN — LEVOTHYROXINE SODIUM 50 MCG: 0.05 TABLET ORAL at 06:19

## 2023-02-15 RX ADMIN — LACTULOSE 20 G: 10 SOLUTION ORAL at 14:59

## 2023-02-15 RX ADMIN — SODIUM CHLORIDE, PRESERVATIVE FREE 10 ML: 5 INJECTION INTRAVENOUS at 08:52

## 2023-02-15 RX ADMIN — SODIUM CHLORIDE: 9 INJECTION, SOLUTION INTRAVENOUS at 15:00

## 2023-02-15 RX ADMIN — PANTOPRAZOLE SODIUM 40 MG: 40 TABLET, DELAYED RELEASE ORAL at 06:19

## 2023-02-15 RX ADMIN — RIFAXIMIN 550 MG: 550 TABLET ORAL at 08:52

## 2023-02-15 RX ADMIN — DAPTOMYCIN 650 MG: 500 INJECTION, POWDER, LYOPHILIZED, FOR SOLUTION INTRAVENOUS at 15:01

## 2023-02-15 RX ADMIN — ENOXAPARIN SODIUM 40 MG: 100 INJECTION SUBCUTANEOUS at 08:52

## 2023-02-15 RX ADMIN — LACTULOSE 20 G: 10 SOLUTION ORAL at 08:52

## 2023-02-15 NOTE — CARE COORDINATION
Pt on discharge. CM gave printed/signed antibiotic script to Latosha/CHRISTI. Plan start of home care tomorrow 2/16. Pt denies any needs at this time.   CM following

## 2023-02-15 NOTE — CARE COORDINATION
Pts wife [facility] agreeable to co-pay of $26/day and she will learn how to infuse the atbs. Pt next dose due tomorrow. Pts nurse Kya aware that pt must get line prior to dc. [facility] and [facility] both received atb script.

## 2023-02-15 NOTE — CONSULTS
Initial Psychiatric History and Physical    [employer]  [id number]  2/10/2023  02/15/23    ID: Patient is a 78 yrs y.o. male    CC:I am fine    HPI: Patient is a 78year old male being treated at [facility] for hyperammonia, liver cirrhosis and ascites and history of alcoholic liver cirrhosis with ascites requiring paracentesis, essential hypertension, hyperlipidemia, JENNIFER, pulmonary nodules, GERD, recent hospitalization 1/28/2023 to 2/3/2023 for acute metabolic encephalopathy in setting of decompensated liver cirrhosis and Staph Epi bacteremia secondary to bacterial peritonitis among other co-morbidities after presenting with altered mental status. Consults include GI, ID, cardiology, nephrology, and Gastroenterology. Psychiatry consulted by Dr Lew Jang due to \"depression. 2/15/23--Per attending note- Pt very frustrated with his medical conditions that result in multiple hospitalizations, feels hopeless and doesn't know how to go on in life. Did mention \"I don't want to live like this\" as he grieves about his lack of quality of life or ability to enjoy things that he wants to do. No specific suicidal ideation or plan. Pt open to medication management. Met with patient and wife at bedside. Wife stayed per patient request. Patient alert and oriented x 4. He is [place]. He noted he made comments regarding \"blowing my head off\" and \"I don't like living like this,\" but \"While I don't like living like this, I won't kills my self. I have do many things to do like put a fan on the porch, fix my granddaugthers faucet and put a new pool cover on the pool. I don't like coming to the hospital!\" Discussed antidepressants go through his liver and can cause problems. Patient does not want to stop his cymbalta but it was held during this admission. He states he has tried to stop it before and then gets very depressed. \"Liver or no liver, I am not stopping my cymbalta. !!\"  Discussed non pharmacological options with patient and wife. Patient does not have access to weapons including guns, at home. Patient is also forward and future oriented and has no intentions of self harm. He has no hx of psychiatric admissions nor suicide attempts. Insight and judgment are adequate. Past Psychiatric History:   Cymbalta 60 mg po daily- MDD  Denies SI, SA or SIB. Denies psychiatric hospitalizations          Family Psychiatric History:   Family History   Problem Relation Age of Onset    Hypertension Brother     Diabetes Other         Allergies: Allergies   Allergen Reactions    Lisinopril Swelling     Tongue swelling      Zetia [Ezetimibe] Swelling     Facial swelling    Atorvastatin     Codeine Other (See Comments)     Blood pressure drops    Hydrochlorothiazide     Hydroxyzine      Fatigue and depressed    Lexapro [Escitalopram Oxalate]      Increased depression    Pravastatin         OBJECTIVE  Vital Signs:  Vitals:    02/15/23 1134   BP:    Pulse:    Resp:    Temp:    SpO2: 98%       Labs:  Recent Results (from the past 48 hour(s))   Basic Metabolic Panel    Collection Time: 02/14/23  5:44 AM   Result Value Ref Range    Sodium 134 (L) 135 - 145 MMOL/L    Potassium 4.3 3.5 - 5.1 MMOL/L    Chloride 101 99 - 110 mMol/L    CO2 24 21 - 32 MMOL/L    Anion Gap 9 4 - 16    BUN 34 (H) 6 - 23 MG/DL    Creatinine 1.9 (H) 0.9 - 1.3 MG/DL    Est, Glom Filt Rate 36 (L) >60 mL/min/1.73m2    Glucose 96 70 - 99 MG/DL    Calcium 8.9 8.3 - 10.6 MG/DL   Vancomycin Level, Random    Collection Time: 02/14/23  5:44 AM   Result Value Ref Range    Vancomycin Rm 21.0 UG/ML    DOSE AMOUNT DOSE AMT.  GIVEN - UNKNOWN     DOSE TIME DOSE TIME GIVEN - UNKNOWN    Ammonia    Collection Time: 02/14/23  5:44 AM   Result Value Ref Range    Ammonia 119 (H) 16 - 60 UMOL/L   CK    Collection Time: 02/14/23  5:50 PM   Result Value Ref Range    Total CK 49 38 - 174 IU/L   Basic Metabolic Panel    Collection Time: 02/15/23  9:15 AM   Result Value Ref Range    Sodium 136 135 - 145 MMOL/L Potassium 4.0 3.5 - 5.1 MMOL/L    Chloride 102 99 - 110 mMol/L    CO2 25 21 - 32 MMOL/L    Anion Gap 9 4 - 16    BUN 36 (H) 6 - 23 MG/DL    Creatinine 1.8 (H) 0.9 - 1.3 MG/DL    Est, Glom Filt Rate 39 (L) >60 mL/min/1.73m2    Glucose 109 (H) 70 - 99 MG/DL    Calcium 8.9 8.3 - 10.6 MG/DL       Review of Systems:  Reports of no current cardiovascular, respiratory, gastrointestinal, genitourinary, integumentary, neurological, muscuoskeletal, or immunological symptoms today. PSYCHIATRIC: See HPI above. PSYCHIATRIC EXAMINATION / MENTAL STATUS EXAM    CONSTITUTIONAL:    Vitals:   Vitals:    02/15/23 1134   BP:    Pulse:    Resp:    Temp:    SpO2: 98%      General appearance: [x] appears age, []  appears older than stated age,               [x]  adequately dressed and groomed, [] disheveled,               [x]  in no acute distress, [] appears mildly distressed, [] other           MUSCULOSKELETAL:   Gait:   [] normal, [] antalgic, [] unsteady, [] gait not evaluated   Station:             [] erect, [] sitting, [] recumbent, [] other        Strength/tone:  [x] muscle strength and tone appear consistent with age and                                        condition     [] atrophy      [] abnormal movements     Vitals: Blood pressure (!) 95/59, pulse 79, temperature 97.8 °F (36.6 °C), temperature source Oral, resp. rate 14, weight 221 lb 8 oz (100.5 kg), SpO2 98 %. CONSTITUTIONAL:    Appearance: appears stated age. alert and oriented to person, place, time & situation. no acute distress. Adequate grooming and hygeine. Good eye contact. No prominent physical abnormalities. Attitude: Manner is cooperative and pleasant  Motor: No psychomotor agitation, retardation or abnormal movements noted  Speech: Clearly articulated; normal rate, volume, tone & amount.   Language: intact understanding and production  Mood: \"I was joking\"  Affect: irritable, full range, non-labile, congruent with mood and content of speech  Thought Production: Spontaneous. Thought Form: Coherent, linear, logical & goal-directed. No tangentiality or circumstantiality. No flight of ideas or loosening of associations. Thought Content/Perceptions: No AMINTA, no AVH, no delusion  Insight:fair  Judgment adequate  Memory: Immediate, recent, and remote appear intact, though not formally tested. Attention: maintained throughout interview  Fund of knowledge: Average  Gait/Balance: ROXANN    Impression:   Adjustment disorder  MDD, recurrent mild to moderate    Problem List:   AMS (altered mental status)    Plan:  Patient is ok to be dc from a psychiatric point of view when medically appropriate   Do not utiilze antipsychotics, antidepressants as they pass through the liver. Do not recommend restarting cymbalta.  While in hospital ativan may be helpful for agitation  Recommend counseling and light therapy  Psychiatry will sign off  Thank you for this consult  Discussed with Dr Juan F Gorman    Electronically signed by MOHINDER Robledo CNP on 2/15/2023 at 12:53 PM

## 2023-02-15 NOTE — PROGRESS NOTES
Obtain BMP today please  Doing ok    Patient Active Problem List    Diagnosis Date Noted    Acute bacterial endocarditis 02/14/2023    Portal systemic encephalopathy 02/13/2023    Chronic heart failure, unspecified heart failure type ([address] 48/14/4341    Alcoholic cirrhosis of liver with ascites ([address] 01/31/2023    Staphylococcus epidermidis bacteremia 01/31/2023    Stage 3 chronic kidney disease ([address] 01/09/2023    Bacteremia due to methicillin resistant Staphylococcus epidermidis 01/09/2023    Sleep related hypoxia 12/06/2022    AMS (altered mental status) 11/12/2022    Hypoxia 11/07/2022    Pleural effusion 10/04/2022    Recurrent pleural effusion on right 09/09/2022    Ex-smoker 08/15/2022    Obesity (BMI 30-39.9) 08/15/2022    Type 2 diabetes mellitus with chronic kidney disease 07/12/2022    Thyroid disease 06/21/2022    Cellulitis of left lower extremity 06/08/2022    Type 2 diabetes mellitus without complication, without long-term current use of insulin ([address] 06/08/2022    Generalized weakness     Oropharyngeal dysphagia     Acute kidney injury superimposed on chronic kidney disease ([address]     Acute respiratory failure ([address] 05/04/2022    Recurrent right pleural effusion 05/02/2022    Septicemia ([address] 70/41/8136    Acute metabolic encephalopathy 68/76/6748    Hearing loss 03/11/2022    Decompensation of cirrhosis of liver ([address]     Secondary esophageal varices without bleeding ([address]     Gastric polyps     SOB (shortness of breath) 09/13/2021    Portal hypertension ([address] 08/30/2021    Bilateral hearing loss 05/07/2021    Increased ammonia level 05/07/2021    Class 3 severe obesity with body mass index (BMI) of 40.0 to 44.9 in adult [facility]) 09/21/2020    Hyperglycemia 12/04/2019    Acquired hypothyroidism 12/04/2019    Pulmonary nodules 09/09/2019    Ascites due to alcoholic cirrhosis ([address] 98/70/4692    Mixed hyperlipidemia 08/08/2019    Hypertension 08/08/2019    History of alcohol abuse 08/08/2019 Gastroesophageal reflux disease 08/08/2019    Anxiety 08/08/2019    Shortness of breath 05/26/2019    History of colonic polyps 01/15/2019

## 2023-02-16 ENCOUNTER — CARE COORDINATION (OUTPATIENT)
Dept: CASE MANAGEMENT | Age: 78
End: 2023-02-16

## 2023-02-16 DIAGNOSIS — K74.60 DECOMPENSATION OF CIRRHOSIS OF LIVER (HCC): Primary | ICD-10-CM

## 2023-02-16 DIAGNOSIS — K72.90 DECOMPENSATION OF CIRRHOSIS OF LIVER (HCC): Primary | ICD-10-CM

## 2023-02-16 NOTE — CARE COORDINATION
[facility] Care Transitions Initial Follow Up Call    Call within 2 business days of discharge: Yes    Patient Current Location:  Home: [address] [address]    Care Transition Nurse contacted Patient's Wife by telephone to perform post hospital discharge assessment as Patient napping. Verified name and  as identifiers. Provided introduction to self, and explanation of the Care Transition Nurse role. Patient: Robby Gaitan Patient : 1945   MRN: [id number]  Reason for Admission: Decompensated Alcohol Cirrhosis, Hepatic Encephalopathy  Discharge Date: 2/15/23 RARS: Readmission Risk Score: 33.8  Facility: [facility] 2/10/23-2/15/23    Last Discharge  [address]       Date Complaint Diagnosis Description Type Department Provider    2/10/23 Altered Mental Status Increased ammonia level . .. ED to Hosp-Admission (Discharged) (ADMITTED) Suad Junior MD; Daniele Berger. .. Was this an external facility discharge? No   Challenges to be reviewed by the provider   Additional needs identified to be addressed with provider:     Facility: [facility] 2/10/23-2/15/23  Reason for Admission: Decompensated Alcohol Cirrhosis, Hepatic Encephalopathy    Please contact Patient for scheduling of 7 day HFU/TCM appt  Thanks! Method of communication with provider: chart routing. Wife provides the following Patient information. Patient doing well overall since home. Reports confusion resolved. No noted or Patient reported n/v/d, abd pain, confusion, sob. Taking Lactulose as directed--education provided on rx action in tx of cirrhosis, v/u. Confirmed [facility] overseeing Wife infuse IV Daptomycin. Reports she has all needed rx, supplies and denies questions citing infusion pretty straigt forward. Advised to contact [facility]  w/ any iv questions or healthcare concerns. Reports appetite, fluid intake good w/ b&b wnl. Denies resource needs.     Resource Need Assessment:   Living Arrangements: lives w/ spouse   ADLS:independent, does not drive   DME:walker, O2 per [employer] (hs), and cane   Transportation: spouse  [address], [place] for iv rx and supplies  Community Assistance:none, denies need   Referrals Made per CTN with Patient/Family Approval: n/a     Care Transition Nurse reviewed discharge instructions, medical action plan, and red flags with  Wife  who verbalized understanding. Wife  was given an opportunity to ask questions and does not have any further questions or concerns at this time. Were discharge instructions available to patient? Yes. Reviewed appropriate site of care based on symptoms and resources available to patient including: PCP  [address]. Agrees to contact PCP office for questions related to Patient healthcare. Advance Care Planning:   Does patient have an Advance Directive:  declined education . Medication reconciliation was performed with  Wife , who verbalizes understanding of administration of home medications. Medications reviewed, 1111F entered: yes. Confirmed iv Daptomycin, Lactulose, Rifaximin, Thiamine, Nadolol and all routine rx. Non-face-to-face services provided:  Obtained and reviewed discharge summary and/or continuity of care documents    Offered patient enrollment in the Remote Patient Monitoring (RPM) program for in-home monitoring:  declined .     Care Transitions 24 Hour Call    Schedule Follow Up Appointment with PCP: Jamie Carter you have a copy of your discharge instructions?: Yes  Do you have all of your prescriptions and are they filled?: Yes  Have you been contacted by a [phone] Pharmacist?: No  Have you scheduled your follow up appointment?: No  Post Acute Services: [address] (Comment: [phone])  Do you feel like you have everything you need to keep you well at home?: Yes  Care Transitions Interventions   Home Care Waiver: Completed        Transportation Support: Declined      DME Assistance: Declined     Senior Services: Declined             Discussed follow-up appointments. If no appointment was previously scheduled, appointment scheduling offered: Yes, politely declines. Stressed importance of 7 day HFU appt. Wife to provide transportation for below upcoming appt and paracentesis. Future Appointments   Date Time Provider Lucita Katherine   2/21/2023 10:30 AM [facility] IR ROOM 1 [id number] [facility] Radiolo   2/23/2023 10:15 AM Paul Liu MD [facility] ID MMA   3/7/2023 10:30 AM [facility] IR ROOM 1 [facility] SPPROC [facility] Radiolo   5/8/2023  1:30 PM Benjamin Rhodes MD [facility] Gastro MMA   5/8/2023  2:45 PM Iqra Dunn MD [facility] FPS [facility]       Care Transition Nurse provided contact information. Plan for follow-up call in 1-2 days based on severity of symptoms and risk factors.   Plan for next call: follow-up appointment-confirm 7 day HFU appt    Serenity Lindsey RN

## 2023-02-17 ENCOUNTER — TELEPHONE (OUTPATIENT)
Dept: FAMILY MEDICINE CLINIC | Age: 78
End: 2023-02-17

## 2023-02-17 NOTE — PROGRESS NOTES
IR Procedure at [facility]:  Spoke with patient's wife Nelly Fabry and he will arrive at 0900 at [facility] on 2/21/2023 for his procedure at 1030. Patient has been given below instructions. (Paracentesis, Thoracentesis, Thyroid Bx and Injections may have a light breakfast)  2. Follow your directions as prescribed by the doctor for your procedure and medications. 3.   Consult your provider as to when to stop blood thinner  4. Do not take any pain medication within 6 hours of your procedure  5. Do not drink any alcoholic beverages or use any street drugs 24 hours before procedure. 6.   Please wear simple, loose fitting clothing to the hospital.  Do not bring valuables (money,             credit cards, checkbooks, etc.)     7. If you  have a Living Will and Durable Power of  for Healthcare, please bring in a copy. 8.   Please bring picture ID,  insurance card, paperwork from the doctors office            (H & P, Consent,  & card for implantable devices). 9.   Report to the information desk on the ground floor. 10. Take a shower the night before or morning of your procedure, do not apply any lotion, oil or powder. 11. If you are going to be sedated for the procedure, you will need a responsible adult to drive you home.

## 2023-02-20 ENCOUNTER — HOSPITAL ENCOUNTER (OUTPATIENT)
Age: 78
Setting detail: SPECIMEN
Discharge: HOME | End: 2023-02-20
Payer: COMMERCIAL

## 2023-02-20 LAB
ALBUMIN SERPL-MCNC: 3 GM/DL (ref 3.4–5)
ALP BLD-CCNC: 105 IU/L (ref 40–128)
ALT SERPL-CCNC: 29 U/L (ref 10–40)
ANION GAP SERPL CALCULATED.3IONS-SCNC: 14 MMOL/L (ref 4–16)
AST SERPL-CCNC: 46 IU/L (ref 15–37)
BASOPHILS ABSOLUTE: 0.1 K/CU MM
BASOPHILS RELATIVE PERCENT: 1.3 % (ref 0–1)
BILIRUB SERPL-MCNC: 0.6 MG/DL (ref 0–1)
BUN SERPL-MCNC: 39 MG/DL (ref 6–23)
CALCIUM SERPL-MCNC: 8.7 MG/DL (ref 8.3–10.6)
CHLORIDE BLD-SCNC: 96 MMOL/L (ref 99–110)
CO2: 23 MMOL/L (ref 21–32)
CREAT SERPL-MCNC: 2.7 MG/DL (ref 0.9–1.3)
CRP SERPL HS-MCNC: 25.6 MG/L
DIFFERENTIAL TYPE: ABNORMAL
EOSINOPHILS ABSOLUTE: 0.4 K/CU MM
EOSINOPHILS RELATIVE PERCENT: 5.5 % (ref 0–3)
ERYTHROCYTE SEDIMENTATION RATE: 67 MM/HR (ref 0–20)
GFR SERPL CREATININE-BSD FRML MDRD: 24 ML/MIN/1.73M2
GLUCOSE SERPL-MCNC: 131 MG/DL (ref 70–99)
HCT VFR BLD CALC: 32.1 % (ref 42–52)
HEMOGLOBIN: 10.4 GM/DL (ref 13.5–18)
IMMATURE NEUTROPHIL %: 0.4 % (ref 0–0.43)
LYMPHOCYTES ABSOLUTE: 0.7 K/CU MM
LYMPHOCYTES RELATIVE PERCENT: 9.5 % (ref 24–44)
MCH RBC QN AUTO: 31.1 PG (ref 27–31)
MCHC RBC AUTO-ENTMCNC: 32.4 % (ref 32–36)
MCV RBC AUTO: 96.1 FL (ref 78–100)
MONOCYTES ABSOLUTE: 0.7 K/CU MM
MONOCYTES RELATIVE PERCENT: 8.9 % (ref 0–4)
NUCLEATED RBC %: 0 %
PDW BLD-RTO: 14.7 % (ref 11.7–14.9)
PLATELET # BLD: 222 K/CU MM (ref 140–440)
PMV BLD AUTO: 10.9 FL (ref 7.5–11.1)
POTASSIUM SERPL-SCNC: 4.1 MMOL/L (ref 3.5–5.1)
RBC # BLD: 3.34 M/CU MM (ref 4.6–6.2)
SEGMENTED NEUTROPHILS ABSOLUTE COUNT: 5.7 K/CU MM
SEGMENTED NEUTROPHILS RELATIVE PERCENT: 74.4 % (ref 36–66)
SODIUM BLD-SCNC: 133 MMOL/L (ref 135–145)
TOTAL CK: 100 IU/L (ref 38–174)
TOTAL IMMATURE NEUTOROPHIL: 0.03 K/CU MM
TOTAL NUCLEATED RBC: 0 K/CU MM
TOTAL PROTEIN: 6.7 GM/DL (ref 6.4–8.2)
WBC # BLD: 7.6 K/CU MM (ref 4–10.5)

## 2023-02-20 PROCEDURE — 85652 RBC SED RATE AUTOMATED: CPT

## 2023-02-20 PROCEDURE — 85025 COMPLETE CBC W/AUTO DIFF WBC: CPT

## 2023-02-20 PROCEDURE — 86140 C-REACTIVE PROTEIN: CPT

## 2023-02-20 PROCEDURE — 82550 ASSAY OF CK (CPK): CPT

## 2023-02-20 PROCEDURE — 80053 COMPREHEN METABOLIC PANEL: CPT

## 2023-02-20 RX ORDER — SODIUM CHLORIDE 0.9 % (FLUSH) 0.9 %
10 SYRINGE (ML) INJECTION PRN
OUTPATIENT
Start: 2023-02-20

## 2023-02-21 ENCOUNTER — HOSPITAL ENCOUNTER (OUTPATIENT)
Dept: INTERVENTIONAL RADIOLOGY/VASCULAR | Age: 78
Discharge: HOME | End: 2023-02-21
Payer: COMMERCIAL

## 2023-02-21 ENCOUNTER — CARE COORDINATION (OUTPATIENT)
Dept: CASE MANAGEMENT | Age: 78
End: 2023-02-21

## 2023-02-21 VITALS
TEMPERATURE: 96.9 F | RESPIRATION RATE: 18 BRPM | DIASTOLIC BLOOD PRESSURE: 63 MMHG | HEART RATE: 73 BPM | OXYGEN SATURATION: 97 % | SYSTOLIC BLOOD PRESSURE: 126 MMHG

## 2023-02-21 DIAGNOSIS — R18.8 OTHER ASCITES: ICD-10-CM

## 2023-02-21 LAB
FLUID TYPE: NORMAL INDEX
LYMPHOCYTES, BODY FLUID: 30 %
MESOTHELIAL FLUID: 10 /100 WBC
MONOCYTE, FLUID: NORMAL %
NEUTROPHIL, FLUID: 30 %
OTHER CELLS FLUID: NORMAL
RBC FLUID: NORMAL /CU MM
WBC FLUID: 121 /CU MM

## 2023-02-21 PROCEDURE — 6360000002 HC RX W HCPCS

## 2023-02-21 PROCEDURE — 2709999900 IR US GUIDED PARACENTESIS

## 2023-02-21 PROCEDURE — 7100000010 HC PHASE II RECOVERY - FIRST 15 MIN

## 2023-02-21 PROCEDURE — 89051 BODY FLUID CELL COUNT: CPT

## 2023-02-21 PROCEDURE — 6360000002 HC RX W HCPCS: Performed by: SPECIALIST

## 2023-02-21 PROCEDURE — 87070 CULTURE OTHR SPECIMN AEROBIC: CPT

## 2023-02-21 PROCEDURE — P9047 ALBUMIN (HUMAN), 25%, 50ML: HCPCS | Performed by: SPECIALIST

## 2023-02-21 PROCEDURE — 2580000003 HC RX 258

## 2023-02-21 PROCEDURE — 87205 SMEAR GRAM STAIN: CPT

## 2023-02-21 PROCEDURE — P9047 ALBUMIN (HUMAN), 25%, 50ML: HCPCS

## 2023-02-21 PROCEDURE — 49083 ABD PARACENTESIS W/IMAGING: CPT

## 2023-02-21 RX ORDER — ALBUMIN (HUMAN) 12.5 G/50ML
SOLUTION INTRAVENOUS CONTINUOUS PRN
Status: COMPLETED | OUTPATIENT
Start: 2023-02-21 | End: 2023-02-21

## 2023-02-21 RX ADMIN — ALBUMIN (HUMAN) 50 G: 0.25 INJECTION, SOLUTION INTRAVENOUS at 10:35

## 2023-02-21 ASSESSMENT — PAIN - FUNCTIONAL ASSESSMENT: PAIN_FUNCTIONAL_ASSESSMENT: 0-10

## 2023-02-21 NOTE — OR NURSING
PROCEDURE PERFORMED: Paracentesis    PRIMARY INDICATION FOR PROCEDURE: Ascities    INFORMED CONSENT:  Obtained prior to procedure. Consent placed in chart. ASSESSMENT: Pt is alert and oriented x4. Pt verbalizes understanding of procedure. BARRIER PRECAUTIONS & STERILE TECHNIQUE:               Pt remains in bed and positioned semi-griffiths for procedure. Warm blankets given. Pt placed on Vitals Signs Monitor. Pt prepped and draped in a sterile fashion with chlorhexadine. Name, , allergies, labs, code status and procedure reviewed during time out. PAIN/LOCAL ANESTHESIA/SEDATION MANAGEMENT:           Local: Lidocaine 1% given by Dr Nicol Moody at [address]:           US/FLUORO:  2 US Images          WIRE USED:           SHEATH USED:           CATHETER USED: One Step          FINAL IMAGE TAKEN TO CONFIRM PLACEMENT OF:           CONTRAST/CC:     Access removed at 1033. A total of 9270 cc of white colored fluid removed.       STERILE DRESSINGS: 4x4 gauze and tegaderm    SPECIMENS: 1020 cc of white colored fluid    EBL:  Less than 1 cc       COMPLICATIONS: none    REPORT CALLED TO: Kandis Mendoza RN in [facility]

## 2023-02-21 NOTE — CARE COORDINATION
[facility] Care Transitions Follow Up Call    Patient Current Location:  Home: [address] [place] [id number]    Care Transition Nurse contacted  Wife  by telephone to follow up after admission on 2/10/23-2/15/23. Verified name and  as identifiers. Patient: Carolynn Oliver  Patient : 1945   MRN: [id number]  Reason for Admission: Decompensated Alcohol Cirrhosis, Hepatic Encephalopathy  Discharge Date: 2/15/23 RARS: Readmission Risk Score: 33.8  Facility: [facility]    Needs to be reviewed by the provider   Additional needs identified to be addressed with provider:  no     Method of communication with provider: none. Wife provides the following information, Patient can be heard answering questions. Patient w/ Decompensated Alcohol Cirrhosis,  had planned paracentesis today w/ reported 9270ml/19# pulled off. Reports Patient w/ fatigue s/p paracentesis but otherwise fine. Denies sob, less abd distention/pain, ac distress. Reports Patient a&o. Continues to take Lactulose and is receiving iv Daptomycin infusions per Wife w/ over site of [facility]. Denies questions, issues w/ infusion. Reports stool loose 2-3 times per day. Discussed Lactulose, advised to contact Dr Anahi Posada should sx continue/worsen. Advised to schedule appt w/ Dr Irene Jackson. Has appt w/ IDD 23, Wife aware. Did not schedule 7 day HFU w/ PCP. Denies any concerns requiring MD notification. Denies resource needs. Discussed follow-up appointments. If no appointment was previously scheduled, appointment scheduling offered: Yes, politely declines.    Future Appointments   Date Time Provider Lucita Murphy   2023 10:15 AM Mariluz Phillips MD [facility] ID MMA   3/7/2023 10:30 AM [facility] IR ROOM 1 [facility] Radiolo   2023  1:30 PM Bandar aMhmood MD [facility] Gastro MMA   2023  2:45 PM Starlett Boast, MD [facility] FPS MMA     Care Transition Nurse reviewed medical action plan and red flags with  Wife  and discussed any barriers to care and/or understanding of plan of care after discharge. Discussed appropriate site of care based on symptoms and resources available to patient including: PCP  Specialist  Urgent care clinics  MyChart Messaging. Agrees to contact PCP office for questions related to healthcare. Patients top risk factors for readmission: medical condition-alc cirrhosis requiring paracentesis  Interventions to address risk factors: Education of patient/family/caregiver/guardian to support self-management-as above, confirmed paracentesis today     Care Transitions Subsequent and Final Call    Subsequent and Final Calls  Do you have any ongoing symptoms?: Yes  Onset of Patient-reported symptoms: Other  Patient-reported symptoms: Other  Interventions for patient-reported symptoms: Other  Have your medications changed?: No  Do you have any questions related to your medications?: No  Do you currently have any active services?: Yes  Are you currently active with any services?: Home Health  Do you have any needs or concerns that I can assist you with?: No  Identified Barriers: Other  Care Transitions Interventions   Home Care Waiver: Completed        Transportation Support: Declined      DME Assistance: Declined     Senior Services: Declined    Other Interventions:             Care Transition Nurse provided contact information for future needs. Plan for follow-up call in 5-7 days based on severity of symptoms and risk factors. Plan for next call: symptom management-fatigue/sob/abd distention/confusion?  self management-alcohol?--further cirrhosis education  follow-up appointment-2/23/23 ID; confirm appt scheduled w/ Dr Rene Shin  medication management-Lactulose, IV Dapto?   community resources-[facility]  referrals-RPM- declined    Amol Carlton RN

## 2023-02-21 NOTE — PROGRESS NOTES
1042- Patient returned to [facility]. Report given to this nurse from Veda Duarte. Patient is A&O able to make needs known, beverage odf choice offered to patient, call light in reach bed in lowest position. Discharge instructions given to patient and his spouse both verbalized understanding. Patient up to side of bed getting dressed assisted by spouse. 1055- Patient escorted to car via W/C spouse is transporting home.

## 2023-02-21 NOTE — DISCHARGE INSTRUCTIONS
[facility]    Patient Discharge Instructions Abdominal Fluid Drainage      You have had had an Abdominal Fluid Drainage in the Radiology Department. Below are guidelines for you to follow after your test is completed:    Go home and rest quietly for the remainder of the day. DO NOT drive, operate appliances, or make any legal decisions today. You may resume normal activities tomorrow. Have a responsible adult drive you home and remain with you for the remainder of the day. You may resume your normal diet after the procedure. Avoid alcoholic beverages and depressant drugs for 24 hours. You may resume your previous medications unless directed not to by your physician or the Radiologist. Estephaniaoliva Harmon may use Tylenol (one or two tablets every four to six hours) for discomfort at the puncture site. If you develop severe pain, swelling, or redness at the site, call the Radiology Department. Call your physician immediately if you develop a rapid pulse (greater than 100 beats per minute), feel faint, sweat profusely, experience a sudden onset of weakness, or experience increased pain or swelling or saturation of dressing at the puncture site. Call your physician immediately if you develop a sudden onset of rapid breathing (greater than 20 breaths per minute), upper back or chest pain, shortness of breath, sweating, skin color change, or a sudden onset of anxiety. Report a temperature greater than 101 degrees Fahrenheit (38.8 degrees Celsius) to us or to your physician. Check the dressing throughout the day for an increase in drainage. Keep the dressing dry for 24 hours. Replace with a Band-Aid if necessary. You may shower 24 hours after the procedure. Do not smoke for 24 hours after the procedure. Call your physician for a follow-up appointment.   If any questions or complications develop after you go home, please call the Radiology Department at [phone]

## 2023-02-23 ENCOUNTER — OFFICE VISIT (OUTPATIENT)
Dept: INFECTIOUS DISEASES | Age: 78
End: 2023-02-23

## 2023-02-23 VITALS
BODY MASS INDEX: 33.54 KG/M2 | HEART RATE: 83 BPM | DIASTOLIC BLOOD PRESSURE: 49 MMHG | TEMPERATURE: 98.1 F | SYSTOLIC BLOOD PRESSURE: 124 MMHG | WEIGHT: 220.6 LBS | RESPIRATION RATE: 18 BRPM

## 2023-02-23 DIAGNOSIS — B95.7 BACTEREMIA DUE TO METHICILLIN RESISTANT STAPHYLOCOCCUS EPIDERMIDIS: Primary | ICD-10-CM

## 2023-02-23 DIAGNOSIS — I33.0 ACUTE BACTERIAL ENDOCARDITIS: ICD-10-CM

## 2023-02-23 DIAGNOSIS — Z16.29 BACTEREMIA DUE TO METHICILLIN RESISTANT STAPHYLOCOCCUS EPIDERMIDIS: Primary | ICD-10-CM

## 2023-02-23 DIAGNOSIS — R78.81 BACTEREMIA DUE TO METHICILLIN RESISTANT STAPHYLOCOCCUS EPIDERMIDIS: Primary | ICD-10-CM

## 2023-02-23 DIAGNOSIS — K70.31 ALCOHOLIC CIRRHOSIS OF LIVER WITH ASCITES (HCC): ICD-10-CM

## 2023-02-23 NOTE — PROGRESS NOTES
2/23/2023         Referring Physician: No ref. provider found  Primary Care Physician: Jf Beltran MD    Impression/Plan:   Diagnosis Orders   1. Bacteremia due to methicillin resistant Staphylococcus epidermidis        2. Alcoholic cirrhosis of liver with ascites ([facility] [address]        3. Acute bacterial endocarditis            Discussion:  Acute bacterial endocarditis  Labs reviewed. CRP slightly elevated and has no clear trend. Continue intravenous daptomycin. Return in about 2 weeks (around 3/9/2023). 35 minutes was spent in the encounter; more than 50% of the face-to-face time was spent with the patient providing counseling and coordination of care. History: Josue Krishnan is a 78 y.o.  male presenting today for follow-up. Medical history of alcoholic liver cirrhosis, portal hypertension, hypertension hypothyroidism, morbid obesity who has been seen thrice in the past by the infectious disease service. 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 He was discharged to complete a 6-week course of intravenous daptomycin on 3/13/2023. Tolerating abx. Denies fatigue, fever, chills, night sweats and weight loss.        Review of Systems    Allergies   Allergen Reactions    Lisinopril Swelling     Tongue swelling      Zetia [Ezetimibe] Swelling     Facial swelling    Atorvastatin     Codeine Other (See Comments)     Blood pressure drops    Hydrochlorothiazide     Hydroxyzine      Fatigue and depressed    Lexapro [Escitalopram Oxalate]      Increased depression    Pravastatin        Patient Active Problem List   Diagnosis    Shortness of breath    History of colonic polyps    Ascites due to alcoholic cirrhosis (HCC)    Mixed hyperlipidemia    Hypertension    History of alcohol abuse    Gastroesophageal reflux disease    Anxiety    Pulmonary nodules    Hyperglycemia    Acquired hypothyroidism    Class 3 severe obesity with body mass index (BMI) of 40.0 to 44.9 in adult (HCC)    Bilateral hearing loss    Increased ammonia level    Portal hypertension (HCC)    SOB (shortness of breath)    Decompensation of cirrhosis of liver (HCC)    Secondary esophageal varices without bleeding (HCC)    Gastric polyps    Hearing loss    Septicemia (HCC)    Acute metabolic encephalopathy    Recurrent right pleural effusion    Acute respiratory failure (HCC)    Acute kidney injury superimposed on chronic kidney disease (HCC)    Generalized weakness    Oropharyngeal dysphagia    Cellulitis of left lower extremity    Type 2 diabetes mellitus without complication, without long-term current use of insulin (HCC)    Thyroid disease    Type 2 diabetes mellitus with chronic kidney disease    Ex-smoker    Obesity (BMI 30-39.9)    Recurrent pleural effusion on right    Pleural effusion    Hypoxia    AMS (altered mental status)    Sleep related hypoxia    Stage 3 chronic kidney disease (HCC)    Bacteremia due to methicillin resistant Staphylococcus epidermidis    Alcoholic cirrhosis of liver with ascites (HCC)    Staphylococcus epidermidis bacteremia    Chronic heart failure, unspecified heart failure type (HCC)    Portal systemic encephalopathy    Acute bacterial endocarditis       Current Outpatient Medications   Medication Sig Dispense Refill    DAPTOmycin (CUBICIN) infusion Infuse 804 mg intravenously every 24 hours for 25 days IV daptomycin 8 mg/kg q24h with end date 3/13/2023 (as CrCl is 34, above 30, if decreases below 30 will change to q48h) 21 g 0    lactulose (CHRONULAC) 10 GM/15ML solution Take 30 mLs by mouth 3 times daily Hold if having diarrhea 2700 mL 5    spironolactone (ALDACTONE) 100 MG tablet Take 1 tablet by mouth 2 times daily 30 tablet 2    rifAXIMin (XIFAXAN) 550 MG tablet Take 1 tablet by mouth 2 times daily 42 tablet 0    thiamine 100 MG tablet Take 1 tablet by mouth daily (Patient taking differently: Take 100 mg by mouth daily  OTC) 30 tablet 3    furosemide (LASIX) 40 MG tablet Take 1 tablet by mouth daily 60 tablet 3    levothyroxine (SYNTHROID) 50 MCG tablet Take 1 tablet by mouth Daily 90 tablet 1    albuterol sulfate HFA (PROVENTIL;VENTOLIN;PROAIR) 108 (90 Base) MCG/ACT inhaler INHALE 2 PUFFS BY MOUTH 4 TIMES DAILY AS NEEDED FOR WHEEZING 1 each 2    DULoxetine (CYMBALTA) 60 MG extended release capsule TAKE 1 CAPSULE BY MOUTH ONCE DAILY (Patient taking differently: Take 60 mg by mouth daily TAKE 1 CAPSULE BY MOUTH ONCE DAILY) 90 capsule 1    betamethasone valerate (VALISONE) 0.1 % lotion Apply topically 2 times daily prn scalp rash 60 mL 3    Multiple Vitamin (MULTI VITAMIN MENS PO) Take 1 tablet by mouth daily      ESOMEPRAZOLE MAGNESIUM PO Take 20 mg by mouth daily OTC      Omega-3 1000 MG CAPS Take 2,000 mg by mouth daily      nadolol (CORGARD) 40 MG tablet Take 1 tablet by mouth in the morning. (Patient taking differently: Take 40 mg by mouth nightly) 90 tablet 3     No current facility-administered medications for this visit. Past Medical History:   Diagnosis Date    Anxiety     Cirrhosis, alcoholic ([facility] [address]     Diabetes mellitus ([facility] [address]     GERD (gastroesophageal reflux disease) 2000    GERD (gastroesophageal reflux disease)     Hyperlipidemia     Hypertension     Portal hypertension (HCC)     Pulmonary nodules     Sleep apnea     SOB (shortness of breath)     Thyroid disease        Past Surgical History:   Procedure Laterality Date    APPENDECTOMY      CHOLECYSTECTOMY      COLONOSCOPY      ENDOSCOPY, COLON, DIAGNOSTIC      FOOT SURGERY      needle removed,not sure which foot, per wife.     TONSILLECTOMY      UPPER GASTROINTESTINAL ENDOSCOPY N/A 1/19/2022    EGD BIOPSY performed by Isai Otto MD at [address] [place] [address] History    Marital status:      Spouse name: Not on file    Number of children: Not on file    Years of education: Not on file    Highest education level: Not on file   Occupational History     Comment: Retired    Tobacco Use    Smoking status: Former     Packs/day: 1.00     Years: 14.00     Pack years: 14.00     Types: Cigarettes     Quit date:      Years since quittin.1    Smokeless tobacco: Never   Vaping Use    Vaping Use: Never used   Substance and Sexual Activity    Alcohol use: Not Currently    Drug use: Never    Sexual activity: Yes     Partners: Female   Other Topics Concern    Not on file   Social History Narrative    Not on file     Social Determinants of Health     Financial Resource Strain: Low Risk     Difficulty of Paying Living Expenses: Not hard at all   Food Insecurity: No Food Insecurity    Worried About Running Out of Food in the Last Year: Never true    Ran Out of Food in the Last Year: Never true   Transportation Needs: Not on file   Physical Activity: Not on file   Stress: Not on file   Social Connections: Not on file   Intimate Partner Violence: Not on file   Housing Stability: Not on file       Family History   Problem Relation Age of Onset    Hypertension Brother     Diabetes Other        Vital Signs:  Vitals:    23 1032   BP: (!) 124/49   Site: Left Upper Arm   Position: Sitting   Cuff Size: Medium Adult   Pulse: 83   Resp: 18   Temp: 98.1 °F (36.7 °C)   TempSrc: Infrared   Weight: 220 lb 9.6 oz (100.1 kg)        Wt Readings from Last 3 Encounters:   23 220 lb 9.6 oz (100.1 kg)   02/15/23 221 lb 8 oz (100.5 kg)   23 218 lb (98.9 kg)        Physical Exam:   Gen: alert and NAD  HEENT: sclera clear, pupils equal and reactive, extra ocular muscles intact, oropharynx clear, mucus membranes moist, tympanic membranes clear bilaterally, no cervical lymphadenopathy noted, and neck supple  Neck: supple, no significant adenopathy  Chest: clear to auscultation, no wheezes, rales or rhonchi, symmetric air entry  Heart: regular rate and rhythm, no murmurs  ABD: ascites  EXT:peripheral pulses normal, no pedal edema, no clubbing or cyanosis, right arm PICC line  NEURO: alert, oriented, normal speech, no focal findings or movement disorder noted, hearing impaired  Skin: well hydrated, no lesions, surgical site examined  Wounds: none  Labs:   WBC   Date Value Ref Range Status   02/20/2023 7.6 4.0 - 10.5 K/CU MM Final   02/11/2023 7.9 4.0 - 10.5 K/CU MM Final   02/10/2023 8.2 4.0 - 10.5 K/CU MM Final     Creatinine   Date Value Ref Range Status   02/20/2023 2.7 (H) 0.9 - 1.3 MG/DL Final   02/15/2023 1.8 (H) 0.9 - 1.3 MG/DL Final   02/14/2023 1.9 (H) 0.9 - 1.3 MG/DL Final       Cultures:  Culture   Date Value Ref Range Status   02/21/2023   Preliminary    Prelim Report No growth to date No growth 36 to 48 hours   02/12/2023 Final Report No growth at 18 to 36 hours  Final   02/10/2023 Final Report No growth 60 to 72 hours  Final       Imaging Studies:   none      Electronicallysigned by Sp Landa MD on 2/22/23 at 9:03 PM EST

## 2023-02-24 LAB
CULTURE: NORMAL
Lab: NORMAL
SPECIMEN: NORMAL

## 2023-02-27 ENCOUNTER — CARE COORDINATION (OUTPATIENT)
Dept: CASE MANAGEMENT | Age: 78
End: 2023-02-27

## 2023-02-27 ENCOUNTER — HOSPITAL ENCOUNTER (OUTPATIENT)
Age: 78
Setting detail: SPECIMEN
Discharge: HOME | End: 2023-02-27
Payer: COMMERCIAL

## 2023-02-27 LAB
ALBUMIN SERPL-MCNC: 3.1 GM/DL (ref 3.4–5)
ALP BLD-CCNC: 110 IU/L (ref 40–129)
ALT SERPL-CCNC: 33 U/L (ref 10–40)
ANION GAP SERPL CALCULATED.3IONS-SCNC: 11 MMOL/L (ref 4–16)
AST SERPL-CCNC: 67 IU/L (ref 15–37)
BASOPHILS ABSOLUTE: 0.1 K/CU MM
BASOPHILS RELATIVE PERCENT: 1.1 % (ref 0–1)
BILIRUB SERPL-MCNC: 0.5 MG/DL (ref 0–1)
BUN SERPL-MCNC: 28 MG/DL (ref 6–23)
CALCIUM SERPL-MCNC: 9.2 MG/DL (ref 8.3–10.6)
CHLORIDE BLD-SCNC: 98 MMOL/L (ref 99–110)
CO2: 22 MMOL/L (ref 21–32)
CREAT SERPL-MCNC: 1.8 MG/DL (ref 0.9–1.3)
CRP SERPL HS-MCNC: 32.6 MG/L
DIFFERENTIAL TYPE: ABNORMAL
EOSINOPHILS ABSOLUTE: 0.5 K/CU MM
EOSINOPHILS RELATIVE PERCENT: 9.2 % (ref 0–3)
ERYTHROCYTE SEDIMENTATION RATE: 75 MM/HR (ref 0–20)
GFR SERPL CREATININE-BSD FRML MDRD: 39 ML/MIN/1.73M2
GLUCOSE SERPL-MCNC: 110 MG/DL (ref 70–99)
HCT VFR BLD CALC: 33.1 % (ref 42–52)
HEMOGLOBIN: 10.6 GM/DL (ref 13.5–18)
IMMATURE NEUTROPHIL %: 0.4 % (ref 0–0.43)
LYMPHOCYTES ABSOLUTE: 0.5 K/CU MM
LYMPHOCYTES RELATIVE PERCENT: 9.5 % (ref 24–44)
MCH RBC QN AUTO: 31.4 PG (ref 27–31)
MCHC RBC AUTO-ENTMCNC: 32 % (ref 32–36)
MCV RBC AUTO: 97.9 FL (ref 78–100)
MONOCYTES ABSOLUTE: 0.5 K/CU MM
MONOCYTES RELATIVE PERCENT: 9.2 % (ref 0–4)
NUCLEATED RBC %: 0 %
PDW BLD-RTO: 15 % (ref 11.7–14.9)
PLATELET # BLD: 125 K/CU MM (ref 140–440)
PMV BLD AUTO: 10 FL (ref 7.5–11.1)
POTASSIUM SERPL-SCNC: 4.4 MMOL/L (ref 3.5–5.1)
RBC # BLD: 3.38 M/CU MM (ref 4.6–6.2)
SEGMENTED NEUTROPHILS ABSOLUTE COUNT: 3.9 K/CU MM
SEGMENTED NEUTROPHILS RELATIVE PERCENT: 70.6 % (ref 36–66)
SODIUM BLD-SCNC: 131 MMOL/L (ref 135–145)
TOTAL CK: 122 IU/L (ref 38–174)
TOTAL IMMATURE NEUTOROPHIL: 0.02 K/CU MM
TOTAL NUCLEATED RBC: 0 K/CU MM
TOTAL PROTEIN: 6.8 GM/DL (ref 6.4–8.2)
WBC # BLD: 5.5 K/CU MM (ref 4–10.5)

## 2023-02-27 PROCEDURE — 85025 COMPLETE CBC W/AUTO DIFF WBC: CPT

## 2023-02-27 PROCEDURE — 85652 RBC SED RATE AUTOMATED: CPT

## 2023-02-27 PROCEDURE — 86140 C-REACTIVE PROTEIN: CPT

## 2023-02-27 PROCEDURE — 82550 ASSAY OF CK (CPK): CPT

## 2023-02-27 PROCEDURE — 80053 COMPREHEN METABOLIC PANEL: CPT

## 2023-02-27 NOTE — CARE COORDINATION
[facility] Care Transitions Follow Up Call    Patient Current Location:  Home: [address] [place] [id number]    Care Transition Nurse contacted the patient by telephone to follow up after admission on 2/10/23-2/15/23.  Verified name and  with patient as identifiers.    Patient: Boby Glasgow  Patient : 1945   MRN: [id number]  Reason for Admission: Decompensated Alcohol Cirrhosis, Hepatic Encephalopathy  Discharge Date: 2/15/23 RARS: Readmission Risk Score: 33.8  Facility: [facility]    Needs to be reviewed by the provider   Additional needs identified to be addressed with provider:     Facility: [facility] 2/10/23-2/15/23  Reason for Admission: Decompensated Alcohol Cirrhosis, Hepatic Encephalopathy    23 Paracentesis w/ 9270cc removed. Patient reports once again w/ abdominal bloating/distention and mild sob. Next paracentesis scheduled for 3/7/23.  Please further advise Patient.        Method of communication with provider: chart routing as office closed.     Patient reports once again w/ abdominal bloating/distention and mild sob. 23 had paracentesis w/ 9270cc removed. Next paracentesis scheduled for 3/7/23. Noted to be speaking in complete, unlabored sentences. CTN to notify PCP of sx (as above). Was seen by ID 23 w/ instruction to continue IV Daptomycin. Patient reports Wife continues to infuse IV Daptomycin as directed w/ over site of [facility]. No questions, concerns, resource needs. Advised to avoid nsaids, no alcohol.     23 Dr Mueller--attended appt  Future Appointments   Date Time Provider Department Center   3/7/2023 10:30 AM [facility] IR ROOM 1 [facility]   3/8/2023  9:00 AM Sravan Baeza MD [facility] ID [facility]   2023  1:30 PM Travis Phelan MD [facility] Gastro [facility]   2023  2:45 PM Paul Baeza MD [facility] FPS [facility]     Care Transition Nurse reviewed medical action plan and red flags with patient and discussed any barriers to care and/or understanding of plan of  care after discharge. Discussed appropriate site of care based on symptoms and resources available to patient including: PCP  Specialist  Urgent care clinics  Home health  [employer]. The patient agrees to contact PCP office for questions related to  healthcare. Patients top risk factors for readmission: medical condition-Decomp Alcohol Cirrohsis w/ redevelopment of abd distention/bloating  Interventions to address risk factors:  notified PCP for further advisement    Offered patient enrollment in the Remote Patient Monitoring (RPM) program for in-home monitoring: Patient declined. Care Transitions Subsequent and Final Call    Schedule Follow Up Appointment with PCP: Declined  Subsequent and Final Calls  Do you have any ongoing symptoms?: Yes  Onset of Patient-reported symptoms: In the past 7 days  Patient-reported symptoms: Other  Interventions for patient-reported symptoms: Notified PCP/Physician  Have your medications changed?: No  Do you have any questions related to your medications?: No  Do you currently have any active services?: Yes  Are you currently active with any services?: Home Health  Do you have any needs or concerns that I can assist you with?: No  Identified Barriers: Other  Care Transitions Interventions   Home Care Waiver: Completed        Transportation Support: Declined      DME Assistance: Declined     Senior Services: Declined    Other Interventions:             Care Transition Nurse provided contact information for future needs. Plan for follow-up call in 1-2 days based on severity of symptoms and risk factors. Plan for next call:  f/u on Dr Monica Cotto response re: re-development of abd distention , cold sx?     Ayo Ruiz RN

## 2023-02-28 ENCOUNTER — TELEPHONE (OUTPATIENT)
Dept: INFECTIOUS DISEASES | Age: 78
End: 2023-02-28

## 2023-02-28 ENCOUNTER — TELEPHONE (OUTPATIENT)
Dept: FAMILY MEDICINE CLINIC | Age: 78
End: 2023-02-28

## 2023-02-28 ENCOUNTER — TELEPHONE (OUTPATIENT)
Dept: GASTROENTEROLOGY | Age: 78
End: 2023-02-28

## 2023-02-28 NOTE — TELEPHONE ENCOUNTER
Pt's wife, Luna Alan called and states that pt has developed a rash and is feeling itchy. Pt is receiving daptomycin until 3/13/2023. Please advise.

## 2023-02-28 NOTE — PROGRESS NOTES
IR Procedure at [facility]:  Spoke with patient's wife Omari Rodriguez and patient will arrive at 0900 at [facility] on 3/7/2023 for his procedure at 1030. Patient's wife stated\" that she is concerned , because he is filling up pretty fast, so I told her to call the Dr to let them know and maybe they could get him in sooner. (Paracentesis, Thoracentesis, Thyroid Bx and Injections may have a light breakfast)  2. Follow your directions as prescribed by the doctor for your procedure and medications. 3.   Consult your provider as to when to stop blood thinner  4. Do not take any pain medication within 6 hours of your procedure  5. Do not drink any alcoholic beverages or use any street drugs 24 hours before procedure. 6.   Please wear simple, loose fitting clothing to the hospital.  Do not bring valuables (money,             credit cards, checkbooks, etc.)     7. If you  have a Living Will and Durable Power of  for Healthcare, please bring in a copy. 8.   Please bring picture ID,  insurance card, paperwork from the doctors office            (H & P, Consent,  & card for implantable devices). 9.   Report to the information desk on the ground floor. 10. Take a shower the night before or morning of your procedure, do not apply any lotion, oil or powder. 11. If you are going to be sedated for the procedure, you will need a responsible adult to drive you home.

## 2023-02-28 NOTE — TELEPHONE ENCOUNTER
Aspen from [id number] [facility] called to follow up on patients rash. Once I looked into the chart,the issue was directed with Dr Jolanta Sarabia. Infectious disease.   Aspen will contact that office to check the status

## 2023-02-28 NOTE — TELEPHONE ENCOUNTER
Spoke with spouse. She has a call into Dr Alease Lesch about doing another paracentisis sooner but has not heard any response yet. Doesn't feel coming in here would be of any benefit of coming in here for an eval.  She knows Dr Alease Lesch would be the one to order the procedure and that's who she wants to talk with.

## 2023-02-28 NOTE — TELEPHONE ENCOUNTER
Spoke to pt about rescheduling and wife asked if his paracentesis can be scheduled sooner than March 7.   His belly is filling up really fast.  Please advise

## 2023-03-01 ENCOUNTER — TELEPHONE (OUTPATIENT)
Dept: GASTROENTEROLOGY | Age: 78
End: 2023-03-01

## 2023-03-01 DIAGNOSIS — R18.8 OTHER ASCITES: Primary | ICD-10-CM

## 2023-03-01 DIAGNOSIS — Z79.2 RECEIVING INTRAVENOUS ANTIBIOTIC TREATMENT AT HOME: Primary | ICD-10-CM

## 2023-03-01 RX ORDER — LINEZOLID 600 MG/1
600 TABLET, FILM COATED ORAL 2 TIMES DAILY
Qty: 24 TABLET | Refills: 0 | Status: SHIPPED | OUTPATIENT
Start: 2023-03-01 | End: 2023-03-13

## 2023-03-01 NOTE — TELEPHONE ENCOUNTER
Pt. ALEXIS stating he has been having cramps in his feet, hands,and calves and a rash on the side of his chest. he thought it could be due to a new medication he was taking but I am unaware of what medication. I attempted to contact pt to gather more information but I was not able to make contact or leave a message to have pt call me back.

## 2023-03-01 NOTE — TELEPHONE ENCOUNTER
I reviewed the past inpatient notes. He was initially transitioned from IV vancomycin, due to CANDIE, to daptomycin. I do not want to start IV vancomycin in the outpatient setting due to risk of CANDIE. Please DC the IV daptomycin. I will prescribe po linezolid 600 mg bid to complete the 6 week course (end date 3/13/2023). Continue weekly labs (without the CK). He also needs to follow up with cardiology for resolution of endocarditis.

## 2023-03-02 ENCOUNTER — OFFICE VISIT (OUTPATIENT)
Dept: FAMILY MEDICINE CLINIC | Age: 78
End: 2023-03-02
Payer: COMMERCIAL

## 2023-03-02 ENCOUNTER — CARE COORDINATION (OUTPATIENT)
Dept: CASE MANAGEMENT | Age: 78
End: 2023-03-02

## 2023-03-02 VITALS
SYSTOLIC BLOOD PRESSURE: 122 MMHG | HEART RATE: 94 BPM | HEIGHT: 68 IN | DIASTOLIC BLOOD PRESSURE: 66 MMHG | OXYGEN SATURATION: 97 % | WEIGHT: 228 LBS | BODY MASS INDEX: 34.56 KG/M2

## 2023-03-02 DIAGNOSIS — K74.60 DECOMPENSATION OF CIRRHOSIS OF LIVER (HCC): ICD-10-CM

## 2023-03-02 DIAGNOSIS — L30.9 DERMATITIS: ICD-10-CM

## 2023-03-02 DIAGNOSIS — K72.90 DECOMPENSATION OF CIRRHOSIS OF LIVER (HCC): ICD-10-CM

## 2023-03-02 DIAGNOSIS — R18.8 OTHER ASCITES: Primary | ICD-10-CM

## 2023-03-02 DIAGNOSIS — I33.0 ACUTE BACTERIAL ENDOCARDITIS: Primary | ICD-10-CM

## 2023-03-02 PROCEDURE — 3074F SYST BP LT 130 MM HG: CPT

## 2023-03-02 PROCEDURE — 3078F DIAST BP <80 MM HG: CPT

## 2023-03-02 PROCEDURE — 99215 OFFICE O/P EST HI 40 MIN: CPT

## 2023-03-02 PROCEDURE — 1124F ACP DISCUSS-NO DSCNMKR DOCD: CPT

## 2023-03-02 RX ORDER — ALBUTEROL SULFATE 90 UG/1
AEROSOL, METERED RESPIRATORY (INHALATION)
Qty: 1 EACH | Refills: 2 | Status: SHIPPED | OUTPATIENT
Start: 2023-03-02

## 2023-03-02 ASSESSMENT — ENCOUNTER SYMPTOMS
SHORTNESS OF BREATH: 0
COLOR CHANGE: 0
DIARRHEA: 0
CONSTIPATION: 0
NAUSEA: 0
VOMITING: 0
BLOOD IN STOOL: 0
ABDOMINAL PAIN: 0

## 2023-03-02 NOTE — CARE COORDINATION
[address] Care Transitions Follow Up Call    Patient Current Location:  Home: [place] [id number]    Care Transition Nurse contacted the family by telephone to follow up after admission on 2/10/23. Verified name and  with family as identifiers. Patient: Domonique Guerra  Patient : 1945   MRN: [id number]  Reason for Admission: AMS bacteremia  Discharge Date: 2/15/23 RARS: Readmission Risk Score: 33.8      Needs to be reviewed by the provider   Additional needs identified to be addressed with provider: No  none             Method of communication with provider: none. Conversation:  Spoke with Tanja Junior after 2 IDs. She stated that Fay Reyes has trouble hearing on the phone. Stated pt needs a paracentesis and the MD has not called her back. Will route message to Dr Milena Silver. When mentioed upcoming appt with ID MD. She expressed that she is not happy with him and became upset. She stated she was upset and needed to stop the conversation.   Notified Janneth Vasques for next call: Rick Stewart to call pt        FELICIA Marie, RN   [phone]  [facility] Transition Nurse  [phone]

## 2023-03-02 NOTE — PROGRESS NOTES
3/2/2023    [facility]    Chief Complaint   Patient presents with    Other     - wife would like to discuss having main ine removed right arm  - pt has paracentesis every 2 weeks to drain fluid via Dr Bernardino Mohs. Wife believes this isn't often enough d/t abdominal distention and has difficulty getting Dr Leora Angel office to return her calls. - daptomycin dc'd d/t allergic reaction, pt is start linezolid in it's place wife concerned this med interacts with duloxetine. HPI  History was obtained from patient. Saumya Rm is a pleasant 78 y.o. male who presents today for follow up. Following with Infectious disease for blood infection and infective endocarditis. Wife is concerned about getting his RUE PICC line out. Wife concerned about interactions between Cymbalta and Linezolid- she has stopped giving him the Cymbalta while he is taking Linezolid. Rash has improved since stopping Daptomycin. Muscle cramping has resolved completely. They are doing every two weeks scheduled paracentesis- last paracentesis was 9.27L. Wife feels that this frequency is not enough as patient continues to wheeze and the ascites returns very quickly. They have a follow up appointment in [facility]- patient is not a good candidate for transplant but may be a candidate for at home paracentesis. 1. Acute bacterial endocarditis    2. Decompensation of cirrhosis of liver ([facility] [address]    3. Dermatitis             REVIEW OF SYMPTOMS    Review of Systems   Constitutional:  Negative for chills, fever and unexpected weight change. Respiratory:  Negative for shortness of breath. Cardiovascular: Negative. Negative for chest pain, palpitations and leg swelling. Gastrointestinal:  Negative for abdominal pain, blood in stool, constipation, diarrhea, nausea and vomiting. Genitourinary:  Negative for difficulty urinating. Skin:  Negative for color change. Neurological:  Negative for dizziness and light-headedness.      PAST MEDICAL HISTORY  Past Medical History:   Diagnosis Date    Anxiety     Cirrhosis, alcoholic ([facility] [address]     Diabetes mellitus ([facility] [address]     GERD (gastroesophageal reflux disease)     GERD (gastroesophageal reflux disease)     Hyperlipidemia     Hypertension     Portal hypertension (HCC)     Pulmonary nodules     Sleep apnea     SOB (shortness of breath)     Thyroid disease        FAMILY HISTORY  Family History   Problem Relation Age of Onset    Hypertension Brother     Diabetes Other        SOCIAL HISTORY  Social History     Socioeconomic History    Marital status:      Spouse name: None    Number of children: None    Years of education: None    Highest education level: None   Occupational History     Comment: Retired    Tobacco Use    Smoking status: Former     Packs/day: 1.00     Years: 14.00     Pack years: 14.00     Types: Cigarettes     Quit date:      Years since quittin.1    Smokeless tobacco: Never   Vaping Use    Vaping Use: Never used   Substance and Sexual Activity    Alcohol use: Not Currently    Drug use: Never    Sexual activity: Yes     Partners: Female     Social Determinants of Health     Financial Resource Strain: Low Risk     Difficulty of Paying Living Expenses: Not hard at all   Food Insecurity: No Food Insecurity    Worried About Running Out of Food in the Last Year: Never true    Ran Out of Food in the Last Year: Never true        SURGICAL HISTORY  Past Surgical History:   Procedure Laterality Date    APPENDECTOMY      CHOLECYSTECTOMY      COLONOSCOPY      ENDOSCOPY, COLON, DIAGNOSTIC      FOOT SURGERY      needle removed,not sure which foot, per wife.     TONSILLECTOMY      UPPER GASTROINTESTINAL ENDOSCOPY N/A 2022    EGD BIOPSY performed by Zara Krueger MD at [address]  Current Outpatient Medications   Medication Sig Dispense Refill    albuterol sulfate HFA (PROVENTIL;VENTOLIN;PROAIR) 108 (90 Base) MCG/ACT inhaler INHALE 2 PUFFS BY MOUTH 4 TIMES DAILY AS NEEDED FOR WHEEZING 1 each 2    triamcinolone (KENALOG) 0.1 % ointment Apply topically 2 times daily for 7 days 30 g 1    linezolid (ZYVOX) 600 MG tablet Take 1 tablet by mouth 2 times daily for 12 days 24 tablet 0    lactulose (CHRONULAC) 10 GM/15ML solution Take 30 mLs by mouth 3 times daily Hold if having diarrhea 2700 mL 5    spironolactone (ALDACTONE) 100 MG tablet Take 1 tablet by mouth 2 times daily 30 tablet 2    rifAXIMin (XIFAXAN) 550 MG tablet Take 1 tablet by mouth 2 times daily 42 tablet 0    thiamine 100 MG tablet Take 1 tablet by mouth daily (Patient taking differently: Take 100 mg by mouth daily OTC) 30 tablet 3    furosemide (LASIX) 40 MG tablet Take 1 tablet by mouth daily 60 tablet 3    levothyroxine (SYNTHROID) 50 MCG tablet Take 1 tablet by mouth Daily 90 tablet 1    DULoxetine (CYMBALTA) 60 MG extended release capsule TAKE 1 CAPSULE BY MOUTH ONCE DAILY (Patient taking differently: Take 60 mg by mouth daily TAKE 1 CAPSULE BY MOUTH ONCE DAILY) 90 capsule 1    betamethasone valerate (VALISONE) 0.1 % lotion Apply topically 2 times daily prn scalp rash 60 mL 3    nadolol (CORGARD) 40 MG tablet Take 1 tablet by mouth in the morning. (Patient taking differently: Take 40 mg by mouth nightly) 90 tablet 3    Multiple Vitamin (MULTI VITAMIN MENS PO) Take 1 tablet by mouth daily      ESOMEPRAZOLE MAGNESIUM PO Take 20 mg by mouth daily OTC      Omega-3 1000 MG CAPS Take 2,000 mg by mouth daily       No current facility-administered medications for this visit.        ALLERGIES  Allergies   Allergen Reactions    Lisinopril Swelling     Tongue swelling      Zetia [Ezetimibe] Swelling     Facial swelling    Atorvastatin     Codeine Other (See Comments)     Blood pressure drops    Hydrochlorothiazide     Hydroxyzine      Fatigue and depressed    Lexapro [Escitalopram Oxalate]      Increased depression    Pravastatin     Daptomycin Rash and Other (See Comments) Hands drawn involuntarily       PHYSICAL EXAM    /66 (Site: Left Upper Arm, Position: Sitting, Cuff Size: Small Adult)   Pulse 94   Ht 5' 8\" (1.727 m)   Wt 228 lb (103.4 kg)   SpO2 97%   BMI 34.67 kg/m²     Physical Exam  Vitals and nursing note reviewed. Constitutional:       General: He is not in acute distress. Appearance: Normal appearance. He is well-developed. HENT:      Head: Normocephalic and atraumatic. Eyes:      General: No scleral icterus. Conjunctiva/sclera: Conjunctivae normal.   Neck:      Thyroid: No thyromegaly. Vascular: No JVD. Trachea: No tracheal deviation. Cardiovascular:      Rate and Rhythm: Normal rate and regular rhythm. Heart sounds: Normal heart sounds. Pulmonary:      Effort: Pulmonary effort is normal. No respiratory distress. Breath sounds: Wheezing present. No rales. Abdominal:      General: Bowel sounds are normal. There is distension. Palpations: Abdomen is soft. Tenderness: There is abdominal tenderness. There is no guarding or rebound. Musculoskeletal:      Cervical back: Neck supple. Lymphadenopathy:      Cervical: No cervical adenopathy. Skin:     General: Skin is warm and dry. Findings: Erythema and rash present. Rash is macular and papular. Nails: There is no clubbing. Comments: Scatted macular/papular rash- various stages of healing. No signs of infection. Neurological:      Mental Status: He is alert. Mental status is at baseline. Comments: Nonfocal   Psychiatric:         Behavior: Behavior normal.         Judgment: Judgment normal.       ASSESSMENT & PLAN    Meghna Melissa was seen today for other. Diagnoses and all orders for this visit:    Acute bacterial endocarditis  Appointment made with cardiology next week for follow up. Instructions including address provided to patient and his wife.    -     Ambulatory referral to Cardiology    Decompensation of cirrhosis of liver [facility])  Patient requesting inhaler, states that this is helpful for him. -     albuterol sulfate HFA (PROVENTIL;VENTOLIN;PROAIR) 108 (90 Base) MCG/ACT inhaler; INHALE 2 PUFFS BY MOUTH 4 TIMES DAILY AS NEEDED FOR WHEEZING    Dermatitis  Rash has improved but not resolved. -     triamcinolone (KENALOG) 0.1 % ointment; Apply topically 2 times daily for 7 days    Active listening and reassurance provided. Secure message to ID NP who advised that an active order for removal of the PICC line was in place with IR. Wife has been obviously stressed and has not been answering her phone- so IR may have been attmepting to call and get him scheduled. Contact information for IR also provided to patient's wife. All questions and concerns addressed today- encouraged wife to reach back out with any additional concerns. Encouraged wife to discuss concerns of scheduled paracentesis with  Dr. Julianne Go. Return if symptoms worsen or fail to improve. On this date 3/2/23 I have spent 40 minutes reviewing previous notes, test results and face to face with the patient discussing the diagnosis and importance of compliance with the treatment plan as well as documenting on the day of the visit.     Electronically signed by MOHINDER Holland CNP on 3/2/2023

## 2023-03-03 ENCOUNTER — CARE COORDINATION (OUTPATIENT)
Dept: CASE MANAGEMENT | Age: 78
End: 2023-03-03

## 2023-03-03 NOTE — CARE COORDINATION
Care Transitions Outreach Attempt    Attempted to reach patient for transitions of care follow up. Unable to reach patient. Patient: Risa Camara Patient : 1945 MRN: [id number]    Reason for Admission: Decompensated Alcohol Cirrhosis, Hepatic Encephalopathy  Discharge Date: 2/15/23       RARS: Readmission Risk Score: 33.8  Facility: [facility]  Last Discharge [address]       Date Complaint Diagnosis Description Type Department Provider    2/10/23 Altered Mental Status Increased ammonia level . .. ED to Hosp-Admission (Discharged) (ADMITTED) Richmond Marquis MD; Gaile Mask. ..             Noted following upcoming appointments from discharge chart review:   [facility] follow up appointment(s):   Future Appointments   Date Time Provider Lucita Murphy   3/7/2023 10:30 AM [facility] IR ROOM 1 [id number] [facility]   3/8/2023  9:00 AM Tim Sahu MD [address] [place] ID [facility]   3/8/2023  1:40 PM Eryn Day, APRN -  [address] [facility]   3/14/2023 10:00 AM [facility] IR ROOM 1 SRMZ SPPROC [facility]   2023 11:30 AM Ivory Winters MD [address] [place] Gastro [facility]   2023  2:45 PM Erin Lara MD [address], [phone]

## 2023-03-07 ENCOUNTER — CARE COORDINATION (OUTPATIENT)
Dept: CASE MANAGEMENT | Age: 78
End: 2023-03-07

## 2023-03-07 ENCOUNTER — HOSPITAL ENCOUNTER (OUTPATIENT)
Dept: INTERVENTIONAL RADIOLOGY/VASCULAR | Age: 78
Discharge: HOME | End: 2023-03-07
Payer: COMMERCIAL

## 2023-03-07 VITALS
HEART RATE: 80 BPM | RESPIRATION RATE: 18 BRPM | OXYGEN SATURATION: 96 % | SYSTOLIC BLOOD PRESSURE: 114 MMHG | DIASTOLIC BLOOD PRESSURE: 57 MMHG | TEMPERATURE: 96.9 F

## 2023-03-07 DIAGNOSIS — K74.60 HEPATIC CIRRHOSIS, UNSPECIFIED HEPATIC CIRRHOSIS TYPE, UNSPECIFIED WHETHER ASCITES PRESENT (HCC): ICD-10-CM

## 2023-03-07 DIAGNOSIS — R18.8 OTHER ASCITES: ICD-10-CM

## 2023-03-07 LAB
FLUID TYPE: NORMAL INDEX
LYMPHOCYTES, BODY FLUID: 26 %
MESOTHELIAL FLUID: 13 /100 WBC
MONOCYTE, FLUID: NORMAL %
NEUTROPHIL, FLUID: 23 %
OTHER CELLS FLUID: NORMAL
RBC FLUID: 128 /CU MM
WBC FLUID: 99 /CU MM

## 2023-03-07 PROCEDURE — 89051 BODY FLUID CELL COUNT: CPT

## 2023-03-07 PROCEDURE — P9047 ALBUMIN (HUMAN), 25%, 50ML: HCPCS | Performed by: RADIOLOGY

## 2023-03-07 PROCEDURE — 6360000002 HC RX W HCPCS: Performed by: RADIOLOGY

## 2023-03-07 PROCEDURE — C1729 CATH, DRAINAGE: HCPCS

## 2023-03-07 PROCEDURE — 87205 SMEAR GRAM STAIN: CPT

## 2023-03-07 PROCEDURE — 7100000010 HC PHASE II RECOVERY - FIRST 15 MIN

## 2023-03-07 PROCEDURE — 49083 ABD PARACENTESIS W/IMAGING: CPT

## 2023-03-07 PROCEDURE — 87070 CULTURE OTHR SPECIMN AEROBIC: CPT

## 2023-03-07 PROCEDURE — P9047 ALBUMIN (HUMAN), 25%, 50ML: HCPCS

## 2023-03-07 PROCEDURE — 7100000011 HC PHASE II RECOVERY - ADDTL 15 MIN

## 2023-03-07 PROCEDURE — 6360000002 HC RX W HCPCS

## 2023-03-07 RX ORDER — ALBUMIN (HUMAN) 12.5 G/50ML
SOLUTION INTRAVENOUS CONTINUOUS PRN
Status: COMPLETED | OUTPATIENT
Start: 2023-03-07 | End: 2023-03-07

## 2023-03-07 RX ORDER — SODIUM CHLORIDE 0.9 % (FLUSH) 0.9 %
10 SYRINGE (ML) INJECTION PRN
Status: DISCONTINUED | OUTPATIENT
Start: 2023-03-07 | End: 2023-03-08 | Stop reason: HOSPADM

## 2023-03-07 RX ADMIN — ALBUMIN (HUMAN) 50 G: 0.25 INJECTION, SOLUTION INTRAVENOUS at 11:09

## 2023-03-07 ASSESSMENT — PAIN SCALES - GENERAL: PAINLEVEL_OUTOF10: 0

## 2023-03-07 NOTE — PROCEDURES
PROCEDURE PERFORMED: Paracentesis    PRIMARY INDICATION FOR PROCEDURE: Ascites    INFORMED CONSENT:  Obtained prior to procedure. Consent placed in chart. ASSESSMENT:  Pt is alert and oriented x4. Pt verbalizes understanding of procedure. BARRIER PRECAUTIONS & STERILE TECHNIQUE:               Pt remains in bed and positioned semi-griffiths for procedure. . Warm blankets given. Pt placed on Vitals Signs Monitor. Pt prepped and draped in a sterile fashion with chlorhexadine. Name, , allergies, labs, code status and procedure reviewed during time out. PAIN/LOCAL ANESTHESIA/SEDATION MANAGEMENT:           Local: Lidocaine 1% given by Dr Nany Andre at [address]:           ACCESS TIME: 1012          US/FLUORO: 5 US Images          WIRE USED:           SHEATH USED:           CATHETER USED: one step x2          FINAL IMAGE TAKEN TO CONFIRM PLACEMENT OF:  catheter          CONTRAST/CC:     1012 - Access with one step to RUQ white fluid returns   1040 - Access removed   1040 - A total of 5520 white fluid removed from RUQ  1042 - Lidocaine given by Dr Nany Andre to second area RLQ  1042 - Access with one step to RLQ white fluid returns  1104 - Access removed a total of 3300 cc fluid from RLQ    A Total of 8820 cc white fluid removed. A total of 50 g Albumin given per orders.       STERILE DRESSINGS: 4x4 gauze and tegaderm    SPECIMENS: 1020 cc white fluid sent    EBL:   less than 1 cc     COMPLICATIONS: none    REPORT CALLED TO: German Dasilva RN in SDS at [facility]

## 2023-03-07 NOTE — PROGRESS NOTES
1116- Patient returned back to [facility]. Report given to this nurse from [facility] IR. Patient is A&O able to make needs known, No C/O pain beverage of choice offered to patient call light in reach bed in lowest position. 1124- IV removed discharge instructions given to patient and his spouse both verbalized understanding  200- Patient escorted to car via W/C where spouse is transporting home.

## 2023-03-07 NOTE — DISCHARGE INSTRUCTIONS
Watch for signs of infection    Elevated temperature, redness or pain at site,   Drainage at incision      [address] with COVID-19 may have no symptoms, mild symptoms, such as fever, cough, and shortness of breath or they may have more severe illness, developing severe and fatal pneumonia. As a result, Advance Care Planning with attention to naming a health care decision maker (someone you trust to make healthcare decisions for you if you could not speak for yourself) and sharing other health care preferences is important BEFORE a possible health crisis. Please contact your Primary Care Provider to discuss Advance Care Planning. Preventing the Spread of Coronavirus Disease 2019 in Homes and Residential Communities  For the most recent information go to [employer].    Prevention steps for People with confirmed or suspected COVID-19 (including persons under investigation) who do not need to be hospitalized  and   People with confirmed COVID-19 who were hospitalized and determined to be medically stable to go home    Your healthcare provider and public health staff will evaluate whether you can be cared for at home. If it is determined that you do not need to be hospitalized and can be isolated at home, you will be monitored by staff from your local or state health department. You should follow the prevention steps below until a healthcare provider or local or state health department says you can return to your normal activities. Stay home except to get medical care  People who are mildly ill with COVID-19 are able to isolate at home during their illness. You should restrict activities outside your home, except for getting medical care. Do not go to work, school, or public areas. Avoid using public transportation, ride-sharing, or taxis.   Separate yourself from other people and animals in your home  People: As much as possible, you should stay in a specific room and away from other people in your home. Also, you should use a separate bathroom, if available. Animals: You should restrict contact with pets and other animals while you are sick with COVID-19, just like you would around other people. Although there have not been reports of pets or other animals becoming sick with COVID-19, it is still recommended that people sick with COVID-19 limit contact with animals until more information is known about the virus. When possible, have another member of your household care for your animals while you are sick. If you are sick with COVID-19, avoid contact with your pet, including petting, snuggling, being kissed or licked, and sharing food. If you must care for your pet or be around animals while you are sick, wash your hands before and after you interact with pets and wear a facemask. Call ahead before visiting your doctor  If you have a medical appointment, call the healthcare provider and tell them that you have or may have COVID-19. This will help the healthcare providers office take steps to keep other people from getting infected or exposed. Wear a facemask  You should wear a facemask when you are around other people (e.g., sharing a room or vehicle) or pets and before you enter a healthcare providers office. If you are not able to wear a facemask (for example, because it causes trouble breathing), then people who live with you should not stay in the same room with you, or they should wear a facemask if they enter your room. Cover your coughs and sneezes  Cover your mouth and nose with a tissue when you cough or sneeze. Throw used tissues in a lined trash can. Immediately wash your hands with soap and water for at least 20 seconds or, if soap and water are not available, clean your hands with an alcohol-based hand  that contains at least 60% alcohol.   Clean your hands often  Wash your hands often with soap and water for at least 20 seconds, especially after blowing your nose, coughing, or sneezing; going to the bathroom; and before eating or preparing food. If soap and water are not readily available, use an alcohol-based hand  with at least 60% alcohol, covering all surfaces of your hands and rubbing them together until they feel dry. Soap and water are the best option if hands are visibly dirty. Avoid touching your eyes, nose, and mouth with unwashed hands. Avoid sharing personal household items  You should not share dishes, drinking glasses, cups, eating utensils, towels, or bedding with other people or pets in your home. After using these items, they should be washed thoroughly with soap and water. Clean all high-touch surfaces everyday  High touch surfaces include counters, tabletops, doorknobs, bathroom fixtures, toilets, phones, keyboards, tablets, and bedside tables. Also, clean any surfaces that may have blood, stool, or body fluids on them. Use a household cleaning spray or wipe, according to the label instructions. Labels contain instructions for safe and effective use of the cleaning product including precautions you should take when applying the product, such as wearing gloves and making sure you have good ventilation during use of the product. Monitor your symptoms  Seek prompt medical attention if your illness is worsening (e.g., difficulty breathing). Before seeking care, call your healthcare provider and tell them that you have, or are being evaluated for, COVID-19. Put on a facemask before you enter the facility. These steps will help the healthcare providers office to keep other people in the office or waiting room from getting infected or exposed. Ask your healthcare provider to call the local or [facility] health department.  Persons who are placed under active monitoring or facilitated self-monitoring should follow instructions provided by their local health department or occupational health professionals, as appropriate. When working with your local health department check their available hours. If you have a medical emergency and need to call 911, notify the dispatch personnel that you have, or are being evaluated for COVID-19. If possible, put on a facemask before emergency medical services arrive. Discontinuing home isolation  Patients with confirmed COVID-19 should remain under home isolation precautions until the risk of secondary transmission to others is thought to be low. The decision to discontinue home isolation precautions should be made on a case-by-case basis, in consultation with healthcare providers and state and local health departments.

## 2023-03-08 NOTE — H&P
Date:3/8/2023  Name:Boby Stephenson   TFR:   HALIMA#:[id number]    SEX:male   Referring Physician:  Fariba Courtney  Primary Physician:  Rosangela Cardoza  Chief Complaint:  Ascites  History of Present Illness:   Ascites    HISTORY AND PHYSICAL  Hepatic cirrhosis, unspecified hepatic cirrhosis type, unspecified whether ascites present ([facility] [K74.60]  Other ascites [R18.8]    Past Medical History:  Past Medical History:   Diagnosis Date    Anxiety     Cirrhosis, alcoholic ([address]     Diabetes mellitus ([address]     GERD (gastroesophageal reflux disease)     GERD (gastroesophageal reflux disease)     Hyperlipidemia     Hypertension     Portal hypertension ([facility] [address]     Pulmonary nodules     Sleep apnea     SOB (shortness of breath)     Thyroid disease        Past Surgical History:  Past Surgical History:   Procedure Laterality Date    APPENDECTOMY      CHOLECYSTECTOMY      COLONOSCOPY      ENDOSCOPY, COLON, DIAGNOSTIC      FOOT SURGERY      needle removed,not sure which foot, per wife.     TONSILLECTOMY      UPPER GASTROINTESTINAL ENDOSCOPY N/A 2022    EGD BIOPSY performed by Adina Alaniz MD at [address] [place] [address] History:  Social History     Socioeconomic History    Marital status:      Spouse name: Not on file    Number of children: Not on file    Years of education: Not on file    Highest education level: Not on file   Occupational History     Comment: Retired    Tobacco Use    Smoking status: Former     Packs/day: 1.00     Years: 14.00     Pack years: 14.00     Types: Cigarettes     Quit date:      Years since quittin.2    Smokeless tobacco: Never   Vaping Use    Vaping Use: Never used   Substance and Sexual Activity    Alcohol use: Not Currently    Drug use: Never    Sexual activity: Yes     Partners: Female   Other Topics Concern    Not on file   Social History Narrative    Not on file     Social Determinants of Health     Financial Resource Strain: Low Risk Difficulty of Paying Living Expenses: Not hard at all   Food Insecurity: No Food Insecurity    Worried About Running Out of Food in the Last Year: Never true    Ran Out of Food in the Last Year: Never true   Transportation Needs: Not on file   Physical Activity: Not on file   Stress: Not on file   Social Connections: Not on file   Intimate Partner Violence: Not on file   Housing Stability: Not on file       Family History:  Family History   Problem Relation Age of Onset    Hypertension Brother     Diabetes Other        Allergies:   Allergies   Allergen Reactions    Lisinopril Swelling     Tongue swelling      Zetia [Ezetimibe] Swelling     Facial swelling    Atorvastatin     Codeine Other (See Comments)     Blood pressure drops    Hydrochlorothiazide     Hydroxyzine      Fatigue and depressed    Lexapro [Escitalopram Oxalate]      Increased depression    Pravastatin     Daptomycin Rash and Other (See Comments)     Hands drawn involuntarily       Medications:  Current Outpatient Medications on File Prior to Encounter   Medication Sig Dispense Refill    albuterol sulfate HFA (PROVENTIL;VENTOLIN;PROAIR) 108 (90 Base) MCG/ACT inhaler INHALE 2 PUFFS BY MOUTH 4 TIMES DAILY AS NEEDED FOR WHEEZING 1 each 2    triamcinolone (KENALOG) 0.1 % ointment Apply topically 2 times daily for 7 days 30 g 1    linezolid (ZYVOX) 600 MG tablet Take 1 tablet by mouth 2 times daily for 12 days 24 tablet 0    lactulose (CHRONULAC) 10 GM/15ML solution Take 30 mLs by mouth 3 times daily Hold if having diarrhea 2700 mL 5    spironolactone (ALDACTONE) 100 MG tablet Take 1 tablet by mouth 2 times daily 30 tablet 2    rifAXIMin (XIFAXAN) 550 MG tablet Take 1 tablet by mouth 2 times daily 42 tablet 0    thiamine 100 MG tablet Take 1 tablet by mouth daily (Patient taking differently: Take 100 mg by mouth daily OTC) 30 tablet 3    furosemide (LASIX) 40 MG tablet Take 1 tablet by mouth daily 60 tablet 3    levothyroxine (SYNTHROID) 50 MCG tablet Take 1 tablet by mouth Daily 90 tablet 1    DULoxetine (CYMBALTA) 60 MG extended release capsule TAKE 1 CAPSULE BY MOUTH ONCE DAILY (Patient taking differently: Take 60 mg by mouth daily TAKE 1 CAPSULE BY MOUTH ONCE DAILY) 90 capsule 1    betamethasone valerate (VALISONE) 0.1 % lotion Apply topically 2 times daily prn scalp rash 60 mL 3    nadolol (CORGARD) 40 MG tablet Take 1 tablet by mouth in the morning. (Patient taking differently: Take 40 mg by mouth nightly) 90 tablet 3    Multiple Vitamin (MULTI VITAMIN MENS PO) Take 1 tablet by mouth daily      ESOMEPRAZOLE MAGNESIUM PO Take 20 mg by mouth daily OTC      Omega-3 1000 MG CAPS Take 2,000 mg by mouth daily       No current facility-administered medications on file prior to encounter. ROS: No fevers or chills    Vital Signs: There is no height or weight on file to calculate BMI. Laboratory:  No results for input(s): WBC, HEMOGLOBIN, NA, POTASSIUM, CL, CO2, BUN, CREATININE, GLUCOSE, CA, INR, PTT, CKMB in the last 72 hours. Invalid input(s): HEMATOCRIT, PLATELETS, PT, CK1, TROP  INR @LABR24(INR)@    Physical Exam:  GENERAL:Well developed, well nourished in NAD  NECK: Neck exam - No JVD,HJR or carotid bruit, no thyromegaly   RESPIRATORY:Clear to auscultation  HEART:RRR,no murmer, gallop or friction rub  ABDOMEN: Bowel sounds present, no tenderness to palpation. No organomegaly. Moderate abdominal distention  EXTREMITIES: no edema or cyanosis  VASCULAR:  no ischemic changes  SKIN: no erythema, rubor or lesions noted  NEURO/PSYCH:Alert and oriented    EKG:    Imaging:    Chest: CTA    Heart: S1, S1    Impression:  Active Problems:    * No active hospital problems. *  Resolved Problems:    * No resolved hospital problems.  *      Ascites    Mallampati Score 2  ASA class 2    PLAN OF CARE/PLANNED PROCEDURE    IR US GUIDED PARACENTESIS W IMAGING [[id number]]

## 2023-03-09 NOTE — PROGRESS NOTES
Spoke with patient's wife and she stated \" that patient has already had central line removed. \" Scheduling notified.

## 2023-03-10 ENCOUNTER — OFFICE VISIT (OUTPATIENT)
Dept: CARDIOLOGY CLINIC | Age: 78
End: 2023-03-10
Payer: COMMERCIAL

## 2023-03-10 ENCOUNTER — CARE COORDINATION (OUTPATIENT)
Dept: CASE MANAGEMENT | Age: 78
End: 2023-03-10

## 2023-03-10 VITALS
SYSTOLIC BLOOD PRESSURE: 110 MMHG | HEIGHT: 68 IN | BODY MASS INDEX: 33.34 KG/M2 | HEART RATE: 75 BPM | DIASTOLIC BLOOD PRESSURE: 60 MMHG | WEIGHT: 220 LBS

## 2023-03-10 DIAGNOSIS — I33.0 ACUTE BACTERIAL ENDOCARDITIS: ICD-10-CM

## 2023-03-10 DIAGNOSIS — I50.9 CHRONIC HEART FAILURE, UNSPECIFIED HEART FAILURE TYPE (HCC): Primary | ICD-10-CM

## 2023-03-10 LAB
CULTURE: ABNORMAL
GRAM SMEAR: ABNORMAL
Lab: ABNORMAL
SPECIMEN: ABNORMAL

## 2023-03-10 PROCEDURE — 99214 OFFICE O/P EST MOD 30 MIN: CPT | Performed by: NURSE PRACTITIONER

## 2023-03-10 PROCEDURE — 3078F DIAST BP <80 MM HG: CPT | Performed by: NURSE PRACTITIONER

## 2023-03-10 PROCEDURE — 3074F SYST BP LT 130 MM HG: CPT | Performed by: NURSE PRACTITIONER

## 2023-03-10 PROCEDURE — 1124F ACP DISCUSS-NO DSCNMKR DOCD: CPT | Performed by: NURSE PRACTITIONER

## 2023-03-10 PROCEDURE — 93000 ELECTROCARDIOGRAM COMPLETE: CPT | Performed by: NURSE PRACTITIONER

## 2023-03-10 ASSESSMENT — ENCOUNTER SYMPTOMS
ORTHOPNEA: 0
SHORTNESS OF BREATH: 0

## 2023-03-10 NOTE — PROGRESS NOTES
3/10/2023  Primary cardiologist: Dr. Елена Romero:   Fiordaliza Petty  is an established 78 y.o.  male here for a  follow up on hospital/EVA      SUBJECTIVE/OBJECTIVE:  Fiordaliza Petty is a 78 y.o. male with a history of tricuspid valve endocarditis, alcoholic cirrhosis, hypertension, hyperlipidemia,  diabetes mellitus, portal hypertension, esophageal varices sleep apnea, thyroid disease and Ménière's      HPI :   Firodaliza Petty was in the hospital in February 2023 and noted to have vegetation on the tricuspid valve. He has been following up with ID and currently on antibiotic treatment. Today states he is feeling a little tired however overall states he is feeling better. Reports he underwent paracentesis yesterday      Review of Systems   Constitutional: Positive for malaise/fatigue. Negative for diaphoresis. Cardiovascular:  Negative for chest pain, claudication, dyspnea on exertion, irregular heartbeat, leg swelling (trace), near-syncope, orthopnea, palpitations and paroxysmal nocturnal dyspnea. Respiratory:  Negative for shortness of breath. Neurological:  Positive for dizziness. Negative for light-headedness. Vitals:    03/10/23 1443   BP: 110/60   Pulse: 75   Weight: 220 lb (99.8 kg)   Height: 5' 8\" (1.727 m)     No flowsheet data found. Wt Readings from Last 3 Encounters:   03/10/23 220 lb (99.8 kg)   03/02/23 228 lb (103.4 kg)   02/23/23 220 lb 9.6 oz (100.1 kg)     Body mass index is 33.45 kg/m². Physical Exam  HENT:      Head: Normocephalic and atraumatic. Mouth/Throat:      Mouth: Mucous membranes are moist.   Eyes:      Extraocular Movements: Extraocular movements intact. Pupils: Pupils are equal, round, and reactive to light. Neck:      Vascular: No carotid bruit. Cardiovascular:      Rate and Rhythm: Normal rate and regular rhythm. Pulses: Normal pulses. Pulmonary:      Effort: Pulmonary effort is normal.   Abdominal:      General: There is no distension.       Palpations: Abdomen is soft.      Tenderness: There is no abdominal tenderness. Musculoskeletal:         General: Normal range of motion. Cervical back: No tenderness. Right lower leg: No edema. Left lower leg: No edema. Skin:     General: Skin is warm and dry. Capillary Refill: Capillary refill takes less than 2 seconds. Neurological:      General: No focal deficit present. Mental Status: He is alert and oriented to person, place, and time. Psychiatric:         Mood and Affect: Mood normal.         Behavior: Behavior normal.              Current Outpatient Medications   Medication Sig Dispense Refill    albuterol sulfate HFA (PROVENTIL;VENTOLIN;PROAIR) 108 (90 Base) MCG/ACT inhaler INHALE 2 PUFFS BY MOUTH 4 TIMES DAILY AS NEEDED FOR WHEEZING 1 each 2    linezolid (ZYVOX) 600 MG tablet Take 1 tablet by mouth 2 times daily for 12 days 24 tablet 0    lactulose (CHRONULAC) 10 GM/15ML solution Take 30 mLs by mouth 3 times daily Hold if having diarrhea 2700 mL 5    spironolactone (ALDACTONE) 100 MG tablet Take 1 tablet by mouth 2 times daily 30 tablet 2    rifAXIMin (XIFAXAN) 550 MG tablet Take 1 tablet by mouth 2 times daily 42 tablet 0    thiamine 100 MG tablet Take 1 tablet by mouth daily (Patient taking differently: Take 100 mg by mouth daily OTC) 30 tablet 3    furosemide (LASIX) 40 MG tablet Take 1 tablet by mouth daily 60 tablet 3    levothyroxine (SYNTHROID) 50 MCG tablet Take 1 tablet by mouth Daily 90 tablet 1    DULoxetine (CYMBALTA) 60 MG extended release capsule TAKE 1 CAPSULE BY MOUTH ONCE DAILY (Patient taking differently: Take 60 mg by mouth daily TAKE 1 CAPSULE BY MOUTH ONCE DAILY) 90 capsule 1    betamethasone valerate (VALISONE) 0.1 % lotion Apply topically 2 times daily prn scalp rash 60 mL 3    nadolol (CORGARD) 40 MG tablet Take 1 tablet by mouth in the morning.  (Patient taking differently: Take 40 mg by mouth nightly) 90 tablet 3    Multiple Vitamin (MULTI VITAMIN MENS PO) Take 1 tablet by mouth daily      ESOMEPRAZOLE MAGNESIUM PO Take 20 mg by mouth daily OTC      Omega-3 1000 MG CAPS Take 2,000 mg by mouth daily       No current facility-administered medications for this visit. All pertinent data reviewed and discussed with patient       ASSESSMENT/PLAN:    Tricuspid valve endocarditis  Transesophageal echocardiogram in February 2023 revealed tricuspid valve endocarditis. No evidence of thrombus within the left atrial appendage. There is a mobile echo density noted on the tricuspid valve leaflet. Currently being followed by infectious disease and on antibiotics. His antibiotics dosing will be up in 3 doses. He denies symptoms such as shortness of breath or chest pain. He remains afebrile  Case discussed with Dr. Raysa Perez :will plan for reevaluation of endocarditis with EVA. Chronic HFpEF  Secondary to cirrhosis. Clinically not volume overloaded. Has trace edema to lower extremities however much improved since seen in the hospital.  Continue with Lasix nadolol and spironolactone    Follow-up with GI    Tests ordered: EVA  Follow-up after testing    Signed:  MOHINDER Haney CNP, 3/10/2023, 2:54 PM    An electronic signature was used to authenticate this note. Please note this report has been partially produced using speech recognition software and may contain errors related to that system including errors in grammar, punctuation, and spelling, as well as words and phrases that may be inappropriate. If there are any questions or concerns please feel free to contact the dictating provider for clarification.

## 2023-03-10 NOTE — CARE COORDINATION
Care Transitions Outreach Attempt      Patient: Rufus Smith Patient : 1945 MRN: [id number]  Reason for Admission: Decompensated Alcohol Cirrhosis, Hepatic Encephalopathy  Discharge Date: 2/15/23       RARS: Readmission Risk Score: 33.8  Facility: [facility]  Last Discharge  Street       Date Complaint Diagnosis Description Type Department Provider    2/10/23 Altered Mental Status Increased ammonia level . .. ED to Hosp-Admission (Discharged) (ADMITTED) Yuli Medina MD; Kristie Smith. .. Noted following upcoming appointments from discharge chart review:   [facility]   Future Appointments   Date Time Provider Lucita Murphy   3/10/2023  2:20 PM MOHINDER Castaneda [address] Heart MMA   3/21/2023 10:30 AM [facility] IR ROOM 1 [facility] Radiolo   2023 11:30 AM [address] [facility]   2023 11:30 AM Jerica Borges MD [facility] Gastro MMA   2023  2:45 PM Cherri Lieberman MD [facility] FPS MMA     Attempt to reach for Care Transition follow up call unsuccessful. Will re-attempt at later date.  [address] [place] [address], [facility] [place] [phone]

## 2023-03-13 ENCOUNTER — TELEPHONE (OUTPATIENT)
Dept: CARDIOLOGY CLINIC | Age: 78
End: 2023-03-13

## 2023-03-13 NOTE — TELEPHONE ENCOUNTER
Patient was educated per telephone on EVA for Dx: ENDOCARDITIS. Procedure is scheduled for 3/17/23 1030 AM, w/arrival @ 930 AM, @ [facility]. Instructions were given to patient to remain NPO after midnight the night before procedure. Patient may take morning meds the morning of procedure with small amount of water. Patient to follow instructions given over telephone. Patient was advised that procedure time could be changed due to an emergency. Patient voiced understanding.

## 2023-03-14 ENCOUNTER — HOSPITAL ENCOUNTER (OUTPATIENT)
Dept: INTERVENTIONAL RADIOLOGY/VASCULAR | Age: 78
Discharge: HOME | End: 2023-03-14

## 2023-03-14 ENCOUNTER — CARE COORDINATION (OUTPATIENT)
Dept: CASE MANAGEMENT | Age: 78
End: 2023-03-14

## 2023-03-14 NOTE — PROGRESS NOTES
IR Procedure at [facility]:  Spoke with patient's wife Indio Hurd and patient will arrive at 0900 at [facility] on 3/21/2023 for his procedure at 1030. Patient has been given below instructions. (Paracentesis, Thoracentesis, Thyroid Bx and Injections may have a light breakfast)  2. Follow your directions as prescribed by the doctor for your procedure and medications. 3.   Consult your provider as to when to stop blood thinner  4. Do not take any pain medication within 6 hours of your procedure  5. Do not drink any alcoholic beverages or use any street drugs 24 hours before procedure. 6.   Please wear simple, loose fitting clothing to the hospital.  Do not bring valuables (money,             credit cards, checkbooks, etc.)     7. If you  have a Living Will and Durable Power of  for Healthcare, please bring in a copy. 8.   Please bring picture ID,  insurance card, paperwork from the doctors office            (H & P, Consent,  & card for implantable devices). 9.   Report to the information desk on the ground floor. 10. Take a shower the night before or morning of your procedure, do not apply any lotion, oil or powder. 11. If you are going to be sedated for the procedure, you will need a responsible adult to drive you home.

## 2023-03-14 NOTE — CARE COORDINATION
Care Transitions Outreach Attempt    Attempted to reach patient for transitions of care follow up. Unable to reach patient. Patient: Eli Shulka Patient : 1945 MRN: [id number]  Reason for Admission: Decompensated Alcohol Cirrhosis, Hepatic Encephalopathy  Discharge Date: 2/15/23       RARS: Readmission Risk Score: 33.8  Facility: [facility]    Last Discharge [address]       Date Complaint Diagnosis Description Type Department Provider    2/10/23 Altered Mental Status Increased ammonia level . .. ED to Hosp-Admission (Discharged) (ADMITTED) Nanci Lomas MD; Valeriano Carcamo. ..           Noted following upcoming appointments from discharge chart review:   [address] follow up appointment(s):   Future Appointments   Date Time Provider Lucita Murphy   3/17/2023 10:30 AM SCHEDULE, [address] [place] [address]   3/21/2023 10:30 AM [facility] IR ROOM 1 SR[facility] SPPROC [facility] Radiolo   2023 11:30 AM SPECIALS ROOM [address] [facility] Radiolo   2023 11:30 AM Jg Gonzalez MD [facility] Gastro MMA   2023  2:45 PM Joslyn Nicole MD [facility] FPS [facility]   2023  2:10 PM Hayder Bach MD [facility] Heart [address], [facility] [phone]

## 2023-03-14 NOTE — PATIENT INSTRUCTIONS
IR Procedure at [facility]:  Spoke with patient's wife Uziel Palm and patient will arrive at        (Paracentesis, Thoracentesis, Thyroid Bx and Injections may have a light breakfast)  2. Follow your directions as prescribed by the doctor for your procedure and medications. 3.   Consult your provider as to when to stop blood thinner  4. Do not take any pain medication within 6 hours of your procedure  5. Do not drink any alcoholic beverages or use any street drugs 24 hours before procedure. 6.   Please wear simple, loose fitting clothing to the hospital.  Do not bring valuables (money,             credit cards, checkbooks, etc.)     7. If you  have a Living Will and Durable Power of  for Healthcare, please bring in a copy. 8.   Please bring picture ID,  insurance card, paperwork from the doctors office            (H & P, Consent,  & card for implantable devices). 9.   Report to the information desk on the ground floor. 10. Take a shower the night before or morning of your procedure, do not apply any lotion, oil or powder. 11. If you are going to be sedated for the procedure, you will need a responsible adult to drive you home.

## 2023-03-16 ENCOUNTER — CARE COORDINATION (OUTPATIENT)
Dept: CASE MANAGEMENT | Age: 78
End: 2023-03-16

## 2023-03-16 DIAGNOSIS — R18.8 OTHER ASCITES: Primary | ICD-10-CM

## 2023-03-16 NOTE — CARE COORDINATION
[facility] Care Transitions Follow Up Call    Patient Current Location: [address]  Transition Nurse contacted the family/spouse/Gaby by telephone to follow up after admission. Verified name and  with family as identifiers. Patient: Nora Gonzáles  Patient : 1945   MRN: <[id number]>  Reason for Admission: 2/10/2023 - 2/15/2023 [address] [facility] [address] Decompensated Alcohol Cirrhosis, Hepatic Encephalopathy. Discharge Date: 2/15/23 RARS: Readmission Risk Score: 33.8    PCP 3/2/23 Attended. Cardio/Kellis 3/10 Attended  ID/Onuorah  Attended    Needs to be reviewed by the provider   Additional needs identified to be addressed with provider: No  none             Method of communication with provider: none. Spouse/Gaby shares pt is not feeling very well. He has gained 15 lbs & reports today's wt as 222 lbs, has significant abd distention, and c/o abd ache. Denies any LE edema increases or SOB concerns. Pt does wear 2L NC at HS/PRN and states no desat concerns and sats remain stable in the 90's. He has some exertional SOB w/ distances. No cough or congestion concerns. Last paracentesis was last Tuesday and reports greater than 10,000 cc fluid removed. Pt is currently being set up for paracentesis tomorrow. Current  HHC. Has/taking meds as directed no refill needs. Addressed changes since last contact:  none  Discussed follow-up appointments. If no appointment was previously scheduled, appointment scheduling offered: Yes. Is follow up appointment scheduled within 7 days of discharge?  See above appt list.    Follow Up  Future Appointments   Date Time Provider Lucita Murphy   3/17/2023 10:30 AM SCHEDULE, [address] [place] [address]   3/21/2023 10:30 AM [facility] IR ROOM 1 SR[facility] SPPROC [facility] Radiolo   2023 11:30 AM SPECIALS ROOM [address] [facility] Radiolo   2023 11:30 AM Colette Morejon MD [facility] Gastro MMA   2023  2:45 PM Shilpa Givens Sam Loco MD [facility]  [address]   6/20/2023  2:10 PM Madai Maxwell MD [facility] Heart MMA     Non-BS follow up appointment(s):     Care Transition Nurse reviewed red flags with family and discussed any barriers to care and/or understanding of plan of care after discharge. Discussed appropriate site of care based on symptoms and resources available to patient including: When to call 911. The family agrees to contact the PCP office for questions related to their healthcare. Advance Care Planning:   reviewed and current. Patients top risk factors for readmission:  Fluid Overload  Interventions to address risk factors:  Pending next paracentesis tomorrow. Reviewed to report wt gains, increasing abd distention, increasing SOB, sats trending less than 90%, increasing coughing/congestion, increasing abd pain, N/V, loss of appetite, confusion to physician/return to ED. Offered patient enrollment in the Remote Patient Monitoring (RPM) program for in-home monitoring:  Not reviewed this call . Care Transitions Subsequent and Final Call    Subsequent and Final Calls  Do you have any ongoing symptoms?: Yes  Patient-reported symptoms: Abdominal Pain, Shortness of Breath, Other, Weight Gain  Have your medications changed?: No  Do you have any questions related to your medications?: No  Do you currently have any active services?: Yes  Are you currently active with any services?: Home Health  Do you have any needs or concerns that I can assist you with?: No  Identified Barriers: Lack of Education  Care Transitions Interventions   Home Care Waiver: Completed        Transportation Support: Declined      DME Assistance: Declined     Senior Services: Declined    Other Interventions:             Care Transition Nurse provided contact information for future needs. Plan for follow-up call in 7-10 days based on severity of symptoms and risk factors. Plan for next call:  CT FU, Check fluid removed from paracentesis 3/16 and wt.      Milo Herrera Angel Alegria

## 2023-03-17 ENCOUNTER — HOSPITAL ENCOUNTER (OUTPATIENT)
Dept: NON INVASIVE DIAGNOSTICS | Age: 78
Discharge: HOME | End: 2023-03-17
Payer: COMMERCIAL

## 2023-03-17 VITALS
SYSTOLIC BLOOD PRESSURE: 105 MMHG | RESPIRATION RATE: 16 BRPM | OXYGEN SATURATION: 92 % | DIASTOLIC BLOOD PRESSURE: 65 MMHG | HEART RATE: 76 BPM

## 2023-03-17 DIAGNOSIS — I50.9 CONGESTIVE HEART FAILURE, UNSPECIFIED HF CHRONICITY, UNSPECIFIED HEART FAILURE TYPE (HCC): Primary | ICD-10-CM

## 2023-03-17 DIAGNOSIS — I50.9 CONGESTIVE HEART FAILURE, UNSPECIFIED HF CHRONICITY, UNSPECIFIED HEART FAILURE TYPE (HCC): ICD-10-CM

## 2023-03-17 PROBLEM — I38 VHD (VALVULAR HEART DISEASE): Status: ACTIVE | Noted: 2023-03-17

## 2023-03-17 LAB
HCT VFR BLD CALC: 30.3 % (ref 42–52)
HEMOGLOBIN: 10.1 GM/DL (ref 13.5–18)
MCH RBC QN AUTO: 31.8 PG (ref 27–31)
MCHC RBC AUTO-ENTMCNC: 33.3 % (ref 32–36)
MCV RBC AUTO: 95.3 FL (ref 78–100)
PDW BLD-RTO: 14.1 % (ref 11.7–14.9)
PLATELET # BLD: 105 K/CU MM (ref 140–440)
PMV BLD AUTO: 11.1 FL (ref 7.5–11.1)
RBC # BLD: 3.18 M/CU MM (ref 4.6–6.2)
REASON FOR REJECTION: NORMAL
REJECTED TEST: NORMAL
WBC # BLD: 6.5 K/CU MM (ref 4–10.5)

## 2023-03-17 PROCEDURE — 7100000001 HC PACU RECOVERY - ADDTL 15 MIN

## 2023-03-17 PROCEDURE — 93312 ECHO TRANSESOPHAGEAL: CPT

## 2023-03-17 PROCEDURE — 7100000000 HC PACU RECOVERY - FIRST 15 MIN

## 2023-03-17 PROCEDURE — 85027 COMPLETE CBC AUTOMATED: CPT

## 2023-03-17 PROCEDURE — 80048 BASIC METABOLIC PNL TOTAL CA: CPT

## 2023-03-18 ENCOUNTER — HOSPITAL ENCOUNTER (EMERGENCY)
Age: 78
Discharge: HOME | End: 2023-03-18
Attending: EMERGENCY MEDICINE
Payer: COMMERCIAL

## 2023-03-18 VITALS
HEART RATE: 76 BPM | RESPIRATION RATE: 14 BRPM | TEMPERATURE: 98 F | DIASTOLIC BLOOD PRESSURE: 70 MMHG | SYSTOLIC BLOOD PRESSURE: 126 MMHG | OXYGEN SATURATION: 97 %

## 2023-03-18 DIAGNOSIS — R18.8 OTHER ASCITES: ICD-10-CM

## 2023-03-18 DIAGNOSIS — R53.83 FATIGUE, UNSPECIFIED TYPE: Primary | ICD-10-CM

## 2023-03-18 LAB
ALBUMIN SERPL-MCNC: 3 GM/DL (ref 3.4–5)
ALP BLD-CCNC: 83 IU/L (ref 40–129)
ALT SERPL-CCNC: 22 U/L (ref 10–40)
AMMONIA: 54 UMOL/L (ref 16–60)
ANION GAP SERPL CALCULATED.3IONS-SCNC: 9 MMOL/L (ref 4–16)
AST SERPL-CCNC: 38 IU/L (ref 15–37)
BASE EXCESS: 1 (ref 0–3.3)
BASOPHILS ABSOLUTE: 0.1 K/CU MM
BASOPHILS RELATIVE PERCENT: 0.9 % (ref 0–1)
BILIRUB SERPL-MCNC: 0.4 MG/DL (ref 0–1)
BUN SERPL-MCNC: 58 MG/DL (ref 6–23)
CALCIUM SERPL-MCNC: 10 MG/DL (ref 8.3–10.6)
CHLORIDE BLD-SCNC: 100 MMOL/L (ref 99–110)
CHP ED QC CHECK: YES
CO2: 24 MMOL/L (ref 21–32)
CREAT SERPL-MCNC: 2.6 MG/DL (ref 0.9–1.3)
DIFFERENTIAL TYPE: ABNORMAL
EOSINOPHILS ABSOLUTE: 0.2 K/CU MM
EOSINOPHILS RELATIVE PERCENT: 3.6 % (ref 0–3)
GFR SERPL CREATININE-BSD FRML MDRD: 25 ML/MIN/1.73M2
GLUCOSE BLD-MCNC: 116 MG/DL
GLUCOSE BLD-MCNC: 116 MG/DL (ref 70–99)
GLUCOSE SERPL-MCNC: 119 MG/DL (ref 70–99)
HCO3 VENOUS: 26 MMOL/L (ref 19–25)
HCT VFR BLD CALC: 31.5 % (ref 42–52)
HEMOGLOBIN: 10.5 GM/DL (ref 13.5–18)
IMMATURE NEUTROPHIL %: 0.2 % (ref 0–0.43)
LYMPHOCYTES ABSOLUTE: 0.5 K/CU MM
LYMPHOCYTES RELATIVE PERCENT: 9.8 % (ref 24–44)
MCH RBC QN AUTO: 31.3 PG (ref 27–31)
MCHC RBC AUTO-ENTMCNC: 33.3 % (ref 32–36)
MCV RBC AUTO: 94 FL (ref 78–100)
MONOCYTES ABSOLUTE: 0.4 K/CU MM
MONOCYTES RELATIVE PERCENT: 7.5 % (ref 0–4)
NUCLEATED RBC %: 0 %
O2 SAT, VEN: 94.6 % (ref 50–70)
PCO2, VEN: 54 MMHG (ref 38–52)
PDW BLD-RTO: 13.7 % (ref 11.7–14.9)
PH VENOUS: 7.29 (ref 7.32–7.42)
PLATELET # BLD: 97 K/CU MM (ref 140–440)
PMV BLD AUTO: 10.9 FL (ref 7.5–11.1)
PO2, VEN: 113 MMHG (ref 28–48)
POTASSIUM SERPL-SCNC: 4.8 MMOL/L (ref 3.5–5.1)
RBC # BLD: 3.35 M/CU MM (ref 4.6–6.2)
REASON FOR REJECTION: NORMAL
REJECTED TEST: NORMAL
SEGMENTED NEUTROPHILS ABSOLUTE COUNT: 4.3 K/CU MM
SEGMENTED NEUTROPHILS RELATIVE PERCENT: 78 % (ref 36–66)
SODIUM BLD-SCNC: 133 MMOL/L (ref 135–145)
TOTAL IMMATURE NEUTOROPHIL: 0.01 K/CU MM
TOTAL NUCLEATED RBC: 0 K/CU MM
TOTAL PROTEIN: 6.9 GM/DL (ref 6.4–8.2)
TSH SERPL DL<=0.005 MIU/L-ACNC: 1.53 UIU/ML (ref 0.27–4.2)
WBC # BLD: 5.5 K/CU MM (ref 4–10.5)

## 2023-03-18 PROCEDURE — 85025 COMPLETE CBC W/AUTO DIFF WBC: CPT

## 2023-03-18 PROCEDURE — 84443 ASSAY THYROID STIM HORMONE: CPT

## 2023-03-18 PROCEDURE — 99284 EMERGENCY DEPT VISIT MOD MDM: CPT

## 2023-03-18 PROCEDURE — 93005 ELECTROCARDIOGRAM TRACING: CPT | Performed by: EMERGENCY MEDICINE

## 2023-03-18 PROCEDURE — 82140 ASSAY OF AMMONIA: CPT

## 2023-03-18 PROCEDURE — 82805 BLOOD GASES W/O2 SATURATION: CPT

## 2023-03-18 PROCEDURE — 80053 COMPREHEN METABOLIC PANEL: CPT

## 2023-03-18 PROCEDURE — 82962 GLUCOSE BLOOD TEST: CPT

## 2023-03-18 NOTE — ED PROVIDER NOTES
EMERGENCY DEPARTMENT ENCOUNTER      CHIEF COMPLAINT:   ***    HPI: Camilla Tian is a 78 y.o. male who presents ***    REVIEW OF SYSTEMS:   Constitutional:  Denies fever or chills  Eyes:  Denies change in visual acuity  HENT:  Denies nasal congestion or sore throat  Respiratory:  Denies cough or shortness of breath  Cardiovascular:  Denies chest pain or edema  GI:  Denies abdominal pain, nausea, vomiting, bloody stools or diarrhea  :  Denies dysuria  Musculoskeletal:  Denies back pain or joint pain  Integument:  Denies rash  Neurologic:  Denies headache, focal weakness or sensory changes  \"Remaining review of systems reviewed and negative. I have reviewed the nursing triage documentation and agree unless otherwise noted below. 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 Initially treated with vancomycin but had a supratherapeutic level, hence, it was substituted by daptomycin. Cumulative 6-week course of therapy will be completed on 3/13/2023. Anxiety     Chronic heart failure, unspecified heart failure type ([facility] 2/8/2023    Cirrhosis, alcoholic (HCC)     Diabetes mellitus ([facility]     GERD (gastroesophageal reflux disease) 2000    GERD (gastroesophageal reflux disease)     Hyperlipidemia     Hypertension     Portal hypertension (HCC)     Pulmonary nodules     Secondary esophageal varices without bleeding (HCC)     Sleep apnea     SOB (shortness of breath)     Thyroid disease        CURRENT MEDICATIONS:   Home medications reviewed. SURGICAL HISTORY:   Past Surgical History:   Procedure Laterality Date    APPENDECTOMY      CHOLECYSTECTOMY      COLONOSCOPY      ENDOSCOPY, COLON, DIAGNOSTIC      FOOT SURGERY      needle removed,not sure which foot, per wife.     TONSILLECTOMY      UPPER GASTROINTESTINAL ENDOSCOPY N/A 1/19/2022    EGD BIOPSY performed by Maylin Robertson MD at [facility] ENDOSCOPY    VASECTOMY         FAMILY HISTORY:   Family History   Problem Relation Age of Onset    Hypertension Brother     Diabetes Other        SOCIAL HISTORY:   Social History     Socioeconomic History    Marital status:      Spouse name: Not on file    Number of children: Not on file    Years of education: Not on file    Highest education level: Not on file   Occupational History     Comment: Retired    Tobacco Use    Smoking status: Former     Packs/day: 1.00     Years: 14.00     Pack years: 14.00     Types: Cigarettes     Quit date:      Years since quittin.2    Smokeless tobacco: Never   Vaping Use    Vaping Use: Never used   Substance and Sexual Activity    Alcohol use: Not Currently    Drug use: Never    Sexual activity: Yes     Partners: Female   Other Topics Concern    Not on file   Social History Narrative    Not on file     Social Determinants of Health     Financial Resource Strain: Not on file   Food Insecurity: Not on file   Transportation Needs: Not on file   Physical Activity: Not on file   Stress: Not on file   Social Connections: Not on file   Intimate Partner Violence: Not on file   Housing Stability: Not on file       ALLERGIES: Lisinopril, Zetia [ezetimibe], Atorvastatin, Codeine, Hydrochlorothiazide, Hydroxyzine, Lexapro [escitalopram oxalate], Pravastatin, and Daptomycin    PHYSICAL EXAM:  VITAL SIGNS:   ED Triage Vitals [23 1740]   Enc Vitals Group      /77      Heart Rate 70      Resp 16      Temp 98 °F (36.7 °C)      Temp Source Oral      SpO2 99 %      Weight       Height       Head Circumference       Peak Flow       Pain Score       Pain Loc       Pain Edu? Excl. in [address]? Constitutional:  Non-toxic appearance  HENT: Normocephalic, Atraumatic, Bilateral external ears normal, Oropharynx moist, No oral exudates, Nose normal.  Eyes:  PERRL, EOMI, Conjunctiva normal, No discharge.   Neck: Normal range of motion, No tenderness, Supple, No stridor, No lymphadenopathy. Cardiovascular:  Normal heart rate, Normal rhythm  Pulmonary/Chest:  Normal breath sounds, No respiratory distress, No wheezing  Abdomen: Bowel sounds normal, Soft, No tenderness, No masses, No pulsatile masses  Back:  No tenderness, No CVA tenderness  Extremities:  Normal range of motion, Intact distal pulses, No edema, No tenderness  Neurologic:  Alert & oriented x 3, Normal motor function, Sensation intact to light touch throughout, No focal deficits  Skin:  Warm, Dry, No erythema, No rash      EKG Interpretation  Interpreted by me  Compared to 3/10/2023  Rhythm: normal sinus  Rate: normal 69  Axis: normal  Ectopy: none  Conduction: normal  ST Segments: no acute change  T Waves: no acute change  Clinical Impression: normal sinu s rhythm, no acute change    Cardiac Monitor Strip Interpretation  Interpreted by me  Monitor strip interpreted for greater than 10 seconds  Rhythm: normal sinus  Rate: normal  Ectopy: none  ST Segments: normal      Radiology / Procedures:  ***      ED COURSE & MEDICAL DECISION MAKING:  Carolynn Oliver is a 78 y.o. male who presents to the Emergency Department complaining of ***. Please see HPI for details. {History from (Optional):92926}    {Limitations to history (Optional):33870}    Chronic conditions affecting care: ***    {Social Determinants (Optional):55366}    {Records Reviewed (Optional):85118}    On exam, the patient is afebrile and nontoxic appearing. *** is hemodynamically stable and neurologically intact. Physical exam is significant for ***. EKG shows a normal sinus rhythm with no ST elevation or depression. Labs are obtained and are significant for ***. Imaging was obtained and shows ***. The patient was treated with medications as below and felt significantly better.      Patient was given the following medications:  Medications - No data to display    Independent Imaging Interpretation by me: ***    Diagnosis and Differential Diagnosis:  I suspect that the patient has ***. I have a low suspicion for ***. Disposition Considerations (tests considered but not done, Shared Decision Making, Pt Expectation of Test or Tx.): ***  {Escalation of care, including admission/OBS considered:34491}    I feel that the patient is stable for outpatient management with follow up in 2-3 days. Prescriptions for ***will be prescribed as below. The patient is given return precautions. The patient verbalized understanding, was agreeable with plan, and the patient was discharged home in stable condition. I recommended admission to the hospital and the patient was agreeable. I discussed the case with Dr. Comfort Luna who will admit the patient for further treatment and care. The patient is currently in stable condition awaiting admission. I am the Primary Clinician of Record. Clinical Impression:  No diagnosis found. Disposition referral (if applicable):  No follow-up provider specified. Disposition medications (if applicable):  New Prescriptions    No medications on file         Comment: Please note this report has been produced using speech recognition software and may contain errors related to that system including errors in grammar, punctuation, and spelling, as well as words and phrases that may be inappropriate. If there are any questions or concerns please feel free to contact the dictating provider for clarification. Secondary esophageal varices without bleeding (HCC)     Sleep apnea     SOB (shortness of breath)     Thyroid disease        Social Determinants : None    Records Reviewed : None    On exam, the patient is afebrile and nontoxic appearing. He is hemodynamically stable and neurologically intact. Physical exam is significant for ascites with no abdominal tenderness to palpation. EKG shows a normal sinus rhythm with no ST elevation or depression. Labs are obtained and are significant for chronic kidney disease, mild respiratory acidosis. And chronic anemia. Ammonia is 54. There are no other clinically significant lab abnormalities. Patient was given the following medications:  Medications - No data to display    Diagnosis and Differential Diagnosis:  The etiology of the patient's fatigue is not entirely clear. I have a low suspicion for hypercapnia, hepatic encephalopathy, myxedema coma, spontaneous bacterial peritonitis or sepsis. Disposition Considerations:  I feel that the patient is stable for outpatient management with follow up in 2-3 days. The patient is given return precautions. The patient verbalized understanding, was agreeable with plan, and the patient was discharged home in stable condition. I am the Primary Clinician of Record. Clinical Impression:  1. Fatigue, unspecified type    2.  Other ascites        Disposition referral (if applicable):  Delvin Dykes MD  [address] [place], [place] [phone]    Schedule an appointment as soon as possible for a visit       [facility] Emergency Department  [place] [id number] [phone]  Go to   If symptoms worsen    Disposition medications (if applicable):  Discharge Medication List as of 3/18/2023  9:24 PM            Comment: Please note this report has been produced using speech recognition software and may contain errors related to that system

## 2023-03-18 NOTE — ED NOTES
Pt to ED for increasing fatigue since paracentesis done on last Tuesday. Per wife, pt had a EVA done yesterday and was also given a sedative for that. Has slept quite a bit since the EVA.      Elly Marie RN  03/18/23 4731

## 2023-03-19 LAB
EKG ATRIAL RATE: 69 BPM
EKG DIAGNOSIS: NORMAL
EKG P AXIS: -13 DEGREES
EKG P-R INTERVAL: 152 MS
EKG Q-T INTERVAL: 418 MS
EKG QRS DURATION: 72 MS
EKG QTC CALCULATION (BAZETT): 447 MS
EKG R AXIS: 33 DEGREES
EKG T AXIS: 24 DEGREES
EKG VENTRICULAR RATE: 69 BPM

## 2023-03-19 PROCEDURE — 93010 ELECTROCARDIOGRAM REPORT: CPT | Performed by: INTERNAL MEDICINE

## 2023-03-20 ENCOUNTER — CARE COORDINATION (OUTPATIENT)
Dept: CASE MANAGEMENT | Age: 78
End: 2023-03-20

## 2023-03-20 NOTE — CARE COORDINATION
[facility] ED Follow Up Call    3/20/2023    Patient: Marybeth Stroud Patient : 1945   MRN: [id number]  Reason for Admission: Fatigue  Discharge Date: 3/18/23 [facility] ED      Care Transitions ED Follow Up    Care Transitions Interventions   Home Care Waiver: Completed        Transportation Support: Declined      DME Assistance: Declined     Senior Services: Declined    Schedule Follow Up Appointment with Physician: Completed  Do you have any ongoing symptoms?: Yes   Onset of Patient-reported symptoms: Other   Did you call your PCP prior to going to the ED?: No - Did not call PCP   Do you have a copy of your discharge instructions?: Yes   Do you understand what to report and when to return?: Yes   Are you following your discharge instructions?: Yes   Do you have all of your prescriptions and are they filled?: Yes   Have you scheduled your follow up appointment?: No (Comment: Agreeable for CT staff to schedule)   Were you discharged with any Home Care or Post Acute Services or do you currently have any active services?: Yes   Post Acute Services: Home Health (Comment: [id number])         Do you have any needs or concerns that I can assist you with?: Yes   Identified Barriers: Other, Stress           Challenges to be reviewed by the provider   Additional needs identified to be addressed with provider:    Wife reports since EVA on 3/17/23 Patient w/ significant fatigue, sleeping all the time. Please further advise Patient. Method of communication with provider :  call to Dr Joel Orellana office, see below    [facility] w/ Wife as Patient sleeping. Reports since EVA on 3/17/23 Patient w/ significant fatigue, sleeping all the time. Wife very concerned and took Patient to ED for sx on 3/18/23---MD noted pending. Wife reports nothing found. Reports Patient weight 222.8# today which is slightly up from 222# on 3/16/22 and 220# 3/10/23. Reports Patient's abdomen again distended, \"huge\".  Denies noted or Patient

## 2023-03-21 ENCOUNTER — CARE COORDINATION (OUTPATIENT)
Dept: CASE MANAGEMENT | Age: 78
End: 2023-03-21

## 2023-03-21 ENCOUNTER — HOSPITAL ENCOUNTER (OUTPATIENT)
Dept: INTERVENTIONAL RADIOLOGY/VASCULAR | Age: 78
Discharge: HOME | End: 2023-03-21
Payer: COMMERCIAL

## 2023-03-21 VITALS
OXYGEN SATURATION: 98 % | DIASTOLIC BLOOD PRESSURE: 83 MMHG | WEIGHT: 220 LBS | HEIGHT: 68 IN | HEART RATE: 70 BPM | RESPIRATION RATE: 16 BRPM | TEMPERATURE: 97.1 F | BODY MASS INDEX: 33.34 KG/M2 | SYSTOLIC BLOOD PRESSURE: 117 MMHG

## 2023-03-21 DIAGNOSIS — R18.8 OTHER ASCITES: ICD-10-CM

## 2023-03-21 LAB
APTT: 22 SECONDS (ref 25.1–37.1)
FLUID TYPE: NORMAL INDEX
INR BLD: 0.98 INDEX
LYMPHOCYTES, BODY FLUID: 27 %
MESOTHELIAL FLUID: 7 /100 WBC
MONOCYTE, FLUID: NORMAL %
NEUTROPHIL, FLUID: 40 %
OTHER CELLS FLUID: NORMAL
PROTHROMBIN TIME: 12.7 SECONDS (ref 11.7–14.5)
RBC FLUID: 318 /CU MM
WBC FLUID: 63 /CU MM

## 2023-03-21 PROCEDURE — 6360000002 HC RX W HCPCS

## 2023-03-21 PROCEDURE — P9047 ALBUMIN (HUMAN), 25%, 50ML: HCPCS | Performed by: SPECIALIST

## 2023-03-21 PROCEDURE — 85610 PROTHROMBIN TIME: CPT

## 2023-03-21 PROCEDURE — 2709999900 IR US GUIDED PARACENTESIS

## 2023-03-21 PROCEDURE — P9047 ALBUMIN (HUMAN), 25%, 50ML: HCPCS

## 2023-03-21 PROCEDURE — 7100000011 HC PHASE II RECOVERY - ADDTL 15 MIN

## 2023-03-21 PROCEDURE — 7100000010 HC PHASE II RECOVERY - FIRST 15 MIN

## 2023-03-21 PROCEDURE — 49083 ABD PARACENTESIS W/IMAGING: CPT

## 2023-03-21 PROCEDURE — 6360000002 HC RX W HCPCS: Performed by: SPECIALIST

## 2023-03-21 PROCEDURE — 87205 SMEAR GRAM STAIN: CPT

## 2023-03-21 PROCEDURE — 87070 CULTURE OTHR SPECIMN AEROBIC: CPT

## 2023-03-21 PROCEDURE — 87075 CULTR BACTERIA EXCEPT BLOOD: CPT

## 2023-03-21 PROCEDURE — 89051 BODY FLUID CELL COUNT: CPT

## 2023-03-21 PROCEDURE — 85730 THROMBOPLASTIN TIME PARTIAL: CPT

## 2023-03-21 RX ORDER — ALBUMIN (HUMAN) 12.5 G/50ML
SOLUTION INTRAVENOUS CONTINUOUS PRN
Status: COMPLETED | OUTPATIENT
Start: 2023-03-21 | End: 2023-03-21

## 2023-03-21 RX ORDER — SODIUM CHLORIDE 0.9 % (FLUSH) 0.9 %
10 SYRINGE (ML) INJECTION PRN
Status: DISCONTINUED | OUTPATIENT
Start: 2023-03-21 | End: 2023-03-22 | Stop reason: HOSPADM

## 2023-03-21 RX ADMIN — ALBUMIN (HUMAN) 12.5 G: 0.25 INJECTION, SOLUTION INTRAVENOUS at 10:38

## 2023-03-21 ASSESSMENT — PAIN SCALES - GENERAL: PAINLEVEL_OUTOF10: 0

## 2023-03-21 ASSESSMENT — PAIN - FUNCTIONAL ASSESSMENT: PAIN_FUNCTIONAL_ASSESSMENT: 0-10

## 2023-03-21 NOTE — PROGRESS NOTES
Returned to room 3. Report received from IR nurse. Alert and oriented. Respirations even and unlabored. Color pink. Dressing to right side abdomen is dry and intact. No swelling noted around site. Abdomen soft. Beverage provided. Call light in reach. Wife supportive at bedside.

## 2023-03-21 NOTE — DISCHARGE INSTRUCTIONS
[facility]    Patient Discharge Instructions Abdominal Fluid Drainage      You have had had an Abdominal Fluid Drainage in the Radiology Department. Below are guidelines for you to follow after your test is completed:    Go home and rest quietly for the remainder of the day. DO NOT drive, operate appliances, or make any legal decisions today. You may resume normal activities tomorrow. Have a responsible adult drive you home and remain with you for the remainder of the day. You may resume your normal diet after the procedure. Avoid alcoholic beverages and depressant drugs for 24 hours. You may resume your previous medications unless directed not to by your physician or the Radiologist. Archie Holes may use Tylenol (one or two tablets every four to six hours) for discomfort at the puncture site. If you develop severe pain, swelling, or redness at the site, call the Radiology Department. Call your physician immediately if you develop a rapid pulse (greater than 100 beats per minute), feel faint, sweat profusely, experience a sudden onset of weakness, or experience increased pain or swelling or saturation of dressing at the puncture site. Call your physician immediately if you develop a sudden onset of rapid breathing (greater than 20 breaths per minute), upper back or chest pain, shortness of breath, sweating, skin color change, or a sudden onset of anxiety. Report a temperature greater than 101 degrees Fahrenheit (38.8 degrees Celsius) to us or to your physician. Check the dressing throughout the day for an increase in drainage. Keep the dressing dry for 24 hours. Replace with a Band-Aid if necessary. You may shower 24 hours after the procedure. Do not smoke for 24 hours after the procedure. Call your physician for a follow-up appointment.   If any questions or complications develop after you go home, please call the Radiology Department at [phone]

## 2023-03-21 NOTE — PROGRESS NOTES
1108- Discharge instructions provided to patient and spouse. Understanding voiced. 1119- Out to car per wheelchair. Home with wife.

## 2023-03-21 NOTE — OR NURSING
PROCEDURE PERFORMED: paracentesis    PRIMARY INDICATION FOR PROCEDURE: ascites    INFORMED CONSENT:  Obtained prior to procedure. Consent placed in chart. PT TRANSPORTED FROM:  [facility]                                  TO THE IR ROOM:   Small                     ASSESSMENT: Pt Eyak @ baseline, but alert & oriented on RA. BARRIER PRECAUTIONS & STERILE TECHNIQUE:               Pt positioned supine for comfort. Warm blankets given. Pt placed on Cardiac Monitor. Pt and draped in a sterile fashion with chlorhexadine. PAIN/LOCAL ANESTHESIA/SEDATION MANAGEMENT:           Local: Lidocaine 1% given by Dr. Jackson North:           ACCESS TIME: 1005          US/FLUORO: US 2 images          CATHETER USED: OneStep          STERILE DRESSINGS: guaze & tegaderm    SPECIMENS:  Xx cc thick creamy ascitic fluid removed; 1100cc sent to lab     EBL: <1cc         FOLLOW- UP X-RAY: None    COMPLICATIONS/ OUTCOME: IV started in SDS infiltrated. Attempts x4 for new PIV failed. Pts VSS. 12.5g of albumin administered.      REPORT CALLED TO: [facility] bedside

## 2023-03-21 NOTE — CARE COORDINATION
services  Interventions to address risk factors:  discussed importance of 3/22/23 cardiology appt given ongoing sx    Offered patient enrollment in the Remote Patient Monitoring (RPM) program for in-home monitoring:  previously declined . Care Transitions Subsequent and Final Call    Schedule Follow Up Appointment with PCP: Declined  Subsequent and Final Calls  Do you have any ongoing symptoms?: Yes  Onset of Patient-reported symptoms: Other  Patient-reported symptoms: Fatigue  Interventions for patient-reported symptoms: Other  Have your medications changed?: No  Do you have any questions related to your medications?: No  Do you currently have any active services?: Yes  Are you currently active with any services?: Home Health  Do you have any needs or concerns that I can assist you with?: No  Identified Barriers: Other  Care Transitions Interventions   Home Care Waiver: Completed        Transportation Support: Declined      DME Assistance: Declined     Senior Services: Declined    Other Interventions:             Care Transition Nurse provided contact information for future needs. Plan for follow-up call in 5-7 days based on severity of symptoms and risk factors.   Plan for next call: symptom management-sob, fatigue, cardiac sx?  self management-wt?  follow-up appointment-f/u on cardiology appt  [facility]  referrals-RPM declined    Harriet Jain RN

## 2023-03-24 LAB
CULTURE: ABNORMAL
GRAM SMEAR: ABNORMAL
Lab: ABNORMAL
SPECIMEN: ABNORMAL

## 2023-03-26 LAB
CULTURE: NORMAL
Lab: NORMAL
SPECIMEN: NORMAL

## 2023-03-29 ENCOUNTER — CARE COORDINATION (OUTPATIENT)
Dept: CARE COORDINATION | Age: 78
End: 2023-03-29

## 2023-03-29 ENCOUNTER — CARE COORDINATION (OUTPATIENT)
Dept: CASE MANAGEMENT | Age: 78
End: 2023-03-29

## 2023-03-29 NOTE — CARE COORDINATION
[facility] Care Transitions Follow Up Call    Patient: Juanjo Hair  Patient : 1945   MRN: [id number]  Reason for Admission: Decompensated Alcohol Cirrhosis, Hepatic Encephalopathy  Discharge Date: 3/18/23 RARS: Readmission Risk Score: 33.8      Attempted to contact patient for follow up transition call. Unable to leave a voicemail message to return call. Received message stating the wireless customer is unavailable. Try again later. Will continue to follow.       Follow Up  Future Appointments   Date Time Provider Lucita Murphy   2023 11:30 AM SPECIALS ROOM 02 [address] [facility] Radiolo   2023 11:30 AM Chandan Monzon MD [facility] Gastro MMA   2023  2:45 PM Guillermo Livingston MD [facility] FPS MMA   2023  2:10 PM Ayana Robin MD [facility] Heart MMA         Care Transitions Subsequent and Final Call    Subsequent and Final Calls  Are you currently active with any services?: Home Health  Care Transitions Interventions   Home Care Waiver: Completed        Transportation Support: Declined      DME Assistance: Declined     Senior Services: Declined    Other Interventions:               Tee Noland LPN

## 2023-03-29 NOTE — CARE COORDINATION
based on symptoms and resources available to patient including: PCP  Specialist  Urgent care clinics  When to call 911. The family agrees to contact the PCP office for questions related to their healthcare. Advance Care Planning:   not on file. Patients top risk factors for readmission: medical condition- Decompensated Alcohol Cirrhosis, Hepatic Encephalopathy  Interventions to address risk factors: Assessment and support for treatment adherence and medication management-denied new or changed meds    Offered patient enrollment in the Remote Patient Monitoring (RPM) program for in-home monitoring: NA.     Care Transitions Subsequent and Final Call    Subsequent and Final Calls  Do you have any ongoing symptoms?: No  Have your medications changed?: No  Do you have any questions related to your medications?: No  Do you currently have any active services?: Yes  Are you currently active with any services?: Home Health  Do you have any needs or concerns that I can assist you with?: No  Identified Barriers: None  Care Transitions Interventions   Home Care Waiver: Completed        Transportation Support: Declined      DME Assistance: Declined     Senior Services: Declined    Other Interventions:             LPN Care Coordinator provided contact information for future needs. Plan for follow-up call in 7-10 days based on severity of symptoms and risk factors.   Plan for next call: symptom management-SOB, CP, abd pain, abd distention  self management-weight, BP  follow-up appointment-4/18 GI, 4/28 GI  medication management-new or changed meds    Yu Okeefe LPN

## 2023-03-30 NOTE — PROGRESS NOTES
IR Procedure at [facility]:  Left message for patient to arrive at 1000 on 4/4//2023 for his procedure at 1130. Patient has been given below instructions. (Paracentesis, Thoracentesis, Thyroid Bx and Injections may have a light breakfast)  2. Follow your directions as prescribed by the doctor for your procedure and medications. 3.   Consult your provider as to when to stop blood thinner  4. Do not take any pain medication within 6 hours of your procedure  5. Do not drink any alcoholic beverages or use any street drugs 24 hours before procedure. 6.   Please wear simple, loose fitting clothing to the hospital.  Do not bring valuables (money,             credit cards, checkbooks, etc.)     7. If you  have a Living Will and Durable Power of  for Healthcare, please bring in a copy. 8.   Please bring picture ID,  insurance card, paperwork from the doctors office            (H & P, Consent,  & card for implantable devices). 9.   Report to the information desk on the ground floor. 10. Take a shower the night before or morning of your procedure, do not apply any lotion, oil or powder. 11. If you are going to be sedated for the procedure, you will need a responsible adult to drive you home.

## 2023-04-03 ENCOUNTER — TELEPHONE (OUTPATIENT)
Dept: GASTROENTEROLOGY | Age: 78
End: 2023-04-03

## 2023-04-03 ENCOUNTER — TELEPHONE (OUTPATIENT)
Dept: FAMILY MEDICINE CLINIC | Age: 78
End: 2023-04-03

## 2023-04-03 NOTE — TELEPHONE ENCOUNTER
Aspen from [facility] called and stated that patient will have his stomach drained 4-3-23. Patient has relief for one week after the procedure and has very low energy. Patient wants to know if he can take Tylenol OTC for H/A and body pains. See patients liver function test.Patient stomach and legs are tight.   Call patient and Aspen concerning the Tylenol

## 2023-04-03 NOTE — TELEPHONE ENCOUNTER
Home compa nurse maira called in stating that pt is currently getting paracentesis every 2wks but in between those he is swelling and filling up with fluid very rapidly and gaining large a significant amount of weight in the time frame. He is also experiencing SOB. She wanted to discuss if a weekly paracentesis would be beneficial. I move pt f/u appt up from 4/28/23 to 4/13/23 @1:30.  Please advise: Gio Clarke

## 2023-04-04 ENCOUNTER — HOSPITAL ENCOUNTER (OUTPATIENT)
Dept: INTERVENTIONAL RADIOLOGY/VASCULAR | Age: 78
Discharge: HOME | End: 2023-04-04
Payer: COMMERCIAL

## 2023-04-04 VITALS
TEMPERATURE: 97.6 F | HEART RATE: 96 BPM | BODY MASS INDEX: 34.56 KG/M2 | WEIGHT: 228 LBS | SYSTOLIC BLOOD PRESSURE: 85 MMHG | RESPIRATION RATE: 18 BRPM | OXYGEN SATURATION: 97 % | DIASTOLIC BLOOD PRESSURE: 57 MMHG | HEIGHT: 68 IN

## 2023-04-04 DIAGNOSIS — R18.8 OTHER ASCITES: ICD-10-CM

## 2023-04-04 DIAGNOSIS — R18.8 OTHER ASCITES: Primary | ICD-10-CM

## 2023-04-04 LAB
FLUID TYPE: NORMAL INDEX
LYMPHOCYTES, BODY FLUID: 26 %
MESOTHELIAL FLUID: 9 /100 WBC
MONOCYTE, FLUID: NORMAL %
NEUTROPHIL, FLUID: 38 %
OTHER CELLS FLUID: NORMAL
RBC FLUID: 125 /CU MM
WBC FLUID: 113 /CU MM

## 2023-04-04 PROCEDURE — P9047 ALBUMIN (HUMAN), 25%, 50ML: HCPCS | Performed by: NURSE PRACTITIONER

## 2023-04-04 PROCEDURE — 87075 CULTR BACTERIA EXCEPT BLOOD: CPT

## 2023-04-04 PROCEDURE — 87205 SMEAR GRAM STAIN: CPT

## 2023-04-04 PROCEDURE — P9047 ALBUMIN (HUMAN), 25%, 50ML: HCPCS

## 2023-04-04 PROCEDURE — 49083 ABD PARACENTESIS W/IMAGING: CPT

## 2023-04-04 PROCEDURE — 6360000002 HC RX W HCPCS

## 2023-04-04 PROCEDURE — 7100000010 HC PHASE II RECOVERY - FIRST 15 MIN

## 2023-04-04 PROCEDURE — 7100000011 HC PHASE II RECOVERY - ADDTL 15 MIN

## 2023-04-04 PROCEDURE — 87070 CULTURE OTHR SPECIMN AEROBIC: CPT

## 2023-04-04 PROCEDURE — 2709999900 IR US GUIDED PARACENTESIS

## 2023-04-04 PROCEDURE — 89051 BODY FLUID CELL COUNT: CPT

## 2023-04-04 PROCEDURE — 6360000002 HC RX W HCPCS: Performed by: NURSE PRACTITIONER

## 2023-04-04 PROCEDURE — 2580000003 HC RX 258

## 2023-04-04 RX ORDER — SODIUM CHLORIDE 0.9 % (FLUSH) 0.9 %
10 SYRINGE (ML) INJECTION PRN
Status: DISCONTINUED | OUTPATIENT
Start: 2023-04-04 | End: 2023-04-05 | Stop reason: HOSPADM

## 2023-04-04 RX ORDER — ALBUMIN (HUMAN) 12.5 G/50ML
SOLUTION INTRAVENOUS CONTINUOUS PRN
Status: COMPLETED | OUTPATIENT
Start: 2023-04-04 | End: 2023-04-04

## 2023-04-04 RX ADMIN — ALBUMIN (HUMAN) 12.5 G: 0.25 INJECTION, SOLUTION INTRAVENOUS at 11:40

## 2023-04-04 RX ADMIN — ALBUMIN (HUMAN) 62.5 G: 0.25 INJECTION, SOLUTION INTRAVENOUS at 11:27

## 2023-04-04 ASSESSMENT — PAIN SCALES - GENERAL: PAINLEVEL_OUTOF10: 0

## 2023-04-04 NOTE — OR NURSING
PROCEDURE PERFORMED: paracentesis    PRIMARY INDICATION FOR PROCEDURE: ascites    INFORMED CONSENT:  Obtained prior to procedure. Consent placed in chart. PT TRANSPORTED FROM:  [facility]                                  TO THE IR ROOM:  Small                      ASSESSMENT: Pt alert & oriented on RA. Able to move all extremities freely    [address]:               Pt transferred to the table and positioned for comfort. Warm blankets given. Pt placed on Cardiac Monitor. Pt prepped and draped in a sterile fashion with chlorhexadine. PAIN/LOCAL ANESTHESIA/SEDATION MANAGEMENT:           Local: Lidocaine 1% given by Dr. Azeem Linares:           ACCESS TIME: 7266          US/FLUORO: US 2 images           CATHETER USED: 6Fr [employer]           STERILE DRESSINGS: guaze & tegaderm    SPECIMENS: 13,100cc cloudy yellow ascitic fluid removed & 1100cc sent to lab per order    EBL:   <1cc       FOLLOW- UP X-RAY: None    COMPLICATIONS/ OUTCOME: VSS. 75g of 25% albumin administered IV.      REPORT CALLED TO: Bedside [facility]

## 2023-04-04 NOTE — PROGRESS NOTES
1210:  Discharge instructions discussed with patient and spouse, both verbalized an understanding. 1215:  Pt discharged to home alert and oriented with no c/o right flank discomfort. Right flank puncture site covered with gauze/opsite, no bleeding noted. Pt tolerating PO, VSS.

## 2023-04-04 NOTE — TELEPHONE ENCOUNTER
Left message for Aspen to return call. Jaxson Arteaga, [facility] main number per Dr. Dennis Potts note: Use Tylenol 500 mg no more than 3/day. She voiced understanding.

## 2023-04-05 ENCOUNTER — CARE COORDINATION (OUTPATIENT)
Dept: CASE MANAGEMENT | Age: 78
End: 2023-04-05

## 2023-04-05 NOTE — CARE COORDINATION
[facility] Care Transitions Follow Up Call    Patient Current Location:  Home: [place] [place] [id number]    [facility] Care Coordinator contacted the family by telephone to follow up after admission on 2/10/2023. Verified name and  with family as identifiers. Patient: Yu Espino  Patient : 1945   MRN: <[id number]>  Reason for Admission: Decompensated Alcohol Cirrhosis, Hepatic Encephalopathy  Discharge Date: 3/18/23 RARS: Readmission Risk Score: 33.8      Needs to be reviewed by the provider   Additional needs identified to be addressed with provider: No  none             Method of communication with provider: none. Spoke with patients wife Petar Marrufo. She reports patient had a paracentesis yesterday and a lot of fluid was pulled off of him. He is pretty tired from that today. He is still resting. Appetite and fluid intake is good. Denies sob, cough, cp, weakness or fever. She hasn't checked his wt or SPO2 since he is still resting. No questions or needs. Will continue to follow. Addressed changes since last contact:  none  Discussed follow-up appointments. If no appointment was previously scheduled, appointment scheduling offered: na.   Is follow up appointment scheduled within 7 days of discharge? No.    Follow Up  Future Appointments   Date Time Provider Lucita Murphy   2023  1:30 PM Isa Rosenbaum MD  [facility]   2023 10:30 AM [facility] IR ROOM 1 [address] [facility]   2023 10:30 AM [facility] IR ROOM 1 [facility] Radiol[facility]   2023  2:45 PM Harper Alaniz MD [address] [facility]   2023  2:10 PM Hyacinth Abarca MD [facility] Heart [facility]     [facility] follow up appointment(s):     Advance Care Planning:   not on file.      Patients top risk factors for readmission: Decompensated Alcohol Cirrhosis, Hepatic Encephalopathy  Interventions to address risk factors: Education of patient/family/caregiver/guardian to support self-management-     Offered patient enrollment in

## 2023-04-07 LAB
CULTURE: ABNORMAL
GRAM SMEAR: ABNORMAL
Lab: ABNORMAL
SPECIMEN: ABNORMAL

## 2023-04-09 LAB
CULTURE: NORMAL
Lab: NORMAL
SPECIMEN: NORMAL

## 2023-04-14 RX ORDER — SODIUM CHLORIDE 0.9 % (FLUSH) 0.9 %
10 SYRINGE (ML) INJECTION PRN
OUTPATIENT
Start: 2023-04-14

## 2023-04-17 ENCOUNTER — HOSPITAL ENCOUNTER (OUTPATIENT)
Dept: INTERVENTIONAL RADIOLOGY/VASCULAR | Age: 78
Discharge: HOME | End: 2023-04-17
Payer: COMMERCIAL

## 2023-04-17 VITALS
HEIGHT: 68 IN | OXYGEN SATURATION: 99 % | BODY MASS INDEX: 37.89 KG/M2 | HEART RATE: 70 BPM | RESPIRATION RATE: 16 BRPM | WEIGHT: 250 LBS | SYSTOLIC BLOOD PRESSURE: 111 MMHG | DIASTOLIC BLOOD PRESSURE: 65 MMHG | TEMPERATURE: 97.8 F

## 2023-04-17 DIAGNOSIS — R18.8 OTHER ASCITES: ICD-10-CM

## 2023-04-17 LAB
FLUID TYPE: NORMAL INDEX
LYMPHOCYTES, BODY FLUID: 56 %
MESOTHELIAL FLUID: 1 /100 WBC
MONOCYTE, FLUID: 24 %
NEUTROPHIL, FLUID: 19 %
OTHER CELLS FLUID: 1
RBC FLUID: 145 /CU MM
WBC FLUID: 130 /CU MM

## 2023-04-17 PROCEDURE — P9047 ALBUMIN (HUMAN), 25%, 50ML: HCPCS

## 2023-04-17 PROCEDURE — 6360000002 HC RX W HCPCS

## 2023-04-17 PROCEDURE — 49083 ABD PARACENTESIS W/IMAGING: CPT

## 2023-04-17 PROCEDURE — 87205 SMEAR GRAM STAIN: CPT

## 2023-04-17 PROCEDURE — 6360000002 HC RX W HCPCS: Performed by: SPECIALIST

## 2023-04-17 PROCEDURE — 89051 BODY FLUID CELL COUNT: CPT

## 2023-04-17 PROCEDURE — 87070 CULTURE OTHR SPECIMN AEROBIC: CPT

## 2023-04-17 PROCEDURE — 2709999900 IR US GUIDED PARACENTESIS

## 2023-04-17 PROCEDURE — 87075 CULTR BACTERIA EXCEPT BLOOD: CPT

## 2023-04-17 PROCEDURE — P9047 ALBUMIN (HUMAN), 25%, 50ML: HCPCS | Performed by: SPECIALIST

## 2023-04-17 RX ORDER — ALBUMIN (HUMAN) 12.5 G/50ML
SOLUTION INTRAVENOUS CONTINUOUS PRN
Status: COMPLETED | OUTPATIENT
Start: 2023-04-17 | End: 2023-04-17

## 2023-04-17 RX ADMIN — ALBUMIN (HUMAN) 75 G: 0.25 INJECTION, SOLUTION INTRAVENOUS at 10:43

## 2023-04-17 ASSESSMENT — PAIN - FUNCTIONAL ASSESSMENT: PAIN_FUNCTIONAL_ASSESSMENT: 0-10

## 2023-04-17 ASSESSMENT — PAIN SCALES - GENERAL: PAINLEVEL_OUTOF10: 0

## 2023-04-17 NOTE — DISCHARGE INSTRUCTIONS
after blowing your nose, coughing, or sneezing; going to the bathroom; and before eating or preparing food. If soap and water are not readily available, use an alcohol-based hand  with at least 60% alcohol, covering all surfaces of your hands and rubbing them together until they feel dry. Soap and water are the best option if hands are visibly dirty. Avoid touching your eyes, nose, and mouth with unwashed hands. Avoid sharing personal household items  You should not share dishes, drinking glasses, cups, eating utensils, towels, or bedding with other people or pets in your home. After using these items, they should be washed thoroughly with soap and water. Clean all high-touch surfaces everyday  High touch surfaces include counters, tabletops, doorknobs, bathroom fixtures, toilets, phones, keyboards, tablets, and bedside tables. Also, clean any surfaces that may have blood, stool, or body fluids on them. Use a household cleaning spray or wipe, according to the label instructions. Labels contain instructions for safe and effective use of the cleaning product including precautions you should take when applying the product, such as wearing gloves and making sure you have good ventilation during use of the product. Monitor your symptoms  Seek prompt medical attention if your illness is worsening (e.g., difficulty breathing). Before seeking care, call your healthcare provider and tell them that you have, or are being evaluated for, COVID-19. Put on a facemask before you enter the facility. These steps will help the healthcare providers office to keep other people in the office or waiting room from getting infected or exposed. Ask your healthcare provider to call the local or [facility] health department. Persons who are placed under active monitoring or facilitated self-monitoring should follow instructions provided by their local health department or occupational health professionals, as appropriate.  When

## 2023-04-17 NOTE — PROGRESS NOTES
1050- Patient arrived back to [facility]. Report given to this nurse from [address] IR Patient A&O. Beverage of choice offered to patient. Call light in reach and bed in lowest position. 1057 IV removed. Discharge instructions given to  patient and his spouse both verbalized understanding. Patient sitting on side of bed getting dressed assisted by spouse. 46- Patient escorted to car via wheelchair transported home by spouse.

## 2023-04-17 NOTE — OR NURSING
PROCEDURE PREFORMED: PARACENTESIS                    PAIN/LOCAL ANESTHESIA MANAGEMENT:           Local: Lidocaine 1% given by DR. SNELL            INTRAOPERATIVE:           ACCESS TIME: 0958          US/FLUORO: 3 US Images          CATHETER USED: SafeT-Centesis    SPECIMENS:1020 cc cloudy cream asitic fluid sent to lab, 87687zi total     REPORT CALLED TO: SARANYA LAGUNAS

## 2023-04-17 NOTE — H&P
Vaping Use: Never used   Substance and Sexual Activity    Alcohol use: Not Currently    Drug use: Never    Sexual activity: Yes     Partners: Female   Other Topics Concern    Not on file   Social History Narrative    Not on file     Social Determinants of Health     Financial Resource Strain: Not on file   Food Insecurity: Not on file   Transportation Needs: Not on file   Physical Activity: Not on file   Stress: Not on file   Social Connections: Not on file   Intimate Partner Violence: Not on file   Housing Stability: Not on file       Family History:  Family History   Problem Relation Age of Onset    Hypertension Brother     Diabetes Other        Allergies:   Allergies   Allergen Reactions    Lisinopril Swelling     Tongue swelling      Zetia [Ezetimibe] Swelling     Facial swelling    Atorvastatin     Codeine Other (See Comments)     Blood pressure drops    Hydrochlorothiazide     Hydroxyzine      Fatigue and depressed    Lexapro [Escitalopram Oxalate]      Increased depression    Pravastatin     Daptomycin Rash and Other (See Comments)     Hands drawn involuntarily       Medications:  Current Outpatient Medications on File Prior to Encounter   Medication Sig Dispense Refill    albuterol sulfate HFA (PROVENTIL;VENTOLIN;PROAIR) 108 (90 Base) MCG/ACT inhaler INHALE 2 PUFFS BY MOUTH 4 TIMES DAILY AS NEEDED FOR WHEEZING 1 each 2    lactulose (CHRONULAC) 10 GM/15ML solution Take 30 mLs by mouth 3 times daily Hold if having diarrhea 2700 mL 5    spironolactone (ALDACTONE) 100 MG tablet Take 1 tablet by mouth 2 times daily 30 tablet 2    rifAXIMin (XIFAXAN) 550 MG tablet Take 1 tablet by mouth 2 times daily 42 tablet 0    furosemide (LASIX) 40 MG tablet Take 1 tablet by mouth daily 60 tablet 3    levothyroxine (SYNTHROID) 50 MCG tablet Take 1 tablet by mouth Daily 90 tablet 1    DULoxetine (CYMBALTA) 60 MG extended release capsule TAKE 1 CAPSULE BY MOUTH ONCE DAILY (Patient taking differently: Take 1 capsule by mouth

## 2023-04-19 ENCOUNTER — TELEPHONE (OUTPATIENT)
Dept: FAMILY MEDICINE CLINIC | Age: 78
End: 2023-04-19

## 2023-04-19 NOTE — TELEPHONE ENCOUNTER
Patients wife Lachelle Morel walked into office requesting a over ride for the limit of refills on Xifaxan - please call [employer] at [phone] option 1 then option 2. Please call patient when this is complete.

## 2023-04-19 NOTE — TELEPHONE ENCOUNTER
You check on this please. Have we been prescribing it or has had that been prescribed by GI medicine or the hospital when he has been in and out frequently?

## 2023-04-19 NOTE — TELEPHONE ENCOUNTER
Spoke to Dr. Sheila Fuller and Natalya Cabral per message. We have only prescribed this medication twice. Due to the cost and the fact that it is a gastro medication instructed wife to contact his gastro doctor. She voiced understanding.

## 2023-04-20 LAB
CULTURE: ABNORMAL
GRAM SMEAR: ABNORMAL
Lab: ABNORMAL
SPECIMEN: ABNORMAL

## 2023-04-22 LAB
CULTURE: NORMAL
Lab: NORMAL
SPECIMEN: NORMAL

## 2023-05-01 ENCOUNTER — HOSPITAL ENCOUNTER (OUTPATIENT)
Dept: INTERVENTIONAL RADIOLOGY/VASCULAR | Age: 78
Discharge: HOME | End: 2023-05-01
Payer: COMMERCIAL

## 2023-05-01 VITALS
RESPIRATION RATE: 20 BRPM | BODY MASS INDEX: 37.89 KG/M2 | SYSTOLIC BLOOD PRESSURE: 117 MMHG | HEIGHT: 68 IN | WEIGHT: 250 LBS | HEART RATE: 74 BPM | OXYGEN SATURATION: 100 % | DIASTOLIC BLOOD PRESSURE: 60 MMHG | TEMPERATURE: 96.3 F

## 2023-05-01 DIAGNOSIS — R18.8 OTHER ASCITES: ICD-10-CM

## 2023-05-01 LAB
FLUID TYPE: NORMAL INDEX
GLUCOSE BLD-MCNC: 111 MG/DL (ref 70–99)
LYMPHOCYTES, BODY FLUID: 30 %
MESOTHELIAL FLUID: 25 /100 WBC
MONOCYTE, FLUID: 8 %
NEUTROPHIL, FLUID: 19 %
OTHER CELLS FLUID: NORMAL
RBC FLUID: 14 /CU MM
WBC FLUID: 91 /CU MM

## 2023-05-01 PROCEDURE — 82962 GLUCOSE BLOOD TEST: CPT

## 2023-05-01 PROCEDURE — 49083 ABD PARACENTESIS W/IMAGING: CPT

## 2023-05-01 PROCEDURE — 87205 SMEAR GRAM STAIN: CPT

## 2023-05-01 PROCEDURE — 6360000002 HC RX W HCPCS: Performed by: SPECIALIST

## 2023-05-01 PROCEDURE — P9047 ALBUMIN (HUMAN), 25%, 50ML: HCPCS | Performed by: SPECIALIST

## 2023-05-01 PROCEDURE — 2580000003 HC RX 258

## 2023-05-01 PROCEDURE — 89051 BODY FLUID CELL COUNT: CPT

## 2023-05-01 PROCEDURE — C1729 CATH, DRAINAGE: HCPCS

## 2023-05-01 PROCEDURE — P9047 ALBUMIN (HUMAN), 25%, 50ML: HCPCS

## 2023-05-01 PROCEDURE — 87070 CULTURE OTHR SPECIMN AEROBIC: CPT

## 2023-05-01 PROCEDURE — 6360000002 HC RX W HCPCS

## 2023-05-01 RX ORDER — SODIUM CHLORIDE 0.9 % (FLUSH) 0.9 %
10 SYRINGE (ML) INJECTION PRN
Status: DISCONTINUED | OUTPATIENT
Start: 2023-05-01 | End: 2023-05-02 | Stop reason: HOSPADM

## 2023-05-01 RX ORDER — ALBUMIN (HUMAN) 12.5 G/50ML
SOLUTION INTRAVENOUS CONTINUOUS PRN
Status: COMPLETED | OUTPATIENT
Start: 2023-05-01 | End: 2023-05-01

## 2023-05-01 RX ADMIN — ALBUMIN (HUMAN) 75 G: 0.25 INJECTION, SOLUTION INTRAVENOUS at 11:20

## 2023-05-01 NOTE — FLOWSHEET NOTE
Pt to pt  in wheelchair per RN for d/c home in private vehicle with spouse.  Dressing to right abdomen dry and intact at time of d/c

## 2023-05-01 NOTE — FLOWSHEET NOTE
Discharge instructions reviewed with patient and spouse per JANNETH Gongora. Voices understanding.   Pt assisted with dressing and assisted to [facility] per RN staff

## 2023-05-01 NOTE — OR NURSING
PROCEDURE PERFORMED: paracentesis    PRIMARY INDICATION FOR PROCEDURE: ascites    INFORMED CONSENT:  Obtained prior to procedure. Consent placed in chart. PT TRANSPORTED FROM:   [place]                                 TO THE IR ROOM:   Small                     ASSESSMENT: Pt alert & oriented on RA. BARRIER PRECAUTIONS & STERILE TECHNIQUE:               Pt positioned supine for comfort. Warm blankets given. Pt placed on Cardiac Monitor. Pt prepped and draped in a sterile fashion with chlorhexadine.     PAIN/LOCAL ANESTHESIA/SEDATION MANAGEMENT:           Local: Lidocaine 1% given by Dr. [place]:           ACCESS TIME: 3795          US/FLUORO: US 2 images          CATHETER USED: 6Fr Safe-T-Centesis          STERILE DRESSINGS: guaze & tegaderm    SPECIMENS: 13,700cc white ascitic fluid removed; 1000cc sent to lab    EBL:    <1cc      FOLLOW- UP X-RAY: None    COMPLICATIONS/ OUTCOME: Pt tolerated well. 75g of 25% albumin administered IV    REPORT CALLED TO: Bedside [facility]

## 2023-05-04 LAB
CULTURE: ABNORMAL
GRAM SMEAR: ABNORMAL
Lab: ABNORMAL
SPECIMEN: ABNORMAL

## 2023-05-08 ENCOUNTER — OFFICE VISIT (OUTPATIENT)
Dept: FAMILY MEDICINE CLINIC | Age: 78
End: 2023-05-08
Payer: COMMERCIAL

## 2023-05-08 ENCOUNTER — HOSPITAL ENCOUNTER (OUTPATIENT)
Dept: INTERVENTIONAL RADIOLOGY/VASCULAR | Age: 78
Discharge: HOME | End: 2023-05-08
Payer: COMMERCIAL

## 2023-05-08 VITALS
DIASTOLIC BLOOD PRESSURE: 63 MMHG | OXYGEN SATURATION: 99 % | SYSTOLIC BLOOD PRESSURE: 121 MMHG | TEMPERATURE: 97.8 F | HEART RATE: 71 BPM | RESPIRATION RATE: 18 BRPM

## 2023-05-08 VITALS
DIASTOLIC BLOOD PRESSURE: 68 MMHG | HEIGHT: 68 IN | OXYGEN SATURATION: 98 % | WEIGHT: 211 LBS | SYSTOLIC BLOOD PRESSURE: 122 MMHG | BODY MASS INDEX: 31.98 KG/M2 | HEART RATE: 74 BPM

## 2023-05-08 DIAGNOSIS — R18.8 OTHER ASCITES: ICD-10-CM

## 2023-05-08 DIAGNOSIS — R42 DIZZINESS: ICD-10-CM

## 2023-05-08 DIAGNOSIS — E11.9 TYPE 2 DIABETES MELLITUS WITHOUT COMPLICATION, WITHOUT LONG-TERM CURRENT USE OF INSULIN (HCC): ICD-10-CM

## 2023-05-08 DIAGNOSIS — K70.31 ALCOHOLIC CIRRHOSIS OF LIVER WITH ASCITES (HCC): ICD-10-CM

## 2023-05-08 DIAGNOSIS — H91.90 HEARING LOSS, UNSPECIFIED HEARING LOSS TYPE, UNSPECIFIED LATERALITY: Primary | ICD-10-CM

## 2023-05-08 LAB
FLUID TYPE: NORMAL INDEX
LYMPHOCYTES, BODY FLUID: 34 %
MESOTHELIAL FLUID: 3 /100 WBC
MONOCYTE, FLUID: 40 %
NEUTROPHIL, FLUID: 17 %
OTHER CELLS FLUID: NORMAL
RBC FLUID: 26 /CU MM
WBC FLUID: 119 /CU MM

## 2023-05-08 PROCEDURE — 1124F ACP DISCUSS-NO DSCNMKR DOCD: CPT | Performed by: FAMILY MEDICINE

## 2023-05-08 PROCEDURE — 3074F SYST BP LT 130 MM HG: CPT | Performed by: FAMILY MEDICINE

## 2023-05-08 PROCEDURE — P9047 ALBUMIN (HUMAN), 25%, 50ML: HCPCS | Performed by: SPECIALIST

## 2023-05-08 PROCEDURE — 49083 ABD PARACENTESIS W/IMAGING: CPT

## 2023-05-08 PROCEDURE — 89051 BODY FLUID CELL COUNT: CPT

## 2023-05-08 PROCEDURE — P9047 ALBUMIN (HUMAN), 25%, 50ML: HCPCS

## 2023-05-08 PROCEDURE — 6360000002 HC RX W HCPCS

## 2023-05-08 PROCEDURE — 91312 COVID-19, PFIZER BIVALENT BOOSTER, DO NOT DILUTE, (AGE 12Y+), IM, 30 MCG/0.3 ML: CPT | Performed by: FAMILY MEDICINE

## 2023-05-08 PROCEDURE — 99213 OFFICE O/P EST LOW 20 MIN: CPT | Performed by: FAMILY MEDICINE

## 2023-05-08 PROCEDURE — 2580000003 HC RX 258

## 2023-05-08 PROCEDURE — 3078F DIAST BP <80 MM HG: CPT | Performed by: FAMILY MEDICINE

## 2023-05-08 PROCEDURE — 87070 CULTURE OTHR SPECIMN AEROBIC: CPT

## 2023-05-08 PROCEDURE — 2709999900 IR US GUIDED PARACENTESIS

## 2023-05-08 PROCEDURE — 6360000002 HC RX W HCPCS: Performed by: SPECIALIST

## 2023-05-08 PROCEDURE — 87205 SMEAR GRAM STAIN: CPT

## 2023-05-08 RX ORDER — ALBUMIN (HUMAN) 12.5 G/50ML
SOLUTION INTRAVENOUS CONTINUOUS PRN
Status: COMPLETED | OUTPATIENT
Start: 2023-05-08 | End: 2023-05-08

## 2023-05-08 RX ADMIN — ALBUMIN (HUMAN) 62.5 G: 0.25 INJECTION, SOLUTION INTRAVENOUS at 10:14

## 2023-05-08 ASSESSMENT — PAIN - FUNCTIONAL ASSESSMENT: PAIN_FUNCTIONAL_ASSESSMENT: 0-10

## 2023-05-08 ASSESSMENT — PAIN SCALES - GENERAL: PAINLEVEL_OUTOF10: 0

## 2023-05-08 NOTE — OR NURSING
PROCEDURE PERFORMED: paracentesis     PRIMARY INDICATION FOR PROCEDURE: ascites    INFORMED CONSENT:  Obtained prior to procedure. Consent placed in chart. PT TRANSPORTED FROM:    [facility]                               TO THE IR ROOM:    small                     ARRIVED TO ROOM: 7933    ASSESSMENT:pt is alert and oriented x4 with VSS on r/a     STAFF PRESENT: Roya Westbrook RN, Gregoria RN, Alisha Thakur RN     BARRIER PRECAUTIONS & STERILE TECHNIQUE:               Pt transferred to the table and positioned for comfort. Warm blankets given. Pt placed on Cardiac Monitor. Pt and draped in a sterile fashion with chlorhexadine. PAIN/LOCAL ANESTHESIA/SEDATION MANAGEMENT:           Local: Lidocaine 1% given by  @3018       INTRAOPERATIVE:           ACCESS TIME: 0932 with Safe T centesis           US/FLUORO: US images x3  Access removed @1011            STERILE DRESSINGS: gauze and tegaderm     SPECIMENS: 11,870 CC of chyle fluid . 56 CC sent to lab   [place]  EBL:      >3cc    FOLLOW- UP X-RAY: none         COMPLICATIONS/ OUTCOME: pt tolerated well.   X62.5g of IV albumin given per order            LEFT THE ROOM: 1013    REPORT CALLED TO: bedside report given by Jim Lemus to Sterling Maravilla RN

## 2023-05-08 NOTE — PROGRESS NOTES
1023  Pt back to [facility] from IR per cart. Pt up right away to get dressed. Report from [facility]. VS rechecked and stable. Skin W&D. Gauze and tegaderm drsg intact to Rt abd wall. Pt states feels a lot better now. 1034 IV dc'd. Pt and wife instructed for discharge care and follow-up with understanding voiced. 1040 Pt to car at exit per wheelchair. Alert and oriented to person, place and time

## 2023-05-08 NOTE — DISCHARGE INSTRUCTIONS
[facility]    Patient Discharge Instructions Abdominal Fluid Drainage      You have had had an Abdominal Fluid Drainage in the Radiology Department. Below are guidelines for you to follow after your test is completed:    Go home and rest quietly for the remainder of the day. DO NOT drive, operate appliances, or make any legal decisions today. You may resume normal activities tomorrow. Have a responsible adult drive you home and remain with you for the remainder of the day. You may resume your normal diet after the procedure. Avoid alcoholic beverages and depressant drugs for 24 hours. You may resume your previous medications unless directed not to by your physician or the Radiologist. Gelene Door may use Tylenol (one or two tablets every four to six hours) for discomfort at the puncture site. If you develop severe pain, swelling, or redness at the site, call the Radiology Department. Call your physician immediately if you develop a rapid pulse (greater than 100 beats per minute), feel faint, sweat profusely, experience a sudden onset of weakness, or experience increased pain or swelling or saturation of dressing at the puncture site. Call your physician immediately if you develop a sudden onset of rapid breathing (greater than 20 breaths per minute), upper back or chest pain, shortness of breath, sweating, skin color change, or a sudden onset of anxiety. Report a temperature greater than 101 degrees Fahrenheit (38.8 degrees Celsius) to us or to your physician. Check the dressing throughout the day for an increase in drainage. Keep the dressing dry for 24 hours. Replace with a Band-Aid if necessary. You may shower 24 hours after the procedure. Do not smoke for 24 hours after the procedure. Call your physician for a follow-up appointment.   If any questions or complications develop after you go home, please call the Radiology Department at [phone]

## 2023-05-09 LAB
EST. AVERAGE GLUCOSE BLD GHB EST-MCNC: 111.2 MG/DL
HBA1C MFR BLD: 5.5 %

## 2023-05-09 ASSESSMENT — ENCOUNTER SYMPTOMS
WHEEZING: 0
TROUBLE SWALLOWING: 0
CHEST TIGHTNESS: 0
ABDOMINAL DISTENTION: 1
SHORTNESS OF BREATH: 0
VOMITING: 0
BLOOD IN STOOL: 0
DIARRHEA: 0
EYE PAIN: 0
ABDOMINAL PAIN: 0
NAUSEA: 0

## 2023-05-09 NOTE — PROGRESS NOTES
5/9/2023    [facility]    Chief Complaint   Patient presents with    3 Month Follow-Up     Going weekly for paracentesis. Ear Fullness     Ringing, makes it hard to hear. HPI  History was obtained from the patient and his wife Kana Antonio. Vandana Salmon is a 78 y.o. male who presents today with complaints of decreased hearing tinnitus and dizziness. Patient has history of alcoholic cirrhosis with secondary portal hypertension and recurrent massive ascites. Had recent paracentesis with removal of 11,800 cc. He is having this done weekly. Currently no shortness of breath noted. Profound decreased hearing noted. This has been progressive over a long period of time. REVIEW OF SYMPTOMS    Review of Systems   Constitutional:  Negative for activity change and fatigue. HENT:  Positive for hearing loss and tinnitus. Negative for congestion, mouth sores and trouble swallowing. Eyes:  Negative for pain and visual disturbance. Respiratory:  Negative for chest tightness, shortness of breath and wheezing. Cardiovascular:  Negative for chest pain and palpitations. Gastrointestinal:  Positive for abdominal distention. Negative for abdominal pain, blood in stool, diarrhea, nausea and vomiting. Patient with alcoholic cirrhosis and secondary portal hypertension with massive ascites requiring weekly paracenteses. Patient does follow regularly with GI medicine. Genitourinary:  Negative for dysuria, frequency and urgency. Musculoskeletal:  Negative for arthralgias, gait problem and neck stiffness. Skin:  Negative for rash. Allergic/Immunologic: Negative for environmental allergies. Neurological:  Positive for dizziness. Negative for seizures, speech difficulty and weakness. Hematological:  Does not bruise/bleed easily. Psychiatric/Behavioral:  Negative for agitation, confusion and hallucinations.       PAST MEDICAL HISTORY  Past Medical History:   Diagnosis Date    Acute bacterial

## 2023-05-11 LAB
CULTURE: ABNORMAL
GRAM SMEAR: ABNORMAL
Lab: ABNORMAL
SPECIMEN: ABNORMAL

## 2023-05-12 RX ORDER — SPIRONOLACTONE 100 MG/1
100 TABLET, FILM COATED ORAL 2 TIMES DAILY
Qty: 30 TABLET | Refills: 2 | Status: SHIPPED | OUTPATIENT
Start: 2023-05-12

## 2023-05-15 ENCOUNTER — HOSPITAL ENCOUNTER (OUTPATIENT)
Dept: INTERVENTIONAL RADIOLOGY/VASCULAR | Age: 78
Discharge: HOME | End: 2023-05-15
Payer: COMMERCIAL

## 2023-05-15 VITALS
RESPIRATION RATE: 20 BRPM | HEART RATE: 72 BPM | SYSTOLIC BLOOD PRESSURE: 121 MMHG | DIASTOLIC BLOOD PRESSURE: 73 MMHG | TEMPERATURE: 97.5 F | OXYGEN SATURATION: 100 %

## 2023-05-15 DIAGNOSIS — R18.8 OTHER ASCITES: ICD-10-CM

## 2023-05-15 LAB
APTT: 32.6 SECONDS (ref 25.1–37.1)
FLUID TYPE: NORMAL INDEX
HCT VFR BLD CALC: 33.5 % (ref 42–52)
HEMOGLOBIN: 10.7 GM/DL (ref 13.5–18)
INR BLD: 0.98 INDEX
LYMPHOCYTES, BODY FLUID: 39 %
MCH RBC QN AUTO: 30.7 PG (ref 27–31)
MCHC RBC AUTO-ENTMCNC: 31.9 % (ref 32–36)
MCV RBC AUTO: 96 FL (ref 78–100)
MESOTHELIAL FLUID: 3 /100 WBC
MONOCYTE, FLUID: 48 %
NEUTROPHIL, FLUID: 12 %
OTHER CELLS FLUID: NORMAL
PDW BLD-RTO: 16.1 % (ref 11.7–14.9)
PLATELET # BLD: 278 K/CU MM (ref 140–440)
PMV BLD AUTO: 10 FL (ref 7.5–11.1)
PROTHROMBIN TIME: 12.5 SECONDS (ref 11.7–14.5)
RBC # BLD: 3.49 M/CU MM (ref 4.6–6.2)
RBC FLUID: 150 /CU MM
WBC # BLD: 7.5 K/CU MM (ref 4–10.5)
WBC FLUID: 126 /CU MM

## 2023-05-15 PROCEDURE — 85027 COMPLETE CBC AUTOMATED: CPT

## 2023-05-15 PROCEDURE — 87070 CULTURE OTHR SPECIMN AEROBIC: CPT

## 2023-05-15 PROCEDURE — 85610 PROTHROMBIN TIME: CPT

## 2023-05-15 PROCEDURE — 85730 THROMBOPLASTIN TIME PARTIAL: CPT

## 2023-05-15 PROCEDURE — 87205 SMEAR GRAM STAIN: CPT

## 2023-05-15 PROCEDURE — P9047 ALBUMIN (HUMAN), 25%, 50ML: HCPCS

## 2023-05-15 PROCEDURE — 89051 BODY FLUID CELL COUNT: CPT

## 2023-05-15 PROCEDURE — 2709999900 IR US GUIDED PARACENTESIS

## 2023-05-15 PROCEDURE — 49083 ABD PARACENTESIS W/IMAGING: CPT

## 2023-05-15 PROCEDURE — 6360000002 HC RX W HCPCS: Performed by: SPECIALIST

## 2023-05-15 PROCEDURE — 6360000002 HC RX W HCPCS

## 2023-05-15 PROCEDURE — P9047 ALBUMIN (HUMAN), 25%, 50ML: HCPCS | Performed by: SPECIALIST

## 2023-05-15 RX ORDER — ALBUMIN (HUMAN) 12.5 G/50ML
SOLUTION INTRAVENOUS CONTINUOUS PRN
Status: DISCONTINUED | OUTPATIENT
Start: 2023-05-15 | End: 2023-05-16 | Stop reason: HOSPADM

## 2023-05-15 RX ORDER — SODIUM CHLORIDE 0.9 % (FLUSH) 0.9 %
10 SYRINGE (ML) INJECTION PRN
Status: DISCONTINUED | OUTPATIENT
Start: 2023-05-15 | End: 2023-05-16 | Stop reason: HOSPADM

## 2023-05-15 RX ADMIN — ALBUMIN (HUMAN) 37.5 G: 0.25 INJECTION, SOLUTION INTRAVENOUS at 10:35

## 2023-05-15 ASSESSMENT — PAIN - FUNCTIONAL ASSESSMENT: PAIN_FUNCTIONAL_ASSESSMENT: 0-10

## 2023-05-15 ASSESSMENT — PAIN SCALES - GENERAL: PAINLEVEL_OUTOF10: 0

## 2023-05-15 NOTE — PROGRESS NOTES
1038  Pt back to [facility] Day from IR per cart. Pt is awake and alert--skin W&D. Pt voices no c/o pain. Report received from [facility]. Pt has dry gauze and tegaderm drsg intact to Rt abdomen. VS rechecked and stable. 1050 Pt instructed for discharge care and follow-up with understanding voiced. IV dc'd for pt to get dressed. 1055 Pt ambulatory to car at exit with nurse escort.

## 2023-05-15 NOTE — DISCHARGE INSTRUCTIONS
[facility]    Patient Discharge Instructions Abdominal Fluid Drainage      You have had had an Abdominal Fluid Drainage in the Radiology Department. Below are guidelines for you to follow after your test is completed:    Go home and rest quietly for the remainder of the day. DO NOT drive, operate appliances, or make any legal decisions today. You may resume normal activities tomorrow. Have a responsible adult drive you home and remain with you for the remainder of the day. You may resume your normal diet after the procedure. Avoid alcoholic beverages and depressant drugs for 24 hours. You may resume your previous medications unless directed not to by your physician or the Radiologist. Deanntrevor Foys may use Tylenol (one or two tablets every four to six hours) for discomfort at the puncture site. If you develop severe pain, swelling, or redness at the site, call the Radiology Department. Call your physician immediately if you develop a rapid pulse (greater than 100 beats per minute), feel faint, sweat profusely, experience a sudden onset of weakness, or experience increased pain or swelling or saturation of dressing at the puncture site. Call your physician immediately if you develop a sudden onset of rapid breathing (greater than 20 breaths per minute), upper back or chest pain, shortness of breath, sweating, skin color change, or a sudden onset of anxiety. Report a temperature greater than 101 degrees Fahrenheit (38.8 degrees Celsius) to us or to your physician. Check the dressing throughout the day for an increase in drainage. Keep the dressing dry for 24 hours. Replace with a Band-Aid if necessary. You may shower 24 hours after the procedure. Do not smoke for 24 hours after the procedure. Call your physician for a follow-up appointment.   If any questions or complications develop after you go home, please call the Radiology Department at [phone]

## 2023-05-15 NOTE — OR NURSING
PROCEDURE PERFORMED: Paracentesis    PRIMARY INDICATION FOR PROCEDURE: Ascites    INFORMED CONSENT:  Obtained prior to procedure. Consent placed in chart. ASSESSMENT:  Pt is alert and oriented x4. Pt verbalizes understanding of procedure. BARRIER PRECAUTIONS & STERILE TECHNIQUE:               Pt remains in bed and positioned semi-griffiths position for procedure. Warm blankets given. Pt placed on Vitals Signs Monitor. Pt prepped and draped in a sterile fashion with chlorhexadine. TIME OUT AT:  4830       Name, , allergies, labs, code status and procedure reviewed during time out. PAIN/LOCAL ANESTHESIA/SEDATION MANAGEMENT:           Local: Lidocaine 1% given by Dr Lex Mcleod at [address]: 1008          US/FLUORO: 1 US image          CATHETER USED: one step          FINAL IMAGE TAKEN TO CONFIRM PLACEMENT OF: catheter            1008 - white fluid returns  9596 - blood seen in cathter, access removed. 18 - Dr Lex Mcleod consulted and visualized abdomen under US. STERILE DRESSINGS: 4x4 gauze and tagaderm    SPECIMENS: A total of 5750 cc white fluid and 1020 cc sent to lab.     EBL:      <1BR    COMPLICATIONS:  none    STAFF IN ROOM: DR Erick Hudson RT, Yamile RT, Kristen Castle RN    REPORT CALLED TO: given BS to [facility] in [facility]

## 2023-05-16 NOTE — H&P
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 Initially treated with vancomycin but had a supratherapeutic level, hence, it was substituted by daptomycin. Cumulative 6-week course of therapy will be completed on 3/13/2023. Anxiety     Chronic heart failure, unspecified heart failure type ([facility] 2023    Cirrhosis, alcoholic (HCC)     Di[facility]     GERD (gastroesophageal reflux disease)     GERD (gastroesophageal reflux disease)     Hyperlipidemia     Hypertension     Portal hypertension (HCC)     Pulmonary nodules     Secondary esophageal varices without bleeding (HCC)     Sleep apnea     SOB (shortness of breath)     Thyroid disease        Past Surgical History:  Past Surgical History:   Procedure Laterality Date    APPENDECTOMY      CHOLECYSTECTOMY      COLONOSCOPY      ENDOSCOPY, COLON, DIAGNOSTIC      FOOT SURGERY      needle removed,not sure which foot, per wife.     TONSILLECTOMY      UPPER GASTROINTESTINAL ENDOSCOPY N/A 2022    EGD BIOPSY performed by Ayo Lester MD at [address] [place] [address] History:  Social History     Socioeconomic History    Marital status:      Spouse name: Not on file    Number of children: Not on file    Years of education: Not on file    Highest education level: Not on file   Occupational History     Comment: Retired    Tobacco Use    Smoking status: Former     Packs/day: 1.00     Years: 14.00     Pack years: 14.00     Types: Cigarettes     Quit date:      Years since quittin.4    Smokeless tobacco: Never   Vaping Use

## 2023-05-18 LAB
CULTURE: ABNORMAL
GRAM SMEAR: ABNORMAL
Lab: ABNORMAL
SPECIMEN: ABNORMAL

## 2023-05-22 ENCOUNTER — HOSPITAL ENCOUNTER (OUTPATIENT)
Dept: INTERVENTIONAL RADIOLOGY/VASCULAR | Age: 78
Discharge: HOME | End: 2023-05-22
Payer: COMMERCIAL

## 2023-05-22 VITALS
RESPIRATION RATE: 16 BRPM | HEART RATE: 70 BPM | TEMPERATURE: 98.5 F | WEIGHT: 230 LBS | HEIGHT: 68 IN | OXYGEN SATURATION: 98 % | SYSTOLIC BLOOD PRESSURE: 107 MMHG | BODY MASS INDEX: 34.86 KG/M2 | DIASTOLIC BLOOD PRESSURE: 66 MMHG

## 2023-05-22 DIAGNOSIS — R18.8 OTHER ASCITES: ICD-10-CM

## 2023-05-22 LAB
FLUID TYPE: NORMAL INDEX
LYMPHOCYTES, BODY FLUID: 41 %
MESOTHELIAL FLUID: 0 /100 WBC
MONOCYTE, FLUID: 46 %
NEUTROPHIL, FLUID: 13 %
RBC FLUID: 30 /CU MM
WBC FLUID: 109 /CU MM

## 2023-05-22 PROCEDURE — 89051 BODY FLUID CELL COUNT: CPT

## 2023-05-22 PROCEDURE — 87070 CULTURE OTHR SPECIMN AEROBIC: CPT

## 2023-05-22 PROCEDURE — 6360000002 HC RX W HCPCS

## 2023-05-22 PROCEDURE — 49083 ABD PARACENTESIS W/IMAGING: CPT

## 2023-05-22 PROCEDURE — P9047 ALBUMIN (HUMAN), 25%, 50ML: HCPCS | Performed by: SPECIALIST

## 2023-05-22 PROCEDURE — P9047 ALBUMIN (HUMAN), 25%, 50ML: HCPCS

## 2023-05-22 PROCEDURE — 2709999900 IR US GUIDED PARACENTESIS

## 2023-05-22 PROCEDURE — 6360000002 HC RX W HCPCS: Performed by: SPECIALIST

## 2023-05-22 PROCEDURE — 87205 SMEAR GRAM STAIN: CPT

## 2023-05-22 RX ORDER — ALBUMIN (HUMAN) 12.5 G/50ML
SOLUTION INTRAVENOUS CONTINUOUS PRN
Status: COMPLETED | OUTPATIENT
Start: 2023-05-22 | End: 2023-05-22

## 2023-05-22 RX ORDER — SODIUM CHLORIDE 0.9 % (FLUSH) 0.9 %
10 SYRINGE (ML) INJECTION PRN
Status: DISCONTINUED | OUTPATIENT
Start: 2023-05-22 | End: 2023-05-23 | Stop reason: HOSPADM

## 2023-05-22 RX ADMIN — ALBUMIN (HUMAN) 75 G: 0.25 INJECTION, SOLUTION INTRAVENOUS at 12:16

## 2023-05-22 RX ADMIN — Medication 10 ML: at 09:25

## 2023-05-22 ASSESSMENT — PAIN - FUNCTIONAL ASSESSMENT: PAIN_FUNCTIONAL_ASSESSMENT: 0-10

## 2023-05-22 ASSESSMENT — PAIN SCALES - GENERAL: PAINLEVEL_OUTOF10: 0

## 2023-05-22 NOTE — OR NURSING
PROCEDURE PERFORMED: Paracentesis    PRIMARY INDICATION FOR PROCEDURE: Ascites     INFORMED CONSENT:  Obtained prior to procedure. Consent placed in chart. PT TRANSPORTED FROM:  [facility]                             TO THE IR ROOM:  Small                    ARRIVED TO ROOM: 1106    ASSESSMENT: Patient arrived to room, alert and oriented X4, on room air, VSS. DR. Jamie Sánchez Arrived @ [address] TECHNIQUE:               Pt positioned for procedure. Warm blankets offered. Pt placed on VSS Monitor. Pt prepped and draped in a sterile fashion with chlorhexadine. PROCEDURE STARTED: 1115    PAIN/LOCAL ANESTHESIA/SEDATION MANAGEMENT:           Local: Lidocaine 1% given by   @6012           INTRAOPERATIVE:           ACCESS TIME: 1116 with One- Step          ACCESS [id number]:8561          US/FLUORO: 2          ALBUMIN: 75 grams given            STERILE DRESSINGS: Gauze and tega derm    SPECIMENS: Total 35798 cc, 1020 cc White Milky Ascitic Fluid sent to lab    EBL:  <1 cc      PROCEDURE ENDED: 8284    COMPLICATIONS/ OUTCOME:Patient tolerated procedure well, Alert and oriented X4, on room air.         LEFT THE ROOM: 1222    REPORT GIVEN TO: [facility] Nurse Maylin Holcomb at Bedside

## 2023-05-22 NOTE — PROGRESS NOTES
1225  Pt back to Same Day from IR per cart. Pt is awake and alert. Abdomen is greatly reduced in size from pre-procedure and is soft to touch. Pt states feels much better over all. Report received from [facility]. VS rechecked and stable. Has gauze and tegaderm drsg intact to Rt lower abdomen with no signs of drainage noted. 18 Pt and wife instructed for discharge care and follow-up with understanding voiced. IV lock dc'd for pt to get dressed. 1239 Pt to car at exit per wheelchair by volunteer.

## 2023-05-25 LAB
CULTURE: ABNORMAL
GRAM SMEAR: ABNORMAL
Lab: ABNORMAL
SPECIMEN: ABNORMAL

## 2023-05-30 ENCOUNTER — HOSPITAL ENCOUNTER (OUTPATIENT)
Dept: INTERVENTIONAL RADIOLOGY/VASCULAR | Age: 78
Discharge: HOME | End: 2023-05-30
Payer: COMMERCIAL

## 2023-05-30 VITALS
TEMPERATURE: 96.3 F | WEIGHT: 230 LBS | SYSTOLIC BLOOD PRESSURE: 112 MMHG | HEIGHT: 68 IN | DIASTOLIC BLOOD PRESSURE: 63 MMHG | BODY MASS INDEX: 34.86 KG/M2 | HEART RATE: 70 BPM | RESPIRATION RATE: 18 BRPM | OXYGEN SATURATION: 98 %

## 2023-05-30 DIAGNOSIS — R18.8 OTHER ASCITES: ICD-10-CM

## 2023-05-30 LAB
FLUID TYPE: NORMAL INDEX
LYMPHOCYTES, BODY FLUID: 42 %
MESOTHELIAL FLUID: 16 /100 WBC
MONOCYTE, FLUID: NORMAL %
NEUTROPHIL, FLUID: 14 %
OTHER CELLS FLUID: NORMAL
RBC FLUID: 196 /CU MM
WBC FLUID: 136 /CU MM

## 2023-05-30 PROCEDURE — P9047 ALBUMIN (HUMAN), 25%, 50ML: HCPCS | Performed by: SPECIALIST

## 2023-05-30 PROCEDURE — 87205 SMEAR GRAM STAIN: CPT

## 2023-05-30 PROCEDURE — 89051 BODY FLUID CELL COUNT: CPT

## 2023-05-30 PROCEDURE — 49083 ABD PARACENTESIS W/IMAGING: CPT

## 2023-05-30 PROCEDURE — 6360000002 HC RX W HCPCS

## 2023-05-30 PROCEDURE — 2709999900 IR US GUIDED PARACENTESIS

## 2023-05-30 PROCEDURE — P9047 ALBUMIN (HUMAN), 25%, 50ML: HCPCS

## 2023-05-30 PROCEDURE — 87070 CULTURE OTHR SPECIMN AEROBIC: CPT

## 2023-05-30 PROCEDURE — 6360000002 HC RX W HCPCS: Performed by: SPECIALIST

## 2023-05-30 RX ORDER — ALBUMIN (HUMAN) 12.5 G/50ML
SOLUTION INTRAVENOUS CONTINUOUS PRN
Status: COMPLETED | OUTPATIENT
Start: 2023-05-30 | End: 2023-05-30

## 2023-05-30 RX ADMIN — ALBUMIN (HUMAN) 50 G: 0.25 INJECTION, SOLUTION INTRAVENOUS at 10:48

## 2023-05-30 NOTE — FLOWSHEET NOTE
Discharge instructions reviewed with patient per FransiscaRN  Voices understanding. Ambulated without difficulty.

## 2023-06-02 LAB
CULTURE: NORMAL
Lab: NORMAL
SPECIMEN: NORMAL

## 2023-06-05 ENCOUNTER — HOSPITAL ENCOUNTER (OUTPATIENT)
Dept: INTERVENTIONAL RADIOLOGY/VASCULAR | Age: 78
Discharge: HOME | End: 2023-06-05
Payer: COMMERCIAL

## 2023-06-05 VITALS
SYSTOLIC BLOOD PRESSURE: 106 MMHG | HEIGHT: 68 IN | BODY MASS INDEX: 34.86 KG/M2 | DIASTOLIC BLOOD PRESSURE: 56 MMHG | RESPIRATION RATE: 16 BRPM | TEMPERATURE: 96.1 F | OXYGEN SATURATION: 98 % | WEIGHT: 230 LBS | HEART RATE: 77 BPM

## 2023-06-05 DIAGNOSIS — R18.8 OTHER ASCITES: ICD-10-CM

## 2023-06-05 LAB
FLUID TYPE: NORMAL INDEX
LYMPHOCYTES, BODY FLUID: 30 %
MESOTHELIAL FLUID: 10 /100 WBC
MONOCYTE, FLUID: 70 %
NEUTROPHIL, FLUID: 0 %
RBC FLUID: 76 /CU MM
WBC FLUID: 179 /CU MM

## 2023-06-05 PROCEDURE — 2709999900 IR US GUIDED PARACENTESIS

## 2023-06-05 PROCEDURE — P9047 ALBUMIN (HUMAN), 25%, 50ML: HCPCS

## 2023-06-05 PROCEDURE — 6360000002 HC RX W HCPCS: Performed by: SPECIALIST

## 2023-06-05 PROCEDURE — 87205 SMEAR GRAM STAIN: CPT

## 2023-06-05 PROCEDURE — 49083 ABD PARACENTESIS W/IMAGING: CPT

## 2023-06-05 PROCEDURE — 2500000003 HC RX 250 WO HCPCS: Performed by: RADIOLOGY

## 2023-06-05 PROCEDURE — 2580000003 HC RX 258

## 2023-06-05 PROCEDURE — 89051 BODY FLUID CELL COUNT: CPT

## 2023-06-05 PROCEDURE — 87070 CULTURE OTHR SPECIMN AEROBIC: CPT

## 2023-06-05 PROCEDURE — 6360000002 HC RX W HCPCS

## 2023-06-05 PROCEDURE — P9047 ALBUMIN (HUMAN), 25%, 50ML: HCPCS | Performed by: SPECIALIST

## 2023-06-05 RX ORDER — ALBUMIN (HUMAN) 12.5 G/50ML
SOLUTION INTRAVENOUS CONTINUOUS PRN
Status: COMPLETED | OUTPATIENT
Start: 2023-06-05 | End: 2023-06-05

## 2023-06-05 RX ORDER — LIDOCAINE HYDROCHLORIDE 10 MG/ML
INJECTION, SOLUTION EPIDURAL; INFILTRATION; INTRACAUDAL; PERINEURAL PRN
Status: COMPLETED | OUTPATIENT
Start: 2023-06-05 | End: 2023-06-05

## 2023-06-05 RX ADMIN — ALBUMIN (HUMAN) 25 G: 0.25 INJECTION, SOLUTION INTRAVENOUS at 10:40

## 2023-06-05 RX ADMIN — LIDOCAINE HYDROCHLORIDE 5 ML: 10 INJECTION, SOLUTION EPIDURAL; INFILTRATION; INTRACAUDAL; PERINEURAL at 10:31

## 2023-06-05 NOTE — OR NURSING
PROCEDURE PERFORMED: Paracentesis by      INFORMED CONSENT:  Obtained prior to procedure. Consent placed in chart. BARRIER PRECAUTIONS & STERILE TECHNIQUE:               Pt positioned for comfort. Warm blankets given. Pt placed on Vital Monitor. Pt prepped and draped in a sterile fashion with chlorhexadine.     PAIN/LOCAL ANESTHESIA/SEDATION MANAGEMENT:           Local: Lidocaine 1% given by             INTRAOPERATIVE:           ACCESS TIME: 1032          US/FLUORO: 3 US Images obtained           CATHETER USED: One step           STERILE DRESSINGS: Guaze and Tegaderm     SPECIMENS: 1020 cc milky white ascitic fluid sent tot lab, 4520 cc removed total    REPORT CALLED TO: JANNETH BEAVER

## 2023-06-05 NOTE — DISCHARGE INSTRUCTIONS
Monitor site for signs/symptoms of infection: fever, chills, redness at site, drainage, etc.  Keep dressing dry for 24 hours. Follow-up with PCP.

## 2023-06-08 LAB
CULTURE: ABNORMAL
GRAM SMEAR: ABNORMAL
Lab: ABNORMAL
SPECIMEN: ABNORMAL

## 2023-06-19 ENCOUNTER — HOSPITAL ENCOUNTER (OUTPATIENT)
Dept: INTERVENTIONAL RADIOLOGY/VASCULAR | Age: 78
Discharge: HOME | End: 2023-06-19
Payer: COMMERCIAL

## 2023-06-19 VITALS
HEIGHT: 68 IN | WEIGHT: 230 LBS | RESPIRATION RATE: 16 BRPM | OXYGEN SATURATION: 98 % | BODY MASS INDEX: 34.86 KG/M2 | HEART RATE: 70 BPM | SYSTOLIC BLOOD PRESSURE: 119 MMHG | DIASTOLIC BLOOD PRESSURE: 66 MMHG | TEMPERATURE: 96.7 F

## 2023-06-19 DIAGNOSIS — K70.31 ASCITES DUE TO ALCOHOLIC CIRRHOSIS (HCC): ICD-10-CM

## 2023-06-19 LAB
APTT: 33.4 SECONDS (ref 25.1–37.1)
HCT VFR BLD CALC: 34.6 % (ref 42–52)
HEMOGLOBIN: 10.8 GM/DL (ref 13.5–18)
INR BLD: 0.98 INDEX
MCH RBC QN AUTO: 29.8 PG (ref 27–31)
MCHC RBC AUTO-ENTMCNC: 31.2 % (ref 32–36)
MCV RBC AUTO: 95.3 FL (ref 78–100)
PDW BLD-RTO: 15 % (ref 11.7–14.9)
PLATELET # BLD: 237 K/CU MM (ref 140–440)
PMV BLD AUTO: 10 FL (ref 7.5–11.1)
PROTHROMBIN TIME: 12.5 SECONDS (ref 11.7–14.5)
RBC # BLD: 3.63 M/CU MM (ref 4.6–6.2)
WBC # BLD: 7.1 K/CU MM (ref 4–10.5)

## 2023-06-19 PROCEDURE — 49083 ABD PARACENTESIS W/IMAGING: CPT

## 2023-06-19 PROCEDURE — 85610 PROTHROMBIN TIME: CPT

## 2023-06-19 PROCEDURE — 85730 THROMBOPLASTIN TIME PARTIAL: CPT

## 2023-06-19 PROCEDURE — P9047 ALBUMIN (HUMAN), 25%, 50ML: HCPCS

## 2023-06-19 PROCEDURE — 2500000003 HC RX 250 WO HCPCS: Performed by: RADIOLOGY

## 2023-06-19 PROCEDURE — 87205 SMEAR GRAM STAIN: CPT

## 2023-06-19 PROCEDURE — 87070 CULTURE OTHR SPECIMN AEROBIC: CPT

## 2023-06-19 PROCEDURE — 85027 COMPLETE CBC AUTOMATED: CPT

## 2023-06-19 PROCEDURE — 87075 CULTR BACTERIA EXCEPT BLOOD: CPT

## 2023-06-19 PROCEDURE — 2709999900 IR US GUIDED PARACENTESIS

## 2023-06-19 PROCEDURE — 2580000003 HC RX 258

## 2023-06-19 PROCEDURE — P9047 ALBUMIN (HUMAN), 25%, 50ML: HCPCS | Performed by: NURSE PRACTITIONER

## 2023-06-19 PROCEDURE — 49082 ABD PARACENTESIS: CPT

## 2023-06-19 PROCEDURE — 6360000002 HC RX W HCPCS

## 2023-06-19 PROCEDURE — 6360000002 HC RX W HCPCS: Performed by: NURSE PRACTITIONER

## 2023-06-19 RX ORDER — SODIUM CHLORIDE 0.9 % (FLUSH) 0.9 %
10 SYRINGE (ML) INJECTION PRN
Status: DISCONTINUED | OUTPATIENT
Start: 2023-06-19 | End: 2023-06-20 | Stop reason: HOSPADM

## 2023-06-19 RX ORDER — LIDOCAINE HYDROCHLORIDE 10 MG/ML
INJECTION, SOLUTION EPIDURAL; INFILTRATION; INTRACAUDAL; PERINEURAL PRN
Status: COMPLETED | OUTPATIENT
Start: 2023-06-19 | End: 2023-06-19

## 2023-06-19 RX ORDER — ALBUMIN (HUMAN) 12.5 G/50ML
SOLUTION INTRAVENOUS CONTINUOUS PRN
Status: COMPLETED | OUTPATIENT
Start: 2023-06-19 | End: 2023-06-19

## 2023-06-19 RX ADMIN — ALBUMIN (HUMAN) 50 G: 0.25 INJECTION, SOLUTION INTRAVENOUS at 11:51

## 2023-06-19 RX ADMIN — LIDOCAINE HYDROCHLORIDE 10 ML: 10 INJECTION, SOLUTION EPIDURAL; INFILTRATION; INTRACAUDAL; PERINEURAL at 11:04

## 2023-06-19 NOTE — OR NURSING
PROCEDURE PERFORMED: paracentesis     PRIMARY INDICATION FOR PROCEDURE: ascites     INFORMED CONSENT:  Obtained prior to procedure. Consent placed in chart. PT TRANSPORTED FROM:  [place]                        TO THE IR ROOM:    small room                     ARRIVED TO ROOM: 1030    ASSESSMENT:patient is alert and oriented x4 with VSS on R/A    STAFF PRESENT: Neetu Sherman RN. Donta Madrigal Arrived @5    BARRIER PRECAUTIONS & STERILE TECHNIQUE:               Pt remained in bed and positioned for comfort. Warm blankets declined. Pt placed on vital sign Monitor. Pt's right lower quadrant  prepped and draped in a sterile fashion with chlorhexadine.     TIME OUT:  1103    PROCEDURE STARTED: 1103    PAIN/LOCAL ANESTHESIA/SEDATION MANAGEMENT:           Local: Lidocaine 1% given by Esther Patel  @1104     INTRAOPERATIVE:           ACCESS TIME: @1105 with a one step                     US/FLUORO: US images x2  ACCESS [id number]            STERILE DRESSINGS:   Gauze and tegaderm     SPECIMENS:   Total of 7420 CC of chyle fluid removed , 1020cc sent to lab    EBL:      >1cc    FOLLOW- UP X-RAY: none         COMPLICATIONS/ OUTCOME:  Patient tolerated procedure well - total of 50 g albumin 25% given IV per telephone order from Breezy Brochure NP            LEFT THE ROOM: 0384    REPORT GIVEN TO: Guillermo Irene

## 2023-06-19 NOTE — PROGRESS NOTES
Discharge instructions reviewed with patient per [facility] RN. Voices understanding. Ambulated without difficulty.

## 2023-06-22 LAB
CULTURE: ABNORMAL
GRAM SMEAR: ABNORMAL
Lab: ABNORMAL
SPECIMEN: ABNORMAL

## 2023-06-24 LAB
CULTURE: NORMAL
Lab: NORMAL
SPECIMEN: NORMAL

## 2023-06-26 ENCOUNTER — HOSPITAL ENCOUNTER (OUTPATIENT)
Dept: INTERVENTIONAL RADIOLOGY/VASCULAR | Age: 78
Discharge: HOME | End: 2023-06-26
Payer: COMMERCIAL

## 2023-06-26 VITALS
DIASTOLIC BLOOD PRESSURE: 70 MMHG | RESPIRATION RATE: 16 BRPM | TEMPERATURE: 97.9 F | HEART RATE: 72 BPM | OXYGEN SATURATION: 99 % | SYSTOLIC BLOOD PRESSURE: 117 MMHG

## 2023-06-26 DIAGNOSIS — R18.8 OTHER ASCITES: ICD-10-CM

## 2023-06-26 LAB
FLUID TYPE: NORMAL INDEX
LYMPHOCYTES, BODY FLUID: 53 %
MESOTHELIAL FLUID: 0 /100 WBC
MONOCYTE, FLUID: 11 %
NEUTROPHIL, FLUID: 36 %
OTHER CELLS FLUID: 0
RBC FLUID: 2000 /CU MM
WBC FLUID: 160 /CU MM

## 2023-06-26 PROCEDURE — 87205 SMEAR GRAM STAIN: CPT

## 2023-06-26 PROCEDURE — 6360000002 HC RX W HCPCS

## 2023-06-26 PROCEDURE — 89051 BODY FLUID CELL COUNT: CPT

## 2023-06-26 PROCEDURE — P9047 ALBUMIN (HUMAN), 25%, 50ML: HCPCS | Performed by: SPECIALIST

## 2023-06-26 PROCEDURE — 2709999900 IR US GUIDED PARACENTESIS

## 2023-06-26 PROCEDURE — P9047 ALBUMIN (HUMAN), 25%, 50ML: HCPCS

## 2023-06-26 PROCEDURE — 87070 CULTURE OTHR SPECIMN AEROBIC: CPT

## 2023-06-26 PROCEDURE — 49083 ABD PARACENTESIS W/IMAGING: CPT

## 2023-06-26 PROCEDURE — 6360000002 HC RX W HCPCS: Performed by: SPECIALIST

## 2023-06-26 RX ORDER — ALBUMIN (HUMAN) 12.5 G/50ML
SOLUTION INTRAVENOUS CONTINUOUS PRN
Status: COMPLETED | OUTPATIENT
Start: 2023-06-26 | End: 2023-06-26

## 2023-06-26 RX ADMIN — ALBUMIN (HUMAN) 43.5 G: 0.25 INJECTION, SOLUTION INTRAVENOUS at 11:12

## 2023-06-26 ASSESSMENT — PAIN - FUNCTIONAL ASSESSMENT: PAIN_FUNCTIONAL_ASSESSMENT: 0-10

## 2023-06-29 DIAGNOSIS — E03.9 ACQUIRED HYPOTHYROIDISM: ICD-10-CM

## 2023-06-29 LAB
CULTURE: NORMAL
Lab: NORMAL
SPECIMEN: NORMAL

## 2023-06-29 RX ORDER — LEVOTHYROXINE SODIUM 0.05 MG/1
TABLET ORAL
Qty: 90 TABLET | Refills: 1 | Status: SHIPPED | OUTPATIENT
Start: 2023-06-29

## 2023-07-05 ENCOUNTER — HOSPITAL ENCOUNTER (OUTPATIENT)
Dept: INTERVENTIONAL RADIOLOGY/VASCULAR | Age: 78
Discharge: HOME | End: 2023-07-05
Payer: COMMERCIAL

## 2023-07-05 VITALS
SYSTOLIC BLOOD PRESSURE: 105 MMHG | RESPIRATION RATE: 18 BRPM | DIASTOLIC BLOOD PRESSURE: 66 MMHG | HEART RATE: 67 BPM | TEMPERATURE: 97.6 F | OXYGEN SATURATION: 95 %

## 2023-07-05 DIAGNOSIS — R18.8 OTHER ASCITES: ICD-10-CM

## 2023-07-05 LAB
FLUID TYPE: NORMAL INDEX
LYMPHOCYTES, BODY FLUID: 16 %
MESOTHELIAL FLUID: 10 /100 WBC
MONOCYTE, FLUID: NORMAL %
NEUTROPHIL, FLUID: 8 %
OTHER CELLS FLUID: NORMAL
RBC FLUID: 61 /CU MM
WBC FLUID: 110 /CU MM

## 2023-07-05 PROCEDURE — P9047 ALBUMIN (HUMAN), 25%, 50ML: HCPCS | Performed by: SPECIALIST

## 2023-07-05 PROCEDURE — 6360000002 HC RX W HCPCS: Performed by: SPECIALIST

## 2023-07-05 PROCEDURE — 87070 CULTURE OTHR SPECIMN AEROBIC: CPT

## 2023-07-05 PROCEDURE — 6360000002 HC RX W HCPCS

## 2023-07-05 PROCEDURE — 87205 SMEAR GRAM STAIN: CPT

## 2023-07-05 PROCEDURE — 2709999900 IR US GUIDED PARACENTESIS

## 2023-07-05 PROCEDURE — 49083 ABD PARACENTESIS W/IMAGING: CPT

## 2023-07-05 PROCEDURE — 89051 BODY FLUID CELL COUNT: CPT

## 2023-07-05 PROCEDURE — P9047 ALBUMIN (HUMAN), 25%, 50ML: HCPCS

## 2023-07-05 RX ORDER — SODIUM CHLORIDE 0.9 % (FLUSH) 0.9 %
10 SYRINGE (ML) INJECTION PRN
OUTPATIENT
Start: 2023-07-05

## 2023-07-05 RX ORDER — ALBUMIN (HUMAN) 12.5 G/50ML
SOLUTION INTRAVENOUS CONTINUOUS PRN
Status: COMPLETED | OUTPATIENT
Start: 2023-07-05 | End: 2023-07-05

## 2023-07-05 RX ADMIN — ALBUMIN (HUMAN) 50 G: 0.25 INJECTION, SOLUTION INTRAVENOUS at 12:15

## 2023-07-05 ASSESSMENT — PAIN SCALES - GENERAL: PAINLEVEL_OUTOF10: 0

## 2023-07-05 NOTE — OR NURSING
PROCEDURE PERFORMED: paracentesis    PRIMARY INDICATION FOR PROCEDURE: ascites    INFORMED CONSENT:  Obtained prior to procedure. Consent placed in chart. PT TRANSPORTED FROM:   [facility]                                 TO THE IR ROOM:  Small                      ASSESSMENT: Pt alert & oriented on RA. He is baseline [place]    BARRIER PRECAUTIONS & STERILE TECHNIQUE:               Pt positioned supine for comfort. Warm blankets given. Pt placed on Cardiac Monitor. Pt prepped and draped in a sterile fashion with chlorhexadine. PAIN/LOCAL ANESTHESIA/SEDATION MANAGEMENT:           Local: Lidocaine 1% given by Dr. Hollins [place]:           ACCESS TIME: 9846          US/FLUORO: US 2 images          CATHETER USED: OneStep    STERILE DRESSINGS: guaze & tegaderm    SPECIMENS: 7120ml creamy pale yellow ascitic fluid removed; 1020ml sent to lab    EBL:  <1ml        FOLLOW- UP X-RAY: None    COMPLICATIONS/ OUTCOME: 44G of 25% albumin administered IV.  VSS, pt tolerated well    REPORT CALLED TO: [facility] bedside

## 2023-07-05 NOTE — PROGRESS NOTES
1250:  Discharge instructions discussed with patient, verbalized an understanding. [phone]:  Pt discharged to  home via wheelchair alert and oriented with no c/o discomfort. RLQ puncture site covered with bandage, no bleeding or drainage noted. VSS.

## 2023-07-08 LAB
CULTURE: ABNORMAL
GRAM SMEAR: ABNORMAL
Lab: ABNORMAL
SPECIMEN: ABNORMAL

## 2023-07-12 ENCOUNTER — HOSPITAL ENCOUNTER (OUTPATIENT)
Dept: INTERVENTIONAL RADIOLOGY/VASCULAR | Age: 78
Discharge: HOME | End: 2023-07-12
Payer: COMMERCIAL

## 2023-07-12 VITALS
DIASTOLIC BLOOD PRESSURE: 59 MMHG | TEMPERATURE: 96.8 F | RESPIRATION RATE: 18 BRPM | WEIGHT: 225 LBS | OXYGEN SATURATION: 99 % | HEART RATE: 77 BPM | HEIGHT: 68 IN | SYSTOLIC BLOOD PRESSURE: 108 MMHG | BODY MASS INDEX: 34.1 KG/M2

## 2023-07-12 DIAGNOSIS — R18.8 OTHER ASCITES: ICD-10-CM

## 2023-07-12 LAB
FLUID TYPE: NORMAL INDEX
LYMPHOCYTES, BODY FLUID: 37 %
MESOTHELIAL FLUID: 2 /100 WBC
MONOCYTE, FLUID: 49 %
NEUTROPHIL, FLUID: 14 %
RBC FLUID: 415 /CU MM
WBC FLUID: 95 /CU MM

## 2023-07-12 PROCEDURE — 2709999900 IR US GUIDED PARACENTESIS

## 2023-07-12 PROCEDURE — 87205 SMEAR GRAM STAIN: CPT

## 2023-07-12 PROCEDURE — 6360000002 HC RX W HCPCS: Performed by: SPECIALIST

## 2023-07-12 PROCEDURE — 2580000003 HC RX 258

## 2023-07-12 PROCEDURE — 89051 BODY FLUID CELL COUNT: CPT

## 2023-07-12 PROCEDURE — P9047 ALBUMIN (HUMAN), 25%, 50ML: HCPCS | Performed by: SPECIALIST

## 2023-07-12 PROCEDURE — P9047 ALBUMIN (HUMAN), 25%, 50ML: HCPCS

## 2023-07-12 PROCEDURE — 6360000002 HC RX W HCPCS

## 2023-07-12 PROCEDURE — 49083 ABD PARACENTESIS W/IMAGING: CPT

## 2023-07-12 PROCEDURE — 87070 CULTURE OTHR SPECIMN AEROBIC: CPT

## 2023-07-12 RX ORDER — SODIUM CHLORIDE 0.9 % (FLUSH) 0.9 %
10 SYRINGE (ML) INJECTION PRN
Status: DISCONTINUED | OUTPATIENT
Start: 2023-07-12 | End: 2023-07-13 | Stop reason: HOSPADM

## 2023-07-12 RX ORDER — ALBUMIN (HUMAN) 12.5 G/50ML
SOLUTION INTRAVENOUS CONTINUOUS PRN
Status: COMPLETED | OUTPATIENT
Start: 2023-07-12 | End: 2023-07-12

## 2023-07-12 RX ADMIN — Medication 10 ML: at 09:05

## 2023-07-12 RX ADMIN — ALBUMIN (HUMAN) 62.5 G: 0.25 INJECTION, SOLUTION INTRAVENOUS at 11:10

## 2023-07-12 ASSESSMENT — PAIN SCALES - GENERAL: PAINLEVEL_OUTOF10: 0

## 2023-07-12 ASSESSMENT — PAIN - FUNCTIONAL ASSESSMENT: PAIN_FUNCTIONAL_ASSESSMENT: 0-10

## 2023-07-12 NOTE — PROGRESS NOTES
1119- Pt returned to SDS rm 14, respirations even and unlabored, VSS, pt alert and oriented. Report at bedside w/ Marie Jacinto RN.  Dressing clean, dry and intact   1121- Beverage provided to pt, tolerating well   1135- Pt discharged

## 2023-07-12 NOTE — OR NURSING
PROCEDURE PERFORMED: Paracentesis     PRIMARY INDICATION FOR PROCEDURE: ascites    INFORMED CONSENT:  Obtained prior to procedure. Consent placed in chart. PT TRANSPORTED FROM:   [facility]                                 TO THE IR ROOM:    large room                     ARRIVED TO ROOM: 1020    ASSESSMENT:Pt is A&O x4 with VSS on R/A. No signs or symptoms of distress. STAFF PRESENT: [employer] [facility] medical student,Nanda DIGGS,Elizabeth RN    BARRIER PRECAUTIONS & STERILE TECHNIQUE:               Pt remained in bed and positioned for procedure. Warm blankets given. Pt placed on vital sign Monitor. Pt's RLQ prepped and draped in a sterile fashion with chlorhexadine. PAIN/LOCAL ANESTHESIA/SEDATION MANAGEMENT:           Local: Lidocaine 1% given by   @3654            INTRAOPERATIVE:           ACCESS:one step          ACCESS TIME: 1038          US/FLUORO: US image x1          ACCESS REMOVED:1111          STERILE DRESSINGS: gauze and tegaderm     SPECIMENS: total of  9770 CC of chyle fluid -1020 cc sent to the lab     EBL:     < 1cc    FOLLOW- UP X-RAY: none         COMPLICATIONS/ OUTCOME:Patient tolerated procedure well .  Total of 62.5G of 25% albumin given            LEFT THE ROOM: 1113    REPORT GIVEN/CALLED TO: given to SARANYA RN

## 2023-07-13 ENCOUNTER — OFFICE VISIT (OUTPATIENT)
Dept: GASTROENTEROLOGY | Age: 78
End: 2023-07-13
Payer: COMMERCIAL

## 2023-07-13 VITALS
HEIGHT: 68 IN | HEART RATE: 64 BPM | WEIGHT: 216.4 LBS | OXYGEN SATURATION: 97 % | SYSTOLIC BLOOD PRESSURE: 116 MMHG | TEMPERATURE: 97.5 F | BODY MASS INDEX: 32.8 KG/M2 | DIASTOLIC BLOOD PRESSURE: 74 MMHG

## 2023-07-13 DIAGNOSIS — R18.8 CIRRHOSIS OF LIVER WITH ASCITES, UNSPECIFIED HEPATIC CIRRHOSIS TYPE (HCC): Primary | ICD-10-CM

## 2023-07-13 DIAGNOSIS — K74.60 CIRRHOSIS OF LIVER WITH ASCITES, UNSPECIFIED HEPATIC CIRRHOSIS TYPE (HCC): Primary | ICD-10-CM

## 2023-07-13 PROCEDURE — 99214 OFFICE O/P EST MOD 30 MIN: CPT | Performed by: SPECIALIST

## 2023-07-13 PROCEDURE — 1123F ACP DISCUSS/DSCN MKR DOCD: CPT | Performed by: SPECIALIST

## 2023-07-13 PROCEDURE — 3078F DIAST BP <80 MM HG: CPT | Performed by: SPECIALIST

## 2023-07-13 PROCEDURE — 3074F SYST BP LT 130 MM HG: CPT | Performed by: SPECIALIST

## 2023-07-13 RX ORDER — SPIRONOLACTONE 100 MG/1
TABLET, FILM COATED ORAL
Qty: 30 TABLET | Refills: 0 | Status: SHIPPED | OUTPATIENT
Start: 2023-07-13

## 2023-07-13 RX ORDER — DULOXETIN HYDROCHLORIDE 60 MG/1
CAPSULE, DELAYED RELEASE ORAL
Qty: 90 CAPSULE | Refills: 0 | Status: SHIPPED | OUTPATIENT
Start: 2023-07-13

## 2023-07-15 LAB
CULTURE: ABNORMAL
GRAM SMEAR: ABNORMAL
Lab: ABNORMAL
SPECIMEN: ABNORMAL

## 2023-07-19 NOTE — PROGRESS NOTES
IR Procedure at [facility]:  Left message for patient to arrive at 1300 at [facility] on 7/21/2023 for his procedure at 1430. Patient has been given below instructions. ADDENDUM: left message to let patient know his procedure is now at 1030 and he needs to arrive at 0900. (Paracentesis, Thoracentesis, Thyroid Bx and Injections may have a light breakfast)  2. Follow your directions as prescribed by the doctor for your procedure and medications. 3.   Consult your provider as to when to stop blood thinner  4. Do not take any pain medication within 6 hours of your procedure  5. Do not drink any alcoholic beverages or use any street drugs 24 hours before procedure. 6.   Please wear simple, loose fitting clothing to the hospital.  Do not bring valuables (money,             credit cards, checkbooks, etc.)     7. If you  have a Living Will and Durable Power of  for Healthcare, please bring in a copy. 8.   Please bring picture ID,  insurance card, paperwork from the doctors office            (H & P, Consent,  & card for implantable devices). 9.   Report to the information desk on the ground floor. 10. Take a shower the night before or morning of your procedure, do not apply any lotion, oil or powder. 11. If you are going to be sedated for the procedure, you will need a responsible adult to drive you home.

## 2023-07-21 ENCOUNTER — HOSPITAL ENCOUNTER (OUTPATIENT)
Dept: INTERVENTIONAL RADIOLOGY/VASCULAR | Age: 78
Discharge: HOME | End: 2023-07-21
Payer: COMMERCIAL

## 2023-07-21 VITALS
HEART RATE: 66 BPM | RESPIRATION RATE: 16 BRPM | SYSTOLIC BLOOD PRESSURE: 134 MMHG | TEMPERATURE: 96.5 F | DIASTOLIC BLOOD PRESSURE: 64 MMHG | BODY MASS INDEX: 34.1 KG/M2 | OXYGEN SATURATION: 100 % | WEIGHT: 225 LBS | HEIGHT: 68 IN

## 2023-07-21 LAB
APTT: 33.3 SECONDS (ref 25.1–37.1)
FLUID TYPE: NORMAL INDEX
GLUCOSE BLD-MCNC: 98 MG/DL (ref 70–99)
GLUCOSE, FLUID: 108 MG/DL
HCT VFR BLD CALC: 35 % (ref 42–52)
HEMOGLOBIN: 10.9 GM/DL (ref 13.5–18)
INR BLD: 1.1 INDEX
LACTATE DEHYDROGENASE, FLUID: 43 IU/L
LYMPHOCYTES, BODY FLUID: 19 %
MCH RBC QN AUTO: 29.2 PG (ref 27–31)
MCHC RBC AUTO-ENTMCNC: 31.1 % (ref 32–36)
MCV RBC AUTO: 93.8 FL (ref 78–100)
MONOCYTE, FLUID: 75 %
NEUTROPHIL, FLUID: 6 %
PDW BLD-RTO: 15.6 % (ref 11.7–14.9)
PLATELET # BLD: 280 K/CU MM (ref 140–440)
PMV BLD AUTO: 9.7 FL (ref 7.5–11.1)
PROTEIN FLUID: 1.5 G/DL
PROTHROMBIN TIME: 14.4 SECONDS (ref 11.7–14.5)
RBC # BLD: 3.73 M/CU MM (ref 4.6–6.2)
RBC FLUID: 114 /CU MM
WBC # BLD: 7.8 K/CU MM (ref 4–10.5)
WBC FLUID: 149 /CU MM

## 2023-07-21 PROCEDURE — 82945 GLUCOSE OTHER FLUID: CPT

## 2023-07-21 PROCEDURE — 49083 ABD PARACENTESIS W/IMAGING: CPT

## 2023-07-21 PROCEDURE — 82962 GLUCOSE BLOOD TEST: CPT

## 2023-07-21 PROCEDURE — 87205 SMEAR GRAM STAIN: CPT

## 2023-07-21 PROCEDURE — P9047 ALBUMIN (HUMAN), 25%, 50ML: HCPCS | Performed by: SPECIALIST

## 2023-07-21 PROCEDURE — 84157 ASSAY OF PROTEIN OTHER: CPT

## 2023-07-21 PROCEDURE — 85027 COMPLETE CBC AUTOMATED: CPT

## 2023-07-21 PROCEDURE — 6360000002 HC RX W HCPCS: Performed by: SPECIALIST

## 2023-07-21 PROCEDURE — 85730 THROMBOPLASTIN TIME PARTIAL: CPT

## 2023-07-21 PROCEDURE — 83615 LACTATE (LD) (LDH) ENZYME: CPT

## 2023-07-21 PROCEDURE — 87070 CULTURE OTHR SPECIMN AEROBIC: CPT

## 2023-07-21 PROCEDURE — 6360000002 HC RX W HCPCS

## 2023-07-21 PROCEDURE — P9047 ALBUMIN (HUMAN), 25%, 50ML: HCPCS

## 2023-07-21 PROCEDURE — 2709999900 IR US GUIDED PARACENTESIS

## 2023-07-21 PROCEDURE — 89051 BODY FLUID CELL COUNT: CPT

## 2023-07-21 PROCEDURE — 85610 PROTHROMBIN TIME: CPT

## 2023-07-21 RX ORDER — ALBUMIN (HUMAN) 12.5 G/50ML
SOLUTION INTRAVENOUS CONTINUOUS PRN
Status: COMPLETED | OUTPATIENT
Start: 2023-07-21 | End: 2023-07-21

## 2023-07-21 RX ADMIN — ALBUMIN (HUMAN) 37.5 G: 0.25 INJECTION, SOLUTION INTRAVENOUS at 13:06

## 2023-07-21 NOTE — PROCEDURES
Department of Interventional Radiology Procedure Note  Weekday [phone] [id number] I Night/Weekend [phone]        Interventional Radiology Attending:  Daniel Rashid MD    DATE: 7/21/2023    PROCEDURE PERFORMED:  Ultrasound Guided Paracentesis    PRE-PROCEDURE DIAGNOSIS:  Ascites    H&P STATUS: H&P was reviewed, the patient was examined and no change has occurred in patient's condition since H&P was completed. POST-PROCEDURE DIAGNOSIS:  Same    PRELIMINARY FINDINGS:  5.77 Liters of fluid aspirated. ESTIMATED BLOOD LOSS:  None    COMPLICATIONS:  none    Full Report to Follow.     Daniel Rashid MD

## 2023-07-21 NOTE — DISCHARGE INSTRUCTIONS
Call your doctor if any signs of infection occur (fever, drainage, or redness)  Remove dressing tomorrowl

## 2023-07-21 NOTE — PROGRESS NOTES
Received from IR lab. Monitor and alarms on. Call light within reach. VS obtained and stable. Right abd dressing dry and intact.

## 2023-07-23 LAB
CULTURE: NORMAL
Lab: NORMAL
SPECIMEN: NORMAL

## 2023-07-25 RX ORDER — SPIRONOLACTONE 100 MG/1
TABLET, FILM COATED ORAL
Qty: 60 TABLET | Refills: 0 | Status: SHIPPED | OUTPATIENT
Start: 2023-07-25

## 2023-07-26 ENCOUNTER — HOSPITAL ENCOUNTER (OUTPATIENT)
Dept: ULTRASOUND IMAGING | Age: 78
Discharge: HOME | End: 2023-07-26
Attending: SPECIALIST
Payer: COMMERCIAL

## 2023-07-26 DIAGNOSIS — K74.60 CIRRHOSIS OF LIVER WITH ASCITES, UNSPECIFIED HEPATIC CIRRHOSIS TYPE (HCC): ICD-10-CM

## 2023-07-26 DIAGNOSIS — R18.8 CIRRHOSIS OF LIVER WITH ASCITES, UNSPECIFIED HEPATIC CIRRHOSIS TYPE (HCC): ICD-10-CM

## 2023-07-26 PROCEDURE — 76705 ECHO EXAM OF ABDOMEN: CPT

## 2023-07-26 NOTE — PROGRESS NOTES
IR Procedure at [facility]:  left message for patient to arrive at 1000 at [facility] on 7/28/2023 for his procedure at 1130. Patient has been given below instructions. (Paracentesis, Thoracentesis, Thyroid Bx and Injections may have a light breakfast)  2. Follow your directions as prescribed by the doctor for your procedure and medications. 3.   Consult your provider as to when to stop blood thinner  4. Do not take any pain medication within 6 hours of your procedure  5. Do not drink any alcoholic beverages or use any street drugs 24 hours before procedure. 6.   Please wear simple, loose fitting clothing to the hospital.  Do not bring valuables (money,             credit cards, checkbooks, etc.)     7. If you  have a Living Will and Durable Power of  for Healthcare, please bring in a copy. 8.   Please bring picture ID,  insurance card, paperwork from the doctors office            (H & P, Consent,  & card for implantable devices). 9.   Report to the information desk on the ground floor. 10. Take a shower the night before or morning of your procedure, do not apply any lotion, oil or powder. 11. If you are going to be sedated for the procedure, you will need a responsible adult to drive you home.

## 2023-07-27 LAB
CULTURE: ABNORMAL
GRAM SMEAR: ABNORMAL
Lab: ABNORMAL
SPECIMEN: ABNORMAL

## 2023-07-28 ENCOUNTER — HOSPITAL ENCOUNTER (OUTPATIENT)
Dept: INTERVENTIONAL RADIOLOGY/VASCULAR | Age: 78
Discharge: HOME | End: 2023-07-28

## 2023-08-01 NOTE — PROGRESS NOTES
IR Procedure at [facility]: Left message for patient to arrive at 1300 at [facility] on 8/3/2023 for his procedure at 1430. Patient has been given below instructions. (Paracentesis, Thoracentesis, Thyroid Bx and Injections may have a light breakfast)  2. Follow your directions as prescribed by the doctor for your procedure and medications. 3.   Consult your provider as to when to stop blood thinner  4. Do not take any pain medication within 6 hours of your procedure  5. Do not drink any alcoholic beverages or use any street drugs 24 hours before procedure. 6.   Please wear simple, loose fitting clothing to the hospital.  Do not bring valuables (money,             credit cards, checkbooks, etc.)     7. If you  have a Living Will and Durable Power of  for Healthcare, please bring in a copy. 8.   Please bring picture ID,  insurance card, paperwork from the doctors office            (H & P, Consent,  & card for implantable devices). 9.   Report to the information desk on the ground floor. 10. Take a shower the night before or morning of your procedure, do not apply any lotion, oil or powder. 11. If you are going to be sedated for the procedure, you will need a responsible adult to drive you home.

## 2023-08-03 ENCOUNTER — HOSPITAL ENCOUNTER (OUTPATIENT)
Dept: INTERVENTIONAL RADIOLOGY/VASCULAR | Age: 78
Discharge: HOME | End: 2023-08-03
Payer: COMMERCIAL

## 2023-08-03 VITALS
BODY MASS INDEX: 34.1 KG/M2 | DIASTOLIC BLOOD PRESSURE: 60 MMHG | RESPIRATION RATE: 16 BRPM | WEIGHT: 225 LBS | HEIGHT: 68 IN | HEART RATE: 72 BPM | OXYGEN SATURATION: 98 % | TEMPERATURE: 97.7 F | SYSTOLIC BLOOD PRESSURE: 120 MMHG

## 2023-08-03 DIAGNOSIS — R18.8 OTHER ASCITES: ICD-10-CM

## 2023-08-03 PROCEDURE — P9047 ALBUMIN (HUMAN), 25%, 50ML: HCPCS

## 2023-08-03 PROCEDURE — 6360000002 HC RX W HCPCS: Performed by: SPECIALIST

## 2023-08-03 PROCEDURE — 49083 ABD PARACENTESIS W/IMAGING: CPT

## 2023-08-03 PROCEDURE — 2500000003 HC RX 250 WO HCPCS: Performed by: RADIOLOGY

## 2023-08-03 PROCEDURE — P9047 ALBUMIN (HUMAN), 25%, 50ML: HCPCS | Performed by: SPECIALIST

## 2023-08-03 PROCEDURE — 6360000002 HC RX W HCPCS

## 2023-08-03 PROCEDURE — C1729 CATH, DRAINAGE: HCPCS

## 2023-08-03 RX ORDER — ALBUMIN (HUMAN) 12.5 G/50ML
SOLUTION INTRAVENOUS CONTINUOUS PRN
Status: COMPLETED | OUTPATIENT
Start: 2023-08-03 | End: 2023-08-03

## 2023-08-03 RX ORDER — LIDOCAINE HYDROCHLORIDE 10 MG/ML
INJECTION, SOLUTION EPIDURAL; INFILTRATION; INTRACAUDAL; PERINEURAL PRN
Status: COMPLETED | OUTPATIENT
Start: 2023-08-03 | End: 2023-08-03

## 2023-08-03 RX ORDER — SODIUM CHLORIDE 0.9 % (FLUSH) 0.9 %
10 SYRINGE (ML) INJECTION PRN
Status: DISCONTINUED | OUTPATIENT
Start: 2023-08-03 | End: 2023-08-04 | Stop reason: HOSPADM

## 2023-08-03 RX ADMIN — Medication 10 ML: at 09:33

## 2023-08-03 RX ADMIN — ALBUMIN (HUMAN) 75 G: 0.25 INJECTION, SOLUTION INTRAVENOUS at 10:44

## 2023-08-03 RX ADMIN — LIDOCAINE HYDROCHLORIDE 10 ML: 10 INJECTION, SOLUTION EPIDURAL; INFILTRATION; INTRACAUDAL; PERINEURAL at 10:05

## 2023-08-03 ASSESSMENT — PAIN - FUNCTIONAL ASSESSMENT: PAIN_FUNCTIONAL_ASSESSMENT: 0-10

## 2023-08-03 ASSESSMENT — PAIN SCALES - GENERAL: PAINLEVEL_OUTOF10: 0

## 2023-08-03 NOTE — PROGRESS NOTES
1103 Pt. Brought back to unit, pt denies pain. VSS, DC instructions reviewed. 1124 Pt. To DC home in private vehicle.

## 2023-08-03 NOTE — DISCHARGE INSTRUCTIONS
[facility]    Patient Discharge Instructions Abdominal Fluid Drainage      You have had had an Abdominal Fluid Drainage in the Radiology Department. Below are guidelines for you to follow after your test is completed:    Go home and rest quietly for the remainder of the day. DO NOT drive, operate appliances, or make any legal decisions today. You may resume normal activities tomorrow. Have a responsible adult drive you home and remain with you for the remainder of the day. You may resume your normal diet after the procedure. Avoid alcoholic beverages and depressant drugs for 24 hours. You may resume your previous medications unless directed not to by your physician or the Radiologist. Tere Noble may use Tylenol (one or two tablets every four to six hours) for discomfort at the puncture site. If you develop severe pain, swelling, or redness at the site, call the Radiology Department. Call your physician immediately if you develop a rapid pulse (greater than 100 beats per minute), feel faint, sweat profusely, experience a sudden onset of weakness, or experience increased pain or swelling or saturation of dressing at the puncture site. Call your physician immediately if you develop a sudden onset of rapid breathing (greater than 20 breaths per minute), upper back or chest pain, shortness of breath, sweating, skin color change, or a sudden onset of anxiety. Report a temperature greater than 101 degrees Fahrenheit (38.8 degrees Celsius) to us or to your physician. Check the dressing throughout the day for an increase in drainage. Keep the dressing dry for 24 hours. Replace with a Band-Aid if necessary. You may shower 24 hours after the procedure. Do not smoke for 24 hours after the procedure. Call your physician for a follow-up appointment.   If any questions or complications develop after you go home, please call the Radiology Department at [phone]

## 2023-08-03 NOTE — PROGRESS NOTES
TRANSFER - OUT REPORT:    Verbal report given to JANNETH Sandoval on Vicki Hickman being transferred to Same day surgery for routine post-op       Report consisted of patient's Situation, Background, Assessment and   Recommendations(SBAR). Information from the following report(s) Nurse Handoff Report was reviewed with the receiving nurse. Opportunity for questions and clarification was provided. 12,300cc of ascitic fluid removed.      Patient transported with:   Registered Nurse

## 2023-08-03 NOTE — H&P
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 Initially treated with vancomycin but had a supratherapeutic level, hence, it was substituted by daptomycin. Cumulative 6-week course of therapy will be completed on 3/13/2023. Anxiety     Chronic heart failure, unspecified heart failure type ([address] ) 2023    Cirrhosis, alcoholic (HCC)     Diabetes mellitus ([address] [facility])     GERD (gastroesophageal reflux disease)     GERD (gastroesophageal reflux disease)     Hyperlipidemia     Hypertension     Meniere's disease     Portal hypertension (HCC)     Pulmonary nodules     Secondary esophageal varices without bleeding (HCC)     Sleep apnea     SOB (shortness of breath)     Thyroid disease        Past Surgical History:  Past Surgical History:   Procedure Laterality Date    APPENDECTOMY      CHOLECYSTECTOMY      COLONOSCOPY      ENDOSCOPY, COLON, DIAGNOSTIC      FOOT SURGERY      needle removed,not sure which foot, per wife.     TONSILLECTOMY      UPPER GASTROINTESTINAL ENDOSCOPY N/A 2022    EGD BIOPSY performed by Ole Goldmann, MD at [address] [place] [address] Drive History:  Social History     Socioeconomic History    Marital status:      Spouse name: Not on file    Number of children: Not on file    Years of education: Not on file    Highest education level: Not on file   Occupational History     Comment: Retired    Tobacco Use    Smoking status: Former     Packs/day: 1.00     Years: 14.00     Pack years: 14.00     Types: Cigarettes     Quit date:      Years since quittin.6    Smokeless tobacco:

## 2023-08-08 NOTE — PROGRESS NOTES
.IR Procedure at [facility]:  8/10/23 1430 arrival 1300    NPO at Midnight     (Paracentesis, Thoracentesis, Thyroid Bx and Injections may have a light breakfast)  2. Follow your directions as prescribed by the doctor for your procedure and medications. 3.   Consult your provider as to when to stop blood thinner  4. Do not take any pain medication within 6 hours of your procedure  5. Do not drink any alcoholic beverages or use any street drugs 24 hours before procedure. 6.   Please wear simple, loose fitting clothing to the hospital.  Do not bring valuables (money,             credit cards, checkbooks, etc.)     7. If you  have a Living Will and Durable Power of  for Healthcare, please bring in a copy. 8.   Please bring picture ID,  insurance card, paperwork from the doctors office            (H & P, Consent,  & card for implantable devices). 9.   Report to the information desk on the ground floor. 10. Take a shower the night before or morning of your procedure, do not apply any lotion, oil or powder. 11. If you are going to be sedated for the procedure, you will need a responsible adult to drive you home.

## 2023-08-10 ENCOUNTER — HOSPITAL ENCOUNTER (OUTPATIENT)
Dept: INTERVENTIONAL RADIOLOGY/VASCULAR | Age: 78
Discharge: HOME | End: 2023-08-10
Payer: COMMERCIAL

## 2023-08-10 VITALS
DIASTOLIC BLOOD PRESSURE: 67 MMHG | HEIGHT: 68 IN | OXYGEN SATURATION: 99 % | BODY MASS INDEX: 33.95 KG/M2 | HEART RATE: 76 BPM | TEMPERATURE: 97 F | RESPIRATION RATE: 18 BRPM | WEIGHT: 224 LBS | SYSTOLIC BLOOD PRESSURE: 117 MMHG

## 2023-08-10 DIAGNOSIS — R18.8 OTHER ASCITES: ICD-10-CM

## 2023-08-10 DIAGNOSIS — J90 RECURRENT RIGHT PLEURAL EFFUSION: ICD-10-CM

## 2023-08-10 LAB
FLUID TYPE: NORMAL INDEX
LYMPHOCYTES, BODY FLUID: 26 %
MESOTHELIAL FLUID: 0 /100 WBC
MONOCYTE, FLUID: 66 %
NEUTROPHIL, FLUID: 7 %
OTHER CELLS FLUID: 1
RBC FLUID: 6000 /CU MM
WBC FLUID: 123 /CU MM

## 2023-08-10 PROCEDURE — 2580000003 HC RX 258

## 2023-08-10 PROCEDURE — 6360000002 HC RX W HCPCS

## 2023-08-10 PROCEDURE — P9047 ALBUMIN (HUMAN), 25%, 50ML: HCPCS

## 2023-08-10 PROCEDURE — 89051 BODY FLUID CELL COUNT: CPT

## 2023-08-10 PROCEDURE — P9047 ALBUMIN (HUMAN), 25%, 50ML: HCPCS | Performed by: SPECIALIST

## 2023-08-10 PROCEDURE — 6360000002 HC RX W HCPCS: Performed by: SPECIALIST

## 2023-08-10 PROCEDURE — 87070 CULTURE OTHR SPECIMN AEROBIC: CPT

## 2023-08-10 PROCEDURE — 87205 SMEAR GRAM STAIN: CPT

## 2023-08-10 PROCEDURE — 2709999900 IR US GUIDED PARACENTESIS

## 2023-08-10 RX ORDER — ALBUMIN (HUMAN) 12.5 G/50ML
SOLUTION INTRAVENOUS CONTINUOUS PRN
Status: COMPLETED | OUTPATIENT
Start: 2023-08-10 | End: 2023-08-10

## 2023-08-10 RX ORDER — SODIUM CHLORIDE 0.9 % (FLUSH) 0.9 %
10 SYRINGE (ML) INJECTION PRN
Status: DISCONTINUED | OUTPATIENT
Start: 2023-08-10 | End: 2023-08-11 | Stop reason: HOSPADM

## 2023-08-10 RX ADMIN — ALBUMIN (HUMAN) 75 G: 0.25 INJECTION, SOLUTION INTRAVENOUS at 15:51

## 2023-08-10 RX ADMIN — Medication 10 ML: at 13:33

## 2023-08-10 ASSESSMENT — PAIN - FUNCTIONAL ASSESSMENT: PAIN_FUNCTIONAL_ASSESSMENT: NONE - DENIES PAIN

## 2023-08-10 ASSESSMENT — PAIN SCALES - GENERAL: PAINLEVEL_OUTOF10: 0

## 2023-08-10 NOTE — PROGRESS NOTES
TRANSFER - OUT REPORT:    Verbal report given to beside RN on Jonathan Quevedo being transferred to Same day surgery for routine post-op       Report consisted of patient's Situation, Background, Assessment and   Recommendations(SBAR). Information from the following report(s) Nurse Handoff Report was reviewed with the receiving nurse. 49382 cc ascitic fluid removed from abdomen. Opportunity for questions and clarification was provided.       Patient transported with:   Registered Nurse

## 2023-08-10 NOTE — PROGRESS NOTES
1630:  Discharge instructions discussed with patient, verbalized an understanding. Pt discharged to home via wheelchair alert and oriented with no c/o RLQ discomfort. Puncture site to RLQ covered with gauze/opsite drsg, no bleeding or drainage noted. Pt tolerating PO, VSS.

## 2023-08-13 LAB
CULTURE: ABNORMAL
GRAM SMEAR: ABNORMAL
Lab: ABNORMAL
SPECIMEN: ABNORMAL

## 2023-08-14 RX ORDER — FUROSEMIDE 40 MG/1
40 TABLET ORAL DAILY
Qty: 60 TABLET | Refills: 3 | Status: SHIPPED | OUTPATIENT
Start: 2023-08-14

## 2023-08-14 RX ORDER — FUROSEMIDE 40 MG/1
TABLET ORAL
Qty: 60 TABLET | Refills: 0 | OUTPATIENT
Start: 2023-08-14

## 2023-08-15 NOTE — PROGRESS NOTES
IR Procedure at [facility]:  Left message  for patient to arrive at 1200 at [facility] on 8/17/2023 for his procedure at 1330. Patient has been given below instructions. (Paracentesis, Thoracentesis, Thyroid Bx and Injections may have a light breakfast)  2. Follow your directions as prescribed by the doctor for your procedure and medications. 3.   Consult your provider as to when to stop blood thinner  4. Do not take any pain medication within 6 hours of your procedure  5. Do not drink any alcoholic beverages or use any street drugs 24 hours before procedure. 6.   Please wear simple, loose fitting clothing to the hospital.  Do not bring valuables (money,             credit cards, checkbooks, etc.)     7. If you  have a Living Will and Durable Power of  for Healthcare, please bring in a copy. 8.   Please bring picture ID,  insurance card, paperwork from the doctors office            (H & P, Consent,  & card for implantable devices). 9.   Report to the information desk on the ground floor. 10. Take a shower the night before or morning of your procedure, do not apply any lotion, oil or powder. 11. If you are going to be sedated for the procedure, you will need a responsible adult to drive you home.

## 2023-08-17 ENCOUNTER — HOSPITAL ENCOUNTER (OUTPATIENT)
Dept: INTERVENTIONAL RADIOLOGY/VASCULAR | Age: 78
Discharge: HOME | End: 2023-08-17
Payer: COMMERCIAL

## 2023-08-17 VITALS
DIASTOLIC BLOOD PRESSURE: 63 MMHG | OXYGEN SATURATION: 100 % | SYSTOLIC BLOOD PRESSURE: 131 MMHG | TEMPERATURE: 96.8 F | HEART RATE: 70 BPM | RESPIRATION RATE: 16 BRPM

## 2023-08-17 DIAGNOSIS — R18.8 OTHER ASCITES: ICD-10-CM

## 2023-08-17 LAB
FLUID TYPE: NORMAL INDEX
GLUCOSE, FLUID: 114 MG/DL
LACTATE DEHYDROGENASE, FLUID: 36 IU/L
LYMPHOCYTES, BODY FLUID: 16 %
MESOTHELIAL FLUID: 0 /100 WBC
MONOCYTE, FLUID: 72 %
NEUTROPHIL, FLUID: 12 %
OTHER CELLS FLUID: 0
PROTEIN FLUID: 1.3 G/DL
RBC FLUID: 109 /CU MM
WBC FLUID: 177 /CU MM

## 2023-08-17 PROCEDURE — 6360000002 HC RX W HCPCS

## 2023-08-17 PROCEDURE — 2580000003 HC RX 258

## 2023-08-17 PROCEDURE — 6360000002 HC RX W HCPCS: Performed by: RADIOLOGY

## 2023-08-17 PROCEDURE — 84157 ASSAY OF PROTEIN OTHER: CPT

## 2023-08-17 PROCEDURE — 82945 GLUCOSE OTHER FLUID: CPT

## 2023-08-17 PROCEDURE — 87205 SMEAR GRAM STAIN: CPT

## 2023-08-17 PROCEDURE — 2709999900 IR US GUIDED PARACENTESIS

## 2023-08-17 PROCEDURE — 2500000003 HC RX 250 WO HCPCS: Performed by: RADIOLOGY

## 2023-08-17 PROCEDURE — P9047 ALBUMIN (HUMAN), 25%, 50ML: HCPCS | Performed by: RADIOLOGY

## 2023-08-17 PROCEDURE — 89051 BODY FLUID CELL COUNT: CPT

## 2023-08-17 PROCEDURE — 83615 LACTATE (LD) (LDH) ENZYME: CPT

## 2023-08-17 PROCEDURE — 49083 ABD PARACENTESIS W/IMAGING: CPT

## 2023-08-17 PROCEDURE — 87070 CULTURE OTHR SPECIMN AEROBIC: CPT

## 2023-08-17 PROCEDURE — P9047 ALBUMIN (HUMAN), 25%, 50ML: HCPCS

## 2023-08-17 RX ORDER — LIDOCAINE HYDROCHLORIDE 10 MG/ML
INJECTION, SOLUTION EPIDURAL; INFILTRATION; INTRACAUDAL; PERINEURAL PRN
Status: COMPLETED | OUTPATIENT
Start: 2023-08-17 | End: 2023-08-17

## 2023-08-17 RX ORDER — ALBUMIN (HUMAN) 12.5 G/50ML
SOLUTION INTRAVENOUS CONTINUOUS PRN
Status: COMPLETED | OUTPATIENT
Start: 2023-08-17 | End: 2023-08-17

## 2023-08-17 RX ORDER — SODIUM CHLORIDE 0.9 % (FLUSH) 0.9 %
10 SYRINGE (ML) INJECTION PRN
Status: DISCONTINUED | OUTPATIENT
Start: 2023-08-17 | End: 2023-08-18 | Stop reason: HOSPADM

## 2023-08-17 RX ADMIN — LIDOCAINE HYDROCHLORIDE 10 ML: 10 INJECTION, SOLUTION EPIDURAL; INFILTRATION; INTRACAUDAL; PERINEURAL at 13:56

## 2023-08-17 RX ADMIN — Medication 10 ML: at 13:27

## 2023-08-17 RX ADMIN — ALBUMIN (HUMAN) 62 G: 0.25 INJECTION, SOLUTION INTRAVENOUS at 14:29

## 2023-08-17 ASSESSMENT — PAIN SCALES - GENERAL: PAINLEVEL_OUTOF10: 0

## 2023-08-17 NOTE — PROGRESS NOTES
TRANSFER - OUT REPORT:    Verbal report given to [facility] JOSE RN on Lemuel Waldron being transferred to [address] [place] [address] (unit) for routine post-op       Report consisted of patient's Situation, Background, Assessment and   Recommendations(SBAR). Information from the following report(s) Nurse Handoff Report was reviewed with the receiving nurse. Opportunity for questions and clarification was provided.       Patient transported with:   Registered Nurse

## 2023-08-17 NOTE — DISCHARGE INSTRUCTIONS
[facility]    Patient Discharge Instructions Abdominal Fluid Drainage      You have had had an Abdominal Fluid Drainage in the Radiology Department. Below are guidelines for you to follow after your test is completed:    Go home and rest quietly for the remainder of the day. DO NOT drive, operate appliances, or make any legal decisions today. You may resume normal activities tomorrow. Have a responsible adult drive you home and remain with you for the remainder of the day. You may resume your normal diet after the procedure. Avoid alcoholic beverages and depressant drugs for 24 hours. You may resume your previous medications unless directed not to by your physician or the Radiologist. [place] Dang may use Tylenol (one or two tablets every four to six hours) for discomfort at the puncture site. If you develop severe pain, swelling, or redness at the site, call the Radiology Department. Call your physician immediately if you develop a rapid pulse (greater than 100 beats per minute), feel faint, sweat profusely, experience a sudden onset of weakness, or experience increased pain or swelling or saturation of dressing at the puncture site. Call your physician immediately if you develop a sudden onset of rapid breathing (greater than 20 breaths per minute), upper back or chest pain, shortness of breath, sweating, skin color change, or a sudden onset of anxiety. Report a temperature greater than 101 degrees Fahrenheit (38.8 degrees Celsius) to us or to your physician. Check the dressing throughout the day for an increase in drainage. Keep the dressing dry for 24 hours. Replace with a Band-Aid if necessary. You may shower 24 hours after the procedure. Do not smoke for 24 hours after the procedure. Call your physician for a follow-up appointment.   If any questions or complications develop after you go home, please call the Radiology Department at [phone]

## 2023-08-18 NOTE — PROGRESS NOTES
Hospitalist Progress Note      Name:  Ashlyn Bello /Age/Sex: 1945  (78 y.o. male)   MRN & CSN:  [id number] & [id number] Admission Date/Time: 2022 12:00 PM   Location:  -A PCP: Chris Pollock, [address] Day: 14    Assessment and Plan:   Ashlyn Bello is a 78 y.o.  male  who presents with Pleural effusion    1) Acute Hypoxic Respiratory Failure  -Likely due to large Rt sided pleural effusion as well as Rhinovirus  -Mechanical intubation on 2022, extubated on 2022  -S/P Rt thoracentesis with removal of 2L of fluid 22  -S/P right thoracocentesis with removal of 1.4 L of fluid on 2022  -Per Pulm, fluid seems to be due to ascites  -Pulm on board, might need pleurodesis if recurrent    2) Decompensated Alcoholic liver cirrhosis with ascites  -S/P Paracentesis with drainage of 6.9L on , analysis consistent with portal HTN due to liver cirrhosis. Neg for SBP.  -Continue lasix and Aldactone  -GI on board, might need TIPS procedure if Rt sided pleural effusion continues and due to hepatic hydothorax  -Continue to monitor    3) Acute Hepatic Encephalopathy  -Continue Lactulose and Rifaximin  -Aim to achieve 2-3 BM daily  -Mentation improving    4) Hypotension  -Likely due to third spacing  -Improved, Weaned off pressor     Other chronic medical conditions: Medication resumed unless contraindicated    CKD stage III  History of hypothyroidism  Morbid obesity      Diet ADULT DIET;  Dysphagia - Soft and Bite Sized; Mildly Thick ([place])  ADULT ORAL NUTRITION SUPPLEMENT; Lunch, Dinner; Frozen Oral Supplement   DVT Prophylaxis [] Lovenox, []  Heparin, [] SCDs, [] Ambulation   GI Prophylaxis [] PPI,  [] H2 Blocker,  [] Carafate,  [] Diet/Tube Feeds   Code Status Full Code   Disposition TBD   MDM      History of Present Illness:     Patient was seen and examined  Mentation slowly improving  Denied any chest pain, SOB  No acute event overnight      Objective: Intake/Output Summary (Last 24 hours) at 5/15/2022 0915  Last data filed at 5/15/2022 0626  Gross per 24 hour   Intake 480 ml   Output 1670 ml   Net -1190 ml      Vitals:   Vitals:    05/15/22 0843   BP: (!) 156/83   Pulse: 99   Resp: 16   Temp:    SpO2: 93%     Physical Exam:   GEN Awake and alert   EYES Pupils are equally round. No scleral erythema, discharge, or conjunctivitis. HENT Mucous membranes are moist. Oral pharynx without exudates, no evidence of thrush. NECK Supple, no apparent thyromegaly or masses. RESP Clear to auscultation, no wheezes, rales or rhonchi. Symmetric chest movement while on room air  CARDIO/VASC S1/S2 auscultated. Regular rate without appreciable murmurs, rubs, or gallops. No JVD or carotid bruits. Peripheral pulses equal bilaterally and palpable. No peripheral edema. GI Abdomen is soft without significant tenderness, masses, or guarding. Bowel sounds are normoactive. Rectal exam deferred.  No costovertebral angle tenderness. Schilling catheter is present. HEME/LYMPH No palpable cervical lymphadenopathy and no hepatosplenomegaly. No petechiae or ecchymoses. MSK No gross joint deformities. SKIN Normal coloration, warm, dry.   NEURO No focal deficit, delirious    Medications:   Medications:    hydrocortisone  25 mg Rectal BID    insulin lispro  0-12 Units SubCUTAneous TID WC    insulin lispro  0-6 Units SubCUTAneous 2 times per day    furosemide  40 mg IntraVENous BID    sodium chloride  1 g Oral TID    enoxaparin  40 mg SubCUTAneous Daily    lidocaine PF  5 mL IntraDERmal Once    sodium chloride flush  5-40 mL IntraVENous 2 times per day    DULoxetine  60 mg Oral Daily    lactulose  20 g Oral TID    levothyroxine  50 mcg Oral Daily    magnesium oxide  200 mg Oral BID    [Held by provider] nadolol  40 mg Oral Daily    rifAXIMin  550 mg Oral BID    spironolactone  100 mg Oral Daily      Infusions:    sodium chloride Stopped (05/06/22 1431)    norepinephrine Stopped (05/11/22 1030)    sodium chloride Stopped (05/10/22 0144)    sodium chloride       PRN Meds: potassium chloride, 20 mEq, PRN   Or  potassium chloride, 10 mEq, PRN  fentaNYL, 25 mcg, Q2H PRN  magnesium sulfate, 1,000 mg, PRN  sodium chloride flush, 5-40 mL, PRN  sodium chloride, , PRN  sodium phosphate IVPB, 15 mmol, PRN   Or  sodium phosphate IVPB, 15 mmol, PRN  sodium chloride flush, 5-40 mL, PRN  sodium chloride, , PRN  albuterol sulfate HFA, 2 puff, Q4H PRN  sodium chloride flush, 10 mL, PRN  sodium chloride, , PRN  nicotine polacrilex, 2 mg, Q1H PRN  acetaminophen, 650 mg, Q6H PRN   Or  acetaminophen, 650 mg, Q6H PRN  ondansetron, 4 mg, Q6H PRN  ALPRAZolam, 0.5 mg, Daily PRN          Electronically signed by Ulysees Heimlich, MD on 5/15/2022 at 9:15 AM Ears: no ear pain and no hearing problems. Nose: no nasal congestion and no nasal drainage. Mouth/Throat: no dysphagia, no hoarseness and no throat pain. Neck: no lumps, no pain, no stiffness and no swollen glands.

## 2023-08-18 NOTE — PROGRESS NOTES
IR Procedure at [facility]:  Left message for patient to arrive at 1000 at [facility] on 8/24/2023 for his procedure at 1130. Patient has been given below instructions. (Paracentesis, Thoracentesis, Thyroid Bx and Injections may have a light breakfast)  2. Follow your directions as prescribed by the doctor for your procedure and medications. 3.   Consult your provider as to when to stop blood thinner  4. Do not take any pain medication within 6 hours of your procedure  5. Do not drink any alcoholic beverages or use any street drugs 24 hours before procedure. 6.   Please wear simple, loose fitting clothing to the hospital.  Do not bring valuables (money,             credit cards, checkbooks, etc.)     7. If you  have a Living Will and Durable Power of  for Healthcare, please bring in a copy. 8.   Please bring picture ID,  insurance card, paperwork from the doctors office            (H & P, Consent,  & card for implantable devices). 9.   Report to the information desk on the ground floor. 10. Take a shower the night before or morning of your procedure, do not apply any lotion, oil or powder. 11. If you are going to be sedated for the procedure, you will need a responsible adult to drive you home.

## 2023-08-20 LAB
CULTURE: ABNORMAL
GRAM SMEAR: ABNORMAL
Lab: ABNORMAL
SPECIMEN: ABNORMAL

## 2023-08-24 ENCOUNTER — HOSPITAL ENCOUNTER (OUTPATIENT)
Dept: INTERVENTIONAL RADIOLOGY/VASCULAR | Age: 78
Discharge: HOME | End: 2023-08-24
Payer: COMMERCIAL

## 2023-08-24 VITALS
DIASTOLIC BLOOD PRESSURE: 66 MMHG | HEIGHT: 68 IN | BODY MASS INDEX: 34.1 KG/M2 | TEMPERATURE: 98 F | SYSTOLIC BLOOD PRESSURE: 114 MMHG | OXYGEN SATURATION: 98 % | HEART RATE: 69 BPM | WEIGHT: 225 LBS

## 2023-08-24 DIAGNOSIS — R18.8 OTHER ASCITES: ICD-10-CM

## 2023-08-24 LAB
APTT: 31.6 SECONDS (ref 25.1–37.1)
FLUID TYPE: NORMAL INDEX
HCT VFR BLD CALC: 32.9 % (ref 42–52)
HEMOGLOBIN: 10.1 GM/DL (ref 13.5–18)
INR BLD: 1.1 INDEX
LYMPHOCYTES, BODY FLUID: 19 %
MCH RBC QN AUTO: 30.3 PG (ref 27–31)
MCHC RBC AUTO-ENTMCNC: 30.7 % (ref 32–36)
MCV RBC AUTO: 98.8 FL (ref 78–100)
MESOTHELIAL FLUID: 6 /100 WBC
MONOCYTE, FLUID: 72 %
NEUTROPHIL, FLUID: 6 %
OTHER CELLS FLUID: 3
PDW BLD-RTO: 16.1 % (ref 11.7–14.9)
PLATELET # BLD: 222 K/CU MM (ref 140–440)
PMV BLD AUTO: 10 FL (ref 7.5–11.1)
PROTHROMBIN TIME: 14.8 SECONDS (ref 11.7–14.5)
RBC # BLD: 3.33 M/CU MM (ref 4.6–6.2)
RBC FLUID: 3000 /CU MM
WBC # BLD: 6.8 K/CU MM (ref 4–10.5)
WBC FLUID: 201 /CU MM

## 2023-08-24 PROCEDURE — 6360000002 HC RX W HCPCS

## 2023-08-24 PROCEDURE — 85730 THROMBOPLASTIN TIME PARTIAL: CPT

## 2023-08-24 PROCEDURE — 87205 SMEAR GRAM STAIN: CPT

## 2023-08-24 PROCEDURE — 6360000002 HC RX W HCPCS: Performed by: SPECIALIST

## 2023-08-24 PROCEDURE — 49083 ABD PARACENTESIS W/IMAGING: CPT

## 2023-08-24 PROCEDURE — P9047 ALBUMIN (HUMAN), 25%, 50ML: HCPCS | Performed by: SPECIALIST

## 2023-08-24 PROCEDURE — 2580000003 HC RX 258

## 2023-08-24 PROCEDURE — 2709999900 IR US GUIDED PARACENTESIS

## 2023-08-24 PROCEDURE — 87070 CULTURE OTHR SPECIMN AEROBIC: CPT

## 2023-08-24 PROCEDURE — 2500000003 HC RX 250 WO HCPCS: Performed by: RADIOLOGY

## 2023-08-24 PROCEDURE — P9047 ALBUMIN (HUMAN), 25%, 50ML: HCPCS

## 2023-08-24 PROCEDURE — 89051 BODY FLUID CELL COUNT: CPT

## 2023-08-24 PROCEDURE — 85610 PROTHROMBIN TIME: CPT

## 2023-08-24 PROCEDURE — 85027 COMPLETE CBC AUTOMATED: CPT

## 2023-08-24 RX ORDER — LIDOCAINE HYDROCHLORIDE 10 MG/ML
INJECTION, SOLUTION EPIDURAL; INFILTRATION; INTRACAUDAL; PERINEURAL PRN
Status: COMPLETED | OUTPATIENT
Start: 2023-08-24 | End: 2023-08-24

## 2023-08-24 RX ORDER — ALBUMIN (HUMAN) 12.5 G/50ML
SOLUTION INTRAVENOUS CONTINUOUS PRN
Status: COMPLETED | OUTPATIENT
Start: 2023-08-24 | End: 2023-08-24

## 2023-08-24 RX ORDER — SODIUM CHLORIDE 0.9 % (FLUSH) 0.9 %
10 SYRINGE (ML) INJECTION PRN
Status: DISCONTINUED | OUTPATIENT
Start: 2023-08-24 | End: 2023-08-25 | Stop reason: HOSPADM

## 2023-08-24 RX ADMIN — LIDOCAINE HYDROCHLORIDE 10 ML: 10 INJECTION, SOLUTION EPIDURAL; INFILTRATION; INTRACAUDAL; PERINEURAL at 11:09

## 2023-08-24 RX ADMIN — ALBUMIN (HUMAN) 12.5 G: 0.25 INJECTION, SOLUTION INTRAVENOUS at 12:05

## 2023-08-24 NOTE — PROGRESS NOTES
IR Procedure at [facility]:  Left message for patient to arrive at 1000 at [facility] on 8/31/2023 for his procedure at 1130. Patient has been given below instructions. (Paracentesis, Thoracentesis, Thyroid Bx and Injections may have a light breakfast)  2. Follow your directions as prescribed by the doctor for your procedure and medications. 3.   Consult your provider as to when to stop blood thinner  4. Do not take any pain medication within 6 hours of your procedure  5. Do not drink any alcoholic beverages or use any street drugs 24 hours before procedure. 6.   Please wear simple, loose fitting clothing to the hospital.  Do not bring valuables (money,             credit cards, checkbooks, etc.)     7. If you  have a Living Will and Durable Power of  for Healthcare, please bring in a copy. 8.   Please bring picture ID,  insurance card, paperwork from the doctors office            (H & P, Consent,  & card for implantable devices). 9.   Report to the information desk on the ground floor. 10. Take a shower the night before or morning of your procedure, do not apply any lotion, oil or powder. 11. If you are going to be sedated for the procedure, you will need a responsible adult to drive you home.

## 2023-08-24 NOTE — PROGRESS NOTES
TRANSFER - OUT REPORT:    Verbal report given to [facility] on Ashley Sena being transferred to Cath Lab holding for routine post-op       Report consisted of patient's Situation, Background, Assessment and   Recommendations(SBAR). Pt had 6100 CC of tan cloudy ascitic fluid. Information from the following report(s) Nurse Handoff Report was reviewed with the receiving nurse. Opportunity for questions and clarification was provided.       Patient transported with:   Registered Nurse

## 2023-08-24 NOTE — PROGRESS NOTES
Pt received 25 g of ordered albumin, IV infiltrated, multiple attempts to gain IV access were attempted, patient refusing further attempts. BP within normal limits.

## 2023-08-24 NOTE — PROGRESS NOTES
Discharge eduction complete with patient, Band aid in place clean dry and intact. VSS, pt denies further questions.

## 2023-08-27 LAB
CULTURE: NORMAL
Lab: NORMAL
SPECIMEN: NORMAL

## 2023-08-29 LAB
CULTURE: ABNORMAL
GRAM SMEAR: ABNORMAL
Lab: ABNORMAL
SPECIMEN: ABNORMAL

## 2023-08-31 ENCOUNTER — HOSPITAL ENCOUNTER (OUTPATIENT)
Dept: INTERVENTIONAL RADIOLOGY/VASCULAR | Age: 78
Discharge: HOME | End: 2023-08-31
Payer: COMMERCIAL

## 2023-08-31 VITALS
DIASTOLIC BLOOD PRESSURE: 58 MMHG | BODY MASS INDEX: 34.1 KG/M2 | HEIGHT: 68 IN | HEART RATE: 67 BPM | SYSTOLIC BLOOD PRESSURE: 125 MMHG | WEIGHT: 225 LBS | RESPIRATION RATE: 16 BRPM | OXYGEN SATURATION: 98 % | TEMPERATURE: 96.3 F

## 2023-08-31 DIAGNOSIS — R18.8 OTHER ASCITES: ICD-10-CM

## 2023-08-31 LAB
FLUID TYPE: NORMAL INDEX
LYMPHOCYTES, BODY FLUID: 64 %
MESOTHELIAL FLUID: 12 /100 WBC
MONOCYTE, FLUID: 15 %
NEUTROPHIL, FLUID: 9 %
RBC FLUID: 4000 /CU MM
WBC FLUID: 119 /CU MM

## 2023-08-31 PROCEDURE — 87205 SMEAR GRAM STAIN: CPT

## 2023-08-31 PROCEDURE — 2709999900 IR US GUIDED PARACENTESIS

## 2023-08-31 PROCEDURE — 6360000002 HC RX W HCPCS

## 2023-08-31 PROCEDURE — 6360000002 HC RX W HCPCS: Performed by: SPECIALIST

## 2023-08-31 PROCEDURE — P9047 ALBUMIN (HUMAN), 25%, 50ML: HCPCS | Performed by: SPECIALIST

## 2023-08-31 PROCEDURE — 87075 CULTR BACTERIA EXCEPT BLOOD: CPT

## 2023-08-31 PROCEDURE — 87070 CULTURE OTHR SPECIMN AEROBIC: CPT

## 2023-08-31 PROCEDURE — P9047 ALBUMIN (HUMAN), 25%, 50ML: HCPCS

## 2023-08-31 PROCEDURE — 89051 BODY FLUID CELL COUNT: CPT

## 2023-08-31 PROCEDURE — 49083 ABD PARACENTESIS W/IMAGING: CPT

## 2023-08-31 RX ORDER — SODIUM CHLORIDE 0.9 % (FLUSH) 0.9 %
10 SYRINGE (ML) INJECTION PRN
Status: DISCONTINUED | OUTPATIENT
Start: 2023-08-31 | End: 2023-09-01 | Stop reason: HOSPADM

## 2023-08-31 RX ORDER — ALBUMIN (HUMAN) 12.5 G/50ML
SOLUTION INTRAVENOUS CONTINUOUS PRN
Status: COMPLETED | OUTPATIENT
Start: 2023-08-31 | End: 2023-08-31

## 2023-08-31 RX ADMIN — ALBUMIN (HUMAN) 25 G: 0.25 INJECTION, SOLUTION INTRAVENOUS at 13:19

## 2023-08-31 NOTE — PROGRESS NOTES
TRANSFER - OUT REPORT:    Verbal report given to [facility] MARNIE CLARK RN on Luis Antonio Alonso being transferred to [facility](unit) for routine post-op       Report consisted of patient's Situation, Background, Assessment and   Recommendations(SBAR). Information from the following report(s) Nurse Handoff Report was reviewed with the receiving nurse. Opportunity for questions and clarification was provided.       Patient transported with:   Registered Nurse      4750 ml cloudy white ascitic fluid from paracentesis

## 2023-08-31 NOTE — PROGRESS NOTES
Hr 63 ur1701 bp 119/84, drsg dry and intact .   Pt changed into clothes escorted to main entrance for dc home with family

## 2023-09-03 LAB
CULTURE: NORMAL
Lab: NORMAL
SPECIMEN: NORMAL

## 2023-09-05 RX ORDER — SPIRONOLACTONE 100 MG/1
TABLET, FILM COATED ORAL
Qty: 60 TABLET | Refills: 0 | Status: SHIPPED | OUTPATIENT
Start: 2023-09-05

## 2023-09-06 LAB
CULTURE: ABNORMAL
GRAM SMEAR: ABNORMAL
Lab: ABNORMAL
SPECIMEN: ABNORMAL

## 2023-09-06 NOTE — PROGRESS NOTES
1110-Discharge instructions reviewed with patient per Raphael Esqeuda RN. Voices understanding.
1125-Patient to exit via wheelchair for home discharge to private residence.
Received from IR lab. Monitor and alarms on. Call light within reach.
Statement Selected

## 2023-09-11 NOTE — PROGRESS NOTES
.IR Procedure at [facility]:  9/14/23 1330 arrival 1200    NPO at Midnight     (Paracentesis, Thoracentesis, Thyroid Bx and Injections may have a light breakfast)  2. Follow your directions as prescribed by the doctor for your procedure and medications. 3.   Consult your provider as to when to stop blood thinner  4. Do not take any pain medication within 6 hours of your procedure  5. Do not drink any alcoholic beverages or use any street drugs 24 hours before procedure. 6.   Please wear simple, loose fitting clothing to the hospital.  Do not bring valuables (money,             credit cards, checkbooks, etc.)     7. If you  have a Living Will and Durable Power of  for Healthcare, please bring in a copy. 8.   Please bring picture ID,  insurance card, paperwork from the doctors office            (H & P, Consent,  & card for implantable devices). 9.   Report to the information desk on the ground floor. 10. Take a shower the night before or morning of your procedure, do not apply any lotion, oil or powder. 11. If you are going to be sedated for the procedure, you will need a responsible adult to drive you home.

## 2023-09-14 ENCOUNTER — HOSPITAL ENCOUNTER (OUTPATIENT)
Dept: INTERVENTIONAL RADIOLOGY/VASCULAR | Age: 78
Discharge: HOME | End: 2023-09-14
Payer: COMMERCIAL

## 2023-09-14 VITALS
DIASTOLIC BLOOD PRESSURE: 55 MMHG | SYSTOLIC BLOOD PRESSURE: 116 MMHG | OXYGEN SATURATION: 98 % | HEART RATE: 78 BPM | TEMPERATURE: 98 F

## 2023-09-14 DIAGNOSIS — R18.8 OTHER ASCITES: ICD-10-CM

## 2023-09-14 PROCEDURE — 87070 CULTURE OTHR SPECIMN AEROBIC: CPT

## 2023-09-14 PROCEDURE — 6360000002 HC RX W HCPCS

## 2023-09-14 PROCEDURE — 6360000002 HC RX W HCPCS: Performed by: RADIOLOGY

## 2023-09-14 PROCEDURE — 2500000003 HC RX 250 WO HCPCS: Performed by: RADIOLOGY

## 2023-09-14 PROCEDURE — 2580000003 HC RX 258

## 2023-09-14 PROCEDURE — P9047 ALBUMIN (HUMAN), 25%, 50ML: HCPCS | Performed by: RADIOLOGY

## 2023-09-14 PROCEDURE — 49083 ABD PARACENTESIS W/IMAGING: CPT

## 2023-09-14 PROCEDURE — 87205 SMEAR GRAM STAIN: CPT

## 2023-09-14 PROCEDURE — 2709999900 IR US GUIDED PARACENTESIS

## 2023-09-14 PROCEDURE — P9047 ALBUMIN (HUMAN), 25%, 50ML: HCPCS

## 2023-09-14 RX ORDER — LIDOCAINE HYDROCHLORIDE 10 MG/ML
INJECTION, SOLUTION EPIDURAL; INFILTRATION; INTRACAUDAL; PERINEURAL PRN
Status: COMPLETED | OUTPATIENT
Start: 2023-09-14 | End: 2023-09-14

## 2023-09-14 RX ORDER — ALBUMIN (HUMAN) 12.5 G/50ML
SOLUTION INTRAVENOUS CONTINUOUS PRN
Status: COMPLETED | OUTPATIENT
Start: 2023-09-14 | End: 2023-09-14

## 2023-09-14 RX ORDER — SODIUM CHLORIDE 0.9 % (FLUSH) 0.9 %
10 SYRINGE (ML) INJECTION PRN
Status: DISCONTINUED | OUTPATIENT
Start: 2023-09-14 | End: 2023-09-15 | Stop reason: HOSPADM

## 2023-09-14 RX ADMIN — LIDOCAINE HYDROCHLORIDE 10 ML: 10 INJECTION, SOLUTION EPIDURAL; INFILTRATION; INTRACAUDAL; PERINEURAL at 14:28

## 2023-09-14 RX ADMIN — ALBUMIN (HUMAN) 37.5 G: 0.25 INJECTION, SOLUTION INTRAVENOUS at 15:19

## 2023-09-14 ASSESSMENT — PAIN - FUNCTIONAL ASSESSMENT: PAIN_FUNCTIONAL_ASSESSMENT: NONE - DENIES PAIN

## 2023-09-14 NOTE — PROGRESS NOTES
TRANSFER - OUT REPORT:    Verbal report given to [place] on John Hall being transferred to cath lab holding for routine post-op       Report consisted of patient's Situation, Background, Assessment and   Recommendations(SBAR). Information from the following report(s) Nurse Handoff Report was reviewed with the receiving nurse. Opportunity for questions and clarification was provided.       Patient transported with:   Registered Nurse

## 2023-09-17 LAB
CULTURE: NORMAL
Lab: NORMAL
SPECIMEN: NORMAL

## 2023-09-18 NOTE — PROGRESS NOTES
IR Procedure at [facility]:  Left message for patient to arrive at 1000 at [facility] on 9/21/2023 for his procedure at 1130. Patient has been given below instructions. (Paracentesis, Thoracentesis, Thyroid Bx and Injections may have a light breakfast)  2. Follow your directions as prescribed by the doctor for your procedure and medications. 3.   Consult your provider as to when to stop blood thinner  4. Do not take any pain medication within 6 hours of your procedure  5. Do not drink any alcoholic beverages or use any street drugs 24 hours before procedure. 6.   Please wear simple, loose fitting clothing to the hospital.  Do not bring valuables (money,             credit cards, checkbooks, etc.)     7. If you  have a Living Will and Durable Power of  for Healthcare, please bring in a copy. 8.   Please bring picture ID,  insurance card, paperwork from the doctors office            (H & P, Consent,  & card for implantable devices). 9.   Report to the information desk on the ground floor. 10. Take a shower the night before or morning of your procedure, do not apply any lotion, oil or powder. 11. If you are going to be sedated for the procedure, you will need a responsible adult to drive you home.

## 2023-09-21 ENCOUNTER — HOSPITAL ENCOUNTER (OUTPATIENT)
Dept: INTERVENTIONAL RADIOLOGY/VASCULAR | Age: 78
Discharge: HOME | End: 2023-09-21
Payer: COMMERCIAL

## 2023-09-21 VITALS
HEART RATE: 68 BPM | TEMPERATURE: 98.1 F | DIASTOLIC BLOOD PRESSURE: 62 MMHG | OXYGEN SATURATION: 98 % | SYSTOLIC BLOOD PRESSURE: 110 MMHG

## 2023-09-21 DIAGNOSIS — R18.8 OTHER ASCITES: ICD-10-CM

## 2023-09-21 LAB
FLUID TYPE: NORMAL INDEX
LYMPHOCYTES, BODY FLUID: 32 %
MESOTHELIAL FLUID: 2 /100 WBC
MONOCYTE, FLUID: 58 %
NEUTROPHIL, FLUID: 8 %
OTHER CELLS FLUID: NORMAL
RBC FLUID: 409 /CU MM
WBC FLUID: 106 /CU MM

## 2023-09-21 PROCEDURE — 87070 CULTURE OTHR SPECIMN AEROBIC: CPT

## 2023-09-21 PROCEDURE — 2580000003 HC RX 258

## 2023-09-21 PROCEDURE — 89051 BODY FLUID CELL COUNT: CPT

## 2023-09-21 PROCEDURE — 2709999900 IR US GUIDED PARACENTESIS

## 2023-09-21 PROCEDURE — 2500000003 HC RX 250 WO HCPCS: Performed by: RADIOLOGY

## 2023-09-21 PROCEDURE — P9047 ALBUMIN (HUMAN), 25%, 50ML: HCPCS

## 2023-09-21 PROCEDURE — 87205 SMEAR GRAM STAIN: CPT

## 2023-09-21 PROCEDURE — 49083 ABD PARACENTESIS W/IMAGING: CPT

## 2023-09-21 PROCEDURE — 6360000002 HC RX W HCPCS

## 2023-09-21 RX ORDER — LIDOCAINE HYDROCHLORIDE 10 MG/ML
INJECTION, SOLUTION EPIDURAL; INFILTRATION; INTRACAUDAL; PERINEURAL PRN
Status: COMPLETED | OUTPATIENT
Start: 2023-09-21 | End: 2023-09-21

## 2023-09-21 RX ORDER — SODIUM CHLORIDE 0.9 % (FLUSH) 0.9 %
10 SYRINGE (ML) INJECTION PRN
Status: DISCONTINUED | OUTPATIENT
Start: 2023-09-21 | End: 2023-09-22 | Stop reason: HOSPADM

## 2023-09-21 RX ADMIN — LIDOCAINE HYDROCHLORIDE 7 ML: 10 INJECTION, SOLUTION EPIDURAL; INFILTRATION; INTRACAUDAL; PERINEURAL at 12:20

## 2023-09-21 NOTE — PROGRESS NOTES
TRANSFER - OUT REPORT:    Verbal report given to Alex Sanford on Wyman Simmonds being transferred to Cath lab holding for routine post-op       Report consisted of patient's Situation, Background, Assessment and   Recommendations(SBAR). 7720ml milky yellow fluid removed during paracentesis. Information from the following report(s) Nurse Handoff Report was reviewed with the receiving nurse. Opportunity for questions and clarification was provided.       Patient transported with:   Registered Nurse

## 2023-09-21 NOTE — H&P
mouth once daily 90 tablet 1    rifAXIMin (XIFAXAN) 550 MG tablet Take 1 tablet by mouth 2 times daily 60 tablet 5    albuterol sulfate HFA (PROVENTIL;VENTOLIN;PROAIR) 108 (90 Base) MCG/ACT inhaler INHALE 2 PUFFS BY MOUTH 4 TIMES DAILY AS NEEDED FOR WHEEZING 1 each 2    lactulose (CHRONULAC) 10 GM/15ML solution Take 30 mLs by mouth 3 times daily Hold if having diarrhea 2700 mL 5    betamethasone valerate (VALISONE) 0.1 % lotion Apply topically 2 times daily prn scalp rash 60 mL 3    nadolol (CORGARD) 40 MG tablet Take 1 tablet by mouth in the morning. (Patient taking differently: Take 1 tablet by mouth nightly) 90 tablet 3    Multiple Vitamin (MULTI VITAMIN MENS PO) Take 1 tablet by mouth daily      ESOMEPRAZOLE MAGNESIUM PO Take 20 mg by mouth daily OTC      Omega-3 1000 MG CAPS Take 2 capsules by mouth daily       No current facility-administered medications on file prior to encounter. ROS: No fevers or chills    Vital Signs: There is no height or weight on file to calculate BMI. Laboratory:  No results for input(s): \"WBC\", \"HEMOGLOBIN\", \"NA\", \"POTASSIUM\", \"CL\", \"CO2\", \"BUN\", \"CREATININE\", \"GLUCOSE\", \"CA\", \"INR\", \"PTT\", \"CKMB\" in the last 72 hours. Invalid input(s): \"HEMATOCRIT\", \"PLATELETS\", \"PT\", \"CK1\", \"TROP\"  INR @LABR24(INR)@    Physical Exam:  GENERAL:Well developed, well nourished in NAD  NECK: Neck exam - No JVD,HJR or carotid bruit, no thyromegaly   RESPIRATORY:Clear to auscultation  HEART:RRR,no murmer, gallop or friction rub  ABDOMEN: Bowel sounds present, no tenderness to palpation. No organomegaly. Moderate abdominal distention  EXTREMITIES: no edema or cyanosis  VASCULAR:  no ischemic changes  SKIN: no erythema, rubor or lesions noted  NEURO/PSYCH:Alert and oriented    EKG:    Imaging:    Chest: CTA    Heart: S1, S1    Impression:  Active Problems:    * No active hospital problems. *  Resolved Problems:    * No resolved hospital problems.  *       Ascites    Mallampati Score 2  ASA

## 2023-09-21 NOTE — DISCHARGE SUMMARY
Discharge education complete with patient, pt is alert and oriented x 4, denies pain, VSS, no bleeding at puncture site, no further questions.

## 2023-09-21 NOTE — PROGRESS NOTES
IV became infiltrated during procedure. Dr. Titus Allred contacted and was ordered to stop procedure if could not get access for albumin administration. Procedure stopped d/t no IV access.

## 2023-09-24 LAB
CULTURE: NORMAL
Lab: NORMAL
SPECIMEN: NORMAL

## 2023-09-25 NOTE — PROGRESS NOTES
IR Procedure at [facility]:  Left message for patient to arrive at 0900 at [facility] on 9/28/2023 for his procedure at 1030. Patient has been given below instructions. (Paracentesis, Thoracentesis, Thyroid Bx and Injections may have a light breakfast)  2. Follow your directions as prescribed by the doctor for your procedure and medications. 3.   Consult your provider as to when to stop blood thinner  4. Do not take any pain medication within 6 hours of your procedure  5. Do not drink any alcoholic beverages or use any street drugs 24 hours before procedure. 6.   Please wear simple, loose fitting clothing to the hospital.  Do not bring valuables (money,             credit cards, checkbooks, etc.)     7. If you  have a Living Will and Durable Power of  for Healthcare, please bring in a copy. 8.   Please bring picture ID,  insurance card, paperwork from the doctors office            (H & P, Consent,  & card for implantable devices). 9.   Report to the information desk on the ground floor. 10. Take a shower the night before or morning of your procedure, do not apply any lotion, oil or powder. 11. If you are going to be sedated for the procedure, you will need a responsible adult to drive you home.

## 2023-09-26 LAB
CULTURE: ABNORMAL
GRAM SMEAR: ABNORMAL
Lab: ABNORMAL
SPECIMEN: ABNORMAL

## 2023-09-28 ENCOUNTER — HOSPITAL ENCOUNTER (OUTPATIENT)
Dept: INTERVENTIONAL RADIOLOGY/VASCULAR | Age: 78
Discharge: HOME | End: 2023-09-28
Payer: COMMERCIAL

## 2023-09-28 VITALS
TEMPERATURE: 98.4 F | DIASTOLIC BLOOD PRESSURE: 64 MMHG | OXYGEN SATURATION: 100 % | HEIGHT: 68 IN | BODY MASS INDEX: 34.86 KG/M2 | RESPIRATION RATE: 16 BRPM | WEIGHT: 230 LBS | SYSTOLIC BLOOD PRESSURE: 117 MMHG | HEART RATE: 64 BPM

## 2023-09-28 DIAGNOSIS — K70.31 ASCITES DUE TO ALCOHOLIC CIRRHOSIS (HCC): ICD-10-CM

## 2023-09-28 LAB
APTT: 27 SECONDS (ref 25.1–37.1)
FLUID TYPE: NORMAL INDEX
HCT VFR BLD CALC: 35.8 % (ref 42–52)
HEMOGLOBIN: 11.3 GM/DL (ref 13.5–18)
INR BLD: 1.1 INDEX
LYMPHOCYTES, BODY FLUID: 27 %
MCH RBC QN AUTO: 30.5 PG (ref 27–31)
MCHC RBC AUTO-ENTMCNC: 31.6 % (ref 32–36)
MCV RBC AUTO: 96.8 FL (ref 78–100)
MONOCYTE, FLUID: 56 %
NEUTROPHIL, FLUID: 17 %
PDW BLD-RTO: 15.6 % (ref 11.7–14.9)
PLATELET # BLD: 246 K/CU MM (ref 140–440)
PMV BLD AUTO: 9.9 FL (ref 7.5–11.1)
PROTHROMBIN TIME: 14 SECONDS (ref 11.7–14.5)
RBC # BLD: 3.7 M/CU MM (ref 4.6–6.2)
RBC FLUID: 78 /CU MM
WBC # BLD: 7.9 K/CU MM (ref 4–10.5)
WBC FLUID: 121 /CU MM

## 2023-09-28 PROCEDURE — 85610 PROTHROMBIN TIME: CPT

## 2023-09-28 PROCEDURE — 2500000003 HC RX 250 WO HCPCS: Performed by: RADIOLOGY

## 2023-09-28 PROCEDURE — P9047 ALBUMIN (HUMAN), 25%, 50ML: HCPCS

## 2023-09-28 PROCEDURE — P9047 ALBUMIN (HUMAN), 25%, 50ML: HCPCS | Performed by: SPECIALIST

## 2023-09-28 PROCEDURE — 6360000002 HC RX W HCPCS: Performed by: SPECIALIST

## 2023-09-28 PROCEDURE — 89051 BODY FLUID CELL COUNT: CPT

## 2023-09-28 PROCEDURE — 83615 LACTATE (LD) (LDH) ENZYME: CPT

## 2023-09-28 PROCEDURE — 6360000002 HC RX W HCPCS

## 2023-09-28 PROCEDURE — 49083 ABD PARACENTESIS W/IMAGING: CPT

## 2023-09-28 PROCEDURE — 2709999900 IR US GUIDED PARACENTESIS

## 2023-09-28 PROCEDURE — 87070 CULTURE OTHR SPECIMN AEROBIC: CPT

## 2023-09-28 PROCEDURE — 88108 CYTOPATH CONCENTRATE TECH: CPT

## 2023-09-28 PROCEDURE — 87205 SMEAR GRAM STAIN: CPT

## 2023-09-28 PROCEDURE — 85027 COMPLETE CBC AUTOMATED: CPT

## 2023-09-28 PROCEDURE — 85730 THROMBOPLASTIN TIME PARTIAL: CPT

## 2023-09-28 PROCEDURE — 2580000003 HC RX 258

## 2023-09-28 PROCEDURE — 88305 TISSUE EXAM BY PATHOLOGIST: CPT

## 2023-09-28 PROCEDURE — 82945 GLUCOSE OTHER FLUID: CPT

## 2023-09-28 PROCEDURE — 84157 ASSAY OF PROTEIN OTHER: CPT

## 2023-09-28 RX ORDER — SODIUM CHLORIDE 0.9 % (FLUSH) 0.9 %
10 SYRINGE (ML) INJECTION PRN
Status: DISCONTINUED | OUTPATIENT
Start: 2023-09-28 | End: 2023-09-29 | Stop reason: HOSPADM

## 2023-09-28 RX ORDER — ALBUMIN (HUMAN) 12.5 G/50ML
SOLUTION INTRAVENOUS CONTINUOUS PRN
Status: COMPLETED | OUTPATIENT
Start: 2023-09-28 | End: 2023-09-28

## 2023-09-28 RX ORDER — LIDOCAINE HYDROCHLORIDE 10 MG/ML
INJECTION, SOLUTION EPIDURAL; INFILTRATION; INTRACAUDAL; PERINEURAL PRN
Status: COMPLETED | OUTPATIENT
Start: 2023-09-28 | End: 2023-09-28

## 2023-09-28 RX ADMIN — LIDOCAINE HYDROCHLORIDE 10 ML: 10 INJECTION, SOLUTION EPIDURAL; INFILTRATION; INTRACAUDAL; PERINEURAL at 10:37

## 2023-09-28 RX ADMIN — ALBUMIN (HUMAN) 50 G: 0.25 INJECTION, SOLUTION INTRAVENOUS at 11:14

## 2023-10-01 LAB
CULTURE: NORMAL
Lab: NORMAL
SPECIMEN: NORMAL

## 2023-10-02 NOTE — PROGRESS NOTES
IR Procedure at [facility]:  Left message on patient's wife Gaby's phone for patient to arrive at 0900 at [facility] on 10/5/2023 for his procedure at 1030. Patient has been given below instructions. (Paracentesis, Thoracentesis, Thyroid Bx and Injections may have a light breakfast)  2. Follow your directions as prescribed by the doctor for your procedure and medications. 3.   Consult your provider as to when to stop blood thinner  4. Do not take any pain medication within 6 hours of your procedure  5. Do not drink any alcoholic beverages or use any street drugs 24 hours before procedure. 6.   Please wear simple, loose fitting clothing to the hospital.  Do not bring valuables (money,             credit cards, checkbooks, etc.)     7. If you  have a Living Will and Durable Power of  for Healthcare, please bring in a copy. 8.   Please bring picture ID,  insurance card, paperwork from the doctors office            (H & P, Consent,  & card for implantable devices). 9.   Report to the information desk on the ground floor. 10. Take a shower the night before or morning of your procedure, do not apply any lotion, oil or powder. 11. If you are going to be sedated for the procedure, you will need a responsible adult to drive you home.

## 2023-10-05 ENCOUNTER — HOSPITAL ENCOUNTER (OUTPATIENT)
Dept: INTERVENTIONAL RADIOLOGY/VASCULAR | Age: 78
Discharge: HOME | End: 2023-10-05
Payer: COMMERCIAL

## 2023-10-05 VITALS — OXYGEN SATURATION: 98 % | DIASTOLIC BLOOD PRESSURE: 60 MMHG | SYSTOLIC BLOOD PRESSURE: 109 MMHG | HEART RATE: 67 BPM

## 2023-10-05 DIAGNOSIS — R18.8 OTHER ASCITES: ICD-10-CM

## 2023-10-05 LAB
FLUID TYPE: NORMAL INDEX
LYMPHOCYTES, BODY FLUID: 61 %
MESOTHELIAL FLUID: 2 /100 WBC
MONOCYTE, FLUID: 35 %
NEUTROPHIL, FLUID: 4 %
OTHER CELLS FLUID: 0
RBC FLUID: 145 /CU MM
WBC FLUID: 118 /CU MM

## 2023-10-05 PROCEDURE — 87070 CULTURE OTHR SPECIMN AEROBIC: CPT

## 2023-10-05 PROCEDURE — 87205 SMEAR GRAM STAIN: CPT

## 2023-10-05 PROCEDURE — 6360000002 HC RX W HCPCS

## 2023-10-05 PROCEDURE — 2500000003 HC RX 250 WO HCPCS: Performed by: PEDIATRICS

## 2023-10-05 PROCEDURE — P9047 ALBUMIN (HUMAN), 25%, 50ML: HCPCS | Performed by: SPECIALIST

## 2023-10-05 PROCEDURE — 2709999900 IR US GUIDED PARACENTESIS

## 2023-10-05 PROCEDURE — 89051 BODY FLUID CELL COUNT: CPT

## 2023-10-05 PROCEDURE — P9047 ALBUMIN (HUMAN), 25%, 50ML: HCPCS

## 2023-10-05 PROCEDURE — 6360000002 HC RX W HCPCS: Performed by: SPECIALIST

## 2023-10-05 PROCEDURE — 49083 ABD PARACENTESIS W/IMAGING: CPT

## 2023-10-05 RX ORDER — LIDOCAINE HYDROCHLORIDE 10 MG/ML
INJECTION, SOLUTION EPIDURAL; INFILTRATION; INTRACAUDAL; PERINEURAL PRN
Status: COMPLETED | OUTPATIENT
Start: 2023-10-05 | End: 2023-10-05

## 2023-10-05 RX ORDER — ALBUMIN (HUMAN) 12.5 G/50ML
SOLUTION INTRAVENOUS CONTINUOUS PRN
Status: COMPLETED | OUTPATIENT
Start: 2023-10-05 | End: 2023-10-05

## 2023-10-05 RX ADMIN — ALBUMIN (HUMAN) 75 G: 0.25 INJECTION, SOLUTION INTRAVENOUS at 10:21

## 2023-10-05 RX ADMIN — LIDOCAINE HYDROCHLORIDE 5 ML: 10 INJECTION, SOLUTION EPIDURAL; INFILTRATION; INTRACAUDAL; PERINEURAL at 09:36

## 2023-10-05 NOTE — BRIEF OP NOTE
Brief Postoperative Note      Patient: Dayana Pineda  YOB: 1945  MRN: [id number]    Date of Procedure: 10/5/2023    Ascites. Post-Op Diagnosis: Same       Paracentesis. Assistant:  * No surgical staff found *    Anesthesia: local only. Estimated Blood Loss (mL): Minimal    Complications: None    Specimens:   Sent ascites fluid for labs. Implants:  * No implants in log *      Drains: no drains. Findings: large volume ascites, aspirated about 10 L of cloudy thin fluid, sent for labs. Pt received 75 g albumin.        Electronically signed by Sarthak Kellogg MD on 10/5/2023 at 10:30 AM

## 2023-10-05 NOTE — DISCHARGE INSTRUCTIONS
Resume taking your previously prescribed medications per your doctors orders. Follow up with your ordering doctor.

## 2023-10-05 NOTE — PROGRESS NOTES
Pt arrived back to [facility] from his procedure. He is alert and oriented x4, VSS, and denies any needs.

## 2023-10-05 NOTE — PROGRESS NOTES
TRANSFER - OUT REPORT:    Pt tolerated procedure well, 10,100 cc ascitic fluid removed. Band aid in place clean dry and intact. Fluid replaced with albumin per order, fluids sent to lab. Verbal report given to [facility] RN(name) on Peña Foil being transferred to post op (unit) for routine post-op       Report consisted of patient's Situation, Background, Assessment and   Recommendations(SBAR). Information from the following report(s) Nurse Handoff Report was reviewed with the receiving nurse. Opportunity for questions and clarification was provided.       Patient transported with:   Registered Nurse

## 2023-10-06 LAB
CULTURE: ABNORMAL
GRAM SMEAR: ABNORMAL
Lab: ABNORMAL
SPECIMEN: ABNORMAL

## 2023-10-08 LAB
CULTURE: NORMAL
Lab: NORMAL
SPECIMEN: NORMAL

## 2023-10-09 LAB
CULTURE: ABNORMAL
GRAM SMEAR: ABNORMAL
Lab: ABNORMAL
SPECIMEN: ABNORMAL

## 2023-10-10 DIAGNOSIS — F41.9 ANXIETY: ICD-10-CM

## 2023-10-10 RX ORDER — ALPRAZOLAM 0.5 MG/1
0.5 TABLET ORAL 2 TIMES DAILY PRN
Qty: 30 TABLET | Refills: 0 | Status: SHIPPED | OUTPATIENT
Start: 2023-10-10 | End: 2023-11-09

## 2023-10-10 NOTE — TELEPHONE ENCOUNTER
----- Message from Li Brittanie sent at 10/9/2023  3:53 PM EDT -----  Subject: Refill Request    QUESTIONS  Name of Medication? ALPRAZolam (XANAX) 0.5 MG tablet  Patient-reported dosage and instructions? Take 1 tablet by mouth as needed   for dizziness for up to 30 days. How many days do you have left? 0  Preferred Pharmacy? [facility]  Pharmacy phone number (if available)? [phone]  Additional Information for Provider? Patient states he needs this medicine   for dizziness. He could not hear on the phone so wife took over the call. States PCP is aware of the dizziness and this is why he takes this   medicine, not sleep.   ---------------------------------------------------------------------------  --------------  CALL BACK INFO  What is the best way for the office to contact you? OK to leave message on   voicemail  Preferred Call Back Phone Number? [phone]  ---------------------------------------------------------------------------  --------------  SCRIPT ANSWERS  Relationship to Patient? Spouse/Partner  Representative Name? Romeo Walker  Is the representative on the Communication Release of Information (MIKEY)   form in Epic?  Yes

## 2023-10-11 NOTE — PROGRESS NOTES
IR Procedure at [facility]:  Left message for patient to arrive at 0900 at [facility] on 10/12/2023 for his procedure at 1030. Patient has been given below instructions. (Paracentesis, Thoracentesis, Thyroid Bx and Injections may have a light breakfast)  2. Follow your directions as prescribed by the doctor for your procedure and medications. 3.   Consult your provider as to when to stop blood thinner  4. Do not take any pain medication within 6 hours of your procedure  5. Do not drink any alcoholic beverages or use any street drugs 24 hours before procedure. 6.   Please wear simple, loose fitting clothing to the hospital.  Do not bring valuables (money,             credit cards, checkbooks, etc.)     7. If you  have a Living Will and Durable Power of  for Healthcare, please bring in a copy. 8.   Please bring picture ID,  insurance card, paperwork from the doctors office            (H & P, Consent,  & card for implantable devices). 9.   Report to the information desk on the ground floor. 10. Take a shower the night before or morning of your procedure, do not apply any lotion, oil or powder. 11. If you are going to be sedated for the procedure, you will need a responsible adult to drive you home.

## 2023-10-12 ENCOUNTER — HOSPITAL ENCOUNTER (OUTPATIENT)
Dept: INTERVENTIONAL RADIOLOGY/VASCULAR | Age: 78
Discharge: HOME | End: 2023-10-12
Payer: COMMERCIAL

## 2023-10-12 VITALS
TEMPERATURE: 98 F | WEIGHT: 230 LBS | OXYGEN SATURATION: 100 % | RESPIRATION RATE: 16 BRPM | DIASTOLIC BLOOD PRESSURE: 76 MMHG | HEART RATE: 72 BPM | SYSTOLIC BLOOD PRESSURE: 109 MMHG | HEIGHT: 68 IN | BODY MASS INDEX: 34.86 KG/M2

## 2023-10-12 DIAGNOSIS — R18.8 OTHER ASCITES: ICD-10-CM

## 2023-10-12 LAB
FLUID TYPE: NORMAL INDEX
LYMPHOCYTES, BODY FLUID: 24 %
MESOTHELIAL FLUID: 0 /100 WBC
MONOCYTE, FLUID: 58 %
NEUTROPHIL, FLUID: 18 %
OTHER CELLS FLUID: 0
RBC FLUID: 170 /CU MM
WBC FLUID: 131 /CU MM

## 2023-10-12 PROCEDURE — 6360000002 HC RX W HCPCS

## 2023-10-12 PROCEDURE — P9047 ALBUMIN (HUMAN), 25%, 50ML: HCPCS

## 2023-10-12 PROCEDURE — 2709999900 IR US GUIDED PARACENTESIS

## 2023-10-12 PROCEDURE — 87070 CULTURE OTHR SPECIMN AEROBIC: CPT

## 2023-10-12 PROCEDURE — 89051 BODY FLUID CELL COUNT: CPT

## 2023-10-12 PROCEDURE — P9047 ALBUMIN (HUMAN), 25%, 50ML: HCPCS | Performed by: SPECIALIST

## 2023-10-12 PROCEDURE — 49083 ABD PARACENTESIS W/IMAGING: CPT

## 2023-10-12 PROCEDURE — 2500000003 HC RX 250 WO HCPCS: Performed by: RADIOLOGY

## 2023-10-12 PROCEDURE — 87205 SMEAR GRAM STAIN: CPT

## 2023-10-12 PROCEDURE — 2580000003 HC RX 258

## 2023-10-12 PROCEDURE — 6360000002 HC RX W HCPCS: Performed by: SPECIALIST

## 2023-10-12 RX ORDER — SODIUM CHLORIDE 0.9 % (FLUSH) 0.9 %
10 SYRINGE (ML) INJECTION PRN
Status: DISCONTINUED | OUTPATIENT
Start: 2023-10-12 | End: 2023-10-13 | Stop reason: HOSPADM

## 2023-10-12 RX ORDER — ALBUMIN (HUMAN) 12.5 G/50ML
SOLUTION INTRAVENOUS CONTINUOUS PRN
Status: COMPLETED | OUTPATIENT
Start: 2023-10-12 | End: 2023-10-12

## 2023-10-12 RX ORDER — LIDOCAINE HYDROCHLORIDE 10 MG/ML
INJECTION, SOLUTION EPIDURAL; INFILTRATION; INTRACAUDAL; PERINEURAL PRN
Status: COMPLETED | OUTPATIENT
Start: 2023-10-12 | End: 2023-10-12

## 2023-10-12 RX ADMIN — LIDOCAINE HYDROCHLORIDE 8 ML: 10 INJECTION, SOLUTION EPIDURAL; INFILTRATION; INTRACAUDAL; PERINEURAL at 10:19

## 2023-10-12 RX ADMIN — ALBUMIN (HUMAN) 50 G: 0.25 INJECTION, SOLUTION INTRAVENOUS at 11:03

## 2023-10-12 NOTE — PROGRESS NOTES
Patient discharged post procedure. Vitals stable. Dressing clean dry intact. Discharge reviewed states no questions.

## 2023-10-12 NOTE — PROGRESS NOTES
TRANSFER - OUT REPORT:    Verbal report given to Siddharth Crespo on Benjamin Buasaf being transferred to cath lab holding for routine post-op       Report consisted of patient's Situation, Background, Assessment and   Recommendations(SBAR). 9470cc milky yellow ascitic fluid removed during paracentesis. Information from the following report(s) Nurse Handoff Report was reviewed with the receiving nurse. Opportunity for questions and clarification was provided.       Patient transported with:   Registered Nurse

## 2023-10-13 NOTE — H&P
*  Resolved Problems:    * No resolved hospital problems.  *        Ascites    Mallampati Score 2  ASA class 2    PLAN OF CARE/PLANNED PROCEDURE    IR US GUIDED PARACENTESIS W IMAGING [[id number]]

## 2023-10-15 LAB
CULTURE: NORMAL
Lab: NORMAL
SPECIMEN: NORMAL

## 2023-10-16 NOTE — PROGRESS NOTES
IR Procedure at [facility]: Left message on patient's wife Gaby's phone for patient to arrive at 21 [phone] at [facility] on 10/19/2023 for his procedure at 1200. Patient has been given below instructions. (Paracentesis, Thoracentesis, Thyroid Bx and Injections may have a light breakfast)  2. Follow your directions as prescribed by the doctor for your procedure and medications. 3.   Consult your provider as to when to stop blood thinner  4. Do not take any pain medication within 6 hours of your procedure  5. Do not drink any alcoholic beverages or use any street drugs 24 hours before procedure. 6.   Please wear simple, loose fitting clothing to the hospital.  Do not bring valuables (money,             credit cards, checkbooks, etc.)     7. If you  have a Living Will and Durable Power of  for Healthcare, please bring in a copy. 8.   Please bring picture ID,  insurance card, paperwork from the doctors office            (H & P, Consent,  & card for implantable devices). 9.   Report to the information desk on the ground floor. 10. Take a shower the night before or morning of your procedure, do not apply any lotion, oil or powder. 11. If you are going to be sedated for the procedure, you will need a responsible adult to drive you home.

## 2023-10-18 ENCOUNTER — APPOINTMENT (OUTPATIENT)
Dept: ULTRASOUND IMAGING | Age: 78
End: 2023-10-18
Payer: COMMERCIAL

## 2023-10-18 ENCOUNTER — APPOINTMENT (OUTPATIENT)
Dept: CT IMAGING | Age: 78
End: 2023-10-18
Payer: COMMERCIAL

## 2023-10-18 ENCOUNTER — HOSPITAL ENCOUNTER (OUTPATIENT)
Age: 78
Setting detail: OBSERVATION
Discharge: HOME | End: 2023-10-19
Attending: STUDENT IN AN ORGANIZED HEALTH CARE EDUCATION/TRAINING PROGRAM | Admitting: STUDENT IN AN ORGANIZED HEALTH CARE EDUCATION/TRAINING PROGRAM
Payer: COMMERCIAL

## 2023-10-18 DIAGNOSIS — S09.90XA INJURY OF HEAD, INITIAL ENCOUNTER: Primary | ICD-10-CM
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
## 2023-10-18 DIAGNOSIS — R41.82 ALTERED MENTAL STATUS, UNSPECIFIED ALTERED MENTAL STATUS TYPE: ICD-10-CM

## 2023-10-18 LAB
ALBUMIN SERPL-MCNC: 3.6 GM/DL (ref 3.4–5)
ALCOHOL SCREEN SERUM: <0.01 %WT/VOL
ALP BLD-CCNC: 130 IU/L (ref 40–129)
ALT SERPL-CCNC: 13 U/L (ref 10–40)
AMMONIA: 153 UMOL/L (ref 16–60)
ANION GAP SERPL CALCULATED.3IONS-SCNC: 10 MMOL/L (ref 4–16)
AST SERPL-CCNC: 33 IU/L (ref 15–37)
BASE EXCESS MIXED: 0.6 (ref 0–1.2)
BASOPHILS ABSOLUTE: 0.1 K/CU MM
BASOPHILS RELATIVE PERCENT: 1.4 % (ref 0–1)
BILIRUB SERPL-MCNC: 0.8 MG/DL (ref 0–1)
BUN SERPL-MCNC: 30 MG/DL (ref 6–23)
CALCIUM SERPL-MCNC: 9.9 MG/DL (ref 8.3–10.6)
CHLORIDE BLD-SCNC: 98 MMOL/L (ref 99–110)
CO2: 27 MMOL/L (ref 21–32)
COMMENT: ABNORMAL
CREAT SERPL-MCNC: 1.9 MG/DL (ref 0.9–1.3)
CULTURE: ABNORMAL
DIFFERENTIAL TYPE: ABNORMAL
EKG ATRIAL RATE: 74 BPM
EKG DIAGNOSIS: NORMAL
EKG P AXIS: 25 DEGREES
EKG P-R INTERVAL: 166 MS
EKG Q-T INTERVAL: 414 MS
EKG QRS DURATION: 72 MS
EKG QTC CALCULATION (BAZETT): 459 MS
EKG R AXIS: 36 DEGREES
EKG T AXIS: 28 DEGREES
EKG VENTRICULAR RATE: 74 BPM
EOSINOPHILS ABSOLUTE: 0.5 K/CU MM
EOSINOPHILS RELATIVE PERCENT: 6.3 % (ref 0–3)
GFR SERPL CREATININE-BSD FRML MDRD: 36 ML/MIN/1.73M2
GLUCOSE SERPL-MCNC: 116 MG/DL (ref 70–99)
GRAM SMEAR: ABNORMAL
HCO3 VENOUS: 26.6 MMOL/L (ref 19–25)
HCT VFR BLD CALC: 35.9 % (ref 42–52)
HEMOGLOBIN: 11.2 GM/DL (ref 13.5–18)
IMMATURE NEUTROPHIL %: 0.3 % (ref 0–0.43)
INR BLD: 1.1 INDEX
LYMPHOCYTES ABSOLUTE: 0.4 K/CU MM
LYMPHOCYTES RELATIVE PERCENT: 5 % (ref 24–44)
Lab: ABNORMAL
MCH RBC QN AUTO: 30.2 PG (ref 27–31)
MCHC RBC AUTO-ENTMCNC: 31.2 % (ref 32–36)
MCV RBC AUTO: 96.8 FL (ref 78–100)
MONOCYTES ABSOLUTE: 0.8 K/CU MM
MONOCYTES RELATIVE PERCENT: 10.5 % (ref 0–4)
NUCLEATED RBC %: 0 %
O2 SAT, VEN: 66.8 % (ref 50–70)
PCO2, VEN: 47 MMHG (ref 38–52)
PDW BLD-RTO: 15.4 % (ref 11.7–14.9)
PH VENOUS: 7.36 (ref 7.32–7.42)
PLATELET # BLD: 255 K/CU MM (ref 140–440)
PMV BLD AUTO: 10.3 FL (ref 7.5–11.1)
PO2, VEN: 42 MMHG (ref 28–48)
POTASSIUM SERPL-SCNC: 4.3 MMOL/L (ref 3.5–5.1)
PROTHROMBIN TIME: 14.6 SECONDS (ref 11.7–14.5)
RBC # BLD: 3.71 M/CU MM (ref 4.6–6.2)
SEGMENTED NEUTROPHILS ABSOLUTE COUNT: 6 K/CU MM
SEGMENTED NEUTROPHILS RELATIVE PERCENT: 76.5 % (ref 36–66)
SODIUM BLD-SCNC: 135 MMOL/L (ref 135–145)
SPECIMEN: ABNORMAL
TOTAL IMMATURE NEUTOROPHIL: 0.02 K/CU MM
TOTAL NUCLEATED RBC: 0 K/CU MM
TOTAL PROTEIN: 8.2 GM/DL (ref 6.4–8.2)
WBC # BLD: 7.8 K/CU MM (ref 4–10.5)

## 2023-10-18 PROCEDURE — 85610 PROTHROMBIN TIME: CPT

## 2023-10-18 PROCEDURE — 80053 COMPREHEN METABOLIC PANEL: CPT

## 2023-10-18 PROCEDURE — 99285 EMERGENCY DEPT VISIT HI MDM: CPT

## 2023-10-18 PROCEDURE — 87040 BLOOD CULTURE FOR BACTERIA: CPT

## 2023-10-18 PROCEDURE — 76700 US EXAM ABDOM COMPLETE: CPT

## 2023-10-18 PROCEDURE — G0378 HOSPITAL OBSERVATION PER HR: HCPCS

## 2023-10-18 PROCEDURE — G0480 DRUG TEST DEF 1-7 CLASSES: HCPCS

## 2023-10-18 PROCEDURE — 82805 BLOOD GASES W/O2 SATURATION: CPT

## 2023-10-18 PROCEDURE — 70450 CT HEAD/BRAIN W/O DYE: CPT

## 2023-10-18 PROCEDURE — 82140 ASSAY OF AMMONIA: CPT

## 2023-10-18 PROCEDURE — 72125 CT NECK SPINE W/O DYE: CPT

## 2023-10-18 PROCEDURE — 84443 ASSAY THYROID STIM HORMONE: CPT

## 2023-10-18 PROCEDURE — 6360000002 HC RX W HCPCS: Performed by: STUDENT IN AN ORGANIZED HEALTH CARE EDUCATION/TRAINING PROGRAM

## 2023-10-18 PROCEDURE — 1200000000 HC SEMI PRIVATE

## 2023-10-18 PROCEDURE — 96372 THER/PROPH/DIAG INJ SC/IM: CPT

## 2023-10-18 PROCEDURE — 93005 ELECTROCARDIOGRAM TRACING: CPT | Performed by: STUDENT IN AN ORGANIZED HEALTH CARE EDUCATION/TRAINING PROGRAM

## 2023-10-18 PROCEDURE — 2580000003 HC RX 258: Performed by: STUDENT IN AN ORGANIZED HEALTH CARE EDUCATION/TRAINING PROGRAM

## 2023-10-18 PROCEDURE — 93010 ELECTROCARDIOGRAM REPORT: CPT | Performed by: INTERNAL MEDICINE

## 2023-10-18 PROCEDURE — 85025 COMPLETE CBC W/AUTO DIFF WBC: CPT

## 2023-10-18 PROCEDURE — 84439 ASSAY OF FREE THYROXINE: CPT

## 2023-10-18 PROCEDURE — 6370000000 HC RX 637 (ALT 250 FOR IP): Performed by: STUDENT IN AN ORGANIZED HEALTH CARE EDUCATION/TRAINING PROGRAM

## 2023-10-18 PROCEDURE — 84145 PROCALCITONIN (PCT): CPT

## 2023-10-18 RX ORDER — HEPARIN SODIUM 5000 [USP'U]/ML
5000 INJECTION, SOLUTION INTRAVENOUS; SUBCUTANEOUS EVERY 8 HOURS SCHEDULED
Status: DISCONTINUED | OUTPATIENT
Start: 2023-10-18 | End: 2023-10-19 | Stop reason: HOSPADM

## 2023-10-18 RX ORDER — LACTULOSE 10 G/15ML
20 SOLUTION ORAL 3 TIMES DAILY
Status: DISCONTINUED | OUTPATIENT
Start: 2023-10-18 | End: 2023-10-19 | Stop reason: HOSPADM

## 2023-10-18 RX ORDER — ENOXAPARIN SODIUM 100 MG/ML
40 INJECTION SUBCUTANEOUS DAILY
Status: DISCONTINUED | OUTPATIENT
Start: 2023-10-18 | End: 2023-10-18 | Stop reason: CLARIF

## 2023-10-18 RX ORDER — SODIUM CHLORIDE 0.9 % (FLUSH) 0.9 %
5-40 SYRINGE (ML) INJECTION PRN
Status: DISCONTINUED | OUTPATIENT
Start: 2023-10-18 | End: 2023-10-19 | Stop reason: HOSPADM

## 2023-10-18 RX ORDER — FUROSEMIDE 10 MG/ML
40 INJECTION INTRAMUSCULAR; INTRAVENOUS DAILY
Status: DISCONTINUED | OUTPATIENT
Start: 2023-10-18 | End: 2023-10-19 | Stop reason: HOSPADM

## 2023-10-18 RX ORDER — NADOLOL 20 MG/1
40 TABLET ORAL NIGHTLY
Status: DISCONTINUED | OUTPATIENT
Start: 2023-10-18 | End: 2023-10-19 | Stop reason: HOSPADM

## 2023-10-18 RX ORDER — LEVOTHYROXINE SODIUM 0.05 MG/1
50 TABLET ORAL DAILY
Status: DISCONTINUED | OUTPATIENT
Start: 2023-10-18 | End: 2023-10-19 | Stop reason: HOSPADM

## 2023-10-18 RX ORDER — SODIUM CHLORIDE 9 MG/ML
INJECTION, SOLUTION INTRAVENOUS PRN
Status: DISCONTINUED | OUTPATIENT
Start: 2023-10-18 | End: 2023-10-19 | Stop reason: HOSPADM

## 2023-10-18 RX ORDER — ONDANSETRON 4 MG/1
4 TABLET, ORALLY DISINTEGRATING ORAL EVERY 8 HOURS PRN
Status: DISCONTINUED | OUTPATIENT
Start: 2023-10-18 | End: 2023-10-19 | Stop reason: HOSPADM

## 2023-10-18 RX ORDER — ACETAMINOPHEN 500 MG
1000 TABLET ORAL
Status: DISPENSED | OUTPATIENT
Start: 2023-10-18 | End: 2023-10-19

## 2023-10-18 RX ORDER — SODIUM CHLORIDE 0.9 % (FLUSH) 0.9 %
5-40 SYRINGE (ML) INJECTION EVERY 12 HOURS SCHEDULED
Status: DISCONTINUED | OUTPATIENT
Start: 2023-10-18 | End: 2023-10-19 | Stop reason: HOSPADM

## 2023-10-18 RX ORDER — SPIRONOLACTONE 50 MG/1
100 TABLET, FILM COATED ORAL 2 TIMES DAILY
Status: DISCONTINUED | OUTPATIENT
Start: 2023-10-18 | End: 2023-10-19 | Stop reason: HOSPADM

## 2023-10-18 RX ORDER — ENOXAPARIN SODIUM 100 MG/ML
30 INJECTION SUBCUTANEOUS 2 TIMES DAILY
Status: DISCONTINUED | OUTPATIENT
Start: 2023-10-18 | End: 2023-10-18

## 2023-10-18 RX ORDER — ONDANSETRON 2 MG/ML
4 INJECTION INTRAMUSCULAR; INTRAVENOUS EVERY 6 HOURS PRN
Status: DISCONTINUED | OUTPATIENT
Start: 2023-10-18 | End: 2023-10-19 | Stop reason: HOSPADM

## 2023-10-18 RX ADMIN — SPIRONOLACTONE 100 MG: 50 TABLET ORAL at 22:47

## 2023-10-18 RX ADMIN — NADOLOL 40 MG: 20 TABLET ORAL at 23:04

## 2023-10-18 RX ADMIN — HEPARIN SODIUM 5000 UNITS: 5000 INJECTION INTRAVENOUS; SUBCUTANEOUS at 22:46

## 2023-10-18 RX ADMIN — LACTULOSE 20 G: 20 SOLUTION ORAL at 22:46

## 2023-10-18 RX ADMIN — RIFAXIMIN 550 MG: 550 TABLET ORAL at 22:47

## 2023-10-18 RX ADMIN — FUROSEMIDE 40 MG: 10 INJECTION, SOLUTION INTRAMUSCULAR; INTRAVENOUS at 22:47

## 2023-10-18 RX ADMIN — SODIUM CHLORIDE, PRESERVATIVE FREE 10 ML: 5 INJECTION INTRAVENOUS at 22:55

## 2023-10-18 NOTE — CARE COORDINATION
[facility] criteria for AMS reviewed at this time, criteria supports Observation Admission.  BAM,RN/CM

## 2023-10-18 NOTE — ED NOTES
Per pt wife, pt has been falling a lot more often at home, he has a walker but hasnt been using it. He has meniere's disease as well as cirrhosis from drinking alcohol. Pt has been having paracentesis weekly for the past 2-3 months. Pt is due tomorrow. He sees Dr. Govind Greene, does not take his lactulose like he should. Pt wife states pt has been sneaking alcohol at night time, gets argumentative about him medicine.       Chuyita Wahl RN  10/18/23 1742       Michell Jimenez  10/18/23 1742

## 2023-10-18 NOTE — H&P
V2.0  History and Physical      Name:  Tia Peabody /Age/Sex: 1945  (78 y.o. male)   MRN & CSN:  [id number] & [id number] Encounter Date/Time: 10/18/2023 5:46 PM EDT   Location:   PCP: Nusrat Dodson MD       Hospital Day: 1    Assessment and Plan:   Tia Peabody is a 78 y.o. male with a pmh of alcoholic cirrhosis, tricuspid valve endocarditis, CKD stage III, JENNIFER, HTN, hypothyroidism, GERD who presents with AMS (altered mental status)    Hospital Problems             Last Modified POA    * (Principal) AMS (altered mental status) 10/18/2023 Yes       Acute metabolic encephalopathy likely due to hepatic encephalopathy   Mechanical fall  -CT of the head notable for small cephalhematoma, conservative management  -Ammonia on presentation 153  -although unlikely, will get blood cultures and procal to r/o infectious etiology  -IR consulted for para (patient originally scheduled for tomorrow 10/19 as outpatient however required admission today)  -Follow-up fluid analysis from paracentesis to rule out SBP  -On lactulose and rifaximin with 2-3 BM/day  -will consult GI for further recs    Decompensated alcohol liver cirrhosis   Refractory Ascites  Multiple admissions for hepatic encephalopathy  -complicated by esophageal varices (never bled, on nadolol), 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 Fluid analysis not suggestive for SBP. Continue paracentesis every 2 weeks and thoracentesis prn  -Continue lactulose and rifaximin  -Pt not a candidate for TIPS due to PSE   - as per outpatient GI documentation, patient recommended repeat evaluation at [address] [place] [address]. Acute kidney injury on CKD stage III   -Dehydration versus hepatorenal etiology  -Creatinine on presentation 1.9  Baseline creatinine appears to be around 1.5-1.8.      Alcohol abuse  - patient admits to drinking alcohol, as recent as last month  - etoh level negative    Other chronic for review. Automated exposure control, iterative reconstruction, and/or weight based adjustment of the mA/kV was utilized to reduce the radiation dose to as low as reasonably achievable. COMPARISON: None. HISTORY: ORDERING SYSTEM PROVIDED HISTORY: Fall, amnestic to the event, AMS TECHNOLOGIST PROVIDED HISTORY: Reason for exam:->Fall, amnestic to the event, AMS Has a \"code stroke\" or \"stroke alert\" been called? ->No Decision Support Exception - unselect if not a suspected or confirmed emergency medical condition->Emergency Medical Condition (MA) Reason for Exam: Fall, amnestic to the event, AMS FINDINGS: CT SCAN HEAD: BRAIN/VENTRICLES: There is no acute intracranial hemorrhage, mass effect or midline shift. No abnormal extra-axial fluid collection. The gray-white differentiation is maintained without evidence of an acute infarct. There is no evidence of hydrocephalus. The cerebral sulci and ventricles are enlarged compatible with diffuse cerebral atrophy. There is low-attenuation in the periventricular white matter and deep white matter of the brain representing changes of chronic small vessel ischemic disease. ORBITS: The visualized portion of the orbits demonstrate no acute abnormality. SINUSES: The visualized paranasal sinuses and mastoid air cells demonstrate no acute abnormality. SOFT TISSUES/SKULL:  There is a small high right posterior cephalohematoma. CT SCAN CERVICAL SPINE: BONES/ALIGNMENT: The alignment of the cervical spine is within normal limits. No acute fractures or dislocations are seen. DEGENERATIVE CHANGES: There is mild degenerative disc disease of the cervical spine. SOFT TISSUES: There is no prevertebral soft tissue swelling. There is some fluid noted within the right lung apex     1. Small high right posterior cephalohematoma but no acute intracranial abnormality. 2. No acute traumatic abnormality involving the cervical spine.      IR US GUIDED PARACENTESIS    Result Date:

## 2023-10-18 NOTE — ED PROVIDER NOTES
Violence: Not on file   Housing Stability: Not on file     Current Facility-Administered Medications   Medication Dose Route Frequency Provider Last Rate Last Admin    acetaminophen (TYLENOL) tablet 1,000 mg  1,000 mg Oral NOW Latrice Wright MD         Current Outpatient Medications   Medication Sig Dispense Refill    ALPRAZolam (XANAX) 0.5 MG tablet Take 1 tablet by mouth 2 times daily as needed for Sleep for up to 30 days. 30 tablet 0    spironolactone (ALDACTONE) 100 MG tablet Take 1 tablet by mouth twice daily 60 tablet 0    furosemide (LASIX) 40 MG tablet Take 1 tablet by mouth daily 60 tablet 3    DULoxetine (CYMBALTA) 60 MG extended release capsule Take 1 capsule by mouth once daily 90 capsule 0    levothyroxine (SYNTHROID) 50 MCG tablet Take 1 tablet by mouth once daily 90 tablet 1    rifAXIMin (XIFAXAN) 550 MG tablet Take 1 tablet by mouth 2 times daily 60 tablet 5    albuterol sulfate HFA (PROVENTIL;VENTOLIN;PROAIR) 108 (90 Base) MCG/ACT inhaler INHALE 2 PUFFS BY MOUTH 4 TIMES DAILY AS NEEDED FOR WHEEZING 1 each 2    lactulose (CHRONULAC) 10 GM/15ML solution Take 30 mLs by mouth 3 times daily Hold if having diarrhea 2700 mL 5    betamethasone valerate (VALISONE) 0.1 % lotion Apply topically 2 times daily prn scalp rash 60 mL 3    nadolol (CORGARD) 40 MG tablet Take 1 tablet by mouth in the morning.  (Patient taking differently: Take 1 tablet by mouth nightly) 90 tablet 3    Multiple Vitamin (MULTI VITAMIN MENS PO) Take 1 tablet by mouth daily      ESOMEPRAZOLE MAGNESIUM PO Take 20 mg by mouth daily OTC      Omega-3 1000 MG CAPS Take 2 capsules by mouth daily       Allergies   Allergen Reactions    Lisinopril Swelling     Tongue swelling      Zetia [Ezetimibe] Swelling     Facial swelling    Atorvastatin     Codeine Other (See Comments)     Blood pressure drops    Hydrochlorothiazide     Hydroxyzine      Fatigue and depressed    Lexapro [Escitalopram Oxalate]      Increased depression    Pravastatin Patient given Tylenol for symptom control. Laboratory evaluation is remarkable for elevated ammonia of 253 which is worse than his usual.  Blood gas is unremarkable. CMP shows elevated creatinine and BUN which is usual for him. CBC is unremarkable. CT head and CT cervical spine are unremarkable for small cephalohematoma, but no acute intracranial abnormality. On reevaluation patient is still confused    History from : Patient and Family wife, nephew    Limitations to history : Altered Mental Status    Patient was given the following medications:  Medications   acetaminophen (TYLENOL) tablet 1,000 mg (has no administration in time range)       Independent Imaging Interpretation by me: CT head, CT cervical spine    EKG (if obtained): (All EKG's are interpreted by myself in the absence of a cardiologist)  12 lead EKG per my interpretation:  Normal Sinus Rhythm   Axis is   Normal  QTc is  within an acceptable range    There is no specific T wave changes appreciated. There is no specific ST wave changes appreciated. Prior EKG to compare with was available showing similar morphology        Chronic conditions affecting care: Liver cirrhosis which could be worsening his symptoms    Discussion with Other Profesionals : None    Social Determinants : None    Records Reviewed : Other    last discharge summary      Disposition Considerations (tests considered but not done, Shared Decision Making, Pt Expectation of Test or Tx.): Admitted for fall, altered mental status, likely secondary to worsening liver failure      I am the Primary Clinician of Record. Clinical Impression:  1. Injury of head, initial encounter    2. Fall, initial encounter    3. Altered mental status, unspecified altered mental status type      Disposition referral (if applicable):  No follow-up provider specified.   Disposition medications (if applicable):  New Prescriptions    No medications on file     ED Provider Disposition

## 2023-10-18 NOTE — ED TRIAGE NOTES
Pt arrived by EMS with complaints of a fall and head laceration to the back of hs head. Per pts son, pt was getting out of his truck and was off balance. Per pts son, there is a small lip from the driveway to the grass and he believes that he \"tripped getting out of the truck. \" Per son, pt was passed out for about 1 minute bleeding to the back of his head, but states pt does not remember the event. Per EMS, pt is not on blood thinners.

## 2023-10-19 ENCOUNTER — HOSPITAL ENCOUNTER (OUTPATIENT)
Dept: INTERVENTIONAL RADIOLOGY/VASCULAR | Age: 78
Discharge: HOME | End: 2023-10-19

## 2023-10-19 ENCOUNTER — APPOINTMENT (OUTPATIENT)
Dept: INTERVENTIONAL RADIOLOGY/VASCULAR | Age: 78
End: 2023-10-19
Payer: COMMERCIAL

## 2023-10-19 VITALS
OXYGEN SATURATION: 99 % | WEIGHT: 234.79 LBS | SYSTOLIC BLOOD PRESSURE: 130 MMHG | DIASTOLIC BLOOD PRESSURE: 70 MMHG | HEART RATE: 70 BPM | BODY MASS INDEX: 35.58 KG/M2 | HEIGHT: 68 IN | TEMPERATURE: 98.2 F | RESPIRATION RATE: 14 BRPM

## 2023-10-19 LAB
ACETAMINOPHEN LEVEL: <5 UG/ML (ref 15–30)
ALBUMIN FLUID: 0.5 GM/DL
ALBUMIN SERPL-MCNC: 2.9 GM/DL (ref 3.4–5)
ALP BLD-CCNC: 107 IU/L (ref 40–128)
ALT SERPL-CCNC: 11 U/L (ref 10–40)
ANION GAP SERPL CALCULATED.3IONS-SCNC: 12 MMOL/L (ref 4–16)
AST SERPL-CCNC: 24 IU/L (ref 15–37)
BILIRUB SERPL-MCNC: 1 MG/DL (ref 0–1)
BUN SERPL-MCNC: 29 MG/DL (ref 6–23)
CALCIUM SERPL-MCNC: 9.9 MG/DL (ref 8.3–10.6)
CHLORIDE BLD-SCNC: 101 MMOL/L (ref 99–110)
CHOLEST SERPL-MCNC: 150 MG/DL
CO2: 26 MMOL/L (ref 21–32)
CREAT SERPL-MCNC: 1.5 MG/DL (ref 0.9–1.3)
DOSE AMOUNT: ABNORMAL
DOSE TIME: ABNORMAL
FLUID TYPE: NORMAL INDEX
GFR SERPL CREATININE-BSD FRML MDRD: 48 ML/MIN/1.73M2
GLUCOSE SERPL-MCNC: 94 MG/DL (ref 70–99)
HDLC SERPL-MCNC: 56 MG/DL
LDLC SERPL CALC-MCNC: 82 MG/DL
LYMPHOCYTES, BODY FLUID: 30 %
MESOTHELIAL FLUID: 1 /100 WBC
MONOCYTE, FLUID: 53 %
NEUTROPHIL, FLUID: 17 %
OTHER CELLS FLUID: 0
POTASSIUM SERPL-SCNC: 4 MMOL/L (ref 3.5–5.1)
PROCALCITONIN SERPL-MCNC: 0.17 NG/ML
RBC FLUID: 4000 /CU MM
SODIUM BLD-SCNC: 139 MMOL/L (ref 135–145)
SOURCE FLUID: NORMAL
T4 FREE SERPL-MCNC: 0.99 NG/DL (ref 0.9–1.8)
TOTAL PROTEIN: 6.1 GM/DL (ref 6.4–8.2)
TRIGL SERPL-MCNC: 62 MG/DL
TSH SERPL DL<=0.005 MIU/L-ACNC: 2.69 UIU/ML (ref 0.27–4.2)
WBC FLUID: 139 /CU MM

## 2023-10-19 PROCEDURE — 96366 THER/PROPH/DIAG IV INF ADDON: CPT

## 2023-10-19 PROCEDURE — 36415 COLL VENOUS BLD VENIPUNCTURE: CPT

## 2023-10-19 PROCEDURE — 6360000002 HC RX W HCPCS: Performed by: STUDENT IN AN ORGANIZED HEALTH CARE EDUCATION/TRAINING PROGRAM

## 2023-10-19 PROCEDURE — 99222 1ST HOSP IP/OBS MODERATE 55: CPT | Performed by: SPECIALIST

## 2023-10-19 PROCEDURE — 6370000000 HC RX 637 (ALT 250 FOR IP): Performed by: STUDENT IN AN ORGANIZED HEALTH CARE EDUCATION/TRAINING PROGRAM

## 2023-10-19 PROCEDURE — 2709999900 IR US GUIDED PARACENTESIS

## 2023-10-19 PROCEDURE — G0378 HOSPITAL OBSERVATION PER HR: HCPCS

## 2023-10-19 PROCEDURE — 89051 BODY FLUID CELL COUNT: CPT

## 2023-10-19 PROCEDURE — 84145 PROCALCITONIN (PCT): CPT

## 2023-10-19 PROCEDURE — 82042 OTHER SOURCE ALBUMIN QUAN EA: CPT

## 2023-10-19 PROCEDURE — 80061 LIPID PANEL: CPT

## 2023-10-19 PROCEDURE — 96372 THER/PROPH/DIAG INJ SC/IM: CPT

## 2023-10-19 PROCEDURE — 87205 SMEAR GRAM STAIN: CPT

## 2023-10-19 PROCEDURE — 94761 N-INVAS EAR/PLS OXIMETRY MLT: CPT

## 2023-10-19 PROCEDURE — 49083 ABD PARACENTESIS W/IMAGING: CPT

## 2023-10-19 PROCEDURE — 6360000002 HC RX W HCPCS: Performed by: RADIOLOGY

## 2023-10-19 PROCEDURE — 87070 CULTURE OTHR SPECIMN AEROBIC: CPT

## 2023-10-19 PROCEDURE — 96375 TX/PRO/DX INJ NEW DRUG ADDON: CPT

## 2023-10-19 PROCEDURE — 96376 TX/PRO/DX INJ SAME DRUG ADON: CPT

## 2023-10-19 PROCEDURE — P9047 ALBUMIN (HUMAN), 25%, 50ML: HCPCS | Performed by: RADIOLOGY

## 2023-10-19 PROCEDURE — 80053 COMPREHEN METABOLIC PANEL: CPT

## 2023-10-19 PROCEDURE — 2500000003 HC RX 250 WO HCPCS: Performed by: RADIOLOGY

## 2023-10-19 PROCEDURE — 96365 THER/PROPH/DIAG IV INF INIT: CPT

## 2023-10-19 PROCEDURE — 2580000003 HC RX 258: Performed by: STUDENT IN AN ORGANIZED HEALTH CARE EDUCATION/TRAINING PROGRAM

## 2023-10-19 RX ORDER — LIDOCAINE HYDROCHLORIDE 10 MG/ML
INJECTION, SOLUTION EPIDURAL; INFILTRATION; INTRACAUDAL; PERINEURAL PRN
Status: COMPLETED | OUTPATIENT
Start: 2023-10-19 | End: 2023-10-19

## 2023-10-19 RX ORDER — ALBUMIN (HUMAN) 12.5 G/50ML
SOLUTION INTRAVENOUS CONTINUOUS PRN
Status: COMPLETED | OUTPATIENT
Start: 2023-10-19 | End: 2023-10-19

## 2023-10-19 RX ADMIN — LACTULOSE 20 G: 20 SOLUTION ORAL at 11:03

## 2023-10-19 RX ADMIN — HEPARIN SODIUM 5000 UNITS: 5000 INJECTION INTRAVENOUS; SUBCUTANEOUS at 05:28

## 2023-10-19 RX ADMIN — ALBUMIN (HUMAN) 12.5 G: 0.25 INJECTION, SOLUTION INTRAVENOUS at 08:45

## 2023-10-19 RX ADMIN — LEVOTHYROXINE SODIUM 50 MCG: 0.05 TABLET ORAL at 11:01

## 2023-10-19 RX ADMIN — RIFAXIMIN 550 MG: 550 TABLET ORAL at 11:01

## 2023-10-19 RX ADMIN — ALBUMIN (HUMAN) 12.5 G: 0.25 INJECTION, SOLUTION INTRAVENOUS at 08:54

## 2023-10-19 RX ADMIN — LACTULOSE 20 G: 20 SOLUTION ORAL at 13:15

## 2023-10-19 RX ADMIN — HEPARIN SODIUM 5000 UNITS: 5000 INJECTION INTRAVENOUS; SUBCUTANEOUS at 13:16

## 2023-10-19 RX ADMIN — LIDOCAINE HYDROCHLORIDE 10 ML: 10 INJECTION, SOLUTION EPIDURAL; INFILTRATION; INTRACAUDAL; PERINEURAL at 08:21

## 2023-10-19 RX ADMIN — ALBUMIN (HUMAN) 12.5 G: 0.25 INJECTION, SOLUTION INTRAVENOUS at 08:46

## 2023-10-19 RX ADMIN — SPIRONOLACTONE 100 MG: 50 TABLET ORAL at 11:01

## 2023-10-19 RX ADMIN — FUROSEMIDE 40 MG: 10 INJECTION, SOLUTION INTRAMUSCULAR; INTRAVENOUS at 11:02

## 2023-10-19 RX ADMIN — SODIUM CHLORIDE, PRESERVATIVE FREE 10 ML: 5 INJECTION INTRAVENOUS at 11:03

## 2023-10-19 NOTE — DISCHARGE SUMMARY
V2.0  Discharge Summary    Name:  Easton Alves /Age/Sex: 1945 (89 y.o. male)   Admit Date: 10/18/2023  Discharge Date: 10/19/23    MRN & CSN:  [id number] & [id number] Encounter Date and Time 10/19/23 12:49 PM EDT    Attending:  Veena Reed MD Discharging Provider: Veena Reed MD       Hospital Course:     Brief HPI: Easton Alves is a 78 y.o. male who presented with AMS    Brief Problem Based Course:   Acute metabolic encephalopathy likely due to hepatic encephalopathy   Mechanical fall  -CT of the head notable for small cephalhematoma, conservative management  -Ammonia on presentation 153  -although unlikely, will get blood cultures and procal to r/o infectious etiology  -IR consulted for para (patient originally scheduled for tomorrow 10/19 as outpatient however required admission today)  -Follow-up fluid analysis from paracentesis to rule out SBP  -On lactulose and rifaximin with 2-3 BM/day  -GI consulted, no further recs, ok for d/c after paracentesis    Decompensated alcohol liver cirrhosis   Refractory Ascites  Multiple admissions for hepatic encephalopathy  -complicated by esophageal varices (never bled, on nadolol), 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 Fluid analysis not suggestive for SBP. Continue paracentesis every 2 weeks and thoracentesis prn  -Continue lactulose and rifaximin  -Pt not a candidate for TIPS due to PSE   - as per outpatient GI documentation, patient recommended repeat evaluation at [address] [place] [address]. The patient expressed appropriate understanding of, and agreement with the discharge recommendations, medications, and plan.      Consults this admission:  IP CONSULT TO GI    Discharge Diagnosis:   AMS (altered mental status)    Hepatic encephalopathy    Discharge Instruction:   Follow up appointments: PCP, GI, nephrology   Primary care physician: Renée Arriola MD within 2 weeks  Diet: cardiac diet and low fat, low

## 2023-10-19 NOTE — CONSULTS
[facility]             MD Sarita Ahn MD Celestino Patches, APRN-CNP             Jaspreet Matias, APRN-CNP             [address] [facility] [address], [address] [facility]             [phone] fax [phone]           Reason for Consult:  cirrhosis with elevated ammonia    Primary Care Physician:  Fatou Quinn MD    History Obtained From:  patient    CC: fall    HISTORY OF PRESENT ILLNESS:              The patient is a 78 y.o.  male known to me with cirrhosis (felt to be alcohol-induced) who has been sober for a few years now. His cirrhosis is complicated by moderate-large varices diagnosed by EGD 1/19/22 (which have never bled- currently on Nadolol), chronic hepatic encephalopathy (despite lactulose and Xifaxan), recurrent ascites (despite maximal tolerated doses of Lasix and Aldactone) and hepatic hydrothorax. He has been refractory to diuretics and is being managed by regular paracentesis every week and thoracentesis as needed. He was referred to [facility] last year for transplant and TIPS evaluation. It was felt that he was not a good candidate for TIPS as it likely would aggravate his underlying PSE and his MELD was too low for transplant. He was encouraged to follow up with [facility] but has not done so. He was admitted now with a fall and head laceration. His ammonia was elevated and he was admitted. he denies abd pain, vomiting, melena, hematochezia. He has been taking his lactulose and has at least 2 BM/d. 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 Initially treated with vancomycin but had a supratherapeutic level, hence, it was substituted by daptomycin. Cumulative 6-week course of therapy will be completed on 3/13/2023.     Anxiety     Chronic heart

## 2023-10-19 NOTE — CARE COORDINATION
10/19/23 1121   Service Assessment   Patient Orientation Alert and Oriented   Cognition Alert   History Provided By Patient   Primary [address] Spouse/Significant Other   Patient's Healthcare Decision Maker is: Legal Next of Kin   PCP Verified by CM Yes   Prior Functional Level Assistance with the following:;Mobility   Current Functional Level Assistance with the following:;Mobility   Can patient return to prior living arrangement Yes   Ability to make needs known: Good   Family able to assist with home care needs: Yes   Would you like for me to discuss the discharge plan with any other family members/significant others, and if so, who? No   Financial Resources Other (Comment)  (commercial insurance)   Community Resources None     CM in to see Pt to initiate discharge planning. Pt from home with his wife. Pt denies the need for home care at this time. Pt denies any needs.  CM following

## 2023-10-19 NOTE — PROGRESS NOTES
6L drained during paracentesis. Fluid sent to lab as ordered. Vitals stable. See MAR for albumin given. Transported back to inpatient room [phone] with this RN.

## 2023-10-20 ENCOUNTER — TELEPHONE (OUTPATIENT)
Dept: FAMILY MEDICINE CLINIC | Age: 78
End: 2023-10-20

## 2023-10-20 NOTE — TELEPHONE ENCOUNTER
Care Transitions Initial Follow Up Call    Outreach made within 2 business days of discharge: Yes    Patient: Casey Newton Patient : 1945   MRN: [id number]  Reason for Admission: There are no discharge diagnoses documented for the most recent discharge.   Discharge Date: 10/19/23       Spoke with: jese for wife to return call

## 2023-10-22 LAB
CULTURE: ABNORMAL
GRAM SMEAR: ABNORMAL
Lab: ABNORMAL
SPECIMEN: ABNORMAL

## 2023-10-23 NOTE — PROGRESS NOTES
IR Procedure at [facility]:  Left message for patient to arrive at 0900 at [facility] on 10/26/2023 for his procedure at 1030. Also went over below instructions and patient told patient  he can take his medications as scheduled. (Paracentesis, Thoracentesis, Thyroid Bx and Injections may have a light breakfast)  2. Follow your directions as prescribed by the doctor for your procedure and medications. 3.   Consult your provider as to when to stop blood thinner  4. Do not take any pain medication within 6 hours of your procedure  5. Do not drink any alcoholic beverages or use any street drugs 24 hours before procedure. 6.   Please wear simple, loose fitting clothing to the hospital.  Do not bring valuables (money,             credit cards, checkbooks, etc.)     7. If you  have a Living Will and Durable Power of  for Healthcare, please bring in a copy. 8.   Please bring picture ID,  insurance card, paperwork from the doctors office            (H & P, Consent,  & card for implantable devices). 9.   Report to the information desk on the ground floor. 10. Take a shower the night before or morning of your procedure, do not apply any lotion, oil or powder. 11. If you are going to be sedated for the procedure, you will need a responsible adult to drive you home.

## 2023-10-24 RX ORDER — DULOXETIN HYDROCHLORIDE 60 MG/1
CAPSULE, DELAYED RELEASE ORAL
Qty: 90 CAPSULE | Refills: 0 | Status: SHIPPED | OUTPATIENT
Start: 2023-10-24

## 2023-10-26 ENCOUNTER — HOSPITAL ENCOUNTER (OUTPATIENT)
Dept: INTERVENTIONAL RADIOLOGY/VASCULAR | Age: 78
Discharge: HOME | End: 2023-10-26
Payer: COMMERCIAL

## 2023-10-26 VITALS
SYSTOLIC BLOOD PRESSURE: 141 MMHG | TEMPERATURE: 98.3 F | DIASTOLIC BLOOD PRESSURE: 74 MMHG | RESPIRATION RATE: 16 BRPM | OXYGEN SATURATION: 93 % | HEART RATE: 96 BPM

## 2023-10-26 DIAGNOSIS — R18.8 OTHER ASCITES: ICD-10-CM

## 2023-10-26 LAB
FLUID TYPE: NORMAL INDEX
GLUCOSE, FLUID: 122 MG/DL
LYMPHOCYTES, BODY FLUID: 16 %
MESOTHELIAL FLUID: 2 /100 WBC
MONOCYTE, FLUID: 67 %
NEUTROPHIL, FLUID: 17 %
OTHER CELLS FLUID: 0
PROTEIN FLUID: 1.1 GM/DL
RBC FLUID: 3000 /CU MM
WBC FLUID: 123 /CU MM

## 2023-10-26 PROCEDURE — 87070 CULTURE OTHR SPECIMN AEROBIC: CPT

## 2023-10-26 PROCEDURE — P9047 ALBUMIN (HUMAN), 25%, 50ML: HCPCS

## 2023-10-26 PROCEDURE — 2500000003 HC RX 250 WO HCPCS: Performed by: RADIOLOGY

## 2023-10-26 PROCEDURE — 2709999900 IR US GUIDED PARACENTESIS

## 2023-10-26 PROCEDURE — 89051 BODY FLUID CELL COUNT: CPT

## 2023-10-26 PROCEDURE — 6360000002 HC RX W HCPCS

## 2023-10-26 PROCEDURE — 49083 ABD PARACENTESIS W/IMAGING: CPT

## 2023-10-26 PROCEDURE — 82945 GLUCOSE OTHER FLUID: CPT

## 2023-10-26 PROCEDURE — 87205 SMEAR GRAM STAIN: CPT

## 2023-10-26 PROCEDURE — 84157 ASSAY OF PROTEIN OTHER: CPT

## 2023-10-26 RX ORDER — ALBUMIN (HUMAN) 12.5 G/50ML
25 SOLUTION INTRAVENOUS ONCE
Status: COMPLETED | OUTPATIENT
Start: 2023-10-26 | End: 2023-10-26

## 2023-10-26 RX ORDER — SODIUM CHLORIDE 0.9 % (FLUSH) 0.9 %
10 SYRINGE (ML) INJECTION PRN
Status: DISCONTINUED | OUTPATIENT
Start: 2023-10-26 | End: 2023-10-27 | Stop reason: HOSPADM

## 2023-10-26 RX ORDER — LIDOCAINE HYDROCHLORIDE 10 MG/ML
INJECTION, SOLUTION EPIDURAL; INFILTRATION; INTRACAUDAL; PERINEURAL PRN
Status: COMPLETED | OUTPATIENT
Start: 2023-10-26 | End: 2023-10-26

## 2023-10-26 RX ADMIN — LIDOCAINE HYDROCHLORIDE 10 ML: 10 INJECTION, SOLUTION EPIDURAL; INFILTRATION; INTRACAUDAL; PERINEURAL at 10:40

## 2023-10-26 RX ADMIN — ALBUMIN (HUMAN) 25 G: 12.5 SOLUTION INTRAVENOUS at 11:13

## 2023-10-26 RX ADMIN — ALBUMIN (HUMAN) 12.5 G: 12.5 SOLUTION INTRAVENOUS at 11:02

## 2023-10-26 NOTE — PROGRESS NOTES
Pt to [facility]. A&O X4. IV started. VSS. No needs at this time. Call light in reach.

## 2023-10-26 NOTE — PROGRESS NOTES
TRANSFER - OUT REPORT:    Verbal report given to Janelle on Vince Hylton being transferred to [facility] for routine post-op       Report consisted of patient's Situation, Background, Assessment and   Recommendations(SBAR). Information from the following report(s) Nurse Handoff Report was reviewed with the receiving nurse. Opportunity for questions and clarification was provided.       Patient transported with:   Registered Nurse

## 2023-10-26 NOTE — PROGRESS NOTES
Pt to post op prior to paracentesis, pt alert and oriented x 4, denies pain, band aid in place.  Ok to discharge

## 2023-10-29 LAB
CULTURE: ABNORMAL
GRAM SMEAR: ABNORMAL
Lab: ABNORMAL
SPECIMEN: ABNORMAL

## 2023-10-30 NOTE — PROGRESS NOTES
IR Procedure at [facility]: Left message on patient's wife Gaby's phone for patient to arrive at 0900 at [facility] on 11/2/2023 for his procedure at 1030. Patient has been given below instructions and that he could take his medications as scheduled. (Paracentesis, Thoracentesis, Thyroid Bx and Injections may have a light breakfast)  2. Follow your directions as prescribed by the doctor for your procedure and medications. 3.   Consult your provider as to when to stop blood thinner  4. Do not take any pain medication within 6 hours of your procedure  5. Do not drink any alcoholic beverages or use any street drugs 24 hours before procedure. 6.   Please wear simple, loose fitting clothing to the hospital.  Do not bring valuables (money,             credit cards, checkbooks, etc.)     7. If you  have a Living Will and Durable Power of  for Healthcare, please bring in a copy. 8.   Please bring picture ID,  insurance card, paperwork from the doctors office            (H & P, Consent,  & card for implantable devices). 9.   Report to the information desk on the ground floor. 10. Take a shower the night before or morning of your procedure, do not apply any lotion, oil or powder. 11. If you are going to be sedated for the procedure, you will need a responsible adult to drive you home.

## 2023-11-02 ENCOUNTER — HOSPITAL ENCOUNTER (OUTPATIENT)
Dept: INTERVENTIONAL RADIOLOGY/VASCULAR | Age: 78
Discharge: HOME | End: 2023-11-02
Payer: COMMERCIAL

## 2023-11-02 VITALS
RESPIRATION RATE: 16 BRPM | SYSTOLIC BLOOD PRESSURE: 148 MMHG | OXYGEN SATURATION: 99 % | DIASTOLIC BLOOD PRESSURE: 73 MMHG | TEMPERATURE: 98.1 F | HEART RATE: 93 BPM

## 2023-11-02 DIAGNOSIS — R18.8 OTHER ASCITES: ICD-10-CM

## 2023-11-02 LAB
FLUID TYPE: NORMAL INDEX
GLUCOSE, FLUID: 126 MG/DL
LYMPHOCYTES, BODY FLUID: 22 %
MESOTHELIAL FLUID: 0 /100 WBC
MONOCYTE, FLUID: 72 %
NEUTROPHIL, FLUID: 6 %
OTHER CELLS FLUID: 0
PROTEIN FLUID: 1.1 GM/DL
RBC FLUID: 333 /CU MM
WBC FLUID: 113 /CU MM

## 2023-11-02 PROCEDURE — P9047 ALBUMIN (HUMAN), 25%, 50ML: HCPCS | Performed by: RADIOLOGY

## 2023-11-02 PROCEDURE — 88108 CYTOPATH CONCENTRATE TECH: CPT | Performed by: PATHOLOGY

## 2023-11-02 PROCEDURE — 2580000003 HC RX 258

## 2023-11-02 PROCEDURE — 89051 BODY FLUID CELL COUNT: CPT

## 2023-11-02 PROCEDURE — 6360000002 HC RX W HCPCS: Performed by: RADIOLOGY

## 2023-11-02 PROCEDURE — 49083 ABD PARACENTESIS W/IMAGING: CPT

## 2023-11-02 PROCEDURE — 82945 GLUCOSE OTHER FLUID: CPT

## 2023-11-02 PROCEDURE — 84157 ASSAY OF PROTEIN OTHER: CPT

## 2023-11-02 PROCEDURE — P9047 ALBUMIN (HUMAN), 25%, 50ML: HCPCS

## 2023-11-02 PROCEDURE — 2500000003 HC RX 250 WO HCPCS: Performed by: RADIOLOGY

## 2023-11-02 PROCEDURE — 6360000002 HC RX W HCPCS

## 2023-11-02 PROCEDURE — 2709999900 IR US GUIDED PARACENTESIS

## 2023-11-02 PROCEDURE — 87070 CULTURE OTHR SPECIMN AEROBIC: CPT

## 2023-11-02 PROCEDURE — 87205 SMEAR GRAM STAIN: CPT

## 2023-11-02 PROCEDURE — 88305 TISSUE EXAM BY PATHOLOGIST: CPT | Performed by: PATHOLOGY

## 2023-11-02 RX ORDER — LIDOCAINE HYDROCHLORIDE 10 MG/ML
INJECTION, SOLUTION EPIDURAL; INFILTRATION; INTRACAUDAL; PERINEURAL PRN
Status: COMPLETED | OUTPATIENT
Start: 2023-11-02 | End: 2023-11-02

## 2023-11-02 RX ORDER — SODIUM CHLORIDE 0.9 % (FLUSH) 0.9 %
10 SYRINGE (ML) INJECTION PRN
Status: DISCONTINUED | OUTPATIENT
Start: 2023-11-02 | End: 2023-11-03 | Stop reason: HOSPADM

## 2023-11-02 RX ORDER — ALBUMIN (HUMAN) 12.5 G/50ML
SOLUTION INTRAVENOUS CONTINUOUS PRN
Status: COMPLETED | OUTPATIENT
Start: 2023-11-02 | End: 2023-11-02

## 2023-11-02 RX ADMIN — LIDOCAINE HYDROCHLORIDE 14 ML: 10 INJECTION, SOLUTION EPIDURAL; INFILTRATION; INTRACAUDAL; PERINEURAL at 10:53

## 2023-11-02 RX ADMIN — ALBUMIN (HUMAN) 62.5 G: 0.25 INJECTION, SOLUTION INTRAVENOUS at 11:14

## 2023-11-02 NOTE — PROGRESS NOTES
TRANSFER - OUT REPORT:    Verbal report given to [address] on [facility] being transferred to [facility](unit) for routine post-op       Report consisted of patient's Situation, Background, Assessment and   Recommendations(SBAR). Information from the following report(s) Nurse Handoff Report was reviewed with the receiving nurse. Opportunity for questions and clarification was provided. Patient transported with:   Registered Nurse    Paracentesis with 11,820 cc of ascitic fluid from para.   820 cc sent to lab

## 2023-11-03 RX ORDER — SPIRONOLACTONE 100 MG/1
TABLET, FILM COATED ORAL
Qty: 60 TABLET | Refills: 0 | Status: SHIPPED | OUTPATIENT
Start: 2023-11-03

## 2023-11-03 NOTE — PROGRESS NOTES
IR Procedure at [facility]: Left message on patient's wife Gaby's phone for patient to arrive at 0900 at [facility] on 11/9/2023, patient has been given below instructions and told her  patient can take his medications as scheduled. (Paracentesis, Thoracentesis, Thyroid Bx and Injections may have a light breakfast)  2. Follow your directions as prescribed by the doctor for your procedure and medications. 3.   Consult your provider as to when to stop blood thinner  4. Do not take any pain medication within 6 hours of your procedure  5. Do not drink any alcoholic beverages or use any street drugs 24 hours before procedure. 6.   Please wear simple, loose fitting clothing to the hospital.  Do not bring valuables (money,             credit cards, checkbooks, etc.)     7. If you  have a Living Will and Durable Power of  for Healthcare, please bring in a copy. 8.   Please bring picture ID,  insurance card, paperwork from the doctors office            (H & P, Consent,  & card for implantable devices). 9.   Report to the information desk on the ground floor. 10. Take a shower the night before or morning of your procedure, do not apply any lotion, oil or powder. 11. If you are going to be sedated for the procedure, you will need a responsible adult to drive you home. Left message advising patient upcoming labs do require 8-12 hour fasting

## 2023-11-05 LAB
CULTURE: NORMAL
Lab: NORMAL
SPECIMEN: NORMAL

## 2023-11-08 LAB
CULTURE: ABNORMAL
GRAM SMEAR: ABNORMAL
Lab: ABNORMAL
SPECIMEN: ABNORMAL

## 2023-11-09 ENCOUNTER — HOSPITAL ENCOUNTER (OUTPATIENT)
Dept: INTERVENTIONAL RADIOLOGY/VASCULAR | Age: 78
Discharge: HOME | End: 2023-11-09
Payer: COMMERCIAL

## 2023-11-09 VITALS
RESPIRATION RATE: 16 BRPM | SYSTOLIC BLOOD PRESSURE: 142 MMHG | DIASTOLIC BLOOD PRESSURE: 68 MMHG | OXYGEN SATURATION: 98 % | TEMPERATURE: 98.5 F | HEART RATE: 81 BPM

## 2023-11-09 DIAGNOSIS — R18.8 OTHER ASCITES: ICD-10-CM

## 2023-11-09 PROCEDURE — 87070 CULTURE OTHR SPECIMN AEROBIC: CPT

## 2023-11-09 PROCEDURE — P9047 ALBUMIN (HUMAN), 25%, 50ML: HCPCS | Performed by: RADIOLOGY

## 2023-11-09 PROCEDURE — 6360000002 HC RX W HCPCS: Performed by: RADIOLOGY

## 2023-11-09 PROCEDURE — 2709999900 IR US GUIDED PARACENTESIS

## 2023-11-09 PROCEDURE — P9047 ALBUMIN (HUMAN), 25%, 50ML: HCPCS

## 2023-11-09 PROCEDURE — 2580000003 HC RX 258

## 2023-11-09 PROCEDURE — 49083 ABD PARACENTESIS W/IMAGING: CPT

## 2023-11-09 PROCEDURE — 2500000003 HC RX 250 WO HCPCS: Performed by: RADIOLOGY

## 2023-11-09 PROCEDURE — 6360000002 HC RX W HCPCS

## 2023-11-09 PROCEDURE — 87205 SMEAR GRAM STAIN: CPT

## 2023-11-09 RX ORDER — ALBUMIN (HUMAN) 12.5 G/50ML
SOLUTION INTRAVENOUS CONTINUOUS PRN
Status: COMPLETED | OUTPATIENT
Start: 2023-11-09 | End: 2023-11-09

## 2023-11-09 RX ORDER — LIDOCAINE HYDROCHLORIDE 10 MG/ML
INJECTION, SOLUTION EPIDURAL; INFILTRATION; INTRACAUDAL; PERINEURAL PRN
Status: COMPLETED | OUTPATIENT
Start: 2023-11-09 | End: 2023-11-09

## 2023-11-09 RX ORDER — SODIUM CHLORIDE 0.9 % (FLUSH) 0.9 %
10 SYRINGE (ML) INJECTION PRN
Status: DISCONTINUED | OUTPATIENT
Start: 2023-11-09 | End: 2023-11-10 | Stop reason: HOSPADM

## 2023-11-09 RX ADMIN — ALBUMIN (HUMAN) 25 G: 0.25 INJECTION, SOLUTION INTRAVENOUS at 10:25

## 2023-11-09 RX ADMIN — LIDOCAINE HYDROCHLORIDE 6 ML: 10 INJECTION, SOLUTION EPIDURAL; INFILTRATION; INTRACAUDAL; PERINEURAL at 10:02

## 2023-11-09 ASSESSMENT — PAIN - FUNCTIONAL ASSESSMENT: PAIN_FUNCTIONAL_ASSESSMENT: NONE - DENIES PAIN

## 2023-11-09 NOTE — PLAN OF CARE
Infusion completed. Pt tolerated without difficulty. IV discontinued and DSD applied. Discharge instructions discussed and questions answered. Pt denies questions. Clothing changed and belongings returned  and secured.  Pt transported to private auto via wheelchair

## 2023-11-09 NOTE — PROGRESS NOTES
TRANSFER - OUT REPORT:    Verbal report given to Cammy RN on Tia Peabody being transferred to [facility](unit) for routine post-op       Report consisted of patient's Situation, Background, Assessment and   Recommendations(SBAR). Information from the following report(s) Nurse Handoff Report was reviewed with the receiving nurse. Opportunity for questions and clarification was provided.       Patient transported with:   Registered Nurse      Paracentesis with 10,050 ml of ascitic fluid

## 2023-11-10 NOTE — H&P
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 Initially treated with vancomycin but had a supratherapeutic level, hence, it was substituted by daptomycin. Cumulative 6-week course of therapy will be completed on 3/13/2023. Anxiety     Chronic heart failure, unspecified heart failure type ([address] ) 02/08/2023    Cirrhosis, alcoholic (HCC)     Diabetes mellitus ([address] [facility])     GERD (gastroesophageal reflux disease) 2000    GERD (gastroesophageal reflux disease)     Hyperlipidemia     Hypertension     Meniere's disease     Portal hypertension (HCC)     Pulmonary nodules     Secondary esophageal varices without bleeding (HCC)     Sleep apnea     SOB (shortness of breath)     Thyroid disease        Past Surgical History:  Past Surgical History:   Procedure Laterality Date    APPENDECTOMY      CHOLECYSTECTOMY      COLONOSCOPY      ENDOSCOPY, COLON, DIAGNOSTIC      FOOT SURGERY      needle removed,not sure which foot, per wife.     TONSILLECTOMY      UPPER GASTROINTESTINAL ENDOSCOPY N/A 1/19/2022    EGD BIOPSY performed by Andre Serrato MD at [address] [place] [address] Drive History:  Social History     Socioeconomic History    Marital status:      Spouse name: Not on file    Number of children: Not on file    Years of education: Not on file    Highest education level: Not on file   Occupational History     Comment: Retired    Tobacco Use    Smoking status: Former     Packs/day: 1.00     Years: 14.00     Additional pack years: 0.00     Total pack years: 14.00     Types: Cigarettes     Quit date: 1976     Years

## 2023-11-12 LAB
CULTURE: NORMAL
Lab: NORMAL
SPECIMEN: NORMAL

## 2023-11-13 NOTE — PROGRESS NOTES
IR Procedure at [facility]:  Left message on patient's wife Gaby's phone for patient to arrive at 0900 for his procedure at 1030. Patient has been given home instructions and told her patient can take his medications as scheduled. (Paracentesis, Thoracentesis, Thyroid Bx and Injections may have a light breakfast)  2. Follow your directions as prescribed by the doctor for your procedure and medications. 3.   Consult your provider as to when to stop blood thinner  4. Do not take any pain medication within 6 hours of your procedure  5. Do not drink any alcoholic beverages or use any street drugs 24 hours before procedure. 6.   Please wear simple, loose fitting clothing to the hospital.  Do not bring valuables (money,             credit cards, checkbooks, etc.)     7. If you  have a Living Will and Durable Power of  for Healthcare, please bring in a copy. 8.   Please bring picture ID,  insurance card, paperwork from the doctors office            (H & P, Consent,  & card for implantable devices). 9.   Report to the information desk on the ground floor. 10. Take a shower the night before or morning of your procedure, do not apply any lotion, oil or powder. 11. If you are going to be sedated for the procedure, you will need a responsible adult to drive you home.

## 2023-11-15 LAB
CULTURE: ABNORMAL
GRAM SMEAR: ABNORMAL
Lab: ABNORMAL
SPECIMEN: ABNORMAL

## 2023-11-15 NOTE — PROGRESS NOTES
IR Procedure at [facility]:  Left message on patient's wife Gaby's phone for patient to arrive at 0900 at [facility] on 11/22/2023 for his procedure at 1030. Also told her patient and take his medications as scheduled. (Paracentesis, Thoracentesis, Thyroid Bx and Injections may have a light breakfast)  2. Follow your directions as prescribed by the doctor for your procedure and medications. 3.   Consult your provider as to when to stop blood thinner  4. Do not take any pain medication within 6 hours of your procedure  5. Do not drink any alcoholic beverages or use any street drugs 24 hours before procedure. 6.   Please wear simple, loose fitting clothing to the hospital.  Do not bring valuables (money,             credit cards, checkbooks, etc.)     7. If you  have a Living Will and Durable Power of  for Healthcare, please bring in a copy. 8.   Please bring picture ID,  insurance card, paperwork from the doctors office            (H & P, Consent,  & card for implantable devices). 9.   Report to the information desk on the ground floor. 10. Take a shower the night before or morning of your procedure, do not apply any lotion, oil or powder. 11. If you are going to be sedated for the procedure, you will need a responsible adult to drive you home.

## 2023-11-16 ENCOUNTER — HOSPITAL ENCOUNTER (OUTPATIENT)
Dept: INTERVENTIONAL RADIOLOGY/VASCULAR | Age: 78
Discharge: HOME | End: 2023-11-16
Payer: COMMERCIAL

## 2023-11-16 VITALS
HEART RATE: 67 BPM | SYSTOLIC BLOOD PRESSURE: 144 MMHG | DIASTOLIC BLOOD PRESSURE: 66 MMHG | OXYGEN SATURATION: 96 % | RESPIRATION RATE: 16 BRPM

## 2023-11-16 DIAGNOSIS — R18.8 OTHER ASCITES: ICD-10-CM

## 2023-11-16 LAB
FLUID TYPE: NORMAL INDEX
LYMPHOCYTES, BODY FLUID: 31 %
MONOCYTE, FLUID: 55 %
NEUTROPHIL, FLUID: 14 %
RBC FLUID: 2000 /CU MM
WBC FLUID: 124 /CU MM

## 2023-11-16 PROCEDURE — 2580000003 HC RX 258

## 2023-11-16 PROCEDURE — 49083 ABD PARACENTESIS W/IMAGING: CPT

## 2023-11-16 PROCEDURE — 2709999900 IR US GUIDED PARACENTESIS

## 2023-11-16 PROCEDURE — 89051 BODY FLUID CELL COUNT: CPT

## 2023-11-16 PROCEDURE — 87070 CULTURE OTHR SPECIMN AEROBIC: CPT

## 2023-11-16 PROCEDURE — 6360000002 HC RX W HCPCS

## 2023-11-16 PROCEDURE — P9047 ALBUMIN (HUMAN), 25%, 50ML: HCPCS

## 2023-11-16 PROCEDURE — 2500000003 HC RX 250 WO HCPCS: Performed by: RADIOLOGY

## 2023-11-16 PROCEDURE — 6360000002 HC RX W HCPCS: Performed by: RADIOLOGY

## 2023-11-16 PROCEDURE — 87205 SMEAR GRAM STAIN: CPT

## 2023-11-16 PROCEDURE — P9047 ALBUMIN (HUMAN), 25%, 50ML: HCPCS | Performed by: RADIOLOGY

## 2023-11-16 RX ORDER — ALBUMIN (HUMAN) 12.5 G/50ML
SOLUTION INTRAVENOUS CONTINUOUS PRN
Status: COMPLETED | OUTPATIENT
Start: 2023-11-16 | End: 2023-11-16

## 2023-11-16 RX ORDER — LIDOCAINE HYDROCHLORIDE 10 MG/ML
INJECTION, SOLUTION EPIDURAL; INFILTRATION; INTRACAUDAL; PERINEURAL PRN
Status: COMPLETED | OUTPATIENT
Start: 2023-11-16 | End: 2023-11-16

## 2023-11-16 RX ADMIN — LIDOCAINE HYDROCHLORIDE 10 ML: 10 INJECTION, SOLUTION EPIDURAL; INFILTRATION; INTRACAUDAL; PERINEURAL at 10:53

## 2023-11-16 RX ADMIN — ALBUMIN (HUMAN) 25 G: 0.25 INJECTION, SOLUTION INTRAVENOUS at 11:07

## 2023-11-16 RX ADMIN — ALBUMIN (HUMAN) 12.5 G: 0.25 INJECTION, SOLUTION INTRAVENOUS at 11:20

## 2023-11-16 RX ADMIN — ALBUMIN (HUMAN) 12.5 G: 0.25 INJECTION, SOLUTION INTRAVENOUS at 11:31

## 2023-11-16 RX ADMIN — ALBUMIN (HUMAN) 12.5 G: 0.25 INJECTION, SOLUTION INTRAVENOUS at 11:42

## 2023-11-16 NOTE — PROGRESS NOTES
TRANSFER - OUT REPORT:    Verbal report given to Kanwal/ Natalia RN on Karina Sánchez being transferred to [facility](unit) for routine post-op       Report consisted of patient's Situation, Background, Assessment and   Recommendations(SBAR). Information from the following report(s) Nurse Handoff Report was reviewed with the receiving nurse. Opportunity for questions and clarification was provided.       Patient transported with:   Registered Nurse    Paracentesis with 9500 ml of ascitic fluid drained

## 2023-11-16 NOTE — PROGRESS NOTES
Patient returned post paracentesis. Vitals stable, albumin infusing per order. Site clean dry intact.

## 2023-11-19 LAB
CULTURE: ABNORMAL
GRAM SMEAR: ABNORMAL
Lab: ABNORMAL
SPECIMEN: ABNORMAL

## 2023-11-22 ENCOUNTER — HOSPITAL ENCOUNTER (OUTPATIENT)
Dept: INTERVENTIONAL RADIOLOGY/VASCULAR | Age: 78
Discharge: HOME | End: 2023-11-22
Payer: COMMERCIAL

## 2023-11-22 VITALS
SYSTOLIC BLOOD PRESSURE: 152 MMHG | HEART RATE: 97 BPM | OXYGEN SATURATION: 96 % | TEMPERATURE: 98.2 F | DIASTOLIC BLOOD PRESSURE: 75 MMHG

## 2023-11-22 DIAGNOSIS — R18.8 OTHER ASCITES: ICD-10-CM

## 2023-11-22 LAB
APTT: 16.3 SECONDS (ref 25.1–37.1)
FLUID TYPE: NORMAL INDEX
GLUCOSE, FLUID: 129 MG/DL
INR BLD: 1 INDEX
LYMPHOCYTES, BODY FLUID: 55 %
MESOTHELIAL FLUID: 12 /100 WBC
MONOCYTE, FLUID: NORMAL %
NEUTROPHIL, FLUID: 14 %
OTHER CELLS FLUID: NORMAL
PROTEIN FLUID: 1.3 GM/DL
PROTHROMBIN TIME: 13.3 SECONDS (ref 11.7–14.5)
RBC FLUID: 752 /CU MM
REASON FOR REJECTION: NORMAL
REJECTED TEST: NORMAL
WBC FLUID: 131 /CU MM

## 2023-11-22 PROCEDURE — 84157 ASSAY OF PROTEIN OTHER: CPT

## 2023-11-22 PROCEDURE — 85027 COMPLETE CBC AUTOMATED: CPT

## 2023-11-22 PROCEDURE — 87205 SMEAR GRAM STAIN: CPT

## 2023-11-22 PROCEDURE — 85610 PROTHROMBIN TIME: CPT

## 2023-11-22 PROCEDURE — 87070 CULTURE OTHR SPECIMN AEROBIC: CPT

## 2023-11-22 PROCEDURE — 85730 THROMBOPLASTIN TIME PARTIAL: CPT

## 2023-11-22 PROCEDURE — 6360000002 HC RX W HCPCS

## 2023-11-22 PROCEDURE — 2709999900 IR US GUIDED PARACENTESIS

## 2023-11-22 PROCEDURE — P9047 ALBUMIN (HUMAN), 25%, 50ML: HCPCS

## 2023-11-22 PROCEDURE — 2500000003 HC RX 250 WO HCPCS: Performed by: RADIOLOGY

## 2023-11-22 PROCEDURE — 82945 GLUCOSE OTHER FLUID: CPT

## 2023-11-22 PROCEDURE — 89051 BODY FLUID CELL COUNT: CPT

## 2023-11-22 PROCEDURE — 49083 ABD PARACENTESIS W/IMAGING: CPT

## 2023-11-22 RX ORDER — ALBUMIN (HUMAN) 12.5 G/50ML
50 SOLUTION INTRAVENOUS ONCE
Status: COMPLETED | OUTPATIENT
Start: 2023-11-22 | End: 2023-11-22

## 2023-11-22 RX ORDER — LIDOCAINE HYDROCHLORIDE 10 MG/ML
INJECTION, SOLUTION EPIDURAL; INFILTRATION; INTRACAUDAL; PERINEURAL PRN
Status: COMPLETED | OUTPATIENT
Start: 2023-11-22 | End: 2023-11-22

## 2023-11-22 RX ADMIN — LIDOCAINE HYDROCHLORIDE 4 ML: 10 INJECTION, SOLUTION EPIDURAL; INFILTRATION; INTRACAUDAL; PERINEURAL at 09:30

## 2023-11-22 RX ADMIN — ALBUMIN (HUMAN) 50 G: 12.5 SOLUTION INTRAVENOUS at 10:18

## 2023-11-22 NOTE — PROGRESS NOTES
TRANSFER - OUT REPORT:    Verbal report given to JANNETH Smith on Giana Duff being transferred to [facility] for routine progression of patient care. Paracentesis performed per Dr. Bobby Castanon. 7350cc ascitic fluid removed. Labs obtained. Vitals remained stable throughout procedure. Albumin administered as ordered. Report consisted of patient's Situation, Background, Assessment and   Recommendations(SBAR). Information from the following report(s) Nurse Handoff Report was reviewed with the receiving nurse. Opportunity for questions and clarification was provided.       Patient transported with:   Registered Nurse

## 2023-11-25 LAB
CULTURE: ABNORMAL
GRAM SMEAR: ABNORMAL
Lab: ABNORMAL
SPECIMEN: ABNORMAL

## 2023-11-27 NOTE — PROGRESS NOTES
IR Procedure at [facility]: Left message for patient on wife Gaby's phone for patient to arrive at  0900 at [facility] on 11/30/2023 for his procedure at 1030. Patient has been given below instructions and told her to have patient take his medications as scheduled. (Paracentesis, Thoracentesis, Thyroid Bx and Injections may have a light breakfast)  2. Follow your directions as prescribed by the doctor for your procedure and medications. 3.   Consult your provider as to when to stop blood thinner  4. Do not take any pain medication within 6 hours of your procedure  5. Do not drink any alcoholic beverages or use any street drugs 24 hours before procedure. 6.   Please wear simple, loose fitting clothing to the hospital.  Do not bring valuables (money,             credit cards, checkbooks, etc.)     7. If you  have a Living Will and Durable Power of  for Healthcare, please bring in a copy. 8.   Please bring picture ID,  insurance card, paperwork from the doctors office            (H & P, Consent,  & card for implantable devices). 9.   Report to the information desk on the ground floor. 10. Take a shower the night before or morning of your procedure, do not apply any lotion, oil or powder. 11. If you are going to be sedated for the procedure, you will need a responsible adult to drive you home.

## 2023-11-30 ENCOUNTER — HOSPITAL ENCOUNTER (OUTPATIENT)
Dept: INTERVENTIONAL RADIOLOGY/VASCULAR | Age: 78
Discharge: HOME | End: 2023-11-30
Payer: COMMERCIAL

## 2023-11-30 VITALS — OXYGEN SATURATION: 94 % | DIASTOLIC BLOOD PRESSURE: 82 MMHG | HEART RATE: 82 BPM | SYSTOLIC BLOOD PRESSURE: 144 MMHG

## 2023-11-30 DIAGNOSIS — R18.8 OTHER ASCITES: ICD-10-CM

## 2023-11-30 LAB
FLUID TYPE: NORMAL INDEX
GLUCOSE, FLUID: 114 MG/DL
HCT VFR BLD CALC: 32.6 % (ref 42–52)
HEMOGLOBIN: 10.1 GM/DL (ref 13.5–18)
LYMPHOCYTES, BODY FLUID: 19 %
MCH RBC QN AUTO: 29.7 PG (ref 27–31)
MCHC RBC AUTO-ENTMCNC: 31 % (ref 32–36)
MCV RBC AUTO: 95.9 FL (ref 78–100)
MESOTHELIAL FLUID: 2 /100 WBC
MONOCYTE, FLUID: 65 %
NEUTROPHIL, FLUID: 13 %
OTHER CELLS FLUID: 3
PDW BLD-RTO: 15.2 % (ref 11.7–14.9)
PLATELET # BLD: 233 K/CU MM (ref 140–440)
PMV BLD AUTO: 9.7 FL (ref 7.5–11.1)
PROTEIN FLUID: 1.3 GM/DL
RBC # BLD: 3.4 M/CU MM (ref 4.6–6.2)
RBC FLUID: 111 /CU MM
WBC # BLD: 7 K/CU MM (ref 4–10.5)
WBC FLUID: 117 /CU MM

## 2023-11-30 PROCEDURE — 89051 BODY FLUID CELL COUNT: CPT

## 2023-11-30 PROCEDURE — 49083 ABD PARACENTESIS W/IMAGING: CPT

## 2023-11-30 PROCEDURE — 85027 COMPLETE CBC AUTOMATED: CPT

## 2023-11-30 PROCEDURE — 2580000003 HC RX 258

## 2023-11-30 PROCEDURE — 2709999900 IR US GUIDED PARACENTESIS

## 2023-11-30 PROCEDURE — P9047 ALBUMIN (HUMAN), 25%, 50ML: HCPCS

## 2023-11-30 PROCEDURE — 6360000002 HC RX W HCPCS

## 2023-11-30 PROCEDURE — 2500000003 HC RX 250 WO HCPCS: Performed by: RADIOLOGY

## 2023-11-30 PROCEDURE — 87070 CULTURE OTHR SPECIMN AEROBIC: CPT

## 2023-11-30 PROCEDURE — 84157 ASSAY OF PROTEIN OTHER: CPT

## 2023-11-30 PROCEDURE — 87205 SMEAR GRAM STAIN: CPT

## 2023-11-30 PROCEDURE — P9047 ALBUMIN (HUMAN), 25%, 50ML: HCPCS | Performed by: SPECIALIST

## 2023-11-30 PROCEDURE — 82945 GLUCOSE OTHER FLUID: CPT

## 2023-11-30 PROCEDURE — 6360000002 HC RX W HCPCS: Performed by: SPECIALIST

## 2023-11-30 RX ORDER — SODIUM CHLORIDE 0.9 % (FLUSH) 0.9 %
10 SYRINGE (ML) INJECTION PRN
Status: DISCONTINUED | OUTPATIENT
Start: 2023-11-30 | End: 2023-12-01 | Stop reason: HOSPADM

## 2023-11-30 RX ORDER — ALBUMIN (HUMAN) 12.5 G/50ML
SOLUTION INTRAVENOUS CONTINUOUS PRN
Status: COMPLETED | OUTPATIENT
Start: 2023-11-30 | End: 2023-11-30

## 2023-11-30 RX ORDER — LIDOCAINE HYDROCHLORIDE 10 MG/ML
INJECTION, SOLUTION EPIDURAL; INFILTRATION; INTRACAUDAL; PERINEURAL PRN
Status: COMPLETED | OUTPATIENT
Start: 2023-11-30 | End: 2023-11-30

## 2023-11-30 RX ADMIN — LIDOCAINE HYDROCHLORIDE 10 ML: 10 INJECTION, SOLUTION EPIDURAL; INFILTRATION; INTRACAUDAL; PERINEURAL at 09:23

## 2023-11-30 RX ADMIN — ALBUMIN (HUMAN) 37.5 G: 0.25 INJECTION, SOLUTION INTRAVENOUS at 10:13

## 2023-11-30 NOTE — PROGRESS NOTES
TRANSFER - OUT REPORT:    Verbal report given to Leitha Carrel on Annice Breath being transferred to [facility] for routine post-op       Report consisted of patient's Situation, Background, Assessment and   Recommendations(SBAR). 6970 cc of milky white ascitic fluid removed during paracentesis. Information from the following report(s) Nurse Handoff Report was reviewed with the receiving nurse. Opportunity for questions and clarification was provided.       Patient transported with:   Registered Nurse

## 2023-12-01 NOTE — H&P
[email or web address]    Smokeless tobacco: Never   Vaping Use    Vaping Use: Never used   Substance and Sexual Activity    Alcohol use: Not Currently     Alcohol/week: 6.0 standard drinks of alcohol     Types: 6 Cans of beer per week    Drug use: Never    Sexual activity: Yes     Partners: Female   Other Topics Concern    Not on file   Social History Narrative    Not on file     Social Determinants of Health     Financial Resource Strain: Low Risk  (3/11/2022)    Overall Financial Resource Strain (CARDIA)     Difficulty of Paying Living Expenses: Not hard at all   Food Insecurity: No Food Insecurity (3/11/2022)    Hunger Vital Sign     Worried About Running Out of Food in the Last Year: Never true     Ran Out of Food in the Last Year: Never true   Transportation Needs: Not on file   Physical Activity: Not on file   Stress: Not on file   Social Connections: Not on file   Intimate Partner Violence: Not on file   Housing Stability: Not on file       Family History:  Family History   Problem Relation Age of Onset    Hypertension Brother     Diabetes Other        Allergies:   Allergies   Allergen Reactions    Lisinopril Swelling     Tongue swelling      Zetia [Ezetimibe] Swelling     Facial swelling    Atorvastatin     Codeine Other (See Comments)     Blood pressure drops    Hydrochlorothiazide     Hydroxyzine      Fatigue and depressed    Lexapro [Escitalopram Oxalate]      Increased depression    Pravastatin     Daptomycin Rash and Other (See Comments)     Hands drawn involuntarily       Medications:  Current Outpatient Medications on File Prior to Encounter   Medication Sig Dispense Refill    spironolactone (ALDACTONE) 100 MG tablet Take 1 tablet by mouth twice daily 60 tablet 0    DULoxetine (CYMBALTA) 60 MG extended release capsule Take 1 capsule by mouth once daily 90 capsule 0    furosemide (LASIX) 40 MG tablet Take 1 tablet by mouth daily 60 tablet 3    levothyroxine (SYNTHROID) 50 MCG tablet Take 1 tablet by mouth once

## 2023-12-03 LAB
CULTURE: NORMAL
Lab: NORMAL
SPECIMEN: NORMAL

## 2023-12-05 NOTE — PROGRESS NOTES
IR Procedure at [facility]:  Left message on patient's wife Gaby's phone for patient to arrive at 0900 on 12/7/2023 for his procedure at 1030. Patient has been given below instructions. (Paracentesis, Thoracentesis, Thyroid Bx and Injections may have a light breakfast)  2. Follow your directions as prescribed by the doctor for your procedure and medications. 3.   Consult your provider as to when to stop blood thinner  4. Do not take any pain medication within 6 hours of your procedure  5. Do not drink any alcoholic beverages or use any street drugs 24 hours before procedure. 6.   Please wear simple, loose fitting clothing to the hospital.  Do not bring valuables (money,             credit cards, checkbooks, etc.)     7. If you  have a Living Will and Durable Power of  for Healthcare, please bring in a copy. 8.   Please bring picture ID,  insurance card, paperwork from the doctors office            (H & P, Consent,  & card for implantable devices). 9.   Report to the information desk on the ground floor. 10. Take a shower the night before or morning of your procedure, do not apply any lotion, oil or powder. 11. If you are going to be sedated for the procedure, you will need a responsible adult to drive you home.

## 2023-12-07 ENCOUNTER — HOSPITAL ENCOUNTER (OUTPATIENT)
Dept: INTERVENTIONAL RADIOLOGY/VASCULAR | Age: 78
Discharge: HOME | End: 2023-12-07
Payer: COMMERCIAL

## 2023-12-07 VITALS
OXYGEN SATURATION: 97 % | TEMPERATURE: 97.8 F | HEART RATE: 90 BPM | RESPIRATION RATE: 20 BRPM | DIASTOLIC BLOOD PRESSURE: 79 MMHG | SYSTOLIC BLOOD PRESSURE: 137 MMHG

## 2023-12-07 DIAGNOSIS — R18.8 OTHER ASCITES: ICD-10-CM

## 2023-12-07 LAB
GLUCOSE, FLUID: 120 MG/DL
PROTEIN FLUID: 1.1 GM/DL

## 2023-12-07 PROCEDURE — P9047 ALBUMIN (HUMAN), 25%, 50ML: HCPCS

## 2023-12-07 PROCEDURE — 2580000003 HC RX 258

## 2023-12-07 PROCEDURE — 87205 SMEAR GRAM STAIN: CPT

## 2023-12-07 PROCEDURE — 84157 ASSAY OF PROTEIN OTHER: CPT

## 2023-12-07 PROCEDURE — 6360000002 HC RX W HCPCS

## 2023-12-07 PROCEDURE — 87070 CULTURE OTHR SPECIMN AEROBIC: CPT

## 2023-12-07 PROCEDURE — 2500000003 HC RX 250 WO HCPCS: Performed by: RADIOLOGY

## 2023-12-07 PROCEDURE — 49083 ABD PARACENTESIS W/IMAGING: CPT

## 2023-12-07 PROCEDURE — P9047 ALBUMIN (HUMAN), 25%, 50ML: HCPCS | Performed by: SPECIALIST

## 2023-12-07 PROCEDURE — 82945 GLUCOSE OTHER FLUID: CPT

## 2023-12-07 PROCEDURE — 6360000002 HC RX W HCPCS: Performed by: SPECIALIST

## 2023-12-07 PROCEDURE — C1729 CATH, DRAINAGE: HCPCS

## 2023-12-07 RX ORDER — LIDOCAINE HYDROCHLORIDE 10 MG/ML
INJECTION, SOLUTION EPIDURAL; INFILTRATION; INTRACAUDAL; PERINEURAL PRN
Status: COMPLETED | OUTPATIENT
Start: 2023-12-07 | End: 2023-12-07

## 2023-12-07 RX ORDER — ALBUMIN (HUMAN) 12.5 G/50ML
SOLUTION INTRAVENOUS CONTINUOUS PRN
Status: COMPLETED | OUTPATIENT
Start: 2023-12-07 | End: 2023-12-07

## 2023-12-07 RX ADMIN — LIDOCAINE HYDROCHLORIDE 10 ML: 10 INJECTION, SOLUTION EPIDURAL; INFILTRATION; INTRACAUDAL; PERINEURAL at 09:51

## 2023-12-07 RX ADMIN — ALBUMIN (HUMAN) 50 G: 0.25 INJECTION, SOLUTION INTRAVENOUS at 10:24

## 2023-12-07 NOTE — PROGRESS NOTES
Arrived back to [facility] from IR.  Discharge papers printed and given to patient

## 2023-12-07 NOTE — PROGRESS NOTES
TRANSFER - OUT REPORT:    Verbal report given to Lainey/JANNETH Luis on Janet Estes being transferred to [facility] for routine post-op       Report consisted of patient's Situation, Background, Assessment and   Recommendations(SBAR). 8570cc milky white ascitic fluid removed. Information from the following report(s) Nurse Handoff Report was reviewed with the receiving nurse. Opportunity for questions and clarification was provided.       Patient transported with:   Registered Nurse

## 2023-12-09 LAB
CULTURE: NORMAL
Lab: NORMAL
SPECIMEN: NORMAL

## 2023-12-11 RX ORDER — BETAMETHASONE VALERATE 0.1 %
LOTION (ML) TOPICAL
Qty: 60 ML | Refills: 3 | Status: SHIPPED | OUTPATIENT
Start: 2023-12-11

## 2023-12-11 NOTE — PROGRESS NOTES
IR Procedure at [facility]: left message on patient's wife Gaby's phone for patient to arrive at 0830 at [facility] on 12/14/2023. Also went over below instructions and for patient to take his medications as scheduled. (Paracentesis, Thoracentesis, Thyroid Bx and Injections may have a light breakfast)  2. Follow your directions as prescribed by the doctor for your procedure and medications. 3.   Consult your provider as to when to stop blood thinner  4. Do not take any pain medication within 6 hours of your procedure  5. Do not drink any alcoholic beverages or use any street drugs 24 hours before procedure. 6.   Please wear simple, loose fitting clothing to the hospital.  Do not bring valuables (money,             credit cards, checkbooks, etc.)     7. If you  have a Living Will and Durable Power of  for Healthcare, please bring in a copy. 8.   Please bring picture ID,  insurance card, paperwork from the doctors office            (H & P, Consent,  & card for implantable devices). 9.   Report to the information desk on the ground floor. 10. Take a shower the night before or morning of your procedure, do not apply any lotion, oil or powder. 11. If you are going to be sedated for the procedure, you will need a responsible adult to drive you home.

## 2023-12-12 LAB
CULTURE: ABNORMAL
CULTURE: ABNORMAL
GRAM SMEAR: ABNORMAL
Lab: ABNORMAL
Lab: ABNORMAL
SPECIMEN: ABNORMAL
SPECIMEN: ABNORMAL

## 2023-12-14 ENCOUNTER — HOSPITAL ENCOUNTER (OUTPATIENT)
Dept: INTERVENTIONAL RADIOLOGY/VASCULAR | Age: 78
Discharge: HOME | End: 2023-12-14

## 2023-12-14 DIAGNOSIS — R18.8 OTHER ASCITES: ICD-10-CM

## 2023-12-15 NOTE — FLOWSHEET NOTE
[x] daily progress note       [] discharge       Patient Name:  Liv Ochoa   :  1945 MRN: [id number]  Room:  [address] [place] [address] Date of Admission: 2022  Rehabilitation Diagnosis:   Hepatic failure, unspecified with coma [K72.91]  Hepatic encephalopathy ([facility] [address] [K72.90]       Date 2022       Day of ARU Week:  4   Time IN/OUT 2137-4900  1327-x  [id number]   Individual Tx Minutes 15+60   TOTAL Tx Time Mins 75   Variance Time -45   Variance Time []   Refusal due to:     []   Medical hold/reason:    []   Illness   []   Off Unit for test/procedure  []   Extra time needed to complete task  []   Therapeutic need  [x]   Other (specify): See subjective   Restrictions Restrictions/Precautions  Restrictions/Precautions: Fall Risk,General Precautions  Position Activity Restriction  Other position/activity restrictions: , PICC line RUE, silva catheter, 2L O2   Communication with other providers: [x]   OK to see per nursing:     []   Spoke with team member regarding:      Subjective observations and cognitive status: AM: Pt was seen sleeping in recliner at beginning of treatment session. Pt was woken up when performing sternal rub but quickly fell back to sleep. Continued to perform sternal rub w/ verbal stimulation to improve pt arousal to participate in treatment session. Pt required moderate verbal stimulation to keep eyes open. Pt would not respond to questions asked. When asked to sit up pt did perform but sat back into chair and fell asleep again. Performed sternal rubs to re-wake pt and Pt then stated \" ma'am I am tired and not doing exercises\". PM 1: Pt was supine in bed alseep. Pt was awoken by verbally. Pt stated he was too tired to participate in therapy. Offered to come back in an hour and pt agreed. PM2: pt was asleep laying supine in bed. Pt spouse was present in room. Pt wife stated \" pt has been very down since last night and did not sleep well\".  Pt wife and therapist was able to arouse pt and get him to agree to sit EOB. Pt stated \" I would like to sit EOB more, I have been so tired. \" pt stated \" I knew I was going to be tired after a Dr. Jeancarlos Duque in last night and said that I may have cancer and I thought about it all night. \" Pt agreed to participate in therapy. Pain level/location:    0/10       Location:    Discharge recommendations  Anticipated discharge date:  TBD   Destination: []??home alone   []? ?home alone with assist PRN     []? ? home w/ family      []? ? Continuous supervision  []? ?SNF    []? ? Assisted living     []? ? Other:  Continued therapy: [x]? ?HHC PT  []??OUTPATIENT PT   []? ? No Further PT  []? ?SNF PT  Caregiver training recommended: []? ?Yes  []? ? No   Equipment needs: likely 2WW         Bed Mobility:           [x]   Pt received out of bed   Rolling R/L:  Set up assist for silva cath  Lying --> Sit:  Supervision use of bed rails     Transfers:    Sit--> Stand:  SBA  Stand --> Sit:   SBA  Assistive device required for transfer:   RW    Gait:    Distance:  364'   Assistance:  SBA  Device:  2WW  Gait Quality:  Slow reciprocal gait pattern      Additional Therapeutic activities/exercises completed this date:     []   Nu-step:  Time:        Level:         #Steps:       []   Rebounder:    []  Seated     []  Standing        [x]   Balance training: standing in 2WW reaching at various heights w/ normal LORENZO for disc throw x 3 min and x 5 min w/ seated rest break between. []   Postural training    []   Supine ther ex (reps/sets):     [x]   Seated ther ex (reps/sets): sitting EOB marching in place, LAQ's, heel raises, toe raises, hamstring curls, hip abd, hip add w/ ball iso x 15 ea for strengthening.       []   Standing ther ex (reps/sets):     []   Picking up object from floor (standing):                   []   Reacher used   [x]   Other: sitting EOB improved upright posture cuing w/ deep breathing techniques provided   []   Other:    Comments: pt required moderate verbal encouragement and education for participation during treatment session. In PM pt was willing to participate and stated he felt better after treatment session ended. Patient/Caregiver Education and Training:   [x]   Bed Mobility/Transfer technique/safety  [x]   Gait technique/sequencing  [x]   Proper use of assistive device  []   Advanced mobility safety and technique  []   Reinforced patient's precautions/mobility while maintaining precautions  []   Postural awareness  []   Family training  []   Progress was updated and reviewed in Rehabtracker with patient and/or family this date. Treatment Plan for Next Session: gait, balance, therapeutic exercises, advanced gait      Assessment: AM: This pt demonstrated no response to today's treatment. Pt treatment session in AM was focused on improving pt arousal state to improve tolerance and participation in therapy. PM: The patient is making progress toward established goals as evidenced by QI scores. Ongoing deficits are observed in the areas of strength and endurance and self limiting behaviors and continued focus on these is recommended.      Treatment/Activity Tolerance:   [] Tolerated treatment with no adverse effects    [x] Patient limited by fatigue  [] Patient limited by pain   [] Patient limited by medical complications:    [] Adverse reaction to Tx:   [] Significant change in status    Safety:       []  bed alarm set    [x]  chair alarm set    []  Pt refused alarms                [x]  Telesitter activated      [x]  Gait belt used during tx session      [x]other: Pt left sitting up in recliner asleep w/ call light at end of treatment session in AM. Pt left sitting up in recliner at end of treatment session w/ call light and spouse present in PM.        Number of Minutes/Billable Intervention  Gait Training 15   Therapeutic Exercise 30   Neuro Re-Ed    Therapeutic Activity 30   Wheelchair Propulsion    Group    Other:    TOTAL 75         Social History  Social/Functional History  Lives With: Spouse  Type of Home: House  Home Layout: One level  Home Access: Stairs to enter with rails  Entrance Stairs - Number of Steps: 4 CORAZON front of the home and 5 CORAZON at the back of the home. HRs c both sets of stairs. Entrance Stairs - Rails: Both  Bathroom Shower/Tub: Walk-in shower  Bathroom Toilet: Standard  Bathroom Equipment:  (Per Pt's spouse, Pt has no equipment in the home.)  Bathroom Accessibility: Accessible  Has the patient had two or more falls in the past year or any fall with injury in the past year?: Yes (Pt reports 2 falls in the last year--fall when pulled by dog. Second fall on back porch due to LOB and stairs.)  ADL Assistance: Independent  Homemaking Assistance: Needs assistance  Homemaking Responsibilities: Yes  Meal Prep Responsibility: No (Wife completes.)  Laundry Responsibility: No (Wife completes.)  Cleaning Responsibility: No (Wife completes.)  Bill Paying/Finance Responsibility: No (Wife completes.)  Shopping Responsibility: No (Wife completes.)  Health Care Management: Primary (Pt manages medications c 7-day pillbox.)  Ambulation Assistance: Independent  Transfer Assistance: Independent  Active : Yes  Mode of Transportation: Car,Truck,Motorcycle  Education: 8th grade; some difficulty in school  Occupation: Retired  Type of Occupation:   Leisure & Hobbies: Likes to work on cars, cats and a [place] monika, wife does grocery shopping, out to eat. Pt manages medications c 7-day pillbox. Wife manages bills. Pt manages yardwork  IADL Comments: Likes to surf the internet  Additional Comments: Pt reports 2 falls in the last year--fall when pulled by dog. Second fall on back porch due to LOB and stairs. Objective                                                                                    Goals:  (Update in navigator)   :   Long Term Goals  Time Frame for Long term goals : 7-10 tx days:  Long term goal 1: Pt will complete bed mobility (scooting, rolling R/L, and sup<->sit) Ind.  Long term goal 2: Pt will complete OOB transfers Mod Ind-Ind. Long term goal 3: Pt will ambulate >/=150 ft over level surface and at least 10 ft of uneven surface using 2WW Mod Ind. Long term goal 4: Pt will ascend/descend curb step using 2WW and 1 flight of stairs using railings Mod Ind. Long term goal 5: Pt will complete object retrieval from the floor with 2WW and reacher Mod Ind.:        Plan of Care                                                                              Times per week: 5 days per week for a minimum of 60 minutes/day plus group as appropriate for 60 minutes.   Treatment to include Current Treatment Recommendations: Strengthening,Balance training,Functional mobility training,Transfer training,Cognitive/Perceptual training,Endurance training,Gait training,Stair training,Cognitive reorientation,Home exercise program,Safety education & training,Patient/Caregiver education & training,Equipment evaluation, education, & procurement,Therapeutic activities    Electronically signed by   Yusef Bazan PTA, [id number]  5/21/2022, 8:29 AM acetaminophen 500 mg oral tablet: 2 tab(s) orally every 6 hours as needed for  mild pain or fever  atorvastatin 20 mg oral tablet: 1 tab(s) orally once a day (in the evening)  dorzolamide 2% ophthalmic solution: 2 drop(s) in each eye 2 times a day  famotidine 20 mg oral tablet: 1 tab(s) orally once a day as needed for  heartburn  ibuprofen 400 mg oral tablet: 1 tab(s) orally every 6 hours as needed for  moderate pain  latanoprost 0.005% ophthalmic solution: 1 drop(s) in each eye once a day (at bedtime)  Multiple Vitamins oral tablet: 1 tab(s) orally once a day

## 2023-12-20 NOTE — PROGRESS NOTES
IR Procedure at [facility]:  Mailbox is full, I will try to call back later. ADDENDUM: Left message on patient's wife Gaby's phone for patient to arrive at 0830 at [facility] on 12/28/2023 for his procedure at 1000. Patient has been given below instructions. (Paracentesis, Thoracentesis, Thyroid Bx and Injections may have a light breakfast)  2. Follow your directions as prescribed by the doctor for your procedure and medications. 3.   Consult your provider as to when to stop blood thinner  4. Do not take any pain medication within 6 hours of your procedure  5. Do not drink any alcoholic beverages or use any street drugs 24 hours before procedure. 6.   Please wear simple, loose fitting clothing to the hospital.  Do not bring valuables (money,             credit cards, checkbooks, etc.)     7. If you  have a Living Will and Durable Power of  for Healthcare, please bring in a copy. 8.   Please bring picture ID,  insurance card, paperwork from the doctors office            (H & P, Consent,  & card for implantable devices). 9.   Report to the information desk on the ground floor. 10. Take a shower the night before or morning of your procedure, do not apply any lotion, oil or powder. 11. If you are going to be sedated for the procedure, you will need a responsible adult to drive you home.

## 2023-12-26 RX ORDER — SPIRONOLACTONE 100 MG/1
TABLET, FILM COATED ORAL
Qty: 60 TABLET | Refills: 0 | Status: SHIPPED | OUTPATIENT
Start: 2023-12-26

## 2023-12-28 ENCOUNTER — HOSPITAL ENCOUNTER (OUTPATIENT)
Dept: INTERVENTIONAL RADIOLOGY/VASCULAR | Age: 78
Discharge: HOME | End: 2023-12-28
Payer: COMMERCIAL

## 2023-12-28 VITALS
HEART RATE: 89 BPM | SYSTOLIC BLOOD PRESSURE: 154 MMHG | DIASTOLIC BLOOD PRESSURE: 81 MMHG | RESPIRATION RATE: 12 BRPM | TEMPERATURE: 98.1 F | WEIGHT: 250 LBS | HEIGHT: 68 IN | BODY MASS INDEX: 37.89 KG/M2 | OXYGEN SATURATION: 100 %

## 2023-12-28 DIAGNOSIS — R18.8 OTHER ASCITES: ICD-10-CM

## 2023-12-28 LAB
APTT: 33 SECONDS (ref 25.1–37.1)
HCT VFR BLD CALC: 31.7 % (ref 42–52)
HEMOGLOBIN: 9.9 GM/DL (ref 13.5–18)
INR BLD: 1.1 INDEX
MCH RBC QN AUTO: 28.8 PG (ref 27–31)
MCHC RBC AUTO-ENTMCNC: 31.2 % (ref 32–36)
MCV RBC AUTO: 92.2 FL (ref 78–100)
PDW BLD-RTO: 15.5 % (ref 11.7–14.9)
PLATELET # BLD: 255 K/CU MM (ref 140–440)
PMV BLD AUTO: 9.6 FL (ref 7.5–11.1)
PROTHROMBIN TIME: 14.8 SECONDS (ref 11.7–14.5)
RBC # BLD: 3.44 M/CU MM (ref 4.6–6.2)
WBC # BLD: 6.3 K/CU MM (ref 4–10.5)

## 2023-12-28 PROCEDURE — 6360000002 HC RX W HCPCS: Performed by: NURSE PRACTITIONER

## 2023-12-28 PROCEDURE — P9047 ALBUMIN (HUMAN), 25%, 50ML: HCPCS

## 2023-12-28 PROCEDURE — 85027 COMPLETE CBC AUTOMATED: CPT

## 2023-12-28 PROCEDURE — 2580000003 HC RX 258

## 2023-12-28 PROCEDURE — 87070 CULTURE OTHR SPECIMN AEROBIC: CPT

## 2023-12-28 PROCEDURE — 85610 PROTHROMBIN TIME: CPT

## 2023-12-28 PROCEDURE — P9047 ALBUMIN (HUMAN), 25%, 50ML: HCPCS | Performed by: NURSE PRACTITIONER

## 2023-12-28 PROCEDURE — 49083 ABD PARACENTESIS W/IMAGING: CPT

## 2023-12-28 PROCEDURE — 2500000003 HC RX 250 WO HCPCS: Performed by: RADIOLOGY

## 2023-12-28 PROCEDURE — 87205 SMEAR GRAM STAIN: CPT

## 2023-12-28 PROCEDURE — 85730 THROMBOPLASTIN TIME PARTIAL: CPT

## 2023-12-28 PROCEDURE — 6360000002 HC RX W HCPCS

## 2023-12-28 PROCEDURE — 2709999900 IR US GUIDED PARACENTESIS

## 2023-12-28 RX ORDER — ALBUMIN (HUMAN) 12.5 G/50ML
SOLUTION INTRAVENOUS CONTINUOUS PRN
Status: COMPLETED | OUTPATIENT
Start: 2023-12-28 | End: 2023-12-28

## 2023-12-28 RX ORDER — LIDOCAINE HYDROCHLORIDE 10 MG/ML
INJECTION, SOLUTION EPIDURAL; INFILTRATION; INTRACAUDAL; PERINEURAL PRN
Status: COMPLETED | OUTPATIENT
Start: 2023-12-28 | End: 2023-12-28

## 2023-12-28 RX ORDER — SODIUM CHLORIDE 0.9 % (FLUSH) 0.9 %
10 SYRINGE (ML) INJECTION PRN
Status: DISCONTINUED | OUTPATIENT
Start: 2023-12-28 | End: 2023-12-29 | Stop reason: HOSPADM

## 2023-12-28 RX ADMIN — ALBUMIN (HUMAN) 50 G: 0.25 INJECTION, SOLUTION INTRAVENOUS at 10:02

## 2023-12-28 RX ADMIN — LIDOCAINE HYDROCHLORIDE 10 ML: 10 INJECTION, SOLUTION EPIDURAL; INFILTRATION; INTRACAUDAL; PERINEURAL at 09:26

## 2023-12-28 NOTE — PROGRESS NOTES
IR PREPROCEDURE NOTE    12/28/23    US GUIDED PARACENTESIS EXPLAINED TO PT INCLUDING RISKS, BENEFITS AND ALTERNATIVES AND PT ELECTS TO PROCEED WITH PARACENTESIS.     [address] [place] [address]

## 2023-12-28 NOTE — BRIEF OP NOTE
Brief Postoperative Note      Patient: Curly Vaughn  YOB: 1945  MRN: [id number]    Date of Procedure: 12/28/2023    * No Diagnosis Codes entered *ASCITES    Post-Op Diagnosis: Same       * No procedures listed *US GUIDED PARACENTESIS    * No surgeons listed *[address] [place] [address]    Assistant:  * No surgical staff found *    Anesthesia: * No anesthesia type entered *LOCAL    Estimated Blood Loss (mL): Minimal    Complications: None    Specimens:   ID Type Source Tests Collected by Time Destination   A :  Body Fluid Ascitic Fluid CYTOLOGY, NON-GYN, CULTURE, BODY FLUID Denny Connors RN 12/28/2023 8617        Implants:  * No implants in log *      Drains: * No LDAs found *    Findings: 7770 ML CLOUDY CHYLOUS/WHITE FLUID REMOVED FROM PERITONEAL SPACE. NO COMPLICATION SUGGESTED.   DRESSING APPLIED      Electronically signed by Savanna Limon DO on 12/28/2023 at 11:08 AM

## 2023-12-28 NOTE — PROGRESS NOTES
TRANSFER - OUT REPORT:    Verbal report given to Nanda/JANNETH Retana on Ashley Sena being transferred IR holding for routine post-op       Report consisted of patient's Situation, Background, Assessment and   Recommendations(SBAR). 7770cc milky white ascitic fluid removed during paracentesis. 50 grams albumin given. Information from the following report(s) Nurse Handoff Report was reviewed with the receiving nurse. Opportunity for questions and clarification was provided.       Patient transported with:   Registered Nurse

## 2023-12-29 NOTE — PROGRESS NOTES
IR Procedure at [facility]:  Left message on patient's wife Gaby's phone for patient to arrive at 0730 at [facility] on 1/3/2024 for his procedure at 0900. Below instructions have been given and patient can take his medications as scheduled.       (Paracentesis, Thoracentesis, Thyroid Bx and Injections may have a light breakfast)  2.   Follow your directions as prescribed by the doctor for your procedure and medications.  3.   Consult your provider as to when to stop blood thinner  4.   Do not take any pain medication within 6 hours of your procedure  5.   Do not drink any alcoholic beverages or use any street drugs 24 hours before procedure.  6.   Please wear simple, loose fitting clothing to the hospital.  Do not bring valuables (money,             credit cards, checkbooks, etc.)     7.   If you  have a Living Will and Durable Power of  for Healthcare, please bring in a copy.  8.   Please bring picture ID,  insurance card, paperwork from the doctors office            (H & P, Consent,  & card for implantable devices).  9.   Report to the information desk on the ground floor.  10. Take a shower the night before or morning of your procedure, do not apply any lotion, oil or powder.  11. If you are going to be sedated for the procedure, you will need a responsible adult to drive you home.

## 2023-12-31 LAB
CULTURE: NORMAL
Lab: NORMAL
SPECIMEN: NORMAL

## 2024-01-02 RX ORDER — RIFAXIMIN 550 MG/1
550 TABLET ORAL 2 TIMES DAILY
Qty: 60 TABLET | Refills: 0 | Status: SHIPPED | OUTPATIENT
Start: 2024-01-02

## 2024-01-03 ENCOUNTER — HOSPITAL ENCOUNTER (OUTPATIENT)
Dept: INTERVENTIONAL RADIOLOGY/VASCULAR | Age: 79
Discharge: HOME | End: 2024-01-03
Payer: COMMERCIAL

## 2024-01-03 VITALS
HEIGHT: 66 IN | WEIGHT: 250 LBS | BODY MASS INDEX: 40.18 KG/M2 | SYSTOLIC BLOOD PRESSURE: 151 MMHG | HEART RATE: 90 BPM | OXYGEN SATURATION: 99 % | DIASTOLIC BLOOD PRESSURE: 87 MMHG

## 2024-01-03 DIAGNOSIS — R18.8 OTHER ASCITES: ICD-10-CM

## 2024-01-03 LAB
ALBUMIN FLUID: 0.4 GM/DL
AMYLASE FLUID: 14 U/L
FLUID TYPE: NORMAL INDEX
GLUCOSE, FLUID: 123 MG/DL
LACTATE DEHYDROGENASE, FLUID: 35 IU/L
LYMPHOCYTES, BODY FLUID: 36 %
MESOTHELIAL FLUID: 3 /100 WBC
MONOCYTE, FLUID: NORMAL %
NEUTROPHIL, FLUID: 5 %
OTHER CELLS FLUID: NORMAL
PROTEIN FLUID: 1.2 GM/DL
RBC FLUID: NORMAL /CU MM
SOURCE FLUID: NORMAL
WBC FLUID: 75 /CU MM

## 2024-01-03 PROCEDURE — 82945 GLUCOSE OTHER FLUID: CPT

## 2024-01-03 PROCEDURE — 6360000002 HC RX W HCPCS

## 2024-01-03 PROCEDURE — C1729 CATH, DRAINAGE: HCPCS

## 2024-01-03 PROCEDURE — 49083 ABD PARACENTESIS W/IMAGING: CPT

## 2024-01-03 PROCEDURE — P9047 ALBUMIN (HUMAN), 25%, 50ML: HCPCS

## 2024-01-03 PROCEDURE — 82042 OTHER SOURCE ALBUMIN QUAN EA: CPT

## 2024-01-03 PROCEDURE — 2500000003 HC RX 250 WO HCPCS: Performed by: RADIOLOGY

## 2024-01-03 PROCEDURE — 2580000003 HC RX 258

## 2024-01-03 PROCEDURE — 87070 CULTURE OTHR SPECIMN AEROBIC: CPT

## 2024-01-03 PROCEDURE — P9047 ALBUMIN (HUMAN), 25%, 50ML: HCPCS | Performed by: RADIOLOGY

## 2024-01-03 PROCEDURE — 87205 SMEAR GRAM STAIN: CPT

## 2024-01-03 PROCEDURE — 82150 ASSAY OF AMYLASE: CPT

## 2024-01-03 PROCEDURE — 84157 ASSAY OF PROTEIN OTHER: CPT

## 2024-01-03 PROCEDURE — 89051 BODY FLUID CELL COUNT: CPT

## 2024-01-03 PROCEDURE — 83615 LACTATE (LD) (LDH) ENZYME: CPT

## 2024-01-03 PROCEDURE — 6360000002 HC RX W HCPCS: Performed by: RADIOLOGY

## 2024-01-03 RX ORDER — LIDOCAINE HYDROCHLORIDE 10 MG/ML
INJECTION, SOLUTION EPIDURAL; INFILTRATION; INTRACAUDAL; PERINEURAL PRN
Status: COMPLETED | OUTPATIENT
Start: 2024-01-03 | End: 2024-01-03

## 2024-01-03 RX ORDER — SODIUM CHLORIDE 0.9 % (FLUSH) 0.9 %
10 SYRINGE (ML) INJECTION PRN
Status: DISCONTINUED | OUTPATIENT
Start: 2024-01-03 | End: 2024-01-04 | Stop reason: HOSPADM

## 2024-01-03 RX ORDER — ALBUMIN (HUMAN) 12.5 G/50ML
SOLUTION INTRAVENOUS CONTINUOUS PRN
Status: COMPLETED | OUTPATIENT
Start: 2024-01-03 | End: 2024-01-03

## 2024-01-03 RX ADMIN — LIDOCAINE HYDROCHLORIDE 8 ML: 10 INJECTION, SOLUTION EPIDURAL; INFILTRATION; INTRACAUDAL; PERINEURAL at 10:11

## 2024-01-03 RX ADMIN — ALBUMIN (HUMAN) 50 G: 0.25 INJECTION, SOLUTION INTRAVENOUS at 10:12

## 2024-01-03 ASSESSMENT — PAIN - FUNCTIONAL ASSESSMENT: PAIN_FUNCTIONAL_ASSESSMENT: NONE - DENIES PAIN

## 2024-01-03 NOTE — PROGRESS NOTES
Patient returned to [facility].  Set of vitals taken.  Dressing dry and intact.

## 2024-01-03 NOTE — PROGRESS NOTES
TRANSFER - OUT REPORT:    Verbal report given toJANNETH Chawla and JANNETH Fisher [facility].  Paracentesis performed per Dr. Fitch.  9100cc removed.  62.5 mg of albumin adminsitered.  Labs sent.      Report consisted of patient's Situation, Background, Assessment and   Recommendations(SBAR).     Information from the following report(s) Nurse Handoff Report was reviewed with the receiving nurse.    Opportunity for questions and clarification was provided.      Patient transported with:   Registered Nurse

## 2024-01-04 NOTE — H&P
pk-yrs)     Types: Cigarettes     Start date:      Quit date:      Years since quittin.0    Smokeless tobacco: Never   Vaping Use    Vaping Use: Never used   Substance and Sexual Activity    Alcohol use: Not Currently     Alcohol/week: 6.0 standard drinks of alcohol     Types: 6 Cans of beer per week    Drug use: Never    Sexual activity: Yes     Partners: Female   Other Topics Concern    Not on file   Social History Narrative    Not on file     Social Determinants of Health     Financial Resource Strain: Low Risk  (3/11/2022)    Overall Financial Resource Strain (CARDIA)     Difficulty of Paying Living Expenses: Not hard at all   Food Insecurity: No Food Insecurity (3/11/2022)    Hunger Vital Sign     Worried About Running Out of Food in the Last Year: Never true     Ran Out of Food in the Last Year: Never true   Transportation Needs: Not on file   Physical Activity: Not on file   Stress: Not on file   Social Connections: Not on file   Intimate Partner Violence: Not on file   Housing Stability: Not on file       Family History:  Family History   Problem Relation Age of Onset    Hypertension Brother     Diabetes Other        Allergies:  Allergies   Allergen Reactions    Lisinopril Swelling     Tongue swelling      Zetia [Ezetimibe] Swelling     Facial swelling    Atorvastatin     Codeine Other (See Comments)     Blood pressure drops    Hydrochlorothiazide     Hydroxyzine      Fatigue and depressed    Lexapro [Escitalopram Oxalate]      Increased depression    Pravastatin     Daptomycin Rash and Other (See Comments)     Hands drawn involuntarily       Medications:  Current Outpatient Medications on File Prior to Encounter   Medication Sig Dispense Refill    XIFAXAN 550 MG tablet Take 1 tablet by mouth twice daily 60 tablet 0    spironolactone (ALDACTONE) 100 MG tablet Take 1 tablet by mouth twice daily 60 tablet 0    betamethasone valerate (VALISONE) 0.1 % lotion Apply topically 2 times daily prn scalp

## 2024-01-06 LAB
CULTURE: NORMAL
Lab: NORMAL
SPECIMEN: NORMAL

## 2024-01-08 LAB
CULTURE: ABNORMAL
GRAM SMEAR: ABNORMAL
Lab: ABNORMAL
SPECIMEN: ABNORMAL

## 2024-01-08 NOTE — PROGRESS NOTES
IR Procedure at [facility]:  Spoke with patient's wife Gaby and he will arrive at 0800 at [facility] on 1/11/2024 for his procedure at 0930. Also went  over below instructions and told her patient can take his medications as scheduled.       (Paracentesis, Thoracentesis, Thyroid Bx and Injections may have a light breakfast)  2.   Follow your directions as prescribed by the doctor for your procedure and medications.  3.   Consult your provider as to when to stop blood thinner  4.   Do not take any pain medication within 6 hours of your procedure  5.   Do not drink any alcoholic beverages or use any street drugs 24 hours before procedure.  6.   Please wear simple, loose fitting clothing to the hospital.  Do not bring valuables (money,             credit cards, checkbooks, etc.)     7.   If you  have a Living Will and Durable Power of  for Healthcare, please bring in a copy.  8.   Please bring picture ID,  insurance card, paperwork from the doctors office            (H & P, Consent,  & card for implantable devices).  9.   Report to the information desk on the ground floor.  10. Take a shower the night before or morning of your procedure, do not apply any lotion, oil or powder.  11. If you are going to be sedated for the procedure, you will need a responsible adult to drive you home.

## 2024-01-09 DIAGNOSIS — E03.9 ACQUIRED HYPOTHYROIDISM: ICD-10-CM

## 2024-01-09 RX ORDER — LEVOTHYROXINE SODIUM 0.05 MG/1
TABLET ORAL
Qty: 90 TABLET | Refills: 0 | Status: SHIPPED | OUTPATIENT
Start: 2024-01-09

## 2024-01-11 ENCOUNTER — HOSPITAL ENCOUNTER (OUTPATIENT)
Dept: INTERVENTIONAL RADIOLOGY/VASCULAR | Age: 79
Discharge: HOME | End: 2024-01-11
Payer: COMMERCIAL

## 2024-01-11 VITALS
SYSTOLIC BLOOD PRESSURE: 150 MMHG | HEART RATE: 79 BPM | OXYGEN SATURATION: 97 % | RESPIRATION RATE: 16 BRPM | DIASTOLIC BLOOD PRESSURE: 89 MMHG

## 2024-01-11 DIAGNOSIS — K72.90 LIVER FAILURE (HCC): ICD-10-CM

## 2024-01-11 LAB
ALBUMIN FLUID: 0.7 GM/DL
AMYLASE FLUID: 20 U/L
FLUID TYPE: NORMAL INDEX
LACTATE DEHYDROGENASE, FLUID: 42 IU/L
LYMPHOCYTES, BODY FLUID: 33 %
MESOTHELIAL FLUID: 18 /100 WBC
MONOCYTE, FLUID: 59 %
NEUTROPHIL, FLUID: 8 %
PROTEIN FLUID: 1.4 GM/DL
RBC FLUID: <2000 /CU MM
SOURCE FLUID: NORMAL
WBC FLUID: 123 /CU MM

## 2024-01-11 PROCEDURE — 82150 ASSAY OF AMYLASE: CPT

## 2024-01-11 PROCEDURE — 49083 ABD PARACENTESIS W/IMAGING: CPT

## 2024-01-11 PROCEDURE — P9047 ALBUMIN (HUMAN), 25%, 50ML: HCPCS

## 2024-01-11 PROCEDURE — 84157 ASSAY OF PROTEIN OTHER: CPT

## 2024-01-11 PROCEDURE — 6360000002 HC RX W HCPCS

## 2024-01-11 PROCEDURE — 87205 SMEAR GRAM STAIN: CPT

## 2024-01-11 PROCEDURE — 82042 OTHER SOURCE ALBUMIN QUAN EA: CPT

## 2024-01-11 PROCEDURE — 83615 LACTATE (LD) (LDH) ENZYME: CPT

## 2024-01-11 PROCEDURE — 2500000003 HC RX 250 WO HCPCS: Performed by: RADIOLOGY

## 2024-01-11 PROCEDURE — C1729 CATH, DRAINAGE: HCPCS

## 2024-01-11 PROCEDURE — 2580000003 HC RX 258

## 2024-01-11 PROCEDURE — 87070 CULTURE OTHR SPECIMN AEROBIC: CPT

## 2024-01-11 PROCEDURE — 89051 BODY FLUID CELL COUNT: CPT

## 2024-01-11 RX ORDER — SODIUM CHLORIDE 0.9 % (FLUSH) 0.9 %
10 SYRINGE (ML) INJECTION PRN
Status: DISCONTINUED | OUTPATIENT
Start: 2024-01-11 | End: 2024-01-12 | Stop reason: HOSPADM

## 2024-01-11 RX ORDER — LIDOCAINE HYDROCHLORIDE 10 MG/ML
INJECTION, SOLUTION EPIDURAL; INFILTRATION; INTRACAUDAL; PERINEURAL PRN
Status: COMPLETED | OUTPATIENT
Start: 2024-01-11 | End: 2024-01-11

## 2024-01-11 RX ADMIN — LIDOCAINE HYDROCHLORIDE 10 ML: 10 INJECTION, SOLUTION EPIDURAL; INFILTRATION; INTRACAUDAL; PERINEURAL at 10:13

## 2024-01-11 NOTE — PROGRESS NOTES
Pt returned from paracentesis, VSS, band aid in place clean dry intact, albumin infusing, ok to DC

## 2024-01-11 NOTE — BRIEF OP NOTE
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  61.5 g iv albumin given.  Dressing applied.  No complication suggested.       Electronically signed by Paul Madrigal DO on 1/11/2024 at 11:10 AM

## 2024-01-11 NOTE — PROGRESS NOTES
IR PREPORCEDURE NOTE    1/11/24    US GUIDED PARACENTESIS DISCUSSED WITH PT INCLUDING RISKS, BENEFITS AND ALTERNATIVES AND PT ELECTS TO PROCEED WITH PARACENTESIS.    WINSTON LOPEZ DO

## 2024-01-12 RX ORDER — RIFAXIMIN 550 MG/1
550 TABLET ORAL 2 TIMES DAILY
Qty: 60 TABLET | Refills: 0 | OUTPATIENT
Start: 2024-01-12

## 2024-01-12 NOTE — PROGRESS NOTES
IR Procedure at [facility]:Left message on patient's wife Gaby's phone for patient to arrive at 0800 at [facility] on 1/18/2024 for his procedure at 0930. Patient will take his medications as scheduled and has been given below instructions.        (Paracentesis, Thoracentesis, Thyroid Bx and Injections may have a light breakfast)  2.   Follow your directions as prescribed by the doctor for your procedure and medications.  3.   Consult your provider as to when to stop blood thinner  4.   Do not take any pain medication within 6 hours of your procedure  5.   Do not drink any alcoholic beverages or use any street drugs 24 hours before procedure.  6.   Please wear simple, loose fitting clothing to the hospital.  Do not bring valuables (money,             credit cards, checkbooks, etc.)     7.   If you  have a Living Will and Durable Power of  for Healthcare, please bring in a copy.  8.   Please bring picture ID,  insurance card, paperwork from the doctors office            (H & P, Consent,  & card for implantable devices).  9.   Report to the information desk on the ground floor.  10. Take a shower the night before or morning of your procedure, do not apply any lotion, oil or powder.  11. If you are going to be sedated for the procedure, you will need a responsible adult to drive you home.

## 2024-01-14 LAB
CULTURE: ABNORMAL
GRAM SMEAR: ABNORMAL
Lab: ABNORMAL
SPECIMEN: ABNORMAL

## 2024-01-16 ENCOUNTER — OFFICE VISIT (OUTPATIENT)
Dept: FAMILY MEDICINE CLINIC | Age: 79
End: 2024-01-16
Payer: COMMERCIAL

## 2024-01-16 VITALS
RESPIRATION RATE: 20 BRPM | BODY MASS INDEX: 40.32 KG/M2 | HEIGHT: 66 IN | DIASTOLIC BLOOD PRESSURE: 64 MMHG | SYSTOLIC BLOOD PRESSURE: 138 MMHG | OXYGEN SATURATION: 95 % | WEIGHT: 250.9 LBS | HEART RATE: 92 BPM

## 2024-01-16 DIAGNOSIS — E11.9 TYPE 2 DIABETES MELLITUS WITHOUT COMPLICATION, WITHOUT LONG-TERM CURRENT USE OF INSULIN (HCC): Primary | ICD-10-CM

## 2024-01-16 DIAGNOSIS — K76.82: ICD-10-CM

## 2024-01-16 DIAGNOSIS — K72.90 DECOMPENSATION OF CIRRHOSIS OF LIVER (HCC): ICD-10-CM

## 2024-01-16 DIAGNOSIS — J98.01 BRONCHOSPASM: ICD-10-CM

## 2024-01-16 DIAGNOSIS — F41.9 ANXIETY: ICD-10-CM

## 2024-01-16 DIAGNOSIS — K70.31 ALCOHOLIC CIRRHOSIS OF LIVER WITH ASCITES (HCC): ICD-10-CM

## 2024-01-16 DIAGNOSIS — H91.93 BILATERAL HEARING LOSS, UNSPECIFIED HEARING LOSS TYPE: ICD-10-CM

## 2024-01-16 DIAGNOSIS — E03.9 ACQUIRED HYPOTHYROIDISM: ICD-10-CM

## 2024-01-16 DIAGNOSIS — N18.31 STAGE 3A CHRONIC KIDNEY DISEASE (HCC): ICD-10-CM

## 2024-01-16 DIAGNOSIS — K74.60 DECOMPENSATION OF CIRRHOSIS OF LIVER (HCC): ICD-10-CM

## 2024-01-16 DIAGNOSIS — Z91.81 AT HIGH RISK FOR FALLS: ICD-10-CM

## 2024-01-16 PROCEDURE — 1123F ACP DISCUSS/DSCN MKR DOCD: CPT | Performed by: FAMILY MEDICINE

## 2024-01-16 PROCEDURE — 90694 VACC AIIV4 NO PRSRV 0.5ML IM: CPT | Performed by: FAMILY MEDICINE

## 2024-01-16 PROCEDURE — 3078F DIAST BP <80 MM HG: CPT | Performed by: FAMILY MEDICINE

## 2024-01-16 PROCEDURE — 3075F SYST BP GE 130 - 139MM HG: CPT | Performed by: FAMILY MEDICINE

## 2024-01-16 PROCEDURE — 99214 OFFICE O/P EST MOD 30 MIN: CPT | Performed by: FAMILY MEDICINE

## 2024-01-16 PROCEDURE — 90471 IMMUNIZATION ADMIN: CPT | Performed by: FAMILY MEDICINE

## 2024-01-16 RX ORDER — SPIRONOLACTONE 100 MG/1
100 TABLET, FILM COATED ORAL 2 TIMES DAILY
Qty: 60 TABLET | Refills: 0 | Status: SHIPPED | OUTPATIENT
Start: 2024-01-16

## 2024-01-16 RX ORDER — DULOXETIN HYDROCHLORIDE 60 MG/1
60 CAPSULE, DELAYED RELEASE ORAL DAILY
Qty: 90 CAPSULE | Refills: 0 | Status: SHIPPED | OUTPATIENT
Start: 2024-01-16

## 2024-01-16 RX ORDER — LEVOTHYROXINE SODIUM 0.05 MG/1
50 TABLET ORAL DAILY
Qty: 90 TABLET | Refills: 0 | Status: SHIPPED | OUTPATIENT
Start: 2024-01-16

## 2024-01-16 RX ORDER — ALBUTEROL SULFATE 90 UG/1
AEROSOL, METERED RESPIRATORY (INHALATION)
Qty: 1 EACH | Refills: 2 | Status: SHIPPED | OUTPATIENT
Start: 2024-01-16

## 2024-01-16 SDOH — ECONOMIC STABILITY: FOOD INSECURITY: WITHIN THE PAST 12 MONTHS, THE FOOD YOU BOUGHT JUST DIDN'T LAST AND YOU DIDN'T HAVE MONEY TO GET MORE.: NEVER TRUE

## 2024-01-16 SDOH — ECONOMIC STABILITY: HOUSING INSECURITY
IN THE LAST 12 MONTHS, WAS THERE A TIME WHEN YOU DID NOT HAVE A STEADY PLACE TO SLEEP OR SLEPT IN A SHELTER (INCLUDING NOW)?: NO

## 2024-01-16 SDOH — ECONOMIC STABILITY: INCOME INSECURITY: HOW HARD IS IT FOR YOU TO PAY FOR THE VERY BASICS LIKE FOOD, HOUSING, MEDICAL CARE, AND HEATING?: NOT HARD AT ALL

## 2024-01-16 SDOH — ECONOMIC STABILITY: FOOD INSECURITY: WITHIN THE PAST 12 MONTHS, YOU WORRIED THAT YOUR FOOD WOULD RUN OUT BEFORE YOU GOT MONEY TO BUY MORE.: NEVER TRUE

## 2024-01-16 ASSESSMENT — PATIENT HEALTH QUESTIONNAIRE - PHQ9
1. LITTLE INTEREST OR PLEASURE IN DOING THINGS: 0
2. FEELING DOWN, DEPRESSED OR HOPELESS: 0
SUM OF ALL RESPONSES TO PHQ9 QUESTIONS 1 & 2: 0
SUM OF ALL RESPONSES TO PHQ QUESTIONS 1-9: 0

## 2024-01-16 ASSESSMENT — ENCOUNTER SYMPTOMS
NAUSEA: 0
CHEST TIGHTNESS: 0
BLOOD IN STOOL: 0
ABDOMINAL DISTENTION: 1
TROUBLE SWALLOWING: 0
VOMITING: 0
ABDOMINAL PAIN: 0
SHORTNESS OF BREATH: 0
WHEEZING: 0
EYE PAIN: 0
DIARRHEA: 0

## 2024-01-16 NOTE — PROGRESS NOTES
1/16/2024    Boby Glasgow    Chief Complaint   Patient presents with    Medication Refill     Medication refills.        HPI  History was obtained from the patient.  Boby is a 78 y.o. male who presents today with recheck.  Patient has known alcoholic cirrhosis with massive ascites requires weekly paracentesis per interventional radiology.  Other issues include hypothyroidism, diabetes mellitus type 2, bronchospasm, CKD 3, anxiety, and impaired hearing.  Overall has been holding his own.  Mood remains positive energy levels are good.  No intercurrent problems of chest pain or shortness of breath-no new neurologic deficits reported.  With frequent paracenteses and current medical regimen his and hepatic encephalopathy is doing well.  Patient had labs in May and then in October of this year last A1c 5.5%.  Appetite remains good energy is fair.  On review patient does need to get a high-dose flu shot today and was advised to get COVID and RSV at pharmacy in near future.    REVIEW OF SYMPTOMS    Review of Systems   Constitutional:  Negative for activity change and fatigue.   HENT:  Positive for hearing loss. Negative for congestion, mouth sores and trouble swallowing.    Eyes:  Negative for pain and visual disturbance.   Respiratory:  Negative for chest tightness, shortness of breath and wheezing.    Cardiovascular:  Negative for chest pain and palpitations.        Patient denies chest pain.   Gastrointestinal:  Positive for abdominal distention. Negative for abdominal pain, blood in stool, diarrhea, nausea and vomiting.        Patient with alcoholic cirrhosis and recurrent ascites.  Has weekly paracenteses per IR .has known hepatic encephalopathy and is on appropriate therapy for same.   Endocrine: Negative for polydipsia and polyuria.        Patient with known history of hypothyroidism and mild diabetes mellitus type 2   Genitourinary:  Negative for dysuria, frequency and urgency.   Musculoskeletal:  Negative for

## 2024-01-18 ENCOUNTER — HOSPITAL ENCOUNTER (OUTPATIENT)
Dept: INTERVENTIONAL RADIOLOGY/VASCULAR | Age: 79
Discharge: HOME | End: 2024-01-18
Payer: COMMERCIAL

## 2024-01-18 VITALS
HEIGHT: 68 IN | RESPIRATION RATE: 14 BRPM | SYSTOLIC BLOOD PRESSURE: 142 MMHG | HEART RATE: 89 BPM | DIASTOLIC BLOOD PRESSURE: 78 MMHG | BODY MASS INDEX: 37.89 KG/M2 | TEMPERATURE: 98.2 F | OXYGEN SATURATION: 99 % | WEIGHT: 250 LBS

## 2024-01-18 DIAGNOSIS — R18.8 OTHER ASCITES: ICD-10-CM

## 2024-01-18 LAB
APTT: 34.9 SECONDS (ref 25.1–37.1)
HCT VFR BLD CALC: 30.4 % (ref 42–52)
HEMOGLOBIN: 9.5 GM/DL (ref 13.5–18)
INR BLD: 1.1 INDEX
MCH RBC QN AUTO: 29.1 PG (ref 27–31)
MCHC RBC AUTO-ENTMCNC: 31.3 % (ref 32–36)
MCV RBC AUTO: 93 FL (ref 78–100)
PDW BLD-RTO: 15.6 % (ref 11.7–14.9)
PLATELET # BLD: 221 K/CU MM (ref 140–440)
PMV BLD AUTO: 9 FL (ref 7.5–11.1)
PROTHROMBIN TIME: 14.7 SECONDS (ref 11.7–14.5)
RBC # BLD: 3.27 M/CU MM (ref 4.6–6.2)
WBC # BLD: 5.2 K/CU MM (ref 4–10.5)

## 2024-01-18 PROCEDURE — P9047 ALBUMIN (HUMAN), 25%, 50ML: HCPCS | Performed by: RADIOLOGY

## 2024-01-18 PROCEDURE — 87205 SMEAR GRAM STAIN: CPT

## 2024-01-18 PROCEDURE — 2500000003 HC RX 250 WO HCPCS: Performed by: RADIOLOGY

## 2024-01-18 PROCEDURE — 85730 THROMBOPLASTIN TIME PARTIAL: CPT

## 2024-01-18 PROCEDURE — C1729 CATH, DRAINAGE: HCPCS

## 2024-01-18 PROCEDURE — 2580000003 HC RX 258

## 2024-01-18 PROCEDURE — 49083 ABD PARACENTESIS W/IMAGING: CPT

## 2024-01-18 PROCEDURE — 6360000002 HC RX W HCPCS: Performed by: RADIOLOGY

## 2024-01-18 PROCEDURE — 88305 TISSUE EXAM BY PATHOLOGIST: CPT | Performed by: PATHOLOGY

## 2024-01-18 PROCEDURE — 6360000002 HC RX W HCPCS

## 2024-01-18 PROCEDURE — 85027 COMPLETE CBC AUTOMATED: CPT

## 2024-01-18 PROCEDURE — 85610 PROTHROMBIN TIME: CPT

## 2024-01-18 PROCEDURE — 87070 CULTURE OTHR SPECIMN AEROBIC: CPT

## 2024-01-18 PROCEDURE — P9047 ALBUMIN (HUMAN), 25%, 50ML: HCPCS

## 2024-01-18 PROCEDURE — 88108 CYTOPATH CONCENTRATE TECH: CPT | Performed by: PATHOLOGY

## 2024-01-18 RX ORDER — ALBUMIN (HUMAN) 12.5 G/50ML
SOLUTION INTRAVENOUS CONTINUOUS PRN
Status: COMPLETED | OUTPATIENT
Start: 2024-01-18 | End: 2024-01-18

## 2024-01-18 RX ORDER — LIDOCAINE HYDROCHLORIDE 10 MG/ML
INJECTION, SOLUTION EPIDURAL; INFILTRATION; INTRACAUDAL; PERINEURAL PRN
Status: COMPLETED | OUTPATIENT
Start: 2024-01-18 | End: 2024-01-18

## 2024-01-18 RX ORDER — SODIUM CHLORIDE 0.9 % (FLUSH) 0.9 %
10 SYRINGE (ML) INJECTION PRN
Status: DISCONTINUED | OUTPATIENT
Start: 2024-01-18 | End: 2024-01-19 | Stop reason: HOSPADM

## 2024-01-18 RX ADMIN — LIDOCAINE HYDROCHLORIDE 9 ML: 10 INJECTION, SOLUTION EPIDURAL; INFILTRATION; INTRACAUDAL; PERINEURAL at 08:51

## 2024-01-18 RX ADMIN — ALBUMIN (HUMAN) 50 G: 0.25 INJECTION, SOLUTION INTRAVENOUS at 09:21

## 2024-01-18 NOTE — PROGRESS NOTES
Pt returned from his procedure, vss, no bleeding noted, and iv removed.  Discharge instructions reviewed.

## 2024-01-18 NOTE — H&P
Outpatient Medications on File Prior to Encounter   Medication Sig Dispense Refill    albuterol sulfate HFA (PROVENTIL;VENTOLIN;PROAIR) 108 (90 Base) MCG/ACT inhaler INHALE 2 PUFFS BY MOUTH 4 TIMES DAILY AS NEEDED FOR WHEEZING 1 each 2    spironolactone (ALDACTONE) 100 MG tablet Take 1 tablet by mouth 2 times daily 60 tablet 0    levothyroxine (SYNTHROID) 50 MCG tablet Take 1 tablet by mouth daily 90 tablet 0    DULoxetine (CYMBALTA) 60 MG extended release capsule Take 1 capsule by mouth daily 90 capsule 0    XIFAXAN 550 MG tablet Take 1 tablet by mouth twice daily 60 tablet 0    furosemide (LASIX) 40 MG tablet Take 1 tablet by mouth daily 60 tablet 3    lactulose (CHRONULAC) 10 GM/15ML solution Take 30 mLs by mouth 3 times daily Hold if having diarrhea 2700 mL 5    Multiple Vitamin (MULTI VITAMIN MENS PO) Take 1 tablet by mouth daily      ESOMEPRAZOLE MAGNESIUM PO Take 20 mg by mouth daily OTC      Omega-3 1000 MG CAPS Take 2 capsules by mouth daily       No current facility-administered medications on file prior to encounter.       ROS: No fevers or chills    Vital Signs:    Body mass index is 38.01 kg/m².    Laboratory:  Recent Labs     01/18/24  0828   WBC 5.2   INR 1.1     INR @LABR24(INR)@    Physical Exam:  GENERAL:Well developed, well nourished in NAD  NECK: Neck exam - No JVD,HJR or carotid bruit, no thyromegaly   RESPIRATORY:Clear to auscultation  HEART:RRR,no murmer, gallop or friction rub  ABDOMEN: Bowel sounds present, no tenderness to palpation. No organomegaly  EXTREMITIES: no edema or cyanosis  VASCULAR:  no ischemic changes  SKIN: no erythema, rubor or lesions noted  NEURO/PSYCH:Alert and oriented    EKG:    Imaging:    Chest: CTA    Heart: S1, S1    Impression:  Active Problems:    * No active hospital problems. *  Resolved Problems:    * No resolved hospital problems. *      Ascites    Mallampati Score 2  ASA class 2    PLAN OF CARE/PLANNED PROCEDURE    IR US GUIDED PARACENTESIS W IMAGING

## 2024-01-18 NOTE — PROGRESS NOTES
TRANSFER - OUT REPORT:    Verbal report given to Lainey/JANNETH Chawla on Boby Glasgow being transferred to IR holding for routine post-op       Report consisted of patient's Situation, Background, Assessment and   Recommendations(SBAR).     Information from the following report(s) Nurse Handoff Report was reviewed with the receiving nurse.    Opportunity for questions and clarification was provided.      Patient transported with:   Registered Nurse

## 2024-01-19 LAB
CULTURE: ABNORMAL
GRAM SMEAR: ABNORMAL
Lab: ABNORMAL
SPECIMEN: ABNORMAL

## 2024-01-19 NOTE — PROGRESS NOTES
IR Procedure at [facility]: Spoke with patient's wife Gaby and he will arrive at 0800 at [facility] on 1/25/2024 for his procedure at 0930. Below instructions have been given. Patient will take his medications as scheduled.        (Paracentesis, Thoracentesis, Thyroid Bx and Injections may have a light breakfast)  2.   Follow your directions as prescribed by the doctor for your procedure and medications.  3.   Consult your provider as to when to stop blood thinner  4.   Do not take any pain medication within 6 hours of your procedure  5.   Do not drink any alcoholic beverages or use any street drugs 24 hours before procedure.  6.   Please wear simple, loose fitting clothing to the hospital.  Do not bring valuables (money,             credit cards, checkbooks, etc.)     7.   If you  have a Living Will and Durable Power of  for Healthcare, please bring in a copy.  8.   Please bring picture ID,  insurance card, paperwork from the doctors office            (H & P, Consent,  & card for implantable devices).  9.   Report to the information desk on the ground floor.  10. Take a shower the night before or morning of your procedure, do not apply any lotion, oil or powder.  11. If you are going to be sedated for the procedure, you will need a responsible adult to drive you home.

## 2024-01-22 RX ORDER — LACTULOSE 10 G/15ML
20 SOLUTION ORAL 3 TIMES DAILY
Qty: 2700 ML | Refills: 5 | Status: SHIPPED | OUTPATIENT
Start: 2024-01-22

## 2024-01-23 LAB
CULTURE: ABNORMAL
GRAM SMEAR: ABNORMAL
Lab: ABNORMAL
SPECIMEN: ABNORMAL

## 2024-01-24 NOTE — PROGRESS NOTES
IR Procedure at [facility]: Spoke with Gaby, patient's wife and he will arrive at 0800 at [facility] on 2/1/2024 for his procedure at  0930. Also went over below instructions and patient will take his medications as scheduled.       (Paracentesis, Thoracentesis, Thyroid Bx and Injections may have a light breakfast)  2.   Follow your directions as prescribed by the doctor for your procedure and medications.  3.   Consult your provider as to when to stop blood thinner  4.   Do not take any pain medication within 6 hours of your procedure  5.   Do not drink any alcoholic beverages or use any street drugs 24 hours before procedure.  6.   Please wear simple, loose fitting clothing to the hospital.  Do not bring valuables (money,             credit cards, checkbooks, etc.)     7.   If you  have a Living Will and Durable Power of  for Healthcare, please bring in a copy.  8.   Please bring picture ID,  insurance card, paperwork from the doctors office            (H & P, Consent,  & card for implantable devices).  9.   Report to the information desk on the ground floor.  10. Take a shower the night before or morning of your procedure, do not apply any lotion, oil or powder.  11. If you are going to be sedated for the procedure, you will need a responsible adult to drive you home.

## 2024-01-25 ENCOUNTER — HOSPITAL ENCOUNTER (OUTPATIENT)
Dept: INTERVENTIONAL RADIOLOGY/VASCULAR | Age: 79
Discharge: HOME | End: 2024-01-25
Payer: COMMERCIAL

## 2024-01-25 VITALS
OXYGEN SATURATION: 99 % | WEIGHT: 250 LBS | BODY MASS INDEX: 37.89 KG/M2 | SYSTOLIC BLOOD PRESSURE: 150 MMHG | HEART RATE: 82 BPM | DIASTOLIC BLOOD PRESSURE: 82 MMHG | TEMPERATURE: 99.3 F | HEIGHT: 68 IN

## 2024-01-25 DIAGNOSIS — R18.8 OTHER ASCITES: ICD-10-CM

## 2024-01-25 LAB
ALBUMIN FLUID: 0.7 GM/DL
AMYLASE FLUID: 16 U/L
FLUID TYPE: NORMAL INDEX
GLUCOSE, FLUID: 109 MG/DL
LACTATE DEHYDROGENASE, FLUID: 41 IU/L
LYMPHOCYTES, BODY FLUID: NORMAL %
MESOTHELIAL FLUID: NORMAL /100 WBC
MONOCYTE, FLUID: NORMAL %
NEUTROPHIL, FLUID: NORMAL %
OTHER CELLS FLUID: NORMAL
PROTEIN FLUID: 1.4 GM/DL
RBC FLUID: <2000 /CU MM
SOURCE FLUID: NORMAL
WBC FLUID: 129 /CU MM

## 2024-01-25 PROCEDURE — P9047 ALBUMIN (HUMAN), 25%, 50ML: HCPCS | Performed by: RADIOLOGY

## 2024-01-25 PROCEDURE — 87205 SMEAR GRAM STAIN: CPT

## 2024-01-25 PROCEDURE — 82150 ASSAY OF AMYLASE: CPT

## 2024-01-25 PROCEDURE — 6360000002 HC RX W HCPCS: Performed by: RADIOLOGY

## 2024-01-25 PROCEDURE — 84157 ASSAY OF PROTEIN OTHER: CPT

## 2024-01-25 PROCEDURE — 6360000002 HC RX W HCPCS

## 2024-01-25 PROCEDURE — 2709999900 IR US GUIDED PARACENTESIS

## 2024-01-25 PROCEDURE — P9047 ALBUMIN (HUMAN), 25%, 50ML: HCPCS

## 2024-01-25 PROCEDURE — 87070 CULTURE OTHR SPECIMN AEROBIC: CPT

## 2024-01-25 PROCEDURE — 2500000003 HC RX 250 WO HCPCS: Performed by: RADIOLOGY

## 2024-01-25 PROCEDURE — 82042 OTHER SOURCE ALBUMIN QUAN EA: CPT

## 2024-01-25 PROCEDURE — 83615 LACTATE (LD) (LDH) ENZYME: CPT

## 2024-01-25 PROCEDURE — 89051 BODY FLUID CELL COUNT: CPT

## 2024-01-25 PROCEDURE — 49083 ABD PARACENTESIS W/IMAGING: CPT

## 2024-01-25 PROCEDURE — 82945 GLUCOSE OTHER FLUID: CPT

## 2024-01-25 RX ORDER — LIDOCAINE HYDROCHLORIDE 10 MG/ML
INJECTION, SOLUTION EPIDURAL; INFILTRATION; INTRACAUDAL; PERINEURAL PRN
Status: COMPLETED | OUTPATIENT
Start: 2024-01-25 | End: 2024-01-25

## 2024-01-25 RX ORDER — ALBUMIN (HUMAN) 12.5 G/50ML
SOLUTION INTRAVENOUS CONTINUOUS PRN
Status: COMPLETED | OUTPATIENT
Start: 2024-01-25 | End: 2024-01-25

## 2024-01-25 RX ADMIN — LIDOCAINE HYDROCHLORIDE 11 ML: 10 INJECTION, SOLUTION EPIDURAL; INFILTRATION; INTRACAUDAL; PERINEURAL at 10:06

## 2024-01-25 RX ADMIN — ALBUMIN (HUMAN) 50 G: 0.25 INJECTION, SOLUTION INTRAVENOUS at 10:43

## 2024-01-25 ASSESSMENT — PAIN - FUNCTIONAL ASSESSMENT: PAIN_FUNCTIONAL_ASSESSMENT: NONE - DENIES PAIN

## 2024-01-25 NOTE — PROGRESS NOTES
TRANSFER - OUT REPORT:    Verbal report given to Nanda/Natalia RN on Boby Glasgow being transferred to [facility] for routine progression of patient care       Report consisted of patient's Situation, Background, Assessment and   Recommendations(SBAR).     Information from the following report(s) Nurse Handoff Report was reviewed with the receiving nurse.    Opportunity for questions and clarification was provided.      Patient transported with:   Registered Nurse

## 2024-01-25 NOTE — PROGRESS NOTES
Informed Dr Fitch that the fluid removed was cloudy light yellow and a scant amount of blood was present at the end of the procedure.  No new orders.

## 2024-01-25 NOTE — H&P
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  Initially treated with vancomycin but had a supratherapeutic level, hence, it was substituted by daptomycin.  Cumulative 6-week course of therapy will be completed on 3/13/2023.    Anxiety     Chronic heart failure, unspecified heart failure type (HCC) 2023    Chronic heart failure, unspecified heart failure type (HCC) 2023    Cirrhosis, alcoholic (HCC)     Diabetes mellitus (HCC)     GERD (gastroesophageal reflux disease)     GERD (gastroesophageal reflux disease)     Hyperlipidemia     Hypertension     Meniere's disease     Portal hypertension (HCC)     Pulmonary nodules     Secondary esophageal varices without bleeding (HCC)     Sleep apnea     SOB (shortness of breath)     Thyroid disease        Past Surgical History:  Past Surgical History:   Procedure Laterality Date    APPENDECTOMY      CHOLECYSTECTOMY      COLONOSCOPY      ENDOSCOPY, COLON, DIAGNOSTIC      FOOT SURGERY      needle removed,not sure which foot, per wife.    TONSILLECTOMY      UPPER GASTROINTESTINAL ENDOSCOPY N/A 2022    EGD BIOPSY performed by Travis Phelan MD at [facility] ENDOSCOPY    VASECTOMY         Social History:  Social History     Socioeconomic History    Marital status:      Spouse name: Not on file    Number of children: Not on file    Years of education: Not on file    Highest education level: Not on file   Occupational History     Comment: Retired    Tobacco Use    Smoking status: Former     Current packs/day: 0.00     Average packs/day: 1 pack/day for 14.0 years

## 2024-01-25 NOTE — OR NURSING
Pt had a paracentesis by Dr Fitch with  7520cc of fluid removed.  Sent 1,070 cc of cloudy light yellow fluid to lab.

## 2024-01-28 LAB
CULTURE: NORMAL
Lab: NORMAL
SPECIMEN: NORMAL

## 2024-01-29 DIAGNOSIS — R18.8 OTHER ASCITES: Primary | ICD-10-CM

## 2024-01-30 LAB
CULTURE: ABNORMAL
GRAM SMEAR: ABNORMAL
Lab: ABNORMAL
SPECIMEN: ABNORMAL

## 2024-01-30 RX ORDER — FUROSEMIDE 40 MG/1
40 TABLET ORAL DAILY
Qty: 60 TABLET | Refills: 3 | Status: SHIPPED | OUTPATIENT
Start: 2024-01-30

## 2024-02-01 ENCOUNTER — HOSPITAL ENCOUNTER (OUTPATIENT)
Dept: INTERVENTIONAL RADIOLOGY/VASCULAR | Age: 79
Discharge: HOME | End: 2024-02-01
Payer: COMMERCIAL

## 2024-02-01 VITALS
SYSTOLIC BLOOD PRESSURE: 155 MMHG | OXYGEN SATURATION: 100 % | RESPIRATION RATE: 16 BRPM | DIASTOLIC BLOOD PRESSURE: 82 MMHG | HEART RATE: 87 BPM

## 2024-02-01 DIAGNOSIS — R18.8 OTHER ASCITES: ICD-10-CM

## 2024-02-01 LAB
FLUID TYPE: NORMAL INDEX
LYMPHOCYTES, BODY FLUID: NORMAL %
MESOTHELIAL FLUID: NORMAL /100 WBC
MONOCYTE, FLUID: NORMAL %
NEUTROPHIL, FLUID: NORMAL %
OTHER CELLS FLUID: NORMAL
RBC FLUID: 5000 /CU MM
WBC FLUID: 143 /CU MM

## 2024-02-01 PROCEDURE — 2500000003 HC RX 250 WO HCPCS: Performed by: RADIOLOGY

## 2024-02-01 PROCEDURE — 2580000003 HC RX 258

## 2024-02-01 PROCEDURE — 87205 SMEAR GRAM STAIN: CPT

## 2024-02-01 PROCEDURE — 6360000002 HC RX W HCPCS

## 2024-02-01 PROCEDURE — 87070 CULTURE OTHR SPECIMN AEROBIC: CPT

## 2024-02-01 PROCEDURE — 2709999900 IR US GUIDED PARACENTESIS

## 2024-02-01 PROCEDURE — 89051 BODY FLUID CELL COUNT: CPT

## 2024-02-01 PROCEDURE — 49083 ABD PARACENTESIS W/IMAGING: CPT

## 2024-02-01 PROCEDURE — P9047 ALBUMIN (HUMAN), 25%, 50ML: HCPCS | Performed by: RADIOLOGY

## 2024-02-01 PROCEDURE — 6360000002 HC RX W HCPCS: Performed by: RADIOLOGY

## 2024-02-01 PROCEDURE — P9047 ALBUMIN (HUMAN), 25%, 50ML: HCPCS

## 2024-02-01 RX ORDER — LIDOCAINE HYDROCHLORIDE 10 MG/ML
INJECTION, SOLUTION EPIDURAL; INFILTRATION; INTRACAUDAL; PERINEURAL PRN
Status: COMPLETED | OUTPATIENT
Start: 2024-02-01 | End: 2024-02-01

## 2024-02-01 RX ORDER — ALBUMIN (HUMAN) 12.5 G/50ML
SOLUTION INTRAVENOUS CONTINUOUS PRN
Status: COMPLETED | OUTPATIENT
Start: 2024-02-01 | End: 2024-02-01

## 2024-02-01 RX ADMIN — LIDOCAINE HYDROCHLORIDE 5 ML: 10 INJECTION, SOLUTION EPIDURAL; INFILTRATION; INTRACAUDAL; PERINEURAL at 08:48

## 2024-02-01 RX ADMIN — ALBUMIN (HUMAN) 50 G: 0.25 INJECTION, SOLUTION INTRAVENOUS at 09:19

## 2024-02-01 NOTE — PROGRESS NOTES
IR PREPROCEDURE NOTE    2/1/24    PARACENTESIS EXPLAINED TO PT INCLUDING RISKS, BENEFITS AND ALTERNATIVES AND PT ELECTS TO PROCEED WITH PARACENTESIS.    WINSTON LOPEZ DO

## 2024-02-01 NOTE — BRIEF OP NOTE
Brief Postoperative Note      Patient: Boby Glasgow  YOB: 1945  MRN: [id number]    Date of Procedure: 2/1/2024    * No Diagnosis Codes entered *ascites    Post-Op Diagnosis: Same       * No procedures listed *paracentesis    * No surgeons listed *paul madrigal do    Assistant:  * No surgical staff found *    Anesthesia: * No anesthesia type entered *local    Estimated Blood Loss (mL): Minimal    Complications: None    Specimens:   * No specimens in log *    Implants:  * No implants in log *      Drains: * No LDAs found *    Findings: 7500 ml ascitic fluid removed.  No complication suggested.  Specimen to lab      Electronically signed by Paul Madrigal DO on 2/1/2024 at 9:53 AM

## 2024-02-01 NOTE — PROGRESS NOTES
Paracentesis complete. Labs collected as ordered. Albumin replaced as ordered. SBAR with JANNETH Vee. 7937mL

## 2024-02-02 LAB
CULTURE: ABNORMAL
GRAM SMEAR: ABNORMAL
Lab: ABNORMAL
SPECIMEN: ABNORMAL

## 2024-02-05 LAB
CULTURE: ABNORMAL
GRAM SMEAR: ABNORMAL
Lab: ABNORMAL
SPECIMEN: ABNORMAL

## 2024-02-06 NOTE — PROGRESS NOTES
IR Procedure at [facility]:  Spoke with patient's karissa Griffin and patient will arrive at 0800 at [facility] on 2/8/2024 for his procedure at 0930. Also told her patient can take his medications as scheduled. Patient has been given below instructions.   (Paracentesis, Thoracentesis, Thyroid Bx and Injections may have a light breakfast)  2.   Follow your directions as prescribed by the doctor for your procedure and medications.  3.   Consult your provider as to when to stop blood thinner  4.   Do not take any pain medication within 6 hours of your procedure  5.   Do not drink any alcoholic beverages or use any street drugs 24 hours before procedure.  6.   Please wear simple, loose fitting clothing to the hospital.  Do not bring valuables (money,             credit cards, checkbooks, etc.)     7.   If you  have a Living Will and Durable Power of  for Healthcare, please bring in a copy.  8.   Please bring picture ID,  insurance card, paperwork from the doctors office            (H & P, Consent,  & card for implantable devices).  9.   Report to the information desk on the ground floor.  10. Take a shower the night before or morning of your procedure, do not apply any lotion, oil or powder.  11. If you are going to be sedated for the procedure, you will need a responsible adult to drive you home.

## 2024-02-08 ENCOUNTER — HOSPITAL ENCOUNTER (OUTPATIENT)
Dept: INTERVENTIONAL RADIOLOGY/VASCULAR | Age: 79
Discharge: HOME | End: 2024-02-08
Payer: COMMERCIAL

## 2024-02-08 VITALS
SYSTOLIC BLOOD PRESSURE: 124 MMHG | HEART RATE: 88 BPM | OXYGEN SATURATION: 98 % | DIASTOLIC BLOOD PRESSURE: 70 MMHG | TEMPERATURE: 98.4 F

## 2024-02-08 DIAGNOSIS — R18.8 OTHER ASCITES: ICD-10-CM

## 2024-02-08 LAB
FLUID TYPE: NORMAL INDEX
LYMPHOCYTES, BODY FLUID: 15 %
MESOTHELIAL FLUID: 2 /100 WBC
MONOCYTE, FLUID: 84 %
NEUTROPHIL, FLUID: 1 %
RBC FLUID: <2000 /CU MM
WBC FLUID: 143 /CU MM

## 2024-02-08 PROCEDURE — 87205 SMEAR GRAM STAIN: CPT

## 2024-02-08 PROCEDURE — 87075 CULTR BACTERIA EXCEPT BLOOD: CPT

## 2024-02-08 PROCEDURE — 2500000003 HC RX 250 WO HCPCS: Performed by: RADIOLOGY

## 2024-02-08 PROCEDURE — 2709999900 IR US GUIDED PARACENTESIS

## 2024-02-08 PROCEDURE — 89051 BODY FLUID CELL COUNT: CPT

## 2024-02-08 PROCEDURE — 6360000002 HC RX W HCPCS: Performed by: SPECIALIST

## 2024-02-08 PROCEDURE — 6360000002 HC RX W HCPCS

## 2024-02-08 PROCEDURE — P9047 ALBUMIN (HUMAN), 25%, 50ML: HCPCS | Performed by: SPECIALIST

## 2024-02-08 PROCEDURE — 2580000003 HC RX 258

## 2024-02-08 PROCEDURE — P9047 ALBUMIN (HUMAN), 25%, 50ML: HCPCS

## 2024-02-08 PROCEDURE — 49083 ABD PARACENTESIS W/IMAGING: CPT

## 2024-02-08 PROCEDURE — 87070 CULTURE OTHR SPECIMN AEROBIC: CPT

## 2024-02-08 RX ORDER — LIDOCAINE HYDROCHLORIDE 10 MG/ML
INJECTION, SOLUTION EPIDURAL; INFILTRATION; INTRACAUDAL; PERINEURAL PRN
Status: COMPLETED | OUTPATIENT
Start: 2024-02-08 | End: 2024-02-08

## 2024-02-08 RX ORDER — RIFAXIMIN 550 MG/1
550 TABLET ORAL 2 TIMES DAILY
Qty: 60 TABLET | Refills: 0 | Status: SHIPPED | OUTPATIENT
Start: 2024-02-08

## 2024-02-08 RX ORDER — ALBUMIN (HUMAN) 12.5 G/50ML
SOLUTION INTRAVENOUS CONTINUOUS PRN
Status: COMPLETED | OUTPATIENT
Start: 2024-02-08 | End: 2024-02-08

## 2024-02-08 RX ADMIN — ALBUMIN (HUMAN) 50 G: 0.25 INJECTION, SOLUTION INTRAVENOUS at 10:05

## 2024-02-08 RX ADMIN — LIDOCAINE HYDROCHLORIDE 17 ML: 10 INJECTION, SOLUTION EPIDURAL; INFILTRATION; INTRACAUDAL; PERINEURAL at 09:20

## 2024-02-08 NOTE — OR NURSING
Pt had paracentesis performed by Dr Patel with total 7880cc of cloudy yellow fluid removed  1080cc of cloudy fluid sent to lab

## 2024-02-08 NOTE — PROGRESS NOTES
PT ARRIVED TO [facility] FROM HIS PROCEDURE, DRESSING WNL, VSS, ALBUMIN INFUSING, PT RESTING IN BED WITH CALL LIGHT IN REACH.

## 2024-02-09 NOTE — PROGRESS NOTES
IR Procedure at [facility]:  Spoke with patient's  wife Gaby and patient will arrive at 1130 at [facility] on 2/15/2024 for his procedure at 1300. He will take his medications as scheduled. Patient has been given below instructions.       (Paracentesis, Thoracentesis, Thyroid Bx and Injections may have a light breakfast)  2.   Follow your directions as prescribed by the doctor for your procedure and medications.  3.   Consult your provider as to when to stop blood thinner  4.   Do not take any pain medication within 6 hours of your procedure  5.   Do not drink any alcoholic beverages or use any street drugs 24 hours before procedure.  6.   Please wear simple, loose fitting clothing to the hospital.  Do not bring valuables (money,             credit cards, checkbooks, etc.)     7.   If you  have a Living Will and Durable Power of  for Healthcare, please bring in a copy.  8.   Please bring picture ID,  insurance card, paperwork from the doctors office            (H & P, Consent,  & card for implantable devices).  9.   Report to the information desk on the ground floor.  10. Take a shower the night before or morning of your procedure, do not apply any lotion, oil or powder.  11. If you are going to be sedated for the procedure, you will need a responsible adult to drive you home.

## 2024-02-10 LAB
CULTURE: NORMAL
Lab: NORMAL
SPECIMEN: NORMAL

## 2024-02-11 LAB
CULTURE: ABNORMAL
GRAM SMEAR: ABNORMAL
Lab: ABNORMAL
SPECIMEN: ABNORMAL

## 2024-02-13 LAB
CULTURE: NORMAL
Lab: NORMAL
SPECIMEN: NORMAL

## 2024-02-14 NOTE — PROGRESS NOTES
IR Procedure at [facility]:  Left message for patient to arrive at 0800 at [facility] on 2/22/2024 for his procedure at 0930. Patient has been given  below instructions and was told to take his medications as scheduled.       (Paracentesis, Thoracentesis, Thyroid Bx and Injections may have a light breakfast)  2.   Follow your directions as prescribed by the doctor for your procedure and medications.  3.   Consult your provider as to when to stop blood thinner  4.   Do not take any pain medication within 6 hours of your procedure  5.   Do not drink any alcoholic beverages or use any street drugs 24 hours before procedure.  6.   Please wear simple, loose fitting clothing to the hospital.  Do not bring valuables (money,             credit cards, checkbooks, etc.)     7.   If you  have a Living Will and Durable Power of  for Healthcare, please bring in a copy.  8.   Please bring picture ID,  insurance card, paperwork from the doctors office            (H & P, Consent,  & card for implantable devices).  9.   Report to the information desk on the ground floor.  10. Take a shower the night before or morning of your procedure, do not apply any lotion, oil or powder.  11. If you are going to be sedated for the procedure, you will need a responsible adult to drive you home.

## 2024-02-15 ENCOUNTER — HOSPITAL ENCOUNTER (OUTPATIENT)
Dept: INTERVENTIONAL RADIOLOGY/VASCULAR | Age: 79
Discharge: HOME | End: 2024-02-15
Payer: COMMERCIAL

## 2024-02-15 VITALS
BODY MASS INDEX: 37.89 KG/M2 | TEMPERATURE: 97.6 F | OXYGEN SATURATION: 98 % | HEART RATE: 80 BPM | DIASTOLIC BLOOD PRESSURE: 64 MMHG | WEIGHT: 250 LBS | RESPIRATION RATE: 16 BRPM | SYSTOLIC BLOOD PRESSURE: 143 MMHG | HEIGHT: 68 IN

## 2024-02-15 DIAGNOSIS — R18.8 OTHER ASCITES: ICD-10-CM

## 2024-02-15 PROCEDURE — 2500000003 HC RX 250 WO HCPCS: Performed by: RADIOLOGY

## 2024-02-15 PROCEDURE — P9047 ALBUMIN (HUMAN), 25%, 50ML: HCPCS | Performed by: RADIOLOGY

## 2024-02-15 PROCEDURE — 6360000002 HC RX W HCPCS

## 2024-02-15 PROCEDURE — C1729 CATH, DRAINAGE: HCPCS

## 2024-02-15 PROCEDURE — 6360000002 HC RX W HCPCS: Performed by: RADIOLOGY

## 2024-02-15 PROCEDURE — P9047 ALBUMIN (HUMAN), 25%, 50ML: HCPCS

## 2024-02-15 PROCEDURE — 2580000003 HC RX 258

## 2024-02-15 PROCEDURE — 49083 ABD PARACENTESIS W/IMAGING: CPT

## 2024-02-15 RX ORDER — ALBUMIN (HUMAN) 12.5 G/50ML
SOLUTION INTRAVENOUS CONTINUOUS PRN
Status: COMPLETED | OUTPATIENT
Start: 2024-02-15 | End: 2024-02-15

## 2024-02-15 RX ORDER — SODIUM CHLORIDE 0.9 % (FLUSH) 0.9 %
10 SYRINGE (ML) INJECTION PRN
Status: DISCONTINUED | OUTPATIENT
Start: 2024-02-15 | End: 2024-02-16 | Stop reason: HOSPADM

## 2024-02-15 RX ORDER — LIDOCAINE HYDROCHLORIDE 10 MG/ML
INJECTION, SOLUTION EPIDURAL; INFILTRATION; INTRACAUDAL; PERINEURAL PRN
Status: COMPLETED | OUTPATIENT
Start: 2024-02-15 | End: 2024-02-15

## 2024-02-15 RX ADMIN — ALBUMIN (HUMAN) 50 G: 0.25 INJECTION, SOLUTION INTRAVENOUS at 13:29

## 2024-02-15 RX ADMIN — LIDOCAINE HYDROCHLORIDE 5 ML: 10 INJECTION, SOLUTION EPIDURAL; INFILTRATION; INTRACAUDAL; PERINEURAL at 12:58

## 2024-02-15 NOTE — PROGRESS NOTES
Pt arrived to [facility] from his procedure, dressing wnl, albumin infusing  Pt resting in bed with call light in reach drinking orange juice

## 2024-02-21 NOTE — PROGRESS NOTES
IR Procedure at [facility]:  Spoke with patient's wife Gaby and he will arrive at 0800 at [facility] on 2/29/2024 for his procedure at 0930. Patient has been given below instructions and he has been told to take his medications as scheduled.        (Paracentesis, Thoracentesis, Thyroid Bx and Injections may have a light breakfast)  2.   Follow your directions as prescribed by the doctor for your procedure and medications.  3.   Consult your provider as to when to stop blood thinner  4.   Do not take any pain medication within 6 hours of your procedure  5.   Do not drink any alcoholic beverages or use any street drugs 24 hours before procedure.  6.   Please wear simple, loose fitting clothing to the hospital.  Do not bring valuables (money,             credit cards, checkbooks, etc.)     7.   If you  have a Living Will and Durable Power of  for Healthcare, please bring in a copy.  8.   Please bring picture ID,  insurance card, paperwork from the doctors office            (H & P, Consent,  & card for implantable devices).  9.   Report to the information desk on the ground floor.  10. Take a shower the night before or morning of your procedure, do not apply any lotion, oil or powder.  11. If you are going to be sedated for the procedure, you will need a responsible adult to drive you home.

## 2024-02-22 ENCOUNTER — HOSPITAL ENCOUNTER (OUTPATIENT)
Dept: INTERVENTIONAL RADIOLOGY/VASCULAR | Age: 79
Discharge: HOME | End: 2024-02-22
Payer: COMMERCIAL

## 2024-02-22 VITALS
RESPIRATION RATE: 16 BRPM | HEART RATE: 81 BPM | SYSTOLIC BLOOD PRESSURE: 147 MMHG | TEMPERATURE: 98.6 F | DIASTOLIC BLOOD PRESSURE: 80 MMHG | OXYGEN SATURATION: 96 %

## 2024-02-22 DIAGNOSIS — R18.8 OTHER ASCITES: ICD-10-CM

## 2024-02-22 LAB
APTT: 29.3 SECONDS (ref 25.1–37.1)
FLUID TYPE: NORMAL INDEX
HCT VFR BLD CALC: 28.2 % (ref 42–52)
HEMOGLOBIN: 8.7 GM/DL (ref 13.5–18)
INR BLD: 1.2 INDEX
LYMPHOCYTES, BODY FLUID: NORMAL %
MCH RBC QN AUTO: 27.9 PG (ref 27–31)
MCHC RBC AUTO-ENTMCNC: 30.9 % (ref 32–36)
MCV RBC AUTO: 90.4 FL (ref 78–100)
MONOCYTE, FLUID: NORMAL %
NEUTROPHIL, FLUID: NORMAL %
OTHER CELLS FLUID: NORMAL
PDW BLD-RTO: 15.8 % (ref 11.7–14.9)
PLATELET # BLD: 218 K/CU MM (ref 140–440)
PMV BLD AUTO: 9.7 FL (ref 7.5–11.1)
PROTHROMBIN TIME: 15.1 SECONDS (ref 11.7–14.5)
RBC # BLD: 3.12 M/CU MM (ref 4.6–6.2)
RBC FLUID: <2000 /CU MM
WBC # BLD: 5.8 K/CU MM (ref 4–10.5)
WBC FLUID: 45 /CU MM

## 2024-02-22 PROCEDURE — 87070 CULTURE OTHR SPECIMN AEROBIC: CPT

## 2024-02-22 PROCEDURE — 89051 BODY FLUID CELL COUNT: CPT

## 2024-02-22 PROCEDURE — 49083 ABD PARACENTESIS W/IMAGING: CPT

## 2024-02-22 PROCEDURE — 87205 SMEAR GRAM STAIN: CPT

## 2024-02-22 PROCEDURE — 2500000003 HC RX 250 WO HCPCS: Performed by: RADIOLOGY

## 2024-02-22 PROCEDURE — 85610 PROTHROMBIN TIME: CPT

## 2024-02-22 PROCEDURE — 6360000002 HC RX W HCPCS

## 2024-02-22 PROCEDURE — P9047 ALBUMIN (HUMAN), 25%, 50ML: HCPCS

## 2024-02-22 PROCEDURE — 85730 THROMBOPLASTIN TIME PARTIAL: CPT

## 2024-02-22 PROCEDURE — 85027 COMPLETE CBC AUTOMATED: CPT

## 2024-02-22 PROCEDURE — C1729 CATH, DRAINAGE: HCPCS

## 2024-02-22 RX ORDER — LIDOCAINE HYDROCHLORIDE 10 MG/ML
INJECTION, SOLUTION EPIDURAL; INFILTRATION; INTRACAUDAL; PERINEURAL PRN
Status: COMPLETED | OUTPATIENT
Start: 2024-02-22 | End: 2024-02-22

## 2024-02-22 RX ADMIN — LIDOCAINE HYDROCHLORIDE 5 ML: 10 INJECTION, SOLUTION EPIDURAL; INFILTRATION; INTRACAUDAL; PERINEURAL at 09:33

## 2024-02-22 NOTE — PROGRESS NOTES
Paracentesis complete. 3500 mL drained total. Fluid sent to lab as ordered. SBAR with JANNETH Mcmahan

## 2024-02-22 NOTE — PROGRESS NOTES
Pt. tolerated paracentesis procedure well. Vitals stable. Site is clean, dry, dressing intact. Pt. Denies pain or needs. Discharged home at this time.

## 2024-02-22 NOTE — H&P
2  ASA class 2    PLAN OF CARE/PLANNED PROCEDURE    IR US GUIDED PARACENTESIS W IMAGING [82235]

## 2024-02-25 LAB
CULTURE: ABNORMAL
GRAM SMEAR: ABNORMAL
Lab: ABNORMAL
SPECIMEN: ABNORMAL

## 2024-02-29 ENCOUNTER — HOSPITAL ENCOUNTER (OUTPATIENT)
Dept: INTERVENTIONAL RADIOLOGY/VASCULAR | Age: 79
Discharge: HOME | End: 2024-02-29
Payer: COMMERCIAL

## 2024-02-29 VITALS — SYSTOLIC BLOOD PRESSURE: 150 MMHG | DIASTOLIC BLOOD PRESSURE: 75 MMHG | HEART RATE: 106 BPM | OXYGEN SATURATION: 96 %

## 2024-02-29 DIAGNOSIS — R18.8 OTHER ASCITES: ICD-10-CM

## 2024-02-29 LAB
FLUID TYPE: NORMAL INDEX
GLUCOSE, FLUID: 112 MG/DL
LACTATE DEHYDROGENASE, FLUID: 43 IU/L
LYMPHOCYTES, BODY FLUID: 28 %
MESOTHELIAL FLUID: 11 /100 WBC
MONOCYTE, FLUID: 66 %
NEUTROPHIL, FLUID: 6 %
OTHER CELLS FLUID: 0
PROTEIN FLUID: 1.3 G/DL
RBC FLUID: <2000 /CU MM
WBC FLUID: 97 /CU MM

## 2024-02-29 PROCEDURE — 87205 SMEAR GRAM STAIN: CPT

## 2024-02-29 PROCEDURE — C1729 CATH, DRAINAGE: HCPCS

## 2024-02-29 PROCEDURE — P9047 ALBUMIN (HUMAN), 25%, 50ML: HCPCS | Performed by: RADIOLOGY

## 2024-02-29 PROCEDURE — 87070 CULTURE OTHR SPECIMN AEROBIC: CPT

## 2024-02-29 PROCEDURE — 82945 GLUCOSE OTHER FLUID: CPT

## 2024-02-29 PROCEDURE — 49083 ABD PARACENTESIS W/IMAGING: CPT

## 2024-02-29 PROCEDURE — 84157 ASSAY OF PROTEIN OTHER: CPT

## 2024-02-29 PROCEDURE — 89051 BODY FLUID CELL COUNT: CPT

## 2024-02-29 PROCEDURE — 6360000002 HC RX W HCPCS

## 2024-02-29 PROCEDURE — 83615 LACTATE (LD) (LDH) ENZYME: CPT

## 2024-02-29 PROCEDURE — P9047 ALBUMIN (HUMAN), 25%, 50ML: HCPCS

## 2024-02-29 PROCEDURE — 2500000003 HC RX 250 WO HCPCS: Performed by: RADIOLOGY

## 2024-02-29 PROCEDURE — 6360000002 HC RX W HCPCS: Performed by: RADIOLOGY

## 2024-02-29 RX ORDER — ALBUMIN (HUMAN) 12.5 G/50ML
SOLUTION INTRAVENOUS CONTINUOUS PRN
Status: COMPLETED | OUTPATIENT
Start: 2024-02-29 | End: 2024-02-29

## 2024-02-29 RX ORDER — LIDOCAINE HYDROCHLORIDE 10 MG/ML
INJECTION, SOLUTION EPIDURAL; INFILTRATION; INTRACAUDAL; PERINEURAL PRN
Status: COMPLETED | OUTPATIENT
Start: 2024-02-29 | End: 2024-02-29

## 2024-02-29 RX ADMIN — ALBUMIN (HUMAN) 50 G: 0.25 INJECTION, SOLUTION INTRAVENOUS at 09:12

## 2024-02-29 RX ADMIN — LIDOCAINE HYDROCHLORIDE 8 ML: 10 INJECTION, SOLUTION EPIDURAL; INFILTRATION; INTRACAUDAL; PERINEURAL at 08:59

## 2024-02-29 NOTE — PROGRESS NOTES
Pt. returned to [facility] after procedure in IR. Tolerated well. Drink provided. Preparing for discharge.

## 2024-02-29 NOTE — BRIEF OP NOTE
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  Dressing applied.  No complication suggested.  Specimen sent to lab      Electronically signed by Paul Madrigal DO on 2/29/2024 at 1:18 PM

## 2024-02-29 NOTE — PROGRESS NOTES
IR PREPROCEDURE NOTE    US GUIDED PARACENTESIS EXPLAINED TO PT INCLUDING RISKS, BENEFITS AND ALTERNATIVES AND PT ELECTS TO PROCEED WITH PARACENTESIS.    WINSTON LOPEZ DO

## 2024-02-29 NOTE — PROGRESS NOTES
TRANSFER - OUT REPORT:    Verbal report given to JANNETH Chapman on Boby Glasgow being transferred to [facility] for routine post-op.  Paracentesis performed by Dr. Madrigal.  8800cc cloudy yellow fluid, sent to lab.  Albumin administered.     Report consisted of patient's Situation, Background, Assessment and   Recommendations(SBAR).     Information from the following report(s) Nurse Handoff Report was reviewed with the receiving nurse.    Opportunity for questions and clarification was provided.      Patient transported with:   Registered Nurse

## 2024-03-03 LAB
CULTURE: ABNORMAL
GRAM SMEAR: ABNORMAL
Lab: ABNORMAL
SPECIMEN: ABNORMAL

## 2024-03-04 NOTE — PROGRESS NOTES
IR Procedure at [facility]: Unable to leave a voicemail at this time. Voicemail is full, will try again later.       (Paracentesis, Thoracentesis, Thyroid Bx and Injections may have a light breakfast)  2.   Follow your directions as prescribed by the doctor for your procedure and medications.  3.   Consult your provider as to when to stop blood thinner  4.   Do not take any pain medication within 6 hours of your procedure  5.   Do not drink any alcoholic beverages or use any street drugs 24 hours before procedure.  6.   Please wear simple, loose fitting clothing to the hospital.  Do not bring valuables (money,             credit cards, checkbooks, etc.)     7.   If you  have a Living Will and Durable Power of  for Healthcare, please bring in a copy.  8.   Please bring picture ID,  insurance card, paperwork from the doctors office            (H & P, Consent,  & card for implantable devices).  9.   Report to the information desk on the ground floor.  10. Take a shower the night before or morning of your procedure, do not apply any lotion, oil or powder.  11. If you are going to be sedated for the procedure, you will need a responsible adult to drive you home.

## 2024-03-06 NOTE — PROGRESS NOTES
IR Procedure at [facility]:  Spoke with patient's wife Gaby and patient will arrive at 1230 at [facility] on 3/8/2024 for his procedure at 1400. Below instructions have been given and patient will take his medications as scheduled.        (Paracentesis, Thoracentesis, Thyroid Bx and Injections may have a light breakfast)  2.   Follow your directions as prescribed by the doctor for your procedure and medications.  3.   Consult your provider as to when to stop blood thinner  4.   Do not take any pain medication within 6 hours of your procedure  5.   Do not drink any alcoholic beverages or use any street drugs 24 hours before procedure.  6.   Please wear simple, loose fitting clothing to the hospital.  Do not bring valuables (money,             credit cards, checkbooks, etc.)     7.   If you  have a Living Will and Durable Power of  for Healthcare, please bring in a copy.  8.   Please bring picture ID,  insurance card, paperwork from the doctors office            (H & P, Consent,  & card for implantable devices).  9.   Report to the information desk on the ground floor.  10. Take a shower the night before or morning of your procedure, do not apply any lotion, oil or powder.  11. If you are going to be sedated for the procedure, you will need a responsible adult to drive you home.

## 2024-03-07 NOTE — PROGRESS NOTES
IR Procedure at [facility]: Mailbox is full, I will try again later.      (Paracentesis, Thoracentesis, Thyroid Bx and Injections may have a light breakfast)  2.   Follow your directions as prescribed by the doctor for your procedure and medications.  3.   Consult your provider as to when to stop blood thinner  4.   Do not take any pain medication within 6 hours of your procedure  5.   Do not drink any alcoholic beverages or use any street drugs 24 hours before procedure.  6.   Please wear simple, loose fitting clothing to the hospital.  Do not bring valuables (money,             credit cards, checkbooks, etc.)     7.   If you  have a Living Will and Durable Power of  for Healthcare, please bring in a copy.  8.   Please bring picture ID,  insurance card, paperwork from the doctors office            (H & P, Consent,  & card for implantable devices).  9.   Report to the information desk on the ground floor.  10. Take a shower the night before or morning of your procedure, do not apply any lotion, oil or powder.  11. If you are going to be sedated for the procedure, you will need a responsible adult to drive you home.

## 2024-03-08 ENCOUNTER — HOSPITAL ENCOUNTER (OUTPATIENT)
Dept: INTERVENTIONAL RADIOLOGY/VASCULAR | Age: 79
Discharge: HOME | End: 2024-03-08
Payer: MEDICARE

## 2024-03-08 VITALS
DIASTOLIC BLOOD PRESSURE: 96 MMHG | OXYGEN SATURATION: 99 % | SYSTOLIC BLOOD PRESSURE: 147 MMHG | HEART RATE: 100 BPM | RESPIRATION RATE: 16 BRPM

## 2024-03-08 DIAGNOSIS — R18.8 OTHER ASCITES: ICD-10-CM

## 2024-03-08 LAB
FLUID TYPE: NORMAL INDEX
LYMPHOCYTES, BODY FLUID: 76 %
MESOTHELIAL FLUID: 0 /100 WBC
MONOCYTE, FLUID: 22 %
NEUTROPHIL, FLUID: 2 %
OTHER CELLS FLUID: 0
RBC FLUID: 3000 /CU MM
WBC FLUID: 99 /CU MM

## 2024-03-08 PROCEDURE — 87070 CULTURE OTHR SPECIMN AEROBIC: CPT

## 2024-03-08 PROCEDURE — P9047 ALBUMIN (HUMAN), 25%, 50ML: HCPCS | Performed by: RADIOLOGY

## 2024-03-08 PROCEDURE — 87205 SMEAR GRAM STAIN: CPT

## 2024-03-08 PROCEDURE — 2500000003 HC RX 250 WO HCPCS: Performed by: RADIOLOGY

## 2024-03-08 PROCEDURE — 2709999900 IR US GUIDED PARACENTESIS

## 2024-03-08 PROCEDURE — 6360000002 HC RX W HCPCS: Performed by: RADIOLOGY

## 2024-03-08 PROCEDURE — 89051 BODY FLUID CELL COUNT: CPT

## 2024-03-08 PROCEDURE — 49083 ABD PARACENTESIS W/IMAGING: CPT

## 2024-03-08 RX ORDER — ALBUMIN (HUMAN) 12.5 G/50ML
SOLUTION INTRAVENOUS CONTINUOUS PRN
Status: COMPLETED | OUTPATIENT
Start: 2024-03-08 | End: 2024-03-08

## 2024-03-08 RX ORDER — SODIUM CHLORIDE 0.9 % (FLUSH) 0.9 %
10 SYRINGE (ML) INJECTION PRN
Status: DISCONTINUED | OUTPATIENT
Start: 2024-03-08 | End: 2024-03-09 | Stop reason: HOSPADM

## 2024-03-08 RX ORDER — LIDOCAINE HYDROCHLORIDE 10 MG/ML
INJECTION, SOLUTION EPIDURAL; INFILTRATION; INTRACAUDAL; PERINEURAL PRN
Status: COMPLETED | OUTPATIENT
Start: 2024-03-08 | End: 2024-03-08

## 2024-03-08 RX ADMIN — ALBUMIN (HUMAN) 50 G: 0.25 INJECTION, SOLUTION INTRAVENOUS at 14:07

## 2024-03-08 RX ADMIN — LIDOCAINE HYDROCHLORIDE 10 ML: 10 INJECTION, SOLUTION EPIDURAL; INFILTRATION; INTRACAUDAL; PERINEURAL at 13:18

## 2024-03-08 RX ADMIN — ALBUMIN (HUMAN) 12.5 G: 0.25 INJECTION, SOLUTION INTRAVENOUS at 14:12

## 2024-03-08 NOTE — OR NURSING
Pt had paracentesis performed by Dr Fitch with 600 cc of cloudy yellow pinkish tinged fluid sent to the lab    A total of 9350 cc of cloudy yellow fluid was removed.    Albumin was given during the procedure

## 2024-03-08 NOTE — PROGRESS NOTES
TRANSFER - OUT REPORT:    Verbal report given to Nanda RN/ Aleksander RN on Boby Glasgow being transferred to [facility] for routine progression of patient care       Report consisted of patient's Situation, Background, Assessment and   Recommendations(SBAR).     Information from the following report(s) Nurse Handoff Report was reviewed with the receiving nurse.    Opportunity for questions and clarification was provided.      Patient transported with:   Registered Nurse

## 2024-03-10 LAB
CULTURE: NORMAL
Lab: NORMAL
SPECIMEN: NORMAL

## 2024-03-11 NOTE — PROGRESS NOTES
Second attempt  to contact patient for PAT assessment, arrival times, no answer, VM full, unable to leave message

## 2024-03-13 ENCOUNTER — HOSPITAL ENCOUNTER (OUTPATIENT)
Dept: INTERVENTIONAL RADIOLOGY/VASCULAR | Age: 79
Discharge: HOME | End: 2024-03-13
Payer: COMMERCIAL

## 2024-03-13 VITALS
HEART RATE: 82 BPM | OXYGEN SATURATION: 98 % | HEIGHT: 68 IN | DIASTOLIC BLOOD PRESSURE: 74 MMHG | SYSTOLIC BLOOD PRESSURE: 135 MMHG | WEIGHT: 250 LBS | BODY MASS INDEX: 37.89 KG/M2

## 2024-03-13 DIAGNOSIS — R18.8 OTHER ASCITES: ICD-10-CM

## 2024-03-13 LAB
CULTURE: ABNORMAL
FLUID TYPE: NORMAL INDEX
GRAM SMEAR: ABNORMAL
LYMPHOCYTES, BODY FLUID: 81 %
Lab: ABNORMAL
MESOTHELIAL FLUID: 12 /100 WBC
MONOCYTE, FLUID: 18 %
NEUTROPHIL, FLUID: 1 %
RBC FLUID: <2000 /CU MM
SPECIMEN: ABNORMAL
WBC FLUID: 75 /CU MM

## 2024-03-13 PROCEDURE — 87205 SMEAR GRAM STAIN: CPT

## 2024-03-13 PROCEDURE — 89051 BODY FLUID CELL COUNT: CPT

## 2024-03-13 PROCEDURE — 6360000002 HC RX W HCPCS

## 2024-03-13 PROCEDURE — 49083 ABD PARACENTESIS W/IMAGING: CPT

## 2024-03-13 PROCEDURE — P9047 ALBUMIN (HUMAN), 25%, 50ML: HCPCS

## 2024-03-13 PROCEDURE — 6360000002 HC RX W HCPCS: Performed by: RADIOLOGY

## 2024-03-13 PROCEDURE — C1729 CATH, DRAINAGE: HCPCS

## 2024-03-13 PROCEDURE — 2500000003 HC RX 250 WO HCPCS: Performed by: RADIOLOGY

## 2024-03-13 PROCEDURE — P9047 ALBUMIN (HUMAN), 25%, 50ML: HCPCS | Performed by: RADIOLOGY

## 2024-03-13 PROCEDURE — 87070 CULTURE OTHR SPECIMN AEROBIC: CPT

## 2024-03-13 RX ORDER — ALBUMIN (HUMAN) 12.5 G/50ML
SOLUTION INTRAVENOUS CONTINUOUS PRN
Status: COMPLETED | OUTPATIENT
Start: 2024-03-13 | End: 2024-03-13

## 2024-03-13 RX ORDER — LIDOCAINE HYDROCHLORIDE 10 MG/ML
INJECTION, SOLUTION EPIDURAL; INFILTRATION; INTRACAUDAL; PERINEURAL PRN
Status: COMPLETED | OUTPATIENT
Start: 2024-03-13 | End: 2024-03-13

## 2024-03-13 RX ORDER — SODIUM CHLORIDE 0.9 % (FLUSH) 0.9 %
10 SYRINGE (ML) INJECTION PRN
Status: DISCONTINUED | OUTPATIENT
Start: 2024-03-13 | End: 2024-03-14 | Stop reason: HOSPADM

## 2024-03-13 RX ADMIN — LIDOCAINE HYDROCHLORIDE 10 ML: 10 INJECTION, SOLUTION EPIDURAL; INFILTRATION; INTRACAUDAL; PERINEURAL at 13:45

## 2024-03-13 RX ADMIN — ALBUMIN (HUMAN) 37.5 G: 0.25 INJECTION, SOLUTION INTRAVENOUS at 14:11

## 2024-03-13 ASSESSMENT — PAIN - FUNCTIONAL ASSESSMENT: PAIN_FUNCTIONAL_ASSESSMENT: NONE - DENIES PAIN

## 2024-03-13 NOTE — OR NURSING
PT HAD PARACENTESIS PERFORMED BY DR LOPEZ  1050 CC OF CLOUDY YELLOW FLUID SENT TO LAB    TOTAL OF 6650CC OF CLOUDY YELLOW FLUID REMOVED

## 2024-03-13 NOTE — PROGRESS NOTES
IR PREPROCEDUURE NOTE    3/13/24    US GUIDED PARACENTESIS EXPLAINED TO PT INCLUDING RISKS, BENEFITS AND ALTERNATIVES AND PT ELECTS TO PROCEED WITH PARACENTESIS.    WINSTON LOPEZ DO

## 2024-03-13 NOTE — PROGRESS NOTES
TRANSFER - OUT REPORT:    Verbal report given to layne/adrianne rn on Boby Glasgow being transferred to [facility] for routine progression of patient care       Report consisted of patient's Situation, Background, Assessment and   Recommendations(SBAR).     Information from the following report(s) Nurse Handoff Report was reviewed with the receiving nurse.    Opportunity for questions and clarification was provided.      Patient transported with:   Registered Nurse

## 2024-03-14 RX ORDER — RIFAXIMIN 550 MG/1
550 TABLET ORAL 2 TIMES DAILY
Qty: 60 TABLET | Refills: 0 | Status: SHIPPED | OUTPATIENT
Start: 2024-03-14

## 2024-03-14 NOTE — PROGRESS NOTES
3/14/24 -  with my call-back #, times and instructions    IR Procedure at [facility]:  3/19/24 @ 1430, arrival 1300    NPO at Midnight     (Paracentesis, Thoracentesis, Thyroid Bx and Injections may have a light breakfast)  2.   Follow your directions as prescribed by the doctor for your procedure and medications.  3.   Consult your provider as to when to stop blood thinner  4.   Do not take any pain medication within 6 hours of your procedure  5.   Do not drink any alcoholic beverages or use any street drugs 24 hours before procedure.  6.   Please wear simple, loose fitting clothing to the hospital.  Do not bring valuables (money,             credit cards, checkbooks, etc.)     7.   If you  have a Living Will and Durable Power of  for Healthcare, please bring in a copy.  8.   Please bring picture ID,  insurance card, paperwork from the doctors office            (H & P, Consent,  & card for implantable devices).  9.   Report to the information desk on the ground floor.  10. Take a shower the night before or morning of your procedure, do not apply any lotion, oil or powder.  11. If you are going to be sedated for the procedure, you will need a responsible adult to drive you home.

## 2024-03-15 LAB
CULTURE: ABNORMAL
GRAM SMEAR: ABNORMAL
Lab: ABNORMAL
SPECIMEN: ABNORMAL

## 2024-03-18 LAB
CULTURE: ABNORMAL
GRAM SMEAR: ABNORMAL
Lab: ABNORMAL
SPECIMEN: ABNORMAL

## 2024-03-18 RX ORDER — SODIUM CHLORIDE 0.9 % (FLUSH) 0.9 %
10 SYRINGE (ML) INJECTION PRN
OUTPATIENT
Start: 2024-03-18

## 2024-03-19 ENCOUNTER — HOSPITAL ENCOUNTER (OUTPATIENT)
Dept: INTERVENTIONAL RADIOLOGY/VASCULAR | Age: 79
Discharge: HOME | End: 2024-03-19

## 2024-03-19 NOTE — PROGRESS NOTES
IR Procedure at [facility]: Left message on patient's wife Gaby's phone for patient to arrive at 1300 at [facility] on 3/26/2024 for his procedure at 1430. Below instructions have been given and patient knows to take his medications as scheduled.       (Paracentesis, Thoracentesis, Thyroid Bx and Injections may have a light breakfast)  2.   Follow your directions as prescribed by the doctor for your procedure and medications.  3.   Consult your provider as to when to stop blood thinner  4.   Do not take any pain medication within 6 hours of your procedure  5.   Do not drink any alcoholic beverages or use any street drugs 24 hours before procedure.  6.   Please wear simple, loose fitting clothing to the hospital.  Do not bring valuables (money,             credit cards, checkbooks, etc.)     7.   If you  have a Living Will and Durable Power of  for Healthcare, please bring in a copy.  8.   Please bring picture ID,  insurance card, paperwork from the doctors office            (H & P, Consent,  & card for implantable devices).  9.   Report to the information desk on the ground floor.  10. Take a shower the night before or morning of your procedure, do not apply any lotion, oil or powder.  11. If you are going to be sedated for the procedure, you will need a responsible adult to drive you home.

## 2024-03-21 NOTE — PROGRESS NOTES
IR Procedure at [facility]:  Left message for patient to arrive at 1300 on 3/25/2024 for his procedure at 1430. Patient has been given below instructions and will take his medications as scheduled.        (Paracentesis, Thoracentesis, Thyroid Bx and Injections may have a light breakfast)  2.   Follow your directions as prescribed by the doctor for your procedure and medications.  3.   Consult your provider as to when to stop blood thinner  4.   Do not take any pain medication within 6 hours of your procedure  5.   Do not drink any alcoholic beverages or use any street drugs 24 hours before procedure.  6.   Please wear simple, loose fitting clothing to the hospital.  Do not bring valuables (money,             credit cards, checkbooks, etc.)     7.   If you  have a Living Will and Durable Power of  for Healthcare, please bring in a copy.  8.   Please bring picture ID,  insurance card, paperwork from the doctors office            (H & P, Consent,  & card for implantable devices).  9.   Report to the information desk on the ground floor.  10. Take a shower the night before or morning of your procedure, do not apply any lotion, oil or powder.  11. If you are going to be sedated for the procedure, you will need a responsible adult to drive you home.

## 2024-03-25 ENCOUNTER — HOSPITAL ENCOUNTER (OUTPATIENT)
Dept: INTERVENTIONAL RADIOLOGY/VASCULAR | Age: 79
Discharge: HOME | End: 2024-03-25

## 2024-03-26 ENCOUNTER — HOSPITAL ENCOUNTER (OUTPATIENT)
Dept: INTERVENTIONAL RADIOLOGY/VASCULAR | Age: 79
Discharge: HOME | End: 2024-03-26

## 2024-03-26 NOTE — PROGRESS NOTES
IR Procedure at [facility]: Spoke with patient's wife Gaby and patient will arrive at 0800 at [facility] on 3/27/2024 for his procedure at 0930. Below instructions have been given and patient knows to  take medications as scheduled.        (Paracentesis, Thoracentesis, Thyroid Bx and Injections may have a light breakfast)  2.   Follow your directions as prescribed by the doctor for your procedure and medications.  3.   Consult your provider as to when to stop blood thinner  4.   Do not take any pain medication within 6 hours of your procedure  5.   Do not drink any alcoholic beverages or use any street drugs 24 hours before procedure.  6.   Please wear simple, loose fitting clothing to the hospital.  Do not bring valuables (money,             credit cards, checkbooks, etc.)     7.   If you  have a Living Will and Durable Power of  for Healthcare, please bring in a copy.  8.   Please bring picture ID,  insurance card, paperwork from the doctors office            (H & P, Consent,  & card for implantable devices).  9.   Report to the information desk on the ground floor.  10. Take a shower the night before or morning of your procedure, do not apply any lotion, oil or powder.  11. If you are going to be sedated for the procedure, you will need a responsible adult to drive you home.

## 2024-03-27 ENCOUNTER — TELEPHONE (OUTPATIENT)
Dept: FAMILY MEDICINE CLINIC | Age: 79
End: 2024-03-27

## 2024-03-27 ENCOUNTER — HOSPITAL ENCOUNTER (OUTPATIENT)
Dept: INTERVENTIONAL RADIOLOGY/VASCULAR | Age: 79
Discharge: HOME | End: 2024-03-27
Payer: COMMERCIAL

## 2024-03-27 VITALS
BODY MASS INDEX: 38.01 KG/M2 | HEART RATE: 78 BPM | SYSTOLIC BLOOD PRESSURE: 144 MMHG | HEIGHT: 68 IN | OXYGEN SATURATION: 97 % | DIASTOLIC BLOOD PRESSURE: 72 MMHG

## 2024-03-27 DIAGNOSIS — F41.9 ANXIETY: ICD-10-CM

## 2024-03-27 DIAGNOSIS — R18.8 OTHER ASCITES: ICD-10-CM

## 2024-03-27 LAB
ALBUMIN FLUID: 0.7 GM/DL
FLUID TYPE: NORMAL INDEX
HCT VFR BLD CALC: 31.2 % (ref 42–52)
HEMOGLOBIN: 9.2 GM/DL (ref 13.5–18)
INR BLD: 1.1 INDEX
LYMPHOCYTES, BODY FLUID: 38 %
MCH RBC QN AUTO: 27.3 PG (ref 27–31)
MCHC RBC AUTO-ENTMCNC: 29.5 % (ref 32–36)
MCV RBC AUTO: 92.6 FL (ref 78–100)
MESOTHELIAL FLUID: 6 /100 WBC
MONOCYTE, FLUID: NORMAL %
NEUTROPHIL, FLUID: 11 %
OTHER CELLS FLUID: NORMAL
PDW BLD-RTO: 17.1 % (ref 11.7–14.9)
PLATELET # BLD: 233 K/CU MM (ref 140–440)
PMV BLD AUTO: 10.1 FL (ref 7.5–11.1)
PROTHROMBIN TIME: 14.9 SECONDS (ref 11.7–14.5)
RBC # BLD: 3.37 M/CU MM (ref 4.6–6.2)
RBC FLUID: NORMAL /CU MM
TRIGL SERPL-MCNC: 169 MG/DL
WBC # BLD: 4.9 K/CU MM (ref 4–10.5)
WBC FLUID: 65 /CU MM

## 2024-03-27 PROCEDURE — 85730 THROMBOPLASTIN TIME PARTIAL: CPT

## 2024-03-27 PROCEDURE — 87205 SMEAR GRAM STAIN: CPT

## 2024-03-27 PROCEDURE — 6360000002 HC RX W HCPCS: Performed by: SPECIALIST

## 2024-03-27 PROCEDURE — 85027 COMPLETE CBC AUTOMATED: CPT

## 2024-03-27 PROCEDURE — 87070 CULTURE OTHR SPECIMN AEROBIC: CPT

## 2024-03-27 PROCEDURE — P9047 ALBUMIN (HUMAN), 25%, 50ML: HCPCS

## 2024-03-27 PROCEDURE — 6360000002 HC RX W HCPCS

## 2024-03-27 PROCEDURE — 49083 ABD PARACENTESIS W/IMAGING: CPT

## 2024-03-27 PROCEDURE — 82042 OTHER SOURCE ALBUMIN QUAN EA: CPT

## 2024-03-27 PROCEDURE — 2500000003 HC RX 250 WO HCPCS: Performed by: RADIOLOGY

## 2024-03-27 PROCEDURE — 84478 ASSAY OF TRIGLYCERIDES: CPT

## 2024-03-27 PROCEDURE — 2580000003 HC RX 258

## 2024-03-27 PROCEDURE — P9047 ALBUMIN (HUMAN), 25%, 50ML: HCPCS | Performed by: SPECIALIST

## 2024-03-27 PROCEDURE — 85610 PROTHROMBIN TIME: CPT

## 2024-03-27 PROCEDURE — 89051 BODY FLUID CELL COUNT: CPT

## 2024-03-27 PROCEDURE — C1729 CATH, DRAINAGE: HCPCS

## 2024-03-27 RX ORDER — ALBUMIN (HUMAN) 12.5 G/50ML
SOLUTION INTRAVENOUS CONTINUOUS PRN
Status: COMPLETED | OUTPATIENT
Start: 2024-03-27 | End: 2024-03-27

## 2024-03-27 RX ORDER — LIDOCAINE HYDROCHLORIDE 10 MG/ML
INJECTION, SOLUTION EPIDURAL; INFILTRATION; INTRACAUDAL; PERINEURAL PRN
Status: COMPLETED | OUTPATIENT
Start: 2024-03-27 | End: 2024-03-27

## 2024-03-27 RX ORDER — SODIUM CHLORIDE 0.9 % (FLUSH) 0.9 %
10 SYRINGE (ML) INJECTION PRN
Status: DISCONTINUED | OUTPATIENT
Start: 2024-03-27 | End: 2024-03-28 | Stop reason: HOSPADM

## 2024-03-27 RX ORDER — ALPRAZOLAM 0.5 MG/1
0.5 TABLET ORAL 2 TIMES DAILY PRN
Qty: 30 TABLET | Refills: 0 | Status: SHIPPED | OUTPATIENT
Start: 2024-03-27 | End: 2024-04-26

## 2024-03-27 RX ADMIN — ALBUMIN (HUMAN) 62.5 G: 0.25 INJECTION, SOLUTION INTRAVENOUS at 09:53

## 2024-03-27 RX ADMIN — LIDOCAINE HYDROCHLORIDE 7 ML: 10 INJECTION, SOLUTION EPIDURAL; INFILTRATION; INTRACAUDAL; PERINEURAL at 09:04

## 2024-03-27 ASSESSMENT — PAIN - FUNCTIONAL ASSESSMENT: PAIN_FUNCTIONAL_ASSESSMENT: NONE - DENIES PAIN

## 2024-03-27 NOTE — OR NURSING
Per Dr Contreras ok to proceed without the pt lab work of PT, INR, and PTT.    Dr Contreras aware of the pt cloudy yellow fluid removed via paracentesis.  Dr Contreras ordered lab work of albumin and triglycerides.    Pt had a total of 9550 ml of cloudy yellow fluid removed.  550 ml of cloudy yellow fluid sent to the lab

## 2024-03-27 NOTE — PROGRESS NOTES
TRANSFER - OUT REPORT:    Verbal report given to Sandrita RN on Boby Glasgow being transferred to [facility] for routine progression of patient care       Report consisted of patient's Situation, Background, Assessment and   Recommendations(SBAR).     Information from the following report(s) Nurse Handoff Report was reviewed with the receiving nurse.    Opportunity for questions and clarification was provided.      Patient transported with:   Registered Nurse

## 2024-03-27 NOTE — BRIEF OP NOTE
Brief Postoperative Note      Patient: Boby Glasgow  YOB: 1945  MRN: [id number]    Date of Procedure: 3/27/2024    Ascites     Post-Op Diagnosis: Same    Paracentesis    Anesthesia: Local only    Estimated Blood Loss (mL): Minimal    Complications: None    Specimens:   Fluid studies    Findings: Technically successful RLQ paracentesis.       Electronically signed by Joesph Contreras MD on 3/27/2024 at 12:59 PM

## 2024-03-28 LAB
Lab: NORMAL
TEST NAME: NORMAL

## 2024-03-29 NOTE — PROGRESS NOTES
Spoke with Gaby,patient's wife and he will arrive at 1000 at [facility] on 4/4/2024 for his procedure at 1130. Patient has been given instructions and knows to take his medications as scheduled.     NPO at Midnight     (Paracentesis, Thoracentesis, Thyroid Bx and Injections may have a light breakfast)  2.   Follow your directions as prescribed by the doctor for your procedure and medications.  3.   Consult your provider as to when to stop blood thinner  4.   Do not take any pain medication within 6 hours of your procedure  5.   Do not drink any alcoholic beverages or use any street drugs 24 hours before procedure.  6.   Please wear simple, loose fitting clothing to the hospital.  Do not bring valuables (money,             credit cards, checkbooks, etc.)     7.   If you  have a Living Will and Durable Power of  for Healthcare, please bring in a copy.  8.   Please bring picture ID,  insurance card, paperwork from the doctors office            (H & P, Consent,  & card for implantable devices).  9.   Report to the information desk on the ground floor.  10. Take a shower the night before or morning of your procedure, do not apply any lotion, oil or powder.  11. If you are going to be sedated for the procedure, you will need a responsible adult to drive you home.

## 2024-03-30 LAB
CULTURE: ABNORMAL
GRAM SMEAR: ABNORMAL
Lab: ABNORMAL
SPECIMEN: ABNORMAL

## 2024-04-04 ENCOUNTER — HOSPITAL ENCOUNTER (OUTPATIENT)
Dept: INTERVENTIONAL RADIOLOGY/VASCULAR | Age: 79
Discharge: HOME | End: 2024-04-04
Payer: COMMERCIAL

## 2024-04-04 VITALS
DIASTOLIC BLOOD PRESSURE: 71 MMHG | RESPIRATION RATE: 16 BRPM | HEART RATE: 88 BPM | SYSTOLIC BLOOD PRESSURE: 144 MMHG | OXYGEN SATURATION: 98 %

## 2024-04-04 DIAGNOSIS — R18.8 OTHER ASCITES: ICD-10-CM

## 2024-04-04 LAB
FLUID TYPE: NORMAL INDEX
LYMPHOCYTES, BODY FLUID: 55 %
MESOTHELIAL FLUID: 4 /100 WBC
MONOCYTE, FLUID: 23 %
NEUTROPHIL, FLUID: 22 %
PROTEIN FLUID: 1.2 GM/DL
RBC FLUID: <2000 /CU MM
WBC FLUID: 89 /CU MM

## 2024-04-04 PROCEDURE — 6360000002 HC RX W HCPCS

## 2024-04-04 PROCEDURE — 89051 BODY FLUID CELL COUNT: CPT

## 2024-04-04 PROCEDURE — 2709999900 IR US GUIDED PARACENTESIS

## 2024-04-04 PROCEDURE — 49083 ABD PARACENTESIS W/IMAGING: CPT

## 2024-04-04 PROCEDURE — 2580000003 HC RX 258

## 2024-04-04 PROCEDURE — 84157 ASSAY OF PROTEIN OTHER: CPT

## 2024-04-04 PROCEDURE — 87205 SMEAR GRAM STAIN: CPT

## 2024-04-04 PROCEDURE — P9047 ALBUMIN (HUMAN), 25%, 50ML: HCPCS

## 2024-04-04 PROCEDURE — 87070 CULTURE OTHR SPECIMN AEROBIC: CPT

## 2024-04-04 RX ORDER — SODIUM CHLORIDE 0.9 % (FLUSH) 0.9 %
10 SYRINGE (ML) INJECTION PRN
OUTPATIENT
Start: 2024-04-04

## 2024-04-04 NOTE — PROGRESS NOTES
TRANSFER - OUT REPORT:    Verbal report given to Nanda RN(name) on Boby Glasgow being transferred to [facility](unit) for routine post-op       Report consisted of patient's Situation, Background, Assessment and   Recommendations(SBAR).     Information from the following report(s) Nurse Handoff Report was reviewed with the receiving nurse.    Opportunity for questions and clarification was provided.      Patient transported with:   Registered Nurse    [id number] removed.   62.5g of albumin given.

## 2024-04-06 LAB
CULTURE: ABNORMAL
GRAM SMEAR: ABNORMAL
Lab: ABNORMAL
SPECIMEN: ABNORMAL

## 2024-04-09 LAB
CULTURE: ABNORMAL
GRAM SMEAR: ABNORMAL
Lab: ABNORMAL
SPECIMEN: ABNORMAL

## 2024-04-11 ENCOUNTER — HOSPITAL ENCOUNTER (OUTPATIENT)
Dept: INTERVENTIONAL RADIOLOGY/VASCULAR | Age: 79
Discharge: HOME | End: 2024-04-11
Payer: COMMERCIAL

## 2024-04-11 VITALS
RESPIRATION RATE: 16 BRPM | DIASTOLIC BLOOD PRESSURE: 78 MMHG | HEART RATE: 84 BPM | OXYGEN SATURATION: 98 % | SYSTOLIC BLOOD PRESSURE: 138 MMHG

## 2024-04-11 DIAGNOSIS — N18.6 ESRD (END STAGE RENAL DISEASE) (HCC): ICD-10-CM

## 2024-04-11 LAB
ALBUMIN FLUID: 0.8 GM/DL
AMYLASE FLUID: 15 U/L
FLUID TYPE: NORMAL INDEX
GLUCOSE, FLUID: 115 MG/DL
LACTATE DEHYDROGENASE, FLUID: 48 IU/L
LYMPHOCYTES, BODY FLUID: 25 %
MESOTHELIAL FLUID: 3 /100 WBC
MONOCYTE, FLUID: 71 %
NEUTROPHIL, FLUID: 3 %
OTHER CELLS FLUID: 1
PROTEIN FLUID: 1.4 GM/DL
RBC FLUID: 4000 /CU MM
SOURCE FLUID: NORMAL
WBC FLUID: 135 /CU MM

## 2024-04-11 PROCEDURE — 87070 CULTURE OTHR SPECIMN AEROBIC: CPT

## 2024-04-11 PROCEDURE — 89051 BODY FLUID CELL COUNT: CPT

## 2024-04-11 PROCEDURE — P9047 ALBUMIN (HUMAN), 25%, 50ML: HCPCS

## 2024-04-11 PROCEDURE — 82150 ASSAY OF AMYLASE: CPT

## 2024-04-11 PROCEDURE — 84157 ASSAY OF PROTEIN OTHER: CPT

## 2024-04-11 PROCEDURE — 82945 GLUCOSE OTHER FLUID: CPT

## 2024-04-11 PROCEDURE — 6360000002 HC RX W HCPCS

## 2024-04-11 PROCEDURE — 83615 LACTATE (LD) (LDH) ENZYME: CPT

## 2024-04-11 PROCEDURE — 82042 OTHER SOURCE ALBUMIN QUAN EA: CPT

## 2024-04-11 PROCEDURE — 7100000010 HC PHASE II RECOVERY - FIRST 15 MIN

## 2024-04-11 PROCEDURE — C1729 CATH, DRAINAGE: HCPCS

## 2024-04-11 PROCEDURE — 87205 SMEAR GRAM STAIN: CPT

## 2024-04-11 PROCEDURE — 49083 ABD PARACENTESIS W/IMAGING: CPT

## 2024-04-11 RX ORDER — SODIUM CHLORIDE 0.9 % (FLUSH) 0.9 %
10 SYRINGE (ML) INJECTION PRN
Status: DISCONTINUED | OUTPATIENT
Start: 2024-04-11 | End: 2024-04-12 | Stop reason: HOSPADM

## 2024-04-11 NOTE — PROGRESS NOTES
TRANSFER - OUT REPORT:    Verbal report given to Aroldo RN on Boby Glasgow being transferred to [facility] for routine post-op       Report consisted of patient's Situation, Background, Assessment and   Recommendations(SBAR).     Successful paracentesis in IR at this time with Dr. Hutchinson. Tolerated well, vitals stable throughout procedure.   7,650 cc cloudy, yellow, pink tinged fluid removed from lower right ABD. Dressing applied.   50g Albumin given IV per protocol. 700cc sent to lab for analysis.    Information from the following report(s) Nurse Handoff Report was reviewed with the receiving nurse.    Opportunity for questions and clarification was provided.      Patient transported with:   Registered Nurse

## 2024-04-11 NOTE — PROGRESS NOTES
Pt arrived for their procedure, iv started, consent signed, and pt is resting in bed with their call light in reach.

## 2024-04-12 LAB
CULTURE: ABNORMAL
GRAM SMEAR: ABNORMAL
Lab: ABNORMAL
SPECIMEN: ABNORMAL

## 2024-04-12 NOTE — PROGRESS NOTES
IR Procedure at [facility]:  Spoke with patient's wife Gaby and patient will arrive at [facility] at 1000 on 4/18/2024 for his procedure at 1130. Patient has been given below instructions and will take his medications as scheduled.        (Paracentesis, Thoracentesis, Thyroid Bx and Injections may have a light breakfast)  2.   Follow your directions as prescribed by the doctor for your procedure and medications.  3.   Consult your provider as to when to stop blood thinner  4.   Do not take any pain medication within 6 hours of your procedure  5.   Do not drink any alcoholic beverages or use any street drugs 24 hours before procedure.  6.   Please wear simple, loose fitting clothing to the hospital.  Do not bring valuables (money,             credit cards, checkbooks, etc.)     7.   If you  have a Living Will and Durable Power of  for Healthcare, please bring in a copy.  8.   Please bring picture ID,  insurance card, paperwork from the doctors office            (H & P, Consent,  & card for implantable devices).  9.   Report to the information desk on the ground floor.  10. Take a shower the night before or morning of your procedure, do not apply any lotion, oil or powder.  11. If you are going to be sedated for the procedure, you will need a responsible adult to drive you home.

## 2024-04-15 LAB
CULTURE: ABNORMAL
GRAM SMEAR: ABNORMAL
Lab: ABNORMAL
SPECIMEN: ABNORMAL

## 2024-04-17 NOTE — PROGRESS NOTES
IR Procedure at [facility]: Spoke with patient's wife Gaby and he will arrive at 1030 at [facility] on 4/25/2024 for his procedure at 1130.  Patient has been given below instructions and will take medications as scheduled.      (Paracentesis, Thoracentesis, Thyroid Bx and Injections may have a light breakfast)  2.   Follow your directions as prescribed by the doctor for your procedure and medications.  3.   Consult your provider as to when to stop blood thinner  4.   Do not take any pain medication within 6 hours of your procedure  5.   Do not drink any alcoholic beverages or use any street drugs 24 hours before procedure.  6.   Please wear simple, loose fitting clothing to the hospital.  Do not bring valuables (money,             credit cards, checkbooks, etc.)     7.   If you  have a Living Will and Durable Power of  for Healthcare, please bring in a copy.  8.   Please bring picture ID,  insurance card, paperwork from the doctors office            (H & P, Consent,  & card for implantable devices).  9.   Report to the information desk on the ground floor.  10. Take a shower the night before or morning of your procedure, do not apply any lotion, oil or powder.  11. If you are going to be sedated for the procedure, you will need a responsible adult to drive you home.

## 2024-04-18 ENCOUNTER — HOSPITAL ENCOUNTER (OUTPATIENT)
Dept: INTERVENTIONAL RADIOLOGY/VASCULAR | Age: 79
Discharge: HOME | End: 2024-04-18
Payer: COMMERCIAL

## 2024-04-18 VITALS
DIASTOLIC BLOOD PRESSURE: 69 MMHG | HEART RATE: 84 BPM | RESPIRATION RATE: 16 BRPM | SYSTOLIC BLOOD PRESSURE: 126 MMHG | OXYGEN SATURATION: 100 %

## 2024-04-18 DIAGNOSIS — R18.8 OTHER ASCITES: ICD-10-CM

## 2024-04-18 LAB
FLUID TYPE: NORMAL INDEX
LYMPHOCYTES, BODY FLUID: 10 %
MESOTHELIAL FLUID: 0 /100 WBC
MONOCYTE, FLUID: 88 %
NEUTROPHIL, FLUID: 2 %
OTHER CELLS FLUID: 0
RBC FLUID: 4000 /CU MM
WBC FLUID: 146 /CU MM

## 2024-04-18 PROCEDURE — 2500000003 HC RX 250 WO HCPCS: Performed by: RADIOLOGY

## 2024-04-18 PROCEDURE — 87205 SMEAR GRAM STAIN: CPT

## 2024-04-18 PROCEDURE — C1729 CATH, DRAINAGE: HCPCS

## 2024-04-18 PROCEDURE — P9047 ALBUMIN (HUMAN), 25%, 50ML: HCPCS | Performed by: RADIOLOGY

## 2024-04-18 PROCEDURE — P9047 ALBUMIN (HUMAN), 25%, 50ML: HCPCS

## 2024-04-18 PROCEDURE — 6360000002 HC RX W HCPCS

## 2024-04-18 PROCEDURE — 87070 CULTURE OTHR SPECIMN AEROBIC: CPT

## 2024-04-18 PROCEDURE — 7100000010 HC PHASE II RECOVERY - FIRST 15 MIN

## 2024-04-18 PROCEDURE — 6360000002 HC RX W HCPCS: Performed by: RADIOLOGY

## 2024-04-18 PROCEDURE — 89051 BODY FLUID CELL COUNT: CPT

## 2024-04-18 PROCEDURE — 49083 ABD PARACENTESIS W/IMAGING: CPT

## 2024-04-18 RX ORDER — ALBUMIN (HUMAN) 12.5 G/50ML
SOLUTION INTRAVENOUS CONTINUOUS PRN
Status: COMPLETED | OUTPATIENT
Start: 2024-04-18 | End: 2024-04-18

## 2024-04-18 RX ORDER — LIDOCAINE HYDROCHLORIDE 10 MG/ML
INJECTION, SOLUTION EPIDURAL; INFILTRATION; INTRACAUDAL; PERINEURAL PRN
Status: COMPLETED | OUTPATIENT
Start: 2024-04-18 | End: 2024-04-18

## 2024-04-18 RX ADMIN — LIDOCAINE HYDROCHLORIDE 8 ML: 10 INJECTION, SOLUTION EPIDURAL; INFILTRATION; INTRACAUDAL; PERINEURAL at 11:11

## 2024-04-18 RX ADMIN — ALBUMIN (HUMAN) 62.5 G: 0.25 INJECTION, SOLUTION INTRAVENOUS at 11:54

## 2024-04-18 NOTE — PROGRESS NOTES
TRANSFER - OUT REPORT: Paracentesis complete, 9300 ml fluid removed, albumin infused per protocol. Fluid sent to lab per order.     Verbal report given to Josselyn on Boby Glasgow      Report consisted of patient's Situation, Background, Assessment and   Recommendations(SBAR).     Information from the following report(s) Nurse Handoff Report was reviewed with the receiving nurse.    Opportunity for questions and clarification was provided.      Patient transported with:   Registered Nurse

## 2024-04-21 LAB
CULTURE: ABNORMAL
GRAM SMEAR: ABNORMAL
Lab: ABNORMAL
SPECIMEN: ABNORMAL

## 2024-04-25 ENCOUNTER — HOSPITAL ENCOUNTER (OUTPATIENT)
Dept: INTERVENTIONAL RADIOLOGY/VASCULAR | Age: 79
Discharge: HOME | End: 2024-04-25
Payer: COMMERCIAL

## 2024-04-25 VITALS
RESPIRATION RATE: 15 BRPM | HEART RATE: 85 BPM | DIASTOLIC BLOOD PRESSURE: 57 MMHG | SYSTOLIC BLOOD PRESSURE: 150 MMHG | OXYGEN SATURATION: 96 %

## 2024-04-25 DIAGNOSIS — R18.8 OTHER ASCITES: ICD-10-CM

## 2024-04-25 LAB
ALBUMIN FLUID: 0.5 GM/DL
AMYLASE FLUID: 17 U/L
APTT: 17.1 SECONDS (ref 25.1–37.1)
FLUID TYPE: NORMAL INDEX
GLUCOSE, FLUID: 115 MG/DL
HCT VFR BLD CALC: 33.1 % (ref 42–52)
HEMOGLOBIN: 10.3 GM/DL (ref 13.5–18)
INR BLD: 1 INDEX
LACTATE DEHYDROGENASE, FLUID: 42 IU/L
LYMPHOCYTES, BODY FLUID: NORMAL %
MCH RBC QN AUTO: 29 PG (ref 27–31)
MCHC RBC AUTO-ENTMCNC: 31.1 % (ref 32–36)
MCV RBC AUTO: 93.2 FL (ref 78–100)
MONOCYTE, FLUID: NORMAL %
NEUTROPHIL, FLUID: NORMAL %
PDW BLD-RTO: 18.3 % (ref 11.7–14.9)
PLATELET # BLD: 208 K/CU MM (ref 140–440)
PMV BLD AUTO: 9.4 FL (ref 7.5–11.1)
PROTEIN FLUID: 1.3 GM/DL
PROTHROMBIN TIME: 13.4 SECONDS (ref 11.7–14.5)
RBC # BLD: 3.55 M/CU MM (ref 4.6–6.2)
RBC FLUID: <2000 /CU MM
SOURCE FLUID: NORMAL
WBC # BLD: 6.4 K/CU MM (ref 4–10.5)
WBC FLUID: 101 /CU MM

## 2024-04-25 PROCEDURE — 83615 LACTATE (LD) (LDH) ENZYME: CPT

## 2024-04-25 PROCEDURE — 82945 GLUCOSE OTHER FLUID: CPT

## 2024-04-25 PROCEDURE — P9047 ALBUMIN (HUMAN), 25%, 50ML: HCPCS | Performed by: RADIOLOGY

## 2024-04-25 PROCEDURE — 84157 ASSAY OF PROTEIN OTHER: CPT

## 2024-04-25 PROCEDURE — 7100000010 HC PHASE II RECOVERY - FIRST 15 MIN

## 2024-04-25 PROCEDURE — 6360000002 HC RX W HCPCS: Performed by: RADIOLOGY

## 2024-04-25 PROCEDURE — C1729 CATH, DRAINAGE: HCPCS

## 2024-04-25 PROCEDURE — 82042 OTHER SOURCE ALBUMIN QUAN EA: CPT

## 2024-04-25 PROCEDURE — 85610 PROTHROMBIN TIME: CPT

## 2024-04-25 PROCEDURE — 87205 SMEAR GRAM STAIN: CPT

## 2024-04-25 PROCEDURE — P9047 ALBUMIN (HUMAN), 25%, 50ML: HCPCS

## 2024-04-25 PROCEDURE — 88108 CYTOPATH CONCENTRATE TECH: CPT

## 2024-04-25 PROCEDURE — 87070 CULTURE OTHR SPECIMN AEROBIC: CPT

## 2024-04-25 PROCEDURE — 2500000003 HC RX 250 WO HCPCS: Performed by: RADIOLOGY

## 2024-04-25 PROCEDURE — 85730 THROMBOPLASTIN TIME PARTIAL: CPT

## 2024-04-25 PROCEDURE — 49083 ABD PARACENTESIS W/IMAGING: CPT

## 2024-04-25 PROCEDURE — 88305 TISSUE EXAM BY PATHOLOGIST: CPT

## 2024-04-25 PROCEDURE — 85027 COMPLETE CBC AUTOMATED: CPT

## 2024-04-25 PROCEDURE — 89051 BODY FLUID CELL COUNT: CPT

## 2024-04-25 PROCEDURE — 7100000011 HC PHASE II RECOVERY - ADDTL 15 MIN

## 2024-04-25 PROCEDURE — 6360000002 HC RX W HCPCS

## 2024-04-25 PROCEDURE — 82150 ASSAY OF AMYLASE: CPT

## 2024-04-25 RX ORDER — LIDOCAINE HYDROCHLORIDE 10 MG/ML
INJECTION, SOLUTION EPIDURAL; INFILTRATION; INTRACAUDAL; PERINEURAL PRN
Status: COMPLETED | OUTPATIENT
Start: 2024-04-25 | End: 2024-04-25

## 2024-04-25 RX ORDER — SODIUM CHLORIDE 0.9 % (FLUSH) 0.9 %
10 SYRINGE (ML) INJECTION PRN
Status: DISCONTINUED | OUTPATIENT
Start: 2024-04-25 | End: 2024-04-26 | Stop reason: HOSPADM

## 2024-04-25 RX ORDER — ALBUMIN (HUMAN) 12.5 G/50ML
SOLUTION INTRAVENOUS CONTINUOUS PRN
Status: COMPLETED | OUTPATIENT
Start: 2024-04-25 | End: 2024-04-25

## 2024-04-25 RX ADMIN — LIDOCAINE HYDROCHLORIDE 9 ML: 10 INJECTION, SOLUTION EPIDURAL; INFILTRATION; INTRACAUDAL; PERINEURAL at 11:25

## 2024-04-25 RX ADMIN — ALBUMIN (HUMAN) 75 G: 0.25 INJECTION, SOLUTION INTRAVENOUS at 12:47

## 2024-04-25 NOTE — PROGRESS NOTES
Patient returned post paracentesis. New IV started and albumin replaced. Call light in reach.Vitals stable.

## 2024-04-25 NOTE — PROGRESS NOTES
Goal Outcome Evaluation:      Pt is alert and oriented X 4, on room air, and X 1 assist with walker. All medications given as ordered and pain managed with PRN medications. Safety checks maintained throughout shift with pt resting in chair, wheels to chair locked, and personal items and call light within reach. Pt participated in therapy and will follow up with outpatient physical therapy at [facility] in [place] starting 04/26/24 at 10 am. Educated pt on discharge instructions to include follow up appointments, incision care/dressing changes, pain management, and if any questions or concerns arise to call the orthopedic office. Pt verbalized understanding.                                          TRANSFER - OUT REPORT:    Verbal report given to [facility] and Getachew Dia RN on Raisa Mackay [facility]       Report consisted of patient's Situation, Background, Assessment and   Recommendations(SBAR). Pt had paracentesis by Dr Meyer Letters pt had 8 liters removed of cloudy yellow fluid. Pt tolerated procedure. Information from the following report(s) Nurse Handoff Report was reviewed with the receiving nurse. Opportunity for questions and clarification was provided.       Patient transported with:   Registered Nurse

## 2024-04-25 NOTE — PROGRESS NOTES
IR Procedure at [facility]:Left message for patient to arrive at  1000 at [facility] on 5/2/2024 for his procedure at 1100. Patient has been given below instructions and will take his medications as scheduled.        (Paracentesis, Thoracentesis, Thyroid Bx and Injections may have a light breakfast)  2.   Follow your directions as prescribed by the doctor for your procedure and medications.  3.   Consult your provider as to when to stop blood thinner  4.   Do not take any pain medication within 6 hours of your procedure  5.   Do not drink any alcoholic beverages or use any street drugs 24 hours before procedure.  6.   Please wear simple, loose fitting clothing to the hospital.  Do not bring valuables (money,             credit cards, checkbooks, etc.)     7.   If you  have a Living Will and Durable Power of  for Healthcare, please bring in a copy.  8.   Please bring picture ID,  insurance card, paperwork from the doctors office            (H & P, Consent,  & card for implantable devices).  9.   Report to the information desk on the ground floor.  10. Take a shower the night before or morning of your procedure, do not apply any lotion, oil or powder.  11. If you are going to be sedated for the procedure, you will need a responsible adult to drive you home.

## 2024-04-25 NOTE — PROGRESS NOTES
TRANSFER - OUT REPORT:    Verbal report given to Kanwal and Marj RNs on Boby Glasgow being transferred back to [facility] room #8 for routine post-op       Report consisted of patient's Situation, Background, Assessment and   Recommendations(SBAR)    Successful paracentesis at this time in IR with Dr. Fitch.   8,000 cc cloudy, bright yellow fluid drained from right lower ABD. Specimen sent to lab for analysis. Pt. tolerated well. Dressing applied.  25g Albumin given in IR- IV extravastated- will re-start IV and finish infusing Albumin in cath holding area prior to d/c.    Information from the following report(s) Nurse Handoff Report was reviewed with the receiving nurse.    Opportunity for questions and clarification was provided.      Patient transported with:   Registered Nurse

## 2024-04-26 DIAGNOSIS — F41.9 ANXIETY: ICD-10-CM

## 2024-04-26 NOTE — TELEPHONE ENCOUNTER
Patient came in for samples of Xifaxan. Per Dr. Aguilar's verbal gave patient 3 boxes of 3 tablets and 3 boxes of 2 tablets.

## 2024-04-26 NOTE — TELEPHONE ENCOUNTER
Patient came into office and said that he did not realize that he had taken the last 2 tablets and needs to get a refill please.  Thank you

## 2024-04-27 LAB
CULTURE: ABNORMAL
GRAM SMEAR: ABNORMAL
Lab: ABNORMAL
SPECIMEN: ABNORMAL

## 2024-04-29 LAB
CULTURE: ABNORMAL
GRAM SMEAR: ABNORMAL
Lab: ABNORMAL
SPECIMEN: ABNORMAL

## 2024-05-02 ENCOUNTER — HOSPITAL ENCOUNTER (OUTPATIENT)
Dept: INTERVENTIONAL RADIOLOGY/VASCULAR | Age: 79
Discharge: HOME | End: 2024-05-02
Payer: COMMERCIAL

## 2024-05-02 VITALS
WEIGHT: 250 LBS | HEART RATE: 91 BPM | TEMPERATURE: 98.5 F | SYSTOLIC BLOOD PRESSURE: 144 MMHG | HEIGHT: 68 IN | RESPIRATION RATE: 15 BRPM | DIASTOLIC BLOOD PRESSURE: 66 MMHG | OXYGEN SATURATION: 97 % | BODY MASS INDEX: 37.89 KG/M2

## 2024-05-02 DIAGNOSIS — R18.8 OTHER ASCITES: ICD-10-CM

## 2024-05-02 LAB
FLUID TYPE: NORMAL INDEX
LYMPHOCYTES, BODY FLUID: 25 %
MESOTHELIAL FLUID: 8 /100 WBC
MONOCYTE, FLUID: 56 %
NEUTROPHIL, FLUID: 8 %
OTHER CELLS FLUID: 0
RBC FLUID: <2000 /CU MM
WBC FLUID: 160 /CU MM

## 2024-05-02 PROCEDURE — 2709999900 IR US GUIDED PARACENTESIS

## 2024-05-02 PROCEDURE — 87205 SMEAR GRAM STAIN: CPT

## 2024-05-02 PROCEDURE — 6360000002 HC RX W HCPCS

## 2024-05-02 PROCEDURE — 6360000002 HC RX W HCPCS: Performed by: SPECIALIST

## 2024-05-02 PROCEDURE — P9047 ALBUMIN (HUMAN), 25%, 50ML: HCPCS | Performed by: SPECIALIST

## 2024-05-02 PROCEDURE — 89051 BODY FLUID CELL COUNT: CPT

## 2024-05-02 PROCEDURE — P9047 ALBUMIN (HUMAN), 25%, 50ML: HCPCS

## 2024-05-02 PROCEDURE — 87070 CULTURE OTHR SPECIMN AEROBIC: CPT

## 2024-05-02 PROCEDURE — 2500000003 HC RX 250 WO HCPCS: Performed by: RADIOLOGY

## 2024-05-02 PROCEDURE — 49083 ABD PARACENTESIS W/IMAGING: CPT

## 2024-05-02 RX ORDER — SODIUM CHLORIDE 0.9 % (FLUSH) 0.9 %
10 SYRINGE (ML) INJECTION PRN
Status: DISCONTINUED | OUTPATIENT
Start: 2024-05-02 | End: 2024-05-03 | Stop reason: HOSPADM

## 2024-05-02 RX ORDER — ALBUMIN (HUMAN) 12.5 G/50ML
SOLUTION INTRAVENOUS CONTINUOUS PRN
Status: COMPLETED | OUTPATIENT
Start: 2024-05-02 | End: 2024-05-02

## 2024-05-02 RX ORDER — DULOXETIN HYDROCHLORIDE 60 MG/1
60 CAPSULE, DELAYED RELEASE ORAL DAILY
Qty: 90 CAPSULE | Refills: 0 | Status: SHIPPED | OUTPATIENT
Start: 2024-05-02

## 2024-05-02 RX ORDER — LIDOCAINE HYDROCHLORIDE 10 MG/ML
INJECTION, SOLUTION EPIDURAL; INFILTRATION; INTRACAUDAL; PERINEURAL PRN
Status: COMPLETED | OUTPATIENT
Start: 2024-05-02 | End: 2024-05-02

## 2024-05-02 RX ADMIN — ALBUMIN (HUMAN) 37.5 G: 0.25 INJECTION, SOLUTION INTRAVENOUS at 12:17

## 2024-05-02 RX ADMIN — LIDOCAINE HYDROCHLORIDE 8 ML: 10 INJECTION, SOLUTION EPIDURAL; INFILTRATION; INTRACAUDAL; PERINEURAL at 12:03

## 2024-05-02 NOTE — PROGRESS NOTES
TRANSFER - OUT REPORT:    Verbal report given to Adela on Boby Glasgow being transferred to IR holding for routine post-op       Report consisted of patient's Situation, Background, Assessment and   Recommendations(SBAR).     Information from the following report(s) Nurse Handoff Report was reviewed with the receiving nurse.    Opportunity for questions and clarification was provided.      Patient transported with:   Registered Nurse

## 2024-05-02 NOTE — PROGRESS NOTES
Returned back from procedure. Tolerated well. Vitals stable. Getting dressed, preparing for discharge soon. Denies pain or needs. ABD dressing is clean, dry, intact.

## 2024-05-03 NOTE — PROGRESS NOTES
IR Procedure at [facility]: Spoke with patient's wife Gaby and he will arrive at 1230 at [facility] on 5/8/2024,patient knows to take his medications and has been given below instructions.  ADDENDUM: left message for Gaby that Boby's time has been changed to 1400 and his arrival time is now 1300. I asked her to call me back and let me know she got my message.         (Paracentesis, Thoracentesis, Thyroid Bx and Injections may have a light breakfast)  2.   Follow your directions as prescribed by the doctor for your procedure and medications.  3.   Consult your provider as to when to stop blood thinner  4.   Do not take any pain medication within 6 hours of your procedure  5.   Do not drink any alcoholic beverages or use any street drugs 24 hours before procedure.  6.   Please wear simple, loose fitting clothing to the hospital.  Do not bring valuables (money,             credit cards, checkbooks, etc.)     7.   If you  have a Living Will and Durable Power of  for Healthcare, please bring in a copy.  8.   Please bring picture ID,  insurance card, paperwork from the doctors office            (H & P, Consent,  & card for implantable devices).  9.   Report to the information desk on the ground floor.  10. Take a shower the night before or morning of your procedure, do not apply any lotion, oil or powder.  11. If you are going to be sedated for the procedure, you will need a responsible adult to drive you home.

## 2024-05-05 LAB
CULTURE: ABNORMAL
GRAM SMEAR: ABNORMAL
Lab: ABNORMAL
SPECIMEN: ABNORMAL

## 2024-05-08 ENCOUNTER — HOSPITAL ENCOUNTER (OUTPATIENT)
Dept: INTERVENTIONAL RADIOLOGY/VASCULAR | Age: 79
Discharge: HOME | End: 2024-05-08
Payer: COMMERCIAL

## 2024-05-08 VITALS — DIASTOLIC BLOOD PRESSURE: 90 MMHG | HEART RATE: 80 BPM | OXYGEN SATURATION: 98 % | SYSTOLIC BLOOD PRESSURE: 147 MMHG

## 2024-05-08 DIAGNOSIS — R18.8 OTHER ASCITES: ICD-10-CM

## 2024-05-08 LAB
FLUID TYPE: NORMAL INDEX
LYMPHOCYTES, BODY FLUID: NORMAL %
MESOTHELIAL FLUID: NORMAL /100 WBC
MONOCYTE, FLUID: NORMAL %
NEUTROPHIL, FLUID: NORMAL %
RBC FLUID: <2000 /CU MM
WBC FLUID: 140 /CU MM

## 2024-05-08 PROCEDURE — 87205 SMEAR GRAM STAIN: CPT

## 2024-05-08 PROCEDURE — 6360000002 HC RX W HCPCS

## 2024-05-08 PROCEDURE — P9047 ALBUMIN (HUMAN), 25%, 50ML: HCPCS | Performed by: RADIOLOGY

## 2024-05-08 PROCEDURE — 87070 CULTURE OTHR SPECIMN AEROBIC: CPT

## 2024-05-08 PROCEDURE — 2500000003 HC RX 250 WO HCPCS: Performed by: RADIOLOGY

## 2024-05-08 PROCEDURE — 49083 ABD PARACENTESIS W/IMAGING: CPT

## 2024-05-08 PROCEDURE — P9047 ALBUMIN (HUMAN), 25%, 50ML: HCPCS

## 2024-05-08 PROCEDURE — 89051 BODY FLUID CELL COUNT: CPT

## 2024-05-08 PROCEDURE — 2709999900 IR US GUIDED PARACENTESIS

## 2024-05-08 PROCEDURE — 6360000002 HC RX W HCPCS: Performed by: RADIOLOGY

## 2024-05-08 RX ORDER — SODIUM CHLORIDE 0.9 % (FLUSH) 0.9 %
10 SYRINGE (ML) INJECTION PRN
Status: DISCONTINUED | OUTPATIENT
Start: 2024-05-08 | End: 2024-05-09 | Stop reason: HOSPADM

## 2024-05-08 RX ORDER — LIDOCAINE HYDROCHLORIDE 10 MG/ML
INJECTION, SOLUTION EPIDURAL; INFILTRATION; INTRACAUDAL; PERINEURAL PRN
Status: COMPLETED | OUTPATIENT
Start: 2024-05-08 | End: 2024-05-08

## 2024-05-08 RX ORDER — ALBUMIN (HUMAN) 12.5 G/50ML
SOLUTION INTRAVENOUS CONTINUOUS PRN
Status: COMPLETED | OUTPATIENT
Start: 2024-05-08 | End: 2024-05-08

## 2024-05-08 RX ADMIN — ALBUMIN (HUMAN) 62.5 G: 0.25 INJECTION, SOLUTION INTRAVENOUS at 15:40

## 2024-05-08 RX ADMIN — LIDOCAINE HYDROCHLORIDE 9 ML: 10 INJECTION, SOLUTION EPIDURAL; INFILTRATION; INTRACAUDAL; PERINEURAL at 15:06

## 2024-05-08 NOTE — PROGRESS NOTES
TRANSFER - OUT REPORT:    Verbal report given to JANNETH Chapman on Boby Glasgow being transferred to [facility] for routine post-op .  Paracentesis performed by Dr. Fitch.  8000cc clear yellow ascitic fluid obtained and sent to the lab.  Vitals WNL.  Dressing intact.  Albumin given.     Report consisted of patient's Situation, Background, Assessment and   Recommendations(SBAR).     Information from the following report(s) Nurse Handoff Report was reviewed with the receiving nurse.    Opportunity for questions and clarification was provided.      Patient transported with:   Registered Nurse

## 2024-05-10 RX ORDER — SPIRONOLACTONE 100 MG/1
100 TABLET, FILM COATED ORAL 2 TIMES DAILY
Qty: 60 TABLET | Refills: 0 | Status: SHIPPED | OUTPATIENT
Start: 2024-05-10

## 2024-05-10 NOTE — PROGRESS NOTES
IR Procedure at [facility]:Left message on patient's wife Gaby's phone for patient to arrive at  1030 at [facility] on 5/16/2024 for his procedure at 1130. Also went over below instructions and told patient to take medications as scheduled.       (Paracentesis, Thoracentesis, Thyroid Bx and Injections may have a light breakfast)  2.   Follow your directions as prescribed by the doctor for your procedure and medications.  3.   Consult your provider as to when to stop blood thinner  4.   Do not take any pain medication within 6 hours of your procedure  5.   Do not drink any alcoholic beverages or use any street drugs 24 hours before procedure.  6.   Please wear simple, loose fitting clothing to the hospital.  Do not bring valuables (money,             credit cards, checkbooks, etc.)     7.   If you  have a Living Will and Durable Power of  for Healthcare, please bring in a copy.  8.   Please bring picture ID,  insurance card, paperwork from the doctors office            (H & P, Consent,  & card for implantable devices).  9.   Report to the information desk on the ground floor.  10. Take a shower the night before or morning of your procedure, do not apply any lotion, oil or powder.  11. If you are going to be sedated for the procedure, you will need a responsible adult to drive you home.

## 2024-05-11 LAB
CULTURE: ABNORMAL
GRAM SMEAR: ABNORMAL
Lab: ABNORMAL
SPECIMEN: ABNORMAL

## 2024-05-15 NOTE — PROGRESS NOTES
.IR Procedure at [facility]:  5/23/2024 1130 arrival 1000     NPO at Midnight     (Paracentesis, Thoracentesis, Thyroid Bx and Injections may have a light breakfast)  2.   Follow your directions as prescribed by the doctor for your procedure and medications.  3.   Consult your provider as to when to stop blood thinner  4.   Do not take any pain medication within 6 hours of your procedure  5.   Do not drink any alcoholic beverages or use any street drugs 24 hours before procedure.  6.   Please wear simple, loose fitting clothing to the hospital.  Do not bring valuables (money,             credit cards, checkbooks, etc.)     7.   If you  have a Living Will and Durable Power of  for Healthcare, please bring in a copy.  8.   Please bring picture ID,  insurance card, paperwork from the doctors office            (H & P, Consent,  & card for implantable devices).  9.   Report to the information desk on the ground floor.  10. Take a shower the night before or morning of your procedure, do not apply any lotion, oil or powder.  11. If you are going to be sedated for the procedure, you will need a responsible adult to drive you home.

## 2024-05-16 ENCOUNTER — HOSPITAL ENCOUNTER (OUTPATIENT)
Dept: INTERVENTIONAL RADIOLOGY/VASCULAR | Age: 79
Discharge: HOME | End: 2024-05-16
Payer: COMMERCIAL

## 2024-05-16 VITALS
RESPIRATION RATE: 16 BRPM | TEMPERATURE: 98.5 F | HEART RATE: 70 BPM | SYSTOLIC BLOOD PRESSURE: 136 MMHG | DIASTOLIC BLOOD PRESSURE: 73 MMHG | OXYGEN SATURATION: 94 %

## 2024-05-16 DIAGNOSIS — R18.8 OTHER ASCITES: ICD-10-CM

## 2024-05-16 LAB
APTT: 33 SECONDS (ref 25.1–37.1)
FLUID TYPE: NORMAL INDEX
INR BLD: 1.1 INDEX
LYMPHOCYTES, BODY FLUID: 43 %
MESOTHELIAL FLUID: 1 /100 WBC
MONOCYTE, FLUID: 51 %
NEUTROPHIL, FLUID: 5 %
PROTHROMBIN TIME: 14.5 SECONDS (ref 11.7–14.5)
RBC FLUID: 7000 /CU MM
WBC FLUID: 236 /CU MM

## 2024-05-16 PROCEDURE — 87070 CULTURE OTHR SPECIMN AEROBIC: CPT

## 2024-05-16 PROCEDURE — P9047 ALBUMIN (HUMAN), 25%, 50ML: HCPCS | Performed by: RADIOLOGY

## 2024-05-16 PROCEDURE — 85027 COMPLETE CBC AUTOMATED: CPT

## 2024-05-16 PROCEDURE — 89051 BODY FLUID CELL COUNT: CPT

## 2024-05-16 PROCEDURE — 49083 ABD PARACENTESIS W/IMAGING: CPT

## 2024-05-16 PROCEDURE — 87205 SMEAR GRAM STAIN: CPT

## 2024-05-16 PROCEDURE — 85610 PROTHROMBIN TIME: CPT

## 2024-05-16 PROCEDURE — 6360000002 HC RX W HCPCS: Performed by: RADIOLOGY

## 2024-05-16 PROCEDURE — 2500000003 HC RX 250 WO HCPCS: Performed by: RADIOLOGY

## 2024-05-16 PROCEDURE — 85730 THROMBOPLASTIN TIME PARTIAL: CPT

## 2024-05-16 PROCEDURE — C1729 CATH, DRAINAGE: HCPCS

## 2024-05-16 PROCEDURE — 6360000002 HC RX W HCPCS

## 2024-05-16 PROCEDURE — P9047 ALBUMIN (HUMAN), 25%, 50ML: HCPCS

## 2024-05-16 RX ORDER — LIDOCAINE HYDROCHLORIDE 10 MG/ML
INJECTION, SOLUTION EPIDURAL; INFILTRATION; INTRACAUDAL; PERINEURAL PRN
Status: COMPLETED | OUTPATIENT
Start: 2024-05-16 | End: 2024-05-16

## 2024-05-16 RX ORDER — ALBUMIN (HUMAN) 12.5 G/50ML
SOLUTION INTRAVENOUS CONTINUOUS PRN
Status: COMPLETED | OUTPATIENT
Start: 2024-05-16 | End: 2024-05-16

## 2024-05-16 RX ORDER — SODIUM CHLORIDE 0.9 % (FLUSH) 0.9 %
10 SYRINGE (ML) INJECTION PRN
Status: DISCONTINUED | OUTPATIENT
Start: 2024-05-16 | End: 2024-05-17 | Stop reason: HOSPADM

## 2024-05-16 RX ADMIN — LIDOCAINE HYDROCHLORIDE 10 ML: 10 INJECTION, SOLUTION EPIDURAL; INFILTRATION; INTRACAUDAL; PERINEURAL at 11:20

## 2024-05-16 RX ADMIN — ALBUMIN (HUMAN) 25 G: 0.25 INJECTION, SOLUTION INTRAVENOUS at 11:37

## 2024-05-16 NOTE — PROGRESS NOTES
To [facility] room 8. VSS. Labs drawn. IV started. No needs at this time.

## 2024-05-16 NOTE — PROGRESS NOTES
TRANSFER - OUT REPORT:    Verbal report given to Shannan RN(name) on Boby Glasgow being transferred to [facility](unit) for routine post-op       Report consisted of patient's Situation, Background, Assessment and   Recommendations(SBAR).     Information from the following report(s) Nurse Handoff Report was reviewed with the receiving nurse.    Opportunity for questions and clarification was provided.      Patient transported with:   Registered Nurse    Paracentesis with 5050 cc of cloudy serosanguinous ascitic fluid drained

## 2024-05-16 NOTE — PROGRESS NOTES
Back to [facility] from IR, VSS. IV removed. Discharge instructions given. All questions answered. No needs at this time.

## 2024-05-17 ENCOUNTER — OFFICE VISIT (OUTPATIENT)
Dept: FAMILY MEDICINE CLINIC | Age: 79
End: 2024-05-17
Payer: COMMERCIAL

## 2024-05-17 VITALS
DIASTOLIC BLOOD PRESSURE: 60 MMHG | BODY MASS INDEX: 37.59 KG/M2 | WEIGHT: 248 LBS | HEART RATE: 90 BPM | OXYGEN SATURATION: 94 % | SYSTOLIC BLOOD PRESSURE: 120 MMHG | HEIGHT: 68 IN | RESPIRATION RATE: 20 BRPM

## 2024-05-17 DIAGNOSIS — K70.31 ALCOHOLIC CIRRHOSIS OF LIVER WITH ASCITES (HCC): ICD-10-CM

## 2024-05-17 DIAGNOSIS — R25.2 FOOT CRAMPS: Primary | ICD-10-CM

## 2024-05-17 DIAGNOSIS — E72.20 HYPERAMMONEMIA (HCC): ICD-10-CM

## 2024-05-17 DIAGNOSIS — F33.0 MAJOR DEPRESSIVE DISORDER, RECURRENT, MILD (HCC): ICD-10-CM

## 2024-05-17 DIAGNOSIS — E11.22 TYPE 2 DIABETES MELLITUS WITH STAGE 3 CHRONIC KIDNEY DISEASE, WITHOUT LONG-TERM CURRENT USE OF INSULIN, UNSPECIFIED WHETHER STAGE 3A OR 3B CKD (HCC): ICD-10-CM

## 2024-05-17 DIAGNOSIS — N18.30 TYPE 2 DIABETES MELLITUS WITH STAGE 3 CHRONIC KIDNEY DISEASE, WITHOUT LONG-TERM CURRENT USE OF INSULIN, UNSPECIFIED WHETHER STAGE 3A OR 3B CKD (HCC): ICD-10-CM

## 2024-05-17 DIAGNOSIS — E66.01 SEVERE OBESITY (BMI 35.0-39.9) WITH COMORBIDITY (HCC): ICD-10-CM

## 2024-05-17 DIAGNOSIS — I50.9 CHRONIC HEART FAILURE, UNSPECIFIED HEART FAILURE TYPE (HCC): ICD-10-CM

## 2024-05-17 DIAGNOSIS — J96.01 ACUTE RESPIRATORY FAILURE WITH HYPOXIA (HCC): ICD-10-CM

## 2024-05-17 PROCEDURE — 99214 OFFICE O/P EST MOD 30 MIN: CPT | Performed by: FAMILY MEDICINE

## 2024-05-17 PROCEDURE — 3074F SYST BP LT 130 MM HG: CPT | Performed by: FAMILY MEDICINE

## 2024-05-17 PROCEDURE — 3078F DIAST BP <80 MM HG: CPT | Performed by: FAMILY MEDICINE

## 2024-05-17 PROCEDURE — 1123F ACP DISCUSS/DSCN MKR DOCD: CPT | Performed by: FAMILY MEDICINE

## 2024-05-17 ASSESSMENT — ENCOUNTER SYMPTOMS
BLOOD IN STOOL: 0
ABDOMINAL PAIN: 0
DIARRHEA: 0
CHEST TIGHTNESS: 0
TROUBLE SWALLOWING: 0
EYE PAIN: 0
VOMITING: 0
NAUSEA: 0
SHORTNESS OF BREATH: 0
WHEEZING: 0

## 2024-05-17 NOTE — PROGRESS NOTES
5/17/2024    Boby Glasgow    Chief Complaint   Patient presents with    4 months     OTHER     Bilat hand and foot cramps x 3-4 months. Worse in morning. Hands cramp up and has to pull fingers apartment        HPI  History was obtained from the patient.  Boby is a 78 y.o. male who presents today with routine recheck.  Patient with history of diabetes mellitus type 2, profound hearing loss, obesity, CKD 3, anxiety, GERD, alcoholic cirrhosis with hepatic encephalopathy and massive ascites as well as hypothyroidism.  Patient is complaining of some bilateral foot and hand cramps for 3 to 4 months.  He undergoes frequent large-volume paracentesis (about 1 time per week) per IR.  Overall the volume per paracentesis has decreased.  He is appetite is good and denies other intercurrent issues.  Meds overall are tolerated -he has been following up closely with GI medicine.    REVIEW OF SYMPTOMS    Review of Systems   Constitutional:  Negative for activity change and fatigue.   HENT:  Positive for hearing loss. Negative for congestion, mouth sores and trouble swallowing.    Eyes:  Negative for pain and visual disturbance.   Respiratory:  Negative for chest tightness, shortness of breath and wheezing.    Cardiovascular:  Negative for chest pain and palpitations.   Gastrointestinal:  Negative for abdominal pain, blood in stool, diarrhea, nausea and vomiting.        Patient with known alcoholic cirrhosis and massive ascites as well as intermittent issues of hepatic encephalopathy.   Endocrine: Negative for cold intolerance, polydipsia and polyuria.        Patient with history of hypothyroidism.   Genitourinary:  Negative for dysuria, frequency and urgency.   Musculoskeletal:  Negative for arthralgias, gait problem and neck stiffness.   Skin:  Negative for rash.   Allergic/Immunologic: Negative for environmental allergies.   Neurological:  Negative for dizziness, seizures, speech difficulty and weakness.   Hematological:

## 2024-05-19 LAB
CULTURE: ABNORMAL
GRAM SMEAR: ABNORMAL
Lab: ABNORMAL
SPECIMEN: ABNORMAL

## 2024-05-21 ENCOUNTER — HOSPITAL ENCOUNTER (OUTPATIENT)
Age: 79
Discharge: HOME | End: 2024-05-21
Payer: COMMERCIAL

## 2024-05-21 DIAGNOSIS — E72.20 HYPERAMMONEMIA (HCC): ICD-10-CM

## 2024-05-21 DIAGNOSIS — R25.2 FOOT CRAMPS: ICD-10-CM

## 2024-05-21 DIAGNOSIS — N18.30 TYPE 2 DIABETES MELLITUS WITH STAGE 3 CHRONIC KIDNEY DISEASE, WITHOUT LONG-TERM CURRENT USE OF INSULIN, UNSPECIFIED WHETHER STAGE 3A OR 3B CKD (HCC): ICD-10-CM

## 2024-05-21 DIAGNOSIS — K70.31 ALCOHOLIC CIRRHOSIS OF LIVER WITH ASCITES (HCC): ICD-10-CM

## 2024-05-21 DIAGNOSIS — E66.01 SEVERE OBESITY (BMI 35.0-39.9) WITH COMORBIDITY (HCC): ICD-10-CM

## 2024-05-21 DIAGNOSIS — E11.22 TYPE 2 DIABETES MELLITUS WITH STAGE 3 CHRONIC KIDNEY DISEASE, WITHOUT LONG-TERM CURRENT USE OF INSULIN, UNSPECIFIED WHETHER STAGE 3A OR 3B CKD (HCC): ICD-10-CM

## 2024-05-21 LAB
ALBUMIN SERPL-MCNC: 3.8 GM/DL (ref 3.4–5)
ALP BLD-CCNC: 149 IU/L (ref 40–128)
ALT SERPL-CCNC: 18 U/L (ref 10–40)
AMMONIA: 72 UMOL/L (ref 16–60)
ANION GAP SERPL CALCULATED.3IONS-SCNC: 13 MMOL/L (ref 7–16)
AST SERPL-CCNC: 40 IU/L (ref 15–37)
BILIRUB SERPL-MCNC: 0.6 MG/DL (ref 0–1)
BUN SERPL-MCNC: 15 MG/DL (ref 6–23)
CALCIUM SERPL-MCNC: 9.1 MG/DL (ref 8.3–10.6)
CHLORIDE BLD-SCNC: 98 MMOL/L (ref 99–110)
CO2: 26 MMOL/L (ref 21–32)
CREAT SERPL-MCNC: 1.5 MG/DL (ref 0.9–1.3)
ESTIMATED AVERAGE GLUCOSE: 105 MG/DL
GFR, ESTIMATED: 48 ML/MIN/1.73M2
GLUCOSE SERPL-MCNC: 117 MG/DL (ref 70–99)
HBA1C MFR BLD: 5.3 % (ref 4.2–6.3)
HCT VFR BLD CALC: 31.8 % (ref 42–52)
HEMOGLOBIN: 10.2 GM/DL (ref 13.5–18)
MAGNESIUM: 1.6 MG/DL (ref 1.8–2.4)
MCH RBC QN AUTO: 29.2 PG (ref 27–31)
MCHC RBC AUTO-ENTMCNC: 32.1 % (ref 32–36)
MCV RBC AUTO: 91.1 FL (ref 78–100)
PDW BLD-RTO: 16.4 % (ref 11.7–14.9)
PLATELET # BLD: 195 K/CU MM (ref 140–440)
PMV BLD AUTO: 9.4 FL (ref 7.5–11.1)
POTASSIUM SERPL-SCNC: 3.8 MMOL/L (ref 3.5–5.1)
RBC # BLD: 3.49 M/CU MM (ref 4.6–6.2)
SODIUM BLD-SCNC: 137 MMOL/L (ref 135–145)
TOTAL PROTEIN: 7.6 GM/DL (ref 6.4–8.2)
WBC # BLD: 5.3 K/CU MM (ref 4–10.5)

## 2024-05-21 PROCEDURE — 36415 COLL VENOUS BLD VENIPUNCTURE: CPT

## 2024-05-21 PROCEDURE — 80053 COMPREHEN METABOLIC PANEL: CPT

## 2024-05-21 PROCEDURE — 85027 COMPLETE CBC AUTOMATED: CPT

## 2024-05-21 PROCEDURE — 83735 ASSAY OF MAGNESIUM: CPT

## 2024-05-21 PROCEDURE — 82140 ASSAY OF AMMONIA: CPT

## 2024-05-21 PROCEDURE — 83036 HEMOGLOBIN GLYCOSYLATED A1C: CPT

## 2024-05-22 NOTE — PROGRESS NOTES
IR Procedure at [facility]: Left message on patient's wife Gaby's phone for patient to arrive at 1030 at [facility] on 5/23/2024 and 5/30/2024 for his procedure at  [facility] at 1130. Patient has been given home instructions and will take his medications as scheduled.      (Paracentesis, Thoracentesis, Thyroid Bx and Injections may have a light breakfast)  2.   Follow your directions as prescribed by the doctor for your procedure and medications.  3.   Consult your provider as to when to stop blood thinner  4.   Do not take any pain medication within 6 hours of your procedure  5.   Do not drink any alcoholic beverages or use any street drugs 24 hours before procedure.  6.   Please wear simple, loose fitting clothing to the hospital.  Do not bring valuables (money,             credit cards, checkbooks, etc.)     7.   If you  have a Living Will and Durable Power of  for Healthcare, please bring in a copy.  8.   Please bring picture ID,  insurance card, paperwork from the doctors office            (H & P, Consent,  & card for implantable devices).  9.   Report to the information desk on the ground floor.  10. Take a shower the night before or morning of your procedure, do not apply any lotion, oil or powder.  11. If you are going to be sedated for the procedure, you will need a responsible adult to drive you home.

## 2024-05-23 ENCOUNTER — HOSPITAL ENCOUNTER (OUTPATIENT)
Dept: INTERVENTIONAL RADIOLOGY/VASCULAR | Age: 79
Discharge: HOME | End: 2024-05-23
Payer: COMMERCIAL

## 2024-05-23 VITALS
DIASTOLIC BLOOD PRESSURE: 64 MMHG | OXYGEN SATURATION: 99 % | HEART RATE: 87 BPM | SYSTOLIC BLOOD PRESSURE: 164 MMHG | RESPIRATION RATE: 18 BRPM | TEMPERATURE: 97 F

## 2024-05-23 DIAGNOSIS — R18.8 OTHER ASCITES: ICD-10-CM

## 2024-05-23 LAB
FLUID TYPE: NORMAL INDEX
LYMPHOCYTES, BODY FLUID: 50 %
MESOTHELIAL FLUID: 0 /100 WBC
MONOCYTE, FLUID: 45 %
NEUTROPHIL, FLUID: 5 %
OTHER CELLS FLUID: 0
RBC FLUID: <2000 /CU MM
WBC FLUID: 93 /CU MM

## 2024-05-23 PROCEDURE — 7100000010 HC PHASE II RECOVERY - FIRST 15 MIN

## 2024-05-23 PROCEDURE — 49083 ABD PARACENTESIS W/IMAGING: CPT

## 2024-05-23 PROCEDURE — P9047 ALBUMIN (HUMAN), 25%, 50ML: HCPCS

## 2024-05-23 PROCEDURE — 89051 BODY FLUID CELL COUNT: CPT

## 2024-05-23 PROCEDURE — C1729 CATH, DRAINAGE: HCPCS

## 2024-05-23 PROCEDURE — 87070 CULTURE OTHR SPECIMN AEROBIC: CPT

## 2024-05-23 PROCEDURE — 87205 SMEAR GRAM STAIN: CPT

## 2024-05-23 PROCEDURE — 6360000002 HC RX W HCPCS

## 2024-05-23 RX ORDER — SODIUM CHLORIDE 0.9 % (FLUSH) 0.9 %
10 SYRINGE (ML) INJECTION PRN
Status: DISCONTINUED | OUTPATIENT
Start: 2024-05-23 | End: 2024-05-24 | Stop reason: HOSPADM

## 2024-05-23 RX ORDER — ALBUMIN (HUMAN) 12.5 G/50ML
25 SOLUTION INTRAVENOUS ONCE
Status: DISCONTINUED | OUTPATIENT
Start: 2024-05-23 | End: 2024-05-24 | Stop reason: HOSPADM

## 2024-05-23 NOTE — PROGRESS NOTES
TRANSFER - OUT REPORT:    Verbal report given to Marj RN and Humaira RN on Boby Glasgow being transferred to [facility] for routine post-op       Report consisted of patient's Situation, Background, Assessment and   Recommendations(SBAR).     Information from the following report(s) Nurse Handoff Report was reviewed with the receiving nurse.    Opportunity for questions and clarification was provided.      Patient transported with:   Registered Nurse    3250cc of milky yellow fluid was collected. 1000cc wasz sent to the lab per orders

## 2024-05-23 NOTE — H&P
IR PREPROCEDURE NOTE    5/23/24    PRE OP DX  ASCITES    US GUIDED PARACENTESIS EXPLAINED TO PT INCLUDING RISKS, BENEFITS AND ALTERNATIVES AND PT ELECTS TO PROCEED WITH PARACENTESIS    WINSTON LOPEZ DO

## 2024-05-23 NOTE — BRIEF OP NOTE
Brief Postoperative Note      Patient: Boby Glasgow  YOB: 1945  MRN: [id number]    Date of Procedure: 5/23/2024    * No Diagnosis Codes entered *ASCITES    Post-Op Diagnosis: Same       * No procedures listed *US GUIDED PARACENTESIS    * No surgeons listed *WINSTON MADRIGAL DO    Assistant:  * No surgical staff found *    Anesthesia: * No anesthesia type entered *LOCAL    Estimated Blood Loss (mL): Minimal    Complications: None    Specimens:   * No specimens in log *    Implants:  * No implants in log *      Drains: * No LDAs found *    Findings:  Infection Present At Time Of Surgery (PATOS) (choose all levels that have infection present):  No infection present  Other Findings: PARACENTESIS CATHETER CONFIRMED WITHIN ASCITES.  NO COMPLICATION SUGGESTED.     Electronically signed by Winston Madrigal DO on 5/23/2024 at 12:33 PM

## 2024-05-26 LAB
CULTURE: ABNORMAL
GRAM SMEAR: ABNORMAL
Lab: ABNORMAL
SPECIMEN: ABNORMAL

## 2024-05-30 ENCOUNTER — HOSPITAL ENCOUNTER (OUTPATIENT)
Dept: INTERVENTIONAL RADIOLOGY/VASCULAR | Age: 79
Discharge: HOME | End: 2024-05-30
Payer: COMMERCIAL

## 2024-05-30 VITALS
DIASTOLIC BLOOD PRESSURE: 74 MMHG | RESPIRATION RATE: 16 BRPM | OXYGEN SATURATION: 97 % | SYSTOLIC BLOOD PRESSURE: 158 MMHG | HEART RATE: 73 BPM

## 2024-05-30 DIAGNOSIS — R18.8 OTHER ASCITES: ICD-10-CM

## 2024-05-30 LAB
ALBUMIN FLUID: 0.7 GM/DL
FLUID TYPE: NORMAL INDEX
LYMPHOCYTES, BODY FLUID: 16 %
MONOCYTE, FLUID: 72 %
NEUTROPHIL, FLUID: 11 %
OTHER CELLS FLUID: 1
PROTEIN FLUID: 1.5 GM/DL
RBC FLUID: <2000 /CU MM
SOURCE FLUID: NORMAL
WBC FLUID: 112 /CU MM

## 2024-05-30 PROCEDURE — P9047 ALBUMIN (HUMAN), 25%, 50ML: HCPCS

## 2024-05-30 PROCEDURE — 84157 ASSAY OF PROTEIN OTHER: CPT

## 2024-05-30 PROCEDURE — 6360000002 HC RX W HCPCS

## 2024-05-30 PROCEDURE — 89051 BODY FLUID CELL COUNT: CPT

## 2024-05-30 PROCEDURE — 82042 OTHER SOURCE ALBUMIN QUAN EA: CPT

## 2024-05-30 PROCEDURE — 2709999900 IR US GUIDED PARACENTESIS

## 2024-05-30 PROCEDURE — 2500000003 HC RX 250 WO HCPCS: Performed by: RADIOLOGY

## 2024-05-30 PROCEDURE — 7100000011 HC PHASE II RECOVERY - ADDTL 15 MIN

## 2024-05-30 PROCEDURE — 7100000010 HC PHASE II RECOVERY - FIRST 15 MIN

## 2024-05-30 PROCEDURE — 87205 SMEAR GRAM STAIN: CPT

## 2024-05-30 PROCEDURE — 87070 CULTURE OTHR SPECIMN AEROBIC: CPT

## 2024-05-30 PROCEDURE — 49083 ABD PARACENTESIS W/IMAGING: CPT

## 2024-05-30 RX ORDER — LIDOCAINE HYDROCHLORIDE 10 MG/ML
INJECTION, SOLUTION EPIDURAL; INFILTRATION; INTRACAUDAL; PERINEURAL PRN
Status: COMPLETED | OUTPATIENT
Start: 2024-05-30 | End: 2024-05-30

## 2024-05-30 RX ORDER — ALBUMIN (HUMAN) 12.5 G/50ML
37.5 SOLUTION INTRAVENOUS ONCE
Status: COMPLETED | OUTPATIENT
Start: 2024-05-30 | End: 2024-05-30

## 2024-05-30 RX ADMIN — LIDOCAINE HYDROCHLORIDE 8 ML: 10 INJECTION, SOLUTION EPIDURAL; INFILTRATION; INTRACAUDAL; PERINEURAL at 11:02

## 2024-05-30 RX ADMIN — ALBUMIN (HUMAN) 37.5 G: 12.5 SOLUTION INTRAVENOUS at 11:59

## 2024-05-30 NOTE — OR NURSING
Pt had a paracentesis performed by Dr Fitch with a total of 5700 cc of cloudy yellow fluid removed.  Of that 1,000 cc of cloudy yellow fluid was sent to the lab.

## 2024-05-30 NOTE — PROGRESS NOTES
Returned from IR procedure. Preparing for discharge soon. Tolerated procedure well. Vitals stable, pt denies pain or needs.

## 2024-05-30 NOTE — PROGRESS NOTES
TRANSFER - OUT REPORT:    Verbal report given to Kanwal/Lainey RN on Boby Glasgow being transferred to [facility] for routine progression of patient care       Report consisted of patient's Situation, Background, Assessment and   Recommendations(SBAR).     Information from the following report(s) Nurse Handoff Report was reviewed with the receiving nurse.    Opportunity for questions and clarification was provided.      Patient transported with:   Registered Nurse

## 2024-06-02 LAB
CULTURE: ABNORMAL
GRAM SMEAR: ABNORMAL
Lab: ABNORMAL
SPECIMEN: ABNORMAL

## 2024-06-05 NOTE — PROGRESS NOTES
IR Procedure at [facility]:  Left message for patient to arrive at 1200 at [facility] on 6/6/2024 for his procedure at 1300. Patient has been given below instructions and will take his medications as scheduled.        (Paracentesis, Thoracentesis, Thyroid Bx and Injections may have a light breakfast)  2.   Follow your directions as prescribed by the doctor for your procedure and medications.  3.   Consult your provider as to when to stop blood thinner  4.   Do not take any pain medication within 6 hours of your procedure  5.   Do not drink any alcoholic beverages or use any street drugs 24 hours before procedure.  6.   Please wear simple, loose fitting clothing to the hospital.  Do not bring valuables (money,             credit cards, checkbooks, etc.)     7.   If you  have a Living Will and Durable Power of  for Healthcare, please bring in a copy.  8.   Please bring picture ID,  insurance card, paperwork from the doctors office            (H & P, Consent,  & card for implantable devices).  9.   Report to the information desk on the ground floor.  10. Take a shower the night before or morning of your procedure, do not apply any lotion, oil or powder.  11. If you are going to be sedated for the procedure, you will need a responsible adult to drive you home.

## 2024-06-06 ENCOUNTER — HOSPITAL ENCOUNTER (OUTPATIENT)
Dept: INTERVENTIONAL RADIOLOGY/VASCULAR | Age: 79
Discharge: HOME | End: 2024-06-06
Payer: COMMERCIAL

## 2024-06-06 VITALS
OXYGEN SATURATION: 94 % | TEMPERATURE: 97.8 F | RESPIRATION RATE: 14 BRPM | HEART RATE: 90 BPM | SYSTOLIC BLOOD PRESSURE: 133 MMHG | DIASTOLIC BLOOD PRESSURE: 81 MMHG

## 2024-06-06 DIAGNOSIS — R18.8 OTHER ASCITES: ICD-10-CM

## 2024-06-06 LAB
FLUID TYPE: NORMAL INDEX
LYMPHOCYTES, BODY FLUID: 29 %
MESOTHELIAL FLUID: 3 /100 WBC
MONOCYTE, FLUID: 62 %
NEUTROPHIL, FLUID: 9 %
OTHER CELLS FLUID: 0
RBC FLUID: <2000 /CU MM
WBC FLUID: 164 /CU MM

## 2024-06-06 PROCEDURE — 49083 ABD PARACENTESIS W/IMAGING: CPT

## 2024-06-06 PROCEDURE — 89051 BODY FLUID CELL COUNT: CPT

## 2024-06-06 PROCEDURE — 6360000002 HC RX W HCPCS: Performed by: RADIOLOGY

## 2024-06-06 PROCEDURE — P9047 ALBUMIN (HUMAN), 25%, 50ML: HCPCS

## 2024-06-06 PROCEDURE — 6360000002 HC RX W HCPCS

## 2024-06-06 PROCEDURE — 2500000003 HC RX 250 WO HCPCS: Performed by: RADIOLOGY

## 2024-06-06 PROCEDURE — 87070 CULTURE OTHR SPECIMN AEROBIC: CPT

## 2024-06-06 PROCEDURE — 7100000010 HC PHASE II RECOVERY - FIRST 15 MIN

## 2024-06-06 PROCEDURE — P9047 ALBUMIN (HUMAN), 25%, 50ML: HCPCS | Performed by: RADIOLOGY

## 2024-06-06 PROCEDURE — 87205 SMEAR GRAM STAIN: CPT

## 2024-06-06 PROCEDURE — 2709999900 IR US GUIDED PARACENTESIS

## 2024-06-06 RX ORDER — ALBUMIN (HUMAN) 12.5 G/50ML
SOLUTION INTRAVENOUS CONTINUOUS PRN
Status: COMPLETED | OUTPATIENT
Start: 2024-06-06 | End: 2024-06-06

## 2024-06-06 RX ORDER — LIDOCAINE HYDROCHLORIDE 10 MG/ML
INJECTION, SOLUTION EPIDURAL; INFILTRATION; INTRACAUDAL; PERINEURAL PRN
Status: COMPLETED | OUTPATIENT
Start: 2024-06-06 | End: 2024-06-06

## 2024-06-06 RX ADMIN — LIDOCAINE HYDROCHLORIDE 11 ML: 10 INJECTION, SOLUTION EPIDURAL; INFILTRATION; INTRACAUDAL; PERINEURAL at 13:30

## 2024-06-06 RX ADMIN — ALBUMIN (HUMAN) 37.5 G: 0.25 INJECTION, SOLUTION INTRAVENOUS at 14:08

## 2024-06-06 NOTE — OR NURSING
Pt had a paracentesis performed by Dr Fitch with  total 6950 cc of cloudy yellow fluid removed.  Of that total  600 cc of fluid was sent to the lab.

## 2024-06-06 NOTE — PROGRESS NOTES
Returned from IR procedure. Tolerated well. Dressing is clean, dry, intact. Vitals stable. IV removed and pt. Preparing for discharge.

## 2024-06-06 NOTE — PROGRESS NOTES
TRANSFER - OUT REPORT:    Verbal report given to Lainey LAGUNAS on Boby Glasgow being transferred to [facility] for routine progression of patient care       Report consisted of patient's Situation, Background, Assessment and   Recommendations(SBAR).     Information from the following report(s) Nurse Handoff Report was reviewed with the receiving nurse.    Opportunity for questions and clarification was provided.      Patient transported with:   Registered Nurse

## 2024-06-06 NOTE — PROGRESS NOTES
IR Procedure at [facility]:  Spoke with patient's wife Gaby and patient will arrive at 1100 at [facility] on 6/13/2024 for his procedure at 1200. Patient will take medications as scheduled and has been given below instructions.     (Paracentesis, Thoracentesis, Thyroid Bx and Injections may have a light breakfast)  2.   Follow your directions as prescribed by the doctor for your procedure and medications.  3.   Consult your provider as to when to stop blood thinner  4.   Do not take any pain medication within 6 hours of your procedure  5.   Do not drink any alcoholic beverages or use any street drugs 24 hours before procedure.  6.   Please wear simple, loose fitting clothing to the hospital.  Do not bring valuables (money,             credit cards, checkbooks, etc.)     7.   If you  have a Living Will and Durable Power of  for Healthcare, please bring in a copy.  8.   Please bring picture ID,  insurance card, paperwork from the doctors office            (H & P, Consent,  & card for implantable devices).  9.   Report to the information desk on the ground floor.  10. Take a shower the night before or morning of your procedure, do not apply any lotion, oil or powder.  11. If you are going to be sedated for the procedure, you will need a responsible adult to drive you home.

## 2024-06-09 LAB
CULTURE: ABNORMAL
GRAM SMEAR: ABNORMAL
Lab: ABNORMAL
SPECIMEN: ABNORMAL

## 2024-06-13 ENCOUNTER — HOSPITAL ENCOUNTER (OUTPATIENT)
Dept: INTERVENTIONAL RADIOLOGY/VASCULAR | Age: 79
Discharge: HOME | End: 2024-06-13
Payer: COMMERCIAL

## 2024-06-13 VITALS
DIASTOLIC BLOOD PRESSURE: 61 MMHG | SYSTOLIC BLOOD PRESSURE: 141 MMHG | OXYGEN SATURATION: 95 % | RESPIRATION RATE: 16 BRPM | HEART RATE: 85 BPM

## 2024-06-13 DIAGNOSIS — R18.8 OTHER ASCITES: ICD-10-CM

## 2024-06-13 LAB
FLUID TYPE: NORMAL INDEX
LYMPHOCYTES, BODY FLUID: 48 %
MESOTHELIAL FLUID: 0 /100 WBC
MONOCYTE, FLUID: 50 %
NEUTROPHIL, FLUID: 2 %
OTHER CELLS FLUID: 0
RBC FLUID: <2000 /CU MM
WBC FLUID: 169 /CU MM

## 2024-06-13 PROCEDURE — 2709999900 IR US GUIDED PARACENTESIS

## 2024-06-13 PROCEDURE — 89051 BODY FLUID CELL COUNT: CPT

## 2024-06-13 PROCEDURE — P9047 ALBUMIN (HUMAN), 25%, 50ML: HCPCS

## 2024-06-13 PROCEDURE — 87205 SMEAR GRAM STAIN: CPT

## 2024-06-13 PROCEDURE — 87070 CULTURE OTHR SPECIMN AEROBIC: CPT

## 2024-06-13 PROCEDURE — 7100000010 HC PHASE II RECOVERY - FIRST 15 MIN

## 2024-06-13 PROCEDURE — 6360000002 HC RX W HCPCS

## 2024-06-13 PROCEDURE — 49083 ABD PARACENTESIS W/IMAGING: CPT

## 2024-06-13 RX ORDER — ALBUMIN (HUMAN) 12.5 G/50ML
37.5 SOLUTION INTRAVENOUS ONCE
Status: DISCONTINUED | OUTPATIENT
Start: 2024-06-13 | End: 2024-06-14 | Stop reason: HOSPADM

## 2024-06-13 NOTE — PROGRESS NOTES
TRANSFER - OUT REPORT:    Verbal report given to Marj LAGUNAS on Boby Glasgow being transferred to [facility] for routine post-op       Report consisted of patient's Situation, Background, Assessment and   Recommendations(SBAR).     Information from the following report(s) Nurse Handoff Report was reviewed with the receiving nurse.    Opportunity for questions and clarification was provided.      Patient transported with:   Registered Nurse    5150cc of milky white fluid was drained. 1000cc was sent to the lab per order. Dry sterile dressing was applied.

## 2024-06-13 NOTE — PROGRESS NOTES
IR PREPROCEDURE NOTE    6/13/24    US GUIDED PARACENTESIS EXPLAINED TO PT INCLUDING RISKS, BENEFITS AND ALTERNATIVES AND PT ELECTS TO PROCEED WITH PARACENTESIS.    WINSTON LOPEZ DO

## 2024-06-13 NOTE — BRIEF OP NOTE
Brief Postoperative Note      Patient: Boby Glasgow  YOB: 1945  MRN: [id number]    Date of Procedure: 6/13/2024    * No Diagnosis Codes entered *ASCITES    Post-Op Diagnosis: Same       * No procedures listed *US GUIDED PARACENTESIS    * No surgeons listed *WINSTON MADRIGAL DO    Assistant:  * No surgical staff found *    Anesthesia: * No anesthesia type entered *LOCAL    Estimated Blood Loss (mL): Minimal    Complications: None    Specimens:   * No specimens in log *    Implants:  * No implants in log *      Drains: * No LDAs found *    Findings:  Infection Present At Time Of Surgery (PATOS) (choose all levels that have infection present):  No infection present  Other Findings: PARACENTESIS NEEDLE/CATHETER CONFIRMED WITHIN ASCITIC FLUID.  NO COMPLICATION SUGGESTED.      Electronically signed by Winston Madrigal DO on 6/13/2024 at 1:08 PM

## 2024-06-13 NOTE — PROGRESS NOTES
1337 Pt arrived back to [facility] 9, dressing wnl with no bleeding noted.

## 2024-06-14 NOTE — PROGRESS NOTES
IR Procedure at [facility]:  Left a message on patient's wife Gaby's phone for patient to arrive at 1030 at [facility] on 6/20/2024 for his procedure at 1130. Patient has been given home instructions and will take his medications as scheduled.         2.   Follow your directions as prescribed by the doctor for your procedure and medications.  3.   Consult your provider as to when to stop blood thinner  4.   Do not take any pain medication within 6 hours of your procedure  5.   Do not drink any alcoholic beverages or use any street drugs 24 hours before procedure.  6.   Please wear simple, loose fitting clothing to the hospital.  Do not bring valuables (money,             credit cards, checkbooks, etc.)     7.   If you  have a Living Will and Durable Power of  for Healthcare, please bring in a copy.  8.   Please bring picture ID,  insurance card, paperwork from the doctors office            (H & P, Consent,  & card for implantable devices).  9.   Report to the information desk on the ground floor.  10. Take a shower the night before or morning of your procedure, do not apply any lotion, oil or powder.  11. If you are going to be sedated for the procedure, you will need a responsible adult to drive you home.

## 2024-06-16 LAB
CULTURE: ABNORMAL
GRAM SMEAR: ABNORMAL
Lab: ABNORMAL
SPECIMEN: ABNORMAL

## 2024-06-19 NOTE — PROGRESS NOTES
IR Procedure at [facility]:  Spoke with patient's wife Gaby and he will arrive at [facility] on 6/27/2024 at 1100 at  for his procedure at 1200. Patient has been given below instructions and will take his medications as scheduled.    2.   Follow your directions as prescribed by the doctor for your procedure and medications.  3.   Consult your provider as to when to stop blood thinner  4.   Do not take any pain medication within 6 hours of your procedure  5.   Do not drink any alcoholic beverages or use any street drugs 24 hours before procedure.  6.   Please wear simple, loose fitting clothing to the hospital.  Do not bring valuables (money,             credit cards, checkbooks, etc.)     7.   If you  have a Living Will and Durable Power of  for Healthcare, please bring in a copy.  8.   Please bring picture ID,  insurance card, paperwork from the doctors office            (H & P, Consent,  & card for implantable devices).  9.   Report to the information desk on the ground floor.  10. Take a shower the night before or morning of your procedure, do not apply any lotion, oil or powder.  11. If you are going to be sedated for the procedure, you will need a responsible adult to drive you home.

## 2024-06-20 ENCOUNTER — HOSPITAL ENCOUNTER (OUTPATIENT)
Dept: INTERVENTIONAL RADIOLOGY/VASCULAR | Age: 79
Discharge: HOME | End: 2024-06-20
Payer: COMMERCIAL

## 2024-06-20 VITALS
WEIGHT: 240 LBS | DIASTOLIC BLOOD PRESSURE: 69 MMHG | HEART RATE: 88 BPM | HEIGHT: 68 IN | BODY MASS INDEX: 36.37 KG/M2 | RESPIRATION RATE: 16 BRPM | OXYGEN SATURATION: 95 % | SYSTOLIC BLOOD PRESSURE: 130 MMHG

## 2024-06-20 DIAGNOSIS — R18.8 OTHER ASCITES: ICD-10-CM

## 2024-06-20 LAB
APTT: 32.1 SECONDS (ref 25.1–37.1)
FLUID TYPE: NORMAL INDEX
HCT VFR BLD CALC: 31.2 % (ref 42–52)
HEMOGLOBIN: 9.8 GM/DL (ref 13.5–18)
INR BLD: 1.1 INDEX
LYMPHOCYTES, BODY FLUID: 36 %
MCH RBC QN AUTO: 29.6 PG (ref 27–31)
MCHC RBC AUTO-ENTMCNC: 31.4 % (ref 32–36)
MCV RBC AUTO: 94.3 FL (ref 78–100)
MESOTHELIAL FLUID: 4 /100 WBC
MONOCYTE, FLUID: 48 %
NEUTROPHIL, FLUID: 16 %
OTHER CELLS FLUID: 0
PDW BLD-RTO: 15.9 % (ref 11.7–14.9)
PLATELET # BLD: 225 K/CU MM (ref 140–440)
PMV BLD AUTO: 9.9 FL (ref 7.5–11.1)
PROTHROMBIN TIME: 14.7 SECONDS (ref 11.7–14.5)
RBC # BLD: 3.31 M/CU MM (ref 4.6–6.2)
RBC FLUID: <2000 /CU MM
WBC # BLD: 6.8 K/CU MM (ref 4–10.5)
WBC FLUID: 140 /CU MM

## 2024-06-20 PROCEDURE — 2500000003 HC RX 250 WO HCPCS: Performed by: RADIOLOGY

## 2024-06-20 PROCEDURE — 85730 THROMBOPLASTIN TIME PARTIAL: CPT

## 2024-06-20 PROCEDURE — 85610 PROTHROMBIN TIME: CPT

## 2024-06-20 PROCEDURE — 6360000002 HC RX W HCPCS: Performed by: RADIOLOGY

## 2024-06-20 PROCEDURE — 89051 BODY FLUID CELL COUNT: CPT

## 2024-06-20 PROCEDURE — 85027 COMPLETE CBC AUTOMATED: CPT

## 2024-06-20 PROCEDURE — P9047 ALBUMIN (HUMAN), 25%, 50ML: HCPCS | Performed by: RADIOLOGY

## 2024-06-20 PROCEDURE — C1729 CATH, DRAINAGE: HCPCS

## 2024-06-20 PROCEDURE — P9047 ALBUMIN (HUMAN), 25%, 50ML: HCPCS

## 2024-06-20 PROCEDURE — 7100000010 HC PHASE II RECOVERY - FIRST 15 MIN

## 2024-06-20 PROCEDURE — 6360000002 HC RX W HCPCS

## 2024-06-20 PROCEDURE — 87070 CULTURE OTHR SPECIMN AEROBIC: CPT

## 2024-06-20 PROCEDURE — 87205 SMEAR GRAM STAIN: CPT

## 2024-06-20 PROCEDURE — 49083 ABD PARACENTESIS W/IMAGING: CPT

## 2024-06-20 RX ORDER — ALBUMIN (HUMAN) 12.5 G/50ML
SOLUTION INTRAVENOUS CONTINUOUS PRN
Status: COMPLETED | OUTPATIENT
Start: 2024-06-20 | End: 2024-06-20

## 2024-06-20 RX ORDER — SODIUM CHLORIDE 0.9 % (FLUSH) 0.9 %
10 SYRINGE (ML) INJECTION PRN
Status: DISCONTINUED | OUTPATIENT
Start: 2024-06-20 | End: 2024-06-21 | Stop reason: HOSPADM

## 2024-06-20 RX ORDER — LIDOCAINE HYDROCHLORIDE 10 MG/ML
INJECTION, SOLUTION EPIDURAL; INFILTRATION; INTRACAUDAL; PERINEURAL PRN
Status: COMPLETED | OUTPATIENT
Start: 2024-06-20 | End: 2024-06-20

## 2024-06-20 RX ADMIN — LIDOCAINE HYDROCHLORIDE 5 ML: 10 INJECTION, SOLUTION EPIDURAL; INFILTRATION; INTRACAUDAL; PERINEURAL at 11:34

## 2024-06-20 RX ADMIN — ALBUMIN (HUMAN) 37.5 G: 0.25 INJECTION, SOLUTION INTRAVENOUS at 11:56

## 2024-06-20 ASSESSMENT — PAIN - FUNCTIONAL ASSESSMENT: PAIN_FUNCTIONAL_ASSESSMENT: NONE - DENIES PAIN

## 2024-06-20 NOTE — BRIEF OP NOTE
Brief Postoperative Note      Patient: Boby Glasgow  YOB: 1945  MRN: [id number]    Date of Procedure: 6/20/2024    Successful ultrasound guided paracentesis  Electronically signed by Jennifer Fitch MD on 6/20/2024 at 5:06 PM

## 2024-06-20 NOTE — PROGRESS NOTES
Pt. returned from IR paracentesis- tolerated well. Requested ice chips. Preparing for discharge soon. Vitals are stable. ABD dressing is clean, dry, intact. IV removed. Pt. Denies pain or further needs.

## 2024-06-20 NOTE — PROGRESS NOTES
Pt to pre op holding for paracentesis, VSS, alert and oriented x 4, IV in place, labs in process.

## 2024-06-20 NOTE — PROGRESS NOTES
TRANSFER - OUT REPORT:    Verbal report given to Humaira on Boby Glasgow being transferred to [facility] for routine progression of patient care       Report consisted of patient's Situation, Background, Assessment and   Recommendations(SBAR).     Information from the following report(s) Nurse Handoff Report was reviewed with the receiving nurse.    Paracentesis complete. 6L drained. Albumin replaced. Vitals WNL. Specimens sent.     Opportunity for questions and clarification was provided.      Patient transported with:   Registered Nurse

## 2024-06-23 LAB
CULTURE: ABNORMAL
GRAM SMEAR: ABNORMAL
Lab: ABNORMAL
SPECIMEN: ABNORMAL

## 2024-06-26 NOTE — PROGRESS NOTES
IR Procedure at [facility]:  left message for patient to arrive at 0900 at [facility] on 7/3/2024 for his procedure at 1000. Patient has been given home instructions and knows to take his medications as scheduled.    NPO at Midnight      Follow your directions as prescribed by the doctor for your procedure and medications.  3.   Consult your provider as to when to stop blood thinner  4.   Do not take any pain medication within 6 hours of your procedure  5.   Do not drink any alcoholic beverages or use any street drugs 24 hours before procedure.  6.   Please wear simple, loose fitting clothing to the hospital.  Do not bring valuables (money,             credit cards, checkbooks, etc.)     7.   If you  have a Living Will and Durable Power of  for Healthcare, please bring in a copy.  8.   Please bring picture ID,  insurance card, paperwork from the doctors office            (H & P, Consent,  & card for implantable devices).  9.   Report to the information desk on the ground floor.  10. Take a shower the night before or morning of your procedure, do not apply any lotion, oil or powder.  11. If you are going to be sedated for the procedure, you will need a responsible adult to drive you home.

## 2024-06-27 ENCOUNTER — HOSPITAL ENCOUNTER (OUTPATIENT)
Dept: INTERVENTIONAL RADIOLOGY/VASCULAR | Age: 79
Discharge: HOME | End: 2024-06-27
Payer: COMMERCIAL

## 2024-06-27 VITALS — OXYGEN SATURATION: 98 % | HEART RATE: 86 BPM | SYSTOLIC BLOOD PRESSURE: 143 MMHG | DIASTOLIC BLOOD PRESSURE: 63 MMHG

## 2024-06-27 DIAGNOSIS — R18.8 OTHER ASCITES: ICD-10-CM

## 2024-06-27 LAB
FLUID TYPE: NORMAL INDEX
LYMPHOCYTES, BODY FLUID: 24 %
MESOTHELIAL FLUID: 1 /100 WBC
MONOCYTE, FLUID: 68 %
NEUTROPHIL, FLUID: 7 %
RBC FLUID: <2000 /CU MM
WBC FLUID: 150 /CU MM

## 2024-06-27 PROCEDURE — 49083 ABD PARACENTESIS W/IMAGING: CPT

## 2024-06-27 PROCEDURE — 87205 SMEAR GRAM STAIN: CPT

## 2024-06-27 PROCEDURE — P9047 ALBUMIN (HUMAN), 25%, 50ML: HCPCS

## 2024-06-27 PROCEDURE — 7100000010 HC PHASE II RECOVERY - FIRST 15 MIN

## 2024-06-27 PROCEDURE — 89051 BODY FLUID CELL COUNT: CPT

## 2024-06-27 PROCEDURE — 6360000002 HC RX W HCPCS: Performed by: RADIOLOGY

## 2024-06-27 PROCEDURE — 2500000003 HC RX 250 WO HCPCS: Performed by: RADIOLOGY

## 2024-06-27 PROCEDURE — 2709999900 IR US GUIDED PARACENTESIS

## 2024-06-27 PROCEDURE — 6360000002 HC RX W HCPCS

## 2024-06-27 PROCEDURE — 87070 CULTURE OTHR SPECIMN AEROBIC: CPT

## 2024-06-27 PROCEDURE — P9047 ALBUMIN (HUMAN), 25%, 50ML: HCPCS | Performed by: RADIOLOGY

## 2024-06-27 RX ORDER — ALBUMIN (HUMAN) 12.5 G/50ML
SOLUTION INTRAVENOUS CONTINUOUS PRN
Status: COMPLETED | OUTPATIENT
Start: 2024-06-27 | End: 2024-06-27

## 2024-06-27 RX ORDER — LIDOCAINE HYDROCHLORIDE 10 MG/ML
INJECTION, SOLUTION EPIDURAL; INFILTRATION; INTRACAUDAL; PERINEURAL PRN
Status: COMPLETED | OUTPATIENT
Start: 2024-06-27 | End: 2024-06-27

## 2024-06-27 RX ADMIN — LIDOCAINE HYDROCHLORIDE 10 ML: 10 INJECTION, SOLUTION EPIDURAL; INFILTRATION; INTRACAUDAL; PERINEURAL at 12:16

## 2024-06-27 RX ADMIN — ALBUMIN (HUMAN) 42 G: 0.25 INJECTION, SOLUTION INTRAVENOUS at 12:47

## 2024-06-27 NOTE — OP NOTE
Operative Note      Patient: Boby Glasgow  YOB: 1945  MRN: [id number]    Date of Procedure: 6/27/2024    * No Diagnosis Codes entered *    Post-Op Diagnosis:  ascites       * No procedures listed *    * No surgeons listed *    Assistant:   * No surgical staff found *    Anesthesia: * No anesthesia type entered *    Estimated Blood Loss (mL): none    Complications: None    Specimens:   * No specimens in log *    Implants:  * No implants in log *      Drains: * No LDAs found *    Findings:  Infection Present At Time Of Surgery (PATOS) (choose all levels that have infection present):  No infection present  Other Findings:     Detailed Description of Procedure:   Under US guidance paracentesis performed draining 6800 ml    Electronically signed by Kush Phillips MD on 6/27/2024 at 12:23 PM

## 2024-06-27 NOTE — PROGRESS NOTES
TRANSFER - OUT REPORT: 6800 ml of cloudy yellow ascitic fluid removed, tolerated procedure well, VSS, denies pain, dressing clean dry and intact.     Verbal report given to Marj on Boby Glasgow      Report consisted of patient's Situation, Background, Assessment and   Recommendations(SBAR).     Information from the following report(s) Nurse Handoff Report was reviewed with the receiving nurse.    Opportunity for questions and clarification was provided.      Patient transported with:   Registered Nurse

## 2024-06-30 LAB
CULTURE: ABNORMAL
GRAM SMEAR: ABNORMAL
Lab: ABNORMAL
SPECIMEN: ABNORMAL

## 2024-07-01 RX ORDER — SPIRONOLACTONE 100 MG/1
100 TABLET, FILM COATED ORAL 2 TIMES DAILY
Qty: 60 TABLET | Refills: 0 | Status: SHIPPED | OUTPATIENT
Start: 2024-07-01

## 2024-07-01 NOTE — PROGRESS NOTES
IR Procedure at [facility]:  Left message on patient's wife Gaby's phone and he will arrive at 0900 at [facility] on 7/11/2024 for his procedure at 1000. Patient has been give below instructions and knows to take his medications as scheduled.    NPO at Midnight      Follow your directions as prescribed by the doctor for your procedure and medications.  3.   Consult your provider as to when to stop blood thinner  4.   Do not take any pain medication within 6 hours of your procedure  5.   Do not drink any alcoholic beverages or use any street drugs 24 hours before procedure.  6.   Please wear simple, loose fitting clothing to the hospital.  Do not bring valuables (money,             credit cards, checkbooks, etc.)     7.   If you  have a Living Will and Durable Power of  for Healthcare, please bring in a copy.  8.   Please bring picture ID,  insurance card, paperwork from the doctors office            (H & P, Consent,  & card for implantable devices).  9.   Report to the information desk on the ground floor.  10. Take a shower the night before or morning of your procedure, do not apply any lotion, oil or powder.  11. If you are going to be sedated for the procedure, you will need a responsible adult to drive you home.

## 2024-07-02 RX ORDER — SODIUM CHLORIDE 0.9 % (FLUSH) 0.9 %
10 SYRINGE (ML) INJECTION PRN
OUTPATIENT
Start: 2024-07-02

## 2024-07-03 ENCOUNTER — HOSPITAL ENCOUNTER (OUTPATIENT)
Dept: INTERVENTIONAL RADIOLOGY/VASCULAR | Age: 79
Discharge: HOME | End: 2024-07-03

## 2024-07-11 ENCOUNTER — HOSPITAL ENCOUNTER (OUTPATIENT)
Dept: INTERVENTIONAL RADIOLOGY/VASCULAR | Age: 79
Discharge: HOME | End: 2024-07-11
Payer: COMMERCIAL

## 2024-07-11 VITALS — DIASTOLIC BLOOD PRESSURE: 74 MMHG | SYSTOLIC BLOOD PRESSURE: 145 MMHG | OXYGEN SATURATION: 96 % | HEART RATE: 80 BPM

## 2024-07-11 DIAGNOSIS — K70.31 ASCITES DUE TO ALCOHOLIC CIRRHOSIS (HCC): ICD-10-CM

## 2024-07-11 LAB
FLUID TYPE: NORMAL INDEX
GLUCOSE, FLUID: 111 MG/DL
LACTATE DEHYDROGENASE, FLUID: 45 IU/L
LYMPHOCYTES, BODY FLUID: 23 %
MESOTHELIAL FLUID: 1 /100 WBC
MONOCYTE, FLUID: NORMAL %
NEUTROPHIL, FLUID: 3 %
PROTEIN FLUID: 1.4 G/DL
RBC FLUID: <2000 /CU MM
WBC FLUID: 112 /CU MM

## 2024-07-11 PROCEDURE — P9047 ALBUMIN (HUMAN), 25%, 50ML: HCPCS

## 2024-07-11 PROCEDURE — 83615 LACTATE (LD) (LDH) ENZYME: CPT

## 2024-07-11 PROCEDURE — 49083 ABD PARACENTESIS W/IMAGING: CPT

## 2024-07-11 PROCEDURE — 89051 BODY FLUID CELL COUNT: CPT

## 2024-07-11 PROCEDURE — 84157 ASSAY OF PROTEIN OTHER: CPT

## 2024-07-11 PROCEDURE — 7100000010 HC PHASE II RECOVERY - FIRST 15 MIN

## 2024-07-11 PROCEDURE — 6360000002 HC RX W HCPCS: Performed by: RADIOLOGY

## 2024-07-11 PROCEDURE — P9047 ALBUMIN (HUMAN), 25%, 50ML: HCPCS | Performed by: RADIOLOGY

## 2024-07-11 PROCEDURE — 2500000003 HC RX 250 WO HCPCS: Performed by: RADIOLOGY

## 2024-07-11 PROCEDURE — 2709999900 IR US GUIDED PARACENTESIS

## 2024-07-11 PROCEDURE — 6360000002 HC RX W HCPCS

## 2024-07-11 PROCEDURE — 82945 GLUCOSE OTHER FLUID: CPT

## 2024-07-11 RX ORDER — SODIUM CHLORIDE 0.9 % (FLUSH) 0.9 %
10 SYRINGE (ML) INJECTION PRN
Status: DISCONTINUED | OUTPATIENT
Start: 2024-07-11 | End: 2024-07-12 | Stop reason: HOSPADM

## 2024-07-11 RX ORDER — ALBUMIN (HUMAN) 12.5 G/50ML
SOLUTION INTRAVENOUS CONTINUOUS PRN
Status: DISCONTINUED | OUTPATIENT
Start: 2024-07-11 | End: 2024-07-12 | Stop reason: HOSPADM

## 2024-07-11 RX ORDER — LIDOCAINE HYDROCHLORIDE AND EPINEPHRINE BITARTRATE 20; .01 MG/ML; MG/ML
INJECTION, SOLUTION SUBCUTANEOUS PRN
Status: DISCONTINUED | OUTPATIENT
Start: 2024-07-11 | End: 2024-07-12 | Stop reason: HOSPADM

## 2024-07-11 RX ORDER — LIDOCAINE HYDROCHLORIDE 10 MG/ML
INJECTION, SOLUTION EPIDURAL; INFILTRATION; INTRACAUDAL; PERINEURAL PRN
Status: DISCONTINUED | OUTPATIENT
Start: 2024-07-11 | End: 2024-07-12 | Stop reason: HOSPADM

## 2024-07-11 RX ADMIN — ALBUMIN (HUMAN) 50 G: 0.25 INJECTION, SOLUTION INTRAVENOUS at 10:39

## 2024-07-11 RX ADMIN — LIDOCAINE HYDROCHLORIDE,EPINEPHRINE BITARTRATE 7 ML: 20; .01 INJECTION, SOLUTION INFILTRATION; PERINEURAL at 10:17

## 2024-07-11 RX ADMIN — LIDOCAINE HYDROCHLORIDE 17 ML: 10 INJECTION, SOLUTION EPIDURAL; INFILTRATION; INTRACAUDAL; PERINEURAL at 10:17

## 2024-07-11 NOTE — PROGRESS NOTES
1049 pt arrived back from his procedure to [facility], pt resting in bed with call light in reach. Dressing wnl with no bleeding noted

## 2024-07-11 NOTE — PROGRESS NOTES
IR Procedure at [facility]:  Unable to leave message on cell and home phones. I will try again later.ADDENDUM: still unable to contact patient, I will check with scheduling to see if they have another number.ADDENDUM: I left a message on patient's daughter Opal's phone, with an arrival time of 0900 for his procedure at 1000 on the 18th, also went over below instructions and told her patient can take his medications as scheduled. ADDENDUM: Still unable to reach patient.    NPO at Midnight      Follow your directions as prescribed by the doctor for your procedure and medications.  3.   Consult your provider as to when to stop blood thinner  4.   Do not take any pain medication within 6 hours of your procedure  5.   Do not drink any alcoholic beverages or use any street drugs 24 hours before procedure.  6.   Please wear simple, loose fitting clothing to the hospital.  Do not bring valuables (money,             credit cards, checkbooks, etc.)     7.   If you  have a Living Will and Durable Power of  for Healthcare, please bring in a copy.  8.   Please bring picture ID,  insurance card, paperwork from the doctors office            (H & P, Consent,  & card for implantable devices).  9.   Report to the information desk on the ground floor.  10. Take a shower the night before or morning of your procedure, do not apply any lotion, oil or powder.  11. If you are going to be sedated for the procedure, you will need a responsible adult to drive you home.

## 2024-07-11 NOTE — PROGRESS NOTES
TRANSFER - OUT REPORT:    Verbal report given to JANNETH Chawla on Boby Glasgow being transferred to IR Holding for routine post-op.  6050 cc yellow ascitic fluid removed.        Report consisted of patient's Situation, Background, Assessment and   Recommendations(SBAR).     Information from the following report(s) Nurse Handoff Report was reviewed with the receiving nurse.    Opportunity for questions and clarification was provided.      Patient transported with:   Registered Nurse

## 2024-07-15 NOTE — PROGRESS NOTES
IR Procedure at [facility]: Patient's phone numbers are not accepting calls at this time. I will try again tomorrow. ADDENDUM: still unable to reach patient for PAT.ADDENDUM: Still not able to reach patient.    NPO at Midnight      Follow your directions as prescribed by the doctor for your procedure and medications.  3.   Consult your provider as to when to stop blood thinner  4.   Do not take any pain medication within 6 hours of your procedure  5.   Do not drink any alcoholic beverages or use any street drugs 24 hours before procedure.  6.   Please wear simple, loose fitting clothing to the hospital.  Do not bring valuables (money,             credit cards, checkbooks, etc.)     7.   If you  have a Living Will and Durable Power of  for Healthcare, please bring in a copy.  8.   Please bring picture ID,  insurance card, paperwork from the doctors office            (H & P, Consent,  & card for implantable devices).  9.   Report to the information desk on the ground floor.  10. Take a shower the night before or morning of your procedure, do not apply any lotion, oil or powder.  11. If you are going to be sedated for the procedure, you will need a responsible adult to drive you home.

## 2024-07-18 ENCOUNTER — HOSPITAL ENCOUNTER (OUTPATIENT)
Dept: INTERVENTIONAL RADIOLOGY/VASCULAR | Age: 79
Discharge: HOME | End: 2024-07-18
Payer: COMMERCIAL

## 2024-07-18 VITALS
DIASTOLIC BLOOD PRESSURE: 79 MMHG | HEIGHT: 68 IN | WEIGHT: 240 LBS | BODY MASS INDEX: 36.37 KG/M2 | HEART RATE: 95 BPM | RESPIRATION RATE: 18 BRPM | SYSTOLIC BLOOD PRESSURE: 142 MMHG | OXYGEN SATURATION: 97 %

## 2024-07-18 DIAGNOSIS — K70.31 ASCITES DUE TO ALCOHOLIC CIRRHOSIS (HCC): ICD-10-CM

## 2024-07-18 LAB
ALBUMIN FLUID: 0.6 GM/DL
FLUID TYPE: NORMAL INDEX
LYMPHOCYTES, BODY FLUID: 18 %
MESOTHELIAL FLUID: 3 /100 WBC
MONOCYTE, FLUID: 78 %
NEUTROPHIL, FLUID: 4 %
OTHER CELLS FLUID: 0
PROTEIN FLUID: 1.5 GM/DL
RBC FLUID: <2000 /CU MM
SOURCE FLUID: NORMAL
WBC FLUID: 146 /CU MM

## 2024-07-18 PROCEDURE — 84157 ASSAY OF PROTEIN OTHER: CPT

## 2024-07-18 PROCEDURE — 2709999900 IR US GUIDED PARACENTESIS

## 2024-07-18 PROCEDURE — 82042 OTHER SOURCE ALBUMIN QUAN EA: CPT

## 2024-07-18 PROCEDURE — 87205 SMEAR GRAM STAIN: CPT

## 2024-07-18 PROCEDURE — 2500000003 HC RX 250 WO HCPCS: Performed by: RADIOLOGY

## 2024-07-18 PROCEDURE — 49083 ABD PARACENTESIS W/IMAGING: CPT

## 2024-07-18 PROCEDURE — 7100000010 HC PHASE II RECOVERY - FIRST 15 MIN

## 2024-07-18 PROCEDURE — 87070 CULTURE OTHR SPECIMN AEROBIC: CPT

## 2024-07-18 PROCEDURE — 6360000002 HC RX W HCPCS

## 2024-07-18 PROCEDURE — 89051 BODY FLUID CELL COUNT: CPT

## 2024-07-18 PROCEDURE — P9047 ALBUMIN (HUMAN), 25%, 50ML: HCPCS

## 2024-07-18 RX ORDER — LIDOCAINE HYDROCHLORIDE 10 MG/ML
INJECTION, SOLUTION EPIDURAL; INFILTRATION; INTRACAUDAL; PERINEURAL PRN
Status: COMPLETED | OUTPATIENT
Start: 2024-07-18 | End: 2024-07-18

## 2024-07-18 RX ORDER — ALBUMIN (HUMAN) 12.5 G/50ML
50 SOLUTION INTRAVENOUS ONCE
Status: DISCONTINUED | OUTPATIENT
Start: 2024-07-18 | End: 2024-07-19 | Stop reason: HOSPADM

## 2024-07-18 RX ADMIN — LIDOCAINE HYDROCHLORIDE 8 ML: 10 INJECTION, SOLUTION EPIDURAL; INFILTRATION; INTRACAUDAL; PERINEURAL at 08:45

## 2024-07-18 ASSESSMENT — PAIN - FUNCTIONAL ASSESSMENT: PAIN_FUNCTIONAL_ASSESSMENT: NONE - DENIES PAIN

## 2024-07-18 NOTE — PROCEDURES
PROCEDURE NOTE  Date: 7/18/2024   Name: Boby Glasgow  YOB: 1945    Procedures Successful ultrasound guided paracentesis

## 2024-07-18 NOTE — PROGRESS NOTES
TRANSFER - OUT REPORT:    Verbal report given to Natalia RN/Humaira RN on Boby Glasgow being transferred to IR holding for routine post-op       Report consisted of patient's Situation, Background, Assessment and   Recommendations(SBAR).     Information from the following report(s) Nurse Handoff Report was reviewed with the receiving nurse.    Opportunity for questions and clarification was provided.      Patient transported with:   Registered Nurse    7300cc of milky white fluid was drained. 1000cc was sent to lab per order. 50g aof albumin was given per order.

## 2024-07-18 NOTE — H&P
*  Resolved Problems:    * No resolved hospital problems. *      Ascites    Mallampati Score 2  ASA class 2    PLAN OF CARE/PLANNED PROCEDURE    IR US GUIDED PARACENTESIS W IMAGING [22333]

## 2024-07-21 LAB
CULTURE: ABNORMAL
GRAM SMEAR: ABNORMAL
Lab: ABNORMAL
SPECIMEN: ABNORMAL

## 2024-07-23 DIAGNOSIS — E03.9 ACQUIRED HYPOTHYROIDISM: ICD-10-CM

## 2024-07-23 RX ORDER — LEVOTHYROXINE SODIUM 0.05 MG/1
50 TABLET ORAL DAILY
Qty: 90 TABLET | Refills: 1 | Status: SHIPPED | OUTPATIENT
Start: 2024-07-23

## 2024-07-25 ENCOUNTER — HOSPITAL ENCOUNTER (OUTPATIENT)
Dept: INTERVENTIONAL RADIOLOGY/VASCULAR | Age: 79
Discharge: HOME | End: 2024-07-25
Payer: COMMERCIAL

## 2024-07-25 VITALS
SYSTOLIC BLOOD PRESSURE: 156 MMHG | OXYGEN SATURATION: 98 % | HEART RATE: 93 BPM | RESPIRATION RATE: 16 BRPM | DIASTOLIC BLOOD PRESSURE: 81 MMHG | TEMPERATURE: 98.5 F

## 2024-07-25 DIAGNOSIS — R18.8 OTHER ASCITES: ICD-10-CM

## 2024-07-25 LAB
FLUID TYPE: NORMAL INDEX
GLUCOSE, FLUID: 126 MG/DL
HCT VFR BLD CALC: 28.3 % (ref 42–52)
HEMOGLOBIN: 8.9 GM/DL (ref 13.5–18)
INR BLD: 1.1 INDEX
LACTATE DEHYDROGENASE, FLUID: 50 IU/L
LYMPHOCYTES, BODY FLUID: 34 %
MCH RBC QN AUTO: 29.6 PG (ref 27–31)
MCHC RBC AUTO-ENTMCNC: 31.4 % (ref 32–36)
MCV RBC AUTO: 94 FL (ref 78–100)
MESOTHELIAL FLUID: 5 /100 WBC
MONOCYTE, FLUID: 60 %
NEUTROPHIL, FLUID: 6 %
OTHER CELLS FLUID: 0
PDW BLD-RTO: 16.1 % (ref 11.7–14.9)
PLATELET # BLD: 227 K/CU MM (ref 140–440)
PMV BLD AUTO: 9.5 FL (ref 7.5–11.1)
PROTEIN FLUID: 1.5 G/DL
PROTHROMBIN TIME: 14.2 SECONDS (ref 11.7–14.5)
RBC # BLD: 3.01 M/CU MM (ref 4.6–6.2)
RBC FLUID: <2000 /CU MM
WBC # BLD: 5.8 K/CU MM (ref 4–10.5)
WBC FLUID: 134 /CU MM

## 2024-07-25 PROCEDURE — 89051 BODY FLUID CELL COUNT: CPT

## 2024-07-25 PROCEDURE — C1729 CATH, DRAINAGE: HCPCS

## 2024-07-25 PROCEDURE — 2500000003 HC RX 250 WO HCPCS: Performed by: RADIOLOGY

## 2024-07-25 PROCEDURE — 49083 ABD PARACENTESIS W/IMAGING: CPT

## 2024-07-25 PROCEDURE — 83615 LACTATE (LD) (LDH) ENZYME: CPT

## 2024-07-25 PROCEDURE — 85610 PROTHROMBIN TIME: CPT

## 2024-07-25 PROCEDURE — 6360000002 HC RX W HCPCS: Performed by: RADIOLOGY

## 2024-07-25 PROCEDURE — P9047 ALBUMIN (HUMAN), 25%, 50ML: HCPCS

## 2024-07-25 PROCEDURE — 84157 ASSAY OF PROTEIN OTHER: CPT

## 2024-07-25 PROCEDURE — 85027 COMPLETE CBC AUTOMATED: CPT

## 2024-07-25 PROCEDURE — 82945 GLUCOSE OTHER FLUID: CPT

## 2024-07-25 PROCEDURE — 6360000002 HC RX W HCPCS

## 2024-07-25 PROCEDURE — P9047 ALBUMIN (HUMAN), 25%, 50ML: HCPCS | Performed by: RADIOLOGY

## 2024-07-25 PROCEDURE — 7100000010 HC PHASE II RECOVERY - FIRST 15 MIN

## 2024-07-25 PROCEDURE — 2500000003 HC RX 250 WO HCPCS

## 2024-07-25 RX ORDER — ALBUMIN (HUMAN) 12.5 G/50ML
SOLUTION INTRAVENOUS CONTINUOUS PRN
Status: COMPLETED | OUTPATIENT
Start: 2024-07-25 | End: 2024-07-25

## 2024-07-25 RX ORDER — LIDOCAINE HYDROCHLORIDE AND EPINEPHRINE BITARTRATE 20; .01 MG/ML; MG/ML
INJECTION, SOLUTION SUBCUTANEOUS PRN
Status: COMPLETED | OUTPATIENT
Start: 2024-07-25 | End: 2024-07-25

## 2024-07-25 RX ADMIN — LIDOCAINE HYDROCHLORIDE,EPINEPHRINE BITARTRATE 8 ML: 20; .01 INJECTION, SOLUTION INFILTRATION; PERINEURAL at 10:30

## 2024-07-25 RX ADMIN — ALBUMIN (HUMAN) 25 G: 0.25 INJECTION, SOLUTION INTRAVENOUS at 10:53

## 2024-07-25 NOTE — PROGRESS NOTES
TRANSFER - OUT REPORT:    Verbal report given to Marj on Boby Glasgow being transferred to IR holding  for routine progression of patient care       Report consisted of patient's Situation, Background, Assessment and   Recommendations(SBAR).     Information from the following report(s) Nurse Handoff Report was reviewed with the receiving nurse.    4450 mL drained during para. Fluid panel sent. Albumin replaced.     Opportunity for questions and clarification was provided.      Patient transported with:   Registered Nurse

## 2024-07-25 NOTE — PROGRESS NOTES
Pt returned to cath holding #9 after paracentesis with Dr. Patel. Tolerated procedure well. ABD dressing is clean, dry, intact. Preparing for discharge.

## 2024-07-25 NOTE — PRE SEDATION
Sedation Pre-Procedure Note    Patient Name: Boby Glasgow   YOB: 1945  Room/Bed: Room/bed info not found  Medical Record Number: [id number]  Date: 7/25/2024   Time: 10:32 AM       Indication:  ascites    Consent: I have discussed with the patient and/or the patient representative the indication, alternatives, and the possible risks and/or complications of the planned procedure and the anesthesia methods. The patient and/or patient representative appear to understand and agree to proceed.    Vital Signs:   Vitals:    07/25/24 1027   BP: (!) 154/78   Pulse: 95   Resp: 16   Temp:    SpO2: 98%       Past Medical History:   has a past medical history of Acute bacterial endocarditis, Anxiety, Chronic heart failure, unspecified heart failure type (HCC), Chronic heart failure, unspecified heart failure type (HCC), Cirrhosis, alcoholic (HCC), Diabetes mellitus (HCC), GERD (gastroesophageal reflux disease), GERD (gastroesophageal reflux disease), Hyperlipidemia, Hypertension, Meniere's disease, Portal hypertension (HCC), Pulmonary nodules, Secondary esophageal varices without bleeding (HCC), Sleep apnea, SOB (shortness of breath), and Thyroid disease.    Past Surgical History:   has a past surgical history that includes Cholecystectomy; Vasectomy; Colonoscopy; Endoscopy, colon, diagnostic; Foot surgery; Upper gastrointestinal endoscopy (N/A, 1/19/2022); Tonsillectomy; and Appendectomy.    Medications:   Scheduled Meds:   Continuous Infusions:   PRN Meds: lidocaine-EPINEPHrine  Home Meds:   Prior to Admission medications    Medication Sig Start Date End Date Taking? Authorizing Provider   levothyroxine (SYNTHROID) 50 MCG tablet Take 1 tablet by mouth once daily 7/23/24   Paul Baeza MD   spironolactone (ALDACTONE) 100 MG tablet Take 1 tablet by mouth twice daily 7/1/24   Paul Baeza MD   rifAXIMin (XIFAXAN) 550 MG tablet Take 1 tablet by mouth 2 times daily 6/28/24   Travis Phelan MD

## 2024-07-30 NOTE — PROGRESS NOTES
IR Procedure at [facility]:  Unable to reach patient on any number, I will try again later.      Follow your directions as prescribed by the doctor for your procedure and medications.  3.   Consult your provider as to when to stop blood thinner  4.   Do not take any pain medication within 6 hours of your procedure  5.   Do not drink any alcoholic beverages or use any street drugs 24 hours before procedure.  6.   Please wear simple, loose fitting clothing to the hospital.  Do not bring valuables (money,             credit cards, checkbooks, etc.)     7.   If you  have a Living Will and Durable Power of  for Healthcare, please bring in a copy.  8.   Please bring picture ID,  insurance card, paperwork from the doctors office            (H & P, Consent,  & card for implantable devices).  9.   Report to the information desk on the ground floor.  10. Take a shower the night before or morning of your procedure, do not apply any lotion, oil or powder.  11. If you are going to be sedated for the procedure, you will need a responsible adult to drive you home.

## 2024-07-31 NOTE — PROGRESS NOTES
IR Procedure at [facility]: Left message on patient's wife Gaby's phone for patient to arrive at 0930 at [facility] on 8/1/2024 for his procedure at 1030. Also gave below instructions and patient will take medications as scheduled.      Follow your directions as prescribed by the doctor for your procedure and medications.  3.   Consult your provider as to when to stop blood thinner  4.   Do not take any pain medication within 6 hours of your procedure  5.   Do not drink any alcoholic beverages or use any street drugs 24 hours before procedure.  6.   Please wear simple, loose fitting clothing to the hospital.  Do not bring valuables (money,             credit cards, checkbooks, etc.)     7.   If you  have a Living Will and Durable Power of  for Healthcare, please bring in a copy.  8.   Please bring picture ID,  insurance card, paperwork from the doctors office            (H & P, Consent,  & card for implantable devices).  9.   Report to the information desk on the ground floor.  10. Take a shower the night before or morning of your procedure, do not apply any lotion, oil or powder.  11. If you are going to be sedated for the procedure, you will need a responsible adult to drive you home.

## 2024-08-01 ENCOUNTER — HOSPITAL ENCOUNTER (OUTPATIENT)
Dept: INTERVENTIONAL RADIOLOGY/VASCULAR | Age: 79
Discharge: HOME | End: 2024-08-01
Payer: COMMERCIAL

## 2024-08-01 VITALS
HEART RATE: 82 BPM | BODY MASS INDEX: 38.65 KG/M2 | OXYGEN SATURATION: 99 % | HEIGHT: 68 IN | DIASTOLIC BLOOD PRESSURE: 76 MMHG | SYSTOLIC BLOOD PRESSURE: 155 MMHG | WEIGHT: 255 LBS

## 2024-08-01 DIAGNOSIS — R18.8 OTHER ASCITES: ICD-10-CM

## 2024-08-01 LAB
ALBUMIN FLUID: 0.7 GM/DL
FLUID TYPE: NORMAL INDEX
GLUCOSE, FLUID: 110 MG/DL
LACTATE DEHYDROGENASE, FLUID: 44 IU/L
LYMPHOCYTES, BODY FLUID: 58 %
MESOTHELIAL FLUID: 2 /100 WBC
MONOCYTE, FLUID: 35 %
NEUTROPHIL, FLUID: 7 %
OTHER CELLS FLUID: 0
PROTEIN FLUID: 1.3 G/DL
RBC FLUID: <2000 /CU MM
SOURCE FLUID: NORMAL
WBC FLUID: 113 /CU MM

## 2024-08-01 PROCEDURE — P9047 ALBUMIN (HUMAN), 25%, 50ML: HCPCS

## 2024-08-01 PROCEDURE — 87205 SMEAR GRAM STAIN: CPT

## 2024-08-01 PROCEDURE — 49083 ABD PARACENTESIS W/IMAGING: CPT

## 2024-08-01 PROCEDURE — 87077 CULTURE AEROBIC IDENTIFY: CPT

## 2024-08-01 PROCEDURE — 87186 SC STD MICRODIL/AGAR DIL: CPT

## 2024-08-01 PROCEDURE — 7100000010 HC PHASE II RECOVERY - FIRST 15 MIN

## 2024-08-01 PROCEDURE — 6360000002 HC RX W HCPCS

## 2024-08-01 PROCEDURE — 84157 ASSAY OF PROTEIN OTHER: CPT

## 2024-08-01 PROCEDURE — 82042 OTHER SOURCE ALBUMIN QUAN EA: CPT

## 2024-08-01 PROCEDURE — 89051 BODY FLUID CELL COUNT: CPT

## 2024-08-01 PROCEDURE — 87070 CULTURE OTHR SPECIMN AEROBIC: CPT

## 2024-08-01 PROCEDURE — 82945 GLUCOSE OTHER FLUID: CPT

## 2024-08-01 PROCEDURE — 2500000003 HC RX 250 WO HCPCS: Performed by: RADIOLOGY

## 2024-08-01 PROCEDURE — 6360000004 HC RX CONTRAST MEDICATION

## 2024-08-01 PROCEDURE — 83615 LACTATE (LD) (LDH) ENZYME: CPT

## 2024-08-01 PROCEDURE — 2709999900 IR US GUIDED PARACENTESIS

## 2024-08-01 RX ORDER — ALBUMIN (HUMAN) 12.5 G/50ML
25 SOLUTION INTRAVENOUS ONCE
Status: COMPLETED | OUTPATIENT
Start: 2024-08-01 | End: 2024-08-01

## 2024-08-01 RX ORDER — SODIUM CHLORIDE 0.9 % (FLUSH) 0.9 %
10 SYRINGE (ML) INJECTION PRN
Status: DISCONTINUED | OUTPATIENT
Start: 2024-08-01 | End: 2024-08-02 | Stop reason: HOSPADM

## 2024-08-01 RX ORDER — LIDOCAINE HYDROCHLORIDE 10 MG/ML
INJECTION, SOLUTION EPIDURAL; INFILTRATION; INTRACAUDAL; PERINEURAL PRN
Status: COMPLETED | OUTPATIENT
Start: 2024-08-01 | End: 2024-08-01

## 2024-08-01 RX ADMIN — LIDOCAINE HYDROCHLORIDE 10 ML: 10 INJECTION, SOLUTION EPIDURAL; INFILTRATION; INTRACAUDAL; PERINEURAL at 11:00

## 2024-08-01 RX ADMIN — ALBUMIN (HUMAN) 25 G: 12.5 SOLUTION INTRAVENOUS at 11:33

## 2024-08-01 ASSESSMENT — PAIN - FUNCTIONAL ASSESSMENT: PAIN_FUNCTIONAL_ASSESSMENT: NONE - DENIES PAIN

## 2024-08-01 NOTE — PROGRESS NOTES
TRANSFER - OUT REPORT:    Verbal report given to Humaira LAGUNAS on Boby Glasgow being transferred to [facility] for routine progression of patient care       Report consisted of patient's Situation, Background, Assessment and   Recommendations(SBAR).     Information from the following report(s) Nurse Handoff Report was reviewed with the receiving nurse.    Opportunity for questions and clarification was provided.      Patient transported with:   Registered Nurse

## 2024-08-01 NOTE — OR NURSING
Pt had a paracentesis performed by Dr Contreras, with 800 cc of cloudy yellow fluid sent to the lab for testing.    A total of  4950 cc of cloudy yellow fluid was removed.

## 2024-08-01 NOTE — PROGRESS NOTES
IR Procedure at [facility]:  left message on patient's wife Gaby's phone for patient to arrive at 0930 at [facility] on 8/8/2024 for his procedure at 1030. Patient has been given below instructions and will take his medications as scheduled.    NPO at Midnight      Follow your directions as prescribed by the doctor for your procedure and medications.  3.   Consult your provider as to when to stop blood thinner  4.   Do not take any pain medication within 6 hours of your procedure  5.   Do not drink any alcoholic beverages or use any street drugs 24 hours before procedure.  6.   Please wear simple, loose fitting clothing to the hospital.  Do not bring valuables (money,             credit cards, checkbooks, etc.)     7.   If you  have a Living Will and Durable Power of  for Healthcare, please bring in a copy.  8.   Please bring picture ID,  insurance card, paperwork from the doctors office            (H & P, Consent,  & card for implantable devices).  9.   Report to the information desk on the ground floor.  10. Take a shower the night before or morning of your procedure, do not apply any lotion, oil or powder.  11. If you are going to be sedated for the procedure, you will need a responsible adult to drive you home.

## 2024-08-01 NOTE — PROGRESS NOTES
Pt to post op holding following paracentesis, VSS denies pain, dressing clean dry and intact, ok for discharge following albumin administration

## 2024-08-01 NOTE — BRIEF OP NOTE
Department of Interventional Radiology Post-Procedure Note      Date: 8/1/2024     Procedure Performed:  Paracentesis    Pre-procedure Diagnosis:  Ascites    Post-procedure Diagnosis:  Same    Sedation:  none    Contrast used:  none    Estimated blood loss:  none    Preliminary Findings:  Successful US guided paracentesis    Complications:  none    Full Report to Follow.    Electronically signed by Joesph Contreras MD on 8/1/2024 at 11:40 AM

## 2024-08-03 LAB
CULTURE: ABNORMAL
CULTURE: ABNORMAL
GRAM SMEAR: ABNORMAL
Lab: ABNORMAL
SPECIMEN: ABNORMAL

## 2024-08-08 ENCOUNTER — HOSPITAL ENCOUNTER (OUTPATIENT)
Dept: INTERVENTIONAL RADIOLOGY/VASCULAR | Age: 79
Discharge: HOME | End: 2024-08-08
Payer: COMMERCIAL

## 2024-08-08 VITALS
OXYGEN SATURATION: 98 % | WEIGHT: 256 LBS | SYSTOLIC BLOOD PRESSURE: 158 MMHG | HEIGHT: 68 IN | RESPIRATION RATE: 16 BRPM | DIASTOLIC BLOOD PRESSURE: 75 MMHG | HEART RATE: 95 BPM | BODY MASS INDEX: 38.8 KG/M2

## 2024-08-08 DIAGNOSIS — R18.8 OTHER ASCITES: ICD-10-CM

## 2024-08-08 LAB
ALBUMIN FLUID: 0.2 GM/DL
FLUID TYPE: NORMAL INDEX
LYMPHOCYTES, BODY FLUID: 59 %
MESOTHELIAL FLUID: 2 /100 WBC
MONOCYTE, FLUID: 31 %
NEUTROPHIL, FLUID: 10 %
PROTEIN FLUID: 1.5 GM/DL
RBC FLUID: <2000 /CU MM
SOURCE FLUID: NORMAL
WBC FLUID: 104 /CU MM

## 2024-08-08 PROCEDURE — 6360000002 HC RX W HCPCS

## 2024-08-08 PROCEDURE — 87205 SMEAR GRAM STAIN: CPT

## 2024-08-08 PROCEDURE — 49082 ABD PARACENTESIS: CPT

## 2024-08-08 PROCEDURE — 87070 CULTURE OTHR SPECIMN AEROBIC: CPT

## 2024-08-08 PROCEDURE — 84157 ASSAY OF PROTEIN OTHER: CPT

## 2024-08-08 PROCEDURE — 2709999900 IR US GUIDED PARACENTESIS

## 2024-08-08 PROCEDURE — 2500000003 HC RX 250 WO HCPCS: Performed by: RADIOLOGY

## 2024-08-08 PROCEDURE — P9047 ALBUMIN (HUMAN), 25%, 50ML: HCPCS

## 2024-08-08 PROCEDURE — 82042 OTHER SOURCE ALBUMIN QUAN EA: CPT

## 2024-08-08 PROCEDURE — 89051 BODY FLUID CELL COUNT: CPT

## 2024-08-08 RX ORDER — LIDOCAINE HYDROCHLORIDE 10 MG/ML
INJECTION, SOLUTION EPIDURAL; INFILTRATION; INTRACAUDAL; PERINEURAL PRN
Status: COMPLETED | OUTPATIENT
Start: 2024-08-08 | End: 2024-08-08

## 2024-08-08 RX ORDER — ALBUMIN (HUMAN) 12.5 G/50ML
62.5 SOLUTION INTRAVENOUS ONCE
Status: DISCONTINUED | OUTPATIENT
Start: 2024-08-08 | End: 2024-08-09 | Stop reason: HOSPADM

## 2024-08-08 RX ADMIN — LIDOCAINE HYDROCHLORIDE 11 ML: 10 INJECTION, SOLUTION EPIDURAL; INFILTRATION; INTRACAUDAL; PERINEURAL at 10:41

## 2024-08-08 ASSESSMENT — PAIN - FUNCTIONAL ASSESSMENT: PAIN_FUNCTIONAL_ASSESSMENT: NONE - DENIES PAIN

## 2024-08-08 NOTE — CONSULTS
Comments)     Hands drawn involuntarily       Medications:  Current Outpatient Medications on File Prior to Encounter   Medication Sig Dispense Refill    levothyroxine (SYNTHROID) 50 MCG tablet Take 1 tablet by mouth once daily 90 tablet 1    spironolactone (ALDACTONE) 100 MG tablet Take 1 tablet by mouth twice daily 60 tablet 0    rifAXIMin (XIFAXAN) 550 MG tablet Take 1 tablet by mouth 2 times daily 60 tablet 0    DULoxetine (CYMBALTA) 60 MG extended release capsule Take 1 capsule by mouth once daily 90 capsule 0    furosemide (LASIX) 40 MG tablet Take 1 tablet by mouth daily 60 tablet 3    lactulose (CHRONULAC) 10 GM/15ML solution Take 30 mLs by mouth 3 times daily Hold if having diarrhea 2700 mL 5    albuterol sulfate HFA (PROVENTIL;VENTOLIN;PROAIR) 108 (90 Base) MCG/ACT inhaler INHALE 2 PUFFS BY MOUTH 4 TIMES DAILY AS NEEDED FOR WHEEZING 1 each 2    Multiple Vitamin (MULTI VITAMIN MENS PO) Take 1 tablet by mouth daily      Omega-3 1000 MG CAPS Take 2 capsules by mouth daily       No current facility-administered medications on file prior to encounter.       Review of Systems:  A 10 point review of systems was conducted and was negative with the exception of what is noted above.     Vital Signs:  Vitals:    08/08/24 1052 08/08/24 1102 08/08/24 1112 08/08/24 1125   BP: (!) 157/76 (!) 154/69 (!) 148/69 (!) 158/75   Pulse: 94 95 96 95   Resp: 18 18 18 16   SpO2: 98% 97% 98%    Weight:       Height:            Laboratory:  No results for input(s): \"WBC\", \"NA\", \"POTASSIUM\", \"CL\", \"CO2\", \"BUN\", \"CREATININE\", \"GLUCOSE\", \"INR\", \"CKMB\" in the last 72 hours.    Invalid input(s): \"HEMOGLOBIN\", \"HEMATOCRIT\", \"PLATELETS\", \"CA\", \"PT\", \"PTT\", \"CK1\", \"TROP\"      Mallampati Score II  ASA class III    Medications reviewed: No contraindications for coagulation or sedation plans    IMAGING DATA   None available for review    ASSESSMENT AND PLAN     Pt is a good candidate for paracentesis    Anesthesia plan: Conscious sedation, will

## 2024-08-08 NOTE — PROGRESS NOTES
TRANSFER - OUT REPORT:    Verbal report given to Humaira RN and Kanwal RN on Boby Glasgow being transferred to IR holding for routine post-op       Report consisted of patient's Situation, Background, Assessment and   Recommendations(SBAR).     Information from the following report(s) Nurse Handoff Report was reviewed with the receiving nurse.    Opportunity for questions and clarification was provided.      Patient transported with:   Registered Nurse    Successful paracentesis. 8050cc of cloudy white fluid was drained. 1000cc was sent to the lab. 62.5g of albumin was given

## 2024-08-08 NOTE — PROGRESS NOTES
Pt to post op holding following paracentesis, VSS, denies pain, dressing clean dry and intact, Denies any further questions.

## 2024-08-08 NOTE — BRIEF OP NOTE
Brief Postoperative Note      Patient: Boby Glasgow  YOB: 1945  MRN: [id number]  Department of Interventional Radiology Post-Procedure Note      Date: 8/8/2024     Interventional Radiologist: Constantine    Procedure Performed:  Ultrasound guided paracentesis    H&P Status: H&P was reviewed, the patient was examined and no change has occurred in patient's condition since H&P was completed.    Pre-procedure Diagnosis:  Ascites    Post-procedure Diagnosis:  Same    Sedation:  none    Contrast used:  none    Estimated blood loss:  Minimal    Preliminary Findings:  Successful    Complications:  none    Full Report to Follow.    Electronically signed by eJnnifer Fitch MD on 8/8/2024 at 11:49 AM   Date of Procedure: 8/8/2024

## 2024-08-09 NOTE — PROGRESS NOTES
IR Procedure at [facility]:  Left message on patient's wife Gaby's phone for patient to arrive at 0930 at [facility] on 8/15/2024 for his procedure at 1030. Patient will take medications as scheduled and has been given below instructions.      Follow your directions as prescribed by the doctor for your procedure and medications.  3.   Consult your provider as to when to stop blood thinner  4.   Do not take any pain medication within 6 hours of your procedure  5.   Do not drink any alcoholic beverages or use any street drugs 24 hours before procedure.  6.   Please wear simple, loose fitting clothing to the hospital.  Do not bring valuables (money,             credit cards, checkbooks, etc.)     7.   If you  have a Living Will and Durable Power of  for Healthcare, please bring in a copy.  8.   Please bring picture ID,  insurance card, paperwork from the doctors office            (H & P, Consent,  & card for implantable devices).  9.   Report to the information desk on the ground floor.  10. Take a shower the night before or morning of your procedure, do not apply any lotion, oil or powder.

## 2024-08-11 LAB
CULTURE: ABNORMAL
GRAM SMEAR: ABNORMAL
Lab: ABNORMAL
SPECIMEN: ABNORMAL

## 2024-08-13 LAB
CULTURE: ABNORMAL
CULTURE: ABNORMAL
GRAM SMEAR: ABNORMAL
Lab: ABNORMAL
SPECIMEN: ABNORMAL

## 2024-08-15 ENCOUNTER — HOSPITAL ENCOUNTER (OUTPATIENT)
Dept: INTERVENTIONAL RADIOLOGY/VASCULAR | Age: 79
Discharge: HOME | End: 2024-08-15
Payer: COMMERCIAL

## 2024-08-15 VITALS
SYSTOLIC BLOOD PRESSURE: 137 MMHG | OXYGEN SATURATION: 98 % | RESPIRATION RATE: 15 BRPM | HEART RATE: 87 BPM | DIASTOLIC BLOOD PRESSURE: 72 MMHG

## 2024-08-15 DIAGNOSIS — R18.8 OTHER ASCITES: ICD-10-CM

## 2024-08-15 LAB
ALBUMIN FLUID: 0.7 GM/DL
AMYLASE FLUID: 13 U/L
FLUID TYPE: NORMAL INDEX
GLUCOSE, FLUID: 124 MG/DL
LACTATE DEHYDROGENASE, FLUID: 49 IU/L
LYMPHOCYTES, BODY FLUID: NORMAL %
MONOCYTE, FLUID: NORMAL %
NEUTROPHIL, FLUID: NORMAL %
PROTEIN FLUID: 1.3 GM/DL
RBC FLUID: NORMAL /CU MM
SOURCE FLUID: NORMAL
WBC FLUID: 145 /CU MM

## 2024-08-15 PROCEDURE — 2500000003 HC RX 250 WO HCPCS: Performed by: RADIOLOGY

## 2024-08-15 PROCEDURE — 87205 SMEAR GRAM STAIN: CPT

## 2024-08-15 PROCEDURE — 2709999900 IR US GUIDED PARACENTESIS

## 2024-08-15 PROCEDURE — 7100000010 HC PHASE II RECOVERY - FIRST 15 MIN

## 2024-08-15 PROCEDURE — 82150 ASSAY OF AMYLASE: CPT

## 2024-08-15 PROCEDURE — 89051 BODY FLUID CELL COUNT: CPT

## 2024-08-15 PROCEDURE — 49083 ABD PARACENTESIS W/IMAGING: CPT

## 2024-08-15 PROCEDURE — 87070 CULTURE OTHR SPECIMN AEROBIC: CPT

## 2024-08-15 PROCEDURE — 82042 OTHER SOURCE ALBUMIN QUAN EA: CPT

## 2024-08-15 PROCEDURE — 82945 GLUCOSE OTHER FLUID: CPT

## 2024-08-15 PROCEDURE — 83615 LACTATE (LD) (LDH) ENZYME: CPT

## 2024-08-15 PROCEDURE — 84157 ASSAY OF PROTEIN OTHER: CPT

## 2024-08-15 RX ORDER — LIDOCAINE HYDROCHLORIDE 10 MG/ML
INJECTION, SOLUTION EPIDURAL; INFILTRATION; INTRACAUDAL; PERINEURAL PRN
Status: COMPLETED | OUTPATIENT
Start: 2024-08-15 | End: 2024-08-15

## 2024-08-15 RX ORDER — SODIUM CHLORIDE 0.9 % (FLUSH) 0.9 %
10 SYRINGE (ML) INJECTION PRN
Status: DISCONTINUED | OUTPATIENT
Start: 2024-08-15 | End: 2024-08-16 | Stop reason: HOSPADM

## 2024-08-15 RX ADMIN — LIDOCAINE HYDROCHLORIDE 10 ML: 10 INJECTION, SOLUTION EPIDURAL; INFILTRATION; INTRACAUDAL; PERINEURAL at 11:18

## 2024-08-15 NOTE — PROGRESS NOTES
IR Procedure at [facility]: Left message on patient's wife Gaby's phone for patient to arrive at 0930 at [facility] on 8/20/2024 for his procedure at 1030. Patient has been given below instructions and knows to take his medications as scheduled.    NPO at Midnight      Follow your directions as prescribed by the doctor for your procedure and medications.  3.   Consult your provider as to when to stop blood thinner  4.   Do not take any pain medication within 6 hours of your procedure  5.   Do not drink any alcoholic beverages or use any street drugs 24 hours before procedure.  6.   Please wear simple, loose fitting clothing to the hospital.  Do not bring valuables (money,             credit cards, checkbooks, etc.)     7.   If you  have a Living Will and Durable Power of  for Healthcare, please bring in a copy.  8.   Please bring picture ID,  insurance card, paperwork from the doctors office            (H & P, Consent,  & card for implantable devices).  9.   Report to the information desk on the ground floor.  10. Take a shower the night before or morning of your procedure, do not apply any lotion, oil or powder.

## 2024-08-15 NOTE — PROGRESS NOTES
TRANSFER - OUT REPORT:    Verbal report given to Cammy and Mili RNs on Boby Glasgow being transferred back to [facility] room #7 for routine post-op       Report consisted of patient's Situation, Background, Assessment and   Recommendations(SBAR).     Successful Paracentesis in IR with Dr. Contreras at this time.   8,960 cc pink tinged, cloudy fluid, removed from right lower ABD. 1,000 ccs sent to lab for analysis.  Albumin 50g infused. Tolerated procedure well. Vitals stable. May discharge upon arrival back to IR holding.        Information from the following report(s) Nurse Handoff Report was reviewed with the receiving nurse.    Opportunity for questions and clarification was provided.      Patient transported with:   Registered Nurse

## 2024-08-15 NOTE — BRIEF OP NOTE
Department of Interventional Radiology Post-Procedure Note      Date: 8/15/2024     Procedure Performed:  paracentesis    Pre-procedure Diagnosis:  tense ascites    Post-procedure Diagnosis:  Same    Sedation:  none    Contrast used:  None    Estimated blood loss:  Minimal    Preliminary Findings:  Successful US guided paracentesis    Complications:  none    Full Report to Follow.    Electronically signed by Joesph Contreras MD on 8/15/2024 at 11:17 AM

## 2024-08-18 LAB
CULTURE: ABNORMAL
GRAM SMEAR: ABNORMAL
Lab: ABNORMAL
SPECIMEN: ABNORMAL

## 2024-08-20 ENCOUNTER — HOSPITAL ENCOUNTER (OUTPATIENT)
Dept: INTERVENTIONAL RADIOLOGY/VASCULAR | Age: 79
Discharge: HOME | End: 2024-08-20
Payer: COMMERCIAL

## 2024-08-20 VITALS
SYSTOLIC BLOOD PRESSURE: 135 MMHG | HEART RATE: 88 BPM | RESPIRATION RATE: 18 BRPM | DIASTOLIC BLOOD PRESSURE: 84 MMHG | OXYGEN SATURATION: 96 %

## 2024-08-20 DIAGNOSIS — R18.8 OTHER ASCITES: ICD-10-CM

## 2024-08-20 LAB
ALBUMIN FLUID: 0.7 GM/DL
PROTEIN FLUID: 1.3 GM/DL
SOURCE FLUID: NORMAL

## 2024-08-20 PROCEDURE — C1729 CATH, DRAINAGE: HCPCS

## 2024-08-20 PROCEDURE — 49083 ABD PARACENTESIS W/IMAGING: CPT | Performed by: RADIOLOGY

## 2024-08-20 PROCEDURE — 2500000003 HC RX 250 WO HCPCS: Performed by: RADIOLOGY

## 2024-08-20 PROCEDURE — 87070 CULTURE OTHR SPECIMN AEROBIC: CPT

## 2024-08-20 PROCEDURE — 87205 SMEAR GRAM STAIN: CPT

## 2024-08-20 PROCEDURE — 84157 ASSAY OF PROTEIN OTHER: CPT

## 2024-08-20 PROCEDURE — 7100000010 HC PHASE II RECOVERY - FIRST 15 MIN

## 2024-08-20 PROCEDURE — 82042 OTHER SOURCE ALBUMIN QUAN EA: CPT

## 2024-08-20 PROCEDURE — 49083 ABD PARACENTESIS W/IMAGING: CPT

## 2024-08-20 PROCEDURE — 6360000002 HC RX W HCPCS

## 2024-08-20 PROCEDURE — P9047 ALBUMIN (HUMAN), 25%, 50ML: HCPCS

## 2024-08-20 PROCEDURE — 6360000002 HC RX W HCPCS: Performed by: RADIOLOGY

## 2024-08-20 PROCEDURE — P9047 ALBUMIN (HUMAN), 25%, 50ML: HCPCS | Performed by: RADIOLOGY

## 2024-08-20 RX ORDER — ALBUMIN (HUMAN) 12.5 G/50ML
SOLUTION INTRAVENOUS CONTINUOUS PRN
Status: COMPLETED | OUTPATIENT
Start: 2024-08-20 | End: 2024-08-20

## 2024-08-20 RX ORDER — LIDOCAINE HYDROCHLORIDE 10 MG/ML
INJECTION, SOLUTION EPIDURAL; INFILTRATION; INTRACAUDAL; PERINEURAL PRN
Status: COMPLETED | OUTPATIENT
Start: 2024-08-20 | End: 2024-08-20

## 2024-08-20 RX ADMIN — LIDOCAINE HYDROCHLORIDE 8 ML: 10 INJECTION, SOLUTION EPIDURAL; INFILTRATION; INTRACAUDAL; PERINEURAL at 11:08

## 2024-08-20 RX ADMIN — ALBUMIN (HUMAN) 50 G: 0.25 INJECTION, SOLUTION INTRAVENOUS at 11:30

## 2024-08-20 NOTE — PROGRESS NOTES
TRANSFER - OUT REPORT:    Verbal report given to Cammy RN and Mili RN on Boby Glasgow being transferred to IR holding for routine post-op       Report consisted of patient's Situation, Background, Assessment and   Recommendations(SBAR).     Information from the following report(s) Nurse Handoff Report was reviewed with the receiving nurse.    Opportunity for questions and clarification was provided.      Patient transported with:   Registered Nurse    Successful paracentesis. 7700cc of milky white fluid was drained. 1000cc was sent to the lab per order. 50g of albumin was given.

## 2024-08-20 NOTE — PROGRESS NOTES
Pt arrived back to IR holding.  Vitals WNL.  Volunteer assisted with wheelchair.  Discharge instructions provided.

## 2024-08-20 NOTE — BRIEF OP NOTE
Department of Interventional Radiology Post-Procedure Note      Date: 8/20/2024     Procedure Performed:  paracentesis    Pre-procedure Diagnosis:  tense ascites    Post-procedure Diagnosis:  Same    Sedation:  none    Contrast used:  None    Estimated blood loss:  Minimal    Preliminary Findings:  Successful US guided paracentesis    Complications:  none    Full Report to Follow.    Electronically signed by Joesph Contreras MD on 8/20/2024 at 11:16 AM

## 2024-08-22 NOTE — PROGRESS NOTES
IR Procedure at [facility]:  Spoke with patient's wife Gaby and patient will arrive at 0930 at [facility] on 8/29/2024 for his procedure at 1030. Patient has been given below instructions and will take his medications as scheduled.      Follow your directions as prescribed by the doctor for your procedure and medications.  3.   Consult your provider as to when to stop blood thinner  4.   Do not take any pain medication within 6 hours of your procedure  5.   Do not drink any alcoholic beverages or use any street drugs 24 hours before procedure.  6.   Please wear simple, loose fitting clothing to the hospital.  Do not bring valuables (money,             credit cards, checkbooks, etc.)     7.   If you  have a Living Will and Durable Power of  for Healthcare, please bring in a copy.  8.   Please bring picture ID,  insurance card, paperwork from the doctors office            (H & P, Consent,  & card for implantable devices).  9.   Report to the information desk on the ground floor.  10. Take a shower the night before or morning of your procedure, do not apply any lotion, oil or powder.

## 2024-08-23 LAB
CULTURE: ABNORMAL
GRAM SMEAR: ABNORMAL
Lab: ABNORMAL
SPECIMEN: ABNORMAL

## 2024-08-26 RX ORDER — DULOXETIN HYDROCHLORIDE 60 MG/1
60 CAPSULE, DELAYED RELEASE ORAL DAILY
Qty: 90 CAPSULE | Refills: 0 | Status: SHIPPED | OUTPATIENT
Start: 2024-08-26

## 2024-08-29 ENCOUNTER — HOSPITAL ENCOUNTER (OUTPATIENT)
Dept: INTERVENTIONAL RADIOLOGY/VASCULAR | Age: 79
Discharge: HOME | End: 2024-08-29
Payer: COMMERCIAL

## 2024-08-29 VITALS
HEART RATE: 92 BPM | RESPIRATION RATE: 16 BRPM | OXYGEN SATURATION: 94 % | DIASTOLIC BLOOD PRESSURE: 59 MMHG | SYSTOLIC BLOOD PRESSURE: 132 MMHG

## 2024-08-29 DIAGNOSIS — R18.8 PANCREATIC ASCITES: ICD-10-CM

## 2024-08-29 LAB
ALBUMIN FLUID: 0.7 GM/DL
APTT: 32.4 SECONDS (ref 25.1–37.1)
FLUID TYPE: NORMAL INDEX
HCT VFR BLD CALC: 25.8 % (ref 42–52)
HEMOGLOBIN: 8 GM/DL (ref 13.5–18)
INR BLD: 1.1 INDEX
LYMPHOCYTES, BODY FLUID: 61 %
MCH RBC QN AUTO: 28.4 PG (ref 27–31)
MCHC RBC AUTO-ENTMCNC: 31 % (ref 32–36)
MCV RBC AUTO: 91.5 FL (ref 78–100)
MONOCYTE, FLUID: 28 %
NEUTROPHIL, FLUID: 11 %
PDW BLD-RTO: 15.6 % (ref 11.7–14.9)
PLATELET # BLD: 209 K/CU MM (ref 140–440)
PMV BLD AUTO: 9.6 FL (ref 7.5–11.1)
PROTEIN FLUID: 1.3 GM/DL
PROTHROMBIN TIME: 14.4 SECONDS (ref 11.7–14.5)
RBC # BLD: 2.82 M/CU MM (ref 4.6–6.2)
RBC FLUID: 6000 /CU MM
SOURCE FLUID: NORMAL
WBC # BLD: 5.8 K/CU MM (ref 4–10.5)
WBC FLUID: 121 /CU MM

## 2024-08-29 PROCEDURE — 85730 THROMBOPLASTIN TIME PARTIAL: CPT

## 2024-08-29 PROCEDURE — 87070 CULTURE OTHR SPECIMN AEROBIC: CPT

## 2024-08-29 PROCEDURE — C1729 CATH, DRAINAGE: HCPCS

## 2024-08-29 PROCEDURE — 89051 BODY FLUID CELL COUNT: CPT

## 2024-08-29 PROCEDURE — 85027 COMPLETE CBC AUTOMATED: CPT

## 2024-08-29 PROCEDURE — 87205 SMEAR GRAM STAIN: CPT

## 2024-08-29 PROCEDURE — 82042 OTHER SOURCE ALBUMIN QUAN EA: CPT

## 2024-08-29 PROCEDURE — 84157 ASSAY OF PROTEIN OTHER: CPT

## 2024-08-29 PROCEDURE — 49083 ABD PARACENTESIS W/IMAGING: CPT

## 2024-08-29 PROCEDURE — 85610 PROTHROMBIN TIME: CPT

## 2024-08-29 PROCEDURE — 2500000003 HC RX 250 WO HCPCS: Performed by: RADIOLOGY

## 2024-08-29 PROCEDURE — P9047 ALBUMIN (HUMAN), 25%, 50ML: HCPCS

## 2024-08-29 PROCEDURE — 6360000002 HC RX W HCPCS

## 2024-08-29 RX ORDER — LIDOCAINE HYDROCHLORIDE 10 MG/ML
INJECTION, SOLUTION EPIDURAL; INFILTRATION; INTRACAUDAL; PERINEURAL PRN
Status: COMPLETED | OUTPATIENT
Start: 2024-08-29 | End: 2024-08-29

## 2024-08-29 RX ADMIN — LIDOCAINE HYDROCHLORIDE 7 ML: 10 INJECTION, SOLUTION EPIDURAL; INFILTRATION; INTRACAUDAL; PERINEURAL at 10:35

## 2024-08-29 NOTE — BRIEF OP NOTE
Department of Interventional Radiology Post-Procedure Note      Date: 8/29/2024     Interventional Radiologist: Constantine    Procedure Performed:  Paracentesis    H&P Status: H&P was reviewed, the patient was examined and no change has occurred in patient's condition since H&P was completed.    Pre-procedure Diagnosis:  Ascites    Post-procedure Diagnosis:  Ascites    Sedation:  none    Contrast used:  none    Estimated blood loss:  Minimal    Preliminary Findings:  Sucessful    Complications:  none    Full Report to Follow.    Electronically signed by Jennifer Fitch MD on 8/29/2024 at 4:42 PM

## 2024-08-29 NOTE — CONSULTS
Consult Interventional Radiology    Date:2024  Name:Boby Glasgow   :1945   MR#:[id number]    SEX:male     Planned procedure:  Paracentesis  Indication: Ascites    H&P Status: H&P was reviewed, the patient was examined and no change has occurred in patient's condition since H&P was completed.    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  Initially treated with vancomycin but had a supratherapeutic level, hence, it was substituted by daptomycin.  Cumulative 6-week course of therapy will be completed on 3/13/2023.    Anxiety     Chronic heart failure, unspecified heart failure type (HCC) 2023    Chronic heart failure, unspecified heart failure type (HCC) 2023    Cirrhosis, alcoholic (HCC)     Diabetes mellitus (HCC)     GERD (gastroesophageal reflux disease)     GERD (gastroesophageal reflux disease)     Hyperlipidemia     Hypertension     Meniere's disease     Portal hypertension (HCC)     Pulmonary nodules     Secondary esophageal varices without bleeding (HCC)     Sleep apnea     SOB (shortness of breath)     Thyroid disease         Past Surgical History:  Past Surgical History:   Procedure Laterality Date    APPENDECTOMY      CHOLECYSTECTOMY      COLONOSCOPY      ENDOSCOPY, COLON, DIAGNOSTIC      FOOT SURGERY      needle removed,not sure which foot, per wife.    TONSILLECTOMY      UPPER GASTROINTESTINAL ENDOSCOPY N/A 2022    EGD BIOPSY performed by Travis Phelan MD at [facility] ENDOSCOPY    VASECTOMY         Social History:  Social History     Socioeconomic History    Marital status:      Spouse name: Not on file    Number of children: Not on file    Years of education: Not on file    Highest education level: Not on file   Occupational History     Comment: Retired    Tobacco Use    Smoking status: Former     Current packs/day: 0.00

## 2024-08-29 NOTE — PROGRESS NOTES
TRANSFER - OUT REPORT:    Verbal report given to Humaira RN and Marj RN on Boby Glasgow being transferred to IR holding for routine post-op       Report consisted of patient's Situation, Background, Assessment and   Recommendations(SBAR).     Information from the following report(s) Nurse Handoff Report was reviewed with the receiving nurse.    Opportunity for questions and clarification was provided.      Patient transported with:   Registered Nurse    Successful paracentesis. 9100cc was kathy orange fluid was drained. 1000cc was sent tot he lab per order. 75g of albumin was given per order.

## 2024-08-29 NOTE — PROGRESS NOTES
Pt to pre op holding for paracentesis, VSS, alert and oriented x 4, denies pain. IV in place labs in process

## 2024-08-29 NOTE — PROGRESS NOTES
Pt to post op holding, VSS, denies pain, dressing clean dry and intact, discharge education provided.

## 2024-09-01 LAB
CULTURE: ABNORMAL
GRAM SMEAR: ABNORMAL
Lab: ABNORMAL
SPECIMEN: ABNORMAL

## 2024-09-04 NOTE — PROGRESS NOTES
IR Procedure at [facility]:  Spoke with patient's wife Gaby  and patient will arrive at 0700 at [facility] on 9/5/2024 for his procedure at 0800. Patient has been given below instructions and knows to take his medications as scheduled.      Follow your directions as prescribed by the doctor for your procedure and medications.  3.   Consult your provider as to when to stop blood thinner  4.   Do not take any pain medication within 6 hours of your procedure  5.   Do not drink any alcoholic beverages or use any street drugs 24 hours before procedure.  6.   Please wear simple, loose fitting clothing to the hospital.  Do not bring valuables (money,             credit cards, checkbooks, etc.)     7.   If you  have a Living Will and Durable Power of  for Healthcare, please bring in a copy.  8.   Please bring picture ID,  insurance card, paperwork from the doctors office            (H & P, Consent,  & card for implantable devices).  9.   Report to the information desk on the ground floor.  10. Take a shower the night before or morning of your procedure, do not apply any lotion, oil or powder.

## 2024-09-05 ENCOUNTER — HOSPITAL ENCOUNTER (OUTPATIENT)
Dept: INTERVENTIONAL RADIOLOGY/VASCULAR | Age: 79
Discharge: HOME | End: 2024-09-05
Payer: COMMERCIAL

## 2024-09-05 VITALS
HEART RATE: 90 BPM | TEMPERATURE: 97.9 F | RESPIRATION RATE: 15 BRPM | SYSTOLIC BLOOD PRESSURE: 138 MMHG | DIASTOLIC BLOOD PRESSURE: 77 MMHG | OXYGEN SATURATION: 95 %

## 2024-09-05 DIAGNOSIS — R18.8 PANCREATIC ASCITES: ICD-10-CM

## 2024-09-05 LAB
ALBUMIN FLUID: 0.8 GM/DL
FLUID TYPE: NORMAL INDEX
LYMPHOCYTES, BODY FLUID: 17 %
MESOTHELIAL FLUID: 7 /100 WBC
MONOCYTE, FLUID: 78 %
NEUTROPHIL, FLUID: 5 %
PROTEIN FLUID: 1.5 GM/DL
RBC FLUID: 5000 /CU MM
WBC FLUID: 102 /CU MM

## 2024-09-05 PROCEDURE — 2500000003 HC RX 250 WO HCPCS: Performed by: RADIOLOGY

## 2024-09-05 PROCEDURE — 6360000002 HC RX W HCPCS

## 2024-09-05 PROCEDURE — 84157 ASSAY OF PROTEIN OTHER: CPT

## 2024-09-05 PROCEDURE — 7100000010 HC PHASE II RECOVERY - FIRST 15 MIN: Performed by: RADIOLOGY

## 2024-09-05 PROCEDURE — 87205 SMEAR GRAM STAIN: CPT

## 2024-09-05 PROCEDURE — 2709999900 IR US GUIDED PARACENTESIS

## 2024-09-05 PROCEDURE — P9047 ALBUMIN (HUMAN), 25%, 50ML: HCPCS

## 2024-09-05 PROCEDURE — 82042 OTHER SOURCE ALBUMIN QUAN EA: CPT

## 2024-09-05 PROCEDURE — 87070 CULTURE OTHR SPECIMN AEROBIC: CPT

## 2024-09-05 PROCEDURE — 49083 ABD PARACENTESIS W/IMAGING: CPT

## 2024-09-05 PROCEDURE — 89051 BODY FLUID CELL COUNT: CPT

## 2024-09-05 RX ORDER — LIDOCAINE HYDROCHLORIDE 10 MG/ML
INJECTION, SOLUTION EPIDURAL; INFILTRATION; INTRACAUDAL; PERINEURAL PRN
Status: COMPLETED | OUTPATIENT
Start: 2024-09-05 | End: 2024-09-05

## 2024-09-05 RX ADMIN — LIDOCAINE HYDROCHLORIDE 6 ML: 10 INJECTION, SOLUTION EPIDURAL; INFILTRATION; INTRACAUDAL; PERINEURAL at 08:33

## 2024-09-05 NOTE — PROGRESS NOTES
TRANSFER - OUT REPORT:    Verbal report given to Suki LAGUNAS on Boby Glasgow being transferred back to IR holding for routine post-op       Report consisted of patient's Situation, Background, Assessment and   Recommendations(SBAR).     Successful Paracentesis with Dr. Phillips in IR at this time. Pt. tolerated well. 6,350 cc of cloudy, milky, pink tinged fluid removed from right lower ABD. 37.5g Albumin IV given per protocol. Dressing applied to right lower ABD.  1,000cc sent to lab for analysis.    Information from the following report(s) Nurse Handoff Report was reviewed with the receiving nurse.    Opportunity for questions and clarification was provided.      Patient transported with:   Registered Nurse

## 2024-09-05 NOTE — PROGRESS NOTES
Pt arrived to [facility] for scheduled procedure. VSS. IV in place. Call light within reach.

## 2024-09-05 NOTE — BRIEF OP NOTE
Brief Postoperative Note      Patient: Boby Glasgow  YOB: 1945  MRN: [id number]    Date of Procedure: 9/5/2024    * No Diagnosis Codes entered *    Post-Op Diagnosis: Same       * No procedures listed *    * No surgeons listed *    Assistant:  * No surgical staff found *    Anesthesia: * No anesthesia type entered *    Estimated Blood Loss (mL): none    Complications: None    Specimens:   * No specimens in log *    Implants:  * No implants in log *      Drains: * No LDAs found *    Findings:  Infection Present At Time Of Surgery (PATOS) (choose all levels that have infection present):  No infection present      Electronically signed by Kush Phillips MD on 9/5/2024 at 8:35 AM

## 2024-09-05 NOTE — PROGRESS NOTES
Pt arrived back to IR holding post paracentesis.  Vitals WNL.  Pt okay to discharge.  Volunteer will assist pt to car.

## 2024-09-08 LAB
CULTURE: ABNORMAL
GRAM SMEAR: ABNORMAL
Lab: ABNORMAL
SPECIMEN: ABNORMAL

## 2024-09-12 ENCOUNTER — HOSPITAL ENCOUNTER (OUTPATIENT)
Dept: INTERVENTIONAL RADIOLOGY/VASCULAR | Age: 79
Discharge: HOME | End: 2024-09-12
Payer: COMMERCIAL

## 2024-09-12 VITALS
DIASTOLIC BLOOD PRESSURE: 72 MMHG | HEART RATE: 94 BPM | OXYGEN SATURATION: 92 % | TEMPERATURE: 98 F | SYSTOLIC BLOOD PRESSURE: 136 MMHG

## 2024-09-12 DIAGNOSIS — R18.8 OTHER ASCITES: ICD-10-CM

## 2024-09-12 LAB
ALBUMIN FLD-MCNC: 0.6 G/DL
APPEARANCE FLD: NORMAL
BODY FLD TYPE: NORMAL
CLOT CHECK: NORMAL
COLOR FLD: YELLOW
MONOCYTES NFR FLD: 83 %
NEUTROPHILS NFR FLD: 17 %
PROT FLD-MCNC: 1.3 G/DL
RBC # FLD: <2000 CELLS/UL
SPECIMEN TYPE: NORMAL
SPECIMEN TYPE: NORMAL

## 2024-09-12 PROCEDURE — 82042 OTHER SOURCE ALBUMIN QUAN EA: CPT

## 2024-09-12 PROCEDURE — 6360000002 HC RX W HCPCS: Performed by: RADIOLOGY

## 2024-09-12 PROCEDURE — 2500000003 HC RX 250 WO HCPCS: Performed by: RADIOLOGY

## 2024-09-12 PROCEDURE — P9047 ALBUMIN (HUMAN), 25%, 50ML: HCPCS | Performed by: RADIOLOGY

## 2024-09-12 PROCEDURE — 84157 ASSAY OF PROTEIN OTHER: CPT

## 2024-09-12 PROCEDURE — 7100000010 HC PHASE II RECOVERY - FIRST 15 MIN

## 2024-09-12 PROCEDURE — 49083 ABD PARACENTESIS W/IMAGING: CPT

## 2024-09-12 PROCEDURE — 6360000002 HC RX W HCPCS

## 2024-09-12 PROCEDURE — P9047 ALBUMIN (HUMAN), 25%, 50ML: HCPCS

## 2024-09-12 PROCEDURE — C1729 CATH, DRAINAGE: HCPCS

## 2024-09-12 PROCEDURE — 89051 BODY FLUID CELL COUNT: CPT

## 2024-09-12 RX ORDER — ALBUMIN (HUMAN) 12.5 G/50ML
SOLUTION INTRAVENOUS CONTINUOUS PRN
Status: COMPLETED | OUTPATIENT
Start: 2024-09-12 | End: 2024-09-12

## 2024-09-12 RX ORDER — LIDOCAINE HYDROCHLORIDE 10 MG/ML
INJECTION, SOLUTION EPIDURAL; INFILTRATION; INTRACAUDAL; PERINEURAL PRN
Status: COMPLETED | OUTPATIENT
Start: 2024-09-12 | End: 2024-09-12

## 2024-09-12 RX ADMIN — LIDOCAINE HYDROCHLORIDE 8 ML: 10 INJECTION, SOLUTION EPIDURAL; INFILTRATION; INTRACAUDAL; PERINEURAL at 09:53

## 2024-09-12 RX ADMIN — ALBUMIN (HUMAN) 37.5 G: 0.25 INJECTION, SOLUTION INTRAVENOUS at 10:28

## 2024-09-19 ENCOUNTER — HOSPITAL ENCOUNTER (OUTPATIENT)
Dept: INTERVENTIONAL RADIOLOGY/VASCULAR | Age: 79
Discharge: HOME | End: 2024-09-19
Payer: COMMERCIAL

## 2024-09-19 VITALS
OXYGEN SATURATION: 94 % | RESPIRATION RATE: 16 BRPM | HEART RATE: 106 BPM | SYSTOLIC BLOOD PRESSURE: 138 MMHG | DIASTOLIC BLOOD PRESSURE: 67 MMHG

## 2024-09-19 DIAGNOSIS — R18.8 PANCREATIC ASCITES: ICD-10-CM

## 2024-09-19 LAB
ALBUMIN FLD-MCNC: 0.7 G/DL
APPEARANCE FLD: NORMAL
BLASTS NFR FLD: 0 %
BODY FLD TYPE: NORMAL
CLOT CHECK: NORMAL
COLOR FLD: COLORLESS
EOSINOPHIL NFR FLD: 0 %
LYMPHOCYTES NFR FLD: 33 %
MONOCYTES NFR FLD: 54 %
NEUTROPHILS NFR FLD: 13 %
PROT FLD-MCNC: 1.4 G/DL
RBC # FLD: <2000 CELLS/UL
SPECIMEN TYPE: NORMAL
SPECIMEN TYPE: NORMAL
UNIDENT CELLS NFR FLD: 0 %
WBC # FLD: 97 CELLS/UL

## 2024-09-19 PROCEDURE — C1729 CATH, DRAINAGE: HCPCS

## 2024-09-19 PROCEDURE — 6360000002 HC RX W HCPCS

## 2024-09-19 PROCEDURE — 49083 ABD PARACENTESIS W/IMAGING: CPT

## 2024-09-19 PROCEDURE — 82042 OTHER SOURCE ALBUMIN QUAN EA: CPT

## 2024-09-19 PROCEDURE — 87070 CULTURE OTHR SPECIMN AEROBIC: CPT

## 2024-09-19 PROCEDURE — 6360000002 HC RX W HCPCS: Performed by: SPECIALIST

## 2024-09-19 PROCEDURE — 87205 SMEAR GRAM STAIN: CPT

## 2024-09-19 PROCEDURE — P9047 ALBUMIN (HUMAN), 25%, 50ML: HCPCS

## 2024-09-19 PROCEDURE — P9047 ALBUMIN (HUMAN), 25%, 50ML: HCPCS | Performed by: SPECIALIST

## 2024-09-19 PROCEDURE — 89051 BODY FLUID CELL COUNT: CPT

## 2024-09-19 PROCEDURE — 84157 ASSAY OF PROTEIN OTHER: CPT

## 2024-09-19 PROCEDURE — 87075 CULTR BACTERIA EXCEPT BLOOD: CPT

## 2024-09-19 RX ORDER — ALBUMIN (HUMAN) 12.5 G/50ML
62.5 SOLUTION INTRAVENOUS ONCE
Status: DISCONTINUED | OUTPATIENT
Start: 2024-09-19 | End: 2024-09-20 | Stop reason: HOSPADM

## 2024-09-19 RX ORDER — ALBUMIN (HUMAN) 12.5 G/50ML
SOLUTION INTRAVENOUS CONTINUOUS PRN
Status: COMPLETED | OUTPATIENT
Start: 2024-09-19 | End: 2024-09-19

## 2024-09-19 RX ADMIN — ALBUMIN (HUMAN) 62.5 G: 0.25 INJECTION, SOLUTION INTRAVENOUS at 10:15

## 2024-09-20 ENCOUNTER — TELEPHONE (OUTPATIENT)
Dept: GASTROENTEROLOGY | Age: 79
End: 2024-09-20

## 2024-09-20 NOTE — TELEPHONE ENCOUNTER
Joelle's wife called to get a medication refill for XIFAXAN 550 MG and it goes to a mail order pharmacy and the wife would like to get a 90 days supply sent into the mail order pharmacy is [facility].  Thank you

## 2024-09-22 LAB
MICROORGANISM SPEC CULT: NORMAL
MICROORGANISM/AGENT SPEC: NORMAL
SERVICE CMNT-IMP: NORMAL
SPECIMEN DESCRIPTION: NORMAL

## 2024-09-26 ENCOUNTER — HOSPITAL ENCOUNTER (OUTPATIENT)
Dept: INTERVENTIONAL RADIOLOGY/VASCULAR | Age: 79
Discharge: HOME | End: 2024-09-26
Payer: COMMERCIAL

## 2024-09-26 VITALS
SYSTOLIC BLOOD PRESSURE: 164 MMHG | HEART RATE: 96 BPM | RESPIRATION RATE: 16 BRPM | DIASTOLIC BLOOD PRESSURE: 83 MMHG | OXYGEN SATURATION: 97 %

## 2024-09-26 DIAGNOSIS — R18.8 OTHER ASCITES: ICD-10-CM

## 2024-09-26 LAB
ALBUMIN FLD-MCNC: 0.7 G/DL
AMYLASE FLD-CCNC: 12 U/L
APPEARANCE FLD: NORMAL
BLASTS NFR FLD: 0 %
BODY FLD TYPE: NORMAL
CLOT CHECK: NORMAL
COLOR FLD: YELLOW
EOSINOPHIL NFR FLD: 0 %
ERYTHROCYTE [DISTWIDTH] IN BLOOD BY AUTOMATED COUNT: 15.5 % (ref 11.7–14.9)
GLUCOSE FLD-MCNC: 108 MG/DL
HCT VFR BLD AUTO: 27.2 % (ref 42–52)
HGB BLD-MCNC: 8.2 G/DL (ref 13.5–18)
INR PPP: 1.1
LDH FLD L TO P-CCNC: 40 U/L
LYMPHOCYTES NFR FLD: 38 %
MCH RBC QN AUTO: 26.4 PG (ref 27–31)
MCHC RBC AUTO-ENTMCNC: 30.1 G/DL (ref 32–36)
MCV RBC AUTO: 87.5 FL (ref 78–100)
MESOTHELIAL CELLS BODY FLUID: 11 %
MONOCYTES NFR FLD: 60 %
NEUTROPHILS NFR FLD: 2 %
PARTIAL THROMBOPLASTIN TIME: 34.4 SEC (ref 25.1–37.1)
PLATELET # BLD AUTO: 259 K/UL (ref 140–440)
PMV BLD AUTO: 11 FL (ref 7.5–11.1)
PROT FLD-MCNC: 1.3 G/DL
PROTHROMBIN TIME: 14.6 SEC (ref 11.7–14.5)
RBC # BLD AUTO: 3.11 M/UL (ref 4.6–6.2)
RBC # FLD: <2000 CELLS/UL
SPECIMEN TYPE: NORMAL
UNIDENT CELLS NFR FLD: 0 %
WBC # FLD: 108 CELLS/UL
WBC OTHER # BLD: 5.7 K/UL (ref 4–10.5)

## 2024-09-26 PROCEDURE — 7100000010 HC PHASE II RECOVERY - FIRST 15 MIN

## 2024-09-26 PROCEDURE — 49083 ABD PARACENTESIS W/IMAGING: CPT

## 2024-09-26 PROCEDURE — 82150 ASSAY OF AMYLASE: CPT

## 2024-09-26 PROCEDURE — 88305 TISSUE EXAM BY PATHOLOGIST: CPT | Performed by: PATHOLOGY

## 2024-09-26 PROCEDURE — P9047 ALBUMIN (HUMAN), 25%, 50ML: HCPCS | Performed by: SPECIALIST

## 2024-09-26 PROCEDURE — 87070 CULTURE OTHR SPECIMN AEROBIC: CPT

## 2024-09-26 PROCEDURE — 85730 THROMBOPLASTIN TIME PARTIAL: CPT

## 2024-09-26 PROCEDURE — 87205 SMEAR GRAM STAIN: CPT

## 2024-09-26 PROCEDURE — 85610 PROTHROMBIN TIME: CPT

## 2024-09-26 PROCEDURE — 2709999900 IR US GUIDED PARACENTESIS

## 2024-09-26 PROCEDURE — 82945 GLUCOSE OTHER FLUID: CPT

## 2024-09-26 PROCEDURE — 2500000003 HC RX 250 WO HCPCS: Performed by: RADIOLOGY

## 2024-09-26 PROCEDURE — 87075 CULTR BACTERIA EXCEPT BLOOD: CPT

## 2024-09-26 PROCEDURE — 85027 COMPLETE CBC AUTOMATED: CPT

## 2024-09-26 PROCEDURE — 88108 CYTOPATH CONCENTRATE TECH: CPT | Performed by: PATHOLOGY

## 2024-09-26 PROCEDURE — 6360000002 HC RX W HCPCS: Performed by: SPECIALIST

## 2024-09-26 PROCEDURE — 84157 ASSAY OF PROTEIN OTHER: CPT

## 2024-09-26 PROCEDURE — 83615 LACTATE (LD) (LDH) ENZYME: CPT

## 2024-09-26 PROCEDURE — 7100000011 HC PHASE II RECOVERY - ADDTL 15 MIN

## 2024-09-26 PROCEDURE — 82042 OTHER SOURCE ALBUMIN QUAN EA: CPT

## 2024-09-26 PROCEDURE — 89051 BODY FLUID CELL COUNT: CPT

## 2024-09-26 RX ORDER — LIDOCAINE HYDROCHLORIDE 10 MG/ML
INJECTION, SOLUTION EPIDURAL; INFILTRATION; INTRACAUDAL; PERINEURAL PRN
Status: COMPLETED | OUTPATIENT
Start: 2024-09-26 | End: 2024-09-26

## 2024-09-26 RX ORDER — ALBUMIN (HUMAN) 12.5 G/50ML
SOLUTION INTRAVENOUS CONTINUOUS PRN
Status: COMPLETED | OUTPATIENT
Start: 2024-09-26 | End: 2024-09-26

## 2024-09-26 RX ADMIN — LIDOCAINE HYDROCHLORIDE 8 ML: 10 INJECTION, SOLUTION EPIDURAL; INFILTRATION; INTRACAUDAL; PERINEURAL at 10:08

## 2024-09-26 RX ADMIN — ALBUMIN (HUMAN) 37.5 G: 0.25 INJECTION, SOLUTION INTRAVENOUS at 10:31

## 2024-09-27 LAB — NON-GYN CYTOLOGY REPORT: NORMAL

## 2024-10-02 NOTE — PROGRESS NOTES
IR Procedure at [facility]:  Spoke with patient's wife Gaby and patient will arrive at 1100 at [facility] on 10/4/2024 for his procedure at 1200. Patient has been given below instructions and will take his medications as scheduled.        Follow your directions as prescribed by the doctor for your procedure and medications.  3.   Consult your provider as to when to stop blood thinner  4.   Do not take any pain medication within 6 hours of your procedure  5.   Do not drink any alcoholic beverages or use any street drugs 24 hours before procedure.  6.   Please wear simple, loose fitting clothing to the hospital.  Do not bring valuables (money,             credit cards, checkbooks, etc.)     7.   If you  have a Living Will and Durable Power of  for Healthcare, please bring in a copy.  8.   Please bring picture ID,  insurance card, paperwork from the doctors office            (H & P, Consent,  & card for implantable devices).  9.   Report to the information desk on the ground floor.  10. Take a shower the night before or morning of your procedure, do not apply any lotion, oil or powder.

## 2024-10-04 ENCOUNTER — HOSPITAL ENCOUNTER (OUTPATIENT)
Dept: INTERVENTIONAL RADIOLOGY/VASCULAR | Age: 79
Discharge: HOME | End: 2024-10-04
Payer: COMMERCIAL

## 2024-10-04 VITALS
OXYGEN SATURATION: 98 % | SYSTOLIC BLOOD PRESSURE: 141 MMHG | HEART RATE: 90 BPM | DIASTOLIC BLOOD PRESSURE: 70 MMHG | RESPIRATION RATE: 18 BRPM | TEMPERATURE: 98.3 F

## 2024-10-04 DIAGNOSIS — R18.8 OTHER ASCITES: ICD-10-CM

## 2024-10-04 LAB
ALBUMIN FLD-MCNC: 0.7 G/DL
AMYLASE FLD-CCNC: 11 U/L
APPEARANCE FLD: NORMAL
BLASTS NFR FLD: 0 %
BODY FLD TYPE: NORMAL
CLOT CHECK: NORMAL
COLOR FLD: YELLOW
EOSINOPHIL NFR FLD: 0 %
LYMPHOCYTES NFR FLD: 69 %
MESOTHELIAL CELLS BODY FLUID: 2 %
MONOCYTES NFR FLD: 20 %
NEUTROPHILS NFR FLD: 11 %
RBC # FLD: <2000 CELLS/UL
SPECIMEN TYPE: NORMAL
SPECIMEN TYPE: NORMAL
UNIDENT CELLS NFR FLD: 0 %
WBC # FLD: 101 CELLS/UL

## 2024-10-04 PROCEDURE — 87075 CULTR BACTERIA EXCEPT BLOOD: CPT

## 2024-10-04 PROCEDURE — 82042 OTHER SOURCE ALBUMIN QUAN EA: CPT

## 2024-10-04 PROCEDURE — 89051 BODY FLUID CELL COUNT: CPT

## 2024-10-04 PROCEDURE — 84157 ASSAY OF PROTEIN OTHER: CPT

## 2024-10-04 PROCEDURE — 87070 CULTURE OTHR SPECIMN AEROBIC: CPT

## 2024-10-04 PROCEDURE — 6360000002 HC RX W HCPCS

## 2024-10-04 PROCEDURE — C1729 CATH, DRAINAGE: HCPCS

## 2024-10-04 PROCEDURE — 6360000002 HC RX W HCPCS: Performed by: STUDENT IN AN ORGANIZED HEALTH CARE EDUCATION/TRAINING PROGRAM

## 2024-10-04 PROCEDURE — 87205 SMEAR GRAM STAIN: CPT

## 2024-10-04 PROCEDURE — 83615 LACTATE (LD) (LDH) ENZYME: CPT

## 2024-10-04 PROCEDURE — P9047 ALBUMIN (HUMAN), 25%, 50ML: HCPCS

## 2024-10-04 PROCEDURE — P9047 ALBUMIN (HUMAN), 25%, 50ML: HCPCS | Performed by: STUDENT IN AN ORGANIZED HEALTH CARE EDUCATION/TRAINING PROGRAM

## 2024-10-04 PROCEDURE — 82150 ASSAY OF AMYLASE: CPT

## 2024-10-04 PROCEDURE — 7100000010 HC PHASE II RECOVERY - FIRST 15 MIN

## 2024-10-04 PROCEDURE — 2500000003 HC RX 250 WO HCPCS: Performed by: STUDENT IN AN ORGANIZED HEALTH CARE EDUCATION/TRAINING PROGRAM

## 2024-10-04 PROCEDURE — 49083 ABD PARACENTESIS W/IMAGING: CPT

## 2024-10-04 PROCEDURE — 82945 GLUCOSE OTHER FLUID: CPT

## 2024-10-04 PROCEDURE — 7100000011 HC PHASE II RECOVERY - ADDTL 15 MIN

## 2024-10-04 RX ORDER — LIDOCAINE HYDROCHLORIDE 10 MG/ML
INJECTION, SOLUTION EPIDURAL; INFILTRATION; INTRACAUDAL; PERINEURAL PRN
Status: COMPLETED | OUTPATIENT
Start: 2024-10-04 | End: 2024-10-04

## 2024-10-04 RX ORDER — ALBUMIN (HUMAN) 12.5 G/50ML
SOLUTION INTRAVENOUS CONTINUOUS PRN
Status: COMPLETED | OUTPATIENT
Start: 2024-10-04 | End: 2024-10-04

## 2024-10-04 RX ADMIN — ALBUMIN (HUMAN) 62.5 G: 0.25 INJECTION, SOLUTION INTRAVENOUS at 13:36

## 2024-10-04 RX ADMIN — LIDOCAINE HYDROCHLORIDE 5 ML: 10 INJECTION, SOLUTION EPIDURAL; INFILTRATION; INTRACAUDAL; PERINEURAL at 13:10

## 2024-10-04 NOTE — BRIEF OP NOTE
Brief Postoperative Note      Patient: Boby Glasgow  YOB: 1945  MRN: [id number]    Date of Procedure: 10/4/2024    Successful image guided paracentesis.    Electronically signed by Clinton Razo MD on 10/4/2024 at 2:43 PM

## 2024-10-04 NOTE — PRE SEDATION
Pre-Procedure Note    Patient Name: Boby Glasgow   YOB: 1945  Room/Bed: Room/bed info not found  Medical Record Number: [id number]  Date: 10/4/2024   Time: 2:42 PM       Indication:  Ascites    Consent: I have discussed with the patient and/or the patient representative the indication, alternatives, and the possible risks and/or complications of the planned procedure and the anesthesia methods. The patient and/or patient representative appear to understand and agree to proceed.    Vital Signs:   Vitals:    10/04/24 1350   BP: (!) 141/70   Pulse: 90   Resp:    Temp:    SpO2: 98%       Past Medical History:   has a past medical history of Acute bacterial endocarditis, Anxiety, Chronic heart failure, unspecified heart failure type (HCC), Chronic heart failure, unspecified heart failure type (HCC), Cirrhosis, alcoholic (HCC), Diabetes mellitus (HCC), GERD (gastroesophageal reflux disease), GERD (gastroesophageal reflux disease), Hyperlipidemia, Hypertension, Meniere's disease, Portal hypertension (HCC), Pulmonary nodules, Secondary esophageal varices without bleeding (HCC), Sleep apnea, SOB (shortness of breath), and Thyroid disease.    Past Surgical History:   has a past surgical history that includes Cholecystectomy; Vasectomy; Colonoscopy; Endoscopy, colon, diagnostic; Foot surgery; Upper gastrointestinal endoscopy (N/A, 1/19/2022); Tonsillectomy; and Appendectomy.    Medications:   Scheduled Meds:   Continuous Infusions:   PRN Meds:   Home Meds:   Prior to Admission medications    Medication Sig Start Date End Date Taking? Authorizing Provider   rifAXIMin (XIFAXAN) 550 MG tablet Take 1 tablet by mouth 2 times daily 9/27/24  Yes Travis Phelan MD   DULoxetine (CYMBALTA) 60 MG extended release capsule Take 1 capsule by mouth once daily 8/26/24  Yes Paul Baeza MD   levothyroxine (SYNTHROID) 50 MCG tablet Take 1 tablet by mouth once daily 7/23/24  Yes Paul Baeza MD

## 2024-10-04 NOTE — PROGRESS NOTES
TRANSFER - OUT REPORT:    Verbal report given to JANNETH Gerardo on Boby Glasgow being transferred to IR [facility] for routine post-op.  Paracentesis performed by Dr. Razo.  Vitals remained WNL.  10,700 cc cloudy yellow ascitic fluid removed.  Labs obtained.  Dressing clean, dry, and intact. Albumin administered.     Report consisted of patient's Situation, Background, Assessment and   Recommendations(SBAR).     Information from the following report(s) Nurse Handoff Report was reviewed with the receiving nurse.    Opportunity for questions and clarification was provided.      Patient transported with:   Registered Nurse

## 2024-10-04 NOTE — PROGRESS NOTES
Pt to post op following paracentesis, pt alert and oriented VSS dressing clean dry and intact, denies pain. Ok to dc after albumin infusion

## 2024-10-05 LAB
GLUCOSE FLD-MCNC: 118 MG/DL
LDH FLD L TO P-CCNC: 37 U/L
PROT FLD-MCNC: 1.3 G/DL
SPECIMEN TYPE: NORMAL

## 2024-10-09 LAB — NON-GYN CYTOLOGY REPORT: NORMAL

## 2024-10-10 NOTE — PROGRESS NOTES
IR Procedure at [facility]:  Spoke with patients wife Gaby and patient will arrive at 0900 at [facility] on 10/11/2024 for his procedure at 1000. Patient has been given below instructions and will take his medications as scheduled.      Follow your directions as prescribed by the doctor for your procedure and medications.  3.   Consult your provider as to when to stop blood thinner  4.   Do not take any pain medication within 6 hours of your procedure  5.   Do not drink any alcoholic beverages or use any street drugs 24 hours before procedure.  6.   Please wear simple, loose fitting clothing to the hospital.  Do not bring valuables (money,             credit cards, checkbooks, etc.)     7.   If you  have a Living Will and Durable Power of  for Healthcare, please bring in a copy.  8.   Please bring picture ID,  insurance card, paperwork from the doctors office            (H & P, Consent,  & card for implantable devices).  9.   Report to the information desk on the ground floor.  10. Take a shower the night before or morning of your procedure, do not apply any lotion, oil or powder.

## 2024-10-11 ENCOUNTER — HOSPITAL ENCOUNTER (OUTPATIENT)
Dept: INTERVENTIONAL RADIOLOGY/VASCULAR | Age: 79
Discharge: HOME | End: 2024-10-11
Payer: COMMERCIAL

## 2024-10-11 VITALS
RESPIRATION RATE: 18 BRPM | DIASTOLIC BLOOD PRESSURE: 73 MMHG | TEMPERATURE: 98.3 F | SYSTOLIC BLOOD PRESSURE: 149 MMHG | HEART RATE: 85 BPM | OXYGEN SATURATION: 97 %

## 2024-10-11 DIAGNOSIS — R18.8 PANCREATIC ASCITES: ICD-10-CM

## 2024-10-11 LAB
ALBUMIN FLD-MCNC: 0.6 G/DL
AMYLASE FLD-CCNC: 13 U/L
APPEARANCE FLD: NORMAL
BODY FLD TYPE: NORMAL
CLOT CHECK: NORMAL
COLOR FLD: YELLOW
GLUCOSE FLD-MCNC: 115 MG/DL
LDH FLD L TO P-CCNC: 41 U/L
MONOCYTES NFR FLD: 86 %
NEUTROPHILS NFR FLD: 14 %
PROT FLD-MCNC: 1.2 G/DL
RBC # FLD: <2000 CELLS/UL
SPECIMEN TYPE: NORMAL
WBC # FLD: 149 CELLS/UL

## 2024-10-11 PROCEDURE — 6360000002 HC RX W HCPCS: Performed by: SPECIALIST

## 2024-10-11 PROCEDURE — 89051 BODY FLUID CELL COUNT: CPT

## 2024-10-11 PROCEDURE — C1729 CATH, DRAINAGE: HCPCS

## 2024-10-11 PROCEDURE — 87070 CULTURE OTHR SPECIMN AEROBIC: CPT

## 2024-10-11 PROCEDURE — 87075 CULTR BACTERIA EXCEPT BLOOD: CPT

## 2024-10-11 PROCEDURE — 84157 ASSAY OF PROTEIN OTHER: CPT

## 2024-10-11 PROCEDURE — 82945 GLUCOSE OTHER FLUID: CPT

## 2024-10-11 PROCEDURE — P9047 ALBUMIN (HUMAN), 25%, 50ML: HCPCS | Performed by: SPECIALIST

## 2024-10-11 PROCEDURE — 82150 ASSAY OF AMYLASE: CPT

## 2024-10-11 PROCEDURE — P9047 ALBUMIN (HUMAN), 25%, 50ML: HCPCS

## 2024-10-11 PROCEDURE — 82042 OTHER SOURCE ALBUMIN QUAN EA: CPT

## 2024-10-11 PROCEDURE — 49083 ABD PARACENTESIS W/IMAGING: CPT

## 2024-10-11 PROCEDURE — 87205 SMEAR GRAM STAIN: CPT

## 2024-10-11 PROCEDURE — 83615 LACTATE (LD) (LDH) ENZYME: CPT

## 2024-10-11 PROCEDURE — 6360000002 HC RX W HCPCS

## 2024-10-11 PROCEDURE — 2500000003 HC RX 250 WO HCPCS: Performed by: STUDENT IN AN ORGANIZED HEALTH CARE EDUCATION/TRAINING PROGRAM

## 2024-10-11 RX ORDER — LIDOCAINE HYDROCHLORIDE 10 MG/ML
INJECTION, SOLUTION EPIDURAL; INFILTRATION; INTRACAUDAL; PERINEURAL PRN
Status: COMPLETED | OUTPATIENT
Start: 2024-10-11 | End: 2024-10-11

## 2024-10-11 RX ORDER — ALBUMIN (HUMAN) 12.5 G/50ML
SOLUTION INTRAVENOUS CONTINUOUS PRN
Status: COMPLETED | OUTPATIENT
Start: 2024-10-11 | End: 2024-10-11

## 2024-10-11 RX ADMIN — ALBUMIN (HUMAN) 50 G: 0.25 INJECTION, SOLUTION INTRAVENOUS at 10:51

## 2024-10-11 RX ADMIN — LIDOCAINE HYDROCHLORIDE 6 ML: 10 INJECTION, SOLUTION EPIDURAL; INFILTRATION; INTRACAUDAL; PERINEURAL at 10:23

## 2024-10-11 NOTE — OR NURSING
PT HAD A PARACENTESIS PERFORMED BY DR LIANG WITH 1,0000 CC OF CLOUDY YELLOW FLUID SENT TO LAB, AND A TOTAL OF 7750 CC WAS REMOVED.

## 2024-10-11 NOTE — PROGRESS NOTES
To IR holding. VSS. Labs drawn and sent to lab. IV started. Resting in bed quietly. No needs at this time.

## 2024-10-11 NOTE — PROGRESS NOTES
TRANSFER - OUT REPORT:    Verbal report given to LUCIANO/LUCIUS RN on Boby Glasgow being transferred to IR [facility] for routine progression of patient care       Report consisted of patient's Situation, Background, Assessment and   Recommendations(SBAR).     Information from the following report(s) Nurse Handoff Report was reviewed with the receiving nurse.    Opportunity for questions and clarification was provided.      Patient transported with:   Registered Nurse

## 2024-10-14 LAB
MICROORGANISM SPEC CULT: NORMAL
NON-GYN CYTOLOGY REPORT: NORMAL
SERVICE CMNT-IMP: NORMAL
SPECIMEN DESCRIPTION: NORMAL

## 2024-10-15 RX ORDER — FUROSEMIDE 40 MG
40 TABLET ORAL DAILY
Qty: 60 TABLET | Refills: 0 | Status: SHIPPED | OUTPATIENT
Start: 2024-10-15

## 2024-10-16 NOTE — PROGRESS NOTES
IR Procedure at [facility]:  Hien plummer with patients wife Gaby and he will arrive at 0900 at [facility] on 10/17/2024 for his procedure at 1000, patient will take his medications as scheduled and has been given below instructions.       Follow your directions as prescribed by the doctor for your procedure and medications.  3.   Consult your provider as to when to stop blood thinner  4.   Do not take any pain medication within 6 hours of your procedure  5.   Do not drink any alcoholic beverages or use any street drugs 24 hours before procedure.  6.   Please wear simple, loose fitting clothing to the hospital.  Do not bring valuables (money,             credit cards, checkbooks, etc.)     7.   If you  have a Living Will and Durable Power of  for Healthcare, please bring in a copy.  8.   Please bring picture ID,  insurance card, paperwork from the doctors office            (H & P, Consent,  & card for implantable devices).  9.   Report to the information desk on the ground floor.  10. Take a shower the night before or morning of your procedure, do not apply any lotion, oil or powder.

## 2024-10-17 ENCOUNTER — HOSPITAL ENCOUNTER (OUTPATIENT)
Dept: INTERVENTIONAL RADIOLOGY/VASCULAR | Age: 79
Discharge: HOME | End: 2024-10-17
Payer: COMMERCIAL

## 2024-10-17 VITALS
DIASTOLIC BLOOD PRESSURE: 72 MMHG | HEART RATE: 85 BPM | SYSTOLIC BLOOD PRESSURE: 153 MMHG | OXYGEN SATURATION: 97 % | TEMPERATURE: 98.3 F | RESPIRATION RATE: 15 BRPM

## 2024-10-17 DIAGNOSIS — R18.8 PANCREATIC ASCITES: ICD-10-CM

## 2024-10-17 LAB
ALBUMIN FLD-MCNC: 0.7 G/DL
AMYLASE FLD-CCNC: 14 U/L
APPEARANCE FLD: NORMAL
BODY FLD TYPE: NORMAL
CLOT CHECK: NORMAL
COLOR FLD: YELLOW
GLUCOSE FLD-MCNC: 118 MG/DL
LDH FLD L TO P-CCNC: 42 U/L
MONOCYTES NFR FLD: 85 %
NEUTROPHILS NFR FLD: 16 %
PROT FLD-MCNC: 1.3 G/DL
RBC # FLD: <2000 CELLS/UL
SPECIMEN TYPE: NORMAL
WBC # FLD: 135 CELLS/UL

## 2024-10-17 PROCEDURE — 6360000002 HC RX W HCPCS

## 2024-10-17 PROCEDURE — 6360000002 HC RX W HCPCS: Performed by: SPECIALIST

## 2024-10-17 PROCEDURE — 83615 LACTATE (LD) (LDH) ENZYME: CPT

## 2024-10-17 PROCEDURE — 49083 ABD PARACENTESIS W/IMAGING: CPT

## 2024-10-17 PROCEDURE — 87070 CULTURE OTHR SPECIMN AEROBIC: CPT

## 2024-10-17 PROCEDURE — 82042 OTHER SOURCE ALBUMIN QUAN EA: CPT

## 2024-10-17 PROCEDURE — P9047 ALBUMIN (HUMAN), 25%, 50ML: HCPCS | Performed by: SPECIALIST

## 2024-10-17 PROCEDURE — 84157 ASSAY OF PROTEIN OTHER: CPT

## 2024-10-17 PROCEDURE — 82150 ASSAY OF AMYLASE: CPT

## 2024-10-17 PROCEDURE — 89051 BODY FLUID CELL COUNT: CPT

## 2024-10-17 PROCEDURE — 87205 SMEAR GRAM STAIN: CPT

## 2024-10-17 PROCEDURE — 2709999900 IR US GUIDED PARACENTESIS

## 2024-10-17 PROCEDURE — 87075 CULTR BACTERIA EXCEPT BLOOD: CPT

## 2024-10-17 PROCEDURE — 82945 GLUCOSE OTHER FLUID: CPT

## 2024-10-17 PROCEDURE — P9047 ALBUMIN (HUMAN), 25%, 50ML: HCPCS

## 2024-10-17 RX ORDER — ALBUMIN (HUMAN) 12.5 G/50ML
SOLUTION INTRAVENOUS CONTINUOUS PRN
Status: COMPLETED | OUTPATIENT
Start: 2024-10-17 | End: 2024-10-17

## 2024-10-17 RX ADMIN — ALBUMIN (HUMAN) 37.5 G: 0.25 INJECTION, SOLUTION INTRAVENOUS at 10:44

## 2024-10-17 NOTE — PROGRESS NOTES
TRANSFER - OUT REPORT:    Verbal report given to JANNETH Retana on Boby Glasgow being transferred to IR holding for routine post-op       Report consisted of patient's Situation, Background, Assessment and   Recommendations(SBAR).     Information from the following report(s) Nurse Handoff Report was reviewed with the receiving nurse.    Opportunity for questions and clarification was provided.      Patient transported with:   Registered Nurse

## 2024-10-21 LAB — NON-GYN CYTOLOGY REPORT: NORMAL

## 2024-10-21 NOTE — PROGRESS NOTES
Unable to LM on any of patient's #, they are not working at this time. LM on his contact's # with instructions:    IR Procedure at [facility]:  10/24/24 @  0930, arrival 0830    You may eat a light breakfast      Follow your directions as prescribed by the doctor for your procedure and medications.  3.   Consult your provider as to when to stop blood thinner  4.   Do not take any pain medication within 6 hours of your procedure  5.   Do not drink any alcoholic beverages or use any street drugs 24 hours before procedure.  6.   Please wear simple, loose fitting clothing to the hospital.  Do not bring valuables (money,             credit cards, checkbooks, etc.)     7.   If you  have a Living Will and Durable Power of  for Healthcare, please bring in a copy.  8.   Please bring picture ID,  insurance card, paperwork from the doctors office            (H & P, Consent,  & card for implantable devices).  9.   Report to the information desk on the ground floor.  10. Take a shower the night before or morning of your procedure, do not apply any lotion, oil or powder.

## 2024-10-22 RX ORDER — LACTULOSE 10 G/15ML
SOLUTION ORAL
Qty: 270 ML | Refills: 11 | Status: SHIPPED | OUTPATIENT
Start: 2024-10-22

## 2024-10-24 ENCOUNTER — HOSPITAL ENCOUNTER (OUTPATIENT)
Dept: INTERVENTIONAL RADIOLOGY/VASCULAR | Age: 79
Discharge: HOME | End: 2024-10-24
Payer: COMMERCIAL

## 2024-10-24 VITALS
DIASTOLIC BLOOD PRESSURE: 76 MMHG | RESPIRATION RATE: 16 BRPM | HEART RATE: 86 BPM | OXYGEN SATURATION: 95 % | SYSTOLIC BLOOD PRESSURE: 146 MMHG

## 2024-10-24 DIAGNOSIS — R18.8 PANCREATIC ASCITES: ICD-10-CM

## 2024-10-24 LAB
ALBUMIN FLD-MCNC: 0.7 G/DL
AMYLASE FLD-CCNC: 14 U/L
APPEARANCE FLD: NORMAL
BLASTS NFR FLD: 0 %
BODY FLD TYPE: NORMAL
CLOT CHECK: NORMAL
COLOR FLD: YELLOW
EOSINOPHIL NFR FLD: 0 %
ERYTHROCYTE [DISTWIDTH] IN BLOOD BY AUTOMATED COUNT: 17 % (ref 11.7–14.9)
GLUCOSE FLD-MCNC: 120 MG/DL
HCT VFR BLD AUTO: 26.6 % (ref 42–52)
HGB BLD-MCNC: 8 G/DL (ref 13.5–18)
INR PPP: 1.2
LYMPHOCYTES NFR FLD: 23 %
MCH RBC QN AUTO: 25.6 PG (ref 27–31)
MCHC RBC AUTO-ENTMCNC: 30.1 G/DL (ref 32–36)
MCV RBC AUTO: 85.3 FL (ref 78–100)
MONOCYTES NFR FLD: 71 %
NEUTROPHILS NFR FLD: 6 %
PARTIAL THROMBOPLASTIN TIME: 31.2 SEC (ref 25.1–37.1)
PLATELET # BLD AUTO: 247 K/UL (ref 140–440)
PMV BLD AUTO: 9.4 FL (ref 7.5–11.1)
PROT FLD-MCNC: 1.3 G/DL
PROTHROMBIN TIME: 14.9 SEC (ref 11.7–14.5)
RBC # BLD AUTO: 3.12 M/UL (ref 4.6–6.2)
RBC # FLD: <2000 CELLS/UL
SPECIMEN TYPE: NORMAL
UNIDENT CELLS NFR FLD: 0 %
WBC # FLD: 133 CELLS/UL
WBC OTHER # BLD: 6.4 K/UL (ref 4–10.5)

## 2024-10-24 PROCEDURE — 82042 OTHER SOURCE ALBUMIN QUAN EA: CPT

## 2024-10-24 PROCEDURE — 49083 ABD PARACENTESIS W/IMAGING: CPT

## 2024-10-24 PROCEDURE — 82945 GLUCOSE OTHER FLUID: CPT

## 2024-10-24 PROCEDURE — 88305 TISSUE EXAM BY PATHOLOGIST: CPT

## 2024-10-24 PROCEDURE — 84157 ASSAY OF PROTEIN OTHER: CPT

## 2024-10-24 PROCEDURE — P9047 ALBUMIN (HUMAN), 25%, 50ML: HCPCS

## 2024-10-24 PROCEDURE — 87075 CULTR BACTERIA EXCEPT BLOOD: CPT

## 2024-10-24 PROCEDURE — 87070 CULTURE OTHR SPECIMN AEROBIC: CPT

## 2024-10-24 PROCEDURE — 2709999900 IR US GUIDED PARACENTESIS

## 2024-10-24 PROCEDURE — 85027 COMPLETE CBC AUTOMATED: CPT

## 2024-10-24 PROCEDURE — 7100000010 HC PHASE II RECOVERY - FIRST 15 MIN

## 2024-10-24 PROCEDURE — 82150 ASSAY OF AMYLASE: CPT

## 2024-10-24 PROCEDURE — 89051 BODY FLUID CELL COUNT: CPT

## 2024-10-24 PROCEDURE — P9047 ALBUMIN (HUMAN), 25%, 50ML: HCPCS | Performed by: RADIOLOGY

## 2024-10-24 PROCEDURE — 6360000002 HC RX W HCPCS

## 2024-10-24 PROCEDURE — 85610 PROTHROMBIN TIME: CPT

## 2024-10-24 PROCEDURE — 6360000002 HC RX W HCPCS: Performed by: RADIOLOGY

## 2024-10-24 PROCEDURE — 87205 SMEAR GRAM STAIN: CPT

## 2024-10-24 PROCEDURE — 85730 THROMBOPLASTIN TIME PARTIAL: CPT

## 2024-10-24 PROCEDURE — 88108 CYTOPATH CONCENTRATE TECH: CPT

## 2024-10-24 PROCEDURE — 2500000003 HC RX 250 WO HCPCS: Performed by: RADIOLOGY

## 2024-10-24 RX ORDER — LIDOCAINE HYDROCHLORIDE 10 MG/ML
INJECTION, SOLUTION EPIDURAL; INFILTRATION; INTRACAUDAL; PERINEURAL PRN
Status: COMPLETED | OUTPATIENT
Start: 2024-10-24 | End: 2024-10-24

## 2024-10-24 RX ORDER — ALBUMIN (HUMAN) 12.5 G/50ML
SOLUTION INTRAVENOUS CONTINUOUS PRN
Status: COMPLETED | OUTPATIENT
Start: 2024-10-24 | End: 2024-10-24

## 2024-10-24 RX ADMIN — LIDOCAINE HYDROCHLORIDE 8 ML: 10 INJECTION, SOLUTION EPIDURAL; INFILTRATION; INTRACAUDAL; PERINEURAL at 11:32

## 2024-10-24 RX ADMIN — ALBUMIN (HUMAN) 50 G: 0.25 INJECTION, SOLUTION INTRAVENOUS at 12:01

## 2024-10-24 NOTE — OR NURSING
Paracentesis completed.  7050cc of pale yellow hazy fluid removed.  Sent to lab.  50 grams of Albumin given.

## 2024-10-24 NOTE — BRIEF OP NOTE
Department of Interventional Radiology Post-Procedure Note      Date: 10/24/2024     Interventional Radiologist: Constantine    Procedure Performed:  paracentesis    H&P Status: H&P was reviewed, the patient was examined and no change has occurred in patient's condition since H&P was completed.    Pre-procedure Diagnosis:  Ascites    Post-procedure Diagnosis:  Same    Sedation:  none    Contrast used:  none    Estimated blood loss:  Minimal    Preliminary Findings:  Successful    Complications:  none    Full Report to Follow.    Electronically signed by Jennifer Fitch MD on 10/24/2024 at 12:40 PM

## 2024-10-24 NOTE — CONSULTS
Consult Interventional Radiology    Date:10/24/2024  Name:Boby Glasgow   :1945   MR#:[id number]    SEX:male     Planned procedure:  paracentesis  Indication: ascites    H&P Status: H&P was reviewed, the patient was examined and no change has occurred in patient's condition since H&P was completed.    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  Initially treated with vancomycin but had a supratherapeutic level, hence, it was substituted by daptomycin.  Cumulative 6-week course of therapy will be completed on 3/13/2023.    Anxiety     Chronic heart failure, unspecified heart failure type (HCC) 2023    Chronic heart failure, unspecified heart failure type (HCC) 2023    Cirrhosis, alcoholic (HCC)     Diabetes mellitus (HCC)     GERD (gastroesophageal reflux disease)     GERD (gastroesophageal reflux disease)     Hyperlipidemia     Hypertension     Meniere's disease     Portal hypertension (HCC)     Pulmonary nodules     Secondary esophageal varices without bleeding (HCC)     Sleep apnea     SOB (shortness of breath)     Thyroid disease         Past Surgical History:  Past Surgical History:   Procedure Laterality Date    APPENDECTOMY      CHOLECYSTECTOMY      COLONOSCOPY      ENDOSCOPY, COLON, DIAGNOSTIC      FOOT SURGERY      needle removed,not sure which foot, per wife.    TONSILLECTOMY      UPPER GASTROINTESTINAL ENDOSCOPY N/A 2022    EGD BIOPSY performed by Travis Phelan MD at [facility] ENDOSCOPY    VASECTOMY         Social History:  Social History     Socioeconomic History    Marital status:      Spouse name: Not on file    Number of children: Not on file    Years of education: Not on file    Highest education level: Not on file   Occupational History     Comment: Retired    Tobacco Use    Smoking status: Former     Current packs/day: 0.00

## 2024-10-24 NOTE — PROGRESS NOTES
Pt arrived back to IR Holding room 9.  Vitals WNL.  Albumin gtt completed.  IV removed.  Waiting on volunteer for discharge.

## 2024-10-25 LAB — NON-GYN CYTOLOGY REPORT: NORMAL

## 2024-10-25 NOTE — PROGRESS NOTES
IR Procedure at [facility]: Spoke with patient's wife Gaby and patient will  arrive at 0900 at [facility] on 10/31/2024 for his procedure at 1000, patient has been given below instructions and will take his medications as scheduled.    NPO at Midnight      Follow your directions as prescribed by the doctor for your procedure and medications.  3.   Consult your provider as to when to stop blood thinner  4.   Do not take any pain medication within 6 hours of your procedure  5.   Do not drink any alcoholic beverages or use any street drugs 24 hours before procedure.  6.   Please wear simple, loose fitting clothing to the hospital.  Do not bring valuables (money,             credit cards, checkbooks, etc.)     7.   If you  have a Living Will and Durable Power of  for Healthcare, please bring in a copy.  8.   Please bring picture ID,  insurance card, paperwork from the doctors office            (H & P, Consent,  & card for implantable devices).  9.   Report to the information desk on the ground floor.  10. Take a shower the night before or morning of your procedure, do not apply any lotion, oil or powder.

## 2024-10-31 ENCOUNTER — HOSPITAL ENCOUNTER (OUTPATIENT)
Dept: INTERVENTIONAL RADIOLOGY/VASCULAR | Age: 79
Discharge: HOME | End: 2024-10-31
Payer: COMMERCIAL

## 2024-10-31 VITALS
DIASTOLIC BLOOD PRESSURE: 68 MMHG | HEART RATE: 80 BPM | RESPIRATION RATE: 13 BRPM | OXYGEN SATURATION: 93 % | SYSTOLIC BLOOD PRESSURE: 137 MMHG

## 2024-10-31 DIAGNOSIS — R18.8 PANCREATIC ASCITES: ICD-10-CM

## 2024-10-31 LAB
ALBUMIN FLD-MCNC: 0.6 G/DL
ERYTHROCYTE [DISTWIDTH] IN BLOOD BY AUTOMATED COUNT: 17.2 % (ref 11.7–14.9)
GLUCOSE FLD-MCNC: 116 MG/DL
HCT VFR BLD AUTO: 26.2 % (ref 42–52)
HGB BLD-MCNC: 8 G/DL (ref 13.5–18)
INR PPP: 1.2
LDH FLD L TO P-CCNC: 44 U/L
MCH RBC QN AUTO: 25.9 PG (ref 27–31)
MCHC RBC AUTO-ENTMCNC: 30.5 G/DL (ref 32–36)
MCV RBC AUTO: 84.8 FL (ref 78–100)
PARTIAL THROMBOPLASTIN TIME: 35.5 SEC (ref 25.1–37.1)
PH FLUID: 8 (ref 6.5–7.5)
PLATELET # BLD AUTO: 232 K/UL (ref 140–440)
PMV BLD AUTO: 10.2 FL (ref 7.5–11.1)
PROT FLD-MCNC: 1.3 G/DL
PROTHROMBIN TIME: 15.2 SEC (ref 11.7–14.5)
RBC # BLD AUTO: 3.09 M/UL (ref 4.6–6.2)
SPECIMEN TYPE: ABNORMAL
SPECIMEN TYPE: NORMAL
WBC OTHER # BLD: 5.7 K/UL (ref 4–10.5)

## 2024-10-31 PROCEDURE — 6360000002 HC RX W HCPCS

## 2024-10-31 PROCEDURE — 6360000002 HC RX W HCPCS: Performed by: SPECIALIST

## 2024-10-31 PROCEDURE — C1729 CATH, DRAINAGE: HCPCS

## 2024-10-31 PROCEDURE — 87205 SMEAR GRAM STAIN: CPT

## 2024-10-31 PROCEDURE — 82042 OTHER SOURCE ALBUMIN QUAN EA: CPT

## 2024-10-31 PROCEDURE — 85730 THROMBOPLASTIN TIME PARTIAL: CPT

## 2024-10-31 PROCEDURE — 88108 CYTOPATH CONCENTRATE TECH: CPT

## 2024-10-31 PROCEDURE — 49083 ABD PARACENTESIS W/IMAGING: CPT

## 2024-10-31 PROCEDURE — 83986 ASSAY PH BODY FLUID NOS: CPT

## 2024-10-31 PROCEDURE — 2500000003 HC RX 250 WO HCPCS: Performed by: STUDENT IN AN ORGANIZED HEALTH CARE EDUCATION/TRAINING PROGRAM

## 2024-10-31 PROCEDURE — 87070 CULTURE OTHR SPECIMN AEROBIC: CPT

## 2024-10-31 PROCEDURE — 82945 GLUCOSE OTHER FLUID: CPT

## 2024-10-31 PROCEDURE — P9047 ALBUMIN (HUMAN), 25%, 50ML: HCPCS

## 2024-10-31 PROCEDURE — 87075 CULTR BACTERIA EXCEPT BLOOD: CPT

## 2024-10-31 PROCEDURE — 85610 PROTHROMBIN TIME: CPT

## 2024-10-31 PROCEDURE — P9047 ALBUMIN (HUMAN), 25%, 50ML: HCPCS | Performed by: SPECIALIST

## 2024-10-31 PROCEDURE — 88305 TISSUE EXAM BY PATHOLOGIST: CPT

## 2024-10-31 PROCEDURE — 84157 ASSAY OF PROTEIN OTHER: CPT

## 2024-10-31 PROCEDURE — 83615 LACTATE (LD) (LDH) ENZYME: CPT

## 2024-10-31 PROCEDURE — 85027 COMPLETE CBC AUTOMATED: CPT

## 2024-10-31 RX ORDER — LIDOCAINE HYDROCHLORIDE 10 MG/ML
INJECTION, SOLUTION EPIDURAL; INFILTRATION; INTRACAUDAL; PERINEURAL PRN
Status: COMPLETED | OUTPATIENT
Start: 2024-10-31 | End: 2024-10-31

## 2024-10-31 RX ORDER — ALBUMIN (HUMAN) 12.5 G/50ML
SOLUTION INTRAVENOUS CONTINUOUS PRN
Status: COMPLETED | OUTPATIENT
Start: 2024-10-31 | End: 2024-10-31

## 2024-10-31 RX ADMIN — ALBUMIN (HUMAN) 37.5 G: 0.25 INJECTION, SOLUTION INTRAVENOUS at 10:29

## 2024-10-31 RX ADMIN — LIDOCAINE HYDROCHLORIDE 10 ML: 10 INJECTION, SOLUTION EPIDURAL; INFILTRATION; INTRACAUDAL; PERINEURAL at 10:06

## 2024-10-31 NOTE — PROGRESS NOTES
TRANSFER - OUT REPORT: Paracentesis complete, 5650ml of cloudy yellow ascitic fluid removed, Albumin given per protocol, fluid sent to lab, dressing clean dry and intact.     Verbal report given to Adela Glasgow     Report consisted of patient's Situation, Background, Assessment and   Recommendations(SBAR).     Information from the following report(s) Nurse Handoff Report was reviewed with the receiving nurse.    Opportunity for questions and clarification was provided.      Patient transported with:   Registered Nurse

## 2024-10-31 NOTE — PROGRESS NOTES
Returned from IR paracentesis. Pt. tolerated well. Denies pain or needs at this time. ABD dressing is clean, dry, intact. Ready for discharge.

## 2024-11-01 LAB — NON-GYN CYTOLOGY REPORT: NORMAL

## 2024-11-01 NOTE — H&P
Consult Interventional Radiology    Date:10/31/2024  Name:Boby Glasgow   :1945   MR#:[id number]    SEX:male     Planned procedure:  Paracentesis.  Indication: Ascites    H&P Status: H&P was reviewed, the patient was examined and no change has occurred in patient's condition since H&P was completed.    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  Initially treated with vancomycin but had a supratherapeutic level, hence, it was substituted by daptomycin.  Cumulative 6-week course of therapy will be completed on 3/13/2023.    Anxiety     Chronic heart failure, unspecified heart failure type (HCC) 2023    Chronic heart failure, unspecified heart failure type (HCC) 2023    Cirrhosis, alcoholic (HCC)     Diabetes mellitus (HCC)     GERD (gastroesophageal reflux disease)     GERD (gastroesophageal reflux disease)     Hyperlipidemia     Hypertension     Meniere's disease     Portal hypertension (HCC)     Pulmonary nodules     Secondary esophageal varices without bleeding (HCC)     Sleep apnea     SOB (shortness of breath)     Thyroid disease         Past Surgical History:  Past Surgical History:   Procedure Laterality Date    APPENDECTOMY      CHOLECYSTECTOMY      COLONOSCOPY      ENDOSCOPY, COLON, DIAGNOSTIC      FOOT SURGERY      needle removed,not sure which foot, per wife.    TONSILLECTOMY      UPPER GASTROINTESTINAL ENDOSCOPY N/A 2022    EGD BIOPSY performed by Travis Phelan MD at [facility] ENDOSCOPY    VASECTOMY         Social History:  Social History     Socioeconomic History    Marital status:      Spouse name: Not on file    Number of children: Not on file    Years of education: Not on file    Highest education level: Not on file   Occupational History     Comment: Retired    Tobacco Use    Smoking status: Former     Current packs/day: 0.00

## 2024-11-01 NOTE — BRIEF OP NOTE
Department of Interventional Radiology Post-Procedure Note      Date: 10/31/2024     Interventional Radiologist: Clinton Razo    Successful image guided paracentesis.    Full Report to Follow.    Electronically signed by Clinton Razo MD on 10/31/2024 at 8:12 PM

## 2024-11-04 NOTE — PROGRESS NOTES
IR Procedure at [facility]: Spoke with patient's wife Gaby and patient will arrive at 0900 at [facility] on 11/7/2024 for his procedure at 1000. Patient will take his medications as scheduled and has been given below instructions.    NPO at Midnight      Follow your directions as prescribed by the doctor for your procedure and medications.  3.   Consult your provider as to when to stop blood thinner  4.   Do not take any pain medication within 6 hours of your procedure  5.   Do not drink any alcoholic beverages or use any street drugs 24 hours before procedure.  6.   Please wear simple, loose fitting clothing to the hospital.  Do not bring valuables (money,             credit cards, checkbooks, etc.)     7.   If you  have a Living Will and Durable Power of  for Healthcare, please bring in a copy.  8.   Please bring picture ID,  insurance card, paperwork from the doctors office            (H & P, Consent,  & card for implantable devices).  9.   Report to the information desk on the ground floor.  10. Take a shower the night before or morning of your procedure, do not apply any lotion, oil or powder.

## 2024-11-07 ENCOUNTER — HOSPITAL ENCOUNTER (OUTPATIENT)
Dept: INTERVENTIONAL RADIOLOGY/VASCULAR | Age: 79
Discharge: HOME | End: 2024-11-07
Payer: COMMERCIAL

## 2024-11-07 VITALS
SYSTOLIC BLOOD PRESSURE: 122 MMHG | RESPIRATION RATE: 16 BRPM | HEART RATE: 92 BPM | OXYGEN SATURATION: 96 % | DIASTOLIC BLOOD PRESSURE: 84 MMHG

## 2024-11-07 DIAGNOSIS — R18.8 PANCREATIC ASCITES: ICD-10-CM

## 2024-11-07 LAB
ALBUMIN FLD-MCNC: 0.6 G/DL
APPEARANCE FLD: NORMAL
BLASTS NFR FLD: 0 %
BODY FLD TYPE: NORMAL
CLOT CHECK: NORMAL
COLOR FLD: NORMAL
EOSINOPHIL NFR FLD: 0 %
LYMPHOCYTES NFR FLD: 14 %
MESOTHELIAL CELLS BODY FLUID: 7 %
MONOCYTES NFR FLD: 81 %
NEUTROPHILS NFR FLD: 5 %
PROT FLD-MCNC: 1.3 G/DL
RBC # FLD: 2000 CELLS/UL
SPECIMEN TYPE: NORMAL
SPECIMEN TYPE: NORMAL
UNIDENT CELLS NFR FLD: 0 %
WBC # FLD: 120 CELLS/UL

## 2024-11-07 PROCEDURE — 82042 OTHER SOURCE ALBUMIN QUAN EA: CPT

## 2024-11-07 PROCEDURE — 87205 SMEAR GRAM STAIN: CPT

## 2024-11-07 PROCEDURE — P9047 ALBUMIN (HUMAN), 25%, 50ML: HCPCS

## 2024-11-07 PROCEDURE — 6360000002 HC RX W HCPCS: Performed by: RADIOLOGY

## 2024-11-07 PROCEDURE — 87070 CULTURE OTHR SPECIMN AEROBIC: CPT

## 2024-11-07 PROCEDURE — 84157 ASSAY OF PROTEIN OTHER: CPT

## 2024-11-07 PROCEDURE — 87075 CULTR BACTERIA EXCEPT BLOOD: CPT

## 2024-11-07 PROCEDURE — 89051 BODY FLUID CELL COUNT: CPT

## 2024-11-07 PROCEDURE — 2709999900 IR US GUIDED PARACENTESIS

## 2024-11-07 PROCEDURE — 2500000003 HC RX 250 WO HCPCS: Performed by: RADIOLOGY

## 2024-11-07 PROCEDURE — P9047 ALBUMIN (HUMAN), 25%, 50ML: HCPCS | Performed by: RADIOLOGY

## 2024-11-07 PROCEDURE — 49083 ABD PARACENTESIS W/IMAGING: CPT

## 2024-11-07 PROCEDURE — 6360000002 HC RX W HCPCS

## 2024-11-07 RX ORDER — ALBUMIN (HUMAN) 12.5 G/50ML
SOLUTION INTRAVENOUS CONTINUOUS PRN
Status: COMPLETED | OUTPATIENT
Start: 2024-11-07 | End: 2024-11-07

## 2024-11-07 RX ORDER — LIDOCAINE HYDROCHLORIDE AND EPINEPHRINE BITARTRATE 20; .01 MG/ML; MG/ML
INJECTION, SOLUTION SUBCUTANEOUS PRN
Status: COMPLETED | OUTPATIENT
Start: 2024-11-07 | End: 2024-11-07

## 2024-11-07 RX ORDER — LIDOCAINE HYDROCHLORIDE 10 MG/ML
INJECTION, SOLUTION EPIDURAL; INFILTRATION; INTRACAUDAL; PERINEURAL PRN
Status: COMPLETED | OUTPATIENT
Start: 2024-11-07 | End: 2024-11-07

## 2024-11-07 RX ADMIN — ALBUMIN (HUMAN) 62.5 G: 0.25 INJECTION, SOLUTION INTRAVENOUS at 10:30

## 2024-11-07 RX ADMIN — LIDOCAINE HYDROCHLORIDE,EPINEPHRINE BITARTRATE 10 ML: 20; .01 INJECTION, SOLUTION INFILTRATION; PERINEURAL at 10:02

## 2024-11-07 RX ADMIN — LIDOCAINE HYDROCHLORIDE 10 ML: 10 INJECTION, SOLUTION EPIDURAL; INFILTRATION; INTRACAUDAL; PERINEURAL at 10:02

## 2024-11-07 NOTE — PROGRESS NOTES
TRANSFER - OUT REPORT:    Verbal report given to Mili RN and Cammy RN on Boby Glasgow being transferred to IR holding for routine post-op       Report consisted of patient's Situation, Background, Assessment and   Recommendations(SBAR).     Information from the following report(s) Nurse Handoff Report was reviewed with the receiving nurse.    Opportunity for questions and clarification was provided.      Patient transported with:   Registered Nurse    Successful paracentesis. 9650cc of milky white fluid was drained. 700cc was sent to the labe. 62.5g of albumin was given per order.

## 2024-11-07 NOTE — BRIEF OP NOTE
Brief Postoperative Note      Patient: Boby Glasgow  YOB: 1945  MRN: [id number]    Date of Procedure: 11/7/2024  Ascites    Post-Op Diagnosis: Same  US guided paracentesis    EMY Patel    Assistant:  * No surgical staff found *    Anesthesia: * No anesthesia type entered *    Estimated Blood Loss (mL): Minimal    Complications: None    Specimens:   * No specimens in log *    Implants:  * No implants in log *      Drains: * No LDAs found *    Findings:  Infection Present At Time Of Surgery (PATOS) (choose all levels that have infection present):  No infection present  Other Findings: 7000 ccs of ascites    Electronically signed by Pee Patel MD on 11/7/2024 at 1:19 PM

## 2024-11-07 NOTE — PROGRESS NOTES
Pt arrived back to IR holding room 5.  Vitals WNL.  Discharge instructions and paperwork provided.  Volunteer will assist pt to front door.

## 2024-11-07 NOTE — PRE SEDATION
Sedation Pre-Procedure Note    Patient Name: Boby Glasgow   YOB: 1945  Room/Bed: Room/bed info not found  Medical Record Number: [id number]  Date: 11/7/2024   Time: 1:18 PM       Indication:  ABDOMINAL DISTENSION    Consent: I have discussed with the patient and/or the patient representative the indication, alternatives, and the possible risks and/or complications of the planned procedure and the anesthesia methods. The patient and/or patient representative appear to understand and agree to proceed.    Vital Signs:   Vitals:    11/07/24 1051   BP: 122/84   Pulse: 92   Resp:    SpO2: 96%       Past Medical History:   has a past medical history of Acute bacterial endocarditis, Anxiety, Chronic heart failure, unspecified heart failure type (HCC), Chronic heart failure, unspecified heart failure type (HCC), Cirrhosis, alcoholic (HCC), Diabetes mellitus (HCC), GERD (gastroesophageal reflux disease), GERD (gastroesophageal reflux disease), Hyperlipidemia, Hypertension, Meniere's disease, Portal hypertension (HCC), Pulmonary nodules, Secondary esophageal varices without bleeding (HCC), Sleep apnea, SOB (shortness of breath), and Thyroid disease.    Past Surgical History:   has a past surgical history that includes Cholecystectomy; Vasectomy; Colonoscopy; Endoscopy, colon, diagnostic; Foot surgery; Upper gastrointestinal endoscopy (N/A, 1/19/2022); Tonsillectomy; and Appendectomy.    Medications:   Scheduled Meds:   Continuous Infusions:   PRN Meds:   Home Meds:   Prior to Admission medications    Medication Sig Start Date End Date Taking? Authorizing Provider   lactulose (CONSTULOSE) 10 GM/15ML solution TAKE 30ML BY MOUTH THREE TIMES DAILY (HOLD IF HAVING DIARRHEA) 10/22/24  Yes Travis Phelan MD   furosemide (LASIX) 40 MG tablet Take 1 tablet by mouth once daily 10/15/24  Yes Paul Baeza MD   rifAXIMin (XIFAXAN) 550 MG tablet Take 1 tablet by mouth 2 times daily 9/27/24  Yes Travis Phelan

## 2024-11-15 ENCOUNTER — HOSPITAL ENCOUNTER (OUTPATIENT)
Dept: INTERVENTIONAL RADIOLOGY/VASCULAR | Age: 79
Discharge: HOME | End: 2024-11-15
Payer: COMMERCIAL

## 2024-11-15 VITALS
HEART RATE: 95 BPM | HEIGHT: 68 IN | OXYGEN SATURATION: 97 % | BODY MASS INDEX: 38.8 KG/M2 | WEIGHT: 256 LBS | DIASTOLIC BLOOD PRESSURE: 76 MMHG | RESPIRATION RATE: 16 BRPM | TEMPERATURE: 98.2 F | SYSTOLIC BLOOD PRESSURE: 136 MMHG

## 2024-11-15 DIAGNOSIS — C73 THYROID CANCER (HCC): ICD-10-CM

## 2024-11-15 LAB
ALBUMIN FLD-MCNC: 0.7 G/DL
APPEARANCE FLD: NORMAL
BODY FLD TYPE: NORMAL
CLOT CHECK: NORMAL
COLOR FLD: YELLOW
MONOCYTES NFR FLD: 83 %
NEUTROPHILS NFR FLD: 18 %
PROT FLD-MCNC: 1.3 G/DL
RBC # FLD: <2000 CELLS/UL
SPECIMEN TYPE: NORMAL
SPECIMEN TYPE: NORMAL
WBC # FLD: 120 CELLS/UL

## 2024-11-15 PROCEDURE — 6360000002 HC RX W HCPCS: Performed by: RADIOLOGY

## 2024-11-15 PROCEDURE — 84157 ASSAY OF PROTEIN OTHER: CPT

## 2024-11-15 PROCEDURE — 6360000002 HC RX W HCPCS

## 2024-11-15 PROCEDURE — 87205 SMEAR GRAM STAIN: CPT

## 2024-11-15 PROCEDURE — 2500000003 HC RX 250 WO HCPCS: Performed by: RADIOLOGY

## 2024-11-15 PROCEDURE — 89051 BODY FLUID CELL COUNT: CPT

## 2024-11-15 PROCEDURE — 87075 CULTR BACTERIA EXCEPT BLOOD: CPT

## 2024-11-15 PROCEDURE — 87070 CULTURE OTHR SPECIMN AEROBIC: CPT

## 2024-11-15 PROCEDURE — P9047 ALBUMIN (HUMAN), 25%, 50ML: HCPCS | Performed by: RADIOLOGY

## 2024-11-15 PROCEDURE — 82042 OTHER SOURCE ALBUMIN QUAN EA: CPT

## 2024-11-15 PROCEDURE — 2709999900 IR US GUIDED PARACENTESIS

## 2024-11-15 PROCEDURE — 49083 ABD PARACENTESIS W/IMAGING: CPT

## 2024-11-15 PROCEDURE — P9047 ALBUMIN (HUMAN), 25%, 50ML: HCPCS

## 2024-11-15 RX ORDER — LIDOCAINE HYDROCHLORIDE 10 MG/ML
INJECTION, SOLUTION EPIDURAL; INFILTRATION; INTRACAUDAL; PERINEURAL PRN
Status: COMPLETED | OUTPATIENT
Start: 2024-11-15 | End: 2024-11-15

## 2024-11-15 RX ORDER — ALBUMIN (HUMAN) 12.5 G/50ML
SOLUTION INTRAVENOUS CONTINUOUS PRN
Status: COMPLETED | OUTPATIENT
Start: 2024-11-15 | End: 2024-11-15

## 2024-11-15 RX ADMIN — LIDOCAINE HYDROCHLORIDE 10 ML: 10 INJECTION, SOLUTION EPIDURAL; INFILTRATION; INTRACAUDAL; PERINEURAL at 13:18

## 2024-11-15 RX ADMIN — ALBUMIN (HUMAN) 62.5 G: 0.25 INJECTION, SOLUTION INTRAVENOUS at 13:56

## 2024-11-15 ASSESSMENT — PAIN - FUNCTIONAL ASSESSMENT: PAIN_FUNCTIONAL_ASSESSMENT: NONE - DENIES PAIN

## 2024-11-15 NOTE — CONSULTS
Hands drawn involuntarily       Medications:  Current Outpatient Medications on File Prior to Encounter   Medication Sig Dispense Refill    lactulose (CONSTULOSE) 10 GM/15ML solution TAKE 30ML BY MOUTH THREE TIMES DAILY (HOLD IF HAVING DIARRHEA) 270 mL 11    furosemide (LASIX) 40 MG tablet Take 1 tablet by mouth once daily 60 tablet 0    rifAXIMin (XIFAXAN) 550 MG tablet Take 1 tablet by mouth 2 times daily 180 tablet 3    DULoxetine (CYMBALTA) 60 MG extended release capsule Take 1 capsule by mouth once daily 90 capsule 0    levothyroxine (SYNTHROID) 50 MCG tablet Take 1 tablet by mouth once daily 90 tablet 1    spironolactone (ALDACTONE) 100 MG tablet Take 1 tablet by mouth twice daily 60 tablet 0    albuterol sulfate HFA (PROVENTIL;VENTOLIN;PROAIR) 108 (90 Base) MCG/ACT inhaler INHALE 2 PUFFS BY MOUTH 4 TIMES DAILY AS NEEDED FOR WHEEZING 1 each 2    Multiple Vitamin (MULTI VITAMIN MENS PO) Take 1 tablet by mouth daily      Omega-3 1000 MG CAPS Take 2 capsules by mouth daily       No current facility-administered medications on file prior to encounter.       Review of Systems:  A 10 point review of systems was conducted and was negative with the exception of what is noted above.     Vital Signs:  Vitals:    11/15/24 1330 11/15/24 1340 11/15/24 1350 11/15/24 1400   BP: (!) 143/68 (!) 144/69 (!) 144/69 136/76   Pulse: 91 97 91 95   Resp: 16 16 16    Temp:       TempSrc:       SpO2: 98% 95% 90% 97%   Weight:       Height:            Laboratory:  No results for input(s): \"WBC\", \"NA\", \"POTASSIUM\", \"CL\", \"CO2\", \"BUN\", \"CREATININE\", \"GLUCOSE\", \"INR\", \"CKMB\" in the last 72 hours.    Invalid input(s): \"HEMOGLOBIN\", \"HEMATOCRIT\", \"PLATELETS\", \"CA\", \"PT\", \"PTT\", \"CK1\", \"TROP\"      Mallampati Score III  ASA class III    Medications reviewed: No contraindications for coagulation or sedation plans    IMAGING DATA   None available for review    ASSESSMENT AND PLAN     Pt is a good candidate for pracentesis      Procedure, RBA

## 2024-11-15 NOTE — BRIEF OP NOTE
Department of Interventional Radiology Post-Procedure Note      Date: 11/15/2024     Interventional Radiologist: Constantine    Procedure Performed:  paracentesis    H&P Status: H&P was reviewed, the patient was examined and no change has occurred in patient's condition since H&P was completed.    Pre-procedure Diagnosis:  Ascites    Post-procedure Diagnosis:  Same    Sedation:  none    Contrast used:  none    Estimated blood loss:  Minimal    Preliminary Findings:  Successful    Complications:  none    Full Report to Follow.    Electronically signed by Jennifer Fitch MD on 11/15/2024 at 4:12 PM

## 2024-11-15 NOTE — PROGRESS NOTES
IR Procedure at [facility]:  Spoke with patient's wife Gaby and patient will arrive at 1230 at [facility] on 11/15/2024 foe his procedure at 1330.Patient has been given instructions and will take his medications as scheduled.      Follow your directions as prescribed by the doctor for your procedure and medications.  3.   Consult your provider as to when to stop blood thinner  4.   Do not take any pain medication within 6 hours of your procedure  5.   Do not drink any alcoholic beverages or use any street drugs 24 hours before procedure.  6.   Please wear simple, loose fitting clothing to the hospital.  Do not bring valuables (money,             credit cards, checkbooks, etc.)     7.   If you  have a Living Will and Durable Power of  for Healthcare, please bring in a copy.  8.   Please bring picture ID,  insurance card, paperwork from the doctors office            (H & P, Consent,  & card for implantable devices).  9.   Report to the information desk on the ground floor.  10. Take a shower the night before or morning of your procedure, do not apply any lotion, oil or powder.      
Patient checked into IR holding.  Assessment completed.   
Patient returned to IR holding.   
TRANSFER - OUT REPORT:    Verbal report given to Lainey on Boby Glasgow being transferred to IR [facility] for routine progression of patient care       Report consisted of patient's Situation, Background, Assessment and   Recommendations(SBAR).     10L removed from paracentesis. Albumin replace. Vitals stable. Fluid sent to lab.    Information from the following report(s) Nurse Handoff Report was reviewed with the receiving nurse.    Opportunity for questions and clarification was provided.      Patient transported with:   Registered Nurse    
To holding area.  Assessment completed and questions answered. Call light in reach.   
Statement Selected

## 2024-11-15 NOTE — PROGRESS NOTES
IR Procedure at [facility]: Left message on patient's wife Gaby's phone for patient to arrive at 1000 at [facility] on 11/21/2024 for is procedure at 1100. Patient will has been given below instructions and will take his medications as scheduled.       Follow your directions as prescribed by the doctor for your procedure and medications.  3.   Consult your provider as to when to stop blood thinner  4.   Do not take any pain medication within 6 hours of your procedure  5.   Do not drink any alcoholic beverages or use any street drugs 24 hours before procedure.  6.   Please wear simple, loose fitting clothing to the hospital.  Do not bring valuables (money,             credit cards, checkbooks, etc.)     7.   If you  have a Living Will and Durable Power of  for Healthcare, please bring in a copy.  8.   Please bring picture ID,  insurance card, paperwork from the doctors office            (H & P, Consent,  & card for implantable devices).  9.   Report to the information desk on the ground floor.  10. Take a shower the night before or morning of your procedure, do not apply any lotion, oil or powder.

## 2024-11-19 NOTE — PROGRESS NOTES
IR Procedure at [facility]:  Spoke with patient's wife Gaby and he will arrive at 1000 at [facility] on 11/27/2024 for his procedure at 1100. Patient has been given below instructions and will take his medications as scheduled.    Follow your directions as prescribed by the doctor for your procedure and medications.  3.   Consult your provider as to when to stop blood thinner  4.   Do not take any pain medication within 6 hours of your procedure  5.   Do not drink any alcoholic beverages or use any street drugs 24 hours before procedure.  6.   Please wear simple, loose fitting clothing to the hospital.  Do not bring valuables (money,             credit cards, checkbooks, etc.)     7.   If you  have a Living Will and Durable Power of  for Healthcare, please bring in a copy.  8.   Please bring picture ID,  insurance card, paperwork from the doctors office            (H & P, Consent,  & card for implantable devices).  9.   Report to the information desk on the ground floor.  10. Take a shower the night before or morning of your procedure, do not apply any lotion, oil or powder.

## 2024-11-21 ENCOUNTER — HOSPITAL ENCOUNTER (OUTPATIENT)
Dept: INTERVENTIONAL RADIOLOGY/VASCULAR | Age: 79
Discharge: HOME | End: 2024-11-21
Payer: COMMERCIAL

## 2024-11-21 VITALS
RESPIRATION RATE: 18 BRPM | WEIGHT: 245 LBS | HEIGHT: 68 IN | OXYGEN SATURATION: 97 % | DIASTOLIC BLOOD PRESSURE: 91 MMHG | BODY MASS INDEX: 37.13 KG/M2 | SYSTOLIC BLOOD PRESSURE: 154 MMHG | HEART RATE: 99 BPM

## 2024-11-21 DIAGNOSIS — R18.8 PANCREATIC ASCITES: ICD-10-CM

## 2024-11-21 LAB
ALBUMIN FLD-MCNC: 0.7 G/DL
APPEARANCE FLD: NORMAL
BLASTS NFR FLD: 0 %
BODY FLD TYPE: NORMAL
CLOT CHECK: NORMAL
COLOR FLD: YELLOW
EOSINOPHIL NFR FLD: 0 %
ERYTHROCYTE [DISTWIDTH] IN BLOOD BY AUTOMATED COUNT: 18.3 % (ref 11.7–14.9)
HCT VFR BLD AUTO: 25.9 % (ref 42–52)
HGB BLD-MCNC: 8 G/DL (ref 13.5–18)
INR PPP: 1.2
LYMPHOCYTES NFR FLD: 51 %
MCH RBC QN AUTO: 26.1 PG (ref 27–31)
MCHC RBC AUTO-ENTMCNC: 30.9 G/DL (ref 32–36)
MCV RBC AUTO: 84.4 FL (ref 78–100)
MONOCYTES NFR FLD: 33 %
NEUTROPHILS NFR FLD: 16 %
PLATELET # BLD AUTO: 244 K/UL (ref 140–440)
PMV BLD AUTO: 9.8 FL (ref 7.5–11.1)
PROT FLD-MCNC: 1.3 G/DL
PROTHROMBIN TIME: 15.3 SEC (ref 11.7–14.5)
RBC # BLD AUTO: 3.07 M/UL (ref 4.6–6.2)
RBC # FLD: <2000 CELLS/UL
SPECIMEN TYPE: NORMAL
SPECIMEN TYPE: NORMAL
UNIDENT CELLS NFR FLD: 0 %
WBC # FLD: 139 CELLS/UL
WBC OTHER # BLD: 6.9 K/UL (ref 4–10.5)

## 2024-11-21 PROCEDURE — 82042 OTHER SOURCE ALBUMIN QUAN EA: CPT

## 2024-11-21 PROCEDURE — P9047 ALBUMIN (HUMAN), 25%, 50ML: HCPCS | Performed by: STUDENT IN AN ORGANIZED HEALTH CARE EDUCATION/TRAINING PROGRAM

## 2024-11-21 PROCEDURE — 87205 SMEAR GRAM STAIN: CPT

## 2024-11-21 PROCEDURE — 85610 PROTHROMBIN TIME: CPT

## 2024-11-21 PROCEDURE — 87070 CULTURE OTHR SPECIMN AEROBIC: CPT

## 2024-11-21 PROCEDURE — P9047 ALBUMIN (HUMAN), 25%, 50ML: HCPCS

## 2024-11-21 PROCEDURE — 49083 ABD PARACENTESIS W/IMAGING: CPT

## 2024-11-21 PROCEDURE — 85027 COMPLETE CBC AUTOMATED: CPT

## 2024-11-21 PROCEDURE — 2500000003 HC RX 250 WO HCPCS: Performed by: STUDENT IN AN ORGANIZED HEALTH CARE EDUCATION/TRAINING PROGRAM

## 2024-11-21 PROCEDURE — 6360000002 HC RX W HCPCS: Performed by: STUDENT IN AN ORGANIZED HEALTH CARE EDUCATION/TRAINING PROGRAM

## 2024-11-21 PROCEDURE — 6360000002 HC RX W HCPCS

## 2024-11-21 PROCEDURE — 2709999900 IR US GUIDED PARACENTESIS

## 2024-11-21 PROCEDURE — 87075 CULTR BACTERIA EXCEPT BLOOD: CPT

## 2024-11-21 PROCEDURE — 7100000011 HC PHASE II RECOVERY - ADDTL 15 MIN

## 2024-11-21 PROCEDURE — 84157 ASSAY OF PROTEIN OTHER: CPT

## 2024-11-21 PROCEDURE — 89051 BODY FLUID CELL COUNT: CPT

## 2024-11-21 PROCEDURE — 7100000010 HC PHASE II RECOVERY - FIRST 15 MIN

## 2024-11-21 RX ORDER — ALBUMIN (HUMAN) 12.5 G/50ML
SOLUTION INTRAVENOUS CONTINUOUS PRN
Status: COMPLETED | OUTPATIENT
Start: 2024-11-21 | End: 2024-11-21

## 2024-11-21 RX ORDER — LIDOCAINE HYDROCHLORIDE 10 MG/ML
INJECTION, SOLUTION EPIDURAL; INFILTRATION; INTRACAUDAL; PERINEURAL PRN
Status: COMPLETED | OUTPATIENT
Start: 2024-11-21 | End: 2024-11-21

## 2024-11-21 RX ADMIN — ALBUMIN (HUMAN) 50 G: 0.25 INJECTION, SOLUTION INTRAVENOUS at 11:30

## 2024-11-21 RX ADMIN — LIDOCAINE HYDROCHLORIDE 6 ML: 10 INJECTION, SOLUTION EPIDURAL; INFILTRATION; INTRACAUDAL; PERINEURAL at 11:07

## 2024-11-21 ASSESSMENT — PAIN - FUNCTIONAL ASSESSMENT: PAIN_FUNCTIONAL_ASSESSMENT: NONE - DENIES PAIN

## 2024-11-21 NOTE — PROGRESS NOTES
TRANSFER - OUT REPORT:    Verbal report given to Humaira RN and Cammy RN on Boby Glasgow being transferred to IR holding for routine post-op       Report consisted of patient's Situation, Background, Assessment and   Recommendations(SBAR).     Information from the following report(s) Nurse Handoff Report was reviewed with the receiving nurse.    Opportunity for questions and clarification was provided.      Patient transported with:   Registered Nurse    Successful paracentesis. 7500cc of milky white fluid was drained. 1000cc was sent tot he lab per order.

## 2024-11-21 NOTE — BRIEF OP NOTE
Department of Interventional Radiology Post-Procedure Note      Date: 11/21/2024     Interventional Radiologist: Clinton Razo    Successful image guided paracentesis.  Tolerated well.    Full Report to Follow.    Electronically signed by Clinton Razo MD on 11/21/2024 at 5:19 PM

## 2024-11-21 NOTE — PROGRESS NOTES
Pt to post op holding following paracentesis, VSS denies pain dressing clean dry and intact, Dressing clean dry and intact.

## 2024-11-25 RX ORDER — DULOXETIN HYDROCHLORIDE 60 MG/1
60 CAPSULE, DELAYED RELEASE ORAL DAILY
Qty: 90 CAPSULE | Refills: 0 | Status: SHIPPED | OUTPATIENT
Start: 2024-11-25

## 2024-11-27 ENCOUNTER — HOSPITAL ENCOUNTER (OUTPATIENT)
Dept: INTERVENTIONAL RADIOLOGY/VASCULAR | Age: 79
Discharge: HOME | End: 2024-11-27
Payer: COMMERCIAL

## 2024-11-27 VITALS
TEMPERATURE: 98.2 F | SYSTOLIC BLOOD PRESSURE: 150 MMHG | HEART RATE: 88 BPM | OXYGEN SATURATION: 96 % | DIASTOLIC BLOOD PRESSURE: 75 MMHG | RESPIRATION RATE: 16 BRPM

## 2024-11-27 DIAGNOSIS — N18.6 ESRD (END STAGE RENAL DISEASE) (HCC): ICD-10-CM

## 2024-11-27 LAB
ALBUMIN FLD-MCNC: 0.7 G/DL
AMYLASE FLD-CCNC: 12 U/L
APPEARANCE FLD: NORMAL
BODY FLD TYPE: NORMAL
CLOT CHECK: NORMAL
COLOR FLD: YELLOW
GLUCOSE FLD-MCNC: 108 MG/DL
LDH FLD L TO P-CCNC: 40 U/L
MONOCYTES NFR FLD: 87 %
NEUTROPHILS NFR FLD: 13 %
PROT FLD-MCNC: 1.3 G/DL
RBC # FLD: <2000 CELLS/UL
SPECIMEN TYPE: NORMAL
WBC # FLD: 111 CELLS/UL

## 2024-11-27 PROCEDURE — 49083 ABD PARACENTESIS W/IMAGING: CPT | Performed by: RADIOLOGY

## 2024-11-27 PROCEDURE — 89051 BODY FLUID CELL COUNT: CPT

## 2024-11-27 PROCEDURE — P9047 ALBUMIN (HUMAN), 25%, 50ML: HCPCS | Performed by: RADIOLOGY

## 2024-11-27 PROCEDURE — 82150 ASSAY OF AMYLASE: CPT

## 2024-11-27 PROCEDURE — C1729 CATH, DRAINAGE: HCPCS

## 2024-11-27 PROCEDURE — 6360000002 HC RX W HCPCS: Performed by: RADIOLOGY

## 2024-11-27 PROCEDURE — 7100000011 HC PHASE II RECOVERY - ADDTL 15 MIN

## 2024-11-27 PROCEDURE — 82042 OTHER SOURCE ALBUMIN QUAN EA: CPT

## 2024-11-27 PROCEDURE — 7100000010 HC PHASE II RECOVERY - FIRST 15 MIN

## 2024-11-27 PROCEDURE — 84157 ASSAY OF PROTEIN OTHER: CPT

## 2024-11-27 PROCEDURE — 83615 LACTATE (LD) (LDH) ENZYME: CPT

## 2024-11-27 PROCEDURE — 49083 ABD PARACENTESIS W/IMAGING: CPT

## 2024-11-27 PROCEDURE — 87205 SMEAR GRAM STAIN: CPT

## 2024-11-27 PROCEDURE — 82945 GLUCOSE OTHER FLUID: CPT

## 2024-11-27 PROCEDURE — 87075 CULTR BACTERIA EXCEPT BLOOD: CPT

## 2024-11-27 PROCEDURE — 87070 CULTURE OTHR SPECIMN AEROBIC: CPT

## 2024-11-27 RX ORDER — ALBUMIN (HUMAN) 12.5 G/50ML
SOLUTION INTRAVENOUS CONTINUOUS PRN
Status: COMPLETED | OUTPATIENT
Start: 2024-11-27 | End: 2024-11-27

## 2024-11-27 RX ADMIN — ALBUMIN (HUMAN) 50 G: 0.25 INJECTION, SOLUTION INTRAVENOUS at 11:46

## 2024-11-27 NOTE — PROGRESS NOTES
Pt. returned from Paracentesis in IR with Dr. Fitch. Tolerated well. ABD dressing is clean, dry, intact. Preparing for discharge soon.

## 2024-11-27 NOTE — CONSULTS
Average packs/day: 1 pack/day for 14.0 years (14.0 ttl pk-yrs)     Types: Cigarettes     Start date:      Quit date:      Years since quittin.9    Smokeless tobacco: Never   Vaping Use    Vaping status: Never Used   Substance and Sexual Activity    Alcohol use: Not Currently     Alcohol/week: 6.0 standard drinks of alcohol     Types: 6 Cans of beer per week    Drug use: Never    Sexual activity: Yes     Partners: Female   Other Topics Concern    Not on file   Social History Narrative    Not on file     Social Determinants of Health     Financial Resource Strain: Low Risk  (2024)    Overall Financial Resource Strain (CARDIA)     Difficulty of Paying Living Expenses: Not hard at all   Food Insecurity: No Food Insecurity (2024)    Hunger Vital Sign     Worried About Running Out of Food in the Last Year: Never true     Ran Out of Food in the Last Year: Never true   Transportation Needs: Unknown (2024)    PRAPARE - Transportation     Lack of Transportation (Medical): Not on file     Lack of Transportation (Non-Medical): No   Physical Activity: Not on file   Stress: Not on file   Social Connections: Not on file   Intimate Partner Violence: Not on file   Housing Stability: Unknown (2024)    Housing Stability Vital Sign     Unable to Pay for Housing in the Last Year: Not on file     Number of Places Lived in the Last Year: Not on file     Unstable Housing in the Last Year: No       Family History:  Family History   Problem Relation Age of Onset    Hypertension Brother     Diabetes Other        Allergies:  Allergies   Allergen Reactions    Lisinopril Swelling     Tongue swelling      Zetia [Ezetimibe] Swelling     Facial swelling    Atorvastatin     Codeine Other (See Comments)     Blood pressure drops    Hydrochlorothiazide     Hydroxyzine      Fatigue and depressed    Lexapro [Escitalopram Oxalate]      Increased depression    Pravastatin     Daptomycin Rash and Other (See Comments)

## 2024-11-27 NOTE — PROGRESS NOTES
TRANSFER - OUT REPORT:    Verbal report given to Lainey on Boby Glasgow being transferred to IR holdingfor routine progression of patient care       Report consisted of patient's Situation, Background, Assessment and   Recommendations(SBAR).     Paracentesis complete. 6200  mL drained total. Specimen sent for analysis. Vitals stable. Site intact.     Information from the following report(s) Nurse Handoff Report was reviewed with the receiving nurse.    Opportunity for questions and clarification was provided.      Patient transported with:   Registered Nurse

## 2024-11-27 NOTE — BRIEF OP NOTE
Department of Interventional Radiology Post-Procedure Note      Date: 11/27/2024     Interventional Radiologist: Constantine    Procedure Performed:  Paracentesis    H&P Status: H&P was reviewed, the patient was examined and no change has occurred in patient's condition since H&P was completed.    Pre-procedure Diagnosis:  Ascites    Post-procedure Diagnosis:  Same    Sedation:  none    Contrast used:  none    Estimated blood loss:  Minimal    Preliminary Findings:  Successful    Complications:  none    Full Report to Follow.    Electronically signed by Jennifer Fitch MD on 11/27/2024 at 4:25 PM

## 2024-12-02 NOTE — PROGRESS NOTES
IR Procedure at [facility]:  Left a message on patient's wife Gaby's phone for patient to arrive at 1000 at [facility] on 12/5/2024 for his procedure at 1100. Patient has been given below instructions and will take his medications as scheduled.       Follow your directions as prescribed by the doctor for your procedure and medications.  3.   Consult your provider as to when to stop blood thinner  4.   Do not take any pain medication within 6 hours of your procedure  5.   Do not drink any alcoholic beverages or use any street drugs 24 hours before procedure.  6.   Please wear simple, loose fitting clothing to the hospital.  Do not bring valuables (money,             credit cards, checkbooks, etc.)     7.   If you  have a Living Will and Durable Power of  for Healthcare, please bring in a copy.  8.   Please bring picture ID,  insurance card, paperwork from the doctors office            (H & P, Consent,  & card for implantable devices).  9.   Report to the information desk on the ground floor.  10. Take a shower the night before or morning of your procedure, do not apply any lotion, oil or powder.

## 2024-12-04 LAB — NON-GYN CYTOLOGY REPORT: NORMAL

## 2024-12-05 ENCOUNTER — HOSPITAL ENCOUNTER (OUTPATIENT)
Dept: INTERVENTIONAL RADIOLOGY/VASCULAR | Age: 79
Discharge: HOME | End: 2024-12-05
Payer: COMMERCIAL

## 2024-12-05 VITALS
HEIGHT: 68 IN | RESPIRATION RATE: 16 BRPM | WEIGHT: 245 LBS | OXYGEN SATURATION: 93 % | HEART RATE: 95 BPM | BODY MASS INDEX: 37.13 KG/M2 | DIASTOLIC BLOOD PRESSURE: 53 MMHG | SYSTOLIC BLOOD PRESSURE: 125 MMHG

## 2024-12-05 DIAGNOSIS — R18.8 PANCREATIC ASCITES: ICD-10-CM

## 2024-12-05 LAB
ALBUMIN FLD-MCNC: 0.7 G/DL
AMYLASE FLD-CCNC: 13 U/L
APPEARANCE FLD: NORMAL
BODY FLD TYPE: NORMAL
CLOT CHECK: NORMAL
COLOR FLD: YELLOW
ERYTHROCYTE [DISTWIDTH] IN BLOOD BY AUTOMATED COUNT: 18.6 % (ref 11.7–14.9)
GLUCOSE FLD-MCNC: 111 MG/DL
HCT VFR BLD AUTO: 25.6 % (ref 42–52)
HGB BLD-MCNC: 7.7 G/DL (ref 13.5–18)
INR PPP: 1.2
LDH FLD L TO P-CCNC: 39 U/L
MCH RBC QN AUTO: 25.5 PG (ref 27–31)
MCHC RBC AUTO-ENTMCNC: 30.1 G/DL (ref 32–36)
MCV RBC AUTO: 84.8 FL (ref 78–100)
MONOCYTES NFR FLD: 79 %
NEUTROPHILS NFR FLD: 21 %
PARTIAL THROMBOPLASTIN TIME: 37.4 SEC (ref 25.1–37.1)
PLATELET # BLD AUTO: 238 K/UL (ref 140–440)
PMV BLD AUTO: 9.9 FL (ref 7.5–11.1)
PROT FLD-MCNC: 1.3 G/DL
PROTHROMBIN TIME: 15.2 SEC (ref 11.7–14.5)
RBC # BLD AUTO: 3.02 M/UL (ref 4.6–6.2)
RBC # FLD: <2000 CELLS/UL
SPECIMEN TYPE: NORMAL
WBC # FLD: 101 CELLS/UL
WBC OTHER # BLD: 6.3 K/UL (ref 4–10.5)

## 2024-12-05 PROCEDURE — 83615 LACTATE (LD) (LDH) ENZYME: CPT

## 2024-12-05 PROCEDURE — 85027 COMPLETE CBC AUTOMATED: CPT

## 2024-12-05 PROCEDURE — 85610 PROTHROMBIN TIME: CPT

## 2024-12-05 PROCEDURE — P9047 ALBUMIN (HUMAN), 25%, 50ML: HCPCS

## 2024-12-05 PROCEDURE — 84157 ASSAY OF PROTEIN OTHER: CPT

## 2024-12-05 PROCEDURE — 82150 ASSAY OF AMYLASE: CPT

## 2024-12-05 PROCEDURE — P9047 ALBUMIN (HUMAN), 25%, 50ML: HCPCS | Performed by: STUDENT IN AN ORGANIZED HEALTH CARE EDUCATION/TRAINING PROGRAM

## 2024-12-05 PROCEDURE — 2709999900 IR US GUIDED PARACENTESIS

## 2024-12-05 PROCEDURE — 49083 ABD PARACENTESIS W/IMAGING: CPT

## 2024-12-05 PROCEDURE — 89051 BODY FLUID CELL COUNT: CPT

## 2024-12-05 PROCEDURE — 82945 GLUCOSE OTHER FLUID: CPT

## 2024-12-05 PROCEDURE — 6360000002 HC RX W HCPCS: Performed by: STUDENT IN AN ORGANIZED HEALTH CARE EDUCATION/TRAINING PROGRAM

## 2024-12-05 PROCEDURE — 85730 THROMBOPLASTIN TIME PARTIAL: CPT

## 2024-12-05 PROCEDURE — 6360000002 HC RX W HCPCS

## 2024-12-05 PROCEDURE — 82042 OTHER SOURCE ALBUMIN QUAN EA: CPT

## 2024-12-05 RX ORDER — LIDOCAINE HYDROCHLORIDE 10 MG/ML
INJECTION, SOLUTION EPIDURAL; INFILTRATION; INTRACAUDAL; PERINEURAL PRN
Status: COMPLETED | OUTPATIENT
Start: 2024-12-05 | End: 2024-12-05

## 2024-12-05 RX ORDER — ALBUMIN (HUMAN) 12.5 G/50ML
SOLUTION INTRAVENOUS CONTINUOUS PRN
Status: COMPLETED | OUTPATIENT
Start: 2024-12-05 | End: 2024-12-05

## 2024-12-05 RX ADMIN — LIDOCAINE HYDROCHLORIDE 7 ML: 10 INJECTION, SOLUTION EPIDURAL; INFILTRATION; INTRACAUDAL; PERINEURAL at 11:40

## 2024-12-05 RX ADMIN — ALBUMIN (HUMAN) 62.5 G: 0.25 INJECTION, SOLUTION INTRAVENOUS at 12:16

## 2024-12-05 ASSESSMENT — PAIN - FUNCTIONAL ASSESSMENT: PAIN_FUNCTIONAL_ASSESSMENT: NONE - DENIES PAIN

## 2024-12-05 NOTE — PROGRESS NOTES
TRANSFER - OUT REPORT:    Verbal report given to Neal on Boby Glasgow being transferred to IR holding for post procedure     Report consisted of patient's Situation, Background, Assessment and   Recommendations(SBAR).     Information from the following report(s) Nurse Handoff Report was reviewed with the receiving nurse.    Opportunity for questions and clarification was provided.      Patient transported with:   Registered Nurse

## 2024-12-05 NOTE — BRIEF OP NOTE
Department of Interventional Radiology Post-Procedure Note      Date: 12/5/2024     Interventional Radiologist: Clinton Razo.    Successful paracentesis.  9950cc removed.  Tolerated well.    Full Report to Follow.    Electronically signed by Clinton Razo MD on 12/5/2024 at 4:56 PM

## 2024-12-05 NOTE — H&P
Consult Interventional Radiology    Date:2024  Name:Boby Glasgow   :1945   MR#:[id number]    SEX:male     Planned procedure:  Paracentesis.  Indication: Ascites    H&P Status: H&P was reviewed, the patient was examined and no change has occurred in patient's condition since H&P was completed.    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  Initially treated with vancomycin but had a supratherapeutic level, hence, it was substituted by daptomycin.  Cumulative 6-week course of therapy will be completed on 3/13/2023.    Anxiety     Chronic heart failure, unspecified heart failure type (HCC) 2023    Chronic heart failure, unspecified heart failure type (HCC) 2023    Cirrhosis, alcoholic (HCC)     Diabetes mellitus (HCC)     GERD (gastroesophageal reflux disease)     GERD (gastroesophageal reflux disease)     Hyperlipidemia     Hypertension     Meniere's disease     Portal hypertension (HCC)     Pulmonary nodules     Secondary esophageal varices without bleeding (HCC)     Sleep apnea     SOB (shortness of breath)     Thyroid disease         Past Surgical History:  Past Surgical History:   Procedure Laterality Date    APPENDECTOMY      CHOLECYSTECTOMY      COLONOSCOPY      ENDOSCOPY, COLON, DIAGNOSTIC      FOOT SURGERY      needle removed,not sure which foot, per wife.    TONSILLECTOMY      UPPER GASTROINTESTINAL ENDOSCOPY N/A 2022    EGD BIOPSY performed by Travis Phelan MD at [facility] ENDOSCOPY    VASECTOMY         Social History:  Social History     Socioeconomic History    Marital status:      Spouse name: Not on file    Number of children: Not on file    Years of education: Not on file    Highest education level: Not on file   Occupational History     Comment: Retired    Tobacco Use    Smoking status: Former     Current packs/day: 0.00

## 2024-12-10 LAB — NON-GYN CYTOLOGY REPORT: NORMAL

## 2024-12-11 NOTE — PROGRESS NOTES
IR Procedure at [facility]:  Spoke with patient's wife Gaby and he will arrive at 1330 on 12/13/2024 for his procedure at 1430.Patient will take his medications as scheduled and has been given below instructions.      Follow your directions as prescribed by the doctor for your procedure and medications.  3.   Consult your provider as to when to stop blood thinner  4.   Do not take any pain medication within 6 hours of your procedure  5.   Do not drink any alcoholic beverages or use any street drugs 24 hours before procedure.  6.   Please wear simple, loose fitting clothing to the hospital.  Do not bring valuables (money,             credit cards, checkbooks, etc.)     7.   If you  have a Living Will and Durable Power of  for Healthcare, please bring in a copy.  8.   Please bring picture ID,  insurance card, paperwork from the doctors office            (H & P, Consent,  & card for implantable devices).  9.   Report to the information desk on the ground floor.  10. Take a shower the night before or morning of your procedure, do not apply any lotion, oil or powder.

## 2024-12-12 NOTE — PROGRESS NOTES
IR Procedure at [facility]:  Spoke with patient's wife Gaby and patient will arrive at 0800 at [facility] on 12/18/2024 for his procedure at 0900. Patient has been given below instructions and will take his medications as scheduled.        Follow your directions as prescribed by the doctor for your procedure and medications.  3.   Consult your provider as to when to stop blood thinner  4.   Do not take any pain medication within 6 hours of your procedure  5.   Do not drink any alcoholic beverages or use any street drugs 24 hours before procedure.  6.   Please wear simple, loose fitting clothing to the hospital.  Do not bring valuables (money,             credit cards, checkbooks, etc.)     7.   If you  have a Living Will and Durable Power of  for Healthcare, please bring in a copy.  8.   Please bring picture ID,  insurance card, paperwork from the doctors office            (H & P, Consent,  & card for implantable devices).  9.   Report to the information desk on the ground floor.  10. Take a shower the night before or morning of your procedure, do not apply any lotion, oil or powder.       You can access the FollowMyHealth Patient Portal offered by [facility] by registering at the following website: [email or web address]. By joining [employer]’s FollowMyHealth portal, you will also be able to view your health information using other applications (apps) compatible with our system.

## 2024-12-13 ENCOUNTER — HOSPITAL ENCOUNTER (OUTPATIENT)
Dept: INTERVENTIONAL RADIOLOGY/VASCULAR | Age: 79
Discharge: HOME | End: 2024-12-13
Payer: COMMERCIAL

## 2024-12-13 VITALS — DIASTOLIC BLOOD PRESSURE: 76 MMHG | SYSTOLIC BLOOD PRESSURE: 150 MMHG | HEART RATE: 100 BPM | OXYGEN SATURATION: 98 %

## 2024-12-13 DIAGNOSIS — R18.8 PANCREATIC ASCITES: ICD-10-CM

## 2024-12-13 LAB
ALBUMIN FLD-MCNC: 0.6 G/DL
AMYLASE FLD-CCNC: 12 U/L
APPEARANCE FLD: NORMAL
BODY FLD TYPE: NORMAL
CLOT CHECK: NORMAL
COLOR FLD: YELLOW
GLUCOSE FLD-MCNC: 108 MG/DL
LDH FLD L TO P-CCNC: 42 U/L
MONOCYTES NFR FLD: 86 %
NEUTROPHILS NFR FLD: 14 %
PROT FLD-MCNC: 1.2 G/DL
RBC # FLD: <2000 CELLS/UL
SPECIMEN TYPE: NORMAL
WBC # FLD: 123 CELLS/UL

## 2024-12-13 PROCEDURE — 2709999900 IR US GUIDED PARACENTESIS

## 2024-12-13 PROCEDURE — 6360000002 HC RX W HCPCS: Performed by: RADIOLOGY

## 2024-12-13 PROCEDURE — 87070 CULTURE OTHR SPECIMN AEROBIC: CPT

## 2024-12-13 PROCEDURE — 6360000002 HC RX W HCPCS: Performed by: SPECIALIST

## 2024-12-13 PROCEDURE — 89051 BODY FLUID CELL COUNT: CPT

## 2024-12-13 PROCEDURE — P9047 ALBUMIN (HUMAN), 25%, 50ML: HCPCS

## 2024-12-13 PROCEDURE — 84157 ASSAY OF PROTEIN OTHER: CPT

## 2024-12-13 PROCEDURE — 82042 OTHER SOURCE ALBUMIN QUAN EA: CPT

## 2024-12-13 PROCEDURE — 49083 ABD PARACENTESIS W/IMAGING: CPT

## 2024-12-13 PROCEDURE — 87075 CULTR BACTERIA EXCEPT BLOOD: CPT

## 2024-12-13 PROCEDURE — 82945 GLUCOSE OTHER FLUID: CPT

## 2024-12-13 PROCEDURE — 87205 SMEAR GRAM STAIN: CPT

## 2024-12-13 PROCEDURE — 83615 LACTATE (LD) (LDH) ENZYME: CPT

## 2024-12-13 PROCEDURE — 82150 ASSAY OF AMYLASE: CPT

## 2024-12-13 PROCEDURE — 6360000002 HC RX W HCPCS

## 2024-12-13 PROCEDURE — P9047 ALBUMIN (HUMAN), 25%, 50ML: HCPCS | Performed by: SPECIALIST

## 2024-12-13 RX ORDER — ALBUMIN (HUMAN) 12.5 G/50ML
SOLUTION INTRAVENOUS CONTINUOUS PRN
Status: COMPLETED | OUTPATIENT
Start: 2024-12-13 | End: 2024-12-13

## 2024-12-13 RX ORDER — LIDOCAINE HYDROCHLORIDE 10 MG/ML
INJECTION, SOLUTION EPIDURAL; INFILTRATION; INTRACAUDAL; PERINEURAL PRN
Status: COMPLETED | OUTPATIENT
Start: 2024-12-13 | End: 2024-12-13

## 2024-12-13 RX ADMIN — ALBUMIN (HUMAN) 62.5 G: 0.25 INJECTION, SOLUTION INTRAVENOUS at 15:10

## 2024-12-13 RX ADMIN — LIDOCAINE HYDROCHLORIDE 10 ML: 10 INJECTION, SOLUTION EPIDURAL; INFILTRATION; INTRACAUDAL; PERINEURAL at 14:19

## 2024-12-13 NOTE — BRIEF OP NOTE
Department of Interventional Radiology Post-Procedure Note      Date: 12/13/2024     Interventional Radiologist: Constantine    Procedure Performed:  paracentesis    H&P Status: H&P was reviewed, the patient was examined and no change has occurred in patient's condition since H&P was completed.    Pre-procedure Diagnosis:  ascites    Post-procedure Diagnosis:  Same    Sedation:  none    Contrast used:  none    Estimated blood loss:  Minimal    Preliminary Findings:  Successful    Complications:  none    Full Report to Follow.    Electronically signed by Jennifer Fitch MD on 12/13/2024 at 3:26 PM

## 2024-12-13 NOTE — OR NURSING
Pt had paracentesis performed by Dr Fitch with 1,000 cc cloudy fluid sent to lab and a total of 10,890 cc of fluid removed.    Pt has 4x4 and tegaderm applied to site with no bleeding noted

## 2024-12-13 NOTE — CONSULTS
Hands drawn involuntarily       Medications:  Current Outpatient Medications on File Prior to Encounter   Medication Sig Dispense Refill    DULoxetine (CYMBALTA) 60 MG extended release capsule Take 1 capsule by mouth once daily 90 capsule 0    lactulose (CONSTULOSE) 10 GM/15ML solution TAKE 30ML BY MOUTH THREE TIMES DAILY (HOLD IF HAVING DIARRHEA) 270 mL 11    furosemide (LASIX) 40 MG tablet Take 1 tablet by mouth once daily 60 tablet 0    rifAXIMin (XIFAXAN) 550 MG tablet Take 1 tablet by mouth 2 times daily 180 tablet 3    levothyroxine (SYNTHROID) 50 MCG tablet Take 1 tablet by mouth once daily 90 tablet 1    spironolactone (ALDACTONE) 100 MG tablet Take 1 tablet by mouth twice daily 60 tablet 0    albuterol sulfate HFA (PROVENTIL;VENTOLIN;PROAIR) 108 (90 Base) MCG/ACT inhaler INHALE 2 PUFFS BY MOUTH 4 TIMES DAILY AS NEEDED FOR WHEEZING 1 each 2    Multiple Vitamin (MULTI VITAMIN MENS PO) Take 1 tablet by mouth daily      Omega-3 1000 MG CAPS Take 2 capsules by mouth daily       No current facility-administered medications on file prior to encounter.       Review of Systems:  A 10 point review of systems was conducted and was negative with the exception of what is noted above.     Vital Signs:  Vitals:    12/13/24 1431 12/13/24 1445 12/13/24 1504 12/13/24 1514   BP: (!) 155/75 (!) 163/77 (!) 172/75 (!) 153/73   Pulse: (!) 105 100 (!) 101 100   SpO2: 96% 97% 98% 98%        Laboratory:  No results for input(s): \"WBC\", \"NA\", \"POTASSIUM\", \"CL\", \"CO2\", \"BUN\", \"CREATININE\", \"GLUCOSE\", \"INR\", \"CKMB\" in the last 72 hours.    Invalid input(s): \"HEMOGLOBIN\", \"HEMATOCRIT\", \"PLATELETS\", \"CA\", \"PT\", \"PTT\", \"CK1\", \"TROP\"      Mallampati Score III  ASA class III    Medications reviewed: No contraindications for coagulation or sedation plans    IMAGING DATA   None available for review    ASSESSMENT AND PLAN     Pt is a good candidate for paracentesis      Procedure, RBA explained to patient and available family. Informed consent

## 2024-12-13 NOTE — PROGRESS NOTES
TRANSFER - OUT REPORT:    Verbal report given to Kanwal LAGUNAS on Boby Glasgow being transferred to  for routine progression of patient care       Report consisted of patient's Situation, Background, Assessment and   Recommendations(SBAR).     Information from the following report(s) Nurse Handoff Report was reviewed with the receiving nurse.    Opportunity for questions and clarification was provided.      Patient transported with:   Registered Nurse

## 2024-12-16 DIAGNOSIS — K74.60 CIRRHOSIS OF LIVER WITH ASCITES, UNSPECIFIED HEPATIC CIRRHOSIS TYPE (HCC): Primary | ICD-10-CM

## 2024-12-16 DIAGNOSIS — R18.8 CIRRHOSIS OF LIVER WITH ASCITES, UNSPECIFIED HEPATIC CIRRHOSIS TYPE (HCC): Primary | ICD-10-CM

## 2024-12-17 LAB — NON-GYN CYTOLOGY REPORT: NORMAL

## 2024-12-17 NOTE — PROGRESS NOTES
IR Procedure at [facility]:  12/26/24 @ 1000, arrival 0900    You may eat a light breakfast      Follow your directions as prescribed by the doctor for your procedure and medications.  3.   Consult your provider as to when to stop blood thinner  4.   Do not take any pain medication within 6 hours of your procedure  5.   Do not drink any alcoholic beverages or use any street drugs 24 hours before procedure.  6.   Please wear simple, loose fitting clothing to the hospital.  Do not bring valuables (money,             credit cards, checkbooks, etc.)     7.   If you  have a Living Will and Durable Power of  for Healthcare, please bring in a copy.  8.   Please bring picture ID,  insurance card, paperwork from the doctors office            (H & P, Consent,  & card for implantable devices).  9.   Report to the information desk on the ground floor.  10. Take a shower the night before or morning of your procedure, do not apply any lotion, oil or powder.

## 2024-12-18 ENCOUNTER — HOSPITAL ENCOUNTER (OUTPATIENT)
Dept: INTERVENTIONAL RADIOLOGY/VASCULAR | Age: 79
Discharge: HOME | End: 2024-12-18
Payer: COMMERCIAL

## 2024-12-18 VITALS
DIASTOLIC BLOOD PRESSURE: 83 MMHG | RESPIRATION RATE: 16 BRPM | OXYGEN SATURATION: 94 % | SYSTOLIC BLOOD PRESSURE: 166 MMHG | HEART RATE: 80 BPM

## 2024-12-18 DIAGNOSIS — R18.8 PANCREATIC ASCITES: ICD-10-CM

## 2024-12-18 DIAGNOSIS — R18.8 CIRRHOSIS OF LIVER WITH ASCITES, UNSPECIFIED HEPATIC CIRRHOSIS TYPE (HCC): ICD-10-CM

## 2024-12-18 DIAGNOSIS — K74.60 CIRRHOSIS OF LIVER WITH ASCITES, UNSPECIFIED HEPATIC CIRRHOSIS TYPE (HCC): ICD-10-CM

## 2024-12-18 LAB
APPEARANCE FLD: NORMAL
BODY FLD TYPE: NORMAL
CLOT CHECK: NORMAL
COLOR FLD: YELLOW
ERYTHROCYTE [DISTWIDTH] IN BLOOD BY AUTOMATED COUNT: 17.7 % (ref 11.7–14.9)
HCT VFR BLD AUTO: 25.2 % (ref 42–52)
HGB BLD-MCNC: 7.5 G/DL (ref 13.5–18)
INR PPP: 1.2
MCH RBC QN AUTO: 25.1 PG (ref 27–31)
MCHC RBC AUTO-ENTMCNC: 29.8 G/DL (ref 32–36)
MCV RBC AUTO: 84.3 FL (ref 78–100)
MONOCYTES NFR FLD: 85 %
NEUTROPHILS NFR FLD: 15 %
PLATELET # BLD AUTO: 248 K/UL (ref 140–440)
PMV BLD AUTO: 9.9 FL (ref 7.5–11.1)
PROTHROMBIN TIME: 15.3 SEC (ref 11.7–14.5)
RBC # BLD AUTO: 2.99 M/UL (ref 4.6–6.2)
RBC # FLD: NORMAL CELLS/UL
WBC # FLD: 201 CELLS/UL
WBC OTHER # BLD: 5.9 K/UL (ref 4–10.5)

## 2024-12-18 PROCEDURE — 6360000002 HC RX W HCPCS

## 2024-12-18 PROCEDURE — 49083 ABD PARACENTESIS W/IMAGING: CPT

## 2024-12-18 PROCEDURE — P9047 ALBUMIN (HUMAN), 25%, 50ML: HCPCS | Performed by: INTERNAL MEDICINE

## 2024-12-18 PROCEDURE — P9047 ALBUMIN (HUMAN), 25%, 50ML: HCPCS

## 2024-12-18 PROCEDURE — 87205 SMEAR GRAM STAIN: CPT

## 2024-12-18 PROCEDURE — 87070 CULTURE OTHR SPECIMN AEROBIC: CPT

## 2024-12-18 PROCEDURE — 85610 PROTHROMBIN TIME: CPT

## 2024-12-18 PROCEDURE — 85027 COMPLETE CBC AUTOMATED: CPT

## 2024-12-18 PROCEDURE — 89051 BODY FLUID CELL COUNT: CPT

## 2024-12-18 PROCEDURE — 87075 CULTR BACTERIA EXCEPT BLOOD: CPT

## 2024-12-18 PROCEDURE — 6360000002 HC RX W HCPCS: Performed by: INTERNAL MEDICINE

## 2024-12-18 PROCEDURE — 2709999900 IR US GUIDED PARACENTESIS

## 2024-12-18 RX ORDER — ALBUMIN (HUMAN) 12.5 G/50ML
SOLUTION INTRAVENOUS CONTINUOUS PRN
Status: COMPLETED | OUTPATIENT
Start: 2024-12-18 | End: 2024-12-18

## 2024-12-18 RX ORDER — LIDOCAINE HYDROCHLORIDE 10 MG/ML
INJECTION, SOLUTION EPIDURAL; INFILTRATION; INTRACAUDAL; PERINEURAL PRN
Status: COMPLETED | OUTPATIENT
Start: 2024-12-18 | End: 2024-12-18

## 2024-12-18 RX ADMIN — ALBUMIN (HUMAN) 37.5 G: 0.25 INJECTION, SOLUTION INTRAVENOUS at 10:27

## 2024-12-18 RX ADMIN — LIDOCAINE HYDROCHLORIDE 15 ML: 10 INJECTION, SOLUTION EPIDURAL; INFILTRATION; INTRACAUDAL; PERINEURAL at 10:14

## 2024-12-18 NOTE — PROCEDURES
PROCEDURE NOTE  Date: 12/18/2024   Name: Boby Glasgow  YOB: 1945    Paracentesis    Date/Time: 12/18/2024 10:50 AM    Performed by: Hernesto Mera MD  Authorized by: Hernesto Mera MD  Consent: Verbal consent obtained. Written consent obtained.  Risks and benefits: risks, benefits and alternatives were discussed  Consent given by: patient  Patient understanding: patient states understanding of the procedure being performed  Patient consent: the patient's understanding of the procedure matches consent given  Procedure consent: procedure consent matches procedure scheduled  Relevant documents: relevant documents present and verified  Test results: test results available and properly labeled  Site marked: the operative site was marked  Imaging studies: imaging studies available  Patient identity confirmed: verbally with patient and arm band  Time out: Immediately prior to procedure a \"time out\" was called to verify the correct patient, procedure, equipment, support staff and site/side marked as required.  Initial or subsequent exam: subsequent  Procedure purpose: therapeutic  Indications: abdominal discomfort secondary to ascites  Anesthesia: local infiltration    Anesthesia:  Local Anesthetic: lidocaine 1% without epinephrine  Anesthetic total: 10 mL    Sedation:  Patient sedated: no    Preparation: Patient was prepped and draped in the usual sterile fashion.  Needle gauge: 18  Ultrasound guidance: yes  Puncture site: right lower quadrant  Fluid removed: 6700(ml)  Fluid appearance: cloudy  Dressing: 4x4 sterile gauze  Patient tolerance: patient tolerated the procedure well with no immediate complications

## 2024-12-18 NOTE — PROGRESS NOTES
TRANSFER - OUT REPORT:    Verbal report given to Kanwal/JANNETH Retana on Boby Glasgow being transferred to IR holding for routine post-op       Report consisted of patient's Situation, Background, Assessment and   Recommendations(SBAR).     Information from the following report(s) Nurse Handoff Report was reviewed with the receiving nurse.    Opportunity for questions and clarification was provided.      Patient transported with:   Registered Nurse

## 2024-12-19 NOTE — PROGRESS NOTES
IR Procedure at [facility]:  Spoke with patient's wife Gaby and patient will arrive at 12 on 12/26/2024 for his procedure at 1300 and he will arrive at 0900 on 1/2/2025 for his procedure at 1000. Patient has been given below instructions and he will take his medications as scheduled.      Follow your directions as prescribed by the doctor for your procedure and medications.  3.   Consult your provider as to when to stop blood thinner  4.   Do not take any pain medication within 6 hours of your procedure  5.   Do not drink any alcoholic beverages or use any street drugs 24 hours before procedure.  6.   Please wear simple, loose fitting clothing to the hospital.  Do not bring valuables (money,             credit cards, checkbooks, etc.)     7.   If you  have a Living Will and Durable Power of  for Healthcare, please bring in a copy.  8.   Please bring picture ID,  insurance card, paperwork from the doctors office            (H & P, Consent,  & card for implantable devices).  9.   Report to the information desk on the ground floor.  10. Take a shower the night before or morning of your procedure, do not apply any lotion, oil or powder.  11. You will need a responsible adult

## 2024-12-23 RX ORDER — FUROSEMIDE 40 MG/1
40 TABLET ORAL DAILY
Qty: 60 TABLET | Refills: 0 | Status: SHIPPED | OUTPATIENT
Start: 2024-12-23

## 2024-12-24 NOTE — PROGRESS NOTES
IR Procedure at [facility]:  Left patient's wife Gaby a message for patient to arrive at 1200 at [facility] on 12/26/2024 for his procedure at 1300. Patient's time was changed.ADDENDUM: Spoke with patient's wife Gaby to let her know patient's procedure is now at 1300 and his arrival time is 1200.    NPO at Midnight      Follow your directions as prescribed by the doctor for your procedure and medications.  3.   Consult your provider as to when to stop blood thinner  4.   Do not take any pain medication within 6 hours of your procedure  5.   Do not drink any alcoholic beverages or use any street drugs 24 hours before procedure.  6.   Please wear simple, loose fitting clothing to the hospital.  Do not bring valuables (money,             credit cards, checkbooks, etc.)     7.   If you  have a Living Will and Durable Power of  for Healthcare, please bring in a copy.  8.   Please bring picture ID,  insurance card, paperwork from the doctors office            (H & P, Consent,  & card for implantable devices).  9.   Report to the information desk on the ground floor.  10. Take a shower the night before or morning of your procedure, do not apply any lotion, oil or powder.  11. You will need a responsible adult

## 2024-12-26 ENCOUNTER — HOSPITAL ENCOUNTER (OUTPATIENT)
Dept: INTERVENTIONAL RADIOLOGY/VASCULAR | Age: 79
Discharge: HOME | End: 2024-12-26
Payer: COMMERCIAL

## 2024-12-26 VITALS
WEIGHT: 250 LBS | OXYGEN SATURATION: 99 % | SYSTOLIC BLOOD PRESSURE: 152 MMHG | BODY MASS INDEX: 37.89 KG/M2 | HEIGHT: 68 IN | HEART RATE: 80 BPM | DIASTOLIC BLOOD PRESSURE: 76 MMHG

## 2024-12-26 DIAGNOSIS — R18.8 OTHER ASCITES: ICD-10-CM

## 2024-12-26 LAB
ALBUMIN FLD-MCNC: 0.7 G/DL
AMYLASE FLD-CCNC: 14 U/L
APPEARANCE FLD: NORMAL
BLASTS NFR FLD: 0 %
BODY FLD TYPE: NORMAL
CLOT CHECK: NORMAL
COLOR FLD: YELLOW
EOSINOPHIL NFR FLD: 0 %
GLUCOSE FLD-MCNC: 120 MG/DL
LDH FLD L TO P-CCNC: 43 U/L
LYMPHOCYTES NFR FLD: 47 %
MESOTHELIAL CELLS BODY FLUID: 13 %
MONOCYTES NFR FLD: 46 %
NEUTROPHILS NFR FLD: 7 %
PROT FLD-MCNC: 1.2 G/DL
RBC # FLD: <2000 CELLS/UL
SPECIMEN TYPE: NORMAL
UNIDENT CELLS NFR FLD: 0 %
WBC # FLD: 118 CELLS/UL

## 2024-12-26 PROCEDURE — 83615 LACTATE (LD) (LDH) ENZYME: CPT

## 2024-12-26 PROCEDURE — 6360000002 HC RX W HCPCS

## 2024-12-26 PROCEDURE — P9047 ALBUMIN (HUMAN), 25%, 50ML: HCPCS

## 2024-12-26 PROCEDURE — 2709999900 IR US GUIDED PARACENTESIS

## 2024-12-26 PROCEDURE — 82150 ASSAY OF AMYLASE: CPT

## 2024-12-26 PROCEDURE — 49083 ABD PARACENTESIS W/IMAGING: CPT

## 2024-12-26 PROCEDURE — 89051 BODY FLUID CELL COUNT: CPT

## 2024-12-26 PROCEDURE — 87070 CULTURE OTHR SPECIMN AEROBIC: CPT

## 2024-12-26 PROCEDURE — 6360000002 HC RX W HCPCS: Performed by: STUDENT IN AN ORGANIZED HEALTH CARE EDUCATION/TRAINING PROGRAM

## 2024-12-26 PROCEDURE — 87075 CULTR BACTERIA EXCEPT BLOOD: CPT

## 2024-12-26 PROCEDURE — 87205 SMEAR GRAM STAIN: CPT

## 2024-12-26 PROCEDURE — 82042 OTHER SOURCE ALBUMIN QUAN EA: CPT

## 2024-12-26 PROCEDURE — 82945 GLUCOSE OTHER FLUID: CPT

## 2024-12-26 PROCEDURE — 84157 ASSAY OF PROTEIN OTHER: CPT

## 2024-12-26 RX ORDER — LIDOCAINE HYDROCHLORIDE 10 MG/ML
INJECTION, SOLUTION EPIDURAL; INFILTRATION; INTRACAUDAL; PERINEURAL PRN
Status: COMPLETED | OUTPATIENT
Start: 2024-12-26 | End: 2024-12-26

## 2024-12-26 RX ADMIN — LIDOCAINE HYDROCHLORIDE 10 ML: 10 INJECTION, SOLUTION EPIDURAL; INFILTRATION; INTRACAUDAL; PERINEURAL at 13:08

## 2024-12-26 ASSESSMENT — PAIN - FUNCTIONAL ASSESSMENT: PAIN_FUNCTIONAL_ASSESSMENT: NONE - DENIES PAIN

## 2024-12-26 NOTE — PROGRESS NOTES
TRANSFER - OUT REPORT:    Verbal report given to JANNETH Gerardo  on Boby Glasgow being transferred to IR [facility] [address] for routine post-op.  Paracentesis performed by Dr. Gaona.  Vitals WNL.  8150 cc clear yellow ascitic fluid removed.  Albumin administered.  Dressing WNL/       Report consisted of patient's Situation, Background, Assessment and   Recommendations(SBAR).     Information from the following report(s) Nurse Handoff Report was reviewed with the receiving nurse.    Opportunity for questions and clarification was provided.      Patient transported with:   Registered Nurse

## 2024-12-26 NOTE — PROCEDURES
PROCEDURE NOTE  Date: 12/26/2024   Name: Boby Glasgow  YOB: 1945    Paracentesis Procedure Note   Procedure: Paracentesis   Procedure Clinician: Charles Gaona MD   Procedure Assistant: None   Indications: Ascites   Size and location: 20 gauge needle in Right lower quadrant   Attempts: 1   Procedure Details:   Informed consent was obtained for the procedure, including sedation. Risks of bowel perforation, hemorrhage, and adverse drug reaction were discussed.   A time-out was completed verifying correct patient, procedure, site, and positioning. The appropriate area was confirmed and marked in the Right lower quadrant. The patient was positioned in the supine position, and under sterile conditions the skin was prepped with chlorhexidine and covered with a sterile drape. Local anesthesia was applied to the skin and subcutaneous tissues. Ultrasound was used to localize the ascitic fluid and a 2mm incision was made with an 11 gauge scalpel. An 18-gauge needle was introduced into the abdomen in the usual fashion via the Z-track method. Once ascitic fluid was obtained, the catheter was carefully guided over the needle. A catheter was then inserted over the needle and the needle was then removed. Fluid was removed by attaching the catheter to a vacuum bottle and a total of 8150 mL of ascitic fluid was drain. The catheter was then withdrawn with the tip intact.   Findings:   There were no changes to vital signs. Patient tolerated the procedure well. Blood loss was minimal. Fluid samples were sent to the laboratory for evaluation.

## 2024-12-26 NOTE — PROGRESS NOTES
PT to post op holding following paracentesis, pt alert and oriented, denies pain, VSS, dressing clean dry and intact.

## 2024-12-31 LAB — NON-GYN CYTOLOGY REPORT: NORMAL

## 2025-01-02 ENCOUNTER — HOSPITAL ENCOUNTER (OUTPATIENT)
Dept: INTERVENTIONAL RADIOLOGY/VASCULAR | Age: 80
Discharge: HOME | End: 2025-01-02
Payer: COMMERCIAL

## 2025-01-02 VITALS
SYSTOLIC BLOOD PRESSURE: 165 MMHG | RESPIRATION RATE: 16 BRPM | WEIGHT: 250 LBS | DIASTOLIC BLOOD PRESSURE: 67 MMHG | BODY MASS INDEX: 37.89 KG/M2 | HEIGHT: 68 IN | HEART RATE: 98 BPM | OXYGEN SATURATION: 96 %

## 2025-01-02 DIAGNOSIS — K70.31 ALCOHOLIC CIRRHOSIS OF LIVER WITH ASCITES (HCC): ICD-10-CM

## 2025-01-02 LAB
ALBUMIN FLD-MCNC: 0.6 G/DL
APPEARANCE FLD: NORMAL
BODY FLD TYPE: NORMAL
CLOT CHECK: NORMAL
COLOR FLD: YELLOW
MONOCYTES NFR FLD: 86 %
NEUTROPHILS NFR FLD: 14 %
RBC # FLD: <2000 CELLS/UL
SPECIMEN TYPE: NORMAL
WBC # FLD: 115 CELLS/UL

## 2025-01-02 PROCEDURE — P9047 ALBUMIN (HUMAN), 25%, 50ML: HCPCS | Performed by: STUDENT IN AN ORGANIZED HEALTH CARE EDUCATION/TRAINING PROGRAM

## 2025-01-02 PROCEDURE — 2709999900 IR US GUIDED PARACENTESIS

## 2025-01-02 PROCEDURE — 7100000010 HC PHASE II RECOVERY - FIRST 15 MIN

## 2025-01-02 PROCEDURE — 49083 ABD PARACENTESIS W/IMAGING: CPT

## 2025-01-02 PROCEDURE — 6360000002 HC RX W HCPCS

## 2025-01-02 PROCEDURE — 87070 CULTURE OTHR SPECIMN AEROBIC: CPT

## 2025-01-02 PROCEDURE — 89051 BODY FLUID CELL COUNT: CPT

## 2025-01-02 PROCEDURE — 87075 CULTR BACTERIA EXCEPT BLOOD: CPT

## 2025-01-02 PROCEDURE — 87205 SMEAR GRAM STAIN: CPT

## 2025-01-02 PROCEDURE — 6360000002 HC RX W HCPCS: Performed by: STUDENT IN AN ORGANIZED HEALTH CARE EDUCATION/TRAINING PROGRAM

## 2025-01-02 PROCEDURE — P9047 ALBUMIN (HUMAN), 25%, 50ML: HCPCS

## 2025-01-02 PROCEDURE — 82042 OTHER SOURCE ALBUMIN QUAN EA: CPT

## 2025-01-02 RX ORDER — LIDOCAINE HYDROCHLORIDE 10 MG/ML
INJECTION, SOLUTION EPIDURAL; INFILTRATION; INTRACAUDAL; PERINEURAL PRN
Status: COMPLETED | OUTPATIENT
Start: 2025-01-02 | End: 2025-01-02

## 2025-01-02 RX ORDER — ALBUMIN (HUMAN) 12.5 G/50ML
SOLUTION INTRAVENOUS CONTINUOUS PRN
Status: COMPLETED | OUTPATIENT
Start: 2025-01-02 | End: 2025-01-02

## 2025-01-02 RX ADMIN — ALBUMIN (HUMAN) 50 G: 0.25 INJECTION, SOLUTION INTRAVENOUS at 10:24

## 2025-01-02 RX ADMIN — LIDOCAINE HYDROCHLORIDE 11 ML: 10 INJECTION, SOLUTION EPIDURAL; INFILTRATION; INTRACAUDAL; PERINEURAL at 09:39

## 2025-01-02 NOTE — PROGRESS NOTES
TRANSFER - OUT REPORT:    Verbal report given to Humaira LAGUNAS on Boby Glasgow being transferred to IR holding for routine post-op       Report consisted of patient's Situation, Background, Assessment and   Recommendations(SBAR).     Information from the following report(s) Nurse Handoff Report was reviewed with the receiving nurse.    Opportunity for questions and clarification was provided.      Patient transported with:   Registered Nurse    Successful paracentesis. 6850cc of cloudy white fluid was drained. 1000cc was sent to the lab. 50g of albumin was given

## 2025-01-02 NOTE — PROCEDURES
PROCEDURE NOTE  Date: 1/2/2025   Name: Boby Glasgow  YOB: 1945    Paracentesis Procedure Note   Procedure: Paracentesis   Procedure Clinician: Charles Gaona MD   Procedure Assistant: None   Indications: Ascites   Size and location: 20 gauge needle in right   Attempts: 1   Procedure Details:   Informed consent was obtained for the procedure, including sedation. Risks of bowel perforation, hemorrhage, and adverse drug reaction were discussed.   A time-out was completed verifying correct patient, procedure, site, and positioning. The appropriate area was confirmed and marked in the Right quadrant. The patient was positioned in the supine position, and under sterile conditions the skin was prepped with chlorhexidine and covered with a sterile drape. Local anesthesia was applied to the skin and subcutaneous tissues. Ultrasound was used to localize the ascitic fluid and a 2mm incision was made with an 11 gauge scalpel. An 18-gauge needle was introduced into the abdomen in the usual fashion via the Z-track method. Once ascitic fluid was obtained, the catheter was carefully guided over the needle. A catheter was then inserted over the needle and the needle was then removed. Fluid was removed by attaching the catheter to a vacuum bottle and a total of 6.85L of ascitic fluid was drain. The catheter was then withdrawn with the tip intact.   Findings:   There were no changes to vital signs. Patient tolerated the procedure well. Blood loss was minimal. Fluid samples were sent to the laboratory for evaluation.

## 2025-01-08 ENCOUNTER — TELEPHONE (OUTPATIENT)
Dept: GASTROENTEROLOGY | Age: 80
End: 2025-01-08

## 2025-01-08 NOTE — PROGRESS NOTES
IR Procedure at [facility]:  Left message on patient's wife Gaby's phone for patient to arrive at 1300 at [facility] on 1/9/2025 for his procedure at 1400. Patient has been given below instructions and will take his medications as scheduled.    Follow your directions as prescribed by the doctor for your procedure and medications.  3.   Consult your provider as to when to stop blood thinner  4.   Do not take any pain medication within 6 hours of your procedure  5.   Do not drink any alcoholic beverages or use any street drugs 24 hours before procedure.  6.   Please wear simple, loose fitting clothing to the hospital.  Do not bring valuables (money,             credit cards, checkbooks, etc.)     7.   If you  have a Living Will and Durable Power of  for Healthcare, please bring in a copy.  8.   Please bring picture ID,  insurance card, paperwork from the doctors office            (H & P, Consent,  & card for implantable devices).  9.   Report to the information desk on the ground floor.  10. Take a shower the night before or morning of your procedure, do not apply any lotion, oil or powder.  11. You will need a responsible adult

## 2025-01-08 NOTE — PROGRESS NOTES
IR Procedure at [facility]:  Left a message on patient's wife Gaby's phone for patient to arrive at 1200 at [facility] on 1/16/2025 for his procedure at 1300. Patient has been given below instructions and will take his medications as scheduled.       Follow your directions as prescribed by the doctor for your procedure and medications.  3.   Consult your provider as to when to stop blood thinner  4.   Do not take any pain medication within 6 hours of your procedure  5.   Do not drink any alcoholic beverages or use any street drugs 24 hours before procedure.  6.   Please wear simple, loose fitting clothing to the hospital.  Do not bring valuables (money,             credit cards, checkbooks, etc.)     7.   If you  have a Living Will and Durable Power of  for Healthcare, please bring in a copy.  8.   Please bring picture ID,  insurance card, paperwork from the doctors office            (H & P, Consent,  & card for implantable devices).  9.   Report to the information desk on the ground floor.  10. Take a shower the night before or morning of your procedure, do not apply any lotion, oil or powder.  11. You will need a responsible adult

## 2025-01-09 ENCOUNTER — HOSPITAL ENCOUNTER (OUTPATIENT)
Dept: INTERVENTIONAL RADIOLOGY/VASCULAR | Age: 80
Discharge: HOME | End: 2025-01-09
Payer: COMMERCIAL

## 2025-01-09 VITALS
HEART RATE: 93 BPM | RESPIRATION RATE: 13 BRPM | OXYGEN SATURATION: 95 % | DIASTOLIC BLOOD PRESSURE: 79 MMHG | SYSTOLIC BLOOD PRESSURE: 150 MMHG

## 2025-01-09 DIAGNOSIS — R18.8 CIRRHOSIS OF LIVER WITH ASCITES, UNSPECIFIED HEPATIC CIRRHOSIS TYPE (HCC): ICD-10-CM

## 2025-01-09 DIAGNOSIS — K74.60 CIRRHOSIS OF LIVER WITH ASCITES, UNSPECIFIED HEPATIC CIRRHOSIS TYPE (HCC): ICD-10-CM

## 2025-01-09 LAB
ALBUMIN FLD-MCNC: 0.6 G/DL
AMYLASE FLD-CCNC: 14 U/L
APPEARANCE FLD: NORMAL
BLASTS NFR FLD: 0 %
BODY FLD TYPE: NORMAL
CLOT CHECK: NORMAL
COLOR FLD: YELLOW
EOSINOPHIL NFR FLD: 0 %
GLUCOSE FLD-MCNC: 119 MG/DL
LDH FLD L TO P-CCNC: 39 U/L
LYMPHOCYTES NFR FLD: 24 %
MONOCYTES NFR FLD: 66 %
NEUTROPHILS NFR FLD: 10 %
PROT FLD-MCNC: 1.3 G/DL
RBC # FLD: <2000 CELLS/UL
SPECIMEN TYPE: NORMAL
UNIDENT CELLS NFR FLD: 0 %
WBC # FLD: 95 CELLS/UL

## 2025-01-09 PROCEDURE — 2709999900 IR US GUIDED PARACENTESIS

## 2025-01-09 PROCEDURE — 82945 GLUCOSE OTHER FLUID: CPT

## 2025-01-09 PROCEDURE — 82150 ASSAY OF AMYLASE: CPT

## 2025-01-09 PROCEDURE — 89051 BODY FLUID CELL COUNT: CPT

## 2025-01-09 PROCEDURE — 49083 ABD PARACENTESIS W/IMAGING: CPT

## 2025-01-09 PROCEDURE — 6360000002 HC RX W HCPCS

## 2025-01-09 PROCEDURE — 87070 CULTURE OTHR SPECIMN AEROBIC: CPT

## 2025-01-09 PROCEDURE — 87205 SMEAR GRAM STAIN: CPT

## 2025-01-09 PROCEDURE — 82042 OTHER SOURCE ALBUMIN QUAN EA: CPT

## 2025-01-09 PROCEDURE — 83615 LACTATE (LD) (LDH) ENZYME: CPT

## 2025-01-09 PROCEDURE — P9047 ALBUMIN (HUMAN), 25%, 50ML: HCPCS

## 2025-01-09 PROCEDURE — 84157 ASSAY OF PROTEIN OTHER: CPT

## 2025-01-09 PROCEDURE — 87075 CULTR BACTERIA EXCEPT BLOOD: CPT

## 2025-01-09 NOTE — PROGRESS NOTES
TRANSFER - OUT REPORT:  Successful Paracentesis in IR with Dr. Fitch at this time. Pt. tolerated well. Vitals stable. 10,810cc milky, yellow fluid from right lower ABD. Fluid sent to lab for testing as ordered. Dressing applied. Returned to holding to prepare for discharge.    Verbal report given to Sj RN on Boby Glasgow being transferred back to IR holding for routine post-op       Report consisted of patient's Situation, Background, Assessment and   Recommendations(SBAR).     Information from the following report(s) Nurse Handoff Report was reviewed with the receiving nurse.    Opportunity for questions and clarification was provided.      Patient transported with:   Registered Nurse

## 2025-01-09 NOTE — BRIEF OP NOTE
Department of Interventional Radiology Post-Procedure Note      Date: 1/9/2025     Interventional Radiologist: Constantine    Procedure Performed:  paracentesis    H&P Status: H&P was reviewed, the patient was examined and no change has occurred in patient's condition since H&P was completed.    Pre-procedure Diagnosis:  Ascites    Post-procedure Diagnosis:  Same    Sedation:  none    Contrast used:  none    Estimated blood loss:  Minimal    Preliminary Findings:  Successful    Complications:  none    Full Report to Follow.    Electronically signed by Jennifer Fitch MD on 1/9/2025 at 2:39 PM

## 2025-01-10 NOTE — CONSULTS
Consult Interventional Radiology    Date:2025  Name:Boby Glasgow   :1945   MR#:[id number]    SEX:male     Planned procedure:  paracentesis  Indication: Ascites    H&P Status: H&P was reviewed, the patient was examined and no change has occurred in patient's condition since H&P was completed.    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  Initially treated with vancomycin but had a supratherapeutic level, hence, it was substituted by daptomycin.  Cumulative 6-week course of therapy will be completed on 3/13/2023.    Anxiety     Chronic heart failure, unspecified heart failure type (HCC) 2023    Chronic heart failure, unspecified heart failure type (HCC) 2023    Cirrhosis, alcoholic (HCC)     Diabetes mellitus (HCC)     GERD (gastroesophageal reflux disease)     GERD (gastroesophageal reflux disease)     Hyperlipidemia     Hypertension     Meniere's disease     Portal hypertension (HCC)     Pulmonary nodules     Secondary esophageal varices without bleeding (HCC)     Sleep apnea     SOB (shortness of breath)     Thyroid disease         Past Surgical History:  Past Surgical History:   Procedure Laterality Date    APPENDECTOMY      CHOLECYSTECTOMY      COLONOSCOPY      ENDOSCOPY, COLON, DIAGNOSTIC      FOOT SURGERY      needle removed,not sure which foot, per wife.    TONSILLECTOMY      UPPER GASTROINTESTINAL ENDOSCOPY N/A 2022    EGD BIOPSY performed by Travis Phelan MD at [facility] ENDOSCOPY    VASECTOMY         Social History:  Social History     Socioeconomic History    Marital status:      Spouse name: Not on file    Number of children: Not on file    Years of education: Not on file    Highest education level: Not on file   Occupational History     Comment: Retired    Tobacco Use    Smoking status: Former     Current packs/day: 0.00     
Hands drawn involuntarily       Medications:  Current Outpatient Medications on File Prior to Encounter   Medication Sig Dispense Refill    furosemide (LASIX) 40 MG tablet Take 1 tablet by mouth once daily 60 tablet 0    DULoxetine (CYMBALTA) 60 MG extended release capsule Take 1 capsule by mouth once daily 90 capsule 0    lactulose (CONSTULOSE) 10 GM/15ML solution TAKE 30ML BY MOUTH THREE TIMES DAILY (HOLD IF HAVING DIARRHEA) 270 mL 11    rifAXIMin (XIFAXAN) 550 MG tablet Take 1 tablet by mouth 2 times daily 180 tablet 3    levothyroxine (SYNTHROID) 50 MCG tablet Take 1 tablet by mouth once daily 90 tablet 1    spironolactone (ALDACTONE) 100 MG tablet Take 1 tablet by mouth twice daily 60 tablet 0    Multiple Vitamin (MULTI VITAMIN MENS PO) Take 1 tablet by mouth daily      Omega-3 1000 MG CAPS Take 2 capsules by mouth daily      albuterol sulfate HFA (PROVENTIL;VENTOLIN;PROAIR) 108 (90 Base) MCG/ACT inhaler INHALE 2 PUFFS BY MOUTH 4 TIMES DAILY AS NEEDED FOR WHEEZING 1 each 2     No current facility-administered medications on file prior to encounter.       Review of Systems:  A 10 point review of systems was conducted and was negative with the exception of what is noted above.     Vital Signs:  Vitals:    01/09/25 1442 01/09/25 1445 01/09/25 1457 01/09/25 1500   BP: (!) 145/72 (!) 146/73 (!) 153/81 (!) 150/79   Pulse: 78 80 78 93   Resp: 13 15 14 13   SpO2: 98% 97% 94% 95%        Laboratory:  No results for input(s): \"WBC\", \"NA\", \"POTASSIUM\", \"CL\", \"CO2\", \"BUN\", \"CREATININE\", \"GLUCOSE\", \"INR\", \"CKMB\" in the last 72 hours.    Invalid input(s): \"HEMOGLOBIN\", \"HEMATOCRIT\", \"PLATELETS\", \"CA\", \"PT\", \"PTT\", \"CK1\", \"TROP\"      Mallampati Score III  ASA class III    Medications reviewed: No contraindications for coagulation or sedation plans    IMAGING DATA   None available for review    ASSESSMENT AND PLAN     Pt is a good candidate for paracentesis and is aware a 4 th year medical student is assisting me with the

## 2025-01-10 NOTE — BRIEF OP NOTE
Department of Interventional Radiology Post-Procedure Note      Date: 1/09/2025    Interventional Radiologist: Constantine    Procedure Performed:  paracentesis    H&P Status: H&P was reviewed, the patient was examined and no change has occurred in patient's condition since H&P was completed.    Pre-procedure Diagnosis:  Ascites    Post-procedure Diagnosis:  Same    Sedation:  none    Contrast used:  none    Estimated blood loss:  Minimal    Preliminary Findings:  Successful    Complications:  none    Full Report to Follow.    Electronically signed by Jennifer Fitch MD on 1/10/2025 at 10:44 AM

## 2025-01-13 NOTE — PROGRESS NOTES
IR Procedure at [facility]:  Spoke with patient's wife Gaby campo patient will arrive at 1200 At [facility] on 1/23/2025 for his procedure at 1300, patient has been given below instructions and will take his medications as scheduled.         Follow your directions as prescribed by the doctor for your procedure and medications.  3.   Consult your provider as to when to stop blood thinner  4.   Do not take any pain medication within 6 hours of your procedure  5.   Do not drink any alcoholic beverages or use any street drugs 24 hours before procedure.  6.   Please wear simple, loose fitting clothing to the hospital.  Do not bring valuables (money,             credit cards, checkbooks, etc.)     7.   If you  have a Living Will and Durable Power of  for Healthcare, please bring in a copy.  8.   Please bring picture ID,  insurance card, paperwork from the doctors office            (H & P, Consent,  & card for implantable devices).  9.   Report to the information desk on the ground floor.  10. Take a shower the night before or morning of your procedure, do not apply any lotion, oil or powder.  11. You will need a responsible adult

## 2025-01-14 LAB — NON-GYN CYTOLOGY REPORT: NORMAL

## 2025-01-16 ENCOUNTER — HOSPITAL ENCOUNTER (OUTPATIENT)
Dept: INTERVENTIONAL RADIOLOGY/VASCULAR | Age: 80
Discharge: HOME | End: 2025-01-16
Attending: INTERNAL MEDICINE
Payer: COMMERCIAL

## 2025-01-16 ENCOUNTER — HOSPITAL ENCOUNTER (OUTPATIENT)
Dept: INTERVENTIONAL RADIOLOGY/VASCULAR | Age: 80
Discharge: HOME | End: 2025-01-16
Payer: COMMERCIAL

## 2025-01-16 VITALS
DIASTOLIC BLOOD PRESSURE: 71 MMHG | OXYGEN SATURATION: 95 % | TEMPERATURE: 98 F | HEART RATE: 89 BPM | RESPIRATION RATE: 16 BRPM | SYSTOLIC BLOOD PRESSURE: 136 MMHG

## 2025-01-16 DIAGNOSIS — K74.60 LIVER DISEASE, CHRONIC, WITH CIRRHOSIS (HCC): ICD-10-CM

## 2025-01-16 DIAGNOSIS — K74.60 CIRRHOSIS OF LIVER WITH ASCITES, UNSPECIFIED HEPATIC CIRRHOSIS TYPE (HCC): ICD-10-CM

## 2025-01-16 DIAGNOSIS — K76.9 LIVER DISEASE, CHRONIC, WITH CIRRHOSIS (HCC): ICD-10-CM

## 2025-01-16 DIAGNOSIS — R18.8 CIRRHOSIS OF LIVER WITH ASCITES, UNSPECIFIED HEPATIC CIRRHOSIS TYPE (HCC): ICD-10-CM

## 2025-01-16 LAB
ALBUMIN FLD-MCNC: 0.7 G/DL
AMYLASE FLD-CCNC: 14 U/L
APPEARANCE FLD: NORMAL
BLASTS NFR FLD: 0 %
BODY FLD TYPE: NORMAL
CLOT CHECK: NORMAL
COLOR FLD: YELLOW
EOSINOPHIL NFR FLD: 0 %
ERYTHROCYTE [DISTWIDTH] IN BLOOD BY AUTOMATED COUNT: 17.2 % (ref 11.7–14.9)
GLUCOSE FLD-MCNC: 113 MG/DL
HCT VFR BLD AUTO: 25.5 % (ref 42–52)
HGB BLD-MCNC: 7.6 G/DL (ref 13.5–18)
INR PPP: 1.1
LDH FLD L TO P-CCNC: 46 U/L
LYMPHOCYTES NFR FLD: 15 %
MCH RBC QN AUTO: 25.2 PG (ref 27–31)
MCHC RBC AUTO-ENTMCNC: 29.8 G/DL (ref 32–36)
MCV RBC AUTO: 84.7 FL (ref 78–100)
MESOTHELIAL CELLS BODY FLUID: 11 %
MONOCYTES NFR FLD: 55 %
NEUTROPHILS NFR FLD: 30 %
PARTIAL THROMBOPLASTIN TIME: 31.7 SEC (ref 25.1–37.1)
PLATELET # BLD AUTO: 195 K/UL (ref 140–440)
PMV BLD AUTO: 9.3 FL (ref 7.5–11.1)
PROT FLD-MCNC: 1.4 G/DL
PROTHROMBIN TIME: 14.7 SEC (ref 11.7–14.5)
RBC # BLD AUTO: 3.01 M/UL (ref 4.6–6.2)
RBC # FLD: <2000 CELLS/UL
SPECIMEN TYPE: NORMAL
UNIDENT CELLS NFR FLD: 0 %
WBC # FLD: 124 CELLS/UL
WBC OTHER # BLD: 5.5 K/UL (ref 4–10.5)

## 2025-01-16 PROCEDURE — 82945 GLUCOSE OTHER FLUID: CPT

## 2025-01-16 PROCEDURE — 49083 ABD PARACENTESIS W/IMAGING: CPT

## 2025-01-16 PROCEDURE — C1729 CATH, DRAINAGE: HCPCS

## 2025-01-16 PROCEDURE — 6360000002 HC RX W HCPCS: Performed by: INTERNAL MEDICINE

## 2025-01-16 PROCEDURE — 6360000002 HC RX W HCPCS

## 2025-01-16 PROCEDURE — 87205 SMEAR GRAM STAIN: CPT

## 2025-01-16 PROCEDURE — 84157 ASSAY OF PROTEIN OTHER: CPT

## 2025-01-16 PROCEDURE — 87070 CULTURE OTHR SPECIMN AEROBIC: CPT

## 2025-01-16 PROCEDURE — P9047 ALBUMIN (HUMAN), 25%, 50ML: HCPCS | Performed by: INTERNAL MEDICINE

## 2025-01-16 PROCEDURE — 87075 CULTR BACTERIA EXCEPT BLOOD: CPT

## 2025-01-16 PROCEDURE — 82042 OTHER SOURCE ALBUMIN QUAN EA: CPT

## 2025-01-16 PROCEDURE — 85730 THROMBOPLASTIN TIME PARTIAL: CPT

## 2025-01-16 PROCEDURE — P9047 ALBUMIN (HUMAN), 25%, 50ML: HCPCS

## 2025-01-16 PROCEDURE — 89051 BODY FLUID CELL COUNT: CPT

## 2025-01-16 PROCEDURE — 82150 ASSAY OF AMYLASE: CPT

## 2025-01-16 PROCEDURE — 85610 PROTHROMBIN TIME: CPT

## 2025-01-16 PROCEDURE — 85027 COMPLETE CBC AUTOMATED: CPT

## 2025-01-16 PROCEDURE — 83615 LACTATE (LD) (LDH) ENZYME: CPT

## 2025-01-16 RX ORDER — ALBUMIN (HUMAN) 12.5 G/50ML
SOLUTION INTRAVENOUS CONTINUOUS PRN
Status: COMPLETED | OUTPATIENT
Start: 2025-01-16 | End: 2025-01-16

## 2025-01-16 RX ADMIN — ALBUMIN (HUMAN) 62.5 G: 0.25 INJECTION, SOLUTION INTRAVENOUS at 14:44

## 2025-01-16 ASSESSMENT — PAIN - FUNCTIONAL ASSESSMENT: PAIN_FUNCTIONAL_ASSESSMENT: NONE - DENIES PAIN

## 2025-01-16 NOTE — PROGRESS NOTES
TRANSFER - OUT REPORT:    Verbal report given to Key on Boby Glasgow being transferred to IR holding for routine post-op       Report consisted of patient's Situation, Background, Assessment and   Recommendations(SBAR).     Information from the following report(s) Nurse Handoff Report was reviewed with the receiving nurse.    Opportunity for questions and clarification was provided.      Patient transported with:   Registered Nurse

## 2025-01-16 NOTE — OR NURSING
Paracentesis completed.  10,400cc of yellow ascitic fluid removed.  1 L sent to lab.  62.5 grams of Albumin given.

## 2025-01-16 NOTE — CONSULTS
Hands drawn involuntarily       Medications:  Current Outpatient Medications on File Prior to Encounter   Medication Sig Dispense Refill    furosemide (LASIX) 40 MG tablet Take 1 tablet by mouth once daily 60 tablet 0    DULoxetine (CYMBALTA) 60 MG extended release capsule Take 1 capsule by mouth once daily 90 capsule 0    lactulose (CONSTULOSE) 10 GM/15ML solution TAKE 30ML BY MOUTH THREE TIMES DAILY (HOLD IF HAVING DIARRHEA) 270 mL 11    rifAXIMin (XIFAXAN) 550 MG tablet Take 1 tablet by mouth 2 times daily 180 tablet 3    levothyroxine (SYNTHROID) 50 MCG tablet Take 1 tablet by mouth once daily 90 tablet 1    spironolactone (ALDACTONE) 100 MG tablet Take 1 tablet by mouth twice daily 60 tablet 0    albuterol sulfate HFA (PROVENTIL;VENTOLIN;PROAIR) 108 (90 Base) MCG/ACT inhaler INHALE 2 PUFFS BY MOUTH 4 TIMES DAILY AS NEEDED FOR WHEEZING 1 each 2    Multiple Vitamin (MULTI VITAMIN MENS PO) Take 1 tablet by mouth daily      Omega-3 1000 MG CAPS Take 2 capsules by mouth daily       No current facility-administered medications on file prior to encounter.       Review of Systems:  A 10 point review of systems was conducted and was negative with the exception of what is noted above.     Vital Signs:  Vitals:    01/16/25 1423 01/16/25 1428 01/16/25 1438 01/16/25 1452   BP: 133/66 (!) 140/68 (!) 146/73 136/71   Pulse: 84 95 90 89   Resp:    16   Temp:       TempSrc:       SpO2: 96% 98% 95% 95%        Laboratory:  Recent Labs     01/16/25  1235   WBC 5.5   INR 1.1         Mallampati Score III  ASA class III    Medications reviewed: No contraindications for coagulation or sedation plans    IMAGING DATA   None available for review    ASSESSMENT AND PLAN     Pt is a good candidate for paracentesis      Procedure, RBA explained to patient and available family. Informed consent obtained    Electronically signed by Jennifer Fitch MD on 1/16/2025 at 5:15 PM

## 2025-01-16 NOTE — BRIEF OP NOTE
Department of Interventional Radiology Post-Procedure Note      Date: 1/16/2025     Interventional Radiologist: Constantine    Procedure Performed:  paracentesis    H&P Status: H&P was reviewed, the patient was examined and no change has occurred in patient's condition since H&P was completed.    Pre-procedure Diagnosis:  Ascites    Post-procedure Diagnosis:  Same    Sedation:  none    Contrast used:  none    Estimated blood loss:  Minimal    Preliminary Findings:  Successful    Complications:  none    Full Report to Follow.    Electronically signed by Jennifer Fitch MD on 1/16/2025 at 5:16 PM

## 2025-01-18 DIAGNOSIS — E03.9 ACQUIRED HYPOTHYROIDISM: ICD-10-CM

## 2025-01-20 RX ORDER — LEVOTHYROXINE SODIUM 50 UG/1
50 TABLET ORAL DAILY
Qty: 90 TABLET | Refills: 0 | Status: SHIPPED | OUTPATIENT
Start: 2025-01-20

## 2025-01-21 LAB — NON-GYN CYTOLOGY REPORT: NORMAL

## 2025-01-23 ENCOUNTER — HOSPITAL ENCOUNTER (OUTPATIENT)
Dept: INTERVENTIONAL RADIOLOGY/VASCULAR | Age: 80
Discharge: HOME | End: 2025-01-23
Payer: COMMERCIAL

## 2025-01-23 VITALS
DIASTOLIC BLOOD PRESSURE: 73 MMHG | RESPIRATION RATE: 16 BRPM | OXYGEN SATURATION: 96 % | HEART RATE: 90 BPM | SYSTOLIC BLOOD PRESSURE: 164 MMHG

## 2025-01-23 DIAGNOSIS — R18.8 CIRRHOSIS OF LIVER WITH ASCITES, UNSPECIFIED HEPATIC CIRRHOSIS TYPE (HCC): ICD-10-CM

## 2025-01-23 DIAGNOSIS — K74.60 CIRRHOSIS OF LIVER WITH ASCITES, UNSPECIFIED HEPATIC CIRRHOSIS TYPE (HCC): ICD-10-CM

## 2025-01-23 LAB
APPEARANCE FLD: NORMAL
BODY FLD TYPE: NORMAL
CLOT CHECK: NORMAL
COLOR FLD: YELLOW
LYMPHOCYTES NFR FLD: 48 %
MESOTHELIAL CELLS BODY FLUID: 10 %
NEUTROPHILS NFR FLD: 42 %
RBC # FLD: <2000 CELLS/UL
WBC # FLD: 116 CELLS/UL

## 2025-01-23 PROCEDURE — 87205 SMEAR GRAM STAIN: CPT

## 2025-01-23 PROCEDURE — 87070 CULTURE OTHR SPECIMN AEROBIC: CPT

## 2025-01-23 PROCEDURE — 89051 BODY FLUID CELL COUNT: CPT

## 2025-01-23 PROCEDURE — P9047 ALBUMIN (HUMAN), 25%, 50ML: HCPCS

## 2025-01-23 PROCEDURE — 2709999900 IR US GUIDED PARACENTESIS

## 2025-01-23 PROCEDURE — 6360000002 HC RX W HCPCS: Performed by: RADIOLOGY

## 2025-01-23 PROCEDURE — 6360000002 HC RX W HCPCS

## 2025-01-23 PROCEDURE — 87075 CULTR BACTERIA EXCEPT BLOOD: CPT

## 2025-01-23 PROCEDURE — 49083 ABD PARACENTESIS W/IMAGING: CPT

## 2025-01-23 RX ORDER — LIDOCAINE HYDROCHLORIDE 10 MG/ML
INJECTION, SOLUTION EPIDURAL; INFILTRATION; INTRACAUDAL; PERINEURAL PRN
Status: COMPLETED | OUTPATIENT
Start: 2025-01-23 | End: 2025-01-23

## 2025-01-23 RX ADMIN — LIDOCAINE HYDROCHLORIDE 11 ML: 10 INJECTION, SOLUTION EPIDURAL; INFILTRATION; INTRACAUDAL; PERINEURAL at 15:16

## 2025-01-23 NOTE — CONSULTS
Consult Interventional Radiology    Date:2025  Name:Boby Glasgow   :1945   MR#:[id number]    SEX:male     Planned procedure:  paracentesis  Indication: ascites    H&P Status: H&P was reviewed, the patient was examined and no change has occurred in patient's condition since H&P was completed.    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  Initially treated with vancomycin but had a supratherapeutic level, hence, it was substituted by daptomycin.  Cumulative 6-week course of therapy will be completed on 3/13/2023.    Anxiety     Chronic heart failure, unspecified heart failure type (HCC) 2023    Chronic heart failure, unspecified heart failure type (HCC) 2023    Cirrhosis, alcoholic (HCC)     Diabetes mellitus (HCC)     GERD (gastroesophageal reflux disease)     GERD (gastroesophageal reflux disease)     Hyperlipidemia     Hypertension     Meniere's disease     Portal hypertension (HCC)     Pulmonary nodules     Secondary esophageal varices without bleeding (HCC)     Sleep apnea     SOB (shortness of breath)     Thyroid disease         Past Surgical History:  Past Surgical History:   Procedure Laterality Date    APPENDECTOMY      CHOLECYSTECTOMY      COLONOSCOPY      ENDOSCOPY, COLON, DIAGNOSTIC      FOOT SURGERY      needle removed,not sure which foot, per wife.    TONSILLECTOMY      UPPER GASTROINTESTINAL ENDOSCOPY N/A 2022    EGD BIOPSY performed by Travis Phelan MD at [facility] ENDOSCOPY    VASECTOMY         Social History:  Social History     Socioeconomic History    Marital status:      Spouse name: Not on file    Number of children: Not on file    Years of education: Not on file    Highest education level: Not on file   Occupational History     Comment: Retired    Tobacco Use    Smoking status: Former     Current packs/day: 0.00

## 2025-01-23 NOTE — PROGRESS NOTES
TRANSFER - OUT REPORT:    Verbal report given to Natalia RN and Mili RN on Boby Glasgow being transferred to IR holding for routine post-op       Report consisted of patient's Situation, Background, Assessment and   Recommendations(SBAR).     Information from the following report(s) Nurse Handoff Report was reviewed with the receiving nurse.    Opportunity for questions and clarification was provided.      Patient transported with:   Registered Nurse    Successful paracentesis performed. 7100cc of cloudy white fluid was drained. 1000cc was sent tot he lab per order. 50g of albumin was given.

## 2025-01-23 NOTE — PROGRESS NOTES
IR Procedure at [facility]: Spoke with patient's wife Gaby and patient  will arrive at 1200 for his procedure at [facility] on 1/30/2025.Patient has been given below instructions and will take his medications as scheduled.       Follow your directions as prescribed by the doctor for your procedure and medications.  3.   Consult your provider as to when to stop blood thinner  4.   Do not take any pain medication within 6 hours of your procedure  5.   Do not drink any alcoholic beverages or use any street drugs 24 hours before procedure.  6.   Please wear simple, loose fitting clothing to the hospital.  Do not bring valuables (money,             credit cards, checkbooks, etc.)     7.   If you  have a Living Will and Durable Power of  for Healthcare, please bring in a copy.  8.   Please bring picture ID,  insurance card, paperwork from the doctors office            (H & P, Consent,  & card for implantable devices).  9.   Report to the information desk on the ground floor.  10. Take a shower the night before or morning of your procedure, do not apply any lotion, oil or powder.  11. You will need a responsible adult

## 2025-01-23 NOTE — PROGRESS NOTES
Pt arrived for planned paracentesis.  Vitals WNL.  IV obtained.  Labs up to date. Pt is comfortable.

## 2025-01-23 NOTE — BRIEF OP NOTE
Department of Interventional Radiology Post-Procedure Note      Date: 1/23/2025     Interventional Radiologist: Constantine    Procedure Performed:  paracentesis    H&P Status: H&P was reviewed, the patient was examined and no change has occurred in patient's condition since H&P was completed.    Pre-procedure Diagnosis:  ascites    Post-procedure Diagnosis:  same    Sedation:  none    Contrast used:  none    Estimated blood loss:  Minimal    Preliminary Findings:  Successful    Complications:  none    Full Report to Follow.    Electronically signed by Jennifer Fitch MD on 1/23/2025 at 3:19 PM

## 2025-01-28 DIAGNOSIS — K74.60 DECOMPENSATION OF CIRRHOSIS OF LIVER (HCC): ICD-10-CM

## 2025-01-28 DIAGNOSIS — K72.90 DECOMPENSATION OF CIRRHOSIS OF LIVER (HCC): ICD-10-CM

## 2025-01-30 ENCOUNTER — HOSPITAL ENCOUNTER (OUTPATIENT)
Dept: INTERVENTIONAL RADIOLOGY/VASCULAR | Age: 80
Discharge: HOME | End: 2025-01-30
Payer: COMMERCIAL

## 2025-01-30 VITALS
DIASTOLIC BLOOD PRESSURE: 79 MMHG | BODY MASS INDEX: 37.89 KG/M2 | RESPIRATION RATE: 18 BRPM | HEIGHT: 68 IN | WEIGHT: 250 LBS | SYSTOLIC BLOOD PRESSURE: 151 MMHG | OXYGEN SATURATION: 97 % | HEART RATE: 87 BPM

## 2025-01-30 DIAGNOSIS — K74.60 CIRRHOSIS OF LIVER WITH ASCITES, UNSPECIFIED HEPATIC CIRRHOSIS TYPE (HCC): ICD-10-CM

## 2025-01-30 DIAGNOSIS — R18.8 CIRRHOSIS OF LIVER WITH ASCITES, UNSPECIFIED HEPATIC CIRRHOSIS TYPE (HCC): ICD-10-CM

## 2025-01-30 LAB
APPEARANCE FLD: NORMAL
BODY FLD TYPE: NORMAL
CLOT CHECK: NORMAL
COLOR FLD: YELLOW
LYMPHOCYTES NFR FLD: 50 %
MESOTHELIAL CELLS BODY FLUID: 1 %
MONOCYTES NFR FLD: 23 %
NEUTROPHILS NFR FLD: 26 %
RBC # FLD: <2000 CELLS/UL
WBC # FLD: 143 CELLS/UL

## 2025-01-30 PROCEDURE — P9047 ALBUMIN (HUMAN), 25%, 50ML: HCPCS

## 2025-01-30 PROCEDURE — 87205 SMEAR GRAM STAIN: CPT

## 2025-01-30 PROCEDURE — 87077 CULTURE AEROBIC IDENTIFY: CPT

## 2025-01-30 PROCEDURE — P9047 ALBUMIN (HUMAN), 25%, 50ML: HCPCS | Performed by: RADIOLOGY

## 2025-01-30 PROCEDURE — 87070 CULTURE OTHR SPECIMN AEROBIC: CPT

## 2025-01-30 PROCEDURE — 2709999900 IR US GUIDED PARACENTESIS

## 2025-01-30 PROCEDURE — 6360000002 HC RX W HCPCS: Performed by: RADIOLOGY

## 2025-01-30 PROCEDURE — 89051 BODY FLUID CELL COUNT: CPT

## 2025-01-30 PROCEDURE — 87075 CULTR BACTERIA EXCEPT BLOOD: CPT

## 2025-01-30 PROCEDURE — 6360000002 HC RX W HCPCS

## 2025-01-30 PROCEDURE — 49083 ABD PARACENTESIS W/IMAGING: CPT

## 2025-01-30 RX ORDER — ALBUMIN (HUMAN) 12.5 G/50ML
SOLUTION INTRAVENOUS CONTINUOUS PRN
Status: COMPLETED | OUTPATIENT
Start: 2025-01-30 | End: 2025-01-30

## 2025-01-30 RX ORDER — LIDOCAINE HYDROCHLORIDE 10 MG/ML
INJECTION, SOLUTION EPIDURAL; INFILTRATION; INTRACAUDAL; PERINEURAL PRN
Status: COMPLETED | OUTPATIENT
Start: 2025-01-30 | End: 2025-01-30

## 2025-01-30 RX ADMIN — ALBUMIN (HUMAN) 50 G: 0.25 INJECTION, SOLUTION INTRAVENOUS at 14:19

## 2025-01-30 RX ADMIN — LIDOCAINE HYDROCHLORIDE 10 MG: 10 INJECTION, SOLUTION EPIDURAL; INFILTRATION; INTRACAUDAL; PERINEURAL at 13:52

## 2025-01-30 NOTE — PROGRESS NOTES
IR Procedure at [facility]: Spoke with patient's wife Gaby and patient will arrive at 1200 at [facility] on 2/6/2025 for his procedure at 1300. Patient has been given below instructions and will take his medications as scheduled.          Follow your directions as prescribed by the doctor for your procedure and medications.  3.   Consult your provider as to when to stop blood thinner  4.   Do not take any pain medication within 6 hours of your procedure  5.   Do not drink any alcoholic beverages or use any street drugs 24 hours before procedure.  6.   Please wear simple, loose fitting clothing to the hospital.  Do not bring valuables (money,             credit cards, checkbooks, etc.)     7.   If you  have a Living Will and Durable Power of  for Healthcare, please bring in a copy.  8.   Please bring picture ID,  insurance card, paperwork from the doctors office            (H & P, Consent,  & card for implantable devices).  9.   Report to the information desk on the ground floor.  10. Take a shower the night before or morning of your procedure, do not apply any lotion, oil or powder.  11. You will need a responsible adult

## 2025-01-30 NOTE — CONSULTS
Hands drawn involuntarily       Medications:  Current Outpatient Medications on File Prior to Encounter   Medication Sig Dispense Refill    levothyroxine (SYNTHROID) 50 MCG tablet Take 1 tablet by mouth once daily 90 tablet 0    furosemide (LASIX) 40 MG tablet Take 1 tablet by mouth once daily 60 tablet 0    DULoxetine (CYMBALTA) 60 MG extended release capsule Take 1 capsule by mouth once daily 90 capsule 0    lactulose (CONSTULOSE) 10 GM/15ML solution TAKE 30ML BY MOUTH THREE TIMES DAILY (HOLD IF HAVING DIARRHEA) 270 mL 11    rifAXIMin (XIFAXAN) 550 MG tablet Take 1 tablet by mouth 2 times daily 180 tablet 3    spironolactone (ALDACTONE) 100 MG tablet Take 1 tablet by mouth twice daily 60 tablet 0    albuterol sulfate HFA (PROVENTIL;VENTOLIN;PROAIR) 108 (90 Base) MCG/ACT inhaler INHALE 2 PUFFS BY MOUTH 4 TIMES DAILY AS NEEDED FOR WHEEZING 1 each 2    Multiple Vitamin (MULTI VITAMIN MENS PO) Take 1 tablet by mouth daily      Omega-3 1000 MG CAPS Take 2 capsules by mouth daily       No current facility-administered medications on file prior to encounter.       Review of Systems:  A 10 point review of systems was conducted and was negative with the exception of what is noted above.     Vital Signs:  Vitals:    01/30/25 1406 01/30/25 1416 01/30/25 1425 01/30/25 1430   BP: (!) 172/81 (!) 173/81 (!) 166/73 (!) 151/79   Pulse: 85 86 88 87   Resp: 16 16 18    SpO2: 97% 96% 95% 97%   Weight:       Height:            Laboratory:  No results for input(s): \"WBC\", \"NA\", \"POTASSIUM\", \"CL\", \"CO2\", \"BUN\", \"CREATININE\", \"GLUCOSE\", \"INR\", \"CKMB\" in the last 72 hours.    Invalid input(s): \"HEMOGLOBIN\", \"HEMATOCRIT\", \"PLATELETS\", \"CA\", \"PT\", \"PTT\", \"CK1\", \"TROP\"      Mallampati Score III  ASA class III    Medications reviewed: No contraindications for coagulation or sedation plans    IMAGING DATA   None available for review    ASSESSMENT AND PLAN     Pt is a good candidate for paracentesis    Procedure, RBA explained to patient and

## 2025-01-30 NOTE — PROGRESS NOTES
TRANSFER - OUT REPORT:    Verbal report given to Humaira RN and Mili RN on Boby Glasgow being transferred to IR holding for routine post-op       Report consisted of patient's Situation, Background, Assessment and   Recommendations(SBAR).     Information from the following report(s) Nurse Handoff Report was reviewed with the receiving nurse.    Opportunity for questions and clarification was provided.      Patient transported with:   Registered Nurse    Successful paracentesis. 9570cc of milky white fluid was drained. 50g of albumin was given.

## 2025-01-30 NOTE — PROGRESS NOTES
Pt to post op holding, VSS, denies pain, dressing clean dry and intact. Discharge education complete.

## 2025-01-30 NOTE — BRIEF OP NOTE
Department of Interventional Radiology Post-Procedure Note      Date: 1/30/2025     Interventional Radiologist: Constantine    Procedure Performed:  paracentesis    H&P Status: H&P was reviewed, the patient was examined and no change has occurred in patient's condition since H&P was completed.    Pre-procedure Diagnosis:  ascites    Post-procedure Diagnosis: Same    Sedation:  none    Contrast used:  none    Estimated blood loss:  Minimal    Preliminary Findings:  Successful    Complications:  none    Full Report to Follow.    Electronically signed by Jennifer Fitch MD on 1/30/2025 at 5:01 PM

## 2025-02-01 DIAGNOSIS — K74.60 DECOMPENSATION OF CIRRHOSIS OF LIVER (HCC): ICD-10-CM

## 2025-02-01 DIAGNOSIS — K72.90 DECOMPENSATION OF CIRRHOSIS OF LIVER (HCC): ICD-10-CM

## 2025-02-02 LAB
MICROORGANISM SPEC CULT: ABNORMAL
MICROORGANISM/AGENT SPEC: ABNORMAL
SERVICE CMNT-IMP: ABNORMAL
SPECIMEN DESCRIPTION: ABNORMAL

## 2025-02-04 RX ORDER — ALBUTEROL SULFATE 90 UG/1
INHALANT RESPIRATORY (INHALATION)
Qty: 9 G | Refills: 0 | Status: SHIPPED | OUTPATIENT
Start: 2025-02-04

## 2025-02-06 ENCOUNTER — HOSPITAL ENCOUNTER (OUTPATIENT)
Dept: INTERVENTIONAL RADIOLOGY/VASCULAR | Age: 80
Discharge: HOME | End: 2025-02-06
Payer: COMMERCIAL

## 2025-02-06 VITALS — SYSTOLIC BLOOD PRESSURE: 146 MMHG | HEART RATE: 83 BPM | DIASTOLIC BLOOD PRESSURE: 80 MMHG | OXYGEN SATURATION: 97 %

## 2025-02-06 DIAGNOSIS — K70.31 ALCOHOLIC CIRRHOSIS OF LIVER WITH ASCITES (HCC): ICD-10-CM

## 2025-02-06 LAB
ALBUMIN FLD-MCNC: 0.6 G/DL
AMYLASE FLD-CCNC: 12 U/L
APPEARANCE FLD: NORMAL
BLASTS NFR FLD: 0 %
BODY FLD TYPE: NORMAL
CLOT CHECK: NORMAL
COLOR FLD: YELLOW
EOSINOPHIL NFR FLD: 0 %
GLUCOSE FLD-MCNC: 113 MG/DL
LDH FLD L TO P-CCNC: 44 U/L
LYMPHOCYTES NFR FLD: 64 %
MESOTHELIAL CELLS BODY FLUID: 14 %
MONOCYTES NFR FLD: 36 %
NEUTROPHILS NFR FLD: 0 %
PROT FLD-MCNC: 1.3 G/DL
RBC # FLD: <2000 CELLS/UL
SPECIMEN TYPE: NORMAL
UNIDENT CELLS NFR FLD: 0 %
WBC # FLD: 122 CELLS/UL

## 2025-02-06 PROCEDURE — 49083 ABD PARACENTESIS W/IMAGING: CPT

## 2025-02-06 PROCEDURE — 6360000002 HC RX W HCPCS

## 2025-02-06 PROCEDURE — P9047 ALBUMIN (HUMAN), 25%, 50ML: HCPCS

## 2025-02-06 PROCEDURE — 84157 ASSAY OF PROTEIN OTHER: CPT

## 2025-02-06 PROCEDURE — 89051 BODY FLUID CELL COUNT: CPT

## 2025-02-06 PROCEDURE — P9045 ALBUMIN (HUMAN), 5%, 250 ML: HCPCS | Performed by: INTERNAL MEDICINE

## 2025-02-06 PROCEDURE — 83615 LACTATE (LD) (LDH) ENZYME: CPT

## 2025-02-06 PROCEDURE — 6360000002 HC RX W HCPCS: Performed by: INTERNAL MEDICINE

## 2025-02-06 PROCEDURE — 82150 ASSAY OF AMYLASE: CPT

## 2025-02-06 PROCEDURE — 2709999900 IR US GUIDED PARACENTESIS

## 2025-02-06 PROCEDURE — 82042 OTHER SOURCE ALBUMIN QUAN EA: CPT

## 2025-02-06 PROCEDURE — 6360000002 HC RX W HCPCS: Performed by: RADIOLOGY

## 2025-02-06 PROCEDURE — 82945 GLUCOSE OTHER FLUID: CPT

## 2025-02-06 RX ORDER — ALBUMIN HUMAN 50 G/1000ML
SOLUTION INTRAVENOUS CONTINUOUS PRN
Status: COMPLETED | OUTPATIENT
Start: 2025-02-06 | End: 2025-02-06

## 2025-02-06 RX ORDER — LIDOCAINE HYDROCHLORIDE 10 MG/ML
INJECTION, SOLUTION EPIDURAL; INFILTRATION; INTRACAUDAL; PERINEURAL PRN
Status: COMPLETED | OUTPATIENT
Start: 2025-02-06 | End: 2025-02-06

## 2025-02-06 RX ADMIN — ALBUMIN (HUMAN) 12.5 G: 12.5 INJECTION, SOLUTION INTRAVENOUS at 14:43

## 2025-02-06 RX ADMIN — ALBUMIN (HUMAN) 12.5 G: 12.5 INJECTION, SOLUTION INTRAVENOUS at 14:41

## 2025-02-06 RX ADMIN — ALBUMIN (HUMAN) 12.5 G: 12.5 INJECTION, SOLUTION INTRAVENOUS at 14:49

## 2025-02-06 RX ADMIN — LIDOCAINE HYDROCHLORIDE 8 ML: 10 INJECTION, SOLUTION EPIDURAL; INFILTRATION; INTRACAUDAL; PERINEURAL at 14:22

## 2025-02-06 NOTE — PROGRESS NOTES
TRANSFER - OUT REPORT:    Verbal report given to Tony on Boby Glasgow being transferred to IR [facility] for routine progression of patient care       Report consisted of patient's Situation, Background, Assessment and   Recommendations(SBAR).     Information from the following report(s) Nurse Handoff Report was reviewed with the receiving nurse.    Opportunity for questions and clarification was provided.      Patient transported with:   Registered Nurse

## 2025-02-10 DIAGNOSIS — K74.60 CIRRHOSIS OF LIVER WITH ASCITES, UNSPECIFIED HEPATIC CIRRHOSIS TYPE (HCC): Primary | ICD-10-CM

## 2025-02-10 DIAGNOSIS — K72.90 DECOMPENSATION OF CIRRHOSIS OF LIVER (HCC): ICD-10-CM

## 2025-02-10 DIAGNOSIS — K74.60 DECOMPENSATION OF CIRRHOSIS OF LIVER (HCC): ICD-10-CM

## 2025-02-10 DIAGNOSIS — R18.8 CIRRHOSIS OF LIVER WITH ASCITES, UNSPECIFIED HEPATIC CIRRHOSIS TYPE (HCC): Primary | ICD-10-CM

## 2025-02-10 LAB — NON-GYN CYTOLOGY REPORT: NORMAL

## 2025-02-11 NOTE — PROGRESS NOTES
IR Procedure at [facility]: Spoke with patient's wife Gaby and patient will arrive at 1030 at [facility] on 2/13/2025 for his procedure at 1130. Patient has been given below instructions and will take his medications as scheduled.          Follow your directions as prescribed by the doctor for your procedure and medications.  3.   Consult your provider as to when to stop blood thinner  4.   Do not take any pain medication within 6 hours of your procedure  5.   Do not drink any alcoholic beverages or use any street drugs 24 hours before procedure.  6.   Please wear simple, loose fitting clothing to the hospital.  Do not bring valuables (money,             credit cards, checkbooks, etc.)     7.   If you  have a Living Will and Durable Power of  for Healthcare, please bring in a copy.  8.   Please bring picture ID,  insurance card, paperwork from the doctors office            (H & P, Consent,  & card for implantable devices).  9.   Report to the information desk on the ground floor.  10. Take a shower the night before or morning of your procedure, do not apply any lotion, oil or powder.  11. You will need a responsible adult

## 2025-02-12 NOTE — PROGRESS NOTES
IR Procedure at [facility]: Unable to leave a message, mailbox is full, I will try again later.      Follow your directions as prescribed by the doctor for your procedure and medications.  3.   Consult your provider as to when to stop blood thinner  4.   Do not take any pain medication within 6 hours of your procedure  5.   Do not drink any alcoholic beverages or use any street drugs 24 hours before procedure.  6.   Please wear simple, loose fitting clothing to the hospital.  Do not bring valuables (money,             credit cards, checkbooks, etc.)     7.   If you  have a Living Will and Durable Power of  for Healthcare, please bring in a copy.  8.   Please bring picture ID,  insurance card, paperwork from the doctors office            (H & P, Consent,  & card for implantable devices).  9.   Report to the information desk on the ground floor.  10. Take a shower the night before or morning of your procedure, do not apply any lotion, oil or powder.  11. You will need a responsible adult

## 2025-02-13 ENCOUNTER — HOSPITAL ENCOUNTER (OUTPATIENT)
Dept: INTERVENTIONAL RADIOLOGY/VASCULAR | Age: 80
Discharge: HOME | End: 2025-02-13
Payer: COMMERCIAL

## 2025-02-13 VITALS
SYSTOLIC BLOOD PRESSURE: 150 MMHG | DIASTOLIC BLOOD PRESSURE: 71 MMHG | WEIGHT: 245 LBS | OXYGEN SATURATION: 94 % | BODY MASS INDEX: 37.13 KG/M2 | HEART RATE: 88 BPM | HEIGHT: 68 IN | RESPIRATION RATE: 18 BRPM

## 2025-02-13 DIAGNOSIS — R18.8 CIRRHOSIS OF LIVER WITH ASCITES, UNSPECIFIED HEPATIC CIRRHOSIS TYPE (HCC): ICD-10-CM

## 2025-02-13 DIAGNOSIS — K72.90 DECOMPENSATION OF CIRRHOSIS OF LIVER (HCC): ICD-10-CM

## 2025-02-13 DIAGNOSIS — K74.60 DECOMPENSATION OF CIRRHOSIS OF LIVER (HCC): ICD-10-CM

## 2025-02-13 DIAGNOSIS — K74.60 CIRRHOSIS OF LIVER WITH ASCITES, UNSPECIFIED HEPATIC CIRRHOSIS TYPE (HCC): ICD-10-CM

## 2025-02-13 LAB
APPEARANCE FLD: NORMAL
BODY FLD TYPE: NORMAL
CLOT CHECK: NORMAL
COLOR FLD: YELLOW
ERYTHROCYTE [DISTWIDTH] IN BLOOD BY AUTOMATED COUNT: 17.6 % (ref 11.7–14.9)
HCT VFR BLD AUTO: 25 % (ref 42–52)
HGB BLD-MCNC: 7.4 G/DL (ref 13.5–18)
INR PPP: 1.2
MCH RBC QN AUTO: 24.8 PG (ref 27–31)
MCHC RBC AUTO-ENTMCNC: 29.6 G/DL (ref 32–36)
MCV RBC AUTO: 83.9 FL (ref 78–100)
MONOCYTES NFR FLD: 87 %
NEUTROPHILS NFR FLD: 13 %
PARTIAL THROMBOPLASTIN TIME: 28.5 SEC (ref 25.1–37.1)
PLATELET # BLD AUTO: 225 K/UL (ref 140–440)
PMV BLD AUTO: 9.7 FL (ref 7.5–11.1)
PROTHROMBIN TIME: 15.2 SEC (ref 11.7–14.5)
RBC # BLD AUTO: 2.98 M/UL (ref 4.6–6.2)
RBC # FLD: <2000 CELLS/UL
WBC # FLD: 105 CELLS/UL
WBC OTHER # BLD: 6.1 K/UL (ref 4–10.5)

## 2025-02-13 PROCEDURE — 87070 CULTURE OTHR SPECIMN AEROBIC: CPT

## 2025-02-13 PROCEDURE — P9047 ALBUMIN (HUMAN), 25%, 50ML: HCPCS

## 2025-02-13 PROCEDURE — 87075 CULTR BACTERIA EXCEPT BLOOD: CPT

## 2025-02-13 PROCEDURE — 89051 BODY FLUID CELL COUNT: CPT

## 2025-02-13 PROCEDURE — P9047 ALBUMIN (HUMAN), 25%, 50ML: HCPCS | Performed by: SPECIALIST

## 2025-02-13 PROCEDURE — 6360000002 HC RX W HCPCS

## 2025-02-13 PROCEDURE — 85027 COMPLETE CBC AUTOMATED: CPT

## 2025-02-13 PROCEDURE — 87205 SMEAR GRAM STAIN: CPT

## 2025-02-13 PROCEDURE — 85730 THROMBOPLASTIN TIME PARTIAL: CPT

## 2025-02-13 PROCEDURE — 6360000002 HC RX W HCPCS: Performed by: SPECIALIST

## 2025-02-13 PROCEDURE — 49083 ABD PARACENTESIS W/IMAGING: CPT

## 2025-02-13 PROCEDURE — C1729 CATH, DRAINAGE: HCPCS

## 2025-02-13 PROCEDURE — 85610 PROTHROMBIN TIME: CPT

## 2025-02-13 RX ORDER — ALBUMIN (HUMAN) 12.5 G/50ML
SOLUTION INTRAVENOUS CONTINUOUS PRN
Status: COMPLETED | OUTPATIENT
Start: 2025-02-13 | End: 2025-02-13

## 2025-02-13 RX ADMIN — ALBUMIN (HUMAN) 62.5 G: 0.25 INJECTION, SOLUTION INTRAVENOUS at 12:11

## 2025-02-13 NOTE — PROGRESS NOTES
TRANSFER - OUT REPORT:    Verbal report given to Marj RN and Lainey RN on Boby Glasgow being transferred to IR holding for routine post-op       Report consisted of patient's Situation, Background, Assessment and   Recommendations(SBAR).     Information from the following report(s) Nurse Handoff Report was reviewed with the receiving nurse.    Opportunity for questions and clarification was provided.      Patient transported with:   Registered Nurse    Successful paracentesis. 8800cc of milky white fluid was drained. 62.5g of albumin was given

## 2025-02-13 NOTE — PROGRESS NOTES
Returned to IR holding s/p procedure in IR with Dr. Patel. Tolerated well. Denies pain or needs. ABD dressing is clean, dry, intact. Preparing for discharge soon.

## 2025-02-18 NOTE — PROGRESS NOTES
IR Procedure at [facility]:  Spoke with patient's wife Gaby and he will arrive at 1000 at [facility] on 2/20/2025 for his procedure at 1100. Patient has been given below instructions and will take his medications as scheduled.      Follow your directions as prescribed by the doctor for your procedure and medications.  3.   Consult your provider as to when to stop blood thinner  4.   Do not take any pain medication within 6 hours of your procedure  5.   Do not drink any alcoholic beverages or use any street drugs 24 hours before procedure.  6.   Please wear simple, loose fitting clothing to the hospital.  Do not bring valuables (money,             credit cards, checkbooks, etc.)     7.   If you  have a Living Will and Durable Power of  for Healthcare, please bring in a copy.  8.   Please bring picture ID,  insurance card, paperwork from the doctors office            (H & P, Consent,  & card for implantable devices).  9.   Report to the information desk on the ground floor.  10. Take a shower the night before or morning of your procedure, do not apply any lotion, oil or powder.  11. You will need a responsible adult

## 2025-02-19 NOTE — PROGRESS NOTES
IR Procedure at [facility]:  Spoke with patient's wife Gaby and patient will arrive at 1000 at [facility] on 2/27/2025 for his procedure at 1100. Patient has been given below instructions and will take his medications as scheduled.Gaby will call scheduling to get more appointments for procedure.    NPO at Midnight      Follow your directions as prescribed by the doctor for your procedure and medications.  3.   Consult your provider as to when to stop blood thinner  4.   Do not take any pain medication within 6 hours of your procedure  5.   Do not drink any alcoholic beverages or use any street drugs 24 hours before procedure.  6.   Please wear simple, loose fitting clothing to the hospital.  Do not bring valuables (money,             credit cards, checkbooks, etc.)     7.   If you  have a Living Will and Durable Power of  for Healthcare, please bring in a copy.  8.   Please bring picture ID,  insurance card, paperwork from the doctors office            (H & P, Consent,  & card for implantable devices).  9.   Report to the information desk on the ground floor.  10. Take a shower the night before or morning of your procedure, do not apply any lotion, oil or powder.  11. You will need a responsible adult

## 2025-02-20 ENCOUNTER — HOSPITAL ENCOUNTER (OUTPATIENT)
Dept: INTERVENTIONAL RADIOLOGY/VASCULAR | Age: 80
Discharge: HOME | End: 2025-02-20
Payer: COMMERCIAL

## 2025-02-20 VITALS
BODY MASS INDEX: 37.13 KG/M2 | RESPIRATION RATE: 16 BRPM | HEART RATE: 89 BPM | DIASTOLIC BLOOD PRESSURE: 78 MMHG | SYSTOLIC BLOOD PRESSURE: 157 MMHG | HEIGHT: 68 IN | WEIGHT: 245 LBS | OXYGEN SATURATION: 100 %

## 2025-02-20 DIAGNOSIS — K72.90 DECOMPENSATION OF CIRRHOSIS OF LIVER (HCC): ICD-10-CM

## 2025-02-20 DIAGNOSIS — R18.8 CIRRHOSIS OF LIVER WITH ASCITES, UNSPECIFIED HEPATIC CIRRHOSIS TYPE (HCC): ICD-10-CM

## 2025-02-20 DIAGNOSIS — K74.60 CIRRHOSIS OF LIVER WITH ASCITES, UNSPECIFIED HEPATIC CIRRHOSIS TYPE (HCC): ICD-10-CM

## 2025-02-20 DIAGNOSIS — K74.60 DECOMPENSATION OF CIRRHOSIS OF LIVER (HCC): ICD-10-CM

## 2025-02-20 LAB
APPEARANCE FLD: NORMAL
BLASTS NFR FLD: 0 %
BODY FLD TYPE: NORMAL
CLOT CHECK: NORMAL
COLOR FLD: YELLOW
EOSINOPHIL NFR FLD: 0 %
ERYTHROCYTE [DISTWIDTH] IN BLOOD BY AUTOMATED COUNT: 17.9 % (ref 11.7–14.9)
HCT VFR BLD AUTO: 26.2 % (ref 42–52)
HGB BLD-MCNC: 7.4 G/DL (ref 13.5–18)
LYMPHOCYTES NFR FLD: 21 %
MCH RBC QN AUTO: 24.8 PG (ref 27–31)
MCHC RBC AUTO-ENTMCNC: 28.2 G/DL (ref 32–36)
MCV RBC AUTO: 87.9 FL (ref 78–100)
MESOTHELIAL CELLS BODY FLUID: 14 %
MONOCYTES NFR FLD: 78 %
NEUTROPHILS NFR FLD: 1 %
PLATELET # BLD AUTO: 246 K/UL (ref 140–440)
PMV BLD AUTO: 9.6 FL (ref 7.5–11.1)
RBC # BLD AUTO: 2.98 M/UL (ref 4.6–6.2)
RBC # FLD: <2000 CELLS/UL
UNIDENT CELLS NFR FLD: 0 %
WBC # FLD: 118 CELLS/UL
WBC OTHER # BLD: 5.4 K/UL (ref 4–10.5)

## 2025-02-20 PROCEDURE — P9047 ALBUMIN (HUMAN), 25%, 50ML: HCPCS | Performed by: STUDENT IN AN ORGANIZED HEALTH CARE EDUCATION/TRAINING PROGRAM

## 2025-02-20 PROCEDURE — 85027 COMPLETE CBC AUTOMATED: CPT

## 2025-02-20 PROCEDURE — 2709999900 IR US GUIDED PARACENTESIS

## 2025-02-20 PROCEDURE — 89051 BODY FLUID CELL COUNT: CPT

## 2025-02-20 PROCEDURE — P9047 ALBUMIN (HUMAN), 25%, 50ML: HCPCS

## 2025-02-20 PROCEDURE — 6360000002 HC RX W HCPCS

## 2025-02-20 PROCEDURE — 6360000002 HC RX W HCPCS: Performed by: STUDENT IN AN ORGANIZED HEALTH CARE EDUCATION/TRAINING PROGRAM

## 2025-02-20 PROCEDURE — 49083 ABD PARACENTESIS W/IMAGING: CPT

## 2025-02-20 RX ORDER — LIDOCAINE HYDROCHLORIDE 10 MG/ML
INJECTION, SOLUTION EPIDURAL; INFILTRATION; INTRACAUDAL; PERINEURAL PRN
Status: COMPLETED | OUTPATIENT
Start: 2025-02-20 | End: 2025-02-20

## 2025-02-20 RX ORDER — ALBUMIN (HUMAN) 12.5 G/50ML
SOLUTION INTRAVENOUS CONTINUOUS PRN
Status: COMPLETED | OUTPATIENT
Start: 2025-02-20 | End: 2025-02-20

## 2025-02-20 RX ADMIN — ALBUMIN (HUMAN) 37.5 G: 0.25 INJECTION, SOLUTION INTRAVENOUS at 11:21

## 2025-02-20 RX ADMIN — LIDOCAINE HYDROCHLORIDE 6 ML: 10 INJECTION, SOLUTION EPIDURAL; INFILTRATION; INTRACAUDAL; PERINEURAL at 11:03

## 2025-02-20 ASSESSMENT — PAIN - FUNCTIONAL ASSESSMENT: PAIN_FUNCTIONAL_ASSESSMENT: NONE - DENIES PAIN

## 2025-02-20 NOTE — PROGRESS NOTES
TRANSFER - OUT REPORT:    Verbal report given to JANNETH Fisher on Boby Glasgow being transferred to IR holding for routine post-op       Report consisted of patient's Situation, Background, Assessment and   Recommendations(SBAR).     Information from the following report(s) Nurse Handoff Report was reviewed with the receiving nurse.    Opportunity for questions and clarification was provided.      Patient transported with:   Registered Nurse

## 2025-02-20 NOTE — PRE SEDATION
Pre-Procedure Note    Patient Name: Boby Glasgow   YOB: 1945  Room/Bed: Room/bed info not found  Medical Record Number: [id number]  Date: 2/20/2025   Time: 6:39 PM       Indication:  Ascites    Consent: I have discussed with the patient and/or the patient representative the indication, alternatives, and the possible risks and/or complications of the planned procedure and the anesthesia methods. The patient and/or patient representative appear to understand and agree to proceed.    Vital Signs:   Vitals:    02/20/25 1118   BP: (!) 157/78   Pulse: 89   Resp: 16   SpO2: 100%       Past Medical History:   has a past medical history of Acute bacterial endocarditis, Anxiety, Chronic heart failure, unspecified heart failure type (HCC), Chronic heart failure, unspecified heart failure type (HCC), Cirrhosis, alcoholic (HCC), Diabetes mellitus (HCC), GERD (gastroesophageal reflux disease), GERD (gastroesophageal reflux disease), Hyperlipidemia, Hypertension, Meniere's disease, Portal hypertension (HCC), Pulmonary nodules, Secondary esophageal varices without bleeding (HCC), Sleep apnea, SOB (shortness of breath), and Thyroid disease.    Past Surgical History:   has a past surgical history that includes Cholecystectomy; Vasectomy; Colonoscopy; Endoscopy, colon, diagnostic; Foot surgery; Upper gastrointestinal endoscopy (N/A, 1/19/2022); Tonsillectomy; and Appendectomy.    Medications:   Scheduled Meds:   Continuous Infusions:   PRN Meds:   Home Meds:   Prior to Admission medications    Medication Sig Start Date End Date Taking? Authorizing Provider   albuterol sulfate HFA (PROVENTIL;VENTOLIN;PROAIR) 108 (90 Base) MCG/ACT inhaler INHALE 2 PUFFS BY MOUTH 4 TIMES DAILY AS NEEDED FOR WHEEZING 2/4/25  Yes Karina Dao PA-C   levothyroxine (SYNTHROID) 50 MCG tablet Take 1 tablet by mouth once daily 1/20/25  Yes Paul Baeza MD   furosemide (LASIX) 40 MG tablet Take 1 tablet by mouth once daily

## 2025-02-24 RX ORDER — DULOXETIN HYDROCHLORIDE 60 MG/1
60 CAPSULE, DELAYED RELEASE ORAL DAILY
Qty: 90 CAPSULE | Refills: 0 | OUTPATIENT
Start: 2025-02-24

## 2025-02-27 ENCOUNTER — HOSPITAL ENCOUNTER (OUTPATIENT)
Dept: INTERVENTIONAL RADIOLOGY/VASCULAR | Age: 80
Discharge: HOME | End: 2025-02-27
Payer: COMMERCIAL

## 2025-02-27 VITALS
TEMPERATURE: 98.1 F | RESPIRATION RATE: 18 BRPM | DIASTOLIC BLOOD PRESSURE: 69 MMHG | SYSTOLIC BLOOD PRESSURE: 145 MMHG | OXYGEN SATURATION: 95 % | HEART RATE: 87 BPM

## 2025-02-27 DIAGNOSIS — K72.90 DECOMPENSATION OF CIRRHOSIS OF LIVER (HCC): ICD-10-CM

## 2025-02-27 DIAGNOSIS — K74.60 CIRRHOSIS OF LIVER WITH ASCITES, UNSPECIFIED HEPATIC CIRRHOSIS TYPE (HCC): ICD-10-CM

## 2025-02-27 DIAGNOSIS — R18.8 CIRRHOSIS OF LIVER WITH ASCITES, UNSPECIFIED HEPATIC CIRRHOSIS TYPE (HCC): ICD-10-CM

## 2025-02-27 DIAGNOSIS — K74.60 DECOMPENSATION OF CIRRHOSIS OF LIVER (HCC): ICD-10-CM

## 2025-02-27 LAB
APPEARANCE FLD: NORMAL
BLASTS NFR FLD: 0 %
BODY FLD TYPE: NORMAL
CLOT CHECK: NORMAL
COLOR FLD: YELLOW
EOSINOPHIL NFR FLD: 0 %
LYMPHOCYTES NFR FLD: 23 %
MESOTHELIAL CELLS BODY FLUID: 5 %
MONOCYTES NFR FLD: 74 %
NEUTROPHILS NFR FLD: 3 %
RBC # FLD: <2000 CELLS/UL
UNIDENT CELLS NFR FLD: 0 %
WBC # FLD: 102 CELLS/UL

## 2025-02-27 PROCEDURE — 89051 BODY FLUID CELL COUNT: CPT

## 2025-02-27 PROCEDURE — 6360000002 HC RX W HCPCS: Performed by: INTERNAL MEDICINE

## 2025-02-27 PROCEDURE — P9047 ALBUMIN (HUMAN), 25%, 50ML: HCPCS | Performed by: INTERNAL MEDICINE

## 2025-02-27 PROCEDURE — 6360000002 HC RX W HCPCS: Performed by: RADIOLOGY

## 2025-02-27 PROCEDURE — 6360000002 HC RX W HCPCS

## 2025-02-27 PROCEDURE — P9047 ALBUMIN (HUMAN), 25%, 50ML: HCPCS

## 2025-02-27 PROCEDURE — 2709999900 IR US GUIDED PARACENTESIS

## 2025-02-27 PROCEDURE — 49083 ABD PARACENTESIS W/IMAGING: CPT

## 2025-02-27 RX ORDER — ALBUMIN (HUMAN) 12.5 G/50ML
SOLUTION INTRAVENOUS CONTINUOUS PRN
Status: COMPLETED | OUTPATIENT
Start: 2025-02-27 | End: 2025-02-27

## 2025-02-27 RX ORDER — LIDOCAINE HYDROCHLORIDE 10 MG/ML
INJECTION, SOLUTION EPIDURAL; INFILTRATION; INTRACAUDAL; PERINEURAL PRN
Status: COMPLETED | OUTPATIENT
Start: 2025-02-27 | End: 2025-02-27

## 2025-02-27 RX ADMIN — LIDOCAINE HYDROCHLORIDE 6 MG: 10 INJECTION, SOLUTION EPIDURAL; INFILTRATION; INTRACAUDAL; PERINEURAL at 10:55

## 2025-02-27 RX ADMIN — ALBUMIN (HUMAN) 50 G: 0.25 INJECTION, SOLUTION INTRAVENOUS at 11:14

## 2025-02-27 NOTE — CONSULTS
Consult Interventional Radiology    Date:2025  Name:Boby Glasgow   :1945   MR#:[id number]    SEX:male     Planned procedure:  paracentesis  Indication: ascites    H&P Status: H&P was reviewed, the patient was examined and no change has occurred in patient's condition since H&P was completed.    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  Initially treated with vancomycin but had a supratherapeutic level, hence, it was substituted by daptomycin.  Cumulative 6-week course of therapy will be completed on 3/13/2023.    Anxiety     Chronic heart failure, unspecified heart failure type (HCC) 2023    Chronic heart failure, unspecified heart failure type (HCC) 2023    Cirrhosis, alcoholic (HCC)     Diabetes mellitus (HCC)     GERD (gastroesophageal reflux disease)     GERD (gastroesophageal reflux disease)     Hyperlipidemia     Hypertension     Meniere's disease     Portal hypertension (HCC)     Pulmonary nodules     Secondary esophageal varices without bleeding (HCC)     Sleep apnea     SOB (shortness of breath)     Thyroid disease         Past Surgical History:  Past Surgical History:   Procedure Laterality Date    APPENDECTOMY      CHOLECYSTECTOMY      COLONOSCOPY      ENDOSCOPY, COLON, DIAGNOSTIC      FOOT SURGERY      needle removed,not sure which foot, per wife.    TONSILLECTOMY      UPPER GASTROINTESTINAL ENDOSCOPY N/A 2022    EGD BIOPSY performed by Travis Phelan MD at [facility] ENDOSCOPY    VASECTOMY         Social History:  Social History     Socioeconomic History    Marital status:      Spouse name: Not on file    Number of children: Not on file    Years of education: Not on file    Highest education level: Not on file   Occupational History     Comment: Retired    Tobacco Use    Smoking status: Former     Current packs/day: 0.00

## 2025-02-27 NOTE — BRIEF OP NOTE
Department of Interventional Radiology Post-Procedure Note      Date: 2/27/2025     Interventional Radiologist: Constantine    Procedure Performed:  paracentesis    H&P Status: H&P was reviewed, the patient was examined and no change has occurred in patient's condition since H&P was completed.    Pre-procedure Diagnosis:  ascites    Post-procedure Diagnosis:  Same    Sedation:  none    Contrast used:  none    Estimated blood loss:  Minimal    Preliminary Findings:  Successful    Complications:  none    Full Report to Follow.    Electronically signed by Jennifer Fitch MD on 2/27/2025 at 3:26 PM

## 2025-02-27 NOTE — PROGRESS NOTES
TRANSFER - OUT REPORT:    Verbal report given to Lainey RN and Danae RN on Boby Glasgow being transferred to IR holding for routine post-op       Report consisted of patient's Situation, Background, Assessment and   Recommendations(SBAR).     Information from the following report(s) Nurse Handoff Report was reviewed with the receiving nurse.    Opportunity for questions and clarification was provided.      Patient transported with:   Registered Nurse    Successful paracentesis performed. 8750cc of milky yellow fluid was drained. 1000cc was sent to the lab. 50g of albumin was given

## 2025-02-27 NOTE — PROGRESS NOTES
Returned to IR holding after procedure. Tolerated procedure well. Dressing is clean, dry, intact. Preparing for discharge soon.

## 2025-02-28 ENCOUNTER — OFFICE VISIT (OUTPATIENT)
Dept: GASTROENTEROLOGY | Age: 80
End: 2025-02-28
Payer: COMMERCIAL

## 2025-02-28 ENCOUNTER — PREP FOR PROCEDURE (OUTPATIENT)
Dept: GASTROENTEROLOGY | Age: 80
End: 2025-02-28

## 2025-02-28 VITALS
HEART RATE: 88 BPM | HEIGHT: 68 IN | RESPIRATION RATE: 16 BRPM | OXYGEN SATURATION: 95 % | BODY MASS INDEX: 38.31 KG/M2 | WEIGHT: 252.8 LBS | SYSTOLIC BLOOD PRESSURE: 140 MMHG | DIASTOLIC BLOOD PRESSURE: 72 MMHG

## 2025-02-28 DIAGNOSIS — K70.31 ALCOHOLIC CIRRHOSIS OF LIVER WITH ASCITES (HCC): ICD-10-CM

## 2025-02-28 DIAGNOSIS — D64.9 NORMOCYTIC ANEMIA: ICD-10-CM

## 2025-02-28 DIAGNOSIS — D12.6 TUBULAR ADENOMA OF COLON: ICD-10-CM

## 2025-02-28 DIAGNOSIS — K72.90 DECOMPENSATED CIRRHOSIS (HCC): ICD-10-CM

## 2025-02-28 DIAGNOSIS — K74.60 DECOMPENSATED CIRRHOSIS (HCC): ICD-10-CM

## 2025-02-28 DIAGNOSIS — K74.60 DECOMPENSATED CIRRHOSIS (HCC): Primary | ICD-10-CM

## 2025-02-28 DIAGNOSIS — K76.82 HEPATIC ENCEPHALOPATHY (HCC): ICD-10-CM

## 2025-02-28 DIAGNOSIS — K72.90 DECOMPENSATED CIRRHOSIS (HCC): Primary | ICD-10-CM

## 2025-02-28 PROCEDURE — 1159F MED LIST DOCD IN RCRD: CPT | Performed by: INTERNAL MEDICINE

## 2025-02-28 PROCEDURE — 1123F ACP DISCUSS/DSCN MKR DOCD: CPT | Performed by: INTERNAL MEDICINE

## 2025-02-28 PROCEDURE — 3077F SYST BP >= 140 MM HG: CPT | Performed by: INTERNAL MEDICINE

## 2025-02-28 PROCEDURE — G2211 COMPLEX E/M VISIT ADD ON: HCPCS | Performed by: INTERNAL MEDICINE

## 2025-02-28 PROCEDURE — 3078F DIAST BP <80 MM HG: CPT | Performed by: INTERNAL MEDICINE

## 2025-02-28 PROCEDURE — 99215 OFFICE O/P EST HI 40 MIN: CPT | Performed by: INTERNAL MEDICINE

## 2025-02-28 RX ORDER — CARVEDILOL 3.12 MG/1
3.12 TABLET ORAL 2 TIMES DAILY
Qty: 60 TABLET | Refills: 3 | Status: SHIPPED | OUTPATIENT
Start: 2025-02-28

## 2025-02-28 NOTE — PROGRESS NOTES
orders  -     carvedilol (COREG) 3.125 MG tablet; Take 1 tablet by mouth 2 times daily       Diagnosis Orders   1. Decompensated cirrhosis (HCC)  Protime-INR    Comprehensive Metabolic Panel    CBC    US ABDOMEN LIMITED    EGD Routine    Iron and TIBC    Ferritin      2. Normocytic anemia        3. Tubular adenoma of colon  COLONOSCOPY W/ OR W/O BIOPSY      4. Hepatic encephalopathy (HCC)        5. Alcoholic cirrhosis of liver with ascites (HCC)  Protime-INR    Comprehensive Metabolic Panel    CBC    US ABDOMEN LIMITED    EGD Routine    Iron and TIBC    Ferritin        Orders Placed This Encounter   Procedures    COLONOSCOPY W/ OR W/O BIOPSY     Standing Status:   Future     Standing Expiration Date:   8/28/2025     Order Specific Question:   Screening or Diagnostic?     Answer:   Diagnostic    US ABDOMEN LIMITED     Standing Status:   Future     Standing Expiration Date:   2/28/2026     Order Specific Question:   Reason for exam:     Answer:   CIRRHOSIS-SURVEILLANCE FOR HEPATOMA    Protime-INR     Standing Status:   Future     Standing Expiration Date:   2/28/2026     Order Specific Question:   Daily Coumadin Dose?     Answer:   NA    Comprehensive Metabolic Panel     Standing Status:   Future     Standing Expiration Date:   2/28/2026    CBC     Standing Status:   Future     Standing Expiration Date:   2/28/2026    Iron and TIBC     Standing Status:   Future     Standing Expiration Date:   2/28/2026    Ferritin     Standing Status:   Future     Standing Expiration Date:   2/28/2026   #1 Decompensated Cirrhosis   # Cirrhosis - decompensated  --has varices, encephalopathy as well as ascites  - MELD score = no recent MELD labs will order MELD labs  - 2/2 thought to be secondary to EtOH  - Ascites: Patient is needing weekly paracentesis.  He has been refractory to maximal diuretic therapy currently is on Lasix 40 mg, Aldactone 100 mg twice daily  - SBP pxs: No  - Hepatic encephalopathy: Has significant encephalopathy

## 2025-02-28 NOTE — PROGRESS NOTES
Hospitalist Progress Note      Name:  Salvador Perez /Age/Sex: 1945  (78 y.o. male)   MRN & CSN:  [id number] & [id number] Admission Date/Time: 2022 12:00 PM   Location:  -A PCP: Anita Garrett, [address] Day: 12    Assessment and Plan:   Salvador Perez is a 78 y.o.  male  who presents with Pleural effusion    1) Acute Hypoxic Respiratory Failure  -Likely due to large Rt sided pleural effusion as well as Rhinovirus  -Mechanical intubation on 2022, extubated on 2022  -S/P Rt thoracentesis with removal of 2L of fluid 22  -S/P right thoracocentesis with removal of 1.4 L of fluid on 2022  -Per Pulm, fluid seems to be due to ascites  -Pulm on board, might need pleurodesis if recurrent    2) Decompensated Alcoholic liver cirrhosis with ascites  -S/P Paracentesis with drainage of 6.9L on , analysis consistent with portal HTN due to liver cirrhosis. Neg for SBP.  -Continue lasix and Aldactone  -GI on board, might need TIPS procedure if Rt sided pleural effusion continues and due to hepatic hydothorax  -Continue to monitor    3) Acute Hepatic Encephalopathy  -Continue Lactulose and Rifaximin  -Aim to achieve 2-3 BM daily  -Mentation improving    4) Hypotension  -Likely due to third spacing  -Improved, Weaned off pressor     Other chronic medical conditions: Medication resumed unless contraindicated    CKD stage III  History of hypothyroidism  Morbid obesity      Diet ADULT DIET; Dysphagia - Soft and Bite Sized; Mildly Thick (Nectar)   DVT Prophylaxis [] Lovenox, []  Heparin, [] SCDs, [] Ambulation   GI Prophylaxis [] PPI,  [] H2 Blocker,  [] Carafate,  [] Diet/Tube Feeds   Code Status Full Code   Disposition TBD   MDM      History of Present Illness:     Patient was seen and examined  Mentation improving  Denied any chest pain, SOB  No acute event overnight      Objective:        Intake/Output Summary (Last 24 Provided verbal communication on fetal movement counts, labor precautions, and signs/symptoms of pre-eclampsia.  Patient verbalized understanding of the information shared.  All patient questions were addressed.    hours) at 5/13/2022 1030  Last data filed at 5/13/2022 0900  Gross per 24 hour   Intake 450 ml   Output 1550 ml   Net -1100 ml      Vitals:   Vitals:    05/13/22 1000   BP: (!) 150/111   Pulse: 117   Resp: (!) 35   Temp:    SpO2: 95%     Physical Exam:   GEN Awake and alert   EYES Pupils are equally round. No scleral erythema, discharge, or conjunctivitis. HENT Mucous membranes are moist. Oral pharynx without exudates, no evidence of thrush. NECK Supple, no apparent thyromegaly or masses. RESP Clear to auscultation, no wheezes, rales or rhonchi. Symmetric chest movement while on 2L of O2  CARDIO/VASC S1/S2 auscultated. Regular rate without appreciable murmurs, rubs, or gallops. No JVD or carotid bruits. Peripheral pulses equal bilaterally and palpable. No peripheral edema. GI Abdomen is soft without significant tenderness, masses, or guarding. Bowel sounds are normoactive. Rectal exam deferred.  No costovertebral angle tenderness. Schilling catheter is present. HEME/LYMPH No palpable cervical lymphadenopathy and no hepatosplenomegaly. No petechiae or ecchymoses. MSK No gross joint deformities. SKIN Normal coloration, warm, dry.   NEURO No focal deficit, delirious    Medications:   Medications:    furosemide  40 mg IntraVENous BID    sodium chloride  1 g Oral TID    insulin regular  0-12 Units SubCUTAneous Q6H    enoxaparin  40 mg SubCUTAneous Daily    lidocaine PF  5 mL IntraDERmal Once    sodium chloride flush  5-40 mL IntraVENous 2 times per day    DULoxetine  60 mg Oral Daily    lactulose  20 g Oral TID    levothyroxine  50 mcg Oral Daily    magnesium oxide  200 mg Oral BID    [Held by provider] nadolol  40 mg Oral Daily    rifAXIMin  550 mg Oral BID    spironolactone  100 mg Oral Daily      Infusions:    sodium chloride Stopped (05/06/22 1431)    norepinephrine Stopped (05/11/22 1030)    sodium chloride Stopped (05/10/22 0144)    sodium chloride       PRN Meds: potassium chloride, 20 mEq, PRN   Or  potassium chloride, 10 mEq, PRN  fentaNYL, 25 mcg, Q2H PRN  magnesium sulfate, 1,000 mg, PRN  sodium chloride flush, 5-40 mL, PRN  sodium chloride, , PRN  sodium phosphate IVPB, 15 mmol, PRN   Or  sodium phosphate IVPB, 15 mmol, PRN  sodium chloride flush, 5-40 mL, PRN  sodium chloride, , PRN  albuterol sulfate HFA, 2 puff, Q4H PRN  sodium chloride flush, 10 mL, PRN  sodium chloride, , PRN  nicotine polacrilex, 2 mg, Q1H PRN  acetaminophen, 650 mg, Q6H PRN   Or  acetaminophen, 650 mg, Q6H PRN  ondansetron, 4 mg, Q6H PRN  ALPRAZolam, 0.5 mg, Daily PRN          Electronically signed by Jen Coughlin MD on 5/13/2022 at 10:30 AM

## 2025-03-01 RX ORDER — LACTULOSE 10 G/15ML
SOLUTION ORAL
Qty: 270 ML | Refills: 11 | Status: SHIPPED | OUTPATIENT
Start: 2025-03-01

## 2025-03-03 ENCOUNTER — TELEPHONE (OUTPATIENT)
Dept: GASTROENTEROLOGY | Age: 80
End: 2025-03-03

## 2025-03-03 NOTE — PROGRESS NOTES
IR Procedure at [facility]:  3/6/25 arrival at 1300, procedure at 1430           3/13/25 arrival 1000, procedure at 1130    NPO at Midnight      Follow your directions as prescribed by the doctor for your procedure and medications.  3.   Consult your provider as to when to stop blood thinner  4.   Do not take any pain medication within 6 hours of your procedure  5.   Do not drink any alcoholic beverages or use any street drugs 24 hours before procedure.  6.   Please wear simple, loose fitting clothing to the hospital.  Do not bring valuables (money,             credit cards, checkbooks, etc.)     7.   If you  have a Living Will and Durable Power of  for Healthcare, please bring in a copy.  8.   Please bring picture ID,  insurance card, paperwork from the doctors office            (H & P, Consent,  & card for implantable devices).  9.   Report to the information desk on the ground floor.  10. Take a shower the night before or morning of your procedure, do not apply any lotion, oil or powder.  11. You will need a responsible adult

## 2025-03-04 RX ORDER — SODIUM CHLORIDE, SODIUM LACTATE, POTASSIUM CHLORIDE, CALCIUM CHLORIDE 600; 310; 30; 20 MG/100ML; MG/100ML; MG/100ML; MG/100ML
INJECTION, SOLUTION INTRAVENOUS CONTINUOUS
Status: CANCELLED | OUTPATIENT
Start: 2025-03-04

## 2025-03-04 RX ORDER — SODIUM CHLORIDE 0.9 % (FLUSH) 0.9 %
5-40 SYRINGE (ML) INJECTION EVERY 12 HOURS SCHEDULED
Status: CANCELLED | OUTPATIENT
Start: 2025-03-04

## 2025-03-04 RX ORDER — SODIUM CHLORIDE 9 MG/ML
INJECTION, SOLUTION INTRAVENOUS PRN
Status: CANCELLED | OUTPATIENT
Start: 2025-03-04

## 2025-03-04 RX ORDER — SODIUM CHLORIDE 0.9 % (FLUSH) 0.9 %
5-40 SYRINGE (ML) INJECTION PRN
Status: CANCELLED | OUTPATIENT
Start: 2025-03-04

## 2025-03-04 NOTE — PROGRESS NOTES
SPOKE WITH FRANCIS IN SCHEDULING , PATIENT WAS RESCHEDULED TO TOMORROW 3/5/2025 AT 1100, ARRIVAL TIME AT 1000 BY IR

## 2025-03-04 NOTE — PROGRESS NOTES
IR Procedure at [facility]:  Spoke with patient's wife Gaby and patient will arrive at 1330  at [facility] on 3/6/2025 for his procedure at 1430. Patient has been given below instructions and will take his medications as scheduled.      Follow your directions as prescribed by the doctor for your procedure and medications.  3.   Consult your provider as to when to stop blood thinner  4.   Do not take any pain medication within 6 hours of your procedure  5.   Do not drink any alcoholic beverages or use any street drugs 24 hours before procedure.  6.   Please wear simple, loose fitting clothing to the hospital.  Do not bring valuables (money,             credit cards, checkbooks, etc.)     7.   If you  have a Living Will and Durable Power of  for Healthcare, please bring in a copy.  8.   Please bring picture ID,  insurance card, paperwork from the doctors office            (H & P, Consent,  & card for implantable devices).  9.   Report to the information desk on the ground floor.  10. Take a shower the night before or morning of your procedure, do not apply any lotion, oil or powder.  11. You will need a responsible adult

## 2025-03-05 ENCOUNTER — HOSPITAL ENCOUNTER (OUTPATIENT)
Dept: INTERVENTIONAL RADIOLOGY/VASCULAR | Age: 80
Discharge: HOME | End: 2025-03-05
Payer: COMMERCIAL

## 2025-03-05 VITALS
OXYGEN SATURATION: 98 % | RESPIRATION RATE: 17 BRPM | HEART RATE: 78 BPM | SYSTOLIC BLOOD PRESSURE: 147 MMHG | DIASTOLIC BLOOD PRESSURE: 68 MMHG | TEMPERATURE: 98.3 F

## 2025-03-05 DIAGNOSIS — K74.60 CIRRHOSIS OF LIVER WITH ASCITES, UNSPECIFIED HEPATIC CIRRHOSIS TYPE (HCC): ICD-10-CM

## 2025-03-05 DIAGNOSIS — R18.8 CIRRHOSIS OF LIVER WITH ASCITES, UNSPECIFIED HEPATIC CIRRHOSIS TYPE (HCC): ICD-10-CM

## 2025-03-05 LAB
APPEARANCE FLD: CLEAR
BLASTS NFR FLD: 0 %
BODY FLD TYPE: NORMAL
CLOT CHECK: NORMAL
COLOR FLD: YELLOW
EOSINOPHIL NFR FLD: 0 %
LYMPHOCYTES NFR FLD: 4 %
MESOTHELIAL CELLS BODY FLUID: 6 %
MONOCYTES NFR FLD: 86 %
NEUTROPHILS NFR FLD: 10 %
RBC # FLD: <2000 CELLS/UL
UNIDENT CELLS NFR FLD: 0 %
WBC # FLD: 111 CELLS/UL

## 2025-03-05 PROCEDURE — 49083 ABD PARACENTESIS W/IMAGING: CPT

## 2025-03-05 PROCEDURE — P9047 ALBUMIN (HUMAN), 25%, 50ML: HCPCS

## 2025-03-05 PROCEDURE — 49083 ABD PARACENTESIS W/IMAGING: CPT | Performed by: INTERNAL MEDICINE

## 2025-03-05 PROCEDURE — 6360000002 HC RX W HCPCS

## 2025-03-05 PROCEDURE — 6360000002 HC RX W HCPCS: Performed by: INTERNAL MEDICINE

## 2025-03-05 PROCEDURE — 89051 BODY FLUID CELL COUNT: CPT

## 2025-03-05 RX ORDER — LIDOCAINE HYDROCHLORIDE 10 MG/ML
INJECTION, SOLUTION EPIDURAL; INFILTRATION; INTRACAUDAL; PERINEURAL PRN
Status: COMPLETED | OUTPATIENT
Start: 2025-03-05 | End: 2025-03-05

## 2025-03-05 RX ORDER — ALBUMIN (HUMAN) 12.5 G/50ML
50 SOLUTION INTRAVENOUS ONCE
Status: COMPLETED | OUTPATIENT
Start: 2025-03-05 | End: 2025-03-05

## 2025-03-05 RX ADMIN — LIDOCAINE HYDROCHLORIDE 10 ML: 10 INJECTION, SOLUTION EPIDURAL; INFILTRATION; INTRACAUDAL; PERINEURAL at 11:28

## 2025-03-05 RX ADMIN — ALBUMIN (HUMAN) 50 G: 12.5 SOLUTION INTRAVENOUS at 12:20

## 2025-03-05 NOTE — PROGRESS NOTES
Pt. returned to IR holding after paracentesis with Dr. Mera. Tolerated well. Some OJ and crackers given to pt. Albumin given per protocol. Preparing for discharge soon.

## 2025-03-05 NOTE — PROGRESS NOTES
TRANSFER - OUT REPORT:    Verbal report given to Lainey LAGUNAS on Boby Glasgow being transferred to IR [facility] for routine progression of patient care       Report consisted of patient's Situation, Background, Assessment and   Recommendations(SBAR).     Information from the following report(s) Nurse Handoff Report was reviewed with the receiving nurse.    Opportunity for questions and clarification was provided.      Patient transported with:   Registered Nurse

## 2025-03-05 NOTE — PROCEDURES
PROCEDURE NOTE  Date: 3/5/2025   Name: Boby Glasgow  YOB: 1945    Paracentesis    Date/Time: 3/5/2025 11:30 AM    Performed by: Hernesto Mera MD  Authorized by: Hernesto Mera MD  Consent: Verbal consent obtained. Written consent obtained.  Risks and benefits: risks, benefits and alternatives were discussed  Consent given by: patient  Patient understanding: patient states understanding of the procedure being performed  Patient consent: the patient's understanding of the procedure matches consent given  Procedure consent: procedure consent matches procedure scheduled  Relevant documents: relevant documents present and verified  Test results: test results available and properly labeled  Site marked: the operative site was marked  Imaging studies: imaging studies available  Patient identity confirmed: verbally with patient and arm band  Time out: Immediately prior to procedure a \"time out\" was called to verify the correct patient, procedure, equipment, support staff and site/side marked as required.  Initial or subsequent exam: initial  Procedure purpose: diagnostic and therapeutic  Indications: abdominal discomfort secondary to ascites  Anesthesia: local infiltration    Anesthesia:  Local Anesthetic: lidocaine 1% without epinephrine  Anesthetic total: 10 mL    Sedation:  Patient sedated: no    Preparation: Patient was prepped and draped in the usual sterile fashion.  Needle gauge: 18  Ultrasound guidance: yes  Puncture site: right lower quadrant  Fluid removed: 7950(ml)  Fluid appearance: chylous  Dressing: 4x4 sterile gauze  Patient tolerance: patient tolerated the procedure well with no immediate complications  Comments: 25% Albumin and send fluid for analysis

## 2025-03-05 NOTE — OR NURSING
Pt had a paracentesis performed by Dr Mera with a total of 7950 cc of cloudy yellow fluid removed and of that 1,000 cc of fluid sent to lab for testing

## 2025-03-07 RX ORDER — DULOXETIN HYDROCHLORIDE 60 MG/1
60 CAPSULE, DELAYED RELEASE ORAL DAILY
Qty: 90 CAPSULE | Refills: 0 | Status: SHIPPED | OUTPATIENT
Start: 2025-03-07

## 2025-03-12 ENCOUNTER — TELEPHONE (OUTPATIENT)
Dept: GASTROENTEROLOGY | Age: 80
End: 2025-03-12

## 2025-03-12 RX ORDER — CARVEDILOL 3.12 MG/1
3.12 TABLET ORAL 2 TIMES DAILY
Qty: 60 TABLET | Refills: 3 | Status: SHIPPED | OUTPATIENT
Start: 2025-03-12

## 2025-03-12 NOTE — PROGRESS NOTES
IR Procedure at [facility]:  Left message on patient's wife Gaby's phone for patient to arrive at 1030 at [facility] on 3/20/2025 for his procedure at 1130. Patient has been given below instructions and will take his medications as scheduled.      Follow your directions as prescribed by the doctor for your procedure and medications.  3.   Consult your provider as to when to stop blood thinner  4.   Do not take any pain medication within 6 hours of your procedure  5.   Do not drink any alcoholic beverages or use any street drugs 24 hours before procedure.  6.   Please wear simple, loose fitting clothing to the hospital.  Do not bring valuables (money,             credit cards, checkbooks, etc.)     7.   If you  have a Living Will and Durable Power of  for Healthcare, please bring in a copy.  8.   Please bring picture ID,  insurance card, paperwork from the doctors office            (H & P, Consent,  & card for implantable devices).  9.   Report to the information desk on the ground floor.  10. Take a shower the night before or morning of your procedure, do not apply any lotion, oil or powder.  11. You will need a responsible adult

## 2025-03-12 NOTE — TELEPHONE ENCOUNTER
Patients spouse called into get COREG sent to [facility] on [address], she said that it went to [facility] and they called her and said that they don't carry that medication.  Thank you

## 2025-03-13 ENCOUNTER — HOSPITAL ENCOUNTER (OUTPATIENT)
Dept: INTERVENTIONAL RADIOLOGY/VASCULAR | Age: 80
Discharge: HOME | End: 2025-03-13
Payer: COMMERCIAL

## 2025-03-13 VITALS
OXYGEN SATURATION: 99 % | SYSTOLIC BLOOD PRESSURE: 140 MMHG | DIASTOLIC BLOOD PRESSURE: 70 MMHG | TEMPERATURE: 98.3 F | HEART RATE: 90 BPM | RESPIRATION RATE: 15 BRPM

## 2025-03-13 DIAGNOSIS — K74.60 PROGRESSIVE NEURONAL DEGENERATION WITH LIVER CIRRHOSIS (HCC): ICD-10-CM

## 2025-03-13 DIAGNOSIS — G31.89 PROGRESSIVE NEURONAL DEGENERATION WITH LIVER CIRRHOSIS (HCC): ICD-10-CM

## 2025-03-13 LAB
ALBUMIN FLD-MCNC: 0.5 G/DL
AMYLASE FLD-CCNC: 14 U/L
APPEARANCE FLD: ABNORMAL
BLASTS NFR FLD: 0 %
BODY FLD TYPE: ABNORMAL
CLOT CHECK: ABNORMAL
COLOR FLD: YELLOW
EOSINOPHIL NFR FLD: 1 %
ERYTHROCYTE [DISTWIDTH] IN BLOOD BY AUTOMATED COUNT: 17.5 % (ref 11.7–14.9)
GLUCOSE FLD-MCNC: 117 MG/DL
HCT VFR BLD AUTO: 24.9 % (ref 42–52)
HGB BLD-MCNC: 7.4 G/DL (ref 13.5–18)
INR PPP: 1
LDH FLD L TO P-CCNC: 40 U/L
LYMPHOCYTES NFR FLD: 57 %
MCH RBC QN AUTO: 24.5 PG (ref 27–31)
MCHC RBC AUTO-ENTMCNC: 29.7 G/DL (ref 32–36)
MCV RBC AUTO: 82.5 FL (ref 78–100)
MESOTHELIAL CELLS BODY FLUID: 11 %
MONOCYTES NFR FLD: 20 %
NEUTROPHILS NFR FLD: 22 %
PARTIAL THROMBOPLASTIN TIME: 32 SEC (ref 25.1–37.1)
PLATELET # BLD AUTO: 214 K/UL (ref 140–440)
PMV BLD AUTO: 9.5 FL (ref 7.5–11.1)
PROT FLD-MCNC: 1.2 G/DL
PROTHROMBIN TIME: 13.8 SEC (ref 11.7–14.5)
RBC # BLD AUTO: 3.02 M/UL (ref 4.6–6.2)
RBC # FLD: <2000 CELLS/UL
SPECIMEN TYPE: NORMAL
UNIDENT CELLS NFR FLD: 0 %
WBC # FLD: 90 CELLS/UL
WBC OTHER # BLD: 5.7 K/UL (ref 4–10.5)

## 2025-03-13 PROCEDURE — 82945 GLUCOSE OTHER FLUID: CPT

## 2025-03-13 PROCEDURE — 85730 THROMBOPLASTIN TIME PARTIAL: CPT

## 2025-03-13 PROCEDURE — 82150 ASSAY OF AMYLASE: CPT

## 2025-03-13 PROCEDURE — 83615 LACTATE (LD) (LDH) ENZYME: CPT

## 2025-03-13 PROCEDURE — P9047 ALBUMIN (HUMAN), 25%, 50ML: HCPCS | Performed by: INTERNAL MEDICINE

## 2025-03-13 PROCEDURE — 49083 ABD PARACENTESIS W/IMAGING: CPT

## 2025-03-13 PROCEDURE — 6360000002 HC RX W HCPCS

## 2025-03-13 PROCEDURE — 87070 CULTURE OTHR SPECIMN AEROBIC: CPT

## 2025-03-13 PROCEDURE — 85610 PROTHROMBIN TIME: CPT

## 2025-03-13 PROCEDURE — 87205 SMEAR GRAM STAIN: CPT

## 2025-03-13 PROCEDURE — 84157 ASSAY OF PROTEIN OTHER: CPT

## 2025-03-13 PROCEDURE — 6360000002 HC RX W HCPCS: Performed by: RADIOLOGY

## 2025-03-13 PROCEDURE — 2709999900 IR US GUIDED PARACENTESIS

## 2025-03-13 PROCEDURE — 88108 CYTOPATH CONCENTRATE TECH: CPT

## 2025-03-13 PROCEDURE — P9047 ALBUMIN (HUMAN), 25%, 50ML: HCPCS

## 2025-03-13 PROCEDURE — 89051 BODY FLUID CELL COUNT: CPT

## 2025-03-13 PROCEDURE — 85027 COMPLETE CBC AUTOMATED: CPT

## 2025-03-13 PROCEDURE — 6360000002 HC RX W HCPCS: Performed by: INTERNAL MEDICINE

## 2025-03-13 PROCEDURE — 87075 CULTR BACTERIA EXCEPT BLOOD: CPT

## 2025-03-13 PROCEDURE — 82042 OTHER SOURCE ALBUMIN QUAN EA: CPT

## 2025-03-13 PROCEDURE — 88305 TISSUE EXAM BY PATHOLOGIST: CPT

## 2025-03-13 RX ORDER — LIDOCAINE HYDROCHLORIDE AND EPINEPHRINE BITARTRATE 20; .01 MG/ML; MG/ML
INJECTION, SOLUTION SUBCUTANEOUS PRN
Status: COMPLETED | OUTPATIENT
Start: 2025-03-13 | End: 2025-03-13

## 2025-03-13 RX ORDER — LIDOCAINE HYDROCHLORIDE 10 MG/ML
INJECTION, SOLUTION EPIDURAL; INFILTRATION; INTRACAUDAL; PERINEURAL PRN
Status: COMPLETED | OUTPATIENT
Start: 2025-03-13 | End: 2025-03-13

## 2025-03-13 RX ORDER — ALBUMIN (HUMAN) 12.5 G/50ML
SOLUTION INTRAVENOUS CONTINUOUS PRN
Status: COMPLETED | OUTPATIENT
Start: 2025-03-13 | End: 2025-03-13

## 2025-03-13 RX ADMIN — LIDOCAINE HYDROCHLORIDE,EPINEPHRINE BITARTRATE 7 ML: 20; .01 INJECTION, SOLUTION INFILTRATION; PERINEURAL at 11:48

## 2025-03-13 RX ADMIN — ALBUMIN (HUMAN) 50 G: 0.25 INJECTION, SOLUTION INTRAVENOUS at 12:21

## 2025-03-13 RX ADMIN — LIDOCAINE HYDROCHLORIDE 10 ML: 10 INJECTION, SOLUTION EPIDURAL; INFILTRATION; INTRACAUDAL; PERINEURAL at 11:48

## 2025-03-13 NOTE — PROGRESS NOTES
Received back to IR holding after paracentesis in IR. Tolerated well. Drink provided. ABD dressing is clean, dry, intact. Preparing for discharge soon.

## 2025-03-13 NOTE — PROGRESS NOTES
TRANSFER - OUT REPORT:    Verbal report given to Marj LAGUNAS on Boby Glasgow being transferred to IR [facility] for routine progression of patient care       Report consisted of patient's Situation, Background, Assessment and   Recommendations(SBAR).     Information from the following report(s) Nurse Handoff Report was reviewed with the receiving nurse.    Opportunity for questions and clarification was provided.      Patient transported with:   Registered Nurse

## 2025-03-13 NOTE — OR NURSING
Pt had a paracentesis performed by Dr Patel with a total of 7450cc of clody yellow fluid removed of that 1,000 cc of cloudy yellow fluid sent to lab

## 2025-03-14 ENCOUNTER — RESULTS FOLLOW-UP (OUTPATIENT)
Dept: INTERVENTIONAL RADIOLOGY/VASCULAR | Age: 80
End: 2025-03-14

## 2025-03-17 NOTE — PROGRESS NOTES
IR Procedure at [facility]: Left message on patient's wife Gaby's phone to have patient arrive at 1030 at  [facility] on 3/27/2025 for his procedure at  1130. Patient will take his medications as scheduled and has been given below instructions.      Follow your directions as prescribed by the doctor for your procedure and medications.  3.   Consult your provider as to when to stop blood thinner  4.   Do not take any pain medication within 6 hours of your procedure  5.   Do not drink any alcoholic beverages or use any street drugs 24 hours before procedure.  6.   Please wear simple, loose fitting clothing to the hospital.  Do not bring valuables (money,             credit cards, checkbooks, etc.)     7.   If you  have a Living Will and Durable Power of  for Healthcare, please bring in a copy.  8.   Please bring picture ID,  insurance card, paperwork from the doctors office            (H & P, Consent,  & card for implantable devices).  9.   Report to the information desk on the ground floor.  10. Take a shower the night before or morning of your procedure, do not apply any lotion, oil or powder.  11. You will need a responsible adult

## 2025-03-18 LAB — NON-GYN CYTOLOGY REPORT: NORMAL

## 2025-03-20 ENCOUNTER — HOSPITAL ENCOUNTER (OUTPATIENT)
Dept: INTERVENTIONAL RADIOLOGY/VASCULAR | Age: 80
Discharge: HOME | End: 2025-03-20
Payer: COMMERCIAL

## 2025-03-20 VITALS
OXYGEN SATURATION: 99 % | DIASTOLIC BLOOD PRESSURE: 96 MMHG | RESPIRATION RATE: 18 BRPM | TEMPERATURE: 98.2 F | HEART RATE: 74 BPM | SYSTOLIC BLOOD PRESSURE: 108 MMHG

## 2025-03-20 DIAGNOSIS — K76.9 CHRONIC LIVER DISEASE AND CIRRHOSIS (HCC): ICD-10-CM

## 2025-03-20 DIAGNOSIS — K74.60 CHRONIC LIVER DISEASE AND CIRRHOSIS (HCC): ICD-10-CM

## 2025-03-20 LAB
APPEARANCE FLD: NORMAL
BLASTS NFR FLD: 0 %
BODY FLD TYPE: NORMAL
CLOT CHECK: NORMAL
COLOR FLD: YELLOW
EOSINOPHIL NFR FLD: 0 %
LYMPHOCYTES NFR FLD: 16 %
MONOCYTES NFR FLD: 30 %
NEUTROPHILS NFR FLD: 54 %
RBC # FLD: <2000 CELLS/UL
UNIDENT CELLS NFR FLD: 0 %
WBC # FLD: 100 CELLS/UL

## 2025-03-20 PROCEDURE — P9047 ALBUMIN (HUMAN), 25%, 50ML: HCPCS

## 2025-03-20 PROCEDURE — P9047 ALBUMIN (HUMAN), 25%, 50ML: HCPCS | Performed by: RADIOLOGY

## 2025-03-20 PROCEDURE — 6360000002 HC RX W HCPCS

## 2025-03-20 PROCEDURE — 6360000002 HC RX W HCPCS: Performed by: RADIOLOGY

## 2025-03-20 PROCEDURE — 49083 ABD PARACENTESIS W/IMAGING: CPT

## 2025-03-20 PROCEDURE — 89051 BODY FLUID CELL COUNT: CPT

## 2025-03-20 PROCEDURE — 2709999900 IR US GUIDED PARACENTESIS

## 2025-03-20 RX ORDER — LIDOCAINE HYDROCHLORIDE 10 MG/ML
INJECTION, SOLUTION EPIDURAL; INFILTRATION; INTRACAUDAL; PERINEURAL PRN
Status: COMPLETED | OUTPATIENT
Start: 2025-03-20 | End: 2025-03-20

## 2025-03-20 RX ORDER — ALBUMIN (HUMAN) 12.5 G/50ML
SOLUTION INTRAVENOUS CONTINUOUS PRN
Status: COMPLETED | OUTPATIENT
Start: 2025-03-20 | End: 2025-03-20

## 2025-03-20 RX ADMIN — ALBUMIN (HUMAN) 50 G: 0.25 INJECTION, SOLUTION INTRAVENOUS at 14:16

## 2025-03-20 RX ADMIN — LIDOCAINE HYDROCHLORIDE 10 ML: 10 INJECTION, SOLUTION EPIDURAL; INFILTRATION; INTRACAUDAL; PERINEURAL at 13:40

## 2025-03-20 NOTE — PROGRESS NOTES
TRANSFER - OUT REPORT:    Verbal report given to Janelle LAGUNAS on Boby Glasgow being transferred to IR [facility] for routine progression of patient care       Report consisted of patient's Situation, Background, Assessment and   Recommendations(SBAR).     Information from the following report(s) Nurse Handoff Report was reviewed with the receiving nurse.    Opportunity for questions and clarification was provided.      Patient transported with:   Registered Nurse

## 2025-03-20 NOTE — BRIEF OP NOTE
Department of Interventional Radiology Post-Procedure Note      Date: 3/20/2025     Interventional Radiologist: Constantine    Procedure Performed:  paracentesis    H&P Status: H&P was reviewed, the patient was examined and no change has occurred in patient's condition since H&P was completed.    Pre-procedure Diagnosis:  Ascites    Post-procedure Diagnosis:  Same    Sedation:  none    Contrast used:  none    Estimated blood loss:  Minimal    Preliminary Findings:  Successful    Complications:  none    Full Report to Follow.    Electronically signed by Jennifer Fitch MD on 3/20/2025 at 3:02 PM

## 2025-03-20 NOTE — OR NURSING
Pt had a paracentesis performed by Dr Fitch with a total of 7500 cc of clear yellow fluid removed, of that 500 cc was sent to lab.

## 2025-03-20 NOTE — PROGRESS NOTES
Returned to IR holding after paracentesis. Tolerated well. Denies pain or needs. ABD dressing is clean, dry, intact. Preparing for discharge soon.

## 2025-03-20 NOTE — CONSULTS
Consult Interventional Radiology    Date:3/20/2025  Name:Boby Glasgow   :1945   MR#:[id number]    SEX:male     Planned procedure:  paracentesis  Indication: Ascites    H&P Status: H&P was reviewed, the patient was examined and no change has occurred in patient's condition since H&P was completed.    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  Initially treated with vancomycin but had a supratherapeutic level, hence, it was substituted by daptomycin.  Cumulative 6-week course of therapy will be completed on 3/13/2023.    Anxiety     Chronic heart failure, unspecified heart failure type (HCC) 2023    Chronic heart failure, unspecified heart failure type (HCC) 2023    Cirrhosis, alcoholic (HCC)     Diabetes mellitus (HCC)     GERD (gastroesophageal reflux disease)     GERD (gastroesophageal reflux disease)     Hyperlipidemia     Hypertension     Meniere's disease     Portal hypertension (HCC)     Pulmonary nodules     Secondary esophageal varices without bleeding (HCC)     Sleep apnea     SOB (shortness of breath)     Thyroid disease         Past Surgical History:  Past Surgical History:   Procedure Laterality Date    APPENDECTOMY      BACK SURGERY      CHOLECYSTECTOMY      COLONOSCOPY      ENDOSCOPY, COLON, DIAGNOSTIC      FOOT SURGERY      needle removed,not sure which foot, per wife.    TONSILLECTOMY      UPPER GASTROINTESTINAL ENDOSCOPY N/A 2022    EGD BIOPSY performed by Travis Phelan MD at [facility] ENDOSCOPY    VASECTOMY         Social History:  Social History     Socioeconomic History    Marital status:      Spouse name: Not on file    Number of children: Not on file    Years of education: Not on file    Highest education level: Not on file   Occupational History     Comment: Retired    Tobacco Use    Smoking status: Former     Current

## 2025-03-24 NOTE — PROGRESS NOTES
IR Procedure at [facility]:  Spoke with patient's wife Gaby and he will arrive at 1030 at [facility] on 4/3/2025 for his procedure at 1130. Patient has been given below instructions and will take his medications as scheduled.      Follow your directions as prescribed by the doctor for your procedure and medications.  3.   Consult your provider as to when to stop blood thinner  4.   Do not take any pain medication within 6 hours of your procedure  5.   Do not drink any alcoholic beverages or use any street drugs 24 hours before procedure.  6.   Please wear simple, loose fitting clothing to the hospital.  Do not bring valuables (money,             credit cards, checkbooks, etc.)     7.   If you  have a Living Will and Durable Power of  for Healthcare, please bring in a copy.  8.   Please bring picture ID,  insurance card, paperwork from the doctors office            (H & P, Consent,  & card for implantable devices).  9.   Report to the information desk on the ground floor.  10. Take a shower the night before or morning of your procedure, do not apply any lotion, oil or powder.  11. You will need a responsible adult

## 2025-03-27 ENCOUNTER — HOSPITAL ENCOUNTER (OUTPATIENT)
Dept: INTERVENTIONAL RADIOLOGY/VASCULAR | Age: 80
Discharge: HOME | End: 2025-03-27
Payer: COMMERCIAL

## 2025-03-27 VITALS
DIASTOLIC BLOOD PRESSURE: 67 MMHG | RESPIRATION RATE: 18 BRPM | SYSTOLIC BLOOD PRESSURE: 176 MMHG | HEART RATE: 95 BPM | OXYGEN SATURATION: 95 %

## 2025-03-27 DIAGNOSIS — K72.90 HEPATIC NECROSIS (HCC): ICD-10-CM

## 2025-03-27 LAB
APPEARANCE FLD: NORMAL
BLASTS NFR FLD: 0 %
BODY FLD TYPE: NORMAL
CLOT CHECK: NORMAL
COLOR FLD: YELLOW
EOSINOPHIL NFR FLD: 0 %
LYMPHOCYTES NFR FLD: 49 %
MONOCYTES NFR FLD: 43 %
NEUTROPHILS NFR FLD: 8 %
RBC # FLD: <2000 CELLS/UL
UNIDENT CELLS NFR FLD: 0 %
WBC # FLD: 112 CELLS/UL

## 2025-03-27 PROCEDURE — 89051 BODY FLUID CELL COUNT: CPT

## 2025-03-27 PROCEDURE — P9047 ALBUMIN (HUMAN), 25%, 50ML: HCPCS | Performed by: RADIOLOGY

## 2025-03-27 PROCEDURE — 49083 ABD PARACENTESIS W/IMAGING: CPT

## 2025-03-27 PROCEDURE — 2709999900 IR US GUIDED PARACENTESIS

## 2025-03-27 PROCEDURE — 6360000002 HC RX W HCPCS: Performed by: RADIOLOGY

## 2025-03-27 RX ORDER — ALBUMIN (HUMAN) 12.5 G/50ML
SOLUTION INTRAVENOUS CONTINUOUS PRN
Status: COMPLETED | OUTPATIENT
Start: 2025-03-27 | End: 2025-03-27

## 2025-03-27 RX ORDER — LIDOCAINE HYDROCHLORIDE 10 MG/ML
INJECTION, SOLUTION EPIDURAL; INFILTRATION; INTRACAUDAL; PERINEURAL PRN
Status: COMPLETED | OUTPATIENT
Start: 2025-03-27 | End: 2025-03-27

## 2025-03-27 RX ADMIN — ALBUMIN (HUMAN) 50 G: 0.25 INJECTION, SOLUTION INTRAVENOUS at 11:59

## 2025-03-27 RX ADMIN — LIDOCAINE HYDROCHLORIDE 10 ML: 10 INJECTION, SOLUTION EPIDURAL; INFILTRATION; INTRACAUDAL; PERINEURAL at 11:25

## 2025-03-27 NOTE — BRIEF OP NOTE
Department of Interventional Radiology Post-Procedure Note      Date: 3/27/2025     Interventional Radiologist: Constantine    Procedure Performed:  Paracentesis    H&P Status: H&P was reviewed, the patient was examined and no change has occurred in patient's condition since H&P was completed.    Pre-procedure Diagnosis:  Ascites    Post-procedure Diagnosis:  Same    Sedation:  none    Contrast used:  none    Estimated blood loss:  Minimal    Preliminary Findings:  Successful    Complications:  none    Full Report to Follow.    Electronically signed by Jennifer Fitch MD on 3/27/2025 at 12:00 PM

## 2025-03-27 NOTE — PROGRESS NOTES
3/27/25 - Per the wife, Gaby, they are not available to talk for PAT. Will call me back.    .Surgery @ [facility] on 4/8/25 you will be called 4/7/25 with times    FOLLOW THE OFFICE INSTRUCTIONS FOR THE BOWEL PREP    1. Enter thru the hospital main entrance on day of surgery, check in at the Information Desk. If you arrive prior to 6:00am, enter thru the ER entrance.    2. Follow the directions as prescribed by the doctor for your procedure and medications.         Morning of surgery take:  Carvedilol & Levothyroxine with sips of water         Stop vitamins, supplements and NSAIDS:  4/1/25    3. Check with your Doctor regarding stopping blood thinners and follow their instructions.    4. Do not smoke, vape or use chewing tobacco morning of surgery. Do not drink any alcoholic beverages 24 hours prior to surgery.       This includes NA Beer. No street drugs 7 days prior to surgery.    5. If you have dentures, contacts of glasses they will be removed before going to the OR; please bring a case.    6. Please bring picture ID, insurance card, paperwork from the doctor’s office (H & P, Consent, & card for implantable devices).    7. Take a shower with an antibacterial soap the night before surgery and the morning of surgery. Do not put anything on your skin      After your morning shower.    8. You will need a responsible adult to drive you home and check on you after surgery.

## 2025-03-27 NOTE — CONSULTS
Consult Interventional Radiology    Date:3/27/2025  Name:Boby Glasgow   :1945   MR#:[id number]    SEX:male     Planned procedure:  paracentesis  Indication: Ascites    H&P Status: H&P was reviewed, the patient was examined and no change has occurred in patient's condition since H&P was completed.    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  Initially treated with vancomycin but had a supratherapeutic level, hence, it was substituted by daptomycin.  Cumulative 6-week course of therapy will be completed on 3/13/2023.    Anxiety     Chronic heart failure, unspecified heart failure type (HCC) 2023    Chronic heart failure, unspecified heart failure type (HCC) 2023    Cirrhosis, alcoholic (HCC)     Diabetes mellitus (HCC)     GERD (gastroesophageal reflux disease)     GERD (gastroesophageal reflux disease)     Hyperlipidemia     Hypertension     Meniere's disease     Portal hypertension (HCC)     Pulmonary nodules     Secondary esophageal varices without bleeding (HCC)     Sleep apnea     SOB (shortness of breath)     Thyroid disease         Past Surgical History:  Past Surgical History:   Procedure Laterality Date    APPENDECTOMY      BACK SURGERY      CHOLECYSTECTOMY      COLONOSCOPY      ENDOSCOPY, COLON, DIAGNOSTIC      FOOT SURGERY      needle removed,not sure which foot, per wife.    TONSILLECTOMY      UPPER GASTROINTESTINAL ENDOSCOPY N/A 2022    EGD BIOPSY performed by Travis Phelan MD at [facility] ENDOSCOPY    VASECTOMY         Social History:  Social History     Socioeconomic History    Marital status:      Spouse name: Not on file    Number of children: Not on file    Years of education: Not on file    Highest education level: Not on file   Occupational History     Comment: Retired    Tobacco Use    Smoking status: Former     Current

## 2025-03-27 NOTE — PROGRESS NOTES
TRANSFER - OUT REPORT:    Verbal report given to Rhea on Boby Glasgow being transferred to IR [facility] for routine progression of patient care       Report consisted of patient's Situation, Background, Assessment and   Recommendations(SBAR).     Information from the following report(s) Nurse Handoff Report was reviewed with the receiving nurse.    Opportunity for questions and clarification was provided.      Patient transported with:   Registered Nurse

## 2025-03-27 NOTE — OR NURSING
Pt had a paracentesis performed by Dr Fitch with a total of 8250 cc of cloudy yellow fluid removed.  Ot that 1,000 cc of fluid sent to lab

## 2025-03-27 NOTE — PROGRESS NOTES
Pt to post op following paracentesis. VSS denies pain, dressing clean dry and intact. Discharge education complete.

## 2025-03-28 RX ORDER — FUROSEMIDE 40 MG/1
40 TABLET ORAL DAILY
Qty: 60 TABLET | Refills: 0 | OUTPATIENT
Start: 2025-03-28

## 2025-04-01 NOTE — PROGRESS NOTES
IR Procedure at [facility]:  Spoke with patient's wife Gaby and patient will arrive at 1030 at [facility] on 4/2/2025 for his procedure at 1130. Patient has been given below instructions and patient will take his  medications.       Follow your directions as prescribed by the doctor for your procedure and medications.  3.   Consult your provider as to when to stop blood thinner  4.   Do not take any pain medication within 6 hours of your procedure  5.   Do not drink any alcoholic beverages or use any street drugs 24 hours before procedure.  6.   Please wear simple, loose fitting clothing to the hospital.  Do not bring valuables (money,             credit cards, checkbooks, etc.)     7.   If you  have a Living Will and Durable Power of  for Healthcare, please bring in a copy.  8.   Please bring picture ID,  insurance card, paperwork from the doctors office            (H & P, Consent,  & card for implantable devices).  9.   Report to the information desk on the ground floor.  10. Take a shower the night before or morning of your procedure, do not apply any lotion, oil or powder.  11. You will need a responsible adult

## 2025-04-02 ENCOUNTER — OFFICE VISIT (OUTPATIENT)
Dept: FAMILY MEDICINE CLINIC | Age: 80
End: 2025-04-02
Payer: COMMERCIAL

## 2025-04-02 ENCOUNTER — HOSPITAL ENCOUNTER (OUTPATIENT)
Dept: INTERVENTIONAL RADIOLOGY/VASCULAR | Age: 80
Discharge: HOME | End: 2025-04-02
Payer: COMMERCIAL

## 2025-04-02 VITALS
SYSTOLIC BLOOD PRESSURE: 122 MMHG | HEART RATE: 92 BPM | OXYGEN SATURATION: 98 % | WEIGHT: 267 LBS | HEIGHT: 68 IN | DIASTOLIC BLOOD PRESSURE: 78 MMHG | BODY MASS INDEX: 40.47 KG/M2

## 2025-04-02 VITALS
RESPIRATION RATE: 18 BRPM | SYSTOLIC BLOOD PRESSURE: 137 MMHG | OXYGEN SATURATION: 97 % | TEMPERATURE: 97.5 F | DIASTOLIC BLOOD PRESSURE: 72 MMHG | HEART RATE: 77 BPM

## 2025-04-02 DIAGNOSIS — K70.31 ALCOHOLIC CIRRHOSIS OF LIVER WITH ASCITES (HCC): ICD-10-CM

## 2025-04-02 DIAGNOSIS — E03.9 ACQUIRED HYPOTHYROIDISM: ICD-10-CM

## 2025-04-02 DIAGNOSIS — E11.22 TYPE 2 DIABETES MELLITUS WITH STAGE 3 CHRONIC KIDNEY DISEASE, WITHOUT LONG-TERM CURRENT USE OF INSULIN, UNSPECIFIED WHETHER STAGE 3A OR 3B CKD (HCC): ICD-10-CM

## 2025-04-02 DIAGNOSIS — F41.9 ANXIETY: ICD-10-CM

## 2025-04-02 DIAGNOSIS — K74.60 CIRRHOSIS OF LIVER WITH ASCITES, UNSPECIFIED HEPATIC CIRRHOSIS TYPE (HCC): ICD-10-CM

## 2025-04-02 DIAGNOSIS — K70.31 ASCITES DUE TO ALCOHOLIC CIRRHOSIS (HCC): Primary | ICD-10-CM

## 2025-04-02 DIAGNOSIS — R18.8 CIRRHOSIS OF LIVER WITH ASCITES, UNSPECIFIED HEPATIC CIRRHOSIS TYPE (HCC): ICD-10-CM

## 2025-04-02 DIAGNOSIS — I10 PRIMARY HYPERTENSION: Primary | ICD-10-CM

## 2025-04-02 DIAGNOSIS — N18.30 TYPE 2 DIABETES MELLITUS WITH STAGE 3 CHRONIC KIDNEY DISEASE, WITHOUT LONG-TERM CURRENT USE OF INSULIN, UNSPECIFIED WHETHER STAGE 3A OR 3B CKD (HCC): ICD-10-CM

## 2025-04-02 PROCEDURE — P9047 ALBUMIN (HUMAN), 25%, 50ML: HCPCS

## 2025-04-02 PROCEDURE — 3074F SYST BP LT 130 MM HG: CPT | Performed by: INTERNAL MEDICINE

## 2025-04-02 PROCEDURE — C1729 CATH, DRAINAGE: HCPCS

## 2025-04-02 PROCEDURE — 87070 CULTURE OTHR SPECIMN AEROBIC: CPT

## 2025-04-02 PROCEDURE — 3078F DIAST BP <80 MM HG: CPT | Performed by: INTERNAL MEDICINE

## 2025-04-02 PROCEDURE — 87205 SMEAR GRAM STAIN: CPT

## 2025-04-02 PROCEDURE — 99214 OFFICE O/P EST MOD 30 MIN: CPT | Performed by: INTERNAL MEDICINE

## 2025-04-02 PROCEDURE — P9047 ALBUMIN (HUMAN), 25%, 50ML: HCPCS | Performed by: INTERNAL MEDICINE

## 2025-04-02 PROCEDURE — 6360000002 HC RX W HCPCS

## 2025-04-02 PROCEDURE — 1123F ACP DISCUSS/DSCN MKR DOCD: CPT | Performed by: INTERNAL MEDICINE

## 2025-04-02 PROCEDURE — 49083 ABD PARACENTESIS W/IMAGING: CPT

## 2025-04-02 PROCEDURE — 6360000002 HC RX W HCPCS: Performed by: INTERNAL MEDICINE

## 2025-04-02 PROCEDURE — 1159F MED LIST DOCD IN RCRD: CPT | Performed by: INTERNAL MEDICINE

## 2025-04-02 PROCEDURE — 87075 CULTR BACTERIA EXCEPT BLOOD: CPT

## 2025-04-02 PROCEDURE — 49083 ABD PARACENTESIS W/IMAGING: CPT | Performed by: INTERNAL MEDICINE

## 2025-04-02 RX ORDER — LEVOTHYROXINE SODIUM 50 UG/1
50 TABLET ORAL DAILY
Qty: 90 TABLET | Refills: 1 | Status: SHIPPED | OUTPATIENT
Start: 2025-04-02

## 2025-04-02 RX ORDER — ALPRAZOLAM 0.5 MG
0.5 TABLET ORAL 2 TIMES DAILY PRN
Qty: 45 TABLET | Refills: 2 | Status: SHIPPED | OUTPATIENT
Start: 2025-04-02 | End: 2025-07-01

## 2025-04-02 RX ORDER — ALBUMIN (HUMAN) 12.5 G/50ML
SOLUTION INTRAVENOUS CONTINUOUS PRN
Status: COMPLETED | OUTPATIENT
Start: 2025-04-02 | End: 2025-04-02

## 2025-04-02 RX ORDER — FUROSEMIDE 40 MG/1
40 TABLET ORAL DAILY
Qty: 90 TABLET | Refills: 1 | Status: SHIPPED | OUTPATIENT
Start: 2025-04-02

## 2025-04-02 RX ORDER — SPIRONOLACTONE 100 MG/1
100 TABLET, FILM COATED ORAL 2 TIMES DAILY
Qty: 180 TABLET | Refills: 1 | Status: SHIPPED | OUTPATIENT
Start: 2025-04-02

## 2025-04-02 RX ORDER — LIDOCAINE HYDROCHLORIDE 10 MG/ML
INJECTION, SOLUTION EPIDURAL; INFILTRATION; INTRACAUDAL; PERINEURAL PRN
Status: COMPLETED | OUTPATIENT
Start: 2025-04-02 | End: 2025-04-02

## 2025-04-02 RX ORDER — DULOXETIN HYDROCHLORIDE 60 MG/1
60 CAPSULE, DELAYED RELEASE ORAL DAILY
Qty: 90 CAPSULE | Refills: 0 | Status: SHIPPED | OUTPATIENT
Start: 2025-04-02

## 2025-04-02 RX ORDER — ALPRAZOLAM 0.5 MG
0.5 TABLET ORAL 2 TIMES DAILY PRN
COMMUNITY
End: 2025-04-02 | Stop reason: SDUPTHER

## 2025-04-02 RX ADMIN — LIDOCAINE HYDROCHLORIDE 10 ML: 10 INJECTION, SOLUTION EPIDURAL; INFILTRATION; INTRACAUDAL; PERINEURAL at 12:26

## 2025-04-02 RX ADMIN — ALBUMIN (HUMAN) 50 G: 0.25 INJECTION, SOLUTION INTRAVENOUS at 12:53

## 2025-04-02 SDOH — ECONOMIC STABILITY: FOOD INSECURITY: WITHIN THE PAST 12 MONTHS, YOU WORRIED THAT YOUR FOOD WOULD RUN OUT BEFORE YOU GOT MONEY TO BUY MORE.: NEVER TRUE

## 2025-04-02 SDOH — ECONOMIC STABILITY: FOOD INSECURITY: WITHIN THE PAST 12 MONTHS, THE FOOD YOU BOUGHT JUST DIDN'T LAST AND YOU DIDN'T HAVE MONEY TO GET MORE.: NEVER TRUE

## 2025-04-02 ASSESSMENT — PATIENT HEALTH QUESTIONNAIRE - PHQ9
SUM OF ALL RESPONSES TO PHQ QUESTIONS 1-9: 8
6. FEELING BAD ABOUT YOURSELF - OR THAT YOU ARE A FAILURE OR HAVE LET YOURSELF OR YOUR FAMILY DOWN: NOT AT ALL
SUM OF ALL RESPONSES TO PHQ QUESTIONS 1-9: 8
3. TROUBLE FALLING OR STAYING ASLEEP: NEARLY EVERY DAY
7. TROUBLE CONCENTRATING ON THINGS, SUCH AS READING THE NEWSPAPER OR WATCHING TELEVISION: SEVERAL DAYS
SUM OF ALL RESPONSES TO PHQ QUESTIONS 1-9: 8
10. IF YOU CHECKED OFF ANY PROBLEMS, HOW DIFFICULT HAVE THESE PROBLEMS MADE IT FOR YOU TO DO YOUR WORK, TAKE CARE OF THINGS AT HOME, OR GET ALONG WITH OTHER PEOPLE: SOMEWHAT DIFFICULT
1. LITTLE INTEREST OR PLEASURE IN DOING THINGS: NOT AT ALL
2. FEELING DOWN, DEPRESSED OR HOPELESS: SEVERAL DAYS
SUM OF ALL RESPONSES TO PHQ QUESTIONS 1-9: 8
9. THOUGHTS THAT YOU WOULD BE BETTER OFF DEAD, OR OF HURTING YOURSELF: NOT AT ALL
5. POOR APPETITE OR OVEREATING: NOT AT ALL
8. MOVING OR SPEAKING SO SLOWLY THAT OTHER PEOPLE COULD HAVE NOTICED. OR THE OPPOSITE, BEING SO FIGETY OR RESTLESS THAT YOU HAVE BEEN MOVING AROUND A LOT MORE THAN USUAL: NOT AT ALL
4. FEELING TIRED OR HAVING LITTLE ENERGY: NEARLY EVERY DAY

## 2025-04-02 NOTE — OR NURSING
Pt had a paracentesis performed by Dr Mera with a total of 7300 cc of cloudy yellow fluid removed of that 700 cc of fluid sent to lab.

## 2025-04-02 NOTE — PROCEDURES
PROCEDURE NOTE  Date: 4/2/2025   Name: Boby Glasgow  YOB: 1945    Paracentesis    Date/Time: 4/2/2025 2:57 PM    Performed by: Hernesto Mera MD  Authorized by: Hernesto Mera MD  Consent: Verbal consent obtained. Written consent obtained.  Risks and benefits: risks, benefits and alternatives were discussed  Consent given by: patient  Patient understanding: patient states understanding of the procedure being performed  Patient consent: the patient's understanding of the procedure matches consent given  Procedure consent: procedure consent matches procedure scheduled  Relevant documents: relevant documents present and verified  Test results: test results available and properly labeled  Site marked: the operative site was marked  Imaging studies: imaging studies available  Required items: required blood products, implants, devices, and special equipment available  Patient identity confirmed: verbally with patient and arm band  Time out: Immediately prior to procedure a \"time out\" was called to verify the correct patient, procedure, equipment, support staff and site/side marked as required.  Initial or subsequent exam: subsequent  Procedure purpose: therapeutic  Indications: abdominal discomfort secondary to ascites  Anesthesia: local infiltration    Anesthesia:  Local Anesthetic: lidocaine 1% without epinephrine  Anesthetic total: 10 mL    Sedation:  Patient sedated: no    Preparation: Patient was prepped and draped in the usual sterile fashion.  Needle gauge: 18  Ultrasound guidance: yes  Puncture site: right lower quadrant  Fluid removed: 3500(ml)  Fluid appearance: chylous  Dressing: 4x4 sterile gauze  Patient tolerance: patient tolerated the procedure well with no immediate complications

## 2025-04-02 NOTE — PROGRESS NOTES
Outpatient Clinic Visit Note    Patient: Boby Glasgow  : 1945 (79 y.o.)  Date: 2025    CC:  Chief Complaint   Patient presents with    Medication Refill     Regular check up and refills    Anxiety     Increased anxiety.         HPI: he feels badly today because his nephew was found dead yesterday (age 63), with a family history of sudden death.  He denies chest pain or shortness of breath.  He goes every week for paracentesis.  He is on lasix and aldactone and rifaxamin.     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  Initially treated with vancomycin but had a supratherapeutic level, hence, it was substituted by daptomycin.  Cumulative 6-week course of therapy will be completed on 3/13/2023.    Anxiety     Chronic heart failure, unspecified heart failure type (HCC) 2023    Cirrhosis, alcoholic (HCC)     Diabetes mellitus (HCC)     GERD (gastroesophageal reflux disease)     Hyperlipidemia     Hypertension     Meniere's disease     Portal hypertension (HCC)     Pulmonary nodules     Secondary esophageal varices without bleeding (HCC)     Sleep apnea     SOB (shortness of breath)     Thyroid disease        Past Surgical History:  Past Surgical History:   Procedure Laterality Date    APPENDECTOMY      BACK SURGERY      CHOLECYSTECTOMY      COLONOSCOPY      ENDOSCOPY, COLON, DIAGNOSTIC      FOOT SURGERY      needle removed,not sure which foot, per wife.    TONSILLECTOMY      UPPER GASTROINTESTINAL ENDOSCOPY N/A 2022    EGD BIOPSY performed by Travis Phelan MD at [facility] ENDOSCOPY    VASECTOMY         Home Medications:  Current Outpatient Medications   Medication Sig Dispense Refill    DULoxetine (CYMBALTA) 60 MG extended release capsule Take 1 capsule by mouth daily 90 capsule 0    furosemide (LASIX) 40 MG tablet Take 1 tablet by mouth daily 90 tablet 1

## 2025-04-02 NOTE — PROGRESS NOTES
TRANSFER - OUT REPORT:    Verbal report given to Janelle LAGUNAS on Boby Glasgow being transferred to IR [facility] for routine progression of patient care       Report consisted of patient's Situation, Background, Assessment and   Recommendations(SBAR).     Information from the following report(s) Nurse Handoff Report was reviewed with the receiving nurse.    Opportunity for questions and clarification was provided.      Patient transported with:   Registered Nurse

## 2025-04-03 ENCOUNTER — HOSPITAL ENCOUNTER (OUTPATIENT)
Dept: INTERVENTIONAL RADIOLOGY/VASCULAR | Age: 80
Discharge: HOME | End: 2025-04-03

## 2025-04-07 ENCOUNTER — ANESTHESIA EVENT (OUTPATIENT)
Dept: ENDOSCOPY | Age: 80
End: 2025-04-07
Payer: COMMERCIAL

## 2025-04-07 RX ORDER — SODIUM CHLORIDE 0.9 % (FLUSH) 0.9 %
5-40 SYRINGE (ML) INJECTION EVERY 12 HOURS SCHEDULED
Status: CANCELLED | OUTPATIENT
Start: 2025-04-07

## 2025-04-07 RX ORDER — SODIUM CHLORIDE 9 MG/ML
INJECTION, SOLUTION INTRAVENOUS PRN
Status: CANCELLED | OUTPATIENT
Start: 2025-04-07

## 2025-04-07 RX ORDER — SODIUM CHLORIDE 0.9 % (FLUSH) 0.9 %
5-40 SYRINGE (ML) INJECTION PRN
Status: CANCELLED | OUTPATIENT
Start: 2025-04-07

## 2025-04-07 ASSESSMENT — ENCOUNTER SYMPTOMS: SHORTNESS OF BREATH: 1

## 2025-04-07 NOTE — ANESTHESIA PRE PROCEDURE
treated with vancomycin but had a supratherapeutic level, hence, it was substituted by daptomycin.  Cumulative 6-week course of therapy will be completed on 3/13/2023.    Anxiety     Chronic heart failure, unspecified heart failure type (HCC) 2023    Cirrhosis, alcoholic (HCC)     Diabetes mellitus (HCC)     GERD (gastroesophageal reflux disease)     Hyperlipidemia     Hypertension     Meniere's disease     Portal hypertension (HCC)     Pulmonary nodules     Secondary esophageal varices without bleeding (HCC)     Sleep apnea     SOB (shortness of breath)     Thyroid disease        Past Surgical History:        Procedure Laterality Date    APPENDECTOMY      BACK SURGERY      CHOLECYSTECTOMY      COLONOSCOPY      ENDOSCOPY, COLON, DIAGNOSTIC      FOOT SURGERY      needle removed,not sure which foot, per wife.    TONSILLECTOMY      UPPER GASTROINTESTINAL ENDOSCOPY N/A 2022    EGD BIOPSY performed by Travis Phelan MD at [facility] ENDOSCOPY    VASECTOMY         Social History:    Social History     Tobacco Use    Smoking status: Former     Current packs/day: 0.00     Average packs/day: 1 pack/day for 14.0 years (14.0 ttl pk-yrs)     Types: Cigarettes     Start date:      Quit date:      Years since quittin.2    Smokeless tobacco: Never   Substance Use Topics    Alcohol use: Not Currently                                Counseling given: Not Answered      Vital Signs (Current):   Vitals:    25 1456   Weight: 114.3 kg (252 lb)   Height: 1.727 m (5' 8\")                                              BP Readings from Last 3 Encounters:   25 137/72   25 122/78   25 (!) 176/67       NPO Status:                                                                                 BMI:   Wt Readings from Last 3 Encounters:   25 121.1 kg (267 lb)   25 114.7 kg (252 lb 12.8 oz)   25 111.1 kg (245 lb)     Body mass index is 38.32 kg/m².    CBC:   Lab Results   Component  of breath:   sleep apnea:                                  Cardiovascular:    (+) hypertension:, hyperlipidemia      ECG reviewed      Echocardiogram reviewed         Beta Blocker:  Dose within 24 Hrs      ROS comment: ECHO 3/17/2023   Summary:   Dilated left atrium.   No evidence of thrombus within the left atrial appendage.   Negative bubble study; no ASD or PFO noted.   Mild tricuspid regurgitation is present.   No evidence of endocarditis noted.   There is no evidence of any pericardial effusion.    ECHO 1/31/2023   Summary   This is a limited echocardiogram.   Left ventricular systolic function is normal.   Ejection fraction is visually estimated at 55-60%.   No significant valvular disease noted.   No obvious evidence of endocarditis noted.   No evidence of any pericardial effusion.         Neuro/Psych:   (+) psychiatric history:depression/anxiety              ROS comment: Hx alcohol abuse, hepatic encephalopathy GI/Hepatic/Renal:   (+) GERD:, liver disease: portal hypertension, renal disease: CRI, bowel prep, morbid obesity         ROS comment: Liver cirrhosis, weekly paracentesis.   Endo/Other:    (+) DiabetesType II DM, hypothyroidism::..                 Abdominal:             Vascular:          Other Findings:         Anesthesia Plan      MAC     ASA 4       Induction: intravenous.      Anesthetic plan and risks discussed with patient.      Plan discussed with attending.                  MOHINDER Perry - CRNA   4/7/2025

## 2025-04-07 NOTE — PROGRESS NOTES
4/7/25 - LM for Gaby (wife) - Boby's surgery @ [facility] on  4/8/25 is @ 0845, arrival 0715. NPO status and morning medications reviewed. Please call with any questions.

## 2025-04-08 ENCOUNTER — HOSPITAL ENCOUNTER (OUTPATIENT)
Age: 80
Setting detail: OUTPATIENT SURGERY
Discharge: HOME | End: 2025-04-08
Attending: INTERNAL MEDICINE | Admitting: INTERNAL MEDICINE
Payer: COMMERCIAL

## 2025-04-08 ENCOUNTER — ANESTHESIA (OUTPATIENT)
Dept: ENDOSCOPY | Age: 80
End: 2025-04-08
Payer: COMMERCIAL

## 2025-04-08 VITALS
DIASTOLIC BLOOD PRESSURE: 73 MMHG | HEART RATE: 84 BPM | WEIGHT: 252 LBS | RESPIRATION RATE: 18 BRPM | OXYGEN SATURATION: 97 % | TEMPERATURE: 98 F | BODY MASS INDEX: 38.19 KG/M2 | HEIGHT: 68 IN | SYSTOLIC BLOOD PRESSURE: 138 MMHG

## 2025-04-08 DIAGNOSIS — K74.60 DECOMPENSATED CIRRHOSIS (HCC): ICD-10-CM

## 2025-04-08 DIAGNOSIS — D12.6 TUBULAR ADENOMA OF COLON: ICD-10-CM

## 2025-04-08 DIAGNOSIS — K72.90 DECOMPENSATED CIRRHOSIS (HCC): ICD-10-CM

## 2025-04-08 DIAGNOSIS — K70.31 ALCOHOLIC CIRRHOSIS OF LIVER WITH ASCITES (HCC): ICD-10-CM

## 2025-04-08 LAB
GLUCOSE BLD-MCNC: 87 MG/DL (ref 74–99)
GLUCOSE BLD-MCNC: 98 MG/DL (ref 74–99)

## 2025-04-08 PROCEDURE — 7100000001 HC PACU RECOVERY - ADDTL 15 MIN: Performed by: INTERNAL MEDICINE

## 2025-04-08 PROCEDURE — 2720000010 HC SURG SUPPLY STERILE: Performed by: INTERNAL MEDICINE

## 2025-04-08 PROCEDURE — 2709999900 HC NON-CHARGEABLE SUPPLY: Performed by: INTERNAL MEDICINE

## 2025-04-08 PROCEDURE — 7100000010 HC PHASE II RECOVERY - FIRST 15 MIN: Performed by: INTERNAL MEDICINE

## 2025-04-08 PROCEDURE — 3700000001 HC ADD 15 MINUTES (ANESTHESIA): Performed by: INTERNAL MEDICINE

## 2025-04-08 PROCEDURE — 3700000000 HC ANESTHESIA ATTENDED CARE: Performed by: INTERNAL MEDICINE

## 2025-04-08 PROCEDURE — 7100000000 HC PACU RECOVERY - FIRST 15 MIN: Performed by: INTERNAL MEDICINE

## 2025-04-08 PROCEDURE — 3609010600 HC COLONOSCOPY POLYPECTOMY SNARE/COLD BIOPSY: Performed by: INTERNAL MEDICINE

## 2025-04-08 PROCEDURE — 2500000003 HC RX 250 WO HCPCS

## 2025-04-08 PROCEDURE — 7100000011 HC PHASE II RECOVERY - ADDTL 15 MIN: Performed by: INTERNAL MEDICINE

## 2025-04-08 PROCEDURE — 6360000002 HC RX W HCPCS

## 2025-04-08 PROCEDURE — 82962 GLUCOSE BLOOD TEST: CPT

## 2025-04-08 PROCEDURE — 2580000003 HC RX 258

## 2025-04-08 PROCEDURE — 88305 TISSUE EXAM BY PATHOLOGIST: CPT | Performed by: PATHOLOGY

## 2025-04-08 PROCEDURE — 88342 IMHCHEM/IMCYTCHM 1ST ANTB: CPT | Performed by: PATHOLOGY

## 2025-04-08 PROCEDURE — 3609013000 HC EGD TRANSORAL CONTROL BLEEDING ANY METHOD: Performed by: INTERNAL MEDICINE

## 2025-04-08 RX ORDER — ONDANSETRON 2 MG/ML
INJECTION INTRAMUSCULAR; INTRAVENOUS
Status: DISCONTINUED | OUTPATIENT
Start: 2025-04-08 | End: 2025-04-08 | Stop reason: SDUPTHER

## 2025-04-08 RX ORDER — LIDOCAINE HYDROCHLORIDE 20 MG/ML
INJECTION, SOLUTION EPIDURAL; INFILTRATION; INTRACAUDAL; PERINEURAL
Status: DISCONTINUED | OUTPATIENT
Start: 2025-04-08 | End: 2025-04-08 | Stop reason: SDUPTHER

## 2025-04-08 RX ORDER — SODIUM CHLORIDE, SODIUM LACTATE, POTASSIUM CHLORIDE, CALCIUM CHLORIDE 600; 310; 30; 20 MG/100ML; MG/100ML; MG/100ML; MG/100ML
INJECTION, SOLUTION INTRAVENOUS
Status: DISCONTINUED | OUTPATIENT
Start: 2025-04-08 | End: 2025-04-08 | Stop reason: SDUPTHER

## 2025-04-08 RX ORDER — PROPOFOL 10 MG/ML
INJECTION, EMULSION INTRAVENOUS
Status: DISCONTINUED | OUTPATIENT
Start: 2025-04-08 | End: 2025-04-08 | Stop reason: SDUPTHER

## 2025-04-08 RX ORDER — FENTANYL CITRATE 50 UG/ML
25 INJECTION, SOLUTION INTRAMUSCULAR; INTRAVENOUS EVERY 5 MIN PRN
Status: DISCONTINUED | OUTPATIENT
Start: 2025-04-08 | End: 2025-04-08 | Stop reason: HOSPADM

## 2025-04-08 RX ORDER — FENTANYL CITRATE 50 UG/ML
INJECTION, SOLUTION INTRAMUSCULAR; INTRAVENOUS
Status: DISCONTINUED | OUTPATIENT
Start: 2025-04-08 | End: 2025-04-08 | Stop reason: SDUPTHER

## 2025-04-08 RX ORDER — ROCURONIUM BROMIDE 10 MG/ML
INJECTION, SOLUTION INTRAVENOUS
Status: DISCONTINUED | OUTPATIENT
Start: 2025-04-08 | End: 2025-04-08 | Stop reason: SDUPTHER

## 2025-04-08 RX ORDER — SODIUM CHLORIDE 0.9 % (FLUSH) 0.9 %
5-40 SYRINGE (ML) INJECTION PRN
Status: DISCONTINUED | OUTPATIENT
Start: 2025-04-08 | End: 2025-04-08 | Stop reason: HOSPADM

## 2025-04-08 RX ORDER — NALOXONE HYDROCHLORIDE 0.4 MG/ML
INJECTION, SOLUTION INTRAMUSCULAR; INTRAVENOUS; SUBCUTANEOUS PRN
Status: DISCONTINUED | OUTPATIENT
Start: 2025-04-08 | End: 2025-04-08 | Stop reason: HOSPADM

## 2025-04-08 RX ORDER — SODIUM CHLORIDE 0.9 % (FLUSH) 0.9 %
5-40 SYRINGE (ML) INJECTION EVERY 12 HOURS SCHEDULED
Status: DISCONTINUED | OUTPATIENT
Start: 2025-04-08 | End: 2025-04-08 | Stop reason: HOSPADM

## 2025-04-08 RX ORDER — SODIUM CHLORIDE 9 MG/ML
INJECTION, SOLUTION INTRAVENOUS PRN
Status: DISCONTINUED | OUTPATIENT
Start: 2025-04-08 | End: 2025-04-08 | Stop reason: HOSPADM

## 2025-04-08 RX ORDER — ESMOLOL HYDROCHLORIDE 10 MG/ML
INJECTION INTRAVENOUS
Status: DISCONTINUED | OUTPATIENT
Start: 2025-04-08 | End: 2025-04-08 | Stop reason: SDUPTHER

## 2025-04-08 RX ORDER — DEXAMETHASONE SODIUM PHOSPHATE 4 MG/ML
INJECTION, SOLUTION INTRA-ARTICULAR; INTRALESIONAL; INTRAMUSCULAR; INTRAVENOUS; SOFT TISSUE
Status: DISCONTINUED | OUTPATIENT
Start: 2025-04-08 | End: 2025-04-08 | Stop reason: SDUPTHER

## 2025-04-08 RX ORDER — ONDANSETRON 2 MG/ML
4 INJECTION INTRAMUSCULAR; INTRAVENOUS
Status: DISCONTINUED | OUTPATIENT
Start: 2025-04-08 | End: 2025-04-08 | Stop reason: HOSPADM

## 2025-04-08 RX ORDER — CARVEDILOL 6.25 MG/1
12.5 TABLET ORAL 2 TIMES DAILY WITH MEALS
Qty: 120 TABLET | Refills: 1 | Status: SHIPPED | OUTPATIENT
Start: 2025-04-08

## 2025-04-08 RX ORDER — LABETALOL HYDROCHLORIDE 5 MG/ML
10 INJECTION, SOLUTION INTRAVENOUS
Status: DISCONTINUED | OUTPATIENT
Start: 2025-04-08 | End: 2025-04-08 | Stop reason: HOSPADM

## 2025-04-08 RX ORDER — METOCLOPRAMIDE HYDROCHLORIDE 5 MG/ML
10 INJECTION INTRAMUSCULAR; INTRAVENOUS
Status: DISCONTINUED | OUTPATIENT
Start: 2025-04-08 | End: 2025-04-08 | Stop reason: HOSPADM

## 2025-04-08 RX ADMIN — ONDANSETRON 4 MG: 2 INJECTION INTRAMUSCULAR; INTRAVENOUS at 09:26

## 2025-04-08 RX ADMIN — ESMOLOL HYDROCHLORIDE 30 MG: 10 INJECTION, SOLUTION INTRAVENOUS at 09:03

## 2025-04-08 RX ADMIN — ROCURONIUM BROMIDE 80 MG: 50 INJECTION INTRAVENOUS at 09:00

## 2025-04-08 RX ADMIN — PHENYLEPHRINE HYDROCHLORIDE 50 MCG: 10 INJECTION INTRAVENOUS at 09:15

## 2025-04-08 RX ADMIN — PROPOFOL 100 MG: 10 INJECTION, EMULSION INTRAVENOUS at 09:00

## 2025-04-08 RX ADMIN — DEXAMETHASONE SODIUM PHOSPHATE 4 MG: 4 INJECTION, SOLUTION INTRAMUSCULAR; INTRAVENOUS at 09:26

## 2025-04-08 RX ADMIN — SUGAMMADEX 200 MG: 100 INJECTION, SOLUTION INTRAVENOUS at 09:47

## 2025-04-08 RX ADMIN — LIDOCAINE HYDROCHLORIDE 100 MG: 20 INJECTION, SOLUTION EPIDURAL; INFILTRATION; INTRACAUDAL; PERINEURAL at 09:00

## 2025-04-08 RX ADMIN — PHENYLEPHRINE HYDROCHLORIDE 50 MCG: 10 INJECTION INTRAVENOUS at 09:43

## 2025-04-08 RX ADMIN — SUGAMMADEX 100 MG: 100 INJECTION, SOLUTION INTRAVENOUS at 09:55

## 2025-04-08 RX ADMIN — SODIUM CHLORIDE, POTASSIUM CHLORIDE, SODIUM LACTATE AND CALCIUM CHLORIDE: 600; 310; 30; 20 INJECTION, SOLUTION INTRAVENOUS at 08:55

## 2025-04-08 RX ADMIN — FENTANYL CITRATE 50 MCG: 50 INJECTION, SOLUTION INTRAMUSCULAR; INTRAVENOUS at 09:10

## 2025-04-08 RX ADMIN — ESMOLOL HYDROCHLORIDE 20 MG: 10 INJECTION, SOLUTION INTRAVENOUS at 09:58

## 2025-04-08 ASSESSMENT — PAIN - FUNCTIONAL ASSESSMENT
PAIN_FUNCTIONAL_ASSESSMENT: 0-10

## 2025-04-08 ASSESSMENT — PAIN SCALES - WONG BAKER
WONGBAKER_NUMERICALRESPONSE: NO HURT
WONGBAKER_NUMERICALRESPONSE: NO HURT

## 2025-04-08 ASSESSMENT — PAIN SCALES - GENERAL
PAINLEVEL_OUTOF10: 0

## 2025-04-08 NOTE — PROGRESS NOTES
IR Procedure at [facility]:  Spoke with Gaby dumont wife and he will arrive at 1030 at [facility] on 4/10/2025 for his procedure at 1130. Belwo instructions have been given and patient will take his medications as scheduled.      Follow your directions as prescribed by the doctor for your procedure and medications.  3.   Consult your provider as to when to stop blood thinner  4.   Do not take any pain medication within 6 hours of your procedure  5.   Do not drink any alcoholic beverages or use any street drugs 24 hours before procedure.  6.   Please wear simple, loose fitting clothing to the hospital.  Do not bring valuables (money,             credit cards, checkbooks, etc.)     7.   If you  have a Living Will and Durable Power of  for Healthcare, please bring in a copy.  8.   Please bring picture ID,  insurance card, paperwork from the doctors office            (H & P, Consent,  & card for implantable devices).  9.   Report to the information desk on the ground floor.  10. Take a shower the night before or morning of your procedure, do not apply any lotion, oil or powder.  11. You will need a responsible adult

## 2025-04-08 NOTE — ANESTHESIA POSTPROCEDURE EVALUATION
Department of Anesthesiology  Postprocedure Note    Patient: Boby Glasgow  MRN: [id number]  YOB: 1945  Date of evaluation: 4/8/2025    Procedure Summary       Date: 04/08/25 Room / Location: [place] [id number] / [facility]    Anesthesia Start: 0855 Anesthesia Stop: 1008    Procedures:       COLONOSCOPY POLYPECTOMY SNARE/BIOPSY      ESOPHAGOGASTRODUODENOSCOPY CONTROL HEMORRHAGE with APC with biopsies Diagnosis:       Tubular adenoma of colon      Decompensated cirrhosis (HCC)      Alcoholic cirrhosis of liver with ascites (HCC)      (Tubular adenoma of colon [D12.6])      (Decompensated cirrhosis (HCC) [K72.90, K74.60])      (Alcoholic cirrhosis of liver with ascites (HCC) [K70.31])    Surgeons: Lidia Green MD Responsible Provider: Mk Godfrey MD    Anesthesia Type: General ASA Status: 4            Anesthesia Type: General    Adelso Phase I:      Adelso Phase II:      Anesthesia Post Evaluation    Patient location during evaluation: PACU  Patient participation: waiting for patient participation  Level of consciousness: awake and lethargic  Pain score: 0  Airway patency: patent  Nausea & Vomiting: no nausea and no vomiting  Cardiovascular status: hemodynamically stable  Respiratory status: acceptable, spontaneous ventilation and face mask  Hydration status: euvolemic  Multimodal analgesia pain management approach  Pain management: adequate    There were no known notable events for this encounter.

## 2025-04-08 NOTE — H&P
never used smokeless tobacco.  ETOH:   reports that he does not currently use alcohol.    Family History:       Problem Relation Age of Onset    Hypertension Brother     Diabetes Other        REVIEW OF SYSTEMS:    Negative except for HPI        PHYSICAL EXAM:      Vitals:    BP (!) 173/85   Pulse 84   Temp 97.2 °F (36.2 °C) (Oral)   Resp 18   Ht 1.727 m (5' 8\")   Wt 114.3 kg (252 lb)   SpO2 95%   BMI 38.32 kg/m²     General Appearance:    Alert, cooperative, no distress, appears stated age   HEENT:    NO anemia, No jaundice , no Cyanosis, Supple neck   Neck:   Supple, symmetrical, trachea midline, no adenopathy;     thyroid:    Lungs:     Clear to auscultation bilaterally, respirations unlabored   Chest Wall:    No tenderness or deformity    Heart:    Regular rate and rhythm, S1 and S2 normal, no murmur, rub   or gallop   Abdomen:     Soft, non-tender, bowel sounds active all four quadrants,     no masses, no organomegaly, no ascitis   Rectal:    Planned at time of C scope   Extremities:   Extremities normal, atraumatic, no cyanosis or edema   Pulses:   2+ and symmetric all extremities   Skin:   Skin color, texture, turgor normal, no rashes or lesions   Lymph nodes:   No abnormality   Neurologic:   No focal deficits, moving all four extremities      ASA Grade:  ASA 4 - Patient with severe systemic disease that is a constant threat to life  Air Way: As per Pre-procedure Anesthesia note.       ASSESSMENT AND PLAN:    1.  Patient is a 79 y.o. male here for EGD/colonoscopy with Anesthesia.   2.  Procedure options, risks and benefits reviewed with patient.  Patient expresses understanding.    Lidia Green MD  4/8/2025  8:23 AM

## 2025-04-08 NOTE — PROGRESS NOTES
1006- pt. Arrived to pacu via cart from OR. Pt. Attached to monitor and alarms are on. Received report from CAROLIN Pascual and JANNETH Corrales. Pt. Is drowsy from anesthesia but responds to verbal stimuli and able to follow commands.pt. denies pain or n/v. Pt. Is on 6L via simple mask. SR on the monitor. IV infusing without any issues.   1030- pt. Aox4. On 2L via NC. SR on the monitor. Denies pain or n/v. Tolerating ice chips.   1045- pt. Aox4. Denies pain or n/v pt. Updated on plan of care and denies any other questions or concerns.

## 2025-04-08 NOTE — DISCHARGE INSTRUCTIONS
will also give you instructions about taking any new medicines.  If you take blood thinners, such as warfarin (Coumadin), clopidogrel (Plavix), or aspirin, be sure to talk to your doctor. He or she will tell you if and when to start taking those medicines again. Make sure that you understand exactly what your doctor wants you to do.  If a biopsy was done during the test, your doctor may tell you not to take aspirin or other anti-inflammatory medicines for a few days. These include ibuprofen (Advil, Motrin) and naproxen (Aleve).  DO NOT DRINK ANYTHING WITH ALCOHOL TODAY.    Other instructions:Anesthesia  For your safety, do not drive or operate machinery for 24 hours.   Do not sign legal documents or make major decisions for 24 hours. The anesthesia can make it hard for you to fully understand what you are agreeing to.      Follow-up care is a key part of your treatment and safety. Be sure to make and go to all appointments, and call your doctor if you are having problems. It's also a good idea to know your test results and keep a list of the medicines you take.  When should you call for help?  [facility] [phone] OR [facility] [phone]  Call 911 anytime you think you may need emergency care. For example, call if:  You passed out (lost consciousness).  You cough up blood.  You vomit blood or what looks like coffee grounds.  You pass maroon or very bloody stools.  Call your doctor now or seek immediate medical care if:  You have trouble swallowing.  You have belly pain.  Your stools are black and tarlike or have streaks of blood.  You are sick to your stomach or cannot keep fluids down.  Watch closely for changes in your health, and be sure to contact your doctor if:  Your throat still hurts after a day or two.  You do not get better as expected.   Where can you learn more?   Go to [email or web address] and sign in to your [employer] account.

## 2025-04-08 NOTE — PROGRESS NOTES
1053 Patient to room from PACU. Report from Juju LAGUNAS.   1119 VSS. Assessment WNL. Patient sleepy but responds appropriately.   1135 Patient sitting on the side of the bed to get dressed. Discharge instructions given to patient. Patient verbalized understanding. Family at bedside Verbalized understanding to discharge instructions. IV removed. All questions answered.

## 2025-04-09 LAB — SURGICAL PATHOLOGY REPORT: NORMAL

## 2025-04-10 ENCOUNTER — HOSPITAL ENCOUNTER (OUTPATIENT)
Dept: INTERVENTIONAL RADIOLOGY/VASCULAR | Age: 80
Discharge: HOME | End: 2025-04-10
Payer: COMMERCIAL

## 2025-04-10 VITALS
SYSTOLIC BLOOD PRESSURE: 124 MMHG | DIASTOLIC BLOOD PRESSURE: 74 MMHG | OXYGEN SATURATION: 92 % | RESPIRATION RATE: 16 BRPM | TEMPERATURE: 98.1 F | HEART RATE: 83 BPM

## 2025-04-10 DIAGNOSIS — K74.60 PROGRESSIVE NEURONAL DEGENERATION WITH LIVER CIRRHOSIS (HCC): ICD-10-CM

## 2025-04-10 DIAGNOSIS — G31.89 PROGRESSIVE NEURONAL DEGENERATION WITH LIVER CIRRHOSIS (HCC): ICD-10-CM

## 2025-04-10 LAB
APPEARANCE FLD: NORMAL
BLASTS NFR FLD: 0 %
BODY FLD TYPE: NORMAL
CLOT CHECK: NORMAL
COLOR FLD: YELLOW
EOSINOPHIL NFR FLD: 0 %
LYMPHOCYTES NFR FLD: 77 %
MESOTHELIAL CELLS BODY FLUID: 1 %
MONOCYTES NFR FLD: 5 %
NEUTROPHILS NFR FLD: 18 %
RBC # FLD: <2000 CELLS/UL
UNIDENT CELLS NFR FLD: 0 %
WBC # FLD: 100 CELLS/UL

## 2025-04-10 PROCEDURE — 6360000002 HC RX W HCPCS: Performed by: RADIOLOGY

## 2025-04-10 PROCEDURE — P9047 ALBUMIN (HUMAN), 25%, 50ML: HCPCS | Performed by: RADIOLOGY

## 2025-04-10 PROCEDURE — 89051 BODY FLUID CELL COUNT: CPT

## 2025-04-10 PROCEDURE — P9047 ALBUMIN (HUMAN), 25%, 50ML: HCPCS

## 2025-04-10 PROCEDURE — 6360000002 HC RX W HCPCS

## 2025-04-10 PROCEDURE — 2709999900 IR US GUIDED PARACENTESIS

## 2025-04-10 PROCEDURE — 49083 ABD PARACENTESIS W/IMAGING: CPT

## 2025-04-10 RX ORDER — ALBUMIN (HUMAN) 12.5 G/50ML
SOLUTION INTRAVENOUS CONTINUOUS PRN
Status: COMPLETED | OUTPATIENT
Start: 2025-04-10 | End: 2025-04-10

## 2025-04-10 RX ORDER — LIDOCAINE HYDROCHLORIDE 10 MG/ML
INJECTION, SOLUTION EPIDURAL; INFILTRATION; INTRACAUDAL; PERINEURAL PRN
Status: COMPLETED | OUTPATIENT
Start: 2025-04-10 | End: 2025-04-10

## 2025-04-10 RX ORDER — LIDOCAINE HYDROCHLORIDE AND EPINEPHRINE BITARTRATE 20; .01 MG/ML; MG/ML
INJECTION, SOLUTION SUBCUTANEOUS PRN
Status: COMPLETED | OUTPATIENT
Start: 2025-04-10 | End: 2025-04-10

## 2025-04-10 RX ADMIN — LIDOCAINE HYDROCHLORIDE,EPINEPHRINE BITARTRATE 10 ML: 20; .01 INJECTION, SOLUTION INFILTRATION; PERINEURAL at 12:27

## 2025-04-10 RX ADMIN — LIDOCAINE HYDROCHLORIDE 7 ML: 10 INJECTION, SOLUTION EPIDURAL; INFILTRATION; INTRACAUDAL; PERINEURAL at 12:27

## 2025-04-10 RX ADMIN — ALBUMIN (HUMAN) 50 G: 0.25 INJECTION, SOLUTION INTRAVENOUS at 12:31

## 2025-04-10 NOTE — OR NURSING
6400 total cloudy yellow fluid drained from abd. 1000 sent to lab. Band aide applied to site on lower right abd.

## 2025-04-12 ENCOUNTER — RESULTS FOLLOW-UP (OUTPATIENT)
Dept: GASTROENTEROLOGY | Age: 80
End: 2025-04-12

## 2025-04-13 ENCOUNTER — RESULTS FOLLOW-UP (OUTPATIENT)
Dept: GASTROENTEROLOGY | Age: 80
End: 2025-04-13

## 2025-04-13 NOTE — RESULT ENCOUNTER NOTE
Gastric biopsies negative for H. pylori.  Cecal polyp with tubular adenoma descending colon polyp with tubular adenoma.  Repeat colonoscopy in 7 years

## 2025-04-14 NOTE — PROGRESS NOTES
4/14/25 -  for patient with times and instructions:    IR Procedure at [facility]:  4/24/25 @ 1130, arrival 1030     Follow your directions as prescribed by the doctor for your procedure and medications.  3.   Consult your provider as to when to stop blood thinner  4.   Do not take any pain medication within 6 hours of your procedure  5.   Do not drink any alcoholic beverages or use any street drugs 24 hours before procedure.  6.   Please wear simple, loose fitting clothing to the hospital.  Do not bring valuables (money,             credit cards, checkbooks, etc.)     7.   If you  have a Living Will and Durable Power of  for Healthcare, please bring in a copy.  8.   Please bring picture ID,  insurance card, paperwork from the doctors office            (H & P, Consent,  & card for implantable devices).  9.   Report to the information desk on the ground floor.  10. Take a shower the night before or morning of your procedure, do not apply any lotion, oil or powder.  11. You will need a responsible adult

## 2025-04-14 NOTE — PROGRESS NOTES
IR Procedure at [facility]:  Left message for patient to arrive at 1030 at [facility] on 4/17/2025 for his procedure at 1130.         Follow your directions as prescribed by the doctor for your procedure and medications.  3.   Consult your provider as to when to stop blood thinner  4.   Do not take any pain medication within 6 hours of your procedure  5.   Do not drink any alcoholic beverages or use any street drugs 24 hours before procedure.  6.   Please wear simple, loose fitting clothing to the hospital.  Do not bring valuables (money,             credit cards, checkbooks, etc.)     7.   If you  have a Living Will and Durable Power of  for Healthcare, please bring in a copy.  8.   Please bring picture ID,  insurance card, paperwork from the doctors office            (H & P, Consent,  & card for implantable devices).  9.   Report to the information desk on the ground floor.  10. Take a shower the night before or morning of your procedure, do not apply any lotion, oil or powder.  11. You will need a responsible adult

## 2025-04-17 ENCOUNTER — HOSPITAL ENCOUNTER (OUTPATIENT)
Dept: INTERVENTIONAL RADIOLOGY/VASCULAR | Age: 80
Discharge: HOME | End: 2025-04-17
Payer: COMMERCIAL

## 2025-04-17 VITALS
SYSTOLIC BLOOD PRESSURE: 125 MMHG | HEART RATE: 72 BPM | DIASTOLIC BLOOD PRESSURE: 67 MMHG | OXYGEN SATURATION: 96 % | TEMPERATURE: 98.1 F | RESPIRATION RATE: 15 BRPM

## 2025-04-17 DIAGNOSIS — K74.60 PROGRESSIVE NEURONAL DEGENERATION WITH LIVER CIRRHOSIS (HCC): ICD-10-CM

## 2025-04-17 DIAGNOSIS — G31.89 PROGRESSIVE NEURONAL DEGENERATION WITH LIVER CIRRHOSIS (HCC): ICD-10-CM

## 2025-04-17 LAB
APPEARANCE FLD: CLEAR
BLASTS NFR FLD: 0 %
BODY FLD TYPE: NORMAL
CLOT CHECK: NORMAL
COLOR FLD: YELLOW
EOSINOPHIL NFR FLD: 0 %
ERYTHROCYTE [DISTWIDTH] IN BLOOD BY AUTOMATED COUNT: 17.4 % (ref 11.7–14.9)
HCT VFR BLD AUTO: 23.7 % (ref 42–52)
HGB BLD-MCNC: 6.9 G/DL (ref 13.5–18)
INR PPP: 1.2
LYMPHOCYTES NFR FLD: 67 %
MCH RBC QN AUTO: 24.2 PG (ref 27–31)
MCHC RBC AUTO-ENTMCNC: 29.1 G/DL (ref 32–36)
MCV RBC AUTO: 83.2 FL (ref 78–100)
MESOTHELIAL CELLS BODY FLUID: 7 %
MONOCYTES NFR FLD: 17 %
NEUTROPHILS NFR FLD: 16 %
PARTIAL THROMBOPLASTIN TIME: 33.2 SEC (ref 25.1–37.1)
PLATELET # BLD AUTO: 182 K/UL (ref 140–440)
PMV BLD AUTO: 10.2 FL (ref 7.5–11.1)
PROTHROMBIN TIME: 15.4 SEC (ref 11.7–14.5)
RBC # BLD AUTO: 2.85 M/UL (ref 4.6–6.2)
RBC # FLD: <2000 CELLS/UL
UNIDENT CELLS NFR FLD: 0 %
WBC # FLD: 86 CELLS/UL
WBC OTHER # BLD: 5.3 K/UL (ref 4–10.5)

## 2025-04-17 PROCEDURE — 6360000002 HC RX W HCPCS: Performed by: RADIOLOGY

## 2025-04-17 PROCEDURE — 2709999900 IR US GUIDED PARACENTESIS

## 2025-04-17 PROCEDURE — 49083 ABD PARACENTESIS W/IMAGING: CPT

## 2025-04-17 PROCEDURE — 6360000002 HC RX W HCPCS: Performed by: INTERNAL MEDICINE

## 2025-04-17 PROCEDURE — 85027 COMPLETE CBC AUTOMATED: CPT

## 2025-04-17 PROCEDURE — 89051 BODY FLUID CELL COUNT: CPT

## 2025-04-17 PROCEDURE — 6360000002 HC RX W HCPCS

## 2025-04-17 PROCEDURE — P9047 ALBUMIN (HUMAN), 25%, 50ML: HCPCS

## 2025-04-17 PROCEDURE — P9047 ALBUMIN (HUMAN), 25%, 50ML: HCPCS | Performed by: INTERNAL MEDICINE

## 2025-04-17 PROCEDURE — 85730 THROMBOPLASTIN TIME PARTIAL: CPT

## 2025-04-17 PROCEDURE — 85610 PROTHROMBIN TIME: CPT

## 2025-04-17 RX ORDER — LIDOCAINE HYDROCHLORIDE 10 MG/ML
INJECTION, SOLUTION EPIDURAL; INFILTRATION; INTRACAUDAL; PERINEURAL PRN
Status: COMPLETED | OUTPATIENT
Start: 2025-04-17 | End: 2025-04-17

## 2025-04-17 RX ORDER — ALBUMIN (HUMAN) 12.5 G/50ML
SOLUTION INTRAVENOUS CONTINUOUS PRN
Status: COMPLETED | OUTPATIENT
Start: 2025-04-17 | End: 2025-04-17

## 2025-04-17 RX ADMIN — ALBUMIN (HUMAN) 50 G: 0.25 INJECTION, SOLUTION INTRAVENOUS at 12:27

## 2025-04-17 RX ADMIN — LIDOCAINE HYDROCHLORIDE 8 ML: 10 INJECTION, SOLUTION EPIDURAL; INFILTRATION; INTRACAUDAL; PERINEURAL at 12:31

## 2025-04-17 NOTE — CONSULTS
depression    Pravastatin     Daptomycin Rash and Other (See Comments)     Hands drawn involuntarily       Medications:  Current Outpatient Medications on File Prior to Encounter   Medication Sig Dispense Refill    carvedilol (COREG) 6.25 MG tablet Take 2 tablets by mouth 2 times daily (with meals) Hold for Heart rate less then 55 and Systolic blood pressure less then 90. 120 tablet 1    DULoxetine (CYMBALTA) 60 MG extended release capsule Take 1 capsule by mouth daily 90 capsule 0    furosemide (LASIX) 40 MG tablet Take 1 tablet by mouth daily 90 tablet 1    levothyroxine (SYNTHROID) 50 MCG tablet Take 1 tablet by mouth daily 90 tablet 1    spironolactone (ALDACTONE) 100 MG tablet Take 1 tablet by mouth 2 times daily 180 tablet 1    ALPRAZolam (XANAX) 0.5 MG tablet Take 1 tablet by mouth 2 times daily as needed for Sleep or Anxiety for up to 90 days. Max Daily Amount: 1 mg 45 tablet 2    lactulose (CONSTULOSE) 10 GM/15ML solution Take 3 times a day (HOLD IF HAVING DIARRHEA) 270 mL 11    albuterol sulfate HFA (PROVENTIL;VENTOLIN;PROAIR) 108 (90 Base) MCG/ACT inhaler INHALE 2 PUFFS BY MOUTH 4 TIMES DAILY AS NEEDED FOR WHEEZING 9 g 0    rifAXIMin (XIFAXAN) 550 MG tablet Take 1 tablet by mouth 2 times daily 180 tablet 3    Multiple Vitamin (MULTI VITAMIN MENS PO) Take 1 tablet by mouth daily      Omega-3 1000 MG CAPS Take 2 capsules by mouth daily       No current facility-administered medications on file prior to encounter.       Review of Systems:  A 10 point review of systems was conducted and was negative with the exception of what is noted above.     Vital Signs:  Vitals:    04/17/25 1149 04/17/25 1209 04/17/25 1219 04/17/25 1226   BP: (!) 145/78 134/72 129/69 125/67   Pulse: 78 71 72 72   Resp: 16 16 15 15   Temp:       TempSrc:       SpO2: 97% 98% 95% 96%        Laboratory:  Recent Labs     04/17/25  1130   WBC 5.3   INR 1.2       Medications reviewed: No contraindications for coagulation or sedation

## 2025-04-17 NOTE — PROGRESS NOTES
TRANSFER - OUT REPORT:    Verbal report given to adrianne/francisco(name) on Boby Glasgow being transferred to ir holding(unit) for routine post-op       Report consisted of patient's Situation, Background, Assessment and   Recommendations(SBAR).     Information from the following report(s) Nurse Handoff Report was reviewed with the receiving nurse.    Opportunity for questions and clarification was provided.      Patient transported with:   Registered Nurse

## 2025-04-17 NOTE — BRIEF OP NOTE
Department of Interventional Radiology Post-Procedure Note      Date: 4/17/2025     Interventional Radiologist: Constantine    Procedure Performed:  paracentesis    H&P Status: H&P was reviewed, the patient was examined and no change has occurred in patient's condition since H&P was completed.    Pre-procedure Diagnosis:  ascites    Post-procedure Diagnosis:  Same    Sedation:  none    Contrast used:  none    Estimated blood loss:  Minimal    Preliminary Findings:  Successful    Complications:  none    Full Report to Follow.    Electronically signed by Jennifer Fitch MD on 4/17/2025 at 12:50 PM

## 2025-04-21 ENCOUNTER — TELEPHONE (OUTPATIENT)
Dept: GASTROENTEROLOGY | Age: 80
End: 2025-04-21

## 2025-04-21 NOTE — TELEPHONE ENCOUNTER
Patient needs follow-up apt to discuss decompensated cirrhosis; he has not been seen in some time.

## 2025-04-23 NOTE — PROGRESS NOTES
IR Procedure at [facility]:  Left message for patient to arrive at 1030 at [facility] on 5/1/2025 for his procedure at 1130. Patient has been given below instructions and will take his medications as scheduled.        Follow your directions as prescribed by the doctor for your procedure and medications.  3.   Consult your provider as to when to stop blood thinner  4.   Do not take any pain medication within 6 hours of your procedure  5.   Do not drink any alcoholic beverages or use any street drugs 24 hours before procedure.  6.   Please wear simple, loose fitting clothing to the hospital.  Do not bring valuables (money,             credit cards, checkbooks, etc.)     7.   If you  have a Living Will and Durable Power of  for Healthcare, please bring in a copy.  8.   Please bring picture ID,  insurance card, paperwork from the doctors office            (H & P, Consent,  & card for implantable devices).  9.   Report to the information desk on the ground floor.  10. Take a shower the night before or morning of your procedure, do not apply any lotion, oil or powder.  11. You will need a responsible adult

## 2025-04-24 ENCOUNTER — HOSPITAL ENCOUNTER (OUTPATIENT)
Dept: INTERVENTIONAL RADIOLOGY/VASCULAR | Age: 80
Discharge: HOME | End: 2025-04-24
Payer: COMMERCIAL

## 2025-04-24 VITALS
HEART RATE: 82 BPM | SYSTOLIC BLOOD PRESSURE: 144 MMHG | DIASTOLIC BLOOD PRESSURE: 84 MMHG | RESPIRATION RATE: 17 BRPM | OXYGEN SATURATION: 94 %

## 2025-04-24 DIAGNOSIS — K74.60 CIRRHOSIS OF LIVER WITH ASCITES, UNSPECIFIED HEPATIC CIRRHOSIS TYPE (HCC): ICD-10-CM

## 2025-04-24 DIAGNOSIS — R18.8 CIRRHOSIS OF LIVER WITH ASCITES, UNSPECIFIED HEPATIC CIRRHOSIS TYPE (HCC): ICD-10-CM

## 2025-04-24 LAB
APPEARANCE FLD: NORMAL
BLASTS NFR FLD: 0 %
BODY FLD TYPE: NORMAL
CLOT CHECK: NORMAL
COLOR FLD: COLORLESS
EOSINOPHIL NFR FLD: 0 %
LYMPHOCYTES NFR FLD: 14 %
MESOTHELIAL CELLS BODY FLUID: 1 %
MONOCYTES NFR FLD: 82 %
NEUTROPHILS NFR FLD: 4 %
RBC # FLD: <2000 CELLS/UL
UNIDENT CELLS NFR FLD: 0 %
WBC # FLD: 89 CELLS/UL

## 2025-04-24 PROCEDURE — 6360000002 HC RX W HCPCS: Performed by: INTERNAL MEDICINE

## 2025-04-24 PROCEDURE — P9047 ALBUMIN (HUMAN), 25%, 50ML: HCPCS

## 2025-04-24 PROCEDURE — 2709999900 IR US GUIDED PARACENTESIS

## 2025-04-24 PROCEDURE — P9047 ALBUMIN (HUMAN), 25%, 50ML: HCPCS | Performed by: INTERNAL MEDICINE

## 2025-04-24 PROCEDURE — 6360000002 HC RX W HCPCS

## 2025-04-24 PROCEDURE — 49083 ABD PARACENTESIS W/IMAGING: CPT

## 2025-04-24 PROCEDURE — 89051 BODY FLUID CELL COUNT: CPT

## 2025-04-24 RX ORDER — ALBUMIN (HUMAN) 12.5 G/50ML
SOLUTION INTRAVENOUS CONTINUOUS PRN
Status: COMPLETED | OUTPATIENT
Start: 2025-04-24 | End: 2025-04-24

## 2025-04-24 RX ADMIN — ALBUMIN (HUMAN) 50 G: 0.25 INJECTION, SOLUTION INTRAVENOUS at 13:24

## 2025-04-24 NOTE — PROGRESS NOTES
Pt arrived for scheduled procedure.  Vitals WNL.  Pt comfortable at this time.  IV obtained. Labs up to date.

## 2025-04-29 NOTE — PROGRESS NOTES
IR Procedure at [facility]:  Left message for patient to arrive at 1030 at [facility] on 5/8/2025 for his procedure at 1130. Patient has been given below instructions and will take his medications as scheduled.    NPO at Midnight      Follow your directions as prescribed by the doctor for your procedure and medications.  3.   Consult your provider as to when to stop blood thinner  4.   Do not take any pain medication within 6 hours of your procedure  5.   Do not drink any alcoholic beverages or use any street drugs 24 hours before procedure.  6.   Please wear simple, loose fitting clothing to the hospital.  Do not bring valuables (money,             credit cards, checkbooks, etc.)     7.   If you  have a Living Will and Durable Power of  for Healthcare, please bring in a copy.  8.   Please bring picture ID,  insurance card, paperwork from the doctors office            (H & P, Consent,  & card for implantable devices).  9.   Report to the information desk on the ground floor.  10. Take a shower the night before or morning of your procedure, do not apply any lotion, oil or powder.  11. You will need a responsible adult

## 2025-05-01 ENCOUNTER — HOSPITAL ENCOUNTER (OUTPATIENT)
Dept: INTERVENTIONAL RADIOLOGY/VASCULAR | Age: 80
Discharge: HOME | End: 2025-05-01
Payer: COMMERCIAL

## 2025-05-01 VITALS
SYSTOLIC BLOOD PRESSURE: 122 MMHG | HEART RATE: 72 BPM | RESPIRATION RATE: 15 BRPM | OXYGEN SATURATION: 97 % | DIASTOLIC BLOOD PRESSURE: 70 MMHG

## 2025-05-01 DIAGNOSIS — R18.8 PANCREATIC ASCITES: ICD-10-CM

## 2025-05-01 DIAGNOSIS — K72.90: ICD-10-CM

## 2025-05-01 DIAGNOSIS — G31.89 PROGRESSIVE NEURONAL DEGENERATION WITH LIVER CIRRHOSIS (HCC): ICD-10-CM

## 2025-05-01 DIAGNOSIS — K74.60 PROGRESSIVE NEURONAL DEGENERATION WITH LIVER CIRRHOSIS (HCC): ICD-10-CM

## 2025-05-01 LAB
APPEARANCE FLD: NORMAL
BODY FLD TYPE: NORMAL
CLOT CHECK: NORMAL
COLOR FLD: YELLOW
MONOCYTES NFR FLD: 84 %
NEUTROPHILS NFR FLD: 16 %
RBC # FLD: <2000 CELLS/UL
WBC # FLD: 102 CELLS/UL

## 2025-05-01 PROCEDURE — P9047 ALBUMIN (HUMAN), 25%, 50ML: HCPCS

## 2025-05-01 PROCEDURE — 49083 ABD PARACENTESIS W/IMAGING: CPT

## 2025-05-01 PROCEDURE — P9047 ALBUMIN (HUMAN), 25%, 50ML: HCPCS | Performed by: INTERNAL MEDICINE

## 2025-05-01 PROCEDURE — 89051 BODY FLUID CELL COUNT: CPT

## 2025-05-01 PROCEDURE — 6360000002 HC RX W HCPCS

## 2025-05-01 PROCEDURE — 6360000002 HC RX W HCPCS: Performed by: INTERNAL MEDICINE

## 2025-05-01 PROCEDURE — C1729 CATH, DRAINAGE: HCPCS

## 2025-05-01 RX ORDER — ALBUMIN (HUMAN) 12.5 G/50ML
SOLUTION INTRAVENOUS CONTINUOUS PRN
Status: COMPLETED | OUTPATIENT
Start: 2025-05-01 | End: 2025-05-01

## 2025-05-01 RX ADMIN — ALBUMIN (HUMAN) 50 G: 0.25 INJECTION, SOLUTION INTRAVENOUS at 13:39

## 2025-05-01 NOTE — PROGRESS NOTES
Pt arrived to IR holding via wheelchair. BP obtained. Peripheral IV started in left AC. Consent in chart. Pt tolerated well.

## 2025-05-01 NOTE — BRIEF OP NOTE
Department of Interventional Radiology Post-Procedure Note      Date: 5/1/2025     Interventional Radiologist: Constantine    Procedure Performed:  paracentesis    H&P Status: H&P was reviewed, the patient was examined and no change has occurred in patient's condition since H&P was completed.    Pre-procedure Diagnosis:  ascites    Post-procedure Diagnosis:  Same    Sedation:  none    Contrast used:  none    Estimated blood loss:  Minimal    Preliminary Findings:  Successful    Complications:  none    Full Report to Follow.    Electronically signed by Jennifer Fitch MD on 5/1/2025 at 3:50 PM

## 2025-05-01 NOTE — CONSULTS
depression    Pravastatin     Daptomycin Rash and Other (See Comments)     Hands drawn involuntarily       Medications:  Current Outpatient Medications on File Prior to Encounter   Medication Sig Dispense Refill    carvedilol (COREG) 6.25 MG tablet Take 2 tablets by mouth 2 times daily (with meals) Hold for Heart rate less then 55 and Systolic blood pressure less then 90. 120 tablet 1    DULoxetine (CYMBALTA) 60 MG extended release capsule Take 1 capsule by mouth daily 90 capsule 0    furosemide (LASIX) 40 MG tablet Take 1 tablet by mouth daily 90 tablet 1    levothyroxine (SYNTHROID) 50 MCG tablet Take 1 tablet by mouth daily 90 tablet 1    spironolactone (ALDACTONE) 100 MG tablet Take 1 tablet by mouth 2 times daily 180 tablet 1    ALPRAZolam (XANAX) 0.5 MG tablet Take 1 tablet by mouth 2 times daily as needed for Sleep or Anxiety for up to 90 days. Max Daily Amount: 1 mg 45 tablet 2    lactulose (CONSTULOSE) 10 GM/15ML solution Take 3 times a day (HOLD IF HAVING DIARRHEA) 270 mL 11    albuterol sulfate HFA (PROVENTIL;VENTOLIN;PROAIR) 108 (90 Base) MCG/ACT inhaler INHALE 2 PUFFS BY MOUTH 4 TIMES DAILY AS NEEDED FOR WHEEZING 9 g 0    rifAXIMin (XIFAXAN) 550 MG tablet Take 1 tablet by mouth 2 times daily 180 tablet 3    Multiple Vitamin (MULTI VITAMIN MENS PO) Take 1 tablet by mouth daily      Omega-3 1000 MG CAPS Take 2 capsules by mouth daily       No current facility-administered medications on file prior to encounter.       Review of Systems:  A 10 point review of systems was conducted and was negative with the exception of what is noted above.     Vital Signs:  Vitals:    05/01/25 1311 05/01/25 1325 05/01/25 1335 05/01/25 1415   BP: (!) 143/67 139/77 128/66 122/70   Pulse: 77 72 75 72   Resp: 16 16 16 15   SpO2: 95% 94% 96% 97%        Laboratory:  No results for input(s): \"WBC\", \"NA\", \"POTASSIUM\", \"CL\", \"CO2\", \"BUN\", \"CREATININE\", \"GLUCOSE\", \"INR\", \"CKMB\" in the last 72 hours.    Invalid input(s): \"HEMOGLOBIN\",

## 2025-05-02 NOTE — PROGRESS NOTES
Times and instructions reviewed with Margret MCLEOD Procedure at [facility]:  5/15/25 @ 1130, arrival 1030     You can eat a light breakfast   Follow your directions as prescribed by the doctor for your procedure and medications.  3.   Consult your provider as to when to stop blood thinner  4.   Do not take any pain medication within 6 hours of your procedure  5.   Do not drink any alcoholic beverages or use any street drugs 24 hours before procedure.  6.   Please wear simple, loose fitting clothing to the hospital.  Do not bring valuables (money,             credit cards, checkbooks, etc.)     7.   If you  have a Living Will and Durable Power of  for Healthcare, please bring in a copy.  8.   Please bring picture ID,  insurance card, paperwork from the doctors office            (H & P, Consent,  & card for implantable devices).  9.   Report to the information desk on the ground floor.  10. Take a shower the night before or morning of your procedure, do not apply any lotion, oil or powder.

## 2025-05-08 ENCOUNTER — HOSPITAL ENCOUNTER (OUTPATIENT)
Dept: INTERVENTIONAL RADIOLOGY/VASCULAR | Age: 80
Discharge: HOME | End: 2025-05-08
Payer: COMMERCIAL

## 2025-05-08 VITALS
OXYGEN SATURATION: 99 % | HEART RATE: 84 BPM | RESPIRATION RATE: 16 BRPM | DIASTOLIC BLOOD PRESSURE: 71 MMHG | SYSTOLIC BLOOD PRESSURE: 161 MMHG

## 2025-05-08 DIAGNOSIS — K74.60 CIRRHOSIS OF LIVER WITH ASCITES, UNSPECIFIED HEPATIC CIRRHOSIS TYPE (HCC): ICD-10-CM

## 2025-05-08 DIAGNOSIS — R18.8 CIRRHOSIS OF LIVER WITH ASCITES, UNSPECIFIED HEPATIC CIRRHOSIS TYPE (HCC): ICD-10-CM

## 2025-05-08 LAB
APPEARANCE FLD: NORMAL
BLASTS NFR FLD: 0 %
BODY FLD TYPE: NORMAL
CLOT CHECK: NORMAL
COLOR FLD: YELLOW
EOSINOPHIL NFR FLD: 0 %
LYMPHOCYTES NFR FLD: 40 %
MESOTHELIAL CELLS BODY FLUID: 13 %
MONOCYTES NFR FLD: 26 %
NEUTROPHILS NFR FLD: 34 %
RBC # FLD: <2000 CELLS/UL
UNIDENT CELLS NFR FLD: 0 %
WBC # FLD: 121 CELLS/UL

## 2025-05-08 PROCEDURE — 87070 CULTURE OTHR SPECIMN AEROBIC: CPT

## 2025-05-08 PROCEDURE — 2709999900 IR US GUIDED PARACENTESIS

## 2025-05-08 PROCEDURE — 89051 BODY FLUID CELL COUNT: CPT

## 2025-05-08 PROCEDURE — 6360000002 HC RX W HCPCS

## 2025-05-08 PROCEDURE — 49083 ABD PARACENTESIS W/IMAGING: CPT

## 2025-05-08 PROCEDURE — 87075 CULTR BACTERIA EXCEPT BLOOD: CPT

## 2025-05-08 PROCEDURE — 87205 SMEAR GRAM STAIN: CPT

## 2025-05-08 PROCEDURE — 6360000002 HC RX W HCPCS: Performed by: RADIOLOGY

## 2025-05-08 PROCEDURE — P9047 ALBUMIN (HUMAN), 25%, 50ML: HCPCS

## 2025-05-08 PROCEDURE — P9047 ALBUMIN (HUMAN), 25%, 50ML: HCPCS | Performed by: RADIOLOGY

## 2025-05-08 RX ORDER — LIDOCAINE HYDROCHLORIDE 10 MG/ML
INJECTION, SOLUTION EPIDURAL; INFILTRATION; INTRACAUDAL; PERINEURAL PRN
Status: COMPLETED | OUTPATIENT
Start: 2025-05-08 | End: 2025-05-08

## 2025-05-08 RX ORDER — ALBUMIN (HUMAN) 12.5 G/50ML
SOLUTION INTRAVENOUS CONTINUOUS PRN
Status: COMPLETED | OUTPATIENT
Start: 2025-05-08 | End: 2025-05-08

## 2025-05-08 RX ADMIN — ALBUMIN (HUMAN) 37.5 G: 0.25 INJECTION, SOLUTION INTRAVENOUS at 12:44

## 2025-05-08 RX ADMIN — LIDOCAINE HYDROCHLORIDE 10 ML: 10 INJECTION, SOLUTION EPIDURAL; INFILTRATION; INTRACAUDAL; PERINEURAL at 12:21

## 2025-05-08 NOTE — PROGRESS NOTES
TRANSFER - OUT REPORT:    Verbal report given to Cammy RN and Marj Rn(name) on Boby Glasgow being transferred to IR holding for routine post-op       Report consisted of patient's Situation, Background, Assessment and   Recommendations(SBAR).     Information from the following report(s) Nurse Handoff Report was reviewed with the receiving nurse.    Opportunity for questions and clarification was provided.      Patient transported with:   Registered Nurse    Successful paracentesis performed. 5400cc of cloudy yellow fluid was drained. 1000cc was sent to the lab. 37.5g of albumin was given

## 2025-05-08 NOTE — CONSULTS
depression    Pravastatin     Daptomycin Rash and Other (See Comments)     Hands drawn involuntarily       Medications:  Current Outpatient Medications on File Prior to Encounter   Medication Sig Dispense Refill    carvedilol (COREG) 6.25 MG tablet Take 2 tablets by mouth 2 times daily (with meals) Hold for Heart rate less then 55 and Systolic blood pressure less then 90. 120 tablet 1    DULoxetine (CYMBALTA) 60 MG extended release capsule Take 1 capsule by mouth daily 90 capsule 0    furosemide (LASIX) 40 MG tablet Take 1 tablet by mouth daily 90 tablet 1    levothyroxine (SYNTHROID) 50 MCG tablet Take 1 tablet by mouth daily 90 tablet 1    spironolactone (ALDACTONE) 100 MG tablet Take 1 tablet by mouth 2 times daily 180 tablet 1    ALPRAZolam (XANAX) 0.5 MG tablet Take 1 tablet by mouth 2 times daily as needed for Sleep or Anxiety for up to 90 days. Max Daily Amount: 1 mg 45 tablet 2    lactulose (CONSTULOSE) 10 GM/15ML solution Take 3 times a day (HOLD IF HAVING DIARRHEA) 270 mL 11    albuterol sulfate HFA (PROVENTIL;VENTOLIN;PROAIR) 108 (90 Base) MCG/ACT inhaler INHALE 2 PUFFS BY MOUTH 4 TIMES DAILY AS NEEDED FOR WHEEZING 9 g 0    rifAXIMin (XIFAXAN) 550 MG tablet Take 1 tablet by mouth 2 times daily 180 tablet 3    Multiple Vitamin (MULTI VITAMIN MENS PO) Take 1 tablet by mouth daily      Omega-3 1000 MG CAPS Take 2 capsules by mouth daily       No current facility-administered medications on file prior to encounter.       Review of Systems:  A 10 point review of systems was conducted and was negative with the exception of what is noted above.     Vital Signs:  Vitals:    05/08/25 1221 05/08/25 1231 05/08/25 1245 05/08/25 1252   BP: (!) 162/78 (!) 172/71  (!) 161/71   Pulse: 82 76 85 84   Resp: 16 18 18 16   SpO2: 97% 96% 97% 99%        Laboratory:  No results for input(s): \"WBC\", \"NA\", \"POTASSIUM\", \"CL\", \"CO2\", \"BUN\", \"CREATININE\", \"GLUCOSE\", \"INR\", \"CKMB\" in the last 72 hours.    Invalid input(s):

## 2025-05-08 NOTE — BRIEF OP NOTE
Department of Interventional Radiology Post-Procedure Note      Date: 5/8/2025     Interventional Radiologist: Constantine    Procedure Performed:  paracentesis    H&P Status: H&P was reviewed, the patient was examined and no change has occurred in patient's condition since H&P was completed.    Pre-procedure Diagnosis:  ascites    Post-procedure Diagnosis:  Same    Sedation:  none    Contrast used:  none    Estimated blood loss:  Minimal    Preliminary Findings:  Successful    Complications:  none    Full Report to Follow.    Electronically signed by Jennifer Fitch MD on 5/8/2025 at 4:13 PM

## 2025-05-13 ENCOUNTER — RESULTS FOLLOW-UP (OUTPATIENT)
Dept: GASTROENTEROLOGY | Age: 80
End: 2025-05-13

## 2025-05-15 ENCOUNTER — HOSPITAL ENCOUNTER (OUTPATIENT)
Dept: INTERVENTIONAL RADIOLOGY/VASCULAR | Age: 80
Discharge: HOME | End: 2025-05-15
Payer: COMMERCIAL

## 2025-05-15 ENCOUNTER — HOSPITAL ENCOUNTER (OUTPATIENT)
Age: 80
Setting detail: SPECIMEN
Discharge: HOME | End: 2025-05-15
Payer: MEDICARE

## 2025-05-15 VITALS
OXYGEN SATURATION: 98 % | RESPIRATION RATE: 16 BRPM | HEART RATE: 89 BPM | SYSTOLIC BLOOD PRESSURE: 167 MMHG | DIASTOLIC BLOOD PRESSURE: 87 MMHG

## 2025-05-15 DIAGNOSIS — R18.8 CIRRHOSIS OF LIVER WITH ASCITES, UNSPECIFIED HEPATIC CIRRHOSIS TYPE (HCC): ICD-10-CM

## 2025-05-15 DIAGNOSIS — K74.60 CIRRHOSIS OF LIVER WITH ASCITES, UNSPECIFIED HEPATIC CIRRHOSIS TYPE (HCC): ICD-10-CM

## 2025-05-15 LAB
ALBUMIN FLD-MCNC: 0.8 G/DL
AMYLASE FLD-CCNC: 14 U/L
APPEARANCE FLD: ABNORMAL
BLASTS NFR FLD: 0 %
BODY FLD TYPE: ABNORMAL
CLOT CHECK: ABNORMAL
COLOR FLD: YELLOW
EOSINOPHIL NFR FLD: 1 %
LYMPHOCYTES NFR FLD: 65 %
MESOTHELIAL CELLS BODY FLUID: 3 %
MONOCYTES NFR FLD: 31 %
NEUTROPHILS NFR FLD: 3 %
RBC # FLD: ABNORMAL CELLS/UL
SPECIMEN TYPE: NORMAL
SPECIMEN TYPE: NORMAL
UNIDENT CELLS NFR FLD: 0 %
WBC # FLD: 117 CELLS/UL

## 2025-05-15 PROCEDURE — 87075 CULTR BACTERIA EXCEPT BLOOD: CPT

## 2025-05-15 PROCEDURE — 88305 TISSUE EXAM BY PATHOLOGIST: CPT

## 2025-05-15 PROCEDURE — P9047 ALBUMIN (HUMAN), 25%, 50ML: HCPCS

## 2025-05-15 PROCEDURE — 49083 ABD PARACENTESIS W/IMAGING: CPT

## 2025-05-15 PROCEDURE — 6360000002 HC RX W HCPCS: Performed by: SPECIALIST

## 2025-05-15 PROCEDURE — 82150 ASSAY OF AMYLASE: CPT

## 2025-05-15 PROCEDURE — 2709999900 IR US GUIDED PARACENTESIS

## 2025-05-15 PROCEDURE — 88108 CYTOPATH CONCENTRATE TECH: CPT

## 2025-05-15 PROCEDURE — 87070 CULTURE OTHR SPECIMN AEROBIC: CPT

## 2025-05-15 PROCEDURE — 87205 SMEAR GRAM STAIN: CPT

## 2025-05-15 PROCEDURE — P9047 ALBUMIN (HUMAN), 25%, 50ML: HCPCS | Performed by: SPECIALIST

## 2025-05-15 PROCEDURE — 89051 BODY FLUID CELL COUNT: CPT

## 2025-05-15 PROCEDURE — 6360000002 HC RX W HCPCS: Performed by: STUDENT IN AN ORGANIZED HEALTH CARE EDUCATION/TRAINING PROGRAM

## 2025-05-15 PROCEDURE — 82042 OTHER SOURCE ALBUMIN QUAN EA: CPT

## 2025-05-15 PROCEDURE — 6360000002 HC RX W HCPCS

## 2025-05-15 RX ORDER — ALBUMIN (HUMAN) 12.5 G/50ML
SOLUTION INTRAVENOUS CONTINUOUS PRN
Status: COMPLETED | OUTPATIENT
Start: 2025-05-15 | End: 2025-05-15

## 2025-05-15 RX ORDER — LIDOCAINE HYDROCHLORIDE 10 MG/ML
INJECTION, SOLUTION EPIDURAL; INFILTRATION; INTRACAUDAL; PERINEURAL PRN
Status: COMPLETED | OUTPATIENT
Start: 2025-05-15 | End: 2025-05-15

## 2025-05-15 RX ADMIN — ALBUMIN (HUMAN) 12.5 G: 0.25 INJECTION, SOLUTION INTRAVENOUS at 12:13

## 2025-05-15 RX ADMIN — LIDOCAINE HYDROCHLORIDE 7 ML: 10 INJECTION, SOLUTION EPIDURAL; INFILTRATION; INTRACAUDAL; PERINEURAL at 11:52

## 2025-05-15 RX ADMIN — ALBUMIN (HUMAN) 12.5 G: 0.25 INJECTION, SOLUTION INTRAVENOUS at 12:31

## 2025-05-15 NOTE — BRIEF OP NOTE
Department of Interventional Radiology Post-Procedure Note      Date: 5/15/2025     Interventional Radiologist: Clinton Razo    Procedure Performed:  Paracentesis    H&P Status: H&P was reviewed, the patient was examined and no change has occurred in patient's condition since H&P was completed.    Pre-procedure Diagnosis:  Ascites    Post-procedure Diagnosis:  Same    Estimated blood loss:  Minimal    Preliminary Findings:  Ascites    Complications:  none    Full Report to Follow.    Electronically signed by Clinton Razo MD on 5/15/2025 at 12:59 PM

## 2025-05-15 NOTE — PROGRESS NOTES
Discharge education complete with patient, VSS, denies pain, dressing clean dry and intact. Denies further questions.

## 2025-05-15 NOTE — PROGRESS NOTES
TRANSFER - OUT REPORT:    Verbal report given to ROBERT LAGUNAS on Boby Glasgow being transferred to [facility]  for routine post-op       Report consisted of patient's Situation, Background, Assessment and   Recommendations(SBAR).     Information from the following report(s) Nurse Handoff Report was reviewed with the receiving nurse.    Opportunity for questions and clarification was provided.      Patient transported with:   Registered Nurse

## 2025-05-15 NOTE — PRE SEDATION
Pre-Procedure Note    Patient Name: Boby Glasgow   YOB: 1945  Room/Bed: Room/bed info not found  Medical Record Number: [id number]  Date: 5/15/2025   Time: 12:58 PM       Indication:  ascites    Consent: I have discussed with the patient and/or the patient representative the indication, alternatives, and the possible risks and/or complications of the planned procedure and the anesthesia methods. The patient and/or patient representative appear to understand and agree to proceed.    Vital Signs:   Vitals:    05/15/25 1245   BP: (!) 167/87   Pulse: 89   Resp: 16   SpO2: 98%       Past Medical History:   has a past medical history of Acute bacterial endocarditis, Anxiety, Chronic heart failure, unspecified heart failure type (HCC), Cirrhosis, alcoholic (HCC), Diabetes mellitus (HCC), GERD (gastroesophageal reflux disease), Hyperlipidemia, Hypertension, Meniere's disease, Portal hypertension (HCC), Pulmonary nodules, Secondary esophageal varices without bleeding (HCC), Sleep apnea, SOB (shortness of breath), and Thyroid disease.    Past Surgical History:   has a past surgical history that includes Cholecystectomy; Vasectomy; Colonoscopy; Endoscopy, colon, diagnostic; Foot surgery; Upper gastrointestinal endoscopy (N/A, 01/19/2022); Tonsillectomy; Appendectomy; back surgery; Colonoscopy (N/A, 4/8/2025); and Upper gastrointestinal endoscopy (N/A, 4/8/2025).    Medications:   Scheduled Meds:   Continuous Infusions:   PRN Meds:   Home Meds:   Prior to Admission medications    Medication Sig Start Date End Date Taking? Authorizing Provider   carvedilol (COREG) 6.25 MG tablet Take 2 tablets by mouth 2 times daily (with meals) Hold for Heart rate less then 55 and Systolic blood pressure less then 90. 4/8/25  Yes Lidia Green MD   DULoxetine (CYMBALTA) 60 MG extended release capsule Take 1 capsule by mouth daily 4/2/25  Yes Kehinde Mireles MD   furosemide (LASIX) 40 MG tablet Take 1 tablet by mouth

## 2025-05-19 LAB — NON-GYN CYTOLOGY REPORT: NORMAL

## 2025-05-19 NOTE — PROGRESS NOTES
.IR Procedure at [facility]:  5/22/25 @ 1130, arrival 1030        Follow your directions as prescribed by the doctor for your procedure and medications.  3.   Consult your provider as to when to stop blood thinner  4.   Do not take any pain medication within 6 hours of your procedure  5.   Do not drink any alcoholic beverages or use any street drugs 24 hours before procedure.  6.   Please wear simple, loose fitting clothing to the hospital.  Do not bring valuables (money,             credit cards, checkbooks, etc.)     7.   If you  have a Living Will and Durable Power of  for Healthcare, please bring in a copy.  8.   Please bring picture ID,  insurance card, paperwork from the doctors office            (H & P, Consent,  & card for implantable devices).  9.   Report to the information desk on the ground floor.  10. Take a shower the night before or morning of your procedure, do not apply any lotion, oil or powder.  11. You will need a responsible adult

## 2025-05-22 ENCOUNTER — HOSPITAL ENCOUNTER (OUTPATIENT)
Dept: INTERVENTIONAL RADIOLOGY/VASCULAR | Age: 80
Discharge: HOME | End: 2025-05-22
Payer: COMMERCIAL

## 2025-05-22 ENCOUNTER — RESULTS FOLLOW-UP (OUTPATIENT)
Dept: GASTROENTEROLOGY | Age: 80
End: 2025-05-22

## 2025-05-22 VITALS
SYSTOLIC BLOOD PRESSURE: 168 MMHG | OXYGEN SATURATION: 98 % | DIASTOLIC BLOOD PRESSURE: 73 MMHG | RESPIRATION RATE: 16 BRPM | HEART RATE: 89 BPM

## 2025-05-22 DIAGNOSIS — R18.8 CIRRHOSIS OF LIVER WITH ASCITES, UNSPECIFIED HEPATIC CIRRHOSIS TYPE (HCC): ICD-10-CM

## 2025-05-22 DIAGNOSIS — K74.60 CIRRHOSIS OF LIVER WITH ASCITES, UNSPECIFIED HEPATIC CIRRHOSIS TYPE (HCC): ICD-10-CM

## 2025-05-22 LAB
APPEARANCE FLD: NORMAL
BLASTS NFR FLD: 0 %
BODY FLD TYPE: NORMAL
CLOT CHECK: NORMAL
COLOR FLD: YELLOW
EOSINOPHIL NFR FLD: 0 %
LYMPHOCYTES NFR FLD: 54 %
MESOTHELIAL CELLS BODY FLUID: 21 %
MONOCYTES NFR FLD: 25 %
NEUTROPHILS NFR FLD: 21 %
RBC # FLD: 2000 CELLS/UL
UNIDENT CELLS NFR FLD: 0 %
WBC # FLD: 123 CELLS/UL

## 2025-05-22 PROCEDURE — 87205 SMEAR GRAM STAIN: CPT

## 2025-05-22 PROCEDURE — C1729 CATH, DRAINAGE: HCPCS

## 2025-05-22 PROCEDURE — 87075 CULTR BACTERIA EXCEPT BLOOD: CPT

## 2025-05-22 PROCEDURE — 6360000002 HC RX W HCPCS

## 2025-05-22 PROCEDURE — 6360000002 HC RX W HCPCS: Performed by: RADIOLOGY

## 2025-05-22 PROCEDURE — P9047 ALBUMIN (HUMAN), 25%, 50ML: HCPCS

## 2025-05-22 PROCEDURE — 87070 CULTURE OTHR SPECIMN AEROBIC: CPT

## 2025-05-22 PROCEDURE — P9047 ALBUMIN (HUMAN), 25%, 50ML: HCPCS | Performed by: RADIOLOGY

## 2025-05-22 PROCEDURE — 49083 ABD PARACENTESIS W/IMAGING: CPT

## 2025-05-22 PROCEDURE — 89051 BODY FLUID CELL COUNT: CPT

## 2025-05-22 RX ORDER — ALBUMIN (HUMAN) 12.5 G/50ML
SOLUTION INTRAVENOUS CONTINUOUS PRN
Status: COMPLETED | OUTPATIENT
Start: 2025-05-22 | End: 2025-05-22

## 2025-05-22 RX ORDER — LIDOCAINE HYDROCHLORIDE 10 MG/ML
INJECTION, SOLUTION EPIDURAL; INFILTRATION; INTRACAUDAL; PERINEURAL PRN
Status: COMPLETED | OUTPATIENT
Start: 2025-05-22 | End: 2025-05-22

## 2025-05-22 RX ADMIN — LIDOCAINE HYDROCHLORIDE 13 ML: 10 INJECTION, SOLUTION EPIDURAL; INFILTRATION; INTRACAUDAL; PERINEURAL at 11:21

## 2025-05-22 RX ADMIN — ALBUMIN (HUMAN) 50 G: 0.25 INJECTION, SOLUTION INTRAVENOUS at 11:52

## 2025-05-22 NOTE — PROGRESS NOTES
TRANSFER - OUT REPORT:    Verbal report given to Natalia LAGUNAS on Boby Glasgow being transferred to IR holding for routine post-op       Report consisted of patient's Situation, Background, Assessment and   Recommendations(SBAR).     Information from the following report(s) Nurse Handoff Report was reviewed with the receiving nurse.    Opportunity for questions and clarification was provided.      Patient transported with:   Registered Nurse    Successful paracentesis performed. 7000cc of cloudy yellow fluid was drained. 1000cc was sent to the lab. 50g of albumin was given

## 2025-05-22 NOTE — BRIEF OP NOTE
Department of Interventional Radiology Post-Procedure Note      Date: 5/22/2025     Interventional Radiologist: Constantine    Procedure Performed:  paracentesis    H&P Status: H&P was reviewed, the patient was examined and no change has occurred in patient's condition since H&P was completed.    Pre-procedure Diagnosis:  Ascites    Post-procedure Diagnosis:  SAme    Sedation:  none    Contrast used:  none    Estimated blood loss:  Minimal    Preliminary Findings:  Successful    Complications:  none    Full Report to Follow.    Electronically signed by Jennifer Fitch MD on 5/22/2025 at 2:18 PM

## 2025-05-22 NOTE — CONSULTS
education: Not on file    Highest education level: Not on file   Occupational History     Comment: Retired    Tobacco Use    Smoking status: Former     Current packs/day: 0.00     Average packs/day: 1 pack/day for 14.0 years (14.0 ttl pk-yrs)     Types: Cigarettes     Start date:      Quit date:      Years since quittin.4    Smokeless tobacco: Never   Vaping Use    Vaping status: Never Used   Substance and Sexual Activity    Alcohol use: Not Currently    Drug use: Never    Sexual activity: Yes     Partners: Female   Other Topics Concern    Not on file   Social History Narrative    Not on file     Social Drivers of Health     Financial Resource Strain: Low Risk  (2024)    Overall Financial Resource Strain (CARDIA)     Difficulty of Paying Living Expenses: Not hard at all   Food Insecurity: No Food Insecurity (2025)    Hunger Vital Sign     Worried About Running Out of Food in the Last Year: Never true     Ran Out of Food in the Last Year: Never true   Transportation Needs: No Transportation Needs (2025)    PRAPARE - Transportation     Lack of Transportation (Medical): No     Lack of Transportation (Non-Medical): No   Physical Activity: Not on file   Stress: Not on file   Social Connections: Not on file   Intimate Partner Violence: Not on file   Housing Stability: Low Risk  (2025)    Housing Stability Vital Sign     Unable to Pay for Housing in the Last Year: No     Number of Times Moved in the Last Year: 0     Homeless in the Last Year: No       Family History:  Family History   Problem Relation Age of Onset    Hypertension Brother     Diabetes Other        Allergies:  Allergies   Allergen Reactions    Lisinopril Swelling     Tongue swelling      Zetia [Ezetimibe] Swelling     Facial swelling    Atorvastatin     Codeine Other (See Comments)     Blood pressure drops    Hydrochlorothiazide     Hydroxyzine      Fatigue and depressed    Lexapro [Escitalopram Oxalate]      Increased

## 2025-05-27 LAB — NON-GYN CYTOLOGY REPORT: NORMAL

## 2025-05-27 NOTE — PROGRESS NOTES
.IR Procedure at [facility]:  5/29/25 @ 1130, arrival 1030       Follow your directions as prescribed by the doctor for your procedure and medications.  3.   Consult your provider as to when to stop blood thinner  4.   Do not take any pain medication within 6 hours of your procedure  5.   Do not drink any alcoholic beverages or use any street drugs 24 hours before procedure.  6.   Please wear simple, loose fitting clothing to the hospital.  Do not bring valuables (money,             credit cards, checkbooks, etc.)     7.   If you  have a Living Will and Durable Power of  for Healthcare, please bring in a copy.  8.   Please bring picture ID,  insurance card, paperwork from the doctors office            (H & P, Consent,  & card for implantable devices).  9.   Report to the information desk on the ground floor.  10. Take a shower the night before or morning of your procedure, do not apply any lotion, oil or powder.  11. You will need a responsible adult

## 2025-05-29 ENCOUNTER — HOSPITAL ENCOUNTER (OUTPATIENT)
Dept: INTERVENTIONAL RADIOLOGY/VASCULAR | Age: 80
Discharge: HOME | End: 2025-05-29
Payer: COMMERCIAL

## 2025-05-29 ENCOUNTER — RESULTS FOLLOW-UP (OUTPATIENT)
Dept: GASTROENTEROLOGY | Age: 80
End: 2025-05-29

## 2025-05-29 VITALS — DIASTOLIC BLOOD PRESSURE: 64 MMHG | SYSTOLIC BLOOD PRESSURE: 140 MMHG | OXYGEN SATURATION: 95 % | HEART RATE: 74 BPM

## 2025-05-29 DIAGNOSIS — K74.60 CIRRHOSIS OF LIVER WITH ASCITES, UNSPECIFIED HEPATIC CIRRHOSIS TYPE (HCC): ICD-10-CM

## 2025-05-29 DIAGNOSIS — R18.8 CIRRHOSIS OF LIVER WITH ASCITES, UNSPECIFIED HEPATIC CIRRHOSIS TYPE (HCC): ICD-10-CM

## 2025-05-29 LAB
ALBUMIN FLD-MCNC: 0.9 G/DL
AMYLASE FLD-CCNC: 15 U/L
APPEARANCE FLD: ABNORMAL
BLASTS NFR FLD: 0 %
BODY FLD TYPE: ABNORMAL
CLOT CHECK: ABNORMAL
COLOR FLD: YELLOW
EOSINOPHIL NFR FLD: 1 %
ERYTHROCYTE [DISTWIDTH] IN BLOOD BY AUTOMATED COUNT: 18.2 % (ref 11.7–14.9)
GLUCOSE FLD-MCNC: 112 MG/DL
HCT VFR BLD AUTO: 23.7 % (ref 42–52)
HGB BLD-MCNC: 7.1 G/DL (ref 13.5–18)
INR PPP: 1.2
LDH FLD L TO P-CCNC: 52 U/L
LYMPHOCYTES NFR FLD: 36 %
MCH RBC QN AUTO: 23.4 PG (ref 27–31)
MCHC RBC AUTO-ENTMCNC: 30 G/DL (ref 32–36)
MCV RBC AUTO: 78.2 FL (ref 78–100)
MESOTHELIAL CELLS BODY FLUID: 11 %
MONOCYTES NFR FLD: 58 %
NEUTROPHILS NFR FLD: 5 %
PLATELET # BLD AUTO: 236 K/UL (ref 140–440)
PMV BLD AUTO: 10.5 FL (ref 7.5–11.1)
PROT FLD-MCNC: 1.8 G/DL
PROTHROMBIN TIME: 15.4 SEC (ref 11.7–14.5)
RBC # BLD AUTO: 3.03 M/UL (ref 4.6–6.2)
RBC # FLD: <2000 CELLS/UL
SPECIMEN TYPE: NORMAL
UNIDENT CELLS NFR FLD: 0 %
WBC # FLD: 140 CELLS/UL
WBC OTHER # BLD: 5.2 K/UL (ref 4–10.5)

## 2025-05-29 PROCEDURE — 82150 ASSAY OF AMYLASE: CPT

## 2025-05-29 PROCEDURE — P9047 ALBUMIN (HUMAN), 25%, 50ML: HCPCS | Performed by: RADIOLOGY

## 2025-05-29 PROCEDURE — 87205 SMEAR GRAM STAIN: CPT

## 2025-05-29 PROCEDURE — P9047 ALBUMIN (HUMAN), 25%, 50ML: HCPCS

## 2025-05-29 PROCEDURE — 6360000002 HC RX W HCPCS

## 2025-05-29 PROCEDURE — 85610 PROTHROMBIN TIME: CPT

## 2025-05-29 PROCEDURE — 85027 COMPLETE CBC AUTOMATED: CPT

## 2025-05-29 PROCEDURE — 84157 ASSAY OF PROTEIN OTHER: CPT

## 2025-05-29 PROCEDURE — 87070 CULTURE OTHR SPECIMN AEROBIC: CPT

## 2025-05-29 PROCEDURE — 49083 ABD PARACENTESIS W/IMAGING: CPT

## 2025-05-29 PROCEDURE — 82945 GLUCOSE OTHER FLUID: CPT

## 2025-05-29 PROCEDURE — 89051 BODY FLUID CELL COUNT: CPT

## 2025-05-29 PROCEDURE — 82042 OTHER SOURCE ALBUMIN QUAN EA: CPT

## 2025-05-29 PROCEDURE — 6360000002 HC RX W HCPCS: Performed by: RADIOLOGY

## 2025-05-29 PROCEDURE — 83615 LACTATE (LD) (LDH) ENZYME: CPT

## 2025-05-29 PROCEDURE — 87075 CULTR BACTERIA EXCEPT BLOOD: CPT

## 2025-05-29 PROCEDURE — 2709999900 IR US GUIDED PARACENTESIS

## 2025-05-29 RX ORDER — LIDOCAINE HYDROCHLORIDE 10 MG/ML
INJECTION, SOLUTION EPIDURAL; INFILTRATION; INTRACAUDAL; PERINEURAL PRN
Status: COMPLETED | OUTPATIENT
Start: 2025-05-29 | End: 2025-05-29

## 2025-05-29 RX ORDER — ALBUMIN (HUMAN) 12.5 G/50ML
SOLUTION INTRAVENOUS CONTINUOUS PRN
Status: COMPLETED | OUTPATIENT
Start: 2025-05-29 | End: 2025-05-29

## 2025-05-29 RX ADMIN — ALBUMIN (HUMAN) 37.5 G: 0.25 INJECTION, SOLUTION INTRAVENOUS at 11:47

## 2025-05-29 RX ADMIN — LIDOCAINE HYDROCHLORIDE 7 ML: 10 INJECTION, SOLUTION EPIDURAL; INFILTRATION; INTRACAUDAL; PERINEURAL at 11:49

## 2025-05-29 NOTE — PROGRESS NOTES
1156 pt arrived back to IR holding, dressing wnl with no bleeding noted.  Pt resting in bed with call light in reach    1202 pt iv removed, discharge instructions reviewed with pt no questions at this time.

## 2025-05-29 NOTE — CONSULTS
Consult Interventional Radiology    Date:2025  Name:Boby Glasgow   :1945   MR#:[id number]    SEX:male     Planned procedure:  paracentesis  Indication: ascites    H&P Status: H&P was reviewed, the patient was examined and no change has occurred in patient's condition since H&P was completed.    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  Initially treated with vancomycin but had a supratherapeutic level, hence, it was substituted by daptomycin.  Cumulative 6-week course of therapy will be completed on 3/13/2023.    Anxiety     Chronic heart failure, unspecified heart failure type (HCC) 2023    Cirrhosis, alcoholic (HCC)     Diabetes mellitus (HCC)     GERD (gastroesophageal reflux disease)     Hyperlipidemia     Hypertension     Meniere's disease     Portal hypertension (HCC)     Pulmonary nodules     Secondary esophageal varices without bleeding (HCC)     Sleep apnea     SOB (shortness of breath)     Thyroid disease         Past Surgical History:  Past Surgical History:   Procedure Laterality Date    APPENDECTOMY      BACK SURGERY      CHOLECYSTECTOMY      COLONOSCOPY      COLONOSCOPY N/A 2025    COLONOSCOPY POLYPECTOMY SNARE/BIOPSY performed by Lidia Green MD at [facility] ENDOSCOPY    ENDOSCOPY, COLON, DIAGNOSTIC      FOOT SURGERY      needle removed,not sure which foot, per wife.    TONSILLECTOMY      UPPER GASTROINTESTINAL ENDOSCOPY N/A 2022    EGD BIOPSY performed by Travis Phelan MD at [facility] ENDOSCOPY    UPPER GASTROINTESTINAL ENDOSCOPY N/A 2025    ESOPHAGOGASTRODUODENOSCOPY CONTROL HEMORRHAGE with APC with biopsies performed by Lidia Green MD at [facility] ENDOSCOPY    VASECTOMY         Social History:  Social History     Socioeconomic History    Marital status:      Spouse name: Not on file    Number of children: Not on file    Years of

## 2025-05-29 NOTE — PROGRESS NOTES
TRANSFER - OUT REPORT:    Verbal report given to mino(name) on Boby Glasgow being transferred to ir holding(unit) for routine post-op       Report consisted of patient's Situation, Background, Assessment and   Recommendations(SBAR).     Information from the following report(s) Nurse Handoff Report was reviewed with the receiving nurse.    Opportunity for questions and clarification was provided.      Patient transported with:   Registered Nurse

## 2025-05-29 NOTE — BRIEF OP NOTE
Department of Interventional Radiology Post-Procedure Note      Date: 5/29/2025     Procedure Performed:  paracentesis    Pre-procedure Diagnosis:  ascites    Post-procedure Diagnosis:  Same    Estimated blood loss:  Minimal    Preliminary Findings:  needle/catheter within ascitic fluid    Complications:  none    Full Report to Follow.    Electronically signed by Paul Madrigal DO on 5/29/2025 at 11:14 AM

## 2025-06-02 LAB — NON-GYN CYTOLOGY REPORT: NORMAL

## 2025-06-04 ENCOUNTER — RESULTS FOLLOW-UP (OUTPATIENT)
Dept: GASTROENTEROLOGY | Age: 80
End: 2025-06-04

## 2025-06-04 NOTE — PROGRESS NOTES
6/4/25 -  for patient:     .IR Procedure at [facility]:  6/5/25 @ 1130, arrival 1030          Follow your directions as prescribed by the doctor for your procedure and medications.  3.   Consult your provider as to when to stop blood thinner  4.   Do not take any pain medication within 6 hours of your procedure  5.   Do not drink any alcoholic beverages or use any street drugs 24 hours before procedure.  6.   Please wear simple, loose fitting clothing to the hospital.  Do not bring valuables (money,             credit cards, checkbooks, etc.)     7.   If you  have a Living Will and Durable Power of  for Healthcare, please bring in a copy.  8.   Please bring picture ID,  insurance card, paperwork from the doctors office            (H & P, Consent,  & card for implantable devices).  9.   Report to the information desk on the ground floor.  10. Take a shower the night before or morning of your procedure, do not apply any lotion, oil or powder.  11. You will need a responsible adult

## 2025-06-05 ENCOUNTER — HOSPITAL ENCOUNTER (OUTPATIENT)
Dept: INTERVENTIONAL RADIOLOGY/VASCULAR | Age: 80
Discharge: HOME | End: 2025-06-05
Payer: COMMERCIAL

## 2025-06-05 VITALS
OXYGEN SATURATION: 98 % | DIASTOLIC BLOOD PRESSURE: 52 MMHG | HEART RATE: 76 BPM | RESPIRATION RATE: 12 BRPM | SYSTOLIC BLOOD PRESSURE: 116 MMHG

## 2025-06-05 DIAGNOSIS — R18.8 CIRRHOSIS OF LIVER WITH ASCITES, UNSPECIFIED HEPATIC CIRRHOSIS TYPE (HCC): ICD-10-CM

## 2025-06-05 DIAGNOSIS — K74.60 CIRRHOSIS OF LIVER WITH ASCITES, UNSPECIFIED HEPATIC CIRRHOSIS TYPE (HCC): ICD-10-CM

## 2025-06-05 LAB
APPEARANCE FLD: NORMAL
BLASTS NFR FLD: 0 %
BODY FLD TYPE: NORMAL
CLOT CHECK: NORMAL
COLOR FLD: YELLOW
EOSINOPHIL NFR FLD: 0 %
LYMPHOCYTES NFR FLD: 30 %
MESOTHELIAL CELLS BODY FLUID: 5 %
MONOCYTES NFR FLD: 37 %
NEUTROPHILS NFR FLD: 33 %
RBC # FLD: <2000 CELLS/UL
UNIDENT CELLS NFR FLD: 0 %
WBC # FLD: 148 CELLS/UL

## 2025-06-05 PROCEDURE — 2709999900 IR US GUIDED PARACENTESIS

## 2025-06-05 PROCEDURE — 89051 BODY FLUID CELL COUNT: CPT

## 2025-06-05 PROCEDURE — P9047 ALBUMIN (HUMAN), 25%, 50ML: HCPCS | Performed by: RADIOLOGY

## 2025-06-05 PROCEDURE — 49083 ABD PARACENTESIS W/IMAGING: CPT

## 2025-06-05 PROCEDURE — 6360000002 HC RX W HCPCS: Performed by: RADIOLOGY

## 2025-06-05 RX ORDER — LIDOCAINE HYDROCHLORIDE 10 MG/ML
INJECTION, SOLUTION EPIDURAL; INFILTRATION; INTRACAUDAL; PERINEURAL PRN
Status: COMPLETED | OUTPATIENT
Start: 2025-06-05 | End: 2025-06-05

## 2025-06-05 RX ORDER — ALBUMIN (HUMAN) 12.5 G/50ML
SOLUTION INTRAVENOUS CONTINUOUS PRN
Status: COMPLETED | OUTPATIENT
Start: 2025-06-05 | End: 2025-06-05

## 2025-06-05 RX ADMIN — LIDOCAINE HYDROCHLORIDE 18 ML: 10 INJECTION, SOLUTION EPIDURAL; INFILTRATION; INTRACAUDAL; PERINEURAL at 11:33

## 2025-06-05 RX ADMIN — ALBUMIN (HUMAN) 37.5 G: 0.25 INJECTION, SOLUTION INTRAVENOUS at 11:55

## 2025-06-05 NOTE — BRIEF OP NOTE
Department of Interventional Radiology Post-Procedure Note      Date: 6/5/2025     Interventional Radiologist: Constantine    Procedure Performed:  paracentesis    H&P Status: H&P was reviewed, the patient was examined and no change has occurred in patient's condition since H&P was completed.    Pre-procedure Diagnosis:  ascites    Post-procedure Diagnosis:  SAme    Sedation:  none    Contrast used:  none    Estimated blood loss:  Minimal    Preliminary Findings:  Successful    Complications:  none    Full Report to Follow.    Electronically signed by Jennifer Fitch MD on 6/5/2025 at 1:21 PM

## 2025-06-05 NOTE — CONSULTS
Consult Interventional Radiology    Date:2025  Name:Boby Glasgow   :1945   MR#:[id number]    SEX:male     Planned procedure:  paracentesis  Indication: Ascites    H&P Status: H&P was reviewed, the patient was examined and no change has occurred in patient's condition since H&P was completed.    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  Initially treated with vancomycin but had a supratherapeutic level, hence, it was substituted by daptomycin.  Cumulative 6-week course of therapy will be completed on 3/13/2023.    Anxiety     Chronic heart failure, unspecified heart failure type (HCC) 2023    Cirrhosis, alcoholic (HCC)     Diabetes mellitus (HCC)     GERD (gastroesophageal reflux disease)     Hyperlipidemia     Hypertension     Meniere's disease     Portal hypertension (HCC)     Pulmonary nodules     Secondary esophageal varices without bleeding (HCC)     Sleep apnea     SOB (shortness of breath)     Thyroid disease         Past Surgical History:  Past Surgical History:   Procedure Laterality Date    APPENDECTOMY      BACK SURGERY      CHOLECYSTECTOMY      COLONOSCOPY      COLONOSCOPY N/A 2025    COLONOSCOPY POLYPECTOMY SNARE/BIOPSY performed by Lidia Green MD at [facility] ENDOSCOPY    ENDOSCOPY, COLON, DIAGNOSTIC      FOOT SURGERY      needle removed,not sure which foot, per wife.    TONSILLECTOMY      UPPER GASTROINTESTINAL ENDOSCOPY N/A 2022    EGD BIOPSY performed by Travis Phelan MD at [facility] ENDOSCOPY    UPPER GASTROINTESTINAL ENDOSCOPY N/A 2025    ESOPHAGOGASTRODUODENOSCOPY CONTROL HEMORRHAGE with APC with biopsies performed by Lidia Green MD at [facility] ENDOSCOPY    VASECTOMY         Social History:  Social History     Socioeconomic History    Marital status:      Spouse name: Not on file    Number of children: Not on file    Years of  Topical Metronidazole Pregnancy And Lactation Text: This medication is Pregnancy Category B and considered safe during pregnancy.  It is also considered safe to use while breastfeeding.

## 2025-06-05 NOTE — PROGRESS NOTES
TRANSFER - OUT REPORT:    Verbal report given to claribel(name) on Boby Glasgow being transferred to ir [facility](unit) for routine post-op       Report consisted of patient's Situation, Background, Assessment and   Recommendations(SBAR).     Information from the following report(s) Nurse Handoff Report was reviewed with the receiving nurse.    Opportunity for questions and clarification was provided.      Patient transported with:   Registered Nurse

## 2025-06-05 NOTE — PROGRESS NOTES
Pt to post op following paracentesis, pt alert and oriented x 4, VSS. Dressing clean dry and intact

## 2025-06-09 NOTE — PROGRESS NOTES
IR Procedure at [facility]:  6/12/25 at 1130, arrival at 1030.     NPO at Midnight      Follow your directions as prescribed by the doctor for your procedure and medications.  3.   Consult your provider as to when to stop blood thinner- No blood thinners   4.   Do not take any pain medication within 6 hours of your procedure  5.   Do not drink any alcoholic beverages or use any street drugs 24 hours before procedure.  6.   Please wear simple, loose fitting clothing to the hospital.  Do not bring valuables (money,             credit cards, checkbooks, etc.)     7.   If you  have a Living Will and Durable Power of  for Healthcare, please bring in a copy.  8.   Please bring picture ID,  insurance card, paperwork from the doctors office            (H & P, Consent,  & card for implantable devices).  9.   Report to the information desk on the ground floor.  10. Take a shower the night before or morning of your procedure, do not apply any lotion, oil or powder.  11. You will need a responsible adult

## 2025-06-12 ENCOUNTER — HOSPITAL ENCOUNTER (OUTPATIENT)
Dept: INTERVENTIONAL RADIOLOGY/VASCULAR | Age: 80
Discharge: HOME | End: 2025-06-12
Payer: COMMERCIAL

## 2025-06-12 VITALS
RESPIRATION RATE: 16 BRPM | SYSTOLIC BLOOD PRESSURE: 146 MMHG | OXYGEN SATURATION: 97 % | DIASTOLIC BLOOD PRESSURE: 74 MMHG | HEART RATE: 91 BPM

## 2025-06-12 DIAGNOSIS — K74.60 PROGRESSIVE NEURONAL DEGENERATION WITH LIVER CIRRHOSIS (HCC): ICD-10-CM

## 2025-06-12 DIAGNOSIS — G31.89 PROGRESSIVE NEURONAL DEGENERATION WITH LIVER CIRRHOSIS (HCC): ICD-10-CM

## 2025-06-12 LAB
ALBUMIN FLD-MCNC: 0.7 G/DL
PROT FLD-MCNC: 1.5 G/DL
SPECIMEN TYPE: NORMAL
SPECIMEN TYPE: NORMAL

## 2025-06-12 PROCEDURE — 6360000002 HC RX W HCPCS

## 2025-06-12 PROCEDURE — P9047 ALBUMIN (HUMAN), 25%, 50ML: HCPCS

## 2025-06-12 PROCEDURE — 49083 ABD PARACENTESIS W/IMAGING: CPT

## 2025-06-12 PROCEDURE — 89051 BODY FLUID CELL COUNT: CPT

## 2025-06-12 PROCEDURE — 2709999900 IR US GUIDED PARACENTESIS

## 2025-06-12 PROCEDURE — 84157 ASSAY OF PROTEIN OTHER: CPT

## 2025-06-12 PROCEDURE — 82042 OTHER SOURCE ALBUMIN QUAN EA: CPT

## 2025-06-12 PROCEDURE — 6360000002 HC RX W HCPCS: Performed by: RADIOLOGY

## 2025-06-12 PROCEDURE — 87205 SMEAR GRAM STAIN: CPT

## 2025-06-12 PROCEDURE — P9047 ALBUMIN (HUMAN), 25%, 50ML: HCPCS | Performed by: RADIOLOGY

## 2025-06-12 PROCEDURE — 87075 CULTR BACTERIA EXCEPT BLOOD: CPT

## 2025-06-12 PROCEDURE — 87070 CULTURE OTHR SPECIMN AEROBIC: CPT

## 2025-06-12 RX ORDER — LIDOCAINE HYDROCHLORIDE 10 MG/ML
INJECTION, SOLUTION EPIDURAL; INFILTRATION; INTRACAUDAL; PERINEURAL PRN
Status: COMPLETED | OUTPATIENT
Start: 2025-06-12 | End: 2025-06-12

## 2025-06-12 RX ORDER — ALBUMIN (HUMAN) 12.5 G/50ML
SOLUTION INTRAVENOUS CONTINUOUS PRN
Status: COMPLETED | OUTPATIENT
Start: 2025-06-12 | End: 2025-06-12

## 2025-06-12 RX ADMIN — LIDOCAINE HYDROCHLORIDE 12 ML: 10 INJECTION, SOLUTION EPIDURAL; INFILTRATION; INTRACAUDAL; PERINEURAL at 12:07

## 2025-06-12 RX ADMIN — ALBUMIN (HUMAN) 50 G: 0.25 INJECTION, SOLUTION INTRAVENOUS at 12:33

## 2025-06-12 NOTE — PROGRESS NOTES
1251 pt arrived back to IR holding from his procedure, pt resting in bed with no complaints, pt dressing wnl.    Pt iv removed and discharge instructions reviewed

## 2025-06-12 NOTE — PROGRESS NOTES
TRANSFER - OUT REPORT:    Verbal report given to Cammy RN and Lainey RN on Boby Glasgow being transferred to IR holding for routine post-op       Report consisted of patient's Situation, Background, Assessment and   Recommendations(SBAR).     Information from the following report(s) Nurse Handoff Report was reviewed with the receiving nurse.    Opportunity for questions and clarification was provided.      Patient transported with:   Registered Nurse    Successful paracentesis performed. 4200cc of cloudy yellow fluid was drained. 1000cc was sent to the lab. 50g of albumin was given.

## 2025-06-12 NOTE — CONSULTS
Consult Interventional Radiology    Date:2025  Name:Boby Glasgow   :1945   MR#:[id number]    SEX:male     Planned procedure:  paracentesis  Indication: ascites    H&P Status: H&P was reviewed, the patient was examined and no change has occurred in patient's condition since H&P was completed.    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  Initially treated with vancomycin but had a supratherapeutic level, hence, it was substituted by daptomycin.  Cumulative 6-week course of therapy will be completed on 3/13/2023.    Anxiety     Chronic heart failure, unspecified heart failure type (HCC) 2023    Cirrhosis, alcoholic (HCC)     Diabetes mellitus (HCC)     GERD (gastroesophageal reflux disease)     Hyperlipidemia     Hypertension     Meniere's disease     Portal hypertension (HCC)     Pulmonary nodules     Secondary esophageal varices without bleeding (HCC)     Sleep apnea     SOB (shortness of breath)     Thyroid disease         Past Surgical History:  Past Surgical History:   Procedure Laterality Date    APPENDECTOMY      BACK SURGERY      CHOLECYSTECTOMY      COLONOSCOPY      COLONOSCOPY N/A 2025    COLONOSCOPY POLYPECTOMY SNARE/BIOPSY performed by Lidia Green MD at [facility] ENDOSCOPY    ENDOSCOPY, COLON, DIAGNOSTIC      FOOT SURGERY      needle removed,not sure which foot, per wife.    TONSILLECTOMY      UPPER GASTROINTESTINAL ENDOSCOPY N/A 2022    EGD BIOPSY performed by Travis Phelan MD at [facility] ENDOSCOPY    UPPER GASTROINTESTINAL ENDOSCOPY N/A 2025    ESOPHAGOGASTRODUODENOSCOPY CONTROL HEMORRHAGE with APC with biopsies performed by Lidia Green MD at [facility] ENDOSCOPY    VASECTOMY         Social History:  Social History     Socioeconomic History    Marital status:      Spouse name: Not on file    Number of children: Not on file    Years of

## 2025-06-12 NOTE — BRIEF OP NOTE
Department of Interventional Radiology Post-Procedure Note      Date: 6/12/2025     Procedure Performed:  paracentesis    Pre-procedure Diagnosis:  ascites    Post-procedure Diagnosis:  Same    Estimated blood loss:  Minimal    Preliminary Findings:  access needle and catheter within large volume ascites    Complications:  none    Full Report to Follow.    Electronically signed by Paul Madrigal DO on 6/12/2025 at 12:05 PM

## 2025-06-13 NOTE — PROGRESS NOTES
.IR Procedure at [facility]:  6/19/25 @ 1130, arrival 1030         Follow your directions as prescribed by the doctor for your procedure and medications.  3.   Consult your provider as to when to stop blood thinner  4.   Do not take any pain medication within 6 hours of your procedure  5.   Do not drink any alcoholic beverages or use any street drugs 24 hours before procedure.  6.   Please wear simple, loose fitting clothing to the hospital.  Do not bring valuables (money,             credit cards, checkbooks, etc.)     7.   If you  have a Living Will and Durable Power of  for Healthcare, please bring in a copy.  8.   Please bring picture ID,  insurance card, paperwork from the doctors office            (H & P, Consent,  & card for implantable devices).  9.   Report to the information desk on the ground floor.  10. Take a shower the night before or morning of your procedure, do not apply any lotion, oil or powder.  11.

## 2025-06-16 ENCOUNTER — RESULTS FOLLOW-UP (OUTPATIENT)
Dept: GASTROENTEROLOGY | Age: 80
End: 2025-06-16

## 2025-06-18 NOTE — PROGRESS NOTES
IR Procedure at [facility]:  6/26/25 at 1130, arrival at 1030    Ok to eat before hand  Follow your directions as prescribed by the doctor for your procedure and medications.  3.   Consult your provider as to when to stop blood thinner- no blood thinners   4.   Do not take any pain medication within 6 hours of your procedure  5.   Do not drink any alcoholic beverages or use any street drugs 24 hours before procedure.  6.   Please wear simple, loose fitting clothing to the hospital.  Do not bring valuables (money,             credit cards, checkbooks, etc.)     7.   If you  have a Living Will and Durable Power of  for Healthcare, please bring in a copy.  8.   Please bring picture ID,  insurance card, paperwork from the doctors office            (H & P, Consent,  & card for implantable devices).  9.   Report to the information desk on the ground floor.  10. Take a shower the night before or morning of your procedure, do not apply any lotion, oil or powder.  11. You will need a responsible adult - can drive self

## 2025-06-19 ENCOUNTER — HOSPITAL ENCOUNTER (OUTPATIENT)
Dept: INTERVENTIONAL RADIOLOGY/VASCULAR | Age: 80
Discharge: HOME | End: 2025-06-19
Payer: COMMERCIAL

## 2025-06-19 VITALS
DIASTOLIC BLOOD PRESSURE: 75 MMHG | RESPIRATION RATE: 14 BRPM | HEART RATE: 75 BPM | OXYGEN SATURATION: 100 % | SYSTOLIC BLOOD PRESSURE: 123 MMHG

## 2025-06-19 DIAGNOSIS — R18.8 CIRRHOSIS OF LIVER WITH ASCITES, UNSPECIFIED HEPATIC CIRRHOSIS TYPE (HCC): ICD-10-CM

## 2025-06-19 DIAGNOSIS — K74.60 CIRRHOSIS OF LIVER WITH ASCITES, UNSPECIFIED HEPATIC CIRRHOSIS TYPE (HCC): ICD-10-CM

## 2025-06-19 LAB
APPEARANCE FLD: NORMAL
BLASTS NFR FLD: 0 %
BODY FLD TYPE: NORMAL
CLOT CHECK: NORMAL
COLOR FLD: YELLOW
EOSINOPHIL NFR FLD: 0 %
LYMPHOCYTES NFR FLD: 28 %
MESOTHELIAL CELLS BODY FLUID: 18 %
MONOCYTES NFR FLD: 67 %
NEUTROPHILS NFR FLD: 5 %
RBC # FLD: <2000 CELLS/UL
UNIDENT CELLS NFR FLD: 0 %
WBC # FLD: 107 CELLS/UL

## 2025-06-19 PROCEDURE — 6360000002 HC RX W HCPCS

## 2025-06-19 PROCEDURE — P9047 ALBUMIN (HUMAN), 25%, 50ML: HCPCS

## 2025-06-19 PROCEDURE — 6360000002 HC RX W HCPCS: Performed by: INTERNAL MEDICINE

## 2025-06-19 PROCEDURE — 2709999900 IR US GUIDED PARACENTESIS

## 2025-06-19 PROCEDURE — 49083 ABD PARACENTESIS W/IMAGING: CPT

## 2025-06-19 PROCEDURE — 87070 CULTURE OTHR SPECIMN AEROBIC: CPT

## 2025-06-19 PROCEDURE — 89051 BODY FLUID CELL COUNT: CPT

## 2025-06-19 PROCEDURE — 6360000002 HC RX W HCPCS: Performed by: RADIOLOGY

## 2025-06-19 PROCEDURE — 87075 CULTR BACTERIA EXCEPT BLOOD: CPT

## 2025-06-19 PROCEDURE — 87205 SMEAR GRAM STAIN: CPT

## 2025-06-19 PROCEDURE — P9047 ALBUMIN (HUMAN), 25%, 50ML: HCPCS | Performed by: INTERNAL MEDICINE

## 2025-06-19 RX ORDER — ALBUMIN (HUMAN) 12.5 G/50ML
SOLUTION INTRAVENOUS CONTINUOUS PRN
Status: COMPLETED | OUTPATIENT
Start: 2025-06-19 | End: 2025-06-19

## 2025-06-19 RX ORDER — LIDOCAINE HYDROCHLORIDE 10 MG/ML
INJECTION, SOLUTION EPIDURAL; INFILTRATION; INTRACAUDAL; PERINEURAL PRN
Status: COMPLETED | OUTPATIENT
Start: 2025-06-19 | End: 2025-06-19

## 2025-06-19 RX ADMIN — LIDOCAINE HYDROCHLORIDE 5 ML: 10 INJECTION, SOLUTION EPIDURAL; INFILTRATION; INTRACAUDAL; PERINEURAL at 11:01

## 2025-06-19 RX ADMIN — ALBUMIN (HUMAN) 37.5 G: 0.25 INJECTION, SOLUTION INTRAVENOUS at 11:39

## 2025-06-19 NOTE — PROGRESS NOTES
TRANSFER - OUT REPORT:    Verbal report given to Lainey/ Cammy (name) on Boyb Glasgow being transferred to IR [facility](unit) for routine post-op       Report consisted of patient's Situation, Background, Assessment and   Recommendations(SBAR).     Information from the following report(s) Nurse Handoff Report was reviewed with the receiving nurse.    Opportunity for questions and clarification was provided.      Patient transported with:   Registered Nurse

## 2025-06-19 NOTE — OR NURSING
Paracentesis completed.  6250cc of hazy yellow ascitic fluid drained, 1L sent to lab.  37.5g of Albumin given.

## 2025-06-23 ENCOUNTER — RESULTS FOLLOW-UP (OUTPATIENT)
Dept: GASTROENTEROLOGY | Age: 80
End: 2025-06-23

## 2025-06-25 NOTE — PROGRESS NOTES
LVM- IR Procedure at [facility]:  7/3/25 at 1130, arrival at 1030.     You are able to eat  Follow your directions as prescribed by the doctor for your procedure and medications.  3.   Consult your provider as to when to stop blood thinner- no blood thinners   4.   Do not take any pain medication within 6 hours of your procedure  5.   Do not drink any alcoholic beverages or use any street drugs 24 hours before procedure.  6.   Please wear simple, loose fitting clothing to the hospital.  Do not bring valuables (money,             credit cards, checkbooks, etc.)     7.   If you  have a Living Will and Durable Power of  for Healthcare, please bring in a copy.  8.   Please bring picture ID,  insurance card, paperwork from the doctors office            (H & P, Consent,  & card for implantable devices).  9.   Report to the information desk on the ground floor.  10. Take a shower the night before or morning of your procedure, do not apply any lotion, oil or powder.  11. You can drive your self

## 2025-06-26 ENCOUNTER — HOSPITAL ENCOUNTER (OUTPATIENT)
Dept: INTERVENTIONAL RADIOLOGY/VASCULAR | Age: 80
Discharge: HOME | End: 2025-06-26
Payer: COMMERCIAL

## 2025-06-26 VITALS — OXYGEN SATURATION: 98 % | SYSTOLIC BLOOD PRESSURE: 141 MMHG | HEART RATE: 72 BPM | DIASTOLIC BLOOD PRESSURE: 77 MMHG

## 2025-06-26 DIAGNOSIS — R18.8 PANCREATIC ASCITES: ICD-10-CM

## 2025-06-26 LAB
APPEARANCE FLD: CLEAR
BLASTS NFR FLD: 0 %
BODY FLD TYPE: NORMAL
CLOT CHECK: NORMAL
COLOR FLD: YELLOW
EOSINOPHIL NFR FLD: 0 %
LYMPHOCYTES NFR FLD: 25 %
MESOTHELIAL CELLS BODY FLUID: 9 %
MONOCYTES NFR FLD: 65 %
NEUTROPHILS NFR FLD: 10 %
RBC # FLD: <2000 CELLS/UL
UNIDENT CELLS NFR FLD: 0 %
WBC # FLD: 111 CELLS/UL

## 2025-06-26 PROCEDURE — P9047 ALBUMIN (HUMAN), 25%, 50ML: HCPCS | Performed by: RADIOLOGY

## 2025-06-26 PROCEDURE — 89051 BODY FLUID CELL COUNT: CPT

## 2025-06-26 PROCEDURE — 6360000002 HC RX W HCPCS

## 2025-06-26 PROCEDURE — P9047 ALBUMIN (HUMAN), 25%, 50ML: HCPCS

## 2025-06-26 PROCEDURE — 6360000002 HC RX W HCPCS: Performed by: RADIOLOGY

## 2025-06-26 PROCEDURE — 2709999900 IR US GUIDED PARACENTESIS

## 2025-06-26 PROCEDURE — 49083 ABD PARACENTESIS W/IMAGING: CPT

## 2025-06-26 RX ORDER — LIDOCAINE HYDROCHLORIDE 10 MG/ML
INJECTION, SOLUTION EPIDURAL; INFILTRATION; INTRACAUDAL; PERINEURAL PRN
Status: COMPLETED | OUTPATIENT
Start: 2025-06-26 | End: 2025-06-26

## 2025-06-26 RX ORDER — ALBUMIN (HUMAN) 12.5 G/50ML
SOLUTION INTRAVENOUS CONTINUOUS PRN
Status: COMPLETED | OUTPATIENT
Start: 2025-06-26 | End: 2025-06-26

## 2025-06-26 RX ADMIN — ALBUMIN (HUMAN) 37.5 G: 0.25 INJECTION, SOLUTION INTRAVENOUS at 13:32

## 2025-06-26 RX ADMIN — LIDOCAINE HYDROCHLORIDE 10 ML: 10 INJECTION, SOLUTION EPIDURAL; INFILTRATION; INTRACAUDAL; PERINEURAL at 12:56

## 2025-06-26 NOTE — CONSULTS
Consult Interventional Radiology    Date:2025  Name:Boby Glasgow   :1945   MR#:[id number]    SEX:male     Planned procedure:  paracentesis  Indication: Ascites    H&P Status: H&P was reviewed, the patient was examined and no change has occurred in patient's condition since H&P was completed.    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  Initially treated with vancomycin but had a supratherapeutic level, hence, it was substituted by daptomycin.  Cumulative 6-week course of therapy will be completed on 3/13/2023.    Anxiety     Chronic heart failure, unspecified heart failure type (HCC) 2023    Cirrhosis, alcoholic (HCC)     Diabetes mellitus (HCC)     GERD (gastroesophageal reflux disease)     Hyperlipidemia     Hypertension     Meniere's disease     Portal hypertension (HCC)     Pulmonary nodules     Secondary esophageal varices without bleeding (HCC)     Sleep apnea     SOB (shortness of breath)     Thyroid disease         Past Surgical History:  Past Surgical History:   Procedure Laterality Date    APPENDECTOMY      BACK SURGERY      CHOLECYSTECTOMY      COLONOSCOPY      COLONOSCOPY N/A 2025    COLONOSCOPY POLYPECTOMY SNARE/BIOPSY performed by Lidia Green MD at [facility] ENDOSCOPY    ENDOSCOPY, COLON, DIAGNOSTIC      FOOT SURGERY      needle removed,not sure which foot, per wife.    TONSILLECTOMY      UPPER GASTROINTESTINAL ENDOSCOPY N/A 2022    EGD BIOPSY performed by Travis Phelan MD at [facility] ENDOSCOPY    UPPER GASTROINTESTINAL ENDOSCOPY N/A 2025    ESOPHAGOGASTRODUODENOSCOPY CONTROL HEMORRHAGE with APC with biopsies performed by Lidia Green MD at [facility] ENDOSCOPY    VASECTOMY         Social History:  Social History     Socioeconomic History    Marital status:      Spouse name: Not on file    Number of children: Not on file    Years of

## 2025-06-26 NOTE — BRIEF OP NOTE
Department of Interventional Radiology Post-Procedure Note      Date: 6/26/2025     Interventional Radiologist: Constantine    Procedure Performed:  paracentesis    H&P Status: H&P was reviewed, the patient was examined and no change has occurred in patient's condition since H&P was completed.    Pre-procedure Diagnosis:  Ascites    Post-procedure Diagnosis:  Same    Sedation:  none    Contrast used:  none    Estimated blood loss:  Minimal    Preliminary Findings:  Successful    Complications:  none    Full Report to Follow.    Electronically signed by Jennifer Fitch MD on 6/26/2025 at 2:09 PM

## 2025-06-26 NOTE — OR NURSING
Pt had a paracentesis performed by Dr Fitch, with a total of 6130 cc of cloudy yellow fluid removed, of that 200 cc of fluid was sent to lab for testing

## 2025-06-26 NOTE — PROGRESS NOTES
Discharge instructions given to patient and all questions answered at this time. Patient discharged with all belongings.

## 2025-06-26 NOTE — PROGRESS NOTES
TRANSFER - OUT REPORT:    Verbal report given to Marj/Lainey RN on Boby Glasgow being transferred to IR [facility] for routine progression of patient care       Report consisted of patient's Situation, Background, Assessment and   Recommendations(SBAR).     Information from the following report(s) Nurse Handoff Report was reviewed with the receiving nurse.    Opportunity for questions and clarification was provided.      Patient transported with:   Registered Nurse

## 2025-06-30 NOTE — PROGRESS NOTES
IR Procedure at [facility]:  JULY 10. 2025 AT 1130, ARRIVAL AT 1030. MESSAGE TO CALL PAT WITH QUESTIONS.       Follow your directions as prescribed by the doctor for your procedure and medications.  3.   Consult your provider as to when to stop blood thinner  4.   Do not take any pain medication within 6 hours of your procedure  5.   Do not drink any alcoholic beverages or use any street drugs 24 hours before procedure.  6.   Please wear simple, loose fitting clothing to the hospital.  Do not bring valuables (money,             credit cards, checkbooks, etc.)     7.   If you  have a Living Will and Durable Power of  for Healthcare, please bring in a copy.  8.   Please bring picture ID,  insurance card, paperwork from the doctors office            (H & P, Consent,  & card for implantable devices).  9.   Report to the information desk on the ground floor.  10. Take a shower the night before or morning of your procedure, do not apply any lotion, oil or powder.  11. You will need a responsible adult .  MAY DRIVE HIMSELF UNLESS FEELING UNABLE THAT DAY.

## 2025-07-02 ENCOUNTER — OFFICE VISIT (OUTPATIENT)
Dept: FAMILY MEDICINE CLINIC | Age: 80
End: 2025-07-02
Payer: COMMERCIAL

## 2025-07-02 ENCOUNTER — HOSPITAL ENCOUNTER (OUTPATIENT)
Age: 80
Discharge: HOME | End: 2025-07-02
Payer: COMMERCIAL

## 2025-07-02 ENCOUNTER — RESULTS FOLLOW-UP (OUTPATIENT)
Dept: FAMILY MEDICINE CLINIC | Age: 80
End: 2025-07-02

## 2025-07-02 VITALS
SYSTOLIC BLOOD PRESSURE: 140 MMHG | DIASTOLIC BLOOD PRESSURE: 78 MMHG | OXYGEN SATURATION: 99 % | WEIGHT: 243.2 LBS | RESPIRATION RATE: 18 BRPM | HEIGHT: 68 IN | HEART RATE: 101 BPM | BODY MASS INDEX: 36.86 KG/M2

## 2025-07-02 DIAGNOSIS — K70.31 ALCOHOLIC CIRRHOSIS OF LIVER WITH ASCITES (HCC): ICD-10-CM

## 2025-07-02 DIAGNOSIS — E11.22 TYPE 2 DIABETES MELLITUS WITH STAGE 3 CHRONIC KIDNEY DISEASE, WITHOUT LONG-TERM CURRENT USE OF INSULIN, UNSPECIFIED WHETHER STAGE 3A OR 3B CKD (HCC): Primary | ICD-10-CM

## 2025-07-02 DIAGNOSIS — E66.01 MORBID (SEVERE) OBESITY DUE TO EXCESS CALORIES (HCC): ICD-10-CM

## 2025-07-02 DIAGNOSIS — I10 PRIMARY HYPERTENSION: ICD-10-CM

## 2025-07-02 DIAGNOSIS — N18.30 TYPE 2 DIABETES MELLITUS WITH STAGE 3 CHRONIC KIDNEY DISEASE, WITHOUT LONG-TERM CURRENT USE OF INSULIN, UNSPECIFIED WHETHER STAGE 3A OR 3B CKD (HCC): Primary | ICD-10-CM

## 2025-07-02 DIAGNOSIS — E78.2 MIXED HYPERLIPIDEMIA: ICD-10-CM

## 2025-07-02 DIAGNOSIS — H91.90 HEARING LOSS, UNSPECIFIED HEARING LOSS TYPE, UNSPECIFIED LATERALITY: ICD-10-CM

## 2025-07-02 LAB
AMMONIA PLAS-SCNC: 82 UMOL/L (ref 16–60)
BASOPHILS # BLD: 0.06 K/UL
BASOPHILS NFR BLD: 1 % (ref 0–1)
EOSINOPHIL # BLD: 0.4 K/UL
EOSINOPHILS RELATIVE PERCENT: 7 % (ref 0–3)
ERYTHROCYTE [DISTWIDTH] IN BLOOD BY AUTOMATED COUNT: 19.8 % (ref 11.7–14.9)
HCT VFR BLD AUTO: 27.2 % (ref 42–52)
HGB BLD-MCNC: 8 G/DL (ref 13.5–18)
IMM GRANULOCYTES # BLD AUTO: 0.02 K/UL
IMM GRANULOCYTES NFR BLD: 0 %
LYMPHOCYTES NFR BLD: 0.4 K/UL
LYMPHOCYTES RELATIVE PERCENT: 7 % (ref 24–44)
MCH RBC QN AUTO: 23.4 PG (ref 27–31)
MCHC RBC AUTO-ENTMCNC: 29.4 G/DL (ref 32–36)
MCV RBC AUTO: 79.5 FL (ref 78–100)
MONOCYTES NFR BLD: 0.67 K/UL
MONOCYTES NFR BLD: 12 % (ref 0–5)
NEUTROPHILS NFR BLD: 72 % (ref 36–66)
NEUTS SEG NFR BLD: 4.06 K/UL
PLATELET # BLD AUTO: 235 K/UL (ref 140–440)
PMV BLD AUTO: 9.6 FL (ref 7.5–11.1)
RBC # BLD AUTO: 3.42 M/UL (ref 4.6–6.2)
WBC OTHER # BLD: 5.6 K/UL (ref 4–10.5)

## 2025-07-02 PROCEDURE — 99215 OFFICE O/P EST HI 40 MIN: CPT | Performed by: INTERNAL MEDICINE

## 2025-07-02 PROCEDURE — 1123F ACP DISCUSS/DSCN MKR DOCD: CPT | Performed by: INTERNAL MEDICINE

## 2025-07-02 PROCEDURE — 3077F SYST BP >= 140 MM HG: CPT | Performed by: INTERNAL MEDICINE

## 2025-07-02 PROCEDURE — 85025 COMPLETE CBC W/AUTO DIFF WBC: CPT

## 2025-07-02 PROCEDURE — 1160F RVW MEDS BY RX/DR IN RCRD: CPT | Performed by: INTERNAL MEDICINE

## 2025-07-02 PROCEDURE — 82140 ASSAY OF AMMONIA: CPT

## 2025-07-02 PROCEDURE — 1159F MED LIST DOCD IN RCRD: CPT | Performed by: INTERNAL MEDICINE

## 2025-07-02 PROCEDURE — 3078F DIAST BP <80 MM HG: CPT | Performed by: INTERNAL MEDICINE

## 2025-07-02 NOTE — PROGRESS NOTES
Outpatient Clinic Visit Note    Patient: Boby Glasgow  : 1945 (79 y.o.)  Date: 2025    CC:  Chief Complaint   Patient presents with    3 Month Follow-Up     ntp    Spasms    Other     Wife states pt is out of it sometimes     Nausea     Pt states he feels nausea        HPI: he follows with IR for paracentesis weekly.  He does not check his sugars at home. He feels tired.  Occ muscle spasms.      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  Initially treated with vancomycin but had a supratherapeutic level, hence, it was substituted by daptomycin.  Cumulative 6-week course of therapy will be completed on 3/13/2023.    Anxiety     Chronic heart failure, unspecified heart failure type (HCC) 2023    Cirrhosis, alcoholic (HCC)     Diabetes mellitus (HCC)     GERD (gastroesophageal reflux disease)     Hyperlipidemia     Hypertension     Meniere's disease     Portal hypertension (HCC)     Pulmonary nodules     Secondary esophageal varices without bleeding (HCC)     Sleep apnea     SOB (shortness of breath)     Thyroid disease        Past Surgical History:  Past Surgical History:   Procedure Laterality Date    APPENDECTOMY      BACK SURGERY      CHOLECYSTECTOMY      COLONOSCOPY      COLONOSCOPY N/A 2025    COLONOSCOPY POLYPECTOMY SNARE/BIOPSY performed by Lidia Green MD at [facility] ENDOSCOPY    ENDOSCOPY, COLON, DIAGNOSTIC      FOOT SURGERY      needle removed,not sure which foot, per wife.    TONSILLECTOMY      UPPER GASTROINTESTINAL ENDOSCOPY N/A 2022    EGD BIOPSY performed by Travis Phelan MD at [facility] ENDOSCOPY    UPPER GASTROINTESTINAL ENDOSCOPY N/A 2025    ESOPHAGOGASTRODUODENOSCOPY CONTROL HEMORRHAGE with APC with biopsies performed by Lidia Green MD at [facility] ENDOSCOPY    VASECTOMY         Home Medications:  Current Outpatient Medications   Medication

## 2025-07-03 ENCOUNTER — HOSPITAL ENCOUNTER (OUTPATIENT)
Dept: INTERVENTIONAL RADIOLOGY/VASCULAR | Age: 80
Discharge: HOME | End: 2025-07-03
Payer: COMMERCIAL

## 2025-07-03 VITALS
SYSTOLIC BLOOD PRESSURE: 171 MMHG | HEART RATE: 105 BPM | DIASTOLIC BLOOD PRESSURE: 117 MMHG | RESPIRATION RATE: 15 BRPM | OXYGEN SATURATION: 98 % | TEMPERATURE: 98.3 F

## 2025-07-03 DIAGNOSIS — K72.90 HEPATIC NECROSIS (HCC): ICD-10-CM

## 2025-07-03 LAB
ALBUMIN FLD-MCNC: 0.7 G/DL
AMYLASE FLD-CCNC: 12 U/L
APPEARANCE FLD: NORMAL
BLASTS NFR FLD: 0 %
BODY FLD TYPE: NORMAL
CLOT CHECK: NORMAL
COLOR FLD: YELLOW
EOSINOPHIL NFR FLD: 0 %
GLUCOSE FLD-MCNC: 111 MG/DL
INR PPP: 1.2
LDH FLD L TO P-CCNC: 44 U/L
LYMPHOCYTES NFR FLD: 36 %
MESOTHELIAL CELLS BODY FLUID: 9 %
MONOCYTES NFR FLD: 38 %
NEUTROPHILS NFR FLD: 26 %
PROT FLD-MCNC: 1.4 G/DL
PROTHROMBIN TIME: 14.8 SEC (ref 11.7–14.5)
RBC # FLD: <2000 CELLS/UL
SPECIMEN TYPE: NORMAL
UNIDENT CELLS NFR FLD: 0 %
WBC # FLD: 111 CELLS/UL

## 2025-07-03 PROCEDURE — 83615 LACTATE (LD) (LDH) ENZYME: CPT

## 2025-07-03 PROCEDURE — 6360000002 HC RX W HCPCS

## 2025-07-03 PROCEDURE — C1729 CATH, DRAINAGE: HCPCS

## 2025-07-03 PROCEDURE — 87205 SMEAR GRAM STAIN: CPT

## 2025-07-03 PROCEDURE — 89051 BODY FLUID CELL COUNT: CPT

## 2025-07-03 PROCEDURE — 87070 CULTURE OTHR SPECIMN AEROBIC: CPT

## 2025-07-03 PROCEDURE — 82945 GLUCOSE OTHER FLUID: CPT

## 2025-07-03 PROCEDURE — 49083 ABD PARACENTESIS W/IMAGING: CPT

## 2025-07-03 PROCEDURE — 82150 ASSAY OF AMYLASE: CPT

## 2025-07-03 PROCEDURE — P9047 ALBUMIN (HUMAN), 25%, 50ML: HCPCS | Performed by: RADIOLOGY

## 2025-07-03 PROCEDURE — 85610 PROTHROMBIN TIME: CPT

## 2025-07-03 PROCEDURE — 6360000002 HC RX W HCPCS: Performed by: RADIOLOGY

## 2025-07-03 PROCEDURE — 82042 OTHER SOURCE ALBUMIN QUAN EA: CPT

## 2025-07-03 PROCEDURE — 84157 ASSAY OF PROTEIN OTHER: CPT

## 2025-07-03 PROCEDURE — P9047 ALBUMIN (HUMAN), 25%, 50ML: HCPCS

## 2025-07-03 PROCEDURE — 87075 CULTR BACTERIA EXCEPT BLOOD: CPT

## 2025-07-03 RX ORDER — LIDOCAINE HYDROCHLORIDE 10 MG/ML
INJECTION, SOLUTION EPIDURAL; INFILTRATION; INTRACAUDAL; PERINEURAL PRN
Status: COMPLETED | OUTPATIENT
Start: 2025-07-03 | End: 2025-07-03

## 2025-07-03 RX ORDER — ALBUMIN (HUMAN) 12.5 G/50ML
SOLUTION INTRAVENOUS CONTINUOUS PRN
Status: COMPLETED | OUTPATIENT
Start: 2025-07-03 | End: 2025-07-03

## 2025-07-03 RX ADMIN — ALBUMIN (HUMAN) 12.5 G: 0.25 INJECTION, SOLUTION INTRAVENOUS at 11:50

## 2025-07-03 RX ADMIN — LIDOCAINE HYDROCHLORIDE 10 ML: 10 INJECTION, SOLUTION EPIDURAL; INFILTRATION; INTRACAUDAL; PERINEURAL at 11:48

## 2025-07-03 NOTE — PROGRESS NOTES
TRANSFER - OUT REPORT:    Verbal report given to mat(name) on Boby Glasgow being transferred to ir holding(unit) for routine post-op       Report consisted of patient's Situation, Background, Assessment and   Recommendations(SBAR).     Information from the following report(s) Nurse Handoff Report was reviewed with the receiving nurse.    Opportunity for questions and clarification was provided.      Patient transported with:

## 2025-07-03 NOTE — PROGRESS NOTES
Discharge instructions given to pt. All questions answered at this time. Pt wheeled out by volunteer.

## 2025-07-07 ENCOUNTER — RESULTS FOLLOW-UP (OUTPATIENT)
Dept: GASTROENTEROLOGY | Age: 80
End: 2025-07-07

## 2025-07-09 LAB — NON-GYN CYTOLOGY REPORT: NORMAL

## 2025-07-10 ENCOUNTER — HOSPITAL ENCOUNTER (OUTPATIENT)
Dept: INTERVENTIONAL RADIOLOGY/VASCULAR | Age: 80
Discharge: HOME | End: 2025-07-10
Payer: COMMERCIAL

## 2025-07-10 VITALS
OXYGEN SATURATION: 100 % | RESPIRATION RATE: 16 BRPM | DIASTOLIC BLOOD PRESSURE: 70 MMHG | HEART RATE: 75 BPM | SYSTOLIC BLOOD PRESSURE: 148 MMHG

## 2025-07-10 DIAGNOSIS — R18.8 ASCITES OF LIVER: ICD-10-CM

## 2025-07-10 LAB
ALBUMIN FLD-MCNC: 0.7 G/DL
APPEARANCE FLD: NORMAL
BODY FLD TYPE: NORMAL
CLOT CHECK: NORMAL
COLOR FLD: YELLOW
MONOCYTES NFR FLD: 89 %
NEUTROPHILS NFR FLD: 11 %
PROT FLD-MCNC: 1.5 G/DL
RBC # FLD: <2000 CELLS/UL
SPECIMEN TYPE: NORMAL
SPECIMEN TYPE: NORMAL
WBC # FLD: 94 CELLS/UL

## 2025-07-10 PROCEDURE — 6360000002 HC RX W HCPCS: Performed by: INTERNAL MEDICINE

## 2025-07-10 PROCEDURE — 6360000002 HC RX W HCPCS: Performed by: RADIOLOGY

## 2025-07-10 PROCEDURE — 87205 SMEAR GRAM STAIN: CPT

## 2025-07-10 PROCEDURE — P9047 ALBUMIN (HUMAN), 25%, 50ML: HCPCS | Performed by: INTERNAL MEDICINE

## 2025-07-10 PROCEDURE — 87075 CULTR BACTERIA EXCEPT BLOOD: CPT

## 2025-07-10 PROCEDURE — 84157 ASSAY OF PROTEIN OTHER: CPT

## 2025-07-10 PROCEDURE — C1769 GUIDE WIRE: HCPCS

## 2025-07-10 PROCEDURE — 89051 BODY FLUID CELL COUNT: CPT

## 2025-07-10 PROCEDURE — 6360000002 HC RX W HCPCS

## 2025-07-10 PROCEDURE — 87070 CULTURE OTHR SPECIMN AEROBIC: CPT

## 2025-07-10 PROCEDURE — P9047 ALBUMIN (HUMAN), 25%, 50ML: HCPCS

## 2025-07-10 PROCEDURE — 82042 OTHER SOURCE ALBUMIN QUAN EA: CPT

## 2025-07-10 RX ORDER — LIDOCAINE HYDROCHLORIDE 10 MG/ML
INJECTION, SOLUTION EPIDURAL; INFILTRATION; INTRACAUDAL; PERINEURAL PRN
Status: COMPLETED | OUTPATIENT
Start: 2025-07-10 | End: 2025-07-10

## 2025-07-10 RX ORDER — ALBUMIN (HUMAN) 12.5 G/50ML
SOLUTION INTRAVENOUS CONTINUOUS PRN
Status: COMPLETED | OUTPATIENT
Start: 2025-07-10 | End: 2025-07-10

## 2025-07-10 RX ADMIN — ALBUMIN (HUMAN) 37.5 G: 0.25 INJECTION, SOLUTION INTRAVENOUS at 12:59

## 2025-07-10 RX ADMIN — LIDOCAINE HYDROCHLORIDE 12 ML: 10 INJECTION, SOLUTION EPIDURAL; INFILTRATION; INTRACAUDAL; PERINEURAL at 11:50

## 2025-07-10 NOTE — BRIEF OP NOTE
Department of Interventional Radiology Post-Procedure Note      Date: 7/10/2025     Interventional Radiologist: Constantine    Procedure Performed:  paracentesis    H&P Status: H&P was reviewed, the patient was examined and no change has occurred in patient's condition since H&P was completed.    Pre-procedure Diagnosis:  ascites    Post-procedure Diagnosis:  SAme    Sedation:  none    Contrast used:  none    Estimated blood loss:  Minimal    Preliminary Findings:  Successful    Complications:  none    Full Report to Follow.    Electronically signed by Jennifer Fitch MD on 7/10/2025 at 11:56 AM

## 2025-07-10 NOTE — PROGRESS NOTES
Pt arrived back to IR holding, dressing wnl with no bleeding noted.  Pt vitals wnl.    Pt discharge instructions reviewed with pt. No further questions at this time.  Pt discharged with no complaints, pt dressing wnl.

## 2025-07-10 NOTE — PROGRESS NOTES
TRANSFER - OUT REPORT:    Verbal report given to JANNETH Barriga on Boby Glasgow being transferred to IR holding for routine post-op       Report consisted of patient's Situation, Background, Assessment and   Recommendations(SBAR).     Information from the following report(s) Nurse Handoff Report was reviewed with the receiving nurse.    Opportunity for questions and clarification was provided.      Patient transported with:   Registered Nurse

## 2025-07-10 NOTE — CONSULTS
Consult Interventional Radiology    Date:7/10/2025  Name:Boby Glasgow   :1945   MR#:[id number]    SEX:male     Planned procedure:  paracentesis  Indication: Ascites    H&P Status: H&P was reviewed, the patient was examined and no change has occurred in patient's condition since H&P was completed.    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  Initially treated with vancomycin but had a supratherapeutic level, hence, it was substituted by daptomycin.  Cumulative 6-week course of therapy will be completed on 3/13/2023.    Anxiety     Chronic heart failure, unspecified heart failure type (HCC) 2023    Cirrhosis, alcoholic (HCC)     Diabetes mellitus (HCC)     GERD (gastroesophageal reflux disease)     Hyperlipidemia     Hypertension     Meniere's disease     Portal hypertension (HCC)     Pulmonary nodules     Secondary esophageal varices without bleeding (HCC)     Sleep apnea     SOB (shortness of breath)     Thyroid disease         Past Surgical History:  Past Surgical History:   Procedure Laterality Date    APPENDECTOMY      BACK SURGERY      CHOLECYSTECTOMY      COLONOSCOPY      COLONOSCOPY N/A 2025    COLONOSCOPY POLYPECTOMY SNARE/BIOPSY performed by Lidia Green MD at [facility] ENDOSCOPY    ENDOSCOPY, COLON, DIAGNOSTIC      FOOT SURGERY      needle removed,not sure which foot, per wife.    TONSILLECTOMY      UPPER GASTROINTESTINAL ENDOSCOPY N/A 2022    EGD BIOPSY performed by Travis Phelan MD at [facility] ENDOSCOPY    UPPER GASTROINTESTINAL ENDOSCOPY N/A 2025    ESOPHAGOGASTRODUODENOSCOPY CONTROL HEMORRHAGE with APC with biopsies performed by Lidia Green MD at [facility] ENDOSCOPY    VASECTOMY         Social History:  Social History     Socioeconomic History    Marital status:      Spouse name: Not on file    Number of children: Not on file    Years of

## 2025-07-11 NOTE — PROGRESS NOTES
LVM- IR Procedure at [facility]:  7/17/25 at 1130, arrival at 1030.    You are able to eat and drink     Follow your directions as prescribed by the doctor for your procedure and medications.  3.   Consult your provider as to when to stop blood thinner- no blood thinners   4.   Do not take any pain medication within 6 hours of your procedure  5.   Do not drink any alcoholic beverages or use any street drugs 24 hours before procedure.  6.   Please wear simple, loose fitting clothing to the hospital.  Do not bring valuables (money,             credit cards, checkbooks, etc.)     7.   If you  have a Living Will and Durable Power of  for Healthcare, please bring in a copy.  8.   Please bring picture ID,  insurance card, paperwork from the doctors office            (H & P, Consent,  & card for implantable devices).  9.   Report to the information desk on the ground floor.  10. Take a shower the night before or morning of your procedure, do not apply any lotion, oil or powder.  11. You are able to drive yourself

## 2025-07-16 NOTE — PROGRESS NOTES
IR Procedure at [facility]:  Left message on patient's wife Gaby's phone for patient to arrive at 1030 at [facility] on 7/24/2025 for his procedure at 1130. Patient has been given below instructions and will take his medications as scheduled.      Follow your directions as prescribed by the doctor for your procedure and medications.  3.   Consult your provider as to when to stop blood thinner  4.   Do not take any pain medication within 6 hours of your procedure  5.   Do not drink any alcoholic beverages or use any street drugs 24 hours before procedure.  6.   Please wear simple, loose fitting clothing to the hospital.  Do not bring valuables (money,             credit cards, checkbooks, etc.)     7.   If you  have a Living Will and Durable Power of  for Healthcare, please bring in a copy.  8.   Please bring picture ID,  insurance card, paperwork from the doctors office            (H & P, Consent,  & card for implantable devices).  9.   Report to the information desk on the ground floor.  10. Take a shower the night before or morning of your procedure, do not apply any lotion, oil or powder.  11. You will need a responsible adult

## 2025-07-17 ENCOUNTER — HOSPITAL ENCOUNTER (OUTPATIENT)
Dept: INTERVENTIONAL RADIOLOGY/VASCULAR | Age: 80
Discharge: HOME | End: 2025-07-17
Payer: COMMERCIAL

## 2025-07-17 VITALS
DIASTOLIC BLOOD PRESSURE: 62 MMHG | OXYGEN SATURATION: 97 % | RESPIRATION RATE: 16 BRPM | SYSTOLIC BLOOD PRESSURE: 159 MMHG | HEART RATE: 85 BPM

## 2025-07-17 DIAGNOSIS — K74.69 FLORID CIRRHOSIS (HCC): ICD-10-CM

## 2025-07-17 LAB
ALBUMIN FLD-MCNC: 0.7 G/DL
APPEARANCE FLD: NORMAL
BODY FLD TYPE: NORMAL
CLOT CHECK: NORMAL
COLOR FLD: YELLOW
MONOCYTES NFR FLD: 88 %
NEUTROPHILS NFR FLD: 12 %
PROT FLD-MCNC: 1.4 G/DL
RBC # FLD: <2000 CELLS/UL
SPECIMEN TYPE: NORMAL
SPECIMEN TYPE: NORMAL
WBC # FLD: 92 CELLS/UL

## 2025-07-17 PROCEDURE — 87205 SMEAR GRAM STAIN: CPT

## 2025-07-17 PROCEDURE — 89051 BODY FLUID CELL COUNT: CPT

## 2025-07-17 PROCEDURE — 6360000002 HC RX W HCPCS

## 2025-07-17 PROCEDURE — 84157 ASSAY OF PROTEIN OTHER: CPT

## 2025-07-17 PROCEDURE — 6360000002 HC RX W HCPCS: Performed by: RADIOLOGY

## 2025-07-17 PROCEDURE — P9047 ALBUMIN (HUMAN), 25%, 50ML: HCPCS | Performed by: RADIOLOGY

## 2025-07-17 PROCEDURE — 87070 CULTURE OTHR SPECIMN AEROBIC: CPT

## 2025-07-17 PROCEDURE — 87075 CULTR BACTERIA EXCEPT BLOOD: CPT

## 2025-07-17 PROCEDURE — 49083 ABD PARACENTESIS W/IMAGING: CPT

## 2025-07-17 PROCEDURE — 2709999900 IR US GUIDED PARACENTESIS

## 2025-07-17 PROCEDURE — 82042 OTHER SOURCE ALBUMIN QUAN EA: CPT

## 2025-07-17 PROCEDURE — P9047 ALBUMIN (HUMAN), 25%, 50ML: HCPCS

## 2025-07-17 RX ORDER — ALBUMIN (HUMAN) 12.5 G/50ML
SOLUTION INTRAVENOUS CONTINUOUS PRN
Status: COMPLETED | OUTPATIENT
Start: 2025-07-17 | End: 2025-07-17

## 2025-07-17 RX ORDER — LIDOCAINE HYDROCHLORIDE 10 MG/ML
INJECTION, SOLUTION EPIDURAL; INFILTRATION; INTRACAUDAL; PERINEURAL PRN
Status: COMPLETED | OUTPATIENT
Start: 2025-07-17 | End: 2025-07-17

## 2025-07-17 RX ADMIN — ALBUMIN (HUMAN) 50 G: 0.25 INJECTION, SOLUTION INTRAVENOUS at 11:49

## 2025-07-17 RX ADMIN — LIDOCAINE HYDROCHLORIDE 8 ML: 10 INJECTION, SOLUTION EPIDURAL; INFILTRATION; INTRACAUDAL; PERINEURAL at 11:35

## 2025-07-17 NOTE — PROGRESS NOTES
Returned to IR after Paracentesis with Dr. Madrigal.  Tolerated well. Preparing for discharge soon. Call light in reach. Drink provided.

## 2025-07-17 NOTE — CONSULTS
Consult Interventional Radiology    Date:2025  Name:Boby Glasgow   :1945   MR#:[id number]    SEX:male     Planned procedure:  us guided paracentesis  Indication: ascites    H&P Status: H&P was reviewed, the patient was examined and no change has occurred in patient's condition since H&P was completed.    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  Initially treated with vancomycin but had a supratherapeutic level, hence, it was substituted by daptomycin.  Cumulative 6-week course of therapy will be completed on 3/13/2023.    Anxiety     Chronic heart failure, unspecified heart failure type (HCC) 2023    Cirrhosis, alcoholic (HCC)     Diabetes mellitus (HCC)     GERD (gastroesophageal reflux disease)     Hyperlipidemia     Hypertension     Meniere's disease     Portal hypertension (HCC)     Pulmonary nodules     Secondary esophageal varices without bleeding (HCC)     Sleep apnea     SOB (shortness of breath)     Thyroid disease         Past Surgical History:  Past Surgical History:   Procedure Laterality Date    APPENDECTOMY      BACK SURGERY      CHOLECYSTECTOMY      COLONOSCOPY      COLONOSCOPY N/A 2025    COLONOSCOPY POLYPECTOMY SNARE/BIOPSY performed by Lidia Green MD at [facility] ENDOSCOPY    ENDOSCOPY, COLON, DIAGNOSTIC      FOOT SURGERY      needle removed,not sure which foot, per wife.    TONSILLECTOMY      UPPER GASTROINTESTINAL ENDOSCOPY N/A 2022    EGD BIOPSY performed by Travis Phelan MD at [facility] ENDOSCOPY    UPPER GASTROINTESTINAL ENDOSCOPY N/A 2025    ESOPHAGOGASTRODUODENOSCOPY CONTROL HEMORRHAGE with APC with biopsies performed by Lidia Green MD at [facility] ENDOSCOPY    VASECTOMY         Social History:  Social History     Socioeconomic History    Marital status:      Spouse name: Not on file    Number of children: Not on file

## 2025-07-17 NOTE — PROGRESS NOTES
TRANSFER - OUT REPORT:    Verbal report given to Natalia RN and Marj RN on Boby Glasgow being transferred to IR holding for routine post-op       Report consisted of patient's Situation, Background, Assessment and   Recommendations(SBAR).     Information from the following report(s) Nurse Handoff Report was reviewed with the receiving nurse.    Opportunity for questions and clarification was provided.      Patient transported with:   Registered Nurse    Successful paracentesis performed. 5250cc of cloudy yellow fluid was drained. 1000cc was sent tot he lab. 50g of albumin was given

## 2025-07-17 NOTE — BRIEF OP NOTE
Department of Interventional Radiology Post-Procedure Note      Date: 7/17/2025     Procedure Performed:  us guided paracentesis    Pre-procedure Diagnosis:  ascites    Post-procedure Diagnosis:  Same    Estimated blood loss:  Minimal    Preliminary Findings:  catheter within ascitic fluid.  Chylous fluid return on catheter placement    Complications:  none    Full Report to Follow.    Electronically signed by Paul Madrigal DO on 7/17/2025 at 11:37 AM

## 2025-07-22 NOTE — PROGRESS NOTES
IR Procedure at [facility]:  Left message on patient's wife Gaby's phone for patient to arrive at 1030 at [facility] on 7/31/2025 for his procedure at 1130. Patient has been given below instructions and take his medications as scheduled.    NPO at Midnight      Follow your directions as prescribed by the doctor for your procedure and medications.  3.   Consult your provider as to when to stop blood thinner  4.   Do not take any pain medication within 6 hours of your procedure  5.   Do not drink any alcoholic beverages or use any street drugs 24 hours before procedure.  6.   Please wear simple, loose fitting clothing to the hospital.  Do not bring valuables (money,             credit cards, checkbooks, etc.)     7.   If you  have a Living Will and Durable Power of  for Healthcare, please bring in a copy.  8.   Please bring picture ID,  insurance card, paperwork from the doctors office            (H & P, Consent,  & card for implantable devices).  9.   Report to the information desk on the ground floor.  10. Take a shower the night before or morning of your procedure, do not apply any lotion, oil or powder.  11. You will need a responsible adult

## 2025-07-24 ENCOUNTER — HOSPITAL ENCOUNTER (OUTPATIENT)
Dept: INTERVENTIONAL RADIOLOGY/VASCULAR | Age: 80
Discharge: HOME | End: 2025-07-24
Payer: COMMERCIAL

## 2025-07-24 VITALS
BODY MASS INDEX: 36.83 KG/M2 | HEIGHT: 68 IN | DIASTOLIC BLOOD PRESSURE: 77 MMHG | HEART RATE: 84 BPM | OXYGEN SATURATION: 96 % | WEIGHT: 243 LBS | SYSTOLIC BLOOD PRESSURE: 144 MMHG

## 2025-07-24 DIAGNOSIS — K74.60 OBSTRUCTIVE LIVER CIRRHOSIS (HCC): ICD-10-CM

## 2025-07-24 LAB
ALBUMIN FLD-MCNC: 0.8 G/DL
AMYLASE FLD-CCNC: 12 U/L
APPEARANCE FLD: NORMAL
BODY FLD TYPE: NORMAL
CLOT CHECK: NORMAL
COLOR FLD: YELLOW
GLUCOSE FLD-MCNC: 115 MG/DL
LDH FLD L TO P-CCNC: 42 U/L
MONOCYTES NFR FLD: 91 %
NEUTROPHILS NFR FLD: 9 %
PROT FLD-MCNC: 1.4 G/DL
RBC # FLD: <2000 CELLS/UL
SPECIMEN TYPE: NORMAL
WBC # FLD: 86 CELLS/UL

## 2025-07-24 PROCEDURE — P9047 ALBUMIN (HUMAN), 25%, 50ML: HCPCS

## 2025-07-24 PROCEDURE — 87070 CULTURE OTHR SPECIMN AEROBIC: CPT

## 2025-07-24 PROCEDURE — P9045 ALBUMIN (HUMAN), 5%, 250 ML: HCPCS | Performed by: RADIOLOGY

## 2025-07-24 PROCEDURE — C1729 CATH, DRAINAGE: HCPCS

## 2025-07-24 PROCEDURE — 84157 ASSAY OF PROTEIN OTHER: CPT

## 2025-07-24 PROCEDURE — 89051 BODY FLUID CELL COUNT: CPT

## 2025-07-24 PROCEDURE — 87075 CULTR BACTERIA EXCEPT BLOOD: CPT

## 2025-07-24 PROCEDURE — 49083 ABD PARACENTESIS W/IMAGING: CPT

## 2025-07-24 PROCEDURE — 83615 LACTATE (LD) (LDH) ENZYME: CPT

## 2025-07-24 PROCEDURE — 6360000002 HC RX W HCPCS

## 2025-07-24 PROCEDURE — 82150 ASSAY OF AMYLASE: CPT

## 2025-07-24 PROCEDURE — 82042 OTHER SOURCE ALBUMIN QUAN EA: CPT

## 2025-07-24 PROCEDURE — 82945 GLUCOSE OTHER FLUID: CPT

## 2025-07-24 PROCEDURE — 6360000002 HC RX W HCPCS: Performed by: RADIOLOGY

## 2025-07-24 PROCEDURE — 87205 SMEAR GRAM STAIN: CPT

## 2025-07-24 RX ORDER — ALBUMIN HUMAN 50 G/1000ML
SOLUTION INTRAVENOUS CONTINUOUS PRN
Status: COMPLETED | OUTPATIENT
Start: 2025-07-24 | End: 2025-07-24

## 2025-07-24 RX ORDER — LIDOCAINE HYDROCHLORIDE 10 MG/ML
INJECTION, SOLUTION EPIDURAL; INFILTRATION; INTRACAUDAL; PERINEURAL PRN
Status: COMPLETED | OUTPATIENT
Start: 2025-07-24 | End: 2025-07-24

## 2025-07-24 RX ADMIN — LIDOCAINE HYDROCHLORIDE 12 ML: 10 INJECTION, SOLUTION EPIDURAL; INFILTRATION; INTRACAUDAL; PERINEURAL at 11:13

## 2025-07-24 RX ADMIN — ALBUMIN (HUMAN) 37.5 G: 12.5 INJECTION, SOLUTION INTRAVENOUS at 11:35

## 2025-07-24 NOTE — PROGRESS NOTES
TRANSFER - OUT REPORT:    Verbal report given to JANNETH Gerardo(name) on Boby Glasgow being transferred to IR [facility](unit) for routine post-op  . Paracentesis performed by Dr. Fitch. 6850cc clear yellow ascitic fluid removed.  Vitals WNL. Albumin administered.  Dressing WNL.    Report consisted of patient's Situation, Background, Assessment and   Recommendations(SBAR).     Information from the following report(s) Nurse Handoff Report was reviewed with the receiving nurse.    Opportunity for questions and clarification was provided.      Patient transported with:   Registered Nurse

## 2025-07-24 NOTE — CONSULTS
Consult Interventional Radiology    Date:2025  Name:Boby Glasgow   :1945   MR#:[id number]    SEX:male     Planned procedure:  paracentesis  Indication: Ascites    H&P Status: H&P was reviewed, the patient was examined and no change has occurred in patient's condition since H&P was completed.    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  Initially treated with vancomycin but had a supratherapeutic level, hence, it was substituted by daptomycin.  Cumulative 6-week course of therapy will be completed on 3/13/2023.    Anxiety     Chronic heart failure, unspecified heart failure type (HCC) 2023    Cirrhosis, alcoholic (HCC)     Diabetes mellitus (HCC)     GERD (gastroesophageal reflux disease)     Hyperlipidemia     Hypertension     Meniere's disease     Portal hypertension (HCC)     Pulmonary nodules     Secondary esophageal varices without bleeding (HCC)     Sleep apnea     SOB (shortness of breath)     Thyroid disease         Past Surgical History:  Past Surgical History:   Procedure Laterality Date    APPENDECTOMY      BACK SURGERY      CHOLECYSTECTOMY      COLONOSCOPY      COLONOSCOPY N/A 2025    COLONOSCOPY POLYPECTOMY SNARE/BIOPSY performed by Lidia Green MD at [facility] ENDOSCOPY    ENDOSCOPY, COLON, DIAGNOSTIC      FOOT SURGERY      needle removed,not sure which foot, per wife.    TONSILLECTOMY      UPPER GASTROINTESTINAL ENDOSCOPY N/A 2022    EGD BIOPSY performed by Travis Phelan MD at [facility] ENDOSCOPY    UPPER GASTROINTESTINAL ENDOSCOPY N/A 2025    ESOPHAGOGASTRODUODENOSCOPY CONTROL HEMORRHAGE with APC with biopsies performed by Lidia Green MD at [facility] ENDOSCOPY    VASECTOMY         Social History:  Social History     Socioeconomic History    Marital status:      Spouse name: Not on file    Number of children: Not on file    Years of

## 2025-07-24 NOTE — PROGRESS NOTES
Pt arrived for paracentesis.  Vitals WNL.  IV inserted.  Labs up to date.  Pt is comfortable at this time.

## 2025-07-24 NOTE — BRIEF OP NOTE
Department of Interventional Radiology Post-Procedure Note      Date: 7/24/2025     Interventional Radiologist: Constantine    Procedure Performed:  paracentesis    H&P Status: H&P was reviewed, the patient was examined and no change has occurred in patient's condition since H&P was completed.    Pre-procedure Diagnosis:  ascites    Post-procedure Diagnosis:  Same    Sedation:  none    Contrast used:  none    Estimated blood loss:  Minimal    Preliminary Findings:  Successful    Complications:  none    Full Report to Follow.    Electronically signed by Jennifer Fitch MD on 7/24/2025 at 1:25 PM

## 2025-07-29 LAB — NON-GYN CYTOLOGY REPORT: NORMAL

## 2025-07-30 DIAGNOSIS — K72.90 DECOMPENSATION OF CIRRHOSIS OF LIVER (HCC): ICD-10-CM

## 2025-07-30 DIAGNOSIS — K74.60 DECOMPENSATION OF CIRRHOSIS OF LIVER (HCC): ICD-10-CM

## 2025-07-30 RX ORDER — ALBUTEROL SULFATE 90 UG/1
INHALANT RESPIRATORY (INHALATION)
Qty: 9 G | Refills: 0 | Status: SHIPPED | OUTPATIENT
Start: 2025-07-30

## 2025-07-31 ENCOUNTER — HOSPITAL ENCOUNTER (OUTPATIENT)
Dept: INTERVENTIONAL RADIOLOGY/VASCULAR | Age: 80
Discharge: HOME | End: 2025-07-31

## 2025-08-07 ENCOUNTER — APPOINTMENT (OUTPATIENT)
Dept: CT IMAGING | Age: 80
DRG: 442 | End: 2025-08-07
Payer: MEDICARE

## 2025-08-07 ENCOUNTER — APPOINTMENT (OUTPATIENT)
Dept: GENERAL RADIOLOGY | Age: 80
DRG: 442 | End: 2025-08-07
Payer: MEDICARE

## 2025-08-07 ENCOUNTER — HOSPITAL ENCOUNTER (OUTPATIENT)
Dept: INTERVENTIONAL RADIOLOGY/VASCULAR | Age: 80
Discharge: HOME | End: 2025-08-07

## 2025-08-07 ENCOUNTER — HOSPITAL ENCOUNTER (INPATIENT)
Age: 80
LOS: 2 days | Discharge: HOME | DRG: 442 | End: 2025-08-09
Attending: EMERGENCY MEDICINE | Admitting: STUDENT IN AN ORGANIZED HEALTH CARE EDUCATION/TRAINING PROGRAM
Payer: MEDICARE

## 2025-08-07 DIAGNOSIS — N18.30 ANEMIA DUE TO STAGE 3 CHRONIC KIDNEY DISEASE, UNSPECIFIED WHETHER STAGE 3A OR 3B CKD (HCC): Primary | ICD-10-CM

## 2025-08-07 DIAGNOSIS — R79.89 ELEVATED TROPONIN: ICD-10-CM

## 2025-08-07 DIAGNOSIS — N18.9 CHRONIC KIDNEY DISEASE, UNSPECIFIED CKD STAGE: ICD-10-CM

## 2025-08-07 DIAGNOSIS — K74.60 OBSTRUCTIVE LIVER CIRRHOSIS (HCC): ICD-10-CM

## 2025-08-07 DIAGNOSIS — R40.4 TRANSIENT ALTERATION OF AWARENESS: ICD-10-CM

## 2025-08-07 DIAGNOSIS — R59.0 HILAR LYMPHADENOPATHY: ICD-10-CM

## 2025-08-07 DIAGNOSIS — J90 PLEURAL EFFUSION: ICD-10-CM

## 2025-08-07 DIAGNOSIS — D63.1 ANEMIA DUE TO STAGE 3 CHRONIC KIDNEY DISEASE, UNSPECIFIED WHETHER STAGE 3A OR 3B CKD (HCC): Primary | ICD-10-CM

## 2025-08-07 DIAGNOSIS — D64.9 ANEMIA, UNSPECIFIED TYPE: ICD-10-CM

## 2025-08-07 LAB
ALBUMIN SERPL-MCNC: 3 G/DL (ref 3.4–5)
ALBUMIN/GLOB SERPL: 0.8 {RATIO} (ref 1.1–2.2)
ALP SERPL-CCNC: 168 U/L (ref 40–129)
ALT SERPL-CCNC: 18 U/L (ref 10–40)
AMMONIA PLAS-SCNC: 126 UMOL/L (ref 16–60)
ANION GAP SERPL CALCULATED.3IONS-SCNC: 8 MMOL/L (ref 9–17)
AST SERPL-CCNC: 36 U/L (ref 15–37)
BASOPHILS # BLD: 0.07 K/UL
BASOPHILS NFR BLD: 1 % (ref 0–1)
BILIRUB DIRECT SERPL-MCNC: 0.2 MG/DL (ref 0–0.3)
BILIRUB INDIRECT SERPL-MCNC: 0.4 MG/DL (ref 0–0.7)
BILIRUB SERPL-MCNC: 0.6 MG/DL (ref 0–1)
BNP SERPL-MCNC: 309 PG/ML (ref 0–450)
BUN SERPL-MCNC: 18 MG/DL (ref 7–20)
CALCIUM SERPL-MCNC: 9 MG/DL (ref 8.3–10.6)
CHLORIDE SERPL-SCNC: 98 MMOL/L (ref 99–110)
CO2 SERPL-SCNC: 28 MMOL/L (ref 21–32)
CREAT SERPL-MCNC: 1.5 MG/DL (ref 0.8–1.3)
EOSINOPHIL # BLD: 0.61 K/UL
EOSINOPHILS RELATIVE PERCENT: 12 % (ref 0–3)
ERYTHROCYTE [DISTWIDTH] IN BLOOD BY AUTOMATED COUNT: 20.2 % (ref 11.7–14.9)
GFR, ESTIMATED: 45 ML/MIN/1.73M2
GLUCOSE SERPL-MCNC: 88 MG/DL (ref 74–99)
HCT VFR BLD AUTO: 21.8 % (ref 42–52)
HGB BLD-MCNC: 6.6 G/DL (ref 13.5–18)
IMM GRANULOCYTES # BLD AUTO: 0.02 K/UL
IMM GRANULOCYTES NFR BLD: 0 %
LYMPHOCYTES NFR BLD: 0.36 K/UL
LYMPHOCYTES RELATIVE PERCENT: 7 % (ref 24–44)
MCH RBC QN AUTO: 24.3 PG (ref 27–31)
MCHC RBC AUTO-ENTMCNC: 30.3 G/DL (ref 32–36)
MCV RBC AUTO: 80.1 FL (ref 78–100)
MONOCYTES NFR BLD: 0.82 K/UL
MONOCYTES NFR BLD: 16 % (ref 0–5)
NEUTROPHILS NFR BLD: 62 % (ref 36–66)
NEUTS SEG NFR BLD: 3.11 K/UL
PLATELET # BLD AUTO: 185 K/UL (ref 140–440)
PMV BLD AUTO: 9.8 FL (ref 7.5–11.1)
POTASSIUM SERPL-SCNC: 4.6 MMOL/L (ref 3.5–5.1)
PROT SERPL-MCNC: 6.5 G/DL (ref 6.4–8.2)
RBC # BLD AUTO: 2.72 M/UL (ref 4.6–6.2)
SODIUM SERPL-SCNC: 134 MMOL/L (ref 136–145)
TROPONIN I SERPL HS-MCNC: 40 NG/L (ref 0–22)
TROPONIN I SERPL HS-MCNC: 48 NG/L (ref 0–22)
TSH SERPL DL<=0.05 MIU/L-ACNC: 1.68 UIU/ML (ref 0.27–4.2)
WBC OTHER # BLD: 5 K/UL (ref 4–10.5)

## 2025-08-07 PROCEDURE — 83880 ASSAY OF NATRIURETIC PEPTIDE: CPT

## 2025-08-07 PROCEDURE — P9016 RBC LEUKOCYTES REDUCED: HCPCS

## 2025-08-07 PROCEDURE — 82248 BILIRUBIN DIRECT: CPT

## 2025-08-07 PROCEDURE — 84484 ASSAY OF TROPONIN QUANT: CPT

## 2025-08-07 PROCEDURE — 71260 CT THORAX DX C+: CPT

## 2025-08-07 PROCEDURE — 6360000002 HC RX W HCPCS: Performed by: STUDENT IN AN ORGANIZED HEALTH CARE EDUCATION/TRAINING PROGRAM

## 2025-08-07 PROCEDURE — 82140 ASSAY OF AMMONIA: CPT

## 2025-08-07 PROCEDURE — 84443 ASSAY THYROID STIM HORMONE: CPT

## 2025-08-07 PROCEDURE — 86920 COMPATIBILITY TEST SPIN: CPT

## 2025-08-07 PROCEDURE — 6360000004 HC RX CONTRAST MEDICATION: Performed by: EMERGENCY MEDICINE

## 2025-08-07 PROCEDURE — 94761 N-INVAS EAR/PLS OXIMETRY MLT: CPT

## 2025-08-07 PROCEDURE — 86901 BLOOD TYPING SEROLOGIC RH(D): CPT

## 2025-08-07 PROCEDURE — 86850 RBC ANTIBODY SCREEN: CPT

## 2025-08-07 PROCEDURE — 99285 EMERGENCY DEPT VISIT HI MDM: CPT

## 2025-08-07 PROCEDURE — 85025 COMPLETE CBC W/AUTO DIFF WBC: CPT

## 2025-08-07 PROCEDURE — 80053 COMPREHEN METABOLIC PANEL: CPT

## 2025-08-07 PROCEDURE — 93005 ELECTROCARDIOGRAM TRACING: CPT | Performed by: EMERGENCY MEDICINE

## 2025-08-07 PROCEDURE — 36430 TRANSFUSION BLD/BLD COMPNT: CPT

## 2025-08-07 PROCEDURE — 72125 CT NECK SPINE W/O DYE: CPT

## 2025-08-07 PROCEDURE — 6370000000 HC RX 637 (ALT 250 FOR IP): Performed by: STUDENT IN AN ORGANIZED HEALTH CARE EDUCATION/TRAINING PROGRAM

## 2025-08-07 PROCEDURE — 2580000003 HC RX 258: Performed by: STUDENT IN AN ORGANIZED HEALTH CARE EDUCATION/TRAINING PROGRAM

## 2025-08-07 PROCEDURE — 70450 CT HEAD/BRAIN W/O DYE: CPT

## 2025-08-07 PROCEDURE — 71045 X-RAY EXAM CHEST 1 VIEW: CPT

## 2025-08-07 PROCEDURE — 73502 X-RAY EXAM HIP UNI 2-3 VIEWS: CPT

## 2025-08-07 PROCEDURE — 2140000000 HC CCU INTERMEDIATE R&B

## 2025-08-07 PROCEDURE — 30233N1 TRANSFUSION OF NONAUTOLOGOUS RED BLOOD CELLS INTO PERIPHERAL VEIN, PERCUTANEOUS APPROACH: ICD-10-PCS | Performed by: STUDENT IN AN ORGANIZED HEALTH CARE EDUCATION/TRAINING PROGRAM

## 2025-08-07 PROCEDURE — 86900 BLOOD TYPING SEROLOGIC ABO: CPT

## 2025-08-07 RX ORDER — SPIRONOLACTONE 50 MG/1
100 TABLET, FILM COATED ORAL 2 TIMES DAILY
Status: DISCONTINUED | OUTPATIENT
Start: 2025-08-07 | End: 2025-08-09 | Stop reason: HOSPADM

## 2025-08-07 RX ORDER — LACTULOSE 10 G/15ML
20 SOLUTION ORAL EVERY 6 HOURS SCHEDULED
Status: DISCONTINUED | OUTPATIENT
Start: 2025-08-07 | End: 2025-08-09 | Stop reason: HOSPADM

## 2025-08-07 RX ORDER — SODIUM CHLORIDE 9 MG/ML
INJECTION, SOLUTION INTRAVENOUS PRN
Status: DISCONTINUED | OUTPATIENT
Start: 2025-08-07 | End: 2025-08-09 | Stop reason: HOSPADM

## 2025-08-07 RX ORDER — POTASSIUM CHLORIDE 7.45 MG/ML
10 INJECTION INTRAVENOUS PRN
Status: DISCONTINUED | OUTPATIENT
Start: 2025-08-07 | End: 2025-08-09 | Stop reason: HOSPADM

## 2025-08-07 RX ORDER — PANTOPRAZOLE SODIUM 40 MG/10ML
40 INJECTION, POWDER, LYOPHILIZED, FOR SOLUTION INTRAVENOUS 2 TIMES DAILY
Status: DISCONTINUED | OUTPATIENT
Start: 2025-08-07 | End: 2025-08-09 | Stop reason: HOSPADM

## 2025-08-07 RX ORDER — MAGNESIUM 200 MG
200 TABLET ORAL DAILY
COMMUNITY

## 2025-08-07 RX ORDER — FUROSEMIDE 10 MG/ML
40 INJECTION INTRAMUSCULAR; INTRAVENOUS DAILY
Status: DISCONTINUED | OUTPATIENT
Start: 2025-08-07 | End: 2025-08-09 | Stop reason: HOSPADM

## 2025-08-07 RX ORDER — ONDANSETRON 4 MG/1
4 TABLET, ORALLY DISINTEGRATING ORAL EVERY 8 HOURS PRN
Status: DISCONTINUED | OUTPATIENT
Start: 2025-08-07 | End: 2025-08-09 | Stop reason: HOSPADM

## 2025-08-07 RX ORDER — ALBUTEROL SULFATE 90 UG/1
2 INHALANT RESPIRATORY (INHALATION) EVERY 6 HOURS PRN
Status: DISCONTINUED | OUTPATIENT
Start: 2025-08-07 | End: 2025-08-09 | Stop reason: HOSPADM

## 2025-08-07 RX ORDER — DULOXETIN HYDROCHLORIDE 30 MG/1
60 CAPSULE, DELAYED RELEASE ORAL DAILY
Status: DISCONTINUED | OUTPATIENT
Start: 2025-08-08 | End: 2025-08-09 | Stop reason: HOSPADM

## 2025-08-07 RX ORDER — CARVEDILOL 6.25 MG/1
12.5 TABLET ORAL 2 TIMES DAILY WITH MEALS
Status: DISCONTINUED | OUTPATIENT
Start: 2025-08-07 | End: 2025-08-09 | Stop reason: HOSPADM

## 2025-08-07 RX ORDER — SODIUM CHLORIDE 0.9 % (FLUSH) 0.9 %
5-40 SYRINGE (ML) INJECTION PRN
Status: DISCONTINUED | OUTPATIENT
Start: 2025-08-07 | End: 2025-08-09 | Stop reason: HOSPADM

## 2025-08-07 RX ORDER — ACETAMINOPHEN 650 MG/1
650 SUPPOSITORY RECTAL EVERY 6 HOURS PRN
Status: DISCONTINUED | OUTPATIENT
Start: 2025-08-07 | End: 2025-08-09 | Stop reason: HOSPADM

## 2025-08-07 RX ORDER — POTASSIUM CHLORIDE 1500 MG/1
40 TABLET, EXTENDED RELEASE ORAL PRN
Status: DISCONTINUED | OUTPATIENT
Start: 2025-08-07 | End: 2025-08-09 | Stop reason: HOSPADM

## 2025-08-07 RX ORDER — IOPAMIDOL 755 MG/ML
80 INJECTION, SOLUTION INTRAVASCULAR
Status: COMPLETED | OUTPATIENT
Start: 2025-08-07 | End: 2025-08-07

## 2025-08-07 RX ORDER — POLYETHYLENE GLYCOL 3350 17 G/17G
17 POWDER, FOR SOLUTION ORAL DAILY PRN
Status: DISCONTINUED | OUTPATIENT
Start: 2025-08-07 | End: 2025-08-09 | Stop reason: HOSPADM

## 2025-08-07 RX ORDER — LEVOTHYROXINE SODIUM 50 UG/1
50 TABLET ORAL DAILY
Status: DISCONTINUED | OUTPATIENT
Start: 2025-08-08 | End: 2025-08-09 | Stop reason: HOSPADM

## 2025-08-07 RX ORDER — ONDANSETRON 2 MG/ML
4 INJECTION INTRAMUSCULAR; INTRAVENOUS EVERY 6 HOURS PRN
Status: DISCONTINUED | OUTPATIENT
Start: 2025-08-07 | End: 2025-08-09 | Stop reason: HOSPADM

## 2025-08-07 RX ORDER — MAGNESIUM SULFATE IN WATER 40 MG/ML
2000 INJECTION, SOLUTION INTRAVENOUS PRN
Status: DISCONTINUED | OUTPATIENT
Start: 2025-08-07 | End: 2025-08-09 | Stop reason: HOSPADM

## 2025-08-07 RX ORDER — ACETAMINOPHEN 325 MG/1
650 TABLET ORAL EVERY 6 HOURS PRN
Status: DISCONTINUED | OUTPATIENT
Start: 2025-08-07 | End: 2025-08-09 | Stop reason: HOSPADM

## 2025-08-07 RX ORDER — SODIUM CHLORIDE 0.9 % (FLUSH) 0.9 %
5-40 SYRINGE (ML) INJECTION EVERY 12 HOURS SCHEDULED
Status: DISCONTINUED | OUTPATIENT
Start: 2025-08-07 | End: 2025-08-09 | Stop reason: HOSPADM

## 2025-08-07 RX ADMIN — LACTULOSE 20 G: 20 SOLUTION ORAL at 23:45

## 2025-08-07 RX ADMIN — SPIRONOLACTONE 100 MG: 50 TABLET ORAL at 21:00

## 2025-08-07 RX ADMIN — CEFTRIAXONE SODIUM 2000 MG: 2 INJECTION, POWDER, FOR SOLUTION INTRAMUSCULAR; INTRAVENOUS at 21:13

## 2025-08-07 RX ADMIN — PANTOPRAZOLE SODIUM 40 MG: 40 INJECTION, POWDER, FOR SOLUTION INTRAVENOUS at 21:00

## 2025-08-07 RX ADMIN — LACTULOSE 20 G: 20 SOLUTION ORAL at 21:04

## 2025-08-07 RX ADMIN — FUROSEMIDE 40 MG: 10 INJECTION, SOLUTION INTRAMUSCULAR; INTRAVENOUS at 21:04

## 2025-08-07 RX ADMIN — RIFAXIMIN 550 MG: 550 TABLET ORAL at 21:00

## 2025-08-07 RX ADMIN — IOPAMIDOL 80 ML: 755 INJECTION, SOLUTION INTRAVENOUS at 13:48

## 2025-08-07 RX ADMIN — CARVEDILOL 12.5 MG: 6.25 TABLET, FILM COATED ORAL at 21:04

## 2025-08-07 ASSESSMENT — PAIN - FUNCTIONAL ASSESSMENT: PAIN_FUNCTIONAL_ASSESSMENT: NONE - DENIES PAIN

## 2025-08-08 ENCOUNTER — APPOINTMENT (OUTPATIENT)
Dept: GENERAL RADIOLOGY | Age: 80
DRG: 442 | End: 2025-08-08
Payer: MEDICARE

## 2025-08-08 ENCOUNTER — APPOINTMENT (OUTPATIENT)
Dept: INTERVENTIONAL RADIOLOGY/VASCULAR | Age: 80
DRG: 442 | End: 2025-08-08
Attending: STUDENT IN AN ORGANIZED HEALTH CARE EDUCATION/TRAINING PROGRAM
Payer: MEDICARE

## 2025-08-08 PROBLEM — D63.1 ANEMIA DUE TO STAGE 3 CHRONIC KIDNEY DISEASE (HCC): Status: ACTIVE | Noted: 2025-08-08

## 2025-08-08 PROBLEM — K76.82 HEPATIC ENCEPHALOPATHY (HCC): Status: ACTIVE | Noted: 2025-08-08

## 2025-08-08 PROBLEM — N18.30 ANEMIA DUE TO STAGE 3 CHRONIC KIDNEY DISEASE (HCC): Status: ACTIVE | Noted: 2025-08-08

## 2025-08-08 PROBLEM — K31.819 GAVE (GASTRIC ANTRAL VASCULAR ECTASIA): Status: ACTIVE | Noted: 2025-08-08

## 2025-08-08 LAB
ABO/RH: NORMAL
ALBUMIN FLD-MCNC: 0.8 G/DL
ALBUMIN FLD-MCNC: 0.8 G/DL
ALBUMIN SERPL-MCNC: 2.9 G/DL (ref 3.4–5)
ALBUMIN/GLOB SERPL: 0.7 {RATIO} (ref 1.1–2.2)
ALP SERPL-CCNC: 166 U/L (ref 40–129)
ALT SERPL-CCNC: 14 U/L (ref 10–40)
AMYLASE FLD-CCNC: 12 U/L
ANION GAP SERPL CALCULATED.3IONS-SCNC: 11 MMOL/L (ref 9–17)
ANTIBODY SCREEN: NEGATIVE
APPEARANCE FLD: NORMAL
APPEARANCE FLD: NORMAL
AST SERPL-CCNC: 33 U/L (ref 15–37)
BASOPHILS # BLD: 0.05 K/UL
BASOPHILS NFR BLD: 1 % (ref 0–1)
BILIRUB SERPL-MCNC: 0.8 MG/DL (ref 0–1)
BLASTS NFR FLD: 0 %
BLOOD BANK BLOOD PRODUCT EXPIRATION DATE: NORMAL
BLOOD BANK BLOOD PRODUCT EXPIRATION DATE: NORMAL
BLOOD BANK DISPENSE STATUS: NORMAL
BLOOD BANK DISPENSE STATUS: NORMAL
BLOOD BANK ISBT PRODUCT BLOOD TYPE: 600
BLOOD BANK ISBT PRODUCT BLOOD TYPE: 600
BLOOD BANK PRODUCT CODE: NORMAL
BLOOD BANK PRODUCT CODE: NORMAL
BLOOD BANK SAMPLE EXPIRATION: NORMAL
BLOOD BANK UNIT TYPE AND RH: NORMAL
BLOOD BANK UNIT TYPE AND RH: NORMAL
BODY FLD TYPE: NORMAL
BODY FLD TYPE: NORMAL
BPU ID: NORMAL
BPU ID: NORMAL
BUN SERPL-MCNC: 15 MG/DL (ref 7–20)
CALCIUM SERPL-MCNC: 9.1 MG/DL (ref 8.3–10.6)
CHLORIDE SERPL-SCNC: 101 MMOL/L (ref 99–110)
CLOT CHECK: NORMAL
CLOT CHECK: NORMAL
CO2 SERPL-SCNC: 26 MMOL/L (ref 21–32)
COLOR FLD: YELLOW
COLOR FLD: YELLOW
COMPONENT: NORMAL
COMPONENT: NORMAL
CREAT SERPL-MCNC: 1.4 MG/DL (ref 0.8–1.3)
CROSSMATCH RESULT: NORMAL
CROSSMATCH RESULT: NORMAL
EKG ATRIAL RATE: 83 BPM
EKG DIAGNOSIS: NORMAL
EKG P AXIS: 23 DEGREES
EKG P-R INTERVAL: 134 MS
EKG Q-T INTERVAL: 376 MS
EKG QRS DURATION: 72 MS
EKG QTC CALCULATION (BAZETT): 441 MS
EKG R AXIS: 6 DEGREES
EKG T AXIS: 3 DEGREES
EKG VENTRICULAR RATE: 83 BPM
EOSINOPHIL # BLD: 0.56 K/UL
EOSINOPHIL NFR FLD: 0 %
EOSINOPHILS RELATIVE PERCENT: 11 % (ref 0–3)
ERYTHROCYTE [DISTWIDTH] IN BLOOD BY AUTOMATED COUNT: 20 % (ref 11.7–14.9)
GFR, ESTIMATED: 50 ML/MIN/1.73M2
GLUCOSE FLD-MCNC: 125 MG/DL
GLUCOSE SERPL-MCNC: 107 MG/DL (ref 74–99)
HCT VFR BLD AUTO: 32.7 % (ref 42–52)
HGB BLD-MCNC: 9.7 G/DL (ref 13.5–18)
IMM GRANULOCYTES # BLD AUTO: 0.01 K/UL
IMM GRANULOCYTES NFR BLD: 0 %
INR PPP: 1.2
LDH FLD L TO P-CCNC: 46 U/L
LDH FLD L TO P-CCNC: 47 U/L
LDH SERPL-CCNC: 171 U/L (ref 100–190)
LYMPHOCYTES NFR BLD: 0.35 K/UL
LYMPHOCYTES NFR FLD: 5 %
LYMPHOCYTES RELATIVE PERCENT: 7 % (ref 24–44)
MAGNESIUM SERPL-MCNC: 1.9 MG/DL (ref 1.8–2.4)
MCH RBC QN AUTO: 24.9 PG (ref 27–31)
MCHC RBC AUTO-ENTMCNC: 29.7 G/DL (ref 32–36)
MCV RBC AUTO: 84.1 FL (ref 78–100)
MESOTHELIAL CELLS BODY FLUID: 1 %
MONOCYTES NFR BLD: 0.78 K/UL
MONOCYTES NFR BLD: 16 % (ref 0–5)
MONOCYTES NFR FLD: 90 %
MONOCYTES NFR FLD: 92 %
NEUTROPHILS NFR BLD: 65 % (ref 36–66)
NEUTROPHILS NFR FLD: 10 %
NEUTROPHILS NFR FLD: 3 %
NEUTS SEG NFR BLD: 3.24 K/UL
PH FLUID: 8 (ref 6.5–7.5)
PHOSPHATE SERPL-MCNC: 2.4 MG/DL (ref 2.5–4.9)
PLATELET # BLD AUTO: 189 K/UL (ref 140–440)
PMV BLD AUTO: 10.3 FL (ref 7.5–11.1)
POTASSIUM SERPL-SCNC: 3.8 MMOL/L (ref 3.5–5.1)
PROT FLD-MCNC: 1.5 G/DL
PROT FLD-MCNC: 1.5 G/DL
PROT SERPL-MCNC: 7 G/DL (ref 6.4–8.2)
PROT SERPL-MCNC: 7 G/DL (ref 6.4–8.2)
PROTHROMBIN TIME: 15.6 SEC (ref 11.7–14.5)
RBC # BLD AUTO: 3.89 M/UL (ref 4.6–6.2)
RBC # FLD: <2000 CELLS/UL
RBC # FLD: <2000 CELLS/UL
SODIUM SERPL-SCNC: 138 MMOL/L (ref 136–145)
SPECIMEN TYPE: ABNORMAL
SPECIMEN TYPE: NORMAL
TRANSFUSION STATUS: NORMAL
TRANSFUSION STATUS: NORMAL
UNIDENT CELLS NFR FLD: 0 %
UNIT DIVISION: 0
UNIT DIVISION: 0
UNIT ISSUE DATE/TIME: NORMAL
UNIT ISSUE DATE/TIME: NORMAL
WBC # FLD: 66 CELLS/UL
WBC # FLD: 73 CELLS/UL
WBC OTHER # BLD: 5 K/UL (ref 4–10.5)

## 2025-08-08 PROCEDURE — 83615 LACTATE (LD) (LDH) ENZYME: CPT

## 2025-08-08 PROCEDURE — 80053 COMPREHEN METABOLIC PANEL: CPT

## 2025-08-08 PROCEDURE — 83735 ASSAY OF MAGNESIUM: CPT

## 2025-08-08 PROCEDURE — 82945 GLUCOSE OTHER FLUID: CPT

## 2025-08-08 PROCEDURE — 83986 ASSAY PH BODY FLUID NOS: CPT

## 2025-08-08 PROCEDURE — C1729 CATH, DRAINAGE: HCPCS

## 2025-08-08 PROCEDURE — 49083 ABD PARACENTESIS W/IMAGING: CPT | Performed by: RADIOLOGY

## 2025-08-08 PROCEDURE — 71045 X-RAY EXAM CHEST 1 VIEW: CPT

## 2025-08-08 PROCEDURE — 94761 N-INVAS EAR/PLS OXIMETRY MLT: CPT

## 2025-08-08 PROCEDURE — 82042 OTHER SOURCE ALBUMIN QUAN EA: CPT

## 2025-08-08 PROCEDURE — 85610 PROTHROMBIN TIME: CPT

## 2025-08-08 PROCEDURE — 36415 COLL VENOUS BLD VENIPUNCTURE: CPT

## 2025-08-08 PROCEDURE — 0W993ZZ DRAINAGE OF RIGHT PLEURAL CAVITY, PERCUTANEOUS APPROACH: ICD-10-PCS | Performed by: RADIOLOGY

## 2025-08-08 PROCEDURE — 2140000000 HC CCU INTERMEDIATE R&B

## 2025-08-08 PROCEDURE — 84100 ASSAY OF PHOSPHORUS: CPT

## 2025-08-08 PROCEDURE — 2580000003 HC RX 258: Performed by: STUDENT IN AN ORGANIZED HEALTH CARE EDUCATION/TRAINING PROGRAM

## 2025-08-08 PROCEDURE — 88341 IMHCHEM/IMCYTCHM EA ADD ANTB: CPT | Performed by: PATHOLOGY

## 2025-08-08 PROCEDURE — 2500000003 HC RX 250 WO HCPCS: Performed by: STUDENT IN AN ORGANIZED HEALTH CARE EDUCATION/TRAINING PROGRAM

## 2025-08-08 PROCEDURE — 84155 ASSAY OF PROTEIN SERUM: CPT

## 2025-08-08 PROCEDURE — 82150 ASSAY OF AMYLASE: CPT

## 2025-08-08 PROCEDURE — 6360000002 HC RX W HCPCS

## 2025-08-08 PROCEDURE — 84157 ASSAY OF PROTEIN OTHER: CPT

## 2025-08-08 PROCEDURE — 99223 1ST HOSP IP/OBS HIGH 75: CPT | Performed by: INTERNAL MEDICINE

## 2025-08-08 PROCEDURE — P9047 ALBUMIN (HUMAN), 25%, 50ML: HCPCS | Performed by: RADIOLOGY

## 2025-08-08 PROCEDURE — 2709999900 IR GUIDED THORACENTESIS PLEURAL

## 2025-08-08 PROCEDURE — 88342 IMHCHEM/IMCYTCHM 1ST ANTB: CPT | Performed by: PATHOLOGY

## 2025-08-08 PROCEDURE — 49083 ABD PARACENTESIS W/IMAGING: CPT

## 2025-08-08 PROCEDURE — P9047 ALBUMIN (HUMAN), 25%, 50ML: HCPCS

## 2025-08-08 PROCEDURE — 87205 SMEAR GRAM STAIN: CPT

## 2025-08-08 PROCEDURE — 85025 COMPLETE CBC W/AUTO DIFF WBC: CPT

## 2025-08-08 PROCEDURE — 88305 TISSUE EXAM BY PATHOLOGIST: CPT

## 2025-08-08 PROCEDURE — 88305 TISSUE EXAM BY PATHOLOGIST: CPT | Performed by: PATHOLOGY

## 2025-08-08 PROCEDURE — 32555 ASPIRATE PLEURA W/ IMAGING: CPT

## 2025-08-08 PROCEDURE — 6370000000 HC RX 637 (ALT 250 FOR IP): Performed by: STUDENT IN AN ORGANIZED HEALTH CARE EDUCATION/TRAINING PROGRAM

## 2025-08-08 PROCEDURE — 88108 CYTOPATH CONCENTRATE TECH: CPT | Performed by: PATHOLOGY

## 2025-08-08 PROCEDURE — 32555 ASPIRATE PLEURA W/ IMAGING: CPT | Performed by: RADIOLOGY

## 2025-08-08 PROCEDURE — 93010 ELECTROCARDIOGRAM REPORT: CPT | Performed by: INTERNAL MEDICINE

## 2025-08-08 PROCEDURE — 6360000002 HC RX W HCPCS: Performed by: RADIOLOGY

## 2025-08-08 PROCEDURE — 89051 BODY FLUID CELL COUNT: CPT

## 2025-08-08 PROCEDURE — 0W9G3ZZ DRAINAGE OF PERITONEAL CAVITY, PERCUTANEOUS APPROACH: ICD-10-PCS | Performed by: RADIOLOGY

## 2025-08-08 PROCEDURE — 6360000002 HC RX W HCPCS: Performed by: STUDENT IN AN ORGANIZED HEALTH CARE EDUCATION/TRAINING PROGRAM

## 2025-08-08 PROCEDURE — 87075 CULTR BACTERIA EXCEPT BLOOD: CPT

## 2025-08-08 PROCEDURE — 87070 CULTURE OTHR SPECIMN AEROBIC: CPT

## 2025-08-08 PROCEDURE — 88108 CYTOPATH CONCENTRATE TECH: CPT

## 2025-08-08 RX ORDER — ALBUMIN (HUMAN) 12.5 G/50ML
SOLUTION INTRAVENOUS CONTINUOUS PRN
Status: COMPLETED | OUTPATIENT
Start: 2025-08-08 | End: 2025-08-08

## 2025-08-08 RX ORDER — LIDOCAINE HYDROCHLORIDE 10 MG/ML
INJECTION, SOLUTION EPIDURAL; INFILTRATION; INTRACAUDAL; PERINEURAL PRN
Status: COMPLETED | OUTPATIENT
Start: 2025-08-08 | End: 2025-08-08

## 2025-08-08 RX ADMIN — LIDOCAINE HYDROCHLORIDE 10 ML: 10 INJECTION, SOLUTION EPIDURAL; INFILTRATION; INTRACAUDAL; PERINEURAL at 08:42

## 2025-08-08 RX ADMIN — LACTULOSE 20 G: 20 SOLUTION ORAL at 16:17

## 2025-08-08 RX ADMIN — LIDOCAINE HYDROCHLORIDE 10 ML: 10 INJECTION, SOLUTION EPIDURAL; INFILTRATION; INTRACAUDAL; PERINEURAL at 09:13

## 2025-08-08 RX ADMIN — CEFTRIAXONE SODIUM 2000 MG: 2 INJECTION, POWDER, FOR SOLUTION INTRAMUSCULAR; INTRAVENOUS at 16:40

## 2025-08-08 RX ADMIN — CARVEDILOL 12.5 MG: 6.25 TABLET, FILM COATED ORAL at 09:56

## 2025-08-08 RX ADMIN — RIFAXIMIN 550 MG: 550 TABLET ORAL at 09:56

## 2025-08-08 RX ADMIN — PANTOPRAZOLE SODIUM 40 MG: 40 INJECTION, POWDER, FOR SOLUTION INTRAVENOUS at 09:56

## 2025-08-08 RX ADMIN — FUROSEMIDE 40 MG: 10 INJECTION, SOLUTION INTRAMUSCULAR; INTRAVENOUS at 09:56

## 2025-08-08 RX ADMIN — SPIRONOLACTONE 100 MG: 50 TABLET ORAL at 20:39

## 2025-08-08 RX ADMIN — CARVEDILOL 12.5 MG: 6.25 TABLET, FILM COATED ORAL at 16:17

## 2025-08-08 RX ADMIN — ALBUMIN (HUMAN) 50 G: 0.25 INJECTION, SOLUTION INTRAVENOUS at 09:23

## 2025-08-08 RX ADMIN — SODIUM CHLORIDE, PRESERVATIVE FREE 10 ML: 5 INJECTION INTRAVENOUS at 09:57

## 2025-08-08 RX ADMIN — RIFAXIMIN 550 MG: 550 TABLET ORAL at 20:39

## 2025-08-08 RX ADMIN — SODIUM CHLORIDE, PRESERVATIVE FREE 10 ML: 5 INJECTION INTRAVENOUS at 20:40

## 2025-08-08 RX ADMIN — SPIRONOLACTONE 100 MG: 50 TABLET ORAL at 09:56

## 2025-08-08 RX ADMIN — PANTOPRAZOLE SODIUM 40 MG: 40 INJECTION, POWDER, FOR SOLUTION INTRAVENOUS at 20:39

## 2025-08-08 RX ADMIN — LEVOTHYROXINE SODIUM 50 MCG: 0.05 TABLET ORAL at 05:40

## 2025-08-08 RX ADMIN — LACTULOSE 20 G: 20 SOLUTION ORAL at 05:40

## 2025-08-08 RX ADMIN — LACTULOSE 20 G: 20 SOLUTION ORAL at 12:06

## 2025-08-08 RX ADMIN — DULOXETINE 60 MG: 30 CAPSULE, DELAYED RELEASE ORAL at 09:56

## 2025-08-09 VITALS
HEART RATE: 76 BPM | OXYGEN SATURATION: 98 % | HEIGHT: 68 IN | BODY MASS INDEX: 33.65 KG/M2 | WEIGHT: 222 LBS | DIASTOLIC BLOOD PRESSURE: 77 MMHG | RESPIRATION RATE: 17 BRPM | SYSTOLIC BLOOD PRESSURE: 145 MMHG | TEMPERATURE: 97.4 F

## 2025-08-09 LAB
ANION GAP SERPL CALCULATED.3IONS-SCNC: 10 MMOL/L (ref 9–17)
BUN SERPL-MCNC: 15 MG/DL (ref 7–20)
CALCIUM SERPL-MCNC: 9.5 MG/DL (ref 8.3–10.6)
CHLORIDE SERPL-SCNC: 104 MMOL/L (ref 99–110)
CO2 SERPL-SCNC: 28 MMOL/L (ref 21–32)
CREAT SERPL-MCNC: 1.5 MG/DL (ref 0.8–1.3)
ERYTHROCYTE [DISTWIDTH] IN BLOOD BY AUTOMATED COUNT: 20.8 % (ref 11.7–14.9)
GFR, ESTIMATED: 46 ML/MIN/1.73M2
GLUCOSE SERPL-MCNC: 112 MG/DL (ref 74–99)
HCT VFR BLD AUTO: 32 % (ref 42–52)
HGB BLD-MCNC: 9.6 G/DL (ref 13.5–18)
MAGNESIUM SERPL-MCNC: 1.9 MG/DL (ref 1.8–2.4)
MCH RBC QN AUTO: 24.9 PG (ref 27–31)
MCHC RBC AUTO-ENTMCNC: 30 G/DL (ref 32–36)
MCV RBC AUTO: 82.9 FL (ref 78–100)
PHOSPHATE SERPL-MCNC: 2.5 MG/DL (ref 2.5–4.9)
PLATELET # BLD AUTO: 206 K/UL (ref 140–440)
PMV BLD AUTO: 10.3 FL (ref 7.5–11.1)
POTASSIUM SERPL-SCNC: 3.7 MMOL/L (ref 3.5–5.1)
RBC # BLD AUTO: 3.86 M/UL (ref 4.6–6.2)
SODIUM SERPL-SCNC: 141 MMOL/L (ref 136–145)
WBC OTHER # BLD: 6.2 K/UL (ref 4–10.5)

## 2025-08-09 PROCEDURE — 97166 OT EVAL MOD COMPLEX 45 MIN: CPT

## 2025-08-09 PROCEDURE — 84100 ASSAY OF PHOSPHORUS: CPT

## 2025-08-09 PROCEDURE — 85027 COMPLETE CBC AUTOMATED: CPT

## 2025-08-09 PROCEDURE — 80048 BASIC METABOLIC PNL TOTAL CA: CPT

## 2025-08-09 PROCEDURE — 36415 COLL VENOUS BLD VENIPUNCTURE: CPT

## 2025-08-09 PROCEDURE — 97530 THERAPEUTIC ACTIVITIES: CPT

## 2025-08-09 PROCEDURE — 6360000002 HC RX W HCPCS: Performed by: STUDENT IN AN ORGANIZED HEALTH CARE EDUCATION/TRAINING PROGRAM

## 2025-08-09 PROCEDURE — 6370000000 HC RX 637 (ALT 250 FOR IP): Performed by: STUDENT IN AN ORGANIZED HEALTH CARE EDUCATION/TRAINING PROGRAM

## 2025-08-09 PROCEDURE — 83735 ASSAY OF MAGNESIUM: CPT

## 2025-08-09 PROCEDURE — 94761 N-INVAS EAR/PLS OXIMETRY MLT: CPT

## 2025-08-09 PROCEDURE — 2500000003 HC RX 250 WO HCPCS: Performed by: STUDENT IN AN ORGANIZED HEALTH CARE EDUCATION/TRAINING PROGRAM

## 2025-08-09 RX ORDER — CARVEDILOL 12.5 MG/1
12.5 TABLET ORAL 2 TIMES DAILY WITH MEALS
Qty: 60 TABLET | Refills: 0 | Status: SHIPPED | OUTPATIENT
Start: 2025-08-09

## 2025-08-09 RX ORDER — LACTULOSE 10 G/15ML
SOLUTION ORAL
Qty: 270 ML | Refills: 0 | Status: SHIPPED | OUTPATIENT
Start: 2025-08-09

## 2025-08-09 RX ADMIN — LACTULOSE 20 G: 20 SOLUTION ORAL at 00:14

## 2025-08-09 RX ADMIN — SODIUM CHLORIDE, PRESERVATIVE FREE 10 ML: 5 INJECTION INTRAVENOUS at 08:03

## 2025-08-09 RX ADMIN — LEVOTHYROXINE SODIUM 50 MCG: 0.05 TABLET ORAL at 06:14

## 2025-08-09 RX ADMIN — LACTULOSE 20 G: 20 SOLUTION ORAL at 12:18

## 2025-08-09 RX ADMIN — FUROSEMIDE 40 MG: 10 INJECTION, SOLUTION INTRAMUSCULAR; INTRAVENOUS at 08:03

## 2025-08-09 RX ADMIN — DULOXETINE 60 MG: 30 CAPSULE, DELAYED RELEASE ORAL at 08:03

## 2025-08-09 RX ADMIN — PANTOPRAZOLE SODIUM 40 MG: 40 INJECTION, POWDER, FOR SOLUTION INTRAVENOUS at 08:03

## 2025-08-09 RX ADMIN — RIFAXIMIN 550 MG: 550 TABLET ORAL at 08:03

## 2025-08-09 RX ADMIN — LACTULOSE 20 G: 20 SOLUTION ORAL at 06:15

## 2025-08-09 RX ADMIN — SPIRONOLACTONE 100 MG: 50 TABLET ORAL at 08:03

## 2025-08-09 RX ADMIN — CARVEDILOL 12.5 MG: 6.25 TABLET, FILM COATED ORAL at 08:02

## 2025-08-09 ASSESSMENT — PAIN SCALES - GENERAL
PAINLEVEL_OUTOF10: 0
PAINLEVEL_OUTOF10: 0

## 2025-08-10 LAB
MICROORGANISM SPEC CULT: NORMAL
MICROORGANISM SPEC CULT: NORMAL
MICROORGANISM/AGENT SPEC: NORMAL
SERVICE CMNT-IMP: NORMAL
SERVICE CMNT-IMP: NORMAL
SPECIMEN DESCRIPTION: NORMAL
SPECIMEN DESCRIPTION: NORMAL

## 2025-08-13 LAB — NON-GYN CYTOLOGY REPORT: NORMAL

## 2025-08-14 ENCOUNTER — HOSPITAL ENCOUNTER (OUTPATIENT)
Dept: INTERVENTIONAL RADIOLOGY/VASCULAR | Age: 80
Discharge: HOME | End: 2025-08-14
Payer: COMMERCIAL

## 2025-08-14 VITALS — HEART RATE: 78 BPM | OXYGEN SATURATION: 96 % | DIASTOLIC BLOOD PRESSURE: 70 MMHG | SYSTOLIC BLOOD PRESSURE: 140 MMHG

## 2025-08-14 DIAGNOSIS — K74.69 FLORID CIRRHOSIS (HCC): ICD-10-CM

## 2025-08-14 LAB
ALBUMIN FLD-MCNC: 0.7 G/DL
AMYLASE FLD-CCNC: 16 U/L
APPEARANCE FLD: ABNORMAL
BLASTS NFR FLD: 0 %
BODY FLD TYPE: ABNORMAL
CLOT CHECK: ABNORMAL
COLOR FLD: YELLOW
EOSINOPHIL NFR FLD: 1 %
LYMPHOCYTES NFR FLD: 83 %
MESOTHELIAL CELLS BODY FLUID: 3 %
MONOCYTES NFR FLD: 16 %
NEUTROPHILS NFR FLD: 0 %
RBC # FLD: <2000 CELLS/UL
SPECIMEN TYPE: NORMAL
SPECIMEN TYPE: NORMAL
UNIDENT CELLS NFR FLD: 0 %
WBC # FLD: 66 CELLS/UL

## 2025-08-14 PROCEDURE — 87205 SMEAR GRAM STAIN: CPT

## 2025-08-14 PROCEDURE — 82150 ASSAY OF AMYLASE: CPT

## 2025-08-14 PROCEDURE — 2709999900 IR US GUIDED PARACENTESIS

## 2025-08-14 PROCEDURE — 6360000002 HC RX W HCPCS: Performed by: RADIOLOGY

## 2025-08-14 PROCEDURE — P9047 ALBUMIN (HUMAN), 25%, 50ML: HCPCS | Performed by: RADIOLOGY

## 2025-08-14 PROCEDURE — 87070 CULTURE OTHR SPECIMN AEROBIC: CPT

## 2025-08-14 PROCEDURE — 82042 OTHER SOURCE ALBUMIN QUAN EA: CPT

## 2025-08-14 PROCEDURE — 49083 ABD PARACENTESIS W/IMAGING: CPT

## 2025-08-14 PROCEDURE — 87075 CULTR BACTERIA EXCEPT BLOOD: CPT

## 2025-08-14 PROCEDURE — 89051 BODY FLUID CELL COUNT: CPT

## 2025-08-14 RX ORDER — ALBUMIN (HUMAN) 12.5 G/50ML
SOLUTION INTRAVENOUS CONTINUOUS PRN
Status: COMPLETED | OUTPATIENT
Start: 2025-08-14 | End: 2025-08-14

## 2025-08-14 RX ORDER — LIDOCAINE HYDROCHLORIDE 10 MG/ML
INJECTION, SOLUTION EPIDURAL; INFILTRATION; INTRACAUDAL; PERINEURAL PRN
Status: COMPLETED | OUTPATIENT
Start: 2025-08-14 | End: 2025-08-14

## 2025-08-14 RX ADMIN — ALBUMIN (HUMAN) 50 G: 0.25 INJECTION, SOLUTION INTRAVENOUS at 11:14

## 2025-08-14 RX ADMIN — LIDOCAINE HYDROCHLORIDE 15 ML: 10 INJECTION, SOLUTION EPIDURAL; INFILTRATION; INTRACAUDAL; PERINEURAL at 10:51

## 2025-08-14 ASSESSMENT — PAIN - FUNCTIONAL ASSESSMENT: PAIN_FUNCTIONAL_ASSESSMENT: 0-10

## 2025-08-15 LAB — NON-GYN CYTOLOGY REPORT: NORMAL

## 2025-08-18 ENCOUNTER — RESULTS FOLLOW-UP (OUTPATIENT)
Dept: GASTROENTEROLOGY | Age: 80
End: 2025-08-18

## 2025-08-18 LAB — NON-GYN CYTOLOGY REPORT: NORMAL

## 2025-08-21 ENCOUNTER — HOSPITAL ENCOUNTER (OUTPATIENT)
Dept: INTERVENTIONAL RADIOLOGY/VASCULAR | Age: 80
Discharge: HOME | End: 2025-08-21
Payer: COMMERCIAL

## 2025-08-21 VITALS
TEMPERATURE: 97.7 F | HEART RATE: 70 BPM | RESPIRATION RATE: 16 BRPM | OXYGEN SATURATION: 95 % | DIASTOLIC BLOOD PRESSURE: 70 MMHG | SYSTOLIC BLOOD PRESSURE: 135 MMHG

## 2025-08-21 DIAGNOSIS — G31.89 PROGRESSIVE NEURONAL DEGENERATION WITH LIVER CIRRHOSIS (HCC): ICD-10-CM

## 2025-08-21 DIAGNOSIS — K74.60 PROGRESSIVE NEURONAL DEGENERATION WITH LIVER CIRRHOSIS (HCC): ICD-10-CM

## 2025-08-21 LAB
ALBUMIN FLD-MCNC: 0.6 G/DL
AMYLASE FLD-CCNC: 14 U/L
APPEARANCE FLD: NORMAL
BLASTS NFR FLD: 0 %
BODY FLD TYPE: NORMAL
CLOT CHECK: NORMAL
COLOR FLD: YELLOW
EOSINOPHIL NFR FLD: 0 %
LYMPHOCYTES NFR FLD: 23 %
MESOTHELIAL CELLS BODY FLUID: 5 %
MONOCYTES NFR FLD: 75 %
NEUTROPHILS NFR FLD: 2 %
RBC # FLD: <2000 CELLS/UL
SPECIMEN TYPE: NORMAL
SPECIMEN TYPE: NORMAL
UNIDENT CELLS NFR FLD: 0 %
WBC # FLD: 100 CELLS/UL

## 2025-08-21 PROCEDURE — P9047 ALBUMIN (HUMAN), 25%, 50ML: HCPCS

## 2025-08-21 PROCEDURE — 49083 ABD PARACENTESIS W/IMAGING: CPT

## 2025-08-21 PROCEDURE — 82042 OTHER SOURCE ALBUMIN QUAN EA: CPT

## 2025-08-21 PROCEDURE — 87205 SMEAR GRAM STAIN: CPT

## 2025-08-21 PROCEDURE — 2709999900 IR US GUIDED PARACENTESIS

## 2025-08-21 PROCEDURE — 6360000002 HC RX W HCPCS: Performed by: INTERNAL MEDICINE

## 2025-08-21 PROCEDURE — 89051 BODY FLUID CELL COUNT: CPT

## 2025-08-21 PROCEDURE — 87070 CULTURE OTHR SPECIMN AEROBIC: CPT

## 2025-08-21 PROCEDURE — P9047 ALBUMIN (HUMAN), 25%, 50ML: HCPCS | Performed by: INTERNAL MEDICINE

## 2025-08-21 PROCEDURE — 87075 CULTR BACTERIA EXCEPT BLOOD: CPT

## 2025-08-21 PROCEDURE — 6360000002 HC RX W HCPCS

## 2025-08-21 PROCEDURE — 6360000002 HC RX W HCPCS: Performed by: RADIOLOGY

## 2025-08-21 PROCEDURE — 82150 ASSAY OF AMYLASE: CPT

## 2025-08-21 RX ORDER — ALBUMIN (HUMAN) 12.5 G/50ML
SOLUTION INTRAVENOUS CONTINUOUS PRN
Status: COMPLETED | OUTPATIENT
Start: 2025-08-21 | End: 2025-08-21

## 2025-08-21 RX ORDER — LIDOCAINE HYDROCHLORIDE 10 MG/ML
INJECTION, SOLUTION EPIDURAL; INFILTRATION; INTRACAUDAL; PERINEURAL PRN
Status: COMPLETED | OUTPATIENT
Start: 2025-08-21 | End: 2025-08-21

## 2025-08-21 RX ADMIN — LIDOCAINE HYDROCHLORIDE 20 ML: 10 INJECTION, SOLUTION EPIDURAL; INFILTRATION; INTRACAUDAL; PERINEURAL at 14:00

## 2025-08-21 RX ADMIN — ALBUMIN (HUMAN) 50 G: 0.25 INJECTION, SOLUTION INTRAVENOUS at 14:04

## 2025-08-21 ASSESSMENT — PAIN SCALES - GENERAL
PAINLEVEL_OUTOF10: 0
PAINLEVEL_OUTOF10: 0

## 2025-08-26 LAB — NON-GYN CYTOLOGY REPORT: NORMAL

## 2025-08-28 ENCOUNTER — HOSPITAL ENCOUNTER (OUTPATIENT)
Dept: INTERVENTIONAL RADIOLOGY/VASCULAR | Age: 80
Discharge: HOME | End: 2025-08-28
Payer: COMMERCIAL

## 2025-08-28 VITALS
OXYGEN SATURATION: 98 % | RESPIRATION RATE: 16 BRPM | DIASTOLIC BLOOD PRESSURE: 72 MMHG | SYSTOLIC BLOOD PRESSURE: 148 MMHG | HEART RATE: 80 BPM

## 2025-08-28 DIAGNOSIS — R18.8 OTHER ASCITES: ICD-10-CM

## 2025-08-28 LAB
ALBUMIN FLD-MCNC: 0.8 G/DL
APPEARANCE FLD: ABNORMAL
BLASTS NFR FLD: 2 %
BODY FLD TYPE: ABNORMAL
CLOT CHECK: ABNORMAL
COLOR FLD: YELLOW
EOSINOPHIL NFR FLD: 0 %
LYMPHOCYTES NFR FLD: 28 %
MESOTHELIAL CELLS BODY FLUID: 40 %
MONOCYTES NFR FLD: 57 %
NEUTROPHILS NFR FLD: 13 %
PROT FLD-MCNC: 1.6 G/DL
RBC # FLD: <2000 CELLS/UL
SPECIMEN TYPE: NORMAL
SPECIMEN TYPE: NORMAL
UNIDENT CELLS NFR FLD: 0 %
WBC # FLD: 124 CELLS/UL

## 2025-08-28 PROCEDURE — 89051 BODY FLUID CELL COUNT: CPT

## 2025-08-28 PROCEDURE — 87075 CULTR BACTERIA EXCEPT BLOOD: CPT

## 2025-08-28 PROCEDURE — 82042 OTHER SOURCE ALBUMIN QUAN EA: CPT

## 2025-08-28 PROCEDURE — 87070 CULTURE OTHR SPECIMN AEROBIC: CPT

## 2025-08-28 PROCEDURE — 84157 ASSAY OF PROTEIN OTHER: CPT

## 2025-08-28 PROCEDURE — 87205 SMEAR GRAM STAIN: CPT

## 2025-08-28 PROCEDURE — 2709999900 IR US GUIDED PARACENTESIS

## 2025-08-28 PROCEDURE — 49083 ABD PARACENTESIS W/IMAGING: CPT

## 2025-08-28 PROCEDURE — P9047 ALBUMIN (HUMAN), 25%, 50ML: HCPCS | Performed by: RADIOLOGY

## 2025-08-28 PROCEDURE — 6360000002 HC RX W HCPCS: Performed by: RADIOLOGY

## 2025-08-28 RX ORDER — LIDOCAINE HYDROCHLORIDE 10 MG/ML
INJECTION, SOLUTION EPIDURAL; INFILTRATION; INTRACAUDAL; PERINEURAL PRN
Status: COMPLETED | OUTPATIENT
Start: 2025-08-28 | End: 2025-08-28

## 2025-08-28 RX ORDER — ALBUMIN (HUMAN) 12.5 G/50ML
SOLUTION INTRAVENOUS CONTINUOUS PRN
Status: COMPLETED | OUTPATIENT
Start: 2025-08-28 | End: 2025-08-28

## 2025-08-28 RX ADMIN — LIDOCAINE HYDROCHLORIDE 10 ML: 10 INJECTION, SOLUTION EPIDURAL; INFILTRATION; INTRACAUDAL; PERINEURAL at 11:03

## 2025-08-28 RX ADMIN — ALBUMIN (HUMAN) 37.5 G: 0.25 INJECTION, SOLUTION INTRAVENOUS at 11:30

## 2025-09-03 ENCOUNTER — APPOINTMENT (OUTPATIENT)
Dept: CT IMAGING | Age: 80
End: 2025-09-03
Payer: MEDICARE

## 2025-09-03 ENCOUNTER — HOSPITAL ENCOUNTER (INPATIENT)
Age: 80
LOS: 3 days | Discharge: HOME | End: 2025-09-06
Attending: STUDENT IN AN ORGANIZED HEALTH CARE EDUCATION/TRAINING PROGRAM | Admitting: STUDENT IN AN ORGANIZED HEALTH CARE EDUCATION/TRAINING PROGRAM
Payer: MEDICARE

## 2025-09-03 ENCOUNTER — APPOINTMENT (OUTPATIENT)
Dept: GENERAL RADIOLOGY | Age: 80
End: 2025-09-03
Payer: MEDICARE

## 2025-09-03 DIAGNOSIS — S09.90XA INJURY OF HEAD, INITIAL ENCOUNTER: ICD-10-CM

## 2025-09-03 DIAGNOSIS — K70.31 ALCOHOLIC CIRRHOSIS OF LIVER WITH ASCITES (HCC): ICD-10-CM

## 2025-09-03 DIAGNOSIS — G93.41 METABOLIC ENCEPHALOPATHY: Primary | ICD-10-CM

## 2025-09-03 DIAGNOSIS — E83.42 HYPOMAGNESEMIA: ICD-10-CM

## 2025-09-03 LAB
ABO + RH BLD: NORMAL
ALBUMIN SERPL-MCNC: 3.4 G/DL (ref 3.4–5)
ALBUMIN/GLOB SERPL: 1 {RATIO} (ref 1.1–2.2)
ALP SERPL-CCNC: 175 U/L (ref 40–129)
ALT SERPL-CCNC: 26 U/L (ref 10–40)
AMMONIA PLAS-SCNC: 93 UMOL/L (ref 16–60)
ANION GAP SERPL CALCULATED.3IONS-SCNC: 14 MMOL/L (ref 9–17)
AST SERPL-CCNC: 47 U/L (ref 15–37)
BASOPHILS # BLD: 0.09 K/UL
BASOPHILS NFR BLD: 2 % (ref 0–1)
BILIRUB SERPL-MCNC: 0.5 MG/DL (ref 0–1)
BILIRUB UR QL STRIP: NEGATIVE
BLOOD BANK SAMPLE EXPIRATION: NORMAL
BLOOD GROUP ANTIBODIES SERPL: NEGATIVE
BNP SERPL-MCNC: 145 PG/ML (ref 0–450)
BUN SERPL-MCNC: 23 MG/DL (ref 7–20)
CALCIUM SERPL-MCNC: 9.9 MG/DL (ref 8.3–10.6)
CHLORIDE SERPL-SCNC: 105 MMOL/L (ref 99–110)
CHP ED QC CHECK: YES
CLARITY UR: CLEAR
CO2 SERPL-SCNC: 21 MMOL/L (ref 21–32)
COLOR UR: YELLOW
COMMENT: ABNORMAL
CREAT SERPL-MCNC: 1.7 MG/DL (ref 0.8–1.3)
EOSINOPHIL # BLD: 0.66 K/UL
EOSINOPHILS RELATIVE PERCENT: 11 % (ref 0–3)
ERYTHROCYTE [DISTWIDTH] IN BLOOD BY AUTOMATED COUNT: 21.4 % (ref 11.7–14.9)
ETHANOLAMINE SERPL-MCNC: 54 MG/DL (ref 0–0.08)
GFR, ESTIMATED: 39 ML/MIN/1.73M2
GLUCOSE BLD-MCNC: 140 MG/DL
GLUCOSE BLD-MCNC: 140 MG/DL (ref 74–99)
GLUCOSE SERPL-MCNC: 132 MG/DL (ref 74–99)
GLUCOSE UR STRIP-MCNC: NEGATIVE MG/DL
HCT VFR BLD AUTO: 29.6 % (ref 42–52)
HGB BLD-MCNC: 9 G/DL (ref 13.5–18)
HGB UR QL STRIP.AUTO: NEGATIVE
IMM GRANULOCYTES # BLD AUTO: 0.01 K/UL
IMM GRANULOCYTES NFR BLD: 0 %
INR PPP: 1.1
KETONES UR STRIP-MCNC: ABNORMAL MG/DL
LEUKOCYTE ESTERASE UR QL STRIP: NEGATIVE
LYMPHOCYTES NFR BLD: 0.39 K/UL
LYMPHOCYTES RELATIVE PERCENT: 7 % (ref 24–44)
MAGNESIUM SERPL-MCNC: 1.6 MG/DL (ref 1.8–2.4)
MCH RBC QN AUTO: 26.2 PG (ref 27–31)
MCHC RBC AUTO-ENTMCNC: 30.4 G/DL (ref 32–36)
MCV RBC AUTO: 86 FL (ref 78–100)
MONOCYTES NFR BLD: 0.85 K/UL
MONOCYTES NFR BLD: 15 % (ref 0–5)
NEUTROPHILS NFR BLD: 65 % (ref 36–66)
NEUTS SEG NFR BLD: 3.79 K/UL
NITRITE UR QL STRIP: NEGATIVE
PARTIAL THROMBOPLASTIN TIME: 31.7 SEC (ref 25.1–37.1)
PH UR STRIP: 6 [PH] (ref 5–8)
PLATELET # BLD AUTO: 185 K/UL (ref 140–440)
PMV BLD AUTO: 10.3 FL (ref 7.5–11.1)
POTASSIUM SERPL-SCNC: 4.1 MMOL/L (ref 3.5–5.1)
PROT SERPL-MCNC: 7 G/DL (ref 6.4–8.2)
PROT UR STRIP-MCNC: NEGATIVE MG/DL
PROTHROMBIN TIME: 15.3 SEC (ref 11.7–14.5)
RBC # BLD AUTO: 3.44 M/UL (ref 4.6–6.2)
SODIUM SERPL-SCNC: 139 MMOL/L (ref 136–145)
SP GR UR STRIP: 1.02 (ref 1–1.03)
TROPONIN I SERPL HS-MCNC: 38 NG/L (ref 0–22)
TROPONIN I SERPL HS-MCNC: 42 NG/L (ref 0–22)
TSH SERPL DL<=0.05 MIU/L-ACNC: 2.31 UIU/ML (ref 0.27–4.2)
UROBILINOGEN UR STRIP-ACNC: 1 EU/DL (ref 0–1)
WBC OTHER # BLD: 5.8 K/UL (ref 4–10.5)

## 2025-09-03 PROCEDURE — 94761 N-INVAS EAR/PLS OXIMETRY MLT: CPT

## 2025-09-03 PROCEDURE — 82962 GLUCOSE BLOOD TEST: CPT

## 2025-09-03 PROCEDURE — 1200000000 HC SEMI PRIVATE

## 2025-09-03 PROCEDURE — 86900 BLOOD TYPING SEROLOGIC ABO: CPT

## 2025-09-03 PROCEDURE — 99285 EMERGENCY DEPT VISIT HI MDM: CPT

## 2025-09-03 PROCEDURE — 83880 ASSAY OF NATRIURETIC PEPTIDE: CPT

## 2025-09-03 PROCEDURE — 96374 THER/PROPH/DIAG INJ IV PUSH: CPT

## 2025-09-03 PROCEDURE — 6370000000 HC RX 637 (ALT 250 FOR IP): Performed by: STUDENT IN AN ORGANIZED HEALTH CARE EDUCATION/TRAINING PROGRAM

## 2025-09-03 PROCEDURE — 70450 CT HEAD/BRAIN W/O DYE: CPT

## 2025-09-03 PROCEDURE — 83735 ASSAY OF MAGNESIUM: CPT

## 2025-09-03 PROCEDURE — 6360000004 HC RX CONTRAST MEDICATION

## 2025-09-03 PROCEDURE — G0480 DRUG TEST DEF 1-7 CLASSES: HCPCS

## 2025-09-03 PROCEDURE — 85025 COMPLETE CBC W/AUTO DIFF WBC: CPT

## 2025-09-03 PROCEDURE — 85610 PROTHROMBIN TIME: CPT

## 2025-09-03 PROCEDURE — 80053 COMPREHEN METABOLIC PANEL: CPT

## 2025-09-03 PROCEDURE — 85730 THROMBOPLASTIN TIME PARTIAL: CPT

## 2025-09-03 PROCEDURE — 84484 ASSAY OF TROPONIN QUANT: CPT

## 2025-09-03 PROCEDURE — 74177 CT ABD & PELVIS W/CONTRAST: CPT

## 2025-09-03 PROCEDURE — 6360000002 HC RX W HCPCS

## 2025-09-03 PROCEDURE — 84443 ASSAY THYROID STIM HORMONE: CPT

## 2025-09-03 PROCEDURE — 2500000003 HC RX 250 WO HCPCS: Performed by: STUDENT IN AN ORGANIZED HEALTH CARE EDUCATION/TRAINING PROGRAM

## 2025-09-03 PROCEDURE — 86850 RBC ANTIBODY SCREEN: CPT

## 2025-09-03 PROCEDURE — 93005 ELECTROCARDIOGRAM TRACING: CPT

## 2025-09-03 PROCEDURE — 72125 CT NECK SPINE W/O DYE: CPT

## 2025-09-03 PROCEDURE — 6360000002 HC RX W HCPCS: Performed by: STUDENT IN AN ORGANIZED HEALTH CARE EDUCATION/TRAINING PROGRAM

## 2025-09-03 PROCEDURE — 81003 URINALYSIS AUTO W/O SCOPE: CPT

## 2025-09-03 PROCEDURE — 86901 BLOOD TYPING SEROLOGIC RH(D): CPT

## 2025-09-03 PROCEDURE — 82140 ASSAY OF AMMONIA: CPT

## 2025-09-03 PROCEDURE — 71045 X-RAY EXAM CHEST 1 VIEW: CPT

## 2025-09-03 RX ORDER — POTASSIUM CHLORIDE 1500 MG/1
40 TABLET, EXTENDED RELEASE ORAL PRN
Status: DISCONTINUED | OUTPATIENT
Start: 2025-09-03 | End: 2025-09-06 | Stop reason: HOSPADM

## 2025-09-03 RX ORDER — SODIUM CHLORIDE 0.9 % (FLUSH) 0.9 %
5-40 SYRINGE (ML) INJECTION EVERY 12 HOURS SCHEDULED
Status: DISCONTINUED | OUTPATIENT
Start: 2025-09-03 | End: 2025-09-06 | Stop reason: HOSPADM

## 2025-09-03 RX ORDER — LANOLIN ALCOHOL/MO/W.PET/CERES
200 CREAM (GRAM) TOPICAL DAILY
Status: DISCONTINUED | OUTPATIENT
Start: 2025-09-03 | End: 2025-09-06 | Stop reason: HOSPADM

## 2025-09-03 RX ORDER — DULOXETIN HYDROCHLORIDE 30 MG/1
60 CAPSULE, DELAYED RELEASE ORAL DAILY
Status: DISCONTINUED | OUTPATIENT
Start: 2025-09-03 | End: 2025-09-06 | Stop reason: HOSPADM

## 2025-09-03 RX ORDER — MAGNESIUM SULFATE IN WATER 40 MG/ML
2000 INJECTION, SOLUTION INTRAVENOUS PRN
Status: DISCONTINUED | OUTPATIENT
Start: 2025-09-03 | End: 2025-09-06 | Stop reason: HOSPADM

## 2025-09-03 RX ORDER — CARVEDILOL 6.25 MG/1
12.5 TABLET ORAL 2 TIMES DAILY WITH MEALS
Status: DISCONTINUED | OUTPATIENT
Start: 2025-09-03 | End: 2025-09-06 | Stop reason: HOSPADM

## 2025-09-03 RX ORDER — ALBUTEROL SULFATE 90 UG/1
1 INHALANT RESPIRATORY (INHALATION) EVERY 4 HOURS PRN
Status: DISCONTINUED | OUTPATIENT
Start: 2025-09-03 | End: 2025-09-06 | Stop reason: HOSPADM

## 2025-09-03 RX ORDER — ENOXAPARIN SODIUM 100 MG/ML
30 INJECTION SUBCUTANEOUS 2 TIMES DAILY
Status: DISCONTINUED | OUTPATIENT
Start: 2025-09-03 | End: 2025-09-06 | Stop reason: HOSPADM

## 2025-09-03 RX ORDER — MAGNESIUM SULFATE IN WATER 40 MG/ML
2000 INJECTION, SOLUTION INTRAVENOUS ONCE
Status: COMPLETED | OUTPATIENT
Start: 2025-09-03 | End: 2025-09-03

## 2025-09-03 RX ORDER — LACTULOSE 10 G/15ML
10 SOLUTION ORAL 3 TIMES DAILY
Status: DISCONTINUED | OUTPATIENT
Start: 2025-09-03 | End: 2025-09-04

## 2025-09-03 RX ORDER — LEVOTHYROXINE SODIUM 50 UG/1
50 TABLET ORAL DAILY
Status: DISCONTINUED | OUTPATIENT
Start: 2025-09-03 | End: 2025-09-06 | Stop reason: HOSPADM

## 2025-09-03 RX ORDER — SODIUM CHLORIDE 0.9 % (FLUSH) 0.9 %
5-40 SYRINGE (ML) INJECTION PRN
Status: DISCONTINUED | OUTPATIENT
Start: 2025-09-03 | End: 2025-09-06 | Stop reason: HOSPADM

## 2025-09-03 RX ORDER — PANTOPRAZOLE SODIUM 40 MG/1
40 TABLET, DELAYED RELEASE ORAL
Status: DISCONTINUED | OUTPATIENT
Start: 2025-09-04 | End: 2025-09-06 | Stop reason: HOSPADM

## 2025-09-03 RX ORDER — ONDANSETRON 4 MG/1
4 TABLET, ORALLY DISINTEGRATING ORAL EVERY 8 HOURS PRN
Status: DISCONTINUED | OUTPATIENT
Start: 2025-09-03 | End: 2025-09-06 | Stop reason: HOSPADM

## 2025-09-03 RX ORDER — ACETAMINOPHEN 325 MG/1
325 TABLET ORAL EVERY 6 HOURS PRN
Status: DISCONTINUED | OUTPATIENT
Start: 2025-09-03 | End: 2025-09-06 | Stop reason: HOSPADM

## 2025-09-03 RX ORDER — POTASSIUM CHLORIDE 7.45 MG/ML
10 INJECTION INTRAVENOUS PRN
Status: DISCONTINUED | OUTPATIENT
Start: 2025-09-03 | End: 2025-09-06 | Stop reason: HOSPADM

## 2025-09-03 RX ORDER — ACETAMINOPHEN 650 MG/1
325 SUPPOSITORY RECTAL EVERY 6 HOURS PRN
Status: DISCONTINUED | OUTPATIENT
Start: 2025-09-03 | End: 2025-09-06 | Stop reason: HOSPADM

## 2025-09-03 RX ORDER — IOPAMIDOL 755 MG/ML
75 INJECTION, SOLUTION INTRAVASCULAR
Status: COMPLETED | OUTPATIENT
Start: 2025-09-03 | End: 2025-09-03

## 2025-09-03 RX ORDER — POLYETHYLENE GLYCOL 3350 17 G/17G
17 POWDER, FOR SOLUTION ORAL DAILY PRN
Status: DISCONTINUED | OUTPATIENT
Start: 2025-09-03 | End: 2025-09-06 | Stop reason: HOSPADM

## 2025-09-03 RX ORDER — FUROSEMIDE 40 MG/1
40 TABLET ORAL DAILY
Status: DISCONTINUED | OUTPATIENT
Start: 2025-09-03 | End: 2025-09-06 | Stop reason: HOSPADM

## 2025-09-03 RX ORDER — ONDANSETRON 2 MG/ML
4 INJECTION INTRAMUSCULAR; INTRAVENOUS EVERY 6 HOURS PRN
Status: DISCONTINUED | OUTPATIENT
Start: 2025-09-03 | End: 2025-09-06 | Stop reason: HOSPADM

## 2025-09-03 RX ORDER — SODIUM CHLORIDE 9 MG/ML
INJECTION, SOLUTION INTRAVENOUS PRN
Status: DISCONTINUED | OUTPATIENT
Start: 2025-09-03 | End: 2025-09-06 | Stop reason: HOSPADM

## 2025-09-03 RX ORDER — SPIRONOLACTONE 50 MG/1
100 TABLET, FILM COATED ORAL 2 TIMES DAILY
Status: DISCONTINUED | OUTPATIENT
Start: 2025-09-03 | End: 2025-09-06 | Stop reason: HOSPADM

## 2025-09-03 RX ADMIN — LACTULOSE 10 G: 20 SOLUTION ORAL at 16:59

## 2025-09-03 RX ADMIN — LEVOTHYROXINE SODIUM 50 MCG: 0.05 TABLET ORAL at 16:59

## 2025-09-03 RX ADMIN — LACTULOSE 10 G: 20 SOLUTION ORAL at 20:40

## 2025-09-03 RX ADMIN — IOPAMIDOL 75 ML: 755 INJECTION, SOLUTION INTRAVENOUS at 11:03

## 2025-09-03 RX ADMIN — SODIUM CHLORIDE, PRESERVATIVE FREE 10 ML: 5 INJECTION INTRAVENOUS at 20:40

## 2025-09-03 RX ADMIN — FUROSEMIDE 40 MG: 40 TABLET ORAL at 16:59

## 2025-09-03 RX ADMIN — MAGNESIUM SULFATE HEPTAHYDRATE 2000 MG: 40 INJECTION, SOLUTION INTRAVENOUS at 13:39

## 2025-09-03 RX ADMIN — CARVEDILOL 12.5 MG: 6.25 TABLET, FILM COATED ORAL at 16:59

## 2025-09-03 RX ADMIN — DULOXETINE 60 MG: 30 CAPSULE, DELAYED RELEASE ORAL at 16:59

## 2025-09-03 RX ADMIN — ENOXAPARIN SODIUM 30 MG: 100 INJECTION SUBCUTANEOUS at 20:40

## 2025-09-03 RX ADMIN — SPIRONOLACTONE 100 MG: 50 TABLET ORAL at 20:40

## 2025-09-03 RX ADMIN — RIFAXIMIN 400 MG: 200 TABLET ORAL at 17:08

## 2025-09-03 RX ADMIN — Medication 200 MG: at 17:08

## 2025-09-03 RX ADMIN — RIFAXIMIN 400 MG: 200 TABLET ORAL at 20:40

## 2025-09-03 ASSESSMENT — PAIN SCALES - GENERAL
PAINLEVEL_OUTOF10: 0
PAINLEVEL_OUTOF10: 0

## 2025-09-03 ASSESSMENT — PAIN - FUNCTIONAL ASSESSMENT: PAIN_FUNCTIONAL_ASSESSMENT: 0-10

## 2025-09-04 ENCOUNTER — APPOINTMENT (OUTPATIENT)
Dept: INTERVENTIONAL RADIOLOGY/VASCULAR | Age: 80
End: 2025-09-04
Payer: MEDICARE

## 2025-09-04 PROBLEM — G93.41 METABOLIC ENCEPHALOPATHY: Status: ACTIVE | Noted: 2025-09-04

## 2025-09-04 LAB
ALBUMIN SERPL-MCNC: 2.9 G/DL (ref 3.4–5)
ALBUMIN/GLOB SERPL: 0.8 {RATIO} (ref 1.1–2.2)
ALP SERPL-CCNC: 144 U/L (ref 40–129)
ALT SERPL-CCNC: 20 U/L (ref 10–40)
AMYLASE FLD-CCNC: 14 U/L
ANION GAP SERPL CALCULATED.3IONS-SCNC: 11 MMOL/L (ref 9–17)
APPEARANCE FLD: NORMAL
AST SERPL-CCNC: 47 U/L (ref 15–37)
BASOPHILS # BLD: 0.06 K/UL
BASOPHILS NFR BLD: 1 % (ref 0–1)
BILIRUB SERPL-MCNC: 0.9 MG/DL (ref 0–1)
BODY FLD TYPE: NORMAL
BUN SERPL-MCNC: 21 MG/DL (ref 7–20)
CALCIUM SERPL-MCNC: 9.7 MG/DL (ref 8.3–10.6)
CHLORIDE SERPL-SCNC: 104 MMOL/L (ref 99–110)
CLOT CHECK: NORMAL
CO2 SERPL-SCNC: 23 MMOL/L (ref 21–32)
COLOR FLD: YELLOW
CREAT SERPL-MCNC: 1.3 MG/DL (ref 0.8–1.3)
EKG ATRIAL RATE: 96 BPM
EKG DIAGNOSIS: NORMAL
EKG P AXIS: 30 DEGREES
EKG P-R INTERVAL: 154 MS
EKG Q-T INTERVAL: 364 MS
EKG QRS DURATION: 76 MS
EKG QTC CALCULATION (BAZETT): 459 MS
EKG R AXIS: 32 DEGREES
EKG T AXIS: 21 DEGREES
EKG VENTRICULAR RATE: 96 BPM
EOSINOPHIL # BLD: 0.74 K/UL
EOSINOPHILS RELATIVE PERCENT: 14 % (ref 0–3)
ERYTHROCYTE [DISTWIDTH] IN BLOOD BY AUTOMATED COUNT: 21.6 % (ref 11.7–14.9)
GFR, ESTIMATED: 54 ML/MIN/1.73M2
GLUCOSE BLD-MCNC: 137 MG/DL (ref 74–99)
GLUCOSE FLD-MCNC: 131 MG/DL
GLUCOSE SERPL-MCNC: 101 MG/DL (ref 74–99)
HCT VFR BLD AUTO: 30.7 % (ref 42–52)
HGB BLD-MCNC: 9.1 G/DL (ref 13.5–18)
IMM GRANULOCYTES # BLD AUTO: 0.01 K/UL
IMM GRANULOCYTES NFR BLD: 0 %
LYMPHOCYTES NFR BLD: 0.39 K/UL
LYMPHOCYTES RELATIVE PERCENT: 8 % (ref 24–44)
MCH RBC QN AUTO: 26.1 PG (ref 27–31)
MCHC RBC AUTO-ENTMCNC: 29.6 G/DL (ref 32–36)
MCV RBC AUTO: 88.2 FL (ref 78–100)
MONOCYTES NFR BLD: 0.7 K/UL
MONOCYTES NFR BLD: 13 % (ref 0–5)
MONOCYTES NFR FLD: 89 %
NEUTROPHILS NFR BLD: 64 % (ref 36–66)
NEUTROPHILS NFR FLD: 11 %
NEUTS SEG NFR BLD: 3.31 K/UL
PLATELET, FLUORESCENCE: 166 K/UL (ref 140–440)
PMV BLD AUTO: 10.1 FL (ref 7.5–11.1)
POTASSIUM SERPL-SCNC: 4 MMOL/L (ref 3.5–5.1)
PROT SERPL-MCNC: 6.7 G/DL (ref 6.4–8.2)
RBC # BLD AUTO: 3.48 M/UL (ref 4.6–6.2)
RBC # FLD: <2000 CELLS/UL
SODIUM SERPL-SCNC: 137 MMOL/L (ref 136–145)
SPECIMEN TYPE: NORMAL
SPECIMEN TYPE: NORMAL
WBC # FLD: 80 CELLS/UL
WBC OTHER # BLD: 5.2 K/UL (ref 4–10.5)

## 2025-09-04 PROCEDURE — 97530 THERAPEUTIC ACTIVITIES: CPT

## 2025-09-04 PROCEDURE — 87205 SMEAR GRAM STAIN: CPT

## 2025-09-04 PROCEDURE — 94761 N-INVAS EAR/PLS OXIMETRY MLT: CPT

## 2025-09-04 PROCEDURE — 82945 GLUCOSE OTHER FLUID: CPT

## 2025-09-04 PROCEDURE — 87075 CULTR BACTERIA EXCEPT BLOOD: CPT

## 2025-09-04 PROCEDURE — 89051 BODY FLUID CELL COUNT: CPT

## 2025-09-04 PROCEDURE — 80053 COMPREHEN METABOLIC PANEL: CPT

## 2025-09-04 PROCEDURE — 99223 1ST HOSP IP/OBS HIGH 75: CPT | Performed by: INTERNAL MEDICINE

## 2025-09-04 PROCEDURE — P9047 ALBUMIN (HUMAN), 25%, 50ML: HCPCS

## 2025-09-04 PROCEDURE — 82962 GLUCOSE BLOOD TEST: CPT

## 2025-09-04 PROCEDURE — 6360000002 HC RX W HCPCS

## 2025-09-04 PROCEDURE — 2709999900 IR US GUIDED PARACENTESIS

## 2025-09-04 PROCEDURE — 87070 CULTURE OTHR SPECIMN AEROBIC: CPT

## 2025-09-04 PROCEDURE — 49083 ABD PARACENTESIS W/IMAGING: CPT

## 2025-09-04 PROCEDURE — 97166 OT EVAL MOD COMPLEX 45 MIN: CPT

## 2025-09-04 PROCEDURE — 85025 COMPLETE CBC W/AUTO DIFF WBC: CPT

## 2025-09-04 PROCEDURE — 36415 COLL VENOUS BLD VENIPUNCTURE: CPT

## 2025-09-04 PROCEDURE — 93010 ELECTROCARDIOGRAM REPORT: CPT | Performed by: INTERNAL MEDICINE

## 2025-09-04 PROCEDURE — 2580000003 HC RX 258: Performed by: INTERNAL MEDICINE

## 2025-09-04 PROCEDURE — 2500000003 HC RX 250 WO HCPCS: Performed by: STUDENT IN AN ORGANIZED HEALTH CARE EDUCATION/TRAINING PROGRAM

## 2025-09-04 PROCEDURE — 6360000002 HC RX W HCPCS: Performed by: INTERNAL MEDICINE

## 2025-09-04 PROCEDURE — 82150 ASSAY OF AMYLASE: CPT

## 2025-09-04 PROCEDURE — 6360000002 HC RX W HCPCS: Performed by: RADIOLOGY

## 2025-09-04 PROCEDURE — 6370000000 HC RX 637 (ALT 250 FOR IP): Performed by: STUDENT IN AN ORGANIZED HEALTH CARE EDUCATION/TRAINING PROGRAM

## 2025-09-04 PROCEDURE — 1200000000 HC SEMI PRIVATE

## 2025-09-04 PROCEDURE — 6370000000 HC RX 637 (ALT 250 FOR IP): Performed by: INTERNAL MEDICINE

## 2025-09-04 RX ORDER — LIDOCAINE HYDROCHLORIDE 10 MG/ML
INJECTION, SOLUTION EPIDURAL; INFILTRATION; INTRACAUDAL; PERINEURAL PRN
Status: DISCONTINUED | OUTPATIENT
Start: 2025-09-04 | End: 2025-09-06 | Stop reason: HOSPADM

## 2025-09-04 RX ORDER — LACTULOSE 10 G/15ML
30 SOLUTION ORAL 3 TIMES DAILY
Status: DISCONTINUED | OUTPATIENT
Start: 2025-09-04 | End: 2025-09-06 | Stop reason: HOSPADM

## 2025-09-04 RX ADMIN — CEFTRIAXONE SODIUM 2000 MG: 2 INJECTION, POWDER, FOR SOLUTION INTRAMUSCULAR; INTRAVENOUS at 09:48

## 2025-09-04 RX ADMIN — LIDOCAINE HYDROCHLORIDE 10 ML: 10 INJECTION, SOLUTION EPIDURAL; INFILTRATION; INTRACAUDAL; PERINEURAL at 15:22

## 2025-09-04 RX ADMIN — FUROSEMIDE 40 MG: 40 TABLET ORAL at 08:43

## 2025-09-04 RX ADMIN — SPIRONOLACTONE 100 MG: 50 TABLET ORAL at 08:43

## 2025-09-04 RX ADMIN — SPIRONOLACTONE 100 MG: 50 TABLET ORAL at 21:11

## 2025-09-04 RX ADMIN — CARVEDILOL 12.5 MG: 6.25 TABLET, FILM COATED ORAL at 08:43

## 2025-09-04 RX ADMIN — LACTULOSE 10 G: 20 SOLUTION ORAL at 08:44

## 2025-09-04 RX ADMIN — SODIUM CHLORIDE, PRESERVATIVE FREE 10 ML: 5 INJECTION INTRAVENOUS at 08:44

## 2025-09-04 RX ADMIN — LEVOTHYROXINE SODIUM 50 MCG: 0.05 TABLET ORAL at 05:00

## 2025-09-04 RX ADMIN — SODIUM CHLORIDE, PRESERVATIVE FREE 10 ML: 5 INJECTION INTRAVENOUS at 21:12

## 2025-09-04 RX ADMIN — RIFAXIMIN 400 MG: 200 TABLET ORAL at 08:43

## 2025-09-04 RX ADMIN — LACTULOSE 30 G: 20 SOLUTION ORAL at 21:12

## 2025-09-04 RX ADMIN — RIFAXIMIN 400 MG: 200 TABLET ORAL at 21:12

## 2025-09-04 RX ADMIN — CARVEDILOL 12.5 MG: 6.25 TABLET, FILM COATED ORAL at 17:23

## 2025-09-04 RX ADMIN — LACTULOSE 10 G: 20 SOLUTION ORAL at 13:04

## 2025-09-04 RX ADMIN — PANTOPRAZOLE SODIUM 40 MG: 40 TABLET, DELAYED RELEASE ORAL at 05:00

## 2025-09-04 RX ADMIN — RIFAXIMIN 400 MG: 200 TABLET ORAL at 13:04

## 2025-09-04 RX ADMIN — Medication 200 MG: at 08:43

## 2025-09-04 ASSESSMENT — PAIN SCALES - GENERAL
PAINLEVEL_OUTOF10: 0
PAINLEVEL_OUTOF10: 0

## 2025-09-05 PROCEDURE — 97163 PT EVAL HIGH COMPLEX 45 MIN: CPT

## 2025-09-05 PROCEDURE — 2500000003 HC RX 250 WO HCPCS: Performed by: STUDENT IN AN ORGANIZED HEALTH CARE EDUCATION/TRAINING PROGRAM

## 2025-09-05 PROCEDURE — 1200000000 HC SEMI PRIVATE

## 2025-09-05 PROCEDURE — 97530 THERAPEUTIC ACTIVITIES: CPT

## 2025-09-05 PROCEDURE — 6370000000 HC RX 637 (ALT 250 FOR IP): Performed by: INTERNAL MEDICINE

## 2025-09-05 PROCEDURE — 2580000003 HC RX 258: Performed by: INTERNAL MEDICINE

## 2025-09-05 PROCEDURE — 6360000002 HC RX W HCPCS: Performed by: INTERNAL MEDICINE

## 2025-09-05 PROCEDURE — 97116 GAIT TRAINING THERAPY: CPT

## 2025-09-05 PROCEDURE — 6370000000 HC RX 637 (ALT 250 FOR IP): Performed by: STUDENT IN AN ORGANIZED HEALTH CARE EDUCATION/TRAINING PROGRAM

## 2025-09-05 RX ADMIN — LEVOTHYROXINE SODIUM 50 MCG: 0.05 TABLET ORAL at 05:12

## 2025-09-05 RX ADMIN — PANTOPRAZOLE SODIUM 40 MG: 40 TABLET, DELAYED RELEASE ORAL at 05:12

## 2025-09-05 RX ADMIN — SPIRONOLACTONE 100 MG: 50 TABLET ORAL at 10:06

## 2025-09-05 RX ADMIN — FUROSEMIDE 40 MG: 40 TABLET ORAL at 10:05

## 2025-09-05 RX ADMIN — CARVEDILOL 12.5 MG: 6.25 TABLET, FILM COATED ORAL at 10:06

## 2025-09-05 RX ADMIN — SODIUM CHLORIDE, PRESERVATIVE FREE 10 ML: 5 INJECTION INTRAVENOUS at 10:09

## 2025-09-05 RX ADMIN — RIFAXIMIN 400 MG: 200 TABLET ORAL at 15:05

## 2025-09-05 RX ADMIN — RIFAXIMIN 400 MG: 200 TABLET ORAL at 10:09

## 2025-09-05 RX ADMIN — CARVEDILOL 12.5 MG: 6.25 TABLET, FILM COATED ORAL at 17:38

## 2025-09-05 RX ADMIN — LACTULOSE 30 G: 20 SOLUTION ORAL at 10:05

## 2025-09-05 RX ADMIN — Medication 200 MG: at 10:06

## 2025-09-05 RX ADMIN — CEFTRIAXONE SODIUM 2000 MG: 2 INJECTION, POWDER, FOR SOLUTION INTRAMUSCULAR; INTRAVENOUS at 10:01

## 2025-09-05 ASSESSMENT — PAIN SCALES - GENERAL
PAINLEVEL_OUTOF10: 0

## 2025-09-06 VITALS
HEART RATE: 71 BPM | DIASTOLIC BLOOD PRESSURE: 57 MMHG | OXYGEN SATURATION: 98 % | TEMPERATURE: 98.4 F | HEIGHT: 68 IN | RESPIRATION RATE: 14 BRPM | SYSTOLIC BLOOD PRESSURE: 121 MMHG | WEIGHT: 217 LBS | BODY MASS INDEX: 32.89 KG/M2

## 2025-09-06 PROCEDURE — 2500000003 HC RX 250 WO HCPCS: Performed by: INTERNAL MEDICINE

## 2025-09-06 PROCEDURE — 6360000002 HC RX W HCPCS: Performed by: INTERNAL MEDICINE

## 2025-09-06 PROCEDURE — 2500000003 HC RX 250 WO HCPCS: Performed by: STUDENT IN AN ORGANIZED HEALTH CARE EDUCATION/TRAINING PROGRAM

## 2025-09-06 PROCEDURE — 6370000000 HC RX 637 (ALT 250 FOR IP): Performed by: INTERNAL MEDICINE

## 2025-09-06 PROCEDURE — 94761 N-INVAS EAR/PLS OXIMETRY MLT: CPT

## 2025-09-06 PROCEDURE — 6370000000 HC RX 637 (ALT 250 FOR IP): Performed by: STUDENT IN AN ORGANIZED HEALTH CARE EDUCATION/TRAINING PROGRAM

## 2025-09-06 RX ADMIN — LEVOTHYROXINE SODIUM 50 MCG: 0.05 TABLET ORAL at 06:59

## 2025-09-06 RX ADMIN — LACTULOSE 30 G: 20 SOLUTION ORAL at 09:08

## 2025-09-06 RX ADMIN — ENOXAPARIN SODIUM 30 MG: 100 INJECTION SUBCUTANEOUS at 09:09

## 2025-09-06 RX ADMIN — WATER 1000 MG: 1 INJECTION INTRAMUSCULAR; INTRAVENOUS; SUBCUTANEOUS at 09:10

## 2025-09-06 RX ADMIN — CARVEDILOL 12.5 MG: 6.25 TABLET, FILM COATED ORAL at 09:09

## 2025-09-06 RX ADMIN — SODIUM CHLORIDE, PRESERVATIVE FREE 10 ML: 5 INJECTION INTRAVENOUS at 09:10

## 2025-09-06 RX ADMIN — SPIRONOLACTONE 100 MG: 50 TABLET ORAL at 09:09

## 2025-09-06 RX ADMIN — RIFAXIMIN 400 MG: 200 TABLET ORAL at 09:12

## 2025-09-06 RX ADMIN — Medication 200 MG: at 09:09

## 2025-09-06 RX ADMIN — FUROSEMIDE 40 MG: 40 TABLET ORAL at 09:09

## 2025-09-06 RX ADMIN — PANTOPRAZOLE SODIUM 40 MG: 40 TABLET, DELAYED RELEASE ORAL at 06:59

## 2025-09-06 ASSESSMENT — PAIN SCALES - GENERAL: PAINLEVEL_OUTOF10: 0

## 2025-09-07 LAB
MICROORGANISM SPEC CULT: NORMAL
MICROORGANISM/AGENT SPEC: NORMAL
SERVICE CMNT-IMP: NORMAL
SPECIMEN DESCRIPTION: NORMAL

## (undated) DEVICE — ENDOSCOPIC KIT 1.1+ OP4 CA DE 2 GWN AAMI LEVEL 3

## (undated) DEVICE — FORCEPS BX L240CM JAW DIA2.8MM L CAP W/ NDL MIC MESH TOOTH

## (undated) DEVICE — ELECTRODE ES AD CRDLSS PT RET REM POLYHESIVE

## (undated) DEVICE — Z DISCONTINUED (USE MFG CAT MVABO)  TUBING GAS SAMPLING STD 6.5 FT FEMALE CONN SMRT CAPNOLINE

## (undated) DEVICE — ENDOSCOPY KIT: Brand: MEDLINE INDUSTRIES, INC.

## (undated) DEVICE — SNARE ENDOSCP CLD 230 CM 10 MM 2.3 MM ROTATABLE LESIONHUNTER

## (undated) DEVICE — FIAPC® PROBE W/ FILTER 2200 C OD 2.3MM/6.9FR; L 2.2M/7.2FT: Brand: ERBE